# Patient Record
Sex: FEMALE | Race: WHITE | Employment: OTHER | URBAN - METROPOLITAN AREA
[De-identification: names, ages, dates, MRNs, and addresses within clinical notes are randomized per-mention and may not be internally consistent; named-entity substitution may affect disease eponyms.]

---

## 2017-07-24 ENCOUNTER — INITIAL CONSULT (OUTPATIENT)
Dept: PHYSICAL MEDICINE AND REHAB | Age: 67
End: 2017-07-24
Payer: COMMERCIAL

## 2017-07-24 VITALS
HEART RATE: 93 BPM | SYSTOLIC BLOOD PRESSURE: 109 MMHG | HEIGHT: 60 IN | BODY MASS INDEX: 21.79 KG/M2 | TEMPERATURE: 97.6 F | WEIGHT: 111 LBS | DIASTOLIC BLOOD PRESSURE: 67 MMHG

## 2017-07-24 DIAGNOSIS — I63.9 CEREBROVASCULAR ACCIDENT (CVA), UNSPECIFIED MECHANISM (HCC): ICD-10-CM

## 2017-07-24 DIAGNOSIS — R26.89 FUNCTIONAL GAIT ABNORMALITY: Primary | ICD-10-CM

## 2017-07-24 PROCEDURE — 99203 OFFICE O/P NEW LOW 30 MIN: CPT | Performed by: PHYSICAL MEDICINE & REHABILITATION

## 2017-07-24 RX ORDER — WHEELCHAIR
EACH MISCELLANEOUS
Qty: 1 EACH | Refills: 0 | Status: SHIPPED | OUTPATIENT
Start: 2017-07-24

## 2018-07-10 ENCOUNTER — HOSPITAL ENCOUNTER (OUTPATIENT)
Age: 68
Setting detail: SPECIMEN
Discharge: HOME | End: 2018-07-10
Payer: COMMERCIAL

## 2018-07-10 LAB
ABSOLUTE EOS #: 0.21 K/UL (ref 0–0.44)
ABSOLUTE IMMATURE GRANULOCYTE: <0.03 K/UL (ref 0–0.3)
ABSOLUTE LYMPH #: 1.82 K/UL (ref 1.1–3.7)
ABSOLUTE MONO #: 0.5 K/UL (ref 0.1–1.2)
ANION GAP SERPL CALCULATED.3IONS-SCNC: 13 MMOL/L (ref 9–17)
BASOPHILS # BLD: 1 % (ref 0–2)
BASOPHILS ABSOLUTE: 0.04 K/UL (ref 0–0.2)
BUN BLDV-MCNC: 9 MG/DL (ref 8–23)
BUN/CREAT BLD: NORMAL (ref 9–20)
CALCIUM SERPL-MCNC: 8.8 MG/DL (ref 8.6–10.4)
CHLORIDE BLD-SCNC: 101 MMOL/L (ref 98–107)
CO2: 25 MMOL/L (ref 20–31)
CREAT SERPL-MCNC: 0.54 MG/DL (ref 0.5–0.9)
DIFFERENTIAL TYPE: NORMAL
EOSINOPHILS RELATIVE PERCENT: 3 % (ref 1–4)
GFR AFRICAN AMERICAN: >60 ML/MIN
GFR NON-AFRICAN AMERICAN: >60 ML/MIN
GFR SERPL CREATININE-BSD FRML MDRD: NORMAL ML/MIN/{1.73_M2}
GFR SERPL CREATININE-BSD FRML MDRD: NORMAL ML/MIN/{1.73_M2}
GLUCOSE BLD-MCNC: 82 MG/DL (ref 70–99)
HCT VFR BLD CALC: 40.3 % (ref 36.3–47.1)
HEMOGLOBIN: 13 G/DL (ref 11.9–15.1)
IMMATURE GRANULOCYTES: 0 %
LYMPHOCYTES # BLD: 27 % (ref 24–43)
MCH RBC QN AUTO: 30.1 PG (ref 25.2–33.5)
MCHC RBC AUTO-ENTMCNC: 32.3 G/DL (ref 28.4–34.8)
MCV RBC AUTO: 93.3 FL (ref 82.6–102.9)
MONOCYTES # BLD: 7 % (ref 3–12)
NRBC AUTOMATED: 0 PER 100 WBC
PDW BLD-RTO: 13 % (ref 11.8–14.4)
PLATELET # BLD: 226 K/UL (ref 138–453)
PLATELET ESTIMATE: NORMAL
PMV BLD AUTO: 11.8 FL (ref 8.1–13.5)
POTASSIUM SERPL-SCNC: 3.8 MMOL/L (ref 3.7–5.3)
RBC # BLD: 4.32 M/UL (ref 3.95–5.11)
RBC # BLD: NORMAL 10*6/UL
SEG NEUTROPHILS: 62 % (ref 36–65)
SEGMENTED NEUTROPHILS ABSOLUTE COUNT: 4.24 K/UL (ref 1.5–8.1)
SODIUM BLD-SCNC: 139 MMOL/L (ref 135–144)
WBC # BLD: 6.8 K/UL (ref 3.5–11.3)
WBC # BLD: NORMAL 10*3/UL

## 2019-01-01 ENCOUNTER — HOSPITAL ENCOUNTER (OUTPATIENT)
Age: 69
Setting detail: SPECIMEN
Discharge: HOME | End: 2019-12-19
Payer: COMMERCIAL

## 2019-01-01 LAB — TSH SERPL DL<=0.05 MIU/L-ACNC: 5.51 MIU/L (ref 0.3–5)

## 2019-01-01 PROCEDURE — 84443 ASSAY THYROID STIM HORMONE: CPT

## 2019-01-01 PROCEDURE — P9603 ONE-WAY ALLOW PRORATED MILES: HCPCS

## 2019-01-01 PROCEDURE — 36415 COLL VENOUS BLD VENIPUNCTURE: CPT

## 2019-03-27 ENCOUNTER — HOSPITAL ENCOUNTER (INPATIENT)
Age: 69
LOS: 1 days | Discharge: HOME | DRG: 556 | End: 2019-03-30
Attending: EMERGENCY MEDICINE | Admitting: EMERGENCY MEDICINE
Payer: COMMERCIAL

## 2019-03-27 ENCOUNTER — APPOINTMENT (OUTPATIENT)
Dept: GENERAL RADIOLOGY | Age: 69
DRG: 556 | End: 2019-03-27
Payer: COMMERCIAL

## 2019-03-27 ENCOUNTER — APPOINTMENT (OUTPATIENT)
Dept: CT IMAGING | Age: 69
DRG: 556 | End: 2019-03-27
Payer: COMMERCIAL

## 2019-03-27 DIAGNOSIS — M25.562 ACUTE PAIN OF LEFT KNEE: Primary | ICD-10-CM

## 2019-03-27 PROCEDURE — 99285 EMERGENCY DEPT VISIT HI MDM: CPT

## 2019-03-27 PROCEDURE — 6360000002 HC RX W HCPCS: Performed by: EMERGENCY MEDICINE

## 2019-03-27 PROCEDURE — 73590 X-RAY EXAM OF LOWER LEG: CPT

## 2019-03-27 PROCEDURE — G0378 HOSPITAL OBSERVATION PER HR: HCPCS

## 2019-03-27 PROCEDURE — 73552 X-RAY EXAM OF FEMUR 2/>: CPT

## 2019-03-27 PROCEDURE — 73560 X-RAY EXAM OF KNEE 1 OR 2: CPT

## 2019-03-27 PROCEDURE — 96372 THER/PROPH/DIAG INJ SC/IM: CPT

## 2019-03-27 PROCEDURE — 6360000002 HC RX W HCPCS

## 2019-03-27 RX ORDER — MORPHINE SULFATE 4 MG/ML
4 INJECTION, SOLUTION INTRAMUSCULAR; INTRAVENOUS ONCE
Status: COMPLETED | OUTPATIENT
Start: 2019-03-27 | End: 2019-03-27

## 2019-03-27 RX ORDER — KETOROLAC TROMETHAMINE 15 MG/ML
15 INJECTION, SOLUTION INTRAMUSCULAR; INTRAVENOUS ONCE
Status: COMPLETED | OUTPATIENT
Start: 2019-03-27 | End: 2019-03-27

## 2019-03-27 RX ORDER — TIZANIDINE 2 MG/1
2 TABLET ORAL EVERY 8 HOURS PRN
Status: ON HOLD | COMMUNITY
End: 2019-05-31 | Stop reason: HOSPADM

## 2019-03-27 RX ORDER — MORPHINE SULFATE 4 MG/ML
INJECTION, SOLUTION INTRAMUSCULAR; INTRAVENOUS
Status: COMPLETED
Start: 2019-03-27 | End: 2019-03-27

## 2019-03-27 RX ADMIN — KETOROLAC TROMETHAMINE 15 MG: 15 INJECTION, SOLUTION INTRAMUSCULAR; INTRAVENOUS at 19:00

## 2019-03-27 RX ADMIN — MORPHINE SULFATE 4 MG: 4 INJECTION INTRAVENOUS at 20:17

## 2019-03-27 RX ADMIN — MORPHINE SULFATE 4 MG: 4 INJECTION INTRAVENOUS at 19:13

## 2019-03-27 RX ADMIN — MORPHINE SULFATE 4 MG: 4 INJECTION, SOLUTION INTRAMUSCULAR; INTRAVENOUS at 19:13

## 2019-03-27 ASSESSMENT — PAIN SCALES - GENERAL
PAINLEVEL_OUTOF10: 5
PAINLEVEL_OUTOF10: 9
PAINLEVEL_OUTOF10: 10

## 2019-03-27 ASSESSMENT — PAIN DESCRIPTION - LOCATION
LOCATION: KNEE
LOCATION: KNEE

## 2019-03-27 ASSESSMENT — PAIN DESCRIPTION - PAIN TYPE
TYPE: ACUTE PAIN
TYPE: ACUTE PAIN

## 2019-03-27 ASSESSMENT — PAIN DESCRIPTION - ORIENTATION
ORIENTATION: LEFT
ORIENTATION: LEFT

## 2019-03-27 ASSESSMENT — PAIN DESCRIPTION - DESCRIPTORS: DESCRIPTORS: SHARP

## 2019-03-27 ASSESSMENT — PAIN DESCRIPTION - FREQUENCY: FREQUENCY: CONTINUOUS

## 2019-03-27 NOTE — ED NOTES
Bed: 14  Expected date:   Expected time:   Means of arrival:   Comments:  [facility], RN  03/27/19 0341
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
## 2019-03-27 NOTE — ED NOTES
Pt to ed via ems. Pt has hx of CVA in 2012 with chronic left sided paralysis. Pt is in wheelchair at home, was pivoting from wheelchair to bathroom and felt sharp pain through left knee. Pt did not fall. Pt PMS intact. Pt concerned she \"twisted her knee\" left foot is notably swollen, pt reports this is chronic due to how she lays in bed and wheel chair. Pt is aox3.       Dorene Estrada RN  03/27/19 4860

## 2019-03-27 NOTE — CONSULTS
Orthopedic Surgery Consult  (Dr. Fabiana Willson)                   CC/Reason for consult:      HPI:    The patient is a 77 y.o. 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 's with knee pain after pivoting during a transfer. She experienced a sharp pain in her left knee at that time. Patient is non ambulatory at baseline. She has a flexion contracture of her left knee at baseline. Denies numbness/ tingling. She does not recall any injuries prior to this event to the left knee. Past Medical History:    Past Medical History:   Diagnosis Date    Abnormal computed tomography of cervical spine     sclerotic bone appearance     CVA (cerebral vascular accident) ([address]     left  side weakness    Paraproteinemia     Weight loss        Past Surgical History:    History reviewed. No pertinent surgical history. Medications Prior to Admission:   Prior to Admission medications    Medication Sig Start Date End Date Taking? Authorizing Provider   tiZANidine (ZANAFLEX) 2 MG tablet Take 2 mg by mouth every morning   Yes Historical Provider, MD   Misc. Devices [facility]) [address] [facility] wheelchair with left fupper extremity support  Dx: stroke with left hemiparesis. 7/24/17  Yes Johanna Walker MD   aspirin 81 MG tablet Take 81 mg by mouth daily   Yes Historical Provider, MD   clopidogrel (PLAVIX) 75 MG tablet TAKE 1 TABLET DAILY 3/31/15  Yes Willis Cockayne, DO   DOCQLACE 100 MG capsule TAKE 1 CAPSULE BY MOUTH IN THE MORNING & IN THE EVENING -15-[address] TAKING THIS MEDICINE 3/31/15  Yes Willis Cockayne, DO   amLODIPine (NORVASC) 10 MG tablet Take 10 mg by mouth daily. Yes Historical Provider, MD   LORazepam (ATIVAN) 0.5 MG tablet Take 0.5 mg by mouth every 6 hours as needed. Yes Historical Provider, MD   therapeutic multivitamin-minerals (THERAGRAN-M) tablet Take 1 tablet by mouth daily. Yes Historical Provider, MD   omeprazole (PRILOSEC) 20 MG capsule Take 20 mg by mouth daily. Yes Historical Provider, MD   metaxalone (SKELAXIN) 800 MG tablet Take 800 mg by mouth 3 times daily. Yes Historical Provider, MD   tiotropium (SPIRIVA) 18 MCG inhalation capsule Inhale 18 mcg into the lungs daily. Yes Historical Provider, MD   SACROSIDASE PO Take  by mouth. Yes Historical Provider, MD   venlafaxine (EFFEXOR) 37.5 MG tablet Take 37.5 mg by mouth 3 times daily. Yes Historical Provider, MD   zolpidem (AMBIEN) 5 MG tablet Take 5 mg by mouth nightly as needed. Yes Historical Provider, MD       Allergies:    Penicillins and Amoxicillin    Social History:   Social History     Socioeconomic History    Marital status:      Spouse name: None    Number of children: None    Years of education: None    Highest education level: None   Occupational History    None   Social Needs    Financial resource strain: None    Food insecurity:     Worry: None     Inability: None    Transportation needs:     Medical: None     Non-medical: None   Tobacco Use    Smoking status: Current Every Day Smoker     Packs/day: 1.00     Years: 30.00     Pack years: 30.00    Smokeless tobacco: Never Used   Substance and Sexual Activity    Alcohol use: Yes     Alcohol/week: 16.8 oz     Types: 28 Glasses of wine per week    Drug use: No    Sexual activity: None   Lifestyle    Physical activity:     Days per week: None     Minutes per session: None    Stress: None   Relationships    Social connections:     Talks on phone: None     Gets together: None     Attends Orthodox service: None     Active member of club or organization: None     Attends meetings of clubs or organizations: None     Relationship status: None    Intimate partner violence:     Fear of current or ex partner: None     Emotionally abused: None     Physically abused: None     Forced sexual activity: None   Other Topics Concern    None   Social History Narrative    None       Family History:  History reviewed.  No pertinent family history. REVIEW OF SYSTEMS:    Constitutional: Negative for fever and chills. HENT: Negative for congestion. Eyes: Negative for blurred vision and double vision. Respiratory: Negative for cough, shortness of breath and wheezing. Cardiovascular: Negative for chest pain and palpitations. Gastrointestinal: Negative for nausea. Negative for vomiting. Musculoskeletal: Positive for L knee pain. See HPI   Skin: Negative for itching and rash. Neurological: Negative for dizziness, sensory change and headaches. Psychiatric/Behavioral: Negative for depression and suicidal ideas. PHYSICAL EXAM:  /71   Pulse 108   Temp 98 °F (36.7 °C) (Oral)   Resp 18   Ht 5' (1.524 m)   Wt 111 lb (50.3 kg)   SpO2 96%   BMI 21.68 kg/m²     Gen: AAOx3, NAD, cooperative     Head: normocephalic, atraumatic     Cardiovascular: Regular rate, no dependent edema, distal pulses 2+     Respiratory: Chest symmetric, no accessory muscle use, normal respirations. LLE: Significant muscle wasting to left lower extremity. Flexion contracture present, unable to range knee due to pain and stiffness. Mild effusion noted to knee. Compartments soft and compressible. EHL/FHL/TA/GSC gross motor intact. Sural, saphenous, superificial/deep peroneal, and plantar nerve distribution SILT. Foot and toes warm and well-perfused w/ BCR; DP pulses 2+. LABS:  No results for input(s): WBC, HGB, HCT, PLT, INR, PTT, NA, K, BUN, CREATININE, GLUCOSE, SEDRATE, CRP in the last 72 hours. Invalid input(s): PT     Radiology:    L knee films: demonstrate significant osteopenia throughout the leg, flexion contracture and projection of the femur over the tibia makes bony analysis difficult, though no obvious fracture or abnormalities noted.  Lateral images of knee demonstrate flexion contracture without other osseous abnormalities     A/P: 77 y.o. female being seen for L knee pain after twisting injury during a pivot    -No obvious osseous abnormality seen on XR, though difficult to assess due to flexion contracture.  Will obtain CT to better assess bony anatomy.   -Weight bearing: NWB LLE  -Obs to admit for PT and placement  -Pain control per primary team  -Ice and elevation for pain/swelling  -Please page Ortho with any questions or concerns    Charlotte Butler DO,   PGY-2 Orthopedic Surgery  7:33 PM 3/27/2019

## 2019-03-27 NOTE — ED PROVIDER NOTES
[facility]     Emergency Department     Faculty Attestation    I performed a history and physical examination of the patient and discussed management with the resident. I reviewed the residents note and agree with the documented findings and plan of care. Any areas of disagreement are noted on the chart. I was personally present for the key portions of any procedures. I have documented in the chart those procedures where I was not present during the key portions. I have reviewed the emergency nurses triage note. I agree with the chief complaint, past medical history, past surgical history, allergies, medications, social and family history as documented unless otherwise noted below. For Physician Assistant/ Nurse Practitioner cases/documentation I have personally evaluated this patient and have completed at least one if not all key elements of the E/M (history, physical exam, and MDM). Additional findings are as noted. Pain left distal femur, patient holding the left knee completely flexed, unwilling to straighten it out.       Shalonda Diane MD  03/27/19 0603

## 2019-03-27 NOTE — ED NOTES
Xray at bedside with patient, pt screaming in pain, Dr. Adrianna Michel notified, see mar for medication administration.       Dorene Estrada, VICKY  03/27/19 1914

## 2019-03-28 ENCOUNTER — APPOINTMENT (OUTPATIENT)
Dept: CT IMAGING | Age: 69
DRG: 556 | End: 2019-03-28
Payer: COMMERCIAL

## 2019-03-28 LAB
C-REACTIVE PROTEIN: 18.7 MG/L (ref 0–5)
SEDIMENTATION RATE, ERYTHROCYTE: 16 MM (ref 0–20)

## 2019-03-28 PROCEDURE — 96360 HYDRATION IV INFUSION INIT: CPT

## 2019-03-28 PROCEDURE — 85651 RBC SED RATE NONAUTOMATED: CPT

## 2019-03-28 PROCEDURE — G0378 HOSPITAL OBSERVATION PER HR: HCPCS

## 2019-03-28 PROCEDURE — 97530 THERAPEUTIC ACTIVITIES: CPT

## 2019-03-28 PROCEDURE — 6370000000 HC RX 637 (ALT 250 FOR IP): Performed by: EMERGENCY MEDICINE

## 2019-03-28 PROCEDURE — 97535 SELF CARE MNGMENT TRAINING: CPT

## 2019-03-28 PROCEDURE — 2580000003 HC RX 258: Performed by: EMERGENCY MEDICINE

## 2019-03-28 PROCEDURE — 86140 C-REACTIVE PROTEIN: CPT

## 2019-03-28 PROCEDURE — 2580000003 HC RX 258: Performed by: STUDENT IN AN ORGANIZED HEALTH CARE EDUCATION/TRAINING PROGRAM

## 2019-03-28 PROCEDURE — 96361 HYDRATE IV INFUSION ADD-ON: CPT

## 2019-03-28 PROCEDURE — 97166 OT EVAL MOD COMPLEX 45 MIN: CPT

## 2019-03-28 PROCEDURE — 97162 PT EVAL MOD COMPLEX 30 MIN: CPT

## 2019-03-28 PROCEDURE — 36415 COLL VENOUS BLD VENIPUNCTURE: CPT

## 2019-03-28 PROCEDURE — 94640 AIRWAY INHALATION TREATMENT: CPT

## 2019-03-28 RX ORDER — SODIUM CHLORIDE 0.9 % (FLUSH) 0.9 %
10 SYRINGE (ML) INJECTION PRN
Status: DISCONTINUED | OUTPATIENT
Start: 2019-03-28 | End: 2019-03-30 | Stop reason: HOSPADM

## 2019-03-28 RX ORDER — AMLODIPINE BESYLATE 10 MG/1
10 TABLET ORAL DAILY
Status: DISCONTINUED | OUTPATIENT
Start: 2019-03-28 | End: 2019-03-30 | Stop reason: HOSPADM

## 2019-03-28 RX ORDER — DOCUSATE SODIUM 100 MG/1
100 CAPSULE, LIQUID FILLED ORAL DAILY
Status: DISCONTINUED | OUTPATIENT
Start: 2019-03-28 | End: 2019-03-30 | Stop reason: HOSPADM

## 2019-03-28 RX ORDER — TRAZODONE HYDROCHLORIDE 50 MG/1
50 TABLET ORAL NIGHTLY
Status: ON HOLD | COMMUNITY
End: 2019-05-31 | Stop reason: HOSPADM

## 2019-03-28 RX ORDER — SODIUM CHLORIDE 0.9 % (FLUSH) 0.9 %
10 SYRINGE (ML) INJECTION EVERY 12 HOURS SCHEDULED
Status: DISCONTINUED | OUTPATIENT
Start: 2019-03-28 | End: 2019-03-30 | Stop reason: HOSPADM

## 2019-03-28 RX ORDER — PANTOPRAZOLE SODIUM 40 MG/1
40 TABLET, DELAYED RELEASE ORAL
Status: DISCONTINUED | OUTPATIENT
Start: 2019-03-28 | End: 2019-03-30 | Stop reason: HOSPADM

## 2019-03-28 RX ORDER — VENLAFAXINE 37.5 MG/1
37.5 TABLET ORAL 3 TIMES DAILY
Status: DISCONTINUED | OUTPATIENT
Start: 2019-03-28 | End: 2019-03-30 | Stop reason: HOSPADM

## 2019-03-28 RX ORDER — SUCRALFATE ORAL 1 G/10ML
1 SUSPENSION ORAL 4 TIMES DAILY
Status: ON HOLD | COMMUNITY
End: 2019-05-31 | Stop reason: HOSPADM

## 2019-03-28 RX ORDER — SODIUM CHLORIDE 9 MG/ML
INJECTION, SOLUTION INTRAVENOUS CONTINUOUS
Status: DISCONTINUED | OUTPATIENT
Start: 2019-03-28 | End: 2019-03-30 | Stop reason: HOSPADM

## 2019-03-28 RX ORDER — ASPIRIN 81 MG/1
81 TABLET ORAL DAILY
Status: DISCONTINUED | OUTPATIENT
Start: 2019-03-28 | End: 2019-03-30 | Stop reason: HOSPADM

## 2019-03-28 RX ORDER — M-VIT,TX,IRON,MINS/CALC/FOLIC 27MG-0.4MG
1 TABLET ORAL DAILY
Status: DISCONTINUED | OUTPATIENT
Start: 2019-03-28 | End: 2019-03-30 | Stop reason: HOSPADM

## 2019-03-28 RX ORDER — METAXALONE 800 MG/1
800 TABLET ORAL 3 TIMES DAILY
Status: DISCONTINUED | OUTPATIENT
Start: 2019-03-28 | End: 2019-03-28

## 2019-03-28 RX ORDER — TIZANIDINE 2 MG/1
2 TABLET ORAL EVERY MORNING
Status: DISCONTINUED | OUTPATIENT
Start: 2019-03-28 | End: 2019-03-30 | Stop reason: HOSPADM

## 2019-03-28 RX ORDER — CLOPIDOGREL BISULFATE 75 MG/1
75 TABLET ORAL DAILY
Status: DISCONTINUED | OUTPATIENT
Start: 2019-03-28 | End: 2019-03-30 | Stop reason: HOSPADM

## 2019-03-28 RX ORDER — IBUPROFEN 800 MG/1
800 TABLET ORAL EVERY 6 HOURS PRN
Status: DISCONTINUED | OUTPATIENT
Start: 2019-03-28 | End: 2019-03-30 | Stop reason: HOSPADM

## 2019-03-28 RX ORDER — LORAZEPAM 0.5 MG/1
0.5 TABLET ORAL EVERY 6 HOURS PRN
Status: DISCONTINUED | OUTPATIENT
Start: 2019-03-28 | End: 2019-03-30 | Stop reason: HOSPADM

## 2019-03-28 RX ORDER — ZOLPIDEM TARTRATE 5 MG/1
5 TABLET ORAL NIGHTLY PRN
Status: DISCONTINUED | OUTPATIENT
Start: 2019-03-28 | End: 2019-03-30 | Stop reason: HOSPADM

## 2019-03-28 RX ADMIN — Medication 10 ML: at 22:11

## 2019-03-28 RX ADMIN — VENLAFAXINE 37.5 MG: 37.5 TABLET ORAL at 21:14

## 2019-03-28 RX ADMIN — SODIUM CHLORIDE: 9 INJECTION, SOLUTION INTRAVENOUS at 22:11

## 2019-03-28 RX ADMIN — IBUPROFEN 800 MG: 800 TABLET, FILM COATED ORAL at 19:58

## 2019-03-28 RX ADMIN — IBUPROFEN 800 MG: 800 TABLET, FILM COATED ORAL at 09:46

## 2019-03-28 RX ADMIN — PANTOPRAZOLE SODIUM 40 MG: 40 TABLET, DELAYED RELEASE ORAL at 09:46

## 2019-03-28 RX ADMIN — TIOTROPIUM BROMIDE 18 MCG: 18 CAPSULE ORAL; RESPIRATORY (INHALATION) at 10:13

## 2019-03-28 RX ADMIN — LORAZEPAM 0.5 MG: 0.5 TABLET ORAL at 12:46

## 2019-03-28 ASSESSMENT — PAIN DESCRIPTION - PAIN TYPE
TYPE: ACUTE PAIN;CHRONIC PAIN
TYPE: ACUTE PAIN
TYPE: ACUTE PAIN
TYPE: ACUTE PAIN;CHRONIC PAIN

## 2019-03-28 ASSESSMENT — PAIN DESCRIPTION - FREQUENCY
FREQUENCY: CONTINUOUS
FREQUENCY: CONTINUOUS

## 2019-03-28 ASSESSMENT — PAIN DESCRIPTION - PROGRESSION

## 2019-03-28 ASSESSMENT — PAIN SCALES - GENERAL
PAINLEVEL_OUTOF10: 9

## 2019-03-28 ASSESSMENT — ENCOUNTER SYMPTOMS
BLOOD IN STOOL: 0
SHORTNESS OF BREATH: 0
NAUSEA: 0
ABDOMINAL PAIN: 0
CHEST TIGHTNESS: 0
SORE THROAT: 0
CONSTIPATION: 0
BACK PAIN: 0
DIARRHEA: 0
VOMITING: 0
RHINORRHEA: 0

## 2019-03-28 ASSESSMENT — PAIN DESCRIPTION - DESCRIPTORS
DESCRIPTORS: ACHING;BURNING;CONSTANT;DISCOMFORT
DESCRIPTORS: ACHING;SORE

## 2019-03-28 ASSESSMENT — PAIN DESCRIPTION - ORIENTATION
ORIENTATION: LEFT

## 2019-03-28 ASSESSMENT — PAIN DESCRIPTION - LOCATION
LOCATION: KNEE

## 2019-03-28 ASSESSMENT — PAIN DESCRIPTION - ONSET
ONSET: ON-GOING
ONSET: ON-GOING

## 2019-03-28 NOTE — ED PROVIDER NOTES
[facility]  Emergency Department  Emergency Medicine Resident Sign-out     Care of Suad Valentino was assumed from Dr. Estrella Davis and is being seen for Knee Pain (left knee pain, attempting to transfer from wheelchair to bathroom, felt like left knee is dislocated, previous CVA, left sided paralsis )  . The patient's initial evaluation and plan have been discussed with the prior provider who initially evaluated the patient. EMERGENCY DEPARTMENT COURSE / MEDICAL DECISION MAKING:       MEDICATIONS GIVEN:  Orders Placed This Encounter   Medications    ketorolac (TORADOL) injection 15 mg    morphine 4 MG/ML injection     KATHERIN SANCHEZ: cabinet override    morphine injection 4 mg    morphine injection 4 mg       LABS / RADIOLOGY:     Labs Reviewed - No data to display    Xr Femur Left (min 2 Views)    Result Date: 3/27/2019  EXAMINATION: 4 XRAY VIEWS OF THE LEFT FEMUR; 2 XRAY VIEWS OF THE LEFT KNEE; 3 XRAY VIEWS OF THE LEFT TIBIA AND FIBULA 3/27/2019 7:00 pm COMPARISON: Left hip 11/14/2015. HISTORY: ORDERING SYSTEM PROVIDED HISTORY: pain TECHNOLOGIST PROVIDED HISTORY: pain Ordering Physician Provided Reason for Exam: pt twisted wrong, lt knee pain, unable to straighten knee. Acuity: Acute Type of Exam: Initial FINDINGS: Left femur, four views: The bones are diffusely osteopenic. No acute fracture deformity. The hip joint is maintained. The femoral head projects within the acetabulum. No focal soft tissue abnormality is seen. Left knee, two views: The knee is flexed. No acute osseous abnormality. No joint effusion. Joint spaces are not optimally profiled but no significant arthritic changes are apparent. Left tib-fib, three views: There is evidence of a remote healed distal tibia fracture. No acute fracture or dislocation is seen. No focal soft tissue abnormality. No acute osseous abnormality of the left femur. No acute osseous abnormality of the left knee.  No acute osseous

## 2019-03-28 NOTE — PROGRESS NOTES
CDU Daily Progress Note  Attending Physician       Pt Name: Fercho Esquivel  MRN: [id number]  Cathigfdeepak 1950  Date of evaluation: 3/28/19    I performed a history and physical examination of the patient and discussed management with the resident. I reviewed the residents note and agree with the documented findings and plan of care. Any areas of disagreement are noted on the chart. I was personally present for the key portions of any procedures. I have documented in the chart those procedures where I was not present during the key portions. I have reviewed the emergency nurses triage note. I agree with the chief complaint, past medical history, past surgical history, allergies, medications, social and family history as documented unless otherwise noted below. Documentation of the HPI, Physical Exam and Medical Decision Making performed by medical students or scribes is based on my personal performance of the HPI, PE and MDM. For Physician Assistant/ Nurse Practitioner cases/documentation I have personally evaluated this patient and have completed at least one if not all key elements of the E/M (history, physical exam, and MDM). Additional findings are as noted. The Family History, Social History and Review of Systems are unchanged from the previous day. No significant events overnight. Felt sudden severe pain in L knee when she pivoted transferring from toilet to wheelchair. Denies any previous trauma or injury. She is not ambulatory and has knee flexion due to contracture at baseline. XRays of LLE show old fracture to tibia, and osteoporosis, no new fracture. Ortho consulted will get CT L knee. Patient refused CT of knee and refused any movement of her L knee. Ortho following. OTPT to evaluate mobility, ability to stand w walker. Lord Karl Alan MD  Attending Physician  Critical Decision Unit

## 2019-03-28 NOTE — PROGRESS NOTES
Progress Note    Patient:  William Patterson  YOB: 1950     77 y.o. female    Subjective:  Patient seen and examined at bedside this morning. Continued left knee pain, unchanged from last night. Notes pain throughout entire left leg. No additional acute issues overnight per nursing. Pain well-controlled on current regimen  Denies: fever/chills, HA, CP, SOB, N/V, dysuria, or numbness/tingling in extremities. PT: to evaluate and treat. Objective:   Vitals:    03/28/19 0518   BP: (!) 147/67   Pulse: 112   Resp:    Temp: 98 °F (36.7 °C)   SpO2: 97%     Gen: NAD, cooperative  LLE: Significant TTP through left leg concentrated globally around knee. Hyperesthesias noted. Mild effusion noted. Unable to tolerate exam. Refuses ROM. Sensation in tact distally and moving toes slightly, though motor limited at baseline due to stroke. No results for input(s): WBC, HGB, HCT, PLT, INR, PTT, NA, K, BUN, CREATININE, GLUCOSE in the last 72 hours. Invalid input(s): PT,  ESR,  CRP    Meds: ASA, Plavix  See rec for complete list    Assessment/Plan : 77 y.o. female s/p twisting injury to L knee, now with pain and swelling.    -Unable to obtain CT/MRI due to patient tolerance of positioning.  -Patient offered aspiration of L knee to detect if a hemarthrosis is present in light of potential injury. Patient refusing needle aspiration.  -No concern for bony injury, though I am concerned patient may have a ligamentous injury, unfortunately we are unable to obtain imaging at this time to assess due to poor patient tolerance.   -Will order CRP/ESR to rule out infection as potential source of knee pain.  Though low clinical concern at this time for septic arthritis.  -Weight Bearing: activity as tolerated  -Pain control: per primary team  -Tolerating PO intake well  -Ice (20 min, 1 hour off) and elevation for edema/pain control  -Encourage IS and deep breathing  -DVT ppx: Ok for [employer] from orthopedic standpoint EPC  -Recommend follow up with Dr. Ferreira Every next week for evaluation of knee to assess for ligamentous injury.   -Please page ortho with any questions      --------------------------------------------------------------  Neha Alfaro DO, PGY-2  [facility]) Orthopedic Surgery   9:34 AM 03/28/19
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
## 2019-03-28 NOTE — PROGRESS NOTES
Physical Therapy    Facility/Department: [address] BURN UNIT  Initial Assessment    NAME: Libby Issa  : 1950  MRN: [id number]  Chief Complaint   Patient presents with    Knee Pain     left knee pain, attempting to transfer from wheelchair to bathroom, felt like left knee is dislocated, previous CVA, left sided paralsis      Date of Service: 3/28/2019    Discharge Recommendations:    Further therapy recommended at discharge. Assessment   Body structures, Functions, Activity limitations: Decreased functional mobility ; Decreased ROM; Decreased strength;Decreased safe awareness;Decreased endurance;Decreased ADL status; Decreased balance  Assessment: Pt demonstrates inconsistent mobility and complaints of pain throughout evaluation. Pt complains of L knee pain with active or passive movement of any extremity. Pt resists assistance for mobility and screams out in pain, therefore unable to perform full supine to sitting EOB and unable to transfer to wheelchair at this time. Pt is unsafe to return to prior living arrangements without 24hr assistance. Prognosis: Fair  Decision Making: Low Complexity  Patient Education: Extensive time spent educating pt on importance of mobility, positioning, pressure relief and side effects of immobility. REQUIRES PT FOLLOW UP: Yes  Activity Tolerance  Activity Tolerance: Patient limited by pain       Patient Diagnosis(es): The encounter diagnosis was Acute pain of left knee. has a past medical history of Abnormal computed tomography of cervical spine, CVA (cerebral vascular accident) ([address] Paraproteinemia, and Weight loss. has no past surgical history on file.     Restrictions  Restrictions/Precautions  Restrictions/Precautions: Fall Risk  Required Braces or Orthoses?: No  Position Activity Restriction  Other position/activity restrictions: up with assist, L hemiplegia from prior CVA, L knee x-ray negative  Vision/Hearing  Vision: Within Functional Limits  Hearing: Within functional limits     Subjective  General  Patient assessed for rehabilitation services?: Yes  Response To Previous Treatment: Not applicable  Family / Caregiver Present: No  Follows Commands: Within Functional Limits  Subjective  Subjective: RN and pt agreeable to PT. Pt supine in bed upon arrival, complains of severe L knee pain with attempted ROM of any extremity. Pain Screening  Patient Currently in Pain: Yes  Pain Assessment  Pain Assessment: 0-10  Pain Level: 9  Pain Type: Acute pain  Pain Location: Knee  Pain Orientation: Left  Pain Descriptors: Aching; Sore  Pain Frequency: Continuous  Pain Onset: On-going  Clinical Progression: Not changed  Non-Pharmaceutical Pain Intervention(s): Repositioned  Response to Pain Intervention: Patient Satisfied  Vital Signs  Patient Currently in Pain: Yes       Orientation  Orientation  Overall Orientation Status: Within Functional Limits  Social/Functional History  Social/Functional History  Lives With: Alone  Type of Home: Apartment  Home Layout: One level  Home Access: Level entry  Bathroom Shower/Tub: Walk-in shower  Bathroom Toilet: Standard  Bathroom Equipment: Grab bars in shower, Shower chair, Grab bars around toilet  Bathroom Accessibility: Wheelchair accessible  Home Equipment: Hospital bed, [employer], Wheelchair-electric(pt reported primarily using manual)  Receives Help From: Home health(HHA 8-10, 2-4 everyday)  ADL Assistance: Needs assistance(pt reported HHA assists with ADLs)  Homemaking Assistance: Needs assistance  Homemaking Responsibilities: No(pt reported HHA complete )  Ambulation Assistance: Needs assistance(pt w/c bound )  Transfer Assistance: Independent(pt reported doing stand-pivot to transfer and sometimes using slide board)  Active : No  Leisure & Hobbies: karli   Objective          Joint Mobility  Spine: WFL  ROM RLE: WFL  ROM LLE: Maintained hip and knee flexion- pt would not allow therapist to attempt ROM  ROM RUE: WFL  ROM LUE: JOSHUA- pt would not allow assessment of elbow/shoulder, flexion contracture of digits noted  Strength RLE  Comment: At least 3/5 based on observed mobility. Pt would not allow overpressure for MMT due to pain in L knee  Strength LLE  Comment: Pt reports no active movement, however would not allow assessment due to pain  Strength RUE  Comment: At least 3/5 based on observed mobility. Pt would not allow overpressure for MMT due to pain in L knee  Strength LUE  Comment: Grossly 0/5     Sensation  Overall Sensation Status: Impaired(Pt reports chronic LUE and LLE numbness from prior CVA)  Bed mobility  Rolling to Left: Maximum assistance  Rolling to Right: Maximum assistance  Supine to Sit: Maximum assistance;2 Person assistance  Comment: Pt would not allow writer to assist with bed mobility. Pt can initiate supine to sit independently, however returns to supine abruptly due to L knee pain. Pt previously moved RLE independently, with attempted assistance from writer, pt resisted into hip flexion and adduction shouting, \"I just can't do it, it hurts too bad. \"  Transfers  Sit to Stand: Unable to assess  Comment: Pt refused any further attempts at mobility despite max encouragement and education. Ambulation  Ambulation?: No     Balance  Posture: Fair  Comments: Upon exiting the room, pt able to perform unsupported sitting by leaning forward in bed to reach for various items on the meal tray.         Plan   Plan  Times per week: 5x  Current Treatment Recommendations: Strengthening, Balance Training, Functional Mobility Training, Transfer Training, Endurance Training, Patient/Caregiver Education & Training, Equipment Evaluation, Education, & procurement, Safety Education & Training, ROM  Safety Devices  Type of devices: Left in bed, Call light within reach, Nurse notified, Bed alarm in place  Restraints  Initially in place: No      AM-PAC Score  AM-PAC Inpatient Mobility Raw Score : 7  AM-PAC Inpatient T-Scale Score : 26.42  Mobility Inpatient CMS 0-100% Score: 92.36  Mobility Inpatient CMS G-Code Modifier : CM          Goals  Short term goals  Time Frame for Short term goals: 14 visits  Short term goal 1: Mod I for bed mobility  Short term goal 2: Demo Fair dynamic sitting balance to decrease risk of falls  Short term goal 3: Participate in 30 minutes of therapy to demo increased activity tolerance  Short term goal 4: Perform sit pivot or slideboard transfer to wheelchair with Mod A       Therapy Time   Individual Concurrent Group Co-treatment   Time In 1326         Time Out 1358         Minutes 32         Timed Code Treatment Minutes: 9 Minutes       Davonte Vázquez, PT

## 2019-03-28 NOTE — DISCHARGE INSTR - COC
Care  · Address:  · Phone:      [phone]  · Fax:         [phone]    Dialysis Facility (if applicable)   · Name:  · Address:  · Dialysis Schedule:  · Phone:  · Fax:    / signature: Electronically signed by Taz Bailey RN on 3/29/19 at 8:40 AM    PHYSICIAN SECTION    Prognosis: Good    Condition at Discharge: Stable    Rehab Potential (if transferring to Rehab): Good    Recommended Labs or Other Treatments After Discharge: n    Physician Certification: I certify the above information and transfer of Joss Wild  is necessary for the continuing treatment of the diagnosis listed and that she requires [place] for less 30 days.      Update Admission H&P: No change in H&P    PHYSICIAN SIGNATURE:  Electronically signed by Ella Fonseca MD on 3/29/19 at 12:57 PM

## 2019-03-28 NOTE — CARE COORDINATION
Case Management Initial Discharge Plan  [facility],             Met with:patient to discuss discharge plans. Information verified: address, contacts, phone number, , insurance Yes  PCP: Giovanny Pena DO  Date of last visit: ROSIO Hartmann does home visits monthly    Insurance Provider: Donna King    Discharge Planning    Living Arrangements:  Alone   Support Systems:  Friends/Neighbors, Home Care Staff    Home has 1 stories  0 stairs to climb to get into front door, stairs to climb to reach second floor  Location of bedroom/bathroom in home main    Patient able to perform ADL's:Assisted    Current Services (outpatient & in home) home care  DME equipment: wheelchair  DME provider:     Pharmacy: [address] [place] [address] Medications:  No  Does patient want to participate in local refill/ meds to beds program?  No    Potential Assistance Needed:  Transportation    Patient agreeable to home care: Yes  Freedom of choice provided:  yes    Prior SNF/Rehab Placement and Facility: no  Agreeable to SNF/Rehab: No  Arthur of choice provided: n/a   Evaluation: n/a    Expected Discharge date:  19  Patient expects to be discharged to:  home  Follow Up Appointment: Best Day/ Time:      Transportation provider: denisse  Transportation arrangements needed for discharge: Yes    Readmission Risk              Risk of Unplanned Readmission:        6             Does patient have a readmission risk score greater than 14?: No  If yes, follow-up appointment must be made within 7 days of discharge. Discharge Plan: Pt lives alone. She is current with [facility]. States she has an aid 5 days a week and a nurse that comes out monthly. Uses a wheelchair at home but states able to transfer independently. Plan home with [facility]. Will need ambulette.           Electronically signed by Lisandra Ramires RN on 3/28/19 at 9:37 AM

## 2019-03-28 NOTE — PLAN OF CARE
Problem: Respiratory  Intervention: Respiratory assessment  Note:   BRONCHOSPASM/BRONCHOCONSTRICTION     ? IMPROVE AERATION/BREATH SOUNDS  ? ADMINISTER BRONCHODILATOR THERAPY AS APPROPRIATE  ? ASSESS BREATH SOUNDS  ? IMPLEMENT AEROSOL/MDI PROTOCOL  ?    PATIENT EDUCATION AS NEEDED

## 2019-03-28 NOTE — FLOWSHEET NOTE
Patient continues to refuse to be turned. Earlier today questioned patient about continence, stated she knows when she goes. This afternoon writer and another nurse did change her brief and all linen, pt was soaked with urine. Lying on her left side all day refusing to be touched.

## 2019-03-28 NOTE — PROGRESS NOTES
Occupational Therapy   Occupational Therapy Initial Assessment  Date: 3/28/2019   Patient Name: Gatito Gutierrez  MRN: [id number]     : 1950    Date of Service: 3/28/2019    Discharge Recommendations:    Further therapy recommended at discharge. Assessment   Performance deficits / Impairments: Decreased functional mobility ; Decreased strength;Decreased endurance;Decreased ADL status; Decreased high-level IADLs  Treatment Diagnosis: L knee pain   Prognosis: Good  Decision Making: Medium Complexity  Patient Education: pt ed on POC, purpose of eval, importance of continued movement to avoid bed sores and stiffness, hand placement during bed mobility, benefits of therapy, safe eating tech. poor return   REQUIRES OT FOLLOW UP: Yes  Comments: Pt inconsistent with reported pain and mobility throughout session, often complaining of pain during movement of other extremities. Pt very resistive to any type of movement, even after extensive education of importance of continued movement. Pt would be unsafe to return to current home situation without 24hr assistance due to limitations in functional mobility and ability to complete ADLs. Activity Tolerance  Activity Tolerance: Patient limited by pain  Safety Devices  Safety Devices in place: Yes  Type of devices: Call light within reach; Left in bed;Bed alarm in place         Patient Diagnosis(es): The encounter diagnosis was Acute pain of left knee. has a past medical history of Abnormal computed tomography of cervical spine, CVA (cerebral vascular accident) ([address] Paraproteinemia, and Weight loss. has no past surgical history on file.     Treatment Diagnosis: L knee pain       Restrictions  Restrictions/Precautions  Restrictions/Precautions: Fall Risk  Required Braces or Orthoses?: No  Position Activity Restriction  Other position/activity restrictions: up with assist, L hemiplegia from prior CVA, L knee x-ray negative    Subjective   General  Chart Reviewed: No  Patient assessed for rehabilitation services?: Yes  Family / Caregiver Present: No  Diagnosis: L knee pain   Subjective  Subjective: co-eval with PT  General Comment  Comments: RN ok'd for therapy this afternoon. Pt agreeable to participate in session, but minimally engaged throughout   Pain Assessment  Pain Assessment: 0-10  Pain Level: 9  Pain Type: Acute pain  Pain Location: Knee  Pain Orientation: Left  Non-Pharmaceutical Pain Intervention(s): Therapeutic presence, Distraction, Emotional support  Response to Pain Intervention: Patient Satisfied    Oxygen Therapy  O2 Device: None (Room air)     Social/Functional History  Social/Functional History  Lives With: Alone  Type of Home: Apartment  Home Layout: One level  Home Access: Level entry  Bathroom Shower/Tub: Walk-in shower  Bathroom Toilet: Standard  Bathroom Equipment: Grab bars in shower, Shower chair, Grab bars around toilet  Bathroom Accessibility: Wheelchair accessible  Home Equipment: Hospital bed, [address], Wheelchair-electric(pt reported primarily using manual)  Receives Help From: Home health(HHA 8-10, 2-4 everyday)  ADL Assistance: Needs assistance(pt reported HHA assists with ADLs)  Homemaking Assistance: Needs assistance  Homemaking Responsibilities: No(pt reported HHA complete )  Ambulation Assistance: Needs assistance(pt w/c bound )  Transfer Assistance: Independent(pt reported doing stand-pivot to transfer and sometimes using slide board)  Active : No  Leisure & Hobbies: karli      Objective   Vision: Within Functional Limits  Hearing: Within functional limits    Orientation  Overall Orientation Status: Within Functional Limits     Standing Balance  Sit to stand: Unable to assess  Stand to sit: Unable to assess  ADL  Feeding: Minimal assistance(educated pt on proper body mechanics while eating and sitting up in order to avoid choking.  Pt able to bring bite of food to mouth with HOB elevated slightly with increase time to complete )  Grooming: Stand by assistance(pt washed face while supine in bed )  UE Bathing: Moderate assistance  LE Bathing: Maximum assistance  UE Dressing: Moderate assistance  LE Dressing: Maximum assistance  Toileting: Maximum assistance  Tone RUE  RUE Tone: Normotonic  Coordination  Movements Are Fluid And Coordinated: No  Coordination and Movement description: Fine motor impairments;Gross motor impairments; Left UE     Bed mobility  Rolling to Left: Maximum assistance  Rolling to Right: Maximum assistance  Supine to Sit: Maximum assistance;2 Person assistance  Comment: pt unable to complete supine>sit EOB due to reported pain. Pt will inititate movement and then suddenly returns to supine due to L knee pain. Pt very resistive to any movement of any extremity. Writer and PT assisted with placing blanket between pt LE's in order to prevent skin break down and pt resisted R LE movement, even though previously moved R LE independently   Transfers  Sit to stand: Unable to assess  Stand to sit: Unable to assess     Cognition  Overall Cognitive Status: [facility]     Sensation  Overall Sensation Status: Impaired(pt reported numbness to L UE and L LE due to prior CVA )      LUE AROM : Exceptions  LUE General AROM: pt reported prior CVA which impacted L side   L Shoulder Flexion 0-180: unable to assess due to pt laying on L side   L Elbow Flexion 0-145: 0-30 observed but unable to assess further due to pt laying on L side   L Wrist Flexion 0-80: 0-40  L Wrist Extension 0-70: 0-30    Left Hand PROM: Exceptions  Left Hand General PROM: pt very stiff.  Writer able to passively extend all digits to 90 degrees of MCP and fully extend all DIP and PIP joints     Left Hand AROM: Exceptions  Left Hand General AROM: no active movement observed    RUE AROM : WFL    Right Hand AROM: WFL    LUE Strength  Gross LUE Strength: Exceptions to [facility]  L Hand Grasp: 2-/5  LUE Strength Comment: unable to assess due to pt laying on L side   RUE

## 2019-03-28 NOTE — ED NOTES
Pt refusing CT due to pain. Pt reports she does not want her leg straightened and \"I will start cussing\" . Awaiting ct for further orders. Dr. Paige Sweeney notified.       Consuelo Holbrook RN  03/27/19 2006
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
## 2019-03-28 NOTE — FLOWSHEET NOTE
Assessment: Patient was awake and alert when  visited. Family was not present at the time. However, patient made mention of family. When asked how she was feeling, patient responded; \"horrible. \" Patient said she was raised [employer] and a member of [facility]. Intervention:  maintained listening presence, offered support and prayed with patient. Patient received sacrament of anointing of the sick. Outcome: Patient thanked  for visiting and praying with her. Follow up visits recommended for more spiritual and emotional support. 03/28/19 1318   Encounter Summary   Services provided to: Patient   Support System Family members   Place [address] Visiting   (03/28/2019)   Complexity of Encounter Moderate   Length of Encounter 30 minutes   Spiritual Assessment Completed Yes   Routine   Type Initial   Spiritual/Zoroastrian   Type Spiritual support   Assessment Approachable;Calm; Hopeful   Intervention Active listening;Nurtured hope;Prayer; Anointing   Outcome Expressed gratitude   Sacraments   Sacrament of Sick-Anointing Anointed

## 2019-03-29 PROBLEM — M25.562 KNEE PAIN, LEFT: Status: ACTIVE | Noted: 2019-03-29

## 2019-03-29 PROCEDURE — 6370000000 HC RX 637 (ALT 250 FOR IP): Performed by: EMERGENCY MEDICINE

## 2019-03-29 PROCEDURE — 1200000000 HC SEMI PRIVATE

## 2019-03-29 PROCEDURE — 94640 AIRWAY INHALATION TREATMENT: CPT

## 2019-03-29 PROCEDURE — 97535 SELF CARE MNGMENT TRAINING: CPT

## 2019-03-29 PROCEDURE — 96361 HYDRATE IV INFUSION ADD-ON: CPT

## 2019-03-29 PROCEDURE — 6370000000 HC RX 637 (ALT 250 FOR IP): Performed by: STUDENT IN AN ORGANIZED HEALTH CARE EDUCATION/TRAINING PROGRAM

## 2019-03-29 RX ORDER — ONDANSETRON 4 MG/1
4 TABLET, ORALLY DISINTEGRATING ORAL ONCE
Status: COMPLETED | OUTPATIENT
Start: 2019-03-29 | End: 2019-03-29

## 2019-03-29 RX ORDER — OXYCODONE HYDROCHLORIDE AND ACETAMINOPHEN 5; 325 MG/1; MG/1
1 TABLET ORAL EVERY 4 HOURS PRN
Status: DISCONTINUED | OUTPATIENT
Start: 2019-03-29 | End: 2019-03-30 | Stop reason: HOSPADM

## 2019-03-29 RX ADMIN — TIOTROPIUM BROMIDE 18 MCG: 18 CAPSULE ORAL; RESPIRATORY (INHALATION) at 07:45

## 2019-03-29 RX ADMIN — IBUPROFEN 800 MG: 800 TABLET, FILM COATED ORAL at 17:10

## 2019-03-29 RX ADMIN — OXYCODONE HYDROCHLORIDE AND ACETAMINOPHEN 1 TABLET: 5; 325 TABLET ORAL at 17:10

## 2019-03-29 RX ADMIN — ONDANSETRON 4 MG: 4 TABLET, ORALLY DISINTEGRATING ORAL at 10:41

## 2019-03-29 RX ADMIN — OXYCODONE HYDROCHLORIDE AND ACETAMINOPHEN 1 TABLET: 5; 325 TABLET ORAL at 10:46

## 2019-03-29 RX ADMIN — PANTOPRAZOLE SODIUM 40 MG: 40 TABLET, DELAYED RELEASE ORAL at 10:46

## 2019-03-29 RX ADMIN — LORAZEPAM 0.5 MG: 0.5 TABLET ORAL at 21:07

## 2019-03-29 RX ADMIN — TIZANIDINE 2 MG: 2 TABLET ORAL at 10:45

## 2019-03-29 RX ADMIN — VENLAFAXINE 37.5 MG: 37.5 TABLET ORAL at 21:05

## 2019-03-29 RX ADMIN — VENLAFAXINE 37.5 MG: 37.5 TABLET ORAL at 17:10

## 2019-03-29 RX ADMIN — IBUPROFEN 800 MG: 800 TABLET, FILM COATED ORAL at 10:46

## 2019-03-29 ASSESSMENT — PAIN DESCRIPTION - PROGRESSION

## 2019-03-29 ASSESSMENT — PAIN SCALES - GENERAL
PAINLEVEL_OUTOF10: 9
PAINLEVEL_OUTOF10: 9
PAINLEVEL_OUTOF10: 7

## 2019-03-29 ASSESSMENT — PAIN DESCRIPTION - PAIN TYPE: TYPE: ACUTE PAIN

## 2019-03-29 ASSESSMENT — PAIN DESCRIPTION - LOCATION: LOCATION: KNEE

## 2019-03-29 ASSESSMENT — PAIN DESCRIPTION - ORIENTATION: ORIENTATION: LEFT

## 2019-03-29 NOTE — PROGRESS NOTES
OBS/CDU   RESIDENT NOTE      Patients PCP is:  Brandy Winchester DO        SUBJECTIVE    Patient refused joint aspiration per orthopedics. Considering extension splint with or without, however due to urinary incontinence orthopedics feels this may be difficult. Patient refuses to move knee and participate in most of examination. PHYSICAL EXAM      General: NAD, AO X 3  Heent: EMOI, PERRL  Neck: SUPPLE, NO JVD  Cardiovascular: RRR, S1S2  Pulmonary: CTAB, NO SOB  Abdomen: SOFT, NTTP, ND, +BS  Extremities: +2/4 PULSES DISTAL, NO SWELLING, persistent left knee pain refusing to extend  Neuro / Psych: NO NUMBNESS OR TINGLING, MENTATION AT BASELINE    PERTINENT TEST /EXAMS      I have reviewed all available laboratory results. MEDICATIONS CURRENT     ondansetron (ZOFRAN-ODT) disintegrating tablet 4 mg Once   amLODIPine (NORVASC) tablet 10 mg Daily   LORazepam (ATIVAN) tablet 0.5 mg Q6H PRN   therapeutic multivitamin-minerals 1 tablet Daily   pantoprazole (PROTONIX) tablet 40 mg QAM AC   tiotropium (SPIRIVA) inhalation capsule 18 mcg Daily   venlafaxine (EFFEXOR) tablet 37.5 mg TID   zolpidem (AMBIEN) tablet 5 mg Nightly PRN   clopidogrel (PLAVIX) tablet 75 mg Daily   docusate sodium (COLACE) capsule 100 mg Daily   aspirin EC tablet 81 mg Daily   tiZANidine (ZANAFLEX) tablet 2 mg QAM   sodium chloride flush 0.9 % injection 10 mL 2 times per day   sodium chloride flush 0.9 % injection 10 mL PRN   ibuprofen (ADVIL;MOTRIN) tablet 800 mg Q6H PRN   0.9 % sodium chloride infusion Continuous       All medication charted and reviewed. CONSULTS      IP CONSULT TO ORTHOPEDIC SURGERY    ASSESSMENT/PLAN       Elo Worley is a 77 y.o. female who presents with    1.)1. Acute on chronic left knee pain secondary to acute injury, etiology unknown relieved by rest and no movement.      1. CT left knee - patient refusing test   2. Pain managed with PO tylenol  3. Order labs for septic joint consideration - CRP elevated 18.7  4. PT/OT eval - possible SNF placement   5. Ortho following, possible extension splint today, however concerns for persistent incontinence. 6. If no further interventions order PT eval.         · Continue home medications  · Monitor vitals, labs, and imaging  · DISPO: pending consults and clinical improvement    --  Sintia Jackson  Emergency Medicine Resident Physician     This dictation was generated by voice recognition computer software. Although all attempts are made to edit the dictation for accuracy, there may be errors in the transcription that are not intended.

## 2019-03-29 NOTE — PROGRESS NOTES
Physical Therapy  DATE: 3/29/2019    NAME: Di Bah  MRN: [id number]   : 1950    Patient not seen this date for Physical Therapy due to:  [] Blood transfusion in progress  [] Hemodialysis  [x]  Patient Declined   Pt just finished attempting EOB with OT. Pt stated she did not want to do anymore therapy today.  [] Spine Precautions   [] Strict Bedrest  [] Surgery/ Procedure  [] Testing      [] Other         [] PT being discontinued at this time. Patient independent. No further needs. [] PT being discontinued at this time as the patient has been transferred to palliative care. No further needs.     Oh Roland, PT

## 2019-03-29 NOTE — FLOWSHEET NOTE
Left leg extended as much as pt would allow, cast applied to left leg above and below knee for immobilization per Dr. Janeen Oconnor and Dr. Olive Fong.

## 2019-03-29 NOTE — PROCEDURES
Pt has Medtronic Revo pacemaker 2011 is mri conditional but her leads are not mri conditional.. Oh Shaw pt cannot have mri. ........... Oh Shaw

## 2019-03-29 NOTE — PROGRESS NOTES
Orthopedic Progress Note    Patient:  Alverto [place]  YOB: 1950     77 y.o. female    Subjective:  Patient seen and examined at bedside this morning. Continued left knee pain, mildly improved since admission. Urinary incontinence through the night. Patient NPO since midnight. Pain controlled on current regimen  Denies: fever/chills, HA, CP, SOB, N/V, dysuria, or numbness/tingling in extremities. Objective:   Vitals:    03/29/19 0344   BP: (!) 119/58   Pulse: 92   Resp: 16   Temp: 98 °F (36.7 °C)   SpO2: 98%     Gen: NAD, cooperative, resting comfortably in bed upon arrival to room    Cardiovascular: Regular rate, no dependent edema, distal pulses 2+    Respiratory: Chest symmetric, no accessory muscle use, normal respirations, no audible wheezes    LLE: Skin intact, no erythema or swelling. Global TTP to knee. Refuses knee ROM. Sensation in tact distally and moving toes slightly, though motor limited at baseline due to stroke. No results for input(s): WBC, HGB, HCT, PLT, INR, PTT, NA, K, BUN, CREATININE, GLUCOSE in the last 72 hours. Invalid input(s): PT   Meds:  ASA, Plavix  See rec for complete list    Impression/plan: 77 y.o. female s/p twisting injury to L knee. 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       -Patient NPO overnight for potential placement of extension splint today under anesthesia. Will be difficult to maintain due to urinary incontinence. Will discuss plan with  but likely no intervention.  -Weight Bearing: activity as tolerated  -Pain control and medical management per primary team   -Ice (20 min, 1 hour off) and elevation for edema/pain control  -Encourage IS and deep breathing  -DVT ppx: ASA, Plavix. [id number] Racquel Thao from orthopedic perspective    -Please page ortho with any questions    Yosvany Colvin,    Orthopedic Surgery Resident PGY-1  [facility]          PGY-2 Addendum  Patient seen and examined.  Agree with above. Patient comfortable in bed, though notes pain with any movement of the left leg. Required bedding change overnight due to urinary incontinence. Physical exam as noted above. Pain with attempted movement of left leg. Mild joint effusion. Sensation in tact to distal extremity though hyperesthesias noted to foot. Assessment/Plan:  -Patient refusing aspiration of L knee  -NPO since midnight for potential splint application under anesthesia, though may prove difficult with urinary incontinence.   -Will discuss with Dr. Heaven Eastman, but likely no intervention.  Maintain NPO status for now until discussed with Dr. Heaven Eastman.  -Plan as otherwise noted above      ------------------------------------------------------------------------------    Cecilie Bernheim DO, PGY-2  Orthopedic Surgery Resident  [facility]

## 2019-03-29 NOTE — CARE COORDINATION
Spoke with pt regarding transition planning. Plan for Dr Sammi Christensen to place cast on RLE today. She lives alone and has an aide for 2 hours a day. She states her goal is home with [facility]. If she works with PT and is not able to manage safely at home she has been at [facility] in the past and would be agreeable to go there again. Referral sent. [address] Yuki Rondon from [place] [address] - gave her referral information. She will review and start precert if able to accept. Spoke with Ramila Granado at [facility]. Confirmed pt is current. Able to accept at [place].     [id number] spoke with Yesenia Perez from [place] [address]. Insurance denied SNF. Need peer to peer done within an hour [phone]. Message to OBS resident with peer to peer info. [id number] spoke with Yesenia Perez from [place] [address]. They can accept pt under her medicaid if needed. Can accept this weekend. Call the center over weekend if pt is ready for discharge.

## 2019-03-29 NOTE — PLAN OF CARE
BRONCHOSPASM/BRONCHOCONSTRICTION     ? IMPROVE AERATION/BREATH SOUNDS  ? ADMINISTER BRONCHODILATOR THERAPY AS APPROPRIATE  ? ASSESS BREATH SOUNDS    ?    PATIENT EDUCATION AS NEEDED

## 2019-03-30 ENCOUNTER — HOSPITAL ENCOUNTER (OUTPATIENT)
Age: 69
Setting detail: OBSERVATION
Discharge: NURSING, REHAB OR LONG-TERM CARE | DRG: 556 | End: 2019-04-02
Attending: EMERGENCY MEDICINE | Admitting: EMERGENCY MEDICINE
Payer: COMMERCIAL

## 2019-03-30 ENCOUNTER — APPOINTMENT (OUTPATIENT)
Dept: GENERAL RADIOLOGY | Age: 69
DRG: 556 | End: 2019-03-30
Payer: COMMERCIAL

## 2019-03-30 VITALS
HEART RATE: 75 BPM | SYSTOLIC BLOOD PRESSURE: 120 MMHG | WEIGHT: 94.1 LBS | HEIGHT: 60 IN | DIASTOLIC BLOOD PRESSURE: 69 MMHG | OXYGEN SATURATION: 91 % | TEMPERATURE: 98.8 F | BODY MASS INDEX: 18.47 KG/M2 | RESPIRATION RATE: 15 BRPM

## 2019-03-30 DIAGNOSIS — M25.562 ACUTE PAIN OF LEFT KNEE: Primary | ICD-10-CM

## 2019-03-30 PROCEDURE — 6370000000 HC RX 637 (ALT 250 FOR IP): Performed by: EMERGENCY MEDICINE

## 2019-03-30 PROCEDURE — 94640 AIRWAY INHALATION TREATMENT: CPT

## 2019-03-30 PROCEDURE — G0378 HOSPITAL OBSERVATION PER HR: HCPCS

## 2019-03-30 PROCEDURE — 6370000000 HC RX 637 (ALT 250 FOR IP): Performed by: STUDENT IN AN ORGANIZED HEALTH CARE EDUCATION/TRAINING PROGRAM

## 2019-03-30 PROCEDURE — 73562 X-RAY EXAM OF KNEE 3: CPT

## 2019-03-30 PROCEDURE — 99284 EMERGENCY DEPT VISIT MOD MDM: CPT

## 2019-03-30 RX ORDER — DOCUSATE SODIUM 100 MG/1
100 CAPSULE, LIQUID FILLED ORAL 2 TIMES DAILY
Status: DISCONTINUED | OUTPATIENT
Start: 2019-03-31 | End: 2019-04-02 | Stop reason: HOSPADM

## 2019-03-30 RX ORDER — LORAZEPAM 0.5 MG/1
0.5 TABLET ORAL EVERY 6 HOURS PRN
Status: DISCONTINUED | OUTPATIENT
Start: 2019-03-30 | End: 2019-04-02 | Stop reason: HOSPADM

## 2019-03-30 RX ORDER — PANTOPRAZOLE SODIUM 40 MG/1
40 TABLET, DELAYED RELEASE ORAL
Status: DISCONTINUED | OUTPATIENT
Start: 2019-03-31 | End: 2019-04-02 | Stop reason: HOSPADM

## 2019-03-30 RX ORDER — METAXALONE 800 MG/1
800 TABLET ORAL 3 TIMES DAILY
Status: DISCONTINUED | OUTPATIENT
Start: 2019-03-31 | End: 2019-04-02 | Stop reason: HOSPADM

## 2019-03-30 RX ORDER — ASPIRIN 81 MG/1
81 TABLET ORAL DAILY
Status: DISCONTINUED | OUTPATIENT
Start: 2019-03-31 | End: 2019-04-02 | Stop reason: HOSPADM

## 2019-03-30 RX ORDER — CLOPIDOGREL BISULFATE 75 MG/1
75 TABLET ORAL DAILY
Status: DISCONTINUED | OUTPATIENT
Start: 2019-03-31 | End: 2019-04-02 | Stop reason: HOSPADM

## 2019-03-30 RX ORDER — OXYCODONE HYDROCHLORIDE AND ACETAMINOPHEN 5; 325 MG/1; MG/1
1 TABLET ORAL ONCE
Status: COMPLETED | OUTPATIENT
Start: 2019-03-30 | End: 2019-03-30

## 2019-03-30 RX ORDER — SUCRALFATE 1 G/1
1 TABLET ORAL 4 TIMES DAILY
Status: DISCONTINUED | OUTPATIENT
Start: 2019-03-31 | End: 2019-04-02 | Stop reason: HOSPADM

## 2019-03-30 RX ORDER — OXYCODONE HYDROCHLORIDE AND ACETAMINOPHEN 5; 325 MG/1; MG/1
1 TABLET ORAL EVERY 6 HOURS PRN
Status: DISCONTINUED | OUTPATIENT
Start: 2019-03-30 | End: 2019-04-02 | Stop reason: HOSPADM

## 2019-03-30 RX ORDER — SODIUM CHLORIDE 0.9 % (FLUSH) 0.9 %
10 SYRINGE (ML) INJECTION EVERY 12 HOURS SCHEDULED
Status: DISCONTINUED | OUTPATIENT
Start: 2019-03-31 | End: 2019-04-02 | Stop reason: HOSPADM

## 2019-03-30 RX ORDER — AMLODIPINE BESYLATE 10 MG/1
10 TABLET ORAL DAILY
Status: DISCONTINUED | OUTPATIENT
Start: 2019-03-31 | End: 2019-04-02 | Stop reason: HOSPADM

## 2019-03-30 RX ORDER — SODIUM CHLORIDE 0.9 % (FLUSH) 0.9 %
10 SYRINGE (ML) INJECTION PRN
Status: DISCONTINUED | OUTPATIENT
Start: 2019-03-30 | End: 2019-04-02 | Stop reason: HOSPADM

## 2019-03-30 RX ORDER — VENLAFAXINE 37.5 MG/1
37.5 TABLET ORAL 3 TIMES DAILY
Status: DISCONTINUED | OUTPATIENT
Start: 2019-03-31 | End: 2019-04-02 | Stop reason: HOSPADM

## 2019-03-30 RX ORDER — TRAZODONE HYDROCHLORIDE 50 MG/1
50 TABLET ORAL NIGHTLY
Status: DISCONTINUED | OUTPATIENT
Start: 2019-03-31 | End: 2019-04-02 | Stop reason: HOSPADM

## 2019-03-30 RX ORDER — ZOLPIDEM TARTRATE 5 MG/1
5 TABLET ORAL NIGHTLY PRN
Status: DISCONTINUED | OUTPATIENT
Start: 2019-03-30 | End: 2019-04-02 | Stop reason: HOSPADM

## 2019-03-30 RX ADMIN — AMLODIPINE BESYLATE 10 MG: 10 TABLET ORAL at 08:53

## 2019-03-30 RX ADMIN — OXYCODONE HYDROCHLORIDE AND ACETAMINOPHEN 1 TABLET: 5; 325 TABLET ORAL at 22:23

## 2019-03-30 RX ADMIN — OXYCODONE HYDROCHLORIDE AND ACETAMINOPHEN 1 TABLET: 5; 325 TABLET ORAL at 06:30

## 2019-03-30 RX ADMIN — CLOPIDOGREL 75 MG: 75 TABLET, FILM COATED ORAL at 08:52

## 2019-03-30 RX ADMIN — OXYCODONE HYDROCHLORIDE AND ACETAMINOPHEN 1 TABLET: 5; 325 TABLET ORAL at 14:19

## 2019-03-30 RX ADMIN — ASPIRIN 81 MG: 81 TABLET ORAL at 08:52

## 2019-03-30 RX ADMIN — PANTOPRAZOLE SODIUM 40 MG: 40 TABLET, DELAYED RELEASE ORAL at 08:52

## 2019-03-30 RX ADMIN — VENLAFAXINE 37.5 MG: 37.5 TABLET ORAL at 15:21

## 2019-03-30 RX ADMIN — DOCUSATE SODIUM 100 MG: 100 CAPSULE, LIQUID FILLED ORAL at 08:52

## 2019-03-30 RX ADMIN — TIOTROPIUM BROMIDE 18 MCG: 18 CAPSULE ORAL; RESPIRATORY (INHALATION) at 08:37

## 2019-03-30 RX ADMIN — VENLAFAXINE 37.5 MG: 37.5 TABLET ORAL at 08:53

## 2019-03-30 RX ADMIN — MULTIPLE VITAMINS W/ MINERALS TAB 1 TABLET: TAB at 08:53

## 2019-03-30 RX ADMIN — TIZANIDINE 2 MG: 2 TABLET ORAL at 08:53

## 2019-03-30 ASSESSMENT — ENCOUNTER SYMPTOMS
CONSTIPATION: 0
COUGH: 0
ABDOMINAL PAIN: 0
BACK PAIN: 0
CHEST TIGHTNESS: 0
SHORTNESS OF BREATH: 0
DIARRHEA: 0
VOMITING: 0
NAUSEA: 0
EYE PAIN: 0

## 2019-03-30 ASSESSMENT — PAIN DESCRIPTION - ORIENTATION: ORIENTATION: LEFT

## 2019-03-30 ASSESSMENT — PAIN DESCRIPTION - PROGRESSION: CLINICAL_PROGRESSION: GRADUALLY IMPROVING

## 2019-03-30 ASSESSMENT — PAIN SCALES - GENERAL
PAINLEVEL_OUTOF10: 10
PAINLEVEL_OUTOF10: 8
PAINLEVEL_OUTOF10: 10
PAINLEVEL_OUTOF10: 10

## 2019-03-30 ASSESSMENT — PAIN DESCRIPTION - PAIN TYPE: TYPE: ACUTE PAIN

## 2019-03-30 ASSESSMENT — PAIN DESCRIPTION - FREQUENCY: FREQUENCY: CONTINUOUS

## 2019-03-30 ASSESSMENT — PAIN DESCRIPTION - ONSET: ONSET: ON-GOING

## 2019-03-30 ASSESSMENT — PAIN DESCRIPTION - DESCRIPTORS: DESCRIPTORS: ACHING

## 2019-03-30 ASSESSMENT — PAIN DESCRIPTION - LOCATION: LOCATION: KNEE

## 2019-03-30 NOTE — PROGRESS NOTES
Patient is adamant about wanting to be discharged home. Patient met with Dr. Noelle Schwartz (orthopedic surgeon ) and he agrees patient may be discharged home with her existing home care. Case management services are in work finding patient transportation for patient and have explained the options and risks and benefits of SNF and patient will be discharged home.

## 2019-03-30 NOTE — CARE COORDINATION
Transitional Planning  Met with patient to discuss transitional plan. Patient plans to return home, does not wish to go to SNF. States she has been wheelchair bound for years and has been able to care for herself with home health assistance. Call to [facility] spoke to [facility], states patient's nurse has no concerns that she is aware of. Notified of discharge home today. AVS with completed MINESH faxed to [facility].     Discharge [address] [place] [address] Case Management Department  Written by: Greg Mackey RN    Patient Name: Angelika Jay  Attending Provider: Raul Gallardo MD  Admit Date: 3/27/2019  6:28 PM  MRN: [id number]  Account: [de-identified]                     : 1950  Discharge Date:  19       Disposition: home with [facility]    Greg Mackey RN

## 2019-03-30 NOTE — PLAN OF CARE
Problem: Falls - Risk of:  Goal: Will remain free from falls  Description  Will remain free from falls  3/30/2019 1710 by Rosina Shepherd RN  Outcome: Ongoing  3/30/2019 0506 by Zackary Mendoza RN  Outcome: Ongoing  Goal: Absence of physical injury  Description  Absence of physical injury  3/30/2019 1710 by Rosina Shepherd RN  Outcome: Ongoing  3/30/2019 0506 by Zackary Mendoza RN  Outcome: Ongoing     Problem: Risk for Impaired Skin Integrity  Goal: Tissue integrity - skin and mucous membranes  Description  Structural intactness and normal physiological function of skin and  mucous membranes.   3/30/2019 1710 by Rosina Shepherd RN  Outcome: Ongoing  3/30/2019 0506 by Zackary Mendoza RN  Outcome: Ongoing     Problem: Pain:  Goal: Pain level will decrease  Description  Pain level will decrease  Outcome: Ongoing  Goal: Control of acute pain  Description  Control of acute pain  Outcome: Ongoing  Goal: Control of chronic pain  Description  Control of chronic pain  Outcome: Ongoing

## 2019-03-30 NOTE — PROGRESS NOTES
OBS/CDU   RESIDENT NOTE      Patients PCP is:  Dulce Maria Bowden DO        SUBJECTIVE      No acute events overnight. Has been able to tolerate a general diet  without nausea or vomiting. Patient placed in cast yesterday. Planning for transfer to [facility] today . PHYSICAL EXAM      General: NAD, AO X 3  Heent: EMOI, PERRL  Neck: SUPPLE, NO JVD  Cardiovascular: RRR, S1S2  Pulmonary: CTAB, NO SOB  Abdomen: SOFT, NTTP, ND, +BS  Extremities: +2/4 PULSES DISTAL, LLE placed in cast  Neuro / Psych: NO NUMBNESS OR TINGLING, MENTATION AT BASELINE    PERTINENT TEST /EXAMS      I have reviewed all available laboratory results. MEDICATIONS CURRENT     oxyCODONE-acetaminophen (PERCOCET) 5-325 MG per tablet 1 tablet Q4H PRN   amLODIPine (NORVASC) tablet 10 mg Daily   LORazepam (ATIVAN) tablet 0.5 mg Q6H PRN   therapeutic multivitamin-minerals 1 tablet Daily   pantoprazole (PROTONIX) tablet 40 mg QAM AC   tiotropium (SPIRIVA) inhalation capsule 18 mcg Daily   venlafaxine (EFFEXOR) tablet 37.5 mg TID   zolpidem (AMBIEN) tablet 5 mg Nightly PRN   clopidogrel (PLAVIX) tablet 75 mg Daily   docusate sodium (COLACE) capsule 100 mg Daily   aspirin EC tablet 81 mg Daily   tiZANidine (ZANAFLEX) tablet 2 mg QAM   sodium chloride flush 0.9 % injection 10 mL 2 times per day   sodium chloride flush 0.9 % injection 10 mL PRN   ibuprofen (ADVIL;MOTRIN) tablet 800 mg Q6H PRN   0.9 % sodium chloride infusion Continuous       All medication charted and reviewed. CONSULTS      IP CONSULT TO ORTHOPEDIC SURGERY    ASSESSMENT/PLAN       Jonas Grady is a 77 y.o. female who presents with    1. )Jonas Grady is a 77 y.o. female who presents with     )1. Acute on chronic left knee pain secondary to acute injury, etiology unknown relieved by rest and no movement.      1. CT left knee - patient refusing test   2. Pain managed with PO tylenol  3. Order labs for septic joint consideration - CRP elevated 18.7  4.  PT/OT eval - possible SNF placement   5. Ortho following, possible extension splint today, however concerns for persistent incontinence. - placed LLE cast 3/29  6. Awaiting transfer for placement            · Continue home medications  · Monitor vitals, labs, and imaging  · DISPO: pending consults and clinical improvement    --  Jennifer Wilson  Emergency Medicine Resident Physician     This dictation was generated by voice recognition computer software. Although all attempts are made to edit the dictation for accuracy, there may be errors in the transcription that are not intended.

## 2019-03-30 NOTE — PROGRESS NOTES
Physical Therapy  DATE: 3/30/2019    NAME: Loraine Carson  MRN: [id number]   : 1950    Patient not seen this date for Physical Therapy due to:  [] Blood transfusion in progress  [] Hemodialysis  [x]  Patient Declined---pt adamantly refusing due to pain, not feeling well, and \"I dont want therapy\"  [] Spine Precautions   [] Strict Bedrest  [] Surgery/ Procedure  [] Testing      [] Other        [] PT being discontinued at this time. Patient independent. No further needs. [] PT being discontinued at this time as the patient has been transferred to palliative care. No further needs.     Chito Alonzo, CHARU

## 2019-03-30 NOTE — PROGRESS NOTES
Pt due to be discharged to outside facility (SNF). Dr. Miri Santizo at bedside with pt discussing home options. Pt explained to him that she had enough home health assistance to adequately care for her current needs. Discharge plan was then changed to home with home health assistance.

## 2019-03-30 NOTE — PROGRESS NOTES
CDU Daily Progress Note  Attending Physician       Pt Name: Keshia Joseph  MRN: [id number]  [place] 1950  Date of evaluation: 3/30/19    I performed a history and physical examination of the patient and discussed management with the resident. I reviewed the residents note and agree with the documented findings and plan of care. Any areas of disagreement are noted on the chart. I was personally present for the key portions of any procedures. I have documented in the chart those procedures where I was not present during the key portions. I have reviewed the emergency nurses triage note. I agree with the chief complaint, past medical history, past surgical history, allergies, medications, social and family history as documented unless otherwise noted below. Documentation of the HPI, Physical Exam and Medical Decision Making performed by medical students or scribes is based on my personal performance of the HPI, PE and MDM. For Physician Assistant/ Nurse Practitioner cases/documentation I have personally evaluated this patient and have completed at least one if not all key elements of the E/M (history, physical exam, and MDM). Additional findings are as noted. The Family History, Social History and Review of Systems are unchanged from the previous day. No significant events overnight. Ortho applied splint to L leg.  OK for transfer to SNF    Reyes Derry, MD  Attending Physician  Critical Decision Unit

## 2019-03-30 NOTE — PROGRESS NOTES
Orthopedic Progress Note    Patient:  Ned Noriega  YOB: 1950     77 y.o. female    Subjective:  Patient seen and examined  Complaining of left leg pain from the knee to the foot. Does not feel the cast is causing irritation  No issue overnight  Pain controlled   Denies fever, HA, CP, SOB, N/V, dysuria    Vitals reviewed, afebrile    Objective:   Vitals:    03/30/19 0744   BP: 109/64   Pulse: 87   Resp: 15   Temp: 98.2 °F (36.8 °C)   SpO2: 91%     Gen: NAD, cooperative    LLE: cast in place, c/d/i. No TTP to proximal thigh, patient has pain with palpation of the foot and ankle. Minimal toe flexion and extension, ankle dorsiflexion and plantar flexion secondary to pain. Endorses sensation to Sural/saphenous/SP/DP/Platnar sensory nerve distributions, DP 2+ w/ BCR. No results for input(s): WBC, HGB, HCT, PLT, INR, PTT, NA, K, BUN, CREATININE, GLUCOSE in the last 72 hours. Invalid input(s): PT   Meds: ASA, Plavix   See rec for complete list    Impression/plan: 77 y.o. female s/p twisting injury to L knee. 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       -Patient unable to obtain MRI of the left knee due to incompatibility with leads of Pacemaker  -Cast placed yesterday for attempted treatment of contracture  -Will repeat left knee films after cast placement  -Weight Bearing: activity as tolerated with cast on  -Pain control and medical management per primary team   -Ice (20 min, 1 hour off) and elevation for edema/pain control  -Encourage IS and deep breathing  -DVT ppx: ASA, Plavix. Ok from orthopedic perspective   -Please page ortho with any questions          Jessie Charles DO   Orthopedic Surgery Resident PGY-2  [address] [place] [address]      I was physically present during the entire  procedure.

## 2019-03-31 PROCEDURE — 6370000000 HC RX 637 (ALT 250 FOR IP): Performed by: STUDENT IN AN ORGANIZED HEALTH CARE EDUCATION/TRAINING PROGRAM

## 2019-03-31 PROCEDURE — 6370000000 HC RX 637 (ALT 250 FOR IP): Performed by: EMERGENCY MEDICINE

## 2019-03-31 PROCEDURE — G0378 HOSPITAL OBSERVATION PER HR: HCPCS

## 2019-03-31 PROCEDURE — 94640 AIRWAY INHALATION TREATMENT: CPT

## 2019-03-31 PROCEDURE — 94664 DEMO&/EVAL PT USE INHALER: CPT

## 2019-03-31 RX ORDER — OXYCODONE HYDROCHLORIDE 5 MG/1
5 TABLET ORAL ONCE
Status: COMPLETED | OUTPATIENT
Start: 2019-03-31 | End: 2019-03-31

## 2019-03-31 RX ORDER — IBUPROFEN 400 MG/1
600 TABLET ORAL ONCE
Status: DISCONTINUED | OUTPATIENT
Start: 2019-03-31 | End: 2019-04-02 | Stop reason: HOSPADM

## 2019-03-31 RX ADMIN — SUCRALFATE 1 G: 1 TABLET ORAL at 10:38

## 2019-03-31 RX ADMIN — SUCRALFATE 1 G: 1 TABLET ORAL at 16:35

## 2019-03-31 RX ADMIN — VENLAFAXINE 37.5 MG: 37.5 TABLET ORAL at 10:38

## 2019-03-31 RX ADMIN — DOCUSATE SODIUM 100 MG: 100 CAPSULE, LIQUID FILLED ORAL at 19:43

## 2019-03-31 RX ADMIN — DOCUSATE SODIUM 100 MG: 100 CAPSULE, LIQUID FILLED ORAL at 10:39

## 2019-03-31 RX ADMIN — SUCRALFATE 1 G: 1 TABLET ORAL at 19:42

## 2019-03-31 RX ADMIN — ASPIRIN 81 MG: 81 TABLET ORAL at 10:38

## 2019-03-31 RX ADMIN — OXYCODONE HYDROCHLORIDE AND ACETAMINOPHEN 1 TABLET: 5; 325 TABLET ORAL at 16:35

## 2019-03-31 RX ADMIN — AMLODIPINE BESYLATE 10 MG: 10 TABLET ORAL at 10:37

## 2019-03-31 RX ADMIN — OXYCODONE HYDROCHLORIDE AND ACETAMINOPHEN 1 TABLET: 5; 325 TABLET ORAL at 23:24

## 2019-03-31 RX ADMIN — VENLAFAXINE 37.5 MG: 37.5 TABLET ORAL at 19:42

## 2019-03-31 RX ADMIN — VENLAFAXINE 37.5 MG: 37.5 TABLET ORAL at 16:35

## 2019-03-31 RX ADMIN — PANTOPRAZOLE SODIUM 40 MG: 40 TABLET, DELAYED RELEASE ORAL at 10:37

## 2019-03-31 RX ADMIN — TIOTROPIUM BROMIDE 18 MCG: 18 CAPSULE ORAL; RESPIRATORY (INHALATION) at 08:23

## 2019-03-31 RX ADMIN — TRAZODONE HYDROCHLORIDE 50 MG: 50 TABLET ORAL at 19:42

## 2019-03-31 RX ADMIN — OXYCODONE HYDROCHLORIDE AND ACETAMINOPHEN 1 TABLET: 5; 325 TABLET ORAL at 10:39

## 2019-03-31 RX ADMIN — OXYCODONE HYDROCHLORIDE 5 MG: 5 TABLET ORAL at 21:45

## 2019-03-31 RX ADMIN — CLOPIDOGREL 75 MG: 75 TABLET, FILM COATED ORAL at 10:37

## 2019-03-31 ASSESSMENT — PAIN SCALES - GENERAL
PAINLEVEL_OUTOF10: 9
PAINLEVEL_OUTOF10: 9
PAINLEVEL_OUTOF10: 10
PAINLEVEL_OUTOF10: 9

## 2019-03-31 NOTE — FLOWSHEET NOTE
03/31/19 1723   Encounter Summary   Services provided to: Patient   Referral/Consult From: [address] [facility] Not right now   Continue Visiting   (3/31/2019)   Complexity of Encounter Low   Length of Encounter 15 minutes   Spiritual Assessment Completed Yes   Routine   Type Initial   Assessment Calm; Approachable;Coping   Intervention Active listening;Explored feelings, thoughts, concerns;Nurtured hope;Prayer;Sustaining presence/ Ministry of presence   Outcome Expressed gratitude;Engaged in conversation;Expressed feelings/needs/concerns;Coping;Receptive   Spiritual/Zoroastrianism   Type Spiritual support   · Facts:  visited with patient during to offer spiritual and emotional support. Patient was alone at the time of the visit and accepted the visit.  engaged patient in conversation, explored feelings and concerns. Patient stated that she was experiencing pain. She stated that she was discharge a few dats ago and had to return. Patient stated that she has to decide if she wants to do therapy.  affirm feelings, nurtured hope and provided support. · Feelings: Patient expressed that she was feeling a lot of pain in her leg. · Family: Patient stated that she has no family and she lives alone. · Adrianne: Patient did not mention a Jewish when asked but did accept prayer and thanked . Follow-up:  will remain available for spiritual and emotional support.

## 2019-03-31 NOTE — H&P
[facility]  CDU / OBSERVATION eNCOUnter  Resident Note     Pt Name: Mariela Worley  MRN: [id number]  Birthdate 1950  Date of evaluation: 3/31/19  Patient's PCP is :  Obie Ferreira DO    CHIEF COMPLAINT       Chief Complaint   Patient presents with    Other     Pt needs placement for PT/OT rehab         HISTORY OF PRESENT ILLNESS    Mariela Worley is a 77 y.o. female who returns to the emergency  department, after patient was recommended to go to skilled nursing facility however patient wanted to go home with home health. Patient's home health agency deemed that her immobilization of her leg was not suitable for home health to take care of her at home. Patient was sent back to the emergency department for reevaluation for ECF versus rehab placement. Patient states her knee pain is continued and denies any new symptoms. Patient failed home health care outpatient trial of discharge. Patient needs placement. Location/Symptom: left knee pain   Timing/Onset: sudden   Provocation: trauma   Quality:   Radiation:   Severity:  Timing/Duration: sudden   Modifying Factors:  PO percocet     REVIEW OF SYSTEMS       General ROS - No fevers, No malaise   Ophthalmic ROS - No discharge, No changes in vision  ENT ROS -  No sore throat, No rhinorrhea,   Respiratory ROS - no shortness of breath, no cough, no  wheezing  Cardiovascular ROS - No chest pain, no dyspnea on exertion  Gastrointestinal ROS - No abdominal pain, no nausea or vomiting, no change in bowel habits, no black or bloody stools  Genito-Urinary ROS - No dysuria, trouble voiding, or hematuria  Musculoskeletal ROS - No myalgias, No arthalgias  Neurological ROS - No headache, no dizziness/lightheadedness, No focal weakness, no loss of sensation  Dermatological ROS - No lesions, No rash     (PQRS) Advance directives on face sheet per hospital policy.  No change unless specifically mentioned in chart    PAST MEDICAL HISTORY    has a past medical history of Abnormal computed tomography of cervical spine, CVA (cerebral vascular accident) ([address] Paraproteinemia, and Weight loss. I have reviewed the past medical history with the patient and it is pertinent to this complaint. SURGICAL HISTORY      has a past surgical history that includes pacemaker placement (07/2011). I have reviewed and agree with Surgical History entered and it is pertinent to this complaint. CURRENT MEDICATIONS       amLODIPine (NORVASC) tablet 10 mg Daily   aspirin EC tablet 81 mg Daily   clopidogrel (PLAVIX) tablet 75 mg Daily   docusate sodium (COLACE) capsule 100 mg BID   LORazepam (ATIVAN) tablet 0.5 mg Q6H PRN   metaxalone (SKELAXIN) tablet 800 mg TID   pantoprazole (PROTONIX) tablet 40 mg QAM AC   sucralfate (CARAFATE) tablet 1 g 4x Daily   tiotropium (SPIRIVA) inhalation capsule 18 mcg Daily   traZODone (DESYREL) tablet 50 mg Nightly   venlafaxine (EFFEXOR) tablet 37.5 mg TID   zolpidem (AMBIEN) tablet 5 mg Nightly PRN   sodium chloride flush 0.9 % injection 10 mL 2 times per day   sodium chloride flush 0.9 % injection 10 mL PRN   enoxaparin (LOVENOX) injection 40 mg Daily   oxyCODONE-acetaminophen (PERCOCET) 5-325 MG per tablet 1 tablet Q6H PRN       All medication charted and reviewed. ALLERGIES     is allergic to penicillins and amoxicillin. FAMILY HISTORY     has no family status information on file. family history is not on file. The patient denies any pertinent family history. I have reviewed and agree with the family history entered. I have reviewed the Family History and it is not significant to the case    SOCIAL HISTORY      reports that she has been smoking. She has a 30.00 pack-year smoking history. She has never used smokeless tobacco. She reports that she drinks about 16.8 oz of alcohol per week. She reports that she does not use drugs.   I have reviewed and agree with all Social.  There are no concerns for substance by mouth Percocet. 1.  Patient failed outpatient home therapy. Patient was discharged home with a return in less than 5 hours to hospital.  2.  PT/OT eval  3. Extended care facility placement      · Continue home medications  · Monitor vitals, labs, and imaging  · DISPO: pending consults and clinical improvement    CONSULTS:    IP CONSULT TO ORTHOPEDIC SURGERY    PROCEDURES:  Not indicated       PATIENT REFERRED TO:    DO Parish Flores [address]. [address], DO   Emergency Medicine Resident     This dictation was generated by voice recognition computer software. Although all attempts are made to edit the dictation for accuracy, there may be errors in the transcription that are not intended.
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
## 2019-03-31 NOTE — ED TRIAGE NOTES
Pt arrives to the ED via M13 for complaints of inability to care for self. Pt had a recent knee injury and was discharged home after refusing to go to a rehab facility for PT/OT. Pt was sent back to the ED by her home health care nurse due to inability to complete necessary ADL's. Upon arrival pt is alert and oriented x 4, pt left leg is wrapped in an ACE wrap, CNS intact distal to the ACE wrap. Pt denies being able to ambulate at this time.

## 2019-03-31 NOTE — ED PROVIDER NOTES
[facility] ED  Emergency Department Encounter  Emergency Medicine Resident Note     Pt Name: Ayse Slaughter  MRN: [id number]  Birthdate 1950   Date of evaluation: 3/30/2019  PCP:  Dolly Calhoun DO    CHIEF COMPLAINT       Chief Complaint   Patient presents with    Other     Pt needs placement for PT/OT rehab       HISTORY OF PRESENT ILLNESS  (Location/Symptom, Timing/Onset, Context/Setting, Quality, Duration, Modifying Factors, Severity.)      Ayse Slaughter is a 77 y.o. female who presents with left knee pain 4-5 days. Patient was admitted to the hospital for knee pain and valuable orthopedic surgery. No acute injury or tenderness was found for knee pain, however she was casted in order to increase extension of the knee. Patient is wheelchair-bound. Initially was recommended for ECF secondary to the new pain in the past, however patient had requested to go home with home health. Initially they had a prove this and patient had gone home today. However she is either by home health and there was concern due to her new immobilization of her leg that she was not suited for her home health agency will take care of her at home. They wanted to be reevaluated for ECF versus rehab placement. Patient has no new symptoms. Complaining of her knee pain is continued. States that she last taken Percocet at approximately 2-3 PM.  Denies any numbness or tingling of her foot. Denies shortness breath or chest pain. PAST MEDICAL / SURGICAL / SOCIAL / FAMILY HISTORY     Past Medical History:  has a past medical history of Abnormal computed tomography of cervical spine, CVA (cerebral vascular accident) ([address] Paraproteinemia, and Weight loss. Past Surgical History:  has a past surgical history that includes pacemaker placement (07/2011). Allergies:  Penicillins and Amoxicillin     Home Meds:   Prior to Visit Medications    Medication Sig Taking?  Authorizing Provider   sucralfate tobacco. She reports that she drinks about 16.8 oz of alcohol per week. She reports that she does not use drugs. She has no sexual activity history on file. REVIEW OF SYSTEMS    (2-9 systems for level 4, 10 or more for level 5)      Review of Systems   Constitutional: Negative for chills and fever. Eyes: Negative for pain and visual disturbance. Respiratory: Negative for cough, chest tightness and shortness of breath. Cardiovascular: Negative for chest pain and palpitations. Gastrointestinal: Negative for abdominal pain, constipation, diarrhea, nausea and vomiting. Genitourinary: Negative for dysuria and frequency. Musculoskeletal: Positive for arthralgias. Negative for back pain, joint swelling and myalgias. Skin: Negative for rash and wound. Neurological: Negative for dizziness, weakness, light-headedness, numbness and headaches. PHYSICAL EXAM   (up to 7 for level 4, 8 or more for level 5)      Initial Vitals   ED Triage Vitals [03/30/19 2109]   BP Temp Temp Source Pulse Resp SpO2 Height Weight   126/71 98 °F (36.7 °C) Oral 103 18 100 % -- --       Physical Exam   Constitutional: She is oriented to person, place, and time. She appears well-developed and well-nourished. HENT:   Head: Normocephalic and atraumatic. Eyes: Conjunctivae and EOM are normal.   Neck: Normal range of motion. Neck supple. Cardiovascular: Normal rate and regular rhythm. Exam reveals no gallop and no friction rub. No murmur heard. Tachycardic by triage, but on my exam was a heart rate of 86. Palpable DP and PT pulses   Pulmonary/Chest: Effort normal. No stridor. No respiratory distress. She has no wheezes. Abdominal: Soft. She exhibits no distension. There is no tenderness. There is no guarding. Musculoskeletal:   Has left knee and a cast.  She has normal range motion ankle and toes. Soft compartments. No edema. Neurological: She is alert and oriented to person, place, and time.    Sensation intact to the left lower foot. Skin: Skin is warm and dry. Capillary refill takes less than 2 seconds. Nursing note and vitals reviewed. DIFFERENTIAL DIAGNOSIS/IMPRESSION     DDX: Acute knee pain secondary to contraction    Impression: 77 y.o. female who presents with recent admission for acute knee pain placed in a cast by orthopedics to help extension contracture to see this helps with pain. Home health agency unable to care for her in the current condition. Her for sent back for reassessment. On exam, she has cast in place that does not appear to be too tight. Does not have any new edema. Has good sensation. Given the patient does have an acute new condition that did have a recommended placement, we will bring patient back into the hospital for acute knee pain with a new immobilization to be reevaluated for ECF/rehab. Patient is comfort with this plan. DIAGNOSTIC RESULTS     EKG: All EKG's are interpreted by the Emergency Department Physician who either signs or Co-signs this chart in the absence of a cardiologist.    Not clinically indicated at this time. LABS: Labswere reviewed by me and abnormal results are displayed above     Labs Reviewed - No data to display    RADIOLOGY: All plain film, CT, MRI, and formal ultrasound images (except ED bedside ultrasound) are read by the radiologist, see reports below, unless otherwise noted in ED Course, MDM or here. Xr Knee Left (3 Views)    Result Date: 3/30/2019  EXAMINATION: 3 XRAY VIEWS OF THE LEFT KNEE 3/30/2019 10:58 am COMPARISON: March 27, 2018 HISTORY: ORDERING SYSTEM PROVIDED HISTORY: F/u L knee pain after cast TECHNOLOGIST PROVIDED HISTORY: AP, oblique, lateral F/u L knee pain after cast FINDINGS: Marked osteopenia. Interval casting, which degrades fine osseous detail question cortical offset in the lateral femoral metaphysis. No malalignment identified. No significant joint effusion. Interval casting.   Question nondisplaced fracture in the lateral femoral metaphysis. Osteopenia. BEDSIDE ULTRASOUND:  Not clinically indicated at this time. ED COURSE      ED Medication Orders (From admission, onward)    Start Ordered     Status Ordering Provider    03/30/19 2130 03/30/19 2124  oxyCODONE-acetaminophen (PERCOCET) 5-325 MG per tablet 1 tablet  ONCE      Ordered MARCELO ROD          EMERGENCYDEPARTMENT COURSE:    See above    PROCEDURES:  None     CONSULTS:  IP CONSULT TO ORTHOPEDIC SURGERY    CRITICAL CARE:  0 mins not including procedure time, please also see attending note    FINAL IMPRESSION      1. Acute pain of left knee          DISPOSITION / PLAN     DISPOSITION Admitted 03/30/2019 09:29:16 PM      PATIENT REFERRED TO:  DO Parish Lee 70.   [facility] [id number] [phone]            DISCHARGE MEDICATIONS:  New Prescriptions    No medications on file       Neel Marks MD  Emergency Medicine Resident      (Please note that portions of this note were completed with a voice recognition program.  Efforts were made to edit the dictations but occasionallywords are mis-transcribed.)         Neel Marks MD  Resident  03/30/19 2135

## 2019-03-31 NOTE — ED NOTES
Bed: 21  Expected date: 3/30/19  Expected time:   Means of arrival:   Comments:  Medic [address], [facility]  03/30/19 3657

## 2019-03-31 NOTE — DISCHARGE INSTR - COC
Continuity of Care Form    Patient Name: Angelika Jay   :  1950  MRN:  [id number]    Admit date:  3/30/2019  Discharge date:  2019    Code Status Order: Full Code   Advance Directives:   Advance Care Flowsheet Documentation     Date/Time Healthcare Directive Type of Healthcare Directive Copy in [address] Agent's Name Healthcare Agent's Phone Number    [phone]  No, patient does not have an advance directive for healthcare treatment -- -- -- -- --          Admitting Physician:  Raul Gallardo MD  PCP: Erinn Tilley DO    Discharging Nurse: [facility] Unit/Room#: 0135/5118-51  Discharging Unit Phone Number: ***    Emergency Contact:   Extended Emergency Contact Information  Primary Emergency Contact: Ed Landaverde  Home Phone: [phone]  Relation: Brother/Sister    Past Surgical History:  Past Surgical History:   Procedure Laterality Date    PACEMAKER PLACEMENT  2011    Pacemaker is Medtronic Revo (compatible). Leads placed in  are NOT MRI compatible. Placed at [place]. V's per Dr. Saba Mcelroy can not have an MRI. Immunization History: There is no immunization history on file for this patient.     Active Problems:  Patient Active Problem List   Diagnosis Code    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    CVA (cerebral vascular accident) ([facility] [address] I63.9    Abnormal computed tomography of cervical spine R93.7    Weight loss R63.4    Functional gait abnormality R26.89    Left knee pain M25.562    Knee pain, left M25.562    Acute pain of left knee M25.562       Isolation/Infection:   Isolation          No Isolation            Nurse Assessment:  Last Vital Signs: /68   Pulse 87   Temp 97.5 °F (36.4 °C)   Resp 18   Ht 5' (1.524 m)   Wt 89 lb (40.4 kg)   SpO2 94%   BMI 17.38 kg/m²     Last documented pain score (0-10 scale): Pain Level: 9  Last Weight:   Wt Readings from Last 1 Encounters:   19 89 lb (40.4 kg)     Mental Status: oriented    IV Access:  [address] [facility] IV ACCESS:377094541}    Nursing Mobility/ADLs:  Walking   Dependent  Transfer  Assisted  Bathing  Dependent  Dressing  Assisted  Toileting  Dependent  Feeding  Independent  Med Admin  Assisted  Med Delivery   whole    Wound Care Documentation and Therapy:        Elimination:  Continence:   · Bowel: No  · Bladder: Yes  Urinary Catheter: None   Colostomy/Ileostomy/Ileal Conduit: {YES / XJ:10842}       Date of Last BM: ***  No intake or output data in the 24 hours ending 19 1257  No intake/output data recorded.     Safety Concerns:     [address] Safety Concerns:176135660}    Impairments/Disabilities:      [address] [place] [address] Impairments/Disabilities:799090837}    Nutrition Therapy:  Current Nutrition Therapy:   [address] [place] [address] Diet List:279529205}    Routes of Feeding: {CHP DME Other Feedings:638420151}  Liquids: {Slp liquid thickness:83087}  Daily Fluid Restriction: {CHP DME Yes amt example:978997968}  Last Modified Barium Swallow with Video (Video Swallowing Test): {Done Not Done [facility]:770495494}    Treatments at the Time of Hospital Discharge:   Respiratory Treatments: ***  Oxygen Therapy:  {Therapy; copd oxygen:36838}  Ventilator:    {[facility] Vent COLA:333797217}    Rehab Therapies: {THERAPEUTIC INTERVENTION:8685933030}  Weight Bearing Status/Restrictions: [address] [place] [address] Weight Bearin}  Other Medical Equipment (for information only, NOT a DME order):  {EQUIPMENT:673622628}  Other Treatments: ***    Patient's personal belongings (please select all that are sent with patient):  {P DME Belongings:320216171}    RN SIGNATURE:  {Esignature:297813330}    CASE MANAGEMENT/SOCIAL WORK SECTION    Inpatient Status Date: ***    Readmission Risk Assessment Score:  Readmission Risk              Risk of Unplanned Readmission:        10           Discharging to Facility/ Agency   · Name: [facility]   · Address:  · Phone:[phone]  · Fax: [phone]    Dialysis Facility (if applicable) · Name:  · Address:  · Dialysis Schedule:  · Phone:  · Fax:    / signature: Electronically signed by Tani Merino RN on 4/1/19 at 11:07 AM    PHYSICIAN SECTION    Prognosis: Good    Condition at Discharge: Stable    Rehab Potential (if transferring to Rehab): Good    Recommended Labs or Other Treatments After Discharge:  P t should be sitting out of bed in a chair. Please call [facility] and make an appoint ment to see Dr. Brent Harrison on Wed. Morning on April 17th. Physician Certification: I certify the above information and transfer of Lisandra Barboza  is necessary for the continuing treatment of the diagnosis listed and that she requires [facility] for less 30 days.      Update Admission H&P: No change in H&P    PHYSICIAN SIGNATURE:  Electronically signed by Colletta Czar, MD on 3/31/19 at 12:57 PM

## 2019-03-31 NOTE — CARE COORDINATION
1: 50 p.m. Aubrie LEONARD, spoke to Sony at [facility] and she relayed that there is a bed available and they will begin the precert on 04 01 19. Call to   León Morel at [address], [address] [address]       Phone: [phone]       Fax: [phone]        And spoke with admission. They are checking into the requirements of accepting the patient.

## 2019-03-31 NOTE — DISCHARGE SUMMARY
CDU Discharge Summary        Patient:  Lanie Paget  YOB: 1950    MRN: [id number]   Acct: [de-identified]    Primary Care Physician: Griffin Hester DO    Admit date:  3/27/2019  6:28 PM  Discharge date: 3/30/2019  5:28 PM     Discharge Diagnoses:     1. Acute on chronic left knee pain secondary to recent acute injury not involving fall, relieved by rest and cast placed, pain treated with by mouth Percocet. Follow-up:  Call today/tomorrow for a follow up appointment with Griffin Hester DO , or return to the Emergency Room with worsening symptoms    Stressed to patient the importance of following up with primary care doctor for further workup/management of symptoms. Pt verbalizes understanding and agrees with plan. Discharge Medication Changes:       Medication List      CONTINUE taking these medications    amLODIPine 10 MG tablet  Commonly known as:  NORVASC     aspirin 81 MG tablet     clopidogrel 75 MG tablet  Commonly known as:  PLAVIX  TAKE 1 TABLET DAILY     DOCQLACE 100 MG capsule  Generic drug:  docusate sodium  TAKE 1 CAPSULE BY MOUTH IN THE MORNING & IN THE EVENING -DRINK PLENTYOF WATER WHILE TAKING THIS MEDICINE     LORazepam 0.5 MG tablet  Commonly known as:  ATIVAN     metaxalone 800 MG tablet  Commonly known as:  SKELAXIN     omeprazole 20 MG delayed release capsule  Commonly known as:  PRILOSEC     SACROSIDASE PO     sucralfate 1 GM tablet  Commonly known as:  CARAFATE     therapeutic multivitamin-minerals tablet     tiotropium 18 MCG inhalation capsule  Commonly known as:  SPIRIVA     tiZANidine 2 MG tablet  Commonly known as:  ZANAFLEX     traZODone 50 MG tablet  Commonly known as:  DESYREL     venlafaxine 37.5 MG tablet  Commonly known as:  [facility]     Wheelchair Misc  Power wheelchair with left fupper extremity support  Dx: stroke with left hemiparesis.         STOP taking these medications    metoclopramide 5 MG tablet  Commonly known as:  REGLAN     zolpidem 5 MG

## 2019-03-31 NOTE — ED PROVIDER NOTES
[facility] ED  eMERGENCY dEPARTMENT eNCOUnter   Attending Attestation     Pt Name: Loraine Carson  MRN: [id number]  Cathigfdeepak 1950  Date of evaluation: 3/30/19       Loraine Carson is a 77 y.o. female who presents with Other (Pt needs placement for PT/OT rehab)      History: Pt presents with left knee pain which is chronic. Pt had leg placed in cast in extension due to developing contracture and discharged home to home healthcare. Pt did not want to go to to rehab but home health admin stated they could not take care of her when she got home. Exam: pt has normal pulse, cap refill, color of the left lower foot. Cast in place. Plan for observation and ortho to see. I performed a history and physical examination of the patient and discussed management with the resident. I reviewed the residents note and agree with the documented findings and plan of care. Any areas of disagreement are noted on the chart. I was personally present for the key portions of any procedures. I have documented in the chart those procedures where I was not present during the key portions. I have personally reviewed all images and agree with the resident's interpretation. I have reviewed the emergency nurses triage note. I agree with the chief complaint, past medical history, past surgical history, allergies, medications, social and family history as documented unless otherwise noted below. Documentation of the HPI, Physical Exam and Medical Decision Making performed by medical students or scribes is based on my personal performance of the HPI, PE and MDM. For Phys Assistant/ Nurse Practitioner cases/documentation I have had a face to face evaluation of this patient and have completed at least one if not all key elements of the E/M (history, physical exam, and MDM). Additional findings are as noted.     For APC cases I have personally evaluated and examined the patient in conjunction with the APC and agree with the treatment plan and disposition of the patient as recorded by the APC.     Meme Wilkins MD  Attending Emergency  Physician        Luciano Martinez MD  03/30/19 9467

## 2019-03-31 NOTE — PROGRESS NOTES
[address] [facility]  CDU / OBSERVATION eNCOUnter  Attending NOte       I performed a history and physical examination of the patient and discussed management with the resident. I reviewed the residents note and agree with the documented findings and plan of care. Any areas of disagreement are noted on the chart. I was personally present for the key portions of any procedures. I have documented in the chart those procedures where I was not present during the key portions. I have reviewed the nurses notes. I agree with the chief complaint, past medical history, past surgical history, allergies, medications, social and family history as documented unless otherwise noted below. The Family history, social history, and ROS are effectively unchanged since admission unless noted elsewhere in the chart. Unable to ambulate secondary to dislocation of knee and immobilization. Patient had inability to care for self at home and inability to ambulate or perform activities of daily living at home. Patient with significant difficulties with both pain control and mobility. Unless discharge patient was only able to remain 2 hours at home prior to meeting readmission secondary to inability to handle self in home environment. Admitted for safety issues, need for further assessment and placement to the [facility].     Estella Milton MD  Attending Emergency  Physician

## 2019-04-01 PROCEDURE — 6370000000 HC RX 637 (ALT 250 FOR IP): Performed by: EMERGENCY MEDICINE

## 2019-04-01 PROCEDURE — G0378 HOSPITAL OBSERVATION PER HR: HCPCS

## 2019-04-01 PROCEDURE — 2580000003 HC RX 258: Performed by: EMERGENCY MEDICINE

## 2019-04-01 PROCEDURE — 97162 PT EVAL MOD COMPLEX 30 MIN: CPT

## 2019-04-01 PROCEDURE — 94640 AIRWAY INHALATION TREATMENT: CPT

## 2019-04-01 PROCEDURE — 6360000002 HC RX W HCPCS: Performed by: EMERGENCY MEDICINE

## 2019-04-01 PROCEDURE — 97530 THERAPEUTIC ACTIVITIES: CPT

## 2019-04-01 PROCEDURE — 97535 SELF CARE MNGMENT TRAINING: CPT

## 2019-04-01 PROCEDURE — 6370000000 HC RX 637 (ALT 250 FOR IP): Performed by: STUDENT IN AN ORGANIZED HEALTH CARE EDUCATION/TRAINING PROGRAM

## 2019-04-01 PROCEDURE — 96372 THER/PROPH/DIAG INJ SC/IM: CPT

## 2019-04-01 PROCEDURE — 97166 OT EVAL MOD COMPLEX 45 MIN: CPT

## 2019-04-01 RX ORDER — ONDANSETRON 4 MG/1
4 TABLET, FILM COATED ORAL EVERY 8 HOURS PRN
Status: DISCONTINUED | OUTPATIENT
Start: 2019-04-01 | End: 2019-04-02 | Stop reason: HOSPADM

## 2019-04-01 RX ORDER — OXYCODONE HYDROCHLORIDE AND ACETAMINOPHEN 5; 325 MG/1; MG/1
1 TABLET ORAL EVERY 6 HOURS PRN
Qty: 12 TABLET | Refills: 0 | Status: SHIPPED | OUTPATIENT
Start: 2019-04-01 | End: 2019-04-04

## 2019-04-01 RX ORDER — IBUPROFEN 600 MG/1
600 TABLET ORAL ONCE
Qty: 120 TABLET | Refills: 1 | Status: SHIPPED | OUTPATIENT
Start: 2019-04-01 | End: 2019-04-11

## 2019-04-01 RX ADMIN — VENLAFAXINE 37.5 MG: 37.5 TABLET ORAL at 20:19

## 2019-04-01 RX ADMIN — SUCRALFATE 1 G: 1 TABLET ORAL at 20:19

## 2019-04-01 RX ADMIN — OXYCODONE HYDROCHLORIDE AND ACETAMINOPHEN 1 TABLET: 5; 325 TABLET ORAL at 16:06

## 2019-04-01 RX ADMIN — PANTOPRAZOLE SODIUM 40 MG: 40 TABLET, DELAYED RELEASE ORAL at 10:19

## 2019-04-01 RX ADMIN — DOCUSATE SODIUM 100 MG: 100 CAPSULE, LIQUID FILLED ORAL at 10:18

## 2019-04-01 RX ADMIN — ENOXAPARIN SODIUM 40 MG: 40 INJECTION SUBCUTANEOUS at 10:20

## 2019-04-01 RX ADMIN — VENLAFAXINE 37.5 MG: 37.5 TABLET ORAL at 16:07

## 2019-04-01 RX ADMIN — METAXALONE 800 MG: 800 TABLET ORAL at 16:06

## 2019-04-01 RX ADMIN — Medication 10 ML: at 10:23

## 2019-04-01 RX ADMIN — ONDANSETRON HYDROCHLORIDE 4 MG: 4 TABLET, FILM COATED ORAL at 23:08

## 2019-04-01 RX ADMIN — DOCUSATE SODIUM 100 MG: 100 CAPSULE, LIQUID FILLED ORAL at 20:18

## 2019-04-01 RX ADMIN — TRAZODONE HYDROCHLORIDE 50 MG: 50 TABLET ORAL at 20:18

## 2019-04-01 RX ADMIN — METAXALONE 800 MG: 800 TABLET ORAL at 10:19

## 2019-04-01 RX ADMIN — SUCRALFATE 1 G: 1 TABLET ORAL at 10:19

## 2019-04-01 RX ADMIN — OXYCODONE HYDROCHLORIDE AND ACETAMINOPHEN 1 TABLET: 5; 325 TABLET ORAL at 07:53

## 2019-04-01 RX ADMIN — METAXALONE 800 MG: 800 TABLET ORAL at 20:19

## 2019-04-01 RX ADMIN — AMLODIPINE BESYLATE 10 MG: 10 TABLET ORAL at 10:19

## 2019-04-01 RX ADMIN — ASPIRIN 81 MG: 81 TABLET ORAL at 10:19

## 2019-04-01 RX ADMIN — OXYCODONE HYDROCHLORIDE AND ACETAMINOPHEN 1 TABLET: 5; 325 TABLET ORAL at 23:08

## 2019-04-01 RX ADMIN — SUCRALFATE 1 G: 1 TABLET ORAL at 16:06

## 2019-04-01 RX ADMIN — CLOPIDOGREL 75 MG: 75 TABLET, FILM COATED ORAL at 10:18

## 2019-04-01 RX ADMIN — VENLAFAXINE 37.5 MG: 37.5 TABLET ORAL at 10:19

## 2019-04-01 RX ADMIN — TIOTROPIUM BROMIDE 18 MCG: 18 CAPSULE ORAL; RESPIRATORY (INHALATION) at 08:26

## 2019-04-01 ASSESSMENT — PAIN SCALES - GENERAL
PAINLEVEL_OUTOF10: 9
PAINLEVEL_OUTOF10: 8
PAINLEVEL_OUTOF10: 6
PAINLEVEL_OUTOF10: 8
PAINLEVEL_OUTOF10: 8
PAINLEVEL_OUTOF10: 9

## 2019-04-01 ASSESSMENT — PAIN DESCRIPTION - ORIENTATION
ORIENTATION: LEFT
ORIENTATION: LEFT

## 2019-04-01 ASSESSMENT — PAIN DESCRIPTION - PAIN TYPE: TYPE: ACUTE PAIN

## 2019-04-01 ASSESSMENT — PAIN DESCRIPTION - LOCATION
LOCATION: KNEE
LOCATION: KNEE

## 2019-04-01 ASSESSMENT — PAIN DESCRIPTION - PROGRESSION: CLINICAL_PROGRESSION: GRADUALLY IMPROVING

## 2019-04-01 NOTE — PROGRESS NOTES
less than 5 hours to hospital.  2.  PT/OT eval  3. Extended care facility placement  4. Likely discharge today. · Continue home medications  · Monitor vitals, labs, and imaging  · DISPO: pending consults and clinical improvement    --  Jennifer Wilson  Emergency Medicine Resident Physician     This dictation was generated by voice recognition computer software. Although all attempts are made to edit the dictation for accuracy, there may be errors in the transcription that are not intended.

## 2019-04-01 NOTE — PROGRESS NOTES
Physical Therapy    Facility/Department: [address] MED SURG  Initial Assessment    NAME: Jonas Grady  : 1950  MRN: [id number]    Date of Service: 2019    Discharge Recommendations:    Further therapy recommended at discharge. Assessment   Body structures, Functions, Activity limitations: Decreased functional mobility ; Decreased ROM; Decreased strength;Decreased safe awareness;Decreased endurance;Decreased ADL status; Decreased balance  Assessment: Continues with increased pain wih minimal mobilization. The pt was unable to tolerate an attempt to stand at the bedside. Activity Tolerance  Activity Tolerance: Patient limited by fatigue;Patient limited by pain       Patient Diagnosis(es): The encounter diagnosis was Acute pain of left knee. has a past medical history of Abnormal computed tomography of cervical spine, CVA (cerebral vascular accident) ([facility] [address] Paraproteinemia, and Weight loss. has a past surgical history that includes pacemaker placement (2011). Restrictions  Restrictions/Precautions  Restrictions/Precautions: Fall Risk  Required Braces or Orthoses?: No  Position Activity Restriction  Other position/activity restrictions: up with assist, L hemiplegia from prior CVA   Vision/Hearing        Subjective  General  Patient assessed for rehabilitation services?: Yes  Response To Previous Treatment: Not applicable  Family / Caregiver Present: No  Follows Commands: Within Functional Limits  Subjective  Subjective: RN and pt agreeable to PT.  Pt supine in bed upon arrival, complains of severe L knee pain  Pain Screening  Patient Currently in Pain: Yes  Pain Assessment  Pain Assessment: 0-10  Pain Level: 8  Pain Location: Knee  Pain Orientation: Left  Clinical Progression: Gradually improving  Vital Signs  Patient Currently in Pain: Yes       Orientation  Orientation  Overall Orientation Status: Within Normal Limits  Social/Functional History  Social/Functional History  Lives With: Alone  Type of Home: Apartment  Home Layout: One level  Home Access: Level entry  Bathroom Shower/Tub: Walk-in shower  Bathroom Toilet: Standard  Bathroom Equipment: Grab bars in shower, Shower chair, Grab bars around toilet  Bathroom Accessibility: Wheelchair accessible  Home Equipment: Hospital bed, [employer], [place] Help From: Home health(7 days a week, 2 hours in morning and 2 hours in afternoon)  ADL Assistance: Needs assistance(pt reported HHA assist with all ADLs)  Homemaking Assistance: Needs assistance  Homemaking Responsibilities: No(pt reported HHA completes IADLs)  Ambulation Assistance: Needs assistance(w/c bound )  Transfer Assistance: Independent(pt reported completing stand-pivot transfers )  Active : No  Patient's  Info: Does not drive  Mode of Transportation: Cab, Family, Friends  Leisure & Hobbies: karli   Cognition        Objective          AROM RLE (degrees)  RLE AROM: WNL  AROM LLE (degrees)  LLE AROM : (Cast in place across the knee)  AROM RUE (degrees)  RUE AROM : WNL  AROM LUE (degrees)  LUE AROM : (N/T extensor senergy pattern in place)  Strength RLE  Comment: At least 3/5 based on observed mobility. Pt would not allow overpressure for MMT due to pain in L knee  Strength LLE  Strength LLE: (N/T, cast in place)  Strength RUE  Strength RUE: WFL  Strength LUE  Strength LUE: (0/5)     Sensation  Overall Sensation Status: Impaired  Bed mobility  Rolling to Left: Moderate assistance;2 Person assistance  Rolling to Right: Moderate assistance;2 Person assistance  Supine to Sit: Moderate assistance;2 Person assistance  Sit to Supine: Moderate assistance;2 Person assistance  Scooting: Maximal assistance     Ambulation  Ambulation?: No  Ambulation 1  Assistance:  Moderate assistance;2 Person assistance  Distance: supine to sit with mod assist x 2   The pt was unable to attempt to stand secondary to a c/o increased pain in her L LE  Balance  Posture: Flip Michael  Sitting - Static: Fair  Sitting - Dynamic: Poor        Plan   Plan  Times per week: 5x  Current Treatment Recommendations: Strengthening, Balance Training, Functional Mobility Training, Transfer Training, Endurance Training, Patient/Caregiver Education & Training, Equipment Evaluation, Education, & procurement, Safety Education & Training, ROM  Safety Devices  Type of devices: Left in bed, Call light within reach, Nurse notified, Bed alarm in place    G-Code       OutComes Score      AM-PAC Score  AM-PAC Inpatient Mobility Raw Score : 8  AM-PAC Inpatient T-Scale Score : 28.52  Mobility Inpatient CMS 0-100% Score: 86.62  Mobility Inpatient CMS G-Code Modifier : CM          Goals  Short term goals  Time Frame for Short term goals: 10 visits  Short term goal 1: supine to sit with min assist  Short term goal 2: bed to chair with min assist  Short term goal 3: 20 min exercise program x SBA  Patient Goals   Patient goals : Return home       Therapy Time   Individual Concurrent Group Co-treatment   Time In 0800         Time Out 0825         Minutes 25             1 of 800 [facility], PT

## 2019-04-01 NOTE — PROGRESS NOTES
OBS/CDU   RESIDENT NOTE FOR INPATIENT STATUS      INPATIENT       The Patient Now Meets Inpatient Criteria as of 03/27/19  @  18:28     For the Following reasons:    [x]   Severity of Signs/ Symptoms indicate admission need   [x]   Medical Condition Would be Threatened in lower level of care   []   Diagnostic studies procedures results justify inpatient care   [x]   Adverse event likely if not inpatient admission   []   Pre-existing conditions warrants inpatient care     The patient requires inpatient care for further evaluation of their Left knee pain [M25.562]  Knee pain, left [M25.562]

## 2019-04-01 NOTE — PROGRESS NOTES
Occupational Therapy   Occupational Therapy Initial Assessment  Date: 2019   Patient Name: Petrona Kline  MRN: [id number]     : 1950    Date of Service: 2019    Discharge Recommendations:    Further therapy recommended at discharge. Assessment   Performance deficits / Impairments: Decreased functional mobility ; Decreased strength;Decreased endurance;Decreased ADL status; Decreased high-level IADLs  Treatment Diagnosis: L knee pain   Prognosis: Good  Decision Making: Medium Complexity  Patient Education: pt ed on POC, purpose of eval, importance of continued movement, pursed lip breathing tech. fair return   REQUIRES OT FOLLOW UP: Yes  Activity Tolerance  Activity Tolerance: Patient limited by pain; Patient Tolerated treatment well  Safety Devices  Safety Devices in place: Yes  Type of devices: Call light within reach; Left in bed        Patient Diagnosis(es): The encounter diagnosis was Acute pain of left knee. has a past medical history of Abnormal computed tomography of cervical spine, CVA (cerebral vascular accident) ([facility] [address] Paraproteinemia, and Weight loss. has a past surgical history that includes pacemaker placement (2011). Treatment Diagnosis: L knee pain       Restrictions  Restrictions/Precautions  Restrictions/Precautions: Fall Risk  Required Braces or Orthoses?: No  Position Activity Restriction  Other position/activity restrictions: up with assist, L hemiplegia from prior CVA     Subjective   General  Chart Reviewed: No  Patient assessed for rehabilitation services?: Yes  Family / Caregiver Present: No  Diagnosis: L knee pain   Subjective  Subjective: co-eval with PT  General Comment  Comments: RN ok'd for therapy this morning.  Pt agreeable to participate in session and cooperative throughout   Pain Assessment  Pain Assessment: 0-10  Pain Level: 8  Pain Type: Acute pain  Pain Location: Knee  Pain Orientation: Left  Non-Pharmaceutical Pain Intervention(s): Therapeutic presence, Distraction    Oxygen Therapy  O2 Device: None (Room air)     Social/Functional History  Social/Functional History  Lives With: Alone  Type of Home: Apartment  Home Layout: One level  Home Access: Level entry  Bathroom Shower/Tub: Walk-in shower  Bathroom Toilet: Standard  Bathroom Equipment: Grab bars in shower, Shower chair, Grab bars around toilet  Bathroom Accessibility: Wheelchair accessible  Home Equipment: Hospital bed, Giselle Mallory Help From: Home health(7 days a week, 2 hours in morning and 2 hours in afternoon)  ADL Assistance: Needs assistance(pt reported HHA assist with all ADLs)  Homemaking Assistance: Needs assistance  Homemaking Responsibilities: No(pt reported HHA completes IADLs)  Ambulation Assistance: Needs assistance(w/c bound )  Transfer Assistance: Independent(pt reported completing stand-pivot transfers )  Active : No  Patient's  Info: Does not drive  Mode of Transportation: Cab, Family, Friends  Leisure & Hobbies: [employer]      Objective   Vision: Within Functional Limits  Hearing: Within functional limits    Orientation  Overall Orientation Status: Within Functional Limits     Balance  Sitting Balance: Supervision(~6 minutes on EOB )  Standing Balance  Sit to stand: Unable to assess  Stand to sit: Unable to assess     ADL  Feeding: Minimal assistance  Grooming: Stand by assistance(pt washed face while sitting on EOB )  UE Bathing: Moderate assistance  LE Bathing: Maximum assistance  UE Dressing: Moderate assistance(pt assisted with doffing/donning hospital gown while sitting on EOB. Pt required assistance with L UE movement )  LE Dressing: Maximum assistance  Toileting: Maximum assistance  Tone RUE  RUE Tone: Normotonic  Coordination  Movements Are Fluid And Coordinated: No  Coordination and Movement description: Fine motor impairments;Gross motor impairments; Left UE     Bed mobility  Supine to Sit: Moderate assistance;2 Person assistance  Sit to Supine: Moderate assistance;2 Person assistance  Scooting: Maximal assistance  Transfers  Sit to stand: Unable to assess  Stand to sit: Unable to assess     Cognition  Overall Cognitive Status: WFL     Sensation  Overall Sensation Status: Impaired(pt reported numbness to L UE and L LE due to prior CVA )     LUE PROM: Exceptions  L Shoulder Flex  0-180: 0-60 due to stiffness   L Elbow Flexion 0-145: 0-90   L Wrist Flex 0-80: 0-40  L Wrist Ext 0-70: 0-30    LUE AROM : Exceptions  LUE General AROM: pt reported prior CVA which impacted L side   L Elbow Flexion 0-145: 0-20  L Wrist Flexion 0-80: 0-40  L Wrist Extension 0-70: 0-30    Left Hand PROM: Exceptions  Left Hand General PROM: pt very stiff.  Writer able to passively extend all digits to 90 degrees of MCP and fully extend all DIP and PIP joints     Left Hand AROM: Exceptions  Left Hand General AROM: no active movement observed    RUE AROM : WFL    Right Hand AROM: WFL    LUE Strength  Gross LUE Strength: Exceptions to [facility]  L Hand Grasp: 1/5  RUE Strength  Gross RUE Strength: Exceptions to [facility] PEMBROLegalGuru  R Hand Grasp: 3+/5  RUE Strength Comment: overall muscle strength 3+/5      Plan   Plan  Times per week: 3-4x/wk    AM-PAC Score   AM-[facility] Inpatient Daily Activity Raw Score: 14  AM-PAC Inpatient ADL T-Scale Score : 33.39  ADL Inpatient CMS 0-100% Score: 59.67  ADL Inpatient CMS G-Code Modifier : CK    Goals  Short term goals  Time Frame for Short term goals: pt will, by discharge  Short term goal 1: dem min A during bed mobility in order to increase independence   Short term goal 2: dem10+ minutes dynamic sitting balance on EOB in order to participate in ADLs  Short term goal 3: complete UB ADLs with min A, setup and modified tech  Short term goal 4: complete LB ADLs with mod A, set up and AE, as needed  Short term goal 5: dem 1-2 non medication pain       Therapy Time   Individual Concurrent Group Co-treatment   Time In 0805         Time Out 0822

## 2019-04-01 NOTE — PROGRESS NOTES
[facility]  CDU / OBSERVATION eNCOUnter  Attending NOte       I performed a history and physical examination of the patient and discussed management with the resident. I reviewed the residents note and agree with the documented findings and plan of care. Any areas of disagreement are noted on the chart. I was personally present for the key portions of any procedures. I have documented in the chart those procedures where I was not present during the key portions. I have reviewed the nurses notes. I agree with the chief complaint, past medical history, past surgical history, allergies, medications, social and family history as documented unless otherwise noted below. The Family history, social history, and ROS are effectively unchanged since admission unless noted elsewhere in the chart. 49-year-old female with history of knee dislocation. Knee casted by orthopedic surgery to attempt to treat contracture. She can bear weight on that leg as long as the cast is on. Could not be cared for at home by home health. PT/OT on board. Awaiting ECF placement. Patient smokes 40 cigarettes per day for 10 years. Smoking cessation counseling for 3 minutes. Discussed the effects of smoking in regards to cardiac disease, lung disease. I explained how this negatively effects their condition, will contribute to increased recovery time, and possible lead to further health problems in the future.       Meche Samayoa MD  Attending Emergency  Physician

## 2019-04-01 NOTE — PROGRESS NOTES
Still complains of pain and does not like to move the ankle or even the toes because it hurts her. Excellent color and foot warm and pink with excellent cap refill and passive motion of the toes and the ankle tolerated well without any pain at all. From ortho point can go to SNF and follow up in the ortho clinic in 2 weeks from this WED.  (April 17th)

## 2019-04-02 VITALS
RESPIRATION RATE: 20 BRPM | SYSTOLIC BLOOD PRESSURE: 129 MMHG | DIASTOLIC BLOOD PRESSURE: 81 MMHG | WEIGHT: 89 LBS | HEART RATE: 117 BPM | OXYGEN SATURATION: 95 % | TEMPERATURE: 98 F | BODY MASS INDEX: 17.47 KG/M2 | HEIGHT: 60 IN

## 2019-04-02 LAB
-: ABNORMAL
AMORPHOUS: ABNORMAL
BACTERIA: ABNORMAL
BILIRUBIN URINE: NEGATIVE
CASTS UA: ABNORMAL /LPF (ref 0–2)
COLOR: ABNORMAL
COMMENT UA: ABNORMAL
CRYSTALS, UA: ABNORMAL /HPF
EPITHELIAL CELLS UA: ABNORMAL /HPF (ref 0–5)
GLUCOSE URINE: NEGATIVE
KETONES, URINE: NEGATIVE
LEUKOCYTE ESTERASE, URINE: ABNORMAL
MUCUS: ABNORMAL
NITRITE, URINE: NEGATIVE
OTHER OBSERVATIONS UA: ABNORMAL
PH UA: 8 (ref 5–8)
PROTEIN UA: ABNORMAL
RBC UA: ABNORMAL /HPF (ref 0–2)
RENAL EPITHELIAL, UA: ABNORMAL /HPF
SPECIFIC GRAVITY UA: 1.01 (ref 1–1.03)
TRICHOMONAS: ABNORMAL
TURBIDITY: ABNORMAL
URINE HGB: ABNORMAL
UROBILINOGEN, URINE: NORMAL
WBC UA: ABNORMAL /HPF (ref 0–5)
YEAST: ABNORMAL

## 2019-04-02 PROCEDURE — G0378 HOSPITAL OBSERVATION PER HR: HCPCS

## 2019-04-02 PROCEDURE — 6370000000 HC RX 637 (ALT 250 FOR IP): Performed by: EMERGENCY MEDICINE

## 2019-04-02 PROCEDURE — 94640 AIRWAY INHALATION TREATMENT: CPT

## 2019-04-02 PROCEDURE — 81001 URINALYSIS AUTO W/SCOPE: CPT

## 2019-04-02 PROCEDURE — 96372 THER/PROPH/DIAG INJ SC/IM: CPT

## 2019-04-02 PROCEDURE — 87088 URINE BACTERIA CULTURE: CPT

## 2019-04-02 PROCEDURE — 6360000002 HC RX W HCPCS: Performed by: EMERGENCY MEDICINE

## 2019-04-02 PROCEDURE — 87086 URINE CULTURE/COLONY COUNT: CPT

## 2019-04-02 PROCEDURE — 87186 SC STD MICRODIL/AGAR DIL: CPT

## 2019-04-02 RX ORDER — OXYCODONE HYDROCHLORIDE 5 MG/1
5 TABLET ORAL ONCE
Status: COMPLETED | OUTPATIENT
Start: 2019-04-02 | End: 2019-04-02

## 2019-04-02 RX ORDER — PROMETHAZINE HYDROCHLORIDE 25 MG/ML
12.5 INJECTION, SOLUTION INTRAMUSCULAR; INTRAVENOUS ONCE
Status: COMPLETED | OUTPATIENT
Start: 2019-04-02 | End: 2019-04-02

## 2019-04-02 RX ADMIN — TIOTROPIUM BROMIDE 18 MCG: 18 CAPSULE ORAL; RESPIRATORY (INHALATION) at 08:46

## 2019-04-02 RX ADMIN — OXYCODONE HYDROCHLORIDE 5 MG: 5 TABLET ORAL at 06:17

## 2019-04-02 RX ADMIN — SUCRALFATE 1 G: 1 TABLET ORAL at 10:03

## 2019-04-02 RX ADMIN — VENLAFAXINE 37.5 MG: 37.5 TABLET ORAL at 10:03

## 2019-04-02 RX ADMIN — OXYCODONE HYDROCHLORIDE AND ACETAMINOPHEN 1 TABLET: 5; 325 TABLET ORAL at 05:13

## 2019-04-02 RX ADMIN — LORAZEPAM 0.5 MG: 0.5 TABLET ORAL at 12:33

## 2019-04-02 RX ADMIN — CLOPIDOGREL 75 MG: 75 TABLET, FILM COATED ORAL at 10:03

## 2019-04-02 RX ADMIN — PROMETHAZINE HYDROCHLORIDE 12.5 MG: 25 INJECTION INTRAMUSCULAR; INTRAVENOUS at 02:37

## 2019-04-02 RX ADMIN — ASPIRIN 81 MG: 81 TABLET ORAL at 10:03

## 2019-04-02 RX ADMIN — PANTOPRAZOLE SODIUM 40 MG: 40 TABLET, DELAYED RELEASE ORAL at 12:24

## 2019-04-02 RX ADMIN — METAXALONE 800 MG: 800 TABLET ORAL at 10:04

## 2019-04-02 RX ADMIN — OXYCODONE HYDROCHLORIDE AND ACETAMINOPHEN 1 TABLET: 5; 325 TABLET ORAL at 12:33

## 2019-04-02 RX ADMIN — DOCUSATE SODIUM 100 MG: 100 CAPSULE, LIQUID FILLED ORAL at 10:03

## 2019-04-02 RX ADMIN — AMLODIPINE BESYLATE 10 MG: 10 TABLET ORAL at 10:03

## 2019-04-02 RX ADMIN — ENOXAPARIN SODIUM 40 MG: 40 INJECTION SUBCUTANEOUS at 10:04

## 2019-04-02 ASSESSMENT — PAIN DESCRIPTION - PAIN TYPE
TYPE: ACUTE PAIN
TYPE: ACUTE PAIN

## 2019-04-02 ASSESSMENT — PAIN SCALES - GENERAL
PAINLEVEL_OUTOF10: 10
PAINLEVEL_OUTOF10: 9
PAINLEVEL_OUTOF10: 5
PAINLEVEL_OUTOF10: 10
PAINLEVEL_OUTOF10: 9

## 2019-04-02 ASSESSMENT — PAIN DESCRIPTION - ONSET
ONSET: ON-GOING
ONSET: ON-GOING

## 2019-04-02 ASSESSMENT — PAIN DESCRIPTION - LOCATION
LOCATION: KNEE
LOCATION: KNEE

## 2019-04-02 ASSESSMENT — PAIN DESCRIPTION - DESCRIPTORS
DESCRIPTORS: SHARP
DESCRIPTORS: SHARP

## 2019-04-02 ASSESSMENT — PAIN DESCRIPTION - FREQUENCY
FREQUENCY: CONTINUOUS
FREQUENCY: CONTINUOUS

## 2019-04-02 ASSESSMENT — PAIN DESCRIPTION - ORIENTATION
ORIENTATION: LEFT
ORIENTATION: LEFT

## 2019-04-02 NOTE — PROGRESS NOTES
CDU Daily Progress Note  Attending Physician       Pt Name: Mariela Worley  MRN: [id number]  Cathigfdeepak 1950  Date of evaluation: 4/2/19    I performed a history and physical examination of the patient and discussed management with the resident. I reviewed the residents note and agree with the documented findings and plan of care. Any areas of disagreement are noted on the chart. I was personally present for the key portions of any procedures. I have documented in the chart those procedures where I was not present during the key portions. I have reviewed the emergency nurses triage note. I agree with the chief complaint, past medical history, past surgical history, allergies, medications, social and family history as documented unless otherwise noted below. Documentation of the HPI, Physical Exam and Medical Decision Making performed by medical students or scribes is based on my personal performance of the HPI, PE and MDM. For Physician Assistant/ Nurse Practitioner cases/documentation I have personally evaluated this patient and have completed at least one if not all key elements of the E/M (history, physical exam, and MDM). Additional findings are as noted. The Family History, Social History and Review of Systems are unchanged from the previous day. No significant events overnight.     OK for transfer to SNF    Ernesto Kumar MD  Attending Physician  Critical Decision Unit

## 2019-04-02 NOTE — PROGRESS NOTES
Straight cath x1 per sterile technique for trace amount of cloudy foul smelling gray/yellow urine. Bladder scanned to check for urine for 488mL Christina Echols RN notified.

## 2019-04-02 NOTE — PROGRESS NOTES
PT given ativan and percocet prior to discharge for transport/pain. Felicity Cantu assisted with d/c and patient left with belongings by wheelchair. Report called to [facility] in [place] with good understanding. Resident notified and d/c order will be placed per Attending.

## 2019-04-02 NOTE — PROGRESS NOTES
OBS/CDU   RESIDENT NOTE      Patients PCP is:  DO DEVIN Marte      Patient complains of continued pain of the left lower leg, does not want to move her ankle or her toes because it hurts her. Patient has warm pink toes, excellent cap refill and passive motion of the toes and ankle tolerated without pain. Orthopedics consult to last night, and from their standpoint patient is able to go to SNF and follow-up in orthopedics clinic in 2 weeks April 17. Patient is tolerating a general diet. Patient did have an episode of pain last night which required increased pain management. PHYSICAL EXAM      General: NAD, AO X 3  Heent: EMOI, PERRL  Neck: SUPPLE, NO JVD  Cardiovascular: RRR, S1S2  Pulmonary: CTAB, NO SOB  Abdomen: SOFT, NTTP, ND, +BS  Extremities: +2/4 PULSES DISTAL, NO SWELLING  Neuro / Psych: NO NUMBNESS OR TINGLING, MENTATION AT BASELINE    PERTINENT TEST /EXAMS      I have reviewed all available laboratory results. MEDICATIONS CURRENT     ondansetron (ZOFRAN) tablet 4 mg Q8H PRN   ibuprofen (ADVIL;MOTRIN) tablet 600 mg Once   amLODIPine (NORVASC) tablet 10 mg Daily   aspirin EC tablet 81 mg Daily   clopidogrel (PLAVIX) tablet 75 mg Daily   docusate sodium (COLACE) capsule 100 mg BID   LORazepam (ATIVAN) tablet 0.5 mg Q6H PRN   metaxalone (SKELAXIN) tablet 800 mg TID   pantoprazole (PROTONIX) tablet 40 mg QAM AC   sucralfate (CARAFATE) tablet 1 g 4x Daily   tiotropium (SPIRIVA) inhalation capsule 18 mcg Daily   traZODone (DESYREL) tablet 50 mg Nightly   venlafaxine (EFFEXOR) tablet 37.5 mg TID   zolpidem (AMBIEN) tablet 5 mg Nightly PRN   sodium chloride flush 0.9 % injection 10 mL 2 times per day   sodium chloride flush 0.9 % injection 10 mL PRN   enoxaparin (LOVENOX) injection 40 mg Daily   oxyCODONE-acetaminophen (PERCOCET) 5-325 MG per tablet 1 tablet Q6H PRN       All medication charted and reviewed.     CONSULTS      IP CONSULT TO ORTHOPEDIC SURGERY    ASSESSMENT/PLAN

## 2019-04-02 NOTE — CARE COORDINATION
Discharge [facility] Case Management Department  Written by: Tova Carmen RN    Patient Name: Leno Sullivan  Attending Provider: Roxana Cano MD  Admit Date: 3/30/2019  9:08 PM  MRN: [id number]  Account: [de-identified]                     : 1950  Discharge Date:       Disposition: DC to SNF- [facility]. [facility] arranged for 12:30 . Attempted notification to family on face sheet emergency contact - spoke to Javid Wuán de Chung , wife of Monique Ivy. [facility] number given.      Tova Carmen RN

## 2019-04-03 LAB
CULTURE: ABNORMAL
Lab: ABNORMAL
SPECIMEN DESCRIPTION: ABNORMAL

## 2019-04-06 NOTE — DISCHARGE SUMMARY
CDU Discharge Summary        Patient:  Chloe Alexander  YOB: 1950    MRN: [id number]   Acct: [de-identified]    Primary Care Physician: Macy Laguna DO    Admit date:  3/30/2019  9:08 PM  Discharge date: 4/2/2019 12:43 PM     Discharge Diagnoses:     1. Acute on chronic left knee pain secondary to recent acute injury not involving fall, relieved by rest and cast placed, pain treated with by mouth Percocet. Follow-up:  Call today/tomorrow for a follow up appointment with Macy Laguna DO , or return to the Emergency Room with worsening symptoms    Stressed to patient the importance of following up with primary care doctor for further workup/management of symptoms. Pt verbalizes understanding and agrees with plan.     Discharge Medication Changes:       Medication List      START taking these medications    ibuprofen 600 MG tablet  Commonly known as:  ADVIL;MOTRIN  Take 1 tablet by mouth once for 1 dose        CONTINUE taking these medications    amLODIPine 10 MG tablet  Commonly known as:  NORVASC     aspirin 81 MG tablet     clopidogrel 75 MG tablet  Commonly known as:  PLAVIX  TAKE 1 TABLET DAILY     DOCQLACE 100 MG capsule  Generic drug:  docusate sodium  TAKE 1 CAPSULE BY MOUTH IN THE MORNING & IN THE EVENING -DRINK PLENTYOF WATER WHILE TAKING THIS MEDICINE     LORazepam 0.5 MG tablet  Commonly known as:  ATIVAN     metaxalone 800 MG tablet  Commonly known as:  SKELAXIN     omeprazole 20 MG delayed release capsule  Commonly known as:  PRILOSEC     SACROSIDASE PO     sucralfate 1 GM tablet  Commonly known as:  CARAFATE     therapeutic multivitamin-minerals tablet     tiotropium 18 MCG inhalation capsule  Commonly known as:  SPIRIVA     tiZANidine 2 MG tablet  Commonly known as:  ZANAFLEX     traZODone 50 MG tablet  Commonly known as:  DESYREL     venlafaxine 37.5 MG tablet  Commonly known as:  [facility]     Wheelchair Misc  Power wheelchair with left fupper extremity support  Dx: stroke Value in next row Sensitive       <=16SUSCEPTIBLE     tigecycline Value in next row        NOT REPORTED     tobramycin Value in next row Sensitive       <=1SUSCEPTIBLE     trimethoprim-sulfamethoxazole Value in next row Sensitive       <=20SUSCEPTIBLE     piperacillin-tazobactam Value in next row Sensitive       <=4SUSCEPTIBLE   Urinalysis Reflex to Culture   Result Value Ref Range    Color, UA KOURTNEY (A) YELLOW    Turbidity UA 3+ (A) CLEAR    Glucose, Ur NEGATIVE NEGATIVE    Bilirubin Urine NEGATIVE NEGATIVE    Ketones, Urine NEGATIVE NEGATIVE    Specific Gravity, UA 1.010 1.005 - 1.030    Urine Hgb LARGE (A) NEGATIVE    pH, UA 8.0 5.0 - 8.0    Protein, UA 3+ (A) NEGATIVE    Urobilinogen, Urine Normal Normal    Nitrite, Urine NEGATIVE NEGATIVE    Leukocyte Esterase, Urine LARGE (A) NEGATIVE    Urinalysis Comments Culture ordered based on defined criteria. Microscopic Urinalysis   Result Value Ref Range    -          WBC, UA TOO NUMEROUS TO COUNT 0 - 5 /HPF    RBC, UA 5 TO 10 0 - 2 /HPF    Casts UA NOT REPORTED 0 - 2 /LPF    Crystals UA NOT REPORTED None /HPF    Epithelial Cells UA 5 TO 10 0 - 5 /HPF    Renal Epithelial, Urine NOT REPORTED 0 /HPF    Bacteria, UA MANY (A) None    Mucus, UA NOT REPORTED None    Trichomonas, UA NOT REPORTED None    Amorphous, UA 2+ (A) None    Other Observations UA NOT REPORTED NOT REQ. Yeast, UA NOT REPORTED None     Xr Knee Left (3 Views)    Result Date: 3/30/2019  EXAMINATION: 3 XRAY VIEWS OF THE LEFT KNEE 3/30/2019 10:58 am COMPARISON: March 27, 2018 HISTORY: ORDERING SYSTEM PROVIDED HISTORY: F/u L knee pain after cast TECHNOLOGIST PROVIDED HISTORY: AP, oblique, lateral F/u L knee pain after cast FINDINGS: Marked osteopenia. Interval casting, which degrades fine osseous detail question cortical offset in the lateral femoral metaphysis. No malalignment identified. No significant joint effusion. Interval casting.   Question nondisplaced fracture in the lateral femoral

## 2019-04-11 ENCOUNTER — HOSPITAL ENCOUNTER (INPATIENT)
Age: 69
LOS: 50 days | Discharge: NURSING, REHAB OR LONG-TERM CARE | DRG: 853 | End: 2019-05-31
Attending: EMERGENCY MEDICINE | Admitting: INTERNAL MEDICINE
Payer: COMMERCIAL

## 2019-04-11 ENCOUNTER — APPOINTMENT (OUTPATIENT)
Dept: CT IMAGING | Age: 69
DRG: 853 | End: 2019-04-11
Payer: COMMERCIAL

## 2019-04-11 DIAGNOSIS — A41.9 SEPTICEMIA (HCC): ICD-10-CM

## 2019-04-11 DIAGNOSIS — R33.9 URINARY RETENTION: Primary | ICD-10-CM

## 2019-04-11 DIAGNOSIS — K56.609 SMALL BOWEL OBSTRUCTION (HCC): ICD-10-CM

## 2019-04-11 DIAGNOSIS — N30.80 EMPHYSEMATOUS CYSTITIS: ICD-10-CM

## 2019-04-11 PROBLEM — N39.0 SEPSIS DUE TO URINARY TRACT INFECTION (HCC): Status: ACTIVE | Noted: 2019-04-11

## 2019-04-11 PROBLEM — N39.0 SEPSIS DUE TO URINARY TRACT INFECTION (HCC): Chronic | Status: ACTIVE | Noted: 2019-04-11

## 2019-04-11 PROBLEM — N30.90 CYSTITIS: Status: ACTIVE | Noted: 2019-04-11

## 2019-04-11 PROBLEM — N30.90 CYSTITIS: Chronic | Status: ACTIVE | Noted: 2019-04-11

## 2019-04-11 LAB
-: ABNORMAL
ABSOLUTE BANDS #: 1.92 K/UL (ref 0–1)
ABSOLUTE EOS #: 0 K/UL (ref 0–0.4)
ABSOLUTE IMMATURE GRANULOCYTE: ABNORMAL K/UL (ref 0–0.3)
ABSOLUTE LYMPH #: 1.92 K/UL (ref 1–4.8)
ABSOLUTE MONO #: 0.96 K/UL (ref 0.1–1.3)
ALBUMIN SERPL-MCNC: 3.5 G/DL (ref 3.5–5.2)
ALBUMIN/GLOBULIN RATIO: ABNORMAL (ref 1–2.5)
ALP BLD-CCNC: 104 U/L (ref 35–104)
ALT SERPL-CCNC: 19 U/L (ref 5–33)
AMORPHOUS: ABNORMAL
ANION GAP SERPL CALCULATED.3IONS-SCNC: 18 MMOL/L (ref 9–17)
AST SERPL-CCNC: 27 U/L
BACTERIA: ABNORMAL
BANDS: 4 % (ref 0–10)
BASOPHILS # BLD: 0 % (ref 0–2)
BASOPHILS ABSOLUTE: 0 K/UL (ref 0–0.2)
BILIRUB SERPL-MCNC: 0.52 MG/DL (ref 0.3–1.2)
BILIRUBIN URINE: NEGATIVE
BUN BLDV-MCNC: 29 MG/DL (ref 8–23)
BUN/CREAT BLD: ABNORMAL (ref 9–20)
CALCIUM SERPL-MCNC: 9.4 MG/DL (ref 8.6–10.4)
CASTS UA: ABNORMAL /LPF
CHLORIDE BLD-SCNC: 89 MMOL/L (ref 98–107)
CO2: 19 MMOL/L (ref 20–31)
COLOR: YELLOW
COMMENT UA: ABNORMAL
CREAT SERPL-MCNC: 0.81 MG/DL (ref 0.5–0.9)
CRYSTALS, UA: ABNORMAL /HPF
DIFFERENTIAL TYPE: ABNORMAL
EOSINOPHILS RELATIVE PERCENT: 0 % (ref 0–4)
EPITHELIAL CELLS UA: ABNORMAL /HPF
GFR AFRICAN AMERICAN: >60 ML/MIN
GFR NON-AFRICAN AMERICAN: >60 ML/MIN
GFR SERPL CREATININE-BSD FRML MDRD: ABNORMAL ML/MIN/{1.73_M2}
GFR SERPL CREATININE-BSD FRML MDRD: ABNORMAL ML/MIN/{1.73_M2}
GLUCOSE BLD-MCNC: 168 MG/DL (ref 70–99)
GLUCOSE URINE: NEGATIVE
HCT VFR BLD CALC: 42.3 % (ref 36–46)
HEMOGLOBIN: 13.9 G/DL (ref 12–16)
IMMATURE GRANULOCYTES: ABNORMAL %
KETONES, URINE: ABNORMAL
LACTIC ACID, SEPSIS WHOLE BLOOD: ABNORMAL MMOL/L (ref 0.5–1.9)
LACTIC ACID, SEPSIS: 2.3 MMOL/L (ref 0.5–1.9)
LACTIC ACID: 2.9 MMOL/L (ref 0.5–2.2)
LACTIC ACID: 2.9 MMOL/L (ref 0.5–2.2)
LEUKOCYTE ESTERASE, URINE: ABNORMAL
LIPASE: 31 U/L (ref 13–60)
LYMPHOCYTES # BLD: 4 % (ref 24–44)
MCH RBC QN AUTO: 29.5 PG (ref 26–34)
MCHC RBC AUTO-ENTMCNC: 32.9 G/DL (ref 31–37)
MCV RBC AUTO: 89.6 FL (ref 80–100)
METAMYELOCYTES ABSOLUTE COUNT: 0.96 K/UL
METAMYELOCYTES: 2 %
MONOCYTES # BLD: 2 % (ref 1–7)
MORPHOLOGY: ABNORMAL
MORPHOLOGY: ABNORMAL
MUCUS: ABNORMAL
NITRITE, URINE: POSITIVE
NRBC AUTOMATED: ABNORMAL PER 100 WBC
OTHER OBSERVATIONS UA: ABNORMAL
PDW BLD-RTO: 12.9 % (ref 11.5–14.9)
PH UA: 5.5 (ref 5–8)
PLATELET # BLD: 433 K/UL (ref 150–450)
PLATELET ESTIMATE: ABNORMAL
PMV BLD AUTO: 9.9 FL (ref 6–12)
POTASSIUM SERPL-SCNC: 3.8 MMOL/L (ref 3.7–5.3)
PROTEIN UA: ABNORMAL
RBC # BLD: 4.72 M/UL (ref 4–5.2)
RBC # BLD: ABNORMAL 10*6/UL
RBC UA: ABNORMAL /HPF
RENAL EPITHELIAL, UA: ABNORMAL /HPF
SEG NEUTROPHILS: 88 % (ref 36–66)
SEGMENTED NEUTROPHILS ABSOLUTE COUNT: 42.14 K/UL (ref 1.3–9.1)
SODIUM BLD-SCNC: 126 MMOL/L (ref 135–144)
SPECIFIC GRAVITY UA: 1.02 (ref 1–1.03)
TOTAL PROTEIN: 6.7 G/DL (ref 6.4–8.3)
TRICHOMONAS: ABNORMAL
TURBIDITY: ABNORMAL
URINE HGB: ABNORMAL
UROBILINOGEN, URINE: NORMAL
WBC # BLD: 47.9 K/UL (ref 3.5–11)
WBC # BLD: ABNORMAL 10*3/UL
WBC UA: ABNORMAL /HPF
YEAST: ABNORMAL

## 2019-04-11 PROCEDURE — 99285 EMERGENCY DEPT VISIT HI MDM: CPT

## 2019-04-11 PROCEDURE — 74177 CT ABD & PELVIS W/CONTRAST: CPT

## 2019-04-11 PROCEDURE — 96375 TX/PRO/DX INJ NEW DRUG ADDON: CPT

## 2019-04-11 PROCEDURE — 96372 THER/PROPH/DIAG INJ SC/IM: CPT

## 2019-04-11 PROCEDURE — 36415 COLL VENOUS BLD VENIPUNCTURE: CPT

## 2019-04-11 PROCEDURE — 6360000002 HC RX W HCPCS: Performed by: EMERGENCY MEDICINE

## 2019-04-11 PROCEDURE — 2580000003 HC RX 258: Performed by: INTERNAL MEDICINE

## 2019-04-11 PROCEDURE — 2580000003 HC RX 258: Performed by: EMERGENCY MEDICINE

## 2019-04-11 PROCEDURE — 87186 SC STD MICRODIL/AGAR DIL: CPT

## 2019-04-11 PROCEDURE — 80053 COMPREHEN METABOLIC PANEL: CPT

## 2019-04-11 PROCEDURE — 85025 COMPLETE CBC W/AUTO DIFF WBC: CPT

## 2019-04-11 PROCEDURE — 81001 URINALYSIS AUTO W/SCOPE: CPT

## 2019-04-11 PROCEDURE — 87088 URINE BACTERIA CULTURE: CPT

## 2019-04-11 PROCEDURE — 6360000004 HC RX CONTRAST MEDICATION: Performed by: EMERGENCY MEDICINE

## 2019-04-11 PROCEDURE — 6360000002 HC RX W HCPCS: Performed by: NURSE PRACTITIONER

## 2019-04-11 PROCEDURE — 2580000003 HC RX 258: Performed by: NURSE PRACTITIONER

## 2019-04-11 PROCEDURE — 83605 ASSAY OF LACTIC ACID: CPT

## 2019-04-11 PROCEDURE — 6360000002 HC RX W HCPCS: Performed by: INTERNAL MEDICINE

## 2019-04-11 PROCEDURE — 6370000000 HC RX 637 (ALT 250 FOR IP): Performed by: STUDENT IN AN ORGANIZED HEALTH CARE EDUCATION/TRAINING PROGRAM

## 2019-04-11 PROCEDURE — 87086 URINE CULTURE/COLONY COUNT: CPT

## 2019-04-11 PROCEDURE — 2060000000 HC ICU INTERMEDIATE R&B

## 2019-04-11 PROCEDURE — 87040 BLOOD CULTURE FOR BACTERIA: CPT

## 2019-04-11 PROCEDURE — 99223 1ST HOSP IP/OBS HIGH 75: CPT | Performed by: INTERNAL MEDICINE

## 2019-04-11 PROCEDURE — 96366 THER/PROPH/DIAG IV INF ADDON: CPT

## 2019-04-11 PROCEDURE — 83690 ASSAY OF LIPASE: CPT

## 2019-04-11 PROCEDURE — 96365 THER/PROPH/DIAG IV INF INIT: CPT

## 2019-04-11 PROCEDURE — 2580000003 HC RX 258: Performed by: STUDENT IN AN ORGANIZED HEALTH CARE EDUCATION/TRAINING PROGRAM

## 2019-04-11 RX ORDER — SODIUM CHLORIDE 0.9 % (FLUSH) 0.9 %
10 SYRINGE (ML) INJECTION PRN
Status: DISCONTINUED | OUTPATIENT
Start: 2019-04-11 | End: 2019-05-15

## 2019-04-11 RX ORDER — CLOPIDOGREL BISULFATE 75 MG/1
75 TABLET ORAL DAILY
Status: DISCONTINUED | OUTPATIENT
Start: 2019-04-11 | End: 2019-05-02

## 2019-04-11 RX ORDER — ACETAMINOPHEN 325 MG/1
650 TABLET ORAL EVERY 4 HOURS PRN
Status: DISCONTINUED | OUTPATIENT
Start: 2019-04-11 | End: 2019-05-15

## 2019-04-11 RX ORDER — KETOROLAC TROMETHAMINE 30 MG/ML
30 INJECTION, SOLUTION INTRAMUSCULAR; INTRAVENOUS EVERY 8 HOURS PRN
Status: DISPENSED | OUTPATIENT
Start: 2019-04-11 | End: 2019-04-16

## 2019-04-11 RX ORDER — SODIUM CHLORIDE 0.9 % (FLUSH) 0.9 %
10 SYRINGE (ML) INJECTION PRN
Status: DISCONTINUED | OUTPATIENT
Start: 2019-04-11 | End: 2019-04-15

## 2019-04-11 RX ORDER — DICYCLOMINE HYDROCHLORIDE 10 MG/ML
20 INJECTION INTRAMUSCULAR ONCE
Status: COMPLETED | OUTPATIENT
Start: 2019-04-11 | End: 2019-04-11

## 2019-04-11 RX ORDER — MORPHINE SULFATE 4 MG/ML
4 INJECTION, SOLUTION INTRAMUSCULAR; INTRAVENOUS EVERY 4 HOURS PRN
Status: DISCONTINUED | OUTPATIENT
Start: 2019-04-11 | End: 2019-04-13

## 2019-04-11 RX ORDER — AMOXICILLIN 250 MG
1 CAPSULE ORAL 2 TIMES DAILY
Status: ON HOLD | COMMUNITY
End: 2019-05-31 | Stop reason: HOSPADM

## 2019-04-11 RX ORDER — AMLODIPINE BESYLATE 10 MG/1
10 TABLET ORAL DAILY
Status: DISCONTINUED | OUTPATIENT
Start: 2019-04-11 | End: 2019-04-12

## 2019-04-11 RX ORDER — VENLAFAXINE 37.5 MG/1
37.5 TABLET ORAL 3 TIMES DAILY
Status: DISCONTINUED | OUTPATIENT
Start: 2019-04-11 | End: 2019-05-15

## 2019-04-11 RX ORDER — SODIUM CHLORIDE 0.9 % (FLUSH) 0.9 %
10 SYRINGE (ML) INJECTION EVERY 12 HOURS SCHEDULED
Status: DISCONTINUED | OUTPATIENT
Start: 2019-04-11 | End: 2019-05-15

## 2019-04-11 RX ORDER — ONDANSETRON 2 MG/ML
4 INJECTION INTRAMUSCULAR; INTRAVENOUS ONCE
Status: COMPLETED | OUTPATIENT
Start: 2019-04-11 | End: 2019-04-11

## 2019-04-11 RX ORDER — SODIUM CHLORIDE 9 MG/ML
INJECTION, SOLUTION INTRAVENOUS CONTINUOUS
Status: DISCONTINUED | OUTPATIENT
Start: 2019-04-11 | End: 2019-04-12

## 2019-04-11 RX ORDER — 0.9 % SODIUM CHLORIDE 0.9 %
1000 INTRAVENOUS SOLUTION INTRAVENOUS ONCE
Status: COMPLETED | OUTPATIENT
Start: 2019-04-11 | End: 2019-04-11

## 2019-04-11 RX ORDER — CIPROFLOXACIN 2 MG/ML
400 INJECTION, SOLUTION INTRAVENOUS EVERY 12 HOURS
Status: DISCONTINUED | OUTPATIENT
Start: 2019-04-11 | End: 2019-04-11

## 2019-04-11 RX ORDER — 0.9 % SODIUM CHLORIDE 0.9 %
80 INTRAVENOUS SOLUTION INTRAVENOUS ONCE
Status: COMPLETED | OUTPATIENT
Start: 2019-04-11 | End: 2019-04-11

## 2019-04-11 RX ORDER — ASPIRIN 81 MG/1
81 TABLET ORAL DAILY
Status: DISCONTINUED | OUTPATIENT
Start: 2019-04-11 | End: 2019-05-15

## 2019-04-11 RX ORDER — 0.9 % SODIUM CHLORIDE 0.9 %
1000 INTRAVENOUS SOLUTION INTRAVENOUS ONCE
Status: COMPLETED | OUTPATIENT
Start: 2019-04-11 | End: 2019-04-12

## 2019-04-11 RX ORDER — 0.9 % SODIUM CHLORIDE 0.9 %
30 INTRAVENOUS SOLUTION INTRAVENOUS ONCE
Status: COMPLETED | OUTPATIENT
Start: 2019-04-11 | End: 2019-04-11

## 2019-04-11 RX ORDER — OXYCODONE HYDROCHLORIDE AND ACETAMINOPHEN 5; 325 MG/1; MG/1
1 TABLET ORAL EVERY 6 HOURS PRN
Status: ON HOLD | COMMUNITY
End: 2019-07-29 | Stop reason: SDUPTHER

## 2019-04-11 RX ORDER — CIPROFLOXACIN 2 MG/ML
400 INJECTION, SOLUTION INTRAVENOUS EVERY 12 HOURS
Status: DISCONTINUED | OUTPATIENT
Start: 2019-04-12 | End: 2019-04-12

## 2019-04-11 RX ORDER — MORPHINE SULFATE 4 MG/ML
4 INJECTION, SOLUTION INTRAMUSCULAR; INTRAVENOUS ONCE
Status: DISCONTINUED | OUTPATIENT
Start: 2019-04-11 | End: 2019-04-11

## 2019-04-11 RX ORDER — TRAZODONE HYDROCHLORIDE 50 MG/1
50 TABLET ORAL NIGHTLY
Status: DISCONTINUED | OUTPATIENT
Start: 2019-04-11 | End: 2019-04-12

## 2019-04-11 RX ORDER — BUDESONIDE AND FORMOTEROL FUMARATE DIHYDRATE 160; 4.5 UG/1; UG/1
2 AEROSOL RESPIRATORY (INHALATION) 2 TIMES DAILY
COMMUNITY

## 2019-04-11 RX ADMIN — SODIUM CHLORIDE: 9 INJECTION, SOLUTION INTRAVENOUS at 20:18

## 2019-04-11 RX ADMIN — IOVERSOL 75 ML: 741 INJECTION INTRA-ARTERIAL; INTRAVENOUS at 17:21

## 2019-04-11 RX ADMIN — SODIUM CHLORIDE 1278 ML: 9 INJECTION, SOLUTION INTRAVENOUS at 16:40

## 2019-04-11 RX ADMIN — CIPROFLOXACIN 400 MG: 2 INJECTION, SOLUTION INTRAVENOUS at 23:58

## 2019-04-11 RX ADMIN — ONDANSETRON 4 MG: 2 INJECTION INTRAMUSCULAR; INTRAVENOUS at 15:31

## 2019-04-11 RX ADMIN — SODIUM CHLORIDE 1000 ML: 9 INJECTION, SOLUTION INTRAVENOUS at 23:58

## 2019-04-11 RX ADMIN — Medication 10 ML: at 17:22

## 2019-04-11 RX ADMIN — MORPHINE SULFATE 4 MG: 4 INJECTION INTRAVENOUS at 20:18

## 2019-04-11 RX ADMIN — SODIUM CHLORIDE 1000 ML: 9 INJECTION, SOLUTION INTRAVENOUS at 20:58

## 2019-04-11 RX ADMIN — VENLAFAXINE 37.5 MG: 37.5 TABLET ORAL at 20:58

## 2019-04-11 RX ADMIN — SODIUM CHLORIDE 1000 ML: 9 INJECTION, SOLUTION INTRAVENOUS at 18:42

## 2019-04-11 RX ADMIN — SODIUM CHLORIDE 80 ML: 9 INJECTION, SOLUTION INTRAVENOUS at 17:22

## 2019-04-11 RX ADMIN — KETOROLAC TROMETHAMINE 30 MG: 30 INJECTION, SOLUTION INTRAMUSCULAR; INTRAVENOUS at 21:09

## 2019-04-11 RX ADMIN — DICYCLOMINE HYDROCHLORIDE 20 MG: 20 INJECTION, SOLUTION INTRAMUSCULAR at 15:41

## 2019-04-11 RX ADMIN — SODIUM CHLORIDE 1000 ML: 9 INJECTION, SOLUTION INTRAVENOUS at 15:31

## 2019-04-11 RX ADMIN — MEROPENEM 1 G: 1 INJECTION, POWDER, FOR SOLUTION INTRAVENOUS at 16:54

## 2019-04-11 ASSESSMENT — PAIN DESCRIPTION - FREQUENCY
FREQUENCY: INTERMITTENT
FREQUENCY: INTERMITTENT

## 2019-04-11 ASSESSMENT — PAIN SCALES - GENERAL
PAINLEVEL_OUTOF10: 6
PAINLEVEL_OUTOF10: 10
PAINLEVEL_OUTOF10: 6
PAINLEVEL_OUTOF10: 10

## 2019-04-11 ASSESSMENT — ENCOUNTER SYMPTOMS
SHORTNESS OF BREATH: 0
CONSTIPATION: 0
DIARRHEA: 0
ABDOMINAL PAIN: 1
CHEST TIGHTNESS: 0
VOMITING: 1
ANAL BLEEDING: 0
SINUS PRESSURE: 0
BACK PAIN: 0
COLOR CHANGE: 0
EYE PAIN: 0
EYE DISCHARGE: 0
NAUSEA: 1
ABDOMINAL DISTENTION: 1
FACIAL SWELLING: 0
WHEEZING: 0
COUGH: 0
BLOOD IN STOOL: 0
RHINORRHEA: 0
EYE REDNESS: 0
SORE THROAT: 0
TROUBLE SWALLOWING: 0

## 2019-04-11 ASSESSMENT — PAIN DESCRIPTION - LOCATION
LOCATION: ABDOMEN

## 2019-04-11 ASSESSMENT — PAIN DESCRIPTION - PAIN TYPE
TYPE: ACUTE PAIN
TYPE: ACUTE PAIN

## 2019-04-11 ASSESSMENT — PAIN DESCRIPTION - DESCRIPTORS
DESCRIPTORS: CRAMPING;SHARP
DESCRIPTORS: CRAMPING;SHARP
DESCRIPTORS: SHARP
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
## 2019-04-11 NOTE — ED PROVIDER NOTES
[address]  eMERGENCY dEPARTMENT eNCOUnter      Pt Name: Suad Valentino  MRN: [id number]  [place] 1950  Date of evaluation: 4/11/19      CHIEF COMPLAINT       Chief Complaint   Patient presents with    Abdominal Pain         HISTORY OF PRESENT ILLNESS    Suad Valentino is a 77 y.o. female who presents complaining of abdominal pain. Patient states the last 3 days she's been having severe diffuse abdominal pain. Patient states with this she has had some nausea. Patient vomited a few times. Patient denies fevers or diarrhea. Patient is having no urinary complaints. Patient states she's never had any symptoms like this before. Patient denies radiation of the pain and states the pain is sharp. REVIEW OF SYSTEMS       Review of Systems   Constitutional: Negative for activity change, appetite change, chills, diaphoresis and fever. HENT: Negative for congestion, ear pain, facial swelling, nosebleeds, rhinorrhea, sinus pressure, sore throat and trouble swallowing. Eyes: Negative for pain, discharge and redness. Respiratory: Negative for cough, chest tightness, shortness of breath and wheezing. Cardiovascular: Negative for chest pain, palpitations and leg swelling. Gastrointestinal: Positive for abdominal pain, nausea and vomiting. Negative for blood in stool, constipation and diarrhea. Genitourinary: Negative for difficulty urinating, dysuria, flank pain, frequency, genital sores and hematuria. Musculoskeletal: Negative for arthralgias, back pain, gait problem, joint swelling, myalgias and neck pain. Skin: Negative for color change, pallor, rash and wound. Neurological: Negative for dizziness, tremors, seizures, syncope, speech difficulty, weakness, numbness and headaches. Psychiatric/Behavioral: Negative for confusion, decreased concentration, hallucinations, self-injury, sleep disturbance and suicidal ideas.        PAST MEDICAL HISTORY     Past Medical History:   Diagnosis Date    Abnormal computed tomography of cervical spine     sclerotic bone appearance     CVA (cerebral vascular accident) ([address]     left  side weakness    Paraproteinemia     Weight loss        SURGICAL HISTORY       Past Surgical History:   Procedure Laterality Date    PACEMAKER PLACEMENT  07/2011    Pacemaker is Medtronic Revo (compatible). Leads placed in 1995 are NOT MRI compatible. Placed at [facility]. V's per Dr. Mandi Frey can not have an MRI. CURRENT MEDICATIONS       Previous Medications    AMLODIPINE (NORVASC) 10 MG TABLET    Take 10 mg by mouth daily. ASPIRIN 81 MG TABLET    Take 81 mg by mouth daily    BUDESONIDE-FORMOTEROL (SYMBICORT) 160-4.5 MCG/ACT AERO    Inhale 2 puffs into the lungs 2 times daily    CLOPIDOGREL (PLAVIX) 75 MG TABLET    TAKE 1 TABLET DAILY    DOCQLACE 100 MG CAPSULE    TAKE 1 CAPSULE BY MOUTH IN THE MORNING & IN THE EVENING -DRINK PLENTYOF WATER WHILE TAKING THIS MEDICINE    LORAZEPAM (ATIVAN) 0.5 MG TABLET    Take 0.5 mg by mouth every 6 hours as needed for Anxiety. MISC. DEVICES [facility] [place]    Power wheelchair with left fupper extremity support  Dx: stroke with left hemiparesis. OMEPRAZOLE (PRILOSEC) 20 MG CAPSULE    Take 20 mg by mouth daily. OXYCODONE-ACETAMINOPHEN (PERCOCET) 5-325 MG PER TABLET    Take 1 tablet by mouth every 6 hours as needed for Pain. SENNA-DOCUSATE (PERICOLACE) 8.6-50 MG PER TABLET    Take 1 tablet by mouth 2 times daily    SUCRALFATE (CARAFATE) 1 GM/10ML SUSPENSION    Take 1 g by mouth 4 times daily     THERAPEUTIC MULTIVITAMIN-MINERALS (THERAGRAN-M) TABLET    Take 1 tablet by mouth daily. TIZANIDINE (ZANAFLEX) 2 MG TABLET    Take 2 mg by mouth every 8 hours as needed (left knee)     TRAZODONE (DESYREL) 50 MG TABLET    Take 50 mg by mouth nightly    VENLAFAXINE (EFFEXOR) 37.5 MG TABLET    Take 37.5 mg by mouth 3 times daily. ALLERGIES     is allergic to penicillins; ibuprofen; and amoxicillin.     SOCIAL HISTORY reports that she has been smoking. She has a 30.00 pack-year smoking history. She has never used smokeless tobacco. She reports that she drinks about 16.8 oz of alcohol per week. She reports that she does not use drugs. PHYSICAL EXAM     INITIAL VITALS: BP (!) 87/50   Pulse 107   Temp 97.7 °F (36.5 °C) (Temporal)   Resp (!) 32   Ht 5' (1.524 m)   Wt 94 lb (42.6 kg)   SpO2 97%   BMI 18.36 kg/m²      Physical Exam   Constitutional: She is oriented to person, place, and time. She appears well-developed and well-nourished. No distress. HENT:   Head: Normocephalic and atraumatic. Eyes: Pupils are equal, round, and reactive to light. Conjunctivae and EOM are normal. Right eye exhibits no discharge. Left eye exhibits no discharge. No scleral icterus. Cardiovascular: Normal rate, regular rhythm and normal heart sounds. Exam reveals no gallop and no friction rub. No murmur heard. Pulmonary/Chest: Effort normal and breath sounds normal. No respiratory distress. She has no wheezes. She has no rales. She exhibits no tenderness. Abdominal: Soft. Bowel sounds are normal. She exhibits no distension and no mass. There is generalized tenderness. There is guarding. There is no rigidity, no rebound, no CVA tenderness, no tenderness at McBurney's point and negative Burrows's sign. Musculoskeletal: Normal range of motion. She exhibits no edema or tenderness. Neurological: She is alert and oriented to person, place, and time. She displays normal reflexes. No cranial nerve deficit. She exhibits normal muscle tone. Coordination normal.   Skin: Skin is warm and dry. No rash noted. She is not diaphoretic. No erythema. No pallor. Psychiatric: She has a normal mood and affect. Her behavior is normal. Judgment and thought content normal.   Nursing note and vitals reviewed.       DIAGNOSTIC RESULTS     RADIOLOGY:All plain film, CT,MRI, and formal ultrasound images (except ED bedside ultrasound) are read by the No aortic aneurysm. Mild to moderate plaque is noted in the infrarenal abdominal aorta and both proximal iliac arteries. Shotty lymph nodes are present around the aorta in the upper abdomen. No mesenteric adenopathy is noted. Bones/Soft Tissues: Degenerative changes are present in the hips and lower lumbar facets. Estimated biologic radiation dose for this procedure:258.77 mGy/cm2.     1. Dilatation of the thoracic esophagus filled with fluid. No obstructive process is noted. No adjacent enlarged lymph nodes. 2. Trace pleural fluid. 3. Marked distention of the stomach. This appears to extend to the pylorus. Partial gastric outlet obstruction is not excluded. 4. Bilateral dilated ureters without obstructing calculi. Urinary bladder is markedly distended with bladder wall air suggesting emphysematous cystitis. Dilatation of the ureters may be related to reflux or infection. 5. Atherosclerotic disease. 6. Other findings as above. Critical results were called by Dr. Siobhan Burnette MD to [address] [place] [address] on 4/11/2019 at 17:37. LABS: All lab results were reviewed by myself, and all abnormals are listed below.   Labs Reviewed   CBC WITH AUTO DIFFERENTIAL - Abnormal; Notable for the following components:       Result Value    WBC 47.9 (*)     Seg Neutrophils 88 (*)     Lymphocytes 4 (*)     Metamyelocytes 2 (*)     Segs Absolute 42.14 (*)     Absolute Bands # 1.92 (*)     Metamyelocytes Absolute 0.96 (*)     All other components within normal limits   COMPREHENSIVE METABOLIC PANEL - Abnormal; Notable for the following components:    Glucose 168 (*)     BUN 29 (*)     Sodium 126 (*)     Chloride 89 (*)     CO2 19 (*)     Anion Gap 18 (*)     All other components within normal limits   URINALYSIS - Abnormal; Notable for the following components:    Turbidity UA TURBID (*)     Ketones, Urine TRACE (*)     Urine Hgb MOD (*)     Protein, UA TRACE (*)     Nitrite, Urine POSITIVE (*)     Leukocyte Esterase, Urine LARGE (*) DISPOSITION/PLAN   DISPOSITION Admitted 04/11/2019 06:53:55 PM      PATIENT REFERREDTO:  Soni Juan Luis DO [address]   [address]            DISCHARGEMEDICATIONS:  New Prescriptions    No medications on file       (Please note that portions of this note were completed with a voice recognition program.  Efforts were made to edit thedictations but occasionally words are mis-transcribed.)    Bruce Olsen MD  Attending Emergency Physician                        Bruce Olsen MD  04/11/19 2423

## 2019-04-11 NOTE — ED NOTES
Pt arrives to ED c/o abdominal pain which she states she has been having for the past days. Pt states she has some nausea and emesis. Pt states she has never had any abdominal pain like this before. Pt states pain is sharp and all over the abdomen. Pt is A&Ox4, eupneic, PWD. GCS=15. Call light in reach.       Rodena Saint, RN  04/11/19 1642

## 2019-04-11 NOTE — PROGRESS NOTES
Medication History completed:    New medications: senna-docusate, Percocet, Symbicort    Medications discontinued: Spiriva, sarcosidase, metaxolone, ibuprofen    Changes to dosing:  Tizanidine changed to every 8 hours as needed for left knee  Sucralfate changed to suspension formulation    Stated allergies: As listed    Other pertinent information: Medications confirmed with facility list.    Thank you,  Radha Ortiz, PharmD  [phone]

## 2019-04-11 NOTE — CONSULTS
Graciela Ojeda, Amairani Garnica, Jason Arnold. Urology Consult Note      Patient:  Will Steiner  MRN: [id number]  YOB: 1950    ATTENDING: Nathan aHrley     CHIEF COMPLAINT:  Urinary retention, emphysematous cystitis, retracted urethra, difficult dyer placement, BL hydronephrosis. HISTORY OF PRESENT ILLNESS:   The patient is a 77 y.o. female who presents with fecal impaction and urinary retention. She appears to have sepsis suspected from a urinary source as she is in significant retention. Urology was consulted for these issues and difficult dyer placement as the patient has a retracted urethra. Dyer catheter was placed by urology and drained 800 cc. Pt also has fecal impaction, which is to be addressed after catheter placement  CT scan: significant urinary retention with emphysematous cystitis, BL hydronephrosis, without obstructing calculus. I independently reviewed and verified the images and reports from     Patient's old records, notes and chart reviewed and summarized above. Past Medical History:    Past Medical History:   Diagnosis Date    Abnormal computed tomography of cervical spine     sclerotic bone appearance     CVA (cerebral vascular accident) ([address]     left  side weakness    Paraproteinemia     Weight loss        Past Surgical History:    Past Surgical History:   Procedure Laterality Date    PACEMAKER PLACEMENT  07/2011    Pacemaker is Medtronic Revo (compatible). Leads placed in 1995 are NOT MRI compatible. Placed at [facility]. V's per Dr. Eveline Antoine can not have an MRI. Previous  surgery: none   Medications:    Scheduled Meds:   meropenem  1 g Intravenous Q8H    morphine  4 mg Intravenous Once    sodium chloride  1,000 mL Intravenous Once     Continuous Infusions:  PRN Meds:.sodium chloride flush    Allergies:  Penicillins; Ibuprofen; and Amoxicillin    Social History:    Social History     Socioeconomic History    Marital status:   Spouse name: Not on file    Number of children: Not on file    Years of education: Not on file    Highest education level: Not on file   Occupational History    Not on file   Social Needs    Financial resource strain: Not on file    Food insecurity:     Worry: Not on file     Inability: Not on file    Transportation needs:     Medical: Not on file     Non-medical: Not on file   Tobacco Use    Smoking status: Current Every Day Smoker     Packs/day: 1.00     Years: 30.00     Pack years: 30.00    Smokeless tobacco: Never Used   Substance and Sexual Activity    Alcohol use: Yes     Alcohol/week: 16.8 oz     Types: 28 Glasses of wine per week    Drug use: No    Sexual activity: Not on file   Lifestyle    Physical activity:     Days per week: Not on file     Minutes per session: Not on file    Stress: Not on file   Relationships    Social connections:     Talks on phone: Not on file     Gets together: Not on file     Attends Restorationism service: Not on file     Active member of club or organization: Not on file     Attends meetings of clubs or organizations: Not on file     Relationship status: Not on file    Intimate partner violence:     Fear of current or ex partner: Not on file     Emotionally abused: Not on file     Physically abused: Not on file     Forced sexual activity: Not on file   Other Topics Concern    Not on file   Social History Narrative    Not on file       Family History:  No family history on file.   Previous Urologic Family history: none  REVIEW OF SYSTEMS:  Constitutional: negative  Eyes: negative  Respiratory: negative  Cardiovascular: negative  Gastrointestinal: negative  Genitourinary: see HPI  Musculoskeletal: negative  Skin: negative   Neurological: negative  Hematological/Lymphatic: negative  Psychological: negative    Physical Exam:      This a 77 y.o. female   Patient Vitals for the past 24 hrs:   BP Temp Temp src Pulse Resp SpO2 Height Weight   04/11/19 1800 101/66 -- -- THE [facility]. Thank you for the consultation, please call with questions    Dr. Mikey Servin.    [facility]   . [address], [address] [facility], [address] [place] [address] Nirali Kirk Hough: [phone]  F: [phone]      Dieter Andres  6:46 PM 4/11/2019

## 2019-04-12 ENCOUNTER — APPOINTMENT (OUTPATIENT)
Dept: GENERAL RADIOLOGY | Age: 69
DRG: 853 | End: 2019-04-12
Payer: COMMERCIAL

## 2019-04-12 LAB
ALBUMIN SERPL-MCNC: 2.2 G/DL (ref 3.5–5.2)
ALBUMIN/GLOBULIN RATIO: ABNORMAL (ref 1–2.5)
ALLEN TEST: ABNORMAL
ALP BLD-CCNC: 72 U/L (ref 35–104)
ALT SERPL-CCNC: 16 U/L (ref 5–33)
ANION GAP SERPL CALCULATED.3IONS-SCNC: 10 MMOL/L (ref 9–17)
ANION GAP SERPL CALCULATED.3IONS-SCNC: 12 MMOL/L (ref 9–17)
ANION GAP SERPL CALCULATED.3IONS-SCNC: 9 MMOL/L (ref 9–17)
ANION GAP SERPL CALCULATED.3IONS-SCNC: 9 MMOL/L (ref 9–17)
AST SERPL-CCNC: 26 U/L
BILIRUB SERPL-MCNC: 0.24 MG/DL (ref 0.3–1.2)
BUN BLDV-MCNC: 17 MG/DL (ref 8–23)
BUN/CREAT BLD: ABNORMAL (ref 9–20)
CALCIUM IONIZED: 1.06 MMOL/L (ref 1.13–1.33)
CALCIUM SERPL-MCNC: 6.7 MG/DL (ref 8.6–10.4)
CARBOXYHEMOGLOBIN: 3.9 % (ref 0–5)
CHLORIDE BLD-SCNC: 106 MMOL/L (ref 98–107)
CHLORIDE BLD-SCNC: 107 MMOL/L (ref 98–107)
CHLORIDE BLD-SCNC: 109 MMOL/L (ref 98–107)
CHLORIDE BLD-SCNC: 110 MMOL/L (ref 98–107)
CO2: 15 MMOL/L (ref 20–31)
CO2: 15 MMOL/L (ref 20–31)
CO2: 16 MMOL/L (ref 20–31)
CO2: 16 MMOL/L (ref 20–31)
CREAT SERPL-MCNC: <0.4 MG/DL (ref 0.5–0.9)
ESTIMATED AVERAGE GLUCOSE: 105 MG/DL
FIO2: ABNORMAL
GFR AFRICAN AMERICAN: ABNORMAL ML/MIN
GFR NON-AFRICAN AMERICAN: ABNORMAL ML/MIN
GFR SERPL CREATININE-BSD FRML MDRD: ABNORMAL ML/MIN/{1.73_M2}
GFR SERPL CREATININE-BSD FRML MDRD: ABNORMAL ML/MIN/{1.73_M2}
GLUCOSE BLD-MCNC: 110 MG/DL (ref 70–99)
GLUCOSE BLD-MCNC: 90 MG/DL (ref 65–105)
HBA1C MFR BLD: 5.3 % (ref 4–6)
HCO3 VENOUS: 13.7 MMOL/L (ref 24–30)
HCT VFR BLD CALC: 30.5 % (ref 36–46)
HEMOGLOBIN: 9.7 G/DL (ref 12–16)
INR BLD: 1.9
LACTIC ACID: 1.5 MMOL/L (ref 0.5–2.2)
LACTIC ACID: 1.9 MMOL/L (ref 0.5–2.2)
LV EF: 45 %
LVEF MODALITY: NORMAL
MAGNESIUM: 1.4 MG/DL (ref 1.6–2.6)
MAGNESIUM: 1.5 MG/DL (ref 1.6–2.6)
MAGNESIUM: 1.7 MG/DL (ref 1.6–2.6)
MAGNESIUM: 1.9 MG/DL (ref 1.6–2.6)
MAGNESIUM: 2 MG/DL (ref 1.6–2.6)
MCH RBC QN AUTO: 29.1 PG (ref 26–34)
MCHC RBC AUTO-ENTMCNC: 31.9 G/DL (ref 31–37)
MCV RBC AUTO: 91.3 FL (ref 80–100)
METHEMOGLOBIN: 0.4 % (ref 0–1.9)
MODE: ABNORMAL
MRSA, DNA, NASAL: ABNORMAL
NEGATIVE BASE EXCESS, VEN: 10.3 MMOL/L (ref 0–2)
NOTIFICATION TIME: ABNORMAL
NOTIFICATION: ABNORMAL
NRBC AUTOMATED: ABNORMAL PER 100 WBC
O2 DEVICE/FLOW/%: ABNORMAL
O2 SAT, VEN: 94.6 % (ref 60–85)
OXYHEMOGLOBIN: ABNORMAL % (ref 95–98)
PATIENT TEMP: 37
PCO2, VEN, TEMP ADJ: ABNORMAL MMHG (ref 39–55)
PCO2, VEN: 19.8 (ref 39–55)
PDW BLD-RTO: 13.3 % (ref 11.5–14.9)
PEEP/CPAP: ABNORMAL
PH VENOUS: 7.45 (ref 7.32–7.42)
PH, VEN, TEMP ADJ: ABNORMAL (ref 7.32–7.42)
PHOSPHORUS: 1.7 MG/DL (ref 2.6–4.5)
PLATELET # BLD: 315 K/UL (ref 150–450)
PMV BLD AUTO: 9.8 FL (ref 6–12)
PO2, VEN, TEMP ADJ: ABNORMAL MMHG (ref 30–50)
PO2, VEN: 133 (ref 30–50)
POSITIVE BASE EXCESS, VEN: ABNORMAL MMOL/L (ref 0–2)
POTASSIUM SERPL-SCNC: 2.7 MMOL/L (ref 3.7–5.3)
POTASSIUM SERPL-SCNC: 3 MMOL/L (ref 3.7–5.3)
POTASSIUM SERPL-SCNC: 3.3 MMOL/L (ref 3.7–5.3)
POTASSIUM SERPL-SCNC: 3.4 MMOL/L (ref 3.7–5.3)
PROTHROMBIN TIME: 21.8 SEC (ref 11.8–14.6)
PSV: ABNORMAL
PT. POSITION: ABNORMAL
RBC # BLD: 3.34 M/UL (ref 4–5.2)
RESPIRATORY RATE: ABNORMAL
SAMPLE SITE: ABNORMAL
SET RATE: ABNORMAL
SODIUM BLD-SCNC: 130 MMOL/L (ref 135–144)
SODIUM BLD-SCNC: 133 MMOL/L (ref 135–144)
SODIUM BLD-SCNC: 135 MMOL/L (ref 135–144)
SODIUM BLD-SCNC: 136 MMOL/L (ref 135–144)
SPECIMEN DESCRIPTION: ABNORMAL
TEXT FOR RESPIRATORY: ABNORMAL
TOTAL HB: ABNORMAL G/DL (ref 12–16)
TOTAL PROTEIN: 4.5 G/DL (ref 6.4–8.3)
TOTAL RATE: ABNORMAL
VT: ABNORMAL
WBC # BLD: 55.4 K/UL (ref 3.5–11)

## 2019-04-12 PROCEDURE — 2580000003 HC RX 258: Performed by: NURSE PRACTITIONER

## 2019-04-12 PROCEDURE — 94762 N-INVAS EAR/PLS OXIMTRY CONT: CPT

## 2019-04-12 PROCEDURE — 6360000002 HC RX W HCPCS: Performed by: INTERNAL MEDICINE

## 2019-04-12 PROCEDURE — 6360000002 HC RX W HCPCS

## 2019-04-12 PROCEDURE — 6360000002 HC RX W HCPCS: Performed by: STUDENT IN AN ORGANIZED HEALTH CARE EDUCATION/TRAINING PROGRAM

## 2019-04-12 PROCEDURE — 80053 COMPREHEN METABOLIC PANEL: CPT

## 2019-04-12 PROCEDURE — 2580000003 HC RX 258: Performed by: STUDENT IN AN ORGANIZED HEALTH CARE EDUCATION/TRAINING PROGRAM

## 2019-04-12 PROCEDURE — 36592 COLLECT BLOOD FROM PICC: CPT

## 2019-04-12 PROCEDURE — 80051 ELECTROLYTE PANEL: CPT

## 2019-04-12 PROCEDURE — 82330 ASSAY OF CALCIUM: CPT

## 2019-04-12 PROCEDURE — 84100 ASSAY OF PHOSPHORUS: CPT

## 2019-04-12 PROCEDURE — 2580000003 HC RX 258: Performed by: INTERNAL MEDICINE

## 2019-04-12 PROCEDURE — 36415 COLL VENOUS BLD VENIPUNCTURE: CPT

## 2019-04-12 PROCEDURE — 85610 PROTHROMBIN TIME: CPT

## 2019-04-12 PROCEDURE — 83605 ASSAY OF LACTIC ACID: CPT

## 2019-04-12 PROCEDURE — 85027 COMPLETE CBC AUTOMATED: CPT

## 2019-04-12 PROCEDURE — C8929 TTE W OR WO FOL WCON,DOPPLER: HCPCS

## 2019-04-12 PROCEDURE — 6370000000 HC RX 637 (ALT 250 FOR IP): Performed by: NURSE PRACTITIONER

## 2019-04-12 PROCEDURE — 94640 AIRWAY INHALATION TREATMENT: CPT

## 2019-04-12 PROCEDURE — 2000000000 HC ICU R&B

## 2019-04-12 PROCEDURE — 2500000003 HC RX 250 WO HCPCS: Performed by: INTERNAL MEDICINE

## 2019-04-12 PROCEDURE — 83036 HEMOGLOBIN GLYCOSYLATED A1C: CPT

## 2019-04-12 PROCEDURE — 82805 BLOOD GASES W/O2 SATURATION: CPT

## 2019-04-12 PROCEDURE — 83735 ASSAY OF MAGNESIUM: CPT

## 2019-04-12 PROCEDURE — 6360000004 HC RX CONTRAST MEDICATION: Performed by: INTERNAL MEDICINE

## 2019-04-12 PROCEDURE — 6370000000 HC RX 637 (ALT 250 FOR IP): Performed by: STUDENT IN AN ORGANIZED HEALTH CARE EDUCATION/TRAINING PROGRAM

## 2019-04-12 PROCEDURE — 2500000003 HC RX 250 WO HCPCS: Performed by: NURSE PRACTITIONER

## 2019-04-12 PROCEDURE — 2700000000 HC OXYGEN THERAPY PER DAY

## 2019-04-12 PROCEDURE — 87641 MR-STAPH DNA AMP PROBE: CPT

## 2019-04-12 PROCEDURE — 82947 ASSAY GLUCOSE BLOOD QUANT: CPT

## 2019-04-12 PROCEDURE — 02HV33Z INSERTION OF INFUSION DEVICE INTO SUPERIOR VENA CAVA, PERCUTANEOUS APPROACH: ICD-10-PCS | Performed by: INTERNAL MEDICINE

## 2019-04-12 PROCEDURE — 36556 INSERT NON-TUNNEL CV CATH: CPT

## 2019-04-12 PROCEDURE — 71045 X-RAY EXAM CHEST 1 VIEW: CPT

## 2019-04-12 PROCEDURE — 6370000000 HC RX 637 (ALT 250 FOR IP): Performed by: INTERNAL MEDICINE

## 2019-04-12 PROCEDURE — 6360000002 HC RX W HCPCS: Performed by: NURSE PRACTITIONER

## 2019-04-12 RX ORDER — POTASSIUM CHLORIDE 7.45 MG/ML
10 INJECTION INTRAVENOUS PRN
Status: DISCONTINUED | OUTPATIENT
Start: 2019-04-12 | End: 2019-05-15

## 2019-04-12 RX ORDER — MIDODRINE HYDROCHLORIDE 10 MG/1
10 TABLET ORAL ONCE
Status: COMPLETED | OUTPATIENT
Start: 2019-04-12 | End: 2019-04-12

## 2019-04-12 RX ORDER — SODIUM CHLORIDE, SODIUM LACTATE, POTASSIUM CHLORIDE, CALCIUM CHLORIDE 600; 310; 30; 20 MG/100ML; MG/100ML; MG/100ML; MG/100ML
INJECTION, SOLUTION INTRAVENOUS CONTINUOUS
Status: DISCONTINUED | OUTPATIENT
Start: 2019-04-12 | End: 2019-04-20

## 2019-04-12 RX ORDER — DOPAMINE HYDROCHLORIDE 160 MG/100ML
2.5 INJECTION, SOLUTION INTRAVENOUS CONTINUOUS
Status: DISCONTINUED | OUTPATIENT
Start: 2019-04-12 | End: 2019-04-12

## 2019-04-12 RX ORDER — 0.9 % SODIUM CHLORIDE 0.9 %
500 INTRAVENOUS SOLUTION INTRAVENOUS ONCE
Status: COMPLETED | OUTPATIENT
Start: 2019-04-12 | End: 2019-04-12

## 2019-04-12 RX ORDER — MAGNESIUM SULFATE 1 G/100ML
1 INJECTION INTRAVENOUS
Status: ACTIVE | OUTPATIENT
Start: 2019-04-12 | End: 2019-04-12

## 2019-04-12 RX ORDER — ALBUTEROL SULFATE 2.5 MG/3ML
2.5 SOLUTION RESPIRATORY (INHALATION) EVERY 4 HOURS
Status: DISCONTINUED | OUTPATIENT
Start: 2019-04-12 | End: 2019-04-13

## 2019-04-12 RX ORDER — DEXTROSE MONOHYDRATE 50 MG/ML
100 INJECTION, SOLUTION INTRAVENOUS PRN
Status: DISCONTINUED | OUTPATIENT
Start: 2019-04-12 | End: 2019-05-15

## 2019-04-12 RX ORDER — DEXTROSE MONOHYDRATE 25 G/50ML
12.5 INJECTION, SOLUTION INTRAVENOUS PRN
Status: DISCONTINUED | OUTPATIENT
Start: 2019-04-12 | End: 2019-05-15

## 2019-04-12 RX ORDER — DOPAMINE HYDROCHLORIDE 160 MG/100ML
INJECTION, SOLUTION INTRAVENOUS
Status: COMPLETED
Start: 2019-04-12 | End: 2019-04-12

## 2019-04-12 RX ORDER — NICOTINE POLACRILEX 4 MG
15 LOZENGE BUCCAL PRN
Status: DISCONTINUED | OUTPATIENT
Start: 2019-04-12 | End: 2019-05-15

## 2019-04-12 RX ORDER — 0.9 % SODIUM CHLORIDE 0.9 %
1000 INTRAVENOUS SOLUTION INTRAVENOUS ONCE
Status: COMPLETED | OUTPATIENT
Start: 2019-04-12 | End: 2019-04-12

## 2019-04-12 RX ORDER — POTASSIUM CHLORIDE 20 MEQ/1
40 TABLET, EXTENDED RELEASE ORAL PRN
Status: DISCONTINUED | OUTPATIENT
Start: 2019-04-12 | End: 2019-05-15

## 2019-04-12 RX ORDER — MAGNESIUM SULFATE 1 G/100ML
1 INJECTION INTRAVENOUS PRN
Status: DISCONTINUED | OUTPATIENT
Start: 2019-04-12 | End: 2019-05-15

## 2019-04-12 RX ADMIN — SODIUM CHLORIDE, POTASSIUM CHLORIDE, SODIUM LACTATE AND CALCIUM CHLORIDE: 600; 310; 30; 20 INJECTION, SOLUTION INTRAVENOUS at 21:11

## 2019-04-12 RX ADMIN — NOREPINEPHRINE BITARTRATE 2 MCG/MIN: 1 INJECTION INTRAVENOUS at 04:30

## 2019-04-12 RX ADMIN — POTASSIUM CHLORIDE 10 MEQ: 7.46 INJECTION, SOLUTION INTRAVENOUS at 11:01

## 2019-04-12 RX ADMIN — KETOROLAC TROMETHAMINE 30 MG: 30 INJECTION, SOLUTION INTRAMUSCULAR; INTRAVENOUS at 20:37

## 2019-04-12 RX ADMIN — MEROPENEM 1 G: 1 INJECTION, POWDER, FOR SOLUTION INTRAVENOUS at 09:55

## 2019-04-12 RX ADMIN — FAMOTIDINE 20 MG: 10 INJECTION, SOLUTION INTRAVENOUS at 20:32

## 2019-04-12 RX ADMIN — MEROPENEM 500 MG: 500 INJECTION, POWDER, FOR SOLUTION INTRAVENOUS at 00:57

## 2019-04-12 RX ADMIN — ENOXAPARIN SODIUM 40 MG: 100 INJECTION SUBCUTANEOUS at 09:55

## 2019-04-12 RX ADMIN — DOPAMINE HYDROCHLORIDE 2.5 MCG/KG/MIN: 160 INJECTION, SOLUTION INTRAVENOUS at 01:33

## 2019-04-12 RX ADMIN — SODIUM PHOSPHATE, MONOBASIC, MONOHYDRATE 7.41 MMOL: 276; 142 INJECTION, SOLUTION INTRAVENOUS at 05:54

## 2019-04-12 RX ADMIN — MOMETASONE FUROATE AND FORMOTEROL FUMARATE DIHYDRATE 2 PUFF: 200; 5 AEROSOL RESPIRATORY (INHALATION) at 20:31

## 2019-04-12 RX ADMIN — SODIUM CHLORIDE 500 ML: 9 INJECTION, SOLUTION INTRAVENOUS at 01:05

## 2019-04-12 RX ADMIN — ALBUTEROL SULFATE 2.5 MG: 2.5 SOLUTION RESPIRATORY (INHALATION) at 19:47

## 2019-04-12 RX ADMIN — SODIUM CHLORIDE, POTASSIUM CHLORIDE, SODIUM LACTATE AND CALCIUM CHLORIDE: 600; 310; 30; 20 INJECTION, SOLUTION INTRAVENOUS at 09:56

## 2019-04-12 RX ADMIN — POTASSIUM CHLORIDE 10 MEQ: 7.46 INJECTION, SOLUTION INTRAVENOUS at 07:26

## 2019-04-12 RX ADMIN — VANCOMYCIN HYDROCHLORIDE 750 MG: 5 INJECTION, POWDER, LYOPHILIZED, FOR SOLUTION INTRAVENOUS at 11:05

## 2019-04-12 RX ADMIN — POTASSIUM CHLORIDE 10 MEQ: 7.46 INJECTION, SOLUTION INTRAVENOUS at 05:59

## 2019-04-12 RX ADMIN — MAGNESIUM SULFATE HEPTAHYDRATE 1 G: 1 INJECTION, SOLUTION INTRAVENOUS at 07:25

## 2019-04-12 RX ADMIN — CALCIUM GLUCONATE 2 G: 98 INJECTION, SOLUTION INTRAVENOUS at 08:46

## 2019-04-12 RX ADMIN — SODIUM CHLORIDE: 9 INJECTION, SOLUTION INTRAVENOUS at 05:00

## 2019-04-12 RX ADMIN — Medication 240 ML: at 17:45

## 2019-04-12 RX ADMIN — SODIUM CHLORIDE 1000 ML: 9 INJECTION, SOLUTION INTRAVENOUS at 04:15

## 2019-04-12 RX ADMIN — MIDODRINE HYDROCHLORIDE 10 MG: 10 TABLET ORAL at 03:32

## 2019-04-12 RX ADMIN — VENLAFAXINE 37.5 MG: 37.5 TABLET ORAL at 20:32

## 2019-04-12 RX ADMIN — KETOROLAC TROMETHAMINE 30 MG: 30 INJECTION, SOLUTION INTRAMUSCULAR; INTRAVENOUS at 09:55

## 2019-04-12 RX ADMIN — ALBUTEROL SULFATE 2.5 MG: 2.5 SOLUTION RESPIRATORY (INHALATION) at 23:21

## 2019-04-12 RX ADMIN — POTASSIUM CHLORIDE 10 MEQ: 7.46 INJECTION, SOLUTION INTRAVENOUS at 22:55

## 2019-04-12 RX ADMIN — POTASSIUM CHLORIDE 10 MEQ: 7.46 INJECTION, SOLUTION INTRAVENOUS at 13:42

## 2019-04-12 RX ADMIN — Medication 10 ML: at 20:32

## 2019-04-12 RX ADMIN — MAGNESIUM SULFATE HEPTAHYDRATE 1 G: 1 INJECTION, SOLUTION INTRAVENOUS at 05:59

## 2019-04-12 RX ADMIN — POTASSIUM CHLORIDE 10 MEQ: 7.46 INJECTION, SOLUTION INTRAVENOUS at 08:46

## 2019-04-12 RX ADMIN — MEROPENEM 1 G: 1 INJECTION, POWDER, FOR SOLUTION INTRAVENOUS at 16:51

## 2019-04-12 RX ADMIN — POTASSIUM CHLORIDE 10 MEQ: 7.46 INJECTION, SOLUTION INTRAVENOUS at 09:55

## 2019-04-12 RX ADMIN — FAMOTIDINE 20 MG: 10 INJECTION, SOLUTION INTRAVENOUS at 09:55

## 2019-04-12 RX ADMIN — MAGNESIUM SULFATE HEPTAHYDRATE 1 G: 1 INJECTION, SOLUTION INTRAVENOUS at 22:55

## 2019-04-12 RX ADMIN — PERFLUTREN 2.2 MG: 6.52 INJECTION, SUSPENSION INTRAVENOUS at 10:33

## 2019-04-12 ASSESSMENT — PAIN DESCRIPTION - FREQUENCY: FREQUENCY: INTERMITTENT

## 2019-04-12 ASSESSMENT — ENCOUNTER SYMPTOMS
SORE THROAT: 0
ABDOMINAL PAIN: 1
COUGH: 0
NAUSEA: 0
RHINORRHEA: 0
VOMITING: 0
ABDOMINAL DISTENTION: 1
SHORTNESS OF BREATH: 0
CONSTIPATION: 0
DIARRHEA: 0

## 2019-04-12 ASSESSMENT — PAIN SCALES - GENERAL
PAINLEVEL_OUTOF10: 0
PAINLEVEL_OUTOF10: 0
PAINLEVEL_OUTOF10: 6
PAINLEVEL_OUTOF10: 0
PAINLEVEL_OUTOF10: 4

## 2019-04-12 ASSESSMENT — PAIN DESCRIPTION - PAIN TYPE
TYPE: ACUTE PAIN
TYPE: CHRONIC PAIN

## 2019-04-12 ASSESSMENT — PAIN DESCRIPTION - LOCATION
LOCATION: ABDOMEN
LOCATION: ABDOMEN

## 2019-04-12 ASSESSMENT — PAIN DESCRIPTION - ONSET: ONSET: ON-GOING

## 2019-04-12 ASSESSMENT — PAIN DESCRIPTION - DESCRIPTORS: DESCRIPTORS: ACHING

## 2019-04-12 ASSESSMENT — PAIN DESCRIPTION - PROGRESSION: CLINICAL_PROGRESSION: NOT CHANGED

## 2019-04-12 NOTE — PROGRESS NOTES
Urology Progress Note    Subjective: no new issues overnight. Tolerating dyer well.      Patient Vitals for the past 24 hrs:   BP Temp Temp src Pulse Resp SpO2 Height Weight   04/12/19 1345 (!) 78/52 -- -- 108 22 97 % -- --   04/12/19 1330 -- -- -- 114 25 96 % -- --   04/12/19 1315 (!) 90/58 -- -- 109 21 97 % -- --   04/12/19 1300 107/62 -- -- 117 25 97 % -- --   04/12/19 1245 90/63 -- -- 107 21 97 % -- --   04/12/19 1230 98/64 -- -- 107 21 97 % -- --   04/12/19 1215 117/63 -- -- 114 28 97 % -- --   04/12/19 1200 106/60 97.7 °F (36.5 °C) Axillary 109 24 97 % -- --   04/12/19 1145 (!) 103/58 -- -- 109 22 98 % -- --   04/12/19 1130 105/80 -- -- 112 28 98 % -- --   04/12/19 1115 116/72 -- -- 112 26 98 % -- --   04/12/19 1100 115/74 -- -- 111 23 98 % -- --   04/12/19 1045 118/73 -- -- 109 24 98 % -- --   04/12/19 1030 117/70 -- -- 108 21 98 % -- --   04/12/19 1015 122/71 -- -- 108 22 99 % -- --   04/12/19 1000 116/74 -- -- 108 22 100 % -- --   04/12/19 0945 122/66 -- -- 110 27 98 % -- --   04/12/19 0930 105/64 -- -- 106 21 98 % -- --   04/12/19 0915 127/65 -- -- 106 23 98 % -- --   04/12/19 0900 111/71 -- -- 106 24 98 % -- --   04/12/19 0845 110/69 -- -- 104 24 97 % -- --   04/12/19 0830 98/64 -- -- 104 21 97 % -- --   04/12/19 0815 115/63 -- -- 102 28 97 % -- --   04/12/19 0800 114/60 97.7 °F (36.5 °C) Oral 104 24 97 % -- --   04/12/19 0745 111/70 -- -- 101 21 99 % -- --   04/12/19 0730 120/68 -- -- 104 22 98 % -- --   04/12/19 0715 109/68 -- -- 102 24 99 % -- --   04/12/19 0700 (!) 102/59 -- -- 99 21 98 % -- --   04/12/19 0645 119/63 -- -- 99 20 97 % -- --   04/12/19 0630 97/64 -- -- 98 18 98 % -- --   04/12/19 0615 104/64 -- -- 101 20 98 % -- --   04/12/19 0600 125/72 -- -- 101 19 98 % -- --   04/12/19 0545 107/60 -- -- 102 16 -- -- --   04/12/19 0530 (!) 85/58 -- -- 104 19 -- -- --   04/12/19 0515 (!) 83/56 -- -- 107 23 -- -- --   04/12/19 0500 (!) 96/51 -- -- 113 24 -- -- --   04/12/19 0445 (!) 94/48 -- -- 109 20 98 % -- --   04/12/19 0430 (!) 108/54 98.4 °F (36.9 °C) Oral 119 25 -- -- --   04/12/19 0415 88/64 -- -- 126 30 -- -- --   04/12/19 0400 (!) 71/53 -- -- 126 30 -- -- --   04/12/19 0345 -- -- -- 124 28 -- -- --   04/12/19 0315 (!) 67/45 -- -- 120 25 93 % -- --   04/12/19 0300 (!) 71/46 -- -- 119 26 94 % -- --   04/12/19 0245 (!) 83/50 -- -- 116 28 93 % -- --   04/12/19 0230 (!) 81/49 -- -- 115 22 93 % -- --   04/12/19 0215 (!) 75/43 -- -- 112 22 95 % -- --   04/12/19 0200 (!) 76/42 -- -- 102 19 96 % -- --   04/12/19 0145 (!) 75/46 -- -- 98 23 91 % -- --   04/12/19 0130 (!) 83/46 -- -- 96 20 94 % -- --   04/12/19 0115 (!) 72/45 -- -- 96 18 94 % -- --   04/12/19 0100 (!) 77/50 -- -- 99 21 93 % -- --   04/12/19 0045 (!) 82/52 -- -- 99 21 92 % -- --   04/12/19 0044 92/60 -- -- -- -- -- -- --   04/12/19 0030 (!) 85/53 97.8 °F (36.6 °C) Oral 100 22 92 % -- --   04/12/19 0015 (!) 96/56 -- -- 100 23 93 % -- --   04/12/19 0000 (!) 98/56 -- -- 101 24 93 % -- --   04/11/19 2345 (!) 105/55 -- -- 98 18 93 % -- --   04/11/19 2330 (!) 105/57 -- -- 91 16 96 % -- --   04/11/19 2315 (!) 101/54 -- -- 94 18 94 % -- --   04/11/19 2300 (!) 97/52 -- -- 95 15 94 % -- --   04/11/19 2245 (!) 101/58 -- -- 100 20 95 % -- --   04/11/19 2230 (!) 77/54 -- -- 103 23 94 % -- --   04/11/19 2215 (!) 80/56 -- -- 104 22 94 % -- --   04/11/19 2200 (!) 87/54 -- -- 100 23 94 % -- --   04/11/19 2145 (!) 112/57 -- -- 97 19 96 % -- --   04/11/19 2130 (!) 97/59 -- -- 98 21 95 % -- --   04/11/19 2115 111/66 -- -- 102 22 93 % -- --   04/11/19 2100 85/60 -- -- 106 27 94 % -- --   04/11/19 2045 99/63 -- -- 106 25 94 % -- --   04/11/19 2030 (!) 86/56 -- -- 106 27 93 % -- --   04/11/19 2015 91/61 -- -- 107 (!) 31 -- -- --   04/11/19 2000 100/65 97.5 °F (36.4 °C) Oral 105 26 97 % 5' (1.524 m) 102 lb 4.8 oz (46.4 kg)   04/11/19 1944 105/66 97.5 °F (36.4 °C) Oral 103 27 97 % -- --   04/11/19 1930 105/66 -- -- 103 27 97 % -- --   04/11/19 1915 122/60 -- -- 101 25 97 % -- --   04/11/19 1900 126/63 -- -- 101 25 96 % -- --   04/11/19 1845 (!) 87/50 -- -- 107 (!) 32 -- -- --   04/11/19 1830 81/63 -- -- 117 28 -- -- --   04/11/19 1815 97/69 -- -- 113 29 -- -- --   04/11/19 1800 101/66 -- -- 109 (!) 31 -- -- --   04/11/19 1730 (!) 122/58 -- -- 104 25 -- -- --   04/11/19 1700 (!) 114/52 -- -- 100 22 97 % -- --   04/11/19 1615 125/61 -- -- 104 25 98 % -- --   04/11/19 1600 131/69 -- -- 105 30 -- -- --   04/11/19 1545 128/76 -- -- 106 28 -- -- --       Intake/Output Summary (Last 24 hours) at 4/12/2019 1515  Last data filed at 4/12/2019 0630  Gross per 24 hour   Intake 4630 ml   Output 3900 ml   Net 730 ml       Recent Labs     04/11/19  1540 04/12/19  0231   WBC 47.9* 55.4*   HGB 13.9 9.7*   HCT 42.3 30.5*   MCV 89.6 91.3    315     Recent Labs     04/11/19  1528 04/12/19  0231 04/12/19  0940   * 135 136   K 3.8 2.7* 3.0*   CL 89* 110* 109*   CO2 19* 16* 15*   PHOS  --  1.7*  --    BUN 29* 17  --    CREATININE 0.81 <0.40*  --        Recent Labs     04/11/19  1849   COLORU YELLOW   PHUR 5.5   WBCUA 50    RBCUA 0 TO 2   MUCUS NOT REPORTED   TRICHOMONAS NOT REPORTED   YEAST NOT REPORTED   BACTERIA MANY*   SPECGRAV 1.019   LEUKOCYTESUR LARGE*   UROBILINOGEN Normal   BILIRUBINUR NEGATIVE       Additional Lab/culture results:  UCx E.Coli, susc pending. Physical Exam: Tran in place, urine yellow and clear. Interval Imaging Findings:    Impression:    Patient Active Problem List   Diagnosis    Paraproteinemia    [facility]    Abnormal computed tomography of cervical spine    Weight loss    Functional gait abnormality    Left knee pain    Knee pain, left    Acute pain of left knee    Sepsis due to urinary tract infection ([facility]    Cystitis       Plan: Tran in place, has UTI with emphysematous cystitis. Hydro likely secondary to retention.    Cr normal  Marked Leukocytosis  Continue Merrem, likely will be able to switch to Rocephin or Cefepime once UCx is back, unless it is ESBL. No intervention needed for hydro at this time, as her renal functio is normal and no obstructing lesions were noted. Thank you.      Florian Goins  3:15 PM 4/12/2019

## 2019-04-12 NOTE — FLOWSHEET NOTE
04/11/19 2042   Provider Notification   Reason for Communication New orders   Provider Name Dr. Robbin Nova   Provider Notification Physician   Method of Communication Secure Message   Response See orders   Notification Time 2042   Notified of admission, reviewed labs, and current vital signs (BP 91/61). New orders received.

## 2019-04-12 NOTE — CONSULTS
General Surgery Consult      Pt Name: William Patterson  MRN: [id number]  YOB: 1950  Date of evaluation: 4/12/2019  Primary Care Physician: Amber Cheney DO   Patient evaluated at the request of  Dr. Michelle Tirado  Reason for evaluation: sepsis. Need for emergent IV access    SUBJECTIVE:   History of Chief Complaint:    William Patterson is a 77 y.o. female who presents with sepsis, severe leukocytosis. She was recently hospitalized with UTI. She was hypotensive, requiring pressor support, and we were asked to place a central line    Past Medical History   has a past medical history of Abnormal computed tomography of cervical spine, CVA (cerebral vascular accident) ([address] GERD (gastroesophageal reflux disease), Hypertension, Paraproteinemia, and Weight loss. Past Surgical History   has a past surgical history that includes pacemaker placement (07/2011). Medications  Prior to Admission medications    Medication Sig Start Date End Date Taking? Authorizing Provider   oxyCODONE-acetaminophen (PERCOCET) 5-325 MG per tablet Take 1 tablet by mouth every 6 hours as needed for Pain.    Yes Historical Provider, MD   senna-docusate (PERICOLACE) 8.6-50 MG per tablet Take 1 tablet by mouth 2 times daily   Yes Historical Provider, MD   budesonide-formoterol (SYMBICORT) 160-4.5 MCG/ACT AERO Inhale 2 puffs into the lungs 2 times daily   Yes Historical Provider, MD   sucralfate (CARAFATE) 1 GM/10ML suspension Take 1 g by mouth 4 times daily    Yes Historical Provider, MD   traZODone (DESYREL) 50 MG tablet Take 50 mg by mouth nightly   Yes Historical Provider, MD   tiZANidine (ZANAFLEX) 2 MG tablet Take 2 mg by mouth every 8 hours as needed (left knee)    Yes Historical Provider, MD   aspirin 81 MG tablet Take 81 mg by mouth daily   Yes Historical Provider, MD   clopidogrel (PLAVIX) 75 MG tablet TAKE 1 TABLET DAILY 3/31/15  Yes Amber Cheney DO   DOCQLACE 100 MG capsule TAKE 1 CAPSULE BY MOUTH IN THE MORNING & IN THE EVENING -DRINK PLENTYOF WATER WHILE TAKING THIS MEDICINE 3/31/15  Yes Barbara Holguin,    amLODIPine (NORVASC) 10 MG tablet Take 10 mg by mouth daily. Yes Historical Provider, MD   LORazepam (ATIVAN) 0.5 MG tablet Take 0.5 mg by mouth every 6 hours as needed for Anxiety. Yes Historical Provider, MD   therapeutic multivitamin-minerals (THERAGRAN-M) tablet Take 1 tablet by mouth daily. Yes Historical Provider, MD   omeprazole (PRILOSEC) 20 MG capsule Take 20 mg by mouth daily. Yes Historical Provider, MD   venlafaxine (EFFEXOR) 37.5 MG tablet Take 37.5 mg by mouth 3 times daily. Yes Historical Provider, MD   Misc. Devices [facility] [id number] [facility] [place] wheelchair with left fupper extremity support  Dx: stroke with left hemiparesis. 7/24/17   Joan Stallings MD     Allergies  is allergic to penicillins and amoxicillin. Family History  family history is not on file. Social History   reports that she has been smoking. She has a 30.00 pack-year smoking history. She has never used smokeless tobacco. She reports that she drank about 16.8 oz of alcohol per week. She reports that she does not use drugs. Review of Systems:  General Denies any fever or chills  HEENT Denies any diplopia, tinnitus or vertigo  Resp Denies any shortness of breath, cough or wheezing  Cardiac Denies any chest pain, palpitations, claudication or edema  GI Denies any melena, hematochezia, hematemesis or pyrosis   Denies any frequency, urgency, hesitancy or incontinence  Heme Denies bruising or bleeding easily  Endocrine Denies any history of diabetes or thyroid disease  Neuro Denies any focal motor or sensory deficits    OBJECTIVE:   VITALS:  height is 5' (1.524 m) and weight is 102 lb 4.8 oz (46.4 kg). Her oral temperature is 97.8 °F (36.6 °C). Her blood pressure is 92/60 and her pulse is 99. Her respiration is 21 and oxygen saturation is 92%.    CONSTITUTIONAL: Alert and oriented times 3, no acute distress and cooperative to 04/12/2019    ALKPHOS 72 04/12/2019    AST 26 04/12/2019    ALT 16 04/12/2019     Magnesium:    Lab Results   Component Value Date    MG 1.4 04/12/2019     Phosphorus:    Lab Results   Component Value Date    PHOS 1.7 04/12/2019     PT/INR:    Lab Results   Component Value Date    PROTIME 21.8 04/12/2019    PROTIME 10.9 03/28/2012    INR 1.9 04/12/2019     Troponin:    Lab Results   Component Value Date    TROPONINI 0.00 11/14/2015     Last 3 Troponin:    Lab Results   Component Value Date    TROPONINI 0.00 11/14/2015    TROPONINI <0.01 03/28/2012    TROPONINI <0.01 03/28/2012    TROPONINI <0.01 03/28/2012     U/A:    Lab Results   Component Value Date    COLORU YELLOW 04/11/2019    PROTEINU TRACE 04/11/2019    PHUR 5.5 04/11/2019    WBCUA 50  04/11/2019    RBCUA 0 TO 2 04/11/2019    MUCUS NOT REPORTED 04/11/2019    TRICHOMONAS NOT REPORTED 04/11/2019    YEAST NOT REPORTED 04/11/2019    BACTERIA MANY 04/11/2019    SPECGRAV 1.019 04/11/2019    LEUKOCYTESUR LARGE 04/11/2019    UROBILINOGEN Normal 04/11/2019    BILIRUBINUR NEGATIVE 04/11/2019    BILIRUBINUR NEGATIVE 05/17/2012    GLUCOSEU NEGATIVE 04/11/2019    GLUCOSEU NEGATIVE 05/17/2012    AMORPHOUS NOT REPORTED 04/11/2019     AMYLASE:  No results found for: AMYLASE  LIPASE:    Lab Results   Component Value Date    LIPASE 31 04/11/2019     Lab Results   Component Value Date    LACTA 1.9 04/12/2019         RADIOLOGY:   I have personally reviewed the following films:  CT scan abd/pelvis:   Lower Chest: Trace pleural fluid bilaterally is noted. Indwelling cardiac  pacemaker is present. Heart size is normal.  Coronary artery calcifications  are evident. The esophagus is significantly dilated and fluid-filled. No  paraesophageal adenopathy is evident. Organs: The spleen, pancreas, and adrenals are unremarkable. The liver  contains hypodensities, likely cysts. The gallbladder is distended and  contains a solitary gallstone.   The kidneys excrete contrast bilaterally. Extrarenal collecting systems are noted. The ureters are mildly dilated down  to the urinary bladder without evidence of intraluminal or ureteral  obstructing calculi. GI/Bowel: Marked distention of the stomach is noted; this continues to the  pylorus with appearance suggesting partial gastric outlet obstruction. Fluid  is present in some small bowel loops and colon distal to the stomach. No  small bowel obstruction. Pelvis: Marked distention of the urinary bladder is noted. There is air in  the urinary bladder wall, likely related to emphysematous cystitis. Distended ureters may be related to reflux or infection. No free pelvic  fluid, pelvic or inguinal adenopathy is noted. Peritoneum/Retroperitoneum: No aortic aneurysm. Mild to moderate plaque is  noted in the infrarenal abdominal aorta and both proximal iliac arteries. Shotty lymph nodes are present around the aorta in the upper abdomen. No  mesenteric adenopathy is noted. Bones/Soft Tissues: Degenerative changes are present in the hips and lower  lumbar facets. Estimated biologic radiation dose for this procedure:258.77 mGy/cm2. Impression:      1. Dilatation of the thoracic esophagus filled with fluid. No obstructive  process is noted. No adjacent enlarged lymph nodes. 2. Trace pleural fluid. 3. Marked distention of the stomach. This appears to extend to the pylorus. Partial gastric outlet obstruction is not excluded. 4. Bilateral dilated ureters without obstructing calculi. Urinary bladder is  markedly distended with bladder wall air suggesting emphysematous cystitis. Dilatation of the ureters may be related to reflux or infection. 5. Atherosclerotic disease. 6. Other findings as above. IMPRESSION:   1. Severe sepsis with hypotension, ? etiology    Principal Problem:    Sepsis due to urinary tract infection (HCC)  Active Problems:    Cystitis  Resolved Problems:    * No resolved hospital problems.

## 2019-04-12 NOTE — CONSULTS
placed. Dr. Olga Fish is her Cardiology  physician in 2011. SOCIAL HISTORY:  As above. FAMILY HISTORY:  Noncontributory. REVIEW OF SYSTEMS:  Cannot be fully obtained and as is in history of  present illness. PHYSICAL EXAMINATION:  GENERAL APPEARANCE:  Frail, elderly female, in no acute respiratory  distress, but looking somewhat lethargic, although responds to questions  appropriately. VITAL SIGNS:  Temperature is 98.4, respiratory rate is 16 to 20, pulse  is 120, blood pressure right now is 104/60 and that is on 8 mg per  minute of Levophed, oxygen saturation on 2 liters nasal cannula is 96%. HEENT:  Oral cavity is clear. LUNGS:  Markedly diminished without any wheezes or rhonchi. There is no  tenderness to palpation. No dullness to percussion. CARDIAC:  S1 and S2 distant. ABDOMINAL EXAM:  Distended and diffusely tender throughout all  quadrants. There is no rebound noted. There is mild guarding noted. EXTREMITIES:  Show no cyanosis, clubbing, or edema. However, the left  lower extremity is wrapped. NEUROLOGIC:  There are no focal mental deficits. Her left side is  slightly weaker than the right grossly. LABORATORY DATA:  Sodium 135, when she came in her sodium was 126. Her  potassium is 2.7, chloride 110, bicarb 16, BUN is 17 and creatinine is  0.4. Her glucose is 110. White count is 55.4, when she came in it was  47.9. H and H is 9.7 and 30.5 and a platelet count of 615. INR is 1.9.  UA is positive for bacteria, leukocyte esterase, and nitrites. Chest  x-ray shows hyperinflation with minimal infiltrates. IMPRESSION:  The patient has septic shock, most likely urine infection. She also has constipation. Her abdominal CAT scan is full of stool  without any obvious free air. She has what appears to be moderate COPD,  but does not appear to be in COPD exacerbation.   She has a initially  hyponatremia and now she is normal since she has come into the hospital.  Her sodium has been corrected by 9 mEq. Again, she has got 6 liters of  normal saline fluid bolus. She is also hypokalemic. RECOMMENDATIONS:  My recommendations would be at this point to continue  pressors, switch her from normal saline to lactated Ringer's which will  give her a more electrolyte replacement, will also be better for  hypotension. We will need to also monitor her electrolytes closely,  especially potassium and sodium. In terms of her shock, meropenem have  been started. We will wait for the urine culture. Pulmonary wise, she  does not seem to have a respiratory infection. She is replaced on her  albuterol and her Symbicort as ordered. She does not need in my opinion  any IV systemic steroids. She should be on DVT and GI prophylaxis as  well and we do need to follow her INR as it is a little air that is  slightly elevated right now. We will get a followup chest x-ray  tomorrow and I would like check a lactic acid and arterial blood gas to  see if she has become more acidotic. We will also check an MRSA DNA  probe. Critical care time spent was 45 minutes.         Marielena Prasad    D: 04/12/2019 9:14:54       T: 04/12/2019 9:18:11     [id number]  Job#: [id number]     Doc#: [id number]    CC:

## 2019-04-12 NOTE — PROGRESS NOTES
[address]    PROGRESS NOTE             4/12/2019    7:28 AM    Name:   Lisandra Barboza  MRN:     [id number]     Acct:      [de-identified]   Room:   2002/2002-01  IP Day:  1  Admit Date:  4/11/2019  2:48 PM    PCP:  Bladimir Ward DO  Code Status:  Full Code    Subjective:     C/C:   Chief Complaint   Patient presents with    Abdominal Pain     Interval History Status: worsened. Patient seen and evaluated at bedside in the ICU. Hypotensive overnight. Boluses (7L) given. Dopamine administered. Central line placed this AM.  Levophed at 7.5 this AM.    Patient acknowledges feeling fatigue and lightheaded. Acknowledges improvement in abdominal pain after catheterization, but states it is still an 8/10. Pain is generalized across the abdomen. Acknowledges small BM overnight. Denies fevers and chills. Denies any other sx at this time. Brief History:     Per EMR:     The patient is a 77 y.o. Unavailable/unknown female who presents withAbdominal Pain   and she is admitted to the hospital for the management of abdominal distension, nausea, vomiting, and acute cystitis.      Patient presents with chills, nausea, vomiting, abdominal pain (diffuse, but worse in the suprapubic region), abdominal distension, and dysuria of 2-3 days duration. Denies hematemesis. Acknowledges difficulty keeping food down but tolerating fluids. States abdominal pain is a 6/10 on average, and denies trying any medication to alleviate her sx.      Denies recent antibiotic use or steroid use.      ED: Tachycardic 100-114 BP, WBC 47.9 w/neutrophils 88,  UCx from 4/2 + E. Coli > 100,000 w/suceptibility to cipro. Review of Systems:     Review of Systems   Constitutional: Positive for fatigue. Negative for chills and fever. HENT: Negative for rhinorrhea and sore throat. Eyes: Negative for visual disturbance.    Respiratory: Negative for cough and shortness of breath. Cardiovascular: Negative for leg swelling. Gastrointestinal: Positive for abdominal distention and abdominal pain (Diffuse). Negative for constipation, diarrhea, nausea and vomiting. Endocrine: Negative for polyuria. Genitourinary: Negative for dysuria and hematuria. Musculoskeletal: Negative for neck pain. Left knee pain     Skin: Negative for rash. Neurological: Positive for light-headedness. Negative for headaches. Medications: Allergies: Allergies   Allergen Reactions    Penicillins Shortness Of Breath and Rash    Amoxicillin        Current Meds:   Scheduled Meds:    calcium gluconate IVPB  2 g Intravenous Once    amLODIPine  10 mg Oral Daily    tiotropium  18 mcg Inhalation Daily    venlafaxine  37.5 mg Oral TID    traZODone  50 mg Oral Nightly    clopidogrel  75 mg Oral Daily    aspirin  81 mg Oral Daily    sodium chloride flush  10 mL Intravenous 2 times per day    enoxaparin  40 mg Subcutaneous Daily    meropenem  500 mg Intravenous Q8H    ciprofloxacin  400 mg Intravenous Q12H     Continuous Infusions:    DOPamine Stopped (04/12/19 0530)    norepinephrine 8 mcg/min (04/12/19 0600)    sodium chloride 150 mL/hr at 04/12/19 0500     PRN Meds: magnesium sulfate, sodium phosphate IVPB **OR** sodium phosphate IVPB, potassium chloride **OR** potassium alternative oral replacement **OR** potassium chloride, sodium chloride flush, sodium chloride flush, acetaminophen, morphine, ketorolac    Data:     Past Medical History:   has a past medical history of Abnormal computed tomography of cervical spine, CVA (cerebral vascular accident) ([address] GERD (gastroesophageal reflux disease), Hypertension, Paraproteinemia, and Weight loss. Social History:   reports that she has been smoking. She has a 30.00 pack-year smoking history. She has never used smokeless tobacco. She reports that she drank about 16.8 oz of alcohol per week.  She reports that she does not use drugs. Family History: History reviewed. No pertinent family history. Vitals:  BP 97/64   Pulse 98   Temp 98.4 °F (36.9 °C) (Oral)   Resp 18   Ht 5' (1.524 m)   Wt 102 lb 4.8 oz (46.4 kg)   SpO2 98%   BMI 19.98 kg/m²   Temp (24hrs), Av.8 °F (36.6 °C), Min:97.5 °F (36.4 °C), Max:98.4 °F (36.9 °C)    No results for input(s): POCGLU in the last 72 hours. I/O(24Hr): Intake/Output Summary (Last 24 hours) at 2019 0728  Last data filed at 2019 0630  Gross per 24 hour   Intake 4630 ml   Output 3900 ml   Net 730 ml       Labs:    [unfilled]    Lab Results   Component Value Date/Time    SPECIAL NOT REPORTED 2019 06:49 PM     Lab Results   Component Value Date/Time    CULTURE CULTURE IN PROGRESS 2019 06:49 PM       [unfilled]    Radiology:    [place] Deras Femur Left (min 2 Views)    Result Date: 3/27/2019  EXAMINATION: 4 XRAY VIEWS OF THE LEFT FEMUR; 2 XRAY VIEWS OF THE LEFT KNEE; 3 XRAY VIEWS OF THE LEFT TIBIA AND FIBULA 3/27/2019 7:00 pm COMPARISON: Left hip 2015. HISTORY: ORDERING SYSTEM PROVIDED HISTORY: pain TECHNOLOGIST PROVIDED HISTORY: pain Ordering Physician Provided Reason for Exam: pt twisted wrong, lt knee pain, unable to straighten knee. Acuity: Acute Type of Exam: Initial FINDINGS: Left femur, four views: The bones are diffusely osteopenic. No acute fracture deformity. The hip joint is maintained. The femoral head projects within the acetabulum. No focal soft tissue abnormality is seen. Left knee, two views: The knee is flexed. No acute osseous abnormality. No joint effusion. Joint spaces are not optimally profiled but no significant arthritic changes are apparent. Left tib-fib, three views: There is evidence of a remote healed distal tibia fracture. No acute fracture or dislocation is seen. No focal soft tissue abnormality. No acute osseous abnormality of the left femur. No acute osseous abnormality of the left knee.  No acute osseous abnormality of the left tib-fib. Healed remote distal tibia fracture. Marked osteopenia, likely due to disuse. Xr Knee Left (1-2 Views)    Result Date: 3/27/2019  EXAMINATION: 4 XRAY VIEWS OF THE LEFT FEMUR; 2 XRAY VIEWS OF THE LEFT KNEE; 3 XRAY VIEWS OF THE LEFT TIBIA AND FIBULA 3/27/2019 7:00 pm COMPARISON: Left hip 11/14/2015. HISTORY: ORDERING SYSTEM PROVIDED HISTORY: pain TECHNOLOGIST PROVIDED HISTORY: pain Ordering Physician Provided Reason for Exam: pt twisted wrong, lt knee pain, unable to straighten knee. Acuity: Acute Type of Exam: Initial FINDINGS: Left femur, four views: The bones are diffusely osteopenic. No acute fracture deformity. The hip joint is maintained. The femoral head projects within the acetabulum. No focal soft tissue abnormality is seen. Left knee, two views: The knee is flexed. No acute osseous abnormality. No joint effusion. Joint spaces are not optimally profiled but no significant arthritic changes are apparent. Left tib-fib, three views: There is evidence of a remote healed distal tibia fracture. No acute fracture or dislocation is seen. No focal soft tissue abnormality. No acute osseous abnormality of the left femur. No acute osseous abnormality of the left knee. No acute osseous abnormality of the left tib-fib. Healed remote distal tibia fracture. Marked osteopenia, likely due to disuse. Xr Knee Left (3 Views)    Result Date: 3/30/2019  EXAMINATION: 3 XRAY VIEWS OF THE LEFT KNEE 3/30/2019 10:58 am COMPARISON: March 27, 2018 HISTORY: ORDERING SYSTEM PROVIDED HISTORY: F/u L knee pain after cast TECHNOLOGIST PROVIDED HISTORY: AP, oblique, lateral F/u L knee pain after cast FINDINGS: Marked osteopenia. Interval casting, which degrades fine osseous detail question cortical offset in the lateral femoral metaphysis. No malalignment identified. No significant joint effusion. Interval casting. Question nondisplaced fracture in the lateral femoral metaphysis. Osteopenia.      Flo George Tibia Fibula Left (2 Views)    Result Date: 3/27/2019  EXAMINATION: 4 XRAY VIEWS OF THE LEFT FEMUR; 2 XRAY VIEWS OF THE LEFT KNEE; 3 XRAY VIEWS OF THE LEFT TIBIA AND FIBULA 3/27/2019 7:00 pm COMPARISON: Left hip 11/14/2015. HISTORY: ORDERING SYSTEM PROVIDED HISTORY: pain TECHNOLOGIST PROVIDED HISTORY: pain Ordering Physician Provided Reason for Exam: pt twisted wrong, lt knee pain, unable to straighten knee. Acuity: Acute Type of Exam: Initial FINDINGS: Left femur, four views: The bones are diffusely osteopenic. No acute fracture deformity. The hip joint is maintained. The femoral head projects within the acetabulum. No focal soft tissue abnormality is seen. Left knee, two views: The knee is flexed. No acute osseous abnormality. No joint effusion. Joint spaces are not optimally profiled but no significant arthritic changes are apparent. Left tib-fib, three views: There is evidence of a remote healed distal tibia fracture. No acute fracture or dislocation is seen. No focal soft tissue abnormality. No acute osseous abnormality of the left femur. No acute osseous abnormality of the left knee. No acute osseous abnormality of the left tib-fib. Healed remote distal tibia fracture. Marked osteopenia, likely due to disuse. Ct Abdomen Pelvis W Iv Contrast    Result Date: 4/11/2019  EXAMINATION: CT OF THE ABDOMEN AND PELVIS WITH CONTRAST 4/11/2019 5:14 pm TECHNIQUE: CT of the abdomen and pelvis was performed with the administration of intravenous contrast. Multiplanar reformatted images are provided for review. Dose modulation, iterative reconstruction, and/or weight based adjustment of the mA/kV was utilized to reduce the radiation dose to as low as reasonably achievable. COMPARISON: None.  HISTORY: ORDERING SYSTEM PROVIDED HISTORY: Abdominal pain TECHNOLOGIST PROVIDED HISTORY: IV Only Contrast Ordering Physician Provided Reason for Exam: patient c/o abd pain for an hour FINDINGS: Lower Chest: Trace pleural fluid bilaterally is noted. Indwelling cardiac pacemaker is present. Heart size is normal.  Coronary artery calcifications are evident. The esophagus is significantly dilated and fluid-filled. No paraesophageal adenopathy is evident. Organs: The spleen, pancreas, and adrenals are unremarkable. The liver contains hypodensities, likely cysts. The gallbladder is distended and contains a solitary gallstone. The kidneys excrete contrast bilaterally. Extrarenal collecting systems are noted. The ureters are mildly dilated down to the urinary bladder without evidence of intraluminal or ureteral obstructing calculi. GI/Bowel: Marked distention of the stomach is noted; this continues to the pylorus with appearance suggesting partial gastric outlet obstruction. Fluid is present in some small bowel loops and colon distal to the stomach. No small bowel obstruction. Pelvis: Marked distention of the urinary bladder is noted. 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 Distended ureters may be related to reflux or infection. No free pelvic fluid, pelvic or inguinal adenopathy is noted. Peritoneum/Retroperitoneum: No aortic aneurysm. Mild to moderate plaque is noted in the infrarenal abdominal aorta and both proximal iliac arteries. Shotty lymph nodes are present around the aorta in the upper abdomen. No mesenteric adenopathy is noted. Bones/Soft Tissues: Degenerative changes are present in the hips and lower lumbar facets. Estimated biologic radiation dose for this procedure:258.77 mGy/cm2.     1. Dilatation of the thoracic esophagus filled with fluid. No obstructive process is noted. No adjacent enlarged lymph nodes. 2. Trace pleural fluid. 3. Marked distention of the stomach. This appears to extend to the pylorus. Partial gastric outlet obstruction is not excluded. 4. Bilateral dilated ureters without obstructing calculi.   Urinary bladder is markedly distended with bladder wall air suggesting emphysematous cystitis. Dilatation of the ureters may be related to reflux or infection. 5. Atherosclerotic disease. 6. Other findings as above. Critical results were called by Dr. Angelique Singh MD to [address] on 4/11/2019 at 17:37. Xr Chest Portable    Result Date: 4/12/2019  EXAMINATION: SINGLE XRAY VIEW OF THE CHEST 4/12/2019 5:26 am COMPARISON: November 14, 2015. HISTORY: ORDERING SYSTEM PROVIDED HISTORY: line placement TECHNOLOGIST PROVIDED HISTORY: line placement Ordering Physician Provided Reason for Exam: New right side line placement. Acuity: Acute Type of Exam: Initial Additional signs and symptoms: New right side line placement. FINDINGS: Stable left pectoral trans venous cardiac pacer device. New right IJ central venous catheter with tip near the superior atrial caval junction. Normal lung volume. No new consolidation. Curvilinear radiopacity projecting over the right upper lobe likely represents artifact from a skin fold. No pleural effusion or pneumothorax. Stable cardiomediastinal silhouette and great vessels with redemonstration of atherosclerotic thoracic aorta. New right IJ central venous catheter with tip near the superior atrial caval junction. No pneumothorax. No new consolidation. Physical Examination:        Physical Exam   Constitutional: She appears well-developed. She appears cachectic. She appears ill. No distress. HENT:   Head: Normocephalic and atraumatic. Eyes: Pupils are equal, round, and reactive to light. Conjunctivae and EOM are normal.   Neck: Normal range of motion. Right IJ central line in place. Site clean and dry. Cardiovascular: Normal rate, regular rhythm, normal heart sounds and intact distal pulses. Exam reveals no gallop and no friction rub. No murmur heard. Pulmonary/Chest: Effort normal and breath sounds normal. No stridor. No respiratory distress. She has no wheezes. She has no rales. Abdominal: Soft.  She exhibits distension. She exhibits no mass. Bowel sounds are decreased. There is generalized tenderness. There is no rebound and no guarding. Musculoskeletal: Normal range of motion. She exhibits no edema. Left knee wrapped in dressing. Neurological: She is alert. Skin: Skin is warm and dry. She is not diaphoretic. There is pallor. 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 Coli and susceptible to Cipro  UA + nitrites, large leuk est, many bacteria, mod Hb   BCx negative x 10 hours  F/U UCx  F/U MRSA  F/U CXR  Lactic Acid 2.9 --> 1.9 --> trend  Merrem  Vancomycin  Milk of Molasses qd PRN for BMs  CT Abd/Pelvis: Dilatation of thoracic esophagus filled w/ fluid, distension of stomach +/- partial gastric obstruction, b/l dilated ureters w/o obstructing caliculi w/markedly distended bladder suggesting emphysematous cystitis  Levophed --> wean appropriately, monitor pressures  LR @ 150 cc/hr  Strict I/Os    Urology consult, appreciate recs --> catheterized patient  Critical care consult, appreciate recs  Gen Surg, right IJ central line placed on 4/12     Maintenance  Lovenox  Continue home meds trazodone, ASA, Plavix, Norvasc, Effexor, Spiriva     Dispo  PT/OT Eval and treat  Social work for [address] [place] to home with continued abx once stabilized    Fernando Alaniz MD  4/12/2019  7:28 AM

## 2019-04-12 NOTE — PLAN OF CARE
Problem: Falls - Risk of:  Goal: Will remain free from falls  Outcome: Met This Shift  Goal: Absence of physical injury  Outcome: Met This Shift     Problem: Risk for Impaired Skin Integrity  Goal: Tissue integrity - skin and mucous membranes  Outcome: Ongoing  Note:   Coccyx/sacrum red. Moisture barrier cream applied. No open areas noted. Problem: Infection, Septic Shock:  Goal: Will show no infection signs and symptoms  Outcome: Ongoing  Note:   Received multiple fluid boluses, remained hypotensive. Central line placed and Levophed started. BP improved.       Problem: Tissue Perfusion, Altered:  Goal: Circulatory function within specified parameters  Outcome: Ongoing

## 2019-04-12 NOTE — FLOWSHEET NOTE
04/12/19 0353   Provider Notification   Reason for Communication New orders;Critical Value (comment); Review case   Provider Name Dr. Maryse Simms   Provider Notification Physician   Method of Communication Page   Response See orders   Notification Time 3771   Notified of new consult. Reviewed admission, labs, vital signs, CT abdomen results. Informed him pt has received 6L of fluid overnight and dopamine started. 4L urine output throughout the night. Current BP is 77/53, MAP 58. Orders received to start low dose Levophed peripherally and give another 1L NS bolus. Informed him a page has been placed to Dr. Juan Solano for line placement.

## 2019-04-12 NOTE — DISCHARGE INSTR - COC
Continuity of Care Form    Patient Name: Erin Damon   :  1950  MRN:  [id number]    Admit date:  2019  Discharge date:      Code Status Order: Full Code   Advance Directives:   [facility] Documentation     Date/Time Healthcare Directive Type of Healthcare Directive Copy in [facility] [address] Agent's Name Healthcare Agent's Phone Number    [phone]  No, patient does not have an advance directive for healthcare treatment -- -- -- -- --          Admitting Physician:  Portia Parekh MD  PCP: Lucrecia Gomez DO    Discharging Nurse: Álvaro Frausto Unit/Room#:   Discharging Unit Phone Number: [phone]    Emergency Contact:   Extended Emergency Contact Information  Primary Emergency Contact: Ed Landaverde  Home Phone: [phone]  Relation: Brother/Sister  Secondary Emergency Contact: Savannah Tovar  Mobile Phone: [phone]  Relation: Child    Past Surgical History:  Past Surgical History:   Procedure Laterality Date    PACEMAKER PLACEMENT  2011    Pacemaker is Medtronic Revo (compatible). Leads placed in  are NOT MRI compatible. Placed at [facility]. V's per Dr. Bryson Sam can not have an MRI.        Immunization History:   Immunization History   Administered Date(s) Administered    Influenza, Triv, 3 Years and older, IM (Afluria (5 yrs and older) 10/07/2013    Pneumococcal Polysaccharide ([id number]) 2012       Active Problems:  Patient Active Problem List   Diagnosis Code    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    CVA (cerebral vascular accident) ([facility] [address] I63.9    Abnormal computed tomography of cervical spine R93.7    Weight loss R63.4    Functional gait abnormality R26.89    Left knee pain M25.562    Knee pain, left M25.562    Acute pain of left knee M25.562    Sepsis due to urinary tract infection ([address] A41.9, N39.0    Cystitis N30.90       Isolation/Infection:   Isolation          No Isolation            Nurse Assessment:  Last Vital Signs: /74   Pulse 111   Temp 97.7 °F (36.5 °C) (Oral)   Resp 23   Ht 5' (1.524 m)   Wt 102 lb 4.8 oz (46.4 kg)   SpO2 98%   BMI 19.98 kg/m²     Last documented pain score (0-10 scale): Pain Level: 0  Last Weight:   Wt Readings from Last 1 Encounters:   04/11/19 102 lb 4.8 oz (46.4 kg)     Mental Status:  oriented and alert    IV Access:  - None    Nursing Mobility/ADLs:  Walking   Dependent  Transfer  Dependent  Bathing  Dependent  Dressing  Dependent  Toileting  Dependent  Feeding  Assisted  Med Admin  Dependent  Med Delivery   whole    Wound Care Documentation and Therapy:  Wound 04/11/19 Coccyx Mid (Active)   Wound Pressure Stage  1 4/12/2019  8:00 AM   Dressing/Treatment Moisture barrier;Open to air 4/12/2019  8:00 AM   Wound Assessment Red 4/12/2019  8:00 AM   Number of days: 0        Elimination:  Continence:   · Bowel: No  · Bladder: No  Urinary Catheter: None   Colostomy/Ileostomy/Ileal Conduit: No       Date of Last BM: 5/31/2019    Intake/Output Summary (Last 24 hours) at 4/12/2019 1209  Last data filed at 4/12/2019 0630  Gross per 24 hour   Intake 4630 ml   Output 3900 ml   Net 730 ml     I/O last 3 completed shifts: In: 4630 [I.V.:4630]  Out: [facility] [Urine:3900]    Safety Concerns: At Risk for Falls    Impairments/Disabilities:      Vision, 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   Ventilator:    - No ventilator supportRehab Therapies: Physical Therapy and Occupational Therapy  Weight Bearing Status/Restrictions: No weight bearing restirctions  Other Medical Equipment (for information only, NOT a DME order):  wheelchair, walker and hospital bed  Other Treatments: skilled Nursing Assessment and monitoring, Medication education. Portable Chest xray on Monday, 6/3. Patient's personal belongings (please select all that are sent with patient):  None    RN SIGNATURE:  Justine Meyer    CASE MANAGEMENT/SOCIAL WORK SECTION    Inpatient Status Date: 4/11/2019    Readmission Risk Assessment Score:  Readmission Risk              Risk of Unplanned Readmission:        13           Discharging to Facility/ Agency     [address] [address]  Phone [phone]  Fax [phone]  Evening fax [phone]       Please make patient an appointment with their PCP within 7 days of discharge from your facility. Please refer patient to interim Home care  when discharged from your facility for home health services. [facility]  [address] [place] [address] Dr  Amigo, [address] [place] [address]  P:  [phone]  F:  [phone]  Home health care agency's  to evaluate patient two weeks prior to discharge from home health to determine post-discharge services. / signature: Electronically signed by МАРИЯ Lindsay, LSW on 4/12/19 at 12:10 PM    PHYSICIAN SECTION    Prognosis: Good    Condition at Discharge: Stable    Rehab Potential (if transferring to Rehab): Good    Recommended Labs or Other Treatments After Discharge: Chest XR on Monday, June 3rd    Physician Certification: I certify the above information and transfer of Winston Martinez  is necessary for the continuing treatment of the diagnosis listed and that she requires [place] for less 30 days.      Update Admission H&P: No change in H&P    PHYSI MONROE SIGNATURE:  Electronically signed by Pamela Ortiz MD on 5/6/19 at 12:06 PM  Electronically signed by Leanna Cancino MD on 5/31/2019 at 11:28 AM

## 2019-04-12 NOTE — OP NOTE
Operative Note    PATIENT NAME: [facility]               MEDICAL RECORD NO. [id number]                DATE OF PROCEDURE: 4/12/2019  PRIMARY CARE PHYSICIAN: Maria C Cisse DO  PREOPERATIVE DIAGNOSIS:  sepsis  POSTOPERATIVE DIAGNOSIS:  Same  PROCEDURE PERFORMED: Right internal jugular vein Central Venous Catheter Insertion with US Guidance. SURGEON:  Howie Estrada DO, FACS  ANESTHESIA:  local  BLOOD LOSS:   <5 ml. SPECIMENS:  None. COMPLICATIONS:  None immediately appreciated. INDICATION FOR PROCEDURE: The patient is a 77y.o. year old female with a history of severe sepsis with hypotension. We were asked to place a central venous chatheter. Procedure, risks, and benefits as well as possible complications were explained to the patient and consent was obtained. PROCEDURE:  The patient was placed in the Trendelenburg position. Neck was prepped and draped in the usual sterile fashion. Time-out was called. Using ultrasound guidance, the right internal jugular vein was identified and lidocaine was infiltrated over it for local anesthesia. A vascular needle was then used to access the vein under the ultrasound guidance without any difficulty, with return of dark, non-pulsatile blood. A guidewire was passed through the needle into the inferior vena cava and the needle was removed. Wire placement into the vein visualized using US. The tract to the vein was dilated serially over the guidewire after a small nick incision was made at the skin. The dilator was then removed and the central venous catheter was fed over the guidewire in a Seldinger technique into the vein. The guidewire was removed and all ports were tested and were noted to aspirate and flush without difficulty or resistance. All ports were flushed with saline. Catheter was secured to the skin using a 2-0 nylon suture and sterile dressing was applied. The patient tolerated the procedure well. Postprocedure chest x-ray was ordered.

## 2019-04-12 NOTE — PROGRESS NOTES
Physical Therapy  DATE: 2019    NAME: Di Bah  MRN: [id number]   : 1950    Patient not seen this date for Physical Therapy due to:  [] Blood transfusion in progress  [] Hemodialysis  []  Patient Declined  [] Spine Precautions   [] Strict Bedrest  [] Surgery/ Procedure  [] Testing      [x] Other- Held due to medical issues        [] PT being discontinued at this time. Patient independent. No further needs. [] PT being discontinued at this time as the patient has been transferred to palliative care. No further needs.     Jessica Ponce, PT

## 2019-04-12 NOTE — PROGRESS NOTES
Called [facility] to see if pt was started on any antibiotics after discharge from [facility]. Perez's since her urine culture came back positive for E. Coli on 4/2/2019. The nurse this writer spoke to at [facility] states no antibiotics were started. Keke White notified of sensitivity report from that urine culture and Cipro added.

## 2019-04-12 NOTE — PROGRESS NOTES
Monitoring    Nutrition Evaluation:   · Evaluation: Goals set   · Goals: Meet % of nutrient needs with PO diet and supplements. · Monitoring: Meal Intake, Supplement Intake, Weight, Pertinent Labs, Nausea or Vomiting, Diet Tolerance, I&O      SHITAL Rincon R.D..   Clinical Dietitian  Office: [phone]

## 2019-04-12 NOTE — FLOWSHEET NOTE
04/11/19 2232   Provider Notification   Reason for Communication Critical Value (comment)  (SBP 80)   Provider Name Sara Morales   Provider Notification Nurse Practitioner   Method of Communication Secure Message   Response See orders   Notification Time [phone]   Notified of drop in BP after bolus completed. Orders received for additional 1L bolus.

## 2019-04-12 NOTE — FLOWSHEET NOTE
04/12/19 0105   Provider Notification   Reason for Communication Critical Value (comment)  (77/50, MAP 57)   Provider Name Steve Doctor   Provider Notification Nurse Practitioner   Method of Communication Face to face   Response See orders   Notification Time 0105   Notified of BP trending back down after bolus completed. Orders received for additional 500ml bolus.

## 2019-04-12 NOTE — CARE COORDINATION
CASE MANAGEMENT NOTE:    Admission Date:  4/11/2019 Sue Kehr is a 77 y.o.  female    Admitted for : Sepsis due to urinary tract infection ([facility] [address] [A41.9, N39.0]  Cystitis [N30.90]  Cystitis [N30.90]    Met with:  Patient    PCP:  Christ Dietrich                                Insurance:  Ivana Primus      Current Residence/ Living Arrangements:  in nursing home, Informed, by Balaji Guillermo, pt is from [facility]          SNF needed: Yes, [facility]:  [facility]       Is the Patient an Satmetrix with Readmission Risk Score greater than 14%? No  If yes, pt needs a follow up appointment made within 7 days. Family Members/Caregivers that pt would like involved in their care:    Yes    If yes, list name here:  Landon White         Is patient in Isolation/One on One/Altered Mental Status? No  If yes, skip next question. If no, would they like an I-Pad to  use? No  If yes, call [phone]. Discharge Plan:  4/12/19 [address] Pt. Per LSW, Pt. Is from [facility] & most likely will need to return. Ashley will need signed/completed.  IV Merrem/ Vanco.Levophed, PT/OT, WBC 55.4, K+ 2.7, Pulmonary on board, Will continue to follow w/ LSW for needs//KB               Electronically signed by: Darrick Mendez, RN on 4/12/2019 at 2:07 PM

## 2019-04-12 NOTE — FLOWSHEET NOTE
04/12/19 0146   Provider Notification   Reason for Communication New orders   Provider Name Steve Doctor   Provider Notification Nurse Practitioner   Method of Communication Face to face   Response See orders   Notification Time 0146   Bolus complete, SBP 70's. Orders received to start Dopamine, titrate to SBP >90.

## 2019-04-12 NOTE — PLAN OF CARE
Problem: Risk for Impaired Skin Integrity  Goal: Tissue integrity - skin and mucous membranes  Description  Structural intactness and normal physiological function of skin and  mucous membranes. 4/12/2019 1637 by Kale Nguyen RN  Outcome: Ongoing  Skin intact and maintained. No new skin breakdown. Turned and repositioned every two. Problem: Falls - Risk of:  Goal: Will remain free from falls  Description  Will remain free from falls  4/12/2019 1637 by Kale Nguyen RN  Outcome: Ongoing  No falls this shift. Patient oriented. No attempts to get out of bed,      Problem: Infection, Septic Shock:  Goal: Will show no infection signs and symptoms  Description  Will show no infection signs and symptoms  4/12/2019 1637 by Kale Nguyen RN  Outcome: Ongoing  I and O monitored. Afebrile. ABC elevated, recheck in am     Problem: Pain:  Goal: Pain level will decrease  Description  Pain level will decrease  Outcome: Ongoing  Pain assessed and medication given prn. Repositioned for comfort.

## 2019-04-12 NOTE — FLOWSHEET NOTE
04/12/19 1651   Encounter Summary   Services provided to: Patient   Referral/Consult From: Ольга   Continue Visiting   (4/12/19)   Complexity of Encounter Low   Length of Encounter 15 minutes   Routine   Type Follow up   Spiritual/Buddhism   Type Ritual   Intervention Anointing   Sacraments   Sacrament of Sick-Anointing Anointed  (Irlanda Serra 4/12/19)

## 2019-04-12 NOTE — FLOWSHEET NOTE
04/12/19 1632   Encounter Summary   Services provided to: Patient   Referral/Consult From: Ольга   Continue Visiting   (4/12/19)   Complexity of Encounter Moderate   Length of Encounter 15 minutes   Spiritual Assessment Completed Yes   Routine   Type Initial   Spiritual/Latter-day   Type Spiritual support   Assessment Approachable; Anxious; Helplessness; Hopeful   Intervention Active listening;Nurtured hope;Prayer;Sustaining presence/ Ministry of presence; Discussed illness/injury and it's impact   Outcome Comfort;Expressed gratitude;Engaged in conversation; Hopeful;Receptive

## 2019-04-13 ENCOUNTER — APPOINTMENT (OUTPATIENT)
Dept: GENERAL RADIOLOGY | Age: 69
DRG: 853 | End: 2019-04-13
Payer: COMMERCIAL

## 2019-04-13 LAB
ALBUMIN SERPL-MCNC: 1.8 G/DL (ref 3.5–5.2)
ALBUMIN/GLOBULIN RATIO: ABNORMAL (ref 1–2.5)
ALP BLD-CCNC: 92 U/L (ref 35–104)
ALT SERPL-CCNC: 16 U/L (ref 5–33)
ANION GAP SERPL CALCULATED.3IONS-SCNC: 9 MMOL/L (ref 9–17)
AST SERPL-CCNC: 28 U/L
BILIRUB SERPL-MCNC: 0.35 MG/DL (ref 0.3–1.2)
BUN BLDV-MCNC: 12 MG/DL (ref 8–23)
BUN/CREAT BLD: ABNORMAL (ref 9–20)
CALCIUM SERPL-MCNC: 6.9 MG/DL (ref 8.6–10.4)
CHLORIDE BLD-SCNC: 106 MMOL/L (ref 98–107)
CO2: 16 MMOL/L (ref 20–31)
CREAT SERPL-MCNC: <0.4 MG/DL (ref 0.5–0.9)
CULTURE: ABNORMAL
DIRECT EXAM: NORMAL
GFR AFRICAN AMERICAN: ABNORMAL ML/MIN
GFR NON-AFRICAN AMERICAN: ABNORMAL ML/MIN
GFR SERPL CREATININE-BSD FRML MDRD: ABNORMAL ML/MIN/{1.73_M2}
GFR SERPL CREATININE-BSD FRML MDRD: ABNORMAL ML/MIN/{1.73_M2}
GLUCOSE BLD-MCNC: 94 MG/DL (ref 65–105)
GLUCOSE BLD-MCNC: 97 MG/DL (ref 70–99)
HCT VFR BLD CALC: 22 % (ref 36–46)
HCT VFR BLD CALC: 22.8 % (ref 36–46)
HCT VFR BLD CALC: 23.6 % (ref 36–46)
HEMOGLOBIN: 7.3 G/DL (ref 12–16)
HEMOGLOBIN: 7.5 G/DL (ref 12–16)
HEMOGLOBIN: 7.8 G/DL (ref 12–16)
INR BLD: 2.1
Lab: ABNORMAL
Lab: NORMAL
MCH RBC QN AUTO: 29.5 PG (ref 26–34)
MCHC RBC AUTO-ENTMCNC: 33.1 G/DL (ref 31–37)
MCV RBC AUTO: 89.3 FL (ref 80–100)
NRBC AUTOMATED: ABNORMAL PER 100 WBC
PDW BLD-RTO: 13 % (ref 11.5–14.9)
PLATELET # BLD: 243 K/UL (ref 150–450)
PMV BLD AUTO: 8.6 FL (ref 6–12)
POTASSIUM SERPL-SCNC: 4.1 MMOL/L (ref 3.7–5.3)
PROTHROMBIN TIME: 23.7 SEC (ref 11.8–14.6)
RBC # BLD: 2.64 M/UL (ref 4–5.2)
SODIUM BLD-SCNC: 131 MMOL/L (ref 135–144)
SPECIMEN DESCRIPTION: ABNORMAL
SPECIMEN DESCRIPTION: NORMAL
TOTAL PROTEIN: 4 G/DL (ref 6.4–8.3)
WBC # BLD: 36.9 K/UL (ref 3.5–11)

## 2019-04-13 PROCEDURE — 85027 COMPLETE CBC AUTOMATED: CPT

## 2019-04-13 PROCEDURE — 36592 COLLECT BLOOD FROM PICC: CPT

## 2019-04-13 PROCEDURE — 85610 PROTHROMBIN TIME: CPT

## 2019-04-13 PROCEDURE — 2580000003 HC RX 258: Performed by: INTERNAL MEDICINE

## 2019-04-13 PROCEDURE — 2500000003 HC RX 250 WO HCPCS: Performed by: INTERNAL MEDICINE

## 2019-04-13 PROCEDURE — 86738 MYCOPLASMA ANTIBODY: CPT

## 2019-04-13 PROCEDURE — 36415 COLL VENOUS BLD VENIPUNCTURE: CPT

## 2019-04-13 PROCEDURE — 97110 THERAPEUTIC EXERCISES: CPT

## 2019-04-13 PROCEDURE — 2580000003 HC RX 258: Performed by: STUDENT IN AN ORGANIZED HEALTH CARE EDUCATION/TRAINING PROGRAM

## 2019-04-13 PROCEDURE — 6360000002 HC RX W HCPCS: Performed by: STUDENT IN AN ORGANIZED HEALTH CARE EDUCATION/TRAINING PROGRAM

## 2019-04-13 PROCEDURE — 71045 X-RAY EXAM CHEST 1 VIEW: CPT

## 2019-04-13 PROCEDURE — 6360000002 HC RX W HCPCS: Performed by: INTERNAL MEDICINE

## 2019-04-13 PROCEDURE — 80053 COMPREHEN METABOLIC PANEL: CPT

## 2019-04-13 PROCEDURE — 2000000000 HC ICU R&B

## 2019-04-13 PROCEDURE — 99223 1ST HOSP IP/OBS HIGH 75: CPT | Performed by: INTERNAL MEDICINE

## 2019-04-13 PROCEDURE — 2700000000 HC OXYGEN THERAPY PER DAY

## 2019-04-13 PROCEDURE — 6360000002 HC RX W HCPCS: Performed by: NURSE PRACTITIONER

## 2019-04-13 PROCEDURE — 86920 COMPATIBILITY TEST SPIN: CPT

## 2019-04-13 PROCEDURE — 94762 N-INVAS EAR/PLS OXIMTRY CONT: CPT

## 2019-04-13 PROCEDURE — 6370000000 HC RX 637 (ALT 250 FOR IP): Performed by: STUDENT IN AN ORGANIZED HEALTH CARE EDUCATION/TRAINING PROGRAM

## 2019-04-13 PROCEDURE — 87449 NOS EACH ORGANISM AG IA: CPT

## 2019-04-13 PROCEDURE — 85018 HEMOGLOBIN: CPT

## 2019-04-13 PROCEDURE — 6370000000 HC RX 637 (ALT 250 FOR IP): Performed by: INTERNAL MEDICINE

## 2019-04-13 PROCEDURE — 99233 SBSQ HOSP IP/OBS HIGH 50: CPT | Performed by: INTERNAL MEDICINE

## 2019-04-13 PROCEDURE — 86901 BLOOD TYPING SEROLOGIC RH(D): CPT

## 2019-04-13 PROCEDURE — 85014 HEMATOCRIT: CPT

## 2019-04-13 PROCEDURE — 86850 RBC ANTIBODY SCREEN: CPT

## 2019-04-13 PROCEDURE — 82947 ASSAY GLUCOSE BLOOD QUANT: CPT

## 2019-04-13 PROCEDURE — G0328 FECAL BLOOD SCRN IMMUNOASSAY: HCPCS

## 2019-04-13 PROCEDURE — 86900 BLOOD TYPING SEROLOGIC ABO: CPT

## 2019-04-13 PROCEDURE — 94640 AIRWAY INHALATION TREATMENT: CPT

## 2019-04-13 PROCEDURE — 97161 PT EVAL LOW COMPLEX 20 MIN: CPT

## 2019-04-13 PROCEDURE — 87899 AGENT NOS ASSAY W/OPTIC: CPT

## 2019-04-13 RX ORDER — FENTANYL CITRATE 50 UG/ML
50 INJECTION, SOLUTION INTRAMUSCULAR; INTRAVENOUS
Status: DISCONTINUED | OUTPATIENT
Start: 2019-04-13 | End: 2019-05-15

## 2019-04-13 RX ORDER — DOCUSATE SODIUM 100 MG/1
100 CAPSULE, LIQUID FILLED ORAL 3 TIMES DAILY
Status: DISCONTINUED | OUTPATIENT
Start: 2019-04-13 | End: 2019-04-23

## 2019-04-13 RX ORDER — 0.9 % SODIUM CHLORIDE 0.9 %
1000 INTRAVENOUS SOLUTION INTRAVENOUS ONCE
Status: COMPLETED | OUTPATIENT
Start: 2019-04-13 | End: 2019-04-13

## 2019-04-13 RX ORDER — LEVOFLOXACIN 5 MG/ML
500 INJECTION, SOLUTION INTRAVENOUS EVERY 24 HOURS
Status: DISCONTINUED | OUTPATIENT
Start: 2019-04-13 | End: 2019-04-24

## 2019-04-13 RX ORDER — SODIUM CHLORIDE FOR INHALATION 0.9 %
3 VIAL, NEBULIZER (ML) INHALATION EVERY 8 HOURS PRN
Status: DISCONTINUED | OUTPATIENT
Start: 2019-04-13 | End: 2019-05-15

## 2019-04-13 RX ORDER — OXYCODONE HYDROCHLORIDE AND ACETAMINOPHEN 5; 325 MG/1; MG/1
1 TABLET ORAL EVERY 4 HOURS PRN
Status: DISCONTINUED | OUTPATIENT
Start: 2019-04-13 | End: 2019-05-15

## 2019-04-13 RX ORDER — LEVALBUTEROL 1.25 MG/.5ML
1.25 SOLUTION, CONCENTRATE RESPIRATORY (INHALATION) EVERY 4 HOURS
Status: DISCONTINUED | OUTPATIENT
Start: 2019-04-13 | End: 2019-05-08

## 2019-04-13 RX ADMIN — FENTANYL CITRATE 50 MCG: 50 INJECTION INTRAMUSCULAR; INTRAVENOUS at 20:04

## 2019-04-13 RX ADMIN — SODIUM CHLORIDE, POTASSIUM CHLORIDE, SODIUM LACTATE AND CALCIUM CHLORIDE: 600; 310; 30; 20 INJECTION, SOLUTION INTRAVENOUS at 13:40

## 2019-04-13 RX ADMIN — DOCUSATE SODIUM 100 MG: 100 CAPSULE, LIQUID FILLED ORAL at 14:30

## 2019-04-13 RX ADMIN — FAMOTIDINE 20 MG: 10 INJECTION, SOLUTION INTRAVENOUS at 08:35

## 2019-04-13 RX ADMIN — FENTANYL CITRATE 50 MCG: 50 INJECTION INTRAMUSCULAR; INTRAVENOUS at 22:04

## 2019-04-13 RX ADMIN — Medication 10 ML: at 20:17

## 2019-04-13 RX ADMIN — LEVOFLOXACIN 500 MG: 5 INJECTION, SOLUTION INTRAVENOUS at 13:30

## 2019-04-13 RX ADMIN — LEVALBUTEROL 1.25 MG: 1.25 SOLUTION, CONCENTRATE RESPIRATORY (INHALATION) at 19:45

## 2019-04-13 RX ADMIN — SODIUM CHLORIDE, POTASSIUM CHLORIDE, SODIUM LACTATE AND CALCIUM CHLORIDE: 600; 310; 30; 20 INJECTION, SOLUTION INTRAVENOUS at 23:25

## 2019-04-13 RX ADMIN — OXYCODONE AND ACETAMINOPHEN 1 TABLET: 5; 325 TABLET ORAL at 08:37

## 2019-04-13 RX ADMIN — POTASSIUM CHLORIDE 10 MEQ: 7.46 INJECTION, SOLUTION INTRAVENOUS at 01:15

## 2019-04-13 RX ADMIN — FENTANYL CITRATE 50 MCG: 50 INJECTION INTRAMUSCULAR; INTRAVENOUS at 17:41

## 2019-04-13 RX ADMIN — POTASSIUM CHLORIDE 10 MEQ: 7.46 INJECTION, SOLUTION INTRAVENOUS at 02:40

## 2019-04-13 RX ADMIN — MEROPENEM 1 G: 1 INJECTION, POWDER, FOR SOLUTION INTRAVENOUS at 09:00

## 2019-04-13 RX ADMIN — VENLAFAXINE 37.5 MG: 37.5 TABLET ORAL at 14:30

## 2019-04-13 RX ADMIN — VENLAFAXINE 37.5 MG: 37.5 TABLET ORAL at 08:37

## 2019-04-13 RX ADMIN — VENLAFAXINE 37.5 MG: 37.5 TABLET ORAL at 20:16

## 2019-04-13 RX ADMIN — OXYCODONE AND ACETAMINOPHEN 1 TABLET: 5; 325 TABLET ORAL at 21:29

## 2019-04-13 RX ADMIN — MEROPENEM 1 G: 1 INJECTION, POWDER, FOR SOLUTION INTRAVENOUS at 17:30

## 2019-04-13 RX ADMIN — ACETAMINOPHEN 650 MG: 325 TABLET, FILM COATED ORAL at 02:44

## 2019-04-13 RX ADMIN — LEVALBUTEROL 1.25 MG: 1.25 SOLUTION, CONCENTRATE RESPIRATORY (INHALATION) at 11:27

## 2019-04-13 RX ADMIN — NOREPINEPHRINE BITARTRATE 14 MCG/MIN: 1 INJECTION INTRAVENOUS at 17:57

## 2019-04-13 RX ADMIN — AZITHROMYCIN MONOHYDRATE 500 MG: 500 INJECTION, POWDER, LYOPHILIZED, FOR SOLUTION INTRAVENOUS at 07:30

## 2019-04-13 RX ADMIN — SODIUM CHLORIDE, POTASSIUM CHLORIDE, SODIUM LACTATE AND CALCIUM CHLORIDE: 600; 310; 30; 20 INJECTION, SOLUTION INTRAVENOUS at 10:00

## 2019-04-13 RX ADMIN — ASPIRIN 81 MG: 81 TABLET, COATED ORAL at 08:35

## 2019-04-13 RX ADMIN — MOMETASONE FUROATE AND FORMOTEROL FUMARATE DIHYDRATE 2 PUFF: 200; 5 AEROSOL RESPIRATORY (INHALATION) at 08:38

## 2019-04-13 RX ADMIN — MEROPENEM 1 G: 1 INJECTION, POWDER, FOR SOLUTION INTRAVENOUS at 00:13

## 2019-04-13 RX ADMIN — KETOROLAC TROMETHAMINE 30 MG: 30 INJECTION, SOLUTION INTRAMUSCULAR; INTRAVENOUS at 04:11

## 2019-04-13 RX ADMIN — CLOPIDOGREL BISULFATE 75 MG: 75 TABLET ORAL at 08:35

## 2019-04-13 RX ADMIN — LEVALBUTEROL 1.25 MG: 1.25 SOLUTION, CONCENTRATE RESPIRATORY (INHALATION) at 15:13

## 2019-04-13 RX ADMIN — TIOTROPIUM BROMIDE 18 MCG: 18 CAPSULE ORAL; RESPIRATORY (INHALATION) at 08:39

## 2019-04-13 RX ADMIN — KETOROLAC TROMETHAMINE 30 MG: 30 INJECTION, SOLUTION INTRAMUSCULAR; INTRAVENOUS at 20:04

## 2019-04-13 RX ADMIN — MORPHINE SULFATE 4 MG: 4 INJECTION INTRAVENOUS at 05:09

## 2019-04-13 RX ADMIN — VANCOMYCIN HYDROCHLORIDE 750 MG: 5 INJECTION, POWDER, LYOPHILIZED, FOR SOLUTION INTRAVENOUS at 11:26

## 2019-04-13 RX ADMIN — DOCUSATE SODIUM 100 MG: 100 CAPSULE, LIQUID FILLED ORAL at 20:16

## 2019-04-13 RX ADMIN — FAMOTIDINE 20 MG: 10 INJECTION, SOLUTION INTRAVENOUS at 20:16

## 2019-04-13 RX ADMIN — MOMETASONE FUROATE AND FORMOTEROL FUMARATE DIHYDRATE 2 PUFF: 200; 5 AEROSOL RESPIRATORY (INHALATION) at 20:04

## 2019-04-13 RX ADMIN — LEVALBUTEROL 1.25 MG: 1.25 SOLUTION, CONCENTRATE RESPIRATORY (INHALATION) at 07:29

## 2019-04-13 RX ADMIN — SODIUM CHLORIDE, POTASSIUM CHLORIDE, SODIUM LACTATE AND CALCIUM CHLORIDE: 600; 310; 30; 20 INJECTION, SOLUTION INTRAVENOUS at 02:38

## 2019-04-13 RX ADMIN — MORPHINE SULFATE 4 MG: 4 INJECTION INTRAVENOUS at 00:23

## 2019-04-13 RX ADMIN — POTASSIUM CHLORIDE 10 MEQ: 7.46 INJECTION, SOLUTION INTRAVENOUS at 00:12

## 2019-04-13 RX ADMIN — SODIUM CHLORIDE 1000 ML: 9 INJECTION, SOLUTION INTRAVENOUS at 12:15

## 2019-04-13 RX ADMIN — VASOPRESSIN 0.02 UNITS/MIN: 20 INJECTION INTRAVENOUS at 17:10

## 2019-04-13 ASSESSMENT — ENCOUNTER SYMPTOMS
APNEA: 0
SORE THROAT: 0
ABDOMINAL PAIN: 1
CONSTIPATION: 0
COUGH: 0
SHORTNESS OF BREATH: 0
ABDOMINAL DISTENTION: 1
NAUSEA: 0
BACK PAIN: 0
RHINORRHEA: 0
EYE DISCHARGE: 0
COLOR CHANGE: 0
VOMITING: 0
DIARRHEA: 0

## 2019-04-13 ASSESSMENT — PAIN SCALES - GENERAL
PAINLEVEL_OUTOF10: 0
PAINLEVEL_OUTOF10: 0
PAINLEVEL_OUTOF10: 10
PAINLEVEL_OUTOF10: 0
PAINLEVEL_OUTOF10: 10
PAINLEVEL_OUTOF10: 10
PAINLEVEL_OUTOF10: 3
PAINLEVEL_OUTOF10: 0
PAINLEVEL_OUTOF10: 8
PAINLEVEL_OUTOF10: 0
PAINLEVEL_OUTOF10: 10
PAINLEVEL_OUTOF10: 0
PAINLEVEL_OUTOF10: 10
PAINLEVEL_OUTOF10: 10
PAINLEVEL_OUTOF10: 3
PAINLEVEL_OUTOF10: 8
PAINLEVEL_OUTOF10: 0
PAINLEVEL_OUTOF10: 6
PAINLEVEL_OUTOF10: 4
PAINLEVEL_OUTOF10: 10
PAINLEVEL_OUTOF10: 10
PAINLEVEL_OUTOF10: 0
PAINLEVEL_OUTOF10: 0
PAINLEVEL_OUTOF10: 10

## 2019-04-13 ASSESSMENT — PAIN DESCRIPTION - LOCATION
LOCATION: GENERALIZED
LOCATION: LEG
LOCATION: ABDOMEN
LOCATION: KNEE
LOCATION: ABDOMEN

## 2019-04-13 ASSESSMENT — PAIN DESCRIPTION - DESCRIPTORS: DESCRIPTORS: ACHING

## 2019-04-13 ASSESSMENT — PAIN DESCRIPTION - ONSET: ONSET: PROGRESSIVE

## 2019-04-13 ASSESSMENT — PAIN DESCRIPTION - PAIN TYPE
TYPE: ACUTE PAIN
TYPE: ACUTE PAIN
TYPE: CHRONIC PAIN
TYPE: ACUTE PAIN

## 2019-04-13 ASSESSMENT — PAIN DESCRIPTION - ORIENTATION
ORIENTATION: LEFT
ORIENTATION: LEFT

## 2019-04-13 ASSESSMENT — PAIN DESCRIPTION - PROGRESSION: CLINICAL_PROGRESSION: NOT CHANGED

## 2019-04-13 ASSESSMENT — PAIN DESCRIPTION - FREQUENCY: FREQUENCY: INTERMITTENT

## 2019-04-13 NOTE — PLAN OF CARE
Problem: Risk for Impaired Skin Integrity  Goal: Tissue integrity - skin and mucous membranes  Description  Structural intactness and normal physiological function of skin and  mucous membranes. 4/13/2019 1820 by Ángel Bull RN  Outcome: Ongoing  Pt encouraged to turn and reposition. Education provided. 4/13/2019 0546 by Charlotte Dee RN  Outcome: Ongoing  Note:   No area of skin breakdown noted. Reposition patient frequently to prevent skin breakdown   4/13/2019 0544 by Charlotte Dee RN  Note:   No area of skin breakdown noted. Reposition patient frequently to prevent skin breakdown      Problem: Falls - Risk of:  Goal: Will remain free from falls  Description  Will remain free from falls  4/13/2019 1820 by Ángel Bull RN  Outcome: Ongoing  Patient remained free from falls. Call light within reach and bed in lowest position. Patient oriented to room and surroundings. 4/13/2019 0546 by Charlotte Dee RN  Outcome: Ongoing  Note:   No attempt to get oob. Safe environment provided. Bed down and locked. Uses call light appropriately   4/13/2019 0544 by Charlotte Dee RN  Note:   No attempt to get oob. Safe environment provided. Bed down and locked.  Uses call light appropriately   Goal: Absence of physical injury  Description  Absence of physical injury  Outcome: Ongoing     Problem: Infection, Septic Shock:  Goal: Will show no infection signs and symptoms  Description  Will show no infection signs and symptoms  Outcome: Ongoing     Problem: Tissue Perfusion, Altered:  Goal: Circulatory function within specified parameters  Description  Circulatory function within specified parameters  Outcome: Ongoing     Problem: Pain:  Goal: Pain level will decrease  Description  Pain level will decrease  Outcome: Ongoing  Goal: Control of acute pain  Description  Control of acute pain  4/13/2019 0546 by Charlotte Dee RN  Note:   Pt c/o abd pain and was medicated multiple times . Repositioned for comfort as needed. Restful environment provided   4/13/2019 0544 by Joyce Ponce RN  Note:   Pt c/o abd pain and was medicated multiple times  Repositioned for comfort as needed.  Restful environment provided

## 2019-04-13 NOTE — CONSULTS
[facility] -   Infectious diseases evaluation  admission date 4/11/2019    reason for consultation:   Complicated cysttiis    Impression :   Current:  · Emphysematous e coli cystitis  · leukemoid reaction  · Septic shock on pressors  · RLL aspiration pneumonia   · Acute encephalopathy from sepsis  · MRSA carrier  · Allergy t PNC SOB    Other:  ·   Discussion / summary of stay / plan of care   ·   Recommendations   · Legionella and strep pneumo . ags  · Keep vanco meropenem and add levaquin  for synergy on the E coli  · Once septic shock is over and WBC down to normal, would stop levaquin  · Will ultimately benefit from ceftaroline by itself if she can prove she has taken cephalosporins safely - will need her less encephalopathic to ask. · Case disc w medicine    Infection Control Recommendations   · [place] Precautions  · Contact Isolation       Antimicrobial Stewardship Recommendations   · Simplification of therapy  · Targeted therapy  · Per Kg dosing      Coordination ofOutpatient Care:   · Estimated Length of IV antimicrobials:  · Patient will need Midline / picc Catheter Insertion:   · Patient will need SNF:  · Patient will need outpatient wound care:     History of Present Illness:   Initial history:  Leno Sullivan is a 77y.o.-year-old female presents w dysuria x 6 days and lower abd pain, vomiting and found in septic shock leukemoid reaction and CT suggested emphysematous cystitis. admitted to ICU and repeat CXR showed a right lower infiltrate, likely an aspiration from her vomiting, and due to [facility] allergy, started on meropenem and vanco - we are called after the CT suggested the emphysematous cystitis. She is on pressors and alert but lethargic encephalopathic.not a reliable historian at this time  Allergy to [facility] w SOB and does not recall taking keflex.       Interval changes  4/13/2019     Summary of relevant labs:  Labs:  W 47 - 55 - 36  Creat normal  Micro:  U cx E coli  S cipro and ancef  blood culture 4/11 neg  Imaging:  CT AP 4/11/19  . Dilatation of the thoracic esophagus filled with fluid. No obstructive   process is noted.  No adjacent enlarged lymph nodes. 2. Trace pleural fluid. 3. Marked distention of the stomach.  This appears to extend to the pylorus. Partial gastric outlet obstruction is not excluded. 4. Bilateral dilated ureters without obstructing calculi.  Urinary bladder is   markedly distended with bladder wall air suggesting emphysematous cystitis. Dilatation of the ureters may be related to reflux or infection. CXR 4/12 RLL infiltrate    I have personally reviewed the past medical history, past surgical history, medications, social history, and family history, and I haveupdated the database accordingly. Past Medical History:     Past Medical History:   Diagnosis Date    Abnormal computed tomography of cervical spine     sclerotic bone appearance     CVA (cerebral vascular accident) ([address]     left  side weakness    GERD (gastroesophageal reflux disease)     Hypertension     Paraproteinemia     Weight loss        Past Surgical  History:     Past Surgical History:   Procedure Laterality Date    PACEMAKER PLACEMENT  07/2011    Pacemaker is Medtronic Revo (compatible). Leads placed in 1995 are NOT MRI compatible. Placed [facility]. V's per Dr. Saenz Co can not have an MRI.        Medications:      levalbuterol  1.25 mg Nebulization Q4H    docusate sodium  100 mg Oral TID    vancomycin  750 mg Intravenous Q24H    vancomycin (VANCOCIN) intermittent dosing (placeholder)   Other RX Placeholder    sodium chloride  1,000 mL Intravenous Once    levofloxacin  500 mg Intravenous Q24H    meropenem  1 g Intravenous Q8H    famotidine (PEPCID) injection  20 mg Intravenous BID    insulin lispro  0-12 Units Subcutaneous TID WC    insulin lispro  0-6 Units Subcutaneous Nightly    mometasone-formoterol  2 puff Inhalation BID    tiotropium  18 mcg Inhalation Daily    venlafaxine  37.5 mg Oral TID    clopidogrel  75 mg Oral Daily    aspirin  81 mg Oral Daily    sodium chloride flush  10 mL Intravenous 2 times per day    enoxaparin  40 mg Subcutaneous Daily       Social History:     Social History     Socioeconomic History    Marital status:      Spouse name: Not on file    Number of children: Not on file    Years of education: Not on file    Highest education level: Not on file   Occupational History    Not on file   Social Needs    Financial resource strain: Not on file    Food insecurity:     Worry: Not on file     Inability: Not on file    Transportation needs:     Medical: Not on file     Non-medical: Not on file   Tobacco Use    Smoking status: Current Some Day Smoker     Packs/day: 1.00     Years: 30.00     Pack years: 30.00    Smokeless tobacco: Never Used   Substance and Sexual Activity    Alcohol use: Not Currently     Alcohol/week: 16.8 oz     Types: 28 Glasses of wine per week    Drug use: No    Sexual activity: Not on file   Lifestyle    Physical activity:     Days per week: Not on file     Minutes per session: Not on file    Stress: Not on file   Relationships    Social connections:     Talks on phone: Not on file     Gets together: Not on file     Attends Restorationist service: Not on file     Active member of club or organization: Not on file     Attends meetings of clubs or organizations: Not on file     Relationship status: Not on file    Intimate partner violence:     Fear of current or ex partner: Not on file     Emotionally abused: Not on file     Physically abused: Not on file     Forced sexual activity: Not on file   Other Topics Concern    Not on file   Social History Narrative    Not on file       Family History:   History reviewed. No pertinent family history. Allergies:   Penicillins and Amoxicillin     Review of Systems:     Review of Systems   Constitutional: Positive for activity change.    HENT: Negative for congestion. Eyes: Negative for discharge. Respiratory: Negative for apnea. Gastrointestinal: Positive for abdominal pain. Genitourinary: Positive for dysuria, frequency and urgency. Negative for flank pain. Musculoskeletal: Negative for arthralgias and back pain. Skin: Negative for color change and pallor. Allergic/Immunologic: Negative for food allergies. Neurological: Negative for facial asymmetry. Hematological: Negative for adenopathy. Psychiatric/Behavioral: Negative for agitation and behavioral problems. Physical Examination :     Patient Vitals for the past 8 hrs:   BP Temp Temp src Pulse Resp SpO2   04/13/19 1200 (!) 77/44 98.1 °F (36.7 °C) Oral 113 19 94 %   04/13/19 1145 (!) 80/38 -- -- 112 15 94 %   04/13/19 1130 (!) 84/44 -- -- 111 16 95 %   04/13/19 1127 -- -- -- -- 17 94 %   04/13/19 1115 (!) 89/47 -- -- 110 15 95 %   04/13/19 1100 (!) 80/44 -- -- 110 15 95 %   04/13/19 1045 (!) 95/42 -- -- 110 12 94 %   04/13/19 1030 (!) 93/43 -- -- 108 16 94 %   04/13/19 1015 (!) 88/48 -- -- 109 19 94 %   04/13/19 1000 (!) 84/48 -- -- 108 18 95 %   04/13/19 0945 (!) 88/46 -- -- 110 15 95 %   04/13/19 0930 (!) 87/46 -- -- 111 18 94 %   04/13/19 0915 (!) 82/42 -- -- 110 17 94 %   04/13/19 0900 (!) 77/44 -- -- 115 16 95 %   04/13/19 0845 (!) 90/46 -- -- 119 19 95 %   04/13/19 0830 109/64 -- -- 115 18 94 %   04/13/19 0815 (!) 119/54 -- -- 115 18 94 %   04/13/19 0800 (!) 111/50 -- -- 115 16 94 %   04/13/19 0745 (!) 111/43 97.5 °F (36.4 °C) Axillary 115 20 95 %   04/13/19 0730 (!) 100/53 -- -- 114 18 95 %   04/13/19 0715 (!) 87/38 -- -- 114 17 95 %   04/13/19 0700 (!) 102/55 -- -- 114 20 94 %   04/13/19 0600 (!) 102/52 -- -- 119 19 92 %   04/13/19 0539 -- -- -- 118 20 (!) 89 %   04/13/19 0500 93/60 -- -- 116 22 91 %       Physical Exam   Constitutional: She appears well-developed and well-nourished. HENT:   Head: Normocephalic and atraumatic.    Eyes: Conjunctivae and EOM are normal. No scleral icterus. Neck: Neck supple. No tracheal deviation present. Cardiovascular: Normal rate and regular rhythm. Exam reveals no friction rub. No murmur heard. Pulmonary/Chest: No stridor. No respiratory distress. Abdominal: Soft. She exhibits no distension. There is tenderness. There is no guarding. Genitourinary:   Genitourinary Comments: Urine clear in a dyer   Musculoskeletal: She exhibits no edema or deformity. Neurological: She is alert. No cranial nerve deficit.   encephalopathic arousable answers questions ut might give different answers   Skin: Skin is warm and dry. No erythema. No pallor. Psychiatric: She has a normal mood and affect. Her behavior is normal.         Medical Decision Making:   I have independently reviewed/ordered the following labs:    CBC with Differential:   Recent Labs     04/11/19  1540 04/12/19  0231 04/13/19  0314 04/13/19  0740   WBC 47.9* 55.4* 36.9*  --    HGB 13.9 9.7* 7.8* 7.5*   HCT 42.3 30.5* 23.6* 22.8*    315 243  --    LYMPHOPCT 4*  --   --   --    MONOPCT 2  --   --   --      BMP:  Recent Labs     04/12/19  0231  04/12/19  1456 04/12/19  2108 04/13/19  0314      < > 130* 133* 131*   K 2.7*   < > 3.4* 3.3* 4.1   *   < > 106 107 106   CO2 16*   < > 15* 16* 16*   BUN 17  --   --   --  12   CREATININE <0.40*  --   --   --  <0.40*   MG 1.4*   < > 1.7 1.5*  --     < > = values in this interval not displayed. Hepatic Function Panel:   Recent Labs     04/12/19  0231 04/13/19  0314   PROT 4.5* 4.0*   LABALBU 2.2* 1.8*   BILITOT 0.24* 0.35   ALKPHOS 72 92   ALT 16 16   AST 26 28     No results for input(s): RPR in the last 72 hours. No results for input(s): HIV in the last 72 hours. No results for input(s): BC in the last 72 hours. Lab Results   Component Value Date    CREATININE <0.40 04/13/2019    GLUCOSE 97 04/13/2019    GLUCOSE 87 05/21/2012       Detailed results:         Thank you for allowing us to participate in the care

## 2019-04-13 NOTE — PROGRESS NOTES
Physical Therapy    Facility/Department: [facility] ICU  Initial Assessment    NAME: Fercho Esquivel  : 1950  MRN: [id number]    Date of Service: 2019    Discharge Recommendations:  Home with nursing aide   PT Equipment Recommendations  Equipment Needed: No    Assessment   Body structures, Functions, Activity limitations: Decreased functional mobility ; Decreased ADL status; Decreased strength; Increased Pain  Assessment: weakness all 4's limiting mobility  Treatment Diagnosis: weakness all 4's difficulty walking  Specific instructions for Next Treatment: progress with ex as allowed  Prognosis: Good  Decision Making: Low Complexity  History: 19 admitted due to abdominal pain and septicemia  Exam: L side hemiplegia,weak RUE/RLE  Clinical Presentation: AM-PAC mobility without stair climbing in patient 100%  Patient Education: active ex RUE/RLE  Barriers to Learning: none  REQUIRES PT FOLLOW UP: Yes  Activity Tolerance  Activity Tolerance: Treatment limited secondary to medical complications (free text)       Patient Diagnosis(es): The primary encounter diagnosis was Urinary retention. Diagnoses of Emphysematous cystitis and Septicemia ([address] were also pertinent to this visit. has a past medical history of Abnormal computed tomography of cervical spine, CVA (cerebral vascular accident) ([address] GERD (gastroesophageal reflux disease), Hypertension, Paraproteinemia, and Weight loss. has a past surgical history that includes pacemaker placement (2011).     Restrictions  Restrictions/Precautions  Restrictions/Precautions: Fall Risk  Implants present? : Pacemaker  Position Activity Restriction  Other position/activity restrictions: LLE in a long leg cast  Vision/Hearing  Vision: Within Functional Limits  Hearing: Within functional limits     Subjective  General  Chart Reviewed: Yes  Patient assessed for rehabilitation services?: Yes  Additional Pertinent Hx: admitted 19 due to abdominal pain  Family / Caregiver Present: No  Referring Practitioner: DR Dean Chawla  Referral Date : 04/13/19  Diagnosis: urinary retention,septicemia  Follows Commands: Within Functional Limits  Subjective  Subjective: no complains of pain today  Pain Screening  Patient Currently in Pain: No  Vital Signs  Patient Currently in Pain: No       Orientation  Orientation  Overall Orientation Status: Within Functional Limits  Social/Functional History  Social/Functional History  Lives With: Alone  Type of Home: Apartment  Home Layout: One level  Home Access: Level entry  Home Equipment: Rolling walker, Seldon Mckeon, Wheelchair-electric  Receives Help From: Home health  ADL Assistance: Needs assistance  Homemaking Assistance: Needs assistance  Ambulation Assistance: Needs assistance  Transfer Assistance: Needs assistance  Active : No  Occupation: Retired  Cognition    WFL    Objective  Observation/Palpation  Observation: LLE in a long leg cast  AROM RLE (degrees)  RLE AROM: WFL  PROM LLE (degrees)  LLE General PROM: not tested due to cast and having pain on L knee when moving it  AROM RUE (degrees)  RUE AROM : WFL  PROM LUE (degrees)  LUE General PROM: limited L ham/wrist ROM due to CVA  Strength RLE  Comment: 3-/5  Strength LLE  Comment: not tested due to pain  Strength RUE  Comment: 3-/5  Strength LUE  Comment: 0/5  Bed mobility  Rolling to Left: Unable to assess  Rolling to Right: Unable to assess  Sit to Supine: Unable to assess  Scooting: Unable to assess  Comment: patient on bed rest  Transfers  Sit to Stand: Unable to assess  Stand to sit: Unable to assess  Ambulation  Ambulation?: No  Stairs/Curb  Stairs?: No     Exercises  Comments: PROM LUE/AAROM RUE/RLE 10x     Plan   Plan  Times per week: 10 visits  Times per day: Daily  Specific instructions for Next Treatment: progress with ex as allowed  Current Treatment Recommendations: Strengthening, ROM, Functional Mobility Training, Home Exercise Program  Plan Comment:

## 2019-04-13 NOTE — PROGRESS NOTES
ICU Progress Note (Non-Vent)  [facility] Specialists    Patient - Lanie Paget,  Age - 77 y.o.    - 1950      Room Number -    MRN -  [id number]   Acct # - [de-identified]  Date of Admission -  2019  2:48 PM    Events of Past 24 Hours   Overnight, continue to complain of pain, still hypotensive and FiO2 requirements have increased  She had a bowel movement and is less tender    Vitals    height is 5' (1.524 m) and weight is 102 lb 4.8 oz (46.4 kg). Her oral temperature is 98 °F (36.7 °C). Her blood pressure is 102/52 (abnormal) and her pulse is 119. Her respiration is 19 and oxygen saturation is 92%.        Temperature Range: Temp: 98 °F (36.7 °C) Temp  Av.8 °F (36.6 °C)  Min: 97.7 °F (36.5 °C)  Max: 98 °F (36.7 °C)  BP Range:  Systolic (33GQH), XBS:646 , Min:72 , RXP:685     Diastolic (66QUO), HSJ:01, Min:35, Max:80    Pulse Range: Pulse  Av.1  Min: 98  Max: 120  Respiration Range: Resp  Av  Min: 15  Max: 33  Current Pulse Ox[de-identified]  SpO2: 92 %  24HR Pulse Ox Range:  SpO2  Av.1 %  Min: 89 %  Max: 100 %  Oxygen Amount and Delivery: O2 Flow Rate (L/min): 2 L/min    Wt Readings from Last 3 Encounters:   19 102 lb 4.8 oz (46.4 kg)   19 89 lb (40.4 kg)   19 94 lb 1.6 oz (42.7 kg)     I/O       Intake/Output Summary (Last 24 hours) at 2019 0621  Last data filed at 2019 7793  Gross per 24 hour   Intake 65763.66 ml   Output 3550 ml   Net 6628.66 ml     DRAIN/TUBE OUTPUT       Invasive Lines   ICP PRESSURE RANGE  No data recorded  CVP PRESSURE RANGE  No data recorded      Medications      meropenem  1 g Intravenous Q8H    albuterol  2.5 mg Nebulization Q4H    famotidine (PEPCID) injection  20 mg Intravenous BID    insulin lispro  0-12 Units Subcutaneous TID WC    insulin lispro  0-6 Units Subcutaneous Nightly    mometasone-formoterol  2 puff Inhalation BID    0.00 04/11/2019    BASOSABS 0.00 04/11/2019    DIFFTYPE NOT REPORTED 04/11/2019       BMP   Lab Results   Component Value Date     04/13/2019    K 4.1 04/13/2019     04/13/2019    CO2 16 04/13/2019    BUN 12 04/13/2019    CREATININE <0.40 04/13/2019    GLUCOSE 97 04/13/2019    GLUCOSE 87 05/21/2012       LFTS  Lab Results   Component Value Date    ALKPHOS 92 04/13/2019    ALT 16 04/13/2019    AST 28 04/13/2019    PROT 4.0 04/13/2019    BILITOT 0.35 04/13/2019    BILIDIR 0.21 05/17/2012    IBILI 0.36 05/17/2012    LABALBU 1.8 04/13/2019    LABALBU 4.6 05/17/2012       ABG ABGs: No results found for: PHART, PO2ART, XTO4IAL    Lab Results   Component Value Date    MODE NOT REPORTED 04/12/2019         INR  Recent Labs     04/12/19  0231 04/13/19  0314   PROTIME 21.8* 23.7*   INR 1.9 2.1       APTT  No results for input(s): APTT in the last 72 hours. Lactic Acid  Lab Results   Component Value Date    LACTA 1.5 04/12/2019    LACTA 1.9 04/12/2019    LACTA 2.9 04/11/2019        BNP   No results for input(s): BNP in the last 72 hours. Cultures       Radiology     CXR      CT Scans    (See actual reports for details)      SYSTEMS ASSESSMENT    Septic shock due to urinary infection from calculus  Right lower lobe pneumonia  Constipation  Hyponatremia  COPD  Tobacco dependence    Neuro   No obvious focal deficit    Respiratory   Wean oxygen as tolerated.  Keep O2 sat > 88%  Continue aerosols, but switch to Xopenex due to tachycardia  Humidify oxygen  Add flutter valve  Follow-up x-ray in 48 hours    Cardiovascular      Echo shows mildly decreased LV function and mildly increased RVSP  Lactic acid yesterday had normalized  Continue norepinephrine, keep map greater than 65  May need to add vasopressin  Pain is poorly controlled, will switch to fentanyl because of less hemodynamic affects, also had some Percocet as needed    Gastrointestinal   Finally had bowel movement    Renal   Continue lactated Ringer's, we will slightly decrease IV fluids    Infectious Disease   Awaiting final cultures, continue Merrem  Can discontinue vancomycin given negative MRSA  Add Zithromax, I'm concerned about right lower lobe infiltrate and need atypical coverage  Check serologies including Legionella and Mycoplasma    Hematology/Oncology   Hold Lovenox today given elevated INR  Monitor H&H's every 8  And she was if hemoglobin less than or equal 7 given hypotension  Obvious active bleeding    Endocrine   Blood sugars are acceptable    Social/Spiritual/DNR/Disposition/Other     Continue ICU care  Critical Care Time   35 min    Electronically signed by Emilee Dougherty MD on 4/13/2019 at 6:21 AM

## 2019-04-13 NOTE — PROGRESS NOTES
[address] [facility].    PROGRESS NOTE             4/13/2019    7:40 AM    Name:   Fercho Esquivel  MRN:     [id number]     Acct:      [de-identified]   Room:   2002/2002-01  IP Day:  2  Admit Date:  4/11/2019  2:48 PM    PCP:  Drew Lemus DO  Code Status:  Full Code    Subjective:     C/C:   Chief Complaint   Patient presents with    Abdominal Pain     Interval History Status: worsened. Patient seen and evaluated at bedside in the ICU. Hypotensive overnight. Levophed at 7 this AM.    Patient acknowledges feeling feverish overnight. Acknowledges improvement in abdominal pain after catheterization after large BM. States that pain is present only with palpation. Pain is generalized across the abdomen with light palpation. Denies dysuria and/or hematuria. Denies any other sx at this time. Brief History:     Per EMR:     The patient is a 77 y.o.  Female who presents withAbdominal Pain   and she is admitted to the hospital for the management of abdominal distension, nausea, vomiting, and acute cystitis.      Patient presents with chills, nausea, vomiting, abdominal pain (diffuse, but worse in the suprapubic region), abdominal distension, and dysuria of 2-3 days duration. Denies hematemesis. Acknowledges difficulty keeping food down but tolerating fluids. States abdominal pain is a 6/10 on average, and denies trying any medication to alleviate her sx.      Denies recent antibiotic use or steroid use.      ED: Tachycardic 100-114 BP, WBC 47.9 w/neutrophils 88,  UCx from 4/2 + E. Coli > 100,000 w/suceptibility to cipro. Review of Systems:     Review of Systems   Constitutional: Positive for fatigue and fever. Negative for chills. HENT: Negative for rhinorrhea and sore throat. Eyes: Negative for visual disturbance. Respiratory: Negative for cough and shortness of breath. Cardiovascular: Negative for leg swelling. Gastrointestinal: Positive for abdominal distention and abdominal pain (Diffuse). Negative for constipation, diarrhea, nausea and vomiting. Endocrine: Negative for polyuria. Genitourinary: Negative for dysuria and hematuria. Musculoskeletal: Negative for neck pain. Left knee pain     Skin: Negative for rash. Neurological: Negative for headaches. Medications: Allergies: Allergies   Allergen Reactions    Penicillins Shortness Of Breath and Rash    Amoxicillin        Current Meds:   Scheduled Meds:    levalbuterol  1.25 mg Nebulization Q4H    azithromycin  500 mg Intravenous Q24H    meropenem  1 g Intravenous Q8H    famotidine (PEPCID) injection  20 mg Intravenous BID    insulin lispro  0-12 Units Subcutaneous TID WC    insulin lispro  0-6 Units Subcutaneous Nightly    mometasone-formoterol  2 puff Inhalation BID    tiotropium  18 mcg Inhalation Daily    venlafaxine  37.5 mg Oral TID    clopidogrel  75 mg Oral Daily    aspirin  81 mg Oral Daily    sodium chloride flush  10 mL Intravenous 2 times per day    enoxaparin  40 mg Subcutaneous Daily     Continuous Infusions:    norepinephrine 7 mcg/min (04/13/19 0429)    lactated ringers 150 mL/hr at 04/13/19 0238    dextrose       PRN Meds: sodium chloride nebulizer, fentanNYL, oxyCODONE-acetaminophen, magnesium sulfate, sodium phosphate IVPB **OR** sodium phosphate IVPB, potassium chloride **OR** potassium alternative oral replacement **OR** potassium chloride, glucose, dextrose, glucagon (rDNA), dextrose, milk and molasses, sodium chloride flush, sodium chloride flush, acetaminophen, ketorolac    Data:     Past Medical History:   has a past medical history of Abnormal computed tomography of cervical spine, CVA (cerebral vascular accident) ([address] GERD (gastroesophageal reflux disease), Hypertension, Paraproteinemia, and Weight loss. Social History:   reports that she has been smoking.   She has a 30.00 pack-year smoking history. She has never used smokeless tobacco. She reports that she drank about 16.8 oz of alcohol per week. She reports that she does not use drugs. Family History: History reviewed. No pertinent family history. Vitals:  BP (!) 102/55   Pulse 114   Temp 98 °F (36.7 °C) (Oral)   Resp 18   Ht 5' (1.524 m)   Wt 102 lb 4.8 oz (46.4 kg)   SpO2 95%   BMI 19.98 kg/m²   Temp (24hrs), Av.8 °F (36.6 °C), Min:97.7 °F (36.5 °C), Max:98 °F (36.7 °C)    Recent Labs     19   POCGLU 90       I/O(24Hr): Intake/Output Summary (Last 24 hours) at 2019 0740  Last data filed at 2019 6711  Gross per 24 hour   Intake 5548.66 ml   Output 950 ml   Net 4598.66 ml       Labs:    [unfilled]    Lab Results   Component Value Date/Time    SPECIAL NOT REPORTED 2019 06:49 PM     Lab Results   Component Value Date/Time    CULTURE ESCHERICHIA COLI  50 to 100,000 CFU/ML   (A) 2019 06:49 PM       [unfilled]    Radiology:    Eren Jefferson Femur Left (min 2 Views)    Result Date: 3/27/2019  EXAMINATION: 4 XRAY VIEWS OF THE LEFT FEMUR; 2 XRAY VIEWS OF THE LEFT KNEE; 3 XRAY VIEWS OF THE LEFT TIBIA AND FIBULA 3/27/2019 7:00 pm COMPARISON: Left hip 2015. HISTORY: ORDERING SYSTEM PROVIDED HISTORY: pain TECHNOLOGIST PROVIDED HISTORY: pain Ordering Physician Provided Reason for Exam: pt twisted wrong, lt knee pain, unable to straighten knee. Acuity: Acute Type of Exam: Initial FINDINGS: Left femur, four views: The bones are diffusely osteopenic. No acute fracture deformity. The hip joint is maintained. The femoral head projects within the acetabulum. No focal soft tissue abnormality is seen. Left knee, two views: The knee is flexed. No acute osseous abnormality. No joint effusion. Joint spaces are not optimally profiled but no significant arthritic changes are apparent. Left tib-fib, three views: There is evidence of a remote healed distal tibia fracture. No acute fracture or dislocation is seen.   No focal soft tissue abnormality. No acute osseous abnormality of the left femur. No acute osseous abnormality of the left knee. No acute osseous abnormality of the left tib-fib. Healed remote distal tibia fracture. Marked osteopenia, likely due to disuse. Xr Knee Left (1-2 Views)    Result Date: 3/27/2019  EXAMINATION: 4 XRAY VIEWS OF THE LEFT FEMUR; 2 XRAY VIEWS OF THE LEFT KNEE; 3 XRAY VIEWS OF THE LEFT TIBIA AND FIBULA 3/27/2019 7:00 pm COMPARISON: Left hip 11/14/2015. HISTORY: ORDERING SYSTEM PROVIDED HISTORY: pain TECHNOLOGIST PROVIDED HISTORY: pain Ordering Physician Provided Reason for Exam: pt twisted wrong, lt knee pain, unable to straighten knee. Acuity: Acute Type of Exam: Initial FINDINGS: Left femur, four views: The bones are diffusely osteopenic. No acute fracture deformity. The hip joint is maintained. The femoral head projects within the acetabulum. No focal soft tissue abnormality is seen. Left knee, two views: The knee is flexed. No acute osseous abnormality. No joint effusion. Joint spaces are not optimally profiled but no significant arthritic changes are apparent. Left tib-fib, three views: There is evidence of a remote healed distal tibia fracture. No acute fracture or dislocation is seen. No focal soft tissue abnormality. No acute osseous abnormality of the left femur. No acute osseous abnormality of the left knee. No acute osseous abnormality of the left tib-fib. Healed remote distal tibia fracture. Marked osteopenia, likely due to disuse. Xr Knee Left (3 Views)    Result Date: 3/30/2019  EXAMINATION: 3 XRAY VIEWS OF THE LEFT KNEE 3/30/2019 10:58 am COMPARISON: March 27, 2018 HISTORY: ORDERING SYSTEM PROVIDED HISTORY: F/u L knee pain after cast TECHNOLOGIST PROVIDED HISTORY: AP, oblique, lateral F/u L knee pain after cast FINDINGS: Marked osteopenia. Interval casting, which degrades fine osseous detail question cortical offset in the lateral femoral metaphysis.   No pylorus. Partial gastric outlet obstruction is not excluded. 4. Bilateral dilated ureters without obstructing calculi. Urinary bladder is markedly distended with bladder wall air suggesting emphysematous cystitis. Dilatation of the ureters may be related to reflux or infection. 5. Atherosclerotic disease. 6. Other findings as above. Critical results were called by Dr. Tracie Hwang MD to [address] on 4/11/2019 at 17:37. Xr Chest Portable    Result Date: 4/12/2019  EXAMINATION: SINGLE XRAY VIEW OF THE CHEST 4/12/2019 5:26 am COMPARISON: November 14, 2015. HISTORY: ORDERING SYSTEM PROVIDED HISTORY: line placement TECHNOLOGIST PROVIDED HISTORY: line placement Ordering Physician Provided Reason for Exam: New right side line placement. Acuity: Acute Type of Exam: Initial Additional signs and symptoms: New right side line placement. FINDINGS: Stable left pectoral trans venous cardiac pacer device. New right IJ central venous catheter with tip near the superior atrial caval junction. Normal lung volume. No new consolidation. Curvilinear radiopacity projecting over the right upper lobe likely represents artifact from a skin fold. No pleural effusion or pneumothorax. Stable cardiomediastinal silhouette and great vessels with redemonstration of atherosclerotic thoracic aorta. New right IJ central venous catheter with tip near the superior atrial caval junction. No pneumothorax. No new consolidation. Physical Examination:        Physical Exam   Constitutional: She appears well-developed. She appears cachectic. She appears ill. No distress. HENT:   Head: Normocephalic and atraumatic. Eyes: Pupils are equal, round, and reactive to light. Conjunctivae and EOM are normal.   Neck: Normal range of motion. Right IJ central line in place. Site clean and dry. Cardiovascular: Normal rate, regular rhythm, normal heart sounds and intact distal pulses. Exam reveals no gallop and no friction rub.    No

## 2019-04-13 NOTE — FLOWSHEET NOTE
04/13/19 1219   Encounter Summary   Services provided to: Patient   Referral/Consult From: Ольга   Continue Visiting   (4/13/19V)   Volunteer Visit Yes   Complexity of Encounter Low   Length of Encounter 15 minutes   Routine   Type Follow up   Spiritual/Alevism   Type Spiritual support   Intervention Communion;Prayer   Sacraments   Communion Patient received communion

## 2019-04-13 NOTE — CARE COORDINATION
DISCHARGE PLANNING NOTE:    Patient states she is unsure at this time whether she wants to return to [address] [place] [address] [address] or go home. PT recs home with assist at this time. Will continue to evaluate this daily. Remains on IV levaquin, IV merrem, IV Vanco and IV levophed. Will continue to follow along with LSW.      Electronically signed by Hugo Bustamante RN on 4/13/2019 at 4:23 PM

## 2019-04-13 NOTE — PROGRESS NOTES
Discussed with nurse  Central line site clean and dry  Abdominal pain improving.  Patient had enema yesterday    Will follow as needed

## 2019-04-13 NOTE — PROGRESS NOTES
Resident at bedside to evaluate Pt's low BP. 1 L NS bolus ordered. Pt having adequate urine output thus far at 400 mL. Pt very lethargic and at times hard to arouse.

## 2019-04-13 NOTE — PLAN OF CARE
Problem: Risk for Impaired Skin Integrity  Goal: Tissue integrity - skin and mucous membranes  Description  Structural intactness and normal physiological function of skin and  mucous membranes. 4/13/2019 0546 by Gabby Schwartz RN  Outcome: Ongoing  Note:   No area of skin breakdown noted. Reposition patient frequently to prevent skin breakdown   4/13/2019 0544 by Gabby Schwartz RN  Note:   No area of skin breakdown noted. Reposition patient frequently to prevent skin breakdown   4/12/2019 1637 by Vance iLzarraga RN  Outcome: Ongoing     Problem: Falls - Risk of:  Goal: Will remain free from falls  Description  Will remain free from falls  4/13/2019 0546 by Gabby Schwartz RN  Outcome: Ongoing  Note:   No attempt to get oob. Safe environment provided. Bed down and locked. Uses call light appropriately   4/13/2019 0544 by Gabby Schwartz RN  Note:   No attempt to get oob. Safe environment provided. Bed down and locked.  Uses call light appropriately   4/12/2019 1637 by Vance Lizarraga RN  Outcome: Ongoing     Problem: Infection, Septic Shock:  Goal: Will show no infection signs and symptoms  Description  Will show no infection signs and symptoms  4/12/2019 1637 by Vance Lizarraga RN  Outcome: Ongoing

## 2019-04-14 ENCOUNTER — APPOINTMENT (OUTPATIENT)
Dept: GENERAL RADIOLOGY | Age: 69
DRG: 853 | End: 2019-04-14
Payer: COMMERCIAL

## 2019-04-14 ENCOUNTER — APPOINTMENT (OUTPATIENT)
Dept: CT IMAGING | Age: 69
DRG: 853 | End: 2019-04-14
Payer: COMMERCIAL

## 2019-04-14 LAB
ALBUMIN SERPL-MCNC: 1.6 G/DL (ref 3.5–5.2)
ALBUMIN/GLOBULIN RATIO: ABNORMAL (ref 1–2.5)
ALLEN TEST: ABNORMAL
ALLEN TEST: ABNORMAL
ALP BLD-CCNC: 115 U/L (ref 35–104)
ALT SERPL-CCNC: 17 U/L (ref 5–33)
ANION GAP SERPL CALCULATED.3IONS-SCNC: 10 MMOL/L (ref 9–17)
AST SERPL-CCNC: 41 U/L
BILIRUB SERPL-MCNC: 0.48 MG/DL (ref 0.3–1.2)
BUN BLDV-MCNC: 8 MG/DL (ref 8–23)
BUN/CREAT BLD: ABNORMAL (ref 9–20)
CALCIUM SERPL-MCNC: 6.8 MG/DL (ref 8.6–10.4)
CARBOXYHEMOGLOBIN: 0.5 % (ref 0–5)
CARBOXYHEMOGLOBIN: 0.7 % (ref 0–5)
CHLORIDE BLD-SCNC: 102 MMOL/L (ref 98–107)
CO2: 19 MMOL/L (ref 20–31)
CREAT SERPL-MCNC: <0.4 MG/DL (ref 0.5–0.9)
DIRECT EXAM: NORMAL
DIRECT EXAM: NORMAL
FIO2: 70
FIO2: ABNORMAL
GFR AFRICAN AMERICAN: ABNORMAL ML/MIN
GFR NON-AFRICAN AMERICAN: ABNORMAL ML/MIN
GFR SERPL CREATININE-BSD FRML MDRD: ABNORMAL ML/MIN/{1.73_M2}
GFR SERPL CREATININE-BSD FRML MDRD: ABNORMAL ML/MIN/{1.73_M2}
GLUCOSE BLD-MCNC: 111 MG/DL (ref 70–99)
GLUCOSE BLD-MCNC: 121 MG/DL (ref 65–105)
GLUCOSE BLD-MCNC: 132 MG/DL (ref 65–105)
HCO3 ARTERIAL: 21.3 MMOL/L (ref 22–26)
HCO3 ARTERIAL: 21.4 MMOL/L (ref 22–26)
HCT VFR BLD CALC: 21.2 % (ref 36–46)
HCT VFR BLD CALC: 22 % (ref 36–46)
HCT VFR BLD CALC: 22.8 % (ref 36–46)
HCT VFR BLD CALC: 26.6 % (ref 36–46)
HCT VFR BLD CALC: 27.5 % (ref 36–46)
HEMOGLOBIN: 7.1 G/DL (ref 12–16)
HEMOGLOBIN: 7.3 G/DL (ref 12–16)
HEMOGLOBIN: 7.6 G/DL (ref 12–16)
HEMOGLOBIN: 9 G/DL (ref 12–16)
HEMOGLOBIN: 9.1 G/DL (ref 12–16)
INR BLD: 2
LACTIC ACID: 2.1 MMOL/L (ref 0.5–2.2)
Lab: NORMAL
Lab: NORMAL
MAGNESIUM: 1.4 MG/DL (ref 1.6–2.6)
MCH RBC QN AUTO: 29.1 PG (ref 26–34)
MCHC RBC AUTO-ENTMCNC: 33.4 G/DL (ref 31–37)
MCV RBC AUTO: 87.1 FL (ref 80–100)
METHEMOGLOBIN: 0.4 % (ref 0–1.9)
METHEMOGLOBIN: 0.5 % (ref 0–1.9)
MODE: ABNORMAL
MODE: ABNORMAL
NEGATIVE BASE EXCESS, ART: 2.6 MMOL/L (ref 0–2)
NEGATIVE BASE EXCESS, ART: 3.4 MMOL/L (ref 0–2)
NOTIFICATION TIME: ABNORMAL
NOTIFICATION TIME: ABNORMAL
NOTIFICATION: ABNORMAL
NOTIFICATION: ABNORMAL
NRBC AUTOMATED: ABNORMAL PER 100 WBC
O2 DEVICE/FLOW/%: ABNORMAL
O2 DEVICE/FLOW/%: ABNORMAL
O2 SAT, ARTERIAL: 91.4 % (ref 95–98)
O2 SAT, ARTERIAL: 95.3 % (ref 95–98)
OXYHEMOGLOBIN: ABNORMAL % (ref 95–98)
OXYHEMOGLOBIN: ABNORMAL % (ref 95–98)
PATHOLOGIST REVIEW: NORMAL
PATIENT TEMP: 37
PATIENT TEMP: 37
PCO2 ARTERIAL: 31.2 MMHG (ref 35–45)
PCO2 ARTERIAL: 33.7 MMHG (ref 35–45)
PCO2, ART, TEMP ADJ: ABNORMAL (ref 35–45)
PCO2, ART, TEMP ADJ: ABNORMAL (ref 35–45)
PDW BLD-RTO: 13.5 % (ref 11.5–14.9)
PEEP/CPAP: 8
PEEP/CPAP: ABNORMAL
PH ARTERIAL: 7.41 (ref 7.35–7.45)
PH ARTERIAL: 7.45 (ref 7.35–7.45)
PH, ART, TEMP ADJ: ABNORMAL (ref 7.35–7.45)
PH, ART, TEMP ADJ: ABNORMAL (ref 7.35–7.45)
PLATELET # BLD: 268 K/UL (ref 150–450)
PMV BLD AUTO: 8.9 FL (ref 6–12)
PO2 ARTERIAL: 56.4 MMHG (ref 80–100)
PO2 ARTERIAL: 73 MMHG (ref 80–100)
PO2, ART, TEMP ADJ: ABNORMAL MMHG (ref 80–100)
PO2, ART, TEMP ADJ: ABNORMAL MMHG (ref 80–100)
POSITIVE BASE EXCESS, ART: ABNORMAL MMOL/L (ref 0–2)
POSITIVE BASE EXCESS, ART: ABNORMAL MMOL/L (ref 0–2)
POTASSIUM SERPL-SCNC: 2.9 MMOL/L (ref 3.7–5.3)
POTASSIUM SERPL-SCNC: 3.2 MMOL/L (ref 3.7–5.3)
PROTHROMBIN TIME: 22.7 SEC (ref 11.8–14.6)
PSV: ABNORMAL
PSV: ABNORMAL
PT. POSITION: ABNORMAL
PT. POSITION: ABNORMAL
RBC # BLD: 2.44 M/UL (ref 4–5.2)
RESPIRATORY RATE: 16
RESPIRATORY RATE: 30
SAMPLE SITE: ABNORMAL
SAMPLE SITE: ABNORMAL
SET RATE: 16
SET RATE: ABNORMAL
SODIUM BLD-SCNC: 131 MMOL/L (ref 135–144)
SPECIMEN DESCRIPTION: NORMAL
SPECIMEN DESCRIPTION: NORMAL
TEXT FOR RESPIRATORY: ABNORMAL
TEXT FOR RESPIRATORY: ABNORMAL
TOTAL HB: ABNORMAL G/DL (ref 12–16)
TOTAL HB: ABNORMAL G/DL (ref 12–16)
TOTAL PROTEIN: 3.9 G/DL (ref 6.4–8.3)
TOTAL RATE: 24
TOTAL RATE: ABNORMAL
VT: 450
VT: ABNORMAL
WBC # BLD: 27.5 K/UL (ref 3.5–11)

## 2019-04-14 PROCEDURE — 2580000003 HC RX 258: Performed by: INTERNAL MEDICINE

## 2019-04-14 PROCEDURE — 85610 PROTHROMBIN TIME: CPT

## 2019-04-14 PROCEDURE — 80053 COMPREHEN METABOLIC PANEL: CPT

## 2019-04-14 PROCEDURE — 94640 AIRWAY INHALATION TREATMENT: CPT

## 2019-04-14 PROCEDURE — 2580000003 HC RX 258: Performed by: STUDENT IN AN ORGANIZED HEALTH CARE EDUCATION/TRAINING PROGRAM

## 2019-04-14 PROCEDURE — 86900 BLOOD TYPING SEROLOGIC ABO: CPT

## 2019-04-14 PROCEDURE — 6360000002 HC RX W HCPCS: Performed by: INTERNAL MEDICINE

## 2019-04-14 PROCEDURE — 94762 N-INVAS EAR/PLS OXIMTRY CONT: CPT

## 2019-04-14 PROCEDURE — 2000000000 HC ICU R&B

## 2019-04-14 PROCEDURE — 87205 SMEAR GRAM STAIN: CPT

## 2019-04-14 PROCEDURE — 87070 CULTURE OTHR SPECIMN AEROBIC: CPT

## 2019-04-14 PROCEDURE — 85014 HEMATOCRIT: CPT

## 2019-04-14 PROCEDURE — 6360000002 HC RX W HCPCS: Performed by: NURSE PRACTITIONER

## 2019-04-14 PROCEDURE — 36600 WITHDRAWAL OF ARTERIAL BLOOD: CPT

## 2019-04-14 PROCEDURE — 2500000003 HC RX 250 WO HCPCS: Performed by: INTERNAL MEDICINE

## 2019-04-14 PROCEDURE — 2700000000 HC OXYGEN THERAPY PER DAY

## 2019-04-14 PROCEDURE — P9016 RBC LEUKOCYTES REDUCED: HCPCS

## 2019-04-14 PROCEDURE — 0BH17EZ INSERTION OF ENDOTRACHEAL AIRWAY INTO TRACHEA, VIA NATURAL OR ARTIFICIAL OPENING: ICD-10-PCS | Performed by: INTERNAL MEDICINE

## 2019-04-14 PROCEDURE — 84132 ASSAY OF SERUM POTASSIUM: CPT

## 2019-04-14 PROCEDURE — 6360000002 HC RX W HCPCS: Performed by: STUDENT IN AN ORGANIZED HEALTH CARE EDUCATION/TRAINING PROGRAM

## 2019-04-14 PROCEDURE — 82805 BLOOD GASES W/O2 SATURATION: CPT

## 2019-04-14 PROCEDURE — 36415 COLL VENOUS BLD VENIPUNCTURE: CPT

## 2019-04-14 PROCEDURE — 83735 ASSAY OF MAGNESIUM: CPT

## 2019-04-14 PROCEDURE — 6370000000 HC RX 637 (ALT 250 FOR IP): Performed by: INTERNAL MEDICINE

## 2019-04-14 PROCEDURE — 71045 X-RAY EXAM CHEST 1 VIEW: CPT

## 2019-04-14 PROCEDURE — 6360000004 HC RX CONTRAST MEDICATION: Performed by: SURGERY

## 2019-04-14 PROCEDURE — 82947 ASSAY GLUCOSE BLOOD QUANT: CPT

## 2019-04-14 PROCEDURE — 97110 THERAPEUTIC EXERCISES: CPT

## 2019-04-14 PROCEDURE — 5A1955Z RESPIRATORY VENTILATION, GREATER THAN 96 CONSECUTIVE HOURS: ICD-10-PCS | Performed by: INTERNAL MEDICINE

## 2019-04-14 PROCEDURE — 36592 COLLECT BLOOD FROM PICC: CPT

## 2019-04-14 PROCEDURE — 94002 VENT MGMT INPAT INIT DAY: CPT

## 2019-04-14 PROCEDURE — 85018 HEMOGLOBIN: CPT

## 2019-04-14 PROCEDURE — 74018 RADEX ABDOMEN 1 VIEW: CPT

## 2019-04-14 PROCEDURE — 85027 COMPLETE CBC AUTOMATED: CPT

## 2019-04-14 PROCEDURE — 99233 SBSQ HOSP IP/OBS HIGH 50: CPT | Performed by: INTERNAL MEDICINE

## 2019-04-14 PROCEDURE — 74176 CT ABD & PELVIS W/O CONTRAST: CPT

## 2019-04-14 PROCEDURE — 83605 ASSAY OF LACTIC ACID: CPT

## 2019-04-14 RX ORDER — ETOMIDATE 2 MG/ML
INJECTION INTRAVENOUS
Status: COMPLETED | OUTPATIENT
Start: 2019-04-14 | End: 2019-04-14

## 2019-04-14 RX ORDER — MINERAL OIL AND WHITE PETROLATUM 150; 830 MG/G; MG/G
OINTMENT OPHTHALMIC EVERY 4 HOURS
Status: DISCONTINUED | OUTPATIENT
Start: 2019-04-14 | End: 2019-04-21

## 2019-04-14 RX ORDER — CHLORHEXIDINE GLUCONATE 0.12 MG/ML
15 RINSE ORAL 2 TIMES DAILY
Status: DISCONTINUED | OUTPATIENT
Start: 2019-04-14 | End: 2019-04-21

## 2019-04-14 RX ORDER — METHYLPREDNISOLONE SODIUM SUCCINATE 125 MG/2ML
60 INJECTION, POWDER, LYOPHILIZED, FOR SOLUTION INTRAMUSCULAR; INTRAVENOUS EVERY 6 HOURS
Status: DISCONTINUED | OUTPATIENT
Start: 2019-04-14 | End: 2019-04-15

## 2019-04-14 RX ORDER — SODIUM CHLORIDE, SODIUM LACTATE, POTASSIUM CHLORIDE, AND CALCIUM CHLORIDE .6; .31; .03; .02 G/100ML; G/100ML; G/100ML; G/100ML
1000 INJECTION, SOLUTION INTRAVENOUS ONCE
Status: COMPLETED | OUTPATIENT
Start: 2019-04-14 | End: 2019-04-14

## 2019-04-14 RX ORDER — FENTANYL CITRATE 50 UG/ML
50 INJECTION, SOLUTION INTRAMUSCULAR; INTRAVENOUS EVERY 30 MIN PRN
Status: DISCONTINUED | OUTPATIENT
Start: 2019-04-14 | End: 2019-04-21

## 2019-04-14 RX ORDER — PROPOFOL 10 MG/ML
10 INJECTION, EMULSION INTRAVENOUS CONTINUOUS
Status: DISCONTINUED | OUTPATIENT
Start: 2019-04-14 | End: 2019-04-21

## 2019-04-14 RX ORDER — POLYVINYL ALCOHOL 14 MG/ML
1 SOLUTION/ DROPS OPHTHALMIC EVERY 4 HOURS
Status: DISCONTINUED | OUTPATIENT
Start: 2019-04-14 | End: 2019-04-21

## 2019-04-14 RX ORDER — POTASSIUM CHLORIDE 7.45 MG/ML
10 INJECTION INTRAVENOUS
Status: COMPLETED | OUTPATIENT
Start: 2019-04-14 | End: 2019-04-14

## 2019-04-14 RX ADMIN — IOHEXOL 50 ML: 240 INJECTION, SOLUTION INTRATHECAL; INTRAVASCULAR; INTRAVENOUS; ORAL at 18:30

## 2019-04-14 RX ADMIN — MEROPENEM 1 G: 1 INJECTION, POWDER, FOR SOLUTION INTRAVENOUS at 16:43

## 2019-04-14 RX ADMIN — POTASSIUM CHLORIDE 10 MEQ: 7.46 INJECTION, SOLUTION INTRAVENOUS at 18:30

## 2019-04-14 RX ADMIN — MINERAL OIL AND WHITE PETROLATUM: 150; 830 OINTMENT OPHTHALMIC at 08:59

## 2019-04-14 RX ADMIN — POLYVINYL ALCOHOL 1 DROP: 14 SOLUTION/ DROPS OPHTHALMIC at 21:45

## 2019-04-14 RX ADMIN — POTASSIUM CHLORIDE 10 MEQ: 7.46 INJECTION, SOLUTION INTRAVENOUS at 05:50

## 2019-04-14 RX ADMIN — MINERAL OIL AND WHITE PETROLATUM: 150; 830 OINTMENT OPHTHALMIC at 11:59

## 2019-04-14 RX ADMIN — MAGNESIUM SULFATE HEPTAHYDRATE 1 G: 1 INJECTION, SOLUTION INTRAVENOUS at 06:34

## 2019-04-14 RX ADMIN — POTASSIUM CHLORIDE 10 MEQ: 7.46 INJECTION, SOLUTION INTRAVENOUS at 13:07

## 2019-04-14 RX ADMIN — MAGNESIUM SULFATE HEPTAHYDRATE 1 G: 1 INJECTION, SOLUTION INTRAVENOUS at 07:41

## 2019-04-14 RX ADMIN — SODIUM CHLORIDE, POTASSIUM CHLORIDE, SODIUM LACTATE AND CALCIUM CHLORIDE 1000 ML: 600; 310; 30; 20 INJECTION, SOLUTION INTRAVENOUS at 07:57

## 2019-04-14 RX ADMIN — FAMOTIDINE 20 MG: 10 INJECTION, SOLUTION INTRAVENOUS at 21:46

## 2019-04-14 RX ADMIN — POTASSIUM CHLORIDE 10 MEQ: 7.46 INJECTION, SOLUTION INTRAVENOUS at 11:59

## 2019-04-14 RX ADMIN — POTASSIUM CHLORIDE 10 MEQ: 7.46 INJECTION, SOLUTION INTRAVENOUS at 09:50

## 2019-04-14 RX ADMIN — MEROPENEM 1 G: 1 INJECTION, POWDER, FOR SOLUTION INTRAVENOUS at 00:55

## 2019-04-14 RX ADMIN — SODIUM CHLORIDE, POTASSIUM CHLORIDE, SODIUM LACTATE AND CALCIUM CHLORIDE: 600; 310; 30; 20 INJECTION, SOLUTION INTRAVENOUS at 17:59

## 2019-04-14 RX ADMIN — IPRATROPIUM BROMIDE 0.5 MG: 0.5 SOLUTION RESPIRATORY (INHALATION) at 10:57

## 2019-04-14 RX ADMIN — POTASSIUM CHLORIDE 10 MEQ: 7.46 INJECTION, SOLUTION INTRAVENOUS at 17:49

## 2019-04-14 RX ADMIN — LEVALBUTEROL 1.25 MG: 1.25 SOLUTION, CONCENTRATE RESPIRATORY (INHALATION) at 08:24

## 2019-04-14 RX ADMIN — IPRATROPIUM BROMIDE 0.5 MG: 0.5 SOLUTION RESPIRATORY (INHALATION) at 08:24

## 2019-04-14 RX ADMIN — NOREPINEPHRINE BITARTRATE 16 MCG/MIN: 1 INJECTION INTRAVENOUS at 11:59

## 2019-04-14 RX ADMIN — PROPOFOL 25 MCG/KG/MIN: 10 INJECTION, EMULSION INTRAVENOUS at 17:51

## 2019-04-14 RX ADMIN — CHLORHEXIDINE GLUCONATE 0.12% ORAL RINSE 15 ML: 1.2 LIQUID ORAL at 09:03

## 2019-04-14 RX ADMIN — MEROPENEM 1 G: 1 INJECTION, POWDER, FOR SOLUTION INTRAVENOUS at 08:58

## 2019-04-14 RX ADMIN — FAMOTIDINE 20 MG: 10 INJECTION, SOLUTION INTRAVENOUS at 08:58

## 2019-04-14 RX ADMIN — LEVALBUTEROL 1.25 MG: 1.25 SOLUTION, CONCENTRATE RESPIRATORY (INHALATION) at 03:49

## 2019-04-14 RX ADMIN — IPRATROPIUM BROMIDE 0.5 MG: 0.5 SOLUTION RESPIRATORY (INHALATION) at 22:22

## 2019-04-14 RX ADMIN — CHLORHEXIDINE GLUCONATE 0.12% ORAL RINSE 15 ML: 1.2 LIQUID ORAL at 21:48

## 2019-04-14 RX ADMIN — IPRATROPIUM BROMIDE 0.5 MG: 0.5 SOLUTION RESPIRATORY (INHALATION) at 15:04

## 2019-04-14 RX ADMIN — POLYVINYL ALCOHOL 1 DROP: 14 SOLUTION/ DROPS OPHTHALMIC at 08:58

## 2019-04-14 RX ADMIN — ETOMIDATE 20 MG: 2 INJECTION, SOLUTION INTRAVENOUS at 07:24

## 2019-04-14 RX ADMIN — POTASSIUM CHLORIDE 10 MEQ: 7.46 INJECTION, SOLUTION INTRAVENOUS at 08:59

## 2019-04-14 RX ADMIN — Medication 10 ML: at 21:47

## 2019-04-14 RX ADMIN — METHYLPREDNISOLONE SODIUM SUCCINATE 60 MG: 125 INJECTION, POWDER, FOR SOLUTION INTRAMUSCULAR; INTRAVENOUS at 21:49

## 2019-04-14 RX ADMIN — LEVOFLOXACIN 500 MG: 5 INJECTION, SOLUTION INTRAVENOUS at 13:07

## 2019-04-14 RX ADMIN — LEVALBUTEROL 1.25 MG: 1.25 SOLUTION, CONCENTRATE RESPIRATORY (INHALATION) at 22:22

## 2019-04-14 RX ADMIN — METHYLPREDNISOLONE SODIUM SUCCINATE 60 MG: 125 INJECTION, POWDER, FOR SOLUTION INTRAMUSCULAR; INTRAVENOUS at 08:58

## 2019-04-14 RX ADMIN — METHYLPREDNISOLONE SODIUM SUCCINATE 60 MG: 125 INJECTION, POWDER, FOR SOLUTION INTRAMUSCULAR; INTRAVENOUS at 14:22

## 2019-04-14 RX ADMIN — POTASSIUM CHLORIDE 10 MEQ: 7.46 INJECTION, SOLUTION INTRAVENOUS at 07:04

## 2019-04-14 RX ADMIN — PROPOFOL 15 MCG/KG/MIN: 10 INJECTION, EMULSION INTRAVENOUS at 07:52

## 2019-04-14 RX ADMIN — VANCOMYCIN HYDROCHLORIDE 750 MG: 5 INJECTION, POWDER, LYOPHILIZED, FOR SOLUTION INTRAVENOUS at 11:50

## 2019-04-14 RX ADMIN — MINERAL OIL AND WHITE PETROLATUM: 150; 830 OINTMENT OPHTHALMIC at 21:45

## 2019-04-14 RX ADMIN — POLYVINYL ALCOHOL 1 DROP: 14 SOLUTION/ DROPS OPHTHALMIC at 11:59

## 2019-04-14 RX ADMIN — LEVALBUTEROL 1.25 MG: 1.25 SOLUTION, CONCENTRATE RESPIRATORY (INHALATION) at 10:58

## 2019-04-14 RX ADMIN — LEVALBUTEROL 1.25 MG: 1.25 SOLUTION, CONCENTRATE RESPIRATORY (INHALATION) at 15:04

## 2019-04-14 ASSESSMENT — PULMONARY FUNCTION TESTS
PIF_VALUE: 12
PIF_VALUE: 13
PIF_VALUE: 12
PIF_VALUE: 13
PIF_VALUE: 26
PIF_VALUE: 14
PIF_VALUE: 12
PIF_VALUE: 17
PIF_VALUE: 15
PIF_VALUE: 17
PIF_VALUE: 19
PIF_VALUE: 23
PIF_VALUE: 22
PIF_VALUE: 15
PIF_VALUE: 18
PIF_VALUE: 16
PIF_VALUE: 14
PIF_VALUE: 13
PIF_VALUE: 16
PIF_VALUE: 23
PIF_VALUE: 15
PIF_VALUE: 11
PIF_VALUE: 16
PIF_VALUE: 15
PIF_VALUE: 17
PIF_VALUE: 18
PIF_VALUE: 17
PIF_VALUE: 17
PIF_VALUE: 14
PIF_VALUE: 17
PIF_VALUE: 19
PIF_VALUE: 14
PIF_VALUE: 12
PIF_VALUE: 13
PIF_VALUE: 14
PIF_VALUE: 15
PIF_VALUE: 16
PIF_VALUE: 24
PIF_VALUE: 12
PIF_VALUE: 15
PIF_VALUE: 17
PIF_VALUE: 15
PIF_VALUE: 16
PIF_VALUE: 19
PIF_VALUE: 13
PIF_VALUE: 15
PIF_VALUE: 14
PIF_VALUE: 13
PIF_VALUE: 13
PIF_VALUE: 16
PIF_VALUE: 14

## 2019-04-14 ASSESSMENT — PAIN SCALES - GENERAL
PAINLEVEL_OUTOF10: 0

## 2019-04-14 NOTE — PROGRESS NOTES
Patient successfully intubated and on ventilator. OG placed and received back copious amounts of black/brown contents from the stomach. No distress noted a this time. Will continue to monitor.

## 2019-04-14 NOTE — PROGRESS NOTES
ICU Progress Note (Vent)   Pulmonary and Critical Care Specialists    Patient - Sue Kehr,  Age - 77 y.o.    - 1950      Room Number -    N -  [id number]   [facility]  Date of Admission -  2019  2:48 PM    Events of Past 24 Hours   Overnight, patient became more hypoxic had to be placed on her percent nonrebreather. She started moaning and was responsive only to name one I saw her this a.m. She was tachycardic and tachypneic. Decision made to intubate her. At the time of intubation dark brown bilious fluid was seen in the posterior pharyngeal cavity. This was suctioned and patient was intubated without difficulty. However, after intubation and the cuff was inflated patient had vomitus. Vitals    height is 5' (1.524 m) and weight is 126 lb (57.2 kg). Her axillary temperature is 99 °F (37.2 °C). Her blood pressure is 105/59 (abnormal) and her pulse is 121. Her respiration is 30 and oxygen saturation is 91%. Temperature Range: Temp: 99 °F (37.2 °C) Temp  Av.2 °F (36.8 °C)  Min: 97.5 °F (36.4 °C)  Max: 99 °F (37.2 °C)  BP Range:  Systolic (89FTV), LLD:48 , Min:77 , SBJ:097     Diastolic (99NFV), NNT:29, Min:35, Max:70    Pulse Range: Pulse  Av.5  Min: 104  Max: 136  Respiration Range: Resp  Av.7  Min: 12  Max: 35  Current Pulse Ox[de-identified]  SpO2: 91 %  24HR Pulse Ox Range:  SpO2  Av.3 %  Min: 76 %  Max: 97 %  Oxygen Amount and Delivery: O2 Flow Rate (L/min): 15 L/min      Wt Readings from Last 3 Encounters:   19 126 lb (57.2 kg)   19 89 lb (40.4 kg)   19 94 lb 1.6 oz (42.7 kg)     I/O       Intake/Output Summary (Last 24 hours) at 2019 0768  Last data filed at 2019 0636  Gross per 24 hour   Intake 3302.22 ml   Output 2500 ml   Net 802.22 ml     I/O last 3 completed shifts:   In: 3302.2 [I.V.:2752.2; IV Piggyback:550]  Out: 2500 [Urine:2500]     DRAIN/TUBE OUTPUT:     Invasive Lines   ETT Day -   1  Lines - right IJ day  2  ICP PRESSURE RANGE:  No data recorded  CVP PRESSURE RANGE:  No data recorded  Mechanical Ventilation Data   SETTINGS (Comprehensive)  Vent Information  Vt Ordered: 450 mL  Rate Set: 16 bmp  FiO2 : 80 %  Sensitivity: 5  PEEP/CPAP: 8  I Time/ I Time %: 0.9 s  Additional Respiratory  Assessments  Pulse: 121  Resp: 30  SpO2: 91 %  Oral Care: Lip moisturizer applied       ABGs:   Lab Results   Component Value Date    PHART 7.446 04/14/2019    PO2ART 56.4 04/14/2019    NOX3MMV 31.2 04/14/2019       Lab Results   Component Value Date    MODE NOT REPORTED 04/14/2019         Medications   IV   propofol      vasopressin (Septic Shock) infusion Stopped (04/14/19 0703)    norepinephrine 14 mcg/min (04/13/19 1117)    lactated ringers 100 mL/hr at 04/13/19 6597    dextrose        polyvinyl alcohol  1 drop Both Eyes Q4H    And    lubrifresh P.M. Both Eyes Q4H    chlorhexidine  15 mL Mouth/Throat BID    ipratropium  0.5 mg Nebulization 4x daily    lactated ringers bolus  1,000 mL Intravenous Once    levalbuterol  1.25 mg Nebulization Q4H    docusate sodium  100 mg Oral TID    vancomycin  750 mg Intravenous Q24H    vancomycin (VANCOCIN) intermittent dosing (placeholder)   Other RX Placeholder    levofloxacin  500 mg Intravenous Q24H    meropenem  1 g Intravenous Q8H    famotidine (PEPCID) injection  20 mg Intravenous BID    insulin lispro  0-12 Units Subcutaneous TID WC    insulin lispro  0-6 Units Subcutaneous Nightly    venlafaxine  37.5 mg Oral TID    clopidogrel  75 mg Oral Daily    aspirin  81 mg Oral Daily    sodium chloride flush  10 mL Intravenous 2 times per day    enoxaparin  40 mg Subcutaneous Daily       Diet/Nutrition   Dietary Nutrition Supplements: Standard High Calorie Oral Supplement    Exam   VITALS    height is 5' (1.524 m) and weight is 126 lb (57.2 kg). Her axillary temperature is 99 °F (37.2 °C). Her blood pressure is 105/59 (abnormal) and her pulse is 121.  Her respiration is 30 and oxygen saturation is 91%. Ventilator Settings (Basic)    Rate Set: 16 bmp/Vt Ordered: 450 mL/ /FiO2 : 80 %    Constitutional - Sedated  General Appearance thin and frail   HEENT - Life support devices in place (ET, OG),normocephalic, atraumatic. PERRLA  Lungs - Chest expands equally, no wheezes, rales or rhonchi. Cardiovascular - Heart sounds are normal. Tachycardicl rate and rhythm regular, no murmur, gallop or rub. Abdomen -  nontender, distended, no masses or organomegaly  Neurologic - CN II-XII are grossly intact.  There are no focal motor deficits  Skin - no bruising or bleeding  Extremities - no cyanosis, clubbing, left lower extremity in cast, left upper extremity edema      Lab Results   CBC     Lab Results   Component Value Date    WBC 27.5 04/14/2019    RBC 2.44 04/14/2019    RBC 3.66 05/23/2012    HGB 7.1 04/14/2019    HCT 21.2 04/14/2019     04/14/2019     05/23/2012    MCV 87.1 04/14/2019    MCH 29.1 04/14/2019    MCHC 33.4 04/14/2019    RDW 13.5 04/14/2019    METASPCT 2 04/11/2019    LYMPHOPCT 4 04/11/2019    MONOPCT 2 04/11/2019    BASOPCT 0 04/11/2019    MONOSABS 0.96 04/11/2019    LYMPHSABS 1.92 04/11/2019    EOSABS 0.00 04/11/2019    BASOSABS 0.00 04/11/2019    DIFFTYPE NOT REPORTED 04/11/2019       BMP   Lab Results   Component Value Date     04/14/2019    K 2.9 04/14/2019     04/14/2019    CO2 19 04/14/2019    BUN 8 04/14/2019    CREATININE <0.40 04/14/2019    GLUCOSE 111 04/14/2019    GLUCOSE 87 05/21/2012    CALCIUM 6.8 04/14/2019       LFTS  Lab Results   Component Value Date    ALKPHOS 115 04/14/2019    ALT 17 04/14/2019    AST 41 04/14/2019    PROT 3.9 04/14/2019    BILITOT 0.48 04/14/2019    BILIDIR 0.21 05/17/2012    IBILI 0.36 05/17/2012    LABALBU 1.6 04/14/2019    LABALBU 4.6 05/17/2012       INR  Recent Labs     04/12/19  0231 04/13/19  0314 04/14/19  0439   PROTIME 21.8* 23.7* 22.7*   INR 1.9 2.1 2.0       APTT  No results for patient's SAL Bustos who is her brother Mya Schmidt has a speech disability due to an old stroke, but he understands everything, and his wife is always on the phone with him to help him express himself) ph [phone]. He lives in [place] so there is a time change differential   I expressed to Leyda Fenton and his wife that the patient had taken an acute turn for the worse and she is in critical condition area and they express understanding of the current situation.       Critical Care Time   65 min-not including intubation    Electronically signed by Dat Long MD on 4/14/2019 at 7:41 AM

## 2019-04-14 NOTE — PROGRESS NOTES
Physical Therapy  Facility/Department: [facility] ICU  Daily Treatment Note  NAME: Sukhjinder Reilly  : 1950  MRN: [id number]    Date of Service: 2019    Discharge Recommendations:  ECF with PT   PT Equipment Recommendations  Equipment Needed: No    Patient Diagnosis(es): The primary encounter diagnosis was Urinary retention. Diagnoses of Emphysematous cystitis and Septicemia ([address] were also pertinent to this visit. has a past medical history of Abnormal computed tomography of cervical spine, CVA (cerebral vascular accident) ([address] GERD (gastroesophageal reflux disease), Hypertension, Paraproteinemia, and Weight loss. has a past surgical history that includes pacemaker placement (2011) and intubation (2019).     Restrictions  Restrictions/Precautions  Restrictions/Precautions: Fall Risk  Implants present? : Pacemaker  Position Activity Restriction  Other position/activity restrictions: LLE in a long leg cast  Subjective   Subjective  Subjective: patient intubated to day,RN said patient was having difficulty breathing,okay to do ROM ex in ed  Pain Screening  Patient Currently in Pain: No  Vital Signs  Patient Currently in Pain: No       Orientation  Orientation  Overall Orientation Status: Impaired  Orientation Level: Unable to assess  Cognition    unable to assess  Objective   Bed mobility  Rolling to Left: Unable to assess  Rolling to Right: Unable to assess  Sit to Supine: Unable to assess  Scooting: Unable to assess  Transfers  Sit to Stand: Unable to assess  Stand to sit: Unable to assess  Ambulation  Ambulation?: No  Stairs/Curb  Stairs?: No        Exercises  Comments: PROM ex to all 4's 10x      Assessment   REQUIRES PT FOLLOW UP: Yes  Activity Tolerance  Activity Tolerance: Treatment limited secondary to medical complications (free text)     AM-PAC Score  AM-PAC Inpatient Mobility without Stair Climbing Raw Score : 5  AM-PAC Inpatient without Stair Climbing T-Scale Score : 23.59  Mobility Inpatient CMS 0-100% Score: 100  Mobility Inpatient without Stair CMS G-Code Modifier : CN       Goals  Short term goals  Time Frame for Short term goals: 10 visits  Short term goal 1: improve strength RUE/RLE to 4/5 to assist in bed mobility and transfers  Short term goal 2: improve bed mobility to minx1  Short term goal 3: improve transfers to minx1  Short term goal 4: progress to Gait and/or use of wheelchair for mobility  Patient Goals   Patient goals : return home    Plan    Plan  Times per week: 10 visits  Times per day: Daily  Specific instructions for Next Treatment: progress with ex as allowed  Current Treatment Recommendations: Strengthening, ROM, Functional Mobility Training, Home Exercise Program  Plan Comment: to continue PT per POC  Safety Devices  Type of devices: Left in bed, Call light within reach  Restraints  Initially in place: Yes  Restraints: B wrist restraint     Therapy Time   Individual Concurrent Group Co-treatment   Time In 0915         Time Out 0930         Minutes 15         Timed Code Treatment Minutes: 15 Minutes     Electronically signed by: Chandler Aguilar, PT

## 2019-04-14 NOTE — PROGRESS NOTES
[facility]    PROGRESS NOTE             4/14/2019    8:34 AM    Name:   Angelika Jay  MRN:     [id number]     Acct:      [de-identified]   Room:   2002/2002-01  IP Day:  3  Admit Date:  4/11/2019  2:48 PM    PCP:  Erinn Tilley DO  Code Status:  Full Code    Subjective:     C/C:   Chief Complaint   Patient presents with    Abdominal Pain     Interval History Status: worsened. Patient seen and examined. Overnight history obtained from nursing staff. Patient started developing some dizziness this morning, ABGs showed a pO2 of 56.4. Patient was subsequently intubated and placed on ventilator. Upon intubation pt was found to have blackish brown vomitus in the posterior pharyngeal cavity. CXR this am shows an increasing L pleural effusion. Lactic acid wnl  NG tube placed  On pressors. Brief History:     Per EMR:     The patient is a 77 y.o.  Female who presents withAbdominal Pain   and she is admitted to the hospital for the management of abdominal distension, nausea, vomiting, and acute cystitis.      Patient presents with chills, nausea, vomiting, abdominal pain (diffuse, but worse in the suprapubic region), abdominal distension, and dysuria of 2-3 days duration. Denies hematemesis. Acknowledges difficulty keeping food down but tolerating fluids. States abdominal pain is a 6/10 on average, and denies trying any medication to alleviate her sx.      Denies recent antibiotic use or steroid use.      ED: Tachycardic 100-114 BP, WBC 47.9 w/neutrophils 88,  UCx from 4/2 + E. Coli > 100,000 w/suceptibility to cipro. Review of Systems:     Review of Systems   Unable to perform ROS: Intubated         Medications: Allergies:     Allergies   Allergen Reactions    Penicillins Shortness Of Breath and Rash    Amoxicillin        Current Meds:   Scheduled Meds:    polyvinyl alcohol  1 drop Both Eyes Q4H    And    lubrifresh acute osseous abnormality of the left tib-fib. Healed remote distal tibia fracture. Marked osteopenia, likely due to disuse. Xr Knee Left (1-2 Views)    Result Date: 3/27/2019  EXAMINATION: 4 XRAY VIEWS OF THE LEFT FEMUR; 2 XRAY VIEWS OF THE LEFT KNEE; 3 XRAY VIEWS OF THE LEFT TIBIA AND FIBULA 3/27/2019 7:00 pm COMPARISON: Left hip 11/14/2015. HISTORY: ORDERING SYSTEM PROVIDED HISTORY: pain TECHNOLOGIST PROVIDED HISTORY: pain Ordering Physician Provided Reason for Exam: pt twisted wrong, lt knee pain, unable to straighten knee. Acuity: Acute Type of Exam: Initial FINDINGS: Left femur, four views: The bones are diffusely osteopenic. No acute fracture deformity. The hip joint is maintained. The femoral head projects within the acetabulum. No focal soft tissue abnormality is seen. Left knee, two views: The knee is flexed. No acute osseous abnormality. No joint effusion. Joint spaces are not optimally profiled but no significant arthritic changes are apparent. Left tib-fib, three views: There is evidence of a remote healed distal tibia fracture. No acute fracture or dislocation is seen. No focal soft tissue abnormality. No acute osseous abnormality of the left femur. No acute osseous abnormality of the left knee. No acute osseous abnormality of the left tib-fib. Healed remote distal tibia fracture. Marked osteopenia, likely due to disuse. Xr Knee Left (3 Views)    Result Date: 3/30/2019  EXAMINATION: 3 XRAY VIEWS OF THE LEFT KNEE 3/30/2019 10:58 am COMPARISON: March 27, 2018 HISTORY: ORDERING SYSTEM PROVIDED HISTORY: F/u L knee pain after cast TECHNOLOGIST PROVIDED HISTORY: AP, oblique, lateral F/u L knee pain after cast FINDINGS: Marked osteopenia. Interval casting, which degrades fine osseous detail question cortical offset in the lateral femoral metaphysis. No malalignment identified. No significant joint effusion. Interval casting.   Question nondisplaced fracture in the lateral femoral metaphysis. Osteopenia. Xr Tibia Fibula Left (2 Views)    Result Date: 3/27/2019  EXAMINATION: 4 XRAY VIEWS OF THE LEFT FEMUR; 2 XRAY VIEWS OF THE LEFT KNEE; 3 XRAY VIEWS OF THE LEFT TIBIA AND FIBULA 3/27/2019 7:00 pm COMPARISON: Left hip 11/14/2015. HISTORY: ORDERING SYSTEM PROVIDED HISTORY: pain TECHNOLOGIST PROVIDED HISTORY: pain Ordering Physician Provided Reason for Exam: pt twisted wrong, lt knee pain, unable to straighten knee. Acuity: Acute Type of Exam: Initial FINDINGS: Left femur, four views: The bones are diffusely osteopenic. No acute fracture deformity. The hip joint is maintained. The femoral head projects within the acetabulum. No focal soft tissue abnormality is seen. Left knee, two views: The knee is flexed. No acute osseous abnormality. No joint effusion. Joint spaces are not optimally profiled but no significant arthritic changes are apparent. Left tib-fib, three views: There is evidence of a remote healed distal tibia fracture. No acute fracture or dislocation is seen. No focal soft tissue abnormality. No acute osseous abnormality of the left femur. No acute osseous abnormality of the left knee. No acute osseous abnormality of the left tib-fib. Healed remote distal tibia fracture. Marked osteopenia, likely due to disuse. Ct Abdomen Pelvis W Iv Contrast    Result Date: 4/11/2019  EXAMINATION: CT OF THE ABDOMEN AND PELVIS WITH CONTRAST 4/11/2019 5:14 pm TECHNIQUE: CT of the abdomen and pelvis was performed with the administration of intravenous contrast. Multiplanar reformatted images are provided for review. Dose modulation, iterative reconstruction, and/or weight based adjustment of the mA/kV was utilized to reduce the radiation dose to as low as reasonably achievable. COMPARISON: None.  HISTORY: ORDERING SYSTEM PROVIDED HISTORY: Abdominal pain TECHNOLOGIST PROVIDED HISTORY: IV Only Contrast Ordering Physician Provided Reason for Exam: patient c/o abd pain for an hour FINDINGS: Lower Chest: Trace pleural fluid bilaterally is noted. Indwelling cardiac pacemaker is present. Heart size is normal.  Coronary artery calcifications are evident. The esophagus is significantly dilated and fluid-filled. No paraesophageal adenopathy is evident. Organs: The spleen, pancreas, and adrenals are unremarkable. The liver contains hypodensities, likely cysts. The gallbladder is distended and contains a solitary gallstone. The kidneys excrete contrast bilaterally. Extrarenal collecting systems are noted. The ureters are mildly dilated down to the urinary bladder without evidence of intraluminal or ureteral obstructing calculi. GI/Bowel: Marked distention of the stomach is noted; this continues to the pylorus with appearance suggesting partial gastric outlet obstruction. Fluid is present in some small bowel loops and colon distal to the stomach. No small bowel obstruction. Pelvis: Marked distention of the urinary bladder is noted. 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 Distended ureters may be related to reflux or infection. No free pelvic fluid, pelvic or inguinal adenopathy is noted. Peritoneum/Retroperitoneum: No aortic aneurysm. Mild to moderate plaque is noted in the infrarenal abdominal aorta and both proximal iliac arteries. Shotty lymph nodes are present around the aorta in the upper abdomen. No mesenteric adenopathy is noted. Bones/Soft Tissues: Degenerative changes are present in the hips and lower lumbar facets. Estimated biologic radiation dose for this procedure:258.77 mGy/cm2.     1. Dilatation of the thoracic esophagus filled with fluid. No obstructive process is noted. No adjacent enlarged lymph nodes. 2. Trace pleural fluid. 3. Marked distention of the stomach. This appears to extend to the pylorus. Partial gastric outlet obstruction is not excluded. 4. Bilateral dilated ureters without obstructing calculi.   Urinary bladder She has no rales. Abdominal: Soft. Bowel sounds are normal. She exhibits no distension and no mass. Tenderness: Diffuse, present only with palpation. There is no rebound and no guarding. Musculoskeletal: Normal range of motion. She exhibits no edema. Left knee wrapped in dressing. Neurological:   Intubated, sedated   Skin: Skin is warm and dry. She is not diaphoretic. There is pallor. Assessment:        Primary Problem  Sepsis due to urinary tract infection [facility])    Active Hospital Problems    Diagnosis Date Noted    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]     Septic shock ([address] [A41.9, R65.21]     Leukemoid reaction [D72.823]     Aspiration pneumonia of right lower lobe due to vomit ([facility] [J69.0]     MRSA carrier [Z22.322]     Sepsis due to urinary tract infection ([address] [A41.9, N39.0] 04/11/2019    Cystitis [N30.90] 04/11/2019       Plan:           Septic shock 2/2 Emphysematous Cystitis w/retention   WBC 27.5 (36.9)   UCx from 4/2 + E. Coli    UCx from 4/11 + E. Coli 50-100K   UA + nitrites, large leuk est, many bacteria, mod Hb    BCx negative x 1 day   MRSA POSITIVE --> restarted vancomycin   CXR RLL PNA vs atelectasis   Milk of Molasses   Colace   CT Abd/Pelvis: Dilatation of thoracic esophagus filled w/ fluid, distension of stomach +/- partial gastric obstruction, b/l dilated ureters w/o obstructing caliculi w/markedly distended bladder suggesting emphysematous cystitis   Levophed, vasopressin   LR @ 100 cc/hr   Strict I/Os   ECHO 4/12: LVEF 45%, RVSP 36 mmHg   F/U PNA workup (legionella, mycoplasma)   Urology consult, appreciate recs --> cont cath until out of ICU and stable, possible repeat pelvic CT if unable to wean levophed   Critical care consult, appreciate recs   Gen Surg, right IJ central line placed on 4/12    Acute hypoxic resp failure 2/2 poss aspiration PNA   Intubated, sedated   Possible aspiration pneumonia   Worsening chest x-ray compared to yesterday   ABGs: pO2 56.4   On ABx, ID following   Pulm/CC following    Anemia, bleeding vs dilutional   Hb 7.6 (from 13.9 on 4/11)   H&H q8   Monitor for active bleeding   Transfuse 1 U RBC if Hb < 7   Hold lovenox given elevated INR   F/U FOCT    Maintenance   Lovenox   Dulera, Xopenex   Continue home meds trazodone, ASA, Plavix, Norvasc, Effexor, Spiriva     Dispo   PT/OT Eval and treat   Social work for [address]       Dai Valdivia MD  4/14/2019  8:34 AM           IM Attending    Pt seen and examined today   I have discussed the care of pt , including pertinent history and exam findings,  with the resident. I have reviewed the key elements of all parts of the encounter with the resident. I agree with the assessment, plan and orders as documented by the resident.     Pt with severe sepsis - septic shock needing pressors  Source uti   ID pulm plan noted     Electronically signed by Halley Hernandez MD on 4/14/2019 at 2:11 PM

## 2019-04-14 NOTE — FLOWSHEET NOTE
04/14/19 5332   Provider Notification   Reason for Communication Evaluate; Review case   Provider Name Dr Woodard Standing   Provider Notification Physician   Method of Communication Face to face   Response At bedside       Provider ordered for the patient to be intubated, will notify family.

## 2019-04-14 NOTE — PROGRESS NOTES
General Surgery Progress Note            PATIENT NAME: Erin Damon     TODAY'S DATE: 4/14/2019, 3:15 PM    SUBJECTIVE:    Pt  Seen and examined. Overnight events noted. OBJECTIVE:   VITALS:  BP (!) 130/58   Pulse 105   Temp 98.4 °F (36.9 °C) (Axillary)   Resp 20   Ht 5' (1.524 m)   Wt 126 lb (57.2 kg)   SpO2 97%   BMI 24.61 kg/m²      INTAKE/OUTPUT:      Intake/Output Summary (Last 24 hours) at 4/14/2019 1515  Last data filed at 4/14/2019 0636  Gross per 24 hour   Intake 3302.22 ml   Output 2100 ml   Net 1202.22 ml                 CONSTITUTIONAL:  awake and alert. No acute distress  HEART:   Regular, tachy  LUNGS:   diminished  ABDOMEN:   Abdomen soft, diffusely tender, mildly distended.  Hypoactive BS  EXTREMITIES:   No edema    Data:  CBC with Differential:    Lab Results   Component Value Date    WBC 27.5 04/14/2019    RBC 2.44 04/14/2019    RBC 3.66 05/23/2012    HGB 7.6 04/14/2019    HCT 22.8 04/14/2019     04/14/2019     05/23/2012    MCV 87.1 04/14/2019    MCH 29.1 04/14/2019    MCHC 33.4 04/14/2019    RDW 13.5 04/14/2019    METASPCT 2 04/11/2019    LYMPHOPCT 4 04/11/2019    MONOPCT 2 04/11/2019    BASOPCT 0 04/11/2019    MONOSABS 0.96 04/11/2019    LYMPHSABS 1.92 04/11/2019    EOSABS 0.00 04/11/2019    BASOSABS 0.00 04/11/2019    DIFFTYPE NOT REPORTED 04/11/2019     CMP:    Lab Results   Component Value Date     04/14/2019    K 2.9 04/14/2019     04/14/2019    CO2 19 04/14/2019    BUN 8 04/14/2019    CREATININE <0.40 04/14/2019    GFRAA CANNOT BE CALCULATED 04/14/2019    LABGLOM CANNOT BE CALCULATED 04/14/2019    GLUCOSE 111 04/14/2019    GLUCOSE 87 05/21/2012    PROT 3.9 04/14/2019    LABALBU 1.6 04/14/2019    LABALBU 4.6 05/17/2012    CALCIUM 6.8 04/14/2019    BILITOT 0.48 04/14/2019    ALKPHOS 115 04/14/2019    AST 41 04/14/2019    ALT 17 04/14/2019       Radiology Review:    KUB reviewed      ASSESSMENT     Principal Problem:    Sepsis due to urinary tract infection ([address]  Active Problems:    Cystitis    Emphysematous cystitis    Septic shock ([address]    Leukemoid reaction    Aspiration pneumonia of right lower lobe due to vomit ([address]    MRSA carrier  Resolved Problems:    * No resolved hospital problems. *  sepsis  ? Gastric outlet obstruction vs SBO vs ileus    Plan  1. Will obtain CT  2.  Continue supportive care

## 2019-04-14 NOTE — PLAN OF CARE
Problem: Falls - Risk of:  Goal: Will remain free from falls  Description  Will remain free from falls  4/14/2019 0803 by Joyce Ponce RN  Outcome: Ongoing  Note:   No attempt to get oob. Safe environment provided. Bed down and locked. Uses call light appropriately   4/13/2019 1820 by Leydi Ivory RN  Outcome: Ongoing     Problem: Restraint Use - Nonviolent/Non-Self-Destructive Behavior:  Goal: Absence of restraint indications  Description  Absence of restraint indications  Outcome: Ongoing  Note:   All tubes and lines remain intact. Pt reaches for ETT when restriants released.  Reassurance given

## 2019-04-14 NOTE — FLOWSHEET NOTE
04/14/19 1316   Provider Notification   Reason for Communication Evaluate; Review case   Provider Name Dr Sil Bell   Provider Notification Physician   Method of Communication Secure Message   Response No new orders       Provider notified of KUB impression. OG has already been placed with this mornings intubation.  No new orders

## 2019-04-14 NOTE — CARE COORDINATION
DISCHARGE PLANNING NOTE:    Patient was intubated this morning. She is from [facility] and as of yesterday she stated that she was not sure whether she wanted to return there or not. Will have to re-evaluate this once extubated. On IV merrem, IV levaquin, IV vanco.   Levo and vasopressin. Steroids 60Q6. Will continue to follow along with LSW.      Electronically signed by Leigh Ann Lakhani RN on 4/14/2019 at 3:04 PM

## 2019-04-15 ENCOUNTER — APPOINTMENT (OUTPATIENT)
Dept: GENERAL RADIOLOGY | Age: 69
DRG: 853 | End: 2019-04-15
Payer: COMMERCIAL

## 2019-04-15 LAB
AFP: 1.8 UG/L
ALBUMIN SERPL-MCNC: 1.7 G/DL (ref 3.5–5.2)
ALBUMIN/GLOBULIN RATIO: ABNORMAL (ref 1–2.5)
ALLEN TEST: ABNORMAL
ALP BLD-CCNC: 126 U/L (ref 35–104)
ALT SERPL-CCNC: 20 U/L (ref 5–33)
ANION GAP SERPL CALCULATED.3IONS-SCNC: 8 MMOL/L (ref 9–17)
AST SERPL-CCNC: 48 U/L
BILIRUB SERPL-MCNC: 0.74 MG/DL (ref 0.3–1.2)
BUN BLDV-MCNC: 6 MG/DL (ref 8–23)
BUN/CREAT BLD: ABNORMAL (ref 9–20)
CA 125: 62 U/ML
CA 19-9: 49 U/ML (ref 0–35)
CALCIUM SERPL-MCNC: 7.1 MG/DL (ref 8.6–10.4)
CARBOXYHEMOGLOBIN: 0.4 % (ref 0–5)
CARCINOEMBRYONIC ANTIGEN: 5.6 NG/ML
CHLORIDE BLD-SCNC: 103 MMOL/L (ref 98–107)
CO2: 23 MMOL/L (ref 20–31)
CREAT SERPL-MCNC: <0.4 MG/DL (ref 0.5–0.9)
FIO2: 50
GFR AFRICAN AMERICAN: ABNORMAL ML/MIN
GFR NON-AFRICAN AMERICAN: ABNORMAL ML/MIN
GFR SERPL CREATININE-BSD FRML MDRD: ABNORMAL ML/MIN/{1.73_M2}
GFR SERPL CREATININE-BSD FRML MDRD: ABNORMAL ML/MIN/{1.73_M2}
GLUCOSE BLD-MCNC: 119 MG/DL (ref 65–105)
GLUCOSE BLD-MCNC: 123 MG/DL (ref 65–105)
GLUCOSE BLD-MCNC: 125 MG/DL (ref 65–105)
GLUCOSE BLD-MCNC: 134 MG/DL (ref 70–99)
HCO3 ARTERIAL: 24.4 MMOL/L (ref 22–26)
HCT VFR BLD CALC: 25.1 % (ref 36–46)
HCT VFR BLD CALC: 25.8 % (ref 36–46)
HCT VFR BLD CALC: 26.1 % (ref 36–46)
HEMOGLOBIN: 8.2 G/DL (ref 12–16)
HEMOGLOBIN: 8.7 G/DL (ref 12–16)
HEMOGLOBIN: 8.8 G/DL (ref 12–16)
INR BLD: 1.2
LACTATE DEHYDROGENASE: 325 U/L (ref 135–214)
MAGNESIUM: 1.7 MG/DL (ref 1.6–2.6)
MCH RBC QN AUTO: 29.3 PG (ref 26–34)
MCHC RBC AUTO-ENTMCNC: 34.2 G/DL (ref 31–37)
MCV RBC AUTO: 85.7 FL (ref 80–100)
METHEMOGLOBIN: 0.7 % (ref 0–1.9)
MODE: ABNORMAL
MYCOPLASMA PNEUMONIAE IGM: 0.97
NEGATIVE BASE EXCESS, ART: ABNORMAL MMOL/L (ref 0–2)
NOTIFICATION TIME: ABNORMAL
NOTIFICATION: ABNORMAL
NRBC AUTOMATED: ABNORMAL PER 100 WBC
O2 DEVICE/FLOW/%: ABNORMAL
O2 SAT, ARTERIAL: 96.9 % (ref 95–98)
OXYHEMOGLOBIN: ABNORMAL % (ref 95–98)
PATIENT TEMP: 37
PCO2 ARTERIAL: 32.6 MMHG (ref 35–45)
PCO2, ART, TEMP ADJ: ABNORMAL (ref 35–45)
PDW BLD-RTO: 13.7 % (ref 11.5–14.9)
PEEP/CPAP: 10
PH ARTERIAL: 7.48 (ref 7.35–7.45)
PH, ART, TEMP ADJ: ABNORMAL (ref 7.35–7.45)
PLATELET # BLD: 275 K/UL (ref 150–450)
PMV BLD AUTO: 8.8 FL (ref 6–12)
PO2 ARTERIAL: 88.6 MMHG (ref 80–100)
PO2, ART, TEMP ADJ: ABNORMAL MMHG (ref 80–100)
POSITIVE BASE EXCESS, ART: 1 MMOL/L (ref 0–2)
POTASSIUM SERPL-SCNC: 3.1 MMOL/L (ref 3.7–5.3)
PROTHROMBIN TIME: 14.8 SEC (ref 11.8–14.6)
PSV: ABNORMAL
PT. POSITION: ABNORMAL
RBC # BLD: 3.01 M/UL (ref 4–5.2)
RESPIRATORY RATE: 25
SAMPLE SITE: ABNORMAL
SET RATE: 16
SODIUM BLD-SCNC: 134 MMOL/L (ref 135–144)
TEXT FOR RESPIRATORY: ABNORMAL
TOTAL HB: ABNORMAL G/DL (ref 12–16)
TOTAL PROTEIN: 4.2 G/DL (ref 6.4–8.3)
TOTAL RATE: 25
TRIGL SERPL-MCNC: 290 MG/DL
VANCOMYCIN TROUGH DATE LAST DOSE: ABNORMAL
VANCOMYCIN TROUGH DOSE AMOUNT: ABNORMAL
VANCOMYCIN TROUGH TIME LAST DOSE: 1150
VANCOMYCIN TROUGH: 4.6 UG/ML (ref 10–20)
VT: 450
WBC # BLD: 27.5 K/UL (ref 3.5–11)

## 2019-04-15 PROCEDURE — 86304 IMMUNOASSAY TUMOR CA 125: CPT

## 2019-04-15 PROCEDURE — 94003 VENT MGMT INPAT SUBQ DAY: CPT

## 2019-04-15 PROCEDURE — 82805 BLOOD GASES W/O2 SATURATION: CPT

## 2019-04-15 PROCEDURE — 2580000003 HC RX 258: Performed by: INTERNAL MEDICINE

## 2019-04-15 PROCEDURE — 94770 HC ETCO2 MONITOR DAILY: CPT

## 2019-04-15 PROCEDURE — 2580000003 HC RX 258: Performed by: STUDENT IN AN ORGANIZED HEALTH CARE EDUCATION/TRAINING PROGRAM

## 2019-04-15 PROCEDURE — 82947 ASSAY GLUCOSE BLOOD QUANT: CPT

## 2019-04-15 PROCEDURE — 85027 COMPLETE CBC AUTOMATED: CPT

## 2019-04-15 PROCEDURE — 36600 WITHDRAWAL OF ARTERIAL BLOOD: CPT

## 2019-04-15 PROCEDURE — 80053 COMPREHEN METABOLIC PANEL: CPT

## 2019-04-15 PROCEDURE — 94762 N-INVAS EAR/PLS OXIMTRY CONT: CPT

## 2019-04-15 PROCEDURE — 2500000003 HC RX 250 WO HCPCS: Performed by: STUDENT IN AN ORGANIZED HEALTH CARE EDUCATION/TRAINING PROGRAM

## 2019-04-15 PROCEDURE — 6360000002 HC RX W HCPCS: Performed by: STUDENT IN AN ORGANIZED HEALTH CARE EDUCATION/TRAINING PROGRAM

## 2019-04-15 PROCEDURE — 36415 COLL VENOUS BLD VENIPUNCTURE: CPT

## 2019-04-15 PROCEDURE — 6360000002 HC RX W HCPCS: Performed by: NURSE PRACTITIONER

## 2019-04-15 PROCEDURE — 82105 ALPHA-FETOPROTEIN SERUM: CPT

## 2019-04-15 PROCEDURE — 6360000002 HC RX W HCPCS: Performed by: INTERNAL MEDICINE

## 2019-04-15 PROCEDURE — 83615 LACTATE (LD) (LDH) ENZYME: CPT

## 2019-04-15 PROCEDURE — 82378 CARCINOEMBRYONIC ANTIGEN: CPT

## 2019-04-15 PROCEDURE — 99233 SBSQ HOSP IP/OBS HIGH 50: CPT | Performed by: INTERNAL MEDICINE

## 2019-04-15 PROCEDURE — 94640 AIRWAY INHALATION TREATMENT: CPT

## 2019-04-15 PROCEDURE — 85610 PROTHROMBIN TIME: CPT

## 2019-04-15 PROCEDURE — 2500000003 HC RX 250 WO HCPCS: Performed by: INTERNAL MEDICINE

## 2019-04-15 PROCEDURE — 84478 ASSAY OF TRIGLYCERIDES: CPT

## 2019-04-15 PROCEDURE — 85014 HEMATOCRIT: CPT

## 2019-04-15 PROCEDURE — 85018 HEMOGLOBIN: CPT

## 2019-04-15 PROCEDURE — 2000000000 HC ICU R&B

## 2019-04-15 PROCEDURE — 2700000000 HC OXYGEN THERAPY PER DAY

## 2019-04-15 PROCEDURE — 99222 1ST HOSP IP/OBS MODERATE 55: CPT | Performed by: INTERNAL MEDICINE

## 2019-04-15 PROCEDURE — 36592 COLLECT BLOOD FROM PICC: CPT

## 2019-04-15 PROCEDURE — 71045 X-RAY EXAM CHEST 1 VIEW: CPT

## 2019-04-15 PROCEDURE — 6370000000 HC RX 637 (ALT 250 FOR IP): Performed by: INTERNAL MEDICINE

## 2019-04-15 PROCEDURE — 83735 ASSAY OF MAGNESIUM: CPT

## 2019-04-15 PROCEDURE — 80202 ASSAY OF VANCOMYCIN: CPT

## 2019-04-15 PROCEDURE — 86301 IMMUNOASSAY TUMOR CA 19-9: CPT

## 2019-04-15 RX ORDER — METHYLPREDNISOLONE SODIUM SUCCINATE 40 MG/ML
40 INJECTION, POWDER, LYOPHILIZED, FOR SOLUTION INTRAMUSCULAR; INTRAVENOUS EVERY 6 HOURS
Status: DISCONTINUED | OUTPATIENT
Start: 2019-04-15 | End: 2019-04-16

## 2019-04-15 RX ADMIN — MEROPENEM 1 G: 1 INJECTION, POWDER, FOR SOLUTION INTRAVENOUS at 10:04

## 2019-04-15 RX ADMIN — FAMOTIDINE 20 MG: 10 INJECTION, SOLUTION INTRAVENOUS at 10:05

## 2019-04-15 RX ADMIN — MINERAL OIL AND WHITE PETROLATUM: 150; 830 OINTMENT OPHTHALMIC at 10:09

## 2019-04-15 RX ADMIN — CHLORHEXIDINE GLUCONATE 0.12% ORAL RINSE 15 ML: 1.2 LIQUID ORAL at 10:07

## 2019-04-15 RX ADMIN — INSULIN LISPRO 2 UNITS: 100 INJECTION, SOLUTION INTRAVENOUS; SUBCUTANEOUS at 20:26

## 2019-04-15 RX ADMIN — IPRATROPIUM BROMIDE 0.5 MG: 0.5 SOLUTION RESPIRATORY (INHALATION) at 22:45

## 2019-04-15 RX ADMIN — POLYVINYL ALCOHOL 1 DROP: 14 SOLUTION/ DROPS OPHTHALMIC at 10:09

## 2019-04-15 RX ADMIN — LEVALBUTEROL 1.25 MG: 1.25 SOLUTION, CONCENTRATE RESPIRATORY (INHALATION) at 11:43

## 2019-04-15 RX ADMIN — IPRATROPIUM BROMIDE 0.5 MG: 0.5 SOLUTION RESPIRATORY (INHALATION) at 00:54

## 2019-04-15 RX ADMIN — LEVALBUTEROL 1.25 MG: 1.25 SOLUTION, CONCENTRATE RESPIRATORY (INHALATION) at 19:03

## 2019-04-15 RX ADMIN — POLYVINYL ALCOHOL 1 DROP: 14 SOLUTION/ DROPS OPHTHALMIC at 00:55

## 2019-04-15 RX ADMIN — POLYVINYL ALCOHOL 1 DROP: 14 SOLUTION/ DROPS OPHTHALMIC at 12:10

## 2019-04-15 RX ADMIN — PROPOFOL 35 MCG/KG/MIN: 10 INJECTION, EMULSION INTRAVENOUS at 02:52

## 2019-04-15 RX ADMIN — I.V. FAT EMULSION 100 ML: 20 EMULSION INTRAVENOUS at 17:50

## 2019-04-15 RX ADMIN — POTASSIUM CHLORIDE 10 MEQ: 7.46 INJECTION, SOLUTION INTRAVENOUS at 12:10

## 2019-04-15 RX ADMIN — METHYLPREDNISOLONE SODIUM SUCCINATE 60 MG: 125 INJECTION, POWDER, FOR SOLUTION INTRAMUSCULAR; INTRAVENOUS at 10:05

## 2019-04-15 RX ADMIN — LEVALBUTEROL 1.25 MG: 1.25 SOLUTION, CONCENTRATE RESPIRATORY (INHALATION) at 15:08

## 2019-04-15 RX ADMIN — LEVALBUTEROL 1.25 MG: 1.25 SOLUTION, CONCENTRATE RESPIRATORY (INHALATION) at 22:45

## 2019-04-15 RX ADMIN — FENTANYL CITRATE 50 MCG: 50 INJECTION INTRAMUSCULAR; INTRAVENOUS at 20:25

## 2019-04-15 RX ADMIN — VANCOMYCIN HYDROCHLORIDE 1000 MG: 1 INJECTION, POWDER, LYOPHILIZED, FOR SOLUTION INTRAVENOUS at 23:48

## 2019-04-15 RX ADMIN — LEVALBUTEROL 1.25 MG: 1.25 SOLUTION, CONCENTRATE RESPIRATORY (INHALATION) at 04:21

## 2019-04-15 RX ADMIN — PROPOFOL 30 MCG/KG/MIN: 10 INJECTION, EMULSION INTRAVENOUS at 18:00

## 2019-04-15 RX ADMIN — LEVALBUTEROL 1.25 MG: 1.25 SOLUTION, CONCENTRATE RESPIRATORY (INHALATION) at 08:25

## 2019-04-15 RX ADMIN — POLYVINYL ALCOHOL 1 DROP: 14 SOLUTION/ DROPS OPHTHALMIC at 23:47

## 2019-04-15 RX ADMIN — METHYLPREDNISOLONE SODIUM SUCCINATE 40 MG: 40 INJECTION, POWDER, FOR SOLUTION INTRAMUSCULAR; INTRAVENOUS at 20:25

## 2019-04-15 RX ADMIN — IPRATROPIUM BROMIDE 0.5 MG: 0.5 SOLUTION RESPIRATORY (INHALATION) at 08:25

## 2019-04-15 RX ADMIN — POTASSIUM CHLORIDE: 2 INJECTION, SOLUTION, CONCENTRATE INTRAVENOUS at 17:50

## 2019-04-15 RX ADMIN — FAMOTIDINE 20 MG: 10 INJECTION, SOLUTION INTRAVENOUS at 20:25

## 2019-04-15 RX ADMIN — SODIUM CHLORIDE, POTASSIUM CHLORIDE, SODIUM LACTATE AND CALCIUM CHLORIDE: 600; 310; 30; 20 INJECTION, SOLUTION INTRAVENOUS at 02:52

## 2019-04-15 RX ADMIN — METHYLPREDNISOLONE SODIUM SUCCINATE 60 MG: 125 INJECTION, POWDER, FOR SOLUTION INTRAMUSCULAR; INTRAVENOUS at 03:48

## 2019-04-15 RX ADMIN — PROPOFOL 30 MCG/KG/MIN: 10 INJECTION, EMULSION INTRAVENOUS at 23:50

## 2019-04-15 RX ADMIN — MEROPENEM 1 G: 1 INJECTION, POWDER, FOR SOLUTION INTRAVENOUS at 00:56

## 2019-04-15 RX ADMIN — POLYVINYL ALCOHOL 1 DROP: 14 SOLUTION/ DROPS OPHTHALMIC at 20:25

## 2019-04-15 RX ADMIN — POTASSIUM CHLORIDE 10 MEQ: 7.46 INJECTION, SOLUTION INTRAVENOUS at 06:58

## 2019-04-15 RX ADMIN — IPRATROPIUM BROMIDE 0.5 MG: 0.5 SOLUTION RESPIRATORY (INHALATION) at 19:03

## 2019-04-15 RX ADMIN — Medication 10 ML: at 20:25

## 2019-04-15 RX ADMIN — CHLORHEXIDINE GLUCONATE 0.12% ORAL RINSE 15 ML: 1.2 LIQUID ORAL at 20:25

## 2019-04-15 RX ADMIN — MINERAL OIL AND WHITE PETROLATUM: 150; 830 OINTMENT OPHTHALMIC at 00:55

## 2019-04-15 RX ADMIN — IPRATROPIUM BROMIDE 0.5 MG: 0.5 SOLUTION RESPIRATORY (INHALATION) at 11:43

## 2019-04-15 RX ADMIN — LEVALBUTEROL 1.25 MG: 1.25 SOLUTION, CONCENTRATE RESPIRATORY (INHALATION) at 00:53

## 2019-04-15 RX ADMIN — NOREPINEPHRINE BITARTRATE 6 MCG/MIN: 1 INJECTION INTRAVENOUS at 02:51

## 2019-04-15 RX ADMIN — LEVOFLOXACIN 500 MG: 5 INJECTION, SOLUTION INTRAVENOUS at 14:14

## 2019-04-15 RX ADMIN — MINERAL OIL AND WHITE PETROLATUM: 150; 830 OINTMENT OPHTHALMIC at 23:47

## 2019-04-15 RX ADMIN — IPRATROPIUM BROMIDE 0.5 MG: 0.5 SOLUTION RESPIRATORY (INHALATION) at 04:21

## 2019-04-15 RX ADMIN — METHYLPREDNISOLONE SODIUM SUCCINATE 40 MG: 40 INJECTION, POWDER, FOR SOLUTION INTRAMUSCULAR; INTRAVENOUS at 14:14

## 2019-04-15 RX ADMIN — IPRATROPIUM BROMIDE 0.5 MG: 0.5 SOLUTION RESPIRATORY (INHALATION) at 15:08

## 2019-04-15 RX ADMIN — POTASSIUM CHLORIDE 10 MEQ: 7.46 INJECTION, SOLUTION INTRAVENOUS at 10:06

## 2019-04-15 RX ADMIN — ENOXAPARIN SODIUM 40 MG: 100 INJECTION SUBCUTANEOUS at 10:06

## 2019-04-15 RX ADMIN — VANCOMYCIN HYDROCHLORIDE 1000 MG: 1 INJECTION, POWDER, LYOPHILIZED, FOR SOLUTION INTRAVENOUS at 14:17

## 2019-04-15 RX ADMIN — MINERAL OIL AND WHITE PETROLATUM: 150; 830 OINTMENT OPHTHALMIC at 20:25

## 2019-04-15 RX ADMIN — POTASSIUM CHLORIDE 10 MEQ: 7.46 INJECTION, SOLUTION INTRAVENOUS at 08:07

## 2019-04-15 ASSESSMENT — PULMONARY FUNCTION TESTS
PIF_VALUE: 20
PIF_VALUE: 17
PIF_VALUE: 18
PIF_VALUE: 15
PIF_VALUE: 15
PIF_VALUE: 16
PIF_VALUE: 23
PIF_VALUE: 17
PIF_VALUE: 17
PIF_VALUE: 16
PIF_VALUE: 19
PIF_VALUE: 16
PIF_VALUE: 20
PIF_VALUE: 17
PIF_VALUE: 20

## 2019-04-15 ASSESSMENT — PAIN SCALES - GENERAL
PAINLEVEL_OUTOF10: 0

## 2019-04-15 NOTE — PROGRESS NOTES
Please refer to the notes by the resident, it is my impression that she might have underlying intra-abdominal malignancy. The ordered tumor markers. She also has high LDH increases CA-19-9, increase CEA. There is a strong possibility of a pancreatic neoplasm with evidence of gastric outlet obstruction. Please refer to resident's note.

## 2019-04-15 NOTE — CARE COORDINATION
ONGOING DISCHARGE PLAN:    Unable to speak to pt. At this time. Pt Remains on the Vent. Plan remains for LSW to continue to follow for possible return to [facility], from where she came from. Per Vitor Mike, would need Pre-cert to return. PT, Rec SNF, will continue to follow rec. TPN to start today. Pt. Remains On IV Levaquin/Vanco & steroids 40 mg Q6. Will continue to follow for additional discharge needs.     Electronically signed by Verna Thorne RN on 4/15/2019 at 11:13 AM

## 2019-04-15 NOTE — PROGRESS NOTES
moderate layering bilateral pleural effusions with bilateral  lower lobe atelectasis. Organs: Limited evaluation due lack of intravenous contrast.  Cholelithiasis  redemonstrated. No gallbladder wall thickening or biliary ductal dilatation. Scattered tiny hypodense lesions in the liver are too small to characterize  but statistically represent benign cysts or hemangiomas and appear unchanged. The pancreas, spleen, adrenal glands, and kidneys are unremarkable. There is  no hydronephrosis or urinary tract calculus. GI/Bowel: The stomach is distended. Enteric tube is in place. No contrast  is seen distal to the pylorus and there is contrast reflux into the distal  esophagus. There is no evidence of bowel obstruction. The appendix is not  definitely visualized. No focal pericecal inflammatory changes are evident. Pelvis: The urinary bladder is decompressed by Tran catheter. No pelvic  mass is seen. Peritoneum/Retroperitoneum: Small amount of free fluid in the pelvic cavity. No free air or focal fluid collection. No abnormal lymph node. Normal  abdominal aortic caliber. Moderate calcific atherosclerosis. Bones/Soft Tissues: No acute osseous abnormality. Diffuse anasarca. Moderate degenerative changes in the lumbar spine. Impression:      1. Distended, contrast filled stomach with reflux of contrast into the  esophagus. No enteric contrast is seen distal to the pylorus suggesting  delayed gastric emptying in the setting of ileus. 2. New moderate layering bilateral pleural effusions with bilateral lower  lobe atelectasis. 3. Small amount of nonspecific free fluid in the pelvic cavity. 4. Anasarca. 5. Cholelithiasis.            ASSESSMENT     Principal Problem:    Sepsis due to urinary tract infection ([address]  Active Problems:    Cystitis    Emphysematous cystitis    Septic shock (HCC)    Leukemoid reaction    Aspiration pneumonia of right lower lobe due to vomit ([address]    MRSA carrier  Resolved Problems:    * No resolved hospital problems. *  sepsis  ? Gastric outlet obstruction    Plan  1. Recommend GI evaluation  2. Keep NGT to suction  3.  Patient should start TPN

## 2019-04-15 NOTE — FLOWSHEET NOTE
04/14/19 1045   Encounter Summary   Services provided to: Patient   Referral/Consult From: [facility] [place] [facility]   (4/14/19V)   Volunteer Visit Yes   Complexity of Encounter Moderate   Length of Encounter 15 minutes   Routine   Type Follow up   Intervention Prayer

## 2019-04-15 NOTE — PLAN OF CARE
Nutrition Problem: Inadequate oral intake  Intervention: Food and/or Nutrient Delivery: Continue NPO, Discontinue ONS  Nutritional Goals: PN to meet greater 90% of estimated nutrition needs

## 2019-04-15 NOTE — PROGRESS NOTES
Pharmacy Vancomycin Consult     Vancomycin Day: 4  Current Dosin mg every 24 hours    Temp max:  99.1    Recent Labs     19  0439 04/15/19  0422   BUN 8 6*       Recent Labs     19  0439 04/15/19  0422   CREATININE <0.40* <0.40*       Recent Labs     19  0439 04/15/19  0422   WBC 27.5* 27.5*         Intake/Output Summary (Last 24 hours) at 4/15/2019 1201  Last data filed at 4/15/2019 0600  Gross per 24 hour   Intake 6906 ml   Output 5225 ml   Net 1681 ml       Culture Date      Source                       Results  See micro    Ht Readings from Last 1 Encounters:   19 5' (1.524 m)        Wt Readings from Last 1 Encounters:   04/15/19 126 lb 5.2 oz (57.3 kg)         Body mass index is 24.67 kg/m². CrCl cannot be calculated (This lab value cannot be used to calculate CrCl because it is not a number: <0.40).     Trough: 4.6 @ 1110 (true trough)    Assessment/Plan:  Increase dose to 1000 mg every 12 hours  Goal trough 15-20  Renal function stable    Natalia Fan, PharmD, [place]  4/15/2019   12:01 PM

## 2019-04-15 NOTE — PROGRESS NOTES
[facility] Nurse  Consult Note       NAME:  Cass Fraga [facility]  MEDICAL RECORD NUMBER:  [id number]  AGE: 77 y.o. GENDER: female  : 1950  TODAY'S DATE:  4/15/2019    Subjective   Reason for WOCN Evaluation and Assessment: deep tissue injury to left posterior thigh from cast, redness to sacrum      Winston Martinez is a 77 y.o. female referred by:   [] Physician  [] Nursing  [] Other:     Wound Identification:  Wound Type: pressure  Contributing Factors: edema, chronic pressure, decreased mobility, shear force and decreased tissue oxygenation      Patient Goal of Care:  [] Wound Healing  [] Odor Control  [] Palliative Care  [] Pain Control   [] Other:         PAST MEDICAL HISTORY        Diagnosis Date    Abnormal computed tomography of cervical spine     sclerotic bone appearance     CVA (cerebral vascular accident) ([address]     left  side weakness    GERD (gastroesophageal reflux disease)     Hypertension     Paraproteinemia     Weight loss        PAST SURGICAL HISTORY    Past Surgical History:   Procedure Laterality Date    INTUBATION  2019         PACEMAKER PLACEMENT  2011    Pacemaker is Medtronic Revo (compatible). Leads placed in  are NOT MRI compatible. Placed at [address] [place] [address]. V's per Dr. Mandi Frey can not have an MRI. FAMILY HISTORY    History reviewed. No pertinent family history. SOCIAL HISTORY    Social History     Tobacco Use    Smoking status: Current Some Day Smoker     Packs/day: 1.00     Years: 30.00     Pack years: 30.00    Smokeless tobacco: Never Used   Substance Use Topics    Alcohol use: Not Currently     Alcohol/week: 16.8 oz     Types: 28 Glasses of wine per week    Drug use: No       ALLERGIES    Allergies   Allergen Reactions    Penicillins Shortness Of Breath and Rash    Amoxicillin        MEDICATIONS    No current facility-administered medications on file prior to encounter.       Current Outpatient Medications on File Prior to Encounter Medication Sig Dispense Refill    oxyCODONE-acetaminophen (PERCOCET) 5-325 MG per tablet Take 1 tablet by mouth every 6 hours as needed for Pain.  senna-docusate (PERICOLACE) 8.6-50 MG per tablet Take 1 tablet by mouth 2 times daily      budesonide-formoterol (SYMBICORT) 160-4.5 MCG/ACT AERO Inhale 2 puffs into the lungs 2 times daily      sucralfate (CARAFATE) 1 GM/10ML suspension Take 1 g by mouth 4 times daily       traZODone (DESYREL) 50 MG tablet Take 50 mg by mouth nightly      tiZANidine (ZANAFLEX) 2 MG tablet Take 2 mg by mouth every 8 hours as needed (left knee)       aspirin 81 MG tablet Take 81 mg by mouth daily      clopidogrel (PLAVIX) 75 MG tablet TAKE 1 TABLET DAILY 30 tablet 2    DOCQLACE 100 MG capsule TAKE 1 CAPSULE BY MOUTH IN THE MORNING & IN THE EVENING -DRINK PLENTYOF WATER WHILE TAKING THIS MEDICINE 30 capsule 1    amLODIPine (NORVASC) 10 MG tablet Take 10 mg by mouth daily.  LORazepam (ATIVAN) 0.5 MG tablet Take 0.5 mg by mouth every 6 hours as needed for Anxiety.  therapeutic multivitamin-minerals (THERAGRAN-M) tablet Take 1 tablet by mouth daily.  omeprazole (PRILOSEC) 20 MG capsule Take 20 mg by mouth daily.  venlafaxine (EFFEXOR) 37.5 MG tablet Take 37.5 mg by mouth 3 times daily.  Misc. Devices [facility]) [address] [facility] wheelchair with left fupper extremity support  Dx: stroke with left hemiparesis.  1 each 0       Objective    BP (!) 114/56   Pulse 96   Temp 98.9 °F (37.2 °C) (Oral)   Resp 23   Ht 5' (1.524 m)   Wt 126 lb 5.2 oz (57.3 kg)   SpO2 100%   BMI 24.67 kg/m²     LABS:  WBC:    Lab Results   Component Value Date    WBC 27.5 04/15/2019     H/H:    Lab Results   Component Value Date    HGB 8.7 04/15/2019    HCT 26.1 04/15/2019     PTT:    Lab Results   Component Value Date    APTT 27.7 03/28/2012   [APTT}  PT/INR:    Lab Results   Component Value Date    PROTIME 14.8 04/15/2019    PROTIME 10.9 03/28/2012    INR 1.2 04/15/2019 HgBA1c:    Lab Results   Component Value Date    LABA1C 5.3 04/12/2019       Assessment   Collin Risk Score: Collin Scale Score: 13    Patient Active Problem List   Diagnosis Code    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    CVA (cerebral vascular accident) ([facility] I63.9    Abnormal computed tomography of cervical spine R93.7    Weight loss R63.4    Functional gait abnormality R26.89    Left knee pain M25.562    Knee pain, left M25.562    Acute pain of left knee M25.562    Sepsis due to urinary tract infection (HCC) A41.9, N39.0    Cystitis N30.90    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    Septic shock (HCC) A41.9, R65.21    Leukemoid reaction D72.823    Aspiration pneumonia of right lower lobe due to vomit ([facility] J69.0    MRSA carrier Z22.322       Measurements:  Wound 04/11/19 Coccyx Mid (Active)   Wound Pressure Stage  1 4/13/2019  4:00 PM   Dressing/Treatment Moisture barrier;Open to air 4/15/2019 12:00 PM   Wound Assessment Dry; Intact 4/15/2019 12:00 PM   Drainage Amount None 4/15/2019 12:00 PM   Number of days: 3       Wound 04/15/19 Thigh Left;Posterior (Active)   Wound Image   4/15/2019 11:37 AM   Wound Deep tissue/Injury 4/15/2019 11:37 AM   Dressing Change Due 04/18/19 4/15/2019 11:37 AM   Wound Length (cm) 1 cm 4/15/2019 11:37 AM   Wound Width (cm) 14 cm 4/15/2019 11:37 AM   Wound Surface Area (cm^2) 14 cm^2 4/15/2019 11:37 AM   Wound Assessment Purple; Intact 4/15/2019 11:37 AM   Drainage Amount None 4/15/2019 11:37 AM   Number of days: 0            Response to treatment:  tolerated by patient. Redness to sacral area, skin is intact, recommend sacral mepilex to area  Discussed with primary care nurse need to cut edge of cast or remove entire cast if possible due to pressure injury to left upper posterior thigh.  Area padded with mepilex and ABD dressing at present    Plan   Plan of Care: Wound 04/11/19 Coccyx Mid-Dressing/Treatment: Moisture barrier, Open to air    Specialty Bed Required : Yes   [] Low Air

## 2019-04-15 NOTE — PLAN OF CARE
Problem: Risk for Impaired Skin Integrity  Goal: Tissue integrity - skin and mucous membranes  Description  Structural intactness and normal physiological function of skin and  mucous membranes. Note:   No area of skin breakdown noted. Reposition patient frequently to prevent skin breakdown      Problem: Restraint Use - Nonviolent/Non-Self-Destructive Behavior:  Goal: Absence of restraint indications  Description  Absence of restraint indications  Note:   All tubes and lines remain intact. Pt reaches for ETT when restriants released.  Reassurance given

## 2019-04-15 NOTE — PROGRESS NOTES
ICU Progress Note (Vent)   Pulmonary and Critical Care Specialists    Patient - Fercho Esquivel,  Age - 77 y.o.    - 1950      Room Number -    MRN -  [id number]   [facility]  Date of Admission -  2019  2:48 PM    Events of Past 24 Hours    Patient is intubated on vent support. Not follow commands at this time. On 50% with a PEEP of 10. This appears to have improved    Vitals    height is 5' (1.524 m) and weight is 126 lb 5.2 oz (57.3 kg). Her oral temperature is 98.7 °F (37.1 °C). Her blood pressure is 114/58 (abnormal) and her pulse is 93. Her respiration is 22 and oxygen saturation is 98%. Temperature Range: Temp: 98.7 °F (37.1 °C) Temp  Av.6 °F (37 °C)  Min: 98.2 °F (36.8 °C)  Max: 99.9 °F (37.7 °C)  BP Range:  Systolic (40LAL), IWH:383 , Min:78 , DRL:490     Diastolic (91GXM), VOM:18, Min:37, Max:77    Pulse Range: Pulse  Av  Min: 90  Max: 116  Respiration Range: Resp  Av.2  Min: 16  Max: 31  Current Pulse Ox[de-identified]  SpO2: 98 %  24HR Pulse Ox Range:  SpO2  Av.3 %  Min: 94 %  Max: 99 %  Oxygen Amount and Delivery: O2 Flow Rate (L/min): 15 L/min      Wt Readings from Last 3 Encounters:   04/15/19 126 lb 5.2 oz (57.3 kg)   19 89 lb (40.4 kg)   19 94 lb 1.6 oz (42.7 kg)     I/O       Intake/Output Summary (Last 24 hours) at 4/15/2019 1021  Last data filed at 4/15/2019 0600  Gross per 24 hour   Intake 6906 ml   Output 5225 ml   Net 1681 ml     I/O last 3 completed shifts:   In: 6906 [I.V.:6806; IV Piggyback:100]  Out: 4439 [Urine:3550; Emesis/NG PFQSXJ:9038]     DRAIN/TUBE OUTPUT:     Invasive Lines   ETT Day -   2  Right IJ day 3    Mechanical Ventilation Data   SETTINGS (Comprehensive)  Vent Information  $Ventilation: $Subsequent Day  Ventilator Started: Yes  Ventilation Day(s): 2  Equipment ID: [id number]  Vent Type: Servo i  Vent Mode: PRVC  Vt Ordered: 450 mL  Rate Set: 16 bmp  FiO2 : 50 %  Sensitivity: 5  PEEP/CPAP: 10  I Time/ I Time %: 0.9 s  Cuff Pressure (cm H2O): 20 cm H2O  Humidification Source: HME  Additional Respiratory  Assessments  Pulse: 93  Resp: 22  SpO2: 98 %  End Tidal CO2: 28 (%)  Position: Semi-Diaz's  Humidification Source: HME  Oral Care: Mouth swabbed, Mouth moisturizer, Mouth suctioned, Suction toothette  Cuff Pressure (cm H2O): 20 cm H2O       ABGs:   Lab Results   Component Value Date    PHART 7.483 04/15/2019    PO2ART 88.6 04/15/2019    HBA5YMQ 32.6 04/15/2019       Lab Results   Component Value Date    MODE PRVC 04/15/2019         Medications   IV   propofol 30 mcg/kg/min (04/15/19 0422)    vasopressin (Septic Shock) infusion Stopped (04/14/19 1930)    norepinephrine 11 mcg/min (04/15/19 0419)    lactated ringers 125 mL/hr at 04/15/19 0252    dextrose        polyvinyl alcohol  1 drop Both Eyes Q4H    And    lubrifresh P.M. Both Eyes Q4H    chlorhexidine  15 mL Mouth/Throat BID    ipratropium  0.5 mg Nebulization Q4H    methylPREDNISolone  60 mg Intravenous Q6H    insulin lispro  0-12 Units Subcutaneous Q6H    levalbuterol  1.25 mg Nebulization Q4H    docusate sodium  100 mg Oral TID    vancomycin  750 mg Intravenous Q24H    vancomycin (VANCOCIN) intermittent dosing (placeholder)   Other RX Placeholder    levofloxacin  500 mg Intravenous Q24H    meropenem  1 g Intravenous Q8H    famotidine (PEPCID) injection  20 mg Intravenous BID    venlafaxine  37.5 mg Oral TID    clopidogrel  75 mg Oral Daily    aspirin  81 mg Oral Daily    sodium chloride flush  10 mL Intravenous 2 times per day    enoxaparin  40 mg Subcutaneous Daily       Diet/Nutrition   Dietary Nutrition Supplements: Standard High Calorie Oral Supplement    Exam   VITALS    height is 5' (1.524 m) and weight is 126 lb 5.2 oz (57.3 kg). Her oral temperature is 98.7 °F (37.1 °C). Her blood pressure is 114/58 (abnormal) and her pulse is 93. Her respiration is 22 and oxygen saturation is 98%.    Ventilator Settings (Basic)  Vent Mode: PRVC Rate Set: 16 bmp/Vt Ordered: 450 mL/ /FiO2 : 50 %    Constitutional - Sedated  General Appearance  well developed, well nourished  HEENT - Life support devices in place (ET,),normocephalic, atraumatic. Lungs - Chest expands equally, no wheezes, rales or rhonchi. Cardiovascular - Heart sounds are normal.  normal rate and rhythm regular, no murmur, gallop or rub. Abdomen - soft, nontender, nondistended, no masses   Extremities - no cyanosis, clubbing     Lab Results   CBC     Lab Results   Component Value Date    WBC 27.5 04/15/2019    RBC 3.01 04/15/2019    RBC 3.66 05/23/2012    HGB 8.8 04/15/2019    HCT 25.8 04/15/2019     04/15/2019     05/23/2012    MCV 85.7 04/15/2019    MCH 29.3 04/15/2019    MCHC 34.2 04/15/2019    RDW 13.7 04/15/2019    METASPCT 2 04/11/2019    LYMPHOPCT 4 04/11/2019    MONOPCT 2 04/11/2019    BASOPCT 0 04/11/2019    MONOSABS 0.96 04/11/2019    LYMPHSABS 1.92 04/11/2019    EOSABS 0.00 04/11/2019    BASOSABS 0.00 04/11/2019    DIFFTYPE NOT REPORTED 04/11/2019       BMP   Lab Results   Component Value Date     04/15/2019    K 3.1 04/15/2019     04/15/2019    CO2 23 04/15/2019    BUN 6 04/15/2019    CREATININE <0.40 04/15/2019    GLUCOSE 134 04/15/2019    GLUCOSE 87 05/21/2012    CALCIUM 7.1 04/15/2019       LFTS  Lab Results   Component Value Date    ALKPHOS 126 04/15/2019    ALT 20 04/15/2019    AST 48 04/15/2019    PROT 4.2 04/15/2019    BILITOT 0.74 04/15/2019    BILIDIR 0.21 05/17/2012    IBILI 0.36 05/17/2012    LABALBU 1.7 04/15/2019    LABALBU 4.6 05/17/2012       INR  Recent Labs     04/13/19  0314 04/14/19  0439 04/15/19  0422   PROTIME 23.7* 22.7* 14.8*   INR 2.1 2.0 1.2       APTT  No results for input(s): APTT in the last 72 hours. Lactic Acid  Lab Results   Component Value Date    LACTA 2.1 04/14/2019    LACTA 1.5 04/12/2019    LACTA 1.9 04/12/2019        BNP   No results for input(s): BNP in the last 72 hours.      Cultures       Radiology     Plain Films Chest x-ray reviewed. The congestive changes improved    See actual reports for details    SYSTEM ASSESSMENT  Acute hypoxic respiratory failure  FiO2 now down to 50% with PEEP of 10  UTI E coli  not ESBL        Neuro       Respiratory   Wean oxygen as tolerated. Keep O2 sat 90-92%    Hemodynamics       Gastrointestinal/Nutrition       Renal       Infectious Disease       Hematology/Oncology       Endocrine       Social/Spiritual/DNR/Disposition/Other   Wean FiO2 hopefully can wean PEEP soon  On Lovenox for DVT prophylaxis  On vancomycin and Levaquin.   Decrease Solu-Medrol from 60-40 mg every 6 hours  Wean Levophed as tolerated  Critical Care Time   35 min    Electronically signed by Khadijah Bhandari MD on 4/15/2019 at 10:21 AM

## 2019-04-15 NOTE — PROGRESS NOTES
Department of Urology  Urology Consult Note    Patient:  Keshia Joseph  MRN: [id number]  YOB: 1950        CHIEF COMPLAINT:    Chief Complaint   Patient presents with    Abdominal Pain         HISTORY OF PRESENT ILLNESS:    Stable urologically. No new urinary issues. Past Medical History:        Diagnosis Date    Abnormal computed tomography of cervical spine     sclerotic bone appearance     CVA (cerebral vascular accident) ([address]     left  side weakness    GERD (gastroesophageal reflux disease)     Hypertension     Paraproteinemia     Weight loss      Past Surgical History:        Procedure Laterality Date    INTUBATION  4/14/2019         PACEMAKER PLACEMENT  07/2011    Pacemaker is Medtronic Revo (compatible). Leads placed in 1995 are NOT MRI compatible. Placed at [place]. V's per Dr. Lacy Dalal can not have an MRI.      Current Medications:   Current Facility-Administered Medications: fentaNYL (SUBLIMAZE) injection 50 mcg, 50 mcg, Intravenous, Q30 Min PRN  propofol 1000 MG/100ML injection, 10 mcg/kg/min, Intravenous, Continuous  polyvinyl alcohol (LIQUIFILM TEARS) 1.4 % ophthalmic solution 1 drop, 1 drop, Both Eyes, Q4H **AND** lubrifresh P.M. (artificial tears) ophthalmic ointment, , Both Eyes, Q4H  chlorhexidine (PERIDEX) 0.12 % solution 15 mL, 15 mL, Mouth/Throat, BID  ipratropium (ATROVENT) 0.02 % nebulizer solution 0.5 mg, 0.5 mg, Nebulization, Q4H  methylPREDNISolone sodium (SOLU-MEDROL) injection 60 mg, 60 mg, Intravenous, Q6H  insulin lispro (HUMALOG) injection vial 0-12 Units, 0-12 Units, Subcutaneous, Q6H  levalbuterol (XOPENEX) nebulizer solution 1.25 mg, 1.25 mg, Nebulization, Q4H  sodium chloride nebulizer 0.9 % solution 3 mL, 3 mL, Nebulization, Q8H PRN  fentaNYL (SUBLIMAZE) injection 50 mcg, 50 mcg, Intravenous, Q2H PRN  oxyCODONE-acetaminophen (PERCOCET) 5-325 MG per tablet 1 tablet, 1 tablet, Oral, Q4H PRN  docusate sodium (COLACE) capsule 100 mg, 100 mg, Oral, TID  vancomycin (VANCOCIN) 750 mg in dextrose 5 % 250 mL IVPB, 750 mg, Intravenous, Q24H  vancomycin (VANCOCIN) intermittent dosing (placeholder), , Other, RX Placeholder  levofloxacin (LEVAQUIN) 500 MG/100ML infusion 500 mg, 500 mg, Intravenous, Q24H  vasopressin 20 Units in dextrose 5 % 100 mL infusion, 0.02 Units/min, Intravenous, Continuous  magnesium sulfate 1 g in dextrose 5% 100 mL IVPB, 1 g, Intravenous, PRN  sodium phosphate 7.41 mmol in dextrose 5 % 250 mL IVPB, 0.16 mmol/kg, Intravenous, PRN **OR** sodium phosphate 14.85 mmol in dextrose 5 % 250 mL IVPB, 0.32 mmol/kg, Intravenous, PRN  potassium chloride (KLOR-CON M) extended release tablet 40 mEq, 40 mEq, Oral, PRN **OR** potassium bicarb-citric acid (EFFER-K) effervescent tablet 40 mEq, 40 mEq, Oral, PRN **OR** potassium chloride 10 mEq/100 mL IVPB (Peripheral Line), 10 mEq, Intravenous, PRN  norepinephrine (LEVOPHED) 16 mg in dextrose 5 % 250 mL infusion, 2 mcg/min, Intravenous, Continuous  meropenem (MERREM) 1 g in sodium chloride 0.9 % 100 mL IVPB (mini-bag), 1 g, Intravenous, Q8H  lactated ringers infusion, , Intravenous, Continuous  famotidine (PEPCID) injection 20 mg, 20 mg, Intravenous, BID  glucose (GLUTOSE) 40 % oral gel 15 g, 15 g, Oral, PRN  dextrose 50 % solution 12.5 g, 12.5 g, Intravenous, PRN  glucagon (rDNA) injection 1 mg, 1 mg, Intramuscular, PRN  dextrose 5 % solution, 100 mL/hr, Intravenous, PRN  milk and molasses enema 240 mL, 240 mL, Rectal, Daily PRN  sodium chloride flush 0.9 % injection 10 mL, 10 mL, Intravenous, PRN  venlafaxine (EFFEXOR) tablet 37.5 mg, 37.5 mg, Oral, TID  clopidogrel (PLAVIX) tablet 75 mg, 75 mg, Oral, Daily  aspirin EC tablet 81 mg, 81 mg, Oral, Daily  sodium chloride flush 0.9 % injection 10 mL, 10 mL, Intravenous, 2 times per day  sodium chloride flush 0.9 % injection 10 mL, 10 mL, Intravenous, PRN  acetaminophen (TYLENOL) tablet 650 mg, 650 mg, Oral, Q4H PRN  enoxaparin (LOVENOX) injection 40 mg, 40 mg, Subcutaneous, Daily  ketorolac (TORADOL) injection 30 mg, 30 mg, Intravenous, Q8H PRN    Allergies: ALG@    Social History:   Social History     Socioeconomic History    Marital status:      Spouse name: Not on file    Number of children: Not on file    Years of education: Not on file    Highest education level: Not on file   Occupational History    Not on file   Social Needs    Financial resource strain: Not on file    Food insecurity:     Worry: Not on file     Inability: Not on file    Transportation needs:     Medical: Not on file     Non-medical: Not on file   Tobacco Use    Smoking status: Current Some Day Smoker     Packs/day: 1.00     Years: 30.00     Pack years: 30.00    Smokeless tobacco: Never Used   Substance and Sexual Activity    Alcohol use: Not Currently     Alcohol/week: 16.8 oz     Types: 28 Glasses of wine per week    Drug use: No    Sexual activity: Not on file   Lifestyle    Physical activity:     Days per week: Not on file     Minutes per session: Not on file    Stress: Not on file   Relationships    Social connections:     Talks on phone: Not on file     Gets together: Not on file     Attends Anabaptist service: Not on file     Active member of club or organization: Not on file     Attends meetings of clubs or organizations: Not on file     Relationship status: Not on file    Intimate partner violence:     Fear of current or ex partner: Not on file     Emotionally abused: Not on file     Physically abused: Not on file     Forced sexual activity: Not on file   Other Topics Concern    Not on file   Social History Narrative    Not on file       Family History:   History reviewed. No pertinent family history. Review of Systems:  Constitutional: Negative for fever, chills and activity change. Eyes: Negative for pain, redness and visual disturbance. Respiratory: Negative for cough, shortness of breath and wheezing.    Cardiovascular: Negative for chest pain and leg (!) 124/52 -- -- 101 26 95 % --   04/14/19 1715 (!) 107/51 -- -- 102 27 95 % --   04/14/19 1700 105/63 -- -- 104 27 95 % --   04/14/19 1645 108/67 -- -- 106 25 95 % --   04/14/19 1630 122/60 -- -- 102 27 95 % --   04/14/19 1615 (!) 119/56 -- -- 102 28 94 % --   04/14/19 1600 126/64 98.4 °F (36.9 °C) Axillary 103 25 96 % --   04/14/19 1545 (!) 127/55 -- -- 102 24 96 % --   04/14/19 1530 (!) 126/56 -- -- 102 25 96 % --   04/14/19 1515 124/62 -- -- 100 16 99 % --   04/14/19 1507 -- -- -- 105 20 97 % --   04/14/19 1500 128/60 -- -- 102 25 96 % --   04/14/19 1445 (!) 130/58 98.4 °F (36.9 °C) Axillary 100 25 97 % --   04/14/19 1430 133/72 -- -- 103 26 97 % --   04/14/19 1415 (!) 123/49 -- -- 92 20 99 % --   04/14/19 1400 123/62 -- -- 94 20 98 % --   04/14/19 1345 121/62 -- -- 95 20 99 % --   04/14/19 1330 (!) 119/56 -- -- 95 21 99 % --   04/14/19 1315 (!) 118/55 -- -- 97 21 99 % --   04/14/19 1300 (!) 119/48 -- -- 99 20 99 % --   04/14/19 1250 (!) 114/54 98.4 °F (36.9 °C) Axillary 103 21 99 % --   04/14/19 1245 (!) 114/55 98.4 °F (36.9 °C) Axillary 105 23 99 % --   04/14/19 1238 (!) 113/58 98.2 °F (36.8 °C) Axillary 109 24 99 % --   04/14/19 1236 -- -- -- 109 23 99 % --   04/14/19 1235 (!) 113/58 -- -- 110 25 99 % --   04/14/19 1230 104/61 -- -- 111 25 99 % --   04/14/19 1223 115/61 98.4 °F (36.9 °C) Axillary 111 26 98 % --   04/14/19 1215 115/61 -- -- 111 26 98 % --   04/14/19 1200 120/66 -- -- 114 27 97 % --   04/14/19 1150 (!) 108/57 98.4 °F (36.9 °C) Axillary 110 22 97 % --   04/14/19 1145 (!) 108/57 -- -- 111 -- 97 % --   04/14/19 1130 (!) 111/55 -- -- 111 -- 98 % --   04/14/19 1115 (!) 109/56 -- -- 109 -- 98 % --   04/14/19 1102 -- -- -- 116 -- 98 % --   04/14/19 1100 (!) 103/58 -- -- 113 -- 98 % --   04/14/19 1045 120/62 -- -- 114 -- 99 % --   04/14/19 1030 120/64 -- -- 113 27 99 % --   04/14/19 1015 110/63 -- -- 116 29 -- --   04/14/19 1000 105/61 -- -- 119 (!) 32 96 % --   04/14/19 0945 (!) 84/53 -- -- 117 29 -- Multiplanar reformatted images are provided for review. Dose modulation, iterative reconstruction, and/or weight based adjustment of the mA/kV was utilized to reduce the radiation dose to as low as reasonably achievable. COMPARISON: None. HISTORY: ORDERING SYSTEM PROVIDED HISTORY: Abdominal pain TECHNOLOGIST PROVIDED HISTORY: IV Only Contrast Ordering Physician Provided Reason for Exam: patient c/o abd pain for an hour FINDINGS: Lower Chest: Trace pleural fluid bilaterally is noted. Indwelling cardiac pacemaker is present. Heart size is normal.  Coronary artery calcifications are evident. The esophagus is significantly dilated and fluid-filled. No paraesophageal adenopathy is evident. Organs: The spleen, pancreas, and adrenals are unremarkable. The liver contains hypodensities, likely cysts. The gallbladder is distended and contains a solitary gallstone. The kidneys excrete contrast bilaterally. Extrarenal collecting systems are noted. The ureters are mildly dilated down to the urinary bladder without evidence of intraluminal or ureteral obstructing calculi. GI/Bowel: Marked distention of the stomach is noted; this continues to the pylorus with appearance suggesting partial gastric outlet obstruction. Fluid is present in some small bowel loops and colon distal to the stomach. No small bowel obstruction. Pelvis: Marked distention of the urinary bladder is noted. 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 Distended ureters may be related to reflux or infection. No free pelvic fluid, pelvic or inguinal adenopathy is noted. Peritoneum/Retroperitoneum: No aortic aneurysm. Mild to moderate plaque is noted in the infrarenal abdominal aorta and both proximal iliac arteries. Shotty lymph nodes are present around the aorta in the upper abdomen. No mesenteric adenopathy is noted. Bones/Soft Tissues: Degenerative changes are present in the hips and lower lumbar facets. Left knee pain    Knee pain, left    Acute pain of left knee    Sepsis due to urinary tract infection (HCC)    Cystitis    Emphysematous cystitis    Septic shock (HCC)    Leukemoid reaction    Aspiration pneumonia of right lower lobe due to vomit ([facility]    MRSA carrier       Plan: abx per ID. Following. No changes to management urologically.      Electronically signed by Inna Dickson MD on 4/15/2019 at 7:27 AM

## 2019-04-15 NOTE — PLAN OF CARE
Problem: Risk for Impaired Skin Integrity  Goal: Tissue integrity - skin and mucous membranes  Description  Structural intactness and normal physiological function of skin and  mucous membranes. Note:   No area of skin breakdown noted. Reposition patient frequently to prevent skin breakdown      Problem: Falls - Risk of:  Goal: Will remain free from falls  Description  Will remain free from falls  Note:   No attempt to get oob. Safe environment provided. Bed down and locked. Uses call light appropriately      Problem: Restraint Use - Nonviolent/Non-Self-Destructive Behavior:  Goal: Absence of restraint indications  Description  Absence of restraint indications  4/15/2019 0710 by Susy Catherine RN  Note:   All tubes and lines remain intact. Pt reaches for ETT when restriants released. Reassurance given   4/15/2019 0700 by Suys Catherine RN  Note:   All tubes and lines remain intact. Pt reaches for ETT when restriants released.  Reassurance given

## 2019-04-15 NOTE — CONSULTS
Power wheelchair with left fupper extremity support  Dx: stroke with left hemiparesis. 15   Johanna Walker MD        Allergies:       Penicillins and Amoxicillin    Social History:     Tobacco:    reports that she has been smoking. She has a 30.00 pack-year smoking history. She has never used smokeless tobacco.  Alcohol:      reports that she drank about 16.8 oz of alcohol per week. Drug Use: reports that she does not use drugs. Family History:     History reviewed. No pertinent family history. Review of Systems:     Review of Systems   Unable to perform ROS: Intubated      at present patient is intubated and discussed with the nursing staff and reviewed the chart regarding review of systems. Code Status:  Full Code    Physical Exam:     Vitals:  /62   Pulse 102   Temp 99 °F (37.2 °C) (Oral)   Resp 24   Ht 5' (1.524 m)   Wt 126 lb 5.2 oz (57.3 kg)   SpO2 100%   BMI 24.67 kg/m²   Temp (24hrs), Av.1 °F (37.3 °C), Min:98.7 °F (37.1 °C), Max:99.9 °F (37.7 °C)      Physical Exam     Patient is intubated. Patient has signs of malnutrition. She is really built. Abdomen is softer. No acute distention. Bowel sounds decreased. Not able to feel liver. No other masses palpable. No significant ascites noted. No external hernias. Spray not palpable. Has some edema of the abdominal wall. Lungs expands poorly. Extremities edema. Skin has some bruising. No rash. Neurological exam, musculoskeletal exam could not be examined.             Data:   CBC:   Lab Results   Component Value Date    WBC 27.5 04/15/2019    RBC 3.01 04/15/2019    RBC 3.66 2012    HGB 8.2 04/15/2019    HCT 25.1 04/15/2019    MCV 85.7 04/15/2019    MCH 29.3 04/15/2019    MCHC 34.2 04/15/2019    RDW 13.7 04/15/2019     04/15/2019     2012    MPV 8.8 04/15/2019     CBC with Differential:  Lab Results   Component Value Date    WBC 27.5 04/15/2019    RBC 3.01 04/15/2019    RBC 3.66

## 2019-04-15 NOTE — PROGRESS NOTES
[address] [facility], [place].    PROGRESS NOTE             4/15/2019    7:40 AM    Name:   Olena Carrel  MRN:     [id number]     Acct:      [de-identified]   Room:   2002/2002-01  IP Day:  4  Admit Date:  4/11/2019  2:48 PM    PCP:  Giovanny Pena DO  Code Status:  Full Code    Subjective:     C/C: At present she does not respond to any questions    Interval History Status: worsened. Patient seen and examined at bedside in the ICU. Intubated, sedated. On Levophed. Off of vasopressin. Discussed removal of left knee dressing with nurse Lynn Palomares. We would prefer a soft ACE brace and to examine the knee. Discussed CT Abd/pelvis results with Dr. Tony Lieberman. Will consult GI to r/o gastric obstruction vs gastroparesis. Dietician to start TPN. Brief History:     Per EMR:     The patient is a 77 y.o.  Female who presents withAbdominal Pain   and she is admitted to the hospital for the management of abdominal distension, nausea, vomiting, and acute cystitis.      Patient presents with chills, nausea, vomiting, abdominal pain (diffuse, but worse in the suprapubic region), abdominal distension, and dysuria of 2-3 days duration. Denies hematemesis. Acknowledges difficulty keeping food down but tolerating fluids. States abdominal pain is a 6/10 on average, and denies trying any medication to alleviate her sx.      Denies recent antibiotic use or steroid use.      ED: Tachycardic 100-114 BP, WBC 47.9 w/neutrophils 88,  UCx from 4/2 + E. Coli > 100,000 w/suceptibility to cipro. Review of Systems:     Review of Systems   Unable to perform ROS: Intubated         Medications: Allergies: Allergies   Allergen Reactions    Penicillins Shortness Of Breath and Rash    Amoxicillin        Current Meds:   Scheduled Meds:    polyvinyl alcohol  1 drop Both Eyes Q4H    And    lubrifresh P.M.    Both Eyes Q4H    chlorhexidine  15 mL Mouth/Throat BID    ipratropium  0.5 mg Nebulization Q4H    methylPREDNISolone  60 mg Intravenous Q6H    insulin lispro  0-12 Units Subcutaneous Q6H    levalbuterol  1.25 mg Nebulization Q4H    docusate sodium  100 mg Oral TID    vancomycin  750 mg Intravenous Q24H    vancomycin (VANCOCIN) intermittent dosing (placeholder)   Other RX Placeholder    levofloxacin  500 mg Intravenous Q24H    meropenem  1 g Intravenous Q8H    famotidine (PEPCID) injection  20 mg Intravenous BID    venlafaxine  37.5 mg Oral TID    clopidogrel  75 mg Oral Daily    aspirin  81 mg Oral Daily    sodium chloride flush  10 mL Intravenous 2 times per day    enoxaparin  40 mg Subcutaneous Daily     Continuous Infusions:    propofol 30 mcg/kg/min (04/15/19 0422)    vasopressin (Septic Shock) infusion Stopped (04/14/19 1930)    norepinephrine 11 mcg/min (04/15/19 0419)    lactated ringers 125 mL/hr at 04/15/19 0252    dextrose       PRN Meds: fentanNYL, sodium chloride nebulizer, fentanNYL, oxyCODONE-acetaminophen, magnesium sulfate, sodium phosphate IVPB **OR** sodium phosphate IVPB, potassium chloride **OR** potassium alternative oral replacement **OR** potassium chloride, glucose, dextrose, glucagon (rDNA), dextrose, milk and molasses, sodium chloride flush, sodium chloride flush, acetaminophen, ketorolac    Data:     Past Medical History:   has a past medical history of Abnormal computed tomography of cervical spine, CVA (cerebral vascular accident) ([address] GERD (gastroesophageal reflux disease), Hypertension, Paraproteinemia, and Weight loss. Social History:   reports that she has been smoking. She has a 30.00 pack-year smoking history. She has never used smokeless tobacco. She reports that she drank about 16.8 oz of alcohol per week. She reports that she does not use drugs. Family History: History reviewed. No pertinent family history.     Vitals:  BP (!) 114/58   Pulse 90   Temp 98.7 °F (37.1 °C) (Oral)   Resp 21   Ht 5' (1.524 m) Wt 126 lb 5.2 oz (57.3 kg)   SpO2 99%   BMI 24.67 kg/m²   Temp (24hrs), Av.6 °F (37 °C), Min:98.2 °F (36.8 °C), Max:99.9 °F (37.7 °C)    Recent Labs     19  1211 19  2139 04/15/19  0054   POCGLU 94 132* 121* 123*       I/O(24Hr): Intake/Output Summary (Last 24 hours) at 4/15/2019 0740  Last data filed at 4/15/2019 0600  Gross per 24 hour   Intake 6906 ml   Output 5225 ml   Net 1681 ml       Labs:    [unfilled]    Lab Results   Component Value Date/Time    SPECIAL NOT REPORTED 2019 03:05 PM     Lab Results   Component Value Date/Time    CULTURE PENDING 2019 03:05 PM       [facility]    Radiology:    Xr Femur Left (min 2 Views)    Result Date: 3/27/2019  EXAMINATION: 4 XRAY VIEWS OF THE LEFT FEMUR; 2 XRAY VIEWS OF THE LEFT KNEE; 3 XRAY VIEWS OF THE LEFT TIBIA AND FIBULA 3/27/2019 7:00 pm COMPARISON: Left hip 2015. HISTORY: ORDERING SYSTEM PROVIDED HISTORY: pain TECHNOLOGIST PROVIDED HISTORY: pain Ordering Physician Provided Reason for Exam: pt twisted wrong, lt knee pain, unable to straighten knee. Acuity: Acute Type of Exam: Initial FINDINGS: Left femur, four views: The bones are diffusely osteopenic. No acute fracture deformity. The hip joint is maintained. The femoral head projects within the acetabulum. No focal soft tissue abnormality is seen. Left knee, two views: The knee is flexed. No acute osseous abnormality. No joint effusion. Joint spaces are not optimally profiled but no significant arthritic changes are apparent. Left tib-fib, three views: There is evidence of a remote healed distal tibia fracture. No acute fracture or dislocation is seen. No focal soft tissue abnormality. No acute osseous abnormality of the left femur. No acute osseous abnormality of the left knee. No acute osseous abnormality of the left tib-fib. Healed remote distal tibia fracture. Marked osteopenia, likely due to disuse.      Xr Knee Left (1-2 Views)    Result Date: 3/27/2019  EXAMINATION: 4 XRAY VIEWS OF THE LEFT FEMUR; 2 XRAY VIEWS OF THE LEFT KNEE; 3 XRAY VIEWS OF THE LEFT TIBIA AND FIBULA 3/27/2019 7:00 pm COMPARISON: Left hip 11/14/2015. HISTORY: ORDERING SYSTEM PROVIDED HISTORY: pain TECHNOLOGIST PROVIDED HISTORY: pain Ordering Physician Provided Reason for Exam: pt twisted wrong, lt knee pain, unable to straighten knee. Acuity: Acute Type of Exam: Initial FINDINGS: Left femur, four views: The bones are diffusely osteopenic. No acute fracture deformity. The hip joint is maintained. The femoral head projects within the acetabulum. No focal soft tissue abnormality is seen. Left knee, two views: The knee is flexed. No acute osseous abnormality. No joint effusion. Joint spaces are not optimally profiled but no significant arthritic changes are apparent. Left tib-fib, three views: There is evidence of a remote healed distal tibia fracture. No acute fracture or dislocation is seen. No focal soft tissue abnormality. No acute osseous abnormality of the left femur. No acute osseous abnormality of the left knee. No acute osseous abnormality of the left tib-fib. Healed remote distal tibia fracture. Marked osteopenia, likely due to disuse. Xr Knee Left (3 Views)    Result Date: 3/30/2019  EXAMINATION: 3 XRAY VIEWS OF THE LEFT KNEE 3/30/2019 10:58 am COMPARISON: March 27, 2018 HISTORY: ORDERING SYSTEM PROVIDED HISTORY: F/u L knee pain after cast TECHNOLOGIST PROVIDED HISTORY: AP, oblique, lateral F/u L knee pain after cast FINDINGS: Marked osteopenia. Interval casting, which degrades fine osseous detail question cortical offset in the lateral femoral metaphysis. No malalignment identified. No significant joint effusion. Interval casting. Question nondisplaced fracture in the lateral femoral metaphysis. Osteopenia.      Xr Tibia Fibula Left (2 Views)    Result Date: 3/27/2019  EXAMINATION: 4 XRAY VIEWS OF THE LEFT FEMUR; 2 XRAY VIEWS OF THE LEFT KNEE; 3 XRAY VIEWS OF THE LEFT TIBIA AND FIBULA 3/27/2019 7:00 pm COMPARISON: Left hip 11/14/2015. HISTORY: ORDERING SYSTEM PROVIDED HISTORY: pain TECHNOLOGIST PROVIDED HISTORY: pain Ordering Physician Provided Reason for Exam: pt twisted wrong, lt knee pain, unable to straighten knee. Acuity: Acute Type of Exam: Initial FINDINGS: Left femur, four views: The bones are diffusely osteopenic. No acute fracture deformity. The hip joint is maintained. The femoral head projects within the acetabulum. No focal soft tissue abnormality is seen. Left knee, two views: The knee is flexed. No acute osseous abnormality. No joint effusion. Joint spaces are not optimally profiled but no significant arthritic changes are apparent. Left tib-fib, three views: There is evidence of a remote healed distal tibia fracture. No acute fracture or dislocation is seen. No focal soft tissue abnormality. No acute osseous abnormality of the left femur. No acute osseous abnormality of the left knee. No acute osseous abnormality of the left tib-fib. Healed remote distal tibia fracture. Marked osteopenia, likely due to disuse. Ct Abdomen Pelvis W Iv Contrast    Result Date: 4/11/2019  EXAMINATION: CT OF THE ABDOMEN AND PELVIS WITH CONTRAST 4/11/2019 5:14 pm TECHNIQUE: CT of the abdomen and pelvis was performed with the administration of intravenous contrast. Multiplanar reformatted images are provided for review. Dose modulation, iterative reconstruction, and/or weight based adjustment of the mA/kV was utilized to reduce the radiation dose to as low as reasonably achievable. COMPARISON: None. HISTORY: ORDERING SYSTEM PROVIDED HISTORY: Abdominal pain TECHNOLOGIST PROVIDED HISTORY: IV Only Contrast Ordering Physician Provided Reason for Exam: patient c/o abd pain for an hour FINDINGS: Lower Chest: Trace pleural fluid bilaterally is noted. Indwelling cardiac pacemaker is present.   Heart size is normal.  Coronary artery calcifications are evident. The esophagus is significantly dilated and fluid-filled. No paraesophageal adenopathy is evident. Organs: The spleen, pancreas, and adrenals are unremarkable. The liver contains hypodensities, likely cysts. The gallbladder is distended and contains a solitary gallstone. The kidneys excrete contrast bilaterally. Extrarenal collecting systems are noted. The ureters are mildly dilated down to the urinary bladder without evidence of intraluminal or ureteral obstructing calculi. GI/Bowel: Marked distention of the stomach is noted; this continues to the pylorus with appearance suggesting partial gastric outlet obstruction. Fluid is present in some small bowel loops and colon distal to the stomach. No small bowel obstruction. Pelvis: Marked distention of the urinary bladder is noted. 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 Distended ureters may be related to reflux or infection. No free pelvic fluid, pelvic or inguinal adenopathy is noted. Peritoneum/Retroperitoneum: No aortic aneurysm. Mild to moderate plaque is noted in the infrarenal abdominal aorta and both proximal iliac arteries. Shotty lymph nodes are present around the aorta in the upper abdomen. No mesenteric adenopathy is noted. Bones/Soft Tissues: Degenerative changes are present in the hips and lower lumbar facets. Estimated biologic radiation dose for this procedure:258.77 mGy/cm2.     1. Dilatation of the thoracic esophagus filled with fluid. No obstructive process is noted. No adjacent enlarged lymph nodes. 2. Trace pleural fluid. 3. Marked distention of the stomach. This appears to extend to the pylorus. Partial gastric outlet obstruction is not excluded. 4. Bilateral dilated ureters without obstructing calculi. Urinary bladder is markedly distended with bladder wall air suggesting emphysematous cystitis. Dilatation of the ureters may be related to reflux or infection.  5. Atherosclerotic disease. 6. Other findings as above. Critical results were called by Dr. Helen Bingham MD to [address] on 4/11/2019 at 17:37. Xr Chest Portable    Result Date: 4/12/2019  EXAMINATION: SINGLE XRAY VIEW OF THE CHEST 4/12/2019 5:26 am COMPARISON: November 14, 2015. HISTORY: ORDERING SYSTEM PROVIDED HISTORY: line placement TECHNOLOGIST PROVIDED HISTORY: line placement Ordering Physician Provided Reason for Exam: New right side line placement. Acuity: Acute Type of Exam: Initial Additional signs and symptoms: New right side line placement. FINDINGS: Stable left pectoral trans venous cardiac pacer device. New right IJ central venous catheter with tip near the superior atrial caval junction. Normal lung volume. No new consolidation. Curvilinear radiopacity projecting over the right upper lobe likely represents artifact from a skin fold. No pleural effusion or pneumothorax. Stable cardiomediastinal silhouette and great vessels with redemonstration of atherosclerotic thoracic aorta. New right IJ central venous catheter with tip near the superior atrial caval junction. No pneumothorax. No new consolidation. Physical Examination:        Physical Exam   Constitutional: She appears well-developed. She appears cachectic. She appears ill. No distress. Intubated   HENT:   Head: Normocephalic and atraumatic. Eyes: Pupils are equal, round, and reactive to light. Conjunctivae and EOM are normal.   Neck: Normal range of motion. Right IJ central line in place. Site clean and dry. Cardiovascular: Normal rate, regular rhythm, normal heart sounds and intact distal pulses. Exam reveals no gallop and no friction rub. No murmur heard. Pulmonary/Chest: Effort normal. No stridor. No respiratory distress. She has no wheezes. She has rales. Abdominal: Soft. Bowel sounds are normal. She exhibits no mass. Tenderness: Diffuse, present only with palpation. There is no rebound and no guarding.    Musculoskeletal: Normal range of motion. She exhibits edema (Anasarca; 3+ b/l pitting edema in upper and lower ext. ). Left knee wrapped in dressing. Posterior bruising noted. Neurological:   Intubated, sedated   Skin: Skin is warm and dry. She is not diaphoretic. There is pallor. 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 Coli 50-100K on Meropenem   MRSA POSITIVE - on vanc   Legionella workup inconclusive - on Levaquin   CXR improvement in vasc congestion   CT Abd/Pelv: delayed gastric emptying, new mod b/l pleural effusions w/b/l lower lobe atelectasis; small amt free  fluid in pelvic cavity, anasarca, cholelithiasis   Levophed   ECHO 4/12: LVEF 45%, RVSP 36 mmHg   Urology consult, appreciate recs --> cont cath until out of ICU and stable   Critical care consult, appreciate recs   Gen Surg, right IJ central line placed on 4/12    Acute hypoxic resp failure 2/2 poss aspiration PNA   Intubated, sedated   Possible aspiration pneumonia, worsening on CT   ABGs: 7.483/32.6/88.6/24.4   On ABx, ID following   Pulm/CC following    Partial Gastric Outlet Obstruction vs Gastroparesis vs Ileus   Hx of BMs during this admission   CT Abd/pelvis indicative of gastric emptying   Persistent clinical abdominal distension   GI consult, appreciate recs   TPN    Anemia, resolved   Transfusion 1 U RBC on 4/14   Transfuse 1 U RBC if Hb < 7   Restarted Lovenox, INR wNL    Maintenance   Lovenox   Dulera, Xopenex   Continue home meds trazodone, ASA, Plavix, Norvasc, Effexor, Spiriva     Dispo   PT/OT Eval and treat   Social work for [id number] Jeanette Rondon MD  4/15/2019  7:40 AM     Attending Physician Statement    I have discussed the case of Fercho Esquivel, including pertinent history and exam findings with the resident. I have seen and examined the patient and the key elements of the encounter have been performed by me. I agree with the assessment, plan, and orders as documented by the resident.   Is difficult to my recent progress note, is a possibility of intra-abdominal malignancy or abdominal lymphoma  Electronically signed by Monique Vieira MD on 4/15/2019 at 3:11 PM

## 2019-04-15 NOTE — FLOWSHEET NOTE
04/15/19 1424   Encounter Summary   Services provided to: Patient   Referral/Consult From: Rounding   Complexity of Encounter Low   Length of Encounter 15 minutes   Spiritual/Yazidism   Type Spiritual support   Assessment Unable to respond   Intervention Prayer   Outcome Did not respond

## 2019-04-15 NOTE — PLAN OF CARE
Problem: Risk for Impaired Skin Integrity  Goal: Tissue integrity - skin and mucous membranes  Description  Structural intactness and normal physiological function of skin and  mucous membranes. 4/15/2019 1728 by Josr Woodward RN  Outcome: Ongoing  Note:   Patient turned and repositioned every two hours. Oral care provided every two hours. 4/15/2019 0700 by Ale Burgess RN  Note:   No area of skin breakdown noted. Reposition patient frequently to prevent skin breakdown      Problem: Falls - Risk of:  Goal: Will remain free from falls  Description  Will remain free from falls  4/15/2019 1728 by Josr Woodward RN  Outcome: Ongoing  Note:   Patient remained free from falls. Call light within reach. 4/15/2019 0710 by Ale Burgess RN  Note:   No attempt to get oob. Safe environment provided. Bed down and locked.  Uses call light appropriately   Goal: Absence of physical injury  Description  Absence of physical injury  Outcome: Ongoing     Problem: Infection, Septic Shock:  Goal: Will show no infection signs and symptoms  Description  Will show no infection signs and symptoms  Outcome: Ongoing     Problem: Tissue Perfusion, Altered:  Goal: Circulatory function within specified parameters  Description  Circulatory function within specified parameters  Outcome: Ongoing     Problem: Pain:  Goal: Pain level will decrease  Description  Pain level will decrease  Outcome: Ongoing  Goal: Control of acute pain  Description  Control of acute pain  Outcome: Ongoing  Goal: Control of chronic pain  Description  Control of chronic pain  Outcome: Ongoing     Problem: Nutrition  Goal: Optimal nutrition therapy  4/15/2019 1728 by Josr Woodward RN  Outcome: Ongoing  4/15/2019 1410 by Soila Gutierrez RD, LD  Outcome: Ongoing     Problem: Restraint Use - Nonviolent/Non-Self-Destructive Behavior:  Goal: Absence of restraint-related injury  Description  Absence of restraint-related injury  Outcome:

## 2019-04-15 NOTE — PROGRESS NOTES
Nutrition Assessment (Parenteral Nutrition)    Type and Reason for Visit: Consult(PN ordering and management)    Nutrition Recommendations: Continue NPO as ordered. Sodium acetate 50, Sodium chloride, Potassium acetate 20, potassium phosphate 15, potassium chloride 15,  Calcium gluconate 6, Magnesium sulfate 5, plus MVI and trace elements. Nutrition Assessment: Patient intubated and sedated, has large NGT output related to gastric outlet obstruction. Will start TPN and monitor tolerance and labs. Malnutrition Assessment:  · Malnutrition Status: At risk for malnutrition  · Context: Acute illness or injury  · Findings of the 6 clinical characteristics of malnutrition (Minimum of 2 out of 6 clinical characteristics is required to make the diagnosis of moderate or severe Protein Calorie Malnutrition based on AND/ASPEN Guidelines):  1. Weight Loss-No significant weight loss,    2. Fat Loss-Unable to assess(unable to do physical exam-RN didn't want her disturbed),    3. Muscle Loss-Unable to assess,    4. Fluid Accumulation-Moderate to severe fluid accumulation,    5.  Strength-Not measured    Nutrition Risk Level: Moderate    Nutrient Needs:  · Estimated Daily Total Kcal: 6212-2474 based on 30-32 kcal/kg admission wt  · Estimated Daily Protein (g): 56-60 gm pro based on 1.2-1.3 gm/kg admission wt    Nutrition Diagnosis:   · Problem: Inadequate oral intake  · Etiology: related to Alteration in GI function, Insufficient energy/nutrient consumption     Signs and symptoms:  as evidenced by NPO status due to medical condition, Nutrition support - PN    Objective Information:  · Nutrition-Focused Physical Findings: GI: non-distended, hypoactive bowel sounds.  Edema: +3 LUE, +1 LLE  · Wound Type: Pressure Ulcer, Stage I(Coccyx)  · Current Nutrition Therapies:  · Oral Diet Orders: NPO   · Oral Diet intake: NPO  · Oral Nutrition Supplement (ONS) Orders: Standard High Calorie Oral Supplement  · ONS intake: NPO  · Parenteral Nutrition Orders:  · Type and Formula: 2-in-1 Custom, Premix Central   · Lipids: 100ml  · Rate/Volume: 41.7 / 1,000  · Duration: Continuous  · Current PN Order Provides: 1,080 kcal, 50 gm protein  · Goal PN Orders Provides: 6550 - 1485 kcal, 56-60 gm protein  · Additional Calories: Propofol @ 10.3 ml/hr = 272 kcal  · Anthropometric Measures:  · Ht: 5' (152.4 cm)   · Current Body Wt: 126 lb 5.2 oz (57.3 kg)(weighn gain likely related to shifts in fluids)  · Admission Body Wt: 102 lb (46.3 kg)  · Ideal Body Wt: 100 lb (45.4 kg), % Ideal Body 102%  · BMI Classification: BMI 18.5 - 24.9 Normal Weight    Nutrition Interventions:   Continue NPO, Discontinue ONS  Continued Inpatient Monitoring    Nutrition Evaluation:   · Evaluation: No progress toward goals   · Goals: PN to meet greater 90% of estimated nutrition needs   · Monitoring: Nutrition Progression, PN Intake, PN Tolerance, Wound Healing, I&O, Weight, Pertinent Labs, Monitor Bowel Function      Lena Solano RD, LD  Office phone [phone]

## 2019-04-16 ENCOUNTER — APPOINTMENT (OUTPATIENT)
Dept: GENERAL RADIOLOGY | Age: 69
DRG: 853 | End: 2019-04-16
Payer: COMMERCIAL

## 2019-04-16 LAB
ABO/RH: NORMAL
ALBUMIN SERPL-MCNC: 1.7 G/DL (ref 3.5–5.2)
ALBUMIN/GLOBULIN RATIO: ABNORMAL (ref 1–2.5)
ALLEN TEST: ABNORMAL
ALP BLD-CCNC: 99 U/L (ref 35–104)
ALT SERPL-CCNC: 17 U/L (ref 5–33)
ANION GAP SERPL CALCULATED.3IONS-SCNC: 9 MMOL/L (ref 9–17)
ANTIBODY SCREEN: NEGATIVE
ARM BAND NUMBER: NORMAL
AST SERPL-CCNC: 36 U/L
BILIRUB SERPL-MCNC: 0.44 MG/DL (ref 0.3–1.2)
BLD PROD TYP BPU: NORMAL
BLD PROD TYP BPU: NORMAL
BLOOD BANK COMMENT: NORMAL
BUN BLDV-MCNC: 8 MG/DL (ref 8–23)
BUN/CREAT BLD: ABNORMAL (ref 9–20)
CALCIUM SERPL-MCNC: 7.1 MG/DL (ref 8.6–10.4)
CARBOXYHEMOGLOBIN: 0.3 % (ref 0–5)
CHLORIDE BLD-SCNC: 104 MMOL/L (ref 98–107)
CO2: 25 MMOL/L (ref 20–31)
CREAT SERPL-MCNC: <0.4 MG/DL (ref 0.5–0.9)
CROSSMATCH RESULT: NORMAL
CROSSMATCH RESULT: NORMAL
DISPENSE STATUS BLOOD BANK: NORMAL
DISPENSE STATUS BLOOD BANK: NORMAL
EXPIRATION DATE: NORMAL
FIO2: 50
GFR AFRICAN AMERICAN: ABNORMAL ML/MIN
GFR NON-AFRICAN AMERICAN: ABNORMAL ML/MIN
GFR SERPL CREATININE-BSD FRML MDRD: ABNORMAL ML/MIN/{1.73_M2}
GFR SERPL CREATININE-BSD FRML MDRD: ABNORMAL ML/MIN/{1.73_M2}
GLUCOSE BLD-MCNC: 156 MG/DL (ref 65–105)
GLUCOSE BLD-MCNC: 159 MG/DL (ref 65–105)
GLUCOSE BLD-MCNC: 160 MG/DL (ref 65–105)
GLUCOSE BLD-MCNC: 165 MG/DL (ref 65–105)
GLUCOSE BLD-MCNC: 165 MG/DL (ref 65–105)
GLUCOSE BLD-MCNC: 168 MG/DL (ref 70–99)
GLUCOSE BLD-MCNC: 194 MG/DL (ref 65–105)
HCO3 ARTERIAL: 26.3 MMOL/L (ref 22–26)
HCT VFR BLD CALC: 21.2 % (ref 36–46)
HCT VFR BLD CALC: 21.7 % (ref 36–46)
HCT VFR BLD CALC: 22.6 % (ref 36–46)
HCT VFR BLD CALC: 33.5 % (ref 36–46)
HEMOGLOBIN: 11.2 G/DL (ref 12–16)
HEMOGLOBIN: 7.1 G/DL (ref 12–16)
HEMOGLOBIN: 7.2 G/DL (ref 12–16)
HEMOGLOBIN: 7.3 G/DL (ref 12–16)
INR BLD: 1
MAGNESIUM: 1.6 MG/DL (ref 1.6–2.6)
MCH RBC QN AUTO: 29.1 PG (ref 26–34)
MCHC RBC AUTO-ENTMCNC: 33.9 G/DL (ref 31–37)
MCV RBC AUTO: 85.9 FL (ref 80–100)
METHEMOGLOBIN: 0.6 % (ref 0–1.9)
MODE: ABNORMAL
NEGATIVE BASE EXCESS, ART: ABNORMAL MMOL/L (ref 0–2)
NOTIFICATION TIME: ABNORMAL
NOTIFICATION: ABNORMAL
NRBC AUTOMATED: ABNORMAL PER 100 WBC
O2 DEVICE/FLOW/%: ABNORMAL
O2 SAT, ARTERIAL: 98.6 % (ref 95–98)
OXYHEMOGLOBIN: ABNORMAL % (ref 95–98)
PATIENT TEMP: 37
PCO2 ARTERIAL: 32.8 MMHG (ref 35–45)
PCO2, ART, TEMP ADJ: ABNORMAL (ref 35–45)
PDW BLD-RTO: 14 % (ref 11.5–14.9)
PEEP/CPAP: 7
PH ARTERIAL: 7.51 (ref 7.35–7.45)
PH, ART, TEMP ADJ: ABNORMAL (ref 7.35–7.45)
PHOSPHORUS: 0.7 MG/DL (ref 2.6–4.5)
PLATELET # BLD: 192 K/UL (ref 150–450)
PMV BLD AUTO: 8.5 FL (ref 6–12)
PO2 ARTERIAL: 147 MMHG (ref 80–100)
PO2, ART, TEMP ADJ: ABNORMAL MMHG (ref 80–100)
POSITIVE BASE EXCESS, ART: 3.3 MMOL/L (ref 0–2)
POTASSIUM SERPL-SCNC: 2.9 MMOL/L (ref 3.7–5.3)
PREALBUMIN: 4.8 MG/DL (ref 20–40)
PROTHROMBIN TIME: 13.5 SEC (ref 11.8–14.6)
PSV: ABNORMAL
PT. POSITION: ABNORMAL
RBC # BLD: 2.47 M/UL (ref 4–5.2)
RESPIRATORY RATE: ABNORMAL
SAMPLE SITE: ABNORMAL
SET RATE: 16
SODIUM BLD-SCNC: 138 MMOL/L (ref 135–144)
TEXT FOR RESPIRATORY: ABNORMAL
TOTAL HB: ABNORMAL G/DL (ref 12–16)
TOTAL PROTEIN: 3.8 G/DL (ref 6.4–8.3)
TOTAL RATE: 16
TRANSFUSION STATUS: NORMAL
TRANSFUSION STATUS: NORMAL
UNIT DIVISION: 0
UNIT DIVISION: 0
UNIT NUMBER: NORMAL
UNIT NUMBER: NORMAL
VT: 450
WBC # BLD: 18.6 K/UL (ref 3.5–11)

## 2019-04-16 PROCEDURE — 82805 BLOOD GASES W/O2 SATURATION: CPT

## 2019-04-16 PROCEDURE — 6360000002 HC RX W HCPCS: Performed by: INTERNAL MEDICINE

## 2019-04-16 PROCEDURE — 94640 AIRWAY INHALATION TREATMENT: CPT

## 2019-04-16 PROCEDURE — 83735 ASSAY OF MAGNESIUM: CPT

## 2019-04-16 PROCEDURE — 36415 COLL VENOUS BLD VENIPUNCTURE: CPT

## 2019-04-16 PROCEDURE — 6360000002 HC RX W HCPCS: Performed by: STUDENT IN AN ORGANIZED HEALTH CARE EDUCATION/TRAINING PROGRAM

## 2019-04-16 PROCEDURE — P9016 RBC LEUKOCYTES REDUCED: HCPCS

## 2019-04-16 PROCEDURE — 2580000003 HC RX 258: Performed by: INTERNAL MEDICINE

## 2019-04-16 PROCEDURE — 94770 HC ETCO2 MONITOR DAILY: CPT

## 2019-04-16 PROCEDURE — 2500000003 HC RX 250 WO HCPCS: Performed by: INTERNAL MEDICINE

## 2019-04-16 PROCEDURE — C9113 INJ PANTOPRAZOLE SODIUM, VIA: HCPCS | Performed by: STUDENT IN AN ORGANIZED HEALTH CARE EDUCATION/TRAINING PROGRAM

## 2019-04-16 PROCEDURE — 86920 COMPATIBILITY TEST SPIN: CPT

## 2019-04-16 PROCEDURE — 94762 N-INVAS EAR/PLS OXIMTRY CONT: CPT

## 2019-04-16 PROCEDURE — 80053 COMPREHEN METABOLIC PANEL: CPT

## 2019-04-16 PROCEDURE — 6370000000 HC RX 637 (ALT 250 FOR IP): Performed by: INTERNAL MEDICINE

## 2019-04-16 PROCEDURE — 71045 X-RAY EXAM CHEST 1 VIEW: CPT

## 2019-04-16 PROCEDURE — 85014 HEMATOCRIT: CPT

## 2019-04-16 PROCEDURE — 85018 HEMOGLOBIN: CPT

## 2019-04-16 PROCEDURE — 99231 SBSQ HOSP IP/OBS SF/LOW 25: CPT | Performed by: ORTHOPAEDIC SURGERY

## 2019-04-16 PROCEDURE — 86900 BLOOD TYPING SEROLOGIC ABO: CPT

## 2019-04-16 PROCEDURE — 99233 SBSQ HOSP IP/OBS HIGH 50: CPT | Performed by: INTERNAL MEDICINE

## 2019-04-16 PROCEDURE — 2700000000 HC OXYGEN THERAPY PER DAY

## 2019-04-16 PROCEDURE — 2580000003 HC RX 258: Performed by: NURSE PRACTITIONER

## 2019-04-16 PROCEDURE — 36600 WITHDRAWAL OF ARTERIAL BLOOD: CPT

## 2019-04-16 PROCEDURE — 84100 ASSAY OF PHOSPHORUS: CPT

## 2019-04-16 PROCEDURE — 43235 EGD DIAGNOSTIC BRUSH WASH: CPT | Performed by: INTERNAL MEDICINE

## 2019-04-16 PROCEDURE — 85610 PROTHROMBIN TIME: CPT

## 2019-04-16 PROCEDURE — 97110 THERAPEUTIC EXERCISES: CPT

## 2019-04-16 PROCEDURE — 86901 BLOOD TYPING SEROLOGIC RH(D): CPT

## 2019-04-16 PROCEDURE — 0DJ08ZZ INSPECTION OF UPPER INTESTINAL TRACT, VIA NATURAL OR ARTIFICIAL OPENING ENDOSCOPIC: ICD-10-PCS | Performed by: INTERNAL MEDICINE

## 2019-04-16 PROCEDURE — 2500000003 HC RX 250 WO HCPCS: Performed by: NURSE PRACTITIONER

## 2019-04-16 PROCEDURE — 2580000003 HC RX 258: Performed by: STUDENT IN AN ORGANIZED HEALTH CARE EDUCATION/TRAINING PROGRAM

## 2019-04-16 PROCEDURE — 2709999900 HC NON-CHARGEABLE SUPPLY: Performed by: INTERNAL MEDICINE

## 2019-04-16 PROCEDURE — 2000000000 HC ICU R&B

## 2019-04-16 PROCEDURE — 85027 COMPLETE CBC AUTOMATED: CPT

## 2019-04-16 PROCEDURE — 84134 ASSAY OF PREALBUMIN: CPT

## 2019-04-16 PROCEDURE — 3609017100 HC EGD: Performed by: INTERNAL MEDICINE

## 2019-04-16 PROCEDURE — 94003 VENT MGMT INPAT SUBQ DAY: CPT

## 2019-04-16 PROCEDURE — 6360000002 HC RX W HCPCS: Performed by: NURSE PRACTITIONER

## 2019-04-16 PROCEDURE — 86850 RBC ANTIBODY SCREEN: CPT

## 2019-04-16 RX ORDER — PANTOPRAZOLE SODIUM 40 MG/10ML
40 INJECTION, POWDER, LYOPHILIZED, FOR SOLUTION INTRAVENOUS DAILY
Status: DISCONTINUED | OUTPATIENT
Start: 2019-04-16 | End: 2019-04-29

## 2019-04-16 RX ORDER — 0.9 % SODIUM CHLORIDE 0.9 %
250 INTRAVENOUS SOLUTION INTRAVENOUS ONCE
Status: DISCONTINUED | OUTPATIENT
Start: 2019-04-16 | End: 2019-04-29

## 2019-04-16 RX ORDER — 0.9 % SODIUM CHLORIDE 0.9 %
250 INTRAVENOUS SOLUTION INTRAVENOUS ONCE
Status: COMPLETED | OUTPATIENT
Start: 2019-04-16 | End: 2019-04-16

## 2019-04-16 RX ORDER — 0.9 % SODIUM CHLORIDE 0.9 %
10 VIAL (ML) INJECTION DAILY
Status: DISCONTINUED | OUTPATIENT
Start: 2019-04-16 | End: 2019-04-29

## 2019-04-16 RX ORDER — METHYLPREDNISOLONE SODIUM SUCCINATE 40 MG/ML
40 INJECTION, POWDER, LYOPHILIZED, FOR SOLUTION INTRAMUSCULAR; INTRAVENOUS EVERY 8 HOURS
Status: DISCONTINUED | OUTPATIENT
Start: 2019-04-16 | End: 2019-04-20

## 2019-04-16 RX ADMIN — INSULIN LISPRO 2 UNITS: 100 INJECTION, SOLUTION INTRAVENOUS; SUBCUTANEOUS at 20:32

## 2019-04-16 RX ADMIN — IPRATROPIUM BROMIDE 0.5 MG: 0.5 SOLUTION RESPIRATORY (INHALATION) at 07:21

## 2019-04-16 RX ADMIN — SODIUM CHLORIDE, POTASSIUM CHLORIDE, SODIUM LACTATE AND CALCIUM CHLORIDE: 600; 310; 30; 20 INJECTION, SOLUTION INTRAVENOUS at 05:03

## 2019-04-16 RX ADMIN — Medication 10 ML: at 20:13

## 2019-04-16 RX ADMIN — SODIUM CHLORIDE 250 ML: 9 INJECTION, SOLUTION INTRAVENOUS at 12:26

## 2019-04-16 RX ADMIN — METHYLPREDNISOLONE SODIUM SUCCINATE 40 MG: 40 INJECTION, POWDER, FOR SOLUTION INTRAMUSCULAR; INTRAVENOUS at 02:54

## 2019-04-16 RX ADMIN — MAGNESIUM SULFATE HEPTAHYDRATE 1 G: 1 INJECTION, SOLUTION INTRAVENOUS at 06:26

## 2019-04-16 RX ADMIN — LEVALBUTEROL 1.25 MG: 1.25 SOLUTION, CONCENTRATE RESPIRATORY (INHALATION) at 15:12

## 2019-04-16 RX ADMIN — LEVALBUTEROL 1.25 MG: 1.25 SOLUTION, CONCENTRATE RESPIRATORY (INHALATION) at 02:46

## 2019-04-16 RX ADMIN — PROPOFOL 30 MCG/KG/MIN: 10 INJECTION, EMULSION INTRAVENOUS at 17:41

## 2019-04-16 RX ADMIN — SODIUM CHLORIDE, POTASSIUM CHLORIDE, SODIUM LACTATE AND CALCIUM CHLORIDE: 600; 310; 30; 20 INJECTION, SOLUTION INTRAVENOUS at 20:30

## 2019-04-16 RX ADMIN — VANCOMYCIN HYDROCHLORIDE 1000 MG: 1 INJECTION, POWDER, LYOPHILIZED, FOR SOLUTION INTRAVENOUS at 13:27

## 2019-04-16 RX ADMIN — INSULIN LISPRO 2 UNITS: 100 INJECTION, SOLUTION INTRAVENOUS; SUBCUTANEOUS at 09:20

## 2019-04-16 RX ADMIN — NOREPINEPHRINE BITARTRATE 2 MCG/MIN: 1 INJECTION INTRAVENOUS at 02:54

## 2019-04-16 RX ADMIN — POLYVINYL ALCOHOL 1 DROP: 14 SOLUTION/ DROPS OPHTHALMIC at 05:03

## 2019-04-16 RX ADMIN — MINERAL OIL AND WHITE PETROLATUM: 150; 830 OINTMENT OPHTHALMIC at 05:03

## 2019-04-16 RX ADMIN — IPRATROPIUM BROMIDE 0.5 MG: 0.5 SOLUTION RESPIRATORY (INHALATION) at 19:02

## 2019-04-16 RX ADMIN — MAGNESIUM SULFATE HEPTAHYDRATE 1 G: 1 INJECTION, SOLUTION INTRAVENOUS at 05:04

## 2019-04-16 RX ADMIN — POLYVINYL ALCOHOL 1 DROP: 14 SOLUTION/ DROPS OPHTHALMIC at 20:33

## 2019-04-16 RX ADMIN — IPRATROPIUM BROMIDE 0.5 MG: 0.5 SOLUTION RESPIRATORY (INHALATION) at 11:19

## 2019-04-16 RX ADMIN — IPRATROPIUM BROMIDE 0.5 MG: 0.5 SOLUTION RESPIRATORY (INHALATION) at 02:46

## 2019-04-16 RX ADMIN — CHLORHEXIDINE GLUCONATE 0.12% ORAL RINSE 15 ML: 1.2 LIQUID ORAL at 08:07

## 2019-04-16 RX ADMIN — CALCIUM GLUCONATE: 94 INJECTION, SOLUTION INTRAVENOUS at 17:54

## 2019-04-16 RX ADMIN — MINERAL OIL AND WHITE PETROLATUM: 150; 830 OINTMENT OPHTHALMIC at 20:33

## 2019-04-16 RX ADMIN — SODIUM PHOSPHATE, MONOBASIC, MONOHYDRATE 14.85 MMOL: 276; 142 INJECTION, SOLUTION INTRAVENOUS at 07:05

## 2019-04-16 RX ADMIN — LEVALBUTEROL 1.25 MG: 1.25 SOLUTION, CONCENTRATE RESPIRATORY (INHALATION) at 19:02

## 2019-04-16 RX ADMIN — POTASSIUM CHLORIDE 10 MEQ: 7.46 INJECTION, SOLUTION INTRAVENOUS at 12:27

## 2019-04-16 RX ADMIN — POLYVINYL ALCOHOL 1 DROP: 14 SOLUTION/ DROPS OPHTHALMIC at 09:20

## 2019-04-16 RX ADMIN — PROPOFOL 30 MCG/KG/MIN: 10 INJECTION, EMULSION INTRAVENOUS at 09:17

## 2019-04-16 RX ADMIN — MINERAL OIL AND WHITE PETROLATUM: 150; 830 OINTMENT OPHTHALMIC at 09:20

## 2019-04-16 RX ADMIN — LEVALBUTEROL 1.25 MG: 1.25 SOLUTION, CONCENTRATE RESPIRATORY (INHALATION) at 23:21

## 2019-04-16 RX ADMIN — LEVALBUTEROL 1.25 MG: 1.25 SOLUTION, CONCENTRATE RESPIRATORY (INHALATION) at 11:19

## 2019-04-16 RX ADMIN — POTASSIUM CHLORIDE 10 MEQ: 7.46 INJECTION, SOLUTION INTRAVENOUS at 08:00

## 2019-04-16 RX ADMIN — POTASSIUM CHLORIDE 10 MEQ: 7.46 INJECTION, SOLUTION INTRAVENOUS at 09:17

## 2019-04-16 RX ADMIN — POTASSIUM CHLORIDE 10 MEQ: 7.46 INJECTION, SOLUTION INTRAVENOUS at 05:03

## 2019-04-16 RX ADMIN — Medication 10 ML: at 09:18

## 2019-04-16 RX ADMIN — FENTANYL CITRATE 50 MCG: 50 INJECTION INTRAMUSCULAR; INTRAVENOUS at 14:58

## 2019-04-16 RX ADMIN — INSULIN LISPRO 2 UNITS: 100 INJECTION, SOLUTION INTRAVENOUS; SUBCUTANEOUS at 02:54

## 2019-04-16 RX ADMIN — METHYLPREDNISOLONE SODIUM SUCCINATE 40 MG: 40 INJECTION, POWDER, FOR SOLUTION INTRAMUSCULAR; INTRAVENOUS at 17:34

## 2019-04-16 RX ADMIN — LEVALBUTEROL 1.25 MG: 1.25 SOLUTION, CONCENTRATE RESPIRATORY (INHALATION) at 07:21

## 2019-04-16 RX ADMIN — POTASSIUM CHLORIDE 10 MEQ: 7.46 INJECTION, SOLUTION INTRAVENOUS at 06:26

## 2019-04-16 RX ADMIN — IPRATROPIUM BROMIDE 0.5 MG: 0.5 SOLUTION RESPIRATORY (INHALATION) at 15:12

## 2019-04-16 RX ADMIN — METHYLPREDNISOLONE SODIUM SUCCINATE 40 MG: 40 INJECTION, POWDER, FOR SOLUTION INTRAMUSCULAR; INTRAVENOUS at 09:17

## 2019-04-16 RX ADMIN — LEVOFLOXACIN 500 MG: 5 INJECTION, SOLUTION INTRAVENOUS at 13:26

## 2019-04-16 RX ADMIN — INSULIN LISPRO 2 UNITS: 100 INJECTION, SOLUTION INTRAVENOUS; SUBCUTANEOUS at 13:27

## 2019-04-16 RX ADMIN — IPRATROPIUM BROMIDE 0.5 MG: 0.5 SOLUTION RESPIRATORY (INHALATION) at 23:20

## 2019-04-16 RX ADMIN — PANTOPRAZOLE SODIUM 40 MG: 40 INJECTION, POWDER, FOR SOLUTION INTRAVENOUS at 09:18

## 2019-04-16 RX ADMIN — CHLORHEXIDINE GLUCONATE 0.12% ORAL RINSE 15 ML: 1.2 LIQUID ORAL at 20:01

## 2019-04-16 RX ADMIN — POTASSIUM CHLORIDE 10 MEQ: 7.46 INJECTION, SOLUTION INTRAVENOUS at 10:49

## 2019-04-16 ASSESSMENT — PULMONARY FUNCTION TESTS
PIF_VALUE: 7
PIF_VALUE: 10
PIF_VALUE: 7
PIF_VALUE: 9
PIF_VALUE: 11
PIF_VALUE: 15
PIF_VALUE: 16
PIF_VALUE: 10
PIF_VALUE: 7
PIF_VALUE: 13
PIF_VALUE: 10
PIF_VALUE: 13
PIF_VALUE: 12
PIF_VALUE: 11
PIF_VALUE: 8
PIF_VALUE: 17
PIF_VALUE: 10
PIF_VALUE: 8
PIF_VALUE: 14
PIF_VALUE: 15
PIF_VALUE: 16
PIF_VALUE: 7
PIF_VALUE: 6
PIF_VALUE: 8
PIF_VALUE: 14
PIF_VALUE: 9
PIF_VALUE: 7
PIF_VALUE: 16
PIF_VALUE: 10
PIF_VALUE: 8
PIF_VALUE: 10
PIF_VALUE: 10
PIF_VALUE: 9
PIF_VALUE: 9
PIF_VALUE: 10

## 2019-04-16 ASSESSMENT — PAIN SCALES - GENERAL
PAINLEVEL_OUTOF10: 4
PAINLEVEL_OUTOF10: 2
PAINLEVEL_OUTOF10: 0

## 2019-04-16 NOTE — CARE COORDINATION
ONGOING DISCHARGE PLAN:    Reviewed patients chart regarding discharge plan and chart still confirms the plan is to  Discharge to ecf vs home   Per ID   Continue IV Levaquin and Vancomycin dose was adjusted due to low trough level . Follow sputum and final blood culture  . Follow CBC ,vancomycin level and renal function closely . Continue supportive care . Will review plan with patient and or family  Patient remains on the vent    Will continue to follow for additional discharge needs.      Electronically signed by Verena Paget, RN on 4/16/2019 at 11:03 AM

## 2019-04-16 NOTE — PROGRESS NOTES
[address] [facility].    PROGRESS NOTE             4/16/2019    7:42 AM    Name:   Lane Lawler  MRN:     [id number]     Acct:      [de-identified]   Room:   2002/2002-01  IP Day:  5  Admit Date:  4/11/2019  2:48 PM    PCP:  Gurinder Livingston DO  Code Status:  Full Code    Subjective: Interval History Status: worsened. Patient seen and examined at bedside in the ICU. Intubated. FiO2 @ 40%. Off of pressors and propofol this AM.  BP 70s/50s. Ortho resident present. Discussed recasting of the left knee and increased packing. Plan for EGD this AM.  Discussed with nurse Nacho Healy to F/U with GI and assess for need for transfusion and bolus prior to procedure. Hb this AM 7.1. Brief History:     Per EMR:     The patient is a 77 y.o.  Female who presents withAbdominal Pain   and she is admitted to the hospital for the management of abdominal distension, nausea, vomiting, and acute cystitis.      Patient presents with chills, nausea, vomiting, abdominal pain (diffuse, but worse in the suprapubic region), abdominal distension, and dysuria of 2-3 days duration. Denies hematemesis. Acknowledges difficulty keeping food down but tolerating fluids. States abdominal pain is a 6/10 on average, and denies trying any medication to alleviate her sx.      Denies recent antibiotic use or steroid use.      ED: Tachycardic 100-114 BP, WBC 47.9 w/neutrophils 88,  UCx from 4/2 + E. Coli > 100,000 w/suceptibility to cipro. Review of Systems:     Review of Systems   Unable to perform ROS: Intubated         Medications: Allergies: Allergies   Allergen Reactions    Penicillins Shortness Of Breath and Rash    Amoxicillin        Current Meds:   Scheduled Meds:    methylPREDNISolone  40 mg Intravenous Q6H    vancomycin  1,000 mg Intravenous Q12H    polyvinyl alcohol  1 drop Both Eyes Q4H    And    lubrifresh P.M.    Both Eyes Q4H    chlorhexidine  15 mL Mouth/Throat BID    ipratropium  0.5 mg Nebulization Q4H    insulin lispro  0-12 Units Subcutaneous Q6H    levalbuterol  1.25 mg Nebulization Q4H    docusate sodium  100 mg Oral TID    vancomycin (VANCOCIN) intermittent dosing (placeholder)   Other RX Placeholder    levofloxacin  500 mg Intravenous Q24H    famotidine (PEPCID) injection  20 mg Intravenous BID    venlafaxine  37.5 mg Oral TID    clopidogrel  75 mg Oral Daily    aspirin  81 mg Oral Daily    sodium chloride flush  10 mL Intravenous 2 times per day    enoxaparin  40 mg Subcutaneous Daily     Continuous Infusions:    PN-Adult 2-in-1 Central Line (Standard) 41.7 mL/hr at 04/15/19 1750    fat emulsion Stopped (04/16/19 0550)    propofol 30 mcg/kg/min (04/15/19 2350)    vasopressin (Septic Shock) infusion Stopped (04/14/19 1930)    norepinephrine 1 mcg/min (04/16/19 0508)    lactated ringers 125 mL/hr at 04/16/19 0503    dextrose       PRN Meds: fentanNYL, sodium chloride nebulizer, fentanNYL, oxyCODONE-acetaminophen, magnesium sulfate, sodium phosphate IVPB **OR** sodium phosphate IVPB, potassium chloride **OR** potassium alternative oral replacement **OR** potassium chloride, glucose, dextrose, glucagon (rDNA), dextrose, milk and molasses, sodium chloride flush, acetaminophen, ketorolac    Data:     Past Medical History:   has a past medical history of Abnormal computed tomography of cervical spine, CVA (cerebral vascular accident) ([address] GERD (gastroesophageal reflux disease), Hypertension, Paraproteinemia, and Weight loss. Social History:   reports that she has been smoking. She has a 30.00 pack-year smoking history. She has never used smokeless tobacco. She reports that she drank about 16.8 oz of alcohol per week. She reports that she does not use drugs. Family History: History reviewed. No pertinent family history.     Vitals:  BP (!) 97/40   Pulse 88   Temp 96.7 °F (35.9 °C) (Axillary)   Resp 22   Ht 5' (1.524 m)   Wt 126 lb 5.2 oz (57.3 kg)   SpO2 100%   BMI 24.67 kg/m²   Temp (24hrs), Av.4 °F (36.9 °C), Min:96.7 °F (35.9 °C), Max:99 °F (37.2 °C)    Recent Labs     04/15/19  1738 04/15/19  2013 04/16/19  02519  0711   POCGLU 119* 156* 165* 194*       I/O(24Hr): Intake/Output Summary (Last 24 hours) at 2019 0742  Last data filed at 2019 0527  Gross per 24 hour   Intake 5166.06 ml   Output 3425 ml   Net 1741.06 ml       Labs:    [unfilled]    Lab Results   Component Value Date/Time    SPECIAL NOT REPORTED 2019 03:05 PM     Lab Results   Component Value Date/Time    CULTURE YEAST  MODERATE GROWTH   2019 03:05 PM       [unfilled]    Radiology:    Adeline Alvarez Femur Left (min 2 Views)    Result Date: 3/27/2019  EXAMINATION: 4 XRAY VIEWS OF THE LEFT FEMUR; 2 XRAY VIEWS OF THE LEFT KNEE; 3 XRAY VIEWS OF THE LEFT TIBIA AND FIBULA 3/27/2019 7:00 pm COMPARISON: Left hip 2015. HISTORY: ORDERING SYSTEM PROVIDED HISTORY: pain TECHNOLOGIST PROVIDED HISTORY: pain Ordering Physician Provided Reason for Exam: pt twisted wrong, lt knee pain, unable to straighten knee. Acuity: Acute Type of Exam: Initial FINDINGS: Left femur, four views: The bones are diffusely osteopenic. No acute fracture deformity. The hip joint is maintained. The femoral head projects within the acetabulum. No focal soft tissue abnormality is seen. Left knee, two views: The knee is flexed. No acute osseous abnormality. No joint effusion. Joint spaces are not optimally profiled but no significant arthritic changes are apparent. Left tib-fib, three views: There is evidence of a remote healed distal tibia fracture. No acute fracture or dislocation is seen. No focal soft tissue abnormality. No acute osseous abnormality of the left femur. No acute osseous abnormality of the left knee. No acute osseous abnormality of the left tib-fib. Healed remote distal tibia fracture. Marked osteopenia, likely due to disuse. Xr Knee Left (1-2 Views)    Result Date: 3/27/2019  EXAMINATION: 4 XRAY VIEWS OF THE LEFT FEMUR; 2 XRAY VIEWS OF THE LEFT KNEE; 3 XRAY VIEWS OF THE LEFT TIBIA AND FIBULA 3/27/2019 7:00 pm COMPARISON: Left hip 11/14/2015. HISTORY: ORDERING SYSTEM PROVIDED HISTORY: pain TECHNOLOGIST PROVIDED HISTORY: pain Ordering Physician Provided Reason for Exam: pt twisted wrong, lt knee pain, unable to straighten knee. Acuity: Acute Type of Exam: Initial FINDINGS: Left femur, four views: The bones are diffusely osteopenic. No acute fracture deformity. The hip joint is maintained. The femoral head projects within the acetabulum. No focal soft tissue abnormality is seen. Left knee, two views: The knee is flexed. No acute osseous abnormality. No joint effusion. Joint spaces are not optimally profiled but no significant arthritic changes are apparent. Left tib-fib, three views: There is evidence of a remote healed distal tibia fracture. No acute fracture or dislocation is seen. No focal soft tissue abnormality. No acute osseous abnormality of the left femur. No acute osseous abnormality of the left knee. No acute osseous abnormality of the left tib-fib. Healed remote distal tibia fracture. Marked osteopenia, likely due to disuse. Xr Knee Left (3 Views)    Result Date: 3/30/2019  EXAMINATION: 3 XRAY VIEWS OF THE LEFT KNEE 3/30/2019 10:58 am COMPARISON: March 27, 2018 HISTORY: ORDERING SYSTEM PROVIDED HISTORY: F/u L knee pain after cast TECHNOLOGIST PROVIDED HISTORY: AP, oblique, lateral F/u L knee pain after cast FINDINGS: Marked osteopenia. Interval casting, which degrades fine osseous detail question cortical offset in the lateral femoral metaphysis. No malalignment identified. No significant joint effusion. Interval casting. Question nondisplaced fracture in the lateral femoral metaphysis. Osteopenia.      Xr Tibia Fibula Left (2 Views)    Result Date: 3/27/2019  EXAMINATION: 4 XRAY VIEWS OF THE LEFT FEMUR; 2 XRAY VIEWS OF THE LEFT KNEE; 3 XRAY VIEWS OF THE LEFT TIBIA AND FIBULA 3/27/2019 7:00 pm COMPARISON: Left hip 11/14/2015. HISTORY: ORDERING SYSTEM PROVIDED HISTORY: pain TECHNOLOGIST PROVIDED HISTORY: pain Ordering Physician Provided Reason for Exam: pt twisted wrong, lt knee pain, unable to straighten knee. Acuity: Acute Type of Exam: Initial FINDINGS: Left femur, four views: The bones are diffusely osteopenic. No acute fracture deformity. The hip joint is maintained. The femoral head projects within the acetabulum. No focal soft tissue abnormality is seen. Left knee, two views: The knee is flexed. No acute osseous abnormality. No joint effusion. Joint spaces are not optimally profiled but no significant arthritic changes are apparent. Left tib-fib, three views: There is evidence of a remote healed distal tibia fracture. No acute fracture or dislocation is seen. No focal soft tissue abnormality. No acute osseous abnormality of the left femur. No acute osseous abnormality of the left knee. No acute osseous abnormality of the left tib-fib. Healed remote distal tibia fracture. Marked osteopenia, likely due to disuse. Ct Abdomen Pelvis W Iv Contrast    Result Date: 4/11/2019  EXAMINATION: CT OF THE ABDOMEN AND PELVIS WITH CONTRAST 4/11/2019 5:14 pm TECHNIQUE: CT of the abdomen and pelvis was performed with the administration of intravenous contrast. Multiplanar reformatted images are provided for review. Dose modulation, iterative reconstruction, and/or weight based adjustment of the mA/kV was utilized to reduce the radiation dose to as low as reasonably achievable. COMPARISON: None. HISTORY: ORDERING SYSTEM PROVIDED HISTORY: Abdominal pain TECHNOLOGIST PROVIDED HISTORY: IV Only Contrast Ordering Physician Provided Reason for Exam: patient c/o abd pain for an hour FINDINGS: Lower Chest: Trace pleural fluid bilaterally is noted. Indwelling cardiac pacemaker is present.   Heart size is normal. Coronary artery calcifications are evident. The esophagus is significantly dilated and fluid-filled. No paraesophageal adenopathy is evident. Organs: The spleen, pancreas, and adrenals are unremarkable. The liver contains hypodensities, likely cysts. The gallbladder is distended and contains a solitary gallstone. The kidneys excrete contrast bilaterally. Extrarenal collecting systems are noted. The ureters are mildly dilated down to the urinary bladder without evidence of intraluminal or ureteral obstructing calculi. GI/Bowel: Marked distention of the stomach is noted; this continues to the pylorus with appearance suggesting partial gastric outlet obstruction. Fluid is present in some small bowel loops and colon distal to the stomach. No small bowel obstruction. Pelvis: Marked distention of the urinary bladder is noted. 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 Distended ureters may be related to reflux or infection. No free pelvic fluid, pelvic or inguinal adenopathy is noted. Peritoneum/Retroperitoneum: No aortic aneurysm. Mild to moderate plaque is noted in the infrarenal abdominal aorta and both proximal iliac arteries. Shotty lymph nodes are present around the aorta in the upper abdomen. No mesenteric adenopathy is noted. Bones/Soft Tissues: Degenerative changes are present in the hips and lower lumbar facets. Estimated biologic radiation dose for this procedure:258.77 mGy/cm2.     1. Dilatation of the thoracic esophagus filled with fluid. No obstructive process is noted. No adjacent enlarged lymph nodes. 2. Trace pleural fluid. 3. Marked distention of the stomach. This appears to extend to the pylorus. Partial gastric outlet obstruction is not excluded. 4. Bilateral dilated ureters without obstructing calculi. Urinary bladder is markedly distended with bladder wall air suggesting emphysematous cystitis.  Dilatation of the ureters may be related to reflux or infection. 5. Atherosclerotic disease. 6. Other findings as above. Critical results were called by Dr. Cornel Francisco MD to [address] on 4/11/2019 at 17:37. Xr Chest Portable    Result Date: 4/12/2019  EXAMINATION: SINGLE XRAY VIEW OF THE CHEST 4/12/2019 5:26 am COMPARISON: November 14, 2015. HISTORY: ORDERING SYSTEM PROVIDED HISTORY: line placement TECHNOLOGIST PROVIDED HISTORY: line placement Ordering Physician Provided Reason for Exam: New right side line placement. Acuity: Acute Type of Exam: Initial Additional signs and symptoms: New right side line placement. FINDINGS: Stable left pectoral trans venous cardiac pacer device. New right IJ central venous catheter with tip near the superior atrial caval junction. Normal lung volume. No new consolidation. Curvilinear radiopacity projecting over the right upper lobe likely represents artifact from a skin fold. No pleural effusion or pneumothorax. Stable cardiomediastinal silhouette and great vessels with redemonstration of atherosclerotic thoracic aorta. New right IJ central venous catheter with tip near the superior atrial caval junction. No pneumothorax. No new consolidation. Physical Examination:        Physical Exam   Constitutional: She appears well-developed. She appears cachectic. She appears ill. No distress. Intubated   HENT:   Head: Normocephalic and atraumatic. Eyes: Pupils are equal, round, and reactive to light. Conjunctivae and EOM are normal.   Neck: Normal range of motion. Right IJ central line in place. Site clean and dry. Cardiovascular: Normal rate, regular rhythm, normal heart sounds and intact distal pulses. Exam reveals no gallop and no friction rub. No murmur heard. Pulmonary/Chest: Effort normal. No stridor. No respiratory distress. She has no wheezes. She has rales. Intubated. Abdominal: Soft. Bowel sounds are normal. She exhibits no mass.  There is no tenderness (Decreased tenderness to light palpation. Improvement from prior. ). There is no rebound and no guarding. Musculoskeletal: Normal range of motion. She exhibits edema (Anasarca; 3+ b/l pitting edema in upper and lower ext. ). Left knee wrapped in dressing. Posterior bruising noted. Neurological:   Intubated, sedated   Skin: Skin is warm and dry. She is not diaphoretic. There is pallor. 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 [address] [A41.9, N39.0] 04/11/2019    Cystitis [N30.90] 04/11/2019       Plan:           Septic shock 2/2 E. Coli Emphysematous Cystitis + MRSA PNA   WBC 18.6   Levaquin, Vanc   CXR multifocal airspace disease, left basilar consolidation/effusion   Off of levophed at this time   ECHO 4/12: LVEF 45%, RVSP 36 mmHg   Urology consult, appreciate recs --> cont cath until out of ICU and stable   Critical care consult, appreciate recs   Gen Surg, right IJ central line placed on 4/12   ID consult, appreciate recs   Resp Cx: mod budding yeast --> will defer to ID     Acute hypoxic resp failure 2/2 poss aspiration PNA   Intubated, sedated    ABGs: 7.512/32.8/147/26.3   On ABx, ID following   Pulm/CC following   Weaning steroids    Partial Gastric Outlet Obstruction, R/O Malignancy   Hx of BMs during this admission   CT Abd/pelvis indicative   Persistent clinical abdominal distension   GI consult --> EGD today   Pepcid switched to Protonix, per GI recs   TPN @ 41.7   Multiple tumor markers mildly elevated --> Will await EGD results then consider Heme/onc consult, appreciate  recs    Anemia, likely 2/2 bleeding   Transfusion 1 U RBC on 4/14   Hb 7.1 --> will defer

## 2019-04-16 NOTE — FLOWSHEET NOTE
Patient vented by communicated through nodding her head;     04/16/19 1048   Encounter Summary   Services provided to: Patient   Referral/Consult From: Rounding   Continue Visiting   (4/16/19)   Complexity of Encounter Moderate   Length of Encounter 15 minutes   Spiritual Assessment Completed Yes   Routine   Type Follow up   Spiritual/Temple   Type Spiritual support   Assessment Approachable;Calm   Intervention Prayer;Sustaining presence/ Ministry of presence   Outcome Expressed gratitude;Receptive

## 2019-04-16 NOTE — PROGRESS NOTES
Physical Therapy  DATE: 2019  NAME: Mo Gonsalez  MRN: [id number]   : 1950      Discharge Recommendations   ECF with PT          Subjective: Pt is sedated on on vent this date. Pt opens her eyes during tx with vc's. Pain: JOSHUA  Patient follows: Is patient on ventilator: Yes  Is patient on sedation: Yes  Precautions: Pacemaker, LLE in a long leg cast  Pt's BP upon arrival is 106/56 and during PROM is 147/47, Recheck is 163/52, End of Tx 129/62. RN Linda Otriz informed. Therapeutic exercises: PROM x3 extremeties; LLE in long leg cast. Facial grimaces with LLE ankle pumps. all planes x  20 reps   RLE gastrocnemius stretching 2x30 sec each. Elevated LUE to help decrease edema. Goals  Short Term Goals  Short term goal 1: improve strength RUE/RLE to 4/5 to assist in bed mobility and transfers  Short term goal 2: improve bed mobility to minx1  Short term goal 3: improve transfers to minx1  Short term goal 4: progress to Gait and/or use of wheelchair for mobility       Plan: Progress functional mobility as medically appropriate.    Time In: 1337  Time Out: 1400  Time Coded Minutes (treatment minutes): 23  Rehab Potential: Fair  Treatments/week: 10 visits    Heladio Jose PTA

## 2019-04-16 NOTE — PROGRESS NOTES
Date    WBC 18.6 04/16/2019     H/H:    Lab Results   Component Value Date    HGB 7.1 04/16/2019    HCT 21.7 04/16/2019     PTT:    Lab Results   Component Value Date    APTT 27.7 03/28/2012   [APTT}  PT/INR:    Lab Results   Component Value Date    PROTIME 13.5 04/16/2019    PROTIME 10.9 03/28/2012    INR 1.0 04/16/2019     HgBA1c:    Lab Results   Component Value Date    LABA1C 5.3 04/12/2019       Assessment   Collin Risk Score: Collin Scale Score: 13    Patient Active Problem List   Diagnosis Code    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    CVA (cerebral vascular accident) ([facility] I63.9    Abnormal computed tomography of cervical spine R93.7    Weight loss R63.4    Functional gait abnormality R26.89    Left knee pain M25.562    Knee pain, left M25.562    Acute pain of left knee M25.562    Sepsis due to urinary tract infection (HCC) A41.9, N39.0    Cystitis N30.90    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    Septic shock (HCC) A41.9, R65.21    Leukemoid reaction D72.823    Aspiration pneumonia of right lower lobe due to vomit ([facility] [address] J69.0    MRSA carrier Z22.322    Urinary retention R33.9       Measurements:  Wound 04/11/19 Coccyx Mid (Active)   Wound Pressure Stage  1 4/16/2019  4:15 AM   Dressing/Treatment Moisture barrier;Open to air 4/16/2019  8:00 AM   Wound Assessment Dry; Intact 4/16/2019  8:00 AM   Drainage Amount None 4/16/2019  8:00 AM   Number of days: 4       Wound 04/15/19 Thigh Left;Posterior (Active)   Wound Image   4/15/2019 11:37 AM   Wound Deep tissue/Injury 4/16/2019  4:15 AM   Dressing Change Due 04/18/19 4/15/2019 11:37 AM   Wound Length (cm) 1 cm 4/15/2019 11:37 AM   Wound Width (cm) 14 cm 4/15/2019 11:37 AM   Wound Surface Area (cm^2) 14 cm^2 4/15/2019 11:37 AM   Wound Assessment Purple; Intact 4/15/2019 11:37 AM   Drainage Amount None 4/15/2019 11:37 AM   Number of days: 0            Response to treatment:  Well tolerated by patient.          Plan   Plan of Care: Wound 04/11/19 Elhamyx Mid-Dressing/Treatment: Moisture barrier, Open to air    Specialty Bed Required : Yes   [] Low Air Loss   [x] Pressure Redistribution  [] Fluid Immersion  [] Bariatric  [] Total Pressure Relief  [] Other:     Current Diet: Dietary Nutrition Supplements: Standard High Calorie Oral Supplement  PN-Adult 2-in-1 Central Line (Standard)  Dietician consult:  Yes    Discharge Plan:  Placement for patient upon discharge: skilled nursing    Patient appropriate for Outpatient [address]: No    Referrals:  []   [] [facility]  [] Supplies  [] Other    Patient/Caregiver Teaching:  Level of patient/caregiver understanding able to:   [] Indicates understanding       [] Needs reinforcement  [] Unsuccessful      [] Verbal Understanding  [] Demonstrated understanding       [] No evidence of learning  [] Refused teaching         [] N/A       Electronically signed by AGAPITO Kaufman CNP, [email or web address] on 4/16/2019 at 10:09 AM

## 2019-04-16 NOTE — PLAN OF CARE
Nutrition Problem: Inadequate oral intake  Intervention: Food and/or Nutrient Delivery: Continue NPO, Modify current Parenteral Nutrition  Nutritional Goals: PN to meet greater 90% of estimated nutrition needs

## 2019-04-16 NOTE — PROGRESS NOTES
Patient evaluated for issues with left leg cast. Per staff, cast is rubbing and causing irritation of skin. Examination:  Patient is intubated and sedated on exam.     Cylinder cast in place on left knee. Cast appears to be rubbing at both the proximal and distal ends. Cast is removed today. Upon removal, erythema is seen on medial and lateral thigh from rubbing, but no significant wounds found under cast. 2+ pitting edema to BLE. New cylinder cast is applied to left knee today with increased padding for comfort and protection. Cap refill is <2 seconds after application of cast.     Plan:   Follow up at [facility]. V's as planned.        Jeremias Dutta PA-C  [id number] [facility]

## 2019-04-16 NOTE — PROGRESS NOTES
Infectious disease Consult Note      Patient: Erin Damon  : 1950  Acct#:  [id number]     Date:  2019    Assessment:         Sepsis due to [facility] Emphysematous cystitis     Aspiration pneumonia of right lower lobe. Septic shock     Yeast growth on sputum culture suspect contamination     Leukemoid reaction    Acute hypoxic resp failure     MRSA carrier    Urinary retention ,Tran cath in place     Anemia     Possible gastric outlet obstruction followed by GI . Recommendations:   Continue IV Levaquin and Vancomycin dose was adjusted due to low trough level . Follow sputum and final blood culture  . Follow CBC ,vancomycin level and renal function closely . Continue supportive care . Subjective:       History of Present Illness  Patient is a 77 y.o.  female admitted with Sepsis due to urinary tract infection   who is seen in consult for the same . She presented w dysuria and lower abd pain for around a week associated with vomiting ,was found hypotensive with leukocytosis . CT suggested emphysematous cystitis,possible gastric outlet obstruction. CXR showed a right lower infiltrate. High CA-19-9,CEA  Allergy to [facility] w SOB   Urine culture  grew ESCHERICHIA COLI resistant to Ampicillin ,sensitive to all others tested ABXS . Blood cultures pending . Mycoplasma IGM 0.97   Interval history :  She is still intubated ,awake . No fever . Minimum resp secretion . Right IJ line and Tran cath in place . Levophed was just weaned off this morning . On TPN   WBC 18.6  YEAST MODERATE GROWTH on sputum culture   Vanco trough 4.6 yesterday   No BM   CXR yesterday better .   Past Medical History:   Diagnosis Date    Abnormal computed tomography of cervical spine     sclerotic bone appearance     CVA (cerebral vascular accident) ([address]     left  side weakness    GERD (gastroesophageal reflux disease)     Hypertension     Paraproteinemia     Weight loss       Past Surgical History:   Procedure Laterality Date    INTUBATION  4/14/2019         PACEMAKER PLACEMENT  07/2011    Pacemaker is Medtronic Revo (compatible). Leads placed in 1995 are NOT MRI compatible. Placed at [facility] per Dr. Vazquez Bhagat can not have an MRI. Admission Meds  No current facility-administered medications on file prior to encounter. Current Outpatient Medications on File Prior to Encounter   Medication Sig Dispense Refill    oxyCODONE-acetaminophen (PERCOCET) 5-325 MG per tablet Take 1 tablet by mouth every 6 hours as needed for Pain.  senna-docusate (PERICOLACE) 8.6-50 MG per tablet Take 1 tablet by mouth 2 times daily      budesonide-formoterol (SYMBICORT) 160-4.5 MCG/ACT AERO Inhale 2 puffs into the lungs 2 times daily      sucralfate (CARAFATE) 1 GM/10ML suspension Take 1 g by mouth 4 times daily       traZODone (DESYREL) 50 MG tablet Take 50 mg by mouth nightly      tiZANidine (ZANAFLEX) 2 MG tablet Take 2 mg by mouth every 8 hours as needed (left knee)       aspirin 81 MG tablet Take 81 mg by mouth daily      clopidogrel (PLAVIX) 75 MG tablet TAKE 1 TABLET DAILY 30 tablet 2    DOCQLACE 100 MG capsule TAKE 1 CAPSULE BY MOUTH IN THE MORNING & IN THE EVENING -DRINK PLENTYOF WATER WHILE TAKING THIS MEDICINE 30 capsule 1    amLODIPine (NORVASC) 10 MG tablet Take 10 mg by mouth daily.  LORazepam (ATIVAN) 0.5 MG tablet Take 0.5 mg by mouth every 6 hours as needed for Anxiety.  therapeutic multivitamin-minerals (THERAGRAN-M) tablet Take 1 tablet by mouth daily.  omeprazole (PRILOSEC) 20 MG capsule Take 20 mg by mouth daily.  venlafaxine (EFFEXOR) 37.5 MG tablet Take 37.5 mg by mouth 3 times daily.  Misc. Devices [facility]) [address] wheelchair with left fupper extremity support  Dx: stroke with left hemiparesis.  1 each 0           Allergies  Allergies   Allergen Reactions    Penicillins Shortness Of Breath and Rash    Amoxicillin         Social   Social History     Tobacco Use    Smoking status: Current Some Day Smoker     Packs/day: 1.00     Years: 30.00     Pack years: 30.00    Smokeless tobacco: Never Used   Substance Use Topics    Alcohol use: Not Currently     Alcohol/week: 16.8 oz     Types: 28 Glasses of wine per week                History reviewed. No pertinent family history. Review of Systems  Intubated unable to obtain     Tolerating antibiotics. Physical Exam  BP (!) 92/43   Pulse 96   Temp 99 °F (37.2 °C) (Oral)   Resp 26   Ht 5' (1.524 m)   Wt 126 lb 5.2 oz (57.3 kg)   SpO2 100%   BMI 24.67 kg/m²           General Appearance: intubated ,awake . Skin: warm and dry, no rash or erythema  Head: normocephalic and atraumatic  Eyes: pupils equal, round  Neck: neck supple and non tender   Pulmonary/Chest: coarse to auscultation bilaterally- no wheezes, rales or rhonchi  Cardiovascular: normal rate, regular rhythm, normal S1 and S2, no murmurs. Abdomen: soft, non-tender, non-distended, normal bowel sounds, no masses or organomegaly  Extremities: no cyanosis, clubbing   Edema   Musculoskeletal: left knee cast   Right IJ line   Tran in place    Data Review:    Recent Labs     04/14/19  0439  04/15/19  0422  04/16/19  0000 04/16/19 0407 04/16/19  0744   WBC 27.5*  --  27.5*  --   --  18.6*  --    HGB 7.1*   < > 8.8*   < > 7.3* 7.2* 7.1*   HCT 21.2*   < > 25.8*   < > 22.6* 21.2* 21.7*   MCV 87.1  --  85.7  --   --  85.9  --      --  275  --   --  192  --     < > = values in this interval not displayed.      Recent Labs     04/14/19 0439 04/14/19  1610 04/15/19  0422 04/16/19  0407   *  --  134* 138   K 2.9* 3.2* 3.1* 2.9*     --  103 104   CO2 19*  --  23 25   PHOS  --   --   --  0.7*   BUN 8  --  6* 8   CREATININE <0.40*  --  <0.40* <0.40*     Recent Labs     04/14/19  0439 04/15/19  0422 04/16/19  0407   AST 41* 48* 36*   ALT 17 20 17   BILITOT 0.48 0.74 0.44   ALKPHOS 115* 126* 99     No results for input(s): LIPASE, AMYLASE in the last 72 hours. Recent Labs     04/14/19  0439 04/15/19  0422 04/16/19  0407   PROTIME 22.7* 14.8* 13.5   INR 2.0 1.2 1.0       Imaging Studies:                           All appropriate imaging studies and reports reviewed: Yes       Ct Abdomen Pelvis Wo Contrast Additional Contrast? Oral    Result Date: 4/14/2019  EXAMINATION: CT OF THE ABDOMEN AND PELVIS WITHOUT CONTRAST 4/14/2019 7:34 pm TECHNIQUE: CT of the abdomen and pelvis was performed without the administration of intravenous contrast. Multiplanar reformatted images are provided for review. Dose modulation, iterative reconstruction, and/or weight based adjustment of the mA/kV was utilized to reduce the radiation dose to as low as reasonably achievable. COMPARISON: 04/11/2019 HISTORY: ORDERING SYSTEM PROVIDED HISTORY: ABDOMINAL PAIN TECHNOLOGIST PROVIDED HISTORY: Water soluble contrast only please Ordering Physician Provided Reason for Exam: Abdominal pain - Vented patient. Contrast given via nurse through NG tube. Acuity: Unknown Type of Exam: Unknown Relevant Medical/Surgical History: Hx - Sepsis due to urinary tract infection. FINDINGS: Lower Chest: New moderate layering bilateral pleural effusions with bilateral lower lobe atelectasis. Organs: Limited evaluation due lack of intravenous contrast.  Cholelithiasis redemonstrated. No gallbladder wall thickening or biliary ductal dilatation. Scattered tiny hypodense lesions in the liver are too small to characterize but statistically represent benign cysts or hemangiomas and appear unchanged. The pancreas, spleen, adrenal glands, and kidneys are unremarkable. There is no hydronephrosis or urinary tract calculus. GI/Bowel: The stomach is distended. Enteric tube is in place. No contrast is seen distal to the pylorus and there is contrast reflux into the distal esophagus. There is no evidence of bowel obstruction. The appendix is not definitely visualized.   No focal pericecal inflammatory changes are evident. Pelvis: The urinary bladder is decompressed by Tran catheter. No pelvic mass is seen. Peritoneum/Retroperitoneum: Small amount of free fluid in the pelvic cavity. No free air or focal fluid collection. No abnormal lymph node. Normal abdominal aortic caliber. Moderate calcific atherosclerosis. Bones/Soft Tissues: No acute osseous abnormality. Diffuse anasarca. Moderate degenerative changes in the lumbar spine. 1. Distended, contrast filled stomach with reflux of contrast into the esophagus. No enteric contrast is seen distal to the pylorus suggesting delayed gastric emptying in the setting of ileus. 2. New moderate layering bilateral pleural effusions with bilateral lower lobe atelectasis. 3. Small amount of nonspecific free fluid in the pelvic cavity. 4. Anasarca. 5. Cholelithiasis. Xr Chest (single View Frontal)    Result Date: 4/13/2019  EXAMINATION: SINGLE XRAY VIEW OF THE CHEST 4/13/2019 7:18 am COMPARISON: April 12, 2019 HISTORY: ORDERING SYSTEM PROVIDED HISTORY: dyspnea TECHNOLOGIST PROVIDED HISTORY: dyspnea Ordering Physician Provided Reason for Exam: dyspnea Acuity: Acute Type of Exam: Subsequent/Follow-up Additional signs and symptoms: dyspnea FINDINGS: A right IJ catheter is seen with its tip terminating at the superior cavoatrial junction. The left chest wall pacemaker and leads are stable. The cardiomediastinal silhouette is stable. There is interval increased opacity at the right lung base, may be related to atelectasis versus pneumonia. There is small atelectasis and mild pleural effusion at the left lung base. There is no pneumothorax. There is no acute osseous abnormality. Interval increased opacity at the right lung base, may be related to atelectasis versus pneumonia. Small atelectasis and mild pleural effusion at the left lung, new since the prior study.      Xr Femur Left (min 2 Views)    Result Date: 3/27/2019  EXAMINATION: 4 XRAY VIEWS OF THE LEFT FEMUR; 2 XRAY VIEWS OF THE LEFT KNEE; 3 XRAY VIEWS OF THE LEFT TIBIA AND FIBULA 3/27/2019 7:00 pm COMPARISON: Left hip 11/14/2015. HISTORY: ORDERING SYSTEM PROVIDED HISTORY: pain TECHNOLOGIST PROVIDED HISTORY: pain Ordering Physician Provided Reason for Exam: pt twisted wrong, lt knee pain, unable to straighten knee. Acuity: Acute Type of Exam: Initial FINDINGS: Left femur, four views: The bones are diffusely osteopenic. No acute fracture deformity. The hip joint is maintained. The femoral head projects within the acetabulum. No focal soft tissue abnormality is seen. Left knee, two views: The knee is flexed. No acute osseous abnormality. No joint effusion. Joint spaces are not optimally profiled but no significant arthritic changes are apparent. Left tib-fib, three views: There is evidence of a remote healed distal tibia fracture. No acute fracture or dislocation is seen. No focal soft tissue abnormality. No acute osseous abnormality of the left femur. No acute osseous abnormality of the left knee. No acute osseous abnormality of the left tib-fib. Healed remote distal tibia fracture. Marked osteopenia, likely due to disuse. Xr Knee Left (1-2 Views)    Result Date: 3/27/2019  EXAMINATION: 4 XRAY VIEWS OF THE LEFT FEMUR; 2 XRAY VIEWS OF THE LEFT KNEE; 3 XRAY VIEWS OF THE LEFT TIBIA AND FIBULA 3/27/2019 7:00 pm COMPARISON: Left hip 11/14/2015. HISTORY: ORDERING SYSTEM PROVIDED HISTORY: pain TECHNOLOGIST PROVIDED HISTORY: pain Ordering Physician Provided Reason for Exam: pt twisted wrong, lt knee pain, unable to straighten knee. Acuity: Acute Type of Exam: Initial FINDINGS: Left femur, four views: The bones are diffusely osteopenic. No acute fracture deformity. The hip joint is maintained. The femoral head projects within the acetabulum. No focal soft tissue abnormality is seen. Left knee, two views: The knee is flexed. No acute osseous abnormality. No joint effusion.   Joint spaces are not optimally profiled but no significant arthritic changes are apparent. Left tib-fib, three views: There is evidence of a remote healed distal tibia fracture. No acute fracture or dislocation is seen. No focal soft tissue abnormality. No acute osseous abnormality of the left femur. No acute osseous abnormality of the left knee. No acute osseous abnormality of the left tib-fib. Healed remote distal tibia fracture. Marked osteopenia, likely due to disuse. Xr Knee Left (3 Views)    Result Date: 3/30/2019  EXAMINATION: 3 XRAY VIEWS OF THE LEFT KNEE 3/30/2019 10:58 am COMPARISON: March 27, 2018 HISTORY: ORDERING SYSTEM PROVIDED HISTORY: F/u L knee pain after cast TECHNOLOGIST PROVIDED HISTORY: AP, oblique, lateral F/u L knee pain after cast FINDINGS: Marked osteopenia. Interval casting, which degrades fine osseous detail question cortical offset in the lateral femoral metaphysis. No malalignment identified. No significant joint effusion. Interval casting. Question nondisplaced fracture in the lateral femoral metaphysis. Osteopenia. Xr Tibia Fibula Left (2 Views)    Result Date: 3/27/2019  EXAMINATION: 4 XRAY VIEWS OF THE LEFT FEMUR; 2 XRAY VIEWS OF THE LEFT KNEE; 3 XRAY VIEWS OF THE LEFT TIBIA AND FIBULA 3/27/2019 7:00 pm COMPARISON: Left hip 11/14/2015. HISTORY: ORDERING SYSTEM PROVIDED HISTORY: pain TECHNOLOGIST PROVIDED HISTORY: pain Ordering Physician Provided Reason for Exam: pt twisted wrong, lt knee pain, unable to straighten knee. Acuity: Acute Type of Exam: Initial FINDINGS: Left femur, four views: The bones are diffusely osteopenic. No acute fracture deformity. The hip joint is maintained. The femoral head projects within the acetabulum. No focal soft tissue abnormality is seen. Left knee, two views: The knee is flexed. No acute osseous abnormality. No joint effusion. Joint spaces are not optimally profiled but no significant arthritic changes are apparent.  Left tib-fib, three views: There is evidence of a remote healed distal tibia fracture. No acute fracture or dislocation is seen. No focal soft tissue abnormality. No acute osseous abnormality of the left femur. No acute osseous abnormality of the left knee. No acute osseous abnormality of the left tib-fib. Healed remote distal tibia fracture. Marked osteopenia, likely due to disuse. Xr Abdomen (kub) (single Ap View)    Result Date: 4/14/2019  EXAMINATION: SINGLE SUPINE XRAY VIEW(S) OF THE ABDOMEN 4/14/2019 7:39 am COMPARISON: CT abdomen and pelvis  film from 11 April 2019 HISTORY: [address] [facility] [address]: Abd Distention TECHNOLOGIST PROVIDED HISTORY: Abd Distention Ordering Physician Provided Reason for Exam: Abdominal distention. Pt was moving around in the bed. Best films at present time. Acuity: Acute Type of Exam: Initial Additional signs and symptoms: Abdominal distention. Pt was moving around in the bed. Best films at present time. FINDINGS: Portable view time stamped at 748 hours demonstrates an intestinal tube terminating in the midportion of a gaseous Spike dilated stomach. Densities are present over the stomach likely medication. Bipolar pacemaker is in situ with intact leads. Heart size is top-normal, stable. Gaseous distension of the stomach and loop of bowel in the upper mid abdomen is noted but there is gas and fecal material in the rectum. Gastric outlet obstruction or proximal small bowel partial obstruction is suspected. No free air is noted. Midline city is present over the pelvis likely a monitor or CT small bore catheter. Vascular calcification is present in the pelvis. Persistent preferential gaseous distension of the stomach although there is some gas in the upper abdomen and gas and fecal material present in the rectosigmoid. Findings suggest gastric outlet obstruction.      Ct Abdomen Pelvis W Iv Contrast    Result Date: 4/11/2019  EXAMINATION: CT OF THE ABDOMEN AND PELVIS WITH CONTRAST 4/11/2019 5:14 pm TECHNIQUE: CT of the abdomen and pelvis was performed with the administration of intravenous contrast. Multiplanar reformatted images are provided for review. Dose modulation, iterative reconstruction, and/or weight based adjustment of the mA/kV was utilized to reduce the radiation dose to as low as reasonably achievable. COMPARISON: None. HISTORY: ORDERING SYSTEM PROVIDED HISTORY: Abdominal pain TECHNOLOGIST PROVIDED HISTORY: IV Only Contrast Ordering Physician Provided Reason for Exam: patient c/o abd pain for an hour FINDINGS: Lower Chest: Trace pleural fluid bilaterally is noted. Indwelling cardiac pacemaker is present. Heart size is normal.  Coronary artery calcifications are evident. The esophagus is significantly dilated and fluid-filled. No paraesophageal adenopathy is evident. Organs: The spleen, pancreas, and adrenals are unremarkable. The liver contains hypodensities, likely cysts. The gallbladder is distended and contains a solitary gallstone. The kidneys excrete contrast bilaterally. Extrarenal collecting systems are noted. The ureters are mildly dilated down to the urinary bladder without evidence of intraluminal or ureteral obstructing calculi. GI/Bowel: Marked distention of the stomach is noted; this continues to the pylorus with appearance suggesting partial gastric outlet obstruction. Fluid is present in some small bowel loops and colon distal to the stomach. No small bowel obstruction. Pelvis: Marked distention of the urinary bladder is noted. 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 Distended ureters may be related to reflux or infection. No free pelvic fluid, pelvic or inguinal adenopathy is noted. Peritoneum/Retroperitoneum: No aortic aneurysm. Mild to moderate plaque is noted in the infrarenal abdominal aorta and both proximal iliac arteries. Shotty lymph nodes are present around the aorta in the upper abdomen. No mesenteric adenopathy is noted. Bones/Soft Tissues: Degenerative changes are present in the hips and lower lumbar facets. Estimated biologic radiation dose for this procedure:258.77 mGy/cm2.     1. Dilatation of the thoracic esophagus filled with fluid. No obstructive process is noted. No adjacent enlarged lymph nodes. 2. Trace pleural fluid. 3. Marked distention of the stomach. This appears to extend to the pylorus. Partial gastric outlet obstruction is not excluded. 4. Bilateral dilated ureters without obstructing calculi. Urinary bladder is markedly distended with bladder wall air suggesting emphysematous cystitis. Dilatation of the ureters may be related to reflux or infection. 5. Atherosclerotic disease. 6. Other findings as above. Critical results were called by Dr. Mariam Peña MD to [address] on 4/11/2019 at 17:37. Xr Chest Portable    Result Date: 4/16/2019  EXAMINATION: SINGLE XRAY VIEW OF THE CHEST 4/16/2019 6:54 am COMPARISON: 04/15/2019, 610 hours HISTORY: ORDERING SYSTEM PROVIDED HISTORY: ETT placement TECHNOLOGIST PROVIDED HISTORY: ETT placement Ordering Physician Provided Reason for Exam: on vent Acuity: Acute Type of Exam: Initial 70-year-old female on ventilator; check endotracheal tube placement FINDINGS: Portable AP upright view of the chest. Endotracheal tube distal tip overlying the mid trachea approximately 4.1 cm above the level of the ron. Enteric tube traverses the GE junction with distal tip excluded from the field of view. Left subclavian approach cardiac pacemaker device distal lead tips relatively stable in position. Right internal jugular approach central venous catheter distal tip overlying the high right atrium, stable. Cardiac monitor leads overlie the chest. Atherosclerotic calcification of the thoracic aorta. Slight stable volume loss of the left hemithorax. No pneumothorax. No free air.   Dense retrocardiac/left basilar airspace consolidation and small left-sided pleural effusion. Stable mild focal opacity at the right mid lung zone. Underlying COPD. Stable mild pulmonary vascular congestion and left-sided predominant parahilar opacity. Visualized osseous structures remain unchanged. 1. Stable multifocal airspace disease as detailed above with dense retrocardiac/left basilar airspace consolidation and small left-sided pleural effusion. Mild pulmonary vascular congestion. Findings may represent edema or multifocal pneumonia. 2. Underlying COPD. 3. Tubes and line as detailed above. Xr Chest Portable    Result Date: 4/15/2019  EXAMINATION: SINGLE XRAY VIEW OF THE CHEST 4/15/2019 6:47 am COMPARISON: 14 April 2019 HISTORY: ORDERING SYSTEM PROVIDED HISTORY: ETT placement TECHNOLOGIST PROVIDED HISTORY: ETT placement Ordering Physician Provided Reason for Exam: on vent Acuity: Acute Type of Exam: Initial FINDINGS: AP portable view of the chest time stamped at 612 hours demonstrates overlying cardiac monitoring electrodes. Endotracheal tube terminates 4 cm above the ron. Bipolar pacemaker enters from the left with intact leads in appropriate positions. Intestinal tube extends beyond the fundus of the stomach, tip not included. Right internal jugular catheter terminates at the cavoatrial junction. Heart size is normal.  Aortic arch is calcified. There is interval improvement in vascular congestion with resolution of perihilar opacities. Some bibasilar opacities remain. No extrapleural air is noted. Osseous structures are stable. Interval improvement in vascular congestion and bilateral opacities consistent with resolving pulmonary edema. Tubes and lines as above. Xr Chest Portable    Result Date: 4/14/2019  EXAMINATION: SINGLE XRAY VIEW OF THE CHEST 4/14/2019 7:57 am COMPARISON: Portable chest 04/13/2019.  HISTORY: ORDERING SYSTEM PROVIDED HISTORY: Intubation TECHNOLOGIST PROVIDED HISTORY: Intubation Ordering Physician Provided Reason for Exam: intubation Acuity: Acute Type of Exam: Initial Additional signs and symptoms: intubation FINDINGS: Endotracheal tube terminates over the midthoracic trachea. Dual-chamber pacemaker leads appear unchanged in position. Right IJ approach central venous catheter unchanged in position. Heart size not substantially changed. Perihilar and basilar opacities further increased. Left pleural effusion increased in size. Findings may reflect pulmonary edema, progressed from yesterday's exam.  Left pleural effusion increased in size. Xr Chest Portable    Result Date: 4/12/2019  EXAMINATION: SINGLE XRAY VIEW OF THE CHEST 4/12/2019 5:26 am COMPARISON: November 14, 2015. HISTORY: ORDERING SYSTEM PROVIDED HISTORY: line placement TECHNOLOGIST PROVIDED HISTORY: line placement Ordering Physician Provided Reason for Exam: New right side line placement. Acuity: Acute Type of Exam: Initial Additional signs and symptoms: New right side line placement. FINDINGS: Stable left pectoral trans venous cardiac pacer device. New right IJ central venous catheter with tip near the superior atrial caval junction. Normal lung volume. No new consolidation. Curvilinear radiopacity projecting over the right upper lobe likely represents artifact from a skin fold. No pleural effusion or pneumothorax. Stable cardiomediastinal silhouette and great vessels with redemonstration of atherosclerotic thoracic aorta. New right IJ central venous catheter with tip near the superior atrial caval junction. No pneumothorax. No new consolidation. Thank you for allowing me to participate in the care of your patient. Please feel free to contact me with any questions or concerns.      Freda Burnett MD

## 2019-04-16 NOTE — PROGRESS NOTES
HME  Additional Respiratory  Assessments  Pulse: 95  Resp: 25  SpO2: 100 %  End Tidal CO2: 29 (%)  Position: Semi-Diaz's  Humidification Source: HME  Oral Care Completed?: Yes  Oral Care: Mouth swabbed, Mouth moisturizer, Lip moisturizer applied  Cuff Pressure (cm H2O): 25 cm H2O       ABGs:   Lab Results   Component Value Date    PHART 7.512 04/16/2019    PO2ART 147.0 04/16/2019    JDG6EIC 32.8 04/16/2019       Lab Results   Component Value Date    MODE PRVC 04/16/2019         Medications   IV   PN-Adult 2-in-1 Central Line (Standard) 41.7 mL/hr at 04/15/19 1750    fat emulsion Stopped (04/16/19 0550)    propofol 30 mcg/kg/min (04/16/19 0917)    vasopressin (Septic Shock) infusion Stopped (04/14/19 1930)    norepinephrine 2 mcg/min (04/16/19 0922)    lactated ringers 125 mL/hr at 04/16/19 0503    dextrose        pantoprazole  40 mg Intravenous Daily    And    sodium chloride (PF)  10 mL Intravenous Daily    sodium chloride  250 mL Intravenous Once    methylPREDNISolone  40 mg Intravenous Q6H    vancomycin  1,000 mg Intravenous Q12H    polyvinyl alcohol  1 drop Both Eyes Q4H    And    lubrifresh P.M. Both Eyes Q4H    chlorhexidine  15 mL Mouth/Throat BID    ipratropium  0.5 mg Nebulization Q4H    insulin lispro  0-12 Units Subcutaneous Q6H    levalbuterol  1.25 mg Nebulization Q4H    docusate sodium  100 mg Oral TID    vancomycin (VANCOCIN) intermittent dosing (placeholder)   Other RX Placeholder    levofloxacin  500 mg Intravenous Q24H    venlafaxine  37.5 mg Oral TID    clopidogrel  75 mg Oral Daily    aspirin  81 mg Oral Daily    sodium chloride flush  10 mL Intravenous 2 times per day    enoxaparin  40 mg Subcutaneous Daily       Diet/Nutrition   Dietary Nutrition Supplements: Standard High Calorie Oral Supplement  PN-Adult 2-in-1 Central Line (Standard)    Exam   VITALS    height is 5' (1.524 m) and weight is 126 lb 5.2 oz (57.3 kg). Her oral temperature is 99 °F (37.2 °C).  Her blood pressure is 103/60 and her pulse is 95. Her respiration is 25 and oxygen saturation is 100%. Ventilator Settings (Basic)  Vent Mode: PRVC Rate Set: 16 bmp/Vt Ordered: 400 mL/ /FiO2 : 40 %    Constitutional - Sedated  General Appearance  well developed, well nourished  HEENT - Life support devices in place (ET, OG),normocephalic, atraumatic. Lungs - Chest expands equally, no wheezes, rales or rhonchi. Cardiovascular - Heart sounds are normal.  normal rate and rhythm regular, no murmur, gallop or rub. Abdomen - soft, nontender, nondistended  Extremities - no cyanosis, clubbing or edema    Lab Results   CBC     Lab Results   Component Value Date    WBC 18.6 04/16/2019    RBC 2.47 04/16/2019    RBC 3.66 05/23/2012    HGB 7.1 04/16/2019    HCT 21.7 04/16/2019     04/16/2019     05/23/2012    MCV 85.9 04/16/2019    MCH 29.1 04/16/2019    MCHC 33.9 04/16/2019    RDW 14.0 04/16/2019    METASPCT 2 04/11/2019    LYMPHOPCT 4 04/11/2019    MONOPCT 2 04/11/2019    BASOPCT 0 04/11/2019    MONOSABS 0.96 04/11/2019    LYMPHSABS 1.92 04/11/2019    EOSABS 0.00 04/11/2019    BASOSABS 0.00 04/11/2019    DIFFTYPE NOT REPORTED 04/11/2019       BMP   Lab Results   Component Value Date     04/16/2019    K 2.9 04/16/2019     04/16/2019    CO2 25 04/16/2019    BUN 8 04/16/2019    CREATININE <0.40 04/16/2019    GLUCOSE 168 04/16/2019    GLUCOSE 87 05/21/2012    CALCIUM 7.1 04/16/2019       LFTS  Lab Results   Component Value Date    ALKPHOS 99 04/16/2019    ALT 17 04/16/2019    AST 36 04/16/2019    PROT 3.8 04/16/2019    BILITOT 0.44 04/16/2019    BILIDIR 0.21 05/17/2012    IBILI 0.36 05/17/2012    LABALBU 1.7 04/16/2019    LABALBU 4.6 05/17/2012       INR  Recent Labs     04/14/19  0439 04/15/19  0422 04/16/19  0407   PROTIME 22.7* 14.8* 13.5   INR 2.0 1.2 1.0       APTT  No results for input(s): APTT in the last 72 hours.     Lactic Acid  Lab Results   Component Value Date    LACTA 2.1 04/14/2019 LACTA 1.5 04/12/2019    LACTA 1.9 04/12/2019        BNP   No results for input(s): BNP in the last 72 hours. Cultures       Radiology     Plain Films         CT Scans    See actual reports for details    SYSTEM ASSESSMENT  Acute hypoxic respiratory failure  UTI E. coli ESBL  Shock  Includes septic shock versus acute blood loss anemia. Neuro       Respiratory   Wean oxygen as tolerated.  Keep O2 sat 90-92%    Hemodynamics       Gastrointestinal/Nutrition       Renal       Infectious Disease       Hematology/Oncology       Endocrine       Social/Spiritual/DNR/Disposition/Other   CVP monitoring  On Lovenox for DVT prophylaxis  On Levaquin and vancomycin  On Xopenex Atrovent  Can wean Solu-Medrol to 40 every 8 hours  EGD ordered  Blood transfusion ordered per GI    Critical Care Time   35  min    Electronically signed by Ame Dwyer MD on 4/16/2019 at 11:03 AM

## 2019-04-16 NOTE — PROGRESS NOTES
Bedside EGD completed by Dr. Dell Torres. Order received to flush NGT with 200 ml water every 2 hours.

## 2019-04-16 NOTE — PROGRESS NOTES
Spoke with Dr. Arash Grijalva re: drop in HGB from 8.8 to 7.1. Order received to give 1 unit PRBC's.

## 2019-04-16 NOTE — FLOWSHEET NOTE
04/16/19 1623   Provider Notification   Reason for Communication Critical Value (comment)  (ABg's)   Provider Name Ron Uriostegui [facility]    Provider Notification Physician   Method of Communication Secure Message   Response No new orders   Notification Time [phone]   Dr. Ron Uriostegui [facility]  notified of ABG's and abnormal lab results from [facility]. New orders received.

## 2019-04-16 NOTE — PROGRESS NOTES
General Surgery Progress Note            PATIENT NAME: Erin Damon     TODAY'S DATE: 4/16/2019, 5:14 PM    SUBJECTIVE:    Pt  Seen and examined. OBJECTIVE:   VITALS:  BP (!) 122/57   Pulse 90   Temp 98.7 °F (37.1 °C) (Oral)   Resp 16   Ht 5' (1.524 m)   Wt 126 lb 5.2 oz (57.3 kg)   SpO2 97%   BMI 24.67 kg/m²      INTAKE/OUTPUT:      Intake/Output Summary (Last 24 hours) at 4/16/2019 1714  Last data filed at 4/16/2019 1600  Gross per 24 hour   Intake 5541.06 ml   Output 5425 ml   Net 116.06 ml                 CONSTITUTIONAL:  awake and intubated.  No acute distress  HEART:   Regular   LUNGS:   Diminished   ABDOMEN:   Abdomen soft, diffusely tender without rebound or guarding, non-distended  EXTREMITIES:   No edema    Data:  CBC with Differential:    Lab Results   Component Value Date    WBC 18.6 04/16/2019    RBC 2.47 04/16/2019    RBC 3.66 05/23/2012    HGB 7.1 04/16/2019    HCT 21.7 04/16/2019     04/16/2019     05/23/2012    MCV 85.9 04/16/2019    MCH 29.1 04/16/2019    MCHC 33.9 04/16/2019    RDW 14.0 04/16/2019    METASPCT 2 04/11/2019    LYMPHOPCT 4 04/11/2019    MONOPCT 2 04/11/2019    BASOPCT 0 04/11/2019    MONOSABS 0.96 04/11/2019    LYMPHSABS 1.92 04/11/2019    EOSABS 0.00 04/11/2019    BASOSABS 0.00 04/11/2019    DIFFTYPE NOT REPORTED 04/11/2019     CMP:    Lab Results   Component Value Date     04/16/2019    K 2.9 04/16/2019     04/16/2019    CO2 25 04/16/2019    BUN 8 04/16/2019    CREATININE <0.40 04/16/2019    GFRAA CANNOT BE CALCULATED 04/16/2019    LABGLOM CANNOT BE CALCULATED 04/16/2019    GLUCOSE 168 04/16/2019    GLUCOSE 87 05/21/2012    PROT 3.8 04/16/2019    LABALBU 1.7 04/16/2019    LABALBU 4.6 05/17/2012    CALCIUM 7.1 04/16/2019    BILITOT 0.44 04/16/2019    ALKPHOS 99 04/16/2019    AST 36 04/16/2019    ALT 17 04/16/2019         ASSESSMENT     Principal Problem:    Sepsis due to urinary tract infection (HCC)  Active Problems:    Cystitis Emphysematous cystitis    Septic shock (HCC)    Leukemoid reaction    Aspiration pneumonia of right lower lobe due to vomit [facility])    MRSA carrier    Urinary retention  Resolved Problems:    * No resolved hospital problems. *      Plan  1. EGD note reviewed. No obvious gastric outlet obstruction  2. May have gastroparesis  3. Will need SBFT once stomach is cleared  4.  Continue TPN, antibiotics

## 2019-04-16 NOTE — FLOWSHEET NOTE
04/16/19 1317   Encounter Summary   Services provided to: Patient   Referral/Consult From: Ольга   Continue Visiting   (4/16/19V)   Volunteer Visit Yes   Complexity of Encounter Low   Length of Encounter 15 minutes   Routine   Type Follow up   Spiritual/Pentecostalism   Type Spiritual support   Intervention Prayer

## 2019-04-16 NOTE — PROGRESS NOTES
Levophed off since 0755. Patient's blood pressure dropping into 70's with MAP in 40's. Levophed restarted.

## 2019-04-16 NOTE — OP NOTE
ESOPHAGOGASTRODUODENOSCOPY   ( EGD )  DATE OF PROCEDURE: 4/16/2019     SURGEON: Vijaya Fortune MD    ASSISTANT: None    PREOPERATIVE DIAGNOSIS: Patient has abnormal CT scan suggestive of gastric outlet obstruction. Patient has signs of aspiration pneumonia. This procedure is performed in ICU bedside evaluate gastric pathology. POSTOPERATIVE DIAGNOSIS: Patient has esophagitis. Has a large amount of retained solid food in the upper part of the stomach and fundus. Antrum is clear. Pylorus wide open and did not show signs of stenosis. OPERATION: Upper GI endoscopy       ANESTHESIA: Moderate Sedation    ESTIMATED BLOOD LOSS: None    COMPLICATIONS: None. SPECIMENS:  Was Not Obtained    HISTORY: The patient is a 77y.o. year old female with history of above preop diagnosis. I recommended esophagogastroduodenoscopy with possible biopsy and I explained the risk, benefits, expected outcome, and alternatives to the procedure to patient's family over the phone. .  Risks included but are not limited to bleeding, infection, respiratory distress, hypotension, and perforation of the esophagus, stomach, or duodenum. Patient family understood and consent was given      PROCEDURE: The patient was given IV conscious sedation. The patient's SPO2 remained above 90% throughout the procedure. Cetacaine spray given. Patient placed in left lateral position. Olympus  videogastroscope was inserted orally under vision into the esophagus without difficulty and advanced into the stomach then through the pylorus up to the second part of duodenum. Findings:    Retropharyngeal area was grossly normal appearing    Esophagus: abnormal: Has a grade 2 esophagitis. Appears peptic esophagitis. Has a small sliding hiatal hernia. Stomach:  Fundus of the stomach and body of the stomach. With solid food. 75% of the lumen is occupied with solid food. Antrum: No retained food.   Able to advance the scope through the pylorus without difficulty. No pyloric stenosis seen. No signs of outlet obstruction. Duodenum:     Descending: normal    Bulb: normal  Minimal liquid present in the 2nd part of the duodenum. While withdrawing the scope the above findings were verified and the scope was removed. The patient has tolerated the procedure and conscious sedation without unusual events. Recommendations/Plan:   1. Patient needs NG decompression and irrigation and aspiration frequently.   2.                    Electronically signed by Jason Meng MD  on 4/16/2019 at 2:51 PM

## 2019-04-16 NOTE — PROGRESS NOTES
[id number]   OCCUPATIONAL THERAPY MISSED TREATMENT NOTE   INPATIENT   Date: 19  Patient Name: Mariela Worley       Room:   MRN: [id number]   Account #: [de-identified]    : 1950  (77 y.o.)  Gender: female                 REASON FOR MISSED TREATMENT:  Patient unable to participate   -   Pt is sedated and on vent support. Will defer OT evaluation until Pt is appropriate to participate in self-care and functional tasks.      Ron Fenton, OT

## 2019-04-16 NOTE — PROGRESS NOTES
Nutrition Assessment (Parenteral Nutrition)    Type and Reason for Visit: Reassess    Nutrition Recommendations: TPN increased to 65 ml/hr with lipids discontinued. Following changes made in additives: Na acetate- 50 to 60 mEq, NaCl- 30 to 40 mEq, K acetate- 20 to 35 mEq, K Phos- 15 to 40 mmol, KCl- 15 to 30 mEq, Ca+- 6 to 8 mEq, Mg- 5 to 10 mEq, insulin-10 units, no MVI or trace elements. Nutrition Assessment: Pt improving from a nutritional standpoint as on TPN to nutritionally support, EGD noted 75% of lumen had solid food, GI recommend NG decompression & irrigation. Pt remains intubated with Propofol at 10.3 ml, lipids discontinued since on Propofol and triglyceride level was 290. Malnutrition Assessment:  · Malnutrition Status: At risk for malnutrition  · Context: Acute illness or injury  · Findings of the 6 clinical characteristics of malnutrition (Minimum of 2 out of 6 clinical characteristics is required to make the diagnosis of moderate or severe Protein Calorie Malnutrition based on AND/ASPEN Guidelines):  1. Weight Loss-(edema present),    2. Fat Loss-Unable to assess(unable to do physical exam-RN didn't want her disturbed),    3. Muscle Loss-Unable to assess,    4. Fluid Accumulation-Moderate to severe fluid accumulation,    5.  Strength-Not measured    Nutrition Risk Level: High    Nutrient Needs:  · Estimated Daily Total Kcal: 3024-1255 based on 25-27 kcal/kg current wt(revised)  · Estimated Daily Protein (g): 63-68 based on 1.4-1.5 gm/kg IBW(revised)    Nutrition Diagnosis:   · Problem: Inadequate oral intake  · Etiology: related to Alteration in GI function, Insufficient energy/nutrient consumption     Signs and symptoms:  as evidenced by NPO status due to medical condition, Nutrition support - PN    Objective Information:  · Nutrition-Focused Physical Findings: GI: non-distended, hypoactive bowel sounds.  Edema: +1 pitting all extremities  · Wound Type: Pressure Ulcer, Stage

## 2019-04-16 NOTE — PROGRESS NOTES
Patient had cast applied by [facility] last week  Cast digging in to skin in several areas  Will have my assistant come up to ICU and adjust to comfort  Follow up at [facility].t    Thank you

## 2019-04-17 ENCOUNTER — APPOINTMENT (OUTPATIENT)
Dept: GENERAL RADIOLOGY | Age: 69
DRG: 853 | End: 2019-04-17
Payer: COMMERCIAL

## 2019-04-17 LAB
ALBUMIN SERPL-MCNC: 1.7 G/DL (ref 3.5–5.2)
ALBUMIN/GLOBULIN RATIO: ABNORMAL (ref 1–2.5)
ALLEN TEST: ABNORMAL
ALP BLD-CCNC: 96 U/L (ref 35–104)
ALT SERPL-CCNC: 17 U/L (ref 5–33)
ANION GAP SERPL CALCULATED.3IONS-SCNC: 9 MMOL/L (ref 9–17)
AST SERPL-CCNC: 33 U/L
BILIRUB SERPL-MCNC: 0.53 MG/DL (ref 0.3–1.2)
BUN BLDV-MCNC: 8 MG/DL (ref 8–23)
BUN/CREAT BLD: ABNORMAL (ref 9–20)
CALCIUM SERPL-MCNC: 6.8 MG/DL (ref 8.6–10.4)
CARBOXYHEMOGLOBIN: 0.7 % (ref 0–5)
CHLORIDE BLD-SCNC: 100 MMOL/L (ref 98–107)
CO2: 29 MMOL/L (ref 20–31)
CREAT SERPL-MCNC: <0.4 MG/DL (ref 0.5–0.9)
CULTURE: ABNORMAL
CULTURE: NORMAL
CULTURE: NORMAL
DIRECT EXAM: ABNORMAL
FIO2: 30
GFR AFRICAN AMERICAN: ABNORMAL ML/MIN
GFR NON-AFRICAN AMERICAN: ABNORMAL ML/MIN
GFR SERPL CREATININE-BSD FRML MDRD: ABNORMAL ML/MIN/{1.73_M2}
GFR SERPL CREATININE-BSD FRML MDRD: ABNORMAL ML/MIN/{1.73_M2}
GLUCOSE BLD-MCNC: 147 MG/DL (ref 65–105)
GLUCOSE BLD-MCNC: 154 MG/DL (ref 65–105)
GLUCOSE BLD-MCNC: 164 MG/DL (ref 70–99)
GLUCOSE BLD-MCNC: 186 MG/DL (ref 65–105)
GLUCOSE BLD-MCNC: 222 MG/DL (ref 65–105)
HCO3 ARTERIAL: 32.5 MMOL/L (ref 22–26)
HCT VFR BLD CALC: 32 % (ref 36–46)
HCT VFR BLD CALC: 32.5 % (ref 36–46)
HCT VFR BLD CALC: 32.7 % (ref 36–46)
HCT VFR BLD CALC: 33.3 % (ref 36–46)
HEMOGLOBIN: 10.8 G/DL (ref 12–16)
HEMOGLOBIN: 10.8 G/DL (ref 12–16)
HEMOGLOBIN: 11.1 G/DL (ref 12–16)
HEMOGLOBIN: 11.2 G/DL (ref 12–16)
INR BLD: 1
Lab: ABNORMAL
Lab: NORMAL
Lab: NORMAL
MAGNESIUM: 1.6 MG/DL (ref 1.6–2.6)
MAGNESIUM: 2 MG/DL (ref 1.6–2.6)
MCH RBC QN AUTO: 30.7 PG (ref 26–34)
MCHC RBC AUTO-ENTMCNC: 34.5 G/DL (ref 31–37)
MCV RBC AUTO: 88.8 FL (ref 80–100)
METHEMOGLOBIN: 0.9 % (ref 0–1.9)
MODE: ABNORMAL
NEGATIVE BASE EXCESS, ART: ABNORMAL MMOL/L (ref 0–2)
NOTIFICATION TIME: ABNORMAL
NOTIFICATION: ABNORMAL
NRBC AUTOMATED: ABNORMAL PER 100 WBC
O2 DEVICE/FLOW/%: ABNORMAL
O2 SAT, ARTERIAL: 94.3 % (ref 95–98)
OXYHEMOGLOBIN: ABNORMAL % (ref 95–98)
PATIENT TEMP: 37
PCO2 ARTERIAL: 38.7 MMHG (ref 35–45)
PCO2, ART, TEMP ADJ: ABNORMAL (ref 35–45)
PDW BLD-RTO: 14.2 % (ref 11.5–14.9)
PEEP/CPAP: 5
PH ARTERIAL: 7.53 (ref 7.35–7.45)
PH, ART, TEMP ADJ: ABNORMAL (ref 7.35–7.45)
PHOSPHORUS: 1.4 MG/DL (ref 2.6–4.5)
PHOSPHORUS: 2 MG/DL (ref 2.6–4.5)
PLATELET # BLD: 157 K/UL (ref 150–450)
PMV BLD AUTO: 8.7 FL (ref 6–12)
PO2 ARTERIAL: 66.2 MMHG (ref 80–100)
PO2, ART, TEMP ADJ: ABNORMAL MMHG (ref 80–100)
POSITIVE BASE EXCESS, ART: 9.9 MMOL/L (ref 0–2)
POTASSIUM SERPL-SCNC: 3 MMOL/L (ref 3.7–5.3)
POTASSIUM SERPL-SCNC: 4.1 MMOL/L (ref 3.7–5.3)
PROTHROMBIN TIME: 13.6 SEC (ref 11.8–14.6)
PSV: ABNORMAL
PT. POSITION: ABNORMAL
RBC # BLD: 3.66 M/UL (ref 4–5.2)
RESPIRATORY RATE: 16
SAMPLE SITE: ABNORMAL
SET RATE: 16
SODIUM BLD-SCNC: 138 MMOL/L (ref 135–144)
SPECIMEN DESCRIPTION: ABNORMAL
SPECIMEN DESCRIPTION: NORMAL
SPECIMEN DESCRIPTION: NORMAL
TEXT FOR RESPIRATORY: ABNORMAL
TOTAL HB: ABNORMAL G/DL (ref 12–16)
TOTAL PROTEIN: 4 G/DL (ref 6.4–8.3)
TOTAL RATE: 16
TROPONIN INTERP: ABNORMAL
TROPONIN INTERP: ABNORMAL
TROPONIN T: ABNORMAL NG/ML
TROPONIN T: ABNORMAL NG/ML
TROPONIN, HIGH SENSITIVITY: 16 NG/L (ref 0–14)
TROPONIN, HIGH SENSITIVITY: 17 NG/L (ref 0–14)
VANCOMYCIN TROUGH DATE LAST DOSE: NORMAL
VANCOMYCIN TROUGH DOSE AMOUNT: NORMAL
VANCOMYCIN TROUGH TIME LAST DOSE: 1427
VANCOMYCIN TROUGH: 15.5 UG/ML (ref 10–20)
VT: 400
WBC # BLD: 17 K/UL (ref 3.5–11)

## 2019-04-17 PROCEDURE — 2580000003 HC RX 258: Performed by: INTERNAL MEDICINE

## 2019-04-17 PROCEDURE — 84100 ASSAY OF PHOSPHORUS: CPT

## 2019-04-17 PROCEDURE — 85027 COMPLETE CBC AUTOMATED: CPT

## 2019-04-17 PROCEDURE — 94761 N-INVAS EAR/PLS OXIMETRY MLT: CPT

## 2019-04-17 PROCEDURE — 6360000002 HC RX W HCPCS: Performed by: INTERNAL MEDICINE

## 2019-04-17 PROCEDURE — 2500000003 HC RX 250 WO HCPCS: Performed by: INTERNAL MEDICINE

## 2019-04-17 PROCEDURE — 84132 ASSAY OF SERUM POTASSIUM: CPT

## 2019-04-17 PROCEDURE — 85610 PROTHROMBIN TIME: CPT

## 2019-04-17 PROCEDURE — 84484 ASSAY OF TROPONIN QUANT: CPT

## 2019-04-17 PROCEDURE — 85018 HEMOGLOBIN: CPT

## 2019-04-17 PROCEDURE — 94003 VENT MGMT INPAT SUBQ DAY: CPT

## 2019-04-17 PROCEDURE — 94770 HC ETCO2 MONITOR DAILY: CPT

## 2019-04-17 PROCEDURE — 80202 ASSAY OF VANCOMYCIN: CPT

## 2019-04-17 PROCEDURE — 2500000003 HC RX 250 WO HCPCS: Performed by: STUDENT IN AN ORGANIZED HEALTH CARE EDUCATION/TRAINING PROGRAM

## 2019-04-17 PROCEDURE — 94640 AIRWAY INHALATION TREATMENT: CPT

## 2019-04-17 PROCEDURE — 82947 ASSAY GLUCOSE BLOOD QUANT: CPT

## 2019-04-17 PROCEDURE — 6360000002 HC RX W HCPCS: Performed by: STUDENT IN AN ORGANIZED HEALTH CARE EDUCATION/TRAINING PROGRAM

## 2019-04-17 PROCEDURE — 82805 BLOOD GASES W/O2 SATURATION: CPT

## 2019-04-17 PROCEDURE — 2700000000 HC OXYGEN THERAPY PER DAY

## 2019-04-17 PROCEDURE — 36600 WITHDRAWAL OF ARTERIAL BLOOD: CPT

## 2019-04-17 PROCEDURE — 6360000002 HC RX W HCPCS: Performed by: NURSE PRACTITIONER

## 2019-04-17 PROCEDURE — 2580000003 HC RX 258: Performed by: NURSE PRACTITIONER

## 2019-04-17 PROCEDURE — C9113 INJ PANTOPRAZOLE SODIUM, VIA: HCPCS | Performed by: INTERNAL MEDICINE

## 2019-04-17 PROCEDURE — 99233 SBSQ HOSP IP/OBS HIGH 50: CPT | Performed by: INTERNAL MEDICINE

## 2019-04-17 PROCEDURE — 85014 HEMATOCRIT: CPT

## 2019-04-17 PROCEDURE — 2000000000 HC ICU R&B

## 2019-04-17 PROCEDURE — 80053 COMPREHEN METABOLIC PANEL: CPT

## 2019-04-17 PROCEDURE — 94762 N-INVAS EAR/PLS OXIMTRY CONT: CPT

## 2019-04-17 PROCEDURE — 71045 X-RAY EXAM CHEST 1 VIEW: CPT

## 2019-04-17 PROCEDURE — 36415 COLL VENOUS BLD VENIPUNCTURE: CPT

## 2019-04-17 PROCEDURE — 6370000000 HC RX 637 (ALT 250 FOR IP): Performed by: INTERNAL MEDICINE

## 2019-04-17 PROCEDURE — 83735 ASSAY OF MAGNESIUM: CPT

## 2019-04-17 RX ORDER — METOCLOPRAMIDE HYDROCHLORIDE 5 MG/ML
10 INJECTION INTRAMUSCULAR; INTRAVENOUS EVERY 6 HOURS
Status: DISCONTINUED | OUTPATIENT
Start: 2019-04-17 | End: 2019-04-17 | Stop reason: SDUPTHER

## 2019-04-17 RX ORDER — METOCLOPRAMIDE HYDROCHLORIDE 5 MG/ML
10 INJECTION INTRAMUSCULAR; INTRAVENOUS 3 TIMES DAILY
Status: DISCONTINUED | OUTPATIENT
Start: 2019-04-18 | End: 2019-04-19

## 2019-04-17 RX ORDER — METOCLOPRAMIDE 10 MG/1
10 TABLET ORAL
Status: DISCONTINUED | OUTPATIENT
Start: 2019-04-17 | End: 2019-04-17

## 2019-04-17 RX ORDER — MAGNESIUM SULFATE 1 G/100ML
1 INJECTION INTRAVENOUS ONCE
Status: DISCONTINUED | OUTPATIENT
Start: 2019-04-17 | End: 2019-05-15

## 2019-04-17 RX ADMIN — Medication 10 ML: at 20:09

## 2019-04-17 RX ADMIN — POLYVINYL ALCOHOL 1 DROP: 14 SOLUTION/ DROPS OPHTHALMIC at 00:16

## 2019-04-17 RX ADMIN — FENTANYL CITRATE 50 MCG: 50 INJECTION INTRAMUSCULAR; INTRAVENOUS at 18:23

## 2019-04-17 RX ADMIN — MINERAL OIL AND WHITE PETROLATUM: 150; 830 OINTMENT OPHTHALMIC at 04:40

## 2019-04-17 RX ADMIN — FENTANYL CITRATE 50 MCG: 50 INJECTION INTRAMUSCULAR; INTRAVENOUS at 21:59

## 2019-04-17 RX ADMIN — LEVALBUTEROL 1.25 MG: 1.25 SOLUTION, CONCENTRATE RESPIRATORY (INHALATION) at 03:59

## 2019-04-17 RX ADMIN — MINERAL OIL AND WHITE PETROLATUM: 150; 830 OINTMENT OPHTHALMIC at 00:16

## 2019-04-17 RX ADMIN — LEVALBUTEROL 1.25 MG: 1.25 SOLUTION, CONCENTRATE RESPIRATORY (INHALATION) at 11:58

## 2019-04-17 RX ADMIN — LEVALBUTEROL 1.25 MG: 1.25 SOLUTION, CONCENTRATE RESPIRATORY (INHALATION) at 19:28

## 2019-04-17 RX ADMIN — PROPOFOL 30 MCG/KG/MIN: 10 INJECTION, EMULSION INTRAVENOUS at 12:33

## 2019-04-17 RX ADMIN — PROPOFOL 45 MCG/KG/MIN: 10 INJECTION, EMULSION INTRAVENOUS at 18:37

## 2019-04-17 RX ADMIN — IPRATROPIUM BROMIDE 0.5 MG: 0.5 SOLUTION RESPIRATORY (INHALATION) at 08:49

## 2019-04-17 RX ADMIN — MAGNESIUM SULFATE HEPTAHYDRATE 1 G: 1 INJECTION, SOLUTION INTRAVENOUS at 06:36

## 2019-04-17 RX ADMIN — POLYVINYL ALCOHOL 1 DROP: 14 SOLUTION/ DROPS OPHTHALMIC at 20:09

## 2019-04-17 RX ADMIN — IPRATROPIUM BROMIDE 0.5 MG: 0.5 SOLUTION RESPIRATORY (INHALATION) at 23:26

## 2019-04-17 RX ADMIN — INSULIN LISPRO 4 UNITS: 100 INJECTION, SOLUTION INTRAVENOUS; SUBCUTANEOUS at 09:06

## 2019-04-17 RX ADMIN — INSULIN LISPRO 2 UNITS: 100 INJECTION, SOLUTION INTRAVENOUS; SUBCUTANEOUS at 01:11

## 2019-04-17 RX ADMIN — ENOXAPARIN SODIUM 40 MG: 100 INJECTION SUBCUTANEOUS at 09:05

## 2019-04-17 RX ADMIN — CHLORHEXIDINE GLUCONATE 0.12% ORAL RINSE 15 ML: 1.2 LIQUID ORAL at 20:09

## 2019-04-17 RX ADMIN — FENTANYL CITRATE 50 MCG: 50 INJECTION INTRAMUSCULAR; INTRAVENOUS at 08:50

## 2019-04-17 RX ADMIN — IPRATROPIUM BROMIDE 0.5 MG: 0.5 SOLUTION RESPIRATORY (INHALATION) at 19:28

## 2019-04-17 RX ADMIN — VANCOMYCIN HYDROCHLORIDE 1000 MG: 1 INJECTION, POWDER, LYOPHILIZED, FOR SOLUTION INTRAVENOUS at 13:54

## 2019-04-17 RX ADMIN — POLYVINYL ALCOHOL 1 DROP: 14 SOLUTION/ DROPS OPHTHALMIC at 11:22

## 2019-04-17 RX ADMIN — SODIUM PHOSPHATE, MONOBASIC, MONOHYDRATE 14.85 MMOL: 276; 142 INJECTION, SOLUTION INTRAVENOUS at 05:53

## 2019-04-17 RX ADMIN — LEVALBUTEROL 1.25 MG: 1.25 SOLUTION, CONCENTRATE RESPIRATORY (INHALATION) at 08:49

## 2019-04-17 RX ADMIN — SODIUM CHLORIDE, POTASSIUM CHLORIDE, SODIUM LACTATE AND CALCIUM CHLORIDE: 600; 310; 30; 20 INJECTION, SOLUTION INTRAVENOUS at 15:44

## 2019-04-17 RX ADMIN — IPRATROPIUM BROMIDE 0.5 MG: 0.5 SOLUTION RESPIRATORY (INHALATION) at 16:50

## 2019-04-17 RX ADMIN — PROPOFOL 35 MCG/KG/MIN: 10 INJECTION, EMULSION INTRAVENOUS at 01:05

## 2019-04-17 RX ADMIN — Medication 10 ML: at 09:22

## 2019-04-17 RX ADMIN — PANTOPRAZOLE SODIUM 40 MG: 40 INJECTION, POWDER, FOR SOLUTION INTRAVENOUS at 09:05

## 2019-04-17 RX ADMIN — POLYVINYL ALCOHOL 1 DROP: 14 SOLUTION/ DROPS OPHTHALMIC at 15:56

## 2019-04-17 RX ADMIN — LEVOFLOXACIN 500 MG: 5 INJECTION, SOLUTION INTRAVENOUS at 12:50

## 2019-04-17 RX ADMIN — CHLORHEXIDINE GLUCONATE 0.12% ORAL RINSE 15 ML: 1.2 LIQUID ORAL at 09:09

## 2019-04-17 RX ADMIN — METHYLPREDNISOLONE SODIUM SUCCINATE 40 MG: 40 INJECTION, POWDER, FOR SOLUTION INTRAMUSCULAR; INTRAVENOUS at 09:06

## 2019-04-17 RX ADMIN — METHYLPREDNISOLONE SODIUM SUCCINATE 40 MG: 40 INJECTION, POWDER, FOR SOLUTION INTRAMUSCULAR; INTRAVENOUS at 01:11

## 2019-04-17 RX ADMIN — IPRATROPIUM BROMIDE 0.5 MG: 0.5 SOLUTION RESPIRATORY (INHALATION) at 03:59

## 2019-04-17 RX ADMIN — METHYLPREDNISOLONE SODIUM SUCCINATE 40 MG: 40 INJECTION, POWDER, FOR SOLUTION INTRAMUSCULAR; INTRAVENOUS at 18:18

## 2019-04-17 RX ADMIN — LEVALBUTEROL 1.25 MG: 1.25 SOLUTION, CONCENTRATE RESPIRATORY (INHALATION) at 23:26

## 2019-04-17 RX ADMIN — INSULIN LISPRO 2 UNITS: 100 INJECTION, SOLUTION INTRAVENOUS; SUBCUTANEOUS at 18:18

## 2019-04-17 RX ADMIN — SODIUM PHOSPHATE, MONOBASIC, MONOHYDRATE 7.41 MMOL: 276; 142 INJECTION, SOLUTION INTRAVENOUS at 15:59

## 2019-04-17 RX ADMIN — SODIUM CHLORIDE, POTASSIUM CHLORIDE, SODIUM LACTATE AND CALCIUM CHLORIDE: 600; 310; 30; 20 INJECTION, SOLUTION INTRAVENOUS at 04:41

## 2019-04-17 RX ADMIN — METOCLOPRAMIDE 10 MG: 5 INJECTION, SOLUTION INTRAMUSCULAR; INTRAVENOUS at 18:38

## 2019-04-17 RX ADMIN — CALCIUM GLUCONATE: 94 INJECTION, SOLUTION INTRAVENOUS at 18:19

## 2019-04-17 RX ADMIN — POLYVINYL ALCOHOL 1 DROP: 14 SOLUTION/ DROPS OPHTHALMIC at 04:40

## 2019-04-17 RX ADMIN — VANCOMYCIN HYDROCHLORIDE 1000 MG: 1 INJECTION, POWDER, LYOPHILIZED, FOR SOLUTION INTRAVENOUS at 01:32

## 2019-04-17 RX ADMIN — MAGNESIUM SULFATE HEPTAHYDRATE 1 G: 1 INJECTION, SOLUTION INTRAVENOUS at 05:28

## 2019-04-17 RX ADMIN — IPRATROPIUM BROMIDE 0.5 MG: 0.5 SOLUTION RESPIRATORY (INHALATION) at 11:58

## 2019-04-17 RX ADMIN — LEVALBUTEROL 1.25 MG: 1.25 SOLUTION, CONCENTRATE RESPIRATORY (INHALATION) at 16:50

## 2019-04-17 RX ADMIN — POTASSIUM CHLORIDE: 2 INJECTION, SOLUTION, CONCENTRATE INTRAVENOUS at 05:45

## 2019-04-17 RX ADMIN — MINERAL OIL AND WHITE PETROLATUM: 150; 830 OINTMENT OPHTHALMIC at 20:09

## 2019-04-17 RX ADMIN — INSULIN LISPRO 2 UNITS: 100 INJECTION, SOLUTION INTRAVENOUS; SUBCUTANEOUS at 12:49

## 2019-04-17 ASSESSMENT — PULMONARY FUNCTION TESTS
PIF_VALUE: 10
PIF_VALUE: 11
PIF_VALUE: 8
PIF_VALUE: 10
PIF_VALUE: 7
PIF_VALUE: 11
PIF_VALUE: 8
PIF_VALUE: 9
PIF_VALUE: 10
PIF_VALUE: 9
PIF_VALUE: 13
PIF_VALUE: 9
PIF_VALUE: 6
PIF_VALUE: 6
PIF_VALUE: 13
PIF_VALUE: 10
PIF_VALUE: 12
PIF_VALUE: 13
PIF_VALUE: 18
PIF_VALUE: 8
PIF_VALUE: 9
PIF_VALUE: 13
PIF_VALUE: 9
PIF_VALUE: 9
PIF_VALUE: 11
PIF_VALUE: 18
PIF_VALUE: 6
PIF_VALUE: 34
PIF_VALUE: 13
PIF_VALUE: 13
PIF_VALUE: 15
PIF_VALUE: 15
PIF_VALUE: 7
PIF_VALUE: 7
PIF_VALUE: 31
PIF_VALUE: 15
PIF_VALUE: 8
PIF_VALUE: 11
PIF_VALUE: 13
PIF_VALUE: 6
PIF_VALUE: 10
PIF_VALUE: 13
PIF_VALUE: 14
PIF_VALUE: 11
PIF_VALUE: 13
PIF_VALUE: 9
PIF_VALUE: 10
PIF_VALUE: 11
PIF_VALUE: 5
PIF_VALUE: 13
PIF_VALUE: 11

## 2019-04-17 ASSESSMENT — ENCOUNTER SYMPTOMS: ABDOMINAL PAIN: 1

## 2019-04-17 ASSESSMENT — PAIN SCALES - GENERAL
PAINLEVEL_OUTOF10: 2
PAINLEVEL_OUTOF10: 0
PAINLEVEL_OUTOF10: 5
PAINLEVEL_OUTOF10: 0
PAINLEVEL_OUTOF10: 1
PAINLEVEL_OUTOF10: 4
PAINLEVEL_OUTOF10: 0

## 2019-04-17 NOTE — PROGRESS NOTES
Pharmacy Vancomycin Consult     Vancomycin Day: 6  Current Dosing: vancomycin 1000 mg every 12 hours     Temp max:  99    Recent Labs     04/15/19  0422 04/16/19  0407   BUN 6* 8       Recent Labs     04/15/19  0422 04/16/19  0407   CREATININE <0.40* <0.40*       Recent Labs     04/15/19  0422 04/16/19  0407   WBC 27.5* 18.6*         Intake/Output Summary (Last 24 hours) at 4/17/2019 0118  Last data filed at 4/16/2019 2200  Gross per 24 hour   Intake 7369.46 ml   Output 5325 ml   Net 2044.46 ml       Culture Date      Source                       Results  See micro    Ht Readings from Last 1 Encounters:   04/11/19 5' (1.524 m)        Wt Readings from Last 1 Encounters:   04/15/19 126 lb 5.2 oz (57.3 kg)         Body mass index is 24.67 kg/m². CrCl cannot be calculated (This lab value cannot be used to calculate CrCl because it is not a number: <0.40).     Trough: 15.5 at 0012 on 4/17/2019    Assessment/Plan:  Continue vancomycin 1000 mg every 12 hours

## 2019-04-17 NOTE — PROGRESS NOTES
Patient seen  Afebrile. Vitals are stable  Remains intubated  Continues to have high NG tube output.   The NG is being flushed every 2 hours  Abdomen remains diffusely tender with no rebound or guarding    Continue TPN  Will consider small bowel study to assess for small bowel obstruction with systolics clear

## 2019-04-17 NOTE — PROGRESS NOTES
[address]    PROGRESS NOTE             4/17/2019    2:52 PM    Name:   Winston Martinez  MRN:     [id number]     Acct:      [de-identified]   Room:   2002/2002-01  IP Day:  6  Admit Date:  4/11/2019  2:48 PM    PCP:  Marline Mcguire DO  Code Status:  Full Code    Subjective: Interval History Status: worsened. Patient seen and examined at bedside in the ICU. Intubated. Off of pressors. Nurse Zachariah Joseph present at bedside. Electrolytes being replaced. Patient complaining of left-sided chest pain by \"nodding\" in response to questions. Ordered troponins and STAT ECG. Brief History:     Per EMR:     The patient is a 77 y.o.  Female who presents withAbdominal Pain   and she is admitted to the hospital for the management of abdominal distension, nausea, vomiting, and acute cystitis.      Patient presents with chills, nausea, vomiting, abdominal pain (diffuse, but worse in the suprapubic region), abdominal distension, and dysuria of 2-3 days duration. Denies hematemesis. Acknowledges difficulty keeping food down but tolerating fluids. States abdominal pain is a 6/10 on average, and denies trying any medication to alleviate her sx.      Denies recent antibiotic use or steroid use.      ED: Tachycardic 100-114 BP, WBC 47.9 w/neutrophils 88,  UCx from 4/2 + E. Coli > 100,000 w/suceptibility to cipro. Review of Systems:     Review of Systems   Unable to perform ROS: Intubated   Constitutional: Negative for chills and fever. Cardiovascular: Positive for chest pain (Left-sided). Gastrointestinal: Positive for abdominal pain. Neurological: Negative for headaches. Medications: Allergies:     Allergies   Allergen Reactions    Penicillins Shortness Of Breath and Rash    Amoxicillin        Current Meds:   Scheduled Meds:    magnesium sulfate  1 g Intravenous Once    pantoprazole  40 mg Intravenous Daily And    sodium chloride (PF)  10 mL Intravenous Daily    methylPREDNISolone  40 mg Intravenous Q8H    sodium chloride  250 mL Intravenous Once    vancomycin  1,000 mg Intravenous Q12H    polyvinyl alcohol  1 drop Both Eyes Q4H    And    lubrifresh P.M. Both Eyes Q4H    chlorhexidine  15 mL Mouth/Throat BID    ipratropium  0.5 mg Nebulization Q4H    insulin lispro  0-12 Units Subcutaneous Q6H    levalbuterol  1.25 mg Nebulization Q4H    docusate sodium  100 mg Oral TID    vancomycin (VANCOCIN) intermittent dosing (placeholder)   Other RX Placeholder    levofloxacin  500 mg Intravenous Q24H    venlafaxine  37.5 mg Oral TID    clopidogrel  75 mg Oral Daily    aspirin  81 mg Oral Daily    sodium chloride flush  10 mL Intravenous 2 times per day    enoxaparin  40 mg Subcutaneous Daily     Continuous Infusions:    PN-Adult 2-in-1 Central Line (Standard)      PN-Adult 2-in-1 [employer] (Standard) 65 mL/hr at 04/16/19 1754    propofol 30 mcg/kg/min (04/17/19 1233)    vasopressin (Septic Shock) infusion Stopped (04/14/19 1930)    norepinephrine Stopped (04/16/19 2102)    lactated ringers 125 mL/hr at 04/17/19 0441    dextrose       PRN Meds: fentanNYL, sodium chloride nebulizer, fentanNYL, oxyCODONE-acetaminophen, magnesium sulfate, sodium phosphate IVPB **OR** sodium phosphate IVPB, potassium chloride **OR** potassium alternative oral replacement **OR** potassium chloride, glucose, dextrose, glucagon (rDNA), dextrose, milk and molasses, sodium chloride flush, acetaminophen    Data:     Past Medical History:   has a past medical history of Abnormal computed tomography of cervical spine, CVA (cerebral vascular accident) ([address] GERD (gastroesophageal reflux disease), Hypertension, Paraproteinemia, and Weight loss. Social History:   reports that she has been smoking. She has a 30.00 pack-year smoking history.  She has never used smokeless tobacco. She reports that she drank about 16.8 oz of alcohol per week. She reports that she does not use drugs. Family History: History reviewed. No pertinent family history. Vitals:  BP (!) 105/56   Pulse 100   Temp 98 °F (36.7 °C) (Axillary)   Resp 23   Ht 5' (1.524 m)   Wt 130 lb 8.2 oz (59.2 kg)   SpO2 99%   BMI 25.49 kg/m²   Temp (24hrs), Av.2 °F (36.8 °C), Min:97.7 °F (36.5 °C), Max:98.7 °F (37.1 °C)    Recent Labs     19  1957 19  0108 19  0757 19  1107   POCGLU 165* 154* 222* 186*       I/O(24Hr): Intake/Output Summary (Last 24 hours) at 2019 1452  Last data filed at 2019 1233  Gross per 24 hour   Intake 7972.35 ml   Output 6275 ml   Net 1697.35 ml       Labs:    [unfilled]    Lab Results   Component Value Date/Time    SPECIAL NOT REPORTED 2019 03:05 PM     Lab Results   Component Value Date/Time    CULTURE (A) 2019 03:05 PM     YEAST, NOT CANDIDA ALBICANS OR CANDIDA DUBLINIENSIS  HEAVY GROWTH      CULTURE PRESUMPTIVE CANDIDA ALBICANS  HEAVY GROWTH   (A) 2019 03:05 PM    CULTURE NO NORMAL HILL (A) 2019 03:05 PM       [unfilled]    Radiology:    Alan Luz Femur Left (min 2 Views)    Result Date: 3/27/2019  EXAMINATION: 4 XRAY VIEWS OF THE LEFT FEMUR; 2 XRAY VIEWS OF THE LEFT KNEE; 3 XRAY VIEWS OF THE LEFT TIBIA AND FIBULA 3/27/2019 7:00 pm COMPARISON: Left hip 2015. HISTORY: ORDERING SYSTEM PROVIDED HISTORY: pain TECHNOLOGIST PROVIDED HISTORY: pain Ordering Physician Provided Reason for Exam: pt twisted wrong, lt knee pain, unable to straighten knee. Acuity: Acute Type of Exam: Initial FINDINGS: Left femur, four views: The bones are diffusely osteopenic. No acute fracture deformity. The hip joint is maintained. The femoral head projects within the acetabulum. No focal soft tissue abnormality is seen. Left knee, two views: The knee is flexed. No acute osseous abnormality. No joint effusion.   Joint spaces are not optimally profiled but no significant arthritic changes are apparent. Left tib-fib, three views: There is evidence of a remote healed distal tibia fracture. No acute fracture or dislocation is seen. No focal soft tissue abnormality. No acute osseous abnormality of the left femur. No acute osseous abnormality of the left knee. No acute osseous abnormality of the left tib-fib. Healed remote distal tibia fracture. Marked osteopenia, likely due to disuse. Xr Knee Left (1-2 Views)    Result Date: 3/27/2019  EXAMINATION: 4 XRAY VIEWS OF THE LEFT FEMUR; 2 XRAY VIEWS OF THE LEFT KNEE; 3 XRAY VIEWS OF THE LEFT TIBIA AND FIBULA 3/27/2019 7:00 pm COMPARISON: Left hip 11/14/2015. HISTORY: ORDERING SYSTEM PROVIDED HISTORY: pain TECHNOLOGIST PROVIDED HISTORY: pain Ordering Physician Provided Reason for Exam: pt twisted wrong, lt knee pain, unable to straighten knee. Acuity: Acute Type of Exam: Initial FINDINGS: Left femur, four views: The bones are diffusely osteopenic. No acute fracture deformity. The hip joint is maintained. The femoral head projects within the acetabulum. No focal soft tissue abnormality is seen. Left knee, two views: The knee is flexed. No acute osseous abnormality. No joint effusion. Joint spaces are not optimally profiled but no significant arthritic changes are apparent. Left tib-fib, three views: There is evidence of a remote healed distal tibia fracture. No acute fracture or dislocation is seen. No focal soft tissue abnormality. No acute osseous abnormality of the left femur. No acute osseous abnormality of the left knee. No acute osseous abnormality of the left tib-fib. Healed remote distal tibia fracture. Marked osteopenia, likely due to disuse.      Xr Knee Left (3 Views)    Result Date: 3/30/2019  EXAMINATION: 3 XRAY VIEWS OF THE LEFT KNEE 3/30/2019 10:58 am COMPARISON: March 27, 2018 HISTORY: ORDERING SYSTEM PROVIDED HISTORY: F/u L knee pain after cast TECHNOLOGIST PROVIDED HISTORY: AP, oblique, lateral F/u L knee pain after cast FINDINGS: Marked osteopenia. Interval casting, which degrades fine osseous detail question cortical offset in the lateral femoral metaphysis. No malalignment identified. No significant joint effusion. Interval casting. Question nondisplaced fracture in the lateral femoral metaphysis. Osteopenia. Xr Tibia Fibula Left (2 Views)    Result Date: 3/27/2019  EXAMINATION: 4 XRAY VIEWS OF THE LEFT FEMUR; 2 XRAY VIEWS OF THE LEFT KNEE; 3 XRAY VIEWS OF THE LEFT TIBIA AND FIBULA 3/27/2019 7:00 pm COMPARISON: Left hip 11/14/2015. HISTORY: ORDERING SYSTEM PROVIDED HISTORY: pain TECHNOLOGIST PROVIDED HISTORY: pain Ordering Physician Provided Reason for Exam: pt twisted wrong, lt knee pain, unable to straighten knee. Acuity: Acute Type of Exam: Initial FINDINGS: Left femur, four views: The bones are diffusely osteopenic. No acute fracture deformity. The hip joint is maintained. The femoral head projects within the acetabulum. No focal soft tissue abnormality is seen. Left knee, two views: The knee is flexed. No acute osseous abnormality. No joint effusion. Joint spaces are not optimally profiled but no significant arthritic changes are apparent. Left tib-fib, three views: There is evidence of a remote healed distal tibia fracture. No acute fracture or dislocation is seen. No focal soft tissue abnormality. No acute osseous abnormality of the left femur. No acute osseous abnormality of the left knee. No acute osseous abnormality of the left tib-fib. Healed remote distal tibia fracture. Marked osteopenia, likely due to disuse. Ct Abdomen Pelvis W Iv Contrast    Result Date: 4/11/2019  EXAMINATION: CT OF THE ABDOMEN AND PELVIS WITH CONTRAST 4/11/2019 5:14 pm TECHNIQUE: CT of the abdomen and pelvis was performed with the administration of intravenous contrast. Multiplanar reformatted images are provided for review.  Dose modulation, iterative reconstruction, and/or weight based adjustment of the mA/kV was process is noted. No adjacent enlarged lymph nodes. 2. Trace pleural fluid. 3. Marked distention of the stomach. This appears to extend to the pylorus. Partial gastric outlet obstruction is not excluded. 4. Bilateral dilated ureters without obstructing calculi. Urinary bladder is markedly distended with bladder wall air suggesting emphysematous cystitis. Dilatation of the ureters may be related to reflux or infection. 5. Atherosclerotic disease. 6. Other findings as above. Critical results were called by Dr. Toña Grover MD to [address] on 4/11/2019 at 17:37. Xr Chest Portable    Result Date: 4/12/2019  EXAMINATION: SINGLE XRAY VIEW OF THE CHEST 4/12/2019 5:26 am COMPARISON: November 14, 2015. HISTORY: ORDERING SYSTEM PROVIDED HISTORY: line placement TECHNOLOGIST PROVIDED HISTORY: line placement Ordering Physician Provided Reason for Exam: New right side line placement. Acuity: Acute Type of Exam: Initial Additional signs and symptoms: New right side line placement. FINDINGS: Stable left pectoral trans venous cardiac pacer device. New right IJ central venous catheter with tip near the superior atrial caval junction. Normal lung volume. No new consolidation. Curvilinear radiopacity projecting over the right upper lobe likely represents artifact from a skin fold. No pleural effusion or pneumothorax. Stable cardiomediastinal silhouette and great vessels with redemonstration of atherosclerotic thoracic aorta. New right IJ central venous catheter with tip near the superior atrial caval junction. No pneumothorax. No new consolidation. Physical Examination:        Physical Exam   Constitutional: She appears well-developed. She appears cachectic. She appears ill. No distress. Intubated   HENT:   Head: Normocephalic and atraumatic. Eyes: Pupils are equal, round, and reactive to light. Conjunctivae and EOM are normal.   Neck: Normal range of motion. Right IJ central line in place.  Site clean and dry. Cardiovascular: Normal rate, regular rhythm, normal heart sounds and intact distal pulses. Exam reveals no gallop and no friction rub. No murmur heard. Pulmonary/Chest: Effort normal. No stridor. No respiratory distress. She has no wheezes. She has rales. Intubated. Abdominal: Soft. Bowel sounds are normal. She exhibits no mass. There is tenderness (Decreased tenderness to light palpation. Improvement from prior. ). There is no rebound and no guarding. Musculoskeletal: Normal range of motion. She exhibits edema (Anasarca; 3+ b/l pitting edema in upper and lower ext. ). Left knee wrapped in dressing. Posterior bruising noted. Neurological:   Intubated, sedated   Skin: Skin is warm and dry. She is not diaphoretic. There is pallor. Assessment:        Primary Problem  Sepsis due to urinary tract infection 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 Coli Emphysematous Cystitis + MRSA PNA   WBC 17   Levaquin, Vanc   CXR multifocal airspace disease, left basilar consolidation/effusion   Off of levophed at this time   ECHO 4/12: LVEF 45%, RVSP 36 mmHg   Urology consult, appreciate recs --> cont cath until out of ICU and stable   Critical care consult, appreciate recs   Gen Surg, right IJ central line placed on 4/12   ID consult, appreciate recs   Resp Cx: mod budding yeast --> will defer to ID     Acute hypoxic resp failure 2/2 poss aspiration PNA   Intubated, sedated    Pulm/CC following   Weaning steroids    GI Malignancy ruled out by EGD, likely presenting w/Gastroparesis   Hx of BMs during this

## 2019-04-17 NOTE — PROGRESS NOTES
Nutrition Assessment (Parenteral Nutrition)    Type and Reason for Visit: Reassess    Nutrition Recommendations: Following changes made in additives: NaCl- 40 to 45 mEq, K acetate- 35 to 40 mEq, K Phos-40 to 45 mmol,KCl- 30 to 50 mEq, Ca+- 8 to 9 mEq, insulin- 10 to 22 units, add MVI & trace elements. Nutrition Assessment: Pt stable from a nutritional standpoint TPN continuing at 65 ml/hr, Propofol at 10.3 ml providing 272 kcal. Dr. Sil Bell noted plan to do SBFT once stomach has cleared. Malnutrition Assessment:  · Malnutrition Status: At risk for malnutrition  · Context: Acute illness or injury  · Findings of the 6 clinical characteristics of malnutrition (Minimum of 2 out of 6 clinical characteristics is required to make the diagnosis of moderate or severe Protein Calorie Malnutrition based on AND/ASPEN Guidelines):  1. Weight Loss-(edema present),    2. Fat Loss-Unable to assess(unable to do physical exam-RN didn't want her disturbed),    3. Muscle Loss-Unable to assess,    4. Fluid Accumulation-Moderate to severe fluid accumulation,    5.  Strength-Not measured    Nutrition Risk Level: High    Nutrient Needs:  · Estimated Daily Total Kcal: 7212-1376 based on 25-27 kcal/kg current wt(revised)  · Estimated Daily Protein (g): 63-68 based on 1.4-1.5 gm/kg IBW(revised)    Nutrition Diagnosis:   · Problem: Inadequate oral intake  · Etiology: related to Alteration in GI function, Insufficient energy/nutrient consumption     Signs and symptoms:  as evidenced by NPO status due to medical condition, Nutrition support - PN    Objective Information:  · Nutrition-Focused Physical Findings: Active bowel sounds.  Edema: +2 pitting BUE's, +1 pitting BLE's  · Wound Type: Pressure Ulcer, Stage I(Coccyx)  · Current Nutrition Therapies:  · Oral Diet Orders: NPO   · Oral Diet intake: NPO  · Oral Nutrition Supplement (ONS) Orders: None  · ONS intake: NPO  · Parenteral Nutrition Orders:  · Type and Formula: 2-in-1 Custom, [employer]   · Lipids: None(On Propofol & TG elevated)  · Rate/Volume: 65 ml/ 1560 ml daily  · Duration: Continuous  · Current PN Order Provides: 1373 kcal, 78 gm pro  · Goal PN Orders Provides: 8004-0854 kcal; 63-68 gm pro  · Additional Calories: Propofol @ 10.3 ml/hr = 272 kcal  · Anthropometric Measures:  · Ht: 5' (152.4 cm)   · Current Body Wt: 130 lb (59 kg)(increased edema)  · Admission Body Wt: 102 lb (46.3 kg)  · Ideal Body Wt: 100 lb (45.4 kg), % Ideal Body 130%  · BMI Classification: BMI 25.0 - 29.9 Overweight    Nutrition Interventions:   Continue NPO, Modify current Parenteral Nutrition  Continued Inpatient Monitoring    Nutrition Evaluation:   · Evaluation: Progressing toward goals   · Goals: PN to meet greater 90% of estimated nutrition needs   · Monitoring: Nutrition Progression, PN Intake, PN Tolerance, Wound Healing, I&O, Weight, Pertinent Labs, Monitor Bowel Function      ALL Kapoor R.D.   Clinical Dietitian  Office: [phone]

## 2019-04-17 NOTE — PLAN OF CARE
Problem: Risk for Impaired Skin Integrity  Goal: Tissue integrity - skin and mucous membranes  Description  Structural intactness and normal physiological function of skin and  mucous membranes. 4/17/2019 0347 by Debbie Stark RN  Outcome: Ongoing  Note:   Pt turned, and restraints released every 2 hours or sooner as needed. Pt free from breakdown. Problem: Falls - Risk of:  Goal: Will remain free from falls  Description  Will remain free from falls  4/17/2019 0347 by Debbie Stark RN  Outcome: Ongoing  Note:   Pt intubated and on sedation this shift. Not out of bed, free from falls. Problem: Infection, Septic Shock:  Goal: Will show no infection signs and symptoms  Description  Will show no infection signs and symptoms  4/17/2019 0347 by Debbie Stark RN  Outcome: Ongoing  Note:   Pt afebrile this shift, no fevers or new signs of infection. Vanco and levaquin on. Problem: Nutrition  Goal: Optimal nutrition therapy  4/17/2019 0347 by Debbie Stark RN  Outcome: Ongoing  Note:   TPN running at 65 this shift. NO TF due to GI bleed. Nutrition adequate this shift. Problem: Restraint Use - Nonviolent/Non-Self-Destructive Behavior:  Goal: Absence of restraint indications  Description  Absence of restraint indications  4/17/2019 0347 by Debbie Stark RN  Outcome: Ongoing  Note:   Pt still intubated this shift, reaching for lines and tubes with ROM and turns. Problem: Restraint Use - Nonviolent/Non-Self-Destructive Behavior:  Goal: Absence of restraint-related injury  Description  Absence of restraint-related injury  4/17/2019 0347 by Debbie Stark RN  Outcome: Ongoing  Note:   Restraints released every 2 hours or sooner as needed, no redness or breakdown noted this shift. No injury this shift.

## 2019-04-17 NOTE — CARE COORDINATION
ONGOING DISCHARGE PLAN:    Reviewed patients chart regarding discharge plan and chart still confirms the plan is to discharge to [facility] [address]  Per ID       Sepsis due to [facility] Emphysematous cystitis     Aspiration pneumonia of right lower lobe. Septic shock     Yeast growth on sputum culture suspect contamination     Leukemoid reaction    Acute hypoxic resp failure     MRSA carrier    Urinary retention ,Tran cath in place     Anemia                    Recommendations:   Continue IV Levaquin and Vancomycin for now . Follow CBC ,vancomycin level and renal function closely . Continue supportive care           Will review plan with patient and or family    Pt remains on the vent   Will continue to follow for additional discharge needs.      Electronically signed by Rafael Iglesias RN on 4/17/2019 at 11:32 AM

## 2019-04-17 NOTE — PLAN OF CARE
Problem: Risk for Impaired Skin Integrity  Goal: Tissue integrity - skin and mucous membranes  Description  Structural intactness and normal physiological function of skin and  mucous membranes.   Outcome: Ongoing     Problem: Falls - Risk of:  Goal: Will remain free from falls  Description  Will remain free from falls  Outcome: Ongoing  Goal: Absence of physical injury  Description  Absence of physical injury  Outcome: Ongoing     Problem: Infection, Septic Shock:  Goal: Will show no infection signs and symptoms  Description  Will show no infection signs and symptoms  Outcome: Ongoing     Problem: Tissue Perfusion, Altered:  Goal: Circulatory function within specified parameters  Description  Circulatory function within specified parameters  Outcome: Ongoing     Problem: Pain:  Goal: Pain level will decrease  Description  Pain level will decrease  Outcome: Ongoing  Goal: Control of acute pain  Description  Control of acute pain  Outcome: Ongoing  Goal: Control of chronic pain  Description  Control of chronic pain  Outcome: Ongoing     Problem: Nutrition  Goal: Optimal nutrition therapy  4/17/2019 1845 by Chema Cortés RN  Outcome: Ongoing  4/17/2019 1451 by Shasta Douglas RD, LD  Outcome: Ongoing     Problem: Restraint Use - Nonviolent/Non-Self-Destructive Behavior:  Goal: Absence of restraint indications  Description  Absence of restraint indications  Outcome: Ongoing  Goal: Absence of restraint-related injury  Description  Absence of restraint-related injury  Outcome: Ongoing

## 2019-04-17 NOTE — PLAN OF CARE
Problem: Restraint Use - Nonviolent/Non-Self-Destructive Behavior:  Goal: Absence of restraint indications  Description  Absence of restraint indications  4/17/2019 1846 by Timo Cisse RN  Outcome: Ongoing  4/17/2019 1845 by Timo Cisse RN  Outcome: Ongoing   Patient continues to reach for et tube with turns.  Restraints maintained to protect airway

## 2019-04-17 NOTE — PROGRESS NOTES
Penicillins Shortness Of Breath and Rash    Amoxicillin         Social   Social History     Tobacco Use    Smoking status: Current Some Day Smoker     Packs/day: 1.00     Years: 30.00     Pack years: 30.00    Smokeless tobacco: Never Used   Substance Use Topics    Alcohol use: Not Currently     Alcohol/week: 16.8 oz     Types: 28 Glasses of wine per week                History reviewed. No pertinent family history. Review of Systems  Intubated unable to obtain     Tolerating antibiotics. Physical Exam  BP (!) 101/58   Pulse 93   Temp 97.8 °F (36.6 °C) (Axillary)   Resp 23   Ht 5' (1.524 m)   Wt 130 lb 8.2 oz (59.2 kg)   SpO2 100%   BMI 25.49 kg/m²           General Appearance: intubated ,sedated . Skin: warm and dry, no rash or erythema  Head: normocephalic and atraumatic  Eyes: pupils equal, round  Neck: neck supple and non tender   Pulmonary/Chest: coarse to auscultation bilaterally- no wheezes, rales or rhonchi  Cardiovascular: normal rate, regular rhythm, normal S1 and S2, no murmurs. Abdomen: soft, -distended, normal bowel sounds, no masses or organomegaly  Extremities: no cyanosis, clubbing   Edema   Musculoskeletal: left knee cast   Right IJ line   Tran in place    Data Review:    Recent Labs     04/15/19  0422  04/16/19  0407  04/16/19  1958 04/17/19  0012 04/17/19  0435   WBC 27.5*  --  18.6*  --   --   --  17.0*   HGB 8.8*   < > 7.2*   < > 11.2* 10.8* 11.2*   HCT 25.8*   < > 21.2*   < > 33.5* 32.0* 32.5*   MCV 85.7  --  85.9  --   --   --  88.8     --  192  --   --   --  157    < > = values in this interval not displayed.      Recent Labs     04/15/19  0422 04/16/19  0407 04/17/19  0435   * 138 138   K 3.1* 2.9* 3.0*    104 100   CO2 23 25 29   PHOS  --  0.7* 1.4*   BUN 6* 8 8   CREATININE <0.40* <0.40* <0.40*     Recent Labs     04/15/19  0422 04/16/19  0407 04/17/19  0435   AST 48* 36* 33*   ALT 20 17 17   BILITOT 0.74 0.44 0.53   ALKPHOS 126* 99 96     No 4 XRAY VIEWS OF THE LEFT FEMUR; 2 XRAY VIEWS OF THE LEFT KNEE; 3 XRAY VIEWS OF THE LEFT TIBIA AND FIBULA 3/27/2019 7:00 pm COMPARISON: Left hip 11/14/2015. HISTORY: ORDERING SYSTEM PROVIDED HISTORY: pain TECHNOLOGIST PROVIDED HISTORY: pain Ordering Physician Provided Reason for Exam: pt twisted wrong, lt knee pain, unable to straighten knee. Acuity: Acute Type of Exam: Initial FINDINGS: Left femur, four views: The bones are diffusely osteopenic. No acute fracture deformity. The hip joint is maintained. The femoral head projects within the acetabulum. No focal soft tissue abnormality is seen. Left knee, two views: The knee is flexed. No acute osseous abnormality. No joint effusion. Joint spaces are not optimally profiled but no significant arthritic changes are apparent. Left tib-fib, three views: There is evidence of a remote healed distal tibia fracture. No acute fracture or dislocation is seen. No focal soft tissue abnormality. No acute osseous abnormality of the left femur. No acute osseous abnormality of the left knee. No acute osseous abnormality of the left tib-fib. Healed remote distal tibia fracture. Marked osteopenia, likely due to disuse. Xr Knee Left (1-2 Views)    Result Date: 3/27/2019  EXAMINATION: 4 XRAY VIEWS OF THE LEFT FEMUR; 2 XRAY VIEWS OF THE LEFT KNEE; 3 XRAY VIEWS OF THE LEFT TIBIA AND FIBULA 3/27/2019 7:00 pm COMPARISON: Left hip 11/14/2015. HISTORY: ORDERING SYSTEM PROVIDED HISTORY: pain TECHNOLOGIST PROVIDED HISTORY: pain Ordering Physician Provided Reason for Exam: pt twisted wrong, lt knee pain, unable to straighten knee. Acuity: Acute Type of Exam: Initial FINDINGS: Left femur, four views: The bones are diffusely osteopenic. No acute fracture deformity. The hip joint is maintained. The femoral head projects within the acetabulum. No focal soft tissue abnormality is seen. Left knee, two views: The knee is flexed. No acute osseous abnormality. No joint effusion. Joint spaces are not optimally profiled but no significant arthritic changes are apparent. Left tib-fib, three views: There is evidence of a remote healed distal tibia fracture. No acute fracture or dislocation is seen. No focal soft tissue abnormality. No acute osseous abnormality of the left femur. No acute osseous abnormality of the left knee. No acute osseous abnormality of the left tib-fib. Healed remote distal tibia fracture. Marked osteopenia, likely due to disuse. Xr Knee Left (3 Views)    Result Date: 3/30/2019  EXAMINATION: 3 XRAY VIEWS OF THE LEFT KNEE 3/30/2019 10:58 am COMPARISON: March 27, 2018 HISTORY: ORDERING SYSTEM PROVIDED HISTORY: F/u L knee pain after cast TECHNOLOGIST PROVIDED HISTORY: AP, oblique, lateral F/u L knee pain after cast FINDINGS: Marked osteopenia. Interval casting, which degrades fine osseous detail question cortical offset in the lateral femoral metaphysis. No malalignment identified. No significant joint effusion. Interval casting. Question nondisplaced fracture in the lateral femoral metaphysis. Osteopenia. Xr Tibia Fibula Left (2 Views)    Result Date: 3/27/2019  EXAMINATION: 4 XRAY VIEWS OF THE LEFT FEMUR; 2 XRAY VIEWS OF THE LEFT KNEE; 3 XRAY VIEWS OF THE LEFT TIBIA AND FIBULA 3/27/2019 7:00 pm COMPARISON: Left hip 11/14/2015. HISTORY: ORDERING SYSTEM PROVIDED HISTORY: pain TECHNOLOGIST PROVIDED HISTORY: pain Ordering Physician Provided Reason for Exam: pt twisted wrong, lt knee pain, unable to straighten knee. Acuity: Acute Type of Exam: Initial FINDINGS: Left femur, four views: The bones are diffusely osteopenic. No acute fracture deformity. The hip joint is maintained. The femoral head projects within the acetabulum. No focal soft tissue abnormality is seen. Left knee, two views: The knee is flexed. No acute osseous abnormality. No joint effusion. Joint spaces are not optimally profiled but no significant arthritic changes are apparent.  Left tib-fib, three views: There is evidence of a remote healed distal tibia fracture. No acute fracture or dislocation is seen. No focal soft tissue abnormality. No acute osseous abnormality of the left femur. No acute osseous abnormality of the left knee. No acute osseous abnormality of the left tib-fib. Healed remote distal tibia fracture. Marked osteopenia, likely due to disuse. Xr Abdomen (kub) (single Ap View)    Result Date: 4/14/2019  EXAMINATION: SINGLE SUPINE XRAY VIEW(S) OF THE ABDOMEN 4/14/2019 7:39 am COMPARISON: CT abdomen and pelvis  film from 11 April 2019 HISTORY: [address] [facility] [address]: Abd Distention TECHNOLOGIST PROVIDED HISTORY: Abd Distention Ordering Physician Provided Reason for Exam: Abdominal distention. Pt was moving around in the bed. Best films at present time. Acuity: Acute Type of Exam: Initial Additional signs and symptoms: Abdominal distention. Pt was moving around in the bed. Best films at present time. FINDINGS: Portable view time stamped at 748 hours demonstrates an intestinal tube terminating in the midportion of a gaseous Spike dilated stomach. Densities are present over the stomach likely medication. Bipolar pacemaker is in situ with intact leads. Heart size is top-normal, stable. Gaseous distension of the stomach and loop of bowel in the upper mid abdomen is noted but there is gas and fecal material in the rectum. Gastric outlet obstruction or proximal small bowel partial obstruction is suspected. No free air is noted. Midline city is present over the pelvis likely a monitor or CT small bore catheter. Vascular calcification is present in the pelvis. Persistent preferential gaseous distension of the stomach although there is some gas in the upper abdomen and gas and fecal material present in the rectosigmoid. Findings suggest gastric outlet obstruction.      Ct Abdomen Pelvis W Iv Contrast    Result Date: 4/11/2019  EXAMINATION: CT OF THE ABDOMEN AND PELVIS WITH CONTRAST 4/11/2019 5:14 pm TECHNIQUE: CT of the abdomen and pelvis was performed with the administration of intravenous contrast. Multiplanar reformatted images are provided for review. Dose modulation, iterative reconstruction, and/or weight based adjustment of the mA/kV was utilized to reduce the radiation dose to as low as reasonably achievable. COMPARISON: None. HISTORY: ORDERING SYSTEM PROVIDED HISTORY: Abdominal pain TECHNOLOGIST PROVIDED HISTORY: IV Only Contrast Ordering Physician Provided Reason for Exam: patient c/o abd pain for an hour FINDINGS: Lower Chest: Trace pleural fluid bilaterally is noted. Indwelling cardiac pacemaker is present. Heart size is normal.  Coronary artery calcifications are evident. The esophagus is significantly dilated and fluid-filled. No paraesophageal adenopathy is evident. Organs: The spleen, pancreas, and adrenals are unremarkable. The liver contains hypodensities, likely cysts. The gallbladder is distended and contains a solitary gallstone. The kidneys excrete contrast bilaterally. Extrarenal collecting systems are noted. The ureters are mildly dilated down to the urinary bladder without evidence of intraluminal or ureteral obstructing calculi. GI/Bowel: Marked distention of the stomach is noted; this continues to the pylorus with appearance suggesting partial gastric outlet obstruction. Fluid is present in some small bowel loops and colon distal to the stomach. No small bowel obstruction. Pelvis: Marked distention of the urinary bladder is noted. 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 Distended ureters may be related to reflux or infection. No free pelvic fluid, pelvic or inguinal adenopathy is noted. Peritoneum/Retroperitoneum: No aortic aneurysm. Mild to moderate plaque is noted in the infrarenal abdominal aorta and both proximal iliac arteries.  Shotty lymph nodes are present around the aorta in the upper abdomen. No mesenteric adenopathy is noted. Bones/Soft Tissues: Degenerative changes are present in the hips and lower lumbar facets. Estimated biologic radiation dose for this procedure:258.77 mGy/cm2.     1. Dilatation of the thoracic esophagus filled with fluid. No obstructive process is noted. No adjacent enlarged lymph nodes. 2. Trace pleural fluid. 3. Marked distention of the stomach. This appears to extend to the pylorus. Partial gastric outlet obstruction is not excluded. 4. Bilateral dilated ureters without obstructing calculi. Urinary bladder is markedly distended with bladder wall air suggesting emphysematous cystitis. Dilatation of the ureters may be related to reflux or infection. 5. Atherosclerotic disease. 6. Other findings as above. Critical results were called by Dr. Helen Bingham MD to [address] on 4/11/2019 at 17:37. Xr Chest Portable    Result Date: 4/16/2019  EXAMINATION: SINGLE XRAY VIEW OF THE CHEST 4/16/2019 6:54 am COMPARISON: 04/15/2019, 610 hours HISTORY: ORDERING SYSTEM PROVIDED HISTORY: ETT placement TECHNOLOGIST PROVIDED HISTORY: ETT placement Ordering Physician Provided Reason for Exam: on vent Acuity: Acute Type of Exam: Initial 70-year-old female on ventilator; check endotracheal tube placement FINDINGS: Portable AP upright view of the chest. Endotracheal tube distal tip overlying the mid trachea approximately 4.1 cm above the level of the ron. Enteric tube traverses the GE junction with distal tip excluded from the field of view. Left subclavian approach cardiac pacemaker device distal lead tips relatively stable in position. Right internal jugular approach central venous catheter distal tip overlying the high right atrium, stable. Cardiac monitor leads overlie the chest. Atherosclerotic calcification of the thoracic aorta. Slight stable volume loss of the left hemithorax. No pneumothorax. No free air.   Dense retrocardiac/left basilar airspace consolidation and small left-sided pleural effusion. Stable mild focal opacity at the right mid lung zone. Underlying COPD. Stable mild pulmonary vascular congestion and left-sided predominant parahilar opacity. Visualized osseous structures remain unchanged. 1. Stable multifocal airspace disease as detailed above with dense retrocardiac/left basilar airspace consolidation and small left-sided pleural effusion. Mild pulmonary vascular congestion. Findings may represent edema or multifocal pneumonia. 2. Underlying COPD. 3. Tubes and line as detailed above. Xr Chest Portable    Result Date: 4/15/2019  EXAMINATION: SINGLE XRAY VIEW OF THE CHEST 4/15/2019 6:47 am COMPARISON: 14 April 2019 HISTORY: ORDERING SYSTEM PROVIDED HISTORY: ETT placement TECHNOLOGIST PROVIDED HISTORY: ETT placement Ordering Physician Provided Reason for Exam: on vent Acuity: Acute Type of Exam: Initial FINDINGS: AP portable view of the chest time stamped at 612 hours demonstrates overlying cardiac monitoring electrodes. Endotracheal tube terminates 4 cm above the ron. Bipolar pacemaker enters from the left with intact leads in appropriate positions. Intestinal tube extends beyond the fundus of the stomach, tip not included. Right internal jugular catheter terminates at the cavoatrial junction. Heart size is normal.  Aortic arch is calcified. There is interval improvement in vascular congestion with resolution of perihilar opacities. Some bibasilar opacities remain. No extrapleural air is noted. Osseous structures are stable. Interval improvement in vascular congestion and bilateral opacities consistent with resolving pulmonary edema. Tubes and lines as above. Xr Chest Portable    Result Date: 4/14/2019  EXAMINATION: SINGLE XRAY VIEW OF THE CHEST 4/14/2019 7:57 am COMPARISON: Portable chest 04/13/2019.  HISTORY: ORDERING SYSTEM PROVIDED HISTORY: Intubation TECHNOLOGIST PROVIDED HISTORY: Intubation Ordering Physician Provided Reason for Exam: intubation Acuity: Acute Type of Exam: Initial Additional signs and symptoms: intubation FINDINGS: Endotracheal tube terminates over the midthoracic trachea. Dual-chamber pacemaker leads appear unchanged in position. Right IJ approach central venous catheter unchanged in position. Heart size not substantially changed. Perihilar and basilar opacities further increased. Left pleural effusion increased in size. Findings may reflect pulmonary edema, progressed from yesterday's exam.  Left pleural effusion increased in size. Xr Chest Portable    Result Date: 4/12/2019  EXAMINATION: SINGLE XRAY VIEW OF THE CHEST 4/12/2019 5:26 am COMPARISON: November 14, 2015. HISTORY: ORDERING SYSTEM PROVIDED HISTORY: line placement TECHNOLOGIST PROVIDED HISTORY: line placement Ordering Physician Provided Reason for Exam: New right side line placement. Acuity: Acute Type of Exam: Initial Additional signs and symptoms: New right side line placement. FINDINGS: Stable left pectoral trans venous cardiac pacer device. New right IJ central venous catheter with tip near the superior atrial caval junction. Normal lung volume. No new consolidation. Curvilinear radiopacity projecting over the right upper lobe likely represents artifact from a skin fold. No pleural effusion or pneumothorax. Stable cardiomediastinal silhouette and great vessels with redemonstration of atherosclerotic thoracic aorta. New right IJ central venous catheter with tip near the superior atrial caval junction. No pneumothorax. No new consolidation. Thank you for allowing me to participate in the care of your patient. Please feel free to contact me with any questions or concerns.      Mariana Rao MD

## 2019-04-17 NOTE — PROGRESS NOTES
[facility] [address]   OCCUPATIONAL THERAPY MISSED TREATMENT NOTE   INPATIENT   Date: 19  Patient Name: Elo Wanort       Room:   MRN: [id number]   Account #: [de-identified]    : 1950  (77 y.o.)  Gender: female                 REASON FOR MISSED TREATMENT:  Patient unable to participate   -   Pt is sedated and on vent support. Will defer OT evaluation until Pt is appropriate to participate in self-care and functional tasks.       Jnoah Pablo, OT

## 2019-04-18 ENCOUNTER — APPOINTMENT (OUTPATIENT)
Dept: GENERAL RADIOLOGY | Age: 69
DRG: 853 | End: 2019-04-18
Payer: COMMERCIAL

## 2019-04-18 LAB
ALBUMIN SERPL-MCNC: 1.7 G/DL (ref 3.5–5.2)
ALBUMIN SERPL-MCNC: 2.3 G/DL (ref 3.5–5.2)
ALBUMIN/GLOBULIN RATIO: ABNORMAL (ref 1–2.5)
ALBUMIN/GLOBULIN RATIO: ABNORMAL (ref 1–2.5)
ALLEN TEST: ABNORMAL
ALP BLD-CCNC: 80 U/L (ref 35–104)
ALP BLD-CCNC: 97 U/L (ref 35–104)
ALT SERPL-CCNC: 13 U/L (ref 5–33)
ALT SERPL-CCNC: 16 U/L (ref 5–33)
AMYLASE: 259 U/L (ref 28–100)
ANION GAP SERPL CALCULATED.3IONS-SCNC: 7 MMOL/L (ref 9–17)
AST SERPL-CCNC: 24 U/L
AST SERPL-CCNC: 29 U/L
BILIRUB SERPL-MCNC: 0.38 MG/DL (ref 0.3–1.2)
BILIRUB SERPL-MCNC: 0.46 MG/DL (ref 0.3–1.2)
BILIRUBIN DIRECT: 0.2 MG/DL
BILIRUBIN, INDIRECT: 0.26 MG/DL (ref 0–1)
BUN BLDV-MCNC: 12 MG/DL (ref 8–23)
BUN/CREAT BLD: ABNORMAL (ref 9–20)
C-REACTIVE PROTEIN: 21.9 MG/L (ref 0–5)
CALCIUM SERPL-MCNC: 6.9 MG/DL (ref 8.6–10.4)
CARBOXYHEMOGLOBIN: 0.5 % (ref 0–5)
CHLORIDE BLD-SCNC: 100 MMOL/L (ref 98–107)
CO2: 30 MMOL/L (ref 20–31)
CORTISOL COLLECTION INFO: NORMAL
CORTISOL: 6.1 UG/DL (ref 2.7–18.4)
CREAT SERPL-MCNC: <0.4 MG/DL (ref 0.5–0.9)
FIO2: 30
GFR AFRICAN AMERICAN: ABNORMAL ML/MIN
GFR NON-AFRICAN AMERICAN: ABNORMAL ML/MIN
GFR SERPL CREATININE-BSD FRML MDRD: ABNORMAL ML/MIN/{1.73_M2}
GFR SERPL CREATININE-BSD FRML MDRD: ABNORMAL ML/MIN/{1.73_M2}
GLOBULIN: ABNORMAL G/DL (ref 1.5–3.8)
GLUCOSE BLD-MCNC: 111 MG/DL (ref 65–105)
GLUCOSE BLD-MCNC: 112 MG/DL (ref 65–105)
GLUCOSE BLD-MCNC: 114 MG/DL (ref 65–105)
GLUCOSE BLD-MCNC: 115 MG/DL (ref 70–99)
GLUCOSE BLD-MCNC: 120 MG/DL (ref 65–105)
GLUCOSE BLD-MCNC: 97 MG/DL (ref 65–105)
HCO3 ARTERIAL: 34 MMOL/L (ref 22–26)
HCT VFR BLD CALC: 29.5 % (ref 36–46)
HCT VFR BLD CALC: 32.8 % (ref 36–46)
HCT VFR BLD CALC: 34.5 % (ref 36–46)
HEMOGLOBIN: 10.9 G/DL (ref 12–16)
HEMOGLOBIN: 11.2 G/DL (ref 12–16)
HEMOGLOBIN: 9.8 G/DL (ref 12–16)
INR BLD: 1
LIPASE: 52 U/L (ref 13–60)
MAGNESIUM: 1.9 MG/DL (ref 1.6–2.6)
MCH RBC QN AUTO: 29.4 PG (ref 26–34)
MCHC RBC AUTO-ENTMCNC: 32.6 G/DL (ref 31–37)
MCV RBC AUTO: 90.2 FL (ref 80–100)
METHEMOGLOBIN: 0 % (ref 0–1.9)
MODE: ABNORMAL
NEGATIVE BASE EXCESS, ART: ABNORMAL MMOL/L (ref 0–2)
NOTIFICATION TIME: ABNORMAL
NOTIFICATION: ABNORMAL
NRBC AUTOMATED: ABNORMAL PER 100 WBC
O2 DEVICE/FLOW/%: ABNORMAL
O2 SAT, ARTERIAL: 96.1 % (ref 95–98)
OXYHEMOGLOBIN: ABNORMAL % (ref 95–98)
PATIENT TEMP: 37
PCO2 ARTERIAL: 41.5 MMHG (ref 35–45)
PCO2, ART, TEMP ADJ: ABNORMAL (ref 35–45)
PDW BLD-RTO: 14.7 % (ref 11.5–14.9)
PEEP/CPAP: 5
PH ARTERIAL: 7.52 (ref 7.35–7.45)
PH, ART, TEMP ADJ: ABNORMAL (ref 7.35–7.45)
PHOSPHORUS: 2.7 MG/DL (ref 2.6–4.5)
PLATELET # BLD: 171 K/UL (ref 150–450)
PMV BLD AUTO: 8.4 FL (ref 6–12)
PO2 ARTERIAL: 71.8 MMHG (ref 80–100)
PO2, ART, TEMP ADJ: ABNORMAL MMHG (ref 80–100)
POSITIVE BASE EXCESS, ART: 11.2 MMOL/L (ref 0–2)
POTASSIUM SERPL-SCNC: 4.4 MMOL/L (ref 3.7–5.3)
PREALBUMIN: 11.9 MG/DL (ref 20–40)
PROCALCITONIN: 0.1 NG/ML
PROTHROMBIN TIME: 13.3 SEC (ref 11.8–14.6)
PSV: ABNORMAL
PT. POSITION: ABNORMAL
RBC # BLD: 3.83 M/UL (ref 4–5.2)
RESPIRATORY RATE: 24
SAMPLE SITE: ABNORMAL
SET RATE: 16
SODIUM BLD-SCNC: 137 MMOL/L (ref 135–144)
TEXT FOR RESPIRATORY: ABNORMAL
TOTAL HB: ABNORMAL G/DL (ref 12–16)
TOTAL PROTEIN: 4 G/DL (ref 6.4–8.3)
TOTAL PROTEIN: 4.2 G/DL (ref 6.4–8.3)
TOTAL RATE: 24
VT: 400
WBC # BLD: 22.4 K/UL (ref 3.5–11)

## 2019-04-18 PROCEDURE — 99232 SBSQ HOSP IP/OBS MODERATE 35: CPT | Performed by: INTERNAL MEDICINE

## 2019-04-18 PROCEDURE — 6360000002 HC RX W HCPCS: Performed by: INTERNAL MEDICINE

## 2019-04-18 PROCEDURE — 2580000003 HC RX 258: Performed by: INTERNAL MEDICINE

## 2019-04-18 PROCEDURE — 80053 COMPREHEN METABOLIC PANEL: CPT

## 2019-04-18 PROCEDURE — 94762 N-INVAS EAR/PLS OXIMTRY CONT: CPT

## 2019-04-18 PROCEDURE — 36600 WITHDRAWAL OF ARTERIAL BLOOD: CPT

## 2019-04-18 PROCEDURE — 82533 TOTAL CORTISOL: CPT

## 2019-04-18 PROCEDURE — 6370000000 HC RX 637 (ALT 250 FOR IP): Performed by: INTERNAL MEDICINE

## 2019-04-18 PROCEDURE — 82805 BLOOD GASES W/O2 SATURATION: CPT

## 2019-04-18 PROCEDURE — 85027 COMPLETE CBC AUTOMATED: CPT

## 2019-04-18 PROCEDURE — 80202 ASSAY OF VANCOMYCIN: CPT

## 2019-04-18 PROCEDURE — 83735 ASSAY OF MAGNESIUM: CPT

## 2019-04-18 PROCEDURE — 94003 VENT MGMT INPAT SUBQ DAY: CPT

## 2019-04-18 PROCEDURE — C9113 INJ PANTOPRAZOLE SODIUM, VIA: HCPCS | Performed by: INTERNAL MEDICINE

## 2019-04-18 PROCEDURE — 82947 ASSAY GLUCOSE BLOOD QUANT: CPT

## 2019-04-18 PROCEDURE — 6360000002 HC RX W HCPCS: Performed by: STUDENT IN AN ORGANIZED HEALTH CARE EDUCATION/TRAINING PROGRAM

## 2019-04-18 PROCEDURE — 83690 ASSAY OF LIPASE: CPT

## 2019-04-18 PROCEDURE — 71045 X-RAY EXAM CHEST 1 VIEW: CPT

## 2019-04-18 PROCEDURE — 99233 SBSQ HOSP IP/OBS HIGH 50: CPT | Performed by: INTERNAL MEDICINE

## 2019-04-18 PROCEDURE — 84134 ASSAY OF PREALBUMIN: CPT

## 2019-04-18 PROCEDURE — 87040 BLOOD CULTURE FOR BACTERIA: CPT

## 2019-04-18 PROCEDURE — 36415 COLL VENOUS BLD VENIPUNCTURE: CPT

## 2019-04-18 PROCEDURE — P9046 ALBUMIN (HUMAN), 25%, 20 ML: HCPCS | Performed by: STUDENT IN AN ORGANIZED HEALTH CARE EDUCATION/TRAINING PROGRAM

## 2019-04-18 PROCEDURE — 94640 AIRWAY INHALATION TREATMENT: CPT

## 2019-04-18 PROCEDURE — 86140 C-REACTIVE PROTEIN: CPT

## 2019-04-18 PROCEDURE — 85018 HEMOGLOBIN: CPT

## 2019-04-18 PROCEDURE — 84100 ASSAY OF PHOSPHORUS: CPT

## 2019-04-18 PROCEDURE — 85014 HEMATOCRIT: CPT

## 2019-04-18 PROCEDURE — 2500000003 HC RX 250 WO HCPCS: Performed by: STUDENT IN AN ORGANIZED HEALTH CARE EDUCATION/TRAINING PROGRAM

## 2019-04-18 PROCEDURE — 2000000000 HC ICU R&B

## 2019-04-18 PROCEDURE — 82150 ASSAY OF AMYLASE: CPT

## 2019-04-18 PROCEDURE — 84145 PROCALCITONIN (PCT): CPT

## 2019-04-18 PROCEDURE — 2700000000 HC OXYGEN THERAPY PER DAY

## 2019-04-18 PROCEDURE — 85610 PROTHROMBIN TIME: CPT

## 2019-04-18 PROCEDURE — 80076 HEPATIC FUNCTION PANEL: CPT

## 2019-04-18 PROCEDURE — 94770 HC ETCO2 MONITOR DAILY: CPT

## 2019-04-18 RX ORDER — 0.9 % SODIUM CHLORIDE 0.9 %
500 INTRAVENOUS SOLUTION INTRAVENOUS ONCE
Status: DISCONTINUED | OUTPATIENT
Start: 2019-04-18 | End: 2019-04-18

## 2019-04-18 RX ORDER — FLUCONAZOLE 2 MG/ML
800 INJECTION, SOLUTION INTRAVENOUS EVERY 24 HOURS
Status: COMPLETED | OUTPATIENT
Start: 2019-04-18 | End: 2019-04-18

## 2019-04-18 RX ORDER — FLUCONAZOLE 2 MG/ML
400 INJECTION, SOLUTION INTRAVENOUS EVERY 24 HOURS
Status: DISCONTINUED | OUTPATIENT
Start: 2019-04-19 | End: 2019-04-19

## 2019-04-18 RX ORDER — ALBUMIN (HUMAN) 12.5 G/50ML
25 SOLUTION INTRAVENOUS ONCE
Status: COMPLETED | OUTPATIENT
Start: 2019-04-18 | End: 2019-04-18

## 2019-04-18 RX ADMIN — PROPOFOL 35 MCG/KG/MIN: 10 INJECTION, EMULSION INTRAVENOUS at 08:27

## 2019-04-18 RX ADMIN — SODIUM CHLORIDE, POTASSIUM CHLORIDE, SODIUM LACTATE AND CALCIUM CHLORIDE: 600; 310; 30; 20 INJECTION, SOLUTION INTRAVENOUS at 03:29

## 2019-04-18 RX ADMIN — METOCLOPRAMIDE 10 MG: 5 INJECTION, SOLUTION INTRAMUSCULAR; INTRAVENOUS at 20:27

## 2019-04-18 RX ADMIN — Medication 10 ML: at 20:26

## 2019-04-18 RX ADMIN — IPRATROPIUM BROMIDE 0.5 MG: 0.5 SOLUTION RESPIRATORY (INHALATION) at 14:56

## 2019-04-18 RX ADMIN — POLYVINYL ALCOHOL 1 DROP: 14 SOLUTION/ DROPS OPHTHALMIC at 00:10

## 2019-04-18 RX ADMIN — IPRATROPIUM BROMIDE 0.5 MG: 0.5 SOLUTION RESPIRATORY (INHALATION) at 07:17

## 2019-04-18 RX ADMIN — VANCOMYCIN HYDROCHLORIDE 1000 MG: 1 INJECTION, POWDER, LYOPHILIZED, FOR SOLUTION INTRAVENOUS at 01:37

## 2019-04-18 RX ADMIN — IPRATROPIUM BROMIDE 0.5 MG: 0.5 SOLUTION RESPIRATORY (INHALATION) at 23:35

## 2019-04-18 RX ADMIN — VANCOMYCIN HYDROCHLORIDE 1000 MG: 1 INJECTION, POWDER, LYOPHILIZED, FOR SOLUTION INTRAVENOUS at 13:52

## 2019-04-18 RX ADMIN — CHLORHEXIDINE GLUCONATE 0.12% ORAL RINSE 15 ML: 1.2 LIQUID ORAL at 08:28

## 2019-04-18 RX ADMIN — METHYLPREDNISOLONE SODIUM SUCCINATE 40 MG: 40 INJECTION, POWDER, FOR SOLUTION INTRAMUSCULAR; INTRAVENOUS at 17:00

## 2019-04-18 RX ADMIN — LEVALBUTEROL 1.25 MG: 1.25 SOLUTION, CONCENTRATE RESPIRATORY (INHALATION) at 03:47

## 2019-04-18 RX ADMIN — POLYVINYL ALCOHOL 1 DROP: 14 SOLUTION/ DROPS OPHTHALMIC at 04:42

## 2019-04-18 RX ADMIN — SODIUM CHLORIDE, POTASSIUM CHLORIDE, SODIUM LACTATE AND CALCIUM CHLORIDE: 600; 310; 30; 20 INJECTION, SOLUTION INTRAVENOUS at 20:00

## 2019-04-18 RX ADMIN — MINERAL OIL AND WHITE PETROLATUM: 150; 830 OINTMENT OPHTHALMIC at 04:42

## 2019-04-18 RX ADMIN — PANTOPRAZOLE SODIUM 40 MG: 40 INJECTION, POWDER, FOR SOLUTION INTRAVENOUS at 08:28

## 2019-04-18 RX ADMIN — METOCLOPRAMIDE 10 MG: 5 INJECTION, SOLUTION INTRAMUSCULAR; INTRAVENOUS at 08:28

## 2019-04-18 RX ADMIN — FLUCONAZOLE 800 MG: 400 INJECTION, SOLUTION INTRAVENOUS at 10:27

## 2019-04-18 RX ADMIN — METHYLPREDNISOLONE SODIUM SUCCINATE 40 MG: 40 INJECTION, POWDER, FOR SOLUTION INTRAMUSCULAR; INTRAVENOUS at 08:27

## 2019-04-18 RX ADMIN — Medication 10 ML: at 10:33

## 2019-04-18 RX ADMIN — LEVALBUTEROL 1.25 MG: 1.25 SOLUTION, CONCENTRATE RESPIRATORY (INHALATION) at 19:30

## 2019-04-18 RX ADMIN — METHYLPREDNISOLONE SODIUM SUCCINATE 40 MG: 40 INJECTION, POWDER, FOR SOLUTION INTRAMUSCULAR; INTRAVENOUS at 01:40

## 2019-04-18 RX ADMIN — FENTANYL CITRATE 50 MCG: 50 INJECTION INTRAMUSCULAR; INTRAVENOUS at 20:26

## 2019-04-18 RX ADMIN — POLYVINYL ALCOHOL 1 DROP: 14 SOLUTION/ DROPS OPHTHALMIC at 12:37

## 2019-04-18 RX ADMIN — IPRATROPIUM BROMIDE 0.5 MG: 0.5 SOLUTION RESPIRATORY (INHALATION) at 03:47

## 2019-04-18 RX ADMIN — LEVALBUTEROL 1.25 MG: 1.25 SOLUTION, CONCENTRATE RESPIRATORY (INHALATION) at 14:56

## 2019-04-18 RX ADMIN — ENOXAPARIN SODIUM 40 MG: 100 INJECTION SUBCUTANEOUS at 08:28

## 2019-04-18 RX ADMIN — LEVOFLOXACIN 500 MG: 5 INJECTION, SOLUTION INTRAVENOUS at 16:59

## 2019-04-18 RX ADMIN — LEVALBUTEROL 1.25 MG: 1.25 SOLUTION, CONCENTRATE RESPIRATORY (INHALATION) at 10:57

## 2019-04-18 RX ADMIN — IPRATROPIUM BROMIDE 0.5 MG: 0.5 SOLUTION RESPIRATORY (INHALATION) at 10:57

## 2019-04-18 RX ADMIN — POLYVINYL ALCOHOL 1 DROP: 14 SOLUTION/ DROPS OPHTHALMIC at 20:27

## 2019-04-18 RX ADMIN — PROPOFOL 45 MCG/KG/MIN: 10 INJECTION, EMULSION INTRAVENOUS at 03:29

## 2019-04-18 RX ADMIN — MINERAL OIL AND WHITE PETROLATUM: 150; 830 OINTMENT OPHTHALMIC at 00:10

## 2019-04-18 RX ADMIN — PROPOFOL 30 MCG/KG/MIN: 10 INJECTION, EMULSION INTRAVENOUS at 17:54

## 2019-04-18 RX ADMIN — METOCLOPRAMIDE 10 MG: 5 INJECTION, SOLUTION INTRAMUSCULAR; INTRAVENOUS at 17:00

## 2019-04-18 RX ADMIN — LEVALBUTEROL 1.25 MG: 1.25 SOLUTION, CONCENTRATE RESPIRATORY (INHALATION) at 23:35

## 2019-04-18 RX ADMIN — CHLORHEXIDINE GLUCONATE 0.12% ORAL RINSE 15 ML: 1.2 LIQUID ORAL at 20:26

## 2019-04-18 RX ADMIN — FENTANYL CITRATE 50 MCG: 50 INJECTION INTRAMUSCULAR; INTRAVENOUS at 04:50

## 2019-04-18 RX ADMIN — LEVALBUTEROL 1.25 MG: 1.25 SOLUTION, CONCENTRATE RESPIRATORY (INHALATION) at 07:16

## 2019-04-18 RX ADMIN — IPRATROPIUM BROMIDE 0.5 MG: 0.5 SOLUTION RESPIRATORY (INHALATION) at 19:30

## 2019-04-18 RX ADMIN — CALCIUM GLUCONATE: 94 INJECTION, SOLUTION INTRAVENOUS at 18:19

## 2019-04-18 RX ADMIN — ALBUMIN (HUMAN) 25 G: 0.25 INJECTION, SOLUTION INTRAVENOUS at 10:36

## 2019-04-18 ASSESSMENT — PULMONARY FUNCTION TESTS
PIF_VALUE: 7
PIF_VALUE: 10
PIF_VALUE: 9
PIF_VALUE: 7
PIF_VALUE: 13
PIF_VALUE: 7
PIF_VALUE: 5
PIF_VALUE: 7
PIF_VALUE: 12
PIF_VALUE: 6
PIF_VALUE: 10
PIF_VALUE: 9
PIF_VALUE: 12
PIF_VALUE: 7
PIF_VALUE: 14
PIF_VALUE: 7
PIF_VALUE: 9
PIF_VALUE: 12
PIF_VALUE: 7
PIF_VALUE: 8
PIF_VALUE: 11
PIF_VALUE: 11
PIF_VALUE: 7
PIF_VALUE: 11
PIF_VALUE: 9
PIF_VALUE: 9
PIF_VALUE: 6
PIF_VALUE: 5
PIF_VALUE: 7
PIF_VALUE: 7
PIF_VALUE: 9
PIF_VALUE: 6
PIF_VALUE: 7

## 2019-04-18 ASSESSMENT — PAIN SCALES - GENERAL
PAINLEVEL_OUTOF10: 5
PAINLEVEL_OUTOF10: 1
PAINLEVEL_OUTOF10: 0
PAINLEVEL_OUTOF10: 1
PAINLEVEL_OUTOF10: 5
PAINLEVEL_OUTOF10: 0

## 2019-04-18 NOTE — PLAN OF CARE
Problem: Restraint Use - Nonviolent/Non-Self-Destructive Behavior:  Goal: Absence of restraint-related injury  Description  Absence of restraint-related injury  4/18/2019 1811 by Karie Valderrama RN  Note:   Patient reaches for eet tube with turns, restraints maintained to protect airway    4/18/2019 1809 by Karie Valderrama, RN  Outcome: Ongoing

## 2019-04-18 NOTE — PROGRESS NOTES
General Surgery Progress Note            PATIENT NAME: Libby Issa     TODAY'S DATE: 4/18/2019, 11:33 AM    SUBJECTIVE:    Pt  Seen and examined. Off pressors     OBJECTIVE:   VITALS:  BP (!) 93/55   Pulse 91   Temp 98.6 °F (37 °C) (Oral)   Resp 19   Ht 5' (1.524 m)   Wt 134 lb 11.2 oz (61.1 kg)   SpO2 100%   BMI 26.31 kg/m²      INTAKE/OUTPUT:      Intake/Output Summary (Last 24 hours) at 4/18/2019 1133  Last data filed at 4/18/2019 0620  Gross per 24 hour   Intake 6841.59 ml   Output 3425 ml   Net 3416.59 ml                 CONSTITUTIONAL:  Sedated and intubated. No acute distress  HEART:   Regular   LUNGS:   Diminished   ABDOMEN:   Abdomen soft, non-tender, non-distended.   EXTREMITIES:   No edema    Data:  CBC with Differential:    Lab Results   Component Value Date    WBC 22.4 04/18/2019    RBC 3.83 04/18/2019    RBC 3.66 05/23/2012    HGB 11.2 04/18/2019    HCT 34.5 04/18/2019     04/18/2019     05/23/2012    MCV 90.2 04/18/2019    MCH 29.4 04/18/2019    MCHC 32.6 04/18/2019    RDW 14.7 04/18/2019    METASPCT 2 04/11/2019    LYMPHOPCT 4 04/11/2019    MONOPCT 2 04/11/2019    BASOPCT 0 04/11/2019    MONOSABS 0.96 04/11/2019    LYMPHSABS 1.92 04/11/2019    EOSABS 0.00 04/11/2019    BASOSABS 0.00 04/11/2019    DIFFTYPE NOT REPORTED 04/11/2019     BMP:    Lab Results   Component Value Date     04/18/2019    K 4.4 04/18/2019     04/18/2019    CO2 30 04/18/2019    BUN 12 04/18/2019    LABALBU 1.7 04/18/2019    LABALBU 4.6 05/17/2012    CREATININE <0.40 04/18/2019    CALCIUM 6.9 04/18/2019    GFRAA CANNOT BE CALCULATED 04/18/2019    LABGLOM CANNOT BE CALCULATED 04/18/2019    GLUCOSE 115 04/18/2019    GLUCOSE 87 05/21/2012         ASSESSMENT     Principal Problem:    Sepsis due to urinary tract infection ([facility] [address]  Active Problems:    Cystitis    Emphysematous cystitis    Septic shock (HCC)    Leukemoid reaction    Aspiration pneumonia of right lower lobe due to vomit ([address]    MRSA carrier    Urinary retention  Resolved Problems:    * No resolved hospital problems. *  gastric distension, ? gastroparesis    Plan  1. Continue NGT lavage  2. Wean as tolerated  3.  May need SBFT when more stable

## 2019-04-18 NOTE — PROGRESS NOTES
Infectious disease Consult Note      Patient: Chloe Alexander  : 1950  Acct#:  [id number]     Date:  2019    Assessment:         Sepsis due to [facility] Emphysematous cystitis     Aspiration pneumonia of right lower lobe. Septic shock     Yeast growth on sputum culture suspect contamination     Leukemoid reaction     Chololithiases     Acute hypoxic resp failure     MRSA carrier    Urinary retention ,Tran cath in place     Anemia                 Recommendations:   Continue IV Vancomycin and IV Levaquin  Repeat Blood culture ,UA . CRP,Procalcitonin level . Repeat sputum culture. AMYLASE ,LIPASE,LFT   One dose of IV Diflucan was giving  per primary today   Follow CBC ,vancomycin level and renal function closely . Continue supportive care . Subjective:       History of Present Illness  Patient is a 77 y.o.  female admitted with Sepsis due to urinary tract infection   who is seen in consult for the same . She presented w dysuria and lower abd pain for around a week associated with vomiting ,was found hypotensive with leukocytosis . CT suggested emphysematous cystitis,possible gastric outlet obstruction. CXR showed a right lower infiltrate. High CA-19-9,CEA  Allergy to [facility] w SOB   Urine culture  grew ESCHERICHIA COLI resistant to Ampicillin ,sensitive to all others tested ABXS . Blood cultures negative . Vanco trough 4.6 4/15  Mycoplasma IGM 0.97   YEAST MODERATE GROWTH on sputum culture   S/P EGD   with reported esophagitis. Interval history :  She is still intubated ,sedated  . No fever . Right IJ line and Tran cath in place . Levophed  off  . On TPN   WBC 22.4  No BM   CXR today reviewed Increased opacity left upper chest suspicious for atelectasis.  .  Past Medical History:   Diagnosis Date    Abnormal computed tomography of cervical spine     sclerotic bone appearance     CVA (cerebral vascular accident) ([address]     left  side weakness    GERD (gastroesophageal reflux disease)     Hypertension     Paraproteinemia     Weight loss       Past Surgical History:   Procedure Laterality Date    INTUBATION  4/14/2019         PACEMAKER PLACEMENT  07/2011    Pacemaker is Medtronic Revo (compatible). Leads placed in 1995 are NOT MRI compatible. Placed at [facility]. V's per Dr. Mandi Frey can not have an MRI.  UPPER GASTROINTESTINAL ENDOSCOPY N/A 4/16/2019    EGD ESOPHAGOGASTRODUODENOSCOPY @ BEDSIDE  ICU 2002 performed by Kevin Crespo MD at [address]          Admission Meds  No current facility-administered medications on file prior to encounter. Current Outpatient Medications on File Prior to Encounter   Medication Sig Dispense Refill    oxyCODONE-acetaminophen (PERCOCET) 5-325 MG per tablet Take 1 tablet by mouth every 6 hours as needed for Pain.  senna-docusate (PERICOLACE) 8.6-50 MG per tablet Take 1 tablet by mouth 2 times daily      budesonide-formoterol (SYMBICORT) 160-4.5 MCG/ACT AERO Inhale 2 puffs into the lungs 2 times daily      sucralfate (CARAFATE) 1 GM/10ML suspension Take 1 g by mouth 4 times daily       traZODone (DESYREL) 50 MG tablet Take 50 mg by mouth nightly      tiZANidine (ZANAFLEX) 2 MG tablet Take 2 mg by mouth every 8 hours as needed (left knee)       aspirin 81 MG tablet Take 81 mg by mouth daily      clopidogrel (PLAVIX) 75 MG tablet TAKE 1 TABLET DAILY 30 tablet 2    DOCQLACE 100 MG capsule TAKE 1 CAPSULE BY MOUTH IN THE MORNING & IN THE EVENING -DRINK PLENTYOF WATER WHILE TAKING THIS MEDICINE 30 capsule 1    amLODIPine (NORVASC) 10 MG tablet Take 10 mg by mouth daily.  LORazepam (ATIVAN) 0.5 MG tablet Take 0.5 mg by mouth every 6 hours as needed for Anxiety.  therapeutic multivitamin-minerals (THERAGRAN-M) tablet Take 1 tablet by mouth daily.  omeprazole (PRILOSEC) 20 MG capsule Take 20 mg by mouth daily.  venlafaxine (EFFEXOR) 37.5 MG tablet Take 37.5 mg by mouth 3 times daily.  Misc.  [employer] [facility] [address] [place] wheelchair with left fupper extremity support  Dx: stroke with left hemiparesis. 1 each 0           Allergies  Allergies   Allergen Reactions    Penicillins Shortness Of Breath and Rash    Amoxicillin         Social   Social History     Tobacco Use    Smoking status: Current Some Day Smoker     Packs/day: 1.00     Years: 30.00     Pack years: 30.00    Smokeless tobacco: Never Used   Substance Use Topics    Alcohol use: Not Currently     Alcohol/week: 16.8 oz     Types: 28 Glasses of wine per week                History reviewed. No pertinent family history. Review of Systems  Intubated unable to obtain     Tolerating antibiotics. Physical Exam  BP (!) 113/46   Pulse 97   Temp 98.6 °F (37 °C) (Oral)   Resp 26   Ht 5' (1.524 m)   Wt 134 lb 11.2 oz (61.1 kg)   SpO2 99%   BMI 26.31 kg/m²           General Appearance: intubated ,sedated . Skin: warm and dry, no rash or erythema  Head: normocephalic and atraumatic  Eyes: pupils equal, round  Neck: neck supple and non tender   Pulmonary/Chest: coarse to auscultation bilaterally- no wheezes, rales or rhonchi  Cardiovascular: normal rate, regular rhythm, normal S1 and S2, no murmurs. Abdomen: soft, -distended, normal bowel sounds, no masses or organomegaly  Extremities: no cyanosis, clubbing   Edema   Musculoskeletal: left knee cast   Right IJ line   Tran in place    Data Review:    Recent Labs     04/16/19 0407 04/17/19  0435  04/17/19  2013 04/18/19  0006 04/18/19  0451   WBC 18.6*  --  17.0*  --   --   --  22.4*   HGB 7.2*   < > 11.2*   < > 10.8* 10.9* 11.2*   HCT 21.2*   < > 32.5*   < > 32.7* 32.8* 34.5*   MCV 85.9  --  88.8  --   --   --  90.2     --  157  --   --   --  171    < > = values in this interval not displayed.      Recent Labs     04/16/19  0407 04/17/19  0435 04/17/19  1238 04/18/19  0451    138  --  137   K 2.9* 3.0* 4.1 4.4    100  --  100   CO2 25 29  --  30   PHOS 0.7* 1.4* 2.0* 2.7 BUN 8 8  --  12   CREATININE <0.40* <0.40*  --  <0.40*     Recent Labs     04/16/19  0407 04/17/19  0435 04/18/19  0451   AST 36* 33* 29   ALT 17 17 16   BILITOT 0.44 0.53 0.38   ALKPHOS 99 96 97     No results for input(s): LIPASE, AMYLASE in the last 72 hours. Recent Labs     04/16/19 0407 04/17/19  0435 04/18/19  0451   PROTIME 13.5 13.6 13.3   INR 1.0 1.0 1.0       Imaging Studies:                           All appropriate imaging studies and reports reviewed: Yes       Ct Abdomen Pelvis Wo Contrast Additional Contrast? Oral    Result Date: 4/14/2019  EXAMINATION: CT OF THE ABDOMEN AND PELVIS WITHOUT CONTRAST 4/14/2019 7:34 pm TECHNIQUE: CT of the abdomen and pelvis was performed without the administration of intravenous contrast. Multiplanar reformatted images are provided for review. Dose modulation, iterative reconstruction, and/or weight based adjustment of the mA/kV was utilized to reduce the radiation dose to as low as reasonably achievable. COMPARISON: 04/11/2019 HISTORY: ORDERING SYSTEM PROVIDED HISTORY: ABDOMINAL PAIN TECHNOLOGIST PROVIDED HISTORY: Water soluble contrast only please Ordering Physician Provided Reason for Exam: Abdominal pain - Vented patient. Contrast given via nurse through NG tube. Acuity: Unknown Type of Exam: Unknown Relevant Medical/Surgical History: Hx - Sepsis due to urinary tract infection. FINDINGS: Lower Chest: New moderate layering bilateral pleural effusions with bilateral lower lobe atelectasis. Organs: Limited evaluation due lack of intravenous contrast.  Cholelithiasis redemonstrated. No gallbladder wall thickening or biliary ductal dilatation. Scattered tiny hypodense lesions in the liver are too small to characterize but statistically represent benign cysts or hemangiomas and appear unchanged. The pancreas, spleen, adrenal glands, and kidneys are unremarkable. There is no hydronephrosis or urinary tract calculus. GI/Bowel: The stomach is distended.   Enteric tube is in place. No contrast is seen distal to the pylorus and there is contrast reflux into the distal esophagus. There is no evidence of bowel obstruction. The appendix is not definitely visualized. No focal pericecal inflammatory changes are evident. Pelvis: The urinary bladder is decompressed by Tran catheter. No pelvic mass is seen. Peritoneum/Retroperitoneum: Small amount of free fluid in the pelvic cavity. No free air or focal fluid collection. No abnormal lymph node. Normal abdominal aortic caliber. Moderate calcific atherosclerosis. Bones/Soft Tissues: No acute osseous abnormality. Diffuse anasarca. Moderate degenerative changes in the lumbar spine. 1. Distended, contrast filled stomach with reflux of contrast into the esophagus. No enteric contrast is seen distal to the pylorus suggesting delayed gastric emptying in the setting of ileus. 2. New moderate layering bilateral pleural effusions with bilateral lower lobe atelectasis. 3. Small amount of nonspecific free fluid in the pelvic cavity. 4. Anasarca. 5. Cholelithiasis. Xr Chest (single View Frontal)    Result Date: 4/13/2019  EXAMINATION: SINGLE XRAY VIEW OF THE CHEST 4/13/2019 7:18 am COMPARISON: April 12, 2019 HISTORY: ORDERING SYSTEM PROVIDED HISTORY: dyspnea TECHNOLOGIST PROVIDED HISTORY: dyspnea Ordering Physician Provided Reason for Exam: dyspnea Acuity: Acute Type of Exam: Subsequent/Follow-up Additional signs and symptoms: dyspnea FINDINGS: A right IJ catheter is seen with its tip terminating at the superior cavoatrial junction. The left chest wall pacemaker and leads are stable. The cardiomediastinal silhouette is stable. There is interval increased opacity at the right lung base, may be related to atelectasis versus pneumonia. There is small atelectasis and mild pleural effusion at the left lung base. There is no pneumothorax. There is no acute osseous abnormality.      Interval increased opacity at the right lung base, may be related to atelectasis versus pneumonia. Small atelectasis and mild pleural effusion at the left lung, new since the prior study. Xr Femur Left (min 2 Views)    Result Date: 3/27/2019  EXAMINATION: 4 XRAY VIEWS OF THE LEFT FEMUR; 2 XRAY VIEWS OF THE LEFT KNEE; 3 XRAY VIEWS OF THE LEFT TIBIA AND FIBULA 3/27/2019 7:00 pm COMPARISON: Left hip 11/14/2015. HISTORY: ORDERING SYSTEM PROVIDED HISTORY: pain TECHNOLOGIST PROVIDED HISTORY: pain Ordering Physician Provided Reason for Exam: pt twisted wrong, lt knee pain, unable to straighten knee. Acuity: Acute Type of Exam: Initial FINDINGS: Left femur, four views: The bones are diffusely osteopenic. No acute fracture deformity. The hip joint is maintained. The femoral head projects within the acetabulum. No focal soft tissue abnormality is seen. Left knee, two views: The knee is flexed. No acute osseous abnormality. No joint effusion. Joint spaces are not optimally profiled but no significant arthritic changes are apparent. Left tib-fib, three views: There is evidence of a remote healed distal tibia fracture. No acute fracture or dislocation is seen. No focal soft tissue abnormality. No acute osseous abnormality of the left femur. No acute osseous abnormality of the left knee. No acute osseous abnormality of the left tib-fib. Healed remote distal tibia fracture. Marked osteopenia, likely due to disuse. Xr Knee Left (1-2 Views)    Result Date: 3/27/2019  EXAMINATION: 4 XRAY VIEWS OF THE LEFT FEMUR; 2 XRAY VIEWS OF THE LEFT KNEE; 3 XRAY VIEWS OF THE LEFT TIBIA AND FIBULA 3/27/2019 7:00 pm COMPARISON: Left hip 11/14/2015. HISTORY: ORDERING SYSTEM PROVIDED HISTORY: pain TECHNOLOGIST PROVIDED HISTORY: pain Ordering Physician Provided Reason for Exam: pt twisted wrong, lt knee pain, unable to straighten knee. Acuity: Acute Type of Exam: Initial FINDINGS: Left femur, four views: The bones are diffusely osteopenic. No acute fracture deformity.   The hip joint is maintained. The femoral head projects within the acetabulum. No focal soft tissue abnormality is seen. Left knee, two views: The knee is flexed. No acute osseous abnormality. No joint effusion. Joint spaces are not optimally profiled but no significant arthritic changes are apparent. Left tib-fib, three views: There is evidence of a remote healed distal tibia fracture. No acute fracture or dislocation is seen. No focal soft tissue abnormality. No acute osseous abnormality of the left femur. No acute osseous abnormality of the left knee. No acute osseous abnormality of the left tib-fib. Healed remote distal tibia fracture. Marked osteopenia, likely due to disuse. Xr Knee Left (3 Views)    Result Date: 3/30/2019  EXAMINATION: 3 XRAY VIEWS OF THE LEFT KNEE 3/30/2019 10:58 am COMPARISON: March 27, 2018 HISTORY: ORDERING SYSTEM PROVIDED HISTORY: F/u L knee pain after cast TECHNOLOGIST PROVIDED HISTORY: AP, oblique, lateral F/u L knee pain after cast FINDINGS: Marked osteopenia. Interval casting, which degrades fine osseous detail question cortical offset in the lateral femoral metaphysis. No malalignment identified. No significant joint effusion. Interval casting. Question nondisplaced fracture in the lateral femoral metaphysis. Osteopenia. Xr Tibia Fibula Left (2 Views)    Result Date: 3/27/2019  EXAMINATION: 4 XRAY VIEWS OF THE LEFT FEMUR; 2 XRAY VIEWS OF THE LEFT KNEE; 3 XRAY VIEWS OF THE LEFT TIBIA AND FIBULA 3/27/2019 7:00 pm COMPARISON: Left hip 11/14/2015. HISTORY: ORDERING SYSTEM PROVIDED HISTORY: pain TECHNOLOGIST PROVIDED HISTORY: pain Ordering Physician Provided Reason for Exam: pt twisted wrong, lt knee pain, unable to straighten knee. Acuity: Acute Type of Exam: Initial FINDINGS: Left femur, four views: The bones are diffusely osteopenic. No acute fracture deformity. The hip joint is maintained. The femoral head projects within the acetabulum.   No focal soft tissue abnormality is seen. Left knee, two views: The knee is flexed. No acute osseous abnormality. No joint effusion. Joint spaces are not optimally profiled but no significant arthritic changes are apparent. Left tib-fib, three views: There is evidence of a remote healed distal tibia fracture. No acute fracture or dislocation is seen. No focal soft tissue abnormality. No acute osseous abnormality of the left femur. No acute osseous abnormality of the left knee. No acute osseous abnormality of the left tib-fib. Healed remote distal tibia fracture. Marked osteopenia, likely due to disuse. Xr Abdomen (kub) (single Ap View)    Result Date: 4/14/2019  EXAMINATION: SINGLE SUPINE XRAY VIEW(S) OF THE ABDOMEN 4/14/2019 7:39 am COMPARISON: CT abdomen and pelvis  film from 11 April 2019 HISTORY: [address]: Abd Distention TECHNOLOGIST PROVIDED HISTORY: Abd Distention Ordering Physician Provided Reason for Exam: Abdominal distention. Pt was moving around in the bed. Best films at present time. Acuity: Acute Type of Exam: Initial Additional signs and symptoms: Abdominal distention. Pt was moving around in the bed. Best films at present time. FINDINGS: Portable view time stamped at 748 hours demonstrates an intestinal tube terminating in the midportion of a gaseous Spike dilated stomach. Densities are present over the stomach likely medication. Bipolar pacemaker is in situ with intact leads. Heart size is top-normal, stable. Gaseous distension of the stomach and loop of bowel in the upper mid abdomen is noted but there is gas and fecal material in the rectum. Gastric outlet obstruction or proximal small bowel partial obstruction is suspected. No free air is noted. Midline city is present over the pelvis likely a monitor or CT small bore catheter. Vascular calcification is present in the pelvis.      Persistent preferential gaseous distension of the stomach although there is some gas in the upper abdomen and gas and fecal material present in the rectosigmoid. Findings suggest gastric outlet obstruction. Ct Abdomen Pelvis W Iv Contrast    Result Date: 4/11/2019  EXAMINATION: CT OF THE ABDOMEN AND PELVIS WITH CONTRAST 4/11/2019 5:14 pm TECHNIQUE: CT of the abdomen and pelvis was performed with the administration of intravenous contrast. Multiplanar reformatted images are provided for review. Dose modulation, iterative reconstruction, and/or weight based adjustment of the mA/kV was utilized to reduce the radiation dose to as low as reasonably achievable. COMPARISON: None. HISTORY: ORDERING SYSTEM PROVIDED HISTORY: Abdominal pain TECHNOLOGIST PROVIDED HISTORY: IV Only Contrast Ordering Physician Provided Reason for Exam: patient c/o abd pain for an hour FINDINGS: Lower Chest: Trace pleural fluid bilaterally is noted. Indwelling cardiac pacemaker is present. Heart size is normal.  Coronary artery calcifications are evident. The esophagus is significantly dilated and fluid-filled. No paraesophageal adenopathy is evident. Organs: The spleen, pancreas, and adrenals are unremarkable. The liver contains hypodensities, likely cysts. The gallbladder is distended and contains a solitary gallstone. The kidneys excrete contrast bilaterally. Extrarenal collecting systems are noted. The ureters are mildly dilated down to the urinary bladder without evidence of intraluminal or ureteral obstructing calculi. GI/Bowel: Marked distention of the stomach is noted; this continues to the pylorus with appearance suggesting partial gastric outlet obstruction. Fluid is present in some small bowel loops and colon distal to the stomach. No small bowel obstruction. Pelvis: Marked distention of the urinary bladder is noted. 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 Distended ureters may be related to reflux or infection.   No free pelvic fluid, pelvic or inguinal adenopathy is noted. Peritoneum/Retroperitoneum: No aortic aneurysm. Mild to moderate plaque is noted in the infrarenal abdominal aorta and both proximal iliac arteries. Shotty lymph nodes are present around the aorta in the upper abdomen. No mesenteric adenopathy is noted. Bones/Soft Tissues: Degenerative changes are present in the hips and lower lumbar facets. Estimated biologic radiation dose for this procedure:258.77 mGy/cm2.     1. Dilatation of the thoracic esophagus filled with fluid. No obstructive process is noted. No adjacent enlarged lymph nodes. 2. Trace pleural fluid. 3. Marked distention of the stomach. This appears to extend to the pylorus. Partial gastric outlet obstruction is not excluded. 4. Bilateral dilated ureters without obstructing calculi. Urinary bladder is markedly distended with bladder wall air suggesting emphysematous cystitis. Dilatation of the ureters may be related to reflux or infection. 5. Atherosclerotic disease. 6. Other findings as above. Critical results were called by Dr. Akua Trujillo MD to [address] on 4/11/2019 at 17:37. Xr Chest Portable    Result Date: 4/16/2019  EXAMINATION: SINGLE XRAY VIEW OF THE CHEST 4/16/2019 6:54 am COMPARISON: 04/15/2019, 610 hours HISTORY: ORDERING SYSTEM PROVIDED HISTORY: ETT placement TECHNOLOGIST PROVIDED HISTORY: ETT placement Ordering Physician Provided Reason for Exam: on vent Acuity: Acute Type of Exam: Initial 64-year-old female on ventilator; check endotracheal tube placement FINDINGS: Portable AP upright view of the chest. Endotracheal tube distal tip overlying the mid trachea approximately 4.1 cm above the level of the ron. Enteric tube traverses the GE junction with distal tip excluded from the field of view. Left subclavian approach cardiac pacemaker device distal lead tips relatively stable in position. Right internal jugular approach central venous catheter distal tip overlying the high right atrium, stable.  Cardiac monitor leads overlie the chest. Atherosclerotic calcification of the thoracic aorta. Slight stable volume loss of the left hemithorax. No pneumothorax. No free air. Dense retrocardiac/left basilar airspace consolidation and small left-sided pleural effusion. Stable mild focal opacity at the right mid lung zone. Underlying COPD. Stable mild pulmonary vascular congestion and left-sided predominant parahilar opacity. Visualized osseous structures remain unchanged. 1. Stable multifocal airspace disease as detailed above with dense retrocardiac/left basilar airspace consolidation and small left-sided pleural effusion. Mild pulmonary vascular congestion. Findings may represent edema or multifocal pneumonia. 2. Underlying COPD. 3. Tubes and line as detailed above. Xr Chest Portable    Result Date: 4/15/2019  EXAMINATION: SINGLE XRAY VIEW OF THE CHEST 4/15/2019 6:47 am COMPARISON: 14 April 2019 HISTORY: ORDERING SYSTEM PROVIDED HISTORY: ETT placement TECHNOLOGIST PROVIDED HISTORY: ETT placement Ordering Physician Provided Reason for Exam: on vent Acuity: Acute Type of Exam: Initial FINDINGS: AP portable view of the chest time stamped at 612 hours demonstrates overlying cardiac monitoring electrodes. Endotracheal tube terminates 4 cm above the ron. Bipolar pacemaker enters from the left with intact leads in appropriate positions. Intestinal tube extends beyond the fundus of the stomach, tip not included. Right internal jugular catheter terminates at the cavoatrial junction. Heart size is normal.  Aortic arch is calcified. There is interval improvement in vascular congestion with resolution of perihilar opacities. Some bibasilar opacities remain. No extrapleural air is noted. Osseous structures are stable. Interval improvement in vascular congestion and bilateral opacities consistent with resolving pulmonary edema. Tubes and lines as above.      Xr Chest Portable    Result Date: 4/14/2019  EXAMINATION:

## 2019-04-18 NOTE — PLAN OF CARE
Problem: Risk for Impaired Skin Integrity  Goal: Tissue integrity - skin and mucous membranes  Description  Structural intactness and normal physiological function of skin and  mucous membranes. 4/18/2019 0346 by Renea Barraza RN  Outcome: Ongoing  Note:   Pt turned every 2 hours or sooner as needed. Free from redness or breakdown this shift. Problem: Falls - Risk of:  Goal: Will remain free from falls  Description  Will remain free from falls  4/18/2019 0346 by Renea Barraza RN  Outcome: Ongoing  Note:   Pt still intubated and remained on vent, not out of bed. Free from falls. Problem: Infection, Septic Shock:  Goal: Will show no infection signs and symptoms  Description  Will show no infection signs and symptoms  4/18/2019 0346 by Renea Barraza RN  Outcome: Ongoing  Note:   Pt still afebrile this shift, showing no worsening signs of infection. Problem: Pain:  Goal: Pain level will decrease  Description  Pain level will decrease  4/18/2019 0346 by Renea Barraza RN  Outcome: Ongoing  Note:   Pain controlled using CPOT scale, with PRN fentanyl as needed. Problem: Nutrition  Goal: Optimal nutrition therapy  4/18/2019 0346 by Renea Barraza RN  Outcome: Ongoing  Note:   Pt still has TPN running at goal of 65. TF not indicated this shift. Nutrition adequate for pt. Problem: Restraint Use - Nonviolent/Non-Self-Destructive Behavior:  Goal: Absence of restraint indications  Description  Absence of restraint indications  4/18/2019 0346 by Renea Barraza RN  Outcome: Ongoing  Note:   Pt still on ventilator with sedation. Attempts to pull at lines and tubes with ROM. Still indicated this shift.       Problem: Restraint Use - Nonviolent/Non-Self-Destructive Behavior:  Goal: Absence of restraint-related injury  Description  Absence of restraint-related injury  4/18/2019 0346 by Renea Barraza RN  Outcome: Ongoing  Note:   Restraints released every 2 hours or sooner as needed

## 2019-04-18 NOTE — PROGRESS NOTES
ICU Progress Note (Vent)   [facility]    Patient - Aki Nava,  Age - 77 y.o.    - 1950      Room Number -    MRN -  [id number]   Acct # - [de-identified]  Date of Admission -  2019  2:48 PM    Events of Past 24 Hours   Sputum found to have budding yeast; candida species. Pt now on diflucan. Off pressors. Alert and following simple commands. Vitals    height is 5' (1.524 m) and weight is 134 lb 11.2 oz (61.1 kg). Her oral temperature is 98.6 °F (37 °C). Her blood pressure is 113/46 (abnormal) and her pulse is 97. Her respiration is 22 and oxygen saturation is 100%. Temperature Range: Temp: 98.6 °F (37 °C) Temp  Av.4 °F (36.9 °C)  Min: 97.9 °F (36.6 °C)  Max: 98.8 °F (37.1 °C)  BP Range:  Systolic (91IPU), WOI:903 , Min:59 , ZCO:125     Diastolic (04VBE), YHL:89, Min:34, Max:85    Pulse Range: Pulse  Av.1  Min: 85  Max: 114  Respiration Range: Resp  Av.7  Min: 17  Max: 32  Current Pulse Ox[de-identified]  SpO2: 100 %  24HR Pulse Ox Range:  SpO2  Av %  Min: 95 %  Max: 100 %  Oxygen Amount and Delivery: O2 Flow Rate (L/min): 15 L/min      Wt Readings from Last 3 Encounters:   19 134 lb 11.2 oz (61.1 kg)   19 89 lb (40.4 kg)   19 94 lb 1.6 oz (42.7 kg)     I/O       Intake/Output Summary (Last 24 hours) at 2019 1352  Last data filed at 2019 0620  Gross per 24 hour   Intake 6841.59 ml   Output 3250 ml   Net 3591.59 ml     I/O last 3 completed shifts: In: 6841.6 [I.V.:4009.5; NG/GT:1200; IV Piggyback:850]  Out:  [Urine:2725; Emesis/NG output:600;  Other:100]     DRAIN/TUBE OUTPUT:     Invasive Lines   ETT Day -   4  NG tube  Lines -  Right IJ day 5    ICP PRESSURE RANGE:  No data recorded  CVP PRESSURE RANGE:  No data recorded  Mechanical Ventilation Data   SETTINGS (Comprehensive)  Vent Information  $Ventilation: $Subsequent Day  Ventilator Started: Yes  Ventilation Day(s): 5  Equipment ID: [id number]  Equipment Changed: HME  Vent Type: Servo i  Vent Mode: PRVC  Vt Ordered: 400 mL  Rate Set: 18 bmp  FiO2 : 30 %  Sensitivity: 5  PEEP/CPAP: 5  I Time/ I Time %: 0.9 s  Cuff Pressure (cm H2O): 22 cm H2O  Humidification Source: HME  Additional Respiratory  Assessments  Pulse: 97  Resp: 22  SpO2: 100 %  End Tidal CO2: 41 (%)  Position: Semi-Diaz's  Humidification Source: HME  Oral Care Completed?: Yes  Oral Care: Mouth suctioned  Subglottic Suction Done?: Yes  Cuff Pressure (cm H2O): 22 cm H2O       ABGs:   Lab Results   Component Value Date    PHART 7.522 04/18/2019    PO2ART 71.8 04/18/2019    VZB0MXW 41.5 04/18/2019       Lab Results   Component Value Date    MODE PRVC 04/18/2019         Medications   IV   PN-Adult 2-in-1 Central Line (Standard) 65 mL/hr at 04/17/19 1819    propofol 35 mcg/kg/min (04/18/19 0827)    vasopressin (Septic Shock) infusion Stopped (04/14/19 1930)    norepinephrine Stopped (04/16/19 2102)    lactated ringers 125 mL/hr at 04/18/19 0329    dextrose        [START ON 4/19/2019] fluconazole  400 mg Intravenous Q24H    magnesium sulfate  1 g Intravenous Once    metoclopramide  10 mg Intravenous TID    pantoprazole  40 mg Intravenous Daily    And    sodium chloride (PF)  10 mL Intravenous Daily    methylPREDNISolone  40 mg Intravenous Q8H    sodium chloride  250 mL Intravenous Once    vancomycin  1,000 mg Intravenous Q12H    polyvinyl alcohol  1 drop Both Eyes Q4H    And    lubrifresh P.M.    Both Eyes Q4H    chlorhexidine  15 mL Mouth/Throat BID    ipratropium  0.5 mg Nebulization Q4H    insulin lispro  0-12 Units Subcutaneous Q6H    levalbuterol  1.25 mg Nebulization Q4H    docusate sodium  100 mg Oral TID    vancomycin (VANCOCIN) intermittent dosing (placeholder)   Other RX Placeholder    levofloxacin  500 mg Intravenous Q24H    venlafaxine  37.5 mg Oral TID    clopidogrel  75 mg Oral Daily    aspirin  81 mg Oral Daily    sodium chloride flush  10 mL Intravenous 2 times per day    enoxaparin  40 mg Subcutaneous Daily       Diet/Nutrition   PN-Adult 2-in-1 Central Line (Standard)    Exam   VITALS    height is 5' (1.524 m) and weight is 134 lb 11.2 oz (61.1 kg). Her oral temperature is 98.6 °F (37 °C). Her blood pressure is 113/46 (abnormal) and her pulse is 97. Her respiration is 22 and oxygen saturation is 100%. Ventilator Settings (Basic)  Vent Mode: PRVC Rate Set: 18 bmp/Vt Ordered: 400 mL/ /FiO2 : 30 %    Constitutional - On sedation but awake and follows simple commands  General Appearance  well developed, well nourished  HEENT - NG tube in place. Life support devices in place (ET, OG),normocephalic, atraumatic. Lungs - ET tube secured in place. Breathing over the vent. Chest expands equally, no wheezes, rales or rhonchi. Cardiovascular - . Heart sounds are normal.  normal rate and rhythm regular, no murmur, gallop or rub.   Abdomen - soft, nontender, nondistended, no masses   Skin - no bruising or bleeding  Extremities - no cyanosis, clubbing or edema    Lab Results   CBC     Lab Results   Component Value Date    WBC 22.4 04/18/2019    RBC 3.83 04/18/2019    RBC 3.66 05/23/2012    HGB 11.2 04/18/2019    HCT 34.5 04/18/2019     04/18/2019     05/23/2012    MCV 90.2 04/18/2019    MCH 29.4 04/18/2019    MCHC 32.6 04/18/2019    RDW 14.7 04/18/2019    METASPCT 2 04/11/2019    LYMPHOPCT 4 04/11/2019    MONOPCT 2 04/11/2019    BASOPCT 0 04/11/2019    MONOSABS 0.96 04/11/2019    LYMPHSABS 1.92 04/11/2019    EOSABS 0.00 04/11/2019    BASOSABS 0.00 04/11/2019    DIFFTYPE NOT REPORTED 04/11/2019       BMP   Lab Results   Component Value Date     04/18/2019    K 4.4 04/18/2019     04/18/2019    CO2 30 04/18/2019    BUN 12 04/18/2019    CREATININE <0.40 04/18/2019    GLUCOSE 115 04/18/2019    GLUCOSE 87 05/21/2012    CALCIUM 6.9 04/18/2019       LFTS  Lab Results   Component Value Date    ALKPHOS 97 04/18/2019    ALT 16 04/18/2019    AST with hypoxia  Sepsis  ESBL UTI  Anemia   COPD    On levaquin, vanco, diflucan  Vent support  Adjust vent for alkalosis  Follow BP    Hopefully wean soon    CC time 35 min    Electronically signed by Ian Egan MD on 4/18/2019 at 3:50 PM

## 2019-04-18 NOTE — PROGRESS NOTES
Problems:    Cystitis    Emphysematous cystitis    Septic shock (HCC)    Leukemoid reaction    Aspiration pneumonia of right lower lobe due to vomit [facility])    MRSA carrier    Urinary retention  Resolved Problems:    * No resolved hospital problems. *      The conditions that I am treating are Stable and show no change    PLAN :  1. Will add small dose of Reglan for a few days. 2. Repeat abdominal x-rays in couple of days. Thank you for allowing me to participate in the care of your patient. Please feel free to contact me with any concerns. [phone]    Martín Gonzalez MD    Note is dictated utilizing voice recognition software. Unfortunately this leads to occasional typographical errors. Please contact our office if you have any questions.

## 2019-04-18 NOTE — PROGRESS NOTES
Nutrition Assessment (Parenteral Nutrition)    Type and Reason for Visit: Reassess    Nutrition Recommendations: TPN to continue with the following changes to additives: K Acetate 40 to 10 mEq, KCl 50 to 60 mEq, insulin 22 to 8 units, remove MVI and trace elements. Nutrition Assessment: Pt stable from a nutritional standpoint. Pt remains at nutrition risk due to dependence on nutrition support. TPN to continue at 65 mL per hr. Reglan started by physician. Malnutrition Assessment:  · Malnutrition Status: At risk for malnutrition  · Context: Acute illness or injury  · Findings of the 6 clinical characteristics of malnutrition (Minimum of 2 out of 6 clinical characteristics is required to make the diagnosis of moderate or severe Protein Calorie Malnutrition based on AND/ASPEN Guidelines):  1. Energy Intake-Less than or equal to 75% of estimated energy requirement(Previously; improving with parenteral nutrition), Greater than or equal to 5 days    2. Weight Loss-Unable to assess(edema present),    3. Fat Loss-Unable to assess,    4. Muscle Loss-Unable to assess,    5. Fluid Accumulation-Moderate to severe fluid accumulation, Extremities  6.  Strength-Not measured    Nutrition Risk Level: High    Nutrient Needs:  · Estimated Daily Total Kcal: 9272-1389 based on wt of 25-27 per kg using wt of 57.2 kg  · Estimated Daily Protein (g): 63-68 based on 1.4-1.5 gm/kg IBW(revised)    Nutrition Diagnosis:   · Problem: Inadequate oral intake  · Etiology: related to Alteration in GI function, Insufficient energy/nutrient consumption     Signs and symptoms:  as evidenced by NPO status due to medical condition, Nutrition support - PN    Objective Information:  · Nutrition-Focused Physical Findings: Edema: +2 pitting RUE, LUE, RLE, LLE.    · Wound Type: Stage I, Pressure Ulcer(Stage pressure ulcer on buttocks; thigh wound)  · Current Nutrition Therapies:  · Oral Diet Orders: NPO   · Parenteral Nutrition Orders:  · Type and Formula: 2-in-1 Custom, Premix Central   · Lipids: None(On Propofol & TG elevated)  · Rate/Volume: 65 ml/ 1560 ml daily  · Duration: Continuous  · Current PN Order Provides: 1373 kcal, 78 gm pro  · Goal PN Orders Provides: 7533-2892 kcal; 63-68 gm pro  · Additional Calories: Propofol @ 10.3 ml/hr = 272 kcal  · Anthropometric Measures:  · Ht: 5' (152.4 cm)   · Current Body Wt: 134 lb 11.2 oz (61.1 kg)(edema noted)  · Admission Body Wt: 102 lb (46.3 kg)  · Ideal Body Wt: 100 lb (45.4 kg), % Ideal Body 102% based on admission wt  · BMI Classification: BMI 18.5 - 24.9 Normal Weight(based on admission wt)    Nutrition Interventions:   Continue NPO, Modify current Parenteral Nutrition  Continued Inpatient Monitoring    Nutrition Evaluation:   · Evaluation: Progressing toward goals   · Goals: PN to meet greater 90% of estimated nutrition needs   · Monitoring: Nutrition Progression, PN Intake, PN Tolerance, Wound Healing, I&O, Weight, Pertinent Labs, Monitor Bowel Function    Almas Capellan R.D., L.JOSE.   Phone: [phone]

## 2019-04-18 NOTE — FLOWSHEET NOTE
Dr Aileen Ruffin updated with order for urine specimen from new urinary catheter. New order received to change urinary catheter at 5am and if unsuccessful to call urology at that time.

## 2019-04-18 NOTE — PLAN OF CARE
Problem: Falls - Risk of:  Goal: Will remain free from falls  Description  Will remain free from falls  Outcome: Met This Shift  Goal: Absence of physical injury  Description  Absence of physical injury  Outcome: Met This Shift     Problem: Risk for Impaired Skin Integrity  Goal: Tissue integrity - skin and mucous membranes  Description  Structural intactness and normal physiological function of skin and  mucous membranes.   Outcome: Ongoing     Problem: Infection, Septic Shock:  Goal: Will show no infection signs and symptoms  Description  Will show no infection signs and symptoms  Outcome: Ongoing     Problem: Tissue Perfusion, Altered:  Goal: Circulatory function within specified parameters  Description  Circulatory function within specified parameters  Outcome: Ongoing     Problem: Pain:  Goal: Pain level will decrease  Description  Pain level will decrease  Outcome: Ongoing  Goal: Control of acute pain  Description  Control of acute pain  Outcome: Ongoing  Goal: Control of chronic pain  Description  Control of chronic pain  Outcome: Ongoing     Problem: Nutrition  Goal: Optimal nutrition therapy  Description       4/18/2019 1809 by Luba Heller RN  Outcome: Ongoing  4/18/2019 1726 by Julius Melgoza RD, LD  Outcome: Ongoing     Problem: Restraint Use - Nonviolent/Non-Self-Destructive Behavior:  Goal: Absence of restraint indications  Description  Absence of restraint indications  Outcome: Ongoing  Goal: Absence of restraint-related injury  Description  Absence of restraint-related injury  Outcome: Ongoing

## 2019-04-18 NOTE — FLOWSHEET NOTE
Patient's eye were open during prayer; nodded during prayer;     04/18/19 1225   Encounter Summary   Services provided to: Patient   Referral/Consult From: Rounding   Continue Visiting   (4/18/19)   Complexity of Encounter Moderate   Length of Encounter 15 minutes   Spiritual Assessment Completed Yes   Routine   Type Follow up   Spiritual/Denominational   Type Spiritual support   Assessment Approachable   Intervention Nurtured hope;Prayer;Sustaining presence/ Ministry of presence   Outcome Receptive

## 2019-04-18 NOTE — PROGRESS NOTES
[address] [facility]    OCCUPATIONAL THERAPY MISSED TREATMENT NOTE   INPATIENT   Date: 19  Patient Name: Rosangela Malone       Room:   MRN: [id number]   Account #: [de-identified]    : 1950  (77 y.o.)  Gender: female                 REASON FOR MISSED TREATMENT:  Patient unable to participate   -   Pt is sedated and continues on vent support. Will defer OT evaluation until Pt is appropriate to engage in self-care and functional tasks.        Bree Hu, OT

## 2019-04-18 NOTE — PROGRESS NOTES
fluconazole  400 mg Intravenous Q24H    magnesium sulfate  1 g Intravenous Once    metoclopramide  10 mg Intravenous TID    pantoprazole  40 mg Intravenous Daily    And    sodium chloride (PF)  10 mL Intravenous Daily    methylPREDNISolone  40 mg Intravenous Q8H    sodium chloride  250 mL Intravenous Once    vancomycin  1,000 mg Intravenous Q12H    polyvinyl alcohol  1 drop Both Eyes Q4H    And    lubrifresh P.M. Both Eyes Q4H    chlorhexidine  15 mL Mouth/Throat BID    ipratropium  0.5 mg Nebulization Q4H    insulin lispro  0-12 Units Subcutaneous Q6H    levalbuterol  1.25 mg Nebulization Q4H    docusate sodium  100 mg Oral TID    vancomycin (VANCOCIN) intermittent dosing (placeholder)   Other RX Placeholder    levofloxacin  500 mg Intravenous Q24H    venlafaxine  37.5 mg Oral TID    clopidogrel  75 mg Oral Daily    aspirin  81 mg Oral Daily    sodium chloride flush  10 mL Intravenous 2 times per day    enoxaparin  40 mg Subcutaneous Daily     Continuous Infusions:    PN-Adult 2-in-1 Central Line (Standard) 65 mL/hr at 04/17/19 1819    propofol 35 mcg/kg/min (04/18/19 0827)    vasopressin (Septic Shock) infusion Stopped (04/14/19 1930)    norepinephrine Stopped (04/16/19 2102)    lactated ringers 125 mL/hr at 04/18/19 0329    dextrose       PRN Meds: fentanNYL, sodium chloride nebulizer, fentanNYL, oxyCODONE-acetaminophen, magnesium sulfate, sodium phosphate IVPB **OR** sodium phosphate IVPB, potassium chloride **OR** potassium alternative oral replacement **OR** potassium chloride, glucose, dextrose, glucagon (rDNA), dextrose, milk and molasses, sodium chloride flush, acetaminophen    Data:     Past Medical History:   has a past medical history of Abnormal computed tomography of cervical spine, CVA (cerebral vascular accident) ([address] GERD (gastroesophageal reflux disease), Hypertension, Paraproteinemia, and Weight loss. Social History:   reports that she has been smoking.   She has a 30.00 pack-year smoking history. She has never used smokeless tobacco. She reports that she drank about 16.8 oz of alcohol per week. She reports that she does not use drugs. Family History: History reviewed. No pertinent family history. Vitals:  BP (!) 93/55   Pulse 91   Temp 98.6 °F (37 °C) (Oral)   Resp 19   Ht 5' (1.524 m)   Wt 134 lb 11.2 oz (61.1 kg)   SpO2 100%   BMI 26.31 kg/m²   Temp (24hrs), Av.4 °F (36.9 °C), Min:97.9 °F (36.6 °C), Max:98.8 °F (37.1 °C)    Recent Labs     19  0135 19  0633 19  0739 19  1120   POCGLU 114* 111* 112* 97       I/O(24Hr): Intake/Output Summary (Last 24 hours) at 2019 1326  Last data filed at 2019 6725  Gross per 24 hour   Intake 6841.59 ml   Output 3250 ml   Net 3591.59 ml       Labs:    [unfilled]    Lab Results   Component Value Date/Time    SPECIAL NOT REPORTED 2019 03:05 PM     Lab Results   Component Value Date/Time    CULTURE (A) 2019 03:05 PM     YEAST, NOT CANDIDA ALBICANS OR CANDIDA DUBLINIENSIS  HEAVY GROWTH      CULTURE PRESUMPTIVE CANDIDA ALBICANS  HEAVY GROWTH   (A) 2019 03:05 PM    CULTURE NO NORMAL HILL (A) 2019 03:05 PM       [unfilled]    Radiology:    Freddy Latin Femur Left (min 2 Views)    Result Date: 3/27/2019  EXAMINATION: 4 XRAY VIEWS OF THE LEFT FEMUR; 2 XRAY VIEWS OF THE LEFT KNEE; 3 XRAY VIEWS OF THE LEFT TIBIA AND FIBULA 3/27/2019 7:00 pm COMPARISON: Left hip 2015. HISTORY: ORDERING SYSTEM PROVIDED HISTORY: pain TECHNOLOGIST PROVIDED HISTORY: pain Ordering Physician Provided Reason for Exam: pt twisted wrong, lt knee pain, unable to straighten knee. Acuity: Acute Type of Exam: Initial FINDINGS: Left femur, four views: The bones are diffusely osteopenic. No acute fracture deformity. The hip joint is maintained. The femoral head projects within the acetabulum. No focal soft tissue abnormality is seen. Left knee, two views: The knee is flexed.   No acute osseous abnormality. No joint effusion. Joint spaces are not optimally profiled but no significant arthritic changes are apparent. Left tib-fib, three views: There is evidence of a remote healed distal tibia fracture. No acute fracture or dislocation is seen. No focal soft tissue abnormality. No acute osseous abnormality of the left femur. No acute osseous abnormality of the left knee. No acute osseous abnormality of the left tib-fib. Healed remote distal tibia fracture. Marked osteopenia, likely due to disuse. Xr Knee Left (1-2 Views)    Result Date: 3/27/2019  EXAMINATION: 4 XRAY VIEWS OF THE LEFT FEMUR; 2 XRAY VIEWS OF THE LEFT KNEE; 3 XRAY VIEWS OF THE LEFT TIBIA AND FIBULA 3/27/2019 7:00 pm COMPARISON: Left hip 11/14/2015. HISTORY: ORDERING SYSTEM PROVIDED HISTORY: pain TECHNOLOGIST PROVIDED HISTORY: pain Ordering Physician Provided Reason for Exam: pt twisted wrong, lt knee pain, unable to straighten knee. Acuity: Acute Type of Exam: Initial FINDINGS: Left femur, four views: The bones are diffusely osteopenic. No acute fracture deformity. The hip joint is maintained. The femoral head projects within the acetabulum. No focal soft tissue abnormality is seen. Left knee, two views: The knee is flexed. No acute osseous abnormality. No joint effusion. Joint spaces are not optimally profiled but no significant arthritic changes are apparent. Left tib-fib, three views: There is evidence of a remote healed distal tibia fracture. No acute fracture or dislocation is seen. No focal soft tissue abnormality. No acute osseous abnormality of the left femur. No acute osseous abnormality of the left knee. No acute osseous abnormality of the left tib-fib. Healed remote distal tibia fracture. Marked osteopenia, likely due to disuse.      Xr Knee Left (3 Views)    Result Date: 3/30/2019  EXAMINATION: 3 XRAY VIEWS OF THE LEFT KNEE 3/30/2019 10:58 am COMPARISON: March 27, 2018 HISTORY: ORDERING SYSTEM PROVIDED HISTORY: F/u L knee pain after cast TECHNOLOGIST PROVIDED HISTORY: AP, oblique, lateral F/u L knee pain after cast FINDINGS: Marked osteopenia. Interval casting, which degrades fine osseous detail question cortical offset in the lateral femoral metaphysis. No malalignment identified. No significant joint effusion. Interval casting. Question nondisplaced fracture in the lateral femoral metaphysis. Osteopenia. Xr Tibia Fibula Left (2 Views)    Result Date: 3/27/2019  EXAMINATION: 4 XRAY VIEWS OF THE LEFT FEMUR; 2 XRAY VIEWS OF THE LEFT KNEE; 3 XRAY VIEWS OF THE LEFT TIBIA AND FIBULA 3/27/2019 7:00 pm COMPARISON: Left hip 11/14/2015. HISTORY: ORDERING SYSTEM PROVIDED HISTORY: pain TECHNOLOGIST PROVIDED HISTORY: pain Ordering Physician Provided Reason for Exam: pt twisted wrong, lt knee pain, unable to straighten knee. Acuity: Acute Type of Exam: Initial FINDINGS: Left femur, four views: The bones are diffusely osteopenic. No acute fracture deformity. The hip joint is maintained. The femoral head projects within the acetabulum. No focal soft tissue abnormality is seen. Left knee, two views: The knee is flexed. No acute osseous abnormality. No joint effusion. Joint spaces are not optimally profiled but no significant arthritic changes are apparent. Left tib-fib, three views: There is evidence of a remote healed distal tibia fracture. No acute fracture or dislocation is seen. No focal soft tissue abnormality. No acute osseous abnormality of the left femur. No acute osseous abnormality of the left knee. No acute osseous abnormality of the left tib-fib. Healed remote distal tibia fracture. Marked osteopenia, likely due to disuse.      Ct Abdomen Pelvis W Iv Contrast    Result Date: 4/11/2019  EXAMINATION: CT OF THE ABDOMEN AND PELVIS WITH CONTRAST 4/11/2019 5:14 pm TECHNIQUE: CT of the abdomen and pelvis was performed with the administration of intravenous contrast. Multiplanar reformatted images are provided for review. Dose modulation, iterative reconstruction, and/or weight based adjustment of the mA/kV was utilized to reduce the radiation dose to as low as reasonably achievable. COMPARISON: None. HISTORY: ORDERING SYSTEM PROVIDED HISTORY: Abdominal pain TECHNOLOGIST PROVIDED HISTORY: IV Only Contrast Ordering Physician Provided Reason for Exam: patient c/o abd pain for an hour FINDINGS: Lower Chest: Trace pleural fluid bilaterally is noted. Indwelling cardiac pacemaker is present. Heart size is normal.  Coronary artery calcifications are evident. The esophagus is significantly dilated and fluid-filled. No paraesophageal adenopathy is evident. Organs: The spleen, pancreas, and adrenals are unremarkable. The liver contains hypodensities, likely cysts. The gallbladder is distended and contains a solitary gallstone. The kidneys excrete contrast bilaterally. Extrarenal collecting systems are noted. The ureters are mildly dilated down to the urinary bladder without evidence of intraluminal or ureteral obstructing calculi. GI/Bowel: Marked distention of the stomach is noted; this continues to the pylorus with appearance suggesting partial gastric outlet obstruction. Fluid is present in some small bowel loops and colon distal to the stomach. No small bowel obstruction. Pelvis: Marked distention of the urinary bladder is noted. 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 Distended ureters may be related to reflux or infection. No free pelvic fluid, pelvic or inguinal adenopathy is noted. Peritoneum/Retroperitoneum: No aortic aneurysm. Mild to moderate plaque is noted in the infrarenal abdominal aorta and both proximal iliac arteries. Shotty lymph nodes are present around the aorta in the upper abdomen. No mesenteric adenopathy is noted. Bones/Soft Tissues: Degenerative changes are present in the hips and lower lumbar facets.  Estimated biologic radiation dose light. Conjunctivae and EOM are normal.   Neck: Normal range of motion. Right IJ central line in place. Site clean and dry. Cardiovascular: Normal rate, regular rhythm, normal heart sounds and intact distal pulses. Exam reveals no gallop and no friction rub. No murmur heard. Pulmonary/Chest: Effort normal. No stridor. No respiratory distress. She has no wheezes. She has no rales. Intubated and mechanically ventilated. Abdominal: Soft. Bowel sounds are normal. She exhibits no mass. There is no tenderness (Decreased tenderness to light palpation. Improvement from prior. ). There is no rebound and no guarding. Musculoskeletal: Normal range of motion. She exhibits no edema (Anasarca; 3+ b/l pitting edema in upper and lower ext. ). Left knee wrapped in dressing. Posterior bruising noted. Neurological:   Intubated, sedated   Skin: Skin is warm and dry. Capillary refill takes less than 2 seconds. She is not diaphoretic. No pallor. Nursing note and vitals reviewed. 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 Coli Emphysematous Cystitis + MRSA PNA   WBC 17-->22.4   Levaquin, Vanc   CXR multifocal airspace disease, left basilar consolidation/effusion   Off of levophed at this time   ECHO 4/12: LVEF 45%, RVSP 36 mmHg   Urology consult, appreciate recs --> cont cath until out of ICU and stable   Critical care consult, appreciate recs   Gen Surg, right IJ central line placed on 4/12   ID consult, appreciate recs   Resp Cx: mod budding sedation. She does have a very low albumin she was given albumin infusion and if she still has low BP will start vasopressors.   Her sputum was positive for Candida albicans , she is a high risk for Candida septicemia, we will repeat blood cultures and she was started on Diflucan  Electronically signed by Tressa Pink MD on 4/18/2019 at 1:55 PM

## 2019-04-19 ENCOUNTER — APPOINTMENT (OUTPATIENT)
Dept: GENERAL RADIOLOGY | Age: 69
DRG: 853 | End: 2019-04-19
Payer: COMMERCIAL

## 2019-04-19 ENCOUNTER — APPOINTMENT (OUTPATIENT)
Dept: ULTRASOUND IMAGING | Age: 69
DRG: 853 | End: 2019-04-19
Payer: COMMERCIAL

## 2019-04-19 LAB
ABO/RH: NORMAL
ALBUMIN SERPL-MCNC: 2.3 G/DL (ref 3.5–5.2)
ALBUMIN/GLOBULIN RATIO: ABNORMAL (ref 1–2.5)
ALLEN TEST: ABNORMAL
ALP BLD-CCNC: 106 U/L (ref 35–104)
ALT SERPL-CCNC: 17 U/L (ref 5–33)
ANION GAP SERPL CALCULATED.3IONS-SCNC: 9 MMOL/L (ref 9–17)
ANTIBODY SCREEN: NEGATIVE
ARM BAND NUMBER: NORMAL
AST SERPL-CCNC: 30 U/L
BILIRUB SERPL-MCNC: 0.51 MG/DL (ref 0.3–1.2)
BILIRUBIN URINE: NEGATIVE
BLD PROD TYP BPU: NORMAL
BLD PROD TYP BPU: NORMAL
BUN BLDV-MCNC: 15 MG/DL (ref 8–23)
BUN/CREAT BLD: ABNORMAL (ref 9–20)
CALCIUM SERPL-MCNC: 7.6 MG/DL (ref 8.6–10.4)
CARBOXYHEMOGLOBIN: 0.8 % (ref 0–5)
CHLORIDE BLD-SCNC: 99 MMOL/L (ref 98–107)
CO2: 31 MMOL/L (ref 20–31)
COLOR: YELLOW
COMMENT UA: NORMAL
CREAT SERPL-MCNC: <0.4 MG/DL (ref 0.5–0.9)
CROSSMATCH RESULT: NORMAL
CROSSMATCH RESULT: NORMAL
DISPENSE STATUS BLOOD BANK: NORMAL
DISPENSE STATUS BLOOD BANK: NORMAL
EXPIRATION DATE: NORMAL
FIO2: 30
GFR AFRICAN AMERICAN: ABNORMAL ML/MIN
GFR NON-AFRICAN AMERICAN: ABNORMAL ML/MIN
GFR SERPL CREATININE-BSD FRML MDRD: ABNORMAL ML/MIN/{1.73_M2}
GFR SERPL CREATININE-BSD FRML MDRD: ABNORMAL ML/MIN/{1.73_M2}
GLUCOSE BLD-MCNC: 127 MG/DL (ref 65–105)
GLUCOSE BLD-MCNC: 133 MG/DL (ref 65–105)
GLUCOSE BLD-MCNC: 134 MG/DL (ref 65–105)
GLUCOSE BLD-MCNC: 135 MG/DL (ref 65–105)
GLUCOSE BLD-MCNC: 141 MG/DL (ref 70–99)
GLUCOSE BLD-MCNC: 155 MG/DL (ref 65–105)
GLUCOSE BLD-MCNC: 162 MG/DL (ref 65–105)
GLUCOSE URINE: NEGATIVE
HCO3 ARTERIAL: 34.6 MMOL/L (ref 22–26)
HCT VFR BLD CALC: 28.4 % (ref 36–46)
HCT VFR BLD CALC: 31.3 % (ref 36–46)
HCT VFR BLD CALC: 33.2 % (ref 36–46)
HEMOGLOBIN: 10.1 G/DL (ref 12–16)
HEMOGLOBIN: 11 G/DL (ref 12–16)
HEMOGLOBIN: 9.3 G/DL (ref 12–16)
INR BLD: 1.1
KETONES, URINE: NEGATIVE
LEUKOCYTE ESTERASE, URINE: NEGATIVE
MAGNESIUM: 1.8 MG/DL (ref 1.6–2.6)
MCH RBC QN AUTO: 30.2 PG (ref 26–34)
MCHC RBC AUTO-ENTMCNC: 33.1 G/DL (ref 31–37)
MCV RBC AUTO: 91.1 FL (ref 80–100)
METHEMOGLOBIN: 0.8 % (ref 0–1.9)
MODE: ABNORMAL
NEGATIVE BASE EXCESS, ART: ABNORMAL MMOL/L (ref 0–2)
NITRITE, URINE: NEGATIVE
NOTIFICATION TIME: ABNORMAL
NOTIFICATION: ABNORMAL
NRBC AUTOMATED: ABNORMAL PER 100 WBC
O2 DEVICE/FLOW/%: ABNORMAL
O2 SAT, ARTERIAL: 94.8 % (ref 95–98)
OXYHEMOGLOBIN: ABNORMAL % (ref 95–98)
PATIENT TEMP: 37
PCO2 ARTERIAL: 42.5 MMHG (ref 35–45)
PCO2, ART, TEMP ADJ: ABNORMAL (ref 35–45)
PDW BLD-RTO: 14.6 % (ref 11.5–14.9)
PEEP/CPAP: 5
PH ARTERIAL: 7.52 (ref 7.35–7.45)
PH UA: 7 (ref 5–8)
PH, ART, TEMP ADJ: ABNORMAL (ref 7.35–7.45)
PHOSPHORUS: 3.6 MG/DL (ref 2.6–4.5)
PLATELET # BLD: 191 K/UL (ref 150–450)
PMV BLD AUTO: 8.7 FL (ref 6–12)
PO2 ARTERIAL: 73.3 MMHG (ref 80–100)
PO2, ART, TEMP ADJ: ABNORMAL MMHG (ref 80–100)
POSITIVE BASE EXCESS, ART: 11.7 MMOL/L (ref 0–2)
POTASSIUM SERPL-SCNC: 4.6 MMOL/L (ref 3.7–5.3)
PROTEIN UA: NEGATIVE
PROTHROMBIN TIME: 14.4 SEC (ref 11.8–14.6)
PSV: ABNORMAL
PT. POSITION: ABNORMAL
RBC # BLD: 3.64 M/UL (ref 4–5.2)
RESPIRATORY RATE: 21
SAMPLE SITE: ABNORMAL
SET RATE: 18
SODIUM BLD-SCNC: 139 MMOL/L (ref 135–144)
SPECIFIC GRAVITY UA: 1.01 (ref 1–1.03)
TEXT FOR RESPIRATORY: ABNORMAL
TOTAL HB: ABNORMAL G/DL (ref 12–16)
TOTAL PROTEIN: 4.6 G/DL (ref 6.4–8.3)
TOTAL RATE: 21
TRANSFUSION STATUS: NORMAL
TRANSFUSION STATUS: NORMAL
TRIGL SERPL-MCNC: 107 MG/DL
TURBIDITY: CLEAR
UNIT DIVISION: 0
UNIT DIVISION: 0
UNIT NUMBER: NORMAL
UNIT NUMBER: NORMAL
URINE HGB: NEGATIVE
UROBILINOGEN, URINE: NORMAL
VANCOMYCIN TROUGH DATE LAST DOSE: NORMAL
VANCOMYCIN TROUGH DOSE AMOUNT: NORMAL
VANCOMYCIN TROUGH TIME LAST DOSE: 1056
VANCOMYCIN TROUGH: 18.3 UG/ML (ref 10–20)
VT: 360
WBC # BLD: 27.5 K/UL (ref 3.5–11)

## 2019-04-19 PROCEDURE — 81003 URINALYSIS AUTO W/O SCOPE: CPT

## 2019-04-19 PROCEDURE — 2580000003 HC RX 258: Performed by: INTERNAL MEDICINE

## 2019-04-19 PROCEDURE — 85610 PROTHROMBIN TIME: CPT

## 2019-04-19 PROCEDURE — 85014 HEMATOCRIT: CPT

## 2019-04-19 PROCEDURE — 6360000002 HC RX W HCPCS: Performed by: INTERNAL MEDICINE

## 2019-04-19 PROCEDURE — 6370000000 HC RX 637 (ALT 250 FOR IP): Performed by: INTERNAL MEDICINE

## 2019-04-19 PROCEDURE — 6360000002 HC RX W HCPCS: Performed by: STUDENT IN AN ORGANIZED HEALTH CARE EDUCATION/TRAINING PROGRAM

## 2019-04-19 PROCEDURE — 51702 INSERT TEMP BLADDER CATH: CPT

## 2019-04-19 PROCEDURE — C9113 INJ PANTOPRAZOLE SODIUM, VIA: HCPCS | Performed by: INTERNAL MEDICINE

## 2019-04-19 PROCEDURE — 94003 VENT MGMT INPAT SUBQ DAY: CPT

## 2019-04-19 PROCEDURE — 2500000003 HC RX 250 WO HCPCS: Performed by: STUDENT IN AN ORGANIZED HEALTH CARE EDUCATION/TRAINING PROGRAM

## 2019-04-19 PROCEDURE — 74018 RADEX ABDOMEN 1 VIEW: CPT

## 2019-04-19 PROCEDURE — 94640 AIRWAY INHALATION TREATMENT: CPT

## 2019-04-19 PROCEDURE — 84478 ASSAY OF TRIGLYCERIDES: CPT

## 2019-04-19 PROCEDURE — 99233 SBSQ HOSP IP/OBS HIGH 50: CPT | Performed by: INTERNAL MEDICINE

## 2019-04-19 PROCEDURE — 71045 X-RAY EXAM CHEST 1 VIEW: CPT

## 2019-04-19 PROCEDURE — 85018 HEMOGLOBIN: CPT

## 2019-04-19 PROCEDURE — 82805 BLOOD GASES W/O2 SATURATION: CPT

## 2019-04-19 PROCEDURE — 36415 COLL VENOUS BLD VENIPUNCTURE: CPT

## 2019-04-19 PROCEDURE — 97110 THERAPEUTIC EXERCISES: CPT

## 2019-04-19 PROCEDURE — 94762 N-INVAS EAR/PLS OXIMTRY CONT: CPT

## 2019-04-19 PROCEDURE — 2700000000 HC OXYGEN THERAPY PER DAY

## 2019-04-19 PROCEDURE — 82947 ASSAY GLUCOSE BLOOD QUANT: CPT

## 2019-04-19 PROCEDURE — 83735 ASSAY OF MAGNESIUM: CPT

## 2019-04-19 PROCEDURE — 84100 ASSAY OF PHOSPHORUS: CPT

## 2019-04-19 PROCEDURE — 76705 ECHO EXAM OF ABDOMEN: CPT

## 2019-04-19 PROCEDURE — 2000000000 HC ICU R&B

## 2019-04-19 PROCEDURE — 85027 COMPLETE CBC AUTOMATED: CPT

## 2019-04-19 PROCEDURE — 80053 COMPREHEN METABOLIC PANEL: CPT

## 2019-04-19 RX ADMIN — METOCLOPRAMIDE 10 MG: 5 INJECTION, SOLUTION INTRAMUSCULAR; INTRAVENOUS at 19:43

## 2019-04-19 RX ADMIN — METHYLPREDNISOLONE SODIUM SUCCINATE 40 MG: 40 INJECTION, POWDER, FOR SOLUTION INTRAMUSCULAR; INTRAVENOUS at 08:01

## 2019-04-19 RX ADMIN — POLYVINYL ALCOHOL 1 DROP: 14 SOLUTION/ DROPS OPHTHALMIC at 11:42

## 2019-04-19 RX ADMIN — FENTANYL CITRATE 50 MCG: 50 INJECTION INTRAMUSCULAR; INTRAVENOUS at 19:44

## 2019-04-19 RX ADMIN — METOCLOPRAMIDE 10 MG: 5 INJECTION, SOLUTION INTRAMUSCULAR; INTRAVENOUS at 13:29

## 2019-04-19 RX ADMIN — IPRATROPIUM BROMIDE 0.5 MG: 0.5 SOLUTION RESPIRATORY (INHALATION) at 11:26

## 2019-04-19 RX ADMIN — INSULIN LISPRO 2 UNITS: 100 INJECTION, SOLUTION INTRAVENOUS; SUBCUTANEOUS at 13:09

## 2019-04-19 RX ADMIN — METOCLOPRAMIDE 10 MG: 5 INJECTION, SOLUTION INTRAMUSCULAR; INTRAVENOUS at 08:01

## 2019-04-19 RX ADMIN — SODIUM CHLORIDE, POTASSIUM CHLORIDE, SODIUM LACTATE AND CALCIUM CHLORIDE: 600; 310; 30; 20 INJECTION, SOLUTION INTRAVENOUS at 04:39

## 2019-04-19 RX ADMIN — IPRATROPIUM BROMIDE 0.5 MG: 0.5 SOLUTION RESPIRATORY (INHALATION) at 15:39

## 2019-04-19 RX ADMIN — LEVALBUTEROL 1.25 MG: 1.25 SOLUTION, CONCENTRATE RESPIRATORY (INHALATION) at 07:09

## 2019-04-19 RX ADMIN — VANCOMYCIN HYDROCHLORIDE 1000 MG: 1 INJECTION, POWDER, LYOPHILIZED, FOR SOLUTION INTRAVENOUS at 13:09

## 2019-04-19 RX ADMIN — POLYVINYL ALCOHOL 1 DROP: 14 SOLUTION/ DROPS OPHTHALMIC at 15:35

## 2019-04-19 RX ADMIN — POLYVINYL ALCOHOL 1 DROP: 14 SOLUTION/ DROPS OPHTHALMIC at 08:02

## 2019-04-19 RX ADMIN — PROPOFOL 30 MCG/KG/MIN: 10 INJECTION, EMULSION INTRAVENOUS at 01:38

## 2019-04-19 RX ADMIN — CHLORHEXIDINE GLUCONATE 0.12% ORAL RINSE 15 ML: 1.2 LIQUID ORAL at 19:43

## 2019-04-19 RX ADMIN — LEVALBUTEROL 1.25 MG: 1.25 SOLUTION, CONCENTRATE RESPIRATORY (INHALATION) at 11:26

## 2019-04-19 RX ADMIN — IPRATROPIUM BROMIDE 0.5 MG: 0.5 SOLUTION RESPIRATORY (INHALATION) at 04:11

## 2019-04-19 RX ADMIN — IPRATROPIUM BROMIDE 0.5 MG: 0.5 SOLUTION RESPIRATORY (INHALATION) at 07:09

## 2019-04-19 RX ADMIN — FLUCONAZOLE, SODIUM CHLORIDE 400 MG: 2 INJECTION INTRAVENOUS at 08:02

## 2019-04-19 RX ADMIN — SODIUM CHLORIDE, POTASSIUM CHLORIDE, SODIUM LACTATE AND CALCIUM CHLORIDE: 600; 310; 30; 20 INJECTION, SOLUTION INTRAVENOUS at 22:58

## 2019-04-19 RX ADMIN — Medication 10 ML: at 08:01

## 2019-04-19 RX ADMIN — POLYVINYL ALCOHOL 1 DROP: 14 SOLUTION/ DROPS OPHTHALMIC at 01:30

## 2019-04-19 RX ADMIN — LEVALBUTEROL 1.25 MG: 1.25 SOLUTION, CONCENTRATE RESPIRATORY (INHALATION) at 15:39

## 2019-04-19 RX ADMIN — FENTANYL CITRATE 50 MCG: 50 INJECTION INTRAMUSCULAR; INTRAVENOUS at 11:41

## 2019-04-19 RX ADMIN — IPRATROPIUM BROMIDE 0.5 MG: 0.5 SOLUTION RESPIRATORY (INHALATION) at 19:12

## 2019-04-19 RX ADMIN — METHYLPREDNISOLONE SODIUM SUCCINATE 40 MG: 40 INJECTION, POWDER, FOR SOLUTION INTRAMUSCULAR; INTRAVENOUS at 01:30

## 2019-04-19 RX ADMIN — LEVALBUTEROL 1.25 MG: 1.25 SOLUTION, CONCENTRATE RESPIRATORY (INHALATION) at 19:12

## 2019-04-19 RX ADMIN — LEVALBUTEROL 1.25 MG: 1.25 SOLUTION, CONCENTRATE RESPIRATORY (INHALATION) at 04:11

## 2019-04-19 RX ADMIN — Medication 10 ML: at 08:07

## 2019-04-19 RX ADMIN — ENOXAPARIN SODIUM 40 MG: 100 INJECTION SUBCUTANEOUS at 08:00

## 2019-04-19 RX ADMIN — PANTOPRAZOLE SODIUM 40 MG: 40 INJECTION, POWDER, FOR SOLUTION INTRAVENOUS at 08:01

## 2019-04-19 RX ADMIN — CALCIUM GLUCONATE: 94 INJECTION, SOLUTION INTRAVENOUS at 18:19

## 2019-04-19 RX ADMIN — VANCOMYCIN HYDROCHLORIDE 1000 MG: 1 INJECTION, POWDER, LYOPHILIZED, FOR SOLUTION INTRAVENOUS at 01:31

## 2019-04-19 RX ADMIN — FENTANYL CITRATE 50 MCG: 50 INJECTION INTRAMUSCULAR; INTRAVENOUS at 22:58

## 2019-04-19 RX ADMIN — LEVOFLOXACIN 500 MG: 5 INJECTION, SOLUTION INTRAVENOUS at 13:09

## 2019-04-19 RX ADMIN — FENTANYL CITRATE 50 MCG: 50 INJECTION INTRAMUSCULAR; INTRAVENOUS at 04:33

## 2019-04-19 RX ADMIN — METHYLPREDNISOLONE SODIUM SUCCINATE 40 MG: 40 INJECTION, POWDER, FOR SOLUTION INTRAMUSCULAR; INTRAVENOUS at 17:44

## 2019-04-19 RX ADMIN — CHLORHEXIDINE GLUCONATE 0.12% ORAL RINSE 15 ML: 1.2 LIQUID ORAL at 08:02

## 2019-04-19 RX ADMIN — FENTANYL CITRATE 50 MCG: 50 INJECTION INTRAMUSCULAR; INTRAVENOUS at 15:30

## 2019-04-19 RX ADMIN — POLYVINYL ALCOHOL 1 DROP: 14 SOLUTION/ DROPS OPHTHALMIC at 04:15

## 2019-04-19 ASSESSMENT — PAIN DESCRIPTION - LOCATION: LOCATION: GENERALIZED

## 2019-04-19 ASSESSMENT — PULMONARY FUNCTION TESTS
PIF_VALUE: 13
PIF_VALUE: 11
PIF_VALUE: 13
PIF_VALUE: 7
PIF_VALUE: 13
PIF_VALUE: 13

## 2019-04-19 ASSESSMENT — PAIN SCALES - GENERAL
PAINLEVEL_OUTOF10: 0
PAINLEVEL_OUTOF10: 10
PAINLEVEL_OUTOF10: 8
PAINLEVEL_OUTOF10: 6
PAINLEVEL_OUTOF10: 8
PAINLEVEL_OUTOF10: 10
PAINLEVEL_OUTOF10: 10

## 2019-04-19 NOTE — PROGRESS NOTES
General Surgery Progress Note      S/O  Patient seen and examined  Afebrile. Vitals are stable. Remains off pressors  Awake. Alert and oriented. No acute distress  Heart regular  Lungs diminished  Abdomen soft. Nondistended. Tender to palpation in the right upper quadrant.   Nasogastric tube output is clear  No peripheral edema    CBC with Differential:    Lab Results   Component Value Date    WBC 27.5 04/19/2019    RBC 3.64 04/19/2019    RBC 3.66 05/23/2012    HGB 11.0 04/19/2019    HCT 33.2 04/19/2019     04/19/2019     05/23/2012    MCV 91.1 04/19/2019    MCH 30.2 04/19/2019    MCHC 33.1 04/19/2019    RDW 14.6 04/19/2019    METASPCT 2 04/11/2019    LYMPHOPCT 4 04/11/2019    MONOPCT 2 04/11/2019    BASOPCT 0 04/11/2019    MONOSABS 0.96 04/11/2019    LYMPHSABS 1.92 04/11/2019    EOSABS 0.00 04/11/2019    BASOSABS 0.00 04/11/2019    DIFFTYPE NOT REPORTED 04/11/2019     CMP:    Lab Results   Component Value Date     04/19/2019    K 4.6 04/19/2019    CL 99 04/19/2019    CO2 31 04/19/2019    BUN 15 04/19/2019    CREATININE <0.40 04/19/2019    GFRAA CANNOT BE CALCULATED 04/19/2019    LABGLOM CANNOT BE CALCULATED 04/19/2019    GLUCOSE 141 04/19/2019    GLUCOSE 87 05/21/2012    PROT 4.6 04/19/2019    LABALBU 2.3 04/19/2019    LABALBU 4.6 05/17/2012    CALCIUM 7.6 04/19/2019    BILITOT 0.51 04/19/2019    ALKPHOS 106 04/19/2019    AST 30 04/19/2019    ALT 17 04/19/2019     A/P:  Sepsis    Will check KUB  Will check ultrasound abdomen  Continue current care

## 2019-04-19 NOTE — PROGRESS NOTES
Writer attempted to follow up on ekgs ordered for QT prolongation 4/17, 4/18, 4/19. VICKY Witt states she performed ekg today 4/19, no tracings for this patient in [facility] system. RN states she does not have access to ekg machine at this time, writer unable to check machine for transfer status.

## 2019-04-19 NOTE — PROGRESS NOTES
Nutrition Assessment (Parenteral Nutrition)    Type and Reason for Visit: Reassess    Nutrition Recommendations: Following changes made in additives: Na acetate- 60 to 0 mEq, NaCl- 45 to 105 mEq, K acetate- 10 to 0 mEq, KCl- 60 to 55 mEq, Mg- 10 to 11 mEq, add MVI & trace elements. Nutrition Assessment: Pt stable from a nutritional standpoint; TPN to continue at 65 ml/hr. Pt remains intubated with Propofol at 5.2 ml/hr providing 137 kcal, rechecking triglyceride level since last level was 290. Malnutrition Assessment:  · Malnutrition Status: At risk for malnutrition  · Context: Acute illness or injury  · Findings of the 6 clinical characteristics of malnutrition (Minimum of 2 out of 6 clinical characteristics is required to make the diagnosis of moderate or severe Protein Calorie Malnutrition based on AND/ASPEN Guidelines):  1. Energy Intake-Less than or equal to 75% of estimated energy requirement(Previously; improving with parenteral nutrition), Greater than or equal to 5 days    2. Weight Loss-Unable to assess(edema present),    3. Fat Loss-Unable to assess,    4. Muscle Loss-Unable to assess,    5. Fluid Accumulation-(moderate fluid accumulation), Extremities  6.  Strength-Not measured    Nutrition Risk Level: High    Nutrient Needs:  · Estimated Daily Total Kcal: 8103-9997 based on wt of 25-27 per kg using wt of 57.2 kg  · Estimated Daily Protein (g): 63-68 based on 1.4-1.5 gm/kg IBW(revised)    Nutrition Diagnosis:   · Problem: Inadequate oral intake  · Etiology: related to Alteration in GI function, Insufficient energy/nutrient consumption     Signs and symptoms:  as evidenced by NPO status due to medical condition, Nutrition support - PN    Objective Information:  · Nutrition-Focused Physical Findings: Edema: + 2 all extremities.  Abdominal pain & distention-surgery ordered USGB and KUB  · Wound Type: Stage I, Pressure Ulcer(Stage pressure ulcer on buttocks; thigh wound)  · Current Nutrition Therapies:  · Oral Diet Orders: NPO   · Oral Diet intake: NPO  · Oral Nutrition Supplement (ONS) Orders: None  · ONS intake: NPO  · Parenteral Nutrition Orders:  · Type and Formula: 2-in-1 Custom, Premix Central   · Lipids: None(On Propofol & TG elevated)  · Rate/Volume: 65 ml/ 1560 ml daily  · Duration: Continuous  · Current PN Order Provides: 1373 kcal, 78 gm pro  · Goal PN Orders Provides: 4427-5672 kcal; 63-68 gm pro  · Additional Calories: Propofol at 5.2 ml/hr providing 137 kcal  · Anthropometric Measures:  · Ht: 5' (152.4 cm)   · Current Body Wt: 144 lb (65.3 kg)  · Admission Body Wt: 102 lb (46.3 kg)  · Ideal Body Wt: 100 lb (45.4 kg), % Ideal Body 102% based on admission wt  · BMI Classification: BMI 25.0 - 29.9 Overweight    Nutrition Interventions:   Continue NPO, Modify current Parenteral Nutrition  Continued Inpatient Monitoring    Nutrition Evaluation:   · Evaluation: Progressing toward goals   · Goals: PN to meet greater 90% of estimated nutrition needs   · Monitoring: Nutrition Progression, PN Intake, PN Tolerance, Wound Healing, I&O, Weight, Pertinent Labs, Monitor Bowel Function      Curtis Sims R.D. LJACQUELINE.   Clinical Dietitian  Office: [phone]

## 2019-04-19 NOTE — PROGRESS NOTES
Patient seen regarding gastric distention and ileus.  seen around 2 PM    Patient seen in ICU along with the nursing staff. Patient is on ventilator. Today patient WBC count increased. NG tube not draining much. NG tube is being irrigated every 2-4 hours with tap water. Patient has a large amount of food in the stomach and also gastric distention. No signs of GI bleeding. No hematochezia. Patient has been on Reglan and received 2 doses so far. Blood pressure is 110/50. Pulse is about 90/m. Respirations 15 per minute. Abdomen is minimally bloated. Bowel sounds present. Not clear whether patient has tenderness. No significant ascites. Extremities negative edema. Cardiovascular, pulmonary exams no new changes. Assessment:    Gastric distention with a large amount of food. No outlet obstruction. May have ileus. Recommendations: To continue Reglan for couple of days. Hopefully after extubation ileus may resolve. The patient continued to have issues may need CT of the abdomen. Discussed with nursing staff.

## 2019-04-19 NOTE — PROGRESS NOTES
Pharmacy Vancomycin Consult     Vancomycin Day: 7  Current Dosin mg q12h    Temp max:  98.1    Recent Labs     19  0435 19  0451   BUN 8 12       Recent Labs     19  0435 19  0451   CREATININE <0.40* <0.40*       Recent Labs     19  0435 191   WBC 17.0* 22.4*         Intake/Output Summary (Last 24 hours) at 2019 0117  Last data filed at 2019 1945  Gross per 24 hour   Intake 6091.59 ml   Output 4420 ml   Net 1671.59 ml       Culture Date      Source                       Results  See micro reports    Ht Readings from Last 1 Encounters:   19 5' (1.524 m)        Wt Readings from Last 1 Encounters:   19 134 lb 11.2 oz (61.1 kg)         Body mass index is 26.31 kg/m². CrCl cannot be calculated (This lab value cannot be used to calculate CrCl because it is not a number: <0.40). Trough: 18.3 mcg/ml    Assessment/Plan:  Trough level is therapeutic. Continue current regimen.

## 2019-04-19 NOTE — FLOWSHEET NOTE
Patient grateful for prayer and engaged in conversation about her medical circumstances; listening presence and support;     04/19/19 1129   Encounter Summary   Services provided to: Patient   Referral/Consult From: Rounding   Continue Visiting   (4/19/19)   Complexity of Encounter Moderate   Length of Encounter 15 minutes   Spiritual Assessment Completed Yes   Routine   Type Follow up   Grief and Life Adjustment   Type Adjustment to illness   Assessment Approachable;Coping;Tearful   Intervention Active listening;Explored feelings, thoughts, concerns;Prayer;Sustaining presence/ Ministry of presence; Discussed illness/injury and it's impact   Outcome Comfort;Expressed gratitude;Engaged in conversation;Expressed feelings/needs/concerns;Coping; Hopeful;Receptive

## 2019-04-19 NOTE — PROGRESS NOTES
tomography of cervical spine     sclerotic bone appearance     CVA (cerebral vascular accident) ([address]     left  side weakness    GERD (gastroesophageal reflux disease)     Hypertension     Paraproteinemia     Weight loss       Past Surgical History:   Procedure Laterality Date    INTUBATION  4/14/2019         PACEMAKER PLACEMENT  07/2011    Pacemaker is Medtronic Revo (compatible). Leads placed in 1995 are NOT MRI compatible. Placed at [facility]. V's per Dr. George Bob can not have an MRI.  UPPER GASTROINTESTINAL ENDOSCOPY N/A 4/16/2019    EGD ESOPHAGOGASTRODUODENOSCOPY @ BEDSIDE  ICU 2002 performed by Yesica Gonzalez MD at [address]          Admission Meds  No current facility-administered medications on file prior to encounter. Current Outpatient Medications on File Prior to Encounter   Medication Sig Dispense Refill    oxyCODONE-acetaminophen (PERCOCET) 5-325 MG per tablet Take 1 tablet by mouth every 6 hours as needed for Pain.  senna-docusate (PERICOLACE) 8.6-50 MG per tablet Take 1 tablet by mouth 2 times daily      budesonide-formoterol (SYMBICORT) 160-4.5 MCG/ACT AERO Inhale 2 puffs into the lungs 2 times daily      sucralfate (CARAFATE) 1 GM/10ML suspension Take 1 g by mouth 4 times daily       traZODone (DESYREL) 50 MG tablet Take 50 mg by mouth nightly      tiZANidine (ZANAFLEX) 2 MG tablet Take 2 mg by mouth every 8 hours as needed (left knee)       aspirin 81 MG tablet Take 81 mg by mouth daily      clopidogrel (PLAVIX) 75 MG tablet TAKE 1 TABLET DAILY 30 tablet 2    DOCQLACE 100 MG capsule TAKE 1 CAPSULE BY MOUTH IN THE MORNING & IN THE EVENING -DRINK PLENTYOF WATER WHILE TAKING THIS MEDICINE 30 capsule 1    amLODIPine (NORVASC) 10 MG tablet Take 10 mg by mouth daily.  LORazepam (ATIVAN) 0.5 MG tablet Take 0.5 mg by mouth every 6 hours as needed for Anxiety.  therapeutic multivitamin-minerals (THERAGRAN-M) tablet Take 1 tablet by mouth daily.       omeprazole (PRILOSEC) 20 MG capsule Take 20 mg by mouth daily.  venlafaxine (EFFEXOR) 37.5 MG tablet Take 37.5 mg by mouth 3 times daily.  Misc. Devices [facility] [address] wheelchair with left fupper extremity support  Dx: stroke with left hemiparesis. 1 each 0           Allergies  Allergies   Allergen Reactions    Penicillins Shortness Of Breath and Rash    Amoxicillin         Social   Social History     Tobacco Use    Smoking status: Current Some Day Smoker     Packs/day: 1.00     Years: 30.00     Pack years: 30.00    Smokeless tobacco: Never Used   Substance Use Topics    Alcohol use: Not Currently     Alcohol/week: 16.8 oz     Types: 28 Glasses of wine per week                History reviewed. No pertinent family history. Review of Systems  Intubated unable to obtain     Tolerating antibiotics. Physical Exam  /81   Pulse 109   Temp 98.9 °F (37.2 °C) (Oral)   Resp 18   Ht 5' (1.524 m)   Wt 144 lb 13.5 oz (65.7 kg)   SpO2 95%   BMI 28.29 kg/m²           General Appearance: intubated ,sedated . Skin: warm and dry, no rash or erythema  Head: normocephalic and atraumatic  Eyes: pupils equal, round  Neck: neck supple and non tender   Pulmonary/Chest: coarse to auscultation bilaterally- no wheezes, rales or rhonchi  Cardiovascular: normal rate, regular rhythm, normal S1 and S2, no murmurs. Abdomen: soft, -distended, normal bowel sounds, no masses or organomegaly  Extremities: no cyanosis, clubbing   Edema   Musculoskeletal: left knee cast   Right IJ line   Tran in place    Data Review:    Recent Labs     04/17/19  0435  04/18/19  0451 04/18/19  1558 04/18/19  2353 04/19/19  0341   WBC 17.0*  --  22.4*  --   --  27.5*   HGB 11.2*   < > 11.2* 9.8* 9.3* 11.0*   HCT 32.5*   < > 34.5* 29.5* 28.4* 33.2*   MCV 88.8  --  90.2  --   --  91.1     --  171  --   --  191    < > = values in this interval not displayed.      Recent Labs     04/17/19  0435 04/17/19  1238 04/18/19  0454 04/19/19  0341     --  137 139   K 3.0* 4.1 4.4 4.6     --  100 99   CO2 29  --  30 31   PHOS 1.4* 2.0* 2.7 3.6   BUN 8  --  12 15   CREATININE <0.40*  --  <0.40* <0.40*     Recent Labs     04/18/19  0451 04/18/19  1558 04/19/19  0341   AST 29 24 30   ALT 16 13 17   BILIDIR  --  0.20  --    BILITOT 0.38 0.46 0.51   ALKPHOS 97 80 106*     Recent Labs     04/18/19  1558   LIPASE 52   AMYLASE 259*     Recent Labs     04/17/19  0435 04/18/19  0451 04/19/19  0341   PROTIME 13.6 13.3 14.4   INR 1.0 1.0 1.1       Imaging Studies:                           All appropriate imaging studies and reports reviewed: Yes       Ct Abdomen Pelvis Wo Contrast Additional Contrast? Oral    Result Date: 4/14/2019  EXAMINATION: CT OF THE ABDOMEN AND PELVIS WITHOUT CONTRAST 4/14/2019 7:34 pm TECHNIQUE: CT of the abdomen and pelvis was performed without the administration of intravenous contrast. Multiplanar reformatted images are provided for review. Dose modulation, iterative reconstruction, and/or weight based adjustment of the mA/kV was utilized to reduce the radiation dose to as low as reasonably achievable. COMPARISON: 04/11/2019 HISTORY: ORDERING SYSTEM PROVIDED HISTORY: ABDOMINAL PAIN TECHNOLOGIST PROVIDED HISTORY: Water soluble contrast only please Ordering Physician Provided Reason for Exam: Abdominal pain - Vented patient. Contrast given via nurse through NG tube. Acuity: Unknown Type of Exam: Unknown Relevant Medical/Surgical History: Hx - Sepsis due to urinary tract infection. FINDINGS: Lower Chest: New moderate layering bilateral pleural effusions with bilateral lower lobe atelectasis. Organs: Limited evaluation due lack of intravenous contrast.  Cholelithiasis redemonstrated. No gallbladder wall thickening or biliary ductal dilatation. Scattered tiny hypodense lesions in the liver are too small to characterize but statistically represent benign cysts or hemangiomas and appear unchanged.  The pancreas, spleen, adrenal glands, and kidneys are unremarkable. There is no hydronephrosis or urinary tract calculus. GI/Bowel: The stomach is distended. Enteric tube is in place. No contrast is seen distal to the pylorus and there is contrast reflux into the distal esophagus. There is no evidence of bowel obstruction. The appendix is not definitely visualized. No focal pericecal inflammatory changes are evident. Pelvis: The urinary bladder is decompressed by Tran catheter. No pelvic mass is seen. Peritoneum/Retroperitoneum: Small amount of free fluid in the pelvic cavity. No free air or focal fluid collection. No abnormal lymph node. Normal abdominal aortic caliber. Moderate calcific atherosclerosis. Bones/Soft Tissues: No acute osseous abnormality. Diffuse anasarca. Moderate degenerative changes in the lumbar spine. 1. Distended, contrast filled stomach with reflux of contrast into the esophagus. No enteric contrast is seen distal to the pylorus suggesting delayed gastric emptying in the setting of ileus. 2. New moderate layering bilateral pleural effusions with bilateral lower lobe atelectasis. 3. Small amount of nonspecific free fluid in the pelvic cavity. 4. Anasarca. 5. Cholelithiasis. Xr Chest (single View Frontal)    Result Date: 4/13/2019  EXAMINATION: SINGLE XRAY VIEW OF THE CHEST 4/13/2019 7:18 am COMPARISON: April 12, 2019 HISTORY: ORDERING SYSTEM PROVIDED HISTORY: dyspnea TECHNOLOGIST PROVIDED HISTORY: dyspnea Ordering Physician Provided Reason for Exam: dyspnea Acuity: Acute Type of Exam: Subsequent/Follow-up Additional signs and symptoms: dyspnea FINDINGS: A right IJ catheter is seen with its tip terminating at the superior cavoatrial junction. The left chest wall pacemaker and leads are stable. The cardiomediastinal silhouette is stable. There is interval increased opacity at the right lung base, may be related to atelectasis versus pneumonia.   There is small atelectasis and mild pleural effusion at the left lung base. There is no pneumothorax. There is no acute osseous abnormality. Interval increased opacity at the right lung base, may be related to atelectasis versus pneumonia. Small atelectasis and mild pleural effusion at the left lung, new since the prior study. Xr Femur Left (min 2 Views)    Result Date: 3/27/2019  EXAMINATION: 4 XRAY VIEWS OF THE LEFT FEMUR; 2 XRAY VIEWS OF THE LEFT KNEE; 3 XRAY VIEWS OF THE LEFT TIBIA AND FIBULA 3/27/2019 7:00 pm COMPARISON: Left hip 11/14/2015. HISTORY: ORDERING SYSTEM PROVIDED HISTORY: pain TECHNOLOGIST PROVIDED HISTORY: pain Ordering Physician Provided Reason for Exam: pt twisted wrong, lt knee pain, unable to straighten knee. Acuity: Acute Type of Exam: Initial FINDINGS: Left femur, four views: The bones are diffusely osteopenic. No acute fracture deformity. The hip joint is maintained. The femoral head projects within the acetabulum. No focal soft tissue abnormality is seen. Left knee, two views: The knee is flexed. No acute osseous abnormality. No joint effusion. Joint spaces are not optimally profiled but no significant arthritic changes are apparent. Left tib-fib, three views: There is evidence of a remote healed distal tibia fracture. No acute fracture or dislocation is seen. No focal soft tissue abnormality. No acute osseous abnormality of the left femur. No acute osseous abnormality of the left knee. No acute osseous abnormality of the left tib-fib. Healed remote distal tibia fracture. Marked osteopenia, likely due to disuse. Xr Knee Left (1-2 Views)    Result Date: 3/27/2019  EXAMINATION: 4 XRAY VIEWS OF THE LEFT FEMUR; 2 XRAY VIEWS OF THE LEFT KNEE; 3 XRAY VIEWS OF THE LEFT TIBIA AND FIBULA 3/27/2019 7:00 pm COMPARISON: Left hip 11/14/2015.  HISTORY: ORDERING SYSTEM PROVIDED HISTORY: pain TECHNOLOGIST PROVIDED HISTORY: pain Ordering Physician Provided Reason for Exam: pt twisted wrong, lt knee pain, unable to straighten knee. Acuity: Acute Type of Exam: Initial FINDINGS: Left femur, four views: The bones are diffusely osteopenic. No acute fracture deformity. The hip joint is maintained. The femoral head projects within the acetabulum. No focal soft tissue abnormality is seen. Left knee, two views: The knee is flexed. No acute osseous abnormality. No joint effusion. Joint spaces are not optimally profiled but no significant arthritic changes are apparent. Left tib-fib, three views: There is evidence of a remote healed distal tibia fracture. No acute fracture or dislocation is seen. No focal soft tissue abnormality. No acute osseous abnormality of the left femur. No acute osseous abnormality of the left knee. No acute osseous abnormality of the left tib-fib. Healed remote distal tibia fracture. Marked osteopenia, likely due to disuse. Xr Knee Left (3 Views)    Result Date: 3/30/2019  EXAMINATION: 3 XRAY VIEWS OF THE LEFT KNEE 3/30/2019 10:58 am COMPARISON: March 27, 2018 HISTORY: ORDERING SYSTEM PROVIDED HISTORY: F/u L knee pain after cast TECHNOLOGIST PROVIDED HISTORY: AP, oblique, lateral F/u L knee pain after cast FINDINGS: Marked osteopenia. Interval casting, which degrades fine osseous detail question cortical offset in the lateral femoral metaphysis. No malalignment identified. No significant joint effusion. Interval casting. Question nondisplaced fracture in the lateral femoral metaphysis. Osteopenia. Xr Tibia Fibula Left (2 Views)    Result Date: 3/27/2019  EXAMINATION: 4 XRAY VIEWS OF THE LEFT FEMUR; 2 XRAY VIEWS OF THE LEFT KNEE; 3 XRAY VIEWS OF THE LEFT TIBIA AND FIBULA 3/27/2019 7:00 pm COMPARISON: Left hip 11/14/2015. HISTORY: ORDERING SYSTEM PROVIDED HISTORY: pain TECHNOLOGIST PROVIDED HISTORY: pain Ordering Physician Provided Reason for Exam: pt twisted wrong, lt knee pain, unable to straighten knee. Acuity: Acute Type of Exam: Initial FINDINGS: Left femur, four views:  The bones are diffusely osteopenic. No acute fracture deformity. The hip joint is maintained. The femoral head projects within the acetabulum. No focal soft tissue abnormality is seen. Left knee, two views: The knee is flexed. No acute osseous abnormality. No joint effusion. Joint spaces are not optimally profiled but no significant arthritic changes are apparent. Left tib-fib, three views: There is evidence of a remote healed distal tibia fracture. No acute fracture or dislocation is seen. No focal soft tissue abnormality. No acute osseous abnormality of the left femur. No acute osseous abnormality of the left knee. No acute osseous abnormality of the left tib-fib. Healed remote distal tibia fracture. Marked osteopenia, likely due to disuse. Xr Abdomen (kub) (single Ap View)    Result Date: 4/14/2019  EXAMINATION: SINGLE SUPINE XRAY VIEW(S) OF THE ABDOMEN 4/14/2019 7:39 am COMPARISON: CT abdomen and pelvis  film from 11 April 2019 HISTORY: [address] [facility] [address]: Abd Distention TECHNOLOGIST PROVIDED HISTORY: Abd Distention Ordering Physician Provided Reason for Exam: Abdominal distention. Pt was moving around in the bed. Best films at present time. Acuity: Acute Type of Exam: Initial Additional signs and symptoms: Abdominal distention. Pt was moving around in the bed. Best films at present time. FINDINGS: Portable view time stamped at 748 hours demonstrates an intestinal tube terminating in the midportion of a gaseous Spike dilated stomach. Densities are present over the stomach likely medication. Bipolar pacemaker is in situ with intact leads. Heart size is top-normal, stable. Gaseous distension of the stomach and loop of bowel in the upper mid abdomen is noted but there is gas and fecal material in the rectum. Gastric outlet obstruction or proximal small bowel partial obstruction is suspected. No free air is noted.   [place] is present over the pelvis likely a monitor or CT small bore catheter. Vascular calcification is present in the pelvis. Persistent preferential gaseous distension of the stomach although there is some gas in the upper abdomen and gas and fecal material present in the rectosigmoid. Findings suggest gastric outlet obstruction. Ct Abdomen Pelvis W Iv Contrast    Result Date: 4/11/2019  EXAMINATION: CT OF THE ABDOMEN AND PELVIS WITH CONTRAST 4/11/2019 5:14 pm TECHNIQUE: CT of the abdomen and pelvis was performed with the administration of intravenous contrast. Multiplanar reformatted images are provided for review. Dose modulation, iterative reconstruction, and/or weight based adjustment of the mA/kV was utilized to reduce the radiation dose to as low as reasonably achievable. COMPARISON: None. HISTORY: ORDERING SYSTEM PROVIDED HISTORY: Abdominal pain TECHNOLOGIST PROVIDED HISTORY: IV Only Contrast Ordering Physician Provided Reason for Exam: patient c/o abd pain for an hour FINDINGS: Lower Chest: Trace pleural fluid bilaterally is noted. Indwelling cardiac pacemaker is present. Heart size is normal.  Coronary artery calcifications are evident. The esophagus is significantly dilated and fluid-filled. No paraesophageal adenopathy is evident. Organs: The spleen, pancreas, and adrenals are unremarkable. The liver contains hypodensities, likely cysts. The gallbladder is distended and contains a solitary gallstone. The kidneys excrete contrast bilaterally. Extrarenal collecting systems are noted. The ureters are mildly dilated down to the urinary bladder without evidence of intraluminal or ureteral obstructing calculi. GI/Bowel: Marked distention of the stomach is noted; this continues to the pylorus with appearance suggesting partial gastric outlet obstruction. Fluid is present in some small bowel loops and colon distal to the stomach. No small bowel obstruction. Pelvis: Marked distention of the urinary bladder is noted.   There is air in the urinary bladder and bilateral opacities consistent with resolving pulmonary edema. Tubes and lines as above. Xr Chest Portable    Result Date: 4/14/2019  EXAMINATION: SINGLE XRAY VIEW OF THE CHEST 4/14/2019 7:57 am COMPARISON: Portable chest 04/13/2019. HISTORY: ORDERING SYSTEM PROVIDED HISTORY: Intubation TECHNOLOGIST PROVIDED HISTORY: Intubation Ordering Physician Provided Reason for Exam: intubation Acuity: Acute Type of Exam: Initial Additional signs and symptoms: intubation FINDINGS: Endotracheal tube terminates over the midthoracic trachea. Dual-chamber pacemaker leads appear unchanged in position. Right IJ approach central venous catheter unchanged in position. Heart size not substantially changed. Perihilar and basilar opacities further increased. Left pleural effusion increased in size. Findings may reflect pulmonary edema, progressed from yesterday's exam.  Left pleural effusion increased in size. Xr Chest Portable    Result Date: 4/12/2019  EXAMINATION: SINGLE XRAY VIEW OF THE CHEST 4/12/2019 5:26 am COMPARISON: November 14, 2015. HISTORY: ORDERING SYSTEM PROVIDED HISTORY: line placement TECHNOLOGIST PROVIDED HISTORY: line placement Ordering Physician Provided Reason for Exam: New right side line placement. Acuity: Acute Type of Exam: Initial Additional signs and symptoms: New right side line placement. FINDINGS: Stable left pectoral trans venous cardiac pacer device. New right IJ central venous catheter with tip near the superior atrial caval junction. Normal lung volume. No new consolidation. Curvilinear radiopacity projecting over the right upper lobe likely represents artifact from a skin fold. No pleural effusion or pneumothorax. Stable cardiomediastinal silhouette and great vessels with redemonstration of atherosclerotic thoracic aorta. New right IJ central venous catheter with tip near the superior atrial caval junction. No pneumothorax. No new consolidation.                    Thank you for allowing me to participate in the care of your patient. Please feel free to contact me with any questions or concerns.      Mike Durant MD

## 2019-04-19 NOTE — PROGRESS NOTES
[address] [facility].    PROGRESS NOTE             4/19/2019    1:37 PM    Name:   Suad Valentino  MRN:     [id number]     Acct:      [de-identified]   Room:   2002/2002-01  IP Day:  8  Admit Date:  4/11/2019  2:48 PM    PCP:  Mario Laguerre DO  Code Status:  Full Code    Subjective: Interval History Status: improved. Patient seen and examined at bedside in the ICU. Afebrile, VSS. Found to have budding yeast candida species heavy growth in sputum cx. Diflucan started. ID following. Patient appears better today visually. Alert and awake on propofol. Responding to commands with head nodding, and does not appear to be in distress. Intubated. Off of pressors. Cont to be on propofol and /h. TPN day 5 - tolerating tube feeds. Repeat Blood cx -ve @ 13 hours    Trend CRP and procalcitonin. - procal @ 0.10, CRP @ 21.9 (prev 18.7)    CXR stable in interval.     Prealbumin 11.8      Brief History:     Per EMR:     The patient is a 77 y.o.  Female who presents withAbdominal Pain   and she is admitted to the hospital for the management of abdominal distension, nausea, vomiting, and acute cystitis.      Patient presents with chills, nausea, vomiting, abdominal pain (diffuse, but worse in the suprapubic region), abdominal distension, and dysuria of 2-3 days duration. Denies hematemesis. Acknowledges difficulty keeping food down but tolerating fluids. States abdominal pain is a 6/10 on average, and denies trying any medication to alleviate her sx.      Denies recent antibiotic use or steroid use.      ED: Tachycardic 100-114 BP, WBC 47.9 w/neutrophils 88,  UCx from 4/2 + E. Coli > 100,000 w/suceptibility to cipro. Review of Systems:     Review of Systems   Unable to perform ROS: Intubated   Cardiovascular: Chest pain: Left-sided. Medications: Allergies:     Allergies   Allergen Reactions    Penicillins Shortness Of Breath and Rash    Amoxicillin        Current Meds:   Scheduled Meds:    magnesium sulfate  1 g Intravenous Once    metoclopramide  10 mg Intravenous TID    pantoprazole  40 mg Intravenous Daily    And    sodium chloride (PF)  10 mL Intravenous Daily    methylPREDNISolone  40 mg Intravenous Q8H    sodium chloride  250 mL Intravenous Once    vancomycin  1,000 mg Intravenous Q12H    polyvinyl alcohol  1 drop Both Eyes Q4H    And    lubrifresh P.M. Both Eyes Q4H    chlorhexidine  15 mL Mouth/Throat BID    ipratropium  0.5 mg Nebulization Q4H    insulin lispro  0-12 Units Subcutaneous Q6H    levalbuterol  1.25 mg Nebulization Q4H    docusate sodium  100 mg Oral TID    vancomycin (VANCOCIN) intermittent dosing (placeholder)   Other RX Placeholder    levofloxacin  500 mg Intravenous Q24H    venlafaxine  37.5 mg Oral TID    clopidogrel  75 mg Oral Daily    aspirin  81 mg Oral Daily    sodium chloride flush  10 mL Intravenous 2 times per day    enoxaparin  40 mg Subcutaneous Daily     Continuous Infusions:    PN-Adult 2-in-1 Central Line (Standard) 65 mL/hr at 04/18/19 1819    propofol Stopped (04/19/19 1030)    vasopressin (Septic Shock) infusion Stopped (04/14/19 1930)    norepinephrine Stopped (04/16/19 2102)    lactated ringers 125 mL/hr at 04/19/19 0439    dextrose       PRN Meds: fentanNYL, sodium chloride nebulizer, fentanNYL, oxyCODONE-acetaminophen, magnesium sulfate, sodium phosphate IVPB **OR** sodium phosphate IVPB, potassium chloride **OR** potassium alternative oral replacement **OR** potassium chloride, glucose, dextrose, glucagon (rDNA), dextrose, milk and molasses, sodium chloride flush, acetaminophen    Data:     Past Medical History:   has a past medical history of Abnormal computed tomography of cervical spine, CVA (cerebral vascular accident) ([address] GERD (gastroesophageal reflux disease), Hypertension, Paraproteinemia, and Weight loss.     Social History:   reports that she has been smoking. She has a 30.00 pack-year smoking history. She has never used smokeless tobacco. She reports that she drank about 16.8 oz of alcohol per week. She reports that she does not use drugs. Family History: History reviewed. No pertinent family history. Vitals:  /63   Pulse 103   Temp 98.7 °F (37.1 °C) (Axillary)   Resp 13   Ht 5' (1.524 m)   Wt 144 lb 13.5 oz (65.7 kg)   SpO2 97%   BMI 28.29 kg/m²   Temp (24hrs), Av.5 °F (36.9 °C), Min:97.6 °F (36.4 °C), Max:99.5 °F (37.5 °C)    Recent Labs     19  0110 19  0744 19  0901 19  1152   POCGLU 135* 134* 127* 155*       I/O(24Hr): Intake/Output Summary (Last 24 hours) at 2019 1337  Last data filed at 2019 1200  Gross per 24 hour   Intake 6695.9 ml   Output 7053 ml   Net -357.1 ml       Labs:    [unfilled]    Lab Results   Component Value Date/Time    SPECIAL  line 5cc red 5cc purple 2019 10:20 AM     Lab Results   Component Value Date/Time    CULTURE NO GROWTH 16 HOURS 2019 10:20 AM       [unfilled]    Radiology:    Xr Femur Left (min 2 Views)    Result Date: 3/27/2019  EXAMINATION: 4 XRAY VIEWS OF THE LEFT FEMUR; 2 XRAY VIEWS OF THE LEFT KNEE; 3 XRAY VIEWS OF THE LEFT TIBIA AND FIBULA 3/27/2019 7:00 pm COMPARISON: Left hip 2015. HISTORY: ORDERING SYSTEM PROVIDED HISTORY: pain TECHNOLOGIST PROVIDED HISTORY: pain Ordering Physician Provided Reason for Exam: pt twisted wrong, lt knee pain, unable to straighten knee. Acuity: Acute Type of Exam: Initial FINDINGS: Left femur, four views: The bones are diffusely osteopenic. No acute fracture deformity. The hip joint is maintained. The femoral head projects within the acetabulum. No focal soft tissue abnormality is seen. Left knee, two views: The knee is flexed. No acute osseous abnormality. No joint effusion. Joint spaces are not optimally profiled but no significant arthritic changes are apparent.  Left tib-fib, three views: as low as reasonably achievable. COMPARISON: None. HISTORY: ORDERING SYSTEM PROVIDED HISTORY: Abdominal pain TECHNOLOGIST PROVIDED HISTORY: IV Only Contrast Ordering Physician Provided Reason for Exam: patient c/o abd pain for an hour FINDINGS: Lower Chest: Trace pleural fluid bilaterally is noted. Indwelling cardiac pacemaker is present. Heart size is normal.  Coronary artery calcifications are evident. The esophagus is significantly dilated and fluid-filled. No paraesophageal adenopathy is evident. Organs: The spleen, pancreas, and adrenals are unremarkable. The liver contains hypodensities, likely cysts. The gallbladder is distended and contains a solitary gallstone. The kidneys excrete contrast bilaterally. Extrarenal collecting systems are noted. The ureters are mildly dilated down to the urinary bladder without evidence of intraluminal or ureteral obstructing calculi. GI/Bowel: Marked distention of the stomach is noted; this continues to the pylorus with appearance suggesting partial gastric outlet obstruction. Fluid is present in some small bowel loops and colon distal to the stomach. No small bowel obstruction. Pelvis: Marked distention of the urinary bladder is noted. 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 Distended ureters may be related to reflux or infection. No free pelvic fluid, pelvic or inguinal adenopathy is noted. Peritoneum/Retroperitoneum: No aortic aneurysm. Mild to moderate plaque is noted in the infrarenal abdominal aorta and both proximal iliac arteries. Shotty lymph nodes are present around the aorta in the upper abdomen. No mesenteric adenopathy is noted. Bones/Soft Tissues: Degenerative changes are present in the hips and lower lumbar facets. Estimated biologic radiation dose for this procedure:258.77 mGy/cm2.     1. Dilatation of the thoracic esophagus filled with fluid. No obstructive process is noted.   No adjacent enlarged lymph nodes. 2. Trace pleural fluid. 3. Marked distention of the stomach. This appears to extend to the pylorus. Partial gastric outlet obstruction is not excluded. 4. Bilateral dilated ureters without obstructing calculi. Urinary bladder is markedly distended with bladder wall air suggesting emphysematous cystitis. Dilatation of the ureters may be related to reflux or infection. 5. Atherosclerotic disease. 6. Other findings as above. Critical results were called by Dr. Cornel Francisco MD to [address] on 4/11/2019 at 17:37. Xr Chest Portable    Result Date: 4/12/2019  EXAMINATION: SINGLE XRAY VIEW OF THE CHEST 4/12/2019 5:26 am COMPARISON: November 14, 2015. HISTORY: ORDERING SYSTEM PROVIDED HISTORY: line placement TECHNOLOGIST PROVIDED HISTORY: line placement Ordering Physician Provided Reason for Exam: New right side line placement. Acuity: Acute Type of Exam: Initial Additional signs and symptoms: New right side line placement. FINDINGS: Stable left pectoral trans venous cardiac pacer device. New right IJ central venous catheter with tip near the superior atrial caval junction. Normal lung volume. No new consolidation. Curvilinear radiopacity projecting over the right upper lobe likely represents artifact from a skin fold. No pleural effusion or pneumothorax. Stable cardiomediastinal silhouette and great vessels with redemonstration of atherosclerotic thoracic aorta. New right IJ central venous catheter with tip near the superior atrial caval junction. No pneumothorax. No new consolidation. Physical Examination:        Physical Exam   Constitutional: She appears well-developed. She appears cachectic. She appears ill. No distress. Intubated   HENT:   Head: Normocephalic and atraumatic. Eyes: Pupils are equal, round, and reactive to light. Conjunctivae and EOM are normal.   Neck: Normal range of motion. Right IJ central line in place. Site clean and dry.    Cardiovascular: Normal rate, regular rhythm, normal heart sounds and intact distal pulses. Exam reveals no gallop and no friction rub. No murmur heard. Pulmonary/Chest: Effort normal. No stridor. No respiratory distress. She has no wheezes. She has no rales. Intubated and mechanically ventilated. Abdominal: Soft. Bowel sounds are normal. She exhibits no mass. There is no tenderness (Decreased tenderness to light palpation. Improvement from prior. ). There is no rebound and no guarding. Musculoskeletal: Normal range of motion. She exhibits no edema (Anasarca; 3+ b/l pitting edema in upper and lower ext. ). Left knee wrapped in dressing. Posterior bruising noted. Neurological:   Intubated, sedated   Skin: Skin is warm and dry. Capillary refill takes less than 2 seconds. She is not diaphoretic. No pallor. Nursing note and vitals reviewed. 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 Coli Emphysematous Cystitis + MRSA PNA   WBC 17-->22.4-->27.5   Levaquin, Vanc   CXR multifocal airspace disease, left basilar consolidation/effusion   Off of levophed at this time   ECHO 4/12: LVEF 45%, RVSP 36 mmHg   Urology consult, appreciate recs --> cont cath until out of ICU and stable   Critical care consult, appreciate recs   Gen Surg, right IJ central line placed on 4/12   ID consult, appreciate recs   Resp Cx: mod budding yeast --> started diflucan  loading dose, then 400 qD thereafter   Off of pressors. Cont to be on propofol and /h.  MAP <65 last few hours, upper quadrant. At present she is on full antibiotic coverage.   I will leave up to surgical service to decide if there is need for HIDA scan are not  Electronically signed by Verner Lynch, MD on 4/19/2019 at 1:38 PM         Electronically signed by Verner Lynch, MD on 4/19/2019 at 1:37 PM

## 2019-04-19 NOTE — CARE COORDINATION
ONGOING DISCHARGE PLAN:    Plan remains for LSW to continue to follow for possible return to SNF, [facility]. Will follow PT/OT rec. Pt. Was extubated this am. Pulmonary on board. Remains on IV Levaquin/Vanco & steroids, 40 mg, Q8.     TPN continues at this time. Will continue to follow for additional discharge needs.     Electronically signed by Piper Parekh RN on 4/19/2019 at 4:03 PM

## 2019-04-19 NOTE — PROGRESS NOTES
Order obtain for extubation. SpO2 of 98 on 30% FiO2. Patient extubated and placed on 3 liters/min via nasal cannula. Post extubation SpO2 is 97% with HR  104 bpm and RR 20 breaths/min. Patient had moderate cough that was non-productive. Extubation Well tolerated by patient. .   Breath Sounds: diminished    REY Parikh   11:04 AM

## 2019-04-19 NOTE — FLOWSHEET NOTE
04/19/19 1951   Provider Notification   Reason for Communication Review case   Provider Name Dr. Alexis Johnson   Provider Notification Physician   Method of Communication Call   Response See orders   Dr. Clara Don called back about pts scan earlier. Made HIDA stat for tomorrow a.m.

## 2019-04-19 NOTE — PROGRESS NOTES
Physical Therapy  Facility/Department: [facility] ICU  Daily Treatment Note  NAME: Angelika Jay  : 1950  MRN: [id number]    Date of Service: 2019    Discharge Recommendations:  ECF with PT   PT Equipment Recommendations  Equipment Needed: No    Patient Diagnosis(es): The primary encounter diagnosis was Urinary retention. Diagnoses of Emphysematous cystitis and Septicemia ([facility] [address] were also pertinent to this visit. has a past medical history of Abnormal computed tomography of cervical spine, CVA (cerebral vascular accident) ([address] GERD (gastroesophageal reflux disease), Hypertension, Paraproteinemia, and Weight loss. has a past surgical history that includes pacemaker placement (2011); intubation (2019); and Upper gastrointestinal endoscopy (N/A, 2019).     Restrictions  Restrictions/Precautions  Restrictions/Precautions: Fall Risk  Implants present? : Pacemaker  Position Activity Restriction  Other position/activity restrictions: LLE in a long leg cast  Subjective   General  Additional Pertinent Hx: admitted 19 due to abdominal pain  Subjective  Subjective: pt c/o \"pain all over\"  General Comment  Comments: pt extubated earlier today- ok for activity in bed only per RN  Pain Screening  Patient Currently in Pain: Yes  Pain Assessment  Pain Level: 8  Pain Location: Generalized  Vital Signs  Patient Currently in Pain: Yes          Objective          Exercises  Comments: pt limited ROM to all exrtremities(especially UEs) due to c/o pain  Other exercises  Other exercises 1: PROM to B UE ; R LE and L hip- 4-5 reps each          Assessment   Assessment: pain limiting ROM exercises  Specific instructions for Next Treatment: progress to mobility as able  REQUIRES PT FOLLOW UP: Yes  Activity Tolerance  Activity Tolerance: Patient limited by pain     Goals  Short term goals  Time Frame for Short term goals: 10 visits  Short term goal 1: improve strength RUE/RLE to 4/5 to assist in bed mobility and transfers  Short term goal 2: improve bed mobility to minx1  Short term goal 3: improve transfers to minx1  Short term goal 4: progress to Gait and/or use of wheelchair for mobility  Patient Goals   Patient goals : return home    Plan    Plan  Times per week: 5-7x/wk  Times per day: Daily  Specific instructions for Next Treatment: progress to mobility as able  Current Treatment Recommendations: ROM, Strengthening, Functional Mobility Training, Transfer Training  Plan Comment: to continue PT per POC  Safety Devices  Type of devices: Call light within reach, Left in bed  Restraints  Initially in place: Yes  Restraints: B wrist restraint     Therapy Time   Individual Concurrent Group Co-treatment   Time In 1450         Time Out 1505         Minutes [facility], PT

## 2019-04-19 NOTE — FLOWSHEET NOTE
04/19/19 4376   Provider Notification   Reason for Communication Evaluate  (ABG's)   Provider Name [facility]   Provider Notification Physician   Method of Communication Call   Response No new orders   Notification Time [phone]   Notified  [facility] of ABG's. No new orders at this time.

## 2019-04-19 NOTE — PROGRESS NOTES
ICU Progress Note (Vent)   Pulmonary and Critical Care Specialists    Patient - Ayse Slaughter,  Age - 77 y.o.    - 1950      Room Number -    N -  [id number]   [facility]  Date of Admission -  2019  2:48 PM    Events of Past 24 Hours    Patient appears to be alert and following commands. No acute distress. Actually on a weaning trial for about an hour and a half. She appears to be comfortable. Vitals    height is 5' (1.524 m) and weight is 144 lb 13.5 oz (65.7 kg). Her oral temperature is 98.9 °F (37.2 °C). Her blood pressure is 135/53 (abnormal) and her pulse is 109. Her respiration is 16 and oxygen saturation is 96%. Temperature Range: Temp: 98.9 °F (37.2 °C) Temp  Av.5 °F (36.9 °C)  Min: 97.6 °F (36.4 °C)  Max: 99.5 °F (37.5 °C)  BP Range:  Systolic (57REU), WTX:666 , Min:83 , JLF:856     Diastolic (58PKC), CVT:94, Min:39, Max:82    Pulse Range: Pulse  Av  Min: 85  Max: 129  Respiration Range: Resp  Av.9  Min: 14  Max: 35  Current Pulse Ox[de-identified]  SpO2: 96 %  24HR Pulse Ox Range:  SpO2  Av.9 %  Min: 92 %  Max: 100 %  Oxygen Amount and Delivery: O2 Flow Rate (L/min): 15 L/min      Wt Readings from Last 3 Encounters:   19 144 lb 13.5 oz (65.7 kg)   19 89 lb (40.4 kg)   19 94 lb 1.6 oz (42.7 kg)     I/O       Intake/Output Summary (Last 24 hours) at 2019 1058  Last data filed at 2019 0800  Gross per 24 hour   Intake 6295.9 ml   Output 7053 ml   Net -757.1 ml     I/O last 3 completed shifts:   In: 6095.9 [I.V.:4292.2; NG/GT:1000]  Out: 7053 [Urine:5123; Emesis/NG output:193]     DRAIN/TUBE OUTPUT:     Invasive Lines   ETT Day -   5  Right IJ day 6    Mechanical Ventilation Data   SETTINGS (Comprehensive)  Vent Information  $Ventilation: $Subsequent Day  Ventilator Started: Yes  Ventilation Day(s): 6  Equipment ID: [id number]  Equipment Changed: HME  Vent Type: Servo i  Vent Mode: PRVC  Vt Ordered: 360 mL  Rate Set: 18 Central Line (Standard)    Exam   VITALS    height is 5' (1.524 m) and weight is 144 lb 13.5 oz (65.7 kg). Her oral temperature is 98.9 °F (37.2 °C). Her blood pressure is 135/53 (abnormal) and her pulse is 109. Her respiration is 16 and oxygen saturation is 96%. Ventilator Settings (Basic)  Vent Mode: PRVC Rate Set: 18 bmp/Vt Ordered: 360 mL/ /FiO2 : 30 %    Constitutional - Sedated  General Appearance  well developed, well nourished  HEENT - Life support devices in place (ET,),normocephalic, atraumatic. Lungs - Chest expands equally, no wheezes, rales or rhonchi. Cardiovascular - Heart sounds are normal.  normal rate and rhythm regular, no murmur, gallop or rub.   Abdomen - soft, nontender, nondistended  Extremities - no cyanosis, or extremity cast intact    Lab Results   CBC     Lab Results   Component Value Date    WBC 27.5 04/19/2019    RBC 3.64 04/19/2019    RBC 3.66 05/23/2012    HGB 11.0 04/19/2019    HCT 33.2 04/19/2019     04/19/2019     05/23/2012    MCV 91.1 04/19/2019    MCH 30.2 04/19/2019    MCHC 33.1 04/19/2019    RDW 14.6 04/19/2019    METASPCT 2 04/11/2019    LYMPHOPCT 4 04/11/2019    MONOPCT 2 04/11/2019    BASOPCT 0 04/11/2019    MONOSABS 0.96 04/11/2019    LYMPHSABS 1.92 04/11/2019    EOSABS 0.00 04/11/2019    BASOSABS 0.00 04/11/2019    DIFFTYPE NOT REPORTED 04/11/2019       BMP   Lab Results   Component Value Date     04/19/2019    K 4.6 04/19/2019    CL 99 04/19/2019    CO2 31 04/19/2019    BUN 15 04/19/2019    CREATININE <0.40 04/19/2019    GLUCOSE 141 04/19/2019    GLUCOSE 87 05/21/2012    CALCIUM 7.6 04/19/2019       LFTS  Lab Results   Component Value Date    ALKPHOS 106 04/19/2019    ALT 17 04/19/2019    AST 30 04/19/2019    PROT 4.6 04/19/2019    BILITOT 0.51 04/19/2019    BILIDIR 0.20 04/18/2019    IBILI 0.26 04/18/2019    LABALBU 2.3 04/19/2019    LABALBU 4.6 05/17/2012       INR  Recent Labs     04/17/19  0435 04/18/19  0451 04/19/19  0341   PROTIME 13.6 13.3 14.4   INR 1.0 1.0 1.1       APTT  No results for input(s): APTT in the last 72 hours. Lactic Acid  Lab Results   Component Value Date    LACTA 2.1 04/14/2019    LACTA 1.5 04/12/2019    LACTA 1.9 04/12/2019        BNP   No results for input(s): BNP in the last 72 hours. Cultures   April 18, 2019 blood cultures ×2 negative to date    Radiology     Plain Films       Chest x-ray reviewed. Bilateral pleural effusions  CT Scans    See actual reports for details    SYSTEM ASSESSMENT  Acute respiratory failure with hypoxemia  Sepsis  ESBL  Anemia  COPD        Neuro       Respiratory   Wean oxygen as tolerated. Keep O2 sat 90-92%    Hemodynamics       Gastrointestinal/Nutrition       Renal       Infectious Disease       Hematology/Oncology       Endocrine       Social/Spiritual/DNR/Disposition/Other     Continue IV vancomycin and IV Levaquin  Off antifungal agents for now  On Xopenex and Atrovent  On Levaquin, and vancomycin  Patient appears stable. Tolerating weaning trial.  Hope to extubate today.     Critical Care Time   0 min    Electronically signed by Steven Nettles MD on 4/19/2019 at 10:58 AM

## 2019-04-19 NOTE — PLAN OF CARE
Problem: Falls - Risk of:  Goal: Absence of physical injury  Description  Absence of physical injury  Pt remains free from falls. Bed alarm on. Call light within reach. 4/18/2019 1809 by Jose Antonio Heller RN  Outcome: Met This Shift     Problem: Risk for Impaired Skin Integrity  Goal: Tissue integrity - skin and mucous membranes  Description  Structural intactness and normal physiological function of skin and  mucous membranes. Patient turned q 2 hours when in bed. Pressure redistribution device(s) in use. Barrier cream applied when providing kimmy care. 4/19/2019 0554 by Anahy Pearce RN  Outcome: Ongoing  4/18/2019 1809 by Jose Antonio Heller RN  Outcome: Ongoing     Problem: Falls - Risk of:  Goal: Will remain free from falls  Description  Will remain free from falls  4/19/2019 0554 by Anahy Pearce RN  Outcome: Ongoing  4/18/2019 1809 by Jose Antonio Heller RN  Outcome: Met This Shift     Problem: Infection, Septic Shock:  Goal: Will show no infection signs and symptoms  Description  Will show no infection signs and symptoms  4/19/2019 0554 by Anahy Pearce RN  Outcome: Ongoing  4/18/2019 1809 by Jose Antonio Heller RN  Outcome: Ongoing     Problem: Tissue Perfusion, Altered:  Goal: Circulatory function within specified parameters  Description  Circulatory function within specified parameters  4/18/2019 1809 by Jose Antonio Heller RN  Outcome: Ongoing     Problem: Pain:  Goal: Pain level will decrease  Description  Pain level will decrease  Pt's pain assessed using the CPOT scale. Pt medicated with Fentanyl as needed.    4/19/2019 0554 by Anahy Pearce RN  Outcome: Ongoing  4/18/2019 1809 by Jose Antonio Heller RN  Outcome: Ongoing  Goal: Control of acute pain  Description  Control of acute pain  4/18/2019 1809 by Jose Antonio Heller RN  Outcome: Ongoing  Goal: Control of chronic pain  Description  Control of chronic pain  4/18/2019 1809 by Jose Antonio Heller RN  Outcome: Ongoing     Problem: Nutrition  Goal: Optimal nutrition therapy  Description  Pt receiving TPN nutrition. 4/19/2019 0554 by Venkata Story RN  Outcome: Ongoing  4/18/2019 1809 by Reny Dimas RN  Outcome: Ongoing  4/18/2019 1726 by Abdias Melgoza RD, LD  Outcome: Ongoing     Problem: Restraint Use - Nonviolent/Non-Self-Destructive Behavior:  Goal: Absence of restraint indications  Description  Absence of restraint indications  Pt in wrist restraints. Pt attempts to pull out ET tube when released for ROM.      4/19/2019 0554 by Venkata Story RN  Outcome: Ongoing  4/18/2019 1809 by Reny Dimas RN  Outcome: Ongoing  Goal: Absence of restraint-related injury  Description  Absence of restraint-related injury  4/18/2019 1811 by Reny Dimas RN  Note:   Patient reaches for eet tube with turns, restraints maintained to protect airway    4/18/2019 1809 by Reny Dimas RN  Outcome: Ongoing

## 2019-04-19 NOTE — PROGRESS NOTES
Tran changed out from a coude to a regular non latex 16 Mongolian catheter. Pt tolerated well. Urine retrieved and sent for culture.

## 2019-04-20 ENCOUNTER — APPOINTMENT (OUTPATIENT)
Dept: NUCLEAR MEDICINE | Age: 69
DRG: 853 | End: 2019-04-20
Payer: COMMERCIAL

## 2019-04-20 LAB
ALBUMIN SERPL-MCNC: 2.5 G/DL (ref 3.5–5.2)
ALBUMIN/GLOBULIN RATIO: ABNORMAL (ref 1–2.5)
ALP BLD-CCNC: 132 U/L (ref 35–104)
ALT SERPL-CCNC: 24 U/L (ref 5–33)
AMYLASE: 267 U/L (ref 28–100)
ANION GAP SERPL CALCULATED.3IONS-SCNC: 10 MMOL/L (ref 9–17)
AST SERPL-CCNC: 41 U/L
BILIRUB SERPL-MCNC: 0.62 MG/DL (ref 0.3–1.2)
BNP INTERPRETATION: ABNORMAL
BUN BLDV-MCNC: 16 MG/DL (ref 8–23)
BUN/CREAT BLD: ABNORMAL (ref 9–20)
CALCIUM SERPL-MCNC: 8.2 MG/DL (ref 8.6–10.4)
CHLORIDE BLD-SCNC: 99 MMOL/L (ref 98–107)
CO2: 30 MMOL/L (ref 20–31)
CREAT SERPL-MCNC: <0.4 MG/DL (ref 0.5–0.9)
GFR AFRICAN AMERICAN: ABNORMAL ML/MIN
GFR NON-AFRICAN AMERICAN: ABNORMAL ML/MIN
GFR SERPL CREATININE-BSD FRML MDRD: ABNORMAL ML/MIN/{1.73_M2}
GFR SERPL CREATININE-BSD FRML MDRD: ABNORMAL ML/MIN/{1.73_M2}
GLUCOSE BLD-MCNC: 126 MG/DL (ref 65–105)
GLUCOSE BLD-MCNC: 128 MG/DL (ref 65–105)
GLUCOSE BLD-MCNC: 139 MG/DL (ref 70–99)
GLUCOSE BLD-MCNC: 159 MG/DL (ref 65–105)
GLUCOSE BLD-MCNC: 181 MG/DL (ref 65–105)
HCT VFR BLD CALC: 35.1 % (ref 36–46)
HCT VFR BLD CALC: 38.5 % (ref 36–46)
HCT VFR BLD CALC: 39.5 % (ref 36–46)
HEMOGLOBIN: 11.5 G/DL (ref 12–16)
HEMOGLOBIN: 12.6 G/DL (ref 12–16)
HEMOGLOBIN: 12.8 G/DL (ref 12–16)
INR BLD: 1.1
LIPASE: 60 U/L (ref 13–60)
MCH RBC QN AUTO: 29.8 PG (ref 26–34)
MCHC RBC AUTO-ENTMCNC: 32.4 G/DL (ref 31–37)
MCV RBC AUTO: 91.9 FL (ref 80–100)
NRBC AUTOMATED: ABNORMAL PER 100 WBC
PDW BLD-RTO: 15.2 % (ref 11.5–14.9)
PLATELET # BLD: 273 K/UL (ref 150–450)
PMV BLD AUTO: 8.9 FL (ref 6–12)
POTASSIUM SERPL-SCNC: 4.4 MMOL/L (ref 3.7–5.3)
PRO-BNP: 1628 PG/ML
PROTHROMBIN TIME: 13.9 SEC (ref 11.8–14.6)
RBC # BLD: 4.3 M/UL (ref 4–5.2)
SODIUM BLD-SCNC: 139 MMOL/L (ref 135–144)
TOTAL PROTEIN: 5.2 G/DL (ref 6.4–8.3)
WBC # BLD: 31.8 K/UL (ref 3.5–11)

## 2019-04-20 PROCEDURE — 36592 COLLECT BLOOD FROM PICC: CPT

## 2019-04-20 PROCEDURE — 2500000003 HC RX 250 WO HCPCS: Performed by: STUDENT IN AN ORGANIZED HEALTH CARE EDUCATION/TRAINING PROGRAM

## 2019-04-20 PROCEDURE — 2580000003 HC RX 258: Performed by: INTERNAL MEDICINE

## 2019-04-20 PROCEDURE — 85027 COMPLETE CBC AUTOMATED: CPT

## 2019-04-20 PROCEDURE — 2000000000 HC ICU R&B

## 2019-04-20 PROCEDURE — 2580000003 HC RX 258: Performed by: SURGERY

## 2019-04-20 PROCEDURE — 94640 AIRWAY INHALATION TREATMENT: CPT

## 2019-04-20 PROCEDURE — A9537 TC99M MEBROFENIN: HCPCS | Performed by: SURGERY

## 2019-04-20 PROCEDURE — 6360000002 HC RX W HCPCS: Performed by: INTERNAL MEDICINE

## 2019-04-20 PROCEDURE — 99233 SBSQ HOSP IP/OBS HIGH 50: CPT | Performed by: INTERNAL MEDICINE

## 2019-04-20 PROCEDURE — 3430000000 HC RX DIAGNOSTIC RADIOPHARMACEUTICAL: Performed by: SURGERY

## 2019-04-20 PROCEDURE — 99291 CRITICAL CARE FIRST HOUR: CPT | Performed by: INTERNAL MEDICINE

## 2019-04-20 PROCEDURE — 2500000003 HC RX 250 WO HCPCS: Performed by: INTERNAL MEDICINE

## 2019-04-20 PROCEDURE — 82150 ASSAY OF AMYLASE: CPT

## 2019-04-20 PROCEDURE — APPNB30 APP NON BILLABLE TIME 0-30 MINS: Performed by: NURSE PRACTITIONER

## 2019-04-20 PROCEDURE — 93005 ELECTROCARDIOGRAM TRACING: CPT

## 2019-04-20 PROCEDURE — 83880 ASSAY OF NATRIURETIC PEPTIDE: CPT

## 2019-04-20 PROCEDURE — 82947 ASSAY GLUCOSE BLOOD QUANT: CPT

## 2019-04-20 PROCEDURE — 85018 HEMOGLOBIN: CPT

## 2019-04-20 PROCEDURE — 85610 PROTHROMBIN TIME: CPT

## 2019-04-20 PROCEDURE — 83690 ASSAY OF LIPASE: CPT

## 2019-04-20 PROCEDURE — 80053 COMPREHEN METABOLIC PANEL: CPT

## 2019-04-20 PROCEDURE — 2700000000 HC OXYGEN THERAPY PER DAY

## 2019-04-20 PROCEDURE — 85014 HEMATOCRIT: CPT

## 2019-04-20 PROCEDURE — C9113 INJ PANTOPRAZOLE SODIUM, VIA: HCPCS | Performed by: INTERNAL MEDICINE

## 2019-04-20 PROCEDURE — 6370000000 HC RX 637 (ALT 250 FOR IP): Performed by: INTERNAL MEDICINE

## 2019-04-20 PROCEDURE — 36415 COLL VENOUS BLD VENIPUNCTURE: CPT

## 2019-04-20 PROCEDURE — 94762 N-INVAS EAR/PLS OXIMTRY CONT: CPT

## 2019-04-20 PROCEDURE — 78227 HEPATOBIL SYST IMAGE W/DRUG: CPT

## 2019-04-20 RX ORDER — DEXTROSE MONOHYDRATE 100 MG/ML
INJECTION, SOLUTION INTRAVENOUS CONTINUOUS
Status: DISCONTINUED | OUTPATIENT
Start: 2019-04-20 | End: 2019-04-21

## 2019-04-20 RX ORDER — METOPROLOL TARTRATE 5 MG/5ML
5 INJECTION INTRAVENOUS EVERY 4 HOURS PRN
Status: DISCONTINUED | OUTPATIENT
Start: 2019-04-20 | End: 2019-04-20

## 2019-04-20 RX ORDER — METOPROLOL TARTRATE 5 MG/5ML
10 INJECTION INTRAVENOUS EVERY 4 HOURS PRN
Status: DISCONTINUED | OUTPATIENT
Start: 2019-04-20 | End: 2019-05-19

## 2019-04-20 RX ORDER — METOPROLOL TARTRATE 5 MG/5ML
5 INJECTION INTRAVENOUS EVERY 6 HOURS PRN
Status: DISCONTINUED | OUTPATIENT
Start: 2019-04-20 | End: 2019-04-20

## 2019-04-20 RX ORDER — METHYLPREDNISOLONE SODIUM SUCCINATE 40 MG/ML
30 INJECTION, POWDER, LYOPHILIZED, FOR SOLUTION INTRAMUSCULAR; INTRAVENOUS EVERY 8 HOURS
Status: DISCONTINUED | OUTPATIENT
Start: 2019-04-20 | End: 2019-04-22

## 2019-04-20 RX ORDER — SODIUM CHLORIDE 0.9 % (FLUSH) 0.9 %
10 SYRINGE (ML) INJECTION PRN
Status: DISCONTINUED | OUTPATIENT
Start: 2019-04-20 | End: 2019-04-26

## 2019-04-20 RX ADMIN — DEXTROSE MONOHYDRATE: 100 INJECTION, SOLUTION INTRAVENOUS at 08:15

## 2019-04-20 RX ADMIN — VANCOMYCIN HYDROCHLORIDE 1000 MG: 1 INJECTION, POWDER, LYOPHILIZED, FOR SOLUTION INTRAVENOUS at 13:45

## 2019-04-20 RX ADMIN — Medication 10 ML: at 16:22

## 2019-04-20 RX ADMIN — METHYLPREDNISOLONE SODIUM SUCCINATE 40 MG: 40 INJECTION, POWDER, FOR SOLUTION INTRAMUSCULAR; INTRAVENOUS at 12:01

## 2019-04-20 RX ADMIN — IPRATROPIUM BROMIDE 0.5 MG: 0.5 SOLUTION RESPIRATORY (INHALATION) at 23:11

## 2019-04-20 RX ADMIN — INSULIN LISPRO 2 UNITS: 100 INJECTION, SOLUTION INTRAVENOUS; SUBCUTANEOUS at 03:04

## 2019-04-20 RX ADMIN — Medication 10 ML: at 13:40

## 2019-04-20 RX ADMIN — METOPROLOL TARTRATE 5 MG: 1 INJECTION, SOLUTION INTRAVENOUS at 16:09

## 2019-04-20 RX ADMIN — METOPROLOL TARTRATE 5 MG: 1 INJECTION, SOLUTION INTRAVENOUS at 04:06

## 2019-04-20 RX ADMIN — IPRATROPIUM BROMIDE 0.5 MG: 0.5 SOLUTION RESPIRATORY (INHALATION) at 08:33

## 2019-04-20 RX ADMIN — SODIUM CHLORIDE, POTASSIUM CHLORIDE, SODIUM LACTATE AND CALCIUM CHLORIDE: 600; 310; 30; 20 INJECTION, SOLUTION INTRAVENOUS at 07:25

## 2019-04-20 RX ADMIN — POLYVINYL ALCOHOL 1 DROP: 14 SOLUTION/ DROPS OPHTHALMIC at 07:30

## 2019-04-20 RX ADMIN — VENLAFAXINE 37.5 MG: 37.5 TABLET ORAL at 22:18

## 2019-04-20 RX ADMIN — LEVALBUTEROL 1.25 MG: 1.25 SOLUTION, CONCENTRATE RESPIRATORY (INHALATION) at 00:00

## 2019-04-20 RX ADMIN — AMIODARONE HYDROCHLORIDE 0.5 MG/MIN: 1.8 INJECTION, SOLUTION INTRAVENOUS at 20:14

## 2019-04-20 RX ADMIN — ENOXAPARIN SODIUM 40 MG: 100 INJECTION SUBCUTANEOUS at 11:59

## 2019-04-20 RX ADMIN — LEVOFLOXACIN 500 MG: 5 INJECTION, SOLUTION INTRAVENOUS at 13:48

## 2019-04-20 RX ADMIN — METRONIDAZOLE 500 MG: 500 INJECTION, SOLUTION INTRAVENOUS at 21:52

## 2019-04-20 RX ADMIN — METOPROLOL TARTRATE 5 MG: 1 INJECTION, SOLUTION INTRAVENOUS at 11:54

## 2019-04-20 RX ADMIN — POLYVINYL ALCOHOL 1 DROP: 14 SOLUTION/ DROPS OPHTHALMIC at 22:12

## 2019-04-20 RX ADMIN — METHYLPREDNISOLONE SODIUM SUCCINATE 30 MG: 40 INJECTION, POWDER, FOR SOLUTION INTRAMUSCULAR; INTRAVENOUS at 21:52

## 2019-04-20 RX ADMIN — LEVALBUTEROL 1.25 MG: 1.25 SOLUTION, CONCENTRATE RESPIRATORY (INHALATION) at 08:33

## 2019-04-20 RX ADMIN — CALCIUM GLUCONATE: 94 INJECTION, SOLUTION INTRAVENOUS at 19:53

## 2019-04-20 RX ADMIN — Medication 10 ML: at 16:23

## 2019-04-20 RX ADMIN — Medication 10 ML: at 09:00

## 2019-04-20 RX ADMIN — VANCOMYCIN HYDROCHLORIDE 1000 MG: 1 INJECTION, POWDER, LYOPHILIZED, FOR SOLUTION INTRAVENOUS at 01:43

## 2019-04-20 RX ADMIN — IPRATROPIUM BROMIDE 0.5 MG: 0.5 SOLUTION RESPIRATORY (INHALATION) at 19:28

## 2019-04-20 RX ADMIN — Medication 5.6 MILLICURIE: at 13:40

## 2019-04-20 RX ADMIN — LEVALBUTEROL 1.25 MG: 1.25 SOLUTION, CONCENTRATE RESPIRATORY (INHALATION) at 19:28

## 2019-04-20 RX ADMIN — FENTANYL CITRATE 50 MCG: 50 INJECTION INTRAMUSCULAR; INTRAVENOUS at 01:50

## 2019-04-20 RX ADMIN — IPRATROPIUM BROMIDE 0.5 MG: 0.5 SOLUTION RESPIRATORY (INHALATION) at 16:30

## 2019-04-20 RX ADMIN — INSULIN LISPRO 2 UNITS: 100 INJECTION, SOLUTION INTRAVENOUS; SUBCUTANEOUS at 22:11

## 2019-04-20 RX ADMIN — LEVALBUTEROL 1.25 MG: 1.25 SOLUTION, CONCENTRATE RESPIRATORY (INHALATION) at 16:30

## 2019-04-20 RX ADMIN — Medication 10 ML: at 11:56

## 2019-04-20 RX ADMIN — I.V. FAT EMULSION 100 ML: 20 EMULSION INTRAVENOUS at 19:52

## 2019-04-20 RX ADMIN — METHYLPREDNISOLONE SODIUM SUCCINATE 40 MG: 40 INJECTION, POWDER, FOR SOLUTION INTRAMUSCULAR; INTRAVENOUS at 03:04

## 2019-04-20 RX ADMIN — IPRATROPIUM BROMIDE 0.5 MG: 0.5 SOLUTION RESPIRATORY (INHALATION) at 00:00

## 2019-04-20 RX ADMIN — PANTOPRAZOLE SODIUM 40 MG: 40 INJECTION, POWDER, FOR SOLUTION INTRAVENOUS at 11:56

## 2019-04-20 RX ADMIN — FENTANYL CITRATE 50 MCG: 50 INJECTION INTRAMUSCULAR; INTRAVENOUS at 06:05

## 2019-04-20 RX ADMIN — CHLORHEXIDINE GLUCONATE 0.12% ORAL RINSE 15 ML: 1.2 LIQUID ORAL at 22:16

## 2019-04-20 RX ADMIN — LEVALBUTEROL 1.25 MG: 1.25 SOLUTION, CONCENTRATE RESPIRATORY (INHALATION) at 23:11

## 2019-04-20 ASSESSMENT — ENCOUNTER SYMPTOMS
STRIDOR: 0
EYE REDNESS: 0
NAUSEA: 0
EYE DISCHARGE: 0
APNEA: 0
WHEEZING: 0
VOMITING: 0
CHEST TIGHTNESS: 0
SHORTNESS OF BREATH: 0
SORE THROAT: 0
DIARRHEA: 0
CONSTIPATION: 0
ABDOMINAL PAIN: 1
ABDOMINAL DISTENTION: 0
COUGH: 0

## 2019-04-20 ASSESSMENT — PAIN DESCRIPTION - PAIN TYPE: TYPE: ACUTE PAIN

## 2019-04-20 ASSESSMENT — PAIN DESCRIPTION - LOCATION: LOCATION: LEG

## 2019-04-20 ASSESSMENT — PAIN SCALES - GENERAL
PAINLEVEL_OUTOF10: 10

## 2019-04-20 ASSESSMENT — PAIN DESCRIPTION - ORIENTATION: ORIENTATION: LEFT

## 2019-04-20 NOTE — PROGRESS NOTES
is pending      ASSESSMENT     Principal Problem:    Sepsis due to urinary tract infection ([address]  Active Problems:    Cystitis    Emphysematous cystitis    Septic shock (HCC)    Leukemoid reaction    Aspiration pneumonia of right lower lobe due to vomit (HCC)    MRSA carrier    Urinary retention    Abdominal distention    Elevated CEA    Elevated CA 19-9 level  Resolved Problems:    * No resolved hospital problems. *  Cholecystitis, rule out acute cholecystitis    Plan  1. Will review HIDA scan  2. If stable for surgery we will take her for cholecystectomy.   Otherwise if the gallbladder is occluded and the patient is not stable for surgery she will need a cholecystostomy tube placement

## 2019-04-20 NOTE — PROGRESS NOTES
Dr. Gavin Dennis notified regarding HIDAscan results.     Electronically signed by Becka Arnett RN on 4/20/2019 at 6:51 PM

## 2019-04-20 NOTE — PLAN OF CARE
Problem: Risk for Impaired Skin Integrity  Goal: Tissue integrity - skin and mucous membranes  Description  Structural intactness and normal physiological function of skin and  mucous membranes. Outcome: Met This Shift  Note:   Pt skin is dry, has wounds with mepilex over, and has dyer inplace and NG     Problem: Pain:  Goal: Pain level will decrease  Description  Pain level will decrease  Outcome: Met This Shift  Note:   Patient's pain has been well controlled throughout the entire shift, please see MAR. Problem: Falls - Risk of:  Goal: Will remain free from falls  Description  Will remain free from falls  Note:   The patient remained free from falls this shift, call light within reach, bed in locked and lowest position. Side rails up x2. Continue to monitor closely.

## 2019-04-20 NOTE — PROGRESS NOTES
Writer spoke with pt's sister-in-law Sharyn Jeong \"Pat\"  who is  to this patient's brother Dariel Molina. Mr. Carroll Aranda is currently hospitalized in [place], [address] At the number listed for Mr. Carroll Aranda, Mrs. Landaverde can be reached if necessary. Mrs. Landaverde stated that she can get messages to Mr. Carroll Aranda if necessary but is in [place] with an elderly family member. Pt does not have another other kin except for a son named \"Ruiz\" who only recently reconnected with his mother for which we have no contact information.         Electronically signed by Main Robbins RN on 4/20/2019 at 5:53 PM

## 2019-04-20 NOTE — PROGRESS NOTES
Dr. Freeman Course notified regarding this patient's HIDA Scan order clarification. Pt must be off TPN for 4 hours in order to complete HIDA scan. See orders.     Electronically signed by Cynthia Dorado RN on 4/20/2019 at 7:36 AM

## 2019-04-20 NOTE — PROGRESS NOTES
Dr. Ashley Jameson updated regarding this patient's elevated HR/afib, edema, increased urine output, and results of HIDAscan. See orders.     Electronically signed by Vane Gonsalez RN on 4/20/2019 at 6:47 PM

## 2019-04-20 NOTE — PROGRESS NOTES
[address] [facility].    PROGRESS NOTE             4/20/2019    8:42 AM    Name:   Ashvin Ferreira  MRN:     [id number]     Acct:      [de-identified]   Room:   2002/2002-01  IP Day:  9  Admit Date:  4/11/2019  2:48 PM    PCP:  Yonathan Fernandes DO  Code Status:  Full Code    Subjective: Interval History Status: improved. Patient seen and examined at bedside in the ICU. Afebrile, VSS. Patient appears better today visually. Alert and awake extubated yesterday without complications. Still complains of RUQ pain - RUQ u/s shows acute cholecystitis - HIDA scan today per GS. Hold Tube feeds 4 h prior to HIDA. TPN day 6 - tolerating tube feeds     Repeat Blood cx -ve @ 2 days    Trend CRP and procalcitonin. - procal @ 0.10, CRP @ 21.9 (prev 18.7)    CXR stable in interval.     Prealbumin 11.8      Brief History:     Per EMR:     The patient is a 77 y.o.  Female who presents withAbdominal Pain   and she is admitted to the hospital for the management of abdominal distension, nausea, vomiting, and acute cystitis.      Patient presents with chills, nausea, vomiting, abdominal pain (diffuse, but worse in the suprapubic region), abdominal distension, and dysuria of 2-3 days duration. Denies hematemesis. Acknowledges difficulty keeping food down but tolerating fluids. States abdominal pain is a 6/10 on average, and denies trying any medication to alleviate her sx.      Denies recent antibiotic use or steroid use.      ED: Tachycardic 100-114 BP, WBC 47.9 w/neutrophils 88,  UCx from 4/2 + E. Coli > 100,000 w/suceptibility to cipro. Review of Systems:     Review of Systems   Constitutional: Negative for activity change, appetite change, chills, diaphoresis, fatigue and fever. HENT: Negative for sore throat. Eyes: Negative for discharge and redness.    Respiratory: Negative for apnea, cough, chest tightness, shortness of breath, wheezing and stridor. Cardiovascular: Negative for leg swelling. Chest pain: Left-sided. Gastrointestinal: Positive for abdominal pain. Negative for abdominal distention, constipation, diarrhea, nausea and vomiting. Genitourinary: Negative for difficulty urinating, dysuria, flank pain, frequency, hematuria and urgency. Musculoskeletal: Negative for arthralgias. Neurological: Negative for dizziness, tremors, seizures, syncope, facial asymmetry, speech difficulty, weakness, light-headedness, numbness and headaches. Hematological: Negative for adenopathy. Medications: Allergies: Allergies   Allergen Reactions    Penicillins Shortness Of Breath and Rash    Amoxicillin        Current Meds:   Scheduled Meds:    magnesium sulfate  1 g Intravenous Once    pantoprazole  40 mg Intravenous Daily    And    sodium chloride (PF)  10 mL Intravenous Daily    methylPREDNISolone  40 mg Intravenous Q8H    sodium chloride  250 mL Intravenous Once    vancomycin  1,000 mg Intravenous Q12H    polyvinyl alcohol  1 drop Both Eyes Q4H    And    lubrifresh P.M.    Both Eyes Q4H    chlorhexidine  15 mL Mouth/Throat BID    ipratropium  0.5 mg Nebulization Q4H    insulin lispro  0-12 Units Subcutaneous Q6H    levalbuterol  1.25 mg Nebulization Q4H    docusate sodium  100 mg Oral TID    vancomycin (VANCOCIN) intermittent dosing (placeholder)   Other RX Placeholder    levofloxacin  500 mg Intravenous Q24H    venlafaxine  37.5 mg Oral TID    clopidogrel  75 mg Oral Daily    aspirin  81 mg Oral Daily    sodium chloride flush  10 mL Intravenous 2 times per day    enoxaparin  40 mg Subcutaneous Daily     Continuous Infusions:    dextrose 65 mL/hr at 04/20/19 0815    propofol Stopped (04/19/19 1030)    vasopressin (Septic Shock) infusion Stopped (04/14/19 1930)    norepinephrine Stopped (04/16/19 2102)    lactated ringers 125 mL/hr at 04/20/19 0725    dextrose       PRN Meds: metoprolol, fentanNYL, sodium PROVIDED HISTORY: pain Ordering Physician Provided Reason for Exam: pt twisted wrong, lt knee pain, unable to straighten knee. Acuity: Acute Type of Exam: Initial FINDINGS: Left femur, four views: The bones are diffusely osteopenic. No acute fracture deformity. The hip joint is maintained. The femoral head projects within the acetabulum. No focal soft tissue abnormality is seen. Left knee, two views: The knee is flexed. No acute osseous abnormality. No joint effusion. Joint spaces are not optimally profiled but no significant arthritic changes are apparent. Left tib-fib, three views: There is evidence of a remote healed distal tibia fracture. No acute fracture or dislocation is seen. No focal soft tissue abnormality. No acute osseous abnormality of the left femur. No acute osseous abnormality of the left knee. No acute osseous abnormality of the left tib-fib. Healed remote distal tibia fracture. Marked osteopenia, likely due to disuse. Xr Knee Left (1-2 Views)    Result Date: 3/27/2019  EXAMINATION: 4 XRAY VIEWS OF THE LEFT FEMUR; 2 XRAY VIEWS OF THE LEFT KNEE; 3 XRAY VIEWS OF THE LEFT TIBIA AND FIBULA 3/27/2019 7:00 pm COMPARISON: Left hip 11/14/2015. HISTORY: ORDERING SYSTEM PROVIDED HISTORY: pain TECHNOLOGIST PROVIDED HISTORY: pain Ordering Physician Provided Reason for Exam: pt twisted wrong, lt knee pain, unable to straighten knee. Acuity: Acute Type of Exam: Initial FINDINGS: Left femur, four views: The bones are diffusely osteopenic. No acute fracture deformity. The hip joint is maintained. The femoral head projects within the acetabulum. No focal soft tissue abnormality is seen. Left knee, two views: The knee is flexed. No acute osseous abnormality. No joint effusion. Joint spaces are not optimally profiled but no significant arthritic changes are apparent. Left tib-fib, three views: There is evidence of a remote healed distal tibia fracture.   No acute fracture or dislocation is seen.  No focal soft tissue abnormality. No acute osseous abnormality of the left femur. No acute osseous abnormality of the left knee. No acute osseous abnormality of the left tib-fib. Healed remote distal tibia fracture. Marked osteopenia, likely due to disuse. Xr Knee Left (3 Views)    Result Date: 3/30/2019  EXAMINATION: 3 XRAY VIEWS OF THE LEFT KNEE 3/30/2019 10:58 am COMPARISON: March 27, 2018 HISTORY: ORDERING SYSTEM PROVIDED HISTORY: F/u L knee pain after cast TECHNOLOGIST PROVIDED HISTORY: AP, oblique, lateral F/u L knee pain after cast FINDINGS: Marked osteopenia. Interval casting, which degrades fine osseous detail question cortical offset in the lateral femoral metaphysis. No malalignment identified. No significant joint effusion. Interval casting. Question nondisplaced fracture in the lateral femoral metaphysis. Osteopenia. Xr Tibia Fibula Left (2 Views)    Result Date: 3/27/2019  EXAMINATION: 4 XRAY VIEWS OF THE LEFT FEMUR; 2 XRAY VIEWS OF THE LEFT KNEE; 3 XRAY VIEWS OF THE LEFT TIBIA AND FIBULA 3/27/2019 7:00 pm COMPARISON: Left hip 11/14/2015. HISTORY: ORDERING SYSTEM PROVIDED HISTORY: pain TECHNOLOGIST PROVIDED HISTORY: pain Ordering Physician Provided Reason for Exam: pt twisted wrong, lt knee pain, unable to straighten knee. Acuity: Acute Type of Exam: Initial FINDINGS: Left femur, four views: The bones are diffusely osteopenic. No acute fracture deformity. The hip joint is maintained. The femoral head projects within the acetabulum. No focal soft tissue abnormality is seen. Left knee, two views: The knee is flexed. No acute osseous abnormality. No joint effusion. Joint spaces are not optimally profiled but no significant arthritic changes are apparent. Left tib-fib, three views: There is evidence of a remote healed distal tibia fracture. No acute fracture or dislocation is seen. No focal soft tissue abnormality. No acute osseous abnormality of the left femur.  No acute osseous abnormality of the left knee. No acute osseous abnormality of the left tib-fib. Healed remote distal tibia fracture. Marked osteopenia, likely due to disuse. Ct Abdomen Pelvis W Iv Contrast    Result Date: 4/11/2019  EXAMINATION: CT OF THE ABDOMEN AND PELVIS WITH CONTRAST 4/11/2019 5:14 pm TECHNIQUE: CT of the abdomen and pelvis was performed with the administration of intravenous contrast. Multiplanar reformatted images are provided for review. Dose modulation, iterative reconstruction, and/or weight based adjustment of the mA/kV was utilized to reduce the radiation dose to as low as reasonably achievable. COMPARISON: None. HISTORY: ORDERING SYSTEM PROVIDED HISTORY: Abdominal pain TECHNOLOGIST PROVIDED HISTORY: IV Only Contrast Ordering Physician Provided Reason for Exam: patient c/o abd pain for an hour FINDINGS: Lower Chest: Trace pleural fluid bilaterally is noted. Indwelling cardiac pacemaker is present. Heart size is normal.  Coronary artery calcifications are evident. The esophagus is significantly dilated and fluid-filled. No paraesophageal adenopathy is evident. Organs: The spleen, pancreas, and adrenals are unremarkable. The liver contains hypodensities, likely cysts. The gallbladder is distended and contains a solitary gallstone. The kidneys excrete contrast bilaterally. Extrarenal collecting systems are noted. The ureters are mildly dilated down to the urinary bladder without evidence of intraluminal or ureteral obstructing calculi. GI/Bowel: Marked distention of the stomach is noted; this continues to the pylorus with appearance suggesting partial gastric outlet obstruction. Fluid is present in some small bowel loops and colon distal to the stomach. No small bowel obstruction. Pelvis: Marked distention of the urinary bladder is noted. 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 Distended ureters may be related to reflux or infection. No free pelvic fluid, pelvic or inguinal adenopathy is noted. Peritoneum/Retroperitoneum: No aortic aneurysm. Mild to moderate plaque is noted in the infrarenal abdominal aorta and both proximal iliac arteries. Shotty lymph nodes are present around the aorta in the upper abdomen. No mesenteric adenopathy is noted. Bones/Soft Tissues: Degenerative changes are present in the hips and lower lumbar facets. Estimated biologic radiation dose for this procedure:258.77 mGy/cm2.     1. Dilatation of the thoracic esophagus filled with fluid. No obstructive process is noted. No adjacent enlarged lymph nodes. 2. Trace pleural fluid. 3. Marked distention of the stomach. This appears to extend to the pylorus. Partial gastric outlet obstruction is not excluded. 4. Bilateral dilated ureters without obstructing calculi. Urinary bladder is markedly distended with bladder wall air suggesting emphysematous cystitis. Dilatation of the ureters may be related to reflux or infection. 5. Atherosclerotic disease. 6. Other findings as above. Critical results were called by Dr. Angelique Singh MD to [address] on 4/11/2019 at 17:37. Xr Chest Portable    Result Date: 4/12/2019  EXAMINATION: SINGLE XRAY VIEW OF THE CHEST 4/12/2019 5:26 am COMPARISON: November 14, 2015. HISTORY: ORDERING SYSTEM PROVIDED HISTORY: line placement TECHNOLOGIST PROVIDED HISTORY: line placement Ordering Physician Provided Reason for Exam: New right side line placement. Acuity: Acute Type of Exam: Initial Additional signs and symptoms: New right side line placement. FINDINGS: Stable left pectoral trans venous cardiac pacer device. New right IJ central venous catheter with tip near the superior atrial caval junction. Normal lung volume. No new consolidation. Curvilinear radiopacity projecting over the right upper lobe likely represents artifact from a skin fold. No pleural effusion or pneumothorax.   Stable cardiomediastinal silhouette and great vessels with redemonstration of atherosclerotic thoracic aorta. New right IJ central venous catheter with tip near the superior atrial caval junction. No pneumothorax. No new consolidation. Physical Examination:        Physical Exam   Constitutional: She appears well-developed. She appears cachectic. She appears ill. No distress. Intubated   HENT:   Head: Normocephalic and atraumatic. Eyes: Pupils are equal, round, and reactive to light. Conjunctivae and EOM are normal.   Neck: Normal range of motion. Right IJ central line in place. Site clean and dry. Cardiovascular: Normal rate, regular rhythm, normal heart sounds and intact distal pulses. Exam reveals no gallop and no friction rub. No murmur heard. Pulmonary/Chest: Effort normal. No stridor. No respiratory distress. She has no wheezes. She has no rales. Intubated and mechanically ventilated. Abdominal: Soft. Bowel sounds are normal. She exhibits no mass. There is tenderness (Decreased tenderness to light palpation. Improvement from prior. ). There is guarding. There is no rebound. RUQ tenderness    Musculoskeletal: Normal range of motion. She exhibits no edema (Anasarca; 3+ b/l pitting edema in upper and lower ext. ). Left knee wrapped in dressing. Posterior bruising noted. Neurological:   Intubated, sedated   Skin: Skin is warm and dry. Capillary refill takes less than 2 seconds. She is not diaphoretic. No pallor. Nursing note and vitals reviewed.     Assessment:        Primary Problem  Sepsis due to urinary tract infection [facility])    Active Hospital Problems    Diagnosis Date Noted    Urinary retention [R33.9]     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]     Septic shock ([address] [A41.9, R65.21]     Leukemoid reaction [D72.823]     Aspiration pneumonia of right lower lobe due to vomit ([address] [J69.0]     MRSA carrier [Z22.322]     Sepsis due to urinary tract infection ([address] [A41.9, N39.0] 04/11/2019    Cystitis [N30.90] 04/11/2019       Plan:    Septic shock 2/2 E. Coli Emphysematous Cystitis + MRSA PNA   WBC 17-->22.4-->27.5-->31.8   Levaquin, Vanc   CXR multifocal airspace disease, left basilar consolidation/effusion   ECHO 4/12: LVEF 45%, RVSP 36 mmHg   Urology consult, appreciate recs --> cont cath until out of ICU and stable   Critical care consult, appreciate recs   Gen Surg, right IJ central line placed on 4/12   ID consult, appreciate recs   Resp Cx: mod budding yeast --> started diflucan  loading dose, then 400 qD thereafter - received 3 doses total - D/C per ID  Procalcitonin @ 0.10, CRP @ 21.9 (prev 18.7) - cont to trend    Acute hypoxic resp failure 2/2 poss aspiration PNA   Intubated, sedated    Pulm/CC following   Weaning steroids    GI Malignancy ruled out by EGD, likely presenting w/Gastroparesis   Hx of BMs during this admission   Persistent clinical abdominal distension and pain   EGD: esophagitis, hiatal hernia, solid food in 75% stomach --> possible gastroparesis   Pepcid switched to Protonix, per GI recs   TPN   Multiple tumor markers mildly elevated --> will wait to consider heme/onc consult, markers likely elevated 2/2 sepsis   Reglan started on 4/17 --> Daily ECG   Considering systemic autonomic dysfunction as overlying diagnosis (gastroparesis, neurogenic bladder, hypotension/fluctuating BPs)     Anemia, likely 2/2 bleeding   Transfusion 1 U RBC on 4/14   Transfusion 2 U RBC on 4/16    Restarted Lovenox, INR wNL    Hypokalemia   Potassium sliding scale    Hypophosphatemia   Sodium phosphate to correct    Left knee remote distal tibia fx w/osteopenia   Ortho replaced brace    Maintenance   Lovenox   Dulera, Xopenex   Continue home meds trazodone, ASA, Plavix, Norvasc, Effexor, Spiriva     Dispo   PT/OT Eval and treat   Social work for [address] [place] [address]  4/20/2019  8:42 AM   Attending Physician Statement  Patient seen and examined  I have discussed the care of the patient, including pertinent history and exam findings,  with the resident. I have reviewed the key elements of all parts of the encounter with the resident. I agree with the assessment, plan and orders as documented by the resident. cc time > 30 min     hisa pos   afin rvr   add amioderone   increase lopressor    ef 45 %    Hansa Kapoor    Electronically signed by Vivian Meneses on 4/20/2019 at 8:42 AM

## 2019-04-20 NOTE — PROGRESS NOTES
[facility]    Gastroenterology Daily Progress Note      Patient:   Lisandra Barboza   :    1950   Facility:   [facility] [place]   Date:     2019  Consultant:   Taurus Sung CNP      SUBJECTIVE  77 y.o. female admitted 2019 with Sepsis due to urinary tract infection ([facility] [address] [A41.9, N39.0]  Cystitis [N30.90]  Cystitis [N30.90] and seen for gastric distention and ileus. The pt was seen and examined. She is alert and has mild diffuse abdominal pain with no nausea. She had 700ml out of the NG overnight, no obvious blood. She had two loose non bloody bowel movements. She is currently NPO for a HIDA scan. She has leucocytosis but has been on steroids. OBJECTIVE  Scheduled Meds:   magnesium sulfate  1 g Intravenous Once    pantoprazole  40 mg Intravenous Daily    And    sodium chloride (PF)  10 mL Intravenous Daily    methylPREDNISolone  40 mg Intravenous Q8H    sodium chloride  250 mL Intravenous Once    vancomycin  1,000 mg Intravenous Q12H    polyvinyl alcohol  1 drop Both Eyes Q4H    And    lubrifresh P.M.    Both Eyes Q4H    chlorhexidine  15 mL Mouth/Throat BID    ipratropium  0.5 mg Nebulization Q4H    insulin lispro  0-12 Units Subcutaneous Q6H    levalbuterol  1.25 mg Nebulization Q4H    docusate sodium  100 mg Oral TID    vancomycin (VANCOCIN) intermittent dosing (placeholder)   Other RX Placeholder    levofloxacin  500 mg Intravenous Q24H    venlafaxine  37.5 mg Oral TID    clopidogrel  75 mg Oral Daily    aspirin  81 mg Oral Daily    sodium chloride flush  10 mL Intravenous 2 times per day    enoxaparin  40 mg Subcutaneous Daily       Vital Signs:  BP (!) 155/96   Pulse 98   Temp 98.2 °F (36.8 °C) (Oral)   Resp 16   Ht 5' (1.524 m)   Wt 144 lb 13.5 oz (65.7 kg)   SpO2 92%   BMI 28.29 kg/m²      Physical Exam:   General appearance: Alert & oriented, NAD  Lungs: CTA bilaterally    Heart: S1S2 RRR  Abdomen: Soft, Nontender, Not distended, BS WNL NG no drainage in the container currently  Skin: No jaundice, No clubbing, No cyanosis    Lab and Imaging Review     CBC  Recent Labs     04/18/19  0451  04/19/19  0341 04/19/19  1600 04/20/19  0018 04/20/19  0355   WBC 22.4*  --  27.5*  --   --  31.8*   HGB 11.2*   < > 11.0* 10.1* 11.5* 12.8   HCT 34.5*   < > 33.2* 31.3* 35.1* 39.5   MCV 90.2  --  91.1  --   --  91.9     --  191  --   --  273    < > = values in this interval not displayed. Results for Mikie Louis (MRN [id number]) as of 4/20/2019 09:15   Ref. Range 4/15/2019 11:10    Latest Ref Range: <38 U/mL 62 (H)   CA 19-9 Latest Ref Range: 0 - 35 U/mL 49 (H)   CEA Latest Ref Range: <3.9 ng/mL 5.6 (H)     BMP  Recent Labs     04/18/19  0451 04/19/19  0341 04/20/19  0355    139 139   K 4.4 4.6 4.4    99 99   CO2 30 31 30   BUN 12 15 16   CREATININE <0.40* <0.40* <0.40*   GLUCOSE 115* 141* 139*   CALCIUM 6.9* 7.6* 8.2*       LFTS  Recent Labs     04/18/19  1558 04/19/19  0341 04/20/19  0355   ALKPHOS 80 106* 132*   ALT 13 17 24   AST 24 30 41*   PROT 4.2* 4.6* 5.2*   BILITOT 0.46 0.51 0.62   BILIDIR 0.20  --   --    LABALBU 2.3* 2.3* 2.5*       AMYLASE/LIPASE/AMMONIA  Recent Labs     04/18/19  1558 04/20/19  0355   AMYLASE 259* 267*   LIPASE 52 60       PT/INR  Recent Labs     04/18/19  0451 04/19/19  0341 04/20/19  0355   PROTIME 13.3 14.4 13.9   INR 1.0 1.1 1.1     FINDINGS:ct abd 4/14/19   Lower Chest: New moderate layering bilateral pleural effusions with bilateral   lower lobe atelectasis.       Organs: Limited evaluation due lack of intravenous contrast.  Cholelithiasis   redemonstrated.  No gallbladder wall thickening or biliary ductal dilatation. Scattered tiny hypodense lesions in the liver are too small to characterize   but statistically represent benign cysts or hemangiomas and appear unchanged.    The pancreas, spleen, adrenal glands, and kidneys are unremarkable. Kendal Bares is   no hydronephrosis or urinary tract appearing     Esophagus: abnormal: Has a grade 2 esophagitis. Appears peptic esophagitis. Has a small sliding hiatal hernia.     Stomach:  Fundus of the stomach and body of the stomach. With solid food. 75% of the lumen is occupied with solid food.     Antrum: No retained food. Able to advance the scope through the pylorus without difficulty. No pyloric stenosis seen. No signs of outlet obstruction.     Duodenum:     Descending: normal    Bulb: normal  Minimal liquid present in the 2nd part of the duodenum. While withdrawing the scope the above findings were verified and the scope was removed. The patient has tolerated the procedure and conscious sedation without unusual events.        ASSESSMENT/PLAN:  1. Gastric distention; improved with NG   -await results of HIDA scan  -await results of amylase    2.anemia; no overt bleeding EGD showed grade 2 esophagitis  -continue to trend the h/h  -continue the ppi    3.sepsis; management per ID    4. Elevated tumor markers, will discuss with Dr Bushra Hood    This plan was formulated in collaboration with Dr. Bushra Hood.     Electronically signed by: AGAPITO Easton CNP on 4/20/2019 at 9:18 AM

## 2019-04-20 NOTE — FLOWSHEET NOTE
04/20/19 1032   Encounter Summary   Services provided to: Patient   Referral/Consult From: Rounding   Complexity of Encounter Low   Length of Encounter 15 minutes   Spiritual/Christianity   Type Spiritual support   Assessment Sleeping   Intervention Prayer   Outcome Did not respond

## 2019-04-20 NOTE — PROGRESS NOTES
Nutrition Assessment (Parenteral Nutrition)    Type and Reason for Visit: Reassess    Nutrition Recommendations: Continue NPO status, TPN and lipids. Following changes made in additives: insulin: 11 to 8 units and remove MVI & trace elements. Nutrition Assessment: Patient is stable from a nutritional standpoint as evidence by TPN at 65 mL/hr and tolerated. Patient extubated but nasogastric tube continues. Triglyceride levels are normal. Will monitor for HIDA scan results. Continue TPN and lipids. Will monitor nutrition progression. Malnutrition Assessment:  · Malnutrition Status: At risk for malnutrition  · Context: Acute illness or injury  · Findings of the 6 clinical characteristics of malnutrition (Minimum of 2 out of 6 clinical characteristics is required to make the diagnosis of moderate or severe Protein Calorie Malnutrition based on AND/ASPEN Guidelines):  1. Energy Intake-Less than or equal to 75% of estimated energy requirement(Previously; improving with parenteral nutrition), Greater than or equal to 5 days      Nutrition Risk Level: High    Nutrient Needs:  · Estimated Daily Total Kcal: 0009-8813 based on wt of 25-27 per kg using wt of 57.2 kg    Nutrition Diagnosis:   · Problem: Inadequate oral intake  · Etiology: related to Alteration in GI function, Insufficient energy/nutrient consumption     Signs and symptoms:  as evidenced by NPO status due to medical condition, Nutrition support - PN    Objective Information:  · Nutrition-Focused Physical Findings: +3 pitting BUE, +2 BLE edema.  Nasogastric tube set to suction   · Wound Type: Stage I, Pressure Ulcer(Stage pressure ulcer on buttocks; thigh wound)  · Current Nutrition Therapies:  · Oral Diet Orders: NPO   · Oral Diet intake: NPO  · Oral Nutrition Supplement (ONS) Orders: None  · ONS intake: NPO  · Parenteral Nutrition Orders:  · Type and Formula: 2-in-1 Custom, Premix Central   · Lipids: None(On Propofol & TG elevated)  · Rate/Volume: 65 ml/ 1560 ml daily  · Duration: Continuous  · Current PN Order Provides: 1373 kcal, 78 gm pro  · Goal PN Orders Provides: 3057-4255 kcal; 63-68 gm pro  · Additional Calories: Propofol at 5.2 ml/hr providing 137 kcal  · Anthropometric Measures:  · Ht: 5' (152.4 cm)   · Current Body Wt: 144 lb (65.3 kg)  · Admission Body Wt: 102 lb (46.3 kg)  · Ideal Body Wt: 100 lb (45.4 kg), % Ideal Body 102% based on admission wt  · BMI Classification: BMI 25.0 - 29.9 Overweight    Nutrition Interventions:   Continue NPO, Modify current Parenteral Nutrition  Continued Inpatient Monitoring    Nutrition Evaluation:   · Evaluation: Progressing toward goals   · Goals: PN to meet greater 90% of estimated nutrition needs   · Monitoring: Nutrition Progression, Weight, Monitor Bowel Function, Pertinent Labs, Diet Tolerance, I&O, Skin Integrity, Wound Healing      [facility], RJACQUELINE, LJACQUELINE,  Clinical Dietitian  Cell # [phone]  Office # [phone]

## 2019-04-20 NOTE — PROGRESS NOTES
emulsion  100 mL Intravenous Daily    magnesium sulfate  1 g Intravenous Once    pantoprazole  40 mg Intravenous Daily    And    sodium chloride (PF)  10 mL Intravenous Daily    methylPREDNISolone  40 mg Intravenous Q8H    sodium chloride  250 mL Intravenous Once    vancomycin  1,000 mg Intravenous Q12H    polyvinyl alcohol  1 drop Both Eyes Q4H    And    lubrifresh P.M.    Both Eyes Q4H    chlorhexidine  15 mL Mouth/Throat BID    ipratropium  0.5 mg Nebulization Q4H    insulin lispro  0-12 Units Subcutaneous Q6H    levalbuterol  1.25 mg Nebulization Q4H    docusate sodium  100 mg Oral TID    vancomycin (VANCOCIN) intermittent dosing (placeholder)   Other RX Placeholder    levofloxacin  500 mg Intravenous Q24H    venlafaxine  37.5 mg Oral TID    clopidogrel  75 mg Oral Daily    aspirin  81 mg Oral Daily    sodium chloride flush  10 mL Intravenous 2 times per day    enoxaparin  40 mg Subcutaneous Daily      dextrose 65 mL/hr at 04/20/19 0815    PN-Adult 2-in-1 Central Line (Standard)      propofol Stopped (04/19/19 1030)    vasopressin (Septic Shock) infusion Stopped (04/14/19 1930)    norepinephrine Stopped (04/16/19 2102)    lactated ringers 125 mL/hr at 04/20/19 0725    dextrose       sodium chloride flush, metoprolol, fentanNYL, sodium chloride nebulizer, fentanNYL, oxyCODONE-acetaminophen, magnesium sulfate, sodium phosphate IVPB **OR** sodium phosphate IVPB, potassium chloride **OR** potassium alternative oral replacement **OR** potassium chloride, glucose, dextrose, glucagon (rDNA), dextrose, milk and molasses, sodium chloride flush, acetaminophen    LABS   CBC   Recent Labs     04/20/19  0355   WBC 31.8*   HGB 12.8   HCT 39.5   MCV 91.9        BMP:   Lab Results   Component Value Date     04/20/2019    K 4.4 04/20/2019    CL 99 04/20/2019    CO2 30 04/20/2019    BUN 16 04/20/2019    LABALBU 2.5 04/20/2019    LABALBU 4.6 05/17/2012    CREATININE <0.40 04/20/2019

## 2019-04-20 NOTE — CARE COORDINATION
ONGOING DISCHARGE PLAN:     Plan remains for LSW to continue to follow for possible return to [facility], [facility]. Will follow PT/OT rec.     Remains on IV Levaquin/Vanco & steroids, 40 mg, Q8.      1. Per surgery notes, \"If stable for surgery we will take her for cholecystectomy. Otherwise if the gallbladder is occluded and the patient is not stable for surgery she will need a cholecystostomy tube placement\"    Will continue to follow for additional d/c needs.     Electronically signed by Tony Cadet RN on 4/20/2019 at 1:22 PM

## 2019-04-20 NOTE — PROGRESS NOTES
04/18/19  0451 04/19/19  0341   PROTIME 13.6 13.3 14.4   INR 1.0 1.0 1.1     No results for input(s): PTT in the last 72 hours. Radiology Review:  Ultrasound of the gallbladder revealed possibility of cholecystitis. Reviewed. ASSESSMENT:  Principal Problem:    Sepsis due to urinary tract infection ([address]  Active Problems:    Cystitis    Emphysematous cystitis    Septic shock (HCC)    Leukemoid reaction    Aspiration pneumonia of right lower lobe due to vomit [facility])    MRSA carrier    Urinary retention  Resolved Problems:    * No resolved hospital problems. *      The conditions that I am treating are Stable and show no change    PLAN :  1. Will get HIDA scan to make sure that this patient has cystic duct obstruction. 2. Elevated WBC count may be related to gallbladder disease. 3. Patient has gastric distention. Etiology of this is not clear. 4. May need to clamp the NG tube and see how she does. 5. Discussed with the nursing staff. Thank you for allowing me to participate in the care of your patient. Please feel free to contact me with any concerns. [phone]    Luis Miguel Mcknight MD    Note is dictated utilizing voice recognition software. Unfortunately this leads to occasional typographical errors. Please contact our office if you have any questions.

## 2019-04-20 NOTE — PROGRESS NOTES
Infectious disease Consult Note      Patient: Chloe Alexander  : 1950  Acct#:  [id number]     Date:  2019    Assessment:     Chololithiases ,possible Cholecystitis followed by surgery . Sepsis due to [facility] Emphysematous cystitis     Aspiration pneumonia of right lower lobe. Septic shock     Yeast growth on sputum culture suspect contamination     Leukocytosis      AMYLASE elevated 259 possible pancreatitis . Acute hypoxic resp failure     MRSA carrier    Urinary retention ,Tran cath in place     Anemia                 Recommendations:   Continue IV Vancomycin and IV Levaquin add Flagyl. HIDA pending   Would likely need Cholecystectomy vs cholecystectomy tube . Repeat Blood culture no growth     Follow CBC ,vancomycin level and renal function closely . Continue supportive care . Subjective:       History of Present Illness  Patient is a 77 y.o.  female admitted with Sepsis due to urinary tract infection   who is seen in consult for the same . She presented w dysuria and lower abd pain for around a week associated with vomiting ,was found hypotensive with leukocytosis . CT suggested emphysematous cystitis,possible gastric outlet obstruction. CXR showed a right lower infiltrate. High CA-19-9,CEA  Allergy to [facility] w SOB   Urine culture  grew ESCHERICHIA COLI resistant to Ampicillin ,sensitive to all others tested ABXS . Blood cultures negative . Mycoplasma IGM 0.97   YEAST MODERATE GROWTH on sputum culture   S/P EGD   with reported esophagitis. Amylase elevated. LIPASE,LFT unremarkable . Interval history :  Extubated    She is hungry ,denied pain ,no Vomiting or diarrhea . No fever . Levophed  off  . WBC 31  GB US Findings suggesting acute cholecystitis. CXR today reviewed no change.   Past Medical History:   Diagnosis Date    Abnormal computed tomography of cervical spine     sclerotic bone appearance     CVA (cerebral vascular accident) ([address] left  side weakness    GERD (gastroesophageal reflux disease)     Hypertension     Paraproteinemia     Weight loss       Past Surgical History:   Procedure Laterality Date    INTUBATION  4/14/2019         PACEMAKER PLACEMENT  07/2011    Pacemaker is Medtronic Revo (compatible). Leads placed in 1995 are NOT MRI compatible. Placed at [facility]. V's per Dr. Kaushik Taylor can not have an MRI.  UPPER GASTROINTESTINAL ENDOSCOPY N/A 4/16/2019    EGD ESOPHAGOGASTRODUODENOSCOPY @ BEDSIDE  ICU 2002 performed by Gato Middleton MD at [id number]          Admission Meds  No current facility-administered medications on file prior to encounter. Current Outpatient Medications on File Prior to Encounter   Medication Sig Dispense Refill    oxyCODONE-acetaminophen (PERCOCET) 5-325 MG per tablet Take 1 tablet by mouth every 6 hours as needed for Pain.  senna-docusate (PERICOLACE) 8.6-50 MG per tablet Take 1 tablet by mouth 2 times daily      budesonide-formoterol (SYMBICORT) 160-4.5 MCG/ACT AERO Inhale 2 puffs into the lungs 2 times daily      sucralfate (CARAFATE) 1 GM/10ML suspension Take 1 g by mouth 4 times daily       traZODone (DESYREL) 50 MG tablet Take 50 mg by mouth nightly      tiZANidine (ZANAFLEX) 2 MG tablet Take 2 mg by mouth every 8 hours as needed (left knee)       aspirin 81 MG tablet Take 81 mg by mouth daily      clopidogrel (PLAVIX) 75 MG tablet TAKE 1 TABLET DAILY 30 tablet 2    DOCQLACE 100 MG capsule TAKE 1 CAPSULE BY MOUTH IN THE MORNING & IN THE EVENING -DRINK PLENTYOF WATER WHILE TAKING THIS MEDICINE 30 capsule 1    amLODIPine (NORVASC) 10 MG tablet Take 10 mg by mouth daily.  LORazepam (ATIVAN) 0.5 MG tablet Take 0.5 mg by mouth every 6 hours as needed for Anxiety.  therapeutic multivitamin-minerals (THERAGRAN-M) tablet Take 1 tablet by mouth daily.  omeprazole (PRILOSEC) 20 MG capsule Take 20 mg by mouth daily.       venlafaxine (EFFEXOR) 37.5 MG tablet Take 37.5 mg by mouth 3 times daily.  Misc. Devices [facility]) [address] wheelchair with left fupper extremity support  Dx: stroke with left hemiparesis. 1 each 0           Allergies  Allergies   Allergen Reactions    Penicillins Shortness Of Breath and Rash    Amoxicillin         Social   Social History     Tobacco Use    Smoking status: Current Some Day Smoker     Packs/day: 1.00     Years: 30.00     Pack years: 30.00    Smokeless tobacco: Never Used   Substance Use Topics    Alcohol use: Not Currently     Alcohol/week: 16.8 oz     Types: 28 Glasses of wine per week                History reviewed. No pertinent family history. Review of Systems  Other than above 12 systems reviewed negative . Tolerating antibiotics. Physical Exam  BP (!) 155/96   Pulse 98   Temp 98.2 °F (36.8 °C) (Oral)   Resp 16   Ht 5' (1.524 m)   Wt 144 lb 13.5 oz (65.7 kg)   SpO2 92%   BMI 28.29 kg/m²           General Appearance: alert ,NAD . Skin: warm and dry, no rash or erythema  Head: normocephalic and atraumatic  Eyes: pupils equal, round  Neck: neck supple and non tender   Pulmonary/Chest: coarse to auscultation bilaterally- no wheezes, rales or rhonchi  Cardiovascular: normal rate, regular rhythm, normal S1 and S2, no murmurs. Abdomen: soft, -distended, normal bowel sounds, no masses or organomegaly  Extremities: no cyanosis, clubbing   Edema   Musculoskeletal: left knee cast   Right IJ line   Tran in place    Data Review:    Recent Labs     04/18/19  0451  04/19/19  0341 04/19/19  1600 04/20/19  0018 04/20/19  0355   WBC 22.4*  --  27.5*  --   --  31.8*   HGB 11.2*   < > 11.0* 10.1* 11.5* 12.8   HCT 34.5*   < > 33.2* 31.3* 35.1* 39.5   MCV 90.2  --  91.1  --   --  91.9     --  191  --   --  273    < > = values in this interval not displayed.      Recent Labs     04/17/19  1238 04/18/19  0451 04/19/19  0341 04/20/19  0355   NA  --  137 139 139   K 4.1 4.4 4.6 4.4   CL  --  100 99 99   CO2  --  30 31 30 PHOS 2.0* 2.7 3.6  --    BUN  --  12 15 16   CREATININE  --  <0.40* <0.40* <0.40*     Recent Labs     04/18/19  1558 04/19/19  0341 04/20/19  0355   AST 24 30 41*   ALT 13 17 24   BILIDIR 0.20  --   --    BILITOT 0.46 0.51 0.62   ALKPHOS 80 106* 132*     Recent Labs     04/18/19  1558 04/20/19  0355   LIPASE 52 60   AMYLASE 259* 267*     Recent Labs     04/18/19  0451 04/19/19  0341 04/20/19  0355   PROTIME 13.3 14.4 13.9   INR 1.0 1.1 1.1       Imaging Studies:                           All appropriate imaging studies and reports reviewed: Yes       Ct Abdomen Pelvis Wo Contrast Additional Contrast? Oral    Result Date: 4/14/2019  EXAMINATION: CT OF THE ABDOMEN AND PELVIS WITHOUT CONTRAST 4/14/2019 7:34 pm TECHNIQUE: CT of the abdomen and pelvis was performed without the administration of intravenous contrast. Multiplanar reformatted images are provided for review. Dose modulation, iterative reconstruction, and/or weight based adjustment of the mA/kV was utilized to reduce the radiation dose to as low as reasonably achievable. COMPARISON: 04/11/2019 HISTORY: ORDERING SYSTEM PROVIDED HISTORY: ABDOMINAL PAIN TECHNOLOGIST PROVIDED HISTORY: Water soluble contrast only please Ordering Physician Provided Reason for Exam: Abdominal pain - Vented patient. Contrast given via nurse through NG tube. Acuity: Unknown Type of Exam: Unknown Relevant Medical/Surgical History: Hx - Sepsis due to urinary tract infection. FINDINGS: Lower Chest: New moderate layering bilateral pleural effusions with bilateral lower lobe atelectasis. Organs: Limited evaluation due lack of intravenous contrast.  Cholelithiasis redemonstrated. No gallbladder wall thickening or biliary ductal dilatation. Scattered tiny hypodense lesions in the liver are too small to characterize but statistically represent benign cysts or hemangiomas and appear unchanged. The pancreas, spleen, adrenal glands, and kidneys are unremarkable.   There is no hydronephrosis or urinary tract calculus. GI/Bowel: The stomach is distended. Enteric tube is in place. No contrast is seen distal to the pylorus and there is contrast reflux into the distal esophagus. There is no evidence of bowel obstruction. The appendix is not definitely visualized. No focal pericecal inflammatory changes are evident. Pelvis: The urinary bladder is decompressed by Tran catheter. No pelvic mass is seen. Peritoneum/Retroperitoneum: Small amount of free fluid in the pelvic cavity. No free air or focal fluid collection. No abnormal lymph node. Normal abdominal aortic caliber. Moderate calcific atherosclerosis. Bones/Soft Tissues: No acute osseous abnormality. Diffuse anasarca. Moderate degenerative changes in the lumbar spine. 1. Distended, contrast filled stomach with reflux of contrast into the esophagus. No enteric contrast is seen distal to the pylorus suggesting delayed gastric emptying in the setting of ileus. 2. New moderate layering bilateral pleural effusions with bilateral lower lobe atelectasis. 3. Small amount of nonspecific free fluid in the pelvic cavity. 4. Anasarca. 5. Cholelithiasis. Xr Chest (single View Frontal)    Result Date: 4/13/2019  EXAMINATION: SINGLE XRAY VIEW OF THE CHEST 4/13/2019 7:18 am COMPARISON: April 12, 2019 HISTORY: ORDERING SYSTEM PROVIDED HISTORY: dyspnea TECHNOLOGIST PROVIDED HISTORY: dyspnea Ordering Physician Provided Reason for Exam: dyspnea Acuity: Acute Type of Exam: Subsequent/Follow-up Additional signs and symptoms: dyspnea FINDINGS: A right IJ catheter is seen with its tip terminating at the superior cavoatrial junction. The left chest wall pacemaker and leads are stable. The cardiomediastinal silhouette is stable. There is interval increased opacity at the right lung base, may be related to atelectasis versus pneumonia. There is small atelectasis and mild pleural effusion at the left lung base. There is no pneumothorax.   There is no acute The bones are diffusely osteopenic. No acute fracture deformity. The hip joint is maintained. The femoral head projects within the acetabulum. No focal soft tissue abnormality is seen. Left knee, two views: The knee is flexed. No acute osseous abnormality. No joint effusion. Joint spaces are not optimally profiled but no significant arthritic changes are apparent. Left tib-fib, three views: There is evidence of a remote healed distal tibia fracture. No acute fracture or dislocation is seen. No focal soft tissue abnormality. No acute osseous abnormality of the left femur. No acute osseous abnormality of the left knee. No acute osseous abnormality of the left tib-fib. Healed remote distal tibia fracture. Marked osteopenia, likely due to disuse. Xr Knee Left (3 Views)    Result Date: 3/30/2019  EXAMINATION: 3 XRAY VIEWS OF THE LEFT KNEE 3/30/2019 10:58 am COMPARISON: March 27, 2018 HISTORY: ORDERING SYSTEM PROVIDED HISTORY: F/u L knee pain after cast TECHNOLOGIST PROVIDED HISTORY: AP, oblique, lateral F/u L knee pain after cast FINDINGS: Marked osteopenia. Interval casting, which degrades fine osseous detail question cortical offset in the lateral femoral metaphysis. No malalignment identified. No significant joint effusion. Interval casting. Question nondisplaced fracture in the lateral femoral metaphysis. Osteopenia. Xr Tibia Fibula Left (2 Views)    Result Date: 3/27/2019  EXAMINATION: 4 XRAY VIEWS OF THE LEFT FEMUR; 2 XRAY VIEWS OF THE LEFT KNEE; 3 XRAY VIEWS OF THE LEFT TIBIA AND FIBULA 3/27/2019 7:00 pm COMPARISON: Left hip 11/14/2015. HISTORY: ORDERING SYSTEM PROVIDED HISTORY: pain TECHNOLOGIST PROVIDED HISTORY: pain Ordering Physician Provided Reason for Exam: pt twisted wrong, lt knee pain, unable to straighten knee. Acuity: Acute Type of Exam: Initial FINDINGS: Left femur, four views: The bones are diffusely osteopenic. No acute fracture deformity. The hip joint is maintained. The femoral head projects within the acetabulum. No focal soft tissue abnormality is seen. Left knee, two views: The knee is flexed. No acute osseous abnormality. No joint effusion. Joint spaces are not optimally profiled but no significant arthritic changes are apparent. Left tib-fib, three views: There is evidence of a remote healed distal tibia fracture. No acute fracture or dislocation is seen. No focal soft tissue abnormality. No acute osseous abnormality of the left femur. No acute osseous abnormality of the left knee. No acute osseous abnormality of the left tib-fib. Healed remote distal tibia fracture. Marked osteopenia, likely due to disuse. Xr Abdomen (kub) (single Ap View)    Result Date: 4/14/2019  EXAMINATION: SINGLE SUPINE XRAY VIEW(S) OF THE ABDOMEN 4/14/2019 7:39 am COMPARISON: CT abdomen and pelvis  film from 11 April 2019 HISTORY: [address] [place] [address] Avenue: Abd Distention TECHNOLOGIST PROVIDED HISTORY: Abd Distention Ordering Physician Provided Reason for Exam: Abdominal distention. Pt was moving around in the bed. Best films at present time. Acuity: Acute Type of Exam: Initial Additional signs and symptoms: Abdominal distention. Pt was moving around in the bed. Best films at present time. FINDINGS: Portable view time stamped at 748 hours demonstrates an intestinal tube terminating in the midportion of a gaseous Spike dilated stomach. Densities are present over the stomach likely medication. Bipolar pacemaker is in situ with intact leads. Heart size is top-normal, stable. Gaseous distension of the stomach and loop of bowel in the upper mid abdomen is noted but there is gas and fecal material in the rectum. Gastric outlet obstruction or proximal small bowel partial obstruction is suspected. No free air is noted. Midline city is present over the pelvis likely a monitor or CT small bore catheter. Vascular calcification is present in the pelvis.      Persistent preferential gaseous distension of the stomach although there is some gas in the upper abdomen and gas and fecal material present in the rectosigmoid. Findings suggest gastric outlet obstruction. Ct Abdomen Pelvis W Iv Contrast    Result Date: 4/11/2019  EXAMINATION: CT OF THE ABDOMEN AND PELVIS WITH CONTRAST 4/11/2019 5:14 pm TECHNIQUE: CT of the abdomen and pelvis was performed with the administration of intravenous contrast. Multiplanar reformatted images are provided for review. Dose modulation, iterative reconstruction, and/or weight based adjustment of the mA/kV was utilized to reduce the radiation dose to as low as reasonably achievable. COMPARISON: None. HISTORY: ORDERING SYSTEM PROVIDED HISTORY: Abdominal pain TECHNOLOGIST PROVIDED HISTORY: IV Only Contrast Ordering Physician Provided Reason for Exam: patient c/o abd pain for an hour FINDINGS: Lower Chest: Trace pleural fluid bilaterally is noted. Indwelling cardiac pacemaker is present. Heart size is normal.  Coronary artery calcifications are evident. The esophagus is significantly dilated and fluid-filled. No paraesophageal adenopathy is evident. Organs: The spleen, pancreas, and adrenals are unremarkable. The liver contains hypodensities, likely cysts. The gallbladder is distended and contains a solitary gallstone. The kidneys excrete contrast bilaterally. Extrarenal collecting systems are noted. The ureters are mildly dilated down to the urinary bladder without evidence of intraluminal or ureteral obstructing calculi. GI/Bowel: Marked distention of the stomach is noted; this continues to the pylorus with appearance suggesting partial gastric outlet obstruction. Fluid is present in some small bowel loops and colon distal to the stomach. No small bowel obstruction. Pelvis: Marked distention of the urinary bladder is noted. 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  Distended ureters may be related lines as above. Xr Chest Portable    Result Date: 4/14/2019  EXAMINATION: SINGLE XRAY VIEW OF THE CHEST 4/14/2019 7:57 am COMPARISON: Portable chest 04/13/2019. HISTORY: ORDERING SYSTEM PROVIDED HISTORY: Intubation TECHNOLOGIST PROVIDED HISTORY: Intubation Ordering Physician Provided Reason for Exam: intubation Acuity: Acute Type of Exam: Initial Additional signs and symptoms: intubation FINDINGS: Endotracheal tube terminates over the midthoracic trachea. Dual-chamber pacemaker leads appear unchanged in position. Right IJ approach central venous catheter unchanged in position. Heart size not substantially changed. Perihilar and basilar opacities further increased. Left pleural effusion increased in size. Findings may reflect pulmonary edema, progressed from yesterday's exam.  Left pleural effusion increased in size. Xr Chest Portable    Result Date: 4/12/2019  EXAMINATION: SINGLE XRAY VIEW OF THE CHEST 4/12/2019 5:26 am COMPARISON: November 14, 2015. HISTORY: ORDERING SYSTEM PROVIDED HISTORY: line placement TECHNOLOGIST PROVIDED HISTORY: line placement Ordering Physician Provided Reason for Exam: New right side line placement. Acuity: Acute Type of Exam: Initial Additional signs and symptoms: New right side line placement. FINDINGS: Stable left pectoral trans venous cardiac pacer device. New right IJ central venous catheter with tip near the superior atrial caval junction. Normal lung volume. No new consolidation. Curvilinear radiopacity projecting over the right upper lobe likely represents artifact from a skin fold. No pleural effusion or pneumothorax. Stable cardiomediastinal silhouette and great vessels with redemonstration of atherosclerotic thoracic aorta. New right IJ central venous catheter with tip near the superior atrial caval junction. No pneumothorax. No new consolidation. Thank you for allowing me to participate in the care of your patient.   Please feel free to contact me with any questions or concerns.      Sebastian Coburn MD

## 2019-04-20 NOTE — PROGRESS NOTES
Dr. Leonor Jones notified regarding absent filling of the gallbladder consistent with acute cholecystitis.     Electronically signed by Manoj Oconnor RN on 4/20/2019 at 6:30 PM

## 2019-04-21 ENCOUNTER — APPOINTMENT (OUTPATIENT)
Dept: GENERAL RADIOLOGY | Age: 69
DRG: 853 | End: 2019-04-21
Payer: COMMERCIAL

## 2019-04-21 LAB
ALBUMIN SERPL-MCNC: 2.2 G/DL (ref 3.5–5.2)
ALBUMIN/GLOBULIN RATIO: ABNORMAL (ref 1–2.5)
ALP BLD-CCNC: 111 U/L (ref 35–104)
ALT SERPL-CCNC: 27 U/L (ref 5–33)
AMYLASE: 258 U/L (ref 28–100)
ANION GAP SERPL CALCULATED.3IONS-SCNC: 11 MMOL/L (ref 9–17)
AST SERPL-CCNC: 41 U/L
BILIRUB SERPL-MCNC: 0.47 MG/DL (ref 0.3–1.2)
BNP INTERPRETATION: ABNORMAL
BUN BLDV-MCNC: 15 MG/DL (ref 8–23)
BUN/CREAT BLD: ABNORMAL (ref 9–20)
CALCIUM SERPL-MCNC: 7.3 MG/DL (ref 8.6–10.4)
CHLORIDE BLD-SCNC: 95 MMOL/L (ref 98–107)
CO2: 28 MMOL/L (ref 20–31)
CREAT SERPL-MCNC: <0.4 MG/DL (ref 0.5–0.9)
GFR AFRICAN AMERICAN: ABNORMAL ML/MIN
GFR NON-AFRICAN AMERICAN: ABNORMAL ML/MIN
GFR SERPL CREATININE-BSD FRML MDRD: ABNORMAL ML/MIN/{1.73_M2}
GFR SERPL CREATININE-BSD FRML MDRD: ABNORMAL ML/MIN/{1.73_M2}
GLUCOSE BLD-MCNC: 137 MG/DL (ref 65–105)
GLUCOSE BLD-MCNC: 140 MG/DL (ref 65–105)
GLUCOSE BLD-MCNC: 146 MG/DL (ref 65–105)
GLUCOSE BLD-MCNC: 152 MG/DL (ref 70–99)
GLUCOSE BLD-MCNC: 153 MG/DL (ref 65–105)
HCT VFR BLD CALC: 37.3 % (ref 36–46)
HEMOGLOBIN: 12 G/DL (ref 12–16)
INR BLD: 1.1
LIPASE: 68 U/L (ref 13–60)
MAGNESIUM: 1.6 MG/DL (ref 1.6–2.6)
MCH RBC QN AUTO: 29.9 PG (ref 26–34)
MCHC RBC AUTO-ENTMCNC: 32.1 G/DL (ref 31–37)
MCV RBC AUTO: 93 FL (ref 80–100)
NRBC AUTOMATED: ABNORMAL PER 100 WBC
PDW BLD-RTO: 15.1 % (ref 11.5–14.9)
PLATELET # BLD: 215 K/UL (ref 150–450)
PMV BLD AUTO: 9 FL (ref 6–12)
POTASSIUM SERPL-SCNC: 3.5 MMOL/L (ref 3.7–5.3)
PRO-BNP: 1650 PG/ML
PROTHROMBIN TIME: 14.5 SEC (ref 11.8–14.6)
RBC # BLD: 4.02 M/UL (ref 4–5.2)
SODIUM BLD-SCNC: 134 MMOL/L (ref 135–144)
TOTAL PROTEIN: 4.6 G/DL (ref 6.4–8.3)
WBC # BLD: 27.2 K/UL (ref 3.5–11)

## 2019-04-21 PROCEDURE — 2500000003 HC RX 250 WO HCPCS: Performed by: STUDENT IN AN ORGANIZED HEALTH CARE EDUCATION/TRAINING PROGRAM

## 2019-04-21 PROCEDURE — C9113 INJ PANTOPRAZOLE SODIUM, VIA: HCPCS | Performed by: INTERNAL MEDICINE

## 2019-04-21 PROCEDURE — 82947 ASSAY GLUCOSE BLOOD QUANT: CPT

## 2019-04-21 PROCEDURE — 93005 ELECTROCARDIOGRAM TRACING: CPT

## 2019-04-21 PROCEDURE — 6370000000 HC RX 637 (ALT 250 FOR IP): Performed by: STUDENT IN AN ORGANIZED HEALTH CARE EDUCATION/TRAINING PROGRAM

## 2019-04-21 PROCEDURE — 94640 AIRWAY INHALATION TREATMENT: CPT

## 2019-04-21 PROCEDURE — APPNB30 APP NON BILLABLE TIME 0-30 MINS: Performed by: NURSE PRACTITIONER

## 2019-04-21 PROCEDURE — 82150 ASSAY OF AMYLASE: CPT

## 2019-04-21 PROCEDURE — 2500000003 HC RX 250 WO HCPCS: Performed by: INTERNAL MEDICINE

## 2019-04-21 PROCEDURE — 6370000000 HC RX 637 (ALT 250 FOR IP): Performed by: INTERNAL MEDICINE

## 2019-04-21 PROCEDURE — 6360000002 HC RX W HCPCS: Performed by: INTERNAL MEDICINE

## 2019-04-21 PROCEDURE — 2580000003 HC RX 258: Performed by: SURGERY

## 2019-04-21 PROCEDURE — 83735 ASSAY OF MAGNESIUM: CPT

## 2019-04-21 PROCEDURE — 2580000003 HC RX 258: Performed by: INTERNAL MEDICINE

## 2019-04-21 PROCEDURE — 83690 ASSAY OF LIPASE: CPT

## 2019-04-21 PROCEDURE — 99233 SBSQ HOSP IP/OBS HIGH 50: CPT | Performed by: INTERNAL MEDICINE

## 2019-04-21 PROCEDURE — 80053 COMPREHEN METABOLIC PANEL: CPT

## 2019-04-21 PROCEDURE — 2700000000 HC OXYGEN THERAPY PER DAY

## 2019-04-21 PROCEDURE — 85027 COMPLETE CBC AUTOMATED: CPT

## 2019-04-21 PROCEDURE — 94762 N-INVAS EAR/PLS OXIMTRY CONT: CPT

## 2019-04-21 PROCEDURE — 2000000000 HC ICU R&B

## 2019-04-21 PROCEDURE — 83880 ASSAY OF NATRIURETIC PEPTIDE: CPT

## 2019-04-21 PROCEDURE — 6360000002 HC RX W HCPCS: Performed by: STUDENT IN AN ORGANIZED HEALTH CARE EDUCATION/TRAINING PROGRAM

## 2019-04-21 PROCEDURE — 99232 SBSQ HOSP IP/OBS MODERATE 35: CPT | Performed by: INTERNAL MEDICINE

## 2019-04-21 PROCEDURE — 71045 X-RAY EXAM CHEST 1 VIEW: CPT

## 2019-04-21 PROCEDURE — 85610 PROTHROMBIN TIME: CPT

## 2019-04-21 RX ORDER — FUROSEMIDE 10 MG/ML
40 INJECTION INTRAMUSCULAR; INTRAVENOUS DAILY
Status: DISCONTINUED | OUTPATIENT
Start: 2019-04-22 | End: 2019-05-10

## 2019-04-21 RX ORDER — DIGOXIN 0.25 MG/ML
125 INJECTION INTRAMUSCULAR; INTRAVENOUS ONCE
Status: COMPLETED | OUTPATIENT
Start: 2019-04-21 | End: 2019-04-21

## 2019-04-21 RX ORDER — FUROSEMIDE 10 MG/ML
40 INJECTION INTRAMUSCULAR; INTRAVENOUS ONCE
Status: COMPLETED | OUTPATIENT
Start: 2019-04-21 | End: 2019-04-21

## 2019-04-21 RX ORDER — HYDROXYZINE HYDROCHLORIDE 25 MG/1
50 TABLET, FILM COATED ORAL 4 TIMES DAILY PRN
Status: DISCONTINUED | OUTPATIENT
Start: 2019-04-21 | End: 2019-05-15

## 2019-04-21 RX ADMIN — INSULIN LISPRO 2 UNITS: 100 INJECTION, SOLUTION INTRAVENOUS; SUBCUTANEOUS at 15:31

## 2019-04-21 RX ADMIN — METRONIDAZOLE 500 MG: 500 INJECTION, SOLUTION INTRAVENOUS at 20:43

## 2019-04-21 RX ADMIN — CHLORHEXIDINE GLUCONATE 0.12% ORAL RINSE 15 ML: 1.2 LIQUID ORAL at 20:44

## 2019-04-21 RX ADMIN — POTASSIUM BICARBONATE 40 MEQ: 782 TABLET, EFFERVESCENT ORAL at 07:03

## 2019-04-21 RX ADMIN — IPRATROPIUM BROMIDE 0.5 MG: 0.5 SOLUTION RESPIRATORY (INHALATION) at 11:18

## 2019-04-21 RX ADMIN — AMIODARONE HYDROCHLORIDE 0.5 MG/MIN: 1.8 INJECTION, SOLUTION INTRAVENOUS at 19:51

## 2019-04-21 RX ADMIN — METHYLPREDNISOLONE SODIUM SUCCINATE 30 MG: 40 INJECTION, POWDER, FOR SOLUTION INTRAMUSCULAR; INTRAVENOUS at 12:43

## 2019-04-21 RX ADMIN — METRONIDAZOLE 500 MG: 500 INJECTION, SOLUTION INTRAVENOUS at 12:00

## 2019-04-21 RX ADMIN — Medication 10 ML: at 15:33

## 2019-04-21 RX ADMIN — LEVOFLOXACIN 500 MG: 5 INJECTION, SOLUTION INTRAVENOUS at 14:49

## 2019-04-21 RX ADMIN — VENLAFAXINE 37.5 MG: 37.5 TABLET ORAL at 14:50

## 2019-04-21 RX ADMIN — METOPROLOL TARTRATE 10 MG: 5 INJECTION INTRAVENOUS at 04:10

## 2019-04-21 RX ADMIN — Medication 10 ML: at 20:43

## 2019-04-21 RX ADMIN — METRONIDAZOLE 500 MG: 500 INJECTION, SOLUTION INTRAVENOUS at 04:14

## 2019-04-21 RX ADMIN — MINERAL OIL AND WHITE PETROLATUM: 150; 830 OINTMENT OPHTHALMIC at 04:16

## 2019-04-21 RX ADMIN — CALCIUM GLUCONATE: 94 INJECTION, SOLUTION INTRAVENOUS at 18:12

## 2019-04-21 RX ADMIN — Medication 10 ML: at 15:30

## 2019-04-21 RX ADMIN — MINERAL OIL AND WHITE PETROLATUM: 150; 830 OINTMENT OPHTHALMIC at 20:43

## 2019-04-21 RX ADMIN — IPRATROPIUM BROMIDE 0.5 MG: 0.5 SOLUTION RESPIRATORY (INHALATION) at 19:24

## 2019-04-21 RX ADMIN — AMIODARONE HYDROCHLORIDE 0.5 MG/MIN: 1.8 INJECTION, SOLUTION INTRAVENOUS at 07:04

## 2019-04-21 RX ADMIN — I.V. FAT EMULSION 100 ML: 20 EMULSION INTRAVENOUS at 18:07

## 2019-04-21 RX ADMIN — LEVALBUTEROL 1.25 MG: 1.25 SOLUTION, CONCENTRATE RESPIRATORY (INHALATION) at 23:36

## 2019-04-21 RX ADMIN — POLYVINYL ALCOHOL 1 DROP: 14 SOLUTION/ DROPS OPHTHALMIC at 20:43

## 2019-04-21 RX ADMIN — Medication 10 ML: at 11:33

## 2019-04-21 RX ADMIN — MINERAL OIL AND WHITE PETROLATUM: 150; 830 OINTMENT OPHTHALMIC at 01:05

## 2019-04-21 RX ADMIN — METHYLPREDNISOLONE SODIUM SUCCINATE 30 MG: 40 INJECTION, POWDER, FOR SOLUTION INTRAMUSCULAR; INTRAVENOUS at 07:04

## 2019-04-21 RX ADMIN — IPRATROPIUM BROMIDE 0.5 MG: 0.5 SOLUTION RESPIRATORY (INHALATION) at 23:36

## 2019-04-21 RX ADMIN — IPRATROPIUM BROMIDE 0.5 MG: 0.5 SOLUTION RESPIRATORY (INHALATION) at 08:38

## 2019-04-21 RX ADMIN — DIGOXIN 125 MCG: 0.25 INJECTION INTRAMUSCULAR; INTRAVENOUS at 14:50

## 2019-04-21 RX ADMIN — PANTOPRAZOLE SODIUM 40 MG: 40 INJECTION, POWDER, FOR SOLUTION INTRAVENOUS at 11:33

## 2019-04-21 RX ADMIN — LEVALBUTEROL 1.25 MG: 1.25 SOLUTION, CONCENTRATE RESPIRATORY (INHALATION) at 08:39

## 2019-04-21 RX ADMIN — POLYVINYL ALCOHOL 1 DROP: 14 SOLUTION/ DROPS OPHTHALMIC at 04:16

## 2019-04-21 RX ADMIN — LEVALBUTEROL 1.25 MG: 1.25 SOLUTION, CONCENTRATE RESPIRATORY (INHALATION) at 11:18

## 2019-04-21 RX ADMIN — VANCOMYCIN HYDROCHLORIDE 1000 MG: 1 INJECTION, POWDER, LYOPHILIZED, FOR SOLUTION INTRAVENOUS at 03:04

## 2019-04-21 RX ADMIN — METHYLPREDNISOLONE SODIUM SUCCINATE 30 MG: 40 INJECTION, POWDER, FOR SOLUTION INTRAMUSCULAR; INTRAVENOUS at 20:43

## 2019-04-21 RX ADMIN — FUROSEMIDE 40 MG: 10 INJECTION, SOLUTION INTRAMUSCULAR; INTRAVENOUS at 18:07

## 2019-04-21 RX ADMIN — ENOXAPARIN SODIUM 40 MG: 100 INJECTION SUBCUTANEOUS at 18:09

## 2019-04-21 RX ADMIN — LEVALBUTEROL 1.25 MG: 1.25 SOLUTION, CONCENTRATE RESPIRATORY (INHALATION) at 04:38

## 2019-04-21 RX ADMIN — HYDROXYZINE HYDROCHLORIDE 50 MG: 25 TABLET, FILM COATED ORAL at 11:33

## 2019-04-21 RX ADMIN — INSULIN LISPRO 2 UNITS: 100 INJECTION, SOLUTION INTRAVENOUS; SUBCUTANEOUS at 03:28

## 2019-04-21 RX ADMIN — LEVALBUTEROL 1.25 MG: 1.25 SOLUTION, CONCENTRATE RESPIRATORY (INHALATION) at 19:25

## 2019-04-21 RX ADMIN — VENLAFAXINE 37.5 MG: 37.5 TABLET ORAL at 20:43

## 2019-04-21 RX ADMIN — LEVALBUTEROL 1.25 MG: 1.25 SOLUTION, CONCENTRATE RESPIRATORY (INHALATION) at 15:31

## 2019-04-21 RX ADMIN — POLYVINYL ALCOHOL 1 DROP: 14 SOLUTION/ DROPS OPHTHALMIC at 01:05

## 2019-04-21 RX ADMIN — IPRATROPIUM BROMIDE 0.5 MG: 0.5 SOLUTION RESPIRATORY (INHALATION) at 15:31

## 2019-04-21 RX ADMIN — IPRATROPIUM BROMIDE 0.5 MG: 0.5 SOLUTION RESPIRATORY (INHALATION) at 04:38

## 2019-04-21 ASSESSMENT — ENCOUNTER SYMPTOMS
DIARRHEA: 0
CONSTIPATION: 0
CHEST TIGHTNESS: 0
SORE THROAT: 0
EYE REDNESS: 0
APNEA: 0
ABDOMINAL DISTENTION: 0
SHORTNESS OF BREATH: 0
STRIDOR: 0
ABDOMINAL PAIN: 1
VOMITING: 0
COUGH: 0
EYE DISCHARGE: 0
WHEEZING: 0
NAUSEA: 0

## 2019-04-21 ASSESSMENT — PAIN SCALES - GENERAL
PAINLEVEL_OUTOF10: 0

## 2019-04-21 ASSESSMENT — PULMONARY FUNCTION TESTS: PEFR_L/MIN: 18

## 2019-04-21 NOTE — PLAN OF CARE
Problem: Restraint Use - Nonviolent/Non-Self-Destructive Behavior:  Goal: Absence of restraint indications  Description  Absence of restraint indications   4/21/2019 1923 by Nolberto Favre, RN  Outcome: Ongoing  4/21/2019 1923 by Nolberto Favre, RN  Reactivated     Problem: Restraint Use - Nonviolent/Non-Self-Destructive Behavior:  Goal: Absence of restraint-related injury  Description  Absence of restraint-related injury   4/21/2019 1923 by Nolberto Favre, RN  Outcome: Ongoing  4/21/2019 1923 by Nolberto Favre, RN  Reactivated

## 2019-04-21 NOTE — CARE COORDINATION
ONGOING DISCHARGE PLAN:    LSW following for possible return to [facility]. Remains on IV Levaquin, IV solu medrol, IV flagyl, IV Vancomycin, Amiodarone gtt, Iv fluids, TPN, levophed gtt. Surgery following for cholecystectomy verses cholecystostomy tube placement. Will continue to follow for additional discharge needs.     Electronically signed by Gerry Chun RN on 4/21/2019 at 1:08 PM

## 2019-04-21 NOTE — PROGRESS NOTES
Pulmonary Progress Note  [facility]      Patient - Mo Gonsalez,  Age - 77 y.o.    - 1950      Room Number -    MRN -  [id number]   Acct # - [de-identified]  Date of Admission -  2019  2:48 PM    Consulting Ele Monterroso MD  Primary Care Physician - Maggy Gates DO     SUBJECTIVE   Patient appears to be no distress. Tolerated extubation well . Appears weak. OBJECTIVE   VITALS    height is 5' (1.524 m) and weight is 130 lb 11.7 oz (59.3 kg). Her temperature is 98.5 °F (36.9 °C). Her blood pressure is 120/53 (abnormal) and her pulse is 123. Her respiration is 24 and oxygen saturation is 96%. Body mass index is 25.53 kg/m². Temperature Range: Temp: 98.5 °F (36.9 °C) Temp  Av.1 °F (36.7 °C)  Min: 97.9 °F (36.6 °C)  Max: 98.5 °F (36.9 °C)  BP Range:  Systolic (74PLI), [place] , Min:75 , GMO:953     Diastolic (27DYE), TJU:86, Min:47, Max:140    Pulse Range: Pulse  Av.7  Min: 78  Max: 150  Respiration Range: Resp  Av.9  Min: 14  Max: 34  Current Pulse Ox[de-identified]  SpO2: 96 %  24HR Pulse Ox Range:  SpO2  Av.5 %  Min: 82 %  Max: 100 %  Oxygen Amount and Delivery: O2 Flow Rate (L/min): 2 L/min    Wt Readings from Last 3 Encounters:   19 130 lb 11.7 oz (59.3 kg)   19 89 lb (40.4 kg)   19 94 lb 1.6 oz (42.7 kg)       I/O (24 Hours)    Intake/Output Summary (Last 24 hours) at 2019 1535  Last data filed at 2019 1200  Gross per 24 hour   Intake 4161.71 ml   Output 5800 ml   Net -1638.29 ml     EXAM     General Appearance  Awake, alert, oriented, in no acute distress  HEENT - normocephalic, atraumatic. Neck - Supple,  trachea midline   Lungs - decreased breath sounds at the bases bilaterally  Heart Exam:PMI normal. No lifts, heaves, or thrills. RRR. No murmurs, clicks, gallops, or rubs  Abdomen Exam: Abdomen soft, non-tender.  BS normal.  Extremity Exam: No edema    MEDS      on 4/21/2019 at 3:35 PM

## 2019-04-21 NOTE — PLAN OF CARE
Problem: Risk for Impaired Skin Integrity  Goal: Tissue integrity - skin and mucous membranes  Description  Structural intactness and normal physiological function of skin and  mucous membranes. 4/21/2019 1922 by Stacy Sotomayor RN  Outcome: Ongoing  4/21/2019 0540 by Rossy Erickson RN  Outcome: Ongoing  Note:   Skin assessment complete. Alternating air pressure mattress in place. Coccyx reddened. Sensicare applied PRN. Turned and repositioned every two hours. Area kept free from moisture. Proper nourishment and fluids encouraged, as appropriate. Will continue to monitor for additional needs and changes in skin breakdown. Problem: Falls - Risk of:  Goal: Will remain free from falls  Description  Will remain free from falls  4/21/2019 1922 by Stacy Sotomayor RN  Outcome: Ongoing  4/21/2019 0540 by Rossy Erickson RN  Outcome: Ongoing  Note:   Pt assessed as a fall risk this shift. Remains free from falls and accidental injury at this time. Fall precautions in place, including falling star sign and fall risk band on pt. Floor free from obstacles, and bed is locked and in lowest position. Adequate lighting provided. Pt encouraged to call before getting OOB for any need. Bed alarm activated. Will continue to monitor needs during hourly rounding, and reinforce education on use of call light. Goal: Absence of physical injury  Description  Absence of physical injury  Outcome: Ongoing     Problem: Infection, Septic Shock:  Goal: Will show no infection signs and symptoms  Description  Will show no infection signs and symptoms  4/21/2019 1922 by Stacy Sotomayor RN  Outcome: Ongoing  4/21/2019 0540 by Rossy Erickson RN  Outcome: Ongoing  Note:   Patient remains afebrile; WBC count is 31.8; no signs of erythema, edema, or warmth. Will continue to monitor for signs/symptoms of infection.        Problem: Tissue Perfusion, Altered:  Goal: Circulatory function within specified parameters  Description  Circulatory function within specified parameters  4/21/2019 1922 by Azalea Stafford RN  Outcome: Ongoing  4/21/2019 0540 by Mariann Bustos RN  Outcome: Ongoing  Note:   Vitals:    04/21/19 0445 04/21/19 0500 04/21/19 0515 04/21/19 0530   BP: (!) 146/87 104/63 (!) 81/51 (!) 152/92   Pulse: 89 80 78 90   Resp: 19 16 16 14   Temp: 98.5 °F (36.9 °C)      TempSrc:       SpO2: 100% 97% 99% 96%   Weight:       Height:         Pt ST with PAC/PVC occasional; adequate urine output; will continue to monitor tissue perfusion. Problem: Pain:  Goal: Pain level will decrease  Description  Pain level will decrease  4/21/2019 1922 by Azalea Stafford RN  Outcome: Ongoing  4/21/2019 0540 by Mariann Bustos RN  Outcome: Ongoing  Note:   No pain at this time. 0/10 pain scale.     Goal: Control of acute pain  Description  Control of acute pain  Outcome: Ongoing  Goal: Control of chronic pain  Description  Control of chronic pain  Outcome: Ongoing

## 2019-04-21 NOTE — PROGRESS NOTES
[address]    PROGRESS NOTE             4/21/2019    4:25 PM    Name:   Ned Noriega  MRN:     [id number]     Acct:      [de-identified]   Room:   2002/2002-01  IP Day:  10  Admit Date:  4/11/2019  2:48 PM    PCP:  Lucas Valente DO  Code Status:  Full Code    Subjective: Interval History Status: improved. Patient seen and examined at bedside in the ICU. Afebrile, VSS. Patient appears better today visually. Alert and awake. Overnight apparently the patient was found pulling at lines. Mitts were placed accordingly and was redirected. RUQ pain has significantly decreased - HIDA scan yesterday was not able to visualize the GB. WBC trending down. Afebrile. Tachycardic into the 150's overnight, required Amiodarone drip and an increase in PRN lopressor. This AM HR <102, SBP controlled. I do not believe she is medically stable enough still at this time for the OR. Repeat Blood cx -ve @ 3 days    CXR stable in interval.     Prealbumin 11.8      Brief History:     Per EMR:     The patient is a 77 y.o.  Female who presents withAbdominal Pain   and she is admitted to the hospital for the management of abdominal distension, nausea, vomiting, and acute cystitis.      Patient presents with chills, nausea, vomiting, abdominal pain (diffuse, but worse in the suprapubic region), abdominal distension, and dysuria of 2-3 days duration. Denies hematemesis. Acknowledges difficulty keeping food down but tolerating fluids. States abdominal pain is a 6/10 on average, and denies trying any medication to alleviate her sx.      Denies recent antibiotic use or steroid use.      ED: Tachycardic 100-114 BP, WBC 47.9 w/neutrophils 88,  UCx from 4/2 + E. Coli > 100,000 w/suceptibility to cipro. Review of Systems:     Review of Systems   Constitutional: Negative for activity change, appetite change, chills, diaphoresis, fatigue and fever. HENT: Negative for sore throat. Eyes: Negative for discharge and redness. Respiratory: Negative for apnea, cough, chest tightness, shortness of breath, wheezing and stridor. Cardiovascular: Negative for leg swelling. Chest pain: Left-sided. Gastrointestinal: Positive for abdominal pain. Negative for abdominal distention, constipation, diarrhea, nausea and vomiting. Genitourinary: Negative for difficulty urinating, dysuria, flank pain, frequency, hematuria and urgency. Musculoskeletal: Negative for arthralgias. Neurological: Negative for dizziness, tremors, seizures, syncope, facial asymmetry, speech difficulty, weakness, light-headedness, numbness and headaches. Hematological: Negative for adenopathy. Medications: Allergies: Allergies   Allergen Reactions    Penicillins Shortness Of Breath and Rash    Amoxicillin        Current Meds:   Scheduled Meds:    furosemide  40 mg Intravenous Once    [START ON 4/22/2019] furosemide  40 mg Intravenous Daily    methylPREDNISolone  30 mg Intravenous Q8H    metroNIDAZOLE  500 mg Intravenous Q8H    magnesium sulfate  1 g Intravenous Once    pantoprazole  40 mg Intravenous Daily    And    sodium chloride (PF)  10 mL Intravenous Daily    sodium chloride  250 mL Intravenous Once    polyvinyl alcohol  1 drop Both Eyes Q4H    And    lubrifresh P.M.    Both Eyes Q4H    chlorhexidine  15 mL Mouth/Throat BID    ipratropium  0.5 mg Nebulization Q4H    insulin lispro  0-12 Units Subcutaneous Q6H    levalbuterol  1.25 mg Nebulization Q4H    docusate sodium  100 mg Oral TID    levofloxacin  500 mg Intravenous Q24H    venlafaxine  37.5 mg Oral TID    clopidogrel  75 mg Oral Daily    aspirin  81 mg Oral Daily    sodium chloride flush  10 mL Intravenous 2 times per day    enoxaparin  40 mg Subcutaneous Daily     Continuous Infusions:    PN-Adult 2-in-1 Central Line (Standard)      dextrose Stopped (04/20/19 1953)    PN-Adult 2-in-1 Central Line (Standard) 65 mL/hr at 19 1953    Amiodarone 0.5 mg/min (19 0704)    propofol Stopped (19 1030)    norepinephrine Stopped (19)    dextrose       PRN Meds: hydrOXYzine, sodium chloride flush, metoprolol, fentanNYL, sodium chloride nebulizer, fentanNYL, oxyCODONE-acetaminophen, magnesium sulfate, sodium phosphate IVPB **OR** sodium phosphate IVPB, potassium chloride **OR** potassium alternative oral replacement **OR** potassium chloride, glucose, dextrose, glucagon (rDNA), dextrose, milk and molasses, sodium chloride flush, acetaminophen    Data:     Past Medical History:   has a past medical history of Abnormal computed tomography of cervical spine, CVA (cerebral vascular accident) ([address] GERD (gastroesophageal reflux disease), Hypertension, Paraproteinemia, and Weight loss. Social History:   reports that she has been smoking. She has a 30.00 pack-year smoking history. She has never used smokeless tobacco. She reports that she drank about 16.8 oz of alcohol per week. She reports that she does not use drugs. Family History: History reviewed. No pertinent family history. Vitals:  /81   Pulse 119   Temp 96.6 °F (35.9 °C) (Axillary)   Resp 24   Ht 5' (1.524 m)   Wt 130 lb 11.7 oz (59.3 kg)   SpO2 96%   BMI 25.53 kg/m²   Temp (24hrs), Av °F (36.7 °C), Min:96.6 °F (35.9 °C), Max:98.5 °F (36.9 °C)    Recent Labs     19  2151 19  0326 19  1105 19  1458   POCGLU 159* 153* 140* 146*       I/O(24Hr):     Intake/Output Summary (Last 24 hours) at 2019 1625  Last data filed at 2019 1200  Gross per 24 hour   Intake 4161.71 ml   Output 3725 ml   Net 436.71 ml       Labs:    [unfilled]    Lab Results   Component Value Date/Time    SPECIAL  line 5cc red 5cc purple 2019 10:20 AM     Lab Results   Component Value Date/Time    CULTURE NO GROWTH 3 DAYS 2019 10:20 AM       [unfilled]    Radiology:    Lan Alexey Femur Left (min 2 Views)    Result Date: 3/27/2019  EXAMINATION: 4 XRAY VIEWS OF THE LEFT FEMUR; 2 XRAY VIEWS OF THE LEFT KNEE; 3 XRAY VIEWS OF THE LEFT TIBIA AND FIBULA 3/27/2019 7:00 pm COMPARISON: Left hip 11/14/2015. HISTORY: ORDERING SYSTEM PROVIDED HISTORY: pain TECHNOLOGIST PROVIDED HISTORY: pain Ordering Physician Provided Reason for Exam: pt twisted wrong, lt knee pain, unable to straighten knee. Acuity: Acute Type of Exam: Initial FINDINGS: Left femur, four views: The bones are diffusely osteopenic. No acute fracture deformity. The hip joint is maintained. The femoral head projects within the acetabulum. No focal soft tissue abnormality is seen. Left knee, two views: The knee is flexed. No acute osseous abnormality. No joint effusion. Joint spaces are not optimally profiled but no significant arthritic changes are apparent. Left tib-fib, three views: There is evidence of a remote healed distal tibia fracture. No acute fracture or dislocation is seen. No focal soft tissue abnormality. No acute osseous abnormality of the left femur. No acute osseous abnormality of the left knee. No acute osseous abnormality of the left tib-fib. Healed remote distal tibia fracture. Marked osteopenia, likely due to disuse. Xr Knee Left (1-2 Views)    Result Date: 3/27/2019  EXAMINATION: 4 XRAY VIEWS OF THE LEFT FEMUR; 2 XRAY VIEWS OF THE LEFT KNEE; 3 XRAY VIEWS OF THE LEFT TIBIA AND FIBULA 3/27/2019 7:00 pm COMPARISON: Left hip 11/14/2015. HISTORY: ORDERING SYSTEM PROVIDED HISTORY: pain TECHNOLOGIST PROVIDED HISTORY: pain Ordering Physician Provided Reason for Exam: pt twisted wrong, lt knee pain, unable to straighten knee. Acuity: Acute Type of Exam: Initial FINDINGS: Left femur, four views: The bones are diffusely osteopenic. No acute fracture deformity. The hip joint is maintained. The femoral head projects within the acetabulum. No focal soft tissue abnormality is seen. Left knee, two views: The knee is flexed.   No acute osseous abnormality. No joint effusion. Joint spaces are not optimally profiled but no significant arthritic changes are apparent. Left tib-fib, three views: There is evidence of a remote healed distal tibia fracture. No acute fracture or dislocation is seen. No focal soft tissue abnormality. No acute osseous abnormality of the left femur. No acute osseous abnormality of the left knee. No acute osseous abnormality of the left tib-fib. Healed remote distal tibia fracture. Marked osteopenia, likely due to disuse. Xr Knee Left (3 Views)    Result Date: 3/30/2019  EXAMINATION: 3 XRAY VIEWS OF THE LEFT KNEE 3/30/2019 10:58 am COMPARISON: March 27, 2018 HISTORY: ORDERING SYSTEM PROVIDED HISTORY: F/u L knee pain after cast TECHNOLOGIST PROVIDED HISTORY: AP, oblique, lateral F/u L knee pain after cast FINDINGS: Marked osteopenia. Interval casting, which degrades fine osseous detail question cortical offset in the lateral femoral metaphysis. No malalignment identified. No significant joint effusion. Interval casting. Question nondisplaced fracture in the lateral femoral metaphysis. Osteopenia. Xr Tibia Fibula Left (2 Views)    Result Date: 3/27/2019  EXAMINATION: 4 XRAY VIEWS OF THE LEFT FEMUR; 2 XRAY VIEWS OF THE LEFT KNEE; 3 XRAY VIEWS OF THE LEFT TIBIA AND FIBULA 3/27/2019 7:00 pm COMPARISON: Left hip 11/14/2015. HISTORY: ORDERING SYSTEM PROVIDED HISTORY: pain TECHNOLOGIST PROVIDED HISTORY: pain Ordering Physician Provided Reason for Exam: pt twisted wrong, lt knee pain, unable to straighten knee. Acuity: Acute Type of Exam: Initial FINDINGS: Left femur, four views: The bones are diffusely osteopenic. No acute fracture deformity. The hip joint is maintained. The femoral head projects within the acetabulum. No focal soft tissue abnormality is seen. Left knee, two views: The knee is flexed. No acute osseous abnormality. No joint effusion.   Joint spaces are not optimally profiled but no and colon distal to the stomach. No small bowel obstruction. Pelvis: Marked distention of the urinary bladder is noted. 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 Distended ureters may be related to reflux or infection. No free pelvic fluid, pelvic or inguinal adenopathy is noted. Peritoneum/Retroperitoneum: No aortic aneurysm. Mild to moderate plaque is noted in the infrarenal abdominal aorta and both proximal iliac arteries. Shotty lymph nodes are present around the aorta in the upper abdomen. No mesenteric adenopathy is noted. Bones/Soft Tissues: Degenerative changes are present in the hips and lower lumbar facets. Estimated biologic radiation dose for this procedure:258.77 mGy/cm2.     1. Dilatation of the thoracic esophagus filled with fluid. No obstructive process is noted. No adjacent enlarged lymph nodes. 2. Trace pleural fluid. 3. Marked distention of the stomach. This appears to extend to the pylorus. Partial gastric outlet obstruction is not excluded. 4. Bilateral dilated ureters without obstructing calculi. Urinary bladder is markedly distended with bladder wall air suggesting emphysematous cystitis. Dilatation of the ureters may be related to reflux or infection. 5. Atherosclerotic disease. 6. Other findings as above. Critical results were called by Dr. Aubrie Mulligan MD to [address] on 4/11/2019 at 17:37. Xr Chest Portable    Result Date: 4/12/2019  EXAMINATION: SINGLE XRAY VIEW OF THE CHEST 4/12/2019 5:26 am COMPARISON: November 14, 2015. HISTORY: ORDERING SYSTEM PROVIDED HISTORY: line placement TECHNOLOGIST PROVIDED HISTORY: line placement Ordering Physician Provided Reason for Exam: New right side line placement. Acuity: Acute Type of Exam: Initial Additional signs and symptoms: New right side line placement. FINDINGS: Stable left pectoral trans venous cardiac pacer device.   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 Coli Emphysematous Cystitis + MRSA PNA   WBC 17-->22.4-->27.5-->31.8-->27.2   Levaquin, flagyl   CXR multifocal airspace disease, left basilar consolidation/effusion   ECHO 4/12: LVEF 45%, RVSP 36 mmHg   Urology consult, appreciate recs --> cont cath until out of ICU and  stable   Critical care consult, appreciate recs   Gen Surg, right IJ central line placed on 4/12   ID consult, appreciate recs   Digoxin 125 mcg   Amiodarone gtt to keep HR <100   Fluid overloaded - lasix 40 qD    Acute Cholecystitis  U/S suggest acute ashley  HIDA did not visualze GB  WBC 17-->22.4-->27.5-->31.8-->27.2  GS consulted - appreciate recommendations  Patient not medically cleared at this time for the OR, unless emergently needs   Surgical intervention - consider tube drainage     GI Malignancy ruled out by EGD, likely presenting w/Gastroparesis   Hx of BMs during this admission   Persistent clinical abdominal distension and pain   EGD: esophagitis, hiatal hernia, solid food in 75% stomach -->  possible gastroparesis   Pepcid switched to Protonix, per GI recs   TPN   Multiple tumor markers mildly elevated --> will wait to consider  heme/onc consult, markers likely elevated 2/2 sepsis   Reglan started on 4/17 --> Daily ECG   Considering systemic autonomic dysfunction as overlying diagnosis  (gastroparesis, neurogenic bladder, hypotension/fluctuating BPs)     Anemia, likely 2/2 bleeding   Transfusion 1 U RBC on 4/14   Transfusion 2 U RBC on 4/16    Restarted Lovenox, INR wNL    Hypokalemia   Potassium sliding scale    Hypophosphatemia   Sodium phosphate to correct    Left knee remote distal tibia fx w/osteopenia   Ortho replaced brace    Maintenance   Lovenox   Dulera, Xopenex   Continue home meds trazodone, ASA, Plavix, Norvasc, Effexor, Spiriva     Dispo   Not cleared for OR today due to volume overload- will diurese and  reassess tomorrow    PT/OT Eval and treat   Social work for [address] [place] [address]  4/21/2019  4:25 PM     Attending Physician Statement  Patient seen and examined  I have discussed the care of the patient, including pertinent history and exam findings,  with the resident. I have reviewed the key elements of all parts of the encounter with the resident. I agree with the assessment, plan and orders as documented by the resident.     Samir Kapoor  Electronically signed by Tino Madera on 4/21/2019 at 4:25 PM

## 2019-04-21 NOTE — PROGRESS NOTES
General Surgery Progress Note            PATIENT NAME: Clark Ledesma     TODAY'S DATE: 4/21/2019, 5:52 PM    SUBJECTIVE/OBJECTIVE:      VITALS:  /81   Pulse 119   Temp 96.6 °F (35.9 °C) (Axillary)   Resp 24   Ht 5' (1.524 m)   Wt 130 lb 11.7 oz (59.3 kg)   SpO2 96%   BMI 25.53 kg/m²      Patient seen and examined  Awake. Alert and oriented ×3  Heart is tachycardia rate and rhythm  Lungs diminished  Abdomen is soft. Nondistended.   Tender in the right upper quadrant  Patient remains on TPN    INTAKE/OUTPUT:      Intake/Output Summary (Last 24 hours) at 4/21/2019 1752  Last data filed at 4/21/2019 1200  Gross per 24 hour   Intake 4161.71 ml   Output 3725 ml   Net 436.71 ml       CBC with Differential:    Lab Results   Component Value Date    WBC 27.2 04/21/2019    RBC 4.02 04/21/2019    RBC 3.66 05/23/2012    HGB 12.0 04/21/2019    HCT 37.3 04/21/2019     04/21/2019     05/23/2012    MCV 93.0 04/21/2019    MCH 29.9 04/21/2019    MCHC 32.1 04/21/2019    RDW 15.1 04/21/2019    METASPCT 2 04/11/2019    LYMPHOPCT 4 04/11/2019    MONOPCT 2 04/11/2019    BASOPCT 0 04/11/2019    MONOSABS 0.96 04/11/2019    LYMPHSABS 1.92 04/11/2019    EOSABS 0.00 04/11/2019    BASOSABS 0.00 04/11/2019    DIFFTYPE NOT REPORTED 04/11/2019     CMP:    Lab Results   Component Value Date     04/21/2019    K 3.5 04/21/2019    CL 95 04/21/2019    CO2 28 04/21/2019    BUN 15 04/21/2019    CREATININE <0.40 04/21/2019    GFRAA CANNOT BE CALCULATED 04/21/2019    LABGLOM CANNOT BE CALCULATED 04/21/2019    GLUCOSE 152 04/21/2019    GLUCOSE 87 05/21/2012    PROT 4.6 04/21/2019    LABALBU 2.2 04/21/2019    LABALBU 4.6 05/17/2012    CALCIUM 7.3 04/21/2019    BILITOT 0.47 04/21/2019    ALKPHOS 111 04/21/2019    AST 41 04/21/2019    ALT 27 04/21/2019     PT/INR:    Lab Results   Component Value Date    PROTIME 14.5 04/21/2019    PROTIME 10.9 03/28/2012    INR 1.1 04/21/2019           ASSESSMENT/PLAN:    Principal

## 2019-04-21 NOTE — PROGRESS NOTES
Pt off unit with Cass Palacio RN, Ji Castellanos RN, and Purcell Siemens, RN at various times for HIDA Scan throughout this shift. Transported on monitor.     Electronically signed by Stacy Sotomayor RN on 4/20/2019 at 8:26 PM

## 2019-04-21 NOTE — PLAN OF CARE
Nutrition Problem: Inadequate oral intake  Intervention: Food and/or Nutrient Delivery: Continue current diet, Modify current Parenteral Nutrition  Nutritional Goals: PN to meet greater 90% of estimated nutrition needs

## 2019-04-21 NOTE — PROGRESS NOTES
[facility]    Gastroenterology Daily Progress Note      Patient:   Petrona Kline   :    1950   Facility:   [address] [place] [address]  Date:     2019  Consultant:   Manuel Rose, CNP      SUBJECTIVE  77 y.o. female admitted 2019 with Sepsis due to urinary tract infection ([facility] [A41.9, N39.0]  Cystitis [N30.90]  Cystitis [N30.90] and seen for gastric distention and ileus. The pt was seen and examined. She reports bilateral upper abdominal pain. No NG drainage overnight. HIIDA showed absent gallbladder filling. Nursing reports two bowel movements overnight. Her lipase, amylase and WBC remain elevated. OBJECTIVE  Scheduled Meds:   methylPREDNISolone  30 mg Intravenous Q8H    metroNIDAZOLE  500 mg Intravenous Q8H    magnesium sulfate  1 g Intravenous Once    pantoprazole  40 mg Intravenous Daily    And    sodium chloride (PF)  10 mL Intravenous Daily    sodium chloride  250 mL Intravenous Once    vancomycin  1,000 mg Intravenous Q12H    polyvinyl alcohol  1 drop Both Eyes Q4H    And    lubrifresh P.M.    Both Eyes Q4H    chlorhexidine  15 mL Mouth/Throat BID    ipratropium  0.5 mg Nebulization Q4H    insulin lispro  0-12 Units Subcutaneous Q6H    levalbuterol  1.25 mg Nebulization Q4H    docusate sodium  100 mg Oral TID    vancomycin (VANCOCIN) intermittent dosing (placeholder)   Other RX Placeholder    levofloxacin  500 mg Intravenous Q24H    venlafaxine  37.5 mg Oral TID    clopidogrel  75 mg Oral Daily    aspirin  81 mg Oral Daily    sodium chloride flush  10 mL Intravenous 2 times per day    enoxaparin  40 mg Subcutaneous Daily       Vital Signs:  BP (!) 144/129   Pulse 100   Temp 98.5 °F (36.9 °C)   Resp 21   Ht 5' (1.524 m)   Wt 130 lb 11.7 oz (59.3 kg)   SpO2 97%   BMI 25.53 kg/m²      Physical Exam:   General appearance: Alert & oriented, NAD  Lungs: CTA bilaterally    Heart: S1S2 RRR  Abdomen: Soft, bilateral upper quads are mildly tender, Not distended, BS WNL NG clamped  Skin: No jaundice, No clubbing, No cyanosis    Lab and Imaging Review     CBC  Recent Labs     04/19/19  0341  04/20/19  0355 04/20/19  1618 04/21/19  0418   WBC 27.5*  --  31.8*  --  27.2*   HGB 11.0*   < > 12.8 12.6 12.0   HCT 33.2*   < > 39.5 38.5 37.3   MCV 91.1  --  91.9  --  93.0     --  273  --  215    < > = values in this interval not displayed. Results for Janet Barrett (MRN [id number]) as of 4/20/2019 09:15    Ref. Range 4/15/2019 11:10    Latest Ref Range: <38 U/mL 62 (H)   CA 19-9 Latest Ref Range: 0 - 35 U/mL 49 (H)   CEA Latest Ref Range: <3.9 ng/mL 5.6 (H)      FINDINGS:ct abd 4/14/19   Lower Chest: New moderate layering bilateral pleural effusions with bilateral   lower lobe atelectasis.       Organs: Limited evaluation due lack of intravenous contrast.  Cholelithiasis   redemonstrated.  No gallbladder wall thickening or biliary ductal dilatation. Scattered tiny hypodense lesions in the liver are too small to characterize   but statistically represent benign cysts or hemangiomas and appear unchanged. The pancreas, spleen, adrenal glands, and kidneys are unremarkable. Guillermo Bearded is   no hydronephrosis or urinary tract calculus.       GI/Bowel: The stomach is distended.  Enteric tube is in place.  No contrast   is seen distal to the pylorus and there is contrast reflux into the distal   esophagus. Guillermo Bearded is no evidence of bowel obstruction.  The appendix is not   definitely visualized.  No focal pericecal inflammatory changes are evident.       Pelvis: The urinary bladder is decompressed by Tran catheter.  No pelvic   mass is seen.       Peritoneum/Retroperitoneum: Small amount of free fluid in the pelvic cavity. No free air or focal fluid collection.  No abnormal lymph node.  Normal   abdominal aortic caliber.  Moderate calcific atherosclerosis.       Bones/Soft Tissues: No acute osseous abnormality.  Diffuse anasarca.    Moderate degenerative changes in the lumbar spine.           Impression   1. Distended, contrast filled stomach with reflux of contrast into the   esophagus.  No enteric contrast is seen distal to the pylorus suggesting   delayed gastric emptying in the setting of ileus. 2. New moderate layering bilateral pleural effusions with bilateral lower   lobe atelectasis. 3. Small amount of nonspecific free fluid in the pelvic cavity. 4. Anasarca. 5. Cholelithiasis.        FINDINGS:GB US 4/19/19   Pancreas is not well visualized.       No liver lesion.       Gallbladder wall thickening.  Gallbladder sludge.  Cholelithiasis. Pericholecystic fluid.       Common bile duct measures at the upper limits of normal at 7 mm.           Impression   Findings suggesting acute cholecystitis.      ESOPHAGOGASTRODUODENOSCOPY   ( EGD )  DATE OF PROCEDURE: 4/16/2019      Pilar Rosenthal MD        POSTOPERATIVE Kelin Varghese has esophagitis.     Has a large amount of retained solid food in the upper part of the stomach and fundus.  Antrum is clear.  Pylorus wide open and did not show signs of stenosis.     OPERATION: Upper GI endoscopy          Findings:     Retropharyngeal area was grossly normal appearing     Esophagus: abnormal: Has a grade 2 esophagitis.  Appears peptic esophagitis.  Has a small sliding hiatal hernia.     Stomach:  Fundus of the stomach and body of the stomach.  With solid food.  75% of the lumen is occupied with solid food.     Antrum: No retained food.  Able to advance the scope through the pylorus without difficulty.  No pyloric stenosis seen.  No signs of outlet obstruction.     Duodenum:     Descending: normal    Bulb: normal  Minimal liquid present in the 2nd part of the duodenum.   While withdrawing the scope the above findings were verified and the scope was removed.  The patient has tolerated the procedure and conscious sedation without unusual events.      BMP  Recent Labs     04/19/19  9018 examination on the patient and discussed the management with the nurse practitioner. I reviewed and agree with the findings and plan as documented in her note. Acute cholecystitis. Mild pancreatitis. NPO. IVF. Surgery following for possible cholecystectomy.      Electronically signed by Holly Cook MD on 4/21/19 at 2:08 PM

## 2019-04-21 NOTE — PLAN OF CARE
Problem: Risk for Impaired Skin Integrity  Goal: Tissue integrity - skin and mucous membranes  Description  Structural intactness and normal physiological function of skin and  mucous membranes. 4/21/2019 0540 by Haleigh White RN  Outcome: Ongoing  Note:   Skin assessment complete. Alternating air pressure mattress in place. Coccyx reddened. Sensicare applied PRN. Turned and repositioned every two hours. Area kept free from moisture. Proper nourishment and fluids encouraged, as appropriate. Will continue to monitor for additional needs and changes in skin breakdown. Problem: Falls - Risk of:  Goal: Will remain free from falls  Description  Will remain free from falls  4/21/2019 0540 by Haleigh White RN  Outcome: Ongoing  Note:   Pt assessed as a fall risk this shift. Remains free from falls and accidental injury at this time. Fall precautions in place, including falling star sign and fall risk band on pt. Floor free from obstacles, and bed is locked and in lowest position. Adequate lighting provided. Pt encouraged to call before getting OOB for any need. Bed alarm activated. Will continue to monitor needs during hourly rounding, and reinforce education on use of call light. Problem: Infection, Septic Shock:  Goal: Will show no infection signs and symptoms  Description  Will show no infection signs and symptoms  4/21/2019 0540 by Haleigh White RN  Outcome: Ongoing  Note:   Patient remains afebrile; WBC count is 31.8; no signs of erythema, edema, or warmth. Will continue to monitor for signs/symptoms of infection.        Problem: Tissue Perfusion, Altered:  Goal: Circulatory function within specified parameters  Description  Circulatory function within specified parameters  4/21/2019 0540 by Haleigh White RN  Outcome: Ongoing  Note:   Vitals:    04/21/19 0445 04/21/19 0500 04/21/19 0515 04/21/19 0530   BP: (!) 146/87 104/63 (!) 81/51 (!) 152/92   Pulse: 89 80 78 90   Resp: 19 16 16 14   Temp: 98.5 °F

## 2019-04-21 NOTE — PROGRESS NOTES
sclerotic bone appearance     CVA (cerebral vascular accident) ([address]     left  side weakness    GERD (gastroesophageal reflux disease)     Hypertension     Paraproteinemia     Weight loss       Past Surgical History:   Procedure Laterality Date    INTUBATION  4/14/2019         PACEMAKER PLACEMENT  07/2011    Pacemaker is Medtronic Revo (compatible). Leads placed in 1995 are NOT MRI compatible. Placed at [address]. V's per Dr. Saba Mcelroy can not have an MRI.  UPPER GASTROINTESTINAL ENDOSCOPY N/A 4/16/2019    EGD ESOPHAGOGASTRODUODENOSCOPY @ BEDSIDE  ICU 2002 performed by Quentin Muller MD at [address]          Admission Meds  No current facility-administered medications on file prior to encounter. Current Outpatient Medications on File Prior to Encounter   Medication Sig Dispense Refill    oxyCODONE-acetaminophen (PERCOCET) 5-325 MG per tablet Take 1 tablet by mouth every 6 hours as needed for Pain.  senna-docusate (PERICOLACE) 8.6-50 MG per tablet Take 1 tablet by mouth 2 times daily      budesonide-formoterol (SYMBICORT) 160-4.5 MCG/ACT AERO Inhale 2 puffs into the lungs 2 times daily      sucralfate (CARAFATE) 1 GM/10ML suspension Take 1 g by mouth 4 times daily       traZODone (DESYREL) 50 MG tablet Take 50 mg by mouth nightly      tiZANidine (ZANAFLEX) 2 MG tablet Take 2 mg by mouth every 8 hours as needed (left knee)       aspirin 81 MG tablet Take 81 mg by mouth daily      clopidogrel (PLAVIX) 75 MG tablet TAKE 1 TABLET DAILY 30 tablet 2    DOCQLACE 100 MG capsule TAKE 1 CAPSULE BY MOUTH IN THE MORNING & IN THE EVENING -DRINK PLENTYOF WATER WHILE TAKING THIS MEDICINE 30 capsule 1    amLODIPine (NORVASC) 10 MG tablet Take 10 mg by mouth daily.  LORazepam (ATIVAN) 0.5 MG tablet Take 0.5 mg by mouth every 6 hours as needed for Anxiety.  therapeutic multivitamin-minerals (THERAGRAN-M) tablet Take 1 tablet by mouth daily.       omeprazole (PRILOSEC) 20 MG capsule Take 20 mg by --    BUN 15 16 15   CREATININE <0.40* <0.40* <0.40*     Recent Labs     04/18/19  1558 04/19/19  0341 04/20/19  0355 04/21/19  0418   AST 24 30 41* 41*   ALT 13 17 24 27   BILIDIR 0.20  --   --   --    BILITOT 0.46 0.51 0.62 0.47   ALKPHOS 80 106* 132* 111*     Recent Labs     04/18/19  1558 04/20/19  0355 04/21/19  0418   LIPASE 52 60 68*   AMYLASE 259* 267* 258*     Recent Labs     04/19/19  0341 04/20/19  0355 04/21/19  0418   PROTIME 14.4 13.9 14.5   INR 1.1 1.1 1.1       Imaging Studies:                           All appropriate imaging studies and reports reviewed: Yes       Ct Abdomen Pelvis Wo Contrast Additional Contrast? Oral    Result Date: 4/14/2019  EXAMINATION: CT OF THE ABDOMEN AND PELVIS WITHOUT CONTRAST 4/14/2019 7:34 pm TECHNIQUE: CT of the abdomen and pelvis was performed without the administration of intravenous contrast. Multiplanar reformatted images are provided for review. Dose modulation, iterative reconstruction, and/or weight based adjustment of the mA/kV was utilized to reduce the radiation dose to as low as reasonably achievable. COMPARISON: 04/11/2019 HISTORY: ORDERING SYSTEM PROVIDED HISTORY: ABDOMINAL PAIN TECHNOLOGIST PROVIDED HISTORY: Water soluble contrast only please Ordering Physician Provided Reason for Exam: Abdominal pain - Vented patient. Contrast given via nurse through NG tube. Acuity: Unknown Type of Exam: Unknown Relevant Medical/Surgical History: Hx - Sepsis due to urinary tract infection. FINDINGS: Lower Chest: New moderate layering bilateral pleural effusions with bilateral lower lobe atelectasis. Organs: Limited evaluation due lack of intravenous contrast.  Cholelithiasis redemonstrated. No gallbladder wall thickening or biliary ductal dilatation. Scattered tiny hypodense lesions in the liver are too small to characterize but statistically represent benign cysts or hemangiomas and appear unchanged. The pancreas, spleen, adrenal glands, and kidneys are unremarkable. There is no hydronephrosis or urinary tract calculus. GI/Bowel: The stomach is distended. Enteric tube is in place. No contrast is seen distal to the pylorus and there is contrast reflux into the distal esophagus. There is no evidence of bowel obstruction. The appendix is not definitely visualized. No focal pericecal inflammatory changes are evident. Pelvis: The urinary bladder is decompressed by Tran catheter. No pelvic mass is seen. Peritoneum/Retroperitoneum: Small amount of free fluid in the pelvic cavity. No free air or focal fluid collection. No abnormal lymph node. Normal abdominal aortic caliber. Moderate calcific atherosclerosis. Bones/Soft Tissues: No acute osseous abnormality. Diffuse anasarca. Moderate degenerative changes in the lumbar spine. 1. Distended, contrast filled stomach with reflux of contrast into the esophagus. No enteric contrast is seen distal to the pylorus suggesting delayed gastric emptying in the setting of ileus. 2. New moderate layering bilateral pleural effusions with bilateral lower lobe atelectasis. 3. Small amount of nonspecific free fluid in the pelvic cavity. 4. Anasarca. 5. Cholelithiasis. Xr Chest (single View Frontal)    Result Date: 4/13/2019  EXAMINATION: SINGLE XRAY VIEW OF THE CHEST 4/13/2019 7:18 am COMPARISON: April 12, 2019 HISTORY: ORDERING SYSTEM PROVIDED HISTORY: dyspnea TECHNOLOGIST PROVIDED HISTORY: dyspnea Ordering Physician Provided Reason for Exam: dyspnea Acuity: Acute Type of Exam: Subsequent/Follow-up Additional signs and symptoms: dyspnea FINDINGS: A right IJ catheter is seen with its tip terminating at the superior cavoatrial junction. The left chest wall pacemaker and leads are stable. The cardiomediastinal silhouette is stable. There is interval increased opacity at the right lung base, may be related to atelectasis versus pneumonia. There is small atelectasis and mild pleural effusion at the left lung base.   There is no FINDINGS: Left femur, four views: The bones are diffusely osteopenic. No acute fracture deformity. The hip joint is maintained. The femoral head projects within the acetabulum. No focal soft tissue abnormality is seen. Left knee, two views: The knee is flexed. No acute osseous abnormality. No joint effusion. Joint spaces are not optimally profiled but no significant arthritic changes are apparent. Left tib-fib, three views: There is evidence of a remote healed distal tibia fracture. No acute fracture or dislocation is seen. No focal soft tissue abnormality. No acute osseous abnormality of the left femur. No acute osseous abnormality of the left knee. No acute osseous abnormality of the left tib-fib. Healed remote distal tibia fracture. Marked osteopenia, likely due to disuse. Xr Knee Left (3 Views)    Result Date: 3/30/2019  EXAMINATION: 3 XRAY VIEWS OF THE LEFT KNEE 3/30/2019 10:58 am COMPARISON: March 27, 2018 HISTORY: ORDERING SYSTEM PROVIDED HISTORY: F/u L knee pain after cast TECHNOLOGIST PROVIDED HISTORY: AP, oblique, lateral F/u L knee pain after cast FINDINGS: Marked osteopenia. Interval casting, which degrades fine osseous detail question cortical offset in the lateral femoral metaphysis. No malalignment identified. No significant joint effusion. Interval casting. Question nondisplaced fracture in the lateral femoral metaphysis. Osteopenia. Xr Tibia Fibula Left (2 Views)    Result Date: 3/27/2019  EXAMINATION: 4 XRAY VIEWS OF THE LEFT FEMUR; 2 XRAY VIEWS OF THE LEFT KNEE; 3 XRAY VIEWS OF THE LEFT TIBIA AND FIBULA 3/27/2019 7:00 pm COMPARISON: Left hip 11/14/2015. HISTORY: ORDERING SYSTEM PROVIDED HISTORY: pain TECHNOLOGIST PROVIDED HISTORY: pain Ordering Physician Provided Reason for Exam: pt twisted wrong, lt knee pain, unable to straighten knee. Acuity: Acute Type of Exam: Initial FINDINGS: Left femur, four views: The bones are diffusely osteopenic.   No acute fracture deformity. The hip joint is maintained. The femoral head projects within the acetabulum. No focal soft tissue abnormality is seen. Left knee, two views: The knee is flexed. No acute osseous abnormality. No joint effusion. Joint spaces are not optimally profiled but no significant arthritic changes are apparent. Left tib-fib, three views: There is evidence of a remote healed distal tibia fracture. No acute fracture or dislocation is seen. No focal soft tissue abnormality. No acute osseous abnormality of the left femur. No acute osseous abnormality of the left knee. No acute osseous abnormality of the left tib-fib. Healed remote distal tibia fracture. Marked osteopenia, likely due to disuse. Xr Abdomen (kub) (single Ap View)    Result Date: 4/14/2019  EXAMINATION: SINGLE SUPINE XRAY VIEW(S) OF THE ABDOMEN 4/14/2019 7:39 am COMPARISON: CT abdomen and pelvis  film from 11 April 2019 HISTORY: [address] [facility] [address]: Abd Distention TECHNOLOGIST PROVIDED HISTORY: Abd Distention Ordering Physician Provided Reason for Exam: Abdominal distention. Pt was moving around in the bed. Best films at present time. Acuity: Acute Type of Exam: Initial Additional signs and symptoms: Abdominal distention. Pt was moving around in the bed. Best films at present time. FINDINGS: Portable view time stamped at 748 hours demonstrates an intestinal tube terminating in the midportion of a gaseous Spike dilated stomach. Densities are present over the stomach likely medication. Bipolar pacemaker is in situ with intact leads. Heart size is top-normal, stable. Gaseous distension of the stomach and loop of bowel in the upper mid abdomen is noted but there is gas and fecal material in the rectum. Gastric outlet obstruction or proximal small bowel partial obstruction is suspected. No free air is noted. Midline city is present over the pelvis likely a monitor or CT small bore catheter.  Vascular calcification is present in the pelvis. Persistent preferential gaseous distension of the stomach although there is some gas in the upper abdomen and gas and fecal material present in the rectosigmoid. Findings suggest gastric outlet obstruction. Ct Abdomen Pelvis W Iv Contrast    Result Date: 4/11/2019  EXAMINATION: CT OF THE ABDOMEN AND PELVIS WITH CONTRAST 4/11/2019 5:14 pm TECHNIQUE: CT of the abdomen and pelvis was performed with the administration of intravenous contrast. Multiplanar reformatted images are provided for review. Dose modulation, iterative reconstruction, and/or weight based adjustment of the mA/kV was utilized to reduce the radiation dose to as low as reasonably achievable. COMPARISON: None. HISTORY: ORDERING SYSTEM PROVIDED HISTORY: Abdominal pain TECHNOLOGIST PROVIDED HISTORY: IV Only Contrast Ordering Physician Provided Reason for Exam: patient c/o abd pain for an hour FINDINGS: Lower Chest: Trace pleural fluid bilaterally is noted. Indwelling cardiac pacemaker is present. Heart size is normal.  Coronary artery calcifications are evident. The esophagus is significantly dilated and fluid-filled. No paraesophageal adenopathy is evident. Organs: The spleen, pancreas, and adrenals are unremarkable. The liver contains hypodensities, likely cysts. The gallbladder is distended and contains a solitary gallstone. The kidneys excrete contrast bilaterally. Extrarenal collecting systems are noted. The ureters are mildly dilated down to the urinary bladder without evidence of intraluminal or ureteral obstructing calculi. GI/Bowel: Marked distention of the stomach is noted; this continues to the pylorus with appearance suggesting partial gastric outlet obstruction. Fluid is present in some small bowel loops and colon distal to the stomach. No small bowel obstruction. Pelvis: Marked distention of the urinary bladder is noted.   There is air in the urinary bladder wall, likely related to emphysematous with resolving pulmonary edema. Tubes and lines as above. Xr Chest Portable    Result Date: 4/14/2019  EXAMINATION: SINGLE XRAY VIEW OF THE CHEST 4/14/2019 7:57 am COMPARISON: Portable chest 04/13/2019. HISTORY: ORDERING SYSTEM PROVIDED HISTORY: Intubation TECHNOLOGIST PROVIDED HISTORY: Intubation Ordering Physician Provided Reason for Exam: intubation Acuity: Acute Type of Exam: Initial Additional signs and symptoms: intubation FINDINGS: Endotracheal tube terminates over the midthoracic trachea. Dual-chamber pacemaker leads appear unchanged in position. Right IJ approach central venous catheter unchanged in position. Heart size not substantially changed. Perihilar and basilar opacities further increased. Left pleural effusion increased in size. Findings may reflect pulmonary edema, progressed from yesterday's exam.  Left pleural effusion increased in size. Xr Chest Portable    Result Date: 4/12/2019  EXAMINATION: SINGLE XRAY VIEW OF THE CHEST 4/12/2019 5:26 am COMPARISON: November 14, 2015. HISTORY: ORDERING SYSTEM PROVIDED HISTORY: line placement TECHNOLOGIST PROVIDED HISTORY: line placement Ordering Physician Provided Reason for Exam: New right side line placement. Acuity: Acute Type of Exam: Initial Additional signs and symptoms: New right side line placement. FINDINGS: Stable left pectoral trans venous cardiac pacer device. New right IJ central venous catheter with tip near the superior atrial caval junction. Normal lung volume. No new consolidation. Curvilinear radiopacity projecting over the right upper lobe likely represents artifact from a skin fold. No pleural effusion or pneumothorax. Stable cardiomediastinal silhouette and great vessels with redemonstration of atherosclerotic thoracic aorta. New right IJ central venous catheter with tip near the superior atrial caval junction. No pneumothorax. No new consolidation.                    Thank you for allowing me to participate in the care of your patient. Please feel free to contact me with any questions or concerns.      Dayami Ardon MD

## 2019-04-21 NOTE — PROGRESS NOTES
Resident team notified regarding EKG order duplicate, pt's HR at 980 while sleeping on amiodarone gtt.     Electronically signed by Cynthia Dorado RN on 4/21/2019 at 10:28 AM

## 2019-04-22 ENCOUNTER — APPOINTMENT (OUTPATIENT)
Dept: GENERAL RADIOLOGY | Age: 69
DRG: 853 | End: 2019-04-22
Payer: COMMERCIAL

## 2019-04-22 ENCOUNTER — APPOINTMENT (OUTPATIENT)
Dept: INTERVENTIONAL RADIOLOGY/VASCULAR | Age: 69
DRG: 853 | End: 2019-04-22
Payer: COMMERCIAL

## 2019-04-22 LAB
ALBUMIN SERPL-MCNC: 1.8 G/DL (ref 3.5–5.2)
ALBUMIN/GLOBULIN RATIO: ABNORMAL (ref 1–2.5)
ALP BLD-CCNC: 104 U/L (ref 35–104)
ALT SERPL-CCNC: 24 U/L (ref 5–33)
AMYLASE: 246 U/L (ref 28–100)
ANION GAP SERPL CALCULATED.3IONS-SCNC: 10 MMOL/L (ref 9–17)
AST SERPL-CCNC: 35 U/L
BILIRUB SERPL-MCNC: 0.43 MG/DL (ref 0.3–1.2)
BUN BLDV-MCNC: 22 MG/DL (ref 8–23)
BUN/CREAT BLD: ABNORMAL (ref 9–20)
C-REACTIVE PROTEIN: 79.3 MG/L (ref 0–5)
CALCIUM SERPL-MCNC: 7.5 MG/DL (ref 8.6–10.4)
CHLORIDE BLD-SCNC: 97 MMOL/L (ref 98–107)
CO2: 29 MMOL/L (ref 20–31)
CREAT SERPL-MCNC: <0.4 MG/DL (ref 0.5–0.9)
CULTURE: NORMAL
DIRECT EXAM: NORMAL
GFR AFRICAN AMERICAN: ABNORMAL ML/MIN
GFR NON-AFRICAN AMERICAN: ABNORMAL ML/MIN
GFR SERPL CREATININE-BSD FRML MDRD: ABNORMAL ML/MIN/{1.73_M2}
GFR SERPL CREATININE-BSD FRML MDRD: ABNORMAL ML/MIN/{1.73_M2}
GLUCOSE BLD-MCNC: 120 MG/DL (ref 65–105)
GLUCOSE BLD-MCNC: 124 MG/DL (ref 65–105)
GLUCOSE BLD-MCNC: 125 MG/DL (ref 65–105)
GLUCOSE BLD-MCNC: 133 MG/DL (ref 65–105)
GLUCOSE BLD-MCNC: 143 MG/DL (ref 65–105)
GLUCOSE BLD-MCNC: 208 MG/DL (ref 70–99)
HCT VFR BLD CALC: 35.7 % (ref 36–46)
HEMOGLOBIN: 11.7 G/DL (ref 12–16)
INR BLD: 1.2
Lab: NORMAL
MAGNESIUM: 1.7 MG/DL (ref 1.6–2.6)
MCH RBC QN AUTO: 30.1 PG (ref 26–34)
MCHC RBC AUTO-ENTMCNC: 32.7 G/DL (ref 31–37)
MCV RBC AUTO: 91.9 FL (ref 80–100)
NRBC AUTOMATED: ABNORMAL PER 100 WBC
PDW BLD-RTO: 14.9 % (ref 11.5–14.9)
PHOSPHORUS: 4.3 MG/DL (ref 2.6–4.5)
PLATELET # BLD: 152 K/UL (ref 150–450)
PMV BLD AUTO: 8.8 FL (ref 6–12)
POTASSIUM SERPL-SCNC: 4.9 MMOL/L (ref 3.7–5.3)
PROCALCITONIN: 0.12 NG/ML
PROTHROMBIN TIME: 15.1 SEC (ref 11.8–14.6)
RBC # BLD: 3.88 M/UL (ref 4–5.2)
SEDIMENTATION RATE, ERYTHROCYTE: 18 MM (ref 0–20)
SODIUM BLD-SCNC: 136 MMOL/L (ref 135–144)
SPECIMEN DESCRIPTION: NORMAL
TOTAL PROTEIN: 4.4 G/DL (ref 6.4–8.3)
WBC # BLD: 21.2 K/UL (ref 3.5–11)

## 2019-04-22 PROCEDURE — 87077 CULTURE AEROBIC IDENTIFY: CPT

## 2019-04-22 PROCEDURE — 85651 RBC SED RATE NONAUTOMATED: CPT

## 2019-04-22 PROCEDURE — 2500000003 HC RX 250 WO HCPCS: Performed by: INTERNAL MEDICINE

## 2019-04-22 PROCEDURE — 94640 AIRWAY INHALATION TREATMENT: CPT

## 2019-04-22 PROCEDURE — 83735 ASSAY OF MAGNESIUM: CPT

## 2019-04-22 PROCEDURE — 2580000003 HC RX 258: Performed by: INTERNAL MEDICINE

## 2019-04-22 PROCEDURE — 87205 SMEAR GRAM STAIN: CPT

## 2019-04-22 PROCEDURE — 6370000000 HC RX 637 (ALT 250 FOR IP): Performed by: INTERNAL MEDICINE

## 2019-04-22 PROCEDURE — 6360000002 HC RX W HCPCS: Performed by: INTERNAL MEDICINE

## 2019-04-22 PROCEDURE — 6360000002 HC RX W HCPCS: Performed by: RADIOLOGY

## 2019-04-22 PROCEDURE — APPNB30 APP NON BILLABLE TIME 0-30 MINS: Performed by: NURSE PRACTITIONER

## 2019-04-22 PROCEDURE — 71045 X-RAY EXAM CHEST 1 VIEW: CPT

## 2019-04-22 PROCEDURE — 85610 PROTHROMBIN TIME: CPT

## 2019-04-22 PROCEDURE — 0F9430Z DRAINAGE OF GALLBLADDER WITH DRAINAGE DEVICE, PERCUTANEOUS APPROACH: ICD-10-PCS | Performed by: INTERNAL MEDICINE

## 2019-04-22 PROCEDURE — 2580000003 HC RX 258: Performed by: SURGERY

## 2019-04-22 PROCEDURE — 87070 CULTURE OTHR SPECIMN AEROBIC: CPT

## 2019-04-22 PROCEDURE — 87075 CULTR BACTERIA EXCEPT BLOOD: CPT

## 2019-04-22 PROCEDURE — 93970 EXTREMITY STUDY: CPT

## 2019-04-22 PROCEDURE — 2000000000 HC ICU R&B

## 2019-04-22 PROCEDURE — 2700000000 HC OXYGEN THERAPY PER DAY

## 2019-04-22 PROCEDURE — 82947 ASSAY GLUCOSE BLOOD QUANT: CPT

## 2019-04-22 PROCEDURE — 47490 INCISION OF GALLBLADDER: CPT | Performed by: RADIOLOGY

## 2019-04-22 PROCEDURE — C9113 INJ PANTOPRAZOLE SODIUM, VIA: HCPCS | Performed by: INTERNAL MEDICINE

## 2019-04-22 PROCEDURE — 2500000003 HC RX 250 WO HCPCS: Performed by: STUDENT IN AN ORGANIZED HEALTH CARE EDUCATION/TRAINING PROGRAM

## 2019-04-22 PROCEDURE — 84100 ASSAY OF PHOSPHORUS: CPT

## 2019-04-22 PROCEDURE — 36592 COLLECT BLOOD FROM PICC: CPT

## 2019-04-22 PROCEDURE — 6360000004 HC RX CONTRAST MEDICATION: Performed by: INTERNAL MEDICINE

## 2019-04-22 PROCEDURE — 84145 PROCALCITONIN (PCT): CPT

## 2019-04-22 PROCEDURE — 93005 ELECTROCARDIOGRAM TRACING: CPT

## 2019-04-22 PROCEDURE — 6370000000 HC RX 637 (ALT 250 FOR IP): Performed by: STUDENT IN AN ORGANIZED HEALTH CARE EDUCATION/TRAINING PROGRAM

## 2019-04-22 PROCEDURE — 82150 ASSAY OF AMYLASE: CPT

## 2019-04-22 PROCEDURE — 97110 THERAPEUTIC EXERCISES: CPT

## 2019-04-22 PROCEDURE — 94762 N-INVAS EAR/PLS OXIMTRY CONT: CPT

## 2019-04-22 PROCEDURE — 99233 SBSQ HOSP IP/OBS HIGH 50: CPT | Performed by: INTERNAL MEDICINE

## 2019-04-22 PROCEDURE — 99232 SBSQ HOSP IP/OBS MODERATE 35: CPT | Performed by: INTERNAL MEDICINE

## 2019-04-22 PROCEDURE — 87186 SC STD MICRODIL/AGAR DIL: CPT

## 2019-04-22 PROCEDURE — 2500000003 HC RX 250 WO HCPCS: Performed by: RADIOLOGY

## 2019-04-22 PROCEDURE — 85027 COMPLETE CBC AUTOMATED: CPT

## 2019-04-22 PROCEDURE — 86140 C-REACTIVE PROTEIN: CPT

## 2019-04-22 PROCEDURE — 80053 COMPREHEN METABOLIC PANEL: CPT

## 2019-04-22 PROCEDURE — 2709999900 IR CHOLECYSTOSTOMY PERCUTANEOUS COMPLETE

## 2019-04-22 RX ORDER — FENTANYL CITRATE 50 UG/ML
INJECTION, SOLUTION INTRAMUSCULAR; INTRAVENOUS
Status: COMPLETED | OUTPATIENT
Start: 2019-04-22 | End: 2019-04-22

## 2019-04-22 RX ORDER — METHYLPREDNISOLONE SODIUM SUCCINATE 40 MG/ML
20 INJECTION, POWDER, LYOPHILIZED, FOR SOLUTION INTRAMUSCULAR; INTRAVENOUS EVERY 12 HOURS
Status: DISCONTINUED | OUTPATIENT
Start: 2019-04-22 | End: 2019-04-24

## 2019-04-22 RX ORDER — LIDOCAINE HYDROCHLORIDE 10 MG/ML
INJECTION, SOLUTION INFILTRATION; PERINEURAL
Status: COMPLETED | OUTPATIENT
Start: 2019-04-22 | End: 2019-04-22

## 2019-04-22 RX ADMIN — METHYLPREDNISOLONE SODIUM SUCCINATE 30 MG: 40 INJECTION, POWDER, FOR SOLUTION INTRAMUSCULAR; INTRAVENOUS at 04:39

## 2019-04-22 RX ADMIN — IPRATROPIUM BROMIDE 0.5 MG: 0.5 SOLUTION RESPIRATORY (INHALATION) at 07:06

## 2019-04-22 RX ADMIN — NOREPINEPHRINE BITARTRATE 1 MCG/MIN: 1 INJECTION INTRAVENOUS at 00:16

## 2019-04-22 RX ADMIN — FUROSEMIDE 40 MG: 10 INJECTION, SOLUTION INTRAMUSCULAR; INTRAVENOUS at 10:09

## 2019-04-22 RX ADMIN — Medication 10 ML: at 21:55

## 2019-04-22 RX ADMIN — FENTANYL CITRATE 25 MCG: 50 INJECTION, SOLUTION INTRAMUSCULAR; INTRAVENOUS at 12:56

## 2019-04-22 RX ADMIN — LEVALBUTEROL 1.25 MG: 1.25 SOLUTION, CONCENTRATE RESPIRATORY (INHALATION) at 03:56

## 2019-04-22 RX ADMIN — DOCUSATE SODIUM 100 MG: 100 CAPSULE, LIQUID FILLED ORAL at 21:55

## 2019-04-22 RX ADMIN — METRONIDAZOLE 500 MG: 500 INJECTION, SOLUTION INTRAVENOUS at 11:12

## 2019-04-22 RX ADMIN — HYDROXYZINE HYDROCHLORIDE 50 MG: 25 TABLET, FILM COATED ORAL at 18:24

## 2019-04-22 RX ADMIN — LEVALBUTEROL 1.25 MG: 1.25 SOLUTION, CONCENTRATE RESPIRATORY (INHALATION) at 23:55

## 2019-04-22 RX ADMIN — METRONIDAZOLE 500 MG: 500 INJECTION, SOLUTION INTRAVENOUS at 21:53

## 2019-04-22 RX ADMIN — Medication 10 ML: at 10:09

## 2019-04-22 RX ADMIN — METHYLPREDNISOLONE SODIUM SUCCINATE 30 MG: 40 INJECTION, POWDER, FOR SOLUTION INTRAMUSCULAR; INTRAVENOUS at 11:57

## 2019-04-22 RX ADMIN — IPRATROPIUM BROMIDE 0.5 MG: 0.5 SOLUTION RESPIRATORY (INHALATION) at 03:56

## 2019-04-22 RX ADMIN — VENLAFAXINE 37.5 MG: 37.5 TABLET ORAL at 21:55

## 2019-04-22 RX ADMIN — METRONIDAZOLE 500 MG: 500 INJECTION, SOLUTION INTRAVENOUS at 03:19

## 2019-04-22 RX ADMIN — IPRATROPIUM BROMIDE 0.5 MG: 0.5 SOLUTION RESPIRATORY (INHALATION) at 23:55

## 2019-04-22 RX ADMIN — AMIODARONE HYDROCHLORIDE 0.5 MG/MIN: 1.8 INJECTION, SOLUTION INTRAVENOUS at 07:21

## 2019-04-22 RX ADMIN — LIDOCAINE HYDROCHLORIDE 5 ML: 10 INJECTION, SOLUTION INFILTRATION; PERINEURAL at 12:47

## 2019-04-22 RX ADMIN — I.V. FAT EMULSION 100 ML: 20 EMULSION INTRAVENOUS at 17:48

## 2019-04-22 RX ADMIN — Medication 10 ML: at 04:40

## 2019-04-22 RX ADMIN — LEVALBUTEROL 1.25 MG: 1.25 SOLUTION, CONCENTRATE RESPIRATORY (INHALATION) at 07:06

## 2019-04-22 RX ADMIN — IOPAMIDOL 30 ML: 612 INJECTION, SOLUTION INTRAVENOUS at 13:39

## 2019-04-22 RX ADMIN — FENTANYL CITRATE 50 MCG: 50 INJECTION, SOLUTION INTRAMUSCULAR; INTRAVENOUS at 12:47

## 2019-04-22 RX ADMIN — AMIODARONE HYDROCHLORIDE 0.5 MG/MIN: 1.8 INJECTION, SOLUTION INTRAVENOUS at 18:46

## 2019-04-22 RX ADMIN — INSULIN LISPRO 2 UNITS: 100 INJECTION, SOLUTION INTRAVENOUS; SUBCUTANEOUS at 15:32

## 2019-04-22 RX ADMIN — LEVOFLOXACIN 500 MG: 5 INJECTION, SOLUTION INTRAVENOUS at 13:57

## 2019-04-22 RX ADMIN — PANTOPRAZOLE SODIUM 40 MG: 40 INJECTION, POWDER, FOR SOLUTION INTRAVENOUS at 10:09

## 2019-04-22 RX ADMIN — LEVALBUTEROL 1.25 MG: 1.25 SOLUTION, CONCENTRATE RESPIRATORY (INHALATION) at 19:33

## 2019-04-22 RX ADMIN — CALCIUM GLUCONATE: 94 INJECTION, SOLUTION INTRAVENOUS at 17:47

## 2019-04-22 RX ADMIN — IPRATROPIUM BROMIDE 0.5 MG: 0.5 SOLUTION RESPIRATORY (INHALATION) at 19:32

## 2019-04-22 ASSESSMENT — ENCOUNTER SYMPTOMS
SORE THROAT: 0
STRIDOR: 0
ABDOMINAL PAIN: 1
NAUSEA: 0
COUGH: 0
EYE DISCHARGE: 0
SHORTNESS OF BREATH: 0
WHEEZING: 0
CHEST TIGHTNESS: 0
VOMITING: 0
ABDOMINAL DISTENTION: 0
APNEA: 0
CONSTIPATION: 0
EYE REDNESS: 0
DIARRHEA: 0

## 2019-04-22 ASSESSMENT — PAIN DESCRIPTION - ORIENTATION: ORIENTATION: LEFT;RIGHT

## 2019-04-22 ASSESSMENT — PAIN DESCRIPTION - PAIN TYPE: TYPE: ACUTE PAIN

## 2019-04-22 ASSESSMENT — PAIN SCALES - WONG BAKER: WONGBAKER_NUMERICALRESPONSE: 4

## 2019-04-22 ASSESSMENT — PAIN DESCRIPTION - LOCATION: LOCATION: LEG;SHOULDER

## 2019-04-22 NOTE — CARE COORDINATION
ONGOING DISCHARGE PLAN:    Plan remains for LSW to continue to follow for return to SNF, [facility]. PT Rec SNF. Plan for Today, is for Cholecystostomy tube placed today in IR. Surgery continues to follow. Remains on IV Flagyl & levaquin & TPN. Will continue to follow along w/ LSW  for additional discharge needs.     Electronically signed by Matthew Hanson RN on 4/22/2019 at 12:03 PM

## 2019-04-22 NOTE — FLOWSHEET NOTE
04/22/19 1124   Encounter Summary   Services provided to: Patient   Referral/Consult From: Ольга   Continue Visiting   (4-21-19)   Volunteer Visit Yes   Complexity of Encounter Low   Length of Encounter 15 minutes   Routine   Type Follow up   Spiritual/Presybeterian   Type Spiritual support   Intervention [address] Patient received communion

## 2019-04-22 NOTE — FLOWSHEET NOTE
04/21/19 2145   Provider Notification   Reason for Communication Review case   Provider Name Dr. Emelina Clemens   Provider Notification Physician   Method of Communication Call   Response See orders   Notification Time 2150     RN notified Dr. Emelina Clemens of pt's drop in SBP to 76s. RN to d/c propofol order and restart pt on low dose Levophed. See MAR/orders. Dr. Damon Summers stated that he did not order Amiodarone gtt so RN to contact ordering physician as to whether to stop or continue.

## 2019-04-22 NOTE — PROGRESS NOTES
Intravenous Daily    sodium chloride  250 mL Intravenous Once    ipratropium  0.5 mg Nebulization Q4H    insulin lispro  0-12 Units Subcutaneous Q6H    levalbuterol  1.25 mg Nebulization Q4H    docusate sodium  100 mg Oral TID    levofloxacin  500 mg Intravenous Q24H    venlafaxine  37.5 mg Oral TID    clopidogrel  75 mg Oral Daily    aspirin  81 mg Oral Daily    sodium chloride flush  10 mL Intravenous 2 times per day    enoxaparin  40 mg Subcutaneous Daily     hydrOXYzine, sodium chloride flush, metoprolol, sodium chloride nebulizer, fentanNYL, oxyCODONE-acetaminophen, magnesium sulfate, sodium phosphate IVPB **OR** sodium phosphate IVPB, potassium chloride **OR** potassium alternative oral replacement **OR** potassium chloride, glucose, dextrose, glucagon (rDNA), dextrose, milk and molasses, sodium chloride flush, acetaminophen  IV Drips/Infusions   PN-Adult 2-in-1 Central Line (Standard) 65 mL/hr at 04/21/19 1812    fat emulsion Stopped (04/22/19 0629)    Amiodarone 0.5 mg/min (04/22/19 0721)    norepinephrine 3 mcg/min (04/22/19 0418)    dextrose         Diet/Nutrition   PN-Adult 2-in-1 Central Line (Standard)    Exam      Constitutional - Alert, oriented to person and place  General Appearance  well developed, well nourished  HEENT -normocephalic, atraumatic. PERRLA  Lungs - Chest expands equally, no wheezes, rales or rhonchi. Coarse sounds  Cardiovascular - Heart sounds are normal.  Mildly tachycardic. No murmur, gallop or rub. Abdomen - soft, nontender, nondistended.   Neurologic - chronic left-sided hemiparesis  Skin - no bruising or bleeding  Extremities - LUE substantially edematous, mild pedal edema    Lab Results   CBC     Lab Results   Component Value Date    WBC 21.2 04/22/2019    RBC 3.88 04/22/2019    RBC 3.66 05/23/2012    HGB 11.7 04/22/2019    HCT 35.7 04/22/2019     04/22/2019     05/23/2012    MCV 91.9 04/22/2019    MCH 30.1 04/22/2019    MCHC 32.7 04/22/2019 RDW 14.9 04/22/2019    METASPCT 2 04/11/2019    LYMPHOPCT 4 04/11/2019    MONOPCT 2 04/11/2019    BASOPCT 0 04/11/2019    MONOSABS 0.96 04/11/2019    LYMPHSABS 1.92 04/11/2019    EOSABS 0.00 04/11/2019    BASOSABS 0.00 04/11/2019    DIFFTYPE NOT REPORTED 04/11/2019       BMP   Lab Results   Component Value Date     04/22/2019    K 4.9 04/22/2019    CL 97 04/22/2019    CO2 29 04/22/2019    BUN 22 04/22/2019    CREATININE <0.40 04/22/2019    GLUCOSE 208 04/22/2019    GLUCOSE 87 05/21/2012       LFTS  Lab Results   Component Value Date    ALKPHOS 104 04/22/2019    ALT 24 04/22/2019    AST 35 04/22/2019    PROT 4.4 04/22/2019    BILITOT 0.43 04/22/2019    BILIDIR 0.20 04/18/2019    IBILI 0.26 04/18/2019    LABALBU 1.8 04/22/2019    LABALBU 4.6 05/17/2012       ABG ABGs:   Lab Results   Component Value Date    PHART 7.519 04/19/2019    PO2ART 73.3 04/19/2019    JZJ3OYW 42.5 04/19/2019       Lab Results   Component Value Date    MODE PRVC 04/19/2019         INR  Recent Labs     04/20/19  0355 04/21/19  0418 04/22/19  0502   PROTIME 13.9 14.5 15.1*   INR 1.1 1.1 1.2       Lactic Acid  Lab Results   Component Value Date    LACTA 2.1 04/14/2019    LACTA 1.5 04/12/2019    LACTA 1.9 04/12/2019          Cultures   Blood cultures show no growth for 4 days. Radiology     CXR  1. No significant change in findings suggestive of congestive heart failure. 2. Unchanged bilateral pleural effusions and bibasilar pulmonary  consolidations. SYSTEMS ASSESSMENT    Neuro   Chronic L hemiparesis secondary to CVA. Taken off propofol overnight due to drop in SBP. Respiratory   Wean oxygen as tolerated. Keep O2 sat > 88%. Continue xopenex and atrovent, solumedrol 30 q8. Cardiovascular   Continue amiodarone. Levo drip w/ SBP goal >90. Gastrointestinal   Cholecystitis and pancreatitis. Scheduled for IR placement of cholecystostomy tube today. Renal   BUN 22/Creatinine 0.40    Infectious Disease   Urosepsis. WBC 21.2, procalcitonin 0.12, CRP 79.3. Blood cultures no growth 4 days. Continue levaquin and flagyl. Hematology/Oncology   Hemoglobin 11.7, Hematocrit 35.7    Acute respiratory failure with hypoxia, extubated 4/19, no home O2  Hypervolemia  Bilateral pleural effusions left more than right  Hypotension? ?  Septic shock  COPD/history of smoking  Cholecystitis  E. coli UTI  History of CVA and left hemiparesis    Decrease O2 as tolerated, home O2 eval upon discharge  Diuresis if blood pressure permits  Follow-up chest x-ray  Bronchodilators, decrease steroids  For cholecystostomy tube placement today  Antibiotic  Lovenox follow-up lab      Electronically signed by Kennedi Crow on 4/22/2019 at 9:58 AM    Patient seen and examined independently by me. Above discussed and I agree with medical student note except where indicated in the EMR revision history. Also see my additional comments and changes indicated by discrete font, text color, italics, and/or initials. Labs, cultures, and radiographs where available were reviewed.      Electronically signed by Marcela Jarrell MD on 4/22/2019 at 12:28 PM

## 2019-04-22 NOTE — PROGRESS NOTES
Infectious disease Consult Note      Patient: Ayse Slaughter  : 1950  Acct#:  [id number]     Date:  2019    Assessment:     Chololithiases ,possible Cholecystitis followed by surgery . Sepsis due to [facility] Emphysematous cystitis     Aspiration pneumonia of right lower lobe. Septic shock     Yeast growth on sputum culture suspect contamination     Leukocytosis      AMYLASE elevated 259 possible pancreatitis . Acute hypoxic resp failure     MRSA carrier    Urinary retention ,Tran cath in place     Anemia      PCN allergy                 Recommendations:     Continue  IV Levaquin and Flagyl. Cholecystectomy tube today ,case was D/W surgery  . Follow CBC , renal function closely . Continue supportive care . Subjective:       History of Present Illness  Patient is a 77 y.o.  female admitted with Sepsis due to urinary tract infection   who is seen in consult for the same . She presented w dysuria and lower abd pain for around a week associated with vomiting ,was found hypotensive with leukocytosis . CT suggested emphysematous cystitis,possible gastric outlet obstruction. CXR showed a right lower infiltrate. High CA-19-9,CEA  Allergy to [facility] w SOB   Urine culture  grew ESCHERICHIA COLI resistant to Ampicillin ,sensitive to all others tested ABXS . Blood cultures negative . Mycoplasma IGM 0.97   YEAST MODERATE GROWTH on sputum culture   S/P EGD   with reported esophagitis. Amylase elevated. GB US Findings suggesting acute cholecystitis. HIIDA showed absent gallbladder filling. Interval history :  She is complaining of upperabdominal  pain ,no Vomiting or diarrhea . No reported fever .       Past Medical History:   Diagnosis Date    Abnormal computed tomography of cervical spine     sclerotic bone appearance     CVA (cerebral vascular accident) ([address]     left  side weakness    GERD (gastroesophageal reflux disease)     Hypertension     Paraproteinemia     04/22/19  0502   LIPASE 60 68*  --    AMYLASE 267* 258* 246*     Recent Labs     04/20/19  0355 04/21/19  0418 04/22/19  0502   PROTIME 13.9 14.5 15.1*   INR 1.1 1.1 1.2       Imaging Studies:                           All appropriate imaging studies and reports reviewed: Yes       Ct Abdomen Pelvis Wo Contrast Additional Contrast? Oral    Result Date: 4/14/2019  EXAMINATION: CT OF THE ABDOMEN AND PELVIS WITHOUT CONTRAST 4/14/2019 7:34 pm TECHNIQUE: CT of the abdomen and pelvis was performed without the administration of intravenous contrast. Multiplanar reformatted images are provided for review. Dose modulation, iterative reconstruction, and/or weight based adjustment of the mA/kV was utilized to reduce the radiation dose to as low as reasonably achievable. COMPARISON: 04/11/2019 HISTORY: ORDERING SYSTEM PROVIDED HISTORY: ABDOMINAL PAIN TECHNOLOGIST PROVIDED HISTORY: Water soluble contrast only please Ordering Physician Provided Reason for Exam: Abdominal pain - Vented patient. Contrast given via nurse through NG tube. Acuity: Unknown Type of Exam: Unknown Relevant Medical/Surgical History: Hx - Sepsis due to urinary tract infection. FINDINGS: Lower Chest: New moderate layering bilateral pleural effusions with bilateral lower lobe atelectasis. Organs: Limited evaluation due lack of intravenous contrast.  Cholelithiasis redemonstrated. No gallbladder wall thickening or biliary ductal dilatation. Scattered tiny hypodense lesions in the liver are too small to characterize but statistically represent benign cysts or hemangiomas and appear unchanged. The pancreas, spleen, adrenal glands, and kidneys are unremarkable. There is no hydronephrosis or urinary tract calculus. GI/Bowel: The stomach is distended. Enteric tube is in place. No contrast is seen distal to the pylorus and there is contrast reflux into the distal esophagus. There is no evidence of bowel obstruction. The appendix is not definitely visualized. No focal pericecal inflammatory changes are evident. Pelvis: The urinary bladder is decompressed by Tran catheter. No pelvic mass is seen. Peritoneum/Retroperitoneum: Small amount of free fluid in the pelvic cavity. No free air or focal fluid collection. No abnormal lymph node. Normal abdominal aortic caliber. Moderate calcific atherosclerosis. Bones/Soft Tissues: No acute osseous abnormality. Diffuse anasarca. Moderate degenerative changes in the lumbar spine. 1. Distended, contrast filled stomach with reflux of contrast into the esophagus. No enteric contrast is seen distal to the pylorus suggesting delayed gastric emptying in the setting of ileus. 2. New moderate layering bilateral pleural effusions with bilateral lower lobe atelectasis. 3. Small amount of nonspecific free fluid in the pelvic cavity. 4. Anasarca. 5. Cholelithiasis. Xr Chest (single View Frontal)    Result Date: 4/13/2019  EXAMINATION: SINGLE XRAY VIEW OF THE CHEST 4/13/2019 7:18 am COMPARISON: April 12, 2019 HISTORY: ORDERING SYSTEM PROVIDED HISTORY: dyspnea TECHNOLOGIST PROVIDED HISTORY: dyspnea Ordering Physician Provided Reason for Exam: dyspnea Acuity: Acute Type of Exam: Subsequent/Follow-up Additional signs and symptoms: dyspnea FINDINGS: A right IJ catheter is seen with its tip terminating at the superior cavoatrial junction. The left chest wall pacemaker and leads are stable. The cardiomediastinal silhouette is stable. There is interval increased opacity at the right lung base, may be related to atelectasis versus pneumonia. There is small atelectasis and mild pleural effusion at the left lung base. There is no pneumothorax. There is no acute osseous abnormality. Interval increased opacity at the right lung base, may be related to atelectasis versus pneumonia. Small atelectasis and mild pleural effusion at the left lung, new since the prior study.      Xr Femur Left (min 2 Views)    Result Date: No joint effusion. Joint spaces are not optimally profiled but no significant arthritic changes are apparent. Left tib-fib, three views: There is evidence of a remote healed distal tibia fracture. No acute fracture or dislocation is seen. No focal soft tissue abnormality. No acute osseous abnormality of the left femur. No acute osseous abnormality of the left knee. No acute osseous abnormality of the left tib-fib. Healed remote distal tibia fracture. Marked osteopenia, likely due to disuse. Xr Knee Left (3 Views)    Result Date: 3/30/2019  EXAMINATION: 3 XRAY VIEWS OF THE LEFT KNEE 3/30/2019 10:58 am COMPARISON: March 27, 2018 HISTORY: ORDERING SYSTEM PROVIDED HISTORY: F/u L knee pain after cast TECHNOLOGIST PROVIDED HISTORY: AP, oblique, lateral F/u L knee pain after cast FINDINGS: Marked osteopenia. Interval casting, which degrades fine osseous detail question cortical offset in the lateral femoral metaphysis. No malalignment identified. No significant joint effusion. Interval casting. Question nondisplaced fracture in the lateral femoral metaphysis. Osteopenia. Xr Tibia Fibula Left (2 Views)    Result Date: 3/27/2019  EXAMINATION: 4 XRAY VIEWS OF THE LEFT FEMUR; 2 XRAY VIEWS OF THE LEFT KNEE; 3 XRAY VIEWS OF THE LEFT TIBIA AND FIBULA 3/27/2019 7:00 pm COMPARISON: Left hip 11/14/2015. HISTORY: ORDERING SYSTEM PROVIDED HISTORY: pain TECHNOLOGIST PROVIDED HISTORY: pain Ordering Physician Provided Reason for Exam: pt twisted wrong, lt knee pain, unable to straighten knee. Acuity: Acute Type of Exam: Initial FINDINGS: Left femur, four views: The bones are diffusely osteopenic. No acute fracture deformity. The hip joint is maintained. The femoral head projects within the acetabulum. No focal soft tissue abnormality is seen. Left knee, two views: The knee is flexed. No acute osseous abnormality. No joint effusion.   Joint spaces are not optimally profiled but no significant arthritic changes are apparent. Left tib-fib, three views: There is evidence of a remote healed distal tibia fracture. No acute fracture or dislocation is seen. No focal soft tissue abnormality. No acute osseous abnormality of the left femur. No acute osseous abnormality of the left knee. No acute osseous abnormality of the left tib-fib. Healed remote distal tibia fracture. Marked osteopenia, likely due to disuse. Xr Abdomen (kub) (single Ap View)    Result Date: 4/14/2019  EXAMINATION: SINGLE SUPINE XRAY VIEW(S) OF THE ABDOMEN 4/14/2019 7:39 am COMPARISON: CT abdomen and pelvis  film from 11 April 2019 HISTORY: [address]: Abd Distention TECHNOLOGIST PROVIDED HISTORY: Abd Distention Ordering Physician Provided Reason for Exam: Abdominal distention. Pt was moving around in the bed. Best films at present time. Acuity: Acute Type of Exam: Initial Additional signs and symptoms: Abdominal distention. Pt was moving around in the bed. Best films at present time. FINDINGS: Portable view time stamped at 748 hours demonstrates an intestinal tube terminating in the midportion of a gaseous Spike dilated stomach. Densities are present over the stomach likely medication. Bipolar pacemaker is in situ with intact leads. Heart size is top-normal, stable. Gaseous distension of the stomach and loop of bowel in the upper mid abdomen is noted but there is gas and fecal material in the rectum. Gastric outlet obstruction or proximal small bowel partial obstruction is suspected. No free air is noted. Midline city is present over the pelvis likely a monitor or CT small bore catheter. Vascular calcification is present in the pelvis. Persistent preferential gaseous distension of the stomach although there is some gas in the upper abdomen and gas and fecal material present in the rectosigmoid. Findings suggest gastric outlet obstruction.      Ct Abdomen Pelvis W Iv Contrast    Result Date: nodes are present around the aorta in the upper abdomen. No mesenteric adenopathy is noted. Bones/Soft Tissues: Degenerative changes are present in the hips and lower lumbar facets. Estimated biologic radiation dose for this procedure:258.77 mGy/cm2.     1. Dilatation of the thoracic esophagus filled with fluid. No obstructive process is noted. No adjacent enlarged lymph nodes. 2. Trace pleural fluid. 3. Marked distention of the stomach. This appears to extend to the pylorus. Partial gastric outlet obstruction is not excluded. 4. Bilateral dilated ureters without obstructing calculi. Urinary bladder is markedly distended with bladder wall air suggesting emphysematous cystitis. Dilatation of the ureters may be related to reflux or infection. 5. Atherosclerotic disease. 6. Other findings as above. Critical results were called by Dr. Sanchez Mcgrath MD to [address] on 4/11/2019 at 17:37. Xr Chest Portable    Result Date: 4/16/2019  EXAMINATION: SINGLE XRAY VIEW OF THE CHEST 4/16/2019 6:54 am COMPARISON: 04/15/2019, 610 hours HISTORY: ORDERING SYSTEM PROVIDED HISTORY: ETT placement TECHNOLOGIST PROVIDED HISTORY: ETT placement Ordering Physician Provided Reason for Exam: on vent Acuity: Acute Type of Exam: Initial 64-year-old female on ventilator; check endotracheal tube placement FINDINGS: Portable AP upright view of the chest. Endotracheal tube distal tip overlying the mid trachea approximately 4.1 cm above the level of the ron. Enteric tube traverses the GE junction with distal tip excluded from the field of view. Left subclavian approach cardiac pacemaker device distal lead tips relatively stable in position. Right internal jugular approach central venous catheter distal tip overlying the high right atrium, stable. Cardiac monitor leads overlie the chest. Atherosclerotic calcification of the thoracic aorta. Slight stable volume loss of the left hemithorax. No pneumothorax. No free air.   Dense retrocardiac/left basilar airspace consolidation and small left-sided pleural effusion. Stable mild focal opacity at the right mid lung zone. Underlying COPD. Stable mild pulmonary vascular congestion and left-sided predominant parahilar opacity. Visualized osseous structures remain unchanged. 1. Stable multifocal airspace disease as detailed above with dense retrocardiac/left basilar airspace consolidation and small left-sided pleural effusion. Mild pulmonary vascular congestion. Findings may represent edema or multifocal pneumonia. 2. Underlying COPD. 3. Tubes and line as detailed above. Xr Chest Portable    Result Date: 4/15/2019  EXAMINATION: SINGLE XRAY VIEW OF THE CHEST 4/15/2019 6:47 am COMPARISON: 14 April 2019 HISTORY: ORDERING SYSTEM PROVIDED HISTORY: ETT placement TECHNOLOGIST PROVIDED HISTORY: ETT placement Ordering Physician Provided Reason for Exam: on vent Acuity: Acute Type of Exam: Initial FINDINGS: AP portable view of the chest time stamped at 612 hours demonstrates overlying cardiac monitoring electrodes. Endotracheal tube terminates 4 cm above the ron. Bipolar pacemaker enters from the left with intact leads in appropriate positions. Intestinal tube extends beyond the fundus of the stomach, tip not included. Right internal jugular catheter terminates at the cavoatrial junction. Heart size is normal.  Aortic arch is calcified. There is interval improvement in vascular congestion with resolution of perihilar opacities. Some bibasilar opacities remain. No extrapleural air is noted. Osseous structures are stable. Interval improvement in vascular congestion and bilateral opacities consistent with resolving pulmonary edema. Tubes and lines as above. Xr Chest Portable    Result Date: 4/14/2019  EXAMINATION: SINGLE XRAY VIEW OF THE CHEST 4/14/2019 7:57 am COMPARISON: Portable chest 04/13/2019.  HISTORY: ORDERING SYSTEM PROVIDED HISTORY: Intubation TECHNOLOGIST PROVIDED HISTORY: Intubation Ordering Physician Provided Reason for Exam: intubation Acuity: Acute Type of Exam: Initial Additional signs and symptoms: intubation FINDINGS: Endotracheal tube terminates over the midthoracic trachea. Dual-chamber pacemaker leads appear unchanged in position. Right IJ approach central venous catheter unchanged in position. Heart size not substantially changed. Perihilar and basilar opacities further increased. Left pleural effusion increased in size. Findings may reflect pulmonary edema, progressed from yesterday's exam.  Left pleural effusion increased in size. Xr Chest Portable    Result Date: 4/12/2019  EXAMINATION: SINGLE XRAY VIEW OF THE CHEST 4/12/2019 5:26 am COMPARISON: November 14, 2015. HISTORY: ORDERING SYSTEM PROVIDED HISTORY: line placement TECHNOLOGIST PROVIDED HISTORY: line placement Ordering Physician Provided Reason for Exam: New right side line placement. Acuity: Acute Type of Exam: Initial Additional signs and symptoms: New right side line placement. FINDINGS: Stable left pectoral trans venous cardiac pacer device. New right IJ central venous catheter with tip near the superior atrial caval junction. Normal lung volume. No new consolidation. Curvilinear radiopacity projecting over the right upper lobe likely represents artifact from a skin fold. No pleural effusion or pneumothorax. Stable cardiomediastinal silhouette and great vessels with redemonstration of atherosclerotic thoracic aorta. New right IJ central venous catheter with tip near the superior atrial caval junction. No pneumothorax. No new consolidation. Thank you for allowing me to participate in the care of your patient. Please feel free to contact me with any questions or concerns.      Stu Barton MD

## 2019-04-22 NOTE — PLAN OF CARE
Case discussed with Dr Alissa Sewell, will discontinue the NG, ok for a clear diet from a GI standpoint when ok with other services. Oh Albert, APRN - CNP

## 2019-04-22 NOTE — PROGRESS NOTES
(pain))  Other exercises 2: B Achilles stretch, 2x30 sec. each(Limited ROM on L LE due to pain/cast. )     PT Equipment Recommendations  Equipment Needed: No     Assessment  Activity Tolerance: Patient limited by pain; Patient Tolerated treatment well   Body structures, Functions, Activity limitations: Decreased functional mobility ; Decreased ADL status; Decreased strength; Increased Pain  Assessment: pain limiting ROM exercises  Specific instructions for Next Treatment: progress to mobility as able  Discharge Recommendations: ECF with PT     Type of devices: Call light within reach; Left in bed     Plan  Times per week: 5-7x/wk  Times per day: Daily  Current Treatment Recommendations: ROM, Strengthening, Functional Mobility Training, Transfer Training    Patient Education  New Education Provided:  Active ROM for edema control  Learner:patient  Method: explanation       Outcome: needs reinforcement     Goals  Short term goals  Time Frame for Short term goals: 10 visits  Short term goal 1: improve strength RUE/RLE to 4/5 to assist in bed mobility and transfers  Short term goal 2: improve bed mobility to minx1  Short term goal 3: improve transfers to minx1  Short term goal 4: progress to Gait and/or use of wheelchair for mobility    PT Individual Minutes  Time In: 7150  Time Out: 0845  Minutes: 28    Electronically signed by Debra Sterling PTA on 4/22/19 at 9:03 AM

## 2019-04-22 NOTE — PROGRESS NOTES
Nutrition Assessment (Parenteral Nutrition)    Type and Reason for Visit: Reassess    Nutrition Recommendations: Following changes made in additives: KCl- 70 to 55 mEq, K Phos- 45 to 37 mmol, Mg- 13 to 15 mEq, add MVI & trace elements. Nutrition Assessment: Patient is stable from a nutritional viewpoint with TPN and lipids meeting nutritional needs. GI is ok with discontinuing NG and starting clear liquid diet if ok with other services. Pt going to IR for placement of cholecystostomy tube. Malnutrition Assessment:  · Malnutrition Status: At risk for malnutrition  · Context: Acute illness or injury  · Findings of the 6 clinical characteristics of malnutrition (Minimum of 2 out of 6 clinical characteristics is required to make the diagnosis of moderate or severe Protein Calorie Malnutrition based on AND/ASPEN Guidelines):  1. Energy Intake-Less than or equal to 75% of estimated energy requirement(Previously; improving with parenteral nutrition), Greater than or equal to 5 days    2. Weight Loss-Unable to assess(edema present),    3. Fat Loss-Unable to assess,    4. Muscle Loss-Unable to assess,    5. Fluid Accumulation-(moderate fluid accumulation), Extremities  6.  Strength-Not measured    Nutrition Risk Level: High    Nutrient Needs:  · Estimated Daily Total Kcal: 7251-3223 based on wt of 25-27 per kg using wt of 57.2 kg  · Estimated Daily Protein (g): 63-68 based on 1.4-1.5 gm/kg IBW(revised)    Nutrition Diagnosis:   · Problem: Inadequate oral intake  · Etiology: related to Alteration in GI function, Insufficient energy/nutrient consumption     Signs and symptoms:  as evidenced by NPO status due to medical condition, Nutrition support - PN    Objective Information:  · Nutrition-Focused Physical Findings: +3 pitting BUE, +2 RLE, +2 pitting LLE edema.  Irritable, agitated, mitts   · Wound Type: Stage I, Pressure Ulcer(Stage pressure ulcer on buttocks; thigh wound)  · Current Nutrition Therapies:  · Oral Diet Orders: NPO   · Oral Diet intake: NPO  · Oral Nutrition Supplement (ONS) Orders: None  · ONS intake: NPO  · Parenteral Nutrition Orders:  · Type and Formula: Premix Central, 2-in-1 Custom   · Lipids: 100ml, Daily  · Rate/Volume: 65 ml/ 1560 ml daily  · Duration: Continuous  · Current PN Order Provides: 1573 kcals, 78 gm of protein (lipids included)  · Goal PN Orders Provides: 4323-9400 kcal; 63-68 gm pro  · Anthropometric Measures:  · Ht: 5' (152.4 cm)   · Current Body Wt: 131 lb (59.4 kg)  · Admission Body Wt: 102 lb (46.3 kg)  · Ideal Body Wt: 100 lb (45.4 kg), % Ideal Body 102% based on admission wt  · BMI Classification: BMI 25.0 - 29.9 Overweight    Nutrition Interventions:   Continue NPO, Modify current Parenteral Nutrition  Continued Inpatient Monitoring, Education Not Indicated    Nutrition Evaluation:   · Evaluation: Progressing toward goals   · Goals: PN to meet greater 90% of estimated nutrition needs   · Monitoring: Weight, Pertinent Labs, Monitor Bowel Function, PN Intake, PN Tolerance, Wound Healing, I&O, Skin Integrity, Nutrition Progression      Neha Hamm R.D. L.JOSE.   Clinical Dietitian  Office: [phone]

## 2019-04-22 NOTE — FLOWSHEET NOTE
Patient expressed gratitude to be off of vent; grateful for prayer;     04/22/19 1801   Encounter Summary   Services provided to: Patient   Referral/Consult From: Rounding   Continue Visiting   (4/22/19)   Complexity of Encounter Moderate   Length of Encounter 15 minutes   Spiritual Assessment Completed Yes   Routine   Type Follow up   Spiritual/Taoist   Type Spiritual support   Assessment Approachable; Hopeful;Coping   Intervention Active listening;Prayer;Sustaining presence/ Ministry of presence; Discussed illness/injury and it's impact   Outcome Expressed gratitude;Expressed feelings/needs/concerns; Hopeful;Receptive

## 2019-04-22 NOTE — PLAN OF CARE
Problem: Risk for Impaired Skin Integrity  Goal: Tissue integrity - skin and mucous membranes  Description  Structural intactness and normal physiological function of skin and  mucous membranes. 4/22/2019 6534 by Nacho Franz RN  Outcome: Ongoing  Note:   Skin assessment complete. Coccyx reddened. Sensicare applied PRN. Turned and repositioned every two hours. Area kept free from moisture. Proper nourishment and fluids encouraged, as appropriate. Will continue to monitor for additional needs and changes in skin breakdown. Problem: Falls - Risk of:  Goal: Will remain free from falls  Description  Will remain free from falls  4/22/2019 1661 by Nacho Franz RN  Outcome: Ongoing  Note:   Pt assessed as a fall risk this shift. Remains free from falls and accidental injury at this time. Fall precautions in place, including falling star sign and fall risk band on pt. Floor free from obstacles, and bed is locked and in lowest position. Adequate lighting provided. Pt encouraged to call before getting OOB for any need. Bed alarm activated. Will continue to monitor needs during hourly rounding, and reinforce education on use of call light. Problem: Infection, Septic Shock:  Goal: Will show no infection signs and symptoms  Description  Will show no infection signs and symptoms  4/22/2019 0613 by Nacho Franz RN  Outcome: Ongoing  Note:   Patient remains afebrile; WBC count is 27.2; no signs of erythema, edema, or warmth. Will continue to monitor for signs/symptoms of infection.        Problem: Tissue Perfusion, Altered:  Goal: Circulatory function within specified parameters  Description  Circulatory function within specified parameters  4/22/2019 0613 by Nacho Franz RN  Outcome: Ongoing  Note:   Vitals:    04/22/19 0445 04/22/19 0500 04/22/19 0515 04/22/19 0530   BP: 124/75 89/60 (!) 85/48 122/73   Pulse: 119 109 113 116   Resp: 22 17 18 21   Temp: 97.7 °F (36.5 °C)      TempSrc: Axillary      SpO2: 96% 95% 95% 95%   Weight:   131 lb 13.4 oz (59.8 kg)    Height:         Pt SR/ST with Fequent PACs; adequte urine output; will continue to monitor tissue perfusion. Problem: Pain:  Goal: Pain level will decrease  Description  Pain level will decrease  4/22/2019 0613 by Cloteal Kocher, RN  Outcome: Ongoing  Note:   No pain at this time. 0/10 pain scale. Problem: Restraint Use - Nonviolent/Non-Self-Destructive Behavior:  Goal: Absence of restraint indications  Description  Absence of restraint indications   4/22/2019 0613 by Cloteal Kocher, RN  Outcome: Ongoing  Note:   Patient in bilateral mitt restraints for pulling at dyer and NG tubing. Pt educated on discontinuation criteria of restraints; Pt needs reinforcement on understanding discontinuing criteria, so does not meet criteria at this time. Will continue to monitor for non-violent, non-self destructive behaviors.

## 2019-04-22 NOTE — PROGRESS NOTES
[facility]    Date: 2019  Patient Name: Aki Nava        : 1950       [] Pt Refusal           [x] Pt Unavailable due to:       Pt sleepy post ashley tube placement today  Mehreen Ovalles  OTR/L Date: 2019 2:22

## 2019-04-22 NOTE — PROGRESS NOTES
[address]   OCCUPATIONAL THERAPY MISSED TREATMENT NOTE   INPATIENT   Date: 19  Patient Name: Mariela Worley       Room:   MRN: [id number]   Account #: [de-identified]    : 1950  (77 y.o.)  Gender: female                 REASON FOR MISSED TREATMENT:  Patient at testing and/or off the floor   -   Surgery  - Will re-attempt as able.     Ron Fenton, OT

## 2019-04-22 NOTE — PROGRESS NOTES
[address]    PROGRESS NOTE             4/22/2019    8:29 AM    Name:   Sue Kehr  MRN:     [id number]     Acct:      [de-identified]   Room:   2002/2002-01  IP Day:  11  Admit Date:  4/11/2019  2:48 PM    PCP:  Nikky Reason, DO  Code Status:  Full Code    Subjective: Interval History Status: improved. Patient seen and examined at bedside in the ICU. Afebrile, VSS. Patient appears better today visually. Alert and awake. WBC trending down. Afebrile. SBP controlled. I do not believe she is medically stable enough still at this time for the OR. GS has requested for a cholesytostomy tube to be placed by IR once she is more stable. At this time she continues to be tachycardic and is not quite ready. Pain improving. Repeat Blood cx -ve @ 3 days    CXR stable in interval.       Brief History:     Per EMR:     The patient is a 77 y.o.  Female who presents withAbdominal Pain   and she is admitted to the hospital for the management of abdominal distension, nausea, vomiting, and acute cystitis.      Patient presents with chills, nausea, vomiting, abdominal pain (diffuse, but worse in the suprapubic region), abdominal distension, and dysuria of 2-3 days duration. Denies hematemesis. Acknowledges difficulty keeping food down but tolerating fluids. States abdominal pain is a 6/10 on average, and denies trying any medication to alleviate her sx.      Denies recent antibiotic use or steroid use.      ED: Tachycardic 100-114 BP, WBC 47.9 w/neutrophils 88,  UCx from 4/2 + E. Coli > 100,000 w/suceptibility to cipro. Review of Systems:     Review of Systems   Constitutional: Negative for activity change, appetite change, chills, diaphoresis, fatigue and fever. HENT: Negative for sore throat. Eyes: Negative for discharge and redness.    Respiratory: Negative for apnea, cough, chest tightness, shortness of breath, wheezing and stridor. Cardiovascular: Negative for leg swelling. Chest pain: Left-sided. Gastrointestinal: Positive for abdominal pain. Negative for abdominal distention, constipation, diarrhea, nausea and vomiting. Genitourinary: Negative for difficulty urinating, dysuria, flank pain, frequency, hematuria and urgency. Musculoskeletal: Negative for arthralgias. Neurological: Negative for dizziness, tremors, seizures, syncope, facial asymmetry, speech difficulty, weakness, light-headedness, numbness and headaches. Hematological: Negative for adenopathy. Medications: Allergies:     Allergies   Allergen Reactions    Penicillins Shortness Of Breath and Rash    Amoxicillin        Current Meds:   Scheduled Meds:    furosemide  40 mg Intravenous Daily    methylPREDNISolone  30 mg Intravenous Q8H    metroNIDAZOLE  500 mg Intravenous Q8H    magnesium sulfate  1 g Intravenous Once    pantoprazole  40 mg Intravenous Daily    And    sodium chloride (PF)  10 mL Intravenous Daily    sodium chloride  250 mL Intravenous Once    ipratropium  0.5 mg Nebulization Q4H    insulin lispro  0-12 Units Subcutaneous Q6H    levalbuterol  1.25 mg Nebulization Q4H    docusate sodium  100 mg Oral TID    levofloxacin  500 mg Intravenous Q24H    venlafaxine  37.5 mg Oral TID    clopidogrel  75 mg Oral Daily    aspirin  81 mg Oral Daily    sodium chloride flush  10 mL Intravenous 2 times per day    enoxaparin  40 mg Subcutaneous Daily     Continuous Infusions:    PN-Adult 2-in-1 Central Line (Standard) 65 mL/hr at 04/21/19 1812    fat emulsion Stopped (04/22/19 0629)    Amiodarone 0.5 mg/min (04/22/19 0721)    norepinephrine 3 mcg/min (04/22/19 0418)    dextrose       PRN Meds: hydrOXYzine, sodium chloride flush, metoprolol, sodium chloride nebulizer, fentanNYL, oxyCODONE-acetaminophen, magnesium sulfate, sodium phosphate IVPB **OR** sodium phosphate IVPB, potassium chloride **OR** potassium alternative oral replacement **OR** potassium chloride, glucose, dextrose, glucagon (rDNA), dextrose, milk and molasses, sodium chloride flush, acetaminophen    Data:     Past Medical History:   has a past medical history of Abnormal computed tomography of cervical spine, CVA (cerebral vascular accident) ([address] GERD (gastroesophageal reflux disease), Hypertension, Paraproteinemia, and Weight loss. Social History:   reports that she has been smoking. She has a 30.00 pack-year smoking history. She has never used smokeless tobacco. She reports that she drank about 16.8 oz of alcohol per week. She reports that she does not use drugs. Family History: History reviewed. No pertinent family history. Vitals:  /82   Pulse 107   Temp 97.7 °F (36.5 °C) (Axillary)   Resp 23   Ht 5' (1.524 m)   Wt 131 lb 13.4 oz (59.8 kg)   SpO2 96%   BMI 25.75 kg/m²   Temp (24hrs), Av.6 °F (36.4 °C), Min:96.6 °F (35.9 °C), Max:98.4 °F (36.9 °C)    Recent Labs     19  1105 19  1458 19  2038 19  0319   POCGLU 140* 146* 137* 133*       I/O(24Hr): Intake/Output Summary (Last 24 hours) at 2019 0829  Last data filed at 2019 0535  Gross per 24 hour   Intake 2665 ml   Output 4225 ml   Net -1560 ml       Labs:    [unfilled]    Lab Results   Component Value Date/Time    SPECIAL  line 5cc red 5cc purple 2019 10:20 AM     Lab Results   Component Value Date/Time    CULTURE NO GROWTH 4 DAYS 2019 10:20 AM       [unfilled]    Radiology:    Skip Long Femur Left (min 2 Views)    Result Date: 3/27/2019  EXAMINATION: 4 XRAY VIEWS OF THE LEFT FEMUR; 2 XRAY VIEWS OF THE LEFT KNEE; 3 XRAY VIEWS OF THE LEFT TIBIA AND FIBULA 3/27/2019 7:00 pm COMPARISON: Left hip 2015. HISTORY: ORDERING SYSTEM PROVIDED HISTORY: pain TECHNOLOGIST PROVIDED HISTORY: pain Ordering Physician Provided Reason for Exam: pt twisted wrong, lt knee pain, unable to straighten knee.  Acuity: Acute Type of Exam: Initial FINDINGS: Left femur, Healed remote distal tibia fracture. Marked osteopenia, likely due to disuse. Xr Knee Left (3 Views)    Result Date: 3/30/2019  EXAMINATION: 3 XRAY VIEWS OF THE LEFT KNEE 3/30/2019 10:58 am COMPARISON: March 27, 2018 HISTORY: ORDERING SYSTEM PROVIDED HISTORY: F/u L knee pain after cast TECHNOLOGIST PROVIDED HISTORY: AP, oblique, lateral F/u L knee pain after cast FINDINGS: Marked osteopenia. Interval casting, which degrades fine osseous detail question cortical offset in the lateral femoral metaphysis. No malalignment identified. No significant joint effusion. Interval casting. Question nondisplaced fracture in the lateral femoral metaphysis. Osteopenia. Xr Tibia Fibula Left (2 Views)    Result Date: 3/27/2019  EXAMINATION: 4 XRAY VIEWS OF THE LEFT FEMUR; 2 XRAY VIEWS OF THE LEFT KNEE; 3 XRAY VIEWS OF THE LEFT TIBIA AND FIBULA 3/27/2019 7:00 pm COMPARISON: Left hip 11/14/2015. HISTORY: ORDERING SYSTEM PROVIDED HISTORY: pain TECHNOLOGIST PROVIDED HISTORY: pain Ordering Physician Provided Reason for Exam: pt twisted wrong, lt knee pain, unable to straighten knee. Acuity: Acute Type of Exam: Initial FINDINGS: Left femur, four views: The bones are diffusely osteopenic. No acute fracture deformity. The hip joint is maintained. The femoral head projects within the acetabulum. No focal soft tissue abnormality is seen. Left knee, two views: The knee is flexed. No acute osseous abnormality. No joint effusion. Joint spaces are not optimally profiled but no significant arthritic changes are apparent. Left tib-fib, three views: There is evidence of a remote healed distal tibia fracture. No acute fracture or dislocation is seen. No focal soft tissue abnormality. No acute osseous abnormality of the left femur. No acute osseous abnormality of the left knee. No acute osseous abnormality of the left tib-fib. Healed remote distal tibia fracture. Marked osteopenia, likely due to disuse.      Ct Abdomen proximal iliac arteries. Shotty lymph nodes are present around the aorta in the upper abdomen. No mesenteric adenopathy is noted. Bones/Soft Tissues: Degenerative changes are present in the hips and lower lumbar facets. Estimated biologic radiation dose for this procedure:258.77 mGy/cm2.     1. Dilatation of the thoracic esophagus filled with fluid. No obstructive process is noted. No adjacent enlarged lymph nodes. 2. Trace pleural fluid. 3. Marked distention of the stomach. This appears to extend to the pylorus. Partial gastric outlet obstruction is not excluded. 4. Bilateral dilated ureters without obstructing calculi. Urinary bladder is markedly distended with bladder wall air suggesting emphysematous cystitis. Dilatation of the ureters may be related to reflux or infection. 5. Atherosclerotic disease. 6. Other findings as above. Critical results were called by Dr. Natalie Andres MD to [address] [facility] [address] on 4/11/2019 at 17:37. Xr Chest Portable    Result Date: 4/12/2019  EXAMINATION: SINGLE XRAY VIEW OF THE CHEST 4/12/2019 5:26 am COMPARISON: November 14, 2015. HISTORY: ORDERING SYSTEM PROVIDED HISTORY: line placement TECHNOLOGIST PROVIDED HISTORY: line placement Ordering Physician Provided Reason for Exam: New right side line placement. Acuity: Acute Type of Exam: Initial Additional signs and symptoms: New right side line placement. FINDINGS: Stable left pectoral trans venous cardiac pacer device. New right IJ central venous catheter with tip near the superior atrial caval junction. Normal lung volume. No new consolidation. Curvilinear radiopacity projecting over the right upper lobe likely represents artifact from a skin fold. No pleural effusion or pneumothorax. Stable cardiomediastinal silhouette and great vessels with redemonstration of atherosclerotic thoracic aorta. New right IJ central venous catheter with tip near the superior atrial caval junction. No pneumothorax. No new consolidation. Physical Examination:        Physical Exam   Constitutional: She appears well-developed. She appears cachectic. She appears ill. No distress. Intubated   HENT:   Head: Normocephalic and atraumatic. Eyes: Pupils are equal, round, and reactive to light. Conjunctivae and EOM are normal.   Neck: Normal range of motion. Right IJ central line in place. Site clean and dry. Cardiovascular: Normal rate, regular rhythm, normal heart sounds and intact distal pulses. Exam reveals no gallop and no friction rub. No murmur heard. Pulmonary/Chest: Effort normal. No stridor. No respiratory distress. She has no wheezes. She has no rales. Intubated and mechanically ventilated. Abdominal: Soft. Bowel sounds are normal. She exhibits no mass. There is tenderness (Decreased tenderness to light palpation. Improvement from prior. ). There is guarding. There is no rebound. RUQ tenderness    Musculoskeletal: Normal range of motion. She exhibits no edema (Anasarca; 3+ b/l pitting edema in upper and lower ext. ). Left knee wrapped in dressing. Posterior bruising noted. Neurological:   Intubated, sedated   Skin: Skin is warm and dry. Capillary refill takes less than 2 seconds. She is not diaphoretic. No pallor. Nursing note and vitals reviewed. Assessment:        Primary Problem  Sepsis due to urinary tract infection [facility])    Active Hospital Problems    Diagnosis Date Noted    Abdominal distention [R14.0]     Elevated CEA [R97.0]     Elevated CA 19-9 level [R97.8]     Urinary retention [R33.9]     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]     Septic shock (HCC) [A41.9, R65.21]     Leukemoid reaction [D72.823]     Aspiration pneumonia of right lower lobe due to vomit ([address] [J69.0]     MRSA carrier [Z22.322]     Sepsis due to urinary tract infection ([address] [A41.9, N39.0] 04/11/2019    Cystitis [N30.90] 04/11/2019       Plan:           Septic shock 2/2 E. Coli Emphysematous Cystitis + MRSA PNA   WBC 17-->22.4-->27.5-->31.8-->27.2   Levaquin, flagyl   CXR multifocal airspace disease, left basilar consolidation/effusion   ECHO 4/12: LVEF 45%, RVSP 36 mmHg   Urology consult, appreciate recs --> cont cath until out of ICU and  stable   Critical care consult, appreciate recs   Gen Surg, right IJ central line placed on 4/12   ID consult, appreciate recs   Digoxin 125 mcg   Amiodarone gtt to keep HR <100   Fluid overloaded - lasix 40 qD    Acute Cholecystitis  U/S suggest acute ashley  HIDA did not visualze GB  WBC 17-->22.4-->27.5-->31.8-->27.2  GS consulted - appreciate recommendations  Patient not medically cleared at this time for the OR, unless emergently needs   Surgical intervention - consider tube drainage     GI Malignancy ruled out by EGD, likely presenting w/Gastroparesis   Hx of BMs during this admission   Persistent clinical abdominal distension and pain   EGD: esophagitis, hiatal hernia, solid food in 75% stomach -->  possible gastroparesis   Pepcid switched to Protonix, per GI recs   TPN   Multiple tumor markers mildly elevated --> will wait to consider  heme/onc consult, markers likely elevated 2/2 sepsis   Reglan started on 4/17 --> Daily ECG   Considering systemic autonomic dysfunction as overlying diagnosis  (gastroparesis, neurogenic bladder, hypotension/fluctuating BPs)     Anemia, likely 2/2 bleeding   Transfusion 1 U RBC on 4/14   Transfusion 2 U RBC on 4/16    Restarted Lovenox, INR wNL    Hypokalemia   Potassium sliding scale    Hypophosphatemia   Sodium phosphate to correct    Left knee remote distal tibia fx w/osteopenia   Ortho replaced brace    Maintenance   Lovenox   Dulera, Xopenex   Continue home meds trazodone, ASA, Plavix, Norvasc, Effexor, Spiriva     Dispo   Not cleared for OR today due to volume overload- will diurese and  reassess tomorrow    PT/OT Eval and treat   Social work for [address] [place] [address]  4/22/2019  8:29 AM     Electronically signed by Angel Hernandes on 4/22/2019 at 8:29 AM

## 2019-04-22 NOTE — BRIEF OP NOTE
Brief Postoperative Note    Olena Carrel  YOB: 1950  [id number]    Pre-operative Diagnosis: Acute cholecystitis nonsurgical candidate    Post-operative Diagnosis: Same    Procedure: Cholecystostomy tube placement    Anesthesia: Local    Surgeons/Assistants: Stu    Estimated Blood Loss: less than 50     Complications: None    Specimens: Was Not Obtained    Electronically signed by Dipika Pearl MD on 4/22/2019 at 1:46 PM

## 2019-04-22 NOTE — PROGRESS NOTES
upper and lower    Lab and Imaging Review     CBC  Recent Labs     04/20/19  0355 04/20/19  1618 04/21/19  0418 04/22/19  0502   WBC 31.8*  --  27.2* 21.2*   HGB 12.8 12.6 12.0 11.7*   HCT 39.5 38.5 37.3 35.7*   MCV 91.9  --  93.0 91.9     --  215 152       BMP  Recent Labs     04/20/19  0355 04/21/19  0418 04/22/19  0502    134* 136   K 4.4 3.5* 4.9   CL 99 95* 97*   CO2 30 28 29   BUN 16 15 22   CREATININE <0.40* <0.40* <0.40*   GLUCOSE 139* 152* 208*   CALCIUM 8.2* 7.3* 7.5*       LFTS  Recent Labs     04/20/19  0355 04/21/19  0418 04/22/19  0502   ALKPHOS 132* 111* 104   ALT 24 27 24   AST 41* 41* 35*   PROT 5.2* 4.6* 4.4*   BILITOT 0.62 0.47 0.43   LABALBU 2.5* 2.2* 1.8*       AMYLASE/LIPASE/AMMONIA  Recent Labs     04/20/19  0355 04/21/19  0418   AMYLASE 267* 258*   LIPASE 60 68*       PT/INR  Recent Labs     04/20/19  0355 04/21/19  0418 04/22/19  0502   PROTIME 13.9 14.5 15.1*   INR 1.1 1.1 1.2     Results for Sania Christensen (MRN [id number]) as of 4/20/2019 09:15    Ref. Range 4/15/2019 11:10    Latest Ref Range: <38 U/mL 62 (H)   CA 19-9 Latest Ref Range: 0 - 35 U/mL 49 (H)   CEA Latest Ref Range: <3.9 ng/mL 5.6 (H)      FINDINGS:hida 4/20/19   Prompt, homogenous uptake by the liver is noted with normal appearance of   radiotracer excretion into the biliary system.  Clearance of bloodpool   activity appears appropriate.       Normal small bowel activity is seen.  Prominent activity within the common   bile duct.  The gallbladder is not visualized by the end of the exam.           Impression   Absent filling of the gallbladder consistent with acute cholecystitis. FINDINGS:ct abd 4/14/19   Lower Chest: New moderate layering bilateral pleural effusions with bilateral   lower lobe atelectasis.       Organs: Limited evaluation due lack of intravenous contrast.  Cholelithiasis   redemonstrated.  No gallbladder wall thickening or biliary ductal dilatation.    Scattered tiny hypodense lesions in the liver are too small to characterize   but statistically represent benign cysts or hemangiomas and appear unchanged. The pancreas, spleen, adrenal glands, and kidneys are unremarkable. Fitzwilliam Plump is   no hydronephrosis or urinary tract calculus.       GI/Bowel: The stomach is distended.  Enteric tube is in place.  No contrast   is seen distal to the pylorus and there is contrast reflux into the distal   esophagus. Fitzwilliam Plump is no evidence of bowel obstruction.  The appendix is not   definitely visualized.  No focal pericecal inflammatory changes are evident.       Pelvis: The urinary bladder is decompressed by Tran catheter.  No pelvic   mass is seen.       Peritoneum/Retroperitoneum: Small amount of free fluid in the pelvic cavity. No free air or focal fluid collection.  No abnormal lymph node.  Normal   abdominal aortic caliber.  Moderate calcific atherosclerosis.       Bones/Soft Tissues: No acute osseous abnormality.  Diffuse anasarca. Moderate degenerative changes in the lumbar spine.           Impression   1. Distended, contrast filled stomach with reflux of contrast into the   esophagus.  No enteric contrast is seen distal to the pylorus suggesting   delayed gastric emptying in the setting of ileus. 2. New moderate layering bilateral pleural effusions with bilateral lower   lobe atelectasis. 3. Small amount of nonspecific free fluid in the pelvic cavity. 4. Anasarca. 5. Cholelithiasis.        FINDINGS:GB US 4/19/19   Pancreas is not well visualized.       No liver lesion.       Gallbladder wall thickening.  Gallbladder sludge.  Cholelithiasis.    Pericholecystic fluid.       Common bile duct measures at the upper limits of normal at 7 mm.           Impression   Findings suggesting acute cholecystitis.      ESOPHAGOGASTRODUODENOSCOPY   ( EGD )  DATE OF PROCEDURE: 4/16/2019      Clayton Blas MD        POSTOPERATIVE DIAGNOSIS: Patient has esophagitis.     Has a large amount of retained solid food in the upper part of the stomach and fundus.  Antrum is clear.  Pylorus wide open and did not show signs of stenosis.     OPERATION: Upper GI endoscopy          Findings:     Retropharyngeal area was grossly normal appearing     Esophagus: abnormal: Has a grade 2 esophagitis.  Appears peptic esophagitis.  Has a small sliding hiatal hernia.     Stomach:  Fundus of the stomach and body of the stomach.  With solid food.  75% of the lumen is occupied with solid food.     Antrum: No retained food.  Able to advance the scope through the pylorus without difficulty.  No pyloric stenosis seen.  No signs of outlet obstruction.     Duodenum:     Descending: normal    Bulb: normal  Minimal liquid present in the 2nd part of the duodenum.       ASSESSMENT/PLAN:  1. Abdominal pain, HIDA showing absent filling of the gallbladder suggesting cholecystitis, likely pancreatitis given pain and elevated amylase, todays amylase is pending  -likely cholecystostomy tube placement today; will discuss removing the NG with Dr Teresa Irene since distention is improved and she has had several bowel movements  -NPO for procedure  -abx per ID      2. Anemia; hgb 11.7 no overt bleeding EGD showed grade 2 esophagitis  -continue to trend the h/h  -continue the ppi    This plan was formulated in collaboration with Dr. Teresa Irene .     Electronically signed by: AGAPITO Garcia CNP on 4/22/2019 at 7:40 AM

## 2019-04-22 NOTE — PROGRESS NOTES
General Surgery Progress Note            PATIENT NAME: Petrona Kline     TODAY'S DATE: 4/22/2019, 8:37 AM    SUBJECTIVE/OBJECTIVE:      VITALS:  /82   Pulse 107   Temp 97.7 °F (36.5 °C) (Axillary)   Resp 23   Ht 5' (1.524 m)   Wt 131 lb 13.4 oz (59.8 kg)   SpO2 96%   BMI 25.75 kg/m²      Patient seen and examined  Awake. Alert and oriented X 3. Non-toxic  Tachy RR  Lungs diminished  Abdomen soft, ND. Tender RUQ.  +BS  2+ peripheral edema    INTAKE/OUTPUT:      Intake/Output Summary (Last 24 hours) at 4/22/2019 0837  Last data filed at 4/22/2019 0535  Gross per 24 hour   Intake 2665 ml   Output 4225 ml   Net -1560 ml       CBC with Differential:    Lab Results   Component Value Date    WBC 21.2 04/22/2019    RBC 3.88 04/22/2019    RBC 3.66 05/23/2012    HGB 11.7 04/22/2019    HCT 35.7 04/22/2019     04/22/2019     05/23/2012    MCV 91.9 04/22/2019    MCH 30.1 04/22/2019    MCHC 32.7 04/22/2019    RDW 14.9 04/22/2019    METASPCT 2 04/11/2019    LYMPHOPCT 4 04/11/2019    MONOPCT 2 04/11/2019    BASOPCT 0 04/11/2019    MONOSABS 0.96 04/11/2019    LYMPHSABS 1.92 04/11/2019    EOSABS 0.00 04/11/2019    BASOSABS 0.00 04/11/2019    DIFFTYPE NOT REPORTED 04/11/2019     CMP:    Lab Results   Component Value Date     04/22/2019    K 4.9 04/22/2019    CL 97 04/22/2019    CO2 29 04/22/2019    BUN 22 04/22/2019    CREATININE <0.40 04/22/2019    GFRAA CANNOT BE CALCULATED 04/22/2019    LABGLOM CANNOT BE CALCULATED 04/22/2019    GLUCOSE 208 04/22/2019    GLUCOSE 87 05/21/2012    PROT 4.4 04/22/2019    LABALBU 1.8 04/22/2019    LABALBU 4.6 05/17/2012    CALCIUM 7.5 04/22/2019    BILITOT 0.43 04/22/2019    ALKPHOS 104 04/22/2019    AST 35 04/22/2019    ALT 24 04/22/2019           ASSESSMENT/PLAN:    Principal Problem:    Sepsis due to urinary tract infection ([address]  Active Problems:    Cystitis    Emphysematous cystitis    Septic shock (HCC)    Leukemoid reaction    Aspiration pneumonia of right lower lobe due to vomit (HCC)    MRSA carrier    Urinary retention    Abdominal distention    Elevated CEA    Elevated CA 19-9 level  Resolved Problems:    * No resolved hospital problems.  *    Acute cholecystitis  IR for cholecystostomy tube placement today  Continue antibiotics per ID

## 2019-04-23 LAB
ALBUMIN SERPL-MCNC: 1.8 G/DL (ref 3.5–5.2)
ALBUMIN/GLOBULIN RATIO: ABNORMAL (ref 1–2.5)
ALP BLD-CCNC: 87 U/L (ref 35–104)
ALT SERPL-CCNC: 24 U/L (ref 5–33)
AMYLASE: 215 U/L (ref 28–100)
ANION GAP SERPL CALCULATED.3IONS-SCNC: 10 MMOL/L (ref 9–17)
AST SERPL-CCNC: 27 U/L
BILIRUB SERPL-MCNC: 0.3 MG/DL (ref 0.3–1.2)
BUN BLDV-MCNC: 27 MG/DL (ref 8–23)
BUN/CREAT BLD: ABNORMAL (ref 9–20)
C-REACTIVE PROTEIN: 74 MG/L (ref 0–5)
CALCIUM SERPL-MCNC: 7.2 MG/DL (ref 8.6–10.4)
CHLORIDE BLD-SCNC: 98 MMOL/L (ref 98–107)
CO2: 27 MMOL/L (ref 20–31)
CREAT SERPL-MCNC: <0.4 MG/DL (ref 0.5–0.9)
DATE, STOOL #1: ABNORMAL
DATE, STOOL #2: ABNORMAL
DATE, STOOL #3: ABNORMAL
DIRECT EXAM: NORMAL
DIRECT EXAM: NORMAL
GFR AFRICAN AMERICAN: ABNORMAL ML/MIN
GFR NON-AFRICAN AMERICAN: ABNORMAL ML/MIN
GFR SERPL CREATININE-BSD FRML MDRD: ABNORMAL ML/MIN/{1.73_M2}
GFR SERPL CREATININE-BSD FRML MDRD: ABNORMAL ML/MIN/{1.73_M2}
GLUCOSE BLD-MCNC: 125 MG/DL (ref 65–105)
GLUCOSE BLD-MCNC: 131 MG/DL (ref 70–99)
GLUCOSE BLD-MCNC: 135 MG/DL (ref 65–105)
GLUCOSE BLD-MCNC: 135 MG/DL (ref 65–105)
GLUCOSE BLD-MCNC: 176 MG/DL (ref 65–105)
HCT VFR BLD CALC: 33.6 % (ref 36–46)
HEMOCCULT SP1 STL QL: POSITIVE
HEMOCCULT SP2 STL QL: ABNORMAL
HEMOCCULT SP3 STL QL: ABNORMAL
HEMOGLOBIN: 10.9 G/DL (ref 12–16)
INR BLD: 1.3
Lab: NORMAL
Lab: NORMAL
MAGNESIUM: 1.8 MG/DL (ref 1.6–2.6)
MCH RBC QN AUTO: 29.6 PG (ref 26–34)
MCHC RBC AUTO-ENTMCNC: 32.4 G/DL (ref 31–37)
MCV RBC AUTO: 91.3 FL (ref 80–100)
MICRO OVA & PARASITES: NORMAL
NRBC AUTOMATED: ABNORMAL PER 100 WBC
PDW BLD-RTO: 15.2 % (ref 11.5–14.9)
PHOSPHORUS: 4 MG/DL (ref 2.6–4.5)
PLATELET # BLD: 138 K/UL (ref 150–450)
PMV BLD AUTO: 9.4 FL (ref 6–12)
POTASSIUM SERPL-SCNC: 4.1 MMOL/L (ref 3.7–5.3)
PREALBUMIN: 19.7 MG/DL (ref 20–40)
PROCALCITONIN: 0.14 NG/ML
PROTHROMBIN TIME: 16.5 SEC (ref 11.8–14.6)
RBC # BLD: 3.68 M/UL (ref 4–5.2)
SEDIMENTATION RATE, ERYTHROCYTE: 25 MM (ref 0–20)
SODIUM BLD-SCNC: 135 MMOL/L (ref 135–144)
SPECIMEN DESCRIPTION: NORMAL
SPECIMEN DESCRIPTION: NORMAL
TIME, STOOL #1: 1800
TIME, STOOL #2: ABNORMAL
TIME, STOOL #3: ABNORMAL
TOTAL PROTEIN: 4.2 G/DL (ref 6.4–8.3)
WBC # BLD: 17.9 K/UL (ref 3.5–11)

## 2019-04-23 PROCEDURE — 99233 SBSQ HOSP IP/OBS HIGH 50: CPT | Performed by: INTERNAL MEDICINE

## 2019-04-23 PROCEDURE — 6360000002 HC RX W HCPCS: Performed by: STUDENT IN AN ORGANIZED HEALTH CARE EDUCATION/TRAINING PROGRAM

## 2019-04-23 PROCEDURE — 36592 COLLECT BLOOD FROM PICC: CPT

## 2019-04-23 PROCEDURE — 87210 SMEAR WET MOUNT SALINE/INK: CPT

## 2019-04-23 PROCEDURE — 2000000000 HC ICU R&B

## 2019-04-23 PROCEDURE — 94762 N-INVAS EAR/PLS OXIMTRY CONT: CPT

## 2019-04-23 PROCEDURE — 80053 COMPREHEN METABOLIC PANEL: CPT

## 2019-04-23 PROCEDURE — 94645 CONT INHLJ TX EACH ADDL HOUR: CPT

## 2019-04-23 PROCEDURE — 6360000002 HC RX W HCPCS: Performed by: INTERNAL MEDICINE

## 2019-04-23 PROCEDURE — 87506 IADNA-DNA/RNA PROBE TQ 6-11: CPT

## 2019-04-23 PROCEDURE — 94640 AIRWAY INHALATION TREATMENT: CPT

## 2019-04-23 PROCEDURE — 2580000003 HC RX 258: Performed by: INTERNAL MEDICINE

## 2019-04-23 PROCEDURE — G0328 FECAL BLOOD SCRN IMMUNOASSAY: HCPCS

## 2019-04-23 PROCEDURE — 2500000003 HC RX 250 WO HCPCS: Performed by: STUDENT IN AN ORGANIZED HEALTH CARE EDUCATION/TRAINING PROGRAM

## 2019-04-23 PROCEDURE — 6370000000 HC RX 637 (ALT 250 FOR IP): Performed by: INTERNAL MEDICINE

## 2019-04-23 PROCEDURE — 2700000000 HC OXYGEN THERAPY PER DAY

## 2019-04-23 PROCEDURE — 97166 OT EVAL MOD COMPLEX 45 MIN: CPT

## 2019-04-23 PROCEDURE — 2500000003 HC RX 250 WO HCPCS: Performed by: INTERNAL MEDICINE

## 2019-04-23 PROCEDURE — 87209 SMEAR COMPLEX STAIN: CPT

## 2019-04-23 PROCEDURE — 85610 PROTHROMBIN TIME: CPT

## 2019-04-23 PROCEDURE — 84134 ASSAY OF PREALBUMIN: CPT

## 2019-04-23 PROCEDURE — 85651 RBC SED RATE NONAUTOMATED: CPT

## 2019-04-23 PROCEDURE — 84145 PROCALCITONIN (PCT): CPT

## 2019-04-23 PROCEDURE — 87015 SPECIMEN INFECT AGNT CONCNTJ: CPT

## 2019-04-23 PROCEDURE — 87328 CRYPTOSPORIDIUM AG IA: CPT

## 2019-04-23 PROCEDURE — APPNB30 APP NON BILLABLE TIME 0-30 MINS: Performed by: NURSE PRACTITIONER

## 2019-04-23 PROCEDURE — 82947 ASSAY GLUCOSE BLOOD QUANT: CPT

## 2019-04-23 PROCEDURE — 85027 COMPLETE CBC AUTOMATED: CPT

## 2019-04-23 PROCEDURE — 99232 SBSQ HOSP IP/OBS MODERATE 35: CPT | Performed by: INTERNAL MEDICINE

## 2019-04-23 PROCEDURE — 6370000000 HC RX 637 (ALT 250 FOR IP): Performed by: STUDENT IN AN ORGANIZED HEALTH CARE EDUCATION/TRAINING PROGRAM

## 2019-04-23 PROCEDURE — C9113 INJ PANTOPRAZOLE SODIUM, VIA: HCPCS | Performed by: INTERNAL MEDICINE

## 2019-04-23 PROCEDURE — 83735 ASSAY OF MAGNESIUM: CPT

## 2019-04-23 PROCEDURE — 86140 C-REACTIVE PROTEIN: CPT

## 2019-04-23 PROCEDURE — 84100 ASSAY OF PHOSPHORUS: CPT

## 2019-04-23 PROCEDURE — 82150 ASSAY OF AMYLASE: CPT

## 2019-04-23 PROCEDURE — 87329 GIARDIA AG IA: CPT

## 2019-04-23 RX ORDER — METOCLOPRAMIDE HYDROCHLORIDE 5 MG/ML
10 INJECTION INTRAMUSCULAR; INTRAVENOUS EVERY 6 HOURS
Status: DISCONTINUED | OUTPATIENT
Start: 2019-04-23 | End: 2019-05-03

## 2019-04-23 RX ADMIN — VENLAFAXINE 37.5 MG: 37.5 TABLET ORAL at 09:09

## 2019-04-23 RX ADMIN — IPRATROPIUM BROMIDE 0.5 MG: 0.5 SOLUTION RESPIRATORY (INHALATION) at 08:42

## 2019-04-23 RX ADMIN — IPRATROPIUM BROMIDE 0.5 MG: 0.5 SOLUTION RESPIRATORY (INHALATION) at 15:55

## 2019-04-23 RX ADMIN — METRONIDAZOLE 500 MG: 500 INJECTION, SOLUTION INTRAVENOUS at 13:12

## 2019-04-23 RX ADMIN — IPRATROPIUM BROMIDE 0.5 MG: 0.5 SOLUTION RESPIRATORY (INHALATION) at 03:36

## 2019-04-23 RX ADMIN — LEVOFLOXACIN 500 MG: 5 INJECTION, SOLUTION INTRAVENOUS at 16:12

## 2019-04-23 RX ADMIN — METHYLPREDNISOLONE SODIUM SUCCINATE 20 MG: 40 INJECTION, POWDER, FOR SOLUTION INTRAMUSCULAR; INTRAVENOUS at 00:53

## 2019-04-23 RX ADMIN — LEVALBUTEROL 1.25 MG: 1.25 SOLUTION, CONCENTRATE RESPIRATORY (INHALATION) at 19:46

## 2019-04-23 RX ADMIN — IPRATROPIUM BROMIDE 0.5 MG: 0.5 SOLUTION RESPIRATORY (INHALATION) at 23:52

## 2019-04-23 RX ADMIN — ASPIRIN 81 MG: 81 TABLET, COATED ORAL at 09:24

## 2019-04-23 RX ADMIN — AMIODARONE HYDROCHLORIDE 0.5 MG/MIN: 1.8 INJECTION, SOLUTION INTRAVENOUS at 06:28

## 2019-04-23 RX ADMIN — METRONIDAZOLE 500 MG: 500 INJECTION, SOLUTION INTRAVENOUS at 03:23

## 2019-04-23 RX ADMIN — IPRATROPIUM BROMIDE 0.5 MG: 0.5 SOLUTION RESPIRATORY (INHALATION) at 11:56

## 2019-04-23 RX ADMIN — IPRATROPIUM BROMIDE 0.5 MG: 0.5 SOLUTION RESPIRATORY (INHALATION) at 19:45

## 2019-04-23 RX ADMIN — LEVALBUTEROL 1.25 MG: 1.25 SOLUTION, CONCENTRATE RESPIRATORY (INHALATION) at 11:56

## 2019-04-23 RX ADMIN — DOCUSATE SODIUM 100 MG: 100 CAPSULE, LIQUID FILLED ORAL at 09:24

## 2019-04-23 RX ADMIN — LEVALBUTEROL 1.25 MG: 1.25 SOLUTION, CONCENTRATE RESPIRATORY (INHALATION) at 23:52

## 2019-04-23 RX ADMIN — METHYLPREDNISOLONE SODIUM SUCCINATE 20 MG: 40 INJECTION, POWDER, FOR SOLUTION INTRAMUSCULAR; INTRAVENOUS at 13:32

## 2019-04-23 RX ADMIN — I.V. FAT EMULSION 100 ML: 20 EMULSION INTRAVENOUS at 18:23

## 2019-04-23 RX ADMIN — Medication 10 ML: at 09:09

## 2019-04-23 RX ADMIN — LEVALBUTEROL 1.25 MG: 1.25 SOLUTION, CONCENTRATE RESPIRATORY (INHALATION) at 03:35

## 2019-04-23 RX ADMIN — INSULIN LISPRO 2 UNITS: 100 INJECTION, SOLUTION INTRAVENOUS; SUBCUTANEOUS at 16:12

## 2019-04-23 RX ADMIN — AMIODARONE HYDROCHLORIDE 0.5 MG/MIN: 1.8 INJECTION, SOLUTION INTRAVENOUS at 19:45

## 2019-04-23 RX ADMIN — METOCLOPRAMIDE 10 MG: 5 INJECTION, SOLUTION INTRAMUSCULAR; INTRAVENOUS at 13:49

## 2019-04-23 RX ADMIN — METRONIDAZOLE 500 MG: 500 INJECTION, SOLUTION INTRAVENOUS at 22:36

## 2019-04-23 RX ADMIN — CLOPIDOGREL BISULFATE 75 MG: 75 TABLET ORAL at 09:24

## 2019-04-23 RX ADMIN — VENLAFAXINE 37.5 MG: 37.5 TABLET ORAL at 13:15

## 2019-04-23 RX ADMIN — LEVALBUTEROL 1.25 MG: 1.25 SOLUTION, CONCENTRATE RESPIRATORY (INHALATION) at 08:42

## 2019-04-23 RX ADMIN — METOCLOPRAMIDE 10 MG: 5 INJECTION, SOLUTION INTRAMUSCULAR; INTRAVENOUS at 22:35

## 2019-04-23 RX ADMIN — HYDROXYZINE HYDROCHLORIDE 50 MG: 25 TABLET, FILM COATED ORAL at 13:15

## 2019-04-23 RX ADMIN — LEVALBUTEROL 1.25 MG: 1.25 SOLUTION, CONCENTRATE RESPIRATORY (INHALATION) at 15:54

## 2019-04-23 RX ADMIN — ENOXAPARIN SODIUM 40 MG: 100 INJECTION SUBCUTANEOUS at 09:24

## 2019-04-23 RX ADMIN — FUROSEMIDE 40 MG: 10 INJECTION, SOLUTION INTRAMUSCULAR; INTRAVENOUS at 09:24

## 2019-04-23 RX ADMIN — PANTOPRAZOLE SODIUM 40 MG: 40 INJECTION, POWDER, FOR SOLUTION INTRAVENOUS at 09:09

## 2019-04-23 RX ADMIN — CALCIUM GLUCONATE: 94 INJECTION, SOLUTION INTRAVENOUS at 18:23

## 2019-04-23 ASSESSMENT — ENCOUNTER SYMPTOMS
SORE THROAT: 0
CHEST TIGHTNESS: 0
WHEEZING: 0
STRIDOR: 0
ABDOMINAL PAIN: 0
ABDOMINAL DISTENTION: 0
APNEA: 0
VOMITING: 0
COUGH: 0
NAUSEA: 0
EYE REDNESS: 0
EYE DISCHARGE: 0
DIARRHEA: 0
CONSTIPATION: 0
SHORTNESS OF BREATH: 0

## 2019-04-23 ASSESSMENT — PAIN SCALES - GENERAL: PAINLEVEL_OUTOF10: 0

## 2019-04-23 NOTE — PLAN OF CARE
Problem: Falls - Risk of:  Goal: Will remain free from falls  Description  Will remain free from falls  4/23/2019 0432 by Tunde Talavera RN  Outcome: Met This Shift     Problem: Falls - Risk of:  Goal: Absence of physical injury  Description  Absence of physical injury  Outcome: Met This Shift     Problem: Pain:  Goal: Control of acute pain  Description  Control of acute pain  Outcome: Met This Shift     Problem: Risk for Impaired Skin Integrity  Goal: Tissue integrity - skin and mucous membranes  Description  Structural intactness and normal physiological function of skin and  mucous membranes.   4/23/2019 0432 by Tunde Talavera RN  Outcome: Ongoing     Problem: Infection, Septic Shock:  Goal: Will show no infection signs and symptoms  Description  Will show no infection signs and symptoms  Outcome: Ongoing     Problem: Tissue Perfusion, Altered:  Goal: Circulatory function within specified parameters  Description  Circulatory function within specified parameters  Outcome: Ongoing     Problem: Nutrition  Goal: Optimal nutrition therapy  Description       Outcome: Ongoing

## 2019-04-23 NOTE — PROGRESS NOTES
Nutrition Assessment (Parenteral Nutrition)    Type and Reason for Visit: Reassess    Nutrition Recommendations: Add Ensure Clear each meal and follow for acceptance. Following changes made in additives: NaCl- 105 to 115 mEq, K Phos- 37 to 34 mmol, no MVI or trace elements. Nutrition Assessment: Pt is stable from a nutritional viewpoint with TPN & lipids to continue with clear liquid diet started; adding Ensure Clear each meal to provide high quality protein with essential nutrients. Malnutrition Assessment:  · Malnutrition Status: At risk for malnutrition  · Context: Acute illness or injury  · Findings of the 6 clinical characteristics of malnutrition (Minimum of 2 out of 6 clinical characteristics is required to make the diagnosis of moderate or severe Protein Calorie Malnutrition based on AND/ASPEN Guidelines):  1. Energy Intake-Less than or equal to 75% of estimated energy requirement(Previously; improving with parenteral nutrition), Greater than or equal to 5 days    2. Weight Loss-Unable to assess(edema present),    3. Fat Loss-Unable to assess,    4. Muscle Loss-Unable to assess,    5. Fluid Accumulation-(moderate fluid accumulation), Extremities  6.  Strength-Not measured    Nutrition Risk Level: High    Nutrient Needs:  · Estimated Daily Total Kcal: 7863-9081 based on wt of 25-27 per kg using wt of 57.2 kg  · Estimated Daily Protein (g): 63-68 based on 1.4-1.5 gm/kg IBW(revised)    Nutrition Diagnosis:   · Problem: Inadequate oral intake  · Etiology: related to Alteration in GI function, Insufficient energy/nutrient consumption     Signs and symptoms:  as evidenced by Nutrition support - EN    Objective Information:  · Nutrition-Focused Physical Findings: +3 pitting BUE, +2 RLE, +2 pitting LLE edema.  Irritable, agitated, mitts   · Wound Type: Stage I, Pressure Ulcer(Stage pressure ulcer on buttocks; thigh wound)  · Current Nutrition Therapies:  · Oral Diet Orders: Clear Liquid   · Oral Diet intake: 26-50%, 1-25%  · Oral Nutrition Supplement (ONS) Orders: None  · ONS intake: NPO  · Parenteral Nutrition Orders:  · Type and Formula: Premix Central, 2-in-1 Custom   · Lipids: 100ml, Daily  · Rate/Volume: 65 ml/ 1560 ml daily  · Duration: Continuous  · Current PN Order Provides: 1573 kcals, 78 gm of protein (lipids included)  · Goal PN Orders Provides: 3818-1772 kcal; 63-68 gm pro  · Anthropometric Measures:  · Ht: 5' (152.4 cm)   · Current Body Wt: 131 lb (59.4 kg)  · Admission Body Wt: 102 lb (46.3 kg)  · Ideal Body Wt: 100 lb (45.4 kg), % Ideal Body 102% based on admission wt  · BMI Classification: BMI 25.0 - 29.9 Overweight    Nutrition Interventions:   Continue current diet, Start ONS, Modify current Parenteral Nutrition  Continued Inpatient Monitoring, Education Not Indicated    Nutrition Evaluation:   · Evaluation: Progressing toward goals   · Goals: PN to meet greater 90% of estimated nutrition needs   · Monitoring: Meal Intake, Nutrition Progression, Supplement Intake, PN Intake, Weight, Mental Status/Confusion, Pertinent Labs, Chewing/Swallowing, Diet Tolerance, Skin Integrity, I&O, Monitor Bowel Function      Sheppard Dance R.D., L.D.   Clinical Dietitian  Office: [phone]

## 2019-04-23 NOTE — FLOWSHEET NOTE
04/23/19 1159   Encounter Summary   Services provided to: Patient   Referral/Consult From: Ольга   Continue Visiting   (4/23/19V)   Volunteer Visit Yes   Complexity of Encounter Low   Length of Encounter 15 minutes   Routine   Type Follow up   Spiritual/Druze   Type Spiritual support   Intervention Communion   Sacraments   Communion Patient received communion

## 2019-04-23 NOTE — PROGRESS NOTES
Dr. Khadijah Walsh notified regarding patient's episode of coffee ground emesis.     Electronically signed by Raegan Flores RN on 4/23/2019 at 4:00 PM

## 2019-04-23 NOTE — PROGRESS NOTES
Rash    Amoxicillin        MEDICATIONS    No current facility-administered medications on file prior to encounter. Current Outpatient Medications on File Prior to Encounter   Medication Sig Dispense Refill    oxyCODONE-acetaminophen (PERCOCET) 5-325 MG per tablet Take 1 tablet by mouth every 6 hours as needed for Pain.  senna-docusate (PERICOLACE) 8.6-50 MG per tablet Take 1 tablet by mouth 2 times daily      budesonide-formoterol (SYMBICORT) 160-4.5 MCG/ACT AERO Inhale 2 puffs into the lungs 2 times daily      sucralfate (CARAFATE) 1 GM/10ML suspension Take 1 g by mouth 4 times daily       traZODone (DESYREL) 50 MG tablet Take 50 mg by mouth nightly      tiZANidine (ZANAFLEX) 2 MG tablet Take 2 mg by mouth every 8 hours as needed (left knee)       aspirin 81 MG tablet Take 81 mg by mouth daily      clopidogrel (PLAVIX) 75 MG tablet TAKE 1 TABLET DAILY 30 tablet 2    DOCQLACE 100 MG capsule TAKE 1 CAPSULE BY MOUTH IN THE MORNING & IN THE EVENING -DRINK PLENTYOF WATER WHILE TAKING THIS MEDICINE 30 capsule 1    amLODIPine (NORVASC) 10 MG tablet Take 10 mg by mouth daily.  LORazepam (ATIVAN) 0.5 MG tablet Take 0.5 mg by mouth every 6 hours as needed for Anxiety.  therapeutic multivitamin-minerals (THERAGRAN-M) tablet Take 1 tablet by mouth daily.  omeprazole (PRILOSEC) 20 MG capsule Take 20 mg by mouth daily.  venlafaxine (EFFEXOR) 37.5 MG tablet Take 37.5 mg by mouth 3 times daily.  Misc. Devices [facility] [address] [facility] wheelchair with left fupper extremity support  Dx: stroke with left hemiparesis.  1 each 0       Objective    BP (!) 87/48   Pulse 88   Temp 98.5 °F (36.9 °C) (Axillary)   Resp 24   Ht 5' (1.524 m)   Wt 131 lb 13.4 oz (59.8 kg)   SpO2 92%   BMI 25.75 kg/m²     LABS:  WBC:    Lab Results   Component Value Date    WBC 17.9 04/23/2019     H/H:    Lab Results   Component Value Date    HGB 10.9 04/23/2019    HCT 33.6 04/23/2019     PTT:    Lab Results Component Value Date    APTT 27.7 03/28/2012   [APTT}  PT/INR:    Lab Results   Component Value Date    PROTIME 16.5 04/23/2019    PROTIME 10.9 03/28/2012    INR 1.3 04/23/2019     HgBA1c:    Lab Results   Component Value Date    LABA1C 5.3 04/12/2019       Assessment   Collin Risk Score: Collin Scale Score: 14    Patient Active Problem List   Diagnosis Code    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    CVA (cerebral vascular accident) ([facility] [address] I63.9    Abnormal computed tomography of cervical spine R93.7    Weight loss R63.4    Functional gait abnormality R26.89    Left knee pain M25.562    Knee pain, left M25.562    Acute pain of left knee M25.562    Sepsis due to urinary tract infection (HCC) A41.9, N39.0    Cystitis N30.90    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    Septic shock (HCC) A41.9, R65.21    Leukemoid reaction D72.823    Aspiration pneumonia of right lower lobe due to vomit ([facility] [address] J69.0    MRSA carrier Z22.322    Urinary retention R33.9    Abdominal distention R14.0    Elevated CEA R97.0    Elevated CA 19-9 level R97.8    Cholecystitis K81.9       Measurements:  Wound 04/11/19 Coccyx Mid (Active)   Wound Pressure Stage  1 4/19/2019  4:15 AM   Dressing Status Other (Comment) 4/19/2019  4:15 AM   Dressing/Treatment Open to air;Protective barrier 4/22/2019  4:00 PM   Wound Cleansed Other (Comment) 4/17/2019  8:00 PM   Wound Assessment Red;Dry; Intact; Clean 4/22/2019  4:00 PM   Drainage Amount None 4/22/2019  4:00 PM   Beverly-wound Assessment Clean;Dry; Intact 4/22/2019  4:25 AM   Number of days: 11       Wound 04/15/19 Thigh Left;Posterior (Active)   Wound Image   4/15/2019 11:37 AM   Wound Deep tissue/Injury 4/23/2019 10:06 AM   Dressing Status Clean;Dry 4/23/2019 10:06 AM   Dressing Changed Dressing reinforced 4/17/2019  8:00 PM   Dressing/Treatment Foam 4/23/2019 10:06 AM   Dressing Change Due 04/18/19 4/15/2019 11:37 AM   Wound Length (cm) 1 cm 4/15/2019 11:37 AM   Wound Width (cm) 14 cm 4/15/2019 11:37 AM Wound Surface Area (cm^2) 14 cm^2 4/15/2019 11:37 AM   Wound Assessment Clean;Dry;Purple 4/23/2019 10:06 AM   Drainage Amount None 4/23/2019 10:06 AM   Odor None 4/23/2019 10:06 AM   Beverly-wound Assessment Intact 4/23/2019 10:06 AM   Number of days: 7            Response to treatment:  Well tolerated by patient.        Plan   Plan of Care: Wound 04/11/19 Coccyx Mid-Dressing/Treatment: Open to air, Protective barrier  Wound 04/15/19 Thigh Left;Posterior-Dressing/Treatment: Foam    Specialty Bed Required : Yes   [] Low Air Loss   [x] Pressure Redistribution  [] Fluid Immersion  [] Bariatric  [] Total Pressure Relief  [] Other:     Current Diet: PN-Adult 2-in-1 Central Line (Standard)  DIET CLEAR LIQUID;  Dietary Nutrition Supplements: Clear Liquid Oral Supplement  Dietician consult:  Yes      Referrals:  []   [] [facility]  [] Supplies  [] Other    Patient/Caregiver Teaching:  Level of patient/caregiver understanding able to:   [] Indicates understanding       [x] Needs reinforcement  [] Unsuccessful      [] Verbal Understanding  [] Demonstrated understanding       [] No evidence of learning  [] Refused teaching         [] N/A       Electronically signed by AGAPITO Magallanes CNP, LUANNE on 4/23/2019 at 10:08 AM

## 2019-04-23 NOTE — PLAN OF CARE
Problem: Falls - Risk of:  Goal: Will remain free from falls  Description  Will remain free from falls  4/23/2019 1501 by Denise Christian RN  Outcome: Met This Shift  4/23/2019 0432 by Amaury Parada RN  Outcome: Met This Shift  Goal: Absence of physical injury  Description  Absence of physical injury  4/23/2019 1501 by Denise Christian RN  Outcome: Met This Shift  4/23/2019 0432 by Amaury Parada RN  Outcome: Met This Shift     Problem: Pain:  Goal: Pain level will decrease  Description  Pain level will decrease  Outcome: Met This Shift  Goal: Control of acute pain  Description  Control of acute pain  4/23/2019 1501 by Denise Christian RN  Outcome: Met This Shift  4/23/2019 0432 by Amaury Parada RN  Outcome: Met This Shift     Problem: Risk for Impaired Skin Integrity  Goal: Tissue integrity - skin and mucous membranes  Description  Structural intactness and normal physiological function of skin and  mucous membranes.   4/23/2019 1501 by Denise Christian RN  Outcome: Ongoing  4/23/2019 0432 by Amaury Parada RN  Outcome: Ongoing     Problem: Infection, Septic Shock:  Goal: Will show no infection signs and symptoms  Description  Will show no infection signs and symptoms  4/23/2019 1501 by Denise Christian RN  Outcome: Ongoing  4/23/2019 0432 by Amaury Parada RN  Outcome: Ongoing     Problem: Tissue Perfusion, Altered:  Goal: Circulatory function within specified parameters  Description  Circulatory function within specified parameters  4/23/2019 1501 by Denise Christian RN  Outcome: Ongoing  4/23/2019 0432 by Amaury Parada RN  Outcome: Ongoing

## 2019-04-23 NOTE — PROGRESS NOTES
Occupational [facility]   Occupational Therapy Evaluation  Date: 19  Patient Name: Libby Issa       Room:   MRN: [id number]  Account: [de-identified]   : 1950  (77 y.o.) Gender: female     Discharge Recommendations:  [address]       Referring Practitioner: Hood Shoemaker MD  Diagnosis: Sepsis due to UTI       Treatment Diagnosis: impaired ADL status, weakness , impaired mobility  Past Medical History:  has a past medical history of Abnormal computed tomography of cervical spine, CVA (cerebral vascular accident) ([address] GERD (gastroesophageal reflux disease), Hypertension, Paraproteinemia, and Weight loss. Past Surgical History:   has a past surgical history that includes pacemaker placement (2011); intubation (2019); and Upper gastrointestinal endoscopy (N/A, 2019). Restrictions  Restrictions/Precautions: Fall Risk, Contact Precautions, Isolation(droplet precautions)  Implants present? : Pacemaker  Other position/activity restrictions: LLE in a long leg cast     Vitals  Temp: 96.9 °F (36.1 °C)  Pulse: 88  Resp: 23  BP: (!) 87/48  Height: 5' (152.4 cm)  Weight: 131 lb 13.4 oz (59.8 kg)  BMI (Calculated): 25.8  Oxygen Therapy  SpO2: 96 %  Pulse Oximeter Device Mode: Continuous  Pulse Oximeter Device Location: Finger  O2 Device: Nasal cannula  FiO2 : 100 %  O2 Flow Rate (L/min): 2 L/min  End Tidal CO2: 21 (%)  Blood Gas  Performed?: Yes  Chele's Test #1: Collateral flow confirmed  Site #1: Right Radial  Site Prepped #1: Yes  Number of Attempts #1: 1  Pressure Held #1: Yes  Complications #1: None  Post-procedure #1: Standard  Specimen Status #1: Point of care  How Tolerated?: Tolerated well  Level of Consciousness: Alert    Subjective  Subjective: Pt states that she is doing ok today. Pt states she normally doesn't use O2. Pt states she dislocated her lt knee about 2 weeks ago. Comments: Pt agreeable to OT evaluation. Overall Orientation Status: Within Functional Limits(Pt oriented to person, place, month, year.)  Vision  Vision: Within Functional Limits  Hearing  Hearing: Within functional limits  Social/Functional History  Lives With: Alone  Type of Home: Apartment  Home Layout: One level  Home Access: Level entry  Bathroom Shower/Tub: Walk-in shower  Bathroom Toilet: Standard  Bathroom Equipment: Shower chair, Grab bars around toilet, Grab bars in [address]: Rolling walker, Sobia Merrill, Wheelchair-electric  Receives Help From: Home health  ADL Assistance: Needs assistance  Homemaking Assistance: Needs assistance  Ambulation Assistance: Needs assistance  Transfer Assistance: Needs assistance  Active : No  Patient's  Info: Does not drive  Occupation: Retired  Type of occupation: Pt states she was a . Leisure & Hobbies: sitting outside. Additional Comments: Per chart pt was at [facility]. Objective              ADL  Feeding: Setup, Minimal assistance  Grooming: Maximum assistance  UE Bathing: Maximum assistance, Dependent/Total  LE Bathing: Dependent/Total  UE Dressing: Dependent/Total, Maximum assistance  LE Dressing: Dependent/Total  Toileting: Dependent/Total(pt has dyer catheter)    UE Function  Hand Dominance  Hand Dominance: Right        LUE Strength  LUE Strength Comment: BNL     LUE Tone: Normotonic  LUE PROM (degrees)  LUE General PROM: PROM lt wrist - wfls. Tightness felt in lt shoulder,elbow, hand w PROM. L Shoulder Flex  0-180: 90  L Shoulder ABduction 0-180: 90  L Elbow Flexion 0-145: 90  L Elbow Extension 145-0: wfl  LUE AROM (degrees)  LUE General AROM: BNLs. Pt only moved her hand and elbow a very little.   Left Hand PROM (degrees)  Left Hand PROM: WFL  Left Hand AROM (degrees)  Left Hand General AROM: BNLs - little hand movement  RUE Strength  R Shoulder Flex: 3/5  R Shoulder ABduction: 3/5  R Elbow Flex: 3+/5  R Elbow Ext: 3+/5  R Wrist Flex: 3+/5  R Wrist Ext: 3+/5  RUE Strength Comment: moderate rt hand grasp      RUE Tone: Normotonic     RUE AROM (degrees)  RUE General AROM: Elbow,wrist,forearm - wfls  R Shoulder Flexion 0-180: 145  R Shoulder ABduction 0-180: 145     Right Hand AROM (degrees)  Right Hand AROM: WFL    Fine Motor Skills  Coordination  Movements Are Fluid And Coordinated: No  Coordination and Movement description: Gross motor impairments, Fine motor impairments, Left UE, Decreased speed          LUE Edema - Circumference (cm)  LUE Edema Present?: Yes(lt elbow,forearm, hand swelling)                Mobility          Balance  Sitting Balance: (TBA)  Standing Balance: (TBA -deferred pt tired)        Bed mobility  Comment: TBA. Pt states she gets a lot of help to move in bed. Pt didn't want to try rolling d/t being tired. Transfers  Sit to stand: (TBA when pt appropriate)  Stand to sit: (TBA)      Assessment  Performance deficits / Impairments: Decreased functional mobility , Decreased ADL status, Decreased ROM, Decreased strength, Decreased endurance, Decreased coordination  Assessment: Pt will benefit from OT to improve pt's participation and independence w ADLs, improve bilateral UE strength & UB endurance to assist w selfcare. Treatment Diagnosis: impaired ADL status, weakness , impaired mobility  Prognosis: Good  Decision Making: Medium Complexity  History: Pt admitted w abdominal pain for 3 days. 4-16-19 EGD  Exam: 6 performance deficits  Assistance / Modification: Pt needs extensive assist w selfcare activities. Patient Education: OT POC  Barriers to Learning: none  REQUIRES OT FOLLOW UP: Yes  Treatment Initiated : PROM lt UE. Discharge Recommendations: Subacute/Skilled Nursing Facility  Activity Tolerance: Patient limited by fatigue  Activity Tolerance: Pain w PROM lt UE.          Functional Outcome Measures  AM-PAC Daily Activity Inpatient   How much help for putting on and taking off regular lower body clothing?: Total  How much help for Bathing?: Total  How much help for Toileting?: Total(dyer catheter)  How much help for putting on and taking off regular upper body clothing?: Total(max/dependent)  How much help for taking care of personal grooming?: A Lot  How much help for eating meals?: A Little  AM-PAC Inpatient Daily Activity Raw Score: 9  AM-PAC Inpatient ADL T-Scale Score : 25.33  ADL Inpatient CMS 0-100% Score: 79.59  ADL Inpatient CMS G-Code Modifier : CL       Goals  Patient Goals   Patient goals :  To do as much for herself as she can  Short term goals  Time Frame for Short term goals: by discharge  Short term goal 1: pt will participate in 15-20 minutes bilateral UE therapeutic exercises to improve rt UE strength,increase lt UE (rom,coordination), &decrease swelling)  Short term goal 2: pt will demonstrates increase indpendence w ADLS (needing min assist w grooming, UB bathing and dressing) using pacing/ECWS technique  Short term goal 3: Improve bed mobility for selfcare activities  Short term goal 4: Educate pt in ECWS techniques for selfcare  Short term goal 5: Assess safety w transfers  & progress to lower body ADLS when pt is appropriate    Plan  Safety Devices  Safety Devices in place: Yes  Type of devices: Left in bed, Call light within reach, Patient at risk for falls     Plan  Times per week: 5  Times per day: Daily  Current Treatment Recommendations: ROM, Strengthening, Patient/Caregiver Education & Training, Self-Care / ADL, Safety Education & Training, Functional Mobility Training, Equipment Evaluation, Education, & procurement(coordination)  Plan Comment: continue Ot       OT Individual Minutes  Time In: 0912  Time Out: 1835  Minutes: 22    Electronically signed by Jermain Jane OT on 4/23/19 at 1:06 PM

## 2019-04-23 NOTE — PROGRESS NOTES
[address] [facility].    PROGRESS NOTE             4/23/2019    8:50 AM    Name:   Nimo Melvin  MRN:     [id number]     Acct:      [de-identified]   Room:   2002/2002-01  IP Day:  12  Admit Date:  4/11/2019  2:48 PM    PCP:  Yvette Stallings DO  Code Status:  Full Code    Subjective: Interval History Status: improved. Patient seen and examined at bedside in the ICU. Afebrile, VSS. Patient appears better today visually. Alert and awake. WBC trending down. Afebrile. SBP controlled. Pain continues to be improving. Cholecystostomy tube placed by IR yesterday successfully without complications. Repeat Blood cx -ve @ 5 days      Brief History:     Per EMR:     The patient is a 77 y.o.  Female who presents withAbdominal Pain   and she is admitted to the hospital for the management of abdominal distension, nausea, vomiting, and acute cystitis.      Patient presents with chills, nausea, vomiting, abdominal pain (diffuse, but worse in the suprapubic region), abdominal distension, and dysuria of 2-3 days duration. Denies hematemesis. Acknowledges difficulty keeping food down but tolerating fluids. States abdominal pain is a 6/10 on average, and denies trying any medication to alleviate her sx.      Denies recent antibiotic use or steroid use.      ED: Tachycardic 100-114 BP, WBC 47.9 w/neutrophils 88,  UCx from 4/2 + E. Coli > 100,000 w/suceptibility to cipro. Review of Systems:     Review of Systems   Constitutional: Negative for activity change, appetite change, chills, diaphoresis, fatigue and fever. HENT: Negative for sore throat. Eyes: Negative for discharge and redness. Respiratory: Negative for apnea, cough, chest tightness, shortness of breath, wheezing and stridor. Cardiovascular: Negative for leg swelling. Chest pain: Left-sided.    Gastrointestinal: Negative for abdominal distention, abdominal pain, within the acetabulum. No focal soft tissue abnormality is seen. Left knee, two views: The knee is flexed. No acute osseous abnormality. No joint effusion. Joint spaces are not optimally profiled but no significant arthritic changes are apparent. Left tib-fib, three views: There is evidence of a remote healed distal tibia fracture. No acute fracture or dislocation is seen. No focal soft tissue abnormality. No acute osseous abnormality of the left femur. No acute osseous abnormality of the left knee. No acute osseous abnormality of the left tib-fib. Healed remote distal tibia fracture. Marked osteopenia, likely due to disuse. Xr Knee Left (1-2 Views)    Result Date: 3/27/2019  EXAMINATION: 4 XRAY VIEWS OF THE LEFT FEMUR; 2 XRAY VIEWS OF THE LEFT KNEE; 3 XRAY VIEWS OF THE LEFT TIBIA AND FIBULA 3/27/2019 7:00 pm COMPARISON: Left hip 11/14/2015. HISTORY: ORDERING SYSTEM PROVIDED HISTORY: pain TECHNOLOGIST PROVIDED HISTORY: pain Ordering Physician Provided Reason for Exam: pt twisted wrong, lt knee pain, unable to straighten knee. Acuity: Acute Type of Exam: Initial FINDINGS: Left femur, four views: The bones are diffusely osteopenic. No acute fracture deformity. The hip joint is maintained. The femoral head projects within the acetabulum. No focal soft tissue abnormality is seen. Left knee, two views: The knee is flexed. No acute osseous abnormality. No joint effusion. Joint spaces are not optimally profiled but no significant arthritic changes are apparent. Left tib-fib, three views: There is evidence of a remote healed distal tibia fracture. No acute fracture or dislocation is seen. No focal soft tissue abnormality. No acute osseous abnormality of the left femur. No acute osseous abnormality of the left knee. No acute osseous abnormality of the left tib-fib. Healed remote distal tibia fracture. Marked osteopenia, likely due to disuse.      Xr Knee Left (3 Views)    Result Date: 3/30/2019  EXAMINATION: 3 XRAY VIEWS OF THE LEFT KNEE 3/30/2019 10:58 am COMPARISON: March 27, 2018 HISTORY: ORDERING SYSTEM PROVIDED HISTORY: F/u L knee pain after cast TECHNOLOGIST PROVIDED HISTORY: AP, oblique, lateral F/u L knee pain after cast FINDINGS: Marked osteopenia. Interval casting, which degrades fine osseous detail question cortical offset in the lateral femoral metaphysis. No malalignment identified. No significant joint effusion. Interval casting. Question nondisplaced fracture in the lateral femoral metaphysis. Osteopenia. Xr Tibia Fibula Left (2 Views)    Result Date: 3/27/2019  EXAMINATION: 4 XRAY VIEWS OF THE LEFT FEMUR; 2 XRAY VIEWS OF THE LEFT KNEE; 3 XRAY VIEWS OF THE LEFT TIBIA AND FIBULA 3/27/2019 7:00 pm COMPARISON: Left hip 11/14/2015. HISTORY: ORDERING SYSTEM PROVIDED HISTORY: pain TECHNOLOGIST PROVIDED HISTORY: pain Ordering Physician Provided Reason for Exam: pt twisted wrong, lt knee pain, unable to straighten knee. Acuity: Acute Type of Exam: Initial FINDINGS: Left femur, four views: The bones are diffusely osteopenic. No acute fracture deformity. The hip joint is maintained. The femoral head projects within the acetabulum. No focal soft tissue abnormality is seen. Left knee, two views: The knee is flexed. No acute osseous abnormality. No joint effusion. Joint spaces are not optimally profiled but no significant arthritic changes are apparent. Left tib-fib, three views: There is evidence of a remote healed distal tibia fracture. No acute fracture or dislocation is seen. No focal soft tissue abnormality. No acute osseous abnormality of the left femur. No acute osseous abnormality of the left knee. No acute osseous abnormality of the left tib-fib. Healed remote distal tibia fracture. Marked osteopenia, likely due to disuse.      Ct Abdomen Pelvis W Iv Contrast    Result Date: 4/11/2019  EXAMINATION: CT OF THE ABDOMEN AND PELVIS WITH CONTRAST 4/11/2019 5:14 pm TECHNIQUE: CT of the abdomen and pelvis was performed with the administration of intravenous contrast. Multiplanar reformatted images are provided for review. Dose modulation, iterative reconstruction, and/or weight based adjustment of the mA/kV was utilized to reduce the radiation dose to as low as reasonably achievable. COMPARISON: None. HISTORY: ORDERING SYSTEM PROVIDED HISTORY: Abdominal pain TECHNOLOGIST PROVIDED HISTORY: IV Only Contrast Ordering Physician Provided Reason for Exam: patient c/o abd pain for an hour FINDINGS: Lower Chest: Trace pleural fluid bilaterally is noted. Indwelling cardiac pacemaker is present. Heart size is normal.  Coronary artery calcifications are evident. The esophagus is significantly dilated and fluid-filled. No paraesophageal adenopathy is evident. Organs: The spleen, pancreas, and adrenals are unremarkable. The liver contains hypodensities, likely cysts. The gallbladder is distended and contains a solitary gallstone. The kidneys excrete contrast bilaterally. Extrarenal collecting systems are noted. The ureters are mildly dilated down to the urinary bladder without evidence of intraluminal or ureteral obstructing calculi. GI/Bowel: Marked distention of the stomach is noted; this continues to the pylorus with appearance suggesting partial gastric outlet obstruction. Fluid is present in some small bowel loops and colon distal to the stomach. No small bowel obstruction. Pelvis: Marked distention of the urinary bladder is noted. 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 Distended ureters may be related to reflux or infection. No free pelvic fluid, pelvic or inguinal adenopathy is noted. Peritoneum/Retroperitoneum: No aortic aneurysm. Mild to moderate plaque is noted in the infrarenal abdominal aorta and both proximal iliac arteries. Shotty lymph nodes are present around the aorta in the upper abdomen.   No mesenteric adenopathy is noted. Bones/Soft Tissues: Degenerative changes are present in the hips and lower lumbar facets. Estimated biologic radiation dose for this procedure:258.77 mGy/cm2.     1. Dilatation of the thoracic esophagus filled with fluid. No obstructive process is noted. No adjacent enlarged lymph nodes. 2. Trace pleural fluid. 3. Marked distention of the stomach. This appears to extend to the pylorus. Partial gastric outlet obstruction is not excluded. 4. Bilateral dilated ureters without obstructing calculi. Urinary bladder is markedly distended with bladder wall air suggesting emphysematous cystitis. Dilatation of the ureters may be related to reflux or infection. 5. Atherosclerotic disease. 6. Other findings as above. Critical results were called by Dr. Madeline Case MD to [address] on 4/11/2019 at 17:37. Xr Chest Portable    Result Date: 4/12/2019  EXAMINATION: SINGLE XRAY VIEW OF THE CHEST 4/12/2019 5:26 am COMPARISON: November 14, 2015. HISTORY: ORDERING SYSTEM PROVIDED HISTORY: line placement TECHNOLOGIST PROVIDED HISTORY: line placement Ordering Physician Provided Reason for Exam: New right side line placement. Acuity: Acute Type of Exam: Initial Additional signs and symptoms: New right side line placement. FINDINGS: Stable left pectoral trans venous cardiac pacer device. New right IJ central venous catheter with tip near the superior atrial caval junction. Normal lung volume. No new consolidation. Curvilinear radiopacity projecting over the right upper lobe likely represents artifact from a skin fold. No pleural effusion or pneumothorax. Stable cardiomediastinal silhouette and great vessels with redemonstration of atherosclerotic thoracic aorta. New right IJ central venous catheter with tip near the superior atrial caval junction. No pneumothorax. No new consolidation. Physical Examination:        Physical Exam   Constitutional: She is oriented to person, place, and time.  She appears well-developed. She appears cachectic. She appears ill. No distress. Intubated   HENT:   Head: Normocephalic and atraumatic. Eyes: Pupils are equal, round, and reactive to light. Conjunctivae and EOM are normal.   Neck: Normal range of motion. Right IJ central line in place. Site clean and dry. Cardiovascular: Normal rate, regular rhythm, normal heart sounds and intact distal pulses. Exam reveals no gallop and no friction rub. No murmur heard. Pulmonary/Chest: Effort normal. No stridor. No respiratory distress. She has no wheezes. She has no rales. Intubated and mechanically ventilated. Abdominal: Soft. Bowel sounds are normal. She exhibits no mass. There is no tenderness (Decreased tenderness to light palpation. Improvement from prior. ). There is no rebound and no guarding. Cholecystostomy tube in place. Musculoskeletal: Normal range of motion. She exhibits no edema (Anasarca; 3+ b/l pitting edema in upper and lower ext. ). Left knee wrapped in dressing. Posterior bruising noted. Neurological: She is alert and oriented to person, place, and time. Skin: Skin is warm and dry. Capillary refill takes less than 2 seconds. She is not diaphoretic. No pallor. Nursing note and vitals reviewed. 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 Coli Emphysematous Cystitis + MRSA PNA   WBC 27.5-->31.8-->27.2-->17.9   Levaquin, flagyl   CXR multifocal airspace disease, left basilar consolidation/effusion   ECHO 4/12: LVEF 45%, RVSP 36 mmHg   Urology consult, appreciate recs --> cont cath until out of ICU and  stable   Critical care consult, appreciate recs   Gen Surg, right IJ central line placed on 4/12   ID consult, appreciate recs   Digoxin 125 mcg - one time dose   Amiodarone gtt to keep HR <100   Fluid overloaded - lasix 40 qD per pulm    Acute Cholecystitis   U/S suggest acute ashley   HIDA did not visualze GB   WBC 27.5-->31.8-->27.2-->17.9   GS consulted - appreciate recommendations   Patient not medically cleared at this time for the OR, unless  emergently needs    Surgical intervention - cholecystomy tube placed 4/22/19    GI Malignancy ruled out by EGD, likely presenting w/Gastroparesis   Hx of BMs during this admission   Persistent clinical abdominal distension and pain   EGD: esophagitis, hiatal hernia, solid food in 75% stomach -->  possible gastroparesis   Pepcid switched to Protonix, per GI recs   TPN   Multiple tumor markers mildly elevated --> will wait to consider  heme/onc consult, markers likely elevated 2/2 sepsis   Reglan started on 4/17 --> Daily ECG   Considering systemic autonomic dysfunction as overlying diagnosis  (gastroparesis, neurogenic bladder, hypotension/fluctuating BPs)     Anemia, likely 2/2 bleeding   Transfusion 1 U RBC on 4/14   Transfusion 2 U RBC on 4/16    Restarted Lovenox, INR wNL    Hypokalemia   Potassium sliding scale    Hypophosphatemia   Sodium phosphate to correct    Left knee remote distal tibia fx w/osteopenia   Ortho replaced brace    Maintenance   Lovenox   Dulera, Xopenex   Continue home meds trazodone, ASA, Plavix, Norvasc, Effexor, Spiriva     Dispo   PT/OT Eval and treat   Social work for DC planning   OK to transfer to intermediate ICU      Debra Fuchs  4/23/2019  8:50 AM     Electronically signed by Debra Fuchs on 4/23/2019 at 8:50 AM Attestation and add on       I have discussed the care of Suad Valentino , including pertinent history and exam findings,   @td with the resident. I have seen and examined the patient and the key elements of all parts of the encounter have been performed by me . I agree with the assessment, plan and orders as documented by the resident. Principal Problem:    Sepsis due to urinary tract infection (HCC)  Active Problems:    Cystitis    Emphysematous cystitis    Septic shock (HCC)    Leukemoid reaction    Aspiration pneumonia of right lower lobe due to vomit (HCC)    MRSA carrier    Urinary retention    Abdominal distention    Elevated CEA    Elevated CA 19-9 level    Cholecystitis  Resolved Problems:    * No resolved hospital problems. *        Overall  course ;                Symptoms or ailment  course ; Slow improvement over time. [facility] -----   Ventilatory  status  . [facility] NUTRITION:     [] NPO [] Tube Feeding  [] TPN  [x] PO                    Diet: PN-Adult 2-in-1 Central Line (Standard)  DIET CLEAR LIQUID;  Dietary Nutrition Supplements: Clear Liquid Oral Supplement  PN-Adult 2-in-1 Central Line (Standard)    . ........... Fluid , electrolyte therapy  ;  ....... [facility] Continuous Infusions:      PN-Adult 2-in-1 [employer] (Standard)      PN-Adult 2-in-1 Central Line (Standard) 65 mL/hr at 04/22/19 1747    Amiodarone 0.5 mg/min (04/23/19 0628)    norepinephrine 1 mcg/min (04/23/19 1314)    dextrose     . [facility]   Drug therapy ;  Scheduled Meds:      metoclopramide  10 mg Intravenous Q6H    methylPREDNISolone  20 mg Intravenous Q12H    diltiazem  30 mg Oral 4 times per day    fat emulsion  100 mL Intravenous Daily    furosemide  40 mg Intravenous Daily    metroNIDAZOLE  500 mg Intravenous Q8H    magnesium sulfate  1 g Intravenous Once    pantoprazole  40 mg Intravenous Daily    And    sodium chloride (PF)  10

## 2019-04-23 NOTE — PLAN OF CARE
Nutrition Problem: Inadequate oral intake  Intervention: Food and/or Nutrient Delivery: Continue current diet, Start ONS, Modify current Parenteral Nutrition  Nutritional Goals: PN to meet greater 90% of estimated nutrition needs

## 2019-04-23 NOTE — PROGRESS NOTES
General Surgery Progress Note            PATIENT NAME: Angelika Jay     TODAY'S DATE: 4/23/2019, 6:36 PM    SUBJECTIVE/OBJECTIVE:      VITALS:  BP (!) 95/44   Pulse 114   Temp 97.2 °F (36.2 °C) (Axillary)   Resp 21   Ht 5' (1.524 m)   Wt 131 lb 13.4 oz (59.8 kg)   SpO2 94%   BMI 25.75 kg/m²      Patient seen and examined  Patient vomited this morning  Awake. Alert and oriented ×3. Nontoxic-appearing  Slightly tachycardic  Lungs diminished  Abdomen soft. Nondistended. Tender to palpation although not as intense. Positive flatus.   Positive bowel movement  Cholecystostomy tube output is bilious     INTAKE/OUTPUT:      Intake/Output Summary (Last 24 hours) at 4/23/2019 1836  Last data filed at 4/23/2019 1611  Gross per 24 hour   Intake --   Output 3015 ml   Net -3015 ml       CBC with Differential:    Lab Results   Component Value Date    WBC 17.9 04/23/2019    RBC 3.68 04/23/2019    RBC 3.66 05/23/2012    HGB 10.9 04/23/2019    HCT 33.6 04/23/2019     04/23/2019     05/23/2012    MCV 91.3 04/23/2019    MCH 29.6 04/23/2019    MCHC 32.4 04/23/2019    RDW 15.2 04/23/2019    METASPCT 2 04/11/2019    LYMPHOPCT 4 04/11/2019    MONOPCT 2 04/11/2019    BASOPCT 0 04/11/2019    MONOSABS 0.96 04/11/2019    LYMPHSABS 1.92 04/11/2019    EOSABS 0.00 04/11/2019    BASOSABS 0.00 04/11/2019    DIFFTYPE NOT REPORTED 04/11/2019     CMP:    Lab Results   Component Value Date     04/23/2019    K 4.1 04/23/2019    CL 98 04/23/2019    CO2 27 04/23/2019    BUN 27 04/23/2019    CREATININE <0.40 04/23/2019    GFRAA CANNOT BE CALCULATED 04/23/2019    LABGLOM CANNOT BE CALCULATED 04/23/2019    GLUCOSE 131 04/23/2019    GLUCOSE 87 05/21/2012    PROT 4.2 04/23/2019    LABALBU 1.8 04/23/2019    LABALBU 4.6 05/17/2012    CALCIUM 7.2 04/23/2019    BILITOT 0.30 04/23/2019    ALKPHOS 87 04/23/2019    AST 27 04/23/2019    ALT 24 04/23/2019           ASSESSMENT/PLAN:    Principal Problem:    Sepsis due to urinary tract infection ([facility] [address]  Active Problems:    Cystitis    Emphysematous cystitis    Septic shock (HCC)    Leukemoid reaction    Aspiration pneumonia of right lower lobe due to vomit (HCC)    MRSA carrier    Urinary retention    Abdominal distention    Elevated CEA    Elevated CA 19-9 level    Cholecystitis  Resolved Problems:    * No resolved hospital problems.  *    Acute cholecystitis  Status post cholecystostomy tube placement  Continue antibiotics  Will hold liquids for the rest of today and overnight and reassess in the morning

## 2019-04-23 NOTE — PROGRESS NOTES
Dr. Leora De Leon notified regarding this patient's episode of vomiting a small amount of coffee ground emesis. This patient also had loose stools. Dr. Bebe Rodas notified as well.     Electronically signed by Cherylene Lobo, RN on 4/23/2019 at 2:42 PM

## 2019-04-23 NOTE — PROGRESS NOTES
Review     CBC  Recent Labs     19  0502 19  0400   WBC 27.2* 21.2* 17.9*   HGB 12.0 11.7* 10.9*   HCT 37.3 35.7* 33.6*   MCV 93.0 91.9 91.3    152 138*       BMP  Recent Labs     198 19  0502 19  0400   * 136 135   K 3.5* 4.9 4.1   CL 95* 97* 98   CO2 28 29 27   BUN 15 22 27*   CREATININE <0.40* <0.40* <0.40*   GLUCOSE 152* 208* 131*   CALCIUM 7.3* 7.5* 7.2*       LFTS  Recent Labs     19  0502 19  0400   ALKPHOS 111* 104 87   ALT 27 24 24   AST 41* 35* 27   PROT 4.6* 4.4* 4.2*   BILITOT 0.47 0.43 0.30   LABALBU 2.2* 1.8* 1.8*       AMYLASE/LIPASE/AMMONIA  Recent Labs     198 19  0502   AMYLASE 258* 246*   LIPASE 68*  --        PT/INR  Recent Labs     19  0502 19  0400   PROTIME 14.5 15.1* 16.5*   INR 1.1 1.2 1.3     TECHNIQUE:19   Informed consent was obtained after a detailed explanation of the procedure   including risks, benefits, and alternatives.  Universal protocol was   followed. A suitable skin site was prepped and draped in sterile fashion   following ultrasound localization. An 18 gauge needle was advanced under   ultrasound guidance into the gallbladder via transhepatic route and a 0.035   guidewire was used to place a 8 [place] cholecystostomy tube under   fluoroscopic guidance.  The catheter was sutured to the skin and the patient   tolerated the procedure well.  Thick bilious material was sent for laboratory   analysis.  The catheter was attached to gravity drainage.       FINDINGS:   Ultrasound image demonstrates distended gallbladder.  Bilious fluid was sent   for culture.           Impression   Successful placement of transhepatic 8 [place] percutaneous cholecystostomy   tube. Results for Bharati Jara (MRN [id number]) as of 2019 09:15    Ref.  Range 4/15/2019 11:10    Latest Ref Range: <38 U/mL 62 (H)   CA 19-9 Latest Ref Range: 0 - 35 U/mL 49 (H) esophagus.  No enteric contrast is seen distal to the pylorus suggesting   delayed gastric emptying in the setting of ileus. 2. New moderate layering bilateral pleural effusions with bilateral lower   lobe atelectasis. 3. Small amount of nonspecific free fluid in the pelvic cavity. 4. Anasarca. 5. Cholelithiasis.        FINDINGS:GB US 4/19/19   Pancreas is not well visualized.       No liver lesion.       Gallbladder wall thickening.  Gallbladder sludge.  Cholelithiasis. Pericholecystic fluid.       Common bile duct measures at the upper limits of normal at 7 mm.           Impression   Findings suggesting acute cholecystitis.      ESOPHAGOGASTRODUODENOSCOPY   ( EGD )  DATE OF PROCEDURE: 4/16/2019      Wilber Curtis MD        POSTOPERATIVE Ninfa Anand has esophagitis.     Has a large amount of retained solid food in the upper part of the stomach and fundus.  Antrum is clear.  Pylorus wide open and did not show signs of stenosis.     OPERATION: Upper GI endoscopy          Findings:     Retropharyngeal area was grossly normal appearing     Esophagus: abnormal: Has a grade 2 esophagitis.  Appears peptic esophagitis.  Has a small sliding hiatal hernia.     Stomach:  Fundus of the stomach and body of the stomach.  With solid food.  75% of the lumen is occupied with solid food.     Antrum: No retained food.  Able to advance the scope through the pylorus without difficulty.  No pyloric stenosis seen.  No signs of outlet obstruction.     Duodenum:     Descending: normal    Bulb: normal  Minimal liquid present in the 2nd part of the duodenum.     ASSESSMENT/PLAN:  1. Abdominal pain' distention; improved s/p cholecystectomy tube placement yesterday  -morning amylase pending  -clear diet ok  -abx per ID    2. Ileus resolving    3.  Anemia stable, no overt bleeding s/p EGD  -continue to trend h/h  -continue ppi      GI signing off      This plan was formulated in collaboration with  Connor.     Electronically signed by: AGAPITO Daniel CNP on 4/23/2019 at 7:40 AM

## 2019-04-23 NOTE — CARE COORDINATION
ONGOING DISCHARGE PLAN:    Plan remains for LSW to continue to follow for return to SNF, [facility]. Pt. Had Cholecystostomy tube place in IR yesterday, Surgery continues to follow. Pt. Remains on IV Flagyl,Levaquin & TPN as well as cl liquids. ID/Pulmonary on board. Will continue to follow for additional discharge needs.     Electronically signed by Sobeida Sim RN on 4/23/2019 at 3:20 PM

## 2019-04-23 NOTE — PROGRESS NOTES
Karly Ramírez notified regarding coffee ground emesis.     Electronically signed by Severo Minion, RN on 4/23/2019 at 2:38 PM

## 2019-04-23 NOTE — PROGRESS NOTES
Infectious disease Consult Note      Patient: Elo Worley  : 1950  Acct#:  [id number]     Date:  2019    Assessment:     Cholecystitis status post cholecystectomy tube yesterday. Sepsis due to [facility] Emphysematous cystitis     Aspiration pneumonia of right lower lobe. Septic shock     Yeast growth on sputum culture suspect contamination     Leukocytosis      AMYLASE elevated 259 possible pancreatitis . Acute hypoxic resp failure     MRSA carrier    Urinary retention ,Tran cath in place     Anemia      PCN allergy                 Recommendations:   D/C Colace   Continue  IV Levaquin and Flagyl. Follow cultures and adjust antibiotics if needed. Follow CBC , renal function closely . Continue supportive care . Subjective:       History of Present Illness  Patient is a 77 y.o.  female admitted with Sepsis due to urinary tract infection   who is seen in consult for the same . She presented w dysuria and lower abd pain for around a week associated with vomiting ,was found hypotensive with leukocytosis . CT suggested emphysematous cystitis,possible gastric outlet obstruction. CXR showed a right lower infiltrate. High CA-19-9,CEA  Allergy to [facility] w SOB   Urine culture  grew ESCHERICHIA COLI resistant to Ampicillin ,sensitive to all others tested ABXS . Blood cultures negative . Mycoplasma IGM 0.97   YEAST MODERATE GROWTH on sputum culture   S/P EGD   with reported esophagitis. Amylase elevated. GB US Findings suggesting acute cholecystitis. HIIDA showed absent gallbladder filling. Interval history :  She is complaining of upper abdominal  pain and nausea ,one episode of coffee ground emesis . NG tube out . Loose stool on colace . No reported fever . Cholecystectomy tube was placed by IR yesterday with reported bilious fluid was sent for cultures . She restarted on Levophed overnight. On amiodarone drip .   On TPN  100 mL of bilious output overnight multivitamin-minerals (THERAGRAN-M) tablet Take 1 tablet by mouth daily.  omeprazole (PRILOSEC) 20 MG capsule Take 20 mg by mouth daily.  venlafaxine (EFFEXOR) 37.5 MG tablet Take 37.5 mg by mouth 3 times daily.  Misc. Devices [facility]) [address] wheelchair with left fupper extremity support  Dx: stroke with left hemiparesis. 1 each 0           Allergies  Allergies   Allergen Reactions    Penicillins Shortness Of Breath and Rash    Amoxicillin         Social   Social History     Tobacco Use    Smoking status: Current Some Day Smoker     Packs/day: 1.00     Years: 30.00     Pack years: 30.00    Smokeless tobacco: Never Used   Substance Use Topics    Alcohol use: Not Currently     Alcohol/week: 16.8 oz     Types: 28 Glasses of wine per week                History reviewed. No pertinent family history. Review of Systems  Other than above 12 systems reviewed negative . Tolerating antibiotics. Physical Exam  BP (!) 87/48   Pulse 88   Temp 96.9 °F (36.1 °C) (Axillary)   Resp 23   Ht 5' (1.524 m)   Wt 131 lb 13.4 oz (59.8 kg)   SpO2 96%   BMI 25.75 kg/m²           General Appearance: alert ,NAD . Skin: warm and dry, no rash or erythema  Head: normocephalic and atraumatic  Eyes: pupils equal, round  Neck: neck supple and non tender   Pulmonary/Chest: coarse to auscultation bilaterally- no wheezes, rales or rhonchi  Cardiovascular: normal rate, regular rhythm, normal S1 and S2, no murmurs.   Abdomen: soft,mild upper abdomen tenderness  -distended, normal bowel sounds, no masses or organomegaly  Extremities: no cyanosis, clubbing   Edema   Right IJ line     Data Review:    Recent Labs     04/21/19  0418 04/22/19  0502 04/23/19  0400   WBC 27.2* 21.2* 17.9*   HGB 12.0 11.7* 10.9*   HCT 37.3 35.7* 33.6*   MCV 93.0 91.9 91.3    152 138*     Recent Labs     04/21/19  0418 04/22/19  0502 04/23/19  0400   * 136 135   K 3.5* 4.9 4.1   CL 95* 97* 98   CO2 28 29 27   PHOS --  4.3 4.0   BUN 15 22 27*   CREATININE <0.40* <0.40* <0.40*     Recent Labs     04/21/19 0418 04/22/19  0502 04/23/19  0400   AST 41* 35* 27   ALT 27 24 24   BILITOT 0.47 0.43 0.30   ALKPHOS 111* 104 87     Recent Labs     04/21/19 0418 04/22/19  0502 04/23/19  0400   LIPASE 68*  --   --    AMYLASE 258* 246* 215*     Recent Labs     04/21/19 0418 04/22/19  0502 04/23/19  0400   PROTIME 14.5 15.1* 16.5*   INR 1.1 1.2 1.3       Imaging Studies:                           All appropriate imaging studies and reports reviewed: Yes       Ct Abdomen Pelvis Wo Contrast Additional Contrast? Oral    Result Date: 4/14/2019  EXAMINATION: CT OF THE ABDOMEN AND PELVIS WITHOUT CONTRAST 4/14/2019 7:34 pm TECHNIQUE: CT of the abdomen and pelvis was performed without the administration of intravenous contrast. Multiplanar reformatted images are provided for review. Dose modulation, iterative reconstruction, and/or weight based adjustment of the mA/kV was utilized to reduce the radiation dose to as low as reasonably achievable. COMPARISON: 04/11/2019 HISTORY: ORDERING SYSTEM PROVIDED HISTORY: ABDOMINAL PAIN TECHNOLOGIST PROVIDED HISTORY: Water soluble contrast only please Ordering Physician Provided Reason for Exam: Abdominal pain - Vented patient. Contrast given via nurse through NG tube. Acuity: Unknown Type of Exam: Unknown Relevant Medical/Surgical History: Hx - Sepsis due to urinary tract infection. FINDINGS: Lower Chest: New moderate layering bilateral pleural effusions with bilateral lower lobe atelectasis. Organs: Limited evaluation due lack of intravenous contrast.  Cholelithiasis redemonstrated. No gallbladder wall thickening or biliary ductal dilatation. Scattered tiny hypodense lesions in the liver are too small to characterize but statistically represent benign cysts or hemangiomas and appear unchanged. The pancreas, spleen, adrenal glands, and kidneys are unremarkable.   There is no hydronephrosis or urinary tract calculus. GI/Bowel: The stomach is distended. Enteric tube is in place. No contrast is seen distal to the pylorus and there is contrast reflux into the distal esophagus. There is no evidence of bowel obstruction. The appendix is not definitely visualized. No focal pericecal inflammatory changes are evident. Pelvis: The urinary bladder is decompressed by Tran catheter. No pelvic mass is seen. Peritoneum/Retroperitoneum: Small amount of free fluid in the pelvic cavity. No free air or focal fluid collection. No abnormal lymph node. Normal abdominal aortic caliber. Moderate calcific atherosclerosis. Bones/Soft Tissues: No acute osseous abnormality. Diffuse anasarca. Moderate degenerative changes in the lumbar spine. 1. Distended, contrast filled stomach with reflux of contrast into the esophagus. No enteric contrast is seen distal to the pylorus suggesting delayed gastric emptying in the setting of ileus. 2. New moderate layering bilateral pleural effusions with bilateral lower lobe atelectasis. 3. Small amount of nonspecific free fluid in the pelvic cavity. 4. Anasarca. 5. Cholelithiasis. Xr Chest (single View Frontal)    Result Date: 4/13/2019  EXAMINATION: SINGLE XRAY VIEW OF THE CHEST 4/13/2019 7:18 am COMPARISON: April 12, 2019 HISTORY: ORDERING SYSTEM PROVIDED HISTORY: dyspnea TECHNOLOGIST PROVIDED HISTORY: dyspnea Ordering Physician Provided Reason for Exam: dyspnea Acuity: Acute Type of Exam: Subsequent/Follow-up Additional signs and symptoms: dyspnea FINDINGS: A right IJ catheter is seen with its tip terminating at the superior cavoatrial junction. The left chest wall pacemaker and leads are stable. The cardiomediastinal silhouette is stable. There is interval increased opacity at the right lung base, may be related to atelectasis versus pneumonia. There is small atelectasis and mild pleural effusion at the left lung base. There is no pneumothorax. There is no acute osseous abnormality. diffusely osteopenic. No acute fracture deformity. The hip joint is maintained. The femoral head projects within the acetabulum. No focal soft tissue abnormality is seen. Left knee, two views: The knee is flexed. No acute osseous abnormality. No joint effusion. Joint spaces are not optimally profiled but no significant arthritic changes are apparent. Left tib-fib, three views: There is evidence of a remote healed distal tibia fracture. No acute fracture or dislocation is seen. No focal soft tissue abnormality. No acute osseous abnormality of the left femur. No acute osseous abnormality of the left knee. No acute osseous abnormality of the left tib-fib. Healed remote distal tibia fracture. Marked osteopenia, likely due to disuse. Xr Knee Left (3 Views)    Result Date: 3/30/2019  EXAMINATION: 3 XRAY VIEWS OF THE LEFT KNEE 3/30/2019 10:58 am COMPARISON: March 27, 2018 HISTORY: ORDERING SYSTEM PROVIDED HISTORY: F/u L knee pain after cast TECHNOLOGIST PROVIDED HISTORY: AP, oblique, lateral F/u L knee pain after cast FINDINGS: Marked osteopenia. Interval casting, which degrades fine osseous detail question cortical offset in the lateral femoral metaphysis. No malalignment identified. No significant joint effusion. Interval casting. Question nondisplaced fracture in the lateral femoral metaphysis. Osteopenia. Xr Tibia Fibula Left (2 Views)    Result Date: 3/27/2019  EXAMINATION: 4 XRAY VIEWS OF THE LEFT FEMUR; 2 XRAY VIEWS OF THE LEFT KNEE; 3 XRAY VIEWS OF THE LEFT TIBIA AND FIBULA 3/27/2019 7:00 pm COMPARISON: Left hip 11/14/2015. HISTORY: ORDERING SYSTEM PROVIDED HISTORY: pain TECHNOLOGIST PROVIDED HISTORY: pain Ordering Physician Provided Reason for Exam: pt twisted wrong, lt knee pain, unable to straighten knee. Acuity: Acute Type of Exam: Initial FINDINGS: Left femur, four views: The bones are diffusely osteopenic. No acute fracture deformity. The hip joint is maintained.   The femoral distension of the stomach although there is some gas in the upper abdomen and gas and fecal material present in the rectosigmoid. Findings suggest gastric outlet obstruction. Ct Abdomen Pelvis W Iv Contrast    Result Date: 4/11/2019  EXAMINATION: CT OF THE ABDOMEN AND PELVIS WITH CONTRAST 4/11/2019 5:14 pm TECHNIQUE: CT of the abdomen and pelvis was performed with the administration of intravenous contrast. Multiplanar reformatted images are provided for review. Dose modulation, iterative reconstruction, and/or weight based adjustment of the mA/kV was utilized to reduce the radiation dose to as low as reasonably achievable. COMPARISON: None. HISTORY: ORDERING SYSTEM PROVIDED HISTORY: Abdominal pain TECHNOLOGIST PROVIDED HISTORY: IV Only Contrast Ordering Physician Provided Reason for Exam: patient c/o abd pain for an hour FINDINGS: Lower Chest: Trace pleural fluid bilaterally is noted. Indwelling cardiac pacemaker is present. Heart size is normal.  Coronary artery calcifications are evident. The esophagus is significantly dilated and fluid-filled. No paraesophageal adenopathy is evident. Organs: The spleen, pancreas, and adrenals are unremarkable. The liver contains hypodensities, likely cysts. The gallbladder is distended and contains a solitary gallstone. The kidneys excrete contrast bilaterally. Extrarenal collecting systems are noted. The ureters are mildly dilated down to the urinary bladder without evidence of intraluminal or ureteral obstructing calculi. GI/Bowel: Marked distention of the stomach is noted; this continues to the pylorus with appearance suggesting partial gastric outlet obstruction. Fluid is present in some small bowel loops and colon distal to the stomach. No small bowel obstruction. Pelvis: Marked distention of the urinary bladder is noted. 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  Distended ureters may be related to reflux or infection. No free pelvic fluid, pelvic or inguinal adenopathy is noted. Peritoneum/Retroperitoneum: No aortic aneurysm. Mild to moderate plaque is noted in the infrarenal abdominal aorta and both proximal iliac arteries. Shotty lymph nodes are present around the aorta in the upper abdomen. No mesenteric adenopathy is noted. Bones/Soft Tissues: Degenerative changes are present in the hips and lower lumbar facets. Estimated biologic radiation dose for this procedure:258.77 mGy/cm2.     1. Dilatation of the thoracic esophagus filled with fluid. No obstructive process is noted. No adjacent enlarged lymph nodes. 2. Trace pleural fluid. 3. Marked distention of the stomach. This appears to extend to the pylorus. Partial gastric outlet obstruction is not excluded. 4. Bilateral dilated ureters without obstructing calculi. Urinary bladder is markedly distended with bladder wall air suggesting emphysematous cystitis. Dilatation of the ureters may be related to reflux or infection. 5. Atherosclerotic disease. 6. Other findings as above. Critical results were called by Dr. Amberly De La Paz MD to [address] on 4/11/2019 at 17:37. Xr Chest Portable    Result Date: 4/16/2019  EXAMINATION: SINGLE XRAY VIEW OF THE CHEST 4/16/2019 6:54 am COMPARISON: 04/15/2019, 610 hours HISTORY: ORDERING SYSTEM PROVIDED HISTORY: ETT placement TECHNOLOGIST PROVIDED HISTORY: ETT placement Ordering Physician Provided Reason for Exam: on vent Acuity: Acute Type of Exam: Initial 64-year-old female on ventilator; check endotracheal tube placement FINDINGS: Portable AP upright view of the chest. Endotracheal tube distal tip overlying the mid trachea approximately 4.1 cm above the level of the ron. Enteric tube traverses the GE junction with distal tip excluded from the field of view. Left subclavian approach cardiac pacemaker device distal lead tips relatively stable in position.  Right internal jugular approach central venous catheter distal with any questions or concerns.      Miryam Nettles MD

## 2019-04-24 ENCOUNTER — APPOINTMENT (OUTPATIENT)
Dept: GENERAL RADIOLOGY | Age: 69
DRG: 853 | End: 2019-04-24
Payer: COMMERCIAL

## 2019-04-24 LAB
ALBUMIN SERPL-MCNC: 1.7 G/DL (ref 3.5–5.2)
ALBUMIN/GLOBULIN RATIO: ABNORMAL (ref 1–2.5)
ALP BLD-CCNC: 80 U/L (ref 35–104)
ALT SERPL-CCNC: 19 U/L (ref 5–33)
ANION GAP SERPL CALCULATED.3IONS-SCNC: 9 MMOL/L (ref 9–17)
AST SERPL-CCNC: 22 U/L
BILIRUB SERPL-MCNC: 0.33 MG/DL (ref 0.3–1.2)
BUN BLDV-MCNC: 28 MG/DL (ref 8–23)
BUN/CREAT BLD: ABNORMAL (ref 9–20)
C-REACTIVE PROTEIN: 78.1 MG/L (ref 0–5)
CALCIUM SERPL-MCNC: 7.7 MG/DL (ref 8.6–10.4)
CAMPYLOBACTER PCR: NORMAL
CHLORIDE BLD-SCNC: 100 MMOL/L (ref 98–107)
CO2: 27 MMOL/L (ref 20–31)
CREAT SERPL-MCNC: <0.4 MG/DL (ref 0.5–0.9)
CULTURE: NORMAL
CULTURE: NORMAL
DIRECT EXAM: NORMAL
DIRECT EXAM: NORMAL
E COLI ENTEROTOXIGENIC PCR: NORMAL
GFR AFRICAN AMERICAN: ABNORMAL ML/MIN
GFR NON-AFRICAN AMERICAN: ABNORMAL ML/MIN
GFR SERPL CREATININE-BSD FRML MDRD: ABNORMAL ML/MIN/{1.73_M2}
GFR SERPL CREATININE-BSD FRML MDRD: ABNORMAL ML/MIN/{1.73_M2}
GLUCOSE BLD-MCNC: 107 MG/DL (ref 65–105)
GLUCOSE BLD-MCNC: 117 MG/DL (ref 65–105)
GLUCOSE BLD-MCNC: 119 MG/DL (ref 70–99)
GLUCOSE BLD-MCNC: 135 MG/DL (ref 65–105)
HCT VFR BLD CALC: 32.2 % (ref 36–46)
HEMOGLOBIN: 10.5 G/DL (ref 12–16)
INR BLD: 1.3
LACTIC ACID: 1.5 MMOL/L (ref 0.5–2.2)
Lab: NORMAL
MAGNESIUM: 1.9 MG/DL (ref 1.6–2.6)
MCH RBC QN AUTO: 29.6 PG (ref 26–34)
MCHC RBC AUTO-ENTMCNC: 32.4 G/DL (ref 31–37)
MCV RBC AUTO: 91.2 FL (ref 80–100)
NRBC AUTOMATED: ABNORMAL PER 100 WBC
PDW BLD-RTO: 15.2 % (ref 11.5–14.9)
PHOSPHORUS: 3.5 MG/DL (ref 2.6–4.5)
PLATELET # BLD: 141 K/UL (ref 150–450)
PLESIOMONAS SHIGELLOIDES PCR: NORMAL
PMV BLD AUTO: 9.1 FL (ref 6–12)
POTASSIUM SERPL-SCNC: 4.2 MMOL/L (ref 3.7–5.3)
PROCALCITONIN: 0.24 NG/ML
PROTHROMBIN TIME: 16.3 SEC (ref 11.8–14.6)
RBC # BLD: 3.53 M/UL (ref 4–5.2)
SALMONELLA PCR: NORMAL
SEDIMENTATION RATE, ERYTHROCYTE: 40 MM (ref 0–20)
SHIGATOXIN GENE PCR: NORMAL
SHIGELLA SP PCR: NORMAL
SODIUM BLD-SCNC: 136 MMOL/L (ref 135–144)
SPECIMEN DESCRIPTION: NORMAL
TOTAL PROTEIN: 4.4 G/DL (ref 6.4–8.3)
VIBRIO PCR: NORMAL
WBC # BLD: 12.8 K/UL (ref 3.5–11)
YERSINIA ENTEROCOLITICA PCR: NORMAL

## 2019-04-24 PROCEDURE — 36592 COLLECT BLOOD FROM PICC: CPT

## 2019-04-24 PROCEDURE — 36415 COLL VENOUS BLD VENIPUNCTURE: CPT

## 2019-04-24 PROCEDURE — 71045 X-RAY EXAM CHEST 1 VIEW: CPT

## 2019-04-24 PROCEDURE — 83735 ASSAY OF MAGNESIUM: CPT

## 2019-04-24 PROCEDURE — 2500000003 HC RX 250 WO HCPCS: Performed by: INTERNAL MEDICINE

## 2019-04-24 PROCEDURE — 2500000003 HC RX 250 WO HCPCS: Performed by: SURGERY

## 2019-04-24 PROCEDURE — 85610 PROTHROMBIN TIME: CPT

## 2019-04-24 PROCEDURE — 85651 RBC SED RATE NONAUTOMATED: CPT

## 2019-04-24 PROCEDURE — 97110 THERAPEUTIC EXERCISES: CPT

## 2019-04-24 PROCEDURE — 6360000002 HC RX W HCPCS: Performed by: STUDENT IN AN ORGANIZED HEALTH CARE EDUCATION/TRAINING PROGRAM

## 2019-04-24 PROCEDURE — 6360000002 HC RX W HCPCS: Performed by: INTERNAL MEDICINE

## 2019-04-24 PROCEDURE — 2580000003 HC RX 258: Performed by: INTERNAL MEDICINE

## 2019-04-24 PROCEDURE — 80053 COMPREHEN METABOLIC PANEL: CPT

## 2019-04-24 PROCEDURE — 2000000000 HC ICU R&B

## 2019-04-24 PROCEDURE — 99233 SBSQ HOSP IP/OBS HIGH 50: CPT | Performed by: INTERNAL MEDICINE

## 2019-04-24 PROCEDURE — 94640 AIRWAY INHALATION TREATMENT: CPT

## 2019-04-24 PROCEDURE — 6370000000 HC RX 637 (ALT 250 FOR IP): Performed by: INTERNAL MEDICINE

## 2019-04-24 PROCEDURE — 2580000003 HC RX 258: Performed by: STUDENT IN AN ORGANIZED HEALTH CARE EDUCATION/TRAINING PROGRAM

## 2019-04-24 PROCEDURE — 94762 N-INVAS EAR/PLS OXIMTRY CONT: CPT

## 2019-04-24 PROCEDURE — 82947 ASSAY GLUCOSE BLOOD QUANT: CPT

## 2019-04-24 PROCEDURE — 2500000003 HC RX 250 WO HCPCS: Performed by: STUDENT IN AN ORGANIZED HEALTH CARE EDUCATION/TRAINING PROGRAM

## 2019-04-24 PROCEDURE — 2700000000 HC OXYGEN THERAPY PER DAY

## 2019-04-24 PROCEDURE — 83605 ASSAY OF LACTIC ACID: CPT

## 2019-04-24 PROCEDURE — 97530 THERAPEUTIC ACTIVITIES: CPT

## 2019-04-24 PROCEDURE — 85027 COMPLETE CBC AUTOMATED: CPT

## 2019-04-24 PROCEDURE — 6370000000 HC RX 637 (ALT 250 FOR IP): Performed by: STUDENT IN AN ORGANIZED HEALTH CARE EDUCATION/TRAINING PROGRAM

## 2019-04-24 PROCEDURE — 84145 PROCALCITONIN (PCT): CPT

## 2019-04-24 PROCEDURE — 86140 C-REACTIVE PROTEIN: CPT

## 2019-04-24 PROCEDURE — C9113 INJ PANTOPRAZOLE SODIUM, VIA: HCPCS | Performed by: INTERNAL MEDICINE

## 2019-04-24 PROCEDURE — 84100 ASSAY OF PHOSPHORUS: CPT

## 2019-04-24 RX ORDER — 0.9 % SODIUM CHLORIDE 0.9 %
1000 INTRAVENOUS SOLUTION INTRAVENOUS ONCE
Status: COMPLETED | OUTPATIENT
Start: 2019-04-24 | End: 2019-04-24

## 2019-04-24 RX ORDER — SODIUM CHLORIDE 9 MG/ML
INJECTION, SOLUTION INTRAVENOUS CONTINUOUS
Status: DISCONTINUED | OUTPATIENT
Start: 2019-04-24 | End: 2019-05-08

## 2019-04-24 RX ORDER — METHYLPREDNISOLONE SODIUM SUCCINATE 40 MG/ML
20 INJECTION, POWDER, LYOPHILIZED, FOR SOLUTION INTRAMUSCULAR; INTRAVENOUS DAILY
Status: DISCONTINUED | OUTPATIENT
Start: 2019-04-25 | End: 2019-04-25

## 2019-04-24 RX ADMIN — LEVALBUTEROL 1.25 MG: 1.25 SOLUTION, CONCENTRATE RESPIRATORY (INHALATION) at 08:15

## 2019-04-24 RX ADMIN — METOCLOPRAMIDE 10 MG: 5 INJECTION, SOLUTION INTRAMUSCULAR; INTRAVENOUS at 04:50

## 2019-04-24 RX ADMIN — LEVALBUTEROL 1.25 MG: 1.25 SOLUTION, CONCENTRATE RESPIRATORY (INHALATION) at 23:40

## 2019-04-24 RX ADMIN — METRONIDAZOLE 500 MG: 500 INJECTION, SOLUTION INTRAVENOUS at 04:49

## 2019-04-24 RX ADMIN — IPRATROPIUM BROMIDE 0.5 MG: 0.5 SOLUTION RESPIRATORY (INHALATION) at 23:40

## 2019-04-24 RX ADMIN — IPRATROPIUM BROMIDE 0.5 MG: 0.5 SOLUTION RESPIRATORY (INHALATION) at 04:12

## 2019-04-24 RX ADMIN — IPRATROPIUM BROMIDE 0.5 MG: 0.5 SOLUTION RESPIRATORY (INHALATION) at 15:25

## 2019-04-24 RX ADMIN — CALCIUM GLUCONATE: 94 INJECTION, SOLUTION INTRAVENOUS at 19:38

## 2019-04-24 RX ADMIN — METRONIDAZOLE 500 MG: 500 INJECTION, SOLUTION INTRAVENOUS at 10:42

## 2019-04-24 RX ADMIN — OXYCODONE AND ACETAMINOPHEN 1 TABLET: 5; 325 TABLET ORAL at 16:51

## 2019-04-24 RX ADMIN — Medication 10 ML: at 10:48

## 2019-04-24 RX ADMIN — AZTREONAM 1 G: 1 INJECTION, POWDER, LYOPHILIZED, FOR SOLUTION INTRAMUSCULAR; INTRAVENOUS at 23:34

## 2019-04-24 RX ADMIN — ASPIRIN 81 MG: 81 TABLET, COATED ORAL at 08:54

## 2019-04-24 RX ADMIN — AZTREONAM 1 G: 1 INJECTION, POWDER, LYOPHILIZED, FOR SOLUTION INTRAMUSCULAR; INTRAVENOUS at 17:44

## 2019-04-24 RX ADMIN — VENLAFAXINE 37.5 MG: 37.5 TABLET ORAL at 21:17

## 2019-04-24 RX ADMIN — LEVALBUTEROL 1.25 MG: 1.25 SOLUTION, CONCENTRATE RESPIRATORY (INHALATION) at 11:43

## 2019-04-24 RX ADMIN — METRONIDAZOLE 500 MG: 500 INJECTION, SOLUTION INTRAVENOUS at 21:12

## 2019-04-24 RX ADMIN — METOCLOPRAMIDE 10 MG: 5 INJECTION, SOLUTION INTRAMUSCULAR; INTRAVENOUS at 10:31

## 2019-04-24 RX ADMIN — CLOPIDOGREL BISULFATE 75 MG: 75 TABLET ORAL at 08:54

## 2019-04-24 RX ADMIN — LEVOFLOXACIN 500 MG: 5 INJECTION, SOLUTION INTRAVENOUS at 13:11

## 2019-04-24 RX ADMIN — IPRATROPIUM BROMIDE 0.5 MG: 0.5 SOLUTION RESPIRATORY (INHALATION) at 11:43

## 2019-04-24 RX ADMIN — LEVALBUTEROL 1.25 MG: 1.25 SOLUTION, CONCENTRATE RESPIRATORY (INHALATION) at 04:12

## 2019-04-24 RX ADMIN — LEVALBUTEROL 1.25 MG: 1.25 SOLUTION, CONCENTRATE RESPIRATORY (INHALATION) at 15:26

## 2019-04-24 RX ADMIN — SODIUM CHLORIDE 1000 ML: 9 INJECTION, SOLUTION INTRAVENOUS at 19:26

## 2019-04-24 RX ADMIN — VENLAFAXINE 37.5 MG: 37.5 TABLET ORAL at 13:14

## 2019-04-24 RX ADMIN — DILTIAZEM HYDROCHLORIDE 30 MG: 30 TABLET, FILM COATED ORAL at 17:45

## 2019-04-24 RX ADMIN — ENOXAPARIN SODIUM 40 MG: 100 INJECTION SUBCUTANEOUS at 08:54

## 2019-04-24 RX ADMIN — IPRATROPIUM BROMIDE 0.5 MG: 0.5 SOLUTION RESPIRATORY (INHALATION) at 19:42

## 2019-04-24 RX ADMIN — I.V. FAT EMULSION 100 ML: 20 EMULSION INTRAVENOUS at 19:43

## 2019-04-24 RX ADMIN — VENLAFAXINE 37.5 MG: 37.5 TABLET ORAL at 08:56

## 2019-04-24 RX ADMIN — AMIODARONE HYDROCHLORIDE 0.5 MG/MIN: 1.8 INJECTION, SOLUTION INTRAVENOUS at 07:33

## 2019-04-24 RX ADMIN — OXYCODONE AND ACETAMINOPHEN 1 TABLET: 5; 325 TABLET ORAL at 10:41

## 2019-04-24 RX ADMIN — METOCLOPRAMIDE 10 MG: 5 INJECTION, SOLUTION INTRAMUSCULAR; INTRAVENOUS at 19:30

## 2019-04-24 RX ADMIN — PANTOPRAZOLE SODIUM 40 MG: 40 INJECTION, POWDER, FOR SOLUTION INTRAVENOUS at 10:48

## 2019-04-24 RX ADMIN — METHYLPREDNISOLONE SODIUM SUCCINATE 20 MG: 40 INJECTION, POWDER, FOR SOLUTION INTRAMUSCULAR; INTRAVENOUS at 00:27

## 2019-04-24 RX ADMIN — FUROSEMIDE 40 MG: 10 INJECTION, SOLUTION INTRAMUSCULAR; INTRAVENOUS at 08:54

## 2019-04-24 RX ADMIN — SODIUM CHLORIDE: 9 INJECTION, SOLUTION INTRAVENOUS at 19:27

## 2019-04-24 RX ADMIN — LEVALBUTEROL 1.25 MG: 1.25 SOLUTION, CONCENTRATE RESPIRATORY (INHALATION) at 19:42

## 2019-04-24 RX ADMIN — IPRATROPIUM BROMIDE 0.5 MG: 0.5 SOLUTION RESPIRATORY (INHALATION) at 08:16

## 2019-04-24 ASSESSMENT — PAIN SCALES - GENERAL
PAINLEVEL_OUTOF10: 10
PAINLEVEL_OUTOF10: 0
PAINLEVEL_OUTOF10: 5
PAINLEVEL_OUTOF10: 10
PAINLEVEL_OUTOF10: 10
PAINLEVEL_OUTOF10: 6

## 2019-04-24 ASSESSMENT — ENCOUNTER SYMPTOMS
NAUSEA: 0
WHEEZING: 0
EYE DISCHARGE: 0
EYE REDNESS: 0
ABDOMINAL PAIN: 0
ABDOMINAL DISTENTION: 0
COUGH: 0
APNEA: 0
CONSTIPATION: 0
SHORTNESS OF BREATH: 0
DIARRHEA: 0
STRIDOR: 0
CHEST TIGHTNESS: 0
SORE THROAT: 0
VOMITING: 0

## 2019-04-24 ASSESSMENT — PAIN DESCRIPTION - PAIN TYPE: TYPE: CHRONIC PAIN

## 2019-04-24 ASSESSMENT — PAIN SCALES - WONG BAKER: WONGBAKER_NUMERICALRESPONSE: 4

## 2019-04-24 NOTE — FLOWSHEET NOTE
04/24/19 1447   Encounter Summary   Services provided to: Patient   Referral/Consult From: Ольга   Continue Visiting   (4/24/19V)   Volunteer Visit Yes   Complexity of Encounter Low   Length of Encounter 15 minutes   Routine   Type Follow up   Spiritual/Amish   Type Spiritual support   Intervention [address] Patient received communion

## 2019-04-24 NOTE — PLAN OF CARE
Problem: Risk for Impaired Skin Integrity  Goal: Tissue integrity - skin and mucous membranes  Description  Structural intactness and normal physiological function of skin and  mucous membranes. Outcome: Ongoing  Note:   No new signs of skin breakdown. Patient turned Q2 hours and PRN. Will continue to monitor. Problem: Falls - Risk of:  Goal: Will remain free from falls  Description  Will remain free from falls  Outcome: Ongoing  Note:   The patient remained free from falls this shift, call light within reach, bed in locked and lowest position. Side rails up x2. Continue to monitor closely. Problem: Nutrition  Goal: Optimal nutrition therapy  Description       Outcome: Ongoing  Note:   Patient NPO, on TPN. Problem: Pain:  Goal: Pain level will decrease  Description  Pain level will decrease  Outcome: Ongoing  Note:   Patient has been offered pharmacological and non-pharmacological methods of pain relief during this shift. Patient appears satisfied with current regimien. Patient has slept quietly with no complaints of pain for the majority of this shift.

## 2019-04-24 NOTE — PROGRESS NOTES
[address] [facility] [address]   INPATIENT OCCUPATIONAL THERAPY  PROGRESS NOTE  Date: 2019  Patient Name: Petrona Kline      Room:   MRN: [id number]    : 1950  (77 y.o.) Gender: female     Discharge Recommendations:  [address]       Referring Practitioner: Janie Mckeon MD  Diagnosis: Sepsis due to UTI  General  Chart Reviewed: Yes  Patient assessed for rehabilitation services?: Yes  Referring Practitioner: Janie Mckeon MD  Diagnosis: Sepsis due to UTI    Restrictions  Restrictions/Precautions: Fall Risk, Contact Precautions, Isolation(droplet precautions)  Implants present? : Pacemaker  Other position/activity restrictions: LLE in a long leg cast      Subjective \"therapist asked me if I was sleeping at night I said no\"   Pain Level: 10  Overall Orientation Status: Within Functional Limits     Pain Assessment  Pain Level: 10  Pain Type: Chronic pain(general pain )    Objective Pt lethargic, fatigued and having difficulty focusing on directions. Retrograde massage and edema glove as well as positioning for LUE. Positioned patient on L hip as well. Pt education on glove and self range with poor fair minus carryover  Continue POC      Bed mobility  Rolling to Left: Dependent/Total  Supine to Sit: (pt declined to sit )                     Type of ROM/Therapeutic Exercise  Type of ROM/Therapeutic Exercise: Self PROM;PROM  Exercises  Shoulder Flexion: x  Shoulder Extension: x  Elbow Flexion: x  Elbow Extension: x  Finger Flexion: x  Finger Extension: x  Grasp/Release: x   LUE                        Assessment  Performance deficits / Impairments: Decreased functional mobility ; Decreased ADL status; Decreased ROM; Decreased strength;Decreased endurance;Decreased coordination  Assessment: Pt will benefit from OT to improve pt's participation and independence w ADLs, improve bilateral UE strength & UB endurance to assist w selfcare.    Prognosis: Good  Activity Tolerance: Patient limited by fatigue;Patient limited by pain  Safety Devices in place: Yes  Type of devices: Left in bed;Call light within reach; Patient at risk for falls             Patient Education:     Learner:patient  Method: explanation       Outcome: demonstrated understanding     Plan  Safety Devices  Safety Devices in place: Yes  Type of devices: Left in bed, Call light within reach, Patient at risk for falls  Plan  Times per week: 5  Times per day: Daily  Current Treatment Recommendations: ROM, Strengthening, Patient/Caregiver Education & Training, Self-Care / ADL, Safety Education & Training, Functional Mobility Training, Equipment Evaluation, Education, & procurement  Plan Comment: continue Ot      Goals  Short term goals  Time Frame for Short term goals: by discharge  Short term goal 1: pt will participate in 15-20 minutes bilateral UE therapeutic exercises to improve rt UE strength,increase lt UE (rom,coordination,decrease swelling)  Short term goal 2: pt will demonstrates increase indpendence w ADLS (needing min assist w grooming, UB bathing and dressing) using pacing/ECWS technique  Short term goal 3: Improve bed mobility for selfcare activities  Short term goal 4: Educate pt in ECWS techniques for selfcare  Short term goal 5: Assess safety w transfers  & progress to lower body ADLS when pt is appropriate    OT Individual Minutes  Time In: 1002  Time Out: [facility] 7715  Minutes: 24      Electronically signed by KORIAN Collins on 4/24/19 at 10:34 AM

## 2019-04-24 NOTE — PROGRESS NOTES
Natalie rounded on patient. RN updated him that patient has had no complaints of nausea or vomiting throughout night. Patient's bowel sounds are active. Dr. Miguel Rowe said to start patient back on clear liquid diet today.

## 2019-04-24 NOTE — PROGRESS NOTES
Writer KarolServed Dr. Osorio Late updating him that patient's occult blood stool came back positive.

## 2019-04-24 NOTE — PROGRESS NOTES
1: (P) PROM/AAROM to B UE & R LE, 2x10 or 20x each(Limited R UE IR (pain); refused L LE ROM (pain))  Other exercises 2: (P) (Limited ROM on L LE due to pain/cast. )   PROM LUE (degrees)  LUE General PROM: (P) limited L ham/wrist ROM due to CVA                    Assessment   Body structures, Functions, Activity limitations: (P) Decreased functional mobility ; Decreased ADL status; Decreased strength; Increased Pain  Assessment: (P) pain limiting ROM exercises  Specific instructions for Next Treatment: (P) progress to mobility as able  Patient Education: (P) Active ROM for edema control  REQUIRES PT FOLLOW UP: (P) Yes  Activity Tolerance  Activity Tolerance: (P) Patient limited by pain; Patient Tolerated treatment well     G-Code     OutComes Score                                                  AM-PAC Score             Goals  Short term goals  Time Frame for Short term goals: (P) 10 visits  Short term goal 1: (P) improve strength RUE/RLE to 4/5 to assist in bed mobility and transfers  Short term goal 2: (P) improve bed mobility to minx1  Short term goal 3: (P) improve transfers to minx1  Short term goal 4: (P) progress to Gait and/or use of wheelchair for mobility  Patient Goals   Patient goals : (P) return home    Plan    Plan  Times per week: (P) 5-7x/wk  Times per day: (P) Daily  Specific instructions for Next Treatment: (P) progress to mobility as able  Current Treatment Recommendations: (P) ROM, Strengthening, Functional Mobility Training, Transfer Training  Plan Comment: to continue PT per POC  Safety Devices  Type of devices: (P) Call light within reach, Left in bed  Restraints  Initially in place: Yes  Restraints: B wrist restraint     Therapy Time   Individual Concurrent Group Co-treatment   Time In 1510         Time Out 1523         Minutes [address], PTA   Electronically signed by Harry Valerio PTA on 4/24/2019 at 4:28 PM

## 2019-04-24 NOTE — PROGRESS NOTES
ICU Progress Note (Non-Vent)  [facility]    Patient - Angelika Jay,  Age - 77 y.o.    - 1950      Room Number -    N -  [id number]   [facility] # - [de-identified]  Date of Admission -  2019  2:48 PM    Follow-up: urosepsis, septic shock/hypotension, acute respiratory failure     Events of Past 24 Hours   Extubated . O2 saturation 94 on 1L via NC. Levophed at 3mcg/min overnight. On amiodarone drip 0.5mg/min. TPN at this time in addition to clear liquid diet. Episode of coffee ground episode yesterday. Hemoccult stool +. Mild discomfort at site of cholecystostomy incision. Denies abdominal pain, nausea, wheezing, coughing, shortness of breath, weakness, chills, or sweats. Feels improved compared to yesterday. Vitals    height is 5' (1.524 m) and weight is 131 lb 13.4 oz (59.8 kg). Her oral temperature is 97.8 °F (36.6 °C). Her blood pressure is 122/59 (abnormal) and her pulse is 94. Her respiration is 22 and oxygen saturation is 96%. Temperature Range: Temp: 97.8 °F (36.6 °C) Temp  Av.7 °F (36.5 °C)  Min: 96.6 °F (35.9 °C)  Max: 98.2 °F (36.8 °C)  BP Range:  Systolic (64AQH), GKR:66 , Min:64 , SWP:344     Diastolic (26MTC), STJ:86, Min:37, Max:73    Pulse Range: Pulse  Av.7  Min: 91  Max: 118  Respiration Range: Resp  Av.8  Min: 17  Max: 27  Current Pulse Ox[de-identified]  SpO2: 96 %  24HR Pulse Ox Range:  SpO2  Av.6 %  Min: 90 %  Max: 100 %  Oxygen Amount and Delivery: O2 Flow Rate (L/min): 2 L/min    Wt Readings from Last 3 Encounters:   19 131 lb 13.4 oz (59.8 kg)   19 89 lb (40.4 kg)   19 94 lb 1.6 oz (42.7 kg)     I/O       Intake/Output Summary (Last 24 hours) at 2019 0958  Last data filed at 2019 0453  Gross per 24 hour   Intake 3883.52 ml   Output 2990 ml   Net 893.52 ml     DRAIN/TUBE OUTPUT       Invasive Lines   Right IJ day 11. Medications      metoclopramide  10 mg Intravenous Q6H    methylPREDNISolone  20 mg Intravenous Q12H    diltiazem  30 mg Oral 4 times per day    fat emulsion  100 mL Intravenous Daily    furosemide  40 mg Intravenous Daily    metroNIDAZOLE  500 mg Intravenous Q8H    magnesium sulfate  1 g Intravenous Once    pantoprazole  40 mg Intravenous Daily    And    sodium chloride (PF)  10 mL Intravenous Daily    sodium chloride  250 mL Intravenous Once    ipratropium  0.5 mg Nebulization Q4H    insulin lispro  0-12 Units Subcutaneous Q6H    levalbuterol  1.25 mg Nebulization Q4H    levofloxacin  500 mg Intravenous Q24H    venlafaxine  37.5 mg Oral TID    clopidogrel  75 mg Oral Daily    aspirin  81 mg Oral Daily    sodium chloride flush  10 mL Intravenous 2 times per day    enoxaparin  40 mg Subcutaneous Daily     hydrOXYzine, sodium chloride flush, metoprolol, sodium chloride nebulizer, fentanNYL, oxyCODONE-acetaminophen, magnesium sulfate, sodium phosphate IVPB **OR** sodium phosphate IVPB, potassium chloride **OR** potassium alternative oral replacement **OR** potassium chloride, glucose, dextrose, glucagon (rDNA), dextrose, milk and molasses, sodium chloride flush, acetaminophen  IV Drips/Infusions   PN-Adult 2-in-1 Central Line (Standard) 65 mL/hr at 04/23/19 1823    Amiodarone 0.5 mg/min (04/24/19 0733)    norepinephrine 3 mcg/min (04/24/19 0025)    dextrose         Diet/Nutrition   PN-Adult 2-in-1 Central Line (Standard)  DIET CLEAR LIQUID;    Exam      Constitutional - Alert, awake and oriented  General Appearance developed, well nourished  HEENT - normocephalic, atraumatic. Lungs - nasal cannula in place; on 1L. Coarse, decreased breath sounds. Chest expands equally, no wheezes, rales or rhonchi. Cardiovascular - Pacemaker in place. Heart sounds are normal.  normal rate and rhythm regular, no murmur, gallop or rub. Abdomen - cholecystectomy tube in place.   soft, tender at the incision, nondistended  Neurologic - hx of CVA with chronic left sided hemiparesis. Skin - no bruising or bleeding  Extremities - LE bilateral pitting edema (L>R); left UE edema. no cyanosis, clubbing         Lab Results   CBC     Lab Results   Component Value Date    WBC 12.8 04/24/2019    RBC 3.53 04/24/2019    RBC 3.66 05/23/2012    HGB 10.5 04/24/2019    HCT 32.2 04/24/2019     04/24/2019     05/23/2012    MCV 91.2 04/24/2019    MCH 29.6 04/24/2019    MCHC 32.4 04/24/2019    RDW 15.2 04/24/2019    METASPCT 2 04/11/2019    LYMPHOPCT 4 04/11/2019    MONOPCT 2 04/11/2019    BASOPCT 0 04/11/2019    MONOSABS 0.96 04/11/2019    LYMPHSABS 1.92 04/11/2019    EOSABS 0.00 04/11/2019    BASOSABS 0.00 04/11/2019    DIFFTYPE NOT REPORTED 04/11/2019       BMP   Lab Results   Component Value Date     04/24/2019    K 4.2 04/24/2019     04/24/2019    CO2 27 04/24/2019    BUN 28 04/24/2019    CREATININE <0.40 04/24/2019    GLUCOSE 119 04/24/2019    GLUCOSE 87 05/21/2012       LFTS  Lab Results   Component Value Date    ALKPHOS 80 04/24/2019    ALT 19 04/24/2019    AST 22 04/24/2019    PROT 4.4 04/24/2019    BILITOT 0.33 04/24/2019    BILIDIR 0.20 04/18/2019    IBILI 0.26 04/18/2019    LABALBU 1.7 04/24/2019    LABALBU 4.6 05/17/2012       ABG ABGs:   Lab Results   Component Value Date    PHART 7.519 04/19/2019    PO2ART 73.3 04/19/2019    OOH0GAF 42.5 04/19/2019       Lab Results   Component Value Date    MODE PRVC 04/19/2019         INR  Recent Labs     04/22/19  0502 04/23/19  0400 04/24/19  0422   PROTIME 15.1* 16.5* 16.3*   INR 1.2 1.3 1.3       APTT  No results for input(s): APTT in the last 72 hours. Lactic Acid  Lab Results   Component Value Date    LACTA 2.1 04/14/2019    LACTA 1.5 04/12/2019    LACTA 1.9 04/12/2019        BNP   No results for input(s): BNP in the last 72 hours. Cultures       Radiology     CXR  4/24/19  To me slightly better.  less bilateral pleural effusions    1.  No interval change of infiltrate and atelectasis at the left lung base. Stable mild infiltrate in the left upper lobe.       2.  Mild interval improvement of infiltrate at the right lung base.       3.  Stable small bilateral pleural effusions, left larger than right.       4.  Mild CHF, stable. CT Scans    (See actual reports for details)      SYSTEMS ASSESSMENT    Neuro   Alert and oriented. Off sedation    Hx of CVA  - chronic left hemiparesis    Respiratory   Acute hypoxic respiratory failure  - extubated 4/19  - saturation 94% on 1L NC. Wean oxygen as tolerated. Keep O2 sat > 88%     Bilateral pleural effusions (L > R)  - consider diuresis once blood pressure stabilizes  - positive fluid balance of 893.5ml over past 24hr     Hx COPD with wheezing  - continue xopenex and Atrovent   - decreased Solu-Medrol to 20 mg daily      Cardiovascular   Septic shock/hypotenision  - on levophed @ 3. Titrate to MAP > 65. Wean as tolerated. ,  Check lactic acid level and a CVP     On amiodarone drip 0.5mg/min.      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  - IR placed cholecystostomy on 4/22/19. Procedure uncomplicated. - bilious output 80ml overnight  - fluid analysis negative at this time. Cultures pending.   - on Levaquin and Flagyl   - WBC 12.8      GI bleed  - episode of coffee ground emesis. Stool hemoccult +.  - Hgb 10.5; yesterday 10.9  - gen surg following  - Pt made NPO yesterday. Ok to restart clear liquid diet today. Renal   BUN 28. Creatinine < 0.40    Infectious Disease   Urosepsis  - + for E. Coli  - WBC 12.8  - on Levaquin and flagyl  - ID following     Since admission  + MRSA nasal swab culture  + budding yeast from sputum      Hematology/Oncology   Anemia  - Hgb 10.9 down from 11.7. Will monitor.      Thrombocytopenia  - platelets 782 from 601 yesterday.   - on ASA and Plavix      DVT prophylaxis  - on lovenox     Endocrine Social/Spiritual/DNR/Disposition/Other         Electronically signed by Riley Mendez on 4/24/2019 at 9:58 AM    Patient seen and examined independently by me. Above discussed and I agree with medical student note except where indicated in the EMR revision history. Also see my additional comments and changes indicated by discrete font, text color, italics, and/or initials. Labs, cultures, and radiographs where available were reviewed.      Electronically signed by Ana M Amos MD on 4/24/2019 at 12:21 PM

## 2019-04-24 NOTE — PROGRESS NOTES
Nutrition Assessment (Parenteral Nutrition)    Type and Reason for Visit: Reassess    Nutrition Recommendations: Continue Clear Liquid Diet as ordered. Recommend to start Ensure clear twice daily. TPN additive changes: Sodium acetate 0 to 50 mEq, Sodium Chloride 115 to 65 mEq, Potassium acetate 0 to 15 mEq, Potassium chloride 55 to 40 mEq, Calcium gluconate 9 to 12 mEq, add MVI or trace elements. Nutrition Assessment: Patient stable from a nutritional standpoint as now TPN at goal rate and receiving 100% of nutrient needs. Cholecystomy tube placed 4-22-19, clear liquid diet started yesterday but held due to emesis, will start again today. TPN and lipids to continue. Will monitor nutrition progression. Malnutrition Assessment:  · Malnutrition Status: At risk for malnutrition  · Context: Acute illness or injury  · Findings of the 6 clinical characteristics of malnutrition (Minimum of 2 out of 6 clinical characteristics is required to make the diagnosis of moderate or severe Protein Calorie Malnutrition based on AND/ASPEN Guidelines):  1. Energy Intake-Less than or equal to 75% of estimated energy requirement(Previously; improving with parenteral nutrition), Greater than or equal to 5 days    2. Weight Loss-Unable to assess(edema present),    3. Fat Loss-Unable to assess,    4. Muscle Loss-Unable to assess,    5. Fluid Accumulation-(moderate fluid accumulation), Extremities  6.   Strength-Not measured    Nutrition Risk Level: High    Nutrient Needs:  · Estimated Daily Total Kcal: 1683-1030 based on wt of 25-27 per kg using wt of 57.2 kg  · Estimated Daily Protein (g): 63-68 based on 1.4-1.5 gm/kg IBW(revised)    Nutrition Diagnosis:   · Problem: Inadequate oral intake  · Etiology: related to Alteration in GI function, Insufficient energy/nutrient consumption     Signs and symptoms:  as evidenced by Nutrition support - PN    Objective Information:  · Nutrition-Focused Physical Findings: Edema: +1 RUE, +4 pitting LUE, +2 RLE, LLE.  GI: LBM 4/23  · Wound Type: Stage I, Pressure Ulcer(Stage pressure ulcer on buttocks; thigh wound)  · Current Nutrition Therapies:  · Oral Diet Orders: Clear Liquid   · Oral Diet intake: 0%  · Oral Nutrition Supplement (ONS) Orders: None  · ONS intake: (None)  · Parenteral Nutrition Orders:  · Type and Formula: Premix Central, 2-in-1 Custom   · Lipids: 100ml, Daily  · Rate/Volume: 65 ml/ 1560 ml daily  · Duration: Continuous  · Current PN Order Provides: 1573 kcals, 78 gm of protein (lipids included)  · Goal PN Orders Provides: 8596-6069 kcal; 63-68 gm pro  · Additional Calories: None  · Anthropometric Measures:  · Ht: 5' (152.4 cm)   · Current Body Wt: 131 lb 13.4 oz (59.8 kg)  · Admission Body Wt: 102 lb (46.3 kg)  · Ideal Body Wt: 100 lb (45.4 kg), % Ideal Body 102% based on admission wt  · BMI Classification: BMI 25.0 - 29.9 Overweight    Nutrition Interventions:   Continue current diet, Start ONS, Modify current Parenteral Nutrition  Continued Inpatient Monitoring, Education Not Indicated    Nutrition Evaluation:   · Evaluation: Progressing toward goals   · Goals: PN to meet greater 90% of estimated nutrition needs   · Monitoring: Nutrition Progression, Meal Intake, Supplement Intake, Diet Tolerance, PN Intake, PN Tolerance, Wound Healing, I&O, Weight, Pertinent Labs, Diarrhea, Nausea or Vomiting, Monitor Bowel Function      Tiajuana Goodell, RD, LD  Office phone [phone]

## 2019-04-24 NOTE — PROGRESS NOTES
General Surgery Progress Note            PATIENT NAME: Nimo Melvin     TODAY'S DATE: 4/24/2019, 5:51 AM    SUBJECTIVE/OBJECTIVE:      VITALS:  BP (!) 110/50   Pulse 98   Temp 98 °F (36.7 °C)   Resp 20   Ht 5' (1.524 m)   Wt 131 lb 13.4 oz (59.8 kg)   SpO2 96%   BMI 25.75 kg/m²      Patient seen and examined  Resting comfortably  Denies N/V or pain  Tachy RR  LCTAB, diminished at the bases  Abdomen soft, ND. Minimally tender. +BS.  Cholecystostomy output bilious  2+ peripheral edema    INTAKE/OUTPUT:      Intake/Output Summary (Last 24 hours) at 4/24/2019 0551  Last data filed at 4/24/2019 0453  Gross per 24 hour   Intake 3883.52 ml   Output 2990 ml   Net 893.52 ml       CBC with Differential:    Lab Results   Component Value Date    WBC 12.8 04/24/2019    RBC 3.53 04/24/2019    RBC 3.66 05/23/2012    HGB 10.5 04/24/2019    HCT 32.2 04/24/2019     04/24/2019     05/23/2012    MCV 91.2 04/24/2019    MCH 29.6 04/24/2019    MCHC 32.4 04/24/2019    RDW 15.2 04/24/2019    METASPCT 2 04/11/2019    LYMPHOPCT 4 04/11/2019    MONOPCT 2 04/11/2019    BASOPCT 0 04/11/2019    MONOSABS 0.96 04/11/2019    LYMPHSABS 1.92 04/11/2019    EOSABS 0.00 04/11/2019    BASOSABS 0.00 04/11/2019    DIFFTYPE NOT REPORTED 04/11/2019     CMP:    Lab Results   Component Value Date     04/24/2019    K 4.2 04/24/2019     04/24/2019    CO2 27 04/24/2019    BUN 28 04/24/2019    CREATININE <0.40 04/24/2019    GFRAA CANNOT BE CALCULATED 04/24/2019    LABGLOM CANNOT BE CALCULATED 04/24/2019    GLUCOSE 119 04/24/2019    GLUCOSE 87 05/21/2012    PROT 4.4 04/24/2019    LABALBU 1.7 04/24/2019    LABALBU 4.6 05/17/2012    CALCIUM 7.7 04/24/2019    BILITOT 0.33 04/24/2019    ALKPHOS 80 04/24/2019    AST 22 04/24/2019    ALT 19 04/24/2019           ASSESSMENT/PLAN:    Principal Problem:    Sepsis due to urinary tract infection ([facility] [address]  Active Problems:    Cystitis    Emphysematous cystitis    Septic shock (HCC)

## 2019-04-24 NOTE — PROGRESS NOTES
Infectious disease Consult Note      Patient: Lisandra Barboza  : 1950  Acct#:  [id number]     Date:  2019    Assessment:     Cholecystitis status post cholecystectomy tube  no growth on  fluid so far. Ecoli Emphysematous cystitis treated     Aspiration pneumonia of right lower lobe treated    Sepsis /Septic shock     Yeast growth on sputum culture suspect contamination     Leukocytosis improved    Acute hypoxic resp failure     MRSA carrier    Urinary retention ,Tran cath in place     Anemia      PCN allergy                 Recommendations:     Discontinue IV Levaquin to avoid the QT prolongation , patient on amiodarone    Continue IV Flagyl and aztreonam  Follow GB fluid cultures and adjust antibiotics if needed. Follow CBC , renal function closely . Continue supportive care . Subjective:       History of Present Illness  Patient is a 77 y.o.  female admitted with Sepsis due to urinary tract infection   who is seen in consult for the same . She presented w dysuria and lower abd pain for around a week associated with vomiting ,was found hypotensive with leukocytosis . CT suggested emphysematous cystitis,possible gastric outlet obstruction. CXR showed a right lower infiltrate. High CA-19-9,CEA  Allergy to [facility] w SOB   Urine culture  grew ESCHERICHIA COLI resistant to Ampicillin ,sensitive to all others tested ABXS . Blood cultures negative . Mycoplasma IGM 0.97   YEAST MODERATE GROWTH on sputum culture   S/P EGD   with reported esophagitis. GB US Findings suggesting acute cholecystitis. HIIDA showed absent gallbladder filling. She was extubated on   Status post cholecystectomy tube  by interventional radiology. Interval history :  She denied abdominal pain, no fever  on Levophed and amiodarone drip .   She is tolerating clear liquid diet  On TPN   WBC 12.8     Past Medical History:   Diagnosis Date    Abnormal computed tomography of cervical spine sclerotic bone appearance     CVA (cerebral vascular accident) ([address]     left  side weakness    GERD (gastroesophageal reflux disease)     Hypertension     Paraproteinemia     Weight loss       Past Surgical History:   Procedure Laterality Date    INTUBATION  4/14/2019         PACEMAKER PLACEMENT  07/2011    Pacemaker is Medtronic Revo (compatible). Leads placed in 1995 are NOT MRI compatible. Placed at [facility]. V's per Dr. Arnold Zuinga can not have an MRI.  UPPER GASTROINTESTINAL ENDOSCOPY N/A 4/16/2019    EGD ESOPHAGOGASTRODUODENOSCOPY @ BEDSIDE  ICU 2002 performed by Niall Mcdaniels MD at [id number]          Admission Meds  No current facility-administered medications on file prior to encounter. Current Outpatient Medications on File Prior to Encounter   Medication Sig Dispense Refill    oxyCODONE-acetaminophen (PERCOCET) 5-325 MG per tablet Take 1 tablet by mouth every 6 hours as needed for Pain.  senna-docusate (PERICOLACE) 8.6-50 MG per tablet Take 1 tablet by mouth 2 times daily      budesonide-formoterol (SYMBICORT) 160-4.5 MCG/ACT AERO Inhale 2 puffs into the lungs 2 times daily      sucralfate (CARAFATE) 1 GM/10ML suspension Take 1 g by mouth 4 times daily       traZODone (DESYREL) 50 MG tablet Take 50 mg by mouth nightly      tiZANidine (ZANAFLEX) 2 MG tablet Take 2 mg by mouth every 8 hours as needed (left knee)       aspirin 81 MG tablet Take 81 mg by mouth daily      clopidogrel (PLAVIX) 75 MG tablet TAKE 1 TABLET DAILY 30 tablet 2    DOCQLACE 100 MG capsule TAKE 1 CAPSULE BY MOUTH IN THE MORNING & IN THE EVENING -DRINK PLENTYOF WATER WHILE TAKING THIS MEDICINE 30 capsule 1    amLODIPine (NORVASC) 10 MG tablet Take 10 mg by mouth daily.  LORazepam (ATIVAN) 0.5 MG tablet Take 0.5 mg by mouth every 6 hours as needed for Anxiety.  therapeutic multivitamin-minerals (THERAGRAN-M) tablet Take 1 tablet by mouth daily.       omeprazole (PRILOSEC) 20 MG capsule Take 20 mg by mouth daily.  venlafaxine (EFFEXOR) 37.5 MG tablet Take 37.5 mg by mouth 3 times daily.  Misc. Devices [facility] [address] wheelchair with left fupper extremity support  Dx: stroke with left hemiparesis. 1 each 0           Allergies  Allergies   Allergen Reactions    Penicillins Shortness Of Breath and Rash    Amoxicillin         Social   Social History     Tobacco Use    Smoking status: Current Some Day Smoker     Packs/day: 1.00     Years: 30.00     Pack years: 30.00    Smokeless tobacco: Never Used   Substance Use Topics    Alcohol use: Not Currently     Alcohol/week: 16.8 oz     Types: 28 Glasses of wine per week                History reviewed. No pertinent family history. Review of Systems  Other than above 12 systems reviewed negative . Tolerating antibiotics. Physical Exam  BP (!) 96/52   Pulse 94   Temp 97.8 °F (36.6 °C) (Oral)   Resp 21   Ht 5' (1.524 m)   Wt 131 lb 13.4 oz (59.8 kg)   SpO2 93%   BMI 25.75 kg/m²           General Appearance: alert ,NAD . Skin: warm and dry, no rash or erythema  Head: normocephalic and atraumatic  Eyes: pupils equal, round  Neck: neck supple and non tender   Pulmonary/Chest: coarse to auscultation bilaterally- no wheezes, rales or rhonchi  Cardiovascular: normal rate, regular rhythm, normal S1 and S2, no murmurs.   Abdomen: soft,mild upper abdomen tenderness  -distended,  no masses or organomegaly, gallbladder drain with  bile drainage   Extremities: no cyanosis, clubbing   Edema   Right IJ line    Tran catheter in place    Data Review:    Recent Labs     04/22/19  0502 04/23/19  0400 04/24/19 0422   WBC 21.2* 17.9* 12.8*   HGB 11.7* 10.9* 10.5*   HCT 35.7* 33.6* 32.2*   MCV 91.9 91.3 91.2    138* 141*     Recent Labs     04/22/19  0502 04/23/19  0400 04/24/19  0422    135 136   K 4.9 4.1 4.2   CL 97* 98 100   CO2 29 27 27   PHOS 4.3 4.0 3.5   BUN 22 27* 28*   CREATININE <0.40* <0.40* <0.40*     Recent Labs contrast reflux into the distal esophagus. There is no evidence of bowel obstruction. The appendix is not definitely visualized. No focal pericecal inflammatory changes are evident. Pelvis: The urinary bladder is decompressed by Tran catheter. No pelvic mass is seen. Peritoneum/Retroperitoneum: Small amount of free fluid in the pelvic cavity. No free air or focal fluid collection. No abnormal lymph node. Normal abdominal aortic caliber. Moderate calcific atherosclerosis. Bones/Soft Tissues: No acute osseous abnormality. Diffuse anasarca. Moderate degenerative changes in the lumbar spine. 1. Distended, contrast filled stomach with reflux of contrast into the esophagus. No enteric contrast is seen distal to the pylorus suggesting delayed gastric emptying in the setting of ileus. 2. New moderate layering bilateral pleural effusions with bilateral lower lobe atelectasis. 3. Small amount of nonspecific free fluid in the pelvic cavity. 4. Anasarca. 5. Cholelithiasis. Xr Chest (single View Frontal)    Result Date: 4/13/2019  EXAMINATION: SINGLE XRAY VIEW OF THE CHEST 4/13/2019 7:18 am COMPARISON: April 12, 2019 HISTORY: ORDERING SYSTEM PROVIDED HISTORY: dyspnea TECHNOLOGIST PROVIDED HISTORY: dyspnea Ordering Physician Provided Reason for Exam: dyspnea Acuity: Acute Type of Exam: Subsequent/Follow-up Additional signs and symptoms: dyspnea FINDINGS: A right IJ catheter is seen with its tip terminating at the superior cavoatrial junction. The left chest wall pacemaker and leads are stable. The cardiomediastinal silhouette is stable. There is interval increased opacity at the right lung base, may be related to atelectasis versus pneumonia. There is small atelectasis and mild pleural effusion at the left lung base. There is no pneumothorax. There is no acute osseous abnormality. Interval increased opacity at the right lung base, may be related to atelectasis versus pneumonia.  Small atelectasis and mild pleural effusion at the left lung, new since the prior study. Xr Femur Left (min 2 Views)    Result Date: 3/27/2019  EXAMINATION: 4 XRAY VIEWS OF THE LEFT FEMUR; 2 XRAY VIEWS OF THE LEFT KNEE; 3 XRAY VIEWS OF THE LEFT TIBIA AND FIBULA 3/27/2019 7:00 pm COMPARISON: Left hip 11/14/2015. HISTORY: ORDERING SYSTEM PROVIDED HISTORY: pain TECHNOLOGIST PROVIDED HISTORY: pain Ordering Physician Provided Reason for Exam: pt twisted wrong, lt knee pain, unable to straighten knee. Acuity: Acute Type of Exam: Initial FINDINGS: Left femur, four views: The bones are diffusely osteopenic. No acute fracture deformity. The hip joint is maintained. The femoral head projects within the acetabulum. No focal soft tissue abnormality is seen. Left knee, two views: The knee is flexed. No acute osseous abnormality. No joint effusion. Joint spaces are not optimally profiled but no significant arthritic changes are apparent. Left tib-fib, three views: There is evidence of a remote healed distal tibia fracture. No acute fracture or dislocation is seen. No focal soft tissue abnormality. No acute osseous abnormality of the left femur. No acute osseous abnormality of the left knee. No acute osseous abnormality of the left tib-fib. Healed remote distal tibia fracture. Marked osteopenia, likely due to disuse. Xr Knee Left (1-2 Views)    Result Date: 3/27/2019  EXAMINATION: 4 XRAY VIEWS OF THE LEFT FEMUR; 2 XRAY VIEWS OF THE LEFT KNEE; 3 XRAY VIEWS OF THE LEFT TIBIA AND FIBULA 3/27/2019 7:00 pm COMPARISON: Left hip 11/14/2015. HISTORY: ORDERING SYSTEM PROVIDED HISTORY: pain TECHNOLOGIST PROVIDED HISTORY: pain Ordering Physician Provided Reason for Exam: pt twisted wrong, lt knee pain, unable to straighten knee. Acuity: Acute Type of Exam: Initial FINDINGS: Left femur, four views: The bones are diffusely osteopenic. No acute fracture deformity. The hip joint is maintained. The femoral head projects within the acetabulum. No focal soft tissue abnormality is seen. Left knee, two views: The knee is flexed. No acute osseous abnormality. No joint effusion. Joint spaces are not optimally profiled but no significant arthritic changes are apparent. Left tib-fib, three views: There is evidence of a remote healed distal tibia fracture. No acute fracture or dislocation is seen. No focal soft tissue abnormality. No acute osseous abnormality of the left femur. No acute osseous abnormality of the left knee. No acute osseous abnormality of the left tib-fib. Healed remote distal tibia fracture. Marked osteopenia, likely due to disuse. Xr Knee Left (3 Views)    Result Date: 3/30/2019  EXAMINATION: 3 XRAY VIEWS OF THE LEFT KNEE 3/30/2019 10:58 am COMPARISON: March 27, 2018 HISTORY: ORDERING SYSTEM PROVIDED HISTORY: F/u L knee pain after cast TECHNOLOGIST PROVIDED HISTORY: AP, oblique, lateral F/u L knee pain after cast FINDINGS: Marked osteopenia. Interval casting, which degrades fine osseous detail question cortical offset in the lateral femoral metaphysis. No malalignment identified. No significant joint effusion. Interval casting. Question nondisplaced fracture in the lateral femoral metaphysis. Osteopenia. Xr Tibia Fibula Left (2 Views)    Result Date: 3/27/2019  EXAMINATION: 4 XRAY VIEWS OF THE LEFT FEMUR; 2 XRAY VIEWS OF THE LEFT KNEE; 3 XRAY VIEWS OF THE LEFT TIBIA AND FIBULA 3/27/2019 7:00 pm COMPARISON: Left hip 11/14/2015. HISTORY: ORDERING SYSTEM PROVIDED HISTORY: pain TECHNOLOGIST PROVIDED HISTORY: pain Ordering Physician Provided Reason for Exam: pt twisted wrong, lt knee pain, unable to straighten knee. Acuity: Acute Type of Exam: Initial FINDINGS: Left femur, four views: The bones are diffusely osteopenic. No acute fracture deformity. The hip joint is maintained. The femoral head projects within the acetabulum. No focal soft tissue abnormality is seen. Left knee, two views: The knee is flexed.   No acute Findings suggest gastric outlet obstruction. Ct Abdomen Pelvis W Iv Contrast    Result Date: 4/11/2019  EXAMINATION: CT OF THE ABDOMEN AND PELVIS WITH CONTRAST 4/11/2019 5:14 pm TECHNIQUE: CT of the abdomen and pelvis was performed with the administration of intravenous contrast. Multiplanar reformatted images are provided for review. Dose modulation, iterative reconstruction, and/or weight based adjustment of the mA/kV was utilized to reduce the radiation dose to as low as reasonably achievable. COMPARISON: None. HISTORY: ORDERING SYSTEM PROVIDED HISTORY: Abdominal pain TECHNOLOGIST PROVIDED HISTORY: IV Only Contrast Ordering Physician Provided Reason for Exam: patient c/o abd pain for an hour FINDINGS: Lower Chest: Trace pleural fluid bilaterally is noted. Indwelling cardiac pacemaker is present. Heart size is normal.  Coronary artery calcifications are evident. The esophagus is significantly dilated and fluid-filled. No paraesophageal adenopathy is evident. Organs: The spleen, pancreas, and adrenals are unremarkable. The liver contains hypodensities, likely cysts. The gallbladder is distended and contains a solitary gallstone. The kidneys excrete contrast bilaterally. Extrarenal collecting systems are noted. The ureters are mildly dilated down to the urinary bladder without evidence of intraluminal or ureteral obstructing calculi. GI/Bowel: Marked distention of the stomach is noted; this continues to the pylorus with appearance suggesting partial gastric outlet obstruction. Fluid is present in some small bowel loops and colon distal to the stomach. No small bowel obstruction. Pelvis: Marked distention of the urinary bladder is noted. 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 Distended ureters may be related to reflux or infection. No free pelvic fluid, pelvic or inguinal adenopathy is noted. Peritoneum/Retroperitoneum: No aortic aneurysm.   Mild to moderate plaque is noted in the infrarenal abdominal aorta and both proximal iliac arteries. Shotty lymph nodes are present around the aorta in the upper abdomen. No mesenteric adenopathy is noted. Bones/Soft Tissues: Degenerative changes are present in the hips and lower lumbar facets. Estimated biologic radiation dose for this procedure:258.77 mGy/cm2.     1. Dilatation of the thoracic esophagus filled with fluid. No obstructive process is noted. No adjacent enlarged lymph nodes. 2. Trace pleural fluid. 3. Marked distention of the stomach. This appears to extend to the pylorus. Partial gastric outlet obstruction is not excluded. 4. Bilateral dilated ureters without obstructing calculi. Urinary bladder is markedly distended with bladder wall air suggesting emphysematous cystitis. Dilatation of the ureters may be related to reflux or infection. 5. Atherosclerotic disease. 6. Other findings as above. Critical results were called by Dr. Abigail Sanchez MD to [address] on 4/11/2019 at 17:37. Xr Chest Portable    Result Date: 4/16/2019  EXAMINATION: SINGLE XRAY VIEW OF THE CHEST 4/16/2019 6:54 am COMPARISON: 04/15/2019, 610 hours HISTORY: ORDERING SYSTEM PROVIDED HISTORY: ETT placement TECHNOLOGIST PROVIDED HISTORY: ETT placement Ordering Physician Provided Reason for Exam: on vent Acuity: Acute Type of Exam: Initial 55-year-old female on ventilator; check endotracheal tube placement FINDINGS: Portable AP upright view of the chest. Endotracheal tube distal tip overlying the mid trachea approximately 4.1 cm above the level of the ron. Enteric tube traverses the GE junction with distal tip excluded from the field of view. Left subclavian approach cardiac pacemaker device distal lead tips relatively stable in position. Right internal jugular approach central venous catheter distal tip overlying the high right atrium, stable. Cardiac monitor leads overlie the chest. Atherosclerotic calcification of the thoracic aorta. Slight stable volume loss of the left hemithorax. No pneumothorax. No free air. Dense retrocardiac/left basilar airspace consolidation and small left-sided pleural effusion. Stable mild focal opacity at the right mid lung zone. Underlying COPD. Stable mild pulmonary vascular congestion and left-sided predominant parahilar opacity. Visualized osseous structures remain unchanged. 1. Stable multifocal airspace disease as detailed above with dense retrocardiac/left basilar airspace consolidation and small left-sided pleural effusion. Mild pulmonary vascular congestion. Findings may represent edema or multifocal pneumonia. 2. Underlying COPD. 3. Tubes and line as detailed above. Xr Chest Portable    Result Date: 4/15/2019  EXAMINATION: SINGLE XRAY VIEW OF THE CHEST 4/15/2019 6:47 am COMPARISON: 14 April 2019 HISTORY: ORDERING SYSTEM PROVIDED HISTORY: ETT placement TECHNOLOGIST PROVIDED HISTORY: ETT placement Ordering Physician Provided Reason for Exam: on vent Acuity: Acute Type of Exam: Initial FINDINGS: AP portable view of the chest time stamped at 612 hours demonstrates overlying cardiac monitoring electrodes. Endotracheal tube terminates 4 cm above the ron. Bipolar pacemaker enters from the left with intact leads in appropriate positions. Intestinal tube extends beyond the fundus of the stomach, tip not included. Right internal jugular catheter terminates at the cavoatrial junction. Heart size is normal.  Aortic arch is calcified. There is interval improvement in vascular congestion with resolution of perihilar opacities. Some bibasilar opacities remain. No extrapleural air is noted. Osseous structures are stable. Interval improvement in vascular congestion and bilateral opacities consistent with resolving pulmonary edema. Tubes and lines as above.      Xr Chest Portable    Result Date: 4/14/2019  EXAMINATION: SINGLE XRAY VIEW OF THE CHEST 4/14/2019 7:57 am COMPARISON: Portable chest 04/13/2019. HISTORY: ORDERING SYSTEM PROVIDED HISTORY: Intubation TECHNOLOGIST PROVIDED HISTORY: Intubation Ordering Physician Provided Reason for Exam: intubation Acuity: Acute Type of Exam: Initial Additional signs and symptoms: intubation FINDINGS: Endotracheal tube terminates over the midthoracic trachea. Dual-chamber pacemaker leads appear unchanged in position. Right IJ approach central venous catheter unchanged in position. Heart size not substantially changed. Perihilar and basilar opacities further increased. Left pleural effusion increased in size. Findings may reflect pulmonary edema, progressed from yesterday's exam.  Left pleural effusion increased in size. Xr Chest Portable    Result Date: 4/12/2019  EXAMINATION: SINGLE XRAY VIEW OF THE CHEST 4/12/2019 5:26 am COMPARISON: November 14, 2015. HISTORY: ORDERING SYSTEM PROVIDED HISTORY: line placement TECHNOLOGIST PROVIDED HISTORY: line placement Ordering Physician Provided Reason for Exam: New right side line placement. Acuity: Acute Type of Exam: Initial Additional signs and symptoms: New right side line placement. FINDINGS: Stable left pectoral trans venous cardiac pacer device. New right IJ central venous catheter with tip near the superior atrial caval junction. Normal lung volume. No new consolidation. Curvilinear radiopacity projecting over the right upper lobe likely represents artifact from a skin fold. No pleural effusion or pneumothorax. Stable cardiomediastinal silhouette and great vessels with redemonstration of atherosclerotic thoracic aorta. New right IJ central venous catheter with tip near the superior atrial caval junction. No pneumothorax. No new consolidation. Thank you for allowing me to participate in the care of your patient. Please feel free to contact me with any questions or concerns.      Mo Gabriel MD

## 2019-04-24 NOTE — PROGRESS NOTES
pain, frequency, hematuria and urgency. Musculoskeletal: Negative for arthralgias. Neurological: Negative for dizziness, tremors, seizures, syncope, facial asymmetry, speech difficulty, weakness, light-headedness, numbness and headaches. Hematological: Negative for adenopathy. Medications: Allergies:     Allergies   Allergen Reactions    Penicillins Shortness Of Breath and Rash    Amoxicillin        Current Meds:   Scheduled Meds:    metoclopramide  10 mg Intravenous Q6H    methylPREDNISolone  20 mg Intravenous Q12H    diltiazem  30 mg Oral 4 times per day    fat emulsion  100 mL Intravenous Daily    furosemide  40 mg Intravenous Daily    metroNIDAZOLE  500 mg Intravenous Q8H    magnesium sulfate  1 g Intravenous Once    pantoprazole  40 mg Intravenous Daily    And    sodium chloride (PF)  10 mL Intravenous Daily    sodium chloride  250 mL Intravenous Once    ipratropium  0.5 mg Nebulization Q4H    insulin lispro  0-12 Units Subcutaneous Q6H    levalbuterol  1.25 mg Nebulization Q4H    levofloxacin  500 mg Intravenous Q24H    venlafaxine  37.5 mg Oral TID    clopidogrel  75 mg Oral Daily    aspirin  81 mg Oral Daily    sodium chloride flush  10 mL Intravenous 2 times per day    enoxaparin  40 mg Subcutaneous Daily     Continuous Infusions:    PN-Adult 2-in-1 Central Line (Standard) 65 mL/hr at 04/23/19 1823    Amiodarone 0.5 mg/min (04/24/19 0733)    norepinephrine 3 mcg/min (04/24/19 0025)    dextrose       PRN Meds: hydrOXYzine, sodium chloride flush, metoprolol, sodium chloride nebulizer, fentanNYL, oxyCODONE-acetaminophen, magnesium sulfate, sodium phosphate IVPB **OR** sodium phosphate IVPB, potassium chloride **OR** potassium alternative oral replacement **OR** potassium chloride, glucose, dextrose, glucagon (rDNA), dextrose, milk and molasses, sodium chloride flush, acetaminophen    Data:     Past Medical History:   has a past medical history of Abnormal computed tomography of cervical spine, CVA (cerebral vascular accident) ([address] GERD (gastroesophageal reflux disease), Hypertension, Paraproteinemia, and Weight loss. Social History:   reports that she has been smoking. She has a 30.00 pack-year smoking history. She has never used smokeless tobacco. She reports that she drank about 16.8 oz of alcohol per week. She reports that she does not use drugs. Family History: History reviewed. No pertinent family history. Vitals:  BP (!) 122/59   Pulse 94   Temp 97.8 °F (36.6 °C) (Oral)   Resp 24   Ht 5' (1.524 m)   Wt 131 lb 13.4 oz (59.8 kg)   SpO2 92%   BMI 25.75 kg/m²   Temp (24hrs), Av.6 °F (36.4 °C), Min:96.6 °F (35.9 °C), Max:98.2 °F (36.8 °C)    Recent Labs     19  0858 19  1507 19  2231 19  0448   POCGLU 135* 176* 125* 135*       I/O(24Hr): Intake/Output Summary (Last 24 hours) at 2019 0733  Last data filed at 2019 0453  Gross per 24 hour   Intake 3883.52 ml   Output 2990 ml   Net 893.52 ml       Labs:    [unfilled]    Lab Results   Component Value Date/Time    SPECIAL NOT REPORTED 2019 06:00 PM    SPECIAL NOT REPORTED 2019 06:00 PM     Lab Results   Component Value Date/Time    CULTURE NO GROWTH 1 DAY 2019 04:59 PM       [unfilled]    Radiology:    S[facility] Jacobs Femur Left (min 2 Views)    Result Date: 3/27/2019  EXAMINATION: 4 XRAY VIEWS OF THE LEFT FEMUR; 2 XRAY VIEWS OF THE LEFT KNEE; 3 XRAY VIEWS OF THE LEFT TIBIA AND FIBULA 3/27/2019 7:00 pm COMPARISON: Left hip 2015. HISTORY: ORDERING SYSTEM PROVIDED HISTORY: pain TECHNOLOGIST PROVIDED HISTORY: pain Ordering Physician Provided Reason for Exam: pt twisted wrong, lt knee pain, unable to straighten knee. Acuity: Acute Type of Exam: Initial FINDINGS: Left femur, four views: The bones are diffusely osteopenic. No acute fracture deformity. The hip joint is maintained. The femoral head projects within the acetabulum.   No focal soft tissue abnormality am COMPARISON: March 27, 2018 HISTORY: ORDERING SYSTEM PROVIDED HISTORY: F/u L knee pain after cast TECHNOLOGIST PROVIDED HISTORY: AP, oblique, lateral F/u L knee pain after cast FINDINGS: Marked osteopenia. Interval casting, which degrades fine osseous detail question cortical offset in the lateral femoral metaphysis. No malalignment identified. No significant joint effusion. Interval casting. Question nondisplaced fracture in the lateral femoral metaphysis. Osteopenia. Xr Tibia Fibula Left (2 Views)    Result Date: 3/27/2019  EXAMINATION: 4 XRAY VIEWS OF THE LEFT FEMUR; 2 XRAY VIEWS OF THE LEFT KNEE; 3 XRAY VIEWS OF THE LEFT TIBIA AND FIBULA 3/27/2019 7:00 pm COMPARISON: Left hip 11/14/2015. HISTORY: ORDERING SYSTEM PROVIDED HISTORY: pain TECHNOLOGIST PROVIDED HISTORY: pain Ordering Physician Provided Reason for Exam: pt twisted wrong, lt knee pain, unable to straighten knee. Acuity: Acute Type of Exam: Initial FINDINGS: Left femur, four views: The bones are diffusely osteopenic. No acute fracture deformity. The hip joint is maintained. The femoral head projects within the acetabulum. No focal soft tissue abnormality is seen. Left knee, two views: The knee is flexed. No acute osseous abnormality. No joint effusion. Joint spaces are not optimally profiled but no significant arthritic changes are apparent. Left tib-fib, three views: There is evidence of a remote healed distal tibia fracture. No acute fracture or dislocation is seen. No focal soft tissue abnormality. No acute osseous abnormality of the left femur. No acute osseous abnormality of the left knee. No acute osseous abnormality of the left tib-fib. Healed remote distal tibia fracture. Marked osteopenia, likely due to disuse.      Ct Abdomen Pelvis W Iv Contrast    Result Date: 4/11/2019  EXAMINATION: CT OF THE ABDOMEN AND PELVIS WITH CONTRAST 4/11/2019 5:14 pm TECHNIQUE: CT of the abdomen and pelvis was performed with the administration of intravenous contrast. Multiplanar reformatted images are provided for review. Dose modulation, iterative reconstruction, and/or weight based adjustment of the mA/kV was utilized to reduce the radiation dose to as low as reasonably achievable. COMPARISON: None. HISTORY: ORDERING SYSTEM PROVIDED HISTORY: Abdominal pain TECHNOLOGIST PROVIDED HISTORY: IV Only Contrast Ordering Physician Provided Reason for Exam: patient c/o abd pain for an hour FINDINGS: Lower Chest: Trace pleural fluid bilaterally is noted. Indwelling cardiac pacemaker is present. Heart size is normal.  Coronary artery calcifications are evident. The esophagus is significantly dilated and fluid-filled. No paraesophageal adenopathy is evident. Organs: The spleen, pancreas, and adrenals are unremarkable. The liver contains hypodensities, likely cysts. The gallbladder is distended and contains a solitary gallstone. The kidneys excrete contrast bilaterally. Extrarenal collecting systems are noted. The ureters are mildly dilated down to the urinary bladder without evidence of intraluminal or ureteral obstructing calculi. GI/Bowel: Marked distention of the stomach is noted; this continues to the pylorus with appearance suggesting partial gastric outlet obstruction. Fluid is present in some small bowel loops and colon distal to the stomach. No small bowel obstruction. Pelvis: Marked distention of the urinary bladder is noted. 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 Distended ureters may be related to reflux or infection. No free pelvic fluid, pelvic or inguinal adenopathy is noted. Peritoneum/Retroperitoneum: No aortic aneurysm. Mild to moderate plaque is noted in the infrarenal abdominal aorta and both proximal iliac arteries. Shotty lymph nodes are present around the aorta in the upper abdomen. No mesenteric adenopathy is noted.  Bones/Soft Tissues: Degenerative changes are present in the hips and lower lumbar facets. Estimated biologic radiation dose for this procedure:258.77 mGy/cm2.     1. Dilatation of the thoracic esophagus filled with fluid. No obstructive process is noted. No adjacent enlarged lymph nodes. 2. Trace pleural fluid. 3. Marked distention of the stomach. This appears to extend to the pylorus. Partial gastric outlet obstruction is not excluded. 4. Bilateral dilated ureters without obstructing calculi. Urinary bladder is markedly distended with bladder wall air suggesting emphysematous cystitis. Dilatation of the ureters may be related to reflux or infection. 5. Atherosclerotic disease. 6. Other findings as above. Critical results were called by Dr. Madeline Case MD to [address] on 4/11/2019 at 17:37. Xr Chest Portable    Result Date: 4/12/2019  EXAMINATION: SINGLE XRAY VIEW OF THE CHEST 4/12/2019 5:26 am COMPARISON: November 14, 2015. HISTORY: ORDERING SYSTEM PROVIDED HISTORY: line placement TECHNOLOGIST PROVIDED HISTORY: line placement Ordering Physician Provided Reason for Exam: New right side line placement. Acuity: Acute Type of Exam: Initial Additional signs and symptoms: New right side line placement. FINDINGS: Stable left pectoral trans venous cardiac pacer device. New right IJ central venous catheter with tip near the superior atrial caval junction. Normal lung volume. No new consolidation. Curvilinear radiopacity projecting over the right upper lobe likely represents artifact from a skin fold. No pleural effusion or pneumothorax. Stable cardiomediastinal silhouette and great vessels with redemonstration of atherosclerotic thoracic aorta. New right IJ central venous catheter with tip near the superior atrial caval junction. No pneumothorax. No new consolidation. Physical Examination:        Physical Exam   Constitutional: She is oriented to person, place, and time. She appears well-developed. She appears cachectic. She appears ill.  No distress. Intubated   HENT:   Head: Normocephalic and atraumatic. Eyes: Pupils are equal, round, and reactive to light. Conjunctivae and EOM are normal.   Neck: Normal range of motion. Right IJ central line in place. Site clean and dry. Cardiovascular: Normal rate, regular rhythm, normal heart sounds and intact distal pulses. Exam reveals no gallop and no friction rub. No murmur heard. Pulmonary/Chest: Effort normal. No stridor. No respiratory distress. She has no wheezes. She has no rales. Intubated and mechanically ventilated. Abdominal: Soft. Bowel sounds are normal. She exhibits no mass. There is no tenderness (Decreased tenderness to light palpation. Improvement from prior. ). There is no rebound and no guarding. Cholecystostomy tube in place. Musculoskeletal: Normal range of motion. She exhibits no edema (Anasarca; 3+ b/l pitting edema in upper and lower ext. ). Left knee wrapped in dressing. Posterior bruising noted. Neurological: She is alert and oriented to person, place, and time. Skin: Skin is warm and dry. Capillary refill takes less than 2 seconds. She is not diaphoretic. No pallor. Nursing note and vitals reviewed. Assessment:        Primary Problem  Sepsis due to urinary tract infection [facility])    Active Hospital Problems    Diagnosis Date Noted    Cholecystitis [K81.9]     Abdominal distention [R14.0]     Elevated CEA [R97.0]     Elevated CA 19-9 level [R97.8]     Urinary retention [R33.9]     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]     Septic shock (HCC) [A41.9, R65.21]     Leukemoid reaction [D72.823]     Aspiration pneumonia of right lower lobe due to vomit ([address] [J69.0]     MRSA carrier [Z22.322]     Sepsis due to urinary tract infection ([address] [A41.9, N39.0] 04/11/2019    Cystitis [N30.90] 04/11/2019       Plan:           Septic shock 2/2 E. Coli Emphysematous Cystitis + MRSA PNA   WBC 12.8   Levaquin, flagyl   Levophed @2   CXR no interval change of LLB, mild Principal Problem:    Sepsis due to urinary tract infection (HCC)  Active Problems:    Cystitis    Emphysematous cystitis    Septic shock (HCC)    Leukemoid reaction    Aspiration pneumonia of right lower lobe due to vomit (HCC)    MRSA carrier    Urinary retention    Abdominal distention    Elevated CEA    Elevated CA 19-9 level    Cholecystitis  Resolved Problems:    * No resolved hospital problems. *            ---patient seen 4/24/19- ;     [address], [address] [place] [address]. Phone [phone]   Fax: [phone]  Answering Service: [phone]              .

## 2019-04-25 LAB
ALBUMIN SERPL-MCNC: 1.6 G/DL (ref 3.5–5.2)
ALBUMIN/GLOBULIN RATIO: ABNORMAL (ref 1–2.5)
ALP BLD-CCNC: 74 U/L (ref 35–104)
ALT SERPL-CCNC: 15 U/L (ref 5–33)
ANION GAP SERPL CALCULATED.3IONS-SCNC: 8 MMOL/L (ref 9–17)
AST SERPL-CCNC: 19 U/L
BILIRUB SERPL-MCNC: 0.5 MG/DL (ref 0.3–1.2)
BUN BLDV-MCNC: 26 MG/DL (ref 8–23)
BUN/CREAT BLD: ABNORMAL (ref 9–20)
C-REACTIVE PROTEIN: 91.7 MG/L (ref 0–5)
CALCIUM SERPL-MCNC: 7.3 MG/DL (ref 8.6–10.4)
CHLORIDE BLD-SCNC: 101 MMOL/L (ref 98–107)
CO2: 27 MMOL/L (ref 20–31)
CORTISOL COLLECTION INFO: NORMAL
CORTISOL: 16.8 UG/DL (ref 2.7–18.4)
CREAT SERPL-MCNC: <0.4 MG/DL (ref 0.5–0.9)
GFR AFRICAN AMERICAN: ABNORMAL ML/MIN
GFR NON-AFRICAN AMERICAN: ABNORMAL ML/MIN
GFR SERPL CREATININE-BSD FRML MDRD: ABNORMAL ML/MIN/{1.73_M2}
GFR SERPL CREATININE-BSD FRML MDRD: ABNORMAL ML/MIN/{1.73_M2}
GLUCOSE BLD-MCNC: 117 MG/DL (ref 70–99)
GLUCOSE BLD-MCNC: 125 MG/DL (ref 65–105)
GLUCOSE BLD-MCNC: 135 MG/DL (ref 65–105)
HCT VFR BLD CALC: 29.1 % (ref 36–46)
HEMOGLOBIN: 9.6 G/DL (ref 12–16)
INR BLD: 1.5
MAGNESIUM: 1.7 MG/DL (ref 1.6–2.6)
MCH RBC QN AUTO: 29.8 PG (ref 26–34)
MCHC RBC AUTO-ENTMCNC: 33 G/DL (ref 31–37)
MCV RBC AUTO: 90.4 FL (ref 80–100)
NRBC AUTOMATED: ABNORMAL PER 100 WBC
PDW BLD-RTO: 15.3 % (ref 11.5–14.9)
PHOSPHORUS: 3.7 MG/DL (ref 2.6–4.5)
PLATELET # BLD: 117 K/UL (ref 150–450)
PMV BLD AUTO: 8.2 FL (ref 6–12)
POTASSIUM SERPL-SCNC: 4 MMOL/L (ref 3.7–5.3)
PROCALCITONIN: 0.26 NG/ML
PROTHROMBIN TIME: 18 SEC (ref 11.8–14.6)
RBC # BLD: 3.22 M/UL (ref 4–5.2)
SEDIMENTATION RATE, ERYTHROCYTE: 47 MM (ref 0–20)
SODIUM BLD-SCNC: 136 MMOL/L (ref 135–144)
TOTAL PROTEIN: 4.1 G/DL (ref 6.4–8.3)
WBC # BLD: 13.6 K/UL (ref 3.5–11)

## 2019-04-25 PROCEDURE — 6360000002 HC RX W HCPCS: Performed by: INTERNAL MEDICINE

## 2019-04-25 PROCEDURE — 2580000003 HC RX 258: Performed by: STUDENT IN AN ORGANIZED HEALTH CARE EDUCATION/TRAINING PROGRAM

## 2019-04-25 PROCEDURE — 2000000000 HC ICU R&B

## 2019-04-25 PROCEDURE — 36592 COLLECT BLOOD FROM PICC: CPT

## 2019-04-25 PROCEDURE — 86022 PLATELET ANTIBODIES: CPT

## 2019-04-25 PROCEDURE — 6370000000 HC RX 637 (ALT 250 FOR IP): Performed by: STUDENT IN AN ORGANIZED HEALTH CARE EDUCATION/TRAINING PROGRAM

## 2019-04-25 PROCEDURE — 2580000003 HC RX 258: Performed by: INTERNAL MEDICINE

## 2019-04-25 PROCEDURE — 84100 ASSAY OF PHOSPHORUS: CPT

## 2019-04-25 PROCEDURE — 6360000002 HC RX W HCPCS: Performed by: STUDENT IN AN ORGANIZED HEALTH CARE EDUCATION/TRAINING PROGRAM

## 2019-04-25 PROCEDURE — 94640 AIRWAY INHALATION TREATMENT: CPT

## 2019-04-25 PROCEDURE — 83735 ASSAY OF MAGNESIUM: CPT

## 2019-04-25 PROCEDURE — 85651 RBC SED RATE NONAUTOMATED: CPT

## 2019-04-25 PROCEDURE — 84145 PROCALCITONIN (PCT): CPT

## 2019-04-25 PROCEDURE — 97110 THERAPEUTIC EXERCISES: CPT

## 2019-04-25 PROCEDURE — 80053 COMPREHEN METABOLIC PANEL: CPT

## 2019-04-25 PROCEDURE — 6370000000 HC RX 637 (ALT 250 FOR IP): Performed by: INTERNAL MEDICINE

## 2019-04-25 PROCEDURE — 2700000000 HC OXYGEN THERAPY PER DAY

## 2019-04-25 PROCEDURE — 85027 COMPLETE CBC AUTOMATED: CPT

## 2019-04-25 PROCEDURE — 2500000003 HC RX 250 WO HCPCS: Performed by: INTERNAL MEDICINE

## 2019-04-25 PROCEDURE — 86140 C-REACTIVE PROTEIN: CPT

## 2019-04-25 PROCEDURE — 99233 SBSQ HOSP IP/OBS HIGH 50: CPT | Performed by: INTERNAL MEDICINE

## 2019-04-25 PROCEDURE — 82947 ASSAY GLUCOSE BLOOD QUANT: CPT

## 2019-04-25 PROCEDURE — 2500000003 HC RX 250 WO HCPCS: Performed by: STUDENT IN AN ORGANIZED HEALTH CARE EDUCATION/TRAINING PROGRAM

## 2019-04-25 PROCEDURE — C9113 INJ PANTOPRAZOLE SODIUM, VIA: HCPCS | Performed by: INTERNAL MEDICINE

## 2019-04-25 PROCEDURE — 85610 PROTHROMBIN TIME: CPT

## 2019-04-25 PROCEDURE — 82533 TOTAL CORTISOL: CPT

## 2019-04-25 PROCEDURE — 2500000003 HC RX 250 WO HCPCS: Performed by: SURGERY

## 2019-04-25 PROCEDURE — 36415 COLL VENOUS BLD VENIPUNCTURE: CPT

## 2019-04-25 RX ORDER — PREDNISONE 20 MG/1
20 TABLET ORAL DAILY
Status: DISCONTINUED | OUTPATIENT
Start: 2019-04-25 | End: 2019-05-02

## 2019-04-25 RX ADMIN — IPRATROPIUM BROMIDE 0.5 MG: 0.5 SOLUTION RESPIRATORY (INHALATION) at 03:56

## 2019-04-25 RX ADMIN — LEVALBUTEROL 1.25 MG: 1.25 SOLUTION, CONCENTRATE RESPIRATORY (INHALATION) at 11:37

## 2019-04-25 RX ADMIN — DILTIAZEM HYDROCHLORIDE 30 MG: 30 TABLET, FILM COATED ORAL at 06:03

## 2019-04-25 RX ADMIN — FUROSEMIDE 40 MG: 10 INJECTION, SOLUTION INTRAMUSCULAR; INTRAVENOUS at 08:11

## 2019-04-25 RX ADMIN — METRONIDAZOLE 500 MG: 500 INJECTION, SOLUTION INTRAVENOUS at 04:46

## 2019-04-25 RX ADMIN — PANTOPRAZOLE SODIUM 40 MG: 40 INJECTION, POWDER, FOR SOLUTION INTRAVENOUS at 08:12

## 2019-04-25 RX ADMIN — LEVALBUTEROL 1.25 MG: 1.25 SOLUTION, CONCENTRATE RESPIRATORY (INHALATION) at 03:56

## 2019-04-25 RX ADMIN — VENLAFAXINE 37.5 MG: 37.5 TABLET ORAL at 15:08

## 2019-04-25 RX ADMIN — PREDNISONE 20 MG: 20 TABLET ORAL at 15:08

## 2019-04-25 RX ADMIN — IPRATROPIUM BROMIDE 0.5 MG: 0.5 SOLUTION RESPIRATORY (INHALATION) at 11:36

## 2019-04-25 RX ADMIN — NOREPINEPHRINE BITARTRATE 5 MCG/MIN: 1 INJECTION INTRAVENOUS at 01:30

## 2019-04-25 RX ADMIN — AZTREONAM 1 G: 1 INJECTION, POWDER, LYOPHILIZED, FOR SOLUTION INTRAMUSCULAR; INTRAVENOUS at 22:22

## 2019-04-25 RX ADMIN — I.V. FAT EMULSION 100 ML: 20 EMULSION INTRAVENOUS at 18:27

## 2019-04-25 RX ADMIN — METOCLOPRAMIDE 10 MG: 5 INJECTION, SOLUTION INTRAMUSCULAR; INTRAVENOUS at 08:12

## 2019-04-25 RX ADMIN — METHYLPREDNISOLONE SODIUM SUCCINATE 20 MG: 40 INJECTION, POWDER, FOR SOLUTION INTRAMUSCULAR; INTRAVENOUS at 08:12

## 2019-04-25 RX ADMIN — METRONIDAZOLE 500 MG: 500 INJECTION, SOLUTION INTRAVENOUS at 13:52

## 2019-04-25 RX ADMIN — METOCLOPRAMIDE 10 MG: 5 INJECTION, SOLUTION INTRAMUSCULAR; INTRAVENOUS at 20:17

## 2019-04-25 RX ADMIN — AZTREONAM 1 G: 1 INJECTION, POWDER, LYOPHILIZED, FOR SOLUTION INTRAMUSCULAR; INTRAVENOUS at 15:08

## 2019-04-25 RX ADMIN — METOCLOPRAMIDE 10 MG: 5 INJECTION, SOLUTION INTRAMUSCULAR; INTRAVENOUS at 13:52

## 2019-04-25 RX ADMIN — IPRATROPIUM BROMIDE 0.5 MG: 0.5 SOLUTION RESPIRATORY (INHALATION) at 20:03

## 2019-04-25 RX ADMIN — CALCIUM GLUCONATE: 94 INJECTION, SOLUTION INTRAVENOUS at 18:34

## 2019-04-25 RX ADMIN — AZTREONAM 1 G: 1 INJECTION, POWDER, LYOPHILIZED, FOR SOLUTION INTRAMUSCULAR; INTRAVENOUS at 05:58

## 2019-04-25 RX ADMIN — METRONIDAZOLE 500 MG: 500 INJECTION, SOLUTION INTRAVENOUS at 20:17

## 2019-04-25 RX ADMIN — Medication 10 ML: at 08:12

## 2019-04-25 RX ADMIN — LEVALBUTEROL 1.25 MG: 1.25 SOLUTION, CONCENTRATE RESPIRATORY (INHALATION) at 20:03

## 2019-04-25 RX ADMIN — METOCLOPRAMIDE 10 MG: 5 INJECTION, SOLUTION INTRAMUSCULAR; INTRAVENOUS at 02:12

## 2019-04-25 RX ADMIN — DILTIAZEM HYDROCHLORIDE 30 MG: 30 TABLET, FILM COATED ORAL at 01:28

## 2019-04-25 RX ADMIN — SODIUM CHLORIDE: 9 INJECTION, SOLUTION INTRAVENOUS at 01:29

## 2019-04-25 RX ADMIN — CLOPIDOGREL BISULFATE 75 MG: 75 TABLET ORAL at 08:11

## 2019-04-25 RX ADMIN — ASPIRIN 81 MG: 81 TABLET, COATED ORAL at 08:11

## 2019-04-25 RX ADMIN — VENLAFAXINE 37.5 MG: 37.5 TABLET ORAL at 20:18

## 2019-04-25 RX ADMIN — VENLAFAXINE 37.5 MG: 37.5 TABLET ORAL at 08:14

## 2019-04-25 RX ADMIN — ENOXAPARIN SODIUM 40 MG: 100 INJECTION SUBCUTANEOUS at 08:11

## 2019-04-25 RX ADMIN — IPRATROPIUM BROMIDE 0.5 MG: 0.5 SOLUTION RESPIRATORY (INHALATION) at 15:15

## 2019-04-25 RX ADMIN — ACETAMINOPHEN 650 MG: 325 TABLET, FILM COATED ORAL at 15:08

## 2019-04-25 RX ADMIN — LEVALBUTEROL 1.25 MG: 1.25 SOLUTION, CONCENTRATE RESPIRATORY (INHALATION) at 15:15

## 2019-04-25 RX ADMIN — SODIUM CHLORIDE: 9 INJECTION, SOLUTION INTRAVENOUS at 17:14

## 2019-04-25 ASSESSMENT — PAIN SCALES - GENERAL
PAINLEVEL_OUTOF10: 0
PAINLEVEL_OUTOF10: 6
PAINLEVEL_OUTOF10: 0

## 2019-04-25 ASSESSMENT — ENCOUNTER SYMPTOMS
NAUSEA: 0
DIARRHEA: 0
CONSTIPATION: 0
COUGH: 0
VOMITING: 0
SHORTNESS OF BREATH: 0
ABDOMINAL PAIN: 0
EYE REDNESS: 0
SORE THROAT: 0

## 2019-04-25 ASSESSMENT — PAIN DESCRIPTION - ORIENTATION: ORIENTATION: LEFT

## 2019-04-25 ASSESSMENT — PAIN DESCRIPTION - LOCATION: LOCATION: LEG

## 2019-04-25 NOTE — PLAN OF CARE
Nutrition Problem: Inadequate oral intake  Intervention: Food and/or Nutrient Delivery: Continue current diet, Continue current ONS, Modify current Parenteral Nutrition  Nutritional Goals: PN to meet greater 90% of estimated nutrition needs

## 2019-04-25 NOTE — FLOWSHEET NOTE
Patient discouraged by Spike Shock long it is taking to get better\"; listening presence     04/25/19 1535   Encounter Summary   Services provided to: Patient   Referral/Consult From: Rounding   Continue Visiting   (4/25/19)   Complexity of Encounter Moderate   Length of Encounter 15 minutes   Spiritual Assessment Completed Yes   Routine   Type Follow up   Spiritual/Catholic   Type Spiritual support   Assessment Approachable; Anxious; Hopeful;Coping   Intervention Active listening;Prayer;Nurtured hope;Sustaining presence/ Ministry of presence; Discussed illness/injury and it's impact   Outcome Expressed gratitude;Engaged in conversation;Expressed feelings/needs/concerns;Coping; Hopeful;Receptive

## 2019-04-25 NOTE — PROGRESS NOTES
[facility] [address]   INPATIENT OCCUPATIONAL THERAPY  PROGRESS NOTE  Date: 2019  Patient Name: Erin Damon      Room:   MRN: [id number]    : 1950  (77 y.o.) Gender: female     Discharge Recommendations:  [address]       Referring Practitioner: Ambreen Alvarez MD  Diagnosis: Sepsis due to UTI  General  Chart Reviewed: Yes  Patient assessed for rehabilitation services?: Yes  Referring Practitioner: Ambreen Alvarez MD  Diagnosis: Sepsis due to UTI    Restrictions  Restrictions/Precautions: Fall Risk  Implants present? : Pacemaker  Other position/activity restrictions: LLE in a long leg cast      Subjective \"I can't move my arm at all (LUE)\"  Patient Currently in Pain: Yes  Pain Location: Leg  Pain Orientation: Left        Pain Assessment  Pain Level: 10  Pain Type: Chronic pain(general pain )  Pain Location: Leg  Pain Orientation: Left    Objective Patient could not hold RUE against gravity. Initially stated would try to sit EOB but pain level too high and no noted movement in extremities. Removed compression glove from left hand noting  decreased edema. Positioned for comfort. Continue POC                             Type of ROM/Therapeutic Exercise  Type of ROM/Therapeutic Exercise: Self PROM;PROM  Exercises  Shoulder Elevation: x  Shoulder Flexion: x  Elbow Flexion: x  Elbow Extension: x  Wrist Flexion: x  Wrist Extension: x  Finger Flexion: x  Finger Extension: x  Grasp/Release: x  Other: LUE                           Assessment  Performance deficits / Impairments: Decreased functional mobility ; Decreased ADL status; Decreased ROM; Decreased strength;Decreased endurance;Decreased coordination  Prognosis: Fair  Activity Tolerance: Patient limited by fatigue;Patient limited by pain  Safety Devices in place: Yes  Type of devices: Left in bed;Call light within reach; Patient at risk for falls             Patient Education: Learner:patient  Method: explanation       Outcome: needs reinforcement     Plan  Safety Devices  Safety Devices in place: Yes  Type of devices: Left in bed, Call light within reach, Patient at risk for falls  Plan  Times per week: 5  Times per day: Daily  Current Treatment Recommendations: ROM, Strengthening, Patient/Caregiver Education & Training, Self-Care / ADL, Safety Education & Training, Functional Mobility Training, Equipment Evaluation, Education, & procurement  Plan Comment: continue Ot      Goals  Short term goals  Time Frame for Short term goals: by discharge  Short term goal 1: pt will participate in 15-20 minutes bilateral UE therapeutic exercises to improve rt UE strength,increase lt UE (rom,coordination,decrease swelling)  Short term goal 2: pt will demonstrates increase indpendence w ADLS (needing min assist w grooming, UB bathing and dressing) using pacing/ECWS technique  Short term goal 3: Improve bed mobility for selfcare activities  Short term goal 4: Educate pt in ECWS techniques for selfcare  Short term goal 5: Assess safety w transfers  & progress to lower body ADLS when pt is appropriate    OT Individual Minutes  Time In: 1410  Time Out: [address]  Minutes: 15      Electronically signed by KORINA Perry on 4/25/19 at 2:55 PM

## 2019-04-25 NOTE — PROGRESS NOTES
[address] [facility].    PROGRESS NOTE             4/25/2019    7:47 AM    Name:   Leno Sullivan  MRN:     [id number]     Acct:      [de-identified]   Room:   2002/2002-01  IP Day:  15  Admit Date:  4/11/2019  2:48 PM    PCP:  Taylor Eubanks DO  Code Status:  Full Code    Subjective: Interval History Status: improved. Patient seen and examined at bedside in the ICU. Patient clinically improved since yesterday. Denies any complaints overnight. On Levophed @ 4. Amio switched to cardizem. HR 90s-107. Will continue to monitor. No acute events overnight per Nurse Sahara. Brief History:     Per EMR:     The patient is a 77 y.o.  Female who presents withAbdominal Pain   and she is admitted to the hospital for the management of abdominal distension, nausea, vomiting, and acute cystitis.      Patient presents with chills, nausea, vomiting, abdominal pain (diffuse, but worse in the suprapubic region), abdominal distension, and dysuria of 2-3 days duration. Denies hematemesis. Acknowledges difficulty keeping food down but tolerating fluids. States abdominal pain is a 6/10 on average, and denies trying any medication to alleviate her sx.      Denies recent antibiotic use or steroid use.      ED: Tachycardic 100-114 BP, WBC 47.9 w/neutrophils 88,  UCx from 4/2 + E. Coli > 100,000 w/suceptibility to cipro. Review of Systems:     Review of Systems   Constitutional: Negative for chills and fever. HENT: Negative for sore throat. Eyes: Negative for redness. Respiratory: Negative for cough and shortness of breath. Cardiovascular: Negative for chest pain and leg swelling. Gastrointestinal: Negative for abdominal pain, constipation, diarrhea, nausea and vomiting. Genitourinary: Negative for dysuria and hematuria. Musculoskeletal: Negative for arthralgias. Skin: Negative for rash. Neurological: Negative for headaches. Hematological: Negative for adenopathy. Medications: Allergies: Allergies   Allergen Reactions    Penicillins Shortness Of Breath and Rash    Amoxicillin        Current Meds:   Scheduled Meds:    methylPREDNISolone  20 mg Intravenous Daily    aztreonam  1 g Intravenous Q8H    metoclopramide  10 mg Intravenous Q6H    diltiazem  30 mg Oral 4 times per day    fat emulsion  100 mL Intravenous Daily    furosemide  40 mg Intravenous Daily    metroNIDAZOLE  500 mg Intravenous Q8H    magnesium sulfate  1 g Intravenous Once    pantoprazole  40 mg Intravenous Daily    And    sodium chloride (PF)  10 mL Intravenous Daily    sodium chloride  250 mL Intravenous Once    ipratropium  0.5 mg Nebulization Q4H    insulin lispro  0-12 Units Subcutaneous Q6H    levalbuterol  1.25 mg Nebulization Q4H    venlafaxine  37.5 mg Oral TID    clopidogrel  75 mg Oral Daily    aspirin  81 mg Oral Daily    sodium chloride flush  10 mL Intravenous 2 times per day    enoxaparin  40 mg Subcutaneous Daily     Continuous Infusions:    PN-Adult 2-in-1 Central Line (Standard) 65 mL/hr at 04/24/19 1938    sodium chloride 75 mL/hr at 04/25/19 0129    norepinephrine 5 mcg/min (04/25/19 0130)    dextrose       PRN Meds: hydrOXYzine, sodium chloride flush, metoprolol, sodium chloride nebulizer, fentanNYL, oxyCODONE-acetaminophen, magnesium sulfate, sodium phosphate IVPB **OR** sodium phosphate IVPB, potassium chloride **OR** potassium alternative oral replacement **OR** potassium chloride, glucose, dextrose, glucagon (rDNA), dextrose, milk and molasses, sodium chloride flush, acetaminophen    Data:     Past Medical History:   has a past medical history of Abnormal computed tomography of cervical spine, CVA (cerebral vascular accident) ([address] GERD (gastroesophageal reflux disease), Hypertension, Paraproteinemia, and Weight loss. Social History:   reports that she has been smoking.   She has a 30.00 pack-year smoking history. She has never used smokeless tobacco. She reports that she drank about 16.8 oz of alcohol per week. She reports that she does not use drugs. Family History: History reviewed. No pertinent family history. Vitals:  BP (!) 110/47   Pulse 92   Temp 97.7 °F (36.5 °C) (Axillary)   Resp 19   Ht 5' (1.524 m)   Wt 132 lb 7.9 oz (60.1 kg)   SpO2 96%   BMI 25.88 kg/m²   Temp (24hrs), Av.1 °F (36.7 °C), Min:97.7 °F (36.5 °C), Max:98.4 °F (36.9 °C)    Recent Labs     19  0448 19  1707 19  2104 19  0452   POCGLU 135* 107* 117* 125*       I/O(24Hr): Intake/Output Summary (Last 24 hours) at 2019 0747  Last data filed at 2019 0600  Gross per 24 hour   Intake 1587.82 ml   Output 3570 ml   Net -1982.18 ml       Labs:    [unfilled]    Lab Results   Component Value Date/Time    SPECIAL NOT REPORTED 2019 10:10 PM     Lab Results   Component Value Date/Time    CULTURE CULTURE IN PROGRESS 2019 04:59 PM       [unfilled]    Radiology:    Shabnam Cabrera Femur Left (min 2 Views)    Result Date: 3/27/2019  EXAMINATION: 4 XRAY VIEWS OF THE LEFT FEMUR; 2 XRAY VIEWS OF THE LEFT KNEE; 3 XRAY VIEWS OF THE LEFT TIBIA AND FIBULA 3/27/2019 7:00 pm COMPARISON: Left hip 2015. HISTORY: ORDERING SYSTEM PROVIDED HISTORY: pain TECHNOLOGIST PROVIDED HISTORY: pain Ordering Physician Provided Reason for Exam: pt twisted wrong, lt knee pain, unable to straighten knee. Acuity: Acute Type of Exam: Initial FINDINGS: Left femur, four views: The bones are diffusely osteopenic. No acute fracture deformity. The hip joint is maintained. The femoral head projects within the acetabulum. No focal soft tissue abnormality is seen. Left knee, two views: The knee is flexed. No acute osseous abnormality. No joint effusion. Joint spaces are not optimally profiled but no significant arthritic changes are apparent. Left tib-fib, three views: There is evidence of a remote healed distal tibia fracture. distal pulses. Exam reveals no gallop and no friction rub. No murmur heard. Pulmonary/Chest: Effort normal and breath sounds normal. No stridor. No respiratory distress. She has no wheezes. She has no rales. Intubated and mechanically ventilated. Abdominal: Soft. Bowel sounds are normal. She exhibits no distension and no mass. There is no tenderness (Decreased tenderness to light palpation. Improvement from prior. ). There is no rebound and no guarding. Cholecystostomy tube in place. Musculoskeletal: Normal range of motion. She exhibits no edema (Anasarca; 3+ b/l pitting edema in upper and lower ext. ). Left knee wrapped in dressing. Posterior bruising noted. Neurological: She is alert. Skin: Skin is warm and dry. Capillary refill takes less than 2 seconds. She is not diaphoretic. No erythema. No pallor. Nursing note and vitals reviewed. Assessment:        Primary Problem  Sepsis due to urinary tract infection [facility])    Active Hospital Problems    Diagnosis Date Noted    Cholecystitis [K81.9]     Abdominal distention [R14.0]     Elevated CEA [R97.0]     Elevated CA 19-9 level [R97.8]     Urinary retention [R33.9]     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]     Septic shock (HCC) [A41.9, R65.21]     Leukemoid reaction [D72.823]     Aspiration pneumonia of right lower lobe due to vomit ([address] [J69.0]     MRSA carrier [Z22.322]     Sepsis due to urinary tract infection ([facility] [address] [A41.9, N39.0] 04/11/2019    Cystitis [N30.90] 04/11/2019       Plan:           Septic shock 2/2 E. Coli Emphysematous Cystitis + MRSA PNA   WBC 12.8 --> 13.6   Levaquin switched to Aztreonam 2/2 prolonged QT   Flagyl   Levophed @4   F/U CXR   ECHO 4/12: LVEF 45%, RVSP 36 mmHg   Urology consult, appreciate recs --> cont cath until out of ICU and stable   Critical care consult, appreciate recs   Gen Surg, right IJ central line placed on 4/12   ID consult, appreciate recs   Cardizem DC'd   Lasix HELD   F/U Cortisol

## 2019-04-25 NOTE — PROGRESS NOTES
ICU Progress Note (Non-Vent)  [facility]    Patient - Sukhjinder Reilly,  Age - 77 y.o.    - 1950      Room Number -    N -  [id number]   [facility]  Date of Admission -  2019  2:48 PM    Follow-up: urosepsis, septic shock/hypotension, acute respiratory failure     Events of Past 24 Hours   Extubated . on 2 L via NC. Still on Levophed drip   On TPN   Denies short of breath cough or wheezing  No chest pain  Still with some abdominal pain    Vitals    height is 5' (1.524 m) and weight is 132 lb 7.9 oz (60.1 kg). Her oral temperature is 99.5 °F (37.5 °C). Her blood pressure is 118/67 and her pulse is 107. Her respiration is 23 and oxygen saturation is 93%.        Temperature Range: Temp: 99.5 °F (37.5 °C) Temp  Av.3 °F (36.8 °C)  Min: 97.7 °F (36.5 °C)  Max: 99.5 °F (37.5 °C)  BP Range:  Systolic (94TJI), EOL:296 , Min:73 , SQM:324     Diastolic (57MBO), QET:06, Min:28, Max:67    Pulse Range: Pulse  Av.6  Min: 92  Max: 113  Respiration Range: Resp  Av.2  Min: 16  Max: 29  Current Pulse Ox[de-identified]  SpO2: 93 %  24HR Pulse Ox Range:  SpO2  Av.7 %  Min: 89 %  Max: 100 %  Oxygen Amount and Delivery: O2 Flow Rate (L/min): 2 L/min    Wt Readings from Last 3 Encounters:   19 132 lb 7.9 oz (60.1 kg)   19 89 lb (40.4 kg)   19 94 lb 1.6 oz (42.7 kg)     I/O       Intake/Output Summary (Last 24 hours) at 2019 1052  Last data filed at 2019 0600  Gross per 24 hour   Intake 1587.82 ml   Output 2190 ml   Net -602.18 ml     DRAIN/TUBE OUTPUT       Invasive Lines   Right IJ day 12     Medications      methylPREDNISolone  20 mg Intravenous Daily    aztreonam  1 g Intravenous Q8H    metoclopramide  10 mg Intravenous Q6H    fat emulsion  100 mL Intravenous Daily    [Held by provider] furosemide  40 mg Intravenous Daily    metroNIDAZOLE  500 mg Intravenous Q8H    magnesium sulfate  1 g Intravenous Once    pantoprazole  40 mg Intravenous Daily    And    sodium chloride (PF)  10 mL Intravenous Daily    sodium chloride  250 mL Intravenous Once    ipratropium  0.5 mg Nebulization Q4H    insulin lispro  0-12 Units Subcutaneous Q6H    levalbuterol  1.25 mg Nebulization Q4H    venlafaxine  37.5 mg Oral TID    clopidogrel  75 mg Oral Daily    aspirin  81 mg Oral Daily    sodium chloride flush  10 mL Intravenous 2 times per day    enoxaparin  40 mg Subcutaneous Daily     hydrOXYzine, sodium chloride flush, metoprolol, sodium chloride nebulizer, fentanNYL, oxyCODONE-acetaminophen, magnesium sulfate, sodium phosphate IVPB **OR** sodium phosphate IVPB, potassium chloride **OR** potassium alternative oral replacement **OR** potassium chloride, glucose, dextrose, glucagon (rDNA), dextrose, milk and molasses, sodium chloride flush, acetaminophen  IV Drips/Infusions   PN-Adult 2-in-1 Central Line (Standard) 65 mL/hr at 04/24/19 1938    sodium chloride 75 mL/hr at 04/25/19 0129    norepinephrine 2 mcg/min (04/25/19 1030)    dextrose         Diet/Nutrition   DIET CLEAR LIQUID;  PN-Adult 2-in-1 Central Line (Standard)  Dietary Nutrition Supplements: Clear Liquid Oral Supplement    Exam      Constitutional - Alert, awake ,   General Appearance developed, no distress  HEENT - normocephalic, atraumatic. Lungs -  Coarse, decreased breath sounds. Chest expands equally, no wheezes, rales or rhonchi. Cardiovascular -  Heart sounds are normal.  normal rate and rhythm regular, no murmur, gallop or rub. Abdomen - cholecystectomy tube in place. soft, tender at the incision, nondistended  Neurologic - hx of CVA with old left sided hemiparesis. following commands    Skin - no bruising or bleeding  Extremities - LE bilateral pitting edema ; left UE edema.  no cyanosis, clubbing         Lab Results   CBC     Lab Results   Component Value Date    WBC 13.6 04/25/2019    RBC 3.22 04/25/2019    RBC 3.66 05/23/2012    HGB 9.6 04/25/2019    HCT 29.1 04/25/2019     04/25/2019     05/23/2012    MCV 90.4 04/25/2019    MCH 29.8 04/25/2019    MCHC 33.0 04/25/2019    RDW 15.3 04/25/2019    METASPCT 2 04/11/2019    LYMPHOPCT 4 04/11/2019    MONOPCT 2 04/11/2019    BASOPCT 0 04/11/2019    MONOSABS 0.96 04/11/2019    LYMPHSABS 1.92 04/11/2019    EOSABS 0.00 04/11/2019    BASOSABS 0.00 04/11/2019    DIFFTYPE NOT REPORTED 04/11/2019       BMP   Lab Results   Component Value Date     04/25/2019    K 4.0 04/25/2019     04/25/2019    CO2 27 04/25/2019    BUN 26 04/25/2019    CREATININE <0.40 04/25/2019    GLUCOSE 117 04/25/2019    GLUCOSE 87 05/21/2012       LFTS  Lab Results   Component Value Date    ALKPHOS 74 04/25/2019    ALT 15 04/25/2019    AST 19 04/25/2019    PROT 4.1 04/25/2019    BILITOT 0.50 04/25/2019    BILIDIR 0.20 04/18/2019    IBILI 0.26 04/18/2019    LABALBU 1.6 04/25/2019    LABALBU 4.6 05/17/2012       ABG ABGs:   Lab Results   Component Value Date    PHART 7.519 04/19/2019    PO2ART 73.3 04/19/2019    RSM1IUB 42.5 04/19/2019       Lab Results   Component Value Date    MODE PRVC 04/19/2019         INR  Recent Labs     04/23/19  0400 04/24/19  0422 04/25/19  0427   PROTIME 16.5* 16.3* 18.0*   INR 1.3 1.3 1.5       APTT  No results for input(s): APTT in the last 72 hours. Lactic Acid  Lab Results   Component Value Date    LACTA 1.5 04/24/2019    LACTA 2.1 04/14/2019    LACTA 1.5 04/12/2019        BNP   No results for input(s): BNP in the last 72 hours. Cultures       Radiology         CT Scans    (See actual reports for details)      SYSTEMS ASSESSMENT    Neuro     Hx of CVA  - chronic left hemiparesis    Respiratory   Acute hypoxic respiratory failure  - extubated 4/19  -  on 2L NC. Wean oxygen as tolerated.  Keep O2 sat > 88%, no home O2     Bilateral pleural effusions (L > R)  - Negative fluid balance of 1982 mL over past 24hr CVP was low yesterday at 3, on 100 mL per hour of normal saline     Hx COPD   - continue xopenex and Atrovent   - change Solu-Medrol to prednisone      Cardiovascular   Septic shock/hypotenision  - on levophed @ 3. Titrate to MAP > 65. Wean as tolerated. 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  - IR placed cholecystostomy on 4/22/19.   - fluid analysis negative at this time. Cultures negative so far  - on Azactam and Flagyl      GI bleed  - episode of coffee ground emesis. Stool hemoccult +.  - Hgb 9.6  - gen surg following  . Renal       Infectious Disease   Urosepsis  - + for E.  Coli  - ID following     Since admission  + MRSA nasal swab culture  + budding yeast from sputum      Hematology/Oncology   Anemia  - Hgb 9.6     Thrombocytopenia  - platelets down to 304, was as high as 273  - on Lovenox, will check Hit antibody     DVT prophylaxis  - on lovenox     Endocrine       Social/Spiritual/DNR/Disposition/Other     Discussed with staff    Electronically signed by Ron Llamas MD on 4/25/2019 at 10:52 AM

## 2019-04-25 NOTE — CARE COORDINATION
ONGOING DISCHARGE PLAN:    Reviewed patients chart regarding discharge plan and chart still confirms the plan is to discharge to [facility]  Per Pulm      Hx of CVA  - chronic left hemiparesis     Respiratory   Acute hypoxic respiratory failure  - extubated 4/19  -  on 2L NC. Wean oxygen as tolerated. Keep O2 sat > 88%, no home O2     Bilateral pleural effusions (L > R)  - Negative fluid balance of 1982 mL over past 24hr CVP was low yesterday at 3, on 100 mL per hour of normal saline     Hx COPD   - continue xopenex and Atrovent   - change Solu-Medrol to prednisone        Cardiovascular   Septic shock/hypotenision  - on levophed @ 3. Titrate to MAP > 65. Wean as tolerated. 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  - IR placed cholecystostomy on 4/22/19.   - fluid analysis negative at this time. Cultures negative so far  - on Azactam and Flagyl      GI bleed  - episode of coffee ground emesis. Stool hemoccult +.  - Hgb 9.6  - gen surg following  .        Renal         Infectious Disease   Urosepsis  - + for E. Coli  - ID following     Since admission  + MRSA nasal swab culture  + budding yeast from sputum        Hematology/Oncology   Anemia  - Hgb 9.6     Thrombocytopenia  - platelets down to 353, was as high as 273  - on Lovenox, will check Hit antibody     DVT prophylaxis  - on lovenox           Will review plan with patient and or family       Will continue to follow for additional discharge needs.      Electronically signed by Sandi De Souza RN on 4/25/2019 at 12:56 PM

## 2019-04-25 NOTE — PROGRESS NOTES
Physical Therapy  Facility/Department: [facility] ICU  Daily Treatment Note  NAME: Angelika Jay  : 1950  MRN: [id number]    Date of Service: 2019    Discharge Recommendations:  ECF with PT        Patient Diagnosis(es): The primary encounter diagnosis was Urinary retention. Diagnoses of Emphysematous cystitis and Septicemia ([facility] [address] were also pertinent to this visit. has a past medical history of Abnormal computed tomography of cervical spine, CVA (cerebral vascular accident) ([facility] [address] GERD (gastroesophageal reflux disease), Hypertension, Paraproteinemia, and Weight loss. has a past surgical history that includes pacemaker placement (2011); intubation (2019); and Upper gastrointestinal endoscopy (N/A, 2019). Restrictions  Restrictions/Precautions  Restrictions/Precautions: Fall Risk  Implants present? : Pacemaker  Position Activity Restriction  Other position/activity restrictions: LLE in a long leg cast  Subjective   General  Family / Caregiver Present: Yes(Caregiver in home for last 5 years Daril Medicus present at bedside)  Subjective  Subjective: patient initially refused therapy; encouragement regarding mobility and progress for home discharge; caregiver also providing ed on need to participate. General Comment  Comments: Caregiver in home for last 5 years Daril Medicus present at bedside; discussed level of care to return to home is wheelchair level independent; Discussed with Daril Medicus regarding patient use of own wheelchair at bedside when ready for transfers and mobility, caregiver agreeable just let her know. Objective    PROM/AAROM to B UE & R LE, 2x10 or 20x each                                       Assessment      Activity Tolerance  Activity Tolerance: Patient limited by pain;self limiting patient reports \"I'm done\" after completing a few exercises on one extremity.  Needs max encouragement to participate  G-Code     OutComes Score                                                  AM-PAC Score             Goals  Short term goals  Time Frame for Short term goals: 10 visits  Short term goal 1: improve strength RUE/RLE to 4/5 to assist in bed mobility and transfers  Short term goal 2: improve bed mobility to minx1  Short term goal 3: improve transfers to minx1  Short term goal 4: progress to Gait and/or use of wheelchair for mobility  Patient Goals   Patient goals : return home    Plan    Plan  Times per week: 5-7x/wk  Times per day: Daily  Specific instructions for Next Treatment: progress to mobility as able  Current Treatment Recommendations: ROM, Strengthening, Functional Mobility Training, Transfer Training  Plan Comment: to continue PT per POC  Safety Devices  Type of devices: Call light within reach, Left in bed  Restraints  Initially in place: Yes  Restraints: B wrist restraint     Therapy Time   Individual Concurrent Group Co-treatment   Time In 0845         Time Out 0915         Minutes 30                 Jefron Bai, PTA

## 2019-04-25 NOTE — PLAN OF CARE
Problem: Risk for Impaired Skin Integrity  Goal: Tissue integrity - skin and mucous membranes  Description  Structural intactness and normal physiological function of skin and  mucous membranes. 4/25/2019 1646 by Edilma Santacruz RN  Outcome: Ongoing  Skin integrity maintained. Patient turned and repositioned. Problem: Falls - Risk of:  Goal: Will remain free from falls  Description  Will remain free from falls  Outcome: Ongoing  No falls this shift. Uses call light for assistance. Problem: Pain:  Goal: Pain level will decrease  Description  Pain level will decrease  Outcome: Ongoing  Pain assessed and medication given PRN.

## 2019-04-25 NOTE — PROGRESS NOTES
Infectious disease Consult Note      Patient: Nimo Melvin  : 1950  Acct#:  [id number]     Date:  2019    Assessment:     Cholecystitis status post cholecystectomy tube  no growth on  fluid so far. Ecoli Emphysematous cystitis treated     Aspiration pneumonia of right lower lobe treated    Sepsis /Septic shock     Yeast growth on sputum culture suspect contamination     Leukocytosis improved    Acute hypoxic resp failure     MRSA carrier    Urinary retention ,Tran cath in place     Anemia      PCN allergy                 Recommendations:       Continue IV Flagyl and aztreonam  Follow GB fluid cultures and adjust antibiotics if needed no growth to date . Ruth Rebeca Follow CBC , renal function closely . Continue supportive care . Subjective:       History of Present Illness  Patient is a 77 y.o.  female admitted with Sepsis due to urinary tract infection   who is seen in consult for the same . She presented w dysuria and lower abd pain for around a week associated with vomiting ,was found hypotensive with leukocytosis . CT suggested emphysematous cystitis,possible gastric outlet obstruction. CXR showed a right lower infiltrate. High CA-19-9,CEA  Allergy to [facility] w SOB   Urine culture  grew ESCHERICHIA COLI resistant to Ampicillin ,sensitive to all others tested ABXS . Blood cultures negative . Mycoplasma IGM 0.97   YEAST MODERATE GROWTH on sputum culture   S/P EGD   with reported esophagitis. GB US Findings suggesting acute cholecystitis. HIIDA showed absent gallbladder filling. She was extubated on   Status post cholecystectomy tube  by interventional radiology. Interval history :  She denied abdominal pain, no fever,no new complaints   off Levophed  .   She is tolerating clear liquid diet  On TPN   WBC 13.9    Past Medical History:   Diagnosis Date    Abnormal computed tomography of cervical spine     sclerotic bone appearance     CVA (cerebral vascular accident) [facility])     left  side weakness    GERD (gastroesophageal reflux disease)     Hypertension     Paraproteinemia     Weight loss       Past Surgical History:   Procedure Laterality Date    INTUBATION  4/14/2019         PACEMAKER PLACEMENT  07/2011    Pacemaker is Medtronic Revo (compatible). Leads placed in 1995 are NOT MRI compatible. Placed at [facility]. V's per Dr. Orlin Hall can not have an MRI.  UPPER GASTROINTESTINAL ENDOSCOPY N/A 4/16/2019    EGD ESOPHAGOGASTRODUODENOSCOPY @ BEDSIDE  ICU 2002 performed by Gianna Gallagher MD at [address]          Admission Meds  No current facility-administered medications on file prior to encounter. Current Outpatient Medications on File Prior to Encounter   Medication Sig Dispense Refill    oxyCODONE-acetaminophen (PERCOCET) 5-325 MG per tablet Take 1 tablet by mouth every 6 hours as needed for Pain.  senna-docusate (PERICOLACE) 8.6-50 MG per tablet Take 1 tablet by mouth 2 times daily      budesonide-formoterol (SYMBICORT) 160-4.5 MCG/ACT AERO Inhale 2 puffs into the lungs 2 times daily      sucralfate (CARAFATE) 1 GM/10ML suspension Take 1 g by mouth 4 times daily       traZODone (DESYREL) 50 MG tablet Take 50 mg by mouth nightly      tiZANidine (ZANAFLEX) 2 MG tablet Take 2 mg by mouth every 8 hours as needed (left knee)       aspirin 81 MG tablet Take 81 mg by mouth daily      clopidogrel (PLAVIX) 75 MG tablet TAKE 1 TABLET DAILY 30 tablet 2    DOCQLACE 100 MG capsule TAKE 1 CAPSULE BY MOUTH IN THE MORNING & IN THE EVENING -DRINK PLENTYOF WATER WHILE TAKING THIS MEDICINE 30 capsule 1    amLODIPine (NORVASC) 10 MG tablet Take 10 mg by mouth daily.  LORazepam (ATIVAN) 0.5 MG tablet Take 0.5 mg by mouth every 6 hours as needed for Anxiety.  therapeutic multivitamin-minerals (THERAGRAN-M) tablet Take 1 tablet by mouth daily.  omeprazole (PRILOSEC) 20 MG capsule Take 20 mg by mouth daily.       venlafaxine (EFFEXOR) 37.5 MG tablet Take 37.5 mg by mouth 3 times daily.  Misc. Devices [facility]) [address] wheelchair with left fupper extremity support  Dx: stroke with left hemiparesis. 1 each 0           Allergies  Allergies   Allergen Reactions    Penicillins Shortness Of Breath and Rash    Amoxicillin         Social   Social History     Tobacco Use    Smoking status: Current Some Day Smoker     Packs/day: 1.00     Years: 30.00     Pack years: 30.00    Smokeless tobacco: Never Used   Substance Use Topics    Alcohol use: Not Currently     Alcohol/week: 16.8 oz     Types: 28 Glasses of wine per week                History reviewed. No pertinent family history. Review of Systems  Other than above 12 systems reviewed negative . Tolerating antibiotics. Physical Exam  /67   Pulse 107   Temp 99.5 °F (37.5 °C) (Oral)   Resp 23   Ht 5' (1.524 m)   Wt 132 lb 7.9 oz (60.1 kg)   SpO2 93%   BMI 25.88 kg/m²           General Appearance: alert ,NAD . Skin: warm and dry, no rash or erythema  Head: normocephalic and atraumatic  Eyes: pupils equal, round  Neck: neck supple and non tender   Pulmonary/Chest: coarse to auscultation bilaterally- no wheezes, rales or rhonchi  Cardiovascular: normal rate, regular rhythm, normal S1 and S2, no murmurs.   Abdomen: soft,mild upper abdomen tenderness  -distended,  no masses or organomegaly, gallbladder drain with  bile drainage   Extremities: no cyanosis, clubbing   Edema   Right IJ line    Tran catheter in place    Data Review:    Recent Labs     04/23/19 0400 04/24/19 0422 04/25/19 0427   WBC 17.9* 12.8* 13.6*   HGB 10.9* 10.5* 9.6*   HCT 33.6* 32.2* 29.1*   MCV 91.3 91.2 90.4   * 141* 117*     Recent Labs     04/23/19 0400 04/24/19 0422 04/25/19 0427    136 136   K 4.1 4.2 4.0   CL 98 100 101   CO2 27 27 27   PHOS 4.0 3.5 3.7   BUN 27* 28* 26*   CREATININE <0.40* <0.40* <0.40*     Recent Labs     04/23/19 0400 04/24/19 0422 04/25/19  0427   AST 27 22 19   ALT 24 19 15   BILITOT 0.30 0.33 0.50   ALKPHOS 87 80 74     Recent Labs     04/23/19  0400   AMYLASE 215*     Recent Labs     04/23/19  0400 04/24/19  0422 04/25/19  0427   PROTIME 16.5* 16.3* 18.0*   INR 1.3 1.3 1.5       Imaging Studies:                           All appropriate imaging studies and reports reviewed: Yes       Ct Abdomen Pelvis Wo Contrast Additional Contrast? Oral    Result Date: 4/14/2019  EXAMINATION: CT OF THE ABDOMEN AND PELVIS WITHOUT CONTRAST 4/14/2019 7:34 pm TECHNIQUE: CT of the abdomen and pelvis was performed without the administration of intravenous contrast. Multiplanar reformatted images are provided for review. Dose modulation, iterative reconstruction, and/or weight based adjustment of the mA/kV was utilized to reduce the radiation dose to as low as reasonably achievable. COMPARISON: 04/11/2019 HISTORY: ORDERING SYSTEM PROVIDED HISTORY: ABDOMINAL PAIN TECHNOLOGIST PROVIDED HISTORY: Water soluble contrast only please Ordering Physician Provided Reason for Exam: Abdominal pain - Vented patient. Contrast given via nurse through NG tube. Acuity: Unknown Type of Exam: Unknown Relevant Medical/Surgical History: Hx - Sepsis due to urinary tract infection. FINDINGS: Lower Chest: New moderate layering bilateral pleural effusions with bilateral lower lobe atelectasis. Organs: Limited evaluation due lack of intravenous contrast.  Cholelithiasis redemonstrated. No gallbladder wall thickening or biliary ductal dilatation. Scattered tiny hypodense lesions in the liver are too small to characterize but statistically represent benign cysts or hemangiomas and appear unchanged. The pancreas, spleen, adrenal glands, and kidneys are unremarkable. There is no hydronephrosis or urinary tract calculus. GI/Bowel: The stomach is distended. Enteric tube is in place. No contrast is seen distal to the pylorus and there is contrast reflux into the distal esophagus.   There is no evidence of bowel obstruction. The appendix is not definitely visualized. No focal pericecal inflammatory changes are evident. Pelvis: The urinary bladder is decompressed by Tran catheter. No pelvic mass is seen. Peritoneum/Retroperitoneum: Small amount of free fluid in the pelvic cavity. No free air or focal fluid collection. No abnormal lymph node. Normal abdominal aortic caliber. Moderate calcific atherosclerosis. Bones/Soft Tissues: No acute osseous abnormality. Diffuse anasarca. Moderate degenerative changes in the lumbar spine. 1. Distended, contrast filled stomach with reflux of contrast into the esophagus. No enteric contrast is seen distal to the pylorus suggesting delayed gastric emptying in the setting of ileus. 2. New moderate layering bilateral pleural effusions with bilateral lower lobe atelectasis. 3. Small amount of nonspecific free fluid in the pelvic cavity. 4. Anasarca. 5. Cholelithiasis. Xr Chest (single View Frontal)    Result Date: 4/13/2019  EXAMINATION: SINGLE XRAY VIEW OF THE CHEST 4/13/2019 7:18 am COMPARISON: April 12, 2019 HISTORY: ORDERING SYSTEM PROVIDED HISTORY: dyspnea TECHNOLOGIST PROVIDED HISTORY: dyspnea Ordering Physician Provided Reason for Exam: dyspnea Acuity: Acute Type of Exam: Subsequent/Follow-up Additional signs and symptoms: dyspnea FINDINGS: A right IJ catheter is seen with its tip terminating at the superior cavoatrial junction. The left chest wall pacemaker and leads are stable. The cardiomediastinal silhouette is stable. There is interval increased opacity at the right lung base, may be related to atelectasis versus pneumonia. There is small atelectasis and mild pleural effusion at the left lung base. There is no pneumothorax. There is no acute osseous abnormality. Interval increased opacity at the right lung base, may be related to atelectasis versus pneumonia. Small atelectasis and mild pleural effusion at the left lung, new since the prior study.      Ganesh Bobo Femur Left (min 2 Views)    Result Date: 3/27/2019  EXAMINATION: 4 XRAY VIEWS OF THE LEFT FEMUR; 2 XRAY VIEWS OF THE LEFT KNEE; 3 XRAY VIEWS OF THE LEFT TIBIA AND FIBULA 3/27/2019 7:00 pm COMPARISON: Left hip 11/14/2015. HISTORY: ORDERING SYSTEM PROVIDED HISTORY: pain TECHNOLOGIST PROVIDED HISTORY: pain Ordering Physician Provided Reason for Exam: pt twisted wrong, lt knee pain, unable to straighten knee. Acuity: Acute Type of Exam: Initial FINDINGS: Left femur, four views: The bones are diffusely osteopenic. No acute fracture deformity. The hip joint is maintained. The femoral head projects within the acetabulum. No focal soft tissue abnormality is seen. Left knee, two views: The knee is flexed. No acute osseous abnormality. No joint effusion. Joint spaces are not optimally profiled but no significant arthritic changes are apparent. Left tib-fib, three views: There is evidence of a remote healed distal tibia fracture. No acute fracture or dislocation is seen. No focal soft tissue abnormality. No acute osseous abnormality of the left femur. No acute osseous abnormality of the left knee. No acute osseous abnormality of the left tib-fib. Healed remote distal tibia fracture. Marked osteopenia, likely due to disuse. Xr Knee Left (1-2 Views)    Result Date: 3/27/2019  EXAMINATION: 4 XRAY VIEWS OF THE LEFT FEMUR; 2 XRAY VIEWS OF THE LEFT KNEE; 3 XRAY VIEWS OF THE LEFT TIBIA AND FIBULA 3/27/2019 7:00 pm COMPARISON: Left hip 11/14/2015. HISTORY: ORDERING SYSTEM PROVIDED HISTORY: pain TECHNOLOGIST PROVIDED HISTORY: pain Ordering Physician Provided Reason for Exam: pt twisted wrong, lt knee pain, unable to straighten knee. Acuity: Acute Type of Exam: Initial FINDINGS: Left femur, four views: The bones are diffusely osteopenic. No acute fracture deformity. The hip joint is maintained. The femoral head projects within the acetabulum. No focal soft tissue abnormality is seen. Left knee, two views:  The knee is flexed. No acute osseous abnormality. No joint effusion. Joint spaces are not optimally profiled but no significant arthritic changes are apparent. Left tib-fib, three views: There is evidence of a remote healed distal tibia fracture. No acute fracture or dislocation is seen. No focal soft tissue abnormality. No acute osseous abnormality of the left femur. No acute osseous abnormality of the left knee. No acute osseous abnormality of the left tib-fib. Healed remote distal tibia fracture. Marked osteopenia, likely due to disuse. Xr Knee Left (3 Views)    Result Date: 3/30/2019  EXAMINATION: 3 XRAY VIEWS OF THE LEFT KNEE 3/30/2019 10:58 am COMPARISON: March 27, 2018 HISTORY: ORDERING SYSTEM PROVIDED HISTORY: F/u L knee pain after cast TECHNOLOGIST PROVIDED HISTORY: AP, oblique, lateral F/u L knee pain after cast FINDINGS: Marked osteopenia. Interval casting, which degrades fine osseous detail question cortical offset in the lateral femoral metaphysis. No malalignment identified. No significant joint effusion. Interval casting. Question nondisplaced fracture in the lateral femoral metaphysis. Osteopenia. Xr Tibia Fibula Left (2 Views)    Result Date: 3/27/2019  EXAMINATION: 4 XRAY VIEWS OF THE LEFT FEMUR; 2 XRAY VIEWS OF THE LEFT KNEE; 3 XRAY VIEWS OF THE LEFT TIBIA AND FIBULA 3/27/2019 7:00 pm COMPARISON: Left hip 11/14/2015. HISTORY: ORDERING SYSTEM PROVIDED HISTORY: pain TECHNOLOGIST PROVIDED HISTORY: pain Ordering Physician Provided Reason for Exam: pt twisted wrong, lt knee pain, unable to straighten knee. Acuity: Acute Type of Exam: Initial FINDINGS: Left femur, four views: The bones are diffusely osteopenic. No acute fracture deformity. The hip joint is maintained. The femoral head projects within the acetabulum. No focal soft tissue abnormality is seen. Left knee, two views: The knee is flexed. No acute osseous abnormality. No joint effusion.   Joint spaces are not optimally profiled but no significant arthritic changes are apparent. Left tib-fib, three views: There is evidence of a remote healed distal tibia fracture. No acute fracture or dislocation is seen. No focal soft tissue abnormality. No acute osseous abnormality of the left femur. No acute osseous abnormality of the left knee. No acute osseous abnormality of the left tib-fib. Healed remote distal tibia fracture. Marked osteopenia, likely due to disuse. Xr Abdomen (kub) (single Ap View)    Result Date: 4/14/2019  EXAMINATION: SINGLE SUPINE XRAY VIEW(S) OF THE ABDOMEN 4/14/2019 7:39 am COMPARISON: CT abdomen and pelvis  film from 11 April 2019 HISTORY: [address] [facility] [address]: Abd Distention TECHNOLOGIST PROVIDED HISTORY: Abd Distention Ordering Physician Provided Reason for Exam: Abdominal distention. Pt was moving around in the bed. Best films at present time. Acuity: Acute Type of Exam: Initial Additional signs and symptoms: Abdominal distention. Pt was moving around in the bed. Best films at present time. FINDINGS: Portable view time stamped at 748 hours demonstrates an intestinal tube terminating in the midportion of a gaseous Spike dilated stomach. Densities are present over the stomach likely medication. Bipolar pacemaker is in situ with intact leads. Heart size is top-normal, stable. Gaseous distension of the stomach and loop of bowel in the upper mid abdomen is noted but there is gas and fecal material in the rectum. Gastric outlet obstruction or proximal small bowel partial obstruction is suspected. No free air is noted. Midline city is present over the pelvis likely a monitor or CT small bore catheter. Vascular calcification is present in the pelvis. Persistent preferential gaseous distension of the stomach although there is some gas in the upper abdomen and gas and fecal material present in the rectosigmoid. Findings suggest gastric outlet obstruction.      Ct Abdomen Pelvis W Iv Contrast    Result Date: 4/11/2019  EXAMINATION: CT OF THE ABDOMEN AND PELVIS WITH CONTRAST 4/11/2019 5:14 pm TECHNIQUE: CT of the abdomen and pelvis was performed with the administration of intravenous contrast. Multiplanar reformatted images are provided for review. Dose modulation, iterative reconstruction, and/or weight based adjustment of the mA/kV was utilized to reduce the radiation dose to as low as reasonably achievable. COMPARISON: None. HISTORY: ORDERING SYSTEM PROVIDED HISTORY: Abdominal pain TECHNOLOGIST PROVIDED HISTORY: IV Only Contrast Ordering Physician Provided Reason for Exam: patient c/o abd pain for an hour FINDINGS: Lower Chest: Trace pleural fluid bilaterally is noted. Indwelling cardiac pacemaker is present. Heart size is normal.  Coronary artery calcifications are evident. The esophagus is significantly dilated and fluid-filled. No paraesophageal adenopathy is evident. Organs: The spleen, pancreas, and adrenals are unremarkable. The liver contains hypodensities, likely cysts. The gallbladder is distended and contains a solitary gallstone. The kidneys excrete contrast bilaterally. Extrarenal collecting systems are noted. The ureters are mildly dilated down to the urinary bladder without evidence of intraluminal or ureteral obstructing calculi. GI/Bowel: Marked distention of the stomach is noted; this continues to the pylorus with appearance suggesting partial gastric outlet obstruction. Fluid is present in some small bowel loops and colon distal to the stomach. No small bowel obstruction. Pelvis: Marked distention of the urinary bladder is noted. 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 Distended ureters may be related to reflux or infection. No free pelvic fluid, pelvic or inguinal adenopathy is noted. Peritoneum/Retroperitoneum: No aortic aneurysm.   Mild to moderate plaque is noted in the infrarenal abdominal aorta and both proximal iliac arteries. Shotty lymph nodes are present around the aorta in the upper abdomen. No mesenteric adenopathy is noted. Bones/Soft Tissues: Degenerative changes are present in the hips and lower lumbar facets. Estimated biologic radiation dose for this procedure:258.77 mGy/cm2.     1. Dilatation of the thoracic esophagus filled with fluid. No obstructive process is noted. No adjacent enlarged lymph nodes. 2. Trace pleural fluid. 3. Marked distention of the stomach. This appears to extend to the pylorus. Partial gastric outlet obstruction is not excluded. 4. Bilateral dilated ureters without obstructing calculi. Urinary bladder is markedly distended with bladder wall air suggesting emphysematous cystitis. Dilatation of the ureters may be related to reflux or infection. 5. Atherosclerotic disease. 6. Other findings as above. Critical results were called by Dr. Toña Grover MD to [address] on 4/11/2019 at 17:37. Xr Chest Portable    Result Date: 4/16/2019  EXAMINATION: SINGLE XRAY VIEW OF THE CHEST 4/16/2019 6:54 am COMPARISON: 04/15/2019, 610 hours HISTORY: ORDERING SYSTEM PROVIDED HISTORY: ETT placement TECHNOLOGIST PROVIDED HISTORY: ETT placement Ordering Physician Provided Reason for Exam: on vent Acuity: Acute Type of Exam: Initial 70-year-old female on ventilator; check endotracheal tube placement FINDINGS: Portable AP upright view of the chest. Endotracheal tube distal tip overlying the mid trachea approximately 4.1 cm above the level of the ron. Enteric tube traverses the GE junction with distal tip excluded from the field of view. Left subclavian approach cardiac pacemaker device distal lead tips relatively stable in position. Right internal jugular approach central venous catheter distal tip overlying the high right atrium, stable. Cardiac monitor leads overlie the chest. Atherosclerotic calcification of the thoracic aorta. Slight stable volume loss of the left hemithorax. No pneumothorax.   No free air. Dense retrocardiac/left basilar airspace consolidation and small left-sided pleural effusion. Stable mild focal opacity at the right mid lung zone. Underlying COPD. Stable mild pulmonary vascular congestion and left-sided predominant parahilar opacity. Visualized osseous structures remain unchanged. 1. Stable multifocal airspace disease as detailed above with dense retrocardiac/left basilar airspace consolidation and small left-sided pleural effusion. Mild pulmonary vascular congestion. Findings may represent edema or multifocal pneumonia. 2. Underlying COPD. 3. Tubes and line as detailed above. Xr Chest Portable    Result Date: 4/15/2019  EXAMINATION: SINGLE XRAY VIEW OF THE CHEST 4/15/2019 6:47 am COMPARISON: 14 April 2019 HISTORY: ORDERING SYSTEM PROVIDED HISTORY: ETT placement TECHNOLOGIST PROVIDED HISTORY: ETT placement Ordering Physician Provided Reason for Exam: on vent Acuity: Acute Type of Exam: Initial FINDINGS: AP portable view of the chest time stamped at 612 hours demonstrates overlying cardiac monitoring electrodes. Endotracheal tube terminates 4 cm above the ron. Bipolar pacemaker enters from the left with intact leads in appropriate positions. Intestinal tube extends beyond the fundus of the stomach, tip not included. Right internal jugular catheter terminates at the cavoatrial junction. Heart size is normal.  Aortic arch is calcified. There is interval improvement in vascular congestion with resolution of perihilar opacities. Some bibasilar opacities remain. No extrapleural air is noted. Osseous structures are stable. Interval improvement in vascular congestion and bilateral opacities consistent with resolving pulmonary edema. Tubes and lines as above. Xr Chest Portable    Result Date: 4/14/2019  EXAMINATION: SINGLE XRAY VIEW OF THE CHEST 4/14/2019 7:57 am COMPARISON: Portable chest 04/13/2019.  HISTORY: ORDERING SYSTEM PROVIDED HISTORY: Intubation TECHNOLOGIST PROVIDED HISTORY: Intubation Ordering Physician Provided Reason for Exam: intubation Acuity: Acute Type of Exam: Initial Additional signs and symptoms: intubation FINDINGS: Endotracheal tube terminates over the midthoracic trachea. Dual-chamber pacemaker leads appear unchanged in position. Right IJ approach central venous catheter unchanged in position. Heart size not substantially changed. Perihilar and basilar opacities further increased. Left pleural effusion increased in size. Findings may reflect pulmonary edema, progressed from yesterday's exam.  Left pleural effusion increased in size. Xr Chest Portable    Result Date: 4/12/2019  EXAMINATION: SINGLE XRAY VIEW OF THE CHEST 4/12/2019 5:26 am COMPARISON: November 14, 2015. HISTORY: ORDERING SYSTEM PROVIDED HISTORY: line placement TECHNOLOGIST PROVIDED HISTORY: line placement Ordering Physician Provided Reason for Exam: New right side line placement. Acuity: Acute Type of Exam: Initial Additional signs and symptoms: New right side line placement. FINDINGS: Stable left pectoral trans venous cardiac pacer device. New right IJ central venous catheter with tip near the superior atrial caval junction. Normal lung volume. No new consolidation. Curvilinear radiopacity projecting over the right upper lobe likely represents artifact from a skin fold. No pleural effusion or pneumothorax. Stable cardiomediastinal silhouette and great vessels with redemonstration of atherosclerotic thoracic aorta. New right IJ central venous catheter with tip near the superior atrial caval junction. No pneumothorax. No new consolidation. Thank you for allowing me to participate in the care of your patient. Please feel free to contact me with any questions or concerns.      Stu Barton MD

## 2019-04-26 ENCOUNTER — APPOINTMENT (OUTPATIENT)
Dept: GENERAL RADIOLOGY | Age: 69
DRG: 853 | End: 2019-04-26
Payer: COMMERCIAL

## 2019-04-26 LAB
ALBUMIN SERPL-MCNC: 1.5 G/DL (ref 3.5–5.2)
ALBUMIN/GLOBULIN RATIO: ABNORMAL (ref 1–2.5)
ALP BLD-CCNC: 79 U/L (ref 35–104)
ALT SERPL-CCNC: 14 U/L (ref 5–33)
ANION GAP SERPL CALCULATED.3IONS-SCNC: 8 MMOL/L (ref 9–17)
AST SERPL-CCNC: 19 U/L
BILIRUB SERPL-MCNC: 0.52 MG/DL (ref 0.3–1.2)
BUN BLDV-MCNC: 26 MG/DL (ref 8–23)
BUN/CREAT BLD: ABNORMAL (ref 9–20)
C-REACTIVE PROTEIN: 134 MG/L (ref 0–5)
CALCIUM SERPL-MCNC: 7 MG/DL (ref 8.6–10.4)
CHLORIDE BLD-SCNC: 101 MMOL/L (ref 98–107)
CO2: 25 MMOL/L (ref 20–31)
CREAT SERPL-MCNC: <0.4 MG/DL (ref 0.5–0.9)
GFR AFRICAN AMERICAN: ABNORMAL ML/MIN
GFR NON-AFRICAN AMERICAN: ABNORMAL ML/MIN
GFR SERPL CREATININE-BSD FRML MDRD: ABNORMAL ML/MIN/{1.73_M2}
GFR SERPL CREATININE-BSD FRML MDRD: ABNORMAL ML/MIN/{1.73_M2}
GLUCOSE BLD-MCNC: 102 MG/DL (ref 65–105)
GLUCOSE BLD-MCNC: 127 MG/DL (ref 70–99)
GLUCOSE BLD-MCNC: 132 MG/DL (ref 65–105)
HCT VFR BLD CALC: 28.5 % (ref 36–46)
HEMOGLOBIN: 9.4 G/DL (ref 12–16)
HEPARIN INDUCED PLATELET ANTIBODY: 0.21 O.D. (ref 0–0.4)
INR BLD: 1.6
MAGNESIUM: 1.8 MG/DL (ref 1.6–2.6)
MCH RBC QN AUTO: 29.5 PG (ref 26–34)
MCHC RBC AUTO-ENTMCNC: 32.9 G/DL (ref 31–37)
MCV RBC AUTO: 89.7 FL (ref 80–100)
NRBC AUTOMATED: ABNORMAL PER 100 WBC
PDW BLD-RTO: 15.6 % (ref 11.5–14.9)
PHOSPHORUS: 3.1 MG/DL (ref 2.6–4.5)
PLATELET # BLD: 102 K/UL (ref 150–450)
PMV BLD AUTO: 8.9 FL (ref 6–12)
POTASSIUM SERPL-SCNC: 4.1 MMOL/L (ref 3.7–5.3)
PROCALCITONIN: 0.26 NG/ML
PROTHROMBIN TIME: 18.9 SEC (ref 11.8–14.6)
RBC # BLD: 3.17 M/UL (ref 4–5.2)
SEDIMENTATION RATE, ERYTHROCYTE: 65 MM (ref 0–20)
SODIUM BLD-SCNC: 134 MMOL/L (ref 135–144)
TOTAL PROTEIN: 4.2 G/DL (ref 6.4–8.3)
WBC # BLD: 12.7 K/UL (ref 3.5–11)

## 2019-04-26 PROCEDURE — 86140 C-REACTIVE PROTEIN: CPT

## 2019-04-26 PROCEDURE — 2500000003 HC RX 250 WO HCPCS: Performed by: INTERNAL MEDICINE

## 2019-04-26 PROCEDURE — 94640 AIRWAY INHALATION TREATMENT: CPT

## 2019-04-26 PROCEDURE — 84100 ASSAY OF PHOSPHORUS: CPT

## 2019-04-26 PROCEDURE — 6360000002 HC RX W HCPCS: Performed by: INTERNAL MEDICINE

## 2019-04-26 PROCEDURE — 85610 PROTHROMBIN TIME: CPT

## 2019-04-26 PROCEDURE — 71045 X-RAY EXAM CHEST 1 VIEW: CPT

## 2019-04-26 PROCEDURE — 36415 COLL VENOUS BLD VENIPUNCTURE: CPT

## 2019-04-26 PROCEDURE — 82947 ASSAY GLUCOSE BLOOD QUANT: CPT

## 2019-04-26 PROCEDURE — 6370000000 HC RX 637 (ALT 250 FOR IP): Performed by: INTERNAL MEDICINE

## 2019-04-26 PROCEDURE — 2580000003 HC RX 258: Performed by: INTERNAL MEDICINE

## 2019-04-26 PROCEDURE — 84145 PROCALCITONIN (PCT): CPT

## 2019-04-26 PROCEDURE — 2500000003 HC RX 250 WO HCPCS: Performed by: STUDENT IN AN ORGANIZED HEALTH CARE EDUCATION/TRAINING PROGRAM

## 2019-04-26 PROCEDURE — 99233 SBSQ HOSP IP/OBS HIGH 50: CPT | Performed by: INTERNAL MEDICINE

## 2019-04-26 PROCEDURE — 2700000000 HC OXYGEN THERAPY PER DAY

## 2019-04-26 PROCEDURE — 80053 COMPREHEN METABOLIC PANEL: CPT

## 2019-04-26 PROCEDURE — 6370000000 HC RX 637 (ALT 250 FOR IP): Performed by: STUDENT IN AN ORGANIZED HEALTH CARE EDUCATION/TRAINING PROGRAM

## 2019-04-26 PROCEDURE — C9113 INJ PANTOPRAZOLE SODIUM, VIA: HCPCS | Performed by: INTERNAL MEDICINE

## 2019-04-26 PROCEDURE — 6360000002 HC RX W HCPCS: Performed by: STUDENT IN AN ORGANIZED HEALTH CARE EDUCATION/TRAINING PROGRAM

## 2019-04-26 PROCEDURE — 83735 ASSAY OF MAGNESIUM: CPT

## 2019-04-26 PROCEDURE — 2060000000 HC ICU INTERMEDIATE R&B

## 2019-04-26 PROCEDURE — 94762 N-INVAS EAR/PLS OXIMTRY CONT: CPT

## 2019-04-26 PROCEDURE — 2500000003 HC RX 250 WO HCPCS: Performed by: SURGERY

## 2019-04-26 PROCEDURE — 85651 RBC SED RATE NONAUTOMATED: CPT

## 2019-04-26 PROCEDURE — 85027 COMPLETE CBC AUTOMATED: CPT

## 2019-04-26 PROCEDURE — 2580000003 HC RX 258: Performed by: STUDENT IN AN ORGANIZED HEALTH CARE EDUCATION/TRAINING PROGRAM

## 2019-04-26 RX ORDER — FLUCONAZOLE 100 MG/1
200 TABLET ORAL DAILY
Status: DISCONTINUED | OUTPATIENT
Start: 2019-04-26 | End: 2019-05-04

## 2019-04-26 RX ORDER — METRONIDAZOLE 500 MG/1
500 TABLET ORAL EVERY 8 HOURS SCHEDULED
Status: DISCONTINUED | OUTPATIENT
Start: 2019-04-26 | End: 2019-04-27

## 2019-04-26 RX ADMIN — LEVALBUTEROL 1.25 MG: 1.25 SOLUTION, CONCENTRATE RESPIRATORY (INHALATION) at 19:14

## 2019-04-26 RX ADMIN — IPRATROPIUM BROMIDE 0.5 MG: 0.5 SOLUTION RESPIRATORY (INHALATION) at 00:53

## 2019-04-26 RX ADMIN — LEVALBUTEROL 1.25 MG: 1.25 SOLUTION, CONCENTRATE RESPIRATORY (INHALATION) at 00:53

## 2019-04-26 RX ADMIN — PANTOPRAZOLE SODIUM 40 MG: 40 INJECTION, POWDER, FOR SOLUTION INTRAVENOUS at 08:31

## 2019-04-26 RX ADMIN — LEVALBUTEROL 1.25 MG: 1.25 SOLUTION, CONCENTRATE RESPIRATORY (INHALATION) at 11:10

## 2019-04-26 RX ADMIN — METOCLOPRAMIDE 10 MG: 5 INJECTION, SOLUTION INTRAMUSCULAR; INTRAVENOUS at 08:31

## 2019-04-26 RX ADMIN — Medication 10 ML: at 08:31

## 2019-04-26 RX ADMIN — ENOXAPARIN SODIUM 40 MG: 100 INJECTION SUBCUTANEOUS at 08:30

## 2019-04-26 RX ADMIN — IPRATROPIUM BROMIDE 0.5 MG: 0.5 SOLUTION RESPIRATORY (INHALATION) at 19:15

## 2019-04-26 RX ADMIN — LEVALBUTEROL 1.25 MG: 1.25 SOLUTION, CONCENTRATE RESPIRATORY (INHALATION) at 23:18

## 2019-04-26 RX ADMIN — VENLAFAXINE 37.5 MG: 37.5 TABLET ORAL at 08:30

## 2019-04-26 RX ADMIN — METOCLOPRAMIDE 10 MG: 5 INJECTION, SOLUTION INTRAMUSCULAR; INTRAVENOUS at 19:29

## 2019-04-26 RX ADMIN — ACETAMINOPHEN 650 MG: 325 TABLET, FILM COATED ORAL at 08:30

## 2019-04-26 RX ADMIN — HYDROXYZINE HYDROCHLORIDE 50 MG: 25 TABLET, FILM COATED ORAL at 23:07

## 2019-04-26 RX ADMIN — AZTREONAM 1 G: 1 INJECTION, POWDER, LYOPHILIZED, FOR SOLUTION INTRAMUSCULAR; INTRAVENOUS at 06:02

## 2019-04-26 RX ADMIN — LEVALBUTEROL 1.25 MG: 1.25 SOLUTION, CONCENTRATE RESPIRATORY (INHALATION) at 05:07

## 2019-04-26 RX ADMIN — HYDROXYZINE HYDROCHLORIDE 50 MG: 25 TABLET, FILM COATED ORAL at 08:30

## 2019-04-26 RX ADMIN — ASPIRIN 81 MG: 81 TABLET, COATED ORAL at 08:30

## 2019-04-26 RX ADMIN — IPRATROPIUM BROMIDE 0.5 MG: 0.5 SOLUTION RESPIRATORY (INHALATION) at 23:17

## 2019-04-26 RX ADMIN — IPRATROPIUM BROMIDE 0.5 MG: 0.5 SOLUTION RESPIRATORY (INHALATION) at 05:07

## 2019-04-26 RX ADMIN — LEVALBUTEROL 1.25 MG: 1.25 SOLUTION, CONCENTRATE RESPIRATORY (INHALATION) at 07:08

## 2019-04-26 RX ADMIN — IPRATROPIUM BROMIDE 0.5 MG: 0.5 SOLUTION RESPIRATORY (INHALATION) at 15:40

## 2019-04-26 RX ADMIN — METOCLOPRAMIDE 10 MG: 5 INJECTION, SOLUTION INTRAMUSCULAR; INTRAVENOUS at 02:15

## 2019-04-26 RX ADMIN — METOCLOPRAMIDE 10 MG: 5 INJECTION, SOLUTION INTRAMUSCULAR; INTRAVENOUS at 15:20

## 2019-04-26 RX ADMIN — METOPROLOL TARTRATE 10 MG: 5 INJECTION INTRAVENOUS at 23:07

## 2019-04-26 RX ADMIN — PREDNISONE 20 MG: 20 TABLET ORAL at 08:30

## 2019-04-26 RX ADMIN — SODIUM CHLORIDE: 9 INJECTION, SOLUTION INTRAVENOUS at 05:15

## 2019-04-26 RX ADMIN — LEVALBUTEROL 1.25 MG: 1.25 SOLUTION, CONCENTRATE RESPIRATORY (INHALATION) at 15:40

## 2019-04-26 RX ADMIN — FLUCONAZOLE 200 MG: 100 TABLET ORAL at 15:20

## 2019-04-26 RX ADMIN — CLOPIDOGREL BISULFATE 75 MG: 75 TABLET ORAL at 08:30

## 2019-04-26 RX ADMIN — METRONIDAZOLE 500 MG: 500 INJECTION, SOLUTION INTRAVENOUS at 03:46

## 2019-04-26 RX ADMIN — I.V. FAT EMULSION 100 ML: 20 EMULSION INTRAVENOUS at 18:23

## 2019-04-26 RX ADMIN — VENLAFAXINE 37.5 MG: 37.5 TABLET ORAL at 15:21

## 2019-04-26 RX ADMIN — VENLAFAXINE 37.5 MG: 37.5 TABLET ORAL at 19:28

## 2019-04-26 RX ADMIN — IPRATROPIUM BROMIDE 0.5 MG: 0.5 SOLUTION RESPIRATORY (INHALATION) at 07:07

## 2019-04-26 RX ADMIN — IPRATROPIUM BROMIDE 0.5 MG: 0.5 SOLUTION RESPIRATORY (INHALATION) at 11:10

## 2019-04-26 RX ADMIN — ACETAMINOPHEN 650 MG: 325 TABLET, FILM COATED ORAL at 23:07

## 2019-04-26 RX ADMIN — CALCIUM GLUCONATE: 94 INJECTION, SOLUTION INTRAVENOUS at 18:23

## 2019-04-26 RX ADMIN — METRONIDAZOLE 500 MG: 500 INJECTION, SOLUTION INTRAVENOUS at 11:28

## 2019-04-26 RX ADMIN — METRONIDAZOLE 500 MG: 500 TABLET ORAL at 21:45

## 2019-04-26 ASSESSMENT — PAIN SCALES - GENERAL
PAINLEVEL_OUTOF10: 1
PAINLEVEL_OUTOF10: 0
PAINLEVEL_OUTOF10: 6
PAINLEVEL_OUTOF10: 0
PAINLEVEL_OUTOF10: 3
PAINLEVEL_OUTOF10: 0

## 2019-04-26 ASSESSMENT — ENCOUNTER SYMPTOMS
SORE THROAT: 0
COUGH: 0
SHORTNESS OF BREATH: 0
EYE REDNESS: 0
CONSTIPATION: 0
ABDOMINAL PAIN: 0
VOMITING: 0
NAUSEA: 0
DIARRHEA: 0

## 2019-04-26 ASSESSMENT — PAIN DESCRIPTION - PAIN TYPE: TYPE: CHRONIC PAIN

## 2019-04-26 ASSESSMENT — PAIN DESCRIPTION - ONSET: ONSET: GRADUAL

## 2019-04-26 ASSESSMENT — PAIN DESCRIPTION - PROGRESSION: CLINICAL_PROGRESSION: NOT CHANGED

## 2019-04-26 ASSESSMENT — PAIN DESCRIPTION - FREQUENCY: FREQUENCY: INTERMITTENT

## 2019-04-26 ASSESSMENT — PAIN DESCRIPTION - DESCRIPTORS: DESCRIPTORS: ACHING

## 2019-04-26 ASSESSMENT — PULMONARY FUNCTION TESTS: PEFR_L/MIN: 18

## 2019-04-26 ASSESSMENT — PAIN DESCRIPTION - LOCATION: LOCATION: GENERALIZED

## 2019-04-26 NOTE — PLAN OF CARE
Problem: Risk for Impaired Skin Integrity  Goal: Tissue integrity - skin and mucous membranes  Description  Structural intactness and normal physiological function of skin and  mucous membranes. 4/26/2019 1638 by Paulette Stringer RN  Outcome: Ongoing  Skin integrity maintained. No new skin breakdown. Turned and repositioned every two     Problem: Falls - Risk of:  Goal: Will remain free from falls  Description  Will remain free from falls  Outcome: Ongoing  No falls this shift. Alert and oriented.       Problem: Pain:  Goal: Pain level will decrease  Description  Pain level will decrease  Outcome: Ongoing  Pain assessed and meds given PRN

## 2019-04-26 NOTE — PROGRESS NOTES
Nutrition Assessment (Parenteral Nutrition)    Type and Reason for Visit: Reassess    Nutrition Recommendations: Continue clear liquid diet and Ensure clear as ordered. Following changes made in additives: Na acetate- 0 to 50 mEq,Calcium gluconate 10-12 mEq, add MVI or trace elements. Nutrition Assessment: Patient stable from a nutritional standpoint as taking sips of clear liquid diet and drinking half of the Ensure Clear. TPN and lipids to continue. Malnutrition Assessment:  · Malnutrition Status: At risk for malnutrition  · Context: Acute illness or injury  · Findings of the 6 clinical characteristics of malnutrition (Minimum of 2 out of 6 clinical characteristics is required to make the diagnosis of moderate or severe Protein Calorie Malnutrition based on AND/ASPEN Guidelines):  1. Energy Intake-Less than or equal to 75% of estimated energy requirement(Previously; improving with parenteral nutrition), Greater than or equal to 5 days    2. Weight Loss-Unable to assess(edema present),    3. Fat Loss-Unable to assess,    4. Muscle Loss-Unable to assess,    5. Fluid Accumulation-(moderate fluid accumulation), Extremities  6.  Strength-Not measured    Nutrition Risk Level: High    Nutrient Needs:  · Estimated Daily Total Kcal: 3059-6197 based on wt of 25-27 per kg using wt of 57.2 kg  · Estimated Daily Protein (g): 63-68 based on 1.4-1.5 gm/kg IBW(revised)    Nutrition Diagnosis:   · Problem: Inadequate oral intake  · Etiology: related to Alteration in GI function, Insufficient energy/nutrient consumption     Signs and symptoms:  as evidenced by Nutrition support - PN    Objective Information:  · Nutrition-Focused Physical Findings: Edema: +2 pitting LUE, +3 pitting LLE, +1 RUE, RLE.  GI: bowel sounds hypoactive, passing flatus  · Wound Type: Stage I, Pressure Ulcer(Stage pressure ulcer on buttocks; thigh wound)  · Current Nutrition Therapies:  · Oral Diet Orders: Clear Liquid   · Oral Diet intake: 1-25%  · Oral Nutrition Supplement (ONS) Orders: Clear Liquid Oral Supplement  · ONS intake: 51-75%  · Parenteral Nutrition Orders:  · Type and Formula: Premix Central, 2-in-1 Custom   · Lipids: 100ml, Daily  · Rate/Volume: 65 ml/ 1560 ml daily  · Duration: Continuous  · Current PN Order Provides: 1573 kcals, 78 gm of protein (lipids included)  · Goal PN Orders Provides: 9982-7544 kcal; 63-68 gm pro  · Additional Calories: None  · Anthropometric Measures:  · Ht: 5' (152.4 cm)   · Current Body Wt: 132 lb 7.9 oz (60.1 kg)  · Admission Body Wt: 102 lb (46.3 kg)  · Ideal Body Wt: 100 lb (45.4 kg), % Ideal Body 102% based on admission wt  · BMI Classification: BMI 25.0 - 29.9 Overweight    Nutrition Interventions:   Continue current diet, Continue current ONS, Modify current Parenteral Nutrition  Continued Inpatient Monitoring, Education not appropriate at this time    Nutrition Evaluation:   · Evaluation: Progressing toward goals   · Goals: PN to meet greater 90% of estimated nutrition needs   · Monitoring: Nutrition Progression, Meal Intake, Supplement Intake, Diet Tolerance, PN Intake, PN Tolerance, Wound Healing, I&O, Weight, Pertinent Labs, Diarrhea, Nausea or Vomiting, Monitor Bowel Function      Srikanth Camacho RD, LD  Office phone [phone]

## 2019-04-26 NOTE — PLAN OF CARE
Problem: Risk for Impaired Skin Integrity  Goal: Tissue integrity - skin and mucous membranes  Description  Structural intactness and normal physiological function of skin and  mucous membranes. Intervention: SKIN ASSESSMENT  Note:   No new area of skin breakdown noted. Reposition patient frequently to prevent skin breakdown      Problem: Falls - Risk of: Intervention: Assess potential safety hazards  Note:   No attempt to get oob. Safe environment provided. Bed down and locked.  Uses call light appropriately

## 2019-04-26 NOTE — PROGRESS NOTES
[address] [facility].    PROGRESS NOTE             4/26/2019    7:24 AM    Name:   Rosangela Malone  MRN:     [id number]     Acct:      [de-identified]   Room:   2002/2002-01  IP Day:  13  Admit Date:  4/11/2019  2:48 PM    PCP:  Dot Fontaine DO  Code Status:  Full Code    Subjective: Interval History Status: improved. Patient seen and examined at bedside in the ICU. Patient clinically doing much better. Denies any complaints overnight. Off levophed this morning - was requiring small amounts overnight. CVP @ 12 this AM.    HR 90s-107. Will continue to monitor. No acute events overnight per nurse Sahara. Will consider transferring to intermediate ICU this afternoon if she remains off pressors. Brief History:     Per EMR:     The patient is a 77 y.o.  Female who presents withAbdominal Pain   and she is admitted to the hospital for the management of abdominal distension, nausea, vomiting, and acute cystitis.      Patient presents with chills, nausea, vomiting, abdominal pain (diffuse, but worse in the suprapubic region), abdominal distension, and dysuria of 2-3 days duration. Denies hematemesis. Acknowledges difficulty keeping food down but tolerating fluids. States abdominal pain is a 6/10 on average, and denies trying any medication to alleviate her sx.      Denies recent antibiotic use or steroid use.      ED: Tachycardic 100-114 BP, WBC 47.9 w/neutrophils 88,  UCx from 4/2 + E. Coli > 100,000 w/suceptibility to cipro. Review of Systems:     Review of Systems   Constitutional: Negative for chills and fever. HENT: Negative for sore throat. Eyes: Negative for redness. Respiratory: Negative for cough and shortness of breath. Cardiovascular: Negative for chest pain and leg swelling. Gastrointestinal: Negative for abdominal pain, constipation, diarrhea, nausea and vomiting.    Genitourinary: Negative for dysuria loss.    Social History:   reports that she has been smoking. She has a 30.00 pack-year smoking history. She has never used smokeless tobacco. She reports that she drank about 16.8 oz of alcohol per week. She reports that she does not use drugs. Family History: History reviewed. No pertinent family history. Vitals:  BP (!) 130/57   Pulse 98   Temp 98 °F (36.7 °C) (Oral)   Resp 22   Ht 5' (1.524 m)   Wt 132 lb 7.9 oz (60.1 kg)   SpO2 90%   BMI 25.88 kg/m²   Temp (24hrs), Av.2 °F (36.8 °C), Min:97.6 °F (36.4 °C), Max:99.5 °F (37.5 °C)    Recent Labs     19  0220   POCGLU 117* 125* 135* 132*       I/O(24Hr): Intake/Output Summary (Last 24 hours) at 2019 0724  Last data filed at 2019 0600  Gross per 24 hour   Intake 6205.31 ml   Output 3770 ml   Net 2435.31 ml       Labs:    [unfilled]    Lab Results   Component Value Date/Time    SPECIAL NOT REPORTED 2019 10:10 PM     Lab Results   Component Value Date/Time    CULTURE (A) 2019 04:59 PM     YEAST, NOT CANDIDA ALBICANS OR CANDIDA DUBLINIENSIS SCANT GROWTH    CULTURE NO ANAEROBIC ORGANISMS ISOLATED AT 3 DAYS (A) 2019 04:59 PM       [unfilled]    Radiology:    Sekou Jacobs Femur Left (min 2 Views)    Result Date: 3/27/2019  EXAMINATION: 4 XRAY VIEWS OF THE LEFT FEMUR; 2 XRAY VIEWS OF THE LEFT KNEE; 3 XRAY VIEWS OF THE LEFT TIBIA AND FIBULA 3/27/2019 7:00 pm COMPARISON: Left hip 2015. HISTORY: ORDERING SYSTEM PROVIDED HISTORY: pain TECHNOLOGIST PROVIDED HISTORY: pain Ordering Physician Provided Reason for Exam: pt twisted wrong, lt knee pain, unable to straighten knee. Acuity: Acute Type of Exam: Initial FINDINGS: Left femur, four views: The bones are diffusely osteopenic. No acute fracture deformity. The hip joint is maintained. The femoral head projects within the acetabulum. No focal soft tissue abnormality is seen. Left knee, two views: The knee is flexed.   No acute PROVIDED HISTORY: F/u L knee pain after cast TECHNOLOGIST PROVIDED HISTORY: AP, oblique, lateral F/u L knee pain after cast FINDINGS: Marked osteopenia. Interval casting, which degrades fine osseous detail question cortical offset in the lateral femoral metaphysis. No malalignment identified. No significant joint effusion. Interval casting. Question nondisplaced fracture in the lateral femoral metaphysis. Osteopenia. Xr Tibia Fibula Left (2 Views)    Result Date: 3/27/2019  EXAMINATION: 4 XRAY VIEWS OF THE LEFT FEMUR; 2 XRAY VIEWS OF THE LEFT KNEE; 3 XRAY VIEWS OF THE LEFT TIBIA AND FIBULA 3/27/2019 7:00 pm COMPARISON: Left hip 11/14/2015. HISTORY: ORDERING SYSTEM PROVIDED HISTORY: pain TECHNOLOGIST PROVIDED HISTORY: pain Ordering Physician Provided Reason for Exam: pt twisted wrong, lt knee pain, unable to straighten knee. Acuity: Acute Type of Exam: Initial FINDINGS: Left femur, four views: The bones are diffusely osteopenic. No acute fracture deformity. The hip joint is maintained. The femoral head projects within the acetabulum. No focal soft tissue abnormality is seen. Left knee, two views: The knee is flexed. No acute osseous abnormality. No joint effusion. Joint spaces are not optimally profiled but no significant arthritic changes are apparent. Left tib-fib, three views: There is evidence of a remote healed distal tibia fracture. No acute fracture or dislocation is seen. No focal soft tissue abnormality. No acute osseous abnormality of the left femur. No acute osseous abnormality of the left knee. No acute osseous abnormality of the left tib-fib. Healed remote distal tibia fracture. Marked osteopenia, likely due to disuse.      Ct Abdomen Pelvis W Iv Contrast    Result Date: 4/11/2019  EXAMINATION: CT OF THE ABDOMEN AND PELVIS WITH CONTRAST 4/11/2019 5:14 pm TECHNIQUE: CT of the abdomen and pelvis was performed with the administration of intravenous contrast. Multiplanar reformatted images are provided for review. Dose modulation, iterative reconstruction, and/or weight based adjustment of the mA/kV was utilized to reduce the radiation dose to as low as reasonably achievable. COMPARISON: None. HISTORY: ORDERING SYSTEM PROVIDED HISTORY: Abdominal pain TECHNOLOGIST PROVIDED HISTORY: IV Only Contrast Ordering Physician Provided Reason for Exam: patient c/o abd pain for an hour FINDINGS: Lower Chest: Trace pleural fluid bilaterally is noted. Indwelling cardiac pacemaker is present. Heart size is normal.  Coronary artery calcifications are evident. The esophagus is significantly dilated and fluid-filled. No paraesophageal adenopathy is evident. Organs: The spleen, pancreas, and adrenals are unremarkable. The liver contains hypodensities, likely cysts. The gallbladder is distended and contains a solitary gallstone. The kidneys excrete contrast bilaterally. Extrarenal collecting systems are noted. The ureters are mildly dilated down to the urinary bladder without evidence of intraluminal or ureteral obstructing calculi. GI/Bowel: Marked distention of the stomach is noted; this continues to the pylorus with appearance suggesting partial gastric outlet obstruction. Fluid is present in some small bowel loops and colon distal to the stomach. No small bowel obstruction. Pelvis: Marked distention of the urinary bladder is noted. 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 Distended ureters may be related to reflux or infection. No free pelvic fluid, pelvic or inguinal adenopathy is noted. Peritoneum/Retroperitoneum: No aortic aneurysm. Mild to moderate plaque is noted in the infrarenal abdominal aorta and both proximal iliac arteries. Shotty lymph nodes are present around the aorta in the upper abdomen. No mesenteric adenopathy is noted. Bones/Soft Tissues: Degenerative changes are present in the hips and lower lumbar facets.  Estimated biologic radiation dose for this procedure:258.77 mGy/cm2.     1. Dilatation of the thoracic esophagus filled with fluid. No obstructive process is noted. No adjacent enlarged lymph nodes. 2. Trace pleural fluid. 3. Marked distention of the stomach. This appears to extend to the pylorus. Partial gastric outlet obstruction is not excluded. 4. Bilateral dilated ureters without obstructing calculi. Urinary bladder is markedly distended with bladder wall air suggesting emphysematous cystitis. Dilatation of the ureters may be related to reflux or infection. 5. Atherosclerotic disease. 6. Other findings as above. Critical results were called by Dr. Angelique Singh MD to [address] on 4/11/2019 at 17:37. Xr Chest Portable    Result Date: 4/12/2019  EXAMINATION: SINGLE XRAY VIEW OF THE CHEST 4/12/2019 5:26 am COMPARISON: November 14, 2015. HISTORY: ORDERING SYSTEM PROVIDED HISTORY: line placement TECHNOLOGIST PROVIDED HISTORY: line placement Ordering Physician Provided Reason for Exam: New right side line placement. Acuity: Acute Type of Exam: Initial Additional signs and symptoms: New right side line placement. FINDINGS: Stable left pectoral trans venous cardiac pacer device. New right IJ central venous catheter with tip near the superior atrial caval junction. Normal lung volume. No new consolidation. Curvilinear radiopacity projecting over the right upper lobe likely represents artifact from a skin fold. No pleural effusion or pneumothorax. Stable cardiomediastinal silhouette and great vessels with redemonstration of atherosclerotic thoracic aorta. New right IJ central venous catheter with tip near the superior atrial caval junction. No pneumothorax. No new consolidation. Physical Examination:        Physical Exam   Constitutional: She appears well-developed. She appears cachectic. She appears ill. No distress. Intubated   HENT:   Head: Normocephalic and atraumatic.    Eyes: Pupils are equal, round, and reactive CXR   ECHO 4/12: LVEF 45%, RVSP 36 mmHg   Urology consult, appreciate recs --> cont cath until out of ICU and  stable   Critical care consult, appreciate recs   Gen Surg, right IJ central line placed on 4/12   ID consult, appreciate recs   Cardizem DC'd   Lasix HELD   F/U Cortisol level    Acute Cholecystitis   GS consulted - appreciate recommendations   Surgical intervention - cholecystomy tube placed 4/22/19    GI Malignancy ruled out by EGD, likely presenting w/Gastroparesis   EGD: esophagitis, hiatal hernia, solid food in 75% stomach    Pepcid switched to Protonix, per GI recs   Reglan started on 4/17 --> Daily ECG   Considering systemic autonomic dysfunction as overlying diagnosis (gastroparesis, neurogenic bladder,  hypotension/fluctuating BPs)     Anemia, likely 2/2 bleeding   Transfusion 1 U RBC on 4/14   Transfusion 2 U RBC on 4/16    Restarted Lovenox, INR wNL    Hypokalemia   Potassium sliding scale    Hypophosphatemia   Sodium phosphate to correct    Left knee remote distal tibia fx w/osteopenia   Ortho replaced brace    Maintenance   Lovenox   Dulera, Xopenex   Continue home meds trazodone, ASA, Plavix, Norvasc, Effexor, Spiriva     Dispo   PT/OT Eval and treat   Social work for DC planning   If stable off of [facility], may transfer to [facility]  4/26/2019  7:24 AM         Attestation and add on       I have discussed the care of Chloe Alexander , including pertinent history and exam findings,   @tdnr with the resident. I have seen and examined the patient and the key elements of all parts of the encounter have been performed by me . I agree with the assessment, plan and orders as documented by the resident.      Principal Problem:    Sepsis due to urinary tract infection ([address]  Active Problems:    Cystitis    Emphysematous cystitis    Septic shock (HCC)    Leukemoid reaction    Aspiration pneumonia of right lower lobe due to vomit [facility])    MRSA carrier    Urinary retention Abdominal distention    Elevated CEA    Elevated CA 19-9 level    Cholecystitis  Resolved Problems:    * No resolved hospital problems. *        Overall  course ;                Symptoms or ailment  course ;                                   Still critical over time. .         Hemodynamic status  . ..... [] stable    [] borderline -improving   [x] unstable                             -----   Ventilatory  status  . Jaylon Zhao On   [x] oxygen Rx  ,    [] on bipap ,   [] on ventilator        Pain control ;and sedation     [x] satisfactory       []  Sub-optima     NUTRITION:     [] NPO [] Tube Feeding  [] TPN  [x] PO                    Diet: DIET CLEAR LIQUID;  Dietary Nutrition Supplements: Clear Liquid Oral Supplement  PN-Adult 2-in-1 Central Line (Standard)  PN-Adult 2-in-1 [facility] (Standard)    . ........... Fluid , electrolyte therapy  ;  ....... Jaylon Zhao Continuous Infusions:      PN-Adult 2-in-1 Central Line (Standard)      PN-Adult 2-in-1 Central Line (Standard) 65 mL/hr at 04/25/19 1834    sodium chloride 10 mL/hr at 04/26/19 1407    norepinephrine Stopped (04/26/19 0730)    dextrose     . Jaylon Zhao   Drug therapy ;  Scheduled Meds:      fluconazole  200 mg Oral Daily    metroNIDAZOLE  500 mg Oral 3 times per day    predniSONE  20 mg Oral Daily    metoclopramide  10 mg Intravenous Q6H    fat emulsion  100 mL Intravenous Daily    [Held by provider] furosemide  40 mg Intravenous Daily    magnesium sulfate  1 g Intravenous Once    pantoprazole  40 mg Intravenous Daily    And    sodium chloride (PF)  10 mL Intravenous Daily    sodium chloride  250 mL Intravenous Once    ipratropium  0.5 mg Nebulization Q4H    insulin lispro  0-12 Units Subcutaneous Q6H    levalbuterol  1.25 mg Nebulization Q4H    venlafaxine  37.5 mg Oral TID    clopidogrel  75 mg Oral Daily    aspirin  81 mg Oral Daily    sodium chloride flush  10 mL Intravenous 2 times per day    enoxaparin  40 mg Subcutaneous Daily         _____WILL CONTINUE PRESENT THERAPY   _____    Discussed care plan with nurse after getting her input. Condition    [] ill ,  [x] high risk  [x] critical  []comatose         Prognosis  [] ok        [x] Guarded               []  Poor                 Nathaniel Torres    4/26/19    YEE STRONG [address], [address] [place] [address]. Phone [phone]   Fax: [phone]  Answering Service: [phone]                     .

## 2019-04-26 NOTE — PROGRESS NOTES
mouth 3 times daily.  Misc. Devices [facility]) [address] [employer] wheelchair with left fupper extremity support  Dx: stroke with left hemiparesis. 1 each 0           Allergies  Allergies   Allergen Reactions    Penicillins Shortness Of Breath and Rash    Amoxicillin         Social   Social History     Tobacco Use    Smoking status: Current Some Day Smoker     Packs/day: 1.00     Years: 30.00     Pack years: 30.00    Smokeless tobacco: Never Used   Substance Use Topics    Alcohol use: Not Currently     Alcohol/week: 16.8 oz     Types: 28 Glasses of wine per week                History reviewed. No pertinent family history. Review of Systems  Other than above 12 systems reviewed negative . Tolerating antibiotics. Physical Exam  BP (!) 123/51   Pulse 98   Temp 98.3 °F (36.8 °C) (Axillary)   Resp 23   Ht 5' (1.524 m)   Wt 132 lb 7.9 oz (60.1 kg)   SpO2 96%   BMI 25.88 kg/m²           General Appearance: alert ,NAD . Skin: warm and dry, no rash or erythema  Head: normocephalic and atraumatic  Eyes: pupils equal, round  Neck: neck supple and non tender   Pulmonary/Chest: coarse to auscultation bilaterally- no wheezes, rales or rhonchi  Cardiovascular: normal rate, regular rhythm, normal S1 and S2, no murmurs.   Abdomen: soft,mild upper abdomen tenderness  -distended,  no masses or organomegaly, gallbladder drain with  bile drainage   Extremities: no cyanosis, clubbing   Edema   Right IJ line       Data Review:    Recent Labs     04/24/19 0422 04/25/19 0427 04/26/19 0412   WBC 12.8* 13.6* 12.7*   HGB 10.5* 9.6* 9.4*   HCT 32.2* 29.1* 28.5*   MCV 91.2 90.4 89.7   * 117* 102*     Recent Labs     04/24/19 0422 04/25/19 0427 04/26/19 0412    136 134*   K 4.2 4.0 4.1    101 101   CO2 27 27 25   PHOS 3.5 3.7 3.1   BUN 28* 26* 26*   CREATININE <0.40* <0.40* <0.40*     Recent Labs     04/24/19 0422 04/25/19 0427 04/26/19 0412   AST 22 19 19   ALT 19 15 14   BILITOT 0.33 0.50 not definitely visualized. No focal pericecal inflammatory changes are evident. Pelvis: The urinary bladder is decompressed by Tran catheter. No pelvic mass is seen. Peritoneum/Retroperitoneum: Small amount of free fluid in the pelvic cavity. No free air or focal fluid collection. No abnormal lymph node. Normal abdominal aortic caliber. Moderate calcific atherosclerosis. Bones/Soft Tissues: No acute osseous abnormality. Diffuse anasarca. Moderate degenerative changes in the lumbar spine. 1. Distended, contrast filled stomach with reflux of contrast into the esophagus. No enteric contrast is seen distal to the pylorus suggesting delayed gastric emptying in the setting of ileus. 2. New moderate layering bilateral pleural effusions with bilateral lower lobe atelectasis. 3. Small amount of nonspecific free fluid in the pelvic cavity. 4. Anasarca. 5. Cholelithiasis. Xr Chest (single View Frontal)    Result Date: 4/13/2019  EXAMINATION: SINGLE XRAY VIEW OF THE CHEST 4/13/2019 7:18 am COMPARISON: April 12, 2019 HISTORY: ORDERING SYSTEM PROVIDED HISTORY: dyspnea TECHNOLOGIST PROVIDED HISTORY: dyspnea Ordering Physician Provided Reason for Exam: dyspnea Acuity: Acute Type of Exam: Subsequent/Follow-up Additional signs and symptoms: dyspnea FINDINGS: A right IJ catheter is seen with its tip terminating at the superior cavoatrial junction. The left chest wall pacemaker and leads are stable. The cardiomediastinal silhouette is stable. There is interval increased opacity at the right lung base, may be related to atelectasis versus pneumonia. There is small atelectasis and mild pleural effusion at the left lung base. There is no pneumothorax. There is no acute osseous abnormality. Interval increased opacity at the right lung base, may be related to atelectasis versus pneumonia. Small atelectasis and mild pleural effusion at the left lung, new since the prior study.      Xr Femur Left (min 2 Views)    Result Date: 3/27/2019  EXAMINATION: 4 XRAY VIEWS OF THE LEFT FEMUR; 2 XRAY VIEWS OF THE LEFT KNEE; 3 XRAY VIEWS OF THE LEFT TIBIA AND FIBULA 3/27/2019 7:00 pm COMPARISON: Left hip 11/14/2015. HISTORY: ORDERING SYSTEM PROVIDED HISTORY: pain TECHNOLOGIST PROVIDED HISTORY: pain Ordering Physician Provided Reason for Exam: pt twisted wrong, lt knee pain, unable to straighten knee. Acuity: Acute Type of Exam: Initial FINDINGS: Left femur, four views: The bones are diffusely osteopenic. No acute fracture deformity. The hip joint is maintained. The femoral head projects within the acetabulum. No focal soft tissue abnormality is seen. Left knee, two views: The knee is flexed. No acute osseous abnormality. No joint effusion. Joint spaces are not optimally profiled but no significant arthritic changes are apparent. Left tib-fib, three views: There is evidence of a remote healed distal tibia fracture. No acute fracture or dislocation is seen. No focal soft tissue abnormality. No acute osseous abnormality of the left femur. No acute osseous abnormality of the left knee. No acute osseous abnormality of the left tib-fib. Healed remote distal tibia fracture. Marked osteopenia, likely due to disuse. Xr Knee Left (1-2 Views)    Result Date: 3/27/2019  EXAMINATION: 4 XRAY VIEWS OF THE LEFT FEMUR; 2 XRAY VIEWS OF THE LEFT KNEE; 3 XRAY VIEWS OF THE LEFT TIBIA AND FIBULA 3/27/2019 7:00 pm COMPARISON: Left hip 11/14/2015. HISTORY: ORDERING SYSTEM PROVIDED HISTORY: pain TECHNOLOGIST PROVIDED HISTORY: pain Ordering Physician Provided Reason for Exam: pt twisted wrong, lt knee pain, unable to straighten knee. Acuity: Acute Type of Exam: Initial FINDINGS: Left femur, four views: The bones are diffusely osteopenic. No acute fracture deformity. The hip joint is maintained. The femoral head projects within the acetabulum. No focal soft tissue abnormality is seen. Left knee, two views: The knee is flexed.   No acute osseous abnormality. No joint effusion. Joint spaces are not optimally profiled but no significant arthritic changes are apparent. Left tib-fib, three views: There is evidence of a remote healed distal tibia fracture. No acute fracture or dislocation is seen. No focal soft tissue abnormality. No acute osseous abnormality of the left femur. No acute osseous abnormality of the left knee. No acute osseous abnormality of the left tib-fib. Healed remote distal tibia fracture. Marked osteopenia, likely due to disuse. Xr Knee Left (3 Views)    Result Date: 3/30/2019  EXAMINATION: 3 XRAY VIEWS OF THE LEFT KNEE 3/30/2019 10:58 am COMPARISON: March 27, 2018 HISTORY: ORDERING SYSTEM PROVIDED HISTORY: F/u L knee pain after cast TECHNOLOGIST PROVIDED HISTORY: AP, oblique, lateral F/u L knee pain after cast FINDINGS: Marked osteopenia. Interval casting, which degrades fine osseous detail question cortical offset in the lateral femoral metaphysis. No malalignment identified. No significant joint effusion. Interval casting. Question nondisplaced fracture in the lateral femoral metaphysis. Osteopenia. Xr Tibia Fibula Left (2 Views)    Result Date: 3/27/2019  EXAMINATION: 4 XRAY VIEWS OF THE LEFT FEMUR; 2 XRAY VIEWS OF THE LEFT KNEE; 3 XRAY VIEWS OF THE LEFT TIBIA AND FIBULA 3/27/2019 7:00 pm COMPARISON: Left hip 11/14/2015. HISTORY: ORDERING SYSTEM PROVIDED HISTORY: pain TECHNOLOGIST PROVIDED HISTORY: pain Ordering Physician Provided Reason for Exam: pt twisted wrong, lt knee pain, unable to straighten knee. Acuity: Acute Type of Exam: Initial FINDINGS: Left femur, four views: The bones are diffusely osteopenic. No acute fracture deformity. The hip joint is maintained. The femoral head projects within the acetabulum. No focal soft tissue abnormality is seen. Left knee, two views: The knee is flexed. No acute osseous abnormality. No joint effusion.   Joint spaces are not optimally profiled but no significant arthritic changes are apparent. Left tib-fib, three views: There is evidence of a remote healed distal tibia fracture. No acute fracture or dislocation is seen. No focal soft tissue abnormality. No acute osseous abnormality of the left femur. No acute osseous abnormality of the left knee. No acute osseous abnormality of the left tib-fib. Healed remote distal tibia fracture. Marked osteopenia, likely due to disuse. Xr Abdomen (kub) (single Ap View)    Result Date: 4/14/2019  EXAMINATION: SINGLE SUPINE XRAY VIEW(S) OF THE ABDOMEN 4/14/2019 7:39 am COMPARISON: CT abdomen and pelvis  film from 11 April 2019 HISTORY: [address] [facility] [place], [address]: Abd Distention TECHNOLOGIST PROVIDED HISTORY: Abd Distention Ordering Physician Provided Reason for Exam: Abdominal distention. Pt was moving around in the bed. Best films at present time. Acuity: Acute Type of Exam: Initial Additional signs and symptoms: Abdominal distention. Pt was moving around in the bed. Best films at present time. FINDINGS: Portable view time stamped at 748 hours demonstrates an intestinal tube terminating in the midportion of a gaseous Spike dilated stomach. Densities are present over the stomach likely medication. Bipolar pacemaker is in situ with intact leads. Heart size is top-normal, stable. Gaseous distension of the stomach and loop of bowel in the upper mid abdomen is noted but there is gas and fecal material in the rectum. Gastric outlet obstruction or proximal small bowel partial obstruction is suspected. No free air is noted. Midline city is present over the pelvis likely a monitor or CT small bore catheter. Vascular calcification is present in the pelvis. Persistent preferential gaseous distension of the stomach although there is some gas in the upper abdomen and gas and fecal material present in the rectosigmoid. Findings suggest gastric outlet obstruction.      Ct Abdomen Pelvis W Iv Contrast    Result Date: 4/11/2019  EXAMINATION: CT OF THE ABDOMEN AND PELVIS WITH CONTRAST 4/11/2019 5:14 pm TECHNIQUE: CT of the abdomen and pelvis was performed with the administration of intravenous contrast. Multiplanar reformatted images are provided for review. Dose modulation, iterative reconstruction, and/or weight based adjustment of the mA/kV was utilized to reduce the radiation dose to as low as reasonably achievable. COMPARISON: None. HISTORY: ORDERING SYSTEM PROVIDED HISTORY: Abdominal pain TECHNOLOGIST PROVIDED HISTORY: IV Only Contrast Ordering Physician Provided Reason for Exam: patient c/o abd pain for an hour FINDINGS: Lower Chest: Trace pleural fluid bilaterally is noted. Indwelling cardiac pacemaker is present. Heart size is normal.  Coronary artery calcifications are evident. The esophagus is significantly dilated and fluid-filled. No paraesophageal adenopathy is evident. Organs: The spleen, pancreas, and adrenals are unremarkable. The liver contains hypodensities, likely cysts. The gallbladder is distended and contains a solitary gallstone. The kidneys excrete contrast bilaterally. Extrarenal collecting systems are noted. The ureters are mildly dilated down to the urinary bladder without evidence of intraluminal or ureteral obstructing calculi. GI/Bowel: Marked distention of the stomach is noted; this continues to the pylorus with appearance suggesting partial gastric outlet obstruction. Fluid is present in some small bowel loops and colon distal to the stomach. No small bowel obstruction. Pelvis: Marked distention of the urinary bladder is noted. 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 Distended ureters may be related to reflux or infection. No free pelvic fluid, pelvic or inguinal adenopathy is noted. Peritoneum/Retroperitoneum: No aortic aneurysm. Mild to moderate plaque is noted in the infrarenal abdominal aorta and both proximal iliac arteries.  Shotty lymph nodes are present around the aorta in the upper abdomen. No mesenteric adenopathy is noted. Bones/Soft Tissues: Degenerative changes are present in the hips and lower lumbar facets. Estimated biologic radiation dose for this procedure:258.77 mGy/cm2.     1. Dilatation of the thoracic esophagus filled with fluid. No obstructive process is noted. No adjacent enlarged lymph nodes. 2. Trace pleural fluid. 3. Marked distention of the stomach. This appears to extend to the pylorus. Partial gastric outlet obstruction is not excluded. 4. Bilateral dilated ureters without obstructing calculi. Urinary bladder is markedly distended with bladder wall air suggesting emphysematous cystitis. Dilatation of the ureters may be related to reflux or infection. 5. Atherosclerotic disease. 6. Other findings as above. Critical results were called by Dr. Akua Trujillo MD to [address] on 4/11/2019 at 17:37. Xr Chest Portable    Result Date: 4/16/2019  EXAMINATION: SINGLE XRAY VIEW OF THE CHEST 4/16/2019 6:54 am COMPARISON: 04/15/2019, 610 hours HISTORY: ORDERING SYSTEM PROVIDED HISTORY: ETT placement TECHNOLOGIST PROVIDED HISTORY: ETT placement Ordering Physician Provided Reason for Exam: on vent Acuity: Acute Type of Exam: Initial 75-year-old female on ventilator; check endotracheal tube placement FINDINGS: Portable AP upright view of the chest. Endotracheal tube distal tip overlying the mid trachea approximately 4.1 cm above the level of the ron. Enteric tube traverses the GE junction with distal tip excluded from the field of view. Left subclavian approach cardiac pacemaker device distal lead tips relatively stable in position. Right internal jugular approach central venous catheter distal tip overlying the high right atrium, stable. Cardiac monitor leads overlie the chest. Atherosclerotic calcification of the thoracic aorta. Slight stable volume loss of the left hemithorax. No pneumothorax. No free air.   Dense retrocardiac/left basilar airspace consolidation and small left-sided pleural effusion. Stable mild focal opacity at the right mid lung zone. Underlying COPD. Stable mild pulmonary vascular congestion and left-sided predominant parahilar opacity. Visualized osseous structures remain unchanged. 1. Stable multifocal airspace disease as detailed above with dense retrocardiac/left basilar airspace consolidation and small left-sided pleural effusion. Mild pulmonary vascular congestion. Findings may represent edema or multifocal pneumonia. 2. Underlying COPD. 3. Tubes and line as detailed above. Xr Chest Portable    Result Date: 4/15/2019  EXAMINATION: SINGLE XRAY VIEW OF THE CHEST 4/15/2019 6:47 am COMPARISON: 14 April 2019 HISTORY: ORDERING SYSTEM PROVIDED HISTORY: ETT placement TECHNOLOGIST PROVIDED HISTORY: ETT placement Ordering Physician Provided Reason for Exam: on vent Acuity: Acute Type of Exam: Initial FINDINGS: AP portable view of the chest time stamped at 612 hours demonstrates overlying cardiac monitoring electrodes. Endotracheal tube terminates 4 cm above the ron. Bipolar pacemaker enters from the left with intact leads in appropriate positions. Intestinal tube extends beyond the fundus of the stomach, tip not included. Right internal jugular catheter terminates at the cavoatrial junction. Heart size is normal.  Aortic arch is calcified. There is interval improvement in vascular congestion with resolution of perihilar opacities. Some bibasilar opacities remain. No extrapleural air is noted. Osseous structures are stable. Interval improvement in vascular congestion and bilateral opacities consistent with resolving pulmonary edema. Tubes and lines as above. Xr Chest Portable    Result Date: 4/14/2019  EXAMINATION: SINGLE XRAY VIEW OF THE CHEST 4/14/2019 7:57 am COMPARISON: Portable chest 04/13/2019.  HISTORY: ORDERING SYSTEM PROVIDED HISTORY: Intubation TECHNOLOGIST PROVIDED

## 2019-04-26 NOTE — FLOWSHEET NOTE
04/26/19 1531   Encounter Summary   Services provided to: Patient   Referral/Consult From: Ольга   Continue Visiting   (4/26/19)   Complexity of Encounter Low   Length of Encounter 15 minutes   Spiritual Assessment Completed Yes   Routine   Type Follow up   Spiritual/Catholic   Type Spiritual support   Assessment Approachable; Hopeful;Coping   Intervention Prayer;Sustaining presence/ Ministry of presence; Discussed illness/injury and it's impact   Outcome Expressed gratitude;Engaged in conversation;Expressed feelings/needs/concerns;Coping; Hopeful;Receptive

## 2019-04-26 NOTE — PROGRESS NOTES
ICU Progress Note (Non-Vent)  [facility]    Patient - Suad Valentino,  Age - 77 y.o.    - 1950      Room Number -    N -  [id number]   [facility] # - [de-identified]  Date of Admission -  2019  2:48 PM    Follow-up: urosepsis, septic shock/hypotension, acute respiratory failure       Events of Past 24 Hours   Extubated . On 2 L via NC with saturations 88-90 this morning. Off levophed for most of night. Currently off with . On TPN and tolerating clear liquid diet. Feels more short of breath today. No wheezing, cough, chest pain, fever, or chills. Positive fluid balance of 2435 in past 24hrs. Mild abdominal pain and nausea. Cholecystostomy tube in place with additional 150ml bilious output overnight. Vitals    height is 5' (1.524 m) and weight is 132 lb 7.9 oz (60.1 kg). Her oral temperature is 98 °F (36.7 °C). Her blood pressure is 130/57 (abnormal) and her pulse is 98. Her respiration is 22 and oxygen saturation is 90%.        Temperature Range: Temp: 98 °F (36.7 °C) Temp  Av.9 °F (36.6 °C)  Min: 97.6 °F (36.4 °C)  Max: 98.3 °F (36.8 °C)  BP Range:  Systolic (73PQX), VKP:20 , Min:71 , GJA:453     Diastolic (27RHD), PQT:00, Min:33, Max:91    Pulse Range: Pulse  Av.9  Min: 84  Max: 120  Respiration Range: Resp  Av.3  Min: 16  Max: 30  Current Pulse Ox[de-identified]  SpO2: 90 %  24HR Pulse Ox Range:  SpO2  Av.9 %  Min: 86 %  Max: 100 %  Oxygen Amount and Delivery: O2 Flow Rate (L/min): 2 L/min    Wt Readings from Last 3 Encounters:   19 132 lb 7.9 oz (60.1 kg)   19 89 lb (40.4 kg)   19 94 lb 1.6 oz (42.7 kg)     I/O       Intake/Output Summary (Last 24 hours) at 2019 0849  Last data filed at 2019 0600  Gross per 24 hour   Intake 6205.31 ml   Output 3770 ml   Net 2435.31 ml     DRAIN/TUBE OUTPUT       Invasive Lines   Right IJ day 13    Medications      predniSONE  20 mg Oral Daily    aztreonam  1 g Intravenous Q8H    metoclopramide  10 mg Intravenous Q6H    fat emulsion  100 mL Intravenous Daily    [Held by provider] furosemide  40 mg Intravenous Daily    metroNIDAZOLE  500 mg Intravenous Q8H    magnesium sulfate  1 g Intravenous Once    pantoprazole  40 mg Intravenous Daily    And    sodium chloride (PF)  10 mL Intravenous Daily    sodium chloride  250 mL Intravenous Once    ipratropium  0.5 mg Nebulization Q4H    insulin lispro  0-12 Units Subcutaneous Q6H    levalbuterol  1.25 mg Nebulization Q4H    venlafaxine  37.5 mg Oral TID    clopidogrel  75 mg Oral Daily    aspirin  81 mg Oral Daily    sodium chloride flush  10 mL Intravenous 2 times per day    enoxaparin  40 mg Subcutaneous Daily     hydrOXYzine, sodium chloride flush, metoprolol, sodium chloride nebulizer, fentanNYL, oxyCODONE-acetaminophen, magnesium sulfate, sodium phosphate IVPB **OR** sodium phosphate IVPB, potassium chloride **OR** potassium alternative oral replacement **OR** potassium chloride, glucose, dextrose, glucagon (rDNA), dextrose, milk and molasses, sodium chloride flush, acetaminophen  IV Drips/Infusions   PN-Adult 2-in-1 Central Line (Standard) 65 mL/hr at 04/25/19 1834    sodium chloride 100 mL/hr at 04/26/19 0515    norepinephrine Stopped (04/26/19 0730)    dextrose         Diet/Nutrition   DIET CLEAR LIQUID;  Dietary Nutrition Supplements: Clear Liquid Oral Supplement  PN-Adult 2-in-1 Central Line (Standard)    Exam      Constitutional - Alert and awake  General Appearance  well developed  HEENT -normocephalic, atraumatic. Lungs - NC in place; on 2L. Coarse, decreased breath sounds. Chest expands equally, no wheezes, rales or rhonchi. Cardiovascular - Pacemaker in place. Heart sounds are normal.  normal rate and rhythm regular, no murmur, gallop or rub.   Abdomen - cholecystectomy tube in place.  Tender at the incision and epigastric pleural effusions    4/16/19      Increasing reticular markings upper chest bilaterally right greater than left   possibly due to edema or interstitial pneumonitis.       Improving left effusion with associated retrocardiac airspace disease.       Pleural-parenchymal scarring or effusion in the right lung base, stable. SYSTEMS ASSESSMENT    Neuro     Hx of CVA  - chronic left hemiparesis  - on ASA and plavix    Respiratory   Acute hypoxic respiratory failure  - extubated 4/19  -  on 2L NC. Wean oxygen as tolerated. Keep O2 sat > 88%, no home O2     Bilateral pleural effusions   - positive fluid balance of 2435ml in past 24hr. +5298 since admission. Pulmonary edema vs pneumonia? ?   - positive fluid balance in last 24 hours  - off lasix  - procalcitonin trending upward. 0.26 today. - WBC continuing to trend downward. 12 today     Hx COPD   - continue xopenex and Atrovent   -  prednisone taper    Cardiovascular   Septic shock/hypotenision  - Intermittent use of Levophed overnight. Off pressors this morning. SBP in 120s.        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  - IR placed cholecystostomy on 4/22/19.   - fluid analysis negative at this time. Cultures positive for yeast growth. - on Azactam and Flagyl      GI bleed  - episode of coffee ground emesis (resolved). Stool hemoccult +.  - Hgb 9.4  - gen surg following    TPN and clear liquid diet. Tolerating well. Renal   Urosepsis  - + for E.  Coli  - ID following     Since admission  + MRSA nasal swab culture  + budding yeast from sputum      Infectious Disease       Hematology/Oncology     Anemia  - Hgb 9.6     Thrombocytopenia  - platelets down to 097, was as high as 273  - on Lovenox, will check HIT antibody test pending     DVT prophylaxis  - on lovenox    Endocrine       Social/Spiritual/DNR/Disposition/Other       Electronically signed by Mary Dumont on 4/26/2019 at 8:49 AM      Patient seen and examined independently by me. Above discussed and I agree with medical student note except where indicated in the EMR revision history. Also see my additional comments and changes indicated by discrete font, text color, italics, and/or initials. Labs, cultures, and radiographs where available were reviewed.      Chest x-ray was somewhat worsening bilateral pleural effusions, we'll DC IV fluids since her CVP is now at 8 and blood pressure improved off Levophed  Prednisone taper  Follow-up HIT antibody results  Nutrition to improve her Hypoalbuminemia    Electronically signed by Vanessa Andino MD on 4/26/2019 at 12:22 PM

## 2019-04-26 NOTE — PROGRESS NOTES
General Surgery Progress Note  POD# 3 covering for Dr. Vel Lees:     Patient is stable she's afebrile today denies any nausea or vomiting denies any abdominal pain    Scheduled Meds:   fluconazole  200 mg Oral Daily    metroNIDAZOLE  500 mg Oral 3 times per day    predniSONE  20 mg Oral Daily    metoclopramide  10 mg Intravenous Q6H    fat emulsion  100 mL Intravenous Daily    [Held by provider] furosemide  40 mg Intravenous Daily    magnesium sulfate  1 g Intravenous Once    pantoprazole  40 mg Intravenous Daily    And    sodium chloride (PF)  10 mL Intravenous Daily    sodium chloride  250 mL Intravenous Once    ipratropium  0.5 mg Nebulization Q4H    insulin lispro  0-12 Units Subcutaneous Q6H    levalbuterol  1.25 mg Nebulization Q4H    venlafaxine  37.5 mg Oral TID    clopidogrel  75 mg Oral Daily    aspirin  81 mg Oral Daily    sodium chloride flush  10 mL Intravenous 2 times per day    enoxaparin  40 mg Subcutaneous Daily     Continuous Infusions:   PN-Adult 2-in-1 Central Line (Standard)      [facility] (Standard) 65 mL/hr at 04/25/19 1834    sodium chloride 10 mL/hr at 04/26/19 1407    norepinephrine Stopped (04/26/19 0730)    dextrose       PRN Meds:hydrOXYzine, metoprolol, sodium chloride nebulizer, fentanNYL, oxyCODONE-acetaminophen, magnesium sulfate, sodium phosphate IVPB **OR** sodium phosphate IVPB, potassium chloride **OR** potassium alternative oral replacement **OR** potassium chloride, glucose, dextrose, glucagon (rDNA), dextrose, milk and molasses, sodium chloride flush, acetaminophen    Objective:   BP (!) 143/71   Pulse 109   Temp 98 °F (36.7 °C) (Axillary)   Resp 25   Ht 5' (1.524 m)   Wt 132 lb 7.9 oz (60.1 kg)   SpO2 91%   BMI 25.88 kg/m²     I/O last 3 completed shifts: In: 6205.3 [P.O.:240;  I.V.:2299.2; IV Piggyback:450]  Out: 8201 [Urine:3500; Emesis/NG output:270]    Recent Results (from the past 24 hour(s))   POC Glucose 0.26 (H) <0.09 ng/mL   Magnesium    Collection Time: 04/26/19  4:12 AM   Result Value Ref Range    Magnesium 1.8 1.6 - 2.6 mg/dL   Phosphorus    Collection Time: 04/26/19  4:12 AM   Result Value Ref Range    Phosphorus 3.1 2.6 - 4.5 mg/dL     Abdominal examination is negative for any tenderness rebound bowel sounds are present cholecystostomy tube is functional she is draining approximately 100 mL a shift      Assessment/Plan:   1.  diet as tolerated    Electronically signed by Cat Copeland MD  on 4/26/2019 at 4:47 PM

## 2019-04-27 ENCOUNTER — APPOINTMENT (OUTPATIENT)
Dept: GENERAL RADIOLOGY | Age: 69
DRG: 853 | End: 2019-04-27
Payer: COMMERCIAL

## 2019-04-27 LAB
ALBUMIN SERPL-MCNC: 1.6 G/DL (ref 3.5–5.2)
ALBUMIN/GLOBULIN RATIO: ABNORMAL (ref 1–2.5)
ALP BLD-CCNC: 89 U/L (ref 35–104)
ALT SERPL-CCNC: 16 U/L (ref 5–33)
ANION GAP SERPL CALCULATED.3IONS-SCNC: 10 MMOL/L (ref 9–17)
AST SERPL-CCNC: 21 U/L
BILIRUB SERPL-MCNC: 0.59 MG/DL (ref 0.3–1.2)
BUN BLDV-MCNC: 21 MG/DL (ref 8–23)
BUN/CREAT BLD: ABNORMAL (ref 9–20)
C-REACTIVE PROTEIN: 87 MG/L (ref 0–5)
CALCIUM SERPL-MCNC: 7.4 MG/DL (ref 8.6–10.4)
CHLORIDE BLD-SCNC: 102 MMOL/L (ref 98–107)
CO2: 22 MMOL/L (ref 20–31)
CREAT SERPL-MCNC: <0.4 MG/DL (ref 0.5–0.9)
CULTURE: ABNORMAL
DIRECT EXAM: ABNORMAL
DIRECT EXAM: ABNORMAL
GFR AFRICAN AMERICAN: ABNORMAL ML/MIN
GFR NON-AFRICAN AMERICAN: ABNORMAL ML/MIN
GFR SERPL CREATININE-BSD FRML MDRD: ABNORMAL ML/MIN/{1.73_M2}
GFR SERPL CREATININE-BSD FRML MDRD: ABNORMAL ML/MIN/{1.73_M2}
GLUCOSE BLD-MCNC: 103 MG/DL (ref 65–105)
GLUCOSE BLD-MCNC: 108 MG/DL (ref 65–105)
GLUCOSE BLD-MCNC: 117 MG/DL (ref 70–99)
HCT VFR BLD CALC: 30.4 % (ref 36–46)
HEMOGLOBIN: 9.9 G/DL (ref 12–16)
INR BLD: 1.4
Lab: ABNORMAL
MCH RBC QN AUTO: 29.3 PG (ref 26–34)
MCHC RBC AUTO-ENTMCNC: 32.6 G/DL (ref 31–37)
MCV RBC AUTO: 89.9 FL (ref 80–100)
NRBC AUTOMATED: ABNORMAL PER 100 WBC
PDW BLD-RTO: 15.3 % (ref 11.5–14.9)
PLATELET # BLD: 97 K/UL (ref 150–450)
PMV BLD AUTO: 8.7 FL (ref 6–12)
POTASSIUM SERPL-SCNC: 4.4 MMOL/L (ref 3.7–5.3)
PROCALCITONIN: 0.2 NG/ML
PROTHROMBIN TIME: 17 SEC (ref 11.8–14.6)
RBC # BLD: 3.39 M/UL (ref 4–5.2)
SEDIMENTATION RATE, ERYTHROCYTE: 80 MM (ref 0–20)
SODIUM BLD-SCNC: 134 MMOL/L (ref 135–144)
SPECIMEN DESCRIPTION: ABNORMAL
TOTAL PROTEIN: 4.6 G/DL (ref 6.4–8.3)
WBC # BLD: 12.1 K/UL (ref 3.5–11)

## 2019-04-27 PROCEDURE — 6370000000 HC RX 637 (ALT 250 FOR IP): Performed by: INTERNAL MEDICINE

## 2019-04-27 PROCEDURE — 6360000002 HC RX W HCPCS: Performed by: INTERNAL MEDICINE

## 2019-04-27 PROCEDURE — 6370000000 HC RX 637 (ALT 250 FOR IP): Performed by: STUDENT IN AN ORGANIZED HEALTH CARE EDUCATION/TRAINING PROGRAM

## 2019-04-27 PROCEDURE — 82947 ASSAY GLUCOSE BLOOD QUANT: CPT

## 2019-04-27 PROCEDURE — 2580000003 HC RX 258: Performed by: INTERNAL MEDICINE

## 2019-04-27 PROCEDURE — 2500000003 HC RX 250 WO HCPCS: Performed by: SURGERY

## 2019-04-27 PROCEDURE — 99233 SBSQ HOSP IP/OBS HIGH 50: CPT | Performed by: INTERNAL MEDICINE

## 2019-04-27 PROCEDURE — 85651 RBC SED RATE NONAUTOMATED: CPT

## 2019-04-27 PROCEDURE — 2060000000 HC ICU INTERMEDIATE R&B

## 2019-04-27 PROCEDURE — 2700000000 HC OXYGEN THERAPY PER DAY

## 2019-04-27 PROCEDURE — 6360000002 HC RX W HCPCS: Performed by: STUDENT IN AN ORGANIZED HEALTH CARE EDUCATION/TRAINING PROGRAM

## 2019-04-27 PROCEDURE — 2500000003 HC RX 250 WO HCPCS: Performed by: STUDENT IN AN ORGANIZED HEALTH CARE EDUCATION/TRAINING PROGRAM

## 2019-04-27 PROCEDURE — 71045 X-RAY EXAM CHEST 1 VIEW: CPT

## 2019-04-27 PROCEDURE — C9113 INJ PANTOPRAZOLE SODIUM, VIA: HCPCS | Performed by: INTERNAL MEDICINE

## 2019-04-27 PROCEDURE — 94762 N-INVAS EAR/PLS OXIMTRY CONT: CPT

## 2019-04-27 PROCEDURE — 86140 C-REACTIVE PROTEIN: CPT

## 2019-04-27 PROCEDURE — 85610 PROTHROMBIN TIME: CPT

## 2019-04-27 PROCEDURE — 2500000003 HC RX 250 WO HCPCS: Performed by: INTERNAL MEDICINE

## 2019-04-27 PROCEDURE — 85027 COMPLETE CBC AUTOMATED: CPT

## 2019-04-27 PROCEDURE — APPSS60 APP SPLIT SHARED TIME 46-60 MINUTES: Performed by: NURSE PRACTITIONER

## 2019-04-27 PROCEDURE — 94640 AIRWAY INHALATION TREATMENT: CPT

## 2019-04-27 PROCEDURE — 80053 COMPREHEN METABOLIC PANEL: CPT

## 2019-04-27 PROCEDURE — 84145 PROCALCITONIN (PCT): CPT

## 2019-04-27 RX ADMIN — HYDROXYZINE HYDROCHLORIDE 50 MG: 25 TABLET, FILM COATED ORAL at 05:38

## 2019-04-27 RX ADMIN — Medication 10 ML: at 08:25

## 2019-04-27 RX ADMIN — CLOPIDOGREL BISULFATE 75 MG: 75 TABLET ORAL at 08:23

## 2019-04-27 RX ADMIN — CALCIUM GLUCONATE: 94 INJECTION, SOLUTION INTRAVENOUS at 17:14

## 2019-04-27 RX ADMIN — LEVALBUTEROL 1.25 MG: 1.25 SOLUTION, CONCENTRATE RESPIRATORY (INHALATION) at 19:17

## 2019-04-27 RX ADMIN — FLUCONAZOLE 200 MG: 100 TABLET ORAL at 08:23

## 2019-04-27 RX ADMIN — VENLAFAXINE 37.5 MG: 37.5 TABLET ORAL at 15:19

## 2019-04-27 RX ADMIN — METOCLOPRAMIDE 10 MG: 5 INJECTION, SOLUTION INTRAMUSCULAR; INTRAVENOUS at 14:55

## 2019-04-27 RX ADMIN — PANTOPRAZOLE SODIUM 40 MG: 40 INJECTION, POWDER, FOR SOLUTION INTRAVENOUS at 08:24

## 2019-04-27 RX ADMIN — IPRATROPIUM BROMIDE 0.5 MG: 0.5 SOLUTION RESPIRATORY (INHALATION) at 19:18

## 2019-04-27 RX ADMIN — METOCLOPRAMIDE 10 MG: 5 INJECTION, SOLUTION INTRAMUSCULAR; INTRAVENOUS at 02:30

## 2019-04-27 RX ADMIN — Medication 10 ML: at 08:24

## 2019-04-27 RX ADMIN — ENOXAPARIN SODIUM 40 MG: 100 INJECTION SUBCUTANEOUS at 08:24

## 2019-04-27 RX ADMIN — IPRATROPIUM BROMIDE 0.5 MG: 0.5 SOLUTION RESPIRATORY (INHALATION) at 03:23

## 2019-04-27 RX ADMIN — VENLAFAXINE 37.5 MG: 37.5 TABLET ORAL at 08:37

## 2019-04-27 RX ADMIN — METRONIDAZOLE 500 MG: 500 TABLET ORAL at 05:23

## 2019-04-27 RX ADMIN — ACETAMINOPHEN 650 MG: 325 TABLET, FILM COATED ORAL at 05:30

## 2019-04-27 RX ADMIN — LEVALBUTEROL 1.25 MG: 1.25 SOLUTION, CONCENTRATE RESPIRATORY (INHALATION) at 15:37

## 2019-04-27 RX ADMIN — METRONIDAZOLE 500 MG: 500 TABLET ORAL at 14:55

## 2019-04-27 RX ADMIN — I.V. FAT EMULSION 100 ML: 20 EMULSION INTRAVENOUS at 17:14

## 2019-04-27 RX ADMIN — METOCLOPRAMIDE 10 MG: 5 INJECTION, SOLUTION INTRAMUSCULAR; INTRAVENOUS at 08:24

## 2019-04-27 RX ADMIN — ASPIRIN 81 MG: 81 TABLET, COATED ORAL at 08:24

## 2019-04-27 RX ADMIN — METOPROLOL TARTRATE 10 MG: 5 INJECTION INTRAVENOUS at 05:38

## 2019-04-27 RX ADMIN — VENLAFAXINE 37.5 MG: 37.5 TABLET ORAL at 23:07

## 2019-04-27 RX ADMIN — METOPROLOL TARTRATE 12.5 MG: 25 TABLET ORAL at 21:00

## 2019-04-27 RX ADMIN — PREDNISONE 20 MG: 20 TABLET ORAL at 08:23

## 2019-04-27 RX ADMIN — IPRATROPIUM BROMIDE 0.5 MG: 0.5 SOLUTION RESPIRATORY (INHALATION) at 15:37

## 2019-04-27 RX ADMIN — METOPROLOL TARTRATE 12.5 MG: 25 TABLET ORAL at 08:24

## 2019-04-27 RX ADMIN — LEVALBUTEROL 1.25 MG: 1.25 SOLUTION, CONCENTRATE RESPIRATORY (INHALATION) at 03:23

## 2019-04-27 ASSESSMENT — ENCOUNTER SYMPTOMS
ALLERGIC/IMMUNOLOGIC NEGATIVE: 1
VOMITING: 0
SORE THROAT: 0
DIARRHEA: 1
ABDOMINAL PAIN: 1
BACK PAIN: 0
NAUSEA: 0
CONSTIPATION: 0
DIARRHEA: 0
EYE DISCHARGE: 0
EYE REDNESS: 0
COUGH: 0
SHORTNESS OF BREATH: 0
EYE PAIN: 0

## 2019-04-27 ASSESSMENT — PAIN SCALES - GENERAL
PAINLEVEL_OUTOF10: 2
PAINLEVEL_OUTOF10: 3

## 2019-04-27 NOTE — FLOWSHEET NOTE
04/27/19 0935   Encounter Summary   Services provided to: Patient   Referral/Consult From: [address]  (Son Radha Steele present)   Continue Visiting   (4/27/19)   Complexity of Encounter Low   Length of Encounter 15 minutes   Spiritual Assessment Completed Yes   Routine   Type Follow up   Assessment Approachable   Intervention Active listening;Prayer;Sustaining presence/ Ministry of presence   Outcome Expressed gratitude;Receptive

## 2019-04-27 NOTE — FLOWSHEET NOTE
04/27/19 1100   Encounter Summary   Services provided to: Patient   Referral/Consult From: Ольга   Continue Visiting   (4/27/19)   Volunteer Visit Yes   Complexity of Encounter Low   Length of Encounter 15 minutes   Spiritual/Caodaism   Type Spiritual support   Intervention Prayer

## 2019-04-27 NOTE — PROGRESS NOTES
Nutrition Assessment (Parenteral Nutrition)    Type and Reason for Visit: Reassess    Nutrition Recommendations: Continue clear liquid diet as ordered. Following changes made in additives: Na acetate- 50 to 70 mEq, NaCl- 130 to 140 mEq, KCl- 40 to 30 mEq, no MVI or trace elements. Nutrition Assessment: Pt remains stable from a nutritional standpoint, tolerating clear liquid diet and trying to drink the Ensure Clear. TPN and lipids to continue. Malnutrition Assessment:  · Malnutrition Status: At risk for malnutrition  · Context: Acute illness or injury  · Findings of the 6 clinical characteristics of malnutrition (Minimum of 2 out of 6 clinical characteristics is required to make the diagnosis of moderate or severe Protein Calorie Malnutrition based on AND/ASPEN Guidelines):  1. Energy Intake-Less than or equal to 75% of estimated energy requirement(Previously; improving with parenteral nutrition), Greater than or equal to 5 days    2. Weight Loss-Unable to assess(edema present),    3. Fat Loss-Unable to assess,    4. Muscle Loss-Unable to assess,    5. Fluid Accumulation-(moderate fluid accumulation), Extremities  6.  Strength-Not measured    Nutrition Risk Level: High    Nutrient Needs:  · Estimated Daily Total Kcal: 5873-6922 based on wt of 25-27 per kg using wt of 57.2 kg  · Estimated Daily Protein (g): 63-68 based on 1.4-1.5 gm/kg IBW(revised)    Nutrition Diagnosis:   · Problem: Inadequate oral intake  · Etiology: related to Alteration in GI function, Insufficient energy/nutrient consumption     Signs and symptoms:  as evidenced by Nutrition support - PN    Objective Information:  · Nutrition-Focused Physical Findings: Edema: +2 pitting LUE, +3 pitting LLE, +1 RUE, RLE.  GI: bowel sounds hypoactive, passing flatus  · Wound Type: Stage I, Pressure Ulcer(Stage pressure ulcer on buttocks; thigh wound)  · Current Nutrition Therapies:  · Oral Diet Orders: Clear Liquid   · Oral Diet intake: 1-25%  · Oral Nutrition Supplement (ONS) Orders: Clear Liquid Oral Supplement  · ONS intake: 51-75%  · Parenteral Nutrition Orders:  · Type and Formula: Premix Central, 2-in-1 Custom   · Lipids: 100ml, Daily  · Rate/Volume: 65 ml/ 1560 ml daily  · Duration: Continuous  · Current PN Order Provides: 1573 kcals, 78 gm of protein (lipids included)  · Goal PN Orders Provides: 3076-8719 kcal; 63-68 gm pro  · Additional Calories: None  · Anthropometric Measures:  · Ht: 5' (152.4 cm)   · Current Body Wt: 132 lb 7.9 oz (60.1 kg)  · Admission Body Wt: 102 lb (46.3 kg)  · Ideal Body Wt: 100 lb (45.4 kg), % Ideal Body 102% based on admission wt  · BMI Classification: BMI 25.0 - 29.9 Overweight    Nutrition Interventions:   Continue current diet, Continue current ONS, Modify current Parenteral Nutrition  Continued Inpatient Monitoring, Education not appropriate at this time    Nutrition Evaluation:   · Evaluation: Progressing toward goals   · Goals: PN to meet greater 90% of estimated nutrition needs   · Monitoring: Nutrition Progression, Meal Intake, Supplement Intake, Diet Tolerance, PN Intake, PN Tolerance, Wound Healing, I&O, Weight, Pertinent Labs, Diarrhea, Nausea or Vomiting, Monitor Bowel Function      SHITAL Wilburn R.D..   Clinical Dietitian  Office: [phone]

## 2019-04-27 NOTE — PROGRESS NOTES
General Surgery Progress Note  POD#    Subjective:     Patient is stable she is alert and awake afebrile toda      Scheduled Meds:   metoprolol tartrate  12.5 mg Oral BID    fluconazole  200 mg Oral Daily    metroNIDAZOLE  500 mg Oral 3 times per day    predniSONE  20 mg Oral Daily    metoclopramide  10 mg Intravenous Q6H    fat emulsion  100 mL Intravenous Daily    [Held by provider] furosemide  40 mg Intravenous Daily    magnesium sulfate  1 g Intravenous Once    pantoprazole  40 mg Intravenous Daily    And    sodium chloride (PF)  10 mL Intravenous Daily    sodium chloride  250 mL Intravenous Once    ipratropium  0.5 mg Nebulization Q4H    insulin lispro  0-12 Units Subcutaneous Q6H    levalbuterol  1.25 mg Nebulization Q4H    venlafaxine  37.5 mg Oral TID    clopidogrel  75 mg Oral Daily    aspirin  81 mg Oral Daily    sodium chloride flush  10 mL Intravenous 2 times per day    enoxaparin  40 mg Subcutaneous Daily     Continuous Infusions:   PN-Adult 2-in-1 Central Line (Standard)      PN-Adult 2-in-1 [facility] [employer] (Standard) 65 mL/hr at 04/26/19 1823    sodium chloride 10 mL/hr at 04/26/19 1407    norepinephrine Stopped (04/26/19 0730)    dextrose       PRN Meds:hydrOXYzine, metoprolol, sodium chloride nebulizer, fentanNYL, oxyCODONE-acetaminophen, magnesium sulfate, sodium phosphate IVPB **OR** sodium phosphate IVPB, potassium chloride **OR** potassium alternative oral replacement **OR** potassium chloride, glucose, dextrose, glucagon (rDNA), dextrose, milk and molasses, sodium chloride flush, acetaminophen    Objective:   /63   Pulse 114   Temp 99.1 °F (37.3 °C) (Oral)   Resp 29   Ht 5' (1.524 m)   Wt 132 lb 7.9 oz (60.1 kg)   SpO2 95%   BMI 25.88 kg/m²     I/O last 3 completed shifts:   In: 1994.5 [P.O.:360; I.V.:852.7]  Out: 2780 [Urine:2500; Emesis/NG output:280]    Recent Results (from the past 24 hour(s))   POC Glucose Fingerstick    Collection Time: 04/26/19  6:00 PM

## 2019-04-27 NOTE — PROGRESS NOTES
Negative for rash. Neurological: Negative for headaches. Hematological: Negative for adenopathy. Medications: Allergies: Allergies   Allergen Reactions    Penicillins Shortness Of Breath and Rash    Amoxicillin        Current Meds:   Scheduled Meds:    fluconazole  200 mg Oral Daily    metroNIDAZOLE  500 mg Oral 3 times per day    predniSONE  20 mg Oral Daily    metoclopramide  10 mg Intravenous Q6H    fat emulsion  100 mL Intravenous Daily    [Held by provider] furosemide  40 mg Intravenous Daily    magnesium sulfate  1 g Intravenous Once    pantoprazole  40 mg Intravenous Daily    And    sodium chloride (PF)  10 mL Intravenous Daily    sodium chloride  250 mL Intravenous Once    ipratropium  0.5 mg Nebulization Q4H    insulin lispro  0-12 Units Subcutaneous Q6H    levalbuterol  1.25 mg Nebulization Q4H    venlafaxine  37.5 mg Oral TID    clopidogrel  75 mg Oral Daily    aspirin  81 mg Oral Daily    sodium chloride flush  10 mL Intravenous 2 times per day    enoxaparin  40 mg Subcutaneous Daily     Continuous Infusions:    PN-Adult 2-in-1 Central Line (Standard) 65 mL/hr at 04/26/19 1823    sodium chloride 10 mL/hr at 04/26/19 1407    norepinephrine Stopped (04/26/19 0730)    dextrose       PRN Meds: hydrOXYzine, metoprolol, sodium chloride nebulizer, fentanNYL, oxyCODONE-acetaminophen, magnesium sulfate, sodium phosphate IVPB **OR** sodium phosphate IVPB, potassium chloride **OR** potassium alternative oral replacement **OR** potassium chloride, glucose, dextrose, glucagon (rDNA), dextrose, milk and molasses, sodium chloride flush, acetaminophen    Data:     Past Medical History:   has a past medical history of Abnormal computed tomography of cervical spine, CVA (cerebral vascular accident) ([address] GERD (gastroesophageal reflux disease), Hypertension, Paraproteinemia, and Weight loss. Social History:   reports that she has been smoking.   She has a 30.00 pack-year smoking history. She has never used smokeless tobacco. She reports that she drank about 16.8 oz of alcohol per week. She reports that she does not use drugs. Family History: History reviewed. No pertinent family history. Vitals:  BP (!) 142/73   Pulse 116   Temp 98.7 °F (37.1 °C) (Axillary)   Resp 28   Ht 5' (1.524 m)   Wt 132 lb 7.9 oz (60.1 kg)   SpO2 91%   BMI 25.88 kg/m²   Temp (24hrs), Av.3 °F (36.8 °C), Min:98 °F (36.7 °C), Max:99.2 °F (37.3 °C)    Recent Labs     19  0452 192 19  0220 19  1800   POCGLU 125* 135* 132* 102       I/O(24Hr): Intake/Output Summary (Last 24 hours) at 2019 0742  Last data filed at 2019 0430  Gross per 24 hour   Intake 1994.48 ml   Output 2780 ml   Net -785.52 ml       Labs:    [unfilled]    Lab Results   Component Value Date/Time    SPECIAL NOT REPORTED 2019 10:10 PM     Lab Results   Component Value Date/Time    CULTURE (A) 2019 04:59 PM     YEAST, NOT CANDIDA ALBICANS OR CANDIDA DUBLINIENSIS SCANT GROWTH    CULTURE GRAM NEGATIVE RODS ONE COLONY FORMING UNIT 2019 04:59 PM    CULTURE NO ANAEROBIC ORGANISMS ISOLATED AT 4 DAYS (A) 2019 04:59 PM       [unfilled]    Radiology:    Alan Luz Femur Left (min 2 Views)    Result Date: 3/27/2019  EXAMINATION: 4 XRAY VIEWS OF THE LEFT FEMUR; 2 XRAY VIEWS OF THE LEFT KNEE; 3 XRAY VIEWS OF THE LEFT TIBIA AND FIBULA 3/27/2019 7:00 pm COMPARISON: Left hip 2015. HISTORY: ORDERING SYSTEM PROVIDED HISTORY: pain TECHNOLOGIST PROVIDED HISTORY: pain Ordering Physician Provided Reason for Exam: pt twisted wrong, lt knee pain, unable to straighten knee. Acuity: Acute Type of Exam: Initial FINDINGS: Left femur, four views: The bones are diffusely osteopenic. No acute fracture deformity. The hip joint is maintained. The femoral head projects within the acetabulum. No focal soft tissue abnormality is seen. Left knee, two views: The knee is flexed.   No acute osseous abnormality. No joint effusion. Joint spaces are not optimally profiled but no significant arthritic changes are apparent. Left tib-fib, three views: There is evidence of a remote healed distal tibia fracture. No acute fracture or dislocation is seen. No focal soft tissue abnormality. No acute osseous abnormality of the left femur. No acute osseous abnormality of the left knee. No acute osseous abnormality of the left tib-fib. Healed remote distal tibia fracture. Marked osteopenia, likely due to disuse. Xr Knee Left (1-2 Views)    Result Date: 3/27/2019  EXAMINATION: 4 XRAY VIEWS OF THE LEFT FEMUR; 2 XRAY VIEWS OF THE LEFT KNEE; 3 XRAY VIEWS OF THE LEFT TIBIA AND FIBULA 3/27/2019 7:00 pm COMPARISON: Left hip 11/14/2015. HISTORY: ORDERING SYSTEM PROVIDED HISTORY: pain TECHNOLOGIST PROVIDED HISTORY: pain Ordering Physician Provided Reason for Exam: pt twisted wrong, lt knee pain, unable to straighten knee. Acuity: Acute Type of Exam: Initial FINDINGS: Left femur, four views: The bones are diffusely osteopenic. No acute fracture deformity. The hip joint is maintained. The femoral head projects within the acetabulum. No focal soft tissue abnormality is seen. Left knee, two views: The knee is flexed. No acute osseous abnormality. No joint effusion. Joint spaces are not optimally profiled but no significant arthritic changes are apparent. Left tib-fib, three views: There is evidence of a remote healed distal tibia fracture. No acute fracture or dislocation is seen. No focal soft tissue abnormality. No acute osseous abnormality of the left femur. No acute osseous abnormality of the left knee. No acute osseous abnormality of the left tib-fib. Healed remote distal tibia fracture. Marked osteopenia, likely due to disuse.      Xr Knee Left (3 Views)    Result Date: 3/30/2019  EXAMINATION: 3 XRAY VIEWS OF THE LEFT KNEE 3/30/2019 10:58 am COMPARISON: March 27, 2018 HISTORY: ORDERING SYSTEM PROVIDED HISTORY: F/u L knee pain after cast TECHNOLOGIST PROVIDED HISTORY: AP, oblique, lateral F/u L knee pain after cast FINDINGS: Marked osteopenia. Interval casting, which degrades fine osseous detail question cortical offset in the lateral femoral metaphysis. No malalignment identified. No significant joint effusion. Interval casting. Question nondisplaced fracture in the lateral femoral metaphysis. Osteopenia. Xr Tibia Fibula Left (2 Views)    Result Date: 3/27/2019  EXAMINATION: 4 XRAY VIEWS OF THE LEFT FEMUR; 2 XRAY VIEWS OF THE LEFT KNEE; 3 XRAY VIEWS OF THE LEFT TIBIA AND FIBULA 3/27/2019 7:00 pm COMPARISON: Left hip 11/14/2015. HISTORY: ORDERING SYSTEM PROVIDED HISTORY: pain TECHNOLOGIST PROVIDED HISTORY: pain Ordering Physician Provided Reason for Exam: pt twisted wrong, lt knee pain, unable to straighten knee. Acuity: Acute Type of Exam: Initial FINDINGS: Left femur, four views: The bones are diffusely osteopenic. No acute fracture deformity. The hip joint is maintained. The femoral head projects within the acetabulum. No focal soft tissue abnormality is seen. Left knee, two views: The knee is flexed. No acute osseous abnormality. No joint effusion. Joint spaces are not optimally profiled but no significant arthritic changes are apparent. Left tib-fib, three views: There is evidence of a remote healed distal tibia fracture. No acute fracture or dislocation is seen. No focal soft tissue abnormality. No acute osseous abnormality of the left femur. No acute osseous abnormality of the left knee. No acute osseous abnormality of the left tib-fib. Healed remote distal tibia fracture. Marked osteopenia, likely due to disuse.      Ct Abdomen Pelvis W Iv Contrast    Result Date: 4/11/2019  EXAMINATION: CT OF THE ABDOMEN AND PELVIS WITH CONTRAST 4/11/2019 5:14 pm TECHNIQUE: CT of the abdomen and pelvis was performed with the administration of intravenous contrast. Multiplanar reformatted images for this procedure:258.77 mGy/cm2.     1. Dilatation of the thoracic esophagus filled with fluid. No obstructive process is noted. No adjacent enlarged lymph nodes. 2. Trace pleural fluid. 3. Marked distention of the stomach. This appears to extend to the pylorus. Partial gastric outlet obstruction is not excluded. 4. Bilateral dilated ureters without obstructing calculi. Urinary bladder is markedly distended with bladder wall air suggesting emphysematous cystitis. Dilatation of the ureters may be related to reflux or infection. 5. Atherosclerotic disease. 6. Other findings as above. Critical results were called by Dr. Tariq Rothman MD to [address] on 4/11/2019 at 17:37. Xr Chest Portable    Result Date: 4/12/2019  EXAMINATION: SINGLE XRAY VIEW OF THE CHEST 4/12/2019 5:26 am COMPARISON: November 14, 2015. HISTORY: ORDERING SYSTEM PROVIDED HISTORY: line placement TECHNOLOGIST PROVIDED HISTORY: line placement Ordering Physician Provided Reason for Exam: New right side line placement. Acuity: Acute Type of Exam: Initial Additional signs and symptoms: New right side line placement. FINDINGS: Stable left pectoral trans venous cardiac pacer device. New right IJ central venous catheter with tip near the superior atrial caval junction. Normal lung volume. No new consolidation. Curvilinear radiopacity projecting over the right upper lobe likely represents artifact from a skin fold. No pleural effusion or pneumothorax. Stable cardiomediastinal silhouette and great vessels with redemonstration of atherosclerotic thoracic aorta. New right IJ central venous catheter with tip near the superior atrial caval junction. No pneumothorax. No new consolidation. Physical Examination:        Physical Exam   Constitutional: She appears well-developed. She appears cachectic. No distress. HENT:   Head: Normocephalic and atraumatic. Eyes: Pupils are equal, round, and reactive to light.  Conjunctivae and EOM are normal.   Neck: Normal range of motion. Neck supple. Right IJ central line in place. Site clean and dry. Cardiovascular: Regular rhythm, normal heart sounds and intact distal pulses. Tachycardia present. Exam reveals no gallop and no friction rub. No murmur heard. Pulmonary/Chest: Effort normal and breath sounds normal. No stridor. No respiratory distress. She has no wheezes. She has no rales. Intubated and mechanically ventilated. Abdominal: Soft. Bowel sounds are normal. She exhibits no distension and no mass. There is tenderness (Decreased tenderness to light palpation. ). There is no rebound and no guarding. Cholecystostomy tube in place. Musculoskeletal: Normal range of motion. She exhibits no edema (2+ b/l pitting edema in upper and lower ext. ). Left arm gloved 2/2 edema. Left knee wrapped in dressing. Posterior bruising noted. Neurological: She is alert. Skin: Skin is warm and dry. Capillary refill takes less than 2 seconds. She is not diaphoretic. No erythema. No pallor. Nursing note and vitals reviewed. Assessment:        Primary Problem  Sepsis due to urinary tract infection [facility])    Active Hospital Problems    Diagnosis Date Noted    Cholecystitis [K81.9]     Abdominal distention [R14.0]     Elevated CEA [R97.0]     Elevated CA 19-9 level [R97.8]     Urinary retention [R33.9]     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]     Septic shock (HCC) [A41.9, R65.21]     Leukemoid reaction [D72.823]     Aspiration pneumonia of right lower lobe due to vomit ([address] [J69.0]     MRSA carrier [Z22.322]     Sepsis due to urinary tract infection ([address] [A41.9, N39.0] 04/11/2019    Cystitis [N30.90] 04/11/2019       Plan:           Septic shock 2/2 E. Coli Emphysematous Cystitis + MRSA PNA   WBC 12.7 --> 12.1   Procalcitonin 0.26 --> 0.2   Anaerobic and aerobic cx: + yeast (not candida)   Diflucan   Flagyl   Levophed @ 0 this AM   F/U CXR   ECHO 4/12: LVEF 45%, RVSP 36 mmHg   Urology consult, appreciate recs --> cont cath until out of ICU and stable   Critical care consult, appreciate recs   Gen Surg, right IJ central line placed on 4/12, cholecystotomy tube 4/22   ID consult, appreciate recs   Lasix HELD   Cortisol 16.8    Sinus Tachycardia   HR 90s - 110s   Hx of CVA, and bradycardia w/pacemaker   Denies hx of Afibb   Previously on cardizem while here   Will start Lopressor 12.5 BID and monitor    Acute Cholecystitis   GS consulted - appreciate recommendations   Surgical intervention - cholecystomy tube placed 4/22/19    Gastroparesis   EGD: esophagitis, hiatal hernia, solid food in 75% stomach    Pepcid switched to Protonix, per GI recs   Reglan started on 4/17 --> Daily ECG   Considering systemic autonomic dysfunction as overlying diagnosis (gastroparesis, neurogenic bladder,  hypotension/fluctuating BPs)    TPN   CLD --> GS to advance    Anemia, likely 2/2 bleeding   Transfusion 1 U RBC on 4/14   Transfusion 2 U RBC on 4/16    Restarted Lovenox, INR wNL    Hypokalemia   Potassium sliding scale    Hypophosphatemia   Sodium phosphate to correct    Left knee remote distal tibia fx w/osteopenia   Ortho replaced brace    Maintenance   Lovenox   Dulera, Xopenex   Continue home meds trazodone, ASA, Plavix, Norvasc, Effexor, Spiriva     Dispo   PT/OT Eval and treat   Social work for [address] to progressive    Last Martin MD  4/27/2019  7:42 AM       Attestation and add on       I have discussed the care of Jonas Grady , including pertinent history and exam findings,      4/27/19  with the resident. I have seen and examined the patient and the key elements of all parts of the encounter have been performed by me . I agree with the assessment, plan and orders as documented by the resident.      Principal Problem:    Sepsis due to urinary tract infection ([address]  Active Problems:    Cystitis    Emphysematous cystitis    Septic shock (HCC)    Leukemoid reaction    Aspiration pneumonia of right lower lobe due to vomit (HCC)    MRSA carrier    Urinary retention    Abdominal distention    Elevated CEA    Elevated CA 19-9 level    Cholecystitis    CRP elevated    Elevated procalcitonin    Bandemia  Resolved Problems:    * No resolved hospital problems. *            --  Zoila Alves    Principal Problem:    Sepsis due to urinary tract infection ([address]  Active Problems:    Cystitis    Emphysematous cystitis    Septic shock (HCC)    Leukemoid reaction    Aspiration pneumonia of right lower lobe due to vomit (HCC)    MRSA carrier    Urinary retention    Abdominal distention    Elevated CEA    Elevated CA 19-9 level    Cholecystitis    CRP elevated    Elevated procalcitonin    Bandemia  Resolved Problems:    * No resolved hospital problems. *     Vitals:    04/27/19 1500 04/27/19 1537 04/27/19 1912 04/27/19 1918   BP: 130/63  132/70    Pulse: 114  116    Resp: 24 29 18 18   Temp:   99.1 °F (37.3 °C)    TempSrc:   Axillary    SpO2: 96% 95% 96% 98%   Weight:       Height:            Lab Results   Component Value Date    HGB 9.9 04/27/2019    HGB 9.4 04/26/2019    HGB 9.6 04/25/2019     Lab Results   Component Value Date    CREATININE <0.40 04/27/2019    CREATININE <0.40 04/26/2019    CREATININE <0.40 04/25/2019     No components found for: POTASSIUM  Lab Results   Component Value Date    POCGLU 103 04/27/2019    POCGLU 108 04/27/2019    POCGLU 102 04/26/2019            Medications: Allergies:     Allergies   Allergen Reactions    Penicillins Shortness Of Breath and Rash    Amoxicillin        Current Meds:   Scheduled Meds:    metoprolol tartrate  12.5 mg Oral BID    fluconazole  200 mg Oral Daily    predniSONE  20 mg Oral Daily    metoclopramide  10 mg Intravenous Q6H    fat emulsion  100 mL Intravenous Daily    [Held by provider] furosemide  40 mg Intravenous Daily    magnesium sulfate  1 g Intravenous Once    pantoprazole  40 mg Intravenous Daily    And    sodium chloride (PF)  10 mL Septic shock (HCC)    Leukemoid reaction    Aspiration pneumonia of right lower lobe due to vomit [facility]    MRSA carrier    Urinary retention    Abdominal distention    Elevated CEA    Elevated CA 19-9 level    Cholecystitis                               Donaldbryan Foster MD               -- ;            [address] [place] [address] 165  226 [facility], [address] [place] [address].    Phone [phone]   Fax: [phone]  Answering Service: [phone]

## 2019-04-27 NOTE — PROGRESS NOTES
Pulmonary Progress Note  [facility]      Patient - Gatito Gutierrez,  Age - 77 y.o.    - 1950      Room Number -    MRN -  [id number]   [facility]  Date of Admission -  2019  2:48 PM        Consulting Neptali Carroll MD  Primary Care Physician - Soni Mcknight, DO     SUBJECTIVE   Comfortable in bed, alert, talking,   taking diet  Plan transfer to stepdown    OBJECTIVE   VITALS    height is 5' (1.524 m) and weight is 132 lb 7.9 oz (60.1 kg). Her oral temperature is 99.1 °F (37.3 °C). Her blood pressure is 131/97 (abnormal) and her pulse is 109. Her respiration is 29 and oxygen saturation is 96%. Body mass index is 25.88 kg/m². Temperature Range: Temp: 99.1 °F (37.3 °C) Temp  Av.5 °F (36.9 °C)  Min: 98 °F (36.7 °C)  Max: 99.2 °F (37.3 °C)  BP Range:  Systolic (51LQW), CZD:481 , Min:105 , AE     Diastolic (70RHG), MNI:91, Min:46, Max:104    Pulse Range: Pulse  Av.6  Min: 99  Max: 123  Respiration Range: Resp  Av.4  Min: 21  Max: 35  Current Pulse Ox[de-identified]  SpO2: 96 %  24HR Pulse Ox Range:  SpO2  Av.8 %  Min: 87 %  Max: 100 %  Oxygen Amount and Delivery: O2 Flow Rate (L/min): 2 L/min    Wt Readings from Last 3 Encounters:   19 132 lb 7.9 oz (60.1 kg)   19 89 lb (40.4 kg)   19 94 lb 1.6 oz (42.7 kg)       I/O (24 Hours)    Intake/Output Summary (Last 24 hours) at 2019 1255  Last data filed at 2019 0430  Gross per 24 hour   Intake 1994.48 ml   Output 2780 ml   Net -785.52 ml       EXAM     General Appearance  Awake, alert, oriented, in no acute distress  HEENT - normocephalic, atraumatic.  []  Mallampati  [] Crowded airway   [] Macroglossia  []  Retrognathia  [] Micrognathia  []  Normal tongue size []  Normal Bite  [] [facility] sign positive    Neck - Supple,  trachea midline   Lungs - clear bilaterally, no rales no rhonchi  Cardiovascular - Heart sounds are normal.  Regular rate and rhythm   Abdomen - Soft, nontender, nondistended, no masses or organomegaly  Neurologic - There are no focal motor or sensory deficits  Skin - No bruising or bleeding  Extremities - No clubbing, cyanosis, edema    MEDS      metoprolol tartrate  12.5 mg Oral BID    fluconazole  200 mg Oral Daily    metroNIDAZOLE  500 mg Oral 3 times per day    predniSONE  20 mg Oral Daily    metoclopramide  10 mg Intravenous Q6H    fat emulsion  100 mL Intravenous Daily    [Held by provider] furosemide  40 mg Intravenous Daily    magnesium sulfate  1 g Intravenous Once    pantoprazole  40 mg Intravenous Daily    And    sodium chloride (PF)  10 mL Intravenous Daily    sodium chloride  250 mL Intravenous Once    ipratropium  0.5 mg Nebulization Q4H    insulin lispro  0-12 Units Subcutaneous Q6H    levalbuterol  1.25 mg Nebulization Q4H    venlafaxine  37.5 mg Oral TID    clopidogrel  75 mg Oral Daily    aspirin  81 mg Oral Daily    sodium chloride flush  10 mL Intravenous 2 times per day    enoxaparin  40 mg Subcutaneous Daily      PN-Adult 2-in-1 Central Line (Standard) 65 mL/hr at 04/26/19 1823    sodium chloride 10 mL/hr at 04/26/19 1407    norepinephrine Stopped (04/26/19 0730)    dextrose       hydrOXYzine, metoprolol, sodium chloride nebulizer, fentanNYL, oxyCODONE-acetaminophen, magnesium sulfate, sodium phosphate IVPB **OR** sodium phosphate IVPB, potassium chloride **OR** potassium alternative oral replacement **OR** potassium chloride, glucose, dextrose, glucagon (rDNA), dextrose, milk and molasses, sodium chloride flush, acetaminophen    LABS   CBC   Recent Labs     04/27/19  0350   WBC 12.1*   HGB 9.9*   HCT 30.4*   MCV 89.9   PLT 97*     BMP:   Lab Results   Component Value Date     04/27/2019    K 4.4 04/27/2019     04/27/2019    CO2 22 04/27/2019    BUN 21 04/27/2019    LABALBU 1.6 04/27/2019    LABALBU 4.6 05/17/2012    CREATININE <0.40 04/27/2019    CALCIUM 7.4 04/27/2019    GFRAA CANNOT BE CALCULATED 04/27/2019    LABGLOM CANNOT BE CALCULATED 04/27/2019     ABGs:  Lab Results   Component Value Date    PHART 7.519 04/19/2019    PO2ART 73.3 04/19/2019    DPZ1BRY 42.5 04/19/2019      Lab Results   Component Value Date    MODE PRVC 04/19/2019     Ionized Calcium:  No results found for: IONCA  Magnesium:    Lab Results   Component Value Date    MG 1.8 04/26/2019     Phosphorus:    Lab Results   Component Value Date    PHOS 3.1 04/26/2019        LIVER PROFILE   Recent Labs     04/27/19  0350   AST 21   ALT 16   BILITOT 0.59   ALKPHOS 89     INR   Recent Labs     04/27/19  0350   INR 1.4     PTT   Lab Results   Component Value Date    APTT 27.7 03/28/2012         RADIOLOGY     (See actual reports for details)    ASSESSMENT/PLAN   Principal Problem:    Sepsis due to urinary tract infection ([address]  Active Problems:    Cystitis    Emphysematous cystitis    Septic shock ([address]    Leukemoid reaction    Aspiration pneumonia of right lower lobe due to vomit (HCC)    MRSA carrier    Urinary retention    Abdominal distention    Elevated CEA    Elevated CA 19-9 level    Cholecystitis  Resolved Problems:    * No resolved hospital problems.  *  Good urinary output  On oral steroids    Plan  Continue oral steroids  Increase activity  Okay transfer  Follow chest x-ray      Electronically signed by Sarah Goff MD on 4/27/2019 at 12:55 PM

## 2019-04-27 NOTE — CARE COORDINATION
ONGOING DISCHARGE PLAN:    LSW following for Placement to [facility]. Will need wound care    Patient was transferred out of ICU to PCU today. Remains on IV Lasix, IV Protonix, lipids, TPN and IV fluids. Patient has a biliary tube placed on 4/22/19    Will continue to follow for additional discharge needs.     Electronically signed by Darek Florez, RN on 4/27/2019 at 5:09 PM

## 2019-04-27 NOTE — PROGRESS NOTES
a right lower infiltrate. High CA-19-9,CEA  Allergy to [facility] w SOB   Urine culture  grew ESCHERICHIA COLI resistant to Ampicillin ,sensitive to all other tested ABXS . Treated w azactam, stopped 19  Blood cultures negative . Mycoplasma IGM 0.97   YEAST MODERATE GROWTH on sputum culture   S/P EGD   with reported esophagitis. GB US Findings suggesting acute cholecystitis. HIIDA showed absent gallbladder filling. She was extubated on   Status post cholecystectomy tube  by interventional radiology.         Interval changes  2019   No fevers. Hemodynamically stable off pressors. On TPN. Taking some clear liquids by mouth. Very tired -GB fluid green no pus  WBCs stalling. CRP remains high, trending down. LFTs wnl on Diflucan. Summary of relevant labs:  Labs:  CRP 91.7-134-87  ESR 65-80  WBC: 13.6-12.7-12.1  ALP 89  ALT 16  AST 21  Bilirubin 0.59  Micro:  19 BC x 2-Negative to date. 19 GB fluid-+ yeast.  Not candida Albicans or Leah Dubliniensis. No anaerobic organisms to date. Imagin/19/19 US Gallbladder  EXAMINATION:   RIGHT UPPER QUADRANT ULTRASOUND 2019 10:48 am       COMPARISON:   CT abdomen and pelvis 2018       HISTORY:   ORDERING SYSTEM PROVIDED HISTORY: ABDOMINAL PAIN       FINDINGS:   Pancreas is not well visualized.       No liver lesion.       Gallbladder wall thickening.  Gallbladder sludge.  Cholelithiasis. Pericholecystic fluid.       Common bile duct measures at the upper limits of normal at 7 mm.           Impression   Findings suggesting acute cholecystitis.               19 CXR  FINDINGS:   Right IJ central venous catheter is in place tip in the SVC right atrial   junction.  Pacer wires are in place.  The heart and mediastinal structures   are stable.  Pleural effusions are present with bibasilar atelectasis.    Bilateral lung infiltrates are present in the upper lung fields.           Impression   Persistent pleural History     Socioeconomic History    Marital status:      Spouse name: Not on file    Number of children: Not on file    Years of education: Not on file    Highest education level: Not on file   Occupational History    Not on file   Social Needs    Financial resource strain: Not on file    Food insecurity:     Worry: Not on file     Inability: Not on file    Transportation needs:     Medical: Not on file     Non-medical: Not on file   Tobacco Use    Smoking status: Current Some Day Smoker     Packs/day: 1.00     Years: 30.00     Pack years: 30.00    Smokeless tobacco: Never Used   Substance and Sexual Activity    Alcohol use: Not Currently     Alcohol/week: 16.8 oz     Types: 28 Glasses of wine per week    Drug use: No    Sexual activity: Not on file   Lifestyle    Physical activity:     Days per week: Not on file     Minutes per session: Not on file    Stress: Not on file   Relationships    Social connections:     Talks on phone: Not on file     Gets together: Not on file     Attends Hindu service: Not on file     Active member of club or organization: Not on file     Attends meetings of clubs or organizations: Not on file     Relationship status: Not on file    Intimate partner violence:     Fear of current or ex partner: Not on file     Emotionally abused: Not on file     Physically abused: Not on file     Forced sexual activity: Not on file   Other Topics Concern    Not on file   Social History Narrative    Not on file       Family History:   History reviewed. No pertinent family history. Allergies:   Penicillins and Amoxicillin     Review of Systems:     Review of Systems   Constitutional: Negative for chills and fever. HENT: Negative for hearing loss and sore throat. Eyes: Negative for pain and discharge. Respiratory: Negative for cough and shortness of breath. Cardiovascular: Positive for leg swelling. Negative for chest pain and palpitations.    Gastrointestinal: Positive for abdominal pain and diarrhea. Negative for nausea and vomiting. Abd tenderness   Endocrine: Negative. Genitourinary: Negative for flank pain. Tran. Musculoskeletal: Negative for arthralgias and back pain. Cast to L lower leg. Skin: Negative for rash and wound. Allergic/Immunologic: Negative. Neurological: Positive for weakness. Negative for dizziness and headaches. L-sided weakness from stroke. Hematological: Negative. Psychiatric/Behavioral: Negative for agitation and confusion. Physical Examination :     Patient Vitals for the past 8 hrs:   BP Temp Temp src Pulse Resp SpO2   04/27/19 0915 (!) 158/100 -- -- 118 29 92 %   04/27/19 0800 137/77 98.2 °F (36.8 °C) Oral 107 30 91 %   04/27/19 0700 128/61 -- -- 104 29 92 %   04/27/19 0500 (!) 142/73 -- -- 116 28 91 %   04/27/19 0430 131/70 -- -- 117 28 91 %   04/27/19 0400 128/74 98.7 °F (37.1 °C) Axillary 118 28 90 %   04/27/19 0330 (!) 147/85 -- -- 113 27 97 %   04/27/19 0326 -- -- -- -- (!) 35 96 %   04/27/19 0300 (!) 127/56 -- -- 116 25 90 %       Physical Exam   Constitutional: She is oriented to person, place, and time. She appears well-developed and well-nourished. No distress. HENT:   Head: Normocephalic and atraumatic. Mouth and tongue very dry. Eyes: Pupils are equal, round, and reactive to light. Conjunctivae and EOM are normal.   Neck: Normal range of motion. Neck supple. No JVD present. No tracheal deviation present. R IJ TLC. Site clean. Cardiovascular: Normal rate, regular rhythm, normal heart sounds and intact distal pulses. Pulmonary/Chest: No stridor. No respiratory distress. She has no wheezes. Lungs clear, diminished to RLL. Abdominal: Soft. She exhibits no distension. There is tenderness. No hernia. Diffuse abd tenderness to light palpation. RUQ ashley drain with brown bile drainage. Genitourinary:   Genitourinary Comments: Tran. Urine clear.    Musculoskeletal: She exhibits edema. She exhibits no tenderness or deformity. 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 LUE, bilat feet ankles with 3+ edema. Neurological: She is alert and oriented to person, place, and time. No sensory deficit. Skin: Skin is warm and dry. Capillary refill takes less than 2 seconds. No rash noted. Psychiatric: She has a normal mood and affect. Her behavior is normal. Judgment and thought content normal.   Nursing note and vitals reviewed. Medical Decision Making:   I have independently reviewed/ordered the following labs:    CBC with Differential:   Recent Labs     04/26/19 0412 04/27/19 0350   WBC 12.7* 12.1*   HGB 9.4* 9.9*   HCT 28.5* 30.4*   * 97*     BMP:  Recent Labs     04/25/19 0427 04/26/19 0412 04/27/19 0350    134* 134*   K 4.0 4.1 4.4    101 102   CO2 27 25 22   BUN 26* 26* 21   CREATININE <0.40* <0.40* <0.40*   MG 1.7 1.8  --      Hepatic Function Panel:   Recent Labs     04/26/19 0412 04/27/19 0350   PROT 4.2* 4.6*   LABALBU 1.5* 1.6*   BILITOT 0.52 0.59   ALKPHOS 79 89   ALT 14 16   AST 19 21     No results for input(s): RPR in the last 72 hours. No results for input(s): HIV in the last 72 hours. No results for input(s): BC in the last 72 hours. Lab Results   Component Value Date    CREATININE <0.40 04/27/2019    GLUCOSE 117 04/27/2019    GLUCOSE 87 05/21/2012       Detailed results: Thank you for allowing us to participate in the care of this patient. Please call with questions. This note is created with the assistance of a speech recognition program.  While intending to generate adocument that actually reflects the content of the visit, the document can still have some errors including those of syntax and sound a like substitutions which may escape proof reading. It such instances, actual meaningcan be extrapolated by contextual diversion.     AGAPITO Clay - [facility]  Office: [phone]      I have discussed the care of the patient, including pertinent history and exam findings,  with the CNP, I have seen and examined the patient and the key elements of all parts of the encounter have been performed by me. I agree with the assessment, plan and orders as documented by the CNP.     Christiana Asif, Infectious Diseases

## 2019-04-28 LAB
ALBUMIN SERPL-MCNC: 1.4 G/DL (ref 3.5–5.2)
ALBUMIN/GLOBULIN RATIO: ABNORMAL (ref 1–2.5)
ALP BLD-CCNC: 86 U/L (ref 35–104)
ALT SERPL-CCNC: 15 U/L (ref 5–33)
ANION GAP SERPL CALCULATED.3IONS-SCNC: 8 MMOL/L (ref 9–17)
AST SERPL-CCNC: 20 U/L
BILIRUB SERPL-MCNC: 0.59 MG/DL (ref 0.3–1.2)
BUN BLDV-MCNC: 19 MG/DL (ref 8–23)
BUN/CREAT BLD: ABNORMAL (ref 9–20)
C-REACTIVE PROTEIN: 121.1 MG/L (ref 0–5)
CALCIUM SERPL-MCNC: 7.1 MG/DL (ref 8.6–10.4)
CHLORIDE BLD-SCNC: 104 MMOL/L (ref 98–107)
CO2: 23 MMOL/L (ref 20–31)
CREAT SERPL-MCNC: <0.4 MG/DL (ref 0.5–0.9)
GFR AFRICAN AMERICAN: ABNORMAL ML/MIN
GFR NON-AFRICAN AMERICAN: ABNORMAL ML/MIN
GFR SERPL CREATININE-BSD FRML MDRD: ABNORMAL ML/MIN/{1.73_M2}
GFR SERPL CREATININE-BSD FRML MDRD: ABNORMAL ML/MIN/{1.73_M2}
GLUCOSE BLD-MCNC: 105 MG/DL (ref 65–105)
GLUCOSE BLD-MCNC: 106 MG/DL (ref 70–99)
GLUCOSE BLD-MCNC: 111 MG/DL (ref 65–105)
GLUCOSE BLD-MCNC: 112 MG/DL (ref 65–105)
GLUCOSE BLD-MCNC: 118 MG/DL (ref 65–105)
GLUCOSE BLD-MCNC: 156 MG/DL (ref 65–105)
HCT VFR BLD CALC: 27 % (ref 36–46)
HEMOGLOBIN: 9 G/DL (ref 12–16)
INR BLD: 1.4
MAGNESIUM: 1.7 MG/DL (ref 1.6–2.6)
MCH RBC QN AUTO: 29.5 PG (ref 26–34)
MCHC RBC AUTO-ENTMCNC: 33.2 G/DL (ref 31–37)
MCV RBC AUTO: 88.9 FL (ref 80–100)
NRBC AUTOMATED: ABNORMAL PER 100 WBC
PDW BLD-RTO: 15.2 % (ref 11.5–14.9)
PHOSPHORUS: 3.3 MG/DL (ref 2.6–4.5)
PLATELET # BLD: 87 K/UL (ref 150–450)
PMV BLD AUTO: 8.9 FL (ref 6–12)
POTASSIUM SERPL-SCNC: 4.5 MMOL/L (ref 3.7–5.3)
PROCALCITONIN: 0.19 NG/ML
PROTHROMBIN TIME: 17.3 SEC (ref 11.8–14.6)
RBC # BLD: 3.04 M/UL (ref 4–5.2)
SEDIMENTATION RATE, ERYTHROCYTE: 100 MM (ref 0–20)
SODIUM BLD-SCNC: 135 MMOL/L (ref 135–144)
TOTAL PROTEIN: 4.4 G/DL (ref 6.4–8.3)
WBC # BLD: 9.2 K/UL (ref 3.5–11)

## 2019-04-28 PROCEDURE — 84100 ASSAY OF PHOSPHORUS: CPT

## 2019-04-28 PROCEDURE — 2500000003 HC RX 250 WO HCPCS: Performed by: SURGERY

## 2019-04-28 PROCEDURE — 80053 COMPREHEN METABOLIC PANEL: CPT

## 2019-04-28 PROCEDURE — 86140 C-REACTIVE PROTEIN: CPT

## 2019-04-28 PROCEDURE — 97530 THERAPEUTIC ACTIVITIES: CPT

## 2019-04-28 PROCEDURE — 6370000000 HC RX 637 (ALT 250 FOR IP): Performed by: INTERNAL MEDICINE

## 2019-04-28 PROCEDURE — 94640 AIRWAY INHALATION TREATMENT: CPT

## 2019-04-28 PROCEDURE — 2060000000 HC ICU INTERMEDIATE R&B

## 2019-04-28 PROCEDURE — 6360000002 HC RX W HCPCS: Performed by: INTERNAL MEDICINE

## 2019-04-28 PROCEDURE — 2700000000 HC OXYGEN THERAPY PER DAY

## 2019-04-28 PROCEDURE — 84145 PROCALCITONIN (PCT): CPT

## 2019-04-28 PROCEDURE — 6360000002 HC RX W HCPCS: Performed by: STUDENT IN AN ORGANIZED HEALTH CARE EDUCATION/TRAINING PROGRAM

## 2019-04-28 PROCEDURE — 85610 PROTHROMBIN TIME: CPT

## 2019-04-28 PROCEDURE — 2580000003 HC RX 258: Performed by: INTERNAL MEDICINE

## 2019-04-28 PROCEDURE — 85027 COMPLETE CBC AUTOMATED: CPT

## 2019-04-28 PROCEDURE — 97110 THERAPEUTIC EXERCISES: CPT

## 2019-04-28 PROCEDURE — 83735 ASSAY OF MAGNESIUM: CPT

## 2019-04-28 PROCEDURE — 36415 COLL VENOUS BLD VENIPUNCTURE: CPT

## 2019-04-28 PROCEDURE — 99233 SBSQ HOSP IP/OBS HIGH 50: CPT | Performed by: INTERNAL MEDICINE

## 2019-04-28 PROCEDURE — C9113 INJ PANTOPRAZOLE SODIUM, VIA: HCPCS | Performed by: INTERNAL MEDICINE

## 2019-04-28 PROCEDURE — 94761 N-INVAS EAR/PLS OXIMETRY MLT: CPT

## 2019-04-28 PROCEDURE — 2500000003 HC RX 250 WO HCPCS: Performed by: STUDENT IN AN ORGANIZED HEALTH CARE EDUCATION/TRAINING PROGRAM

## 2019-04-28 PROCEDURE — 6370000000 HC RX 637 (ALT 250 FOR IP): Performed by: STUDENT IN AN ORGANIZED HEALTH CARE EDUCATION/TRAINING PROGRAM

## 2019-04-28 PROCEDURE — 85651 RBC SED RATE NONAUTOMATED: CPT

## 2019-04-28 RX ADMIN — CLOPIDOGREL BISULFATE 75 MG: 75 TABLET ORAL at 09:35

## 2019-04-28 RX ADMIN — METOCLOPRAMIDE 10 MG: 5 INJECTION, SOLUTION INTRAMUSCULAR; INTRAVENOUS at 12:09

## 2019-04-28 RX ADMIN — Medication 10 ML: at 09:36

## 2019-04-28 RX ADMIN — POTASSIUM CHLORIDE: 2 INJECTION, SOLUTION, CONCENTRATE INTRAVENOUS at 18:01

## 2019-04-28 RX ADMIN — ENOXAPARIN SODIUM 40 MG: 100 INJECTION SUBCUTANEOUS at 09:35

## 2019-04-28 RX ADMIN — LEVALBUTEROL 1.25 MG: 1.25 SOLUTION, CONCENTRATE RESPIRATORY (INHALATION) at 08:10

## 2019-04-28 RX ADMIN — METOCLOPRAMIDE 10 MG: 5 INJECTION, SOLUTION INTRAMUSCULAR; INTRAVENOUS at 01:47

## 2019-04-28 RX ADMIN — VENLAFAXINE 37.5 MG: 37.5 TABLET ORAL at 21:09

## 2019-04-28 RX ADMIN — I.V. FAT EMULSION 100 ML: 20 EMULSION INTRAVENOUS at 18:01

## 2019-04-28 RX ADMIN — METOPROLOL TARTRATE 12.5 MG: 25 TABLET ORAL at 09:35

## 2019-04-28 RX ADMIN — LEVALBUTEROL 1.25 MG: 1.25 SOLUTION, CONCENTRATE RESPIRATORY (INHALATION) at 11:43

## 2019-04-28 RX ADMIN — IPRATROPIUM BROMIDE 0.5 MG: 0.5 SOLUTION RESPIRATORY (INHALATION) at 15:11

## 2019-04-28 RX ADMIN — LEVALBUTEROL 1.25 MG: 1.25 SOLUTION, CONCENTRATE RESPIRATORY (INHALATION) at 15:11

## 2019-04-28 RX ADMIN — ASPIRIN 81 MG: 81 TABLET, COATED ORAL at 09:35

## 2019-04-28 RX ADMIN — INSULIN LISPRO 2 UNITS: 100 INJECTION, SOLUTION INTRAVENOUS; SUBCUTANEOUS at 12:09

## 2019-04-28 RX ADMIN — VENLAFAXINE 37.5 MG: 37.5 TABLET ORAL at 18:04

## 2019-04-28 RX ADMIN — IPRATROPIUM BROMIDE 0.5 MG: 0.5 SOLUTION RESPIRATORY (INHALATION) at 11:43

## 2019-04-28 RX ADMIN — PREDNISONE 20 MG: 20 TABLET ORAL at 09:34

## 2019-04-28 RX ADMIN — VENLAFAXINE 37.5 MG: 37.5 TABLET ORAL at 10:27

## 2019-04-28 RX ADMIN — METOCLOPRAMIDE 10 MG: 5 INJECTION, SOLUTION INTRAMUSCULAR; INTRAVENOUS at 06:48

## 2019-04-28 RX ADMIN — FLUCONAZOLE 200 MG: 100 TABLET ORAL at 09:34

## 2019-04-28 RX ADMIN — PANTOPRAZOLE SODIUM 40 MG: 40 INJECTION, POWDER, FOR SOLUTION INTRAVENOUS at 09:34

## 2019-04-28 RX ADMIN — IPRATROPIUM BROMIDE 0.5 MG: 0.5 SOLUTION RESPIRATORY (INHALATION) at 08:10

## 2019-04-28 RX ADMIN — METOCLOPRAMIDE 10 MG: 5 INJECTION, SOLUTION INTRAMUSCULAR; INTRAVENOUS at 21:09

## 2019-04-28 RX ADMIN — METOPROLOL TARTRATE 12.5 MG: 25 TABLET ORAL at 21:09

## 2019-04-28 RX ADMIN — Medication 10 ML: at 09:34

## 2019-04-28 RX ADMIN — OXYCODONE AND ACETAMINOPHEN 1 TABLET: 5; 325 TABLET ORAL at 14:47

## 2019-04-28 ASSESSMENT — PAIN SCALES - GENERAL
PAINLEVEL_OUTOF10: 7
PAINLEVEL_OUTOF10: 6

## 2019-04-28 ASSESSMENT — ENCOUNTER SYMPTOMS
CONSTIPATION: 0
EYE REDNESS: 0
SORE THROAT: 0
DIARRHEA: 0
ABDOMINAL PAIN: 1
NAUSEA: 0
SHORTNESS OF BREATH: 0
COUGH: 0
VOMITING: 0

## 2019-04-28 NOTE — PROGRESS NOTES
educated on the importance of repositioning to prevent skin breakdown/bed sores. Other exercises  Other exercises?: Yes  Other exercises 1: PROM/AAROM to R UE , 1x10   Other exercises 2: Pt refuses to complete AAROM with RLE/LUE/LLE (AP only d/t Long leg cast). Refused to reposition despite education. Other exercises 3: Educated pt on the importance of stregnth training/AAROM, repositioning, and functional mobility. Assessment   Body structures, Functions, Activity limitations: Decreased functional mobility ; Decreased ADL status; Decreased strength; Increased Pain  Assessment: pain limiting ROM exercises  Specific instructions for Next Treatment: progress to mobility as able  Patient Education: POC  REQUIRES PT FOLLOW UP: Yes  Activity Tolerance  Activity Tolerance: Patient limited by pain; Patient Tolerated treatment well  Activity Tolerance: patient reporting \"I'm done\" after completing a few exercises on one extremity        Goals  Short term goals  Time Frame for Short term goals: 10 visits  Short term goal 1: improve strength RUE/RLE to 4/5 to assist in bed mobility and transfers  Short term goal 2: improve bed mobility to minx1  Short term goal 3: improve transfers to minx1  Short term goal 4: progress to Gait and/or use of wheelchair for mobility  Patient Goals   Patient goals : return home    Plan    Plan  Times per week: 5-7x/wk  Times per day: Daily  Specific instructions for Next Treatment: progress to mobility as able  Current Treatment Recommendations: ROM, Strengthening, Functional Mobility Training, Transfer Training  Plan Comment: to continue PT per POC  Safety Devices  Type of devices: Call light within reach, Left in bed  Restraints  Initially in place: Yes  Restraints: B wrist restraint     Therapy Time   Individual Concurrent Group Co-treatment   Time In 0827         Time Out 0850         Minutes Brooke Kapadia. [address] [place] [address]

## 2019-04-28 NOTE — PROGRESS NOTES
Per Dr. Julianne Low request, Shruthi Woods spoke with resident regarding patient's PT and platelet level in regards to current ordered medication.

## 2019-04-28 NOTE — PROGRESS NOTES
General Surgery Progress Note  POD# 0    Subjective:     Patient is stable she denies any nausea or vomiting tolerating her diet fairly well     Scheduled Meds:   metoprolol tartrate  12.5 mg Oral BID    fluconazole  200 mg Oral Daily    predniSONE  20 mg Oral Daily    metoclopramide  10 mg Intravenous Q6H    fat emulsion  100 mL Intravenous Daily    [Held by provider] furosemide  40 mg Intravenous Daily    magnesium sulfate  1 g Intravenous Once    pantoprazole  40 mg Intravenous Daily    And    sodium chloride (PF)  10 mL Intravenous Daily    sodium chloride  250 mL Intravenous Once    ipratropium  0.5 mg Nebulization Q4H    insulin lispro  0-12 Units Subcutaneous Q6H    levalbuterol  1.25 mg Nebulization Q4H    venlafaxine  37.5 mg Oral TID    clopidogrel  75 mg Oral Daily    aspirin  81 mg Oral Daily    sodium chloride flush  10 mL Intravenous 2 times per day    enoxaparin  40 mg Subcutaneous Daily     Continuous Infusions:   PN-Adult 2-in-1 Central Line (Standard)      PN-Adult 2-in-1 Central Line (Standard) 65 mL/hr at 04/27/19 1714    sodium chloride 10 mL/hr at 04/26/19 1407    norepinephrine Stopped (04/26/19 0730)    dextrose       PRN Meds:hydrOXYzine, metoprolol, sodium chloride nebulizer, fentanNYL, oxyCODONE-acetaminophen, magnesium sulfate, sodium phosphate IVPB **OR** sodium phosphate IVPB, potassium chloride **OR** potassium alternative oral replacement **OR** potassium chloride, glucose, dextrose, glucagon (rDNA), dextrose, milk and molasses, sodium chloride flush, acetaminophen    Objective:   /60   Pulse 90   Temp 98.2 °F (36.8 °C) (Oral)   Resp 19   Ht 5' (1.524 m)   Wt 132 lb 7.9 oz (60.1 kg)   SpO2 96%   BMI 25.88 kg/m²     I/O last 3 completed shifts:   In: 1787.4 [I.V.:244.2]  Out: 2665 [Urine:2300; Emesis/NG output:365]    Recent Results (from the past 24 hour(s))   POC Glucose Fingerstick    Collection Time: 04/27/19  4:39 PM   Result Value Ref Range POC Glucose 103 65 - 105 mg/dL   POC Glucose Fingerstick    Collection Time: 04/27/19 11:21 PM   Result Value Ref Range    POC Glucose 105 65 - 105 mg/dL   Comprehensive Metabolic Panel w/ Reflex to MG    Collection Time: 04/28/19  5:07 AM   Result Value Ref Range    Glucose 106 (H) 70 - 99 mg/dL    BUN 19 8 - 23 mg/dL    CREATININE <0.40 (L) 0.50 - 0.90 mg/dL    Bun/Cre Ratio NOT REPORTED 9 - 20    Calcium 7.1 (L) 8.6 - 10.4 mg/dL    Sodium 135 135 - 144 mmol/L    Potassium 4.5 3.7 - 5.3 mmol/L    Chloride 104 98 - 107 mmol/L    CO2 23 20 - 31 mmol/L    Anion Gap 8 (L) 9 - 17 mmol/L    Alkaline Phosphatase 86 35 - 104 U/L    ALT 15 5 - 33 U/L    AST 20 <32 U/L    Total Bilirubin 0.59 0.3 - 1.2 mg/dL    Total Protein 4.4 (L) 6.4 - 8.3 g/dL    Alb 1.4 (L) 3.5 - 5.2 g/dL    Albumin/Globulin Ratio NOT REPORTED 1.0 - 2.5    GFR Non- CANNOT BE CALCULATED >60 mL/min    GFR  CANNOT BE CALCULATED >60 mL/min    GFR Comment          GFR Staging NOT REPORTED    CBC    Collection Time: 04/28/19  5:07 AM   Result Value Ref Range    WBC 9.2 3.5 - 11.0 k/uL    RBC 3.04 (L) 4.0 - 5.2 m/uL    Hemoglobin 9.0 (L) 12.0 - 16.0 g/dL    Hematocrit 27.0 (L) 36 - 46 %    MCV 88.9 80 - 100 fL    MCH 29.5 26 - 34 pg    MCHC 33.2 31 - 37 g/dL    RDW 15.2 (H) 11.5 - 14.9 %    Platelets 87 (L) 578 - 450 k/uL    MPV 8.9 6.0 - 12.0 fL    NRBC Automated NOT REPORTED per 100 WBC   Protime-INR    Collection Time: 04/28/19  5:07 AM   Result Value Ref Range    Protime 17.3 (H) 11.8 - 14.6 sec    INR 1.4    C-Reactive Protein    Collection Time: 04/28/19  5:07 AM   Result Value Ref Range    .1 (H) 0.0 - 5.0 mg/L   Sedimentation Rate    Collection Time: 04/28/19  5:07 AM   Result Value Ref Range    Sed Rate 100 (H) 0 - 20 mm   Procalcitonin    Collection Time: 04/28/19  5:07 AM   Result Value Ref Range    Procalcitonin 0.19 (H) <0.09 ng/mL   Magnesium    Collection Time: 04/28/19  5:07 AM   Result Value Ref Range Magnesium 1.7 1.6 - 2.6 mg/dL   Phosphorus, Inorg. Collection Time: 04/28/19  5:07 AM   Result Value Ref Range    Phosphorus 3.3 2.6 - 4.5 mg/dL   POC Glucose Fingerstick    Collection Time: 04/28/19  7:01 AM   Result Value Ref Range    POC Glucose 112 (H) 65 - 105 mg/dL   POC Glucose Fingerstick    Collection Time: 04/28/19 11:06 AM   Result Value Ref Range    POC Glucose 156 (H) 65 - 105 mg/dL     Abdominal examination is negative for any tenderness but bowel sounds are present she still has minimal drainage from her tube on the right side. ProTime is still high and her platelet count is low      Assessment/Plan:   1. Will suggest to repeat the PT and the INR if he still though she might be a candidate for 1 unit of fresh frozen plasma.     Electronically signed by Michelle Nevarez MD  on 4/28/2019 at 3:00 PM

## 2019-04-28 NOTE — PROGRESS NOTES
[address] [facility].    PROGRESS NOTE             4/28/2019    7:34 AM    Name:   Mono Membreno  MRN:     [id number]     Acct:      [de-identified]   Room:   [address] [place] [address] Day:  16  Admit Date:  4/11/2019  2:48 PM    PCP:  Willis Cockayne, DO  Code Status:  Full Code    Subjective: Interval History Status: improved. Patient seen and examined at bedside. No complaints overnight. Acknowledges some diffuse abdominal tenderness on light palpation. Brief History:     Per EMR:     The patient is a 77 y.o.  Female who presents withAbdominal Pain   and she is admitted to the hospital for the management of abdominal distension, nausea, vomiting, and acute cystitis.      Patient presents with chills, nausea, vomiting, abdominal pain (diffuse, but worse in the suprapubic region), abdominal distension, and dysuria of 2-3 days duration. Denies hematemesis. Acknowledges difficulty keeping food down but tolerating fluids. States abdominal pain is a 6/10 on average, and denies trying any medication to alleviate her sx.      Denies recent antibiotic use or steroid use.      ED: Tachycardic 100-114 BP, WBC 47.9 w/neutrophils 88,  UCx from 4/2 + E. Coli > 100,000 w/suceptibility to cipro. Review of Systems:     Review of Systems   Constitutional: Negative for chills and fever. HENT: Negative for sore throat. Eyes: Negative for redness. Respiratory: Negative for cough and shortness of breath. Cardiovascular: Negative for chest pain and leg swelling. Gastrointestinal: Positive for abdominal pain. Negative for constipation, diarrhea, nausea and vomiting. Genitourinary: Negative for dysuria and hematuria. Musculoskeletal: Negative for arthralgias. Skin: Negative for rash. Neurological: Negative for headaches. Hematological: Negative for adenopathy. Medications: Allergies:     Allergies   Allergen Reactions    reviewed. No pertinent family history. Vitals:  /70   Pulse 110   Temp 99.3 °F (37.4 °C) (Axillary)   Resp 18   Ht 5' (1.524 m)   Wt 132 lb 7.9 oz (60.1 kg)   SpO2 95%   BMI 25.88 kg/m²   Temp (24hrs), Av.9 °F (37.2 °C), Min:98.2 °F (36.8 °C), Max:99.3 °F (37.4 °C)    Recent Labs     19  1800 19  1138 19  1639 19  2321   POCGLU 102 108* 103 105       I/O(24Hr): Intake/Output Summary (Last 24 hours) at 2019 0734  Last data filed at 2019 7433  Gross per 24 hour   Intake 1787.35 ml   Output 2665 ml   Net -877.65 ml       Labs:    [unfilled]    Lab Results   Component Value Date/Time    SPECIAL NOT REPORTED 2019 10:10 PM     Lab Results   Component Value Date/Time    CULTURE (A) 2019 04:59 PM     YEAST, NOT CANDIDA ALBICANS OR CANDIDA DUBLINIENSIS SCANT GROWTH    CULTURE (A) 2019 04:59 PM     KLEBSIELLA PNEUMONIAE ONE COLONY FORMING UNIT THIS ORGANISM IS AN EXTENDED-SPECTRUM BETA-LACTAMASE  AND RESISTANCE TO THERAPY WITH PENICILLINS, CEPHALOSPORINS AND AZTREONAM IS EXPECTED. THESE ORGANISMS GENERALLY REMAIN SUSCEPTIBLE TO CARBAPENEMS. CONSIDER ID CONSULTATION. CULTURE NO ANAEROBIC ORGANISMS ISOLATED AT 5 DAYS (A) 2019 04:59 PM       [unfilled]    Radiology:    William Mcrae Femur Left (min 2 Views)    Result Date: 3/27/2019  EXAMINATION: 4 XRAY VIEWS OF THE LEFT FEMUR; 2 XRAY VIEWS OF THE LEFT KNEE; 3 XRAY VIEWS OF THE LEFT TIBIA AND FIBULA 3/27/2019 7:00 pm COMPARISON: Left hip 2015. HISTORY: ORDERING SYSTEM PROVIDED HISTORY: pain TECHNOLOGIST PROVIDED HISTORY: pain Ordering Physician Provided Reason for Exam: pt twisted wrong, lt knee pain, unable to straighten knee. Acuity: Acute Type of Exam: Initial FINDINGS: Left femur, four views: The bones are diffusely osteopenic. No acute fracture deformity. The hip joint is maintained. The femoral head projects within the acetabulum. No focal soft tissue abnormality is seen.  Left COMPARISON: March 27, 2018 HISTORY: ORDERING SYSTEM PROVIDED HISTORY: F/u L knee pain after cast TECHNOLOGIST PROVIDED HISTORY: AP, oblique, lateral F/u L knee pain after cast FINDINGS: Marked osteopenia. Interval casting, which degrades fine osseous detail question cortical offset in the lateral femoral metaphysis. No malalignment identified. No significant joint effusion. Interval casting. Question nondisplaced fracture in the lateral femoral metaphysis. Osteopenia. Xr Tibia Fibula Left (2 Views)    Result Date: 3/27/2019  EXAMINATION: 4 XRAY VIEWS OF THE LEFT FEMUR; 2 XRAY VIEWS OF THE LEFT KNEE; 3 XRAY VIEWS OF THE LEFT TIBIA AND FIBULA 3/27/2019 7:00 pm COMPARISON: Left hip 11/14/2015. HISTORY: ORDERING SYSTEM PROVIDED HISTORY: pain TECHNOLOGIST PROVIDED HISTORY: pain Ordering Physician Provided Reason for Exam: pt twisted wrong, lt knee pain, unable to straighten knee. Acuity: Acute Type of Exam: Initial FINDINGS: Left femur, four views: The bones are diffusely osteopenic. No acute fracture deformity. The hip joint is maintained. The femoral head projects within the acetabulum. No focal soft tissue abnormality is seen. Left knee, two views: The knee is flexed. No acute osseous abnormality. No joint effusion. Joint spaces are not optimally profiled but no significant arthritic changes are apparent. Left tib-fib, three views: There is evidence of a remote healed distal tibia fracture. No acute fracture or dislocation is seen. No focal soft tissue abnormality. No acute osseous abnormality of the left femur. No acute osseous abnormality of the left knee. No acute osseous abnormality of the left tib-fib. Healed remote distal tibia fracture. Marked osteopenia, likely due to disuse.      Ct Abdomen Pelvis W Iv Contrast    Result Date: 4/11/2019  EXAMINATION: CT OF THE ABDOMEN AND PELVIS WITH CONTRAST 4/11/2019 5:14 pm TECHNIQUE: CT of the abdomen and pelvis was performed with the in the hips and lower lumbar facets. Estimated biologic radiation dose for this procedure:258.77 mGy/cm2.     1. Dilatation of the thoracic esophagus filled with fluid. No obstructive process is noted. No adjacent enlarged lymph nodes. 2. Trace pleural fluid. 3. Marked distention of the stomach. This appears to extend to the pylorus. Partial gastric outlet obstruction is not excluded. 4. Bilateral dilated ureters without obstructing calculi. Urinary bladder is markedly distended with bladder wall air suggesting emphysematous cystitis. Dilatation of the ureters may be related to reflux or infection. 5. Atherosclerotic disease. 6. Other findings as above. Critical results were called by Dr. Benito Emerson MD to [address] on 4/11/2019 at 17:37. Xr Chest Portable    Result Date: 4/12/2019  EXAMINATION: SINGLE XRAY VIEW OF THE CHEST 4/12/2019 5:26 am COMPARISON: November 14, 2015. HISTORY: ORDERING SYSTEM PROVIDED HISTORY: line placement TECHNOLOGIST PROVIDED HISTORY: line placement Ordering Physician Provided Reason for Exam: New right side line placement. Acuity: Acute Type of Exam: Initial Additional signs and symptoms: New right side line placement. FINDINGS: Stable left pectoral trans venous cardiac pacer device. New right IJ central venous catheter with tip near the superior atrial caval junction. Normal lung volume. No new consolidation. Curvilinear radiopacity projecting over the right upper lobe likely represents artifact from a skin fold. No pleural effusion or pneumothorax. Stable cardiomediastinal silhouette and great vessels with redemonstration of atherosclerotic thoracic aorta. New right IJ central venous catheter with tip near the superior atrial caval junction. No pneumothorax. No new consolidation. Physical Examination:        Physical Exam   Constitutional: She appears well-developed. She appears cachectic. No distress. HENT:   Head: Normocephalic and atraumatic.    Eyes: 80 --> 100   Diflucan   Flagyl   ECHO 4/12: LVEF 45%, RVSP 36 mmHg   Urology consult, appreciate recs --> cont cath until out of ICU and stable   Critical care consult, appreciate recs   Gen Surg, right IJ central line placed on 4/12, cholecystotomy tube 4/22   ID consult, appreciate recs   Lasix HELD --> likely to restart tomorrow    Sinus Tachycardia   HR 90s - 110s   Hx of CVA, and bradycardia w/pacemaker   Lopressor 12.5 BID and monitor    Acute Cholecystitis   GS consulted - appreciate recommendations   Surgical intervention - cholecystomy tube placed 4/22/19    Gastroparesis   EGD: esophagitis, hiatal hernia, solid food in 75% stomach    Pepcid switched to Protonix, per GI recs   Reglan started on 4/17 --> Daily ECG   Considering systemic autonomic dysfunction as overlying diagnosis (gastroparesis, neurogenic bladder,  hypotension/fluctuating BPs)    TPN   CLD --> GS to advance    Anemia, likely 2/2 bleeding   Transfusion 1 U RBC on 4/14   Transfusion 2 U RBC on 4/16    Restarted Lovenox, INR wNL    Thrombocytopenia   Plts 87, max 275 during this admission   Continue lovenox   Will monitor plts    Hypokalemia   Potassium sliding scale    Hypophosphatemia   Sodium phosphate to correct    Left knee remote distal tibia fx w/osteopenia   Ortho replaced brace    Maintenance   Lovenox   Dulera, Xopenex   Continue home meds trazodone, ASA, Plavix, Norvasc, Effexor, Spiriva     Dispo   PT/OT Eval and treat   Social work for [address] to progressive    Last Martin MD  4/28/2019  7:34 AM       Attestation and add on       I have discussed the care of Jonas Grady , including pertinent history and exam findings,    today with the resident. I have seen and examined the patient and the key elements of all parts of the encounter have been performed by me . I agree with the assessment, plan and orders as documented by the resident.      Principal Problem:    Sepsis due to urinary tract infection Reviewed ,  .    [] Cardiology           [] Nephrology  []. Hemo onco  [] GI         [] ID      [x] Pulmonary      [] Neuro       [] ---         Following input needed ;   Principal Problem:    Sepsis due to urinary tract infection ([facility] [address]  Active Problems:    Cystitis    Emphysematous cystitis    Septic shock (HCC)    Leukemoid reaction    Aspiration pneumonia of right lower lobe due to vomit (HCC)    MRSA carrier    Urinary retention    Abdominal distention    Elevated CEA    Elevated CA 19-9 level    Cholecystitis    CRP elevated    Elevated procalcitonin    Bandemia  Resolved Problems:    * No resolved hospital problems. *  Continued good urinary output  Oral steroid     Plan:  Continue oral steroids  Increase activity  Repeat chest x-ray in a.m. [address], [address] [place] [address].    Phone [phone]   Fax: [phone]  Answering Service: [phone]

## 2019-04-28 NOTE — PROGRESS NOTES
Nutrition Assessment (Parenteral Nutrition)    Type and Reason for Visit: Reassess    Nutrition Recommendations: Following changes made in additives: KCl- 30 to 20 mEq, Mg- 15 to 17 mEq, question if pt will require home TPN until surgery can be done. Nutrition Assessment: Pt remains stable from a nutritional standpoint, tolerating clear liquid diet but not eating much; TPN & lipids to continue. Malnutrition Assessment:  · Malnutrition Status: At risk for malnutrition  · Context: Acute illness or injury  · Findings of the 6 clinical characteristics of malnutrition (Minimum of 2 out of 6 clinical characteristics is required to make the diagnosis of moderate or severe Protein Calorie Malnutrition based on AND/ASPEN Guidelines):  1. Energy Intake-Less than or equal to 75% of estimated energy requirement(Previously; improving with parenteral nutrition), Greater than or equal to 5 days    2. Weight Loss-Unable to assess(edema present),    3. Fat Loss-Unable to assess,    4. Muscle Loss-Unable to assess,    5. Fluid Accumulation-(moderate fluid accumulation), Extremities  6.  Strength-Not measured    Nutrition Risk Level: High    Nutrient Needs:  · Estimated Daily Total Kcal: 3530-7825 based on wt of 25-27 per kg using wt of 57.2 kg  · Estimated Daily Protein (g): 63-68 based on 1.4-1.5 gm/kg IBW(revised)    Nutrition Diagnosis:   · Problem: Inadequate oral intake  · Etiology: related to Alteration in GI function, Insufficient energy/nutrient consumption     Signs and symptoms:  as evidenced by Nutrition support - PN    Objective Information:  · Nutrition-Focused Physical Findings: Edema: +2 pitting LUE, +3 pitting LLE, +1 RUE, RLE.  GI: bowel sounds hypoactive, passing flatus  · Wound Type: Stage I, Pressure Ulcer(Stage pressure ulcer on buttocks; thigh wound)  · Current Nutrition Therapies:  · Oral Diet Orders: Clear Liquid   · Oral Diet intake: 1-25%  · Oral Nutrition Supplement (ONS) Orders: Clear Liquid Oral Supplement  · ONS intake: 51-75%, 26-50%(Variable)  · Parenteral Nutrition Orders:  · Type and Formula: Premix Central, 2-in-1 Custom   · Lipids: 100ml, Daily  · Rate/Volume: 65 ml/ 1560 ml daily  · Duration: Continuous  · Current PN Order Provides: 1573 kcals, 78 gm of protein (lipids included)  · Goal PN Orders Provides: 6279-6049 kcal; 63-68 gm pro  · Additional Calories: None  · Anthropometric Measures:  · Ht: 5' (152.4 cm)   · Current Body Wt: 132 lb 7.9 oz (60.1 kg)  · Admission Body Wt: 102 lb (46.3 kg)  · Ideal Body Wt: 100 lb (45.4 kg), % Ideal Body 102% based on admission wt  · BMI Classification: BMI 25.0 - 29.9 Overweight    Nutrition Interventions:   Continue current diet, Continue current ONS, Modify current Parenteral Nutrition  Continued Inpatient Monitoring, Education not appropriate at this time    Nutrition Evaluation:   · Evaluation: Progressing toward goals   · Goals: PN to meet greater 90% of estimated nutrition needs   · Monitoring: Nutrition Progression, Meal Intake, Supplement Intake, Diet Tolerance, PN Intake, PN Tolerance, Wound Healing, I&O, Weight, Pertinent Labs, Diarrhea, Nausea or Vomiting, Monitor Bowel Function      SHITAL Rosas R.D..   Clinical Dietitian  Office: [phone]

## 2019-04-28 NOTE — CARE COORDINATION
ONGOING DISCHARGE PLAN:    Spoke with patient regarding discharge plan and patient is from [address] [place] [address] and DOES NOT want to go back there      Wants to talk with a LSW  On Monday about different places  She is not happy with the idea of going somewhere as she prefers to go home  She is  agreeable to go to an ECF as she knows she needs assistance 24/7    PT is recommending ECF and she does not have help 24/7 at home. Patient remains on TPN, Lipids and clear liquid diet. LSW to follow for possible LTACH or ECF placement, Will need a pre cert    · IR placed cholecystostomy on 4/22/19, GB fluid  + for C non albicans and one colonie kleb ESBL      Will continue to follow for additional discharge needs.     Electronically signed by Maximiliano Tapia RN on 4/28/2019 at 1:48 PM

## 2019-04-28 NOTE — PLAN OF CARE
Problem: Risk for Impaired Skin Integrity  Goal: Tissue integrity - skin and mucous membranes  Description  Structural intactness and normal physiological function of skin and  mucous membranes.   Outcome: Ongoing     Problem: Falls - Risk of:  Goal: Will remain free from falls  Description  Will remain free from falls  Outcome: Ongoing     Problem: Falls - Risk of:  Goal: Absence of physical injury  Description  Absence of physical injury  Outcome: Ongoing

## 2019-04-28 NOTE — PROGRESS NOTES
Pulmonary Progress Note  Pulmonary and Critical Care Specialists      Patient - Chloe Alexander,  Age - 77 y.o.    - 1950      Room Number - 2123/2123-01   N -  [id number]   [facility]  Date of Admission -  2019  2:48 PM        Consulting Alla Bush MD  Primary Care Physician - Macy Laguna, DO     SUBJECTIVE   Comfortable in bed, taking clear liquids, on oxygen,    OBJECTIVE   VITALS    height is 5' (1.524 m) and weight is 132 lb 7.9 oz (60.1 kg). Her axillary temperature is 99 °F (37.2 °C). Her blood pressure is 119/57 (abnormal) and her pulse is 109. Her respiration is 19 and oxygen saturation is 95%. Body mass index is 25.88 kg/m². Temperature Range: Temp: 99 °F (37.2 °C) Temp  Av.1 °F (37.3 °C)  Min: 99 °F (37.2 °C)  Max: 99.3 °F (37.4 °C)  BP Range:  Systolic (05YDP), KZM:415 , Min:112 , XGJ:558     Diastolic (24SWI), MOS:84, Min:57, Max:97    Pulse Range: Pulse  Av  Min: 107  Max: 116  Respiration Range: Resp  Av.9  Min: 18  Max: 29  Current Pulse Ox[de-identified]  SpO2: 95 %  24HR Pulse Ox Range:  SpO2  Av.3 %  Min: 94 %  Max: 100 %  Oxygen Amount and Delivery: O2 Flow Rate (L/min): 2 L/min    Wt Readings from Last 3 Encounters:   19 132 lb 7.9 oz (60.1 kg)   19 89 lb (40.4 kg)   19 94 lb 1.6 oz (42.7 kg)       I/O (24 Hours)    Intake/Output Summary (Last 24 hours) at 2019 1137  Last data filed at 2019 1024  Gross per 24 hour   Intake 1907.35 ml   Output 2665 ml   Net -757.65 ml       EXAM     General Appearance  Awake, alert, oriented, in no acute distress, thin  HEENT - normocephalic, atraumatic.  []  Mallampati  [] Crowded airway   [] Macroglossia  []  Retrognathia  [] Micrognathia  []  Normal tongue size []  Normal Bite  [] Tillman sign positive    Neck - Supple,  trachea midline   Lungs - clear lung fields bilaterally, no rales no rhonchi  Cardiovascular - Heart sounds are normal.  Regular rate and rhythm   Abdomen - Soft, nontender, nondistended, no masses or organomegaly  Neurologic - There are no focal motor or sensory deficits  Skin - No bruising or bleeding  Extremities - No clubbing, cyanosis, edema    MEDS      metoprolol tartrate  12.5 mg Oral BID    fluconazole  200 mg Oral Daily    predniSONE  20 mg Oral Daily    metoclopramide  10 mg Intravenous Q6H    fat emulsion  100 mL Intravenous Daily    [Held by provider] furosemide  40 mg Intravenous Daily    magnesium sulfate  1 g Intravenous Once    pantoprazole  40 mg Intravenous Daily    And    sodium chloride (PF)  10 mL Intravenous Daily    sodium chloride  250 mL Intravenous Once    ipratropium  0.5 mg Nebulization Q4H    insulin lispro  0-12 Units Subcutaneous Q6H    levalbuterol  1.25 mg Nebulization Q4H    venlafaxine  37.5 mg Oral TID    clopidogrel  75 mg Oral Daily    aspirin  81 mg Oral Daily    sodium chloride flush  10 mL Intravenous 2 times per day    enoxaparin  40 mg Subcutaneous Daily      PN-Adult 2-in-1 Central Line (Standard) 65 mL/hr at 04/27/19 1714    sodium chloride 10 mL/hr at 04/26/19 1407    norepinephrine Stopped (04/26/19 0730)    dextrose       hydrOXYzine, metoprolol, sodium chloride nebulizer, fentanNYL, oxyCODONE-acetaminophen, magnesium sulfate, sodium phosphate IVPB **OR** sodium phosphate IVPB, potassium chloride **OR** potassium alternative oral replacement **OR** potassium chloride, glucose, dextrose, glucagon (rDNA), dextrose, milk and molasses, sodium chloride flush, acetaminophen    LABS   CBC   Recent Labs     04/28/19  0507   WBC 9.2   HGB 9.0*   HCT 27.0*   MCV 88.9   PLT 87*     BMP:   Lab Results   Component Value Date     04/28/2019    K 4.5 04/28/2019     04/28/2019    CO2 23 04/28/2019    BUN 19 04/28/2019    LABALBU 1.4 04/28/2019    LABALBU 4.6 05/17/2012    CREATININE <0.40 04/28/2019    CALCIUM 7.1 04/28/2019    GFRAA CANNOT BE CALCULATED 04/28/2019    LABGLOM CANNOT BE CALCULATED 04/28/2019     ABGs:  Lab Results   Component Value Date    PHART 7.519 04/19/2019    PO2ART 73.3 04/19/2019    PWC7RAD 42.5 04/19/2019      Lab Results   Component Value Date    MODE PRVC 04/19/2019     Ionized Calcium:  No results found for: IONCA  Magnesium:    Lab Results   Component Value Date    MG 1.7 04/28/2019     Phosphorus:    Lab Results   Component Value Date    PHOS 3.3 04/28/2019        LIVER PROFILE   Recent Labs     04/28/19  0507   AST 20   ALT 15   BILITOT 0.59   ALKPHOS 86     INR   Recent Labs     04/28/19  0507   INR 1.4     PTT   Lab Results   Component Value Date    APTT 27.7 03/28/2012         RADIOLOGY     (See actual reports for details)    ASSESSMENT/PLAN   Principal Problem:    Sepsis due to urinary tract infection ([address]  Active Problems:    Cystitis    Emphysematous cystitis    Septic shock ([address]    Leukemoid reaction    Aspiration pneumonia of right lower lobe due to vomit (HCC)    MRSA carrier    Urinary retention    Abdominal distention    Elevated CEA    Elevated CA 19-9 level    Cholecystitis    CRP elevated    Elevated procalcitonin    Bandemia  Resolved Problems:    * No resolved hospital problems. *  Continued good urinary output  Oral steroid    Plan:  Continue oral steroids  Increase activity  Repeat chest x-ray in a.m.       Electronically signed by Nirav Duran MD on 4/28/2019 at 11:37 AM

## 2019-04-29 ENCOUNTER — APPOINTMENT (OUTPATIENT)
Dept: GENERAL RADIOLOGY | Age: 69
DRG: 853 | End: 2019-04-29
Payer: COMMERCIAL

## 2019-04-29 LAB
ALBUMIN SERPL-MCNC: 1.5 G/DL (ref 3.5–5.2)
ALBUMIN/GLOBULIN RATIO: ABNORMAL (ref 1–2.5)
ALP BLD-CCNC: 94 U/L (ref 35–104)
ALT SERPL-CCNC: 15 U/L (ref 5–33)
ANION GAP SERPL CALCULATED.3IONS-SCNC: 9 MMOL/L (ref 9–17)
AST SERPL-CCNC: 17 U/L
BILIRUB SERPL-MCNC: 0.43 MG/DL (ref 0.3–1.2)
BUN BLDV-MCNC: 22 MG/DL (ref 8–23)
BUN/CREAT BLD: ABNORMAL (ref 9–20)
C-REACTIVE PROTEIN: 86 MG/L (ref 0–5)
CALCIUM SERPL-MCNC: 7.4 MG/DL (ref 8.6–10.4)
CHLORIDE BLD-SCNC: 101 MMOL/L (ref 98–107)
CO2: 22 MMOL/L (ref 20–31)
CREAT SERPL-MCNC: <0.4 MG/DL (ref 0.5–0.9)
GFR AFRICAN AMERICAN: ABNORMAL ML/MIN
GFR NON-AFRICAN AMERICAN: ABNORMAL ML/MIN
GFR SERPL CREATININE-BSD FRML MDRD: ABNORMAL ML/MIN/{1.73_M2}
GFR SERPL CREATININE-BSD FRML MDRD: ABNORMAL ML/MIN/{1.73_M2}
GLUCOSE BLD-MCNC: 119 MG/DL (ref 65–105)
GLUCOSE BLD-MCNC: 126 MG/DL (ref 65–105)
GLUCOSE BLD-MCNC: 126 MG/DL (ref 70–99)
GLUCOSE BLD-MCNC: 137 MG/DL (ref 65–105)
HCT VFR BLD CALC: 26.9 % (ref 36–46)
HEMOGLOBIN: 8.9 G/DL (ref 12–16)
INR BLD: 1.2
MAGNESIUM: 1.8 MG/DL (ref 1.6–2.6)
MCH RBC QN AUTO: 29.7 PG (ref 26–34)
MCHC RBC AUTO-ENTMCNC: 33.2 G/DL (ref 31–37)
MCV RBC AUTO: 89.5 FL (ref 80–100)
NRBC AUTOMATED: ABNORMAL PER 100 WBC
PDW BLD-RTO: 15.7 % (ref 11.5–14.9)
PHOSPHORUS: 3.7 MG/DL (ref 2.6–4.5)
PLATELET # BLD: 84 K/UL (ref 150–450)
PMV BLD AUTO: 8.9 FL (ref 6–12)
POTASSIUM SERPL-SCNC: 4.9 MMOL/L (ref 3.7–5.3)
PROCALCITONIN: 0.15 NG/ML
PROTHROMBIN TIME: 15.3 SEC (ref 11.8–14.6)
RBC # BLD: 3 M/UL (ref 4–5.2)
SEDIMENTATION RATE, ERYTHROCYTE: 105 MM (ref 0–20)
SODIUM BLD-SCNC: 132 MMOL/L (ref 135–144)
TOTAL PROTEIN: 4.6 G/DL (ref 6.4–8.3)
WBC # BLD: 9.4 K/UL (ref 3.5–11)

## 2019-04-29 PROCEDURE — 86140 C-REACTIVE PROTEIN: CPT

## 2019-04-29 PROCEDURE — 2500000003 HC RX 250 WO HCPCS: Performed by: SURGERY

## 2019-04-29 PROCEDURE — 2700000000 HC OXYGEN THERAPY PER DAY

## 2019-04-29 PROCEDURE — 83735 ASSAY OF MAGNESIUM: CPT

## 2019-04-29 PROCEDURE — 93005 ELECTROCARDIOGRAM TRACING: CPT

## 2019-04-29 PROCEDURE — 94761 N-INVAS EAR/PLS OXIMETRY MLT: CPT

## 2019-04-29 PROCEDURE — 6360000002 HC RX W HCPCS: Performed by: INTERNAL MEDICINE

## 2019-04-29 PROCEDURE — 36415 COLL VENOUS BLD VENIPUNCTURE: CPT

## 2019-04-29 PROCEDURE — 84145 PROCALCITONIN (PCT): CPT

## 2019-04-29 PROCEDURE — 2500000003 HC RX 250 WO HCPCS: Performed by: STUDENT IN AN ORGANIZED HEALTH CARE EDUCATION/TRAINING PROGRAM

## 2019-04-29 PROCEDURE — 2060000000 HC ICU INTERMEDIATE R&B

## 2019-04-29 PROCEDURE — 85651 RBC SED RATE NONAUTOMATED: CPT

## 2019-04-29 PROCEDURE — 6370000000 HC RX 637 (ALT 250 FOR IP): Performed by: INTERNAL MEDICINE

## 2019-04-29 PROCEDURE — 80053 COMPREHEN METABOLIC PANEL: CPT

## 2019-04-29 PROCEDURE — 6370000000 HC RX 637 (ALT 250 FOR IP): Performed by: STUDENT IN AN ORGANIZED HEALTH CARE EDUCATION/TRAINING PROGRAM

## 2019-04-29 PROCEDURE — 85610 PROTHROMBIN TIME: CPT

## 2019-04-29 PROCEDURE — 71046 X-RAY EXAM CHEST 2 VIEWS: CPT

## 2019-04-29 PROCEDURE — 85027 COMPLETE CBC AUTOMATED: CPT

## 2019-04-29 PROCEDURE — C9113 INJ PANTOPRAZOLE SODIUM, VIA: HCPCS | Performed by: INTERNAL MEDICINE

## 2019-04-29 PROCEDURE — 6360000002 HC RX W HCPCS: Performed by: STUDENT IN AN ORGANIZED HEALTH CARE EDUCATION/TRAINING PROGRAM

## 2019-04-29 PROCEDURE — 82947 ASSAY GLUCOSE BLOOD QUANT: CPT

## 2019-04-29 PROCEDURE — 84100 ASSAY OF PHOSPHORUS: CPT

## 2019-04-29 PROCEDURE — 99232 SBSQ HOSP IP/OBS MODERATE 35: CPT | Performed by: INTERNAL MEDICINE

## 2019-04-29 PROCEDURE — 94640 AIRWAY INHALATION TREATMENT: CPT

## 2019-04-29 PROCEDURE — 2580000003 HC RX 258: Performed by: INTERNAL MEDICINE

## 2019-04-29 RX ORDER — BUDESONIDE AND FORMOTEROL FUMARATE DIHYDRATE 160; 4.5 UG/1; UG/1
2 AEROSOL RESPIRATORY (INHALATION) 2 TIMES DAILY
Status: DISCONTINUED | OUTPATIENT
Start: 2019-04-29 | End: 2019-04-29

## 2019-04-29 RX ORDER — PANTOPRAZOLE SODIUM 40 MG/1
40 TABLET, DELAYED RELEASE ORAL
Status: DISCONTINUED | OUTPATIENT
Start: 2019-04-30 | End: 2019-05-11 | Stop reason: ALTCHOICE

## 2019-04-29 RX ADMIN — PANTOPRAZOLE SODIUM 40 MG: 40 INJECTION, POWDER, FOR SOLUTION INTRAVENOUS at 09:03

## 2019-04-29 RX ADMIN — ASPIRIN 81 MG: 81 TABLET, COATED ORAL at 09:03

## 2019-04-29 RX ADMIN — IPRATROPIUM BROMIDE 0.5 MG: 0.5 SOLUTION RESPIRATORY (INHALATION) at 07:03

## 2019-04-29 RX ADMIN — LEVALBUTEROL 1.25 MG: 1.25 SOLUTION, CONCENTRATE RESPIRATORY (INHALATION) at 14:38

## 2019-04-29 RX ADMIN — IPRATROPIUM BROMIDE 0.5 MG: 0.5 SOLUTION RESPIRATORY (INHALATION) at 10:42

## 2019-04-29 RX ADMIN — LEVALBUTEROL 1.25 MG: 1.25 SOLUTION, CONCENTRATE RESPIRATORY (INHALATION) at 20:52

## 2019-04-29 RX ADMIN — CLOPIDOGREL BISULFATE 75 MG: 75 TABLET ORAL at 09:03

## 2019-04-29 RX ADMIN — IPRATROPIUM BROMIDE 0.5 MG: 0.5 SOLUTION RESPIRATORY (INHALATION) at 20:52

## 2019-04-29 RX ADMIN — METOCLOPRAMIDE 10 MG: 5 INJECTION, SOLUTION INTRAMUSCULAR; INTRAVENOUS at 20:28

## 2019-04-29 RX ADMIN — METOCLOPRAMIDE 10 MG: 5 INJECTION, SOLUTION INTRAMUSCULAR; INTRAVENOUS at 13:59

## 2019-04-29 RX ADMIN — METOPROLOL TARTRATE 12.5 MG: 25 TABLET ORAL at 09:03

## 2019-04-29 RX ADMIN — CALCIUM GLUCONATE: 94 INJECTION, SOLUTION INTRAVENOUS at 18:17

## 2019-04-29 RX ADMIN — VENLAFAXINE 37.5 MG: 37.5 TABLET ORAL at 09:06

## 2019-04-29 RX ADMIN — IPRATROPIUM BROMIDE 0.5 MG: 0.5 SOLUTION RESPIRATORY (INHALATION) at 14:39

## 2019-04-29 RX ADMIN — I.V. FAT EMULSION 100 ML: 20 EMULSION INTRAVENOUS at 18:18

## 2019-04-29 RX ADMIN — METOCLOPRAMIDE 10 MG: 5 INJECTION, SOLUTION INTRAMUSCULAR; INTRAVENOUS at 00:56

## 2019-04-29 RX ADMIN — LEVALBUTEROL 1.25 MG: 1.25 SOLUTION, CONCENTRATE RESPIRATORY (INHALATION) at 10:43

## 2019-04-29 RX ADMIN — LEVALBUTEROL 1.25 MG: 1.25 SOLUTION, CONCENTRATE RESPIRATORY (INHALATION) at 07:03

## 2019-04-29 RX ADMIN — VENLAFAXINE 37.5 MG: 37.5 TABLET ORAL at 20:28

## 2019-04-29 RX ADMIN — FLUCONAZOLE 200 MG: 100 TABLET ORAL at 09:03

## 2019-04-29 RX ADMIN — Medication 10 ML: at 09:04

## 2019-04-29 RX ADMIN — OXYCODONE AND ACETAMINOPHEN 1 TABLET: 5; 325 TABLET ORAL at 20:28

## 2019-04-29 RX ADMIN — PREDNISONE 20 MG: 20 TABLET ORAL at 09:03

## 2019-04-29 RX ADMIN — Medication 2 PUFF: at 20:29

## 2019-04-29 RX ADMIN — VENLAFAXINE 37.5 MG: 37.5 TABLET ORAL at 13:59

## 2019-04-29 RX ADMIN — METOCLOPRAMIDE 10 MG: 5 INJECTION, SOLUTION INTRAMUSCULAR; INTRAVENOUS at 06:45

## 2019-04-29 RX ADMIN — OXYCODONE AND ACETAMINOPHEN 1 TABLET: 5; 325 TABLET ORAL at 11:09

## 2019-04-29 RX ADMIN — METOPROLOL TARTRATE 12.5 MG: 25 TABLET ORAL at 22:33

## 2019-04-29 ASSESSMENT — PAIN SCALES - GENERAL
PAINLEVEL_OUTOF10: 10
PAINLEVEL_OUTOF10: 0
PAINLEVEL_OUTOF10: 5
PAINLEVEL_OUTOF10: 6

## 2019-04-29 ASSESSMENT — ENCOUNTER SYMPTOMS
SHORTNESS OF BREATH: 0
EYE REDNESS: 0
CONSTIPATION: 0
SORE THROAT: 0
VOMITING: 0
COUGH: 0
DIARRHEA: 0
NAUSEA: 0

## 2019-04-29 NOTE — PROGRESS NOTES
[address] [facility].    PROGRESS NOTE             4/29/2019    8:57 AM    Name:   Chloe Alexander  MRN:     [id number]     Acct:      [de-identified]   Room:   [facility] Day:  18  Admit Date:  4/11/2019  2:48 PM    PCP:  Macy Laguna DO  Code Status:  Full Code    Subjective: Interval History Status: improved. Patient seen and examined at bedside. No complaints overnight. Tolerating diet appropriately. Brief History:     Per EMR:     The patient is a 77 y.o.  Female who presents withAbdominal Pain   and she is admitted to the hospital for the management of abdominal distension, nausea, vomiting, and acute cystitis.      Patient presents with chills, nausea, vomiting, abdominal pain (diffuse, but worse in the suprapubic region), abdominal distension, and dysuria of 2-3 days duration. Denies hematemesis. Acknowledges difficulty keeping food down but tolerating fluids. States abdominal pain is a 6/10 on average, and denies trying any medication to alleviate her sx.      Denies recent antibiotic use or steroid use.      ED: Tachycardic 100-114 BP, WBC 47.9 w/neutrophils 88,  UCx from 4/2 + E. Coli > 100,000 w/suceptibility to cipro. Review of Systems:     Review of Systems   Constitutional: Negative for chills and fever. HENT: Negative for sore throat. Eyes: Negative for redness. Respiratory: Negative for cough and shortness of breath. Cardiovascular: Negative for chest pain and leg swelling. Gastrointestinal: Negative for constipation, diarrhea, nausea and vomiting. Genitourinary: Negative for dysuria and hematuria. Musculoskeletal: Negative for arthralgias. Skin: Negative for rash. Neurological: Negative for headaches. Hematological: Negative for adenopathy. Medications: Allergies:     Allergies   Allergen Reactions    Penicillins Shortness Of Breath and Rash    Amoxicillin 115/61   Pulse 85   Temp 98.6 °F (37 °C) (Oral)   Resp 20   Ht 5' (1.524 m)   Wt 132 lb 7.9 oz (60.1 kg)   SpO2 95%   BMI 25.88 kg/m²   Temp (24hrs), Av °F (36.7 °C), Min:97.5 °F (36.4 °C), Max:98.6 °F (37 °C)    Recent Labs     19  0701 19  1106 19  1537 19  2154   POCGLU 112* 156* 111* 118*       I/O(24Hr): Intake/Output Summary (Last 24 hours) at 2019 0857  Last data filed at 2019 0289  Gross per 24 hour   Intake 1362.68 ml   Output 2400 ml   Net -1037.32 ml       Labs:    [unfilled]    Lab Results   Component Value Date/Time    SPECIAL NOT REPORTED 2019 10:10 PM     Lab Results   Component Value Date/Time    CULTURE (A) 2019 04:59 PM     YEAST, NOT CANDIDA ALBICANS OR CANDIDA DUBLINIENSIS SCANT GROWTH    CULTURE (A) 2019 04:59 PM     KLEBSIELLA PNEUMONIAE ONE COLONY FORMING UNIT THIS ORGANISM IS AN EXTENDED-SPECTRUM BETA-LACTAMASE  AND RESISTANCE TO THERAPY WITH PENICILLINS, CEPHALOSPORINS AND AZTREONAM IS EXPECTED. THESE ORGANISMS GENERALLY REMAIN SUSCEPTIBLE TO CARBAPENEMS. CONSIDER ID CONSULTATION. CULTURE NO ANAEROBIC ORGANISMS ISOLATED AT 5 DAYS (A) 2019 04:59 PM       [unfilled]    Radiology:    Jessica Saxena Femur Left (min 2 Views)    Result Date: 3/27/2019  EXAMINATION: 4 XRAY VIEWS OF THE LEFT FEMUR; 2 XRAY VIEWS OF THE LEFT KNEE; 3 XRAY VIEWS OF THE LEFT TIBIA AND FIBULA 3/27/2019 7:00 pm COMPARISON: Left hip 2015. HISTORY: ORDERING SYSTEM PROVIDED HISTORY: pain TECHNOLOGIST PROVIDED HISTORY: pain Ordering Physician Provided Reason for Exam: pt twisted wrong, lt knee pain, unable to straighten knee. Acuity: Acute Type of Exam: Initial FINDINGS: Left femur, four views: The bones are diffusely osteopenic. No acute fracture deformity. The hip joint is maintained. The femoral head projects within the acetabulum. No focal soft tissue abnormality is seen. Left knee, two views: The knee is flexed.   No acute osseous abnormality. No joint effusion. Joint spaces are not optimally profiled but no significant arthritic changes are apparent. Left tib-fib, three views: There is evidence of a remote healed distal tibia fracture. No acute fracture or dislocation is seen. No focal soft tissue abnormality. No acute osseous abnormality of the left femur. No acute osseous abnormality of the left knee. No acute osseous abnormality of the left tib-fib. Healed remote distal tibia fracture. Marked osteopenia, likely due to disuse. Xr Knee Left (1-2 Views)    Result Date: 3/27/2019  EXAMINATION: 4 XRAY VIEWS OF THE LEFT FEMUR; 2 XRAY VIEWS OF THE LEFT KNEE; 3 XRAY VIEWS OF THE LEFT TIBIA AND FIBULA 3/27/2019 7:00 pm COMPARISON: Left hip 11/14/2015. HISTORY: ORDERING SYSTEM PROVIDED HISTORY: pain TECHNOLOGIST PROVIDED HISTORY: pain Ordering Physician Provided Reason for Exam: pt twisted wrong, lt knee pain, unable to straighten knee. Acuity: Acute Type of Exam: Initial FINDINGS: Left femur, four views: The bones are diffusely osteopenic. No acute fracture deformity. The hip joint is maintained. The femoral head projects within the acetabulum. No focal soft tissue abnormality is seen. Left knee, two views: The knee is flexed. No acute osseous abnormality. No joint effusion. Joint spaces are not optimally profiled but no significant arthritic changes are apparent. Left tib-fib, three views: There is evidence of a remote healed distal tibia fracture. No acute fracture or dislocation is seen. No focal soft tissue abnormality. No acute osseous abnormality of the left femur. No acute osseous abnormality of the left knee. No acute osseous abnormality of the left tib-fib. Healed remote distal tibia fracture. Marked osteopenia, likely due to disuse.      Xr Knee Left (3 Views)    Result Date: 3/30/2019  EXAMINATION: 3 XRAY VIEWS OF THE LEFT KNEE 3/30/2019 10:58 am COMPARISON: March 27, 2018 HISTORY: ORDERING SYSTEM PROVIDED HISTORY: F/u L knee pain after cast TECHNOLOGIST PROVIDED HISTORY: AP, oblique, lateral F/u L knee pain after cast FINDINGS: Marked osteopenia. Interval casting, which degrades fine osseous detail question cortical offset in the lateral femoral metaphysis. No malalignment identified. No significant joint effusion. Interval casting. Question nondisplaced fracture in the lateral femoral metaphysis. Osteopenia. Xr Tibia Fibula Left (2 Views)    Result Date: 3/27/2019  EXAMINATION: 4 XRAY VIEWS OF THE LEFT FEMUR; 2 XRAY VIEWS OF THE LEFT KNEE; 3 XRAY VIEWS OF THE LEFT TIBIA AND FIBULA 3/27/2019 7:00 pm COMPARISON: Left hip 11/14/2015. HISTORY: ORDERING SYSTEM PROVIDED HISTORY: pain TECHNOLOGIST PROVIDED HISTORY: pain Ordering Physician Provided Reason for Exam: pt twisted wrong, lt knee pain, unable to straighten knee. Acuity: Acute Type of Exam: Initial FINDINGS: Left femur, four views: The bones are diffusely osteopenic. No acute fracture deformity. The hip joint is maintained. The femoral head projects within the acetabulum. No focal soft tissue abnormality is seen. Left knee, two views: The knee is flexed. No acute osseous abnormality. No joint effusion. Joint spaces are not optimally profiled but no significant arthritic changes are apparent. Left tib-fib, three views: There is evidence of a remote healed distal tibia fracture. No acute fracture or dislocation is seen. No focal soft tissue abnormality. No acute osseous abnormality of the left femur. No acute osseous abnormality of the left knee. No acute osseous abnormality of the left tib-fib. Healed remote distal tibia fracture. Marked osteopenia, likely due to disuse.      Ct Abdomen Pelvis W Iv Contrast    Result Date: 4/11/2019  EXAMINATION: CT OF THE ABDOMEN AND PELVIS WITH CONTRAST 4/11/2019 5:14 pm TECHNIQUE: CT of the abdomen and pelvis was performed with the administration of intravenous contrast. Multiplanar reformatted images are provided for review. Dose modulation, iterative reconstruction, and/or weight based adjustment of the mA/kV was utilized to reduce the radiation dose to as low as reasonably achievable. COMPARISON: None. HISTORY: ORDERING SYSTEM PROVIDED HISTORY: Abdominal pain TECHNOLOGIST PROVIDED HISTORY: IV Only Contrast Ordering Physician Provided Reason for Exam: patient c/o abd pain for an hour FINDINGS: Lower Chest: Trace pleural fluid bilaterally is noted. Indwelling cardiac pacemaker is present. Heart size is normal.  Coronary artery calcifications are evident. The esophagus is significantly dilated and fluid-filled. No paraesophageal adenopathy is evident. Organs: The spleen, pancreas, and adrenals are unremarkable. The liver contains hypodensities, likely cysts. The gallbladder is distended and contains a solitary gallstone. The kidneys excrete contrast bilaterally. Extrarenal collecting systems are noted. The ureters are mildly dilated down to the urinary bladder without evidence of intraluminal or ureteral obstructing calculi. GI/Bowel: Marked distention of the stomach is noted; this continues to the pylorus with appearance suggesting partial gastric outlet obstruction. Fluid is present in some small bowel loops and colon distal to the stomach. No small bowel obstruction. Pelvis: Marked distention of the urinary bladder is noted. 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 Distended ureters may be related to reflux or infection. No free pelvic fluid, pelvic or inguinal adenopathy is noted. Peritoneum/Retroperitoneum: No aortic aneurysm. Mild to moderate plaque is noted in the infrarenal abdominal aorta and both proximal iliac arteries. Shotty lymph nodes are present around the aorta in the upper abdomen. No mesenteric adenopathy is noted. Bones/Soft Tissues: Degenerative changes are present in the hips and lower lumbar facets.  Estimated biologic radiation dose consult, appreciate recs   Gen Surg, right IJ central line placed on 4/12 --> recommend removal   Cholecystotomy tube 4/22   ID consult, appreciate recs   Lasix HELD --> likely to restart tomorrow   F/U CXR tomorrow    Sinus Tachycardia, improved   HR 80s - 90s   Hx of CVA, and bradycardia w/pacemaker   Lopressor 12.5 BID and monitor    Acute Cholecystitis   GS consulted - appreciate recommendations   Surgical intervention - cholecystomy tube placed 4/22/19   Cholecystectomy inpatient? Gastroparesis   EGD: esophagitis, hiatal hernia, solid food in 75% stomach    Pepcid switched to Protonix, per GI recs   Reglan started on 4/17 --> Daily ECG   Considering systemic autonomic dysfunction as overlying diagnosis (gastroparesis, neurogenic bladder,  hypotension/fluctuating BPs)    TPN   CLD --> GS to advance    Anemia, likely 2/2 bleeding   Transfusion 1 U RBC on 4/14   Transfusion 2 U RBC on 4/16    Restarted Lovenox, INR wNL    Thrombocytopenia   Plts 84, max 275 during this admission   Continue lovenox   Will monitor plts    Hypokalemia   Potassium sliding scale    Hypophosphatemia   Sodium phosphate to correct    Left knee remote distal tibia fx w/osteopenia   Ortho replaced brace    Maintenance   Lovenox   Dulera, Xopenex, Symbicort, Atrovent   Continue home meds trazodone, ASA, Plavix, Norvasc, Effexor, Spiriva     Dispo   PT/OT Eval and treat --> please ambulate   Social work for [address] to progressive    Cristobal Patel MD  4/29/2019  8:57 AM   Attending Physician Statement    I have discussed the case of Elo Worley, including pertinent history and exam findings with the resident. I have seen and examined the patient and the key elements of the encounter have been performed by me. I agree with the assessment, plan, and orders as documented by the resident.   The patient's clinical condition is slowly improving,  Electronically signed by Ferdinand Mart MD on 4/29/2019 at 3:39 PM

## 2019-04-29 NOTE — PROGRESS NOTES
Tran removed per order. Pt tolerated procedure well. Pt instructed to call out when needing to void; will continue to monitor for adequate urine output.

## 2019-04-29 NOTE — PROGRESS NOTES
Nutrition Assessment (Parenteral Nutrition)    Type and Reason for Visit: Reassess    Nutrition Recommendations: Following changes made in additives: NaCl- 140 to 160 mEq, K acetate- 15 to 0 mEq, K Phos- 34 to 28 mmol, KCl- 20 to 0 mEq, add MVI & trace elements. Nutrition Assessment: Pt stable from a nutritional standpoint as being supported with TPN & lipids. Diet was advanced to low fat at lunch with pt only taking 2 bites, she indicates not hungry. Discontinued Ensure Clear at pt's request, she is willing to try Ensure High Protein. Malnutrition Assessment:  · Malnutrition Status: At risk for malnutrition  · Context: Acute illness or injury  · Findings of the 6 clinical characteristics of malnutrition (Minimum of 2 out of 6 clinical characteristics is required to make the diagnosis of moderate or severe Protein Calorie Malnutrition based on AND/ASPEN Guidelines):  1. Energy Intake-Less than or equal to 75% of estimated energy requirement(Previously; improving with parenteral nutrition), Greater than or equal to 5 days    2. Weight Loss-Unable to assess(edema present),    3. Fat Loss-Unable to assess,    4. Muscle Loss-Unable to assess,    5. Fluid Accumulation-(moderate fluid accumulation), Extremities  6.  Strength-Not measured    Nutrition Risk Level: High    Nutrient Needs:  · Estimated Daily Total Kcal: 9963-7298 based on wt of 25-27 per kg using wt of 57.2 kg  · Estimated Daily Protein (g): 63-68 based on 1.4-1.5 gm/kg IBW(revised)    Nutrition Diagnosis:   · Problem: Inadequate oral intake  · Etiology: related to Alteration in GI function, Insufficient energy/nutrient consumption     Signs and symptoms:  as evidenced by Nutrition support - PN    Objective Information:  · Nutrition-Focused Physical Findings: Edema: +2 pitting LUE, +3 pitting LLE, +1 RUE, RLE.  GI: bowel sounds hypoactive, passing flatus  · Wound Type: Stage I, Pressure Ulcer(Stage pressure ulcer on buttocks; thigh wound)  · Current Nutrition Therapies:  · Oral Diet Orders: Low Fat   · Oral Diet intake: 1-25%  · Oral Nutrition Supplement (ONS) Orders: Clear Liquid Oral Supplement  · ONS intake: Refused  · Parenteral Nutrition Orders:  · Type and Formula: Premix Central, 2-in-1 Custom   · Lipids: 100ml, Daily  · Rate/Volume: 65 ml/ 1560 ml daily  · Duration: Continuous  · Current PN Order Provides: 1573 kcals, 78 gm of protein (lipids included)  · Goal PN Orders Provides: 5472-4906 kcal; 63-68 gm pro  · Additional Calories: None  · Anthropometric Measures:  · Ht: 5' (152.4 cm)   · Current Body Wt: 132 lb 7.9 oz (60.1 kg)  · Admission Body Wt: 102 lb (46.3 kg)  · Ideal Body Wt: 100 lb (45.4 kg), % Ideal Body 102% based on admission wt  · BMI Classification: BMI 25.0 - 29.9 Overweight    Nutrition Interventions:   Continue current diet, Modify current ONS  Continued Inpatient Monitoring, Education not appropriate at this time    Nutrition Evaluation:   · Evaluation: Progressing toward goals   · Goals: PN to meet greater 90% of estimated nutrition needs   · Monitoring: Nutrition Progression, Meal Intake, Supplement Intake, Diet Tolerance, PN Intake, PN Tolerance, Wound Healing, I&O, Weight, Pertinent Labs, Diarrhea, Nausea or Vomiting, Monitor Bowel Function      Anju Lozano R.D., LJACQUELINE.   Clinical Dietitian  Office: [phone] No

## 2019-04-29 NOTE — FLOWSHEET NOTE
04/29/19 1217   Encounter Summary   Services provided to: Patient   Referral/Consult From: Ольга   Continue Visiting   (4-29-19)   Volunteer Visit Yes   Complexity of Encounter Low   Length of Encounter 15 minutes   Routine   Type Follow up   Spiritual/Spiritism   Type Spiritual support   Intervention [address] Patient received communion

## 2019-04-29 NOTE — PROGRESS NOTES
Writer notified Dr. Galvez Crews via perfect serve regarding central line and Dr. Kelley Boss recommendations.

## 2019-04-29 NOTE — PROGRESS NOTES
Infectious disease Consult Note      Patient: Sukhjinder Reilly  : 1950  Acct#:  [id number]     Date:  2019    Assessment:     Cholecystitis status post cholecystectomy tube  grew Candida non-albicans and one colony of ESBL Klebsiella on culture . Clinically improving     Ecoli Emphysematous cystitis treated     Aspiration pneumonia of right lower lobe treated    Sepsis /Septic shock     Yeast growth on sputum culture suspect contamination     Leukocytosis resolved    Acute hypoxic resp failure resolved    MRSA carrier    Urinary retention     Anemia      PCN allergy                 Recommendations:     PO Diflucan until 5 /3  Remove central line if  TPN weaned off  Follow CBC , renal function closely . Continue supportive care . Subjective:       History of Present Illness  Patient is a 77 y.o.  female admitted with Sepsis due to urinary tract infection   who is seen in consult for the same . She presented w dysuria and lower abd pain for around a week associated with vomiting ,was found hypotensive with leukocytosis . CT suggested emphysematous cystitis,possible gastric outlet obstruction. CXR showed a right lower infiltrate. High CA-19-9,CEA  Allergy to [facility] w SOB   Urine culture  grew ESCHERICHIA COLI resistant to Ampicillin ,sensitive to all other tested ABXS . Blood cultures negative . Mycoplasma IGM 0.97   YEAST MODERATE GROWTH on sputum culture   S/P EGD   with reported esophagitis. GB US Findings suggesting acute cholecystitis. HIIDA showed absent gallbladder filling. She was extubated on   Status post cholecystectomy tube  by interventional radiology.     Interval history :  She denied abdominal pain, no fever,no new complaints   She is tolerating diet , still on TPN  No fever    WBC normal.    Past Medical History:   Diagnosis Date    Abnormal computed tomography of cervical spine     sclerotic bone appearance     CVA (cerebral vascular accident) ([address]     left  side weakness    GERD (gastroesophageal reflux disease)     Hypertension     Paraproteinemia     Weight loss       Past Surgical History:   Procedure Laterality Date    INTUBATION  4/14/2019         PACEMAKER PLACEMENT  07/2011    Pacemaker is Medtronic Revo (compatible). Leads placed in 1995 are NOT MRI compatible. Placed at [address]. V's per Dr. Amanda Cartwright can not have an MRI.  UPPER GASTROINTESTINAL ENDOSCOPY N/A 4/16/2019    EGD ESOPHAGOGASTRODUODENOSCOPY @ BEDSIDE  ICU 2002 performed by Armando Mann MD at [address]          Admission Meds  No current facility-administered medications on file prior to encounter. Current Outpatient Medications on File Prior to Encounter   Medication Sig Dispense Refill    oxyCODONE-acetaminophen (PERCOCET) 5-325 MG per tablet Take 1 tablet by mouth every 6 hours as needed for Pain.  senna-docusate (PERICOLACE) 8.6-50 MG per tablet Take 1 tablet by mouth 2 times daily      budesonide-formoterol (SYMBICORT) 160-4.5 MCG/ACT AERO Inhale 2 puffs into the lungs 2 times daily      sucralfate (CARAFATE) 1 GM/10ML suspension Take 1 g by mouth 4 times daily       traZODone (DESYREL) 50 MG tablet Take 50 mg by mouth nightly      tiZANidine (ZANAFLEX) 2 MG tablet Take 2 mg by mouth every 8 hours as needed (left knee)       aspirin 81 MG tablet Take 81 mg by mouth daily      clopidogrel (PLAVIX) 75 MG tablet TAKE 1 TABLET DAILY 30 tablet 2    DOCQLACE 100 MG capsule TAKE 1 CAPSULE BY MOUTH IN THE MORNING & IN THE EVENING -DRINK PLENTYOF WATER WHILE TAKING THIS MEDICINE 30 capsule 1    amLODIPine (NORVASC) 10 MG tablet Take 10 mg by mouth daily.  LORazepam (ATIVAN) 0.5 MG tablet Take 0.5 mg by mouth every 6 hours as needed for Anxiety.  therapeutic multivitamin-minerals (THERAGRAN-M) tablet Take 1 tablet by mouth daily.  omeprazole (PRILOSEC) 20 MG capsule Take 20 mg by mouth daily.       venlafaxine (EFFEXOR) 37.5 MG tablet Take 37.5 mg by mouth 3 times daily.  Misc. Devices [facility]) [address] wheelchair with left fupper extremity support  Dx: stroke with left hemiparesis. 1 each 0           Allergies  Allergies   Allergen Reactions    Penicillins Shortness Of Breath and Rash    Amoxicillin         Social   Social History     Tobacco Use    Smoking status: Current Some Day Smoker     Packs/day: 1.00     Years: 30.00     Pack years: 30.00    Smokeless tobacco: Never Used   Substance Use Topics    Alcohol use: Not Currently     Alcohol/week: 16.8 oz     Types: 28 Glasses of wine per week                History reviewed. No pertinent family history. Review of Systems  Other than above 12 systems reviewed negative . Tolerating antibiotics. Physical Exam  /61   Pulse 85   Temp 98.6 °F (37 °C) (Oral)   Resp 18   Ht 5' (1.524 m)   Wt 132 lb 7.9 oz (60.1 kg)   SpO2 96%   BMI 25.88 kg/m²           General Appearance: alert ,NAD . Skin: warm and dry, no rash or erythema  Head: normocephalic and atraumatic  Eyes: pupils equal, round  Neck: neck supple and non tender   Pulmonary/Chest: coarse to auscultation bilaterally- no wheezes, rales or rhonchi  Cardiovascular: normal rate, regular rhythm, normal S1 and S2, no murmurs.   Abdomen: soft,mild upper abdomen tenderness  -distended,  no masses or organomegaly, gallbladder drain with  bile drainage   Extremities: no cyanosis, clubbing   Edema   Right IJ line       Data Review:    Recent Labs     04/27/19  0350 04/28/19  0507 04/29/19  0510   WBC 12.1* 9.2 9.4   HGB 9.9* 9.0* 8.9*   HCT 30.4* 27.0* 26.9*   MCV 89.9 88.9 89.5   PLT 97* 87* 84*     Recent Labs     04/27/19  0350 04/28/19  0507 04/29/19  0510   * 135 132*   K 4.4 4.5 4.9    104 101   CO2 22 23 22   PHOS  --  3.3 3.7   BUN 21 19 22   CREATININE <0.40* <0.40* <0.40*     Recent Labs     04/27/19  0350 04/28/19  0507 04/29/19  0510   AST 21 20 17   ALT 16 15 15   BILITOT 0.59 0.59 0.43 ALKPHOS 89 86 94     No results for input(s): LIPASE, AMYLASE in the last 72 hours. Recent Labs     04/27/19  0350 04/28/19  0507 04/29/19  0510   PROTIME 17.0* 17.3* 15.3*   INR 1.4 1.4 1.2       Imaging Studies:                           All appropriate imaging studies and reports reviewed: Yes       Ct Abdomen Pelvis Wo Contrast Additional Contrast? Oral    Result Date: 4/14/2019  EXAMINATION: CT OF THE ABDOMEN AND PELVIS WITHOUT CONTRAST 4/14/2019 7:34 pm TECHNIQUE: CT of the abdomen and pelvis was performed without the administration of intravenous contrast. Multiplanar reformatted images are provided for review. Dose modulation, iterative reconstruction, and/or weight based adjustment of the mA/kV was utilized to reduce the radiation dose to as low as reasonably achievable. COMPARISON: 04/11/2019 HISTORY: ORDERING SYSTEM PROVIDED HISTORY: ABDOMINAL PAIN TECHNOLOGIST PROVIDED HISTORY: Water soluble contrast only please Ordering Physician Provided Reason for Exam: Abdominal pain - Vented patient. Contrast given via nurse through NG tube. Acuity: Unknown Type of Exam: Unknown Relevant Medical/Surgical History: Hx - Sepsis due to urinary tract infection. FINDINGS: Lower Chest: New moderate layering bilateral pleural effusions with bilateral lower lobe atelectasis. Organs: Limited evaluation due lack of intravenous contrast.  Cholelithiasis redemonstrated. No gallbladder wall thickening or biliary ductal dilatation. Scattered tiny hypodense lesions in the liver are too small to characterize but statistically represent benign cysts or hemangiomas and appear unchanged. The pancreas, spleen, adrenal glands, and kidneys are unremarkable. There is no hydronephrosis or urinary tract calculus. GI/Bowel: The stomach is distended. Enteric tube is in place. No contrast is seen distal to the pylorus and there is contrast reflux into the distal esophagus. There is no evidence of bowel obstruction.   The appendix is not definitely visualized. No focal pericecal inflammatory changes are evident. Pelvis: The urinary bladder is decompressed by Tran catheter. No pelvic mass is seen. Peritoneum/Retroperitoneum: Small amount of free fluid in the pelvic cavity. No free air or focal fluid collection. No abnormal lymph node. Normal abdominal aortic caliber. Moderate calcific atherosclerosis. Bones/Soft Tissues: No acute osseous abnormality. Diffuse anasarca. Moderate degenerative changes in the lumbar spine. 1. Distended, contrast filled stomach with reflux of contrast into the esophagus. No enteric contrast is seen distal to the pylorus suggesting delayed gastric emptying in the setting of ileus. 2. New moderate layering bilateral pleural effusions with bilateral lower lobe atelectasis. 3. Small amount of nonspecific free fluid in the pelvic cavity. 4. Anasarca. 5. Cholelithiasis. Xr Chest (single View Frontal)    Result Date: 4/13/2019  EXAMINATION: SINGLE XRAY VIEW OF THE CHEST 4/13/2019 7:18 am COMPARISON: April 12, 2019 HISTORY: ORDERING SYSTEM PROVIDED HISTORY: dyspnea TECHNOLOGIST PROVIDED HISTORY: dyspnea Ordering Physician Provided Reason for Exam: dyspnea Acuity: Acute Type of Exam: Subsequent/Follow-up Additional signs and symptoms: dyspnea FINDINGS: A right IJ catheter is seen with its tip terminating at the superior cavoatrial junction. The left chest wall pacemaker and leads are stable. The cardiomediastinal silhouette is stable. There is interval increased opacity at the right lung base, may be related to atelectasis versus pneumonia. There is small atelectasis and mild pleural effusion at the left lung base. There is no pneumothorax. There is no acute osseous abnormality. Interval increased opacity at the right lung base, may be related to atelectasis versus pneumonia. Small atelectasis and mild pleural effusion at the left lung, new since the prior study.      Xr Femur Left (min 2 Views)    Result Date: 3/27/2019  EXAMINATION: 4 XRAY VIEWS OF THE LEFT FEMUR; 2 XRAY VIEWS OF THE LEFT KNEE; 3 XRAY VIEWS OF THE LEFT TIBIA AND FIBULA 3/27/2019 7:00 pm COMPARISON: Left hip 11/14/2015. HISTORY: ORDERING SYSTEM PROVIDED HISTORY: pain TECHNOLOGIST PROVIDED HISTORY: pain Ordering Physician Provided Reason for Exam: pt twisted wrong, lt knee pain, unable to straighten knee. Acuity: Acute Type of Exam: Initial FINDINGS: Left femur, four views: The bones are diffusely osteopenic. No acute fracture deformity. The hip joint is maintained. The femoral head projects within the acetabulum. No focal soft tissue abnormality is seen. Left knee, two views: The knee is flexed. No acute osseous abnormality. No joint effusion. Joint spaces are not optimally profiled but no significant arthritic changes are apparent. Left tib-fib, three views: There is evidence of a remote healed distal tibia fracture. No acute fracture or dislocation is seen. No focal soft tissue abnormality. No acute osseous abnormality of the left femur. No acute osseous abnormality of the left knee. No acute osseous abnormality of the left tib-fib. Healed remote distal tibia fracture. Marked osteopenia, likely due to disuse. Xr Knee Left (1-2 Views)    Result Date: 3/27/2019  EXAMINATION: 4 XRAY VIEWS OF THE LEFT FEMUR; 2 XRAY VIEWS OF THE LEFT KNEE; 3 XRAY VIEWS OF THE LEFT TIBIA AND FIBULA 3/27/2019 7:00 pm COMPARISON: Left hip 11/14/2015. HISTORY: ORDERING SYSTEM PROVIDED HISTORY: pain TECHNOLOGIST PROVIDED HISTORY: pain Ordering Physician Provided Reason for Exam: pt twisted wrong, lt knee pain, unable to straighten knee. Acuity: Acute Type of Exam: Initial FINDINGS: Left femur, four views: The bones are diffusely osteopenic. No acute fracture deformity. The hip joint is maintained. The femoral head projects within the acetabulum. No focal soft tissue abnormality is seen. Left knee, two views: The knee is flexed.   No acute osseous abnormality. No joint effusion. Joint spaces are not optimally profiled but no significant arthritic changes are apparent. Left tib-fib, three views: There is evidence of a remote healed distal tibia fracture. No acute fracture or dislocation is seen. No focal soft tissue abnormality. No acute osseous abnormality of the left femur. No acute osseous abnormality of the left knee. No acute osseous abnormality of the left tib-fib. Healed remote distal tibia fracture. Marked osteopenia, likely due to disuse. Xr Knee Left (3 Views)    Result Date: 3/30/2019  EXAMINATION: 3 XRAY VIEWS OF THE LEFT KNEE 3/30/2019 10:58 am COMPARISON: March 27, 2018 HISTORY: ORDERING SYSTEM PROVIDED HISTORY: F/u L knee pain after cast TECHNOLOGIST PROVIDED HISTORY: AP, oblique, lateral F/u L knee pain after cast FINDINGS: Marked osteopenia. Interval casting, which degrades fine osseous detail question cortical offset in the lateral femoral metaphysis. No malalignment identified. No significant joint effusion. Interval casting. Question nondisplaced fracture in the lateral femoral metaphysis. Osteopenia. Xr Tibia Fibula Left (2 Views)    Result Date: 3/27/2019  EXAMINATION: 4 XRAY VIEWS OF THE LEFT FEMUR; 2 XRAY VIEWS OF THE LEFT KNEE; 3 XRAY VIEWS OF THE LEFT TIBIA AND FIBULA 3/27/2019 7:00 pm COMPARISON: Left hip 11/14/2015. HISTORY: ORDERING SYSTEM PROVIDED HISTORY: pain TECHNOLOGIST PROVIDED HISTORY: pain Ordering Physician Provided Reason for Exam: pt twisted wrong, lt knee pain, unable to straighten knee. Acuity: Acute Type of Exam: Initial FINDINGS: Left femur, four views: The bones are diffusely osteopenic. No acute fracture deformity. The hip joint is maintained. The femoral head projects within the acetabulum. No focal soft tissue abnormality is seen. Left knee, two views: The knee is flexed. No acute osseous abnormality. No joint effusion.   Joint spaces are not optimally profiled but no significant arthritic changes are apparent. Left tib-fib, three views: There is evidence of a remote healed distal tibia fracture. No acute fracture or dislocation is seen. No focal soft tissue abnormality. No acute osseous abnormality of the left femur. No acute osseous abnormality of the left knee. No acute osseous abnormality of the left tib-fib. Healed remote distal tibia fracture. Marked osteopenia, likely due to disuse. Xr Abdomen (kub) (single Ap View)    Result Date: 4/14/2019  EXAMINATION: SINGLE SUPINE XRAY VIEW(S) OF THE ABDOMEN 4/14/2019 7:39 am COMPARISON: CT abdomen and pelvis  film from 11 April 2019 HISTORY: [address] [facility] [address]: Abd Distention TECHNOLOGIST PROVIDED HISTORY: Abd Distention Ordering Physician Provided Reason for Exam: Abdominal distention. Pt was moving around in the bed. Best films at present time. Acuity: Acute Type of Exam: Initial Additional signs and symptoms: Abdominal distention. Pt was moving around in the bed. Best films at present time. FINDINGS: Portable view time stamped at 748 hours demonstrates an intestinal tube terminating in the midportion of a gaseous Spike dilated stomach. Densities are present over the stomach likely medication. Bipolar pacemaker is in situ with intact leads. Heart size is top-normal, stable. Gaseous distension of the stomach and loop of bowel in the upper mid abdomen is noted but there is gas and fecal material in the rectum. Gastric outlet obstruction or proximal small bowel partial obstruction is suspected. No free air is noted. Midline city is present over the pelvis likely a monitor or CT small bore catheter. Vascular calcification is present in the pelvis. Persistent preferential gaseous distension of the stomach although there is some gas in the upper abdomen and gas and fecal material present in the rectosigmoid. Findings suggest gastric outlet obstruction.      Ct Abdomen Pelvis W Iv Contrast    Result Date: 4/11/2019  EXAMINATION: CT OF THE ABDOMEN AND PELVIS WITH CONTRAST 4/11/2019 5:14 pm TECHNIQUE: CT of the abdomen and pelvis was performed with the administration of intravenous contrast. Multiplanar reformatted images are provided for review. Dose modulation, iterative reconstruction, and/or weight based adjustment of the mA/kV was utilized to reduce the radiation dose to as low as reasonably achievable. COMPARISON: None. HISTORY: ORDERING SYSTEM PROVIDED HISTORY: Abdominal pain TECHNOLOGIST PROVIDED HISTORY: IV Only Contrast Ordering Physician Provided Reason for Exam: patient c/o abd pain for an hour FINDINGS: Lower Chest: Trace pleural fluid bilaterally is noted. Indwelling cardiac pacemaker is present. Heart size is normal.  Coronary artery calcifications are evident. The esophagus is significantly dilated and fluid-filled. No paraesophageal adenopathy is evident. Organs: The spleen, pancreas, and adrenals are unremarkable. The liver contains hypodensities, likely cysts. The gallbladder is distended and contains a solitary gallstone. The kidneys excrete contrast bilaterally. Extrarenal collecting systems are noted. The ureters are mildly dilated down to the urinary bladder without evidence of intraluminal or ureteral obstructing calculi. GI/Bowel: Marked distention of the stomach is noted; this continues to the pylorus with appearance suggesting partial gastric outlet obstruction. Fluid is present in some small bowel loops and colon distal to the stomach. No small bowel obstruction. Pelvis: Marked distention of the urinary bladder is noted. 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 Distended ureters may be related to reflux or infection. No free pelvic fluid, pelvic or inguinal adenopathy is noted. Peritoneum/Retroperitoneum: No aortic aneurysm. Mild to moderate plaque is noted in the infrarenal abdominal aorta and both proximal iliac arteries.  Shotty lymph nodes are present around the aorta in the upper abdomen. No mesenteric adenopathy is noted. Bones/Soft Tissues: Degenerative changes are present in the hips and lower lumbar facets. Estimated biologic radiation dose for this procedure:258.77 mGy/cm2.     1. Dilatation of the thoracic esophagus filled with fluid. No obstructive process is noted. No adjacent enlarged lymph nodes. 2. Trace pleural fluid. 3. Marked distention of the stomach. This appears to extend to the pylorus. Partial gastric outlet obstruction is not excluded. 4. Bilateral dilated ureters without obstructing calculi. Urinary bladder is markedly distended with bladder wall air suggesting emphysematous cystitis. Dilatation of the ureters may be related to reflux or infection. 5. Atherosclerotic disease. 6. Other findings as above. Critical results were called by Dr. Akua Trujillo MD to [address] on 4/11/2019 at 17:37. Xr Chest Portable    Result Date: 4/16/2019  EXAMINATION: SINGLE XRAY VIEW OF THE CHEST 4/16/2019 6:54 am COMPARISON: 04/15/2019, 610 hours HISTORY: ORDERING SYSTEM PROVIDED HISTORY: ETT placement TECHNOLOGIST PROVIDED HISTORY: ETT placement Ordering Physician Provided Reason for Exam: on vent Acuity: Acute Type of Exam: Initial 75-year-old female on ventilator; check endotracheal tube placement FINDINGS: Portable AP upright view of the chest. Endotracheal tube distal tip overlying the mid trachea approximately 4.1 cm above the level of the ron. Enteric tube traverses the GE junction with distal tip excluded from the field of view. Left subclavian approach cardiac pacemaker device distal lead tips relatively stable in position. Right internal jugular approach central venous catheter distal tip overlying the high right atrium, stable. Cardiac monitor leads overlie the chest. Atherosclerotic calcification of the thoracic aorta. Slight stable volume loss of the left hemithorax. No pneumothorax. No free air.   Dense retrocardiac/left basilar airspace consolidation and small left-sided pleural effusion. Stable mild focal opacity at the right mid lung zone. Underlying COPD. Stable mild pulmonary vascular congestion and left-sided predominant parahilar opacity. Visualized osseous structures remain unchanged. 1. Stable multifocal airspace disease as detailed above with dense retrocardiac/left basilar airspace consolidation and small left-sided pleural effusion. Mild pulmonary vascular congestion. Findings may represent edema or multifocal pneumonia. 2. Underlying COPD. 3. Tubes and line as detailed above. Xr Chest Portable    Result Date: 4/15/2019  EXAMINATION: SINGLE XRAY VIEW OF THE CHEST 4/15/2019 6:47 am COMPARISON: 14 April 2019 HISTORY: ORDERING SYSTEM PROVIDED HISTORY: ETT placement TECHNOLOGIST PROVIDED HISTORY: ETT placement Ordering Physician Provided Reason for Exam: on vent Acuity: Acute Type of Exam: Initial FINDINGS: AP portable view of the chest time stamped at 612 hours demonstrates overlying cardiac monitoring electrodes. Endotracheal tube terminates 4 cm above the ron. Bipolar pacemaker enters from the left with intact leads in appropriate positions. Intestinal tube extends beyond the fundus of the stomach, tip not included. Right internal jugular catheter terminates at the cavoatrial junction. Heart size is normal.  Aortic arch is calcified. There is interval improvement in vascular congestion with resolution of perihilar opacities. Some bibasilar opacities remain. No extrapleural air is noted. Osseous structures are stable. Interval improvement in vascular congestion and bilateral opacities consistent with resolving pulmonary edema. Tubes and lines as above. Xr Chest Portable    Result Date: 4/14/2019  EXAMINATION: SINGLE XRAY VIEW OF THE CHEST 4/14/2019 7:57 am COMPARISON: Portable chest 04/13/2019.  HISTORY: ORDERING SYSTEM PROVIDED HISTORY: Intubation TECHNOLOGIST PROVIDED HISTORY: Intubation Ordering Physician Provided Reason for Exam: intubation Acuity: Acute Type of Exam: Initial Additional signs and symptoms: intubation FINDINGS: Endotracheal tube terminates over the midthoracic trachea. Dual-chamber pacemaker leads appear unchanged in position. Right IJ approach central venous catheter unchanged in position. Heart size not substantially changed. Perihilar and basilar opacities further increased. Left pleural effusion increased in size. Findings may reflect pulmonary edema, progressed from yesterday's exam.  Left pleural effusion increased in size. Xr Chest Portable    Result Date: 4/12/2019  EXAMINATION: SINGLE XRAY VIEW OF THE CHEST 4/12/2019 5:26 am COMPARISON: November 14, 2015. HISTORY: ORDERING SYSTEM PROVIDED HISTORY: line placement TECHNOLOGIST PROVIDED HISTORY: line placement Ordering Physician Provided Reason for Exam: New right side line placement. Acuity: Acute Type of Exam: Initial Additional signs and symptoms: New right side line placement. FINDINGS: Stable left pectoral trans venous cardiac pacer device. New right IJ central venous catheter with tip near the superior atrial caval junction. Normal lung volume. No new consolidation. Curvilinear radiopacity projecting over the right upper lobe likely represents artifact from a skin fold. No pleural effusion or pneumothorax. Stable cardiomediastinal silhouette and great vessels with redemonstration of atherosclerotic thoracic aorta. New right IJ central venous catheter with tip near the superior atrial caval junction. No pneumothorax. No new consolidation. Thank you for allowing me to participate in the care of your patient. Please feel free to contact me with any questions or concerns.      Mariana Rao MD

## 2019-04-29 NOTE — PROGRESS NOTES
Pulmonary Progress Note  [facility]      Patient - Leno Sullivan,  Age - 77 y.o.    - 1950      Room Number - 2123/2123-01   MRN -  [id number]   [facility]  Date of Admission -  2019  2:48 PM        Consulting Wanda Zapata MD  Primary Care Physician - Taylor Eubanks, DO     SUBJECTIVE   She denies any significant dyspnea but has not been mobilized    OBJECTIVE   VITALS    height is 5' (1.524 m) and weight is 132 lb 7.9 oz (60.1 kg). Her oral temperature is 98.6 °F (37 °C). Her blood pressure is 115/61 and her pulse is 85. Her respiration is 18 and oxygen saturation is 96%. Body mass index is 25.88 kg/m². Temperature Range: Temp: 98.6 °F (37 °C) Temp  Av.9 °F (36.6 °C)  Min: 97.5 °F (36.4 °C)  Max: 98.6 °F (37 °C)  BP Range:  Systolic (15CHN), FOA:556 , Min:115 , EIP:395     Diastolic (81YTJ), LNU:54, Min:61, Max:79    Pulse Range: Pulse  Av  Min: 85  Max: 93  Respiration Range: Resp  Av.3  Min: 16  Max: 20  Current Pulse Ox[de-identified]  SpO2: 96 %  24HR Pulse Ox Range:  SpO2  Av %  Min: 93 %  Max: 96 %  Oxygen Amount and Delivery: O2 Flow Rate (L/min): 2 L/min    Wt Readings from Last 3 Encounters:   19 132 lb 7.9 oz (60.1 kg)   19 89 lb (40.4 kg)   19 94 lb 1.6 oz (42.7 kg)       I/O (24 Hours)    Intake/Output Summary (Last 24 hours) at 2019 1447  Last data filed at 2019 0850  Gross per 24 hour   Intake 1517.68 ml   Output 1650 ml   Net -132.32 ml       EXAM     General Appearance  Awake, alert, oriented, in no acute distress thin and frail  HEENT - normocephalic, atraumatic.  []  Mallampati  [] Crowded airway   [] Macroglossia  []  Retrognathia  [] Micrognathia  []  Normal tongue size []  Normal Bite  [] Yves sign positive    Neck - Supple,  trachea midline   Lungs - managed with poor air movement  Cardiovascular - Heart sounds are normal.  Regular CANNOT BE CALCULATED 04/29/2019     ABGs:  Lab Results   Component Value Date    PHART 7.519 04/19/2019    PO2ART 73.3 04/19/2019    LYX5HHN 42.5 04/19/2019      Lab Results   Component Value Date    MODE PRVC 04/19/2019     Ionized Calcium:  No results found for: IONCA  Magnesium:    Lab Results   Component Value Date    MG 1.8 04/29/2019     Phosphorus:    Lab Results   Component Value Date    PHOS 3.7 04/29/2019        LIVER PROFILE   Recent Labs     04/29/19  0510   AST 17   ALT 15   BILITOT 0.43   ALKPHOS 94     INR   Recent Labs     04/29/19  0510   INR 1.2     PTT   Lab Results   Component Value Date    APTT 27.7 03/28/2012         RADIOLOGY     Chest x-ray from April 27 showed worsening effusions and upper lobe infiltrate    ASSESSMENT/PLAN   Principal Problem:    Sepsis due to urinary tract infection (HCC)  Active Problems:    Cystitis    Emphysematous cystitis    Septic shock (HCC)    Leukemoid reaction    Aspiration pneumonia of right lower lobe due to vomit (HCC)    MRSA carrier    Urinary retention    Abdominal distention    Elevated CEA    Elevated CA 19-9 level    Cholecystitis    CRP elevated    Elevated procalcitonin    Bandemia  Resolved Problems:    * No resolved hospital problems.  *    We'll check follow-up x-ray tomorrow  Add back her Symbicort which she is on as an outpatient  And use Xopenex and Atrovent  Central line has been in since April 12, it either needs to be changed to a PICC line or TPN needs to be discontinued  Electronically signed by Janie Horn MD on 4/29/2019 at 2:47 PM

## 2019-04-29 NOTE — PLAN OF CARE
Nutrition Problem: Inadequate oral intake  Intervention: Food and/or Nutrient Delivery: Continue current diet, Modify current ONS  Nutritional Goals: PN to meet greater 90% of estimated nutrition needs

## 2019-04-29 NOTE — CARE COORDINATION
Social Work-Met with pt regarding SNF. She does not want to return to [facility] [address]. Discussed preference of SNF. [facility] would like [facility]. Will need to know how long pt will need TPN.  Destiny Briones

## 2019-04-29 NOTE — CARE COORDINATION
DISCHARGE PLANNING NOTE:    The discharge plan is for patient to go to [facility]. Referral was sent and  from [facility] did an on site evaluation. Follow up xray planned tomorrow. PICC line planned for tomorrow. Will follow with LSW for discharge needs. MINESH NEEDS SIGNED AND COMPLETED.

## 2019-04-30 ENCOUNTER — APPOINTMENT (OUTPATIENT)
Dept: INTERVENTIONAL RADIOLOGY/VASCULAR | Age: 69
DRG: 853 | End: 2019-04-30
Payer: COMMERCIAL

## 2019-04-30 PROBLEM — J43.2 CENTRILOBULAR EMPHYSEMA (HCC): Chronic | Status: ACTIVE | Noted: 2019-04-30

## 2019-04-30 LAB
ALBUMIN SERPL-MCNC: 1.8 G/DL (ref 3.5–5.2)
ALBUMIN/GLOBULIN RATIO: ABNORMAL (ref 1–2.5)
ALP BLD-CCNC: 106 U/L (ref 35–104)
ALT SERPL-CCNC: 15 U/L (ref 5–33)
ANION GAP SERPL CALCULATED.3IONS-SCNC: 10 MMOL/L (ref 9–17)
AST SERPL-CCNC: 16 U/L
BILIRUB SERPL-MCNC: 0.34 MG/DL (ref 0.3–1.2)
BUN BLDV-MCNC: 21 MG/DL (ref 8–23)
BUN/CREAT BLD: ABNORMAL (ref 9–20)
C-REACTIVE PROTEIN: 42.3 MG/L (ref 0–5)
CALCIUM SERPL-MCNC: 7.5 MG/DL (ref 8.6–10.4)
CHLORIDE BLD-SCNC: 101 MMOL/L (ref 98–107)
CO2: 25 MMOL/L (ref 20–31)
CREAT SERPL-MCNC: <0.4 MG/DL (ref 0.5–0.9)
EKG ATRIAL RATE: 94 BPM
EKG P AXIS: 63 DEGREES
EKG P-R INTERVAL: 138 MS
EKG Q-T INTERVAL: 346 MS
EKG QRS DURATION: 66 MS
EKG QTC CALCULATION (BAZETT): 432 MS
EKG R AXIS: 13 DEGREES
EKG T AXIS: 64 DEGREES
EKG VENTRICULAR RATE: 94 BPM
GFR AFRICAN AMERICAN: ABNORMAL ML/MIN
GFR NON-AFRICAN AMERICAN: ABNORMAL ML/MIN
GFR SERPL CREATININE-BSD FRML MDRD: ABNORMAL ML/MIN/{1.73_M2}
GFR SERPL CREATININE-BSD FRML MDRD: ABNORMAL ML/MIN/{1.73_M2}
GLUCOSE BLD-MCNC: 103 MG/DL (ref 65–105)
GLUCOSE BLD-MCNC: 111 MG/DL (ref 65–105)
GLUCOSE BLD-MCNC: 113 MG/DL (ref 65–105)
GLUCOSE BLD-MCNC: 117 MG/DL (ref 70–99)
GLUCOSE BLD-MCNC: 125 MG/DL (ref 65–105)
GLUCOSE BLD-MCNC: 97 MG/DL (ref 65–105)
HCT VFR BLD CALC: 26.4 % (ref 36–46)
HEMOGLOBIN: 8.6 G/DL (ref 12–16)
INR BLD: 1.2
MCH RBC QN AUTO: 29.2 PG (ref 26–34)
MCHC RBC AUTO-ENTMCNC: 32.7 G/DL (ref 31–37)
MCV RBC AUTO: 89.3 FL (ref 80–100)
NRBC AUTOMATED: ABNORMAL PER 100 WBC
PDW BLD-RTO: 16 % (ref 11.5–14.9)
PLATELET # BLD: 91 K/UL (ref 150–450)
PMV BLD AUTO: 8.9 FL (ref 6–12)
POTASSIUM SERPL-SCNC: 4.6 MMOL/L (ref 3.7–5.3)
PREALBUMIN: 22.9 MG/DL (ref 20–40)
PROCALCITONIN: 0.12 NG/ML
PROTHROMBIN TIME: 15.3 SEC (ref 11.8–14.6)
RBC # BLD: 2.96 M/UL (ref 4–5.2)
SEDIMENTATION RATE, ERYTHROCYTE: 75 MM (ref 0–20)
SODIUM BLD-SCNC: 136 MMOL/L (ref 135–144)
TOTAL PROTEIN: 4.6 G/DL (ref 6.4–8.3)
WBC # BLD: 9.6 K/UL (ref 3.5–11)

## 2019-04-30 PROCEDURE — 85651 RBC SED RATE NONAUTOMATED: CPT

## 2019-04-30 PROCEDURE — 85027 COMPLETE CBC AUTOMATED: CPT

## 2019-04-30 PROCEDURE — 6370000000 HC RX 637 (ALT 250 FOR IP): Performed by: INTERNAL MEDICINE

## 2019-04-30 PROCEDURE — 6360000002 HC RX W HCPCS: Performed by: INTERNAL MEDICINE

## 2019-04-30 PROCEDURE — 02HV33Z INSERTION OF INFUSION DEVICE INTO SUPERIOR VENA CAVA, PERCUTANEOUS APPROACH: ICD-10-PCS | Performed by: INTERNAL MEDICINE

## 2019-04-30 PROCEDURE — 82947 ASSAY GLUCOSE BLOOD QUANT: CPT

## 2019-04-30 PROCEDURE — 80053 COMPREHEN METABOLIC PANEL: CPT

## 2019-04-30 PROCEDURE — 2580000003 HC RX 258: Performed by: INTERNAL MEDICINE

## 2019-04-30 PROCEDURE — 94761 N-INVAS EAR/PLS OXIMETRY MLT: CPT

## 2019-04-30 PROCEDURE — B5181ZA FLUOROSCOPY OF SUPERIOR VENA CAVA USING LOW OSMOLAR CONTRAST, GUIDANCE: ICD-10-PCS | Performed by: INTERNAL MEDICINE

## 2019-04-30 PROCEDURE — 97530 THERAPEUTIC ACTIVITIES: CPT

## 2019-04-30 PROCEDURE — 2500000003 HC RX 250 WO HCPCS: Performed by: SURGERY

## 2019-04-30 PROCEDURE — 6370000000 HC RX 637 (ALT 250 FOR IP): Performed by: STUDENT IN AN ORGANIZED HEALTH CARE EDUCATION/TRAINING PROGRAM

## 2019-04-30 PROCEDURE — 2700000000 HC OXYGEN THERAPY PER DAY

## 2019-04-30 PROCEDURE — 97110 THERAPEUTIC EXERCISES: CPT

## 2019-04-30 PROCEDURE — 6360000002 HC RX W HCPCS: Performed by: STUDENT IN AN ORGANIZED HEALTH CARE EDUCATION/TRAINING PROGRAM

## 2019-04-30 PROCEDURE — 2500000003 HC RX 250 WO HCPCS: Performed by: STUDENT IN AN ORGANIZED HEALTH CARE EDUCATION/TRAINING PROGRAM

## 2019-04-30 PROCEDURE — 84134 ASSAY OF PREALBUMIN: CPT

## 2019-04-30 PROCEDURE — 36415 COLL VENOUS BLD VENIPUNCTURE: CPT

## 2019-04-30 PROCEDURE — 84145 PROCALCITONIN (PCT): CPT

## 2019-04-30 PROCEDURE — 94760 N-INVAS EAR/PLS OXIMETRY 1: CPT

## 2019-04-30 PROCEDURE — 99232 SBSQ HOSP IP/OBS MODERATE 35: CPT | Performed by: INTERNAL MEDICINE

## 2019-04-30 PROCEDURE — 97535 SELF CARE MNGMENT TRAINING: CPT

## 2019-04-30 PROCEDURE — 93005 ELECTROCARDIOGRAM TRACING: CPT

## 2019-04-30 PROCEDURE — 2060000000 HC ICU INTERMEDIATE R&B

## 2019-04-30 PROCEDURE — 36573 INSJ PICC RS&I 5 YR+: CPT | Performed by: RADIOLOGY

## 2019-04-30 PROCEDURE — 99233 SBSQ HOSP IP/OBS HIGH 50: CPT | Performed by: INTERNAL MEDICINE

## 2019-04-30 PROCEDURE — 94640 AIRWAY INHALATION TREATMENT: CPT

## 2019-04-30 PROCEDURE — 2709999900 IR FLUORO GUIDED CVA DEVICE PLMT/REPLACE/REMOVAL

## 2019-04-30 PROCEDURE — 85610 PROTHROMBIN TIME: CPT

## 2019-04-30 PROCEDURE — 86140 C-REACTIVE PROTEIN: CPT

## 2019-04-30 RX ADMIN — IPRATROPIUM BROMIDE 0.5 MG: 0.5 SOLUTION RESPIRATORY (INHALATION) at 11:03

## 2019-04-30 RX ADMIN — ASPIRIN 81 MG: 81 TABLET, COATED ORAL at 08:19

## 2019-04-30 RX ADMIN — METOCLOPRAMIDE 10 MG: 5 INJECTION, SOLUTION INTRAMUSCULAR; INTRAVENOUS at 06:48

## 2019-04-30 RX ADMIN — METOCLOPRAMIDE 10 MG: 5 INJECTION, SOLUTION INTRAMUSCULAR; INTRAVENOUS at 01:45

## 2019-04-30 RX ADMIN — OXYCODONE AND ACETAMINOPHEN 1 TABLET: 5; 325 TABLET ORAL at 05:48

## 2019-04-30 RX ADMIN — LEVALBUTEROL 1.25 MG: 1.25 SOLUTION, CONCENTRATE RESPIRATORY (INHALATION) at 14:22

## 2019-04-30 RX ADMIN — IPRATROPIUM BROMIDE 0.5 MG: 0.5 SOLUTION RESPIRATORY (INHALATION) at 14:22

## 2019-04-30 RX ADMIN — VENLAFAXINE 37.5 MG: 37.5 TABLET ORAL at 14:02

## 2019-04-30 RX ADMIN — PANTOPRAZOLE SODIUM 40 MG: 40 TABLET, DELAYED RELEASE ORAL at 05:48

## 2019-04-30 RX ADMIN — VENLAFAXINE 37.5 MG: 37.5 TABLET ORAL at 08:19

## 2019-04-30 RX ADMIN — VENLAFAXINE 37.5 MG: 37.5 TABLET ORAL at 20:55

## 2019-04-30 RX ADMIN — OXYCODONE AND ACETAMINOPHEN 1 TABLET: 5; 325 TABLET ORAL at 20:55

## 2019-04-30 RX ADMIN — METOCLOPRAMIDE 10 MG: 5 INJECTION, SOLUTION INTRAMUSCULAR; INTRAVENOUS at 14:02

## 2019-04-30 RX ADMIN — CALCIUM GLUCONATE: 94 INJECTION, SOLUTION INTRAVENOUS at 17:42

## 2019-04-30 RX ADMIN — FLUCONAZOLE 200 MG: 100 TABLET ORAL at 08:19

## 2019-04-30 RX ADMIN — SODIUM CHLORIDE: 9 INJECTION, SOLUTION INTRAVENOUS at 00:29

## 2019-04-30 RX ADMIN — METOPROLOL TARTRATE 12.5 MG: 25 TABLET ORAL at 08:19

## 2019-04-30 RX ADMIN — PREDNISONE 20 MG: 20 TABLET ORAL at 08:19

## 2019-04-30 RX ADMIN — LEVALBUTEROL 1.25 MG: 1.25 SOLUTION, CONCENTRATE RESPIRATORY (INHALATION) at 18:52

## 2019-04-30 RX ADMIN — CLOPIDOGREL BISULFATE 75 MG: 75 TABLET ORAL at 08:19

## 2019-04-30 RX ADMIN — LEVALBUTEROL 1.25 MG: 1.25 SOLUTION, CONCENTRATE RESPIRATORY (INHALATION) at 11:03

## 2019-04-30 RX ADMIN — METOCLOPRAMIDE 10 MG: 5 INJECTION, SOLUTION INTRAMUSCULAR; INTRAVENOUS at 20:55

## 2019-04-30 RX ADMIN — I.V. FAT EMULSION 100 ML: 20 EMULSION INTRAVENOUS at 17:42

## 2019-04-30 RX ADMIN — IPRATROPIUM BROMIDE 0.5 MG: 0.5 SOLUTION RESPIRATORY (INHALATION) at 18:52

## 2019-04-30 RX ADMIN — METOPROLOL TARTRATE 12.5 MG: 25 TABLET ORAL at 20:55

## 2019-04-30 RX ADMIN — Medication 2 PUFF: at 20:56

## 2019-04-30 ASSESSMENT — PAIN SCALES - GENERAL
PAINLEVEL_OUTOF10: 8
PAINLEVEL_OUTOF10: 9
PAINLEVEL_OUTOF10: 8
PAINLEVEL_OUTOF10: 4
PAINLEVEL_OUTOF10: 5

## 2019-04-30 ASSESSMENT — PAIN DESCRIPTION - PAIN TYPE
TYPE: CHRONIC PAIN
TYPE: CHRONIC PAIN

## 2019-04-30 ASSESSMENT — ENCOUNTER SYMPTOMS
VOMITING: 0
SHORTNESS OF BREATH: 0
EYE REDNESS: 0
SORE THROAT: 0
NAUSEA: 0
CONSTIPATION: 0
DIARRHEA: 0
COUGH: 0

## 2019-04-30 ASSESSMENT — PAIN DESCRIPTION - LOCATION
LOCATION: GENERALIZED
LOCATION: BACK

## 2019-04-30 ASSESSMENT — PAIN DESCRIPTION - FREQUENCY: FREQUENCY: CONTINUOUS

## 2019-04-30 NOTE — FLOWSHEET NOTE
[address] [facility]    Physical Therapy Progress Note    Date: 19  Patient Name: Suad Valentino       Room: 3-01  MRN: [id number]   Account: [de-identified]   : 1950  (77 y.o.)   Gender: female     Discharge Recommendations   ECF with PT  Equipment Needed: No    Referring Practitioner: Fili Mcdonald MD  Diagnosis: Sepsis due to UTI  Restrictions/Precautions: Fall Risk  Implants present? : Pacemaker  Other position/activity restrictions: LLE in a long leg cast   Past Medical History:  has a past medical history of Abnormal computed tomography of cervical spine, CVA (cerebral vascular accident) ([address] GERD (gastroesophageal reflux disease), Hypertension, Paraproteinemia, and Weight loss. Past Surgical History:   has a past surgical history that includes pacemaker placement (2011); intubation (2019); and Upper gastrointestinal endoscopy (N/A, 2019). Additional Pertinent Hx: admitted 19 due to abdominal pain    Overall Orientation Status: Within Functional Limits  Restrictions/Precautions  Restrictions/Precautions: Fall Risk  Implants present? : Pacemaker  Position Activity Restriction  Other position/activity restrictions: LLE in a long leg cast    Subjective: Pt reports pain \"everywhere\" & describes getting nervous when staff arrives; pt pleads not to make her move EOB, pt verbalizes decision to stop AAROM on RLE after 3 reps  Comments: Co-tx w/ CALIX/L Valeria. Pt supine in bed w/ R lateral lean upon arrival. Pt agreeable to repositioning with pillows in order to take meds; SpO2 94% supine/HR 109bpm (reports anxiety); Vital Signs  Pulse: 109  Heart Rate Source: Monitor  Patient Currently in Pain: Yes  Pain Level: (did not rate)  Pain Type: Chronic pain  Pain Location: Generalized  Pain Frequency: Continuous  Non-Pharmaceutical Pain Intervention(s): Emotional support;Repositioned; Rest  Response to Pain Intervention: Patient Satisfied     Oxygen Therapy  SpO2: 94 %  Pulse Oximeter Device Mode: Intermittent  Pulse Oximeter Device Location: Finger  O2 Device: Nasal cannula  O2 Flow Rate (L/min): 2 L/min  Patient Observation  Observations: pt very guarded and reluctant to participate for fear of pain       Bed Mobility:   Bed Mobility  Comment: N/T: Pt chooses not to participate in bed mobs. Educated in importance of  repositioning to avoid pressure sores/ulcers & to maintain basic ROM. Transfers:                                Stairs/Curb  Stairs?: No                                                           Other exercises?: Yes  Other exercises 1: PROM RLE x3(after which pt refused to continue)  Other exercises 2: Education on importance of paticipating in ROM activities to maintain functional mobility & prevent shortening of muscles. Other exercises 3: Education on supine therex to maintain strength & help prevent DVTs in BLE            Activity Tolerance: Patient limited by pain;Treatment limited secondary to agitation(anxiety/fear of mobility causing pain)  Activity Tolerance: Pt refuses further PROM after x3 reps on RLE  PT Equipment Recommendations  Equipment Needed: No       Assessment  Activity Tolerance: Patient limited by pain;Treatment limited secondary to agitation(anxiety/fear of mobility causing pain)   Body structures, Functions, Activity limitations: Decreased functional mobility ; Decreased ADL status; Decreased strength; Increased Pain  Prognosis: Good  Discharge Recommendations: ECF with PT     Type of devices: Call light within reach; Left in bed  Restraints  Initially in place: No     Plan  Times per week: 5-7x/wk  Times per day: Daily  Current Treatment Recommendations: ROM, Strengthening, Functional Mobility Training, Transfer Training    Patient Education  New Education Provided: POC; repositioning to protect skin integrity, PROM/AAROM to prevent loss of functional mobility  Learner:patient  Method: demonstration and explanation       Outcome: needs reinforcement     Goals  Short term goals  Time Frame for Short term goals: 10 visits  Short term goal 1: improve strength RUE/RLE to 4/5 to assist in bed mobility and transfers  Short term goal 2: improve bed mobility to minx1  Short term goal 3: improve transfers to minx1  Short term goal 4: progress to Gait and/or use of wheelchair for mobility    PT Individual Minutes  Time In: 8834  Time Out: 1026  Minutes: 31    Electronically signed by LEANDRO Neville// on 4/30/19 at 1:51 PM    The above documentation and treatment completed by Ludmila Lamar Student Physical Therapist Assistant (SPTA)  was provided under the supervision in the direct line of sight and documention by student was reviewed and accepted by supervising Clinical Instructor Loreto Farr PTA.

## 2019-04-30 NOTE — PROGRESS NOTES
Infectious disease Consult Note      Patient: Mariela Worley  : 1950  Acct#:  [id number]     Date:  2019    Assessment:     Cholecystitis status post cholecystectomy tube  grew Candida non-albicans and one colony of ESBL Klebsiella on culture . Clinically improving     Ecoli Emphysematous cystitis treated     Aspiration pneumonia of right lower lobe treated    Sepsis /Septic shock     Yeast growth on sputum culture suspect contamination     Leukocytosis resolved    Acute hypoxic resp failure resolved    MRSA carrier    Urinary retention     Anemia      PCN allergy                 Recommendations:     PO Diflucan until 5 /3  Still on TPN with plan for PICC line   Follow CBC , renal function closely . Ok to discharge from [facility] . Subjective:       History of Present Illness  Patient is a 77 y.o.  female admitted with Sepsis due to urinary tract infection   who is seen in consult for the same . She presented w dysuria and lower abd pain for around a week associated with vomiting ,was found hypotensive with leukocytosis . CT suggested emphysematous cystitis,possible gastric outlet obstruction. CXR showed a right lower infiltrate. High CA-19-9,CEA  Allergy to [facility] w SOB   Urine culture  grew ESCHERICHIA COLI resistant to Ampicillin ,sensitive to all other tested ABXS . Blood cultures negative . Mycoplasma IGM 0.97   YEAST MODERATE GROWTH on sputum culture   S/P EGD   with reported esophagitis. GB US Findings suggesting acute cholecystitis. HIIDA showed absent gallbladder filling. She was extubated on   Status post cholecystectomy tube  by interventional radiology.     Interval history :  She denied abdominal pain, no fever,no new complaints   She is  still on TPN  No fever    WBC normal.    Past Medical History:   Diagnosis Date    Abnormal computed tomography of cervical spine     sclerotic bone appearance     CVA (cerebral vascular accident) ([address] left  side weakness    GERD (gastroesophageal reflux disease)     Hypertension     Paraproteinemia     Weight loss       Past Surgical History:   Procedure Laterality Date    INTUBATION  4/14/2019         PACEMAKER PLACEMENT  07/2011    Pacemaker is Medtronic Revo (compatible). Leads placed in 1995 are NOT MRI compatible. Placed at [facility]. V's per Dr. Eveline Antoine can not have an MRI.  UPPER GASTROINTESTINAL ENDOSCOPY N/A 4/16/2019    EGD ESOPHAGOGASTRODUODENOSCOPY @ BEDSIDE  ICU 2002 performed by Christie Harmon MD at [id number]          Admission Meds  No current facility-administered medications on file prior to encounter. Current Outpatient Medications on File Prior to Encounter   Medication Sig Dispense Refill    oxyCODONE-acetaminophen (PERCOCET) 5-325 MG per tablet Take 1 tablet by mouth every 6 hours as needed for Pain.  senna-docusate (PERICOLACE) 8.6-50 MG per tablet Take 1 tablet by mouth 2 times daily      budesonide-formoterol (SYMBICORT) 160-4.5 MCG/ACT AERO Inhale 2 puffs into the lungs 2 times daily      sucralfate (CARAFATE) 1 GM/10ML suspension Take 1 g by mouth 4 times daily       traZODone (DESYREL) 50 MG tablet Take 50 mg by mouth nightly      tiZANidine (ZANAFLEX) 2 MG tablet Take 2 mg by mouth every 8 hours as needed (left knee)       aspirin 81 MG tablet Take 81 mg by mouth daily      clopidogrel (PLAVIX) 75 MG tablet TAKE 1 TABLET DAILY 30 tablet 2    DOCQLACE 100 MG capsule TAKE 1 CAPSULE BY MOUTH IN THE MORNING & IN THE EVENING -DRINK PLENTYOF WATER WHILE TAKING THIS MEDICINE 30 capsule 1    amLODIPine (NORVASC) 10 MG tablet Take 10 mg by mouth daily.  LORazepam (ATIVAN) 0.5 MG tablet Take 0.5 mg by mouth every 6 hours as needed for Anxiety.  therapeutic multivitamin-minerals (THERAGRAN-M) tablet Take 1 tablet by mouth daily.  omeprazole (PRILOSEC) 20 MG capsule Take 20 mg by mouth daily.       venlafaxine (EFFEXOR) 37.5 MG tablet Take 37.5 mg by mouth 3 times daily.  Misc. Devices [facility]) [address] [place] wheelchair with left fupper extremity support  Dx: stroke with left hemiparesis. 1 each 0           Allergies  Allergies   Allergen Reactions    Penicillins Shortness Of Breath and Rash    Amoxicillin         Social   Social History     Tobacco Use    Smoking status: Current Some Day Smoker     Packs/day: 1.00     Years: 30.00     Pack years: 30.00    Smokeless tobacco: Never Used   Substance Use Topics    Alcohol use: Not Currently     Alcohol/week: 16.8 oz     Types: 28 Glasses of wine per week                History reviewed. No pertinent family history. Review of Systems  Other than above 12 systems reviewed negative . Tolerating antibiotics. Physical Exam  BP (!) 140/63   Pulse 96   Temp 98.4 °F (36.9 °C) (Oral)   Resp 17   Ht 5' (1.524 m)   Wt 132 lb 7.9 oz (60.1 kg)   SpO2 96%   BMI 25.88 kg/m²           General Appearance: alert ,NAD . Skin: warm and dry, no rash or erythema  Head: normocephalic and atraumatic  Eyes: pupils equal, round  Neck: neck supple and non tender   Pulmonary/Chest: coarse to auscultation bilaterally- no wheezes, rales or rhonchi  Cardiovascular: normal rate, regular rhythm, normal S1 and S2, no murmurs.   Abdomen: soft,mild upper abdomen tenderness  -distended,  no masses or organomegaly, gallbladder drain with  bile drainage   Extremities: no cyanosis, clubbing   Edema   Right IJ line       Data Review:    Recent Labs     04/28/19  0507 04/29/19  0510 04/30/19  0505   WBC 9.2 9.4 9.6   HGB 9.0* 8.9* 8.6*   HCT 27.0* 26.9* 26.4*   MCV 88.9 89.5 89.3   PLT 87* 84* 91*     Recent Labs     04/28/19  0507 04/29/19  0510 04/30/19  0505    132* 136   K 4.5 4.9 4.6    101 101   CO2 23 22 25   PHOS 3.3 3.7  --    BUN 19 22 21   CREATININE <0.40* <0.40* <0.40*     Recent Labs     04/28/19  0507 04/29/19  0510 04/30/19  0505   AST 20 17 16   ALT 15 15 15   BILITOT 0.59 0.43 0.34   ALKPHOS 86 94 106*     No results for input(s): LIPASE, AMYLASE in the last 72 hours. Recent Labs     04/28/19  0507 04/29/19  0510 04/30/19  0505   PROTIME 17.3* 15.3* 15.3*   INR 1.4 1.2 1.2       Imaging Studies:                           All appropriate imaging studies and reports reviewed: Yes       Ct Abdomen Pelvis Wo Contrast Additional Contrast? Oral    Result Date: 4/14/2019  EXAMINATION: CT OF THE ABDOMEN AND PELVIS WITHOUT CONTRAST 4/14/2019 7:34 pm TECHNIQUE: CT of the abdomen and pelvis was performed without the administration of intravenous contrast. Multiplanar reformatted images are provided for review. Dose modulation, iterative reconstruction, and/or weight based adjustment of the mA/kV was utilized to reduce the radiation dose to as low as reasonably achievable. COMPARISON: 04/11/2019 HISTORY: ORDERING SYSTEM PROVIDED HISTORY: ABDOMINAL PAIN TECHNOLOGIST PROVIDED HISTORY: Water soluble contrast only please Ordering Physician Provided Reason for Exam: Abdominal pain - Vented patient. Contrast given via nurse through NG tube. Acuity: Unknown Type of Exam: Unknown Relevant Medical/Surgical History: Hx - Sepsis due to urinary tract infection. FINDINGS: Lower Chest: New moderate layering bilateral pleural effusions with bilateral lower lobe atelectasis. Organs: Limited evaluation due lack of intravenous contrast.  Cholelithiasis redemonstrated. No gallbladder wall thickening or biliary ductal dilatation. Scattered tiny hypodense lesions in the liver are too small to characterize but statistically represent benign cysts or hemangiomas and appear unchanged. The pancreas, spleen, adrenal glands, and kidneys are unremarkable. There is no hydronephrosis or urinary tract calculus. GI/Bowel: The stomach is distended. Enteric tube is in place. No contrast is seen distal to the pylorus and there is contrast reflux into the distal esophagus. There is no evidence of bowel obstruction.   The appendix is not definitely visualized. No focal pericecal inflammatory changes are evident. Pelvis: The urinary bladder is decompressed by Tran catheter. No pelvic mass is seen. Peritoneum/Retroperitoneum: Small amount of free fluid in the pelvic cavity. No free air or focal fluid collection. No abnormal lymph node. Normal abdominal aortic caliber. Moderate calcific atherosclerosis. Bones/Soft Tissues: No acute osseous abnormality. Diffuse anasarca. Moderate degenerative changes in the lumbar spine. 1. Distended, contrast filled stomach with reflux of contrast into the esophagus. No enteric contrast is seen distal to the pylorus suggesting delayed gastric emptying in the setting of ileus. 2. New moderate layering bilateral pleural effusions with bilateral lower lobe atelectasis. 3. Small amount of nonspecific free fluid in the pelvic cavity. 4. Anasarca. 5. Cholelithiasis. Xr Chest (single View Frontal)    Result Date: 4/13/2019  EXAMINATION: SINGLE XRAY VIEW OF THE CHEST 4/13/2019 7:18 am COMPARISON: April 12, 2019 HISTORY: ORDERING SYSTEM PROVIDED HISTORY: dyspnea TECHNOLOGIST PROVIDED HISTORY: dyspnea Ordering Physician Provided Reason for Exam: dyspnea Acuity: Acute Type of Exam: Subsequent/Follow-up Additional signs and symptoms: dyspnea FINDINGS: A right IJ catheter is seen with its tip terminating at the superior cavoatrial junction. The left chest wall pacemaker and leads are stable. The cardiomediastinal silhouette is stable. There is interval increased opacity at the right lung base, may be related to atelectasis versus pneumonia. There is small atelectasis and mild pleural effusion at the left lung base. There is no pneumothorax. There is no acute osseous abnormality. Interval increased opacity at the right lung base, may be related to atelectasis versus pneumonia. Small atelectasis and mild pleural effusion at the left lung, new since the prior study.      Xr Femur Left (min 2 Views)    Result Date: 3/27/2019  EXAMINATION: 4 XRAY VIEWS OF THE LEFT FEMUR; 2 XRAY VIEWS OF THE LEFT KNEE; 3 XRAY VIEWS OF THE LEFT TIBIA AND FIBULA 3/27/2019 7:00 pm COMPARISON: Left hip 11/14/2015. HISTORY: ORDERING SYSTEM PROVIDED HISTORY: pain TECHNOLOGIST PROVIDED HISTORY: pain Ordering Physician Provided Reason for Exam: pt twisted wrong, lt knee pain, unable to straighten knee. Acuity: Acute Type of Exam: Initial FINDINGS: Left femur, four views: The bones are diffusely osteopenic. No acute fracture deformity. The hip joint is maintained. The femoral head projects within the acetabulum. No focal soft tissue abnormality is seen. Left knee, two views: The knee is flexed. No acute osseous abnormality. No joint effusion. Joint spaces are not optimally profiled but no significant arthritic changes are apparent. Left tib-fib, three views: There is evidence of a remote healed distal tibia fracture. No acute fracture or dislocation is seen. No focal soft tissue abnormality. No acute osseous abnormality of the left femur. No acute osseous abnormality of the left knee. No acute osseous abnormality of the left tib-fib. Healed remote distal tibia fracture. Marked osteopenia, likely due to disuse. Xr Knee Left (1-2 Views)    Result Date: 3/27/2019  EXAMINATION: 4 XRAY VIEWS OF THE LEFT FEMUR; 2 XRAY VIEWS OF THE LEFT KNEE; 3 XRAY VIEWS OF THE LEFT TIBIA AND FIBULA 3/27/2019 7:00 pm COMPARISON: Left hip 11/14/2015. HISTORY: ORDERING SYSTEM PROVIDED HISTORY: pain TECHNOLOGIST PROVIDED HISTORY: pain Ordering Physician Provided Reason for Exam: pt twisted wrong, lt knee pain, unable to straighten knee. Acuity: Acute Type of Exam: Initial FINDINGS: Left femur, four views: The bones are diffusely osteopenic. No acute fracture deformity. The hip joint is maintained. The femoral head projects within the acetabulum. No focal soft tissue abnormality is seen. Left knee, two views: The knee is flexed.   No acute osseous abnormality. No joint effusion. Joint spaces are not optimally profiled but no significant arthritic changes are apparent. Left tib-fib, three views: There is evidence of a remote healed distal tibia fracture. No acute fracture or dislocation is seen. No focal soft tissue abnormality. No acute osseous abnormality of the left femur. No acute osseous abnormality of the left knee. No acute osseous abnormality of the left tib-fib. Healed remote distal tibia fracture. Marked osteopenia, likely due to disuse. Xr Knee Left (3 Views)    Result Date: 3/30/2019  EXAMINATION: 3 XRAY VIEWS OF THE LEFT KNEE 3/30/2019 10:58 am COMPARISON: March 27, 2018 HISTORY: ORDERING SYSTEM PROVIDED HISTORY: F/u L knee pain after cast TECHNOLOGIST PROVIDED HISTORY: AP, oblique, lateral F/u L knee pain after cast FINDINGS: Marked osteopenia. Interval casting, which degrades fine osseous detail question cortical offset in the lateral femoral metaphysis. No malalignment identified. No significant joint effusion. Interval casting. Question nondisplaced fracture in the lateral femoral metaphysis. Osteopenia. Xr Tibia Fibula Left (2 Views)    Result Date: 3/27/2019  EXAMINATION: 4 XRAY VIEWS OF THE LEFT FEMUR; 2 XRAY VIEWS OF THE LEFT KNEE; 3 XRAY VIEWS OF THE LEFT TIBIA AND FIBULA 3/27/2019 7:00 pm COMPARISON: Left hip 11/14/2015. HISTORY: ORDERING SYSTEM PROVIDED HISTORY: pain TECHNOLOGIST PROVIDED HISTORY: pain Ordering Physician Provided Reason for Exam: pt twisted wrong, lt knee pain, unable to straighten knee. Acuity: Acute Type of Exam: Initial FINDINGS: Left femur, four views: The bones are diffusely osteopenic. No acute fracture deformity. The hip joint is maintained. The femoral head projects within the acetabulum. No focal soft tissue abnormality is seen. Left knee, two views: The knee is flexed. No acute osseous abnormality. No joint effusion.   Joint spaces are not optimally profiled but no significant 4/11/2019  EXAMINATION: CT OF THE ABDOMEN AND PELVIS WITH CONTRAST 4/11/2019 5:14 pm TECHNIQUE: CT of the abdomen and pelvis was performed with the administration of intravenous contrast. Multiplanar reformatted images are provided for review. Dose modulation, iterative reconstruction, and/or weight based adjustment of the mA/kV was utilized to reduce the radiation dose to as low as reasonably achievable. COMPARISON: None. HISTORY: ORDERING SYSTEM PROVIDED HISTORY: Abdominal pain TECHNOLOGIST PROVIDED HISTORY: IV Only Contrast Ordering Physician Provided Reason for Exam: patient c/o abd pain for an hour FINDINGS: Lower Chest: Trace pleural fluid bilaterally is noted. Indwelling cardiac pacemaker is present. Heart size is normal.  Coronary artery calcifications are evident. The esophagus is significantly dilated and fluid-filled. No paraesophageal adenopathy is evident. Organs: The spleen, pancreas, and adrenals are unremarkable. The liver contains hypodensities, likely cysts. The gallbladder is distended and contains a solitary gallstone. The kidneys excrete contrast bilaterally. Extrarenal collecting systems are noted. The ureters are mildly dilated down to the urinary bladder without evidence of intraluminal or ureteral obstructing calculi. GI/Bowel: Marked distention of the stomach is noted; this continues to the pylorus with appearance suggesting partial gastric outlet obstruction. Fluid is present in some small bowel loops and colon distal to the stomach. No small bowel obstruction. Pelvis: Marked distention of the urinary bladder is noted. 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 Distended ureters may be related to reflux or infection. No free pelvic fluid, pelvic or inguinal adenopathy is noted. Peritoneum/Retroperitoneum: No aortic aneurysm. Mild to moderate plaque is noted in the infrarenal abdominal aorta and both proximal iliac arteries.  Shotty lymph nodes are present around the aorta in the upper abdomen. No mesenteric adenopathy is noted. Bones/Soft Tissues: Degenerative changes are present in the hips and lower lumbar facets. Estimated biologic radiation dose for this procedure:258.77 mGy/cm2.     1. Dilatation of the thoracic esophagus filled with fluid. No obstructive process is noted. No adjacent enlarged lymph nodes. 2. Trace pleural fluid. 3. Marked distention of the stomach. This appears to extend to the pylorus. Partial gastric outlet obstruction is not excluded. 4. Bilateral dilated ureters without obstructing calculi. Urinary bladder is markedly distended with bladder wall air suggesting emphysematous cystitis. Dilatation of the ureters may be related to reflux or infection. 5. Atherosclerotic disease. 6. Other findings as above. Critical results were called by Dr. Cliff Stephens MD to [address] on 4/11/2019 at 17:37. Xr Chest Portable    Result Date: 4/16/2019  EXAMINATION: SINGLE XRAY VIEW OF THE CHEST 4/16/2019 6:54 am COMPARISON: 04/15/2019, 610 hours HISTORY: ORDERING SYSTEM PROVIDED HISTORY: ETT placement TECHNOLOGIST PROVIDED HISTORY: ETT placement Ordering Physician Provided Reason for Exam: on vent Acuity: Acute Type of Exam: Initial 76-year-old female on ventilator; check endotracheal tube placement FINDINGS: Portable AP upright view of the chest. Endotracheal tube distal tip overlying the mid trachea approximately 4.1 cm above the level of the ron. Enteric tube traverses the GE junction with distal tip excluded from the field of view. Left subclavian approach cardiac pacemaker device distal lead tips relatively stable in position. Right internal jugular approach central venous catheter distal tip overlying the high right atrium, stable. Cardiac monitor leads overlie the chest. Atherosclerotic calcification of the thoracic aorta. Slight stable volume loss of the left hemithorax. No pneumothorax. No free air.   Dense retrocardiac/left basilar airspace consolidation and small left-sided pleural effusion. Stable mild focal opacity at the right mid lung zone. Underlying COPD. Stable mild pulmonary vascular congestion and left-sided predominant parahilar opacity. Visualized osseous structures remain unchanged. 1. Stable multifocal airspace disease as detailed above with dense retrocardiac/left basilar airspace consolidation and small left-sided pleural effusion. Mild pulmonary vascular congestion. Findings may represent edema or multifocal pneumonia. 2. Underlying COPD. 3. Tubes and line as detailed above. Xr Chest Portable    Result Date: 4/15/2019  EXAMINATION: SINGLE XRAY VIEW OF THE CHEST 4/15/2019 6:47 am COMPARISON: 14 April 2019 HISTORY: ORDERING SYSTEM PROVIDED HISTORY: ETT placement TECHNOLOGIST PROVIDED HISTORY: ETT placement Ordering Physician Provided Reason for Exam: on vent Acuity: Acute Type of Exam: Initial FINDINGS: AP portable view of the chest time stamped at 612 hours demonstrates overlying cardiac monitoring electrodes. Endotracheal tube terminates 4 cm above the ron. Bipolar pacemaker enters from the left with intact leads in appropriate positions. Intestinal tube extends beyond the fundus of the stomach, tip not included. Right internal jugular catheter terminates at the cavoatrial junction. Heart size is normal.  Aortic arch is calcified. There is interval improvement in vascular congestion with resolution of perihilar opacities. Some bibasilar opacities remain. No extrapleural air is noted. Osseous structures are stable. Interval improvement in vascular congestion and bilateral opacities consistent with resolving pulmonary edema. Tubes and lines as above. Xr Chest Portable    Result Date: 4/14/2019  EXAMINATION: SINGLE XRAY VIEW OF THE CHEST 4/14/2019 7:57 am COMPARISON: Portable chest 04/13/2019.  HISTORY: ORDERING SYSTEM PROVIDED HISTORY: Intubation TECHNOLOGIST PROVIDED HISTORY: Intubation Ordering Physician Provided Reason for Exam: intubation Acuity: Acute Type of Exam: Initial Additional signs and symptoms: intubation FINDINGS: Endotracheal tube terminates over the midthoracic trachea. Dual-chamber pacemaker leads appear unchanged in position. Right IJ approach central venous catheter unchanged in position. Heart size not substantially changed. Perihilar and basilar opacities further increased. Left pleural effusion increased in size. Findings may reflect pulmonary edema, progressed from yesterday's exam.  Left pleural effusion increased in size. Xr Chest Portable    Result Date: 4/12/2019  EXAMINATION: SINGLE XRAY VIEW OF THE CHEST 4/12/2019 5:26 am COMPARISON: November 14, 2015. HISTORY: ORDERING SYSTEM PROVIDED HISTORY: line placement TECHNOLOGIST PROVIDED HISTORY: line placement Ordering Physician Provided Reason for Exam: New right side line placement. Acuity: Acute Type of Exam: Initial Additional signs and symptoms: New right side line placement. FINDINGS: Stable left pectoral trans venous cardiac pacer device. New right IJ central venous catheter with tip near the superior atrial caval junction. Normal lung volume. No new consolidation. Curvilinear radiopacity projecting over the right upper lobe likely represents artifact from a skin fold. No pleural effusion or pneumothorax. Stable cardiomediastinal silhouette and great vessels with redemonstration of atherosclerotic thoracic aorta. New right IJ central venous catheter with tip near the superior atrial caval junction. No pneumothorax. No new consolidation. Thank you for allowing me to participate in the care of your patient. Please feel free to contact me with any questions or concerns.      Binu Bullard MD

## 2019-04-30 NOTE — FLOWSHEET NOTE
04/30/19 0200   Oxygen Therapy   SpO2 93 %   O2 Device None (Room air)   O2 rechecked. No s/s of distress, pt resting with eyes closed. Will continue to monitor.

## 2019-04-30 NOTE — PROGRESS NOTES
[address] [facility].    PROGRESS NOTE             4/30/2019    7:51 AM    Name:   Angelika Jay  MRN:     [id number]     Acct:      [de-identified]   Room:   [address] [place] [address] Day:  23  Admit Date:  4/11/2019  2:48 PM    PCP:  Erinn Tilley DO  Code Status:  Full Code    Subjective: Interval History Status: improved. Pt seen and examined. No acute events overnight. Denies any CP, SOB, abdominal pain. Brief History:     Per EMR:     The patient is a 77 y.o.  Female who presents withAbdominal Pain   and she is admitted to the hospital for the management of abdominal distension, nausea, vomiting, and acute cystitis.      Patient presents with chills, nausea, vomiting, abdominal pain (diffuse, but worse in the suprapubic region), abdominal distension, and dysuria of 2-3 days duration. Denies hematemesis. Acknowledges difficulty keeping food down but tolerating fluids. States abdominal pain is a 6/10 on average, and denies trying any medication to alleviate her sx.      Denies recent antibiotic use or steroid use.      ED: Tachycardic 100-114 BP, WBC 47.9 w/neutrophils 88,  UCx from 4/2 + E. Coli > 100,000 w/suceptibility to cipro. Review of Systems:     Review of Systems   Constitutional: Negative for chills and fever. HENT: Negative for sore throat. Eyes: Negative for redness. Respiratory: Negative for cough and shortness of breath. Cardiovascular: Negative for chest pain and leg swelling. Gastrointestinal: Negative for constipation, diarrhea, nausea and vomiting. Genitourinary: Negative for dysuria and hematuria. Musculoskeletal: Negative for arthralgias. Skin: Negative for rash. Neurological: Negative for headaches. Hematological: Negative for adenopathy. Medications: Allergies:     Allergies   Allergen Reactions    Penicillins Shortness Of Breath and Rash    Amoxicillin        Current Meds:   Scheduled Meds:    mometasone-formoterol  2 puff Inhalation BID    pantoprazole  40 mg Oral QAM AC    metoprolol tartrate  12.5 mg Oral BID    fluconazole  200 mg Oral Daily    predniSONE  20 mg Oral Daily    metoclopramide  10 mg Intravenous Q6H    fat emulsion  100 mL Intravenous Daily    [Held by provider] furosemide  40 mg Intravenous Daily    magnesium sulfate  1 g Intravenous Once    ipratropium  0.5 mg Nebulization Q4H    insulin lispro  0-12 Units Subcutaneous Q6H    levalbuterol  1.25 mg Nebulization Q4H    venlafaxine  37.5 mg Oral TID    clopidogrel  75 mg Oral Daily    aspirin  81 mg Oral Daily    sodium chloride flush  10 mL Intravenous 2 times per day    enoxaparin  40 mg Subcutaneous Daily     Continuous Infusions:    PN-Adult 2-in-1 Central Line (Standard) 65 mL/hr at 04/29/19 1817    sodium chloride 10 mL/hr at 04/30/19 0029    dextrose       PRN Meds: hydrOXYzine, metoprolol, sodium chloride nebulizer, fentanNYL, oxyCODONE-acetaminophen, magnesium sulfate, sodium phosphate IVPB **OR** sodium phosphate IVPB, potassium chloride **OR** potassium alternative oral replacement **OR** potassium chloride, glucose, dextrose, glucagon (rDNA), dextrose, milk and molasses, sodium chloride flush, acetaminophen    Data:     Past Medical History:   has a past medical history of Abnormal computed tomography of cervical spine, CVA (cerebral vascular accident) ([address] GERD (gastroesophageal reflux disease), Hypertension, Paraproteinemia, and Weight loss. Social History:   reports that she has been smoking. She has a 30.00 pack-year smoking history. She has never used smokeless tobacco. She reports that she drank about 16.8 oz of alcohol per week. She reports that she does not use drugs. Family History: History reviewed. No pertinent family history.     Vitals:  BP (!) 140/63   Pulse 96   Temp 98.4 °F (36.9 °C) (Oral)   Resp 16   Ht 5' (1.524 m)   Wt 132 lb 7.9 oz (60.1 kg) apparent. Left tib-fib, three views: There is evidence of a remote healed distal tibia fracture. No acute fracture or dislocation is seen. No focal soft tissue abnormality. No acute osseous abnormality of the left femur. No acute osseous abnormality of the left knee. No acute osseous abnormality of the left tib-fib. Healed remote distal tibia fracture. Marked osteopenia, likely due to disuse. Xr Knee Left (1-2 Views)    Result Date: 3/27/2019  EXAMINATION: 4 XRAY VIEWS OF THE LEFT FEMUR; 2 XRAY VIEWS OF THE LEFT KNEE; 3 XRAY VIEWS OF THE LEFT TIBIA AND FIBULA 3/27/2019 7:00 pm COMPARISON: Left hip 11/14/2015. HISTORY: ORDERING SYSTEM PROVIDED HISTORY: pain TECHNOLOGIST PROVIDED HISTORY: pain Ordering Physician Provided Reason for Exam: pt twisted wrong, lt knee pain, unable to straighten knee. Acuity: Acute Type of Exam: Initial FINDINGS: Left femur, four views: The bones are diffusely osteopenic. No acute fracture deformity. The hip joint is maintained. The femoral head projects within the acetabulum. No focal soft tissue abnormality is seen. Left knee, two views: The knee is flexed. No acute osseous abnormality. No joint effusion. Joint spaces are not optimally profiled but no significant arthritic changes are apparent. Left tib-fib, three views: There is evidence of a remote healed distal tibia fracture. No acute fracture or dislocation is seen. No focal soft tissue abnormality. No acute osseous abnormality of the left femur. No acute osseous abnormality of the left knee. No acute osseous abnormality of the left tib-fib. Healed remote distal tibia fracture. Marked osteopenia, likely due to disuse.      Xr Knee Left (3 Views)    Result Date: 3/30/2019  EXAMINATION: 3 XRAY VIEWS OF THE LEFT KNEE 3/30/2019 10:58 am COMPARISON: March 27, 2018 HISTORY: ORDERING SYSTEM PROVIDED HISTORY: F/u L knee pain after cast TECHNOLOGIST PROVIDED HISTORY: AP, oblique, lateral F/u L knee pain after cast FINDINGS: Marked osteopenia. Interval casting, which degrades fine osseous detail question cortical offset in the lateral femoral metaphysis. No malalignment identified. No significant joint effusion. Interval casting. Question nondisplaced fracture in the lateral femoral metaphysis. Osteopenia. Xr Tibia Fibula Left (2 Views)    Result Date: 3/27/2019  EXAMINATION: 4 XRAY VIEWS OF THE LEFT FEMUR; 2 XRAY VIEWS OF THE LEFT KNEE; 3 XRAY VIEWS OF THE LEFT TIBIA AND FIBULA 3/27/2019 7:00 pm COMPARISON: Left hip 11/14/2015. HISTORY: ORDERING SYSTEM PROVIDED HISTORY: pain TECHNOLOGIST PROVIDED HISTORY: pain Ordering Physician Provided Reason for Exam: pt twisted wrong, lt knee pain, unable to straighten knee. Acuity: Acute Type of Exam: Initial FINDINGS: Left femur, four views: The bones are diffusely osteopenic. No acute fracture deformity. The hip joint is maintained. The femoral head projects within the acetabulum. No focal soft tissue abnormality is seen. Left knee, two views: The knee is flexed. No acute osseous abnormality. No joint effusion. Joint spaces are not optimally profiled but no significant arthritic changes are apparent. Left tib-fib, three views: There is evidence of a remote healed distal tibia fracture. No acute fracture or dislocation is seen. No focal soft tissue abnormality. No acute osseous abnormality of the left femur. No acute osseous abnormality of the left knee. No acute osseous abnormality of the left tib-fib. Healed remote distal tibia fracture. Marked osteopenia, likely due to disuse. Ct Abdomen Pelvis W Iv Contrast    Result Date: 4/11/2019  EXAMINATION: CT OF THE ABDOMEN AND PELVIS WITH CONTRAST 4/11/2019 5:14 pm TECHNIQUE: CT of the abdomen and pelvis was performed with the administration of intravenous contrast. Multiplanar reformatted images are provided for review.  Dose modulation, iterative reconstruction, and/or weight based adjustment of the mA/kV was utilized to reduce the radiation dose to as low as reasonably achievable. COMPARISON: None. HISTORY: ORDERING SYSTEM PROVIDED HISTORY: Abdominal pain TECHNOLOGIST PROVIDED HISTORY: IV Only Contrast Ordering Physician Provided Reason for Exam: patient c/o abd pain for an hour FINDINGS: Lower Chest: Trace pleural fluid bilaterally is noted. Indwelling cardiac pacemaker is present. Heart size is normal.  Coronary artery calcifications are evident. The esophagus is significantly dilated and fluid-filled. No paraesophageal adenopathy is evident. Organs: The spleen, pancreas, and adrenals are unremarkable. The liver contains hypodensities, likely cysts. The gallbladder is distended and contains a solitary gallstone. The kidneys excrete contrast bilaterally. Extrarenal collecting systems are noted. The ureters are mildly dilated down to the urinary bladder without evidence of intraluminal or ureteral obstructing calculi. GI/Bowel: Marked distention of the stomach is noted; this continues to the pylorus with appearance suggesting partial gastric outlet obstruction. Fluid is present in some small bowel loops and colon distal to the stomach. No small bowel obstruction. Pelvis: Marked distention of the urinary bladder is noted. 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 Distended ureters may be related to reflux or infection. No free pelvic fluid, pelvic or inguinal adenopathy is noted. Peritoneum/Retroperitoneum: No aortic aneurysm. Mild to moderate plaque is noted in the infrarenal abdominal aorta and both proximal iliac arteries. Shotty lymph nodes are present around the aorta in the upper abdomen. No mesenteric adenopathy is noted. Bones/Soft Tissues: Degenerative changes are present in the hips and lower lumbar facets. Estimated biologic radiation dose for this procedure:258.77 mGy/cm2.     1. Dilatation of the thoracic esophagus filled with fluid.  No obstructive Cardiovascular: Regular rhythm, normal heart sounds and intact distal pulses. Tachycardia present. Exam reveals no gallop and no friction rub. No murmur heard. Pulmonary/Chest: Effort normal and breath sounds normal. No stridor. No respiratory distress. She has no wheezes. She has no rales. Intubated and mechanically ventilated. Abdominal: Soft. Bowel sounds are normal. She exhibits no distension and no mass. There is no tenderness. There is no rebound and no guarding. Cholecystostomy tube in place. Musculoskeletal: Normal range of motion. She exhibits no edema (2+ b/l pitting edema in upper and lower ext. ). Left arm gloved 2/2 edema. Left knee wrapped in dressing. Posterior bruising noted. Neurological: She is alert. Skin: Skin is warm and dry. Capillary refill takes less than 2 seconds. She is not diaphoretic. No erythema. No pallor. Nursing note and vitals reviewed. 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 Coli Emphysematous Cystitis + MRSA PNA   WBC 9.4   .1 --> 86   Procal 0.15   Diflucan   ECHO 4/12: LVEF 45%, RVSP 36 mmHg   Urology consult, appreciate recs --> DC'd dyer   Critical care consult, appreciate recs   Gen Surg, right IJ central line placed on 4/12 --> recommend

## 2019-04-30 NOTE — PROGRESS NOTES
Pt refusing to sign consent for PICC placement. Dr. Lexa Mccray notified; instructed RN to speak with primary. RN notified residents; stated they will come speak to her.

## 2019-04-30 NOTE — FLOWSHEET NOTE
04/30/19 0012   Oxygen Therapy   SpO2 95 %   O2 Device None (Room air)   Pt refused NC at this time. RN made pt aware that her oxygen level will be rechecked in an hour.

## 2019-04-30 NOTE — FLOWSHEET NOTE
04/30/19 0300   Oxygen Therapy   SpO2 90 %   O2 Device None (Room air)   RN notes O2 has decreased, makes pt aware. Pt showing no s/s of distress. RN advises pt to wear oxygen at this time, pt agrees. O2 95% 1 min after NC reinserted. Pt resting comfortably at this time.

## 2019-04-30 NOTE — CARE COORDINATION
RODGER received a call from [address] [place] [address] with [address] and she reported that as long as this patient is on TPN, they would not be able to accept. RODGER informed them that they are supposed to be weaning this off. RODGER agreed to keep them informed. SW following.

## 2019-04-30 NOTE — PLAN OF CARE
Problem: Risk for Impaired Skin Integrity  Goal: Tissue integrity - skin and mucous membranes  Description  Structural intactness and normal physiological function of skin and  mucous membranes.   4/30/2019 0309 by Mindy Manriquez RN  Outcome: Ongoing     Problem: Falls - Risk of:  Goal: Will remain free from falls  Description  Will remain free from falls  4/30/2019 0309 by Mindy Manriquez RN  Outcome: Ongoing     Problem: Falls - Risk of:  Goal: Absence of physical injury  Description  Absence of physical injury  4/30/2019 0309 by Mindy Manriquez RN  Outcome: Ongoing     Problem: Infection, Septic Shock:  Goal: Will show no infection signs and symptoms  Description  Will show no infection signs and symptoms  4/30/2019 0309 by Mindy Manriquez RN  Outcome: Ongoing     Problem: Tissue Perfusion, Altered:  Goal: Circulatory function within specified parameters  Description  Circulatory function within specified parameters  4/30/2019 0309 by Mindy Manriquez RN  Outcome: Ongoing     Problem: Pain:  Goal: Pain level will decrease  Description  Pain level will decrease  4/30/2019 0309 by Mindy Manriquez RN  Outcome: Ongoing     Problem: Pain:  Goal: Control of acute pain  Description  Control of acute pain  4/30/2019 0309 by Mindy Manriquez RN  Outcome: Ongoing     Problem: Pain:  Goal: Control of chronic pain  Description  Control of chronic pain  4/30/2019 0309 by Mindy Manriquez RN  Outcome: Ongoing     Problem: Nutrition  Goal: Optimal nutrition therapy  Description       4/30/2019 0309 by Mindy Manriquez RN  Outcome: Ongoing

## 2019-04-30 NOTE — PROGRESS NOTES
Nutrition Assessment (Parenteral Nutrition)    Type and Reason for Visit: Reassess    Nutrition Recommendations: Continue TPN and lipids with the following adjustments to additives: Insulin 8 to 0 units, remove MVI and Trace Elements. Continue current diet and oral nutrition supplements. Nutrition Assessment: Pt remains stable from a nutritional viewpoint with TPN and lipids providing the majority of nutrition support. PO intake remains poor. Plan for PICC line (with discontinuation of other Central line). A few adjustments made to TPN additives; triglyceride level to be checked. Malnutrition Assessment:  · Malnutrition Status: At risk for malnutrition  · Context: Acute illness or injury  · Findings of the 6 clinical characteristics of malnutrition (Minimum of 2 out of 6 clinical characteristics is required to make the diagnosis of moderate or severe Protein Calorie Malnutrition based on AND/ASPEN Guidelines):  1. Energy Intake-Less than or equal to 75% of estimated energy requirement(Previously; improving with parenteral nutrition), Greater than or equal to 5 days    2. Weight Loss-Unable to assess(edema present),    3. Fat Loss-Unable to assess,    4. Muscle Loss-Unable to assess,    5. Fluid Accumulation-Mild fluid accumulation, Extremities  6.  Strength-Not measured    Nutrition Risk Level: High    Nutrient Needs:  · Estimated Daily Total Kcal: 1893-5620 based on wt of 25-27 per kg using wt of 57.2 kg  · Estimated Daily Protein (g): 63-68 based on 1.4-1.5 gm/kg IBW(revised)    Nutrition Diagnosis:   · Problem: Inadequate oral intake  · Etiology: related to Alteration in GI function, Insufficient energy/nutrient consumption     Signs and symptoms:  as evidenced by Nutrition support - PN, Intake 0-25%, GI abnormality    Objective Information:  · Nutrition-Focused Physical Findings: Loss of appetite. Edema: +1 RUE, LUE, RLE, LLE.    · Wound Type: Stage I, Pressure Ulcer, Deep Tissue Injury, Multiple  · Current Nutrition Therapies:  · Oral Diet Orders: Low Fat   · Oral Diet intake: 1-25%  · Oral Nutrition Supplement (ONS) Orders: Low Calorie High Protein Supplement  · ONS intake: 1-25%  · Parenteral Nutrition Orders:  · Type and Formula: Premix Central, 2-in-1 Custom   · Lipids: 100ml, Daily  · Rate/Volume: 65 ml/ 1560 ml daily  · Duration: Continuous  · Current and goal PN Order Provides: 1573 kcals, 78 gm of protein (lipids included)  · Anthropometric Measures:  · Ht: 5' (152.4 cm)   · Current Body Wt: 132 lb 7.9 oz (60.1 kg)  · Admission Body Wt: 102 lb (46.3 kg)  · Ideal Body Wt: 100 lb (45.4 kg), % Ideal Body 102% based on admission wt  · BMI Classification: BMI 18.5 - 24.9 Normal Weight(Based on admission wt)    Nutrition Interventions:   Continue current diet, Continue current ONS  Continued Inpatient Monitoring    Nutrition Evaluation:   · Evaluation: Progressing toward goals   · Goals: PN to meet greater 90% of estimated nutrition needs   · Monitoring: Nutrition Progression, Meal Intake, Supplement Intake, Diet Tolerance, PN Intake, PN Tolerance, Wound Healing, I&O, Weight, Pertinent Labs, Diarrhea, Nausea or Vomiting, Monitor Bowel Function    Maren Stark R.D., L.D.   Phone: [phone]

## 2019-04-30 NOTE — PROGRESS NOTES
Pulmonary Progress Note  [facility]ists      Patient - Mo Gonsalez,  Age - 77 y.o.    - 1950      Room Number - 2123/2123-01   N -  [id number]   [facility]  Date of Admission -  2019  2:48 PM        Consulting Ele Monterroso MD  Primary Care Physician - Maggy Gates, DO     SUBJECTIVE   Denies any dyspnea    OBJECTIVE   VITALS    height is 5' (1.524 m) and weight is 132 lb 7.9 oz (60.1 kg). Her oral temperature is 98.6 °F (37 °C). Her blood pressure is 140/59 (abnormal) and her pulse is 94. Her respiration is 17 and oxygen saturation is 96%. Body mass index is 25.88 kg/m². Temperature Range: Temp: 98.6 °F (37 °C) Temp  Av.2 °F (36.8 °C)  Min: 97.8 °F (36.6 °C)  Max: 98.6 °F (37 °C)  BP Range:  Systolic (91SFT), WSD:976 , Min:115 , WWE:749     Diastolic (30AXT), TJF:52, Min:59, Max:70    Pulse Range: Pulse  Av  Min: 85  Max: 109  Respiration Range: Resp  Av.1  Min: 14  Max: 21  Current Pulse Ox[de-identified]  SpO2: 96 %  24HR Pulse Ox Range:  SpO2  Av.8 %  Min: 90 %  Max: 97 %  Oxygen Amount and Delivery: O2 Flow Rate (L/min): 2 L/min    Wt Readings from Last 3 Encounters:   19 132 lb 7.9 oz (60.1 kg)   19 89 lb (40.4 kg)   19 94 lb 1.6 oz (42.7 kg)       I/O (24 Hours)    Intake/Output Summary (Last 24 hours) at 2019 1715  Last data filed at 2019 1316  Gross per 24 hour   Intake 3179.01 ml   Output 1325 ml   Net 1854.01 ml       EXAM     General Appearance  Awake, alert, oriented, in no acute distress, thin and frail  HEENT - normocephalic, atraumatic.  []  Mallampati  [] Crowded airway   [] Macroglossia  []  Retrognathia  [] Micrognathia  []  Normal tongue size []  Normal Bite  [] Menard sign positive    Neck - Supple,  trachea midline   Lungs - diminished  Cardiovascular - Heart sounds are normal.  Regular rate and rhythm   Abdomen - Soft, nontender, nondistended, no masses or organomegaly  Neurologic - There are no focal motor or sensory deficits  Skin - No bruising or bleeding  Extremities - No clubbing, cyanosis, edema    MEDS      mometasone-formoterol  2 puff Inhalation BID    pantoprazole  40 mg Oral QAM AC    metoprolol tartrate  12.5 mg Oral BID    fluconazole  200 mg Oral Daily    predniSONE  20 mg Oral Daily    metoclopramide  10 mg Intravenous Q6H    fat emulsion  100 mL Intravenous Daily    [Held by provider] furosemide  40 mg Intravenous Daily    magnesium sulfate  1 g Intravenous Once    ipratropium  0.5 mg Nebulization Q4H    insulin lispro  0-12 Units Subcutaneous Q6H    levalbuterol  1.25 mg Nebulization Q4H    venlafaxine  37.5 mg Oral TID    clopidogrel  75 mg Oral Daily    aspirin  81 mg Oral Daily    sodium chloride flush  10 mL Intravenous 2 times per day    enoxaparin  40 mg Subcutaneous Daily      PN-Adult 2-in-1 Central Line (Standard)      PN-Adult 2-in-1 [facility] (Standard) 65 mL/hr at 04/29/19 1817    sodium chloride 10 mL/hr at 04/30/19 0029    dextrose       hydrOXYzine, metoprolol, sodium chloride nebulizer, fentanNYL, oxyCODONE-acetaminophen, magnesium sulfate, sodium phosphate IVPB **OR** sodium phosphate IVPB, potassium chloride **OR** potassium alternative oral replacement **OR** potassium chloride, glucose, dextrose, glucagon (rDNA), dextrose, milk and molasses, sodium chloride flush, acetaminophen    LABS   CBC   Recent Labs     04/30/19  0505   WBC 9.6   HGB 8.6*   HCT 26.4*   MCV 89.3   PLT 91*     BMP:   Lab Results   Component Value Date     04/30/2019    K 4.6 04/30/2019     04/30/2019    CO2 25 04/30/2019    BUN 21 04/30/2019    LABALBU 1.8 04/30/2019    LABALBU 4.6 05/17/2012    CREATININE <0.40 04/30/2019    CALCIUM 7.5 04/30/2019    GFRAA CANNOT BE CALCULATED 04/30/2019    LABGLOM CANNOT BE CALCULATED 04/30/2019     ABGs:  Lab Results   Component Value Date    PHART 7.519

## 2019-04-30 NOTE — PROGRESS NOTES
Medicine resident spoke with pt regarding PICC; pt now agrees. RN notified IR; they will let floor know when pt can get done.

## 2019-04-30 NOTE — PLAN OF CARE
Problem: Risk for Impaired Skin Integrity  Goal: Tissue integrity - skin and mucous membranes  Description  Structural intactness and normal physiological function of skin and  mucous membranes.   4/30/2019 1444 by Cassidy Connors RN  Outcome: Ongoing  4/30/2019 0309 by Yulisa Tracy RN  Outcome: Ongoing     Problem: Falls - Risk of:  Goal: Will remain free from falls  Description  Will remain free from falls  4/30/2019 1444 by Cassidy Connors RN  Outcome: Ongoing     Problem: Falls - Risk of:  Goal: Absence of physical injury  Description  Absence of physical injury  4/30/2019 1444 by Cassidy Connors RN  Outcome: Ongoing     Problem: Infection, Septic Shock:  Goal: Will show no infection signs and symptoms  Description  Will show no infection signs and symptoms  4/30/2019 1444 by Cassidy Connors RN  Outcome: Ongoing     Problem: Tissue Perfusion, Altered:  Goal: Circulatory function within specified parameters  Description  Circulatory function within specified parameters  4/30/2019 1444 by Cassidy Connors RN  Outcome: Ongoing     Problem: Pain:  Goal: Pain level will decrease  Description  Pain level will decrease  4/30/2019 1444 by Cassidy Connors RN  Outcome: Ongoing     Problem: Pain:  Goal: Control of acute pain  Description  Control of acute pain  4/30/2019 1444 by Cassidy Connors RN  Outcome: Ongoing     Problem: Pain:  Goal: Control of chronic pain  Description  Control of chronic pain  4/30/2019 1444 by Cassidy Connors RN  Outcome: Ongoing     Problem: Nutrition  Goal: Optimal nutrition therapy  Description       4/30/2019 1444 by Cassidy Connors RN  Outcome: Ongoing

## 2019-04-30 NOTE — PROGRESS NOTES
Pt refusing PICC line placement. Reason for placement explained to pt. Pt continues to refuse. Report called to PCU.

## 2019-04-30 NOTE — CARE COORDINATION
DISCHARGE PLANNING NOTE:    LSW is following for discharge [address] They cannot take patient while she is on TPN. PICC line is planned. Dietician did nutrition assessment and TPN is continued. MINESH NEEDS SIGNED AND COMPLETED. Will continue to follow with LSW for discharge needs.

## 2019-05-01 ENCOUNTER — TELEPHONE (OUTPATIENT)
Dept: ORTHOPEDIC SURGERY | Age: 69
End: 2019-05-01

## 2019-05-01 LAB
ABSOLUTE EOS #: 0.1 K/UL (ref 0–0.4)
ABSOLUTE IMMATURE GRANULOCYTE: ABNORMAL K/UL (ref 0–0.3)
ABSOLUTE LYMPH #: 0.6 K/UL (ref 1–4.8)
ABSOLUTE MONO #: 0.2 K/UL (ref 0.1–1.3)
ALBUMIN SERPL-MCNC: 1.7 G/DL (ref 3.5–5.2)
ALBUMIN/GLOBULIN RATIO: ABNORMAL (ref 1–2.5)
ALP BLD-CCNC: 97 U/L (ref 35–104)
ALT SERPL-CCNC: 13 U/L (ref 5–33)
ANION GAP SERPL CALCULATED.3IONS-SCNC: 7 MMOL/L (ref 9–17)
ANION GAP SERPL CALCULATED.3IONS-SCNC: 9 MMOL/L (ref 9–17)
AST SERPL-CCNC: 17 U/L
BASOPHILS # BLD: 0 % (ref 0–2)
BASOPHILS ABSOLUTE: 0 K/UL (ref 0–0.2)
BILIRUB SERPL-MCNC: 0.32 MG/DL (ref 0.3–1.2)
BUN BLDV-MCNC: 18 MG/DL (ref 8–23)
BUN BLDV-MCNC: 18 MG/DL (ref 8–23)
BUN/CREAT BLD: ABNORMAL (ref 9–20)
BUN/CREAT BLD: ABNORMAL (ref 9–20)
C-REACTIVE PROTEIN: 89.4 MG/L (ref 0–5)
CALCIUM SERPL-MCNC: 7.2 MG/DL (ref 8.6–10.4)
CALCIUM SERPL-MCNC: 7.2 MG/DL (ref 8.6–10.4)
CHLORIDE BLD-SCNC: 99 MMOL/L (ref 98–107)
CHLORIDE BLD-SCNC: 99 MMOL/L (ref 98–107)
CO2: 24 MMOL/L (ref 20–31)
CO2: 25 MMOL/L (ref 20–31)
CREAT SERPL-MCNC: <0.4 MG/DL (ref 0.5–0.9)
CREAT SERPL-MCNC: <0.4 MG/DL (ref 0.5–0.9)
DIFFERENTIAL TYPE: ABNORMAL
EOSINOPHILS RELATIVE PERCENT: 1 % (ref 0–4)
GFR AFRICAN AMERICAN: ABNORMAL ML/MIN
GFR AFRICAN AMERICAN: ABNORMAL ML/MIN
GFR NON-AFRICAN AMERICAN: ABNORMAL ML/MIN
GFR NON-AFRICAN AMERICAN: ABNORMAL ML/MIN
GFR SERPL CREATININE-BSD FRML MDRD: ABNORMAL ML/MIN/{1.73_M2}
GLUCOSE BLD-MCNC: 124 MG/DL (ref 65–105)
GLUCOSE BLD-MCNC: 125 MG/DL (ref 70–99)
GLUCOSE BLD-MCNC: 126 MG/DL (ref 70–99)
GLUCOSE BLD-MCNC: 127 MG/DL (ref 65–105)
GLUCOSE BLD-MCNC: 128 MG/DL (ref 65–105)
GLUCOSE BLD-MCNC: 131 MG/DL (ref 65–105)
GLUCOSE BLD-MCNC: 137 MG/DL (ref 65–105)
HCT VFR BLD CALC: 24 % (ref 36–46)
HEMOGLOBIN: 8.2 G/DL (ref 12–16)
IMMATURE GRANULOCYTES: ABNORMAL %
INR BLD: 1.2
LYMPHOCYTES # BLD: 7 % (ref 24–44)
MAGNESIUM: 1.6 MG/DL (ref 1.6–2.6)
MCH RBC QN AUTO: 30.1 PG (ref 26–34)
MCHC RBC AUTO-ENTMCNC: 34.3 G/DL (ref 31–37)
MCV RBC AUTO: 87.8 FL (ref 80–100)
MONOCYTES # BLD: 3 % (ref 1–7)
NRBC AUTOMATED: ABNORMAL PER 100 WBC
PDW BLD-RTO: 15.7 % (ref 11.5–14.9)
PHOSPHORUS: 3.2 MG/DL (ref 2.6–4.5)
PLATELET # BLD: 92 K/UL (ref 150–450)
PLATELET ESTIMATE: ABNORMAL
PMV BLD AUTO: 9.1 FL (ref 6–12)
POTASSIUM SERPL-SCNC: 4.1 MMOL/L (ref 3.7–5.3)
POTASSIUM SERPL-SCNC: 4.1 MMOL/L (ref 3.7–5.3)
PROCALCITONIN: 0.11 NG/ML
PROTHROMBIN TIME: 15.5 SEC (ref 11.8–14.6)
RBC # BLD: 2.74 M/UL (ref 4–5.2)
RBC # BLD: ABNORMAL 10*6/UL
SEDIMENTATION RATE, ERYTHROCYTE: 88 MM (ref 0–20)
SEG NEUTROPHILS: 89 % (ref 36–66)
SEGMENTED NEUTROPHILS ABSOLUTE COUNT: 7.5 K/UL (ref 1.3–9.1)
SODIUM BLD-SCNC: 131 MMOL/L (ref 135–144)
SODIUM BLD-SCNC: 132 MMOL/L (ref 135–144)
TOTAL PROTEIN: 4.4 G/DL (ref 6.4–8.3)
TRIGL SERPL-MCNC: 154 MG/DL
WBC # BLD: 8.4 K/UL (ref 3.5–11)
WBC # BLD: ABNORMAL 10*3/UL

## 2019-05-01 PROCEDURE — 85610 PROTHROMBIN TIME: CPT

## 2019-05-01 PROCEDURE — 84145 PROCALCITONIN (PCT): CPT

## 2019-05-01 PROCEDURE — 84100 ASSAY OF PHOSPHORUS: CPT

## 2019-05-01 PROCEDURE — 6370000000 HC RX 637 (ALT 250 FOR IP): Performed by: INTERNAL MEDICINE

## 2019-05-01 PROCEDURE — 85025 COMPLETE CBC W/AUTO DIFF WBC: CPT

## 2019-05-01 PROCEDURE — 85651 RBC SED RATE NONAUTOMATED: CPT

## 2019-05-01 PROCEDURE — 2500000003 HC RX 250 WO HCPCS: Performed by: STUDENT IN AN ORGANIZED HEALTH CARE EDUCATION/TRAINING PROGRAM

## 2019-05-01 PROCEDURE — 80048 BASIC METABOLIC PNL TOTAL CA: CPT

## 2019-05-01 PROCEDURE — 36415 COLL VENOUS BLD VENIPUNCTURE: CPT

## 2019-05-01 PROCEDURE — 6360000002 HC RX W HCPCS: Performed by: INTERNAL MEDICINE

## 2019-05-01 PROCEDURE — 99233 SBSQ HOSP IP/OBS HIGH 50: CPT | Performed by: INTERNAL MEDICINE

## 2019-05-01 PROCEDURE — 93005 ELECTROCARDIOGRAM TRACING: CPT

## 2019-05-01 PROCEDURE — 86140 C-REACTIVE PROTEIN: CPT

## 2019-05-01 PROCEDURE — 2500000003 HC RX 250 WO HCPCS: Performed by: SURGERY

## 2019-05-01 PROCEDURE — 97530 THERAPEUTIC ACTIVITIES: CPT

## 2019-05-01 PROCEDURE — 2580000003 HC RX 258: Performed by: INTERNAL MEDICINE

## 2019-05-01 PROCEDURE — 99232 SBSQ HOSP IP/OBS MODERATE 35: CPT | Performed by: INTERNAL MEDICINE

## 2019-05-01 PROCEDURE — 83735 ASSAY OF MAGNESIUM: CPT

## 2019-05-01 PROCEDURE — 97535 SELF CARE MNGMENT TRAINING: CPT

## 2019-05-01 PROCEDURE — 80053 COMPREHEN METABOLIC PANEL: CPT

## 2019-05-01 PROCEDURE — 6370000000 HC RX 637 (ALT 250 FOR IP): Performed by: STUDENT IN AN ORGANIZED HEALTH CARE EDUCATION/TRAINING PROGRAM

## 2019-05-01 PROCEDURE — 84478 ASSAY OF TRIGLYCERIDES: CPT

## 2019-05-01 PROCEDURE — 94640 AIRWAY INHALATION TREATMENT: CPT

## 2019-05-01 PROCEDURE — 2060000000 HC ICU INTERMEDIATE R&B

## 2019-05-01 PROCEDURE — 97110 THERAPEUTIC EXERCISES: CPT

## 2019-05-01 PROCEDURE — 6360000002 HC RX W HCPCS: Performed by: STUDENT IN AN ORGANIZED HEALTH CARE EDUCATION/TRAINING PROGRAM

## 2019-05-01 RX ADMIN — Medication 10 ML: at 20:53

## 2019-05-01 RX ADMIN — FLUCONAZOLE 200 MG: 100 TABLET ORAL at 09:51

## 2019-05-01 RX ADMIN — LEVALBUTEROL 1.25 MG: 1.25 SOLUTION, CONCENTRATE RESPIRATORY (INHALATION) at 14:59

## 2019-05-01 RX ADMIN — I.V. FAT EMULSION 100 ML: 20 EMULSION INTRAVENOUS at 17:02

## 2019-05-01 RX ADMIN — VENLAFAXINE 37.5 MG: 37.5 TABLET ORAL at 16:04

## 2019-05-01 RX ADMIN — Medication 10 ML: at 09:54

## 2019-05-01 RX ADMIN — POTASSIUM CHLORIDE: 2 INJECTION, SOLUTION, CONCENTRATE INTRAVENOUS at 17:02

## 2019-05-01 RX ADMIN — METOPROLOL TARTRATE 12.5 MG: 25 TABLET ORAL at 09:53

## 2019-05-01 RX ADMIN — METOCLOPRAMIDE 10 MG: 5 INJECTION, SOLUTION INTRAMUSCULAR; INTRAVENOUS at 16:07

## 2019-05-01 RX ADMIN — VENLAFAXINE 37.5 MG: 37.5 TABLET ORAL at 20:53

## 2019-05-01 RX ADMIN — OXYCODONE AND ACETAMINOPHEN 1 TABLET: 5; 325 TABLET ORAL at 16:04

## 2019-05-01 RX ADMIN — CLOPIDOGREL BISULFATE 75 MG: 75 TABLET ORAL at 09:52

## 2019-05-01 RX ADMIN — Medication 2 PUFF: at 20:52

## 2019-05-01 RX ADMIN — ASPIRIN 81 MG: 81 TABLET, COATED ORAL at 09:53

## 2019-05-01 RX ADMIN — ENOXAPARIN SODIUM 40 MG: 100 INJECTION SUBCUTANEOUS at 09:51

## 2019-05-01 RX ADMIN — PREDNISONE 20 MG: 20 TABLET ORAL at 09:51

## 2019-05-01 RX ADMIN — METOCLOPRAMIDE 10 MG: 5 INJECTION, SOLUTION INTRAMUSCULAR; INTRAVENOUS at 03:10

## 2019-05-01 RX ADMIN — METOCLOPRAMIDE 10 MG: 5 INJECTION, SOLUTION INTRAMUSCULAR; INTRAVENOUS at 09:52

## 2019-05-01 RX ADMIN — METOPROLOL TARTRATE 12.5 MG: 25 TABLET ORAL at 20:52

## 2019-05-01 RX ADMIN — METOCLOPRAMIDE 10 MG: 5 INJECTION, SOLUTION INTRAMUSCULAR; INTRAVENOUS at 20:52

## 2019-05-01 RX ADMIN — IPRATROPIUM BROMIDE 0.5 MG: 0.5 SOLUTION RESPIRATORY (INHALATION) at 11:48

## 2019-05-01 RX ADMIN — VENLAFAXINE 37.5 MG: 37.5 TABLET ORAL at 09:54

## 2019-05-01 RX ADMIN — IPRATROPIUM BROMIDE 0.5 MG: 0.5 SOLUTION RESPIRATORY (INHALATION) at 14:59

## 2019-05-01 RX ADMIN — PANTOPRAZOLE SODIUM 40 MG: 40 TABLET, DELAYED RELEASE ORAL at 05:41

## 2019-05-01 RX ADMIN — FENTANYL CITRATE 50 MCG: 50 INJECTION INTRAMUSCULAR; INTRAVENOUS at 17:25

## 2019-05-01 RX ADMIN — LEVALBUTEROL 1.25 MG: 1.25 SOLUTION, CONCENTRATE RESPIRATORY (INHALATION) at 11:48

## 2019-05-01 ASSESSMENT — PAIN SCALES - WONG BAKER: WONGBAKER_NUMERICALRESPONSE: 4

## 2019-05-01 ASSESSMENT — PAIN DESCRIPTION - PAIN TYPE
TYPE: CHRONIC PAIN
TYPE: CHRONIC PAIN

## 2019-05-01 ASSESSMENT — PAIN SCALES - GENERAL
PAINLEVEL_OUTOF10: 0
PAINLEVEL_OUTOF10: 5
PAINLEVEL_OUTOF10: 8
PAINLEVEL_OUTOF10: 7

## 2019-05-01 ASSESSMENT — ENCOUNTER SYMPTOMS
NAUSEA: 0
VOMITING: 0
CONSTIPATION: 0
DIARRHEA: 1
SHORTNESS OF BREATH: 0
ABDOMINAL PAIN: 1

## 2019-05-01 ASSESSMENT — PAIN DESCRIPTION - DESCRIPTORS: DESCRIPTORS: ACHING

## 2019-05-01 ASSESSMENT — PAIN DESCRIPTION - LOCATION
LOCATION: BACK;GENERALIZED
LOCATION: BACK

## 2019-05-01 ASSESSMENT — PAIN DESCRIPTION - ORIENTATION: ORIENTATION: LEFT

## 2019-05-01 NOTE — PROGRESS NOTES
General Surgery Progress Note            PATIENT NAME: Loraine Carson     TODAY'S DATE: 5/1/2019, 8:53 AM      SUBJECTIVE / OBJECTIVE:      VITALS:  /66   Pulse 100   Temp 97.9 °F (36.6 °C) (Axillary)   Resp 20   Ht 5' (1.524 m)   Wt 132 lb 7.9 oz (60.1 kg)   SpO2 99%   BMI 25.88 kg/m²     Patient seen and examined  Awake. A&O X 3. Non-toxic appearing  RRR  LCTAB  Abdomen soft, ND. Tender diffusely to palpation  Cholecystostomy tube draining  Continue to have very poor PO intake. States she has no appetite.  On TPN  No peripheral edema     INTAKE/OUTPUT:      Intake/Output Summary (Last 24 hours) at 5/1/2019 0853  Last data filed at 5/1/2019 0456  Gross per 24 hour   Intake 550 ml   Output 500 ml   Net 50 ml       CBC with Differential:    Lab Results   Component Value Date    WBC 8.4 05/01/2019    RBC 2.74 05/01/2019    RBC 3.66 05/23/2012    HGB 8.2 05/01/2019    HCT 24.0 05/01/2019    PLT 92 05/01/2019     05/23/2012    MCV 87.8 05/01/2019    MCH 30.1 05/01/2019    MCHC 34.3 05/01/2019    RDW 15.7 05/01/2019    METASPCT 2 04/11/2019    LYMPHOPCT 7 05/01/2019    MONOPCT 3 05/01/2019    BASOPCT 0 05/01/2019    MONOSABS 0.20 05/01/2019    LYMPHSABS 0.60 05/01/2019    EOSABS 0.10 05/01/2019    BASOSABS 0.00 05/01/2019    DIFFTYPE NOT REPORTED 05/01/2019     CMP:    Lab Results   Component Value Date     05/01/2019     05/01/2019    K 4.1 05/01/2019    K 4.1 05/01/2019    CL 99 05/01/2019    CL 99 05/01/2019    CO2 24 05/01/2019    CO2 25 05/01/2019    BUN 18 05/01/2019    BUN 18 05/01/2019    CREATININE <0.40 05/01/2019    CREATININE <0.40 05/01/2019    GFRAA CANNOT BE CALCULATED 05/01/2019    GFRAA CANNOT BE CALCULATED 05/01/2019    LABGLOM CANNOT BE CALCULATED 05/01/2019    LABGLOM CANNOT BE CALCULATED 05/01/2019    GLUCOSE 125 05/01/2019    GLUCOSE 126 05/01/2019    GLUCOSE 87 05/21/2012    PROT 4.4 05/01/2019    LABALBU 1.7 05/01/2019    LABALBU 4.6 05/17/2012    CALCIUM 7.2 05/01/2019    CALCIUM 7.2 05/01/2019    BILITOT 0.32 05/01/2019    ALKPHOS 97 05/01/2019    AST 17 05/01/2019    ALT 13 05/01/2019         ASSESSMENT / PLAN:     Principal Problem:    Sepsis due to urinary tract infection ([address]  Active Problems:    Cystitis    Emphysematous cystitis    Septic shock (HCC)    Leukemoid reaction    Aspiration pneumonia of right lower lobe due to vomit (HCC)    MRSA carrier    Urinary retention    Abdominal distention    Elevated CEA    Elevated CA 19-9 level    Cholecystitis    CRP elevated    Elevated procalcitonin    Bandemia    Centrilobular emphysema (HCC)  Resolved Problems:    * No resolved hospital problems.  *    Chronic cholecystitis  Sepsis due to cystitis  S/P cholecystostomy tube placement 4/22  I feel she may be stable for lap ashley, but will need nutrition addresses

## 2019-05-01 NOTE — PROGRESS NOTES
Plan  Safety Devices  Safety Devices in place: Yes  Type of devices: Left in bed, Call light within reach, Patient at risk for falls  Plan  Times per week: 5  Times per day: Daily  Current Treatment Recommendations: ROM, Strengthening, Patient/Caregiver Education & Training, Self-Care / ADL, Safety Education & Training, Functional Mobility Training, Equipment Evaluation, Education, & procurement  Plan Comment: continue Ot      Goals  Short term goals  Time Frame for Short term goals: by discharge  Short term goal 1: pt will participate in 15-20 minutes bilateral UE therapeutic exercises to improve rt UE strength,increase lt UE (rom,coordination,decrease swelling)  Short term goal 2: pt will demonstrates increase indpendence w ADLS (needing min assist w grooming, UB bathing and dressing) using pacing/ECWS technique  Short term goal 3: Improve bed mobility for selfcare activities  Short term goal 4: Educate pt in ECWS techniques for selfcare  Short term goal 5: Assess safety w transfers  & progress to lower body ADLS when pt is appropriate    OT Individual Minutes  Time In: 1319  Time Out: [address] 73  Minutes: 31      Electronically signed by KORINA Rivera on 5/1/19 at 2:53 PM

## 2019-05-01 NOTE — FLOWSHEET NOTE
05/01/19 1233   Encounter Summary   Services provided to: Patient   Referral/Consult From: Patient   Support System Children   Continue Visiting   (5/1/19)   Complexity of Encounter Moderate   Length of Encounter 15 minutes   Spiritual Assessment Completed Yes   Spiritual/Gnosticism   Type Spiritual support   Assessment Coping;Concerns with suffering; Approachable; Hopeful   Intervention Active listening;Explored feelings, thoughts, concerns; Discussed meaning/purpose;Discussed relationship with God;Nurtured hope;Prayer   Outcome Expressed feelings/needs/concerns;Engaged in conversation;Coping;Expressed gratitude;Comfort

## 2019-05-01 NOTE — CARE COORDINATION
ONGOING DISCHARGE PLAN:    Spoke with patient regarding discharge plan and patient states, \"I know I need to go somewhere. Someone is  working on a place\". Writer mentioned [facility],  Patient states, \"yes\". Unfortunately, [facility] does not take TPN. LSW to do appeals process for LTACH     Remains on IV Lasix, IV Reglan, TPN and IV fluids. Will continue to follow for additional discharge needs.     Electronically signed by Denise Valle RN on 5/1/2019 at 3:08 PM

## 2019-05-01 NOTE — PROGRESS NOTES
Recent Labs     05/01/19  0458   *  131*   K 4.1  4.1   CL 99  99   CO2 24  25   BUN 18  18   CREATININE <0.40*  <0.40*   GLUCOSE 125*  126*   CALCIUM 7.2*  7.2*   MG 1.6   PHOS 3.2   TRIG 154*       CrCl cannot be calculated (This lab value cannot be used to calculate CrCl because it is not a number: <0.40). Wt Readings from Last 1 Encounters:   05/01/19 108 lb (49 kg)       Central line access Yes     Insulin sliding scale Yes     Lipid volume 100 ml     Lexus Beltran. Ph.  5/1/2019  2:59 PM

## 2019-05-01 NOTE — PROGRESS NOTES
General Surgery Progress Note            PATIENT NAME: Ashvin Ferreira     TODAY'S DATE: 4/30/2019, 10:23 PM      SUBJECTIVE / OBJECTIVE:      VITALS:  BP (!) 154/77   Pulse 104   Temp 97.5 °F (36.4 °C) (Oral)   Resp 18   Ht 5' (1.524 m)   Wt 132 lb 7.9 oz (60.1 kg)   SpO2 97%   BMI 25.88 kg/m²     Patient seen and examined  More awake  Continues to refuse diet, stating she has no appetite  PICC line placed for TPN  RRR  Lungs diminished  Abdomen soft, ND. Minimally tender  Cholecystostomy tube draining well  No peripheral edema    INTAKE/OUTPUT:      Intake/Output Summary (Last 24 hours) at 4/30/2019 2223  Last data filed at 4/30/2019 1814  Gross per 24 hour   Intake 3179.01 ml   Output 225 ml   Net 2954.01 ml       Labs reviewed      ASSESSMENT / PLAN:     Principal Problem:    Sepsis due to urinary tract infection ([address]  Active Problems:    Cystitis    Emphysematous cystitis    Septic shock (HCC)    Leukemoid reaction    Aspiration pneumonia of right lower lobe due to vomit (HCC)    MRSA carrier    Urinary retention    Abdominal distention    Elevated CEA    Elevated CA 19-9 level    Cholecystitis    CRP elevated    Elevated procalcitonin    Bandemia    Centrilobular emphysema (HCC)  Resolved Problems:    * No resolved hospital problems.  *    Sepsis  Acute on chronic cholecystitis  S/P cholecystostomy tube placement  May need PEG

## 2019-05-01 NOTE — PROGRESS NOTES
Nutrition Assessment (Parenteral Nutrition)    Type and Reason for Visit: Reassess    Nutrition Recommendations: Following changes made in additives: K Phos- 28 to 30 mmol, KCl- add 10 mEq, Mg- 17 to 19 mEq, add MVI & trace elements. Discontinue Ensure High Protein at pt's request, she will not take any type of supplement. Nutrition Assessment: Pt remains stable from a nutritional viewpoint with TPN and lipids providing all of her nutrition. PO intake remains very poor with pt complaining of no appetite and even the sight of food makes her nauseous. Have tried every supplement but again she will not drink it, question if pt will need TPN more longterm. Malnutrition Assessment:  · Malnutrition Status: At risk for malnutrition  · Context: Acute illness or injury  · Findings of the 6 clinical characteristics of malnutrition (Minimum of 2 out of 6 clinical characteristics is required to make the diagnosis of moderate or severe Protein Calorie Malnutrition based on AND/ASPEN Guidelines):  1. Energy Intake-Less than or equal to 75% of estimated energy requirement(Previously; improving with parenteral nutrition), Greater than or equal to 5 days    2. Weight Loss-Unable to assess(edema present),    3. Fat Loss-Unable to assess,    4. Muscle Loss-Unable to assess,    5. Fluid Accumulation-Mild fluid accumulation, Extremities  6.  Strength-Not measured    Nutrition Risk Level: High    Nutrient Needs:  · Estimated Daily Total Kcal: 4328-5995 based on wt of 25-27 per kg using wt of 57.2 kg  · Estimated Daily Protein (g): 63-68 based on 1.4-1.5 gm/kg IBW(revised)    Nutrition Diagnosis:   · Problem: Inadequate oral intake  · Etiology: related to Alteration in GI function, Insufficient energy/nutrient consumption     Signs and symptoms:  as evidenced by Nutrition support - PN, Intake 0-25%, GI abnormality    Objective Information:  · Nutrition-Focused Physical Findings: Loss of appetite.  Edema: +1 RUE, LUE, RLE, NATALIA.   · Wound Type: Stage I, Pressure Ulcer, Deep Tissue Injury, Multiple  · Current Nutrition Therapies:  · Oral Diet Orders: Low Fat   · Oral Diet intake: 0%  · Oral Nutrition Supplement (ONS) Orders: Low Calorie High Protein Supplement  · ONS intake: 0%  · Parenteral Nutrition Orders:  · Type and Formula: Premix Central, 2-in-1 Custom   · Lipids: 100ml, Daily  · Rate/Volume: 65 ml/ 1560 ml daily  · Duration: Continuous  · Current PN Order Provides: 1573 kcals, 78 gm of protein (lipids included)  · Goal PN Orders Provides: 6861-0944 kcal; 63-68 gm pro  · Additional Calories: None  · Anthropometric Measures:  · Ht: 5' (152.4 cm)   · Current Body Wt: 108 lb (49 kg)(PCT and writer re-weighed removing blankets etc)  · Admission Body Wt: 102 lb (46.3 kg)  · Ideal Body Wt: 100 lb (45.4 kg), % Ideal Body 108%  · BMI Classification: BMI 18.5 - 24.9 Normal Weight(Based on admission wt)    Nutrition Interventions:   Continue current diet, Discontinue ONS  Continued Inpatient Monitoring, Education Not Indicated    Nutrition Evaluation:   · Evaluation: Progressing toward goals   · Goals: PN to meet greater 90% of estimated nutrition needs   · Monitoring: Meal Intake, PN Intake, Diet Tolerance, Nausea or Vomiting, Weight, Pertinent Labs, Monitor Bowel Function, I&O, Skin Integrity      Love Ball R.D., L.JOSE.   Clinical Dietitian  Office: [phone]

## 2019-05-01 NOTE — PROGRESS NOTES
Pulmonary Progress Note  [facility]      Patient - Loraine Carson,  Age - 77 y.o.    - 1950      Room Number - 2123/2123-01   N -  [id number]   [facility]  Date of Admission -  2019  2:48 PM        Consulting Issa Giles MD  Primary Care Physician - Roxana Roper,      SUBJECTIVE   Complains of dyspnea at rest    OBJECTIVE   VITALS    height is 5' (1.524 m) and weight is 132 lb 7.9 oz (60.1 kg). Her axillary temperature is 97.9 °F (36.6 °C). Her blood pressure is 124/66 and her pulse is 100. Her respiration is 16 and oxygen saturation is 95%. Body mass index is 25.88 kg/m². Temperature Range: Temp: 97.9 °F (36.6 °C) Temp  Av.2 °F (36.8 °C)  Min: 97.5 °F (36.4 °C)  Max: 98.8 °F (37.1 °C)  BP Range:  Systolic (85WPE), SPS:232 , Min:124 , ZJH:692     Diastolic (47BES), HIS:75, Min:59, Max:77    Pulse Range: Pulse  Av.3  Min: 91  Max: 104  Respiration Range: Resp  Av.9  Min: 16  Max: 20  Current Pulse Ox[[id number]]  SpO2: 95 %  24HR Pulse Ox Range:  SpO2  Av.7 %  Min: 95 %  Max: 99 %  Oxygen Amount and Delivery: O2 Flow Rate (L/min): 1.5 L/min    Wt Readings from Last 3 Encounters:   19 132 lb 7.9 oz (60.1 kg)   19 89 lb (40.4 kg)   19 94 lb 1.6 oz (42.7 kg)       I/O (24 Hours)    Intake/Output Summary (Last 24 hours) at 2019 1254  Last data filed at 2019 0709  Gross per 24 hour   Intake 700 ml   Output 500 ml   Net 200 ml       EXAM     General Appearance  Awake, alert, oriented, in no acute distress, thin and frail  HEENT - normocephalic, atraumatic.  []  Mallampati  [] Crowded airway   [] Macroglossia  []  Retrognathia  [] Micrognathia  []  Normal tongue size []  Normal Bite  [] Salinas sign positive    Neck - Supple,  trachea midline   Lungs - diminished with poor air movement  Cardiovascular - Heart sounds are normal.  Regular rate and rhythm Abdomen - Soft, nontender, nondistended, no masses or organomegaly  Neurologic - There are no focal motor or sensory deficits  Skin - No bruising or bleeding  Extremities - No clubbing, cyanosis, edema    MEDS      [START ON 5/2/2019] enoxaparin  40 mg Subcutaneous Daily    mometasone-formoterol  2 puff Inhalation BID    pantoprazole  40 mg Oral QAM AC    metoprolol tartrate  12.5 mg Oral BID    fluconazole  200 mg Oral Daily    predniSONE  20 mg Oral Daily    metoclopramide  10 mg Intravenous Q6H    fat emulsion  100 mL Intravenous Daily    [Held by provider] furosemide  40 mg Intravenous Daily    magnesium sulfate  1 g Intravenous Once    ipratropium  0.5 mg Nebulization Q4H    insulin lispro  0-12 Units Subcutaneous Q6H    levalbuterol  1.25 mg Nebulization Q4H    venlafaxine  37.5 mg Oral TID    clopidogrel  75 mg Oral Daily    aspirin  81 mg Oral Daily    sodium chloride flush  10 mL Intravenous 2 times per day      PN-Adult 2-in-1 Central Line (Standard) 65 mL/hr at 04/30/19 1742    sodium chloride 10 mL/hr at 04/30/19 0029    dextrose       hydrOXYzine, metoprolol, sodium chloride nebulizer, fentanNYL, oxyCODONE-acetaminophen, magnesium sulfate, sodium phosphate IVPB **OR** sodium phosphate IVPB, potassium chloride **OR** potassium alternative oral replacement **OR** potassium chloride, glucose, dextrose, glucagon (rDNA), dextrose, milk and molasses, sodium chloride flush, acetaminophen    LABS   CBC   Recent Labs     05/01/19  0458   WBC 8.4   HGB 8.2*   HCT 24.0*   MCV 87.8   PLT 92*     BMP:   Lab Results   Component Value Date     05/01/2019     05/01/2019    K 4.1 05/01/2019    K 4.1 05/01/2019    CL 99 05/01/2019    CL 99 05/01/2019    CO2 24 05/01/2019    CO2 25 05/01/2019    BUN 18 05/01/2019    BUN 18 05/01/2019    LABALBU 1.7 05/01/2019    LABALBU 4.6 05/17/2012    CREATININE <0.40 05/01/2019    CREATININE <0.40 05/01/2019    CALCIUM 7.2 05/01/2019    CALCIUM 7.2

## 2019-05-01 NOTE — TELEPHONE ENCOUNTER
Per Dr. Jayden Forman instruction Hayde Haddad, patient's nurse at [facility] instructed to have cast removed.  Verbalizes understanding

## 2019-05-01 NOTE — PLAN OF CARE
Vss, assessments as charted. Fluids and meds given as ordered. Safety maintained.   Patient uses call light appropriately with no attempts to get out of bed without assistance

## 2019-05-01 NOTE — PROGRESS NOTES
[address] [facility].    PROGRESS NOTE             5/1/2019    10:08 PM    Name:   Leno Sullivan  MRN:     [id number]     Acct:      [de-identified]   Room:   [address] [place] [address]  IP Day:  21  Admit Date:  4/11/2019  2:48 PM    PCP:  Taylor Eubanks DO  Code Status:  Full Code    Subjective:     C/C:   Chief Complaint   Patient presents with    Abdominal Pain   *  Interval History Status: improved. Patient seen bedside. No acute events overnight. VSS. ESR elevated from 75 to 88 (max 105). Pro gerald trending down. Triglycerides 154. F/u CXR   Ok to dc from ID standpoint   Gen surg will consider lap ashley once nutritional status assessed     Brief History:     Per EMR:      The patient is a 68 y.o.   Female who presents withAbdominal Pain   and she is admitted to the hospital for the management of abdominal distension, nausea, vomiting, and acute cystitis.      Patient presents with chills, nausea, vomiting, abdominal pain (diffuse, but worse in the suprapubic region), abdominal distension, and dysuria of 2-3 days duration. Denies hematemesis. Acknowledges difficulty keeping food down but tolerating fluids. States abdominal pain is a 6/10 on average, and denies trying any medication to alleviate her sx. Review of Systems:     Review of Systems   Constitutional: Negative for chills, fatigue and fever. Eyes: Negative for visual disturbance. Respiratory: Negative for shortness of breath. Cardiovascular: Negative for chest pain, palpitations and leg swelling. Gastrointestinal: Positive for abdominal pain (suprapubic ) and diarrhea. Negative for constipation, nausea and vomiting. Genitourinary: Negative for dysuria, flank pain and pelvic pain. Musculoskeletal: Negative for myalgias. Skin: Negative for pallor. Neurological: Negative for weakness, light-headedness and headaches. Medications:      Allergies: Allergies   Allergen Reactions    Penicillins Shortness Of Breath and Rash    Amoxicillin        Current Meds:   Scheduled Meds:    [START ON 5/2/2019] enoxaparin  40 mg Subcutaneous Daily    mometasone-formoterol  2 puff Inhalation BID    pantoprazole  40 mg Oral QAM AC    metoprolol tartrate  12.5 mg Oral BID    fluconazole  200 mg Oral Daily    predniSONE  20 mg Oral Daily    metoclopramide  10 mg Intravenous Q6H    fat emulsion  100 mL Intravenous Daily    [Held by provider] furosemide  40 mg Intravenous Daily    magnesium sulfate  1 g Intravenous Once    ipratropium  0.5 mg Nebulization Q4H    insulin lispro  0-12 Units Subcutaneous Q6H    levalbuterol  1.25 mg Nebulization Q4H    venlafaxine  37.5 mg Oral TID    clopidogrel  75 mg Oral Daily    aspirin  81 mg Oral Daily    sodium chloride flush  10 mL Intravenous 2 times per day     Continuous Infusions:    PN-Adult 2-in-1 Central Line (Standard) 65 mL/hr at 05/01/19 1702    sodium chloride 10 mL/hr at 04/30/19 0029    dextrose       PRN Meds: hydrOXYzine, metoprolol, sodium chloride nebulizer, fentanNYL, oxyCODONE-acetaminophen, magnesium sulfate, sodium phosphate IVPB **OR** sodium phosphate IVPB, potassium chloride **OR** potassium alternative oral replacement **OR** potassium chloride, glucose, dextrose, glucagon (rDNA), dextrose, milk and molasses, sodium chloride flush, acetaminophen    Data:     Past Medical History:   has a past medical history of Abnormal computed tomography of cervical spine, CVA (cerebral vascular accident) ([address] GERD (gastroesophageal reflux disease), Hypertension, Paraproteinemia, and Weight loss. Social History:   reports that she has been smoking. She has a 30.00 pack-year smoking history. She has never used smokeless tobacco. She reports that she drank about 16.8 oz of alcohol per week. She reports that she does not use drugs. Family History: History reviewed.  No pertinent family history. Vitals:  /68   Pulse 83   Temp 97.4 °F (36.3 °C) (Oral)   Resp 17   Ht 5' (1.524 m)   Wt 108 lb (49 kg)   SpO2 99%   BMI 21.09 kg/m²   Temp (24hrs), Av.9 °F (36.6 °C), Min:97.3 °F (36.3 °C), Max:98.8 °F (37.1 °C)    Recent Labs     19  0540 19  1151 19  1612 19   POCGLU 124* 128* 137* 131*       I/O(24Hr): Intake/Output Summary (Last 24 hours) at 2019  Last data filed at 2019 1806  Gross per 24 hour   Intake 1072 ml   Output 950 ml   Net 122 ml       Labs: This SmartLink has not been configured with any valid records.     cbc  Lab Results   Component Value Date    WBC 8.4 2019    HGB 8.2 (L) 2019    HCT 24.0 (L) 2019    MCV 87.8 2019    PLT 92 (L) 2019     Lab Results   Component Value Date     (L) 2019     (L) 2019    K 4.1 2019    K 4.1 2019    CL 99 2019    CL 99 2019    CO2 24 2019    CO2 25 2019    BUN 18 2019    BUN 18 2019    CREATININE <0.40 (L) 2019    CREATININE <0.40 (L) 2019    GLUCOSE 125 (H) 2019    GLUCOSE 126 (H) 2019    CALCIUM 7.2 (L) 2019    CALCIUM 7.2 (L) 2019    PROT 4.4 (L) 2019    LABALBU 1.7 (L) 2019    BILITOT 0.32 2019    ALKPHOS 97 2019    AST 17 2019    ALT 13 2019    LABGLOM CANNOT BE CALCULATED 2019    LABGLOM CANNOT BE CALCULATED 2019    GFRAA CANNOT BE CALCULATED 2019    GFRAA CANNOT BE CALCULATED 2019    GLOB NOT REPORTED 2019           Lab Results   Component Value Date/Time    SPECIAL NOT REPORTED 2019 10:10 PM     Lab Results   Component Value Date/Time    CULTURE (A) 2019 04:59 PM     YEAST, NOT CANDIDA ALBICANS OR CANDIDA DUBLINIENSIS SCANT GROWTH    CULTURE (A) 2019 04:59 PM     KLEBSIELLA PNEUMONIAE ONE COLONY FORMING UNIT THIS ORGANISM IS AN EXTENDED-SPECTRUM BETA-LACTAMASE  AND RESISTANCE TO THERAPY WITH PENICILLINS, CEPHALOSPORINS AND AZTREONAM IS EXPECTED. THESE ORGANISMS GENERALLY REMAIN SUSCEPTIBLE TO CARBAPENEMS. CONSIDER ID CONSULTATION. CULTURE NO ANAEROBIC ORGANISMS ISOLATED AT 5 DAYS (A) 04/22/2019 04:59 PM           Radiology:    Ct Abdomen Pelvis Wo Contrast Additional Contrast? Oral    Result Date: 4/14/2019  EXAMINATION: CT OF THE ABDOMEN AND PELVIS WITHOUT CONTRAST 4/14/2019 7:34 pm TECHNIQUE: CT of the abdomen and pelvis was performed without the administration of intravenous contrast. Multiplanar reformatted images are provided for review. Dose modulation, iterative reconstruction, and/or weight based adjustment of the mA/kV was utilized to reduce the radiation dose to as low as reasonably achievable. COMPARISON: 04/11/2019 HISTORY: ORDERING SYSTEM PROVIDED HISTORY: ABDOMINAL PAIN TECHNOLOGIST PROVIDED HISTORY: Water soluble contrast only please Ordering Physician Provided Reason for Exam: Abdominal pain - Vented patient. Contrast given via nurse through NG tube. Acuity: Unknown Type of Exam: Unknown Relevant Medical/Surgical History: Hx - Sepsis due to urinary tract infection. FINDINGS: Lower Chest: New moderate layering bilateral pleural effusions with bilateral lower lobe atelectasis. Organs: Limited evaluation due lack of intravenous contrast.  Cholelithiasis redemonstrated. No gallbladder wall thickening or biliary ductal dilatation. Scattered tiny hypodense lesions in the liver are too small to characterize but statistically represent benign cysts or hemangiomas and appear unchanged. The pancreas, spleen, adrenal glands, and kidneys are unremarkable. There is no hydronephrosis or urinary tract calculus. GI/Bowel: The stomach is distended. Enteric tube is in place. No contrast is seen distal to the pylorus and there is contrast reflux into the distal esophagus. There is no evidence of bowel obstruction. The appendix is not definitely visualized. No focal pericecal inflammatory changes are evident. Pelvis: The urinary bladder is decompressed by Tran catheter. No pelvic mass is seen. Peritoneum/Retroperitoneum: Small amount of free fluid in the pelvic cavity. No free air or focal fluid collection. No abnormal lymph node. Normal abdominal aortic caliber. Moderate calcific atherosclerosis. Bones/Soft Tissues: No acute osseous abnormality. Diffuse anasarca. Moderate degenerative changes in the lumbar spine. 1. Distended, contrast filled stomach with reflux of contrast into the esophagus. No enteric contrast is seen distal to the pylorus suggesting delayed gastric emptying in the setting of ileus. 2. New moderate layering bilateral pleural effusions with bilateral lower lobe atelectasis. 3. Small amount of nonspecific free fluid in the pelvic cavity. 4. Anasarca. 5. Cholelithiasis. Xr Chest (single View Frontal)    Result Date: 4/13/2019  EXAMINATION: SINGLE XRAY VIEW OF THE CHEST 4/13/2019 7:18 am COMPARISON: April 12, 2019 HISTORY: ORDERING SYSTEM PROVIDED HISTORY: dyspnea TECHNOLOGIST PROVIDED HISTORY: dyspnea Ordering Physician Provided Reason for Exam: dyspnea Acuity: Acute Type of Exam: Subsequent/Follow-up Additional signs and symptoms: dyspnea FINDINGS: A right IJ catheter is seen with its tip terminating at the superior cavoatrial junction. The left chest wall pacemaker and leads are stable. The cardiomediastinal silhouette is stable. There is interval increased opacity at the right lung base, may be related to atelectasis versus pneumonia. There is small atelectasis and mild pleural effusion at the left lung base. There is no pneumothorax. There is no acute osseous abnormality. Interval increased opacity at the right lung base, may be related to atelectasis versus pneumonia. Small atelectasis and mild pleural effusion at the left lung, new since the prior study.      Xr Chest Standard (2 Vw)    Result Date: 4/29/2019  EXAMINATION: TWO VIEWS OF THE CHEST 4/29/2019 8:04 pm COMPARISON: 04/27/2019 HISTORY: ORDERING SYSTEM PROVIDED HISTORY: Follow-up lung infiltrate TECHNOLOGIST PROVIDED HISTORY: Follow-up lung infiltrate Ordering Physician Provided Reason for Exam: Follow-up infiltrate. Pt unable to lean forward - unable to get proper sponge behind pt for lateral. Pt unable to raise left arm at all for lateral. Best possible lateral obtained. Acuity: Unknown Type of Exam: Unknown Additional signs and symptoms: Follow-up infiltrate. Pt unable to lean forward - unable to get proper sponge behind pt for lateral. Pt unable to raise left arm at all for lateral. Best possible lateral obtained. FINDINGS: Left chest cardiac pacemaker device is in place. Right IJ central venous catheter in place with distal tip at the cavoatrial junction. Heart size is within normal limits. No vascular congestion. There are small to moderate pleural effusions. There are interstitial opacities in the upper lungs, which could be related to scarring and/or developing infiltrate, slightly improved in appearance when compared to 04/27/2019. No evidence of pneumothorax. 1.  Small to moderate bilateral pleural effusions, better visualized on lateral view. 2.  Upper lobe interstitial opacities, slightly improved when compared to the previous study, possibly related to underlying fibrotic change or residual infiltrate. Xr Abdomen (kub) (single Ap View)    Result Date: 4/19/2019  EXAMINATION: SINGLE SUPINE XRAY VIEW(S) OF THE ABDOMEN 4/19/2019 9:48 am COMPARISON: April 14, 2019 HISTORY: ORDERING SYSTEM PROVIDED HISTORY: pain TECHNOLOGIST PROVIDED HISTORY: pain Ordering Physician Provided Reason for Exam: Abdominal pain and distentsion Acuity: Acute Type of Exam: Initial Additional signs and symptoms: Abdominal pain and distentsion FINDINGS: Enteric tube with the tip within the stomach. Small left pleural effusion.   Minimal right pleural effusion. Mildly dilated loops of bowel. Nonspecific bowel gas pattern with mildly dilated loops of bowel. Xr Abdomen (kub) (single Ap View)    Result Date: 4/14/2019  EXAMINATION: SINGLE SUPINE XRAY VIEW(S) OF THE ABDOMEN 4/14/2019 7:39 am COMPARISON: CT abdomen and pelvis  film from 11 April 2019 HISTORY: [address]: Abd Distention TECHNOLOGIST PROVIDED HISTORY: Abd Distention Ordering Physician Provided Reason for Exam: Abdominal distention. Pt was moving around in the bed. Best films at present time. Acuity: Acute Type of Exam: Initial Additional signs and symptoms: Abdominal distention. Pt was moving around in the bed. Best films at present time. FINDINGS: Portable view time stamped at 748 hours demonstrates an intestinal tube terminating in the midportion of a gaseous Spike dilated stomach. Densities are present over the stomach likely medication. Bipolar pacemaker is in situ with intact leads. Heart size is top-normal, stable. Gaseous distension of the stomach and loop of bowel in the upper mid abdomen is noted but there is gas and fecal material in the rectum. Gastric outlet obstruction or proximal small bowel partial obstruction is suspected. No free air is noted. Midline city is present over the pelvis likely a monitor or CT small bore catheter. Vascular calcification is present in the pelvis. Persistent preferential gaseous distension of the stomach although there is some gas in the upper abdomen and gas and fecal material present in the rectosigmoid. Findings suggest gastric outlet obstruction. Ct Abdomen Pelvis W Iv Contrast    Result Date: 4/11/2019  EXAMINATION: CT OF THE ABDOMEN AND PELVIS WITH CONTRAST 4/11/2019 5:14 pm TECHNIQUE: CT of the abdomen and pelvis was performed with the administration of intravenous contrast. Multiplanar reformatted images are provided for review.  Dose modulation, iterative reconstruction, and/or weight based adjustment of the mA/kV was utilized to reduce the radiation dose to as low as reasonably achievable. COMPARISON: None. HISTORY: ORDERING SYSTEM PROVIDED HISTORY: Abdominal pain TECHNOLOGIST PROVIDED HISTORY: IV Only Contrast Ordering Physician Provided Reason for Exam: patient c/o abd pain for an hour FINDINGS: Lower Chest: Trace pleural fluid bilaterally is noted. Indwelling cardiac pacemaker is present. Heart size is normal.  Coronary artery calcifications are evident. The esophagus is significantly dilated and fluid-filled. No paraesophageal adenopathy is evident. Organs: The spleen, pancreas, and adrenals are unremarkable. The liver contains hypodensities, likely cysts. The gallbladder is distended and contains a solitary gallstone. The kidneys excrete contrast bilaterally. Extrarenal collecting systems are noted. The ureters are mildly dilated down to the urinary bladder without evidence of intraluminal or ureteral obstructing calculi. GI/Bowel: Marked distention of the stomach is noted; this continues to the pylorus with appearance suggesting partial gastric outlet obstruction. Fluid is present in some small bowel loops and colon distal to the stomach. No small bowel obstruction. Pelvis: Marked distention of the urinary bladder is noted. 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 Distended ureters may be related to reflux or infection. No free pelvic fluid, pelvic or inguinal adenopathy is noted. Peritoneum/Retroperitoneum: No aortic aneurysm. Mild to moderate plaque is noted in the infrarenal abdominal aorta and both proximal iliac arteries. Shotty lymph nodes are present around the aorta in the upper abdomen. No mesenteric adenopathy is noted. Bones/Soft Tissues: Degenerative changes are present in the hips and lower lumbar facets. Estimated biologic radiation dose for this procedure:258.77 mGy/cm2.     1. Dilatation of the thoracic esophagus filled with fluid.  No obstructive process is noted. No adjacent enlarged lymph nodes. 2. Trace pleural fluid. 3. Marked distention of the stomach. This appears to extend to the pylorus. Partial gastric outlet obstruction is not excluded. 4. Bilateral dilated ureters without obstructing calculi. Urinary bladder is markedly distended with bladder wall air suggesting emphysematous cystitis. Dilatation of the ureters may be related to reflux or infection. 5. Atherosclerotic disease. 6. Other findings as above. Critical results were called by Dr. Ivory Jay MD to [address] on 4/11/2019 at 17:37. Ir Sylwia Flores Device Plmt/replace/removal    Result Date: 4/30/2019  PROCEDURE: ULTRASOUND GUIDED VASCULAR ACCESS. FLUOROSCOPY GUIDED PICC PLACEMENT 4/30/2019. HISTORY: ORDERING SYSTEM PROVIDED HISTORY: TPN TECHNOLOGIST PROVIDED HISTORY: TPN Lumen?->Double Lumen SEDATION: None FLUOROSCOPY DOSE AND TYPE OR TIME AND EXPOSURES: 7 seconds; D  TECHNIQUE: This procedure was performed by Dr. Natalya Springer. Informed consent was obtained after a detailed explanation of the procedure including risks, benefits, and alternatives. Universal protocol was observed. The right arm was prepped and draped in sterile fashion using maximum sterile barrier technique. Local anesthesia was achieved with lidocaine. A micropuncture needle was used to access the right basilic vein using ultrasound guidance. An ultrasound image demonstrating patency of the vein with needle tip located within it. An image was obtained and stored in PACs. A 0.018 guidewire was used to place a peel-a-way sheath and a 5 Kisw[place]ili dual-lumen PICC was advanced with fluoroscopic guidance with the tip at the cavo-atrial junction. The catheter flushed easily and there was a good blood return. The catheter was secured to the skin. The patient tolerated the procedure well and there were no immediate complications.  FINDINGS: Fluoroscopic image demonstrates the tip of the catheter at the cavo-atrial junction. Successful ultrasound and fluoroscopy guided PICC placement     Us Gallbladder Ruq    Result Date: 4/19/2019  EXAMINATION: RIGHT UPPER QUADRANT ULTRASOUND 4/19/2019 10:48 am COMPARISON: CT abdomen and pelvis 4/14/2018 HISTORY: ORDERING SYSTEM PROVIDED HISTORY: ABDOMINAL PAIN FINDINGS: Pancreas is not well visualized. No liver lesion. Gallbladder wall thickening. Gallbladder sludge. Cholelithiasis. Pericholecystic fluid. Common bile duct measures at the upper limits of normal at 7 mm. Findings suggesting acute cholecystitis. Xr Chest Portable    Result Date: 4/27/2019  EXAMINATION: SINGLE XRAY VIEW OF THE CHEST 4/27/2019 8:47 am COMPARISON: 04/26/2019 HISTORY: ORDERING SYSTEM PROVIDED HISTORY: Assess for pulm edema vs PNA TECHNOLOGIST PROVIDED HISTORY: Assess for pulm edema vs PNA Ordering Physician Provided Reason for Exam: cough, congestion Acuity: Acute Type of Exam: Initial FINDINGS: Right IJ central venous catheter is in place tip in the SVC right atrial junction. Pacer wires are in place. The heart and mediastinal structures are stable. Pleural effusions are present with bibasilar atelectasis. Bilateral lung infiltrates are present in the upper lung fields. Persistent pleural effusions with bibasilar atelectasis and bilateral lung infiltrates with areas of consolidation developing in the upper lobes. Xr Chest Portable    Result Date: 4/26/2019  EXAMINATION: SINGLE XRAY VIEW OF THE CHEST 4/26/2019 6:51 am COMPARISON: April 24, 2019 HISTORY: ORDERING SYSTEM PROVIDED HISTORY: Pleural effusions TECHNOLOGIST PROVIDED HISTORY: Pleural effusions Ordering Physician Provided Reason for Exam: pleural effusion Acuity: Acute Type of Exam: Initial FINDINGS: Right IJ line in good position. Pacer device is stable. Cardiac leads overlie the chest.  Stable cardiomediastinal contours with calcified aortic arch. Left effusion and associated airspace disease, slightly decreased. Chronic blunting of right costophrenic sulcus may represent scarring or chronic effusion. Increased reticular markings right upper chest and left upper chest possibly interstitial edema or interstitial pneumonia. No new airspace consolidation. Increasing reticular markings upper chest bilaterally right greater than left possibly due to edema or interstitial pneumonitis. Improving left effusion with associated retrocardiac airspace disease. Pleural-parenchymal scarring or effusion in the right lung base, stable. Xr Chest Portable    Result Date: 4/24/2019  EXAMINATION: SINGLE XRAY VIEW OF THE CHEST 4/24/2019 6:05 am COMPARISON: Chest radiograph dated 04/22/2019 HISTORY: ORDERING SYSTEM PROVIDED HISTORY: Acute respiratory failure, pleural effusion TECHNOLOGIST PROVIDED HISTORY: Acute respiratory failure, pleural effusion Ordering Physician Provided Reason for Exam: pleural effusion, respiratory failure. Acuity: Acute Type of Exam: Initial FINDINGS: Heart size is normal.  Dual-chamber pacemaker placed via left subclavian approach. Right internal jugular central line has tip in distal superior vena cava. Interval removal of nasogastric tube. No interval change of infiltrate and atelectasis at the left lung base. Stable mild infiltrate in the left upper lobe. Mild interval improvement of infiltrate at the right lung base. Stable small bilateral pleural effusions, left larger than right. Mild CHF, stable. 1.  No interval change of infiltrate and atelectasis at the left lung base. Stable mild infiltrate in the left upper lobe. 2.  Mild interval improvement of infiltrate at the right lung base. 3.  Stable small bilateral pleural effusions, left larger than right. 4.  Mild CHF, stable. Xr Chest Portable    Result Date: 4/22/2019  EXAMINATION: SINGLE XRAY VIEW OF THE CHEST 4/22/2019 6:44 am COMPARISON: April 21, 2019.  HISTORY: ORDERING SYSTEM PROVIDED HISTORY: Hypoxia and CHF TECHNOLOGIST PROVIDED HISTORY: Hypoxia and CHF Ordering Physician Provided Reason for Exam: hx of hypoxia/CHF Acuity: Acute Type of Exam: Initial FINDINGS: Right IJ central venous catheter appears in unchanged position. Nasogastric tube courses below the diaphragm, with tip not imaged. Left chest wall pacemaker device projects in unchanged position. Cardiac and mediastinal contours are enlarged but unchanged. Unchanged bilateral pleural effusions and bibasilar pulmonary opacities. Unchanged findings suggestive of congestive heart failure. No evidence of pneumothorax. No new osseous abnormalities. 1. No significant change in findings suggestive of congestive heart failure. 2. Unchanged bilateral pleural effusions and bibasilar pulmonary consolidations. RECOMMENDATION: Radiographic follow-up to complete resolution. Xr Chest Portable    Result Date: 4/21/2019  EXAMINATION: SINGLE XRAY VIEW OF THE CHEST 4/21/2019 3:08 pm COMPARISON: April 19, 2019 HISTORY: ORDERING SYSTEM PROVIDED HISTORY: hypoxia TECHNOLOGIST PROVIDED HISTORY: hypoxia Ordering Physician Provided Reason for Exam: hypoxia Acuity: Unknown Type of Exam: Unknown FINDINGS: Enteric tube in the stomach. ET tube is been removed. Right IJ catheter terminates in the atrial caval junction. Bipolar pacer on the left unchanged. Heart and mediastinum unremarkable. Moderate edema unchanged. Small effusions, left greater than right. Bony thorax intact. Status post extubation. Life support apparatus otherwise stable. Moderate edema and effusions unchanged.      Xr Chest Portable    Result Date: 4/19/2019  EXAMINATION: SINGLE XRAY VIEW OF THE CHEST 4/19/2019 5:51 am COMPARISON: April 18, 2019 HISTORY: ORDERING SYSTEM PROVIDED HISTORY: ETT placement TECHNOLOGIST PROVIDED HISTORY: ETT placement Ordering Physician Provided Reason for Exam: Vent Pt check ETT placement Acuity: Acute Type of Exam: Subsequent/Follow-up Additional signs and symptoms: Vent Pt check ETT placement FINDINGS: Tubes and lines are unchanged. Persistent bibasilar lung opacities and pleural effusions. Mild pulmonary edema. No significant interval change. Xr Chest Portable    Result Date: 4/18/2019  EXAMINATION: SINGLE XRAY VIEW OF THE CHEST 4/18/2019 6:21 am COMPARISON: April 17, 2019 HISTORY: ORDERING SYSTEM PROVIDED HISTORY: ETT placement TECHNOLOGIST PROVIDED HISTORY: ETT placement Ordering Physician Provided Reason for Exam: on vent Acuity: Acute Type of Exam: Initial FINDINGS: Right IJ line and NG tube are stable. Interval advancement of ET tube terminating 3.1 cm from ron. Implanted cardiac device is present. Left-sided effusion and associated airspace disease is stable. Hazy right basilar opacity possibly edema or atelectasis. No pneumothorax. Increased opacity left upper chest possibly focal area of atelectasis, new from prior. Advanced ETT from prior exam, now 3.1 cm from ron. Increased opacity left upper chest suspicious for atelectasis. Additional supporting devices are stable. Xr Chest Portable    Result Date: 4/17/2019  EXAMINATION: SINGLE XRAY VIEW OF THE CHEST 4/17/2019 7:15 am COMPARISON: 16 April 2019 HISTORY: ORDERING SYSTEM PROVIDED HISTORY: ETT placement TECHNOLOGIST PROVIDED HISTORY: ETT placement Ordering Physician Provided Reason for Exam: on vent Acuity: Acute Type of Exam: Initial FINDINGS: AP portable view of the chest time stamped at 613 hours demonstrates overlying cardiac monitoring electrodes. A right internal jugular catheter terminates in the superior vena cava. Endotracheal tube is somewhat high riding terminating 6.4 cm above the ron. Intestinal tube extends beyond the body of the stomach, tip not included on the exam.  Bipolar pacemaker enters from the left with intact leads in appropriate positions.  Heart size is normal.  Left pleural effusion is noted there is continued volume loss in the left hemithorax with stable mild vascular IJ approach central venous catheter unchanged in position. Heart size not substantially changed. Perihilar and basilar opacities further increased. Left pleural effusion increased in size. Findings may reflect pulmonary edema, progressed from yesterday's exam.  Left pleural effusion increased in size. Xr Chest Portable    Result Date: 4/12/2019  EXAMINATION: SINGLE XRAY VIEW OF THE CHEST 4/12/2019 5:26 am COMPARISON: November 14, 2015. HISTORY: ORDERING SYSTEM PROVIDED HISTORY: line placement TECHNOLOGIST PROVIDED HISTORY: line placement Ordering Physician Provided Reason for Exam: New right side line placement. Acuity: Acute Type of Exam: Initial Additional signs and symptoms: New right side line placement. FINDINGS: Stable left pectoral trans venous cardiac pacer device. New right IJ central venous catheter with tip near the superior atrial caval junction. Normal lung volume. No new consolidation. Curvilinear radiopacity projecting over the right upper lobe likely represents artifact from a skin fold. No pleural effusion or pneumothorax. Stable cardiomediastinal silhouette and great vessels with redemonstration of atherosclerotic thoracic aorta. New right IJ central venous catheter with tip near the superior atrial caval junction. No pneumothorax. No new consolidation.      Vl Upper Extremity Bilateral Venous Duplex    Result Date: 4/22/2019    [facility]  Vascular Upper Extremities Veins Procedure   Patient Name   Kash Ayers     Date of Study           04/22/2019                 Theodis Bloch   Date of Birth  1950  Gender                  Female   Age            77 year(s)  Race                       Room Number    2002        Height:                 59.84 inch, 152 cm   Corporate ID # [id number]    Weight:                 102 pounds, 46.3 kg   Patient Acct # [de-identified]   BSA:        1.4 m^2     BMI:       20.03 kg/m^2   MR #           [id number]      Sonographer Roderick Mario   Accession #    [id number]   Interpreting Physician  Elenita Mckeon   Referring                  Referring Physician     Mikey Chavez *  Nurse  Practitioner  Procedure Type of Study:   Veins: Upper Extremities Veins, Venous Scan Upper Bilateral.  Indications for Study:Swelling. Patient Status: In Patient. Technical Quality:Limited visualization. Limitation reason:Line placements. Conclusions   Summary   No evidence of superficial or deep venous thrombosis in both upper  extremities. Signature   ----------------------------------------------------------------  Electronically signed by Roderick Mario(Sonographer) on  04/22/2019 11:46 AM  ----------------------------------------------------------------   ----------------------------------------------------------------  Electronically signed by Octavia Oliveira(Interpreting  physician) on 04/22/2019 09:31 PM  ----------------------------------------------------------------  Findings:   Right Impression:                  Left Impression:  Internal jugular vein not          The internal jugular, subclavian,  visualized due to line placement. axillary, brachial, radial, and ulnar                                     veins demonstrate normal  Subclavian, axillary, brachial,    compressibility with phasic Doppler  radial, and ulnar veins            signals. demonstrate normal compressibility  with phasic Doppler signals. Normal compressibility of the cephalic                                     vein. Normal compressibility of the  cephalic vein. Normal compressibility of the basilic                                     vein. Normal compressibility of the  basilic vein. Velocities are measured in cm/s ; Diameters are measured in cm Right UE Vein Measurements 2D Measurements +------------------------------------+----------+---------------+----------+ ! Location                            ! Visualized! Compressibility! Thrombosis! !Yes       !Yes            ! None      ! +------------------------------------+----------+---------------+----------+ ! Cephalic at UA                      ! Yes       ! Yes            ! None      ! +------------------------------------+----------+---------------+----------+ ! Cephalic at AF                      ! Yes       ! Yes            ! None      ! +------------------------------------+----------+---------------+----------+ ! Cephalic at [address]                      ! Yes       ! Yes            ! None      ! +------------------------------------+----------+---------------+----------+ Doppler Measurements +-------------------------+-----------------------+------------------------+ ! Location                 ! Signal                 !Reflux                  ! +-------------------------+-----------------------+------------------------+ ! SCV                      ! Phasic                 ! No                      ! +-------------------------+-----------------------+------------------------+ ! Axillary                 ! Phasic                 ! No                      ! +-------------------------+-----------------------+------------------------+ ! Brachial                 !Phasic                 ! No                      ! +-------------------------+-----------------------+------------------------+ Left UE Vein Measurements 2D Measurements +------------------------------------+----------+---------------+----------+ ! Location                            ! Visualized! Compressibility! Thrombosis! +------------------------------------+----------+---------------+----------+ ! Prox IJV                            ! Yes       ! Yes            ! None      ! +------------------------------------+----------+---------------+----------+ ! Dist IJV                            ! Yes       ! Yes            ! None      ! +------------------------------------+----------+---------------+----------+ ! Prox SCV                            ! Yes       ! Yes !None      ! +------------------------------------+----------+---------------+----------+ ! Dist SCV                            ! Yes       ! Yes            ! None      ! +------------------------------------+----------+---------------+----------+ ! Prox Axillary                       ! Yes       ! Yes            ! None      ! +------------------------------------+----------+---------------+----------+ ! Dist Axillary                       ! Yes       ! Yes            ! None      ! +------------------------------------+----------+---------------+----------+ ! Prox Brachial                       !Yes       ! Yes            ! None      ! +------------------------------------+----------+---------------+----------+ ! Dist Brachial                       !Yes       ! Yes            ! None      ! +------------------------------------+----------+---------------+----------+ ! Prox Radial                         !Yes       ! Yes            ! None      ! +------------------------------------+----------+---------------+----------+ ! Dist Radial                         !Yes       ! Yes            ! None      ! +------------------------------------+----------+---------------+----------+ ! Prox Ulnar                          ! Yes       ! Yes            ! None      ! +------------------------------------+----------+---------------+----------+ ! Dist Ulnar                          ! Yes       ! Yes            ! None      ! +------------------------------------+----------+---------------+----------+ ! Basilic at UA                       ! Yes       ! Yes            ! None      ! +------------------------------------+----------+---------------+----------+ ! Cephalic at UA                      ! Yes       ! Yes            ! None      ! +------------------------------------+----------+---------------+----------+ ! Gideon at AF                      ! Yes       ! Yes            ! None      ! +------------------------------------+----------+---------------+----------+ Doppler Measurements +-------------------------+-----------------------+------------------------+ ! Location                 ! Signal                 !Reflux                  ! +-------------------------+-----------------------+------------------------+ ! IJV                      ! Phasic                 !                        ! +-------------------------+-----------------------+------------------------+ ! SCV                      ! Phasic                 !                        ! +-------------------------+-----------------------+------------------------+ ! Axillary                 ! Phasic                 !                        ! +-------------------------+-----------------------+------------------------+ ! Brachial                 !Phasic                 !                        ! +-------------------------+-----------------------+------------------------+    Ir Cholecystostomy Percutaneous Complete    Result Date: 4/22/2019  PROCEDURE: ULTRASOUND and fluoroscopic GUIDED CHOLECYSTOSTOMY TUBE PLACEMENT April 22, 2019 HISTORY: ORDERING SYSTEM PROVIDED HISTORY: acute cholecystitis TECHNOLOGIST PROVIDED HISTORY: acute cholecystitis SEDATION: 75 mcg IV fentanyl for pain TECHNIQUE: Informed consent was obtained after a detailed explanation of the procedure including risks, benefits, and alternatives. Universal protocol was followed. A suitable skin site was prepped and draped in sterile fashion following ultrasound localization. An 18 gauge needle was advanced under ultrasound guidance into the gallbladder via transhepatic route and a 0.035 guidewire was used to place a 8 [place] cholecystostomy tube under fluoroscopic guidance. The catheter was sutured to the skin and the patient tolerated the procedure well. Thick bilious material was sent for laboratory analysis. The catheter was attached to gravity drainage. FINDINGS: Ultrasound image demonstrates distended gallbladder. Bilious fluid was sent for culture.      Successful placement of transhepatic 8 German percutaneous cholecystostomy tube. Nm Hepatobiliary Scan W Ejection Fraction    Result Date: 4/20/2019  EXAMINATION: NUCLEAR MEDICINE HEPATOBILIARY SCINTIGRAPHY (HIDA SCAN). 4/20/2019 2:15 pm TECHNIQUE: Approximately 5.6 [id number] Mebrofenin (Choletec) was administered IV. Then, dynamic images of the abdomen were obtained in the anterior projection for 60 mins. A right lateral view was also obtained at 60 mins. Delayed images up to 4 hours were obtained. COMPARISON: Ultrasound 04/19/2019 HISTORY: ORDERING SYSTEM PROVIDED HISTORY: CHOLECYSTITIS TECHNOLOGIST PROVIDED HISTORY: Ordering Physician Provided Reason for Exam: Cholecystitis Acuity: Unknown Type of Exam: Unknown FINDINGS: Prompt, homogenous uptake by the liver is noted with normal appearance of radiotracer excretion into the biliary system. Clearance of bloodpool activity appears appropriate. Normal small bowel activity is seen. Prominent activity within the common bile duct. The gallbladder is not visualized by the end of the exam.     Absent filling of the gallbladder consistent with acute cholecystitis. Physical Examination:        Physical Exam   Constitutional: She is oriented to person, place, and time. She appears well-developed. Pt in mild distress   HENT:   Mouth/Throat: Oropharynx is clear and moist.   Eyes: Conjunctivae are normal.   Neck: Neck supple. Cardiovascular: Normal rate, regular rhythm and normal heart sounds. Pulmonary/Chest: Effort normal and breath sounds normal.   Abdominal: Soft. Bowel sounds are normal. There is tenderness (palpation of suprapubic area ). Musculoskeletal: She exhibits tenderness (palpation of left leg ). She exhibits no edema. Neurological: She is alert and oriented to person, place, and time. Decreased motor function left sided body - chronic    Skin: Skin is warm and dry. Nursing note and vitals reviewed.         Assessment:        Primary sliding scale   4.1     Hypophosphatemia   Sodium phosphate prn    Left knee remote distal tibia fx w/ osteopenia   Ortho replaced brace    Maintenance              Lovenox              Dulera, Xopenex, Symbicort, Atrovent              Continue home meds trazodone, ASA, Plavix, Norvasc, Effexor, Spiriva    Dispo  PT/OT eval and treat   Social work dc planning       Stephen Briscoe MD  5/1/2019  10:08 PM   Attending Physician Statement    I have discussed the case of Mo Gonsalez, including pertinent history and exam findings with the resident. I have seen and examined the patient and the key elements of the encounter have been performed by me. I agree with the assessment, plan, and orders as documented by the resident.   See my note from this morning   Electronically signed by Stephen Briscoe MD on 5/1/2019 at 10:09 PM

## 2019-05-01 NOTE — PLAN OF CARE
Problem: Falls - Risk of:  Goal: Will remain free from falls  Description  Will remain free from falls  5/1/2019 0349 by Davin Hopper RN  Outcome: Met This Shift     Problem: Falls - Risk of:  Goal: Absence of physical injury  Description  Absence of physical injury  5/1/2019 0349 by Davin Hopper RN  Outcome: Met This Shift     Problem: Risk for Impaired Skin Integrity  Goal: Tissue integrity - skin and mucous membranes  Description  Structural intactness and normal physiological function of skin and  mucous membranes.   5/1/2019 0349 by Davin Hopper RN  Outcome: Ongoing     Problem: Infection, Septic Shock:  Goal: Will show no infection signs and symptoms  Description  Will show no infection signs and symptoms  5/1/2019 0349 by Davin Hopper RN  Outcome: Ongoing     Problem: Pain:  Goal: Pain level will decrease  Description  Pain level will decrease  5/1/2019 0349 by Davin Hopper RN  Outcome: Ongoing     Problem: Pain:  Goal: Control of acute pain  Description  Control of acute pain  5/1/2019 0349 by Davin Hopper RN  Outcome: Ongoing     Problem: Pain:  Goal: Control of acute pain  Description  Control of acute pain  5/1/2019 0349 by Davin Hopper RN  Outcome: Ongoing     Problem: Nutrition  Goal: Optimal nutrition therapy  Description       5/1/2019 0349 by Davin Hopper RN  Outcome: Ongoing

## 2019-05-01 NOTE — PROGRESS NOTES
Infectious disease Consult Note      Patient: Erin Damon  : 1950  Acct#:  [id number]     Date:  2019    Assessment:     Cholecystitis status post cholecystectomy tube  grew Candida non-albicans and one colony of ESBL Klebsiella on culture . Clinically improving     Ecoli Emphysematous cystitis treated     Aspiration pneumonia of right lower lobe treated    Sepsis /Septic shock     Yeast growth on sputum culture suspect contamination     Leukocytosis resolved    Acute hypoxic resp failure resolved    MRSA carrier    Urinary retention     Anemia      PCN allergy                 Recommendations:     PO Diflucan until 5 /3  Follow CBC , renal function closely . Ok to discharge from ID [facility] . Subjective:       History of Present Illness  Patient is a 77 y.o.  female admitted with Sepsis due to urinary tract infection   who is seen in consult for the same . She presented w dysuria and lower abd pain for around a week associated with vomiting ,was found hypotensive with leukocytosis . CT suggested emphysematous cystitis,possible gastric outlet obstruction. CXR showed a right lower infiltrate. High CA-19-9,CEA  Allergy to [facility] w SOB   Urine culture  grew ESCHERICHIA COLI resistant to Ampicillin ,sensitive to all other tested ABXS . Blood cultures negative . Mycoplasma IGM 0.97   YEAST MODERATE GROWTH on sputum culture   S/P EGD   with reported esophagitis. GB US Findings suggesting acute cholecystitis. HIIDA showed absent gallbladder filling. She was extubated on   Status post cholecystectomy tube  by interventional radiology.     Interval history :   PICC line was placed   She denied abdominal pain, no fever,no new complaints   She is  still on TPN  No fever    WBC normal.    Past Medical History:   Diagnosis Date    Abnormal computed tomography of cervical spine     sclerotic bone appearance     CVA (cerebral vascular accident) ([address]     left  side weakness    GERD (gastroesophageal reflux disease)     Hypertension     Paraproteinemia     Weight loss       Past Surgical History:   Procedure Laterality Date    INTUBATION  4/14/2019         PACEMAKER PLACEMENT  07/2011    Pacemaker is Medtronic Revo (compatible). Leads placed in 1995 are NOT MRI compatible. Placed at [address]. V's per Dr. Justine Bowman can not have an MRI.  UPPER GASTROINTESTINAL ENDOSCOPY N/A 4/16/2019    EGD ESOPHAGOGASTRODUODENOSCOPY @ BEDSIDE  ICU 2002 performed by González Shearer MD at [address]          Admission Meds  No current facility-administered medications on file prior to encounter. Current Outpatient Medications on File Prior to Encounter   Medication Sig Dispense Refill    oxyCODONE-acetaminophen (PERCOCET) 5-325 MG per tablet Take 1 tablet by mouth every 6 hours as needed for Pain.  senna-docusate (PERICOLACE) 8.6-50 MG per tablet Take 1 tablet by mouth 2 times daily      budesonide-formoterol (SYMBICORT) 160-4.5 MCG/ACT AERO Inhale 2 puffs into the lungs 2 times daily      sucralfate (CARAFATE) 1 GM/10ML suspension Take 1 g by mouth 4 times daily       traZODone (DESYREL) 50 MG tablet Take 50 mg by mouth nightly      tiZANidine (ZANAFLEX) 2 MG tablet Take 2 mg by mouth every 8 hours as needed (left knee)       aspirin 81 MG tablet Take 81 mg by mouth daily      clopidogrel (PLAVIX) 75 MG tablet TAKE 1 TABLET DAILY 30 tablet 2    DOCQLACE 100 MG capsule TAKE 1 CAPSULE BY MOUTH IN THE MORNING & IN THE EVENING -DRINK PLENTYOF WATER WHILE TAKING THIS MEDICINE 30 capsule 1    amLODIPine (NORVASC) 10 MG tablet Take 10 mg by mouth daily.  LORazepam (ATIVAN) 0.5 MG tablet Take 0.5 mg by mouth every 6 hours as needed for Anxiety.  therapeutic multivitamin-minerals (THERAGRAN-M) tablet Take 1 tablet by mouth daily.  omeprazole (PRILOSEC) 20 MG capsule Take 20 mg by mouth daily.       venlafaxine (EFFEXOR) 37.5 MG tablet Take 37.5 mg by mouth 3 times 0. 43 0.34 0.32   ALKPHOS 94 106* 97     No results for input(s): LIPASE, AMYLASE in the last 72 hours. Recent Labs     04/29/19  0510 04/30/19  0505 05/01/19  0458   PROTIME 15.3* 15.3* 15.5*   INR 1.2 1.2 1.2       Imaging Studies:                           All appropriate imaging studies and reports reviewed: Yes       Ct Abdomen Pelvis Wo Contrast Additional Contrast? Oral    Result Date: 4/14/2019  EXAMINATION: CT OF THE ABDOMEN AND PELVIS WITHOUT CONTRAST 4/14/2019 7:34 pm TECHNIQUE: CT of the abdomen and pelvis was performed without the administration of intravenous contrast. Multiplanar reformatted images are provided for review. Dose modulation, iterative reconstruction, and/or weight based adjustment of the mA/kV was utilized to reduce the radiation dose to as low as reasonably achievable. COMPARISON: 04/11/2019 HISTORY: ORDERING SYSTEM PROVIDED HISTORY: ABDOMINAL PAIN TECHNOLOGIST PROVIDED HISTORY: Water soluble contrast only please Ordering Physician Provided Reason for Exam: Abdominal pain - Vented patient. Contrast given via nurse through NG tube. Acuity: Unknown Type of Exam: Unknown Relevant Medical/Surgical History: Hx - Sepsis due to urinary tract infection. FINDINGS: Lower Chest: New moderate layering bilateral pleural effusions with bilateral lower lobe atelectasis. Organs: Limited evaluation due lack of intravenous contrast.  Cholelithiasis redemonstrated. No gallbladder wall thickening or biliary ductal dilatation. Scattered tiny hypodense lesions in the liver are too small to characterize but statistically represent benign cysts or hemangiomas and appear unchanged. The pancreas, spleen, adrenal glands, and kidneys are unremarkable. There is no hydronephrosis or urinary tract calculus. GI/Bowel: The stomach is distended. Enteric tube is in place. No contrast is seen distal to the pylorus and there is contrast reflux into the distal esophagus. There is no evidence of bowel obstruction.   The appendix is not definitely visualized. No focal pericecal inflammatory changes are evident. Pelvis: The urinary bladder is decompressed by Tran catheter. No pelvic mass is seen. Peritoneum/Retroperitoneum: Small amount of free fluid in the pelvic cavity. No free air or focal fluid collection. No abnormal lymph node. Normal abdominal aortic caliber. Moderate calcific atherosclerosis. Bones/Soft Tissues: No acute osseous abnormality. Diffuse anasarca. Moderate degenerative changes in the lumbar spine. 1. Distended, contrast filled stomach with reflux of contrast into the esophagus. No enteric contrast is seen distal to the pylorus suggesting delayed gastric emptying in the setting of ileus. 2. New moderate layering bilateral pleural effusions with bilateral lower lobe atelectasis. 3. Small amount of nonspecific free fluid in the pelvic cavity. 4. Anasarca. 5. Cholelithiasis. Xr Chest (single View Frontal)    Result Date: 4/13/2019  EXAMINATION: SINGLE XRAY VIEW OF THE CHEST 4/13/2019 7:18 am COMPARISON: April 12, 2019 HISTORY: ORDERING SYSTEM PROVIDED HISTORY: dyspnea TECHNOLOGIST PROVIDED HISTORY: dyspnea Ordering Physician Provided Reason for Exam: dyspnea Acuity: Acute Type of Exam: Subsequent/Follow-up Additional signs and symptoms: dyspnea FINDINGS: A right IJ catheter is seen with its tip terminating at the superior cavoatrial junction. The left chest wall pacemaker and leads are stable. The cardiomediastinal silhouette is stable. There is interval increased opacity at the right lung base, may be related to atelectasis versus pneumonia. There is small atelectasis and mild pleural effusion at the left lung base. There is no pneumothorax. There is no acute osseous abnormality. Interval increased opacity at the right lung base, may be related to atelectasis versus pneumonia. Small atelectasis and mild pleural effusion at the left lung, new since the prior study.      Xr Femur Left (min 2 Views)    Result Date: 3/27/2019  EXAMINATION: 4 XRAY VIEWS OF THE LEFT FEMUR; 2 XRAY VIEWS OF THE LEFT KNEE; 3 XRAY VIEWS OF THE LEFT TIBIA AND FIBULA 3/27/2019 7:00 pm COMPARISON: Left hip 11/14/2015. HISTORY: ORDERING SYSTEM PROVIDED HISTORY: pain TECHNOLOGIST PROVIDED HISTORY: pain Ordering Physician Provided Reason for Exam: pt twisted wrong, lt knee pain, unable to straighten knee. Acuity: Acute Type of Exam: Initial FINDINGS: Left femur, four views: The bones are diffusely osteopenic. No acute fracture deformity. The hip joint is maintained. The femoral head projects within the acetabulum. No focal soft tissue abnormality is seen. Left knee, two views: The knee is flexed. No acute osseous abnormality. No joint effusion. Joint spaces are not optimally profiled but no significant arthritic changes are apparent. Left tib-fib, three views: There is evidence of a remote healed distal tibia fracture. No acute fracture or dislocation is seen. No focal soft tissue abnormality. No acute osseous abnormality of the left femur. No acute osseous abnormality of the left knee. No acute osseous abnormality of the left tib-fib. Healed remote distal tibia fracture. Marked osteopenia, likely due to disuse. Xr Knee Left (1-2 Views)    Result Date: 3/27/2019  EXAMINATION: 4 XRAY VIEWS OF THE LEFT FEMUR; 2 XRAY VIEWS OF THE LEFT KNEE; 3 XRAY VIEWS OF THE LEFT TIBIA AND FIBULA 3/27/2019 7:00 pm COMPARISON: Left hip 11/14/2015. HISTORY: ORDERING SYSTEM PROVIDED HISTORY: pain TECHNOLOGIST PROVIDED HISTORY: pain Ordering Physician Provided Reason for Exam: pt twisted wrong, lt knee pain, unable to straighten knee. Acuity: Acute Type of Exam: Initial FINDINGS: Left femur, four views: The bones are diffusely osteopenic. No acute fracture deformity. The hip joint is maintained. The femoral head projects within the acetabulum. No focal soft tissue abnormality is seen. Left knee, two views: The knee is flexed.   No acute osseous abnormality. No joint effusion. Joint spaces are not optimally profiled but no significant arthritic changes are apparent. Left tib-fib, three views: There is evidence of a remote healed distal tibia fracture. No acute fracture or dislocation is seen. No focal soft tissue abnormality. No acute osseous abnormality of the left femur. No acute osseous abnormality of the left knee. No acute osseous abnormality of the left tib-fib. Healed remote distal tibia fracture. Marked osteopenia, likely due to disuse. Xr Knee Left (3 Views)    Result Date: 3/30/2019  EXAMINATION: 3 XRAY VIEWS OF THE LEFT KNEE 3/30/2019 10:58 am COMPARISON: March 27, 2018 HISTORY: ORDERING SYSTEM PROVIDED HISTORY: F/u L knee pain after cast TECHNOLOGIST PROVIDED HISTORY: AP, oblique, lateral F/u L knee pain after cast FINDINGS: Marked osteopenia. Interval casting, which degrades fine osseous detail question cortical offset in the lateral femoral metaphysis. No malalignment identified. No significant joint effusion. Interval casting. Question nondisplaced fracture in the lateral femoral metaphysis. Osteopenia. Xr Tibia Fibula Left (2 Views)    Result Date: 3/27/2019  EXAMINATION: 4 XRAY VIEWS OF THE LEFT FEMUR; 2 XRAY VIEWS OF THE LEFT KNEE; 3 XRAY VIEWS OF THE LEFT TIBIA AND FIBULA 3/27/2019 7:00 pm COMPARISON: Left hip 11/14/2015. HISTORY: ORDERING SYSTEM PROVIDED HISTORY: pain TECHNOLOGIST PROVIDED HISTORY: pain Ordering Physician Provided Reason for Exam: pt twisted wrong, lt knee pain, unable to straighten knee. Acuity: Acute Type of Exam: Initial FINDINGS: Left femur, four views: The bones are diffusely osteopenic. No acute fracture deformity. The hip joint is maintained. The femoral head projects within the acetabulum. No focal soft tissue abnormality is seen. Left knee, two views: The knee is flexed. No acute osseous abnormality. No joint effusion.   Joint spaces are not optimally profiled but no Date: 4/11/2019  EXAMINATION: CT OF THE ABDOMEN AND PELVIS WITH CONTRAST 4/11/2019 5:14 pm TECHNIQUE: CT of the abdomen and pelvis was performed with the administration of intravenous contrast. Multiplanar reformatted images are provided for review. Dose modulation, iterative reconstruction, and/or weight based adjustment of the mA/kV was utilized to reduce the radiation dose to as low as reasonably achievable. COMPARISON: None. HISTORY: ORDERING SYSTEM PROVIDED HISTORY: Abdominal pain TECHNOLOGIST PROVIDED HISTORY: IV Only Contrast Ordering Physician Provided Reason for Exam: patient c/o abd pain for an hour FINDINGS: Lower Chest: Trace pleural fluid bilaterally is noted. Indwelling cardiac pacemaker is present. Heart size is normal.  Coronary artery calcifications are evident. The esophagus is significantly dilated and fluid-filled. No paraesophageal adenopathy is evident. Organs: The spleen, pancreas, and adrenals are unremarkable. The liver contains hypodensities, likely cysts. The gallbladder is distended and contains a solitary gallstone. The kidneys excrete contrast bilaterally. Extrarenal collecting systems are noted. The ureters are mildly dilated down to the urinary bladder without evidence of intraluminal or ureteral obstructing calculi. GI/Bowel: Marked distention of the stomach is noted; this continues to the pylorus with appearance suggesting partial gastric outlet obstruction. Fluid is present in some small bowel loops and colon distal to the stomach. No small bowel obstruction. Pelvis: Marked distention of the urinary bladder is noted. 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 Distended ureters may be related to reflux or infection. No free pelvic fluid, pelvic or inguinal adenopathy is noted. Peritoneum/Retroperitoneum: No aortic aneurysm. Mild to moderate plaque is noted in the infrarenal abdominal aorta and both proximal iliac arteries.  Shotty lymph nodes are present around the aorta in the upper abdomen. No mesenteric adenopathy is noted. Bones/Soft Tissues: Degenerative changes are present in the hips and lower lumbar facets. Estimated biologic radiation dose for this procedure:258.77 mGy/cm2.     1. Dilatation of the thoracic esophagus filled with fluid. No obstructive process is noted. No adjacent enlarged lymph nodes. 2. Trace pleural fluid. 3. Marked distention of the stomach. This appears to extend to the pylorus. Partial gastric outlet obstruction is not excluded. 4. Bilateral dilated ureters without obstructing calculi. Urinary bladder is markedly distended with bladder wall air suggesting emphysematous cystitis. Dilatation of the ureters may be related to reflux or infection. 5. Atherosclerotic disease. 6. Other findings as above. Critical results were called by Dr. Yifan Yi MD to [address] on 4/11/2019 at 17:37. Xr Chest Portable    Result Date: 4/16/2019  EXAMINATION: SINGLE XRAY VIEW OF THE CHEST 4/16/2019 6:54 am COMPARISON: 04/15/2019, 610 hours HISTORY: ORDERING SYSTEM PROVIDED HISTORY: ETT placement TECHNOLOGIST PROVIDED HISTORY: ETT placement Ordering Physician Provided Reason for Exam: on vent Acuity: Acute Type of Exam: Initial 77-year-old female on ventilator; check endotracheal tube placement FINDINGS: Portable AP upright view of the chest. Endotracheal tube distal tip overlying the mid trachea approximately 4.1 cm above the level of the ron. Enteric tube traverses the GE junction with distal tip excluded from the field of view. Left subclavian approach cardiac pacemaker device distal lead tips relatively stable in position. Right internal jugular approach central venous catheter distal tip overlying the high right atrium, stable. Cardiac monitor leads overlie the chest. Atherosclerotic calcification of the thoracic aorta. Slight stable volume loss of the left hemithorax. No pneumothorax. No free air.   Dense retrocardiac/left basilar airspace consolidation and small left-sided pleural effusion. Stable mild focal opacity at the right mid lung zone. Underlying COPD. Stable mild pulmonary vascular congestion and left-sided predominant parahilar opacity. Visualized osseous structures remain unchanged. 1. Stable multifocal airspace disease as detailed above with dense retrocardiac/left basilar airspace consolidation and small left-sided pleural effusion. Mild pulmonary vascular congestion. Findings may represent edema or multifocal pneumonia. 2. Underlying COPD. 3. Tubes and line as detailed above. Xr Chest Portable    Result Date: 4/15/2019  EXAMINATION: SINGLE XRAY VIEW OF THE CHEST 4/15/2019 6:47 am COMPARISON: 14 April 2019 HISTORY: ORDERING SYSTEM PROVIDED HISTORY: ETT placement TECHNOLOGIST PROVIDED HISTORY: ETT placement Ordering Physician Provided Reason for Exam: on vent Acuity: Acute Type of Exam: Initial FINDINGS: AP portable view of the chest time stamped at 612 hours demonstrates overlying cardiac monitoring electrodes. Endotracheal tube terminates 4 cm above the ron. Bipolar pacemaker enters from the left with intact leads in appropriate positions. Intestinal tube extends beyond the fundus of the stomach, tip not included. Right internal jugular catheter terminates at the cavoatrial junction. Heart size is normal.  Aortic arch is calcified. There is interval improvement in vascular congestion with resolution of perihilar opacities. Some bibasilar opacities remain. No extrapleural air is noted. Osseous structures are stable. Interval improvement in vascular congestion and bilateral opacities consistent with resolving pulmonary edema. Tubes and lines as above. Xr Chest Portable    Result Date: 4/14/2019  EXAMINATION: SINGLE XRAY VIEW OF THE CHEST 4/14/2019 7:57 am COMPARISON: Portable chest 04/13/2019.  HISTORY: ORDERING SYSTEM PROVIDED HISTORY: Intubation TECHNOLOGIST PROVIDED HISTORY: Intubation Ordering Physician Provided Reason for Exam: intubation Acuity: Acute Type of Exam: Initial Additional signs and symptoms: intubation FINDINGS: Endotracheal tube terminates over the midthoracic trachea. Dual-chamber pacemaker leads appear unchanged in position. Right IJ approach central venous catheter unchanged in position. Heart size not substantially changed. Perihilar and basilar opacities further increased. Left pleural effusion increased in size. Findings may reflect pulmonary edema, progressed from yesterday's exam.  Left pleural effusion increased in size. Xr Chest Portable    Result Date: 4/12/2019  EXAMINATION: SINGLE XRAY VIEW OF THE CHEST 4/12/2019 5:26 am COMPARISON: November 14, 2015. HISTORY: ORDERING SYSTEM PROVIDED HISTORY: line placement TECHNOLOGIST PROVIDED HISTORY: line placement Ordering Physician Provided Reason for Exam: New right side line placement. Acuity: Acute Type of Exam: Initial Additional signs and symptoms: New right side line placement. FINDINGS: Stable left pectoral trans venous cardiac pacer device. New right IJ central venous catheter with tip near the superior atrial caval junction. Normal lung volume. No new consolidation. Curvilinear radiopacity projecting over the right upper lobe likely represents artifact from a skin fold. No pleural effusion or pneumothorax. Stable cardiomediastinal silhouette and great vessels with redemonstration of atherosclerotic thoracic aorta. New right IJ central venous catheter with tip near the superior atrial caval junction. No pneumothorax. No new consolidation. Thank you for allowing me to participate in the care of your patient. Please feel free to contact me with any questions or concerns.      Steffanie Pacheco MD

## 2019-05-01 NOTE — PROGRESS NOTES
Physical Therapy  Facility/Department: [facility]  Daily Treatment Note  NAME: Lanie Paget  : 1950  MRN: [id number]    Date of Service: 2019    Discharge Recommendations:  ECF with PT        Patient Diagnosis(es): The primary encounter diagnosis was Urinary retention. Diagnoses of Emphysematous cystitis and Septicemia ([facility] [address] were also pertinent to this visit. has a past medical history of Abnormal computed tomography of cervical spine, CVA (cerebral vascular accident) ([facility] [address] GERD (gastroesophageal reflux disease), Hypertension, Paraproteinemia, and Weight loss. has a past surgical history that includes pacemaker placement (2011); intubation (2019); and Upper gastrointestinal endoscopy (N/A, 2019). Restrictions  Restrictions/Precautions  Restrictions/Precautions: Fall Risk  Implants present? : Pacemaker  Position Activity Restriction  Other position/activity restrictions: LLE in a long leg cast  Subjective   General  Chart Reviewed: Yes  Additional Pertinent Hx: admitted 19 due to abdominal pain  Family / Caregiver Present: No  Referring Practitioner: DR Jhonathan Hayden  Subjective  Subjective: Pt is in bed upon arrival. Pt is agreeable to PT/OT this date. PT requires Max encouragement to participate. General Comment  Comments: VICKY koehler's pt for PT  Pain Screening  Patient Currently in Pain: Yes  Pain Assessment  Pain Assessment: 0-10  Pain Level: 5  Pain Type: Chronic pain  Pain Location: Back;Generalized  Non-Pharmaceutical Pain Intervention(s): Repositioned  Response to Pain Intervention: Patient Satisfied  Vital Signs  Patient Currently in Pain: Yes  Oxygen Therapy  O2 Device: Nasal cannula       Orientation   WFL  Objective   Bed mobility  Supine to Sit: Dependent/Total  Sit to Supine: Dependent/Total  Scooting: Dependent/Total  Comment: Pt requires max encouragement to sit EOB this date. PT is agreeable however requires total assist x2.  Pt requires step by step direction to complete. Transfers  Comment: NT           Other exercises  Other exercises 4: Sat EOB x~15 min  Other exercises 5: sitting balance CGA to Mod A x1. Other exercises 6: Seated functional dynamic balance activities with RUE. fair carryover. Pt able to facilitate  RUE D1 flexion to get a drink of water. Pt requires Min A to complete. Pt able to don chap stick with RUE. Other exercises 7: Seated LAQ RLE x2. PT reports \"Thats all I got\". Pt refuses writer to assist with AAROM/PROM with RLE. Pt reports pain to the touch. Other exercises 8: Pt educated on the importance of functional mobility with fair understanding. \"I dont like to be pushed\" referring to encouragement to increase mobility. Assessment   Body structures, Functions, Activity limitations: Decreased functional mobility ; Decreased ADL status; Decreased strength; Increased Pain  Assessment: PT would benefit from additional PT to increase strength, endurance, and indpendence with functional mobility. Treatment Diagnosis: weakness all 4's difficulty walking  Specific instructions for Next Treatment: progress to mobility as able  Prognosis: Good  Patient Education: POC, importance of functional mobility.    REQUIRES PT FOLLOW UP: Yes  Activity Tolerance  Activity Tolerance: Patient limited by pain(anxiety)     Goals  Short term goals  Time Frame for Short term goals: 10 visits  Short term goal 1: improve strength RUE/RLE to 4/5 to assist in bed mobility and transfers  Short term goal 2: improve bed mobility to minx1  Short term goal 3: improve transfers to minx1  Short term goal 4: progress to Gait and/or use of wheelchair for mobility  Patient Goals   Patient goals : return home    Plan    Plan  Times per week: 5-7x/wk  Times per day: Daily  Specific instructions for Next Treatment: progress to mobility as able  Current Treatment Recommendations: ROM, Strengthening, Functional Mobility Training, Transfer Training  Plan Comment: to continue PT per POC  Safety Devices  Type of devices: Call light within reach, Left in bed  Restraints  Initially in place: No  Restraints: B wrist restraint     Therapy Time   Individual Concurrent Group Co-treatment   Time In 1319         Time Out 1350         Minutes Brooke Herbert. [address]., [place]

## 2019-05-02 ENCOUNTER — APPOINTMENT (OUTPATIENT)
Dept: GENERAL RADIOLOGY | Age: 69
DRG: 853 | End: 2019-05-02
Payer: COMMERCIAL

## 2019-05-02 LAB
-: NORMAL
ABSOLUTE EOS #: 0.1 K/UL (ref 0–0.4)
ABSOLUTE IMMATURE GRANULOCYTE: ABNORMAL K/UL (ref 0–0.3)
ABSOLUTE LYMPH #: 0.7 K/UL (ref 1–4.8)
ABSOLUTE MONO #: 0.2 K/UL (ref 0.1–1.3)
ALBUMIN SERPL-MCNC: 1.5 G/DL (ref 3.5–5.2)
ALBUMIN/GLOBULIN RATIO: ABNORMAL (ref 1–2.5)
ALP BLD-CCNC: 97 U/L (ref 35–104)
ALT SERPL-CCNC: 13 U/L (ref 5–33)
ANION GAP SERPL CALCULATED.3IONS-SCNC: 8 MMOL/L (ref 9–17)
AST SERPL-CCNC: 20 U/L
BASOPHILS # BLD: 0 % (ref 0–2)
BASOPHILS ABSOLUTE: 0 K/UL (ref 0–0.2)
BILIRUB SERPL-MCNC: 0.34 MG/DL (ref 0.3–1.2)
BNP INTERPRETATION: ABNORMAL
BUN BLDV-MCNC: 18 MG/DL (ref 8–23)
BUN/CREAT BLD: ABNORMAL (ref 9–20)
C-REACTIVE PROTEIN: 111.7 MG/L (ref 0–5)
CALCIUM SERPL-MCNC: 7.5 MG/DL (ref 8.6–10.4)
CHLORIDE BLD-SCNC: 100 MMOL/L (ref 98–107)
CO2: 26 MMOL/L (ref 20–31)
CREAT SERPL-MCNC: <0.4 MG/DL (ref 0.5–0.9)
DATE, STOOL #1: ABNORMAL
DATE, STOOL #2: ABNORMAL
DATE, STOOL #3: ABNORMAL
DIFFERENTIAL TYPE: ABNORMAL
EOSINOPHILS RELATIVE PERCENT: 1 % (ref 0–4)
GFR AFRICAN AMERICAN: ABNORMAL ML/MIN
GFR NON-AFRICAN AMERICAN: ABNORMAL ML/MIN
GFR SERPL CREATININE-BSD FRML MDRD: ABNORMAL ML/MIN/{1.73_M2}
GFR SERPL CREATININE-BSD FRML MDRD: ABNORMAL ML/MIN/{1.73_M2}
GLUCOSE BLD-MCNC: 114 MG/DL (ref 65–105)
GLUCOSE BLD-MCNC: 120 MG/DL (ref 65–105)
GLUCOSE BLD-MCNC: 120 MG/DL (ref 65–105)
GLUCOSE BLD-MCNC: 120 MG/DL (ref 70–99)
GLUCOSE BLD-MCNC: 128 MG/DL (ref 65–105)
GLUCOSE BLD-MCNC: 133 MG/DL (ref 65–105)
GLUCOSE BLD-MCNC: 137 MG/DL (ref 65–105)
GLUCOSE BLD-MCNC: 145 MG/DL (ref 65–105)
HCT VFR BLD CALC: 22 % (ref 36–46)
HEMOCCULT SP1 STL QL: POSITIVE
HEMOCCULT SP2 STL QL: ABNORMAL
HEMOCCULT SP3 STL QL: ABNORMAL
HEMOGLOBIN: 7.2 G/DL (ref 12–16)
IMMATURE GRANULOCYTES: ABNORMAL %
INR BLD: 1.3
LYMPHOCYTES # BLD: 10 % (ref 24–44)
MCH RBC QN AUTO: 29.2 PG (ref 26–34)
MCHC RBC AUTO-ENTMCNC: 32.7 G/DL (ref 31–37)
MCV RBC AUTO: 89.5 FL (ref 80–100)
MONOCYTES # BLD: 3 % (ref 1–7)
NRBC AUTOMATED: ABNORMAL PER 100 WBC
PDW BLD-RTO: 15.9 % (ref 11.5–14.9)
PLATELET # BLD: 111 K/UL (ref 150–450)
PLATELET ESTIMATE: ABNORMAL
PMV BLD AUTO: 9.3 FL (ref 6–12)
POTASSIUM SERPL-SCNC: 4.2 MMOL/L (ref 3.7–5.3)
PRO-BNP: 1063 PG/ML
PROCALCITONIN: 0.09 NG/ML
PROTHROMBIN TIME: 15.9 SEC (ref 11.8–14.6)
RBC # BLD: 2.46 M/UL (ref 4–5.2)
RBC # BLD: ABNORMAL 10*6/UL
REASON FOR REJECTION: NORMAL
SEDIMENTATION RATE, ERYTHROCYTE: 82 MM (ref 0–20)
SEG NEUTROPHILS: 86 % (ref 36–66)
SEGMENTED NEUTROPHILS ABSOLUTE COUNT: 6.1 K/UL (ref 1.3–9.1)
SODIUM BLD-SCNC: 134 MMOL/L (ref 135–144)
TIME, STOOL #1: ABNORMAL
TIME, STOOL #2: ABNORMAL
TIME, STOOL #3: ABNORMAL
TOTAL PROTEIN: 4.5 G/DL (ref 6.4–8.3)
WBC # BLD: 7.2 K/UL (ref 3.5–11)
WBC # BLD: ABNORMAL 10*3/UL
ZZ NTE CLEAN UP: ORDERED TEST: NORMAL
ZZ NTE WITH NAME CLEAN UP: SPECIMEN SOURCE: NORMAL

## 2019-05-02 PROCEDURE — 6370000000 HC RX 637 (ALT 250 FOR IP): Performed by: INTERNAL MEDICINE

## 2019-05-02 PROCEDURE — 71045 X-RAY EXAM CHEST 1 VIEW: CPT

## 2019-05-02 PROCEDURE — 84145 PROCALCITONIN (PCT): CPT

## 2019-05-02 PROCEDURE — 99222 1ST HOSP IP/OBS MODERATE 55: CPT | Performed by: INTERNAL MEDICINE

## 2019-05-02 PROCEDURE — 2580000003 HC RX 258: Performed by: INTERNAL MEDICINE

## 2019-05-02 PROCEDURE — 6360000002 HC RX W HCPCS: Performed by: INTERNAL MEDICINE

## 2019-05-02 PROCEDURE — 85025 COMPLETE CBC W/AUTO DIFF WBC: CPT

## 2019-05-02 PROCEDURE — 94761 N-INVAS EAR/PLS OXIMETRY MLT: CPT

## 2019-05-02 PROCEDURE — 99232 SBSQ HOSP IP/OBS MODERATE 35: CPT | Performed by: INTERNAL MEDICINE

## 2019-05-02 PROCEDURE — 83880 ASSAY OF NATRIURETIC PEPTIDE: CPT

## 2019-05-02 PROCEDURE — G0328 FECAL BLOOD SCRN IMMUNOASSAY: HCPCS

## 2019-05-02 PROCEDURE — 2500000003 HC RX 250 WO HCPCS: Performed by: STUDENT IN AN ORGANIZED HEALTH CARE EDUCATION/TRAINING PROGRAM

## 2019-05-02 PROCEDURE — 6370000000 HC RX 637 (ALT 250 FOR IP): Performed by: STUDENT IN AN ORGANIZED HEALTH CARE EDUCATION/TRAINING PROGRAM

## 2019-05-02 PROCEDURE — 85651 RBC SED RATE NONAUTOMATED: CPT

## 2019-05-02 PROCEDURE — 94640 AIRWAY INHALATION TREATMENT: CPT

## 2019-05-02 PROCEDURE — 82947 ASSAY GLUCOSE BLOOD QUANT: CPT

## 2019-05-02 PROCEDURE — 80053 COMPREHEN METABOLIC PANEL: CPT

## 2019-05-02 PROCEDURE — 2060000000 HC ICU INTERMEDIATE R&B

## 2019-05-02 PROCEDURE — 93005 ELECTROCARDIOGRAM TRACING: CPT

## 2019-05-02 PROCEDURE — 2700000000 HC OXYGEN THERAPY PER DAY

## 2019-05-02 PROCEDURE — 36415 COLL VENOUS BLD VENIPUNCTURE: CPT

## 2019-05-02 PROCEDURE — 99233 SBSQ HOSP IP/OBS HIGH 50: CPT | Performed by: INTERNAL MEDICINE

## 2019-05-02 PROCEDURE — 85610 PROTHROMBIN TIME: CPT

## 2019-05-02 PROCEDURE — 86140 C-REACTIVE PROTEIN: CPT

## 2019-05-02 PROCEDURE — 6360000002 HC RX W HCPCS: Performed by: STUDENT IN AN ORGANIZED HEALTH CARE EDUCATION/TRAINING PROGRAM

## 2019-05-02 PROCEDURE — 2500000003 HC RX 250 WO HCPCS: Performed by: SURGERY

## 2019-05-02 RX ORDER — PREDNISONE 10 MG/1
10 TABLET ORAL DAILY
Status: DISCONTINUED | OUTPATIENT
Start: 2019-05-03 | End: 2019-05-10

## 2019-05-02 RX ADMIN — Medication 2 PUFF: at 08:47

## 2019-05-02 RX ADMIN — METOCLOPRAMIDE 10 MG: 5 INJECTION, SOLUTION INTRAMUSCULAR; INTRAVENOUS at 03:00

## 2019-05-02 RX ADMIN — CLOPIDOGREL BISULFATE 75 MG: 75 TABLET ORAL at 08:47

## 2019-05-02 RX ADMIN — METOPROLOL TARTRATE 12.5 MG: 25 TABLET ORAL at 22:28

## 2019-05-02 RX ADMIN — LEVALBUTEROL 1.25 MG: 1.25 SOLUTION, CONCENTRATE RESPIRATORY (INHALATION) at 14:46

## 2019-05-02 RX ADMIN — METOCLOPRAMIDE 10 MG: 5 INJECTION, SOLUTION INTRAMUSCULAR; INTRAVENOUS at 14:53

## 2019-05-02 RX ADMIN — OXYCODONE AND ACETAMINOPHEN 1 TABLET: 5; 325 TABLET ORAL at 22:29

## 2019-05-02 RX ADMIN — IPRATROPIUM BROMIDE 0.5 MG: 0.5 SOLUTION RESPIRATORY (INHALATION) at 06:58

## 2019-05-02 RX ADMIN — Medication 10 ML: at 08:48

## 2019-05-02 RX ADMIN — INSULIN LISPRO 2 UNITS: 100 INJECTION, SOLUTION INTRAVENOUS; SUBCUTANEOUS at 17:12

## 2019-05-02 RX ADMIN — Medication 2 PUFF: at 22:28

## 2019-05-02 RX ADMIN — OXYCODONE AND ACETAMINOPHEN 1 TABLET: 5; 325 TABLET ORAL at 17:11

## 2019-05-02 RX ADMIN — PANTOPRAZOLE SODIUM 40 MG: 40 TABLET, DELAYED RELEASE ORAL at 07:10

## 2019-05-02 RX ADMIN — PREDNISONE 20 MG: 20 TABLET ORAL at 08:47

## 2019-05-02 RX ADMIN — LEVALBUTEROL 1.25 MG: 1.25 SOLUTION, CONCENTRATE RESPIRATORY (INHALATION) at 06:58

## 2019-05-02 RX ADMIN — FUROSEMIDE 40 MG: 10 INJECTION, SOLUTION INTRAMUSCULAR; INTRAVENOUS at 11:46

## 2019-05-02 RX ADMIN — I.V. FAT EMULSION 100 ML: 20 EMULSION INTRAVENOUS at 17:12

## 2019-05-02 RX ADMIN — Medication 10 ML: at 20:53

## 2019-05-02 RX ADMIN — METOCLOPRAMIDE 10 MG: 5 INJECTION, SOLUTION INTRAMUSCULAR; INTRAVENOUS at 08:47

## 2019-05-02 RX ADMIN — METOCLOPRAMIDE 10 MG: 5 INJECTION, SOLUTION INTRAMUSCULAR; INTRAVENOUS at 20:53

## 2019-05-02 RX ADMIN — IPRATROPIUM BROMIDE 0.5 MG: 0.5 SOLUTION RESPIRATORY (INHALATION) at 14:46

## 2019-05-02 RX ADMIN — METOPROLOL TARTRATE 12.5 MG: 25 TABLET ORAL at 08:48

## 2019-05-02 RX ADMIN — VENLAFAXINE 37.5 MG: 37.5 TABLET ORAL at 14:53

## 2019-05-02 RX ADMIN — FLUCONAZOLE 200 MG: 100 TABLET ORAL at 08:47

## 2019-05-02 RX ADMIN — POTASSIUM CHLORIDE: 2 INJECTION, SOLUTION, CONCENTRATE INTRAVENOUS at 17:12

## 2019-05-02 RX ADMIN — OXYCODONE AND ACETAMINOPHEN 1 TABLET: 5; 325 TABLET ORAL at 03:38

## 2019-05-02 RX ADMIN — ASPIRIN 81 MG: 81 TABLET, COATED ORAL at 08:47

## 2019-05-02 RX ADMIN — VENLAFAXINE 37.5 MG: 37.5 TABLET ORAL at 22:28

## 2019-05-02 RX ADMIN — VENLAFAXINE 37.5 MG: 37.5 TABLET ORAL at 08:47

## 2019-05-02 ASSESSMENT — PAIN SCALES - GENERAL
PAINLEVEL_OUTOF10: 0
PAINLEVEL_OUTOF10: 4
PAINLEVEL_OUTOF10: 8
PAINLEVEL_OUTOF10: 10
PAINLEVEL_OUTOF10: 10
PAINLEVEL_OUTOF10: 5

## 2019-05-02 ASSESSMENT — PAIN DESCRIPTION - PAIN TYPE
TYPE: CHRONIC PAIN
TYPE: CHRONIC PAIN

## 2019-05-02 ASSESSMENT — ENCOUNTER SYMPTOMS
ABDOMINAL PAIN: 0
DIARRHEA: 1
CONSTIPATION: 0
SHORTNESS OF BREATH: 0
VOMITING: 0
NAUSEA: 1

## 2019-05-02 ASSESSMENT — PAIN DESCRIPTION - LOCATION
LOCATION: GENERALIZED
LOCATION: GENERALIZED

## 2019-05-02 NOTE — PROGRESS NOTES
RN updated medicine resident about patient's large urine occurrence as she was concerned about output with morning rounds. Also updated that patient had a moderate sized loose bloody stool. RN inquired about patient's Lovenox orders with current labs. MD states will place orders for stool occult blood.

## 2019-05-02 NOTE — PROGRESS NOTES
Nutrition Assessment (Parenteral Nutrition)    Type and Reason for Visit: Reassess    Nutrition Recommendations: Continue TPN with the following adjustments to additives: remove MVI and Trace Elements. Continue to offer current diet. Nutrition Assessment: Pt relatively unchanged from a nutritional standpoint as TPN and lipids continue to provide nutritional support with minimal PO intake. Pt has passed stool that is postive for occult blood and cholecystectomy is eventually planned. Feeding tube has been discussed with pt by physician, but pt has declined. Await GI consult. Continuation of TPN with lipids appears appropriate at this time. Question if J-tube placement would be a future consideration dependent on source/cause of bloody stool and need for gallbladder removal or if parenteral nutrition is the preferred option for nutritional support. Malnutrition Assessment:  · Malnutrition Status: At risk for malnutrition  · Context: Acute illness or injury  · Findings of the 6 clinical characteristics of malnutrition (Minimum of 2 out of 6 clinical characteristics is required to make the diagnosis of moderate or severe Protein Calorie Malnutrition based on AND/ASPEN Guidelines):  1. Energy Intake-Less than or equal to 75% of estimated energy requirement(Previously; improving with parenteral nutrition), Greater than or equal to 5 days    2. Weight Loss-Unable to assess(edema present),    3. Fat Loss-Unable to assess,    4. Muscle Loss-Unable to assess,    5. Fluid Accumulation-Mild fluid accumulation, Extremities  6.   Strength-Not measured    Nutrition Risk Level: High    Nutrient Needs:  · Estimated Daily Total Kcal: 9856-9458 based on wt of 25-27 per kg using wt of 57.2 kg  · Estimated Daily Protein (g): 63-68 based on 1.4-1.5 gm/kg IBW(revised)    Nutrition Diagnosis:   · Problem: Inadequate oral intake  · Etiology: related to Alteration in GI function, Insufficient energy/nutrient consumption  Signs and symptoms:  as evidenced by Nutrition support - PN, Intake 0-25%, GI abnormality    Objective Information:  · Nutrition-Focused Physical Findings: Loss of appetite. Edema: +1 RUE, RLE, LLE; +2 LUE. · Wound Type: Multiple, Pressure Ulcer, Deep Tissue Injury(Wound under cast)  · Current Nutrition Therapies:  · Oral Diet Orders: Low Fat   · Oral Diet intake: 0%, 1-25%  · Parenteral Nutrition Orders:  · Type and Formula: Premix Central, 2-in-1 Custom   · Lipids: 100ml, Daily  · Rate/Volume: 65 ml/ 1560 ml daily  · Duration: Continuous  · Current PN Order Provides: 1573 kcals, 78 gm of protein (lipids included)  · Goal PN Orders Provides: 1008-4937 kcal; 63-68 gm pro  · Anthropometric Measures:  · Ht: 5' (152.4 cm)   · Current Body Wt: 108 lb (49 kg)(5-1-19)  · Admission Body Wt: 102 lb (46.3 kg)  · Ideal Body Wt: 100 lb (45.4 kg), % Ideal Body 108%  · BMI Classification: BMI 18.5 - 24.9 Normal Weight(Based on admission wt)    Nutrition Interventions:   Continue current diet, Modify current Parenteral Nutrition  Continued Inpatient Monitoring    Nutrition Evaluation:   · Evaluation: Progressing toward goals   · Goals: PN to meet greater 90% of estimated nutrition needs   · Monitoring: Meal Intake, PN Intake, Diet Tolerance, Nausea or Vomiting, Weight, Pertinent Labs, Monitor Bowel Function, I&O, Skin Integrity    Missy Mejias R.D., L.D.   Phone: [phone]

## 2019-05-02 NOTE — PROGRESS NOTES
Infectious disease Consult Note      Patient: Ned Noriega  : 1950  Acct#:  [id number]     Date:  2019    Assessment:     Cholecystitis status post cholecystectomy tube  grew Candida non-albicans and one colony of ESBL Klebsiella on culture . Clinically improving     Ecoli Emphysematous cystitis treated     Aspiration pneumonia of right lower lobe treated    Sepsis /Septic shock     Yeast growth on sputum culture suspect contamination     Leukocytosis resolved    Acute hypoxic resp failure resolved    MRSA carrier    Urinary retention     Anemia      PCN allergy                 Recommendations:     PO Diflucan until 5 /3  Follow CBC , renal function closely . Subjective:       History of Present Illness  Patient is a 77 y.o.  female admitted with Sepsis due to urinary tract infection   who is seen in consult for the same . She presented w dysuria and lower abd pain for around a week associated with vomiting ,was found hypotensive with leukocytosis . CT suggested emphysematous cystitis,possible gastric outlet obstruction. CXR showed a right lower infiltrate. High CA-19-9,CEA  Allergy to [employer] w SOB   Urine culture  grew ESCHERICHIA COLI resistant to Ampicillin ,sensitive to all other tested ABXS . Blood cultures negative . Mycoplasma IGM 0.97   YEAST MODERATE GROWTH on sputum culture   S/P EGD   with reported esophagitis. GB US Findings suggesting acute cholecystitis. HIIDA showed absent gallbladder filling. She was extubated on   Status post cholecystectomy tube  by interventional radiology.    PICC line was placed     Interval history :  Reddish stool was noted per her nurse ,OB positive ,GI consulted   She is complaining of abdominal pain,loose stool , no fever  She is  still on TPN  No fever    WBC normal.    Past Medical History:   Diagnosis Date    Abnormal computed tomography of cervical spine     sclerotic bone appearance     CVA [facility]cerebral vascular accident) ([facility] [address]     left  side weakness    GERD (gastroesophageal reflux disease)     Hypertension     Paraproteinemia     Weight loss       Past Surgical History:   Procedure Laterality Date    INTUBATION  4/14/2019         PACEMAKER PLACEMENT  07/2011    Pacemaker is Medtronic Revo (compatible). Leads placed in 1995 are NOT MRI compatible. Placed at [facility]. V's per Dr. Lue Koyanagi can not have an MRI.  UPPER GASTROINTESTINAL ENDOSCOPY N/A 4/16/2019    EGD ESOPHAGOGASTRODUODENOSCOPY @ BEDSIDE  ICU 2002 performed by Saul Ashby MD at [address]          Admission Meds  No current facility-administered medications on file prior to encounter. Current Outpatient Medications on File Prior to Encounter   Medication Sig Dispense Refill    oxyCODONE-acetaminophen (PERCOCET) 5-325 MG per tablet Take 1 tablet by mouth every 6 hours as needed for Pain.  senna-docusate (PERICOLACE) 8.6-50 MG per tablet Take 1 tablet by mouth 2 times daily      budesonide-formoterol (SYMBICORT) 160-4.5 MCG/ACT AERO Inhale 2 puffs into the lungs 2 times daily      sucralfate (CARAFATE) 1 GM/10ML suspension Take 1 g by mouth 4 times daily       traZODone (DESYREL) 50 MG tablet Take 50 mg by mouth nightly      tiZANidine (ZANAFLEX) 2 MG tablet Take 2 mg by mouth every 8 hours as needed (left knee)       aspirin 81 MG tablet Take 81 mg by mouth daily      clopidogrel (PLAVIX) 75 MG tablet TAKE 1 TABLET DAILY 30 tablet 2    DOCQLACE 100 MG capsule TAKE 1 CAPSULE BY MOUTH IN THE MORNING & IN THE EVENING -DRINK PLENTYOF WATER WHILE TAKING THIS MEDICINE 30 capsule 1    amLODIPine (NORVASC) 10 MG tablet Take 10 mg by mouth daily.  LORazepam (ATIVAN) 0.5 MG tablet Take 0.5 mg by mouth every 6 hours as needed for Anxiety.  therapeutic multivitamin-minerals (THERAGRAN-M) tablet Take 1 tablet by mouth daily.  omeprazole (PRILOSEC) 20 MG capsule Take 20 mg by mouth daily.       venlafaxine (EFFEXOR) 37.5 20   ALT 15 13 13   BILITOT 0.34 0.32 0.34   ALKPHOS 106* 97 97     No results for input(s): LIPASE, AMYLASE in the last 72 hours. Recent Labs     04/30/19  0505 05/01/19  0458 05/02/19  0506   PROTIME 15.3* 15.5* 15.9*   INR 1.2 1.2 1.3       Imaging Studies:                           All appropriate imaging studies and reports reviewed: Yes       Ct Abdomen Pelvis Wo Contrast Additional Contrast? Oral    Result Date: 4/14/2019  EXAMINATION: CT OF THE ABDOMEN AND PELVIS WITHOUT CONTRAST 4/14/2019 7:34 pm TECHNIQUE: CT of the abdomen and pelvis was performed without the administration of intravenous contrast. Multiplanar reformatted images are provided for review. Dose modulation, iterative reconstruction, and/or weight based adjustment of the mA/kV was utilized to reduce the radiation dose to as low as reasonably achievable. COMPARISON: 04/11/2019 HISTORY: ORDERING SYSTEM PROVIDED HISTORY: ABDOMINAL PAIN TECHNOLOGIST PROVIDED HISTORY: Water soluble contrast only please Ordering Physician Provided Reason for Exam: Abdominal pain - Vented patient. Contrast given via nurse through NG tube. Acuity: Unknown Type of Exam: Unknown Relevant Medical/Surgical History: Hx - Sepsis due to urinary tract infection. FINDINGS: Lower Chest: New moderate layering bilateral pleural effusions with bilateral lower lobe atelectasis. Organs: Limited evaluation due lack of intravenous contrast.  Cholelithiasis redemonstrated. No gallbladder wall thickening or biliary ductal dilatation. Scattered tiny hypodense lesions in the liver are too small to characterize but statistically represent benign cysts or hemangiomas and appear unchanged. The pancreas, spleen, adrenal glands, and kidneys are unremarkable. There is no hydronephrosis or urinary tract calculus. GI/Bowel: The stomach is distended. Enteric tube is in place. No contrast is seen distal to the pylorus and there is contrast reflux into the distal esophagus.   There is no evidence of bowel obstruction. The appendix is not definitely visualized. No focal pericecal inflammatory changes are evident. Pelvis: The urinary bladder is decompressed by Tran catheter. No pelvic mass is seen. Peritoneum/Retroperitoneum: Small amount of free fluid in the pelvic cavity. No free air or focal fluid collection. No abnormal lymph node. Normal abdominal aortic caliber. Moderate calcific atherosclerosis. Bones/Soft Tissues: No acute osseous abnormality. Diffuse anasarca. Moderate degenerative changes in the lumbar spine. 1. Distended, contrast filled stomach with reflux of contrast into the esophagus. No enteric contrast is seen distal to the pylorus suggesting delayed gastric emptying in the setting of ileus. 2. New moderate layering bilateral pleural effusions with bilateral lower lobe atelectasis. 3. Small amount of nonspecific free fluid in the pelvic cavity. 4. Anasarca. 5. Cholelithiasis. Xr Chest (single View Frontal)    Result Date: 4/13/2019  EXAMINATION: SINGLE XRAY VIEW OF THE CHEST 4/13/2019 7:18 am COMPARISON: April 12, 2019 HISTORY: ORDERING SYSTEM PROVIDED HISTORY: dyspnea TECHNOLOGIST PROVIDED HISTORY: dyspnea Ordering Physician Provided Reason for Exam: dyspnea Acuity: Acute Type of Exam: Subsequent/Follow-up Additional signs and symptoms: dyspnea FINDINGS: A right IJ catheter is seen with its tip terminating at the superior cavoatrial junction. The left chest wall pacemaker and leads are stable. The cardiomediastinal silhouette is stable. There is interval increased opacity at the right lung base, may be related to atelectasis versus pneumonia. There is small atelectasis and mild pleural effusion at the left lung base. There is no pneumothorax. There is no acute osseous abnormality. Interval increased opacity at the right lung base, may be related to atelectasis versus pneumonia.  Small atelectasis and mild pleural effusion at the left lung, new since the prior not optimally profiled but no significant arthritic changes are apparent. Left tib-fib, three views: There is evidence of a remote healed distal tibia fracture. No acute fracture or dislocation is seen. No focal soft tissue abnormality. No acute osseous abnormality of the left femur. No acute osseous abnormality of the left knee. No acute osseous abnormality of the left tib-fib. Healed remote distal tibia fracture. Marked osteopenia, likely due to disuse. Xr Abdomen (kub) (single Ap View)    Result Date: 4/14/2019  EXAMINATION: SINGLE SUPINE XRAY VIEW(S) OF THE ABDOMEN 4/14/2019 7:39 am COMPARISON: CT abdomen and pelvis  film from 11 April 2019 HISTORY: [address]: Abd Distention TECHNOLOGIST PROVIDED HISTORY: Abd Distention Ordering Physician Provided Reason for Exam: Abdominal distention. Pt was moving around in the bed. Best films at present time. Acuity: Acute Type of Exam: Initial Additional signs and symptoms: Abdominal distention. Pt was moving around in the bed. Best films at present time. FINDINGS: Portable view time stamped at 748 hours demonstrates an intestinal tube terminating in the midportion of a gaseous Spike dilated stomach. Densities are present over the stomach likely medication. Bipolar pacemaker is in situ with intact leads. Heart size is top-normal, stable. Gaseous distension of the stomach and loop of bowel in the upper mid abdomen is noted but there is gas and fecal material in the rectum. Gastric outlet obstruction or proximal small bowel partial obstruction is suspected. No free air is noted. Midline city is present over the pelvis likely a monitor or CT small bore catheter. Vascular calcification is present in the pelvis. Persistent preferential gaseous distension of the stomach although there is some gas in the upper abdomen and gas and fecal material present in the rectosigmoid. Findings suggest gastric outlet obstruction.      Ct Abdomen proximal iliac arteries. Shotty lymph nodes are present around the aorta in the upper abdomen. No mesenteric adenopathy is noted. Bones/Soft Tissues: Degenerative changes are present in the hips and lower lumbar facets. Estimated biologic radiation dose for this procedure:258.77 mGy/cm2.     1. Dilatation of the thoracic esophagus filled with fluid. No obstructive process is noted. No adjacent enlarged lymph nodes. 2. Trace pleural fluid. 3. Marked distention of the stomach. This appears to extend to the pylorus. Partial gastric outlet obstruction is not excluded. 4. Bilateral dilated ureters without obstructing calculi. Urinary bladder is markedly distended with bladder wall air suggesting emphysematous cystitis. Dilatation of the ureters may be related to reflux or infection. 5. Atherosclerotic disease. 6. Other findings as above. Critical results were called by Dr. Tracie Hwang MD to [address] on 4/11/2019 at 17:37. Xr Chest Portable    Result Date: 4/16/2019  EXAMINATION: SINGLE XRAY VIEW OF THE CHEST 4/16/2019 6:54 am COMPARISON: 04/15/2019, 610 hours HISTORY: ORDERING SYSTEM PROVIDED HISTORY: ETT placement TECHNOLOGIST PROVIDED HISTORY: ETT placement Ordering Physician Provided Reason for Exam: on vent Acuity: Acute Type of Exam: Initial 57-year-old female on ventilator; check endotracheal tube placement FINDINGS: Portable AP upright view of the chest. Endotracheal tube distal tip overlying the mid trachea approximately 4.1 cm above the level of the ron. Enteric tube traverses the GE junction with distal tip excluded from the field of view. Left subclavian approach cardiac pacemaker device distal lead tips relatively stable in position. Right internal jugular approach central venous catheter distal tip overlying the high right atrium, stable. Cardiac monitor leads overlie the chest. Atherosclerotic calcification of the thoracic aorta. Slight stable volume loss of the left hemithorax.   No

## 2019-05-02 NOTE — PROGRESS NOTES
Physical Therapy  DATE: 2019    NAME: Winston Martinez  MRN: [id number]   : 1950    Patient not seen this date for Physical Therapy due to:  [] Blood transfusion in progress  [] Hemodialysis  [x]  Patient Declined, but \"promises\" to work with PT on 5/3. VICKY Palencia notified of refusal.   [] Spine Precautions   [] Strict Bedrest  [] Surgery/ Procedure  [] Testing      [] Other        [] PT being discontinued at this time. Patient independent. No further needs. [] PT being discontinued at this time as the patient has been transferred to palliative care. No further needs.     Greg Thapa, PTA

## 2019-05-02 NOTE — PLAN OF CARE
Problem: Falls - Risk of:  Goal: Will remain free from falls  Description  Will remain free from falls  5/2/2019 1551 by Isaak Rivera RN  Outcome: Met This Shift  5/2/2019 0433 by Ramirez Modi RN  Outcome: Ongoing  Goal: Absence of physical injury  Description  Absence of physical injury  5/2/2019 1551 by Isaak Rivera RN  Outcome: Met This Shift  5/2/2019 0433 by Ramirez Modi RN  Outcome: Ongoing     Problem: Infection, Septic Shock:  Goal: Will show no infection signs and symptoms  Description  Will show no infection signs and symptoms  5/2/2019 1551 by Isaak Rivera RN  Outcome: Met This Shift  5/2/2019 0433 by Ramirez Modi RN  Outcome: Ongoing     Problem: Tissue Perfusion, Altered:  Goal: Circulatory function within specified parameters  Description  Circulatory function within specified parameters  5/2/2019 1551 by Isaak Rivera RN  Outcome: Met This Shift  5/2/2019 0433 by Ramirez Modi RN  Outcome: Ongoing     Problem: Risk for Impaired Skin Integrity  Goal: Tissue integrity - skin and mucous membranes  Description  Structural intactness and normal physiological function of skin and  mucous membranes.   5/2/2019 1551 by Isaak Rivera RN  Outcome: Ongoing  5/2/2019 0433 by Ramirez Modi RN  Outcome: Ongoing     Problem: Pain:  Goal: Pain level will decrease  Description  Pain level will decrease  5/2/2019 1551 by Isaak Rivera RN  Outcome: Ongoing  5/2/2019 0433 by Ramirez Modi RN  Outcome: Ongoing  Goal: Control of acute pain  Description  Control of acute pain  5/2/2019 1551 by Isaak Rivera RN  Outcome: Ongoing  5/2/2019 0433 by Ramirez Modi RN  Outcome: Ongoing  Goal: Control of chronic pain  Description  Control of chronic pain  5/2/2019 0433 by Ramirez Modi RN  Outcome: Ongoing     Problem: Nutrition  Goal: Optimal nutrition therapy  Description       5/2/2019 1551 by Isaak Rivera RN  Outcome: Ongoing  5/2/2019 0433 by Ramirez Modi RN  Outcome: Ongoing       RN continued to encourage patient to eat regular meals this shift. Patient calls out appropriately. Patient remains free of falls. Patient turned every 2 hours and kimmy care is provided. Wound care able to redress patient's wounds.

## 2019-05-02 NOTE — PROGRESS NOTES
Page has been sent to Dr. Merlin Decant regarding patient consult at 2:06PM waiting to receive a call back.   Noted

## 2019-05-02 NOTE — PROGRESS NOTES
Pulmonary Progress Note  [facility]      Patient - Angelika Jay,  Age - 77 y.o.    - 1950      Room Number - 2123/2123-01   N -  [id number]   [facility]  Date of Admission -  2019  2:48 PM  Consulting Britney Lyons MD  Primary Care Physician - Erinn Tilley, DO     SUBJECTIVE   No distress  Not orthopneic    OBJECTIVE   VITALS    height is 5' (1.524 m) and weight is 108 lb (49 kg). Her oral temperature is 97.6 °F (36.4 °C). Her blood pressure is 117/58 (abnormal) and her pulse is 89. Her respiration is 16 and oxygen saturation is 96%. Body mass index is 21.09 kg/m². Temperature Range: Temp: 97.6 °F (36.4 °C) Temp  Av.9 °F (36.6 °C)  Min: 97.3 °F (36.3 °C)  Max: 99 °F (37.2 °C)  BP Range:  Systolic (08IXR), AYW:330 , Min:102 , MRB:654     Diastolic (25EAZ), DVX:34, Min:58, Max:97    Pulse Range: Pulse  Av  Min: 83  Max: 99  Respiration Range: Resp  Av.5  Min: 16  Max: 17  Current Pulse Ox[de-identified]  SpO2: 96 %  24HR Pulse Ox Range:  SpO2  Av.3 %  Min: 93 %  Max: 99 %  Oxygen Amount and Delivery: O2 Flow Rate (L/min): 1.5 L/min    Wt Readings from Last 3 Encounters:   19 108 lb (49 kg)   19 89 lb (40.4 kg)   19 94 lb 1.6 oz (42.7 kg)       I/O (24 Hours)    Intake/Output Summary (Last 24 hours) at 2019 1207  Last data filed at 2019 0100  Gross per 24 hour   Intake 922 ml   Output 650 ml   Net 272 ml       EXAM     General Appearance  Awake, alert, oriented, in no acute distress  HEENT - normocephalic, atraumatic. Neck - Supple,  trachea midline   Lungs - coarse no rhonchi  Heart Exam:PMI normal. No lifts, heaves, or thrills. RRR. No murmurs, clicks, gallops, or rubs  Abdomen Exam: Abdomen soft, non-tender.  BS normal  Extremity Exam: no edema    MEDS      mometasone-formoterol  2 puff Inhalation BID    pantoprazole  40 mg Oral QAM AC    metoprolol tartrate  12.5 mg Oral BID    INR   Recent Labs     05/02/19  0506   INR 1.3     PTT   Lab Results   Component Value Date    APTT 27.7 03/28/2012         RADIOLOGY     (See actual reports for details)    ASSESSMENT/PLAN     Patient Active Problem List   Diagnosis    Paraproteinemia    [facility] accident) ([facility] [address]    Abnormal computed tomography of cervical spine    Weight loss    Functional gait abnormality    Left knee pain    Knee pain, left    Acute pain of left knee    Sepsis due to urinary tract infection (HCC)    Cystitis    Emphysematous cystitis    Septic shock (HCC)    Leukemoid reaction    Aspiration pneumonia of right lower lobe due to vomit ([facility] [address]    MRSA carrier    Urinary retention    Abdominal distention    Elevated CEA    Elevated CA 19-9 level    Cholecystitis    CRP elevated    Elevated procalcitonin    Bandemia    Centrilobular emphysema (HCC)     CXR--- congestive changes are about the same  Wean prednisone to 10 mg daily    Electronically signed by Tan Momin MD on 5/2/2019 at 12:07 PM

## 2019-05-02 NOTE — PLAN OF CARE
Problem: Risk for Impaired Skin Integrity  Goal: Tissue integrity - skin and mucous membranes  Description  Structural intactness and normal physiological function of skin and  mucous membranes.   5/2/2019 0433 by Maggie Gaston RN  Outcome: Ongoing  5/1/2019 1811 by Christen Martinez RN  Outcome: Ongoing     Problem: Falls - Risk of:  Goal: Will remain free from falls  Description  Will remain free from falls  5/2/2019 0433 by Maggie Gaston RN  Outcome: Ongoing  5/1/2019 1811 by Christen Martinez RN  Outcome: Ongoing  Goal: Absence of physical injury  Description  Absence of physical injury  5/2/2019 0433 by Maggie Gaston RN  Outcome: Ongoing  5/1/2019 1811 by Christen Martinez RN  Outcome: Ongoing     Problem: Infection, Septic Shock:  Goal: Will show no infection signs and symptoms  Description  Will show no infection signs and symptoms  5/2/2019 0433 by Maggie Gaston RN  Outcome: Ongoing  5/1/2019 1811 by Christen Martinez RN  Outcome: Ongoing     Problem: Tissue Perfusion, Altered:  Goal: Circulatory function within specified parameters  Description  Circulatory function within specified parameters  5/2/2019 0433 by Maggie Gaston RN  Outcome: Ongoing  5/1/2019 1811 by Christen Martinez RN  Outcome: Ongoing

## 2019-05-02 NOTE — PROGRESS NOTES
[facility] Nurse  Consult Note       NAME:  Keny Davis [facility]  MEDICAL RECORD NUMBER:  [id number]  AGE: 77 y.o. GENDER: female  : 1950  TODAY'S DATE:  2019    Subjective:     Reason for Wound Carine Hemanth Care and Assessment: Pressure injury prevention      Alverto Stark is a 77 y.o. female referred by:   [] Physician  [] Nursing  [] Other:     Risk factors[de-identified] chronic pressure, decreased mobility, shear force, smoking, decreased tissue oxygenation, anticoagulation therapy, malnutrition, incontinence of stool and incontinence of urine        PAST MEDICAL HISTORY        Diagnosis Date    Abnormal computed tomography of cervical spine     sclerotic bone appearance     CVA (cerebral vascular accident) ([address]     left  side weakness    GERD (gastroesophageal reflux disease)     Hypertension     Paraproteinemia     Weight loss        PAST SURGICAL HISTORY    Past Surgical History:   Procedure Laterality Date    INTUBATION  2019         PACEMAKER PLACEMENT  2011    Pacemaker is Medtronic Revo (compatible). Leads placed in  are NOT MRI compatible. Placed at [facility]. V's per Dr. Daniela Oneal can not have an MRI.  UPPER GASTROINTESTINAL ENDOSCOPY N/A 2019    EGD ESOPHAGOGASTRODUODENOSCOPY @ BEDSIDE  ICU  performed by Ban Butcher MD at [facility]    History reviewed. No pertinent family history.     SOCIAL HISTORY    Social History     Tobacco Use    Smoking status: Current Some Day Smoker     Packs/day: 1.00     Years: 30.00     Pack years: 30.00    Smokeless tobacco: Never Used   Substance Use Topics    Alcohol use: Not Currently     Alcohol/week: 16.8 oz     Types: 28 Glasses of wine per week    Drug use: No       ALLERGIES    Allergies   Allergen Reactions    Penicillins Shortness Of Breath and Rash    Amoxicillin            Objective:      /70   Pulse 99   Temp 97.6 °F (36.4 °C) (Oral)   Resp 16   Ht 5' (1.524 m)   Wt 108 lb (49 kg) SpO2 96%   BMI 21.09 kg/m²   Collin Risk Score: Collin Scale Score: 11    LABS    CBC:   Lab Results   Component Value Date    WBC 7.2 05/02/2019    RBC 2.46 05/02/2019    RBC 3.66 05/23/2012    HGB 7.2 05/02/2019     CMP:  Albumin:    Lab Results   Component Value Date    LABALBU 1.5 05/02/2019    LABALBU 4.6 05/17/2012     PT/INR:    Lab Results   Component Value Date    PROTIME 15.9 05/02/2019    PROTIME 10.9 03/28/2012    INR 1.3 05/02/2019     HgBA1c:    Lab Results   Component Value Date    LABA1C 5.3 04/12/2019     PTT: No components found for: LABPTT      Assessment:     Patient Active Problem List   Diagnosis    Paraproteinemia    [facility]) [facility]    Abnormal computed tomography of cervical spine    Weight loss    Functional gait abnormality    Left knee pain    Knee pain, left    Acute pain of left knee    Sepsis due to urinary tract infection (HCC)    Cystitis    Emphysematous cystitis    Septic shock (HCC)    Leukemoid reaction    Aspiration pneumonia of right lower lobe due to vomit ([facility]    MRSA carrier    Urinary retention    Abdominal distention    Elevated CEA    Elevated CA 19-9 level    Cholecystitis    CRP elevated    Elevated procalcitonin    Bandemia    Centrilobular emphysema (HCC)       Measurements:  Wound 04/11/19 Coccyx Mid (Active)   Wound Deep tissue/Injury 5/2/2019 10:03 AM   Dressing Status Other (Comment) 5/2/2019 10:03 AM   Dressing Changed Changed/New 5/2/2019 10:03 AM   Dressing/Treatment Foam 5/2/2019  1:00 AM   Wound Cleansed Other (Comment) 4/17/2019  8:00 PM   Wound Assessment Red 5/2/2019  1:00 AM   Drainage Amount None 5/2/2019  1:00 AM   Beverly-wound Assessment Clean;Dry; Intact 4/22/2019  4:25 AM   Number of days: 20       Wound 04/15/19 Thigh Left;Posterior (Active)   Wound Image   5/2/2019 10:03 AM   Wound Deep tissue/Injury 5/2/2019 10:03 AM   Dressing Status Intact 5/2/2019 10:03 AM   Dressing Changed Changed/New 5/2/2019 10:03 AM   Dressing/Treatment Foam 5/2/2019 10:03 AM   Wound Cleansed Rinsed/Irrigated with saline 5/2/2019  9:39 AM   Dressing Change Due 04/18/19 4/15/2019 11:37 AM   Wound Length (cm) 1 cm 4/15/2019 11:37 AM   Wound Width (cm) 14 cm 4/15/2019 11:37 AM   Wound Surface Area (cm^2) 14 cm^2 4/15/2019 11:37 AM   Wound Assessment Red 5/2/2019  1:00 AM   Drainage Amount None 5/2/2019 10:03 AM   Odor None 4/23/2019 10:06 AM   Beverly-wound Assessment Edema 5/2/2019 10:03 AM   Number of days: 16       Wound 05/02/19 Leg Left; Lower under distal lateral cast (Active)   Wound Image   5/2/2019 10:03 AM   Wound Other 5/2/2019 10:03 AM   Dressing Changed Other (Comment) 5/2/2019 10:03 AM   Dressing/Treatment Foam 5/2/2019 10:03 AM   Beverly-wound Assessment Edema 5/2/2019 10:03 AM   Number of days: 0       Response to treatment:  Well tolerated by patient. Pain Assessment:  Severity:  6 / 10  Quality of pain: sharp, aching  Wound Pain Timing/Severity: intermittent  Premedicated: No      Plan:     Plan of Care: Wound 04/11/19 Coccyx Mid-Dressing/Treatment: Foam  Wound 04/15/19 Thigh Left;Posterior-Dressing/Treatment: Foam  Wound 05/02/19 Leg Left; Lower under distal lateral cast-Dressing/Treatment: Foam  -Coccyx/sacral DTI left open to air as the patient is currently having lose stools and the dressing will become saturated. Zinc paste used for protection.   -Turn every 2 hours  -Float heels of of bed with pillows under calves with consideration for proper positioning with cast to avoid pressure injury.  -Routine incontinence care with Frankie barrier cloths and zinc oxide cream. Apply zinc oxide cream BID and prn incontinence.   -Covidien moisture wicking under pads vs cloth backed briefs  -Static air overlay. Check inflation each shift by sliding hand under the air overlay. Feel for the patient's heaviest/ most dependant body part. Ideally 1/2 to 1\" of air will be between your hand and the patient's body. Add air prn.     Specialty Bed Required : Yes   [] Low Air Loss   [x] Pressure Redistribution  [] Fluid Immersion  [] Bariatric  [] Total Pressure Relief  [] Other:     Discharge Plan:  Placement for patient upon discharge: skilled nursing   Hospice Care: No   Patient appropriate for Outpatient [address] [place] [address] Road: No    Patient/Caregiver Teaching:  Level of patientunderstanding able to:      [] Indicates understanding       [x] Needs reinforcement  [] Unsuccessful      [] Verbal Understanding  [] Demonstrated understanding       [] No evidence of learning  [] Refused teaching         [] N/A       Electronically signed by Pamella Ovalles RN on  5/2/2019 at 10:09 AM

## 2019-05-02 NOTE — CARE COORDINATION
ONGOING DISCHARGE PLAN:    LSW following for placement. Pramod Slot is going to complete an appeal to see if patient an go to [facility] Discharge. Patient  Remains on IV Lasix, TPN, Iv fluids. Will continue to follow for additional discharge needs.     Electronically signed by Preeti Baxter RN on 5/2/2019 at 3:07 PM

## 2019-05-02 NOTE — CONSULTS
Inpatient consult to GI  Consult performed by: Romulo Gil MD  Consult ordered by: Ashlyn Simmons MD           GI Consult Note:    Name: Libby Issa  MRN: [id number]     Acct: [de-identified]  Room: 2123/2123-01    Admit Date: 4/11/2019  PCP: Barbara Holguin DO    Physician Requesting Consult: Iman Amos MD     Reason for Consult:  Abdominal pains  Complex gallbladder issues  Malnutrition  Recent rectal bleeding  Anemia  Abnormal labs    Chief Complaint:     Chief Complaint   Patient presents with    Abdominal Pain       History Obtained From:     Patient and EMR    History of Present Illness:      Libby Issa is a  77 y.o.  female who presents with Abdominal Pain    This patient has been admitted in the hospital for almost a month has been evaluated in the GI in the past abdominal pains  She has been reconsulted again for some rectal bleeding started last night  She has complex gallbladder issues  Has history for chronic intermittent abdominal pains  Multiple physicians specialties are following her  Records were reviewed with her  Has some anemia  Currently no active bleeding  Has some nausea but no vomiting  Symptoms:  Onset:  Location:  abdomen  Duration:  month(s)  Severity:  moderate  Quality:  intermittent      Past Medical History:     Past Medical History:   Diagnosis Date    Abnormal computed tomography of cervical spine     sclerotic bone appearance     CVA (cerebral vascular accident) ([address]     left  side weakness    GERD (gastroesophageal reflux disease)     Hypertension     Paraproteinemia     Weight loss         Past Surgical History:     Past Surgical History:   Procedure Laterality Date    INTUBATION  4/14/2019         PACEMAKER PLACEMENT  07/2011    Pacemaker is Medtronic Revo (compatible). Leads placed in 1995 are NOT MRI compatible. Placed at [facility]. V's per Dr. Felice Aviles can not have an MRI.     UPPER GASTROINTESTINAL ENDOSCOPY N/A 4/16/2019    EGD ESOPHAGOGASTRODUODENOSCOPY @ BEDSIDE  ICU 2002 performed by Saul Ashby MD at [address]        Medications Prior to Admission:       Prior to Admission medications    Medication Sig Start Date End Date Taking? Authorizing Provider   oxyCODONE-acetaminophen (PERCOCET) 5-325 MG per tablet Take 1 tablet by mouth every 6 hours as needed for Pain. Yes Historical Provider, MD   senna-docusate (PERICOLACE) 8.6-50 MG per tablet Take 1 tablet by mouth 2 times daily   Yes Historical Provider, MD   budesonide-formoterol (SYMBICORT) 160-4.5 MCG/ACT AERO Inhale 2 puffs into the lungs 2 times daily   Yes Historical Provider, MD   sucralfate (CARAFATE) 1 GM/10ML suspension Take 1 g by mouth 4 times daily    Yes Historical Provider, MD   traZODone (DESYREL) 50 MG tablet Take 50 mg by mouth nightly   Yes Historical Provider, MD   tiZANidine (ZANAFLEX) 2 MG tablet Take 2 mg by mouth every 8 hours as needed (left knee)    Yes Historical Provider, MD   aspirin 81 MG tablet Take 81 mg by mouth daily   Yes Historical Provider, MD   clopidogrel (PLAVIX) 75 MG tablet TAKE 1 TABLET DAILY 3/31/15  Yes Brandy Winchester DO   DOCQLACE 100 MG capsule TAKE 1 CAPSULE BY MOUTH IN THE MORNING & IN THE EVENING -[address] [place] [address] TAKING THIS MEDICINE 3/31/15  Yes Brandy Winchester DO   amLODIPine (NORVASC) 10 MG tablet Take 10 mg by mouth daily. Yes Historical Provider, MD   LORazepam (ATIVAN) 0.5 MG tablet Take 0.5 mg by mouth every 6 hours as needed for Anxiety. Yes Historical Provider, MD   therapeutic multivitamin-minerals (THERAGRAN-M) tablet Take 1 tablet by mouth daily. Yes Historical Provider, MD   omeprazole (PRILOSEC) 20 MG capsule Take 20 mg by mouth daily. Yes Historical Provider, MD   venlafaxine (EFFEXOR) 37.5 MG tablet Take 37.5 mg by mouth 3 times daily. Yes Historical Provider, MD   Misc. Devices [facility] [address] wheelchair with left fupper extremity support  Dx: stroke with left hemiparesis.  7/24/17 regular rhythm, no murmurs  Abdomen - soft, positive tenderness, nondistended, bowel sounds present   Neurological - normal speech, no focal findings or movement disorder noted, cranial nerves II through XII grossly intact  Extremities - no pedal edema or calf pain with palpation  Skin - positive rashes, or induration noted      Data:   CBC:   Lab Results   Component Value Date    WBC 7.2 05/02/2019    RBC 2.46 05/02/2019    RBC 3.66 05/23/2012    HGB 7.2 05/02/2019    HCT 22.0 05/02/2019    MCV 89.5 05/02/2019    MCH 29.2 05/02/2019    MCHC 32.7 05/02/2019    RDW 15.9 05/02/2019     05/02/2019     05/23/2012    MPV 9.3 05/02/2019     CBC with Differential:    Lab Results   Component Value Date    WBC 7.2 05/02/2019    RBC 2.46 05/02/2019    RBC 3.66 05/23/2012    HGB 7.2 05/02/2019    HCT 22.0 05/02/2019     05/02/2019     05/23/2012    MCV 89.5 05/02/2019    MCH 29.2 05/02/2019    MCHC 32.7 05/02/2019    RDW 15.9 05/02/2019    METASPCT 2 04/11/2019    LYMPHOPCT 10 05/02/2019    MONOPCT 3 05/02/2019    BASOPCT 0 05/02/2019    MONOSABS 0.20 05/02/2019    LYMPHSABS 0.70 05/02/2019    EOSABS 0.10 05/02/2019    BASOSABS 0.00 05/02/2019    DIFFTYPE NOT REPORTED 05/02/2019     Hemoglobin/Hematocrit:    Lab Results   Component Value Date    HGB 7.2 05/02/2019    HCT 22.0 05/02/2019     CMP:    Lab Results   Component Value Date     05/02/2019    K 4.2 05/02/2019     05/02/2019    CO2 26 05/02/2019    BUN 18 05/02/2019    CREATININE <0.40 05/02/2019    GFRAA CANNOT BE CALCULATED 05/02/2019    LABGLOM CANNOT BE CALCULATED 05/02/2019    GLUCOSE 120 05/02/2019    GLUCOSE 87 05/21/2012    PROT 4.5 05/02/2019    LABALBU 1.5 05/02/2019    LABALBU 4.6 05/17/2012    CALCIUM 7.5 05/02/2019    BILITOT 0.34 05/02/2019    ALKPHOS 97 05/02/2019    AST 20 05/02/2019    ALT 13 05/02/2019     BMP:    Lab Results   Component Value Date     05/02/2019    K 4.2 05/02/2019     05/02/2019 CO2 26 05/02/2019    BUN 18 05/02/2019    LABALBU 1.5 05/02/2019    LABALBU 4.6 05/17/2012    CREATININE <0.40 05/02/2019    CALCIUM 7.5 05/02/2019    GFRAA CANNOT BE CALCULATED 05/02/2019    LABGLOM CANNOT BE CALCULATED 05/02/2019    GLUCOSE 120 05/02/2019    GLUCOSE 87 05/21/2012     PT/INR:    Lab Results   Component Value Date    PROTIME 15.9 05/02/2019    PROTIME 10.9 03/28/2012    INR 1.3 05/02/2019     PTT:    Lab Results   Component Value Date    APTT 27.7 03/28/2012   [APTT}    Assesment:     Primary Problem  Sepsis due to urinary tract infection [facility])    Active Hospital Problems    Diagnosis Date Noted    Centrilobular emphysema ([facility] [J43.2] 04/30/2019    CRP elevated [R79.82]     Elevated procalcitonin [R79.89]     Bandemia [D72.825]     Cholecystitis [K81.9]     Abdominal distention [R14.0]     Elevated CEA [R97.0]     Elevated CA 19-9 level [R97.8]     Urinary retention [R33.9]     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]     Septic shock (HCC) [A41.9, R65.21]     Leukemoid reaction [D72.823]     Aspiration pneumonia of right lower lobe due to vomit ([facility] [J69.0]     MRSA carrier [Z22.322]     Sepsis due to urinary tract infection ([facility] [A41.9, N39.0] 04/11/2019    Cystitis [N30.90] 04/11/2019     Abdominal pains  Complex gallbladder issues  Malnutrition  Recent rectal bleeding  Anemia  Abnormal labs    Plan:     1. Follow-up hemoglobin  2. If bleeding continues may need: Examination  3. PPI  4. Supportive symptomatic care  5. Reevaluate in the morning  6. Explained to the patient discussed the nursing staff on the floor        Thank you for allowing me to participate in the care of your patient. Please feel free to contact me with any questions or concerns.      Electronically signed by Paola Valera MD on 5/2/2019 at 7:14 PM     Copy sent to Dr. Yvette Stallings DO

## 2019-05-02 NOTE — FLOWSHEET NOTE
05/02/19 1238   Encounter Summary   Services provided to: Patient   Referral/Consult From: Rounding   Complexity of Encounter Low   Length of Encounter 15 minutes   Spiritual/Protestant   Type Spiritual support   Assessment Sleeping   Intervention Prayer   Outcome Did not respond

## 2019-05-02 NOTE — PROGRESS NOTES
[address]   OCCUPATIONAL THERAPY MISSED TREATMENT NOTE   INPATIENT   Date: 19  Patient Name: Ned Noriega       Room: 7711/2110-78  MRN: [id number]   Account #: [de-identified]    : 1950  (77 y.o.)  Gender: female   Referring Practitioner: Moriah Gonzales MD  Diagnosis: Sepsis due to UTI             REASON FOR MISSED TREATMENT:  Patient refusal   -    Discussed importance of OT patient stated \"come back tomorrow\"       Felicity Lipoma, KORINA

## 2019-05-03 ENCOUNTER — APPOINTMENT (OUTPATIENT)
Dept: NUCLEAR MEDICINE | Age: 69
DRG: 853 | End: 2019-05-03
Payer: COMMERCIAL

## 2019-05-03 PROBLEM — K92.2 GI BLEED: Status: ACTIVE | Noted: 2019-05-03

## 2019-05-03 LAB
ABSOLUTE BANDS #: 0.67 K/UL (ref 0–1)
ABSOLUTE EOS #: 0.07 K/UL (ref 0–0.4)
ABSOLUTE IMMATURE GRANULOCYTE: ABNORMAL K/UL (ref 0–0.3)
ABSOLUTE LYMPH #: 0.67 K/UL (ref 1–4.8)
ABSOLUTE MONO #: 0.27 K/UL (ref 0.1–1.3)
ALBUMIN SERPL-MCNC: 1.5 G/DL (ref 3.5–5.2)
ALBUMIN/GLOBULIN RATIO: ABNORMAL (ref 1–2.5)
ALP BLD-CCNC: 103 U/L (ref 35–104)
ALT SERPL-CCNC: 13 U/L (ref 5–33)
ANION GAP SERPL CALCULATED.3IONS-SCNC: 8 MMOL/L (ref 9–17)
AST SERPL-CCNC: 16 U/L
BANDS: 10 % (ref 0–10)
BASOPHILS # BLD: 0 % (ref 0–2)
BASOPHILS ABSOLUTE: 0 K/UL (ref 0–0.2)
BILIRUB SERPL-MCNC: 0.34 MG/DL (ref 0.3–1.2)
BUN BLDV-MCNC: 20 MG/DL (ref 8–23)
BUN/CREAT BLD: ABNORMAL (ref 9–20)
C-REACTIVE PROTEIN: 98.6 MG/L (ref 0–5)
CALCIUM SERPL-MCNC: 7.2 MG/DL (ref 8.6–10.4)
CHLORIDE BLD-SCNC: 99 MMOL/L (ref 98–107)
CO2: 28 MMOL/L (ref 20–31)
CREAT SERPL-MCNC: <0.4 MG/DL (ref 0.5–0.9)
DIFFERENTIAL TYPE: ABNORMAL
EOSINOPHILS RELATIVE PERCENT: 1 % (ref 0–4)
GFR AFRICAN AMERICAN: ABNORMAL ML/MIN
GFR NON-AFRICAN AMERICAN: ABNORMAL ML/MIN
GFR SERPL CREATININE-BSD FRML MDRD: ABNORMAL ML/MIN/{1.73_M2}
GFR SERPL CREATININE-BSD FRML MDRD: ABNORMAL ML/MIN/{1.73_M2}
GLUCOSE BLD-MCNC: 101 MG/DL (ref 65–105)
GLUCOSE BLD-MCNC: 108 MG/DL (ref 65–105)
GLUCOSE BLD-MCNC: 113 MG/DL (ref 65–105)
GLUCOSE BLD-MCNC: 115 MG/DL (ref 70–99)
GLUCOSE BLD-MCNC: 119 MG/DL (ref 65–105)
GLUCOSE BLD-MCNC: 140 MG/DL (ref 65–105)
HCT VFR BLD CALC: 19.5 % (ref 36–46)
HCT VFR BLD CALC: 26.1 % (ref 36–46)
HCT VFR BLD CALC: 27.8 % (ref 36–46)
HEMOGLOBIN: 6.4 G/DL (ref 12–16)
HEMOGLOBIN: 8.4 G/DL (ref 12–16)
HEMOGLOBIN: 9.3 G/DL (ref 12–16)
IMMATURE GRANULOCYTES: ABNORMAL %
INR BLD: 1.2
LYMPHOCYTES # BLD: 10 % (ref 24–44)
MAGNESIUM: 1.8 MG/DL (ref 1.6–2.6)
MCH RBC QN AUTO: 28.7 PG (ref 26–34)
MCHC RBC AUTO-ENTMCNC: 32.9 G/DL (ref 31–37)
MCV RBC AUTO: 87.3 FL (ref 80–100)
METAMYELOCYTES ABSOLUTE COUNT: 0.2 K/UL
METAMYELOCYTES: 3 %
MONOCYTES # BLD: 4 % (ref 1–7)
MORPHOLOGY: ABNORMAL
NRBC AUTOMATED: ABNORMAL PER 100 WBC
PDW BLD-RTO: 15.6 % (ref 11.5–14.9)
PHOSPHORUS: 3.3 MG/DL (ref 2.6–4.5)
PLATELET # BLD: 139 K/UL (ref 150–450)
PLATELET ESTIMATE: ABNORMAL
PMV BLD AUTO: 9.7 FL (ref 6–12)
POTASSIUM SERPL-SCNC: 3.4 MMOL/L (ref 3.7–5.3)
PREALBUMIN: 20 MG/DL (ref 20–40)
PROCALCITONIN: 0.08 NG/ML
PROTHROMBIN TIME: 15.3 SEC (ref 11.8–14.6)
RBC # BLD: 2.24 M/UL (ref 4–5.2)
RBC # BLD: ABNORMAL 10*6/UL
SEDIMENTATION RATE, ERYTHROCYTE: 80 MM (ref 0–20)
SEG NEUTROPHILS: 72 % (ref 36–66)
SEGMENTED NEUTROPHILS ABSOLUTE COUNT: 4.82 K/UL (ref 1.3–9.1)
SODIUM BLD-SCNC: 135 MMOL/L (ref 135–144)
TOTAL PROTEIN: 4.3 G/DL (ref 6.4–8.3)
WBC # BLD: 6.7 K/UL (ref 3.5–11)
WBC # BLD: ABNORMAL 10*3/UL

## 2019-05-03 PROCEDURE — 86901 BLOOD TYPING SEROLOGIC RH(D): CPT

## 2019-05-03 PROCEDURE — 6370000000 HC RX 637 (ALT 250 FOR IP): Performed by: STUDENT IN AN ORGANIZED HEALTH CARE EDUCATION/TRAINING PROGRAM

## 2019-05-03 PROCEDURE — 86140 C-REACTIVE PROTEIN: CPT

## 2019-05-03 PROCEDURE — 84134 ASSAY OF PREALBUMIN: CPT

## 2019-05-03 PROCEDURE — 94762 N-INVAS EAR/PLS OXIMTRY CONT: CPT

## 2019-05-03 PROCEDURE — 85018 HEMOGLOBIN: CPT

## 2019-05-03 PROCEDURE — 6370000000 HC RX 637 (ALT 250 FOR IP): Performed by: INTERNAL MEDICINE

## 2019-05-03 PROCEDURE — 2580000003 HC RX 258: Performed by: INTERNAL MEDICINE

## 2019-05-03 PROCEDURE — 86900 BLOOD TYPING SEROLOGIC ABO: CPT

## 2019-05-03 PROCEDURE — 99233 SBSQ HOSP IP/OBS HIGH 50: CPT | Performed by: INTERNAL MEDICINE

## 2019-05-03 PROCEDURE — 86920 COMPATIBILITY TEST SPIN: CPT

## 2019-05-03 PROCEDURE — 85025 COMPLETE CBC W/AUTO DIFF WBC: CPT

## 2019-05-03 PROCEDURE — P9016 RBC LEUKOCYTES REDUCED: HCPCS

## 2019-05-03 PROCEDURE — 2060000000 HC ICU INTERMEDIATE R&B

## 2019-05-03 PROCEDURE — 6360000002 HC RX W HCPCS: Performed by: INTERNAL MEDICINE

## 2019-05-03 PROCEDURE — 85610 PROTHROMBIN TIME: CPT

## 2019-05-03 PROCEDURE — 2500000003 HC RX 250 WO HCPCS: Performed by: SURGERY

## 2019-05-03 PROCEDURE — 85651 RBC SED RATE NONAUTOMATED: CPT

## 2019-05-03 PROCEDURE — 36415 COLL VENOUS BLD VENIPUNCTURE: CPT

## 2019-05-03 PROCEDURE — 2500000003 HC RX 250 WO HCPCS: Performed by: STUDENT IN AN ORGANIZED HEALTH CARE EDUCATION/TRAINING PROGRAM

## 2019-05-03 PROCEDURE — 86850 RBC ANTIBODY SCREEN: CPT

## 2019-05-03 PROCEDURE — 84145 PROCALCITONIN (PCT): CPT

## 2019-05-03 PROCEDURE — 83735 ASSAY OF MAGNESIUM: CPT

## 2019-05-03 PROCEDURE — 99232 SBSQ HOSP IP/OBS MODERATE 35: CPT | Performed by: INTERNAL MEDICINE

## 2019-05-03 PROCEDURE — 80053 COMPREHEN METABOLIC PANEL: CPT

## 2019-05-03 PROCEDURE — 82947 ASSAY GLUCOSE BLOOD QUANT: CPT

## 2019-05-03 PROCEDURE — A9560 TC99M LABELED RBC: HCPCS | Performed by: INTERNAL MEDICINE

## 2019-05-03 PROCEDURE — 6360000002 HC RX W HCPCS: Performed by: STUDENT IN AN ORGANIZED HEALTH CARE EDUCATION/TRAINING PROGRAM

## 2019-05-03 PROCEDURE — 3430000000 HC RX DIAGNOSTIC RADIOPHARMACEUTICAL: Performed by: INTERNAL MEDICINE

## 2019-05-03 PROCEDURE — 78278 ACUTE GI BLOOD LOSS IMAGING: CPT

## 2019-05-03 PROCEDURE — 85014 HEMATOCRIT: CPT

## 2019-05-03 PROCEDURE — 84100 ASSAY OF PHOSPHORUS: CPT

## 2019-05-03 RX ORDER — SODIUM CHLORIDE 0.9 % (FLUSH) 0.9 %
10 SYRINGE (ML) INJECTION PRN
Status: DISCONTINUED | OUTPATIENT
Start: 2019-05-03 | End: 2019-05-15

## 2019-05-03 RX ORDER — LORAZEPAM 2 MG/ML
1 INJECTION INTRAMUSCULAR ONCE
Status: COMPLETED | OUTPATIENT
Start: 2019-05-03 | End: 2019-05-03

## 2019-05-03 RX ORDER — 0.9 % SODIUM CHLORIDE 0.9 %
250 INTRAVENOUS SOLUTION INTRAVENOUS ONCE
Status: DISCONTINUED | OUTPATIENT
Start: 2019-05-03 | End: 2019-05-10

## 2019-05-03 RX ADMIN — VENLAFAXINE 37.5 MG: 37.5 TABLET ORAL at 23:37

## 2019-05-03 RX ADMIN — VENLAFAXINE 37.5 MG: 37.5 TABLET ORAL at 17:40

## 2019-05-03 RX ADMIN — Medication 2 PUFF: at 10:26

## 2019-05-03 RX ADMIN — Medication 23.8 MILLICURIE: at 14:08

## 2019-05-03 RX ADMIN — PANTOPRAZOLE SODIUM 40 MG: 40 TABLET, DELAYED RELEASE ORAL at 05:47

## 2019-05-03 RX ADMIN — Medication 10 ML: at 20:26

## 2019-05-03 RX ADMIN — POTASSIUM CHLORIDE 40 MEQ: 1500 TABLET, EXTENDED RELEASE ORAL at 10:27

## 2019-05-03 RX ADMIN — CALCIUM GLUCONATE: 94 INJECTION, SOLUTION INTRAVENOUS at 17:51

## 2019-05-03 RX ADMIN — METOPROLOL TARTRATE 12.5 MG: 25 TABLET ORAL at 10:26

## 2019-05-03 RX ADMIN — FLUCONAZOLE 200 MG: 100 TABLET ORAL at 17:40

## 2019-05-03 RX ADMIN — FUROSEMIDE 40 MG: 10 INJECTION, SOLUTION INTRAMUSCULAR; INTRAVENOUS at 11:47

## 2019-05-03 RX ADMIN — METOCLOPRAMIDE 10 MG: 5 INJECTION, SOLUTION INTRAMUSCULAR; INTRAVENOUS at 02:58

## 2019-05-03 RX ADMIN — Medication 2 PUFF: at 20:25

## 2019-05-03 RX ADMIN — I.V. FAT EMULSION 100 ML: 20 EMULSION INTRAVENOUS at 17:49

## 2019-05-03 RX ADMIN — PREDNISONE 10 MG: 10 TABLET ORAL at 17:40

## 2019-05-03 RX ADMIN — OXYCODONE AND ACETAMINOPHEN 1 TABLET: 5; 325 TABLET ORAL at 17:41

## 2019-05-03 RX ADMIN — ASPIRIN 81 MG: 81 TABLET, COATED ORAL at 17:40

## 2019-05-03 RX ADMIN — METOPROLOL TARTRATE 12.5 MG: 25 TABLET ORAL at 23:37

## 2019-05-03 RX ADMIN — LORAZEPAM 1 MG: 2 INJECTION INTRAMUSCULAR; INTRAVENOUS at 13:29

## 2019-05-03 RX ADMIN — Medication 10 ML: at 14:08

## 2019-05-03 ASSESSMENT — ENCOUNTER SYMPTOMS
VOMITING: 0
DIARRHEA: 1
SHORTNESS OF BREATH: 0
NAUSEA: 0
ABDOMINAL PAIN: 0
CONSTIPATION: 0

## 2019-05-03 ASSESSMENT — PAIN DESCRIPTION - PAIN TYPE: TYPE: CHRONIC PAIN

## 2019-05-03 ASSESSMENT — PAIN SCALES - WONG BAKER: WONGBAKER_NUMERICALRESPONSE: 0

## 2019-05-03 ASSESSMENT — PAIN DESCRIPTION - LOCATION: LOCATION: GENERALIZED

## 2019-05-03 ASSESSMENT — PAIN DESCRIPTION - ONSET: ONSET: ON-GOING

## 2019-05-03 ASSESSMENT — PAIN DESCRIPTION - DESCRIPTORS: DESCRIPTORS: PATIENT UNABLE TO DESCRIBE

## 2019-05-03 ASSESSMENT — PAIN DESCRIPTION - FREQUENCY: FREQUENCY: CONTINUOUS

## 2019-05-03 ASSESSMENT — PAIN SCALES - GENERAL: PAINLEVEL_OUTOF10: 10

## 2019-05-03 NOTE — PROGRESS NOTES
[facility]    Date: 5/3/2019  Patient Name: Lisandra Barboza        : 1950       [] Pt Refusal           [x] Pt Unavailable due to:       Pt/ bed out of room at 2:17  Nabeel Chadwick  OTR/L Date: 5/3/2019

## 2019-05-03 NOTE — PROGRESS NOTES
Infectious disease Consult Note      Patient: Chloe Alexander  : 1950  Acct#:  [id number]     Date:  5/3/2019    Assessment:   Anemia/GI bleed followed by GI  Cholecystitis status post cholecystectomy tube  grew Candida non-albicans and one colony of ESBL Klebsiella on culture . Clinically improving     Ecoli Emphysematous cystitis treated     Aspiration pneumonia of right lower lobe treated    Sepsis /Septic shock     Yeast growth on sputum culture suspect contamination     Leukocytosis resolved    Acute hypoxic resp failure resolved    MRSA carrier    Urinary retention     Anemia      PCN allergy                 Recommendations:   Tagged RBC scan was ordered by GI, receiving blood transfusion  D/c Diflucan   Follow CBC , renal function closely . Subjective:       History of Present Illness  Patient is a 77 y.o.  female admitted with Sepsis due to urinary tract infection   who is seen in consult for the same . She presented w dysuria and lower abd pain for around a week associated with vomiting ,was found hypotensive with leukocytosis . CT suggested emphysematous cystitis,possible gastric outlet obstruction. CXR showed a right lower infiltrate. High CA-19-9,CEA  Allergy to [facility] w SOB   Urine culture  grew ESCHERICHIA COLI resistant to Ampicillin ,sensitive to all other tested ABXS . Blood cultures negative . Mycoplasma IGM 0.97   YEAST MODERATE GROWTH on sputum culture   S/P EGD   with reported esophagitis. GB US Findings suggesting acute cholecystitis. HIIDA showed absent gallbladder filling. She was extubated on   Status post cholecystectomy tube  by interventional radiology,  cultures on  grew Candida non-albicans and one colony of ESBL Klebsiella.    PICC line was placed     Interval history :  Right blood per rectum, hemoglobin 6.4  She is complaining of abdominal pain,loose stool , no fever  She is  still on TPN  No fever    WBC normal.    Past tablet Take 1 tablet by mouth daily.  omeprazole (PRILOSEC) 20 MG capsule Take 20 mg by mouth daily.  venlafaxine (EFFEXOR) 37.5 MG tablet Take 37.5 mg by mouth 3 times daily.  Misc. Devices [facility] [address] [place] wheelchair with left fupper extremity support  Dx: stroke with left hemiparesis. 1 each 0           Allergies  Allergies   Allergen Reactions    Penicillins Shortness Of Breath and Rash    Amoxicillin         Social   Social History     Tobacco Use    Smoking status: Current Some Day Smoker     Packs/day: 1.00     Years: 30.00     Pack years: 30.00    Smokeless tobacco: Never Used   Substance Use Topics    Alcohol use: Not Currently     Alcohol/week: 16.8 oz     Types: 28 Glasses of wine per week                History reviewed. No pertinent family history. Review of Systems  Other than above 12 systems reviewed negative . Tolerating antibiotics. Physical Exam  BP (!) 111/48   Pulse 108   Temp 97.9 °F (36.6 °C) (Oral)   Resp 19   Ht 5' (1.524 m)   Wt 108 lb (49 kg)   SpO2 95%   BMI 21.09 kg/m²           General Appearance: alert ,NAD . Skin: warm and dry, no rash or erythema  Head: normocephalic and atraumatic  Eyes: pupils equal, round  Neck: neck supple and non tender   Pulmonary/Chest: coarse to auscultation bilaterally- no wheezes, rales or rhonchi  Cardiovascular: normal rate, regular rhythm, normal S1 and S2, no murmurs.   Abdomen: soft,mild upper abdomen tenderness  -distended,  no masses or organomegaly, gallbladder drain with  bile drainage   Extremities: no cyanosis, clubbing   Edema   PICC line in place       Data Review:    Recent Labs     05/01/19 0458 05/02/19  0506 05/03/19  0425   WBC 8.4 7.2 6.7   HGB 8.2* 7.2* 6.4*   HCT 24.0* 22.0* 19.5*   MCV 87.8 89.5 87.3   PLT 92* 111* 139*     Recent Labs     05/01/19 0458 05/02/19  0506 05/03/19  0425   *  131* 134* 135   K 4.1  4.1 4.2 3.4*   CL 99  99 100 99   CO2 24  25 26 28   PHOS 3.2 --  3.3   BUN 18  18 18 20   CREATININE <0.40*  <0.40* <0.40* <0.40*     Recent Labs     05/01/19 0458 05/02/19  0506 05/03/19  0425   AST 17 20 16   ALT 13 13 13   BILITOT 0.32 0.34 0.34   ALKPHOS 97 97 103     No results for input(s): LIPASE, AMYLASE in the last 72 hours. Recent Labs     05/01/19 0458 05/02/19  0506 05/03/19  0425   PROTIME 15.5* 15.9* 15.3*   INR 1.2 1.3 1.2       Imaging Studies:                           All appropriate imaging studies and reports reviewed: Yes       Ct Abdomen Pelvis Wo Contrast Additional Contrast? Oral    Result Date: 4/14/2019  EXAMINATION: CT OF THE ABDOMEN AND PELVIS WITHOUT CONTRAST 4/14/2019 7:34 pm TECHNIQUE: CT of the abdomen and pelvis was performed without the administration of intravenous contrast. Multiplanar reformatted images are provided for review. Dose modulation, iterative reconstruction, and/or weight based adjustment of the mA/kV was utilized to reduce the radiation dose to as low as reasonably achievable. COMPARISON: 04/11/2019 HISTORY: ORDERING SYSTEM PROVIDED HISTORY: ABDOMINAL PAIN TECHNOLOGIST PROVIDED HISTORY: Water soluble contrast only please Ordering Physician Provided Reason for Exam: Abdominal pain - Vented patient. Contrast given via nurse through NG tube. Acuity: Unknown Type of Exam: Unknown Relevant Medical/Surgical History: Hx - Sepsis due to urinary tract infection. FINDINGS: Lower Chest: New moderate layering bilateral pleural effusions with bilateral lower lobe atelectasis. Organs: Limited evaluation due lack of intravenous contrast.  Cholelithiasis redemonstrated. No gallbladder wall thickening or biliary ductal dilatation. Scattered tiny hypodense lesions in the liver are too small to characterize but statistically represent benign cysts or hemangiomas and appear unchanged. The pancreas, spleen, adrenal glands, and kidneys are unremarkable. There is no hydronephrosis or urinary tract calculus. GI/Bowel: The stomach is distended. Enteric tube is in place. No contrast is seen distal to the pylorus and there is contrast reflux into the distal esophagus. There is no evidence of bowel obstruction. The appendix is not definitely visualized. No focal pericecal inflammatory changes are evident. Pelvis: The urinary bladder is decompressed by Tran catheter. No pelvic mass is seen. Peritoneum/Retroperitoneum: Small amount of free fluid in the pelvic cavity. No free air or focal fluid collection. No abnormal lymph node. Normal abdominal aortic caliber. Moderate calcific atherosclerosis. Bones/Soft Tissues: No acute osseous abnormality. Diffuse anasarca. Moderate degenerative changes in the lumbar spine. 1. Distended, contrast filled stomach with reflux of contrast into the esophagus. No enteric contrast is seen distal to the pylorus suggesting delayed gastric emptying in the setting of ileus. 2. New moderate layering bilateral pleural effusions with bilateral lower lobe atelectasis. 3. Small amount of nonspecific free fluid in the pelvic cavity. 4. Anasarca. 5. Cholelithiasis. Xr Chest (single View Frontal)    Result Date: 4/13/2019  EXAMINATION: SINGLE XRAY VIEW OF THE CHEST 4/13/2019 7:18 am COMPARISON: April 12, 2019 HISTORY: ORDERING SYSTEM PROVIDED HISTORY: dyspnea TECHNOLOGIST PROVIDED HISTORY: dyspnea Ordering Physician Provided Reason for Exam: dyspnea Acuity: Acute Type of Exam: Subsequent/Follow-up Additional signs and symptoms: dyspnea FINDINGS: A right IJ catheter is seen with its tip terminating at the superior cavoatrial junction. The left chest wall pacemaker and leads are stable. The cardiomediastinal silhouette is stable. There is interval increased opacity at the right lung base, may be related to atelectasis versus pneumonia. There is small atelectasis and mild pleural effusion at the left lung base. There is no pneumothorax. There is no acute osseous abnormality.      Interval increased opacity at the right The hip joint is maintained. The femoral head projects within the acetabulum. No focal soft tissue abnormality is seen. Left knee, two views: The knee is flexed. No acute osseous abnormality. No joint effusion. Joint spaces are not optimally profiled but no significant arthritic changes are apparent. Left tib-fib, three views: There is evidence of a remote healed distal tibia fracture. No acute fracture or dislocation is seen. No focal soft tissue abnormality. No acute osseous abnormality of the left femur. No acute osseous abnormality of the left knee. No acute osseous abnormality of the left tib-fib. Healed remote distal tibia fracture. Marked osteopenia, likely due to disuse. Xr Knee Left (3 Views)    Result Date: 3/30/2019  EXAMINATION: 3 XRAY VIEWS OF THE LEFT KNEE 3/30/2019 10:58 am COMPARISON: March 27, 2018 HISTORY: ORDERING SYSTEM PROVIDED HISTORY: F/u L knee pain after cast TECHNOLOGIST PROVIDED HISTORY: AP, oblique, lateral F/u L knee pain after cast FINDINGS: Marked osteopenia. Interval casting, which degrades fine osseous detail question cortical offset in the lateral femoral metaphysis. No malalignment identified. No significant joint effusion. Interval casting. Question nondisplaced fracture in the lateral femoral metaphysis. Osteopenia. Xr Tibia Fibula Left (2 Views)    Result Date: 3/27/2019  EXAMINATION: 4 XRAY VIEWS OF THE LEFT FEMUR; 2 XRAY VIEWS OF THE LEFT KNEE; 3 XRAY VIEWS OF THE LEFT TIBIA AND FIBULA 3/27/2019 7:00 pm COMPARISON: Left hip 11/14/2015. HISTORY: ORDERING SYSTEM PROVIDED HISTORY: pain TECHNOLOGIST PROVIDED HISTORY: pain Ordering Physician Provided Reason for Exam: pt twisted wrong, lt knee pain, unable to straighten knee. Acuity: Acute Type of Exam: Initial FINDINGS: Left femur, four views: The bones are diffusely osteopenic. No acute fracture deformity. The hip joint is maintained. The femoral head projects within the acetabulum.   No focal soft tissue abnormality is seen. Left knee, two views: The knee is flexed. No acute osseous abnormality. No joint effusion. Joint spaces are not optimally profiled but no significant arthritic changes are apparent. Left tib-fib, three views: There is evidence of a remote healed distal tibia fracture. No acute fracture or dislocation is seen. No focal soft tissue abnormality. No acute osseous abnormality of the left femur. No acute osseous abnormality of the left knee. No acute osseous abnormality of the left tib-fib. Healed remote distal tibia fracture. Marked osteopenia, likely due to disuse. Xr Abdomen (kub) (single Ap View)    Result Date: 4/14/2019  EXAMINATION: SINGLE SUPINE XRAY VIEW(S) OF THE ABDOMEN 4/14/2019 7:39 am COMPARISON: CT abdomen and pelvis  film from 11 April 2019 HISTORY: [address] [facility] [address]: Abd Distention TECHNOLOGIST PROVIDED HISTORY: Abd Distention Ordering Physician Provided Reason for Exam: Abdominal distention. Pt was moving around in the bed. Best films at present time. Acuity: Acute Type of Exam: Initial Additional signs and symptoms: Abdominal distention. Pt was moving around in the bed. Best films at present time. FINDINGS: Portable view time stamped at 748 hours demonstrates an intestinal tube terminating in the midportion of a gaseous Spike dilated stomach. Densities are present over the stomach likely medication. Bipolar pacemaker is in situ with intact leads. Heart size is top-normal, stable. Gaseous distension of the stomach and loop of bowel in the upper mid abdomen is noted but there is gas and fecal material in the rectum. Gastric outlet obstruction or proximal small bowel partial obstruction is suspected. No free air is noted. Midline city is present over the pelvis likely a monitor or CT small bore catheter. Vascular calcification is present in the pelvis.      Persistent preferential gaseous distension of the stomach although there is some gas in the upper abdomen and gas and fecal material present in the rectosigmoid. Findings suggest gastric outlet obstruction. Ct Abdomen Pelvis W Iv Contrast    Result Date: 4/11/2019  EXAMINATION: CT OF THE ABDOMEN AND PELVIS WITH CONTRAST 4/11/2019 5:14 pm TECHNIQUE: CT of the abdomen and pelvis was performed with the administration of intravenous contrast. Multiplanar reformatted images are provided for review. Dose modulation, iterative reconstruction, and/or weight based adjustment of the mA/kV was utilized to reduce the radiation dose to as low as reasonably achievable. COMPARISON: None. HISTORY: ORDERING SYSTEM PROVIDED HISTORY: Abdominal pain TECHNOLOGIST PROVIDED HISTORY: IV Only Contrast Ordering Physician Provided Reason for Exam: patient c/o abd pain for an hour FINDINGS: Lower Chest: Trace pleural fluid bilaterally is noted. Indwelling cardiac pacemaker is present. Heart size is normal.  Coronary artery calcifications are evident. The esophagus is significantly dilated and fluid-filled. No paraesophageal adenopathy is evident. Organs: The spleen, pancreas, and adrenals are unremarkable. The liver contains hypodensities, likely cysts. The gallbladder is distended and contains a solitary gallstone. The kidneys excrete contrast bilaterally. Extrarenal collecting systems are noted. The ureters are mildly dilated down to the urinary bladder without evidence of intraluminal or ureteral obstructing calculi. GI/Bowel: Marked distention of the stomach is noted; this continues to the pylorus with appearance suggesting partial gastric outlet obstruction. Fluid is present in some small bowel loops and colon distal to the stomach. No small bowel obstruction. Pelvis: Marked distention of the urinary bladder is noted. 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 Distended ureters may be related to reflux or infection.   No free pelvic fluid, pelvic or inguinal 4/14/2019  EXAMINATION: SINGLE XRAY VIEW OF THE CHEST 4/14/2019 7:57 am COMPARISON: Portable chest 04/13/2019. HISTORY: ORDERING SYSTEM PROVIDED HISTORY: Intubation TECHNOLOGIST PROVIDED HISTORY: Intubation Ordering Physician Provided Reason for Exam: intubation Acuity: Acute Type of Exam: Initial Additional signs and symptoms: intubation FINDINGS: Endotracheal tube terminates over the midthoracic trachea. Dual-chamber pacemaker leads appear unchanged in position. Right IJ approach central venous catheter unchanged in position. Heart size not substantially changed. Perihilar and basilar opacities further increased. Left pleural effusion increased in size. Findings may reflect pulmonary edema, progressed from yesterday's exam.  Left pleural effusion increased in size. Xr Chest Portable    Result Date: 4/12/2019  EXAMINATION: SINGLE XRAY VIEW OF THE CHEST 4/12/2019 5:26 am COMPARISON: November 14, 2015. HISTORY: ORDERING SYSTEM PROVIDED HISTORY: line placement TECHNOLOGIST PROVIDED HISTORY: line placement Ordering Physician Provided Reason for Exam: New right side line placement. Acuity: Acute Type of Exam: Initial Additional signs and symptoms: New right side line placement. FINDINGS: Stable left pectoral trans venous cardiac pacer device. New right IJ central venous catheter with tip near the superior atrial caval junction. Normal lung volume. No new consolidation. Curvilinear radiopacity projecting over the right upper lobe likely represents artifact from a skin fold. No pleural effusion or pneumothorax. Stable cardiomediastinal silhouette and great vessels with redemonstration of atherosclerotic thoracic aorta. New right IJ central venous catheter with tip near the superior atrial caval junction. No pneumothorax. No new consolidation. Thank you for allowing me to participate in the care of your patient. Please feel free to contact me with any questions or concerns.

## 2019-05-03 NOTE — PROGRESS NOTES
Nutrition Assessment (Parenteral Nutrition)    Type and Reason for Visit: Reassess    Nutrition Recommendations: Continue diet as ordered, follow for plan from GI. Following changes made in additives: Na acetate- 70 to 30 mEq, NaCl- 160 to 200 mEq, KCl- 10 to 35 mEq, add MVI & trace elements. Nutrition Assessment: Pt unchanged from a nutritional standpoint as TPN and lipids are her main source of nutrition, oral intake remains very poor. Pt now with complaints of abdominal pain and was positive for occult blood, GI following. Question if J-tube placement wound be a future consideration since issues with gastroparesis. Follow for plan from GI. Malnutrition Assessment:  · Malnutrition Status: At risk for malnutrition  · Context: Acute illness or injury  · Findings of the 6 clinical characteristics of malnutrition (Minimum of 2 out of 6 clinical characteristics is required to make the diagnosis of moderate or severe Protein Calorie Malnutrition based on AND/ASPEN Guidelines):  1. Energy Intake-Less than or equal to 75% of estimated energy requirement(Previously; improving with parenteral nutrition), Greater than or equal to 5 days    2. Weight Loss-Unable to assess(edema present),    3. Fat Loss-Unable to assess,    4. Muscle Loss-Unable to assess,    5. Fluid Accumulation-Mild fluid accumulation, Extremities  6.  Strength-Not measured    Nutrition Risk Level: High    Nutrient Needs:  · Estimated Daily Total Kcal: 4934-1609 based on wt of 25-27 per kg using wt of 57.2 kg  · Estimated Daily Protein (g): 63-68 based on 1.4-1.5 gm/kg IBW(revised)    Nutrition Diagnosis:   · Problem: Inadequate oral intake  · Etiology: related to Alteration in GI function, Insufficient energy/nutrient consumption     Signs and symptoms:  as evidenced by Nutrition support - PN, Intake 0-25%, GI abnormality    Objective Information:  · Nutrition-Focused Physical Findings: No appetite, abdominal pain, + occult blood.  Edema: +1 RUE, RLE, +2 LUE. · Wound Type: Multiple, Pressure Ulcer, Deep Tissue Injury(Wound under cast)  · Current Nutrition Therapies:  · Oral Diet Orders: Low Fat   · Oral Diet intake: 0%, 1-25%  · Oral Nutrition Supplement (ONS) Orders: None  · ONS intake: 0%  · Parenteral Nutrition Orders:  · Type and Formula: Premix Central, 2-in-1 Custom   · Lipids: 100ml, Daily  · Rate/Volume: 65 ml/ 1560 ml daily  · Duration: Continuous  · Current PN Order Provides: 1573 kcals, 78 gm of protein (lipids included)  · Goal PN Orders Provides: 0990-2367 kcal; 63-68 gm pro  · Anthropometric Measures:  · Ht: 5' (152.4 cm)   · Current Body Wt: 108 lb (49 kg)(5-1-19)  · Admission Body Wt: 102 lb (46.3 kg)  · Ideal Body Wt: 100 lb (45.4 kg), % Ideal Body 108%  · BMI Classification: BMI 18.5 - 24.9 Normal Weight(Based on admission wt)    Nutrition Interventions:   Continue current diet, Modify current Parenteral Nutrition  Continued Inpatient Monitoring, Education Not Indicated    Nutrition Evaluation:   · Evaluation: Progressing toward goals   · Goals: PN to meet greater 90% of estimated nutrition needs   · Monitoring: Meal Intake, PN Intake, Diet Tolerance, Nausea or Vomiting, Weight, Pertinent Labs, Monitor Bowel Function, I&O, Skin Integrity      Sheppard Dance R.D., L.D.   Clinical Dietitian  Office: [phone]

## 2019-05-03 NOTE — CONSULTS
.. PALLIATIVE CARE NURSING ASSESSMENT    Patient: Sue Kehr  Room: 2123/2123-01    Reason For Consult   Goals of care evaluation  Distress management  Guidance and support  Facilitate communications  Assistance in coordinating care      Impression: Sue Kehr is a 77y.o. year old female  has a past medical history of Abnormal computed tomography of cervical spine, CVA (cerebral vascular accident) ([address] GERD (gastroesophageal reflux disease), Hypertension, Paraproteinemia, and Weight loss. .  Currently hospitalized for the management of Sepsis due to UTI. The Palliative Care Team is following to assist with goals of care and support. Vital Signs  Blood pressure (!) 110/50, pulse 105, temperature 97.7 °F (36.5 °C), temperature source Axillary, resp. rate 18, height 5' (1.524 m), weight 108 lb (49 kg), SpO2 95 %. Patient Active Problem List   Diagnosis    Paraproteinemia    CVA (cerebral vascular accident) ([address]    Abnormal computed tomography of cervical spine    Weight loss    Functional gait abnormality    Left knee pain    Knee pain, left    Acute pain of left knee    Sepsis due to urinary tract infection ([address]    Cystitis    Emphysematous cystitis    Septic shock ([address]    Leukemoid reaction    Aspiration pneumonia of right lower lobe due to vomit ([address]    MRSA carrier    Urinary retention    Abdominal distention    Elevated CEA    Elevated CA 19-9 level    Cholecystitis    CRP elevated    Elevated procalcitonin    Bandemia    Centrilobular emphysema (HCC)    Rectal bleeding    GI bleed       Palliative Interaction:The patient is in bed and is alert and oriented. The GI physician is rounding, and he was explaining his plan of care. She is actively bleeding and she is going for some tests. I did ask her , if she had anyone who would be her voice if she could not at any time. She said \" no but I should . \"   I asked \" who would it be ?\" She said I would like my aid to be.\" I told her about the [place] law, and that if she did not appoint someone, it would be her son by law. \" She was then having stomach pain, and I told her that we would discuss it some more, and that I would come back. I updated staff. I called the  to check if later today she may want to get the medlcal DPOA filled out . Will follow for goals of care and support. Goals/Plan of care  Education/support to staff  Education/support to patient  Discharge planning/helping to coordinate care  Continue with current plan of care  Code status clarified: Full Code  Palliative care orders introduced  Validating patient/family distress  We discussed a medical DPOA and the importance . She stated that she would appoint her Aid. I will follow for goals of care and support. I called the , and they stated they would check later to see if she would like to complete the paperwork for a medical DPOA. Marly Ramires .[address] [place] [address]  Kelli Monday, VICKY  [facility]: [phone]  [address]: [phone]  Work Cell: [phone]

## 2019-05-03 NOTE — PROGRESS NOTES
member of club or organization: Not on file     Attends meetings of clubs or organizations: Not on file     Relationship status: Not on file    Intimate partner violence:     Fear of current or ex partner: Not on file     Emotionally abused: Not on file     Physically abused: Not on file     Forced sexual activity: Not on file   Other Topics Concern    Not on file   Social History Narrative    Not on file       Vitals:  BP (!) 114/59   Pulse 105   Temp 97.9 °F (36.6 °C) (Axillary)   Resp 19   Ht 5' (1.524 m)   Wt 108 lb (49 kg)   SpO2 95%   BMI 21.09 kg/m²   Temp (24hrs), Av.9 °F (36.6 °C), Min:97.3 °F (36.3 °C), Max:98.4 °F (36.9 °C)    Recent Labs     19  2311 19  0527 19  0721 19  1126   POCGLU 114* 108* 119* 140*       I/O (24Hr):     Intake/Output Summary (Last 24 hours) at 5/3/2019 1412  Last data filed at 5/3/2019 1117  Gross per 24 hour   Intake 2060 ml   Output 1225 ml   Net 835 ml       Labs:      CBC: Lab Results   Component Value Date    WBC 6.7 2019    RBC 2.24 2019    RBC 3.66 2012    HGB 9.3 2019    HCT 27.8 2019    MCV 87.3 2019    MCH 28.7 2019    MCHC 32.9 2019    RDW 15.6 2019     2019     2012    MPV 9.7 2019     CBC with Differential:  Lab Results   Component Value Date    WBC 6.7 2019    RBC 2.24 2019    RBC 3.66 2012    HGB 9.3 2019    HCT 27.8 2019     2019     2012    MCV 87.3 2019    MCH 28.7 2019    MCHC 32.9 2019    RDW 15.6 2019    METASPCT 3 2019    LYMPHOPCT 10 2019    MONOPCT 4 2019    BASOPCT 0 2019    MONOSABS 0.27 2019    LYMPHSABS 0.67 2019    EOSABS 0.07 2019    BASOSABS 0.00 2019    DIFFTYPE NOT REPORTED 2019     Hemoglobin/Hematocrit:  Lab Results   Component Value Date    HGB 9.3 2019    HCT 27.8 2019     CMP: Lab Results   Component Value Date     05/03/2019    K 3.4 05/03/2019    CL 99 05/03/2019    CO2 28 05/03/2019    BUN 20 05/03/2019    CREATININE <0.40 05/03/2019    GFRAA CANNOT BE CALCULATED 05/03/2019    LABGLOM CANNOT BE CALCULATED 05/03/2019    GLUCOSE 115 05/03/2019    GLUCOSE 87 05/21/2012    PROT 4.3 05/03/2019    LABALBU 1.5 05/03/2019    LABALBU 4.6 05/17/2012    CALCIUM 7.2 05/03/2019    BILITOT 0.34 05/03/2019    ALKPHOS 103 05/03/2019    AST 16 05/03/2019    ALT 13 05/03/2019     BMP:  Lab Results   Component Value Date     05/03/2019    K 3.4 05/03/2019    CL 99 05/03/2019    CO2 28 05/03/2019    BUN 20 05/03/2019    LABALBU 1.5 05/03/2019    LABALBU 4.6 05/17/2012    CREATININE <0.40 05/03/2019    CALCIUM 7.2 05/03/2019    GFRAA CANNOT BE CALCULATED 05/03/2019    LABGLOM CANNOT BE CALCULATED 05/03/2019    GLUCOSE 115 05/03/2019    GLUCOSE 87 05/21/2012     PT/INR:    Lab Results   Component Value Date    PROTIME 15.3 05/03/2019    PROTIME 10.9 03/28/2012    INR 1.2 05/03/2019     PTT:    Lab Results   Component Value Date    APTT 27.7 03/28/2012   [APTT}    Physical Examination:        General appearance: alert, lethargic cooperative and no distress  Mental Status: oriented to person, place and time and anxious affect    Abdomen: soft, mild, nondistended, bowel sounds present     Assessment:        Primary Problem  Sepsis due to urinary tract infection [facility])     Active Hospital Problems    Diagnosis Date Noted    GI bleed [K92.2] 05/03/2019    Rectal bleeding [K62.5]     Centrilobular emphysema ([address] [J43.2] 04/30/2019    CRP elevated [R79.82]     Elevated procalcitonin [R79.89]     Bandemia [D72.825]     Cholecystitis [K81.9]     Abdominal distention [R14.0]     Elevated CEA [R97.0]     Elevated CA 19-9 level [R97.8]     Urinary retention [R33.9]     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]     Septic shock (HCC) [A41.9, R65.21]     Leukemoid reaction [D72.823]     Aspiration pneumonia of right lower lobe due to vomit ([facility] [address] [J69.0]     MRSA carrier [Z22.322]     Sepsis due to urinary tract infection ([facility] [address] [A41.9, N39.0] 04/11/2019    Cystitis [N30.90] 04/11/2019     Past Medical History:   Diagnosis Date    Abnormal computed tomography of cervical spine     sclerotic bone appearance     CVA (cerebral vascular accident) ([facility] [address]     left  side weakness    GERD (gastroesophageal reflux disease)     Hypertension     Paraproteinemia     Weight loss         Plan:        1. Will order RBC tagged bleeding scan  2. Follow-up hemoglobin  3. May need colonoscopy  4. May need more blood transfusions  5. Continue to hold blood thinners  6.  Her questions were answered    Explained to the patient and d/W Nursing Staff  Will F/U with you  Please call or Page for any issues or change in status  Thanks    Electronically signed by Dontrell Aguilera MD on 5/3/2019 at 2:12 PM

## 2019-05-03 NOTE — PROGRESS NOTES
Patient with moderate amount of dark red bloody stool in depends. Hg 6.4. Dr. Micky Stevenson called to notify.

## 2019-05-03 NOTE — PLAN OF CARE
Problem: Risk for Impaired Skin Integrity  Goal: Tissue integrity - skin and mucous membranes  Description  Structural intactness and normal physiological function of skin and  mucous membranes. 5/3/2019 0356 by Ozzy Borrego RN  Outcome: Ongoing  Note:   Patient on waffle. Turned frequently. Buttocks redened. Mepelix intact around left lower leg cast.  5/2/2019 1551 by Kofi Jarquin RN  Outcome: Ongoing     Problem: Falls - Risk of:  Goal: Will remain free from falls  Description  Will remain free from falls  5/3/2019 0356 by Ozzy Borrego RN  Outcome: Ongoing  Note:   Patient wek and making no attempt to get out of bed. Alert and oriented. 5/2/2019 1551 by Kofi Jarquin RN  Outcome: Met This Shift  Goal: Absence of physical injury  Description  Absence of physical injury  5/3/2019 0356 by Ozzy Borrego RN  Outcome: Ongoing  5/2/2019 1551 by Kofi Jarquin RN  Outcome: Met This Shift     Problem: Infection, Septic Shock:  Goal: Will show no infection signs and symptoms  Description  Will show no infection signs and symptoms  5/3/2019 0356 by Ozzy Borrego RN  Outcome: Ongoing  Note:   VS stable. Telemetry NSR to ST. Fbrile. 5/2/2019 1551 by Kofi Jarquin RN  Outcome: Met This Shift     Problem: Tissue Perfusion, Altered:  Goal: Circulatory function within specified parameters  Description  Circulatory function within specified parameters  5/3/2019 0356 by Ozzy Borrego RN  Outcome: Ongoing  5/2/2019 1551 by Kofi Jarquin RN  Outcome: Met This Shift     Problem: Pain:  Goal: Pain level will decrease  Description  Pain level will decrease  5/3/2019 0356 by Ozzy Borrego RN  Outcome: Ongoing  Note:   Patient medicated with percocet for complaints of generalized pain.   Rested quietly after percocet given,  5/2/2019 1551 by Kofi Jarquin RN  Outcome: Ongoing  Goal: Control of acute pain  Description  Control of acute pain  5/3/2019

## 2019-05-03 NOTE — PLAN OF CARE
Problem: Risk for Impaired Skin Integrity  Goal: Tissue integrity - skin and mucous membranes  Description  Structural intactness and normal physiological function of skin and  mucous membranes. 5/3/2019 1707 by Wellington Hunt RN  Outcome: Ongoing  5/3/2019 0356 by Joana Perea RN  Outcome: Ongoing  Note:   Patient on waffle. Turned frequently. Buttocks redened. Mepelix intact around left lower leg cast.     Problem: Falls - Risk of:  Goal: Will remain free from falls  Description  Will remain free from falls  5/3/2019 1707 by Wellington Hunt RN  Outcome: Ongoing  5/3/2019 0356 by Joana Perea RN  Outcome: Ongoing  Note:   Patient wek and making no attempt to get out of bed. Alert and oriented. Goal: Absence of physical injury  Description  Absence of physical injury  5/3/2019 1707 by Wellington Hunt RN  Outcome: Ongoing  5/3/2019 0356 by Joana Perea RN  Outcome: Ongoing     Problem: Infection, Septic Shock:  Goal: Will show no infection signs and symptoms  Description  Will show no infection signs and symptoms  5/3/2019 1707 by Wellington Hunt RN  Outcome: Ongoing  5/3/2019 0356 by Joana Perea RN  Outcome: Ongoing  Note:   VS stable. [facility]. Problem: Tissue Perfusion, Altered:  Goal: Circulatory function within specified parameters  Description  Circulatory function within specified parameters  5/3/2019 1707 by Wellington Hunt RN  Outcome: Ongoing  5/3/2019 0356 by Joana Perea RN  Outcome: Ongoing     Problem: Pain:  Goal: Pain level will decrease  Description  Pain level will decrease  5/3/2019 1707 by Wellington Hunt RN  Outcome: Ongoing  5/3/2019 0356 by Joana Perea RN  Outcome: Ongoing  Note:   Patient medicated with percocet for complaints of generalized pain.   Rested quietly after percocet given,  Goal: Control of acute pain  Description  Control of acute pain  5/3/2019 1707 by Wellington Hunt RN  Outcome: Ongoing  5/3/2019 0356 by Princess Cruz RN  Outcome: Ongoing  Goal: Control of chronic pain  Description  Control of chronic pain  5/3/2019 1707 by Roxana Melissa RN  Outcome: Ongoing  5/3/2019 0356 by Princess Cruz RN  Outcome: Ongoing     Problem: Nutrition  Goal: Optimal nutrition therapy  Description       5/3/2019 1707 by Roxana Melissa RN  Outcome: Ongoing  5/3/2019 1454 by Miriam Christensen RD, LD  Outcome: Ongoing  5/3/2019 0356 by Princess Cruz RN  Outcome: Ongoing  Note:   TPN and lipids given s ordered. Taking few sips of water. Patient continues parenteral nutrition. Patient VSS. No injury this shift. Bed alarm on. Patient turned every 2 hour.

## 2019-05-03 NOTE — PROGRESS NOTES
Physical Therapy  DATE: 5/3/2019    NAME: Sukhjinder Reilly  MRN: [id number]   : 1950    Patient not seen this date for Physical Therapy due to:  [] Blood transfusion in progress  [] Hemodialysis  []  Patient Declined  [] Spine Precautions   [] Strict Bedrest  [] Surgery/ Procedure  [] Testing      [x] Other: Active bleeding, per VICKY Palencia, with transfusion pending; hgb 6.4. [] PT being discontinued at this time. Patient independent. No further needs. [] PT being discontinued at this time as the patient has been transferred to palliative care. No further needs.     Riley Chamberlain, PTA

## 2019-05-03 NOTE — PROGRESS NOTES
Jamal [facility] notified of hg 6.4 and bright red stool. Orders received.

## 2019-05-03 NOTE — PROGRESS NOTES
Dr. Drake Guajardo returned page. Updated MD on patient's active bleeding and current RBC transfusion. MD states to order tagged RBC scan.

## 2019-05-03 NOTE — PROGRESS NOTES
[facility]    PROGRESS NOTE             5/3/2019    5:14 PM    Name:   Keshia Joseph  MRN:     [id number]     Acct:      [de-identified]   Room:   [address] [place] [address]  IP Day:  25  Admit Date:  4/11/2019  2:48 PM    PCP:  Malinda Baca DO  Code Status:  Full Code    Subjective:     C/C:   Chief Complaint   Patient presents with    Abdominal Pain     Interval History Status: worsened. Patient seen bedside on progressive. VSS. Tachycardic in low 100's. Patient complains of weakness. Pt continues to have gross bright red blood in stools. Hb dropped from 7.2 to 6.7. Will transfuse pRBC. GI consulted - appreciate recs. Brief History:     The patient is a 68 y.o.  presents sepsis secondary to acute cystitis. Review of Systems:     Review of Systems   Constitutional: Positive for fatigue. Negative for chills and fever. Respiratory: Negative for shortness of breath. Cardiovascular: Negative for chest pain, palpitations and leg swelling. Gastrointestinal: Positive for diarrhea. Negative for abdominal pain, constipation, nausea and vomiting. Genitourinary: Negative for dysuria. Musculoskeletal: Negative for myalgias. Neurological: Negative for weakness, light-headedness and headaches. Medications: Allergies:     Allergies   Allergen Reactions    Penicillins Shortness Of Breath and Rash    Amoxicillin        Current Meds:   Scheduled Meds:    sodium chloride  250 mL Intravenous Once    sodium chloride  250 mL Intravenous Once    predniSONE  10 mg Oral Daily    mometasone-formoterol  2 puff Inhalation BID    pantoprazole  40 mg Oral QAM AC    metoprolol tartrate  12.5 mg Oral BID    fluconazole  200 mg Oral Daily    fat emulsion  100 mL Intravenous Daily    furosemide  40 mg Intravenous Daily    magnesium sulfate  1 g Intravenous Once    ipratropium  0.5 mg Nebulization Q4H    insulin lispro 0-12 Units Subcutaneous Q6H    levalbuterol  1.25 mg Nebulization Q4H    venlafaxine  37.5 mg Oral TID    aspirin  81 mg Oral Daily    sodium chloride flush  10 mL Intravenous 2 times per day     Continuous Infusions:    PN-Adult 2-in-1 Central Line (Standard)      PN-Adult 2-in-1 [facility] (Standard) 65 mL/hr at 19 1712    sodium chloride 10 mL/hr at 19 0029    dextrose       PRN Meds: sodium chloride flush, hydrOXYzine, metoprolol, sodium chloride nebulizer, fentanNYL, oxyCODONE-acetaminophen, magnesium sulfate, sodium phosphate IVPB **OR** sodium phosphate IVPB, potassium chloride **OR** potassium alternative oral replacement **OR** potassium chloride, glucose, dextrose, glucagon (rDNA), dextrose, milk and molasses, sodium chloride flush, acetaminophen    Data:     Past Medical History:   has a past medical history of Abnormal computed tomography of cervical spine, CVA (cerebral vascular accident) ([address] GERD (gastroesophageal reflux disease), Hypertension, Paraproteinemia, and Weight loss. Social History:   reports that she has been smoking. She has a 30.00 pack-year smoking history. She has never used smokeless tobacco. She reports that she drank about 16.8 oz of alcohol per week. She reports that she does not use drugs. Family History: History reviewed. No pertinent family history. Vitals:  BP (!) 114/59   Pulse 105   Temp 97.9 °F (36.6 °C) (Axillary)   Resp 19   Ht 5' (1.524 m)   Wt 108 lb (49 kg)   SpO2 95%   BMI 21.09 kg/m²   Temp (24hrs), Av.9 °F (36.6 °C), Min:97.3 °F (36.3 °C), Max:98.4 °F (36.9 °C)    Recent Labs     19  0527 19  0721 19  1126 19  1605   POCGLU 108* 119* 140* 101       I/O(24Hr):     Intake/Output Summary (Last 24 hours) at 5/3/2019 1714  Last data filed at 5/3/2019 1117  Gross per 24 hour   Intake 2060 ml   Output 1225 ml   Net 835 ml       Labs:    Lab Results   Component Value Date    WBC 6.7 2019    HGB 9.3 (L) 05/03/2019    HCT 27.8 (L) 05/03/2019    MCV 87.3 05/03/2019     (L) 05/03/2019     Lab Results   Component Value Date     05/03/2019    K 3.4 (L) 05/03/2019    CL 99 05/03/2019    CO2 28 05/03/2019    BUN 20 05/03/2019    CREATININE <0.40 (L) 05/03/2019    GLUCOSE 115 (H) 05/03/2019    CALCIUM 7.2 (L) 05/03/2019    PROT 4.3 (L) 05/03/2019    LABALBU 1.5 (L) 05/03/2019    BILITOT 0.34 05/03/2019    ALKPHOS 103 05/03/2019    AST 16 05/03/2019    ALT 13 05/03/2019    LABGLOM CANNOT BE CALCULATED 05/03/2019    GFRAA CANNOT BE CALCULATED 05/03/2019    GLOB NOT REPORTED 04/18/2019           Lab Results   Component Value Date/Time    SPECIAL NOT REPORTED 04/23/2019 10:10 PM     Lab Results   Component Value Date/Time    CULTURE (A) 04/22/2019 04:59 PM     YEAST, NOT CANDIDA ALBICANS OR CANDIDA DUBLINIENSIS SCANT GROWTH    CULTURE (A) 04/22/2019 04:59 PM     KLEBSIELLA PNEUMONIAE ONE COLONY FORMING UNIT THIS ORGANISM IS AN EXTENDED-SPECTRUM BETA-LACTAMASE  AND RESISTANCE TO THERAPY WITH PENICILLINS, CEPHALOSPORINS AND AZTREONAM IS EXPECTED. THESE ORGANISMS GENERALLY REMAIN SUSCEPTIBLE TO CARBAPENEMS. CONSIDER ID CONSULTATION. CULTURE NO ANAEROBIC ORGANISMS ISOLATED AT 5 DAYS (A) 04/22/2019 04:59 PM         Radiology:    Ct Abdomen Pelvis Wo Contrast Additional Contrast? Oral    Result Date: 4/14/2019  EXAMINATION: CT OF THE ABDOMEN AND PELVIS WITHOUT CONTRAST 4/14/2019 7:34 pm TECHNIQUE: CT of the abdomen and pelvis was performed without the administration of intravenous contrast. Multiplanar reformatted images are provided for review. Dose modulation, iterative reconstruction, and/or weight based adjustment of the mA/kV was utilized to reduce the radiation dose to as low as reasonably achievable.  COMPARISON: 04/11/2019 HISTORY: ORDERING SYSTEM PROVIDED HISTORY: ABDOMINAL PAIN TECHNOLOGIST PROVIDED HISTORY: Water soluble contrast only please Ordering Physician Provided Reason for Exam: Abdominal pain - Vented patient. Contrast given via nurse through NG tube. Acuity: Unknown Type of Exam: Unknown Relevant Medical/Surgical History: Hx - Sepsis due to urinary tract infection. FINDINGS: Lower Chest: New moderate layering bilateral pleural effusions with bilateral lower lobe atelectasis. Organs: Limited evaluation due lack of intravenous contrast.  Cholelithiasis redemonstrated. No gallbladder wall thickening or biliary ductal dilatation. Scattered tiny hypodense lesions in the liver are too small to characterize but statistically represent benign cysts or hemangiomas and appear unchanged. The pancreas, spleen, adrenal glands, and kidneys are unremarkable. There is no hydronephrosis or urinary tract calculus. GI/Bowel: The stomach is distended. Enteric tube is in place. No contrast is seen distal to the pylorus and there is contrast reflux into the distal esophagus. There is no evidence of bowel obstruction. The appendix is not definitely visualized. No focal pericecal inflammatory changes are evident. Pelvis: The urinary bladder is decompressed by Tran catheter. No pelvic mass is seen. Peritoneum/Retroperitoneum: Small amount of free fluid in the pelvic cavity. No free air or focal fluid collection. No abnormal lymph node. Normal abdominal aortic caliber. Moderate calcific atherosclerosis. Bones/Soft Tissues: No acute osseous abnormality. Diffuse anasarca. Moderate degenerative changes in the lumbar spine. 1. Distended, contrast filled stomach with reflux of contrast into the esophagus. No enteric contrast is seen distal to the pylorus suggesting delayed gastric emptying in the setting of ileus. 2. New moderate layering bilateral pleural effusions with bilateral lower lobe atelectasis. 3. Small amount of nonspecific free fluid in the pelvic cavity. 4. Anasarca. 5. Cholelithiasis.      Xr Chest (single View Frontal)    Result Date: 5/2/2019  EXAMINATION: SINGLE XRAY VIEW OF THE CHEST 5/2/2019 6:34 am COMPARISON: 29 April 2019 HISTORY: ORDERING SYSTEM PROVIDED HISTORY: COPD TECHNOLOGIST PROVIDED HISTORY: COPD Ordering Physician Provided Reason for Exam: COPD Acuity: Acute Type of Exam: Initial FINDINGS: AP portable view of the chest time stamped at 630 hours demonstrates stable cardiac size. Overlying cardiac monitoring electrodes are present. Right-sided PICC line terminates in the right atrium. Bipolar pacemaker enters from the left with intact leads in appropriate positions. There is been no significant interval change in bilateral interstitial opacities, representing interval change since 2015. This may be related to worsening interstitial disease or superimposed venous congestion. Bilateral effusions are noted with bibasilar opacities, either atelectasis or edema. Continued bilateral effusions, bibasilar opacities and bilateral interstitial opacities in the upper lobes. Findings may be related to worsening interstitial disease and edema. Airspace disease is not excluded. Xr Chest (single View Frontal)    Result Date: 4/13/2019  EXAMINATION: SINGLE XRAY VIEW OF THE CHEST 4/13/2019 7:18 am COMPARISON: April 12, 2019 HISTORY: ORDERING SYSTEM PROVIDED HISTORY: dyspnea TECHNOLOGIST PROVIDED HISTORY: dyspnea Ordering Physician Provided Reason for Exam: dyspnea Acuity: Acute Type of Exam: Subsequent/Follow-up Additional signs and symptoms: dyspnea FINDINGS: A right IJ catheter is seen with its tip terminating at the superior cavoatrial junction. The left chest wall pacemaker and leads are stable. The cardiomediastinal silhouette is stable. There is interval increased opacity at the right lung base, may be related to atelectasis versus pneumonia. There is small atelectasis and mild pleural effusion at the left lung base. There is no pneumothorax. There is no acute osseous abnormality.      Interval increased opacity at the right lung base, may be related to atelectasis versus pneumonia. Small atelectasis and mild pleural effusion at the left lung, new since the prior study. Xr Chest Standard (2 Vw)    Result Date: 4/29/2019  EXAMINATION: TWO VIEWS OF THE CHEST 4/29/2019 8:04 pm COMPARISON: 04/27/2019 HISTORY: ORDERING SYSTEM PROVIDED HISTORY: Follow-up lung infiltrate TECHNOLOGIST PROVIDED HISTORY: Follow-up lung infiltrate Ordering Physician Provided Reason for Exam: Follow-up infiltrate. Pt unable to lean forward - unable to get proper sponge behind pt for lateral. Pt unable to raise left arm at all for lateral. Best possible lateral obtained. Acuity: Unknown Type of Exam: Unknown Additional signs and symptoms: Follow-up infiltrate. Pt unable to lean forward - unable to get proper sponge behind pt for lateral. Pt unable to raise left arm at all for lateral. Best possible lateral obtained. FINDINGS: Left chest cardiac pacemaker device is in place. Right IJ central venous catheter in place with distal tip at the cavoatrial junction. Heart size is within normal limits. No vascular congestion. There are small to moderate pleural effusions. There are interstitial opacities in the upper lungs, which could be related to scarring and/or developing infiltrate, slightly improved in appearance when compared to 04/27/2019. No evidence of pneumothorax. 1.  Small to moderate bilateral pleural effusions, better visualized on lateral view. 2.  Upper lobe interstitial opacities, slightly improved when compared to the previous study, possibly related to underlying fibrotic change or residual infiltrate.      Xr Abdomen (kub) (single Ap View)    Result Date: 4/19/2019  EXAMINATION: SINGLE SUPINE XRAY VIEW(S) OF THE ABDOMEN 4/19/2019 9:48 am COMPARISON: April 14, 2019 HISTORY: ORDERING SYSTEM PROVIDED HISTORY: pain TECHNOLOGIST PROVIDED HISTORY: pain Ordering Physician Provided Reason for Exam: Abdominal pain and distentsion Acuity: Acute Type of Exam: Initial Additional signs and symptoms: Abdominal pain and distentsion FINDINGS: Enteric tube with the tip within the stomach. Small left pleural effusion. Minimal right pleural effusion. Mildly dilated loops of bowel. Nonspecific bowel gas pattern with mildly dilated loops of bowel. Xr Abdomen (kub) (single Ap View)    Result Date: 4/14/2019  EXAMINATION: SINGLE SUPINE XRAY VIEW(S) OF THE ABDOMEN 4/14/2019 7:39 am COMPARISON: CT abdomen and pelvis  film from 11 April 2019 HISTORY: [address]: Abd Distention TECHNOLOGIST PROVIDED HISTORY: Abd Distention Ordering Physician Provided Reason for Exam: Abdominal distention. Pt was moving around in the bed. Best films at present time. Acuity: Acute Type of Exam: Initial Additional signs and symptoms: Abdominal distention. Pt was moving around in the bed. Best films at present time. FINDINGS: Portable view time stamped at 748 hours demonstrates an intestinal tube terminating in the midportion of a gaseous Spike dilated stomach. Densities are present over the stomach likely medication. Bipolar pacemaker is in situ with intact leads. Heart size is top-normal, stable. Gaseous distension of the stomach and loop of bowel in the upper mid abdomen is noted but there is gas and fecal material in the rectum. Gastric outlet obstruction or proximal small bowel partial obstruction is suspected. No free air is noted. Midline city is present over the pelvis likely a monitor or CT small bore catheter. Vascular calcification is present in the pelvis. Persistent preferential gaseous distension of the stomach although there is some gas in the upper abdomen and gas and fecal material present in the rectosigmoid. Findings suggest gastric outlet obstruction.      Ct Abdomen Pelvis W Iv Contrast    Result Date: 4/11/2019  EXAMINATION: CT OF THE ABDOMEN AND PELVIS WITH CONTRAST 4/11/2019 5:14 pm TECHNIQUE: CT of the abdomen and pelvis was performed with the administration of intravenous contrast. Multiplanar reformatted images are provided for review. Dose modulation, iterative reconstruction, and/or weight based adjustment of the mA/kV was utilized to reduce the radiation dose to as low as reasonably achievable. COMPARISON: None. HISTORY: ORDERING SYSTEM PROVIDED HISTORY: Abdominal pain TECHNOLOGIST PROVIDED HISTORY: IV Only Contrast Ordering Physician Provided Reason for Exam: patient c/o abd pain for an hour FINDINGS: Lower Chest: Trace pleural fluid bilaterally is noted. Indwelling cardiac pacemaker is present. Heart size is normal.  Coronary artery calcifications are evident. The esophagus is significantly dilated and fluid-filled. No paraesophageal adenopathy is evident. Organs: The spleen, pancreas, and adrenals are unremarkable. The liver contains hypodensities, likely cysts. The gallbladder is distended and contains a solitary gallstone. The kidneys excrete contrast bilaterally. Extrarenal collecting systems are noted. The ureters are mildly dilated down to the urinary bladder without evidence of intraluminal or ureteral obstructing calculi. GI/Bowel: Marked distention of the stomach is noted; this continues to the pylorus with appearance suggesting partial gastric outlet obstruction. Fluid is present in some small bowel loops and colon distal to the stomach. No small bowel obstruction. Pelvis: Marked distention of the urinary bladder is noted. 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 Distended ureters may be related to reflux or infection. No free pelvic fluid, pelvic or inguinal adenopathy is noted. Peritoneum/Retroperitoneum: No aortic aneurysm. Mild to moderate plaque is noted in the infrarenal abdominal aorta and both proximal iliac arteries. Shotty lymph nodes are present around the aorta in the upper abdomen. No mesenteric adenopathy is noted.  Bones/Soft Tissues: Degenerative changes are present in the hips and lower lumbar facets. Estimated biologic radiation dose for this procedure:258.77 mGy/cm2.     1. Dilatation of the thoracic esophagus filled with fluid. No obstructive process is noted. No adjacent enlarged lymph nodes. 2. Trace pleural fluid. 3. Marked distention of the stomach. This appears to extend to the pylorus. Partial gastric outlet obstruction is not excluded. 4. Bilateral dilated ureters without obstructing calculi. Urinary bladder is markedly distended with bladder wall air suggesting emphysematous cystitis. Dilatation of the ureters may be related to reflux or infection. 5. Atherosclerotic disease. 6. Other findings as above. Critical results were called by Dr. Chip Bansal MD to [address] on 4/11/2019 at 17:37. Ir Darío Kaur Device Plmt/replace/removal    Result Date: 4/30/2019  PROCEDURE: ULTRASOUND GUIDED VASCULAR ACCESS. FLUOROSCOPY GUIDED PICC PLACEMENT 4/30/2019. HISTORY: ORDERING SYSTEM PROVIDED HISTORY: TPN TECHNOLOGIST PROVIDED HISTORY: TPN Lumen?->Double Lumen SEDATION: None FLUOROSCOPY DOSE AND TYPE OR TIME AND EXPOSURES: 7 seconds; D  TECHNIQUE: This procedure was performed by Dr. Lynne Amezquita. Informed consent was obtained after a detailed explanation of the procedure including risks, benefits, and alternatives. Universal protocol was observed. The right arm was prepped and draped in sterile fashion using maximum sterile barrier technique. Local anesthesia was achieved with lidocaine. A micropuncture needle was used to access the right basilic vein using ultrasound guidance. An ultrasound image demonstrating patency of the vein with needle tip located within it. An image was obtained and stored in PACs. A 0.018 guidewire was used to place a peel-a-way sheath and a 5 [place] dual-lumen PICC was advanced with fluoroscopic guidance with the tip at the cavo-atrial junction. The catheter flushed easily and there was a good blood return. The catheter was secured to the skin.   The patient tolerated the procedure well and there were no immediate complications. FINDINGS: Fluoroscopic image demonstrates the tip of the catheter at the cavo-atrial junction. Successful ultrasound and fluoroscopy guided PICC placement     Us Gallbladder Ruq    Result Date: 4/19/2019  EXAMINATION: RIGHT UPPER QUADRANT ULTRASOUND 4/19/2019 10:48 am COMPARISON: CT abdomen and pelvis 4/14/2018 HISTORY: ORDERING SYSTEM PROVIDED HISTORY: ABDOMINAL PAIN FINDINGS: Pancreas is not well visualized. No liver lesion. Gallbladder wall thickening. Gallbladder sludge. Cholelithiasis. Pericholecystic fluid. Common bile duct measures at the upper limits of normal at 7 mm. Findings suggesting acute cholecystitis. Xr Chest Portable    Result Date: 4/27/2019  EXAMINATION: SINGLE XRAY VIEW OF THE CHEST 4/27/2019 8:47 am COMPARISON: 04/26/2019 HISTORY: ORDERING SYSTEM PROVIDED HISTORY: Assess for pulm edema vs PNA TECHNOLOGIST PROVIDED HISTORY: Assess for pulm edema vs PNA Ordering Physician Provided Reason for Exam: cough, congestion Acuity: Acute Type of Exam: Initial FINDINGS: Right IJ central venous catheter is in place tip in the SVC right atrial junction. Pacer wires are in place. The heart and mediastinal structures are stable. Pleural effusions are present with bibasilar atelectasis. Bilateral lung infiltrates are present in the upper lung fields. Persistent pleural effusions with bibasilar atelectasis and bilateral lung infiltrates with areas of consolidation developing in the upper lobes. Xr Chest Portable    Result Date: 4/26/2019  EXAMINATION: SINGLE XRAY VIEW OF THE CHEST 4/26/2019 6:51 am COMPARISON: April 24, 2019 HISTORY: ORDERING SYSTEM PROVIDED HISTORY: Pleural effusions TECHNOLOGIST PROVIDED HISTORY: Pleural effusions Ordering Physician Provided Reason for Exam: pleural effusion Acuity: Acute Type of Exam: Initial FINDINGS: Right IJ line in good position. Pacer device is stable.   Cardiac leads overlie the chest.  Stable cardiomediastinal contours with calcified aortic arch. Left effusion and associated airspace disease, slightly decreased. Chronic blunting of right costophrenic sulcus may represent scarring or chronic effusion. Increased reticular markings right upper chest and left upper chest possibly interstitial edema or interstitial pneumonia. No new airspace consolidation. Increasing reticular markings upper chest bilaterally right greater than left possibly due to edema or interstitial pneumonitis. Improving left effusion with associated retrocardiac airspace disease. Pleural-parenchymal scarring or effusion in the right lung base, stable. Xr Chest Portable    Result Date: 4/24/2019  EXAMINATION: SINGLE XRAY VIEW OF THE CHEST 4/24/2019 6:05 am COMPARISON: Chest radiograph dated 04/22/2019 HISTORY: ORDERING SYSTEM PROVIDED HISTORY: Acute respiratory failure, pleural effusion TECHNOLOGIST PROVIDED HISTORY: Acute respiratory failure, pleural effusion Ordering Physician Provided Reason for Exam: pleural effusion, respiratory failure. Acuity: Acute Type of Exam: Initial FINDINGS: Heart size is normal.  Dual-chamber pacemaker placed via left subclavian approach. Right internal jugular central line has tip in distal superior vena cava. Interval removal of nasogastric tube. No interval change of infiltrate and atelectasis at the left lung base. Stable mild infiltrate in the left upper lobe. Mild interval improvement of infiltrate at the right lung base. Stable small bilateral pleural effusions, left larger than right. Mild CHF, stable. 1.  No interval change of infiltrate and atelectasis at the left lung base. Stable mild infiltrate in the left upper lobe. 2.  Mild interval improvement of infiltrate at the right lung base. 3.  Stable small bilateral pleural effusions, left larger than right. 4.  Mild CHF, stable.      Xr Chest Portable    Result Date: 4/22/2019  EXAMINATION: SINGLE XRAY VIEW OF Exam: Vent Pt check ETT placement Acuity: Acute Type of Exam: Subsequent/Follow-up Additional signs and symptoms: Vent Pt check ETT placement FINDINGS: Tubes and lines are unchanged. Persistent bibasilar lung opacities and pleural effusions. Mild pulmonary edema. No significant interval change. Xr Chest Portable    Result Date: 4/18/2019  EXAMINATION: SINGLE XRAY VIEW OF THE CHEST 4/18/2019 6:21 am COMPARISON: April 17, 2019 HISTORY: ORDERING SYSTEM PROVIDED HISTORY: ETT placement TECHNOLOGIST PROVIDED HISTORY: ETT placement Ordering Physician Provided Reason for Exam: on vent Acuity: Acute Type of Exam: Initial FINDINGS: Right IJ line and NG tube are stable. Interval advancement of ET tube terminating 3.1 cm from ron. Implanted cardiac device is present. Left-sided effusion and associated airspace disease is stable. Hazy right basilar opacity possibly edema or atelectasis. No pneumothorax. Increased opacity left upper chest possibly focal area of atelectasis, new from prior. Advanced ETT from prior exam, now 3.1 cm from ron. Increased opacity left upper chest suspicious for atelectasis. Additional supporting devices are stable. Xr Chest Portable    Result Date: 4/17/2019  EXAMINATION: SINGLE XRAY VIEW OF THE CHEST 4/17/2019 7:15 am COMPARISON: 16 April 2019 HISTORY: ORDERING SYSTEM PROVIDED HISTORY: ETT placement TECHNOLOGIST PROVIDED HISTORY: ETT placement Ordering Physician Provided Reason for Exam: on vent Acuity: Acute Type of Exam: Initial FINDINGS: AP portable view of the chest time stamped at 613 hours demonstrates overlying cardiac monitoring electrodes. A right internal jugular catheter terminates in the superior vena cava. Endotracheal tube is somewhat high riding terminating 6.4 cm above the ron. Intestinal tube extends beyond the body of the stomach, tip not included on the exam.  Bipolar pacemaker enters from the left with intact leads in appropriate positions.  Heart size is normal.  Left pleural effusion is noted there is continued volume loss in the left hemithorax with stable mild vascular congestion, perihilar opacities, and faint opacity in the right mid and lower lung field. Underlying COPD is noted. Osseous structures are stable. There is little change from prior study. Findings of COPD, mild central vascular congestion, left effusion, and scattered opacities favoring interstitial edema with multifocal pneumonitis not excluded. Tubes and lines as above. However, endotracheal tube is high riding. The findings were sent to the Radiology Results [address] at 7:58 am on 4/17/2019to be communicated to a licensed caregiver. RECOMMENDATION: Suggest advancement of endotracheal tube. Xr Chest Portable    Result Date: 4/16/2019  EXAMINATION: SINGLE XRAY VIEW OF THE CHEST 4/16/2019 6:54 am COMPARISON: 04/15/2019, 610 hours HISTORY: ORDERING SYSTEM PROVIDED HISTORY: ETT placement TECHNOLOGIST PROVIDED HISTORY: ETT placement Ordering Physician Provided Reason for Exam: on vent Acuity: Acute Type of Exam: Initial 68-year-old female on ventilator; check endotracheal tube placement FINDINGS: Portable AP upright view of the chest. Endotracheal tube distal tip overlying the mid trachea approximately 4.1 cm above the level of the ron. Enteric tube traverses the GE junction with distal tip excluded from the field of view. Left subclavian approach cardiac pacemaker device distal lead tips relatively stable in position. Right internal jugular approach central venous catheter distal tip overlying the high right atrium, stable. Cardiac monitor leads overlie the chest. Atherosclerotic calcification of the thoracic aorta. Slight stable volume loss of the left hemithorax. No pneumothorax. No free air. Dense retrocardiac/left basilar airspace consolidation and small left-sided pleural effusion. Stable mild focal opacity at the right mid lung zone. Underlying COPD. Stable mild pulmonary vascular congestion and left-sided predominant parahilar opacity. Visualized osseous structures remain unchanged. 1. Stable multifocal airspace disease as detailed above with dense retrocardiac/left basilar airspace consolidation and small left-sided pleural effusion. Mild pulmonary vascular congestion. Findings may represent edema or multifocal pneumonia. 2. Underlying COPD. 3. Tubes and line as detailed above. Xr Chest Portable    Result Date: 4/15/2019  EXAMINATION: SINGLE XRAY VIEW OF THE CHEST 4/15/2019 6:47 am COMPARISON: 14 April 2019 HISTORY: ORDERING SYSTEM PROVIDED HISTORY: ETT placement TECHNOLOGIST PROVIDED HISTORY: ETT placement Ordering Physician Provided Reason for Exam: on vent Acuity: Acute Type of Exam: Initial FINDINGS: AP portable view of the chest time stamped at 612 hours demonstrates overlying cardiac monitoring electrodes. Endotracheal tube terminates 4 cm above the ron. Bipolar pacemaker enters from the left with intact leads in appropriate positions. Intestinal tube extends beyond the fundus of the stomach, tip not included. Right internal jugular catheter terminates at the cavoatrial junction. Heart size is normal.  Aortic arch is calcified. There is interval improvement in vascular congestion with resolution of perihilar opacities. Some bibasilar opacities remain. No extrapleural air is noted. Osseous structures are stable. Interval improvement in vascular congestion and bilateral opacities consistent with resolving pulmonary edema. Tubes and lines as above. Xr Chest Portable    Result Date: 4/14/2019  EXAMINATION: SINGLE XRAY VIEW OF THE CHEST 4/14/2019 7:57 am COMPARISON: Portable chest 04/13/2019.  HISTORY: ORDERING SYSTEM PROVIDED HISTORY: Intubation TECHNOLOGIST PROVIDED HISTORY: Intubation Ordering Physician Provided Reason for Exam: intubation Acuity: Acute Type of Exam: Initial Additional signs and symptoms: intubation FINDINGS: Endotracheal tube terminates over the midthoracic trachea. Dual-chamber pacemaker leads appear unchanged in position. Right IJ approach central venous catheter unchanged in position. Heart size not substantially changed. Perihilar and basilar opacities further increased. Left pleural effusion increased in size. Findings may reflect pulmonary edema, progressed from yesterday's exam.  Left pleural effusion increased in size. Xr Chest Portable    Result Date: 4/12/2019  EXAMINATION: SINGLE XRAY VIEW OF THE CHEST 4/12/2019 5:26 am COMPARISON: November 14, 2015. HISTORY: ORDERING SYSTEM PROVIDED HISTORY: line placement TECHNOLOGIST PROVIDED HISTORY: line placement Ordering Physician Provided Reason for Exam: New right side line placement. Acuity: Acute Type of Exam: Initial Additional signs and symptoms: New right side line placement. FINDINGS: Stable left pectoral trans venous cardiac pacer device. New right IJ central venous catheter with tip near the superior atrial caval junction. Normal lung volume. No new consolidation. Curvilinear radiopacity projecting over the right upper lobe likely represents artifact from a skin fold. No pleural effusion or pneumothorax. Stable cardiomediastinal silhouette and great vessels with redemonstration of atherosclerotic thoracic aorta. New right IJ central venous catheter with tip near the superior atrial caval junction. No pneumothorax. No new consolidation.      Vl Upper Extremity Bilateral Venous Duplex    Result Date: 4/22/2019    [facility]  Vascular Upper Extremities Veins Procedure   Patient Name   Baron Maxwell     Date of Study           04/22/2019                 Jeffery Navarrete   Date of Birth  1950  Gender                  Female   Age            77 year(s)  Race                       Room Number    2002        Height:                 59.84 inch, 152 cm   Corporate ID # [id number]    Weight:                 102 pounds, 46.3 kg Patient Acct # [de-identified]   BSA:        1.4 m^2     BMI:       20.03 kg/m^2   MR #           [id number]      Sonographer             Roderick Mario   Accession #    [id number]   Interpreting Physician  Kiara Raza   Referring                  Referring Physician     Audi Moya *  Nurse  Practitioner  Procedure Type of Study:   Veins: Upper Extremities Veins, Venous Scan Upper Bilateral.  Indications for Study:Swelling. Patient Status: In Patient. Technical Quality:Limited visualization. Limitation reason:Line placements. Conclusions   Summary   No evidence of superficial or deep venous thrombosis in both upper  extremities. Signature   ----------------------------------------------------------------  Electronically signed by Roderick Mario(Sonographer) on  04/22/2019 11:46 AM  ----------------------------------------------------------------   ----------------------------------------------------------------  Electronically signed by Amairani Oliveira(Interpreting  physician) on 04/22/2019 09:31 PM  ----------------------------------------------------------------  Findings:   Right Impression:                  Left Impression:  Internal jugular vein not          The internal jugular, subclavian,  visualized due to line placement. axillary, brachial, radial, and ulnar                                     veins demonstrate normal  Subclavian, axillary, brachial,    compressibility with phasic Doppler  radial, and ulnar veins            signals. demonstrate normal compressibility  with phasic Doppler signals. Normal compressibility of the cephalic                                     vein. Normal compressibility of the  cephalic vein. Normal compressibility of the basilic                                     vein. Normal compressibility of the  basilic vein.   Velocities are measured in cm/s ; Diameters are measured in cm Right UE Vein Measurements 2D Measurements +------------------------------------+----------+---------------+----------+ ! Location                            ! Visualized! Compressibility! Thrombosis! +------------------------------------+----------+---------------+----------+ ! Prox SCV                            ! Yes       ! Yes            ! None      ! +------------------------------------+----------+---------------+----------+ ! Dist SCV                            ! Yes       ! Yes            ! None      ! +------------------------------------+----------+---------------+----------+ ! Prox Axillary                       ! Yes       ! Yes            ! None      ! +------------------------------------+----------+---------------+----------+ ! Dist Axillary                       ! Yes       ! Yes            ! None      ! +------------------------------------+----------+---------------+----------+ ! Prox Brachial                       !Yes       ! Yes            ! None      ! +------------------------------------+----------+---------------+----------+ ! Dist Brachial                       !Yes       ! Yes            ! None      ! +------------------------------------+----------+---------------+----------+ ! Prox Radial                         !Yes       ! Yes            ! None      ! +------------------------------------+----------+---------------+----------+ ! Dist Radial                         !Yes       ! Yes            ! None      ! +------------------------------------+----------+---------------+----------+ ! Prox Ulnar                          ! Yes       ! Yes            ! None      ! +------------------------------------+----------+---------------+----------+ ! Dist Ulnar                          ! Yes       ! Yes            ! None      ! +------------------------------------+----------+---------------+----------+ ! Basilic at                        ! Yes       ! Yes            ! None      ! +------------------------------------+----------+---------------+----------+ ! Basleann at AF !None      ! +------------------------------------+----------+---------------+----------+ ! Prox SCV                            ! Yes       ! Yes            ! None      ! +------------------------------------+----------+---------------+----------+ ! Dist SCV                            ! Yes       ! Yes            ! None      ! +------------------------------------+----------+---------------+----------+ ! Prox Axillary                       ! Yes       ! Yes            ! None      ! +------------------------------------+----------+---------------+----------+ ! Dist Axillary                       ! Yes       ! Yes            ! None      ! +------------------------------------+----------+---------------+----------+ ! Prox Brachial                       !Yes       ! Yes            ! None      ! +------------------------------------+----------+---------------+----------+ ! Dist Brachial                       !Yes       ! Yes            ! None      ! +------------------------------------+----------+---------------+----------+ ! Prox Radial                         !Yes       ! Yes            ! None      ! +------------------------------------+----------+---------------+----------+ ! Dist Radial                         !Yes       ! Yes            ! None      ! +------------------------------------+----------+---------------+----------+ ! Prox Ulnar                          ! Yes       ! Yes            ! None      ! +------------------------------------+----------+---------------+----------+ ! Dist Ulnar                          ! Yes       ! Yes            ! None      ! +------------------------------------+----------+---------------+----------+ ! Basilic at UA                       ! Yes       ! Yes            ! None      ! +------------------------------------+----------+---------------+----------+ ! Gideon at UA                      ! Yes       ! Yes            ! None      ! +------------------------------------+----------+---------------+----------+ ! Gideon was attached to gravity drainage. FINDINGS: Ultrasound image demonstrates distended gallbladder. Bilious fluid was sent for culture. Successful placement of transhepatic 8 Frisian percutaneous cholecystostomy tube. Nm Hepatobiliary Scan W Ejection Fraction    Result Date: 4/20/2019  EXAMINATION: NUCLEAR MEDICINE HEPATOBILIARY SCINTIGRAPHY (HIDA SCAN). 4/20/2019 2:15 pm TECHNIQUE: Approximately 5.6 [id number] Mebrofenin (Choletec) was administered IV. Then, dynamic images of the abdomen were obtained in the anterior projection for 60 mins. A right lateral view was also obtained at 60 mins. Delayed images up to 4 hours were obtained. COMPARISON: Ultrasound 04/19/2019 HISTORY: ORDERING SYSTEM PROVIDED HISTORY: CHOLECYSTITIS TECHNOLOGIST PROVIDED HISTORY: Ordering Physician Provided Reason for Exam: Cholecystitis Acuity: Unknown Type of Exam: Unknown FINDINGS: Prompt, homogenous uptake by the liver is noted with normal appearance of radiotracer excretion into the biliary system. Clearance of bloodpool activity appears appropriate. Normal small bowel activity is seen. Prominent activity within the common bile duct. The gallbladder is not visualized by the end of the exam.     Absent filling of the gallbladder consistent with acute cholecystitis. Physical Examination:        Physical Exam   Constitutional: She is oriented to person, place, and time. No distress. Eyes:   Pale conjunctiva    Neck: Neck supple. Cardiovascular: Regular rhythm and normal heart sounds. tachycardic   Pulmonary/Chest: Effort normal and breath sounds normal.   Abdominal: Soft. Bowel sounds are normal. There is tenderness (palpation of suprapubic ). cholescystomy draining well    Musculoskeletal: She exhibits no edema or tenderness. Neurological: She is alert and oriented to person, place, and time. Skin: Skin is warm and dry. Nursing note and vitals reviewed.         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]     Septic shock ([address] [A41.9, R65.21]     Leukemoid reaction [D72.823]     Aspiration pneumonia of right lower lobe due to vomit ([address] [J69.0]     MRSA carrier [Z22.322]     Sepsis due to urinary tract infection ([address] [A41.9, N39.0] 04/11/2019    Cystitis [N30.90] 04/11/2019       Plan:        Sepsis secondary to e.coli, emphysematous cystitis - resolved   WBC wnl, crp trending down   Diflucan   Urology consulted - appreciate recs               -dyer dced (4/30)  Gen surg consulted - appreciate recs  ID consulted - appreciate recs                Diflucan until 5/3  PICC line placed  5/1  Cholecystotomy tube 4/22   Lasix continued - monitor urine output       GI Bleed  Bright red stool  Lovenox held  Hb 6.7   Transfuse RBC   GI consulted - appreciate recs      Pneumonia +MRSA   CXR: continued bilateral effusions, bibasilar opactiies/bilateral interstitial opacites in upper lobes   Cont. abx per ID   Pulm consulted - appreciate recs   -wean to prednisone 10mg daily      Sinus Tachycardia, improved   Stable  Hx of CBA, bradycardia with pacemaker   Lopressor 12.5 BID   Cont.  To monitor      Acute cholecystitis   General surgery consulted - appreciate recs   Surgical intervention: cholecystomy tube 4/22   Cholecystectomy in patient?      Gastroparesis   Per GI: protonix  Reglan started on 4/17, daily EKG  Considering systemic autonomic dysfunction as overlying diagnosis (gastroparesis, neurogenic bladder, hypotension/fluctuating Bps)   TPN GS to advance diet     Oral Candidiasis      Anemia, most likely 2/2 bleeding   Transfused               1 unit 4/14              2 unit 4/16     Thrombocytopenia - improved  Platelets 728  Cont. lovenox  Cont. To monitor      Hypokalemia   Potassium sliding scale   3.4     Hypophosphatemia   Sodium phosphate prn     Left knee remote distal tibia fx w/ osteopenia   Ortho removed brace     Maintenance              Lovenox              Dulera, Xopenex, Symbicort, Atrovent              Continue home meds trazodone, ASA, Plavix, Norvasc, Effexor, Spiriva     Dispo  PT/OT eval and treat   Social work Tracy Fajardo MD  5/3/2019  5:14 PM     Attending Physician Statement    I have discussed the case of Yoselin Ayala, including pertinent history and exam findings with the resident. I have seen and examined the patient and the key elements of the encounter have been performed by me. I agree with the assessment, plan, and orders as documented by the resident. He did have a drop in her hemoglobin and nuclear study did not show any source of bleeding. Is my impression that she has mucosal bleeding secondary to Plavix and Lovenox.   At present her clinical condition is stable  Electronically signed by Dilma Miller MD on 5/3/2019 at 5:15 PM

## 2019-05-03 NOTE — PROGRESS NOTES
Patient seen earlier today  Afebrile. Vitals are stable  Continues to have very poor appetite  Oral intake is minimal  Regular rate and rhythm  Lungs clear, diminished at the bases  Abdomen soft. Nondistended. Minimally tender.   Cholecystostomy tube is draining well  No peripheral edema  Labs reviewed    GI to evaluate persistent left upper quadrant discomfort as well as melanotic stool  Cholecystectomy when more stable

## 2019-05-04 LAB
ABSOLUTE BANDS #: 0.72 K/UL (ref 0–1)
ABSOLUTE EOS #: 0.1 K/UL (ref 0–0.4)
ABSOLUTE IMMATURE GRANULOCYTE: ABNORMAL K/UL (ref 0–0.3)
ABSOLUTE LYMPH #: 0.52 K/UL (ref 1–4.8)
ABSOLUTE MONO #: 0.62 K/UL (ref 0.1–1.3)
ALBUMIN SERPL-MCNC: 1.6 G/DL (ref 3.5–5.2)
ALBUMIN/GLOBULIN RATIO: ABNORMAL (ref 1–2.5)
ALP BLD-CCNC: 117 U/L (ref 35–104)
ALT SERPL-CCNC: 14 U/L (ref 5–33)
ANION GAP SERPL CALCULATED.3IONS-SCNC: 9 MMOL/L (ref 9–17)
AST SERPL-CCNC: 18 U/L
BANDS: 7 % (ref 0–10)
BASOPHILS # BLD: 0 % (ref 0–2)
BASOPHILS ABSOLUTE: 0 K/UL (ref 0–0.2)
BILIRUB SERPL-MCNC: 0.46 MG/DL (ref 0.3–1.2)
BUN BLDV-MCNC: 23 MG/DL (ref 8–23)
BUN/CREAT BLD: ABNORMAL (ref 9–20)
C-REACTIVE PROTEIN: 141.4 MG/L (ref 0–5)
CALCIUM SERPL-MCNC: 7.6 MG/DL (ref 8.6–10.4)
CHLORIDE BLD-SCNC: 98 MMOL/L (ref 98–107)
CO2: 26 MMOL/L (ref 20–31)
CREAT SERPL-MCNC: <0.4 MG/DL (ref 0.5–0.9)
DIFFERENTIAL TYPE: ABNORMAL
EOSINOPHILS RELATIVE PERCENT: 1 % (ref 0–4)
GFR AFRICAN AMERICAN: ABNORMAL ML/MIN
GFR NON-AFRICAN AMERICAN: ABNORMAL ML/MIN
GFR SERPL CREATININE-BSD FRML MDRD: ABNORMAL ML/MIN/{1.73_M2}
GFR SERPL CREATININE-BSD FRML MDRD: ABNORMAL ML/MIN/{1.73_M2}
GLUCOSE BLD-MCNC: 102 MG/DL (ref 70–99)
GLUCOSE BLD-MCNC: 106 MG/DL (ref 65–105)
GLUCOSE BLD-MCNC: 112 MG/DL (ref 65–105)
GLUCOSE BLD-MCNC: 119 MG/DL (ref 65–105)
GLUCOSE BLD-MCNC: 121 MG/DL (ref 65–105)
GLUCOSE BLD-MCNC: 124 MG/DL (ref 65–105)
GLUCOSE BLD-MCNC: 133 MG/DL (ref 65–105)
HCT VFR BLD CALC: 22.4 % (ref 36–46)
HCT VFR BLD CALC: 23.2 % (ref 36–46)
HCT VFR BLD CALC: 23.9 % (ref 36–46)
HCT VFR BLD CALC: 24.8 % (ref 36–46)
HEMOGLOBIN: 7.5 G/DL (ref 12–16)
HEMOGLOBIN: 7.6 G/DL (ref 12–16)
HEMOGLOBIN: 7.8 G/DL (ref 12–16)
HEMOGLOBIN: 8.1 G/DL (ref 12–16)
IMMATURE GRANULOCYTES: ABNORMAL %
INR BLD: 1.2
LYMPHOCYTES # BLD: 5 % (ref 24–44)
MAGNESIUM: 1.7 MG/DL (ref 1.6–2.6)
MCH RBC QN AUTO: 28.8 PG (ref 26–34)
MCHC RBC AUTO-ENTMCNC: 32.8 G/DL (ref 31–37)
MCV RBC AUTO: 87.9 FL (ref 80–100)
METAMYELOCYTES ABSOLUTE COUNT: 0.21 K/UL
METAMYELOCYTES: 2 %
MONOCYTES # BLD: 6 % (ref 1–7)
MORPHOLOGY: ABNORMAL
NRBC AUTOMATED: ABNORMAL PER 100 WBC
PDW BLD-RTO: 16.3 % (ref 11.5–14.9)
PLATELET # BLD: 172 K/UL (ref 150–450)
PLATELET ESTIMATE: ABNORMAL
PMV BLD AUTO: 9.8 FL (ref 6–12)
POTASSIUM SERPL-SCNC: 4.4 MMOL/L (ref 3.7–5.3)
PROCALCITONIN: 0.1 NG/ML
PROTHROMBIN TIME: 15.1 SEC (ref 11.8–14.6)
RBC # BLD: 2.64 M/UL (ref 4–5.2)
RBC # BLD: ABNORMAL 10*6/UL
SEDIMENTATION RATE, ERYTHROCYTE: 71 MM (ref 0–20)
SEG NEUTROPHILS: 79 % (ref 36–66)
SEGMENTED NEUTROPHILS ABSOLUTE COUNT: 8.13 K/UL (ref 1.3–9.1)
SODIUM BLD-SCNC: 133 MMOL/L (ref 135–144)
TOTAL PROTEIN: 4.6 G/DL (ref 6.4–8.3)
WBC # BLD: 10.3 K/UL (ref 3.5–11)
WBC # BLD: ABNORMAL 10*3/UL

## 2019-05-04 PROCEDURE — 85610 PROTHROMBIN TIME: CPT

## 2019-05-04 PROCEDURE — 85651 RBC SED RATE NONAUTOMATED: CPT

## 2019-05-04 PROCEDURE — 99232 SBSQ HOSP IP/OBS MODERATE 35: CPT | Performed by: INTERNAL MEDICINE

## 2019-05-04 PROCEDURE — 83735 ASSAY OF MAGNESIUM: CPT

## 2019-05-04 PROCEDURE — 6370000000 HC RX 637 (ALT 250 FOR IP): Performed by: INTERNAL MEDICINE

## 2019-05-04 PROCEDURE — 6360000002 HC RX W HCPCS: Performed by: INTERNAL MEDICINE

## 2019-05-04 PROCEDURE — 85018 HEMOGLOBIN: CPT

## 2019-05-04 PROCEDURE — 2700000000 HC OXYGEN THERAPY PER DAY

## 2019-05-04 PROCEDURE — 2060000000 HC ICU INTERMEDIATE R&B

## 2019-05-04 PROCEDURE — 36415 COLL VENOUS BLD VENIPUNCTURE: CPT

## 2019-05-04 PROCEDURE — APPNB30 APP NON BILLABLE TIME 0-30 MINS: Performed by: NURSE PRACTITIONER

## 2019-05-04 PROCEDURE — 85014 HEMATOCRIT: CPT

## 2019-05-04 PROCEDURE — 94761 N-INVAS EAR/PLS OXIMETRY MLT: CPT

## 2019-05-04 PROCEDURE — 85025 COMPLETE CBC W/AUTO DIFF WBC: CPT

## 2019-05-04 PROCEDURE — 2500000003 HC RX 250 WO HCPCS: Performed by: SURGERY

## 2019-05-04 PROCEDURE — 80053 COMPREHEN METABOLIC PANEL: CPT

## 2019-05-04 PROCEDURE — 86140 C-REACTIVE PROTEIN: CPT

## 2019-05-04 PROCEDURE — 94640 AIRWAY INHALATION TREATMENT: CPT

## 2019-05-04 PROCEDURE — 84145 PROCALCITONIN (PCT): CPT

## 2019-05-04 PROCEDURE — 82947 ASSAY GLUCOSE BLOOD QUANT: CPT

## 2019-05-04 PROCEDURE — 6370000000 HC RX 637 (ALT 250 FOR IP): Performed by: STUDENT IN AN ORGANIZED HEALTH CARE EDUCATION/TRAINING PROGRAM

## 2019-05-04 PROCEDURE — 6370000000 HC RX 637 (ALT 250 FOR IP): Performed by: NURSE PRACTITIONER

## 2019-05-04 PROCEDURE — 2580000003 HC RX 258: Performed by: INTERNAL MEDICINE

## 2019-05-04 PROCEDURE — 2500000003 HC RX 250 WO HCPCS: Performed by: STUDENT IN AN ORGANIZED HEALTH CARE EDUCATION/TRAINING PROGRAM

## 2019-05-04 RX ORDER — ZOLPIDEM TARTRATE 5 MG/1
5 TABLET ORAL NIGHTLY PRN
Status: DISCONTINUED | OUTPATIENT
Start: 2019-05-04 | End: 2019-05-15

## 2019-05-04 RX ORDER — ONDANSETRON 2 MG/ML
4 INJECTION INTRAMUSCULAR; INTRAVENOUS EVERY 6 HOURS PRN
Status: DISCONTINUED | OUTPATIENT
Start: 2019-05-04 | End: 2019-05-15

## 2019-05-04 RX ADMIN — OXYCODONE AND ACETAMINOPHEN 1 TABLET: 5; 325 TABLET ORAL at 04:20

## 2019-05-04 RX ADMIN — VENLAFAXINE 37.5 MG: 37.5 TABLET ORAL at 20:23

## 2019-05-04 RX ADMIN — CALCIUM GLUCONATE: 94 INJECTION, SOLUTION INTRAVENOUS at 18:14

## 2019-05-04 RX ADMIN — VENLAFAXINE 37.5 MG: 37.5 TABLET ORAL at 15:57

## 2019-05-04 RX ADMIN — PANTOPRAZOLE SODIUM 40 MG: 40 TABLET, DELAYED RELEASE ORAL at 04:21

## 2019-05-04 RX ADMIN — METOPROLOL TARTRATE 12.5 MG: 25 TABLET ORAL at 09:12

## 2019-05-04 RX ADMIN — OXYCODONE AND ACETAMINOPHEN 1 TABLET: 5; 325 TABLET ORAL at 20:23

## 2019-05-04 RX ADMIN — Medication 10 ML: at 09:13

## 2019-05-04 RX ADMIN — POLYETHYLENE GLYCOL-3350 AND ELECTROLYTES 2000 ML: 236; 6.74; 5.86; 2.97; 22.74 POWDER, FOR SOLUTION ORAL at 15:58

## 2019-05-04 RX ADMIN — Medication 10 ML: at 20:23

## 2019-05-04 RX ADMIN — I.V. FAT EMULSION 100 ML: 20 EMULSION INTRAVENOUS at 18:13

## 2019-05-04 RX ADMIN — VENLAFAXINE 37.5 MG: 37.5 TABLET ORAL at 09:13

## 2019-05-04 RX ADMIN — Medication 2 PUFF: at 14:25

## 2019-05-04 RX ADMIN — ZOLPIDEM TARTRATE 5 MG: 5 TABLET ORAL at 23:26

## 2019-05-04 RX ADMIN — LEVALBUTEROL 1.25 MG: 1.25 SOLUTION, CONCENTRATE RESPIRATORY (INHALATION) at 07:36

## 2019-05-04 RX ADMIN — ASPIRIN 81 MG: 81 TABLET, COATED ORAL at 09:11

## 2019-05-04 RX ADMIN — PREDNISONE 10 MG: 10 TABLET ORAL at 09:12

## 2019-05-04 RX ADMIN — METOPROLOL TARTRATE 12.5 MG: 25 TABLET ORAL at 20:24

## 2019-05-04 RX ADMIN — FUROSEMIDE 40 MG: 10 INJECTION, SOLUTION INTRAMUSCULAR; INTRAVENOUS at 09:12

## 2019-05-04 RX ADMIN — OXYCODONE AND ACETAMINOPHEN 1 TABLET: 5; 325 TABLET ORAL at 14:25

## 2019-05-04 RX ADMIN — IPRATROPIUM BROMIDE 0.5 MG: 0.5 SOLUTION RESPIRATORY (INHALATION) at 07:35

## 2019-05-04 RX ADMIN — Medication 2 PUFF: at 20:23

## 2019-05-04 ASSESSMENT — PAIN DESCRIPTION - PAIN TYPE
TYPE: CHRONIC PAIN

## 2019-05-04 ASSESSMENT — PAIN SCALES - GENERAL
PAINLEVEL_OUTOF10: 8
PAINLEVEL_OUTOF10: 0
PAINLEVEL_OUTOF10: 9
PAINLEVEL_OUTOF10: 5
PAINLEVEL_OUTOF10: 7
PAINLEVEL_OUTOF10: 10

## 2019-05-04 ASSESSMENT — ENCOUNTER SYMPTOMS
CONSTIPATION: 0
ABDOMINAL PAIN: 0
COUGH: 0
VOMITING: 0
NAUSEA: 0
DIARRHEA: 0
SHORTNESS OF BREATH: 0

## 2019-05-04 ASSESSMENT — PAIN DESCRIPTION - DESCRIPTORS
DESCRIPTORS: PATIENT UNABLE TO DESCRIBE
DESCRIPTORS: PATIENT UNABLE TO DESCRIBE

## 2019-05-04 ASSESSMENT — PAIN DESCRIPTION - FREQUENCY
FREQUENCY: CONTINUOUS
FREQUENCY: CONTINUOUS

## 2019-05-04 ASSESSMENT — PAIN DESCRIPTION - LOCATION
LOCATION: GENERALIZED

## 2019-05-04 ASSESSMENT — PAIN DESCRIPTION - PROGRESSION
CLINICAL_PROGRESSION: NOT CHANGED
CLINICAL_PROGRESSION: NOT CHANGED

## 2019-05-04 ASSESSMENT — PAIN DESCRIPTION - ONSET
ONSET: ON-GOING
ONSET: ON-GOING

## 2019-05-04 ASSESSMENT — PAIN DESCRIPTION - ORIENTATION
ORIENTATION: LEFT
ORIENTATION: LEFT

## 2019-05-04 NOTE — PLAN OF CARE
Case discussed with Dr Ghassan Hall clear diet today and tomorrow, will plan for colonoscopy Monday, half of the prep today and the other half tomorrow . Queta Mcnair, AGAPITO - CNP

## 2019-05-04 NOTE — PROGRESS NOTES
[address] [facility].    PROGRESS NOTE             5/4/2019    9:52 AM    Name:   Fercho Esquivel  MRN:     [id number]     Acct:      [de-identified]   Room:   [facility] [place] [address] Day:  23  Admit Date:  4/11/2019  2:48 PM    PCP:  Drew Lemus DO  Code Status:  Full Code    Subjective:     C/C:   Chief Complaint   Patient presents with    Abdominal Pain     Interval History Status: not changed. Patient seen bedside on progressive. No acute events overnight. VSS. Patient continues to have gross bloody stools. NM GI scan did not show active bleeds (05/03). Hgb: 8.4 -> 7.6  GI following - will appreciate recs. Brief History:       The patient is a 68 y.o.  presents sepsis secondary to acute cystitis. Review of Systems:     Review of Systems   Constitutional: Positive for fatigue. Negative for chills and fever. Respiratory: Negative for cough and shortness of breath. Cardiovascular: Negative for chest pain, palpitations and leg swelling. Gastrointestinal: Negative for abdominal pain, constipation, diarrhea, nausea and vomiting. Genitourinary: Negative for dysuria. Musculoskeletal: Negative for myalgias. Neurological: Negative for weakness, light-headedness and headaches. All other systems reviewed and are negative. Medications: Allergies:     Allergies   Allergen Reactions    Penicillins Shortness Of Breath and Rash    Amoxicillin        Current Meds:   Scheduled Meds:    sodium chloride  250 mL Intravenous Once    sodium chloride  250 mL Intravenous Once    predniSONE  10 mg Oral Daily    mometasone-formoterol  2 puff Inhalation BID    pantoprazole  40 mg Oral QAM AC    metoprolol tartrate  12.5 mg Oral BID    fat emulsion  100 mL Intravenous Daily    furosemide  40 mg Intravenous Daily    magnesium sulfate  1 g Intravenous Once    ipratropium  0.5 mg Nebulization Q4H    insulin lispro  0-12 Units Subcutaneous Q6H    levalbuterol  1.25 mg Nebulization Q4H    venlafaxine  37.5 mg Oral TID    aspirin  81 mg Oral Daily    sodium chloride flush  10 mL Intravenous 2 times per day     Continuous Infusions:    PN-Adult 2-in-1 Central Line (Standard) 65 mL/hr at 19 1751    sodium chloride 10 mL/hr at 19 0029    dextrose       PRN Meds: sodium chloride flush, hydrOXYzine, metoprolol, sodium chloride nebulizer, fentanNYL, oxyCODONE-acetaminophen, magnesium sulfate, sodium phosphate IVPB **OR** sodium phosphate IVPB, potassium chloride **OR** potassium alternative oral replacement **OR** potassium chloride, glucose, dextrose, glucagon (rDNA), dextrose, milk and molasses, sodium chloride flush, acetaminophen    Data:     Past Medical History:   has a past medical history of Abnormal computed tomography of cervical spine, CVA (cerebral vascular accident) ([address] GERD (gastroesophageal reflux disease), Hypertension, Paraproteinemia, and Weight loss. Social History:   reports that she has been smoking. She has a 30.00 pack-year smoking history. She has never used smokeless tobacco. She reports that she drank about 16.8 oz of alcohol per week. She reports that she does not use drugs. Family History: History reviewed. No pertinent family history. Vitals:  /63   Pulse 92   Temp 97.3 °F (36.3 °C) (Oral)   Resp 16   Ht 5' (1.524 m)   Wt 111 lb 8.8 oz (50.6 kg)   SpO2 92%   BMI 21.79 kg/m²   Temp (24hrs), Av.1 °F (36.7 °C), Min:97.3 °F (36.3 °C), Max:99.2 °F (37.3 °C)    Recent Labs     19  1605 19  2348 19  0442 19  0714   POCGLU 101 113* 106* 119*       I/O(24Hr):     Intake/Output Summary (Last 24 hours) at 2019 0952  Last data filed at 2019 0545  Gross per 24 hour   Intake 1422.5 ml   Output 560 ml   Net 862.5 ml       Labs:    Lab Results   Component Value Date    WBC 10.3 2019    HGB 7.6 (L) 2019    HCT 23.2 (L) 2019    MCV 87.9 via nurse through NG tube. Acuity: Unknown Type of Exam: Unknown Relevant Medical/Surgical History: Hx - Sepsis due to urinary tract infection. FINDINGS: Lower Chest: New moderate layering bilateral pleural effusions with bilateral lower lobe atelectasis. Organs: Limited evaluation due lack of intravenous contrast.  Cholelithiasis redemonstrated. No gallbladder wall thickening or biliary ductal dilatation. Scattered tiny hypodense lesions in the liver are too small to characterize but statistically represent benign cysts or hemangiomas and appear unchanged. The pancreas, spleen, adrenal glands, and kidneys are unremarkable. There is no hydronephrosis or urinary tract calculus. GI/Bowel: The stomach is distended. Enteric tube is in place. No contrast is seen distal to the pylorus and there is contrast reflux into the distal esophagus. There is no evidence of bowel obstruction. The appendix is not definitely visualized. No focal pericecal inflammatory changes are evident. Pelvis: The urinary bladder is decompressed by Tran catheter. No pelvic mass is seen. Peritoneum/Retroperitoneum: Small amount of free fluid in the pelvic cavity. No free air or focal fluid collection. No abnormal lymph node. Normal abdominal aortic caliber. Moderate calcific atherosclerosis. Bones/Soft Tissues: No acute osseous abnormality. Diffuse anasarca. Moderate degenerative changes in the lumbar spine. 1. Distended, contrast filled stomach with reflux of contrast into the esophagus. No enteric contrast is seen distal to the pylorus suggesting delayed gastric emptying in the setting of ileus. 2. New moderate layering bilateral pleural effusions with bilateral lower lobe atelectasis. 3. Small amount of nonspecific free fluid in the pelvic cavity. 4. Anasarca. 5. Cholelithiasis.      Xr Chest (single View Frontal)    Result Date: 5/2/2019  EXAMINATION: SINGLE XRAY VIEW OF THE CHEST 5/2/2019 6:34 am COMPARISON: 29 April 2019 HISTORY: ORDERING SYSTEM PROVIDED HISTORY: COPD TECHNOLOGIST PROVIDED HISTORY: COPD Ordering Physician Provided Reason for Exam: COPD Acuity: Acute Type of Exam: Initial FINDINGS: AP portable view of the chest time stamped at 630 hours demonstrates stable cardiac size. Overlying cardiac monitoring electrodes are present. Right-sided PICC line terminates in the right atrium. Bipolar pacemaker enters from the left with intact leads in appropriate positions. There is been no significant interval change in bilateral interstitial opacities, representing interval change since 2015. This may be related to worsening interstitial disease or superimposed venous congestion. Bilateral effusions are noted with bibasilar opacities, either atelectasis or edema. Continued bilateral effusions, bibasilar opacities and bilateral interstitial opacities in the upper lobes. Findings may be related to worsening interstitial disease and edema. Airspace disease is not excluded. Xr Chest (single View Frontal)    Result Date: 4/13/2019  EXAMINATION: SINGLE XRAY VIEW OF THE CHEST 4/13/2019 7:18 am COMPARISON: April 12, 2019 HISTORY: ORDERING SYSTEM PROVIDED HISTORY: dyspnea TECHNOLOGIST PROVIDED HISTORY: dyspnea Ordering Physician Provided Reason for Exam: dyspnea Acuity: Acute Type of Exam: Subsequent/Follow-up Additional signs and symptoms: dyspnea FINDINGS: A right IJ catheter is seen with its tip terminating at the superior cavoatrial junction. The left chest wall pacemaker and leads are stable. The cardiomediastinal silhouette is stable. There is interval increased opacity at the right lung base, may be related to atelectasis versus pneumonia. There is small atelectasis and mild pleural effusion at the left lung base. There is no pneumothorax. There is no acute osseous abnormality. Interval increased opacity at the right lung base, may be related to atelectasis versus pneumonia.  Small atelectasis and mild pleural effusion at the left lung, new since the prior study. Xr Chest Standard (2 Vw)    Result Date: 4/29/2019  EXAMINATION: TWO VIEWS OF THE CHEST 4/29/2019 8:04 pm COMPARISON: 04/27/2019 HISTORY: ORDERING SYSTEM PROVIDED HISTORY: Follow-up lung infiltrate TECHNOLOGIST PROVIDED HISTORY: Follow-up lung infiltrate Ordering Physician Provided Reason for Exam: Follow-up infiltrate. Pt unable to lean forward - unable to get proper sponge behind pt for lateral. Pt unable to raise left arm at all for lateral. Best possible lateral obtained. Acuity: Unknown Type of Exam: Unknown Additional signs and symptoms: Follow-up infiltrate. Pt unable to lean forward - unable to get proper sponge behind pt for lateral. Pt unable to raise left arm at all for lateral. Best possible lateral obtained. FINDINGS: Left chest cardiac pacemaker device is in place. Right IJ central venous catheter in place with distal tip at the cavoatrial junction. Heart size is within normal limits. No vascular congestion. There are small to moderate pleural effusions. There are interstitial opacities in the upper lungs, which could be related to scarring and/or developing infiltrate, slightly improved in appearance when compared to 04/27/2019. No evidence of pneumothorax. 1.  Small to moderate bilateral pleural effusions, better visualized on lateral view. 2.  Upper lobe interstitial opacities, slightly improved when compared to the previous study, possibly related to underlying fibrotic change or residual infiltrate.      Xr Abdomen (kub) (single Ap View)    Result Date: 4/19/2019  EXAMINATION: SINGLE SUPINE XRAY VIEW(S) OF THE ABDOMEN 4/19/2019 9:48 am COMPARISON: April 14, 2019 HISTORY: ORDERING SYSTEM PROVIDED HISTORY: pain TECHNOLOGIST PROVIDED HISTORY: pain Ordering Physician Provided Reason for Exam: Abdominal pain and distentsion Acuity: Acute Type of Exam: Initial Additional signs and symptoms: Abdominal pain and distentsion FINDINGS: Enteric tube with the tip within the stomach. Small left pleural effusion. Minimal right pleural effusion. Mildly dilated loops of bowel. Nonspecific bowel gas pattern with mildly dilated loops of bowel. Xr Abdomen (kub) (single Ap View)    Result Date: 4/14/2019  EXAMINATION: SINGLE SUPINE XRAY VIEW(S) OF THE ABDOMEN 4/14/2019 7:39 am COMPARISON: CT abdomen and pelvis  film from 11 April 2019 HISTORY: [address]: Abd Distention TECHNOLOGIST PROVIDED HISTORY: Abd Distention Ordering Physician Provided Reason for Exam: Abdominal distention. Pt was moving around in the bed. Best films at present time. Acuity: Acute Type of Exam: Initial Additional signs and symptoms: Abdominal distention. Pt was moving around in the bed. Best films at present time. FINDINGS: Portable view time stamped at 748 hours demonstrates an intestinal tube terminating in the midportion of a gaseous Spike dilated stomach. Densities are present over the stomach likely medication. Bipolar pacemaker is in situ with intact leads. Heart size is top-normal, stable. Gaseous distension of the stomach and loop of bowel in the upper mid abdomen is noted but there is gas and fecal material in the rectum. Gastric outlet obstruction or proximal small bowel partial obstruction is suspected. No free air is noted. Midline city is present over the pelvis likely a monitor or CT small bore catheter. Vascular calcification is present in the pelvis. Persistent preferential gaseous distension of the stomach although there is some gas in the upper abdomen and gas and fecal material present in the rectosigmoid. Findings suggest gastric outlet obstruction.      Nm Gi Blood Loss    Result Date: 5/3/2019  EXAMINATION: NUCLEAR MEDICINE GASTRIC BLEEDING STUDY 5/3/2019 TECHNIQUE: Following the intravenous injection of 23.8 mCi of 99 mTc-labeled RBC's, a flow study and standard images of the abdomen was obtained over a total period of 60 minutes COMPARISON: None. HISTORY: ORDERING SYSTEM PROVIDED HISTORY: GI BLEEDING TECHNOLOGIST PROVIDED HISTORY: Ordering Physician Provided Reason for Exam: GI bleeding Acuity: Unknown Type of Exam: Unknown FINDINGS: Activity is seen within the blood pool, including the great vessels, heart, liver, and spleen. There was no abnormal accumulation of radioactivity in the abdomen to suggest active bleeding during study acquisition. No evidence of active GI bleeding during acquisition. RECOMMENDATIONS: If the patient shows hemodynamic signs of an active bleed in the next 20 hours, additional images can be acquired. Ct Abdomen Pelvis W Iv Contrast    Result Date: 4/11/2019  EXAMINATION: CT OF THE ABDOMEN AND PELVIS WITH CONTRAST 4/11/2019 5:14 pm TECHNIQUE: CT of the abdomen and pelvis was performed with the administration of intravenous contrast. Multiplanar reformatted images are provided for review. Dose modulation, iterative reconstruction, and/or weight based adjustment of the mA/kV was utilized to reduce the radiation dose to as low as reasonably achievable. COMPARISON: None. HISTORY: ORDERING SYSTEM PROVIDED HISTORY: Abdominal pain TECHNOLOGIST PROVIDED HISTORY: IV Only Contrast Ordering Physician Provided Reason for Exam: patient c/o abd pain for an hour FINDINGS: Lower Chest: Trace pleural fluid bilaterally is noted. Indwelling cardiac pacemaker is present. Heart size is normal.  Coronary artery calcifications are evident. The esophagus is significantly dilated and fluid-filled. No paraesophageal adenopathy is evident. Organs: The spleen, pancreas, and adrenals are unremarkable. The liver contains hypodensities, likely cysts. The gallbladder is distended and contains a solitary gallstone. The kidneys excrete contrast bilaterally. Extrarenal collecting systems are noted.   The ureters are mildly dilated down to the urinary bladder without evidence of intraluminal or ureteral obstructing calculi. GI/Bowel: Marked distention of the stomach is noted; this continues to the pylorus with appearance suggesting partial gastric outlet obstruction. Fluid is present in some small bowel loops and colon distal to the stomach. No small bowel obstruction. Pelvis: Marked distention of the urinary bladder is noted. 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 Distended ureters may be related to reflux or infection. No free pelvic fluid, pelvic or inguinal adenopathy is noted. Peritoneum/Retroperitoneum: No aortic aneurysm. Mild to moderate plaque is noted in the infrarenal abdominal aorta and both proximal iliac arteries. Shotty lymph nodes are present around the aorta in the upper abdomen. No mesenteric adenopathy is noted. Bones/Soft Tissues: Degenerative changes are present in the hips and lower lumbar facets. Estimated biologic radiation dose for this procedure:258.77 mGy/cm2.     1. Dilatation of the thoracic esophagus filled with fluid. No obstructive process is noted. No adjacent enlarged lymph nodes. 2. Trace pleural fluid. 3. Marked distention of the stomach. This appears to extend to the pylorus. Partial gastric outlet obstruction is not excluded. 4. Bilateral dilated ureters without obstructing calculi. Urinary bladder is markedly distended with bladder wall air suggesting emphysematous cystitis. Dilatation of the ureters may be related to reflux or infection. 5. Atherosclerotic disease. 6. Other findings as above. Critical results were called by Dr. Helen Bingham MD to [address] on 4/11/2019 at 17:37. Ir Gib Rahulnes Device Plmt/replace/removal    Result Date: 4/30/2019  PROCEDURE: ULTRASOUND GUIDED VASCULAR ACCESS. FLUOROSCOPY GUIDED PICC PLACEMENT 4/30/2019.  HISTORY: ORDERING SYSTEM PROVIDED HISTORY: TPN TECHNOLOGIST PROVIDED HISTORY: TPN Lumen?->Double Lumen SEDATION: None FLUOROSCOPY DOSE AND TYPE OR TIME AND EXPOSURES: 7 seconds; D  TECHNIQUE: This procedure was performed by Dr. Milagros Tony. Informed consent was obtained after a detailed explanation of the procedure including risks, benefits, and alternatives. Universal protocol was observed. The right arm was prepped and draped in sterile fashion using maximum sterile barrier technique. Local anesthesia was achieved with lidocaine. A micropuncture needle was used to access the right basilic vein using ultrasound guidance. An ultrasound image demonstrating patency of the vein with needle tip located within it. An image was obtained and stored in PACs. A 0.018 guidewire was used to place a peel-a-way sheath and a 5 Khmer dual-lumen PICC was advanced with fluoroscopic guidance with the tip at the cavo-atrial junction. The catheter flushed easily and there was a good blood return. The catheter was secured to the skin. The patient tolerated the procedure well and there were no immediate complications. FINDINGS: Fluoroscopic image demonstrates the tip of the catheter at the cavo-atrial junction. Successful ultrasound and fluoroscopy guided PICC placement     Us Gallbladder Ruq    Result Date: 4/19/2019  EXAMINATION: RIGHT UPPER QUADRANT ULTRASOUND 4/19/2019 10:48 am COMPARISON: CT abdomen and pelvis 4/14/2018 HISTORY: ORDERING SYSTEM PROVIDED HISTORY: ABDOMINAL PAIN FINDINGS: Pancreas is not well visualized. No liver lesion. Gallbladder wall thickening. Gallbladder sludge. Cholelithiasis. Pericholecystic fluid. Common bile duct measures at the upper limits of normal at 7 mm. Findings suggesting acute cholecystitis.      Xr Chest Portable    Result Date: 4/27/2019  EXAMINATION: SINGLE XRAY VIEW OF THE CHEST 4/27/2019 8:47 am COMPARISON: 04/26/2019 HISTORY: ORDERING SYSTEM PROVIDED HISTORY: Assess for pulm edema vs PNA TECHNOLOGIST PROVIDED HISTORY: Assess for pulm edema vs PNA Ordering Physician Provided Reason for Exam: cough, congestion Acuity: Acute Type of Exam: Initial FINDINGS: Right IJ central venous catheter is in place tip in the SVC right atrial junction. Pacer wires are in place. The heart and mediastinal structures are stable. Pleural effusions are present with bibasilar atelectasis. Bilateral lung infiltrates are present in the upper lung fields. Persistent pleural effusions with bibasilar atelectasis and bilateral lung infiltrates with areas of consolidation developing in the upper lobes. Xr Chest Portable    Result Date: 4/26/2019  EXAMINATION: SINGLE XRAY VIEW OF THE CHEST 4/26/2019 6:51 am COMPARISON: April 24, 2019 HISTORY: ORDERING SYSTEM PROVIDED HISTORY: Pleural effusions TECHNOLOGIST PROVIDED HISTORY: Pleural effusions Ordering Physician Provided Reason for Exam: pleural effusion Acuity: Acute Type of Exam: Initial FINDINGS: Right IJ line in good position. Pacer device is stable. Cardiac leads overlie the chest.  Stable cardiomediastinal contours with calcified aortic arch. Left effusion and associated airspace disease, slightly decreased. Chronic blunting of right costophrenic sulcus may represent scarring or chronic effusion. Increased reticular markings right upper chest and left upper chest possibly interstitial edema or interstitial pneumonia. No new airspace consolidation. Increasing reticular markings upper chest bilaterally right greater than left possibly due to edema or interstitial pneumonitis. Improving left effusion with associated retrocardiac airspace disease. Pleural-parenchymal scarring or effusion in the right lung base, stable. Xr Chest Portable    Result Date: 4/24/2019  EXAMINATION: SINGLE XRAY VIEW OF THE CHEST 4/24/2019 6:05 am COMPARISON: Chest radiograph dated 04/22/2019 HISTORY: ORDERING SYSTEM PROVIDED HISTORY: Acute respiratory failure, pleural effusion TECHNOLOGIST PROVIDED HISTORY: Acute respiratory failure, pleural effusion Ordering Physician Provided Reason for Exam: pleural effusion, respiratory failure.  Acuity: Acute Type HISTORY: ORDERING SYSTEM PROVIDED HISTORY: hypoxia TECHNOLOGIST PROVIDED HISTORY: hypoxia Ordering Physician Provided Reason for Exam: hypoxia Acuity: Unknown Type of Exam: Unknown FINDINGS: Enteric tube in the stomach. ET tube is been removed. Right IJ catheter terminates in the atrial caval junction. Bipolar pacer on the left unchanged. Heart and mediastinum unremarkable. Moderate edema unchanged. Small effusions, left greater than right. Bony thorax intact. Status post extubation. Life support apparatus otherwise stable. Moderate edema and effusions unchanged. Xr Chest Portable    Result Date: 4/19/2019  EXAMINATION: SINGLE XRAY VIEW OF THE CHEST 4/19/2019 5:51 am COMPARISON: April 18, 2019 HISTORY: ORDERING SYSTEM PROVIDED HISTORY: ETT placement TECHNOLOGIST PROVIDED HISTORY: ETT placement Ordering Physician Provided Reason for Exam: Vent Pt check ETT placement Acuity: Acute Type of Exam: Subsequent/Follow-up Additional signs and symptoms: Vent Pt check ETT placement FINDINGS: Tubes and lines are unchanged. Persistent bibasilar lung opacities and pleural effusions. Mild pulmonary edema. No significant interval change. Xr Chest Portable    Result Date: 4/18/2019  EXAMINATION: SINGLE XRAY VIEW OF THE CHEST 4/18/2019 6:21 am COMPARISON: April 17, 2019 HISTORY: ORDERING SYSTEM PROVIDED HISTORY: ETT placement TECHNOLOGIST PROVIDED HISTORY: ETT placement Ordering Physician Provided Reason for Exam: on vent Acuity: Acute Type of Exam: Initial FINDINGS: Right IJ line and NG tube are stable. Interval advancement of ET tube terminating 3.1 cm from ron. Implanted cardiac device is present. Left-sided effusion and associated airspace disease is stable. Hazy right basilar opacity possibly edema or atelectasis. No pneumothorax. Increased opacity left upper chest possibly focal area of atelectasis, new from prior. Advanced ETT from prior exam, now 3.1 cm from ron.  Increased opacity left upper chest suspicious for atelectasis. Additional supporting devices are stable. Xr Chest Portable    Result Date: 4/17/2019  EXAMINATION: SINGLE XRAY VIEW OF THE CHEST 4/17/2019 7:15 am COMPARISON: 16 April 2019 HISTORY: ORDERING SYSTEM PROVIDED HISTORY: ETT placement TECHNOLOGIST PROVIDED HISTORY: ETT placement Ordering Physician Provided Reason for Exam: on vent Acuity: Acute Type of Exam: Initial FINDINGS: AP portable view of the chest time stamped at 613 hours demonstrates overlying cardiac monitoring electrodes. A right internal jugular catheter terminates in the superior vena cava. Endotracheal tube is somewhat high riding terminating 6.4 cm above the ron. Intestinal tube extends beyond the body of the stomach, tip not included on the exam.  Bipolar pacemaker enters from the left with intact leads in appropriate positions. Heart size is normal.  Left pleural effusion is noted there is continued volume loss in the left hemithorax with stable mild vascular congestion, perihilar opacities, and faint opacity in the right mid and lower lung field. Underlying COPD is noted. Osseous structures are stable. There is little change from prior study. Findings of COPD, mild central vascular congestion, left effusion, and scattered opacities favoring interstitial edema with multifocal pneumonitis not excluded. Tubes and lines as above. However, endotracheal tube is high riding. The findings were sent to the Radiology Results [address] at 7:58 am on 4/17/2019to be communicated to a licensed caregiver. RECOMMENDATION: Suggest advancement of endotracheal tube.      Xr Chest Portable    Result Date: 4/16/2019  EXAMINATION: SINGLE XRAY VIEW OF THE CHEST 4/16/2019 6:54 am COMPARISON: 04/15/2019, 610 hours HISTORY: ORDERING SYSTEM PROVIDED HISTORY: ETT placement TECHNOLOGIST PROVIDED HISTORY: ETT placement Ordering Physician Provided Reason for Exam: on vent Acuity: Acute Type of Exam: Initial 25-year-old female on ventilator; check endotracheal tube placement FINDINGS: Portable AP upright view of the chest. Endotracheal tube distal tip overlying the mid trachea approximately 4.1 cm above the level of the ron. Enteric tube traverses the GE junction with distal tip excluded from the field of view. Left subclavian approach cardiac pacemaker device distal lead tips relatively stable in position. Right internal jugular approach central venous catheter distal tip overlying the high right atrium, stable. Cardiac monitor leads overlie the chest. Atherosclerotic calcification of the thoracic aorta. Slight stable volume loss of the left hemithorax. No pneumothorax. No free air. Dense retrocardiac/left basilar airspace consolidation and small left-sided pleural effusion. Stable mild focal opacity at the right mid lung zone. Underlying COPD. Stable mild pulmonary vascular congestion and left-sided predominant parahilar opacity. Visualized osseous structures remain unchanged. 1. Stable multifocal airspace disease as detailed above with dense retrocardiac/left basilar airspace consolidation and small left-sided pleural effusion. Mild pulmonary vascular congestion. Findings may represent edema or multifocal pneumonia. 2. Underlying COPD. 3. Tubes and line as detailed above. Xr Chest Portable    Result Date: 4/15/2019  EXAMINATION: SINGLE XRAY VIEW OF THE CHEST 4/15/2019 6:47 am COMPARISON: 14 April 2019 HISTORY: ORDERING SYSTEM PROVIDED HISTORY: ETT placement TECHNOLOGIST PROVIDED HISTORY: ETT placement Ordering Physician Provided Reason for Exam: on vent Acuity: Acute Type of Exam: Initial FINDINGS: AP portable view of the chest time stamped at 612 hours demonstrates overlying cardiac monitoring electrodes. Endotracheal tube terminates 4 cm above the ron. Bipolar pacemaker enters from the left with intact leads in appropriate positions.   Intestinal tube extends beyond the fundus of the stomach, tip not included. Right internal jugular catheter terminates at the cavoatrial junction. Heart size is normal.  Aortic arch is calcified. There is interval improvement in vascular congestion with resolution of perihilar opacities. Some bibasilar opacities remain. No extrapleural air is noted. Osseous structures are stable. Interval improvement in vascular congestion and bilateral opacities consistent with resolving pulmonary edema. Tubes and lines as above. Xr Chest Portable    Result Date: 4/14/2019  EXAMINATION: SINGLE XRAY VIEW OF THE CHEST 4/14/2019 7:57 am COMPARISON: Portable chest 04/13/2019. HISTORY: ORDERING SYSTEM PROVIDED HISTORY: Intubation TECHNOLOGIST PROVIDED HISTORY: Intubation Ordering Physician Provided Reason for Exam: intubation Acuity: Acute Type of Exam: Initial Additional signs and symptoms: intubation FINDINGS: Endotracheal tube terminates over the midthoracic trachea. Dual-chamber pacemaker leads appear unchanged in position. Right IJ approach central venous catheter unchanged in position. Heart size not substantially changed. Perihilar and basilar opacities further increased. Left pleural effusion increased in size. Findings may reflect pulmonary edema, progressed from yesterday's exam.  Left pleural effusion increased in size. Xr Chest Portable    Result Date: 4/12/2019  EXAMINATION: SINGLE XRAY VIEW OF THE CHEST 4/12/2019 5:26 am COMPARISON: November 14, 2015. HISTORY: ORDERING SYSTEM PROVIDED HISTORY: line placement TECHNOLOGIST PROVIDED HISTORY: line placement Ordering Physician Provided Reason for Exam: New right side line placement. Acuity: Acute Type of Exam: Initial Additional signs and symptoms: New right side line placement. FINDINGS: Stable left pectoral trans venous cardiac pacer device. New right IJ central venous catheter with tip near the superior atrial caval junction. Normal lung volume. No new consolidation.   Curvilinear radiopacity projecting over the right upper lobe likely represents artifact from a skin fold. No pleural effusion or pneumothorax. Stable cardiomediastinal silhouette and great vessels with redemonstration of atherosclerotic thoracic aorta. New right IJ central venous catheter with tip near the superior atrial caval junction. No pneumothorax. No new consolidation. Vl Upper Extremity Bilateral Venous Duplex    Result Date: 4/22/2019    [facility]  Vascular Upper Extremities Veins Procedure   Patient Name   Piter Melvin     Date of Study           04/22/2019                 Aretha Cochran   Date of Birth  1950  Gender                  Female   Age            77 year(s)  Race                       Room Number    2002        Height:                 59.84 inch, 152 cm   Corporate ID # [id number]    Weight:                 102 pounds, 46.3 kg   Patient Acct # [de-identified]   BSA:        1.4 m^2     BMI:       20.03 kg/m^2   MR #           [id number]      Sonographer             Roderick Mario   Accession #    [id number]   Interpreting Physician  Ronnie Mckeon   Referring                  Referring Physician     Sharmin Benton *  Nurse  Practitioner  Procedure Type of Study:   Veins: Upper Extremities Veins, Venous Scan Upper Bilateral.  Indications for Study:Swelling. Patient Status: In Patient. Technical Quality:Limited visualization. Limitation reason:Line placements. Conclusions   Summary   No evidence of superficial or deep venous thrombosis in both upper  extremities.    Signature   ----------------------------------------------------------------  Electronically signed by Roderick Mario(Sonographer) on  04/22/2019 11:46 AM  ----------------------------------------------------------------   ----------------------------------------------------------------  Electronically signed by Dorena García Tee(Interpreting  physician) on 04/22/2019 09:31 PM  ---------------------------------------------------------------- Findings:   Right Impression:                  Left Impression:  Internal jugular vein not          The internal jugular, subclavian,  visualized due to line placement. axillary, brachial, radial, and ulnar                                     veins demonstrate normal  Subclavian, axillary, brachial,    compressibility with phasic Doppler  radial, and ulnar veins            signals. demonstrate normal compressibility  with phasic Doppler signals. Normal compressibility of the cephalic                                     vein. Normal compressibility of the  cephalic vein. Normal compressibility of the basilic                                     vein. Normal compressibility of the  basilic vein. Velocities are measured in cm/s ; Diameters are measured in cm Right UE Vein Measurements 2D Measurements +------------------------------------+----------+---------------+----------+ ! Location                            ! Visualized! Compressibility! Thrombosis! +------------------------------------+----------+---------------+----------+ ! Prox SCV                            ! Yes       ! Yes            ! None      ! +------------------------------------+----------+---------------+----------+ ! Dist SCV                            ! Yes       ! Yes            ! None      ! +------------------------------------+----------+---------------+----------+ ! Prox Axillary                       ! Yes       ! Yes            ! None      ! +------------------------------------+----------+---------------+----------+ ! Dist Axillary                       ! Yes       ! Yes            ! None      ! +------------------------------------+----------+---------------+----------+ ! Prox Brachial                       !Yes       ! Yes            ! None      ! +------------------------------------+----------+---------------+----------+ ! Dist Brachial                       !Yes       ! Yes            ! None      ! +------------------------------------+----------+---------------+----------+ ! Prox Radial                         !Yes       ! Yes            ! None      ! +------------------------------------+----------+---------------+----------+ ! Dist Radial                         !Yes       ! Yes            ! None      ! +------------------------------------+----------+---------------+----------+ ! Prox Ulnar                          ! Yes       ! Yes            ! None      ! +------------------------------------+----------+---------------+----------+ ! Dist Ulnar                          ! Yes       ! Yes            ! None      ! +------------------------------------+----------+---------------+----------+ ! Basilic at UA                       ! Yes       ! Yes            ! None      ! +------------------------------------+----------+---------------+----------+ ! Basilic at AF                       ! Yes       ! Yes            ! None      ! +------------------------------------+----------+---------------+----------+ ! Basilic at [address]                       ! Yes       ! Yes            ! None      ! +------------------------------------+----------+---------------+----------+ ! Cephalic at UA                      ! Yes       ! Yes            ! None      ! +------------------------------------+----------+---------------+----------+ ! Cephalic at AF                      ! Yes       ! Yes            ! None      ! +------------------------------------+----------+---------------+----------+ ! Cephalic at [address]                      ! Yes       ! Yes            ! None      ! +------------------------------------+----------+---------------+----------+ Doppler Measurements +-------------------------+-----------------------+------------------------+ ! Location                 ! Signal                 !Reflux                  ! +-------------------------+-----------------------+------------------------+ ! SCV                      ! Phasic                 ! No                      ! bowel activity is seen. Prominent activity within the common bile duct. The gallbladder is not visualized by the end of the exam.     Absent filling of the gallbladder consistent with acute cholecystitis. Physical Examination:        Physical Exam   Constitutional: She appears well-developed. No distress. HENT:   Mouth/Throat: Oropharynx is clear and moist.   Eyes:   Pale  Conjunctiva    Neck: Neck supple. Cardiovascular: Normal rate, regular rhythm and normal heart sounds. Pulmonary/Chest: Effort normal and breath sounds normal.   Diminished breath sounds lower lung fields    Abdominal: Soft. Bowel sounds are normal. There is tenderness (supra pubic ). Gross Bloody stools noted    Musculoskeletal: She exhibits no edema or tenderness. Neurological: She is alert. Skin: Skin is warm and dry. Nursing note and vitals reviewed.         Assessment:        Primary Problem  Sepsis due to urinary tract infection [facility])    Active Hospital Problems    Diagnosis Date Noted    GI bleed [K92.2] 05/03/2019    Rectal bleeding [K62.5]     Centrilobular emphysema ([address] [J43.2] 04/30/2019    CRP elevated [R79.82]     Elevated procalcitonin [R79.89]     Bandemia [D72.825]     Cholecystitis [K81.9]     Abdominal distention [R14.0]     Elevated CEA [R97.0]     Elevated CA 19-9 level [R97.8]     Urinary retention [R33.9]     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]     Septic shock ([address] [A41.9, R65.21]     Leukemoid reaction [D72.823]     Aspiration pneumonia of right lower lobe due to vomit ([address] [J69.0]     MRSA carrier [Z22.322]     Sepsis due to urinary tract infection ([address] [A41.9, N39.0] 04/11/2019    Cystitis [N30.90] 04/11/2019       Plan:        Sepsis secondary to e.coli, emphysematous cystitis - resolved   WBC wnl  CRP elevated   Urology consulted - appreciate recs               -LDGDG dced (4/30)  Gen surg consulted - appreciate recs  ID consulted - appreciate recs                Diflucan discontinued   PICC line placed  5/1  Cholecystotomy tube 4/22   Lasix continued - monitor urine output       GI Bleed  Bright red stool still present   NM GI scan negative for active bleeding   Lovenox and plavix held  Hb 6.7->8.4->7.6  Transfuse for hb <7.0  GI consulted - appreciate recs      Pneumonia +MRSA   CXR: continued bilateral effusions, bibasilar opactiies/bilateral interstitial opacites in upper lobes   Cont. abx per ID   Pulm consulted - appreciate recs              -wean to prednisone 10mg daily      Sinus Tachycardia, improved   Stable  Hx of CBA, bradycardia with pacemaker   Lopressor 12.5 BID   Cont. To monitor      Acute cholecystitis   General surgery consulted - appreciate recs   Surgical intervention: cholecystomy tube 4/22    Cholecystectomy in patient when medically stable d     Gastroparesis   Per GI: protonix  Reglan started on 4/17, daily EKG  Considering systemic autonomic dysfunction as overlying diagnosis (gastroparesis, neurogenic bladder, hypotension/fluctuating Bps)        Oral Candidiasis - improved     Anemia, most likely 2/2 bleeding   Transfused               1 unit 4/14              2 unit 4/16   1 unit 5/3     Thrombocytopenia - improved  Platelets 809  Cont. lovenox  Cont. To monitor      Hypokalemia   Potassium sliding scale   4.4     Hypophosphatemia   Sodium phosphate prn     Left knee remote distal tibia fx w/ osteopenia   Ortho to remove brace      Maintenance  Lovenox  Dulera, Xopenex, Symbicort, Atrovent  Continue home meds trazodone, ASA, Plavix, Norvasc, Effexor, Spiriva     Dispo  PT/OT eval and treat   Social work dc planning              Roseanna Richard MD  5/4/2019  9:52 AM     Attending Physician Statement  I have discussed the care of Winston Martinez and I have examined the patient myselft and taken ros and hpi , including pertinent history and exam findings,  with the resident. I have reviewed the key elements of all parts of the encounter with the resident. I agree with the assessment, plan and orders as documented by the resident.   Gi bleed  Lower lkey  Bowel prep  For colon Monday  Iron def anemia      Electronically signed by Cal Delong MD

## 2019-05-04 NOTE — PROGRESS NOTES
General Surgery Progress Note    Patient seen at the request of her nurse  There is concern regarding some oozing around the cholecystostomy tube  Patient is awake. Alert and oriented X 3. Non-toxic  Continues to refuse to eat  On TPN  Abdomen soft, mildly tender RUQ.  Some fullness noted  Minimal blood around cholecystostomy tube    CBC with Differential:    Lab Results   Component Value Date    WBC 10.3 05/04/2019    RBC 2.64 05/04/2019    RBC 3.66 05/23/2012    HGB 7.6 05/04/2019    HCT 23.2 05/04/2019     05/04/2019     05/23/2012    MCV 87.9 05/04/2019    MCH 28.8 05/04/2019    MCHC 32.8 05/04/2019    RDW 16.3 05/04/2019    METASPCT 2 05/04/2019    LYMPHOPCT 5 05/04/2019    MONOPCT 6 05/04/2019    BASOPCT 0 05/04/2019    MONOSABS 0.62 05/04/2019    LYMPHSABS 0.52 05/04/2019    EOSABS 0.10 05/04/2019    BASOSABS 0.00 05/04/2019    DIFFTYPE NOT REPORTED 05/04/2019     CMP:    Lab Results   Component Value Date     05/04/2019    K 4.4 05/04/2019    CL 98 05/04/2019    CO2 26 05/04/2019    BUN 23 05/04/2019    CREATININE <0.40 05/04/2019    GFRAA CANNOT BE CALCULATED 05/04/2019    LABGLOM CANNOT BE CALCULATED 05/04/2019    GLUCOSE 102 05/04/2019    GLUCOSE 87 05/21/2012    PROT 4.6 05/04/2019    LABALBU 1.6 05/04/2019    LABALBU 4.6 05/17/2012    CALCIUM 7.6 05/04/2019    BILITOT 0.46 05/04/2019    ALKPHOS 117 05/04/2019    AST 18 05/04/2019    ALT 14 05/04/2019       Acute cholecystitis  S/P cholecystostomy tube placement  Not stable for surgery per medicine  Bleeding scan noted  Large bloody BM per nurse  H/H continues to drop  May need CT abdomen/pelvis  Nurse asked to remove tube dressing, clean and observe

## 2019-05-04 NOTE — PROGRESS NOTES
Pulmonary Progress Note  [facility]      Patient - Sue Kehr,  Age - 77 y.o.    - 1950      Room Number - 2123/2123-01   N -  [id number]   [facility] # - [de-identified]  Date of Admission -  2019  2:48 PM        Consulting Duwaine Peabody, MD  Primary Care Physician - Nikky Reason, DO     SUBJECTIVE   Comfortable in bed, some complaints of hunger and thirst  On oxygen  A cough during exam    OBJECTIVE   VITALS    height is 5' (1.524 m) and weight is 111 lb 8.8 oz (50.6 kg). Her oral temperature is 97.3 °F (36.3 °C). Her blood pressure is 137/63 and her pulse is 92. Her respiration is 16 and oxygen saturation is 92%. Body mass index is 21.79 kg/m². Temperature Range: Temp: 97.3 °F (36.3 °C) Temp  Av.4 °F (36.9 °C)  Min: 97.3 °F (36.3 °C)  Max: 99.2 °F (37.3 °C)  BP Range:  Systolic (31CZO), MNL:578 , Min:90 , KYZ:935     Diastolic (91CNC), WUF:12, Min:40, Max:63    Pulse Range: Pulse  Av.7  Min: 89  Max: 100  Respiration Range: Resp  Av.7  Min: 16  Max: 20  Current Pulse Ox[de-identified]  SpO2: 92 %  24HR Pulse Ox Range:  SpO2  Av.5 %  Min: 92 %  Max: 96 %  Oxygen Amount and Delivery: O2 Flow Rate (L/min): 2 L/min    Wt Readings from Last 3 Encounters:   19 111 lb 8.8 oz (50.6 kg)   19 89 lb (40.4 kg)   19 94 lb 1.6 oz (42.7 kg)       I/O (24 Hours)    Intake/Output Summary (Last 24 hours) at 2019 1323  Last data filed at 2019 0545  Gross per 24 hour   Intake 1110 ml   Output 560 ml   Net 550 ml       EXAM     General Appearance  Awake, alert, oriented, in no acute distress  HEENT - normocephalic, atraumatic.  []  Mallampati  [] Crowded airway   [] Macroglossia  []  Retrognathia  [] Micrognathia  []  Normal tongue size []  Normal Bite  [] [place] sign positive    Neck - Supple,  trachea midline   Lungs - clear bilaterally, no rales no rhonchi  Cardiovascular - Heart sounds are normal.  Regular rate and rhythm   Abdomen - Soft, nontender, nondistended, no masses or organomegaly  Neurologic - There are no focal motor or sensory deficits  Skin - No bruising or bleeding  Extremities - No clubbing, cyanosis, edema    MEDS      sodium chloride  250 mL Intravenous Once    sodium chloride  250 mL Intravenous Once    predniSONE  10 mg Oral Daily    mometasone-formoterol  2 puff Inhalation BID    pantoprazole  40 mg Oral QAM AC    metoprolol tartrate  12.5 mg Oral BID    fat emulsion  100 mL Intravenous Daily    furosemide  40 mg Intravenous Daily    magnesium sulfate  1 g Intravenous Once    ipratropium  0.5 mg Nebulization Q4H    insulin lispro  0-12 Units Subcutaneous Q6H    levalbuterol  1.25 mg Nebulization Q4H    venlafaxine  37.5 mg Oral TID    aspirin  81 mg Oral Daily    sodium chloride flush  10 mL Intravenous 2 times per day      PN-Adult 2-in-1 Central Line (Standard) 65 mL/hr at 05/03/19 1751    sodium chloride 10 mL/hr at 04/30/19 0029    dextrose       sodium chloride flush, hydrOXYzine, metoprolol, sodium chloride nebulizer, fentanNYL, oxyCODONE-acetaminophen, magnesium sulfate, sodium phosphate IVPB **OR** sodium phosphate IVPB, potassium chloride **OR** potassium alternative oral replacement **OR** potassium chloride, glucose, dextrose, glucagon (rDNA), dextrose, milk and molasses, sodium chloride flush, acetaminophen    LABS   CBC   Recent Labs     05/04/19  0420 05/04/19  1215   WBC 10.3  --    HGB 7.6* 8.1*   HCT 23.2* 24.8*   MCV 87.9  --      --      BMP:   Lab Results   Component Value Date     05/04/2019    K 4.4 05/04/2019    CL 98 05/04/2019    CO2 26 05/04/2019    BUN 23 05/04/2019    LABALBU 1.6 05/04/2019    LABALBU 4.6 05/17/2012    CREATININE <0.40 05/04/2019    CALCIUM 7.6 05/04/2019    GFRAA CANNOT BE CALCULATED 05/04/2019    LABGLOM CANNOT BE CALCULATED 05/04/2019     ABGs:  Lab Results   Component Value Date    PHART 7.519 04/19/2019    PO2ART 73.3 04/19/2019    SDD5ZUK 42.5 04/19/2019      Lab Results   Component Value Date    MODE PRVC 04/19/2019     Ionized Calcium:  No results found for: IONCA  Magnesium:    Lab Results   Component Value Date    MG 1.8 05/03/2019     Phosphorus:    Lab Results   Component Value Date    PHOS 3.3 05/03/2019        LIVER PROFILE   Recent Labs     05/04/19  0420   AST 18   ALT 14   BILITOT 0.46   ALKPHOS 117*     INR   Recent Labs     05/04/19  1215   INR 1.2     PTT   Lab Results   Component Value Date    APTT 27.7 03/28/2012         RADIOLOGY     (See actual reports for details)    ASSESSMENT/PLAN   Principal Problem:    Sepsis due to urinary tract infection ([address]  Active Problems:    Cystitis    Emphysematous cystitis    Septic shock ([address]    Leukemoid reaction    Aspiration pneumonia of right lower lobe due to vomit (HCC)    MRSA carrier    Urinary retention    Abdominal distention    Elevated CEA    Elevated CA 19-9 level    Cholecystitis    CRP elevated    Elevated procalcitonin    Bandemia    Centrilobular emphysema (HCC)    Rectal bleeding    GI bleed    Anemia  Resolved Problems:    * No resolved hospital problems.  *    Plan:  Continue slow steroid taper  Increase activity  Hopefully discharge soon    Electronically signed by Judy Bauer MD on 5/4/2019 at 1:23 PM

## 2019-05-04 NOTE — FLOWSHEET NOTE
05/04/19 1100   Encounter Summary   Services provided to: Patient   Referral/Consult From: Ольга   Continue Visiting   (5/4/19)   Volunteer Visit Yes   Complexity of Encounter Low   Length of Encounter 15 minutes   Spiritual/Sabianism   Type Spiritual support   Intervention Prayer

## 2019-05-04 NOTE — FLOWSHEET NOTE
05/04/19 1410   Encounter Summary   Services provided to: Patient not available  (Nurse attending to patient)   Referral/Consult From: Palliative Care   Routine   Type Initial

## 2019-05-04 NOTE — PROGRESS NOTES
Infectious disease Consult Note      Patient: Jonas Grady  : 1950  Acct#:  [id number]     Date:  2019    Assessment:   Anemia/GI bleed followed by GI  Cholecystitis status post cholecystectomy tube  grew Candida non-albicans and one colony of ESBL Klebsiella on culture . finished ABXS course      Ecoli Emphysematous cystitis treated     Aspiration pneumonia of right lower lobe treated    Sepsis /Septic shock     Yeast growth on sputum culture suspect contamination     Leukocytosis resolved    Acute hypoxic resp failure resolved    MRSA carrier    Urinary retention     Anemia      PCN allergy                 Recommendations:     Monitor off ABXS   Follow CBC , renal function closely . Subjective:       History of Present Illness  Patient is a 77 y.o.  female admitted with Sepsis due to urinary tract infection   who is seen in consult for the same . She presented w dysuria and lower abd pain for around a week associated with vomiting ,was found hypotensive with leukocytosis . CT suggested emphysematous cystitis,possible gastric outlet obstruction. CXR showed a right lower infiltrate. High CA-19-9,CEA  Allergy to [facility] w SOB   Urine culture  grew ESCHERICHIA COLI resistant to Ampicillin ,sensitive to all other tested ABXS . Blood cultures negative . Mycoplasma IGM 0.97   YEAST MODERATE GROWTH on sputum culture   S/P EGD   with reported esophagitis. GB US Findings suggesting acute cholecystitis. HIIDA showed absent gallbladder filling. She was extubated on   Status post cholecystectomy tube  by interventional radiology,  cultures on  grew Candida non-albicans and one colony of ESBL Klebsiella. PICC line was placed     Interval history :  Tagged RBC scan yesterday was negative . She still have bight blood per rectum, hemoglobin 8.4   She is complaining of abdominal pain. Blood oozing around cholecystotomy tube .   She is  still on TPN  No fever    WBC normal.    Past Medical History:   Diagnosis Date    Abnormal computed tomography of cervical spine     sclerotic bone appearance     CVA (cerebral vascular accident) ([address]     left  side weakness    GERD (gastroesophageal reflux disease)     Hypertension     Paraproteinemia     Weight loss       Past Surgical History:   Procedure Laterality Date    INTUBATION  4/14/2019         PACEMAKER PLACEMENT  07/2011    Pacemaker is Medtronic Revo (compatible). Leads placed in 1995 are NOT MRI compatible. Placed at [facility]. V's per Dr. Yossi Mast can not have an MRI.  UPPER GASTROINTESTINAL ENDOSCOPY N/A 4/16/2019    EGD ESOPHAGOGASTRODUODENOSCOPY @ BEDSIDE  ICU 2002 performed by Vijaya Fortune MD at [id number]          Admission Meds  No current facility-administered medications on file prior to encounter. Current Outpatient Medications on File Prior to Encounter   Medication Sig Dispense Refill    oxyCODONE-acetaminophen (PERCOCET) 5-325 MG per tablet Take 1 tablet by mouth every 6 hours as needed for Pain.  senna-docusate (PERICOLACE) 8.6-50 MG per tablet Take 1 tablet by mouth 2 times daily      budesonide-formoterol (SYMBICORT) 160-4.5 MCG/ACT AERO Inhale 2 puffs into the lungs 2 times daily      sucralfate (CARAFATE) 1 GM/10ML suspension Take 1 g by mouth 4 times daily       traZODone (DESYREL) 50 MG tablet Take 50 mg by mouth nightly      tiZANidine (ZANAFLEX) 2 MG tablet Take 2 mg by mouth every 8 hours as needed (left knee)       aspirin 81 MG tablet Take 81 mg by mouth daily      clopidogrel (PLAVIX) 75 MG tablet TAKE 1 TABLET DAILY 30 tablet 2    DOCQLACE 100 MG capsule TAKE 1 CAPSULE BY MOUTH IN THE MORNING & IN THE EVENING -DRINK PLENTYOF WATER WHILE TAKING THIS MEDICINE 30 capsule 1    amLODIPine (NORVASC) 10 MG tablet Take 10 mg by mouth daily.  LORazepam (ATIVAN) 0.5 MG tablet Take 0.5 mg by mouth every 6 hours as needed for Anxiety.        therapeutic multivitamin-minerals [facility]) tablet Take 1 tablet by mouth daily.  omeprazole (PRILOSEC) 20 MG capsule Take 20 mg by mouth daily.  venlafaxine (EFFEXOR) 37.5 MG tablet Take 37.5 mg by mouth 3 times daily.  Misc. Devices [facility]) [address] wheelchair with left fupper extremity support  Dx: stroke with left hemiparesis. 1 each 0           Allergies  Allergies   Allergen Reactions    Penicillins Shortness Of Breath and Rash    Amoxicillin         Social   Social History     Tobacco Use    Smoking status: Current Some Day Smoker     Packs/day: 1.00     Years: 30.00     Pack years: 30.00    Smokeless tobacco: Never Used   Substance Use Topics    Alcohol use: Not Currently     Alcohol/week: 16.8 oz     Types: 28 Glasses of wine per week                History reviewed. No pertinent family history. Review of Systems  Other than above 12 systems reviewed negative . Tolerating antibiotics. Physical Exam  /63   Pulse 92   Temp 97.3 °F (36.3 °C) (Oral)   Resp 16   Ht 5' (1.524 m)   Wt 111 lb 8.8 oz (50.6 kg)   SpO2 92%   BMI 21.79 kg/m²           General Appearance: alert ,NAD . Skin: warm and dry, no rash or erythema  Head: normocephalic and atraumatic  Eyes: pupils equal, round  Neck: neck supple and non tender   Pulmonary/Chest: coarse to auscultation bilaterally- no wheezes, rales or rhonchi  Cardiovascular: normal rate, regular rhythm, normal S1 and S2, no murmurs.   Abdomen: soft,mild upper abdomen tenderness  -distended,  no masses or organomegaly, gallbladder drain with  bile drainage   Extremities: no cyanosis, clubbing   Edema   PICC line in place       Data Review:    Recent Labs     05/02/19  0506 05/03/19  0425  05/03/19  2336 05/04/19  0420 05/04/19  1215   WBC 7.2 6.7  --   --  10.3  --    HGB 7.2* 6.4*   < > 7.8* 7.6* 8.1*   HCT 22.0* 19.5*   < > 23.9* 23.2* 24.8*   MCV 89.5 87.3  --   --  87.9  --    * 139*  --   --  172  --     < > = values in this interval not displayed. Recent Labs     05/02/19  0506 05/03/19  0425 05/04/19  0420   * 135 133*   K 4.2 3.4* 4.4    99 98   CO2 26 28 26   PHOS  --  3.3  --    BUN 18 20 23   CREATININE <0.40* <0.40* <0.40*     Recent Labs     05/02/19  0506 05/03/19  0425 05/04/19  0420   AST 20 16 18   ALT 13 13 14   BILITOT 0.34 0.34 0.46   ALKPHOS 97 103 117*     No results for input(s): LIPASE, AMYLASE in the last 72 hours. Recent Labs     05/02/19  0506 05/03/19  0425 05/04/19  1215   PROTIME 15.9* 15.3* 15.1*   INR 1.3 1.2 1.2       Imaging Studies:                           All appropriate imaging studies and reports reviewed: Yes       Ct Abdomen Pelvis Wo Contrast Additional Contrast? Oral    Result Date: 4/14/2019  EXAMINATION: CT OF THE ABDOMEN AND PELVIS WITHOUT CONTRAST 4/14/2019 7:34 pm TECHNIQUE: CT of the abdomen and pelvis was performed without the administration of intravenous contrast. Multiplanar reformatted images are provided for review. Dose modulation, iterative reconstruction, and/or weight based adjustment of the mA/kV was utilized to reduce the radiation dose to as low as reasonably achievable. COMPARISON: 04/11/2019 HISTORY: ORDERING SYSTEM PROVIDED HISTORY: ABDOMINAL PAIN TECHNOLOGIST PROVIDED HISTORY: Water soluble contrast only please Ordering Physician Provided Reason for Exam: Abdominal pain - Vented patient. Contrast given via nurse through NG tube. Acuity: Unknown Type of Exam: Unknown Relevant Medical/Surgical History: Hx - Sepsis due to urinary tract infection. FINDINGS: Lower Chest: New moderate layering bilateral pleural effusions with bilateral lower lobe atelectasis. Organs: Limited evaluation due lack of intravenous contrast.  Cholelithiasis redemonstrated. No gallbladder wall thickening or biliary ductal dilatation. Scattered tiny hypodense lesions in the liver are too small to characterize but statistically represent benign cysts or hemangiomas and appear unchanged.  The pancreas, effusion at the left lung base. There is no pneumothorax. There is no acute osseous abnormality. Interval increased opacity at the right lung base, may be related to atelectasis versus pneumonia. Small atelectasis and mild pleural effusion at the left lung, new since the prior study. Xr Femur Left (min 2 Views)    Result Date: 3/27/2019  EXAMINATION: 4 XRAY VIEWS OF THE LEFT FEMUR; 2 XRAY VIEWS OF THE LEFT KNEE; 3 XRAY VIEWS OF THE LEFT TIBIA AND FIBULA 3/27/2019 7:00 pm COMPARISON: Left hip 11/14/2015. HISTORY: ORDERING SYSTEM PROVIDED HISTORY: pain TECHNOLOGIST PROVIDED HISTORY: pain Ordering Physician Provided Reason for Exam: pt twisted wrong, lt knee pain, unable to straighten knee. Acuity: Acute Type of Exam: Initial FINDINGS: Left femur, four views: The bones are diffusely osteopenic. No acute fracture deformity. The hip joint is maintained. The femoral head projects within the acetabulum. No focal soft tissue abnormality is seen. Left knee, two views: The knee is flexed. No acute osseous abnormality. No joint effusion. Joint spaces are not optimally profiled but no significant arthritic changes are apparent. Left tib-fib, three views: There is evidence of a remote healed distal tibia fracture. No acute fracture or dislocation is seen. No focal soft tissue abnormality. No acute osseous abnormality of the left femur. No acute osseous abnormality of the left knee. No acute osseous abnormality of the left tib-fib. Healed remote distal tibia fracture. Marked osteopenia, likely due to disuse. Xr Knee Left (1-2 Views)    Result Date: 3/27/2019  EXAMINATION: 4 XRAY VIEWS OF THE LEFT FEMUR; 2 XRAY VIEWS OF THE LEFT KNEE; 3 XRAY VIEWS OF THE LEFT TIBIA AND FIBULA 3/27/2019 7:00 pm COMPARISON: Left hip 11/14/2015.  HISTORY: ORDERING SYSTEM PROVIDED HISTORY: pain TECHNOLOGIST PROVIDED HISTORY: pain Ordering Physician Provided Reason for Exam: pt twisted wrong, lt knee pain, unable to straighten knee. Acuity: Acute Type of Exam: Initial FINDINGS: Left femur, four views: The bones are diffusely osteopenic. No acute fracture deformity. The hip joint is maintained. The femoral head projects within the acetabulum. No focal soft tissue abnormality is seen. Left knee, two views: The knee is flexed. No acute osseous abnormality. No joint effusion. Joint spaces are not optimally profiled but no significant arthritic changes are apparent. Left tib-fib, three views: There is evidence of a remote healed distal tibia fracture. No acute fracture or dislocation is seen. No focal soft tissue abnormality. No acute osseous abnormality of the left femur. No acute osseous abnormality of the left knee. No acute osseous abnormality of the left tib-fib. Healed remote distal tibia fracture. Marked osteopenia, likely due to disuse. Xr Knee Left (3 Views)    Result Date: 3/30/2019  EXAMINATION: 3 XRAY VIEWS OF THE LEFT KNEE 3/30/2019 10:58 am COMPARISON: March 27, 2018 HISTORY: ORDERING SYSTEM PROVIDED HISTORY: F/u L knee pain after cast TECHNOLOGIST PROVIDED HISTORY: AP, oblique, lateral F/u L knee pain after cast FINDINGS: Marked osteopenia. Interval casting, which degrades fine osseous detail question cortical offset in the lateral femoral metaphysis. No malalignment identified. No significant joint effusion. Interval casting. Question nondisplaced fracture in the lateral femoral metaphysis. Osteopenia. Xr Tibia Fibula Left (2 Views)    Result Date: 3/27/2019  EXAMINATION: 4 XRAY VIEWS OF THE LEFT FEMUR; 2 XRAY VIEWS OF THE LEFT KNEE; 3 XRAY VIEWS OF THE LEFT TIBIA AND FIBULA 3/27/2019 7:00 pm COMPARISON: Left hip 11/14/2015. HISTORY: ORDERING SYSTEM PROVIDED HISTORY: pain TECHNOLOGIST PROVIDED HISTORY: pain Ordering Physician Provided Reason for Exam: pt twisted wrong, lt knee pain, unable to straighten knee. Acuity: Acute Type of Exam: Initial FINDINGS: Left femur, four views:  The bones are diffusely osteopenic. No acute fracture deformity. The hip joint is maintained. The femoral head projects within the acetabulum. No focal soft tissue abnormality is seen. Left knee, two views: The knee is flexed. No acute osseous abnormality. No joint effusion. Joint spaces are not optimally profiled but no significant arthritic changes are apparent. Left tib-fib, three views: There is evidence of a remote healed distal tibia fracture. No acute fracture or dislocation is seen. No focal soft tissue abnormality. No acute osseous abnormality of the left femur. No acute osseous abnormality of the left knee. No acute osseous abnormality of the left tib-fib. Healed remote distal tibia fracture. Marked osteopenia, likely due to disuse. Xr Abdomen (kub) (single Ap View)    Result Date: 4/14/2019  EXAMINATION: SINGLE SUPINE XRAY VIEW(S) OF THE ABDOMEN 4/14/2019 7:39 am COMPARISON: CT abdomen and pelvis  film from 11 April 2019 HISTORY: [address]: Abd Distention TECHNOLOGIST PROVIDED HISTORY: Abd Distention Ordering Physician Provided Reason for Exam: Abdominal distention. Pt was moving around in the bed. Best films at present time. Acuity: Acute Type of Exam: Initial Additional signs and symptoms: Abdominal distention. Pt was moving around in the bed. Best films at present time. FINDINGS: Portable view time stamped at 748 hours demonstrates an intestinal tube terminating in the midportion of a gaseous Spike dilated stomach. Densities are present over the stomach likely medication. Bipolar pacemaker is in situ with intact leads. Heart size is top-normal, stable. Gaseous distension of the stomach and loop of bowel in the upper mid abdomen is noted but there is gas and fecal material in the rectum. Gastric outlet obstruction or proximal small bowel partial obstruction is suspected. No free air is noted.   [place]line [place] is present over the pelvis likely a monitor or CT small bore catheter. Vascular calcification is present in the pelvis. Persistent preferential gaseous distension of the stomach although there is some gas in the upper abdomen and gas and fecal material present in the rectosigmoid. Findings suggest gastric outlet obstruction. Ct Abdomen Pelvis W Iv Contrast    Result Date: 4/11/2019  EXAMINATION: CT OF THE ABDOMEN AND PELVIS WITH CONTRAST 4/11/2019 5:14 pm TECHNIQUE: CT of the abdomen and pelvis was performed with the administration of intravenous contrast. Multiplanar reformatted images are provided for review. Dose modulation, iterative reconstruction, and/or weight based adjustment of the mA/kV was utilized to reduce the radiation dose to as low as reasonably achievable. COMPARISON: None. HISTORY: ORDERING SYSTEM PROVIDED HISTORY: Abdominal pain TECHNOLOGIST PROVIDED HISTORY: IV Only Contrast Ordering Physician Provided Reason for Exam: patient c/o abd pain for an hour FINDINGS: Lower Chest: Trace pleural fluid bilaterally is noted. Indwelling cardiac pacemaker is present. Heart size is normal.  Coronary artery calcifications are evident. The esophagus is significantly dilated and fluid-filled. No paraesophageal adenopathy is evident. Organs: The spleen, pancreas, and adrenals are unremarkable. The liver contains hypodensities, likely cysts. The gallbladder is distended and contains a solitary gallstone. The kidneys excrete contrast bilaterally. Extrarenal collecting systems are noted. The ureters are mildly dilated down to the urinary bladder without evidence of intraluminal or ureteral obstructing calculi. GI/Bowel: Marked distention of the stomach is noted; this continues to the pylorus with appearance suggesting partial gastric outlet obstruction. Fluid is present in some small bowel loops and colon distal to the stomach. No small bowel obstruction. Pelvis: Marked distention of the urinary bladder is noted.   There is air in the urinary bladder relatively stable in position. Right internal jugular approach central venous catheter distal tip overlying the high right atrium, stable. Cardiac monitor leads overlie the chest. Atherosclerotic calcification of the thoracic aorta. Slight stable volume loss of the left hemithorax. No pneumothorax. No free air. Dense retrocardiac/left basilar airspace consolidation and small left-sided pleural effusion. Stable mild focal opacity at the right mid lung zone. Underlying COPD. Stable mild pulmonary vascular congestion and left-sided predominant parahilar opacity. Visualized osseous structures remain unchanged. 1. Stable multifocal airspace disease as detailed above with dense retrocardiac/left basilar airspace consolidation and small left-sided pleural effusion. Mild pulmonary vascular congestion. Findings may represent edema or multifocal pneumonia. 2. Underlying COPD. 3. Tubes and line as detailed above. Xr Chest Portable    Result Date: 4/15/2019  EXAMINATION: SINGLE XRAY VIEW OF THE CHEST 4/15/2019 6:47 am COMPARISON: 14 April 2019 HISTORY: ORDERING SYSTEM PROVIDED HISTORY: ETT placement TECHNOLOGIST PROVIDED HISTORY: ETT placement Ordering Physician Provided Reason for Exam: on vent Acuity: Acute Type of Exam: Initial FINDINGS: AP portable view of the chest time stamped at 612 hours demonstrates overlying cardiac monitoring electrodes. Endotracheal tube terminates 4 cm above the ron. Bipolar pacemaker enters from the left with intact leads in appropriate positions. Intestinal tube extends beyond the fundus of the stomach, tip not included. Right internal jugular catheter terminates at the cavoatrial junction. Heart size is normal.  Aortic arch is calcified. There is interval improvement in vascular congestion with resolution of perihilar opacities. Some bibasilar opacities remain. No extrapleural air is noted. Osseous structures are stable.      Interval improvement in vascular congestion and bilateral opacities consistent with resolving pulmonary edema. Tubes and lines as above. Xr Chest Portable    Result Date: 4/14/2019  EXAMINATION: SINGLE XRAY VIEW OF THE CHEST 4/14/2019 7:57 am COMPARISON: Portable chest 04/13/2019. HISTORY: ORDERING SYSTEM PROVIDED HISTORY: Intubation TECHNOLOGIST PROVIDED HISTORY: Intubation Ordering Physician Provided Reason for Exam: intubation Acuity: Acute Type of Exam: Initial Additional signs and symptoms: intubation FINDINGS: Endotracheal tube terminates over the midthoracic trachea. Dual-chamber pacemaker leads appear unchanged in position. Right IJ approach central venous catheter unchanged in position. Heart size not substantially changed. Perihilar and basilar opacities further increased. Left pleural effusion increased in size. Findings may reflect pulmonary edema, progressed from yesterday's exam.  Left pleural effusion increased in size. Xr Chest Portable    Result Date: 4/12/2019  EXAMINATION: SINGLE XRAY VIEW OF THE CHEST 4/12/2019 5:26 am COMPARISON: November 14, 2015. HISTORY: ORDERING SYSTEM PROVIDED HISTORY: line placement TECHNOLOGIST PROVIDED HISTORY: line placement Ordering Physician Provided Reason for Exam: New right side line placement. Acuity: Acute Type of Exam: Initial Additional signs and symptoms: New right side line placement. FINDINGS: Stable left pectoral trans venous cardiac pacer device. New right IJ central venous catheter with tip near the superior atrial caval junction. Normal lung volume. No new consolidation. Curvilinear radiopacity projecting over the right upper lobe likely represents artifact from a skin fold. No pleural effusion or pneumothorax. Stable cardiomediastinal silhouette and great vessels with redemonstration of atherosclerotic thoracic aorta. New right IJ central venous catheter with tip near the superior atrial caval junction. No pneumothorax. No new consolidation.                    Thank you

## 2019-05-04 NOTE — PROGRESS NOTES
Nutrition Assessment (Parenteral Nutrition)    Type and Reason for Visit: Reassess    Nutrition Recommendations: Continue NPO status, TPN at 65 mL/hr and 100 mL of lipids. Following changes made to additives: remove MVI and trace elements. Nutrition Assessment: Patient is declining from a nutritional standpoint as evidence brick red stools and diet changing to NPO. Patient remains at risk for compromise due to reliance of TPN for nutrition and continues GI issues. Will continue TPN and lipids and monitor labs. Will monitor nutrition progression. Malnutrition Assessment:  · Malnutrition Status: At risk for malnutrition  · Context: Acute illness or injury  · Findings of the 6 clinical characteristics of malnutrition (Minimum of 2 out of 6 clinical characteristics is required to make the diagnosis of moderate or severe Protein Calorie Malnutrition based on AND/ASPEN Guidelines):  1. Energy Intake-Less than or equal to 75% of estimated energy requirement(Previously; improving with parenteral nutrition), Greater than or equal to 5 days    2. Weight Loss-Unable to assess(edema present),    3. Fat Loss-Unable to assess,    4. Muscle Loss-Unable to assess,    5. Fluid Accumulation-Mild fluid accumulation, Extremities  6.  Strength-Not measured    Nutrition Risk Level: High    Nutrient Needs:  · Estimated Daily Total Kcal: 4928-0510 based on wt of 25-27 per kg using wt of 57.2 kg  · Estimated Daily Protein (g): 63-68 based on 1.4-1.5 gm/kg IBW(revised)    Nutrition Diagnosis:   · Problem: Inadequate oral intake  · Etiology: related to Alteration in GI function, Insufficient energy/nutrient consumption     Signs and symptoms:  as evidenced by Nutrition support - PN, Intake 0-25%, GI abnormality    Objective Information:  · Nutrition-Focused Physical Findings: No appetite. Abdominal pain and occult blood.  Edema: +1 pitting RLE, +2 pitting LLE  · Wound Type: Multiple, Pressure Ulcer, Deep Tissue Injury(Wound

## 2019-05-04 NOTE — PROGRESS NOTES
[facility]    Gastroenterology Daily Progress Note      Patient:   Nimo Melvin   :    1950   Facility:   [facility]  Date:     2019  Consultant:   Wendy Limon, CNP      SUBJECTIVE  77 y.o. female admitted 2019 with Sepsis due to urinary tract infection ([facility] [address] [A41.9, N39.0]  Cystitis [N30.90]  Cystitis [N30.90] and seen for anemia, rectal bleeding abdominal pain. The pt was seen and examined. She continues to have bright red blood per rectum, two episodes so far this morning. She has intermittent epigastric pain but no nausea. Hgb is 8.4 from 6.4 yesterday. Her previous EGD on 19 showed esophagitis. Yesterdays bleeding scan was negative. She refuses to eat and is on TPN. Her cholecystectomy tube is patent.         OBJECTIVE  Scheduled Meds:   sodium chloride  250 mL Intravenous Once    sodium chloride  250 mL Intravenous Once    predniSONE  10 mg Oral Daily    mometasone-formoterol  2 puff Inhalation BID    pantoprazole  40 mg Oral QAM AC    metoprolol tartrate  12.5 mg Oral BID    fat emulsion  100 mL Intravenous Daily    furosemide  40 mg Intravenous Daily    magnesium sulfate  1 g Intravenous Once    ipratropium  0.5 mg Nebulization Q4H    insulin lispro  0-12 Units Subcutaneous Q6H    levalbuterol  1.25 mg Nebulization Q4H    venlafaxine  37.5 mg Oral TID    aspirin  81 mg Oral Daily    sodium chloride flush  10 mL Intravenous 2 times per day       Vital Signs:  /63   Pulse 92   Temp 97.3 °F (36.3 °C) (Oral)   Resp 16   Ht 5' (1.524 m)   Wt 111 lb 8.8 oz (50.6 kg)   SpO2 92%   BMI 21.79 kg/m²      Physical Exam:   General appearance: Alert & oriented, NAD ill appearing  Lungs: CTA bilaterally    Heart: S1S2 RRR  Abdomen: Soft, Nontender, Not distended, BS WNL cholecystectomy tube present with bile colored drainage, small amount of bleeding at insertion site  Skin: No jaundice, No clubbing, No cyanosis    Lab and Imaging Review CBC  Recent Labs     05/02/19  0506 05/03/19  0425  05/03/19  1808 05/03/19  2336 05/04/19  0420   WBC 7.2 6.7  --   --   --  10.3   HGB 7.2* 6.4*   < > 8.4* 7.8* 7.6*   HCT 22.0* 19.5*   < > 26.1* 23.9* 23.2*   MCV 89.5 87.3  --   --   --  87.9   * 139*  --   --   --  172    < > = values in this interval not displayed. BMP  Recent Labs     05/02/19  0506 05/03/19  0425 05/04/19  0420   * 135 133*   K 4.2 3.4* 4.4    99 98   CO2 26 28 26   BUN 18 20 23   CREATININE <0.40* <0.40* <0.40*   GLUCOSE 120* 115* 102*   CALCIUM 7.5* 7.2* 7.6*       LFTS  Recent Labs     05/02/19  0506 05/03/19  0425 05/04/19  0420   ALKPHOS 97 103 117*   ALT 13 13 14   AST 20 16 18   PROT 4.5* 4.3* 4.6*   BILITOT 0.34 0.34 0.46   LABALBU 1.5* 1.5* 1.6*       PT/INR  Recent Labs     05/02/19  0506 05/03/19 0425   PROTIME 15.9* 15.3*   INR 1.3 1.2     TECHNIQUE:4/22/19   Informed consent was obtained after a detailed explanation of the procedure   including risks, benefits, and alternatives.  Universal protocol was   followed. A suitable skin site was prepped and draped in sterile fashion   following ultrasound localization. An 18 gauge needle was advanced under   ultrasound guidance into the gallbladder via transhepatic route and a 0.035   guidewire was used to place a 8 [place] cholecystostomy tube under   fluoroscopic guidance.  The catheter was sutured to the skin and the patient   tolerated the procedure well.  Thick bilious material was sent for laboratory   analysis.  The catheter was attached to gravity drainage.       FINDINGS:   Ultrasound image demonstrates distended gallbladder.  Bilious fluid was sent   for culture.           Impression   Successful placement of transhepatic 8 Czech percutaneous cholecystostomy   tube.      Results for Michele Momin (MRN [id number]) as of 4/20/2019 09:15    Ref.  Range 4/15/2019 11:10    Latest Ref Range: <38 U/mL 62 (H)   CA 19-9 Latest Ref Range: 0 - 35 U/mL 49 (H)   CEA Latest Ref Range: <3.9 ng/mL 5.6 (H)    Results for Lisy Sheikh (MRN [id number]) as of 5/4/2019 12:26   Ref. Range 4/15/2019 11:10   AFP (Alpha Fetoprotein) Latest Ref Range: <8.4 ug/L 1.8     FINDINGS:hida 4/20/19   Prompt, homogenous uptake by the liver is noted with normal appearance of   radiotracer excretion into the biliary system.  Clearance of bloodpool   activity appears appropriate.       Normal small bowel activity is seen.  Prominent activity within the common   bile duct.  The gallbladder is not visualized by the end of the exam.           Impression   Absent filling of the gallbladder consistent with acute cholecystitis.         FINDINGS:ct abd 4/14/19   Lower Chest: New moderate layering bilateral pleural effusions with bilateral   lower lobe atelectasis.       Organs: Limited evaluation due lack of intravenous contrast.  Cholelithiasis   redemonstrated.  No gallbladder wall thickening or biliary ductal dilatation. Scattered tiny hypodense lesions in the liver are too small to characterize   but statistically represent benign cysts or hemangiomas and appear unchanged. The pancreas, spleen, adrenal glands, and kidneys are unremarkable. Cherene Barry is   no hydronephrosis or urinary tract calculus.       GI/Bowel: The stomach is distended.  Enteric tube is in place.  No contrast   is seen distal to the pylorus and there is contrast reflux into the distal   esophagus. Cherene Barry is no evidence of bowel obstruction.  The appendix is not   definitely visualized.  No focal pericecal inflammatory changes are evident.       Pelvis: The urinary bladder is decompressed by Tran catheter.  No pelvic   mass is seen.       Peritoneum/Retroperitoneum: Small amount of free fluid in the pelvic cavity. No free air or focal fluid collection.  No abnormal lymph node.  Normal   abdominal aortic caliber.  Moderate calcific atherosclerosis.       Bones/Soft Tissues: No acute osseous abnormality.  Diffuse anasarca. Moderate degenerative changes in the lumbar spine.           Impression   1. Distended, contrast filled stomach with reflux of contrast into the   esophagus.  No enteric contrast is seen distal to the pylorus suggesting   delayed gastric emptying in the setting of ileus. 2. New moderate layering bilateral pleural effusions with bilateral lower   lobe atelectasis. 3. Small amount of nonspecific free fluid in the pelvic cavity. 4. Anasarca. 5. Cholelithiasis.        FINDINGS:GB US 4/19/19   Pancreas is not well visualized.       No liver lesion.       Gallbladder wall thickening.  Gallbladder sludge.  Cholelithiasis.    Pericholecystic fluid.       Common bile duct measures at the upper limits of normal at 7 mm.           Impression   Findings suggesting acute cholecystitis.      ESOPHAGOGASTRODUODENOSCOPY   ( EGD )  DATE OF PROCEDURE: 4/16/2019      Lorna Milton MD        POSTOPERATIVE Sheela Kuhn has esophagitis.     Has a large amount of retained solid food in the upper part of the stomach and fundus.  Antrum is clear.  Pylorus wide open and did not show signs of stenosis.     OPERATION: Upper GI endoscopy          Findings:     Retropharyngeal area was grossly normal appearing     Esophagus: abnormal: Has a grade 2 esophagitis.  Appears peptic esophagitis.  Has a small sliding hiatal hernia.     Stomach:  Fundus of the stomach and body of the stomach.  With solid food.  75% of the lumen is occupied with solid food.     Antrum: No retained food.  Able to advance the scope through the pylorus without difficulty.  No pyloric stenosis seen.  No signs of outlet obstruction.     Duodenum:     Descending: normal    Bulb: normal  Minimal liquid present in the 2nd part of the duodenum.     FINDINGS:5/3/19 NM bleeding scan   Activity is seen within the blood pool, including the great vessels, heart,   liver, and spleen.  There was no abnormal accumulation of radioactivity in   the abdomen to suggest active bleeding during study acquisition.           Impression   No evidence of active GI bleeding during acquisition.           ASSESSMENT/PLAN:  1. Anemia with bright red blood per rectum; negative bleeding scan yesterday. will discuss endoscopy with Dr Elis Ramos; the pt did eat food this morning, will make npo for now  -trend the h/h and transfuse for hgb <7  -continue the ppi    2. cholecystitis s/p cholecystectomy tube placement 4/22/19  -managed per surgery    3.elevated tumor markers, mildly elevated alk phos, unable to have mri r/t pacemaker      This plan was formulated in collaboration with  . Electronically signed by: AGAPITO Argueta CNP on 5/4/2019 at 12:20 PM       Attending Physician Statement  I have discussed the care of Libby Issa and   I have examined the patient myselft and taken ros and hpi , including pertinent history and exam findings,  with the author of this note . I have reviewed the key elements of all parts of the encounter with the nurse practitioner/resident.     I agree with the assessment, plan and orders as documented by the above health care provider       Electronically signed by Anton Henao MD

## 2019-05-04 NOTE — PROGRESS NOTES
RN changed dressing on ashley tube. A moderate amount of liqiud brown drainage was present on the old dressing. A new clear occlusive dressing was placed in its place.

## 2019-05-04 NOTE — PLAN OF CARE
Problem: Risk for Impaired Skin Integrity  Goal: Tissue integrity - skin and mucous membranes  Description  Structural intactness and normal physiological function of skin and  mucous membranes. 5/4/2019 0322 by Allan Pizano RN  Outcome: Ongoing  Note:   Patient on waffle mattress. Turned and repositioned. Pure wick placed. 5/3/2019 1707 by Julieta John RN  Outcome: Ongoing     Problem: Falls - Risk of:  Goal: Will remain free from falls  Description  Will remain free from falls  5/4/2019 0322 by Allan Pizano RN  Outcome: Ongoing  Note:   Bed alarm on. Call light in reach. Patient weak nd making no attempt to get out of bed. 5/3/2019 1707 by Julieta John RN  Outcome: Ongoing  Goal: Absence of physical injury  Description  Absence of physical injury  5/4/2019 0322 by Allan Pizano RN  Outcome: Ongoing  5/3/2019 1707 by Julieta Jonh RN  Outcome: Ongoing     Problem: Infection, Septic Shock:  Goal: Will show no infection signs and symptoms  Description  Will show no infection signs and symptoms  5/4/2019 0322 by Allan Pizano RN  Outcome: Ongoing  Note:   VS stable. Meds given as ordered. Telemetry NSR to ST.  5/3/2019 1707 by Julieta John RN  Outcome: Ongoing     Problem: Tissue Perfusion, Altered:  Goal: Circulatory function within specified parameters  Description  Circulatory function within specified parameters  5/4/2019 0322 by Allan Pizano RN  Outcome: Ongoing  5/3/2019 1707 by Julieta John RN  Outcome: Ongoing     Problem: Pain:  Goal: Pain level will decrease  Description  Pain level will decrease  5/4/2019 0322 by Allan Pizano RN  Outcome: Ongoing  Note:   Patient positioned comfortably. No complaints of pin.   5/3/2019 1707 by Julieta John RN  Outcome: Ongoing  Goal: Control of acute pain  Description  Control of acute pain  5/4/2019 0322 by Allan Pizano RN  Outcome: Ongoing  5/3/2019 1707 by Josephine Craig Hemanth Rueda RN  Outcome: Ongoing  Goal: Control of chronic pain  Description  Control of chronic pain  5/4/2019 0322 by Princess Cruz RN  Outcome: Ongoing  5/3/2019 1707 by Roxana Melissa RN  Outcome: Ongoing     Problem: Nutrition  Goal: Optimal nutrition therapy  Description       5/4/2019 0322 by Princess Cruz RN  Outcome: Ongoing  5/3/2019 1707 by Roxana Melissa RN  Outcome: Ongoing  5/3/2019 1454 by Miriam Christensen RD, LD  Outcome: Ongoing

## 2019-05-04 NOTE — CARE COORDINATION
DISCHARGE PLANNING NOTE:    Plan is for this patient to go to [facility]. Awaiting appeal through insurance company - can take some time to obtain approval.     To have colonoscopy on Monday. Will continue to follow along.      Electronically signed by Dea Alexandre RN on 5/4/2019 at 2:36 PM

## 2019-05-04 NOTE — PROGRESS NOTES
DATE: 2019    NAME: Olena Carrel  MRN: [id number]   : 1950    Patient not seen this date for Physical Therapy due to:  [] Blood transfusion in progress  [] Hemodialysis  [x]  Patient Declined  [] Spine Precautions   [] Strict Bedrest  [] Surgery/ Procedure  [] Testing      [x] Other-RN notified        [] PT being discontinued at this time. Patient independent. No further needs. [] PT being discontinued at this time as the patient has been transferred to palliative care. No further needs.     Maria E Rosenberg, PT

## 2019-05-05 ENCOUNTER — APPOINTMENT (OUTPATIENT)
Dept: CT IMAGING | Age: 69
DRG: 853 | End: 2019-05-05
Payer: COMMERCIAL

## 2019-05-05 ENCOUNTER — APPOINTMENT (OUTPATIENT)
Dept: GENERAL RADIOLOGY | Age: 69
DRG: 853 | End: 2019-05-05
Payer: COMMERCIAL

## 2019-05-05 LAB
ABSOLUTE BANDS #: 0.91 K/UL (ref 0–1)
ABSOLUTE EOS #: 0.09 K/UL (ref 0–0.4)
ABSOLUTE IMMATURE GRANULOCYTE: ABNORMAL K/UL (ref 0–0.3)
ABSOLUTE LYMPH #: 0.73 K/UL (ref 1–4.8)
ABSOLUTE MONO #: 0.27 K/UL (ref 0.1–1.3)
ALBUMIN SERPL-MCNC: 1.6 G/DL (ref 3.5–5.2)
ALBUMIN/GLOBULIN RATIO: ABNORMAL (ref 1–2.5)
ALP BLD-CCNC: 148 U/L (ref 35–104)
ALT SERPL-CCNC: 15 U/L (ref 5–33)
ANION GAP SERPL CALCULATED.3IONS-SCNC: 8 MMOL/L (ref 9–17)
AST SERPL-CCNC: 18 U/L
BANDS: 10 % (ref 0–10)
BASOPHILS # BLD: 0 % (ref 0–2)
BASOPHILS ABSOLUTE: 0 K/UL (ref 0–0.2)
BILIRUB SERPL-MCNC: 0.35 MG/DL (ref 0.3–1.2)
BUN BLDV-MCNC: 21 MG/DL (ref 8–23)
BUN/CREAT BLD: ABNORMAL (ref 9–20)
C-REACTIVE PROTEIN: 104.5 MG/L (ref 0–5)
CALCIUM SERPL-MCNC: 7.4 MG/DL (ref 8.6–10.4)
CHLORIDE BLD-SCNC: 97 MMOL/L (ref 98–107)
CO2: 26 MMOL/L (ref 20–31)
CREAT SERPL-MCNC: <0.4 MG/DL (ref 0.5–0.9)
DIFFERENTIAL TYPE: ABNORMAL
EOSINOPHILS RELATIVE PERCENT: 1 % (ref 0–4)
GFR AFRICAN AMERICAN: ABNORMAL ML/MIN
GFR NON-AFRICAN AMERICAN: ABNORMAL ML/MIN
GFR SERPL CREATININE-BSD FRML MDRD: ABNORMAL ML/MIN/{1.73_M2}
GFR SERPL CREATININE-BSD FRML MDRD: ABNORMAL ML/MIN/{1.73_M2}
GLUCOSE BLD-MCNC: 102 MG/DL (ref 65–105)
GLUCOSE BLD-MCNC: 106 MG/DL (ref 70–99)
GLUCOSE BLD-MCNC: 110 MG/DL (ref 65–105)
GLUCOSE BLD-MCNC: 119 MG/DL (ref 65–105)
GLUCOSE BLD-MCNC: 121 MG/DL (ref 65–105)
HCT VFR BLD CALC: 21.7 % (ref 36–46)
HCT VFR BLD CALC: 21.9 % (ref 36–46)
HCT VFR BLD CALC: 22.8 % (ref 36–46)
HCT VFR BLD CALC: 26.4 % (ref 36–46)
HEMOGLOBIN: 7.1 G/DL (ref 12–16)
HEMOGLOBIN: 7.2 G/DL (ref 12–16)
HEMOGLOBIN: 7.5 G/DL (ref 12–16)
HEMOGLOBIN: 8.7 G/DL (ref 12–16)
IMMATURE GRANULOCYTES: ABNORMAL %
INR BLD: 1.2
LYMPHOCYTES # BLD: 8 % (ref 24–44)
MCH RBC QN AUTO: 29 PG (ref 26–34)
MCHC RBC AUTO-ENTMCNC: 33.1 G/DL (ref 31–37)
MCV RBC AUTO: 87.6 FL (ref 80–100)
METAMYELOCYTES ABSOLUTE COUNT: 0.27 K/UL
METAMYELOCYTES: 3 %
MONOCYTES # BLD: 3 % (ref 1–7)
MORPHOLOGY: ABNORMAL
MYELOCYTES ABSOLUTE COUNT: 0.27 K/UL
MYELOCYTES: 3 %
NRBC AUTOMATED: ABNORMAL PER 100 WBC
PDW BLD-RTO: 16.6 % (ref 11.5–14.9)
PLATELET # BLD: 196 K/UL (ref 150–450)
PLATELET ESTIMATE: ABNORMAL
PMV BLD AUTO: 9 FL (ref 6–12)
POTASSIUM SERPL-SCNC: 4.5 MMOL/L (ref 3.7–5.3)
PROCALCITONIN: 0.09 NG/ML
PROTHROMBIN TIME: 15.3 SEC (ref 11.8–14.6)
RBC # BLD: 2.48 M/UL (ref 4–5.2)
RBC # BLD: ABNORMAL 10*6/UL
SEDIMENTATION RATE, ERYTHROCYTE: 83 MM (ref 0–20)
SEG NEUTROPHILS: 72 % (ref 36–66)
SEGMENTED NEUTROPHILS ABSOLUTE COUNT: 6.56 K/UL (ref 1.3–9.1)
SODIUM BLD-SCNC: 131 MMOL/L (ref 135–144)
TOTAL PROTEIN: 4.6 G/DL (ref 6.4–8.3)
WBC # BLD: 9.1 K/UL (ref 3.5–11)
WBC # BLD: ABNORMAL 10*3/UL

## 2019-05-05 PROCEDURE — 6360000002 HC RX W HCPCS: Performed by: STUDENT IN AN ORGANIZED HEALTH CARE EDUCATION/TRAINING PROGRAM

## 2019-05-05 PROCEDURE — 74160 CT ABDOMEN W/CONTRAST: CPT

## 2019-05-05 PROCEDURE — 82947 ASSAY GLUCOSE BLOOD QUANT: CPT

## 2019-05-05 PROCEDURE — 6370000000 HC RX 637 (ALT 250 FOR IP): Performed by: INTERNAL MEDICINE

## 2019-05-05 PROCEDURE — 99232 SBSQ HOSP IP/OBS MODERATE 35: CPT | Performed by: INTERNAL MEDICINE

## 2019-05-05 PROCEDURE — 36415 COLL VENOUS BLD VENIPUNCTURE: CPT

## 2019-05-05 PROCEDURE — 2500000003 HC RX 250 WO HCPCS: Performed by: STUDENT IN AN ORGANIZED HEALTH CARE EDUCATION/TRAINING PROGRAM

## 2019-05-05 PROCEDURE — 6360000002 HC RX W HCPCS: Performed by: INTERNAL MEDICINE

## 2019-05-05 PROCEDURE — 2580000003 HC RX 258: Performed by: INTERNAL MEDICINE

## 2019-05-05 PROCEDURE — 86140 C-REACTIVE PROTEIN: CPT

## 2019-05-05 PROCEDURE — 85018 HEMOGLOBIN: CPT

## 2019-05-05 PROCEDURE — 85610 PROTHROMBIN TIME: CPT

## 2019-05-05 PROCEDURE — 94761 N-INVAS EAR/PLS OXIMETRY MLT: CPT

## 2019-05-05 PROCEDURE — APPNB30 APP NON BILLABLE TIME 0-30 MINS: Performed by: NURSE PRACTITIONER

## 2019-05-05 PROCEDURE — 85014 HEMATOCRIT: CPT

## 2019-05-05 PROCEDURE — 80053 COMPREHEN METABOLIC PANEL: CPT

## 2019-05-05 PROCEDURE — 2060000000 HC ICU INTERMEDIATE R&B

## 2019-05-05 PROCEDURE — 85651 RBC SED RATE NONAUTOMATED: CPT

## 2019-05-05 PROCEDURE — 2500000003 HC RX 250 WO HCPCS: Performed by: SURGERY

## 2019-05-05 PROCEDURE — 85025 COMPLETE CBC W/AUTO DIFF WBC: CPT

## 2019-05-05 PROCEDURE — 84145 PROCALCITONIN (PCT): CPT

## 2019-05-05 PROCEDURE — 6360000004 HC RX CONTRAST MEDICATION: Performed by: INTERNAL MEDICINE

## 2019-05-05 PROCEDURE — 94640 AIRWAY INHALATION TREATMENT: CPT

## 2019-05-05 PROCEDURE — 6370000000 HC RX 637 (ALT 250 FOR IP): Performed by: STUDENT IN AN ORGANIZED HEALTH CARE EDUCATION/TRAINING PROGRAM

## 2019-05-05 RX ORDER — SODIUM CHLORIDE 0.9 % (FLUSH) 0.9 %
10 SYRINGE (ML) INJECTION PRN
Status: DISCONTINUED | OUTPATIENT
Start: 2019-05-05 | End: 2019-05-15

## 2019-05-05 RX ORDER — 0.9 % SODIUM CHLORIDE 0.9 %
80 INTRAVENOUS SOLUTION INTRAVENOUS ONCE
Status: COMPLETED | OUTPATIENT
Start: 2019-05-05 | End: 2019-05-05

## 2019-05-05 RX ORDER — MAGNESIUM SULFATE 1 G/100ML
1 INJECTION INTRAVENOUS ONCE
Status: COMPLETED | OUTPATIENT
Start: 2019-05-05 | End: 2019-05-05

## 2019-05-05 RX ORDER — LORAZEPAM 2 MG/ML
0.5 INJECTION INTRAMUSCULAR EVERY 6 HOURS
Status: DISCONTINUED | OUTPATIENT
Start: 2019-05-05 | End: 2019-05-05

## 2019-05-05 RX ORDER — MORPHINE SULFATE 2 MG/ML
2 INJECTION, SOLUTION INTRAMUSCULAR; INTRAVENOUS EVERY 4 HOURS PRN
Status: DISCONTINUED | OUTPATIENT
Start: 2019-05-05 | End: 2019-05-15

## 2019-05-05 RX ORDER — LORAZEPAM 2 MG/ML
0.5 INJECTION INTRAMUSCULAR EVERY 6 HOURS PRN
Status: DISCONTINUED | OUTPATIENT
Start: 2019-05-05 | End: 2019-05-15

## 2019-05-05 RX ADMIN — Medication 2 PUFF: at 20:40

## 2019-05-05 RX ADMIN — PANTOPRAZOLE SODIUM 40 MG: 40 TABLET, DELAYED RELEASE ORAL at 06:31

## 2019-05-05 RX ADMIN — VENLAFAXINE 37.5 MG: 37.5 TABLET ORAL at 13:13

## 2019-05-05 RX ADMIN — ASPIRIN 81 MG: 81 TABLET, COATED ORAL at 08:57

## 2019-05-05 RX ADMIN — SODIUM CHLORIDE 80 ML: 9 INJECTION, SOLUTION INTRAVENOUS at 15:45

## 2019-05-05 RX ADMIN — OXYCODONE AND ACETAMINOPHEN 1 TABLET: 5; 325 TABLET ORAL at 13:12

## 2019-05-05 RX ADMIN — FENTANYL CITRATE 50 MCG: 50 INJECTION INTRAMUSCULAR; INTRAVENOUS at 19:02

## 2019-05-05 RX ADMIN — FENTANYL CITRATE 50 MCG: 50 INJECTION INTRAMUSCULAR; INTRAVENOUS at 14:38

## 2019-05-05 RX ADMIN — CALCIUM GLUCONATE: 94 INJECTION, SOLUTION INTRAVENOUS at 18:57

## 2019-05-05 RX ADMIN — Medication 10 ML: at 15:45

## 2019-05-05 RX ADMIN — Medication 2 PUFF: at 08:46

## 2019-05-05 RX ADMIN — ONDANSETRON 4 MG: 2 INJECTION INTRAMUSCULAR; INTRAVENOUS at 21:18

## 2019-05-05 RX ADMIN — PREDNISONE 10 MG: 10 TABLET ORAL at 08:46

## 2019-05-05 RX ADMIN — LEVALBUTEROL 1.25 MG: 1.25 SOLUTION, CONCENTRATE RESPIRATORY (INHALATION) at 11:24

## 2019-05-05 RX ADMIN — LEVALBUTEROL 1.25 MG: 1.25 SOLUTION, CONCENTRATE RESPIRATORY (INHALATION) at 07:10

## 2019-05-05 RX ADMIN — IPRATROPIUM BROMIDE 0.5 MG: 0.5 SOLUTION RESPIRATORY (INHALATION) at 07:10

## 2019-05-05 RX ADMIN — LORAZEPAM 0.5 MG: 2 INJECTION INTRAMUSCULAR; INTRAVENOUS at 23:01

## 2019-05-05 RX ADMIN — IPRATROPIUM BROMIDE 0.5 MG: 0.5 SOLUTION RESPIRATORY (INHALATION) at 11:24

## 2019-05-05 RX ADMIN — MAGNESIUM SULFATE HEPTAHYDRATE 1 G: 1 INJECTION, SOLUTION INTRAVENOUS at 08:46

## 2019-05-05 RX ADMIN — MORPHINE SULFATE 2 MG: 2 INJECTION, SOLUTION INTRAMUSCULAR; INTRAVENOUS at 21:12

## 2019-05-05 RX ADMIN — I.V. FAT EMULSION 100 ML: 20 EMULSION INTRAVENOUS at 18:57

## 2019-05-05 RX ADMIN — IOHEXOL 50 ML: 240 INJECTION, SOLUTION INTRATHECAL; INTRAVASCULAR; INTRAVENOUS; ORAL at 14:44

## 2019-05-05 RX ADMIN — Medication 10 ML: at 20:40

## 2019-05-05 RX ADMIN — METOPROLOL TARTRATE 12.5 MG: 25 TABLET ORAL at 08:46

## 2019-05-05 RX ADMIN — FUROSEMIDE 40 MG: 10 INJECTION, SOLUTION INTRAMUSCULAR; INTRAVENOUS at 08:46

## 2019-05-05 RX ADMIN — VENLAFAXINE 37.5 MG: 37.5 TABLET ORAL at 08:49

## 2019-05-05 RX ADMIN — OXYCODONE AND ACETAMINOPHEN 1 TABLET: 5; 325 TABLET ORAL at 00:54

## 2019-05-05 RX ADMIN — IOVERSOL 75 ML: 741 INJECTION INTRA-ARTERIAL; INTRAVENOUS at 15:45

## 2019-05-05 ASSESSMENT — PAIN DESCRIPTION - PAIN TYPE
TYPE: CHRONIC PAIN

## 2019-05-05 ASSESSMENT — PAIN DESCRIPTION - DESCRIPTORS
DESCRIPTORS: PATIENT UNABLE TO DESCRIBE

## 2019-05-05 ASSESSMENT — PAIN SCALES - GENERAL
PAINLEVEL_OUTOF10: 10
PAINLEVEL_OUTOF10: 10
PAINLEVEL_OUTOF10: 8
PAINLEVEL_OUTOF10: 10
PAINLEVEL_OUTOF10: 10
PAINLEVEL_OUTOF10: 9
PAINLEVEL_OUTOF10: 10

## 2019-05-05 ASSESSMENT — PAIN DESCRIPTION - PROGRESSION
CLINICAL_PROGRESSION: NOT CHANGED

## 2019-05-05 ASSESSMENT — PAIN SCALES - WONG BAKER
WONGBAKER_NUMERICALRESPONSE: 0
WONGBAKER_NUMERICALRESPONSE: 0

## 2019-05-05 ASSESSMENT — ENCOUNTER SYMPTOMS
SHORTNESS OF BREATH: 0
DIARRHEA: 0
NAUSEA: 0
VOMITING: 0
CONSTIPATION: 0

## 2019-05-05 ASSESSMENT — PAIN DESCRIPTION - FREQUENCY
FREQUENCY: CONTINUOUS
FREQUENCY: CONTINUOUS

## 2019-05-05 ASSESSMENT — PAIN DESCRIPTION - LOCATION
LOCATION: GENERALIZED

## 2019-05-05 ASSESSMENT — PAIN DESCRIPTION - ONSET
ONSET: ON-GOING

## 2019-05-05 ASSESSMENT — PAIN DESCRIPTION - ORIENTATION: ORIENTATION: LEFT

## 2019-05-05 NOTE — PROGRESS NOTES
Orders: Clear Liquid   · Oral Diet intake: 0%  · Oral Nutrition Supplement (ONS) Orders: None  · ONS intake: 0%  · Parenteral Nutrition Orders:  · Type and Formula: Premix Central, 2-in-1 Custom   · Lipids: 100ml, Daily  · Rate/Volume: 65 ml/ 1560 ml daily  · Duration: Continuous  · Current PN Order Provides: 1573 kcals, 78 gm of protein (lipids included)  · Goal PN Orders Provides: 0843-0126 kcal; 63-68 gm pro  · Additional Calories: None  · Anthropometric Measures:  · Ht: 5' (152.4 cm)   · Current Body Wt: 111 lb (50.3 kg)  · Admission Body Wt: 102 lb (46.3 kg)  · Ideal Body Wt: 100 lb (45.4 kg), % Ideal Body 108%  · BMI Classification: BMI 18.5 - 24.9 Normal Weight(Based on admission wt)    Nutrition Interventions:   Continue current diet, Continue Parenteral Nutrition  Continued Inpatient Monitoring, Education Not Indicated    Nutrition Evaluation:   · Evaluation: Progressing toward goals   · Goals: PN to meet greater 90% of estimated nutrition needs   · Monitoring: Nutrition Progression, Meal Intake, Weight, Pertinent Labs, Monitor Bowel Function, PN Intake, PN Tolerance, Diet Tolerance, Skin Integrity, I&O      Pascual DAVIDSON, R.D, L.D,  Clinical Dietitian  Cell # [phone]  Office # [phone]

## 2019-05-05 NOTE — PLAN OF CARE
Patient skin integrity maintained shift. No new skin issues to report. Medicated as ordered PRN for comfort. No falls or injuries this shift.  Patient safety maintained,

## 2019-05-05 NOTE — PROGRESS NOTES
Patient c/o increased pain at ashley tube site. RN noticed new light tan drainage and redness around site. Dr. Miracle Keenan was notified and assessed site. Dr. Shamir Menard was also notified. New orders for a CT abd to r/o abscess or fluid collection.

## 2019-05-05 NOTE — PROGRESS NOTES
Pulmonary Progress Note  [facility]      Patient - Lane Lawler,  Age - 77 y.o.    - 1950      Room Number - 2123/2123-01   N -  [id number]   [facility]  Date of Admission -  2019  2:48 PM        Consulting Kathy Philip MD  Primary Care Physician - Gurinder Livingston DO     SUBJECTIVE    no distress  Abdominal discomfort is the only complaint  OBJECTIVE   VITALS    height is 5' (1.524 m) and weight is 111 lb 8.8 oz (50.6 kg). Her oral temperature is 97.6 °F (36.4 °C). Her blood pressure is 120/59 (abnormal) and her pulse is 106. Her respiration is 18 and oxygen saturation is 94%. Body mass index is 21.79 kg/m². Temperature Range: Temp: 97.6 °F (36.4 °C) Temp  Av.9 °F (36.6 °C)  Min: 97.5 °F (36.4 °C)  Max: 98.7 °F (37.1 °C)  BP Range:  Systolic (20KGZ), NDQ:232 , Min:104 , XTX:962     Diastolic (14TGL), RCF:63, Min:49, Max:66    Pulse Range: Pulse  Av  Min: 94  Max: 106  Respiration Range: Resp  Av.5  Min: 16  Max: 18  Current Pulse Ox[de-identified]  SpO2: 94 %  24HR Pulse Ox Range:  SpO2  Av.5 %  Min: 94 %  Max: 99 %  Oxygen Amount and Delivery: O2 Flow Rate (L/min): 0 L/min    Wt Readings from Last 3 Encounters:   19 111 lb 8.8 oz (50.6 kg)   19 89 lb (40.4 kg)   19 94 lb 1.6 oz (42.7 kg)       I/O (24 Hours)    Intake/Output Summary (Last 24 hours) at 2019 1529  Last data filed at 2019 1115  Gross per 24 hour   Intake 1704 ml   Output 875 ml   Net 829 ml       EXAM     General Appearance  Awake, alert, oriented, in no acute distress  HEENT - normocephalic, atraumatic. Neck - Supple,  trachea midline   Lungs - coarse no crackles rales   Heart Exam:PMI normal. No lifts, heaves, or thrills. RRR.  No murmurs, clicks, gallops, or rubs  Abdomen Exam: Abdomen soft,  Extremity Exam:   No edema    MEDS      polyethylene glycol  2,000 mL Oral Once    sodium chloride  250 mL Intravenous Once  sodium chloride  250 mL Intravenous Once    predniSONE  10 mg Oral Daily    mometasone-formoterol  2 puff Inhalation BID    pantoprazole  40 mg Oral QAM AC    metoprolol tartrate  12.5 mg Oral BID    fat emulsion  100 mL Intravenous Daily    furosemide  40 mg Intravenous Daily    magnesium sulfate  1 g Intravenous Once    ipratropium  0.5 mg Nebulization Q4H    insulin lispro  0-12 Units Subcutaneous Q6H    levalbuterol  1.25 mg Nebulization Q4H    venlafaxine  37.5 mg Oral TID    aspirin  81 mg Oral Daily    sodium chloride flush  10 mL Intravenous 2 times per day      PN-Adult 2-in-1 Central Line (Standard)      PN-Adult 2-in-1 [employer] (Standard) 65 mL/hr at 05/04/19 1814    sodium chloride 10 mL/hr at 04/30/19 0029    dextrose       ondansetron, zolpidem, sodium chloride flush, hydrOXYzine, metoprolol, sodium chloride nebulizer, fentanNYL, oxyCODONE-acetaminophen, magnesium sulfate, sodium phosphate IVPB **OR** sodium phosphate IVPB, potassium chloride **OR** potassium alternative oral replacement **OR** potassium chloride, glucose, dextrose, glucagon (rDNA), dextrose, milk and molasses, sodium chloride flush, acetaminophen    LABS   CBC   Recent Labs     05/05/19  0619 05/05/19  1220   WBC 9.1  --    HGB 7.2* 8.7*   HCT 21.7* 26.4*   MCV 87.6  --      --      BMP:   Lab Results   Component Value Date     05/05/2019    K 4.5 05/05/2019    CL 97 05/05/2019    CO2 26 05/05/2019    BUN 21 05/05/2019    LABALBU 1.6 05/05/2019    LABALBU 4.6 05/17/2012    CREATININE <0.40 05/05/2019    CALCIUM 7.4 05/05/2019    GFRAA CANNOT BE CALCULATED 05/05/2019    LABGLOM CANNOT BE CALCULATED 05/05/2019     ABGs:  Lab Results   Component Value Date    PHART 7.519 04/19/2019    PO2ART 73.3 04/19/2019    HVB5XCW 42.5 04/19/2019      Lab Results   Component Value Date    MODE PRVC 04/19/2019     Ionized Calcium:  No results found for: IONCA  Magnesium:    Lab Results   Component Value Date    MG 1.7 05/04/2019     Phosphorus:    Lab Results   Component Value Date    PHOS 3.3 05/03/2019        LIVER PROFILE   Recent Labs     05/05/19  0619   AST 18   ALT 15   BILITOT 0.35   ALKPHOS 148*     INR   Recent Labs     05/05/19  0619   INR 1.2     PTT   Lab Results   Component Value Date    APTT 27.7 03/28/2012         RADIOLOGY     (See actual reports for details)    ASSESSMENT/PLAN     Patient Active Problem List   Diagnosis    Paraproteinemia    CVA [facility]cerebral vascular accident) ([facility] [address]    Abnormal computed tomography of cervical spine    Weight loss    Functional gait abnormality    Left knee pain    Knee pain, left    Acute pain of left knee    Sepsis due to urinary tract infection (HCC)    Cystitis    Emphysematous cystitis    Septic shock (HCC)    Leukemoid reaction    Aspiration pneumonia of right lower lobe due to vomit ([facility] [address]    MRSA carrier    Urinary retention    Abdominal distention    Elevated CEA    Elevated CA 19-9 level    Cholecystitis    CRP elevated    Elevated procalcitonin    Bandemia    Centrilobular emphysema (HCC)    Rectal bleeding    GI bleed    Anemia     History of CVA  history of COPD  Recent septic shock --- UTI  CXR  5/2 --- congestive changes    On iv lasix daily  abd ct ordered-- will recheck CXR   On Xopenex and Atrovent  Off antibiotics    Electronically signed by Yana Palacio MD on 5/5/2019 at 3:29 PM

## 2019-05-05 NOTE — PROGRESS NOTES
RN notified CT that the patient would likely be unable to drink contrast. CT tech said to have her drink as much as she can. Patient drank a few swallows and said she couldn't drink anymore.

## 2019-05-05 NOTE — PROGRESS NOTES
[address]    PROGRESS NOTE             5/5/2019    9:38 AM    Name:   Lisandra Barboza  MRN:     [id number]     Acct:      [de-identified]   Room:   [address] [place] [address] Day:  24  Admit Date:  4/11/2019  2:48 PM    PCP:  Bladimir Ward DO  Code Status:  Full Code    Subjective:     C/C:   Chief Complaint   Patient presents with    Abdominal Pain     Interval History Status: not changed. Patient seen bedside on progressive. Overnight patient had PVC's - Mg was 1.7, replaced. VSS. Patient complains of fatigue. Ortho to remove left leg cast.   F/u venous doppler for left leg pain. Plan for colonoscopy tomorrow per GI. Brief History:     The patient is a 68 y.o.  presents sepsis secondary to acute cystitis.     Review of Systems:     Review of Systems   Constitutional: Positive for fatigue. Negative for chills and fever. Respiratory: Negative for shortness of breath. Cardiovascular: Negative for chest pain, palpitations and leg swelling. Gastrointestinal: Negative for constipation, diarrhea, nausea and vomiting. Genitourinary: Negative for dysuria and frequency. Musculoskeletal: Negative for myalgias. Neurological: Positive for weakness. Negative for light-headedness and headaches. Medications: Allergies:     Allergies   Allergen Reactions    Penicillins Shortness Of Breath and Rash    Amoxicillin        Current Meds:   Scheduled Meds:    magnesium sulfate  1 g Intravenous Once    sodium chloride  250 mL Intravenous Once    sodium chloride  250 mL Intravenous Once    predniSONE  10 mg Oral Daily    mometasone-formoterol  2 puff Inhalation BID    pantoprazole  40 mg Oral QAM AC    metoprolol tartrate  12.5 mg Oral BID    fat emulsion  100 mL Intravenous Daily    furosemide  40 mg Intravenous Daily    magnesium sulfate  1 g Intravenous Once    ipratropium  0.5 mg Nebulization Q4H    insulin lispro  0-12 Units Subcutaneous Q6H    levalbuterol  1.25 mg Nebulization Q4H    venlafaxine  37.5 mg Oral TID    aspirin  81 mg Oral Daily    sodium chloride flush  10 mL Intravenous 2 times per day     Continuous Infusions:    PN-Adult 2-in-1 Central Line (Standard) 65 mL/hr at 19 1814    sodium chloride 10 mL/hr at 19 0029    dextrose       PRN Meds: ondansetron, zolpidem, sodium chloride flush, hydrOXYzine, metoprolol, sodium chloride nebulizer, fentanNYL, oxyCODONE-acetaminophen, magnesium sulfate, sodium phosphate IVPB **OR** sodium phosphate IVPB, potassium chloride **OR** potassium alternative oral replacement **OR** potassium chloride, glucose, dextrose, glucagon (rDNA), dextrose, milk and molasses, sodium chloride flush, acetaminophen    Data:     Past Medical History:   has a past medical history of Abnormal computed tomography of cervical spine, CVA (cerebral vascular accident) ([address] GERD (gastroesophageal reflux disease), Hypertension, Paraproteinemia, and Weight loss. Social History:   reports that she has been smoking. She has a 30.00 pack-year smoking history. She has never used smokeless tobacco. She reports that she drank about 16.8 oz of alcohol per week. She reports that she does not use drugs. Family History: History reviewed. No pertinent family history. Vitals:  /66   Pulse 95   Temp 98.7 °F (37.1 °C) (Oral)   Resp 18   Ht 5' (1.524 m)   Wt 111 lb 8.8 oz (50.6 kg)   SpO2 95%   BMI 21.79 kg/m²   Temp (24hrs), Av.9 °F (36.6 °C), Min:97.5 °F (36.4 °C), Max:98.7 °F (37.1 °C)    Recent Labs     19  1743 19  2338 19  0629 19  0812   POCGLU 133* 121* 102 110*       I/O(24Hr):     Intake/Output Summary (Last 24 hours) at 2019 0938  Last data filed at 2019 0555  Gross per 24 hour   Intake 1704 ml   Output 75 ml   Net 1629 ml       Labs:    Lab Results   Component Value Date    WBC 9.1 2019    HGB 7.2 (L) 2019 Vented patient. Contrast given via nurse through NG tube. Acuity: Unknown Type of Exam: Unknown Relevant Medical/Surgical History: Hx - Sepsis due to urinary tract infection. FINDINGS: Lower Chest: New moderate layering bilateral pleural effusions with bilateral lower lobe atelectasis. Organs: Limited evaluation due lack of intravenous contrast.  Cholelithiasis redemonstrated. No gallbladder wall thickening or biliary ductal dilatation. Scattered tiny hypodense lesions in the liver are too small to characterize but statistically represent benign cysts or hemangiomas and appear unchanged. The pancreas, spleen, adrenal glands, and kidneys are unremarkable. There is no hydronephrosis or urinary tract calculus. GI/Bowel: The stomach is distended. Enteric tube is in place. No contrast is seen distal to the pylorus and there is contrast reflux into the distal esophagus. There is no evidence of bowel obstruction. The appendix is not definitely visualized. No focal pericecal inflammatory changes are evident. Pelvis: The urinary bladder is decompressed by Tran catheter. No pelvic mass is seen. Peritoneum/Retroperitoneum: Small amount of free fluid in the pelvic cavity. No free air or focal fluid collection. No abnormal lymph node. Normal abdominal aortic caliber. Moderate calcific atherosclerosis. Bones/Soft Tissues: No acute osseous abnormality. Diffuse anasarca. Moderate degenerative changes in the lumbar spine. 1. Distended, contrast filled stomach with reflux of contrast into the esophagus. No enteric contrast is seen distal to the pylorus suggesting delayed gastric emptying in the setting of ileus. 2. New moderate layering bilateral pleural effusions with bilateral lower lobe atelectasis. 3. Small amount of nonspecific free fluid in the pelvic cavity. 4. Anasarca. 5. Cholelithiasis.      Xr Chest (single View Frontal)    Result Date: 5/2/2019  EXAMINATION: SINGLE XRAY VIEW OF THE CHEST 5/2/2019 6:34 am COMPARISON: 29 April 2019 HISTORY: ORDERING SYSTEM PROVIDED HISTORY: COPD TECHNOLOGIST PROVIDED HISTORY: COPD Ordering Physician Provided Reason for Exam: COPD Acuity: Acute Type of Exam: Initial FINDINGS: AP portable view of the chest time stamped at 630 hours demonstrates stable cardiac size. Overlying cardiac monitoring electrodes are present. Right-sided PICC line terminates in the right atrium. Bipolar pacemaker enters from the left with intact leads in appropriate positions. There is been no significant interval change in bilateral interstitial opacities, representing interval change since 2015. This may be related to worsening interstitial disease or superimposed venous congestion. Bilateral effusions are noted with bibasilar opacities, either atelectasis or edema. Continued bilateral effusions, bibasilar opacities and bilateral interstitial opacities in the upper lobes. Findings may be related to worsening interstitial disease and edema. Airspace disease is not excluded. Xr Chest (single View Frontal)    Result Date: 4/13/2019  EXAMINATION: SINGLE XRAY VIEW OF THE CHEST 4/13/2019 7:18 am COMPARISON: April 12, 2019 HISTORY: ORDERING SYSTEM PROVIDED HISTORY: dyspnea TECHNOLOGIST PROVIDED HISTORY: dyspnea Ordering Physician Provided Reason for Exam: dyspnea Acuity: Acute Type of Exam: Subsequent/Follow-up Additional signs and symptoms: dyspnea FINDINGS: A right IJ catheter is seen with its tip terminating at the superior cavoatrial junction. The left chest wall pacemaker and leads are stable. The cardiomediastinal silhouette is stable. There is interval increased opacity at the right lung base, may be related to atelectasis versus pneumonia. There is small atelectasis and mild pleural effusion at the left lung base. There is no pneumothorax. There is no acute osseous abnormality. Interval increased opacity at the right lung base, may be related to atelectasis versus pneumonia.  Small distentsion FINDINGS: Enteric tube with the tip within the stomach. Small left pleural effusion. Minimal right pleural effusion. Mildly dilated loops of bowel. Nonspecific bowel gas pattern with mildly dilated loops of bowel. Xr Abdomen (kub) (single Ap View)    Result Date: 4/14/2019  EXAMINATION: SINGLE SUPINE XRAY VIEW(S) OF THE ABDOMEN 4/14/2019 7:39 am COMPARISON: CT abdomen and pelvis  film from 11 April 2019 HISTORY: [address] [facility] [address]: Abd Distention TECHNOLOGIST PROVIDED HISTORY: Abd Distention Ordering Physician Provided Reason for Exam: Abdominal distention. Pt was moving around in the bed. Best films at present time. Acuity: Acute Type of Exam: Initial Additional signs and symptoms: Abdominal distention. Pt was moving around in the bed. Best films at present time. FINDINGS: Portable view time stamped at 748 hours demonstrates an intestinal tube terminating in the midportion of a gaseous Spike dilated stomach. Densities are present over the stomach likely medication. Bipolar pacemaker is in situ with intact leads. Heart size is top-normal, stable. Gaseous distension of the stomach and loop of bowel in the upper mid abdomen is noted but there is gas and fecal material in the rectum. Gastric outlet obstruction or proximal small bowel partial obstruction is suspected. No free air is noted. Midline city is present over the pelvis likely a monitor or CT small bore catheter. Vascular calcification is present in the pelvis. Persistent preferential gaseous distension of the stomach although there is some gas in the upper abdomen and gas and fecal material present in the rectosigmoid. Findings suggest gastric outlet obstruction.      Nm Gi Blood Loss    Result Date: 5/3/2019  EXAMINATION: NUCLEAR MEDICINE GASTRIC BLEEDING STUDY 5/3/2019 TECHNIQUE: Following the intravenous injection of 23.8 mCi of 99 mTc-labeled RBC's, a flow study and standard images of the abdomen was obtained over a total period of 60 minutes COMPARISON: None. HISTORY: ORDERING SYSTEM PROVIDED HISTORY: GI BLEEDING TECHNOLOGIST PROVIDED HISTORY: Ordering Physician Provided Reason for Exam: GI bleeding Acuity: Unknown Type of Exam: Unknown FINDINGS: Activity is seen within the blood pool, including the great vessels, heart, liver, and spleen. There was no abnormal accumulation of radioactivity in the abdomen to suggest active bleeding during study acquisition. No evidence of active GI bleeding during acquisition. RECOMMENDATIONS: If the patient shows hemodynamic signs of an active bleed in the next 20 hours, additional images can be acquired. Ct Abdomen Pelvis W Iv Contrast    Result Date: 4/11/2019  EXAMINATION: CT OF THE ABDOMEN AND PELVIS WITH CONTRAST 4/11/2019 5:14 pm TECHNIQUE: CT of the abdomen and pelvis was performed with the administration of intravenous contrast. Multiplanar reformatted images are provided for review. Dose modulation, iterative reconstruction, and/or weight based adjustment of the mA/kV was utilized to reduce the radiation dose to as low as reasonably achievable. COMPARISON: None. HISTORY: ORDERING SYSTEM PROVIDED HISTORY: Abdominal pain TECHNOLOGIST PROVIDED HISTORY: IV Only Contrast Ordering Physician Provided Reason for Exam: patient c/o abd pain for an hour FINDINGS: Lower Chest: Trace pleural fluid bilaterally is noted. Indwelling cardiac pacemaker is present. Heart size is normal.  Coronary artery calcifications are evident. The esophagus is significantly dilated and fluid-filled. No paraesophageal adenopathy is evident. Organs: The spleen, pancreas, and adrenals are unremarkable. The liver contains hypodensities, likely cysts. The gallbladder is distended and contains a solitary gallstone. The kidneys excrete contrast bilaterally. Extrarenal collecting systems are noted.   The ureters are mildly dilated down to the urinary bladder without evidence of intraluminal or ureteral obstructing calculi. GI/Bowel: Marked distention of the stomach is noted; this continues to the pylorus with appearance suggesting partial gastric outlet obstruction. Fluid is present in some small bowel loops and colon distal to the stomach. No small bowel obstruction. Pelvis: Marked distention of the urinary bladder is noted. 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 Distended ureters may be related to reflux or infection. No free pelvic fluid, pelvic or inguinal adenopathy is noted. Peritoneum/Retroperitoneum: No aortic aneurysm. Mild to moderate plaque is noted in the infrarenal abdominal aorta and both proximal iliac arteries. Shotty lymph nodes are present around the aorta in the upper abdomen. No mesenteric adenopathy is noted. Bones/Soft Tissues: Degenerative changes are present in the hips and lower lumbar facets. Estimated biologic radiation dose for this procedure:258.77 mGy/cm2.     1. Dilatation of the thoracic esophagus filled with fluid. No obstructive process is noted. No adjacent enlarged lymph nodes. 2. Trace pleural fluid. 3. Marked distention of the stomach. This appears to extend to the pylorus. Partial gastric outlet obstruction is not excluded. 4. Bilateral dilated ureters without obstructing calculi. Urinary bladder is markedly distended with bladder wall air suggesting emphysematous cystitis. Dilatation of the ureters may be related to reflux or infection. 5. Atherosclerotic disease. 6. Other findings as above. Critical results were called by Dr. Tracie Hwang MD to [address] on 4/11/2019 at 17:37. Ir Claire Inch Device Plmt/replace/removal    Result Date: 4/30/2019  PROCEDURE: ULTRASOUND GUIDED VASCULAR ACCESS. FLUOROSCOPY GUIDED PICC PLACEMENT 4/30/2019.  HISTORY: ORDERING SYSTEM PROVIDED HISTORY: TPN TECHNOLOGIST PROVIDED HISTORY: TPN Lumen?->Double Lumen SEDATION: None FLUOROSCOPY DOSE AND TYPE OR TIME AND EXPOSURES: 7 seconds; D  TECHNIQUE: This procedure was performed by Dr. Velia Goode. Informed consent was obtained after a detailed explanation of the procedure including risks, benefits, and alternatives. Universal protocol was observed. The right arm was prepped and draped in sterile fashion using maximum sterile barrier technique. Local anesthesia was achieved with lidocaine. A micropuncture needle was used to access the right basilic vein using ultrasound guidance. An ultrasound image demonstrating patency of the vein with needle tip located within it. An image was obtained and stored in PACs. A 0.018 guidewire was used to place a peel-a-way sheath and a 5 [place] dual-lumen PICC was advanced with fluoroscopic guidance with the tip at the cavo-atrial junction. The catheter flushed easily and there was a good blood return. The catheter was secured to the skin. The patient tolerated the procedure well and there were no immediate complications. FINDINGS: Fluoroscopic image demonstrates the tip of the catheter at the cavo-atrial junction. Successful ultrasound and fluoroscopy guided PICC placement     Us Gallbladder Ruq    Result Date: 4/19/2019  EXAMINATION: RIGHT UPPER QUADRANT ULTRASOUND 4/19/2019 10:48 am COMPARISON: CT abdomen and pelvis 4/14/2018 HISTORY: ORDERING SYSTEM PROVIDED HISTORY: ABDOMINAL PAIN FINDINGS: Pancreas is not well visualized. No liver lesion. Gallbladder wall thickening. Gallbladder sludge. Cholelithiasis. Pericholecystic fluid. Common bile duct measures at the upper limits of normal at 7 mm. Findings suggesting acute cholecystitis.      Xr Chest Portable    Result Date: 4/27/2019  EXAMINATION: SINGLE XRAY VIEW OF THE CHEST 4/27/2019 8:47 am COMPARISON: 04/26/2019 HISTORY: ORDERING SYSTEM PROVIDED HISTORY: Assess for pulm edema vs PNA TECHNOLOGIST PROVIDED HISTORY: Assess for pulm edema vs PNA Ordering Physician Provided Reason for Exam: cough, congestion Acuity: Acute Type of Exam: Initial FINDINGS: Right IJ central venous catheter is in place tip in the SVC right atrial junction. Pacer wires are in place. The heart and mediastinal structures are stable. Pleural effusions are present with bibasilar atelectasis. Bilateral lung infiltrates are present in the upper lung fields. Persistent pleural effusions with bibasilar atelectasis and bilateral lung infiltrates with areas of consolidation developing in the upper lobes. Xr Chest Portable    Result Date: 4/26/2019  EXAMINATION: SINGLE XRAY VIEW OF THE CHEST 4/26/2019 6:51 am COMPARISON: April 24, 2019 HISTORY: ORDERING SYSTEM PROVIDED HISTORY: Pleural effusions TECHNOLOGIST PROVIDED HISTORY: Pleural effusions Ordering Physician Provided Reason for Exam: pleural effusion Acuity: Acute Type of Exam: Initial FINDINGS: Right IJ line in good position. Pacer device is stable. Cardiac leads overlie the chest.  Stable cardiomediastinal contours with calcified aortic arch. Left effusion and associated airspace disease, slightly decreased. Chronic blunting of right costophrenic sulcus may represent scarring or chronic effusion. Increased reticular markings right upper chest and left upper chest possibly interstitial edema or interstitial pneumonia. No new airspace consolidation. Increasing reticular markings upper chest bilaterally right greater than left possibly due to edema or interstitial pneumonitis. Improving left effusion with associated retrocardiac airspace disease. Pleural-parenchymal scarring or effusion in the right lung base, stable.      Xr Chest Portable    Result Date: 4/24/2019  EXAMINATION: SINGLE XRAY VIEW OF THE CHEST 4/24/2019 6:05 am COMPARISON: Chest radiograph dated 04/22/2019 HISTORY: ORDERING SYSTEM PROVIDED HISTORY: Acute respiratory failure, pleural effusion TECHNOLOGIST PROVIDED HISTORY: Acute respiratory failure, pleural effusion Ordering Physician Provided Reason for Exam: pleural effusion, now 3.1 cm from ron. Increased opacity left upper chest suspicious for atelectasis. Additional supporting devices are stable. Xr Chest Portable    Result Date: 4/17/2019  EXAMINATION: SINGLE XRAY VIEW OF THE CHEST 4/17/2019 7:15 am COMPARISON: 16 April 2019 HISTORY: ORDERING SYSTEM PROVIDED HISTORY: ETT placement TECHNOLOGIST PROVIDED HISTORY: ETT placement Ordering Physician Provided Reason for Exam: on vent Acuity: Acute Type of Exam: Initial FINDINGS: AP portable view of the chest time stamped at 613 hours demonstrates overlying cardiac monitoring electrodes. A right internal jugular catheter terminates in the superior vena cava. Endotracheal tube is somewhat high riding terminating 6.4 cm above the ron. Intestinal tube extends beyond the body of the stomach, tip not included on the exam.  Bipolar pacemaker enters from the left with intact leads in appropriate positions. Heart size is normal.  Left pleural effusion is noted there is continued volume loss in the left hemithorax with stable mild vascular congestion, perihilar opacities, and faint opacity in the right mid and lower lung field. Underlying COPD is noted. Osseous structures are stable. There is little change from prior study. Findings of COPD, mild central vascular congestion, left effusion, and scattered opacities favoring interstitial edema with multifocal pneumonitis not excluded. Tubes and lines as above. However, endotracheal tube is high riding. The findings were sent to the Radiology Results [address] at 7:58 am on 4/17/2019to be communicated to a licensed caregiver. RECOMMENDATION: Suggest advancement of endotracheal tube.      Xr Chest Portable    Result Date: 4/16/2019  EXAMINATION: SINGLE XRAY VIEW OF THE CHEST 4/16/2019 6:54 am COMPARISON: 04/15/2019, 610 hours HISTORY: ORDERING SYSTEM PROVIDED HISTORY: ETT placement TECHNOLOGIST PROVIDED HISTORY: ETT placement Ordering Physician Provided Reason for Exam: on vent Acuity: Acute Type of Exam: Initial 57-year-old female on ventilator; check endotracheal tube placement FINDINGS: Portable AP upright view of the chest. Endotracheal tube distal tip overlying the mid trachea approximately 4.1 cm above the level of the ron. Enteric tube traverses the GE junction with distal tip excluded from the field of view. Left subclavian approach cardiac pacemaker device distal lead tips relatively stable in position. Right internal jugular approach central venous catheter distal tip overlying the high right atrium, stable. Cardiac monitor leads overlie the chest. Atherosclerotic calcification of the thoracic aorta. Slight stable volume loss of the left hemithorax. No pneumothorax. No free air. Dense retrocardiac/left basilar airspace consolidation and small left-sided pleural effusion. Stable mild focal opacity at the right mid lung zone. Underlying COPD. Stable mild pulmonary vascular congestion and left-sided predominant parahilar opacity. Visualized osseous structures remain unchanged. 1. Stable multifocal airspace disease as detailed above with dense retrocardiac/left basilar airspace consolidation and small left-sided pleural effusion. Mild pulmonary vascular congestion. Findings may represent edema or multifocal pneumonia. 2. Underlying COPD. 3. Tubes and line as detailed above. Xr Chest Portable    Result Date: 4/15/2019  EXAMINATION: SINGLE XRAY VIEW OF THE CHEST 4/15/2019 6:47 am COMPARISON: 14 April 2019 HISTORY: ORDERING SYSTEM PROVIDED HISTORY: ETT placement TECHNOLOGIST PROVIDED HISTORY: ETT placement Ordering Physician Provided Reason for Exam: on vent Acuity: Acute Type of Exam: Initial FINDINGS: AP portable view of the chest time stamped at 612 hours demonstrates overlying cardiac monitoring electrodes. Endotracheal tube terminates 4 cm above the ron. Bipolar pacemaker enters from the left with intact leads in appropriate positions.   Intestinal tube extends beyond the fundus of the stomach, tip not included. Right internal jugular catheter terminates at the cavoatrial junction. Heart size is normal.  Aortic arch is calcified. There is interval improvement in vascular congestion with resolution of perihilar opacities. Some bibasilar opacities remain. No extrapleural air is noted. Osseous structures are stable. Interval improvement in vascular congestion and bilateral opacities consistent with resolving pulmonary edema. Tubes and lines as above. Xr Chest Portable    Result Date: 4/14/2019  EXAMINATION: SINGLE XRAY VIEW OF THE CHEST 4/14/2019 7:57 am COMPARISON: Portable chest 04/13/2019. HISTORY: ORDERING SYSTEM PROVIDED HISTORY: Intubation TECHNOLOGIST PROVIDED HISTORY: Intubation Ordering Physician Provided Reason for Exam: intubation Acuity: Acute Type of Exam: Initial Additional signs and symptoms: intubation FINDINGS: Endotracheal tube terminates over the midthoracic trachea. Dual-chamber pacemaker leads appear unchanged in position. Right IJ approach central venous catheter unchanged in position. Heart size not substantially changed. Perihilar and basilar opacities further increased. Left pleural effusion increased in size. Findings may reflect pulmonary edema, progressed from yesterday's exam.  Left pleural effusion increased in size. Xr Chest Portable    Result Date: 4/12/2019  EXAMINATION: SINGLE XRAY VIEW OF THE CHEST 4/12/2019 5:26 am COMPARISON: November 14, 2015. HISTORY: ORDERING SYSTEM PROVIDED HISTORY: line placement TECHNOLOGIST PROVIDED HISTORY: line placement Ordering Physician Provided Reason for Exam: New right side line placement. Acuity: Acute Type of Exam: Initial Additional signs and symptoms: New right side line placement. FINDINGS: Stable left pectoral trans venous cardiac pacer device. New right IJ central venous catheter with tip near the superior atrial caval junction. Normal lung volume.   No new consolidation. Curvilinear radiopacity projecting over the right upper lobe likely represents artifact from a skin fold. No pleural effusion or pneumothorax. Stable cardiomediastinal silhouette and great vessels with redemonstration of atherosclerotic thoracic aorta. New right IJ central venous catheter with tip near the superior atrial caval junction. No pneumothorax. No new consolidation. Vl Upper Extremity Bilateral Venous Duplex    Result Date: 4/22/2019    [facility]  Vascular Upper Extremities Veins Procedure   Patient Name   Oumou Austin     Date of Study           04/22/2019                 Fernandez Leone   Date of Birth  1950  Gender                  Female   Age            77 year(s)  Race                       Room Number    2002        Height:                 59.84 inch, 152 cm   Corporate ID # [id number]    Weight:                 102 pounds, 46.3 kg   Patient Acct # [de-identified]   BSA:        1.4 m^2     BMI:       20.03 kg/m^2   MR #           [id number]      Sonographer             Roderick Mario   Accession #    [id number]   Interpreting Physician  Matt Thakkar   Referring                  Referring Physician     Cecily Garnica *  Nurse  Practitioner  Procedure Type of Study:   Veins: Upper Extremities Veins, Venous Scan Upper Bilateral.  Indications for Study:Swelling. Patient Status: In Patient. Technical Quality:Limited visualization. Limitation reason:Line placements. Conclusions   Summary   No evidence of superficial or deep venous thrombosis in both upper  extremities.    Signature   ----------------------------------------------------------------  Electronically signed by Roderick Mario(Sonographer) on  04/22/2019 11:46 AM  ----------------------------------------------------------------   ----------------------------------------------------------------  Electronically signed by Albert Oliveira(Interpreting  physician) on 04/22/2019 09:31 PM !Yes            !None      ! +------------------------------------+----------+---------------+----------+ ! Prox Radial                         !Yes       ! Yes            ! None      ! +------------------------------------+----------+---------------+----------+ ! Dist Radial                         !Yes       ! Yes            ! None      ! +------------------------------------+----------+---------------+----------+ ! Prox Ulnar                          ! Yes       ! Yes            ! None      ! +------------------------------------+----------+---------------+----------+ ! Dist Ulnar                          ! Yes       ! Yes            ! None      ! +------------------------------------+----------+---------------+----------+ ! Basilic at UA                       ! Yes       ! Yes            ! None      ! +------------------------------------+----------+---------------+----------+ ! Basilic at AF                       ! Yes       ! Yes            ! None      ! +------------------------------------+----------+---------------+----------+ ! Basilic at [address]                       ! Yes       ! Yes            ! None      ! +------------------------------------+----------+---------------+----------+ ! Cephalic at UA                      ! Yes       ! Yes            ! None      ! +------------------------------------+----------+---------------+----------+ ! Cephalic at AF                      ! Yes       ! Yes            ! None      ! +------------------------------------+----------+---------------+----------+ ! Cephalic at [address]                      ! Yes       ! Yes            ! None      ! +------------------------------------+----------+---------------+----------+ Doppler Measurements +-------------------------+-----------------------+------------------------+ ! Location                 ! Signal                 !Reflux                  ! +-------------------------+-----------------------+------------------------+ ! SCV                      ! Phasic !No                      ! +-------------------------+-----------------------+------------------------+ ! Axillary                 ! Phasic                 ! No                      ! +-------------------------+-----------------------+------------------------+ ! Brachial                 !Phasic                 ! No                      ! +-------------------------+-----------------------+------------------------+ Left UE Vein Measurements 2D Measurements +------------------------------------+----------+---------------+----------+ ! Location                            ! Visualized! Compressibility! Thrombosis! +------------------------------------+----------+---------------+----------+ ! Prox IJV                            ! Yes       ! Yes            ! None      ! +------------------------------------+----------+---------------+----------+ ! Dist IJV                            ! Yes       ! Yes            ! None      ! +------------------------------------+----------+---------------+----------+ ! Prox SCV                            ! Yes       ! Yes            ! None      ! +------------------------------------+----------+---------------+----------+ ! Dist SCV                            ! Yes       ! Yes            ! None      ! +------------------------------------+----------+---------------+----------+ ! Prox Axillary                       ! Yes       ! Yes            ! None      ! +------------------------------------+----------+---------------+----------+ ! Dist Axillary                       ! Yes       ! Yes            ! None      ! +------------------------------------+----------+---------------+----------+ ! Prox Brachial                       !Yes       ! Yes            ! None      ! +------------------------------------+----------+---------------+----------+ ! Dist Brachial                       !Yes       ! Yes            ! None      ! +------------------------------------+----------+---------------+----------+ ! Prox Radial !Yes       !Yes            ! None      ! +------------------------------------+----------+---------------+----------+ ! Dist Radial                         !Yes       ! Yes            ! None      ! +------------------------------------+----------+---------------+----------+ ! Prox Ulnar                          ! Yes       ! Yes            ! None      ! +------------------------------------+----------+---------------+----------+ ! Dist Ulnar                          ! Yes       ! Yes            ! None      ! +------------------------------------+----------+---------------+----------+ ! Basilic at UA                       ! Yes       ! Yes            ! None      ! +------------------------------------+----------+---------------+----------+ ! Cephalic at UA                      ! Yes       ! Yes            ! None      ! +------------------------------------+----------+---------------+----------+ ! Cephalic at AF                      ! Yes       ! Yes            ! None      ! +------------------------------------+----------+---------------+----------+ Doppler Measurements +-------------------------+-----------------------+------------------------+ ! Location                 ! Signal                 !Reflux                  ! +-------------------------+-----------------------+------------------------+ ! IJV                      ! Phasic                 !                        ! +-------------------------+-----------------------+------------------------+ ! SCV                      ! Phasic                 !                        ! +-------------------------+-----------------------+------------------------+ ! Axillary                 ! Phasic                 !                        ! +-------------------------+-----------------------+------------------------+ ! Brachial                 !Phasic                 !                        ! +-------------------------+-----------------------+------------------------+    Ir Cholecystostomy Percutaneous Complete    Result Date: 4/22/2019  PROCEDURE: ULTRASOUND and fluoroscopic GUIDED CHOLECYSTOSTOMY TUBE PLACEMENT April 22, 2019 HISTORY: ORDERING SYSTEM PROVIDED HISTORY: acute cholecystitis TECHNOLOGIST PROVIDED HISTORY: acute cholecystitis SEDATION: 75 mcg IV fentanyl for pain TECHNIQUE: Informed consent was obtained after a detailed explanation of the procedure including risks, benefits, and alternatives. Universal protocol was followed. A suitable skin site was prepped and draped in sterile fashion following ultrasound localization. An 18 gauge needle was advanced under ultrasound guidance into the gallbladder via transhepatic route and a 0.035 guidewire was used to place a 8 [place] cholecystostomy tube under fluoroscopic guidance. The catheter was sutured to the skin and the patient tolerated the procedure well. Thick bilious material was sent for laboratory analysis. The catheter was attached to gravity drainage. FINDINGS: Ultrasound image demonstrates distended gallbladder. Bilious fluid was sent for culture. Successful placement of transhepatic 8 [place] percutaneous cholecystostomy tube. Nm Hepatobiliary Scan W Ejection Fraction    Result Date: 4/20/2019  EXAMINATION: NUCLEAR MEDICINE HEPATOBILIARY SCINTIGRAPHY (HIDA SCAN). 4/20/2019 2:15 pm TECHNIQUE: Approximately 5.6 mbmdemdotbqTh23i Mebrofenin (Choletec) was administered IV. Then, dynamic images of the abdomen were obtained in the anterior projection for 60 mins. A right lateral view was also obtained at 60 mins. Delayed images up to 4 hours were obtained. COMPARISON: Ultrasound 04/19/2019 HISTORY: ORDERING SYSTEM PROVIDED HISTORY: CHOLECYSTITIS TECHNOLOGIST PROVIDED HISTORY: Ordering Physician Provided Reason for Exam: Cholecystitis Acuity: Unknown Type of Exam: Unknown FINDINGS: Prompt, homogenous uptake by the liver is noted with normal appearance of radiotracer excretion into the biliary system.   Clearance of bloodpool activity appears appropriate. Normal small bowel activity is seen. Prominent activity within the common bile duct. The gallbladder is not visualized by the end of the exam.     Absent filling of the gallbladder consistent with acute cholecystitis. Physical Examination:        Physical Exam   Constitutional:   Cachectic . HENT:   Mouth/Throat: Oropharynx is clear and moist.   Eyes:   Pale conjunctiva    Neck: Neck supple. Cardiovascular: Normal rate, regular rhythm and normal heart sounds. Pulmonary/Chest: Effort normal and breath sounds normal.   Abdominal: Soft. Bowel sounds are normal. There is tenderness (lower abdomen ). Musculoskeletal: She exhibits edema (mild bilateral ). Neurological: She is alert. Skin: Skin is warm and dry. Nursing note and vitals reviewed.         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]     Septic shock ([address] [A41.9, R65.21]     Leukemoid reaction [D72.823]     Aspiration pneumonia of right lower lobe due to vomit ([address] [J69.0]     MRSA carrier [Z22.322]     Sepsis due to urinary tract infection ([address] [A41.9, N39.0] 04/11/2019    Cystitis [N30.90] 04/11/2019       Plan:        Sepsis secondary to e.coli, emphysematous cystitis - resolved   WBC wnl  Urology consulted - appreciate recs               -SASHA dced (4/30)  Gen surg consulted - appreciate recs  ID consulted - appreciate recs    Cholecystotomy tube 4/22   Lasix continued - monitor urine output       GI Bleed  Bright red stool still present   NM GI scan negative for active bleeding   Lovenox and plavix held  Hb 6.7->8.4->7.6->7.2  Transfuse for hb <7.0  GI consulted - appreciate recs    Plan for colonoscopy tomorrow     Pneumonia +MRSA   CXR: continued bilateral effusions, bibasilar opactiies/bilateral interstitial opacites in upper lobes   Cont. abx per ID   Pulm consulted - appreciate recs              -wean to prednisone 10mg daily      Sinus Tachycardia, improved   Stable  Hx of CBA, bradycardia with pacemaker   Lopressor 12.5 BID   Cont. To monitor      Acute cholecystitis   General surgery consulted - appreciate recs   Surgical intervention: cholecystomy tube 4/22    Cholecystectomy in patient when medically stable      Gastroparesis   Per GI: protonix  Reglan started on 4/17, daily EKG  Considering systemic autonomic dysfunction as overlying diagnosis (gastroparesis, neurogenic bladder, hypotension/fluctuating Bps)         Oral Candidiasis - improved     Anemia, most likely 2/2 bleeding   Transfused               1 unit 4/14              2 unit 4/16              1 unit 5/3     Thrombocytopenia - improved  Platelets 196  hold lovenox  Cont. To monitor      Hypokalemia   Potassium sliding scale   4.5    Hypomagnesemia   Mg replacement   1.7      Hypophosphatemia   Sodium phosphate prn     Left knee remote distal tibia fx w/ osteopenia   Ortho to remove brace      Maintenance  Lovenox held   Dulera, Xopenex, Symbicort, Atrovent  Continue home meds trazodone, ASA, Plavix, Norvasc, Effexor, Spiriva     Dispo  PT/OT eval and treat   Social work dc planning          River Singh MD  5/5/2019  9:38 AM     Attending Physician Statement  I have discussed the care of Nehemiaskota Chang and I have examined the patient myselft and taken ros and hpi , including pertinent history and exam findings,  with the resident. I have reviewed the key elements of all parts of the encounter with the resident.   I agree with the assessment, plan and orders as documented by the resident.   Ct abdo today  Will rev with results      Electronically signed by Cely Gimenez MD

## 2019-05-05 NOTE — FLOWSHEET NOTE
05/05/19 1431   Encounter Summary   Services provided to: Patient   Referral/Consult From: Ольга   Continue Visiting   (5/5/19V)   Volunteer Visit Yes   Complexity of Encounter Low   Length of Encounter 15 minutes   Routine   Type Follow up   Spiritual/Temple   Type Spiritual support   Intervention [address] Patient received communion

## 2019-05-05 NOTE — PROGRESS NOTES
RN called and spoke with the answering service for Dr. Hernan Espana. They paged and spoke with him. He stated that the cast could be removed from the patient's leg either by the ER physician or by an Orthopedic surgeon that rounds at [facility].

## 2019-05-05 NOTE — PROGRESS NOTES
Patient was found to have a small bowel obstruction via testing. At this time this writer, as well as Vanesa Jenkins, VICKY, attended the patient's bedside to place a nasogastric tube. The patient adamantly refused placement. This writer, along with Vanesa Jenkins, VICKY, explained in detail the risks of refusal, as well as the benefits of placement; however the patient again refused. Patient is alert and oriented x4 and states that she, \"does not want to do any of this, just leave me alone. \" It was explained again that the patient had the right to refuse, however it would be in her best interest to allow an attempt at placement at least. Again, \"Leave me alone. \"     This writer and Vanesa Jenkins RN, made one last attempt to persuade the patient to no avail. At this time we left the patient's bedside and stated we would document refusal, however if she changes her mind she is to hit her call light and we will be more than happy to place the NG should she wish. No further questions or concerns at this time.

## 2019-05-05 NOTE — PROGRESS NOTES
RN attempted to get patient to drink more go-lytely. Patient drank a few sips and refused to continue drinking.

## 2019-05-05 NOTE — PROGRESS NOTES
tablet Take 0.5 mg by mouth every 6 hours as needed for Anxiety.  therapeutic multivitamin-minerals (THERAGRAN-M) tablet Take 1 tablet by mouth daily.  omeprazole (PRILOSEC) 20 MG capsule Take 20 mg by mouth daily.  venlafaxine (EFFEXOR) 37.5 MG tablet Take 37.5 mg by mouth 3 times daily.  Misc. Devices [facility]) [id number] [place] wheelchair with left fupper extremity support  Dx: stroke with left hemiparesis. 1 each 0           Allergies  Allergies   Allergen Reactions    Penicillins Shortness Of Breath and Rash    Amoxicillin         Social   Social History     Tobacco Use    Smoking status: Current Some Day Smoker     Packs/day: 1.00     Years: 30.00     Pack years: 30.00    Smokeless tobacco: Never Used   Substance Use Topics    Alcohol use: Not Currently     Alcohol/week: 16.8 oz     Types: 28 Glasses of wine per week                History reviewed. No pertinent family history. Review of Systems  Other than above 12 systems reviewed negative . Tolerating antibiotics. Physical Exam  BP (!) 120/59   Pulse 106   Temp 97.6 °F (36.4 °C) (Oral)   Resp 18   Ht 5' (1.524 m)   Wt 111 lb 8.8 oz (50.6 kg)   SpO2 94%   BMI 21.79 kg/m²           General Appearance: alert ,NAD . Skin: warm and dry, no rash or erythema  Head: normocephalic and atraumatic  Eyes: pupils equal, round  Neck: neck supple and non tender   Pulmonary/Chest: coarse to auscultation bilaterally- no wheezes, rales or rhonchi  Cardiovascular: normal rate, regular rhythm, normal S1 and S2, no murmurs.   Abdomen: soft,mild upper abdomen tenderness  -distended,  no masses or organomegaly, gallbladder drain with  bile drainage   Extremities: no cyanosis, clubbing   Edema   PICC line in place       Data Review:    Recent Labs     05/03/19  0425  05/04/19  0420  05/05/19  0003 05/05/19  0619 05/05/19  1220   WBC 6.7  --  10.3  --   --  9.1  --    HGB 6.4*   < > 7.6*   < > 7.1* 7.2* 8.7*   HCT 19.5*   < > 23.2*   < > 21.9* 21.7* 26.4*   MCV 87.3  --  87.9  --   --  87.6  --    *  --  172  --   --  196  --     < > = values in this interval not displayed. Recent Labs     05/03/19 0425 05/04/19 0420 05/05/19 0619    133* 131*   K 3.4* 4.4 4.5   CL 99 98 97*   CO2 28 26 26   PHOS 3.3  --   --    BUN 20 23 21   CREATININE <0.40* <0.40* <0.40*     Recent Labs     05/03/19 0425 05/04/19 0420 05/05/19 0619   AST 16 18 18   ALT 13 14 15   BILITOT 0.34 0.46 0.35   ALKPHOS 103 117* 148*     No results for input(s): LIPASE, AMYLASE in the last 72 hours. Recent Labs     05/03/19 0425 05/04/19 1215 05/05/19 0619   PROTIME 15.3* 15.1* 15.3*   INR 1.2 1.2 1.2       Imaging Studies:                           All appropriate imaging studies and reports reviewed: Yes       Ct Abdomen Pelvis Wo Contrast Additional Contrast? Oral    Result Date: 4/14/2019  EXAMINATION: CT OF THE ABDOMEN AND PELVIS WITHOUT CONTRAST 4/14/2019 7:34 pm TECHNIQUE: CT of the abdomen and pelvis was performed without the administration of intravenous contrast. Multiplanar reformatted images are provided for review. Dose modulation, iterative reconstruction, and/or weight based adjustment of the mA/kV was utilized to reduce the radiation dose to as low as reasonably achievable. COMPARISON: 04/11/2019 HISTORY: ORDERING SYSTEM PROVIDED HISTORY: ABDOMINAL PAIN TECHNOLOGIST PROVIDED HISTORY: Water soluble contrast only please Ordering Physician Provided Reason for Exam: Abdominal pain - Vented patient. Contrast given via nurse through NG tube. Acuity: Unknown Type of Exam: Unknown Relevant Medical/Surgical History: Hx - Sepsis due to urinary tract infection. FINDINGS: Lower Chest: New moderate layering bilateral pleural effusions with bilateral lower lobe atelectasis. Organs: Limited evaluation due lack of intravenous contrast.  Cholelithiasis redemonstrated. No gallbladder wall thickening or biliary ductal dilatation.  Scattered tiny hypodense lesions in the liver are too small to characterize but statistically represent benign cysts or hemangiomas and appear unchanged. The pancreas, spleen, adrenal glands, and kidneys are unremarkable. There is no hydronephrosis or urinary tract calculus. GI/Bowel: The stomach is distended. Enteric tube is in place. No contrast is seen distal to the pylorus and there is contrast reflux into the distal esophagus. There is no evidence of bowel obstruction. The appendix is not definitely visualized. No focal pericecal inflammatory changes are evident. Pelvis: The urinary bladder is decompressed by Tran catheter. No pelvic mass is seen. Peritoneum/Retroperitoneum: Small amount of free fluid in the pelvic cavity. No free air or focal fluid collection. No abnormal lymph node. Normal abdominal aortic caliber. Moderate calcific atherosclerosis. Bones/Soft Tissues: No acute osseous abnormality. Diffuse anasarca. Moderate degenerative changes in the lumbar spine. 1. Distended, contrast filled stomach with reflux of contrast into the esophagus. No enteric contrast is seen distal to the pylorus suggesting delayed gastric emptying in the setting of ileus. 2. New moderate layering bilateral pleural effusions with bilateral lower lobe atelectasis. 3. Small amount of nonspecific free fluid in the pelvic cavity. 4. Anasarca. 5. Cholelithiasis. Xr Chest (single View Frontal)    Result Date: 4/13/2019  EXAMINATION: SINGLE XRAY VIEW OF THE CHEST 4/13/2019 7:18 am COMPARISON: April 12, 2019 HISTORY: ORDERING SYSTEM PROVIDED HISTORY: dyspnea TECHNOLOGIST PROVIDED HISTORY: dyspnea Ordering Physician Provided Reason for Exam: dyspnea Acuity: Acute Type of Exam: Subsequent/Follow-up Additional signs and symptoms: dyspnea FINDINGS: A right IJ catheter is seen with its tip terminating at the superior cavoatrial junction. The left chest wall pacemaker and leads are stable. The cardiomediastinal silhouette is stable.   There is interval increased opacity at the right lung base, may be related to atelectasis versus pneumonia. There is small atelectasis and mild pleural effusion at the left lung base. There is no pneumothorax. There is no acute osseous abnormality. Interval increased opacity at the right lung base, may be related to atelectasis versus pneumonia. Small atelectasis and mild pleural effusion at the left lung, new since the prior study. Xr Femur Left (min 2 Views)    Result Date: 3/27/2019  EXAMINATION: 4 XRAY VIEWS OF THE LEFT FEMUR; 2 XRAY VIEWS OF THE LEFT KNEE; 3 XRAY VIEWS OF THE LEFT TIBIA AND FIBULA 3/27/2019 7:00 pm COMPARISON: Left hip 11/14/2015. HISTORY: ORDERING SYSTEM PROVIDED HISTORY: pain TECHNOLOGIST PROVIDED HISTORY: pain Ordering Physician Provided Reason for Exam: pt twisted wrong, lt knee pain, unable to straighten knee. Acuity: Acute Type of Exam: Initial FINDINGS: Left femur, four views: The bones are diffusely osteopenic. No acute fracture deformity. The hip joint is maintained. The femoral head projects within the acetabulum. No focal soft tissue abnormality is seen. Left knee, two views: The knee is flexed. No acute osseous abnormality. No joint effusion. Joint spaces are not optimally profiled but no significant arthritic changes are apparent. Left tib-fib, three views: There is evidence of a remote healed distal tibia fracture. No acute fracture or dislocation is seen. No focal soft tissue abnormality. No acute osseous abnormality of the left femur. No acute osseous abnormality of the left knee. No acute osseous abnormality of the left tib-fib. Healed remote distal tibia fracture. Marked osteopenia, likely due to disuse. Xr Knee Left (1-2 Views)    Result Date: 3/27/2019  EXAMINATION: 4 XRAY VIEWS OF THE LEFT FEMUR; 2 XRAY VIEWS OF THE LEFT KNEE; 3 XRAY VIEWS OF THE LEFT TIBIA AND FIBULA 3/27/2019 7:00 pm COMPARISON: Left hip 11/14/2015.  HISTORY: ORDERING SYSTEM PROVIDED HISTORY: pain TECHNOLOGIST PROVIDED HISTORY: pain Ordering Physician Provided Reason for Exam: pt twisted wrong, lt knee pain, unable to straighten knee. Acuity: Acute Type of Exam: Initial FINDINGS: Left femur, four views: The bones are diffusely osteopenic. No acute fracture deformity. The hip joint is maintained. The femoral head projects within the acetabulum. No focal soft tissue abnormality is seen. Left knee, two views: The knee is flexed. No acute osseous abnormality. No joint effusion. Joint spaces are not optimally profiled but no significant arthritic changes are apparent. Left tib-fib, three views: There is evidence of a remote healed distal tibia fracture. No acute fracture or dislocation is seen. No focal soft tissue abnormality. No acute osseous abnormality of the left femur. No acute osseous abnormality of the left knee. No acute osseous abnormality of the left tib-fib. Healed remote distal tibia fracture. Marked osteopenia, likely due to disuse. Xr Knee Left (3 Views)    Result Date: 3/30/2019  EXAMINATION: 3 XRAY VIEWS OF THE LEFT KNEE 3/30/2019 10:58 am COMPARISON: March 27, 2018 HISTORY: ORDERING SYSTEM PROVIDED HISTORY: F/u L knee pain after cast TECHNOLOGIST PROVIDED HISTORY: AP, oblique, lateral F/u L knee pain after cast FINDINGS: Marked osteopenia. Interval casting, which degrades fine osseous detail question cortical offset in the lateral femoral metaphysis. No malalignment identified. No significant joint effusion. Interval casting. Question nondisplaced fracture in the lateral femoral metaphysis. Osteopenia. Xr Tibia Fibula Left (2 Views)    Result Date: 3/27/2019  EXAMINATION: 4 XRAY VIEWS OF THE LEFT FEMUR; 2 XRAY VIEWS OF THE LEFT KNEE; 3 XRAY VIEWS OF THE LEFT TIBIA AND FIBULA 3/27/2019 7:00 pm COMPARISON: Left hip 11/14/2015.  HISTORY: ORDERING SYSTEM PROVIDED HISTORY: pain TECHNOLOGIST PROVIDED HISTORY: pain Ordering Physician Provided Reason for Exam: pt twisted wrong, lt knee pain, unable to straighten knee. Acuity: Acute Type of Exam: Initial FINDINGS: Left femur, four views: The bones are diffusely osteopenic. No acute fracture deformity. The hip joint is maintained. The femoral head projects within the acetabulum. No focal soft tissue abnormality is seen. Left knee, two views: The knee is flexed. No acute osseous abnormality. No joint effusion. Joint spaces are not optimally profiled but no significant arthritic changes are apparent. Left tib-fib, three views: There is evidence of a remote healed distal tibia fracture. No acute fracture or dislocation is seen. No focal soft tissue abnormality. No acute osseous abnormality of the left femur. No acute osseous abnormality of the left knee. No acute osseous abnormality of the left tib-fib. Healed remote distal tibia fracture. Marked osteopenia, likely due to disuse. Xr Abdomen (kub) (single Ap View)    Result Date: 4/14/2019  EXAMINATION: SINGLE SUPINE XRAY VIEW(S) OF THE ABDOMEN 4/14/2019 7:39 am COMPARISON: CT abdomen and pelvis  film from 11 April 2019 HISTORY: [facility]: Abd Distention TECHNOLOGIST PROVIDED HISTORY: Abd Distention Ordering Physician Provided Reason for Exam: Abdominal distention. Pt was moving around in the bed. Best films at present time. Acuity: Acute Type of Exam: Initial Additional signs and symptoms: Abdominal distention. Pt was moving around in the bed. Best films at present time. FINDINGS: Portable view time stamped at 748 hours demonstrates an intestinal tube terminating in the midportion of a gaseous Spike dilated stomach. Densities are present over the stomach likely medication. Bipolar pacemaker is in situ with intact leads. Heart size is top-normal, stable. Gaseous distension of the stomach and loop of bowel in the upper mid abdomen is noted but there is gas and fecal material in the rectum.   Gastric outlet obstruction or proximal small bowel partial obstruction is suspected. No free air is noted. [place]line [place] is present over the pelvis likely a monitor or CT small bore catheter. Vascular calcification is present in the pelvis. Persistent preferential gaseous distension of the stomach although there is some gas in the upper abdomen and gas and fecal material present in the rectosigmoid. Findings suggest gastric outlet obstruction. Ct Abdomen Pelvis W Iv Contrast    Result Date: 4/11/2019  EXAMINATION: CT OF THE ABDOMEN AND PELVIS WITH CONTRAST 4/11/2019 5:14 pm TECHNIQUE: CT of the abdomen and pelvis was performed with the administration of intravenous contrast. Multiplanar reformatted images are provided for review. Dose modulation, iterative reconstruction, and/or weight based adjustment of the mA/kV was utilized to reduce the radiation dose to as low as reasonably achievable. COMPARISON: None. HISTORY: ORDERING SYSTEM PROVIDED HISTORY: Abdominal pain TECHNOLOGIST PROVIDED HISTORY: IV Only Contrast Ordering Physician Provided Reason for Exam: patient c/o abd pain for an hour FINDINGS: Lower Chest: Trace pleural fluid bilaterally is noted. Indwelling cardiac pacemaker is present. Heart size is normal.  Coronary artery calcifications are evident. The esophagus is significantly dilated and fluid-filled. No paraesophageal adenopathy is evident. Organs: The spleen, pancreas, and adrenals are unremarkable. The liver contains hypodensities, likely cysts. The gallbladder is distended and contains a solitary gallstone. The kidneys excrete contrast bilaterally. Extrarenal collecting systems are noted. The ureters are mildly dilated down to the urinary bladder without evidence of intraluminal or ureteral obstructing calculi. GI/Bowel: Marked distention of the stomach is noted; this continues to the pylorus with appearance suggesting partial gastric outlet obstruction.   Fluid is present in some small bowel loops and colon distal to the stomach. No small bowel obstruction. Pelvis: Marked distention of the urinary bladder is noted. 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 Distended ureters may be related to reflux or infection. No free pelvic fluid, pelvic or inguinal adenopathy is noted. Peritoneum/Retroperitoneum: No aortic aneurysm. Mild to moderate plaque is noted in the infrarenal abdominal aorta and both proximal iliac arteries. Shotty lymph nodes are present around the aorta in the upper abdomen. No mesenteric adenopathy is noted. Bones/Soft Tissues: Degenerative changes are present in the hips and lower lumbar facets. Estimated biologic radiation dose for this procedure:258.77 mGy/cm2.     1. Dilatation of the thoracic esophagus filled with fluid. No obstructive process is noted. No adjacent enlarged lymph nodes. 2. Trace pleural fluid. 3. Marked distention of the stomach. This appears to extend to the pylorus. Partial gastric outlet obstruction is not excluded. 4. Bilateral dilated ureters without obstructing calculi. Urinary bladder is markedly distended with bladder wall air suggesting emphysematous cystitis. Dilatation of the ureters may be related to reflux or infection. 5. Atherosclerotic disease. 6. Other findings as above. Critical results were called by Dr. Seymour Hannah MD to [address] on 4/11/2019 at 17:37. Xr Chest Portable    Result Date: 4/16/2019  EXAMINATION: SINGLE XRAY VIEW OF THE CHEST 4/16/2019 6:54 am COMPARISON: 04/15/2019, 610 hours HISTORY: ORDERING SYSTEM PROVIDED HISTORY: ETT placement TECHNOLOGIST PROVIDED HISTORY: ETT placement Ordering Physician Provided Reason for Exam: on vent Acuity: Acute Type of Exam: Initial 78-year-old female on ventilator; check endotracheal tube placement FINDINGS: Portable AP upright view of the chest. Endotracheal tube distal tip overlying the mid trachea approximately 4.1 cm above the level of the ron.  Enteric tube traverses the GE junction with distal tip excluded from the field of view. Left subclavian approach cardiac pacemaker device distal lead tips relatively stable in position. Right internal jugular approach central venous catheter distal tip overlying the high right atrium, stable. Cardiac monitor leads overlie the chest. Atherosclerotic calcification of the thoracic aorta. Slight stable volume loss of the left hemithorax. No pneumothorax. No free air. Dense retrocardiac/left basilar airspace consolidation and small left-sided pleural effusion. Stable mild focal opacity at the right mid lung zone. Underlying COPD. Stable mild pulmonary vascular congestion and left-sided predominant parahilar opacity. Visualized osseous structures remain unchanged. 1. Stable multifocal airspace disease as detailed above with dense retrocardiac/left basilar airspace consolidation and small left-sided pleural effusion. Mild pulmonary vascular congestion. Findings may represent edema or multifocal pneumonia. 2. Underlying COPD. 3. Tubes and line as detailed above. Xr Chest Portable    Result Date: 4/15/2019  EXAMINATION: SINGLE XRAY VIEW OF THE CHEST 4/15/2019 6:47 am COMPARISON: 14 April 2019 HISTORY: ORDERING SYSTEM PROVIDED HISTORY: ETT placement TECHNOLOGIST PROVIDED HISTORY: ETT placement Ordering Physician Provided Reason for Exam: on vent Acuity: Acute Type of Exam: Initial FINDINGS: AP portable view of the chest time stamped at 612 hours demonstrates overlying cardiac monitoring electrodes. Endotracheal tube terminates 4 cm above the ron. Bipolar pacemaker enters from the left with intact leads in appropriate positions. Intestinal tube extends beyond the fundus of the stomach, tip not included. Right internal jugular catheter terminates at the cavoatrial junction. Heart size is normal.  Aortic arch is calcified. There is interval improvement in vascular congestion with resolution of perihilar opacities. IJ central venous catheter with tip near the superior atrial caval junction. No pneumothorax. No new consolidation. Thank you for allowing me to participate in the care of your patient. Please feel free to contact me with any questions or concerns.      Hermila Anaya MD

## 2019-05-05 NOTE — PROGRESS NOTES
RN called and notified Dr. Jojo Green of consult. He was given a report on the patient status and reason for consult. He stated that he can likely remove the cast tomorrow.

## 2019-05-05 NOTE — PROGRESS NOTES
DATE: 2019    NAME: Ned Noriega  MRN: [id number]   : 1950    Patient not seen this date for Physical Therapy due to:  [] Blood transfusion in progress  [] Hemodialysis  [x]  Patient Declined-too tired today  [] Spine Precautions   [] Strict Bedrest  [] Surgery/ Procedure  [] Testing      [] Other        [] PT being discontinued at this time. Patient independent. No further needs. [] PT being discontinued at this time as the patient has been transferred to palliative care. No further needs.     Maria E Ferreira, PT

## 2019-05-05 NOTE — PROGRESS NOTES
[facility]    Gastroenterology Daily Progress Note      Patient:   Ashvin Ferreira   :    1950   Facility:   [facility]   Date:     2019  Consultant:   Anna Amor CNP      SUBJECTIVE  77 y.o. female admitted 2019 with Sepsis due to urinary tract infection ([facility] [address] [A41.9, N39.0]  Cystitis [N30.90]  Cystitis [N30.90] and seen for anemia with rectal bleeding and abdominal pain. The pt was seen and examined. She had one bright red bloody stool last night but none today. She is reporting pain around her cholecystostomy tube. She has drank only a very small portion of her prep for the colonoscopy tomorrow. She denies nausea; the TPN continues.          OBJECTIVE  Scheduled Meds:   sodium chloride  250 mL Intravenous Once    sodium chloride  250 mL Intravenous Once    predniSONE  10 mg Oral Daily    mometasone-formoterol  2 puff Inhalation BID    pantoprazole  40 mg Oral QAM AC    metoprolol tartrate  12.5 mg Oral BID    fat emulsion  100 mL Intravenous Daily    furosemide  40 mg Intravenous Daily    magnesium sulfate  1 g Intravenous Once    ipratropium  0.5 mg Nebulization Q4H    insulin lispro  0-12 Units Subcutaneous Q6H    levalbuterol  1.25 mg Nebulization Q4H    venlafaxine  37.5 mg Oral TID    aspirin  81 mg Oral Daily    sodium chloride flush  10 mL Intravenous 2 times per day       Vital Signs:  BP (!) 120/59   Pulse 106   Temp 97.6 °F (36.4 °C) (Oral)   Resp 18   Ht 5' (1.524 m)   Wt 111 lb 8.8 oz (50.6 kg)   SpO2 94%   BMI 21.79 kg/m²      Physical Exam:   General appearance: Alert & oriented, ill appearing NAD  Lungs: CTA bilaterally    Heart: S1S2 RRR  Abdomen: Soft, Nontender, Not distended, BS WNL cholecystostomy tube with bile drainage site with erythema  Skin: No jaundice, No clubbing, No cyanosis    Lab and Imaging Review     CBC  Recent Labs     19  0425  19  0420  19  0003 19  0619 19  1220   WBC 6.7  -- Melodie Patel (MRN [id number]) as of 5/4/2019 12:26    Ref. Range 4/15/2019 11:10   AFP (Alpha Fetoprotein) Latest Ref Range: <8.4 ug/L 1.8      FINDINGS:hida 4/20/19   Prompt, homogenous uptake by the liver is noted with normal appearance of   radiotracer excretion into the biliary system.  Clearance of bloodpool   activity appears appropriate.       Normal small bowel activity is seen.  Prominent activity within the common   bile duct.  The gallbladder is not visualized by the end of the exam.           Impression   Absent filling of the gallbladder consistent with acute cholecystitis.         FINDINGS:ct abd 4/14/19   Lower Chest: New moderate layering bilateral pleural effusions with bilateral   lower lobe atelectasis.       Organs: Limited evaluation due lack of intravenous contrast.  Cholelithiasis   redemonstrated.  No gallbladder wall thickening or biliary ductal dilatation. Scattered tiny hypodense lesions in the liver are too small to characterize   but statistically represent benign cysts or hemangiomas and appear unchanged. The pancreas, spleen, adrenal glands, and kidneys are unremarkable. Hollie Kocher is   no hydronephrosis or urinary tract calculus.       GI/Bowel: The stomach is distended.  Enteric tube is in place.  No contrast   is seen distal to the pylorus and there is contrast reflux into the distal   esophagus. Hollie Kocher is no evidence of bowel obstruction.  The appendix is not   definitely visualized.  No focal pericecal inflammatory changes are evident.       Pelvis: The urinary bladder is decompressed by Tran catheter.  No pelvic   mass is seen.       Peritoneum/Retroperitoneum: Small amount of free fluid in the pelvic cavity. No free air or focal fluid collection.  No abnormal lymph node.  Normal   abdominal aortic caliber.  Moderate calcific atherosclerosis.       Bones/Soft Tissues: No acute osseous abnormality.  Diffuse anasarca.    Moderate degenerative changes in the lumbar spine.         Impression   1. Distended, contrast filled stomach with reflux of contrast into the   esophagus.  No enteric contrast is seen distal to the pylorus suggesting   delayed gastric emptying in the setting of ileus. 2. New moderate layering bilateral pleural effusions with bilateral lower   lobe atelectasis. 3. Small amount of nonspecific free fluid in the pelvic cavity. 4. Anasarca. 5. Cholelithiasis.        FINDINGS:GB US 4/19/19   Pancreas is not well visualized.       No liver lesion.       Gallbladder wall thickening.  Gallbladder sludge.  Cholelithiasis.    Pericholecystic fluid.       Common bile duct measures at the upper limits of normal at 7 mm.           Impression   Findings suggesting acute cholecystitis.      ESOPHAGOGASTRODUODENOSCOPY   ( EGD )  DATE OF PROCEDURE: 4/16/2019      Chiara Katz MD        POSTOPERATIVE Vanesa Cordon has esophagitis.     Has a large amount of retained solid food in the upper part of the stomach and fundus.  Antrum is clear.  Pylorus wide open and did not show signs of stenosis.     OPERATION: Upper GI endoscopy          Findings:     Retropharyngeal area was grossly normal appearing     Esophagus: abnormal: Has a grade 2 esophagitis.  Appears peptic esophagitis.  Has a small sliding hiatal hernia.     Stomach:  Fundus of the stomach and body of the stomach.  With solid food.  75% of the lumen is occupied with solid food.     Antrum: No retained food.  Able to advance the scope through the pylorus without difficulty.  No pyloric stenosis seen.  No signs of outlet obstruction.     Duodenum:     Descending: normal    Bulb: normal  Minimal liquid present in the 2nd part of the duodenum.     FINDINGS:5/3/19 NM bleeding scan   Activity is seen within the blood pool, including the great vessels, heart,   liver, and spleen.  There was no abnormal accumulation of radioactivity in   the abdomen to suggest active bleeding during study acquisition.         Impression   No evidence of active GI bleeding during acquisition.          ASSESSMENT/PLAN:  1. Anemia with continued bright red blood per rectum  -will plan for colonoscopy tomorrow if she is able to drink the prep, detailed discussion with the pt about the importance of this was done, 2nd half of prep has been ordered,, npo after midnight  -trend the h/h and transfuse for hgb <7  -continue the ppi    2. Cholecystitis s/p cholecystectomy tube placement 4/22/19  -reports increased pain at site, CT has been ordered to rule out abcess      . 3.elevated tumor markers, mildly elevated alk phos, unable to have mri r/t pacemaker      This plan was formulated in collaboration with Dr. Isha Sebastian . Electronically signed by: AGAPITO Jc - CNP on 5/5/2019 at 3:06 PM     Attending Physician Statement  I have discussed the care of Lanie Paget and   I have examined the patient myselft and taken ros and hpi , including pertinent history and exam findings,  with the author of this note . I have reviewed the key elements of all parts of the encounter with the nurse practitioner/resident.     I agree with the assessment, plan and orders as documented by the above health care provider     Electronically signed by Dave Vela MD

## 2019-05-05 NOTE — PLAN OF CARE
Case discussed with Dr Raymond Aguirre, will cancel colonoscopy and do flex sig tomorrow since the pt is not drinking the prep. Will order enemas x2 tomorrow prior to procedure . Marly Ragsdale, APRN - CNP

## 2019-05-05 NOTE — PROGRESS NOTES
RN notified Dr. Merrill Wilson that the Ct abd showed a SBO. New orders received to place an NG tube to [facility] and make patient NPO. Dr. Emily Charles also notified.

## 2019-05-06 ENCOUNTER — ANESTHESIA EVENT (OUTPATIENT)
Dept: OPERATING ROOM | Age: 69
DRG: 853 | End: 2019-05-06
Payer: COMMERCIAL

## 2019-05-06 ENCOUNTER — ANESTHESIA (OUTPATIENT)
Dept: OPERATING ROOM | Age: 69
DRG: 853 | End: 2019-05-06
Payer: COMMERCIAL

## 2019-05-06 LAB
ABSOLUTE BANDS #: 1.41 K/UL (ref 0–1)
ABSOLUTE EOS #: 0.09 K/UL (ref 0–0.4)
ABSOLUTE IMMATURE GRANULOCYTE: ABNORMAL K/UL (ref 0–0.3)
ABSOLUTE LYMPH #: 0.66 K/UL (ref 1–4.8)
ABSOLUTE MONO #: 0.38 K/UL (ref 0.1–1.3)
ALBUMIN SERPL-MCNC: 1.8 G/DL (ref 3.5–5.2)
ALBUMIN/GLOBULIN RATIO: ABNORMAL (ref 1–2.5)
ALP BLD-CCNC: 161 U/L (ref 35–104)
ALT SERPL-CCNC: 15 U/L (ref 5–33)
ANION GAP SERPL CALCULATED.3IONS-SCNC: 8 MMOL/L (ref 9–17)
AST SERPL-CCNC: 15 U/L
BANDS: 15 % (ref 0–10)
BASOPHILS # BLD: 0 % (ref 0–2)
BASOPHILS ABSOLUTE: 0 K/UL (ref 0–0.2)
BILIRUB SERPL-MCNC: 0.31 MG/DL (ref 0.3–1.2)
BUN BLDV-MCNC: 21 MG/DL (ref 8–23)
BUN/CREAT BLD: ABNORMAL (ref 9–20)
C-REACTIVE PROTEIN: 89.6 MG/L (ref 0–5)
CALCIUM SERPL-MCNC: 7.5 MG/DL (ref 8.6–10.4)
CHLORIDE BLD-SCNC: 97 MMOL/L (ref 98–107)
CO2: 27 MMOL/L (ref 20–31)
CREAT SERPL-MCNC: <0.4 MG/DL (ref 0.5–0.9)
DIFFERENTIAL TYPE: ABNORMAL
EOSINOPHILS RELATIVE PERCENT: 1 % (ref 0–4)
GFR AFRICAN AMERICAN: ABNORMAL ML/MIN
GFR NON-AFRICAN AMERICAN: ABNORMAL ML/MIN
GFR SERPL CREATININE-BSD FRML MDRD: ABNORMAL ML/MIN/{1.73_M2}
GFR SERPL CREATININE-BSD FRML MDRD: ABNORMAL ML/MIN/{1.73_M2}
GLUCOSE BLD-MCNC: 108 MG/DL (ref 65–105)
GLUCOSE BLD-MCNC: 109 MG/DL (ref 65–105)
GLUCOSE BLD-MCNC: 115 MG/DL (ref 65–105)
GLUCOSE BLD-MCNC: 115 MG/DL (ref 70–99)
GLUCOSE BLD-MCNC: 92 MG/DL (ref 65–105)
GLUCOSE BLD-MCNC: 95 MG/DL (ref 65–105)
HCT VFR BLD CALC: 21 % (ref 36–46)
HCT VFR BLD CALC: 21.4 % (ref 36–46)
HCT VFR BLD CALC: 21.7 % (ref 36–46)
HCT VFR BLD CALC: 23.3 % (ref 36–46)
HEMOGLOBIN: 6.9 G/DL (ref 12–16)
HEMOGLOBIN: 7.1 G/DL (ref 12–16)
HEMOGLOBIN: 7.2 G/DL (ref 12–16)
HEMOGLOBIN: 7.6 G/DL (ref 12–16)
IMMATURE GRANULOCYTES: ABNORMAL %
INR BLD: 1.2
LYMPHOCYTES # BLD: 7 % (ref 24–44)
MAGNESIUM: 1.8 MG/DL (ref 1.6–2.6)
MCH RBC QN AUTO: 29 PG (ref 26–34)
MCHC RBC AUTO-ENTMCNC: 33.3 G/DL (ref 31–37)
MCV RBC AUTO: 87.3 FL (ref 80–100)
METAMYELOCYTES ABSOLUTE COUNT: 0.47 K/UL
METAMYELOCYTES: 5 %
MONOCYTES # BLD: 4 % (ref 1–7)
MORPHOLOGY: ABNORMAL
MYELOCYTES ABSOLUTE COUNT: 0.47 K/UL
MYELOCYTES: 5 %
NRBC AUTOMATED: ABNORMAL PER 100 WBC
PDW BLD-RTO: 16.8 % (ref 11.5–14.9)
PHOSPHORUS: 3.3 MG/DL (ref 2.6–4.5)
PLATELET # BLD: 243 K/UL (ref 150–450)
PLATELET ESTIMATE: ABNORMAL
PMV BLD AUTO: 8.6 FL (ref 6–12)
POTASSIUM SERPL-SCNC: 4.2 MMOL/L (ref 3.7–5.3)
PREALBUMIN: 18.9 MG/DL (ref 20–40)
PROCALCITONIN: 0.09 NG/ML
PROTHROMBIN TIME: 15.5 SEC (ref 11.8–14.6)
RBC # BLD: 2.49 M/UL (ref 4–5.2)
RBC # BLD: ABNORMAL 10*6/UL
SEDIMENTATION RATE, ERYTHROCYTE: 78 MM (ref 0–20)
SEG NEUTROPHILS: 63 % (ref 36–66)
SEGMENTED NEUTROPHILS ABSOLUTE COUNT: 5.92 K/UL (ref 1.3–9.1)
SODIUM BLD-SCNC: 132 MMOL/L (ref 135–144)
TOTAL PROTEIN: 4.7 G/DL (ref 6.4–8.3)
WBC # BLD: 9.4 K/UL (ref 3.5–11)
WBC # BLD: ABNORMAL 10*3/UL

## 2019-05-06 PROCEDURE — 84145 PROCALCITONIN (PCT): CPT

## 2019-05-06 PROCEDURE — 2060000000 HC ICU INTERMEDIATE R&B

## 2019-05-06 PROCEDURE — 6360000002 HC RX W HCPCS: Performed by: INTERNAL MEDICINE

## 2019-05-06 PROCEDURE — 85014 HEMATOCRIT: CPT

## 2019-05-06 PROCEDURE — 36415 COLL VENOUS BLD VENIPUNCTURE: CPT

## 2019-05-06 PROCEDURE — 84100 ASSAY OF PHOSPHORUS: CPT

## 2019-05-06 PROCEDURE — 94640 AIRWAY INHALATION TREATMENT: CPT

## 2019-05-06 PROCEDURE — 2500000003 HC RX 250 WO HCPCS: Performed by: INTERNAL MEDICINE

## 2019-05-06 PROCEDURE — 2580000003 HC RX 258: Performed by: INTERNAL MEDICINE

## 2019-05-06 PROCEDURE — 80053 COMPREHEN METABOLIC PANEL: CPT

## 2019-05-06 PROCEDURE — 36592 COLLECT BLOOD FROM PICC: CPT

## 2019-05-06 PROCEDURE — 3609008400 HC SIGMOIDOSCOPY DIAGNOSTIC: Performed by: INTERNAL MEDICINE

## 2019-05-06 PROCEDURE — 85651 RBC SED RATE NONAUTOMATED: CPT

## 2019-05-06 PROCEDURE — 94761 N-INVAS EAR/PLS OXIMETRY MLT: CPT

## 2019-05-06 PROCEDURE — 6370000000 HC RX 637 (ALT 250 FOR IP): Performed by: INTERNAL MEDICINE

## 2019-05-06 PROCEDURE — 99233 SBSQ HOSP IP/OBS HIGH 50: CPT | Performed by: INTERNAL MEDICINE

## 2019-05-06 PROCEDURE — 85025 COMPLETE CBC W/AUTO DIFF WBC: CPT

## 2019-05-06 PROCEDURE — 84134 ASSAY OF PREALBUMIN: CPT

## 2019-05-06 PROCEDURE — 82947 ASSAY GLUCOSE BLOOD QUANT: CPT

## 2019-05-06 PROCEDURE — 99231 SBSQ HOSP IP/OBS SF/LOW 25: CPT | Performed by: INTERNAL MEDICINE

## 2019-05-06 PROCEDURE — 2500000003 HC RX 250 WO HCPCS: Performed by: SURGERY

## 2019-05-06 PROCEDURE — 86140 C-REACTIVE PROTEIN: CPT

## 2019-05-06 PROCEDURE — 83735 ASSAY OF MAGNESIUM: CPT

## 2019-05-06 PROCEDURE — 99232 SBSQ HOSP IP/OBS MODERATE 35: CPT | Performed by: INTERNAL MEDICINE

## 2019-05-06 PROCEDURE — 85610 PROTHROMBIN TIME: CPT

## 2019-05-06 PROCEDURE — 85018 HEMOGLOBIN: CPT

## 2019-05-06 RX ADMIN — LEVALBUTEROL 1.25 MG: 1.25 SOLUTION, CONCENTRATE RESPIRATORY (INHALATION) at 07:11

## 2019-05-06 RX ADMIN — LORAZEPAM 0.5 MG: 2 INJECTION INTRAMUSCULAR; INTRAVENOUS at 21:33

## 2019-05-06 RX ADMIN — Medication 10 ML: at 21:33

## 2019-05-06 RX ADMIN — I.V. FAT EMULSION 100 ML: 20 EMULSION INTRAVENOUS at 20:34

## 2019-05-06 RX ADMIN — Medication 10 ML: at 09:51

## 2019-05-06 RX ADMIN — METOPROLOL TARTRATE 12.5 MG: 25 TABLET ORAL at 21:34

## 2019-05-06 RX ADMIN — CALCIUM GLUCONATE: 94 INJECTION, SOLUTION INTRAVENOUS at 20:29

## 2019-05-06 RX ADMIN — LEVALBUTEROL 1.25 MG: 1.25 SOLUTION, CONCENTRATE RESPIRATORY (INHALATION) at 14:40

## 2019-05-06 RX ADMIN — SODIUM CHLORIDE: 9 INJECTION, SOLUTION INTRAVENOUS at 12:19

## 2019-05-06 RX ADMIN — IPRATROPIUM BROMIDE 0.5 MG: 0.5 SOLUTION RESPIRATORY (INHALATION) at 10:55

## 2019-05-06 RX ADMIN — LEVALBUTEROL 1.25 MG: 1.25 SOLUTION, CONCENTRATE RESPIRATORY (INHALATION) at 10:57

## 2019-05-06 RX ADMIN — MORPHINE SULFATE 2 MG: 2 INJECTION, SOLUTION INTRAMUSCULAR; INTRAVENOUS at 09:47

## 2019-05-06 RX ADMIN — IPRATROPIUM BROMIDE 0.5 MG: 0.5 SOLUTION RESPIRATORY (INHALATION) at 07:11

## 2019-05-06 RX ADMIN — IPRATROPIUM BROMIDE 0.5 MG: 0.5 SOLUTION RESPIRATORY (INHALATION) at 14:41

## 2019-05-06 ASSESSMENT — ENCOUNTER SYMPTOMS
VOMITING: 0
NAUSEA: 0
ABDOMINAL PAIN: 0
SHORTNESS OF BREATH: 0
DIARRHEA: 0
CONSTIPATION: 0

## 2019-05-06 ASSESSMENT — PAIN SCALES - GENERAL: PAINLEVEL_OUTOF10: 10

## 2019-05-06 NOTE — ANESTHESIA PRE PROCEDURE
Department of Anesthesiology  Preprocedure Note       Name:  Libby Issa   Age:  77 y.o.  :  1950                                          MRN:  [id number]         Date:  2019      Surgeon: Mulu Flores):  Anton Henao MD    Procedure: ANAL PROCTO SIGMOIDOSCOPY FLEXIBLE (N/A )    Medications prior to admission:   Prior to Admission medications    Medication Sig Start Date End Date Taking? Authorizing Provider   oxyCODONE-acetaminophen (PERCOCET) 5-325 MG per tablet Take 1 tablet by mouth every 6 hours as needed for Pain. Yes Historical Provider, MD   senna-docusate (PERICOLACE) 8.6-50 MG per tablet Take 1 tablet by mouth 2 times daily   Yes Historical Provider, MD   budesonide-formoterol (SYMBICORT) 160-4.5 MCG/ACT AERO Inhale 2 puffs into the lungs 2 times daily   Yes Historical Provider, MD   sucralfate (CARAFATE) 1 GM/10ML suspension Take 1 g by mouth 4 times daily    Yes Historical Provider, MD   traZODone (DESYREL) 50 MG tablet Take 50 mg by mouth nightly   Yes Historical Provider, MD   tiZANidine (ZANAFLEX) 2 MG tablet Take 2 mg by mouth every 8 hours as needed (left knee)    Yes Historical Provider, MD   aspirin 81 MG tablet Take 81 mg by mouth daily   Yes Historical Provider, MD   clopidogrel (PLAVIX) 75 MG tablet TAKE 1 TABLET DAILY 3/31/15  Yes Barbara Holguin DO   DOCQLACE 100 MG capsule TAKE 1 CAPSULE BY MOUTH IN THE MORNING & IN THE EVENING -[address] [facility] [address] TAKING THIS MEDICINE 3/31/15  Yes Barbara Holguin DO   amLODIPine (NORVASC) 10 MG tablet Take 10 mg by mouth daily. Yes Historical Provider, MD   LORazepam (ATIVAN) 0.5 MG tablet Take 0.5 mg by mouth every 6 hours as needed for Anxiety. Yes Historical Provider, MD   therapeutic multivitamin-minerals (THERAGRAN-M) tablet Take 1 tablet by mouth daily. Yes Historical Provider, MD   omeprazole (PRILOSEC) 20 MG capsule Take 20 mg by mouth daily.    Yes Historical Provider, MD   venlafaxine (EFFEXOR) 37.5 MG tablet Take 37.5 Intravenous Continuous Pelon Ortiz MD 10 mL/hr at 05/06/19 1219      [MAR Hold] fat emulsion 20 % infusion 100 mL  100 mL Intravenous Daily Jason Perrin MD   Stopped at 05/06/19 0700    [MAR Hold] hydrOXYzine (ATARAX) tablet 50 mg  50 mg Oral 4x Daily PRN Thanh Francisco   50 mg at 04/27/19 0538    [MAR Hold] furosemide (LASIX) injection 40 mg  40 mg Intravenous Daily Ankit Peguero MD   40 mg at 05/05/19 0846    [MAR Hold] metoprolol (LOPRESSOR) injection 10 mg  10 mg Intravenous Q4H PRN Letitia Muñoz MD   10 mg at 04/27/19 0538    [MAR Hold] magnesium sulfate 1 g in dextrose 5% 100 mL IVPB  1 g Intravenous Once Jason Perrin MD        [facility]] ipratropium (ATROVENT) 0.02 % nebulizer solution 0.5 mg  0.5 mg Nebulization Q4H Hipolito Foley MD   0.5 mg at 05/06/19 1055    [MAR Hold] insulin lispro (HUMALOG) injection vial 0-12 Units  0-12 Units Subcutaneous Q6H Vinay Kenney MD   2 Units at 05/02/19 1712    [MAR Hold] levalbuterol (Mennie Boots) nebulizer solution 1.25 mg  1.25 mg Nebulization Q4H Hipolito Foley MD   1.25 mg at 05/06/19 1057    [MAR Hold] sodium chloride nebulizer 0.9 % solution 3 mL  3 mL Nebulization Q8H PRN Vinay Kenney MD        [facility] Hold] fentaNYL (SUBLIMAZE) injection 50 mcg  50 mcg Intravenous Q2H PRN Vinay Kenney MD   50 mcg at 05/05/19 1902    [MAR Hold] oxyCODONE-acetaminophen (PERCOCET) 5-325 MG per tablet 1 tablet  1 tablet Oral Q4H PRN Vinay Kenney MD   1 tablet at 05/05/19 1312    [MAR Hold] magnesium sulfate 1 g in dextrose 5% 100 mL IVPB  1 g Intravenous PRN Vinay Kenney MD   Stopped at 04/17/19 0857    [MAR Hold] sodium phosphate 7.41 mmol in dextrose 5 % 250 mL IVPB  0.16 mmol/kg Intravenous PRN Vinay Kenney MD   Stopped at 04/17/19 2008    Or    [MAR Hold] sodium phosphate 14.85 mmol in dextrose 5 % 250 mL IVPB  0.32 mmol/kg Intravenous PRLAURA Kenney MD   Stopped at 04/17/19 1231    [MAR Hold] potassium chloride (KLOR-CON M) extended release tablet 40 mEq  40 mEq Oral PRN Anna Dietrich MD   40 mEq at 05/03/19 1027    Or    [MAR Hold] potassium bicarb-citric acid (EFFER-K) effervescent tablet 40 mEq  40 mEq Oral PRN Anna Dietrich MD   40 mEq at 04/21/19 0703    Or    [MAR Hold] potassium chloride 10 mEq/100 mL IVPB (Peripheral Line)  10 mEq Intravenous PRN Anna Dietrich  mL/hr at 04/16/19 1227 10 mEq at 04/16/19 1227    [MAR Hold] glucose (GLUTOSE) 40 % oral gel 15 g  15 g Oral PRN Anna Dietrich MD        [facility] Hold] dextrose 50 % solution 12.5 g  12.5 g Intravenous PRN Anna Dietrich MD        [MAR Hold] glucagon (rDNA) injection 1 mg  1 mg Intramuscular PRN Anna Dietrich MD        [facility] Hold] dextrose 5 % solution  100 mL/hr Intravenous PRN Anna Dietrich MD        [facility] Hold] milk and molasses enema 240 mL  240 mL Rectal Daily PRN Anna Dietrich MD   240 mL at 04/12/19 1745    [MAR Hold] venlafaxine (EFFEXOR) tablet 37.5 mg  37.5 mg Oral TID Anna Dietrich MD   37.5 mg at 05/05/19 1313    [MAR Hold] aspirin EC tablet 81 mg  81 mg Oral Daily Hipolito Foley MD   81 mg at 05/05/19 0857    [MAR Hold] sodium chloride flush 0.9 % injection 10 mL  10 mL Intravenous 2 times per day Anna Dietrich MD   10 mL at 05/06/19 0951    [MAR Hold] sodium chloride flush 0.9 % injection 10 mL  10 mL Intravenous PRN Anna Dietrich MD   10 mL at 04/20/19 1623    [MAR Hold] acetaminophen (TYLENOL) tablet 650 mg  650 mg Oral Q4H PRN Anna Dietrich MD   650 mg at 04/27/19 0530       Allergies:     Allergies   Allergen Reactions    Penicillins Shortness Of Breath and Rash    Amoxicillin        Problem List:    Patient Active Problem List   Diagnosis Code    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    CVA (cerebral vascular accident) ([facility] [address] I63.9    Abnormal computed tomography of cervical spine R93.7    Weight loss R63.4    Functional 132/61  (!) 108/59    Pulse: 109  106    Resp: 20  20    Temp: 98.4 °F (36.9 °C)  98.1 °F (36.7 °C)    TempSrc: Oral  Oral    SpO2:  96% 94% 94%   Weight:       Height:                                                  BP Readings from Last 3 Encounters:   05/06/19 (!) 108/59   04/02/19 129/81   03/30/19 120/69       NPO Status: Time of last liquid consumption: 2100                        Time of last solid consumption: 2100                        Date of last liquid consumption: 05/05/19                        Date of last solid food consumption: 05/05/19    BMI:   Wt Readings from Last 3 Encounters:   05/04/19 111 lb 8.8 oz (50.6 kg)   03/30/19 89 lb (40.4 kg)   03/28/19 94 lb 1.6 oz (42.7 kg)     Body mass index is 21.79 kg/m².     CBC:   Lab Results   Component Value Date    WBC 9.4 05/06/2019    RBC 2.49 05/06/2019    RBC 3.66 05/23/2012    HGB 7.1 05/06/2019    HCT 21.4 05/06/2019    MCV 87.3 05/06/2019    RDW 16.8 05/06/2019     05/06/2019     05/23/2012       CMP:   Lab Results   Component Value Date     05/06/2019    K 4.2 05/06/2019    CL 97 05/06/2019    CO2 27 05/06/2019    BUN 21 05/06/2019    CREATININE <0.40 05/06/2019    GFRAA CANNOT BE CALCULATED 05/06/2019    LABGLOM CANNOT BE CALCULATED 05/06/2019    GLUCOSE 115 05/06/2019    GLUCOSE 87 05/21/2012    PROT 4.7 05/06/2019    CALCIUM 7.5 05/06/2019    BILITOT 0.31 05/06/2019    ALKPHOS 161 05/06/2019    AST 15 05/06/2019    ALT 15 05/06/2019       POC Tests:   Recent Labs     05/06/19  1150   POCGLU 108*       Coags:   Lab Results   Component Value Date    PROTIME 15.5 05/06/2019    PROTIME 10.9 03/28/2012    INR 1.2 05/06/2019    APTT 27.7 03/28/2012       HCG (If Applicable): No results found for: PREGTESTUR, PREGSERUM, HCG, HCGQUANT     ABGs:   Lab Results   Component Value Date    PHART 7.519 04/19/2019    PO2ART 73.3 04/19/2019    MTN8KRT 42.5 04/19/2019    BFR9ZGR 34.6 04/19/2019    O3GFMEMT 94.8 04/19/2019        Type & Screen (If Applicable):  No results found for: [facility]    Anesthesia Evaluation  Patient summary reviewed and Nursing notes reviewed no history of anesthetic complications:   Airway: Mallampati: II  TM distance: >3 FB   Neck ROM: full  Mouth opening: > = 3 FB Dental:          Pulmonary:normal exam  breath sounds clear to auscultation  (+) pneumonia:  COPD:                             Cardiovascular:    (+) hypertension:,         Rhythm: regular  Rate: normal                    Neuro/Psych:   (+) CVA:,             GI/Hepatic/Renal:   (+) GERD:,           Endo/Other:                     Abdominal:           Vascular:                                        Anesthesia Plan      MAC     ASA 3       Induction: intravenous. MIPS: Prophylactic antiemetics administered. Anesthetic plan and risks discussed with patient. Plan discussed with CRNA.                   Mundo Manley MD   5/6/2019

## 2019-05-06 NOTE — PROGRESS NOTES
General Surgery Progress Note            PATIENT NAME: Mo Gonsalez     TODAY'S DATE: 5/6/2019, 11:59 AM      SUBJECTIVE / OBJECTIVE:      VITALS:  BP (!) 108/59   Pulse 106   Temp 98.1 °F (36.7 °C) (Oral)   Resp 20   Ht 5' (1.524 m)   Wt 111 lb 8.8 oz (50.6 kg)   SpO2 94%   BMI 21.79 kg/m²     Patient seen and examined  Continue to have abdominal pain  Abdomen tender in epigastrium, ND  On TPN    INTAKE/OUTPUT:      Intake/Output Summary (Last 24 hours) at 5/6/2019 1159  Last data filed at 5/5/2019 2133  Gross per 24 hour   Intake --   Output 125 ml   Net -125 ml       CBC with Differential:    Lab Results   Component Value Date    WBC 9.4 05/06/2019    RBC 2.49 05/06/2019    RBC 3.66 05/23/2012    HGB 7.2 05/06/2019    HCT 21.7 05/06/2019     05/06/2019     05/23/2012    MCV 87.3 05/06/2019    MCH 29.0 05/06/2019    MCHC 33.3 05/06/2019    RDW 16.8 05/06/2019    METASPCT 5 05/06/2019    LYMPHOPCT 7 05/06/2019    MONOPCT 4 05/06/2019    MYELOPCT 5 05/06/2019    BASOPCT 0 05/06/2019    MONOSABS 0.38 05/06/2019    LYMPHSABS 0.66 05/06/2019    EOSABS 0.09 05/06/2019    BASOSABS 0.00 05/06/2019    DIFFTYPE NOT REPORTED 05/06/2019     CMP:    Lab Results   Component Value Date     05/06/2019    K 4.2 05/06/2019    CL 97 05/06/2019    CO2 27 05/06/2019    BUN 21 05/06/2019    CREATININE <0.40 05/06/2019    GFRAA CANNOT BE CALCULATED 05/06/2019    LABGLOM CANNOT BE CALCULATED 05/06/2019    GLUCOSE 115 05/06/2019    GLUCOSE 87 05/21/2012    PROT 4.7 05/06/2019    LABALBU 1.8 05/06/2019    LABALBU 4.6 05/17/2012    CALCIUM 7.5 05/06/2019    BILITOT 0.31 05/06/2019    ALKPHOS 161 05/06/2019    AST 15 05/06/2019    ALT 15 05/06/2019     CT scan reviewed.  Shows internal hernia, which explains patient's intermittent pain and distension    ASSESSMENT / PLAN:     Principal Problem:    GI bleed  Active Problems:    Sepsis due to urinary tract infection (HCC)    Cystitis    Emphysematous cystitis

## 2019-05-06 NOTE — PROGRESS NOTES
Physical Therapy  DATE: 2019    NAME: Yoselin Ayala  MRN: [id number]   : 1950    Patient not seen this date for Physical Therapy due to:  [] Blood transfusion in progress  [] Hemodialysis  []  Patient Declined  [] Spine Precautions   [] Strict Bedrest  [] Surgery/ Procedure  [] Testing      [x] Other  Pt is out of room for sigmoidoscopy and NGT placement           [] PT being discontinued at this time. Patient independent. No further needs. [] PT being discontinued at this time as the patient has been transferred to palliative care. No further needs.     Sobia Craig, PTA

## 2019-05-06 NOTE — PROGRESS NOTES
[address]   OCCUPATIONAL THERAPY MISSED TREATMENT NOTE   INPATIENT   Date: 19  Patient Name: Alverto Stark       Room:   MRN: [id number]   Account #: [de-identified]    : 1950  (77 y.o.)  Gender: female   Referring Practitioner: Joby Salgado MD  Diagnosis: Sepsis due to UTI             REASON FOR MISSED TREATMENT:  Hold treatment per nursing request   -   Hold treatment per Louisa Anderson RN \"just let pt rest at this time\"         Jamaal POWELL/KORINA  This patient  Alverto Stark  was seen by the Occupational Therapy Student identified above, including any treatment and documentation activity.   The above documentation completed by KORINA Student  was reviewed and accepted by the Supervising Clinical Instructor   Electronically signed by Kena CONSTANTINO on 19 at 2:50 PM  .

## 2019-05-06 NOTE — PROGRESS NOTES
Pt seen and treated per report by Dr Komal Chilel    Pt has failed to fu    Per dr Komal Chilel cast can be removed      PE stable cast left leg    X-ray - severe osteopenia healed tibia fracture ?  Supracondylar femur fracture 6 weeks ago    Discontinue cast    X-ray

## 2019-05-06 NOTE — PROGRESS NOTES
Attempted study at 12:15PM on 05/06/2019, patient was down for colonoscopy. Attempted a second time at 6:32PM on 05/06/2019. Patient asked that test be done on following day, said she was not up for any more tests today.

## 2019-05-06 NOTE — PROGRESS NOTES
RRR  Abdomen: Soft, Nontender, Not distended, BS WNL cholecystostomy tube with bile drainage site with erythema  Skin: No jaundice, No clubbing, No cyanosis    Lab and Imaging Review     CBC  Recent Labs     05/04/19 0420 05/05/19 0619 05/05/19  2351 05/06/19 0527 05/06/19  1152   WBC 10.3  --  9.1  --   --  9.4  --    HGB 7.6*   < > 7.2*   < > 7.6* 7.2* 7.1*   HCT 23.2*   < > 21.7*   < > 23.3* 21.7* 21.4*   MCV 87.9  --  87.6  --   --  87.3  --      --  196  --   --  243  --     < > = values in this interval not displayed. BMP  Recent Labs     05/04/19 0420 05/05/19 0619 05/06/19  0527   * 131* 132*   K 4.4 4.5 4.2   CL 98 97* 97*   CO2 26 26 27   BUN 23 21 21   CREATININE <0.40* <0.40* <0.40*   GLUCOSE 102* 106* 115*   CALCIUM 7.6* 7.4* 7.5*       LFTS  Recent Labs     05/04/19 0420 05/05/19 0619 05/06/19 0527   ALKPHOS 117* 148* 161*   ALT 14 15 15   AST 18 18 15   PROT 4.6* 4.6* 4.7*   BILITOT 0.46 0.35 0.31   LABALBU 1.6* 1.6* 1.8*       PT/INR  Recent Labs     05/04/19  1215 05/05/19 0619 05/06/19 0527   PROTIME 15.1* 15.3* 15.5*   INR 1.2 1.2 1.2     TECHNIQUE:4/22/19   Informed consent was obtained after a detailed explanation of the procedure   including risks, benefits, and alternatives.  Universal protocol was   followed. A suitable skin site was prepped and draped in sterile fashion   following ultrasound localization.  An 18 gauge needle was advanced under   ultrasound guidance into the gallbladder via transhepatic route and a 0.035   guidewire was used to place a 8 [place] cholecystostomy tube under   fluoroscopic guidance.  The catheter was sutured to the skin and the patient   tolerated the procedure well.  Thick bilious material was sent for laboratory   analysis.  The catheter was attached to gravity drainage.       FINDINGS:   Ultrasound image demonstrates distended gallbladder.  Bilious fluid was sent   for culture.           Impression   Successful placement of free fluid in the pelvic cavity. No free air or focal fluid collection.  No abnormal lymph node.  Normal   abdominal aortic caliber.  Moderate calcific atherosclerosis.       Bones/Soft Tissues: No acute osseous abnormality.  Diffuse anasarca. Moderate degenerative changes in the lumbar spine.           Impression   1. Distended, contrast filled stomach with reflux of contrast into the   esophagus.  No enteric contrast is seen distal to the pylorus suggesting   delayed gastric emptying in the setting of ileus. 2. New moderate layering bilateral pleural effusions with bilateral lower   lobe atelectasis. 3. Small amount of nonspecific free fluid in the pelvic cavity. 4. Anasarca. 5. Cholelithiasis.        FINDINGS:GB US 4/19/19   Pancreas is not well visualized.       No liver lesion.       Gallbladder wall thickening.  Gallbladder sludge.  Cholelithiasis.    Pericholecystic fluid.       Common bile duct measures at the upper limits of normal at 7 mm.           Impression   Findings suggesting acute cholecystitis.      ESOPHAGOGASTRODUODENOSCOPY   ( EGD )  DATE OF PROCEDURE: 4/16/2019      Zoran Paulino MD        POSTOPERATIVE Sloan Prophet has esophagitis.     Has a large amount of retained solid food in the upper part of the stomach and fundus.  Antrum is clear.  Pylorus wide open and did not show signs of stenosis.     OPERATION: Upper GI endoscopy          Findings:     Retropharyngeal area was grossly normal appearing     Esophagus: abnormal: Has a grade 2 esophagitis.  Appears peptic esophagitis.  Has a small sliding hiatal hernia.     Stomach:  Fundus of the stomach and body of the stomach.  With solid food.  75% of the lumen is occupied with solid food.     Antrum: No retained food.  Able to advance the scope through the pylorus without difficulty.  No pyloric stenosis seen.  No signs of outlet obstruction.     Duodenum:     Descending: normal    Bulb: normal  Minimal liquid

## 2019-05-06 NOTE — PROGRESS NOTES
Dr Rafael Ford in preop to speak with patient. Patient states does not want procedure and wants to return to room. Dr Rafael Ford cancelled procedure. Patient awaiting transport back to room.

## 2019-05-06 NOTE — PROGRESS NOTES
[address] [facility]    PROGRESS NOTE             5/6/2019    8:28 AM    Name:   Clark Ledesma  MRN:     [id number]     Acct:      [de-identified]   Room:   [address] [place] [address] Day:  25  Admit Date:  4/11/2019  2:48 PM    PCP:  Maycol Haynes DO  Code Status:  Full Code    Subjective:     C/C:   Chief Complaint   Patient presents with    Abdominal Pain     Interval History Status: not changed. Patient seen bedside on progressive. No acute events overnight. VSS. Tachycardic low 100's. Slight increase in alk phos 148->161. CT abdo/pelvis yesterday: bowel obstruction likely due to internal hernia. Per GI: plan for sigmoidoscopy today. Pt refused colon prep for colonoscopy. Brief History:     The patient is a 68 y.o.  presents sepsis secondary to acute cystitis.     Review of Systems:     Review of Systems   Constitutional: Positive for fatigue. Negative for chills and fever. Eyes: Negative for visual disturbance. Respiratory: Negative for shortness of breath. Cardiovascular: Negative for chest pain, palpitations and leg swelling. Gastrointestinal: Negative for abdominal pain, constipation, diarrhea, nausea and vomiting. Genitourinary: Negative for dysuria and frequency. Musculoskeletal: Negative for myalgias. Neurological: Positive for weakness. Negative for light-headedness and headaches. Medications: Allergies:     Allergies   Allergen Reactions    Penicillins Shortness Of Breath and Rash    Amoxicillin        Current Meds:   Scheduled Meds:    sodium chloride  250 mL Intravenous Once    sodium chloride  250 mL Intravenous Once    predniSONE  10 mg Oral Daily    mometasone-formoterol  2 puff Inhalation BID    pantoprazole  40 mg Oral QAM AC    metoprolol tartrate  12.5 mg Oral BID    fat emulsion  100 mL Intravenous Daily    furosemide  40 mg Intravenous Daily    magnesium sulfate  1 g Intravenous Once    ipratropium  0.5 mg Nebulization Q4H    insulin lispro  0-12 Units Subcutaneous Q6H    levalbuterol  1.25 mg Nebulization Q4H    venlafaxine  37.5 mg Oral TID    aspirin  81 mg Oral Daily    sodium chloride flush  10 mL Intravenous 2 times per day     Continuous Infusions:    PN-Adult 2-in-1 Central Line (Standard) 65 mL/hr at 19 1857    sodium chloride 10 mL/hr at 19 0029    dextrose       PRN Meds: sodium chloride flush, morphine, LORazepam, ondansetron, zolpidem, sodium chloride flush, hydrOXYzine, metoprolol, sodium chloride nebulizer, fentanNYL, oxyCODONE-acetaminophen, magnesium sulfate, sodium phosphate IVPB **OR** sodium phosphate IVPB, potassium chloride **OR** potassium alternative oral replacement **OR** potassium chloride, glucose, dextrose, glucagon (rDNA), dextrose, milk and molasses, sodium chloride flush, acetaminophen    Data:     Past Medical History:   has a past medical history of Abnormal computed tomography of cervical spine, CVA (cerebral vascular accident) ([facility] [address] GERD (gastroesophageal reflux disease), Hypertension, Paraproteinemia, and Weight loss. Social History:   reports that she has been smoking. She has a 30.00 pack-year smoking history. She has never used smokeless tobacco. She reports that she drank about 16.8 oz of alcohol per week. She reports that she does not use drugs. Family History: History reviewed. No pertinent family history. Vitals:  BP (!) 108/59   Pulse 106   Temp 98.1 °F (36.7 °C) (Oral)   Resp 20   Ht 5' (1.524 m)   Wt 111 lb 8.8 oz (50.6 kg)   SpO2 94%   BMI 21.79 kg/m²   Temp (24hrs), Av.1 °F (36.7 °C), Min:97.6 °F (36.4 °C), Max:98.4 °F (36.9 °C)    Recent Labs     19  1618 19  0107 19  0613 19  0736   POCGLU 121* 95 109* 115*       I/O(24Hr):     Intake/Output Summary (Last 24 hours) at 2019 0828  Last data filed at 2019 2133  Gross per 24 hour   Intake --   Output 925 ml   Net -925 ml Labs:    Lab Results   Component Value Date    WBC 9.4 05/06/2019    HGB 7.2 (L) 05/06/2019    HCT 21.7 (L) 05/06/2019    MCV 87.3 05/06/2019     05/06/2019     Lab Results   Component Value Date     (L) 05/06/2019    K 4.2 05/06/2019    CL 97 (L) 05/06/2019    CO2 27 05/06/2019    BUN 21 05/06/2019    CREATININE <0.40 (L) 05/06/2019    GLUCOSE 115 (H) 05/06/2019    CALCIUM 7.5 (L) 05/06/2019    PROT 4.7 (L) 05/06/2019    LABALBU 1.8 (L) 05/06/2019    BILITOT 0.31 05/06/2019    ALKPHOS 161 (H) 05/06/2019    AST 15 05/06/2019    ALT 15 05/06/2019    LABGLOM CANNOT BE CALCULATED 05/06/2019    GFRAA CANNOT BE CALCULATED 05/06/2019    GLOB NOT REPORTED 04/18/2019           Lab Results   Component Value Date/Time    SPECIAL NOT REPORTED 04/23/2019 10:10 PM     Lab Results   Component Value Date/Time    CULTURE (A) 04/22/2019 04:59 PM     YEAST, NOT CANDIDA ALBICANS OR CANDIDA DUBLINIENSIS SCANT GROWTH    CULTURE (A) 04/22/2019 04:59 PM     KLEBSIELLA PNEUMONIAE ONE COLONY FORMING UNIT THIS ORGANISM IS AN EXTENDED-SPECTRUM BETA-LACTAMASE  AND RESISTANCE TO THERAPY WITH PENICILLINS, CEPHALOSPORINS AND AZTREONAM IS EXPECTED. THESE ORGANISMS GENERALLY REMAIN SUSCEPTIBLE TO CARBAPENEMS. CONSIDER ID CONSULTATION. CULTURE NO ANAEROBIC ORGANISMS ISOLATED AT 5 DAYS (A) 04/22/2019 04:59 PM       [facility]    Radiology:    Ct Abdomen Pelvis Wo Contrast Additional Contrast? Oral    Result Date: 4/14/2019  EXAMINATION: CT OF THE ABDOMEN AND PELVIS WITHOUT CONTRAST 4/14/2019 7:34 pm TECHNIQUE: CT of the abdomen and pelvis was performed without the administration of intravenous contrast. Multiplanar reformatted images are provided for review. Dose modulation, iterative reconstruction, and/or weight based adjustment of the mA/kV was utilized to reduce the radiation dose to as low as reasonably achievable.  COMPARISON: 04/11/2019 HISTORY: ORDERING SYSTEM PROVIDED HISTORY: ABDOMINAL PAIN TECHNOLOGIST PROVIDED HISTORY: Water soluble contrast only please Ordering Physician Provided Reason for Exam: Abdominal pain - Vented patient. Contrast given via nurse through NG tube. Acuity: Unknown Type of Exam: Unknown Relevant Medical/Surgical History: Hx - Sepsis due to urinary tract infection. FINDINGS: Lower Chest: New moderate layering bilateral pleural effusions with bilateral lower lobe atelectasis. Organs: Limited evaluation due lack of intravenous contrast.  Cholelithiasis redemonstrated. No gallbladder wall thickening or biliary ductal dilatation. Scattered tiny hypodense lesions in the liver are too small to characterize but statistically represent benign cysts or hemangiomas and appear unchanged. The pancreas, spleen, adrenal glands, and kidneys are unremarkable. There is no hydronephrosis or urinary tract calculus. GI/Bowel: The stomach is distended. Enteric tube is in place. No contrast is seen distal to the pylorus and there is contrast reflux into the distal esophagus. There is no evidence of bowel obstruction. The appendix is not definitely visualized. No focal pericecal inflammatory changes are evident. Pelvis: The urinary bladder is decompressed by Tran catheter. No pelvic mass is seen. Peritoneum/Retroperitoneum: Small amount of free fluid in the pelvic cavity. No free air or focal fluid collection. No abnormal lymph node. Normal abdominal aortic caliber. Moderate calcific atherosclerosis. Bones/Soft Tissues: No acute osseous abnormality. Diffuse anasarca. Moderate degenerative changes in the lumbar spine. 1. Distended, contrast filled stomach with reflux of contrast into the esophagus. No enteric contrast is seen distal to the pylorus suggesting delayed gastric emptying in the setting of ileus. 2. New moderate layering bilateral pleural effusions with bilateral lower lobe atelectasis. 3. Small amount of nonspecific free fluid in the pelvic cavity. 4. Anasarca. 5. Cholelithiasis.      Neumann Stage Chest (single View Frontal)    Result Date: 5/2/2019  EXAMINATION: SINGLE XRAY VIEW OF THE CHEST 5/2/2019 6:34 am COMPARISON: 29 April 2019 HISTORY: ORDERING SYSTEM PROVIDED HISTORY: COPD TECHNOLOGIST PROVIDED HISTORY: COPD Ordering Physician Provided Reason for Exam: COPD Acuity: Acute Type of Exam: Initial FINDINGS: AP portable view of the chest time stamped at 630 hours demonstrates stable cardiac size. Overlying cardiac monitoring electrodes are present. Right-sided PICC line terminates in the right atrium. Bipolar pacemaker enters from the left with intact leads in appropriate positions. There is been no significant interval change in bilateral interstitial opacities, representing interval change since 2015. This may be related to worsening interstitial disease or superimposed venous congestion. Bilateral effusions are noted with bibasilar opacities, either atelectasis or edema. Continued bilateral effusions, bibasilar opacities and bilateral interstitial opacities in the upper lobes. Findings may be related to worsening interstitial disease and edema. Airspace disease is not excluded. Xr Chest (single View Frontal)    Result Date: 4/13/2019  EXAMINATION: SINGLE XRAY VIEW OF THE CHEST 4/13/2019 7:18 am COMPARISON: April 12, 2019 HISTORY: ORDERING SYSTEM PROVIDED HISTORY: dyspnea TECHNOLOGIST PROVIDED HISTORY: dyspnea Ordering Physician Provided Reason for Exam: dyspnea Acuity: Acute Type of Exam: Subsequent/Follow-up Additional signs and symptoms: dyspnea FINDINGS: A right IJ catheter is seen with its tip terminating at the superior cavoatrial junction. The left chest wall pacemaker and leads are stable. The cardiomediastinal silhouette is stable. There is interval increased opacity at the right lung base, may be related to atelectasis versus pneumonia. There is small atelectasis and mild pleural effusion at the left lung base. There is no pneumothorax. There is no acute osseous abnormality. Interval increased opacity at the right lung base, may be related to atelectasis versus pneumonia. Small atelectasis and mild pleural effusion at the left lung, new since the prior study. Xr Chest Standard (2 Vw)    Result Date: 4/29/2019  EXAMINATION: TWO VIEWS OF THE CHEST 4/29/2019 8:04 pm COMPARISON: 04/27/2019 HISTORY: ORDERING SYSTEM PROVIDED HISTORY: Follow-up lung infiltrate TECHNOLOGIST PROVIDED HISTORY: Follow-up lung infiltrate Ordering Physician Provided Reason for Exam: Follow-up infiltrate. Pt unable to lean forward - unable to get proper sponge behind pt for lateral. Pt unable to raise left arm at all for lateral. Best possible lateral obtained. Acuity: Unknown Type of Exam: Unknown Additional signs and symptoms: Follow-up infiltrate. Pt unable to lean forward - unable to get proper sponge behind pt for lateral. Pt unable to raise left arm at all for lateral. Best possible lateral obtained. FINDINGS: Left chest cardiac pacemaker device is in place. Right IJ central venous catheter in place with distal tip at the cavoatrial junction. Heart size is within normal limits. No vascular congestion. There are small to moderate pleural effusions. There are interstitial opacities in the upper lungs, which could be related to scarring and/or developing infiltrate, slightly improved in appearance when compared to 04/27/2019. No evidence of pneumothorax. 1.  Small to moderate bilateral pleural effusions, better visualized on lateral view. 2.  Upper lobe interstitial opacities, slightly improved when compared to the previous study, possibly related to underlying fibrotic change or residual infiltrate.      Xr Abdomen (kub) (single Ap View)    Result Date: 4/19/2019  EXAMINATION: SINGLE SUPINE XRAY VIEW(S) OF THE ABDOMEN 4/19/2019 9:48 am COMPARISON: April 14, 2019 HISTORY: ORDERING SYSTEM PROVIDED HISTORY: pain TECHNOLOGIST PROVIDED HISTORY: pain Ordering Physician Provided Reason for Exam: Abdominal pain and distentsion Acuity: Acute Type of Exam: Initial Additional signs and symptoms: Abdominal pain and distentsion FINDINGS: Enteric tube with the tip within the stomach. Small left pleural effusion. Minimal right pleural effusion. Mildly dilated loops of bowel. Nonspecific bowel gas pattern with mildly dilated loops of bowel. Xr Abdomen (kub) (single Ap View)    Result Date: 4/14/2019  EXAMINATION: SINGLE SUPINE XRAY VIEW(S) OF THE ABDOMEN 4/14/2019 7:39 am COMPARISON: CT abdomen and pelvis  film from 11 April 2019 HISTORY: [address]: Abd Distention TECHNOLOGIST PROVIDED HISTORY: Abd Distention Ordering Physician Provided Reason for Exam: Abdominal distention. Pt was moving around in the bed. Best films at present time. Acuity: Acute Type of Exam: Initial Additional signs and symptoms: Abdominal distention. Pt was moving around in the bed. Best films at present time. FINDINGS: Portable view time stamped at 748 hours demonstrates an intestinal tube terminating in the midportion of a gaseous Spike dilated stomach. Densities are present over the stomach likely medication. Bipolar pacemaker is in situ with intact leads. Heart size is top-normal, stable. Gaseous distension of the stomach and loop of bowel in the upper mid abdomen is noted but there is gas and fecal material in the rectum. Gastric outlet obstruction or proximal small bowel partial obstruction is suspected. No free air is noted. Midline city is present over the pelvis likely a monitor or CT small bore catheter. Vascular calcification is present in the pelvis. Persistent preferential gaseous distension of the stomach although there is some gas in the upper abdomen and gas and fecal material present in the rectosigmoid. Findings suggest gastric outlet obstruction.      Ct Abdomen W Contrast Additional Contrast? Radiologist Recommendation    Result Date: 5/5/2019  EXAMINATION: CT ABDOMEN WITH HISTORY: GI BLEEDING TECHNOLOGIST PROVIDED HISTORY: Ordering Physician Provided Reason for Exam: GI bleeding Acuity: Unknown Type of Exam: Unknown FINDINGS: Activity is seen within the blood pool, including the great vessels, heart, liver, and spleen. There was no abnormal accumulation of radioactivity in the abdomen to suggest active bleeding during study acquisition. No evidence of active GI bleeding during acquisition. RECOMMENDATIONS: If the patient shows hemodynamic signs of an active bleed in the next 20 hours, additional images can be acquired. Ct Abdomen Pelvis W Iv Contrast    Result Date: 4/11/2019  EXAMINATION: CT OF THE ABDOMEN AND PELVIS WITH CONTRAST 4/11/2019 5:14 pm TECHNIQUE: CT of the abdomen and pelvis was performed with the administration of intravenous contrast. Multiplanar reformatted images are provided for review. Dose modulation, iterative reconstruction, and/or weight based adjustment of the mA/kV was utilized to reduce the radiation dose to as low as reasonably achievable. COMPARISON: None. HISTORY: ORDERING SYSTEM PROVIDED HISTORY: Abdominal pain TECHNOLOGIST PROVIDED HISTORY: IV Only Contrast Ordering Physician Provided Reason for Exam: patient c/o abd pain for an hour FINDINGS: Lower Chest: Trace pleural fluid bilaterally is noted. Indwelling cardiac pacemaker is present. Heart size is normal.  Coronary artery calcifications are evident. The esophagus is significantly dilated and fluid-filled. No paraesophageal adenopathy is evident. Organs: The spleen, pancreas, and adrenals are unremarkable. The liver contains hypodensities, likely cysts. The gallbladder is distended and contains a solitary gallstone. The kidneys excrete contrast bilaterally. Extrarenal collecting systems are noted. The ureters are mildly dilated down to the urinary bladder without evidence of intraluminal or ureteral obstructing calculi.  GI/Bowel: Marked distention of the stomach is noted; this atrial junction. Pacer wires are in place. The heart and mediastinal structures are stable. Pleural effusions are present with bibasilar atelectasis. Bilateral lung infiltrates are present in the upper lung fields. Persistent pleural effusions with bibasilar atelectasis and bilateral lung infiltrates with areas of consolidation developing in the upper lobes. Xr Chest Portable    Result Date: 4/26/2019  EXAMINATION: SINGLE XRAY VIEW OF THE CHEST 4/26/2019 6:51 am COMPARISON: April 24, 2019 HISTORY: ORDERING SYSTEM PROVIDED HISTORY: Pleural effusions TECHNOLOGIST PROVIDED HISTORY: Pleural effusions Ordering Physician Provided Reason for Exam: pleural effusion Acuity: Acute Type of Exam: Initial FINDINGS: Right IJ line in good position. Pacer device is stable. Cardiac leads overlie the chest.  Stable cardiomediastinal contours with calcified aortic arch. Left effusion and associated airspace disease, slightly decreased. Chronic blunting of right costophrenic sulcus may represent scarring or chronic effusion. Increased reticular markings right upper chest and left upper chest possibly interstitial edema or interstitial pneumonia. No new airspace consolidation. Increasing reticular markings upper chest bilaterally right greater than left possibly due to edema or interstitial pneumonitis. Improving left effusion with associated retrocardiac airspace disease. Pleural-parenchymal scarring or effusion in the right lung base, stable. Xr Chest Portable    Result Date: 4/24/2019  EXAMINATION: SINGLE XRAY VIEW OF THE CHEST 4/24/2019 6:05 am COMPARISON: Chest radiograph dated 04/22/2019 HISTORY: ORDERING SYSTEM PROVIDED HISTORY: Acute respiratory failure, pleural effusion TECHNOLOGIST PROVIDED HISTORY: Acute respiratory failure, pleural effusion Ordering Physician Provided Reason for Exam: pleural effusion, respiratory failure.  Acuity: Acute Type of Exam: Initial FINDINGS: Heart size is normal. Dual-chamber pacemaker placed via left subclavian approach. Right internal jugular central line has tip in distal superior vena cava. Interval removal of nasogastric tube. No interval change of infiltrate and atelectasis at the left lung base. Stable mild infiltrate in the left upper lobe. Mild interval improvement of infiltrate at the right lung base. Stable small bilateral pleural effusions, left larger than right. Mild CHF, stable. 1.  No interval change of infiltrate and atelectasis at the left lung base. Stable mild infiltrate in the left upper lobe. 2.  Mild interval improvement of infiltrate at the right lung base. 3.  Stable small bilateral pleural effusions, left larger than right. 4.  Mild CHF, stable. Xr Chest Portable    Result Date: 4/22/2019  EXAMINATION: SINGLE XRAY VIEW OF THE CHEST 4/22/2019 6:44 am COMPARISON: April 21, 2019. HISTORY: ORDERING SYSTEM PROVIDED HISTORY: Hypoxia and CHF TECHNOLOGIST PROVIDED HISTORY: Hypoxia and CHF Ordering Physician Provided Reason for Exam: hx of hypoxia/CHF Acuity: Acute Type of Exam: Initial FINDINGS: Right IJ central venous catheter appears in unchanged position. Nasogastric tube courses below the diaphragm, with tip not imaged. Left chest wall pacemaker device projects in unchanged position. Cardiac and mediastinal contours are enlarged but unchanged. Unchanged bilateral pleural effusions and bibasilar pulmonary opacities. Unchanged findings suggestive of congestive heart failure. No evidence of pneumothorax. No new osseous abnormalities. 1. No significant change in findings suggestive of congestive heart failure. 2. Unchanged bilateral pleural effusions and bibasilar pulmonary consolidations. RECOMMENDATION: Radiographic follow-up to complete resolution.      Xr Chest Portable    Result Date: 4/21/2019  EXAMINATION: SINGLE XRAY VIEW OF THE CHEST 4/21/2019 3:08 pm COMPARISON: April 19, 2019 HISTORY: ORDERING SYSTEM PROVIDED HISTORY: hypoxia TECHNOLOGIST PROVIDED HISTORY: hypoxia Ordering Physician Provided Reason for Exam: hypoxia Acuity: Unknown Type of Exam: Unknown FINDINGS: Enteric tube in the stomach. ET tube is been removed. Right IJ catheter terminates in the atrial caval junction. Bipolar pacer on the left unchanged. Heart and mediastinum unremarkable. Moderate edema unchanged. Small effusions, left greater than right. Bony thorax intact. Status post extubation. Life support apparatus otherwise stable. Moderate edema and effusions unchanged. Xr Chest Portable    Result Date: 4/19/2019  EXAMINATION: SINGLE XRAY VIEW OF THE CHEST 4/19/2019 5:51 am COMPARISON: April 18, 2019 HISTORY: ORDERING SYSTEM PROVIDED HISTORY: ETT placement TECHNOLOGIST PROVIDED HISTORY: ETT placement Ordering Physician Provided Reason for Exam: Vent Pt check ETT placement Acuity: Acute Type of Exam: Subsequent/Follow-up Additional signs and symptoms: Vent Pt check ETT placement FINDINGS: Tubes and lines are unchanged. Persistent bibasilar lung opacities and pleural effusions. Mild pulmonary edema. No significant interval change. Xr Chest Portable    Result Date: 4/18/2019  EXAMINATION: SINGLE XRAY VIEW OF THE CHEST 4/18/2019 6:21 am COMPARISON: April 17, 2019 HISTORY: ORDERING SYSTEM PROVIDED HISTORY: ETT placement TECHNOLOGIST PROVIDED HISTORY: ETT placement Ordering Physician Provided Reason for Exam: on vent Acuity: Acute Type of Exam: Initial FINDINGS: Right IJ line and NG tube are stable. Interval advancement of ET tube terminating 3.1 cm from ron. Implanted cardiac device is present. Left-sided effusion and associated airspace disease is stable. Hazy right basilar opacity possibly edema or atelectasis. No pneumothorax. Increased opacity left upper chest possibly focal area of atelectasis, new from prior. Advanced ETT from prior exam, now 3.1 cm from ron. Increased opacity left upper chest suspicious for atelectasis.  Additional supporting devices are stable. Xr Chest Portable    Result Date: 4/17/2019  EXAMINATION: SINGLE XRAY VIEW OF THE CHEST 4/17/2019 7:15 am COMPARISON: 16 April 2019 HISTORY: ORDERING SYSTEM PROVIDED HISTORY: ETT placement TECHNOLOGIST PROVIDED HISTORY: ETT placement Ordering Physician Provided Reason for Exam: on vent Acuity: Acute Type of Exam: Initial FINDINGS: AP portable view of the chest time stamped at 613 hours demonstrates overlying cardiac monitoring electrodes. A right internal jugular catheter terminates in the superior vena cava. Endotracheal tube is somewhat high riding terminating 6.4 cm above the ron. Intestinal tube extends beyond the body of the stomach, tip not included on the exam.  Bipolar pacemaker enters from the left with intact leads in appropriate positions. Heart size is normal.  Left pleural effusion is noted there is continued volume loss in the left hemithorax with stable mild vascular congestion, perihilar opacities, and faint opacity in the right mid and lower lung field. Underlying COPD is noted. Osseous structures are stable. There is little change from prior study. Findings of COPD, mild central vascular congestion, left effusion, and scattered opacities favoring interstitial edema with multifocal pneumonitis not excluded. Tubes and lines as above. However, endotracheal tube is high riding. The findings were sent to the Radiology Results [address] at 7:58 am on 4/17/2019to be communicated to a licensed caregiver. RECOMMENDATION: Suggest advancement of endotracheal tube.      Xr Chest Portable    Result Date: 4/16/2019  EXAMINATION: SINGLE XRAY VIEW OF THE CHEST 4/16/2019 6:54 am COMPARISON: 04/15/2019, 610 hours HISTORY: ORDERING SYSTEM PROVIDED HISTORY: ETT placement TECHNOLOGIST PROVIDED HISTORY: ETT placement Ordering Physician Provided Reason for Exam: on vent Acuity: Acute Type of Exam: Initial 57-year-old female on ventilator; check endotracheal tube placement FINDINGS: Portable AP upright view of the chest. Endotracheal tube distal tip overlying the mid trachea approximately 4.1 cm above the level of the ron. Enteric tube traverses the GE junction with distal tip excluded from the field of view. Left subclavian approach cardiac pacemaker device distal lead tips relatively stable in position. Right internal jugular approach central venous catheter distal tip overlying the high right atrium, stable. Cardiac monitor leads overlie the chest. Atherosclerotic calcification of the thoracic aorta. Slight stable volume loss of the left hemithorax. No pneumothorax. No free air. Dense retrocardiac/left basilar airspace consolidation and small left-sided pleural effusion. Stable mild focal opacity at the right mid lung zone. Underlying COPD. Stable mild pulmonary vascular congestion and left-sided predominant parahilar opacity. Visualized osseous structures remain unchanged. 1. Stable multifocal airspace disease as detailed above with dense retrocardiac/left basilar airspace consolidation and small left-sided pleural effusion. Mild pulmonary vascular congestion. Findings may represent edema or multifocal pneumonia. 2. Underlying COPD. 3. Tubes and line as detailed above. Xr Chest Portable    Result Date: 4/15/2019  EXAMINATION: SINGLE XRAY VIEW OF THE CHEST 4/15/2019 6:47 am COMPARISON: 14 April 2019 HISTORY: ORDERING SYSTEM PROVIDED HISTORY: ETT placement TECHNOLOGIST PROVIDED HISTORY: ETT placement Ordering Physician Provided Reason for Exam: on vent Acuity: Acute Type of Exam: Initial FINDINGS: AP portable view of the chest time stamped at 612 hours demonstrates overlying cardiac monitoring electrodes. Endotracheal tube terminates 4 cm above the ron. Bipolar pacemaker enters from the left with intact leads in appropriate positions. Intestinal tube extends beyond the fundus of the stomach, tip not included.   Right internal jugular catheter artifact from a skin fold. No pleural effusion or pneumothorax. Stable cardiomediastinal silhouette and great vessels with redemonstration of atherosclerotic thoracic aorta. New right IJ central venous catheter with tip near the superior atrial caval junction. No pneumothorax. No new consolidation. Vl Upper Extremity Bilateral Venous Duplex    Result Date: 4/22/2019    [facility]  Vascular Upper Extremities Veins Procedure   Patient Name   Angela Costa     Date of Study           04/22/2019                 Samantha Her   Date of Birth  1950  Gender                  Female   Age            77 year(s)  Race                       Room Number    2002        Height:                 59.84 inch, 152 cm   Corporate ID # [id number]    Weight:                 102 pounds, 46.3 kg   Patient Acct # [de-identified]   BSA:        1.4 m^2     BMI:       20.03 kg/m^2   MR #           [id number]      Sonographer             Roderick Mario   Accession #    [id number]   Interpreting Physician  Yvette Benz   Referring                  Referring Physician     Myla Kline *  Nurse  Practitioner  Procedure Type of Study:   Veins: Upper Extremities Veins, Venous Scan Upper Bilateral.  Indications for Study:Swelling. Patient Status: In Patient. Technical Quality:Limited visualization. Limitation reason:Line placements. Conclusions   Summary   No evidence of superficial or deep venous thrombosis in both upper  extremities.    Signature   ----------------------------------------------------------------  Electronically signed by Roderick Mario(Sonographer) on  04/22/2019 11:46 AM  ----------------------------------------------------------------   ----------------------------------------------------------------  Electronically signed by Zarina SilvaInterpreting  physician) on 04/22/2019 09:31 PM  ----------------------------------------------------------------  Findings:   Right Impression:                  Left Impression:  Internal jugular vein not          The internal jugular, subclavian,  visualized due to line placement. axillary, brachial, radial, and ulnar                                     veins demonstrate normal  Subclavian, axillary, brachial,    compressibility with phasic Doppler  radial, and ulnar veins            signals. demonstrate normal compressibility  with phasic Doppler signals. Normal compressibility of the cephalic                                     vein. Normal compressibility of the  cephalic vein. Normal compressibility of the basilic                                     vein. Normal compressibility of the  basilic vein. Velocities are measured in cm/s ; Diameters are measured in cm Right UE Vein Measurements 2D Measurements +------------------------------------+----------+---------------+----------+ ! Location                            ! Visualized! Compressibility! Thrombosis! +------------------------------------+----------+---------------+----------+ ! Prox SCV                            ! Yes       ! Yes            ! None      ! +------------------------------------+----------+---------------+----------+ ! Dist SCV                            ! Yes       ! Yes            ! None      ! +------------------------------------+----------+---------------+----------+ ! Prox Axillary                       ! Yes       ! Yes            ! None      ! +------------------------------------+----------+---------------+----------+ ! Dist Axillary                       ! Yes       ! Yes            ! None      ! +------------------------------------+----------+---------------+----------+ ! Prox Brachial                       !Yes       ! Yes            ! None      ! +------------------------------------+----------+---------------+----------+ ! Dist Brachial                       !Yes       ! Yes            ! None      ! +------------------------------------+----------+---------------+----------+ ! Prox +------------------------------------+----------+---------------+----------+ ! Dist Radial                         !Yes       ! Yes            ! None      ! +------------------------------------+----------+---------------+----------+ ! Prox Ulnar                          ! Yes       ! Yes            ! None      ! +------------------------------------+----------+---------------+----------+ ! Dist Ulnar                          ! Yes       ! Yes            ! None      ! +------------------------------------+----------+---------------+----------+ ! Basilic at UA                       ! Yes       ! Yes            ! None      ! +------------------------------------+----------+---------------+----------+ ! Cephalic at UA                      ! Yes       ! Yes            ! None      ! +------------------------------------+----------+---------------+----------+ ! Cephalic at AF                      ! Yes       ! Yes            ! None      ! +------------------------------------+----------+---------------+----------+ Doppler Measurements +-------------------------+-----------------------+------------------------+ ! Location                 ! Signal                 !Reflux                  ! +-------------------------+-----------------------+------------------------+ ! IJV                      ! Phasic                 !                        ! +-------------------------+-----------------------+------------------------+ ! SCV                      ! Phasic                 !                        ! +-------------------------+-----------------------+------------------------+ ! Axillary                 ! Phasic                 !                        ! +-------------------------+-----------------------+------------------------+ ! Brachial                 !Phasic                 !                        ! +-------------------------+-----------------------+------------------------+    Ir Cholecystostomy Percutaneous Complete    Result Date: 4/22/2019  PROCEDURE: ULTRASOUND and fluoroscopic GUIDED CHOLECYSTOSTOMY TUBE PLACEMENT April 22, 2019 HISTORY: ORDERING SYSTEM PROVIDED HISTORY: acute cholecystitis TECHNOLOGIST PROVIDED HISTORY: acute cholecystitis SEDATION: 75 mcg IV fentanyl for pain TECHNIQUE: Informed consent was obtained after a detailed explanation of the procedure including risks, benefits, and alternatives. Universal protocol was followed. A suitable skin site was prepped and draped in sterile fashion following ultrasound localization. An 18 gauge needle was advanced under ultrasound guidance into the gallbladder via transhepatic route and a 0.035 guidewire was used to place a 8 [place] cholecystostomy tube under fluoroscopic guidance. The catheter was sutured to the skin and the patient tolerated the procedure well. Thick bilious material was sent for laboratory analysis. The catheter was attached to gravity drainage. FINDINGS: Ultrasound image demonstrates distended gallbladder. Bilious fluid was sent for culture. Successful placement of transhepatic 8 Yi percutaneous cholecystostomy tube. Nm Hepatobiliary Scan W Ejection Fraction    Result Date: 4/20/2019  EXAMINATION: NUCLEAR MEDICINE HEPATOBILIARY SCINTIGRAPHY (HIDA SCAN). 4/20/2019 2:15 pm TECHNIQUE: Approximately 5.6 [id number] Mebrofenin (Choletec) was administered IV. Then, dynamic images of the abdomen were obtained in the anterior projection for 60 mins. A right lateral view was also obtained at 60 mins. Delayed images up to 4 hours were obtained. COMPARISON: Ultrasound 04/19/2019 HISTORY: ORDERING SYSTEM PROVIDED HISTORY: CHOLECYSTITIS TECHNOLOGIST PROVIDED HISTORY: Ordering Physician Provided Reason for Exam: Cholecystitis Acuity: Unknown Type of Exam: Unknown FINDINGS: Prompt, homogenous uptake by the liver is noted with normal appearance of radiotracer excretion into the biliary system. Clearance of bloodpool activity appears appropriate.  Normal small 7.2  Transfuse for hb <7.0  GI consulted - appreciate recs               Pt refusing colon prep - colonoscopy cancelled    Will opt for sigmoidoscopy, pre-op enema    Abdominal pain  CT abdo/pelvis: bowel obstruction likely due to internal hernie   Cont. To monitor   GI following     Sepsis secondary to e.coli, emphysematous cystitis - resolved   WBC wnl  Urology consulted - appreciate recs               -XMHYN dced (4/30)  Gen surg consulted - appreciate recs  ID consulted - appreciate recs    Cholecystotomy tube 4/22   Lasix continued - monitor urine output        Pneumonia +MRSA - Resolved   Pulm consulted - appreciate recs              -wean to prednisone 10mg daily      Sinus Tachycardia, improved   Stable  Hx of CBA, bradycardia with pacemaker   Lopressor 12.5 BID   Cont. To monitor      Acute cholecystitis   General surgery consulted - appreciate recs   Surgical intervention: cholecystomy tube 4/22    Cholecystectomy in patient when medically stable      Gastroparesis   Per GI: protonix  Reglan started on 4/17, daily EKG  Considering systemic autonomic dysfunction as overlying diagnosis (gastroparesis, neurogenic bladder, hypotension/fluctuating Bps)         Oral Candidiasis - improved     Anemia, most likely 2/2 bleeding   Transfused               1 unit 4/14              2 unit 4/16              1 unit 5/3     Thrombocytopenia - improved  Platelets 243  hold lovenox  Cont.  To monitor      Hypokalemia   Potassium sliding scale   4.2     Hypomagnesemia   Mg replacement   1.8     Hypophosphatemia   Sodium phosphate prn     Left knee remote distal tibia fx w/ osteopenia   Ortho to remove brace      Maintenance  Lovenox held   [place], Xopenex, Symbicort, Atrovent  Continue home meds trazodone, ASA, Plavix, Norvasc, Effexor, Spiriva     Dispo  PT/OT eval and treat   Social work dc planning            Alan Wen MD  5/6/2019  8:28 AM       IM Attending    Pt seen and examined today   I have discussed the

## 2019-05-06 NOTE — CARE COORDINATION
ONGOING DISCHARGE PLAN:    Spoke with patient regarding discharge plan and patient wants to go home with home care, [facility]. Patient is not eating, still on TPN, is unable to care for  herself per [facility]. Asked Patient is writer can call her son Freddy Hunter or Home care nurse lisa. Patient gave verbal permission to call them about caring for her at home. Writer tried calling son Freddy Hunter, his telephone number is  and left a voice mail message for him to either call Writer or Lit Moody RN about her living conditions for home and ask if he can care for herself or have 24 hour home care. Waiting for his call back. In the meantime, called Home Care aid lisa, [facility] advised that Patient just met her son Freddy Hunter 2 years ago as she gave him up for adoption at birth, Freddy Hunter found Gissell Kirkpatrick on his 50th birthday and really doesn't know her. [facility] added that Gissell Kirkpatrick can take care of her and he is getting for vacation and her flight leaves at 7:00p.m. She strongly advised patient can not go home. Writer spoke with Merrill Bonilla to see what can be done. Cecily checking with with residents to see if a psych consult is needed. Patient refused Flex sigmoid today, Still not eating, on TPN    LSW following    Will continue to follow for additional discharge needs.     Electronically signed by Beulah Jarrett RN on 5/6/2019 at 4:10 PM

## 2019-05-06 NOTE — CODE DOCUMENTATION
Resuscitation/Code Status Note on Sue Kehr (YOB: 1950)    At 15:30 on May 6, 2019, resuscitation/code status decision was based on a signed valid DNR Identification Form. The code status was made Limited Limited Code details: Intubation/Re-intubation No; Defibrillation/Cardioversion Yes; Chest Compressions Yes; Resuscitative Medications Yes; Other No Comment  .     Electronically signed by Charu Pretty MD on 5/6/19 at 3:27 PM

## 2019-05-06 NOTE — PROGRESS NOTES
Infectious disease Consult Note      Patient: Will Steiner  : 1950  Acct#:  [id number]     Date:  2019    Assessment:   Reported  drainage at cholecystectomy tube site   Anemia/GI bleed followed by GI  Cholecystitis status post cholecystectomy tube  grew Candida non-albicans and one colony of ESBL Klebsiella on culture . finished ABXS course      Ecoli Emphysematous cystitis treated     Aspiration pneumonia of right lower lobe treated    Sepsis /Septic shock     Yeast growth on sputum culture suspect contamination     Leukocytosis resolved    Acute hypoxic resp failure resolved    MRSA carrier    Urinary retention     Anemia      PCN allergy                 Recommendations:     Monitor off ABXS for now  Follow CBC , renal function closely . Plan for flex sig later today   Subjective:       History of Present Illness  Patient is a 77 y.o.  female admitted with Sepsis due to urinary tract infection   who is seen in consult for the same . She presented w dysuria and lower abd pain for around a week associated with vomiting ,was found hypotensive with leukocytosis . CT suggested emphysematous cystitis,possible gastric outlet obstruction. CXR showed a right lower infiltrate. High CA-19-9,CEA  Allergy to [facility] w SOB   Urine culture  grew ESCHERICHIA COLI resistant to Ampicillin ,sensitive to all other tested ABXS . Blood cultures negative . Mycoplasma IGM 0.97   YEAST MODERATE GROWTH on sputum culture   S/P EGD   with reported esophagitis. GB US Findings suggesting acute cholecystitis. HIIDA showed absent gallbladder filling. She was extubated on   Status post cholecystectomy tube  by interventional radiology,  cultures on  grew Candida non-albicans and one colony of ESBL Klebsiella. PICC line was placed   Tagged RBC scan  was negative . Interval history :  She is complaining of abdominal pain, nausea, no vomiting.   Less  drainage at cholecystectomy tube She is  still on TPN  HGB 7.2  No fever    WBC normal.  CT abdomen showed no fluid collection ,Bowel obstruction which is suspected to be caused by an internal hernia. Past Medical History:   Diagnosis Date    Abnormal computed tomography of cervical spine     sclerotic bone appearance     CVA (cerebral vascular accident) ([address]     left  side weakness    GERD (gastroesophageal reflux disease)     Hypertension     Paraproteinemia     Weight loss       Past Surgical History:   Procedure Laterality Date    INTUBATION  4/14/2019         PACEMAKER PLACEMENT  07/2011    Pacemaker is Medtronic Revo (compatible). Leads placed in 1995 are NOT MRI compatible. Placed at [facility] [address]. V's per Dr. Zuleima Reeves can not have an MRI.  UPPER GASTROINTESTINAL ENDOSCOPY N/A 4/16/2019    EGD ESOPHAGOGASTRODUODENOSCOPY @ BEDSIDE  ICU 2002 performed by Vinay Kenney MD at [id number] [facility]  No current facility-administered medications on file prior to encounter. Current Outpatient Medications on File Prior to Encounter   Medication Sig Dispense Refill    oxyCODONE-acetaminophen (PERCOCET) 5-325 MG per tablet Take 1 tablet by mouth every 6 hours as needed for Pain.       senna-docusate (PERICOLACE) 8.6-50 MG per tablet Take 1 tablet by mouth 2 times daily      budesonide-formoterol (SYMBICORT) 160-4.5 MCG/ACT AERO Inhale 2 puffs into the lungs 2 times daily      sucralfate (CARAFATE) 1 GM/10ML suspension Take 1 g by mouth 4 times daily       traZODone (DESYREL) 50 MG tablet Take 50 mg by mouth nightly      tiZANidine (ZANAFLEX) 2 MG tablet Take 2 mg by mouth every 8 hours as needed (left knee)       aspirin 81 MG tablet Take 81 mg by mouth daily      clopidogrel (PLAVIX) 75 MG tablet TAKE 1 TABLET DAILY 30 tablet 2    DOCQLACE 100 MG capsule TAKE 1 CAPSULE BY MOUTH IN THE MORNING & IN THE EVENING -DRINK PLENTYOF WATER WHILE TAKING THIS MEDICINE 30 capsule 1    amLODIPine (NORVASC) 10 MG tablet Take 10 mg by mouth daily.  LORazepam (ATIVAN) 0.5 MG tablet Take 0.5 mg by mouth every 6 hours as needed for Anxiety.  therapeutic multivitamin-minerals (THERAGRAN-M) tablet Take 1 tablet by mouth daily.  omeprazole (PRILOSEC) 20 MG capsule Take 20 mg by mouth daily.  venlafaxine (EFFEXOR) 37.5 MG tablet Take 37.5 mg by mouth 3 times daily.  Misc. Devices [facility]) [id number] [place] wheelchair with left fupper extremity support  Dx: stroke with left hemiparesis. 1 each 0           Allergies  Allergies   Allergen Reactions    Penicillins Shortness Of Breath and Rash    Amoxicillin         Social   Social History     Tobacco Use    Smoking status: Current Some Day Smoker     Packs/day: 1.00     Years: 30.00     Pack years: 30.00    Smokeless tobacco: Never Used   Substance Use Topics    Alcohol use: Not Currently     Alcohol/week: 16.8 oz     Types: 28 Glasses of wine per week                History reviewed. No pertinent family history. Review of Systems  Other than above 12 systems reviewed negative . Tolerating antibiotics. Physical Exam  BP (!) 108/59   Pulse 106   Temp 98.1 °F (36.7 °C) (Oral)   Resp 20   Ht 5' (1.524 m)   Wt 111 lb 8.8 oz (50.6 kg)   SpO2 94%   BMI 21.79 kg/m²           General Appearance: alert ,NAD . Skin: warm and dry, no rash or erythema  Head: normocephalic and atraumatic  Eyes: pupils equal, round  Neck: neck supple and non tender   Pulmonary/Chest: coarse to auscultation bilaterally- no wheezes, rales or rhonchi  Cardiovascular: normal rate, regular rhythm, normal S1 and S2, no murmurs.   Abdomen: soft,mild upper abdomen tenderness  -distended,  no masses or organomegaly, gallbladder drain with  bile drainage   Extremities: no cyanosis, clubbing   Edema   PICC line in place       Data Review:    Recent Labs     05/04/19  0420  05/05/19  0619  05/05/19  2351 05/06/19  0527 05/06/19  1152   WBC 10.3  --  9.1  --   --  9.4  --    HGB 7.6*   < > 7.2*   < > 7.6* 7.2* 7.1*   HCT 23.2*   < > 21.7*   < > 23.3* 21.7* 21.4*   MCV 87.9  --  87.6  --   --  87.3  --      --  196  --   --  243  --     < > = values in this interval not displayed. Recent Labs     05/04/19 0420 05/05/19 0619 05/06/19 0527   * 131* 132*   K 4.4 4.5 4.2   CL 98 97* 97*   CO2 26 26 27   PHOS  --   --  3.3   BUN 23 21 21   CREATININE <0.40* <0.40* <0.40*     Recent Labs     05/04/19 0420 05/05/19 0619 05/06/19 0527   AST 18 18 15   ALT 14 15 15   BILITOT 0.46 0.35 0.31   ALKPHOS 117* 148* 161*     No results for input(s): LIPASE, AMYLASE in the last 72 hours. Recent Labs     05/04/19  1215 05/05/19 0619 05/06/19 0527   PROTIME 15.1* 15.3* 15.5*   INR 1.2 1.2 1.2       Imaging Studies:                           All appropriate imaging studies and reports reviewed: Yes       Ct Abdomen Pelvis Wo Contrast Additional Contrast? Oral    Result Date: 4/14/2019  EXAMINATION: CT OF THE ABDOMEN AND PELVIS WITHOUT CONTRAST 4/14/2019 7:34 pm TECHNIQUE: CT of the abdomen and pelvis was performed without the administration of intravenous contrast. Multiplanar reformatted images are provided for review. Dose modulation, iterative reconstruction, and/or weight based adjustment of the mA/kV was utilized to reduce the radiation dose to as low as reasonably achievable. COMPARISON: 04/11/2019 HISTORY: ORDERING SYSTEM PROVIDED HISTORY: ABDOMINAL PAIN TECHNOLOGIST PROVIDED HISTORY: Water soluble contrast only please Ordering Physician Provided Reason for Exam: Abdominal pain - Vented patient. Contrast given via nurse through NG tube. Acuity: Unknown Type of Exam: Unknown Relevant Medical/Surgical History: Hx - Sepsis due to urinary tract infection. FINDINGS: Lower Chest: New moderate layering bilateral pleural effusions with bilateral lower lobe atelectasis. Organs: Limited evaluation due lack of intravenous contrast.  Cholelithiasis redemonstrated.   No gallbladder wall thickening or biliary ductal dilatation. Scattered tiny hypodense lesions in the liver are too small to characterize but statistically represent benign cysts or hemangiomas and appear unchanged. The pancreas, spleen, adrenal glands, and kidneys are unremarkable. There is no hydronephrosis or urinary tract calculus. GI/Bowel: The stomach is distended. Enteric tube is in place. No contrast is seen distal to the pylorus and there is contrast reflux into the distal esophagus. There is no evidence of bowel obstruction. The appendix is not definitely visualized. No focal pericecal inflammatory changes are evident. Pelvis: The urinary bladder is decompressed by Tran catheter. No pelvic mass is seen. Peritoneum/Retroperitoneum: Small amount of free fluid in the pelvic cavity. No free air or focal fluid collection. No abnormal lymph node. Normal abdominal aortic caliber. Moderate calcific atherosclerosis. Bones/Soft Tissues: No acute osseous abnormality. Diffuse anasarca. Moderate degenerative changes in the lumbar spine. 1. Distended, contrast filled stomach with reflux of contrast into the esophagus. No enteric contrast is seen distal to the pylorus suggesting delayed gastric emptying in the setting of ileus. 2. New moderate layering bilateral pleural effusions with bilateral lower lobe atelectasis. 3. Small amount of nonspecific free fluid in the pelvic cavity. 4. Anasarca. 5. Cholelithiasis. Xr Chest (single View Frontal)    Result Date: 4/13/2019  EXAMINATION: SINGLE XRAY VIEW OF THE CHEST 4/13/2019 7:18 am COMPARISON: April 12, 2019 HISTORY: ORDERING SYSTEM PROVIDED HISTORY: dyspnea TECHNOLOGIST PROVIDED HISTORY: dyspnea Ordering Physician Provided Reason for Exam: dyspnea Acuity: Acute Type of Exam: Subsequent/Follow-up Additional signs and symptoms: dyspnea FINDINGS: A right IJ catheter is seen with its tip terminating at the superior cavoatrial junction. The left chest wall pacemaker and leads are stable. The cardiomediastinal silhouette is stable. There is interval increased opacity at the right lung base, may be related to atelectasis versus pneumonia. There is small atelectasis and mild pleural effusion at the left lung base. There is no pneumothorax. There is no acute osseous abnormality. Interval increased opacity at the right lung base, may be related to atelectasis versus pneumonia. Small atelectasis and mild pleural effusion at the left lung, new since the prior study. Xr Femur Left (min 2 Views)    Result Date: 3/27/2019  EXAMINATION: 4 XRAY VIEWS OF THE LEFT FEMUR; 2 XRAY VIEWS OF THE LEFT KNEE; 3 XRAY VIEWS OF THE LEFT TIBIA AND FIBULA 3/27/2019 7:00 pm COMPARISON: Left hip 11/14/2015. HISTORY: ORDERING SYSTEM PROVIDED HISTORY: pain TECHNOLOGIST PROVIDED HISTORY: pain Ordering Physician Provided Reason for Exam: pt twisted wrong, lt knee pain, unable to straighten knee. Acuity: Acute Type of Exam: Initial FINDINGS: Left femur, four views: The bones are diffusely osteopenic. No acute fracture deformity. The hip joint is maintained. The femoral head projects within the acetabulum. No focal soft tissue abnormality is seen. Left knee, two views: The knee is flexed. No acute osseous abnormality. No joint effusion. Joint spaces are not optimally profiled but no significant arthritic changes are apparent. Left tib-fib, three views: There is evidence of a remote healed distal tibia fracture. No acute fracture or dislocation is seen. No focal soft tissue abnormality. No acute osseous abnormality of the left femur. No acute osseous abnormality of the left knee. No acute osseous abnormality of the left tib-fib. Healed remote distal tibia fracture. Marked osteopenia, likely due to disuse.      Xr Knee Left (1-2 Views)    Result Date: 3/27/2019  EXAMINATION: 4 XRAY VIEWS OF THE LEFT FEMUR; 2 XRAY VIEWS OF THE LEFT KNEE; 3 XRAY VIEWS OF THE LEFT TIBIA AND FIBULA 3/27/2019 7:00 pm COMPARISON: Left hip 11/14/2015. HISTORY: ORDERING SYSTEM PROVIDED HISTORY: pain TECHNOLOGIST PROVIDED HISTORY: pain Ordering Physician Provided Reason for Exam: pt twisted wrong, lt knee pain, unable to straighten knee. Acuity: Acute Type of Exam: Initial FINDINGS: Left femur, four views: The bones are diffusely osteopenic. No acute fracture deformity. The hip joint is maintained. The femoral head projects within the acetabulum. No focal soft tissue abnormality is seen. Left knee, two views: The knee is flexed. No acute osseous abnormality. No joint effusion. Joint spaces are not optimally profiled but no significant arthritic changes are apparent. Left tib-fib, three views: There is evidence of a remote healed distal tibia fracture. No acute fracture or dislocation is seen. No focal soft tissue abnormality. No acute osseous abnormality of the left femur. No acute osseous abnormality of the left knee. No acute osseous abnormality of the left tib-fib. Healed remote distal tibia fracture. Marked osteopenia, likely due to disuse. Xr Knee Left (3 Views)    Result Date: 3/30/2019  EXAMINATION: 3 XRAY VIEWS OF THE LEFT KNEE 3/30/2019 10:58 am COMPARISON: March 27, 2018 HISTORY: ORDERING SYSTEM PROVIDED HISTORY: F/u L knee pain after cast TECHNOLOGIST PROVIDED HISTORY: AP, oblique, lateral F/u L knee pain after cast FINDINGS: Marked osteopenia. Interval casting, which degrades fine osseous detail question cortical offset in the lateral femoral metaphysis. No malalignment identified. No significant joint effusion. Interval casting. Question nondisplaced fracture in the lateral femoral metaphysis. Osteopenia. Xr Tibia Fibula Left (2 Views)    Result Date: 3/27/2019  EXAMINATION: 4 XRAY VIEWS OF THE LEFT FEMUR; 2 XRAY VIEWS OF THE LEFT KNEE; 3 XRAY VIEWS OF THE LEFT TIBIA AND FIBULA 3/27/2019 7:00 pm COMPARISON: Left hip 11/14/2015.  HISTORY: ORDERING SYSTEM PROVIDED HISTORY: pain TECHNOLOGIST PROVIDED HISTORY: pain Ordering Physician Provided Reason for Exam: pt twisted wrong, lt knee pain, unable to straighten knee. Acuity: Acute Type of Exam: Initial FINDINGS: Left femur, four views: The bones are diffusely osteopenic. No acute fracture deformity. The hip joint is maintained. The femoral head projects within the acetabulum. No focal soft tissue abnormality is seen. Left knee, two views: The knee is flexed. No acute osseous abnormality. No joint effusion. Joint spaces are not optimally profiled but no significant arthritic changes are apparent. Left tib-fib, three views: There is evidence of a remote healed distal tibia fracture. No acute fracture or dislocation is seen. No focal soft tissue abnormality. No acute osseous abnormality of the left femur. No acute osseous abnormality of the left knee. No acute osseous abnormality of the left tib-fib. Healed remote distal tibia fracture. Marked osteopenia, likely due to disuse. Xr Abdomen (kub) (single Ap View)    Result Date: 4/14/2019  EXAMINATION: SINGLE SUPINE XRAY VIEW(S) OF THE ABDOMEN 4/14/2019 7:39 am COMPARISON: CT abdomen and pelvis  film from 11 April 2019 HISTORY: [facility]: Abd Distention TECHNOLOGIST PROVIDED HISTORY: Abd Distention Ordering Physician Provided Reason for Exam: Abdominal distention. Pt was moving around in the bed. Best films at present time. Acuity: Acute Type of Exam: Initial Additional signs and symptoms: Abdominal distention. Pt was moving around in the bed. Best films at present time. FINDINGS: Portable view time stamped at 748 hours demonstrates an intestinal tube terminating in the midportion of a gaseous Spike dilated stomach. Densities are present over the stomach likely medication. Bipolar pacemaker is in situ with intact leads. Heart size is top-normal, stable.   Gaseous distension of the stomach and loop of bowel in the upper mid abdomen is noted but there is gas and fecal material in the rectum. Gastric outlet obstruction or proximal small bowel partial obstruction is suspected. No free air is noted. Midline city is present over the pelvis likely a monitor or CT small bore catheter. Vascular calcification is present in the pelvis. Persistent preferential gaseous distension of the stomach although there is some gas in the upper abdomen and gas and fecal material present in the rectosigmoid. Findings suggest gastric outlet obstruction. Ct Abdomen Pelvis W Iv Contrast    Result Date: 4/11/2019  EXAMINATION: CT OF THE ABDOMEN AND PELVIS WITH CONTRAST 4/11/2019 5:14 pm TECHNIQUE: CT of the abdomen and pelvis was performed with the administration of intravenous contrast. Multiplanar reformatted images are provided for review. Dose modulation, iterative reconstruction, and/or weight based adjustment of the mA/kV was utilized to reduce the radiation dose to as low as reasonably achievable. COMPARISON: None. HISTORY: ORDERING SYSTEM PROVIDED HISTORY: Abdominal pain TECHNOLOGIST PROVIDED HISTORY: IV Only Contrast Ordering Physician Provided Reason for Exam: patient c/o abd pain for an hour FINDINGS: Lower Chest: Trace pleural fluid bilaterally is noted. Indwelling cardiac pacemaker is present. Heart size is normal.  Coronary artery calcifications are evident. The esophagus is significantly dilated and fluid-filled. No paraesophageal adenopathy is evident. Organs: The spleen, pancreas, and adrenals are unremarkable. The liver contains hypodensities, likely cysts. The gallbladder is distended and contains a solitary gallstone. The kidneys excrete contrast bilaterally. Extrarenal collecting systems are noted. The ureters are mildly dilated down to the urinary bladder without evidence of intraluminal or ureteral obstructing calculi. GI/Bowel: Marked distention of the stomach is noted; this continues to the pylorus with appearance suggesting partial gastric outlet obstruction. Fluid is present in some small bowel loops and colon distal to the stomach. No small bowel obstruction. Pelvis: Marked distention of the urinary bladder is noted. 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 Distended ureters may be related to reflux or infection. No free pelvic fluid, pelvic or inguinal adenopathy is noted. Peritoneum/Retroperitoneum: No aortic aneurysm. Mild to moderate plaque is noted in the infrarenal abdominal aorta and both proximal iliac arteries. Shotty lymph nodes are present around the aorta in the upper abdomen. No mesenteric adenopathy is noted. Bones/Soft Tissues: Degenerative changes are present in the hips and lower lumbar facets. Estimated biologic radiation dose for this procedure:258.77 mGy/cm2.     1. Dilatation of the thoracic esophagus filled with fluid. No obstructive process is noted. No adjacent enlarged lymph nodes. 2. Trace pleural fluid. 3. Marked distention of the stomach. This appears to extend to the pylorus. Partial gastric outlet obstruction is not excluded. 4. Bilateral dilated ureters without obstructing calculi. Urinary bladder is markedly distended with bladder wall air suggesting emphysematous cystitis. Dilatation of the ureters may be related to reflux or infection. 5. Atherosclerotic disease. 6. Other findings as above. Critical results were called by Dr. Elias Jenkins MD to [address] on 4/11/2019 at 17:37.      Xr Chest Portable    Result Date: 4/16/2019  EXAMINATION: SINGLE XRAY VIEW OF THE CHEST 4/16/2019 6:54 am COMPARISON: 04/15/2019, 610 hours HISTORY: ORDERING SYSTEM PROVIDED HISTORY: ETT placement TECHNOLOGIST PROVIDED HISTORY: ETT placement Ordering Physician Provided Reason for Exam: on vent Acuity: Acute Type of Exam: Initial 64-year-old female on ventilator; check endotracheal tube placement FINDINGS: Portable AP upright view of the chest. Endotracheal tube distal tip overlying the mid trachea approximately 4.1 cm above the level of the ron. Enteric tube traverses the GE junction with distal tip excluded from the field of view. Left subclavian approach cardiac pacemaker device distal lead tips relatively stable in position. Right internal jugular approach central venous catheter distal tip overlying the high right atrium, stable. Cardiac monitor leads overlie the chest. Atherosclerotic calcification of the thoracic aorta. Slight stable volume loss of the left hemithorax. No pneumothorax. No free air. Dense retrocardiac/left basilar airspace consolidation and small left-sided pleural effusion. Stable mild focal opacity at the right mid lung zone. Underlying COPD. Stable mild pulmonary vascular congestion and left-sided predominant parahilar opacity. Visualized osseous structures remain unchanged. 1. Stable multifocal airspace disease as detailed above with dense retrocardiac/left basilar airspace consolidation and small left-sided pleural effusion. Mild pulmonary vascular congestion. Findings may represent edema or multifocal pneumonia. 2. Underlying COPD. 3. Tubes and line as detailed above. Xr Chest Portable    Result Date: 4/15/2019  EXAMINATION: SINGLE XRAY VIEW OF THE CHEST 4/15/2019 6:47 am COMPARISON: 14 April 2019 HISTORY: ORDERING SYSTEM PROVIDED HISTORY: ETT placement TECHNOLOGIST PROVIDED HISTORY: ETT placement Ordering Physician Provided Reason for Exam: on vent Acuity: Acute Type of Exam: Initial FINDINGS: AP portable view of the chest time stamped at 612 hours demonstrates overlying cardiac monitoring electrodes. Endotracheal tube terminates 4 cm above the ron. Bipolar pacemaker enters from the left with intact leads in appropriate positions. Intestinal tube extends beyond the fundus of the stomach, tip not included. Right internal jugular catheter terminates at the cavoatrial junction. Heart size is normal.  Aortic arch is calcified.   There is interval improvement in vascular congestion with resolution of perihilar opacities. Some bibasilar opacities remain. No extrapleural air is noted. Osseous structures are stable. Interval improvement in vascular congestion and bilateral opacities consistent with resolving pulmonary edema. Tubes and lines as above. Xr Chest Portable    Result Date: 4/14/2019  EXAMINATION: SINGLE XRAY VIEW OF THE CHEST 4/14/2019 7:57 am COMPARISON: Portable chest 04/13/2019. HISTORY: ORDERING SYSTEM PROVIDED HISTORY: Intubation TECHNOLOGIST PROVIDED HISTORY: Intubation Ordering Physician Provided Reason for Exam: intubation Acuity: Acute Type of Exam: Initial Additional signs and symptoms: intubation FINDINGS: Endotracheal tube terminates over the midthoracic trachea. Dual-chamber pacemaker leads appear unchanged in position. Right IJ approach central venous catheter unchanged in position. Heart size not substantially changed. Perihilar and basilar opacities further increased. Left pleural effusion increased in size. Findings may reflect pulmonary edema, progressed from yesterday's exam.  Left pleural effusion increased in size. Xr Chest Portable    Result Date: 4/12/2019  EXAMINATION: SINGLE XRAY VIEW OF THE CHEST 4/12/2019 5:26 am COMPARISON: November 14, 2015. HISTORY: ORDERING SYSTEM PROVIDED HISTORY: line placement TECHNOLOGIST PROVIDED HISTORY: line placement Ordering Physician Provided Reason for Exam: New right side line placement. Acuity: Acute Type of Exam: Initial Additional signs and symptoms: New right side line placement. FINDINGS: Stable left pectoral trans venous cardiac pacer device. New right IJ central venous catheter with tip near the superior atrial caval junction. Normal lung volume. No new consolidation. Curvilinear radiopacity projecting over the right upper lobe likely represents artifact from a skin fold. No pleural effusion or pneumothorax.   Stable cardiomediastinal silhouette and great vessels with redemonstration of atherosclerotic thoracic aorta. New right IJ central venous catheter with tip near the superior atrial caval junction. No pneumothorax. No new consolidation. Thank you for allowing me to participate in the care of your patient. Please feel free to contact me with any questions or concerns.      Romel Cleveland MD

## 2019-05-06 NOTE — PROGRESS NOTES
Nutrition Assessment (Parenteral Nutrition)    Type and Reason for Visit: Reassess    Nutrition Recommendations: Following changes made in additives: Na acetate- 30 to 0 mEq, NaCl- 200 to 230 mEq, K Phos- 30 to 34 mmol, add MVI & trace elements. Follow for long term nutrition plan. Nutrition Assessment: Patient has not improved with PO intakes, GI took pt to have NG placed under sedation and do flex sig, once in surgery she changed her mind and was taken back to her room. Palliative care working with pt to determine her goals of care. TPN and lipids continue. Malnutrition Assessment:  · Malnutrition Status: At risk for malnutrition  · Context: Acute illness or injury  · Findings of the 6 clinical characteristics of malnutrition (Minimum of 2 out of 6 clinical characteristics is required to make the diagnosis of moderate or severe Protein Calorie Malnutrition based on AND/ASPEN Guidelines):  1. Energy Intake-Less than or equal to 75% of estimated energy requirement(Previously; improving with parenteral nutrition), Greater than or equal to 5 days    2. Weight Loss-Unable to assess(edema present),    3. Fat Loss-Unable to assess,    4. Muscle Loss-Unable to assess,    5. Fluid Accumulation-Mild fluid accumulation, Extremities  6.  Strength-Not measured    Nutrition Risk Level: High    Nutrient Needs:  · Estimated Daily Total Kcal: 1257-5434 based on wt of 25-27 per kg using wt of 57.2 kg  · Estimated Daily Protein (g): 63-68 based on 1.4-1.5 gm/kg IBW(revised)    Nutrition Diagnosis:   · Problem: Inadequate oral intake  · Etiology: related to Alteration in GI function, Insufficient energy/nutrient consumption     Signs and symptoms:  as evidenced by Intake 0-25%, Nutrition support - PN, GI abnormality(GI bleed)    Objective Information:  · Nutrition-Focused Physical Findings: No appetite. Abdominal pain and occult blood.  Edema: +1 RUE, +2 LUE, +1 RLE, +2 LLE   · Wound Type: Multiple, Pressure Ulcer, Deep Tissue Injury(Wound under cast)  · Current Nutrition Therapies:  · Oral Diet Orders: Clear Liquid   · Oral Diet intake: 0%  · Oral Nutrition Supplement (ONS) Orders: None  · ONS intake: 0%  · Parenteral Nutrition Orders:  · Type and Formula: Premix Central, 2-in-1 Custom   · Lipids: 100ml, Daily  · Rate/Volume: 65 ml/ 1560 ml daily  · Duration: Continuous  · Current PN Order Provides: 1573 kcals, 78 gm of protein (lipids included)  · Goal PN Orders Provides: 3429-3753 kcal; 63-68 gm pro  · Additional Calories: None  · Anthropometric Measures:  · Ht: 5' (152.4 cm)   · Current Body Wt: 111 lb (50.3 kg)  · Admission Body Wt: 102 lb (46.3 kg)  · Ideal Body Wt: 100 lb (45.4 kg), % Ideal Body 108%  · BMI Classification: BMI 18.5 - 24.9 Normal Weight(Based on admission wt)    Nutrition Interventions:   Continue NPO, Modify current Parenteral Nutrition  Continued Inpatient Monitoring, Education Not Indicated    Nutrition Evaluation:   · Evaluation: Progressing toward goals   · Goals: PN to meet greater 90% of estimated nutrition needs   · Monitoring: Nutrition Progression, Meal Intake, Weight, Pertinent Labs, Monitor Bowel Function, PN Intake, PN Tolerance, Diet Tolerance, Skin Integrity, I&O      SHITAL Wilburn R.D..   Clinical Dietitian  Office: [phone]

## 2019-05-06 NOTE — PLAN OF CARE
Our plan was to place an NG tube with the patient is sedated, and to do a flex sig and see if there is any source of bleeding we can treat, and see there is any reason for her to have any: Obstruction.   Especially with the elevated tumor markers etc.  Patient has refused colonoscopy before  And today she is refusing flex sig  Spend a lot of time trying to convince her explained the pros and cons the rest of the alternatives  Patient very mad she doesn't want even talk about it she was to go back to her room and she insists on refusing

## 2019-05-06 NOTE — PROGRESS NOTES
Regular rate and rhythm   Abdomen - Soft, nontender, nondistended, no masses or organomegaly  Neurologic - There are no focal motor or sensory deficits  Skin - No bruising or bleeding  Extremities - No clubbing, cyanosis, edema    MEDS      sodium chloride  250 mL Intravenous Once    sodium chloride  250 mL Intravenous Once    predniSONE  10 mg Oral Daily    mometasone-formoterol  2 puff Inhalation BID    pantoprazole  40 mg Oral QAM AC    metoprolol tartrate  12.5 mg Oral BID    fat emulsion  100 mL Intravenous Daily    furosemide  40 mg Intravenous Daily    magnesium sulfate  1 g Intravenous Once    ipratropium  0.5 mg Nebulization Q4H    insulin lispro  0-12 Units Subcutaneous Q6H    levalbuterol  1.25 mg Nebulization Q4H    venlafaxine  37.5 mg Oral TID    aspirin  81 mg Oral Daily    sodium chloride flush  10 mL Intravenous 2 times per day      PN-Adult 2-in-1 Central Line (Standard) 65 mL/hr at 05/05/19 1857    sodium chloride 10 mL/hr at 04/30/19 0029    dextrose       sodium chloride flush, morphine, LORazepam, ondansetron, zolpidem, sodium chloride flush, hydrOXYzine, metoprolol, sodium chloride nebulizer, fentanNYL, oxyCODONE-acetaminophen, magnesium sulfate, sodium phosphate IVPB **OR** sodium phosphate IVPB, potassium chloride **OR** potassium alternative oral replacement **OR** potassium chloride, glucose, dextrose, glucagon (rDNA), dextrose, milk and molasses, sodium chloride flush, acetaminophen    LABS   CBC   Recent Labs     05/06/19  0527   WBC 9.4   HGB 7.2*   HCT 21.7*   MCV 87.3        BMP:   Lab Results   Component Value Date     05/06/2019    K 4.2 05/06/2019    CL 97 05/06/2019    CO2 27 05/06/2019    BUN 21 05/06/2019    LABALBU 1.8 05/06/2019    LABALBU 4.6 05/17/2012    CREATININE <0.40 05/06/2019    CALCIUM 7.5 05/06/2019    GFRAA CANNOT BE CALCULATED 05/06/2019    LABGLOM CANNOT BE CALCULATED 05/06/2019     ABGs:  Lab Results   Component Value Date    PHART 7.519 04/19/2019    PO2ART 73.3 04/19/2019    GDW5BBZ 42.5 04/19/2019      Lab Results   Component Value Date    MODE PRVC 04/19/2019     Ionized Calcium:  No results found for: IONCA  Magnesium:    Lab Results   Component Value Date    MG 1.8 05/06/2019     Phosphorus:    Lab Results   Component Value Date    PHOS 3.3 05/06/2019        LIVER PROFILE   Recent Labs     05/06/19 0527   AST 15   ALT 15   BILITOT 0.31   ALKPHOS 161*     INR   Recent Labs     05/06/19 0527   INR 1.2     PTT   Lab Results   Component Value Date    APTT 27.7 03/28/2012         RADIOLOGY     She refused her x-ray    ASSESSMENT/PLAN   Principal Problem:    GI bleed  Active Problems:    Sepsis due to urinary tract infection (HCC)    Cystitis    Emphysematous cystitis    Septic shock (HCC)    Leukemoid reaction    Aspiration pneumonia of right lower lobe due to vomit (HCC)    MRSA carrier    Urinary retention    Abdominal distention    Elevated CEA    Elevated CA 19-9 level    Cholecystitis    CRP elevated    Elevated procalcitonin    Bandemia    Centrilobular emphysema (HCC)    Rectal bleeding    Anemia  Resolved Problems:    * No resolved hospital problems. *    Clinically has moderate to severe COPD, but not in any distress. Refusing therapies and certain labs.   Continued to complain of abdominal pain and receiving morphine, which will make her ileus worse  Still wants to be full code  Electronically signed by Ammy Huggins MD on 5/6/2019 at 11:16 AM

## 2019-05-06 NOTE — PROGRESS NOTES
hospice care. We updated the patient that if she continues to refuse treatment that she would have a negative outcome and that she could die. We updated her that if she wanted to do nothing and to go home with hospice , that we could arrange that as well, and that we would honor her wishes. She wanted some time to think about it . Will follow for goals of care and support. I then did see that she agreed to the sigmoidoscopy and was being transported downstairs to surgery. Goals/Plan of care  Education/support to staff  Education/support to patient  Discharge planning/helping to coordinate care  Providing support for coping/adaptation/distress of patient  Continue with current plan of care  Code status clarified: Full Code  Continued communication updates  The bedside nurse and I talked to the patient about her wishes for medical treatment , and if she wanted to continue refusing , that she had the option iof comfort care or hospice. Patient decided to proceed with the sigmoidoscopy. Pulmonary noted that the patient wanted to remain a full code. Emotional support offered to the patient . Will follow along for goals of care and support. Ron Dunn [address] [facility] VICKY Galan  [facility]: [phone]  [address]: [phone]  Work Cell: [phone]

## 2019-05-06 NOTE — PLAN OF CARE
No falls or injuries this shift. Patient safety maintained, Patient skin integrity maintained shift. No new skin issues to report. Medicated as ordered PRN for comfort.

## 2019-05-07 ENCOUNTER — CLINICAL DOCUMENTATION (OUTPATIENT)
Dept: ORTHOPEDIC SURGERY | Age: 69
End: 2019-05-07

## 2019-05-07 ENCOUNTER — APPOINTMENT (OUTPATIENT)
Dept: GENERAL RADIOLOGY | Age: 69
DRG: 853 | End: 2019-05-07
Payer: COMMERCIAL

## 2019-05-07 LAB
ABO/RH: NORMAL
ABSOLUTE BANDS #: 1.67 K/UL (ref 0–1)
ABSOLUTE EOS #: 0.2 K/UL (ref 0–0.4)
ABSOLUTE IMMATURE GRANULOCYTE: ABNORMAL K/UL (ref 0–0.3)
ABSOLUTE LYMPH #: 1.37 K/UL (ref 1–4.8)
ABSOLUTE MONO #: 0.2 K/UL (ref 0.1–1.3)
ALBUMIN SERPL-MCNC: 1.8 G/DL (ref 3.5–5.2)
ALBUMIN/GLOBULIN RATIO: ABNORMAL (ref 1–2.5)
ALP BLD-CCNC: 198 U/L (ref 35–104)
ALT SERPL-CCNC: 17 U/L (ref 5–33)
ANION GAP SERPL CALCULATED.3IONS-SCNC: 7 MMOL/L (ref 9–17)
ANTIBODY SCREEN: NEGATIVE
ARM BAND NUMBER: NORMAL
AST SERPL-CCNC: 19 U/L
BANDS: 17 % (ref 0–10)
BASOPHILS # BLD: 0 % (ref 0–2)
BASOPHILS ABSOLUTE: 0 K/UL (ref 0–0.2)
BILIRUB SERPL-MCNC: 0.28 MG/DL (ref 0.3–1.2)
BLD PROD TYP BPU: NORMAL
BLD PROD TYP BPU: NORMAL
BUN BLDV-MCNC: 17 MG/DL (ref 8–23)
BUN/CREAT BLD: ABNORMAL (ref 9–20)
C-REACTIVE PROTEIN: 84.1 MG/L (ref 0–5)
CALCIUM SERPL-MCNC: 7.3 MG/DL (ref 8.6–10.4)
CHLORIDE BLD-SCNC: 101 MMOL/L (ref 98–107)
CO2: 24 MMOL/L (ref 20–31)
CREAT SERPL-MCNC: <0.4 MG/DL (ref 0.5–0.9)
CROSSMATCH RESULT: NORMAL
CROSSMATCH RESULT: NORMAL
DIFFERENTIAL TYPE: ABNORMAL
DISPENSE STATUS BLOOD BANK: NORMAL
DISPENSE STATUS BLOOD BANK: NORMAL
EOSINOPHILS RELATIVE PERCENT: 2 % (ref 0–4)
EXPIRATION DATE: NORMAL
GFR AFRICAN AMERICAN: ABNORMAL ML/MIN
GFR NON-AFRICAN AMERICAN: ABNORMAL ML/MIN
GFR SERPL CREATININE-BSD FRML MDRD: ABNORMAL ML/MIN/{1.73_M2}
GFR SERPL CREATININE-BSD FRML MDRD: ABNORMAL ML/MIN/{1.73_M2}
GLUCOSE BLD-MCNC: 103 MG/DL (ref 70–99)
GLUCOSE BLD-MCNC: 112 MG/DL (ref 65–105)
GLUCOSE BLD-MCNC: 115 MG/DL (ref 65–105)
GLUCOSE BLD-MCNC: 118 MG/DL (ref 65–105)
GLUCOSE BLD-MCNC: 119 MG/DL (ref 65–105)
HCT VFR BLD CALC: 21.6 % (ref 36–46)
HCT VFR BLD CALC: 23 % (ref 36–46)
HCT VFR BLD CALC: 23.6 % (ref 36–46)
HCT VFR BLD CALC: 24.8 % (ref 36–46)
HEMOGLOBIN: 7.1 G/DL (ref 12–16)
HEMOGLOBIN: 7.2 G/DL (ref 12–16)
HEMOGLOBIN: 7.6 G/DL (ref 12–16)
HEMOGLOBIN: 7.9 G/DL (ref 12–16)
IMMATURE GRANULOCYTES: ABNORMAL %
INR BLD: 1.2
LYMPHOCYTES # BLD: 14 % (ref 24–44)
MAGNESIUM: 1.7 MG/DL (ref 1.6–2.6)
MCH RBC QN AUTO: 29.1 PG (ref 26–34)
MCHC RBC AUTO-ENTMCNC: 33 G/DL (ref 31–37)
MCV RBC AUTO: 88.1 FL (ref 80–100)
METAMYELOCYTES ABSOLUTE COUNT: 0.2 K/UL
METAMYELOCYTES: 2 %
MONOCYTES # BLD: 2 % (ref 1–7)
MORPHOLOGY: ABNORMAL
MYELOCYTES ABSOLUTE COUNT: 0.1 K/UL
MYELOCYTES: 1 %
NRBC AUTOMATED: ABNORMAL PER 100 WBC
PDW BLD-RTO: 17.1 % (ref 11.5–14.9)
PHOSPHORUS: 3.1 MG/DL (ref 2.6–4.5)
PLATELET # BLD: 286 K/UL (ref 150–450)
PLATELET ESTIMATE: ABNORMAL
PMV BLD AUTO: 8.6 FL (ref 6–12)
POTASSIUM SERPL-SCNC: 4.6 MMOL/L (ref 3.7–5.3)
PROCALCITONIN: 0.08 NG/ML
PROTHROMBIN TIME: 15.5 SEC (ref 11.8–14.6)
RBC # BLD: 2.45 M/UL (ref 4–5.2)
RBC # BLD: ABNORMAL 10*6/UL
SEDIMENTATION RATE, ERYTHROCYTE: 75 MM (ref 0–20)
SEG NEUTROPHILS: 62 % (ref 36–66)
SEGMENTED NEUTROPHILS ABSOLUTE COUNT: 6.06 K/UL (ref 1.3–9.1)
SODIUM BLD-SCNC: 132 MMOL/L (ref 135–144)
TOTAL PROTEIN: 4.6 G/DL (ref 6.4–8.3)
TRANSFUSION STATUS: NORMAL
TRANSFUSION STATUS: NORMAL
UNIT DIVISION: 0
UNIT DIVISION: 0
UNIT NUMBER: NORMAL
UNIT NUMBER: NORMAL
WBC # BLD: 9.8 K/UL (ref 3.5–11)
WBC # BLD: ABNORMAL 10*3/UL

## 2019-05-07 PROCEDURE — 2060000000 HC ICU INTERMEDIATE R&B

## 2019-05-07 PROCEDURE — 99232 SBSQ HOSP IP/OBS MODERATE 35: CPT | Performed by: INTERNAL MEDICINE

## 2019-05-07 PROCEDURE — 2580000003 HC RX 258: Performed by: INTERNAL MEDICINE

## 2019-05-07 PROCEDURE — 90792 PSYCH DIAG EVAL W/MED SRVCS: CPT | Performed by: PSYCHIATRY & NEUROLOGY

## 2019-05-07 PROCEDURE — 97530 THERAPEUTIC ACTIVITIES: CPT

## 2019-05-07 PROCEDURE — 85651 RBC SED RATE NONAUTOMATED: CPT

## 2019-05-07 PROCEDURE — 2500000003 HC RX 250 WO HCPCS: Performed by: INTERNAL MEDICINE

## 2019-05-07 PROCEDURE — 36415 COLL VENOUS BLD VENIPUNCTURE: CPT

## 2019-05-07 PROCEDURE — 84100 ASSAY OF PHOSPHORUS: CPT

## 2019-05-07 PROCEDURE — 80053 COMPREHEN METABOLIC PANEL: CPT

## 2019-05-07 PROCEDURE — 85610 PROTHROMBIN TIME: CPT

## 2019-05-07 PROCEDURE — 6360000002 HC RX W HCPCS: Performed by: INTERNAL MEDICINE

## 2019-05-07 PROCEDURE — 6370000000 HC RX 637 (ALT 250 FOR IP): Performed by: INTERNAL MEDICINE

## 2019-05-07 PROCEDURE — 86140 C-REACTIVE PROTEIN: CPT

## 2019-05-07 PROCEDURE — 83735 ASSAY OF MAGNESIUM: CPT

## 2019-05-07 PROCEDURE — 82947 ASSAY GLUCOSE BLOOD QUANT: CPT

## 2019-05-07 PROCEDURE — 99233 SBSQ HOSP IP/OBS HIGH 50: CPT | Performed by: INTERNAL MEDICINE

## 2019-05-07 PROCEDURE — APPNB30 APP NON BILLABLE TIME 0-30 MINS: Performed by: NURSE PRACTITIONER

## 2019-05-07 PROCEDURE — 2500000003 HC RX 250 WO HCPCS: Performed by: SURGERY

## 2019-05-07 PROCEDURE — 85025 COMPLETE CBC W/AUTO DIFF WBC: CPT

## 2019-05-07 PROCEDURE — 84145 PROCALCITONIN (PCT): CPT

## 2019-05-07 PROCEDURE — 85018 HEMOGLOBIN: CPT

## 2019-05-07 PROCEDURE — 73560 X-RAY EXAM OF KNEE 1 OR 2: CPT

## 2019-05-07 PROCEDURE — 93970 EXTREMITY STUDY: CPT

## 2019-05-07 PROCEDURE — 85014 HEMATOCRIT: CPT

## 2019-05-07 RX ADMIN — METOPROLOL TARTRATE 10 MG: 5 INJECTION INTRAVENOUS at 12:37

## 2019-05-07 RX ADMIN — Medication 10 ML: at 10:51

## 2019-05-07 RX ADMIN — CALCIUM GLUCONATE: 94 INJECTION, SOLUTION INTRAVENOUS at 18:06

## 2019-05-07 RX ADMIN — FENTANYL CITRATE 50 MCG: 50 INJECTION INTRAMUSCULAR; INTRAVENOUS at 06:09

## 2019-05-07 RX ADMIN — I.V. FAT EMULSION 100 ML: 20 EMULSION INTRAVENOUS at 18:06

## 2019-05-07 RX ADMIN — Medication 2 PUFF: at 21:02

## 2019-05-07 RX ADMIN — FENTANYL CITRATE 50 MCG: 50 INJECTION INTRAMUSCULAR; INTRAVENOUS at 02:06

## 2019-05-07 RX ADMIN — Medication 10 ML: at 21:02

## 2019-05-07 RX ADMIN — FUROSEMIDE 40 MG: 10 INJECTION, SOLUTION INTRAMUSCULAR; INTRAVENOUS at 10:47

## 2019-05-07 RX ADMIN — LORAZEPAM 0.5 MG: 2 INJECTION INTRAMUSCULAR; INTRAVENOUS at 22:04

## 2019-05-07 RX ADMIN — SODIUM CHLORIDE: 9 INJECTION, SOLUTION INTRAVENOUS at 12:40

## 2019-05-07 RX ADMIN — LORAZEPAM 0.5 MG: 2 INJECTION INTRAMUSCULAR; INTRAVENOUS at 16:29

## 2019-05-07 RX ADMIN — VENLAFAXINE 37.5 MG: 37.5 TABLET ORAL at 21:06

## 2019-05-07 RX ADMIN — MORPHINE SULFATE 2 MG: 2 INJECTION, SOLUTION INTRAMUSCULAR; INTRAVENOUS at 10:48

## 2019-05-07 RX ADMIN — LORAZEPAM 0.5 MG: 2 INJECTION INTRAMUSCULAR; INTRAVENOUS at 04:02

## 2019-05-07 RX ADMIN — FENTANYL CITRATE 50 MCG: 50 INJECTION INTRAMUSCULAR; INTRAVENOUS at 22:04

## 2019-05-07 RX ADMIN — METOPROLOL TARTRATE 12.5 MG: 25 TABLET ORAL at 21:03

## 2019-05-07 RX ADMIN — Medication 2 PUFF: at 10:47

## 2019-05-07 ASSESSMENT — PAIN SCALES - GENERAL
PAINLEVEL_OUTOF10: 10
PAINLEVEL_OUTOF10: 10
PAINLEVEL_OUTOF10: 0
PAINLEVEL_OUTOF10: 10
PAINLEVEL_OUTOF10: 0
PAINLEVEL_OUTOF10: 0
PAINLEVEL_OUTOF10: 10

## 2019-05-07 ASSESSMENT — ENCOUNTER SYMPTOMS
NAUSEA: 0
SHORTNESS OF BREATH: 0
VOMITING: 0
COUGH: 0
CONSTIPATION: 0
ABDOMINAL PAIN: 0
DIARRHEA: 0

## 2019-05-07 ASSESSMENT — PAIN DESCRIPTION - LOCATION: LOCATION: BACK

## 2019-05-07 ASSESSMENT — PAIN DESCRIPTION - PAIN TYPE: TYPE: CHRONIC PAIN

## 2019-05-07 NOTE — PROGRESS NOTES
Cassius Kuhn was notified of patient consult for psych and also told that consult has to be done by a psychiatrist on perfect serve. Per Cassius Kuhn:  Scheduled 5.7.19 at 2:15 pm with Dr Vincent Morgan. Please have the nurse in the room for the consult.  Thank you

## 2019-05-07 NOTE — PROGRESS NOTES
organomegaly  Neurologic - There are no focal motor or sensory deficits  Skin - No bruising or bleeding  Extremities - No clubbing, cyanosis, edema    MEDS      sodium chloride  250 mL Intravenous Once    sodium chloride  250 mL Intravenous Once    predniSONE  10 mg Oral Daily    mometasone-formoterol  2 puff Inhalation BID    pantoprazole  40 mg Oral QAM AC    metoprolol tartrate  12.5 mg Oral BID    fat emulsion  100 mL Intravenous Daily    furosemide  40 mg Intravenous Daily    magnesium sulfate  1 g Intravenous Once    ipratropium  0.5 mg Nebulization Q4H    insulin lispro  0-12 Units Subcutaneous Q6H    levalbuterol  1.25 mg Nebulization Q4H    venlafaxine  37.5 mg Oral TID    aspirin  81 mg Oral Daily    sodium chloride flush  10 mL Intravenous 2 times per day      PN-Adult 2-in-1 Central Line (Standard) 65 mL/hr at 05/06/19 2029    sodium chloride 10 mL/hr at 05/06/19 1219    dextrose       sodium chloride flush, [Held by provider] morphine, LORazepam, ondansetron, zolpidem, sodium chloride flush, hydrOXYzine, metoprolol, sodium chloride nebulizer, fentanNYL, oxyCODONE-acetaminophen, magnesium sulfate, sodium phosphate IVPB **OR** sodium phosphate IVPB, potassium chloride **OR** potassium alternative oral replacement **OR** potassium chloride, glucose, dextrose, glucagon (rDNA), dextrose, milk and molasses, sodium chloride flush, acetaminophen    LABS   CBC   Recent Labs     05/07/19  0509   WBC 9.8   HGB 7.1*   HCT 21.6*   MCV 88.1        BMP:   Lab Results   Component Value Date     05/07/2019    K 4.6 05/07/2019     05/07/2019    CO2 24 05/07/2019    BUN 17 05/07/2019    LABALBU 1.8 05/07/2019    LABALBU 4.6 05/17/2012    CREATININE <0.40 05/07/2019    CALCIUM 7.3 05/07/2019    GFRAA CANNOT BE CALCULATED 05/07/2019    LABGLOM CANNOT BE CALCULATED 05/07/2019     ABGs:  Lab Results   Component Value Date    PHART 7.519 04/19/2019    PO2ART 73.3 04/19/2019    PPX7NIS 42.5 04/19/2019      Lab Results   Component Value Date    MODE PRVC 04/19/2019     Ionized Calcium:  No results found for: IONCA  Magnesium:    Lab Results   Component Value Date    MG 1.7 05/07/2019     Phosphorus:    Lab Results   Component Value Date    PHOS 3.1 05/07/2019        LIVER PROFILE   Recent Labs     05/07/19  0509   AST 19   ALT 17   BILITOT 0.28*   ALKPHOS 198*     INR   Recent Labs     05/07/19  0509   INR 1.2     PTT   Lab Results   Component Value Date    APTT 27.7 03/28/2012         RADIOLOGY     (See actual reports for details)    ASSESSMENT/PLAN   Principal Problem:    GI bleed  Active Problems:    Sepsis due to urinary tract infection (HCC)    Cystitis    Emphysematous cystitis    Septic shock (HCC)    Leukemoid reaction    Aspiration pneumonia of right lower lobe due to vomit (HCC)    MRSA carrier    Urinary retention    Abdominal distention    Elevated CEA    Elevated CA 19-9 level    Cholecystitis    CRP elevated    Elevated procalcitonin    Bandemia    Centrilobular emphysema (HCC)    Rectal bleeding    Anemia  Resolved Problems:    * No resolved hospital problems.  *    Strongly suggest psychiatric evaluation for possible competency  Would also suggest getting risk management involved in her case she is refusing care, and at the same time she is limited code status  Stable COPD  Electronically signed by Lizzette Escamilla MD on 5/7/2019 at 11:02 AM

## 2019-05-07 NOTE — PLAN OF CARE
Ongoing  Note:   Medicated as ordered and positioned for comfort.  Patient slept comfortably for periods of time  Goal: Control of acute pain  Description  Control of acute pain  5/7/2019 0547 by Charis Villafuerte RN  Outcome: Ongoing  5/7/2019 0540 by Charis Villafuerte RN  Outcome: Ongoing  Goal: Control of chronic pain  Description  Control of chronic pain  5/7/2019 0547 by Charis Villafuerte RN  Outcome: Ongoing  5/7/2019 0540 by Charis Villafuerte RN  Outcome: Ongoing     Problem: Nutrition  Goal: Optimal nutrition therapy  Description       5/7/2019 0547 by Charis Villafuerte RN  Outcome: Ongoing  5/7/2019 0540 by Charis Villafuerte RN  Outcome: Ongoing

## 2019-05-07 NOTE — VIRTUAL HEALTH
medical history of GI bleeding who has been admitted since 4/11. Psych was consulted because pt has been refusing treatments such as surgical intervention and NGT. She has not been consistent in her decisions as well. She has no hx of psych hx. Today she did speak with me for a few minutes. She was very quiet and would only with very brief answers. She said that she didn't really know why she was in the hospital. She said that she has been hurting in her stomach. She did say that she was anxious about surgery and was afraid that \"something\" may happen. She is taking PRN ativan for that. She said that she has been feeling depressed \"off and on\" but not to bad. She said that her sleep has been fair. She is NPO and gets TPN. She said that she does live by herself and has family in [facility] [place] [facility]. After those answers she said that she was tired and didn't wish to talk to me anymore. She then closed her eyes and wouldn't participate any further. PSYCHIATRIC HISTORY:      No past psych hx    Past Medical History:        Diagnosis Date    Abnormal computed tomography of cervical spine     sclerotic bone appearance     CVA (cerebral vascular accident) ([address]     left  side weakness    GERD (gastroesophageal reflux disease)     Hypertension     Paraproteinemia     Weight loss      Past Surgical History:        Procedure Laterality Date    INTUBATION  4/14/2019         PACEMAKER PLACEMENT  07/2011    Pacemaker is Medtronic Revo (compatible). Leads placed in 1995 are NOT MRI compatible. Placed at [employer]. V's per Dr. Kory Owusu can not have an MRI.  UPPER GASTROINTESTINAL ENDOSCOPY N/A 4/16/2019    EGD ESOPHAGOGASTRODUODENOSCOPY @ [place] [address]  ICU 2002 performed by Kkii Gordon MD at [address]     Medications Prior to Admission:   Medications Prior to Admission: oxyCODONE-acetaminophen (PERCOCET) 5-325 MG per tablet, Take 1 tablet by mouth every 6 hours as needed for Pain.   senna-docusate (PERICOLACE) 8.6-50 MG per options and consequences   B. Must understand and remember conversations about the treatment and what may happen if they refuse   C. Must be able to express their treatment choice and give a rational explanation why.    D. Must be able to rationalize the risks/benefits of treatment options. 3. I would recommend that she be given a Mini Mental Status Exam to determine confusion. I am unable to do this via tele service but it can be performed by neuro  4. I would also recommend an Ethic consult and to contact any family the pt may have    Thank you for the consult.  Please reconsult if any new issues arise

## 2019-05-07 NOTE — FLOWSHEET NOTE
05/07/19 1211   Encounter Summary   Services provided to: Patient   Referral/Consult From: Ольга   Continue Visiting   (5/7/19V)   Volunteer Visit Yes   Complexity of Encounter Low   Length of Encounter 15 minutes   Routine   Type Follow up   Spiritual/Yarsanism   Type Spiritual support   Intervention Prayer   Sacraments   Communion Patient is currently unable

## 2019-05-07 NOTE — PROGRESS NOTES
Nutrition Orders:  · Type and Formula: Premix Central, 2-in-1 Custom   · Lipids: 100ml, Daily  · Rate/Volume: 65 ml/ 1560 ml daily  · Duration: Continuous  · Current PN Order Provides: 1573 kcals, 78 gm of protein (lipids included)  · Goal PN Orders Provides: 9601-6649 kcal; 63-68 gm pro  · Additional Calories: None  · Anthropometric Measures:  · Ht: 5' (152.4 cm)   · Current Body Wt: 111 lb (50.3 kg)  · Admission Body Wt: 102 lb (46.3 kg)  · Ideal Body Wt: 100 lb (45.4 kg), % Ideal Body 108%  · BMI Classification: BMI 18.5 - 24.9 Normal Weight(Based on admission wt)    Nutrition Interventions:   Continue NPO, Modify current Parenteral Nutrition  Continued Inpatient Monitoring, Education Not Indicated    Nutrition Evaluation:   · Evaluation: Progressing toward goals   · Goals: PN to meet greater 90% of estimated nutrition needs   · Monitoring: Nutrition Progression, Meal Intake, Weight, Pertinent Labs, Monitor Bowel Function, PN Intake, PN Tolerance, Diet Tolerance, Skin Integrity, I&O      Zack Vallejo R.D. L.JOSE.   Clinical Dietitian  Office: [phone]

## 2019-05-07 NOTE — PROGRESS NOTES
[facility]    Gastroenterology Daily Progress Note      Patient:   Chet Chang   :    1950   Facility:   [facility]   Date:     2019  Consultant:   Enrique Menard, CNP      SUBJECTIVE  77 y.o. female admitted 2019 with Sepsis due to urinary tract infection ([facility] [address] [A41.9, N39.0]  Cystitis [N30.90]  Cystitis [N30.90] and seen for anemia with rectal bleeding and abdominal pain. The pt was seen and examined. She reports right sided abdominal pain near her cholecystectomy tube. She continues to refuse a NG tube for her SBO, she is refusing any surgical intervention and refused the flex sigmoidoscopy yesterday. She has been changed to a limited code and a psych consult is ordered to determine competency. Hgb is 7.1. She has not had overt bleeding, emesis overnight.          OBJECTIVE  Scheduled Meds:   sodium chloride  250 mL Intravenous Once    sodium chloride  250 mL Intravenous Once    predniSONE  10 mg Oral Daily    mometasone-formoterol  2 puff Inhalation BID    pantoprazole  40 mg Oral QAM AC    metoprolol tartrate  12.5 mg Oral BID    fat emulsion  100 mL Intravenous Daily    furosemide  40 mg Intravenous Daily    magnesium sulfate  1 g Intravenous Once    ipratropium  0.5 mg Nebulization Q4H    insulin lispro  0-12 Units Subcutaneous Q6H    levalbuterol  1.25 mg Nebulization Q4H    venlafaxine  37.5 mg Oral TID    aspirin  81 mg Oral Daily    sodium chloride flush  10 mL Intravenous 2 times per day       Vital Signs:  BP (!) 142/85   Pulse 107   Temp 99.7 °F (37.6 °C) (Oral)   Resp 16   Ht 5' (1.524 m)   Wt 111 lb 8.8 oz (50.6 kg)   SpO2 100%   BMI 21.79 kg/m²      Physical Exam:   General appearance: Alert & oriented, NAD ill appearing  Lungs: CTA bilaterally    Heart: S1S2 RRR  Abdomen: Soft, Nontender, Not distended, BS WNL cholecystostomy tube with bile drainage  Skin: No jaundice, No clubbing, No cyanosis    Lab and Imaging Review Bones/Soft Tissues: No acute osseous abnormality.  Diffuse anasarca. Moderate degenerative changes in the lumbar spine.           Impression   1. Distended, contrast filled stomach with reflux of contrast into the   esophagus.  No enteric contrast is seen distal to the pylorus suggesting   delayed gastric emptying in the setting of ileus. 2. New moderate layering bilateral pleural effusions with bilateral lower   lobe atelectasis. 3. Small amount of nonspecific free fluid in the pelvic cavity. 4. Anasarca. 5. Cholelithiasis.        FINDINGS:GB US 4/19/19   Pancreas is not well visualized.       No liver lesion.       Gallbladder wall thickening.  Gallbladder sludge.  Cholelithiasis. Pericholecystic fluid.       Common bile duct measures at the upper limits of normal at 7 mm.           Impression   Findings suggesting acute cholecystitis.      ESOPHAGOGASTRODUODENOSCOPY   ( EGD )  DATE OF PROCEDURE: 4/16/2019      Berton Kayser, MD        POSTOPERATIVE Aniyah Loss has esophagitis.     Has a large amount of retained solid food in the upper part of the stomach and fundus.  Antrum is clear.  Pylorus wide open and did not show signs of stenosis.     OPERATION: Upper GI endoscopy          Findings:     Retropharyngeal area was grossly normal appearing     Esophagus: abnormal: Has a grade 2 esophagitis.  Appears peptic esophagitis.  Has a small sliding hiatal hernia.     Stomach:  Fundus of the stomach and body of the stomach.  With solid food.  75% of the lumen is occupied with solid food.     Antrum: No retained food.  Able to advance the scope through the pylorus without difficulty.  No pyloric stenosis seen.  No signs of outlet obstruction.     Duodenum:     Descending: normal    Bulb: normal  Minimal liquid present in the 2nd part of the duodenum.     FINDINGS:5/3/19 NM bleeding scan   Activity is seen within the blood pool, including the great vessels, heart,   liver, and spleen.  There was no abnormal accumulation of radioactivity in   the abdomen to suggest active bleeding during study acquisition.           Impression   No evidence of active GI bleeding during acquisition.         FINDINGS:ct abd 5/5/19   Evaluation is limited by motion.       Lower Chest: Moderate bilateral pleural effusions.       Organs: Multiple liver hypodensities measuring up to 4 mm are incompletely   characterized but in the absence of risk factors likely represent cysts   requiring no specific follow-up.  Cholecystostomy tube is in place.  No   adjacent focal drainable fluid collection.  No pancreatic lesion.  No   splenomegaly.  No adrenal lesion.  No hydronephrosis.  No renal lesion.       GI/Bowel: Stomach is markedly distended.  Swirled appearance of the mesentery   is seen within the mid to lower abdomen with adjacent thickened loops of   small bowel.       Peritoneum/Retroperitoneum: Atherosclerotic calcification of the abdominal   aorta without aneurysmal dilatation.  No adenopathy.       Bones/Soft Tissues: No acute soft tissue abnormality. Diffuse osteopenia. Lumbar spine degenerative changes.           Impression   Bowel obstruction which is suspected to be caused by an internal hernia.       Cholecystostomy tube is in place.  No surrounding fluid collection.         ASSESSMENT/PLAN:  1. Anemia with continued bright red blood per rectum no overt bleeding overnight  Refusing colonoscopy and flex sig  Will continue to monitor very closely   await psych eval     2. Intra-abdominal hernia with small bowel obstruction: Refusing surgery refusing NG tube     3. Cholecystitis s/p cholecystectomy tube placement 4/22/19   CT shows  no collection of fluid, white count is normal     .4.elevated tumor markers, mildly elevated alk phos, unable to have mri r/t pacemaker      This plan was formulated in collaboration with  .     Electronically signed by: AGAPITO Gomez CNP on 5/7/2019 at 11:07 AM Attending Physician Statement  I have discussed the care of [facility]. and   I have examined the patient myselft and taken ros and hpi , including pertinent history and exam findings,  with the author of this note . I have reviewed the key elements of all parts of the encounter with the nurse practitioner/resident.     I agree with the assessment, plan and orders as documented by the above health care provider       Electronically signed by Everardo José MD

## 2019-05-07 NOTE — PROGRESS NOTES
[address] [facility]    OCCUPATIONAL THERAPY MISSED TREATMENT NOTE   INPATIENT   Date: 19  Patient Name: Gatito Gutierrez       Room: 6913/1109-43  MRN: [id number]   Account #: [de-identified]    : 1950  (77 y.o.)  Gender: female   Referring Practitioner: Obie Whitten MD  Diagnosis: Sepsis due to UTI             REASON FOR MISSED TREATMENT:  Patient refusal   -   Other - Attempted cotreat. Pt became agitated with intiaition of repositioning and refused further participation. Will continue to follow pt for needs. [address] [facility]

## 2019-05-07 NOTE — PROGRESS NOTES
[address] [facility].    PROGRESS NOTE             5/7/2019    9:15 AM    Name:   Angelika Jay  MRN:     [id number]     Acct:      [de-identified]   Room:   [address] [place] [address]  IP Day:  32  Admit Date:  4/11/2019  2:48 PM    PCP:  Erinn Tilley DO  Code Status:  Limited    Subjective:     C/C:   Chief Complaint   Patient presents with    Abdominal Pain     Interval History Status: not changed. Patient seen bedside on progressive. No acute events overnight. VSS. Tachycardic >100 <110   Patient is refusing all surgical intervention. She is refusing most medical intervention. Risks discussed with patient, she voiced understanding. Per nurses, patient seems to go back and forth on decisions. Will get psych consult to assess mentation. Possible [facility] placement. Brief History:   The patient is a 68 y.o.  presents sepsis secondary to acute cystitis.         Review of Systems:     Review of Systems   Constitutional: Positive for fatigue. Negative for chills and fever. Respiratory: Negative for cough and shortness of breath. Cardiovascular: Negative for chest pain, palpitations and leg swelling. Gastrointestinal: Negative for abdominal pain, constipation, diarrhea, nausea and vomiting. Genitourinary: Negative for dysuria and frequency. Musculoskeletal: Negative for myalgias. Neurological: Positive for weakness. Negative for light-headedness and headaches. Psychiatric/Behavioral: Negative for confusion. The patient is not nervous/anxious. Medications: Allergies:     Allergies   Allergen Reactions    Penicillins Shortness Of Breath and Rash    Amoxicillin        Current Meds:   Scheduled Meds:    sodium chloride  250 mL Intravenous Once    sodium chloride  250 mL Intravenous Once    predniSONE  10 mg Oral Daily    mometasone-formoterol  2 puff Inhalation BID    pantoprazole  40 mg Oral QAM AC    metoprolol tartrate  12.5 mg Oral BID    fat emulsion  100 mL Intravenous Daily    furosemide  40 mg Intravenous Daily    magnesium sulfate  1 g Intravenous Once    ipratropium  0.5 mg Nebulization Q4H    insulin lispro  0-12 Units Subcutaneous Q6H    levalbuterol  1.25 mg Nebulization Q4H    venlafaxine  37.5 mg Oral TID    aspirin  81 mg Oral Daily    sodium chloride flush  10 mL Intravenous 2 times per day     Continuous Infusions:    PN-Adult 2-in-1 Central Line (Standard) 65 mL/hr at 19    sodium chloride 10 mL/hr at 19 1219    dextrose       PRN Meds: sodium chloride flush, [Held by provider] morphine, LORazepam, ondansetron, zolpidem, sodium chloride flush, hydrOXYzine, metoprolol, sodium chloride nebulizer, fentanNYL, oxyCODONE-acetaminophen, magnesium sulfate, sodium phosphate IVPB **OR** sodium phosphate IVPB, potassium chloride **OR** potassium alternative oral replacement **OR** potassium chloride, glucose, dextrose, glucagon (rDNA), dextrose, milk and molasses, sodium chloride flush, acetaminophen    Data:     Past Medical History:   has a past medical history of Abnormal computed tomography of cervical spine, CVA (cerebral vascular accident) ([address] GERD (gastroesophageal reflux disease), Hypertension, Paraproteinemia, and Weight loss. Social History:   reports that she has been smoking. She has a 30.00 pack-year smoking history. She has never used smokeless tobacco. She reports that she drank about 16.8 oz of alcohol per week. She reports that she does not use drugs. Family History: History reviewed. No pertinent family history.     Vitals:  BP (!) 142/85   Pulse 107   Temp 99.7 °F (37.6 °C) (Oral)   Resp 16   Ht 5' (1.524 m)   Wt 111 lb 8.8 oz (50.6 kg)   SpO2 100%   BMI 21.79 kg/m²   Temp (24hrs), Av.5 °F (36.9 °C), Min:97.9 °F (36.6 °C), Max:99.7 °F (37.6 °C)    Recent Labs     19  0736 19  1150 19  1624 19  0713   [facility] 92 112*       I/O(24Hr): Intake/Output Summary (Last 24 hours) at 5/7/2019 0915  Last data filed at 5/7/2019 2433  Gross per 24 hour   Intake 1589 ml   Output 1300 ml   Net 289 ml       Labs:    Lab Results   Component Value Date    WBC 9.8 05/07/2019    HGB 7.1 (L) 05/07/2019    HCT 21.6 (L) 05/07/2019    MCV 88.1 05/07/2019     05/07/2019     Lab Results   Component Value Date     (L) 05/07/2019    K 4.6 05/07/2019     05/07/2019    CO2 24 05/07/2019    BUN 17 05/07/2019    CREATININE <0.40 (L) 05/07/2019    GLUCOSE 103 (H) 05/07/2019    CALCIUM 7.3 (L) 05/07/2019    PROT 4.6 (L) 05/07/2019    LABALBU 1.8 (L) 05/07/2019    BILITOT 0.28 (L) 05/07/2019    ALKPHOS 198 (H) 05/07/2019    AST 19 05/07/2019    ALT 17 05/07/2019    LABGLOM CANNOT BE CALCULATED 05/07/2019    GFRAA CANNOT BE CALCULATED 05/07/2019    GLOB NOT REPORTED 04/18/2019           Lab Results   Component Value Date/Time    SPECIAL NOT REPORTED 04/23/2019 10:10 PM     Lab Results   Component Value Date/Time    CULTURE (A) 04/22/2019 04:59 PM     YEAST, NOT CANDIDA ALBICANS OR CANDIDA DUBLINIENSIS SCANT GROWTH    CULTURE (A) 04/22/2019 04:59 PM     KLEBSIELLA PNEUMONIAE ONE COLONY FORMING UNIT THIS ORGANISM IS AN EXTENDED-SPECTRUM BETA-LACTAMASE  AND RESISTANCE TO THERAPY WITH PENICILLINS, CEPHALOSPORINS AND AZTREONAM IS EXPECTED. THESE ORGANISMS GENERALLY REMAIN SUSCEPTIBLE TO CARBAPENEMS. CONSIDER ID CONSULTATION. CULTURE NO ANAEROBIC ORGANISMS ISOLATED AT 5 DAYS (A) 04/22/2019 04:59 PM       [facility]    Radiology:    Ct Abdomen Pelvis Wo Contrast Additional Contrast? Oral    Result Date: 4/14/2019  EXAMINATION: CT OF THE ABDOMEN AND PELVIS WITHOUT CONTRAST 4/14/2019 7:34 pm TECHNIQUE: CT of the abdomen and pelvis was performed without the administration of intravenous contrast. Multiplanar reformatted images are provided for review.  Dose modulation, iterative reconstruction, and/or weight based adjustment of the mA/kV was utilized to reduce the radiation dose to as low as reasonably achievable. COMPARISON: 04/11/2019 HISTORY: ORDERING SYSTEM PROVIDED HISTORY: ABDOMINAL PAIN TECHNOLOGIST PROVIDED HISTORY: Water soluble contrast only please Ordering Physician Provided Reason for Exam: Abdominal pain - Vented patient. Contrast given via nurse through NG tube. Acuity: Unknown Type of Exam: Unknown Relevant Medical/Surgical History: Hx - Sepsis due to urinary tract infection. FINDINGS: Lower Chest: New moderate layering bilateral pleural effusions with bilateral lower lobe atelectasis. Organs: Limited evaluation due lack of intravenous contrast.  Cholelithiasis redemonstrated. No gallbladder wall thickening or biliary ductal dilatation. Scattered tiny hypodense lesions in the liver are too small to characterize but statistically represent benign cysts or hemangiomas and appear unchanged. The pancreas, spleen, adrenal glands, and kidneys are unremarkable. There is no hydronephrosis or urinary tract calculus. GI/Bowel: The stomach is distended. Enteric tube is in place. No contrast is seen distal to the pylorus and there is contrast reflux into the distal esophagus. There is no evidence of bowel obstruction. The appendix is not definitely visualized. No focal pericecal inflammatory changes are evident. Pelvis: The urinary bladder is decompressed by Tran catheter. No pelvic mass is seen. Peritoneum/Retroperitoneum: Small amount of free fluid in the pelvic cavity. No free air or focal fluid collection. No abnormal lymph node. Normal abdominal aortic caliber. Moderate calcific atherosclerosis. Bones/Soft Tissues: No acute osseous abnormality. Diffuse anasarca. Moderate degenerative changes in the lumbar spine. 1. Distended, contrast filled stomach with reflux of contrast into the esophagus. No enteric contrast is seen distal to the pylorus suggesting delayed gastric emptying in the setting of ileus.  2. New moderate layering bilateral pleural effusions with bilateral lower lobe atelectasis. 3. Small amount of nonspecific free fluid in the pelvic cavity. 4. Anasarca. 5. Cholelithiasis. Xr Chest (single View Frontal)    Result Date: 5/2/2019  EXAMINATION: SINGLE XRAY VIEW OF THE CHEST 5/2/2019 6:34 am COMPARISON: 29 April 2019 HISTORY: ORDERING SYSTEM PROVIDED HISTORY: COPD TECHNOLOGIST PROVIDED HISTORY: COPD Ordering Physician Provided Reason for Exam: COPD Acuity: Acute Type of Exam: Initial FINDINGS: AP portable view of the chest time stamped at 630 hours demonstrates stable cardiac size. Overlying cardiac monitoring electrodes are present. Right-sided PICC line terminates in the right atrium. Bipolar pacemaker enters from the left with intact leads in appropriate positions. There is been no significant interval change in bilateral interstitial opacities, representing interval change since 2015. This may be related to worsening interstitial disease or superimposed venous congestion. Bilateral effusions are noted with bibasilar opacities, either atelectasis or edema. Continued bilateral effusions, bibasilar opacities and bilateral interstitial opacities in the upper lobes. Findings may be related to worsening interstitial disease and edema. Airspace disease is not excluded. Xr Chest (single View Frontal)    Result Date: 4/13/2019  EXAMINATION: SINGLE XRAY VIEW OF THE CHEST 4/13/2019 7:18 am COMPARISON: April 12, 2019 HISTORY: ORDERING SYSTEM PROVIDED HISTORY: dyspnea TECHNOLOGIST PROVIDED HISTORY: dyspnea Ordering Physician Provided Reason for Exam: dyspnea Acuity: Acute Type of Exam: Subsequent/Follow-up Additional signs and symptoms: dyspnea FINDINGS: A right IJ catheter is seen with its tip terminating at the superior cavoatrial junction. The left chest wall pacemaker and leads are stable. The cardiomediastinal silhouette is stable.   There is interval increased opacity at the right lung base, may be related to atelectasis versus pneumonia. There is small atelectasis and mild pleural effusion at the left lung base. There is no pneumothorax. There is no acute osseous abnormality. Interval increased opacity at the right lung base, may be related to atelectasis versus pneumonia. Small atelectasis and mild pleural effusion at the left lung, new since the prior study. Xr Chest Standard (2 Vw)    Result Date: 4/29/2019  EXAMINATION: TWO VIEWS OF THE CHEST 4/29/2019 8:04 pm COMPARISON: 04/27/2019 HISTORY: ORDERING SYSTEM PROVIDED HISTORY: Follow-up lung infiltrate TECHNOLOGIST PROVIDED HISTORY: Follow-up lung infiltrate Ordering Physician Provided Reason for Exam: Follow-up infiltrate. Pt unable to lean forward - unable to get proper sponge behind pt for lateral. Pt unable to raise left arm at all for lateral. Best possible lateral obtained. Acuity: Unknown Type of Exam: Unknown Additional signs and symptoms: Follow-up infiltrate. Pt unable to lean forward - unable to get proper sponge behind pt for lateral. Pt unable to raise left arm at all for lateral. Best possible lateral obtained. FINDINGS: Left chest cardiac pacemaker device is in place. Right IJ central venous catheter in place with distal tip at the cavoatrial junction. Heart size is within normal limits. No vascular congestion. There are small to moderate pleural effusions. There are interstitial opacities in the upper lungs, which could be related to scarring and/or developing infiltrate, slightly improved in appearance when compared to 04/27/2019. No evidence of pneumothorax. 1.  Small to moderate bilateral pleural effusions, better visualized on lateral view. 2.  Upper lobe interstitial opacities, slightly improved when compared to the previous study, possibly related to underlying fibrotic change or residual infiltrate.      Xr Abdomen (kub) (single Ap View)    Result Date: 4/19/2019  EXAMINATION: SINGLE SUPINE XRAY VIEW(S) OF THE ABDOMEN 4/19/2019 9:48 am COMPARISON: April 14, 2019 HISTORY: ORDERING SYSTEM PROVIDED HISTORY: pain TECHNOLOGIST PROVIDED HISTORY: pain Ordering Physician Provided Reason for Exam: Abdominal pain and distentsion Acuity: Acute Type of Exam: Initial Additional signs and symptoms: Abdominal pain and distentsion FINDINGS: Enteric tube with the tip within the stomach. Small left pleural effusion. Minimal right pleural effusion. Mildly dilated loops of bowel. Nonspecific bowel gas pattern with mildly dilated loops of bowel. Xr Abdomen (kub) (single Ap View)    Result Date: 4/14/2019  EXAMINATION: SINGLE SUPINE XRAY VIEW(S) OF THE ABDOMEN 4/14/2019 7:39 am COMPARISON: CT abdomen and pelvis  film from 11 April 2019 HISTORY: [address]: Abd Distention TECHNOLOGIST PROVIDED HISTORY: Abd Distention Ordering Physician Provided Reason for Exam: Abdominal distention. Pt was moving around in the bed. Best films at present time. Acuity: Acute Type of Exam: Initial Additional signs and symptoms: Abdominal distention. Pt was moving around in the bed. Best films at present time. FINDINGS: Portable view time stamped at 748 hours demonstrates an intestinal tube terminating in the midportion of a gaseous Spike dilated stomach. Densities are present over the stomach likely medication. Bipolar pacemaker is in situ with intact leads. Heart size is top-normal, stable. Gaseous distension of the stomach and loop of bowel in the upper mid abdomen is noted but there is gas and fecal material in the rectum. Gastric outlet obstruction or proximal small bowel partial obstruction is suspected. No free air is noted. Midline city is present over the pelvis likely a monitor or CT small bore catheter. Vascular calcification is present in the pelvis. Persistent preferential gaseous distension of the stomach although there is some gas in the upper abdomen and gas and fecal material present in the rectosigmoid. Findings suggest gastric outlet obstruction. Ct Abdomen W Contrast Additional Contrast? Radiologist Recommendation    Result Date: 5/5/2019  EXAMINATION: CT ABDOMEN WITH CONTRAST, 5/5/2019 3:38 pm TECHNIQUE: CT of the abdomen was performed with the administration of intravenous contrast. Multiplanar reformatted images are provided for review. Dose modulation, iterative reconstruction, and/or weight based adjustment of the mA/kV was utilized to reduce the radiation dose to as low as reasonably achievable. COMPARISON: April 14, 2019 HISTORY: ORDERING SYSTEM PROVIDED HISTORY: Check for abscess/fluid collection around ashley tube site. TECHNOLOGIST PROVIDED HISTORY: Ordering Physician Provided Reason for Exam: Patient c/o abd pain around her ashley site and drainage tube. FINDINGS: Evaluation is limited by motion. Lower Chest: Moderate bilateral pleural effusions. Organs: Multiple liver hypodensities measuring up to 4 mm are incompletely characterized but in the absence of risk factors likely represent cysts requiring no specific follow-up. Cholecystostomy tube is in place. No adjacent focal drainable fluid collection. No pancreatic lesion. No splenomegaly. No adrenal lesion. No hydronephrosis. No renal lesion. GI/Bowel: Stomach is markedly distended. Swirled appearance of the mesentery is seen within the mid to lower abdomen with adjacent thickened loops of small bowel. Peritoneum/Retroperitoneum: Atherosclerotic calcification of the abdominal aorta without aneurysmal dilatation. No adenopathy. Bones/Soft Tissues: No acute soft tissue abnormality. Diffuse osteopenia. Lumbar spine degenerative changes. Bowel obstruction which is suspected to be caused by an internal hernia. Cholecystostomy tube is in place. No surrounding fluid collection.      Nm Gi Blood Loss    Result Date: 5/3/2019  EXAMINATION: NUCLEAR MEDICINE GASTRIC BLEEDING STUDY 5/3/2019 TECHNIQUE: Following the intravenous injection of 23.8 mCi of 99 mTc-labeled RBC's, a flow study and standard images of the abdomen was obtained over a total period of 60 minutes COMPARISON: None. HISTORY: ORDERING SYSTEM PROVIDED HISTORY: GI BLEEDING TECHNOLOGIST PROVIDED HISTORY: Ordering Physician Provided Reason for Exam: GI bleeding Acuity: Unknown Type of Exam: Unknown FINDINGS: Activity is seen within the blood pool, including the great vessels, heart, liver, and spleen. There was no abnormal accumulation of radioactivity in the abdomen to suggest active bleeding during study acquisition. No evidence of active GI bleeding during acquisition. RECOMMENDATIONS: If the patient shows hemodynamic signs of an active bleed in the next 20 hours, additional images can be acquired. Ct Abdomen Pelvis W Iv Contrast    Result Date: 4/11/2019  EXAMINATION: CT OF THE ABDOMEN AND PELVIS WITH CONTRAST 4/11/2019 5:14 pm TECHNIQUE: CT of the abdomen and pelvis was performed with the administration of intravenous contrast. Multiplanar reformatted images are provided for review. Dose modulation, iterative reconstruction, and/or weight based adjustment of the mA/kV was utilized to reduce the radiation dose to as low as reasonably achievable. COMPARISON: None. HISTORY: ORDERING SYSTEM PROVIDED HISTORY: Abdominal pain TECHNOLOGIST PROVIDED HISTORY: IV Only Contrast Ordering Physician Provided Reason for Exam: patient c/o abd pain for an hour FINDINGS: Lower Chest: Trace pleural fluid bilaterally is noted. Indwelling cardiac pacemaker is present. Heart size is normal.  Coronary artery calcifications are evident. The esophagus is significantly dilated and fluid-filled. No paraesophageal adenopathy is evident. Organs: The spleen, pancreas, and adrenals are unremarkable. The liver contains hypodensities, likely cysts. The gallbladder is distended and contains a solitary gallstone. The kidneys excrete contrast bilaterally. Extrarenal collecting systems are noted.   The ureters Physician Provided Reason for Exam: cough, congestion Acuity: Acute Type of Exam: Initial FINDINGS: Right IJ central venous catheter is in place tip in the SVC right atrial junction. Pacer wires are in place. The heart and mediastinal structures are stable. Pleural effusions are present with bibasilar atelectasis. Bilateral lung infiltrates are present in the upper lung fields. Persistent pleural effusions with bibasilar atelectasis and bilateral lung infiltrates with areas of consolidation developing in the upper lobes. Xr Chest Portable    Result Date: 4/26/2019  EXAMINATION: SINGLE XRAY VIEW OF THE CHEST 4/26/2019 6:51 am COMPARISON: April 24, 2019 HISTORY: ORDERING SYSTEM PROVIDED HISTORY: Pleural effusions TECHNOLOGIST PROVIDED HISTORY: Pleural effusions Ordering Physician Provided Reason for Exam: pleural effusion Acuity: Acute Type of Exam: Initial FINDINGS: Right IJ line in good position. Pacer device is stable. Cardiac leads overlie the chest.  Stable cardiomediastinal contours with calcified aortic arch. Left effusion and associated airspace disease, slightly decreased. Chronic blunting of right costophrenic sulcus may represent scarring or chronic effusion. Increased reticular markings right upper chest and left upper chest possibly interstitial edema or interstitial pneumonia. No new airspace consolidation. Increasing reticular markings upper chest bilaterally right greater than left possibly due to edema or interstitial pneumonitis. Improving left effusion with associated retrocardiac airspace disease. Pleural-parenchymal scarring or effusion in the right lung base, stable.      Xr Chest Portable    Result Date: 4/24/2019  EXAMINATION: SINGLE XRAY VIEW OF THE CHEST 4/24/2019 6:05 am COMPARISON: Chest radiograph dated 04/22/2019 HISTORY: ORDERING SYSTEM PROVIDED HISTORY: Acute respiratory failure, pleural effusion TECHNOLOGIST PROVIDED HISTORY: Acute respiratory failure, pleural effusion Ordering Physician Provided Reason for Exam: pleural effusion, respiratory failure. Acuity: Acute Type of Exam: Initial FINDINGS: Heart size is normal.  Dual-chamber pacemaker placed via left subclavian approach. Right internal jugular central line has tip in distal superior vena cava. Interval removal of nasogastric tube. No interval change of infiltrate and atelectasis at the left lung base. Stable mild infiltrate in the left upper lobe. Mild interval improvement of infiltrate at the right lung base. Stable small bilateral pleural effusions, left larger than right. Mild CHF, stable. 1.  No interval change of infiltrate and atelectasis at the left lung base. Stable mild infiltrate in the left upper lobe. 2.  Mild interval improvement of infiltrate at the right lung base. 3.  Stable small bilateral pleural effusions, left larger than right. 4.  Mild CHF, stable. Xr Chest Portable    Result Date: 4/22/2019  EXAMINATION: SINGLE XRAY VIEW OF THE CHEST 4/22/2019 6:44 am COMPARISON: April 21, 2019. HISTORY: ORDERING SYSTEM PROVIDED HISTORY: Hypoxia and CHF TECHNOLOGIST PROVIDED HISTORY: Hypoxia and CHF Ordering Physician Provided Reason for Exam: hx of hypoxia/CHF Acuity: Acute Type of Exam: Initial FINDINGS: Right IJ central venous catheter appears in unchanged position. Nasogastric tube courses below the diaphragm, with tip not imaged. Left chest wall pacemaker device projects in unchanged position. Cardiac and mediastinal contours are enlarged but unchanged. Unchanged bilateral pleural effusions and bibasilar pulmonary opacities. Unchanged findings suggestive of congestive heart failure. No evidence of pneumothorax. No new osseous abnormalities. 1. No significant change in findings suggestive of congestive heart failure. 2. Unchanged bilateral pleural effusions and bibasilar pulmonary consolidations. RECOMMENDATION: Radiographic follow-up to complete resolution.      Xr Chest Portable    Result Date: 4/21/2019  EXAMINATION: SINGLE XRAY VIEW OF THE CHEST 4/21/2019 3:08 pm COMPARISON: April 19, 2019 HISTORY: ORDERING SYSTEM PROVIDED HISTORY: hypoxia TECHNOLOGIST PROVIDED HISTORY: hypoxia Ordering Physician Provided Reason for Exam: hypoxia Acuity: Unknown Type of Exam: Unknown FINDINGS: Enteric tube in the stomach. ET tube is been removed. Right IJ catheter terminates in the atrial caval junction. Bipolar pacer on the left unchanged. Heart and mediastinum unremarkable. Moderate edema unchanged. Small effusions, left greater than right. Bony thorax intact. Status post extubation. Life support apparatus otherwise stable. Moderate edema and effusions unchanged. Xr Chest Portable    Result Date: 4/19/2019  EXAMINATION: SINGLE XRAY VIEW OF THE CHEST 4/19/2019 5:51 am COMPARISON: April 18, 2019 HISTORY: ORDERING SYSTEM PROVIDED HISTORY: ETT placement TECHNOLOGIST PROVIDED HISTORY: ETT placement Ordering Physician Provided Reason for Exam: Vent Pt check ETT placement Acuity: Acute Type of Exam: Subsequent/Follow-up Additional signs and symptoms: Vent Pt check ETT placement FINDINGS: Tubes and lines are unchanged. Persistent bibasilar lung opacities and pleural effusions. Mild pulmonary edema. No significant interval change. Xr Chest Portable    Result Date: 4/18/2019  EXAMINATION: SINGLE XRAY VIEW OF THE CHEST 4/18/2019 6:21 am COMPARISON: April 17, 2019 HISTORY: ORDERING SYSTEM PROVIDED HISTORY: ETT placement TECHNOLOGIST PROVIDED HISTORY: ETT placement Ordering Physician Provided Reason for Exam: on vent Acuity: Acute Type of Exam: Initial FINDINGS: Right IJ line and NG tube are stable. Interval advancement of ET tube terminating 3.1 cm from ron. Implanted cardiac device is present. Left-sided effusion and associated airspace disease is stable. Hazy right basilar opacity possibly edema or atelectasis. No pneumothorax.   Increased opacity left upper chest possibly focal area of atelectasis, new from prior. Advanced ETT from prior exam, now 3.1 cm from ron. Increased opacity left upper chest suspicious for atelectasis. Additional supporting devices are stable. Xr Chest Portable    Result Date: 4/17/2019  EXAMINATION: SINGLE XRAY VIEW OF THE CHEST 4/17/2019 7:15 am COMPARISON: 16 April 2019 HISTORY: ORDERING SYSTEM PROVIDED HISTORY: ETT placement TECHNOLOGIST PROVIDED HISTORY: ETT placement Ordering Physician Provided Reason for Exam: on vent Acuity: Acute Type of Exam: Initial FINDINGS: AP portable view of the chest time stamped at 613 hours demonstrates overlying cardiac monitoring electrodes. A right internal jugular catheter terminates in the superior vena cava. Endotracheal tube is somewhat high riding terminating 6.4 cm above the ron. Intestinal tube extends beyond the body of the stomach, tip not included on the exam.  Bipolar pacemaker enters from the left with intact leads in appropriate positions. Heart size is normal.  Left pleural effusion is noted there is continued volume loss in the left hemithorax with stable mild vascular congestion, perihilar opacities, and faint opacity in the right mid and lower lung field. Underlying COPD is noted. Osseous structures are stable. There is little change from prior study. Findings of COPD, mild central vascular congestion, left effusion, and scattered opacities favoring interstitial edema with multifocal pneumonitis not excluded. Tubes and lines as above. However, endotracheal tube is high riding. The findings were sent to the Radiology Results [id number] at 7:58 am on 4/17/2019to be communicated to a licensed caregiver. RECOMMENDATION: Suggest advancement of endotracheal tube.      Xr Chest Portable    Result Date: 4/16/2019  EXAMINATION: SINGLE XRAY VIEW OF THE CHEST 4/16/2019 6:54 am COMPARISON: 04/15/2019, 610 hours HISTORY: ORDERING SYSTEM PROVIDED HISTORY: ETT placement TECHNOLOGIST PROVIDED HISTORY: ETT placement Ordering Physician Provided Reason for Exam: on vent Acuity: Acute Type of Exam: Initial 78-year-old female on ventilator; check endotracheal tube placement FINDINGS: Portable AP upright view of the chest. Endotracheal tube distal tip overlying the mid trachea approximately 4.1 cm above the level of the ron. Enteric tube traverses the GE junction with distal tip excluded from the field of view. Left subclavian approach cardiac pacemaker device distal lead tips relatively stable in position. Right internal jugular approach central venous catheter distal tip overlying the high right atrium, stable. Cardiac monitor leads overlie the chest. Atherosclerotic calcification of the thoracic aorta. Slight stable volume loss of the left hemithorax. No pneumothorax. No free air. Dense retrocardiac/left basilar airspace consolidation and small left-sided pleural effusion. Stable mild focal opacity at the right mid lung zone. Underlying COPD. Stable mild pulmonary vascular congestion and left-sided predominant parahilar opacity. Visualized osseous structures remain unchanged. 1. Stable multifocal airspace disease as detailed above with dense retrocardiac/left basilar airspace consolidation and small left-sided pleural effusion. Mild pulmonary vascular congestion. Findings may represent edema or multifocal pneumonia. 2. Underlying COPD. 3. Tubes and line as detailed above. Xr Chest Portable    Result Date: 4/15/2019  EXAMINATION: SINGLE XRAY VIEW OF THE CHEST 4/15/2019 6:47 am COMPARISON: 14 April 2019 HISTORY: ORDERING SYSTEM PROVIDED HISTORY: ETT placement TECHNOLOGIST PROVIDED HISTORY: ETT placement Ordering Physician Provided Reason for Exam: on vent Acuity: Acute Type of Exam: Initial FINDINGS: AP portable view of the chest time stamped at 612 hours demonstrates overlying cardiac monitoring electrodes. Endotracheal tube terminates 4 cm above the ron.   Bipolar pacemaker enters from the left with intact leads in appropriate positions. Intestinal tube extends beyond the fundus of the stomach, tip not included. Right internal jugular catheter terminates at the cavoatrial junction. Heart size is normal.  Aortic arch is calcified. There is interval improvement in vascular congestion with resolution of perihilar opacities. Some bibasilar opacities remain. No extrapleural air is noted. Osseous structures are stable. Interval improvement in vascular congestion and bilateral opacities consistent with resolving pulmonary edema. Tubes and lines as above. Xr Chest Portable    Result Date: 4/14/2019  EXAMINATION: SINGLE XRAY VIEW OF THE CHEST 4/14/2019 7:57 am COMPARISON: Portable chest 04/13/2019. HISTORY: ORDERING SYSTEM PROVIDED HISTORY: Intubation TECHNOLOGIST PROVIDED HISTORY: Intubation Ordering Physician Provided Reason for Exam: intubation Acuity: Acute Type of Exam: Initial Additional signs and symptoms: intubation FINDINGS: Endotracheal tube terminates over the midthoracic trachea. Dual-chamber pacemaker leads appear unchanged in position. Right IJ approach central venous catheter unchanged in position. Heart size not substantially changed. Perihilar and basilar opacities further increased. Left pleural effusion increased in size. Findings may reflect pulmonary edema, progressed from yesterday's exam.  Left pleural effusion increased in size. Xr Chest Portable    Result Date: 4/12/2019  EXAMINATION: SINGLE XRAY VIEW OF THE CHEST 4/12/2019 5:26 am COMPARISON: November 14, 2015. HISTORY: ORDERING SYSTEM PROVIDED HISTORY: line placement TECHNOLOGIST PROVIDED HISTORY: line placement Ordering Physician Provided Reason for Exam: New right side line placement. Acuity: Acute Type of Exam: Initial Additional signs and symptoms: New right side line placement. FINDINGS: Stable left pectoral trans venous cardiac pacer device.   New right IJ central venous catheter with tip near the superior atrial caval junction. Normal lung volume. No new consolidation. Curvilinear radiopacity projecting over the right upper lobe likely represents artifact from a skin fold. No pleural effusion or pneumothorax. Stable cardiomediastinal silhouette and great vessels with redemonstration of atherosclerotic thoracic aorta. New right IJ central venous catheter with tip near the superior atrial caval junction. No pneumothorax. No new consolidation. Vl Upper Extremity Bilateral Venous Duplex    Result Date: 4/22/2019    [facility]  Vascular Upper Extremities Veins Procedure   Patient Name   Angela Costa     Date of Study           04/22/2019                 Samantha Her   Date of Birth  1950  Gender                  Female   Age            77 year(s)  Race                       Room Number    2002        Height:                 59.84 inch, 152 cm   Corporate ID # [id number]    Weight:                 102 pounds, 46.3 kg   Patient Acct # [de-identified]   BSA:        1.4 m^2     BMI:       20.03 kg/m^2   MR #           [id number]      Sonographer             Roderick Mario   Accession #    [id number]   Interpreting Physician  Yvette Benz   Referring                  Referring Physician     Myla Kline *  Nurse  Practitioner  Procedure Type of Study:   Veins: Upper Extremities Veins, Venous Scan Upper Bilateral.  Indications for Study:Swelling. Patient Status: In Patient. Technical Quality:Limited visualization. Limitation reason:Line placements. Conclusions   Summary   No evidence of superficial or deep venous thrombosis in both upper  extremities.    Signature   ----------------------------------------------------------------  Electronically signed by Roderick Mario(Sonographer) on  04/22/2019 11:46 AM  ----------------------------------------------------------------   ----------------------------------------------------------------  Electronically signed by MARLEEN Villar Tee(Interpreting  physician) on 04/22/2019 09:31 PM  ----------------------------------------------------------------  Findings:   Right Impression:                  Left Impression:  Internal jugular vein not          The internal jugular, subclavian,  visualized due to line placement. axillary, brachial, radial, and ulnar                                     veins demonstrate normal  Subclavian, axillary, brachial,    compressibility with phasic Doppler  radial, and ulnar veins            signals. demonstrate normal compressibility  with phasic Doppler signals. Normal compressibility of the cephalic                                     vein. Normal compressibility of the  cephalic vein. Normal compressibility of the basilic                                     vein. Normal compressibility of the  basilic vein. Velocities are measured in cm/s ; Diameters are measured in cm Right UE Vein Measurements 2D Measurements +------------------------------------+----------+---------------+----------+ ! Location                            ! Visualized! Compressibility! Thrombosis! +------------------------------------+----------+---------------+----------+ ! Prox SCV                            ! Yes       ! Yes            ! None      ! +------------------------------------+----------+---------------+----------+ ! Dist SCV                            ! Yes       ! Yes            ! None      ! +------------------------------------+----------+---------------+----------+ ! Prox Axillary                       ! Yes       ! Yes            ! None      ! +------------------------------------+----------+---------------+----------+ ! Dist Axillary                       ! Yes       ! Yes            ! None      ! +------------------------------------+----------+---------------+----------+ ! Prox Brachial                       !Yes       ! Yes            ! None      ! +------------------------------------+----------+---------------+----------+ ! Dist Brachial                       !Yes       ! Yes            ! None      ! +------------------------------------+----------+---------------+----------+ ! Prox Radial                         !Yes       ! Yes            ! None      ! +------------------------------------+----------+---------------+----------+ ! Dist Radial                         !Yes       ! Yes            ! None      ! +------------------------------------+----------+---------------+----------+ ! Prox Ulnar                          ! Yes       ! Yes            ! None      ! +------------------------------------+----------+---------------+----------+ ! Dist Ulnar                          ! Yes       ! Yes            ! None      ! +------------------------------------+----------+---------------+----------+ ! Basilic at UA                       ! Yes       ! Yes            ! None      ! +------------------------------------+----------+---------------+----------+ ! Basilic at AF                       ! Yes       ! Yes            ! None      ! +------------------------------------+----------+---------------+----------+ ! Basilic at [address]                       ! Yes       ! Yes            ! None      ! +------------------------------------+----------+---------------+----------+ ! Cephalic at UA                      ! Yes       ! Yes            ! None      ! +------------------------------------+----------+---------------+----------+ ! Cephalic at AF                      ! Yes       ! Yes            ! None      ! +------------------------------------+----------+---------------+----------+ ! Cephalic at [address]                      ! Yes       ! Yes            ! None      ! +------------------------------------+----------+---------------+----------+ Doppler Measurements +-------------------------+-----------------------+------------------------+ ! Location                 ! Signal                 !Reflux                  ! +-------------------------+-----------------------+------------------------+ ! SCV                      ! Phasic                 ! No                      ! +-------------------------+-----------------------+------------------------+ ! Axillary                 ! Phasic                 ! No                      ! +-------------------------+-----------------------+------------------------+ ! Brachial                 !Phasic                 ! No                      ! +-------------------------+-----------------------+------------------------+ Left UE Vein Measurements 2D Measurements +------------------------------------+----------+---------------+----------+ ! Location                            ! Visualized! Compressibility! Thrombosis! +------------------------------------+----------+---------------+----------+ ! Prox IJV                            ! Yes       ! Yes            ! None      ! +------------------------------------+----------+---------------+----------+ ! Dist IJV                            ! Yes       ! Yes            ! None      ! +------------------------------------+----------+---------------+----------+ ! Prox SCV                            ! Yes       ! Yes            ! None      ! +------------------------------------+----------+---------------+----------+ ! Dist SCV                            ! Yes       ! Yes            ! None      ! +------------------------------------+----------+---------------+----------+ ! Prox Axillary                       ! Yes       ! Yes            ! None      ! +------------------------------------+----------+---------------+----------+ ! Dist Axillary                       ! Yes       ! Yes            ! None      ! +------------------------------------+----------+---------------+----------+ ! Prox Brachial                       !Yes       ! Yes            ! None      ! +------------------------------------+----------+---------------+----------+ ! Dist Brachial                       !Yes       ! Yes !None      ! +------------------------------------+----------+---------------+----------+ ! Prox Radial                         !Yes       ! Yes            ! None      ! +------------------------------------+----------+---------------+----------+ ! Dist Radial                         !Yes       ! Yes            ! None      ! +------------------------------------+----------+---------------+----------+ ! Prox Ulnar                          ! Yes       ! Yes            ! None      ! +------------------------------------+----------+---------------+----------+ ! Dist Ulnar                          ! Yes       ! Yes            ! None      ! +------------------------------------+----------+---------------+----------+ ! Basilic at UA                       ! Yes       ! Yes            ! None      ! +------------------------------------+----------+---------------+----------+ ! Cephalic at UA                      ! Yes       ! Yes            ! None      ! +------------------------------------+----------+---------------+----------+ ! Cephalic at AF                      ! Yes       ! Yes            ! None      ! +------------------------------------+----------+---------------+----------+ Doppler Measurements +-------------------------+-----------------------+------------------------+ ! Location                 ! Signal                 !Reflux                  ! +-------------------------+-----------------------+------------------------+ ! IJV                      ! Phasic                 !                        ! +-------------------------+-----------------------+------------------------+ ! SCV                      ! Phasic                 !                        ! +-------------------------+-----------------------+------------------------+ ! Axillary                 ! Phasic                 !                        ! +-------------------------+-----------------------+------------------------+ ! Brachial                 !Phasic                 ! ! +-------------------------+-----------------------+------------------------+    Ir Cholecystostomy Percutaneous Complete    Result Date: 4/22/2019  PROCEDURE: ULTRASOUND and fluoroscopic GUIDED CHOLECYSTOSTOMY TUBE PLACEMENT April 22, 2019 HISTORY: ORDERING SYSTEM PROVIDED HISTORY: acute cholecystitis TECHNOLOGIST PROVIDED HISTORY: acute cholecystitis SEDATION: 75 mcg IV fentanyl for pain TECHNIQUE: Informed consent was obtained after a detailed explanation of the procedure including risks, benefits, and alternatives. Universal protocol was followed. A suitable skin site was prepped and draped in sterile fashion following ultrasound localization. An 18 gauge needle was advanced under ultrasound guidance into the gallbladder via transhepatic route and a 0.035 guidewire was used to place a 8 [place] cholecystostomy tube under fluoroscopic guidance. The catheter was sutured to the skin and the patient tolerated the procedure well. Thick bilious material was sent for laboratory analysis. The catheter was attached to gravity drainage. FINDINGS: Ultrasound image demonstrates distended gallbladder. Bilious fluid was sent for culture. Successful placement of transhepatic 8 [place] percutaneous cholecystostomy tube. Nm Hepatobiliary Scan W Ejection Fraction    Result Date: 4/20/2019  EXAMINATION: NUCLEAR MEDICINE HEPATOBILIARY SCINTIGRAPHY (HIDA SCAN). 4/20/2019 2:15 pm TECHNIQUE: Approximately 5.6 [id number] Mebrofenin (Choletec) was administered IV. Then, dynamic images of the abdomen were obtained in the anterior projection for 60 mins. A right lateral view was also obtained at 60 mins. Delayed images up to 4 hours were obtained.  COMPARISON: Ultrasound 04/19/2019 HISTORY: ORDERING SYSTEM PROVIDED HISTORY: CHOLECYSTITIS TECHNOLOGIST PROVIDED HISTORY: Ordering Physician Provided Reason for Exam: Cholecystitis Acuity: Unknown Type of Exam: Unknown FINDINGS: Prompt, homogenous uptake by the liver is noted with normal appearance of radiotracer excretion into the biliary system. Clearance of bloodpool activity appears appropriate. Normal small bowel activity is seen. Prominent activity within the common bile duct. The gallbladder is not visualized by the end of the exam.     Absent filling of the gallbladder consistent with acute cholecystitis. Physical Examination:        Physical Exam   Constitutional: She is oriented to person, place, and time. In no distress, states she is fatigued   HENT:   Mouth/Throat: Oropharynx is clear and moist.   Neck: Neck supple. Cardiovascular: Normal rate, regular rhythm and normal heart sounds. Pulmonary/Chest: Effort normal and breath sounds normal.   Abdominal: Soft. Bowel sounds are normal. There is tenderness (mild, in all quadrants, worst in RUQ). Musculoskeletal: She exhibits no edema or tenderness. Neurological: She is alert and oriented to person, place, and time. Skin: Skin is warm and dry. Nursing note and vitals reviewed.         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]     Septic shock ([address] [A41.9, R65.21]     Leukemoid reaction [D72.823]     Aspiration pneumonia of right lower lobe due to vomit ([address] [J69.0]     MRSA carrier [Z22.322]     Sepsis due to urinary tract infection ([address] [A41.9, N39.0] 04/11/2019    Cystitis [N30.90] 04/11/2019       Plan:        GI Bleed  Bright red stool still present   Lovenox and plavix held  Hb 7.2 -> 7.1  Transfuse for hb <7.0  GI consulted - appreciate recs   Patient refusing all intervention     Psych consult  Patient refusing all medical treatment   Will need to assess mentation due to mixed responses      Abdominal pain  Ileus, patient refusing all intervention    GI following      Sepsis secondary to e.coli, emphysematous cystitis - resolved   Urology consulted - appreciate recs               -dyer dced (4/30)  Gen surg consulted - appreciate recs  ID consulted - appreciate recs    Cholecystotomy tube 4/22   Lasix continued - monitor urine output          Pneumonia +MRSA - Resolved   Pulm consulted - appreciate recs              -wean to prednisone 10mg daily      Sinus Tachycardia, improved   Stable  Hx of CBA, bradycardia with pacemaker   Lopressor 12.5 BID   Cont. To monitor      Acute cholecystitis   General surgery consulted - appreciate recs   Surgical intervention: cholecystomy tube 4/22    Cholecystectomy in patient when medically stable      Gastroparesis   Per GI: protonix  Reglan started on 4/17, daily EKG  Considering systemic autonomic dysfunction as overlying diagnosis (gastroparesis, neurogenic bladder, hypotension/fluctuating Bps)      Oral Candidiasis - improved     Anemia, most likely 2/2 bleeding   Transfused               1 unit 4/14              2 unit 4/16              1 unit 5/3     Thrombocytopenia - improved  Platelets 243  hold lovenox  Cont. To monitor      Hypokalemia   Potassium sliding scale   4.6     Hypomagnesemia   Mg replacement   1.8     Hypophosphatemia   Sodium phosphate prn     Left knee remote distal tibia fx w/ osteopenia   Ortho to remove brace      Maintenance  Lovenox held   Dulera, Xopenex, Symbicort, Atrovent  Continue home meds trazodone, ASA, Plavix, Norvasc, Effexor, Spiriva     Dispo  PT/OT eval and treat   Social work [employer] planning     Patient refusing all surgical intervention. Discussed in length risks of no medical management. Patient voiced understanding.  However, will get psych consult to assess mentation.          Waldo Gutierrez MD  5/7/2019  9:15 AM       IM Attending    Pt seen and examined today   I have discussed the care of pt , including pertinent history and exam findings,  with the resident. I have reviewed the key elements of all parts of the encounter with the resident. I agree with the assessment, plan and orders as documented by the resident.     Will consult risk management   Pt refusing care   Electronically signed by Beatriz Ro MD on 5/7/2019 at 12:15 PM

## 2019-05-07 NOTE — CARE COORDINATION
ONGOING DISCHARGE PLAN:    LSW following for placement to [facility]-  Was originally denied and is now been applied and can take up to 14 days, to get a response. Patient had a Pysch consult today where patient who refused to continue with eval.     Please see Psych note from 5/7/19 - Psych is recommending  Neuro to do a mini Mental Status Exam and to get a Ethic consult     Yvette unit clerk called and left a message with Dipika spencer in Ethics to see patient. Patient is refusing treatment. But not ready to talk about hospice, she wants to  think about it. Palliative care following. Writer not confident that patient understands her illness or if she can make basic decisions. Patient very forgetful, She is agreeable one minute then the next refuses. Psych consult as example. Patient was advised about getting a psych consult and was agreeable for this , then when it takes place she refuses. This is just one example of trying to care for this patient. Will continue to follow for additional discharge needs.     Electronically signed by Lowella Schilder, RN on 5/7/2019 at 4:05 PM

## 2019-05-07 NOTE — PROGRESS NOTES
Infectious disease Consult Note      Patient: Di Bah  : 1950  Acct#:  [id number]     Date:  2019    Assessment:   Reported  drainage at cholecystectomy tube site   Anemia/GI bleed followed by GI  Intra-abdominal hernia with  small bowel obstruction refusing surgery and NG tube  Cholecystitis status post cholecystectomy tube  grew Candida non-albicans and one colony of ESBL Klebsiella on culture . finished ABXS course      Ecoli Emphysematous cystitis treated     Aspiration pneumonia of right lower lobe treated    Sepsis /Septic shock     Yeast growth on sputum culture suspect contamination     Leukocytosis resolved    Acute hypoxic resp failure resolved    MRSA carrier    Urinary retention     Anemia      PCN allergy                 Recommendations:     Monitor off ABXS for now  Follow CBC , renal function closely . Subjective:       History of Present Illness  Patient is a 77 y.o.  female admitted with Sepsis due to urinary tract infection   who is seen in consult for the same . She presented w dysuria and lower abd pain for around a week associated with vomiting ,was found hypotensive with leukocytosis . CT suggested emphysematous cystitis,possible gastric outlet obstruction. CXR showed a right lower infiltrate. High CA-19-9,CEA  Allergy to [facility] w SOB   Urine culture  grew ESCHERICHIA COLI resistant to Ampicillin ,sensitive to all other tested ABXS . Blood cultures negative . Mycoplasma IGM 0.97   YEAST MODERATE GROWTH on sputum culture   S/P EGD   with reported esophagitis. GB US Findings suggesting acute cholecystitis. HIIDA showed absent gallbladder filling. She was extubated on   Status post cholecystectomy tube  by interventional radiology,  cultures on  grew Candida non-albicans and one colony of ESBL Klebsiella. PICC line was placed   Tagged RBC scan  was negative .   Interval history :  She is complaining of abdominal pain, nausea, no vomiting. Less  drainage at cholecystectomy tube site   She is  still on TPN  HGB 7.1  No reported active bleed, refused flex sig yesterday  No fever    WBC normal.  CT abdomen showed no fluid collection ,Bowel obstruction which is suspected to be caused by an internal hernia. Past Medical History:   Diagnosis Date    Abnormal computed tomography of cervical spine     sclerotic bone appearance     CVA (cerebral vascular accident) ([address]     left  side weakness    GERD (gastroesophageal reflux disease)     Hypertension     Paraproteinemia     Weight loss       Past Surgical History:   Procedure Laterality Date    INTUBATION  4/14/2019         PACEMAKER PLACEMENT  07/2011    Pacemaker is Medtronic Revo (compatible). Leads placed in 1995 are NOT MRI compatible. Placed at [facility]. V's per Dr. Manolo Presley can not have an MRI.  UPPER GASTROINTESTINAL ENDOSCOPY N/A 4/16/2019    EGD ESOPHAGOGASTRODUODENOSCOPY @ BEDSIDE  ICU 2002 performed by Torsten Russo MD at [address]          Admission Meds  No current facility-administered medications on file prior to encounter. Current Outpatient Medications on File Prior to Encounter   Medication Sig Dispense Refill    oxyCODONE-acetaminophen (PERCOCET) 5-325 MG per tablet Take 1 tablet by mouth every 6 hours as needed for Pain.       senna-docusate (PERICOLACE) 8.6-50 MG per tablet Take 1 tablet by mouth 2 times daily      budesonide-formoterol (SYMBICORT) 160-4.5 MCG/ACT AERO Inhale 2 puffs into the lungs 2 times daily      sucralfate (CARAFATE) 1 GM/10ML suspension Take 1 g by mouth 4 times daily       traZODone (DESYREL) 50 MG tablet Take 50 mg by mouth nightly      tiZANidine (ZANAFLEX) 2 MG tablet Take 2 mg by mouth every 8 hours as needed (left knee)       aspirin 81 MG tablet Take 81 mg by mouth daily      clopidogrel (PLAVIX) 75 MG tablet TAKE 1 TABLET DAILY 30 tablet 2    DOCQLACE 100 MG capsule TAKE 1 CAPSULE BY MOUTH IN THE MORNING & IN THE EVENING -DRINK PLENTYOF WATER WHILE TAKING THIS MEDICINE 30 capsule 1    amLODIPine (NORVASC) 10 MG tablet Take 10 mg by mouth daily.  LORazepam (ATIVAN) 0.5 MG tablet Take 0.5 mg by mouth every 6 hours as needed for Anxiety.  therapeutic multivitamin-minerals (THERAGRAN-M) tablet Take 1 tablet by mouth daily.  omeprazole (PRILOSEC) 20 MG capsule Take 20 mg by mouth daily.  venlafaxine (EFFEXOR) 37.5 MG tablet Take 37.5 mg by mouth 3 times daily.  Misc. Devices [facility]) [address] wheelchair with left fupper extremity support  Dx: stroke with left hemiparesis. 1 each 0           Allergies  Allergies   Allergen Reactions    Penicillins Shortness Of Breath and Rash    Amoxicillin         Social   Social History     Tobacco Use    Smoking status: Current Some Day Smoker     Packs/day: 1.00     Years: 30.00     Pack years: 30.00    Smokeless tobacco: Never Used   Substance Use Topics    Alcohol use: Not Currently     Alcohol/week: 16.8 oz     Types: 28 Glasses of wine per week                History reviewed. No pertinent family history. Review of Systems  Other than above 12 systems reviewed negative . Tolerating antibiotics. Physical Exam  BP (!) 118/58   Pulse 116   Temp 97 °F (36.1 °C) (Oral)   Resp 18   Ht 5' (1.524 m)   Wt 111 lb 8.8 oz (50.6 kg)   SpO2 97%   BMI 21.79 kg/m²           General Appearance: alert ,NAD . Skin: warm and dry, no rash or erythema  Head: normocephalic and atraumatic  Eyes: pupils equal, round  Neck: neck supple and non tender   Pulmonary/Chest: coarse to auscultation bilaterally- no wheezes, rales or rhonchi  Cardiovascular: normal rate, regular rhythm, normal S1 and S2, no murmurs.   Abdomen: soft,mild upper abdomen tenderness  -distended,  no masses or organomegaly, gallbladder drain with  bile drainage   Extremities: no cyanosis, clubbing   Edema   PICC line in place       Data Review:    Recent Labs     05/05/19  7553 05/06/19 0527 05/07/19  0009 05/07/19  0509 05/07/19  1129   WBC 9.1  --  9.4  --   --  9.8  --    HGB 7.2*   < > 7.2*   < > 7.2* 7.1* 7.9*   HCT 21.7*   < > 21.7*   < > 23.6* 21.6* 24.8*   MCV 87.6  --  87.3  --   --  88.1  --      --  243  --   --  286  --     < > = values in this interval not displayed. Recent Labs     05/05/19 0619 05/06/19 0527 05/07/19  0509   * 132* 132*   K 4.5 4.2 4.6   CL 97* 97* 101   CO2 26 27 24   PHOS  --  3.3 3.1   BUN 21 21 17   CREATININE <0.40* <0.40* <0.40*     Recent Labs     05/05/19 0619 05/06/19 0527 05/07/19  0509   AST 18 15 19   ALT 15 15 17   BILITOT 0.35 0.31 0.28*   ALKPHOS 148* 161* 198*     No results for input(s): LIPASE, AMYLASE in the last 72 hours. Recent Labs     05/05/19 0619 05/06/19 0527 05/07/19  0509   PROTIME 15.3* 15.5* 15.5*   INR 1.2 1.2 1.2       Imaging Studies:                           All appropriate imaging studies and reports reviewed: Yes       Ct Abdomen Pelvis Wo Contrast Additional Contrast? Oral    Result Date: 4/14/2019  EXAMINATION: CT OF THE ABDOMEN AND PELVIS WITHOUT CONTRAST 4/14/2019 7:34 pm TECHNIQUE: CT of the abdomen and pelvis was performed without the administration of intravenous contrast. Multiplanar reformatted images are provided for review. Dose modulation, iterative reconstruction, and/or weight based adjustment of the mA/kV was utilized to reduce the radiation dose to as low as reasonably achievable. COMPARISON: 04/11/2019 HISTORY: ORDERING SYSTEM PROVIDED HISTORY: ABDOMINAL PAIN TECHNOLOGIST PROVIDED HISTORY: Water soluble contrast only please Ordering Physician Provided Reason for Exam: Abdominal pain - Vented patient. Contrast given via nurse through NG tube. Acuity: Unknown Type of Exam: Unknown Relevant Medical/Surgical History: Hx - Sepsis due to urinary tract infection. FINDINGS: Lower Chest: New moderate layering bilateral pleural effusions with bilateral lower lobe atelectasis.  Organs: Limited evaluation due lack of intravenous contrast.  Cholelithiasis redemonstrated. No gallbladder wall thickening or biliary ductal dilatation. Scattered tiny hypodense lesions in the liver are too small to characterize but statistically represent benign cysts or hemangiomas and appear unchanged. The pancreas, spleen, adrenal glands, and kidneys are unremarkable. There is no hydronephrosis or urinary tract calculus. GI/Bowel: The stomach is distended. Enteric tube is in place. No contrast is seen distal to the pylorus and there is contrast reflux into the distal esophagus. There is no evidence of bowel obstruction. The appendix is not definitely visualized. No focal pericecal inflammatory changes are evident. Pelvis: The urinary bladder is decompressed by Tran catheter. No pelvic mass is seen. Peritoneum/Retroperitoneum: Small amount of free fluid in the pelvic cavity. No free air or focal fluid collection. No abnormal lymph node. Normal abdominal aortic caliber. Moderate calcific atherosclerosis. Bones/Soft Tissues: No acute osseous abnormality. Diffuse anasarca. Moderate degenerative changes in the lumbar spine. 1. Distended, contrast filled stomach with reflux of contrast into the esophagus. No enteric contrast is seen distal to the pylorus suggesting delayed gastric emptying in the setting of ileus. 2. New moderate layering bilateral pleural effusions with bilateral lower lobe atelectasis. 3. Small amount of nonspecific free fluid in the pelvic cavity. 4. Anasarca. 5. Cholelithiasis.      Xr Chest (single View Frontal)    Result Date: 4/13/2019  EXAMINATION: SINGLE XRAY VIEW OF THE CHEST 4/13/2019 7:18 am COMPARISON: April 12, 2019 HISTORY: ORDERING SYSTEM PROVIDED HISTORY: dyspnea TECHNOLOGIST PROVIDED HISTORY: dyspnea Ordering Physician Provided Reason for Exam: dyspnea Acuity: Acute Type of Exam: Subsequent/Follow-up Additional signs and symptoms: dyspnea FINDINGS: A right IJ catheter is seen with its tip terminating at the superior cavoatrial junction. The left chest wall pacemaker and leads are stable. The cardiomediastinal silhouette is stable. There is interval increased opacity at the right lung base, may be related to atelectasis versus pneumonia. There is small atelectasis and mild pleural effusion at the left lung base. There is no pneumothorax. There is no acute osseous abnormality. Interval increased opacity at the right lung base, may be related to atelectasis versus pneumonia. Small atelectasis and mild pleural effusion at the left lung, new since the prior study. Xr Femur Left (min 2 Views)    Result Date: 3/27/2019  EXAMINATION: 4 XRAY VIEWS OF THE LEFT FEMUR; 2 XRAY VIEWS OF THE LEFT KNEE; 3 XRAY VIEWS OF THE LEFT TIBIA AND FIBULA 3/27/2019 7:00 pm COMPARISON: Left hip 11/14/2015. HISTORY: ORDERING SYSTEM PROVIDED HISTORY: pain TECHNOLOGIST PROVIDED HISTORY: pain Ordering Physician Provided Reason for Exam: pt twisted wrong, lt knee pain, unable to straighten knee. Acuity: Acute Type of Exam: Initial FINDINGS: Left femur, four views: The bones are diffusely osteopenic. No acute fracture deformity. The hip joint is maintained. The femoral head projects within the acetabulum. No focal soft tissue abnormality is seen. Left knee, two views: The knee is flexed. No acute osseous abnormality. No joint effusion. Joint spaces are not optimally profiled but no significant arthritic changes are apparent. Left tib-fib, three views: There is evidence of a remote healed distal tibia fracture. No acute fracture or dislocation is seen. No focal soft tissue abnormality. No acute osseous abnormality of the left femur. No acute osseous abnormality of the left knee. No acute osseous abnormality of the left tib-fib. Healed remote distal tibia fracture. Marked osteopenia, likely due to disuse.      Xr Knee Left (1-2 Views)    Result Date: 3/27/2019  EXAMINATION: 4 XRAY VIEWS OF 3/27/2019 7:00 pm COMPARISON: Left hip 11/14/2015. HISTORY: ORDERING SYSTEM PROVIDED HISTORY: pain TECHNOLOGIST PROVIDED HISTORY: pain Ordering Physician Provided Reason for Exam: pt twisted wrong, lt knee pain, unable to straighten knee. Acuity: Acute Type of Exam: Initial FINDINGS: Left femur, four views: The bones are diffusely osteopenic. No acute fracture deformity. The hip joint is maintained. The femoral head projects within the acetabulum. No focal soft tissue abnormality is seen. Left knee, two views: The knee is flexed. No acute osseous abnormality. No joint effusion. Joint spaces are not optimally profiled but no significant arthritic changes are apparent. Left tib-fib, three views: There is evidence of a remote healed distal tibia fracture. No acute fracture or dislocation is seen. No focal soft tissue abnormality. No acute osseous abnormality of the left femur. No acute osseous abnormality of the left knee. No acute osseous abnormality of the left tib-fib. Healed remote distal tibia fracture. Marked osteopenia, likely due to disuse. Xr Abdomen (kub) (single Ap View)    Result Date: 4/14/2019  EXAMINATION: SINGLE SUPINE XRAY VIEW(S) OF THE ABDOMEN 4/14/2019 7:39 am COMPARISON: CT abdomen and pelvis  film from 11 April 2019 HISTORY: [address]: Abd Distention TECHNOLOGIST PROVIDED HISTORY: Abd Distention Ordering Physician Provided Reason for Exam: Abdominal distention. Pt was moving around in the bed. Best films at present time. Acuity: Acute Type of Exam: Initial Additional signs and symptoms: Abdominal distention. Pt was moving around in the bed. Best films at present time. FINDINGS: Portable view time stamped at 748 hours demonstrates an intestinal tube terminating in the midportion of a gaseous Spike dilated stomach. Densities are present over the stomach likely medication. Bipolar pacemaker is in situ with intact leads. Heart size is top-normal, stable. Gaseous distension of the stomach and loop of bowel in the upper mid abdomen is noted but there is gas and fecal material in the rectum. Gastric outlet obstruction or proximal small bowel partial obstruction is suspected. No free air is noted. Midline city is present over the pelvis likely a monitor or CT small bore catheter. Vascular calcification is present in the pelvis. Persistent preferential gaseous distension of the stomach although there is some gas in the upper abdomen and gas and fecal material present in the rectosigmoid. Findings suggest gastric outlet obstruction. Ct Abdomen Pelvis W Iv Contrast    Result Date: 4/11/2019  EXAMINATION: CT OF THE ABDOMEN AND PELVIS WITH CONTRAST 4/11/2019 5:14 pm TECHNIQUE: CT of the abdomen and pelvis was performed with the administration of intravenous contrast. Multiplanar reformatted images are provided for review. Dose modulation, iterative reconstruction, and/or weight based adjustment of the mA/kV was utilized to reduce the radiation dose to as low as reasonably achievable. COMPARISON: None. HISTORY: ORDERING SYSTEM PROVIDED HISTORY: Abdominal pain TECHNOLOGIST PROVIDED HISTORY: IV Only Contrast Ordering Physician Provided Reason for Exam: patient c/o abd pain for an hour FINDINGS: Lower Chest: Trace pleural fluid bilaterally is noted. Indwelling cardiac pacemaker is present. Heart size is normal.  Coronary artery calcifications are evident. The esophagus is significantly dilated and fluid-filled. No paraesophageal adenopathy is evident. Organs: The spleen, pancreas, and adrenals are unremarkable. The liver contains hypodensities, likely cysts. The gallbladder is distended and contains a solitary gallstone. The kidneys excrete contrast bilaterally. Extrarenal collecting systems are noted. The ureters are mildly dilated down to the urinary bladder without evidence of intraluminal or ureteral obstructing calculi.  GI/Bowel: Marked distention of the stomach is noted; this continues to the pylorus with appearance suggesting partial gastric outlet obstruction. Fluid is present in some small bowel loops and colon distal to the stomach. No small bowel obstruction. Pelvis: Marked distention of the urinary bladder is noted. 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 Distended ureters may be related to reflux or infection. No free pelvic fluid, pelvic or inguinal adenopathy is noted. Peritoneum/Retroperitoneum: No aortic aneurysm. Mild to moderate plaque is noted in the infrarenal abdominal aorta and both proximal iliac arteries. Shotty lymph nodes are present around the aorta in the upper abdomen. No mesenteric adenopathy is noted. Bones/Soft Tissues: Degenerative changes are present in the hips and lower lumbar facets. Estimated biologic radiation dose for this procedure:258.77 mGy/cm2.     1. Dilatation of the thoracic esophagus filled with fluid. No obstructive process is noted. No adjacent enlarged lymph nodes. 2. Trace pleural fluid. 3. Marked distention of the stomach. This appears to extend to the pylorus. Partial gastric outlet obstruction is not excluded. 4. Bilateral dilated ureters without obstructing calculi. Urinary bladder is markedly distended with bladder wall air suggesting emphysematous cystitis. Dilatation of the ureters may be related to reflux or infection. 5. Atherosclerotic disease. 6. Other findings as above. Critical results were called by Dr. Millie Collet, MD to [address] on 4/11/2019 at 17:37.      Xr Chest Portable    Result Date: 4/16/2019  EXAMINATION: SINGLE XRAY VIEW OF THE CHEST 4/16/2019 6:54 am COMPARISON: 04/15/2019, 610 hours HISTORY: ORDERING SYSTEM PROVIDED HISTORY: ETT placement TECHNOLOGIST PROVIDED HISTORY: ETT placement Ordering Physician Provided Reason for Exam: on vent Acuity: Acute Type of Exam: Initial 68-year-old female on ventilator; check endotracheal tube placement FINDINGS: Portable AP upright view of the chest. Endotracheal tube distal tip overlying the mid trachea approximately 4.1 cm above the level of the ron. Enteric tube traverses the GE junction with distal tip excluded from the field of view. Left subclavian approach cardiac pacemaker device distal lead tips relatively stable in position. Right internal jugular approach central venous catheter distal tip overlying the high right atrium, stable. Cardiac monitor leads overlie the chest. Atherosclerotic calcification of the thoracic aorta. Slight stable volume loss of the left hemithorax. No pneumothorax. No free air. Dense retrocardiac/left basilar airspace consolidation and small left-sided pleural effusion. Stable mild focal opacity at the right mid lung zone. Underlying COPD. Stable mild pulmonary vascular congestion and left-sided predominant parahilar opacity. Visualized osseous structures remain unchanged. 1. Stable multifocal airspace disease as detailed above with dense retrocardiac/left basilar airspace consolidation and small left-sided pleural effusion. Mild pulmonary vascular congestion. Findings may represent edema or multifocal pneumonia. 2. Underlying COPD. 3. Tubes and line as detailed above. Xr Chest Portable    Result Date: 4/15/2019  EXAMINATION: SINGLE XRAY VIEW OF THE CHEST 4/15/2019 6:47 am COMPARISON: 14 April 2019 HISTORY: ORDERING SYSTEM PROVIDED HISTORY: ETT placement TECHNOLOGIST PROVIDED HISTORY: ETT placement Ordering Physician Provided Reason for Exam: on vent Acuity: Acute Type of Exam: Initial FINDINGS: AP portable view of the chest time stamped at 612 hours demonstrates overlying cardiac monitoring electrodes. Endotracheal tube terminates 4 cm above the ron. Bipolar pacemaker enters from the left with intact leads in appropriate positions. Intestinal tube extends beyond the fundus of the stomach, tip not included.   Right internal jugular catheter terminates at

## 2019-05-07 NOTE — PROGRESS NOTES
Physical Therapy  Facility/Department: [facility] PROGRESSIVE CARE  Daily Treatment Note  NAME: Sue Kehr  : 1950  MRN: [id number]    Date of Service: 2019    Discharge Recommendations:  ECF with PT        Patient Diagnosis(es): The primary encounter diagnosis was Urinary retention. Diagnoses of Emphysematous cystitis and Septicemia ([facility] [address] were also pertinent to this visit. has a past medical history of Abnormal computed tomography of cervical spine, CVA (cerebral vascular accident) ([facility] [address] GERD (gastroesophageal reflux disease), Hypertension, Paraproteinemia, and Weight loss. has a past surgical history that includes pacemaker placement (2011); intubation (2019); and Upper gastrointestinal endoscopy (N/A, 2019). Restrictions  Restrictions/Precautions  Restrictions/Precautions: Fall Risk  Implants present? : Pacemaker  Position Activity Restriction  Other position/activity restrictions: LLE cast is removed   Subjective   General  Chart Reviewed: Yes  Additional Pertinent Hx: admitted 19 due to abdominal pain  Response To Previous Treatment: Patient with no complaints from previous session. Family / Caregiver Present: No  Referring Practitioner: DR Eloy Lobato  Subjective  Subjective: Pt is moaning out in pain and discomfort upon arrival. Pt is agreeable to get repositioned only despite education on the importance of ROM ex's. General Comment  Comments: VICKY koehler's pt for PT. Pain Screening  Patient Currently in Pain: Yes  Vital Signs  Patient Currently in Pain: Yes  Oxygen Therapy  O2 Device: None (Room air)       Orientation  Orientation  Overall Orientation Status: Within Functional Limits  Objective   Bed mobility  Rolling to Left: Maximum assistance  Rolling to Right: Maximum assistance  Comment: Pt able to roll to L with Max A. Pt agreeable to place pillow under R hip area to help decrease risk for bed sores.  Pt educated in the importance of bed mobility/ROM ex's however refuses any other treatment. Pt demos heel slide x1 to assist with rolling. Pt educated in breathing technique with little to fair carryover. Assessment   Body structures, Functions, Activity limitations: Decreased functional mobility ; Decreased ADL status; Decreased strength; Increased Pain  Assessment: Pt would benefit from additional PT to increase strength, endurance, and indpendence with functional mobility. Treatment Diagnosis: weakness all 4's difficulty walking  Specific instructions for Next Treatment: progress to mobility as able  Prognosis: Good  Patient Education: POC, bed mobility, breathing technique.    REQUIRES PT FOLLOW UP: Yes  Activity Tolerance  Activity Tolerance: Patient limited by pain  Activity Tolerance: Pt refuses further tx/ROM        Goals  Short term goals  Time Frame for Short term goals: 10 visits  Short term goal 1: improve strength RUE/RLE to 4/5 to assist in bed mobility and transfers  Short term goal 2: improve bed mobility to minx1  Short term goal 3: improve transfers to minx1  Short term goal 4: progress to Gait and/or use of wheelchair for mobility  Patient Goals   Patient goals : return home    Plan    Plan  Times per week: 5-7x/wk  Times per day: Daily  Specific instructions for Next Treatment: progress to mobility as able  Current Treatment Recommendations: ROM, Strengthening, Functional Mobility Training, Transfer Training  Plan Comment: to continue PT per POC  Safety Devices  Type of devices: Call light within reach, Left in bed  Restraints  Initially in place: No  Restraints: B wrist restraint     Therapy Time   Individual Concurrent Group Co-treatment   Time In 0837         Time Out 0845         Minutes [address], [place]

## 2019-05-07 NOTE — PLAN OF CARE
Problem: Risk for Impaired Skin Integrity  Goal: Tissue integrity - skin and mucous membranes  Description  Structural intactness and normal physiological function of skin and  mucous membranes. Outcome: Ongoing  Note:   Patient tolerates repositioning. Must encourage, as she does not want to move. Barrier cream applied mepilex applied to buttocks. Problem: Falls - Risk of:  Goal: Will remain free from falls  Description  Will remain free from falls  Outcome: Ongoing  Note:   No attempts to get up per self, fall precautions continued  Goal: Absence of physical injury  Description  Absence of physical injury  Outcome: Ongoing     Problem: Infection, Septic Shock:  Goal: Will show no infection signs and symptoms  Description  Will show no infection signs and symptoms  Outcome: Ongoing  Note:   Continue to monitor labs and vital signs for signs of infection     Problem: Tissue Perfusion, Altered:  Goal: Circulatory function within specified parameters  Description  Circulatory function within specified parameters  Outcome: Ongoing     Problem: Pain:  Goal: Pain level will decrease  Description  Pain level will decrease  Outcome: Ongoing  Note:   Medicated as ordered and positioned for comfort.  Patient slept comfortably for periods of time  Goal: Control of acute pain  Description  Control of acute pain  Outcome: Ongoing  Goal: Control of chronic pain  Description  Control of chronic pain  Outcome: Ongoing     Problem: Nutrition  Goal: Optimal nutrition therapy  Description       Outcome: Ongoing

## 2019-05-08 ENCOUNTER — APPOINTMENT (OUTPATIENT)
Dept: GENERAL RADIOLOGY | Age: 69
DRG: 853 | End: 2019-05-08
Payer: COMMERCIAL

## 2019-05-08 LAB
ABSOLUTE BANDS #: 1.33 K/UL (ref 0–1)
ABSOLUTE EOS #: 0 K/UL (ref 0–0.4)
ABSOLUTE IMMATURE GRANULOCYTE: ABNORMAL K/UL (ref 0–0.3)
ABSOLUTE LYMPH #: 1.94 K/UL (ref 1–4.8)
ABSOLUTE MONO #: 0.73 K/UL (ref 0.1–1.3)
ALBUMIN SERPL-MCNC: 1.9 G/DL (ref 3.5–5.2)
ALBUMIN/GLOBULIN RATIO: ABNORMAL (ref 1–2.5)
ALP BLD-CCNC: 178 U/L (ref 35–104)
ALT SERPL-CCNC: 15 U/L (ref 5–33)
ANION GAP SERPL CALCULATED.3IONS-SCNC: 9 MMOL/L (ref 9–17)
AST SERPL-CCNC: 16 U/L
BANDS: 11 % (ref 0–10)
BASOPHILS # BLD: 0 % (ref 0–2)
BASOPHILS ABSOLUTE: 0 K/UL (ref 0–0.2)
BILIRUB SERPL-MCNC: 0.26 MG/DL (ref 0.3–1.2)
BUN BLDV-MCNC: 19 MG/DL (ref 8–23)
BUN/CREAT BLD: ABNORMAL (ref 9–20)
C-REACTIVE PROTEIN: 88.9 MG/L (ref 0–5)
CALCIUM SERPL-MCNC: 7.3 MG/DL (ref 8.6–10.4)
CHLORIDE BLD-SCNC: 103 MMOL/L (ref 98–107)
CO2: 23 MMOL/L (ref 20–31)
CREAT SERPL-MCNC: <0.4 MG/DL (ref 0.5–0.9)
DIFFERENTIAL TYPE: ABNORMAL
EOSINOPHILS RELATIVE PERCENT: 0 % (ref 0–4)
GFR AFRICAN AMERICAN: ABNORMAL ML/MIN
GFR NON-AFRICAN AMERICAN: ABNORMAL ML/MIN
GFR SERPL CREATININE-BSD FRML MDRD: ABNORMAL ML/MIN/{1.73_M2}
GFR SERPL CREATININE-BSD FRML MDRD: ABNORMAL ML/MIN/{1.73_M2}
GLUCOSE BLD-MCNC: 119 MG/DL (ref 65–105)
GLUCOSE BLD-MCNC: 120 MG/DL (ref 70–99)
GLUCOSE BLD-MCNC: 125 MG/DL (ref 65–105)
GLUCOSE BLD-MCNC: 130 MG/DL (ref 65–105)
HCT VFR BLD CALC: 22.3 % (ref 36–46)
HCT VFR BLD CALC: 22.4 % (ref 36–46)
HCT VFR BLD CALC: 22.5 % (ref 36–46)
HCT VFR BLD CALC: 23.8 % (ref 36–46)
HEMOGLOBIN: 6.2 G/DL (ref 12–16)
HEMOGLOBIN: 7.2 G/DL (ref 12–16)
HEMOGLOBIN: 7.2 G/DL (ref 12–16)
HEMOGLOBIN: 7.8 G/DL (ref 12–16)
IMMATURE GRANULOCYTES: ABNORMAL %
INR BLD: 1.3
LYMPHOCYTES # BLD: 16 % (ref 24–44)
MAGNESIUM: 1.6 MG/DL (ref 1.6–2.6)
MCH RBC QN AUTO: 28.4 PG (ref 26–34)
MCHC RBC AUTO-ENTMCNC: 32 G/DL (ref 31–37)
MCV RBC AUTO: 88.5 FL (ref 80–100)
MONOCYTES # BLD: 6 % (ref 1–7)
MORPHOLOGY: ABNORMAL
NRBC AUTOMATED: ABNORMAL PER 100 WBC
PDW BLD-RTO: 17.2 % (ref 11.5–14.9)
PHOSPHORUS: 3.1 MG/DL (ref 2.6–4.5)
PLATELET # BLD: 343 K/UL (ref 150–450)
PLATELET ESTIMATE: ABNORMAL
PMV BLD AUTO: 8.4 FL (ref 6–12)
POTASSIUM SERPL-SCNC: 3.8 MMOL/L (ref 3.7–5.3)
PROCALCITONIN: 0.07 NG/ML
PROTHROMBIN TIME: 16.1 SEC (ref 11.8–14.6)
RBC # BLD: 2.53 M/UL (ref 4–5.2)
RBC # BLD: ABNORMAL 10*6/UL
SEDIMENTATION RATE, ERYTHROCYTE: 84 MM (ref 0–20)
SEG NEUTROPHILS: 67 % (ref 36–66)
SEGMENTED NEUTROPHILS ABSOLUTE COUNT: 8.1 K/UL (ref 1.3–9.1)
SODIUM BLD-SCNC: 135 MMOL/L (ref 135–144)
TOTAL PROTEIN: 4.9 G/DL (ref 6.4–8.3)
WBC # BLD: 12.1 K/UL (ref 3.5–11)
WBC # BLD: ABNORMAL 10*3/UL

## 2019-05-08 PROCEDURE — 6370000000 HC RX 637 (ALT 250 FOR IP): Performed by: INTERNAL MEDICINE

## 2019-05-08 PROCEDURE — 2500000003 HC RX 250 WO HCPCS: Performed by: SURGERY

## 2019-05-08 PROCEDURE — 94640 AIRWAY INHALATION TREATMENT: CPT

## 2019-05-08 PROCEDURE — 6360000002 HC RX W HCPCS: Performed by: INTERNAL MEDICINE

## 2019-05-08 PROCEDURE — 2500000003 HC RX 250 WO HCPCS: Performed by: INTERNAL MEDICINE

## 2019-05-08 PROCEDURE — 2060000000 HC ICU INTERMEDIATE R&B

## 2019-05-08 PROCEDURE — 74018 RADEX ABDOMEN 1 VIEW: CPT

## 2019-05-08 PROCEDURE — 2580000003 HC RX 258: Performed by: INTERNAL MEDICINE

## 2019-05-08 PROCEDURE — 86920 COMPATIBILITY TEST SPIN: CPT

## 2019-05-08 PROCEDURE — 84145 PROCALCITONIN (PCT): CPT

## 2019-05-08 PROCEDURE — 85651 RBC SED RATE NONAUTOMATED: CPT

## 2019-05-08 PROCEDURE — 84100 ASSAY OF PHOSPHORUS: CPT

## 2019-05-08 PROCEDURE — 86850 RBC ANTIBODY SCREEN: CPT

## 2019-05-08 PROCEDURE — 86140 C-REACTIVE PROTEIN: CPT

## 2019-05-08 PROCEDURE — 36415 COLL VENOUS BLD VENIPUNCTURE: CPT

## 2019-05-08 PROCEDURE — 2580000003 HC RX 258: Performed by: STUDENT IN AN ORGANIZED HEALTH CARE EDUCATION/TRAINING PROGRAM

## 2019-05-08 PROCEDURE — 86900 BLOOD TYPING SEROLOGIC ABO: CPT

## 2019-05-08 PROCEDURE — 85014 HEMATOCRIT: CPT

## 2019-05-08 PROCEDURE — 94761 N-INVAS EAR/PLS OXIMETRY MLT: CPT

## 2019-05-08 PROCEDURE — 99232 SBSQ HOSP IP/OBS MODERATE 35: CPT | Performed by: INTERNAL MEDICINE

## 2019-05-08 PROCEDURE — 80053 COMPREHEN METABOLIC PANEL: CPT

## 2019-05-08 PROCEDURE — 86901 BLOOD TYPING SEROLOGIC RH(D): CPT

## 2019-05-08 PROCEDURE — P9016 RBC LEUKOCYTES REDUCED: HCPCS

## 2019-05-08 PROCEDURE — 82947 ASSAY GLUCOSE BLOOD QUANT: CPT

## 2019-05-08 PROCEDURE — 85025 COMPLETE CBC W/AUTO DIFF WBC: CPT

## 2019-05-08 PROCEDURE — 83735 ASSAY OF MAGNESIUM: CPT

## 2019-05-08 PROCEDURE — APPNB30 APP NON BILLABLE TIME 0-30 MINS: Performed by: NURSE PRACTITIONER

## 2019-05-08 PROCEDURE — 85018 HEMOGLOBIN: CPT

## 2019-05-08 PROCEDURE — 99231 SBSQ HOSP IP/OBS SF/LOW 25: CPT | Performed by: INTERNAL MEDICINE

## 2019-05-08 PROCEDURE — 85610 PROTHROMBIN TIME: CPT

## 2019-05-08 PROCEDURE — 97535 SELF CARE MNGMENT TRAINING: CPT

## 2019-05-08 RX ORDER — 0.9 % SODIUM CHLORIDE 0.9 %
250 INTRAVENOUS SOLUTION INTRAVENOUS ONCE
Status: COMPLETED | OUTPATIENT
Start: 2019-05-08 | End: 2019-05-09

## 2019-05-08 RX ORDER — OXYMETAZOLINE HYDROCHLORIDE 0.05 G/100ML
2 SPRAY NASAL 2 TIMES DAILY
Status: DISPENSED | OUTPATIENT
Start: 2019-05-08 | End: 2019-05-11

## 2019-05-08 RX ORDER — LEVALBUTEROL 1.25 MG/.5ML
1.25 SOLUTION, CONCENTRATE RESPIRATORY (INHALATION) 4 TIMES DAILY
Status: DISCONTINUED | OUTPATIENT
Start: 2019-05-08 | End: 2019-05-10

## 2019-05-08 RX ADMIN — Medication 2 PUFF: at 08:19

## 2019-05-08 RX ADMIN — FENTANYL CITRATE 50 MCG: 50 INJECTION INTRAMUSCULAR; INTRAVENOUS at 00:44

## 2019-05-08 RX ADMIN — MORPHINE SULFATE 2 MG: 2 INJECTION, SOLUTION INTRAMUSCULAR; INTRAVENOUS at 11:02

## 2019-05-08 RX ADMIN — PANTOPRAZOLE SODIUM 40 MG: 40 TABLET, DELAYED RELEASE ORAL at 05:27

## 2019-05-08 RX ADMIN — OXYMETAZOLINE HCL 2 SPRAY: 0.05 SPRAY NASAL at 15:27

## 2019-05-08 RX ADMIN — CALCIUM GLUCONATE: 94 INJECTION, SOLUTION INTRAVENOUS at 18:31

## 2019-05-08 RX ADMIN — OXYCODONE AND ACETAMINOPHEN 1 TABLET: 5; 325 TABLET ORAL at 05:27

## 2019-05-08 RX ADMIN — LORAZEPAM 0.5 MG: 2 INJECTION INTRAMUSCULAR; INTRAVENOUS at 17:08

## 2019-05-08 RX ADMIN — Medication 10 ML: at 08:20

## 2019-05-08 RX ADMIN — LEVALBUTEROL 1.25 MG: 1.25 SOLUTION, CONCENTRATE RESPIRATORY (INHALATION) at 07:12

## 2019-05-08 RX ADMIN — SODIUM CHLORIDE 250 ML: 0.9 INJECTION, SOLUTION INTRAVENOUS at 22:15

## 2019-05-08 RX ADMIN — FENTANYL CITRATE 50 MCG: 50 INJECTION INTRAMUSCULAR; INTRAVENOUS at 03:19

## 2019-05-08 RX ADMIN — LORAZEPAM 0.5 MG: 2 INJECTION INTRAMUSCULAR; INTRAVENOUS at 08:14

## 2019-05-08 RX ADMIN — LEVALBUTEROL 1.25 MG: 1.25 SOLUTION, CONCENTRATE RESPIRATORY (INHALATION) at 15:01

## 2019-05-08 RX ADMIN — FENTANYL CITRATE 50 MCG: 50 INJECTION INTRAMUSCULAR; INTRAVENOUS at 13:50

## 2019-05-08 RX ADMIN — FUROSEMIDE 40 MG: 10 INJECTION, SOLUTION INTRAMUSCULAR; INTRAVENOUS at 08:14

## 2019-05-08 RX ADMIN — I.V. FAT EMULSION 100 ML: 20 EMULSION INTRAVENOUS at 18:31

## 2019-05-08 RX ADMIN — LEVALBUTEROL 1.25 MG: 1.25 SOLUTION, CONCENTRATE RESPIRATORY (INHALATION) at 10:55

## 2019-05-08 RX ADMIN — IPRATROPIUM BROMIDE 0.5 MG: 0.5 SOLUTION RESPIRATORY (INHALATION) at 07:11

## 2019-05-08 RX ADMIN — Medication 10 ML: at 13:50

## 2019-05-08 RX ADMIN — ONDANSETRON 4 MG: 2 INJECTION INTRAMUSCULAR; INTRAVENOUS at 21:43

## 2019-05-08 RX ADMIN — IPRATROPIUM BROMIDE 0.5 MG: 0.5 SOLUTION RESPIRATORY (INHALATION) at 10:55

## 2019-05-08 RX ADMIN — MORPHINE SULFATE 2 MG: 2 INJECTION, SOLUTION INTRAMUSCULAR; INTRAVENOUS at 21:37

## 2019-05-08 RX ADMIN — IPRATROPIUM BROMIDE 0.5 MG: 0.5 SOLUTION RESPIRATORY (INHALATION) at 15:01

## 2019-05-08 ASSESSMENT — ENCOUNTER SYMPTOMS
BACK PAIN: 0
BLOOD IN STOOL: 0
CHOKING: 0
ABDOMINAL PAIN: 0
NAUSEA: 0
WHEEZING: 0
DIARRHEA: 0
SHORTNESS OF BREATH: 0
VOMITING: 0
COUGH: 0
CONSTIPATION: 0

## 2019-05-08 ASSESSMENT — PAIN SCALES - GENERAL
PAINLEVEL_OUTOF10: 10
PAINLEVEL_OUTOF10: 0
PAINLEVEL_OUTOF10: 10
PAINLEVEL_OUTOF10: 7
PAINLEVEL_OUTOF10: 0
PAINLEVEL_OUTOF10: 10
PAINLEVEL_OUTOF10: 10
PAINLEVEL_OUTOF10: 0
PAINLEVEL_OUTOF10: 10
PAINLEVEL_OUTOF10: 0
PAINLEVEL_OUTOF10: 10

## 2019-05-08 ASSESSMENT — PAIN DESCRIPTION - LOCATION
LOCATION: GENERALIZED
LOCATION: GENERALIZED
LOCATION: BACK

## 2019-05-08 ASSESSMENT — PAIN DESCRIPTION - PAIN TYPE
TYPE: CHRONIC PAIN

## 2019-05-08 NOTE — DISCHARGE SUMMARY
[address]    Discharge Summary     Patient ID: Will Steiner  :  1950   MRN: [id number]     ACCOUNT:  [de-identified]   Patient's PCP: Arianne Ponce DO  Admit Date: 2019   Discharge Date: 2019     Length of Stay: 27  Code Status:  Limited  Admitting Physician: Daquan Colmenares MD  Discharge Physician: Alan Wen MD     Active Discharge Diagnoses:       Primary Problem  GI bleed      [facility] Problems    Diagnosis Date Noted    Small bowel obstruction ([address] [K56.609]     Anxiety disorder [F41.9]     Noncompliance [Z91.19]     Anemia [D64.9]     GI bleed [K92.2] 2019    Rectal bleeding [K62.5]     Centrilobular emphysema ([address] [J43.2] 2019    CRP elevated [R79.82]     Elevated procalcitonin [R79.89]     Bandemia [D72.825]     Cholecystitis [K81.9]     Abdominal distention [R14.0]     Elevated CEA [R97.0]     Elevated CA 19-9 level [R97.8]     Urinary retention [R33.9]     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 female who was admitted for the management of   GI bleed , presented to ER with Abdominal Pain    In the ED: pt was septic, Ucx showed e.coli. CT showed emphysematous cystitis , bilateral hydronephrosis 2/2 obstruction and MRSA pneumonia. Pt had cast on left LE placed at [facility] the week prior. On admission: in ICU. Urology consulted - dyer placed. Pulm, surgery, GI and ID consulted. Pressors and abx administered.  She required intubation due to respiratory distress, NGT placed, pressors administered. MRSA positive - vanc started, on levaquin. TPN per GI. Pt found to have ileus. EGD performed - no obvious gastric outlet obstruction. Candida found on sputum cx. Diflucan started. HIDA scan performed - suggestive of cholecystits. Pt not stable for cholecystectomy. Vanc dced. Levaquin and Flagyl cont. Pt extubated. Pt in heart failure - amiodarone adminsitered. Cholecystostomy tube placed. Ileus was resolving. Levaquin dced. On Flagyl and aztreonam. Amio switched to cardizem. Pressors weaned off. Abx switched to oral.  Transferred to step down. Fresh frozen plasma transfused. Pt symptoms improved. She then had GI bleed per rectum - NM gi scan was neg for active bleed. Received blood transfusion. Hb improved. CT abdo done - ileus. Pt refusing further surgical/medical intervention. Cast removed. US doppler LE - negative. Patient status changed to limited - no intubation. Pt dced to facility. Significant therapeutic interventions: intubation, NGT, blood transfusion, cholecystostomy tube, FFP    Ct Abdomen Pelvis Wo Contrast Additional Contrast? Oral    Result Date: 4/14/2019  EXAMINATION: CT OF THE ABDOMEN AND PELVIS WITHOUT CONTRAST 4/14/2019 7:34 pm TECHNIQUE: CT of the abdomen and pelvis was performed without the administration of intravenous contrast. Multiplanar reformatted images are provided for review. Dose modulation, iterative reconstruction, and/or weight based adjustment of the mA/kV was utilized to reduce the radiation dose to as low as reasonably achievable. COMPARISON: 04/11/2019 HISTORY: ORDERING SYSTEM PROVIDED HISTORY: ABDOMINAL PAIN TECHNOLOGIST PROVIDED HISTORY: Water soluble contrast only please Ordering Physician Provided Reason for Exam: Abdominal pain - Vented patient. Contrast given via nurse through NG tube. Acuity: Unknown Type of Exam: Unknown Relevant Medical/Surgical History: Hx - Sepsis due to urinary tract infection.  FINDINGS: Lower Chest: New moderate layering bilateral pleural effusions with bilateral lower lobe atelectasis. Organs: Limited evaluation due lack of intravenous contrast.  Cholelithiasis redemonstrated. No gallbladder wall thickening or biliary ductal dilatation. Scattered tiny hypodense lesions in the liver are too small to characterize but statistically represent benign cysts or hemangiomas and appear unchanged. The pancreas, spleen, adrenal glands, and kidneys are unremarkable. There is no hydronephrosis or urinary tract calculus. GI/Bowel: The stomach is distended. Enteric tube is in place. No contrast is seen distal to the pylorus and there is contrast reflux into the distal esophagus. There is no evidence of bowel obstruction. The appendix is not definitely visualized. No focal pericecal inflammatory changes are evident. Pelvis: The urinary bladder is decompressed by Tran catheter. No pelvic mass is seen. Peritoneum/Retroperitoneum: Small amount of free fluid in the pelvic cavity. No free air or focal fluid collection. No abnormal lymph node. Normal abdominal aortic caliber. Moderate calcific atherosclerosis. Bones/Soft Tissues: No acute osseous abnormality. Diffuse anasarca. Moderate degenerative changes in the lumbar spine. 1. Distended, contrast filled stomach with reflux of contrast into the esophagus. No enteric contrast is seen distal to the pylorus suggesting delayed gastric emptying in the setting of ileus. 2. New moderate layering bilateral pleural effusions with bilateral lower lobe atelectasis. 3. Small amount of nonspecific free fluid in the pelvic cavity. 4. Anasarca. 5. Cholelithiasis.      Xr Chest (single View Frontal)    Result Date: 5/2/2019  EXAMINATION: SINGLE XRAY VIEW OF THE CHEST 5/2/2019 6:34 am COMPARISON: 29 April 2019 HISTORY: ORDERING SYSTEM PROVIDED HISTORY: COPD TECHNOLOGIST PROVIDED HISTORY: COPD Ordering Physician Provided Reason for Exam: COPD Acuity: Acute Type of Exam: Initial FINDINGS: AP portable view of the chest time stamped at 630 hours demonstrates stable cardiac size. Overlying cardiac monitoring electrodes are present. Right-sided PICC line terminates in the right atrium. Bipolar pacemaker enters from the left with intact leads in appropriate positions. There is been no significant interval change in bilateral interstitial opacities, representing interval change since 2015. This may be related to worsening interstitial disease or superimposed venous congestion. Bilateral effusions are noted with bibasilar opacities, either atelectasis or edema. Continued bilateral effusions, bibasilar opacities and bilateral interstitial opacities in the upper lobes. Findings may be related to worsening interstitial disease and edema. Airspace disease is not excluded. Xr Chest (single View Frontal)    Result Date: 4/13/2019  EXAMINATION: SINGLE XRAY VIEW OF THE CHEST 4/13/2019 7:18 am COMPARISON: April 12, 2019 HISTORY: ORDERING SYSTEM PROVIDED HISTORY: dyspnea TECHNOLOGIST PROVIDED HISTORY: dyspnea Ordering Physician Provided Reason for Exam: dyspnea Acuity: Acute Type of Exam: Subsequent/Follow-up Additional signs and symptoms: dyspnea FINDINGS: A right IJ catheter is seen with its tip terminating at the superior cavoatrial junction. The left chest wall pacemaker and leads are stable. The cardiomediastinal silhouette is stable. There is interval increased opacity at the right lung base, may be related to atelectasis versus pneumonia. There is small atelectasis and mild pleural effusion at the left lung base. There is no pneumothorax. There is no acute osseous abnormality. Interval increased opacity at the right lung base, may be related to atelectasis versus pneumonia. Small atelectasis and mild pleural effusion at the left lung, new since the prior study.      Xr Chest Standard (2 Vw)    Result Date: 4/29/2019  EXAMINATION: TWO VIEWS OF THE CHEST 4/29/2019 8:04 pm COMPARISON: 04/27/2019 HISTORY: ORDERING SYSTEM PROVIDED HISTORY: Follow-up lung infiltrate TECHNOLOGIST PROVIDED HISTORY: Follow-up lung infiltrate Ordering Physician Provided Reason for Exam: Follow-up infiltrate. Pt unable to lean forward - unable to get proper sponge behind pt for lateral. Pt unable to raise left arm at all for lateral. Best possible lateral obtained. Acuity: Unknown Type of Exam: Unknown Additional signs and symptoms: Follow-up infiltrate. Pt unable to lean forward - unable to get proper sponge behind pt for lateral. Pt unable to raise left arm at all for lateral. Best possible lateral obtained. FINDINGS: Left chest cardiac pacemaker device is in place. Right IJ central venous catheter in place with distal tip at the cavoatrial junction. Heart size is within normal limits. No vascular congestion. There are small to moderate pleural effusions. There are interstitial opacities in the upper lungs, which could be related to scarring and/or developing infiltrate, slightly improved in appearance when compared to 04/27/2019. No evidence of pneumothorax. 1.  Small to moderate bilateral pleural effusions, better visualized on lateral view. 2.  Upper lobe interstitial opacities, slightly improved when compared to the previous study, possibly related to underlying fibrotic change or residual infiltrate. Xr Knee Left (1-2 Views)    Result Date: 5/7/2019  EXAMINATION: 2 XRAY VIEWS OF THE LEFT KNEE 5/7/2019 11:19 am COMPARISON: Left knee radiographs 03/30/2019. HISTORY: ORDERING SYSTEM PROVIDED HISTORY: fracture TECHNOLOGIST PROVIDED HISTORY: fracture Ordering Physician Provided Reason for Exam: left knee/distal femur pain Acuity: Acute Type of Exam: Initial FINDINGS: Bones are osteopenic. No suprapatellar joint effusion. There is a impacted, transverse fracture of the distal femoral metadiaphysis. Increased sclerosis along the fracture line suggests interval healing.   Fracture fragments are in unchanged, near anatomic alignment. No acute dislocation. No new fracture is seen. Impacted, transverse fracture of the distal femoral metadiaphysis is in unchanged alignment and demonstrates interval healing. Xr Abdomen (kub) (single Ap View)    Result Date: 4/19/2019  EXAMINATION: SINGLE SUPINE XRAY VIEW(S) OF THE ABDOMEN 4/19/2019 9:48 am COMPARISON: April 14, 2019 HISTORY: ORDERING SYSTEM PROVIDED HISTORY: pain TECHNOLOGIST PROVIDED HISTORY: pain Ordering Physician Provided Reason for Exam: Abdominal pain and distentsion Acuity: Acute Type of Exam: Initial Additional signs and symptoms: Abdominal pain and distentsion FINDINGS: Enteric tube with the tip within the stomach. Small left pleural effusion. Minimal right pleural effusion. Mildly dilated loops of bowel. Nonspecific bowel gas pattern with mildly dilated loops of bowel. Xr Abdomen (kub) (single Ap View)    Result Date: 4/14/2019  EXAMINATION: SINGLE SUPINE XRAY VIEW(S) OF THE ABDOMEN 4/14/2019 7:39 am COMPARISON: CT abdomen and pelvis  film from 11 April 2019 HISTORY: [address] [place] [address] Avenue: Abd Distention TECHNOLOGIST PROVIDED HISTORY: Abd Distention Ordering Physician Provided Reason for Exam: Abdominal distention. Pt was moving around in the bed. Best films at present time. Acuity: Acute Type of Exam: Initial Additional signs and symptoms: Abdominal distention. Pt was moving around in the bed. Best films at present time. FINDINGS: Portable view time stamped at 748 hours demonstrates an intestinal tube terminating in the midportion of a gaseous Spike dilated stomach. Densities are present over the stomach likely medication. Bipolar pacemaker is in situ with intact leads. Heart size is top-normal, stable. Gaseous distension of the stomach and loop of bowel in the upper mid abdomen is noted but there is gas and fecal material in the rectum.   Gastric outlet obstruction or proximal small bowel partial obstruction is suspected. No free air is noted. Midline city is present over the pelvis likely a monitor or CT small bore catheter. Vascular calcification is present in the pelvis. Persistent preferential gaseous distension of the stomach although there is some gas in the upper abdomen and gas and fecal material present in the rectosigmoid. Findings suggest gastric outlet obstruction. Ct Abdomen W Contrast Additional Contrast? Radiologist Recommendation    Result Date: 5/5/2019  EXAMINATION: CT ABDOMEN WITH CONTRAST, 5/5/2019 3:38 pm TECHNIQUE: CT of the abdomen was performed with the administration of intravenous contrast. Multiplanar reformatted images are provided for review. Dose modulation, iterative reconstruction, and/or weight based adjustment of the mA/kV was utilized to reduce the radiation dose to as low as reasonably achievable. COMPARISON: April 14, 2019 HISTORY: ORDERING SYSTEM PROVIDED HISTORY: Check for abscess/fluid collection around ashley tube site. TECHNOLOGIST PROVIDED HISTORY: Ordering Physician Provided Reason for Exam: Patient c/o abd pain around her ashley site and drainage tube. FINDINGS: Evaluation is limited by motion. Lower Chest: Moderate bilateral pleural effusions. Organs: Multiple liver hypodensities measuring up to 4 mm are incompletely characterized but in the absence of risk factors likely represent cysts requiring no specific follow-up. Cholecystostomy tube is in place. No adjacent focal drainable fluid collection. No pancreatic lesion. No splenomegaly. No adrenal lesion. No hydronephrosis. No renal lesion. GI/Bowel: Stomach is markedly distended. Swirled appearance of the mesentery is seen within the mid to lower abdomen with adjacent thickened loops of small bowel. Peritoneum/Retroperitoneum: Atherosclerotic calcification of the abdominal aorta without aneurysmal dilatation. No adenopathy. Bones/Soft Tissues: No acute soft tissue abnormality. Diffuse osteopenia.  Lumbar spine degenerative changes. Bowel obstruction which is suspected to be caused by an internal hernia. Cholecystostomy tube is in place. No surrounding fluid collection. Nm Gi Blood Loss    Result Date: 5/3/2019  EXAMINATION: NUCLEAR MEDICINE GASTRIC BLEEDING STUDY 5/3/2019 TECHNIQUE: Following the intravenous injection of 23.8 mCi of 99 mTc-labeled RBC's, a flow study and standard images of the abdomen was obtained over a total period of 60 minutes COMPARISON: None. HISTORY: ORDERING SYSTEM PROVIDED HISTORY: GI BLEEDING TECHNOLOGIST PROVIDED HISTORY: Ordering Physician Provided Reason for Exam: GI bleeding Acuity: Unknown Type of Exam: Unknown FINDINGS: Activity is seen within the blood pool, including the great vessels, heart, liver, and spleen. There was no abnormal accumulation of radioactivity in the abdomen to suggest active bleeding during study acquisition. No evidence of active GI bleeding during acquisition. RECOMMENDATIONS: If the patient shows hemodynamic signs of an active bleed in the next 20 hours, additional images can be acquired. Ct Abdomen Pelvis W Iv Contrast    Result Date: 4/11/2019  EXAMINATION: CT OF THE ABDOMEN AND PELVIS WITH CONTRAST 4/11/2019 5:14 pm TECHNIQUE: CT of the abdomen and pelvis was performed with the administration of intravenous contrast. Multiplanar reformatted images are provided for review. Dose modulation, iterative reconstruction, and/or weight based adjustment of the mA/kV was utilized to reduce the radiation dose to as low as reasonably achievable. COMPARISON: None. HISTORY: ORDERING SYSTEM PROVIDED HISTORY: Abdominal pain TECHNOLOGIST PROVIDED HISTORY: IV Only Contrast Ordering Physician Provided Reason for Exam: patient c/o abd pain for an hour FINDINGS: Lower Chest: Trace pleural fluid bilaterally is noted. Indwelling cardiac pacemaker is present. Heart size is normal.  Coronary artery calcifications are evident.   The esophagus is significantly dilated and fluid-filled. No paraesophageal adenopathy is evident. Organs: The spleen, pancreas, and adrenals are unremarkable. The liver contains hypodensities, likely cysts. The gallbladder is distended and contains a solitary gallstone. The kidneys excrete contrast bilaterally. Extrarenal collecting systems are noted. The ureters are mildly dilated down to the urinary bladder without evidence of intraluminal or ureteral obstructing calculi. GI/Bowel: Marked distention of the stomach is noted; this continues to the pylorus with appearance suggesting partial gastric outlet obstruction. Fluid is present in some small bowel loops and colon distal to the stomach. No small bowel obstruction. Pelvis: Marked distention of the urinary bladder is noted. 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 Distended ureters may be related to reflux or infection. No free pelvic fluid, pelvic or inguinal adenopathy is noted. Peritoneum/Retroperitoneum: No aortic aneurysm. Mild to moderate plaque is noted in the infrarenal abdominal aorta and both proximal iliac arteries. Shotty lymph nodes are present around the aorta in the upper abdomen. No mesenteric adenopathy is noted. Bones/Soft Tissues: Degenerative changes are present in the hips and lower lumbar facets. Estimated biologic radiation dose for this procedure:258.77 mGy/cm2.     1. Dilatation of the thoracic esophagus filled with fluid. No obstructive process is noted. No adjacent enlarged lymph nodes. 2. Trace pleural fluid. 3. Marked distention of the stomach. This appears to extend to the pylorus. Partial gastric outlet obstruction is not excluded. 4. Bilateral dilated ureters without obstructing calculi. Urinary bladder is markedly distended with bladder wall air suggesting emphysematous cystitis. Dilatation of the ureters may be related to reflux or infection. 5. Atherosclerotic disease. 6. Other findings as above.  Critical results were called by Dr. Madeline Case MD to Tamia Valera on 4/11/2019 at 17:37. Kate Marcano No Device Plmt/replace/removal    Result Date: 4/30/2019  PROCEDURE: ULTRASOUND GUIDED VASCULAR ACCESS. FLUOROSCOPY GUIDED PICC PLACEMENT 4/30/2019. HISTORY: ORDERING SYSTEM PROVIDED HISTORY: TPN TECHNOLOGIST PROVIDED HISTORY: TPN Lumen?->Double Lumen SEDATION: None FLUOROSCOPY DOSE AND TYPE OR TIME AND EXPOSURES: 7 seconds; D  TECHNIQUE: This procedure was performed by Dr. Basilio Paris. Informed consent was obtained after a detailed explanation of the procedure including risks, benefits, and alternatives. Universal protocol was observed. The right arm was prepped and draped in sterile fashion using maximum sterile barrier technique. Local anesthesia was achieved with lidocaine. A micropuncture needle was used to access the right basilic vein using ultrasound guidance. An ultrasound image demonstrating patency of the vein with needle tip located within it. An image was obtained and stored in PACs. A 0.018 guidewire was used to place a peel-a-way sheath and a 5 Thai dual-lumen PICC was advanced with fluoroscopic guidance with the tip at the cavo-atrial junction. The catheter flushed easily and there was a good blood return. The catheter was secured to the skin. The patient tolerated the procedure well and there were no immediate complications. FINDINGS: Fluoroscopic image demonstrates the tip of the catheter at the cavo-atrial junction. Successful ultrasound and fluoroscopy guided PICC placement     Us Gallbladder Ruq    Result Date: 4/19/2019  EXAMINATION: RIGHT UPPER QUADRANT ULTRASOUND 4/19/2019 10:48 am COMPARISON: CT abdomen and pelvis 4/14/2018 HISTORY: ORDERING SYSTEM PROVIDED HISTORY: ABDOMINAL PAIN FINDINGS: Pancreas is not well visualized. No liver lesion. Gallbladder wall thickening. Gallbladder sludge. Cholelithiasis. Pericholecystic fluid.  Common bile duct measures at the upper limits of normal at 7 mm. Findings suggesting acute cholecystitis. Xr Chest Portable    Result Date: 4/27/2019  EXAMINATION: SINGLE XRAY VIEW OF THE CHEST 4/27/2019 8:47 am COMPARISON: 04/26/2019 HISTORY: ORDERING SYSTEM PROVIDED HISTORY: Assess for pulm edema vs PNA TECHNOLOGIST PROVIDED HISTORY: Assess for pulm edema vs PNA Ordering Physician Provided Reason for Exam: cough, congestion Acuity: Acute Type of Exam: Initial FINDINGS: Right IJ central venous catheter is in place tip in the SVC right atrial junction. Pacer wires are in place. The heart and mediastinal structures are stable. Pleural effusions are present with bibasilar atelectasis. Bilateral lung infiltrates are present in the upper lung fields. Persistent pleural effusions with bibasilar atelectasis and bilateral lung infiltrates with areas of consolidation developing in the upper lobes. Xr Chest Portable    Result Date: 4/26/2019  EXAMINATION: SINGLE XRAY VIEW OF THE CHEST 4/26/2019 6:51 am COMPARISON: April 24, 2019 HISTORY: ORDERING SYSTEM PROVIDED HISTORY: Pleural effusions TECHNOLOGIST PROVIDED HISTORY: Pleural effusions Ordering Physician Provided Reason for Exam: pleural effusion Acuity: Acute Type of Exam: Initial FINDINGS: Right IJ line in good position. Pacer device is stable. Cardiac leads overlie the chest.  Stable cardiomediastinal contours with calcified aortic arch. Left effusion and associated airspace disease, slightly decreased. Chronic blunting of right costophrenic sulcus may represent scarring or chronic effusion. Increased reticular markings right upper chest and left upper chest possibly interstitial edema or interstitial pneumonia. No new airspace consolidation. Increasing reticular markings upper chest bilaterally right greater than left possibly due to edema or interstitial pneumonitis. Improving left effusion with associated retrocardiac airspace disease.  Pleural-parenchymal scarring or effusion in the right lung base, stable. Xr Chest Portable    Result Date: 4/24/2019  EXAMINATION: SINGLE XRAY VIEW OF THE CHEST 4/24/2019 6:05 am COMPARISON: Chest radiograph dated 04/22/2019 HISTORY: ORDERING SYSTEM PROVIDED HISTORY: Acute respiratory failure, pleural effusion TECHNOLOGIST PROVIDED HISTORY: Acute respiratory failure, pleural effusion Ordering Physician Provided Reason for Exam: pleural effusion, respiratory failure. Acuity: Acute Type of Exam: Initial FINDINGS: Heart size is normal.  Dual-chamber pacemaker placed via left subclavian approach. Right internal jugular central line has tip in distal superior vena cava. Interval removal of nasogastric tube. No interval change of infiltrate and atelectasis at the left lung base. Stable mild infiltrate in the left upper lobe. Mild interval improvement of infiltrate at the right lung base. Stable small bilateral pleural effusions, left larger than right. Mild CHF, stable. 1.  No interval change of infiltrate and atelectasis at the left lung base. Stable mild infiltrate in the left upper lobe. 2.  Mild interval improvement of infiltrate at the right lung base. 3.  Stable small bilateral pleural effusions, left larger than right. 4.  Mild CHF, stable. Xr Chest Portable    Result Date: 4/22/2019  EXAMINATION: SINGLE XRAY VIEW OF THE CHEST 4/22/2019 6:44 am COMPARISON: April 21, 2019. HISTORY: ORDERING SYSTEM PROVIDED HISTORY: Hypoxia and CHF TECHNOLOGIST PROVIDED HISTORY: Hypoxia and CHF Ordering Physician Provided Reason for Exam: hx of hypoxia/CHF Acuity: Acute Type of Exam: Initial FINDINGS: Right IJ central venous catheter appears in unchanged position. Nasogastric tube courses below the diaphragm, with tip not imaged. Left chest wall pacemaker device projects in unchanged position. Cardiac and mediastinal contours are enlarged but unchanged. Unchanged bilateral pleural effusions and bibasilar pulmonary opacities.   Unchanged findings suggestive of congestive heart failure. No evidence of pneumothorax. No new osseous abnormalities. 1. No significant change in findings suggestive of congestive heart failure. 2. Unchanged bilateral pleural effusions and bibasilar pulmonary consolidations. RECOMMENDATION: Radiographic follow-up to complete resolution. Xr Chest Portable    Result Date: 4/21/2019  EXAMINATION: SINGLE XRAY VIEW OF THE CHEST 4/21/2019 3:08 pm COMPARISON: April 19, 2019 HISTORY: ORDERING SYSTEM PROVIDED HISTORY: hypoxia TECHNOLOGIST PROVIDED HISTORY: hypoxia Ordering Physician Provided Reason for Exam: hypoxia Acuity: Unknown Type of Exam: Unknown FINDINGS: Enteric tube in the stomach. ET tube is been removed. Right IJ catheter terminates in the atrial caval junction. Bipolar pacer on the left unchanged. Heart and mediastinum unremarkable. Moderate edema unchanged. Small effusions, left greater than right. Bony thorax intact. Status post extubation. Life support apparatus otherwise stable. Moderate edema and effusions unchanged. Xr Chest Portable    Result Date: 4/19/2019  EXAMINATION: SINGLE XRAY VIEW OF THE CHEST 4/19/2019 5:51 am COMPARISON: April 18, 2019 HISTORY: ORDERING SYSTEM PROVIDED HISTORY: ETT placement TECHNOLOGIST PROVIDED HISTORY: ETT placement Ordering Physician Provided Reason for Exam: Vent Pt check ETT placement Acuity: Acute Type of Exam: Subsequent/Follow-up Additional signs and symptoms: Vent Pt check ETT placement FINDINGS: Tubes and lines are unchanged. Persistent bibasilar lung opacities and pleural effusions. Mild pulmonary edema. No significant interval change.      Xr Chest Portable    Result Date: 4/18/2019  EXAMINATION: SINGLE XRAY VIEW OF THE CHEST 4/18/2019 6:21 am COMPARISON: April 17, 2019 HISTORY: ORDERING SYSTEM PROVIDED HISTORY: ETT placement TECHNOLOGIST PROVIDED HISTORY: ETT placement Ordering Physician Provided Reason for Exam: on vent Acuity: Acute Type of Exam: Initial FINDINGS: Right IJ line and NG tube are stable. Interval advancement of ET tube terminating 3.1 cm from ron. Implanted cardiac device is present. Left-sided effusion and associated airspace disease is stable. Hazy right basilar opacity possibly edema or atelectasis. No pneumothorax. Increased opacity left upper chest possibly focal area of atelectasis, new from prior. Advanced ETT from prior exam, now 3.1 cm from ron. Increased opacity left upper chest suspicious for atelectasis. Additional supporting devices are stable. Xr Chest Portable    Result Date: 4/17/2019  EXAMINATION: SINGLE XRAY VIEW OF THE CHEST 4/17/2019 7:15 am COMPARISON: 16 April 2019 HISTORY: ORDERING SYSTEM PROVIDED HISTORY: ETT placement TECHNOLOGIST PROVIDED HISTORY: ETT placement Ordering Physician Provided Reason for Exam: on vent Acuity: Acute Type of Exam: Initial FINDINGS: AP portable view of the chest time stamped at 613 hours demonstrates overlying cardiac monitoring electrodes. A right internal jugular catheter terminates in the superior vena cava. Endotracheal tube is somewhat high riding terminating 6.4 cm above the ron. Intestinal tube extends beyond the body of the stomach, tip not included on the exam.  Bipolar pacemaker enters from the left with intact leads in appropriate positions. Heart size is normal.  Left pleural effusion is noted there is continued volume loss in the left hemithorax with stable mild vascular congestion, perihilar opacities, and faint opacity in the right mid and lower lung field. Underlying COPD is noted. Osseous structures are stable. There is little change from prior study. Findings of COPD, mild central vascular congestion, left effusion, and scattered opacities favoring interstitial edema with multifocal pneumonitis not excluded. Tubes and lines as above. However, endotracheal tube is high riding.  The findings were sent to the Radiology Results [address] at 7:58 am on 4/17/2019to be communicated to a licensed caregiver. RECOMMENDATION: Suggest advancement of endotracheal tube. Xr Chest Portable    Result Date: 4/16/2019  EXAMINATION: SINGLE XRAY VIEW OF THE CHEST 4/16/2019 6:54 am COMPARISON: 04/15/2019, 610 hours HISTORY: ORDERING SYSTEM PROVIDED HISTORY: ETT placement TECHNOLOGIST PROVIDED HISTORY: ETT placement Ordering Physician Provided Reason for Exam: on vent Acuity: Acute Type of Exam: Initial 17-year-old female on ventilator; check endotracheal tube placement FINDINGS: Portable AP upright view of the chest. Endotracheal tube distal tip overlying the mid trachea approximately 4.1 cm above the level of the ron. Enteric tube traverses the GE junction with distal tip excluded from the field of view. Left subclavian approach cardiac pacemaker device distal lead tips relatively stable in position. Right internal jugular approach central venous catheter distal tip overlying the high right atrium, stable. Cardiac monitor leads overlie the chest. Atherosclerotic calcification of the thoracic aorta. Slight stable volume loss of the left hemithorax. No pneumothorax. No free air. Dense retrocardiac/left basilar airspace consolidation and small left-sided pleural effusion. Stable mild focal opacity at the right mid lung zone. Underlying COPD. Stable mild pulmonary vascular congestion and left-sided predominant parahilar opacity. Visualized osseous structures remain unchanged. 1. Stable multifocal airspace disease as detailed above with dense retrocardiac/left basilar airspace consolidation and small left-sided pleural effusion. Mild pulmonary vascular congestion. Findings may represent edema or multifocal pneumonia. 2. Underlying COPD. 3. Tubes and line as detailed above.      Xr Chest Portable    Result Date: 4/15/2019  EXAMINATION: SINGLE XRAY VIEW OF THE CHEST 4/15/2019 6:47 am COMPARISON: 14 April 2019 HISTORY: ORDERING SYSTEM PROVIDED HISTORY: ETT placement +------------------------------------+----------+---------------+----------+ ! Dist Axillary                       ! Yes       ! Yes            ! None      ! +------------------------------------+----------+---------------+----------+ ! Prox Brachial                       !Yes       ! Yes            ! None      ! +------------------------------------+----------+---------------+----------+ ! Dist Brachial                       !Yes       ! Yes            ! None      ! +------------------------------------+----------+---------------+----------+ ! Prox Radial                         !Yes       ! Yes            ! None      ! +------------------------------------+----------+---------------+----------+ ! Dist Radial                         !Yes       ! Yes            ! None      ! +------------------------------------+----------+---------------+----------+ ! Prox Ulnar                          ! Yes       ! Yes            ! None      ! +------------------------------------+----------+---------------+----------+ ! Dist Ulnar                          ! Yes       ! Yes            ! None      ! +------------------------------------+----------+---------------+----------+ ! Basilic at [facility]                       ! Yes       ! Yes            ! None      ! +------------------------------------+----------+---------------+----------+ ! Basilic at AF                       ! Yes       ! Yes            ! None      ! +------------------------------------+----------+---------------+----------+ ! Basilic at [address]                       ! Yes       ! Yes            ! None      ! +------------------------------------+----------+---------------+----------+ ! Cephalic at [facility]                      ! Yes       ! Yes            ! None      ! +------------------------------------+----------+---------------+----------+ ! Gideon at AF                      ! Yes       ! Yes            ! None      ! +------------------------------------+----------+---------------+----------+ ! Gideon at [address] !Yes       !Yes            ! None      ! +------------------------------------+----------+---------------+----------+ Doppler Measurements +-------------------------+-----------------------+------------------------+ ! Location                 ! Signal                 !Reflux                  ! +-------------------------+-----------------------+------------------------+ ! SCV                      ! Phasic                 ! No                      ! +-------------------------+-----------------------+------------------------+ ! Axillary                 ! Phasic                 ! No                      ! +-------------------------+-----------------------+------------------------+ ! Brachial                 !Phasic                 ! No                      ! +-------------------------+-----------------------+------------------------+ Left UE Vein Measurements 2D Measurements +------------------------------------+----------+---------------+----------+ ! Location                            ! Visualized! Compressibility! Thrombosis! +------------------------------------+----------+---------------+----------+ ! Prox IJV                            ! Yes       ! Yes            ! None      ! +------------------------------------+----------+---------------+----------+ ! Dist IJV                            ! Yes       ! Yes            ! None      ! +------------------------------------+----------+---------------+----------+ ! Prox SCV                            ! Yes       ! Yes            ! None      ! +------------------------------------+----------+---------------+----------+ ! Dist SCV                            ! Yes       ! Yes            ! None      ! +------------------------------------+----------+---------------+----------+ ! Prox Axillary                       ! Yes       ! Yes            ! None      ! +------------------------------------+----------+---------------+----------+ ! Dist Axillary                       ! Yes       ! Yes physician) on 05/07/2019 06:45 PM  ----------------------------------------------------------------  Findings:   Right Impression:                    Left Impression:  The common femoral, femoral,         The common femoral, femoral,  popliteal and tibial veins           popliteal and tibial veins  demonstrate normal compressibility   demonstrate normal compressibility  and augmentation. and augmentation. Limited visualization of the calf    Limited visualization of the calf  veins. veins. Normal compressibility of the great  Normal compressibility of the great  saphenous vein. saphenous vein. Normal compressibility of the small  Normal compressibility of the small  saphenous vein. saphenous vein. Velocities are measured in cm/s ; Diameters are measured in cm Right Lower Extremities DVT Study Measurements Right 2D Measurements +------------------------------------+----------+---------------+----------+ ! Location                            ! Visualized! Compressibility! Thrombosis! +------------------------------------+----------+---------------+----------+ ! Common Femoral                      !Yes       ! Yes            ! None      ! +------------------------------------+----------+---------------+----------+ ! Prox Femoral                        !Yes       ! Yes            ! None      ! +------------------------------------+----------+---------------+----------+ ! Mid Femoral                         !Yes       ! Yes            ! None      ! +------------------------------------+----------+---------------+----------+ ! Dist Femoral                        !Yes       ! Yes            ! None      ! +------------------------------------+----------+---------------+----------+ ! Deep Femoral                        !Yes       ! Yes            ! None      ! +------------------------------------+----------+---------------+----------+ ! Popliteal !Yes       !Yes            ! None      ! +------------------------------------+----------+---------------+----------+ ! Sapheno Femoral Junction            ! Yes       ! Yes            ! None      ! +------------------------------------+----------+---------------+----------+ ! PTV                                 ! Partial   !Yes            ! None      ! +------------------------------------+----------+---------------+----------+ ! Peroneal                            !Partial   !Yes            ! None      ! +------------------------------------+----------+---------------+----------+ ! Gastroc                             ! Yes       ! Yes            ! None      ! +------------------------------------+----------+---------------+----------+ ! GSV Thigh                           ! Yes       ! Yes            ! None      ! +------------------------------------+----------+---------------+----------+ ! GSV Knee                            ! Yes       ! Yes            ! None      ! +------------------------------------+----------+---------------+----------+ ! GSV Ankle                           ! Yes       ! Yes            ! None      ! +------------------------------------+----------+---------------+----------+ ! SSV                                 ! Partial   !Yes            ! None      ! +------------------------------------+----------+---------------+----------+ Left Lower Extremities DVT Study Measurements Left 2D Measurements +------------------------------------+----------+---------------+----------+ ! Location                            ! Visualized! Compressibility! Thrombosis! +------------------------------------+----------+---------------+----------+ ! Common Femoral                      !Yes       ! Yes            ! None      ! +------------------------------------+----------+---------------+----------+ ! Prox Femoral                        !Yes       ! Yes            ! None      ! +------------------------------------+----------+---------------+----------+ ! Mid Femoral                         !Yes       ! Yes            ! None      ! +------------------------------------+----------+---------------+----------+ ! Dist Femoral                        !Yes       ! Yes            ! None      ! +------------------------------------+----------+---------------+----------+ ! Deep Femoral                        !Yes       ! Yes            ! None      ! +------------------------------------+----------+---------------+----------+ ! Popliteal                           !Yes       ! Yes            ! None      ! +------------------------------------+----------+---------------+----------+ ! Sapheno Femoral Junction            ! Yes       ! Yes            ! None      ! +------------------------------------+----------+---------------+----------+ ! PTV                                 ! Partial   !Yes            ! None      ! +------------------------------------+----------+---------------+----------+ ! Peroneal                            !Partial   !Yes            ! None      ! +------------------------------------+----------+---------------+----------+ ! Gastroc                             ! Yes       ! Yes            ! None      ! +------------------------------------+----------+---------------+----------+ ! GSV Thigh                           ! Yes       ! Yes            ! None      ! +------------------------------------+----------+---------------+----------+ ! GSV Knee                            ! Yes       ! Yes            ! None      ! +------------------------------------+----------+---------------+----------+ ! GSV Ankle                           ! Yes       ! Yes            ! None      ! +------------------------------------+----------+---------------+----------+ ! SSV                                 ! Partial   !Yes            ! None      ! +------------------------------------+----------+---------------+----------+    Ir Cholecystostomy Percutaneous Complete    Result Date: 4/22/2019  PROCEDURE: ULTRASOUND and fluoroscopic GUIDED CHOLECYSTOSTOMY TUBE PLACEMENT April 22, 2019 HISTORY: ORDERING SYSTEM PROVIDED HISTORY: acute cholecystitis TECHNOLOGIST PROVIDED HISTORY: acute cholecystitis SEDATION: 75 mcg IV fentanyl for pain TECHNIQUE: Informed consent was obtained after a detailed explanation of the procedure including risks, benefits, and alternatives. Universal protocol was followed. A suitable skin site was prepped and draped in sterile fashion following ultrasound localization. An 18 gauge needle was advanced under ultrasound guidance into the gallbladder via transhepatic route and a 0.035 guidewire was used to place a 8 [place] cholecystostomy tube under fluoroscopic guidance. The catheter was sutured to the skin and the patient tolerated the procedure well. Thick bilious material was sent for laboratory analysis. The catheter was attached to gravity drainage. FINDINGS: Ultrasound image demonstrates distended gallbladder. Bilious fluid was sent for culture. Successful placement of transhepatic 8 [place] percutaneous cholecystostomy tube. Nm Hepatobiliary Scan W Ejection Fraction    Result Date: 4/20/2019  EXAMINATION: NUCLEAR MEDICINE HEPATOBILIARY SCINTIGRAPHY (HIDA SCAN). 4/20/2019 2:15 pm TECHNIQUE: Approximately 5.6 [id number] Mebrofenin (Choletec) was administered IV. Then, dynamic images of the abdomen were obtained in the anterior projection for 60 mins. A right lateral view was also obtained at 60 mins. Delayed images up to 4 hours were obtained. COMPARISON: Ultrasound 04/19/2019 HISTORY: ORDERING SYSTEM PROVIDED HISTORY: CHOLECYSTITIS TECHNOLOGIST PROVIDED HISTORY: Ordering Physician Provided Reason for Exam: Cholecystitis Acuity: Unknown Type of Exam: Unknown FINDINGS: Prompt, homogenous uptake by the liver is noted with normal appearance of radiotracer excretion into the biliary system.   Clearance of bloodpool activity appears appropriate. Normal small bowel activity is seen. Prominent activity within the common bile duct. The gallbladder is not visualized by the end of the exam.     Absent filling of the gallbladder consistent with acute cholecystitis. Consultations:    Consults:     Final Specialist Recommendations/Findings:   IP CONSULT TO UROLOGY  IP CONSULT TO PRIMARY CARE PROVIDER  IP CONSULT TO SOCIAL WORK  IP CONSULT TO GENERAL SURGERY  IP CONSULT TO CRITICAL CARE  IP CONSULT TO INFECTIOUS DISEASES  IP CONSULT TO GI  IP CONSULT TO DIETITIAN  IP CONSULT TO DIETITIAN  IP CONSULT TO ORTHOPEDIC SURGERY  IP CONSULT TO PHARMACY  IP CONSULT TO GI  IP CONSULT TO PALLIATIVE CARE  IP CONSULT TO ORTHOPEDIC SURGERY  IP CONSULT TO PSYCHIATRY  IP CONSULT TO ETHICS      The patient was seen and examined on day of discharge and this discharge summary is in conjunction with any daily progress note from day of discharge. Discharge plan:       Disposition: Home    Physician Follow Up:     DO [address]   [address]  [phone]             Requiring Further Evaluation/Follow Up POST HOSPITALIZATION/Incidental Findings:     Diet:     Activity: As tolerated    Instructions to Patient:     Discharge Medications:      Medication List      ASK your doctor about these medications    amLODIPine 10 MG tablet  Commonly known as:  NORVASC     aspirin 81 MG tablet     clopidogrel 75 MG tablet  Commonly known as:  PLAVIX  TAKE 1 TABLET DAILY     DOCQLACE 100 MG capsule  Generic drug:  docusate sodium  TAKE 1 CAPSULE BY MOUTH IN THE MORNING & IN THE EVENING -DRINK PLENTYOF WATER WHILE TAKING THIS MEDICINE     LORazepam 0.5 MG tablet  Commonly known as:  ATIVAN     omeprazole 20 MG delayed release capsule  Commonly known as:  PRILOSEC     oxyCODONE-acetaminophen 5-325 MG per tablet  Commonly known as:  PERCOCET     senna-docusate 8.6-50 MG per tablet  Commonly known as:  PERICOLACE     sucralfate 1 GM/10ML suspension  Commonly known as:  CARAFATE     SYMBICORT 160-4.5 MCG/ACT Aero  Generic drug:  budesonide-formoterol     therapeutic multivitamin-minerals tablet     tiZANidine 2 MG tablet  Commonly known as:  ZANAFLEX     traZODone 50 MG tablet  Commonly known as:  DESYREL     venlafaxine 37.5 MG tablet  Commonly known as:  [facility]     Wheelchair Misc  Power wheelchair with left fupper extremity support  Dx: stroke with left hemiparesis. Time Spent on discharge is  31 mins in patient examination, evaluation, counseling as well as medication reconciliation, prescriptions for required medications, discharge plan and follow up. Electronically signed by   Marek Pendleton MD  5/8/2019  8:53 AM      Thank you Dr. Giovanny Pena DO for the opportunity to be involved in this patient's care. Attending Physician Statement  I have reviewed and edited the discharge summary of  [facility] NEED[facility]  ,   including pertinent history and exam findings. I have reviewed the key elements of all parts of the discharge summary . I agree with the information and plans as documented by the resident. Time spent on discharge planning ;          [] less than 30 minutes . [x]   more  than 30 minutes . Electronically signed by Eric Pascal MD on 5/25/2019 .

## 2019-05-08 NOTE — PROGRESS NOTES
Infectious disease Consult Note      Patient: Lisandra Barboza  : 1950  Acct#:  [id number]     Date:  2019    Assessment:     Anemia/GI bleed followed by GI  Ileus    Cholecystitis status post cholecystectomy tube  grew Candida non-albicans and one colony of ESBL Klebsiella on culture . finished ABXS course      Ecoli Emphysematous cystitis treated     Aspiration pneumonia of right lower lobe treated    Sepsis /Septic shock     Yeast growth on sputum culture suspect contamination     Acute hypoxic resp failure resolved    MRSA carrier    Urinary retention     Anemia      PCN allergy               Recommendations:     Monitor off ABXS for now  Follow CBC , renal function closely . Subjective:       History of Present Illness  Patient is a 77 y.o.  female admitted with Sepsis due to urinary tract infection   who is seen in consult for the same . She presented w dysuria and lower abd pain for around a week associated with vomiting ,was found hypotensive with leukocytosis . CT suggested emphysematous cystitis,possible gastric outlet obstruction. CXR showed a right lower infiltrate. High CA-19-9,CEA  Allergy to [facility] w SOB   Urine culture  grew ESCHERICHIA COLI resistant to Ampicillin ,sensitive to all other tested ABXS . Blood cultures negative . Mycoplasma IGM 0.97   YEAST MODERATE GROWTH on sputum culture   S/P EGD   with reported esophagitis. GB US Findings suggesting acute cholecystitis. HIIDA showed absent gallbladder filling. She was extubated on   Status post cholecystectomy tube  by interventional radiology,  cultures on  grew Candida non-albicans and one colony of ESBL Klebsiella. PICC line was placed   Tagged RBC scan  was negative . CT abdomen  showed no fluid collection ,Bowel obstruction which is suspected to be caused by an internal hernia. Interval history :  She is complaining of dry mouth and generalized weakness .   She is  still on TPN  HGB 7.1,GI , refusing interventions  No fever ,temp max 99.7  WBC 12. 1. pro calcitonin nl      Past Medical History:   Diagnosis Date    Abnormal computed tomography of cervical spine     sclerotic bone appearance     CVA (cerebral vascular accident) ([address]     left  side weakness    GERD (gastroesophageal reflux disease)     Hypertension     Paraproteinemia     Weight loss       Past Surgical History:   Procedure Laterality Date    INTUBATION  4/14/2019         PACEMAKER PLACEMENT  07/2011    Pacemaker is Medtronic Revo (compatible). Leads placed in 1995 are NOT MRI compatible. Placed at [address] [place] [address]. V's per Dr. Mandi Frey can not have an MRI.  UPPER GASTROINTESTINAL ENDOSCOPY N/A 4/16/2019    EGD ESOPHAGOGASTRODUODENOSCOPY @ BEDSIDE  ICU 2002 performed by Kevin Crespo MD at [address] [facility]  No current facility-administered medications on file prior to encounter. Current Outpatient Medications on File Prior to Encounter   Medication Sig Dispense Refill    oxyCODONE-acetaminophen (PERCOCET) 5-325 MG per tablet Take 1 tablet by mouth every 6 hours as needed for Pain.  senna-docusate (PERICOLACE) 8.6-50 MG per tablet Take 1 tablet by mouth 2 times daily      budesonide-formoterol (SYMBICORT) 160-4.5 MCG/ACT AERO Inhale 2 puffs into the lungs 2 times daily      sucralfate (CARAFATE) 1 GM/10ML suspension Take 1 g by mouth 4 times daily       traZODone (DESYREL) 50 MG tablet Take 50 mg by mouth nightly      tiZANidine (ZANAFLEX) 2 MG tablet Take 2 mg by mouth every 8 hours as needed (left knee)       aspirin 81 MG tablet Take 81 mg by mouth daily      clopidogrel (PLAVIX) 75 MG tablet TAKE 1 TABLET DAILY 30 tablet 2    DOCQLACE 100 MG capsule TAKE 1 CAPSULE BY MOUTH IN THE MORNING & IN THE EVENING -DRINK PLENTYOF WATER WHILE TAKING THIS MEDICINE 30 capsule 1    amLODIPine (NORVASC) 10 MG tablet Take 10 mg by mouth daily.       LORazepam (ATIVAN) 0.5 MG tablet Take 0.5 mg by mouth every 6 hours as needed for Anxiety.  therapeutic multivitamin-minerals (THERAGRAN-M) tablet Take 1 tablet by mouth daily.  omeprazole (PRILOSEC) 20 MG capsule Take 20 mg by mouth daily.  venlafaxine (EFFEXOR) 37.5 MG tablet Take 37.5 mg by mouth 3 times daily.  Misc. Devices [facility]) [address] [place] wheelchair with left fupper extremity support  Dx: stroke with left hemiparesis. 1 each 0           Allergies  Allergies   Allergen Reactions    Penicillins Shortness Of Breath and Rash    Amoxicillin         Social   Social History     Tobacco Use    Smoking status: Current Some Day Smoker     Packs/day: 1.00     Years: 30.00     Pack years: 30.00    Smokeless tobacco: Never Used   Substance Use Topics    Alcohol use: Not Currently     Alcohol/week: 16.8 oz     Types: 28 Glasses of wine per week                History reviewed. No pertinent family history. Review of Systems  Other than above 12 systems reviewed negative . Tolerating antibiotics. Physical Exam  /67   Pulse 122   Temp 99 °F (37.2 °C) (Axillary)   Resp 16   Ht 5' (1.524 m)   Wt 108 lb 0.4 oz (49 kg)   SpO2 98%   BMI 21.10 kg/m²           General Appearance: alert ,NAD . Skin: warm and dry, no rash or erythema  Head: normocephalic and atraumatic  Eyes: pupils equal, round  Neck: neck supple and non tender   Pulmonary/Chest: coarse to auscultation bilaterally- no wheezes, rales or rhonchi  Cardiovascular: normal rate, regular rhythm, normal S1 and S2, no murmurs.   Abdomen: soft,mild upper abdomen tenderness  -distended,  no masses or organomegaly, gallbladder drain with  bile drainage   Extremities: no cyanosis, clubbing   Edema   PICC line in place       Data Review:    Recent Labs     05/06/19  0527  05/07/19  0509  05/07/19  2339 05/08/19  0515 05/08/19  1111   WBC 9.4  --  9.8  --   --  12.1*  --    HGB 7.2*   < > 7.1*   < > 7.2* 7.2* 7.8*   HCT 21.7*   < > 21.6*   < > 22.3* 22.4* 22.5* MCV 87.3  --  88.1  --   --  88.5  --      --  286  --   --  343  --     < > = values in this interval not displayed. Recent Labs     05/06/19 0527 05/07/19 0509 05/08/19  0515   * 132* 135   K 4.2 4.6 3.8   CL 97* 101 103   CO2 27 24 23   PHOS 3.3 3.1 3.1   BUN 21 17 19   CREATININE <0.40* <0.40* <0.40*     Recent Labs     05/06/19 0527 05/07/19 0509 05/08/19 0515   AST 15 19 16   ALT 15 17 15   BILITOT 0.31 0.28* 0.26*   ALKPHOS 161* 198* 178*     No results for input(s): LIPASE, AMYLASE in the last 72 hours. Recent Labs     05/06/19 0527 05/07/19 0509 05/08/19 0515   PROTIME 15.5* 15.5* 16.1*   INR 1.2 1.2 1.3       Imaging Studies:                           All appropriate imaging studies and reports reviewed: Yes       Ct Abdomen Pelvis Wo Contrast Additional Contrast? Oral    Result Date: 4/14/2019  EXAMINATION: CT OF THE ABDOMEN AND PELVIS WITHOUT CONTRAST 4/14/2019 7:34 pm TECHNIQUE: CT of the abdomen and pelvis was performed without the administration of intravenous contrast. Multiplanar reformatted images are provided for review. Dose modulation, iterative reconstruction, and/or weight based adjustment of the mA/kV was utilized to reduce the radiation dose to as low as reasonably achievable. COMPARISON: 04/11/2019 HISTORY: ORDERING SYSTEM PROVIDED HISTORY: ABDOMINAL PAIN TECHNOLOGIST PROVIDED HISTORY: Water soluble contrast only please Ordering Physician Provided Reason for Exam: Abdominal pain - Vented patient. Contrast given via nurse through NG tube. Acuity: Unknown Type of Exam: Unknown Relevant Medical/Surgical History: Hx - Sepsis due to urinary tract infection. FINDINGS: Lower Chest: New moderate layering bilateral pleural effusions with bilateral lower lobe atelectasis. Organs: Limited evaluation due lack of intravenous contrast.  Cholelithiasis redemonstrated. No gallbladder wall thickening or biliary ductal dilatation.  Scattered tiny hypodense lesions in the liver are too small to characterize but statistically represent benign cysts or hemangiomas and appear unchanged. The pancreas, spleen, adrenal glands, and kidneys are unremarkable. There is no hydronephrosis or urinary tract calculus. GI/Bowel: The stomach is distended. Enteric tube is in place. No contrast is seen distal to the pylorus and there is contrast reflux into the distal esophagus. There is no evidence of bowel obstruction. The appendix is not definitely visualized. No focal pericecal inflammatory changes are evident. Pelvis: The urinary bladder is decompressed by Tran catheter. No pelvic mass is seen. Peritoneum/Retroperitoneum: Small amount of free fluid in the pelvic cavity. No free air or focal fluid collection. No abnormal lymph node. Normal abdominal aortic caliber. Moderate calcific atherosclerosis. Bones/Soft Tissues: No acute osseous abnormality. Diffuse anasarca. Moderate degenerative changes in the lumbar spine. 1. Distended, contrast filled stomach with reflux of contrast into the esophagus. No enteric contrast is seen distal to the pylorus suggesting delayed gastric emptying in the setting of ileus. 2. New moderate layering bilateral pleural effusions with bilateral lower lobe atelectasis. 3. Small amount of nonspecific free fluid in the pelvic cavity. 4. Anasarca. 5. Cholelithiasis. Xr Chest (single View Frontal)    Result Date: 4/13/2019  EXAMINATION: SINGLE XRAY VIEW OF THE CHEST 4/13/2019 7:18 am COMPARISON: April 12, 2019 HISTORY: ORDERING SYSTEM PROVIDED HISTORY: dyspnea TECHNOLOGIST PROVIDED HISTORY: dyspnea Ordering Physician Provided Reason for Exam: dyspnea Acuity: Acute Type of Exam: Subsequent/Follow-up Additional signs and symptoms: dyspnea FINDINGS: A right IJ catheter is seen with its tip terminating at the superior cavoatrial junction. The left chest wall pacemaker and leads are stable. The cardiomediastinal silhouette is stable.   There is interval increased opacity at the right lung base, may be related to atelectasis versus pneumonia. There is small atelectasis and mild pleural effusion at the left lung base. There is no pneumothorax. There is no acute osseous abnormality. Interval increased opacity at the right lung base, may be related to atelectasis versus pneumonia. Small atelectasis and mild pleural effusion at the left lung, new since the prior study. Xr Femur Left (min 2 Views)    Result Date: 3/27/2019  EXAMINATION: 4 XRAY VIEWS OF THE LEFT FEMUR; 2 XRAY VIEWS OF THE LEFT KNEE; 3 XRAY VIEWS OF THE LEFT TIBIA AND FIBULA 3/27/2019 7:00 pm COMPARISON: Left hip 11/14/2015. HISTORY: ORDERING SYSTEM PROVIDED HISTORY: pain TECHNOLOGIST PROVIDED HISTORY: pain Ordering Physician Provided Reason for Exam: pt twisted wrong, lt knee pain, unable to straighten knee. Acuity: Acute Type of Exam: Initial FINDINGS: Left femur, four views: The bones are diffusely osteopenic. No acute fracture deformity. The hip joint is maintained. The femoral head projects within the acetabulum. No focal soft tissue abnormality is seen. Left knee, two views: The knee is flexed. No acute osseous abnormality. No joint effusion. Joint spaces are not optimally profiled but no significant arthritic changes are apparent. Left tib-fib, three views: There is evidence of a remote healed distal tibia fracture. No acute fracture or dislocation is seen. No focal soft tissue abnormality. No acute osseous abnormality of the left femur. No acute osseous abnormality of the left knee. No acute osseous abnormality of the left tib-fib. Healed remote distal tibia fracture. Marked osteopenia, likely due to disuse. Xr Knee Left (1-2 Views)    Result Date: 3/27/2019  EXAMINATION: 4 XRAY VIEWS OF THE LEFT FEMUR; 2 XRAY VIEWS OF THE LEFT KNEE; 3 XRAY VIEWS OF THE LEFT TIBIA AND FIBULA 3/27/2019 7:00 pm COMPARISON: Left hip 11/14/2015.  HISTORY: ORDERING SYSTEM PROVIDED HISTORY: pain TECHNOLOGIST PROVIDED HISTORY: pain Ordering Physician Provided Reason for Exam: pt twisted wrong, lt knee pain, unable to straighten knee. Acuity: Acute Type of Exam: Initial FINDINGS: Left femur, four views: The bones are diffusely osteopenic. No acute fracture deformity. The hip joint is maintained. The femoral head projects within the acetabulum. No focal soft tissue abnormality is seen. Left knee, two views: The knee is flexed. No acute osseous abnormality. No joint effusion. Joint spaces are not optimally profiled but no significant arthritic changes are apparent. Left tib-fib, three views: There is evidence of a remote healed distal tibia fracture. No acute fracture or dislocation is seen. No focal soft tissue abnormality. No acute osseous abnormality of the left femur. No acute osseous abnormality of the left knee. No acute osseous abnormality of the left tib-fib. Healed remote distal tibia fracture. Marked osteopenia, likely due to disuse. Xr Knee Left (3 Views)    Result Date: 3/30/2019  EXAMINATION: 3 XRAY VIEWS OF THE LEFT KNEE 3/30/2019 10:58 am COMPARISON: March 27, 2018 HISTORY: ORDERING SYSTEM PROVIDED HISTORY: F/u L knee pain after cast TECHNOLOGIST PROVIDED HISTORY: AP, oblique, lateral F/u L knee pain after cast FINDINGS: Marked osteopenia. Interval casting, which degrades fine osseous detail question cortical offset in the lateral femoral metaphysis. No malalignment identified. No significant joint effusion. Interval casting. Question nondisplaced fracture in the lateral femoral metaphysis. Osteopenia. Xr Tibia Fibula Left (2 Views)    Result Date: 3/27/2019  EXAMINATION: 4 XRAY VIEWS OF THE LEFT FEMUR; 2 XRAY VIEWS OF THE LEFT KNEE; 3 XRAY VIEWS OF THE LEFT TIBIA AND FIBULA 3/27/2019 7:00 pm COMPARISON: Left hip 11/14/2015.  HISTORY: ORDERING SYSTEM PROVIDED HISTORY: pain TECHNOLOGIST PROVIDED HISTORY: pain Ordering Physician Provided Reason for Exam: pt twisted wrong, lt knee pain, unable to straighten knee. Acuity: Acute Type of Exam: Initial FINDINGS: Left femur, four views: The bones are diffusely osteopenic. No acute fracture deformity. The hip joint is maintained. The femoral head projects within the acetabulum. No focal soft tissue abnormality is seen. Left knee, two views: The knee is flexed. No acute osseous abnormality. No joint effusion. Joint spaces are not optimally profiled but no significant arthritic changes are apparent. Left tib-fib, three views: There is evidence of a remote healed distal tibia fracture. No acute fracture or dislocation is seen. No focal soft tissue abnormality. No acute osseous abnormality of the left femur. No acute osseous abnormality of the left knee. No acute osseous abnormality of the left tib-fib. Healed remote distal tibia fracture. Marked osteopenia, likely due to disuse. Xr Abdomen (kub) (single Ap View)    Result Date: 4/14/2019  EXAMINATION: SINGLE SUPINE XRAY VIEW(S) OF THE ABDOMEN 4/14/2019 7:39 am COMPARISON: CT abdomen and pelvis  film from 11 April 2019 HISTORY: [facility]: Abd Distention TECHNOLOGIST PROVIDED HISTORY: Abd Distention Ordering Physician Provided Reason for Exam: Abdominal distention. Pt was moving around in the bed. Best films at present time. Acuity: Acute Type of Exam: Initial Additional signs and symptoms: Abdominal distention. Pt was moving around in the bed. Best films at present time. FINDINGS: Portable view time stamped at 748 hours demonstrates an intestinal tube terminating in the midportion of a gaseous Spike dilated stomach. Densities are present over the stomach likely medication. Bipolar pacemaker is in situ with intact leads. Heart size is top-normal, stable. Gaseous distension of the stomach and loop of bowel in the upper mid abdomen is noted but there is gas and fecal material in the rectum.   Gastric outlet obstruction or proximal small bowel partial obstruction is suspected. No free air is noted. Midline city is present over the pelvis likely a monitor or CT small bore catheter. Vascular calcification is present in the pelvis. Persistent preferential gaseous distension of the stomach although there is some gas in the upper abdomen and gas and fecal material present in the rectosigmoid. Findings suggest gastric outlet obstruction. Ct Abdomen Pelvis W Iv Contrast    Result Date: 4/11/2019  EXAMINATION: CT OF THE ABDOMEN AND PELVIS WITH CONTRAST 4/11/2019 5:14 pm TECHNIQUE: CT of the abdomen and pelvis was performed with the administration of intravenous contrast. Multiplanar reformatted images are provided for review. Dose modulation, iterative reconstruction, and/or weight based adjustment of the mA/kV was utilized to reduce the radiation dose to as low as reasonably achievable. COMPARISON: None. HISTORY: ORDERING SYSTEM PROVIDED HISTORY: Abdominal pain TECHNOLOGIST PROVIDED HISTORY: IV Only Contrast Ordering Physician Provided Reason for Exam: patient c/o abd pain for an hour FINDINGS: Lower Chest: Trace pleural fluid bilaterally is noted. Indwelling cardiac pacemaker is present. Heart size is normal.  Coronary artery calcifications are evident. The esophagus is significantly dilated and fluid-filled. No paraesophageal adenopathy is evident. Organs: The spleen, pancreas, and adrenals are unremarkable. The liver contains hypodensities, likely cysts. The gallbladder is distended and contains a solitary gallstone. The kidneys excrete contrast bilaterally. Extrarenal collecting systems are noted. The ureters are mildly dilated down to the urinary bladder without evidence of intraluminal or ureteral obstructing calculi. GI/Bowel: Marked distention of the stomach is noted; this continues to the pylorus with appearance suggesting partial gastric outlet obstruction. Fluid is present in some small bowel loops and colon distal to the stomach. No small bowel obstruction. Pelvis: Marked distention of the urinary bladder is noted. 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 Distended ureters may be related to reflux or infection. No free pelvic fluid, pelvic or inguinal adenopathy is noted. Peritoneum/Retroperitoneum: No aortic aneurysm. Mild to moderate plaque is noted in the infrarenal abdominal aorta and both proximal iliac arteries. Shotty lymph nodes are present around the aorta in the upper abdomen. No mesenteric adenopathy is noted. Bones/Soft Tissues: Degenerative changes are present in the hips and lower lumbar facets. Estimated biologic radiation dose for this procedure:258.77 mGy/cm2.     1. Dilatation of the thoracic esophagus filled with fluid. No obstructive process is noted. No adjacent enlarged lymph nodes. 2. Trace pleural fluid. 3. Marked distention of the stomach. This appears to extend to the pylorus. Partial gastric outlet obstruction is not excluded. 4. Bilateral dilated ureters without obstructing calculi. Urinary bladder is markedly distended with bladder wall air suggesting emphysematous cystitis. Dilatation of the ureters may be related to reflux or infection. 5. Atherosclerotic disease. 6. Other findings as above. Critical results were called by Dr. Madeline Case MD to [address] on 4/11/2019 at 17:37. Xr Chest Portable    Result Date: 4/16/2019  EXAMINATION: SINGLE XRAY VIEW OF THE CHEST 4/16/2019 6:54 am COMPARISON: 04/15/2019, 610 hours HISTORY: ORDERING SYSTEM PROVIDED HISTORY: ETT placement TECHNOLOGIST PROVIDED HISTORY: ETT placement Ordering Physician Provided Reason for Exam: on vent Acuity: Acute Type of Exam: Initial 70-year-old female on ventilator; check endotracheal tube placement FINDINGS: Portable AP upright view of the chest. Endotracheal tube distal tip overlying the mid trachea approximately 4.1 cm above the level of the ron.  Enteric tube traverses the GE junction with distal tip excluded from the field of view. Left subclavian approach cardiac pacemaker device distal lead tips relatively stable in position. Right internal jugular approach central venous catheter distal tip overlying the high right atrium, stable. Cardiac monitor leads overlie the chest. Atherosclerotic calcification of the thoracic aorta. Slight stable volume loss of the left hemithorax. No pneumothorax. No free air. Dense retrocardiac/left basilar airspace consolidation and small left-sided pleural effusion. Stable mild focal opacity at the right mid lung zone. Underlying COPD. Stable mild pulmonary vascular congestion and left-sided predominant parahilar opacity. Visualized osseous structures remain unchanged. 1. Stable multifocal airspace disease as detailed above with dense retrocardiac/left basilar airspace consolidation and small left-sided pleural effusion. Mild pulmonary vascular congestion. Findings may represent edema or multifocal pneumonia. 2. Underlying COPD. 3. Tubes and line as detailed above. Xr Chest Portable    Result Date: 4/15/2019  EXAMINATION: SINGLE XRAY VIEW OF THE CHEST 4/15/2019 6:47 am COMPARISON: 14 April 2019 HISTORY: ORDERING SYSTEM PROVIDED HISTORY: ETT placement TECHNOLOGIST PROVIDED HISTORY: ETT placement Ordering Physician Provided Reason for Exam: on vent Acuity: Acute Type of Exam: Initial FINDINGS: AP portable view of the chest time stamped at 612 hours demonstrates overlying cardiac monitoring electrodes. Endotracheal tube terminates 4 cm above the ron. Bipolar pacemaker enters from the left with intact leads in appropriate positions. Intestinal tube extends beyond the fundus of the stomach, tip not included. Right internal jugular catheter terminates at the cavoatrial junction. Heart size is normal.  Aortic arch is calcified. There is interval improvement in vascular congestion with resolution of perihilar opacities.   Some bibasilar opacities remain. No extrapleural air is noted. Osseous structures are stable. Interval improvement in vascular congestion and bilateral opacities consistent with resolving pulmonary edema. Tubes and lines as above. Xr Chest Portable    Result Date: 4/14/2019  EXAMINATION: SINGLE XRAY VIEW OF THE CHEST 4/14/2019 7:57 am COMPARISON: Portable chest 04/13/2019. HISTORY: ORDERING SYSTEM PROVIDED HISTORY: Intubation TECHNOLOGIST PROVIDED HISTORY: Intubation Ordering Physician Provided Reason for Exam: intubation Acuity: Acute Type of Exam: Initial Additional signs and symptoms: intubation FINDINGS: Endotracheal tube terminates over the midthoracic trachea. Dual-chamber pacemaker leads appear unchanged in position. Right IJ approach central venous catheter unchanged in position. Heart size not substantially changed. Perihilar and basilar opacities further increased. Left pleural effusion increased in size. Findings may reflect pulmonary edema, progressed from yesterday's exam.  Left pleural effusion increased in size. Xr Chest Portable    Result Date: 4/12/2019  EXAMINATION: SINGLE XRAY VIEW OF THE CHEST 4/12/2019 5:26 am COMPARISON: November 14, 2015. HISTORY: ORDERING SYSTEM PROVIDED HISTORY: line placement TECHNOLOGIST PROVIDED HISTORY: line placement Ordering Physician Provided Reason for Exam: New right side line placement. Acuity: Acute Type of Exam: Initial Additional signs and symptoms: New right side line placement. FINDINGS: Stable left pectoral trans venous cardiac pacer device. New right IJ central venous catheter with tip near the superior atrial caval junction. Normal lung volume. No new consolidation. Curvilinear radiopacity projecting over the right upper lobe likely represents artifact from a skin fold. No pleural effusion or pneumothorax. Stable cardiomediastinal silhouette and great vessels with redemonstration of atherosclerotic thoracic aorta.      New right IJ central

## 2019-05-08 NOTE — PROGRESS NOTES
Dr. Anabella Romero notified of low HG 6.2.  RN had a long discussion with Valentino Dove and she is now willing to accept a blood transfusion. Valentino Dove also states that she will be willing to have IR insert a NG tube, as long as she does not feel it.

## 2019-05-08 NOTE — PROGRESS NOTES
[address] [place] [address] [address]   INPATIENT OCCUPATIONAL THERAPY  PROGRESS NOTE  Date: 2019  Patient Name: Erin Damon      Room: 2123-01  MRN: [id number]    : 1950  (77 y.o.) Gender: female     Discharge Recommendations:  [address]       Referring Practitioner: Ambreen Alvarez MD  Diagnosis: Sepsis due to UTI  General  Chart Reviewed: Yes  Patient assessed for rehabilitation services?: Yes  Referring Practitioner: Ambreen Alvarez MD  Diagnosis: Sepsis due to UTI    Restrictions  Restrictions/Precautions: Fall Risk  Implants present? : Pacemaker  Other position/activity restrictions: LLE cast is removed       Subjective \"MaMa, I'm thirsty, Help\"  Patient Currently in Pain: (does not answer question )  Overall Orientation Status: Within Functional Limits     Pain Assessment  Pain Level: 10  Pain Type: Chronic pain(general pain )  Pain Location: Leg  Pain Orientation: Left    Objective Patient resting hr/ 112-120. Agreed to groom in bed. Sporadically answers questions and crying out. Nsg aware. Continue POC                   ADL  Grooming: Setup(while laying supine in bed )                                   Assessment  Performance deficits / Impairments: Decreased functional mobility ; Decreased ADL status; Decreased ROM; Decreased strength;Decreased endurance;Decreased coordination  Assessment: Pt will benefit from OT to improve pt's participation and independence w ADLs, improve bilateral UE strength & UB endurance to assist w selfcare. Prognosis: Fair  Activity Tolerance: Patient limited by fatigue;Patient limited by pain  Safety Devices in place: Yes  Type of devices: Left in bed;Call light within reach; Patient at risk for falls             Patient Education:     Learner:patient  Method: explanation       Outcome: demonstrated understanding     Plan  Safety Devices  Safety Devices in place: Yes  Type of devices: Left in bed, Call light within

## 2019-05-08 NOTE — PROGRESS NOTES
[facility]    Gastroenterology Daily Progress Note      Patient:   Erin Damon   :    1950   Facility:   [facility]  Date:     2019  Consultant:   Tova Alvarez, CNP      SUBJECTIVE  77 y.o. female admitted 2019 with Sepsis due to urinary tract infection ([facility] [address] [A41.9, N39.0]  Cystitis [N30.90]  Cystitis [N30.90] and seen for bright red blood per rectum and SBO. The pt was seen and examined. She is currently very sleepy and is not answering questions; RN states that she just received Ativan. No reports of any bowel movements last night. Her hgb is 7.2. She continues to refuse treatments and any surgery; she only did part of the psych eval yesterday. No emesis but she has had low grade fevers overnight.         OBJECTIVE  Scheduled Meds:   sodium chloride  250 mL Intravenous Once    sodium chloride  250 mL Intravenous Once    predniSONE  10 mg Oral Daily    mometasone-formoterol  2 puff Inhalation BID    pantoprazole  40 mg Oral QAM AC    metoprolol tartrate  12.5 mg Oral BID    fat emulsion  100 mL Intravenous Daily    furosemide  40 mg Intravenous Daily    magnesium sulfate  1 g Intravenous Once    ipratropium  0.5 mg Nebulization Q4H    insulin lispro  0-12 Units Subcutaneous Q6H    levalbuterol  1.25 mg Nebulization Q4H    venlafaxine  37.5 mg Oral TID    aspirin  81 mg Oral Daily    sodium chloride flush  10 mL Intravenous 2 times per day       Vital Signs:  /61   Pulse 105   Temp 99 °F (37.2 °C) (Axillary)   Resp 18   Ht 5' (1.524 m)   Wt 108 lb 0.4 oz (49 kg)   SpO2 98%   BMI 21.10 kg/m²      Physical Exam:   General appearance: sleepy, ill appearing just received IV ativan per RN, NAD  Lungs: CTA bilaterally    Heart: S1S2 RRR  Abdomen: Soft, Nontender, Not distended, BS hypoactive x4 cholecystostomy tube with bile drainage  Skin: No jaundice, No clubbing, No cyanosis    Lab and Imaging Review     CBC  Recent Labs     19  2863 Range: 0 - 35 U/mL 49 (H)   CEA Latest Ref Range: <3.9 ng/mL 5.6 (H)    Results for Charlotte Evangelista (MRN [id number]) as of 5/4/2019 12:26    Ref. Range 4/15/2019 11:10   AFP (Alpha Fetoprotein) Latest Ref Range: <8.4 ug/L 1.8      FINDINGS:hida 4/20/19   Prompt, homogenous uptake by the liver is noted with normal appearance of   radiotracer excretion into the biliary system.  Clearance of bloodpool   activity appears appropriate.       Normal small bowel activity is seen.  Prominent activity within the common   bile duct.  The gallbladder is not visualized by the end of the exam.           Impression   Absent filling of the gallbladder consistent with acute cholecystitis.         FINDINGS:ct abd 4/14/19   Lower Chest: New moderate layering bilateral pleural effusions with bilateral   lower lobe atelectasis.       Organs: Limited evaluation due lack of intravenous contrast.  Cholelithiasis   redemonstrated.  No gallbladder wall thickening or biliary ductal dilatation. Scattered tiny hypodense lesions in the liver are too small to characterize   but statistically represent benign cysts or hemangiomas and appear unchanged. The pancreas, spleen, adrenal glands, and kidneys are unremarkable. Tova Peaches is   no hydronephrosis or urinary tract calculus.       GI/Bowel: The stomach is distended.  Enteric tube is in place.  No contrast   is seen distal to the pylorus and there is contrast reflux into the distal   esophagus. Tova Peaches is no evidence of bowel obstruction.  The appendix is not   definitely visualized.  No focal pericecal inflammatory changes are evident.       Pelvis: The urinary bladder is decompressed by Tran catheter.  No pelvic   mass is seen.       Peritoneum/Retroperitoneum: Small amount of free fluid in the pelvic cavity.    No free air or focal fluid collection.  No abnormal lymph node.  Normal   abdominal aortic caliber.  Moderate calcific atherosclerosis.       Bones/Soft Tissues: No acute osseous abnormality.  Diffuse anasarca. Moderate degenerative changes in the lumbar spine.           Impression   1. Distended, contrast filled stomach with reflux of contrast into the   esophagus.  No enteric contrast is seen distal to the pylorus suggesting   delayed gastric emptying in the setting of ileus. 2. New moderate layering bilateral pleural effusions with bilateral lower   lobe atelectasis. 3. Small amount of nonspecific free fluid in the pelvic cavity. 4. Anasarca. 5. Cholelithiasis.        FINDINGS:GB US 4/19/19   Pancreas is not well visualized.       No liver lesion.       Gallbladder wall thickening.  Gallbladder sludge.  Cholelithiasis.    Pericholecystic fluid.       Common bile duct measures at the upper limits of normal at 7 mm.           Impression   Findings suggesting acute cholecystitis.      ESOPHAGOGASTRODUODENOSCOPY   ( EGD )  DATE OF PROCEDURE: 4/16/2019      Wilber Curtis MD        POSTOPERATIVE Ninfa Anand has esophagitis.     Has a large amount of retained solid food in the upper part of the stomach and fundus.  Antrum is clear.  Pylorus wide open and did not show signs of stenosis.     OPERATION: Upper GI endoscopy          Findings:     Retropharyngeal area was grossly normal appearing     Esophagus: abnormal: Has a grade 2 esophagitis.  Appears peptic esophagitis.  Has a small sliding hiatal hernia.     Stomach:  Fundus of the stomach and body of the stomach.  With solid food.  75% of the lumen is occupied with solid food.     Antrum: No retained food.  Able to advance the scope through the pylorus without difficulty.  No pyloric stenosis seen.  No signs of outlet obstruction.     Duodenum:     Descending: normal    Bulb: normal  Minimal liquid present in the 2nd part of the duodenum.     FINDINGS:5/3/19 NM bleeding scan   Activity is seen within the blood pool, including the great vessels, heart,   liver, and spleen.  There was no abnormal accumulation of signed by: AGAPITO Magallanes CNP on 5/8/2019 at 10:04 AM     Attending Physician Statement  I have discussed the care of Jonas Grady and   I have examined the patient myselft and taken ros and hpi , including pertinent history and exam findings,  with the author of this note . I have reviewed the key elements of all parts of the encounter with the nurse practitioner/resident.     I agree with the assessment, plan and orders as documented by the above health care provider     Electronically signed by Cassie Tenorio MD

## 2019-05-08 NOTE — PROGRESS NOTES
Nutrition Assessment (Parenteral Nutrition)    Type and Reason for Visit: Reassess    Nutrition Recommendations: Following changes made in additives: K acetate- 20 mEq, KCl- 30 to 35 mEq, Mg- 19 to 21 mEq, add MVI & trace elements. Nutrition Assessment: Pt apparently was allowing NG placement but RN note indicated after several tries unable to insert; TPN and lipids continue. Malnutrition Assessment:  · Malnutrition Status: At risk for malnutrition  · Context: Acute illness or injury  · Findings of the 6 clinical characteristics of malnutrition (Minimum of 2 out of 6 clinical characteristics is required to make the diagnosis of moderate or severe Protein Calorie Malnutrition based on AND/ASPEN Guidelines):  1. Energy Intake-Less than or equal to 75% of estimated energy requirement(Previously; improving with parenteral nutrition), Greater than or equal to 5 days    2. Weight Loss-Unable to assess(edema present),    3. Fat Loss-Unable to assess,    4. Muscle Loss-Unable to assess,    5. Fluid Accumulation-Mild fluid accumulation, Extremities  6.  Strength-Not measured    Nutrition Risk Level: High    Nutrient Needs:  · Estimated Daily Total Kcal: 9814-2231 based on wt of 25-27 per kg using wt of 57.2 kg  · Estimated Daily Protein (g): 63-68 based on 1.4-1.5 gm/kg IBW(revised)    Nutrition Diagnosis:   · Problem: Inadequate oral intake  · Etiology: related to Alteration in GI function, Insufficient energy/nutrient consumption     Signs and symptoms:  as evidenced by NPO status due to medical condition, Nutrition support - PN, GI abnormality    Objective Information:  · Nutrition-Focused Physical Findings: No appetite. Abdominal pain and occult blood.  Edema: +1 RUE, +2 LUE, +1 RLE, +2 LLE   · Wound Type: Multiple, Pressure Ulcer, Deep Tissue Injury(Wound under cast)  · Current Nutrition Therapies:  · Oral Diet Orders: NPO   · Oral Diet intake: NPO  · Oral Nutrition Supplement (ONS) Orders: None  · ONS intake: NPO  · Parenteral Nutrition Orders:  · Type and Formula: Premix Central, 2-in-1 Custom   · Lipids: 100ml, Daily  · Rate/Volume: 65 ml/ 1560 ml daily  · Duration: Continuous  · Current PN Order Provides: 1573 kcals, 78 gm of protein (lipids included)  · Goal PN Orders Provides: 8133-3945 kcal; 63-68 gm pro  · Additional Calories: None  · Anthropometric Measures:  · Ht: 5' (152.4 cm)   · Current Body Wt: 108 lb (49 kg)  · Admission Body Wt: 102 lb (46.3 kg)  · Ideal Body Wt: 100 lb (45.4 kg), % Ideal Body 108%  · BMI Classification: BMI 18.5 - 24.9 Normal Weight(Based on admission wt)    Nutrition Interventions:   Continue NPO, Modify current Parenteral Nutrition  Continued Inpatient Monitoring, Education Not Indicated    Nutrition Evaluation:   · Evaluation: Progressing toward goals   · Goals: PN to meet greater 90% of estimated nutrition needs   · Monitoring: Nutrition Progression, Meal Intake, Weight, Pertinent Labs, Monitor Bowel Function, PN Intake, PN Tolerance, Diet Tolerance, Skin Integrity, I&O      Mary CarmenALL Canales R.D.   Clinical Dietitian  Office: [phone]

## 2019-05-08 NOTE — PLAN OF CARE
Problem: Risk for Impaired Skin Integrity  Goal: Tissue integrity - skin and mucous membranes  Outcome: Ongoing  Note:   Turning and repositioning every two hours, heels elevated, encouraging activity status as ordered, continuing to monitor for skin impairment risk. Problem: Falls - Risk of:  Goal: Will remain free from falls  Outcome: Ongoing  Note:   No falls noted     Problem: Risk for Impaired Skin Integrity  Goal: Tissue integrity - skin and mucous membranes  Outcome: Ongoing  Note:   Turning and repositioning every two hours, heels elevated, encouraging activity status as ordered, continuing to monitor for skin impairment risk. Problem: Falls - Risk of:  Goal: Will remain free from falls  Outcome: Ongoing  Note:   No falls noted  Goal: Absence of physical injury  Outcome: Ongoing     Problem: Infection, Septic Shock:  Goal: Will show no infection signs and symptoms  Outcome: Ongoing     Problem: Tissue Perfusion, Altered:  Goal: Circulatory function within specified parameters  Outcome: Ongoing     Problem: Pain:  Goal: Pain level will decrease  Outcome: Ongoing  Note:   Pt medicated with pain medication prn. Assessed all pain characteristics including level, type, location, frequency, and onset. Non-pharmacologic interventions offered to pt as well. Pt states pain is tolerable at this time.  Will continue to monitor   Goal: Control of acute pain  Outcome: Ongoing  Goal: Control of chronic pain  Outcome: Ongoing     Problem: Nutrition  Goal: Optimal nutrition therapy  5/8/2019 1928 by Jaci Craig RN  Outcome: Ongoing

## 2019-05-08 NOTE — PROGRESS NOTES
Pulmonary Progress Note  [facility]      Patient - William Patterson,  Age - 77 y.o.    - 1950      Room Number - 2123/2123-01   [facility] -  [id number]   [facility]  Date of Admission -  2019  2:48 PM        Consulting Nahid Ayoub MD  Primary Care Physician - Amber Cheney, DO     SUBJECTIVE   Events noted, patient denies any dyspnea, apparently has agreed to a NG tube now    OBJECTIVE   VITALS    height is 5' (1.524 m) and weight is 108 lb 0.4 oz (49 kg). Her oral temperature is 98.6 °F (37 °C). Her blood pressure is 117/68 and her pulse is 123. Her respiration is 18 and oxygen saturation is 97%. Body mass index is 21.1 kg/m². Temperature Range: Temp: 98.6 °F (37 °C) Temp  Av.5 °F (36.9 °C)  Min: 97.3 °F (36.3 °C)  Max: 99.5 °F (37.5 °C)  BP Range:  Systolic (08TNV), VOK:214 , Min:104 , ISO:435     Diastolic (85DLT), KJD:91, Min:61, Max:80    Pulse Range: Pulse  Av.7  Min: 100  Max: 123  Respiration Range: Resp  Av.1  Min: 16  Max: 18  Current Pulse Ox[de-identified]  SpO2: 97 %  24HR Pulse Ox Range:  SpO2  Av.7 %  Min: 97 %  Max: 99 %  Oxygen Amount and Delivery: O2 Flow Rate (L/min): 0 L/min    Wt Readings from Last 3 Encounters:   19 108 lb 0.4 oz (49 kg)   19 89 lb (40.4 kg)   19 94 lb 1.6 oz (42.7 kg)       I/O (24 Hours)    Intake/Output Summary (Last 24 hours) at 2019 1355  Last data filed at 2019 1341  Gross per 24 hour   Intake 2031.92 ml   Output 3525 ml   Net -1493.08 ml       EXAM     General Appearance sleeping but easily arousable, thin and frail in no acute distress  HEENT - normocephalic, atraumatic.  []  Mallampati  [] Crowded airway   [] Macroglossia  []  Retrognathia  [] Micrognathia  []  Normal tongue size []  Normal Bite  [] Gallatin sign positive    Neck - Supple,  trachea midline   Lungs - diminished but clear  Cardiovascular - Heart sounds CALCULATED 05/08/2019     ABGs:  Lab Results   Component Value Date    PHART 7.519 04/19/2019    PO2ART 73.3 04/19/2019    GAS5HXH 42.5 04/19/2019      Lab Results   Component Value Date    MODE PRVC 04/19/2019     Ionized Calcium:  No results found for: IONCA  Magnesium:    Lab Results   Component Value Date    MG 1.6 05/08/2019     Phosphorus:    Lab Results   Component Value Date    PHOS 3.1 05/08/2019        LIVER PROFILE   Recent Labs     05/08/19 0515   AST 16   ALT 15   BILITOT 0.26*   ALKPHOS 178*     INR   Recent Labs     05/08/19 0515   INR 1.3     PTT   Lab Results   Component Value Date    APTT 27.7 03/28/2012         RADIOLOGY     (See actual reports for details)    ASSESSMENT/PLAN   Principal Problem:    GI bleed  Active Problems:    Sepsis due to urinary tract infection (HCC)    Cystitis    Emphysematous cystitis    Septic shock (HCC)    Leukemoid reaction    Aspiration pneumonia of right lower lobe due to vomit (HCC)    MRSA carrier    Urinary retention    Abdominal distention    Elevated CEA    Elevated CA 19-9 level    Cholecystitis    CRP elevated    Elevated procalcitonin    Bandemia    Centrilobular emphysema (HCC)    Rectal bleeding    Anemia    Small bowel obstruction (HCC)    Anxiety disorder    Noncompliance  Resolved Problems:    * No resolved hospital problems.  *    Decrease frequency of aerosols to 4 times a day  Advise using Afrin to help place NG tube  Watch for oversedation  No family present  Electronically signed by Jaime Perrin MD on 5/8/2019 at 1:55 PM

## 2019-05-08 NOTE — PROGRESS NOTES
Patient c/o abdominal and \" all over pain\". RN again explained why her stomach hurts so bad. KUB shows an ileus. Patient is asking over and over for water. RN explained again why she is not allowed to even have water. After a detailed explanation, the patient stated to RN that she is willing to have a NG tube placed if that is want the doctors want done. RN paged GI and surgery and is  awaiting their response.

## 2019-05-08 NOTE — CARE COORDINATION
ONGOING DISCHARGE PLAN:    Reviewed patients chart regarding discharge plan and chart still confirms the plan is to discharge [facility]  Per Pulm     Decrease frequency of aerosols to 4 times a day  Advise using Afrin to help place NG tube  Watch for oversedation  No family present        Will review plan with patient and or family    Still waiting on a peer to peer decision   Will continue to follow for additional discharge needs.      Electronically signed by Charla Pichardo RN on 5/8/2019 at 2:21 PM

## 2019-05-08 NOTE — PROGRESS NOTES
Patient again screaming for water. RN had another discussion with patient and she is now willing to try a smaller NG tube. RN saw GI NP in the hallway and updated her and she stated to use either a 12 or 14 NG tube and to use ocean nasal spray prior NG insertion. She also stated to use low intermittent suction when NG is placed successfully.

## 2019-05-08 NOTE — PROGRESS NOTES
Mini-mental status exam completed per psych recommendations. Patient scored 22/30. Results on chart.

## 2019-05-08 NOTE — PROGRESS NOTES
Physical Therapy  DATE: 2019    NAME: Chet Chang  MRN: [id number]   : 1950    Patient not seen this date for Physical Therapy due to:  [] Blood transfusion in progress  [] Hemodialysis  []  Patient Declined  [] Spine Precautions   [] Strict Bedrest  [] Surgery/ Procedure  [] Testing      [x] Other Pt refuses PT despite encouragement. VICKY Granger Begun informed. [] PT being discontinued at this time. Patient independent. No further needs. [] PT being discontinued at this time as the patient has been transferred to palliative care. No further needs.     Edilma aCrtwright, PTA

## 2019-05-08 NOTE — PROGRESS NOTES
[address] [facility].    PROGRESS NOTE             5/8/2019    7:17 AM    Name:   Alverto Stark  MRN:     [id number]     Acct:      [de-identified]   Room:   [id number] Day:  32  Admit Date:  4/11/2019  2:48 PM    PCP:  Shelly Woody DO  Code Status:  Limited    Subjective:     C/C:   Chief Complaint   Patient presents with    Abdominal Pain     Interval History Status: not changed. Pt seen and examined bedside, no acute events overnight. Laying in bed in no acute distress. Only complaints are feeling very tired and mouth is dry. Discussed why pt does not want to have treatments, states she doesn't know why, just does not want to and does not know what will happen. No corrina blood in BM last night. Denies fever, chills, CP or SOB. Brief History:     The patient is a 68 y.o.  presents sepsis secondary to acute cystitis.     Review of Systems:     Review of Systems   Constitutional: Positive for fatigue. Negative for chills, diaphoresis and fever. Respiratory: Negative for cough, choking, shortness of breath and wheezing. Cardiovascular: Negative for chest pain, palpitations and leg swelling. Gastrointestinal: Negative for abdominal pain, blood in stool, constipation, diarrhea, nausea and vomiting. Genitourinary: Negative for dysuria, frequency and pelvic pain. Musculoskeletal: Negative for back pain, myalgias and neck pain. Neurological: Negative for dizziness, speech difficulty, light-headedness and headaches. Psychiatric/Behavioral: Positive for agitation. Negative for confusion and suicidal ideas. The patient is not nervous/anxious. Medications: Allergies:     Allergies   Allergen Reactions    Penicillins Shortness Of Breath and Rash    Amoxicillin        Current Meds:   Scheduled Meds:    sodium chloride  250 mL Intravenous Once    sodium chloride  250 mL Intravenous Once    predniSONE  10 mg Oral Daily    mometasone-formoterol  2 puff Inhalation BID    pantoprazole  40 mg Oral QAM AC    metoprolol tartrate  12.5 mg Oral BID    fat emulsion  100 mL Intravenous Daily    furosemide  40 mg Intravenous Daily    magnesium sulfate  1 g Intravenous Once    ipratropium  0.5 mg Nebulization Q4H    insulin lispro  0-12 Units Subcutaneous Q6H    levalbuterol  1.25 mg Nebulization Q4H    venlafaxine  37.5 mg Oral TID    aspirin  81 mg Oral Daily    sodium chloride flush  10 mL Intravenous 2 times per day     Continuous Infusions:    PN-Adult 2-in-1 Central Line (Standard) 65 mL/hr at 19 1806    sodium chloride 10 mL/hr at 19 1240    dextrose       PRN Meds: sodium chloride flush, [Held by provider] morphine, LORazepam, ondansetron, zolpidem, sodium chloride flush, hydrOXYzine, metoprolol, sodium chloride nebulizer, fentanNYL, oxyCODONE-acetaminophen, magnesium sulfate, sodium phosphate IVPB **OR** sodium phosphate IVPB, potassium chloride **OR** potassium alternative oral replacement **OR** potassium chloride, glucose, dextrose, glucagon (rDNA), dextrose, milk and molasses, sodium chloride flush, acetaminophen    Data:     Past Medical History:   has a past medical history of Abnormal computed tomography of cervical spine, CVA (cerebral vascular accident) ([address] GERD (gastroesophageal reflux disease), Hypertension, Paraproteinemia, and Weight loss. Social History:   reports that she has been smoking. She has a 30.00 pack-year smoking history. She has never used smokeless tobacco. She reports that she drank about 16.8 oz of alcohol per week. She reports that she does not use drugs. Family History: History reviewed. No pertinent family history.     Vitals:  /80   Pulse 104   Temp 99.5 °F (37.5 °C) (Axillary)   Resp 18   Ht 5' (1.524 m)   Wt 108 lb 0.4 oz (49 kg)   SpO2 97%   BMI 21.10 kg/m²   Temp (24hrs), Av.3 °F (36.8 °C), Min:97 °F (36.1 °C), Max:99.7 °F (37.6 °C)    Recent Labs     05/07/19  0713 05/07/19  1148 05/07/19  1841 05/07/19  2049   POCGLU 112* 119* 115* 118*       I/O(24Hr): Intake/Output Summary (Last 24 hours) at 5/8/2019 0717  Last data filed at 5/8/2019 8947  Gross per 24 hour   Intake 2031.92 ml   Output 2075 ml   Net -43.08 ml       Labs:    Lab Results   Component Value Date    WBC 12.1 (H) 05/08/2019    HGB 7.2 (L) 05/08/2019    HCT 22.4 (L) 05/08/2019    MCV 88.5 05/08/2019     05/08/2019     Lab Results   Component Value Date     05/08/2019    K 3.8 05/08/2019     05/08/2019    CO2 23 05/08/2019    BUN 19 05/08/2019    CREATININE <0.40 (L) 05/08/2019    GLUCOSE 120 (H) 05/08/2019    CALCIUM 7.3 (L) 05/08/2019    PROT 4.9 (L) 05/08/2019    LABALBU 1.9 (L) 05/08/2019    BILITOT 0.26 (L) 05/08/2019    ALKPHOS 178 (H) 05/08/2019    AST 16 05/08/2019    ALT 15 05/08/2019    LABGLOM CANNOT BE CALCULATED 05/08/2019    GFRAA CANNOT BE CALCULATED 05/08/2019    GLOB NOT REPORTED 04/18/2019           Lab Results   Component Value Date/Time    SPECIAL NOT REPORTED 04/23/2019 10:10 PM     Lab Results   Component Value Date/Time    CULTURE (A) 04/22/2019 04:59 PM     YEAST, NOT CANDIDA ALBICANS OR CANDIDA DUBLINIENSIS SCANT GROWTH    CULTURE (A) 04/22/2019 04:59 PM     KLEBSIELLA PNEUMONIAE ONE COLONY FORMING UNIT THIS ORGANISM IS AN EXTENDED-SPECTRUM BETA-LACTAMASE  AND RESISTANCE TO THERAPY WITH PENICILLINS, CEPHALOSPORINS AND AZTREONAM IS EXPECTED. THESE ORGANISMS GENERALLY REMAIN SUSCEPTIBLE TO CARBAPENEMS. CONSIDER ID CONSULTATION.     CULTURE NO ANAEROBIC ORGANISMS ISOLATED AT 5 DAYS (A) 04/22/2019 04:59 PM       [facility]    Radiology:    Ct Abdomen Pelvis Wo Contrast Additional Contrast? Oral    Result Date: 4/14/2019  EXAMINATION: CT OF THE ABDOMEN AND PELVIS WITHOUT CONTRAST 4/14/2019 7:34 pm TECHNIQUE: CT of the abdomen and pelvis was performed without the administration of intravenous contrast. Multiplanar reformatted images are provided for review. Dose modulation, iterative reconstruction, and/or weight based adjustment of the mA/kV was utilized to reduce the radiation dose to as low as reasonably achievable. COMPARISON: 04/11/2019 HISTORY: ORDERING SYSTEM PROVIDED HISTORY: ABDOMINAL PAIN TECHNOLOGIST PROVIDED HISTORY: Water soluble contrast only please Ordering Physician Provided Reason for Exam: Abdominal pain - Vented patient. Contrast given via nurse through NG tube. Acuity: Unknown Type of Exam: Unknown Relevant Medical/Surgical History: Hx - Sepsis due to urinary tract infection. FINDINGS: Lower Chest: New moderate layering bilateral pleural effusions with bilateral lower lobe atelectasis. Organs: Limited evaluation due lack of intravenous contrast.  Cholelithiasis redemonstrated. No gallbladder wall thickening or biliary ductal dilatation. Scattered tiny hypodense lesions in the liver are too small to characterize but statistically represent benign cysts or hemangiomas and appear unchanged. The pancreas, spleen, adrenal glands, and kidneys are unremarkable. There is no hydronephrosis or urinary tract calculus. GI/Bowel: The stomach is distended. Enteric tube is in place. No contrast is seen distal to the pylorus and there is contrast reflux into the distal esophagus. There is no evidence of bowel obstruction. The appendix is not definitely visualized. No focal pericecal inflammatory changes are evident. Pelvis: The urinary bladder is decompressed by Tran catheter. No pelvic mass is seen. Peritoneum/Retroperitoneum: Small amount of free fluid in the pelvic cavity. No free air or focal fluid collection. No abnormal lymph node. Normal abdominal aortic caliber. Moderate calcific atherosclerosis. Bones/Soft Tissues: No acute osseous abnormality. Diffuse anasarca. Moderate degenerative changes in the lumbar spine. 1. Distended, contrast filled stomach with reflux of contrast into the esophagus.   No enteric contrast is seen distal to the pylorus suggesting delayed gastric emptying in the setting of ileus. 2. New moderate layering bilateral pleural effusions with bilateral lower lobe atelectasis. 3. Small amount of nonspecific free fluid in the pelvic cavity. 4. Anasarca. 5. Cholelithiasis. Xr Chest (single View Frontal)    Result Date: 5/2/2019  EXAMINATION: SINGLE XRAY VIEW OF THE CHEST 5/2/2019 6:34 am COMPARISON: 29 April 2019 HISTORY: ORDERING SYSTEM PROVIDED HISTORY: COPD TECHNOLOGIST PROVIDED HISTORY: COPD Ordering Physician Provided Reason for Exam: COPD Acuity: Acute Type of Exam: Initial FINDINGS: AP portable view of the chest time stamped at 630 hours demonstrates stable cardiac size. Overlying cardiac monitoring electrodes are present. Right-sided PICC line terminates in the right atrium. Bipolar pacemaker enters from the left with intact leads in appropriate positions. There is been no significant interval change in bilateral interstitial opacities, representing interval change since 2015. This may be related to worsening interstitial disease or superimposed venous congestion. Bilateral effusions are noted with bibasilar opacities, either atelectasis or edema. Continued bilateral effusions, bibasilar opacities and bilateral interstitial opacities in the upper lobes. Findings may be related to worsening interstitial disease and edema. Airspace disease is not excluded. Xr Chest (single View Frontal)    Result Date: 4/13/2019  EXAMINATION: SINGLE XRAY VIEW OF THE CHEST 4/13/2019 7:18 am COMPARISON: April 12, 2019 HISTORY: ORDERING SYSTEM PROVIDED HISTORY: dyspnea TECHNOLOGIST PROVIDED HISTORY: dyspnea Ordering Physician Provided Reason for Exam: dyspnea Acuity: Acute Type of Exam: Subsequent/Follow-up Additional signs and symptoms: dyspnea FINDINGS: A right IJ catheter is seen with its tip terminating at the superior cavoatrial junction. The left chest wall pacemaker and leads are stable. The cardiomediastinal silhouette is stable. There is interval increased opacity at the right lung base, may be related to atelectasis versus pneumonia. There is small atelectasis and mild pleural effusion at the left lung base. There is no pneumothorax. There is no acute osseous abnormality. Interval increased opacity at the right lung base, may be related to atelectasis versus pneumonia. Small atelectasis and mild pleural effusion at the left lung, new since the prior study. Xr Chest Standard (2 Vw)    Result Date: 4/29/2019  EXAMINATION: TWO VIEWS OF THE CHEST 4/29/2019 8:04 pm COMPARISON: 04/27/2019 HISTORY: ORDERING SYSTEM PROVIDED HISTORY: Follow-up lung infiltrate TECHNOLOGIST PROVIDED HISTORY: Follow-up lung infiltrate Ordering Physician Provided Reason for Exam: Follow-up infiltrate. Pt unable to lean forward - unable to get proper sponge behind pt for lateral. Pt unable to raise left arm at all for lateral. Best possible lateral obtained. Acuity: Unknown Type of Exam: Unknown Additional signs and symptoms: Follow-up infiltrate. Pt unable to lean forward - unable to get proper sponge behind pt for lateral. Pt unable to raise left arm at all for lateral. Best possible lateral obtained. FINDINGS: Left chest cardiac pacemaker device is in place. Right IJ central venous catheter in place with distal tip at the cavoatrial junction. Heart size is within normal limits. No vascular congestion. There are small to moderate pleural effusions. There are interstitial opacities in the upper lungs, which could be related to scarring and/or developing infiltrate, slightly improved in appearance when compared to 04/27/2019. No evidence of pneumothorax. 1.  Small to moderate bilateral pleural effusions, better visualized on lateral view. 2.  Upper lobe interstitial opacities, slightly improved when compared to the previous study, possibly related to underlying fibrotic change or residual infiltrate. Xr Abdomen (kub) (single Ap View)    Result Date: 4/19/2019  EXAMINATION: SINGLE SUPINE XRAY VIEW(S) OF THE ABDOMEN 4/19/2019 9:48 am COMPARISON: April 14, 2019 HISTORY: ORDERING SYSTEM PROVIDED HISTORY: pain TECHNOLOGIST PROVIDED HISTORY: pain Ordering Physician Provided Reason for Exam: Abdominal pain and distentsion Acuity: Acute Type of Exam: Initial Additional signs and symptoms: Abdominal pain and distentsion FINDINGS: Enteric tube with the tip within the stomach. Small left pleural effusion. Minimal right pleural effusion. Mildly dilated loops of bowel. Nonspecific bowel gas pattern with mildly dilated loops of bowel. Xr Abdomen (kub) (single Ap View)    Result Date: 4/14/2019  EXAMINATION: SINGLE SUPINE XRAY VIEW(S) OF THE ABDOMEN 4/14/2019 7:39 am COMPARISON: CT abdomen and pelvis  film from 11 April 2019 HISTORY: [address]: Abd Distention TECHNOLOGIST PROVIDED HISTORY: Abd Distention Ordering Physician Provided Reason for Exam: Abdominal distention. Pt was moving around in the bed. Best films at present time. Acuity: Acute Type of Exam: Initial Additional signs and symptoms: Abdominal distention. Pt was moving around in the bed. Best films at present time. FINDINGS: Portable view time stamped at 748 hours demonstrates an intestinal tube terminating in the midportion of a gaseous Spike dilated stomach. Densities are present over the stomach likely medication. Bipolar pacemaker is in situ with intact leads. Heart size is top-normal, stable. Gaseous distension of the stomach and loop of bowel in the upper mid abdomen is noted but there is gas and fecal material in the rectum. Gastric outlet obstruction or proximal small bowel partial obstruction is suspected. No free air is noted. Midline city is present over the pelvis likely a monitor or CT small bore catheter. Vascular calcification is present in the pelvis.      Persistent preferential gaseous distension of the stomach although there is some gas in the upper abdomen and gas and fecal material present in the rectosigmoid. Findings suggest gastric outlet obstruction. Ct Abdomen W Contrast Additional Contrast? Radiologist Recommendation    Result Date: 5/5/2019  EXAMINATION: CT ABDOMEN WITH CONTRAST, 5/5/2019 3:38 pm TECHNIQUE: CT of the abdomen was performed with the administration of intravenous contrast. Multiplanar reformatted images are provided for review. Dose modulation, iterative reconstruction, and/or weight based adjustment of the mA/kV was utilized to reduce the radiation dose to as low as reasonably achievable. COMPARISON: April 14, 2019 HISTORY: ORDERING SYSTEM PROVIDED HISTORY: Check for abscess/fluid collection around ashley tube site. TECHNOLOGIST PROVIDED HISTORY: Ordering Physician Provided Reason for Exam: Patient c/o abd pain around her ashley site and drainage tube. FINDINGS: Evaluation is limited by motion. Lower Chest: Moderate bilateral pleural effusions. Organs: Multiple liver hypodensities measuring up to 4 mm are incompletely characterized but in the absence of risk factors likely represent cysts requiring no specific follow-up. Cholecystostomy tube is in place. No adjacent focal drainable fluid collection. No pancreatic lesion. No splenomegaly. No adrenal lesion. No hydronephrosis. No renal lesion. GI/Bowel: Stomach is markedly distended. Swirled appearance of the mesentery is seen within the mid to lower abdomen with adjacent thickened loops of small bowel. Peritoneum/Retroperitoneum: Atherosclerotic calcification of the abdominal aorta without aneurysmal dilatation. No adenopathy. Bones/Soft Tissues: No acute soft tissue abnormality. Diffuse osteopenia. Lumbar spine degenerative changes. Bowel obstruction which is suspected to be caused by an internal hernia. Cholecystostomy tube is in place. No surrounding fluid collection.      Nm Gi Blood Loss    Result Date: 5/3/2019  EXAMINATION: NUCLEAR MEDICINE GASTRIC BLEEDING STUDY 5/3/2019 TECHNIQUE: Following the intravenous injection of 23.8 mCi of 99 mTc-labeled RBC's, a flow study and standard images of the abdomen was obtained over a total period of 60 minutes COMPARISON: None. HISTORY: ORDERING SYSTEM PROVIDED HISTORY: GI BLEEDING TECHNOLOGIST PROVIDED HISTORY: Ordering Physician Provided Reason for Exam: GI bleeding Acuity: Unknown Type of Exam: Unknown FINDINGS: Activity is seen within the blood pool, including the great vessels, heart, liver, and spleen. There was no abnormal accumulation of radioactivity in the abdomen to suggest active bleeding during study acquisition. No evidence of active GI bleeding during acquisition. RECOMMENDATIONS: If the patient shows hemodynamic signs of an active bleed in the next 20 hours, additional images can be acquired. Ct Abdomen Pelvis W Iv Contrast    Result Date: 4/11/2019  EXAMINATION: CT OF THE ABDOMEN AND PELVIS WITH CONTRAST 4/11/2019 5:14 pm TECHNIQUE: CT of the abdomen and pelvis was performed with the administration of intravenous contrast. Multiplanar reformatted images are provided for review. Dose modulation, iterative reconstruction, and/or weight based adjustment of the mA/kV was utilized to reduce the radiation dose to as low as reasonably achievable. COMPARISON: None. HISTORY: ORDERING SYSTEM PROVIDED HISTORY: Abdominal pain TECHNOLOGIST PROVIDED HISTORY: IV Only Contrast Ordering Physician Provided Reason for Exam: patient c/o abd pain for an hour FINDINGS: Lower Chest: Trace pleural fluid bilaterally is noted. Indwelling cardiac pacemaker is present. Heart size is normal.  Coronary artery calcifications are evident. The esophagus is significantly dilated and fluid-filled. No paraesophageal adenopathy is evident. Organs: The spleen, pancreas, and adrenals are unremarkable. The liver contains hypodensities, likely cysts.   The gallbladder is distended and contains a solitary gallstone. The kidneys excrete contrast bilaterally. Extrarenal collecting systems are noted. The ureters are mildly dilated down to the urinary bladder without evidence of intraluminal or ureteral obstructing calculi. GI/Bowel: Marked distention of the stomach is noted; this continues to the pylorus with appearance suggesting partial gastric outlet obstruction. Fluid is present in some small bowel loops and colon distal to the stomach. No small bowel obstruction. Pelvis: Marked distention of the urinary bladder is noted. 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 Distended ureters may be related to reflux or infection. No free pelvic fluid, pelvic or inguinal adenopathy is noted. Peritoneum/Retroperitoneum: No aortic aneurysm. Mild to moderate plaque is noted in the infrarenal abdominal aorta and both proximal iliac arteries. Shotty lymph nodes are present around the aorta in the upper abdomen. No mesenteric adenopathy is noted. Bones/Soft Tissues: Degenerative changes are present in the hips and lower lumbar facets. Estimated biologic radiation dose for this procedure:258.77 mGy/cm2.     1. Dilatation of the thoracic esophagus filled with fluid. No obstructive process is noted. No adjacent enlarged lymph nodes. 2. Trace pleural fluid. 3. Marked distention of the stomach. This appears to extend to the pylorus. Partial gastric outlet obstruction is not excluded. 4. Bilateral dilated ureters without obstructing calculi. Urinary bladder is markedly distended with bladder wall air suggesting emphysematous cystitis. Dilatation of the ureters may be related to reflux or infection. 5. Atherosclerotic disease. 6. Other findings as above. Critical results were called by Dr. Anson Cisse MD to Ilda Rueda on 4/11/2019 at 17:37. Kate Long Device Plmt/replace/removal    Result Date: 4/30/2019  PROCEDURE: ULTRASOUND GUIDED VASCULAR ACCESS. PROVIDED HISTORY: Acute respiratory failure, pleural effusion TECHNOLOGIST PROVIDED HISTORY: Acute respiratory failure, pleural effusion Ordering Physician Provided Reason for Exam: pleural effusion, respiratory failure. Acuity: Acute Type of Exam: Initial FINDINGS: Heart size is normal.  Dual-chamber pacemaker placed via left subclavian approach. Right internal jugular central line has tip in distal superior vena cava. Interval removal of nasogastric tube. No interval change of infiltrate and atelectasis at the left lung base. Stable mild infiltrate in the left upper lobe. Mild interval improvement of infiltrate at the right lung base. Stable small bilateral pleural effusions, left larger than right. Mild CHF, stable. 1.  No interval change of infiltrate and atelectasis at the left lung base. Stable mild infiltrate in the left upper lobe. 2.  Mild interval improvement of infiltrate at the right lung base. 3.  Stable small bilateral pleural effusions, left larger than right. 4.  Mild CHF, stable. Xr Chest Portable    Result Date: 4/22/2019  EXAMINATION: SINGLE XRAY VIEW OF THE CHEST 4/22/2019 6:44 am COMPARISON: April 21, 2019. HISTORY: ORDERING SYSTEM PROVIDED HISTORY: Hypoxia and CHF TECHNOLOGIST PROVIDED HISTORY: Hypoxia and CHF Ordering Physician Provided Reason for Exam: hx of hypoxia/CHF Acuity: Acute Type of Exam: Initial FINDINGS: Right IJ central venous catheter appears in unchanged position. Nasogastric tube courses below the diaphragm, with tip not imaged. Left chest wall pacemaker device projects in unchanged position. Cardiac and mediastinal contours are enlarged but unchanged. Unchanged bilateral pleural effusions and bibasilar pulmonary opacities. Unchanged findings suggestive of congestive heart failure. No evidence of pneumothorax. No new osseous abnormalities. 1. No significant change in findings suggestive of congestive heart failure.  2. Unchanged bilateral pleural effusions right basilar opacity possibly edema or atelectasis. No pneumothorax. Increased opacity left upper chest possibly focal area of atelectasis, new from prior. Advanced ETT from prior exam, now 3.1 cm from ron. Increased opacity left upper chest suspicious for atelectasis. Additional supporting devices are stable. Xr Chest Portable    Result Date: 4/17/2019  EXAMINATION: SINGLE XRAY VIEW OF THE CHEST 4/17/2019 7:15 am COMPARISON: 16 April 2019 HISTORY: ORDERING SYSTEM PROVIDED HISTORY: ETT placement TECHNOLOGIST PROVIDED HISTORY: ETT placement Ordering Physician Provided Reason for Exam: on vent Acuity: Acute Type of Exam: Initial FINDINGS: AP portable view of the chest time stamped at 613 hours demonstrates overlying cardiac monitoring electrodes. A right internal jugular catheter terminates in the superior vena cava. Endotracheal tube is somewhat high riding terminating 6.4 cm above the ron. Intestinal tube extends beyond the body of the stomach, tip not included on the exam.  Bipolar pacemaker enters from the left with intact leads in appropriate positions. Heart size is normal.  Left pleural effusion is noted there is continued volume loss in the left hemithorax with stable mild vascular congestion, perihilar opacities, and faint opacity in the right mid and lower lung field. Underlying COPD is noted. Osseous structures are stable. There is little change from prior study. Findings of COPD, mild central vascular congestion, left effusion, and scattered opacities favoring interstitial edema with multifocal pneumonitis not excluded. Tubes and lines as above. However, endotracheal tube is high riding. The findings were sent to the Radiology Results [address] at 7:58 am on 4/17/2019to be communicated to a licensed caregiver. RECOMMENDATION: Suggest advancement of endotracheal tube.      Xr Chest Portable    Result Date: 4/16/2019  EXAMINATION: SINGLE XRAY VIEW OF THE CHEST 4/16/2019 6:54 am COMPARISON: 04/15/2019, 610 hours HISTORY: ORDERING SYSTEM PROVIDED HISTORY: ETT placement TECHNOLOGIST PROVIDED HISTORY: ETT placement Ordering Physician Provided Reason for Exam: on vent Acuity: Acute Type of Exam: Initial 57-year-old female on ventilator; check endotracheal tube placement FINDINGS: Portable AP upright view of the chest. Endotracheal tube distal tip overlying the mid trachea approximately 4.1 cm above the level of the ron. Enteric tube traverses the GE junction with distal tip excluded from the field of view. Left subclavian approach cardiac pacemaker device distal lead tips relatively stable in position. Right internal jugular approach central venous catheter distal tip overlying the high right atrium, stable. Cardiac monitor leads overlie the chest. Atherosclerotic calcification of the thoracic aorta. Slight stable volume loss of the left hemithorax. No pneumothorax. No free air. Dense retrocardiac/left basilar airspace consolidation and small left-sided pleural effusion. Stable mild focal opacity at the right mid lung zone. Underlying COPD. Stable mild pulmonary vascular congestion and left-sided predominant parahilar opacity. Visualized osseous structures remain unchanged. 1. Stable multifocal airspace disease as detailed above with dense retrocardiac/left basilar airspace consolidation and small left-sided pleural effusion. Mild pulmonary vascular congestion. Findings may represent edema or multifocal pneumonia. 2. Underlying COPD. 3. Tubes and line as detailed above.      Xr Chest Portable    Result Date: 4/15/2019  EXAMINATION: SINGLE XRAY VIEW OF THE CHEST 4/15/2019 6:47 am COMPARISON: 14 April 2019 HISTORY: ORDERING SYSTEM PROVIDED HISTORY: ETT placement TECHNOLOGIST PROVIDED HISTORY: ETT placement Ordering Physician Provided Reason for Exam: on vent Acuity: Acute Type of Exam: Initial FINDINGS: AP portable view of the chest time stamped at 612 hours demonstrates overlying cardiac monitoring electrodes. Endotracheal tube terminates 4 cm above the ron. Bipolar pacemaker enters from the left with intact leads in appropriate positions. Intestinal tube extends beyond the fundus of the stomach, tip not included. Right internal jugular catheter terminates at the cavoatrial junction. Heart size is normal.  Aortic arch is calcified. There is interval improvement in vascular congestion with resolution of perihilar opacities. Some bibasilar opacities remain. No extrapleural air is noted. Osseous structures are stable. Interval improvement in vascular congestion and bilateral opacities consistent with resolving pulmonary edema. Tubes and lines as above. Xr Chest Portable    Result Date: 4/14/2019  EXAMINATION: SINGLE XRAY VIEW OF THE CHEST 4/14/2019 7:57 am COMPARISON: Portable chest 04/13/2019. HISTORY: ORDERING SYSTEM PROVIDED HISTORY: Intubation TECHNOLOGIST PROVIDED HISTORY: Intubation Ordering Physician Provided Reason for Exam: intubation Acuity: Acute Type of Exam: Initial Additional signs and symptoms: intubation FINDINGS: Endotracheal tube terminates over the midthoracic trachea. Dual-chamber pacemaker leads appear unchanged in position. Right IJ approach central venous catheter unchanged in position. Heart size not substantially changed. Perihilar and basilar opacities further increased. Left pleural effusion increased in size. Findings may reflect pulmonary edema, progressed from yesterday's exam.  Left pleural effusion increased in size. Xr Chest Portable    Result Date: 4/12/2019  EXAMINATION: SINGLE XRAY VIEW OF THE CHEST 4/12/2019 5:26 am COMPARISON: November 14, 2015. HISTORY: ORDERING SYSTEM PROVIDED HISTORY: line placement TECHNOLOGIST PROVIDED HISTORY: line placement Ordering Physician Provided Reason for Exam: New right side line placement.  Acuity: Acute Type of Exam: Initial Additional signs and symptoms: New right side line placement. FINDINGS: Stable left pectoral trans venous cardiac pacer device. New right IJ central venous catheter with tip near the superior atrial caval junction. Normal lung volume. No new consolidation. Curvilinear radiopacity projecting over the right upper lobe likely represents artifact from a skin fold. No pleural effusion or pneumothorax. Stable cardiomediastinal silhouette and great vessels with redemonstration of atherosclerotic thoracic aorta. New right IJ central venous catheter with tip near the superior atrial caval junction. No pneumothorax. No new consolidation. Vl Upper Extremity Bilateral Venous Duplex    Result Date: 4/22/2019    [facility]  Vascular Upper Extremities Veins Procedure   Patient Name   Bipin Pichardo     Date of Study           04/22/2019                 David Decker   Date of Birth  1950  Gender                  Female   Age            77 year(s)  Race                       Room Number    2002        Height:                 59.84 inch, 152 cm   Corporate ID # [id number]    Weight:                 102 pounds, 46.3 kg   Patient Acct # [de-identified]   BSA:        1.4 m^2     BMI:       20.03 kg/m^2   MR #           [id number]      Sonographer             Roderick Mario   Accession #    [id number]   Interpreting Physician  Da Andrews   Referring                  Referring Physician     Carine Juarez *  Nurse  Practitioner  Procedure Type of Study:   Veins: Upper Extremities Veins, Venous Scan Upper Bilateral.  Indications for Study:Swelling. Patient Status: In Patient. Technical Quality:Limited visualization. Limitation reason:Line placements. Conclusions   Summary   No evidence of superficial or deep venous thrombosis in both upper  extremities.    Signature   ----------------------------------------------------------------  Electronically signed by Roderick Mario(Sonographer) on  04/22/2019 11:46 AM +------------------------------------+----------+---------------+----------+ ! Prox Brachial                       !Yes       ! Yes            ! None      ! +------------------------------------+----------+---------------+----------+ ! Dist Brachial                       !Yes       ! Yes            ! None      ! +------------------------------------+----------+---------------+----------+ ! Prox Radial                         !Yes       ! Yes            ! None      ! +------------------------------------+----------+---------------+----------+ ! Dist Radial                         !Yes       ! Yes            ! None      ! +------------------------------------+----------+---------------+----------+ ! Prox Ulnar                          ! Yes       ! Yes            ! None      ! +------------------------------------+----------+---------------+----------+ ! Dist Ulnar                          ! Yes       ! Yes            ! None      ! +------------------------------------+----------+---------------+----------+ ! Basilic at UA                       ! Yes       ! Yes            ! None      ! +------------------------------------+----------+---------------+----------+ ! Basilic at AF                       ! Yes       ! Yes            ! None      ! +------------------------------------+----------+---------------+----------+ ! Basilic at [address]                       ! Yes       ! Yes            ! None      ! +------------------------------------+----------+---------------+----------+ ! Cephalic at UA                      ! Yes       ! Yes            ! None      ! +------------------------------------+----------+---------------+----------+ ! Cephalic at AF                      ! Yes       ! Yes            ! None      ! +------------------------------------+----------+---------------+----------+ ! Gideon at [address]                      ! Yes       ! Yes            ! None      ! +------------------------------------+----------+---------------+----------+ Doppler Measurements !None      ! +------------------------------------+----------+---------------+----------+ ! Dist Brachial                       !Yes       ! Yes            ! None      ! +------------------------------------+----------+---------------+----------+ ! Prox Radial                         !Yes       ! Yes            ! None      ! +------------------------------------+----------+---------------+----------+ ! Dist Radial                         !Yes       ! Yes            ! None      ! +------------------------------------+----------+---------------+----------+ ! Prox Ulnar                          ! Yes       ! Yes            ! None      ! +------------------------------------+----------+---------------+----------+ ! Dist Ulnar                          ! Yes       ! Yes            ! None      ! +------------------------------------+----------+---------------+----------+ ! Basilic at UA                       ! Yes       ! Yes            ! None      ! +------------------------------------+----------+---------------+----------+ ! Cephalic at UA                      ! Yes       ! Yes            ! None      ! +------------------------------------+----------+---------------+----------+ ! Cephalic at AF                      ! Yes       ! Yes            ! None      ! +------------------------------------+----------+---------------+----------+ Doppler Measurements +-------------------------+-----------------------+------------------------+ ! Location                 ! Signal                 !Reflux                  ! +-------------------------+-----------------------+------------------------+ ! IJV                      ! Phasic                 !                        ! +-------------------------+-----------------------+------------------------+ ! SCV                      ! Phasic                 !                        ! +-------------------------+-----------------------+------------------------+ ! Axillary                 ! Phasic                 ! ! +-------------------------+-----------------------+------------------------+ ! Brachial                 !Phasic                 !                        ! +-------------------------+-----------------------+------------------------+    Ir Cholecystostomy Percutaneous Complete    Result Date: 4/22/2019  PROCEDURE: ULTRASOUND and fluoroscopic GUIDED CHOLECYSTOSTOMY TUBE PLACEMENT April 22, 2019 HISTORY: ORDERING SYSTEM PROVIDED HISTORY: acute cholecystitis TECHNOLOGIST PROVIDED HISTORY: acute cholecystitis SEDATION: 75 mcg IV fentanyl for pain TECHNIQUE: Informed consent was obtained after a detailed explanation of the procedure including risks, benefits, and alternatives. Universal protocol was followed. A suitable skin site was prepped and draped in sterile fashion following ultrasound localization. An 18 gauge needle was advanced under ultrasound guidance into the gallbladder via transhepatic route and a 0.035 guidewire was used to place a 8 [place] cholecystostomy tube under fluoroscopic guidance. The catheter was sutured to the skin and the patient tolerated the procedure well. Thick bilious material was sent for laboratory analysis. The catheter was attached to gravity drainage. FINDINGS: Ultrasound image demonstrates distended gallbladder. Bilious fluid was sent for culture. Successful placement of transhepatic 8 [place] percutaneous cholecystostomy tube. Nm Hepatobiliary Scan W Ejection Fraction    Result Date: 4/20/2019  EXAMINATION: NUCLEAR MEDICINE HEPATOBILIARY SCINTIGRAPHY (HIDA SCAN). 4/20/2019 2:15 pm TECHNIQUE: Approximately 5.6 [id number] Mebrofenin (Choletec) was administered IV. Then, dynamic images of the abdomen were obtained in the anterior projection for 60 mins. A right lateral view was also obtained at 60 mins. Delayed images up to 4 hours were obtained.  COMPARISON: Ultrasound 04/19/2019 HISTORY: ORDERING SYSTEM PROVIDED HISTORY: CHOLECYSTITIS TECHNOLOGIST PROVIDED HISTORY: Ordering Physician Provided Reason for Exam: Cholecystitis Acuity: Unknown Type of Exam: Unknown FINDINGS: Prompt, homogenous uptake by the liver is noted with normal appearance of radiotracer excretion into the biliary system. Clearance of bloodpool activity appears appropriate. Normal small bowel activity is seen. Prominent activity within the common bile duct. The gallbladder is not visualized by the end of the exam.     Absent filling of the gallbladder consistent with acute cholecystitis. Physical Examination:        Physical Exam   Constitutional: She is oriented to person, place, and time. No distress. In no distress, states she is fatigued   HENT:   Mouth/Throat: Oropharynx is clear and moist.   Cardiovascular: Normal rate, regular rhythm and normal heart sounds. No murmur heard. Pulmonary/Chest: Effort normal and breath sounds normal.   Abdominal: Soft. Bowel sounds are normal. She exhibits no distension and no mass. There is tenderness (mild, in all quadrants, worst in RUQ). Musculoskeletal: She exhibits no edema or tenderness. Neurological: She is alert and oriented to person, place, and time. Skin: Skin is warm and dry. She is not diaphoretic. Nursing note and vitals reviewed.         Assessment:        Primary Problem  GI bleed    Active Hospital Problems    Diagnosis Date Noted    Small bowel obstruction ([facility] [address] [K56.609]     Anxiety disorder [F41.9]     Noncompliance [Z91.19]     Anemia [D64.9]     GI bleed [K92.2] 05/03/2019    Rectal bleeding [K62.5]     Centrilobular emphysema ([facility] [address] [J43.2] 04/30/2019    CRP elevated [R79.82]     Elevated procalcitonin [R79.89]     Bandemia [D72.825]     Cholecystitis [K81.9]     Abdominal distention [R14.0]     Elevated CEA [R97.0]     Elevated CA 19-9 level [R97.8]     Urinary retention [R33.9]     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]     Septic shock (HCC) [A41.9, R65.21]     Leukemoid reaction [D72.823]     Aspiration pneumonia of right lower lobe due to vomit ([facility] [address] [J69.0]     MRSA carrier [Z22.322]     Sepsis due to urinary tract infection ([facility] [address] [A41.9, N39.0] 04/11/2019    Cystitis [N30.90] 04/11/2019       Plan:        GI Bleed  - Patient refusing all intervention   - Ethics consulted   - Bright red stool still present   - Lovenox and plavix held  - Hgb 7.2  - Transfuse Hgb <7.0  - GI consulted - appreciate recs     Psych consult  - Per psych consult, unable to completley evaluate, get mmse, rest of note regarding capacity vs competency   - Patient refusing all medical treatment   - MMSE score 22     Abdominal pain  - Ileus, patient refusing all intervention    - GI following      Sepsis secondary to e.coli, emphysematous cystitis - resolved   - Urology consulted - appreciate recs - dyer dced (4/30)  - Gen surg consulted - appreciate recs - Signed off, call as needed   - ID consulted - appreciate recs - monitor closely   - Cholecystotomy tube 4/22   - Lasix continued - monitor urine output       Sinus Tachycardia, improved   - Stable  - Hx of CBA, bradycardia with pacemaker   - Lopressor 12.5 BID   - Cont. to monitor      Acute cholecystitis   - General surgery consulted - appreciate recs   - Surgical intervention: cholecystomy tube 4/22    - Cholecystectomy in patient when medically stable      Gastroparesis   - Protonix  - Reglan  - Daily EKG  - Considering systemic autonomic dysfunction as overlying diagnosis (gastroparesis, - neurogenic bladder, hypotension/fluctuating BPs)      Anemia, most likely 2/2 bleeding   - Hgb 7.2 s/p 4u PRBC      Thrombocytopenia - improved  - Platelets 343  - hold lovenox  - Cont.  To monitor      Hypokalemia - improved   - Potassium sliding scale   - 3.8     Hypomagnesemia - improved   - Mg replacement   - 1.6     Hypophosphatemia - improved   - Sodium phosphate prn  - 3.1      Left knee remote distal tibia fx w/ osteopenia - stable   - Cast was removed last weekend      Maintenance  - Lovenox held   - Dulera, Xopenex, Symbicort, Atrovent  - Continue home meds trazodone, ASA, Plavix, Norvasc, Effexor, Spiriva     Dispo  - PT/OT eval and treat   - Social work [employer] planning     Patient refusing all surgical intervention. Discussed in length risks of no medical management. Patient voiced understanding. Plan to be discussed with attending Dr. Gabo Gonzalez MD  5/8/2019  7:17 AM       IM Attending    Pt seen and examined today   I have discussed the care of pt , including pertinent history and exam findings,  with the resident. I have reviewed the key elements of all parts of the encounter with the resident. I agree with the assessment, plan and orders as documented by the resident.     Electronically signed by Prasanth Phillips MD on 5/8/2019 at 5:38 PM

## 2019-05-08 NOTE — PLAN OF CARE
Problem: Falls - Risk of:  Goal: Will remain free from falls  Description  Will remain free from falls  5/8/2019 0402 by Magalie Cormier RN  Outcome: Met This Shift     Problem: Infection, Septic Shock:  Goal: Will show no infection signs and symptoms  Description  Will show no infection signs and symptoms  5/8/2019 0402 by Magalie Cormier RN  Outcome: Met This Shift     Problem: Tissue Perfusion, Altered:  Goal: Circulatory function within specified parameters  Description  Circulatory function within specified parameters  5/8/2019 0402 by Magalie Cormier RN  Outcome: Met This Shift     Problem: Risk for Impaired Skin Integrity  Goal: Tissue integrity - skin and mucous membranes  Description  Structural intactness and normal physiological function of skin and  mucous membranes. 5/8/2019 0402 by Magalie Cormier RN  Outcome: Ongoing     Patient has multiple documented abrasions on coccyx. Problem: Pain:  Goal: Pain level will decrease  Description  Pain level will decrease  5/8/2019 0402 by Magalie Cormier RN  Outcome: Not Met This Shift     Patient yells in pain multiple times per shift even with regular fentanyl administrations.

## 2019-05-08 NOTE — PROGRESS NOTES
NO obvious signs of active bleeding noted today. She had a 2 BMs that were both loose and brown. No blood noted.

## 2019-05-09 ENCOUNTER — APPOINTMENT (OUTPATIENT)
Dept: INTERVENTIONAL RADIOLOGY/VASCULAR | Age: 69
DRG: 853 | End: 2019-05-09
Payer: COMMERCIAL

## 2019-05-09 ENCOUNTER — APPOINTMENT (OUTPATIENT)
Dept: GENERAL RADIOLOGY | Age: 69
DRG: 853 | End: 2019-05-09
Payer: COMMERCIAL

## 2019-05-09 LAB
ALBUMIN SERPL-MCNC: 2 G/DL (ref 3.5–5.2)
ALBUMIN/GLOBULIN RATIO: ABNORMAL (ref 1–2.5)
ALP BLD-CCNC: 174 U/L (ref 35–104)
ALT SERPL-CCNC: 13 U/L (ref 5–33)
ANION GAP SERPL CALCULATED.3IONS-SCNC: 10 MMOL/L (ref 9–17)
AST SERPL-CCNC: 16 U/L
BILIRUB SERPL-MCNC: 0.64 MG/DL (ref 0.3–1.2)
BUN BLDV-MCNC: 23 MG/DL (ref 8–23)
BUN/CREAT BLD: ABNORMAL (ref 9–20)
C-REACTIVE PROTEIN: 110.1 MG/L (ref 0–5)
CALCIUM SERPL-MCNC: 8 MG/DL (ref 8.6–10.4)
CHLORIDE BLD-SCNC: 111 MMOL/L (ref 98–107)
CO2: 23 MMOL/L (ref 20–31)
CREAT SERPL-MCNC: <0.4 MG/DL (ref 0.5–0.9)
GFR AFRICAN AMERICAN: ABNORMAL ML/MIN
GFR NON-AFRICAN AMERICAN: ABNORMAL ML/MIN
GFR SERPL CREATININE-BSD FRML MDRD: ABNORMAL ML/MIN/{1.73_M2}
GFR SERPL CREATININE-BSD FRML MDRD: ABNORMAL ML/MIN/{1.73_M2}
GLUCOSE BLD-MCNC: 103 MG/DL (ref 65–105)
GLUCOSE BLD-MCNC: 118 MG/DL (ref 65–105)
GLUCOSE BLD-MCNC: 119 MG/DL (ref 65–105)
GLUCOSE BLD-MCNC: 120 MG/DL (ref 70–99)
GLUCOSE BLD-MCNC: 128 MG/DL (ref 65–105)
GLUCOSE BLD-MCNC: 129 MG/DL (ref 65–105)
HCT VFR BLD CALC: 31.8 % (ref 36–46)
HCT VFR BLD CALC: 36.3 % (ref 36–46)
HCT VFR BLD CALC: 37.1 % (ref 36–46)
HEMOGLOBIN: 10.3 G/DL (ref 12–16)
HEMOGLOBIN: 11.7 G/DL (ref 12–16)
HEMOGLOBIN: 11.7 G/DL (ref 12–16)
INR BLD: 1.3
POTASSIUM SERPL-SCNC: 4.2 MMOL/L (ref 3.7–5.3)
PREALBUMIN: 17.6 MG/DL (ref 20–40)
PROCALCITONIN: 0.07 NG/ML
PROTHROMBIN TIME: 16.7 SEC (ref 11.8–14.6)
SEDIMENTATION RATE, ERYTHROCYTE: 72 MM (ref 0–20)
SODIUM BLD-SCNC: 144 MMOL/L (ref 135–144)
TOTAL PROTEIN: 5.3 G/DL (ref 6.4–8.3)

## 2019-05-09 PROCEDURE — APPNB30 APP NON BILLABLE TIME 0-30 MINS: Performed by: NURSE PRACTITIONER

## 2019-05-09 PROCEDURE — 2709999900 IR PLACE NG TUBE BY DR W FLUORO

## 2019-05-09 PROCEDURE — 2500000003 HC RX 250 WO HCPCS: Performed by: INTERNAL MEDICINE

## 2019-05-09 PROCEDURE — 94761 N-INVAS EAR/PLS OXIMETRY MLT: CPT

## 2019-05-09 PROCEDURE — 6360000002 HC RX W HCPCS: Performed by: INTERNAL MEDICINE

## 2019-05-09 PROCEDURE — 86140 C-REACTIVE PROTEIN: CPT

## 2019-05-09 PROCEDURE — 99231 SBSQ HOSP IP/OBS SF/LOW 25: CPT | Performed by: INTERNAL MEDICINE

## 2019-05-09 PROCEDURE — 85014 HEMATOCRIT: CPT

## 2019-05-09 PROCEDURE — 85651 RBC SED RATE NONAUTOMATED: CPT

## 2019-05-09 PROCEDURE — 99232 SBSQ HOSP IP/OBS MODERATE 35: CPT | Performed by: INTERNAL MEDICINE

## 2019-05-09 PROCEDURE — 2580000003 HC RX 258: Performed by: INTERNAL MEDICINE

## 2019-05-09 PROCEDURE — 84145 PROCALCITONIN (PCT): CPT

## 2019-05-09 PROCEDURE — 85018 HEMOGLOBIN: CPT

## 2019-05-09 PROCEDURE — P9016 RBC LEUKOCYTES REDUCED: HCPCS

## 2019-05-09 PROCEDURE — 84134 ASSAY OF PREALBUMIN: CPT

## 2019-05-09 PROCEDURE — 80053 COMPREHEN METABOLIC PANEL: CPT

## 2019-05-09 PROCEDURE — 2060000000 HC ICU INTERMEDIATE R&B

## 2019-05-09 PROCEDURE — 94640 AIRWAY INHALATION TREATMENT: CPT

## 2019-05-09 PROCEDURE — 82947 ASSAY GLUCOSE BLOOD QUANT: CPT

## 2019-05-09 PROCEDURE — 6360000004 HC RX CONTRAST MEDICATION: Performed by: SURGERY

## 2019-05-09 PROCEDURE — 86900 BLOOD TYPING SEROLOGIC ABO: CPT

## 2019-05-09 PROCEDURE — 43752 NASAL/OROGASTRIC W/TUBE PLMT: CPT | Performed by: RADIOLOGY

## 2019-05-09 PROCEDURE — 2500000003 HC RX 250 WO HCPCS: Performed by: SURGERY

## 2019-05-09 PROCEDURE — 85610 PROTHROMBIN TIME: CPT

## 2019-05-09 PROCEDURE — 36430 TRANSFUSION BLD/BLD COMPNT: CPT

## 2019-05-09 PROCEDURE — 36415 COLL VENOUS BLD VENIPUNCTURE: CPT

## 2019-05-09 RX ORDER — LORAZEPAM 0.5 MG/1
0.5 TABLET ORAL
Status: ACTIVE | OUTPATIENT
Start: 2019-05-09 | End: 2019-05-09

## 2019-05-09 RX ADMIN — Medication 10 ML: at 08:40

## 2019-05-09 RX ADMIN — METOPROLOL TARTRATE 10 MG: 5 INJECTION INTRAVENOUS at 12:21

## 2019-05-09 RX ADMIN — LORAZEPAM 0.5 MG: 2 INJECTION INTRAMUSCULAR; INTRAVENOUS at 15:07

## 2019-05-09 RX ADMIN — MORPHINE SULFATE 2 MG: 2 INJECTION, SOLUTION INTRAMUSCULAR; INTRAVENOUS at 04:27

## 2019-05-09 RX ADMIN — LORAZEPAM 0.5 MG: 2 INJECTION INTRAMUSCULAR; INTRAVENOUS at 08:24

## 2019-05-09 RX ADMIN — CALCIUM GLUCONATE: 94 INJECTION, SOLUTION INTRAVENOUS at 18:14

## 2019-05-09 RX ADMIN — SODIUM CHLORIDE 250 ML: 0.9 INJECTION, SOLUTION INTRAVENOUS at 00:52

## 2019-05-09 RX ADMIN — DIATRIZOATE MEGLUMINE AND DIATRIZOATE SODIUM 360 ML: 600; 100 SOLUTION ORAL; RECTAL at 10:31

## 2019-05-09 RX ADMIN — LEVALBUTEROL 1.25 MG: 1.25 SOLUTION, CONCENTRATE RESPIRATORY (INHALATION) at 14:55

## 2019-05-09 RX ADMIN — METOPROLOL TARTRATE 10 MG: 5 INJECTION INTRAVENOUS at 21:42

## 2019-05-09 RX ADMIN — IPRATROPIUM BROMIDE 0.5 MG: 0.5 SOLUTION RESPIRATORY (INHALATION) at 07:11

## 2019-05-09 RX ADMIN — LEVALBUTEROL 1.25 MG: 1.25 SOLUTION, CONCENTRATE RESPIRATORY (INHALATION) at 07:11

## 2019-05-09 RX ADMIN — LORAZEPAM 0.5 MG: 2 INJECTION INTRAMUSCULAR; INTRAVENOUS at 00:27

## 2019-05-09 RX ADMIN — IPRATROPIUM BROMIDE 0.5 MG: 0.5 SOLUTION RESPIRATORY (INHALATION) at 14:55

## 2019-05-09 RX ADMIN — FUROSEMIDE 40 MG: 10 INJECTION, SOLUTION INTRAMUSCULAR; INTRAVENOUS at 08:24

## 2019-05-09 RX ADMIN — LORAZEPAM 0.5 MG: 2 INJECTION INTRAMUSCULAR; INTRAVENOUS at 21:42

## 2019-05-09 RX ADMIN — IPRATROPIUM BROMIDE 0.5 MG: 0.5 SOLUTION RESPIRATORY (INHALATION) at 19:03

## 2019-05-09 RX ADMIN — I.V. FAT EMULSION 100 ML: 20 EMULSION INTRAVENOUS at 18:15

## 2019-05-09 RX ADMIN — LEVALBUTEROL 1.25 MG: 1.25 SOLUTION, CONCENTRATE RESPIRATORY (INHALATION) at 19:03

## 2019-05-09 RX ADMIN — LORAZEPAM 0.5 MG: 2 INJECTION INTRAMUSCULAR; INTRAVENOUS at 12:32

## 2019-05-09 ASSESSMENT — ENCOUNTER SYMPTOMS
VOMITING: 0
NAUSEA: 0
CONSTIPATION: 0
WHEEZING: 0
DIARRHEA: 0
ABDOMINAL PAIN: 0
SHORTNESS OF BREATH: 0
CHOKING: 0
COUGH: 0
BLOOD IN STOOL: 0
BACK PAIN: 0

## 2019-05-09 ASSESSMENT — PAIN SCALES - GENERAL
PAINLEVEL_OUTOF10: 0
PAINLEVEL_OUTOF10: 7
PAINLEVEL_OUTOF10: 0

## 2019-05-09 NOTE — PROGRESS NOTES
[address] [facility].    PROGRESS NOTE             5/9/2019    8:00 AM    Name:   Petrona Kline  MRN:     [id number]     Acct:      [de-identified]   Room:   [facility] Day:  29  Admit Date:  4/11/2019  2:48 PM    PCP:  Barak Pathak DO  Code Status:  Limited    Subjective:     C/C:   Chief Complaint   Patient presents with    Abdominal Pain     Interval History Status: not changed. Pt seen and examined bedside, resting comfortably. Reports still tired. Received 2u PRBC last night. Thinks she may feel a little better. Still does not want to got to a care facility. Wants to go home. Is okay with getting NG tube placed today. Denies other complaints. No corrina blood in stool noted. Brief History:     The patient is a 68 y.o.  presents sepsis secondary to acute cystitis.     Review of Systems:     Review of Systems   Constitutional: Positive for fatigue. Negative for chills, diaphoresis and fever. Respiratory: Negative for cough, choking, shortness of breath and wheezing. Cardiovascular: Negative for chest pain, palpitations and leg swelling. Gastrointestinal: Negative for abdominal pain, blood in stool, constipation, diarrhea, nausea and vomiting. Genitourinary: Negative for dysuria, frequency and pelvic pain. Musculoskeletal: Negative for back pain, myalgias and neck pain. Neurological: Negative for dizziness, speech difficulty, light-headedness and headaches. Psychiatric/Behavioral: Negative for agitation, confusion and suicidal ideas. The patient is not nervous/anxious. Medications: Allergies:     Allergies   Allergen Reactions    Penicillins Shortness Of Breath and Rash    Amoxicillin        Current Meds:   Scheduled Meds:    oxymetazoline  2 spray Each Nare BID    ipratropium  0.5 mg Nebulization 4x daily    levalbuterol  1.25 mg Nebulization 4x Daily    sodium chloride  250 mL Intravenous Once    Multiplanar reformatted images are provided for review. Dose modulation, iterative reconstruction, and/or weight based adjustment of the mA/kV was utilized to reduce the radiation dose to as low as reasonably achievable. COMPARISON: 04/11/2019 HISTORY: ORDERING SYSTEM PROVIDED HISTORY: ABDOMINAL PAIN TECHNOLOGIST PROVIDED HISTORY: Water soluble contrast only please Ordering Physician Provided Reason for Exam: Abdominal pain - Vented patient. Contrast given via nurse through NG tube. Acuity: Unknown Type of Exam: Unknown Relevant Medical/Surgical History: Hx - Sepsis due to urinary tract infection. FINDINGS: Lower Chest: New moderate layering bilateral pleural effusions with bilateral lower lobe atelectasis. Organs: Limited evaluation due lack of intravenous contrast.  Cholelithiasis redemonstrated. No gallbladder wall thickening or biliary ductal dilatation. Scattered tiny hypodense lesions in the liver are too small to characterize but statistically represent benign cysts or hemangiomas and appear unchanged. The pancreas, spleen, adrenal glands, and kidneys are unremarkable. There is no hydronephrosis or urinary tract calculus. GI/Bowel: The stomach is distended. Enteric tube is in place. No contrast is seen distal to the pylorus and there is contrast reflux into the distal esophagus. There is no evidence of bowel obstruction. The appendix is not definitely visualized. No focal pericecal inflammatory changes are evident. Pelvis: The urinary bladder is decompressed by Tran catheter. No pelvic mass is seen. Peritoneum/Retroperitoneum: Small amount of free fluid in the pelvic cavity. No free air or focal fluid collection. No abnormal lymph node. Normal abdominal aortic caliber. Moderate calcific atherosclerosis. Bones/Soft Tissues: No acute osseous abnormality. Diffuse anasarca. Moderate degenerative changes in the lumbar spine.      1. Distended, contrast filled stomach with reflux of contrast into the esophagus. No enteric contrast is seen distal to the pylorus suggesting delayed gastric emptying in the setting of ileus. 2. New moderate layering bilateral pleural effusions with bilateral lower lobe atelectasis. 3. Small amount of nonspecific free fluid in the pelvic cavity. 4. Anasarca. 5. Cholelithiasis. Xr Chest (single View Frontal)    Result Date: 5/2/2019  EXAMINATION: SINGLE XRAY VIEW OF THE CHEST 5/2/2019 6:34 am COMPARISON: 29 April 2019 HISTORY: ORDERING SYSTEM PROVIDED HISTORY: COPD TECHNOLOGIST PROVIDED HISTORY: COPD Ordering Physician Provided Reason for Exam: COPD Acuity: Acute Type of Exam: Initial FINDINGS: AP portable view of the chest time stamped at 630 hours demonstrates stable cardiac size. Overlying cardiac monitoring electrodes are present. Right-sided PICC line terminates in the right atrium. Bipolar pacemaker enters from the left with intact leads in appropriate positions. There is been no significant interval change in bilateral interstitial opacities, representing interval change since 2015. This may be related to worsening interstitial disease or superimposed venous congestion. Bilateral effusions are noted with bibasilar opacities, either atelectasis or edema. Continued bilateral effusions, bibasilar opacities and bilateral interstitial opacities in the upper lobes. Findings may be related to worsening interstitial disease and edema. Airspace disease is not excluded. Xr Chest (single View Frontal)    Result Date: 4/13/2019  EXAMINATION: SINGLE XRAY VIEW OF THE CHEST 4/13/2019 7:18 am COMPARISON: April 12, 2019 HISTORY: ORDERING SYSTEM PROVIDED HISTORY: dyspnea TECHNOLOGIST PROVIDED HISTORY: dyspnea Ordering Physician Provided Reason for Exam: dyspnea Acuity: Acute Type of Exam: Subsequent/Follow-up Additional signs and symptoms: dyspnea FINDINGS: A right IJ catheter is seen with its tip terminating at the superior cavoatrial junction.   The left chest wall pacemaker and leads are stable. The cardiomediastinal silhouette is stable. There is interval increased opacity at the right lung base, may be related to atelectasis versus pneumonia. There is small atelectasis and mild pleural effusion at the left lung base. There is no pneumothorax. There is no acute osseous abnormality. Interval increased opacity at the right lung base, may be related to atelectasis versus pneumonia. Small atelectasis and mild pleural effusion at the left lung, new since the prior study. Xr Chest Standard (2 Vw)    Result Date: 4/29/2019  EXAMINATION: TWO VIEWS OF THE CHEST 4/29/2019 8:04 pm COMPARISON: 04/27/2019 HISTORY: ORDERING SYSTEM PROVIDED HISTORY: Follow-up lung infiltrate TECHNOLOGIST PROVIDED HISTORY: Follow-up lung infiltrate Ordering Physician Provided Reason for Exam: Follow-up infiltrate. Pt unable to lean forward - unable to get proper sponge behind pt for lateral. Pt unable to raise left arm at all for lateral. Best possible lateral obtained. Acuity: Unknown Type of Exam: Unknown Additional signs and symptoms: Follow-up infiltrate. Pt unable to lean forward - unable to get proper sponge behind pt for lateral. Pt unable to raise left arm at all for lateral. Best possible lateral obtained. FINDINGS: Left chest cardiac pacemaker device is in place. Right IJ central venous catheter in place with distal tip at the cavoatrial junction. Heart size is within normal limits. No vascular congestion. There are small to moderate pleural effusions. There are interstitial opacities in the upper lungs, which could be related to scarring and/or developing infiltrate, slightly improved in appearance when compared to 04/27/2019. No evidence of pneumothorax. 1.  Small to moderate bilateral pleural effusions, better visualized on lateral view.  2.  Upper lobe interstitial opacities, slightly improved when compared to the previous study, possibly related to underlying fibrotic change or residual infiltrate. Xr Abdomen (kub) (single Ap View)    Result Date: 4/19/2019  EXAMINATION: SINGLE SUPINE XRAY VIEW(S) OF THE ABDOMEN 4/19/2019 9:48 am COMPARISON: April 14, 2019 HISTORY: ORDERING SYSTEM PROVIDED HISTORY: pain TECHNOLOGIST PROVIDED HISTORY: pain Ordering Physician Provided Reason for Exam: Abdominal pain and distentsion Acuity: Acute Type of Exam: Initial Additional signs and symptoms: Abdominal pain and distentsion FINDINGS: Enteric tube with the tip within the stomach. Small left pleural effusion. Minimal right pleural effusion. Mildly dilated loops of bowel. Nonspecific bowel gas pattern with mildly dilated loops of bowel. Xr Abdomen (kub) (single Ap View)    Result Date: 4/14/2019  EXAMINATION: SINGLE SUPINE XRAY VIEW(S) OF THE ABDOMEN 4/14/2019 7:39 am COMPARISON: CT abdomen and pelvis  film from 11 April 2019 HISTORY: [address]: Abd Distention TECHNOLOGIST PROVIDED HISTORY: Abd Distention Ordering Physician Provided Reason for Exam: Abdominal distention. Pt was moving around in the bed. Best films at present time. Acuity: Acute Type of Exam: Initial Additional signs and symptoms: Abdominal distention. Pt was moving around in the bed. Best films at present time. FINDINGS: Portable view time stamped at 748 hours demonstrates an intestinal tube terminating in the midportion of a gaseous Spike dilated stomach. Densities are present over the stomach likely medication. Bipolar pacemaker is in situ with intact leads. Heart size is top-normal, stable. Gaseous distension of the stomach and loop of bowel in the upper mid abdomen is noted but there is gas and fecal material in the rectum. Gastric outlet obstruction or proximal small bowel partial obstruction is suspected. No free air is noted. Midline city is present over the pelvis likely a monitor or CT small bore catheter. Vascular calcification is present in the pelvis.      Persistent preferential gaseous distension of the stomach although there is some gas in the upper abdomen and gas and fecal material present in the rectosigmoid. Findings suggest gastric outlet obstruction. Ct Abdomen W Contrast Additional Contrast? Radiologist Recommendation    Result Date: 5/5/2019  EXAMINATION: CT ABDOMEN WITH CONTRAST, 5/5/2019 3:38 pm TECHNIQUE: CT of the abdomen was performed with the administration of intravenous contrast. Multiplanar reformatted images are provided for review. Dose modulation, iterative reconstruction, and/or weight based adjustment of the mA/kV was utilized to reduce the radiation dose to as low as reasonably achievable. COMPARISON: April 14, 2019 HISTORY: ORDERING SYSTEM PROVIDED HISTORY: Check for abscess/fluid collection around ashley tube site. TECHNOLOGIST PROVIDED HISTORY: Ordering Physician Provided Reason for Exam: Patient c/o abd pain around her ashley site and drainage tube. FINDINGS: Evaluation is limited by motion. Lower Chest: Moderate bilateral pleural effusions. Organs: Multiple liver hypodensities measuring up to 4 mm are incompletely characterized but in the absence of risk factors likely represent cysts requiring no specific follow-up. Cholecystostomy tube is in place. No adjacent focal drainable fluid collection. No pancreatic lesion. No splenomegaly. No adrenal lesion. No hydronephrosis. No renal lesion. GI/Bowel: Stomach is markedly distended. Swirled appearance of the mesentery is seen within the mid to lower abdomen with adjacent thickened loops of small bowel. Peritoneum/Retroperitoneum: Atherosclerotic calcification of the abdominal aorta without aneurysmal dilatation. No adenopathy. Bones/Soft Tissues: No acute soft tissue abnormality. Diffuse osteopenia. Lumbar spine degenerative changes. Bowel obstruction which is suspected to be caused by an internal hernia. Cholecystostomy tube is in place. No surrounding fluid collection.      Nm Gi Blood Loss    Result Date: 5/3/2019  EXAMINATION: NUCLEAR MEDICINE GASTRIC BLEEDING STUDY 5/3/2019 TECHNIQUE: Following the intravenous injection of 23.8 mCi of 99 mTc-labeled RBC's, a flow study and standard images of the abdomen was obtained over a total period of 60 minutes COMPARISON: None. HISTORY: ORDERING SYSTEM PROVIDED HISTORY: GI BLEEDING TECHNOLOGIST PROVIDED HISTORY: Ordering Physician Provided Reason for Exam: GI bleeding Acuity: Unknown Type of Exam: Unknown FINDINGS: Activity is seen within the blood pool, including the great vessels, heart, liver, and spleen. There was no abnormal accumulation of radioactivity in the abdomen to suggest active bleeding during study acquisition. No evidence of active GI bleeding during acquisition. RECOMMENDATIONS: If the patient shows hemodynamic signs of an active bleed in the next 20 hours, additional images can be acquired. Ct Abdomen Pelvis W Iv Contrast    Result Date: 4/11/2019  EXAMINATION: CT OF THE ABDOMEN AND PELVIS WITH CONTRAST 4/11/2019 5:14 pm TECHNIQUE: CT of the abdomen and pelvis was performed with the administration of intravenous contrast. Multiplanar reformatted images are provided for review. Dose modulation, iterative reconstruction, and/or weight based adjustment of the mA/kV was utilized to reduce the radiation dose to as low as reasonably achievable. COMPARISON: None. HISTORY: ORDERING SYSTEM PROVIDED HISTORY: Abdominal pain TECHNOLOGIST PROVIDED HISTORY: IV Only Contrast Ordering Physician Provided Reason for Exam: patient c/o abd pain for an hour FINDINGS: Lower Chest: Trace pleural fluid bilaterally is noted. Indwelling cardiac pacemaker is present. Heart size is normal.  Coronary artery calcifications are evident. The esophagus is significantly dilated and fluid-filled. No paraesophageal adenopathy is evident. Organs: The spleen, pancreas, and adrenals are unremarkable. The liver contains hypodensities, likely cysts.   The ULTRASOUND GUIDED VASCULAR ACCESS. FLUOROSCOPY GUIDED PICC PLACEMENT 4/30/2019. HISTORY: ORDERING SYSTEM PROVIDED HISTORY: TPN TECHNOLOGIST PROVIDED HISTORY: TPN Lumen?->Double Lumen SEDATION: None FLUOROSCOPY DOSE AND TYPE OR TIME AND EXPOSURES: 7 seconds; D  TECHNIQUE: This procedure was performed by Dr. Romulo Guallpa. Informed consent was obtained after a detailed explanation of the procedure including risks, benefits, and alternatives. Universal protocol was observed. The right arm was prepped and draped in sterile fashion using maximum sterile barrier technique. Local anesthesia was achieved with lidocaine. A micropuncture needle was used to access the right basilic vein using ultrasound guidance. An ultrasound image demonstrating patency of the vein with needle tip located within it. An image was obtained and stored in PACs. A 0.018 guidewire was used to place a peel-a-way sheath and a 5 Arabic dual-lumen PICC was advanced with fluoroscopic guidance with the tip at the cavo-atrial junction. The catheter flushed easily and there was a good blood return. The catheter was secured to the skin. The patient tolerated the procedure well and there were no immediate complications. FINDINGS: Fluoroscopic image demonstrates the tip of the catheter at the cavo-atrial junction. Successful ultrasound and fluoroscopy guided PICC placement     Us Gallbladder Ruq    Result Date: 4/19/2019  EXAMINATION: RIGHT UPPER QUADRANT ULTRASOUND 4/19/2019 10:48 am COMPARISON: CT abdomen and pelvis 4/14/2018 HISTORY: ORDERING SYSTEM PROVIDED HISTORY: ABDOMINAL PAIN FINDINGS: Pancreas is not well visualized. No liver lesion. Gallbladder wall thickening. Gallbladder sludge. Cholelithiasis. Pericholecystic fluid. Common bile duct measures at the upper limits of normal at 7 mm. Findings suggesting acute cholecystitis.      Xr Chest Portable    Result Date: 4/27/2019  EXAMINATION: SINGLE XRAY VIEW OF THE CHEST 4/27/2019 8:47 am 04/22/2019 HISTORY: ORDERING SYSTEM PROVIDED HISTORY: Acute respiratory failure, pleural effusion TECHNOLOGIST PROVIDED HISTORY: Acute respiratory failure, pleural effusion Ordering Physician Provided Reason for Exam: pleural effusion, respiratory failure. Acuity: Acute Type of Exam: Initial FINDINGS: Heart size is normal.  Dual-chamber pacemaker placed via left subclavian approach. Right internal jugular central line has tip in distal superior vena cava. Interval removal of nasogastric tube. No interval change of infiltrate and atelectasis at the left lung base. Stable mild infiltrate in the left upper lobe. Mild interval improvement of infiltrate at the right lung base. Stable small bilateral pleural effusions, left larger than right. Mild CHF, stable. 1.  No interval change of infiltrate and atelectasis at the left lung base. Stable mild infiltrate in the left upper lobe. 2.  Mild interval improvement of infiltrate at the right lung base. 3.  Stable small bilateral pleural effusions, left larger than right. 4.  Mild CHF, stable. Xr Chest Portable    Result Date: 4/22/2019  EXAMINATION: SINGLE XRAY VIEW OF THE CHEST 4/22/2019 6:44 am COMPARISON: April 21, 2019. HISTORY: ORDERING SYSTEM PROVIDED HISTORY: Hypoxia and CHF TECHNOLOGIST PROVIDED HISTORY: Hypoxia and CHF Ordering Physician Provided Reason for Exam: hx of hypoxia/CHF Acuity: Acute Type of Exam: Initial FINDINGS: Right IJ central venous catheter appears in unchanged position. Nasogastric tube courses below the diaphragm, with tip not imaged. Left chest wall pacemaker device projects in unchanged position. Cardiac and mediastinal contours are enlarged but unchanged. Unchanged bilateral pleural effusions and bibasilar pulmonary opacities. Unchanged findings suggestive of congestive heart failure. No evidence of pneumothorax. No new osseous abnormalities. 1. No significant change in findings suggestive of congestive heart failure.  2. Unchanged bilateral pleural effusions and bibasilar pulmonary consolidations. RECOMMENDATION: Radiographic follow-up to complete resolution. Xr Chest Portable    Result Date: 4/21/2019  EXAMINATION: SINGLE XRAY VIEW OF THE CHEST 4/21/2019 3:08 pm COMPARISON: April 19, 2019 HISTORY: ORDERING SYSTEM PROVIDED HISTORY: hypoxia TECHNOLOGIST PROVIDED HISTORY: hypoxia Ordering Physician Provided Reason for Exam: hypoxia Acuity: Unknown Type of Exam: Unknown FINDINGS: Enteric tube in the stomach. ET tube is been removed. Right IJ catheter terminates in the atrial caval junction. Bipolar pacer on the left unchanged. Heart and mediastinum unremarkable. Moderate edema unchanged. Small effusions, left greater than right. Bony thorax intact. Status post extubation. Life support apparatus otherwise stable. Moderate edema and effusions unchanged. Xr Chest Portable    Result Date: 4/19/2019  EXAMINATION: SINGLE XRAY VIEW OF THE CHEST 4/19/2019 5:51 am COMPARISON: April 18, 2019 HISTORY: ORDERING SYSTEM PROVIDED HISTORY: ETT placement TECHNOLOGIST PROVIDED HISTORY: ETT placement Ordering Physician Provided Reason for Exam: Vent Pt check ETT placement Acuity: Acute Type of Exam: Subsequent/Follow-up Additional signs and symptoms: Vent Pt check ETT placement FINDINGS: Tubes and lines are unchanged. Persistent bibasilar lung opacities and pleural effusions. Mild pulmonary edema. No significant interval change. Xr Chest Portable    Result Date: 4/18/2019  EXAMINATION: SINGLE XRAY VIEW OF THE CHEST 4/18/2019 6:21 am COMPARISON: April 17, 2019 HISTORY: ORDERING SYSTEM PROVIDED HISTORY: ETT placement TECHNOLOGIST PROVIDED HISTORY: ETT placement Ordering Physician Provided Reason for Exam: on vent Acuity: Acute Type of Exam: Initial FINDINGS: Right IJ line and NG tube are stable. Interval advancement of ET tube terminating 3.1 cm from ron. Implanted cardiac device is present.  Left-sided effusion and associated airspace disease is stable. Hazy right basilar opacity possibly edema or atelectasis. No pneumothorax. Increased opacity left upper chest possibly focal area of atelectasis, new from prior. Advanced ETT from prior exam, now 3.1 cm from ron. Increased opacity left upper chest suspicious for atelectasis. Additional supporting devices are stable. Xr Chest Portable    Result Date: 4/17/2019  EXAMINATION: SINGLE XRAY VIEW OF THE CHEST 4/17/2019 7:15 am COMPARISON: 16 April 2019 HISTORY: ORDERING SYSTEM PROVIDED HISTORY: ETT placement TECHNOLOGIST PROVIDED HISTORY: ETT placement Ordering Physician Provided Reason for Exam: on vent Acuity: Acute Type of Exam: Initial FINDINGS: AP portable view of the chest time stamped at 613 hours demonstrates overlying cardiac monitoring electrodes. A right internal jugular catheter terminates in the superior vena cava. Endotracheal tube is somewhat high riding terminating 6.4 cm above the ron. Intestinal tube extends beyond the body of the stomach, tip not included on the exam.  Bipolar pacemaker enters from the left with intact leads in appropriate positions. Heart size is normal.  Left pleural effusion is noted there is continued volume loss in the left hemithorax with stable mild vascular congestion, perihilar opacities, and faint opacity in the right mid and lower lung field. Underlying COPD is noted. Osseous structures are stable. There is little change from prior study. Findings of COPD, mild central vascular congestion, left effusion, and scattered opacities favoring interstitial edema with multifocal pneumonitis not excluded. Tubes and lines as above. However, endotracheal tube is high riding. The findings were sent to the Radiology Results [address] at 7:58 am on 4/17/2019to be communicated to a licensed caregiver. RECOMMENDATION: Suggest advancement of endotracheal tube.      Xr Chest Portable    Result Date: 4/16/2019  EXAMINATION: SINGLE XRAY VIEW OF THE CHEST 4/16/2019 6:54 am COMPARISON: 04/15/2019, 610 hours HISTORY: ORDERING SYSTEM PROVIDED HISTORY: ETT placement TECHNOLOGIST PROVIDED HISTORY: ETT placement Ordering Physician Provided Reason for Exam: on vent Acuity: Acute Type of Exam: Initial 66-year-old female on ventilator; check endotracheal tube placement FINDINGS: Portable AP upright view of the chest. Endotracheal tube distal tip overlying the mid trachea approximately 4.1 cm above the level of the ron. Enteric tube traverses the GE junction with distal tip excluded from the field of view. Left subclavian approach cardiac pacemaker device distal lead tips relatively stable in position. Right internal jugular approach central venous catheter distal tip overlying the high right atrium, stable. Cardiac monitor leads overlie the chest. Atherosclerotic calcification of the thoracic aorta. Slight stable volume loss of the left hemithorax. No pneumothorax. No free air. Dense retrocardiac/left basilar airspace consolidation and small left-sided pleural effusion. Stable mild focal opacity at the right mid lung zone. Underlying COPD. Stable mild pulmonary vascular congestion and left-sided predominant parahilar opacity. Visualized osseous structures remain unchanged. 1. Stable multifocal airspace disease as detailed above with dense retrocardiac/left basilar airspace consolidation and small left-sided pleural effusion. Mild pulmonary vascular congestion. Findings may represent edema or multifocal pneumonia. 2. Underlying COPD. 3. Tubes and line as detailed above.      Xr Chest Portable    Result Date: 4/15/2019  EXAMINATION: SINGLE XRAY VIEW OF THE CHEST 4/15/2019 6:47 am COMPARISON: 14 April 2019 HISTORY: ORDERING SYSTEM PROVIDED HISTORY: ETT placement TECHNOLOGIST PROVIDED HISTORY: ETT placement Ordering Physician Provided Reason for Exam: on vent Acuity: Acute Type of Exam: Initial FINDINGS: AP portable view of the chest time stamped at 612 hours demonstrates overlying cardiac monitoring electrodes. Endotracheal tube terminates 4 cm above the ron. Bipolar pacemaker enters from the left with intact leads in appropriate positions. Intestinal tube extends beyond the fundus of the stomach, tip not included. Right internal jugular catheter terminates at the cavoatrial junction. Heart size is normal.  Aortic arch is calcified. There is interval improvement in vascular congestion with resolution of perihilar opacities. Some bibasilar opacities remain. No extrapleural air is noted. Osseous structures are stable. Interval improvement in vascular congestion and bilateral opacities consistent with resolving pulmonary edema. Tubes and lines as above. Xr Chest Portable    Result Date: 4/14/2019  EXAMINATION: SINGLE XRAY VIEW OF THE CHEST 4/14/2019 7:57 am COMPARISON: Portable chest 04/13/2019. HISTORY: ORDERING SYSTEM PROVIDED HISTORY: Intubation TECHNOLOGIST PROVIDED HISTORY: Intubation Ordering Physician Provided Reason for Exam: intubation Acuity: Acute Type of Exam: Initial Additional signs and symptoms: intubation FINDINGS: Endotracheal tube terminates over the midthoracic trachea. Dual-chamber pacemaker leads appear unchanged in position. Right IJ approach central venous catheter unchanged in position. Heart size not substantially changed. Perihilar and basilar opacities further increased. Left pleural effusion increased in size. Findings may reflect pulmonary edema, progressed from yesterday's exam.  Left pleural effusion increased in size. Xr Chest Portable    Result Date: 4/12/2019  EXAMINATION: SINGLE XRAY VIEW OF THE CHEST 4/12/2019 5:26 am COMPARISON: November 14, 2015. HISTORY: ORDERING SYSTEM PROVIDED HISTORY: line placement TECHNOLOGIST PROVIDED HISTORY: line placement Ordering Physician Provided Reason for Exam: New right side line placement.  Acuity: Acute Type of Exam: Initial Additional signs and symptoms: New right side line placement. FINDINGS: Stable left pectoral trans venous cardiac pacer device. New right IJ central venous catheter with tip near the superior atrial caval junction. Normal lung volume. No new consolidation. Curvilinear radiopacity projecting over the right upper lobe likely represents artifact from a skin fold. No pleural effusion or pneumothorax. Stable cardiomediastinal silhouette and great vessels with redemonstration of atherosclerotic thoracic aorta. New right IJ central venous catheter with tip near the superior atrial caval junction. No pneumothorax. No new consolidation. Vl Upper Extremity Bilateral Venous Duplex    Result Date: 4/22/2019    [facility] [employer] [facility]  Vascular Upper Extremities Veins Procedure   Patient Name   Sharee Rojo     Date of Study           04/22/2019                 Gabriella Hobson   Date of Birth  1950  Gender                  Female   Age            77 year(s)  Race                       Room Number    2002        Height:                 59.84 inch, 152 cm   Corporate ID # [id number]    Weight:                 102 pounds, 46.3 kg   Patient Acct # [de-identified]   BSA:        1.4 m^2     BMI:       20.03 kg/m^2   MR #           [id number]      Sonographer             Roderick Mario   Accession #    [id number]   Interpreting Physician  Katherine Hester   Referring                  Referring Physician     Tasha Kuhn *  Nurse  Practitioner  Procedure Type of Study:   Veins: Upper Extremities Veins, Venous Scan Upper Bilateral.  Indications for Study:Swelling. Patient Status: In Patient. Technical Quality:Limited visualization. Limitation reason:Line placements. Conclusions   Summary   No evidence of superficial or deep venous thrombosis in both upper  extremities.    Signature   ----------------------------------------------------------------  Electronically signed by Roderick Mario(Sonographer) on  04/22/2019 11:46 AM ----------------------------------------------------------------   ----------------------------------------------------------------  Electronically signed by Nikos SilvaInterpreting  physician) on 04/22/2019 09:31 PM  ----------------------------------------------------------------  Findings:   Right Impression:                  Left Impression:  Internal jugular vein not          The internal jugular, subclavian,  visualized due to line placement. axillary, brachial, radial, and ulnar                                     veins demonstrate normal  Subclavian, axillary, brachial,    compressibility with phasic Doppler  radial, and ulnar veins            signals. demonstrate normal compressibility  with phasic Doppler signals. Normal compressibility of the cephalic                                     vein. Normal compressibility of the  cephalic vein. Normal compressibility of the basilic                                     vein. Normal compressibility of the  basilic vein. Velocities are measured in cm/s ; Diameters are measured in cm Right UE Vein Measurements 2D Measurements +------------------------------------+----------+---------------+----------+ ! Location                            ! Visualized! Compressibility! Thrombosis! +------------------------------------+----------+---------------+----------+ ! Prox SCV                            ! Yes       ! Yes            ! None      ! +------------------------------------+----------+---------------+----------+ ! Dist SCV                            ! Yes       ! Yes            ! None      ! +------------------------------------+----------+---------------+----------+ ! Prox Axillary                       ! Yes       ! Yes            ! None      ! +------------------------------------+----------+---------------+----------+ ! Dist Axillary                       ! Yes       ! Yes            ! None      ! +------------------------------------+----------+---------------+----------+ ! Prox Brachial                       !Yes       ! Yes            ! None      ! +------------------------------------+----------+---------------+----------+ ! Dist Brachial                       !Yes       ! Yes            ! None      ! +------------------------------------+----------+---------------+----------+ ! Prox Radial                         !Yes       ! Yes            ! None      ! +------------------------------------+----------+---------------+----------+ ! Dist Radial                         !Yes       ! Yes            ! None      ! +------------------------------------+----------+---------------+----------+ ! Prox Ulnar                          ! Yes       ! Yes            ! None      ! +------------------------------------+----------+---------------+----------+ ! Dist Ulnar                          ! Yes       ! Yes            ! None      ! +------------------------------------+----------+---------------+----------+ ! Basilic at UA                       ! Yes       ! Yes            ! None      ! +------------------------------------+----------+---------------+----------+ ! Basilic at AF                       ! Yes       ! Yes            ! None      ! +------------------------------------+----------+---------------+----------+ ! Basilic at [address]                       ! Yes       ! Yes            ! None      ! +------------------------------------+----------+---------------+----------+ ! Cephalic at UA                      ! Yes       ! Yes            ! None      ! +------------------------------------+----------+---------------+----------+ ! Cephalic at AF                      ! Yes       ! Yes            ! None      ! +------------------------------------+----------+---------------+----------+ ! Gideon at [address]                      ! Yes       ! Yes            ! None      ! +------------------------------------+----------+---------------+----------+ Doppler Measurements +-------------------------+-----------------------+------------------------+ ! Location                 ! Signal                 !Reflux                  ! +-------------------------+-----------------------+------------------------+ ! SCV                      ! Phasic                 ! No                      ! +-------------------------+-----------------------+------------------------+ ! Axillary                 ! Phasic                 ! No                      ! +-------------------------+-----------------------+------------------------+ ! Brachial                 !Phasic                 ! No                      ! +-------------------------+-----------------------+------------------------+ Left UE Vein Measurements 2D Measurements +------------------------------------+----------+---------------+----------+ ! Location                            ! Visualized! Compressibility! Thrombosis! +------------------------------------+----------+---------------+----------+ ! Prox IJV                            ! Yes       ! Yes            ! None      ! +------------------------------------+----------+---------------+----------+ ! Dist IJV                            ! Yes       ! Yes            ! None      ! +------------------------------------+----------+---------------+----------+ ! Prox SCV                            ! Yes       ! Yes            ! None      ! +------------------------------------+----------+---------------+----------+ ! Dist SCV                            ! Yes       ! Yes            ! None      ! +------------------------------------+----------+---------------+----------+ ! Prox Axillary                       ! Yes       ! Yes            ! None      ! +------------------------------------+----------+---------------+----------+ ! Dist Axillary                       ! Yes       ! Yes            ! None      ! +------------------------------------+----------+---------------+----------+ ! Prox Brachial                       !Yes       ! Yes ! +-------------------------+-----------------------+------------------------+ ! Brachial                 !Phasic                 !                        ! +-------------------------+-----------------------+------------------------+    Ir Cholecystostomy Percutaneous Complete    Result Date: 4/22/2019  PROCEDURE: ULTRASOUND and fluoroscopic GUIDED CHOLECYSTOSTOMY TUBE PLACEMENT April 22, 2019 HISTORY: ORDERING SYSTEM PROVIDED HISTORY: acute cholecystitis TECHNOLOGIST PROVIDED HISTORY: acute cholecystitis SEDATION: 75 mcg IV fentanyl for pain TECHNIQUE: Informed consent was obtained after a detailed explanation of the procedure including risks, benefits, and alternatives. Universal protocol was followed. A suitable skin site was prepped and draped in sterile fashion following ultrasound localization. An 18 gauge needle was advanced under ultrasound guidance into the gallbladder via transhepatic route and a 0.035 guidewire was used to place a 8 [place] cholecystostomy tube under fluoroscopic guidance. The catheter was sutured to the skin and the patient tolerated the procedure well. Thick bilious material was sent for laboratory analysis. The catheter was attached to gravity drainage. FINDINGS: Ultrasound image demonstrates distended gallbladder. Bilious fluid was sent for culture. Successful placement of transhepatic 8 [place] percutaneous cholecystostomy tube. Nm Hepatobiliary Scan W Ejection Fraction    Result Date: 4/20/2019  EXAMINATION: NUCLEAR MEDICINE HEPATOBILIARY SCINTIGRAPHY (HIDA SCAN). 4/20/2019 2:15 pm TECHNIQUE: Approximately 5.6 [id number] Mebrofenin (Choletec) was administered IV. Then, dynamic images of the abdomen were obtained in the anterior projection for 60 mins. A right lateral view was also obtained at 60 mins. Delayed images up to 4 hours were obtained.  COMPARISON: Ultrasound 04/19/2019 HISTORY: ORDERING SYSTEM PROVIDED HISTORY: CHOLECYSTITIS TECHNOLOGIST PROVIDED HISTORY: Ordering Physician Provided Reason for Exam: Cholecystitis Acuity: Unknown Type of Exam: Unknown FINDINGS: Prompt, homogenous uptake by the liver is noted with normal appearance of radiotracer excretion into the biliary system. Clearance of bloodpool activity appears appropriate. Normal small bowel activity is seen. Prominent activity within the common bile duct. The gallbladder is not visualized by the end of the exam.     Absent filling of the gallbladder consistent with acute cholecystitis. Physical Examination:        Physical Exam   Constitutional: She is oriented to person, place, and time. No distress. In no distress, states she is fatigued   HENT:   Mouth/Throat: Oropharynx is clear and moist.   Cardiovascular: Normal rate, regular rhythm and normal heart sounds. No murmur heard. Pulmonary/Chest: Effort normal and breath sounds normal.   Abdominal: Soft. Bowel sounds are normal. She exhibits no distension and no mass. There is tenderness (mild, in all quadrants, worst in RUQ). Musculoskeletal: She exhibits no edema or tenderness. Neurological: She is alert and oriented to person, place, and time. Skin: Skin is warm and dry. She is not diaphoretic. Nursing note and vitals reviewed.     Assessment:        Primary Problem  GI bleed    Active Hospital Problems    Diagnosis Date Noted    Small bowel obstruction ([facility] [address] [K56.609]     Anxiety disorder [F41.9]     Noncompliance [Z91.19]     Anemia [D64.9]     GI bleed [K92.2] 05/03/2019    Rectal bleeding [K62.5]     Centrilobular emphysema ([facility] [address] [J43.2] 04/30/2019    CRP elevated [R79.82]     Elevated procalcitonin [R79.89]     Bandemia [D72.825]     Cholecystitis [K81.9]     Abdominal distention [R14.0]     Elevated CEA [R97.0]     Elevated CA 19-9 level [R97.8]     Urinary retention [R33.9]     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]     Septic shock (HCC) [A41.9, R65.21]     Leukemoid reaction [D72.823]     Aspiration pneumonia of right lower lobe due to vomit ([facility] [address] [J69.0]     MRSA carrier [Z22.322]     Sepsis due to urinary tract infection ([facility] [address] [A41.9, N39.0] 04/11/2019    Cystitis [N30.90] 04/11/2019       Plan:        Abdominal pain  - Ileus, patient refusing all intervention    - IR to try and place NG today  - Surg will try SB series post NG    GI Bleed  - Patient refusing all intervention, wants to go home    - Ethics was consulted, not seen pt yet   - No bright red stool 2d  - Lovenox and plavix held  - Transfuse Hgb <7.0  - GI consulted - appreciate recs     Psych consult  - Per psych consult, unable to completley evaluate, get mmse, rest of note regarding capacity vs competency   - Patient refusing all medical treatment   - MMSE score 22     Sepsis secondary to e.coli, emphysematous cystitis - resolved   - Urology consulted - appreciate recs - dyer dced (4/30)  - Gen surg consulted - appreciate recs - Signed off, call as needed   - ID consulted - appreciate recs - monitor closely   - Cholecystotomy tube 4/22   - Lasix continued - monitor urine output       Sinus Tachycardia, improved   - Stable  - Hx of CBA, bradycardia with pacemaker   - Lopressor 12.5 BID   - Cont.  to monitor      Acute cholecystitis   - General surgery consulted - appreciate recs   - Surgical intervention: cholecystomy tube 4/22    - Cholecystectomy in patient when medically stable      Gastroparesis   - Protonix  - Reglan  - Daily EKG  - Considering systemic autonomic dysfunction as overlying diagnosis (gastroparesis, - neurogenic bladder, hypotension/fluctuating BPs)      Anemia, believed likely 2/2 bleeding   - Hgb 10.3 s/p 6u PRBC total  - received 2u last night   - GI consulted     Maintenance  - Lovenox held due to thrombocytopenia/ possible bleeding   - Dulera, Xopenex, Symbicort, Atrovent  - Continue home meds trazodone, ASA, Plavix, Norvasc, Effexor, Spiriva     Dispo  - PT/OT eval and treat   - Social work [employer] planning     Patient refusing all surgical intervention. Discussed in length risks of no medical management. Patient voiced understanding. Trying for appeal of LTAC, social work on    Plan to be discussed with attending Dr. Gabo Gonzalez MD  5/9/2019  8:00 AM       IM Attending    Pt seen and examined today   I have discussed the care of pt , including pertinent history and exam findings,  with the resident. I have reviewed the key elements of all parts of the encounter with the resident. I agree with the assessment, plan and orders as documented by the resident.     Electronically signed by Prasanth Phillips MD

## 2019-05-09 NOTE — PROGRESS NOTES
[facility]    Gastroenterology Daily Progress Note      Patient:   Elo Worley   :    1950   [facility]:   [facility]  Date:     2019  Consultant:   Yahaira Velásquez, JOHN      SUBJECTIVE  77 y.o. female admitted 2019 with Sepsis due to urinary tract infection ([facility] [address] [A41.9, N39.0]  Cystitis [N30.90]  Cystitis [N30.90] and seen for bright red blood per rectum and SBO. The pt was seen and examined. She required 2 units of blood yesterday for a hgb of 6.2; her hgb is now improved to 10.3. She did not have overt bleeding and has had several non bloody bowel movements. She initially agreed to have a NG tube placed yesterday for the SBO but this could not be accomplished because she said it was too painful. She is going for IR NG tube placement today. She continues to have right sided abdominal pain without emesis. Her cholecystostomy tube is draining bile. An ethics consult is pending.          OBJECTIVE  Scheduled Meds:   oxymetazoline  2 spray Each Nare BID    ipratropium  0.5 mg Nebulization 4x daily    levalbuterol  1.25 mg Nebulization 4x Daily    sodium chloride  250 mL Intravenous Once    sodium chloride  250 mL Intravenous Once    sodium chloride  250 mL Intravenous Once    predniSONE  10 mg Oral Daily    mometasone-formoterol  2 puff Inhalation BID    pantoprazole  40 mg Oral QAM AC    metoprolol tartrate  12.5 mg Oral BID    fat emulsion  100 mL Intravenous Daily    furosemide  40 mg Intravenous Daily    magnesium sulfate  1 g Intravenous Once    insulin lispro  0-12 Units Subcutaneous Q6H    venlafaxine  37.5 mg Oral TID    aspirin  81 mg Oral Daily    sodium chloride flush  10 mL Intravenous 2 times per day       Vital Signs:  /77   Pulse 113   Temp 99.1 °F (37.3 °C) (Axillary)   Resp 20   Ht 5' (1.524 m)   Wt 102 lb 1.2 oz (46.3 kg)   SpO2 97%   BMI 19.93 kg/m²      Physical Exam:   General appearance: Alert & oriented, ill appearing NAD  Lungs: CTA bilaterally    Heart: S1S2 RRR  Abdomen: Soft, right sided abd is tender, Not distended, BS WNL cholecystostomy drainage bile colored  Skin: No jaundice, No clubbing, No cyanosis    Lab and Imaging Review     CBC  Recent Labs     05/07/19  0509  05/08/19  0515 05/08/19  1111 05/08/19  1753 05/09/19  0527   WBC 9.8  --  12.1*  --   --   --    HGB 7.1*   < > 7.2* 7.8* 6.2* 10.3*   HCT 21.6*   < > 22.4* 22.5* 23.8* 31.8*   MCV 88.1  --  88.5  --   --   --      --  343  --   --   --     < > = values in this interval not displayed. BMP  Recent Labs     05/07/19  0509 05/08/19  0515 05/09/19  0528   * 135 144   K 4.6 3.8 4.2    103 111*   CO2 24 23 23   BUN 17 19 23   CREATININE <0.40* <0.40* <0.40*   GLUCOSE 103* 120* 120*   CALCIUM 7.3* 7.3* 8.0*       LFTS  Recent Labs     05/07/19  0509 05/08/19  0515 05/09/19  0528   ALKPHOS 198* 178* 174*   ALT 17 15 13   AST 19 16 16   PROT 4.6* 4.9* 5.3*   BILITOT 0.28* 0.26* 0.64   LABALBU 1.8* 1.9* 2.0*       PT/INR  Recent Labs     05/07/19  0509 05/08/19  0515 05/09/19  0528   PROTIME 15.5* 16.1* 16.7*   INR 1.2 1.3 1.3     TECHNIQUE:4/22/19   Informed consent was obtained after a detailed explanation of the procedure   including risks, benefits, and alternatives.  Universal protocol was   followed. A suitable skin site was prepped and draped in sterile fashion   following ultrasound localization.  An 18 gauge needle was advanced under   ultrasound guidance into the gallbladder via transhepatic route and a 0.035   guidewire was used to place a 8 [place] cholecystostomy tube under   fluoroscopic guidance.  The catheter was sutured to the skin and the patient   tolerated the procedure well.  Thick bilious material was sent for laboratory   analysis.  The catheter was attached to gravity drainage.       FINDINGS:   Ultrasound image demonstrates distended gallbladder.  Bilious fluid was sent   for culture.           Impression   Successful placement of transhepatic 8 Persian percutaneous cholecystostomy   tube.      Results for Navin Gonzalez (MRN [id number]) as of 4/20/2019 09:15    Ref. Range 4/15/2019 11:10    Latest Ref Range: <38 U/mL 62 (H)   CA 19-9 Latest Ref Range: 0 - 35 U/mL 49 (H)   CEA Latest Ref Range: <3.9 ng/mL 5.6 (H)    Results for Navin Gonzalez (MRN [id number]) as of 5/4/2019 12:26    Ref. Range 4/15/2019 11:10   AFP (Alpha Fetoprotein) Latest Ref Range: <8.4 ug/L 1.8      FINDINGS:hida 4/20/19   Prompt, homogenous uptake by the liver is noted with normal appearance of   radiotracer excretion into the biliary system.  Clearance of bloodpool   activity appears appropriate.       Normal small bowel activity is seen.  Prominent activity within the common   bile duct.  The gallbladder is not visualized by the end of the exam.           Impression   Absent filling of the gallbladder consistent with acute cholecystitis.         FINDINGS:ct abd 4/14/19   Lower Chest: New moderate layering bilateral pleural effusions with bilateral   lower lobe atelectasis.       Organs: Limited evaluation due lack of intravenous contrast.  Cholelithiasis   redemonstrated.  No gallbladder wall thickening or biliary ductal dilatation. Scattered tiny hypodense lesions in the liver are too small to characterize   but statistically represent benign cysts or hemangiomas and appear unchanged. The pancreas, spleen, adrenal glands, and kidneys are unremarkable. Orelia Greg is   no hydronephrosis or urinary tract calculus.       GI/Bowel: The stomach is distended.  Enteric tube is in place.  No contrast   is seen distal to the pylorus and there is contrast reflux into the distal   esophagus. Orelia Greg is no evidence of bowel obstruction.  The appendix is not   definitely visualized.  No focal pericecal inflammatory changes are evident.       Pelvis:  The urinary bladder is decompressed by Tran catheter.  No pelvic   mass is seen.       Peritoneum/Retroperitoneum: Small amount of free fluid in the pelvic cavity. No free air or focal fluid collection.  No abnormal lymph node.  Normal   abdominal aortic caliber.  Moderate calcific atherosclerosis.       Bones/Soft Tissues: No acute osseous abnormality.  Diffuse anasarca. Moderate degenerative changes in the lumbar spine.           Impression   1. Distended, contrast filled stomach with reflux of contrast into the   esophagus.  No enteric contrast is seen distal to the pylorus suggesting   delayed gastric emptying in the setting of ileus. 2. New moderate layering bilateral pleural effusions with bilateral lower   lobe atelectasis. 3. Small amount of nonspecific free fluid in the pelvic cavity. 4. Anasarca. 5. Cholelithiasis.        FINDINGS:GB US 4/19/19   Pancreas is not well visualized.       No liver lesion.       Gallbladder wall thickening.  Gallbladder sludge.  Cholelithiasis.    Pericholecystic fluid.       Common bile duct measures at the upper limits of normal at 7 mm.           Impression   Findings suggesting acute cholecystitis.      ESOPHAGOGASTRODUODENOSCOPY   ( EGD )  DATE OF PROCEDURE: 4/16/2019      Alee Ruelas MD        POSTOPERATIVE Tonja Renteria has esophagitis.     Has a large amount of retained solid food in the upper part of the stomach and fundus.  Antrum is clear.  Pylorus wide open and did not show signs of stenosis.     OPERATION: Upper GI endoscopy          Findings:     Retropharyngeal area was grossly normal appearing     Esophagus: abnormal: Has a grade 2 esophagitis.  Appears peptic esophagitis.  Has a small sliding hiatal hernia.     Stomach:  Fundus of the stomach and body of the stomach.  With solid food.  75% of the lumen is occupied with solid food.     Antrum: No retained food.  Able to advance the scope through the pylorus without difficulty.  No pyloric stenosis seen.  No signs of outlet obstruction.     Duodenum:     Descending: normal    Bulb: normal  Minimal liquid present in the 2nd part of the duodenum.     FINDINGS:5/3/19 NM bleeding scan   Activity is seen within the blood pool, including the great vessels, heart,   liver, and spleen.  There was no abnormal accumulation of radioactivity in   the abdomen to suggest active bleeding during study acquisition.           Impression   No evidence of active GI bleeding during acquisition.          FINDINGS:ct abd 5/5/19   Evaluation is limited by motion.       Lower Chest: Moderate bilateral pleural effusions.       Organs: Multiple liver hypodensities measuring up to 4 mm are incompletely   characterized but in the absence of risk factors likely represent cysts   requiring no specific follow-up.  Cholecystostomy tube is in place.  No   adjacent focal drainable fluid collection.  No pancreatic lesion.  No   splenomegaly.  No adrenal lesion.  No hydronephrosis.  No renal lesion.       GI/Bowel: Stomach is markedly distended.  Swirled appearance of the mesentery   is seen within the mid to lower abdomen with adjacent thickened loops of   small bowel.       Peritoneum/Retroperitoneum: Atherosclerotic calcification of the abdominal   aorta without aneurysmal dilatation.  No adenopathy.       Bones/Soft Tissues: No acute soft tissue abnormality. Diffuse osteopenia. Lumbar spine degenerative changes.           Impression   Bowel obstruction which is suspected to be caused by an internal hernia.       Cholecystostomy tube is in place.  No surrounding fluid collection.           ASSESSMENT/PLAN:  1. Anemia with no overt bleeding currently  -still refusing any endoscopic procedure  -need ethics evaluation  -trend the h/h and transfuse for hgb <7    2.  SBO  -going for IR placement of NG tube, will keep at [address] [employer]  -continue the TPN  Ct ordered to re eval SBO and abdominal pain    3.cholecystitis s/pcholecystectomy tube placement 4/22/19  -ID following      This plan was formulated in collaboration with

## 2019-05-09 NOTE — PROGRESS NOTES
Infectious disease Consult Note      Patient: Di Bah  : 1950  Acct#:  [id number]     Date:  2019    Assessment:     Anemia/GI bleed followed by GI  Ileus ,possible SBO   Cholecystitis status post cholecystectomy tube  grew Candida non-albicans and one colony of ESBL Klebsiella on culture . finished ABXS course      Ecoli Emphysematous cystitis treated     Aspiration pneumonia of right lower lobe treated    Sepsis /Septic shock     Yeast growth on sputum culture suspect contamination     Acute hypoxic resp failure resolved    MRSA carrier    Urinary retention     Anemia      PCN allergy               Recommendations:     Monitor off ABXS for now  Follow CBC . Subjective:       History of Present Illness  Patient is a 77 y.o.  female admitted with Sepsis due to urinary tract infection   who is seen in consult for the same . She presented w dysuria and lower abd pain for around a week associated with vomiting ,was found hypotensive with leukocytosis . CT suggested emphysematous cystitis,possible gastric outlet obstruction. CXR showed a right lower infiltrate. High CA-19-9,CEA  Allergy to [facility] w SOB   Urine culture  grew ESCHERICHIA COLI resistant to Ampicillin ,sensitive to all other tested ABXS . Blood cultures negative . Mycoplasma IGM 0.97   YEAST MODERATE GROWTH on sputum culture   S/P EGD   with reported esophagitis. GB US Findings suggesting acute cholecystitis. HIIDA showed absent gallbladder filling. She was extubated on   Status post cholecystectomy tube  by interventional radiology,  cultures on  grew Candida non-albicans and one colony of ESBL Klebsiella. PICC line was placed   Tagged RBC scan  was negative . CT abdomen  showed no fluid collection ,Bowel obstruction which is suspected to be caused by an internal hernia. Interval history :  NG tube was placed under fluoroscopy.    She is complaining of dry mouth and generalized weakness . She is  still on TPN  HGB 11.7 s/p transfusion ,no active bleed   No fever   No WBC today   pro calcitonin nl      Past Medical History:   Diagnosis Date    Abnormal computed tomography of cervical spine     sclerotic bone appearance     CVA (cerebral vascular accident) ([address]     left  side weakness    GERD (gastroesophageal reflux disease)     Hypertension     Paraproteinemia     Weight loss       Past Surgical History:   Procedure Laterality Date    INTUBATION  4/14/2019         PACEMAKER PLACEMENT  07/2011    Pacemaker is Medtronic Revo (compatible). Leads placed in 1995 are NOT MRI compatible. Placed at [facility]. V's per Dr. Mandi Frey can not have an MRI.  UPPER GASTROINTESTINAL ENDOSCOPY N/A 4/16/2019    EGD ESOPHAGOGASTRODUODENOSCOPY @ BEDSIDE  ICU 2002 performed by Kevin Crespo MD at [id number] [facility]  No current facility-administered medications on file prior to encounter. Current Outpatient Medications on File Prior to Encounter   Medication Sig Dispense Refill    oxyCODONE-acetaminophen (PERCOCET) 5-325 MG per tablet Take 1 tablet by mouth every 6 hours as needed for Pain.  senna-docusate (PERICOLACE) 8.6-50 MG per tablet Take 1 tablet by mouth 2 times daily      budesonide-formoterol (SYMBICORT) 160-4.5 MCG/ACT AERO Inhale 2 puffs into the lungs 2 times daily      sucralfate (CARAFATE) 1 GM/10ML suspension Take 1 g by mouth 4 times daily       traZODone (DESYREL) 50 MG tablet Take 50 mg by mouth nightly      tiZANidine (ZANAFLEX) 2 MG tablet Take 2 mg by mouth every 8 hours as needed (left knee)       aspirin 81 MG tablet Take 81 mg by mouth daily      clopidogrel (PLAVIX) 75 MG tablet TAKE 1 TABLET DAILY 30 tablet 2    DOCQLACE 100 MG capsule TAKE 1 CAPSULE BY MOUTH IN THE MORNING & IN THE EVENING -DRINK PLENTYOF WATER WHILE TAKING THIS MEDICINE 30 capsule 1    amLODIPine (NORVASC) 10 MG tablet Take 10 mg by mouth daily.       LORazepam (ATIVAN) 0.5 MG tablet Take 0.5 mg by mouth every 6 hours as needed for Anxiety.  therapeutic multivitamin-minerals (THERAGRAN-M) tablet Take 1 tablet by mouth daily.  omeprazole (PRILOSEC) 20 MG capsule Take 20 mg by mouth daily.  venlafaxine (EFFEXOR) 37.5 MG tablet Take 37.5 mg by mouth 3 times daily.  Misc. Devices [facility]) [address] wheelchair with left fupper extremity support  Dx: stroke with left hemiparesis. 1 each 0           Allergies  Allergies   Allergen Reactions    Penicillins Shortness Of Breath and Rash    Amoxicillin         Social   Social History     Tobacco Use    Smoking status: Current Some Day Smoker     Packs/day: 1.00     Years: 30.00     Pack years: 30.00    Smokeless tobacco: Never Used   Substance Use Topics    Alcohol use: Not Currently     Alcohol/week: 16.8 oz     Types: 28 Glasses of wine per week                History reviewed. No pertinent family history. Review of Systems  Other than above 12 systems reviewed negative . Tolerating antibiotics. Physical Exam  BP (!) 140/83   Pulse 96   Temp 98.2 °F (36.8 °C) (Oral)   Resp 20   Ht 5' (1.524 m)   Wt 102 lb 1.2 oz (46.3 kg)   SpO2 94%   BMI 19.93 kg/m²           General Appearance: alert ,NAD . Skin: warm and dry, no rash or erythema  Head: normocephalic and atraumatic  Eyes: pupils equal, round  Neck: neck supple and non tender   Pulmonary/Chest: coarse to auscultation bilaterally- no wheezes, rales or rhonchi  Cardiovascular: normal rate, regular rhythm, normal S1 and S2, no murmurs.   Abdomen: soft,mild upper abdomen tenderness  -distended,  no masses or organomegaly, gallbladder drain with  bile drainage   Extremities: no cyanosis, clubbing   Edema   PICC line in place       Data Review:    Recent Labs     05/07/19  0509  05/08/19  0515  05/08/19  1753 05/09/19  0527 05/09/19  1244   WBC 9.8  --  12.1*  --   --   --   --    HGB 7.1*   < > 7.2*   < > 6.2* 10.3* 11.7*   HCT 21.6*   < > 22.4*   < > 23.8* 31.8* 36.3   MCV 88.1  --  88.5  --   --   --   --      --  343  --   --   --   --     < > = values in this interval not displayed. Recent Labs     05/07/19  0509 05/08/19 0515 05/09/19 0528   * 135 144   K 4.6 3.8 4.2    103 111*   CO2 24 23 23   PHOS 3.1 3.1  --    BUN 17 19 23   CREATININE <0.40* <0.40* <0.40*     Recent Labs     05/07/19  0509 05/08/19 0515 05/09/19  0528   AST 19 16 16   ALT 17 15 13   BILITOT 0.28* 0.26* 0.64   ALKPHOS 198* 178* 174*     No results for input(s): LIPASE, AMYLASE in the last 72 hours. Recent Labs     05/07/19 0509 05/08/19 0515 05/09/19 0528   PROTIME 15.5* 16.1* 16.7*   INR 1.2 1.3 1.3       Imaging Studies:                           All appropriate imaging studies and reports reviewed: Yes       Ct Abdomen Pelvis Wo Contrast Additional Contrast? Oral    Result Date: 4/14/2019  EXAMINATION: CT OF THE ABDOMEN AND PELVIS WITHOUT CONTRAST 4/14/2019 7:34 pm TECHNIQUE: CT of the abdomen and pelvis was performed without the administration of intravenous contrast. Multiplanar reformatted images are provided for review. Dose modulation, iterative reconstruction, and/or weight based adjustment of the mA/kV was utilized to reduce the radiation dose to as low as reasonably achievable. COMPARISON: 04/11/2019 HISTORY: ORDERING SYSTEM PROVIDED HISTORY: ABDOMINAL PAIN TECHNOLOGIST PROVIDED HISTORY: Water soluble contrast only please Ordering Physician Provided Reason for Exam: Abdominal pain - Vented patient. Contrast given via nurse through NG tube. Acuity: Unknown Type of Exam: Unknown Relevant Medical/Surgical History: Hx - Sepsis due to urinary tract infection. FINDINGS: Lower Chest: New moderate layering bilateral pleural effusions with bilateral lower lobe atelectasis. Organs: Limited evaluation due lack of intravenous contrast.  Cholelithiasis redemonstrated. No gallbladder wall thickening or biliary ductal dilatation.  Scattered tiny hypodense lesions in the liver are too small to characterize but statistically represent benign cysts or hemangiomas and appear unchanged. The pancreas, spleen, adrenal glands, and kidneys are unremarkable. There is no hydronephrosis or urinary tract calculus. GI/Bowel: The stomach is distended. Enteric tube is in place. No contrast is seen distal to the pylorus and there is contrast reflux into the distal esophagus. There is no evidence of bowel obstruction. The appendix is not definitely visualized. No focal pericecal inflammatory changes are evident. Pelvis: The urinary bladder is decompressed by Tran catheter. No pelvic mass is seen. Peritoneum/Retroperitoneum: Small amount of free fluid in the pelvic cavity. No free air or focal fluid collection. No abnormal lymph node. Normal abdominal aortic caliber. Moderate calcific atherosclerosis. Bones/Soft Tissues: No acute osseous abnormality. Diffuse anasarca. Moderate degenerative changes in the lumbar spine. 1. Distended, contrast filled stomach with reflux of contrast into the esophagus. No enteric contrast is seen distal to the pylorus suggesting delayed gastric emptying in the setting of ileus. 2. New moderate layering bilateral pleural effusions with bilateral lower lobe atelectasis. 3. Small amount of nonspecific free fluid in the pelvic cavity. 4. Anasarca. 5. Cholelithiasis. Xr Chest (single View Frontal)    Result Date: 4/13/2019  EXAMINATION: SINGLE XRAY VIEW OF THE CHEST 4/13/2019 7:18 am COMPARISON: April 12, 2019 HISTORY: ORDERING SYSTEM PROVIDED HISTORY: dyspnea TECHNOLOGIST PROVIDED HISTORY: dyspnea Ordering Physician Provided Reason for Exam: dyspnea Acuity: Acute Type of Exam: Subsequent/Follow-up Additional signs and symptoms: dyspnea FINDINGS: A right IJ catheter is seen with its tip terminating at the superior cavoatrial junction. The left chest wall pacemaker and leads are stable. The cardiomediastinal silhouette is stable. There is interval increased opacity at the right lung base, may be related to atelectasis versus pneumonia. There is small atelectasis and mild pleural effusion at the left lung base. There is no pneumothorax. There is no acute osseous abnormality. Interval increased opacity at the right lung base, may be related to atelectasis versus pneumonia. Small atelectasis and mild pleural effusion at the left lung, new since the prior study. Xr Femur Left (min 2 Views)    Result Date: 3/27/2019  EXAMINATION: 4 XRAY VIEWS OF THE LEFT FEMUR; 2 XRAY VIEWS OF THE LEFT KNEE; 3 XRAY VIEWS OF THE LEFT TIBIA AND FIBULA 3/27/2019 7:00 pm COMPARISON: Left hip 11/14/2015. HISTORY: ORDERING SYSTEM PROVIDED HISTORY: pain TECHNOLOGIST PROVIDED HISTORY: pain Ordering Physician Provided Reason for Exam: pt twisted wrong, lt knee pain, unable to straighten knee. Acuity: Acute Type of Exam: Initial FINDINGS: Left femur, four views: The bones are diffusely osteopenic. No acute fracture deformity. The hip joint is maintained. The femoral head projects within the acetabulum. No focal soft tissue abnormality is seen. Left knee, two views: The knee is flexed. No acute osseous abnormality. No joint effusion. Joint spaces are not optimally profiled but no significant arthritic changes are apparent. Left tib-fib, three views: There is evidence of a remote healed distal tibia fracture. No acute fracture or dislocation is seen. No focal soft tissue abnormality. No acute osseous abnormality of the left femur. No acute osseous abnormality of the left knee. No acute osseous abnormality of the left tib-fib. Healed remote distal tibia fracture. Marked osteopenia, likely due to disuse. Xr Knee Left (1-2 Views)    Result Date: 3/27/2019  EXAMINATION: 4 XRAY VIEWS OF THE LEFT FEMUR; 2 XRAY VIEWS OF THE LEFT KNEE; 3 XRAY VIEWS OF THE LEFT TIBIA AND FIBULA 3/27/2019 7:00 pm COMPARISON: Left hip 11/14/2015.  HISTORY: ORDERING SYSTEM PROVIDED HISTORY: pain TECHNOLOGIST PROVIDED HISTORY: pain Ordering Physician Provided Reason for Exam: pt twisted wrong, lt knee pain, unable to straighten knee. Acuity: Acute Type of Exam: Initial FINDINGS: Left femur, four views: The bones are diffusely osteopenic. No acute fracture deformity. The hip joint is maintained. The femoral head projects within the acetabulum. No focal soft tissue abnormality is seen. Left knee, two views: The knee is flexed. No acute osseous abnormality. No joint effusion. Joint spaces are not optimally profiled but no significant arthritic changes are apparent. Left tib-fib, three views: There is evidence of a remote healed distal tibia fracture. No acute fracture or dislocation is seen. No focal soft tissue abnormality. No acute osseous abnormality of the left femur. No acute osseous abnormality of the left knee. No acute osseous abnormality of the left tib-fib. Healed remote distal tibia fracture. Marked osteopenia, likely due to disuse. Xr Knee Left (3 Views)    Result Date: 3/30/2019  EXAMINATION: 3 XRAY VIEWS OF THE LEFT KNEE 3/30/2019 10:58 am COMPARISON: March 27, 2018 HISTORY: ORDERING SYSTEM PROVIDED HISTORY: F/u L knee pain after cast TECHNOLOGIST PROVIDED HISTORY: AP, oblique, lateral F/u L knee pain after cast FINDINGS: Marked osteopenia. Interval casting, which degrades fine osseous detail question cortical offset in the lateral femoral metaphysis. No malalignment identified. No significant joint effusion. Interval casting. Question nondisplaced fracture in the lateral femoral metaphysis. Osteopenia. Xr Tibia Fibula Left (2 Views)    Result Date: 3/27/2019  EXAMINATION: 4 XRAY VIEWS OF THE LEFT FEMUR; 2 XRAY VIEWS OF THE LEFT KNEE; 3 XRAY VIEWS OF THE LEFT TIBIA AND FIBULA 3/27/2019 7:00 pm COMPARISON: Left hip 11/14/2015.  HISTORY: ORDERING SYSTEM PROVIDED HISTORY: pain TECHNOLOGIST PROVIDED HISTORY: pain Ordering Physician Provided Reason for Exam: pt twisted wrong, lt knee pain, unable to straighten knee. Acuity: Acute Type of Exam: Initial FINDINGS: Left femur, four views: The bones are diffusely osteopenic. No acute fracture deformity. The hip joint is maintained. The femoral head projects within the acetabulum. No focal soft tissue abnormality is seen. Left knee, two views: The knee is flexed. No acute osseous abnormality. No joint effusion. Joint spaces are not optimally profiled but no significant arthritic changes are apparent. Left tib-fib, three views: There is evidence of a remote healed distal tibia fracture. No acute fracture or dislocation is seen. No focal soft tissue abnormality. No acute osseous abnormality of the left femur. No acute osseous abnormality of the left knee. No acute osseous abnormality of the left tib-fib. Healed remote distal tibia fracture. Marked osteopenia, likely due to disuse. Xr Abdomen (kub) (single Ap View)    Result Date: 4/14/2019  EXAMINATION: SINGLE SUPINE XRAY VIEW(S) OF THE ABDOMEN 4/14/2019 7:39 am COMPARISON: CT abdomen and pelvis  film from 11 April 2019 HISTORY: [address] [place] [address]: Abd Distention TECHNOLOGIST PROVIDED HISTORY: Abd Distention Ordering Physician Provided Reason for Exam: Abdominal distention. Pt was moving around in the bed. Best films at present time. Acuity: Acute Type of Exam: Initial Additional signs and symptoms: Abdominal distention. Pt was moving around in the bed. Best films at present time. FINDINGS: Portable view time stamped at 748 hours demonstrates an intestinal tube terminating in the midportion of a gaseous Spike dilated stomach. Densities are present over the stomach likely medication. Bipolar pacemaker is in situ with intact leads. Heart size is top-normal, stable. Gaseous distension of the stomach and loop of bowel in the upper mid abdomen is noted but there is gas and fecal material in the rectum.   Gastric outlet obstruction or proximal small bowel partial obstruction is suspected. No free air is noted. Midline city is present over the pelvis likely a monitor or CT small bore catheter. Vascular calcification is present in the pelvis. Persistent preferential gaseous distension of the stomach although there is some gas in the upper abdomen and gas and fecal material present in the rectosigmoid. Findings suggest gastric outlet obstruction. Ct Abdomen Pelvis W Iv Contrast    Result Date: 4/11/2019  EXAMINATION: CT OF THE ABDOMEN AND PELVIS WITH CONTRAST 4/11/2019 5:14 pm TECHNIQUE: CT of the abdomen and pelvis was performed with the administration of intravenous contrast. Multiplanar reformatted images are provided for review. Dose modulation, iterative reconstruction, and/or weight based adjustment of the mA/kV was utilized to reduce the radiation dose to as low as reasonably achievable. COMPARISON: None. HISTORY: ORDERING SYSTEM PROVIDED HISTORY: Abdominal pain TECHNOLOGIST PROVIDED HISTORY: IV Only Contrast Ordering Physician Provided Reason for Exam: patient c/o abd pain for an hour FINDINGS: Lower Chest: Trace pleural fluid bilaterally is noted. Indwelling cardiac pacemaker is present. Heart size is normal.  Coronary artery calcifications are evident. The esophagus is significantly dilated and fluid-filled. No paraesophageal adenopathy is evident. Organs: The spleen, pancreas, and adrenals are unremarkable. The liver contains hypodensities, likely cysts. The gallbladder is distended and contains a solitary gallstone. The kidneys excrete contrast bilaterally. Extrarenal collecting systems are noted. The ureters are mildly dilated down to the urinary bladder without evidence of intraluminal or ureteral obstructing calculi. GI/Bowel: Marked distention of the stomach is noted; this continues to the pylorus with appearance suggesting partial gastric outlet obstruction.   Fluid is present in some small bowel loops and colon distal to the stomach. No small bowel obstruction. Pelvis: Marked distention of the urinary bladder is noted. 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 Distended ureters may be related to reflux or infection. No free pelvic fluid, pelvic or inguinal adenopathy is noted. Peritoneum/Retroperitoneum: No aortic aneurysm. Mild to moderate plaque is noted in the infrarenal abdominal aorta and both proximal iliac arteries. Shotty lymph nodes are present around the aorta in the upper abdomen. No mesenteric adenopathy is noted. Bones/Soft Tissues: Degenerative changes are present in the hips and lower lumbar facets. Estimated biologic radiation dose for this procedure:258.77 mGy/cm2.     1. Dilatation of the thoracic esophagus filled with fluid. No obstructive process is noted. No adjacent enlarged lymph nodes. 2. Trace pleural fluid. 3. Marked distention of the stomach. This appears to extend to the pylorus. Partial gastric outlet obstruction is not excluded. 4. Bilateral dilated ureters without obstructing calculi. Urinary bladder is markedly distended with bladder wall air suggesting emphysematous cystitis. Dilatation of the ureters may be related to reflux or infection. 5. Atherosclerotic disease. 6. Other findings as above. Critical results were called by Dr. Amberly De La Paz MD to [address] on 4/11/2019 at 17:37. Xr Chest Portable    Result Date: 4/16/2019  EXAMINATION: SINGLE XRAY VIEW OF THE CHEST 4/16/2019 6:54 am COMPARISON: 04/15/2019, 610 hours HISTORY: ORDERING SYSTEM PROVIDED HISTORY: ETT placement TECHNOLOGIST PROVIDED HISTORY: ETT placement Ordering Physician Provided Reason for Exam: on vent Acuity: Acute Type of Exam: Initial 68-year-old female on ventilator; check endotracheal tube placement FINDINGS: Portable AP upright view of the chest. Endotracheal tube distal tip overlying the mid trachea approximately 4.1 cm above the level of the ron.  Enteric tube traverses the GE junction with distal tip excluded from the field of view. Left subclavian approach cardiac pacemaker device distal lead tips relatively stable in position. Right internal jugular approach central venous catheter distal tip overlying the high right atrium, stable. Cardiac monitor leads overlie the chest. Atherosclerotic calcification of the thoracic aorta. Slight stable volume loss of the left hemithorax. No pneumothorax. No free air. Dense retrocardiac/left basilar airspace consolidation and small left-sided pleural effusion. Stable mild focal opacity at the right mid lung zone. Underlying COPD. Stable mild pulmonary vascular congestion and left-sided predominant parahilar opacity. Visualized osseous structures remain unchanged. 1. Stable multifocal airspace disease as detailed above with dense retrocardiac/left basilar airspace consolidation and small left-sided pleural effusion. Mild pulmonary vascular congestion. Findings may represent edema or multifocal pneumonia. 2. Underlying COPD. 3. Tubes and line as detailed above. Xr Chest Portable    Result Date: 4/15/2019  EXAMINATION: SINGLE XRAY VIEW OF THE CHEST 4/15/2019 6:47 am COMPARISON: 14 April 2019 HISTORY: ORDERING SYSTEM PROVIDED HISTORY: ETT placement TECHNOLOGIST PROVIDED HISTORY: ETT placement Ordering Physician Provided Reason for Exam: on vent Acuity: Acute Type of Exam: Initial FINDINGS: AP portable view of the chest time stamped at 612 hours demonstrates overlying cardiac monitoring electrodes. Endotracheal tube terminates 4 cm above the ron. Bipolar pacemaker enters from the left with intact leads in appropriate positions. Intestinal tube extends beyond the fundus of the stomach, tip not included. Right internal jugular catheter terminates at the cavoatrial junction. Heart size is normal.  Aortic arch is calcified.   There is interval improvement in vascular congestion with resolution of perihilar opacities. Some bibasilar opacities remain. No extrapleural air is noted. Osseous structures are stable. Interval improvement in vascular congestion and bilateral opacities consistent with resolving pulmonary edema. Tubes and lines as above. Xr Chest Portable    Result Date: 4/14/2019  EXAMINATION: SINGLE XRAY VIEW OF THE CHEST 4/14/2019 7:57 am COMPARISON: Portable chest 04/13/2019. HISTORY: ORDERING SYSTEM PROVIDED HISTORY: Intubation TECHNOLOGIST PROVIDED HISTORY: Intubation Ordering Physician Provided Reason for Exam: intubation Acuity: Acute Type of Exam: Initial Additional signs and symptoms: intubation FINDINGS: Endotracheal tube terminates over the midthoracic trachea. Dual-chamber pacemaker leads appear unchanged in position. Right IJ approach central venous catheter unchanged in position. Heart size not substantially changed. Perihilar and basilar opacities further increased. Left pleural effusion increased in size. Findings may reflect pulmonary edema, progressed from yesterday's exam.  Left pleural effusion increased in size. Xr Chest Portable    Result Date: 4/12/2019  EXAMINATION: SINGLE XRAY VIEW OF THE CHEST 4/12/2019 5:26 am COMPARISON: November 14, 2015. HISTORY: ORDERING SYSTEM PROVIDED HISTORY: line placement TECHNOLOGIST PROVIDED HISTORY: line placement Ordering Physician Provided Reason for Exam: New right side line placement. Acuity: Acute Type of Exam: Initial Additional signs and symptoms: New right side line placement. FINDINGS: Stable left pectoral trans venous cardiac pacer device. New right IJ central venous catheter with tip near the superior atrial caval junction. Normal lung volume. No new consolidation. Curvilinear radiopacity projecting over the right upper lobe likely represents artifact from a skin fold. No pleural effusion or pneumothorax. Stable cardiomediastinal silhouette and great vessels with redemonstration of atherosclerotic thoracic aorta. New right IJ central venous catheter with tip near the superior atrial caval junction. No pneumothorax. No new consolidation. Thank you for allowing me to participate in the care of your patient. Please feel free to contact me with any questions or concerns.      Socorro Sandra MD

## 2019-05-09 NOTE — PROGRESS NOTES
NATALIA.   · Wound Type: Pressure Ulcer, Stage II, Deep Tissue Injury(Wound under cast)  · Current Nutrition Therapies:  · Oral Diet Orders: NPO   · Parenteral Nutrition Orders:  · Type and Formula: Premix Central, 2-in-1 Custom   · Lipids: 100ml, Daily  · Rate/Volume: 65 ml/ 1560 ml daily  · Duration: Continuous  · Current PN Order Provides: 1573 kcals, 78 gm of protein (lipids included)  · Goal PN Orders Provides: 6695-2122 kcal; 63-68 gm pro  · Anthropometric Measures:  · Ht: 5' (152.4 cm)   · Current Body Wt: 102 lb 1.2 oz (46.3 kg)  · Admission Body Wt: 102 lb (46.3 kg)  · Ideal Body Wt: 100 lb (45.4 kg), % Ideal Body 102%  · BMI Classification: BMI 18.5 - 24.9 Normal Weight(Based on admission wt)    Nutrition Interventions:   Continue NPO, Modify current Parenteral Nutrition  Continued Inpatient Monitoring    Nutrition Evaluation:   · Evaluation: Progressing toward goals   · Goals: PN to meet greater 90% of estimated nutrition needs   · Monitoring: Nutrition Progression, PN Intake, PN Tolerance, Wound Healing, I&O, Weight, Pertinent Labs, Monitor Bowel Function, Mental Status/Confusion    Cherry Melgoza R.D., L.D.   Phone: [phone]

## 2019-05-09 NOTE — PROGRESS NOTES
Physical Therapy  DATE: 2019    NAME: Jonas Grady  MRN: [id number]   : 1950    Patient not seen this date for Physical Therapy due to:  [] Blood transfusion in progress  [] Cancel by RN  [] Hemodialysis  []  Refusal by Patient   [] Spine Precautions   [] Strict Bedrest  [] Surgery  [] Testing      [x] Other  09:40 AM   Off floor for procedure. [] PT being discontinued at this time. Patient independent. No further needs. [] PT being discontinued at this time as the patient has been transferred to hospice care. No further needs.     Abdirahman Schwarz, PTA

## 2019-05-09 NOTE — FLOWSHEET NOTE
05/09/19 1256   Encounter Summary   Services provided to: Patient   Referral/Consult From: Ольга   Continue Visiting   (5-9-19)   Volunteer Visit Yes   Complexity of Encounter Low   Length of Encounter 15 minutes   Routine   Type Follow up   Spiritual/Temple   Type Spiritual support   Intervention Prayer

## 2019-05-09 NOTE — PROGRESS NOTES
Pulmonary Progress Note  [facility]      Patient - Gatito Gutierrez,  Age - 77 y.o.    - 1950      Room Number - 2123/2123-01   [facility] -  [id number]   [facility]  Date of Admission -  2019  2:48 PM        Supriya Olson MD  Primary Care Physician - Soni Mcknight, DO     SUBJECTIVE   Screaming \"help me\" when asked what the problem was she says that she wants water. NG tube placed successfully under fluoroscopy    OBJECTIVE   VITALS    height is 5' (1.524 m) and weight is 102 lb 1.2 oz (46.3 kg). Her axillary temperature is 99.1 °F (37.3 °C). Her blood pressure is 135/77 and her pulse is 113. Her respiration is 20 and oxygen saturation is 97%. Body mass index is 19.93 kg/m². Temperature Range: Temp: 99.1 °F (37.3 °C) Temp  Av.1 °F (36.7 °C)  Min: 97.2 °F (36.2 °C)  Max: 99.1 °F (37.3 °C)  BP Range:  Systolic (57ROQ), KSU:556 , Min:110 , KTC:424     Diastolic (31QSS), YVX:45, Min:59, Max:81    Pulse Range: Pulse  Av.9  Min: 72  Max: 123  Respiration Range: Resp  Av.6  Min: 16  Max: 20  Current Pulse Ox[de-identified]  SpO2: 97 %  24HR Pulse Ox Range:  SpO2  Av.5 %  Min: 96 %  Max: 100 %  Oxygen Amount and Delivery: O2 Flow Rate (L/min): 0 L/min    Wt Readings from Last 3 Encounters:   19 102 lb 1.2 oz (46.3 kg)   19 89 lb (40.4 kg)   19 94 lb 1.6 oz (42.7 kg)       I/O (24 Hours)    Intake/Output Summary (Last 24 hours) at 2019 1232  Last data filed at 2019 0741  Gross per 24 hour   Intake 5157.67 ml   Output 525 ml   Net 4632.67 ml       EXAM     General Appearance  Awake, alert, frail   HEENT - normocephalic, atraumatic.  []  Mallampati  [] Crowded airway   [] Macroglossia  []  Retrognathia  [] Micrognathia  []  Normal tongue size []  Normal Bite  [] Yves sign positive    Neck - Supple,  trachea midline   Lungs - diminished without wheezes or rhonchi  Cardiovascular - Heart sounds are normal.  Regular rate and rhythm   Abdomen - Soft, nontender, nondistended, no masses or organomegaly  Neurologic - There are no focal motor or sensory deficits  Skin - No bruising or bleeding  Extremities - No clubbing, cyanosis, edema    MEDS      oxymetazoline  2 spray Each Nare BID    ipratropium  0.5 mg Nebulization 4x daily    levalbuterol  1.25 mg Nebulization 4x Daily    sodium chloride  250 mL Intravenous Once    sodium chloride  250 mL Intravenous Once    predniSONE  10 mg Oral Daily    mometasone-formoterol  2 puff Inhalation BID    pantoprazole  40 mg Oral QAM AC    metoprolol tartrate  12.5 mg Oral BID    fat emulsion  100 mL Intravenous Daily    furosemide  40 mg Intravenous Daily    magnesium sulfate  1 g Intravenous Once    insulin lispro  0-12 Units Subcutaneous Q6H    venlafaxine  37.5 mg Oral TID    aspirin  81 mg Oral Daily    sodium chloride flush  10 mL Intravenous 2 times per day      PN-Adult 2-in-1 Central Line (Standard) 65 mL/hr at 05/08/19 1831    dextrose       diatrizoate meglumine-sodium, LORazepam, sodium chloride, sodium chloride flush, morphine, LORazepam, ondansetron, zolpidem, sodium chloride flush, hydrOXYzine, metoprolol, sodium chloride nebulizer, fentanNYL, oxyCODONE-acetaminophen, magnesium sulfate, sodium phosphate IVPB **OR** sodium phosphate IVPB, potassium chloride **OR** potassium alternative oral replacement **OR** potassium chloride, glucose, dextrose, glucagon (rDNA), dextrose, milk and molasses, sodium chloride flush, acetaminophen    LABS   CBC   Recent Labs     05/08/19  0515  05/09/19  0527   WBC 12.1*  --   --    HGB 7.2*   < > 10.3*   HCT 22.4*   < > 31.8*   MCV 88.5  --   --      --   --     < > = values in this interval not displayed.      BMP:   Lab Results   Component Value Date     05/09/2019    K 4.2 05/09/2019     05/09/2019    CO2 23 05/09/2019    BUN 23 05/09/2019    LABALBU 2.0 05/09/2019    LABALBU 4.6 05/17/2012    CREATININE <0.40 05/09/2019    CALCIUM 8.0 05/09/2019    GFRAA CANNOT BE CALCULATED 05/09/2019    LABGLOM CANNOT BE CALCULATED 05/09/2019     ABGs:  Lab Results   Component Value Date    PHART 7.519 04/19/2019    PO2ART 73.3 04/19/2019    LIE5XLQ 42.5 04/19/2019      Lab Results   Component Value Date    MODE PRVC 04/19/2019     Ionized Calcium:  No results found for: IONCA  Magnesium:    Lab Results   Component Value Date    MG 1.6 05/08/2019     Phosphorus:    Lab Results   Component Value Date    PHOS 3.1 05/08/2019        LIVER PROFILE   Recent Labs     05/09/19 0528   AST 16   ALT 13   BILITOT 0.64   ALKPHOS 174*     INR   Recent Labs     05/09/19 0528   INR 1.3     PTT   Lab Results   Component Value Date    APTT 27.7 03/28/2012         RADIOLOGY     (See actual reports for details)    ASSESSMENT/PLAN   Principal Problem:    GI bleed  Active Problems:    Sepsis due to urinary tract infection (HCC)    Cystitis    Emphysematous cystitis    Septic shock (HCC)    Leukemoid reaction    Aspiration pneumonia of right lower lobe due to vomit (HCC)    MRSA carrier    Urinary retention    Abdominal distention    Elevated CEA    Elevated CA 19-9 level    Cholecystitis    CRP elevated    Elevated procalcitonin    Bandemia    Centrilobular emphysema (HCC)    Rectal bleeding    Anemia    Small bowel obstruction (HCC)    Anxiety disorder    Noncompliance  Resolved Problems:    * No resolved hospital problems.  *    Will decrease frequency of aerosols 3 times a day  There is no family present and they need to be involved in her care, or guardianship needs to be appointed  Received blood yesterday, no obvious active bleeding  Continue to monitor  Electronically signed by Marilin Aguirre MD on 5/9/2019 at 12:32 PM

## 2019-05-09 NOTE — PROGRESS NOTES
General Surgery Progress Note            Discussed with nurse  No change  Plan for NGT placement by IR  Will request SBFT if NGT is placed  If patient refuses, plan is for ECF placement

## 2019-05-09 NOTE — FLOWSHEET NOTE
Patient was restless and crying out. Writer offered prayer w/o response from patient.      05/09/19 1410   Encounter Summary   Services provided to: Patient   Referral/Consult From: Palliative Care   Continue Visiting   (5-9-19)   Complexity of Encounter Moderate   Length of Encounter 15 minutes   Routine   Type Follow up   Spiritual/Synagogue   Type Spiritual support   Intervention Prayer   Outcome Did not respond

## 2019-05-09 NOTE — PROGRESS NOTES
[address]   OCCUPATIONAL THERAPY MISSED TREATMENT NOTE   INPATIENT   Date: 19  Patient Name: Elo Worley       Room: 0361/5477-77  MRN: [id number]   Account #: [de-identified]    : 1950  (77 y.o.)  Gender: female   Referring Practitioner: Cristobal Patel MD  Diagnosis: Sepsis due to UTI             REASON FOR MISSED TREATMENT:  Patient at testing and/or off the floor   -   Per Redd POWELL/KORINA  This patient  Elo Worley  was not seen by the Occupational Therapy Student identified above.  The above documentation completed by KORINA Student  was reviewed and accepted by the Supervising Clinical Instructor   Electronically signed by Shelley CONSTANTINO on 19 at 11:03 AM  .

## 2019-05-10 ENCOUNTER — APPOINTMENT (OUTPATIENT)
Dept: GENERAL RADIOLOGY | Age: 69
DRG: 853 | End: 2019-05-10
Payer: COMMERCIAL

## 2019-05-10 LAB
-: ABNORMAL
-: NORMAL
ALLEN TEST: ABNORMAL
AMORPHOUS: ABNORMAL
ANION GAP SERPL CALCULATED.3IONS-SCNC: 10 MMOL/L (ref 9–17)
ANION GAP SERPL CALCULATED.3IONS-SCNC: 12 MMOL/L (ref 9–17)
ANION GAP SERPL CALCULATED.3IONS-SCNC: 14 MMOL/L (ref 9–17)
ANION GAP SERPL CALCULATED.3IONS-SCNC: 15 MMOL/L (ref 9–17)
BACTERIA: ABNORMAL
BILIRUBIN URINE: NEGATIVE
BUN BLDV-MCNC: 36 MG/DL (ref 8–23)
BUN BLDV-MCNC: 37 MG/DL (ref 8–23)
BUN BLDV-MCNC: 38 MG/DL (ref 8–23)
BUN BLDV-MCNC: 39 MG/DL (ref 8–23)
BUN/CREAT BLD: ABNORMAL (ref 9–20)
CALCIUM SERPL-MCNC: 7.8 MG/DL (ref 8.6–10.4)
CALCIUM SERPL-MCNC: 7.9 MG/DL (ref 8.6–10.4)
CALCIUM SERPL-MCNC: 8.3 MG/DL (ref 8.6–10.4)
CALCIUM SERPL-MCNC: 8.8 MG/DL (ref 8.6–10.4)
CARBOXYHEMOGLOBIN: 0.7 % (ref 0–5)
CASTS UA: ABNORMAL /LPF
CHLORIDE BLD-SCNC: 115 MMOL/L (ref 98–107)
CHLORIDE BLD-SCNC: 118 MMOL/L (ref 98–107)
CHLORIDE BLD-SCNC: 121 MMOL/L (ref 98–107)
CHLORIDE BLD-SCNC: 121 MMOL/L (ref 98–107)
CO2: 19 MMOL/L (ref 20–31)
CO2: 21 MMOL/L (ref 20–31)
CO2: 21 MMOL/L (ref 20–31)
CO2: 22 MMOL/L (ref 20–31)
COLOR: YELLOW
COMMENT UA: ABNORMAL
CORTISOL COLLECTION INFO: ABNORMAL
CORTISOL: 52 UG/DL (ref 2.7–18.4)
CREAT SERPL-MCNC: 0.43 MG/DL (ref 0.5–0.9)
CREAT SERPL-MCNC: 0.44 MG/DL (ref 0.5–0.9)
CREAT SERPL-MCNC: 0.55 MG/DL (ref 0.5–0.9)
CREAT SERPL-MCNC: <0.4 MG/DL (ref 0.5–0.9)
CRYSTALS, UA: ABNORMAL /HPF
EPITHELIAL CELLS UA: ABNORMAL /HPF
FIO2: 40
GFR AFRICAN AMERICAN: >60 ML/MIN
GFR AFRICAN AMERICAN: ABNORMAL ML/MIN
GFR NON-AFRICAN AMERICAN: >60 ML/MIN
GFR NON-AFRICAN AMERICAN: ABNORMAL ML/MIN
GFR SERPL CREATININE-BSD FRML MDRD: ABNORMAL ML/MIN/{1.73_M2}
GLUCOSE BLD-MCNC: 116 MG/DL (ref 70–99)
GLUCOSE BLD-MCNC: 127 MG/DL (ref 65–105)
GLUCOSE BLD-MCNC: 134 MG/DL (ref 65–105)
GLUCOSE BLD-MCNC: 140 MG/DL (ref 65–105)
GLUCOSE BLD-MCNC: 146 MG/DL (ref 70–99)
GLUCOSE BLD-MCNC: 169 MG/DL (ref 70–99)
GLUCOSE BLD-MCNC: 179 MG/DL (ref 70–99)
GLUCOSE BLD-MCNC: 180 MG/DL (ref 65–105)
GLUCOSE BLD-MCNC: 192 MG/DL (ref 65–105)
GLUCOSE BLD-MCNC: 93 MG/DL (ref 65–105)
GLUCOSE URINE: NEGATIVE
HCO3 ARTERIAL: 21.4 MMOL/L (ref 22–26)
HCT VFR BLD CALC: 34.7 % (ref 36–46)
HCT VFR BLD CALC: 35 % (ref 36–46)
HCT VFR BLD CALC: 39.9 % (ref 36–46)
HEMOGLOBIN: 10.7 G/DL (ref 12–16)
HEMOGLOBIN: 10.7 G/DL (ref 12–16)
HEMOGLOBIN: 12.7 G/DL (ref 12–16)
INR BLD: 1.4
KETONES, URINE: NEGATIVE
LACTIC ACID: 1.5 MMOL/L (ref 0.5–2.2)
LACTIC ACID: 2.2 MMOL/L (ref 0.5–2.2)
LEUKOCYTE ESTERASE, URINE: NEGATIVE
MAGNESIUM: 2.5 MG/DL (ref 1.6–2.6)
MCH RBC QN AUTO: 27.8 PG (ref 26–34)
MCHC RBC AUTO-ENTMCNC: 31.9 G/DL (ref 31–37)
MCV RBC AUTO: 87.2 FL (ref 80–100)
METHEMOGLOBIN: 0.3 % (ref 0–1.9)
MODE: ABNORMAL
MUCUS: ABNORMAL
NEGATIVE BASE EXCESS, ART: 3.8 MMOL/L (ref 0–2)
NITRITE, URINE: NEGATIVE
NOTIFICATION TIME: ABNORMAL
NOTIFICATION: ABNORMAL
NRBC AUTOMATED: ABNORMAL PER 100 WBC
O2 DEVICE/FLOW/%: ABNORMAL
O2 SAT, ARTERIAL: 92.6 % (ref 95–98)
OTHER OBSERVATIONS UA: ABNORMAL
OXYHEMOGLOBIN: ABNORMAL % (ref 95–98)
PATIENT TEMP: 37
PCO2 ARTERIAL: 37.1 MMHG (ref 35–45)
PCO2, ART, TEMP ADJ: ABNORMAL (ref 35–45)
PDW BLD-RTO: 16.6 % (ref 11.5–14.9)
PEEP/CPAP: ABNORMAL
PH ARTERIAL: 7.37 (ref 7.35–7.45)
PH UA: 5 (ref 5–8)
PH, ART, TEMP ADJ: ABNORMAL (ref 7.35–7.45)
PHOSPHORUS: 5.2 MG/DL (ref 2.6–4.5)
PLATELET # BLD: 449 K/UL (ref 150–450)
PMV BLD AUTO: 8.8 FL (ref 6–12)
PO2 ARTERIAL: 68.2 MMHG (ref 80–100)
PO2, ART, TEMP ADJ: ABNORMAL MMHG (ref 80–100)
POSITIVE BASE EXCESS, ART: ABNORMAL MMOL/L (ref 0–2)
POTASSIUM SERPL-SCNC: 3.8 MMOL/L (ref 3.7–5.3)
POTASSIUM SERPL-SCNC: 4.3 MMOL/L (ref 3.7–5.3)
POTASSIUM SERPL-SCNC: 4.8 MMOL/L (ref 3.7–5.3)
POTASSIUM SERPL-SCNC: 5.8 MMOL/L (ref 3.7–5.3)
PROCALCITONIN: 0.22 NG/ML
PROTEIN UA: NEGATIVE
PROTHROMBIN TIME: 16.8 SEC (ref 11.8–14.6)
PSV: ABNORMAL
PT. POSITION: ABNORMAL
RBC # BLD: 4.58 M/UL (ref 4–5.2)
RBC UA: ABNORMAL /HPF
REASON FOR REJECTION: NORMAL
RENAL EPITHELIAL, UA: ABNORMAL /HPF
RESPIRATORY RATE: 24
SAMPLE SITE: ABNORMAL
SET RATE: 20
SODIUM BLD-SCNC: 147 MMOL/L (ref 135–144)
SODIUM BLD-SCNC: 152 MMOL/L (ref 135–144)
SODIUM BLD-SCNC: 154 MMOL/L (ref 135–144)
SODIUM BLD-SCNC: 156 MMOL/L (ref 135–144)
SPECIFIC GRAVITY UA: 1.02 (ref 1–1.03)
TEXT FOR RESPIRATORY: ABNORMAL
TOTAL HB: ABNORMAL G/DL (ref 12–16)
TOTAL RATE: 24
TRICHOMONAS: ABNORMAL
TURBIDITY: CLEAR
URINE HGB: ABNORMAL
UROBILINOGEN, URINE: NORMAL
VT: ABNORMAL
WBC # BLD: 28.5 K/UL (ref 3.5–11)
WBC UA: ABNORMAL /HPF
YEAST: ABNORMAL
ZZ NTE CLEAN UP: ORDERED TEST: NORMAL
ZZ NTE WITH NAME CLEAN UP: SPECIMEN SOURCE: NORMAL

## 2019-05-10 PROCEDURE — 99233 SBSQ HOSP IP/OBS HIGH 50: CPT | Performed by: INTERNAL MEDICINE

## 2019-05-10 PROCEDURE — 82805 BLOOD GASES W/O2 SATURATION: CPT

## 2019-05-10 PROCEDURE — 81001 URINALYSIS AUTO W/SCOPE: CPT

## 2019-05-10 PROCEDURE — 2500000003 HC RX 250 WO HCPCS: Performed by: FAMILY MEDICINE

## 2019-05-10 PROCEDURE — 2000000000 HC ICU R&B

## 2019-05-10 PROCEDURE — 71045 X-RAY EXAM CHEST 1 VIEW: CPT

## 2019-05-10 PROCEDURE — 74018 RADEX ABDOMEN 1 VIEW: CPT

## 2019-05-10 PROCEDURE — 2700000000 HC OXYGEN THERAPY PER DAY

## 2019-05-10 PROCEDURE — 84145 PROCALCITONIN (PCT): CPT

## 2019-05-10 PROCEDURE — 83605 ASSAY OF LACTIC ACID: CPT

## 2019-05-10 PROCEDURE — 83735 ASSAY OF MAGNESIUM: CPT

## 2019-05-10 PROCEDURE — 2500000003 HC RX 250 WO HCPCS: Performed by: INTERNAL MEDICINE

## 2019-05-10 PROCEDURE — 6360000002 HC RX W HCPCS: Performed by: INTERNAL MEDICINE

## 2019-05-10 PROCEDURE — 82533 TOTAL CORTISOL: CPT

## 2019-05-10 PROCEDURE — 2580000003 HC RX 258: Performed by: STUDENT IN AN ORGANIZED HEALTH CARE EDUCATION/TRAINING PROGRAM

## 2019-05-10 PROCEDURE — 2580000003 HC RX 258: Performed by: NURSE PRACTITIONER

## 2019-05-10 PROCEDURE — 2580000003 HC RX 258: Performed by: INTERNAL MEDICINE

## 2019-05-10 PROCEDURE — 6370000000 HC RX 637 (ALT 250 FOR IP): Performed by: INTERNAL MEDICINE

## 2019-05-10 PROCEDURE — 87205 SMEAR GRAM STAIN: CPT

## 2019-05-10 PROCEDURE — 80048 BASIC METABOLIC PNL TOTAL CA: CPT

## 2019-05-10 PROCEDURE — 94761 N-INVAS EAR/PLS OXIMETRY MLT: CPT

## 2019-05-10 PROCEDURE — 85014 HEMATOCRIT: CPT

## 2019-05-10 PROCEDURE — 82947 ASSAY GLUCOSE BLOOD QUANT: CPT

## 2019-05-10 PROCEDURE — 94640 AIRWAY INHALATION TREATMENT: CPT

## 2019-05-10 PROCEDURE — 85027 COMPLETE CBC AUTOMATED: CPT

## 2019-05-10 PROCEDURE — 6360000002 HC RX W HCPCS: Performed by: NURSE PRACTITIONER

## 2019-05-10 PROCEDURE — 99232 SBSQ HOSP IP/OBS MODERATE 35: CPT | Performed by: INTERNAL MEDICINE

## 2019-05-10 PROCEDURE — 85018 HEMOGLOBIN: CPT

## 2019-05-10 PROCEDURE — 36600 WITHDRAWAL OF ARTERIAL BLOOD: CPT

## 2019-05-10 PROCEDURE — 97110 THERAPEUTIC EXERCISES: CPT

## 2019-05-10 PROCEDURE — 85610 PROTHROMBIN TIME: CPT

## 2019-05-10 PROCEDURE — 93005 ELECTROCARDIOGRAM TRACING: CPT

## 2019-05-10 PROCEDURE — 2580000003 HC RX 258: Performed by: FAMILY MEDICINE

## 2019-05-10 PROCEDURE — 84100 ASSAY OF PHOSPHORUS: CPT

## 2019-05-10 PROCEDURE — 6360000002 HC RX W HCPCS: Performed by: STUDENT IN AN ORGANIZED HEALTH CARE EDUCATION/TRAINING PROGRAM

## 2019-05-10 PROCEDURE — 6370000000 HC RX 637 (ALT 250 FOR IP): Performed by: STUDENT IN AN ORGANIZED HEALTH CARE EDUCATION/TRAINING PROGRAM

## 2019-05-10 PROCEDURE — 2500000003 HC RX 250 WO HCPCS: Performed by: NURSE PRACTITIONER

## 2019-05-10 PROCEDURE — APPNB30 APP NON BILLABLE TIME 0-30 MINS: Performed by: NURSE PRACTITIONER

## 2019-05-10 PROCEDURE — 87040 BLOOD CULTURE FOR BACTERIA: CPT

## 2019-05-10 PROCEDURE — 87149 DNA/RNA DIRECT PROBE: CPT

## 2019-05-10 PROCEDURE — 36415 COLL VENOUS BLD VENIPUNCTURE: CPT

## 2019-05-10 PROCEDURE — 94660 CPAP INITIATION&MGMT: CPT

## 2019-05-10 PROCEDURE — 2500000003 HC RX 250 WO HCPCS: Performed by: SURGERY

## 2019-05-10 RX ORDER — 0.9 % SODIUM CHLORIDE 0.9 %
1000 INTRAVENOUS SOLUTION INTRAVENOUS ONCE
Status: COMPLETED | OUTPATIENT
Start: 2019-05-10 | End: 2019-05-10

## 2019-05-10 RX ORDER — FUROSEMIDE 10 MG/ML
40 INJECTION INTRAMUSCULAR; INTRAVENOUS ONCE
Status: COMPLETED | OUTPATIENT
Start: 2019-05-10 | End: 2019-05-10

## 2019-05-10 RX ORDER — DEXTROSE MONOHYDRATE 25 G/50ML
25 INJECTION, SOLUTION INTRAVENOUS ONCE
Status: COMPLETED | OUTPATIENT
Start: 2019-05-10 | End: 2019-05-10

## 2019-05-10 RX ORDER — MORPHINE SULFATE 2 MG/ML
2 INJECTION, SOLUTION INTRAMUSCULAR; INTRAVENOUS ONCE
Status: COMPLETED | OUTPATIENT
Start: 2019-05-10 | End: 2019-05-10

## 2019-05-10 RX ORDER — FUROSEMIDE 10 MG/ML
20 INJECTION INTRAMUSCULAR; INTRAVENOUS ONCE
Status: COMPLETED | OUTPATIENT
Start: 2019-05-10 | End: 2019-05-10

## 2019-05-10 RX ORDER — HEPARIN SODIUM 5000 [USP'U]/ML
5000 INJECTION, SOLUTION INTRAVENOUS; SUBCUTANEOUS 2 TIMES DAILY
Status: DISCONTINUED | OUTPATIENT
Start: 2019-05-10 | End: 2019-05-15

## 2019-05-10 RX ORDER — LEVALBUTEROL 1.25 MG/.5ML
1.25 SOLUTION, CONCENTRATE RESPIRATORY (INHALATION) EVERY 4 HOURS
Status: DISCONTINUED | OUTPATIENT
Start: 2019-05-10 | End: 2019-05-15

## 2019-05-10 RX ADMIN — INSULIN HUMAN 10 UNITS: 100 INJECTION, SOLUTION PARENTERAL at 17:17

## 2019-05-10 RX ADMIN — FENTANYL CITRATE 50 MCG: 50 INJECTION INTRAMUSCULAR; INTRAVENOUS at 20:00

## 2019-05-10 RX ADMIN — LEVALBUTEROL 1.25 MG: 1.25 SOLUTION, CONCENTRATE RESPIRATORY (INHALATION) at 14:54

## 2019-05-10 RX ADMIN — SODIUM CHLORIDE 1000 ML: 9 INJECTION, SOLUTION INTRAVENOUS at 07:58

## 2019-05-10 RX ADMIN — SODIUM CHLORIDE 1000 ML: 9 INJECTION, SOLUTION INTRAVENOUS at 17:33

## 2019-05-10 RX ADMIN — LEVALBUTEROL 1.25 MG: 1.25 SOLUTION, CONCENTRATE RESPIRATORY (INHALATION) at 07:11

## 2019-05-10 RX ADMIN — LEVALBUTEROL 1.25 MG: 1.25 SOLUTION, CONCENTRATE RESPIRATORY (INHALATION) at 23:47

## 2019-05-10 RX ADMIN — MEROPENEM 1 G: 1 INJECTION, POWDER, FOR SOLUTION INTRAVENOUS at 18:59

## 2019-05-10 RX ADMIN — VANCOMYCIN HYDROCHLORIDE 750 MG: 5 INJECTION, POWDER, LYOPHILIZED, FOR SOLUTION INTRAVENOUS at 23:03

## 2019-05-10 RX ADMIN — NOREPINEPHRINE BITARTRATE 2 MCG/MIN: 1 INJECTION INTRAVENOUS at 08:38

## 2019-05-10 RX ADMIN — FUROSEMIDE 20 MG: 10 INJECTION, SOLUTION INTRAMUSCULAR; INTRAVENOUS at 17:23

## 2019-05-10 RX ADMIN — FENTANYL CITRATE 50 MCG: 50 INJECTION INTRAMUSCULAR; INTRAVENOUS at 17:23

## 2019-05-10 RX ADMIN — MEROPENEM 1 G: 1 INJECTION, POWDER, FOR SOLUTION INTRAVENOUS at 10:29

## 2019-05-10 RX ADMIN — DEXMEDETOMIDINE HYDROCHLORIDE 0.2 MCG/KG/HR: 100 INJECTION, SOLUTION INTRAVENOUS at 03:03

## 2019-05-10 RX ADMIN — DEXTROSE MONOHYDRATE 25 G: 500 INJECTION PARENTERAL at 17:23

## 2019-05-10 RX ADMIN — FENTANYL CITRATE 50 MCG: 50 INJECTION INTRAMUSCULAR; INTRAVENOUS at 00:44

## 2019-05-10 RX ADMIN — HEPARIN SODIUM 5000 UNITS: 5000 INJECTION, SOLUTION INTRAVENOUS; SUBCUTANEOUS at 10:28

## 2019-05-10 RX ADMIN — IPRATROPIUM BROMIDE 0.5 MG: 0.5 SOLUTION RESPIRATORY (INHALATION) at 14:54

## 2019-05-10 RX ADMIN — IPRATROPIUM BROMIDE 0.5 MG: 0.5 SOLUTION RESPIRATORY (INHALATION) at 07:10

## 2019-05-10 RX ADMIN — FUROSEMIDE 40 MG: 10 INJECTION, SOLUTION INTRAMUSCULAR; INTRAVENOUS at 01:32

## 2019-05-10 RX ADMIN — METRONIDAZOLE 500 MG: 500 INJECTION, SOLUTION INTRAVENOUS at 06:22

## 2019-05-10 RX ADMIN — IPRATROPIUM BROMIDE 0.5 MG: 0.5 SOLUTION RESPIRATORY (INHALATION) at 10:38

## 2019-05-10 RX ADMIN — IPRATROPIUM BROMIDE 0.5 MG: 0.5 SOLUTION RESPIRATORY (INHALATION) at 23:47

## 2019-05-10 RX ADMIN — LEVALBUTEROL 1.25 MG: 1.25 SOLUTION, CONCENTRATE RESPIRATORY (INHALATION) at 19:54

## 2019-05-10 RX ADMIN — HEPARIN SODIUM 5000 UNITS: 5000 INJECTION, SOLUTION INTRAVENOUS; SUBCUTANEOUS at 20:01

## 2019-05-10 RX ADMIN — DEXMEDETOMIDINE HYDROCHLORIDE 0.8 MCG/KG/HR: 100 INJECTION, SOLUTION INTRAVENOUS at 19:42

## 2019-05-10 RX ADMIN — CEFTRIAXONE SODIUM 1 G: 1 INJECTION, POWDER, FOR SOLUTION INTRAMUSCULAR; INTRAVENOUS at 04:45

## 2019-05-10 RX ADMIN — IPRATROPIUM BROMIDE 0.5 MG: 0.5 SOLUTION RESPIRATORY (INHALATION) at 19:54

## 2019-05-10 RX ADMIN — LEVALBUTEROL 1.25 MG: 1.25 SOLUTION, CONCENTRATE RESPIRATORY (INHALATION) at 10:38

## 2019-05-10 RX ADMIN — INSULIN LISPRO 1 UNITS: 100 INJECTION, SOLUTION INTRAVENOUS; SUBCUTANEOUS at 01:42

## 2019-05-10 RX ADMIN — CALCIUM GLUCONATE: 94 INJECTION, SOLUTION INTRAVENOUS at 19:21

## 2019-05-10 RX ADMIN — VANCOMYCIN HYDROCHLORIDE 750 MG: 5 INJECTION, POWDER, LYOPHILIZED, FOR SOLUTION INTRAVENOUS at 11:47

## 2019-05-10 RX ADMIN — MORPHINE SULFATE 2 MG: 2 INJECTION, SOLUTION INTRAMUSCULAR; INTRAVENOUS at 01:32

## 2019-05-10 RX ADMIN — FENTANYL CITRATE 50 MCG: 50 INJECTION INTRAMUSCULAR; INTRAVENOUS at 07:29

## 2019-05-10 RX ADMIN — FENTANYL CITRATE 50 MCG: 50 INJECTION INTRAMUSCULAR; INTRAVENOUS at 14:01

## 2019-05-10 RX ADMIN — METOPROLOL TARTRATE 10 MG: 5 INJECTION INTRAVENOUS at 02:30

## 2019-05-10 RX ADMIN — I.V. FAT EMULSION 100 ML: 20 EMULSION INTRAVENOUS at 19:21

## 2019-05-10 ASSESSMENT — PAIN SCALES - GENERAL
PAINLEVEL_OUTOF10: 10
PAINLEVEL_OUTOF10: 4
PAINLEVEL_OUTOF10: 0
PAINLEVEL_OUTOF10: 9
PAINLEVEL_OUTOF10: 0
PAINLEVEL_OUTOF10: 10
PAINLEVEL_OUTOF10: 5
PAINLEVEL_OUTOF10: 10

## 2019-05-10 ASSESSMENT — ENCOUNTER SYMPTOMS
CONSTIPATION: 0
BACK PAIN: 0
NAUSEA: 0
COUGH: 0
BLOOD IN STOOL: 0
DIARRHEA: 0
WHEEZING: 0
CHOKING: 0
SHORTNESS OF BREATH: 1
ABDOMINAL PAIN: 1
VOMITING: 0

## 2019-05-10 NOTE — FLOWSHEET NOTE
05/10/19 1508   Provider Notification   Reason for Communication Review case   Provider Name Dr. Hiren Block   Provider Notification Physician   Method of Communication Face to face   Response In department   Notification Time 1500   Nurse updated Dr. Hiren Block that patient levophed drip is at 10mcg/min. Dr. Hiren Block instructed nurse to call if patient reaches 14mcg/min with out reaching goal for MAP, as he will then start anther medication. Nurse updated him that we are still awaiting decisions from brother for further treatment/ surgery. Dr. Hiren Block wants patient to have another hgb draw now.

## 2019-05-10 NOTE — PROGRESS NOTES
Jud Zambrano and Paula Ambrose- patient's brother called back and updated nurse that they have decided to proceed with during surgery that Dr. Rosa Barrios wants to do. Nurse asked if Jeet Lipoma has any other siblings. Jud Cralupe states that Paula Ambrose is her only sibling. Nurse sent secure message to Dr. Chuyita Gonzalez him that patient's brother wants patient to have surgery. Nurse informed him that nurse spoke with risk management as requested and if physician feels patient is not of sound mind to make decisions for herself, and Paula Ambrose is only sibling, then he is next of kin.

## 2019-05-10 NOTE — PROGRESS NOTES
RN updated Dr. Louise Fontaine about patients respiratory status and being sent to ICU. No new orders received. ICU RN can call Dr. Louise Fontaine for further orders.

## 2019-05-10 NOTE — PROGRESS NOTES
General Surgery Progress Note            PATIENT NAME: Winston Martinez     TODAY'S DATE: 5/10/2019, 11:19 AM      SUBJECTIVE / OBJECTIVE:      VITALS:  BP (!) 96/46   Pulse 130   Temp 97.6 °F (36.4 °C) (Axillary)   Resp 27   Ht 5' (1.524 m)   Wt 102 lb 1.2 oz (46.3 kg)   SpO2 97%   BMI 19.93 kg/m²     Patient seen and examined  Transferred to ICU overnight. She was hypoxic. ? Aspiration  She is on pressor at this time  On BiPAP  Somewhat lethargic  Tachycardic  Lungs with diffuse rhonchi  Abdomen soft, mildly distended. Tender. Hypoactive BS.  NGT to LIS  No peripheral edema    INTAKE/OUTPUT:      Intake/Output Summary (Last 24 hours) at 5/10/2019 1119  Last data filed at 5/9/2019 1822  Gross per 24 hour   Intake 678 ml   Output --   Net 678 ml       CBC with Differential:    Lab Results   Component Value Date    WBC 28.5 05/10/2019    RBC 4.58 05/10/2019    RBC 3.66 05/23/2012    HGB 12.7 05/10/2019    HCT 39.9 05/10/2019     05/10/2019     05/23/2012    MCV 87.2 05/10/2019    MCH 27.8 05/10/2019    MCHC 31.9 05/10/2019    RDW 16.6 05/10/2019    METASPCT 2 05/07/2019    LYMPHOPCT 16 05/08/2019    MONOPCT 6 05/08/2019    MYELOPCT 1 05/07/2019    BASOPCT 0 05/08/2019    MONOSABS 0.73 05/08/2019    LYMPHSABS 1.94 05/08/2019    EOSABS 0.00 05/08/2019    BASOSABS 0.00 05/08/2019    DIFFTYPE NOT REPORTED 05/08/2019     BMP:    Lab Results   Component Value Date     05/10/2019    K 4.8 05/10/2019     05/10/2019    CO2 22 05/10/2019    BUN 36 05/10/2019    LABALBU 2.0 05/09/2019    LABALBU 4.6 05/17/2012    CREATININE 0.55 05/10/2019    CALCIUM 8.8 05/10/2019    GFRAA >60 05/10/2019    LABGLOM >60 05/10/2019    GLUCOSE 169 05/10/2019    GLUCOSE 87 05/21/2012         ASSESSMENT / PLAN:     Principal Problem:    GI bleed  Active Problems:    Sepsis due to urinary tract infection (HCC)    Cystitis    Emphysematous cystitis    Septic shock (HCC)    Leukemoid reaction    Aspiration pneumonia of right lower lobe due to vomit (HCC)    MRSA carrier    Urinary retention    Abdominal distention    Elevated CEA    Elevated CA 19-9 level    Cholecystitis    CRP elevated    Elevated procalcitonin    Bandemia    Centrilobular emphysema (HCC)    Rectal bleeding    Anemia    Small bowel obstruction (HCC)    Anxiety disorder    Noncompliance  Resolved Problems:    * No resolved hospital problems. *    SBO. Internal hernia   Respiratory failure  Patient has been resistant to surgical intervention and refusing treatments  I believe she needs surgical exploration to correct SBO  Will need risk management to intervene as patient is really unable to give consent at this time. She is lethargic and moaning and I'm not sure how much she understands   Above discussed with patient's brother and sister in law.  They stated that they will think about it and call us back  In the meantime I think it's appropriate to involve risk management in this case  Discussed with nurse        Patra Cooks, [address]

## 2019-05-10 NOTE — PROGRESS NOTES
Pharmacy Note  Vancomycin Consult    Lisandra Barboza is a 77 y.o. female started on Vancomycin for Klebsiella, ESBL gallbladder fluid; consult received from Dr. Anshul Melvin to manage therapy. Also receiving the following antibiotics: meropenem. Patient Active Problem List   Diagnosis    Paraproteinemia    CVA (cerebral vascular accident) ([facility] [address]    Abnormal computed tomography of cervical spine    Weight loss    Functional gait abnormality    Left knee pain    Knee pain, left    Acute pain of left knee    Sepsis due to urinary tract infection (HCC)    Cystitis    Emphysematous cystitis    Septic shock (HCC)    Leukemoid reaction    Aspiration pneumonia of right lower lobe due to vomit (HCC)    MRSA carrier    Urinary retention    Abdominal distention    Elevated CEA    Elevated CA 19-9 level    Cholecystitis    CRP elevated    Elevated procalcitonin    Bandemia    Centrilobular emphysema (HCC)    Rectal bleeding    GI bleed    Anemia    Small bowel obstruction (HCC)    Anxiety disorder    Noncompliance       Allergies:  Penicillins and Amoxicillin     Temp max: 37.5    Recent Labs     05/09/19  0528 05/10/19  0657   BUN 23 36*       Recent Labs     05/09/19  0528 05/10/19  0657   CREATININE <0.40* 0.55       Recent Labs     05/08/19  0515 05/10/19  0657   WBC 12.1* 28.5*         Intake/Output Summary (Last 24 hours) at 5/10/2019 1115  Last data filed at 5/9/2019 1822  Gross per 24 hour   Intake 678 ml   Output --   Net 678 ml       Culture Date      Source                       Results  See micro    Ht Readings from Last 1 Encounters:   05/01/19 5' (1.524 m)        Wt Readings from Last 1 Encounters:   05/09/19 102 lb 1.2 oz (46.3 kg)         Body mass index is 19.93 kg/m². Estimated Creatinine Clearance: 68 mL/min (based on SCr of 0.55 mg/dL). Goal Trough Level: 15-20 mcg/mL    Assessment/Plan:  Will initiate vancomycin 750 mg IV every 12 hours.   Timing of trough level will be determined based on culture results, renal function, and clinical response. Thank you for the consult. Will continue to follow. Judie Lee [facility].  [id number] 5/10/2019 11:18 AM

## 2019-05-10 NOTE — CARE COORDINATION
DISCHARGE PLANNING NOTE:    Discharge plan remains undetermined at this time. Annie Billjavier [address] working on appeal, however pt needs surgery to correct bowel obstruction. She has been resistant to surgery in the past, however is now no longer able to make decisions. Nurse waiting to find out from risk management if brother, Blaire Michaud, (emergency contact/next of kin) can make these decisions for patient. Has NG to [facility] and active order for IV Merrem and Vanco.    Will continue to follow for additional d/c needs.     Electronically signed by Valentino Rode, RN on 5/10/2019 at 1:18 PM

## 2019-05-10 NOTE — PROGRESS NOTES
Physical Therapy  DATE: 5/10/2019    NAME: Olena Carrel  MRN: [id number]   : 1950    Patient not seen this date for Physical Therapy due to:  [] Blood transfusion in progress  [] Hemodialysis  []  Patient Declined  [] Spine Precautions   [] Strict Bedrest  [] Surgery/ Procedure  [] Testing      [x] Other: Per RN Romina Gage, pt with low BP, elevated WBC, trouble breathing. (Rapid response called around 0100; pt transferred to ICU.)         [] PT being discontinued at this time. Patient independent. No further needs. [] PT being discontinued at this time as the patient has been transferred to palliative care. No further needs.     Shyann Grant, PTA

## 2019-05-10 NOTE — PROGRESS NOTES
Infectious disease Consult Note      Patient: Clark Ledesma  : 1950  Acct#:  [id number]     Date:  5/10/2019    Assessment:   New respiratory distress, sepsis ,likely related to aspiration. Shock. Anemia/GI bleed followed by GI  Ileus ,possible SBO   Cholecystitis status post cholecystectomy tube  grew Candida non-albicans and one colony of ESBL Klebsiella on culture . finished ABXS course      Ecoli Emphysematous cystitis initially treated     Aspiration pneumonia of right lower lobe initially treated    Yeast growth on sputum culture suspect contamination     MRSA carrier    Urinary retention     Anemia      PCN allergy               Recommendations:   Discontinue ceftriaxone and Flagyl. IV meropenem and vancomycin. Blood cultures  Urinalysis  Pro calcitonin level   Continue supportive care  Follow CBC . Subjective:       History of Present Illness  Patient is a 77 y.o.  female admitted with Sepsis due to urinary tract infection   who is seen in consult for the same . She presented w dysuria and lower abd pain for around a week associated with vomiting ,was found hypotensive with leukocytosis . CT suggested emphysematous cystitis,possible gastric outlet obstruction. CXR showed a right lower infiltrate. High CA-19-9,CEA  Allergy to [facility] w SOB   Urine culture  grew ESCHERICHIA COLI resistant to Ampicillin ,sensitive to all other tested ABXS . Blood cultures negative . Mycoplasma IGM 0.97   YEAST MODERATE GROWTH on sputum culture   S/P EGD   with reported esophagitis. GB US Findings suggesting acute cholecystitis. HIIDA showed absent gallbladder filling. She was extubated on   Status post cholecystectomy tube  by interventional radiology,  cultures on  grew Candida non-albicans and one colony of ESBL Klebsiella. PICC line was placed   Tagged RBC scan  was negative .   CT abdomen  showed no fluid collection ,Bowel obstruction which is suspected to be caused by an internal hernia. Interval history :  NG tube was placed under fluoroscopy yesterday. She had a small bowel follow-through yesterday, developed respiratory failure with hypoxia, tachycardia and tachypnea was transferred to ICU placed on BiPAP, blood pressure on the low side, Levophed will be started soon. WBC up to 28.5 today  Lactic acid normal  Progress is done and pending. She is  still on TPN      Past Medical History:   Diagnosis Date    Abnormal computed tomography of cervical spine     sclerotic bone appearance     CVA (cerebral vascular accident) ([address]     left  side weakness    GERD (gastroesophageal reflux disease)     Hypertension     Paraproteinemia     Weight loss       Past Surgical History:   Procedure Laterality Date    INTUBATION  4/14/2019         PACEMAKER PLACEMENT  07/2011    Pacemaker is Medtronic Revo (compatible). Leads placed in 1995 are NOT MRI compatible. Placed at [facility]. V's per Dr. Chadwick Peña can not have an MRI.  UPPER GASTROINTESTINAL ENDOSCOPY N/A 4/16/2019    EGD ESOPHAGOGASTRODUODENOSCOPY @ BEDSIDE  ICU 2002 performed by Savannah Crow MD at [facility]          Admission Meds  No current facility-administered medications on file prior to encounter. Current Outpatient Medications on File Prior to Encounter   Medication Sig Dispense Refill    oxyCODONE-acetaminophen (PERCOCET) 5-325 MG per tablet Take 1 tablet by mouth every 6 hours as needed for Pain.       senna-docusate (PERICOLACE) 8.6-50 MG per tablet Take 1 tablet by mouth 2 times daily      budesonide-formoterol (SYMBICORT) 160-4.5 MCG/ACT AERO Inhale 2 puffs into the lungs 2 times daily      sucralfate (CARAFATE) 1 GM/10ML suspension Take 1 g by mouth 4 times daily       traZODone (DESYREL) 50 MG tablet Take 50 mg by mouth nightly      tiZANidine (ZANAFLEX) 2 MG tablet Take 2 mg by mouth every 8 hours as needed (left knee)       aspirin 81 MG tablet Take 81 mg by mouth daily      PICC line in place       Data Review:    Recent Labs     05/08/19 0515 05/09/19  1244 05/09/19  1959 05/10/19  0657   WBC 12.1*  --   --   --  28.5*   HGB 7.2*   < > 11.7* 11.7* 12.7   HCT 22.4*   < > 36.3 37.1 39.9   MCV 88.5  --   --   --  87.2     --   --   --  449    < > = values in this interval not displayed. Recent Labs     05/08/19  0515 05/09/19  0528 05/10/19  0657    144 152*   K 3.8 4.2 4.8    111* 115*   CO2 23 23 22   PHOS 3.1  --  5.2*   BUN 19 23 36*   CREATININE <0.40* <0.40* 0.55     Recent Labs     05/08/19 0515 05/09/19 0528   AST 16 16   ALT 15 13   BILITOT 0.26* 0.64   ALKPHOS 178* 174*     No results for input(s): LIPASE, AMYLASE in the last 72 hours. Recent Labs     05/08/19  0515 05/09/19  0528 05/10/19  0657   PROTIME 16.1* 16.7* 16.8*   INR 1.3 1.3 1.4       Imaging Studies:                           All appropriate imaging studies and reports reviewed: Yes       Ct Abdomen Pelvis Wo Contrast Additional Contrast? Oral    Result Date: 4/14/2019  EXAMINATION: CT OF THE ABDOMEN AND PELVIS WITHOUT CONTRAST 4/14/2019 7:34 pm TECHNIQUE: CT of the abdomen and pelvis was performed without the administration of intravenous contrast. Multiplanar reformatted images are provided for review. Dose modulation, iterative reconstruction, and/or weight based adjustment of the mA/kV was utilized to reduce the radiation dose to as low as reasonably achievable. COMPARISON: 04/11/2019 HISTORY: ORDERING SYSTEM PROVIDED HISTORY: ABDOMINAL PAIN TECHNOLOGIST PROVIDED HISTORY: Water soluble contrast only please Ordering Physician Provided Reason for Exam: Abdominal pain - Vented patient. Contrast given via nurse through NG tube. Acuity: Unknown Type of Exam: Unknown Relevant Medical/Surgical History: Hx - Sepsis due to urinary tract infection. FINDINGS: Lower Chest: New moderate layering bilateral pleural effusions with bilateral lower lobe atelectasis.  Organs: Limited evaluation due lack of intravenous contrast.  Cholelithiasis redemonstrated. No gallbladder wall thickening or biliary ductal dilatation. Scattered tiny hypodense lesions in the liver are too small to characterize but statistically represent benign cysts or hemangiomas and appear unchanged. The pancreas, spleen, adrenal glands, and kidneys are unremarkable. There is no hydronephrosis or urinary tract calculus. GI/Bowel: The stomach is distended. Enteric tube is in place. No contrast is seen distal to the pylorus and there is contrast reflux into the distal esophagus. There is no evidence of bowel obstruction. The appendix is not definitely visualized. No focal pericecal inflammatory changes are evident. Pelvis: The urinary bladder is decompressed by Tran catheter. No pelvic mass is seen. Peritoneum/Retroperitoneum: Small amount of free fluid in the pelvic cavity. No free air or focal fluid collection. No abnormal lymph node. Normal abdominal aortic caliber. Moderate calcific atherosclerosis. Bones/Soft Tissues: No acute osseous abnormality. Diffuse anasarca. Moderate degenerative changes in the lumbar spine. 1. Distended, contrast filled stomach with reflux of contrast into the esophagus. No enteric contrast is seen distal to the pylorus suggesting delayed gastric emptying in the setting of ileus. 2. New moderate layering bilateral pleural effusions with bilateral lower lobe atelectasis. 3. Small amount of nonspecific free fluid in the pelvic cavity. 4. Anasarca. 5. Cholelithiasis.      Xr Chest (single View Frontal)    Result Date: 4/13/2019  EXAMINATION: SINGLE XRAY VIEW OF THE CHEST 4/13/2019 7:18 am COMPARISON: April 12, 2019 HISTORY: ORDERING SYSTEM PROVIDED HISTORY: dyspnea TECHNOLOGIST PROVIDED HISTORY: dyspnea Ordering Physician Provided Reason for Exam: dyspnea Acuity: Acute Type of Exam: Subsequent/Follow-up Additional signs and symptoms: dyspnea FINDINGS: A right IJ catheter is seen with its tip terminating at the superior cavoatrial junction. The left chest wall pacemaker and leads are stable. The cardiomediastinal silhouette is stable. There is interval increased opacity at the right lung base, may be related to atelectasis versus pneumonia. There is small atelectasis and mild pleural effusion at the left lung base. There is no pneumothorax. There is no acute osseous abnormality. Interval increased opacity at the right lung base, may be related to atelectasis versus pneumonia. Small atelectasis and mild pleural effusion at the left lung, new since the prior study. Xr Femur Left (min 2 Views)    Result Date: 3/27/2019  EXAMINATION: 4 XRAY VIEWS OF THE LEFT FEMUR; 2 XRAY VIEWS OF THE LEFT KNEE; 3 XRAY VIEWS OF THE LEFT TIBIA AND FIBULA 3/27/2019 7:00 pm COMPARISON: Left hip 11/14/2015. HISTORY: ORDERING SYSTEM PROVIDED HISTORY: pain TECHNOLOGIST PROVIDED HISTORY: pain Ordering Physician Provided Reason for Exam: pt twisted wrong, lt knee pain, unable to straighten knee. Acuity: Acute Type of Exam: Initial FINDINGS: Left femur, four views: The bones are diffusely osteopenic. No acute fracture deformity. The hip joint is maintained. The femoral head projects within the acetabulum. No focal soft tissue abnormality is seen. Left knee, two views: The knee is flexed. No acute osseous abnormality. No joint effusion. Joint spaces are not optimally profiled but no significant arthritic changes are apparent. Left tib-fib, three views: There is evidence of a remote healed distal tibia fracture. No acute fracture or dislocation is seen. No focal soft tissue abnormality. No acute osseous abnormality of the left femur. No acute osseous abnormality of the left knee. No acute osseous abnormality of the left tib-fib. Healed remote distal tibia fracture. Marked osteopenia, likely due to disuse.      Xr Knee Left (1-2 Views)    Result Date: 3/27/2019  EXAMINATION: 4 XRAY VIEWS OF THE LEFT FEMUR; 2 XRAY VIEWS OF THE LEFT KNEE; 3 XRAY VIEWS OF THE LEFT TIBIA AND FIBULA 3/27/2019 7:00 pm COMPARISON: Left hip 11/14/2015. HISTORY: ORDERING SYSTEM PROVIDED HISTORY: pain TECHNOLOGIST PROVIDED HISTORY: pain Ordering Physician Provided Reason for Exam: pt twisted wrong, lt knee pain, unable to straighten knee. Acuity: Acute Type of Exam: Initial FINDINGS: Left femur, four views: The bones are diffusely osteopenic. No acute fracture deformity. The hip joint is maintained. The femoral head projects within the acetabulum. No focal soft tissue abnormality is seen. Left knee, two views: The knee is flexed. No acute osseous abnormality. No joint effusion. Joint spaces are not optimally profiled but no significant arthritic changes are apparent. Left tib-fib, three views: There is evidence of a remote healed distal tibia fracture. No acute fracture or dislocation is seen. No focal soft tissue abnormality. No acute osseous abnormality of the left femur. No acute osseous abnormality of the left knee. No acute osseous abnormality of the left tib-fib. Healed remote distal tibia fracture. Marked osteopenia, likely due to disuse. Xr Knee Left (3 Views)    Result Date: 3/30/2019  EXAMINATION: 3 XRAY VIEWS OF THE LEFT KNEE 3/30/2019 10:58 am COMPARISON: March 27, 2018 HISTORY: ORDERING SYSTEM PROVIDED HISTORY: F/u L knee pain after cast TECHNOLOGIST PROVIDED HISTORY: AP, oblique, lateral F/u L knee pain after cast FINDINGS: Marked osteopenia. Interval casting, which degrades fine osseous detail question cortical offset in the lateral femoral metaphysis. No malalignment identified. No significant joint effusion. Interval casting. Question nondisplaced fracture in the lateral femoral metaphysis. Osteopenia.      Xr Tibia Fibula Left (2 Views)    Result Date: 3/27/2019  EXAMINATION: 4 XRAY VIEWS OF THE LEFT FEMUR; 2 XRAY VIEWS OF THE LEFT KNEE; 3 XRAY VIEWS OF THE LEFT TIBIA AND FIBULA 3/27/2019 7:00 pm COMPARISON: Left hip 11/14/2015. HISTORY: ORDERING SYSTEM PROVIDED HISTORY: pain TECHNOLOGIST PROVIDED HISTORY: pain Ordering Physician Provided Reason for Exam: pt twisted wrong, lt knee pain, unable to straighten knee. Acuity: Acute Type of Exam: Initial FINDINGS: Left femur, four views: The bones are diffusely osteopenic. No acute fracture deformity. The hip joint is maintained. The femoral head projects within the acetabulum. No focal soft tissue abnormality is seen. Left knee, two views: The knee is flexed. No acute osseous abnormality. No joint effusion. Joint spaces are not optimally profiled but no significant arthritic changes are apparent. Left tib-fib, three views: There is evidence of a remote healed distal tibia fracture. No acute fracture or dislocation is seen. No focal soft tissue abnormality. No acute osseous abnormality of the left femur. No acute osseous abnormality of the left knee. No acute osseous abnormality of the left tib-fib. Healed remote distal tibia fracture. Marked osteopenia, likely due to disuse. Xr Abdomen (kub) (single Ap View)    Result Date: 4/14/2019  EXAMINATION: SINGLE SUPINE XRAY VIEW(S) OF THE ABDOMEN 4/14/2019 7:39 am COMPARISON: CT abdomen and pelvis  film from 11 April 2019 HISTORY: [address]: Abd Distention TECHNOLOGIST PROVIDED HISTORY: Abd Distention Ordering Physician Provided Reason for Exam: Abdominal distention. Pt was moving around in the bed. Best films at present time. Acuity: Acute Type of Exam: Initial Additional signs and symptoms: Abdominal distention. Pt was moving around in the bed. Best films at present time. FINDINGS: Portable view time stamped at 748 hours demonstrates an intestinal tube terminating in the midportion of a gaseous Spike dilated stomach. Densities are present over the stomach likely medication. Bipolar pacemaker is in situ with intact leads. Heart size is top-normal, stable.   Gaseous distension of the stomach and loop of bowel in the upper mid abdomen is noted but there is gas and fecal material in the rectum. Gastric outlet obstruction or proximal small bowel partial obstruction is suspected. No free air is noted. Midline city is present over the pelvis likely a monitor or CT small bore catheter. Vascular calcification is present in the pelvis. Persistent preferential gaseous distension of the stomach although there is some gas in the upper abdomen and gas and fecal material present in the rectosigmoid. Findings suggest gastric outlet obstruction. Ct Abdomen Pelvis W Iv Contrast    Result Date: 4/11/2019  EXAMINATION: CT OF THE ABDOMEN AND PELVIS WITH CONTRAST 4/11/2019 5:14 pm TECHNIQUE: CT of the abdomen and pelvis was performed with the administration of intravenous contrast. Multiplanar reformatted images are provided for review. Dose modulation, iterative reconstruction, and/or weight based adjustment of the mA/kV was utilized to reduce the radiation dose to as low as reasonably achievable. COMPARISON: None. HISTORY: ORDERING SYSTEM PROVIDED HISTORY: Abdominal pain TECHNOLOGIST PROVIDED HISTORY: IV Only Contrast Ordering Physician Provided Reason for Exam: patient c/o abd pain for an hour FINDINGS: Lower Chest: Trace pleural fluid bilaterally is noted. Indwelling cardiac pacemaker is present. Heart size is normal.  Coronary artery calcifications are evident. The esophagus is significantly dilated and fluid-filled. No paraesophageal adenopathy is evident. Organs: The spleen, pancreas, and adrenals are unremarkable. The liver contains hypodensities, likely cysts. The gallbladder is distended and contains a solitary gallstone. The kidneys excrete contrast bilaterally. Extrarenal collecting systems are noted. The ureters are mildly dilated down to the urinary bladder without evidence of intraluminal or ureteral obstructing calculi.  GI/Bowel: Marked distention of the stomach FINDINGS: Portable AP upright view of the chest. Endotracheal tube distal tip overlying the mid trachea approximately 4.1 cm above the level of the ron. Enteric tube traverses the GE junction with distal tip excluded from the field of view. Left subclavian approach cardiac pacemaker device distal lead tips relatively stable in position. Right internal jugular approach central venous catheter distal tip overlying the high right atrium, stable. Cardiac monitor leads overlie the chest. Atherosclerotic calcification of the thoracic aorta. Slight stable volume loss of the left hemithorax. No pneumothorax. No free air. Dense retrocardiac/left basilar airspace consolidation and small left-sided pleural effusion. Stable mild focal opacity at the right mid lung zone. Underlying COPD. Stable mild pulmonary vascular congestion and left-sided predominant parahilar opacity. Visualized osseous structures remain unchanged. 1. Stable multifocal airspace disease as detailed above with dense retrocardiac/left basilar airspace consolidation and small left-sided pleural effusion. Mild pulmonary vascular congestion. Findings may represent edema or multifocal pneumonia. 2. Underlying COPD. 3. Tubes and line as detailed above. Xr Chest Portable    Result Date: 4/15/2019  EXAMINATION: SINGLE XRAY VIEW OF THE CHEST 4/15/2019 6:47 am COMPARISON: 14 April 2019 HISTORY: ORDERING SYSTEM PROVIDED HISTORY: ETT placement TECHNOLOGIST PROVIDED HISTORY: ETT placement Ordering Physician Provided Reason for Exam: on vent Acuity: Acute Type of Exam: Initial FINDINGS: AP portable view of the chest time stamped at 612 hours demonstrates overlying cardiac monitoring electrodes. Endotracheal tube terminates 4 cm above the ron. Bipolar pacemaker enters from the left with intact leads in appropriate positions. Intestinal tube extends beyond the fundus of the stomach, tip not included.   Right internal jugular catheter terminates at fold.  No pleural effusion or pneumothorax. Stable cardiomediastinal silhouette and great vessels with redemonstration of atherosclerotic thoracic aorta. New right IJ central venous catheter with tip near the superior atrial caval junction. No pneumothorax. No new consolidation. Thank you for allowing me to participate in the care of your patient. Please feel free to contact me with any questions or concerns.      Pipo Nicholson MD

## 2019-05-10 NOTE — PROGRESS NOTES
[facility]    Gastroenterology Daily Progress Note      Patient:   Winston Martinez   :    1950   Facility:   [address] [place] [address]  Date:     5/10/2019  Consultant:   Stephy Dowling CNP      SUBJECTIVE  77 y.o. female admitted 2019 with Sepsis due to urinary tract infection ([facility] [address] [A41.9, N39.0]  Cystitis [N30.90]  Cystitis [N30.90] and seen for bright red blood per rectum and SBO. The pt was seen and examined. She was transferred to the ICU last night for respiratory distress and hypotension. She is now on bipap and will moan but not answer questions. WBC of 28.5. Her NG tube has been draining bile colored fluid. Her abdominal CT was not completed yet. SBFT showed a significant delay in the emptying of the stomach and contrast extending into the bowel. She had a normal appearing bowel movement yesterday, no overt bleeding overnight.          OBJECTIVE  Scheduled Meds:   alteplase  1 mg Intracatheter Once    levalbuterol  1.25 mg Nebulization Q4H    ipratropium  0.5 mg Nebulization Q4H    heparin (porcine)  5,000 Units Subcutaneous BID    meropenem  1 g Intravenous Q8H    vancomycin  750 mg Intravenous Q12H    vancomycin (VANCOCIN) intermittent dosing (placeholder)   Other RX Placeholder    oxymetazoline  2 spray Each Nare BID    mometasone-formoterol  2 puff Inhalation BID    pantoprazole  40 mg Oral QAM AC    metoprolol tartrate  12.5 mg Oral BID    fat emulsion  100 mL Intravenous Daily    magnesium sulfate  1 g Intravenous Once    insulin lispro  0-12 Units Subcutaneous Q6H    venlafaxine  37.5 mg Oral TID    aspirin  81 mg Oral Daily    sodium chloride flush  10 mL Intravenous 2 times per day       Vital Signs:  BP (!) 96/46   Pulse 130   Temp 97.6 °F (36.4 °C) (Axillary)   Resp 26   Ht 5' (1.524 m)   Wt 102 lb 1.2 oz (46.3 kg)   SpO2 99%   BMI 19.93 kg/m²      Physical Exam:   General appearance: ill appearing sedated on bipap NAD  Lungs: CTA/diminished bilaterally    Heart: S1S2 RRR tachycardic and hypotensive  Abdomen: Soft, Nontender, Not distended, BS x4 hypoactive NG with bile colored drainage, cholecystostomy tube with bile drainage  Skin: No jaundice, No clubbing, No cyanosis    Lab and Imaging Review     CBC  Recent Labs     05/08/19  0515  05/09/19  1244 05/09/19  1959 05/10/19  0657   WBC 12.1*  --   --   --  28.5*   HGB 7.2*   < > 11.7* 11.7* 12.7   HCT 22.4*   < > 36.3 37.1 39.9   MCV 88.5  --   --   --  87.2     --   --   --  449    < > = values in this interval not displayed.        BMP  Recent Labs     05/08/19 0515 05/09/19  0528 05/10/19  0657    144 152*   K 3.8 4.2 4.8    111* 115*   CO2 23 23 22   BUN 19 23 36*   CREATININE <0.40* <0.40* 0.55   GLUCOSE 120* 120* 169*   CALCIUM 7.3* 8.0* 8.8       LFTS  Recent Labs     05/08/19 0515 05/09/19  0528   ALKPHOS 178* 174*   ALT 15 13   AST 16 16   PROT 4.9* 5.3*   BILITOT 0.26* 0.64   LABALBU 1.9* 2.0*       PT/INR  Recent Labs     05/08/19 0515 05/09/19  0528 05/10/19  0657   PROTIME 16.1* 16.7* 16.8*   INR 1.3 1.3 1.4     FINDINGS:sbft 5/9/19    image demonstrates NG tube overlying the left side of the abdomen   within the stomach.  Surgical drain overlies the right side of the abdomen.       There is a nonspecific bowel gas pattern with mild dilated loops of bowel in   lower abdomen.       Initial images demonstrate filling of the stomach.  At 1 hour and 45 minutes   no significant contrast is noted in the small bowel.       The 4-1/2 hour image demonstrates contrast within loops of bowel within the   mid and lower abdomen and pelvis.       There appears to be contrast extending to the colon in the right side of the   abdomen.  Contrast is noted within the pelvis which may be within the bladder   or rectum.           Impression   Significant delay in emptying of the stomach with persistent contrast noted   at the 6 hour concha.       There is contrast extending into the bowel which appears to be in the colon. Subtle findings are limited.       Consider follow-up radiograph of the abdomen in 6-8 hours.         TECHNIQUE:4/22/19   Informed consent was obtained after a detailed explanation of the procedure   including risks, benefits, and alternatives.  Universal protocol was   followed. A suitable skin site was prepped and draped in sterile fashion   following ultrasound localization. An 18 gauge needle was advanced under   ultrasound guidance into the gallbladder via transhepatic route and a 0.035   guidewire was used to place a 8 [place] cholecystostomy tube under   fluoroscopic guidance.  The catheter was sutured to the skin and the patient   tolerated the procedure well.  Thick bilious material was sent for laboratory   analysis.  The catheter was attached to gravity drainage.       FINDINGS:   Ultrasound image demonstrates distended gallbladder.  Bilious fluid was sent   for culture.           Impression   Successful placement of transhepatic 8 [place] percutaneous cholecystostomy   tube.      Results for Leticia King (MRN [id number]) as of 4/20/2019 09:15    Ref. Range 4/15/2019 11:10    Latest Ref Range: <38 U/mL 62 (H)   CA 19-9 Latest Ref Range: 0 - 35 U/mL 49 (H)   CEA Latest Ref Range: <3.9 ng/mL 5.6 (H)    Results for Leticia King (MRN [id number]) as of 5/4/2019 12:26    Ref.  Range 4/15/2019 11:10   AFP (Alpha Fetoprotein) Latest Ref Range: <8.4 ug/L 1.8      FINDINGS:hida 4/20/19   Prompt, homogenous uptake by the liver is noted with normal appearance of   radiotracer excretion into the biliary system.  Clearance of bloodpool   activity appears appropriate.       Normal small bowel activity is seen.  Prominent activity within the common   bile duct.  The gallbladder is not visualized by the end of the exam.           Impression   Absent filling of the gallbladder consistent with acute cholecystitis.         FINDINGS:ct abd 4/14/19   Lower Chest: New moderate normal at 7 mm.           Impression   Findings suggesting acute cholecystitis.      ESOPHAGOGASTRODUODENOSCOPY   ( EGD )  DATE OF PROCEDURE: 4/16/2019      Berton Kayser, MD        POSTOPERATIVE Aniyah Loss has esophagitis.     Has a large amount of retained solid food in the upper part of the stomach and fundus.  Antrum is clear.  Pylorus wide open and did not show signs of stenosis.     OPERATION: Upper GI endoscopy          Findings:     Retropharyngeal area was grossly normal appearing     Esophagus: abnormal: Has a grade 2 esophagitis.  Appears peptic esophagitis.  Has a small sliding hiatal hernia.     Stomach:  Fundus of the stomach and body of the stomach.  With solid food.  75% of the lumen is occupied with solid food.     Antrum: No retained food.  Able to advance the scope through the pylorus without difficulty.  No pyloric stenosis seen.  No signs of outlet obstruction.     Duodenum:     Descending: normal    Bulb: normal  Minimal liquid present in the 2nd part of the duodenum.     FINDINGS:5/3/19 NM bleeding scan   Activity is seen within the blood pool, including the great vessels, heart,   liver, and spleen.  There was no abnormal accumulation of radioactivity in   the abdomen to suggest active bleeding during study acquisition.           Impression   No evidence of active GI bleeding during acquisition.          FINDINGS:ct abd 5/5/19   Evaluation is limited by motion.       Lower Chest: Moderate bilateral pleural effusions.       Organs: Multiple liver hypodensities measuring up to 4 mm are incompletely   characterized but in the absence of risk factors likely represent cysts   requiring no specific follow-up.  Cholecystostomy tube is in place.  No   adjacent focal drainable fluid collection.  No pancreatic lesion.  No   splenomegaly.  No adrenal lesion.  No hydronephrosis.  No renal lesion.       GI/Bowel: Stomach is markedly distended.  Swirled appearance of the mesentery is seen within the mid to lower abdomen with adjacent thickened loops of   small bowel.       Peritoneum/Retroperitoneum: Atherosclerotic calcification of the abdominal   aorta without aneurysmal dilatation.  No adenopathy.       Bones/Soft Tissues: No acute soft tissue abnormality. Diffuse osteopenia. Lumbar spine degenerative changes.           Impression   Bowel obstruction which is suspected to be caused by an internal hernia.       Cholecystostomy tube is in place.  No surrounding fluid collection.             ASSESSMENT/PLAN:  1. Anemia with no current overt bleeding  -hgb 12.7 continue to trend  -was refusing the flex sig; now on bipap    2. SBO  -ct ordered for re evaluation  -SBFT showed delayed stomach emptying  -continue the TPN and NG to LIWS      3. Cholecystitis s/p cholecystostomy tube placement 4/22/19 now with leucocytosis, alk phos remains elevated   -continue to trend  -antibiotics per ID          This plan was formulated in collaboration with  . Electronically signed by: AGAPITO Nj CNP on 5/10/2019 at 10:25 AM     Attending Physician Statement  I have discussed the care of Aki Woodsankita and   I have examined the patient myselft and taken ros and hpi , including pertinent history and exam findings,  with the author of this note . I have reviewed the key elements of all parts of the encounter with the nurse practitioner/resident.     I agree with the assessment, plan and orders as documented by the above health care provider       Electronically signed by Jessie Giron MD

## 2019-05-10 NOTE — FLOWSHEET NOTE
05/10/19 8210   Family/Significant Other Communication   Family/Significant Other Update Called   Nurse called patient's emergency contact Zack Chaidez. Ed's wife Bobby Jay answered phone and states that he is still sleeping as they live in   [place] and there is a time difference. Bobby Jay also states that her  has verbal impairments d/t previous stroke and states that she helps communicate. Nurse attempted to call risk management to see if it okay to speak with patient's emergency contact's wife, Bobby Jay, since only Zack Chaidez is listed,and patient in unable to give consent for nurse to speak with her at this time. Nurse spoke with ICU nurse supervisor who states that is okay to speak with patient's emergency contact's wife, Bobby Jay, and that is how we are communicating information. Bobby Jay states she is aware patient is in ICU and is on the other line with social work. Nurse left her name/number and informed her that nurse wanted to give update on condition. Bobby Jay states that her and her  will call nurse back when Zack Chaidez wakes up.

## 2019-05-10 NOTE — PROGRESS NOTES
[address] [facility].    PROGRESS NOTE             5/10/2019    7:24 AM    Name:   Erin Damon  MRN:     [id number]     Acct:      [de-identified]   Room:   2006/2006-01  IP Day:  34  Admit Date:  4/11/2019  2:48 PM    PCP:  Lucrecia Gomez DO  Code Status:  Limited    Subjective:     C/C:   Chief Complaint   Patient presents with    Abdominal Pain     Interval History Status: not changed. Pt transferred to ICU    Pt seen and examined bedside, on BIPAP. Not responding to questions or requests. Pt was found to be saturating in the low 60s on 4L NCand lethargic, transferred to ICU. Yesterday SBFT was preformed that showed significant delay in emptying. Pt has been tachycardic, HR 130s, with hypotension. Pt is on TPN, sedated on precedex. Labs ordered, ID started Abx, per Pulm to start Levophed. Brief History:     Erin Damon is a 77 y.o. female who was admitted for the management of   GI bleed , presented to ER with Abdominal Pain     In the ED: pt was septic, Ucx showed e.coli. CT showed emphysematous cystitis , bilateral hydronephrosis 2/2 obstruction and MRSA pneumonia. Pt had cast on left LE placed at [facility] the week prior.      On admission: in ICU. Urology consulted - dyer placed. Pulm, surgery, GI and ID consulted. Pressors and abx administered. She required intubation due to respiratory distress, NGT placed, pressors administered. MRSA positive - vanc started, on levaquin. TPN per GI. Pt found to have ileus. EGD performed - no obvious gastric outlet obstruction. Candida found on sputum cx. Diflucan started. HIDA scan performed - suggestive of cholecystits. Pt not stable for cholecystectomy. Vanc dced. Levaquin and Flagyl cont. Pt extubated. Pt in heart failure - amiodarone adminsitered. Cholecystostomy tube placed. Ileus was resolving. Levaquin dced. On Flagyl and aztreonam. Amio switched to cardizem. Pressors weaned off.  Abx switched to oral.  Transferred to step down. Fresh frozen plasma transfused. Pt symptoms improved. She then had GI bleed per rectum - NM gi scan was neg for active bleed. Received blood transfusion. Hb improved. CT abdo done - ileus. Pt refusing further surgical/medical intervention. Cast removed. US doppler LE - negative. Patient status changed to limited - no intubation. Ir placed NG, SBFT attempted. Pt decompensated and transferred back to ICU. Review of Systems:     Review of Systems   Constitutional: Positive for fatigue. Negative for chills, diaphoresis and fever. Respiratory: Positive for shortness of breath. Negative for cough, choking and wheezing. Cardiovascular: Negative for chest pain, palpitations and leg swelling. Gastrointestinal: Positive for abdominal pain. Negative for blood in stool, constipation, diarrhea, nausea and vomiting. Genitourinary: Negative for dysuria, frequency and pelvic pain. Musculoskeletal: Negative for back pain, myalgias and neck pain. Neurological: Negative for dizziness, speech difficulty, light-headedness and headaches. Psychiatric/Behavioral: Negative for agitation, confusion and suicidal ideas. The patient is not nervous/anxious. All other systems reviewed and are negative. Medications: Allergies:     Allergies   Allergen Reactions    Penicillins Shortness Of Breath and Rash    Amoxicillin        Current Meds:   Scheduled Meds:    cefTRIAXone (ROCEPHIN) IV  1 g Intravenous Q24H    metroNIDAZOLE  500 mg Intravenous Q8H    alteplase  1 mg Intracatheter Once    oxymetazoline  2 spray Each Nare BID    ipratropium  0.5 mg Nebulization 4x daily    levalbuterol  1.25 mg Nebulization 4x Daily    sodium chloride  250 mL Intravenous Once    sodium chloride  250 mL Intravenous Once    predniSONE  10 mg Oral Daily    mometasone-formoterol  2 puff Inhalation BID    pantoprazole  40 mg Oral QAM AC    metoprolol tartrate  12.5 mg Oral BID    fat emulsion  100 mL Intravenous Daily    furosemide  40 mg Intravenous Daily    magnesium sulfate  1 g Intravenous Once    insulin lispro  0-12 Units Subcutaneous Q6H    venlafaxine  37.5 mg Oral TID    aspirin  81 mg Oral Daily    sodium chloride flush  10 mL Intravenous 2 times per day     Continuous Infusions:    dexmedetomidine (PRECEDEX) IV infusion 0.3 mcg/kg/hr (05/10/19 0659)    PN-Adult 2-in-1 Central Line (Standard) 65 mL/hr at 19 1814    dextrose       PRN Meds: diatrizoate meglumine-sodium, sodium chloride, sodium chloride flush, morphine, LORazepam, ondansetron, zolpidem, sodium chloride flush, hydrOXYzine, metoprolol, sodium chloride nebulizer, fentanNYL, oxyCODONE-acetaminophen, magnesium sulfate, sodium phosphate IVPB **OR** sodium phosphate IVPB, potassium chloride **OR** potassium alternative oral replacement **OR** potassium chloride, glucose, dextrose, glucagon (rDNA), dextrose, milk and molasses, sodium chloride flush, acetaminophen    Data:     Past Medical History:   has a past medical history of Abnormal computed tomography of cervical spine, CVA (cerebral vascular accident) ([address] GERD (gastroesophageal reflux disease), Hypertension, Paraproteinemia, and Weight loss. Social History:   reports that she has been smoking. She has a 30.00 pack-year smoking history. She has never used smokeless tobacco. She reports that she drank about 16.8 oz of alcohol per week. She reports that she does not use drugs. Family History: History reviewed. No pertinent family history. Vitals:  /67   Pulse 133   Temp 98.7 °F (37.1 °C) (Axillary)   Resp 25   Ht 5' (1.524 m)   Wt 102 lb 1.2 oz (46.3 kg)   SpO2 97%   BMI 19.93 kg/m²   Temp (24hrs), Av.4 °F (36.9 °C), Min:97.5 °F (36.4 °C), Max:99.6 °F (37.6 °C)    Recent Labs     19  0556 19  1207 19  1811 05/10/19  0118   POCGLU 119* 129* 103 180*       I/O(24Hr):     Intake/Output Summary (Last 24 hours) at 5/10/2019 0724  Last data filed at 5/9/2019 1822  Gross per 24 hour   Intake 1628 ml   Output --   Net 1628 ml       Labs:    Lab Results   Component Value Date    WBC 12.1 (H) 05/08/2019    HGB 11.7 (L) 05/09/2019    HCT 37.1 05/09/2019    MCV 88.5 05/08/2019     05/08/2019     Lab Results   Component Value Date     05/09/2019    K 4.2 05/09/2019     (H) 05/09/2019    CO2 23 05/09/2019    BUN 23 05/09/2019    CREATININE <0.40 (L) 05/09/2019    GLUCOSE 120 (H) 05/09/2019    CALCIUM 8.0 (L) 05/09/2019    PROT 5.3 (L) 05/09/2019    LABALBU 2.0 (L) 05/09/2019    BILITOT 0.64 05/09/2019    ALKPHOS 174 (H) 05/09/2019    AST 16 05/09/2019    ALT 13 05/09/2019    LABGLOM CANNOT BE CALCULATED 05/09/2019    GFRAA CANNOT BE CALCULATED 05/09/2019    GLOB NOT REPORTED 04/18/2019           Lab Results   Component Value Date/Time    SPECIAL NOT REPORTED 04/23/2019 10:10 PM     Lab Results   Component Value Date/Time    CULTURE (A) 04/22/2019 04:59 PM     YEAST, NOT CANDIDA ALBICANS OR CANDIDA DUBLINIENSIS SCANT GROWTH    CULTURE (A) 04/22/2019 04:59 PM     KLEBSIELLA PNEUMONIAE ONE COLONY FORMING UNIT THIS ORGANISM IS AN EXTENDED-SPECTRUM BETA-LACTAMASE  AND RESISTANCE TO THERAPY WITH PENICILLINS, CEPHALOSPORINS AND AZTREONAM IS EXPECTED. THESE ORGANISMS GENERALLY REMAIN SUSCEPTIBLE TO CARBAPENEMS. CONSIDER ID CONSULTATION. CULTURE NO ANAEROBIC ORGANISMS ISOLATED AT 5 DAYS (A) 04/22/2019 04:59 PM       [facility]    Radiology:    Ct Abdomen Pelvis Wo Contrast Additional Contrast? Oral    Result Date: 4/14/2019  EXAMINATION: CT OF THE ABDOMEN AND PELVIS WITHOUT CONTRAST 4/14/2019 7:34 pm TECHNIQUE: CT of the abdomen and pelvis was performed without the administration of intravenous contrast. Multiplanar reformatted images are provided for review.  Dose modulation, iterative reconstruction, and/or weight based adjustment of the mA/kV was utilized to reduce the radiation dose to as low as reasonably atelectasis. 3. Small amount of nonspecific free fluid in the pelvic cavity. 4. Anasarca. 5. Cholelithiasis. Xr Chest (single View Frontal)    Result Date: 5/2/2019  EXAMINATION: SINGLE XRAY VIEW OF THE CHEST 5/2/2019 6:34 am COMPARISON: 29 April 2019 HISTORY: ORDERING SYSTEM PROVIDED HISTORY: COPD TECHNOLOGIST PROVIDED HISTORY: COPD Ordering Physician Provided Reason for Exam: COPD Acuity: Acute Type of Exam: Initial FINDINGS: AP portable view of the chest time stamped at 630 hours demonstrates stable cardiac size. Overlying cardiac monitoring electrodes are present. Right-sided PICC line terminates in the right atrium. Bipolar pacemaker enters from the left with intact leads in appropriate positions. There is been no significant interval change in bilateral interstitial opacities, representing interval change since 2015. This may be related to worsening interstitial disease or superimposed venous congestion. Bilateral effusions are noted with bibasilar opacities, either atelectasis or edema. Continued bilateral effusions, bibasilar opacities and bilateral interstitial opacities in the upper lobes. Findings may be related to worsening interstitial disease and edema. Airspace disease is not excluded. Xr Chest (single View Frontal)    Result Date: 4/13/2019  EXAMINATION: SINGLE XRAY VIEW OF THE CHEST 4/13/2019 7:18 am COMPARISON: April 12, 2019 HISTORY: ORDERING SYSTEM PROVIDED HISTORY: dyspnea TECHNOLOGIST PROVIDED HISTORY: dyspnea Ordering Physician Provided Reason for Exam: dyspnea Acuity: Acute Type of Exam: Subsequent/Follow-up Additional signs and symptoms: dyspnea FINDINGS: A right IJ catheter is seen with its tip terminating at the superior cavoatrial junction. The left chest wall pacemaker and leads are stable. The cardiomediastinal silhouette is stable. There is interval increased opacity at the right lung base, may be related to atelectasis versus pneumonia.   There is small atelectasis and mild pleural effusion at the left lung base. There is no pneumothorax. There is no acute osseous abnormality. Interval increased opacity at the right lung base, may be related to atelectasis versus pneumonia. Small atelectasis and mild pleural effusion at the left lung, new since the prior study. Xr Chest Standard (2 Vw)    Result Date: 4/29/2019  EXAMINATION: TWO VIEWS OF THE CHEST 4/29/2019 8:04 pm COMPARISON: 04/27/2019 HISTORY: ORDERING SYSTEM PROVIDED HISTORY: Follow-up lung infiltrate TECHNOLOGIST PROVIDED HISTORY: Follow-up lung infiltrate Ordering Physician Provided Reason for Exam: Follow-up infiltrate. Pt unable to lean forward - unable to get proper sponge behind pt for lateral. Pt unable to raise left arm at all for lateral. Best possible lateral obtained. Acuity: Unknown Type of Exam: Unknown Additional signs and symptoms: Follow-up infiltrate. Pt unable to lean forward - unable to get proper sponge behind pt for lateral. Pt unable to raise left arm at all for lateral. Best possible lateral obtained. FINDINGS: Left chest cardiac pacemaker device is in place. Right IJ central venous catheter in place with distal tip at the cavoatrial junction. Heart size is within normal limits. No vascular congestion. There are small to moderate pleural effusions. There are interstitial opacities in the upper lungs, which could be related to scarring and/or developing infiltrate, slightly improved in appearance when compared to 04/27/2019. No evidence of pneumothorax. 1.  Small to moderate bilateral pleural effusions, better visualized on lateral view. 2.  Upper lobe interstitial opacities, slightly improved when compared to the previous study, possibly related to underlying fibrotic change or residual infiltrate.      Xr Abdomen (kub) (single Ap View)    Result Date: 4/19/2019  EXAMINATION: SINGLE SUPINE XRAY VIEW(S) OF THE ABDOMEN 4/19/2019 9:48 am COMPARISON: April 14, 2019 HISTORY: ORDERING SYSTEM PROVIDED HISTORY: pain TECHNOLOGIST PROVIDED HISTORY: pain Ordering Physician Provided Reason for Exam: Abdominal pain and distentsion Acuity: Acute Type of Exam: Initial Additional signs and symptoms: Abdominal pain and distentsion FINDINGS: Enteric tube with the tip within the stomach. Small left pleural effusion. Minimal right pleural effusion. Mildly dilated loops of bowel. Nonspecific bowel gas pattern with mildly dilated loops of bowel. Xr Abdomen (kub) (single Ap View)    Result Date: 4/14/2019  EXAMINATION: SINGLE SUPINE XRAY VIEW(S) OF THE ABDOMEN 4/14/2019 7:39 am COMPARISON: CT abdomen and pelvis  film from 11 April 2019 HISTORY: [address] [facility] [address]: Abd Distention TECHNOLOGIST PROVIDED HISTORY: Abd Distention Ordering Physician Provided Reason for Exam: Abdominal distention. Pt was moving around in the bed. Best films at present time. Acuity: Acute Type of Exam: Initial Additional signs and symptoms: Abdominal distention. Pt was moving around in the bed. Best films at present time. FINDINGS: Portable view time stamped at 748 hours demonstrates an intestinal tube terminating in the midportion of a gaseous Spike dilated stomach. Densities are present over the stomach likely medication. Bipolar pacemaker is in situ with intact leads. Heart size is top-normal, stable. Gaseous distension of the stomach and loop of bowel in the upper mid abdomen is noted but there is gas and fecal material in the rectum. Gastric outlet obstruction or proximal small bowel partial obstruction is suspected. No free air is noted. Midline city is present over the pelvis likely a monitor or CT small bore catheter. Vascular calcification is present in the pelvis. Persistent preferential gaseous distension of the stomach although there is some gas in the upper abdomen and gas and fecal material present in the rectosigmoid. Findings suggest gastric outlet obstruction.      Ct Abdomen W Contrast Additional Contrast? Radiologist Recommendation    Result Date: 5/5/2019  EXAMINATION: CT ABDOMEN WITH CONTRAST, 5/5/2019 3:38 pm TECHNIQUE: CT of the abdomen was performed with the administration of intravenous contrast. Multiplanar reformatted images are provided for review. Dose modulation, iterative reconstruction, and/or weight based adjustment of the mA/kV was utilized to reduce the radiation dose to as low as reasonably achievable. COMPARISON: April 14, 2019 HISTORY: ORDERING SYSTEM PROVIDED HISTORY: Check for abscess/fluid collection around ashley tube site. TECHNOLOGIST PROVIDED HISTORY: Ordering Physician Provided Reason for Exam: Patient c/o abd pain around her ashley site and drainage tube. FINDINGS: Evaluation is limited by motion. Lower Chest: Moderate bilateral pleural effusions. Organs: Multiple liver hypodensities measuring up to 4 mm are incompletely characterized but in the absence of risk factors likely represent cysts requiring no specific follow-up. Cholecystostomy tube is in place. No adjacent focal drainable fluid collection. No pancreatic lesion. No splenomegaly. No adrenal lesion. No hydronephrosis. No renal lesion. GI/Bowel: Stomach is markedly distended. Swirled appearance of the mesentery is seen within the mid to lower abdomen with adjacent thickened loops of small bowel. Peritoneum/Retroperitoneum: Atherosclerotic calcification of the abdominal aorta without aneurysmal dilatation. No adenopathy. Bones/Soft Tissues: No acute soft tissue abnormality. Diffuse osteopenia. Lumbar spine degenerative changes. Bowel obstruction which is suspected to be caused by an internal hernia. Cholecystostomy tube is in place. No surrounding fluid collection.      Nm Gi Blood Loss    Result Date: 5/3/2019  EXAMINATION: NUCLEAR MEDICINE GASTRIC BLEEDING STUDY 5/3/2019 TECHNIQUE: Following the intravenous injection of 23.8 mCi of 99 mTc-labeled RBC's, a flow study and standard images of the abdomen was obtained over a total period of 60 minutes COMPARISON: None. HISTORY: ORDERING SYSTEM PROVIDED HISTORY: GI BLEEDING TECHNOLOGIST PROVIDED HISTORY: Ordering Physician Provided Reason for Exam: GI bleeding Acuity: Unknown Type of Exam: Unknown FINDINGS: Activity is seen within the blood pool, including the great vessels, heart, liver, and spleen. There was no abnormal accumulation of radioactivity in the abdomen to suggest active bleeding during study acquisition. No evidence of active GI bleeding during acquisition. RECOMMENDATIONS: If the patient shows hemodynamic signs of an active bleed in the next 20 hours, additional images can be acquired. Ct Abdomen Pelvis W Iv Contrast    Result Date: 4/11/2019  EXAMINATION: CT OF THE ABDOMEN AND PELVIS WITH CONTRAST 4/11/2019 5:14 pm TECHNIQUE: CT of the abdomen and pelvis was performed with the administration of intravenous contrast. Multiplanar reformatted images are provided for review. Dose modulation, iterative reconstruction, and/or weight based adjustment of the mA/kV was utilized to reduce the radiation dose to as low as reasonably achievable. COMPARISON: None. HISTORY: ORDERING SYSTEM PROVIDED HISTORY: Abdominal pain TECHNOLOGIST PROVIDED HISTORY: IV Only Contrast Ordering Physician Provided Reason for Exam: patient c/o abd pain for an hour FINDINGS: Lower Chest: Trace pleural fluid bilaterally is noted. Indwelling cardiac pacemaker is present. Heart size is normal.  Coronary artery calcifications are evident. The esophagus is significantly dilated and fluid-filled. No paraesophageal adenopathy is evident. Organs: The spleen, pancreas, and adrenals are unremarkable. The liver contains hypodensities, likely cysts. The gallbladder is distended and contains a solitary gallstone. The kidneys excrete contrast bilaterally. Extrarenal collecting systems are noted.   The ureters are mildly dilated down to the urinary bladder without evidence of intraluminal or ureteral obstructing calculi. GI/Bowel: Marked distention of the stomach is noted; this continues to the pylorus with appearance suggesting partial gastric outlet obstruction. Fluid is present in some small bowel loops and colon distal to the stomach. No small bowel obstruction. Pelvis: Marked distention of the urinary bladder is noted. 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 Distended ureters may be related to reflux or infection. No free pelvic fluid, pelvic or inguinal adenopathy is noted. Peritoneum/Retroperitoneum: No aortic aneurysm. Mild to moderate plaque is noted in the infrarenal abdominal aorta and both proximal iliac arteries. Shotty lymph nodes are present around the aorta in the upper abdomen. No mesenteric adenopathy is noted. Bones/Soft Tissues: Degenerative changes are present in the hips and lower lumbar facets. Estimated biologic radiation dose for this procedure:258.77 mGy/cm2.     1. Dilatation of the thoracic esophagus filled with fluid. No obstructive process is noted. No adjacent enlarged lymph nodes. 2. Trace pleural fluid. 3. Marked distention of the stomach. This appears to extend to the pylorus. Partial gastric outlet obstruction is not excluded. 4. Bilateral dilated ureters without obstructing calculi. Urinary bladder is markedly distended with bladder wall air suggesting emphysematous cystitis. Dilatation of the ureters may be related to reflux or infection. 5. Atherosclerotic disease. 6. Other findings as above. Critical results were called by Dr. Terrie Wilkerson MD to [address] on 4/11/2019 at 17:37. Kate Augustine Ericksondy Device Plmt/replace/removal    Result Date: 4/30/2019  PROCEDURE: ULTRASOUND GUIDED VASCULAR ACCESS. FLUOROSCOPY GUIDED PICC PLACEMENT 4/30/2019.  HISTORY: ORDERING SYSTEM PROVIDED HISTORY: TPN TECHNOLOGIST PROVIDED HISTORY: TPN Lumen?->Double Lumen SEDATION: None FLUOROSCOPY DOSE AND TYPE OR effusion, respiratory failure. Acuity: Acute Type of Exam: Initial FINDINGS: Heart size is normal.  Dual-chamber pacemaker placed via left subclavian approach. Right internal jugular central line has tip in distal superior vena cava. Interval removal of nasogastric tube. No interval change of infiltrate and atelectasis at the left lung base. Stable mild infiltrate in the left upper lobe. Mild interval improvement of infiltrate at the right lung base. Stable small bilateral pleural effusions, left larger than right. Mild CHF, stable. 1.  No interval change of infiltrate and atelectasis at the left lung base. Stable mild infiltrate in the left upper lobe. 2.  Mild interval improvement of infiltrate at the right lung base. 3.  Stable small bilateral pleural effusions, left larger than right. 4.  Mild CHF, stable. Xr Chest Portable    Result Date: 4/22/2019  EXAMINATION: SINGLE XRAY VIEW OF THE CHEST 4/22/2019 6:44 am COMPARISON: April 21, 2019. HISTORY: ORDERING SYSTEM PROVIDED HISTORY: Hypoxia and CHF TECHNOLOGIST PROVIDED HISTORY: Hypoxia and CHF Ordering Physician Provided Reason for Exam: hx of hypoxia/CHF Acuity: Acute Type of Exam: Initial FINDINGS: Right IJ central venous catheter appears in unchanged position. Nasogastric tube courses below the diaphragm, with tip not imaged. Left chest wall pacemaker device projects in unchanged position. Cardiac and mediastinal contours are enlarged but unchanged. Unchanged bilateral pleural effusions and bibasilar pulmonary opacities. Unchanged findings suggestive of congestive heart failure. No evidence of pneumothorax. No new osseous abnormalities. 1. No significant change in findings suggestive of congestive heart failure. 2. Unchanged bilateral pleural effusions and bibasilar pulmonary consolidations. RECOMMENDATION: Radiographic follow-up to complete resolution.      Xr Chest Portable    Result Date: 4/21/2019  EXAMINATION: SINGLE XRAY VIEW OF THE CHEST 4/21/2019 3:08 pm COMPARISON: April 19, 2019 HISTORY: ORDERING SYSTEM PROVIDED HISTORY: hypoxia TECHNOLOGIST PROVIDED HISTORY: hypoxia Ordering Physician Provided Reason for Exam: hypoxia Acuity: Unknown Type of Exam: Unknown FINDINGS: Enteric tube in the stomach. ET tube is been removed. Right IJ catheter terminates in the atrial caval junction. Bipolar pacer on the left unchanged. Heart and mediastinum unremarkable. Moderate edema unchanged. Small effusions, left greater than right. Bony thorax intact. Status post extubation. Life support apparatus otherwise stable. Moderate edema and effusions unchanged. Xr Chest Portable    Result Date: 4/19/2019  EXAMINATION: SINGLE XRAY VIEW OF THE CHEST 4/19/2019 5:51 am COMPARISON: April 18, 2019 HISTORY: ORDERING SYSTEM PROVIDED HISTORY: ETT placement TECHNOLOGIST PROVIDED HISTORY: ETT placement Ordering Physician Provided Reason for Exam: Vent Pt check ETT placement Acuity: Acute Type of Exam: Subsequent/Follow-up Additional signs and symptoms: Vent Pt check ETT placement FINDINGS: Tubes and lines are unchanged. Persistent bibasilar lung opacities and pleural effusions. Mild pulmonary edema. No significant interval change. Xr Chest Portable    Result Date: 4/18/2019  EXAMINATION: SINGLE XRAY VIEW OF THE CHEST 4/18/2019 6:21 am COMPARISON: April 17, 2019 HISTORY: ORDERING SYSTEM PROVIDED HISTORY: ETT placement TECHNOLOGIST PROVIDED HISTORY: ETT placement Ordering Physician Provided Reason for Exam: on vent Acuity: Acute Type of Exam: Initial FINDINGS: Right IJ line and NG tube are stable. Interval advancement of ET tube terminating 3.1 cm from ron. Implanted cardiac device is present. Left-sided effusion and associated airspace disease is stable. Hazy right basilar opacity possibly edema or atelectasis. No pneumothorax. Increased opacity left upper chest possibly focal area of atelectasis, new from prior.      Advanced ETT from prior Exam: on vent Acuity: Acute Type of Exam: Initial 64-year-old female on ventilator; check endotracheal tube placement FINDINGS: Portable AP upright view of the chest. Endotracheal tube distal tip overlying the mid trachea approximately 4.1 cm above the level of the ron. Enteric tube traverses the GE junction with distal tip excluded from the field of view. Left subclavian approach cardiac pacemaker device distal lead tips relatively stable in position. Right internal jugular approach central venous catheter distal tip overlying the high right atrium, stable. Cardiac monitor leads overlie the chest. Atherosclerotic calcification of the thoracic aorta. Slight stable volume loss of the left hemithorax. No pneumothorax. No free air. Dense retrocardiac/left basilar airspace consolidation and small left-sided pleural effusion. Stable mild focal opacity at the right mid lung zone. Underlying COPD. Stable mild pulmonary vascular congestion and left-sided predominant parahilar opacity. Visualized osseous structures remain unchanged. 1. Stable multifocal airspace disease as detailed above with dense retrocardiac/left basilar airspace consolidation and small left-sided pleural effusion. Mild pulmonary vascular congestion. Findings may represent edema or multifocal pneumonia. 2. Underlying COPD. 3. Tubes and line as detailed above. Xr Chest Portable    Result Date: 4/15/2019  EXAMINATION: SINGLE XRAY VIEW OF THE CHEST 4/15/2019 6:47 am COMPARISON: 14 April 2019 HISTORY: ORDERING SYSTEM PROVIDED HISTORY: ETT placement TECHNOLOGIST PROVIDED HISTORY: ETT placement Ordering Physician Provided Reason for Exam: on vent Acuity: Acute Type of Exam: Initial FINDINGS: AP portable view of the chest time stamped at 612 hours demonstrates overlying cardiac monitoring electrodes. Endotracheal tube terminates 4 cm above the ron. Bipolar pacemaker enters from the left with intact leads in appropriate positions. Intestinal tube extends beyond the fundus of the stomach, tip not included. Right internal jugular catheter terminates at the cavoatrial junction. Heart size is normal.  Aortic arch is calcified. There is interval improvement in vascular congestion with resolution of perihilar opacities. Some bibasilar opacities remain. No extrapleural air is noted. Osseous structures are stable. Interval improvement in vascular congestion and bilateral opacities consistent with resolving pulmonary edema. Tubes and lines as above. Xr Chest Portable    Result Date: 4/14/2019  EXAMINATION: SINGLE XRAY VIEW OF THE CHEST 4/14/2019 7:57 am COMPARISON: Portable chest 04/13/2019. HISTORY: ORDERING SYSTEM PROVIDED HISTORY: Intubation TECHNOLOGIST PROVIDED HISTORY: Intubation Ordering Physician Provided Reason for Exam: intubation Acuity: Acute Type of Exam: Initial Additional signs and symptoms: intubation FINDINGS: Endotracheal tube terminates over the midthoracic trachea. Dual-chamber pacemaker leads appear unchanged in position. Right IJ approach central venous catheter unchanged in position. Heart size not substantially changed. Perihilar and basilar opacities further increased. Left pleural effusion increased in size. Findings may reflect pulmonary edema, progressed from yesterday's exam.  Left pleural effusion increased in size. Xr Chest Portable    Result Date: 4/12/2019  EXAMINATION: SINGLE XRAY VIEW OF THE CHEST 4/12/2019 5:26 am COMPARISON: November 14, 2015. HISTORY: ORDERING SYSTEM PROVIDED HISTORY: line placement TECHNOLOGIST PROVIDED HISTORY: line placement Ordering Physician Provided Reason for Exam: New right side line placement. Acuity: Acute Type of Exam: Initial Additional signs and symptoms: New right side line placement. FINDINGS: Stable left pectoral trans venous cardiac pacer device. New right IJ central venous catheter with tip near the superior atrial caval junction. Normal lung volume. No new consolidation. Curvilinear radiopacity projecting over the right upper lobe likely represents artifact from a skin fold. No pleural effusion or pneumothorax. Stable cardiomediastinal silhouette and great vessels with redemonstration of atherosclerotic thoracic aorta. New right IJ central venous catheter with tip near the superior atrial caval junction. No pneumothorax. No new consolidation. Vl Upper Extremity Bilateral Venous Duplex    Result Date: 4/22/2019    [facility]  Vascular Upper Extremities Veins Procedure   Patient Name   Heena Park     Date of Study           04/22/2019                 Alexey Medina   Date of Birth  1950  Gender                  Female   Age            77 year(s)  Race                       Room Number    2002        Height:                 59.84 inch, 152 cm   Corporate ID # [id number]    Weight:                 102 pounds, 46.3 kg   Patient Acct # [de-identified]   BSA:        1.4 m^2     BMI:       20.03 kg/m^2   MR #           [id number]      Sonographer             Roderick Mario   Accession #    [id number]   Interpreting Physician  Branden Edouard   Referring                  Referring Physician     Hubert Suarez *  Nurse  Practitioner  Procedure Type of Study:   Veins: Upper Extremities Veins, Venous Scan Upper Bilateral.  Indications for Study:Swelling. Patient Status: In Patient. Technical Quality:Limited visualization. Limitation reason:Line placements. Conclusions   Summary   No evidence of superficial or deep venous thrombosis in both upper  extremities.    Signature   ----------------------------------------------------------------  Electronically signed by Roderick Mario(Sonographer) on  04/22/2019 11:46 AM  ----------------------------------------------------------------   ----------------------------------------------------------------  Electronically signed by Stonyford Decrearmando Oliveira(Interpreting  physician) on 04/22/2019 09:31 PM ----------------------------------------------------------------  Findings:   Right Impression:                  Left Impression:  Internal jugular vein not          The internal jugular, subclavian,  visualized due to line placement. axillary, brachial, radial, and ulnar                                     veins demonstrate normal  Subclavian, axillary, brachial,    compressibility with phasic Doppler  radial, and ulnar veins            signals. demonstrate normal compressibility  with phasic Doppler signals. Normal compressibility of the cephalic                                     vein. Normal compressibility of the  cephalic vein. Normal compressibility of the basilic                                     vein. Normal compressibility of the  basilic vein. Velocities are measured in cm/s ; Diameters are measured in cm Right UE Vein Measurements 2D Measurements +------------------------------------+----------+---------------+----------+ ! Location                            ! Visualized! Compressibility! Thrombosis! +------------------------------------+----------+---------------+----------+ ! Prox SCV                            ! Yes       ! Yes            ! None      ! +------------------------------------+----------+---------------+----------+ ! Dist SCV                            ! Yes       ! Yes            ! None      ! +------------------------------------+----------+---------------+----------+ ! Prox Axillary                       ! Yes       ! Yes            ! None      ! +------------------------------------+----------+---------------+----------+ ! Dist Axillary                       ! Yes       ! Yes            ! None      ! +------------------------------------+----------+---------------+----------+ ! Prox Brachial                       !Yes       ! Yes            ! None      ! +------------------------------------+----------+---------------+----------+ ! Dist Brachial                       !Yes !Yes            !None      ! +------------------------------------+----------+---------------+----------+ ! Prox Radial                         !Yes       ! Yes            ! None      ! +------------------------------------+----------+---------------+----------+ ! Dist Radial                         !Yes       ! Yes            ! None      ! +------------------------------------+----------+---------------+----------+ ! Prox Ulnar                          ! Yes       ! Yes            ! None      ! +------------------------------------+----------+---------------+----------+ ! Dist Ulnar                          ! Yes       ! Yes            ! None      ! +------------------------------------+----------+---------------+----------+ ! Basilic at UA                       ! Yes       ! Yes            ! None      ! +------------------------------------+----------+---------------+----------+ ! Basilic at AF                       ! Yes       ! Yes            ! None      ! +------------------------------------+----------+---------------+----------+ ! Basilic at [address]                       ! Yes       ! Yes            ! None      ! +------------------------------------+----------+---------------+----------+ ! Cephalic at UA                      ! Yes       ! Yes            ! None      ! +------------------------------------+----------+---------------+----------+ ! Cephalic at AF                      ! Yes       ! Yes            ! None      ! +------------------------------------+----------+---------------+----------+ ! Cephalic at [address]                      ! Yes       ! Yes            ! None      ! +------------------------------------+----------+---------------+----------+ Doppler Measurements +-------------------------+-----------------------+------------------------+ ! Location                 ! Signal                 !Reflux                  ! +-------------------------+-----------------------+------------------------+ ! SCV                      ! Phasic !Yes       !Yes            ! None      ! +------------------------------------+----------+---------------+----------+ ! Dist Radial                         !Yes       ! Yes            ! None      ! +------------------------------------+----------+---------------+----------+ ! Prox Ulnar                          ! Yes       ! Yes            ! None      ! +------------------------------------+----------+---------------+----------+ ! Dist Ulnar                          ! Yes       ! Yes            ! None      ! +------------------------------------+----------+---------------+----------+ ! Basilic at UA                       ! Yes       ! Yes            ! None      ! +------------------------------------+----------+---------------+----------+ ! Cephalic at UA                      ! Yes       ! Yes            ! None      ! +------------------------------------+----------+---------------+----------+ ! Cephalic at AF                      ! Yes       ! Yes            ! None      ! +------------------------------------+----------+---------------+----------+ Doppler Measurements +-------------------------+-----------------------+------------------------+ ! Location                 ! Signal                 !Reflux                  ! +-------------------------+-----------------------+------------------------+ ! IJV                      ! Phasic                 !                        ! +-------------------------+-----------------------+------------------------+ ! SCV                      ! Phasic                 !                        ! +-------------------------+-----------------------+------------------------+ ! Axillary                 ! Phasic                 !                        ! +-------------------------+-----------------------+------------------------+ ! Brachial                 !Phasic                 !                        ! +-------------------------+-----------------------+------------------------+    Ir Cholecystostomy Percutaneous appropriate. Normal small bowel activity is seen. Prominent activity within the common bile duct. The gallbladder is not visualized by the end of the exam.     Absent filling of the gallbladder consistent with acute cholecystitis. Physical Examination:        Physical Exam   Constitutional: She appears lethargic. She is sedated. Face mask in place. HENT:   Mouth/Throat: Oropharynx is clear and moist.   Cardiovascular: Regular rhythm and normal heart sounds. Tachycardia present. No murmur heard. Pulmonary/Chest: Effort normal and breath sounds normal. She has no wheezes. She has no rales. Abdominal: Soft. Bowel sounds are normal. She exhibits no distension and no mass. There is tenderness (mild, in all quadrants, worst in RUQ). Musculoskeletal: She exhibits no edema or tenderness. Neurological: She appears lethargic. Skin: Skin is dry. No erythema. No pallor. Nursing note and vitals reviewed.     Assessment:        Primary Problem  GI bleed    Active Hospital Problems    Diagnosis Date Noted    Small bowel obstruction ([address] [K56.609]     Anxiety disorder [F41.9]     Noncompliance [Z91.19]     Anemia [D64.9]     GI bleed [K92.2] 05/03/2019    Rectal bleeding [K62.5]     Centrilobular emphysema ([address] [J43.2] 04/30/2019    CRP elevated [R79.82]     Elevated procalcitonin [R79.89]     Bandemia [D72.825]     Cholecystitis [K81.9]     Abdominal distention [R14.0]     Elevated CEA [R97.0]     Elevated CA 19-9 level [R97.8]     Urinary retention [R33.9]     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]     Septic shock ([address] [A41.9, R65.21]     Leukemoid reaction [D72.823]     Aspiration pneumonia of right lower lobe due to vomit ([address] [J69.0]     MRSA carrier [Z22.322]     Sepsis due to urinary tract infection ([address] [A41.9, N39.0] 04/11/2019    Cystitis [N30.90] 04/11/2019       Plan:          Acute Respiratory Failure, possible due to aspiration pneumonia - possible sepsis  - No intubation per code status  - Transferred to ICU  - Pulm consulted; thank you for recs  - ID consulted; thank you for recs  - On BIPAP, Precedex, and Levophed ggt  - WBC 28.5, Procalcitonin 0.22, L.A. 2.2  - ABG ordered, labs adjusted   - Meropenem  - Vanc   - Lasix stopped - Na 153  - Bolus NS 1L   - Con. [place]on [place], Symbicort, Atrovent  - Blood culture and urinalysis ordered by ID     Abdominal pain, Ileus vs SBO  - IR placed NG  - Gen Surg consulted; thank you for the recs   - SBFT significantly delayed emptying after 6h    Anemia, likely due to GI Bleed  - Patient refusing intervention EGC/ colonoscopy   - Ethics was consulted, still not seen pt yet, phone call attempted   - Hgb 12.7 s/p 6u PRBC total  - Transfuse Hgb <7.0  - Continue to monitor   - GI consulted - appreciate recs     Psych consult  - Per psych consult, unable to completley evaluate, get mmse, rest of note regarding capacity vs competency   - Patient refusing all medical treatment   - MMSE score 22     Initial sepsis likely secondary to e.coli, emphysematous cystitis - resolved   - Urology consulted - appreciate recs - dyer dced (4/30)  - Cholecystotomy tube 4/22      Sinus Tachycardia  - Fluids given   - Hx of CBA, bradycardia with pacemaker   - Lopressor 12.5 BID - not taking NPO  - Lopressor PRN ordered   - Monitoring      Acute cholecystitis   - General surgery consulted - appreciate recs   - Surgical intervention: cholecystomy tube 4/22    - Cholecystectomy in patient when medically stable      Gastroparesis   - Protonix  - Reglan  - Daily EKG  - Considering systemic autonomic dysfunction as overlying diagnosis (gastroparesis, - neurogenic bladder, hypotension/fluctuating BPs)      Maintenance  - Heparin 5000u q12h restarted   - TPN per dietary, Phos 5.2   - Continue home meds trazodone, ASA, Plavix, Norvasc, Effexor, Spiriva     Dispo  - PT/OT eval and treat   - Social work [employer] planning     Patient refusing all surgical intervention.  Discussed in length risks of no medical management. Patient voiced understanding. Trying for appeal of LTAC, social work on    Plan to be discussed with attending Dr. Foster Hernandes MD  5/10/2019  7:24 AM         IM Attending    Pt seen and examined today   I have discussed the care of pt , including pertinent history and exam findings,  with the resident. I have reviewed the key elements of all parts of the encounter with the resident. I agree with the assessment, plan and orders as documented by the resident.     Electronically signed by Ruy Garnett MD on 5/10/2019 at 11:02 AM

## 2019-05-10 NOTE — PROGRESS NOTES
RN went in to assess patient with vital signs. Patient O2 sat low in the 60's. 4L NC placed, only came up to 85% at times. Venti mask placed, on 50% FIO2, still low. RR called.

## 2019-05-10 NOTE — PROGRESS NOTES
Called the only number we have in the chart to update family. Phone went straight to voicemail. Voicemail left to call back for an update.

## 2019-05-10 NOTE — PROGRESS NOTES
Results for Aris Richardson (MRN [id number]) as of 5/10/2019 16:18   Ref. Range 5/10/2019 15:33   Potassium Latest Ref Range: 3.7 - 5.3 mmol/L 5.8 (H)     Nurse called pharmacy to update on latest k level and new orders for TPN. Pharmacist will talk to dietician and make any adjustments accordingly. Nurse also called resident and updated him potassium level. Resident will call with any further orders.

## 2019-05-10 NOTE — PROGRESS NOTES
+2 NATALIA CHRISTIAN. · Wound Type: Pressure Ulcer, Stage II, Deep Tissue Injury(Wound under cast)  · Current Nutrition Therapies:  · Oral Diet Orders: NPO   · Oral Diet intake: NPO  · Oral Nutrition Supplement (ONS) Orders: None  · ONS intake: NPO  · Parenteral Nutrition Orders:  · Type and Formula: Premix Central, 2-in-1 Custom   · Lipids: 100ml, Daily  · Rate/Volume: 65 ml/ 1560 ml daily  · Duration: Continuous  · Current PN Order Provides: 1573 kcals, 78 gm of protein (lipids included)  · Goal PN Orders Provides: 3852-3313 kcal; 63-68 gm pro  · Additional Calories: None  · Anthropometric Measures:  · Ht: 5' (152.4 cm)   · Current Body Wt: 102 lb 1.2 oz (46.3 kg)  · Admission Body Wt: 102 lb (46.3 kg)  · Ideal Body Wt: 100 lb (45.4 kg), % Ideal Body 102%  · BMI Classification: BMI 18.5 - 24.9 Normal Weight(Based on admission wt)    Nutrition Interventions:   Continue NPO, Modify current Parenteral Nutrition  Continued Inpatient Monitoring, Education not appropriate at this time    Nutrition Evaluation:   · Evaluation: Progressing toward goals   · Goals: PN to meet greater 90% of estimated nutrition needs   · Monitoring: Nutrition Progression, PN Intake, PN Tolerance, Wound Healing, I&O, Weight, Pertinent Labs, Monitor Bowel Function, Mental Status/Confusion      SHITAL Rosales R.D..   Clinical Dietitian  Office: [phone]

## 2019-05-10 NOTE — FLOWSHEET NOTE
05/10/19 0236   Provider Notification   Reason for Communication New orders   Provider Name Dr. Gretchen Hsu   Provider Notification Physician   Method of Communication Secure Message   Response See orders   Notification Time 0234     Dr. Gretchen Hsu updated on patient transfer to ICU as well as the barium swallow study that was not able to be finished due to the barium not moving through the patient's bowels. Dr. Gretchen Hsu ordered to hook the patient's NG up to suction. See new orders.

## 2019-05-10 NOTE — PROGRESS NOTES
Dr. Rosa Barrios rounded on patient. Dr. Rosa Barrios feels patient needs to have surgery. Nurse updated Dr. Rosa Barrios that patient's brother, Paula Ambrose, is first emergency contact and next of kin. Dr. Rosa Barrios called patient's brother Paula Ambrose. Dr. Rosa Barrios explained that in his opinion patient needs surgery. He explained that patient is no longer of sound mind to make decisions and has been progressively become sicker and sicker since she has been refusing treatments. Dr. Rosa Barrios requests that brother, Paula Ambrose, make decision for further direction in treatment. He asked that Paula Ambrose make decision for patient- if they want patient to have surgery, or if he wants to not procede with surgery and just keep comfortable. Dr. Rosa Barrios asked that family take the time today to decide and call nurses station back with decisions for further treatment. Dr. Rosa Barrios requested that nurse call risk management and verify that brother, Paula Ambrose, is okay to make decisions on patient's behalf now. Nurse called Torey Dutta, risk management. No answer. Nurse left voicemail requesting call back. Nurse updated Dr. Rosa Barrios that nurse left voicemail for risk management and nurse will update him when she calls back.

## 2019-05-10 NOTE — FLOWSHEET NOTE
F/U visit regarding RRT called last evening; patient sleeping; no family present;     05/10/19 1255   Encounter Summary   Services provided to: Patient   Referral/Consult From: Palliative Care   Complexity of Encounter Low   Length of Encounter 15 minutes   Spiritual/Mosque   Type Spiritual support   Assessment Sleeping   Intervention Prayer   Outcome Did not respond

## 2019-05-10 NOTE — PROGRESS NOTES
Patient's first emergency contact, Lawyer Gresham and his wife Paul Choi, called nurse for update. Nurse updated them that patient is back in ICU on continuous bipap. Patient had increase respirations, decrease oxygen level, decrease blood pressure, and increase heart rate over night. Nurse updated them that patient is on pressor to keep blood pressure up and presidex to help her relax while on the bipap which does sedate her. Nurse informed them that her WBC has increased today as well. Pat expressed that patient has a son that patient does not talk to or have a valid number for. Nurse informed them that Lawyer Gresham is listed as first emergency contact, and this nurse has been told he is next of kin and needs to help make further decisions as patient is no longer in sound mind to make decisions. Patient moans in pain but will not answer questions. Nurse informed them that nurse will ask palliative care to reach out to family.

## 2019-05-10 NOTE — CARE COORDINATION
SW attempted to find this patient's brother's phone number in order to call him about what is going on with the patient, however, SW could not locate the number in the chart. RODGER noted that his name is Madeline Samson and that he was involved when the patient was in [place] at [place]. RODGER called Bridger Servin in admissions to see if they could give me this number. Bridger Servin will find his number this AM and call this SW with the number. At that time, SW will call the brother Byron Marsh. SW following. ADD:  SW did find the number this morning as the contact list was updated this AM to add this patient's brother Madeline Samson. RODGER called and asked to speak with Andi Manrique spoke with Madeline Samson and his wife concerning the options for her to DC. While RODGER was on the phone with her family, the ICU nurse did call and they informed the nurse that they would call her back. RODGER spoke with them a little longer concerning the different options that are available and also asking to see if Madeline Mali could assist with decision making should that be necessary. At this point, Madeline Mali did not say whether or not he could take on this role. RODGER did encourage them to call the ICU nurse back and get more of the medical picture from her as this is beyond this SW's scope of practice. SW provided the contact number to them and informed them to all at any time that they have a question. RODGER informed them if RODGER does not know the answer, SW can find out and call them back. RODGER will continue to follow.

## 2019-05-10 NOTE — PROGRESS NOTES
ICU Progress Note (Non-Vent)  [facility]    Patient - Yoselin Ayala,  Age - 77 y.o.    - 1950      Room Number -    MRN -  [id number]   [facility]  Date of Admission -  2019  2:48 PM    Events of Past 24 Hours   Patient had barium study yesterday with small bowel follow-through which showed significant delay in movement of barium. Early this morning, developed respiratory distress with hypoxic respiratory failure (O2 sat dropped to 60% on 4 L) and patient transferred to ICU. No ABG obtained. Patient unresponsive. She moans and does not follow commands. Vitals    height is 5' (1.524 m) and weight is 102 lb 1.2 oz (46.3 kg). Her axillary temperature is 97.6 °F (36.4 °C). Her blood pressure is 96/46 (abnormal) and her pulse is 130. Her respiration is 41 (abnormal) and oxygen saturation is 97%.        Temperature Range: Temp: 97.6 °F (36.4 °C) Temp  Av.2 °F (36.8 °C)  Min: 97.5 °F (36.4 °C)  Max: 99.6 °F (37.6 °C)  BP Range:  Systolic (70GEN), XIP:399 , Min:90 , YRI:879     Diastolic (89JRW), RYA:66, Min:37, Max:111    Pulse Range: Pulse  Av.3  Min: 66  Max: 140  Respiration Range: Resp  Av  Min: 20  Max: 50  Current Pulse Ox[de-identified]  SpO2: 97 %  24HR Pulse Ox Range:  SpO2  Av.4 %  Min: 81 %  Max: 97 %  Oxygen Amount and Delivery: O2 Flow Rate (L/min): 0 L/min    Wt Readings from Last 3 Encounters:   19 102 lb 1.2 oz (46.3 kg)   19 89 lb (40.4 kg)   19 94 lb 1.6 oz (42.7 kg)     I/O       Intake/Output Summary (Last 24 hours) at 5/10/2019 0800  Last data filed at 2019 1822  Gross per 24 hour   Intake 678 ml   Output --   Net 678 ml     DRAIN/TUBE OUTPUT       Invasive Lines   ICP PRESSURE RANGE  No data recorded  CVP PRESSURE RANGE  No data recorded      Medications      cefTRIAXone (ROCEPHIN) IV  1 g Intravenous Q24H    metroNIDAZOLE  500 mg Intravenous Q8H    alteplase  1 mg Intracatheter Once    sodium chloride  1,000 mL Intravenous Once    oxymetazoline  2 spray Each Nare BID    ipratropium  0.5 mg Nebulization 4x daily    levalbuterol  1.25 mg Nebulization 4x Daily    sodium chloride  250 mL Intravenous Once    sodium chloride  250 mL Intravenous Once    predniSONE  10 mg Oral Daily    mometasone-formoterol  2 puff Inhalation BID    pantoprazole  40 mg Oral QAM AC    metoprolol tartrate  12.5 mg Oral BID    fat emulsion  100 mL Intravenous Daily    magnesium sulfate  1 g Intravenous Once    insulin lispro  0-12 Units Subcutaneous Q6H    venlafaxine  37.5 mg Oral TID    aspirin  81 mg Oral Daily    sodium chloride flush  10 mL Intravenous 2 times per day     diatrizoate meglumine-sodium, sodium chloride, sodium chloride flush, morphine, LORazepam, ondansetron, zolpidem, sodium chloride flush, hydrOXYzine, metoprolol, sodium chloride nebulizer, fentanNYL, oxyCODONE-acetaminophen, magnesium sulfate, sodium phosphate IVPB **OR** sodium phosphate IVPB, potassium chloride **OR** potassium alternative oral replacement **OR** potassium chloride, glucose, dextrose, glucagon (rDNA), dextrose, milk and molasses, sodium chloride flush, acetaminophen  IV Drips/Infusions   dexmedetomidine (PRECEDEX) IV infusion 0.4 mcg/kg/hr (05/10/19 0731)    PN-Adult 2-in-1 Central Line (Standard) 65 mL/hr at 05/09/19 1814    dextrose         Diet/Nutrition   Diet NPO Effective Now  PN-Adult 2-in-1 Central Line (Standard)    Exam      Constitutional - lethargic does not follow any commands  General Appearance  frail and malnourished   HEENT -normocephalic, atraumatic. PERRLA  Lungs - Chest expands equally, no wheezes, tachypneic with poor air movement Cardiovascular - Heart sounds are normal.  normal rate and rhythm regular, no murmur, gallop or rub. Abdomen - tender,distended, no masses or organomegaly  Neurologic - CN II-XII are grossly intact.  There are no focal motor deficits  Skin - no bruising or bleeding  Extremities - no cyanosis, clubbing or edema    Lab Results   CBC     Lab Results   Component Value Date    WBC 12.1 05/08/2019    RBC 2.53 05/08/2019    RBC 3.66 05/23/2012    HGB 11.7 05/09/2019    HCT 37.1 05/09/2019     05/08/2019     05/23/2012    MCV 88.5 05/08/2019    MCH 28.4 05/08/2019    MCHC 32.0 05/08/2019    RDW 17.2 05/08/2019    METASPCT 2 05/07/2019    LYMPHOPCT 16 05/08/2019    MONOPCT 6 05/08/2019    MYELOPCT 1 05/07/2019    BASOPCT 0 05/08/2019    MONOSABS 0.73 05/08/2019    LYMPHSABS 1.94 05/08/2019    EOSABS 0.00 05/08/2019    BASOSABS 0.00 05/08/2019    DIFFTYPE NOT REPORTED 05/08/2019       BMP   Lab Results   Component Value Date     05/10/2019    K 4.8 05/10/2019     05/10/2019    CO2 22 05/10/2019    BUN 36 05/10/2019    CREATININE 0.55 05/10/2019    GLUCOSE 169 05/10/2019    GLUCOSE 87 05/21/2012       LFTS  Lab Results   Component Value Date    ALKPHOS 174 05/09/2019    ALT 13 05/09/2019    AST 16 05/09/2019    PROT 5.3 05/09/2019    BILITOT 0.64 05/09/2019    BILIDIR 0.20 04/18/2019    IBILI 0.26 04/18/2019    LABALBU 2.0 05/09/2019    LABALBU 4.6 05/17/2012       ABG ABGs:   Lab Results   Component Value Date    PHART 7.519 04/19/2019    PO2ART 73.3 04/19/2019    HDW7CSM 42.5 04/19/2019       Lab Results   Component Value Date    MODE PRVC 04/19/2019         INR  Recent Labs     05/08/19  0515 05/09/19  0528 05/10/19  0657   PROTIME 16.1* 16.7* 16.8*   INR 1.3 1.3 1.4       APTT  No results for input(s): APTT in the last 72 hours. Lactic Acid  Lab Results   Component Value Date    LACTA 1.5 04/24/2019    LACTA 2.1 04/14/2019    LACTA 1.5 04/12/2019        BNP   No results for input(s): BNP in the last 72 hours.      Cultures       Radiology     CXR      Small left pleural effusion with perhaps minimal right midlung infiltrate      SYSTEMS ASSESSMENT    Acute hypoxic respiratory failure  Severe protein calorie malnutrition  Possible recurrent aspiration pneumonia  COPD severe clinically  Hypernatremia  Hypotension on multiple probably hypovolemic but need to rule out septic shock    Neuro   Unresponsive due to an encephalopathy most likely metabolic    Respiratory   Wean oxygen as tolerated. Keep O2 sat > 88%  Continue BiPAP as tolerated  Check ABG stat  Increased aerosols to every 4 hours  Follow-up x-ray tomorrow  Keep the head of the bed elevated as much as possible    Cardiovascular   Appears to be in shock, will give IV fluid bolus  Begin Norepinephrine, titrate to keep map greater than 65  Check stat lactic acid  Stat cortisol level     Gastrointestinal   Continue TPN    Renal   Discontinue Lasix  Check electrolytes every 4 hours do not want to over correct sodium    Infectious Disease   Antibiotics per infectious disease  Check pro calcitonin level    Hematology/Oncology   Place on subcu heparin for DVT prophylaxis    Endocrine   Monitor blood sugar    Social/Spiritual/DNR/Disposition/Other   Main problem is that there is no family involved.   Is limited code status  Should be ICU level of care  Discussed in detail with housestaff    Critical Care Time   35 min    Electronically signed by Marilin Aguirre MD on 5/10/2019 at 8:00 AM

## 2019-05-10 NOTE — FLOWSHEET NOTE
05/10/19 0245   Provider Notification   Reason for Communication New orders   Provider Name Dr. Constantino Gutierrez   Provider Notification Physician   Method of Communication Call   Response See orders   Notification Time [phone]

## 2019-05-11 ENCOUNTER — APPOINTMENT (OUTPATIENT)
Dept: GENERAL RADIOLOGY | Age: 69
DRG: 853 | End: 2019-05-11
Payer: COMMERCIAL

## 2019-05-11 LAB
-: NORMAL
ABSOLUTE BANDS #: 6.42 K/UL (ref 0–1)
ABSOLUTE EOS #: 0 K/UL (ref 0–0.4)
ABSOLUTE IMMATURE GRANULOCYTE: ABNORMAL K/UL (ref 0–0.3)
ABSOLUTE LYMPH #: 0.43 K/UL (ref 1–4.8)
ABSOLUTE MONO #: 1.71 K/UL (ref 0.1–1.3)
ALBUMIN SERPL-MCNC: 1.9 G/DL (ref 3.5–5.2)
ALBUMIN/GLOBULIN RATIO: ABNORMAL (ref 1–2.5)
ALP BLD-CCNC: 196 U/L (ref 35–104)
ALT SERPL-CCNC: 18 U/L (ref 5–33)
ANION GAP SERPL CALCULATED.3IONS-SCNC: 10 MMOL/L (ref 9–17)
ANION GAP SERPL CALCULATED.3IONS-SCNC: 13 MMOL/L (ref 9–17)
ANION GAP SERPL CALCULATED.3IONS-SCNC: 7 MMOL/L (ref 9–17)
ANION GAP SERPL CALCULATED.3IONS-SCNC: 8 MMOL/L (ref 9–17)
ANION GAP SERPL CALCULATED.3IONS-SCNC: 8 MMOL/L (ref 9–17)
AST SERPL-CCNC: 19 U/L
ATYPICAL LYMPHOCYTE ABSOLUTE COUNT: 0.64 K/UL
ATYPICAL LYMPHOCYTES: 3 %
BANDS: 30 % (ref 0–10)
BASOPHILS # BLD: 0 % (ref 0–2)
BASOPHILS ABSOLUTE: 0 K/UL (ref 0–0.2)
BILIRUB SERPL-MCNC: 0.38 MG/DL (ref 0.3–1.2)
BILIRUBIN DIRECT: 0.21 MG/DL
BILIRUBIN, INDIRECT: 0.17 MG/DL (ref 0–1)
BUN BLDV-MCNC: 38 MG/DL (ref 8–23)
BUN BLDV-MCNC: 38 MG/DL (ref 8–23)
BUN BLDV-MCNC: 39 MG/DL (ref 8–23)
BUN BLDV-MCNC: 40 MG/DL (ref 8–23)
BUN BLDV-MCNC: 41 MG/DL (ref 8–23)
BUN/CREAT BLD: ABNORMAL (ref 9–20)
CALCIUM SERPL-MCNC: 7.2 MG/DL (ref 8.6–10.4)
CALCIUM SERPL-MCNC: 7.5 MG/DL (ref 8.6–10.4)
CALCIUM SERPL-MCNC: 7.7 MG/DL (ref 8.6–10.4)
CALCIUM SERPL-MCNC: 8.2 MG/DL (ref 8.6–10.4)
CALCIUM SERPL-MCNC: 8.2 MG/DL (ref 8.6–10.4)
CHLORIDE BLD-SCNC: 112 MMOL/L (ref 98–107)
CHLORIDE BLD-SCNC: 114 MMOL/L (ref 98–107)
CHLORIDE BLD-SCNC: 114 MMOL/L (ref 98–107)
CHLORIDE BLD-SCNC: 115 MMOL/L (ref 98–107)
CHLORIDE BLD-SCNC: 118 MMOL/L (ref 98–107)
CO2: 19 MMOL/L (ref 20–31)
CO2: 21 MMOL/L (ref 20–31)
CO2: 23 MMOL/L (ref 20–31)
CO2: 24 MMOL/L (ref 20–31)
CO2: 24 MMOL/L (ref 20–31)
CREAT SERPL-MCNC: 0.42 MG/DL (ref 0.5–0.9)
CREAT SERPL-MCNC: <0.4 MG/DL (ref 0.5–0.9)
DIFFERENTIAL TYPE: ABNORMAL
EOSINOPHILS RELATIVE PERCENT: 0 % (ref 0–4)
GFR AFRICAN AMERICAN: >60 ML/MIN
GFR AFRICAN AMERICAN: ABNORMAL ML/MIN
GFR NON-AFRICAN AMERICAN: >60 ML/MIN
GFR NON-AFRICAN AMERICAN: ABNORMAL ML/MIN
GFR SERPL CREATININE-BSD FRML MDRD: ABNORMAL ML/MIN/{1.73_M2}
GLOBULIN: ABNORMAL G/DL (ref 1.5–3.8)
GLUCOSE BLD-MCNC: 115 MG/DL (ref 70–99)
GLUCOSE BLD-MCNC: 117 MG/DL (ref 65–105)
GLUCOSE BLD-MCNC: 119 MG/DL (ref 65–105)
GLUCOSE BLD-MCNC: 129 MG/DL (ref 70–99)
GLUCOSE BLD-MCNC: 134 MG/DL (ref 70–99)
GLUCOSE BLD-MCNC: 140 MG/DL (ref 70–99)
GLUCOSE BLD-MCNC: 148 MG/DL (ref 65–105)
GLUCOSE BLD-MCNC: 155 MG/DL (ref 65–105)
GLUCOSE BLD-MCNC: 176 MG/DL (ref 70–99)
HCT VFR BLD CALC: 28.4 % (ref 36–46)
HCT VFR BLD CALC: 31 % (ref 36–46)
HCT VFR BLD CALC: 33.8 % (ref 36–46)
HEMOGLOBIN: 10.7 G/DL (ref 12–16)
HEMOGLOBIN: 9.2 G/DL (ref 12–16)
HEMOGLOBIN: 9.8 G/DL (ref 12–16)
IMMATURE GRANULOCYTES: ABNORMAL %
INR BLD: 1.5
LACTIC ACID: 0.9 MMOL/L (ref 0.5–2.2)
LYMPHOCYTES # BLD: 2 % (ref 24–44)
MAGNESIUM: 2.2 MG/DL (ref 1.6–2.6)
MCH RBC QN AUTO: 27.7 PG (ref 26–34)
MCHC RBC AUTO-ENTMCNC: 31.6 G/DL (ref 31–37)
MCV RBC AUTO: 87.6 FL (ref 80–100)
METAMYELOCYTES ABSOLUTE COUNT: 0.21 K/UL
METAMYELOCYTES: 1 %
MONOCYTES # BLD: 8 % (ref 1–7)
MORPHOLOGY: ABNORMAL
NRBC AUTOMATED: ABNORMAL PER 100 WBC
PDW BLD-RTO: 17.2 % (ref 11.5–14.9)
PHOSPHORUS: 2.4 MG/DL (ref 2.6–4.5)
PLATELET # BLD: 318 K/UL (ref 150–450)
PLATELET ESTIMATE: ABNORMAL
PMV BLD AUTO: 8.3 FL (ref 6–12)
POTASSIUM SERPL-SCNC: 3.2 MMOL/L (ref 3.7–5.3)
POTASSIUM SERPL-SCNC: 3.5 MMOL/L (ref 3.7–5.3)
POTASSIUM SERPL-SCNC: 3.6 MMOL/L (ref 3.7–5.3)
POTASSIUM SERPL-SCNC: 3.6 MMOL/L (ref 3.7–5.3)
POTASSIUM SERPL-SCNC: 4.5 MMOL/L (ref 3.7–5.3)
PROTHROMBIN TIME: 17.7 SEC (ref 11.8–14.6)
RBC # BLD: 3.86 M/UL (ref 4–5.2)
RBC # BLD: ABNORMAL 10*6/UL
REASON FOR REJECTION: NORMAL
SEG NEUTROPHILS: 56 % (ref 36–66)
SEGMENTED NEUTROPHILS ABSOLUTE COUNT: 11.99 K/UL (ref 1.3–9.1)
SODIUM BLD-SCNC: 141 MMOL/L (ref 135–144)
SODIUM BLD-SCNC: 146 MMOL/L (ref 135–144)
SODIUM BLD-SCNC: 151 MMOL/L (ref 135–144)
TOTAL PROTEIN: 5.9 G/DL (ref 6.4–8.3)
VANCOMYCIN TROUGH DATE LAST DOSE: NORMAL
VANCOMYCIN TROUGH DOSE AMOUNT: 750
VANCOMYCIN TROUGH TIME LAST DOSE: 1057
VANCOMYCIN TROUGH: 15.5 UG/ML (ref 10–20)
WBC # BLD: 21.4 K/UL (ref 3.5–11)
WBC # BLD: ABNORMAL 10*3/UL
ZZ NTE CLEAN UP: ORDERED TEST: NORMAL
ZZ NTE WITH NAME CLEAN UP: SPECIMEN SOURCE: NORMAL

## 2019-05-11 PROCEDURE — 80202 ASSAY OF VANCOMYCIN: CPT

## 2019-05-11 PROCEDURE — 85018 HEMOGLOBIN: CPT

## 2019-05-11 PROCEDURE — 94660 CPAP INITIATION&MGMT: CPT

## 2019-05-11 PROCEDURE — 94762 N-INVAS EAR/PLS OXIMTRY CONT: CPT

## 2019-05-11 PROCEDURE — 2580000003 HC RX 258: Performed by: INTERNAL MEDICINE

## 2019-05-11 PROCEDURE — 6360000002 HC RX W HCPCS: Performed by: STUDENT IN AN ORGANIZED HEALTH CARE EDUCATION/TRAINING PROGRAM

## 2019-05-11 PROCEDURE — 83605 ASSAY OF LACTIC ACID: CPT

## 2019-05-11 PROCEDURE — 2500000003 HC RX 250 WO HCPCS: Performed by: STUDENT IN AN ORGANIZED HEALTH CARE EDUCATION/TRAINING PROGRAM

## 2019-05-11 PROCEDURE — 6360000002 HC RX W HCPCS: Performed by: INTERNAL MEDICINE

## 2019-05-11 PROCEDURE — 2580000003 HC RX 258: Performed by: STUDENT IN AN ORGANIZED HEALTH CARE EDUCATION/TRAINING PROGRAM

## 2019-05-11 PROCEDURE — 83735 ASSAY OF MAGNESIUM: CPT

## 2019-05-11 PROCEDURE — 6370000000 HC RX 637 (ALT 250 FOR IP): Performed by: INTERNAL MEDICINE

## 2019-05-11 PROCEDURE — 2500000003 HC RX 250 WO HCPCS: Performed by: INTERNAL MEDICINE

## 2019-05-11 PROCEDURE — 82947 ASSAY GLUCOSE BLOOD QUANT: CPT

## 2019-05-11 PROCEDURE — C9113 INJ PANTOPRAZOLE SODIUM, VIA: HCPCS | Performed by: STUDENT IN AN ORGANIZED HEALTH CARE EDUCATION/TRAINING PROGRAM

## 2019-05-11 PROCEDURE — 97110 THERAPEUTIC EXERCISES: CPT

## 2019-05-11 PROCEDURE — 80048 BASIC METABOLIC PNL TOTAL CA: CPT

## 2019-05-11 PROCEDURE — 85025 COMPLETE CBC W/AUTO DIFF WBC: CPT

## 2019-05-11 PROCEDURE — 36415 COLL VENOUS BLD VENIPUNCTURE: CPT

## 2019-05-11 PROCEDURE — 2700000000 HC OXYGEN THERAPY PER DAY

## 2019-05-11 PROCEDURE — 2000000000 HC ICU R&B

## 2019-05-11 PROCEDURE — 99232 SBSQ HOSP IP/OBS MODERATE 35: CPT | Performed by: NURSE PRACTITIONER

## 2019-05-11 PROCEDURE — 85014 HEMATOCRIT: CPT

## 2019-05-11 PROCEDURE — 2580000003 HC RX 258: Performed by: FAMILY MEDICINE

## 2019-05-11 PROCEDURE — 94640 AIRWAY INHALATION TREATMENT: CPT

## 2019-05-11 PROCEDURE — 2500000003 HC RX 250 WO HCPCS: Performed by: SURGERY

## 2019-05-11 PROCEDURE — 84100 ASSAY OF PHOSPHORUS: CPT

## 2019-05-11 PROCEDURE — 2500000003 HC RX 250 WO HCPCS: Performed by: FAMILY MEDICINE

## 2019-05-11 PROCEDURE — 80076 HEPATIC FUNCTION PANEL: CPT

## 2019-05-11 PROCEDURE — 85610 PROTHROMBIN TIME: CPT

## 2019-05-11 PROCEDURE — 71045 X-RAY EXAM CHEST 1 VIEW: CPT

## 2019-05-11 PROCEDURE — 99999 PR OFFICE/OUTPT VISIT,PROCEDURE ONLY: CPT | Performed by: INTERNAL MEDICINE

## 2019-05-11 RX ORDER — DEXTROSE, SODIUM CHLORIDE, AND POTASSIUM CHLORIDE 5; .45; .15 G/100ML; G/100ML; G/100ML
INJECTION INTRAVENOUS CONTINUOUS
Status: DISCONTINUED | OUTPATIENT
Start: 2019-05-11 | End: 2019-05-15

## 2019-05-11 RX ORDER — HALOPERIDOL 5 MG/ML
2 INJECTION INTRAMUSCULAR EVERY 4 HOURS PRN
Status: DISCONTINUED | OUTPATIENT
Start: 2019-05-11 | End: 2019-05-12

## 2019-05-11 RX ORDER — METOPROLOL TARTRATE 5 MG/5ML
5 INJECTION INTRAVENOUS EVERY 12 HOURS
Status: DISCONTINUED | OUTPATIENT
Start: 2019-05-11 | End: 2019-05-18

## 2019-05-11 RX ORDER — PANTOPRAZOLE SODIUM 40 MG/10ML
40 INJECTION, POWDER, LYOPHILIZED, FOR SOLUTION INTRAVENOUS DAILY
Status: DISCONTINUED | OUTPATIENT
Start: 2019-05-11 | End: 2019-05-15

## 2019-05-11 RX ORDER — 0.9 % SODIUM CHLORIDE 0.9 %
10 VIAL (ML) INJECTION DAILY
Status: DISCONTINUED | OUTPATIENT
Start: 2019-05-11 | End: 2019-05-15

## 2019-05-11 RX ADMIN — HALOPERIDOL LACTATE 2 MG: 5 INJECTION, SOLUTION INTRAMUSCULAR at 20:09

## 2019-05-11 RX ADMIN — DEXMEDETOMIDINE HYDROCHLORIDE 1.4 MCG/KG/HR: 100 INJECTION, SOLUTION INTRAVENOUS at 16:16

## 2019-05-11 RX ADMIN — CALCIUM GLUCONATE: 94 INJECTION, SOLUTION INTRAVENOUS at 17:34

## 2019-05-11 RX ADMIN — MORPHINE SULFATE 2 MG: 2 INJECTION, SOLUTION INTRAMUSCULAR; INTRAVENOUS at 05:19

## 2019-05-11 RX ADMIN — MEROPENEM 1 G: 1 INJECTION, POWDER, FOR SOLUTION INTRAVENOUS at 17:34

## 2019-05-11 RX ADMIN — MORPHINE SULFATE 2 MG: 2 INJECTION, SOLUTION INTRAMUSCULAR; INTRAVENOUS at 20:02

## 2019-05-11 RX ADMIN — MORPHINE SULFATE 2 MG: 2 INJECTION, SOLUTION INTRAMUSCULAR; INTRAVENOUS at 00:24

## 2019-05-11 RX ADMIN — POTASSIUM CHLORIDE, DEXTROSE MONOHYDRATE AND SODIUM CHLORIDE: 150; 5; 450 INJECTION, SOLUTION INTRAVENOUS at 16:12

## 2019-05-11 RX ADMIN — MEROPENEM 1 G: 1 INJECTION, POWDER, FOR SOLUTION INTRAVENOUS at 03:50

## 2019-05-11 RX ADMIN — LEVALBUTEROL 1.25 MG: 1.25 SOLUTION, CONCENTRATE RESPIRATORY (INHALATION) at 06:57

## 2019-05-11 RX ADMIN — I.V. FAT EMULSION 100 ML: 20 EMULSION INTRAVENOUS at 17:34

## 2019-05-11 RX ADMIN — HEPARIN SODIUM 5000 UNITS: 5000 INJECTION, SOLUTION INTRAVENOUS; SUBCUTANEOUS at 20:02

## 2019-05-11 RX ADMIN — FENTANYL CITRATE 50 MCG: 50 INJECTION INTRAMUSCULAR; INTRAVENOUS at 10:22

## 2019-05-11 RX ADMIN — FENTANYL CITRATE 50 MCG: 50 INJECTION INTRAMUSCULAR; INTRAVENOUS at 07:40

## 2019-05-11 RX ADMIN — FENTANYL CITRATE: 50 INJECTION INTRAMUSCULAR; INTRAVENOUS at 12:26

## 2019-05-11 RX ADMIN — VANCOMYCIN HYDROCHLORIDE 750 MG: 5 INJECTION, POWDER, LYOPHILIZED, FOR SOLUTION INTRAVENOUS at 10:57

## 2019-05-11 RX ADMIN — MEROPENEM 1 G: 1 INJECTION, POWDER, FOR SOLUTION INTRAVENOUS at 10:19

## 2019-05-11 RX ADMIN — LEVALBUTEROL 1.25 MG: 1.25 SOLUTION, CONCENTRATE RESPIRATORY (INHALATION) at 04:24

## 2019-05-11 RX ADMIN — IPRATROPIUM BROMIDE 0.5 MG: 0.5 SOLUTION RESPIRATORY (INHALATION) at 06:57

## 2019-05-11 RX ADMIN — DEXMEDETOMIDINE HYDROCHLORIDE 0.9 MCG/KG/HR: 100 INJECTION, SOLUTION INTRAVENOUS at 08:51

## 2019-05-11 RX ADMIN — DEXMEDETOMIDINE HYDROCHLORIDE 1 MCG/KG/HR: 100 INJECTION, SOLUTION INTRAVENOUS at 21:48

## 2019-05-11 RX ADMIN — Medication 10 ML: at 10:25

## 2019-05-11 RX ADMIN — PANTOPRAZOLE SODIUM 40 MG: 40 INJECTION, POWDER, FOR SOLUTION INTRAVENOUS at 10:25

## 2019-05-11 RX ADMIN — IPRATROPIUM BROMIDE 0.5 MG: 0.5 SOLUTION RESPIRATORY (INHALATION) at 04:25

## 2019-05-11 RX ADMIN — INSULIN LISPRO 2 UNITS: 100 INJECTION, SOLUTION INTRAVENOUS; SUBCUTANEOUS at 00:24

## 2019-05-11 ASSESSMENT — PAIN DESCRIPTION - PAIN TYPE: TYPE: CHRONIC PAIN

## 2019-05-11 ASSESSMENT — PAIN SCALES - GENERAL
PAINLEVEL_OUTOF10: 10
PAINLEVEL_OUTOF10: 5
PAINLEVEL_OUTOF10: 9
PAINLEVEL_OUTOF10: 10
PAINLEVEL_OUTOF10: 10
PAINLEVEL_OUTOF10: 5
PAINLEVEL_OUTOF10: 9
PAINLEVEL_OUTOF10: 10
PAINLEVEL_OUTOF10: 5
PAINLEVEL_OUTOF10: 9
PAINLEVEL_OUTOF10: 8

## 2019-05-11 ASSESSMENT — ENCOUNTER SYMPTOMS
ALLERGIC/IMMUNOLOGIC NEGATIVE: 1
SORE THROAT: 0
BACK PAIN: 0
EYE DISCHARGE: 0
CONSTIPATION: 0
DIARRHEA: 0
SHORTNESS OF BREATH: 0
VOMITING: 0
EYE PAIN: 0
COUGH: 0
ABDOMINAL PAIN: 1
NAUSEA: 0

## 2019-05-11 ASSESSMENT — PAIN DESCRIPTION - FREQUENCY: FREQUENCY: CONTINUOUS

## 2019-05-11 ASSESSMENT — PAIN DESCRIPTION - DESCRIPTORS: DESCRIPTORS: PATIENT UNABLE TO DESCRIBE

## 2019-05-11 ASSESSMENT — PAIN DESCRIPTION - LOCATION: LOCATION: GENERALIZED

## 2019-05-11 NOTE — PROGRESS NOTES
[facility] -   Infectious diseases evaluation  admission date 4/11/2019    reason for consultation:   Sepsis    Impression :   Current:  · Acute cholecystitis status post cholecystostomy tube 4/22, follow with candida non-albicans and 1:8 capsule ESBL  · Treated E. coli. Emphysematous cystitis  · Treated right lower lobe pneumonia  · Sputum colonized with yeast  · 5/10/19. Bilateral lower lobe aspiration pneumonia  · 5/10/19. Acute leukocytosis 28  · 5/10/19, respiratory distress on BiPAP  · Elevated CRP  · Leukocytosis with bandemia    Other:  ·   Discussion / summary of stay / plan of care   · Cholecystitis status post cholecystostomy tube placement on 4/22 were growing candida non-albicans with one colony of ESBL Klebsiella. Finished a course of antibiotics  · E. coli cystitis considered to be emphysematous. According to the imaging. Treated  · Aspiration pneumonia right lower lobe. Treated  · Urinary retention  · 5/10/19. Acute episode of sepsis, septic shock on pressors, leukocytosis up to 28th, picture of small bowel obstruction with abdominal pain and distention. Transferred to ICU on BiPAP, questionable aspiration  · Was on ceftriaxone and Flagyl, switched on 5/10 to meropenem and vancomycin  ·   Recommendations   · Started on meropenem and Vanco on 5/10. Slight improvement in leukocytosis since the switch. She was on ceftriaxone and Flagyl, which were stopped and 5/9  · Await cultures to better decide for antibiotics  · General surgery suggesting open laparotomy for exploration for SBO  · Discussed with patient, RN.     Infection Control Recommendations   · Olds Precautions  · Contact Isolation     Antimicrobial Stewardship Recommendations   · Simplification of therapy  · Targeted therapy    Coordination ofOutpatient Care:   · Estimated Length of IV antimicrobials:  · Patient will need Midline / picc Catheter Insertion:   · Patient will need bone appearance     CVA (cerebral vascular accident) ([address]     left  side weakness    GERD (gastroesophageal reflux disease)     Hypertension     Paraproteinemia     Weight loss        Past Surgical  History:     Past Surgical History:   Procedure Laterality Date    INTUBATION  4/14/2019         PACEMAKER PLACEMENT  07/2011    Pacemaker is Medtronic Revo (compatible). Leads placed in 1995 are NOT MRI compatible. Placed at [address] V's per Dr. Steven King can not have an MRI.  UPPER GASTROINTESTINAL ENDOSCOPY N/A 4/16/2019    EGD ESOPHAGOGASTRODUODENOSCOPY @ BEDSIDE  ICU 2002 performed by Rd Wilcox MD at [address] [place] [address] OR       Medications:      metoprolol  5 mg Intravenous Q12H    pantoprazole  40 mg Intravenous Daily    And    sodium chloride (PF)  10 mL Intravenous Daily    alteplase  1 mg Intracatheter Once    levalbuterol  1.25 mg Nebulization Q4H    ipratropium  0.5 mg Nebulization Q4H    heparin (porcine)  5,000 Units Subcutaneous BID    meropenem  1 g Intravenous Q8H    vancomycin  750 mg Intravenous Q12H    vancomycin (VANCOCIN) intermittent dosing (placeholder)   Other RX Placeholder    mometasone-formoterol  2 puff Inhalation BID    [Held by provider] pantoprazole  40 mg Oral QAM AC    [Held by provider] metoprolol tartrate  12.5 mg Oral BID    fat emulsion  100 mL Intravenous Daily    magnesium sulfate  1 g Intravenous Once    insulin lispro  0-12 Units Subcutaneous Q6H    venlafaxine  37.5 mg Oral TID    aspirin  81 mg Oral Daily    sodium chloride flush  10 mL Intravenous 2 times per day       Social History:     Social History     Socioeconomic History    Marital status:       Spouse name: Not on file    Number of children: Not on file    Years of education: Not on file    Highest education level: Not on file   Occupational History    Not on file   Social Needs    Financial resource strain: Not on file    Food insecurity:     Worry: Not on file     Inability: Not on file   Jamn needs:     Medical: Not on file     Non-medical: Not on file   Tobacco Use    Smoking status: Current Some Day Smoker     Packs/day: 1.00     Years: 30.00     Pack years: 30.00    Smokeless tobacco: Never Used   Substance and Sexual Activity    Alcohol use: Not Currently     Alcohol/week: 16.8 oz     Types: 28 Glasses of wine per week    Drug use: No    Sexual activity: Not on file   Lifestyle    Physical activity:     Days per week: Not on file     Minutes per session: Not on file    Stress: Not on file   Relationships    Social connections:     Talks on phone: Not on file     Gets together: Not on file     Attends Yarsanism service: Not on file     Active member of club or organization: Not on file     Attends meetings of clubs or organizations: Not on file     Relationship status: Not on file    Intimate partner violence:     Fear of current or ex partner: Not on file     Emotionally abused: Not on file     Physically abused: Not on file     Forced sexual activity: Not on file   Other Topics Concern    Not on file   Social History Narrative    Not on file       Family History:   History reviewed. No pertinent family history. Allergies:   Penicillins and Amoxicillin     Review of Systems:     Review of Systems   Constitutional: Negative for chills and fever. HENT: Negative for congestion and sore throat. Eyes: Negative for pain and discharge. Respiratory: Negative for cough and shortness of breath. Cardiovascular: Negative for chest pain and palpitations. Gastrointestinal: Positive for abdominal pain. Negative for nausea and vomiting. Endocrine: Negative. Genitourinary: Negative for dysuria and flank pain. Musculoskeletal: Negative for back pain and myalgias. Skin: Negative for rash and wound. Allergic/Immunologic: Negative. Neurological: Negative for headaches. Hematological: Negative. Psychiatric/Behavioral: Positive for agitation.  Negative for confusion. Physical Examination :     Patient Vitals for the past 8 hrs:   BP Temp Temp src Pulse Resp SpO2 Weight   05/11/19 0915 (!) 84/47 -- -- 70 20 -- --   05/11/19 0900 (!) 82/42 -- -- 74 17 -- --   05/11/19 0845 (!) 114/49 -- -- 83 23 -- --   05/11/19 0830 (!) 79/40 -- -- 77 20 -- --   05/11/19 0815 104/68 -- -- 84 20 -- --   05/11/19 0800 106/63 97.5 °F (36.4 °C) Axillary 87 21 -- --   05/11/19 0745 (!) 75/37 -- -- 81 20 -- --   05/11/19 0730 124/62 -- -- 87 21 -- --   05/11/19 0715 (!) 133/55 -- -- 89 20 -- --   05/11/19 0700 (!) 140/73 -- -- 86 27 -- --   05/11/19 0657 -- -- -- -- 28 100 % --   05/11/19 0645 (!) 79/46 -- -- 78 28 -- --   05/11/19 0630 (!) 94/51 -- -- 80 23 -- --   05/11/19 0615 (!) 109/54 -- -- 85 23 100 % --   05/11/19 0600 (!) 122/57 -- -- 87 24 100 % --   05/11/19 0545 -- -- -- 79 22 100 % --   05/11/19 0530 (!) 116/27 -- -- 77 20 100 % --   05/11/19 0515 129/67 -- -- 89 24 100 % --   05/11/19 0500 102/69 -- -- 77 22 100 % --   05/11/19 0445 (!) 96/26 -- -- 76 21 100 % --   05/11/19 0430 126/84 -- -- 85 16 100 % --   05/11/19 0425 -- -- -- -- 22 100 % --   05/11/19 0423 -- -- -- -- 18 -- --   05/11/19 0415 (!) 102/51 -- -- 75 22 100 % --   05/11/19 0400 132/77 96.9 °F (36.1 °C) Axillary 78 20 100 % --   05/11/19 0345 123/67 -- -- 81 23 100 % 83 lb 9.6 oz (37.9 kg)       Physical Exam   Constitutional: She is oriented to person, place, and time. She appears well-developed and well-nourished. No distress. HENT:   Head: Normocephalic and atraumatic. Mouth/Throat: No oropharyngeal exudate. Tongue dry. Eyes: Pupils are equal, round, and reactive to light. Conjunctivae and EOM are normal.   Neck: Normal range of motion. No JVD present. No tracheal deviation present. Cardiovascular: Normal rate, regular rhythm and normal heart sounds. Pulmonary/Chest: Effort normal and breath sounds normal. No respiratory distress. Abdominal: Soft. She exhibits distension.  There is tenderness. BSS absent. NG to suction. C/o RUQ pain to palpation. Genitourinary:   Genitourinary Comments: No dyer. Musculoskeletal: Normal range of motion. She exhibits no edema or deformity. Neurological: She is alert and oriented to person, place, and time. L-sided weakness from stroke. Skin: Skin is warm and dry. Capillary refill takes less than 2 seconds. No rash noted. Psychiatric: She has a normal mood and affect. Her behavior is normal. Judgment and thought content normal.   Nursing note and vitals reviewed. Medical Decision Making:   I have independently reviewed/ordered the following labs:    CBC with Differential:   Recent Labs     05/10/19  0657  05/11/19  0506 05/11/19  0818   WBC 28.5*  --  21.4*  --    HGB 12.7   < > 10.7* 9.8*   HCT 39.9   < > 33.8* 31.0*     --  318  --    LYMPHOPCT  --   --  2*  --    MONOPCT  --   --  8*  --     < > = values in this interval not displayed. BMP:  Recent Labs     05/10/19  0657  05/11/19  0400 05/11/19  0818   *   < > 146* 151*   K 4.8   < > 3.6* 3.2*   *   < > 114* 118*   CO2 22   < > 19* 23   BUN 36*   < > 40* 41*   CREATININE 0.55   < > 0.42* <0.40*   MG 2.5  --  2.2  --     < > = values in this interval not displayed. Hepatic Function Panel:   Recent Labs     05/09/19  0528 05/11/19  0400   PROT 5.3* 5.9*   LABALBU 2.0* 1.9*   BILIDIR  --  0.21   IBILI  --  0.17   BILITOT 0.64 0.38   ALKPHOS 174* 196*   ALT 13 18   AST 16 19     No results for input(s): RPR in the last 72 hours. No results for input(s): HIV in the last 72 hours. No results for input(s): BC in the last 72 hours. Lab Results   Component Value Date    CREATININE <0.40 05/11/2019    GLUCOSE 134 05/11/2019    GLUCOSE 87 05/21/2012       Detailed results: Thank you for allowing us to participate in the care of this patient. Please call with questions.     This note is created with the assistance of a speech recognition program.  While

## 2019-05-11 NOTE — FLOWSHEET NOTE
05/1950   Provider Notification   Reason for Communication Review case   Provider Name [facility]   Provider Notification Physician   Method of Communication Call   Response See orders   Notification Time 1950   Notified  [facility] that pt is at maximum dose of Precedex and still out of control. New orders received.

## 2019-05-11 NOTE — FLOWSHEET NOTE
05/11/19 1240   Encounter Summary   Services provided to: Patient   Referral/Consult From: Palliative Care   Complexity of Encounter Low   Length of Encounter 15 minutes   Routine   Type Follow up   Spiritual/Presybeterian   Type Spiritual support   Assessment Unable to respond   Intervention Prayer

## 2019-05-11 NOTE — PROGRESS NOTES
Patient limited by pain  Activity Tolerance: Pt refuses further PROM      Assessment  Activity Tolerance: Patient limited by pain   Body structures, Functions, Activity limitations: Decreased functional mobility ; Decreased ADL status; Decreased strength; Increased Pain  Assessment: Pt would benefit from additional PT to increase strength, endurance, and indpendence with functional mobility. Specific instructions for Next Treatment: progress to mobility as able  Prognosis: Good  Discharge Recommendations: ECF with PT     Type of devices: Call light within reach; Left in bed     Plan  Times per week: 5-7x/wk  Times per day: Daily  Current Treatment Recommendations: ROM, Strengthening, Functional Mobility Training, Transfer Training    Patient Education  New Education Provided: POC, bed mobility, breathing technique.    Learner:patient  Method: explanation       Outcome: needs reinforcement     Goals  Short term goals  Time Frame for Short term goals: 10 visits  Short term goal 1: improve strength RUE/RLE to 4/5 to assist in bed mobility and transfers  Short term goal 2: improve bed mobility to minx1  Short term goal 3: improve transfers to minx1  Short term goal 4: progress to Gait and/or use of wheelchair for mobility    PT Individual Minutes  Time In: (P) 1441  Time Out: (P) 1508  Minutes: (P) 27    Electronically signed by Saul Davis PTA on 5/11/19 at 3:14 PM         05/11/19 1513   PT Individual Minutes   Time In 1441   Time Out 1508   Minutes 27

## 2019-05-11 NOTE — PROGRESS NOTES
Dr Delila Fabry talked with patient who is at this time alert and oriented and refuses surgery and wants to remain a full code. Patient's brother called so patient could talk to him, message was left on voice mail to return call to [facility].

## 2019-05-11 NOTE — PROGRESS NOTES
[address] [facility].    PROGRESS NOTE             5/11/2019    7:25 AM    Name:   Di Bah  MRN:     [id number]     Acct:      [de-identified]   Room:   2006/2006-01  IP Day:  27  Admit Date:  4/11/2019  2:48 PM    PCP:  Maria C Cisse DO  Code Status:  Limited    Subjective:     C/C:   Chief Complaint   Patient presents with    Abdominal Pain     Interval History Status: improved. Patient seen bedside in ICU. Pt was transferred to ICU yesterday for acute hypoxic resp failure. No acute events overnight. RR 20's, Multiple episodes of hypotension overnight. BP stable this am.  On levophed drip. Off BiPAP this am. Alert and oriented x3. On meropenem and vancomycin. Gen surg to plan for laparotomy - consent per brother. Brief History:     Amy Amaral is a 77 y.o. female who was admitted for the management of  sepsis 2/2 UTI and pneumonia. Pt developed cholecystitis, GI bleed, SBO, ileus, refusing surgical intervention. Review of Systems:     Review of Systems   Constitutional: Positive for fatigue. Negative for chills and fever. Eyes: Negative for visual disturbance. Respiratory: Negative for shortness of breath. Cardiovascular: Negative for chest pain, palpitations and leg swelling. Gastrointestinal: Positive for abdominal pain. Negative for constipation, diarrhea, nausea and vomiting. Genitourinary: Negative for dysuria. Musculoskeletal: Negative for myalgias. Neurological: Positive for weakness. Negative for light-headedness and headaches. Medications: Allergies:     Allergies   Allergen Reactions    Penicillins Shortness Of Breath and Rash    Amoxicillin        Current Meds:   Scheduled Meds:    alteplase  1 mg Intracatheter Once    levalbuterol  1.25 mg Nebulization Q4H    ipratropium  0.5 mg Nebulization Q4H    heparin (porcine)  5,000 Units Subcutaneous BID    meropenem  1 g Intravenous Q8H    vancomycin  750 mg Intravenous Q12H    vancomycin (VANCOCIN) intermittent dosing (placeholder)   Other RX Placeholder    oxymetazoline  2 spray Each Nare BID    mometasone-formoterol  2 puff Inhalation BID    pantoprazole  40 mg Oral QAM AC    metoprolol tartrate  12.5 mg Oral BID    fat emulsion  100 mL Intravenous Daily    magnesium sulfate  1 g Intravenous Once    insulin lispro  0-12 Units Subcutaneous Q6H    venlafaxine  37.5 mg Oral TID    aspirin  81 mg Oral Daily    sodium chloride flush  10 mL Intravenous 2 times per day     Continuous Infusions:    dexmedetomidine (PRECEDEX) IV infusion 0.6 mcg/kg/hr (05/11/19 0520)    norepinephrine 1 mcg/min (05/11/19 0636)    PN-Adult 2-in-1 Central Line (Standard) 65 mL/hr at 05/10/19 1921    dextrose       PRN Meds: diatrizoate meglumine-sodium, sodium chloride, sodium chloride flush, morphine, LORazepam, ondansetron, zolpidem, sodium chloride flush, hydrOXYzine, metoprolol, sodium chloride nebulizer, fentanNYL, oxyCODONE-acetaminophen, magnesium sulfate, sodium phosphate IVPB **OR** sodium phosphate IVPB, potassium chloride **OR** potassium alternative oral replacement **OR** potassium chloride, glucose, dextrose, glucagon (rDNA), dextrose, milk and molasses, sodium chloride flush, acetaminophen    Data:     Past Medical History:   has a past medical history of Abnormal computed tomography of cervical spine, CVA (cerebral vascular accident) ([address] GERD (gastroesophageal reflux disease), Hypertension, Paraproteinemia, and Weight loss. Social History:   reports that she has been smoking. She has a 30.00 pack-year smoking history. She has never used smokeless tobacco. She reports that she drank about 16.8 oz of alcohol per week. She reports that she does not use drugs. Family History: History reviewed. No pertinent family history.     Vitals:  BP (!) 94/51   Pulse 80   Temp 96.9 °F (36.1 °C) (Axillary)   Resp 28   Ht 5' (1.524 m)   Wt 83 lb 9.6 oz (37.9 kg)   SpO2 100%   BMI 16.33 kg/m²   Temp (24hrs), Av.8 °F (36.6 °C), Min:96.9 °F (36.1 °C), Max:98.8 °F (37.1 °C)    Recent Labs     05/10/19  1741 05/10/19  1957 19  0019 19  0523   POCGLU 192* 93 148* 119*       I/O(24Hr): Intake/Output Summary (Last 24 hours) at 2019 0725  Last data filed at 2019 0544  Gross per 24 hour   Intake 1416.72 ml   Output 1275 ml   Net 141.72 ml       Labs:    [unfilled]    Lab Results   Component Value Date/Time    SPECIAL right hand 1cc 05/10/2019 10:18 AM     Lab Results   Component Value Date/Time    CULTURE NO GROWTH 10 HOURS 05/10/2019 10:18 AM       [unfilled]    Radiology:    Ct Abdomen Pelvis Wo Contrast Additional Contrast? Oral    Result Date: 2019  EXAMINATION: CT OF THE ABDOMEN AND PELVIS WITHOUT CONTRAST 2019 7:34 pm TECHNIQUE: CT of the abdomen and pelvis was performed without the administration of intravenous contrast. Multiplanar reformatted images are provided for review. Dose modulation, iterative reconstruction, and/or weight based adjustment of the mA/kV was utilized to reduce the radiation dose to as low as reasonably achievable. COMPARISON: 2019 HISTORY: ORDERING SYSTEM PROVIDED HISTORY: ABDOMINAL PAIN TECHNOLOGIST PROVIDED HISTORY: Water soluble contrast only please Ordering Physician Provided Reason for Exam: Abdominal pain - Vented patient. Contrast given via nurse through NG tube. Acuity: Unknown Type of Exam: Unknown Relevant Medical/Surgical History: Hx - Sepsis due to urinary tract infection. FINDINGS: Lower Chest: New moderate layering bilateral pleural effusions with bilateral lower lobe atelectasis. Organs: Limited evaluation due lack of intravenous contrast.  Cholelithiasis redemonstrated. No gallbladder wall thickening or biliary ductal dilatation.  Scattered tiny hypodense lesions in the liver are too small to characterize but statistically represent benign cysts or pacemaker enters from the left with intact leads in appropriate positions. Heart size is stable, top-normal.  Lung fields are hyperinflated suggestive of COPD. Interstitial markings are coarsened, similar to that noted in 2015 likely chronic interstitial change. There is improved aeration at the left lung base with slight blunting of the left costophrenic angle suggesting effusion. There is been interval clearing of right basilar opacities likely resolved atelectasis. Osseous and mediastinal structures are age-appropriate. No vascular congestion or extrapleural air is noted. Improved aeration of both lung bases with resolved right basilar opacity. Mild left basilar opacity persists with blunting of the left costophrenic angle with small effusion on the left suspected. Findings of COPD and suspected chronic fibrotic change are noted. No extrapleural air. Tubes and lines as above. Xr Chest (single View Frontal)    Result Date: 5/2/2019  EXAMINATION: SINGLE XRAY VIEW OF THE CHEST 5/2/2019 6:34 am COMPARISON: 29 April 2019 HISTORY: ORDERING SYSTEM PROVIDED HISTORY: COPD TECHNOLOGIST PROVIDED HISTORY: COPD Ordering Physician Provided Reason for Exam: COPD Acuity: Acute Type of Exam: Initial FINDINGS: AP portable view of the chest time stamped at 630 hours demonstrates stable cardiac size. Overlying cardiac monitoring electrodes are present. Right-sided PICC line terminates in the right atrium. Bipolar pacemaker enters from the left with intact leads in appropriate positions. There is been no significant interval change in bilateral interstitial opacities, representing interval change since 2015. This may be related to worsening interstitial disease or superimposed venous congestion. Bilateral effusions are noted with bibasilar opacities, either atelectasis or edema. Continued bilateral effusions, bibasilar opacities and bilateral interstitial opacities in the upper lobes.   Findings may be related to worsening interstitial disease and edema. Airspace disease is not excluded. Xr Chest (single View Frontal)    Result Date: 4/13/2019  EXAMINATION: SINGLE XRAY VIEW OF THE CHEST 4/13/2019 7:18 am COMPARISON: April 12, 2019 HISTORY: ORDERING SYSTEM PROVIDED HISTORY: dyspnea TECHNOLOGIST PROVIDED HISTORY: dyspnea Ordering Physician Provided Reason for Exam: dyspnea Acuity: Acute Type of Exam: Subsequent/Follow-up Additional signs and symptoms: dyspnea FINDINGS: A right IJ catheter is seen with its tip terminating at the superior cavoatrial junction. The left chest wall pacemaker and leads are stable. The cardiomediastinal silhouette is stable. There is interval increased opacity at the right lung base, may be related to atelectasis versus pneumonia. There is small atelectasis and mild pleural effusion at the left lung base. There is no pneumothorax. There is no acute osseous abnormality. Interval increased opacity at the right lung base, may be related to atelectasis versus pneumonia. Small atelectasis and mild pleural effusion at the left lung, new since the prior study. Xr Chest Standard (2 Vw)    Result Date: 4/29/2019  EXAMINATION: TWO VIEWS OF THE CHEST 4/29/2019 8:04 pm COMPARISON: 04/27/2019 HISTORY: ORDERING SYSTEM PROVIDED HISTORY: Follow-up lung infiltrate TECHNOLOGIST PROVIDED HISTORY: Follow-up lung infiltrate Ordering Physician Provided Reason for Exam: Follow-up infiltrate. Pt unable to lean forward - unable to get proper sponge behind pt for lateral. Pt unable to raise left arm at all for lateral. Best possible lateral obtained. Acuity: Unknown Type of Exam: Unknown Additional signs and symptoms: Follow-up infiltrate. Pt unable to lean forward - unable to get proper sponge behind pt for lateral. Pt unable to raise left arm at all for lateral. Best possible lateral obtained. FINDINGS: Left chest cardiac pacemaker device is in place.   Right IJ central venous catheter in place with compared to last image from earlier small bowel series which likely relates to suctioning of the contrast.  Majority of previously seen contrast within the pelvis likely in the rectum is no longer visualized and has likely passed through. Residual contrast remains within the colon and small bowel. NG tube is in place with side hole in the region of the GE junction. Partially visualized mild left pleural effusion associated atelectasis. 1. Interval presumed suctioning of contrast from the stomach which now appears relatively empty. Continued progression of contrast through the small and large bowel as described. 2. NG tube side hole at the level of the GE junction, consider advancement of 2-3 cm. Xr Abdomen (kub) (single Ap View)    Result Date: 5/8/2019  EXAMINATION: SINGLE SUPINE XRAY VIEW(S) OF THE ABDOMEN 5/8/2019 8:59 am COMPARISON: CT abdomen from 05/05/2019, and KUB from 04/19/2019 HISTORY: ORDERING SYSTEM PROVIDED HISTORY: recheck obstruction TECHNOLOGIST PROVIDED HISTORY: recheck obstruction Ordering Physician Provided Reason for Exam: recheck obstruction Acuity: Unknown Type of Exam: Unknown FINDINGS: Again noted cholecystostomy tube, electrode leads from a pacemaker overlying the heart, and overlying electrode leads/tubing. NG tube no longer demonstrated. Gas-filled bowel loops without marked dilatation; cecum measures 8 cm, slightly increased as compared to the previous study. No obvious free air. No mass or organomegaly. Bones unchanged. Retrocardiac opacity, hyperinflated lungs and probable pleural effusions. NG tube removed. Gas-filled bowel more suggestive ileus; no clear cut obstruction. Cecum measures 8 cm. Cholecystostomy tube in unchanged position. Additional unchanged findings, as above.  RECOMMENDATION: Continued clinical correlation and follow-up     Xr Abdomen (kub) (single Ap View)    Result Date: 4/19/2019  EXAMINATION: SINGLE SUPINE XRAY VIEW(S) OF THE ABDOMEN 4/19/2019 9:48 am COMPARISON: April 14, 2019 HISTORY: ORDERING SYSTEM PROVIDED HISTORY: pain TECHNOLOGIST PROVIDED HISTORY: pain Ordering Physician Provided Reason for Exam: Abdominal pain and distentsion Acuity: Acute Type of Exam: Initial Additional signs and symptoms: Abdominal pain and distentsion FINDINGS: Enteric tube with the tip within the stomach. Small left pleural effusion. Minimal right pleural effusion. Mildly dilated loops of bowel. Nonspecific bowel gas pattern with mildly dilated loops of bowel. Xr Abdomen (kub) (single Ap View)    Result Date: 4/14/2019  EXAMINATION: SINGLE SUPINE XRAY VIEW(S) OF THE ABDOMEN 4/14/2019 7:39 am COMPARISON: CT abdomen and pelvis  film from 11 April 2019 HISTORY: [address] [facility] [place] [address]: Abd Distention TECHNOLOGIST PROVIDED HISTORY: Abd Distention Ordering Physician Provided Reason for Exam: Abdominal distention. Pt was moving around in the bed. Best films at present time. Acuity: Acute Type of Exam: Initial Additional signs and symptoms: Abdominal distention. Pt was moving around in the bed. Best films at present time. FINDINGS: Portable view time stamped at 748 hours demonstrates an intestinal tube terminating in the midportion of a gaseous Spike dilated stomach. Densities are present over the stomach likely medication. Bipolar pacemaker is in situ with intact leads. Heart size is top-normal, stable. Gaseous distension of the stomach and loop of bowel in the upper mid abdomen is noted but there is gas and fecal material in the rectum. Gastric outlet obstruction or proximal small bowel partial obstruction is suspected. No free air is noted. Midline city is present over the pelvis likely a monitor or CT small bore catheter. Vascular calcification is present in the pelvis. Persistent preferential gaseous distension of the stomach although there is some gas in the upper abdomen and gas and fecal material present in the rectosigmoid. Findings suggest gastric outlet obstruction. Ct Abdomen W Contrast Additional Contrast? Radiologist Recommendation    Result Date: 5/5/2019  EXAMINATION: CT ABDOMEN WITH CONTRAST, 5/5/2019 3:38 pm TECHNIQUE: CT of the abdomen was performed with the administration of intravenous contrast. Multiplanar reformatted images are provided for review. Dose modulation, iterative reconstruction, and/or weight based adjustment of the mA/kV was utilized to reduce the radiation dose to as low as reasonably achievable. COMPARISON: April 14, 2019 HISTORY: ORDERING SYSTEM PROVIDED HISTORY: Check for abscess/fluid collection around ashley tube site. TECHNOLOGIST PROVIDED HISTORY: Ordering Physician Provided Reason for Exam: Patient c/o abd pain around her ashley site and drainage tube. FINDINGS: Evaluation is limited by motion. Lower Chest: Moderate bilateral pleural effusions. Organs: Multiple liver hypodensities measuring up to 4 mm are incompletely characterized but in the absence of risk factors likely represent cysts requiring no specific follow-up. Cholecystostomy tube is in place. No adjacent focal drainable fluid collection. No pancreatic lesion. No splenomegaly. No adrenal lesion. No hydronephrosis. No renal lesion. GI/Bowel: Stomach is markedly distended. Swirled appearance of the mesentery is seen within the mid to lower abdomen with adjacent thickened loops of small bowel. Peritoneum/Retroperitoneum: Atherosclerotic calcification of the abdominal aorta without aneurysmal dilatation. No adenopathy. Bones/Soft Tissues: No acute soft tissue abnormality. Diffuse osteopenia. Lumbar spine degenerative changes. Bowel obstruction which is suspected to be caused by an internal hernia. Cholecystostomy tube is in place. No surrounding fluid collection.      Nm Gi Blood Loss    Result Date: 5/3/2019  EXAMINATION: NUCLEAR MEDICINE GASTRIC BLEEDING STUDY 5/3/2019 TECHNIQUE: Following the intravenous injection of 23.8 mCi of 99 mTc-labeled RBC's, a flow study and standard images of the abdomen was obtained over a total period of 60 minutes COMPARISON: None. HISTORY: ORDERING SYSTEM PROVIDED HISTORY: GI BLEEDING TECHNOLOGIST PROVIDED HISTORY: Ordering Physician Provided Reason for Exam: GI bleeding Acuity: Unknown Type of Exam: Unknown FINDINGS: Activity is seen within the blood pool, including the great vessels, heart, liver, and spleen. There was no abnormal accumulation of radioactivity in the abdomen to suggest active bleeding during study acquisition. No evidence of active GI bleeding during acquisition. RECOMMENDATIONS: If the patient shows hemodynamic signs of an active bleed in the next 20 hours, additional images can be acquired. Ct Abdomen Pelvis W Iv Contrast    Result Date: 4/11/2019  EXAMINATION: CT OF THE ABDOMEN AND PELVIS WITH CONTRAST 4/11/2019 5:14 pm TECHNIQUE: CT of the abdomen and pelvis was performed with the administration of intravenous contrast. Multiplanar reformatted images are provided for review. Dose modulation, iterative reconstruction, and/or weight based adjustment of the mA/kV was utilized to reduce the radiation dose to as low as reasonably achievable. COMPARISON: None. HISTORY: ORDERING SYSTEM PROVIDED HISTORY: Abdominal pain TECHNOLOGIST PROVIDED HISTORY: IV Only Contrast Ordering Physician Provided Reason for Exam: patient c/o abd pain for an hour FINDINGS: Lower Chest: Trace pleural fluid bilaterally is noted. Indwelling cardiac pacemaker is present. Heart size is normal.  Coronary artery calcifications are evident. The esophagus is significantly dilated and fluid-filled. No paraesophageal adenopathy is evident. Organs: The spleen, pancreas, and adrenals are unremarkable. The liver contains hypodensities, likely cysts. The gallbladder is distended and contains a solitary gallstone. The kidneys excrete contrast bilaterally. Extrarenal collecting systems are noted.   The ureters are mildly dilated down to the urinary bladder without evidence of intraluminal or ureteral obstructing calculi. GI/Bowel: Marked distention of the stomach is noted; this continues to the pylorus with appearance suggesting partial gastric outlet obstruction. Fluid is present in some small bowel loops and colon distal to the stomach. No small bowel obstruction. Pelvis: Marked distention of the urinary bladder is noted. 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 Distended ureters may be related to reflux or infection. No free pelvic fluid, pelvic or inguinal adenopathy is noted. Peritoneum/Retroperitoneum: No aortic aneurysm. Mild to moderate plaque is noted in the infrarenal abdominal aorta and both proximal iliac arteries. Shotty lymph nodes are present around the aorta in the upper abdomen. No mesenteric adenopathy is noted. Bones/Soft Tissues: Degenerative changes are present in the hips and lower lumbar facets. Estimated biologic radiation dose for this procedure:258.77 mGy/cm2.     1. Dilatation of the thoracic esophagus filled with fluid. No obstructive process is noted. No adjacent enlarged lymph nodes. 2. Trace pleural fluid. 3. Marked distention of the stomach. This appears to extend to the pylorus. Partial gastric outlet obstruction is not excluded. 4. Bilateral dilated ureters without obstructing calculi. Urinary bladder is markedly distended with bladder wall air suggesting emphysematous cystitis. Dilatation of the ureters may be related to reflux or infection. 5. Atherosclerotic disease. 6. Other findings as above. Critical results were called by Dr. Citlaly Reid MD to [address] on 4/11/2019 at 17:37. Ir Nahun Tyra Device Plmt/replace/removal    Result Date: 4/30/2019  PROCEDURE: ULTRASOUND GUIDED VASCULAR ACCESS. FLUOROSCOPY GUIDED PICC PLACEMENT 4/30/2019.  HISTORY: ORDERING SYSTEM PROVIDED HISTORY: TPN TECHNOLOGIST PROVIDED HISTORY: TPN Low o2 sat Acuity: Acute Type of Exam: Initial FINDINGS: Hyperexpanded right lung volume. Left greater than right basilar heterogeneous opacities with left basilar consolidation. Small bilateral pleural effusions. Stable cardiomediastinal silhouette and great vessels. Enteric contrast in the stomach and distal esophagus. Enteric tube courses into the stomach but tip is not definitely seen due to contrast in the stomach. Stable left pectoral cardiac pacer device. Stable right PICC. Left greater than right basilar heterogeneous opacities with left basilar consolidation. Differential considerations include atelectasis and an infectious/inflammatory process. Small bilateral pleural effusions. Enteric contrast in the stomach and distal esophagus. Enteric tube courses into the stomach but tip is not definitely seen due to contrast in the stomach. Xr Chest Portable    Result Date: 4/27/2019  EXAMINATION: SINGLE XRAY VIEW OF THE CHEST 4/27/2019 8:47 am COMPARISON: 04/26/2019 HISTORY: ORDERING SYSTEM PROVIDED HISTORY: Assess for pulm edema vs PNA TECHNOLOGIST PROVIDED HISTORY: Assess for pulm edema vs PNA Ordering Physician Provided Reason for Exam: cough, congestion Acuity: Acute Type of Exam: Initial FINDINGS: Right IJ central venous catheter is in place tip in the SVC right atrial junction. Pacer wires are in place. The heart and mediastinal structures are stable. Pleural effusions are present with bibasilar atelectasis. Bilateral lung infiltrates are present in the upper lung fields. Persistent pleural effusions with bibasilar atelectasis and bilateral lung infiltrates with areas of consolidation developing in the upper lobes.      Xr Chest Portable    Result Date: 4/26/2019  EXAMINATION: SINGLE XRAY VIEW OF THE CHEST 4/26/2019 6:51 am COMPARISON: April 24, 2019 HISTORY: ORDERING SYSTEM PROVIDED HISTORY: Pleural effusions TECHNOLOGIST PROVIDED HISTORY: Pleural effusions Ordering Physician Provided Reason for Exam: pleural effusion Acuity: Acute Type of Exam: Initial FINDINGS: Right IJ line in good position. Pacer device is stable. Cardiac leads overlie the chest.  Stable cardiomediastinal contours with calcified aortic arch. Left effusion and associated airspace disease, slightly decreased. Chronic blunting of right costophrenic sulcus may represent scarring or chronic effusion. Increased reticular markings right upper chest and left upper chest possibly interstitial edema or interstitial pneumonia. No new airspace consolidation. Increasing reticular markings upper chest bilaterally right greater than left possibly due to edema or interstitial pneumonitis. Improving left effusion with associated retrocardiac airspace disease. Pleural-parenchymal scarring or effusion in the right lung base, stable. Xr Chest Portable    Result Date: 4/24/2019  EXAMINATION: SINGLE XRAY VIEW OF THE CHEST 4/24/2019 6:05 am COMPARISON: Chest radiograph dated 04/22/2019 HISTORY: ORDERING SYSTEM PROVIDED HISTORY: Acute respiratory failure, pleural effusion TECHNOLOGIST PROVIDED HISTORY: Acute respiratory failure, pleural effusion Ordering Physician Provided Reason for Exam: pleural effusion, respiratory failure. Acuity: Acute Type of Exam: Initial FINDINGS: Heart size is normal.  Dual-chamber pacemaker placed via left subclavian approach. Right internal jugular central line has tip in distal superior vena cava. Interval removal of nasogastric tube. No interval change of infiltrate and atelectasis at the left lung base. Stable mild infiltrate in the left upper lobe. Mild interval improvement of infiltrate at the right lung base. Stable small bilateral pleural effusions, left larger than right. Mild CHF, stable. 1.  No interval change of infiltrate and atelectasis at the left lung base. Stable mild infiltrate in the left upper lobe. 2.  Mild interval improvement of infiltrate at the right lung base.  3.  Stable small bilateral pleural effusions, left larger than right. 4.  Mild CHF, stable. Xr Chest Portable    Result Date: 4/22/2019  EXAMINATION: SINGLE XRAY VIEW OF THE CHEST 4/22/2019 6:44 am COMPARISON: April 21, 2019. HISTORY: ORDERING SYSTEM PROVIDED HISTORY: Hypoxia and CHF TECHNOLOGIST PROVIDED HISTORY: Hypoxia and CHF Ordering Physician Provided Reason for Exam: hx of hypoxia/CHF Acuity: Acute Type of Exam: Initial FINDINGS: Right IJ central venous catheter appears in unchanged position. Nasogastric tube courses below the diaphragm, with tip not imaged. Left chest wall pacemaker device projects in unchanged position. Cardiac and mediastinal contours are enlarged but unchanged. Unchanged bilateral pleural effusions and bibasilar pulmonary opacities. Unchanged findings suggestive of congestive heart failure. No evidence of pneumothorax. No new osseous abnormalities. 1. No significant change in findings suggestive of congestive heart failure. 2. Unchanged bilateral pleural effusions and bibasilar pulmonary consolidations. RECOMMENDATION: Radiographic follow-up to complete resolution. Xr Chest Portable    Result Date: 4/21/2019  EXAMINATION: SINGLE XRAY VIEW OF THE CHEST 4/21/2019 3:08 pm COMPARISON: April 19, 2019 HISTORY: ORDERING SYSTEM PROVIDED HISTORY: hypoxia TECHNOLOGIST PROVIDED HISTORY: hypoxia Ordering Physician Provided Reason for Exam: hypoxia Acuity: Unknown Type of Exam: Unknown FINDINGS: Enteric tube in the stomach. ET tube is been removed. Right IJ catheter terminates in the atrial caval junction. Bipolar pacer on the left unchanged. Heart and mediastinum unremarkable. Moderate edema unchanged. Small effusions, left greater than right. Bony thorax intact. Status post extubation. Life support apparatus otherwise stable. Moderate edema and effusions unchanged.      Xr Chest Portable    Result Date: 4/19/2019  EXAMINATION: SINGLE XRAY VIEW OF THE CHEST 4/19/2019 5:51 am Intestinal tube extends beyond the body of the stomach, tip not included on the exam.  Bipolar pacemaker enters from the left with intact leads in appropriate positions. Heart size is normal.  Left pleural effusion is noted there is continued volume loss in the left hemithorax with stable mild vascular congestion, perihilar opacities, and faint opacity in the right mid and lower lung field. Underlying COPD is noted. Osseous structures are stable. There is little change from prior study. Findings of COPD, mild central vascular congestion, left effusion, and scattered opacities favoring interstitial edema with multifocal pneumonitis not excluded. Tubes and lines as above. However, endotracheal tube is high riding. The findings were sent to the Radiology Results [address] at 7:58 am on 4/17/2019to be communicated to a licensed caregiver. RECOMMENDATION: Suggest advancement of endotracheal tube. Xr Chest Portable    Result Date: 4/16/2019  EXAMINATION: SINGLE XRAY VIEW OF THE CHEST 4/16/2019 6:54 am COMPARISON: 04/15/2019, 610 hours HISTORY: ORDERING SYSTEM PROVIDED HISTORY: ETT placement TECHNOLOGIST PROVIDED HISTORY: ETT placement Ordering Physician Provided Reason for Exam: on vent Acuity: Acute Type of Exam: Initial 68-year-old female on ventilator; check endotracheal tube placement FINDINGS: Portable AP upright view of the chest. Endotracheal tube distal tip overlying the mid trachea approximately 4.1 cm above the level of the ron. Enteric tube traverses the GE junction with distal tip excluded from the field of view. Left subclavian approach cardiac pacemaker device distal lead tips relatively stable in position. Right internal jugular approach central venous catheter distal tip overlying the high right atrium, stable. Cardiac monitor leads overlie the chest. Atherosclerotic calcification of the thoracic aorta. Slight stable volume loss of the left hemithorax. No pneumothorax.   No free air.  Dense retrocardiac/left basilar airspace consolidation and small left-sided pleural effusion. Stable mild focal opacity at the right mid lung zone. Underlying COPD. Stable mild pulmonary vascular congestion and left-sided predominant parahilar opacity. Visualized osseous structures remain unchanged. 1. Stable multifocal airspace disease as detailed above with dense retrocardiac/left basilar airspace consolidation and small left-sided pleural effusion. Mild pulmonary vascular congestion. Findings may represent edema or multifocal pneumonia. 2. Underlying COPD. 3. Tubes and line as detailed above. Xr Chest Portable    Result Date: 4/15/2019  EXAMINATION: SINGLE XRAY VIEW OF THE CHEST 4/15/2019 6:47 am COMPARISON: 14 April 2019 HISTORY: ORDERING SYSTEM PROVIDED HISTORY: ETT placement TECHNOLOGIST PROVIDED HISTORY: ETT placement Ordering Physician Provided Reason for Exam: on vent Acuity: Acute Type of Exam: Initial FINDINGS: AP portable view of the chest time stamped at 612 hours demonstrates overlying cardiac monitoring electrodes. Endotracheal tube terminates 4 cm above the ron. Bipolar pacemaker enters from the left with intact leads in appropriate positions. Intestinal tube extends beyond the fundus of the stomach, tip not included. Right internal jugular catheter terminates at the cavoatrial junction. Heart size is normal.  Aortic arch is calcified. There is interval improvement in vascular congestion with resolution of perihilar opacities. Some bibasilar opacities remain. No extrapleural air is noted. Osseous structures are stable. Interval improvement in vascular congestion and bilateral opacities consistent with resolving pulmonary edema. Tubes and lines as above. Xr Chest Portable    Result Date: 4/14/2019  EXAMINATION: SINGLE XRAY VIEW OF THE CHEST 4/14/2019 7:57 am COMPARISON: Portable chest 04/13/2019.  HISTORY: ORDERING SYSTEM PROVIDED HISTORY: Intubation TECHNOLOGIST PROVIDED HISTORY: Intubation Ordering Physician Provided Reason for Exam: intubation Acuity: Acute Type of Exam: Initial Additional signs and symptoms: intubation FINDINGS: Endotracheal tube terminates over the midthoracic trachea. Dual-chamber pacemaker leads appear unchanged in position. Right IJ approach central venous catheter unchanged in position. Heart size not substantially changed. Perihilar and basilar opacities further increased. Left pleural effusion increased in size. Findings may reflect pulmonary edema, progressed from yesterday's exam.  Left pleural effusion increased in size. Xr Chest Portable    Result Date: 4/12/2019  EXAMINATION: SINGLE XRAY VIEW OF THE CHEST 4/12/2019 5:26 am COMPARISON: November 14, 2015. HISTORY: ORDERING SYSTEM PROVIDED HISTORY: line placement TECHNOLOGIST PROVIDED HISTORY: line placement Ordering Physician Provided Reason for Exam: New right side line placement. Acuity: Acute Type of Exam: Initial Additional signs and symptoms: New right side line placement. FINDINGS: Stable left pectoral trans venous cardiac pacer device. New right IJ central venous catheter with tip near the superior atrial caval junction. Normal lung volume. No new consolidation. Curvilinear radiopacity projecting over the right upper lobe likely represents artifact from a skin fold. No pleural effusion or pneumothorax. Stable cardiomediastinal silhouette and great vessels with redemonstration of atherosclerotic thoracic aorta. New right IJ central venous catheter with tip near the superior atrial caval junction. No pneumothorax. No new consolidation.      Vl Upper Extremity Bilateral Venous Duplex    Result Date: 4/22/2019    [facility]  Vascular Upper Extremities Veins Procedure   Patient Name   Dafne Valerio     Date of Study           04/22/2019                 Mariam Gao   Date of Birth  1950  Gender                  Female   Age            77 year(s)  Race compressibility of the  basilic vein. Velocities are measured in cm/s ; Diameters are measured in cm Right UE Vein Measurements 2D Measurements +------------------------------------+----------+---------------+----------+ ! Location                            ! Visualized! Compressibility! Thrombosis! +------------------------------------+----------+---------------+----------+ ! Prox SCV                            ! Yes       ! Yes            ! None      ! +------------------------------------+----------+---------------+----------+ ! Dist SCV                            ! Yes       ! Yes            ! None      ! +------------------------------------+----------+---------------+----------+ ! Prox Axillary                       ! Yes       ! Yes            ! None      ! +------------------------------------+----------+---------------+----------+ ! Dist Axillary                       ! Yes       ! Yes            ! None      ! +------------------------------------+----------+---------------+----------+ ! Prox Brachial                       !Yes       ! Yes            ! None      ! +------------------------------------+----------+---------------+----------+ ! Dist Brachial                       !Yes       ! Yes            ! None      ! +------------------------------------+----------+---------------+----------+ ! Prox Radial                         !Yes       ! Yes            ! None      ! +------------------------------------+----------+---------------+----------+ ! Dist Radial                         !Yes       ! Yes            ! None      ! +------------------------------------+----------+---------------+----------+ ! Prox Ulnar                          ! Yes       ! Yes            ! None      ! +------------------------------------+----------+---------------+----------+ ! Dist Ulnar                          ! Yes       ! Yes            ! None      ! +------------------------------------+----------+---------------+----------+ ! Yessenia at UA !Yes       !Yes            ! None      ! +------------------------------------+----------+---------------+----------+ ! Basilic at AF                       ! Yes       ! Yes            ! None      ! +------------------------------------+----------+---------------+----------+ ! Basilic at [address]                       ! Yes       ! Yes            ! None      ! +------------------------------------+----------+---------------+----------+ ! Cephalic at UA                      ! Yes       ! Yes            ! None      ! +------------------------------------+----------+---------------+----------+ ! Cephalic at AF                      ! Yes       ! Yes            ! None      ! +------------------------------------+----------+---------------+----------+ ! Cephalic at [address]                      ! Yes       ! Yes            ! None      ! +------------------------------------+----------+---------------+----------+ Doppler Measurements +-------------------------+-----------------------+------------------------+ ! Location                 ! Signal                 !Reflux                  ! +-------------------------+-----------------------+------------------------+ ! SCV                      ! Phasic                 ! No                      ! +-------------------------+-----------------------+------------------------+ ! Axillary                 ! Phasic                 ! No                      ! +-------------------------+-----------------------+------------------------+ ! Brachial                 !Phasic                 ! No                      ! +-------------------------+-----------------------+------------------------+ Left UE Vein Measurements 2D Measurements +------------------------------------+----------+---------------+----------+ ! Location                            ! Visualized! Compressibility! Thrombosis! +------------------------------------+----------+---------------+----------+ ! Prox IJV                            ! Yes       ! Yes            ! None ! +------------------------------------+----------+---------------+----------+ ! Dist IJV                            ! Yes       ! Yes            ! None      ! +------------------------------------+----------+---------------+----------+ ! Prox SCV                            ! Yes       ! Yes            ! None      ! +------------------------------------+----------+---------------+----------+ ! Dist SCV                            ! Yes       ! Yes            ! None      ! +------------------------------------+----------+---------------+----------+ ! Prox Axillary                       ! Yes       ! Yes            ! None      ! +------------------------------------+----------+---------------+----------+ ! Dist Axillary                       ! Yes       ! Yes            ! None      ! +------------------------------------+----------+---------------+----------+ ! Prox Brachial                       !Yes       ! Yes            ! None      ! +------------------------------------+----------+---------------+----------+ ! Dist Brachial                       !Yes       ! Yes            ! None      ! +------------------------------------+----------+---------------+----------+ ! Prox Radial                         !Yes       ! Yes            ! None      ! +------------------------------------+----------+---------------+----------+ ! Dist Radial                         !Yes       ! Yes            ! None      ! +------------------------------------+----------+---------------+----------+ ! Prox Ulnar                          ! Yes       ! Yes            ! None      ! +------------------------------------+----------+---------------+----------+ ! Dist Ulnar                          ! Yes       ! Yes            ! None      ! +------------------------------------+----------+---------------+----------+ ! Basilic at                        ! Yes       ! Yes            ! None      ! +------------------------------------+----------+---------------+----------+ ! Cephalic at UA !Yes       !Yes            ! None      ! +------------------------------------+----------+---------------+----------+ ! Cephalic at AF                      ! Yes       ! Yes            ! None      ! +------------------------------------+----------+---------------+----------+ Doppler Measurements +-------------------------+-----------------------+------------------------+ ! Location                 ! Signal                 !Reflux                  ! +-------------------------+-----------------------+------------------------+ ! IJV                      ! Phasic                 !                        ! +-------------------------+-----------------------+------------------------+ ! SCV                      ! Phasic                 !                        ! +-------------------------+-----------------------+------------------------+ ! Axillary                 ! Phasic                 !                        ! +-------------------------+-----------------------+------------------------+ ! Brachial                 !Phasic                 !                        ! +-------------------------+-----------------------+------------------------+    Vl Dup Lower Extremity Venous Bilateral    Result Date: 5/7/2019    [facility]  Vascular Lower Extremities DVT Study Procedure   Patient Name   \[facility]\""     Date of Study           05/07/2019                 Kristi Coffman   Date of Birth  1950  Gender                  Female   Age            77 year(s)  Race                       Room Number    2123        Height:                 59.84 inch, 152 cm   Corporate ID # [id number]    Weight:                 102 pounds, 46.3 kg   Patient Acct # [de-identified]   BSA:        1.4 m^2     BMI:      20.03 kg/m^2   MR #           [id number]      Sonographer             Valeira Parsons RVT   Accession #    [id number]   Interpreting Physician  Jazmin Whaley   Referring                  Referring Physician     Wilfrido Barlow MD Nurse  Practitioner  Procedure Type of Study:   Veins: Lower Extremities DVT Study, Venous Scan Lower Bilateral.  Indications for Study:Pain and swelling. Patient Status: In Patient. Technical Quality:Limited visualization. Limitation reason:patient movement. Conclusions   Summary   No evidence of superficial or deep venous thrombosis in both lower  extremities. Technically limited visualization. Signature   ----------------------------------------------------------------  Electronically signed by Lauro Wahl RVT(Sonographer) on  05/07/2019 01:22 PM  ----------------------------------------------------------------   ----------------------------------------------------------------  Electronically signed by Devang Oliveira(Interpreting  physician) on 05/07/2019 06:45 PM  ----------------------------------------------------------------  Findings:   Right Impression:                    Left Impression:  The common femoral, femoral,         The common femoral, femoral,  popliteal and tibial veins           popliteal and tibial veins  demonstrate normal compressibility   demonstrate normal compressibility  and augmentation. and augmentation. Limited visualization of the calf    Limited visualization of the calf  veins. veins. Normal compressibility of the great  Normal compressibility of the great  saphenous vein. saphenous vein. Normal compressibility of the small  Normal compressibility of the small  saphenous vein. saphenous vein. Velocities are measured in cm/s ; Diameters are measured in cm Right Lower Extremities DVT Study Measurements Right 2D Measurements +------------------------------------+----------+---------------+----------+ ! Location                            ! Visualized! Compressibility! Thrombosis! +------------------------------------+----------+---------------+----------+ ! Common Femoral !Yes       !Yes            ! None      ! +------------------------------------+----------+---------------+----------+ ! Prox Femoral                        !Yes       ! Yes            ! None      ! +------------------------------------+----------+---------------+----------+ ! Mid Femoral                         !Yes       ! Yes            ! None      ! +------------------------------------+----------+---------------+----------+ ! Dist Femoral                        !Yes       ! Yes            ! None      ! +------------------------------------+----------+---------------+----------+ ! Deep Femoral                        !Yes       ! Yes            ! None      ! +------------------------------------+----------+---------------+----------+ ! Popliteal                           !Yes       ! Yes            ! None      ! +------------------------------------+----------+---------------+----------+ ! Sapheno Femoral Junction            ! Yes       ! Yes            ! None      ! +------------------------------------+----------+---------------+----------+ ! PTV                                 ! Partial   !Yes            ! None      ! +------------------------------------+----------+---------------+----------+ ! Peroneal                            !Partial   !Yes            ! None      ! +------------------------------------+----------+---------------+----------+ ! Gastroc                             ! Yes       ! Yes            ! None      ! +------------------------------------+----------+---------------+----------+ ! GSV Thigh                           ! Yes       ! Yes            ! None      ! +------------------------------------+----------+---------------+----------+ ! GSV Knee                            ! Yes       ! Yes            ! None      ! +------------------------------------+----------+---------------+----------+ ! GSV Ankle                           ! Yes       ! Yes            ! None      ! !Yes       !Yes            ! None      ! +------------------------------------+----------+---------------+----------+ ! GSV Thigh                           ! Yes       ! Yes            ! None      ! +------------------------------------+----------+---------------+----------+ ! GSV Knee                            ! Yes       ! Yes            ! None      ! +------------------------------------+----------+---------------+----------+ ! GSV Ankle                           ! Yes       ! Yes            ! None      ! +------------------------------------+----------+---------------+----------+ ! SSV                                 ! Partial   !Yes            ! None      ! +------------------------------------+----------+---------------+----------+    Ir Cholecystostomy Percutaneous Complete    Result Date: 4/22/2019  PROCEDURE: ULTRASOUND and fluoroscopic GUIDED CHOLECYSTOSTOMY TUBE PLACEMENT April 22, 2019 HISTORY: ORDERING SYSTEM PROVIDED HISTORY: acute cholecystitis TECHNOLOGIST PROVIDED HISTORY: acute cholecystitis SEDATION: 75 mcg IV fentanyl for pain TECHNIQUE: Informed consent was obtained after a detailed explanation of the procedure including risks, benefits, and alternatives. Universal protocol was followed. A suitable skin site was prepped and draped in sterile fashion following ultrasound localization. An 18 gauge needle was advanced under ultrasound guidance into the gallbladder via transhepatic route and a 0.035 guidewire was used to place a 8 [place] cholecystostomy tube under fluoroscopic guidance. The catheter was sutured to the skin and the patient tolerated the procedure well. Thick bilious material was sent for laboratory analysis. The catheter was attached to gravity drainage. FINDINGS: Ultrasound image demonstrates distended gallbladder. Bilious fluid was sent for culture. Successful placement of transhepatic 8 Croatian percutaneous cholecystostomy tube.      Nm Hepatobiliary Scan W Ejection Fraction    Result Date: 4/20/2019  EXAMINATION: NUCLEAR MEDICINE HEPATOBILIARY SCINTIGRAPHY (HIDA SCAN). 4/20/2019 2:15 pm TECHNIQUE: Approximately 5.6 [id number] Mebrofenin (Choletec) was administered IV. Then, dynamic images of the abdomen were obtained in the anterior projection for 60 mins. A right lateral view was also obtained at 60 mins. Delayed images up to 4 hours were obtained. COMPARISON: Ultrasound 04/19/2019 HISTORY: ORDERING SYSTEM PROVIDED HISTORY: CHOLECYSTITIS TECHNOLOGIST PROVIDED HISTORY: Ordering Physician Provided Reason for Exam: Cholecystitis Acuity: Unknown Type of Exam: Unknown FINDINGS: Prompt, homogenous uptake by the liver is noted with normal appearance of radiotracer excretion into the biliary system. Clearance of bloodpool activity appears appropriate. Normal small bowel activity is seen. Prominent activity within the common bile duct. The gallbladder is not visualized by the end of the exam.     Absent filling of the gallbladder consistent with acute cholecystitis. Fl Small Bowel Follow Through Only    Result Date: 5/10/2019  EXAMINATION: SMALL BOWEL FOLLOW THROUGH SERIES 5/9/2019 TECHNIQUE: Small bowel follow through series was performed with overhead images. FLUOROSCOPY DOSE AND TYPE OR TIME AND EXPOSURES: No fluoroscopic images obtained. COMPARISON: CT abdomen pelvis 05/05/2019 HISTORY: ORDERING SYSTEM PROVIDED HISTORY: internal hernia TECHNOLOGIST PROVIDED HISTORY: Water soluble contrast only. Study to be done ONLY if NGT is placed by IR internal hernia FINDINGS:  image demonstrates NG tube overlying the left side of the abdomen within the stomach. Surgical drain overlies the right side of the abdomen. There is a nonspecific bowel gas pattern with mild dilated loops of bowel in lower abdomen. Initial images demonstrate filling of the stomach. At 1 hour and 45 minutes no significant contrast is noted in the small bowel.  The 4-1/2 hour image demonstrates contrast within loops of bowel within the mid and lower abdomen and pelvis. There appears to be contrast extending to the colon in the right side of the abdomen. Contrast is noted within the pelvis which may be within the bladder or rectum. Significant delay in emptying of the stomach with persistent contrast noted at the 6 hour concha. There is contrast extending into the bowel which appears to be in the colon. Subtle findings are limited. Consider follow-up radiograph of the abdomen in 6-8 hours. Ir Place Ng Tube By Dr Timbo Griggs    Result Date: 5/9/2019  PROCEDURE: XR PLACE NASOGASTRIC TUBE PHYS 5/9/2019 HISTORY: ORDERING SYSTEM PROVIDED HISTORY: ileus TECHNOLOGIST PROVIDED HISTORY: ileus Ordering Physician Provided Reason for Exam: ILEUS Acuity: Unknown Type of Exam: Unknown CONTRAST: None SEDATION: None FLUOROSCOPY DOSE AND TYPE OR TIME AND EXPOSURES: 21 seconds; D AP 46 DESCRIPTION OF PROCEDURE AND FINDINGS: This procedure was performed by Dr. Kwasi Ravi. Under intermittent fluoroscopic guidance a 12 Chinese nasogastric tube was placed through the right nostril and directed under fluoroscopy into the stomach. A small amount of air was injected confirming the tip location in the stomach. Partially visualized cholecystostomy tube and pacer wires. Tube was secured in place to the patient's nose with an adhesive dressing. 12 Chinese nasogastric tube placed successfully with its tip in the stomach. Physical Examination:        Physical Exam   Constitutional: She is oriented to person, place, and time. She appears distressed. HENT:   Oropharynx dry    Eyes:   Pale conjunctiva   Neck: Neck supple. Cardiovascular: Normal rate, regular rhythm and normal heart sounds. Pulmonary/Chest: Effort normal.   Diminished breath sounds left >right , Lower lung field   Abdominal: Soft. She exhibits no distension. There is tenderness (in all quadrants). There is guarding. Musculoskeletal: She exhibits no edema or tenderness. Neurological: She is alert and oriented to person, place, and time. Left sided hemiparesis - chronic    Skin: Skin is warm and dry. Nursing note and vitals reviewed. Assessment:        Primary Problem  GI bleed    Active Hospital Problems    Diagnosis Date Noted    Small bowel obstruction ([facility] [address] [K56.609]     Anxiety disorder [F41.9]     Noncompliance [Z91.19]     Anemia [D64.9]     GI bleed [K92.2] 05/03/2019    Hemorrhage of rectum and anus [K62.5]     Centrilobular emphysema ([address] [J43.2] 04/30/2019    CRP elevated [R79.82]     Elevated procalcitonin [R79.89]     Bandemia [D72.825]     Cholecystitis [K81.9]     Abdominal distention [R14.0]     Elevated CEA [R97.0]     Elevated CA 19-9 level [R97.8]     Urinary retention [R33.9]     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]     Septic shock (HCC) [A41.9, R65.21]     Leukemoid reaction [D72.823]     Aspiration pneumonia of right lower lobe due to vomit ([address] [J69.0]     MRSA carrier [Z22.322]     Sepsis due to urinary tract infection ([facility] [address] [A41.9, N39.0] 04/11/2019    Cystitis [N30.90] 04/11/2019       Plan:        Acute Respiratory Failure r/o sepsis 2/2 aspiration pneumonia  - No intubation per code status  - Pulm consulted - appreciate recs  - ID consulted - appreciate recs    Diflucan + rocephin dced 5/9   Meropenam + Vanc stsarted 5/10   - On BIPAP, Precedex, and Levophed ggt  - WBC 21, Procalcitonin 0.22, L.A. 1.5  - one dose lasix 5/10    - Con.  Dulera, Xopenex, Symbicort, Atrovent  - Blood culture NGTD   -f/u UA     Abdominal pain, Ileus vs SBO  - IR placed NG 5/9  - Gen surg consulted - appreciate recs    Suggesting laparotomy    Attained consent from patient's brother   - SBFT significantly delayed emptying after 6h    Hypernatremia   Na: 146   Lasix dced      Anemia, likely due to GI Bleed  - Patient refusing intervention EGC/ colonoscopy   - Ethics was consulted, still not seen pt yet, phone call attempted   - Hgb 10.7 s/p 6u PRBC total  - Transfuse Hgb <7.0  - Continue to monitor   - GI consulted - appreciate recs      Psych consult  - Per psych consult, unable to evaluate appropriately   - Patient refusing all medical treatment   - MMSE score 22     Initial sepsis likely secondary to e.coli, emphysematous cystitis - resolved   - Urology consulted - appreciate recs - dyer dced (4/30)  - Cholecystotomy tube 4/22      Sinus Tachycardia  - Fluids given   - Hx of CBA, bradycardia with pacemaker   - Lopressor   - Lopressor PRN   - Monitoring      Acute cholecystitis   - General surgery consulted - appreciate recs   - Surgical intervention: cholecystomy tube 4/22    - Cholecystectomy in patient when medically stable      Gastroparesis   - Protonix  - Reglan  - Daily EKG  - Considering systemic autonomic dysfunction as overlying diagnosis (gastroparesis, - neurogenic bladder, hypotension/fluctuating BPs)      Maintenance  - Heparin 5000u q12h   - TPN per dietary, Phos 2.4  - Continue home meds trazodone, ASA, Plavix, Norvasc, Effexor, Spiriva     Dispo  - PT/OT eval and treat   - Social work [employer] planning      Patient refusing all surgical intervention. Obtained consent from patient's next of kin: brother. Discussed in length risks of no medical management. Patient voiced understanding. Trying for appeal of LTAC, [facility]  [address] [facility]  5/11/2019  7:25 AM     Attending Physician Statement  Patient seen and examined  I have discussed the care of the patient, including pertinent history and exam findings,  with the resident. I have reviewed the key elements of all parts of the encounter with the resident. I agree with the assessment, plan and orders as documented by the resident.     Stacy Kapoor

## 2019-05-11 NOTE — FLOWSHEET NOTE
05/11/19 1551   Encounter Summary   Services provided to: Patient   Referral/Consult From: [facility]   (5-11-19)   Volunteer Visit Yes   Complexity of Encounter Low   Length of Encounter 15 minutes   Routine   Type Follow up   Spiritual/Christianity   Type Spiritual support   Intervention Prayer

## 2019-05-11 NOTE — PROGRESS NOTES
Patient yells out in pain, the next breath yells for drinks, then yells for reposition then yells not to reposition her, yells that she needs something for anxiety. Increasing precedex as needed.

## 2019-05-11 NOTE — PROGRESS NOTES
ICU Progress Note (Non-Vent)  [facility]    Patient - Angelika Jay,  Age - 77 y.o.    - 1950      Room Number -    MRN -  [id number]   Acct # - [de-identified]  Date of Admission -  2019  2:48 PM    Events of Past 24 Hours   More alert, at times agitated even on Precedex drip. Unclear how much she understands or is capable of making any decisions. She is demanding water. I told her that she may have surgery day and she said \"oh no!\", but did not refuse surgery    Vitals    height is 5' (1.524 m) and weight is 83 lb 9.6 oz (37.9 kg). Her axillary temperature is 96.9 °F (36.1 °C). Her blood pressure is 94/51 (abnormal) and her pulse is 80. Her respiration is 23 and oxygen saturation is 100%.        Temperature Range: Temp: 96.9 °F (36.1 °C) Temp  Av.8 °F (36.6 °C)  Min: 96.9 °F (36.1 °C)  Max: 98.8 °F (37.1 °C)  BP Range:  Systolic (85SCA), RCM:689 , Min:71 , AXO:574     Diastolic (86NSG), ZIR:33, Min:24, Max:84    Pulse Range: Pulse  Av.4  Min: 72  Max: 130  Respiration Range: Resp  Av.2  Min: 15  Max: 41  Current Pulse Ox[de-identified]  SpO2: 100 %  24HR Pulse Ox Range:  SpO2  Av.6 %  Min: 97 %  Max: 100 %  Oxygen Amount and Delivery: O2 Flow Rate (L/min): 0 L/min    Wt Readings from Last 3 Encounters:   19 83 lb 9.6 oz (37.9 kg)   19 89 lb (40.4 kg)   19 94 lb 1.6 oz (42.7 kg)     I/O       Intake/Output Summary (Last 24 hours) at 2019 0702  Last data filed at 2019 0544  Gross per 24 hour   Intake 1416.72 ml   Output 1275 ml   Net 141.72 ml     DRAIN/TUBE OUTPUT       Invasive Lines   ICP PRESSURE RANGE  No data recorded  CVP PRESSURE RANGE  No data recorded      Medications      alteplase  1 mg Intracatheter Once    levalbuterol  1.25 mg Nebulization Q4H    ipratropium  0.5 mg Nebulization Q4H    heparin (porcine)  5,000 Units Subcutaneous BID    meropenem  1 g Intravenous Q8H    vancomycin  750 mg Intravenous Q12H    vancomycin (VANCOCIN) intermittent dosing (placeholder)   Other RX Placeholder    oxymetazoline  2 spray Each Nare BID    mometasone-formoterol  2 puff Inhalation BID    pantoprazole  40 mg Oral QAM AC    metoprolol tartrate  12.5 mg Oral BID    fat emulsion  100 mL Intravenous Daily    magnesium sulfate  1 g Intravenous Once    insulin lispro  0-12 Units Subcutaneous Q6H    venlafaxine  37.5 mg Oral TID    aspirin  81 mg Oral Daily    sodium chloride flush  10 mL Intravenous 2 times per day     diatrizoate meglumine-sodium, sodium chloride, sodium chloride flush, morphine, LORazepam, ondansetron, zolpidem, sodium chloride flush, hydrOXYzine, metoprolol, sodium chloride nebulizer, fentanNYL, oxyCODONE-acetaminophen, magnesium sulfate, sodium phosphate IVPB **OR** sodium phosphate IVPB, potassium chloride **OR** potassium alternative oral replacement **OR** potassium chloride, glucose, dextrose, glucagon (rDNA), dextrose, milk and molasses, sodium chloride flush, acetaminophen  IV Drips/Infusions   dexmedetomidine (PRECEDEX) IV infusion 0.6 mcg/kg/hr (05/11/19 0520)    norepinephrine 1 mcg/min (05/11/19 0636)    PN-Adult 2-in-1 Central Line (Standard) 65 mL/hr at 05/10/19 1921    dextrose         Diet/Nutrition   Diet NPO Effective Now  PN-Adult 2-in-1 Central Line (Standard)    Exam      Constitutional - Alert, arousable, agitated  General Appearance  thin and frail   HEENT -normocephalic, atraumatic. PERRLA, wearing BiPAP mask  Lungs - Chest expands equally, no wheezes, rales or rhonchi. Managed with poor movement  Cardiovascular - Heart sounds are normal.  normal rate and rhythm regular, no murmur, gallop or rub. Abdomen - soft, nontender, nondistended, no masses or organomegaly  Neurologic - CN II-XII are grossly intact.  There are no focal motor deficits  Skin - no bruising or bleeding  Extremities - no cyanosis, clubbing or edema    Lab Results   CBC     Lab Results   Component Value Date    WBC 21.4 05/11/2019    RBC 3.86 05/11/2019    RBC 3.66 05/23/2012    HGB 10.7 05/11/2019    HCT 33.8 05/11/2019     05/11/2019     05/23/2012    MCV 87.6 05/11/2019    MCH 27.7 05/11/2019    MCHC 31.6 05/11/2019    RDW 17.2 05/11/2019    METASPCT 2 05/07/2019    LYMPHOPCT PENDING 05/11/2019    MONOPCT PENDING 05/11/2019    MYELOPCT 1 05/07/2019    BASOPCT PENDING 05/11/2019    MONOSABS PENDING 05/11/2019    LYMPHSABS PENDING 05/11/2019    EOSABS PENDING 05/11/2019    BASOSABS PENDING 05/11/2019    DIFFTYPE NOT REPORTED 05/11/2019       BMP   Lab Results   Component Value Date     05/11/2019    K 3.6 05/11/2019     05/11/2019    CO2 19 05/11/2019    BUN 40 05/11/2019    CREATININE 0.42 05/11/2019    GLUCOSE 129 05/11/2019    GLUCOSE 87 05/21/2012       LFTS  Lab Results   Component Value Date    ALKPHOS 196 05/11/2019    ALT 18 05/11/2019    AST 19 05/11/2019    PROT 5.9 05/11/2019    BILITOT 0.38 05/11/2019    BILIDIR 0.21 05/11/2019    IBILI 0.17 05/11/2019    LABALBU 1.9 05/11/2019    LABALBU 4.6 05/17/2012       ABG ABGs:   Lab Results   Component Value Date    PHART 7.370 05/10/2019    PO2ART 68.2 05/10/2019    PPD6TEG 37.1 05/10/2019       Lab Results   Component Value Date    MODE NOT REPORTED 05/10/2019         INR  Recent Labs     05/09/19  0528 05/10/19  0657 05/11/19  0400   PROTIME 16.7* 16.8* 17.7*   INR 1.3 1.4 1.5       APTT  No results for input(s): APTT in the last 72 hours. Lactic Acid  Lab Results   Component Value Date    LACTA 1.5 05/10/2019    LACTA 2.2 05/10/2019    LACTA 1.5 04/24/2019        BNP   No results for input(s): BNP in the last 72 hours.      Cultures       Radiology     CXR      Right-sided PICC line in place, decreased effusion and some basilar left-sided atelectasis      SYSTEMS ASSESSMENT    Acute hypoxic respiratory failure  Severe protein calorie malnutrition  Possible recurrent aspiration pneumonia  COPD severe clinically  Hypernatremia  Hypotension on norepinephrine, but improved compared to yesterday        Neuro   Waxing and waning mental status, I do not feel that she is competent to make an informed decision or that she is aware of the consequences of her decisions. Can use Precedex for severe agitation    Respiratory   Wean oxygen as tolerated.  Keep O2 sat > 88%  Can use BiPAP as needed  X-ray shows no significant new effusions or infiltrate  Continue DuoNeb aerosols every 4 hours  If surgery is planned, anticipate that she will require ventilatory support      Cardiovascular   Norepinephrine has been wean down but still borderline hypotensive  Lactic acid normal       Gastrointestinal   TPN has been adjusted  Remains on GI prophylaxis    Renal   Hypernatremia improved  Slow correction of sodium level      Infectious Disease   Antibiotics per ID  Proclcitonin level minimally elevated    Hematology/Oncology   Continue DVT prophylaxis with subcu heparin  Hemoglobin holding steady    Endocrine     Blood sugars are acceptable  Cortisol level acceptable  Social/Spiritual/DNR/Disposition/Other   Airway brother Rayma Leventhal has been found is agreeable to her having surgery if needed    Critical Care Time   35 min    Electronically signed by Javan Rider MD on 5/11/2019 at 7:02 AM

## 2019-05-11 NOTE — PROGRESS NOTES
Nutrition Assessment (Parenteral Nutrition)    Type and Reason for Visit: Reassess    Nutrition Recommendations: Continue TPN with the following adjustments to additives: K Phosphate 0 to 20 mMol, remove MVI and Trace Elements. Continue NPO. Nutrition Assessment: Pt relatively stable from a nutritional viewpoint although lab values are altered, in particular drastic shifts in potassium and phosphorus. Labs are drawn off the line at times which may alter results; corrective actions to reduce potassium also took place yesterday, including removal of all potassium and phosphate from TPN. 20 mMol potassium phosphate to be added back today- will continue to monitor labs and overall nutrition progression. Await decision regarding surgical intervention. Malnutrition Assessment:  · Malnutrition Status: At risk for malnutrition  · Context: Acute illness or injury  · Findings of the 6 clinical characteristics of malnutrition (Minimum of 2 out of 6 clinical characteristics is required to make the diagnosis of moderate or severe Protein Calorie Malnutrition based on AND/ASPEN Guidelines):  1. Energy Intake-Less than or equal to 75% of estimated energy requirement(Previously; improving with parenteral nutrition), Greater than or equal to 5 days    2. Weight Loss-Unable to assess(edema present),    3. Fat Loss-Unable to assess,    4. Muscle Loss-Unable to assess,    5. Fluid Accumulation-Mild fluid accumulation(Mild to moderate), Extremities  6.   Strength-Not measured    Nutrition Risk Level: High    Nutrient Needs:  · Estimated Daily Total Kcal: 2038-3121 based on wt of 25-27 per kg using wt of 57.2 kg  · Estimated Daily Protein (g): 63-68 based on 1.4-1.5 gm/kg IBW(revised)    Nutrition Diagnosis:   · Problem: Inadequate oral intake  · Etiology: related to Alteration in GI function, Insufficient energy/nutrient consumption     Signs and symptoms:  as evidenced by NPO status due to medical condition, Nutrition support - PN, GI abnormality    Objective Information:  · Nutrition-Focused Physical Findings: Abdominal distention. Hypoactive bowel sounds. NG to low intermittent suction. Edema: +1 RUE, RLE, LLE; +2 LUE. · Wound Type: Deep Tissue Injury, Multiple, Stage II, Pressure Ulcer(Wound under cast)  · Current Nutrition Therapies:  · Oral Diet Orders: NPO   · Parenteral Nutrition Orders:  · Type and Formula: Premix Central, 2-in-1 Custom   · Lipids: 100ml, Daily  · Rate/Volume: 65 ml/ 1560 ml daily  · Duration: Continuous  · Current and goal PN Order Provides: 1573 kcals, 78 gm of protein (lipids included)  · Anthropometric Measures:  · Ht: 5' (152.4 cm)   · Current Body Wt: 83 lb 8.9 oz (37.9 kg)(question accuracy of wt measurement)  · Admission Body Wt: 102 lb (46.3 kg)  · Ideal Body Wt: 100 lb (45.4 kg), % Ideal Body 102% based on admission wt  · BMI Classification: BMI 18.5 - 24.9 Normal Weight(Based on admission wt)    Nutrition Interventions:   Continue NPO, Modify current Parenteral Nutrition  Continued Inpatient Monitoring    Nutrition Evaluation:   · Evaluation: Progressing toward goals   · Goals: PN to meet greater 90% of estimated nutrition needs   · Monitoring: Nutrition Progression, PN Intake, PN Tolerance, Wound Healing, I&O, Weight, Pertinent Labs, Monitor Bowel Function, Mental Status/Confusion    Cherry Melgoza R.D., LJACQUELINE.   Phone: [phone]

## 2019-05-12 ENCOUNTER — ANESTHESIA EVENT (OUTPATIENT)
Dept: OPERATING ROOM | Age: 69
DRG: 853 | End: 2019-05-12
Payer: COMMERCIAL

## 2019-05-12 ENCOUNTER — ANESTHESIA (OUTPATIENT)
Dept: OPERATING ROOM | Age: 69
DRG: 853 | End: 2019-05-12
Payer: COMMERCIAL

## 2019-05-12 LAB
ABO/RH: NORMAL
ABSOLUTE BANDS #: 3.13 K/UL (ref 0–1)
ABSOLUTE EOS #: 0 K/UL (ref 0–0.4)
ABSOLUTE IMMATURE GRANULOCYTE: ABNORMAL K/UL (ref 0–0.3)
ABSOLUTE LYMPH #: 1.49 K/UL (ref 1–4.8)
ABSOLUTE MONO #: 0.6 K/UL (ref 0.1–1.3)
ANION GAP SERPL CALCULATED.3IONS-SCNC: 9 MMOL/L (ref 9–17)
ANTIBODY SCREEN: NEGATIVE
ARM BAND NUMBER: NORMAL
BANDS: 21 % (ref 0–10)
BASOPHILS # BLD: 0 % (ref 0–2)
BASOPHILS ABSOLUTE: 0 K/UL (ref 0–0.2)
BLD PROD TYP BPU: NORMAL
BLD PROD TYP BPU: NORMAL
BUN BLDV-MCNC: 33 MG/DL (ref 8–23)
BUN/CREAT BLD: ABNORMAL (ref 9–20)
CALCIUM SERPL-MCNC: 7.5 MG/DL (ref 8.6–10.4)
CHLORIDE BLD-SCNC: 108 MMOL/L (ref 98–107)
CO2: 23 MMOL/L (ref 20–31)
CREAT SERPL-MCNC: <0.4 MG/DL (ref 0.5–0.9)
CROSSMATCH RESULT: NORMAL
CROSSMATCH RESULT: NORMAL
DIFFERENTIAL TYPE: ABNORMAL
DISPENSE STATUS BLOOD BANK: NORMAL
DISPENSE STATUS BLOOD BANK: NORMAL
EOSINOPHILS RELATIVE PERCENT: 0 % (ref 0–4)
EXPIRATION DATE: NORMAL
GFR AFRICAN AMERICAN: ABNORMAL ML/MIN
GFR NON-AFRICAN AMERICAN: ABNORMAL ML/MIN
GFR SERPL CREATININE-BSD FRML MDRD: ABNORMAL ML/MIN/{1.73_M2}
GFR SERPL CREATININE-BSD FRML MDRD: ABNORMAL ML/MIN/{1.73_M2}
GLUCOSE BLD-MCNC: 113 MG/DL (ref 70–99)
GLUCOSE BLD-MCNC: 123 MG/DL (ref 65–105)
GLUCOSE BLD-MCNC: 125 MG/DL (ref 65–105)
GLUCOSE BLD-MCNC: 98 MG/DL (ref 65–105)
HCT VFR BLD CALC: 28.5 % (ref 36–46)
HCT VFR BLD CALC: 31 % (ref 36–46)
HCT VFR BLD CALC: 32.8 % (ref 36–46)
HEMOGLOBIN: 10.2 G/DL (ref 12–16)
HEMOGLOBIN: 9 G/DL (ref 12–16)
HEMOGLOBIN: 9.9 G/DL (ref 12–16)
IMMATURE GRANULOCYTES: ABNORMAL %
INR BLD: 1.3
LACTIC ACID: 1.8 MMOL/L (ref 0.5–2.2)
LYMPHOCYTES # BLD: 10 % (ref 24–44)
MAGNESIUM: 1.8 MG/DL (ref 1.6–2.6)
MCH RBC QN AUTO: 27.5 PG (ref 26–34)
MCHC RBC AUTO-ENTMCNC: 31.6 G/DL (ref 31–37)
MCV RBC AUTO: 86.9 FL (ref 80–100)
MONOCYTES # BLD: 4 % (ref 1–7)
MORPHOLOGY: ABNORMAL
NRBC AUTOMATED: ABNORMAL PER 100 WBC
PDW BLD-RTO: 17.1 % (ref 11.5–14.9)
PHOSPHORUS: 1.4 MG/DL (ref 2.6–4.5)
PLATELET # BLD: 254 K/UL (ref 150–450)
PLATELET ESTIMATE: ABNORMAL
PMV BLD AUTO: 8.7 FL (ref 6–12)
POTASSIUM SERPL-SCNC: 3.3 MMOL/L (ref 3.7–5.3)
PREALBUMIN: 13.2 MG/DL (ref 20–40)
PROTHROMBIN TIME: 16.6 SEC (ref 11.8–14.6)
RBC # BLD: 3.28 M/UL (ref 4–5.2)
RBC # BLD: ABNORMAL 10*6/UL
SEG NEUTROPHILS: 65 % (ref 36–66)
SEGMENTED NEUTROPHILS ABSOLUTE COUNT: 9.68 K/UL (ref 1.3–9.1)
SODIUM BLD-SCNC: 140 MMOL/L (ref 135–144)
TRANSFUSION STATUS: NORMAL
TRANSFUSION STATUS: NORMAL
UNIT DIVISION: 0
UNIT DIVISION: 0
UNIT NUMBER: NORMAL
UNIT NUMBER: NORMAL
WBC # BLD: 14.9 K/UL (ref 3.5–11)
WBC # BLD: ABNORMAL 10*3/UL

## 2019-05-12 PROCEDURE — 84134 ASSAY OF PREALBUMIN: CPT

## 2019-05-12 PROCEDURE — 6370000000 HC RX 637 (ALT 250 FOR IP): Performed by: INTERNAL MEDICINE

## 2019-05-12 PROCEDURE — 6360000002 HC RX W HCPCS: Performed by: INTERNAL MEDICINE

## 2019-05-12 PROCEDURE — 82947 ASSAY GLUCOSE BLOOD QUANT: CPT

## 2019-05-12 PROCEDURE — 2500000003 HC RX 250 WO HCPCS: Performed by: INTERNAL MEDICINE

## 2019-05-12 PROCEDURE — 1200000000 HC SEMI PRIVATE

## 2019-05-12 PROCEDURE — 2580000003 HC RX 258: Performed by: INTERNAL MEDICINE

## 2019-05-12 PROCEDURE — 36415 COLL VENOUS BLD VENIPUNCTURE: CPT

## 2019-05-12 PROCEDURE — 80048 BASIC METABOLIC PNL TOTAL CA: CPT

## 2019-05-12 PROCEDURE — 85018 HEMOGLOBIN: CPT

## 2019-05-12 PROCEDURE — 2580000003 HC RX 258: Performed by: STUDENT IN AN ORGANIZED HEALTH CARE EDUCATION/TRAINING PROGRAM

## 2019-05-12 PROCEDURE — 85610 PROTHROMBIN TIME: CPT

## 2019-05-12 PROCEDURE — 84100 ASSAY OF PHOSPHORUS: CPT

## 2019-05-12 PROCEDURE — 2580000003 HC RX 258: Performed by: FAMILY MEDICINE

## 2019-05-12 PROCEDURE — 2500000003 HC RX 250 WO HCPCS: Performed by: STUDENT IN AN ORGANIZED HEALTH CARE EDUCATION/TRAINING PROGRAM

## 2019-05-12 PROCEDURE — 94640 AIRWAY INHALATION TREATMENT: CPT

## 2019-05-12 PROCEDURE — 2500000003 HC RX 250 WO HCPCS: Performed by: SURGERY

## 2019-05-12 PROCEDURE — 85014 HEMATOCRIT: CPT

## 2019-05-12 PROCEDURE — 85025 COMPLETE CBC W/AUTO DIFF WBC: CPT

## 2019-05-12 PROCEDURE — 6360000002 HC RX W HCPCS: Performed by: STUDENT IN AN ORGANIZED HEALTH CARE EDUCATION/TRAINING PROGRAM

## 2019-05-12 PROCEDURE — C9113 INJ PANTOPRAZOLE SODIUM, VIA: HCPCS | Performed by: STUDENT IN AN ORGANIZED HEALTH CARE EDUCATION/TRAINING PROGRAM

## 2019-05-12 PROCEDURE — 2500000003 HC RX 250 WO HCPCS: Performed by: FAMILY MEDICINE

## 2019-05-12 PROCEDURE — 83735 ASSAY OF MAGNESIUM: CPT

## 2019-05-12 PROCEDURE — 99999 PR OFFICE/OUTPT VISIT,PROCEDURE ONLY: CPT | Performed by: INTERNAL MEDICINE

## 2019-05-12 PROCEDURE — 83605 ASSAY OF LACTIC ACID: CPT

## 2019-05-12 RX ORDER — POTASSIUM CHLORIDE 7.45 MG/ML
10 INJECTION INTRAVENOUS
Status: DISPENSED | OUTPATIENT
Start: 2019-05-12 | End: 2019-05-12

## 2019-05-12 RX ORDER — HALOPERIDOL 5 MG/ML
2 INJECTION INTRAMUSCULAR EVERY 4 HOURS PRN
Status: DISCONTINUED | OUTPATIENT
Start: 2019-05-12 | End: 2019-05-15

## 2019-05-12 RX ADMIN — METOPROLOL TARTRATE 5 MG: 5 INJECTION, SOLUTION INTRAVENOUS at 20:50

## 2019-05-12 RX ADMIN — NOREPINEPHRINE BITARTRATE 2 MCG/MIN: 1 INJECTION INTRAVENOUS at 04:23

## 2019-05-12 RX ADMIN — FENTANYL CITRATE 50 MCG: 50 INJECTION INTRAMUSCULAR; INTRAVENOUS at 20:47

## 2019-05-12 RX ADMIN — FENTANYL CITRATE 50 MCG: 50 INJECTION INTRAMUSCULAR; INTRAVENOUS at 09:43

## 2019-05-12 RX ADMIN — FENTANYL CITRATE 50 MCG: 50 INJECTION INTRAMUSCULAR; INTRAVENOUS at 12:18

## 2019-05-12 RX ADMIN — CALCIUM GLUCONATE: 94 INJECTION, SOLUTION INTRAVENOUS at 17:59

## 2019-05-12 RX ADMIN — LEVALBUTEROL 1.25 MG: 1.25 SOLUTION, CONCENTRATE RESPIRATORY (INHALATION) at 20:27

## 2019-05-12 RX ADMIN — HALOPERIDOL LACTATE 2 MG: 5 INJECTION, SOLUTION INTRAMUSCULAR at 09:43

## 2019-05-12 RX ADMIN — MORPHINE SULFATE 2 MG: 2 INJECTION, SOLUTION INTRAMUSCULAR; INTRAVENOUS at 17:49

## 2019-05-12 RX ADMIN — MORPHINE SULFATE 2 MG: 2 INJECTION, SOLUTION INTRAMUSCULAR; INTRAVENOUS at 02:23

## 2019-05-12 RX ADMIN — MEROPENEM 1 G: 1 INJECTION, POWDER, FOR SOLUTION INTRAVENOUS at 02:23

## 2019-05-12 RX ADMIN — DEXMEDETOMIDINE HYDROCHLORIDE 0.7 MCG/KG/HR: 100 INJECTION, SOLUTION INTRAVENOUS at 05:45

## 2019-05-12 RX ADMIN — VANCOMYCIN HYDROCHLORIDE 750 MG: 5 INJECTION, POWDER, LYOPHILIZED, FOR SOLUTION INTRAVENOUS at 22:22

## 2019-05-12 RX ADMIN — VANCOMYCIN HYDROCHLORIDE 750 MG: 5 INJECTION, POWDER, LYOPHILIZED, FOR SOLUTION INTRAVENOUS at 00:14

## 2019-05-12 RX ADMIN — POTASSIUM CHLORIDE 10 MEQ: 7.46 INJECTION, SOLUTION INTRAVENOUS at 08:48

## 2019-05-12 RX ADMIN — VENLAFAXINE 37.5 MG: 37.5 TABLET ORAL at 20:53

## 2019-05-12 RX ADMIN — Medication 10 ML: at 08:50

## 2019-05-12 RX ADMIN — MORPHINE SULFATE 2 MG: 2 INJECTION, SOLUTION INTRAMUSCULAR; INTRAVENOUS at 22:29

## 2019-05-12 RX ADMIN — VANCOMYCIN HYDROCHLORIDE 750 MG: 5 INJECTION, POWDER, LYOPHILIZED, FOR SOLUTION INTRAVENOUS at 13:55

## 2019-05-12 RX ADMIN — POTASSIUM CHLORIDE 10 MEQ: 7.46 INJECTION, SOLUTION INTRAVENOUS at 10:20

## 2019-05-12 RX ADMIN — MEROPENEM 1 G: 1 INJECTION, POWDER, FOR SOLUTION INTRAVENOUS at 10:35

## 2019-05-12 RX ADMIN — Medication 2 PUFF: at 20:46

## 2019-05-12 RX ADMIN — LORAZEPAM 0.5 MG: 2 INJECTION INTRAMUSCULAR; INTRAVENOUS at 18:23

## 2019-05-12 RX ADMIN — MEROPENEM 1 G: 1 INJECTION, POWDER, FOR SOLUTION INTRAVENOUS at 17:49

## 2019-05-12 RX ADMIN — HALOPERIDOL LACTATE 2 MG: 5 INJECTION, SOLUTION INTRAMUSCULAR at 22:21

## 2019-05-12 RX ADMIN — FENTANYL CITRATE 50 MCG: 50 INJECTION INTRAMUSCULAR; INTRAVENOUS at 05:35

## 2019-05-12 RX ADMIN — FENTANYL CITRATE 50 MCG: 50 INJECTION INTRAMUSCULAR; INTRAVENOUS at 15:10

## 2019-05-12 RX ADMIN — METOPROLOL TARTRATE 10 MG: 5 INJECTION INTRAVENOUS at 10:39

## 2019-05-12 RX ADMIN — IPRATROPIUM BROMIDE 0.5 MG: 0.5 SOLUTION RESPIRATORY (INHALATION) at 20:27

## 2019-05-12 RX ADMIN — HALOPERIDOL LACTATE 2 MG: 5 INJECTION, SOLUTION INTRAMUSCULAR at 15:54

## 2019-05-12 RX ADMIN — I.V. FAT EMULSION 100 ML: 20 EMULSION INTRAVENOUS at 18:00

## 2019-05-12 RX ADMIN — PANTOPRAZOLE SODIUM 40 MG: 40 INJECTION, POWDER, FOR SOLUTION INTRAVENOUS at 08:49

## 2019-05-12 ASSESSMENT — ENCOUNTER SYMPTOMS
SHORTNESS OF BREATH: 0
NAUSEA: 0
CONSTIPATION: 0
DIARRHEA: 1
ABDOMINAL PAIN: 0
VOMITING: 0

## 2019-05-12 ASSESSMENT — PAIN SCALES - GENERAL
PAINLEVEL_OUTOF10: 9
PAINLEVEL_OUTOF10: 10
PAINLEVEL_OUTOF10: 6
PAINLEVEL_OUTOF10: 9
PAINLEVEL_OUTOF10: 10
PAINLEVEL_OUTOF10: 10
PAINLEVEL_OUTOF10: 9
PAINLEVEL_OUTOF10: 10
PAINLEVEL_OUTOF10: 6
PAINLEVEL_OUTOF10: 10
PAINLEVEL_OUTOF10: 9
PAINLEVEL_OUTOF10: 10
PAINLEVEL_OUTOF10: 4
PAINLEVEL_OUTOF10: 7
PAINLEVEL_OUTOF10: 3

## 2019-05-12 NOTE — ANESTHESIA PRE PROCEDURE
Department of Anesthesiology  Preprocedure Note       Name:  Rosangela Malone   Age:  77 y.o.  :  1950                                          MRN:  [id number]         Date:  2019      Surgeon: Eliu Jackson):  Zafar Lyons DO    Procedure: LAPAROSCOPY EXPLORATORY (N/A )    Medications prior to admission:   Prior to Admission medications    Medication Sig Start Date End Date Taking? Authorizing Provider   oxyCODONE-acetaminophen (PERCOCET) 5-325 MG per tablet Take 1 tablet by mouth every 6 hours as needed for Pain. Yes Historical Provider, MD   senna-docusate (PERICOLACE) 8.6-50 MG per tablet Take 1 tablet by mouth 2 times daily   Yes Historical Provider, MD   budesonide-formoterol (SYMBICORT) 160-4.5 MCG/ACT AERO Inhale 2 puffs into the lungs 2 times daily   Yes Historical Provider, MD   sucralfate (CARAFATE) 1 GM/10ML suspension Take 1 g by mouth 4 times daily    Yes Historical Provider, MD   traZODone (DESYREL) 50 MG tablet Take 50 mg by mouth nightly   Yes Historical Provider, MD   tiZANidine (ZANAFLEX) 2 MG tablet Take 2 mg by mouth every 8 hours as needed (left knee)    Yes Historical Provider, MD   aspirin 81 MG tablet Take 81 mg by mouth daily   Yes Historical Provider, MD   clopidogrel (PLAVIX) 75 MG tablet TAKE 1 TABLET DAILY 3/31/15  Yes Dot Fontaine DO   DOCQLACE 100 MG capsule TAKE 1 CAPSULE BY MOUTH IN THE MORNING & IN THE EVENING -[address] [place] [address] TAKING THIS MEDICINE 3/31/15  Yes Dot Fontaine DO   amLODIPine (NORVASC) 10 MG tablet Take 10 mg by mouth daily. Yes Historical Provider, MD   LORazepam (ATIVAN) 0.5 MG tablet Take 0.5 mg by mouth every 6 hours as needed for Anxiety. Yes Historical Provider, MD   therapeutic multivitamin-minerals (THERAGRAN-M) tablet Take 1 tablet by mouth daily. Yes Historical Provider, MD   omeprazole (PRILOSEC) 20 MG capsule Take 20 mg by mouth daily.    Yes Historical Provider, MD   venlafaxine (EFFEXOR) 37.5 MG tablet Take 37.5 mg by mouth 3 times daily. Yes Historical Provider, MD   Misc. Devices [facility]) [address] [place] wheelchair with left fupper extremity support  Dx: stroke with left hemiparesis.  7/24/17   Dino Morales MD       Current medications:    Current Facility-Administered Medications   Medication Dose Route Frequency Provider Last Rate Last Dose    haloperidol lactate (HALDOL) injection 2 mg  2 mg Intravenous Q4H PRN Milo Overton MD   2 mg at 05/12/19 0943    metoprolol (LOPRESSOR) injection 5 mg  5 mg Intravenous Q12H Alexsander Reyes MD        pantoprazole (PROTONIX) injection 40 mg  40 mg Intravenous Daily Alexsander Reyes MD   40 mg at 05/12/19 0849    And    sodium chloride (PF) 0.9 % injection 10 mL  10 mL Intravenous Daily Alexsander Reyes MD   10 mL at 05/12/19 0850    dextrose 5 % and 0.45 % NaCl with KCl 20 mEq infusion   Intravenous Continuous Milo Overton MD 10 mL/hr at 05/12/19 0941 10 mL/hr at 05/12/19 0941    PN-Adult 2-in-1 Central Line (Standard)   Intravenous Continuous TPN Yue Fraga DO 65 mL/hr at 05/11/19 1734      dexmedetomidine (PRECEDEX) 400 mcg in sodium chloride 0.9 % 100 mL infusion  0.2 mcg/kg/hr Intravenous Continuous Seymour Rausch MD   Stopped at 05/12/19 0711    alteplase (CATHFLO) injection 1 mg  1 mg Intracatheter Once Marlowe Lundborg, APRN - CNP        norepinephrine (LEVOPHED) 16 mg in dextrose 5 % 250 mL infusion  2 mcg/min Intravenous Continuous Milo Overton MD   Stopped at 05/12/19 0900    levalbuterol (XOPENEX) nebulizer solution 1.25 mg  1.25 mg Nebulization Q4H Milo Overton MD   1.25 mg at 05/11/19 0657    ipratropium (ATROVENT) 0.02 % nebulizer solution 0.5 mg  0.5 mg Nebulization Q4H Milo Overton MD   0.5 mg at 05/11/19 0657    heparin (porcine) injection 5,000 Units  5,000 Units Subcutaneous BID Milo Overton MD   5,000 Units at 05/11/19 2002    meropenem (MERREM) 1 g in sodium chloride 0.9 % 100 mL IVPB (mini-bag)  1 g Intravenous Q8H Robert Mario  mL/hr at 05/12/19 1035 1 g at 05/12/19 1035    vancomycin (VANCOCIN) 750 mg in dextrose 5 % 250 mL IVPB  750 mg Intravenous Q12H Robert Mario MD   Stopped at 05/12/19 0114    vancomycin (VANCOCIN) intermittent dosing (placeholder)   Other RX Placeholder Robert Mario MD        diatrizoate meglumine-sodium (GASTROGRAFIN) 66-10 % solution 360 mL  360 mL Oral ONCE PRN Howie Estrada DO   360 mL at 05/09/19 1031    sodium chloride (OCEAN, BABY AYR) 0.65 % nasal spray 1 spray  1 spray Each Nare Q2H PRN Zijonathan Latif APRN - CNP        sodium chloride flush 0.9 % injection 10 mL  10 mL Intravenous PRN Nicolas Saenz MD   10 mL at 05/08/19 1350    morphine (PF) injection 2 mg  2 mg Intravenous Q4H PRN Job Scheuermann, MD   2 mg at 05/12/19 0223    LORazepam (ATIVAN) injection 0.5 mg  0.5 mg Intravenous Q6H PRN Nicolas Saenz MD   0.5 mg at 05/09/19 2142    ondansetron (ZOFRAN) injection 4 mg  4 mg Intravenous Q6H PRN Nicolas Saenz MD   4 mg at 05/08/19 2143    zolpidem (AMBIEN) tablet 5 mg  5 mg Oral Nightly PRN Nicolas Saenz MD   5 mg at 05/04/19 2326    sodium chloride flush 0.9 % injection 10 mL  10 mL Intravenous PRN Nicolas Saenz MD   10 mL at 05/03/19 1408    mometasone-formoterol (DULERA) 200-5 MCG/ACT inhaler 2 puff  2 puff Inhalation BID Nicolas Saenz MD   2 puff at 05/08/19 0819    [Held by provider] metoprolol tartrate (LOPRESSOR) tablet 12.5 mg  12.5 mg Oral BID Nicolas Saenz MD   12.5 mg at 05/07/19 2103    fat emulsion 20 % infusion 100 mL  100 mL Intravenous Daily Everardo José MD   Stopped at 05/12/19 0534    hydrOXYzine (ATARAX) tablet 50 mg  50 mg Oral 4x Daily PRN Nicolas Saenz MD   50 mg at 04/27/19 0538    metoprolol (LOPRESSOR) injection 10 mg  10 mg Intravenous Q4H PRN Nicolas Saenz MD   10 mg at 05/12/19 1039    magnesium sulfate 1 g in dextrose 5% 100 mL IVPB  1 g Intravenous Once Nicolas Saenz MD        insulin lispro (HUMALOG) injection vial 0-12 Units  0-12 Units Subcutaneous Q6H Nicolas Saenz MD   2 Units at 05/11/19 0024    sodium chloride nebulizer 0.9 % solution 3 mL  3 mL Nebulization Q8H PRN Nicolas Saenz MD        fentaNYL (SUBLIMAZE) injection 50 mcg  50 mcg Intravenous Q2H PRN Nicolas Saenz MD   50 mcg at 05/12/19 0943    oxyCODONE-acetaminophen (PERCOCET) 5-325 MG per tablet 1 tablet  1 tablet Oral Q4H PRN Nicolas Saenz MD   1 tablet at 05/08/19 0527    magnesium sulfate 1 g in dextrose 5% 100 mL IVPB  1 g Intravenous PRN Tamara Padilla MD   Stopped at 04/17/19 0857    sodium phosphate 7.41 mmol in dextrose 5 % 250 mL IVPB  0.16 mmol/kg Intravenous PRN Tamara Padilla MD   Stopped at 04/17/19 2008    Or    sodium phosphate 14.85 mmol in dextrose 5 % 250 mL IVPB  0.32 mmol/kg Intravenous PRN Tamara Padilla MD   Stopped at 04/17/19 1231    potassium chloride (KLOR-CON M) extended release tablet 40 mEq  40 mEq Oral PRN Nicolas Saenz MD   40 mEq at 05/03/19 1027    Or    potassium bicarb-citric acid (EFFER-K) effervescent tablet 40 mEq  40 mEq Oral PRN Nicolas Saenz MD   40 mEq at 04/21/19 0703    Or    potassium chloride 10 mEq/100 mL IVPB (Peripheral Line)  10 mEq Intravenous PRN Nicolas Saenz  mL/hr at 05/12/19 1020 10 mEq at 05/12/19 1020    glucose (GLUTOSE) 40 % oral gel 15 g  15 g Oral PRN Nicolas Saenz MD        dextrose 50 % solution 12.5 g  12.5 g Intravenous PRN Nicolas Saenz MD        glucagon (rDNA) injection 1 mg  1 mg Intramuscular PRN Nicolas Saenz MD        dextrose 5 % solution  100 mL/hr Intravenous JENNIFERN Nicolas Saenz MD        milk and molasses enema 240 mL  240 mL Rectal Daily PRN Nicolas Saenz MD   240 mL at 04/12/19 1745    venlafaxine (EFFEXOR) tablet 37.5 mg  37.5 mg Oral TID Nicolas Saenz MD   37.5 mg at 05/07/19 2946    aspirin EC tablet 81 mg  81 mg Oral Daily Nicolas Saenz MD   81 mg at 05/05/19 0857    sodium chloride flush 0.9 % injection 10 mL  10 mL Intravenous 2 times per day Nicolas Saenz MD   10 mL at 05/09/19 0840    sodium chloride flush 0.9 % GASTROINTESTINAL ENDOSCOPY N/A 4/16/2019    EGD ESOPHAGOGASTRODUODENOSCOPY @ BEDSIDE  ICU 2002 performed by Arianna Moran MD at [address] History:    Social History     Tobacco Use    Smoking status: Current Some Day Smoker     Packs/day: 1.00     Years: 30.00     Pack years: 30.00    Smokeless tobacco: Never Used   Substance Use Topics    Alcohol use: Not Currently     Alcohol/week: 16.8 oz     Types: 28 Glasses of wine per week                                Ready to quit: Not Answered  Counseling given: Not Answered      Vital Signs (Current):   Vitals:    05/12/19 0915 05/12/19 0930 05/12/19 0945 05/12/19 1000   BP: 134/64 113/82 106/63 126/80   Pulse: 117 131 130 126   Resp: 29 (!) 38 25 (!) 32   Temp:       TempSrc:       SpO2: 99% (!) 83% 93% 96%   Weight:       Height:                                                  BP Readings from Last 3 Encounters:   05/12/19 126/80   04/02/19 129/81   03/30/19 120/69       NPO Status: Time of last liquid consumption: 2100                        Time of last solid consumption: 2100                        Date of last liquid consumption: 05/05/19                        Date of last solid food consumption: 05/05/19    BMI:   Wt Readings from Last 3 Encounters:   05/12/19 89 lb 8 oz (40.6 kg)   03/30/19 89 lb (40.4 kg)   03/28/19 94 lb 1.6 oz (42.7 kg)     Body mass index is 17.48 kg/m².     CBC:   Lab Results   Component Value Date    WBC 14.9 05/12/2019    RBC 3.28 05/12/2019    RBC 3.66 05/23/2012    HGB 10.2 05/12/2019    HCT 32.8 05/12/2019    MCV 86.9 05/12/2019    RDW 17.1 05/12/2019     05/12/2019     05/23/2012       CMP:   Lab Results   Component Value Date     05/12/2019    K 3.3 05/12/2019     05/12/2019    CO2 23 05/12/2019    BUN 33 05/12/2019    CREATININE <0.40 05/12/2019    GFRAA CANNOT BE CALCULATED 05/12/2019    LABGLOM CANNOT BE CALCULATED 05/12/2019    GLUCOSE 113 05/12/2019    GLUCOSE 87 05/21/2012    PROT 5.9 05/11/2019    CALCIUM 7.5 05/12/2019    BILITOT 0.38 05/11/2019    ALKPHOS 196 05/11/2019    AST 19 05/11/2019    ALT 18 05/11/2019       POC Tests:   Recent Labs     05/12/19  0015   POCGLU 125*       Coags:   Lab Results   Component Value Date    PROTIME 16.6 05/12/2019    PROTIME 10.9 03/28/2012    INR 1.3 05/12/2019    APTT 27.7 03/28/2012       HCG (If Applicable): No results found for: PREGTESTUR, PREGSERUM, HCG, HCGQUANT     ABGs:   Lab Results   Component Value Date    PHART 7.370 05/10/2019    PO2ART 68.2 05/10/2019    RSL6WZR 37.1 05/10/2019    SCW2XHO 21.4 05/10/2019    O8XEYLWL 92.6 05/10/2019        Type & Screen (If Applicable):  No results found for: [facility]    Anesthesia Evaluation  Patient summary reviewed and Nursing notes reviewed  Airway:   TM distance: >3 FB   Neck ROM: full  Mouth opening: > = 3 FB Dental:          Pulmonary:normal exam    (+) COPD:                             Cardiovascular:    (+) hypertension:,       ECG reviewed  Rhythm: regular  Rate: normal  Echocardiogram reviewed               ROS comment: EF 45%     Neuro/Psych:   (+) CVA: residual symptoms, psychiatric history:             ROS comment: Left sided weakness GI/Hepatic/Renal:   (+) GERD:,           Endo/Other:                     Abdominal:           Vascular:                                        Anesthesia Plan      general     ASA 4 - emergent       Induction: intravenous. MIPS: Postoperative opioids intended and Prophylactic antiemetics administered. Anesthetic plan and risks discussed with patient.                       Leatha Dobson MD   5/12/2019

## 2019-05-12 NOTE — PLAN OF CARE
Problem: Risk for Impaired Skin Integrity  Goal: Tissue integrity - skin and mucous membranes  Description  Structural intactness and normal physiological function of skin and  mucous membranes. 5/12/2019 0447 by Michael Goff RN  Outcome: Ongoing  5/11/2019 1604 by Oliver Nobles RN  Outcome: Ongoing   Patient turned q 2 hours when in bed. Pressure redistribution device(s) in use. Barrier cream applied when providing kimmy care. Problem: Falls - Risk of:  Goal: Will remain free from falls  Description  Will remain free from falls  5/12/2019 0447 by Michael Goff RN  Outcome: Ongoing  5/11/2019 1604 by Oliver Nobles RN  Outcome: Met This Shift   Pt remains free from falls. Bed alarm on. Call light within reach. Problem: Infection, Septic Shock:  Goal: Will show no infection signs and symptoms  Description  Will show no infection signs and symptoms  5/11/2019 1604 by Oliver Nobles RN  Outcome: Ongoing     Problem: Tissue Perfusion, Altered:  Goal: Circulatory function within specified parameters  Description  Circulatory function within specified parameters  5/11/2019 1604 by Oliver Nobles RN  Outcome: Ongoing     Problem: Pain:  Goal: Pain level will decrease  Description  Pain level will decrease  5/12/2019 0447 by Michael Goff RN  Outcome: Ongoing  5/11/2019 1604 by Oliver Nobles RN  Outcome: Ongoing  Goal: Control of acute pain  Description  Control of acute pain  5/11/2019 1604 by Oliver Nobles RN  Outcome: Ongoing  Goal: Control of chronic pain  Description  Control of chronic pain  5/11/2019 1604 by Oliver Nobles RN  Outcome: Ongoing   Pt's pain assessed using the 0-10 pain scale. Pt medicated as needed with morphine. Problem: Nutrition  Goal: Optimal nutrition therapy  Description       5/12/2019 0447 by Michael Goff RN  Outcome: Ongoing  5/11/2019 1604 by Oliver Nobles RN  Outcome: Ongoing   Pt receiving TPN nutrition being carefully managed by Dietician.

## 2019-05-12 NOTE — PROGRESS NOTES
Dr Kristi Hernandez notified of patient's return to floor because she refused surgery today. Provider reminded of two bloody stools and no new orders were received at this time. No planned procedures at this time.

## 2019-05-12 NOTE — FLOWSHEET NOTE
05/12/19 1537   Provider Notification   Reason for Communication Evaluate; Review case   Provider Name Resident   Provider Notification Resident   Method of Communication Face to face   Response At bedside       Provider at bedside per patient request. Patient completely alert and oriented x 4. Patient requesting to go to hospice after hearing what the hospice rep and the physicians have to say about her options as far as surgery and code status change go. Patient has expressed wishes to go to hospice and for the [facility] code status change. NG is removed at this time and clear liquid diet is initiated. Hospice called and reps will meet with patient tomorrow (5/13) as well as with social work to discuss admission at 1300. Care Transitions Progress note    Reason for Follow up: This writer spoke to the patients mother Max regarding contacting the patients [facility]  Tasia ([phone]) to inquire about possible PCA services. Tasia is out of the office through 11/26/2018, left . Will try to contact Tasia tomorrow 11/27/2018.  This writer left a VM with Max to update her of writers plan to follow up with [facility]  on 11/27/2018    Anticipated DC Needs: [facility] Palliate care and working towards [facility] PCA services.    Next Steps: Awaiting return call from [facility].    Yudelka Huggins RN Care Coordinator  [facility] [phone]  [facility] [phone]

## 2019-05-12 NOTE — PROGRESS NOTES
Patient was agreeable to have surgery and signed consent  She again changed her mind and now states she does not want surgery  I explained to her that she most likely will not survive without surgery, she said she understood and still does not want to proceed    Will follow as needed  GI needs to be notified regarding GI bleed as the patient will most likely need endoscopy

## 2019-05-12 NOTE — CODE DOCUMENTATION
Resuscitation/Code Status Note on Juliet Avalos (YOB: 1950)    At 15:19 on May 12, 2019, resuscitation/code status decision was based on a signed valid DNR Identification Form. The code status was made DNR-CC No additional code details.     Electronically signed by Balta Schwartz MD on 5/12/19 at 3:19 PM

## 2019-05-12 NOTE — FLOWSHEET NOTE
05/12/19 0957   Provider Notification   Reason for Communication Evaluate; Review case   Provider Name Dr Mandy Flores   Provider Notification Physician   Method of Communication Secure Message   Response At bedside     Provider made aware of patient's approval for surgery. Consent signed with witnesses by resident and senior for primary team. Patient recently had 2 large bloody stools and a stat hemoglobin was ordered at that time. Hemoglobin was 9.0 @ 0436 and repeat was 10.2 @ 0936. Lactic ordered at this time.

## 2019-05-12 NOTE — PROGRESS NOTES
Hospice at bedside discussing care options with patient primary care. Patient continuously stating that she wanted everyone to leave her alone. Refusing to listen or address health issues. Stating she does not want surgery but wants to remain a full code. Provider at bedside expresses that due to her current health status and her refusal for possible life saving surgery it does not make sense for the current code status to be maintained for future possible pain and suffering. Patient states she wants to talk to her brother about decisions but then denies wanting to talk to her brother when physicians leave the room.

## 2019-05-12 NOTE — FLOWSHEET NOTE
Patient was anxious about surgery. Prayers said. No other needs expressed. Chaplains will remain available to offer spiritual and emotional support as needed.      05/12/19 1210   Encounter Summary   Services provided to: Patient   Referral/Consult From: Nurse   Continue Visiting   (5/12/19)   Complexity of Encounter Low   Length of Encounter 15 minutes   Routine   Type Pre-procedure   Assessment Approachable;Passive   Intervention Active listening;Explored feelings, thoughts, concerns;Prayer;Sustaining presence/ Ministry of presence   Outcome Expressed gratitude

## 2019-05-12 NOTE — CARE COORDINATION
ONGOING DISCHARGE PLAN:    Patient from [facility] and doesn't want to return and LSW following for [facility]. Patient declining surgery, Hospice NWO Consulted and patient made DNR-CC today. JADEN crane and vanco.     Will continue to follow for additional discharge needs.     Electronically signed by Aneta Coleman RN on 5/12/2019 at 3:26 PM

## 2019-05-12 NOTE — PROGRESS NOTES
Pt to room 2066 at this time. Pt alert and oriented. Vital signs as charted. MEWS score of 4 noted, DNR-CC noted, not further action needed. Pt repositioned for comfort. Will continue to monitor.

## 2019-05-12 NOTE — PROGRESS NOTES
[address] [facility]    PROGRESS NOTE             5/12/2019    7:13 AM    Name:   Chloe Alexander  MRN:     [id number]     Acct:      [de-identified]   Room:   2006/2006-01  IP Day:  32  Admit Date:  4/11/2019  2:48 PM    PCP:  Macy Laguna DO  Code Status:  Limited    Subjective:     C/C:   Chief Complaint   Patient presents with    Abdominal Pain     Interval History Status: not changed. Patient seen bedside on ICU. No acute events overnight. VSS. Today patient is agreeable to surgery. Consent signed. Precedex discontinued. Haldol q6h. Per pulm, IV fluids 100cc/hr. Brief History:     Dilcia Amaral is a 77 y.o. female who was admitted for the management of  sepsis 2/2 UTI and pneumonia. Pt developed cholecystitis, GI bleed, SBO, ileus, refusing surgical intervention.        Review of Systems:     Review of Systems   Constitutional: Positive for fatigue. Negative for fever. HENT:        Dry mouth    Eyes: Negative for visual disturbance. Respiratory: Negative for shortness of breath. Cardiovascular: Negative for chest pain and palpitations. Gastrointestinal: Positive for diarrhea. Negative for abdominal pain, constipation, nausea and vomiting. Genitourinary: Negative for flank pain. Musculoskeletal: Negative for myalgias. Neurological: Negative for weakness and headaches. Medications: Allergies:     Allergies   Allergen Reactions    Penicillins Shortness Of Breath and Rash    Amoxicillin        Current Meds:   Scheduled Meds:    metoprolol  5 mg Intravenous Q12H    pantoprazole  40 mg Intravenous Daily    And    sodium chloride (PF)  10 mL Intravenous Daily    alteplase  1 mg Intracatheter Once    levalbuterol  1.25 mg Nebulization Q4H    ipratropium  0.5 mg Nebulization Q4H    heparin (porcine)  5,000 Units Subcutaneous BID    meropenem  1 g Intravenous Q8H    vancomycin  750 mg interstitial disease and edema. Airspace disease is not excluded. Xr Chest (single View Frontal)    Result Date: 4/13/2019  EXAMINATION: SINGLE XRAY VIEW OF THE CHEST 4/13/2019 7:18 am COMPARISON: April 12, 2019 HISTORY: ORDERING SYSTEM PROVIDED HISTORY: dyspnea TECHNOLOGIST PROVIDED HISTORY: dyspnea Ordering Physician Provided Reason for Exam: dyspnea Acuity: Acute Type of Exam: Subsequent/Follow-up Additional signs and symptoms: dyspnea FINDINGS: A right IJ catheter is seen with its tip terminating at the superior cavoatrial junction. The left chest wall pacemaker and leads are stable. The cardiomediastinal silhouette is stable. There is interval increased opacity at the right lung base, may be related to atelectasis versus pneumonia. There is small atelectasis and mild pleural effusion at the left lung base. There is no pneumothorax. There is no acute osseous abnormality. Interval increased opacity at the right lung base, may be related to atelectasis versus pneumonia. Small atelectasis and mild pleural effusion at the left lung, new since the prior study. Xr Chest Standard (2 Vw)    Result Date: 4/29/2019  EXAMINATION: TWO VIEWS OF THE CHEST 4/29/2019 8:04 pm COMPARISON: 04/27/2019 HISTORY: ORDERING SYSTEM PROVIDED HISTORY: Follow-up lung infiltrate TECHNOLOGIST PROVIDED HISTORY: Follow-up lung infiltrate Ordering Physician Provided Reason for Exam: Follow-up infiltrate. Pt unable to lean forward - unable to get proper sponge behind pt for lateral. Pt unable to raise left arm at all for lateral. Best possible lateral obtained. Acuity: Unknown Type of Exam: Unknown Additional signs and symptoms: Follow-up infiltrate. Pt unable to lean forward - unable to get proper sponge behind pt for lateral. Pt unable to raise left arm at all for lateral. Best possible lateral obtained. FINDINGS: Left chest cardiac pacemaker device is in place.   Right IJ central venous catheter in place with distal tip at the cavoatrial junction. Heart size is within normal limits. No vascular congestion. There are small to moderate pleural effusions. There are interstitial opacities in the upper lungs, which could be related to scarring and/or developing infiltrate, slightly improved in appearance when compared to 04/27/2019. No evidence of pneumothorax. 1.  Small to moderate bilateral pleural effusions, better visualized on lateral view. 2.  Upper lobe interstitial opacities, slightly improved when compared to the previous study, possibly related to underlying fibrotic change or residual infiltrate. Xr Knee Left (1-2 Views)    Result Date: 5/8/2019  EXAMINATION: 2 XRAY VIEWS OF THE LEFT KNEE 5/7/2019 11:19 am COMPARISON: Left knee radiographs 03/30/2019. HISTORY: ORDERING SYSTEM PROVIDED HISTORY: fracture TECHNOLOGIST PROVIDED HISTORY: fracture Ordering Physician Provided Reason for Exam: left knee/distal femur pain Acuity: Acute Type of Exam: Initial FINDINGS: Bones are osteopenic. No suprapatellar joint effusion. There is a impacted, transverse fracture of the distal femoral metadiaphysis. Increased sclerosis along the fracture line suggests interval healing. Fracture fragments are in unchanged, near anatomic alignment. No acute dislocation. No new fracture is seen. Impacted, transverse fracture of the distal femoral metadiaphysis is in unchanged alignment and demonstrates interval healing.      Xr Abdomen (kub) (single Ap View)    Result Date: 5/10/2019  EXAMINATION: ONE SUPINE XRAY VIEW(S) OF THE ABDOMEN 5/10/2019 9:14 am COMPARISON: Small-bowel series 05/09/2019 HISTORY: ORDERING SYSTEM PROVIDED HISTORY: Small bowel obstruction ([address] TECHNOLOGIST PROVIDED HISTORY: TO ACCOMPANY SMALL BOWEL EXAM SMALL BOWEL OBSTRUCTION Ordering Physician Provided Reason for Exam: SMALL BOWEL FOLLOW THRU - 20 HOUR DELAYED FILM FINDINGS: Significant interval decreased amount of retained contrast in the stomach compared to last image from earlier small bowel series which likely relates to suctioning of the contrast.  Majority of previously seen contrast within the pelvis likely in the rectum is no longer visualized and has likely passed through. Residual contrast remains within the colon and small bowel. NG tube is in place with side hole in the region of the GE junction. Partially visualized mild left pleural effusion associated atelectasis. 1. Interval presumed suctioning of contrast from the stomach which now appears relatively empty. Continued progression of contrast through the small and large bowel as described. 2. NG tube side hole at the level of the GE junction, consider advancement of 2-3 cm. Xr Abdomen (kub) (single Ap View)    Result Date: 5/8/2019  EXAMINATION: SINGLE SUPINE XRAY VIEW(S) OF THE ABDOMEN 5/8/2019 8:59 am COMPARISON: CT abdomen from 05/05/2019, and KUB from 04/19/2019 HISTORY: ORDERING SYSTEM PROVIDED HISTORY: recheck obstruction TECHNOLOGIST PROVIDED HISTORY: recheck obstruction Ordering Physician Provided Reason for Exam: recheck obstruction Acuity: Unknown Type of Exam: Unknown FINDINGS: Again noted cholecystostomy tube, electrode leads from a pacemaker overlying the heart, and overlying electrode leads/tubing. NG tube no longer demonstrated. Gas-filled bowel loops without marked dilatation; cecum measures 8 cm, slightly increased as compared to the previous study. No obvious free air. No mass or organomegaly. Bones unchanged. Retrocardiac opacity, hyperinflated lungs and probable pleural effusions. NG tube removed. Gas-filled bowel more suggestive ileus; no clear cut obstruction. Cecum measures 8 cm. Cholecystostomy tube in unchanged position. Additional unchanged findings, as above.  RECOMMENDATION: Continued clinical correlation and follow-up     Xr Abdomen (kub) (single Ap View)    Result Date: 4/19/2019  EXAMINATION: SINGLE SUPINE XRAY VIEW(S) OF THE ABDOMEN 4/19/2019 9:48 am COMPARISON: April 14, 2019 HISTORY: ORDERING SYSTEM PROVIDED HISTORY: pain TECHNOLOGIST PROVIDED HISTORY: pain Ordering Physician Provided Reason for Exam: Abdominal pain and distentsion Acuity: Acute Type of Exam: Initial Additional signs and symptoms: Abdominal pain and distentsion FINDINGS: Enteric tube with the tip within the stomach. Small left pleural effusion. Minimal right pleural effusion. Mildly dilated loops of bowel. Nonspecific bowel gas pattern with mildly dilated loops of bowel. Xr Abdomen (kub) (single Ap View)    Result Date: 4/14/2019  EXAMINATION: SINGLE SUPINE XRAY VIEW(S) OF THE ABDOMEN 4/14/2019 7:39 am COMPARISON: CT abdomen and pelvis  film from 11 April 2019 HISTORY: [address] [facility] [address]: Abd Distention TECHNOLOGIST PROVIDED HISTORY: Abd Distention Ordering Physician Provided Reason for Exam: Abdominal distention. Pt was moving around in the bed. Best films at present time. Acuity: Acute Type of Exam: Initial Additional signs and symptoms: Abdominal distention. Pt was moving around in the bed. Best films at present time. FINDINGS: Portable view time stamped at 748 hours demonstrates an intestinal tube terminating in the midportion of a gaseous Spike dilated stomach. Densities are present over the stomach likely medication. Bipolar pacemaker is in situ with intact leads. Heart size is top-normal, stable. Gaseous distension of the stomach and loop of bowel in the upper mid abdomen is noted but there is gas and fecal material in the rectum. Gastric outlet obstruction or proximal small bowel partial obstruction is suspected. No free air is noted. Midline city is present over the pelvis likely a monitor or CT small bore catheter. Vascular calcification is present in the pelvis. Persistent preferential gaseous distension of the stomach although there is some gas in the upper abdomen and gas and fecal material present in the rectosigmoid.   Findings suggest gastric outlet obstruction. Ct Abdomen W Contrast Additional Contrast? Radiologist Recommendation    Result Date: 5/5/2019  EXAMINATION: CT ABDOMEN WITH CONTRAST, 5/5/2019 3:38 pm TECHNIQUE: CT of the abdomen was performed with the administration of intravenous contrast. Multiplanar reformatted images are provided for review. Dose modulation, iterative reconstruction, and/or weight based adjustment of the mA/kV was utilized to reduce the radiation dose to as low as reasonably achievable. COMPARISON: April 14, 2019 HISTORY: ORDERING SYSTEM PROVIDED HISTORY: Check for abscess/fluid collection around ashley tube site. TECHNOLOGIST PROVIDED HISTORY: Ordering Physician Provided Reason for Exam: Patient c/o abd pain around her ashley site and drainage tube. FINDINGS: Evaluation is limited by motion. Lower Chest: Moderate bilateral pleural effusions. Organs: Multiple liver hypodensities measuring up to 4 mm are incompletely characterized but in the absence of risk factors likely represent cysts requiring no specific follow-up. Cholecystostomy tube is in place. No adjacent focal drainable fluid collection. No pancreatic lesion. No splenomegaly. No adrenal lesion. No hydronephrosis. No renal lesion. GI/Bowel: Stomach is markedly distended. Swirled appearance of the mesentery is seen within the mid to lower abdomen with adjacent thickened loops of small bowel. Peritoneum/Retroperitoneum: Atherosclerotic calcification of the abdominal aorta without aneurysmal dilatation. No adenopathy. Bones/Soft Tissues: No acute soft tissue abnormality. Diffuse osteopenia. Lumbar spine degenerative changes. Bowel obstruction which is suspected to be caused by an internal hernia. Cholecystostomy tube is in place. No surrounding fluid collection.      Nm Gi Blood Loss    Result Date: 5/3/2019  EXAMINATION: NUCLEAR MEDICINE GASTRIC BLEEDING STUDY 5/3/2019 TECHNIQUE: Following the intravenous injection of 23.8 mCi of 99 mTc-labeled RBC's, a flow study and standard images of the abdomen was obtained over a total period of 60 minutes COMPARISON: None. HISTORY: ORDERING SYSTEM PROVIDED HISTORY: GI BLEEDING TECHNOLOGIST PROVIDED HISTORY: Ordering Physician Provided Reason for Exam: GI bleeding Acuity: Unknown Type of Exam: Unknown FINDINGS: Activity is seen within the blood pool, including the great vessels, heart, liver, and spleen. There was no abnormal accumulation of radioactivity in the abdomen to suggest active bleeding during study acquisition. No evidence of active GI bleeding during acquisition. RECOMMENDATIONS: If the patient shows hemodynamic signs of an active bleed in the next 20 hours, additional images can be acquired. Kate Urbina Device Plmt/replace/removal    Result Date: 4/30/2019  PROCEDURE: ULTRASOUND GUIDED VASCULAR ACCESS. FLUOROSCOPY GUIDED PICC PLACEMENT 4/30/2019. HISTORY: ORDERING SYSTEM PROVIDED HISTORY: TPN TECHNOLOGIST PROVIDED HISTORY: TPN Lumen?->Double Lumen SEDATION: None FLUOROSCOPY DOSE AND TYPE OR TIME AND EXPOSURES: 7 seconds; D  TECHNIQUE: This procedure was performed by Dr. Leandro Delgado. Informed consent was obtained after a detailed explanation of the procedure including risks, benefits, and alternatives. Universal protocol was observed. The right arm was prepped and draped in sterile fashion using maximum sterile barrier technique. Local anesthesia was achieved with lidocaine. A micropuncture needle was used to access the right basilic vein using ultrasound guidance. An ultrasound image demonstrating patency of the vein with needle tip located within it. An image was obtained and stored in PACs. A 0.018 guidewire was used to place a peel-a-way sheath and a 5 Norwegian dual-lumen PICC was advanced with fluoroscopic guidance with the tip at the cavo-atrial junction. The catheter flushed easily and there was a good blood return. The catheter was secured to the skin.   The patient tolerated the procedure well and there were no immediate complications. FINDINGS: Fluoroscopic image demonstrates the tip of the catheter at the cavo-atrial junction. Successful ultrasound and fluoroscopy guided PICC placement     Us Gallbladder Ruq    Result Date: 4/19/2019  EXAMINATION: RIGHT UPPER QUADRANT ULTRASOUND 4/19/2019 10:48 am COMPARISON: CT abdomen and pelvis 4/14/2018 HISTORY: ORDERING SYSTEM PROVIDED HISTORY: ABDOMINAL PAIN FINDINGS: Pancreas is not well visualized. No liver lesion. Gallbladder wall thickening. Gallbladder sludge. Cholelithiasis. Pericholecystic fluid. Common bile duct measures at the upper limits of normal at 7 mm. Findings suggesting acute cholecystitis. Xr Chest Portable    Result Date: 5/10/2019  EXAMINATION: ONE XRAY VIEW OF THE CHEST 5/10/2019 1:28 am COMPARISON: May 2, 2019. HISTORY: ORDERING SYSTEM PROVIDED HISTORY: low o2 sat TECHNOLOGIST PROVIDED HISTORY: low o2 sat Ordering Physician Provided Reason for Exam: Low o2 sat Acuity: Acute Type of Exam: Initial FINDINGS: Hyperexpanded right lung volume. Left greater than right basilar heterogeneous opacities with left basilar consolidation. Small bilateral pleural effusions. Stable cardiomediastinal silhouette and great vessels. Enteric contrast in the stomach and distal esophagus. Enteric tube courses into the stomach but tip is not definitely seen due to contrast in the stomach. Stable left pectoral cardiac pacer device. Stable right PICC. Left greater than right basilar heterogeneous opacities with left basilar consolidation. Differential considerations include atelectasis and an infectious/inflammatory process. Small bilateral pleural effusions. Enteric contrast in the stomach and distal esophagus. Enteric tube courses into the stomach but tip is not definitely seen due to contrast in the stomach.      Xr Chest Portable    Result Date: 4/27/2019  EXAMINATION: SINGLE XRAY VIEW OF THE CHEST 4/27/2019 8:47 am COMPARISON: 04/26/2019 HISTORY: ORDERING SYSTEM PROVIDED HISTORY: Assess for pulm edema vs PNA TECHNOLOGIST PROVIDED HISTORY: Assess for pulm edema vs PNA Ordering Physician Provided Reason for Exam: cough, congestion Acuity: Acute Type of Exam: Initial FINDINGS: Right IJ central venous catheter is in place tip in the SVC right atrial junction. Pacer wires are in place. The heart and mediastinal structures are stable. Pleural effusions are present with bibasilar atelectasis. Bilateral lung infiltrates are present in the upper lung fields. Persistent pleural effusions with bibasilar atelectasis and bilateral lung infiltrates with areas of consolidation developing in the upper lobes. Xr Chest Portable    Result Date: 4/26/2019  EXAMINATION: SINGLE XRAY VIEW OF THE CHEST 4/26/2019 6:51 am COMPARISON: April 24, 2019 HISTORY: ORDERING SYSTEM PROVIDED HISTORY: Pleural effusions TECHNOLOGIST PROVIDED HISTORY: Pleural effusions Ordering Physician Provided Reason for Exam: pleural effusion Acuity: Acute Type of Exam: Initial FINDINGS: Right IJ line in good position. Pacer device is stable. Cardiac leads overlie the chest.  Stable cardiomediastinal contours with calcified aortic arch. Left effusion and associated airspace disease, slightly decreased. Chronic blunting of right costophrenic sulcus may represent scarring or chronic effusion. Increased reticular markings right upper chest and left upper chest possibly interstitial edema or interstitial pneumonia. No new airspace consolidation. Increasing reticular markings upper chest bilaterally right greater than left possibly due to edema or interstitial pneumonitis. Improving left effusion with associated retrocardiac airspace disease. Pleural-parenchymal scarring or effusion in the right lung base, stable.      Xr Chest Portable    Result Date: 4/24/2019  EXAMINATION: SINGLE XRAY VIEW OF THE CHEST 4/24/2019 6:05 am COMPARISON: Chest radiograph dated 04/22/2019 HISTORY: ORDERING SYSTEM PROVIDED HISTORY: Acute respiratory failure, pleural effusion TECHNOLOGIST PROVIDED HISTORY: Acute respiratory failure, pleural effusion Ordering Physician Provided Reason for Exam: pleural effusion, respiratory failure. Acuity: Acute Type of Exam: Initial FINDINGS: Heart size is normal.  Dual-chamber pacemaker placed via left subclavian approach. Right internal jugular central line has tip in distal superior vena cava. Interval removal of nasogastric tube. No interval change of infiltrate and atelectasis at the left lung base. Stable mild infiltrate in the left upper lobe. Mild interval improvement of infiltrate at the right lung base. Stable small bilateral pleural effusions, left larger than right. Mild CHF, stable. 1.  No interval change of infiltrate and atelectasis at the left lung base. Stable mild infiltrate in the left upper lobe. 2.  Mild interval improvement of infiltrate at the right lung base. 3.  Stable small bilateral pleural effusions, left larger than right. 4.  Mild CHF, stable. Xr Chest Portable    Result Date: 4/22/2019  EXAMINATION: SINGLE XRAY VIEW OF THE CHEST 4/22/2019 6:44 am COMPARISON: April 21, 2019. HISTORY: ORDERING SYSTEM PROVIDED HISTORY: Hypoxia and CHF TECHNOLOGIST PROVIDED HISTORY: Hypoxia and CHF Ordering Physician Provided Reason for Exam: hx of hypoxia/CHF Acuity: Acute Type of Exam: Initial FINDINGS: Right IJ central venous catheter appears in unchanged position. Nasogastric tube courses below the diaphragm, with tip not imaged. Left chest wall pacemaker device projects in unchanged position. Cardiac and mediastinal contours are enlarged but unchanged. Unchanged bilateral pleural effusions and bibasilar pulmonary opacities. Unchanged findings suggestive of congestive heart failure. No evidence of pneumothorax. No new osseous abnormalities. 1. No significant change in findings suggestive of congestive heart failure.  2. Unchanged bilateral pleural effusions and bibasilar pulmonary consolidations. RECOMMENDATION: Radiographic follow-up to complete resolution. Xr Chest Portable    Result Date: 4/21/2019  EXAMINATION: SINGLE XRAY VIEW OF THE CHEST 4/21/2019 3:08 pm COMPARISON: April 19, 2019 HISTORY: ORDERING SYSTEM PROVIDED HISTORY: hypoxia TECHNOLOGIST PROVIDED HISTORY: hypoxia Ordering Physician Provided Reason for Exam: hypoxia Acuity: Unknown Type of Exam: Unknown FINDINGS: Enteric tube in the stomach. ET tube is been removed. Right IJ catheter terminates in the atrial caval junction. Bipolar pacer on the left unchanged. Heart and mediastinum unremarkable. Moderate edema unchanged. Small effusions, left greater than right. Bony thorax intact. Status post extubation. Life support apparatus otherwise stable. Moderate edema and effusions unchanged. Xr Chest Portable    Result Date: 4/19/2019  EXAMINATION: SINGLE XRAY VIEW OF THE CHEST 4/19/2019 5:51 am COMPARISON: April 18, 2019 HISTORY: ORDERING SYSTEM PROVIDED HISTORY: ETT placement TECHNOLOGIST PROVIDED HISTORY: ETT placement Ordering Physician Provided Reason for Exam: Vent Pt check ETT placement Acuity: Acute Type of Exam: Subsequent/Follow-up Additional signs and symptoms: Vent Pt check ETT placement FINDINGS: Tubes and lines are unchanged. Persistent bibasilar lung opacities and pleural effusions. Mild pulmonary edema. No significant interval change. Xr Chest Portable    Result Date: 4/18/2019  EXAMINATION: SINGLE XRAY VIEW OF THE CHEST 4/18/2019 6:21 am COMPARISON: April 17, 2019 HISTORY: ORDERING SYSTEM PROVIDED HISTORY: ETT placement TECHNOLOGIST PROVIDED HISTORY: ETT placement Ordering Physician Provided Reason for Exam: on vent Acuity: Acute Type of Exam: Initial FINDINGS: Right IJ line and NG tube are stable. Interval advancement of ET tube terminating 3.1 cm from ron. Implanted cardiac device is present.  Left-sided effusion and associated airspace disease is stable. Hazy right basilar opacity possibly edema or atelectasis. No pneumothorax. Increased opacity left upper chest possibly focal area of atelectasis, new from prior. Advanced ETT from prior exam, now 3.1 cm from ron. Increased opacity left upper chest suspicious for atelectasis. Additional supporting devices are stable. Xr Chest Portable    Result Date: 4/17/2019  EXAMINATION: SINGLE XRAY VIEW OF THE CHEST 4/17/2019 7:15 am COMPARISON: 16 April 2019 HISTORY: ORDERING SYSTEM PROVIDED HISTORY: ETT placement TECHNOLOGIST PROVIDED HISTORY: ETT placement Ordering Physician Provided Reason for Exam: on vent Acuity: Acute Type of Exam: Initial FINDINGS: AP portable view of the chest time stamped at 613 hours demonstrates overlying cardiac monitoring electrodes. A right internal jugular catheter terminates in the superior vena cava. Endotracheal tube is somewhat high riding terminating 6.4 cm above the ron. Intestinal tube extends beyond the body of the stomach, tip not included on the exam.  Bipolar pacemaker enters from the left with intact leads in appropriate positions. Heart size is normal.  Left pleural effusion is noted there is continued volume loss in the left hemithorax with stable mild vascular congestion, perihilar opacities, and faint opacity in the right mid and lower lung field. Underlying COPD is noted. Osseous structures are stable. There is little change from prior study. Findings of COPD, mild central vascular congestion, left effusion, and scattered opacities favoring interstitial edema with multifocal pneumonitis not excluded. Tubes and lines as above. However, endotracheal tube is high riding. The findings were sent to the Radiology Results [address] at 7:58 am on 4/17/2019to be communicated to a licensed caregiver. RECOMMENDATION: Suggest advancement of endotracheal tube.      Xr Chest Portable    Result Date: 4/16/2019  EXAMINATION: SINGLE XRAY VIEW OF THE CHEST 4/16/2019 6:54 am COMPARISON: 04/15/2019, 610 hours HISTORY: ORDERING SYSTEM PROVIDED HISTORY: ETT placement TECHNOLOGIST PROVIDED HISTORY: ETT placement Ordering Physician Provided Reason for Exam: on vent Acuity: Acute Type of Exam: Initial 67-year-old female on ventilator; check endotracheal tube placement FINDINGS: Portable AP upright view of the chest. Endotracheal tube distal tip overlying the mid trachea approximately 4.1 cm above the level of the ron. Enteric tube traverses the GE junction with distal tip excluded from the field of view. Left subclavian approach cardiac pacemaker device distal lead tips relatively stable in position. Right internal jugular approach central venous catheter distal tip overlying the high right atrium, stable. Cardiac monitor leads overlie the chest. Atherosclerotic calcification of the thoracic aorta. Slight stable volume loss of the left hemithorax. No pneumothorax. No free air. Dense retrocardiac/left basilar airspace consolidation and small left-sided pleural effusion. Stable mild focal opacity at the right mid lung zone. Underlying COPD. Stable mild pulmonary vascular congestion and left-sided predominant parahilar opacity. Visualized osseous structures remain unchanged. 1. Stable multifocal airspace disease as detailed above with dense retrocardiac/left basilar airspace consolidation and small left-sided pleural effusion. Mild pulmonary vascular congestion. Findings may represent edema or multifocal pneumonia. 2. Underlying COPD. 3. Tubes and line as detailed above.      Xr Chest Portable    Result Date: 4/15/2019  EXAMINATION: SINGLE XRAY VIEW OF THE CHEST 4/15/2019 6:47 am COMPARISON: 14 April 2019 HISTORY: ORDERING SYSTEM PROVIDED HISTORY: ETT placement TECHNOLOGIST PROVIDED HISTORY: ETT placement Ordering Physician Provided Reason for Exam: on vent Acuity: Acute Type of Exam: Initial FINDINGS: AP portable view of the chest time stamped at 612 hours demonstrates overlying cardiac monitoring electrodes. Endotracheal tube terminates 4 cm above the ron. Bipolar pacemaker enters from the left with intact leads in appropriate positions. Intestinal tube extends beyond the fundus of the stomach, tip not included. Right internal jugular catheter terminates at the cavoatrial junction. Heart size is normal.  Aortic arch is calcified. There is interval improvement in vascular congestion with resolution of perihilar opacities. Some bibasilar opacities remain. No extrapleural air is noted. Osseous structures are stable. Interval improvement in vascular congestion and bilateral opacities consistent with resolving pulmonary edema. Tubes and lines as above. Xr Chest Portable    Result Date: 4/14/2019  EXAMINATION: SINGLE XRAY VIEW OF THE CHEST 4/14/2019 7:57 am COMPARISON: Portable chest 04/13/2019. HISTORY: ORDERING SYSTEM PROVIDED HISTORY: Intubation TECHNOLOGIST PROVIDED HISTORY: Intubation Ordering Physician Provided Reason for Exam: intubation Acuity: Acute Type of Exam: Initial Additional signs and symptoms: intubation FINDINGS: Endotracheal tube terminates over the midthoracic trachea. Dual-chamber pacemaker leads appear unchanged in position. Right IJ approach central venous catheter unchanged in position. Heart size not substantially changed. Perihilar and basilar opacities further increased. Left pleural effusion increased in size. Findings may reflect pulmonary edema, progressed from yesterday's exam.  Left pleural effusion increased in size.      Vl Upper Extremity Bilateral Venous Duplex    Result Date: 4/22/2019    [facility]  Vascular Upper Extremities Veins Procedure   Patient Name   Sunitha Dwyer     Date of Study           04/22/2019                 Kristi [place]   Date of Birth  1950  Gender                  Female   Age            77 year(s)  Race    Room Number    2002        Height:                 59.84 inch, 152 cm   Corporate ID # [id number]    Weight:                 102 pounds, 46.3 kg   Patient Acct # [de-identified]   BSA:        1.4 m^2     BMI:       20.03 kg/m^2   MR #           [id number]      Sonographer             Roderick Mario   Accession #    [id number]   Interpreting Physician  Kala Holley   Referring                  Referring Physician     Alexa Brittle *  Nurse  Practitioner  Procedure Type of Study:   Veins: Upper Extremities Veins, Venous Scan Upper Bilateral.  Indications for Study:Swelling. Patient Status: In Patient. Technical Quality:Limited visualization. Limitation reason:Line placements. Conclusions   Summary   No evidence of superficial or deep venous thrombosis in both upper  extremities. Signature   ----------------------------------------------------------------  Electronically signed by Roderick Mario(Sonographer) on  04/22/2019 11:46 AM  ----------------------------------------------------------------   ----------------------------------------------------------------  Electronically signed by Ban Oliveira(Interpreting  physician) on 04/22/2019 09:31 PM  ----------------------------------------------------------------  Findings:   Right Impression:                  Left Impression:  Internal jugular vein not          The internal jugular, subclavian,  visualized due to line placement. axillary, brachial, radial, and ulnar                                     veins demonstrate normal  Subclavian, axillary, brachial,    compressibility with phasic Doppler  radial, and ulnar veins            signals. demonstrate normal compressibility  with phasic Doppler signals. Normal compressibility of the cephalic                                     vein. Normal compressibility of the  cephalic vein. Normal compressibility of the basilic                                     vein.   Normal compressibility of the  basilic vein. Velocities are measured in cm/s ; Diameters are measured in cm Right UE Vein Measurements 2D Measurements +------------------------------------+----------+---------------+----------+ ! Location                            ! Visualized! Compressibility! Thrombosis! +------------------------------------+----------+---------------+----------+ ! Prox SCV                            ! Yes       ! Yes            ! None      ! +------------------------------------+----------+---------------+----------+ ! Dist SCV                            ! Yes       ! Yes            ! None      ! +------------------------------------+----------+---------------+----------+ ! Prox Axillary                       ! Yes       ! Yes            ! None      ! +------------------------------------+----------+---------------+----------+ ! Dist Axillary                       ! Yes       ! Yes            ! None      ! +------------------------------------+----------+---------------+----------+ ! Prox Brachial                       !Yes       ! Yes            ! None      ! +------------------------------------+----------+---------------+----------+ ! Dist Brachial                       !Yes       ! Yes            ! None      ! +------------------------------------+----------+---------------+----------+ ! Prox Radial                         !Yes       ! Yes            ! None      ! +------------------------------------+----------+---------------+----------+ ! Dist Radial                         !Yes       ! Yes            ! None      ! +------------------------------------+----------+---------------+----------+ ! Prox Ulnar                          ! Yes       ! Yes            ! None      ! +------------------------------------+----------+---------------+----------+ ! Dist Ulnar                          ! Yes       ! Yes            ! None      ! +------------------------------------+----------+---------------+----------+ ! Basilic at                        ! Yes       ! Yes +------------------------------------+----------+---------------+----------+ ! Dist IJV                            ! Yes       ! Yes            ! None      ! +------------------------------------+----------+---------------+----------+ ! Prox SCV                            ! Yes       ! Yes            ! None      ! +------------------------------------+----------+---------------+----------+ ! Dist SCV                            ! Yes       ! Yes            ! None      ! +------------------------------------+----------+---------------+----------+ ! Prox Axillary                       ! Yes       ! Yes            ! None      ! +------------------------------------+----------+---------------+----------+ ! Dist Axillary                       ! Yes       ! Yes            ! None      ! +------------------------------------+----------+---------------+----------+ ! Prox Brachial                       !Yes       ! Yes            ! None      ! +------------------------------------+----------+---------------+----------+ ! Dist Brachial                       !Yes       ! Yes            ! None      ! +------------------------------------+----------+---------------+----------+ ! Prox Radial                         !Yes       ! Yes            ! None      ! +------------------------------------+----------+---------------+----------+ ! Dist Radial                         !Yes       ! Yes            ! None      ! +------------------------------------+----------+---------------+----------+ ! Prox Ulnar                          ! Yes       ! Yes            ! None      ! +------------------------------------+----------+---------------+----------+ ! Dist Ulnar                          ! Yes       ! Yes            ! None      ! +------------------------------------+----------+---------------+----------+ ! Basilic at                        ! Yes       ! Yes            ! None      ! +------------------------------------+----------+---------------+----------+ ! Cephalic at UA !Yes       !Yes            ! None      ! +------------------------------------+----------+---------------+----------+ ! Cephalic at AF                      ! Yes       ! Yes            ! None      ! +------------------------------------+----------+---------------+----------+ Doppler Measurements +-------------------------+-----------------------+------------------------+ ! Location                 ! Signal                 !Reflux                  ! +-------------------------+-----------------------+------------------------+ ! IJV                      ! Phasic                 !                        ! +-------------------------+-----------------------+------------------------+ ! SCV                      ! Phasic                 !                        ! +-------------------------+-----------------------+------------------------+ ! Axillary                 ! Phasic                 !                        ! +-------------------------+-----------------------+------------------------+ ! Brachial                 !Phasic                 !                        ! +-------------------------+-----------------------+------------------------+    Vl Dup Lower Extremity Venous Bilateral    Result Date: 5/7/2019    [facility]  Vascular Lower Extremities DVT Study Procedure   Patient Name   [facility]     Date of Study           05/07/2019                 Theodis Bloch   Date of Birth  1950  Gender                  Female   Age            77 year(s)  Race                       Room Number    2123        Height:                 59.84 inch, 152 cm   Corporate ID # [id number]    Weight:                 102 pounds, 46.3 kg   Patient Acct # [de-identified]   BSA:        1.4 m^2     BMI:      20.03 kg/m^2   MR #           [id number]      Sonographer             Jamel Islas, T   Accession #    [id number]   Interpreting Physician  Haider Smallwood   Referring                  Referring Physician     Jerrica Mendez MD Nurse  Practitioner  Procedure Type of Study:   Veins: Lower Extremities DVT Study, Venous Scan Lower Bilateral.  Indications for Study:Pain and swelling. Patient Status: In Patient. Technical Quality:Limited visualization. Limitation reason:patient movement. Conclusions   Summary   No evidence of superficial or deep venous thrombosis in both lower  extremities. Technically limited visualization. Signature   ----------------------------------------------------------------  Electronically signed by Hector Hanna RVT(Sonographer) on  05/07/2019 01:22 PM  ----------------------------------------------------------------   ----------------------------------------------------------------  Electronically signed by Enma Oliveira(Interpreting  physician) on 05/07/2019 06:45 PM  ----------------------------------------------------------------  Findings:   Right Impression:                    Left Impression:  The common femoral, femoral,         The common femoral, femoral,  popliteal and tibial veins           popliteal and tibial veins  demonstrate normal compressibility   demonstrate normal compressibility  and augmentation. and augmentation. Limited visualization of the calf    Limited visualization of the calf  veins. veins. Normal compressibility of the great  Normal compressibility of the great  saphenous vein. saphenous vein. Normal compressibility of the small  Normal compressibility of the small  saphenous vein. saphenous vein. Velocities are measured in cm/s ; Diameters are measured in cm Right Lower Extremities DVT Study Measurements Right 2D Measurements +------------------------------------+----------+---------------+----------+ ! Location                            ! Visualized! Compressibility! Thrombosis! +------------------------------------+----------+---------------+----------+ ! Common Femoral +------------------------------------+----------+---------------+----------+ ! SSV                                 ! Partial   !Yes            ! None      ! +------------------------------------+----------+---------------+----------+ Left Lower Extremities DVT Study Measurements Left 2D Measurements +------------------------------------+----------+---------------+----------+ ! Location                            ! Visualized! Compressibility! Thrombosis! +------------------------------------+----------+---------------+----------+ ! Common Femoral                      !Yes       ! Yes            ! None      ! +------------------------------------+----------+---------------+----------+ ! Prox Femoral                        !Yes       ! Yes            ! None      ! +------------------------------------+----------+---------------+----------+ ! Mid Femoral                         !Yes       ! Yes            ! None      ! +------------------------------------+----------+---------------+----------+ ! Dist Femoral                        !Yes       ! Yes            ! None      ! +------------------------------------+----------+---------------+----------+ ! Deep Femoral                        !Yes       ! Yes            ! None      ! +------------------------------------+----------+---------------+----------+ ! Popliteal                           !Yes       ! Yes            ! None      ! +------------------------------------+----------+---------------+----------+ ! Sapheno Femoral Junction            ! Yes       ! Yes            ! None      ! +------------------------------------+----------+---------------+----------+ ! PTV                                 ! Partial   !Yes            ! None      ! +------------------------------------+----------+---------------+----------+ ! Peroneal                            !Partial   !Yes            ! None      ! +------------------------------------+----------+---------------+----------+ ! Gastroc !Yes       !Yes            ! None      ! +------------------------------------+----------+---------------+----------+ ! GSV Thigh                           ! Yes       ! Yes            ! None      ! +------------------------------------+----------+---------------+----------+ ! GSV Knee                            ! Yes       ! Yes            ! None      ! +------------------------------------+----------+---------------+----------+ ! GSV Ankle                           ! Yes       ! Yes            ! None      ! +------------------------------------+----------+---------------+----------+ ! SSV                                 ! Partial   !Yes            ! None      ! +------------------------------------+----------+---------------+----------+    Ir Cholecystostomy Percutaneous Complete    Result Date: 4/22/2019  PROCEDURE: ULTRASOUND and fluoroscopic GUIDED CHOLECYSTOSTOMY TUBE PLACEMENT April 22, 2019 HISTORY: ORDERING SYSTEM PROVIDED HISTORY: acute cholecystitis TECHNOLOGIST PROVIDED HISTORY: acute cholecystitis SEDATION: 75 mcg IV fentanyl for pain TECHNIQUE: Informed consent was obtained after a detailed explanation of the procedure including risks, benefits, and alternatives. Universal protocol was followed. A suitable skin site was prepped and draped in sterile fashion following ultrasound localization. An 18 gauge needle was advanced under ultrasound guidance into the gallbladder via transhepatic route and a 0.035 guidewire was used to place a 8 [place] cholecystostomy tube under fluoroscopic guidance. The catheter was sutured to the skin and the patient tolerated the procedure well. Thick bilious material was sent for laboratory analysis. The catheter was attached to gravity drainage. FINDINGS: Ultrasound image demonstrates distended gallbladder. Bilious fluid was sent for culture. Successful placement of transhepatic 8 [place] percutaneous cholecystostomy tube.      Nm Hepatobiliary Scan W Ejection Fraction    Result bowel within the mid and lower abdomen and pelvis. There appears to be contrast extending to the colon in the right side of the abdomen. Contrast is noted within the pelvis which may be within the bladder or rectum. Significant delay in emptying of the stomach with persistent contrast noted at the 6 hour concha. There is contrast extending into the bowel which appears to be in the colon. Subtle findings are limited. Consider follow-up radiograph of the abdomen in 6-8 hours. Ir Place Ng Tube By Dr Finesse Ellis    Result Date: 5/9/2019  PROCEDURE: XR PLACE NASOGASTRIC TUBE PHYS 5/9/2019 HISTORY: ORDERING SYSTEM PROVIDED HISTORY: ileus TECHNOLOGIST PROVIDED HISTORY: ileus Ordering Physician Provided Reason for Exam: ILEUS Acuity: Unknown Type of Exam: Unknown CONTRAST: None SEDATION: None FLUOROSCOPY DOSE AND TYPE OR TIME AND EXPOSURES: 21 seconds; D AP 46 DESCRIPTION OF PROCEDURE AND FINDINGS: This procedure was performed by Dr. Italia Vyas. Under intermittent fluoroscopic guidance a 12 [place] nasogastric tube was placed through the right nostril and directed under fluoroscopy into the stomach. A small amount of air was injected confirming the tip location in the stomach. Partially visualized cholecystostomy tube and pacer wires. Tube was secured in place to the patient's nose with an adhesive dressing. 12 [place] nasogastric tube placed successfully with its tip in the stomach. Physical Examination:        Physical Exam   Constitutional: She is oriented to person, place, and time. She appears well-developed. She appears distressed (mild). Cachectic    HENT:   Oropharynx dry    Eyes: Conjunctivae are normal.   Neck: Neck supple. Cardiovascular: Regular rhythm and normal heart sounds. Tachycardic    Pulmonary/Chest: Effort normal and breath sounds normal.   Increased resp rate    Abdominal: Soft. Bowel sounds are normal. There is no tenderness. Musculoskeletal: She exhibits no edema or tenderness. Neurological: She is alert and oriented to person, place, and time. Left sided hemiparesis  - chronic      Skin: Skin is warm and dry. Nursing note and vitals reviewed.         Assessment:        Primary Problem  GI bleed    Active Hospital Problems    Diagnosis Date Noted    Small bowel obstruction ([facility] [address] [K56.609]     Anxiety disorder [F41.9]     Noncompliance [Z91.19]     Anemia [D64.9]     GI bleed [K92.2] 05/03/2019    Hemorrhage of rectum and anus [K62.5]     Centrilobular emphysema ([address] [J43.2] 04/30/2019    CRP elevated [R79.82]     Elevated procalcitonin [R79.89]     Bandemia [D72.825]     Cholecystitis [K81.9]     Abdominal distention [R14.0]     Elevated CEA [R97.0]     Elevated CA 19-9 level [R97.8]     Urinary retention [R33.9]     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]     Septic shock ([address] [A41.9, R65.21]     Leukemoid reaction [D72.823]     Aspiration pneumonia of right lower lobe due to vomit ([address] [J69.0]     MRSA carrier [Z22.322]     Sepsis due to urinary tract infection ([address] [A41.9, N39.0] 04/11/2019    Cystitis [N30.90] 04/11/2019       Plan:        Acute Respiratory Failure r/o sepsis 2/2 aspiration pneumonia  - No intubation per code status  - Pulm consulted - appreciate rec   -fluid at 100cc/hr   -dc precedex    -haldol q6  - ID consulted - appreciate recs               Meropenam + Vanc started 5/10   - On BIPAP and Levophed ggt  - WBC 14.9  - Con. Dulera, Xopenex, Symbicort, Atrovent  - Blood culture NGTD   -CXR improving   -f/u UA     Abdominal pain, Ileus vs SBO  -NGT 5/9  - Gen surg consulted - appreciate recs               Suggesting laparotomy               Pt agreeable to surgery , consent signed   - SBFT significantly delayed emptying after 6h     Hypernatremia  - resolved   Na: 140   Lasix dced      Anemia, likely due to GI Bleed  - Patient refusing intervention EGC/ colonoscopy   - Hgb 9 s/p 6u PRBC total  - Transfuse Hgb <7.0  - Continue to monitor   - GI consulted - appreciate recs      Psych consult  - Patient refusing all medical treatment   - MMSE score 22     Initial sepsis likely secondary to e.coli, emphysematous cystitis - resolved   - Urology consulted - appreciate recs - dyer dced (4/30)  - Cholecystotomy tube 4/22      Sinus Tachycardia  - Hx of CBA, bradycardia with pacemaker   - Lopressor   - Lopressor PRN   - Monitoring      Acute cholecystitis   - General surgery consulted - appreciate recs   - Surgical intervention: cholecystomy tube 4/22    - Cholecystectomy in patient when medically stable      Gastroparesis   - Protonix  - Reglan  - Daily EKG  - Considering systemic autonomic dysfunction as overlying diagnosis (gastroparesis, - neurogenic bladder, hypotension/fluctuating BPs)      Maintenance  - Heparin 5000u q12h   - TPN per dietary, Phos 1.4  - Continue home meds     Dispo  - PT/OT eval and treat   - Social work [employer] planning      Patient signed consent for surgery. Discussed in length risks of no medical management. Patient voiced understanding. Trying for appeal of LTAC, social work on  MD Girish  5/12/2019  7:13 AM     Attending Physician Statement  Patient seen and examined  I have discussed the care of the patient, including pertinent history and exam findings,  with the resident. I have reviewed the key elements of all parts of the encounter with the resident. I agree with the assessment, plan and orders as documented by the resident.     Lawrence Kapoor

## 2019-05-12 NOTE — PROGRESS NOTES
Nutrition Assessment (Parenteral Nutrition)    Type and Reason for Visit: Reassess    Nutrition Recommendations: Continue lipids and TPN with the following adjustments to additives: NaCl: 60 to 70 mEq, K Acetate: 0 to 20 mEq, K phosphate: 20 to 30 mMol. Nutrition Assessment: Pt relatively stable from a nutritional viewpoint with some fluctuation in lab values and dependence on TPN and lipids for nutritional support. Bloody stools noted and pt has refused surgical intervention. Poor prognosis noted. TPN to be adjusted. Will continue to monitor labs, nutrition progression, and further plan of care. Malnutrition Assessment:  · Malnutrition Status: Insufficient data  · Context: Acute illness or injury  · Findings of the 6 clinical characteristics of malnutrition (Minimum of 2 out of 6 clinical characteristics is required to make the diagnosis of moderate or severe Protein Calorie Malnutrition based on AND/ASPEN Guidelines):  1. Energy Intake-Less than or equal to 75% of estimated energy requirement(Previously; improving with parenteral nutrition), Greater than or equal to 5 days    2. Weight Loss-Unable to assess(edema present),    3. Fat Loss-Unable to assess,    4. Muscle Loss-Unable to assess,    5. Fluid Accumulation-Mild fluid accumulation(Mild to moderate), Extremities  6.  Strength-Not measured    Nutrition Risk Level: High    Nutrient Needs:  · Estimated Daily Total Kcal: 6127-5263 based on 35-39 kcal per kg of usual body wt  · Estimated Daily Protein (g): 63-68 based on 1.4-1.5 gm/kg IBW(revised)    Nutrition Diagnosis:   · Problem: Inadequate oral intake  · Etiology: related to Alteration in GI function, Insufficient energy/nutrient consumption     Signs and symptoms:  as evidenced by NPO status due to medical condition, Nutrition support - PN, GI abnormality    Objective Information:  · Nutrition-Focused Physical Findings: Abdominal distention. Hypoactive bowel sounds. Bloody stools.  Edema:

## 2019-05-12 NOTE — PROGRESS NOTES
Hospice consult called to [facility]. Admissions staff will be here at 1300 on 5/12 for meeting with patient.

## 2019-05-12 NOTE — PROGRESS NOTES
ICU Progress Note (Non-Vent)  Pulmonary and Critical Care Specialists    Patient - Keshia Joseph,  Age - 77 y.o.    - 1950      Room Number -    MRN -  [id number]   [facility]  Date of Admission -  2019  2:48 PM    Events of Past 24 Hours   She became more alert and subsequently refused any surgical intervention  Overnight, became very agitated  Still on norepinephrine drip    Vitals    height is 5' (1.524 m) and weight is 89 lb 8 oz (40.6 kg). Her axillary temperature is 97.8 °F (36.6 °C). Her blood pressure is 101/50 (abnormal) and her pulse is 91. Her respiration is 19 and oxygen saturation is 99%.        Temperature Range: Temp: 97.8 °F (36.6 °C) Temp  Av.5 °F (36.4 °C)  Min: 96.9 °F (36.1 °C)  Max: 97.8 °F (36.6 °C)  BP Range:  Systolic (29JFY), CHH:185 , Min:54 , NZM:673     Diastolic (55RUR), GBY:10, Min:19, Max:91    Pulse Range: Pulse  Av.3  Min: 58  Max: 133  Respiration Range: Resp  Av  Min: 13  Max: 30  Current Pulse Ox[[id number]]  SpO2: 99 %  24HR Pulse Ox Range:  SpO2  Av.8 %  Min: 96 %  Max: 100 %  Oxygen Amount and Delivery: O2 Flow Rate (L/min): 0 L/min    Wt Readings from Last 3 Encounters:   19 89 lb 8 oz (40.6 kg)   19 89 lb (40.4 kg)   19 94 lb 1.6 oz (42.7 kg)     I/O       Intake/Output Summary (Last 24 hours) at 2019 0735  Last data filed at 2019 0545  Gross per 24 hour   Intake 3417.38 ml   Output 550 ml   Net 2867.38 ml     DRAIN/TUBE OUTPUT       Invasive Lines   ICP PRESSURE RANGE  No data recorded  CVP PRESSURE RANGE  No data recorded      Medications      metoprolol  5 mg Intravenous Q12H    pantoprazole  40 mg Intravenous Daily    And    sodium chloride (PF)  10 mL Intravenous Daily    alteplase  1 mg Intracatheter Once    levalbuterol  1.25 mg Nebulization Q4H    ipratropium  0.5 mg Nebulization Q4H    heparin (porcine)  5,000 Units cyanosis, clubbing or edema    Lab Results   CBC     Lab Results   Component Value Date    WBC 14.9 05/12/2019    RBC 3.28 05/12/2019    RBC 3.66 05/23/2012    HGB 9.0 05/12/2019    HCT 28.5 05/12/2019     05/12/2019     05/23/2012    MCV 86.9 05/12/2019    MCH 27.5 05/12/2019    MCHC 31.6 05/12/2019    RDW 17.1 05/12/2019    METASPCT 1 05/11/2019    LYMPHOPCT 10 05/12/2019    MONOPCT 4 05/12/2019    MYELOPCT 1 05/07/2019    BASOPCT 0 05/12/2019    MONOSABS 0.60 05/12/2019    LYMPHSABS 1.49 05/12/2019    EOSABS 0.00 05/12/2019    BASOSABS 0.00 05/12/2019    DIFFTYPE NOT REPORTED 05/12/2019       BMP   Lab Results   Component Value Date     05/12/2019    K 3.3 05/12/2019     05/12/2019    CO2 23 05/12/2019    BUN 33 05/12/2019    CREATININE <0.40 05/12/2019    GLUCOSE 113 05/12/2019    GLUCOSE 87 05/21/2012       LFTS  Lab Results   Component Value Date    ALKPHOS 196 05/11/2019    ALT 18 05/11/2019    AST 19 05/11/2019    PROT 5.9 05/11/2019    BILITOT 0.38 05/11/2019    BILIDIR 0.21 05/11/2019    IBILI 0.17 05/11/2019    LABALBU 1.9 05/11/2019    LABALBU 4.6 05/17/2012       ABG ABGs:   Lab Results   Component Value Date    PHART 7.370 05/10/2019    PO2ART 68.2 05/10/2019    HFC0HWY 37.1 05/10/2019       Lab Results   Component Value Date    MODE NOT REPORTED 05/10/2019         INR  Recent Labs     05/10/19  0657 05/11/19  0400 05/12/19  0436   PROTIME 16.8* 17.7* 16.6*   INR 1.4 1.5 1.3       APTT  No results for input(s): APTT in the last 72 hours. Lactic Acid  Lab Results   Component Value Date    LACTA 0.9 05/11/2019    LACTA 1.5 05/10/2019    LACTA 2.2 05/10/2019        BNP   No results for input(s): BNP in the last 72 hours.      Cultures       Radiology     Right-sided PICC line placed April 30      SYSTEMS ASSESSMENT    Acute hypoxic respiratory failure; intubated 4/17 and extubated 4/19  Severe protein calorie malnutrition  Possible recurrent aspiration pneumonia  COPD severe clinically  Hypernatremia  Hypotension on norepinephrine, but improved compared to yesterday        Neuro   Currently alert and answers questions  Extremely poor insight into disease and process  Use Precedex for agitation; I've ordered Haldol in case agitation becomes so severe    Respiratory   Wean oxygen as tolerated.  Keep O2 sat > 88%  Continue DuoNeb aerosols every 4 hours  BIPAP support as needed  Follow-up chest x-ray tomorrow  Cardiovascular   Remains on norepinephrine for blood pressure support  Currently getting over 175 mL fluid in our     Gastrointestinal   Need more potassium in TPN  Consider trickled down tube feeds again  Limited options and she is refusing surgery    Renal   Replace potassium  Hypernatremia has been corrected, can go back to putting regular amounts of sodium in TPN  Keep total fluids at 100 mL per hour      Infectious Disease   On Merrem and vancomycin or infectious disease   Felt that her episode where she required transfer to ICU was due in part to aspiration (I do agree)    Hematology/Oncology   On DVT prophylaxis with subcu heparin  Monitor hemoglobin    Endocrine   Her blood sugars okay    Social/Spiritual/DNR/Disposition/Other   Continue ICU monitoring  Would move now toward [address] Time   35 min    Electronically signed by Janie Horn MD on 5/12/2019 at 7:27 AM

## 2019-05-12 NOTE — FLOWSHEET NOTE
05/12/19 0426   Provider Notification   Reason for Communication Evaluate; Review case   Provider Name Dr Leah Zuniga   Provider Notification Physician   Method of Communication Face to face   Response At bedside   Notification Time 0220       Provider rounding at bedside. Provider would like total fluids to be at 100 ml/hr. Potassium of 3.3 addressed, 40 mEq IV potassium ordered. Continue to titrate levophed as needed. All fluid orders to be addressed thru critical care.

## 2019-05-12 NOTE — PROGRESS NOTES
Writer spoke with Dr Miguel Rowe. Patient returning to ICU with PACU team. Patient refusing surgery after being taking the patient to the OR.

## 2019-05-13 ENCOUNTER — APPOINTMENT (OUTPATIENT)
Dept: GENERAL RADIOLOGY | Age: 69
DRG: 853 | End: 2019-05-13
Payer: COMMERCIAL

## 2019-05-13 LAB
ABSOLUTE BANDS #: 2.57 K/UL (ref 0–1)
ABSOLUTE EOS #: 0 K/UL (ref 0–0.4)
ABSOLUTE IMMATURE GRANULOCYTE: ABNORMAL K/UL (ref 0–0.3)
ABSOLUTE LYMPH #: 1.36 K/UL (ref 1–4.8)
ABSOLUTE MONO #: 0.91 K/UL (ref 0.1–1.3)
ANION GAP SERPL CALCULATED.3IONS-SCNC: 12 MMOL/L (ref 9–17)
BANDS: 17 % (ref 0–10)
BASOPHILS # BLD: 0 % (ref 0–2)
BASOPHILS ABSOLUTE: 0 K/UL (ref 0–0.2)
BUN BLDV-MCNC: 25 MG/DL (ref 8–23)
BUN/CREAT BLD: ABNORMAL (ref 9–20)
CALCIUM SERPL-MCNC: 7.4 MG/DL (ref 8.6–10.4)
CHLORIDE BLD-SCNC: 106 MMOL/L (ref 98–107)
CO2: 20 MMOL/L (ref 20–31)
CREAT SERPL-MCNC: <0.4 MG/DL (ref 0.5–0.9)
DIFFERENTIAL TYPE: ABNORMAL
EOSINOPHILS RELATIVE PERCENT: 0 % (ref 0–4)
GFR AFRICAN AMERICAN: ABNORMAL ML/MIN
GFR NON-AFRICAN AMERICAN: ABNORMAL ML/MIN
GFR SERPL CREATININE-BSD FRML MDRD: ABNORMAL ML/MIN/{1.73_M2}
GFR SERPL CREATININE-BSD FRML MDRD: ABNORMAL ML/MIN/{1.73_M2}
GLUCOSE BLD-MCNC: 105 MG/DL (ref 65–105)
GLUCOSE BLD-MCNC: 87 MG/DL (ref 65–105)
GLUCOSE BLD-MCNC: 95 MG/DL (ref 70–99)
HCT VFR BLD CALC: 29.1 % (ref 36–46)
HEMOGLOBIN: 9.3 G/DL (ref 12–16)
IMMATURE GRANULOCYTES: ABNORMAL %
INR BLD: 1.3
LYMPHOCYTES # BLD: 9 % (ref 24–44)
MAGNESIUM: 1.9 MG/DL (ref 1.6–2.6)
MCH RBC QN AUTO: 27.8 PG (ref 26–34)
MCHC RBC AUTO-ENTMCNC: 31.9 G/DL (ref 31–37)
MCV RBC AUTO: 87.4 FL (ref 80–100)
MONOCYTES # BLD: 6 % (ref 1–7)
MORPHOLOGY: ABNORMAL
NRBC AUTOMATED: ABNORMAL PER 100 WBC
PDW BLD-RTO: 16.1 % (ref 11.5–14.9)
PHOSPHORUS: 2.6 MG/DL (ref 2.6–4.5)
PLATELET # BLD: 224 K/UL (ref 150–450)
PLATELET ESTIMATE: ABNORMAL
PMV BLD AUTO: 8.4 FL (ref 6–12)
POTASSIUM SERPL-SCNC: 3.6 MMOL/L (ref 3.7–5.3)
PROTHROMBIN TIME: 15.8 SEC (ref 11.8–14.6)
RBC # BLD: 3.33 M/UL (ref 4–5.2)
RBC # BLD: ABNORMAL 10*6/UL
SEG NEUTROPHILS: 68 % (ref 36–66)
SEGMENTED NEUTROPHILS ABSOLUTE COUNT: 10.26 K/UL (ref 1.3–9.1)
SODIUM BLD-SCNC: 138 MMOL/L (ref 135–144)
WBC # BLD: 15.1 K/UL (ref 3.5–11)
WBC # BLD: ABNORMAL 10*3/UL

## 2019-05-13 PROCEDURE — 2500000003 HC RX 250 WO HCPCS: Performed by: STUDENT IN AN ORGANIZED HEALTH CARE EDUCATION/TRAINING PROGRAM

## 2019-05-13 PROCEDURE — 94761 N-INVAS EAR/PLS OXIMETRY MLT: CPT

## 2019-05-13 PROCEDURE — 2580000003 HC RX 258: Performed by: STUDENT IN AN ORGANIZED HEALTH CARE EDUCATION/TRAINING PROGRAM

## 2019-05-13 PROCEDURE — 1200000000 HC SEMI PRIVATE

## 2019-05-13 PROCEDURE — 83735 ASSAY OF MAGNESIUM: CPT

## 2019-05-13 PROCEDURE — 2500000003 HC RX 250 WO HCPCS: Performed by: INTERNAL MEDICINE

## 2019-05-13 PROCEDURE — 82947 ASSAY GLUCOSE BLOOD QUANT: CPT

## 2019-05-13 PROCEDURE — C9113 INJ PANTOPRAZOLE SODIUM, VIA: HCPCS | Performed by: STUDENT IN AN ORGANIZED HEALTH CARE EDUCATION/TRAINING PROGRAM

## 2019-05-13 PROCEDURE — 6370000000 HC RX 637 (ALT 250 FOR IP): Performed by: INTERNAL MEDICINE

## 2019-05-13 PROCEDURE — 36415 COLL VENOUS BLD VENIPUNCTURE: CPT

## 2019-05-13 PROCEDURE — 6360000002 HC RX W HCPCS: Performed by: INTERNAL MEDICINE

## 2019-05-13 PROCEDURE — 84100 ASSAY OF PHOSPHORUS: CPT

## 2019-05-13 PROCEDURE — 85025 COMPLETE CBC W/AUTO DIFF WBC: CPT

## 2019-05-13 PROCEDURE — 2580000003 HC RX 258: Performed by: INTERNAL MEDICINE

## 2019-05-13 PROCEDURE — 85610 PROTHROMBIN TIME: CPT

## 2019-05-13 PROCEDURE — 94640 AIRWAY INHALATION TREATMENT: CPT

## 2019-05-13 PROCEDURE — 2700000000 HC OXYGEN THERAPY PER DAY

## 2019-05-13 PROCEDURE — 99232 SBSQ HOSP IP/OBS MODERATE 35: CPT | Performed by: INTERNAL MEDICINE

## 2019-05-13 PROCEDURE — 80048 BASIC METABOLIC PNL TOTAL CA: CPT

## 2019-05-13 PROCEDURE — 6360000002 HC RX W HCPCS: Performed by: STUDENT IN AN ORGANIZED HEALTH CARE EDUCATION/TRAINING PROGRAM

## 2019-05-13 PROCEDURE — 71045 X-RAY EXAM CHEST 1 VIEW: CPT

## 2019-05-13 RX ORDER — DEXTROSE MONOHYDRATE 50 MG/ML
100 INJECTION, SOLUTION INTRAVENOUS PRN
Status: DISCONTINUED | OUTPATIENT
Start: 2019-05-13 | End: 2019-05-13 | Stop reason: SDUPTHER

## 2019-05-13 RX ORDER — NICOTINE POLACRILEX 4 MG
15 LOZENGE BUCCAL PRN
Status: DISCONTINUED | OUTPATIENT
Start: 2019-05-13 | End: 2019-05-13 | Stop reason: SDUPTHER

## 2019-05-13 RX ORDER — DEXTROSE MONOHYDRATE 25 G/50ML
12.5 INJECTION, SOLUTION INTRAVENOUS PRN
Status: DISCONTINUED | OUTPATIENT
Start: 2019-05-13 | End: 2019-05-13 | Stop reason: SDUPTHER

## 2019-05-13 RX ADMIN — ASPIRIN 81 MG: 81 TABLET, COATED ORAL at 09:09

## 2019-05-13 RX ADMIN — LEVALBUTEROL 1.25 MG: 1.25 SOLUTION, CONCENTRATE RESPIRATORY (INHALATION) at 16:10

## 2019-05-13 RX ADMIN — METOPROLOL TARTRATE 5 MG: 5 INJECTION, SOLUTION INTRAVENOUS at 09:06

## 2019-05-13 RX ADMIN — VENLAFAXINE 37.5 MG: 37.5 TABLET ORAL at 09:10

## 2019-05-13 RX ADMIN — FENTANYL CITRATE 50 MCG: 50 INJECTION INTRAMUSCULAR; INTRAVENOUS at 09:07

## 2019-05-13 RX ADMIN — Medication 2 PUFF: at 21:19

## 2019-05-13 RX ADMIN — IPRATROPIUM BROMIDE 0.5 MG: 0.5 SOLUTION RESPIRATORY (INHALATION) at 11:04

## 2019-05-13 RX ADMIN — Medication 10 ML: at 09:09

## 2019-05-13 RX ADMIN — MEROPENEM 1 G: 1 INJECTION, POWDER, FOR SOLUTION INTRAVENOUS at 02:10

## 2019-05-13 RX ADMIN — MORPHINE SULFATE 2 MG: 2 INJECTION, SOLUTION INTRAMUSCULAR; INTRAVENOUS at 05:17

## 2019-05-13 RX ADMIN — LEVALBUTEROL 1.25 MG: 1.25 SOLUTION, CONCENTRATE RESPIRATORY (INHALATION) at 19:27

## 2019-05-13 RX ADMIN — POTASSIUM CHLORIDE, DEXTROSE MONOHYDRATE AND SODIUM CHLORIDE: 150; 5; 450 INJECTION, SOLUTION INTRAVENOUS at 04:52

## 2019-05-13 RX ADMIN — LEVALBUTEROL 1.25 MG: 1.25 SOLUTION, CONCENTRATE RESPIRATORY (INHALATION) at 11:04

## 2019-05-13 RX ADMIN — MEROPENEM 1 G: 1 INJECTION, POWDER, FOR SOLUTION INTRAVENOUS at 16:53

## 2019-05-13 RX ADMIN — VENLAFAXINE 37.5 MG: 37.5 TABLET ORAL at 21:19

## 2019-05-13 RX ADMIN — MEROPENEM 1 G: 1 INJECTION, POWDER, FOR SOLUTION INTRAVENOUS at 09:14

## 2019-05-13 RX ADMIN — Medication 10 ML: at 21:17

## 2019-05-13 RX ADMIN — IPRATROPIUM BROMIDE 0.5 MG: 0.5 SOLUTION RESPIRATORY (INHALATION) at 19:27

## 2019-05-13 RX ADMIN — PANTOPRAZOLE SODIUM 40 MG: 40 INJECTION, POWDER, FOR SOLUTION INTRAVENOUS at 09:08

## 2019-05-13 RX ADMIN — Medication 10 ML: at 09:22

## 2019-05-13 RX ADMIN — Medication 2 PUFF: at 09:14

## 2019-05-13 RX ADMIN — IPRATROPIUM BROMIDE 0.5 MG: 0.5 SOLUTION RESPIRATORY (INHALATION) at 16:10

## 2019-05-13 RX ADMIN — FENTANYL CITRATE 50 MCG: 50 INJECTION INTRAMUSCULAR; INTRAVENOUS at 02:10

## 2019-05-13 ASSESSMENT — PAIN SCALES - GENERAL
PAINLEVEL_OUTOF10: 1
PAINLEVEL_OUTOF10: 6
PAINLEVEL_OUTOF10: 7
PAINLEVEL_OUTOF10: 9
PAINLEVEL_OUTOF10: 7
PAINLEVEL_OUTOF10: 6

## 2019-05-13 ASSESSMENT — ENCOUNTER SYMPTOMS
VOMITING: 0
DIARRHEA: 1
CONSTIPATION: 0
SHORTNESS OF BREATH: 0
NAUSEA: 0
ABDOMINAL PAIN: 0

## 2019-05-13 ASSESSMENT — PAIN DESCRIPTION - PAIN TYPE: TYPE: ACUTE PAIN

## 2019-05-13 ASSESSMENT — PAIN DESCRIPTION - LOCATION: LOCATION: ABDOMEN

## 2019-05-13 ASSESSMENT — PAIN DESCRIPTION - DESCRIPTORS: DESCRIPTORS: ACHING

## 2019-05-13 ASSESSMENT — PAIN DESCRIPTION - ORIENTATION: ORIENTATION: RIGHT;LEFT

## 2019-05-13 NOTE — PROGRESS NOTES
Pt. Reports to Dr. Avery Her that she wants her gall bladder out tomorrow  Dr. Samuel Drivers notified that the pt.  Wants her gall bladder out tomorrow

## 2019-05-13 NOTE — PROGRESS NOTES
Infectious disease Consult Note      Patient: Ned Noriega  : 1950  Acct#:  [id number]     Date:  2019    Assessment:   Leukocytosis likely related to aspiration pneumonia . Shock. Anemia/GI bleed followed by GI  Ileus ,possible SBO   Cholecystitis status post cholecystectomy tube  grew Candida non-albicans and one colony of ESBL Klebsiella on culture . finished ABXS course      Ecoli Emphysematous cystitis initially treated     Aspiration pneumonia of right lower lobe initially treated    Yeast growth on sputum culture suspect contamination     MRSA carrier    Urinary retention     Anemia      PCN allergy               Recommendations:     Continue IV meropenem d/c vancomycin. Follow Blood cultures  Continue comfort care   Follow CBC . Subjective:       History of Present Illness  Patient is a 77 y.o.  female admitted with Sepsis due to urinary tract infection   who is seen in consult for the same . She presented w dysuria and lower abd pain for around a week associated with vomiting ,was found hypotensive with leukocytosis . CT suggested emphysematous cystitis,possible gastric outlet obstruction. CXR showed a right lower infiltrate. High CA-19-9,CEA  Allergy to [facility] w SOB   Urine culture  grew ESCHERICHIA COLI resistant to Ampicillin ,sensitive to all other tested ABXS . Blood cultures negative . Mycoplasma IGM 0.97   YEAST MODERATE GROWTH on sputum culture   S/P EGD   with reported esophagitis. GB US Findings suggesting acute cholecystitis. HIIDA showed absent gallbladder filling. She was extubated on   Status post cholecystectomy tube  by interventional radiology,  cultures on  grew Candida non-albicans and one colony of ESBL Klebsiella. PICC line was placed   Tagged RBC scan  was negative . CT abdomen  showed no fluid collection ,Bowel obstruction which is suspected to be caused by an internal hernia.   NG tube was placed under fluoroscopy 5/9. She had a small bowel follow-through 5/9, developed respiratory failure with hypoxia, tachycardia and tachypnea was transferred to ICU placed on BiPAP, blood pressure on the low side required Levophed,WBC up to 28.5  . Interval history :  She still complaining of abdominal pain ,no active bleed ,no fever or chills ,code status was changed to [facility]       Past Medical History:   Diagnosis Date    Abnormal computed tomography of cervical spine     sclerotic bone appearance     CVA (cerebral vascular accident) ([address]     left  side weakness    GERD (gastroesophageal reflux disease)     Hypertension     Paraproteinemia     Weight loss       Past Surgical History:   Procedure Laterality Date    INTUBATION  4/14/2019         PACEMAKER PLACEMENT  07/2011    Pacemaker is Medtronic Revo (compatible). Leads placed in 1995 are NOT MRI compatible. Placed at [facility] [address]. V's per Dr. Zuleima Reeves can not have an MRI.  UPPER GASTROINTESTINAL ENDOSCOPY N/A 4/16/2019    EGD ESOPHAGOGASTRODUODENOSCOPY @ BEDSIDE  ICU 2002 performed by Vinay Kenney MD at [address]          Admission Meds  No current facility-administered medications on file prior to encounter. Current Outpatient Medications on File Prior to Encounter   Medication Sig Dispense Refill    oxyCODONE-acetaminophen (PERCOCET) 5-325 MG per tablet Take 1 tablet by mouth every 6 hours as needed for Pain.       senna-docusate (PERICOLACE) 8.6-50 MG per tablet Take 1 tablet by mouth 2 times daily      budesonide-formoterol (SYMBICORT) 160-4.5 MCG/ACT AERO Inhale 2 puffs into the lungs 2 times daily      sucralfate (CARAFATE) 1 GM/10ML suspension Take 1 g by mouth 4 times daily       traZODone (DESYREL) 50 MG tablet Take 50 mg by mouth nightly      tiZANidine (ZANAFLEX) 2 MG tablet Take 2 mg by mouth every 8 hours as needed (left knee)       aspirin 81 MG tablet Take 81 mg by mouth daily      clopidogrel (PLAVIX) 75 MG tablet TAKE 1 TABLET DAILY 30 tablet 2    DOCQLACE 100 MG capsule TAKE 1 CAPSULE BY MOUTH IN THE MORNING & IN THE EVENING -DRINK PLENTYOF WATER WHILE TAKING THIS MEDICINE 30 capsule 1    amLODIPine (NORVASC) 10 MG tablet Take 10 mg by mouth daily.  LORazepam (ATIVAN) 0.5 MG tablet Take 0.5 mg by mouth every 6 hours as needed for Anxiety.  therapeutic multivitamin-minerals (THERAGRAN-M) tablet Take 1 tablet by mouth daily.  omeprazole (PRILOSEC) 20 MG capsule Take 20 mg by mouth daily.  venlafaxine (EFFEXOR) 37.5 MG tablet Take 37.5 mg by mouth 3 times daily.  Misc. Devices [facility]) [address] wheelchair with left fupper extremity support  Dx: stroke with left hemiparesis. 1 each 0           Allergies  Allergies   Allergen Reactions    Penicillins Shortness Of Breath and Rash    Amoxicillin         Social   Social History     Tobacco Use    Smoking status: Current Some Day Smoker     Packs/day: 1.00     Years: 30.00     Pack years: 30.00    Smokeless tobacco: Never Used   Substance Use Topics    Alcohol use: Not Currently     Alcohol/week: 16.8 oz     Types: 28 Glasses of wine per week                History reviewed. No pertinent family history. Review of Systems  As per [place] ,other than above 10 systems reviewed negative . Tolerating antibiotics. Physical Exam  /72   Pulse 110   Temp 99.2 °F (37.3 °C) (Axillary)   Resp 18   Ht 5' (1.524 m)   Wt 97 lb 14.2 oz (44.4 kg)   SpO2 98%   BMI 19.12 kg/m²           General Appearance: NAD . Skin: warm and dry, no rash or erythema  Head: normocephalic and atraumatic  Eyes: pupils equal, round  Neck: neck supple and non tender   Pulmonary/Chest: coarse to auscultation bilaterally- with  rhonchi  Cardiovascular: normal rate, regular rhythm, normal S1 and S2, no murmurs.   Abdomen: soft,abdomen tenderness  -distention,  gallbladder drain in place  Extremities: no cyanosis, clubbing   Edema   PICC line in place       Data Review: Recent Labs     05/11/19  0506  05/12/19  0436 05/12/19  0936 05/12/19  1928 05/13/19  0655   WBC 21.4*  --  14.9*  --   --  15.1*   HGB 10.7*   < > 9.0* 10.2* 9.9* 9.3*   HCT 33.8*   < > 28.5* 32.8* 31.0* 29.1*   MCV 87.6  --  86.9  --   --  87.4     --  254  --   --  224    < > = values in this interval not displayed. Recent Labs     05/11/19  0400  05/11/19  2040 05/12/19  0436 05/13/19  0655   *   < > 141 140 138   K 3.6*   < > 4.5 3.3* 3.6*   *   < > 112* 108* 106   CO2 19*   < > 21 23 20   PHOS 2.4*  --   --  1.4* 2.6   BUN 40*   < > 38* 33* 25*   CREATININE 0.42*   < > <0.40* <0.40* <0.40*    < > = values in this interval not displayed. Recent Labs     05/11/19  0400   AST 19   ALT 18   BILIDIR 0.21   BILITOT 0.38   ALKPHOS 196*     No results for input(s): LIPASE, AMYLASE in the last 72 hours. Recent Labs     05/11/19  0400 05/12/19  0436 05/13/19  0655   PROTIME 17.7* 16.6* 15.8*   INR 1.5 1.3 1.3       Imaging Studies:                           All appropriate imaging studies and reports reviewed: Yes       Ct Abdomen Pelvis Wo Contrast Additional Contrast? Oral    Result Date: 4/14/2019  EXAMINATION: CT OF THE ABDOMEN AND PELVIS WITHOUT CONTRAST 4/14/2019 7:34 pm TECHNIQUE: CT of the abdomen and pelvis was performed without the administration of intravenous contrast. Multiplanar reformatted images are provided for review. Dose modulation, iterative reconstruction, and/or weight based adjustment of the mA/kV was utilized to reduce the radiation dose to as low as reasonably achievable. COMPARISON: 04/11/2019 HISTORY: ORDERING SYSTEM PROVIDED HISTORY: ABDOMINAL PAIN TECHNOLOGIST PROVIDED HISTORY: Water soluble contrast only please Ordering Physician Provided Reason for Exam: Abdominal pain - Vented patient. Contrast given via nurse through NG tube. Acuity: Unknown Type of Exam: Unknown Relevant Medical/Surgical History: Hx - Sepsis due to urinary tract infection.  FINDINGS: Lower Chest: New moderate layering bilateral pleural effusions with bilateral lower lobe atelectasis. Organs: Limited evaluation due lack of intravenous contrast.  Cholelithiasis redemonstrated. No gallbladder wall thickening or biliary ductal dilatation. Scattered tiny hypodense lesions in the liver are too small to characterize but statistically represent benign cysts or hemangiomas and appear unchanged. The pancreas, spleen, adrenal glands, and kidneys are unremarkable. There is no hydronephrosis or urinary tract calculus. GI/Bowel: The stomach is distended. Enteric tube is in place. No contrast is seen distal to the pylorus and there is contrast reflux into the distal esophagus. There is no evidence of bowel obstruction. The appendix is not definitely visualized. No focal pericecal inflammatory changes are evident. Pelvis: The urinary bladder is decompressed by Tran catheter. No pelvic mass is seen. Peritoneum/Retroperitoneum: Small amount of free fluid in the pelvic cavity. No free air or focal fluid collection. No abnormal lymph node. Normal abdominal aortic caliber. Moderate calcific atherosclerosis. Bones/Soft Tissues: No acute osseous abnormality. Diffuse anasarca. Moderate degenerative changes in the lumbar spine. 1. Distended, contrast filled stomach with reflux of contrast into the esophagus. No enteric contrast is seen distal to the pylorus suggesting delayed gastric emptying in the setting of ileus. 2. New moderate layering bilateral pleural effusions with bilateral lower lobe atelectasis. 3. Small amount of nonspecific free fluid in the pelvic cavity. 4. Anasarca. 5. Cholelithiasis.      Xr Chest (single View Frontal)    Result Date: 4/13/2019  EXAMINATION: SINGLE XRAY VIEW OF THE CHEST 4/13/2019 7:18 am COMPARISON: April 12, 2019 HISTORY: ORDERING SYSTEM PROVIDED HISTORY: dyspnea TECHNOLOGIST PROVIDED HISTORY: dyspnea Ordering Physician Provided Reason for Exam: dyspnea Acuity: Acute Type of Exam: Subsequent/Follow-up Additional signs and symptoms: dyspnea FINDINGS: A right IJ catheter is seen with its tip terminating at the superior cavoatrial junction. The left chest wall pacemaker and leads are stable. The cardiomediastinal silhouette is stable. There is interval increased opacity at the right lung base, may be related to atelectasis versus pneumonia. There is small atelectasis and mild pleural effusion at the left lung base. There is no pneumothorax. There is no acute osseous abnormality. Interval increased opacity at the right lung base, may be related to atelectasis versus pneumonia. Small atelectasis and mild pleural effusion at the left lung, new since the prior study. Xr Femur Left (min 2 Views)    Result Date: 3/27/2019  EXAMINATION: 4 XRAY VIEWS OF THE LEFT FEMUR; 2 XRAY VIEWS OF THE LEFT KNEE; 3 XRAY VIEWS OF THE LEFT TIBIA AND FIBULA 3/27/2019 7:00 pm COMPARISON: Left hip 11/14/2015. HISTORY: ORDERING SYSTEM PROVIDED HISTORY: pain TECHNOLOGIST PROVIDED HISTORY: pain Ordering Physician Provided Reason for Exam: pt twisted wrong, lt knee pain, unable to straighten knee. Acuity: Acute Type of Exam: Initial FINDINGS: Left femur, four views: The bones are diffusely osteopenic. No acute fracture deformity. The hip joint is maintained. The femoral head projects within the acetabulum. No focal soft tissue abnormality is seen. Left knee, two views: The knee is flexed. No acute osseous abnormality. No joint effusion. Joint spaces are not optimally profiled but no significant arthritic changes are apparent. Left tib-fib, three views: There is evidence of a remote healed distal tibia fracture. No acute fracture or dislocation is seen. No focal soft tissue abnormality. No acute osseous abnormality of the left femur. No acute osseous abnormality of the left knee. No acute osseous abnormality of the left tib-fib. Healed remote distal tibia fracture.  Marked osteopenia, likely due to disuse. Xr Knee Left (1-2 Views)    Result Date: 3/27/2019  EXAMINATION: 4 XRAY VIEWS OF THE LEFT FEMUR; 2 XRAY VIEWS OF THE LEFT KNEE; 3 XRAY VIEWS OF THE LEFT TIBIA AND FIBULA 3/27/2019 7:00 pm COMPARISON: Left hip 11/14/2015. HISTORY: ORDERING SYSTEM PROVIDED HISTORY: pain TECHNOLOGIST PROVIDED HISTORY: pain Ordering Physician Provided Reason for Exam: pt twisted wrong, lt knee pain, unable to straighten knee. Acuity: Acute Type of Exam: Initial FINDINGS: Left femur, four views: The bones are diffusely osteopenic. No acute fracture deformity. The hip joint is maintained. The femoral head projects within the acetabulum. No focal soft tissue abnormality is seen. Left knee, two views: The knee is flexed. No acute osseous abnormality. No joint effusion. Joint spaces are not optimally profiled but no significant arthritic changes are apparent. Left tib-fib, three views: There is evidence of a remote healed distal tibia fracture. No acute fracture or dislocation is seen. No focal soft tissue abnormality. No acute osseous abnormality of the left femur. No acute osseous abnormality of the left knee. No acute osseous abnormality of the left tib-fib. Healed remote distal tibia fracture. Marked osteopenia, likely due to disuse. Xr Knee Left (3 Views)    Result Date: 3/30/2019  EXAMINATION: 3 XRAY VIEWS OF THE LEFT KNEE 3/30/2019 10:58 am COMPARISON: March 27, 2018 HISTORY: ORDERING SYSTEM PROVIDED HISTORY: F/u L knee pain after cast TECHNOLOGIST PROVIDED HISTORY: AP, oblique, lateral F/u L knee pain after cast FINDINGS: Marked osteopenia. Interval casting, which degrades fine osseous detail question cortical offset in the lateral femoral metaphysis. No malalignment identified. No significant joint effusion. Interval casting. Question nondisplaced fracture in the lateral femoral metaphysis. Osteopenia.      Xr Tibia Fibula Left (2 Views)    Result Date: 3/27/2019  EXAMINATION: 4 XRAY VIEWS OF THE LEFT FEMUR; 2 XRAY VIEWS OF THE LEFT KNEE; 3 XRAY VIEWS OF THE LEFT TIBIA AND FIBULA 3/27/2019 7:00 pm COMPARISON: Left hip 11/14/2015. HISTORY: ORDERING SYSTEM PROVIDED HISTORY: pain TECHNOLOGIST PROVIDED HISTORY: pain Ordering Physician Provided Reason for Exam: pt twisted wrong, lt knee pain, unable to straighten knee. Acuity: Acute Type of Exam: Initial FINDINGS: Left femur, four views: The bones are diffusely osteopenic. No acute fracture deformity. The hip joint is maintained. The femoral head projects within the acetabulum. No focal soft tissue abnormality is seen. Left knee, two views: The knee is flexed. No acute osseous abnormality. No joint effusion. Joint spaces are not optimally profiled but no significant arthritic changes are apparent. Left tib-fib, three views: There is evidence of a remote healed distal tibia fracture. No acute fracture or dislocation is seen. No focal soft tissue abnormality. No acute osseous abnormality of the left femur. No acute osseous abnormality of the left knee. No acute osseous abnormality of the left tib-fib. Healed remote distal tibia fracture. Marked osteopenia, likely due to disuse. Xr Abdomen (kub) (single Ap View)    Result Date: 4/14/2019  EXAMINATION: SINGLE SUPINE XRAY VIEW(S) OF THE ABDOMEN 4/14/2019 7:39 am COMPARISON: CT abdomen and pelvis  film from 11 April 2019 HISTORY: [address] [place] [address] Avenue: Abd Distention TECHNOLOGIST PROVIDED HISTORY: Abd Distention Ordering Physician Provided Reason for Exam: Abdominal distention. Pt was moving around in the bed. Best films at present time. Acuity: Acute Type of Exam: Initial Additional signs and symptoms: Abdominal distention. Pt was moving around in the bed. Best films at present time. FINDINGS: Portable view time stamped at 748 hours demonstrates an intestinal tube terminating in the midportion of a gaseous Spike dilated stomach.   Densities are present over the stomach likely medication. Bipolar pacemaker is in situ with intact leads. Heart size is top-normal, stable. Gaseous distension of the stomach and loop of bowel in the upper mid abdomen is noted but there is gas and fecal material in the rectum. Gastric outlet obstruction or proximal small bowel partial obstruction is suspected. No free air is noted. Midline city is present over the pelvis likely a monitor or CT small bore catheter. Vascular calcification is present in the pelvis. Persistent preferential gaseous distension of the stomach although there is some gas in the upper abdomen and gas and fecal material present in the rectosigmoid. Findings suggest gastric outlet obstruction. Ct Abdomen Pelvis W Iv Contrast    Result Date: 4/11/2019  EXAMINATION: CT OF THE ABDOMEN AND PELVIS WITH CONTRAST 4/11/2019 5:14 pm TECHNIQUE: CT of the abdomen and pelvis was performed with the administration of intravenous contrast. Multiplanar reformatted images are provided for review. Dose modulation, iterative reconstruction, and/or weight based adjustment of the mA/kV was utilized to reduce the radiation dose to as low as reasonably achievable. COMPARISON: None. HISTORY: ORDERING SYSTEM PROVIDED HISTORY: Abdominal pain TECHNOLOGIST PROVIDED HISTORY: IV Only Contrast Ordering Physician Provided Reason for Exam: patient c/o abd pain for an hour FINDINGS: Lower Chest: Trace pleural fluid bilaterally is noted. Indwelling cardiac pacemaker is present. Heart size is normal.  Coronary artery calcifications are evident. The esophagus is significantly dilated and fluid-filled. No paraesophageal adenopathy is evident. Organs: The spleen, pancreas, and adrenals are unremarkable. The liver contains hypodensities, likely cysts. The gallbladder is distended and contains a solitary gallstone. The kidneys excrete contrast bilaterally. Extrarenal collecting systems are noted.   The ureters are mildly dilated down to the urinary bladder Acuity: Acute Type of Exam: Initial 73-year-old female on ventilator; check endotracheal tube placement FINDINGS: Portable AP upright view of the chest. Endotracheal tube distal tip overlying the mid trachea approximately 4.1 cm above the level of the ron. Enteric tube traverses the GE junction with distal tip excluded from the field of view. Left subclavian approach cardiac pacemaker device distal lead tips relatively stable in position. Right internal jugular approach central venous catheter distal tip overlying the high right atrium, stable. Cardiac monitor leads overlie the chest. Atherosclerotic calcification of the thoracic aorta. Slight stable volume loss of the left hemithorax. No pneumothorax. No free air. Dense retrocardiac/left basilar airspace consolidation and small left-sided pleural effusion. Stable mild focal opacity at the right mid lung zone. Underlying COPD. Stable mild pulmonary vascular congestion and left-sided predominant parahilar opacity. Visualized osseous structures remain unchanged. 1. Stable multifocal airspace disease as detailed above with dense retrocardiac/left basilar airspace consolidation and small left-sided pleural effusion. Mild pulmonary vascular congestion. Findings may represent edema or multifocal pneumonia. 2. Underlying COPD. 3. Tubes and line as detailed above. Xr Chest Portable    Result Date: 4/15/2019  EXAMINATION: SINGLE XRAY VIEW OF THE CHEST 4/15/2019 6:47 am COMPARISON: 14 April 2019 HISTORY: ORDERING SYSTEM PROVIDED HISTORY: ETT placement TECHNOLOGIST PROVIDED HISTORY: ETT placement Ordering Physician Provided Reason for Exam: on vent Acuity: Acute Type of Exam: Initial FINDINGS: AP portable view of the chest time stamped at 612 hours demonstrates overlying cardiac monitoring electrodes. Endotracheal tube terminates 4 cm above the ron. Bipolar pacemaker enters from the left with intact leads in appropriate positions.   Intestinal tube extends beyond the fundus of the stomach, tip not included. Right internal jugular catheter terminates at the cavoatrial junction. Heart size is normal.  Aortic arch is calcified. There is interval improvement in vascular congestion with resolution of perihilar opacities. Some bibasilar opacities remain. No extrapleural air is noted. Osseous structures are stable. Interval improvement in vascular congestion and bilateral opacities consistent with resolving pulmonary edema. Tubes and lines as above. Xr Chest Portable    Result Date: 4/14/2019  EXAMINATION: SINGLE XRAY VIEW OF THE CHEST 4/14/2019 7:57 am COMPARISON: Portable chest 04/13/2019. HISTORY: ORDERING SYSTEM PROVIDED HISTORY: Intubation TECHNOLOGIST PROVIDED HISTORY: Intubation Ordering Physician Provided Reason for Exam: intubation Acuity: Acute Type of Exam: Initial Additional signs and symptoms: intubation FINDINGS: Endotracheal tube terminates over the midthoracic trachea. Dual-chamber pacemaker leads appear unchanged in position. Right IJ approach central venous catheter unchanged in position. Heart size not substantially changed. Perihilar and basilar opacities further increased. Left pleural effusion increased in size. Findings may reflect pulmonary edema, progressed from yesterday's exam.  Left pleural effusion increased in size. Xr Chest Portable    Result Date: 4/12/2019  EXAMINATION: SINGLE XRAY VIEW OF THE CHEST 4/12/2019 5:26 am COMPARISON: November 14, 2015. HISTORY: ORDERING SYSTEM PROVIDED HISTORY: line placement TECHNOLOGIST PROVIDED HISTORY: line placement Ordering Physician Provided Reason for Exam: New right side line placement. Acuity: Acute Type of Exam: Initial Additional signs and symptoms: New right side line placement. FINDINGS: Stable left pectoral trans venous cardiac pacer device. New right IJ central venous catheter with tip near the superior atrial caval junction. Normal lung volume.   No new

## 2019-05-13 NOTE — PROGRESS NOTES
Pulmonary Progress Note  [facility]      Patient - Ofelia Marcus,  Age - 77 y.o.    - 1950      Room Number - 2066/2066-01   N -  [id number]   [facility] # - [de-identified]  Date of Admission -  2019  2:48 PM        Consulting Rubén Cortes MD  Primary Care Physician - Steven Newman, DO     SUBJECTIVE   Feels fine, on 3 L O2   denies any fever or chills, no chest pain  No short of breath cough or wheezing    OBJECTIVE   VITALS    height is 5' (1.524 m) and weight is 97 lb 14.2 oz (44.4 kg). Her oral temperature is 99 °F (37.2 °C). Her blood pressure is 123/72 and her pulse is 94. Her respiration is 15 and oxygen saturation is 100%. Body mass index is 19.12 kg/m². Temperature Range: Temp: 99 °F (37.2 °C) Temp  Av.4 °F (36.9 °C)  Min: 97.3 °F (36.3 °C)  Max: 99.2 °F (37.3 °C)  BP Range:  Systolic (56JSF), GHB:582 , Min:108 , DUM:888     Diastolic (30AOQ), GR, Min:48, Max:79    Pulse Range: Pulse  Av.5  Min: 94  Max: 127  Respiration Range: Resp  Av.2  Min: 15  Max: 30  Current Pulse Ox[de-identified]  SpO2: 100 %  24HR Pulse Ox Range:  SpO2  Av.7 %  Min: 90 %  Max: 100 %  Oxygen Amount and Delivery: O2 Flow Rate (L/min): 3 L/min    Wt Readings from Last 3 Encounters:   19 97 lb 14.2 oz (44.4 kg)   19 89 lb (40.4 kg)   19 94 lb 1.6 oz (42.7 kg)       I/O (24 Hours)    Intake/Output Summary (Last 24 hours) at 2019 1519  Last data filed at 2019 1032  Gross per 24 hour   Intake 230 ml   Output 300 ml   Net -70 ml       EXAM     General Appearance  Awake, alert, in no acute distress  HEENT - normocephalic, atraumatic.   Neck - Supple,  trachea midline no JVD  Lungs - coarse, no wheezing or distress  Cardiovascular - Heart sounds are normal.  Regular rate and rhythm   Abdomen - Soft, nontender, nondistended, no guarding  Neurologic - most comfortable, following commands  Skin - No bruising or bleeding  Extremities - No clubbing, cyanosis, edema    MEDS      metoprolol  5 mg Intravenous Q12H    pantoprazole  40 mg Intravenous Daily    And    sodium chloride (PF)  10 mL Intravenous Daily    alteplase  1 mg Intracatheter Once    levalbuterol  1.25 mg Nebulization Q4H    ipratropium  0.5 mg Nebulization Q4H    heparin (porcine)  5,000 Units Subcutaneous BID    meropenem  1 g Intravenous Q8H    mometasone-formoterol  2 puff Inhalation BID    [Held by provider] metoprolol tartrate  12.5 mg Oral BID    fat emulsion  100 mL Intravenous Daily    magnesium sulfate  1 g Intravenous Once    insulin lispro  0-12 Units Subcutaneous Q6H    venlafaxine  37.5 mg Oral TID    aspirin  81 mg Oral Daily    sodium chloride flush  10 mL Intravenous 2 times per day      PN-Adult 2-in-1 Central Line (Standard) Stopped (05/13/19 0452)    dextrose 5% and 0.45% NaCl with KCl 20 mEq 100 mL/hr at 05/13/19 0452    dexmedetomidine (PRECEDEX) IV infusion Stopped (05/12/19 0711)    dextrose       haloperidol lactate, diatrizoate meglumine-sodium, sodium chloride, sodium chloride flush, morphine, LORazepam, ondansetron, zolpidem, sodium chloride flush, hydrOXYzine, metoprolol, sodium chloride nebulizer, fentanNYL, oxyCODONE-acetaminophen, magnesium sulfate, sodium phosphate IVPB **OR** sodium phosphate IVPB, potassium chloride **OR** potassium alternative oral replacement **OR** potassium chloride, glucose, dextrose, glucagon (rDNA), dextrose, milk and molasses, sodium chloride flush, acetaminophen    LABS   CBC   Recent Labs     05/13/19  0655   WBC 15.1*   HGB 9.3*   HCT 29.1*   MCV 87.4        BMP:   Lab Results   Component Value Date     05/13/2019    K 3.6 05/13/2019     05/13/2019    CO2 20 05/13/2019    BUN 25 05/13/2019    LABALBU 1.9 05/11/2019    LABALBU 4.6 05/17/2012    CREATININE <0.40 05/13/2019    CALCIUM 7.4 05/13/2019    GFRAA CANNOT BE CALCULATED 05/13/2019

## 2019-05-13 NOTE — PROGRESS NOTES
Nutrition Assessment    Type and Reason for Visit: Reassess    Nutrition Recommendations: Continue Clear liquid diet as tolerated. Will provide further nutrition intervention if requested. Nutrition Assessment: Patient is declining from a nutritional stanpoint as evidence by continued poor oral intake and discontinued TPN and lipids. Patient has refused surgical intervention for bloody stool. Poor prognosis, patient is considering Hospice care. Will monitor for further plan of care. Malnutrition Assessment:  · Malnutrition Status: Insufficient data  · Context: Acute illness or injury  · Findings of the 6 clinical characteristics of malnutrition (Minimum of 2 out of 6 clinical characteristics is required to make the diagnosis of moderate or severe Protein Calorie Malnutrition based on AND/ASPEN Guidelines):  1. Energy Intake-Less than or equal to 75% of estimated energy requirement(Previously; improving with parenteral nutrition), Greater than or equal to 5 days    2. Weight Loss-Unable to assess(edema present),    3. Fat Loss-Unable to assess,    4. Muscle Loss-Unable to assess,    5. Fluid Accumulation-Mild fluid accumulation(Mild to moderate), Extremities  6.  Strength-Not measured    Nutrition Risk Level: High    Nutrient Needs:  · Estimated Daily Total Kcal: 9399-5375 based on 35-39 kcal per kg of usual body wt  · Estimated Daily Protein (g): 63-68 based on 1.4-1.5 gm/kg IBW(revised)    Nutrition Diagnosis:   · Problem: Inadequate oral intake  · Etiology: related to Alteration in GI function, Insufficient energy/nutrient consumption     Signs and symptoms:  as evidenced by NPO status due to medical condition, GI abnormality(TPN discontinued)    Objective Information:  · Nutrition-Focused Physical Findings: NG discontinued.  Ongoing bloody stools  · Wound Type: Deep Tissue Injury, Multiple, Stage II, Pressure Ulcer(Wound under cast)  · Current Nutrition Therapies:  · Oral Diet Orders: Clear Liquid · Oral Diet intake: 1-25%  · Oral Nutrition Supplement (ONS) Orders: None  · ONS intake: NPO  · Anthropometric Measures:  · Ht: 5' (152.4 cm)   · Current Body Wt: 89 lb 8.1 oz (40.6 kg)(Review of chart indicates current wt may be usual body wt (compared to 3-30-19))  · Admission Body Wt: 102 lb (46.3 kg)  · Usual Body Wt: 89 lb 1.1 oz (40.4 kg)(3-30-19)  · Ideal Body Wt: 100 lb (45.4 kg), % Ideal Body 89% (based on wt 5-12)  · Adjusted Body Wt:  , body weight adjusted for    · BMI Classification: BMI <18.5 Underweight    Nutrition Interventions:   Continue NPO, Discontinue Parenteral Nutrition  Continued Inpatient Monitoring    Nutrition Evaluation:   · Evaluation: Progress towards goals declining   · Goals: PN to meet greater 90% of estimated nutrition needs    · Monitoring: Nutrition Progression, Weight, Pertinent Labs, Monitor Bowel Function, Diet Tolerance, Skin Integrity, I&O      Eugene DAVIDSON, R.D, L.D,  Clinical Dietitian  Cell # [phone]  Office # [phone]

## 2019-05-13 NOTE — PROGRESS NOTES
Bedside reporting done, per Brain Spaulding and Amanda Luna, Rn's  PICC line intact, no redness noted  Depends dry at this time  BEd alarm on  O 2  Cont. On per NC  Pt.  Has noc/o of pain or SOB at this time

## 2019-05-13 NOTE — PLAN OF CARE
Problem: Risk for Impaired Skin Integrity  Goal: Tissue integrity - skin and mucous membranes  Description  Structural intactness and normal physiological function of skin and  mucous membranes. 5/13/2019 0357 by Alan Tay RN  Outcome: Ongoing  Note:   Skin integrity improved/maintained this shift. See head to toe assessment. Problem: Falls - Risk of:  Goal: Will remain free from falls  Description  Will remain free from falls  5/13/2019 0357 by Alan Tay RN  Outcome: Ongoing  Note:   Pt. Free of falls and injuries this shift. Problem: Infection, Septic Shock:  Goal: Will show no infection signs and symptoms  Description  Will show no infection signs and symptoms  5/13/2019 0357 by Alan Tay RN  Outcome: Ongoing     Problem: Pain:  Goal: Pain level will decrease  Description  Pain level will decrease  5/13/2019 0357 by Alan Tay RN  Outcome: Ongoing  Note:   Adequate pain control achieved this shift. See MAR.

## 2019-05-13 NOTE — PLAN OF CARE
Nutrition Problem: Inadequate oral intake  Intervention: Food and/or Nutrient Delivery: Continue NPO, Discontinue Parenteral Nutrition  Nutritional Goals: PN to meet greater 90% of estimated nutrition needs

## 2019-05-13 NOTE — PROGRESS NOTES
[address] [facility].    PROGRESS NOTE             5/13/2019    9:27 AM    Name:   Will Steiner  MRN:     [id number]     Acct:      [de-identified]   Room:   2066/2066-01   Day:  28  Admit Date:  4/11/2019  2:48 PM    PCP:  Arianne Ponce DO  Code Status:  DNR-CC    Subjective:     C/C:   Chief Complaint   Patient presents with    Abdominal Pain     Interval History Status: not changed. Patient seen and examined at bedside. Patient's code status changed yesterday, scheduled for meeting with hospice today PM. Patient reports feeling improved since yesterday, tolerated liquids for breakfast. Denies any further needs at this time. Brief History:     Margarita Amaral is a 77 y.o. female who was admitted for the management of  sepsis 2/2 UTI and pneumonia. Pt developed cholecystitis, GI bleed, SBO, ileus, refusing surgical intervention. Review of Systems:     Review of Systems   Constitutional: Positive for fatigue. Negative for fever. HENT:        Dry mouth    Eyes: Negative for visual disturbance. Respiratory: Negative for shortness of breath. Cardiovascular: Negative for chest pain and palpitations. Gastrointestinal: Positive for diarrhea. Negative for abdominal pain, constipation, nausea and vomiting. Genitourinary: Negative for flank pain. Musculoskeletal: Negative for myalgias. Neurological: Negative for weakness and headaches. Medications: Allergies:     Allergies   Allergen Reactions    Penicillins Shortness Of Breath and Rash    Amoxicillin        Current Meds:   Scheduled Meds:    metoprolol  5 mg Intravenous Q12H    pantoprazole  40 mg Intravenous Daily    And    sodium chloride (PF)  10 mL Intravenous Daily    alteplase  1 mg Intracatheter Once    levalbuterol  1.25 mg Nebulization Q4H    ipratropium  0.5 mg Nebulization Q4H    heparin (porcine)  5,000 Units Subcutaneous BID    meropenem  1 g 14.2 oz (44.4 kg)   SpO2 98%   BMI 19.12 kg/m²   Temp (24hrs), Av.2 °F (36.8 °C), Min:97.3 °F (36.3 °C), Max:99.2 °F (37.3 °C)    Recent Labs     19  1323 19  2119 19  0001 19  0646   POCGLU 123* 98 105 87       I/O(24Hr): Intake/Output Summary (Last 24 hours) at 2019 09  Last data filed at 2019 9075  Gross per 24 hour   Intake 230 ml   Output 175 ml   Net 55 ml       Labs:    [unfilled]    Lab Results   Component Value Date/Time    SPECIAL right hand 1cc 05/10/2019 10:18 AM     Lab Results   Component Value Date/Time    CULTURE NO GROWTH 3 DAYS 05/10/2019 10:18 AM       [facility]    Radiology:    Ct Abdomen Pelvis Wo Contrast Additional Contrast? Oral    Result Date: 2019  EXAMINATION: CT OF THE ABDOMEN AND PELVIS WITHOUT CONTRAST 2019 7:34 pm TECHNIQUE: CT of the abdomen and pelvis was performed without the administration of intravenous contrast. Multiplanar reformatted images are provided for review. Dose modulation, iterative reconstruction, and/or weight based adjustment of the mA/kV was utilized to reduce the radiation dose to as low as reasonably achievable. COMPARISON: 2019 HISTORY: ORDERING SYSTEM PROVIDED HISTORY: ABDOMINAL PAIN TECHNOLOGIST PROVIDED HISTORY: Water soluble contrast only please Ordering Physician Provided Reason for Exam: Abdominal pain - Vented patient. Contrast given via nurse through NG tube. Acuity: Unknown Type of Exam: Unknown Relevant Medical/Surgical History: Hx - Sepsis due to urinary tract infection. FINDINGS: Lower Chest: New moderate layering bilateral pleural effusions with bilateral lower lobe atelectasis. Organs: Limited evaluation due lack of intravenous contrast.  Cholelithiasis redemonstrated. No gallbladder wall thickening or biliary ductal dilatation. Scattered tiny hypodense lesions in the liver are too small to characterize but statistically represent benign cysts or hemangiomas and appear unchanged.  The pancreas, spleen, adrenal glands, and kidneys are unremarkable. There is no hydronephrosis or urinary tract calculus. GI/Bowel: The stomach is distended. Enteric tube is in place. No contrast is seen distal to the pylorus and there is contrast reflux into the distal esophagus. There is no evidence of bowel obstruction. The appendix is not definitely visualized. No focal pericecal inflammatory changes are evident. Pelvis: The urinary bladder is decompressed by Tran catheter. No pelvic mass is seen. Peritoneum/Retroperitoneum: Small amount of free fluid in the pelvic cavity. No free air or focal fluid collection. No abnormal lymph node. Normal abdominal aortic caliber. Moderate calcific atherosclerosis. Bones/Soft Tissues: No acute osseous abnormality. Diffuse anasarca. Moderate degenerative changes in the lumbar spine. 1. Distended, contrast filled stomach with reflux of contrast into the esophagus. No enteric contrast is seen distal to the pylorus suggesting delayed gastric emptying in the setting of ileus. 2. New moderate layering bilateral pleural effusions with bilateral lower lobe atelectasis. 3. Small amount of nonspecific free fluid in the pelvic cavity. 4. Anasarca. 5. Cholelithiasis. Xr Chest (single View Frontal)    Result Date: 5/11/2019  EXAMINATION: ONE XRAY VIEW OF THE CHEST 5/11/2019 6:28 am COMPARISON: 10 May 2019 HISTORY: ORDERING SYSTEM PROVIDED HISTORY: Respiratory failure TECHNOLOGIST PROVIDED HISTORY: Respiratory failure Ordering Physician Provided Reason for Exam: Respiratory failure Acuity: Unknown Type of Exam: Unknown FINDINGS: AP portable view of the chest time stamped at 603 hours demonstrates findings of generalized COPD. Overlying cardiac monitoring electrodes are present. An intestinal tube extends below the fundus of the stomach, tip not included. Right central line terminates in the distal superior vena cava.   Bipolar pacemaker enters from the left with intact leads in appropriate positions. Heart size is stable, top-normal.  Lung fields are hyperinflated suggestive of COPD. Interstitial markings are coarsened, similar to that noted in 2015 likely chronic interstitial change. There is improved aeration at the left lung base with slight blunting of the left costophrenic angle suggesting effusion. There is been interval clearing of right basilar opacities likely resolved atelectasis. Osseous and mediastinal structures are age-appropriate. No vascular congestion or extrapleural air is noted. Improved aeration of both lung bases with resolved right basilar opacity. Mild left basilar opacity persists with blunting of the left costophrenic angle with small effusion on the left suspected. Findings of COPD and suspected chronic fibrotic change are noted. No extrapleural air. Tubes and lines as above. Xr Chest (single View Frontal)    Result Date: 5/2/2019  EXAMINATION: SINGLE XRAY VIEW OF THE CHEST 5/2/2019 6:34 am COMPARISON: 29 April 2019 HISTORY: ORDERING SYSTEM PROVIDED HISTORY: COPD TECHNOLOGIST PROVIDED HISTORY: COPD Ordering Physician Provided Reason for Exam: COPD Acuity: Acute Type of Exam: Initial FINDINGS: AP portable view of the chest time stamped at 630 hours demonstrates stable cardiac size. Overlying cardiac monitoring electrodes are present. Right-sided PICC line terminates in the right atrium. Bipolar pacemaker enters from the left with intact leads in appropriate positions. There is been no significant interval change in bilateral interstitial opacities, representing interval change since 2015. This may be related to worsening interstitial disease or superimposed venous congestion. Bilateral effusions are noted with bibasilar opacities, either atelectasis or edema. Continued bilateral effusions, bibasilar opacities and bilateral interstitial opacities in the upper lobes.   Findings may be related to worsening interstitial disease and edema. Airspace disease is not excluded. Xr Chest (single View Frontal)    Result Date: 4/13/2019  EXAMINATION: SINGLE XRAY VIEW OF THE CHEST 4/13/2019 7:18 am COMPARISON: April 12, 2019 HISTORY: ORDERING SYSTEM PROVIDED HISTORY: dyspnea TECHNOLOGIST PROVIDED HISTORY: dyspnea Ordering Physician Provided Reason for Exam: dyspnea Acuity: Acute Type of Exam: Subsequent/Follow-up Additional signs and symptoms: dyspnea FINDINGS: A right IJ catheter is seen with its tip terminating at the superior cavoatrial junction. The left chest wall pacemaker and leads are stable. The cardiomediastinal silhouette is stable. There is interval increased opacity at the right lung base, may be related to atelectasis versus pneumonia. There is small atelectasis and mild pleural effusion at the left lung base. There is no pneumothorax. There is no acute osseous abnormality. Interval increased opacity at the right lung base, may be related to atelectasis versus pneumonia. Small atelectasis and mild pleural effusion at the left lung, new since the prior study. Xr Chest Standard (2 Vw)    Result Date: 4/29/2019  EXAMINATION: TWO VIEWS OF THE CHEST 4/29/2019 8:04 pm COMPARISON: 04/27/2019 HISTORY: ORDERING SYSTEM PROVIDED HISTORY: Follow-up lung infiltrate TECHNOLOGIST PROVIDED HISTORY: Follow-up lung infiltrate Ordering Physician Provided Reason for Exam: Follow-up infiltrate. Pt unable to lean forward - unable to get proper sponge behind pt for lateral. Pt unable to raise left arm at all for lateral. Best possible lateral obtained. Acuity: Unknown Type of Exam: Unknown Additional signs and symptoms: Follow-up infiltrate. Pt unable to lean forward - unable to get proper sponge behind pt for lateral. Pt unable to raise left arm at all for lateral. Best possible lateral obtained. FINDINGS: Left chest cardiac pacemaker device is in place. Right IJ central venous catheter in place with distal tip at the cavoatrial junction. Heart size is within normal limits. No vascular congestion. There are small to moderate pleural effusions. There are interstitial opacities in the upper lungs, which could be related to scarring and/or developing infiltrate, slightly improved in appearance when compared to 04/27/2019. No evidence of pneumothorax. 1.  Small to moderate bilateral pleural effusions, better visualized on lateral view. 2.  Upper lobe interstitial opacities, slightly improved when compared to the previous study, possibly related to underlying fibrotic change or residual infiltrate. Xr Knee Left (1-2 Views)    Result Date: 5/8/2019  EXAMINATION: 2 XRAY VIEWS OF THE LEFT KNEE 5/7/2019 11:19 am COMPARISON: Left knee radiographs 03/30/2019. HISTORY: ORDERING SYSTEM PROVIDED HISTORY: fracture TECHNOLOGIST PROVIDED HISTORY: fracture Ordering Physician Provided Reason for Exam: left knee/distal femur pain Acuity: Acute Type of Exam: Initial FINDINGS: Bones are osteopenic. No suprapatellar joint effusion. There is a impacted, transverse fracture of the distal femoral metadiaphysis. Increased sclerosis along the fracture line suggests interval healing. Fracture fragments are in unchanged, near anatomic alignment. No acute dislocation. No new fracture is seen. Impacted, transverse fracture of the distal femoral metadiaphysis is in unchanged alignment and demonstrates interval healing.      Xr Abdomen (kub) (single Ap View)    Result Date: 5/10/2019  EXAMINATION: ONE SUPINE XRAY VIEW(S) OF THE ABDOMEN 5/10/2019 9:14 am COMPARISON: Small-bowel series 05/09/2019 HISTORY: ORDERING SYSTEM PROVIDED HISTORY: Small bowel obstruction ([address] TECHNOLOGIST PROVIDED HISTORY: TO ACCOMPANY SMALL BOWEL EXAM SMALL BOWEL OBSTRUCTION Ordering Physician Provided Reason for Exam: SMALL BOWEL FOLLOW THRU - 20 HOUR DELAYED FILM FINDINGS: Significant interval decreased amount of retained contrast in the stomach compared to last image from earlier small bowel series which likely relates to suctioning of the contrast.  Majority of previously seen contrast within the pelvis likely in the rectum is no longer visualized and has likely passed through. Residual contrast remains within the colon and small bowel. NG tube is in place with side hole in the region of the GE junction. Partially visualized mild left pleural effusion associated atelectasis. 1. Interval presumed suctioning of contrast from the stomach which now appears relatively empty. Continued progression of contrast through the small and large bowel as described. 2. NG tube side hole at the level of the GE junction, consider advancement of 2-3 cm. Xr Abdomen (kub) (single Ap View)    Result Date: 5/8/2019  EXAMINATION: SINGLE SUPINE XRAY VIEW(S) OF THE ABDOMEN 5/8/2019 8:59 am COMPARISON: CT abdomen from 05/05/2019, and KUB from 04/19/2019 HISTORY: ORDERING SYSTEM PROVIDED HISTORY: recheck obstruction TECHNOLOGIST PROVIDED HISTORY: recheck obstruction Ordering Physician Provided Reason for Exam: recheck obstruction Acuity: Unknown Type of Exam: Unknown FINDINGS: Again noted cholecystostomy tube, electrode leads from a pacemaker overlying the heart, and overlying electrode leads/tubing. NG tube no longer demonstrated. Gas-filled bowel loops without marked dilatation; cecum measures 8 cm, slightly increased as compared to the previous study. No obvious free air. No mass or organomegaly. Bones unchanged. Retrocardiac opacity, hyperinflated lungs and probable pleural effusions. NG tube removed. Gas-filled bowel more suggestive ileus; no clear cut obstruction. Cecum measures 8 cm. Cholecystostomy tube in unchanged position. Additional unchanged findings, as above.  RECOMMENDATION: Continued clinical correlation and follow-up     Xr Abdomen (kub) (single Ap View)    Result Date: 4/19/2019  EXAMINATION: SINGLE SUPINE XRAY VIEW(S) OF THE ABDOMEN 4/19/2019 9:48 am COMPARISON: April 14, 2019 HISTORY: ORDERING SYSTEM PROVIDED HISTORY: pain TECHNOLOGIST PROVIDED HISTORY: pain Ordering Physician Provided Reason for Exam: Abdominal pain and distentsion Acuity: Acute Type of Exam: Initial Additional signs and symptoms: Abdominal pain and distentsion FINDINGS: Enteric tube with the tip within the stomach. Small left pleural effusion. Minimal right pleural effusion. Mildly dilated loops of bowel. Nonspecific bowel gas pattern with mildly dilated loops of bowel. Xr Abdomen (kub) (single Ap View)    Result Date: 4/14/2019  EXAMINATION: SINGLE SUPINE XRAY VIEW(S) OF THE ABDOMEN 4/14/2019 7:39 am COMPARISON: CT abdomen and pelvis  film from 11 April 2019 HISTORY: [address]: Abd Distention TECHNOLOGIST PROVIDED HISTORY: Abd Distention Ordering Physician Provided Reason for Exam: Abdominal distention. Pt was moving around in the bed. Best films at present time. Acuity: Acute Type of Exam: Initial Additional signs and symptoms: Abdominal distention. Pt was moving around in the bed. Best films at present time. FINDINGS: Portable view time stamped at 748 hours demonstrates an intestinal tube terminating in the midportion of a gaseous Spike dilated stomach. Densities are present over the stomach likely medication. Bipolar pacemaker is in situ with intact leads. Heart size is top-normal, stable. Gaseous distension of the stomach and loop of bowel in the upper mid abdomen is noted but there is gas and fecal material in the rectum. Gastric outlet obstruction or proximal small bowel partial obstruction is suspected. No free air is noted. [place]line [place] is present over the pelvis likely a monitor or CT small bore catheter. Vascular calcification is present in the pelvis. Persistent preferential gaseous distension of the stomach although there is some gas in the upper abdomen and gas and fecal material present in the rectosigmoid. Findings suggest gastric outlet obstruction.      Ct images of the abdomen was obtained over a total period of 60 minutes COMPARISON: None. HISTORY: ORDERING SYSTEM PROVIDED HISTORY: GI BLEEDING TECHNOLOGIST PROVIDED HISTORY: Ordering Physician Provided Reason for Exam: GI bleeding Acuity: Unknown Type of Exam: Unknown FINDINGS: Activity is seen within the blood pool, including the great vessels, heart, liver, and spleen. There was no abnormal accumulation of radioactivity in the abdomen to suggest active bleeding during study acquisition. No evidence of active GI bleeding during acquisition. RECOMMENDATIONS: If the patient shows hemodynamic signs of an active bleed in the next 20 hours, additional images can be acquired. Kate Anthony Device Plmt/replace/removal    Result Date: 4/30/2019  PROCEDURE: ULTRASOUND GUIDED VASCULAR ACCESS. FLUOROSCOPY GUIDED PICC PLACEMENT 4/30/2019. HISTORY: ORDERING SYSTEM PROVIDED HISTORY: TPN TECHNOLOGIST PROVIDED HISTORY: TPN Lumen?->Double Lumen SEDATION: None FLUOROSCOPY DOSE AND TYPE OR TIME AND EXPOSURES: 7 seconds; D  TECHNIQUE: This procedure was performed by Dr. Nita Swanson. Informed consent was obtained after a detailed explanation of the procedure including risks, benefits, and alternatives. Universal protocol was observed. The right arm was prepped and draped in sterile fashion using maximum sterile barrier technique. Local anesthesia was achieved with lidocaine. A micropuncture needle was used to access the right basilic vein using ultrasound guidance. An ultrasound image demonstrating patency of the vein with needle tip located within it. An image was obtained and stored in PACs. A 0.018 guidewire was used to place a peel-a-way sheath and a 5 [place] dual-lumen PICC was advanced with fluoroscopic guidance with the tip at the cavo-atrial junction. The catheter flushed easily and there was a good blood return. The catheter was secured to the skin.   The patient tolerated the procedure well and there were no immediate complications. FINDINGS: Fluoroscopic image demonstrates the tip of the catheter at the cavo-atrial junction. Successful ultrasound and fluoroscopy guided PICC placement     Us Gallbladder Ruq    Result Date: 4/19/2019  EXAMINATION: RIGHT UPPER QUADRANT ULTRASOUND 4/19/2019 10:48 am COMPARISON: CT abdomen and pelvis 4/14/2018 HISTORY: ORDERING SYSTEM PROVIDED HISTORY: ABDOMINAL PAIN FINDINGS: Pancreas is not well visualized. No liver lesion. Gallbladder wall thickening. Gallbladder sludge. Cholelithiasis. Pericholecystic fluid. Common bile duct measures at the upper limits of normal at 7 mm. Findings suggesting acute cholecystitis. Xr Chest Portable    Result Date: 5/10/2019  EXAMINATION: ONE XRAY VIEW OF THE CHEST 5/10/2019 1:28 am COMPARISON: May 2, 2019. HISTORY: ORDERING SYSTEM PROVIDED HISTORY: low o2 sat TECHNOLOGIST PROVIDED HISTORY: low o2 sat Ordering Physician Provided Reason for Exam: Low o2 sat Acuity: Acute Type of Exam: Initial FINDINGS: Hyperexpanded right lung volume. Left greater than right basilar heterogeneous opacities with left basilar consolidation. Small bilateral pleural effusions. Stable cardiomediastinal silhouette and great vessels. Enteric contrast in the stomach and distal esophagus. Enteric tube courses into the stomach but tip is not definitely seen due to contrast in the stomach. Stable left pectoral cardiac pacer device. Stable right PICC. Left greater than right basilar heterogeneous opacities with left basilar consolidation. Differential considerations include atelectasis and an infectious/inflammatory process. Small bilateral pleural effusions. Enteric contrast in the stomach and distal esophagus. Enteric tube courses into the stomach but tip is not definitely seen due to contrast in the stomach.      Xr Chest Portable    Result Date: 4/27/2019  EXAMINATION: SINGLE XRAY VIEW OF THE CHEST 4/27/2019 8:47 am COMPARISON: 04/26/2019 HISTORY: ORDERING SYSTEM PROVIDED HISTORY: Assess for pulm edema vs PNA TECHNOLOGIST PROVIDED HISTORY: Assess for pulm edema vs PNA Ordering Physician Provided Reason for Exam: cough, congestion Acuity: Acute Type of Exam: Initial FINDINGS: Right IJ central venous catheter is in place tip in the SVC right atrial junction. Pacer wires are in place. The heart and mediastinal structures are stable. Pleural effusions are present with bibasilar atelectasis. Bilateral lung infiltrates are present in the upper lung fields. Persistent pleural effusions with bibasilar atelectasis and bilateral lung infiltrates with areas of consolidation developing in the upper lobes. Xr Chest Portable    Result Date: 4/26/2019  EXAMINATION: SINGLE XRAY VIEW OF THE CHEST 4/26/2019 6:51 am COMPARISON: April 24, 2019 HISTORY: ORDERING SYSTEM PROVIDED HISTORY: Pleural effusions TECHNOLOGIST PROVIDED HISTORY: Pleural effusions Ordering Physician Provided Reason for Exam: pleural effusion Acuity: Acute Type of Exam: Initial FINDINGS: Right IJ line in good position. Pacer device is stable. Cardiac leads overlie the chest.  Stable cardiomediastinal contours with calcified aortic arch. Left effusion and associated airspace disease, slightly decreased. Chronic blunting of right costophrenic sulcus may represent scarring or chronic effusion. Increased reticular markings right upper chest and left upper chest possibly interstitial edema or interstitial pneumonia. No new airspace consolidation. Increasing reticular markings upper chest bilaterally right greater than left possibly due to edema or interstitial pneumonitis. Improving left effusion with associated retrocardiac airspace disease. Pleural-parenchymal scarring or effusion in the right lung base, stable.      Xr Chest Portable    Result Date: 4/24/2019  EXAMINATION: SINGLE XRAY VIEW OF THE CHEST 4/24/2019 6:05 am COMPARISON: Chest radiograph dated 04/22/2019 HISTORY: [id number] PROVIDED HISTORY: Acute respiratory failure, pleural effusion TECHNOLOGIST PROVIDED HISTORY: Acute respiratory failure, pleural effusion Ordering Physician Provided Reason for Exam: pleural effusion, respiratory failure. Acuity: Acute Type of Exam: Initial FINDINGS: Heart size is normal.  Dual-chamber pacemaker placed via left subclavian approach. Right internal jugular central line has tip in distal superior vena cava. Interval removal of nasogastric tube. No interval change of infiltrate and atelectasis at the left lung base. Stable mild infiltrate in the left upper lobe. Mild interval improvement of infiltrate at the right lung base. Stable small bilateral pleural effusions, left larger than right. Mild CHF, stable. 1.  No interval change of infiltrate and atelectasis at the left lung base. Stable mild infiltrate in the left upper lobe. 2.  Mild interval improvement of infiltrate at the right lung base. 3.  Stable small bilateral pleural effusions, left larger than right. 4.  Mild CHF, stable. Xr Chest Portable    Result Date: 4/22/2019  EXAMINATION: SINGLE XRAY VIEW OF THE CHEST 4/22/2019 6:44 am COMPARISON: April 21, 2019. HISTORY: ORDERING SYSTEM PROVIDED HISTORY: Hypoxia and CHF TECHNOLOGIST PROVIDED HISTORY: Hypoxia and CHF Ordering Physician Provided Reason for Exam: hx of hypoxia/CHF Acuity: Acute Type of Exam: Initial FINDINGS: Right IJ central venous catheter appears in unchanged position. Nasogastric tube courses below the diaphragm, with tip not imaged. Left chest wall pacemaker device projects in unchanged position. Cardiac and mediastinal contours are enlarged but unchanged. Unchanged bilateral pleural effusions and bibasilar pulmonary opacities. Unchanged findings suggestive of congestive heart failure. No evidence of pneumothorax. No new osseous abnormalities. 1. No significant change in findings suggestive of congestive heart failure.  2. Unchanged bilateral pleural effusions right basilar opacity possibly edema or atelectasis. No pneumothorax. Increased opacity left upper chest possibly focal area of atelectasis, new from prior. Advanced ETT from prior exam, now 3.1 cm from ron. Increased opacity left upper chest suspicious for atelectasis. Additional supporting devices are stable. Xr Chest Portable    Result Date: 4/17/2019  EXAMINATION: SINGLE XRAY VIEW OF THE CHEST 4/17/2019 7:15 am COMPARISON: 16 April 2019 HISTORY: ORDERING SYSTEM PROVIDED HISTORY: ETT placement TECHNOLOGIST PROVIDED HISTORY: ETT placement Ordering Physician Provided Reason for Exam: on vent Acuity: Acute Type of Exam: Initial FINDINGS: AP portable view of the chest time stamped at 613 hours demonstrates overlying cardiac monitoring electrodes. A right internal jugular catheter terminates in the superior vena cava. Endotracheal tube is somewhat high [place] terminating 6.4 cm above the ron. Intestinal tube extends beyond the body of the stomach, tip not included on the exam.  Bipolar pacemaker enters from the left with intact leads in appropriate positions. Heart size is normal.  Left pleural effusion is noted there is continued volume loss in the left hemithorax with stable mild vascular congestion, perihilar opacities, and faint opacity in the right mid and lower lung field. Underlying COPD is noted. Osseous structures are stable. There is little change from prior study. Findings of COPD, mild central vascular congestion, left effusion, and scattered opacities favoring interstitial edema with multifocal pneumonitis not excluded. Tubes and lines as above. However, endotracheal tube is high riding. The findings were sent to the Radiology Results [address] at 7:58 am on 4/17/2019to be communicated to a licensed caregiver. RECOMMENDATION: Suggest advancement of endotracheal tube.      Xr Chest Portable    Result Date: 4/16/2019  EXAMINATION: SINGLE XRAY VIEW OF THE CHEST 4/16/2019 6:54 am COMPARISON: 04/15/2019, 610 hours HISTORY: ORDERING SYSTEM PROVIDED HISTORY: ETT placement TECHNOLOGIST PROVIDED HISTORY: ETT placement Ordering Physician Provided Reason for Exam: on vent Acuity: Acute Type of Exam: Initial 47-year-old female on ventilator; check endotracheal tube placement FINDINGS: Portable AP upright view of the chest. Endotracheal tube distal tip overlying the mid trachea approximately 4.1 cm above the level of the ron. Enteric tube traverses the GE junction with distal tip excluded from the field of view. Left subclavian approach cardiac pacemaker device distal lead tips relatively stable in position. Right internal jugular approach central venous catheter distal tip overlying the high right atrium, stable. Cardiac monitor leads overlie the chest. Atherosclerotic calcification of the thoracic aorta. Slight stable volume loss of the left hemithorax. No pneumothorax. No free air. Dense retrocardiac/left basilar airspace consolidation and small left-sided pleural effusion. Stable mild focal opacity at the right mid lung zone. Underlying COPD. Stable mild pulmonary vascular congestion and left-sided predominant parahilar opacity. Visualized osseous structures remain unchanged. 1. Stable multifocal airspace disease as detailed above with dense retrocardiac/left basilar airspace consolidation and small left-sided pleural effusion. Mild pulmonary vascular congestion. Findings may represent edema or multifocal pneumonia. 2. Underlying COPD. 3. Tubes and line as detailed above.      Xr Chest Portable    Result Date: 4/15/2019  EXAMINATION: SINGLE XRAY VIEW OF THE CHEST 4/15/2019 6:47 am COMPARISON: 14 April 2019 HISTORY: ORDERING SYSTEM PROVIDED HISTORY: ETT placement TECHNOLOGIST PROVIDED HISTORY: ETT placement Ordering Physician Provided Reason for Exam: on vent Acuity: Acute Type of Exam: Initial FINDINGS: AP portable view of the chest time stamped at 612 hours demonstrates overlying cardiac monitoring electrodes. Endotracheal tube terminates 4 cm above the ron. Bipolar pacemaker enters from the left with intact leads in appropriate positions. Intestinal tube extends beyond the fundus of the stomach, tip not included. Right internal jugular catheter terminates at the cavoatrial junction. Heart size is normal.  Aortic arch is calcified. There is interval improvement in vascular congestion with resolution of perihilar opacities. Some bibasilar opacities remain. No extrapleural air is noted. Osseous structures are stable. Interval improvement in vascular congestion and bilateral opacities consistent with resolving pulmonary edema. Tubes and lines as above. Xr Chest Portable    Result Date: 4/14/2019  EXAMINATION: SINGLE XRAY VIEW OF THE CHEST 4/14/2019 7:57 am COMPARISON: Portable chest 04/13/2019. HISTORY: ORDERING SYSTEM PROVIDED HISTORY: Intubation TECHNOLOGIST PROVIDED HISTORY: Intubation Ordering Physician Provided Reason for Exam: intubation Acuity: Acute Type of Exam: Initial Additional signs and symptoms: intubation FINDINGS: Endotracheal tube terminates over the midthoracic trachea. Dual-chamber pacemaker leads appear unchanged in position. Right IJ approach central venous catheter unchanged in position. Heart size not substantially changed. Perihilar and basilar opacities further increased. Left pleural effusion increased in size. Findings may reflect pulmonary edema, progressed from yesterday's exam.  Left pleural effusion increased in size.      Vl Upper Extremity Bilateral Venous Duplex    Result Date: 4/22/2019    [facility]  Vascular Upper Extremities Veins Procedure   Patient Name   Rajeev Crowley     Date of Study           04/22/2019                 Akua Jaquez   Date of Birth  1950  Gender                  Female   Age            77 year(s)  Race                       Room Number    2002        Height:                 59.84 inch, 152 cm   Corporate ID # [id number]    Weight:                 102 pounds, 46.3 kg   Patient Acct # [de-identified]   BSA:        1.4 m^2     BMI:       20.03 kg/m^2   MR #           [id number]      Sonographer             Roderick Mario   Accession #    [id number]   Interpreting Physician  Geoffrey Steele   Referring                  Referring Physician     Wendy Allan *  Nurse  Practitioner  Procedure Type of Study:   Veins: Upper Extremities Veins, Venous Scan Upper Bilateral.  Indications for Study:Swelling. Patient Status: In Patient. Technical Quality:Limited visualization. Limitation reason:Line placements. Conclusions   Summary   No evidence of superficial or deep venous thrombosis in both upper  extremities. Signature   ----------------------------------------------------------------  Electronically signed by Roderick Mario(Sonographer) on  04/22/2019 11:46 AM  ----------------------------------------------------------------   ----------------------------------------------------------------  Electronically signed by Seda Oliveira(Interpreting  physician) on 04/22/2019 09:31 PM  ----------------------------------------------------------------  Findings:   Right Impression:                  Left Impression:  Internal jugular vein not          The internal jugular, subclavian,  visualized due to line placement. axillary, brachial, radial, and ulnar                                     veins demonstrate normal  Subclavian, axillary, brachial,    compressibility with phasic Doppler  radial, and ulnar veins            signals. demonstrate normal compressibility  with phasic Doppler signals. Normal compressibility of the cephalic                                     vein. Normal compressibility of the  cephalic vein. Normal compressibility of the basilic                                     vein. Normal compressibility of the  basilic vein.   Velocities are measured in cm/s ; Diameters are +------------------------------------+----------+---------------+----------+ ! Basilic at AF                       ! Yes       ! Yes            ! None      ! +------------------------------------+----------+---------------+----------+ ! Basilic at [address]                       ! Yes       ! Yes            ! None      ! +------------------------------------+----------+---------------+----------+ ! Cephalic at UA                      ! Yes       ! Yes            ! None      ! +------------------------------------+----------+---------------+----------+ ! Cephalic at AF                      ! Yes       ! Yes            ! None      ! +------------------------------------+----------+---------------+----------+ ! Cephalic at [address]                      ! Yes       ! Yes            ! None      ! +------------------------------------+----------+---------------+----------+ Doppler Measurements +-------------------------+-----------------------+------------------------+ ! Location                 ! Signal                 !Reflux                  ! +-------------------------+-----------------------+------------------------+ ! SCV                      ! Phasic                 ! No                      ! +-------------------------+-----------------------+------------------------+ ! Axillary                 ! Phasic                 ! No                      ! +-------------------------+-----------------------+------------------------+ ! Brachial                 !Phasic                 ! No                      ! +-------------------------+-----------------------+------------------------+ Left UE Vein Measurements 2D Measurements +------------------------------------+----------+---------------+----------+ ! Location                            ! Visualized! Compressibility! Thrombosis! +------------------------------------+----------+---------------+----------+ ! Prox IJV                            ! Yes       ! Yes            ! None      ! +------------------------------------+----------+---------------+----------+ ! Dist IJV                            ! Yes       ! Yes            ! None      ! +------------------------------------+----------+---------------+----------+ ! Prox SCV                            ! Yes       ! Yes            ! None      ! +------------------------------------+----------+---------------+----------+ ! Dist SCV                            ! Yes       ! Yes            ! None      ! +------------------------------------+----------+---------------+----------+ ! Prox Axillary                       ! Yes       ! Yes            ! None      ! +------------------------------------+----------+---------------+----------+ ! Dist Axillary                       ! Yes       ! Yes            ! None      ! +------------------------------------+----------+---------------+----------+ ! Prox Brachial                       !Yes       ! Yes            ! None      ! +------------------------------------+----------+---------------+----------+ ! Dist Brachial                       !Yes       ! Yes            ! None      ! +------------------------------------+----------+---------------+----------+ ! Prox Radial                         !Yes       ! Yes            ! None      ! +------------------------------------+----------+---------------+----------+ ! Dist Radial                         !Yes       ! Yes            ! None      ! +------------------------------------+----------+---------------+----------+ ! Prox Ulnar                          ! Yes       ! Yes            ! None      ! +------------------------------------+----------+---------------+----------+ ! Dist Ulnar                          ! Yes       ! Yes            ! None      ! +------------------------------------+----------+---------------+----------+ ! Basilic at                        ! Yes       ! Yes            ! None      ! +------------------------------------+----------+---------------+----------+ ! Cephalic at UA !Yes       !Yes            ! None      ! +------------------------------------+----------+---------------+----------+ ! Cephalic at AF                      ! Yes       ! Yes            ! None      ! +------------------------------------+----------+---------------+----------+ Doppler Measurements +-------------------------+-----------------------+------------------------+ ! Location                 ! Signal                 !Reflux                  ! +-------------------------+-----------------------+------------------------+ ! IJV                      ! Phasic                 !                        ! +-------------------------+-----------------------+------------------------+ ! SCV                      ! Phasic                 !                        ! +-------------------------+-----------------------+------------------------+ ! Axillary                 ! Phasic                 !                        ! +-------------------------+-----------------------+------------------------+ ! Brachial                 !Phasic                 !                        ! +-------------------------+-----------------------+------------------------+    Vl Dup Lower Extremity Venous Bilateral    Result Date: 5/7/2019    [facility]  Vascular Lower Extremities DVT Study Procedure   Patient Name   [facility]     Date of Study           05/07/2019                 Vidya Montalvo   Date of Birth  1950  Gender                  Female   Age            77 year(s)  Race                       Room Number    2123        Height:                 59.84 inch, 152 cm   Corporate ID # [id number]    Weight:                 102 pounds, 46.3 kg   Patient Acct # [de-identified]   BSA:        1.4 m^2     BMI:      20.03 kg/m^2   MR #           [id number]      Sonographer             Susana Okeefe RVT   Accession #    [id number]   Interpreting Physician  Sugey Allan   Referring                  Referring Physician     Debbie Mclain MD Nurse  Practitioner  Procedure Type of Study:   Veins: Lower Extremities DVT Study, Venous Scan Lower Bilateral.  Indications for Study:Pain and swelling. Patient Status: In Patient. Technical Quality:Limited visualization. Limitation reason:patient movement. Conclusions   Summary   No evidence of superficial or deep venous thrombosis in both lower  extremities. Technically limited visualization. Signature   ----------------------------------------------------------------  Electronically signed by Vale Castanon RVT(Sonographer) on  05/07/2019 01:22 PM  ----------------------------------------------------------------   ----------------------------------------------------------------  Electronically signed by Danish Oliveira(Interpreting  physician) on 05/07/2019 06:45 PM  ----------------------------------------------------------------  Findings:   Right Impression:                    Left Impression:  The common femoral, femoral,         The common femoral, femoral,  popliteal and tibial veins           popliteal and tibial veins  demonstrate normal compressibility   demonstrate normal compressibility  and augmentation. and augmentation. Limited visualization of the calf    Limited visualization of the calf  veins. veins. Normal compressibility of the great  Normal compressibility of the great  saphenous vein. saphenous vein. Normal compressibility of the small  Normal compressibility of the small  saphenous vein. saphenous vein. Velocities are measured in cm/s ; Diameters are measured in cm Right Lower Extremities DVT Study Measurements Right 2D Measurements +------------------------------------+----------+---------------+----------+ ! Location                            ! Visualized! Compressibility! Thrombosis! +------------------------------------+----------+---------------+----------+ ! Common Femoral !Yes       !Yes            ! None      ! +------------------------------------+----------+---------------+----------+ ! Prox Femoral                        !Yes       ! Yes            ! None      ! +------------------------------------+----------+---------------+----------+ ! Mid Femoral                         !Yes       ! Yes            ! None      ! +------------------------------------+----------+---------------+----------+ ! Dist Femoral                        !Yes       ! Yes            ! None      ! +------------------------------------+----------+---------------+----------+ ! Deep Femoral                        !Yes       ! Yes            ! None      ! +------------------------------------+----------+---------------+----------+ ! Popliteal                           !Yes       ! Yes            ! None      ! +------------------------------------+----------+---------------+----------+ ! Sapheno Femoral Junction            ! Yes       ! Yes            ! None      ! +------------------------------------+----------+---------------+----------+ ! PTV                                 ! Partial   !Yes            ! None      ! +------------------------------------+----------+---------------+----------+ ! Peroneal                            !Partial   !Yes            ! None      ! +------------------------------------+----------+---------------+----------+ ! Gastroc                             ! Yes       ! Yes            ! None      ! +------------------------------------+----------+---------------+----------+ ! GSV Thigh                           ! Yes       ! Yes            ! None      ! +------------------------------------+----------+---------------+----------+ ! GSV Knee                            ! Yes       ! Yes            ! None      ! +------------------------------------+----------+---------------+----------+ ! GSV Ankle                           ! Yes       ! Yes            ! None      ! +------------------------------------+----------+---------------+----------+ ! SSV                                 ! Partial   !Yes            ! None      ! +------------------------------------+----------+---------------+----------+ Left Lower Extremities DVT Study Measurements Left 2D Measurements +------------------------------------+----------+---------------+----------+ ! Location                            ! Visualized! Compressibility! Thrombosis! +------------------------------------+----------+---------------+----------+ ! Common Femoral                      !Yes       ! Yes            ! None      ! +------------------------------------+----------+---------------+----------+ ! Prox Femoral                        !Yes       ! Yes            ! None      ! +------------------------------------+----------+---------------+----------+ ! Mid Femoral                         !Yes       ! Yes            ! None      ! +------------------------------------+----------+---------------+----------+ ! Dist Femoral                        !Yes       ! Yes            ! None      ! +------------------------------------+----------+---------------+----------+ ! Deep Femoral                        !Yes       ! Yes            ! None      ! +------------------------------------+----------+---------------+----------+ ! Popliteal                           !Yes       ! Yes            ! None      ! +------------------------------------+----------+---------------+----------+ ! Sapheno Femoral Junction            ! Yes       ! Yes            ! None      ! +------------------------------------+----------+---------------+----------+ ! PTV                                 ! Partial   !Yes            ! None      ! +------------------------------------+----------+---------------+----------+ ! Peroneal                            !Partial   !Yes            ! None      ! +------------------------------------+----------+---------------+----------+ ! Gastroc !Yes       !Yes            ! None      ! +------------------------------------+----------+---------------+----------+ ! GSV Thigh                           ! Yes       ! Yes            ! None      ! +------------------------------------+----------+---------------+----------+ ! GSV Knee                            ! Yes       ! Yes            ! None      ! +------------------------------------+----------+---------------+----------+ ! GSV Ankle                           ! Yes       ! Yes            ! None      ! +------------------------------------+----------+---------------+----------+ ! SSV                                 ! Partial   !Yes            ! None      ! +------------------------------------+----------+---------------+----------+    Ir Cholecystostomy Percutaneous Complete    Result Date: 4/22/2019  PROCEDURE: ULTRASOUND and fluoroscopic GUIDED CHOLECYSTOSTOMY TUBE PLACEMENT April 22, 2019 HISTORY: ORDERING SYSTEM PROVIDED HISTORY: acute cholecystitis TECHNOLOGIST PROVIDED HISTORY: acute cholecystitis SEDATION: 75 mcg IV fentanyl for pain TECHNIQUE: Informed consent was obtained after a detailed explanation of the procedure including risks, benefits, and alternatives. Universal protocol was followed. A suitable skin site was prepped and draped in sterile fashion following ultrasound localization. An 18 gauge needle was advanced under ultrasound guidance into the gallbladder via transhepatic route and a 0.035 guidewire was used to place a 8 [place] cholecystostomy tube under fluoroscopic guidance. The catheter was sutured to the skin and the patient tolerated the procedure well. Thick bilious material was sent for laboratory analysis. The catheter was attached to gravity drainage. FINDINGS: Ultrasound image demonstrates distended gallbladder. Bilious fluid was sent for culture. Successful placement of transhepatic 8 Upper sorbian percutaneous cholecystostomy tube.      Nm Hepatobiliary Scan W Ejection Fraction    Result Date: 4/20/2019  EXAMINATION: NUCLEAR MEDICINE HEPATOBILIARY SCINTIGRAPHY (HIDA SCAN). 4/20/2019 2:15 pm TECHNIQUE: Approximately 5.6 [id number] Mebrofenin (Choletec) was administered IV. Then, dynamic images of the abdomen were obtained in the anterior projection for 60 mins. A right lateral view was also obtained at 60 mins. Delayed images up to 4 hours were obtained. COMPARISON: Ultrasound 04/19/2019 HISTORY: ORDERING SYSTEM PROVIDED HISTORY: CHOLECYSTITIS TECHNOLOGIST PROVIDED HISTORY: Ordering Physician Provided Reason for Exam: Cholecystitis Acuity: Unknown Type of Exam: Unknown FINDINGS: Prompt, homogenous uptake by the liver is noted with normal appearance of radiotracer excretion into the biliary system. Clearance of bloodpool activity appears appropriate. Normal small bowel activity is seen. Prominent activity within the common bile duct. The gallbladder is not visualized by the end of the exam.     Absent filling of the gallbladder consistent with acute cholecystitis. Fl Small Bowel Follow Through Only    Result Date: 5/10/2019  EXAMINATION: SMALL BOWEL FOLLOW THROUGH SERIES 5/9/2019 TECHNIQUE: Small bowel follow through series was performed with overhead images. FLUOROSCOPY DOSE AND TYPE OR TIME AND EXPOSURES: No fluoroscopic images obtained. COMPARISON: CT abdomen pelvis 05/05/2019 HISTORY: ORDERING SYSTEM PROVIDED HISTORY: internal hernia TECHNOLOGIST PROVIDED HISTORY: Water soluble contrast only. Study to be done ONLY if NGT is placed by IR internal hernia FINDINGS:  image demonstrates NG tube overlying the left side of the abdomen within the stomach. Surgical drain overlies the right side of the abdomen. There is a nonspecific bowel gas pattern with mild dilated loops of bowel in lower abdomen. Initial images demonstrate filling of the stomach. At 1 hour and 45 minutes no significant contrast is noted in the small bowel.  The 4-1/2 hour image demonstrates contrast within loops of bowel within the mid and lower abdomen and pelvis. There appears to be contrast extending to the colon in the right side of the abdomen. Contrast is noted within the pelvis which may be within the bladder or rectum. Significant delay in emptying of the stomach with persistent contrast noted at the 6 hour concha. There is contrast extending into the bowel which appears to be in the colon. Subtle findings are limited. Consider follow-up radiograph of the abdomen in 6-8 hours. Ir Place Ng Tube By Dr Faustino Clark    Result Date: 5/9/2019  PROCEDURE: XR PLACE NASOGASTRIC TUBE PHYS 5/9/2019 HISTORY: ORDERING SYSTEM PROVIDED HISTORY: ileus TECHNOLOGIST PROVIDED HISTORY: ileus Ordering Physician Provided Reason for Exam: ILEUS Acuity: Unknown Type of Exam: Unknown CONTRAST: None SEDATION: None FLUOROSCOPY DOSE AND TYPE OR TIME AND EXPOSURES: 21 seconds; D AP 46 DESCRIPTION OF PROCEDURE AND FINDINGS: This procedure was performed by Dr. Nita Swanson. Under intermittent fluoroscopic guidance a 12 Tamazight nasogastric tube was placed through the right nostril and directed under fluoroscopy into the stomach. A small amount of air was injected confirming the tip location in the stomach. Partially visualized cholecystostomy tube and pacer wires. Tube was secured in place to the patient's nose with an adhesive dressing. 12 Tamazight nasogastric tube placed successfully with its tip in the stomach. Physical Examination:        Physical Exam   Constitutional: She is oriented to person, place, and time. She appears well-developed. No distress. Cachectic, lying in bed    HENT:   Oropharynx dry    Eyes: Conjunctivae are normal.   Neck: Neck supple. Cardiovascular: Regular rhythm, normal heart sounds and intact distal pulses. Pulmonary/Chest: Effort normal and breath sounds normal. No respiratory distress. She exhibits no tenderness. Increased resp rate    Abdominal: Soft. Bowel sounds are normal. There is no tenderness. Musculoskeletal: She exhibits no edema or tenderness. Neurological: She is alert and oriented to person, place, and time. Left sided hemiparesis  - chronic      Skin: Skin is warm and dry. Vitals reviewed. Assessment:        Primary Problem  GI bleed    Active Hospital Problems    Diagnosis Date Noted    Small bowel obstruction ([facility] [address] [K56.609]     Anxiety disorder [F41.9]     Noncompliance [Z91.19]     Anemia [D64.9]     GI bleed [K92.2] 05/03/2019    Hemorrhage of rectum and anus [K62.5]     Centrilobular emphysema ([facility] [address] [J43.2] 04/30/2019    CRP elevated [R79.82]     Elevated procalcitonin [R79.89]     Bandemia [D72.825]     Cholecystitis [K81.9]     Abdominal distention [R14.0]     Elevated CEA [R97.0]     Elevated CA 19-9 level [R97.8]     Urinary retention [R33.9]     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]     Septic shock ([facility] [address] [A41.9, R65.21]     Leukemoid reaction [D72.823]     Aspiration pneumonia of right lower lobe due to vomit ([facility] [address] [J69.0]     MRSA carrier [Z22.322]     Sepsis due to urinary tract infection ([facility] [address] [A41.9, N39.0] 04/11/2019    Cystitis [N30.90] 04/11/2019       Plan:        Acute Respiratory Failure r/o sepsis 2/2 aspiration pneumonia  - No intubation per code status  - Code status changed to [facility], meeting with hospice 5/13  - Clinically improved  - ID following - Vanco d/c'd, cont.  Meropenem  - WBC 15.1 (from 14.9)  - Con. Dulera, Xopenex, Symbicort, Atrovent  - Blood culture NGTD   - Labs d/c'd at this time     Abdominal pain, Ileus vs SBO  - NG tube removed after code status change  - Tolerating PO - CLD     Anemia, likely due to GI Bleed  - Patient refused EGD/colonoscopy   - Hgb 9.3   - Transfuse Hgb <7.0     Sinus Tachycardia  - Hx of CBA, bradycardia with pacemaker   - Lopressor   - Lopressor PRN   - Monitoring      Acute cholecystitis   - Refused treatment, code status changed     Gastroparesis   - Protonix  - Reglan  - Daily EKG  - Considering systemic autonomic dysfunction as overlying diagnosis (gastroparesis, - neurogenic bladder, hypotension/fluctuating BPs)      Maintenance  - Heparin 5000u q12h   - TPN per dietary, Phos 1.4  - Continue home meds     Dispo  - PT/OT  - Social work dc planning         Katharina Mccray MD  5/13/2019  9:27 AM   Attending Physician Statement  Patient seen and examined  I have discussed the care of the patient, including pertinent history and exam findings,  with the resident. I have reviewed the key elements of all parts of the encounter with the resident. I agree with the assessment, plan and orders as documented by the resident.     Erick Kapoor

## 2019-05-13 NOTE — PROGRESS NOTES
SW spoke to pt and her son Justin Florentino. Pt is now agreeable to have surgery. MD also spoke to pt who verbally agreed to surgery in front of Dr. Katelynn March. SW is now having Kira Miranda follow pt for rehab after surgery. Pt and son are agreeable. (RODGER did speak to Ana Cristina Jane from [employer] who said pt's [facility] case is still under appeal).

## 2019-05-13 NOTE — CARE COORDINATION
PT. IS A+O. PT. SON DONG STATES \"THIS IS THE MOST LUCID SHE HAS EVER BEEN\" WRITER, NORA RAMIREZ LSW, PT. AND PT BIOLOGICAL SON-NOT POA (DONG) DISCUSSED PT. DECISIONS. SHE IS NOW AGREEABLE TO SURGERY AND DOES NOT WANT HOSPICE AT THIS TIME. DISCUSSED WITH PRIMARY NURSE SHE WILL NOTIFY DR. Venu Zaldivar.  Electronically signed by Donna Thorne RN on 5/13/2019 at 3:13 PM

## 2019-05-13 NOTE — FLOWSHEET NOTE
05/12/19 0950   Encounter Summary   Services provided to: Patient   Referral/Consult From: Ольга   Continue Visiting   (5/12/19)   Volunteer Visit Yes   Complexity of Encounter Low   Length of Encounter 15 minutes   Routine   Type Follow up   Intervention Prayer

## 2019-05-13 NOTE — PROGRESS NOTES
[facility]    OCCUPATIONAL THERAPY MISSED TREATMENT NOTE   INPATIENT   Date: 19  Patient Name: Suad Valentino       Room: 6-  MRN: [id number]   Account #: [de-identified]    : 1950  (77 y.o.)  Gender: female   Referring Practitioner: Fili Mcdonald MD  Diagnosis: Sepsis due to UTI             REASON FOR MISSED TREATMENT:  Hold treatment per nursing request   -   Donaldo Conway RN stated that pt will be leaving for Hospice this date        Laverne POWELL/KORINA  This patient  Suad Valentino  was seen by the Occupational Therapy Student identified above, including any treatment and documentation activity.   The above documentation completed by KORINA Student  was reviewed and accepted by the Supervising Clinical Instructor   Electronically signed by Jl CONSTANTINO on 19 at 11:40 AM  .

## 2019-05-14 LAB
ABSOLUTE BANDS #: 0.62 K/UL (ref 0–1)
ABSOLUTE EOS #: 0 K/UL (ref 0–0.4)
ABSOLUTE IMMATURE GRANULOCYTE: ABNORMAL K/UL (ref 0–0.3)
ABSOLUTE LYMPH #: 0.87 K/UL (ref 1–4.8)
ABSOLUTE MONO #: 0.99 K/UL (ref 0.1–1.3)
BANDS: 5 % (ref 0–10)
BASOPHILS # BLD: 0 % (ref 0–2)
BASOPHILS ABSOLUTE: 0 K/UL (ref 0–0.2)
CULTURE: ABNORMAL
DIFFERENTIAL TYPE: ABNORMAL
EOSINOPHILS RELATIVE PERCENT: 0 % (ref 0–4)
GLUCOSE BLD-MCNC: 81 MG/DL (ref 65–105)
GLUCOSE BLD-MCNC: 82 MG/DL (ref 65–105)
GLUCOSE BLD-MCNC: 93 MG/DL (ref 65–105)
GLUCOSE BLD-MCNC: 99 MG/DL (ref 65–105)
HCT VFR BLD CALC: 29.5 % (ref 36–46)
HEMOGLOBIN: 9.4 G/DL (ref 12–16)
IMMATURE GRANULOCYTES: ABNORMAL %
LYMPHOCYTES # BLD: 7 % (ref 24–44)
Lab: ABNORMAL
MCH RBC QN AUTO: 27.3 PG (ref 26–34)
MCHC RBC AUTO-ENTMCNC: 32 G/DL (ref 31–37)
MCV RBC AUTO: 85.4 FL (ref 80–100)
METAMYELOCYTES ABSOLUTE COUNT: 0.12 K/UL
METAMYELOCYTES: 1 %
MONOCYTES # BLD: 8 % (ref 1–7)
MORPHOLOGY: ABNORMAL
MORPHOLOGY: ABNORMAL
NRBC AUTOMATED: ABNORMAL PER 100 WBC
PDW BLD-RTO: 16.5 % (ref 11.5–14.9)
PLATELET # BLD: 213 K/UL (ref 150–450)
PLATELET ESTIMATE: ABNORMAL
PMV BLD AUTO: 8.8 FL (ref 6–12)
RBC # BLD: 3.45 M/UL (ref 4–5.2)
RBC # BLD: ABNORMAL 10*6/UL
SEG NEUTROPHILS: 79 % (ref 36–66)
SEGMENTED NEUTROPHILS ABSOLUTE COUNT: 9.8 K/UL (ref 1.3–9.1)
SPECIMEN DESCRIPTION: ABNORMAL
WBC # BLD: 12.4 K/UL (ref 3.5–11)
WBC # BLD: ABNORMAL 10*3/UL

## 2019-05-14 PROCEDURE — 94761 N-INVAS EAR/PLS OXIMETRY MLT: CPT

## 2019-05-14 PROCEDURE — 6360000002 HC RX W HCPCS: Performed by: INTERNAL MEDICINE

## 2019-05-14 PROCEDURE — 94640 AIRWAY INHALATION TREATMENT: CPT

## 2019-05-14 PROCEDURE — 1200000000 HC SEMI PRIVATE

## 2019-05-14 PROCEDURE — 6370000000 HC RX 637 (ALT 250 FOR IP): Performed by: INTERNAL MEDICINE

## 2019-05-14 PROCEDURE — 2500000003 HC RX 250 WO HCPCS: Performed by: STUDENT IN AN ORGANIZED HEALTH CARE EDUCATION/TRAINING PROGRAM

## 2019-05-14 PROCEDURE — 6360000002 HC RX W HCPCS: Performed by: STUDENT IN AN ORGANIZED HEALTH CARE EDUCATION/TRAINING PROGRAM

## 2019-05-14 PROCEDURE — 2700000000 HC OXYGEN THERAPY PER DAY

## 2019-05-14 PROCEDURE — 82947 ASSAY GLUCOSE BLOOD QUANT: CPT

## 2019-05-14 PROCEDURE — 99999 PR OFFICE/OUTPT VISIT,PROCEDURE ONLY: CPT | Performed by: INTERNAL MEDICINE

## 2019-05-14 PROCEDURE — 2580000003 HC RX 258: Performed by: INTERNAL MEDICINE

## 2019-05-14 PROCEDURE — 2500000003 HC RX 250 WO HCPCS: Performed by: INTERNAL MEDICINE

## 2019-05-14 PROCEDURE — 2580000003 HC RX 258: Performed by: STUDENT IN AN ORGANIZED HEALTH CARE EDUCATION/TRAINING PROGRAM

## 2019-05-14 PROCEDURE — 85025 COMPLETE CBC W/AUTO DIFF WBC: CPT

## 2019-05-14 PROCEDURE — 97110 THERAPEUTIC EXERCISES: CPT

## 2019-05-14 PROCEDURE — 36592 COLLECT BLOOD FROM PICC: CPT

## 2019-05-14 PROCEDURE — 36415 COLL VENOUS BLD VENIPUNCTURE: CPT

## 2019-05-14 PROCEDURE — 99232 SBSQ HOSP IP/OBS MODERATE 35: CPT | Performed by: INTERNAL MEDICINE

## 2019-05-14 PROCEDURE — C9113 INJ PANTOPRAZOLE SODIUM, VIA: HCPCS | Performed by: STUDENT IN AN ORGANIZED HEALTH CARE EDUCATION/TRAINING PROGRAM

## 2019-05-14 RX ADMIN — ONDANSETRON 4 MG: 2 INJECTION INTRAMUSCULAR; INTRAVENOUS at 10:51

## 2019-05-14 RX ADMIN — LORAZEPAM 0.5 MG: 2 INJECTION INTRAMUSCULAR; INTRAVENOUS at 21:58

## 2019-05-14 RX ADMIN — Medication 2 PUFF: at 20:34

## 2019-05-14 RX ADMIN — LEVALBUTEROL 1.25 MG: 1.25 SOLUTION, CONCENTRATE RESPIRATORY (INHALATION) at 14:30

## 2019-05-14 RX ADMIN — POTASSIUM CHLORIDE, DEXTROSE MONOHYDRATE AND SODIUM CHLORIDE: 150; 5; 450 INJECTION, SOLUTION INTRAVENOUS at 00:03

## 2019-05-14 RX ADMIN — LORAZEPAM 0.5 MG: 2 INJECTION INTRAMUSCULAR; INTRAVENOUS at 13:51

## 2019-05-14 RX ADMIN — MEROPENEM 1 G: 1 INJECTION, POWDER, FOR SOLUTION INTRAVENOUS at 03:00

## 2019-05-14 RX ADMIN — POTASSIUM CHLORIDE, DEXTROSE MONOHYDRATE AND SODIUM CHLORIDE: 150; 5; 450 INJECTION, SOLUTION INTRAVENOUS at 13:41

## 2019-05-14 RX ADMIN — LEVALBUTEROL 1.25 MG: 1.25 SOLUTION, CONCENTRATE RESPIRATORY (INHALATION) at 11:02

## 2019-05-14 RX ADMIN — IPRATROPIUM BROMIDE 0.5 MG: 0.5 SOLUTION RESPIRATORY (INHALATION) at 07:01

## 2019-05-14 RX ADMIN — MEROPENEM 1 G: 1 INJECTION, POWDER, FOR SOLUTION INTRAVENOUS at 10:55

## 2019-05-14 RX ADMIN — LEVALBUTEROL 1.25 MG: 1.25 SOLUTION, CONCENTRATE RESPIRATORY (INHALATION) at 20:05

## 2019-05-14 RX ADMIN — ONDANSETRON 4 MG: 2 INJECTION INTRAMUSCULAR; INTRAVENOUS at 03:08

## 2019-05-14 RX ADMIN — PANTOPRAZOLE SODIUM 40 MG: 40 INJECTION, POWDER, FOR SOLUTION INTRAVENOUS at 11:36

## 2019-05-14 RX ADMIN — METOPROLOL TARTRATE 5 MG: 5 INJECTION, SOLUTION INTRAVENOUS at 20:34

## 2019-05-14 RX ADMIN — LORAZEPAM 0.5 MG: 2 INJECTION INTRAMUSCULAR; INTRAVENOUS at 03:08

## 2019-05-14 RX ADMIN — Medication 10 ML: at 11:36

## 2019-05-14 RX ADMIN — IPRATROPIUM BROMIDE 0.5 MG: 0.5 SOLUTION RESPIRATORY (INHALATION) at 20:05

## 2019-05-14 RX ADMIN — LEVALBUTEROL 1.25 MG: 1.25 SOLUTION, CONCENTRATE RESPIRATORY (INHALATION) at 07:01

## 2019-05-14 RX ADMIN — LEVALBUTEROL 1.25 MG: 1.25 SOLUTION, CONCENTRATE RESPIRATORY (INHALATION) at 23:28

## 2019-05-14 RX ADMIN — IPRATROPIUM BROMIDE 0.5 MG: 0.5 SOLUTION RESPIRATORY (INHALATION) at 11:02

## 2019-05-14 RX ADMIN — VENLAFAXINE 37.5 MG: 37.5 TABLET ORAL at 20:34

## 2019-05-14 RX ADMIN — Medication 2 PUFF: at 08:15

## 2019-05-14 RX ADMIN — Medication 10 ML: at 20:41

## 2019-05-14 RX ADMIN — IPRATROPIUM BROMIDE 0.5 MG: 0.5 SOLUTION RESPIRATORY (INHALATION) at 14:30

## 2019-05-14 RX ADMIN — MEROPENEM 1 G: 1 INJECTION, POWDER, FOR SOLUTION INTRAVENOUS at 17:32

## 2019-05-14 RX ADMIN — IPRATROPIUM BROMIDE 0.5 MG: 0.5 SOLUTION RESPIRATORY (INHALATION) at 23:28

## 2019-05-14 RX ADMIN — METOPROLOL TARTRATE 5 MG: 5 INJECTION, SOLUTION INTRAVENOUS at 10:55

## 2019-05-14 RX ADMIN — MORPHINE SULFATE 2 MG: 2 INJECTION, SOLUTION INTRAMUSCULAR; INTRAVENOUS at 21:57

## 2019-05-14 ASSESSMENT — PAIN DESCRIPTION - LOCATION
LOCATION: ARM
LOCATION: ABDOMEN

## 2019-05-14 ASSESSMENT — PAIN DESCRIPTION - ORIENTATION
ORIENTATION: RIGHT;LEFT
ORIENTATION: LEFT

## 2019-05-14 ASSESSMENT — ENCOUNTER SYMPTOMS
CONSTIPATION: 0
SHORTNESS OF BREATH: 0
ABDOMINAL PAIN: 0
NAUSEA: 0
VOMITING: 0

## 2019-05-14 ASSESSMENT — PAIN DESCRIPTION - PAIN TYPE
TYPE: ACUTE PAIN
TYPE: ACUTE PAIN;CHRONIC PAIN

## 2019-05-14 ASSESSMENT — PAIN SCALES - GENERAL
PAINLEVEL_OUTOF10: 0
PAINLEVEL_OUTOF10: 10
PAINLEVEL_OUTOF10: 1

## 2019-05-14 NOTE — PROGRESS NOTES
Infectious disease Consult Note      Patient: Clark Ledesma  : 1950  Acct#:  [id number]     Date:  2019    Assessment:   Leukocytosis likely related to aspiration pneumonia . Shock resolved . Anemia/GI bleed followed by GI  Ileus ,possible SBO   Cholecystitis status post cholecystectomy tube  grew Candida non-albicans and one colony of ESBL Klebsiella on culture . finished ABXS course      Ecoli Emphysematous cystitis initially treated     Aspiration pneumonia of right lower lobe initially treated    Yeast growth on sputum culture suspect contamination     MRSA carrier    Urinary retention     Anemia      PCN allergy               Recommendations:     Continue IV meropenem   Plan for diagnostic laparoscopy, possible laparotomy, cholecystectomy tomorrow per surgery . Follow Blood cultures  Follow CBC . Subjective:       History of Present Illness  Patient is a 77 y.o.  female admitted with Sepsis due to urinary tract infection   who is seen in consult for the same . She presented w dysuria and lower abd pain for around a week associated with vomiting ,was found hypotensive with leukocytosis . CT suggested emphysematous cystitis,possible gastric outlet obstruction. CXR showed a right lower infiltrate. High CA-19-9,CEA  Allergy to [facility] w SOB   Urine culture  grew ESCHERICHIA COLI resistant to Ampicillin ,sensitive to all other tested ABXS . Blood cultures negative . Mycoplasma IGM 0.97   YEAST MODERATE GROWTH on sputum culture   S/P EGD   with reported esophagitis. GB US Findings suggesting acute cholecystitis. HIIDA showed absent gallbladder filling. She was extubated on   Status post cholecystectomy tube  by interventional radiology,  cultures on  grew Candida non-albicans and one colony of ESBL Klebsiella. PICC line was placed   Tagged RBC scan  was negative .   CT abdomen  showed no fluid collection ,Bowel obstruction which is suspected to be caused by an internal hernia. NG tube was placed under fluoroscopy 5/9. She had a small bowel follow-through 5/9, developed respiratory failure with hypoxia, tachycardia and tachypnea was transferred to ICU placed on BiPAP, blood pressure on the low side required Levophed,WBC up to 28.5    code status was changed to [facility] on 5/13. Interval history :  No new events ,no fever   Pt is agreeable for surgical interventions      Past Medical History:   Diagnosis Date    Abnormal computed tomography of cervical spine     sclerotic bone appearance     CVA (cerebral vascular accident) ([address]     left  side weakness    GERD (gastroesophageal reflux disease)     Hypertension     Paraproteinemia     Weight loss       Past Surgical History:   Procedure Laterality Date    INTUBATION  4/14/2019         PACEMAKER PLACEMENT  07/2011    Pacemaker is Medtronic Revo (compatible). Leads placed in 1995 are NOT MRI compatible. Placed at [facility] [address]. V's per Dr. George Sharmaon can not have an MRI.  UPPER GASTROINTESTINAL ENDOSCOPY N/A 4/16/2019    EGD ESOPHAGOGASTRODUODENOSCOPY @ BEDSIDE  ICU 2002 performed by Yesica Gonzalez MD at [id number]          Admission Meds  No current facility-administered medications on file prior to encounter. Current Outpatient Medications on File Prior to Encounter   Medication Sig Dispense Refill    oxyCODONE-acetaminophen (PERCOCET) 5-325 MG per tablet Take 1 tablet by mouth every 6 hours as needed for Pain.       senna-docusate (PERICOLACE) 8.6-50 MG per tablet Take 1 tablet by mouth 2 times daily      budesonide-formoterol (SYMBICORT) 160-4.5 MCG/ACT AERO Inhale 2 puffs into the lungs 2 times daily      sucralfate (CARAFATE) 1 GM/10ML suspension Take 1 g by mouth 4 times daily       traZODone (DESYREL) 50 MG tablet Take 50 mg by mouth nightly      tiZANidine (ZANAFLEX) 2 MG tablet Take 2 mg by mouth every 8 hours as needed (left knee)       aspirin 81 MG tablet Take 81 mg by mouth daily      clopidogrel (PLAVIX) 75 MG tablet TAKE 1 TABLET DAILY 30 tablet 2    DOCQLACE 100 MG capsule TAKE 1 CAPSULE BY MOUTH IN THE MORNING & IN THE EVENING -DRINK PLENTYOF WATER WHILE TAKING THIS MEDICINE 30 capsule 1    amLODIPine (NORVASC) 10 MG tablet Take 10 mg by mouth daily.  LORazepam (ATIVAN) 0.5 MG tablet Take 0.5 mg by mouth every 6 hours as needed for Anxiety.  therapeutic multivitamin-minerals (THERAGRAN-M) tablet Take 1 tablet by mouth daily.  omeprazole (PRILOSEC) 20 MG capsule Take 20 mg by mouth daily.  venlafaxine (EFFEXOR) 37.5 MG tablet Take 37.5 mg by mouth 3 times daily.  Mis. Devices [facility]) [address] wheelchair with left fupper extremity support  Dx: stroke with left hemiparesis. 1 each 0           Allergies  Allergies   Allergen Reactions    Penicillins Shortness Of Breath and Rash    Amoxicillin         Social   Social History     Tobacco Use    Smoking status: Current Some Day Smoker     Packs/day: 1.00     Years: 30.00     Pack years: 30.00    Smokeless tobacco: Never Used   Substance Use Topics    Alcohol use: Not Currently     Alcohol/week: 16.8 oz     Types: 28 Glasses of wine per week                History reviewed. No pertinent family history. Review of Systems  As per [place] ,other than above 10 systems reviewed negative . Tolerating antibiotics. Physical Exam  /79   Pulse 86   Temp 98.4 °F (36.9 °C) (Oral)   Resp 18   Ht 5' (1.524 m)   Wt 110 lb 7.2 oz (50.1 kg)   SpO2 99%   BMI 21.57 kg/m²           General Appearance: NAD . Skin: warm and dry, no rash or erythema  Head: normocephalic and atraumatic  Eyes: pupils equal, round  Neck: neck supple and non tender   Pulmonary/Chest: coarse to auscultation bilaterally- with  rhonchi  Cardiovascular: normal rate, regular rhythm, normal S1 and S2, no murmurs.   Abdomen: soft,abdomen tenderness  -distention,  gallbladder drain in place  Extremities: no cyanosis, clubbing   Edema   PICC line in place       Data Review:    Recent Labs     05/12/19  0436  05/12/19  1928 05/13/19  0655 05/14/19  1006   WBC 14.9*  --   --  15.1* 12.4*   HGB 9.0*   < > 9.9* 9.3* 9.4*   HCT 28.5*   < > 31.0* 29.1* 29.5*   MCV 86.9  --   --  87.4 85.4     --   --  224 213    < > = values in this interval not displayed. Recent Labs     05/11/19  2040 05/12/19  0436 05/13/19  0655    140 138   K 4.5 3.3* 3.6*   * 108* 106   CO2 21 23 20   PHOS  --  1.4* 2.6   BUN 38* 33* 25*   CREATININE <0.40* <0.40* <0.40*     No results for input(s): AST, ALT, ALB, BILIDIR, BILITOT, ALKPHOS in the last 72 hours. No results for input(s): LIPASE, AMYLASE in the last 72 hours. Recent Labs     05/12/19  0436 05/13/19  0655   PROTIME 16.6* 15.8*   INR 1.3 1.3       Imaging Studies:                           All appropriate imaging studies and reports reviewed: Yes       Ct Abdomen Pelvis Wo Contrast Additional Contrast? Oral    Result Date: 4/14/2019  EXAMINATION: CT OF THE ABDOMEN AND PELVIS WITHOUT CONTRAST 4/14/2019 7:34 pm TECHNIQUE: CT of the abdomen and pelvis was performed without the administration of intravenous contrast. Multiplanar reformatted images are provided for review. Dose modulation, iterative reconstruction, and/or weight based adjustment of the mA/kV was utilized to reduce the radiation dose to as low as reasonably achievable. COMPARISON: 04/11/2019 HISTORY: ORDERING SYSTEM PROVIDED HISTORY: ABDOMINAL PAIN TECHNOLOGIST PROVIDED HISTORY: Water soluble contrast only please Ordering Physician Provided Reason for Exam: Abdominal pain - Vented patient. Contrast given via nurse through NG tube. Acuity: Unknown Type of Exam: Unknown Relevant Medical/Surgical History: Hx - Sepsis due to urinary tract infection. FINDINGS: Lower Chest: New moderate layering bilateral pleural effusions with bilateral lower lobe atelectasis.  Organs: Limited evaluation due lack of intravenous contrast. junction. The left chest wall pacemaker and leads are stable. The cardiomediastinal silhouette is stable. There is interval increased opacity at the right lung base, may be related to atelectasis versus pneumonia. There is small atelectasis and mild pleural effusion at the left lung base. There is no pneumothorax. There is no acute osseous abnormality. Interval increased opacity at the right lung base, may be related to atelectasis versus pneumonia. Small atelectasis and mild pleural effusion at the left lung, new since the prior study. Xr Femur Left (min 2 Views)    Result Date: 3/27/2019  EXAMINATION: 4 XRAY VIEWS OF THE LEFT FEMUR; 2 XRAY VIEWS OF THE LEFT KNEE; 3 XRAY VIEWS OF THE LEFT TIBIA AND FIBULA 3/27/2019 7:00 pm COMPARISON: Left hip 11/14/2015. HISTORY: ORDERING SYSTEM PROVIDED HISTORY: pain TECHNOLOGIST PROVIDED HISTORY: pain Ordering Physician Provided Reason for Exam: pt twisted wrong, lt knee pain, unable to straighten knee. Acuity: Acute Type of Exam: Initial FINDINGS: Left femur, four views: The bones are diffusely osteopenic. No acute fracture deformity. The hip joint is maintained. The femoral head projects within the acetabulum. No focal soft tissue abnormality is seen. Left knee, two views: The knee is flexed. No acute osseous abnormality. No joint effusion. Joint spaces are not optimally profiled but no significant arthritic changes are apparent. Left tib-fib, three views: There is evidence of a remote healed distal tibia fracture. No acute fracture or dislocation is seen. No focal soft tissue abnormality. No acute osseous abnormality of the left femur. No acute osseous abnormality of the left knee. No acute osseous abnormality of the left tib-fib. Healed remote distal tibia fracture. Marked osteopenia, likely due to disuse.      Xr Knee Left (1-2 Views)    Result Date: 3/27/2019  EXAMINATION: 4 XRAY VIEWS OF THE LEFT FEMUR; 2 XRAY VIEWS OF THE LEFT KNEE; 3 XRAY VIEWS OF THE LEFT TIBIA AND FIBULA 3/27/2019 7:00 pm COMPARISON: Left hip 11/14/2015. HISTORY: ORDERING SYSTEM PROVIDED HISTORY: pain TECHNOLOGIST PROVIDED HISTORY: pain Ordering Physician Provided Reason for Exam: pt twisted wrong, lt knee pain, unable to straighten knee. Acuity: Acute Type of Exam: Initial FINDINGS: Left femur, four views: The bones are diffusely osteopenic. No acute fracture deformity. The hip joint is maintained. The femoral head projects within the acetabulum. No focal soft tissue abnormality is seen. Left knee, two views: The knee is flexed. No acute osseous abnormality. No joint effusion. Joint spaces are not optimally profiled but no significant arthritic changes are apparent. Left tib-fib, three views: There is evidence of a remote healed distal tibia fracture. No acute fracture or dislocation is seen. No focal soft tissue abnormality. No acute osseous abnormality of the left femur. No acute osseous abnormality of the left knee. No acute osseous abnormality of the left tib-fib. Healed remote distal tibia fracture. Marked osteopenia, likely due to disuse. Xr Knee Left (3 Views)    Result Date: 3/30/2019  EXAMINATION: 3 XRAY VIEWS OF THE LEFT KNEE 3/30/2019 10:58 am COMPARISON: March 27, 2018 HISTORY: ORDERING SYSTEM PROVIDED HISTORY: F/u L knee pain after cast TECHNOLOGIST PROVIDED HISTORY: AP, oblique, lateral F/u L knee pain after cast FINDINGS: Marked osteopenia. Interval casting, which degrades fine osseous detail question cortical offset in the lateral femoral metaphysis. No malalignment identified. No significant joint effusion. Interval casting. Question nondisplaced fracture in the lateral femoral metaphysis. Osteopenia. Xr Tibia Fibula Left (2 Views)    Result Date: 3/27/2019  EXAMINATION: 4 XRAY VIEWS OF THE LEFT FEMUR; 2 XRAY VIEWS OF THE LEFT KNEE; 3 XRAY VIEWS OF THE LEFT TIBIA AND FIBULA 3/27/2019 7:00 pm COMPARISON: Left hip 11/14/2015.  HISTORY: ORDERING SYSTEM PROVIDED HISTORY: pain TECHNOLOGIST PROVIDED HISTORY: pain Ordering Physician Provided Reason for Exam: pt twisted wrong, lt knee pain, unable to straighten knee. Acuity: Acute Type of Exam: Initial FINDINGS: Left femur, four views: The bones are diffusely osteopenic. No acute fracture deformity. The hip joint is maintained. The femoral head projects within the acetabulum. No focal soft tissue abnormality is seen. Left knee, two views: The knee is flexed. No acute osseous abnormality. No joint effusion. Joint spaces are not optimally profiled but no significant arthritic changes are apparent. Left tib-fib, three views: There is evidence of a remote healed distal tibia fracture. No acute fracture or dislocation is seen. No focal soft tissue abnormality. No acute osseous abnormality of the left femur. No acute osseous abnormality of the left knee. No acute osseous abnormality of the left tib-fib. Healed remote distal tibia fracture. Marked osteopenia, likely due to disuse. Xr Abdomen (kub) (single Ap View)    Result Date: 4/14/2019  EXAMINATION: SINGLE SUPINE XRAY VIEW(S) OF THE ABDOMEN 4/14/2019 7:39 am COMPARISON: CT abdomen and pelvis  film from 11 April 2019 HISTORY: [address] [facility] [address]: Abd Distention TECHNOLOGIST PROVIDED HISTORY: Abd Distention Ordering Physician Provided Reason for Exam: Abdominal distention. Pt was moving around in the bed. Best films at present time. Acuity: Acute Type of Exam: Initial Additional signs and symptoms: Abdominal distention. Pt was moving around in the bed. Best films at present time. FINDINGS: Portable view time stamped at 748 hours demonstrates an intestinal tube terminating in the midportion of a gaseous Spike dilated stomach. Densities are present over the stomach likely medication. Bipolar pacemaker is in situ with intact leads. Heart size is top-normal, stable.   Gaseous distension of the stomach and loop of bowel in the appearance suggesting partial gastric outlet obstruction. Fluid is present in some small bowel loops and colon distal to the stomach. No small bowel obstruction. Pelvis: Marked distention of the urinary bladder is noted. 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 Distended ureters may be related to reflux or infection. No free pelvic fluid, pelvic or inguinal adenopathy is noted. Peritoneum/Retroperitoneum: No aortic aneurysm. Mild to moderate plaque is noted in the infrarenal abdominal aorta and both proximal iliac arteries. Shotty lymph nodes are present around the aorta in the upper abdomen. No mesenteric adenopathy is noted. Bones/Soft Tissues: Degenerative changes are present in the hips and lower lumbar facets. Estimated biologic radiation dose for this procedure:258.77 mGy/cm2.     1. Dilatation of the thoracic esophagus filled with fluid. No obstructive process is noted. No adjacent enlarged lymph nodes. 2. Trace pleural fluid. 3. Marked distention of the stomach. This appears to extend to the pylorus. Partial gastric outlet obstruction is not excluded. 4. Bilateral dilated ureters without obstructing calculi. Urinary bladder is markedly distended with bladder wall air suggesting emphysematous cystitis. Dilatation of the ureters may be related to reflux or infection. 5. Atherosclerotic disease. 6. Other findings as above. Critical results were called by Dr. Omkar Toscano MD to [address] on 4/11/2019 at 17:37.      Xr Chest Portable    Result Date: 4/16/2019  EXAMINATION: SINGLE XRAY VIEW OF THE CHEST 4/16/2019 6:54 am COMPARISON: 04/15/2019, 610 hours HISTORY: ORDERING SYSTEM PROVIDED HISTORY: ETT placement TECHNOLOGIST PROVIDED HISTORY: ETT placement Ordering Physician Provided Reason for Exam: on vent Acuity: Acute Type of Exam: Initial 77-year-old female on ventilator; check endotracheal tube placement FINDINGS: Portable AP upright view of the chest. Endotracheal tube distal tip overlying the mid trachea approximately 4.1 cm above the level of the ron. Enteric tube traverses the GE junction with distal tip excluded from the field of view. Left subclavian approach cardiac pacemaker device distal lead tips relatively stable in position. Right internal jugular approach central venous catheter distal tip overlying the high right atrium, stable. Cardiac monitor leads overlie the chest. Atherosclerotic calcification of the thoracic aorta. Slight stable volume loss of the left hemithorax. No pneumothorax. No free air. Dense retrocardiac/left basilar airspace consolidation and small left-sided pleural effusion. Stable mild focal opacity at the right mid lung zone. Underlying COPD. Stable mild pulmonary vascular congestion and left-sided predominant parahilar opacity. Visualized osseous structures remain unchanged. 1. Stable multifocal airspace disease as detailed above with dense retrocardiac/left basilar airspace consolidation and small left-sided pleural effusion. Mild pulmonary vascular congestion. Findings may represent edema or multifocal pneumonia. 2. Underlying COPD. 3. Tubes and line as detailed above. Xr Chest Portable    Result Date: 4/15/2019  EXAMINATION: SINGLE XRAY VIEW OF THE CHEST 4/15/2019 6:47 am COMPARISON: 14 April 2019 HISTORY: ORDERING SYSTEM PROVIDED HISTORY: ETT placement TECHNOLOGIST PROVIDED HISTORY: ETT placement Ordering Physician Provided Reason for Exam: on vent Acuity: Acute Type of Exam: Initial FINDINGS: AP portable view of the chest time stamped at 612 hours demonstrates overlying cardiac monitoring electrodes. Endotracheal tube terminates 4 cm above the ron. Bipolar pacemaker enters from the left with intact leads in appropriate positions. Intestinal tube extends beyond the fundus of the stomach, tip not included. Right internal jugular catheter terminates at the cavoatrial junction.   Heart size is normal. Aortic arch is calcified. There is interval improvement in vascular congestion with resolution of perihilar opacities. Some bibasilar opacities remain. No extrapleural air is noted. Osseous structures are stable. Interval improvement in vascular congestion and bilateral opacities consistent with resolving pulmonary edema. Tubes and lines as above. Xr Chest Portable    Result Date: 4/14/2019  EXAMINATION: SINGLE XRAY VIEW OF THE CHEST 4/14/2019 7:57 am COMPARISON: Portable chest 04/13/2019. HISTORY: ORDERING SYSTEM PROVIDED HISTORY: Intubation TECHNOLOGIST PROVIDED HISTORY: Intubation Ordering Physician Provided Reason for Exam: intubation Acuity: Acute Type of Exam: Initial Additional signs and symptoms: intubation FINDINGS: Endotracheal tube terminates over the midthoracic trachea. Dual-chamber pacemaker leads appear unchanged in position. Right IJ approach central venous catheter unchanged in position. Heart size not substantially changed. Perihilar and basilar opacities further increased. Left pleural effusion increased in size. Findings may reflect pulmonary edema, progressed from yesterday's exam.  Left pleural effusion increased in size. Xr Chest Portable    Result Date: 4/12/2019  EXAMINATION: SINGLE XRAY VIEW OF THE CHEST 4/12/2019 5:26 am COMPARISON: November 14, 2015. HISTORY: ORDERING SYSTEM PROVIDED HISTORY: line placement TECHNOLOGIST PROVIDED HISTORY: line placement Ordering Physician Provided Reason for Exam: New right side line placement. Acuity: Acute Type of Exam: Initial Additional signs and symptoms: New right side line placement. FINDINGS: Stable left pectoral trans venous cardiac pacer device. New right IJ central venous catheter with tip near the superior atrial caval junction. Normal lung volume. No new consolidation. Curvilinear radiopacity projecting over the right upper lobe likely represents artifact from a skin fold. No pleural effusion or pneumothorax. Stable cardiomediastinal silhouette and great vessels with redemonstration of atherosclerotic thoracic aorta. New right IJ central venous catheter with tip near the superior atrial caval junction. No pneumothorax. No new consolidation. Thank you for allowing me to participate in the care of your patient. Please feel free to contact me with any questions or concerns.      Estella Romberg, MD

## 2019-05-14 NOTE — PROGRESS NOTES
[address] [facility].    PROGRESS NOTE             5/14/2019    8:51 AM    Name:   Yoselin Ayala  MRN:     [id number]     Acct:      [de-identified]   Room:   2066/2066-01  IP Day:  35  Admit Date:  4/11/2019  2:48 PM    PCP:  Les Flores DO  Code Status:  DNR-CC    Subjective:     C/C:   Chief Complaint   Patient presents with    Abdominal Pain     Interval History Status: not changed. Patient seen and examined at bedside. Patient less talkative than yesterday. Patient reports she is willing to go through with surgery - when asked specifically if only for GB, she says willing to go through with all necessary procedures. Discussed with RN, Dr. Freeman Course to be made aware. Patient denies exacerbation of pain at this time. Brief History:     Chloé Amaral is a 77 y.o. female who was admitted for the management of  sepsis 2/2 UTI and pneumonia. Pt developed cholecystitis, GI bleed, SBO, ileus, refusing surgical intervention. Review of Systems:     Review of Systems   Constitutional: Positive for fatigue. Negative for fever. HENT:        Dry mouth    Eyes: Negative for visual disturbance. Respiratory: Negative for shortness of breath. Cardiovascular: Negative for chest pain and palpitations. Gastrointestinal: Negative for abdominal pain, constipation, nausea and vomiting. Genitourinary: Negative for flank pain. Musculoskeletal: Negative for myalgias. Neurological: Negative for weakness and headaches. Medications: Allergies:     Allergies   Allergen Reactions    Penicillins Shortness Of Breath and Rash    Amoxicillin        Current Meds:   Scheduled Meds:    metoprolol  5 mg Intravenous Q12H    pantoprazole  40 mg Intravenous Daily    And    sodium chloride (PF)  10 mL Intravenous Daily    alteplase  1 mg Intracatheter Once    levalbuterol  1.25 mg Nebulization Q4H    ipratropium  0.5 mg Nebulization Q4H    05/14/19  0637   POCGLU 105 87 81 93       I/O(24Hr): Intake/Output Summary (Last 24 hours) at 5/14/2019 0851  Last data filed at 5/14/2019 0836  Gross per 24 hour   Intake 3126 ml   Output 525 ml   Net 2601 ml       Labs:    [unfilled]    Lab Results   Component Value Date/Time    SPECIAL right hand 1cc 05/10/2019 10:18 AM     Lab Results   Component Value Date/Time    CULTURE NO GROWTH 4 DAYS 05/10/2019 10:18 AM       [facility]    Radiology:    Ct Abdomen Pelvis Wo Contrast Additional Contrast? Oral    Result Date: 4/14/2019  EXAMINATION: CT OF THE ABDOMEN AND PELVIS WITHOUT CONTRAST 4/14/2019 7:34 pm TECHNIQUE: CT of the abdomen and pelvis was performed without the administration of intravenous contrast. Multiplanar reformatted images are provided for review. Dose modulation, iterative reconstruction, and/or weight based adjustment of the mA/kV was utilized to reduce the radiation dose to as low as reasonably achievable. COMPARISON: 04/11/2019 HISTORY: ORDERING SYSTEM PROVIDED HISTORY: ABDOMINAL PAIN TECHNOLOGIST PROVIDED HISTORY: Water soluble contrast only please Ordering Physician Provided Reason for Exam: Abdominal pain - Vented patient. Contrast given via nurse through NG tube. Acuity: Unknown Type of Exam: Unknown Relevant Medical/Surgical History: Hx - Sepsis due to urinary tract infection. FINDINGS: Lower Chest: New moderate layering bilateral pleural effusions with bilateral lower lobe atelectasis. Organs: Limited evaluation due lack of intravenous contrast.  Cholelithiasis redemonstrated. No gallbladder wall thickening or biliary ductal dilatation. Scattered tiny hypodense lesions in the liver are too small to characterize but statistically represent benign cysts or hemangiomas and appear unchanged. The pancreas, spleen, adrenal glands, and kidneys are unremarkable. There is no hydronephrosis or urinary tract calculus. GI/Bowel: The stomach is distended. Enteric tube is in place.   No contrast is seen distal to the pylorus and there is contrast reflux into the distal esophagus. There is no evidence of bowel obstruction. The appendix is not definitely visualized. No focal pericecal inflammatory changes are evident. Pelvis: The urinary bladder is decompressed by Tran catheter. No pelvic mass is seen. Peritoneum/Retroperitoneum: Small amount of free fluid in the pelvic cavity. No free air or focal fluid collection. No abnormal lymph node. Normal abdominal aortic caliber. Moderate calcific atherosclerosis. Bones/Soft Tissues: No acute osseous abnormality. Diffuse anasarca. Moderate degenerative changes in the lumbar spine. 1. Distended, contrast filled stomach with reflux of contrast into the esophagus. No enteric contrast is seen distal to the pylorus suggesting delayed gastric emptying in the setting of ileus. 2. New moderate layering bilateral pleural effusions with bilateral lower lobe atelectasis. 3. Small amount of nonspecific free fluid in the pelvic cavity. 4. Anasarca. 5. Cholelithiasis. Xr Chest (single View Frontal)    Result Date: 5/11/2019  EXAMINATION: ONE XRAY VIEW OF THE CHEST 5/11/2019 6:28 am COMPARISON: 10 May 2019 HISTORY: ORDERING SYSTEM PROVIDED HISTORY: Respiratory failure TECHNOLOGIST PROVIDED HISTORY: Respiratory failure Ordering Physician Provided Reason for Exam: Respiratory failure Acuity: Unknown Type of Exam: Unknown FINDINGS: AP portable view of the chest time stamped at 603 hours demonstrates findings of generalized COPD. Overlying cardiac monitoring electrodes are present. An intestinal tube extends below the fundus of the stomach, tip not included. Right central line terminates in the distal superior vena cava. Bipolar pacemaker enters from the left with intact leads in appropriate positions. Heart size is stable, top-normal.  Lung fields are hyperinflated suggestive of COPD.   Interstitial markings are coarsened, similar to that noted in 2015 likely chronic interstitial change. There is improved aeration at the left lung base with slight blunting of the left costophrenic angle suggesting effusion. There is been interval clearing of right basilar opacities likely resolved atelectasis. Osseous and mediastinal structures are age-appropriate. No vascular congestion or extrapleural air is noted. Improved aeration of both lung bases with resolved right basilar opacity. Mild left basilar opacity persists with blunting of the left costophrenic angle with small effusion on the left suspected. Findings of COPD and suspected chronic fibrotic change are noted. No extrapleural air. Tubes and lines as above. Xr Chest (single View Frontal)    Result Date: 5/2/2019  EXAMINATION: SINGLE XRAY VIEW OF THE CHEST 5/2/2019 6:34 am COMPARISON: 29 April 2019 HISTORY: ORDERING SYSTEM PROVIDED HISTORY: COPD TECHNOLOGIST PROVIDED HISTORY: COPD Ordering Physician Provided Reason for Exam: COPD Acuity: Acute Type of Exam: Initial FINDINGS: AP portable view of the chest time stamped at 630 hours demonstrates stable cardiac size. Overlying cardiac monitoring electrodes are present. Right-sided PICC line terminates in the right atrium. Bipolar pacemaker enters from the left with intact leads in appropriate positions. There is been no significant interval change in bilateral interstitial opacities, representing interval change since 2015. This may be related to worsening interstitial disease or superimposed venous congestion. Bilateral effusions are noted with bibasilar opacities, either atelectasis or edema. Continued bilateral effusions, bibasilar opacities and bilateral interstitial opacities in the upper lobes. Findings may be related to worsening interstitial disease and edema. Airspace disease is not excluded.      Xr Chest (single View Frontal)    Result Date: 4/13/2019  EXAMINATION: SINGLE XRAY VIEW OF THE CHEST 4/13/2019 7:18 am COMPARISON: April 12, 2019 HISTORY: ORDERING SYSTEM PROVIDED HISTORY: dyspnea TECHNOLOGIST PROVIDED HISTORY: dyspnea Ordering Physician Provided Reason for Exam: dyspnea Acuity: Acute Type of Exam: Subsequent/Follow-up Additional signs and symptoms: dyspnea FINDINGS: A right IJ catheter is seen with its tip terminating at the superior cavoatrial junction. The left chest wall pacemaker and leads are stable. The cardiomediastinal silhouette is stable. There is interval increased opacity at the right lung base, may be related to atelectasis versus pneumonia. There is small atelectasis and mild pleural effusion at the left lung base. There is no pneumothorax. There is no acute osseous abnormality. Interval increased opacity at the right lung base, may be related to atelectasis versus pneumonia. Small atelectasis and mild pleural effusion at the left lung, new since the prior study. Xr Chest Standard (2 Vw)    Result Date: 4/29/2019  EXAMINATION: TWO VIEWS OF THE CHEST 4/29/2019 8:04 pm COMPARISON: 04/27/2019 HISTORY: ORDERING SYSTEM PROVIDED HISTORY: Follow-up lung infiltrate TECHNOLOGIST PROVIDED HISTORY: Follow-up lung infiltrate Ordering Physician Provided Reason for Exam: Follow-up infiltrate. Pt unable to lean forward - unable to get proper sponge behind pt for lateral. Pt unable to raise left arm at all for lateral. Best possible lateral obtained. Acuity: Unknown Type of Exam: Unknown Additional signs and symptoms: Follow-up infiltrate. Pt unable to lean forward - unable to get proper sponge behind pt for lateral. Pt unable to raise left arm at all for lateral. Best possible lateral obtained. FINDINGS: Left chest cardiac pacemaker device is in place. Right IJ central venous catheter in place with distal tip at the cavoatrial junction. Heart size is within normal limits. No vascular congestion. There are small to moderate pleural effusions.   There are interstitial opacities in the upper lungs, which could be related to scarring and/or developing infiltrate, slightly improved in appearance when compared to 04/27/2019. No evidence of pneumothorax. 1.  Small to moderate bilateral pleural effusions, better visualized on lateral view. 2.  Upper lobe interstitial opacities, slightly improved when compared to the previous study, possibly related to underlying fibrotic change or residual infiltrate. Xr Knee Left (1-2 Views)    Result Date: 5/8/2019  EXAMINATION: 2 XRAY VIEWS OF THE LEFT KNEE 5/7/2019 11:19 am COMPARISON: Left knee radiographs 03/30/2019. HISTORY: ORDERING SYSTEM PROVIDED HISTORY: fracture TECHNOLOGIST PROVIDED HISTORY: fracture Ordering Physician Provided Reason for Exam: left knee/distal femur pain Acuity: Acute Type of Exam: Initial FINDINGS: Bones are osteopenic. No suprapatellar joint effusion. There is a impacted, transverse fracture of the distal femoral metadiaphysis. Increased sclerosis along the fracture line suggests interval healing. Fracture fragments are in unchanged, near anatomic alignment. No acute dislocation. No new fracture is seen. Impacted, transverse fracture of the distal femoral metadiaphysis is in unchanged alignment and demonstrates interval healing.      Xr Abdomen (kub) (single Ap View)    Result Date: 5/10/2019  EXAMINATION: ONE SUPINE XRAY VIEW(S) OF THE ABDOMEN 5/10/2019 9:14 am COMPARISON: Small-bowel series 05/09/2019 HISTORY: ORDERING SYSTEM PROVIDED HISTORY: Small bowel obstruction ([address] TECHNOLOGIST PROVIDED HISTORY: TO ACCOMPANY SMALL BOWEL EXAM SMALL BOWEL OBSTRUCTION Ordering Physician Provided Reason for Exam: SMALL BOWEL FOLLOW THRU - 20 HOUR DELAYED FILM FINDINGS: Significant interval decreased amount of retained contrast in the stomach compared to last image from earlier small bowel series which likely relates to suctioning of the contrast.  Majority of previously seen contrast within the pelvis likely in the rectum is no longer visualized and has likely passed through. Residual contrast remains within the colon and small bowel. NG tube is in place with side hole in the region of the GE junction. Partially visualized mild left pleural effusion associated atelectasis. 1. Interval presumed suctioning of contrast from the stomach which now appears relatively empty. Continued progression of contrast through the small and large bowel as described. 2. NG tube side hole at the level of the GE junction, consider advancement of 2-3 cm. Xr Abdomen (kub) (single Ap View)    Result Date: 5/8/2019  EXAMINATION: SINGLE SUPINE XRAY VIEW(S) OF THE ABDOMEN 5/8/2019 8:59 am COMPARISON: CT abdomen from 05/05/2019, and KUB from 04/19/2019 HISTORY: ORDERING SYSTEM PROVIDED HISTORY: recheck obstruction TECHNOLOGIST PROVIDED HISTORY: recheck obstruction Ordering Physician Provided Reason for Exam: recheck obstruction Acuity: Unknown Type of Exam: Unknown FINDINGS: Again noted cholecystostomy tube, electrode leads from a pacemaker overlying the heart, and overlying electrode leads/tubing. NG tube no longer demonstrated. Gas-filled bowel loops without marked dilatation; cecum measures 8 cm, slightly increased as compared to the previous study. No obvious free air. No mass or organomegaly. Bones unchanged. Retrocardiac opacity, hyperinflated lungs and probable pleural effusions. NG tube removed. Gas-filled bowel more suggestive ileus; no clear cut obstruction. Cecum measures 8 cm. Cholecystostomy tube in unchanged position. Additional unchanged findings, as above.  RECOMMENDATION: Continued clinical correlation and follow-up     Xr Abdomen (kub) (single Ap View)    Result Date: 4/19/2019  EXAMINATION: SINGLE SUPINE XRAY VIEW(S) OF THE ABDOMEN 4/19/2019 9:48 am COMPARISON: April 14, 2019 HISTORY: ORDERING SYSTEM PROVIDED HISTORY: pain TECHNOLOGIST PROVIDED HISTORY: pain Ordering Physician Provided Reason for Exam: Abdominal pain and distentsion Acuity: Acute Type of Exam: performed with the administration of intravenous contrast. Multiplanar reformatted images are provided for review. Dose modulation, iterative reconstruction, and/or weight based adjustment of the mA/kV was utilized to reduce the radiation dose to as low as reasonably achievable. COMPARISON: April 14, 2019 HISTORY: ORDERING SYSTEM PROVIDED HISTORY: Check for abscess/fluid collection around ashley tube site. TECHNOLOGIST PROVIDED HISTORY: Ordering Physician Provided Reason for Exam: Patient c/o abd pain around her ashley site and drainage tube. FINDINGS: Evaluation is limited by motion. Lower Chest: Moderate bilateral pleural effusions. Organs: Multiple liver hypodensities measuring up to 4 mm are incompletely characterized but in the absence of risk factors likely represent cysts requiring no specific follow-up. Cholecystostomy tube is in place. No adjacent focal drainable fluid collection. No pancreatic lesion. No splenomegaly. No adrenal lesion. No hydronephrosis. No renal lesion. GI/Bowel: Stomach is markedly distended. Swirled appearance of the mesentery is seen within the mid to lower abdomen with adjacent thickened loops of small bowel. Peritoneum/Retroperitoneum: Atherosclerotic calcification of the abdominal aorta without aneurysmal dilatation. No adenopathy. Bones/Soft Tissues: No acute soft tissue abnormality. Diffuse osteopenia. Lumbar spine degenerative changes. Bowel obstruction which is suspected to be caused by an internal hernia. Cholecystostomy tube is in place. No surrounding fluid collection. Nm Gi Blood Loss    Result Date: 5/3/2019  EXAMINATION: NUCLEAR MEDICINE GASTRIC BLEEDING STUDY 5/3/2019 TECHNIQUE: Following the intravenous injection of 23.8 mCi of 99 mTc-labeled RBC's, a flow study and standard images of the abdomen was obtained over a total period of 60 minutes COMPARISON: None.  HISTORY: ORDERING SYSTEM PROVIDED HISTORY: GI BLEEDING TECHNOLOGIST PROVIDED HISTORY: Ordering Physician Provided Reason for Exam: GI bleeding Acuity: Unknown Type of Exam: Unknown FINDINGS: Activity is seen within the blood pool, including the great vessels, heart, liver, and spleen. There was no abnormal accumulation of radioactivity in the abdomen to suggest active bleeding during study acquisition. No evidence of active GI bleeding during acquisition. RECOMMENDATIONS: If the patient shows hemodynamic signs of an active bleed in the next 20 hours, additional images can be acquired. Kate Kaur Device Plmt/replace/removal    Result Date: 4/30/2019  PROCEDURE: ULTRASOUND GUIDED VASCULAR ACCESS. FLUOROSCOPY GUIDED PICC PLACEMENT 4/30/2019. HISTORY: ORDERING SYSTEM PROVIDED HISTORY: TPN TECHNOLOGIST PROVIDED HISTORY: TPN Lumen?->Double Lumen SEDATION: None FLUOROSCOPY DOSE AND TYPE OR TIME AND EXPOSURES: 7 seconds; D  TECHNIQUE: This procedure was performed by Dr. Lynne Amezquita. Informed consent was obtained after a detailed explanation of the procedure including risks, benefits, and alternatives. Universal protocol was observed. The right arm was prepped and draped in sterile fashion using maximum sterile barrier technique. Local anesthesia was achieved with lidocaine. A micropuncture needle was used to access the right basilic vein using ultrasound guidance. An ultrasound image demonstrating patency of the vein with needle tip located within it. An image was obtained and stored in PACs. A 0.018 guidewire was used to place a peel-a-way sheath and a 5 [place] dual-lumen PICC was advanced with fluoroscopic guidance with the tip at the cavo-atrial junction. The catheter flushed easily and there was a good blood return. The catheter was secured to the skin. The patient tolerated the procedure well and there were no immediate complications. FINDINGS: Fluoroscopic image demonstrates the tip of the catheter at the cavo-atrial junction.      Successful ultrasound and fluoroscopy guided PICC placement     Us Gallbladder Ruq    Result Date: 4/19/2019  EXAMINATION: RIGHT UPPER QUADRANT ULTRASOUND 4/19/2019 10:48 am COMPARISON: CT abdomen and pelvis 4/14/2018 HISTORY: ORDERING SYSTEM PROVIDED HISTORY: ABDOMINAL PAIN FINDINGS: Pancreas is not well visualized. No liver lesion. Gallbladder wall thickening. Gallbladder sludge. Cholelithiasis. Pericholecystic fluid. Common bile duct measures at the upper limits of normal at 7 mm. Findings suggesting acute cholecystitis. Xr Chest Portable    Result Date: 5/10/2019  EXAMINATION: ONE XRAY VIEW OF THE CHEST 5/10/2019 1:28 am COMPARISON: May 2, 2019. HISTORY: ORDERING SYSTEM PROVIDED HISTORY: low o2 sat TECHNOLOGIST PROVIDED HISTORY: low o2 sat Ordering Physician Provided Reason for Exam: Low o2 sat Acuity: Acute Type of Exam: Initial FINDINGS: Hyperexpanded right lung volume. Left greater than right basilar heterogeneous opacities with left basilar consolidation. Small bilateral pleural effusions. Stable cardiomediastinal silhouette and great vessels. Enteric contrast in the stomach and distal esophagus. Enteric tube courses into the stomach but tip is not definitely seen due to contrast in the stomach. Stable left pectoral cardiac pacer device. Stable right PICC. Left greater than right basilar heterogeneous opacities with left basilar consolidation. Differential considerations include atelectasis and an infectious/inflammatory process. Small bilateral pleural effusions. Enteric contrast in the stomach and distal esophagus. Enteric tube courses into the stomach but tip is not definitely seen due to contrast in the stomach.      Xr Chest Portable    Result Date: 4/27/2019  EXAMINATION: SINGLE XRAY VIEW OF THE CHEST 4/27/2019 8:47 am COMPARISON: 04/26/2019 HISTORY: ORDERING SYSTEM PROVIDED HISTORY: Assess for pulm edema vs PNA TECHNOLOGIST PROVIDED HISTORY: Assess for pulm edema vs PNA Ordering Physician Provided Reason for Exam: cough, congestion Acuity: Acute Type of Exam: Initial FINDINGS: Right IJ central venous catheter is in place tip in the SVC right atrial junction. Pacer wires are in place. The heart and mediastinal structures are stable. Pleural effusions are present with bibasilar atelectasis. Bilateral lung infiltrates are present in the upper lung fields. Persistent pleural effusions with bibasilar atelectasis and bilateral lung infiltrates with areas of consolidation developing in the upper lobes. Xr Chest Portable    Result Date: 4/26/2019  EXAMINATION: SINGLE XRAY VIEW OF THE CHEST 4/26/2019 6:51 am COMPARISON: April 24, 2019 HISTORY: ORDERING SYSTEM PROVIDED HISTORY: Pleural effusions TECHNOLOGIST PROVIDED HISTORY: Pleural effusions Ordering Physician Provided Reason for Exam: pleural effusion Acuity: Acute Type of Exam: Initial FINDINGS: Right IJ line in good position. Pacer device is stable. Cardiac leads overlie the chest.  Stable cardiomediastinal contours with calcified aortic arch. Left effusion and associated airspace disease, slightly decreased. Chronic blunting of right costophrenic sulcus may represent scarring or chronic effusion. Increased reticular markings right upper chest and left upper chest possibly interstitial edema or interstitial pneumonia. No new airspace consolidation. Increasing reticular markings upper chest bilaterally right greater than left possibly due to edema or interstitial pneumonitis. Improving left effusion with associated retrocardiac airspace disease. Pleural-parenchymal scarring or effusion in the right lung base, stable.      Xr Chest Portable    Result Date: 4/24/2019  EXAMINATION: SINGLE XRAY VIEW OF THE CHEST 4/24/2019 6:05 am COMPARISON: Chest radiograph dated 04/22/2019 HISTORY: ORDERING SYSTEM PROVIDED HISTORY: Acute respiratory failure, pleural effusion TECHNOLOGIST PROVIDED HISTORY: Acute respiratory failure, pleural effusion Ordering Physician Provided Reason for VIEW OF THE CHEST 4/21/2019 3:08 pm COMPARISON: April 19, 2019 HISTORY: ORDERING SYSTEM PROVIDED HISTORY: hypoxia TECHNOLOGIST PROVIDED HISTORY: hypoxia Ordering Physician Provided Reason for Exam: hypoxia Acuity: Unknown Type of Exam: Unknown FINDINGS: Enteric tube in the stomach. ET tube is been removed. Right IJ catheter terminates in the atrial caval junction. Bipolar pacer on the left unchanged. Heart and mediastinum unremarkable. Moderate edema unchanged. Small effusions, left greater than right. Bony thorax intact. Status post extubation. Life support apparatus otherwise stable. Moderate edema and effusions unchanged. Xr Chest Portable    Result Date: 4/19/2019  EXAMINATION: SINGLE XRAY VIEW OF THE CHEST 4/19/2019 5:51 am COMPARISON: April 18, 2019 HISTORY: ORDERING SYSTEM PROVIDED HISTORY: ETT placement TECHNOLOGIST PROVIDED HISTORY: ETT placement Ordering Physician Provided Reason for Exam: Vent Pt check ETT placement Acuity: Acute Type of Exam: Subsequent/Follow-up Additional signs and symptoms: Vent Pt check ETT placement FINDINGS: Tubes and lines are unchanged. Persistent bibasilar lung opacities and pleural effusions. Mild pulmonary edema. No significant interval change. Xr Chest Portable    Result Date: 4/18/2019  EXAMINATION: SINGLE XRAY VIEW OF THE CHEST 4/18/2019 6:21 am COMPARISON: April 17, 2019 HISTORY: ORDERING SYSTEM PROVIDED HISTORY: ETT placement TECHNOLOGIST PROVIDED HISTORY: ETT placement Ordering Physician Provided Reason for Exam: on vent Acuity: Acute Type of Exam: Initial FINDINGS: Right IJ line and NG tube are stable. Interval advancement of ET tube terminating 3.1 cm from ron. Implanted cardiac device is present. Left-sided effusion and associated airspace disease is stable. Hazy right basilar opacity possibly edema or atelectasis. No pneumothorax. Increased opacity left upper chest possibly focal area of atelectasis, new from prior.      Advanced ETT from prior exam, now 3.1 cm from ron. Increased opacity left upper chest suspicious for atelectasis. Additional supporting devices are stable. Xr Chest Portable    Result Date: 4/17/2019  EXAMINATION: SINGLE XRAY VIEW OF THE CHEST 4/17/2019 7:15 am COMPARISON: 16 April 2019 HISTORY: ORDERING SYSTEM PROVIDED HISTORY: ETT placement TECHNOLOGIST PROVIDED HISTORY: ETT placement Ordering Physician Provided Reason for Exam: on vent Acuity: Acute Type of Exam: Initial FINDINGS: AP portable view of the chest time stamped at 613 hours demonstrates overlying cardiac monitoring electrodes. A right internal jugular catheter terminates in the superior vena cava. Endotracheal tube is somewhat high riding terminating 6.4 cm above the ron. Intestinal tube extends beyond the body of the stomach, tip not included on the exam.  Bipolar pacemaker enters from the left with intact leads in appropriate positions. Heart size is normal.  Left pleural effusion is noted there is continued volume loss in the left hemithorax with stable mild vascular congestion, perihilar opacities, and faint opacity in the right mid and lower lung field. Underlying COPD is noted. Osseous structures are stable. There is little change from prior study. Findings of COPD, mild central vascular congestion, left effusion, and scattered opacities favoring interstitial edema with multifocal pneumonitis not excluded. Tubes and lines as above. However, endotracheal tube is high riding. The findings were sent to the Radiology Results [address] at 7:58 am on 4/17/2019to be communicated to a licensed caregiver. RECOMMENDATION: Suggest advancement of endotracheal tube.      Xr Chest Portable    Result Date: 4/16/2019  EXAMINATION: SINGLE XRAY VIEW OF THE CHEST 4/16/2019 6:54 am COMPARISON: 04/15/2019, 610 hours HISTORY: ORDERING SYSTEM PROVIDED HISTORY: ETT placement TECHNOLOGIST PROVIDED HISTORY: ETT placement Ordering Physician Provided Reason for Exam: on vent Acuity: Acute Type of Exam: Initial 42-year-old female on ventilator; check endotracheal tube placement FINDINGS: Portable AP upright view of the chest. Endotracheal tube distal tip overlying the mid trachea approximately 4.1 cm above the level of the ron. Enteric tube traverses the GE junction with distal tip excluded from the field of view. Left subclavian approach cardiac pacemaker device distal lead tips relatively stable in position. Right internal jugular approach central venous catheter distal tip overlying the high right atrium, stable. Cardiac monitor leads overlie the chest. Atherosclerotic calcification of the thoracic aorta. Slight stable volume loss of the left hemithorax. No pneumothorax. No free air. Dense retrocardiac/left basilar airspace consolidation and small left-sided pleural effusion. Stable mild focal opacity at the right mid lung zone. Underlying COPD. Stable mild pulmonary vascular congestion and left-sided predominant parahilar opacity. Visualized osseous structures remain unchanged. 1. Stable multifocal airspace disease as detailed above with dense retrocardiac/left basilar airspace consolidation and small left-sided pleural effusion. Mild pulmonary vascular congestion. Findings may represent edema or multifocal pneumonia. 2. Underlying COPD. 3. Tubes and line as detailed above. Xr Chest Portable    Result Date: 4/15/2019  EXAMINATION: SINGLE XRAY VIEW OF THE CHEST 4/15/2019 6:47 am COMPARISON: 14 April 2019 HISTORY: ORDERING SYSTEM PROVIDED HISTORY: ETT placement TECHNOLOGIST PROVIDED HISTORY: ETT placement Ordering Physician Provided Reason for Exam: on vent Acuity: Acute Type of Exam: Initial FINDINGS: AP portable view of the chest time stamped at 612 hours demonstrates overlying cardiac monitoring electrodes. Endotracheal tube terminates 4 cm above the ron.   Bipolar pacemaker enters from the left with intact leads in appropriate positions. Intestinal tube extends beyond the fundus of the stomach, tip not included. Right internal jugular catheter terminates at the cavoatrial junction. Heart size is normal.  Aortic arch is calcified. There is interval improvement in vascular congestion with resolution of perihilar opacities. Some bibasilar opacities remain. No extrapleural air is noted. Osseous structures are stable. Interval improvement in vascular congestion and bilateral opacities consistent with resolving pulmonary edema. Tubes and lines as above. Xr Chest Portable    Result Date: 4/14/2019  EXAMINATION: SINGLE XRAY VIEW OF THE CHEST 4/14/2019 7:57 am COMPARISON: Portable chest 04/13/2019. HISTORY: ORDERING SYSTEM PROVIDED HISTORY: Intubation TECHNOLOGIST PROVIDED HISTORY: Intubation Ordering Physician Provided Reason for Exam: intubation Acuity: Acute Type of Exam: Initial Additional signs and symptoms: intubation FINDINGS: Endotracheal tube terminates over the midthoracic trachea. Dual-chamber pacemaker leads appear unchanged in position. Right IJ approach central venous catheter unchanged in position. Heart size not substantially changed. Perihilar and basilar opacities further increased. Left pleural effusion increased in size. Findings may reflect pulmonary edema, progressed from yesterday's exam.  Left pleural effusion increased in size.      Vl Upper Extremity Bilateral Venous Duplex    Result Date: 4/22/2019    [facility]  Vascular Upper Extremities Veins Procedure   Patient Name   Marco A Coleman     Date of Study           04/22/2019                 Dez Primangel   Date of Birth  1950  Gender                  Female   Age            77 year(s)  Race                       Room Number    2002        Height:                 59.84 inch, 152 cm   Corporate ID # [id number]    Weight:                 102 pounds, 46.3 kg   Patient Acct # [de-identified]   BSA:        1.4 m^2     BMI:       20.03 kg/m^2 MR #           [id number]      Sonographer             Roderick Mario   Accession #    [id number]   Interpreting Physician  Hema Cisneros   Referring                  Referring Physician     Marco A Marin *  Nurse  Practitioner  Procedure Type of Study:   Veins: Upper Extremities Veins, Venous Scan Upper Bilateral.  Indications for Study:Swelling. Patient Status: In Patient. Technical Quality:Limited visualization. Limitation reason:Line placements. Conclusions   Summary   No evidence of superficial or deep venous thrombosis in both upper  extremities. Signature   ----------------------------------------------------------------  Electronically signed by Roderick Mario(Sonographer) on  04/22/2019 11:46 AM  ----------------------------------------------------------------   ----------------------------------------------------------------  Electronically signed by Airam Oliveira(Interpreting  physician) on 04/22/2019 09:31 PM  ----------------------------------------------------------------  Findings:   Right Impression:                  Left Impression:  Internal jugular vein not          The internal jugular, subclavian,  visualized due to line placement. axillary, brachial, radial, and ulnar                                     veins demonstrate normal  Subclavian, axillary, brachial,    compressibility with phasic Doppler  radial, and ulnar veins            signals. demonstrate normal compressibility  with phasic Doppler signals. Normal compressibility of the cephalic                                     vein. Normal compressibility of the  cephalic vein. Normal compressibility of the basilic                                     vein. Normal compressibility of the  basilic vein. Velocities are measured in cm/s ; Diameters are measured in cm Right UE Vein Measurements 2D Measurements +------------------------------------+----------+---------------+----------+ ! Location +------------------------------------+----------+---------------+----------+ ! Basilic at [address]                       ! Yes       ! Yes            ! None      ! +------------------------------------+----------+---------------+----------+ ! Cephalic at UA                      ! Yes       ! Yes            ! None      ! +------------------------------------+----------+---------------+----------+ ! Cephalic at AF                      ! Yes       ! Yes            ! None      ! +------------------------------------+----------+---------------+----------+ ! Cephalic at [address]                      ! Yes       ! Yes            ! None      ! +------------------------------------+----------+---------------+----------+ Doppler Measurements +-------------------------+-----------------------+------------------------+ ! Location                 ! Signal                 !Reflux                  ! +-------------------------+-----------------------+------------------------+ ! SCV                      ! Phasic                 ! No                      ! +-------------------------+-----------------------+------------------------+ ! Axillary                 ! Phasic                 ! No                      ! +-------------------------+-----------------------+------------------------+ ! Brachial                 !Phasic                 ! No                      ! +-------------------------+-----------------------+------------------------+ Left UE Vein Measurements 2D Measurements +------------------------------------+----------+---------------+----------+ ! Location                            ! Visualized! Compressibility! Thrombosis! +------------------------------------+----------+---------------+----------+ ! Prox IJV                            ! Yes       ! Yes            ! None      ! +------------------------------------+----------+---------------+----------+ ! Dist IJV                            ! Yes       ! Yes            ! None      ! +------------------------------------+----------+---------------+----------+ ! Prox SCV                            ! Yes       ! Yes            ! None      ! +------------------------------------+----------+---------------+----------+ ! Dist SCV                            ! Yes       ! Yes            ! None      ! +------------------------------------+----------+---------------+----------+ ! Prox Axillary                       ! Yes       ! Yes            ! None      ! +------------------------------------+----------+---------------+----------+ ! Dist Axillary                       ! Yes       ! Yes            ! None      ! +------------------------------------+----------+---------------+----------+ ! Prox Brachial                       !Yes       ! Yes            ! None      ! +------------------------------------+----------+---------------+----------+ ! Dist Brachial                       !Yes       ! Yes            ! None      ! +------------------------------------+----------+---------------+----------+ ! Prox Radial                         !Yes       ! Yes            ! None      ! +------------------------------------+----------+---------------+----------+ ! Dist Radial                         !Yes       ! Yes            ! None      ! +------------------------------------+----------+---------------+----------+ ! Prox Ulnar                          ! Yes       ! Yes            ! None      ! +------------------------------------+----------+---------------+----------+ ! Dist Ulnar                          ! Yes       ! Yes            ! None      ! +------------------------------------+----------+---------------+----------+ ! Basilic at UA                       ! Yes       ! Yes            ! None      ! +------------------------------------+----------+---------------+----------+ ! Cephalic at UA                      ! Yes       ! Yes            ! None      ! +------------------------------------+----------+---------------+----------+ ! Cephalic at AF !Yes       !Yes            ! None      ! +------------------------------------+----------+---------------+----------+ Doppler Measurements +-------------------------+-----------------------+------------------------+ ! Location                 ! Signal                 !Reflux                  ! +-------------------------+-----------------------+------------------------+ ! IJV                      ! Phasic                 !                        ! +-------------------------+-----------------------+------------------------+ ! SCV                      ! Phasic                 !                        ! +-------------------------+-----------------------+------------------------+ ! Axillary                 ! Phasic                 !                        ! +-------------------------+-----------------------+------------------------+ ! Brachial                 !Phasic                 !                        ! +-------------------------+-----------------------+------------------------+    Vl Dup Lower Extremity Venous Bilateral    Result Date: 5/7/2019    [facility]  Vascular Lower Extremities DVT Study Procedure   Patient Name   America Maravilla     Date of Study           05/07/2019                 Jayshree Mejia   Date of Birth  1950  Gender                  Female   Age            77 year(s)  Race                       Room Number    2123        Height:                 59.84 inch, 152 cm   Corporate ID # [id number]    Weight:                 102 pounds, 46.3 kg   Patient Acct # [de-identified]   BSA:        1.4 m^2     BMI:      20.03 kg/m^2   MR #           [id number]      Sonographer             Hector Hanna, [facility]   Accession #    [id number]   Interpreting Physician  Tang Marshall   Referring                  Referring Physician     Rufus Richardson MD  Nurse  Practitioner  Procedure Type of Study:   Veins: Lower Extremities DVT Study, Venous Scan Lower Bilateral.  Indications for Study:Pain and swelling. !Yes       !Yes            ! None      ! +------------------------------------+----------+---------------+----------+ ! Mid Femoral                         !Yes       ! Yes            ! None      ! +------------------------------------+----------+---------------+----------+ ! Dist Femoral                        !Yes       ! Yes            ! None      ! +------------------------------------+----------+---------------+----------+ ! Deep Femoral                        !Yes       ! Yes            ! None      ! +------------------------------------+----------+---------------+----------+ ! Popliteal                           !Yes       ! Yes            ! None      ! +------------------------------------+----------+---------------+----------+ ! Sapheno Femoral Junction            ! Yes       ! Yes            ! None      ! +------------------------------------+----------+---------------+----------+ ! PTV                                 ! Partial   !Yes            ! None      ! +------------------------------------+----------+---------------+----------+ ! Peroneal                            !Partial   !Yes            ! None      ! +------------------------------------+----------+---------------+----------+ ! Gastroc                             ! Yes       ! Yes            ! None      ! +------------------------------------+----------+---------------+----------+ ! GSV Thigh                           ! Yes       ! Yes            ! None      ! +------------------------------------+----------+---------------+----------+ ! GSV Knee                            ! Yes       ! Yes            ! None      ! +------------------------------------+----------+---------------+----------+ ! GSV Ankle                           ! Yes       ! Yes            ! None      ! +------------------------------------+----------+---------------+----------+ ! SSV                                 ! Partial   !Yes            ! None      ! +------------------------------------+----------+---------------+----------+ Left Lower Extremities DVT Study Measurements Left 2D Measurements +------------------------------------+----------+---------------+----------+ ! Location                            ! Visualized! Compressibility! Thrombosis! +------------------------------------+----------+---------------+----------+ ! Common Femoral                      !Yes       ! Yes            ! None      ! +------------------------------------+----------+---------------+----------+ ! Prox Femoral                        !Yes       ! Yes            ! None      ! +------------------------------------+----------+---------------+----------+ ! Mid Femoral                         !Yes       ! Yes            ! None      ! +------------------------------------+----------+---------------+----------+ ! Dist Femoral                        !Yes       ! Yes            ! None      ! +------------------------------------+----------+---------------+----------+ ! Deep Femoral                        !Yes       ! Yes            ! None      ! +------------------------------------+----------+---------------+----------+ ! Popliteal                           !Yes       ! Yes            ! None      ! +------------------------------------+----------+---------------+----------+ ! Sapheno Femoral Junction            ! Yes       ! Yes            ! None      ! +------------------------------------+----------+---------------+----------+ ! PTV                                 ! Partial   !Yes            ! None      ! +------------------------------------+----------+---------------+----------+ ! Peroneal                            !Partial   !Yes            ! None      ! +------------------------------------+----------+---------------+----------+ ! Gastroc                             ! Yes       ! Yes            ! None      ! +------------------------------------+----------+---------------+----------+ ! GSV Thigh !Yes       !Yes            ! None      ! +------------------------------------+----------+---------------+----------+ ! GSV Knee                            ! Yes       ! Yes            ! None      ! +------------------------------------+----------+---------------+----------+ ! GSV Ankle                           ! Yes       ! Yes            ! None      ! +------------------------------------+----------+---------------+----------+ ! SSV                                 ! Partial   !Yes            ! None      ! +------------------------------------+----------+---------------+----------+    Ir Cholecystostomy Percutaneous Complete    Result Date: 4/22/2019  PROCEDURE: ULTRASOUND and fluoroscopic GUIDED CHOLECYSTOSTOMY TUBE PLACEMENT April 22, 2019 HISTORY: ORDERING SYSTEM PROVIDED HISTORY: acute cholecystitis TECHNOLOGIST PROVIDED HISTORY: acute cholecystitis SEDATION: 75 mcg IV fentanyl for pain TECHNIQUE: Informed consent was obtained after a detailed explanation of the procedure including risks, benefits, and alternatives. Universal protocol was followed. A suitable skin site was prepped and draped in sterile fashion following ultrasound localization. An 18 gauge needle was advanced under ultrasound guidance into the gallbladder via transhepatic route and a 0.035 guidewire was used to place a 8 [place] cholecystostomy tube under fluoroscopic guidance. The catheter was sutured to the skin and the patient tolerated the procedure well. Thick bilious material was sent for laboratory analysis. The catheter was attached to gravity drainage. FINDINGS: Ultrasound image demonstrates distended gallbladder. Bilious fluid was sent for culture. Successful placement of transhepatic 8 Nigerien percutaneous cholecystostomy tube. Nm Hepatobiliary Scan W Ejection Fraction    Result Date: 4/20/2019  EXAMINATION: NUCLEAR MEDICINE HEPATOBILIARY SCINTIGRAPHY (HIDA SCAN).  4/20/2019 2:15 pm TECHNIQUE: Approximately 5.6 [id number] Mebrofenin (Choletec) was administered IV. Then, dynamic images of the abdomen were obtained in the anterior projection for 60 mins. A right lateral view was also obtained at 60 mins. Delayed images up to 4 hours were obtained. COMPARISON: Ultrasound 04/19/2019 HISTORY: ORDERING SYSTEM PROVIDED HISTORY: CHOLECYSTITIS TECHNOLOGIST PROVIDED HISTORY: Ordering Physician Provided Reason for Exam: Cholecystitis Acuity: Unknown Type of Exam: Unknown FINDINGS: Prompt, homogenous uptake by the liver is noted with normal appearance of radiotracer excretion into the biliary system. Clearance of bloodpool activity appears appropriate. Normal small bowel activity is seen. Prominent activity within the common bile duct. The gallbladder is not visualized by the end of the exam.     Absent filling of the gallbladder consistent with acute cholecystitis. Fl Small Bowel Follow Through Only    Result Date: 5/10/2019  EXAMINATION: SMALL BOWEL FOLLOW THROUGH SERIES 5/9/2019 TECHNIQUE: Small bowel follow through series was performed with overhead images. FLUOROSCOPY DOSE AND TYPE OR TIME AND EXPOSURES: No fluoroscopic images obtained. COMPARISON: CT abdomen pelvis 05/05/2019 HISTORY: ORDERING SYSTEM PROVIDED HISTORY: internal hernia TECHNOLOGIST PROVIDED HISTORY: Water soluble contrast only. Study to be done ONLY if NGT is placed by IR internal hernia FINDINGS:  image demonstrates NG tube overlying the left side of the abdomen within the stomach. Surgical drain overlies the right side of the abdomen. There is a nonspecific bowel gas pattern with mild dilated loops of bowel in lower abdomen. Initial images demonstrate filling of the stomach. At 1 hour and 45 minutes no significant contrast is noted in the small bowel. The 4-1/2 hour image demonstrates contrast within loops of bowel within the mid and lower abdomen and pelvis. There appears to be contrast extending to the colon in the right side of the abdomen.   Contrast is noted within the pelvis which may be within the bladder or rectum. Significant delay in emptying of the stomach with persistent contrast noted at the 6 hour concha. There is contrast extending into the bowel which appears to be in the colon. Subtle findings are limited. Consider follow-up radiograph of the abdomen in 6-8 hours. Ir Place Ng Tube By Dr Hortensia Arias    Result Date: 5/9/2019  PROCEDURE: XR PLACE NASOGASTRIC TUBE PHYS 5/9/2019 HISTORY: ORDERING SYSTEM PROVIDED HISTORY: ileus TECHNOLOGIST PROVIDED HISTORY: ileus Ordering Physician Provided Reason for Exam: ILEUS Acuity: Unknown Type of Exam: Unknown CONTRAST: None SEDATION: None FLUOROSCOPY DOSE AND TYPE OR TIME AND EXPOSURES: 21 seconds; D AP 46 DESCRIPTION OF PROCEDURE AND FINDINGS: This procedure was performed by Dr. Philip Douglas. Under intermittent fluoroscopic guidance a 12 [place] nasogastric tube was placed through the right nostril and directed under fluoroscopy into the stomach. A small amount of air was injected confirming the tip location in the stomach. Partially visualized cholecystostomy tube and pacer wires. Tube was secured in place to the patient's nose with an adhesive dressing. 12 [place] nasogastric tube placed successfully with its tip in the stomach. Physical Examination:        Physical Exam   Constitutional: She is oriented to person, place, and time. No distress. Cachectic, lying in bed, less talkative than yesterday   Eyes: Conjunctivae are normal.   Neck: Neck supple. Cardiovascular: Regular rhythm, normal heart sounds and intact distal pulses. Pulmonary/Chest: Effort normal and breath sounds normal. No respiratory distress. She exhibits no tenderness. Abdominal: Soft. Bowel sounds are normal. There is no tenderness. + stool output in rectal tube   Musculoskeletal: She exhibits no edema or tenderness. Neurological: She is alert and oriented to person, place, and time.    Left sided hemiparesis  - chronic Skin: Skin is warm and dry. Vitals reviewed.     Vitals:    05/14/19 0600 05/14/19 0703 05/14/19 0704 05/14/19 0716   BP:  136/67     Pulse:  98     Resp:  18     Temp:  98.4 °F (36.9 °C)     TempSrc:  Oral     SpO2:   95% 100%   Weight: 110 lb 7.2 oz (50.1 kg)      Height:           Assessment:        Primary Problem  GI bleed    Active Hospital Problems    Diagnosis Date Noted    Small bowel obstruction ([facility] [address] [K56.609]     Anxiety disorder [F41.9]     Noncompliance [Z91.19]     Anemia [D64.9]     GI bleed [K92.2] 05/03/2019    Hemorrhage of rectum and anus [K62.5]     Centrilobular emphysema ([facility] [address] [J43.2] 04/30/2019    CRP elevated [R79.82]     Elevated procalcitonin [R79.89]     Bandemia [D72.825]     Cholecystitis [K81.9]     Abdominal distention [R14.0]     Elevated CEA [R97.0]     Elevated CA 19-9 level [R97.8]     Urinary retention [R33.9]     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]     Septic shock (HCC) [A41.9, R65.21]     Leukemoid reaction [D72.823]     Aspiration pneumonia of right lower lobe due to vomit ([facility] [address] [J69.0]     MRSA carrier [Z22.322]     Sepsis due to urinary tract infection ([facility] [address] [A41.9, N39.0] 04/11/2019    Cystitis [N30.90] 04/11/2019       Plan:        Acute Respiratory Failure r/o sepsis 2/2 aspiration pneumonia  - No intubation per code status  - Code status changed to [facility] [facility]    - ID following - Meropenem  - Pulm following  - Dulera, Xopenex, Symbicort, Atrovent  - Blood culture NGTD   - CBC today  - BMP tomorrow      Abdominal pain, Ileus vs SBO  - Surgery following - patient agreed to all surgeries to writer this AM, but could change mind   - Kept NPO by surgery this AM     Anemia, likely due to GI Bleed  - Patient refused EGD/colonoscopy   - Transfuse Hgb <7.0     Acute cholecystitis   - Surgery following - Dr. Soraya Peña  - Patient agreed to surgery on 5/13 PM (only for ashley, this morning agreed to all surgeries to writer, but could change mind)     Dispo  - PT/OT  - SW - discussing Lorel Brace for rehab (if proceeds with surgery), Jeremy Coleman MD  5/14/2019  8:51 AM   Attending Physician Statement  Patient seen and examined  I have discussed the care of the patient, including pertinent history and exam findings,  with the resident. I have reviewed the key elements of all parts of the encounter with the resident. I agree with the assessment, plan and orders as documented by the resident.     Ashley Kapoor

## 2019-05-14 NOTE — PROGRESS NOTES
RODGER spoke to Karan from [employer] [facility]. She said the appeal for [facility] is still under review and a decision will be made by 5-17-19. However, Karan stated that a pre-cert for lower level facility can be started is will likely be approved. RODGER continues to follow for [facility] after surgery is completed.

## 2019-05-14 NOTE — PROGRESS NOTES
Dr. Roma Layne rounding at bedside, plan of care discussed with him. Patient is still agreeable to have surgery on her gallbladder and the hernia. Dr. Roma Layne would like RN to talk to Dr. Darren Rosenthal and plan for surgery tomorrow. RN will perfect serve Dr. Darren Rosenthal and update him.

## 2019-05-14 NOTE — PROGRESS NOTES
Letty Jonas RN here from [facility] and talked to patient. She is agreeable to go with hospice AFTER surgery. She is still agreeable to surgery and talked to hospice nurse about being okay with having surgery. Kvng Corcoran will follow for discharge.

## 2019-05-14 NOTE — PROGRESS NOTES
RN received orders from Dr. Alexis Johnson for clear liquid diet and NPO after midnight except sips with meds. Discussed with patient that surgery would be tomorrow and she verbalized understanding.

## 2019-05-14 NOTE — PLAN OF CARE
Problem: Falls - Risk of:  Goal: Will remain free from falls  Description  Will remain free from falls  5/14/2019 1427 by Karol Smiley RN  Outcome: Met This Shift  Note:   Pt assessed as a fall risk this shift. Remains free from falls and accidental injury at this time. Fall precautions in place, including falling star sign and fall risk band on pt. Floor free from obstacles, and bed is locked and in lowest position. Adequate lighting provided. Pt encouraged to call before getting OOB for any need. Bed alarm activated. Will continue to monitor needs during hourly rounding, and reinforce education on use of call light. 5/14/2019 0348 by Matheus Garner RN  Outcome: Ongoing  Note:   Pt. Free of falls and injuries this shift. Problem: Risk for Impaired Skin Integrity  Goal: Tissue integrity - skin and mucous membranes  Description  Structural intactness and normal physiological function of skin and  mucous membranes. 5/14/2019 1427 by Karol Smiley RN  Outcome: Ongoing  Note:   Skin assessment performed. See head to toe assessment. Will continue to monitor. 5/14/2019 0348 by Matheus Garner RN  Outcome: Ongoing  Note:   Skin integrity improved/maintained this shift. See head to toe assessment. Problem: Infection, Septic Shock:  Goal: Will show no infection signs and symptoms  Description  Will show no infection signs and symptoms  5/14/2019 1427 by Karol Smiley RN  Outcome: Ongoing  5/14/2019 0348 by Matheus Garner RN  Outcome: Ongoing  Note:   Patient IV atb given during the shift. Patient vital signs are stable. We will continue to monitor.       Problem: Tissue Perfusion, Altered:  Goal: Circulatory function within specified parameters  Description  Circulatory function within specified parameters  5/14/2019 1427 by Karol Smiley RN  Outcome: Ongoing  5/14/2019 0348 by Matheus Garner RN  Outcome: Ongoing     Problem: Pain:  Goal: Pain level will decrease  Description  Pain level will decrease  5/14/2019 1427 by Libertad Mai RN  Outcome: Ongoing  Note:   Pt medicated with pain medication prn. Assessed all pain characteristics including level, type, location, frequency, and onset. Non-pharmacologic interventions offered to pt as well. Pt states pain is tolerable at this time. Will continue to monitor. 5/14/2019 0348 by Star Ro RN  Outcome: Ongoing  Note:   Adequate pain control achieved this shift. See MAR. Problem: Nutrition  Goal: Optimal nutrition therapy  Description       Outcome: Ongoing     Problem: Pain:  Goal: Pain level will decrease  Description  Pain level will decrease  5/14/2019 1427 by Libertad Mai RN  Outcome: Ongoing  Note:   Pt medicated with pain medication prn. Assessed all pain characteristics including level, type, location, frequency, and onset. Non-pharmacologic interventions offered to pt as well. Pt states pain is tolerable at this time. Will continue to monitor. 5/14/2019 0348 by Star Ro RN  Outcome: Ongoing  Note:   Adequate pain control achieved this shift. See MAR.       Problem: Nutrition  Goal: Optimal nutrition therapy  Description       Outcome: Ongoing

## 2019-05-14 NOTE — PROGRESS NOTES
Pulmonary Progress Note  [facility]      Patient - Mariela Worley,  Age - 77 y.o.    - 1950      Room Number - 2066/2066-01   N -  [id number]   [facility]  Date of Admission -  2019  2:48 PM        Consulting Mehnaz Garcia MD  Primary Care Physician - Obie Ferreira, DO     SUBJECTIVE   Feels fine, on 2 L O2   denies any fever or chills, no chest pain  No short of breath cough or wheezing    OBJECTIVE   VITALS    height is 5' (1.524 m) and weight is 110 lb 7.2 oz (50.1 kg). Her oral temperature is 98.4 °F (36.9 °C). Her blood pressure is 127/79 and her pulse is 86. Her respiration is 18 and oxygen saturation is 99%. Body mass index is 21.57 kg/m². Temperature Range: Temp: 98.4 °F (36.9 °C) Temp  Av.2 °F (36.8 °C)  Min: 97.8 °F (36.6 °C)  Max: 98.4 °F (36.9 °C)  BP Range:  Systolic (50DNW), FQP:004 , Min:100 , PMR:608     Diastolic (99HPY), CWU:11, Min:67, Max:81    Pulse Range: Pulse  Av  Min: 86  Max: 101  Respiration Range: Resp  Av  Min: 18  Max: 18  Current Pulse Ox[de-identified]  SpO2: 99 %  24HR Pulse Ox Range:  SpO2  Av.7 %  Min: 95 %  Max: 100 %  Oxygen Amount and Delivery: O2 Flow Rate (L/min): 2 L/min    Wt Readings from Last 3 Encounters:   19 110 lb 7.2 oz (50.1 kg)   19 89 lb (40.4 kg)   19 94 lb 1.6 oz (42.7 kg)       I/O (24 Hours)    Intake/Output Summary (Last 24 hours) at 2019 1425  Last data filed at 2019 1136  Gross per 24 hour   Intake 2861 ml   Output 400 ml   Net 2461 ml       EXAM     General Appearance  somnolent, arousable, in no acute distress  HEENT - normocephalic, atraumatic.   Neck - Supple,  trachea midline no JVD  Lungs - coarse, no wheezing or distress  Cardiovascular - Heart sounds are normal.  Regular rate and rhythm   Abdomen - Soft, nontender, nondistended, no guarding  Neurologic -  comfortable, following commands  Skin - No bruising or bleeding  Extremities - No clubbing, cyanosis, edema    MEDS      metoprolol  5 mg Intravenous Q12H    pantoprazole  40 mg Intravenous Daily    And    sodium chloride (PF)  10 mL Intravenous Daily    alteplase  1 mg Intracatheter Once    levalbuterol  1.25 mg Nebulization Q4H    ipratropium  0.5 mg Nebulization Q4H    heparin (porcine)  5,000 Units Subcutaneous BID    meropenem  1 g Intravenous Q8H    mometasone-formoterol  2 puff Inhalation BID    [Held by provider] metoprolol tartrate  12.5 mg Oral BID    magnesium sulfate  1 g Intravenous Once    insulin lispro  0-12 Units Subcutaneous Q6H    venlafaxine  37.5 mg Oral TID    aspirin  81 mg Oral Daily    sodium chloride flush  10 mL Intravenous 2 times per day      dextrose 5% and 0.45% NaCl with KCl 20 mEq 100 mL/hr at 05/14/19 0003    dextrose       haloperidol lactate, diatrizoate meglumine-sodium, sodium chloride, sodium chloride flush, morphine, LORazepam, ondansetron, zolpidem, sodium chloride flush, hydrOXYzine, metoprolol, sodium chloride nebulizer, fentanNYL, oxyCODONE-acetaminophen, magnesium sulfate, sodium phosphate IVPB **OR** sodium phosphate IVPB, potassium chloride **OR** potassium alternative oral replacement **OR** potassium chloride, glucose, dextrose, glucagon (rDNA), dextrose, milk and molasses, sodium chloride flush, acetaminophen    LABS   CBC   Recent Labs     05/14/19  1006   WBC 12.4*   HGB 9.4*   HCT 29.5*   MCV 85.4        BMP:   Lab Results   Component Value Date     05/13/2019    K 3.6 05/13/2019     05/13/2019    CO2 20 05/13/2019    BUN 25 05/13/2019    LABALBU 1.9 05/11/2019    LABALBU 4.6 05/17/2012    CREATININE <0.40 05/13/2019    CALCIUM 7.4 05/13/2019    GFRAA CANNOT BE CALCULATED 05/13/2019    LABGLOM CANNOT BE CALCULATED 05/13/2019     ABGs:  Lab Results   Component Value Date    PHART 7.370 05/10/2019    PO2ART 68.2 05/10/2019    CEK3GEQ 37.1 05/10/2019 Lab Results   Component Value Date    MODE NOT REPORTED 05/10/2019     Ionized Calcium:  No results found for: IONCA  Magnesium:    Lab Results   Component Value Date    MG 1.9 05/13/2019      Phosphorus:    Lab Results   Component Value Date    PHOS 2.6 05/13/2019        LIVER PROFILE   No results for input(s): AST, ALT, LIPASE, BILIDIR, BILITOT, ALKPHOS in the last 72 hours. Invalid input(s): AMYLASE,  ALB  INR   Recent Labs     05/13/19  0655   INR 1.3     PTT No results for input(s): APTT in the last 72 hours. BNP No results for input(s): BNP in the last 72 hours.     RADIOLOGY     (See actual reports for details)    ASSESSMENT/PLAN   Principal Problem:    GI bleed  Active Problems:    Sepsis due to urinary tract infection (HCC) E. coli    Septic shock (HCC) improved    Leukemoid reaction    Aspiration pneumonia of right lower lobe due to vomit ([address]    Cholecystitis status post cholecystostomy tube placement on 4/22    Centrilobular emphysema (HCC)    Hemorrhage of rectum and anus    Anemia/chronic    Anxiety disorder/depression  Acute respiratory failure with hypoxia extubated 4/19  Severe COPD  History of CVA with left hemiparesis    Plan:  O2, bronchodilators  On heparin subcu  Antibiotic per ID  for surgery tomorrow  Discussed with staff      Electronically signed by Mary Ayala MD on 5/14/2019 at 2:25 PM

## 2019-05-14 NOTE — PLAN OF CARE
Problem: Risk for Impaired Skin Integrity  Goal: Tissue integrity - skin and mucous membranes  Description  Structural intactness and normal physiological function of skin and  mucous membranes. Outcome: Ongoing  Note:   Skin integrity improved/maintained this shift. See head to toe assessment. Problem: Falls - Risk of:  Goal: Will remain free from falls  Description  Will remain free from falls  5/14/2019 0348 by Matheus Garner RN  Outcome: Ongoing  Note:   Pt. Free of falls and injuries this shift. Problem: Infection, Septic Shock:  Goal: Will show no infection signs and symptoms  Description  Will show no infection signs and symptoms  Outcome: Ongoing  Note:   Patient IV atb given during the shift. Patient vital signs are stable. We will continue to monitor. Problem: Pain:  Goal: Pain level will decrease  Description  Pain level will decrease  Outcome: Ongoing  Note:   Adequate pain control achieved this shift. See MAR. Problem: Tissue Perfusion, Altered:  Goal: Circulatory function within specified parameters  Description  Circulatory function within specified parameters  Outcome: Ongoing     Problem: Pain:  Goal: Pain level will decrease  Description  Pain level will decrease  Outcome: Ongoing  Note:   Adequate pain control achieved this shift. See MAR.

## 2019-05-14 NOTE — FLOWSHEET NOTE
05/14/19 1403   Encounter Summary   Services provided to: Patient   Referral/Consult From: Palliative Care   Continue Visiting   (5/14/19)   Complexity of Encounter Moderate   Length of Encounter 15 minutes   Spiritual Assessment Completed Yes   Routine   Type Follow up   Spiritual/Catholic   Type Spiritual support   Assessment Approachable;Helplessness   Intervention Nurtured hope;Prayer;Sustaining presence/ Ministry of presence; Discussed illness/injury and it's impact   Outcome Expressed gratitude;Engaged in conversation;Coping; Hopeful;Receptive

## 2019-05-14 NOTE — PROGRESS NOTES
Dr. Tony Lieberman called to discuss patient's case. He asked why patient was NPO and said that he told the nurse yesterday that he was not doing the surgery unless patient was willing to have the bowel obstruction fixed as well and that if she did agree he wasn't able to get patient in 5/14 for surgery. While on the phone he stated to leave her NPO and he would look into it.

## 2019-05-14 NOTE — PROGRESS NOTES
General Surgery Progress Note            PATIENT NAME: Suad Valentino     TODAY'S DATE: 5/14/2019, 1:24 PM      SUBJECTIVE / OBJECTIVE:      VITALS:  /79   Pulse 86   Temp 98.4 °F (36.9 °C) (Oral)   Resp 18   Ht 5' (1.524 m)   Wt 110 lb 7.2 oz (50.1 kg)   SpO2 99%   BMI 21.57 kg/m²     Patient seen and examined  Awake. Alert. Non-toxic  Abdomen soft, ND.  Mildly tender  1+ peripheral edema    INTAKE/OUTPUT:      Intake/Output Summary (Last 24 hours) at 5/14/2019 1324  Last data filed at 5/14/2019 0836  Gross per 24 hour   Intake 2851 ml   Output 400 ml   Net 2451 ml       CBC with Differential:    Lab Results   Component Value Date    WBC 12.4 05/14/2019    RBC 3.45 05/14/2019    RBC 3.66 05/23/2012    HGB 9.4 05/14/2019    HCT 29.5 05/14/2019     05/14/2019     05/23/2012    MCV 85.4 05/14/2019    MCH 27.3 05/14/2019    MCHC 32.0 05/14/2019    RDW 16.5 05/14/2019    METASPCT 1 05/14/2019    LYMPHOPCT 7 05/14/2019    MONOPCT 8 05/14/2019    MYELOPCT 1 05/07/2019    BASOPCT 0 05/14/2019    MONOSABS 0.99 05/14/2019    LYMPHSABS 0.87 05/14/2019    EOSABS 0.00 05/14/2019    BASOSABS 0.00 05/14/2019    DIFFTYPE NOT REPORTED 05/14/2019     CMP:    Lab Results   Component Value Date     05/13/2019    K 3.6 05/13/2019     05/13/2019    CO2 20 05/13/2019    BUN 25 05/13/2019    CREATININE <0.40 05/13/2019    GFRAA CANNOT BE CALCULATED 05/13/2019    LABGLOM CANNOT BE CALCULATED 05/13/2019    GLUCOSE 95 05/13/2019    GLUCOSE 87 05/21/2012    PROT 5.9 05/11/2019    LABALBU 1.9 05/11/2019    LABALBU 4.6 05/17/2012    CALCIUM 7.4 05/13/2019    BILITOT 0.38 05/11/2019    ALKPHOS 196 05/11/2019    AST 19 05/11/2019    ALT 18 05/11/2019         ASSESSMENT / PLAN:     Principal Problem:    GI bleed  Active Problems:    Sepsis due to urinary tract infection (HCC)    Cystitis    Emphysematous cystitis    Septic shock (HCC)    Leukemoid reaction    Aspiration pneumonia of right lower lobe due to vomit (HCC)    MRSA carrier    Urinary retention    Abdominal distention    Elevated CEA    Elevated CA 19-9 level    Cholecystitis    CRP elevated    Elevated procalcitonin    Bandemia    Centrilobular emphysema (HCC)    Hemorrhage of rectum and anus    Anemia    Small bowel obstruction (HCC)    Anxiety disorder    Noncompliance  Resolved Problems:    * No resolved hospital problems. *    Chronic cholecystitis  ?  SBO  Plan diagnostic laparoscopy, possible laparotomy, cholecystectomy tomorrow        Nancy Jacob, [address]

## 2019-05-14 NOTE — PROGRESS NOTES
Physical Therapy  Facility/Department: [facility] MED SURG  Daily Treatment Note  NAME: Loraine Carson  : 1950  MRN: [id number]    Date of Service: 2019    Discharge Recommendations:  ECF with PT        Patient Diagnosis(es): The primary encounter diagnosis was Urinary retention. Diagnoses of Emphysematous cystitis, Septicemia ([address] and Small bowel obstruction ([address] were also pertinent to this visit. has a past medical history of Abnormal computed tomography of cervical spine, CVA (cerebral vascular accident) ([address] GERD (gastroesophageal reflux disease), Hypertension, Paraproteinemia, and Weight loss. has a past surgical history that includes pacemaker placement (2011); intubation (2019); and Upper gastrointestinal endoscopy (N/A, 2019). Restrictions  Restrictions/Precautions  Restrictions/Precautions: Fall Risk  Implants present? : Pacemaker  Position Activity Restriction  Other position/activity restrictions: LLE cast is removed   Subjective   Subjective  Subjective: Pt refused ther ex when therapist introduce herself. Much encouragement to allow therapist to ex both L/E bedside. General Comment  Comments: Pt report feeling nauseated, pt schedule for surgery today.   Pain Assessment  Pain Assessment: 0-10  Pain Level: 1  Pain Type: Acute pain  Pain Location: Abdomen  Pain Orientation: Right;Left       Orientation  Orientation  Overall Orientation Status: Within Functional Limits  Cognition      Objective      Transfers  Sit to Stand: Unable to assess  Stand to sit: Unable to assess  Ambulation  Ambulation?: No  Stairs/Curb  Stairs?: No        Other exercises  Other exercises?: Yes  Other exercises 1: A/AROM bilateral LEs x15  Other exercises 2: positioned for comfort and decreased risk soft tissue breakdown , nursing notified  Other exercises 3: Education on supine therex to maintain strength & help prevent DVTs in BLE                         Assessment   Body structures, Functions, Activity limitations: Decreased functional mobility ; Decreased ADL status; Decreased strength; Increased Pain  Assessment: Pt would benefit from additional PT to increase strength, endurance, and indpendence with functional mobility. Treatment Diagnosis: weakness all 4's difficulty walking  Specific instructions for Next Treatment: progress to mobility as able  Prognosis: Good  Patient Education: POC, bed mobility, breathing technique.    REQUIRES PT FOLLOW UP: Yes  Activity Tolerance  Activity Tolerance: Patient limited by pain  Activity Tolerance: Pt refuses further PROM      G-Code     OutComes Score                                                  AM-PAC Score             Goals  Short term goals  Time Frame for Short term goals: 10 visits  Short term goal 1: improve strength RUE/RLE to 4/5 to assist in bed mobility and transfers  Short term goal 2: improve bed mobility to minx1  Short term goal 3: improve transfers to minx1  Short term goal 4: progress to Gait and/or use of wheelchair for mobility  Patient Goals   Patient goals : return home    Plan    Plan  Times per week: 5-7x/wk  Times per day: Daily  Specific instructions for Next Treatment: progress to mobility as able  Current Treatment Recommendations: ROM, Strengthening, Functional Mobility Training, Transfer Training  Plan Comment: to continue PT per POC  Safety Devices  Type of devices: Call light within reach, Left in bed  Restraints  Initially in place: No  Restraints: B wrist restraint     Therapy Time   Individual Concurrent Group Co-treatment   Time In 1044         Time Out 1056         Minutes [address], PTA   Electronically signed by Sharon Redd PTA on 5/14/2019 at 5:39 PM

## 2019-05-14 NOTE — CARE COORDINATION
ONGOING DISCHARGE PLAN:    Plan remains for LSW to continue to follow for DC to Possible [facility], [facility], (Per Notes, appeal is still under review & decision to be made by 5/17/19). VS, SNF, [facility]. As of yesterday, Pt. Does not want Hospice & has now agreed to surgery. Per Surgery Notes:  Chronic cholecystitis  ? SBO  Plan diagnostic laparoscopy, possible laparotomy, cholecystectomy tomorrow    Surgery is planned for [place] at [facility]. Pt remains on IV Merrem, ID continues to follow. Will continue to follow along w/ LSW for additional discharge needs.     Electronically signed by Millicent Jernigan RN on 5/14/2019 at 3:38 PM

## 2019-05-15 ENCOUNTER — APPOINTMENT (OUTPATIENT)
Dept: GENERAL RADIOLOGY | Age: 69
DRG: 853 | End: 2019-05-15
Payer: COMMERCIAL

## 2019-05-15 ENCOUNTER — ANESTHESIA (OUTPATIENT)
Dept: OPERATING ROOM | Age: 69
DRG: 853 | End: 2019-05-15
Payer: COMMERCIAL

## 2019-05-15 ENCOUNTER — ANESTHESIA EVENT (OUTPATIENT)
Dept: OPERATING ROOM | Age: 69
DRG: 853 | End: 2019-05-15
Payer: COMMERCIAL

## 2019-05-15 VITALS — SYSTOLIC BLOOD PRESSURE: 144 MMHG | DIASTOLIC BLOOD PRESSURE: 70 MMHG | TEMPERATURE: 96.6 F | OXYGEN SATURATION: 100 %

## 2019-05-15 LAB
ABSOLUTE BANDS #: 0.49 K/UL (ref 0–1)
ABSOLUTE EOS #: 0 K/UL (ref 0–0.4)
ABSOLUTE IMMATURE GRANULOCYTE: ABNORMAL K/UL (ref 0–0.3)
ABSOLUTE LYMPH #: 0.69 K/UL (ref 1–4.8)
ABSOLUTE MONO #: 0.78 K/UL (ref 0.1–1.3)
ALLEN TEST: ABNORMAL
ANION GAP SERPL CALCULATED.3IONS-SCNC: 11 MMOL/L (ref 9–17)
BANDS: 5 % (ref 0–10)
BASOPHILS # BLD: 0 % (ref 0–2)
BASOPHILS ABSOLUTE: 0 K/UL (ref 0–0.2)
BILIRUBIN URINE: NEGATIVE
BUN BLDV-MCNC: 5 MG/DL (ref 8–23)
BUN/CREAT BLD: ABNORMAL (ref 9–20)
CALCIUM SERPL-MCNC: 7 MG/DL (ref 8.6–10.4)
CARBOXYHEMOGLOBIN: 1.1 % (ref 0–5)
CHLORIDE BLD-SCNC: 103 MMOL/L (ref 98–107)
CO2: 22 MMOL/L (ref 20–31)
COLOR: YELLOW
COMMENT UA: NORMAL
CREAT SERPL-MCNC: <0.4 MG/DL (ref 0.5–0.9)
DIFFERENTIAL TYPE: ABNORMAL
EKG ATRIAL RATE: 104 BPM
EKG ATRIAL RATE: 112 BPM
EKG ATRIAL RATE: 88 BPM
EKG ATRIAL RATE: 94 BPM
EKG ATRIAL RATE: 95 BPM
EKG ATRIAL RATE: 99 BPM
EKG P AXIS: 60 DEGREES
EKG P AXIS: 63 DEGREES
EKG P AXIS: 65 DEGREES
EKG P AXIS: 65 DEGREES
EKG P AXIS: 69 DEGREES
EKG P AXIS: 74 DEGREES
EKG P-R INTERVAL: 130 MS
EKG P-R INTERVAL: 132 MS
EKG P-R INTERVAL: 136 MS
EKG P-R INTERVAL: 138 MS
EKG P-R INTERVAL: 142 MS
EKG P-R INTERVAL: 144 MS
EKG Q-T INTERVAL: 324 MS
EKG Q-T INTERVAL: 346 MS
EKG Q-T INTERVAL: 346 MS
EKG Q-T INTERVAL: 350 MS
EKG Q-T INTERVAL: 362 MS
EKG Q-T INTERVAL: 368 MS
EKG QRS DURATION: 66 MS
EKG QRS DURATION: 70 MS
EKG QRS DURATION: 72 MS
EKG QRS DURATION: 78 MS
EKG QTC CALCULATION (BAZETT): 426 MS
EKG QTC CALCULATION (BAZETT): 432 MS
EKG QTC CALCULATION (BAZETT): 438 MS
EKG QTC CALCULATION (BAZETT): 439 MS
EKG QTC CALCULATION (BAZETT): 444 MS
EKG QTC CALCULATION (BAZETT): 502 MS
EKG R AXIS: -16 DEGREES
EKG R AXIS: -5 DEGREES
EKG R AXIS: 13 DEGREES
EKG R AXIS: 16 DEGREES
EKG R AXIS: 18 DEGREES
EKG R AXIS: 9 DEGREES
EKG T AXIS: 43 DEGREES
EKG T AXIS: 51 DEGREES
EKG T AXIS: 62 DEGREES
EKG T AXIS: 64 DEGREES
EKG T AXIS: 67 DEGREES
EKG T AXIS: 87 DEGREES
EKG VENTRICULAR RATE: 104 BPM
EKG VENTRICULAR RATE: 112 BPM
EKG VENTRICULAR RATE: 88 BPM
EKG VENTRICULAR RATE: 94 BPM
EKG VENTRICULAR RATE: 95 BPM
EKG VENTRICULAR RATE: 99 BPM
EOSINOPHILS RELATIVE PERCENT: 0 % (ref 0–4)
FIO2: 50
GFR AFRICAN AMERICAN: ABNORMAL ML/MIN
GFR NON-AFRICAN AMERICAN: ABNORMAL ML/MIN
GFR SERPL CREATININE-BSD FRML MDRD: ABNORMAL ML/MIN/{1.73_M2}
GFR SERPL CREATININE-BSD FRML MDRD: ABNORMAL ML/MIN/{1.73_M2}
GLUCOSE BLD-MCNC: 91 MG/DL (ref 65–105)
GLUCOSE BLD-MCNC: 92 MG/DL (ref 70–99)
GLUCOSE URINE: NEGATIVE
HCO3 ARTERIAL: 20.9 MMOL/L (ref 22–26)
HCT VFR BLD CALC: 27.7 % (ref 36–46)
HEMOGLOBIN: 8.8 G/DL (ref 12–16)
IMMATURE GRANULOCYTES: ABNORMAL %
INR BLD: 1.2
KETONES, URINE: NEGATIVE
LEUKOCYTE ESTERASE, URINE: NEGATIVE
LYMPHOCYTES # BLD: 7 % (ref 24–44)
MAGNESIUM: 1.5 MG/DL (ref 1.6–2.6)
MCH RBC QN AUTO: 27.6 PG (ref 26–34)
MCHC RBC AUTO-ENTMCNC: 31.8 G/DL (ref 31–37)
MCV RBC AUTO: 86.8 FL (ref 80–100)
METAMYELOCYTES ABSOLUTE COUNT: 0.2 K/UL
METAMYELOCYTES: 2 %
METHEMOGLOBIN: 0.5 % (ref 0–1.9)
MODE: ABNORMAL
MONOCYTES # BLD: 8 % (ref 1–7)
MORPHOLOGY: ABNORMAL
MORPHOLOGY: ABNORMAL
NEGATIVE BASE EXCESS, ART: 5 MMOL/L (ref 0–2)
NITRITE, URINE: NEGATIVE
NOTIFICATION TIME: ABNORMAL
NOTIFICATION: ABNORMAL
NRBC AUTOMATED: ABNORMAL PER 100 WBC
O2 DEVICE/FLOW/%: ABNORMAL
O2 SAT, ARTERIAL: 97.5 % (ref 95–98)
OXYHEMOGLOBIN: ABNORMAL % (ref 95–98)
PARTIAL THROMBOPLASTIN TIME: 32.3 SEC (ref 24–36)
PATIENT TEMP: 37
PCO2 ARTERIAL: 39.7 MMHG (ref 35–45)
PCO2, ART, TEMP ADJ: ABNORMAL (ref 35–45)
PDW BLD-RTO: 16.9 % (ref 11.5–14.9)
PEEP/CPAP: 5
PH ARTERIAL: 7.33 (ref 7.35–7.45)
PH UA: 6.5 (ref 5–8)
PH, ART, TEMP ADJ: ABNORMAL (ref 7.35–7.45)
PLATELET # BLD: 205 K/UL (ref 150–450)
PLATELET ESTIMATE: ABNORMAL
PMV BLD AUTO: 8.9 FL (ref 6–12)
PO2 ARTERIAL: 115 MMHG (ref 80–100)
PO2, ART, TEMP ADJ: ABNORMAL MMHG (ref 80–100)
POSITIVE BASE EXCESS, ART: ABNORMAL MMOL/L (ref 0–2)
POTASSIUM SERPL-SCNC: 3.5 MMOL/L (ref 3.7–5.3)
PROTEIN UA: NEGATIVE
PROTHROMBIN TIME: 15.6 SEC (ref 11.8–14.6)
PSV: ABNORMAL
PT. POSITION: ABNORMAL
RBC # BLD: 3.19 M/UL (ref 4–5.2)
RBC # BLD: ABNORMAL 10*6/UL
RESPIRATORY RATE: 20
SAMPLE SITE: ABNORMAL
SEG NEUTROPHILS: 78 % (ref 36–66)
SEGMENTED NEUTROPHILS ABSOLUTE COUNT: 7.64 K/UL (ref 1.3–9.1)
SET RATE: 16
SODIUM BLD-SCNC: 136 MMOL/L (ref 135–144)
SPECIFIC GRAVITY UA: 1.01 (ref 1–1.03)
TEXT FOR RESPIRATORY: ABNORMAL
TOTAL HB: ABNORMAL G/DL (ref 12–16)
TOTAL RATE: 20
TURBIDITY: CLEAR
URINE HGB: NEGATIVE
UROBILINOGEN, URINE: NORMAL
VT: 500
WBC # BLD: 9.8 K/UL (ref 3.5–11)
WBC # BLD: ABNORMAL 10*3/UL

## 2019-05-15 PROCEDURE — 2500000003 HC RX 250 WO HCPCS: Performed by: STUDENT IN AN ORGANIZED HEALTH CARE EDUCATION/TRAINING PROGRAM

## 2019-05-15 PROCEDURE — 2580000003 HC RX 258: Performed by: SURGERY

## 2019-05-15 PROCEDURE — 86901 BLOOD TYPING SEROLOGIC RH(D): CPT

## 2019-05-15 PROCEDURE — C9113 INJ PANTOPRAZOLE SODIUM, VIA: HCPCS | Performed by: SURGERY

## 2019-05-15 PROCEDURE — 85610 PROTHROMBIN TIME: CPT

## 2019-05-15 PROCEDURE — 3700000001 HC ADD 15 MINUTES (ANESTHESIA): Performed by: SURGERY

## 2019-05-15 PROCEDURE — 6360000002 HC RX W HCPCS: Performed by: INTERNAL MEDICINE

## 2019-05-15 PROCEDURE — 2000000000 HC ICU R&B

## 2019-05-15 PROCEDURE — 0FT40ZZ RESECTION OF GALLBLADDER, OPEN APPROACH: ICD-10-PCS | Performed by: SURGERY

## 2019-05-15 PROCEDURE — 0BH17EZ INSERTION OF ENDOTRACHEAL AIRWAY INTO TRACHEA, VIA NATURAL OR ARTIFICIAL OPENING: ICD-10-PCS | Performed by: INTERNAL MEDICINE

## 2019-05-15 PROCEDURE — 6360000002 HC RX W HCPCS: Performed by: SPECIALIST

## 2019-05-15 PROCEDURE — 2580000003 HC RX 258: Performed by: ANESTHESIOLOGY

## 2019-05-15 PROCEDURE — 2580000003 HC RX 258: Performed by: STUDENT IN AN ORGANIZED HEALTH CARE EDUCATION/TRAINING PROGRAM

## 2019-05-15 PROCEDURE — 94002 VENT MGMT INPAT INIT DAY: CPT

## 2019-05-15 PROCEDURE — 2500000003 HC RX 250 WO HCPCS: Performed by: SPECIALIST

## 2019-05-15 PROCEDURE — 83735 ASSAY OF MAGNESIUM: CPT

## 2019-05-15 PROCEDURE — 2720000010 HC SURG SUPPLY STERILE: Performed by: SURGERY

## 2019-05-15 PROCEDURE — 0WJP4ZZ INSPECTION OF GASTROINTESTINAL TRACT, PERCUTANEOUS ENDOSCOPIC APPROACH: ICD-10-PCS | Performed by: SURGERY

## 2019-05-15 PROCEDURE — 6360000002 HC RX W HCPCS: Performed by: SURGERY

## 2019-05-15 PROCEDURE — 2580000003 HC RX 258: Performed by: INTERNAL MEDICINE

## 2019-05-15 PROCEDURE — 3700000000 HC ANESTHESIA ATTENDED CARE: Performed by: SURGERY

## 2019-05-15 PROCEDURE — 82805 BLOOD GASES W/O2 SATURATION: CPT

## 2019-05-15 PROCEDURE — C1765 ADHESION BARRIER: HCPCS | Performed by: SURGERY

## 2019-05-15 PROCEDURE — 2500000003 HC RX 250 WO HCPCS: Performed by: ANESTHESIOLOGY

## 2019-05-15 PROCEDURE — 80048 BASIC METABOLIC PNL TOTAL CA: CPT

## 2019-05-15 PROCEDURE — 94640 AIRWAY INHALATION TREATMENT: CPT

## 2019-05-15 PROCEDURE — 94770 HC ETCO2 MONITOR DAILY: CPT

## 2019-05-15 PROCEDURE — 0DB80ZZ EXCISION OF SMALL INTESTINE, OPEN APPROACH: ICD-10-PCS | Performed by: SURGERY

## 2019-05-15 PROCEDURE — 2709999900 HC NON-CHARGEABLE SUPPLY: Performed by: SURGERY

## 2019-05-15 PROCEDURE — 2580000003 HC RX 258: Performed by: NURSE ANESTHETIST, CERTIFIED REGISTERED

## 2019-05-15 PROCEDURE — C9113 INJ PANTOPRAZOLE SODIUM, VIA: HCPCS | Performed by: STUDENT IN AN ORGANIZED HEALTH CARE EDUCATION/TRAINING PROGRAM

## 2019-05-15 PROCEDURE — 85025 COMPLETE CBC W/AUTO DIFF WBC: CPT

## 2019-05-15 PROCEDURE — 36415 COLL VENOUS BLD VENIPUNCTURE: CPT

## 2019-05-15 PROCEDURE — 6360000002 HC RX W HCPCS: Performed by: STUDENT IN AN ORGANIZED HEALTH CARE EDUCATION/TRAINING PROGRAM

## 2019-05-15 PROCEDURE — 86900 BLOOD TYPING SEROLOGIC ABO: CPT

## 2019-05-15 PROCEDURE — P9041 ALBUMIN (HUMAN),5%, 50ML: HCPCS | Performed by: SPECIALIST

## 2019-05-15 PROCEDURE — 5A1955Z RESPIRATORY VENTILATION, GREATER THAN 96 CONSECUTIVE HOURS: ICD-10-PCS | Performed by: INTERNAL MEDICINE

## 2019-05-15 PROCEDURE — 6360000002 HC RX W HCPCS: Performed by: ANESTHESIOLOGY

## 2019-05-15 PROCEDURE — 94761 N-INVAS EAR/PLS OXIMETRY MLT: CPT

## 2019-05-15 PROCEDURE — 88341 IMHCHEM/IMCYTCHM EA ADD ANTB: CPT

## 2019-05-15 PROCEDURE — 88307 TISSUE EXAM BY PATHOLOGIST: CPT

## 2019-05-15 PROCEDURE — 7100000000 HC PACU RECOVERY - FIRST 15 MIN: Performed by: SURGERY

## 2019-05-15 PROCEDURE — 81003 URINALYSIS AUTO W/O SCOPE: CPT

## 2019-05-15 PROCEDURE — 85730 THROMBOPLASTIN TIME PARTIAL: CPT

## 2019-05-15 PROCEDURE — 2500000003 HC RX 250 WO HCPCS: Performed by: SURGERY

## 2019-05-15 PROCEDURE — 0DTF0ZZ RESECTION OF RIGHT LARGE INTESTINE, OPEN APPROACH: ICD-10-PCS | Performed by: SURGERY

## 2019-05-15 PROCEDURE — 0DNL0ZZ RELEASE TRANSVERSE COLON, OPEN APPROACH: ICD-10-PCS | Performed by: SURGERY

## 2019-05-15 PROCEDURE — 3600000004 HC SURGERY LEVEL 4 BASE: Performed by: SURGERY

## 2019-05-15 PROCEDURE — 2700000000 HC OXYGEN THERAPY PER DAY

## 2019-05-15 PROCEDURE — 3600000014 HC SURGERY LEVEL 4 ADDTL 15MIN: Performed by: SURGERY

## 2019-05-15 PROCEDURE — 82947 ASSAY GLUCOSE BLOOD QUANT: CPT

## 2019-05-15 PROCEDURE — 86920 COMPATIBILITY TEST SPIN: CPT

## 2019-05-15 PROCEDURE — 36600 WITHDRAWAL OF ARTERIAL BLOOD: CPT

## 2019-05-15 PROCEDURE — 88342 IMHCHEM/IMCYTCHM 1ST ANTB: CPT

## 2019-05-15 PROCEDURE — 71045 X-RAY EXAM CHEST 1 VIEW: CPT

## 2019-05-15 PROCEDURE — 86850 RBC ANTIBODY SCREEN: CPT

## 2019-05-15 PROCEDURE — 88304 TISSUE EXAM BY PATHOLOGIST: CPT

## 2019-05-15 PROCEDURE — 7100000001 HC PACU RECOVERY - ADDTL 15 MIN: Performed by: SURGERY

## 2019-05-15 PROCEDURE — 6370000000 HC RX 637 (ALT 250 FOR IP): Performed by: INTERNAL MEDICINE

## 2019-05-15 RX ORDER — EPHEDRINE SULFATE/0.9% NACL/PF 50 MG/5 ML
SYRINGE (ML) INTRAVENOUS PRN
Status: DISCONTINUED | OUTPATIENT
Start: 2019-05-15 | End: 2019-05-15 | Stop reason: SDUPTHER

## 2019-05-15 RX ORDER — DIPHENHYDRAMINE HYDROCHLORIDE 50 MG/ML
12.5 INJECTION INTRAMUSCULAR; INTRAVENOUS
Status: DISCONTINUED | OUTPATIENT
Start: 2019-05-15 | End: 2019-05-15 | Stop reason: HOSPADM

## 2019-05-15 RX ORDER — SODIUM CHLORIDE 0.9 % (FLUSH) 0.9 %
10 SYRINGE (ML) INJECTION EVERY 12 HOURS SCHEDULED
Status: DISCONTINUED | OUTPATIENT
Start: 2019-05-15 | End: 2019-05-31 | Stop reason: HOSPADM

## 2019-05-15 RX ORDER — HYDROCODONE BITARTRATE AND ACETAMINOPHEN 5; 325 MG/1; MG/1
1 TABLET ORAL PRN
Status: DISCONTINUED | OUTPATIENT
Start: 2019-05-15 | End: 2019-05-15 | Stop reason: HOSPADM

## 2019-05-15 RX ORDER — ONDANSETRON 2 MG/ML
4 INJECTION INTRAMUSCULAR; INTRAVENOUS EVERY 4 HOURS PRN
Status: DISCONTINUED | OUTPATIENT
Start: 2019-05-15 | End: 2019-05-31 | Stop reason: HOSPADM

## 2019-05-15 RX ORDER — PHENYLEPHRINE HYDROCHLORIDE 10 MG/ML
INJECTION INTRAVENOUS PRN
Status: DISCONTINUED | OUTPATIENT
Start: 2019-05-15 | End: 2019-05-15 | Stop reason: SDUPTHER

## 2019-05-15 RX ORDER — LABETALOL 20 MG/4 ML (5 MG/ML) INTRAVENOUS SYRINGE
5 EVERY 10 MIN PRN
Status: DISCONTINUED | OUTPATIENT
Start: 2019-05-15 | End: 2019-05-15 | Stop reason: HOSPADM

## 2019-05-15 RX ORDER — CLINDAMYCIN PHOSPHATE 150 MG/ML
INJECTION, SOLUTION INTRAVENOUS
Status: DISCONTINUED
Start: 2019-05-15 | End: 2019-05-15

## 2019-05-15 RX ORDER — CLINDAMYCIN PHOSPHATE 900 MG/50ML
900 INJECTION INTRAVENOUS ONCE
Status: COMPLETED | OUTPATIENT
Start: 2019-05-15 | End: 2019-05-15

## 2019-05-15 RX ORDER — SODIUM CHLORIDE 9 MG/ML
INJECTION, SOLUTION INTRAVENOUS CONTINUOUS PRN
Status: DISCONTINUED | OUTPATIENT
Start: 2019-05-15 | End: 2019-05-15 | Stop reason: SDUPTHER

## 2019-05-15 RX ORDER — IPRATROPIUM BROMIDE AND ALBUTEROL SULFATE 2.5; .5 MG/3ML; MG/3ML
1 SOLUTION RESPIRATORY (INHALATION)
Status: DISCONTINUED | OUTPATIENT
Start: 2019-05-15 | End: 2019-05-20

## 2019-05-15 RX ORDER — ROCURONIUM BROMIDE 10 MG/ML
INJECTION, SOLUTION INTRAVENOUS PRN
Status: DISCONTINUED | OUTPATIENT
Start: 2019-05-15 | End: 2019-05-15 | Stop reason: SDUPTHER

## 2019-05-15 RX ORDER — SODIUM CHLORIDE 0.9 % (FLUSH) 0.9 %
10 SYRINGE (ML) INJECTION PRN
Status: DISCONTINUED | OUTPATIENT
Start: 2019-05-15 | End: 2019-05-31 | Stop reason: HOSPADM

## 2019-05-15 RX ORDER — POTASSIUM CHLORIDE 7.45 MG/ML
10 INJECTION INTRAVENOUS PRN
Status: DISCONTINUED | OUTPATIENT
Start: 2019-05-15 | End: 2019-05-31 | Stop reason: HOSPADM

## 2019-05-15 RX ORDER — ALBUMIN, HUMAN INJ 5% 5 %
SOLUTION INTRAVENOUS PRN
Status: DISCONTINUED | OUTPATIENT
Start: 2019-05-15 | End: 2019-05-15 | Stop reason: SDUPTHER

## 2019-05-15 RX ORDER — FENTANYL CITRATE 50 UG/ML
25 INJECTION, SOLUTION INTRAMUSCULAR; INTRAVENOUS EVERY 5 MIN PRN
Status: DISCONTINUED | OUTPATIENT
Start: 2019-05-15 | End: 2019-05-15 | Stop reason: HOSPADM

## 2019-05-15 RX ORDER — MEPERIDINE HYDROCHLORIDE 50 MG/ML
25 INJECTION INTRAMUSCULAR; INTRAVENOUS; SUBCUTANEOUS ONCE
Status: DISCONTINUED | OUTPATIENT
Start: 2019-05-15 | End: 2019-05-20

## 2019-05-15 RX ORDER — 0.9 % SODIUM CHLORIDE 0.9 %
1000 INTRAVENOUS SOLUTION INTRAVENOUS ONCE
Status: DISCONTINUED | OUTPATIENT
Start: 2019-05-15 | End: 2019-05-15 | Stop reason: HOSPADM

## 2019-05-15 RX ORDER — PANTOPRAZOLE SODIUM 40 MG/10ML
40 INJECTION, POWDER, LYOPHILIZED, FOR SOLUTION INTRAVENOUS DAILY
Status: DISCONTINUED | OUTPATIENT
Start: 2019-05-15 | End: 2019-05-30

## 2019-05-15 RX ORDER — LORAZEPAM 2 MG/ML
1 INJECTION INTRAMUSCULAR
Status: DISCONTINUED | OUTPATIENT
Start: 2019-05-15 | End: 2019-05-15

## 2019-05-15 RX ORDER — PROPOFOL 10 MG/ML
INJECTION, EMULSION INTRAVENOUS PRN
Status: DISCONTINUED | OUTPATIENT
Start: 2019-05-15 | End: 2019-05-15 | Stop reason: SDUPTHER

## 2019-05-15 RX ORDER — FENTANYL CITRATE 50 UG/ML
INJECTION, SOLUTION INTRAMUSCULAR; INTRAVENOUS PRN
Status: DISCONTINUED | OUTPATIENT
Start: 2019-05-15 | End: 2019-05-15 | Stop reason: SDUPTHER

## 2019-05-15 RX ORDER — 0.9 % SODIUM CHLORIDE 0.9 %
500 INTRAVENOUS SOLUTION INTRAVENOUS ONCE
Status: COMPLETED | OUTPATIENT
Start: 2019-05-15 | End: 2019-05-15

## 2019-05-15 RX ORDER — METOCLOPRAMIDE HYDROCHLORIDE 5 MG/ML
10 INJECTION INTRAMUSCULAR; INTRAVENOUS
Status: DISCONTINUED | OUTPATIENT
Start: 2019-05-15 | End: 2019-05-15 | Stop reason: HOSPADM

## 2019-05-15 RX ORDER — PROPOFOL 10 MG/ML
10 INJECTION, EMULSION INTRAVENOUS
Status: DISCONTINUED | OUTPATIENT
Start: 2019-05-15 | End: 2019-05-17

## 2019-05-15 RX ORDER — SODIUM CHLORIDE, SODIUM LACTATE, POTASSIUM CHLORIDE, CALCIUM CHLORIDE 600; 310; 30; 20 MG/100ML; MG/100ML; MG/100ML; MG/100ML
INJECTION, SOLUTION INTRAVENOUS CONTINUOUS
Status: DISCONTINUED | OUTPATIENT
Start: 2019-05-15 | End: 2019-05-28

## 2019-05-15 RX ORDER — HYDROCODONE BITARTRATE AND ACETAMINOPHEN 5; 325 MG/1; MG/1
2 TABLET ORAL PRN
Status: DISCONTINUED | OUTPATIENT
Start: 2019-05-15 | End: 2019-05-15 | Stop reason: HOSPADM

## 2019-05-15 RX ORDER — MAGNESIUM SULFATE 1 G/100ML
1 INJECTION INTRAVENOUS PRN
Status: DISCONTINUED | OUTPATIENT
Start: 2019-05-15 | End: 2019-05-31 | Stop reason: HOSPADM

## 2019-05-15 RX ORDER — MEPERIDINE HYDROCHLORIDE 50 MG/ML
12.5 INJECTION INTRAMUSCULAR; INTRAVENOUS; SUBCUTANEOUS EVERY 5 MIN PRN
Status: DISCONTINUED | OUTPATIENT
Start: 2019-05-15 | End: 2019-05-15 | Stop reason: HOSPADM

## 2019-05-15 RX ORDER — MORPHINE SULFATE 2 MG/ML
2 INJECTION, SOLUTION INTRAMUSCULAR; INTRAVENOUS EVERY 5 MIN PRN
Status: DISCONTINUED | OUTPATIENT
Start: 2019-05-15 | End: 2019-05-15 | Stop reason: HOSPADM

## 2019-05-15 RX ORDER — FENTANYL CITRATE 50 UG/ML
50 INJECTION, SOLUTION INTRAMUSCULAR; INTRAVENOUS EVERY 5 MIN PRN
Status: DISCONTINUED | OUTPATIENT
Start: 2019-05-15 | End: 2019-05-15 | Stop reason: HOSPADM

## 2019-05-15 RX ORDER — ONDANSETRON 2 MG/ML
4 INJECTION INTRAMUSCULAR; INTRAVENOUS
Status: DISCONTINUED | OUTPATIENT
Start: 2019-05-15 | End: 2019-05-15 | Stop reason: HOSPADM

## 2019-05-15 RX ORDER — SODIUM CHLORIDE, SODIUM LACTATE, POTASSIUM CHLORIDE, CALCIUM CHLORIDE 600; 310; 30; 20 MG/100ML; MG/100ML; MG/100ML; MG/100ML
INJECTION, SOLUTION INTRAVENOUS CONTINUOUS
Status: DISCONTINUED | OUTPATIENT
Start: 2019-05-15 | End: 2019-05-15

## 2019-05-15 RX ORDER — 0.9 % SODIUM CHLORIDE 0.9 %
10 VIAL (ML) INJECTION DAILY
Status: DISCONTINUED | OUTPATIENT
Start: 2019-05-15 | End: 2019-05-30

## 2019-05-15 RX ORDER — HYDRALAZINE HYDROCHLORIDE 20 MG/ML
5 INJECTION INTRAMUSCULAR; INTRAVENOUS EVERY 10 MIN PRN
Status: DISCONTINUED | OUTPATIENT
Start: 2019-05-15 | End: 2019-05-15 | Stop reason: HOSPADM

## 2019-05-15 RX ORDER — LIDOCAINE HYDROCHLORIDE 10 MG/ML
INJECTION, SOLUTION EPIDURAL; INFILTRATION; INTRACAUDAL; PERINEURAL PRN
Status: DISCONTINUED | OUTPATIENT
Start: 2019-05-15 | End: 2019-05-15 | Stop reason: SDUPTHER

## 2019-05-15 RX ADMIN — CLINDAMYCIN PHOSPHATE 900 MG: 900 INJECTION, SOLUTION INTRAVENOUS at 13:53

## 2019-05-15 RX ADMIN — Medication 10 MG: at 14:34

## 2019-05-15 RX ADMIN — Medication 10 ML: at 08:22

## 2019-05-15 RX ADMIN — LEVALBUTEROL 1.25 MG: 1.25 SOLUTION, CONCENTRATE RESPIRATORY (INHALATION) at 03:30

## 2019-05-15 RX ADMIN — SODIUM CHLORIDE, POTASSIUM CHLORIDE, SODIUM LACTATE AND CALCIUM CHLORIDE: 600; 310; 30; 20 INJECTION, SOLUTION INTRAVENOUS at 19:20

## 2019-05-15 RX ADMIN — MEROPENEM 1 G: 1 INJECTION, POWDER, FOR SOLUTION INTRAVENOUS at 02:29

## 2019-05-15 RX ADMIN — PHENYLEPHRINE HYDROCHLORIDE 200 MCG: 10 INJECTION INTRAVENOUS at 13:54

## 2019-05-15 RX ADMIN — MEROPENEM 1 G: 1 INJECTION, POWDER, FOR SOLUTION INTRAVENOUS at 08:55

## 2019-05-15 RX ADMIN — FENTANYL CITRATE 25 MCG: 50 INJECTION, SOLUTION INTRAMUSCULAR; INTRAVENOUS at 15:02

## 2019-05-15 RX ADMIN — HYDROMORPHONE HYDROCHLORIDE 0.25 MG: 1 INJECTION, SOLUTION INTRAMUSCULAR; INTRAVENOUS; SUBCUTANEOUS at 19:40

## 2019-05-15 RX ADMIN — PHENYLEPHRINE HYDROCHLORIDE 200 MCG: 10 INJECTION INTRAVENOUS at 14:15

## 2019-05-15 RX ADMIN — Medication 10 ML: at 21:24

## 2019-05-15 RX ADMIN — ALBUMIN (HUMAN) 500 ML: 12.5 INJECTION, SOLUTION INTRAVENOUS at 14:43

## 2019-05-15 RX ADMIN — PHENYLEPHRINE HYDROCHLORIDE 200 MCG: 10 INJECTION INTRAVENOUS at 14:03

## 2019-05-15 RX ADMIN — ROCURONIUM BROMIDE 20 MG: 10 INJECTION INTRAVENOUS at 14:51

## 2019-05-15 RX ADMIN — METOPROLOL TARTRATE 5 MG: 5 INJECTION, SOLUTION INTRAVENOUS at 08:12

## 2019-05-15 RX ADMIN — PHENYLEPHRINE HYDROCHLORIDE 200 MCG: 10 INJECTION INTRAVENOUS at 15:00

## 2019-05-15 RX ADMIN — Medication 20 MG: at 14:29

## 2019-05-15 RX ADMIN — HYDROMORPHONE HYDROCHLORIDE 0.5 MG: 1 INJECTION, SOLUTION INTRAMUSCULAR; INTRAVENOUS; SUBCUTANEOUS at 22:15

## 2019-05-15 RX ADMIN — MORPHINE SULFATE 2 MG: 2 INJECTION, SOLUTION INTRAMUSCULAR; INTRAVENOUS at 04:31

## 2019-05-15 RX ADMIN — NOREPINEPHRINE BITARTRATE 2 MCG/MIN: 1 INJECTION INTRAVENOUS at 18:08

## 2019-05-15 RX ADMIN — ROCURONIUM BROMIDE 10 MG: 10 INJECTION INTRAVENOUS at 14:11

## 2019-05-15 RX ADMIN — PHENYLEPHRINE HYDROCHLORIDE 200 MCG: 10 INJECTION INTRAVENOUS at 14:32

## 2019-05-15 RX ADMIN — FENTANYL CITRATE 50 MCG: 50 INJECTION, SOLUTION INTRAMUSCULAR; INTRAVENOUS at 16:13

## 2019-05-15 RX ADMIN — ROCURONIUM BROMIDE 40 MG: 10 INJECTION INTRAVENOUS at 13:48

## 2019-05-15 RX ADMIN — PHENYLEPHRINE HYDROCHLORIDE 200 MCG: 10 INJECTION INTRAVENOUS at 14:20

## 2019-05-15 RX ADMIN — IPRATROPIUM BROMIDE 0.5 MG: 0.5 SOLUTION RESPIRATORY (INHALATION) at 06:57

## 2019-05-15 RX ADMIN — FENTANYL CITRATE 50 MCG: 50 INJECTION, SOLUTION INTRAMUSCULAR; INTRAVENOUS at 14:11

## 2019-05-15 RX ADMIN — Medication 10 ML: at 08:12

## 2019-05-15 RX ADMIN — LEVALBUTEROL 1.25 MG: 1.25 SOLUTION, CONCENTRATE RESPIRATORY (INHALATION) at 06:57

## 2019-05-15 RX ADMIN — FENTANYL CITRATE 25 MCG: 50 INJECTION, SOLUTION INTRAMUSCULAR; INTRAVENOUS at 13:48

## 2019-05-15 RX ADMIN — IPRATROPIUM BROMIDE 0.5 MG: 0.5 SOLUTION RESPIRATORY (INHALATION) at 03:30

## 2019-05-15 RX ADMIN — SODIUM CHLORIDE, POTASSIUM CHLORIDE, SODIUM LACTATE AND CALCIUM CHLORIDE: 600; 310; 30; 20 INJECTION, SOLUTION INTRAVENOUS at 11:40

## 2019-05-15 RX ADMIN — PANTOPRAZOLE SODIUM 40 MG: 40 INJECTION, POWDER, FOR SOLUTION INTRAVENOUS at 21:24

## 2019-05-15 RX ADMIN — SODIUM CHLORIDE 500 ML: 9 INJECTION, SOLUTION INTRAVENOUS at 19:43

## 2019-05-15 RX ADMIN — FENTANYL CITRATE 25 MCG: 50 INJECTION, SOLUTION INTRAMUSCULAR; INTRAVENOUS at 13:31

## 2019-05-15 RX ADMIN — SODIUM CHLORIDE: 9 INJECTION, SOLUTION INTRAVENOUS at 14:30

## 2019-05-15 RX ADMIN — PROPOFOL 10 MCG/KG/MIN: 10 INJECTION, EMULSION INTRAVENOUS at 19:20

## 2019-05-15 RX ADMIN — IPRATROPIUM BROMIDE AND ALBUTEROL SULFATE 1 AMPULE: .5; 3 SOLUTION RESPIRATORY (INHALATION) at 19:34

## 2019-05-15 RX ADMIN — LIDOCAINE HYDROCHLORIDE 50 MG: 10 INJECTION, SOLUTION EPIDURAL; INFILTRATION; INTRACAUDAL; PERINEURAL at 13:48

## 2019-05-15 RX ADMIN — PHENYLEPHRINE HYDROCHLORIDE 200 MCG: 10 INJECTION INTRAVENOUS at 14:26

## 2019-05-15 RX ADMIN — LORAZEPAM 0.5 MG: 2 INJECTION INTRAMUSCULAR; INTRAVENOUS at 05:06

## 2019-05-15 RX ADMIN — PANTOPRAZOLE SODIUM 40 MG: 40 INJECTION, POWDER, FOR SOLUTION INTRAVENOUS at 08:12

## 2019-05-15 RX ADMIN — PROPOFOL 50 MG: 10 INJECTION, EMULSION INTRAVENOUS at 13:48

## 2019-05-15 RX ADMIN — FENTANYL CITRATE 25 MCG: 50 INJECTION, SOLUTION INTRAMUSCULAR; INTRAVENOUS at 14:54

## 2019-05-15 ASSESSMENT — PULMONARY FUNCTION TESTS
PIF_VALUE: 1
PIF_VALUE: 16
PIF_VALUE: 14
PIF_VALUE: 16
PIF_VALUE: 20
PIF_VALUE: 14
PIF_VALUE: 15
PIF_VALUE: 15
PIF_VALUE: 14
PIF_VALUE: 1
PIF_VALUE: 14
PIF_VALUE: 20
PIF_VALUE: 15
PIF_VALUE: 15
PIF_VALUE: 14
PIF_VALUE: 14
PIF_VALUE: 16
PIF_VALUE: 15
PIF_VALUE: 13
PIF_VALUE: 4
PIF_VALUE: 14
PIF_VALUE: 20
PIF_VALUE: 4
PIF_VALUE: 15
PIF_VALUE: 14
PIF_VALUE: 15
PIF_VALUE: 14
PIF_VALUE: 14
PIF_VALUE: 0
PIF_VALUE: 14
PIF_VALUE: 16
PIF_VALUE: 1
PIF_VALUE: 15
PIF_VALUE: 14
PIF_VALUE: 15
PIF_VALUE: 16
PIF_VALUE: 15
PIF_VALUE: 14
PIF_VALUE: 16
PIF_VALUE: 33
PIF_VALUE: 14
PIF_VALUE: 16
PIF_VALUE: 15
PIF_VALUE: 14
PIF_VALUE: 14
PIF_VALUE: 20
PIF_VALUE: 19
PIF_VALUE: 15
PIF_VALUE: 18
PIF_VALUE: 15
PIF_VALUE: 14
PIF_VALUE: 13
PIF_VALUE: 15
PIF_VALUE: 14
PIF_VALUE: 15
PIF_VALUE: 13
PIF_VALUE: 14
PIF_VALUE: 16
PIF_VALUE: 15
PIF_VALUE: 3
PIF_VALUE: 15
PIF_VALUE: 14
PIF_VALUE: 14
PIF_VALUE: 21
PIF_VALUE: 14
PIF_VALUE: 14
PIF_VALUE: 1
PIF_VALUE: 15
PIF_VALUE: 15
PIF_VALUE: 19
PIF_VALUE: 16
PIF_VALUE: 14
PIF_VALUE: 1
PIF_VALUE: 16
PIF_VALUE: 13
PIF_VALUE: 16
PIF_VALUE: 14
PIF_VALUE: 14
PIF_VALUE: 16
PIF_VALUE: 14
PIF_VALUE: 16
PIF_VALUE: 20
PIF_VALUE: 0
PIF_VALUE: 16
PIF_VALUE: 15
PIF_VALUE: 16
PIF_VALUE: 16
PIF_VALUE: 14
PIF_VALUE: 14
PIF_VALUE: 26
PIF_VALUE: 15
PIF_VALUE: 14
PIF_VALUE: 15
PIF_VALUE: 4
PIF_VALUE: 3
PIF_VALUE: 15
PIF_VALUE: 4
PIF_VALUE: 14
PIF_VALUE: 15
PIF_VALUE: 15
PIF_VALUE: 14
PIF_VALUE: 16
PIF_VALUE: 14
PIF_VALUE: 1
PIF_VALUE: 15
PIF_VALUE: 18
PIF_VALUE: 14
PIF_VALUE: 15
PIF_VALUE: 3
PIF_VALUE: 19
PIF_VALUE: 14
PIF_VALUE: 15
PIF_VALUE: 15
PIF_VALUE: 14
PIF_VALUE: 13
PIF_VALUE: 16
PIF_VALUE: 14
PIF_VALUE: 16
PIF_VALUE: 15
PIF_VALUE: 17
PIF_VALUE: 15
PIF_VALUE: 17
PIF_VALUE: 14
PIF_VALUE: 15
PIF_VALUE: 15
PIF_VALUE: 13
PIF_VALUE: 15
PIF_VALUE: 1
PIF_VALUE: 14
PIF_VALUE: 15
PIF_VALUE: 14
PIF_VALUE: 16
PIF_VALUE: 14
PIF_VALUE: 16
PIF_VALUE: 14
PIF_VALUE: 16
PIF_VALUE: 14
PIF_VALUE: 16
PIF_VALUE: 18
PIF_VALUE: 14
PIF_VALUE: 14
PIF_VALUE: 15
PIF_VALUE: 14
PIF_VALUE: 14
PIF_VALUE: 15
PIF_VALUE: 15
PIF_VALUE: 14
PIF_VALUE: 15
PIF_VALUE: 14
PIF_VALUE: 14
PIF_VALUE: 3
PIF_VALUE: 14
PIF_VALUE: 14
PIF_VALUE: 15
PIF_VALUE: 14
PIF_VALUE: 13
PIF_VALUE: 15
PIF_VALUE: 1
PIF_VALUE: 15
PIF_VALUE: 13
PIF_VALUE: 20
PIF_VALUE: 15
PIF_VALUE: 16
PIF_VALUE: 15
PIF_VALUE: 15

## 2019-05-15 ASSESSMENT — PAIN SCALES - GENERAL
PAINLEVEL_OUTOF10: 9
PAINLEVEL_OUTOF10: 0
PAINLEVEL_OUTOF10: 0
PAINLEVEL_OUTOF10: 5
PAINLEVEL_OUTOF10: 0

## 2019-05-15 ASSESSMENT — ENCOUNTER SYMPTOMS
NAUSEA: 0
CONSTIPATION: 0
SHORTNESS OF BREATH: 0
VOMITING: 0
ABDOMINAL PAIN: 0

## 2019-05-15 ASSESSMENT — LIFESTYLE VARIABLES: SMOKING_STATUS: 1

## 2019-05-15 NOTE — PROGRESS NOTES
[address]   OCCUPATIONAL THERAPY MISSED TREATMENT NOTE   INPATIENT   Date: 5/15/19  Patient Name: Rosangela Malone       Room:   MRN: [id number]   Account #: [de-identified]    : 1950  (77 y.o.)  Gender: female   Referring Practitioner: Vera Bryant MD  Diagnosis: Sepsis due to UTI             REASON FOR MISSED TREATMENT:  Hold treatment per nursing request   -   Surgery  Per RN Patrick Negrete pt will shortly be leaving room for gallbladder/bowel obstruction sx. Writer interacted with patient, provided support and corin for encouragement. Pt voices G comfort with surgery and is hopeful for a quick recovery.         KORINA Briggs

## 2019-05-15 NOTE — PLAN OF CARE
Problem: Risk for Impaired Skin Integrity  Goal: Tissue integrity - skin and mucous membranes  Description  Structural intactness and normal physiological function of skin and  mucous membranes. Outcome: Ongoing     Problem: Falls - Risk of:  Goal: Will remain free from falls  Description  Will remain free from falls  Outcome: Ongoing  Note:   Patient remained free from falls this shift. Call light and bed side table are within reach, bed is in lowest position, and side rails are up x2. Will continue to monitor.    Goal: Absence of physical injury  Description  Absence of physical injury  Outcome: Ongoing     Problem: Infection, Septic Shock:  Goal: Will show no infection signs and symptoms  Description  Will show no infection signs and symptoms  Outcome: Ongoing     Problem: Tissue Perfusion, Altered:  Goal: Circulatory function within specified parameters  Description  Circulatory function within specified parameters  Outcome: Ongoing     Problem: Pain:  Goal: Pain level will decrease  Description  Pain level will decrease  Outcome: Ongoing  Goal: Control of acute pain  Description  Control of acute pain  Outcome: Ongoing  Goal: Control of chronic pain  Description  Control of chronic pain  Outcome: Ongoing     Problem: Nutrition  Goal: Optimal nutrition therapy  Description       Outcome: Ongoing

## 2019-05-15 NOTE — PROGRESS NOTES
Pt put on ventilator post op, prvc 16, vt 500, 50%, peep 5cm. Et  Strap put on pt, et @ 19 cm @ the lip, abg drawn and given to . . O2 decreased to 40%.

## 2019-05-15 NOTE — BRIEF OP NOTE
Brief Postoperative Note  ______________________________________________________________    Patient: Rosangela Malone  YOB: 1950  MRN: [id number]  Date of Procedure: 5/15/2019    Pre-Op Diagnosis: Chronic cholecystitis. Small bowel obstruction due to adhesions and internal hernia    Post-Op Diagnosis: Same       Procedure(s):  DIAGNOSTIC LAPAROSCOPY. EXPLORATORY LAPAROTOMY. EXTENSIVE LYSIS OF ADHESIONS. RIGHT COLON RESECTION WITH ANASTOMOSIS. OPEN CHOLECYSTECTOMY. EXCISION OF SMALL BOWEL DIVERTICULUM. Anesthesia: General    Surgeon(s):  Zafar Lyons DO    Estimated Blood Loss (mL): 342     Complications: None    Specimens:   ID Type Source Tests Collected by Time Destination   1 : Urine on dyer insertion Urine Urine URINE RT REFLEX TO CULTURE Zafar Lyons DO 5/15/2019 1604    A : Right colon Tissue Colon SURGICAL PATHOLOGY Zafar Lyons,  5/15/2019 1443    B : Gallbladder Tissue Gallbladder SURGICAL PATHOLOGY Zafar Lyons,  5/15/2019 1500    C : Small bowel diverticulum Tissue Ileum SURGICAL PATHOLOGY Zafar Lyons, DO 5/15/2019 1501        Drains:   Closed/Suction Drain Right;Medial Abdomen Other (Comment) [facility] (Active)   Site Description Healing 4/22/2019  4:00 PM   Dressing Status Clean;Dry; Intact 4/22/2019  4:00 PM   Drainage Appearance Brown 4/22/2019  4:00 PM   Status Open to gravity drainage 4/22/2019  4:00 PM       Closed/Suction Drain Lateral RLQ Bulb 19 [place] (Active)       NG/OG/NJ/NE Tube Nasogastric 18 fr Left nostril (Active)       Rectal Tube (Active)   Stool Appearance Loose 5/13/2019  5:23 PM   Stool Color Other (Comment) 5/13/2019  5:23 PM   Stool Amount Small 5/13/2019  5:23 PM   Rectal Tube Output 25 ml 5/13/2019  5:23 PM       Urethral Catheter Non-latex 16 fr (Active)       [REMOVED] Biliary Tube Other (Comment) 8 fr RLQ (Removed)   Site Description Other (Comment) 5/13/2019 10:32 AM   Dressing Status Clean;Dry; Intact 5/14/2019  8:36 AM   Dressing Type Other (Comment) 5/13/2019  5:23 PM   Drainage Color Green 5/14/2019  8:36 AM   Drain Status Open to gravity drainage 5/12/2019  8:00 AM   Output (mL) 250 ml 5/14/2019  8:36 AM       [REMOVED] NG/OG/NJ/NE Tube Orogastric Center mouth (Removed)   Surrounding Skin Intact;Dry 4/16/2019 12:00 PM   Securement device Yes 4/16/2019 12:00 PM   Status Suction-low intermittent 4/16/2019 12:00 PM   Placement Verified by Gastric Contents 4/16/2019 12:00 PM   NG/OG/NJ/NE External Measurement (cm) 55 cm 4/16/2019  8:00 AM   Drainage Appearance Brown 4/16/2019 12:00 PM   Free Water Flush (mL) 30 mL 4/16/2019 12:00 AM   Output (mL) 50 ml 4/16/2019  5:27 AM       [REMOVED] NG/OG/NJ/NE Tube Nasogastric Left nostril (Removed)   Surrounding Skin Dry; Intact 4/22/2019  8:00 AM   Securement device Yes 4/22/2019  8:00 AM   Status Suction-low intermittent 4/22/2019  8:00 AM   Placement Verified by Gastric Contents;by External Catheter Length 4/21/2019  4:00 PM   NG/OG/NJ/NE External Measurement (cm) 56 cm 4/21/2019  4:00 PM   Drainage Appearance Brown;Cloudy 4/22/2019  8:00 AM   Free Water Flush (mL) 60 mL 4/21/2019  4:00 PM   Output (mL) 100 ml 4/22/2019  5:35 AM       [REMOVED] NG/OG/NJ/NE Tube Nasogastric 12 fr Right nostril (Removed)   Surrounding Skin Dry; Intact 5/12/2019 12:00 PM   Securement device Yes 5/12/2019 12:00 PM   Status Suction-low intermittent 5/12/2019  8:00 AM   Placement Verified by X-Ray (repeat) 5/12/2019  8:00 AM   Drainage Appearance Bile 5/12/2019 12:00 PM   Output (mL) 300 ml 5/12/2019  5:00 AM       [REMOVED] Urethral Catheter Coude 16 fr (Removed)   Catheter Indications Urology/Urologist seeing this patient or inserted indwelling catheter;Acute urinary retention/obstruction; Need for fluid management in critically ill patients in a critical care setting not able to be managed by other means such as BSC with hat, bedpan, urinal, condom catheter, or short term intermittent urethral catherization 4/19/2019  4:15 AM Securement Device Date Changed 04/17/19 4/17/2019 12:00 AM   Site Assessment No urethral drainage;Pink 4/19/2019  4:15 AM   Urine Color Izabella;Yellow 4/19/2019  4:15 AM   Urine Appearance Clear 4/19/2019  4:15 AM   Urine Odor Malodorous 4/17/2019  6:20 AM   Output (mL) 2803 mL 4/19/2019  6:33 AM       [REMOVED] Urethral Catheter Non-latex 16 fr (Removed)   $ Urethral catheter insertion $ Not inserted for procedure 4/19/2019  4:15 AM   Catheter Indications Urology/Urologist seeing this patient or inserted indwelling catheter 4/29/2019  2:50 PM   Securement Device Date Changed 04/21/19 4/21/2019  7:10 AM   Site Assessment No urethral drainage 4/29/2019  2:50 PM   Urine Color Yellow 4/29/2019  2:50 PM   Urine Appearance Clear 4/29/2019  2:50 PM   Output (mL) 1100 mL 4/29/2019  5:34 PM       [REMOVED] External Urinary Catheter (Removed)   Urine Color Izabella 5/3/2019  4:00 AM   Urine Appearance Clear 5/3/2019  4:00 AM   Output (mL) 650 mL 5/3/2019  5:54 AM   Suction- Female Only 40 mmgHg continuous 5/3/2019  4:00 AM   Placement Replaced 5/2/2019  8:00 PM   Skin Assessment No Injury 5/3/2019  4:00 AM       [REMOVED] External Urinary Catheter (Removed)   Urine Color Izabella 5/8/2019  1:41 PM   Urine Appearance Cloudy 5/8/2019  1:41 PM   Output (mL) 450 mL 5/8/2019  1:41 PM   Suction- Female Only 40 mmgHg continuous 5/8/2019 11:08 AM   Placement Replaced 5/8/2019  1:41 PM   Skin Assessment No Injury 5/8/2019  8:24 AM       [REMOVED] External Urinary Catheter (Removed)   Urine Color Yellow 5/11/2019  4:00 AM   Urine Appearance Cloudy 5/11/2019  4:00 AM   Output (mL) 350 mL 5/11/2019  4:14 AM   Suction- Female Only 40 mmgHg continuous 5/11/2019  4:00 AM   Placement Initiated 5/10/2019  9:00 AM   Skin Assessment No Injury 5/11/2019  4:00 AM       Findings: cholecystostomy tube traversing through transverse colon.  Extensive small bowel adhesions      Sony Span, DO FACS  General Surgery  [facility]  Date: 5/15/2019  Time:

## 2019-05-15 NOTE — PROGRESS NOTES
Labs:    [unfilled]    Lab Results   Component Value Date/Time    SPECIAL right hand 1cc 05/10/2019 10:18 AM     Lab Results   Component Value Date/Time    CULTURE NO GROWTH 5 DAYS 05/10/2019 10:18 AM       [facility]    Radiology:    Ct Abdomen Pelvis Wo Contrast Additional Contrast? Oral    Result Date: 4/14/2019  EXAMINATION: CT OF THE ABDOMEN AND PELVIS WITHOUT CONTRAST 4/14/2019 7:34 pm TECHNIQUE: CT of the abdomen and pelvis was performed without the administration of intravenous contrast. Multiplanar reformatted images are provided for review. Dose modulation, iterative reconstruction, and/or weight based adjustment of the mA/kV was utilized to reduce the radiation dose to as low as reasonably achievable. COMPARISON: 04/11/2019 HISTORY: ORDERING SYSTEM PROVIDED HISTORY: ABDOMINAL PAIN TECHNOLOGIST PROVIDED HISTORY: Water soluble contrast only please Ordering Physician Provided Reason for Exam: Abdominal pain - Vented patient. Contrast given via nurse through NG tube. Acuity: Unknown Type of Exam: Unknown Relevant Medical/Surgical History: Hx - Sepsis due to urinary tract infection. FINDINGS: Lower Chest: New moderate layering bilateral pleural effusions with bilateral lower lobe atelectasis. Organs: Limited evaluation due lack of intravenous contrast.  Cholelithiasis redemonstrated. No gallbladder wall thickening or biliary ductal dilatation. Scattered tiny hypodense lesions in the liver are too small to characterize but statistically represent benign cysts or hemangiomas and appear unchanged. The pancreas, spleen, adrenal glands, and kidneys are unremarkable. There is no hydronephrosis or urinary tract calculus. GI/Bowel: The stomach is distended. Enteric tube is in place. No contrast is seen distal to the pylorus and there is contrast reflux into the distal esophagus. There is no evidence of bowel obstruction. The appendix is not definitely visualized.   No focal pericecal inflammatory changes are evident. Pelvis: The urinary bladder is decompressed by Tran catheter. No pelvic mass is seen. Peritoneum/Retroperitoneum: Small amount of free fluid in the pelvic cavity. No free air or focal fluid collection. No abnormal lymph node. Normal abdominal aortic caliber. Moderate calcific atherosclerosis. Bones/Soft Tissues: No acute osseous abnormality. Diffuse anasarca. Moderate degenerative changes in the lumbar spine. 1. Distended, contrast filled stomach with reflux of contrast into the esophagus. No enteric contrast is seen distal to the pylorus suggesting delayed gastric emptying in the setting of ileus. 2. New moderate layering bilateral pleural effusions with bilateral lower lobe atelectasis. 3. Small amount of nonspecific free fluid in the pelvic cavity. 4. Anasarca. 5. Cholelithiasis. Xr Chest (single View Frontal)    Result Date: 5/11/2019  EXAMINATION: ONE XRAY VIEW OF THE CHEST 5/11/2019 6:28 am COMPARISON: 10 May 2019 HISTORY: ORDERING SYSTEM PROVIDED HISTORY: Respiratory failure TECHNOLOGIST PROVIDED HISTORY: Respiratory failure Ordering Physician Provided Reason for Exam: Respiratory failure Acuity: Unknown Type of Exam: Unknown FINDINGS: AP portable view of the chest time stamped at 603 hours demonstrates findings of generalized COPD. Overlying cardiac monitoring electrodes are present. An intestinal tube extends below the fundus of the stomach, tip not included. Right central line terminates in the distal superior vena cava. Bipolar pacemaker enters from the left with intact leads in appropriate positions. Heart size is stable, top-normal.  Lung fields are hyperinflated suggestive of COPD. Interstitial markings are coarsened, similar to that noted in 2015 likely chronic interstitial change. There is improved aeration at the left lung base with slight blunting of the left costophrenic angle suggesting effusion.   There is been interval clearing of right basilar opacities likely resolved atelectasis. Osseous and mediastinal structures are age-appropriate. No vascular congestion or extrapleural air is noted. Improved aeration of both lung bases with resolved right basilar opacity. Mild left basilar opacity persists with blunting of the left costophrenic angle with small effusion on the left suspected. Findings of COPD and suspected chronic fibrotic change are noted. No extrapleural air. Tubes and lines as above. Xr Chest (single View Frontal)    Result Date: 5/2/2019  EXAMINATION: SINGLE XRAY VIEW OF THE CHEST 5/2/2019 6:34 am COMPARISON: 29 April 2019 HISTORY: ORDERING SYSTEM PROVIDED HISTORY: COPD TECHNOLOGIST PROVIDED HISTORY: COPD Ordering Physician Provided Reason for Exam: COPD Acuity: Acute Type of Exam: Initial FINDINGS: AP portable view of the chest time stamped at 630 hours demonstrates stable cardiac size. Overlying cardiac monitoring electrodes are present. Right-sided PICC line terminates in the right atrium. Bipolar pacemaker enters from the left with intact leads in appropriate positions. There is been no significant interval change in bilateral interstitial opacities, representing interval change since 2015. This may be related to worsening interstitial disease or superimposed venous congestion. Bilateral effusions are noted with bibasilar opacities, either atelectasis or edema. Continued bilateral effusions, bibasilar opacities and bilateral interstitial opacities in the upper lobes. Findings may be related to worsening interstitial disease and edema. Airspace disease is not excluded.      Xr Chest (single View Frontal)    Result Date: 4/13/2019  EXAMINATION: SINGLE XRAY VIEW OF THE CHEST 4/13/2019 7:18 am COMPARISON: April 12, 2019 HISTORY: ORDERING SYSTEM PROVIDED HISTORY: dyspnea TECHNOLOGIST PROVIDED HISTORY: dyspnea Ordering Physician Provided Reason for Exam: dyspnea Acuity: Acute Type of Exam: Subsequent/Follow-up Additional signs and symptoms: dyspnea FINDINGS: A right IJ catheter is seen with its tip terminating at the superior cavoatrial junction. The left chest wall pacemaker and leads are stable. The cardiomediastinal silhouette is stable. There is interval increased opacity at the right lung base, may be related to atelectasis versus pneumonia. There is small atelectasis and mild pleural effusion at the left lung base. There is no pneumothorax. There is no acute osseous abnormality. Interval increased opacity at the right lung base, may be related to atelectasis versus pneumonia. Small atelectasis and mild pleural effusion at the left lung, new since the prior study. Xr Chest Standard (2 Vw)    Result Date: 4/29/2019  EXAMINATION: TWO VIEWS OF THE CHEST 4/29/2019 8:04 pm COMPARISON: 04/27/2019 HISTORY: ORDERING SYSTEM PROVIDED HISTORY: Follow-up lung infiltrate TECHNOLOGIST PROVIDED HISTORY: Follow-up lung infiltrate Ordering Physician Provided Reason for Exam: Follow-up infiltrate. Pt unable to lean forward - unable to get proper sponge behind pt for lateral. Pt unable to raise left arm at all for lateral. Best possible lateral obtained. Acuity: Unknown Type of Exam: Unknown Additional signs and symptoms: Follow-up infiltrate. Pt unable to lean forward - unable to get proper sponge behind pt for lateral. Pt unable to raise left arm at all for lateral. Best possible lateral obtained. FINDINGS: Left chest cardiac pacemaker device is in place. Right IJ central venous catheter in place with distal tip at the cavoatrial junction. Heart size is within normal limits. No vascular congestion. There are small to moderate pleural effusions. There are interstitial opacities in the upper lungs, which could be related to scarring and/or developing infiltrate, slightly improved in appearance when compared to 04/27/2019. No evidence of pneumothorax. 1.  Small to moderate bilateral pleural effusions, better visualized on lateral view.  2. Upper lobe interstitial opacities, slightly improved when compared to the previous study, possibly related to underlying fibrotic change or residual infiltrate. Xr Knee Left (1-2 Views)    Result Date: 5/8/2019  EXAMINATION: 2 XRAY VIEWS OF THE LEFT KNEE 5/7/2019 11:19 am COMPARISON: Left knee radiographs 03/30/2019. HISTORY: ORDERING SYSTEM PROVIDED HISTORY: fracture TECHNOLOGIST PROVIDED HISTORY: fracture Ordering Physician Provided Reason for Exam: left knee/distal femur pain Acuity: Acute Type of Exam: Initial FINDINGS: Bones are osteopenic. No suprapatellar joint effusion. There is a impacted, transverse fracture of the distal femoral metadiaphysis. Increased sclerosis along the fracture line suggests interval healing. Fracture fragments are in unchanged, near anatomic alignment. No acute dislocation. No new fracture is seen. Impacted, transverse fracture of the distal femoral metadiaphysis is in unchanged alignment and demonstrates interval healing. Xr Abdomen (kub) (single Ap View)    Result Date: 5/10/2019  EXAMINATION: ONE SUPINE XRAY VIEW(S) OF THE ABDOMEN 5/10/2019 9:14 am COMPARISON: Small-bowel series 05/09/2019 HISTORY: ORDERING SYSTEM PROVIDED HISTORY: Small bowel obstruction ([address] TECHNOLOGIST PROVIDED HISTORY: TO ACCOMPANY SMALL BOWEL EXAM SMALL BOWEL OBSTRUCTION Ordering Physician Provided Reason for Exam: SMALL BOWEL FOLLOW THRU - 20 HOUR DELAYED FILM FINDINGS: Significant interval decreased amount of retained contrast in the stomach compared to last image from earlier small bowel series which likely relates to suctioning of the contrast.  Majority of previously seen contrast within the pelvis likely in the rectum is no longer visualized and has likely passed through. Residual contrast remains within the colon and small bowel. NG tube is in place with side hole in the region of the GE junction. Partially visualized mild left pleural effusion associated atelectasis.      1. the radiation dose to as low as reasonably achievable. COMPARISON: April 14, 2019 HISTORY: ORDERING SYSTEM PROVIDED HISTORY: Check for abscess/fluid collection around ashley tube site. TECHNOLOGIST PROVIDED HISTORY: Ordering Physician Provided Reason for Exam: Patient c/o abd pain around her ashley site and drainage tube. FINDINGS: Evaluation is limited by motion. Lower Chest: Moderate bilateral pleural effusions. Organs: Multiple liver hypodensities measuring up to 4 mm are incompletely characterized but in the absence of risk factors likely represent cysts requiring no specific follow-up. Cholecystostomy tube is in place. No adjacent focal drainable fluid collection. No pancreatic lesion. No splenomegaly. No adrenal lesion. No hydronephrosis. No renal lesion. GI/Bowel: Stomach is markedly distended. Swirled appearance of the mesentery is seen within the mid to lower abdomen with adjacent thickened loops of small bowel. Peritoneum/Retroperitoneum: Atherosclerotic calcification of the abdominal aorta without aneurysmal dilatation. No adenopathy. Bones/Soft Tissues: No acute soft tissue abnormality. Diffuse osteopenia. Lumbar spine degenerative changes. Bowel obstruction which is suspected to be caused by an internal hernia. Cholecystostomy tube is in place. No surrounding fluid collection. Nm Gi Blood Loss    Result Date: 5/3/2019  EXAMINATION: NUCLEAR MEDICINE GASTRIC BLEEDING STUDY 5/3/2019 TECHNIQUE: Following the intravenous injection of 23.8 mCi of 99 mTc-labeled RBC's, a flow study and standard images of the abdomen was obtained over a total period of 60 minutes COMPARISON: None. HISTORY: ORDERING SYSTEM PROVIDED HISTORY: GI BLEEDING TECHNOLOGIST PROVIDED HISTORY: Ordering Physician Provided Reason for Exam: GI bleeding Acuity: Unknown Type of Exam: Unknown FINDINGS: Activity is seen within the blood pool, including the great vessels, heart, liver, and spleen.   There was no abnormal accumulation of radioactivity in the abdomen to suggest active bleeding during study acquisition. No evidence of active GI bleeding during acquisition. RECOMMENDATIONS: If the patient shows hemodynamic signs of an active bleed in the next 20 hours, additional images can be acquired. Kate Jacobs Reddish Device Plmt/replace/removal    Result Date: 4/30/2019  PROCEDURE: ULTRASOUND GUIDED VASCULAR ACCESS. FLUOROSCOPY GUIDED PICC PLACEMENT 4/30/2019. HISTORY: ORDERING SYSTEM PROVIDED HISTORY: TPN TECHNOLOGIST PROVIDED HISTORY: TPN Lumen?->Double Lumen SEDATION: None FLUOROSCOPY DOSE AND TYPE OR TIME AND EXPOSURES: 7 seconds; D  TECHNIQUE: This procedure was performed by Dr. Milagros Tony. Informed consent was obtained after a detailed explanation of the procedure including risks, benefits, and alternatives. Universal protocol was observed. The right arm was prepped and draped in sterile fashion using maximum sterile barrier technique. Local anesthesia was achieved with lidocaine. A micropuncture needle was used to access the right basilic vein using ultrasound guidance. An ultrasound image demonstrating patency of the vein with needle tip located within it. An image was obtained and stored in PACs. A 0.018 guidewire was used to place a peel-a-way sheath and a 5 Amharic dual-lumen PICC was advanced with fluoroscopic guidance with the tip at the cavo-atrial junction. The catheter flushed easily and there was a good blood return. The catheter was secured to the skin. The patient tolerated the procedure well and there were no immediate complications. FINDINGS: Fluoroscopic image demonstrates the tip of the catheter at the cavo-atrial junction.      Successful ultrasound and fluoroscopy guided PICC placement     Us Gallbladder Ruq    Result Date: 4/19/2019  EXAMINATION: RIGHT UPPER QUADRANT ULTRASOUND 4/19/2019 10:48 am COMPARISON: CT abdomen and pelvis 4/14/2018 HISTORY: ORDERING SYSTEM PROVIDED HISTORY: ABDOMINAL PAIN FINDINGS: Pancreas is not well visualized. No liver lesion. Gallbladder wall thickening. Gallbladder sludge. Cholelithiasis. Pericholecystic fluid. Common bile duct measures at the upper limits of normal at 7 mm. Findings suggesting acute cholecystitis. Xr Chest Portable    Result Date: 5/10/2019  EXAMINATION: ONE XRAY VIEW OF THE CHEST 5/10/2019 1:28 am COMPARISON: May 2, 2019. HISTORY: ORDERING SYSTEM PROVIDED HISTORY: low o2 sat TECHNOLOGIST PROVIDED HISTORY: low o2 sat Ordering Physician Provided Reason for Exam: Low o2 sat Acuity: Acute Type of Exam: Initial FINDINGS: Hyperexpanded right lung volume. Left greater than right basilar heterogeneous opacities with left basilar consolidation. Small bilateral pleural effusions. Stable cardiomediastinal silhouette and great vessels. Enteric contrast in the stomach and distal esophagus. Enteric tube courses into the stomach but tip is not definitely seen due to contrast in the stomach. Stable left pectoral cardiac pacer device. Stable right PICC. Left greater than right basilar heterogeneous opacities with left basilar consolidation. Differential considerations include atelectasis and an infectious/inflammatory process. Small bilateral pleural effusions. Enteric contrast in the stomach and distal esophagus. Enteric tube courses into the stomach but tip is not definitely seen due to contrast in the stomach. Xr Chest Portable    Result Date: 4/27/2019  EXAMINATION: SINGLE XRAY VIEW OF THE CHEST 4/27/2019 8:47 am COMPARISON: 04/26/2019 HISTORY: ORDERING SYSTEM PROVIDED HISTORY: Assess for pulm edema vs PNA TECHNOLOGIST PROVIDED HISTORY: Assess for pulm edema vs PNA Ordering Physician Provided Reason for Exam: cough, congestion Acuity: Acute Type of Exam: Initial FINDINGS: Right IJ central venous catheter is in place tip in the SVC right atrial junction. Pacer wires are in place. The heart and mediastinal structures are stable.   Pleural distal superior vena cava. Interval removal of nasogastric tube. No interval change of infiltrate and atelectasis at the left lung base. Stable mild infiltrate in the left upper lobe. Mild interval improvement of infiltrate at the right lung base. Stable small bilateral pleural effusions, left larger than right. Mild CHF, stable. 1.  No interval change of infiltrate and atelectasis at the left lung base. Stable mild infiltrate in the left upper lobe. 2.  Mild interval improvement of infiltrate at the right lung base. 3.  Stable small bilateral pleural effusions, left larger than right. 4.  Mild CHF, stable. Xr Chest Portable    Result Date: 4/22/2019  EXAMINATION: SINGLE XRAY VIEW OF THE CHEST 4/22/2019 6:44 am COMPARISON: April 21, 2019. HISTORY: ORDERING SYSTEM PROVIDED HISTORY: Hypoxia and CHF TECHNOLOGIST PROVIDED HISTORY: Hypoxia and CHF Ordering Physician Provided Reason for Exam: hx of hypoxia/CHF Acuity: Acute Type of Exam: Initial FINDINGS: Right IJ central venous catheter appears in unchanged position. Nasogastric tube courses below the diaphragm, with tip not imaged. Left chest wall pacemaker device projects in unchanged position. Cardiac and mediastinal contours are enlarged but unchanged. Unchanged bilateral pleural effusions and bibasilar pulmonary opacities. Unchanged findings suggestive of congestive heart failure. No evidence of pneumothorax. No new osseous abnormalities. 1. No significant change in findings suggestive of congestive heart failure. 2. Unchanged bilateral pleural effusions and bibasilar pulmonary consolidations. RECOMMENDATION: Radiographic follow-up to complete resolution.      Xr Chest Portable    Result Date: 4/21/2019  EXAMINATION: SINGLE XRAY VIEW OF THE CHEST 4/21/2019 3:08 pm COMPARISON: April 19, 2019 HISTORY: ORDERING SYSTEM PROVIDED HISTORY: hypoxia TECHNOLOGIST PROVIDED HISTORY: hypoxia Ordering Physician Provided Reason for Exam: hypoxia Acuity: Unknown Type of Exam: Unknown FINDINGS: Enteric tube in the stomach. ET tube is been removed. Right IJ catheter terminates in the atrial caval junction. Bipolar pacer on the left unchanged. Heart and mediastinum unremarkable. Moderate edema unchanged. Small effusions, left greater than right. Bony thorax intact. Status post extubation. Life support apparatus otherwise stable. Moderate edema and effusions unchanged. Xr Chest Portable    Result Date: 4/19/2019  EXAMINATION: SINGLE XRAY VIEW OF THE CHEST 4/19/2019 5:51 am COMPARISON: April 18, 2019 HISTORY: ORDERING SYSTEM PROVIDED HISTORY: ETT placement TECHNOLOGIST PROVIDED HISTORY: ETT placement Ordering Physician Provided Reason for Exam: Vent Pt check ETT placement Acuity: Acute Type of Exam: Subsequent/Follow-up Additional signs and symptoms: Vent Pt check ETT placement FINDINGS: Tubes and lines are unchanged. Persistent bibasilar lung opacities and pleural effusions. Mild pulmonary edema. No significant interval change. Xr Chest Portable    Result Date: 4/18/2019  EXAMINATION: SINGLE XRAY VIEW OF THE CHEST 4/18/2019 6:21 am COMPARISON: April 17, 2019 HISTORY: ORDERING SYSTEM PROVIDED HISTORY: ETT placement TECHNOLOGIST PROVIDED HISTORY: ETT placement Ordering Physician Provided Reason for Exam: on vent Acuity: Acute Type of Exam: Initial FINDINGS: Right IJ line and NG tube are stable. Interval advancement of ET tube terminating 3.1 cm from ron. Implanted cardiac device is present. Left-sided effusion and associated airspace disease is stable. Hazy right basilar opacity possibly edema or atelectasis. No pneumothorax. Increased opacity left upper chest possibly focal area of atelectasis, new from prior. Advanced ETT from prior exam, now 3.1 cm from ron. Increased opacity left upper chest suspicious for atelectasis. Additional supporting devices are stable.      Xr Chest Portable    Result Date: 4/17/2019  EXAMINATION: SINGLE XRAY VIEW OF THE CHEST 4/17/2019 7:15 am COMPARISON: 16 April 2019 HISTORY: ORDERING SYSTEM PROVIDED HISTORY: ETT placement TECHNOLOGIST PROVIDED HISTORY: ETT placement Ordering Physician Provided Reason for Exam: on vent Acuity: Acute Type of Exam: Initial FINDINGS: AP portable view of the chest time stamped at 613 hours demonstrates overlying cardiac monitoring electrodes. A right internal jugular catheter terminates in the superior vena cava. Endotracheal tube is somewhat high riding terminating 6.4 cm above the ron. Intestinal tube extends beyond the body of the stomach, tip not included on the exam.  Bipolar pacemaker enters from the left with intact leads in appropriate positions. Heart size is normal.  Left pleural effusion is noted there is continued volume loss in the left hemithorax with stable mild vascular congestion, perihilar opacities, and faint opacity in the right mid and lower lung field. Underlying COPD is noted. Osseous structures are stable. There is little change from prior study. Findings of COPD, mild central vascular congestion, left effusion, and scattered opacities favoring interstitial edema with multifocal pneumonitis not excluded. Tubes and lines as above. However, endotracheal tube is high riding. The findings were sent to the Radiology Results [address] at 7:58 am on 4/17/2019to be communicated to a licensed caregiver. RECOMMENDATION: Suggest advancement of endotracheal tube.      Xr Chest Portable    Result Date: 4/16/2019  EXAMINATION: SINGLE XRAY VIEW OF THE CHEST 4/16/2019 6:54 am COMPARISON: 04/15/2019, 610 hours HISTORY: ORDERING SYSTEM PROVIDED HISTORY: ETT placement TECHNOLOGIST PROVIDED HISTORY: ETT placement Ordering Physician Provided Reason for Exam: on vent Acuity: Acute Type of Exam: Initial 70-year-old female on ventilator; check endotracheal tube placement FINDINGS: Portable AP upright view of the chest. Endotracheal tube distal tip overlying the mid trachea approximately 4.1 cm above the level of the ron. Enteric tube traverses the GE junction with distal tip excluded from the field of view. Left subclavian approach cardiac pacemaker device distal lead tips relatively stable in position. Right internal jugular approach central venous catheter distal tip overlying the high right atrium, stable. Cardiac monitor leads overlie the chest. Atherosclerotic calcification of the thoracic aorta. Slight stable volume loss of the left hemithorax. No pneumothorax. No free air. Dense retrocardiac/left basilar airspace consolidation and small left-sided pleural effusion. Stable mild focal opacity at the right mid lung zone. Underlying COPD. Stable mild pulmonary vascular congestion and left-sided predominant parahilar opacity. Visualized osseous structures remain unchanged. 1. Stable multifocal airspace disease as detailed above with dense retrocardiac/left basilar airspace consolidation and small left-sided pleural effusion. Mild pulmonary vascular congestion. Findings may represent edema or multifocal pneumonia. 2. Underlying COPD. 3. Tubes and line as detailed above. Xr Chest Portable    Result Date: 4/15/2019  EXAMINATION: SINGLE XRAY VIEW OF THE CHEST 4/15/2019 6:47 am COMPARISON: 14 April 2019 HISTORY: ORDERING SYSTEM PROVIDED HISTORY: ETT placement TECHNOLOGIST PROVIDED HISTORY: ETT placement Ordering Physician Provided Reason for Exam: on vent Acuity: Acute Type of Exam: Initial FINDINGS: AP portable view of the chest time stamped at 612 hours demonstrates overlying cardiac monitoring electrodes. Endotracheal tube terminates 4 cm above the ron. Bipolar pacemaker enters from the left with intact leads in appropriate positions. Intestinal tube extends beyond the fundus of the stomach, tip not included. Right internal jugular catheter terminates at the cavoatrial junction. Heart size is normal.  Aortic arch is calcified.   There is interval improvement in vascular congestion with resolution of perihilar opacities. Some bibasilar opacities remain. No extrapleural air is noted. Osseous structures are stable. Interval improvement in vascular congestion and bilateral opacities consistent with resolving pulmonary edema. Tubes and lines as above. Xr Chest Portable    Result Date: 4/14/2019  EXAMINATION: SINGLE XRAY VIEW OF THE CHEST 4/14/2019 7:57 am COMPARISON: Portable chest 04/13/2019. HISTORY: ORDERING SYSTEM PROVIDED HISTORY: Intubation TECHNOLOGIST PROVIDED HISTORY: Intubation Ordering Physician Provided Reason for Exam: intubation Acuity: Acute Type of Exam: Initial Additional signs and symptoms: intubation FINDINGS: Endotracheal tube terminates over the midthoracic trachea. Dual-chamber pacemaker leads appear unchanged in position. Right IJ approach central venous catheter unchanged in position. Heart size not substantially changed. Perihilar and basilar opacities further increased. Left pleural effusion increased in size. Findings may reflect pulmonary edema, progressed from yesterday's exam.  Left pleural effusion increased in size.      Vl Upper Extremity Bilateral Venous Duplex    Result Date: 4/22/2019    [facility]  Vascular Upper Extremities Veins Procedure   Patient Name   Sunitha Dwyer     Date of Study           04/22/2019                 Kristi Robins   Date of Birth  1950  Gender                  Female   Age            77 year(s)  Race                       Room Number    2002        Height:                 59.84 inch, 152 cm   Corporate ID # [id number]    Weight:                 102 pounds, 46.3 kg   Patient Acct # [de-identified]   BSA:        1.4 m^2     BMI:       20.03 kg/m^2   MR #           [id number]      Sonographer             Roderick Mario   Accession #    [id number]   Interpreting Physician  Jazmin Whaley   Referring                  Referring Physician     Kevon Farias  Nurse  Practitioner +------------------------------------+----------+---------------+----------+ ! Dist SCV                            ! Yes       ! Yes            ! None      ! +------------------------------------+----------+---------------+----------+ ! Prox Axillary                       ! Yes       ! Yes            ! None      ! +------------------------------------+----------+---------------+----------+ ! Dist Axillary                       ! Yes       ! Yes            ! None      ! +------------------------------------+----------+---------------+----------+ ! Prox Brachial                       !Yes       ! Yes            ! None      ! +------------------------------------+----------+---------------+----------+ ! Dist Brachial                       !Yes       ! Yes            ! None      ! +------------------------------------+----------+---------------+----------+ ! Prox Radial                         !Yes       ! Yes            ! None      ! +------------------------------------+----------+---------------+----------+ ! Dist Radial                         !Yes       ! Yes            ! None      ! +------------------------------------+----------+---------------+----------+ ! Prox Ulnar                          ! Yes       ! Yes            ! None      ! +------------------------------------+----------+---------------+----------+ ! Dist Ulnar                          ! Yes       ! Yes            ! None      ! +------------------------------------+----------+---------------+----------+ ! Basilic at UA                       ! Yes       ! Yes            ! None      ! +------------------------------------+----------+---------------+----------+ ! Basilic at AF                       ! Yes       ! Yes            ! None      ! +------------------------------------+----------+---------------+----------+ ! Yessenia at [address]                       ! Yes       ! Yes            ! None      ! +------------------------------------+----------+---------------+----------+ ! Gideon at UA !None      ! +------------------------------------+----------+---------------+----------+ ! Prox Axillary                       ! Yes       ! Yes            ! None      ! +------------------------------------+----------+---------------+----------+ ! Dist Axillary                       ! Yes       ! Yes            ! None      ! +------------------------------------+----------+---------------+----------+ ! Prox Brachial                       !Yes       ! Yes            ! None      ! +------------------------------------+----------+---------------+----------+ ! Dist Brachial                       !Yes       ! Yes            ! None      ! +------------------------------------+----------+---------------+----------+ ! Prox Radial                         !Yes       ! Yes            ! None      ! +------------------------------------+----------+---------------+----------+ ! Dist Radial                         !Yes       ! Yes            ! None      ! +------------------------------------+----------+---------------+----------+ ! Prox Ulnar                          ! Yes       ! Yes            ! None      ! +------------------------------------+----------+---------------+----------+ ! Dist Ulnar                          ! Yes       ! Yes            ! None      ! +------------------------------------+----------+---------------+----------+ ! Basilic at UA                       ! Yes       ! Yes            ! None      ! +------------------------------------+----------+---------------+----------+ ! Cephalic at UA                      ! Yes       ! Yes            ! None      ! +------------------------------------+----------+---------------+----------+ ! Cephalic at AF                      ! Yes       ! Yes            ! None      ! +------------------------------------+----------+---------------+----------+ Doppler Measurements +-------------------------+-----------------------+------------------------+ ! Location                 ! Signal                 !Reflux ! +-------------------------+-----------------------+------------------------+ ! IJV                      ! Phasic                 !                        ! +-------------------------+-----------------------+------------------------+ ! SCV                      ! Phasic                 !                        ! +-------------------------+-----------------------+------------------------+ ! Axillary                 ! Phasic                 !                        ! +-------------------------+-----------------------+------------------------+ ! Brachial                 !Phasic                 !                        ! +-------------------------+-----------------------+------------------------+    Vl Dup Lower Extremity Venous Bilateral    Result Date: 5/7/2019    [facility]  Vascular Lower Extremities DVT Study Procedure   Patient Name   [facility]     Date of Study           05/07/2019                 Vdiya Montalvo   Date of Birth  1950  Gender                  Female   Age            77 year(s)  Race                       Room Number    2123        Height:                 59.84 inch, 152 cm   Corporate ID # [id number]    Weight:                 102 pounds, 46.3 kg   Patient Acct # [de-identified]   BSA:        1.4 m^2     BMI:      20.03 kg/m^2   MR #           [id number]      Sonographer             Susana Okeefe, T   Accession #    [id number]   Interpreting Physician  Sugey Allan   Referring                  Referring Physician     Debbie Mclain MD  Nurse  Practitioner  Procedure Type of Study:   Veins: Lower Extremities DVT Study, Venous Scan Lower Bilateral.  Indications for Study:Pain and swelling. Patient Status: In Patient. Technical Quality:Limited visualization. Limitation reason:patient movement. Conclusions   Summary   No evidence of superficial or deep venous thrombosis in both lower  extremities. Technically limited visualization.    Signature ----------------------------------------------------------------  Electronically signed by Christopher Rodas RVT(Sonographer) on  05/07/2019 01:22 PM  ----------------------------------------------------------------   ----------------------------------------------------------------  Electronically signed by Tiajuana Baumgarten Farooq(Interpreting  physician) on 05/07/2019 06:45 PM  ----------------------------------------------------------------  Findings:   Right Impression:                    Left Impression:  The common femoral, femoral,         The common femoral, femoral,  popliteal and tibial veins           popliteal and tibial veins  demonstrate normal compressibility   demonstrate normal compressibility  and augmentation. and augmentation. Limited visualization of the calf    Limited visualization of the calf  veins. veins. Normal compressibility of the great  Normal compressibility of the great  saphenous vein. saphenous vein. Normal compressibility of the small  Normal compressibility of the small  saphenous vein. saphenous vein. Velocities are measured in cm/s ; Diameters are measured in cm Right Lower Extremities DVT Study Measurements Right 2D Measurements +------------------------------------+----------+---------------+----------+ ! Location                            ! Visualized! Compressibility! Thrombosis! +------------------------------------+----------+---------------+----------+ ! Common Femoral                      !Yes       ! Yes            ! None      ! +------------------------------------+----------+---------------+----------+ ! Prox Femoral                        !Yes       ! Yes            ! None      ! +------------------------------------+----------+---------------+----------+ ! Mid Femoral                         !Yes       ! Yes            ! None      ! +------------------------------------+----------+---------------+----------+ ! Dist Femoral                        !Yes       ! Yes            ! None      ! +------------------------------------+----------+---------------+----------+ ! Deep Femoral                        !Yes       ! Yes            ! None      ! +------------------------------------+----------+---------------+----------+ ! Popliteal                           !Yes       ! Yes            ! None      ! +------------------------------------+----------+---------------+----------+ ! Sapheno Femoral Junction            ! Yes       ! Yes            ! None      ! +------------------------------------+----------+---------------+----------+ ! PTV                                 ! Partial   !Yes            ! None      ! +------------------------------------+----------+---------------+----------+ ! Peroneal                            !Partial   !Yes            ! None      ! +------------------------------------+----------+---------------+----------+ ! Gastroc                             ! Yes       ! Yes            ! None      ! +------------------------------------+----------+---------------+----------+ ! GSV Thigh                           ! Yes       ! Yes            ! None      ! +------------------------------------+----------+---------------+----------+ ! GSV Knee                            ! Yes       ! Yes            ! None      ! +------------------------------------+----------+---------------+----------+ ! GSV Ankle                           ! Yes       ! Yes            ! None      ! +------------------------------------+----------+---------------+----------+ ! SSV                                 ! Partial   !Yes            ! None      ! +------------------------------------+----------+---------------+----------+ Left Lower Extremities DVT Study Measurements Left 2D Measurements +------------------------------------+----------+---------------+----------+ ! Location !Visualized! Compressibility! Thrombosis! +------------------------------------+----------+---------------+----------+ ! Common Femoral                      !Yes       ! Yes            ! None      ! +------------------------------------+----------+---------------+----------+ ! Prox Femoral                        !Yes       ! Yes            ! None      ! +------------------------------------+----------+---------------+----------+ ! Mid Femoral                         !Yes       ! Yes            ! None      ! +------------------------------------+----------+---------------+----------+ ! Dist Femoral                        !Yes       ! Yes            ! None      ! +------------------------------------+----------+---------------+----------+ ! Deep Femoral                        !Yes       ! Yes            ! None      ! +------------------------------------+----------+---------------+----------+ ! Popliteal                           !Yes       ! Yes            ! None      ! +------------------------------------+----------+---------------+----------+ ! Sapheno Femoral Junction            ! Yes       ! Yes            ! None      ! +------------------------------------+----------+---------------+----------+ ! PTV                                 ! Partial   !Yes            ! None      ! +------------------------------------+----------+---------------+----------+ ! Peroneal                            !Partial   !Yes            ! None      ! +------------------------------------+----------+---------------+----------+ ! Gastroc                             ! Yes       ! Yes            ! None      ! +------------------------------------+----------+---------------+----------+ ! GSV Thigh                           ! Yes       ! Yes            ! None      ! +------------------------------------+----------+---------------+----------+ ! GSV Knee                            ! Yes       ! Yes            ! None      ! +------------------------------------+----------+---------------+----------+ ! GSV Ankle                           ! Yes       ! Yes            ! None      ! +------------------------------------+----------+---------------+----------+ ! SSV                                 ! Partial   !Yes            ! None      ! +------------------------------------+----------+---------------+----------+    Ir Cholecystostomy Percutaneous Complete    Result Date: 4/22/2019  PROCEDURE: ULTRASOUND and fluoroscopic GUIDED CHOLECYSTOSTOMY TUBE PLACEMENT April 22, 2019 HISTORY: ORDERING SYSTEM PROVIDED HISTORY: acute cholecystitis TECHNOLOGIST PROVIDED HISTORY: acute cholecystitis SEDATION: 75 mcg IV fentanyl for pain TECHNIQUE: Informed consent was obtained after a detailed explanation of the procedure including risks, benefits, and alternatives. Universal protocol was followed. A suitable skin site was prepped and draped in sterile fashion following ultrasound localization. An 18 gauge needle was advanced under ultrasound guidance into the gallbladder via transhepatic route and a 0.035 guidewire was used to place a 8 [place] cholecystostomy tube under fluoroscopic guidance. The catheter was sutured to the skin and the patient tolerated the procedure well. Thick bilious material was sent for laboratory analysis. The catheter was attached to gravity drainage. FINDINGS: Ultrasound image demonstrates distended gallbladder. Bilious fluid was sent for culture. Successful placement of transhepatic 8 [place] percutaneous cholecystostomy tube. Nm Hepatobiliary Scan W Ejection Fraction    Result Date: 4/20/2019  EXAMINATION: NUCLEAR MEDICINE HEPATOBILIARY SCINTIGRAPHY (HIDA SCAN). 4/20/2019 2:15 pm TECHNIQUE: Approximately 5.6 [id number] Mebrofenin (Choletec) was administered IV. Then, dynamic images of the abdomen were obtained in the anterior projection for 60 mins. A right lateral view was also obtained at 60 mins.  Delayed images the bowel which appears to be in the colon. Subtle findings are limited. Consider follow-up radiograph of the abdomen in 6-8 hours. Ir Place Ng Tube By Dr Daya Alves    Result Date: 5/9/2019  PROCEDURE: XR PLACE NASOGASTRIC TUBE PHYS 5/9/2019 HISTORY: ORDERING SYSTEM PROVIDED HISTORY: ileus TECHNOLOGIST PROVIDED HISTORY: ileus Ordering Physician Provided Reason for Exam: ILEUS Acuity: Unknown Type of Exam: Unknown CONTRAST: None SEDATION: None FLUOROSCOPY DOSE AND TYPE OR TIME AND EXPOSURES: 21 seconds; D AP 46 DESCRIPTION OF PROCEDURE AND FINDINGS: This procedure was performed by Dr. Jahaira Zavala. Under intermittent fluoroscopic guidance a 12 Nepalese nasogastric tube was placed through the right nostril and directed under fluoroscopy into the stomach. A small amount of air was injected confirming the tip location in the stomach. Partially visualized cholecystostomy tube and pacer wires. Tube was secured in place to the patient's nose with an adhesive dressing. 12 Nepalese nasogastric tube placed successfully with its tip in the stomach. Physical Examination:        Physical Exam   Constitutional: She is oriented to person, place, and time. No distress. Cachectic, lying in bed, does not appear in distress   Eyes: Conjunctivae are normal.   Neck: Neck supple. Cardiovascular: Regular rhythm, normal heart sounds and intact distal pulses. Pulmonary/Chest: Effort normal and breath sounds normal. No respiratory distress. She exhibits no tenderness. Abdominal: Soft. Bowel sounds are normal. There is no tenderness. Musculoskeletal: She exhibits no edema or tenderness. Neurological: She is alert and oriented to person, place, and time. Left sided hemiparesis  - chronic      Skin: Skin is warm and dry. Capillary refill takes less than 2 seconds. Psychiatric:   Becomes anxious when talking about OR   Vitals reviewed.     Vitals:    05/14/19 1810 05/14/19 2005 05/15/19 0657 05/15/19 0725   BP: 135/64

## 2019-05-15 NOTE — ANESTHESIA POSTPROCEDURE EVALUATION
POST-ANESTHESIA EVALUATION       Pt Name: Chet Chang  MRN: [id number]  YOB: 1950  Date of evaluation: 5/15/2019  Time:  6:11 PM      BP (!) 94/49   Pulse 109   Temp 97.9 °F (36.6 °C)   Resp 21   Ht 5' (1.524 m)   Wt 108 lb 7.5 oz (49.2 kg)   SpO2 99%   BMI 21.18 kg/m²      Consciousness Level  []  Awake  [x]  Sedated but arouseable  []  Deeply sedated  Cardiopulmonary Status  [x]  Stable with patent airway    []  OTHER  Pain Adequately Treated [x]   YES   []  NO    Nausea / Vomiting  []   YES   [x]  NO  Adequate Hydration  [x]   YES   []  NO  Anesthesia Related Complications []   YES   [x]  NO      Electronically signed by Misha Quinones MD on 5/15/2019 at 8 [facility]      Department of Anesthesiology  Postprocedure Note    Patient: Chet Chang  MRN: [id number]  YOB: 1950  Date of evaluation: 5/15/2019  Time:  6:11 PM     Procedure Summary     Date:  05/15/19 Room / Location:  [address] [place] [address] Stefan Thao 02 / [address]    Anesthesia Start:  1330 Anesthesia Stop:  1635    Procedures:       LAPAROSCOPY EXPLORATORY CONVERTED TO EXPLORATORY LAPAROTOMY/ RIGHT COLON RESECTION AND ANASTAMOSIS/ OPEN CHOLECYSTECTOMY/ EXTENSIVE LYSIS OF ADHESIONS (N/A Abdomen)      CHOLECYSTECTOMY (N/A Abdomen) Diagnosis:  (CHRONIC CHOLECYSTITIS)    Surgeon:  Abigail Olivarez DO Responsible Provider:  Ana Cristina Canales MD    Anesthesia Type:  general ASA Status:  3          Anesthesia Type: general    Kalyan Phase I: Kalyan Score: 8    Kalyan Phase II:      Last vitals: Reviewed and per EMR flowsheets.        Anesthesia Post Evaluation

## 2019-05-15 NOTE — ANESTHESIA PRE PROCEDURE
(EFFEXOR) 37.5 MG tablet Take 37.5 mg by mouth 3 times daily. Yes Historical Provider, MD Nicholas. Devices [facility]) [address] wheelchair with left fupper extremity support  Dx: stroke with left hemiparesis.  7/24/17   Clay Gutierrez MD       Current medications:    Current Facility-Administered Medications   Medication Dose Route Frequency Provider Last Rate Last Dose    [MAR Hold] LORazepam (ATIVAN) injection 1 mg  1 mg Intravenous Once PRN Bhargav Mcrae MD        lactated ringers infusion   Intravenous Continuous Griffin Padilla  mL/hr at 05/15/19 1140      clindamycin (CLEOCIN) 900 mg in dextrose 5 % 50 mL IVPB  900 mg Intravenous Once Howie Estrada DO        clindamycin (CLEOCIN) 900 MG/6ML injection             [MAR Hold] haloperidol lactate (HALDOL) injection 2 mg  2 mg Intravenous Q4H PRN Zoran Mari MD   2 mg at 05/12/19 2221    [MAR Hold] metoprolol (LOPRESSOR) injection 5 mg  5 mg Intravenous Q12H Mario Sidhu MD   5 mg at 05/15/19 0812    [MAR Hold] pantoprazole (PROTONIX) injection 40 mg  40 mg Intravenous Daily Mario Sidhu MD   40 mg at 05/15/19 0812    And    [MAR Hold] sodium chloride (PF) 0.9 % injection 10 mL  10 mL Intravenous Daily Mario Sidhu MD   10 mL at 05/15/19 0812    [MAR Hold] dextrose 5 % and 0.45 % NaCl with KCl 20 mEq infusion   Intravenous Continuous Zoran Mari  mL/hr at 05/14/19 1341      [MAR Hold] alteplase (CATHFLO) injection 1 mg  1 mg Intracatheter Once AGAPITO Castorena - CNP        [MAR Hold] levalbuterol [facility]) nebulizer solution 1.25 mg  1.25 mg Nebulization Q4H Zoran Mari MD   1.25 mg at 05/15/19 0657    [MAR Hold] ipratropium (ATROVENT) 0.02 % nebulizer solution 0.5 mg  0.5 mg Nebulization Q4H Zoran Mari MD   0.5 mg at 05/15/19 0657    [MAR Hold] heparin (porcine) injection 5,000 Units  5,000 Units Subcutaneous BID Zoran Mari MD   5,000 Units at 05/11/19 2002    [MAR Hold] meropenem (MERREM) 1 g in sodium chloride 0.9 % 100 mL IVPB (mini-bag)  1 g Intravenous Q8H Robert Mario MD   Stopped at 05/15/19 0925    [MAR Hold] diatrizoate meglumine-sodium (GASTROGRAFIN) 66-10 % solution 360 mL  360 mL Oral ONCE PRN Howie Layneerick, DO   360 mL at 05/09/19 1031    [MAR Hold] sodium chloride (OCEAN, BABY AYR) 0.65 % nasal spray 1 spray  1 spray Each Nare Q2H PRN Bon Latif, APRN - CNP        [facility] Rosas Hold] sodium chloride flush 0.9 % injection 10 mL  10 mL Intravenous PRN Nicolas Saenz MD   10 mL at 05/08/19 1350    [MAR Hold] morphine (PF) injection 2 mg  2 mg Intravenous Q4H PRN Job Scheuermann, MD   2 mg at 05/15/19 0431    [MAR Hold] LORazepam (ATIVAN) injection 0.5 mg  0.5 mg Intravenous Q6H PRN Nicolas Saenz MD   0.5 mg at 05/15/19 0506    [MAR Hold] ondansetron (ZOFRAN) injection 4 mg  4 mg Intravenous Q6H PRN Nicolas Saenz MD   4 mg at 05/14/19 1051    [MAR Hold] zolpidem (AMBIEN) tablet 5 mg  5 mg Oral Nightly PRN Nicolas Saenz MD   5 mg at 05/04/19 2326    [MAR Hold] sodium chloride flush 0.9 % injection 10 mL  10 mL Intravenous PRN Nicolas Saenz MD   10 mL at 05/03/19 1408    [MAR Hold] mometasone-formoterol (DULERA) 200-5 MCG/ACT inhaler 2 puff  2 puff Inhalation BID Nicolas Saenz MD   2 puff at 05/14/19 2034    [Held by provider] metoprolol tartrate (LOPRESSOR) tablet 12.5 mg  12.5 mg Oral BID Nicolas Saenz MD   12.5 mg at 05/07/19 2103    [MAR Hold] hydrOXYzine (ATARAX) tablet 50 mg  50 mg Oral 4x Daily PRN Nicolas Saenz MD   50 mg at 04/27/19 0538    [MAR Hold] metoprolol (LOPRESSOR) injection 10 mg  10 mg Intravenous Q4H PRN Nicolas Saenz MD   10 mg at 05/12/19 1039    [MAR Hold] magnesium sulfate 1 g in dextrose 5% 100 mL IVPB  1 g Intravenous Once Everardo José MD        [MAR Hold] insulin lispro (HUMALOG) injection vial 0-12 Units  0-12 Units Subcutaneous Q6H Nicolas Saenz MD   2 Units at 05/11/19 0024    [MAR Hold] sodium chloride nebulizer 0.9 % solution 3 mL  3 mL Nebulization Q8H KYLE Padilla MD        [facility] Hold] fentaNYL (SUBLIMAZE) injection 50 mcg  50 mcg Intravenous Q2H PRN Nicolas Saenz MD   50 mcg at 05/13/19 0907    [MAR Hold] oxyCODONE-acetaminophen (PERCOCET) 5-325 MG per tablet 1 tablet  1 tablet Oral Q4H PRN Nicolas Saenz MD   1 tablet at 05/08/19 0527    [MAR Hold] magnesium sulfate 1 g in dextrose 5% 100 mL IVPB  1 g Intravenous PRN Tamara Padilla MD   Stopped at 04/17/19 0857    [MAR Hold] sodium phosphate 7.41 mmol in dextrose 5 % 250 mL IVPB  0.16 mmol/kg Intravenous PRN Tamara Padilla MD   Stopped at 04/17/19 2008    Or    [MAR Hold] sodium phosphate 14.85 mmol in dextrose 5 % 250 mL IVPB  0.32 mmol/kg Intravenous PRN Tamara Padilla MD   Stopped at 04/17/19 1231    [MAR Hold] potassium chloride (KLOR-CON M) extended release tablet 40 mEq  40 mEq Oral PRN Nicolas Saenz MD   40 mEq at 05/03/19 1027    Or    [MAR Hold] potassium bicarb-citric acid (EFFER-K) effervescent tablet 40 mEq  40 mEq Oral PRN Nicolas Saenz MD   40 mEq at 04/21/19 0703    Or    [MAR Hold] potassium chloride 10 mEq/100 mL IVPB (Peripheral Line)  10 mEq Intravenous PRN Nicolas Saenz  mL/hr at 05/12/19 1020 10 mEq at 05/12/19 1020    [MAR Hold] glucose (GLUTOSE) 40 % oral gel 15 g  15 g Oral KYLE Padilla MD        [MAR Hold] dextrose 50 % solution 12.5 g  12.5 g Intravenous KYLE Padilla MD        [MAR Hold] glucagon (rDNA) injection 1 mg  1 mg Intramuscular PRLAURA Padilla MD        [MAR Hold] dextrose 5 % solution  100 mL/hr Intravenous PRLAURA Padilla MD        [MAR Hold] milk and molasses enema 240 mL  240 mL Rectal Daily PRN Nicolas Saenz MD   240 mL at 04/12/19 1745    [MAR Hold] venlafaxine (EFFEXOR) tablet 37.5 mg  37.5 mg Oral TID Nicolas Saenz MD   37.5 mg at 05/14/19 2034    [MAR Hold] aspirin EC tablet 81 mg  81 mg Oral Daily Nicolas Saenz MD   81 mg at 05/13/19 0909    [MAR Hold] sodium chloride flush 0.9 % injection 10 mL  10 mL Intravenous 2 times per day Tamara Padilla MD 10 mL at 05/15/19 0822    [MAR Hold] sodium chloride flush 0.9 % injection 10 mL  10 mL Intravenous PRN Nicolas Saenz MD   10 mL at 04/20/19 1623    [MAR Hold] acetaminophen (TYLENOL) tablet 650 mg  650 mg Oral Q4H PRN Nicolas Saenz MD   650 mg at 04/27/19 0530       Allergies: Allergies   Allergen Reactions    Penicillins Shortness Of Breath and Rash    Amoxicillin        Problem List:    Patient Active Problem List   Diagnosis Code    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    CVA (cerebral vascular accident) ([facility] [address] I63.9    Abnormal computed tomography of cervical spine R93.7    Weight loss R63.4    Functional gait abnormality R26.89    Left knee pain M25.562    Knee pain, left M25.562    Acute pain of left knee M25.562    Sepsis due to urinary tract infection (HCC) A41.9, N39.0    Cystitis N30.90    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    Septic shock (HCC) A41.9, R65.21    Leukemoid reaction D72.823    Aspiration pneumonia of right lower lobe due to vomit (HCC) J69.0    MRSA carrier Z22.322    Urinary retention R33.9    Abdominal distention R14.0    Elevated CEA R97.0    Elevated CA 19-9 level R97.8    Cholecystitis K81.9    CRP elevated R79.82    Elevated procalcitonin R79.89    Bandemia D72.825    Centrilobular emphysema (HCC) J43.2    Hemorrhage of rectum and anus K62.5    GI bleed K92.2    Anemia D64.9    Small bowel obstruction (HCC) K56.609    Anxiety disorder F41.9    Noncompliance Z91.19       Past Medical History:        Diagnosis Date    Abnormal computed tomography of cervical spine     sclerotic bone appearance     CVA (cerebral vascular accident) ([facility] [address]     left  side weakness    GERD (gastroesophageal reflux disease)     Hypertension     Paraproteinemia     Weight loss        Past Surgical History:        Procedure Laterality Date    INTUBATION  4/14/2019         PACEMAKER PLACEMENT  07/2011    Pacemaker is Medtronic Revo (compatible). Leads placed in 1995 are NOT MRI compatible. Placed at [facility]. V's per Dr. Bryson Sam can not have an MRI.  UPPER GASTROINTESTINAL ENDOSCOPY N/A 4/16/2019    EGD ESOPHAGOGASTRODUODENOSCOPY @ BEDSIDE  ICU 2002 performed by Anival Yates MD at [address] [place] [address] History:    Social History     Tobacco Use    Smoking status: Current Some Day Smoker     Packs/day: 1.00     Years: 30.00     Pack years: 30.00    Smokeless tobacco: Never Used   Substance Use Topics    Alcohol use: Not Currently     Alcohol/week: 16.8 oz     Types: 28 Glasses of wine per week                                Ready to quit: Not Answered  Counseling given: Not Answered      Vital Signs (Current):   Vitals:    05/15/19 0657 05/15/19 0725 05/15/19 1055 05/15/19 1122   BP:  106/74 112/68 114/64   Pulse:  92 80 91   Resp: 18 18 18 18   Temp:  98.6 °F (37 °C) 98.2 °F (36.8 °C) 98.1 °F (36.7 °C)   TempSrc:  Axillary Oral Oral   SpO2: 100% 96%  98%   Weight:       Height:                                                  BP Readings from Last 3 Encounters:   05/15/19 114/64   04/02/19 129/81   03/30/19 120/69       NPO Status: Time of last liquid consumption: 2359                        Time of last solid consumption: 2359                        Date of last liquid consumption: 05/14/19                        Date of last solid food consumption: 05/14/19    BMI:   Wt Readings from Last 3 Encounters:   05/14/19 110 lb 7.2 oz (50.1 kg)   03/30/19 89 lb (40.4 kg)   03/28/19 94 lb 1.6 oz (42.7 kg)     Body mass index is 21.57 kg/m².     CBC:   Lab Results   Component Value Date    WBC 9.8 05/15/2019    RBC 3.19 05/15/2019    RBC 3.66 05/23/2012    HGB 8.8 05/15/2019    HCT 27.7 05/15/2019    MCV 86.8 05/15/2019    RDW 16.9 05/15/2019     05/15/2019     05/23/2012       CMP:   Lab Results   Component Value Date     05/15/2019    K 3.5 05/15/2019     05/15/2019    CO2 22 05/15/2019    BUN 5 05/15/2019    CREATININE <0.40 05/15/2019    GFRAA CANNOT BE CALCULATED 05/15/2019    LABGLOM CANNOT BE CALCULATED 05/15/2019    GLUCOSE 92 05/15/2019    GLUCOSE 87 05/21/2012    PROT 5.9 05/11/2019    CALCIUM 7.0 05/15/2019    BILITOT 0.38 05/11/2019    ALKPHOS 196 05/11/2019    AST 19 05/11/2019    ALT 18 05/11/2019       POC Tests:   Recent Labs     05/15/19  0618   POCGLU 91       Coags:   Lab Results   Component Value Date    PROTIME 15.6 05/15/2019    PROTIME 10.9 03/28/2012    INR 1.2 05/15/2019    APTT 32.3 05/15/2019       HCG (If Applicable): No results found for: PREGTESTUR, PREGSERUM, HCG, HCGQUANT     ABGs:   Lab Results   Component Value Date    PHART 7.370 05/10/2019    PO2ART 68.2 05/10/2019    RWR0HWD 37.1 05/10/2019    SXE7MDQ 21.4 05/10/2019    J8DYMOYB 92.6 05/10/2019        Type & Screen (If Applicable):  No results found for: LABABO, [address]    Anesthesia Evaluation  Patient summary reviewed and Nursing notes reviewed  Airway: Mallampati: II  TM distance: >3 FB   Neck ROM: full  Mouth opening: > = 3 FB Dental:    (+) upper dentures, lower dentures and partials      Pulmonary: breath sounds clear to auscultation  (+) pneumonia:  COPD:  current smoker                           Cardiovascular:    (+) hypertension:, pacemaker: pacemaker,       ECG reviewed  Rhythm: regular  Rate: normal  Echocardiogram reviewed                  Neuro/Psych:   (+) CVA:, psychiatric history:             ROS comment: Left sided weakness GI/Hepatic/Renal:   (+) GERD:,          ROS comment: Small bowel obstruction. Endo/Other:                     Abdominal:           Vascular:                                      Anesthesia Plan      general     ASA 3     (LV EF 40%)  Induction: intravenous. MIPS: Postoperative opioids intended and Prophylactic antiemetics administered. Anesthetic plan and risks discussed with patient. Plan discussed with CRNA.                   Viky Rodgers MD   5/15/2019

## 2019-05-15 NOTE — OP NOTE
Operative Note      PATIENT NAME:  [facility]               MEDICAL RECORD NO. [id number]                DATE OF PROCEDURE:  5/15/2019  PRIMARY CARE PHYSICIAN:  Amber Cheney DO  PREOPERATIVE DIAGNOSIS:  Chronic cholecystitis. Small bowel obstruction due to adhesions and internal hernia  POSTOPERATIVE DIAGNOSIS:  Same  PROCEDURE PERFORMED:  Diagnostic laparoscopy. Exploratory laparotomy. Extensive enterolysis. Right colectomy with ileocolic anastomosis. Open cholecystectomy. Excision of small bowel diverticulum    SURGEON:  Howie Estrada DO, FACS  ANESTHESIA:  Gen. BLOOD LOSS:  100 ml. SPECIMENS:  Right:.  Gallbladder. Small bowel diverticulum. COMPLICATIONS:  None immediately appreciated. INDICATION FOR PROCEDURE:  Alicia Suarez is a 77y.o. year old female who was admitted with chronic cholecystitis. She has multiple comorbidities and was not a good surgical candidate. Therefore she had a cholecystostomy tube placed. She developed multiple medical problems including small bowel obstruction and this failed to respond to conservative management. Ultimately we decided that surgical intervention would be in the patient's best interest.  We discussed this with the patient who eventually agreed to have surgery. The procedure, risks, benefits, and possible complications were explained to the patient and informed consent was obtained. The patient was brought to the operating room on 5/15/2019. PROCEDURE:   The patient received preoperative antibiotic, DVT and GI prophylaxis. She was taken to the operating room, placed on the operative table in the supine position. General anesthetic was administered and the patient was intubated. The operative site was prepped and draped in the usual sterile fashion. Time out was called.   An infraumbilical incision was made and deepened through the subcu tissue to the level of the fascia which was grabbed with coworkers and opened gaining entrance into the abdominal cavity. A balloon trocar was inserted and the balloon was inflated. Pneumoperitoneum was established with CO2 gas to a pressure 15 mmHg. The patient's was placed in the reverse Trendelenburg position and the abdomen was explored. Immediately upon placing the scope and visualizing the abdomen it was apparent that the patient had extensive adhesions including adhesions of the transverse colon into the abdominal wall which was obscuring are visualization of the right liver. I did not think that the laparoscopic approach would be feasible and decision was made to convert to open procedure. The scope was removed. The balloon trocar was removed and the patient was returned into the supine position. A midline incision was then made and deepened through the subcu interstitial to the level of the fascia which was opened up gaining entry into the abdominal cavity. Immediately upon entering the abdomen we noticed extensive adhesions of the transverse colon to the liver. This was taken down in a stepwise fashion using electrocautery as well as sharp dissection. We noticed that the cholecystostomy tube was traversing the transverse colon and terminating in the gallbladder. At this point decision was made to proceed with right colectomy. Before we got to that extensive lysis of adhesions was performed. The patient was noted to have an internal hernia as well as a proximal small bowel diverticulum about a foot away from the ligament of Treitz that was acting as a lead point causing the bowel to loop around it. We proceeded with lysis of adhesions which was done in systemic fashion starting at the ligament of Treitz and going all the way to the ileocecal valve. The lysis of adhesions was done sharply with scissors as well as electrocautery. The entire small bowel was freed with no serosal tears or enterotomies. At this point the diverticulum was excised using a TA stapler.   The staple line was reinforced with 3-0 silk Lembert sutures. The lumen was patent. The gastrocolic ligament was divided and the transverse colon was divided just proximal to the middle colic artery. This was done with a TIMOTHY stapler after small defect was created in the mesentery. We proceeded with mobilizing the proximal transverse colon as well as the hepatic flexure making sure to identify and protect the duodenum. The descending colon was divided along side the white line of Toldt. Once we have the entire right colon as well as the hepatic flexure and the proximal transverse colon hanging only by the mesentery, we divided it with LigaSure. The distal ileum was divided just proximal to the ileocecal valve using a TIMOTHY stapler. This was done after the mesentery division extended through the ileocolic artery and through the mesentery of the terminal ileum at this point we directed our attention to the gallbladder which was taken down in a dome down fashion. We identified the cystic artery which was clipped and divided. The cystic duct was also identified and clipped placing 2 clips proximal 1 clip distally in the duct was divided. A rather large stone of about a centimeter was noted in the gallbladder neck. Once hemostasis at the liver bed was assured we irrigated the abdomen with 6 L of warm saline taken time to irrigate the gutters as well as the subdiaphragmatic spaces. We continued irrigation and suction until the irrigant was more or less clear. At this point a side-to-side, functional end-to-end ileocolic anastomosis was made using a TIMOTHY stapler after small enterotomies were made in the staple line at the entire mesenteric edge of the 2 sides of the bowel. The resultant enterotomy was closed with a TA stapler and the staple line was reinforced with 3-0 silk Lembert sutures. The crotch of the anastomosis was reinforced with 3-0 silk suture. The mesenteric defect was closed with 3-0 silk suture.   Again we confirmed that the operative field was grossly hemostatic. A 19 [address] Melita Merrill drain was placed in the pelvis, introducing it through a separate stab incision in the right lower quadrant. The drain was secured with 2-0 silk suture. Seprafilm was placed in the pelvis and the abdomen was closed using 0 looped PDS suture in continuous fashion. Subcutaneous tissue was copiously irrigated and the skin was closed with surgical staples placing them about half inch apart and half inch iodoform packing was placed into the subcutaneous tissue between the staples. The incision was washed and dried sterilely and sterile dressings were applied. The patient was kept intubated and was taken to recovery for continued resuscitation and postoperative care. The patient will be admitted to intensive care unit. Instruments, needles, and sponges were counted twice at the end of the procedure and the counts were correct.           Yariel Rowley, [address] [facility]

## 2019-05-16 ENCOUNTER — APPOINTMENT (OUTPATIENT)
Dept: GENERAL RADIOLOGY | Age: 69
DRG: 853 | End: 2019-05-16
Payer: COMMERCIAL

## 2019-05-16 LAB
ALLEN TEST: ABNORMAL
AMYLASE: 65 U/L (ref 28–100)
ANION GAP SERPL CALCULATED.3IONS-SCNC: 12 MMOL/L (ref 9–17)
BUN BLDV-MCNC: 8 MG/DL (ref 8–23)
BUN/CREAT BLD: ABNORMAL (ref 9–20)
CALCIUM SERPL-MCNC: 6.7 MG/DL (ref 8.6–10.4)
CARBOXYHEMOGLOBIN: 1.1 % (ref 0–5)
CHLORIDE BLD-SCNC: 106 MMOL/L (ref 98–107)
CO2: 20 MMOL/L (ref 20–31)
CREAT SERPL-MCNC: <0.4 MG/DL (ref 0.5–0.9)
CULTURE: NORMAL
FIO2: 30
GFR AFRICAN AMERICAN: ABNORMAL ML/MIN
GFR NON-AFRICAN AMERICAN: ABNORMAL ML/MIN
GFR SERPL CREATININE-BSD FRML MDRD: ABNORMAL ML/MIN/{1.73_M2}
GFR SERPL CREATININE-BSD FRML MDRD: ABNORMAL ML/MIN/{1.73_M2}
GLUCOSE BLD-MCNC: 94 MG/DL (ref 70–99)
HCO3 ARTERIAL: 21.2 MMOL/L (ref 22–26)
HCT VFR BLD CALC: 28 % (ref 36–46)
HEMOGLOBIN: 9 G/DL (ref 12–16)
INR BLD: 1.3
LACTIC ACID: 1.9 MMOL/L (ref 0.5–2.2)
Lab: NORMAL
MAGNESIUM: 1.2 MG/DL (ref 1.6–2.6)
MCH RBC QN AUTO: 27.8 PG (ref 26–34)
MCHC RBC AUTO-ENTMCNC: 32 G/DL (ref 31–37)
MCV RBC AUTO: 86.9 FL (ref 80–100)
METHEMOGLOBIN: 0.1 % (ref 0–1.9)
MODE: ABNORMAL
NEGATIVE BASE EXCESS, ART: 3.8 MMOL/L (ref 0–2)
NOTIFICATION TIME: ABNORMAL
NOTIFICATION: ABNORMAL
NRBC AUTOMATED: ABNORMAL PER 100 WBC
O2 DEVICE/FLOW/%: ABNORMAL
O2 SAT, ARTERIAL: 94.7 % (ref 95–98)
OXYHEMOGLOBIN: ABNORMAL % (ref 95–98)
PARTIAL THROMBOPLASTIN TIME: 34.7 SEC (ref 24–36)
PATIENT TEMP: 37
PCO2 ARTERIAL: 34.8 MMHG (ref 35–45)
PCO2, ART, TEMP ADJ: ABNORMAL (ref 35–45)
PDW BLD-RTO: 16.8 % (ref 11.5–14.9)
PEEP/CPAP: 5
PH ARTERIAL: 7.39 (ref 7.35–7.45)
PH, ART, TEMP ADJ: ABNORMAL (ref 7.35–7.45)
PHOSPHORUS: 2.6 MG/DL (ref 2.6–4.5)
PLATELET # BLD: 275 K/UL (ref 150–450)
PMV BLD AUTO: 8.7 FL (ref 6–12)
PO2 ARTERIAL: 70.7 MMHG (ref 80–100)
PO2, ART, TEMP ADJ: ABNORMAL MMHG (ref 80–100)
POSITIVE BASE EXCESS, ART: ABNORMAL MMOL/L (ref 0–2)
POTASSIUM SERPL-SCNC: 3.6 MMOL/L (ref 3.7–5.3)
PROCALCITONIN: 1.12 NG/ML
PROTHROMBIN TIME: 16.3 SEC (ref 11.8–14.6)
PSV: ABNORMAL
PT. POSITION: ABNORMAL
RBC # BLD: 3.22 M/UL (ref 4–5.2)
RESPIRATORY RATE: 21
SAMPLE SITE: ABNORMAL
SET RATE: 16
SODIUM BLD-SCNC: 138 MMOL/L (ref 135–144)
SPECIMEN DESCRIPTION: NORMAL
TEXT FOR RESPIRATORY: ABNORMAL
TOTAL HB: ABNORMAL G/DL (ref 12–16)
TOTAL RATE: 21
VT: 500
WBC # BLD: 33.1 K/UL (ref 3.5–11)

## 2019-05-16 PROCEDURE — 85027 COMPLETE CBC AUTOMATED: CPT

## 2019-05-16 PROCEDURE — 94003 VENT MGMT INPAT SUBQ DAY: CPT

## 2019-05-16 PROCEDURE — 6360000002 HC RX W HCPCS: Performed by: ANESTHESIOLOGY

## 2019-05-16 PROCEDURE — 94761 N-INVAS EAR/PLS OXIMETRY MLT: CPT

## 2019-05-16 PROCEDURE — 85610 PROTHROMBIN TIME: CPT

## 2019-05-16 PROCEDURE — 99233 SBSQ HOSP IP/OBS HIGH 50: CPT | Performed by: INTERNAL MEDICINE

## 2019-05-16 PROCEDURE — 83605 ASSAY OF LACTIC ACID: CPT

## 2019-05-16 PROCEDURE — 84100 ASSAY OF PHOSPHORUS: CPT

## 2019-05-16 PROCEDURE — 99291 CRITICAL CARE FIRST HOUR: CPT | Performed by: INTERNAL MEDICINE

## 2019-05-16 PROCEDURE — 36600 WITHDRAWAL OF ARTERIAL BLOOD: CPT

## 2019-05-16 PROCEDURE — 2580000003 HC RX 258: Performed by: INTERNAL MEDICINE

## 2019-05-16 PROCEDURE — 2580000003 HC RX 258: Performed by: SURGERY

## 2019-05-16 PROCEDURE — 6360000002 HC RX W HCPCS: Performed by: SURGERY

## 2019-05-16 PROCEDURE — 82150 ASSAY OF AMYLASE: CPT

## 2019-05-16 PROCEDURE — 6370000000 HC RX 637 (ALT 250 FOR IP): Performed by: INTERNAL MEDICINE

## 2019-05-16 PROCEDURE — 71045 X-RAY EXAM CHEST 1 VIEW: CPT

## 2019-05-16 PROCEDURE — 6360000002 HC RX W HCPCS: Performed by: STUDENT IN AN ORGANIZED HEALTH CARE EDUCATION/TRAINING PROGRAM

## 2019-05-16 PROCEDURE — 84145 PROCALCITONIN (PCT): CPT

## 2019-05-16 PROCEDURE — P9047 ALBUMIN (HUMAN), 25%, 50ML: HCPCS | Performed by: STUDENT IN AN ORGANIZED HEALTH CARE EDUCATION/TRAINING PROGRAM

## 2019-05-16 PROCEDURE — 82805 BLOOD GASES W/O2 SATURATION: CPT

## 2019-05-16 PROCEDURE — 85730 THROMBOPLASTIN TIME PARTIAL: CPT

## 2019-05-16 PROCEDURE — 2000000000 HC ICU R&B

## 2019-05-16 PROCEDURE — 6360000002 HC RX W HCPCS: Performed by: INTERNAL MEDICINE

## 2019-05-16 PROCEDURE — 87040 BLOOD CULTURE FOR BACTERIA: CPT

## 2019-05-16 PROCEDURE — 80048 BASIC METABOLIC PNL TOTAL CA: CPT

## 2019-05-16 PROCEDURE — 36415 COLL VENOUS BLD VENIPUNCTURE: CPT

## 2019-05-16 PROCEDURE — 94640 AIRWAY INHALATION TREATMENT: CPT

## 2019-05-16 PROCEDURE — C9113 INJ PANTOPRAZOLE SODIUM, VIA: HCPCS | Performed by: SURGERY

## 2019-05-16 PROCEDURE — 83735 ASSAY OF MAGNESIUM: CPT

## 2019-05-16 PROCEDURE — 94770 HC ETCO2 MONITOR DAILY: CPT

## 2019-05-16 PROCEDURE — 2700000000 HC OXYGEN THERAPY PER DAY

## 2019-05-16 RX ORDER — 0.9 % SODIUM CHLORIDE 0.9 %
1000 INTRAVENOUS SOLUTION INTRAVENOUS ONCE
Status: COMPLETED | OUTPATIENT
Start: 2019-05-16 | End: 2019-05-16

## 2019-05-16 RX ORDER — ALBUMIN (HUMAN) 12.5 G/50ML
50 SOLUTION INTRAVENOUS ONCE
Status: COMPLETED | OUTPATIENT
Start: 2019-05-16 | End: 2019-05-16

## 2019-05-16 RX ADMIN — IPRATROPIUM BROMIDE AND ALBUTEROL SULFATE 1 AMPULE: .5; 3 SOLUTION RESPIRATORY (INHALATION) at 14:37

## 2019-05-16 RX ADMIN — HYDROMORPHONE HYDROCHLORIDE 0.5 MG: 1 INJECTION, SOLUTION INTRAMUSCULAR; INTRAVENOUS; SUBCUTANEOUS at 05:34

## 2019-05-16 RX ADMIN — MAGNESIUM SULFATE HEPTAHYDRATE 1 G: 1 INJECTION, SOLUTION INTRAVENOUS at 08:20

## 2019-05-16 RX ADMIN — Medication 10 ML: at 07:58

## 2019-05-16 RX ADMIN — VANCOMYCIN HYDROCHLORIDE 750 MG: 5 INJECTION, POWDER, LYOPHILIZED, FOR SOLUTION INTRAVENOUS at 10:08

## 2019-05-16 RX ADMIN — MAGNESIUM SULFATE HEPTAHYDRATE 1 G: 1 INJECTION, SOLUTION INTRAVENOUS at 05:29

## 2019-05-16 RX ADMIN — ENOXAPARIN SODIUM 30 MG: 30 INJECTION SUBCUTANEOUS at 07:58

## 2019-05-16 RX ADMIN — MAGNESIUM SULFATE HEPTAHYDRATE 1 G: 1 INJECTION, SOLUTION INTRAVENOUS at 06:37

## 2019-05-16 RX ADMIN — MEROPENEM 1 G: 1 INJECTION, POWDER, FOR SOLUTION INTRAVENOUS at 18:05

## 2019-05-16 RX ADMIN — IPRATROPIUM BROMIDE AND ALBUTEROL SULFATE 1 AMPULE: .5; 3 SOLUTION RESPIRATORY (INHALATION) at 10:34

## 2019-05-16 RX ADMIN — PANTOPRAZOLE SODIUM 40 MG: 40 INJECTION, POWDER, FOR SOLUTION INTRAVENOUS at 07:57

## 2019-05-16 RX ADMIN — IPRATROPIUM BROMIDE AND ALBUTEROL SULFATE 1 AMPULE: .5; 3 SOLUTION RESPIRATORY (INHALATION) at 19:34

## 2019-05-16 RX ADMIN — IPRATROPIUM BROMIDE AND ALBUTEROL SULFATE 1 AMPULE: .5; 3 SOLUTION RESPIRATORY (INHALATION) at 07:04

## 2019-05-16 RX ADMIN — HYDROCORTISONE SODIUM SUCCINATE 100 MG: 100 INJECTION, POWDER, FOR SOLUTION INTRAMUSCULAR; INTRAVENOUS at 20:29

## 2019-05-16 RX ADMIN — ALBUMIN (HUMAN) 50 G: 0.25 INJECTION, SOLUTION INTRAVENOUS at 12:27

## 2019-05-16 RX ADMIN — HYDROMORPHONE HYDROCHLORIDE 0.5 MG: 1 INJECTION, SOLUTION INTRAMUSCULAR; INTRAVENOUS; SUBCUTANEOUS at 15:33

## 2019-05-16 RX ADMIN — HYDROCORTISONE SODIUM SUCCINATE 100 MG: 100 INJECTION, POWDER, FOR SOLUTION INTRAMUSCULAR; INTRAVENOUS at 14:16

## 2019-05-16 RX ADMIN — PROPOFOL 15 MCG/KG/MIN: 10 INJECTION, EMULSION INTRAVENOUS at 15:15

## 2019-05-16 RX ADMIN — VANCOMYCIN HYDROCHLORIDE 750 MG: 5 INJECTION, POWDER, LYOPHILIZED, FOR SOLUTION INTRAVENOUS at 20:22

## 2019-05-16 RX ADMIN — MEROPENEM 1 G: 1 INJECTION, POWDER, FOR SOLUTION INTRAVENOUS at 01:43

## 2019-05-16 RX ADMIN — MEROPENEM 1 G: 1 INJECTION, POWDER, FOR SOLUTION INTRAVENOUS at 11:27

## 2019-05-16 RX ADMIN — MAGNESIUM SULFATE HEPTAHYDRATE 1 G: 1 INJECTION, SOLUTION INTRAVENOUS at 07:23

## 2019-05-16 RX ADMIN — SODIUM CHLORIDE 1000 ML: 9 INJECTION, SOLUTION INTRAVENOUS at 09:45

## 2019-05-16 ASSESSMENT — PULMONARY FUNCTION TESTS
PIF_VALUE: 29
PIF_VALUE: 29
PIF_VALUE: 22
PIF_VALUE: 20
PIF_VALUE: 21
PIF_VALUE: 20
PIF_VALUE: 22
PIF_VALUE: 20
PIF_VALUE: 20
PIF_VALUE: 22
PIF_VALUE: 22
PIF_VALUE: 21
PIF_VALUE: 19
PIF_VALUE: 24
PIF_VALUE: 44
PIF_VALUE: 19
PIF_VALUE: 20
PIF_VALUE: 18
PIF_VALUE: 19
PIF_VALUE: 20
PIF_VALUE: 23
PIF_VALUE: 24
PIF_VALUE: 26
PIF_VALUE: 19
PIF_VALUE: 37
PIF_VALUE: 21
PIF_VALUE: 27
PIF_VALUE: 32
PIF_VALUE: 19
PIF_VALUE: 19
PIF_VALUE: 22
PIF_VALUE: 14

## 2019-05-16 ASSESSMENT — PAIN SCALES - GENERAL
PAINLEVEL_OUTOF10: 0
PAINLEVEL_OUTOF10: 2
PAINLEVEL_OUTOF10: 0
PAINLEVEL_OUTOF10: 5

## 2019-05-16 NOTE — PROGRESS NOTES
General Surgery Progress Note            PATIENT NAME: William Patterson     TODAY'S DATE: 5/16/2019, 8:27 AM    SUBJECTIVE:    Pt  Seen and examined. Overnight events noted. On Levophed at 9 mcg/min. OBJECTIVE:   VITALS:  BP (!) 98/51   Pulse 97   Temp 97.8 °F (36.6 °C) (Axillary)   Resp 16   Ht 5' (1.524 m)   Wt 105 lb 2.6 oz (47.7 kg)   SpO2 100%   BMI 20.54 kg/m²      INTAKE/OUTPUT:      Intake/Output Summary (Last 24 hours) at 5/16/2019 0827  Last data filed at 5/16/2019 0539  Gross per 24 hour   Intake 5665.99 ml   Output 2375 ml   Net 3290.99 ml                 CONSTITUTIONAL:  awake and alert. No acute distress  HEART:   Regular   LUNGS:   Diminished   ABDOMEN:   Abdomen soft, moderately tender, mildly distended. Hypoactive BS. Drain SS. Incision intact.  Good urine output  EXTREMITIES:   1+ peripheral edema    Data:  CBC with Differential:    Lab Results   Component Value Date    WBC 33.1 05/16/2019    RBC 3.22 05/16/2019    RBC 3.66 05/23/2012    HGB 9.0 05/16/2019    HCT 28.0 05/16/2019     05/16/2019     05/23/2012    MCV 86.9 05/16/2019    MCH 27.8 05/16/2019    MCHC 32.0 05/16/2019    RDW 16.8 05/16/2019    METASPCT 2 05/15/2019    LYMPHOPCT 7 05/15/2019    MONOPCT 8 05/15/2019    MYELOPCT 1 05/07/2019    BASOPCT 0 05/15/2019    MONOSABS 0.78 05/15/2019    LYMPHSABS 0.69 05/15/2019    EOSABS 0.00 05/15/2019    BASOSABS 0.00 05/15/2019    DIFFTYPE NOT REPORTED 05/15/2019     CMP:    Lab Results   Component Value Date     05/16/2019    K 3.6 05/16/2019     05/16/2019    CO2 20 05/16/2019    BUN 8 05/16/2019    CREATININE <0.40 05/16/2019    GFRAA CANNOT BE CALCULATED 05/16/2019    LABGLOM CANNOT BE CALCULATED 05/16/2019    GLUCOSE 94 05/16/2019    GLUCOSE 87 05/21/2012    PROT 5.9 05/11/2019    LABALBU 1.9 05/11/2019    LABALBU 4.6 05/17/2012    CALCIUM 6.7 05/16/2019    BILITOT 0.38 05/11/2019    ALKPHOS 196 05/11/2019    AST 19 05/11/2019    ALT 18 05/11/2019 AMYLASE:    Lab Results   Component Value Date    AMYLASE 65 05/16/2019         ASSESSMENT     Principal Problem:    GI bleed  Active Problems:    Sepsis due to urinary tract infection (HCC)    Cystitis    Emphysematous cystitis    Septic shock (HCC)    Leukemoid reaction    Aspiration pneumonia of right lower lobe due to vomit (HCC)    MRSA carrier    Urinary retention    Abdominal distention    Elevated CEA    Elevated CA 19-9 level    Cholecystitis    CRP elevated    Elevated procalcitonin    Bandemia    Centrilobular emphysema (HCC)    Hemorrhage of rectum and anus    Anemia    Small bowel obstruction (HCC)    Anxiety disorder    Noncompliance  Resolved Problems:    * No resolved hospital problems. *    S/P right colectomy, cholecystectomy      Plan  1. Will give 1000 cc bolus  2. Wean pressor as tolerated  3. Wean off vent as tolerated  4.  Continue antibiotics per ID        Avinash Tyra, DO [id number]

## 2019-05-16 NOTE — PLAN OF CARE
Problem: Risk for Impaired Skin Integrity  Goal: Tissue integrity - skin and mucous membranes  Description  Structural intactness and normal physiological function of skin and  mucous membranes.   5/16/2019 1728 by Patric Houston RN  Outcome: Ongoing  Note:   No new skin breakdown noted this shift, patient turned every 2 hours   5/16/2019 0612 by Herman Krishnamurthy RN  Outcome: Ongoing     Problem: Falls - Risk of:  Goal: Will remain free from falls  Description  Will remain free from falls  5/16/2019 1728 by Patric Houston RN  Outcome: Ongoing  Note:   Patient remains free from falls this shift, appropriate safety measures in place   5/16/2019 0612 by Herman Krishnamurthy RN  Outcome: Met This Shift  Goal: Absence of physical injury  Description  Absence of physical injury  5/16/2019 1728 by Patric Houston RN  Outcome: Ongoing  5/16/2019 0612 by Herman Krishnamurthy RN  Outcome: Met This Shift     Problem: Infection, Septic Shock:  Goal: Will show no infection signs and symptoms  Description  Will show no infection signs and symptoms  5/16/2019 1728 by Patric Houston RN  Outcome: Ongoing  5/16/2019 0612 by Herman Krishnamurthy RN  Outcome: Ongoing     Problem: Tissue Perfusion, Altered:  Goal: Circulatory function within specified parameters  Description  Circulatory function within specified parameters  5/16/2019 1728 by Patric Houston RN  Outcome: Ongoing  5/16/2019 0612 by Herman Krishnamurthy RN  Outcome: Ongoing     Problem: Pain:  Goal: Pain level will decrease  Description  Pain level will decrease  5/16/2019 1728 by Patric Houston RN  Outcome: Ongoing  Note:   Patient medicated with dilaudid x1 this shift   5/16/2019 0612 by Herman Krishnamurthy RN  Outcome: Ongoing  Goal: Control of acute pain  Description  Control of acute pain  5/16/2019 1728 by Patric Houston RN  Outcome: Ongoing  5/16/2019 0612 by Herman Krishnamurthy RN  Outcome: Ongoing  Goal: Control of chronic pain  Description  Control

## 2019-05-16 NOTE — PROGRESS NOTES
ET moved 4 cm to 25 cm per Dr. Lashonda Hassan order and secured with new Blue.  Et now at right side of mouth

## 2019-05-16 NOTE — PROGRESS NOTES
Nutrition Assessment    Type and Reason for Visit: Reassess    Nutrition Recommendations: Continue NPO. Nutrition Assessment: Pt declining from a nutritional viewpoint as no current nutrition support provided. Pt at risk for further nutrition compromise due to NPO status and increased needs for healing. Will monitor nutrition progression    Malnutrition Assessment:  · Malnutrition Status: At risk for malnutrition  · Context: Acute illness or injury  · Findings of the 6 clinical characteristics of malnutrition (Minimum of 2 out of 6 clinical characteristics is required to make the diagnosis of moderate or severe Protein Calorie Malnutrition based on AND/ASPEN Guidelines):  1. Energy Intake-Less than or equal to 50% of estimated energy requirement, (since 5-13)    2. Weight Loss-Unable to assess(edema present),    3. Fat Loss-Unable to assess(Pt is thin; edema present; no known recent loss of muscle or fat),    4. Muscle Loss-Unable to assess,    5. Fluid Accumulation-Mild fluid accumulation(Mild to moderate), Extremities  6.  Strength-Not measured    Nutrition Risk Level: High    Nutrient Needs:  · Estimated Daily Total Kcal: 8300-3961 based on 35-39 kcal per kg of usual body wt  · Estimated Daily Protein (g): 63-68 based on 1.4-1.5 gm/kg IBW(revised)     Nutrition Diagnosis:   · Problem: Inadequate oral intake  · Etiology: related to Alteration in GI function, Insufficient energy/nutrient consumption     Signs and symptoms:  as evidenced by NPO status due to medical condition, GI abnormality(TPN discontinued)    Objective Information:  · Nutrition-Focused Physical Findings: Hypoactive bowel sounds. Edema: +1 RUE, RLE, LLE; +2 LUE.   · Wound Type: Deep Tissue Injury, Multiple, Stage II, Pressure Ulcer(Wound under cast)  · Current Nutrition Therapies:  · Oral Diet Orders: NPO   · Anthropometric Measures:  · Ht: 5' (152.4 cm)   · Current Body Wt: 105 lb 2.6 oz (47.7 kg)(Edema, Positive Fluid Balance)  · Admission Body Wt: 102 lb (46.3 kg)  · Usual Body Wt: 89 lb 1.1 oz (40.4 kg)(3-30-19)  · Ideal Body Wt: 100 lb (45.4 kg), % Ideal Body 89% (based on wt 5-12)  · BMI Classification: BMI <18.5 Underweight    Nutrition Interventions:   Continue NPO  Continued Inpatient Monitoring    Nutrition Evaluation:   · Evaluation: Goals set(New Goal)   · Goals: Adequate nutritional intake or provision     · Monitoring: Nutrition Progression, Wound Healing, I&O, Weight, Pertinent Labs, Hemodynamic Status, Bowel Function     Cherry Melgoza R.D., L.D.   Phone: [phone]

## 2019-05-16 NOTE — PROGRESS NOTES
ICU Progress Note (Vent)   [facility]    Patient - Lanie Paget,  Age - 77 y.o.    - 1950      Room Number -    N -  [id number]   [facility]  Date of Admission -  2019  2:48 PM     Follow-up: Acute respiratory failure    Events of Past 24 Hours   Had ex lap yesterday with  extensive lysis of adhesions, right colectomy with anastomosis and open cholecystectomy, found to have cholecystostomy tube traversing through the transverse colon, on vent AC 16, 500, +5 of PEEP and 30% FiO2, on Diprivan and Levophed drips  No Fever or chills , not much tracheal secretions  Not Much NG drainage  Adequate urine output    Vitals    height is 5' (1.524 m) and weight is 105 lb 2.6 oz (47.7 kg). Her axillary temperature is 97.8 °F (36.6 °C). Her blood pressure is 98/51 (abnormal) and her pulse is 97. Her respiration is 16 and oxygen saturation is 100%. Temperature Range: Temp: 97.8 °F (36.6 °C) Temp  Av.5 °F (35.8 °C)  Min: 82.6 °F (28.1 °C)  Max: 98.2 °F (36.8 °C)  BP Range:  Systolic (20RXD), XSX:516 , Min:55 , DKK:969     Diastolic (09FVK), TDF:17, Min:18, Max:81    Pulse Range: Pulse  Av  Min: 0  Max: 122  Respiration Range: Resp  Avg: 10.4  Min: 0  Max: 26  Current Pulse Ox[de-identified]  SpO2: 100 %  24HR Pulse Ox Range:  SpO2  Av.4 %  Min: 85 %  Max: 100 %  Oxygen Amount and Delivery: O2 Flow Rate (L/min): 2 L/min      Wt Readings from Last 3 Encounters:   19 105 lb 2.6 oz (47.7 kg)   19 89 lb (40.4 kg)   19 94 lb 1.6 oz (42.7 kg)     I/O       Intake/Output Summary (Last 24 hours) at 2019 0900  Last data filed at 2019 0539  Gross per 24 hour   Intake 5665.99 ml   Output 2375 ml   Net 3290.99 ml     I/O last 3 completed shifts:   In: 5666 [I.V.:5566; IV Piggyback:100]  Out: 7171 [Urine:1310; Drains:665; Blood:400]     DRAIN/TUBE OUTPUT:     Invasive Lines   ETT Day -     Lines -      ICP PRESSURE RANGE:  No data recorded  CVP PRESSURE RANGE:  No data recorded  Mechanical Ventilation Data   SETTINGS (Comprehensive)  Vent Information  $Ventilation: $Subsequent Day  Ventilator Started: Yes  Ventilation Day(s): 2  Equipment ID: [id number]  Equipment Changed: HME  Vent Type: Servo i  Vent Mode: PRVC  Vt Ordered: 500 mL  Rate Set: 16 bmp  Pressure Support: 7 cmH20  FiO2 : 30 %  Sensitivity: 5  PEEP/CPAP: 5  I Time/ I Time %: 0.9 s  Cuff Pressure (cm H2O): 22 cm H2O  Humidification Source: HME  Additional Respiratory  Assessments  Pulse: 97  Resp: 16  SpO2: 100 %  End Tidal CO2: 27 (%)  Position: Semi-Diaz's  Humidification Source: E  Oral Care Completed?: Yes  Oral Care: Mouth suctioned  Subglottic Suction Done?: Yes  Cuff Pressure (cm H2O): 22 cm H2O       ABGs:   Lab Results   Component Value Date    PHART 7.392 05/16/2019    PO2ART 70.7 05/16/2019    KOO7VRJ 34.8 05/16/2019       Lab Results   Component Value Date    MODE PRVC 05/16/2019         Medications   IV   lactated ringers 150 mL/hr at 05/15/19 1920    propofol 10 mcg/kg/min (05/15/19 1920)    norepinephrine 10 mcg/min (05/16/19 0818)      vancomycin (VANCOCIN) intermittent dosing (placeholder)   Other RX Placeholder    vancomycin  750 mg Intravenous Q12H    sodium chloride flush  10 mL Intravenous 2 times per day    enoxaparin  30 mg Subcutaneous Daily    pantoprazole  40 mg Intravenous Daily    And    sodium chloride (PF)  10 mL Intravenous Daily    ipratropium-albuterol  1 ampule Inhalation Q4H WA    meperidine  25 mg Intravenous Once    metoprolol  5 mg Intravenous Q12H    alteplase  1 mg Intracatheter Once    meropenem  1 g Intravenous Q8H    mometasone-formoterol  2 puff Inhalation BID       Diet/Nutrition   Diet NPO Effective Now Exceptions are: Sips with Meds    Exam   VITALS    height is 5' (1.524 m) and weight is 105 lb 2.6 oz (47.7 kg). Her axillary temperature is 97.8 °F (36.6 °C).  Her blood pressure is 98/51 (abnormal) and her pulse is 97. Her respiration is 16 and oxygen saturation is 100%. Ventilator Settings (Basic)  Vent Mode: PRVC Rate Set: 16 bmp/Vt Ordered: 500 mL/ /FiO2 : 30 %    Constitutional - awake alert, on vent  General Appearance  well developed, no acute distress  HEENT - Life support devices in place (ET, NG),normocephalic, atraumatic. PERRLA  Lungs - Chest expands equally, no wheezes, rales , coarse rhonchi. Cardiovascular - Heart sounds are normal.  normal rate and rhythm regular, no murmur, gallop or rub.   Abdomen - soft, little ignacio, nondistended,   Neurologic - awake and alert despite Diprivan drip, following commands  Skin - no bruising or bleeding  Extremities - no cyanosis, clubbing or edema    Lab Results   CBC     Lab Results   Component Value Date    WBC 33.1 05/16/2019    RBC 3.22 05/16/2019    RBC 3.66 05/23/2012    HGB 9.0 05/16/2019    HCT 28.0 05/16/2019     05/16/2019     05/23/2012    MCV 86.9 05/16/2019    MCH 27.8 05/16/2019    MCHC 32.0 05/16/2019    RDW 16.8 05/16/2019    METASPCT 2 05/15/2019    LYMPHOPCT 7 05/15/2019    MONOPCT 8 05/15/2019    MYELOPCT 1 05/07/2019    BASOPCT 0 05/15/2019    MONOSABS 0.78 05/15/2019    LYMPHSABS 0.69 05/15/2019    EOSABS 0.00 05/15/2019    BASOSABS 0.00 05/15/2019    DIFFTYPE NOT REPORTED 05/15/2019       BMP   Lab Results   Component Value Date     05/16/2019    K 3.6 05/16/2019     05/16/2019    CO2 20 05/16/2019    BUN 8 05/16/2019    CREATININE <0.40 05/16/2019    GLUCOSE 94 05/16/2019    GLUCOSE 87 05/21/2012    CALCIUM 6.7 05/16/2019       LFTS  Lab Results   Component Value Date    ALKPHOS 196 05/11/2019    ALT 18 05/11/2019    AST 19 05/11/2019    PROT 5.9 05/11/2019    BILITOT 0.38 05/11/2019    BILIDIR 0.21 05/11/2019    IBILI 0.17 05/11/2019    LABALBU 1.9 05/11/2019    LABALBU 4.6 05/17/2012       INR  Recent Labs     05/15/19  0801 05/16/19  0412   PROTIME 15.6* 16.3*   INR 1.2 1.3       APTT  Recent Labs     05/15/19  0801

## 2019-05-16 NOTE — PROGRESS NOTES
Dr. Jem Wright notified of critical result of ET tube 8 cm above the ron. Order to advance ETT 4cm.  RT notified

## 2019-05-16 NOTE — PROGRESS NOTES
trough level will be determined based on culture results, renal function, and clinical response. Thank you for the consult. Will continue to follow.    Nicholas Perkins D, [address]  5/16/2019 8:02 AM

## 2019-05-16 NOTE — PLAN OF CARE
Nutrition Problem: Inadequate oral intake  Intervention: Food and/or Nutrient Delivery: Continue NPO  Nutritional Goals: Adequate nutritional intake or provision

## 2019-05-16 NOTE — FLOWSHEET NOTE
05/16/19 1030   Encounter Summary   Services provided to: Patient not available  (patient receiving medical care)

## 2019-05-16 NOTE — PLAN OF CARE
Problem: Restraint Use - Nonviolent/Non-Self-Destructive Behavior:  Goal: Absence of restraint-related injury  5/16/2019 0614 by Dewitt Aase, RN  Outcome: Ongoing  Note:   No restraint related injury noted.    5/16/2019 7020 by Dewitt Aase, RN  Outcome: Ongoing  5/16/2019 2353 by Dewitt Aase, RN  Reactivated     Problem: Restraint Use - Nonviolent/Non-Self-Destructive Behavior:  Goal: Absence of restraint indications  5/16/2019 0614 by Dewitt Aase, RN  Outcome: Ongoing  Note:   Bilat wrist restraints remain tied d/t pt attempt to grab ETT when unrestrained  5/16/2019 0613 by Dewitt Aase, RN  Outcome: Ongoing  5/16/2019 0613 by Dewitt Aase, RN  Reactivated

## 2019-05-16 NOTE — PROGRESS NOTES
[facility]    PROGRESS NOTE             5/16/2019    7:37 AM    Name:   Suad Valentino  MRN:     [id number]     Acct:      [de-identified]   Room:   2002/2002-01  IP Day:  28  Admit Date:  4/11/2019  2:48 PM    PCP:  Mario Laguerre DO  Code Status:  Full Code    Subjective:     C/C:   Chief Complaint   Patient presents with    Abdominal Pain     Interval History Status: not changed. Patient seen and examined at bedside. Discussed with RN. Patient is POD#1, was transferred to ICU from [address] [place] [address]. Intubated and sedated - sedation kept lower due to lower BP. Not requiring high vent support. Brief History:     Breanne Amaral is a 77 y.o. female who was admitted for the management of  sepsis 2/2 UTI and pneumonia. Pt developed cholecystitis, GI bleed, SBO, ileus, was previously refusing surgical intervention, did go to OR on 5/15. Review of Systems:     Review of Systems   Unable to perform ROS: Intubated     Medications: Allergies:     Allergies   Allergen Reactions    Penicillins Shortness Of Breath and Rash    Amoxicillin        Current Meds:   Scheduled Meds:    sodium chloride flush  10 mL Intravenous 2 times per day    enoxaparin  30 mg Subcutaneous Daily    pantoprazole  40 mg Intravenous Daily    And    sodium chloride (PF)  10 mL Intravenous Daily    ipratropium-albuterol  1 ampule Inhalation Q4H WA    meperidine  25 mg Intravenous Once    metoprolol  5 mg Intravenous Q12H    alteplase  1 mg Intracatheter Once    meropenem  1 g Intravenous Q8H    mometasone-formoterol  2 puff Inhalation BID     Continuous Infusions:    lactated ringers 150 mL/hr at 05/15/19 1920    propofol 10 mcg/kg/min (05/15/19 1920)    norepinephrine 8 mcg/min (05/16/19 9214)     PRN Meds: sodium chloride flush, ondansetron, HYDROmorphone **OR** HYDROmorphone, potassium chloride, magnesium sulfate, metoprolol    Data:     Past Medical History: has a past medical history of Abnormal computed tomography of cervical spine, CVA (cerebral vascular accident) ([address] GERD (gastroesophageal reflux disease), Hypertension, Paraproteinemia, and Weight loss. Social History:   reports that she has been smoking. She has a 30.00 pack-year smoking history. She has never used smokeless tobacco. She reports that she drank about 16.8 oz of alcohol per week. She reports that she does not use drugs. Family History: History reviewed. No pertinent family history. Vitals:  BP (!) 98/51   Pulse 97   Temp 97.8 °F (36.6 °C) (Axillary)   Resp 16   Ht 5' (1.524 m)   Wt 105 lb 2.6 oz (47.7 kg)   SpO2 100%   BMI 20.54 kg/m²   Temp (24hrs), Av.5 °F (35.8 °C), Min:82.6 °F (28.1 °C), Max:98.2 °F (36.8 °C)    Recent Labs     19  0637 19  1421 19  1806 05/15/19  0618   POCGLU 93 99 82 91       I/O(24Hr): Intake/Output Summary (Last 24 hours) at 2019 0737  Last data filed at 2019 0539  Gross per 24 hour   Intake 5665.99 ml   Output 2375 ml   Net 3290.99 ml       Labs:    [unfilled]    Lab Results   Component Value Date/Time    SPECIAL right hand 1cc 05/10/2019 10:18 AM     Lab Results   Component Value Date/Time    CULTURE NO GROWTH 5 DAYS 05/10/2019 10:18 AM       [facility]    Radiology:    Ct Abdomen Pelvis Wo Contrast Additional Contrast? Oral    Result Date: 2019  EXAMINATION: CT OF THE ABDOMEN AND PELVIS WITHOUT CONTRAST 2019 7:34 pm TECHNIQUE: CT of the abdomen and pelvis was performed without the administration of intravenous contrast. Multiplanar reformatted images are provided for review. Dose modulation, iterative reconstruction, and/or weight based adjustment of the mA/kV was utilized to reduce the radiation dose to as low as reasonably achievable.  COMPARISON: 2019 HISTORY: ORDERING SYSTEM PROVIDED HISTORY: ABDOMINAL PAIN TECHNOLOGIST PROVIDED HISTORY: Water soluble contrast only please Ordering Physician Provided Reason for Exam: Abdominal pain - Vented patient. Contrast given via nurse through NG tube. Acuity: Unknown Type of Exam: Unknown Relevant Medical/Surgical History: Hx - Sepsis due to urinary tract infection. FINDINGS: Lower Chest: New moderate layering bilateral pleural effusions with bilateral lower lobe atelectasis. Organs: Limited evaluation due lack of intravenous contrast.  Cholelithiasis redemonstrated. No gallbladder wall thickening or biliary ductal dilatation. Scattered tiny hypodense lesions in the liver are too small to characterize but statistically represent benign cysts or hemangiomas and appear unchanged. The pancreas, spleen, adrenal glands, and kidneys are unremarkable. There is no hydronephrosis or urinary tract calculus. GI/Bowel: The stomach is distended. Enteric tube is in place. No contrast is seen distal to the pylorus and there is contrast reflux into the distal esophagus. There is no evidence of bowel obstruction. The appendix is not definitely visualized. No focal pericecal inflammatory changes are evident. Pelvis: The urinary bladder is decompressed by Tran catheter. No pelvic mass is seen. Peritoneum/Retroperitoneum: Small amount of free fluid in the pelvic cavity. No free air or focal fluid collection. No abnormal lymph node. Normal abdominal aortic caliber. Moderate calcific atherosclerosis. Bones/Soft Tissues: No acute osseous abnormality. Diffuse anasarca. Moderate degenerative changes in the lumbar spine. 1. Distended, contrast filled stomach with reflux of contrast into the esophagus. No enteric contrast is seen distal to the pylorus suggesting delayed gastric emptying in the setting of ileus. 2. New moderate layering bilateral pleural effusions with bilateral lower lobe atelectasis. 3. Small amount of nonspecific free fluid in the pelvic cavity. 4. Anasarca. 5. Cholelithiasis.      Xr Chest (single View Frontal)    Result Date: 5/11/2019  EXAMINATION: ONE XRAY VIEW OF THE CHEST 5/11/2019 6:28 am COMPARISON: 10 May 2019 HISTORY: ORDERING SYSTEM PROVIDED HISTORY: Respiratory failure TECHNOLOGIST PROVIDED HISTORY: Respiratory failure Ordering Physician Provided Reason for Exam: Respiratory failure Acuity: Unknown Type of Exam: Unknown FINDINGS: AP portable view of the chest time stamped at 603 hours demonstrates findings of generalized COPD. Overlying cardiac monitoring electrodes are present. An intestinal tube extends below the fundus of the stomach, tip not included. Right central line terminates in the distal superior vena cava. Bipolar pacemaker enters from the left with intact leads in appropriate positions. Heart size is stable, top-normal.  Lung fields are hyperinflated suggestive of COPD. Interstitial markings are coarsened, similar to that noted in 2015 likely chronic interstitial change. There is improved aeration at the left lung base with slight blunting of the left costophrenic angle suggesting effusion. There is been interval clearing of right basilar opacities likely resolved atelectasis. Osseous and mediastinal structures are age-appropriate. No vascular congestion or extrapleural air is noted. Improved aeration of both lung bases with resolved right basilar opacity. Mild left basilar opacity persists with blunting of the left costophrenic angle with small effusion on the left suspected. Findings of COPD and suspected chronic fibrotic change are noted. No extrapleural air. Tubes and lines as above. Xr Chest (single View Frontal)    Result Date: 5/2/2019  EXAMINATION: SINGLE XRAY VIEW OF THE CHEST 5/2/2019 6:34 am COMPARISON: 29 April 2019 HISTORY: ORDERING SYSTEM PROVIDED HISTORY: COPD TECHNOLOGIST PROVIDED HISTORY: COPD Ordering Physician Provided Reason for Exam: COPD Acuity: Acute Type of Exam: Initial FINDINGS: AP portable view of the chest time stamped at 630 hours demonstrates stable cardiac size.   Overlying cardiac monitoring electrodes are present. Right-sided PICC line terminates in the right atrium. Bipolar pacemaker enters from the left with intact leads in appropriate positions. There is been no significant interval change in bilateral interstitial opacities, representing interval change since 2015. This may be related to worsening interstitial disease or superimposed venous congestion. Bilateral effusions are noted with bibasilar opacities, either atelectasis or edema. Continued bilateral effusions, bibasilar opacities and bilateral interstitial opacities in the upper lobes. Findings may be related to worsening interstitial disease and edema. Airspace disease is not excluded. Xr Chest (single View Frontal)    Result Date: 4/13/2019  EXAMINATION: SINGLE XRAY VIEW OF THE CHEST 4/13/2019 7:18 am COMPARISON: April 12, 2019 HISTORY: ORDERING SYSTEM PROVIDED HISTORY: dyspnea TECHNOLOGIST PROVIDED HISTORY: dyspnea Ordering Physician Provided Reason for Exam: dyspnea Acuity: Acute Type of Exam: Subsequent/Follow-up Additional signs and symptoms: dyspnea FINDINGS: A right IJ catheter is seen with its tip terminating at the superior cavoatrial junction. The left chest wall pacemaker and leads are stable. The cardiomediastinal silhouette is stable. There is interval increased opacity at the right lung base, may be related to atelectasis versus pneumonia. There is small atelectasis and mild pleural effusion at the left lung base. There is no pneumothorax. There is no acute osseous abnormality. Interval increased opacity at the right lung base, may be related to atelectasis versus pneumonia. Small atelectasis and mild pleural effusion at the left lung, new since the prior study.      Xr Chest Standard (2 Vw)    Result Date: 4/29/2019  EXAMINATION: TWO VIEWS OF THE CHEST 4/29/2019 8:04 pm COMPARISON: 04/27/2019 HISTORY: ORDERING SYSTEM PROVIDED HISTORY: Follow-up lung infiltrate TECHNOLOGIST PROVIDED HISTORY: Follow-up lung femoral metadiaphysis is in unchanged alignment and demonstrates interval healing. Xr Abdomen (kub) (single Ap View)    Result Date: 5/10/2019  EXAMINATION: ONE SUPINE XRAY VIEW(S) OF THE ABDOMEN 5/10/2019 9:14 am COMPARISON: Small-bowel series 05/09/2019 HISTORY: ORDERING SYSTEM PROVIDED HISTORY: Small bowel obstruction ([address] TECHNOLOGIST PROVIDED HISTORY: TO ACCOMPANY SMALL BOWEL EXAM SMALL BOWEL OBSTRUCTION Ordering Physician Provided Reason for Exam: SMALL BOWEL FOLLOW THRU - 20 HOUR DELAYED FILM FINDINGS: Significant interval decreased amount of retained contrast in the stomach compared to last image from earlier small bowel series which likely relates to suctioning of the contrast.  Majority of previously seen contrast within the pelvis likely in the rectum is no longer visualized and has likely passed through. Residual contrast remains within the colon and small bowel. NG tube is in place with side hole in the region of the GE junction. Partially visualized mild left pleural effusion associated atelectasis. 1. Interval presumed suctioning of contrast from the stomach which now appears relatively empty. Continued progression of contrast through the small and large bowel as described. 2. NG tube side hole at the level of the GE junction, consider advancement of 2-3 cm. Xr Abdomen (kub) (single Ap View)    Result Date: 5/8/2019  EXAMINATION: SINGLE SUPINE XRAY VIEW(S) OF THE ABDOMEN 5/8/2019 8:59 am COMPARISON: CT abdomen from 05/05/2019, and KUB from 04/19/2019 HISTORY: ORDERING SYSTEM PROVIDED HISTORY: recheck obstruction TECHNOLOGIST PROVIDED HISTORY: recheck obstruction Ordering Physician Provided Reason for Exam: recheck obstruction Acuity: Unknown Type of Exam: Unknown FINDINGS: Again noted cholecystostomy tube, electrode leads from a pacemaker overlying the heart, and overlying electrode leads/tubing. NG tube no longer demonstrated.  Gas-filled bowel loops without marked dilatation; cecum measures 8 cm, slightly increased as compared to the previous study. No obvious free air. No mass or organomegaly. Bones unchanged. Retrocardiac opacity, hyperinflated lungs and probable pleural effusions. NG tube removed. Gas-filled bowel more suggestive ileus; no clear cut obstruction. Cecum measures 8 cm. Cholecystostomy tube in unchanged position. Additional unchanged findings, as above. RECOMMENDATION: Continued clinical correlation and follow-up     Xr Abdomen (kub) (single Ap View)    Result Date: 4/19/2019  EXAMINATION: SINGLE SUPINE XRAY VIEW(S) OF THE ABDOMEN 4/19/2019 9:48 am COMPARISON: April 14, 2019 HISTORY: ORDERING SYSTEM PROVIDED HISTORY: pain TECHNOLOGIST PROVIDED HISTORY: pain Ordering Physician Provided Reason for Exam: Abdominal pain and distentsion Acuity: Acute Type of Exam: Initial Additional signs and symptoms: Abdominal pain and distentsion FINDINGS: Enteric tube with the tip within the stomach. Small left pleural effusion. Minimal right pleural effusion. Mildly dilated loops of bowel. Nonspecific bowel gas pattern with mildly dilated loops of bowel. Xr Abdomen (kub) (single Ap View)    Result Date: 4/14/2019  EXAMINATION: SINGLE SUPINE XRAY VIEW(S) OF THE ABDOMEN 4/14/2019 7:39 am COMPARISON: CT abdomen and pelvis  film from 11 April 2019 HISTORY: [address]: Abd Distention TECHNOLOGIST PROVIDED HISTORY: Abd Distention Ordering Physician Provided Reason for Exam: Abdominal distention. Pt was moving around in the bed. Best films at present time. Acuity: Acute Type of Exam: Initial Additional signs and symptoms: Abdominal distention. Pt was moving around in the bed. Best films at present time. FINDINGS: Portable view time stamped at 748 hours demonstrates an intestinal tube terminating in the midportion of a gaseous Spike dilated stomach. Densities are present over the stomach likely medication. Bipolar pacemaker is in situ with intact leads. Heart size is top-normal, stable. Gaseous distension of the stomach and loop of bowel in the upper mid abdomen is noted but there is gas and fecal material in the rectum. Gastric outlet obstruction or proximal small bowel partial obstruction is suspected. No free air is noted. Midline city is present over the pelvis likely a monitor or CT small bore catheter. Vascular calcification is present in the pelvis. Persistent preferential gaseous distension of the stomach although there is some gas in the upper abdomen and gas and fecal material present in the rectosigmoid. Findings suggest gastric outlet obstruction. Ct Abdomen W Contrast Additional Contrast? Radiologist Recommendation    Result Date: 5/5/2019  EXAMINATION: CT ABDOMEN WITH CONTRAST, 5/5/2019 3:38 pm TECHNIQUE: CT of the abdomen was performed with the administration of intravenous contrast. Multiplanar reformatted images are provided for review. Dose modulation, iterative reconstruction, and/or weight based adjustment of the mA/kV was utilized to reduce the radiation dose to as low as reasonably achievable. COMPARISON: April 14, 2019 HISTORY: ORDERING SYSTEM PROVIDED HISTORY: Check for abscess/fluid collection around ashley tube site. TECHNOLOGIST PROVIDED HISTORY: Ordering Physician Provided Reason for Exam: Patient c/o abd pain around her ashley site and drainage tube. FINDINGS: Evaluation is limited by motion. Lower Chest: Moderate bilateral pleural effusions. Organs: Multiple liver hypodensities measuring up to 4 mm are incompletely characterized but in the absence of risk factors likely represent cysts requiring no specific follow-up. Cholecystostomy tube is in place. No adjacent focal drainable fluid collection. No pancreatic lesion. No splenomegaly. No adrenal lesion. No hydronephrosis. No renal lesion. GI/Bowel: Stomach is markedly distended.   Swirled appearance of the mesentery is seen within the mid to lower abdomen with adjacent thickened loops of small bowel. Peritoneum/Retroperitoneum: Atherosclerotic calcification of the abdominal aorta without aneurysmal dilatation. No adenopathy. Bones/Soft Tissues: No acute soft tissue abnormality. Diffuse osteopenia. Lumbar spine degenerative changes. Bowel obstruction which is suspected to be caused by an internal hernia. Cholecystostomy tube is in place. No surrounding fluid collection. Nm Gi Blood Loss    Result Date: 5/3/2019  EXAMINATION: NUCLEAR MEDICINE GASTRIC BLEEDING STUDY 5/3/2019 TECHNIQUE: Following the intravenous injection of 23.8 mCi of 99 mTc-labeled RBC's, a flow study and standard images of the abdomen was obtained over a total period of 60 minutes COMPARISON: None. HISTORY: ORDERING SYSTEM PROVIDED HISTORY: GI BLEEDING TECHNOLOGIST PROVIDED HISTORY: Ordering Physician Provided Reason for Exam: GI bleeding Acuity: Unknown Type of Exam: Unknown FINDINGS: Activity is seen within the blood pool, including the great vessels, heart, liver, and spleen. There was no abnormal accumulation of radioactivity in the abdomen to suggest active bleeding during study acquisition. No evidence of active GI bleeding during acquisition. RECOMMENDATIONS: If the patient shows hemodynamic signs of an active bleed in the next 20 hours, additional images can be acquired. aKte Caldera Laming Device Plmt/replace/removal    Result Date: 4/30/2019  PROCEDURE: ULTRASOUND GUIDED VASCULAR ACCESS. FLUOROSCOPY GUIDED PICC PLACEMENT 4/30/2019. HISTORY: ORDERING SYSTEM PROVIDED HISTORY: TPN TECHNOLOGIST PROVIDED HISTORY: TPN Lumen?->Double Lumen SEDATION: None FLUOROSCOPY DOSE AND TYPE OR TIME AND EXPOSURES: 7 seconds; D  TECHNIQUE: This procedure was performed by Dr. Nichol Villafuerte. Informed consent was obtained after a detailed explanation of the procedure including risks, benefits, and alternatives. Universal protocol was observed.  The right arm was prepped and draped in sterile fashion using maximum sterile barrier technique. Local anesthesia was achieved with lidocaine. A micropuncture needle was used to access the right basilic vein using ultrasound guidance. An ultrasound image demonstrating patency of the vein with needle tip located within it. An image was obtained and stored in PACs. A 0.018 guidewire was used to place a peel-a-way sheath and a 5 Czech dual-lumen PICC was advanced with fluoroscopic guidance with the tip at the cavo-atrial junction. The catheter flushed easily and there was a good blood return. The catheter was secured to the skin. The patient tolerated the procedure well and there were no immediate complications. FINDINGS: Fluoroscopic image demonstrates the tip of the catheter at the cavo-atrial junction. Successful ultrasound and fluoroscopy guided PICC placement     Us Gallbladder Ruq    Result Date: 4/19/2019  EXAMINATION: RIGHT UPPER QUADRANT ULTRASOUND 4/19/2019 10:48 am COMPARISON: CT abdomen and pelvis 4/14/2018 HISTORY: ORDERING SYSTEM PROVIDED HISTORY: ABDOMINAL PAIN FINDINGS: Pancreas is not well visualized. No liver lesion. Gallbladder wall thickening. Gallbladder sludge. Cholelithiasis. Pericholecystic fluid. Common bile duct measures at the upper limits of normal at 7 mm. Findings suggesting acute cholecystitis. Xr Chest Portable    Result Date: 5/10/2019  EXAMINATION: ONE XRAY VIEW OF THE CHEST 5/10/2019 1:28 am COMPARISON: May 2, 2019. HISTORY: ORDERING SYSTEM PROVIDED HISTORY: low o2 sat TECHNOLOGIST PROVIDED HISTORY: low o2 sat Ordering Physician Provided Reason for Exam: Low o2 sat Acuity: Acute Type of Exam: Initial FINDINGS: Hyperexpanded right lung volume. Left greater than right basilar heterogeneous opacities with left basilar consolidation. Small bilateral pleural effusions. Stable cardiomediastinal silhouette and great vessels. Enteric contrast in the stomach and distal esophagus.   Enteric tube courses into the stomach but tip is not effusion. Increased reticular markings right upper chest and left upper chest possibly interstitial edema or interstitial pneumonia. No new airspace consolidation. Increasing reticular markings upper chest bilaterally right greater than left possibly due to edema or interstitial pneumonitis. Improving left effusion with associated retrocardiac airspace disease. Pleural-parenchymal scarring or effusion in the right lung base, stable. Xr Chest Portable    Result Date: 4/24/2019  EXAMINATION: SINGLE XRAY VIEW OF THE CHEST 4/24/2019 6:05 am COMPARISON: Chest radiograph dated 04/22/2019 HISTORY: ORDERING SYSTEM PROVIDED HISTORY: Acute respiratory failure, pleural effusion TECHNOLOGIST PROVIDED HISTORY: Acute respiratory failure, pleural effusion Ordering Physician Provided Reason for Exam: pleural effusion, respiratory failure. Acuity: Acute Type of Exam: Initial FINDINGS: Heart size is normal.  Dual-chamber pacemaker placed via left subclavian approach. Right internal jugular central line has tip in distal superior vena cava. Interval removal of nasogastric tube. No interval change of infiltrate and atelectasis at the left lung base. Stable mild infiltrate in the left upper lobe. Mild interval improvement of infiltrate at the right lung base. Stable small bilateral pleural effusions, left larger than right. Mild CHF, stable. 1.  No interval change of infiltrate and atelectasis at the left lung base. Stable mild infiltrate in the left upper lobe. 2.  Mild interval improvement of infiltrate at the right lung base. 3.  Stable small bilateral pleural effusions, left larger than right. 4.  Mild CHF, stable. Xr Chest Portable    Result Date: 4/22/2019  EXAMINATION: SINGLE XRAY VIEW OF THE CHEST 4/22/2019 6:44 am COMPARISON: April 21, 2019.  HISTORY: ORDERING SYSTEM PROVIDED HISTORY: Hypoxia and CHF TECHNOLOGIST PROVIDED HISTORY: Hypoxia and CHF Ordering Physician Provided Reason for Exam: hx of hypoxia/CHF Acuity: Acute Type of Exam: Initial FINDINGS: Right IJ central venous catheter appears in unchanged position. Nasogastric tube courses below the diaphragm, with tip not imaged. Left chest wall pacemaker device projects in unchanged position. Cardiac and mediastinal contours are enlarged but unchanged. Unchanged bilateral pleural effusions and bibasilar pulmonary opacities. Unchanged findings suggestive of congestive heart failure. No evidence of pneumothorax. No new osseous abnormalities. 1. No significant change in findings suggestive of congestive heart failure. 2. Unchanged bilateral pleural effusions and bibasilar pulmonary consolidations. RECOMMENDATION: Radiographic follow-up to complete resolution. Xr Chest Portable    Result Date: 4/21/2019  EXAMINATION: SINGLE XRAY VIEW OF THE CHEST 4/21/2019 3:08 pm COMPARISON: April 19, 2019 HISTORY: ORDERING SYSTEM PROVIDED HISTORY: hypoxia TECHNOLOGIST PROVIDED HISTORY: hypoxia Ordering Physician Provided Reason for Exam: hypoxia Acuity: Unknown Type of Exam: Unknown FINDINGS: Enteric tube in the stomach. ET tube is been removed. Right IJ catheter terminates in the atrial caval junction. Bipolar pacer on the left unchanged. Heart and mediastinum unremarkable. Moderate edema unchanged. Small effusions, left greater than right. Bony thorax intact. Status post extubation. Life support apparatus otherwise stable. Moderate edema and effusions unchanged. Xr Chest Portable    Result Date: 4/19/2019  EXAMINATION: SINGLE XRAY VIEW OF THE CHEST 4/19/2019 5:51 am COMPARISON: April 18, 2019 HISTORY: ORDERING SYSTEM PROVIDED HISTORY: ETT placement TECHNOLOGIST PROVIDED HISTORY: ETT placement Ordering Physician Provided Reason for Exam: Vent Pt check ETT placement Acuity: Acute Type of Exam: Subsequent/Follow-up Additional signs and symptoms: Vent Pt check ETT placement FINDINGS: Tubes and lines are unchanged.  Persistent bibasilar lung opacities and pleural effusions. Mild pulmonary edema. No significant interval change. Xr Chest Portable    Result Date: 4/18/2019  EXAMINATION: SINGLE XRAY VIEW OF THE CHEST 4/18/2019 6:21 am COMPARISON: April 17, 2019 HISTORY: ORDERING SYSTEM PROVIDED HISTORY: ETT placement TECHNOLOGIST PROVIDED HISTORY: ETT placement Ordering Physician Provided Reason for Exam: on vent Acuity: Acute Type of Exam: Initial FINDINGS: Right IJ line and NG tube are stable. Interval advancement of ET tube terminating 3.1 cm from ron. Implanted cardiac device is present. Left-sided effusion and associated airspace disease is stable. Hazy right basilar opacity possibly edema or atelectasis. No pneumothorax. Increased opacity left upper chest possibly focal area of atelectasis, new from prior. Advanced ETT from prior exam, now 3.1 cm from ron. Increased opacity left upper chest suspicious for atelectasis. Additional supporting devices are stable. Xr Chest Portable    Result Date: 4/17/2019  EXAMINATION: SINGLE XRAY VIEW OF THE CHEST 4/17/2019 7:15 am COMPARISON: 16 April 2019 HISTORY: ORDERING SYSTEM PROVIDED HISTORY: ETT placement TECHNOLOGIST PROVIDED HISTORY: ETT placement Ordering Physician Provided Reason for Exam: on vent Acuity: Acute Type of Exam: Initial FINDINGS: AP portable view of the chest time stamped at 613 hours demonstrates overlying cardiac monitoring electrodes. A right internal jugular catheter terminates in the superior vena cava. Endotracheal tube is somewhat high riding terminating 6.4 cm above the ron. Intestinal tube extends beyond the body of the stomach, tip not included on the exam.  Bipolar pacemaker enters from the left with intact leads in appropriate positions.  Heart size is normal.  Left pleural effusion is noted there is continued volume loss in the left hemithorax with stable mild vascular congestion, perihilar opacities, and faint opacity in the right mid and lower lung field. Underlying COPD is noted. Osseous structures are stable. There is little change from prior study. Findings of COPD, mild central vascular congestion, left effusion, and scattered opacities favoring interstitial edema with multifocal pneumonitis not excluded. Tubes and lines as above. However, endotracheal tube is high riding. The findings were sent to the Radiology Results [address] at 7:58 am on 4/17/2019to be communicated to a licensed caregiver. RECOMMENDATION: Suggest advancement of endotracheal tube. Xr Chest Portable    Result Date: 4/16/2019  EXAMINATION: SINGLE XRAY VIEW OF THE CHEST 4/16/2019 6:54 am COMPARISON: 04/15/2019, 610 hours HISTORY: ORDERING SYSTEM PROVIDED HISTORY: ETT placement TECHNOLOGIST PROVIDED HISTORY: ETT placement Ordering Physician Provided Reason for Exam: on vent Acuity: Acute Type of Exam: Initial 64-year-old female on ventilator; check endotracheal tube placement FINDINGS: Portable AP upright view of the chest. Endotracheal tube distal tip overlying the mid trachea approximately 4.1 cm above the level of the ron. Enteric tube traverses the GE junction with distal tip excluded from the field of view. Left subclavian approach cardiac pacemaker device distal lead tips relatively stable in position. Right internal jugular approach central venous catheter distal tip overlying the high right atrium, stable. Cardiac monitor leads overlie the chest. Atherosclerotic calcification of the thoracic aorta. Slight stable volume loss of the left hemithorax. No pneumothorax. No free air. Dense retrocardiac/left basilar airspace consolidation and small left-sided pleural effusion. Stable mild focal opacity at the right mid lung zone. Underlying COPD. Stable mild pulmonary vascular congestion and left-sided predominant parahilar opacity. Visualized osseous structures remain unchanged.      1. Stable multifocal airspace disease as detailed above with dense retrocardiac/left basilar airspace consolidation and small left-sided pleural effusion. Mild pulmonary vascular congestion. Findings may represent edema or multifocal pneumonia. 2. Underlying COPD. 3. Tubes and line as detailed above. Xr Chest Portable    Result Date: 4/15/2019  EXAMINATION: SINGLE XRAY VIEW OF THE CHEST 4/15/2019 6:47 am COMPARISON: 14 April 2019 HISTORY: ORDERING SYSTEM PROVIDED HISTORY: ETT placement TECHNOLOGIST PROVIDED HISTORY: ETT placement Ordering Physician Provided Reason for Exam: on vent Acuity: Acute Type of Exam: Initial FINDINGS: AP portable view of the chest time stamped at 612 hours demonstrates overlying cardiac monitoring electrodes. Endotracheal tube terminates 4 cm above the ron. Bipolar pacemaker enters from the left with intact leads in appropriate positions. Intestinal tube extends beyond the fundus of the stomach, tip not included. Right internal jugular catheter terminates at the cavoatrial junction. Heart size is normal.  Aortic arch is calcified. There is interval improvement in vascular congestion with resolution of perihilar opacities. Some bibasilar opacities remain. No extrapleural air is noted. Osseous structures are stable. Interval improvement in vascular congestion and bilateral opacities consistent with resolving pulmonary edema. Tubes and lines as above. Xr Chest Portable    Result Date: 4/14/2019  EXAMINATION: SINGLE XRAY VIEW OF THE CHEST 4/14/2019 7:57 am COMPARISON: Portable chest 04/13/2019. HISTORY: ORDERING SYSTEM PROVIDED HISTORY: Intubation TECHNOLOGIST PROVIDED HISTORY: Intubation Ordering Physician Provided Reason for Exam: intubation Acuity: Acute Type of Exam: Initial Additional signs and symptoms: intubation FINDINGS: Endotracheal tube terminates over the midthoracic trachea. Dual-chamber pacemaker leads appear unchanged in position. Right IJ approach central venous catheter unchanged in position.  Heart size not substantially changed. Perihilar and basilar opacities further increased. Left pleural effusion increased in size. Findings may reflect pulmonary edema, progressed from yesterday's exam.  Left pleural effusion increased in size. Vl Upper Extremity Bilateral Venous Duplex    Result Date: 4/22/2019    [facility]  Vascular Upper Extremities Veins Procedure   Patient Name   Peng Haynes     Date of Study           04/22/2019                 Jules Valentino   Date of Birth  1950  Gender                  Female   Age            77 year(s)  Race                       Room Number    2002        Height:                 59.84 inch, 152 cm   Corporate ID # [id number]    Weight:                 102 pounds, 46.3 kg   Patient Acct # [de-identified]   BSA:        1.4 m^2     BMI:       20.03 kg/m^2   MR #           [id number]      Sonographer             Roderick Mario   Accession #    [id number]   Interpreting Physician  Lindy Strickland   Referring                  Referring Physician     Phyllis Joseph *  Nurse  Practitioner  Procedure Type of Study:   Veins: Upper Extremities Veins, Venous Scan Upper Bilateral.  Indications for Study:Swelling. Patient Status: In Patient. Technical Quality:Limited visualization. Limitation reason:Line placements. Conclusions   Summary   No evidence of superficial or deep venous thrombosis in both upper  extremities.    Signature   ----------------------------------------------------------------  Electronically signed by Roderick Mario(Sonographer) on  04/22/2019 11:46 AM  ----------------------------------------------------------------   ----------------------------------------------------------------  Electronically signed by Tiajuana Baumgarten Farooq(Interpreting  physician) on 04/22/2019 09:31 PM  ----------------------------------------------------------------  Findings:   Right Impression:                  Left Impression:  Internal jugular vein not          The internal jugular, subclavian,  visualized due to line placement. axillary, brachial, radial, and ulnar                                     veins demonstrate normal  Subclavian, axillary, brachial,    compressibility with phasic Doppler  radial, and ulnar veins            signals. demonstrate normal compressibility  with phasic Doppler signals. Normal compressibility of the cephalic                                     vein. Normal compressibility of the  cephalic vein. Normal compressibility of the basilic                                     vein. Normal compressibility of the  basilic vein. Velocities are measured in cm/s ; Diameters are measured in cm Right UE Vein Measurements 2D Measurements +------------------------------------+----------+---------------+----------+ ! Location                            ! Visualized! Compressibility! Thrombosis! +------------------------------------+----------+---------------+----------+ ! Prox SCV                            ! Yes       ! Yes            ! None      ! +------------------------------------+----------+---------------+----------+ ! Dist SCV                            ! Yes       ! Yes            ! None      ! +------------------------------------+----------+---------------+----------+ ! Prox Axillary                       ! Yes       ! Yes            ! None      ! +------------------------------------+----------+---------------+----------+ ! Dist Axillary                       ! Yes       ! Yes            ! None      ! +------------------------------------+----------+---------------+----------+ ! Prox Brachial                       !Yes       ! Yes            ! None      ! +------------------------------------+----------+---------------+----------+ ! Dist Brachial                       !Yes       ! Yes            ! None      ! +------------------------------------+----------+---------------+----------+ ! Prox Radial                         !Yes       ! Yes            ! None      ! +------------------------------------+----------+---------------+----------+ ! Dist Radial                         !Yes       ! Yes            ! None      ! +------------------------------------+----------+---------------+----------+ ! Prox Ulnar                          ! Yes       ! Yes            ! None      ! +------------------------------------+----------+---------------+----------+ ! Dist Ulnar                          ! Yes       ! Yes            ! None      ! +------------------------------------+----------+---------------+----------+ ! Basilic at UA                       ! Yes       ! Yes            ! None      ! +------------------------------------+----------+---------------+----------+ ! Basilic at AF                       ! Yes       ! Yes            ! None      ! +------------------------------------+----------+---------------+----------+ ! Basilic at [address]                       ! Yes       ! Yes            ! None      ! +------------------------------------+----------+---------------+----------+ ! Cephalic at UA                      ! Yes       ! Yes            ! None      ! +------------------------------------+----------+---------------+----------+ ! Cephalic at AF                      ! Yes       ! Yes            ! None      ! +------------------------------------+----------+---------------+----------+ ! Cephalic at [address]                      ! Yes       ! Yes            ! None      ! +------------------------------------+----------+---------------+----------+ Doppler Measurements +-------------------------+-----------------------+------------------------+ ! Location                 ! Signal                 !Reflux                  ! +-------------------------+-----------------------+------------------------+ ! SCV                      ! Phasic                 ! No                      ! +-------------------------+-----------------------+------------------------+ ! Axillary                 ! Phasic                 ! No                      ! +-------------------------+-----------------------+------------------------+ ! Brachial                 !Phasic                 ! No                      ! +-------------------------+-----------------------+------------------------+ Left UE Vein Measurements 2D Measurements +------------------------------------+----------+---------------+----------+ ! Location                            ! Visualized! Compressibility! Thrombosis! +------------------------------------+----------+---------------+----------+ ! Prox IJV                            ! Yes       ! Yes            ! None      ! +------------------------------------+----------+---------------+----------+ ! Dist IJV                            ! Yes       ! Yes            ! None      ! +------------------------------------+----------+---------------+----------+ ! Prox SCV                            ! Yes       ! Yes            ! None      ! +------------------------------------+----------+---------------+----------+ ! Dist SCV                            ! Yes       ! Yes            ! None      ! +------------------------------------+----------+---------------+----------+ ! Prox Axillary                       ! Yes       ! Yes            ! None      ! +------------------------------------+----------+---------------+----------+ ! Dist Axillary                       ! Yes       ! Yes            ! None      ! +------------------------------------+----------+---------------+----------+ ! Prox Brachial                       !Yes       ! Yes            ! None      ! +------------------------------------+----------+---------------+----------+ ! Dist Brachial                       !Yes       ! Yes            ! None      ! +------------------------------------+----------+---------------+----------+ ! Prox Radial                         !Yes       ! Yes            ! None      ! +------------------------------------+----------+---------------+----------+ ! Dist Radial                         !Yes       ! Yes !None      ! +------------------------------------+----------+---------------+----------+ ! Prox Ulnar                          ! Yes       ! Yes            ! None      ! +------------------------------------+----------+---------------+----------+ ! Dist Ulnar                          ! Yes       ! Yes            ! None      ! +------------------------------------+----------+---------------+----------+ ! Basilic at UA                       ! Yes       ! Yes            ! None      ! +------------------------------------+----------+---------------+----------+ ! Cephalic at UA                      ! Yes       ! Yes            ! None      ! +------------------------------------+----------+---------------+----------+ ! Cephalic at AF                      ! Yes       ! Yes            ! None      ! +------------------------------------+----------+---------------+----------+ Doppler Measurements +-------------------------+-----------------------+------------------------+ ! Location                 ! Signal                 !Reflux                  ! +-------------------------+-----------------------+------------------------+ ! IJV                      ! Phasic                 !                        ! +-------------------------+-----------------------+------------------------+ ! SCV                      ! Phasic                 !                        ! +-------------------------+-----------------------+------------------------+ ! Axillary                 ! Phasic                 !                        ! +-------------------------+-----------------------+------------------------+ ! Brachial                 !Phasic                 !                        ! +-------------------------+-----------------------+------------------------+    Vl Dup Lower Extremity Venous Bilateral    Result Date: 5/7/2019    [facility]  Vascular Lower Extremities DVT Study Procedure   Patient Name   [facility]     Date of Study           05/07/2019 saphenous vein. saphenous vein. Normal compressibility of the small  Normal compressibility of the small  saphenous vein. saphenous vein. Velocities are measured in cm/s ; Diameters are measured in cm Right Lower Extremities DVT Study Measurements Right 2D Measurements +------------------------------------+----------+---------------+----------+ ! Location                            ! Visualized! Compressibility! Thrombosis! +------------------------------------+----------+---------------+----------+ ! Common Femoral                      !Yes       ! Yes            ! None      ! +------------------------------------+----------+---------------+----------+ ! Prox Femoral                        !Yes       ! Yes            ! None      ! +------------------------------------+----------+---------------+----------+ ! Mid Femoral                         !Yes       ! Yes            ! None      ! +------------------------------------+----------+---------------+----------+ ! Dist Femoral                        !Yes       ! Yes            ! None      ! +------------------------------------+----------+---------------+----------+ ! Deep Femoral                        !Yes       ! Yes            ! None      ! +------------------------------------+----------+---------------+----------+ ! Popliteal                           !Yes       ! Yes            ! None      ! +------------------------------------+----------+---------------+----------+ ! Sapheno Femoral Junction            ! Yes       ! Yes            ! None      ! +------------------------------------+----------+---------------+----------+ ! PTV                                 ! Partial   !Yes            ! None      ! +------------------------------------+----------+---------------+----------+ ! Peroneal                            !Partial   !Yes            ! None      ! +------------------------------------+----------+---------------+----------+ ! Gastroc !Yes       !Yes            ! None      ! +------------------------------------+----------+---------------+----------+ ! GSV Thigh                           ! Yes       ! Yes            ! None      ! +------------------------------------+----------+---------------+----------+ ! GSV Knee                            ! Yes       ! Yes            ! None      ! +------------------------------------+----------+---------------+----------+ ! GSV Ankle                           ! Yes       ! Yes            ! None      ! +------------------------------------+----------+---------------+----------+ ! SSV                                 ! Partial   !Yes            ! None      ! +------------------------------------+----------+---------------+----------+ Left Lower Extremities DVT Study Measurements Left 2D Measurements +------------------------------------+----------+---------------+----------+ ! Location                            ! Visualized! Compressibility! Thrombosis! +------------------------------------+----------+---------------+----------+ ! Common Femoral                      !Yes       ! Yes            ! None      ! +------------------------------------+----------+---------------+----------+ ! Prox Femoral                        !Yes       ! Yes            ! None      ! +------------------------------------+----------+---------------+----------+ ! Mid Femoral                         !Yes       ! Yes            ! None      ! +------------------------------------+----------+---------------+----------+ ! Dist Femoral                        !Yes       ! Yes            ! None      ! +------------------------------------+----------+---------------+----------+ ! Deep Femoral                        !Yes       ! Yes            ! None      ! +------------------------------------+----------+---------------+----------+ ! Popliteal                           !Yes       ! Yes            ! None      ! +------------------------------------+----------+---------------+----------+ ! Sapheno Femoral Junction            ! Yes       ! Yes            ! None      ! +------------------------------------+----------+---------------+----------+ ! PTV                                 ! Partial   !Yes            ! None      ! +------------------------------------+----------+---------------+----------+ ! Peroneal                            !Partial   !Yes            ! None      ! +------------------------------------+----------+---------------+----------+ ! Gastroc                             ! Yes       ! Yes            ! None      ! +------------------------------------+----------+---------------+----------+ ! GSV Thigh                           ! Yes       ! Yes            ! None      ! +------------------------------------+----------+---------------+----------+ ! GSV Knee                            ! Yes       ! Yes            ! None      ! +------------------------------------+----------+---------------+----------+ ! GSV Ankle                           ! Yes       ! Yes            ! None      ! +------------------------------------+----------+---------------+----------+ ! SSV                                 ! Partial   !Yes            ! None      ! +------------------------------------+----------+---------------+----------+    Ir Cholecystostomy Percutaneous Complete    Result Date: 4/22/2019  PROCEDURE: ULTRASOUND and fluoroscopic GUIDED CHOLECYSTOSTOMY TUBE PLACEMENT April 22, 2019 HISTORY: ORDERING SYSTEM PROVIDED HISTORY: acute cholecystitis TECHNOLOGIST PROVIDED HISTORY: acute cholecystitis SEDATION: 75 mcg IV fentanyl for pain TECHNIQUE: Informed consent was obtained after a detailed explanation of the procedure including risks, benefits, and alternatives. Universal protocol was followed. A suitable skin site was prepped and draped in sterile fashion following ultrasound localization.  An 18 gauge needle was advanced under ultrasound guidance into the gallbladder via transhepatic route and a 0.035 guidewire was used to place a 8 [place] cholecystostomy tube under fluoroscopic guidance. The catheter was sutured to the skin and the patient tolerated the procedure well. Thick bilious material was sent for laboratory analysis. The catheter was attached to gravity drainage. FINDINGS: Ultrasound image demonstrates distended gallbladder. Bilious fluid was sent for culture. Successful placement of transhepatic 8 [place] percutaneous cholecystostomy tube. Nm Hepatobiliary Scan W Ejection Fraction    Result Date: 4/20/2019  EXAMINATION: NUCLEAR MEDICINE HEPATOBILIARY SCINTIGRAPHY (HIDA SCAN). 4/20/2019 2:15 pm TECHNIQUE: Approximately 5.6 [id number] Mebrofenin (Choletec) was administered IV. Then, dynamic images of the abdomen were obtained in the anterior projection for 60 mins. A right lateral view was also obtained at 60 mins. Delayed images up to 4 hours were obtained. COMPARISON: Ultrasound 04/19/2019 HISTORY: ORDERING SYSTEM PROVIDED HISTORY: CHOLECYSTITIS TECHNOLOGIST PROVIDED HISTORY: Ordering Physician Provided Reason for Exam: Cholecystitis Acuity: Unknown Type of Exam: Unknown FINDINGS: Prompt, homogenous uptake by the liver is noted with normal appearance of radiotracer excretion into the biliary system. Clearance of bloodpool activity appears appropriate. Normal small bowel activity is seen. Prominent activity within the common bile duct. The gallbladder is not visualized by the end of the exam.     Absent filling of the gallbladder consistent with acute cholecystitis. Fl Small Bowel Follow Through Only    Result Date: 5/10/2019  EXAMINATION: SMALL BOWEL FOLLOW THROUGH SERIES 5/9/2019 TECHNIQUE: Small bowel follow through series was performed with overhead images. FLUOROSCOPY DOSE AND TYPE OR TIME AND EXPOSURES: No fluoroscopic images obtained.  COMPARISON: CT abdomen pelvis 05/05/2019 HISTORY: ORDERING SYSTEM PROVIDED HISTORY: internal hernia TECHNOLOGIST PROVIDED HISTORY: Water soluble contrast only. Study to be done ONLY if NGT is placed by IR internal hernia FINDINGS:  image demonstrates NG tube overlying the left side of the abdomen within the stomach. Surgical drain overlies the right side of the abdomen. There is a nonspecific bowel gas pattern with mild dilated loops of bowel in lower abdomen. Initial images demonstrate filling of the stomach. At 1 hour and 45 minutes no significant contrast is noted in the small bowel. The 4-1/2 hour image demonstrates contrast within loops of bowel within the mid and lower abdomen and pelvis. There appears to be contrast extending to the colon in the right side of the abdomen. Contrast is noted within the pelvis which may be within the bladder or rectum. Significant delay in emptying of the stomach with persistent contrast noted at the 6 hour concha. There is contrast extending into the bowel which appears to be in the colon. Subtle findings are limited. Consider follow-up radiograph of the abdomen in 6-8 hours. Ir Place Ng Tube By Dr Lili Gimenez    Result Date: 5/9/2019  PROCEDURE: XR PLACE NASOGASTRIC TUBE PHYS 5/9/2019 HISTORY: ORDERING SYSTEM PROVIDED HISTORY: ileus TECHNOLOGIST PROVIDED HISTORY: ileus Ordering Physician Provided Reason for Exam: ILEUS Acuity: Unknown Type of Exam: Unknown CONTRAST: None SEDATION: None FLUOROSCOPY DOSE AND TYPE OR TIME AND EXPOSURES: 21 seconds; D AP 46 DESCRIPTION OF PROCEDURE AND FINDINGS: This procedure was performed by Dr. Dixie Sosa. Under intermittent fluoroscopic guidance a 12 [place] nasogastric tube was placed through the right nostril and directed under fluoroscopy into the stomach. A small amount of air was injected confirming the tip location in the stomach. Partially visualized cholecystostomy tube and pacer wires. Tube was secured in place to the patient's nose with an adhesive dressing.      12 [place] nasogastric tube placed successfully with its tip in the stomach. Physical Examination:        Physical Exam   Constitutional: She is oriented to person, place, and time. No distress. Sedated, on pressor x 1, does not appear in acute distress   Eyes: Pupils are equal, round, and reactive to light. Conjunctivae are normal.   Neck: Neck supple. Cardiovascular: Regular rhythm, normal heart sounds and intact distal pulses. Pulmonary/Chest: Effort normal and breath sounds normal. No respiratory distress. She has no wheezes. She has no rales. She exhibits no tenderness. Abdominal: Soft. Bowel sounds are normal. There is no tenderness. Musculoskeletal: She exhibits edema (B/L hands, pitting, +2). She exhibits no tenderness. Neurological: She is alert and oriented to person, place, and time. Skin: Skin is warm and dry. Capillary refill takes less than 2 seconds. Vitals reviewed.     Vitals:    05/16/19 0615 05/16/19 0645 05/16/19 0704 05/16/19 0713   BP: (!) 113/46 (!) 98/51     Pulse: 101 97  97   Resp: 16 16 17 16   Temp:       TempSrc:       SpO2: 99%  99% 100%   Weight:       Height:           Assessment:        Primary Problem  GI bleed    Active Hospital Problems    Diagnosis Date Noted    Small bowel obstruction ([address] [K56.609]     Anxiety disorder [F41.9]     Noncompliance [Z91.19]     Anemia [D64.9]     GI bleed [K92.2] 05/03/2019    Hemorrhage of rectum and anus [K62.5]     Centrilobular emphysema ([address] [J43.2] 04/30/2019    CRP elevated [R79.82]     Elevated procalcitonin [R79.89]     Bandemia [D72.825]     Cholecystitis [K81.9]     Abdominal distention [R14.0]     Elevated CEA [R97.0]     Elevated CA 19-9 level [R97.8]     Urinary retention [R33.9]     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]     Septic shock (HCC) [A41.9, R65.21]     Leukemoid reaction [D72.823]     Aspiration pneumonia of right lower lobe due to vomit ([address] [J69.0]     MRSA carrier [Z22.322]     Sepsis due to urinary tract infection ([facility] [A41.9, N39.0] 04/11/2019    Cystitis [N30.90] 04/11/2019       Plan:        SBO, chronic cholecystitis s/p ex-lap, cholecystectomy, R colectomy w/ ileocolic anastomosis, enterolysis, POD#1  - Full code  - Intubated, sedated on vent  - CXR qd  - Levophed 8, propofol 10  -  ml/hr  - Surgery following - NGT LIWS  - Lovenox 30mg qd   - Dilaudid PRN  - ID following - Meropenem, added Vanco (pharm dose) for elevated leukocytosis (33.1 from 9.8)   - Clinda x 1   - Pulm following  - Contact and droplet   - Hgb 9.0, stable for several days, Transfuse Hgb <7.0  - Monitor VS  - ABG qd, BMP, Coag    Hypomagnesemia  - Mg 1.2 (from 1.5)  - Mag sulf replace     Dispo  - PT/OT  - SW - discussing [facility] for rehab, Burke Newell MD  5/16/2019  7:37 AM   Attending Physician Statement  Patient seen and examined  I have discussed the care of the patient, including pertinent history and exam findings,  with the resident. I have reviewed the key elements of all parts of the encounter with the resident. I agree with the assessment, plan and orders as documented by the resident. cc time > 30 min    Lizbeth Kapoor

## 2019-05-16 NOTE — PROGRESS NOTES
Physical Therapy  DATE: 2019    NAME: Angelika Jay  MRN: [id number]   : 1950    Patient not seen this date for Physical Therapy due to:  [] Blood transfusion in progress  [] Hemodialysis  []  Patient Declined  [] Spine Precautions   [] Strict Bedrest  [] Surgery/ Procedure  [] Testing      [x] Other: Respiratory with pt at [phone], doctors making rounds at [phone]. [] PT being discontinued at this time. Patient independent. No further needs. [] PT being discontinued at this time as the patient has been transferred to palliative care. No further needs.     Tavo Scott, PTA

## 2019-05-16 NOTE — CARE COORDINATION
DISCHARGE PLANNING NOTE:    Plan is for this patient to either go to [facility] [place] vs [facility]. Jeanne Mancia is working on appeal process with [employer]. Patient is POD #1 colon resection with open ashley. On VENT post-op. Will continue to follow along with LSW for any discharge needs.      Electronically signed by Jovita Alcantar RN on 5/16/2019 at 11:12 AM

## 2019-05-16 NOTE — PROGRESS NOTES
Infectious disease Consult Note      Patient: Mariela Worley  : 1950  Acct#:  [id number]     Date:  2019    Assessment:   Leukocytosis . Aspiration pneumonia . Shock  . Anemia/GI bleed followed by GI  SBO   Cholecystitis status post cholecystectomy tube  grew Candida non-albicans and one colony of ESBL Klebsiella on culture . finished ABXS course      Ecoli Emphysematous cystitis initially treated     Aspiration pneumonia of right lower lobe initially treated    Yeast growth on sputum culture suspect contamination     MRSA carrier    Urinary retention     Anemia      PCN allergy               Recommendations:     Continue IV meropenem add IV Vancomycin . Repeat blood cultures. Sputum culture   Lactic acid ,procalcitonin level   Follow CBC with diff  . Continue supportive care . Subjective:       History of Present Illness  Patient is a 77 y.o.  female admitted with Sepsis due to urinary tract infection   who is seen in consult for the same . She presented w dysuria and lower abd pain for around a week associated with vomiting ,was found hypotensive with leukocytosis . CT suggested emphysematous cystitis,possible gastric outlet obstruction. CXR showed a right lower infiltrate. High CA-19-9,CEA  Allergy to [facility] w SOB   Urine culture  grew ESCHERICHIA COLI resistant to Ampicillin ,sensitive to all other tested ABXS . Blood cultures negative . Mycoplasma IGM 0.97   YEAST MODERATE GROWTH on sputum culture   S/P EGD   with reported esophagitis. GB US Findings suggesting acute cholecystitis. HIIDA showed absent gallbladder filling. She was extubated on   Status post cholecystectomy tube  by interventional radiology,  cultures on  grew Candida non-albicans and one colony of ESBL Klebsiella. PICC line was placed   Tagged RBC scan  was negative .   CT abdomen  showed no fluid collection ,Bowel obstruction which is suspected to be caused by an internal hernia. NG tube was placed under fluoroscopy 5/9. She had a small bowel follow-through 5/9, developed respiratory failure with hypoxia, tachycardia and tachypnea was transferred to ICU placed on BiPAP, blood pressure on the low side required Levophed,WBC up to 28.5    code status was changed to [facility] on 5/13. Interval history :  S/p right colectomy with ileocolic anastomosis,open cholecystectomy and excision of small bowel diverticulum yesterday . She is still intubated ,awake ,WBC increased to 33   On 9 mcg of Levophed . No fever ,no BM   CXR showed continued left effusion and left basilar opacity. Past Medical History:   Diagnosis Date    Abnormal computed tomography of cervical spine     sclerotic bone appearance     CVA (cerebral vascular accident) ([address]     left  side weakness    GERD (gastroesophageal reflux disease)     Hypertension     Paraproteinemia     Weight loss       Past Surgical History:   Procedure Laterality Date    CHOLECYSTECTOMY N/A 5/15/2019    CHOLECYSTECTOMY performed by Mikie Almanzar DO at [facility]  4/14/2019         LAPAROSCOPY N/A 5/15/2019    LAPAROSCOPY EXPLORATORY CONVERTED TO EXPLORATORY LAPAROTOMY/ RIGHT COLON RESECTION AND ANASTAMOSIS/ OPEN CHOLECYSTECTOMY/ EXTENSIVE LYSIS OF ADHESIONS performed by Mikie Almanzar DO at [facility] 10  07/2011    Pacemaker is Medtronic Revo (compatible). Leads placed in 1995 are NOT MRI compatible. Placed at [facility]. V's per Dr. Corrie Samaniego can not have an MRI.  UPPER GASTROINTESTINAL ENDOSCOPY N/A 4/16/2019    EGD ESOPHAGOGASTRODUODENOSCOPY @ BEDSIDE  ICU 2002 performed by Sierra Padgett MD at [address]          Admission Meds  No current facility-administered medications on file prior to encounter.       Current Outpatient Medications on File Prior to Encounter   Medication Sig Dispense Refill    oxyCODONE-acetaminophen (PERCOCET) 5-325 MG per tablet Take 1 tablet by mouth every 6 hours as needed for Pain.  senna-docusate (PERICOLACE) 8.6-50 MG per tablet Take 1 tablet by mouth 2 times daily      budesonide-formoterol (SYMBICORT) 160-4.5 MCG/ACT AERO Inhale 2 puffs into the lungs 2 times daily      sucralfate (CARAFATE) 1 GM/10ML suspension Take 1 g by mouth 4 times daily       traZODone (DESYREL) 50 MG tablet Take 50 mg by mouth nightly      tiZANidine (ZANAFLEX) 2 MG tablet Take 2 mg by mouth every 8 hours as needed (left knee)       aspirin 81 MG tablet Take 81 mg by mouth daily      clopidogrel (PLAVIX) 75 MG tablet TAKE 1 TABLET DAILY 30 tablet 2    DOCQLACE 100 MG capsule TAKE 1 CAPSULE BY MOUTH IN THE MORNING & IN THE EVENING -DRINK PLENTYOF WATER WHILE TAKING THIS MEDICINE 30 capsule 1    amLODIPine (NORVASC) 10 MG tablet Take 10 mg by mouth daily.  LORazepam (ATIVAN) 0.5 MG tablet Take 0.5 mg by mouth every 6 hours as needed for Anxiety.  therapeutic multivitamin-minerals (THERAGRAN-M) tablet Take 1 tablet by mouth daily.  omeprazole (PRILOSEC) 20 MG capsule Take 20 mg by mouth daily.  venlafaxine (EFFEXOR) 37.5 MG tablet Take 37.5 mg by mouth 3 times daily.  Misc. Devices [facility]) [address] wheelchair with left fupper extremity support  Dx: stroke with left hemiparesis. 1 each 0           Allergies  Allergies   Allergen Reactions    Penicillins Shortness Of Breath and Rash    Amoxicillin         Social   Social History     Tobacco Use    Smoking status: Current Some Day Smoker     Packs/day: 1.00     Years: 30.00     Pack years: 30.00    Smokeless tobacco: Never Used   Substance Use Topics    Alcohol use: Not Currently     Alcohol/week: 16.8 oz     Types: 28 Glasses of wine per week                History reviewed. No pertinent family history. Review of Systems  Unable to provide     Tolerating antibiotics.          Physical Exam  BP (!) 109/46   Pulse 99   Temp 98 °F (36.7 °C) (Axillary)   Resp 17   Ht 5' (1.524 m)   Wt 105 lb 2.6 oz (47.7 kg)   SpO2 100%   BMI 20.54 kg/m²           General Appearance:intubated  . Skin: warm and dry, no rash or erythema  Head: normocephalic and atraumatic  Eyes: pupils equal, round  Neck: neck supple and non tender   Pulmonary/Chest: coarse to auscultation bilaterally- with  rhonchi  Cardiovascular: normal rate, regular rhythm, normal S1 and S2, no murmurs. Abdomen: soft,surgical dressing in place ,MORGAN drain bloody   Extremities: no cyanosis, clubbing   Edema   PICC line in place       Data Review:    Recent Labs     05/14/19  1006 05/15/19  0533 05/16/19  0601   WBC 12.4* 9.8 33.1*   HGB 9.4* 8.8* 9.0*   HCT 29.5* 27.7* 28.0*   MCV 85.4 86.8 86.9    205 275     Recent Labs     05/15/19  0533 05/16/19  0412    138   K 3.5* 3.6*    106   CO2 22 20   PHOS  --  2.6   BUN 5* 8   CREATININE <0.40* <0.40*     No results for input(s): AST, ALT, ALB, BILIDIR, BILITOT, ALKPHOS in the last 72 hours. Recent Labs     05/16/19  0412   AMYLASE 65     Recent Labs     05/15/19  0801 05/16/19  0412   PROTIME 15.6* 16.3*   INR 1.2 1.3       Imaging Studies:                           All appropriate imaging studies and reports reviewed: Yes       Ct Abdomen Pelvis Wo Contrast Additional Contrast? Oral    Result Date: 4/14/2019  EXAMINATION: CT OF THE ABDOMEN AND PELVIS WITHOUT CONTRAST 4/14/2019 7:34 pm TECHNIQUE: CT of the abdomen and pelvis was performed without the administration of intravenous contrast. Multiplanar reformatted images are provided for review. Dose modulation, iterative reconstruction, and/or weight based adjustment of the mA/kV was utilized to reduce the radiation dose to as low as reasonably achievable. COMPARISON: 04/11/2019 HISTORY: ORDERING SYSTEM PROVIDED HISTORY: ABDOMINAL PAIN TECHNOLOGIST PROVIDED HISTORY: Water soluble contrast only please Ordering Physician Provided Reason for Exam: Abdominal pain - Vented patient. Contrast given via nurse through NG tube.  Acuity: Unknown Type of Exam: Unknown Relevant Medical/Surgical History: Hx - Sepsis due to urinary tract infection. FINDINGS: Lower Chest: New moderate layering bilateral pleural effusions with bilateral lower lobe atelectasis. Organs: Limited evaluation due lack of intravenous contrast.  Cholelithiasis redemonstrated. No gallbladder wall thickening or biliary ductal dilatation. Scattered tiny hypodense lesions in the liver are too small to characterize but statistically represent benign cysts or hemangiomas and appear unchanged. The pancreas, spleen, adrenal glands, and kidneys are unremarkable. There is no hydronephrosis or urinary tract calculus. GI/Bowel: The stomach is distended. Enteric tube is in place. No contrast is seen distal to the pylorus and there is contrast reflux into the distal esophagus. There is no evidence of bowel obstruction. The appendix is not definitely visualized. No focal pericecal inflammatory changes are evident. Pelvis: The urinary bladder is decompressed by Tran catheter. No pelvic mass is seen. Peritoneum/Retroperitoneum: Small amount of free fluid in the pelvic cavity. No free air or focal fluid collection. No abnormal lymph node. Normal abdominal aortic caliber. Moderate calcific atherosclerosis. Bones/Soft Tissues: No acute osseous abnormality. Diffuse anasarca. Moderate degenerative changes in the lumbar spine. 1. Distended, contrast filled stomach with reflux of contrast into the esophagus. No enteric contrast is seen distal to the pylorus suggesting delayed gastric emptying in the setting of ileus. 2. New moderate layering bilateral pleural effusions with bilateral lower lobe atelectasis. 3. Small amount of nonspecific free fluid in the pelvic cavity. 4. Anasarca. 5. Cholelithiasis.      Xr Chest (single View Frontal)    Result Date: 4/13/2019  EXAMINATION: SINGLE XRAY VIEW OF THE CHEST 4/13/2019 7:18 am COMPARISON: April 12, 2019 HISTORY: ORDERING SYSTEM PROVIDED HISTORY: dyspnea TECHNOLOGIST PROVIDED HISTORY: dyspnea Ordering Physician Provided Reason for Exam: dyspnea Acuity: Acute Type of Exam: Subsequent/Follow-up Additional signs and symptoms: dyspnea FINDINGS: A right IJ catheter is seen with its tip terminating at the superior cavoatrial junction. The left chest wall pacemaker and leads are stable. The cardiomediastinal silhouette is stable. There is interval increased opacity at the right lung base, may be related to atelectasis versus pneumonia. There is small atelectasis and mild pleural effusion at the left lung base. There is no pneumothorax. There is no acute osseous abnormality. Interval increased opacity at the right lung base, may be related to atelectasis versus pneumonia. Small atelectasis and mild pleural effusion at the left lung, new since the prior study. Xr Femur Left (min 2 Views)    Result Date: 3/27/2019  EXAMINATION: 4 XRAY VIEWS OF THE LEFT FEMUR; 2 XRAY VIEWS OF THE LEFT KNEE; 3 XRAY VIEWS OF THE LEFT TIBIA AND FIBULA 3/27/2019 7:00 pm COMPARISON: Left hip 11/14/2015. HISTORY: ORDERING SYSTEM PROVIDED HISTORY: pain TECHNOLOGIST PROVIDED HISTORY: pain Ordering Physician Provided Reason for Exam: pt twisted wrong, lt knee pain, unable to straighten knee. Acuity: Acute Type of Exam: Initial FINDINGS: Left femur, four views: The bones are diffusely osteopenic. No acute fracture deformity. The hip joint is maintained. The femoral head projects within the acetabulum. No focal soft tissue abnormality is seen. Left knee, two views: The knee is flexed. No acute osseous abnormality. No joint effusion. Joint spaces are not optimally profiled but no significant arthritic changes are apparent. Left tib-fib, three views: There is evidence of a remote healed distal tibia fracture. No acute fracture or dislocation is seen. No focal soft tissue abnormality. No acute osseous abnormality of the left femur.  No acute osseous abnormality of the left femoral metaphysis. Osteopenia. Xr Tibia Fibula Left (2 Views)    Result Date: 3/27/2019  EXAMINATION: 4 XRAY VIEWS OF THE LEFT FEMUR; 2 XRAY VIEWS OF THE LEFT KNEE; 3 XRAY VIEWS OF THE LEFT TIBIA AND FIBULA 3/27/2019 7:00 pm COMPARISON: Left hip 11/14/2015. HISTORY: ORDERING SYSTEM PROVIDED HISTORY: pain TECHNOLOGIST PROVIDED HISTORY: pain Ordering Physician Provided Reason for Exam: pt twisted wrong, lt knee pain, unable to straighten knee. Acuity: Acute Type of Exam: Initial FINDINGS: Left femur, four views: The bones are diffusely osteopenic. No acute fracture deformity. The hip joint is maintained. The femoral head projects within the acetabulum. No focal soft tissue abnormality is seen. Left knee, two views: The knee is flexed. No acute osseous abnormality. No joint effusion. Joint spaces are not optimally profiled but no significant arthritic changes are apparent. Left tib-fib, three views: There is evidence of a remote healed distal tibia fracture. No acute fracture or dislocation is seen. No focal soft tissue abnormality. No acute osseous abnormality of the left femur. No acute osseous abnormality of the left knee. No acute osseous abnormality of the left tib-fib. Healed remote distal tibia fracture. Marked osteopenia, likely due to disuse. Xr Abdomen (kub) (single Ap View)    Result Date: 4/14/2019  EXAMINATION: SINGLE SUPINE XRAY VIEW(S) OF THE ABDOMEN 4/14/2019 7:39 am COMPARISON: CT abdomen and pelvis  film from 11 April 2019 HISTORY: [facility]: Abd Distention TECHNOLOGIST PROVIDED HISTORY: Abd Distention Ordering Physician Provided Reason for Exam: Abdominal distention. Pt was moving around in the bed. Best films at present time. Acuity: Acute Type of Exam: Initial Additional signs and symptoms: Abdominal distention. Pt was moving around in the bed. Best films at present time.  FINDINGS: Portable view time stamped at 748 hours demonstrates an intestinal bilaterally. Extrarenal collecting systems are noted. The ureters are mildly dilated down to the urinary bladder without evidence of intraluminal or ureteral obstructing calculi. GI/Bowel: Marked distention of the stomach is noted; this continues to the pylorus with appearance suggesting partial gastric outlet obstruction. Fluid is present in some small bowel loops and colon distal to the stomach. No small bowel obstruction. Pelvis: Marked distention of the urinary bladder is noted. 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 Distended ureters may be related to reflux or infection. No free pelvic fluid, pelvic or inguinal adenopathy is noted. Peritoneum/Retroperitoneum: No aortic aneurysm. Mild to moderate plaque is noted in the infrarenal abdominal aorta and both proximal iliac arteries. Shotty lymph nodes are present around the aorta in the upper abdomen. No mesenteric adenopathy is noted. Bones/Soft Tissues: Degenerative changes are present in the hips and lower lumbar facets. Estimated biologic radiation dose for this procedure:258.77 mGy/cm2.     1. Dilatation of the thoracic esophagus filled with fluid. No obstructive process is noted. No adjacent enlarged lymph nodes. 2. Trace pleural fluid. 3. Marked distention of the stomach. This appears to extend to the pylorus. Partial gastric outlet obstruction is not excluded. 4. Bilateral dilated ureters without obstructing calculi. Urinary bladder is markedly distended with bladder wall air suggesting emphysematous cystitis. Dilatation of the ureters may be related to reflux or infection. 5. Atherosclerotic disease. 6. Other findings as above. Critical results were called by Dr. Tariq Rothman MD to [address] on 4/11/2019 at 17:37.      Xr Chest Portable    Result Date: 4/16/2019  EXAMINATION: SINGLE XRAY VIEW OF THE CHEST 4/16/2019 6:54 am COMPARISON: 04/15/2019, 610 hours HISTORY: ORDERING SYSTEM PROVIDED HISTORY: ETT placement TECHNOLOGIST PROVIDED HISTORY: ETT placement Ordering Physician Provided Reason for Exam: on vent Acuity: Acute Type of Exam: Initial 68-year-old female on ventilator; check endotracheal tube placement FINDINGS: Portable AP upright view of the chest. Endotracheal tube distal tip overlying the mid trachea approximately 4.1 cm above the level of the ron. Enteric tube traverses the GE junction with distal tip excluded from the field of view. Left subclavian approach cardiac pacemaker device distal lead tips relatively stable in position. Right internal jugular approach central venous catheter distal tip overlying the high right atrium, stable. Cardiac monitor leads overlie the chest. Atherosclerotic calcification of the thoracic aorta. Slight stable volume loss of the left hemithorax. No pneumothorax. No free air. Dense retrocardiac/left basilar airspace consolidation and small left-sided pleural effusion. Stable mild focal opacity at the right mid lung zone. Underlying COPD. Stable mild pulmonary vascular congestion and left-sided predominant parahilar opacity. Visualized osseous structures remain unchanged. 1. Stable multifocal airspace disease as detailed above with dense retrocardiac/left basilar airspace consolidation and small left-sided pleural effusion. Mild pulmonary vascular congestion. Findings may represent edema or multifocal pneumonia. 2. Underlying COPD. 3. Tubes and line as detailed above. Xr Chest Portable    Result Date: 4/15/2019  EXAMINATION: SINGLE XRAY VIEW OF THE CHEST 4/15/2019 6:47 am COMPARISON: 14 April 2019 HISTORY: ORDERING SYSTEM PROVIDED HISTORY: ETT placement TECHNOLOGIST PROVIDED HISTORY: ETT placement Ordering Physician Provided Reason for Exam: on vent Acuity: Acute Type of Exam: Initial FINDINGS: AP portable view of the chest time stamped at 612 hours demonstrates overlying cardiac monitoring electrodes.   Endotracheal tube terminates 4 cm above the ron. Bipolar pacemaker enters from the left with intact leads in appropriate positions. Intestinal tube extends beyond the fundus of the stomach, tip not included. Right internal jugular catheter terminates at the cavoatrial junction. Heart size is normal.  Aortic arch is calcified. There is interval improvement in vascular congestion with resolution of perihilar opacities. Some bibasilar opacities remain. No extrapleural air is noted. Osseous structures are stable. Interval improvement in vascular congestion and bilateral opacities consistent with resolving pulmonary edema. Tubes and lines as above. Xr Chest Portable    Result Date: 4/14/2019  EXAMINATION: SINGLE XRAY VIEW OF THE CHEST 4/14/2019 7:57 am COMPARISON: Portable chest 04/13/2019. HISTORY: ORDERING SYSTEM PROVIDED HISTORY: Intubation TECHNOLOGIST PROVIDED HISTORY: Intubation Ordering Physician Provided Reason for Exam: intubation Acuity: Acute Type of Exam: Initial Additional signs and symptoms: intubation FINDINGS: Endotracheal tube terminates over the midthoracic trachea. Dual-chamber pacemaker leads appear unchanged in position. Right IJ approach central venous catheter unchanged in position. Heart size not substantially changed. Perihilar and basilar opacities further increased. Left pleural effusion increased in size. Findings may reflect pulmonary edema, progressed from yesterday's exam.  Left pleural effusion increased in size. Xr Chest Portable    Result Date: 4/12/2019  EXAMINATION: SINGLE XRAY VIEW OF THE CHEST 4/12/2019 5:26 am COMPARISON: November 14, 2015. HISTORY: ORDERING SYSTEM PROVIDED HISTORY: line placement TECHNOLOGIST PROVIDED HISTORY: line placement Ordering Physician Provided Reason for Exam: New right side line placement. Acuity: Acute Type of Exam: Initial Additional signs and symptoms: New right side line placement. FINDINGS: Stable left pectoral trans venous cardiac pacer device.   New right IJ central venous catheter with tip near the superior atrial caval junction. Normal lung volume. No new consolidation. Curvilinear radiopacity projecting over the right upper lobe likely represents artifact from a skin fold. No pleural effusion or pneumothorax. Stable cardiomediastinal silhouette and great vessels with redemonstration of atherosclerotic thoracic aorta. New right IJ central venous catheter with tip near the superior atrial caval junction. No pneumothorax. No new consolidation. Thank you for allowing me to participate in the care of your patient. Please feel free to contact me with any questions or concerns.      Rashard Bailey MD

## 2019-05-17 ENCOUNTER — APPOINTMENT (OUTPATIENT)
Dept: GENERAL RADIOLOGY | Age: 69
DRG: 853 | End: 2019-05-17
Payer: COMMERCIAL

## 2019-05-17 LAB
ABSOLUTE EOS #: 0 K/UL (ref 0–0.4)
ABSOLUTE IMMATURE GRANULOCYTE: ABNORMAL K/UL (ref 0–0.3)
ABSOLUTE LYMPH #: 0.93 K/UL (ref 1–4.8)
ABSOLUTE MONO #: 0.19 K/UL (ref 0.1–1.3)
ALBUMIN SERPL-MCNC: 2.6 G/DL (ref 3.5–5.2)
ALLEN TEST: ABNORMAL
ANION GAP SERPL CALCULATED.3IONS-SCNC: 11 MMOL/L (ref 9–17)
BASOPHILS # BLD: 0 % (ref 0–2)
BASOPHILS ABSOLUTE: 0 K/UL (ref 0–0.2)
BUN BLDV-MCNC: 6 MG/DL (ref 8–23)
BUN/CREAT BLD: ABNORMAL (ref 9–20)
C-REACTIVE PROTEIN: 144.3 MG/L (ref 0–5)
CALCIUM SERPL-MCNC: 7.8 MG/DL (ref 8.6–10.4)
CARBOXYHEMOGLOBIN: 0.9 % (ref 0–5)
CHLORIDE BLD-SCNC: 105 MMOL/L (ref 98–107)
CO2: 22 MMOL/L (ref 20–31)
CREAT SERPL-MCNC: <0.4 MG/DL (ref 0.5–0.9)
DIFFERENTIAL TYPE: ABNORMAL
EOSINOPHILS RELATIVE PERCENT: 0 % (ref 0–4)
FIO2: 30
GFR AFRICAN AMERICAN: ABNORMAL ML/MIN
GFR NON-AFRICAN AMERICAN: ABNORMAL ML/MIN
GFR SERPL CREATININE-BSD FRML MDRD: ABNORMAL ML/MIN/{1.73_M2}
GFR SERPL CREATININE-BSD FRML MDRD: ABNORMAL ML/MIN/{1.73_M2}
GLUCOSE BLD-MCNC: 126 MG/DL (ref 70–99)
HCO3 ARTERIAL: 23.6 MMOL/L (ref 22–26)
HCT VFR BLD CALC: 21.4 % (ref 36–46)
HCT VFR BLD CALC: 23.5 % (ref 36–46)
HEMOGLOBIN: 6.9 G/DL (ref 12–16)
HEMOGLOBIN: 7.4 G/DL (ref 12–16)
IMMATURE GRANULOCYTES: ABNORMAL %
INR BLD: 1.3
LYMPHOCYTES # BLD: 5 % (ref 24–44)
MAGNESIUM: 2 MG/DL (ref 1.6–2.6)
MCH RBC QN AUTO: 26.9 PG (ref 26–34)
MCHC RBC AUTO-ENTMCNC: 31.6 G/DL (ref 31–37)
MCV RBC AUTO: 85.2 FL (ref 80–100)
METHEMOGLOBIN: 0.4 % (ref 0–1.9)
MODE: ABNORMAL
MONOCYTES # BLD: 1 % (ref 1–7)
MORPHOLOGY: ABNORMAL
NEGATIVE BASE EXCESS, ART: 0.1 MMOL/L (ref 0–2)
NOTIFICATION TIME: ABNORMAL
NOTIFICATION: ABNORMAL
NRBC AUTOMATED: ABNORMAL PER 100 WBC
O2 DEVICE/FLOW/%: ABNORMAL
O2 SAT, ARTERIAL: 94.4 % (ref 95–98)
OXYHEMOGLOBIN: ABNORMAL % (ref 95–98)
PARTIAL THROMBOPLASTIN TIME: 36.5 SEC (ref 24–36)
PATIENT TEMP: 37
PCO2 ARTERIAL: 32.7 MMHG (ref 35–45)
PCO2, ART, TEMP ADJ: ABNORMAL (ref 35–45)
PDW BLD-RTO: 17.1 % (ref 11.5–14.9)
PEEP/CPAP: 5
PH ARTERIAL: 7.47 (ref 7.35–7.45)
PH, ART, TEMP ADJ: ABNORMAL (ref 7.35–7.45)
PHOSPHORUS: 1.5 MG/DL (ref 2.6–4.5)
PLATELET # BLD: 257 K/UL (ref 150–450)
PLATELET ESTIMATE: ABNORMAL
PMV BLD AUTO: 8.8 FL (ref 6–12)
PO2 ARTERIAL: 65.5 MMHG (ref 80–100)
PO2, ART, TEMP ADJ: ABNORMAL MMHG (ref 80–100)
POSITIVE BASE EXCESS, ART: ABNORMAL MMOL/L (ref 0–2)
POTASSIUM SERPL-SCNC: 3.2 MMOL/L (ref 3.7–5.3)
PROCALCITONIN: 0.83 NG/ML
PROTHROMBIN TIME: 16.1 SEC (ref 11.8–14.6)
PSV: ABNORMAL
PT. POSITION: ABNORMAL
RBC # BLD: 2.76 M/UL (ref 4–5.2)
RBC # BLD: ABNORMAL 10*6/UL
RESPIRATORY RATE: 16
SAMPLE SITE: ABNORMAL
SEG NEUTROPHILS: 94 % (ref 36–66)
SEGMENTED NEUTROPHILS ABSOLUTE COUNT: 17.48 K/UL (ref 1.3–9.1)
SET RATE: 16
SODIUM BLD-SCNC: 138 MMOL/L (ref 135–144)
TEXT FOR RESPIRATORY: ABNORMAL
TOTAL HB: ABNORMAL G/DL (ref 12–16)
TOTAL RATE: 16
TRIGL SERPL-MCNC: 145 MG/DL
VANCOMYCIN TROUGH DATE LAST DOSE: NORMAL
VANCOMYCIN TROUGH DOSE AMOUNT: 750
VANCOMYCIN TROUGH TIME LAST DOSE: 852
VANCOMYCIN TROUGH: 16.6 UG/ML (ref 10–20)
VT: 500
WBC # BLD: 18.6 K/UL (ref 3.5–11)
WBC # BLD: ABNORMAL 10*3/UL

## 2019-05-17 PROCEDURE — 80048 BASIC METABOLIC PNL TOTAL CA: CPT

## 2019-05-17 PROCEDURE — 6360000002 HC RX W HCPCS: Performed by: SURGERY

## 2019-05-17 PROCEDURE — 2500000003 HC RX 250 WO HCPCS: Performed by: ANESTHESIOLOGY

## 2019-05-17 PROCEDURE — 82040 ASSAY OF SERUM ALBUMIN: CPT

## 2019-05-17 PROCEDURE — 80202 ASSAY OF VANCOMYCIN: CPT

## 2019-05-17 PROCEDURE — 2500000003 HC RX 250 WO HCPCS: Performed by: STUDENT IN AN ORGANIZED HEALTH CARE EDUCATION/TRAINING PROGRAM

## 2019-05-17 PROCEDURE — 86900 BLOOD TYPING SEROLOGIC ABO: CPT

## 2019-05-17 PROCEDURE — 83735 ASSAY OF MAGNESIUM: CPT

## 2019-05-17 PROCEDURE — 94003 VENT MGMT INPAT SUBQ DAY: CPT

## 2019-05-17 PROCEDURE — 71045 X-RAY EXAM CHEST 1 VIEW: CPT

## 2019-05-17 PROCEDURE — 82805 BLOOD GASES W/O2 SATURATION: CPT

## 2019-05-17 PROCEDURE — C9113 INJ PANTOPRAZOLE SODIUM, VIA: HCPCS | Performed by: SURGERY

## 2019-05-17 PROCEDURE — 85025 COMPLETE CBC W/AUTO DIFF WBC: CPT

## 2019-05-17 PROCEDURE — 99233 SBSQ HOSP IP/OBS HIGH 50: CPT | Performed by: INTERNAL MEDICINE

## 2019-05-17 PROCEDURE — P9047 ALBUMIN (HUMAN), 25%, 50ML: HCPCS | Performed by: STUDENT IN AN ORGANIZED HEALTH CARE EDUCATION/TRAINING PROGRAM

## 2019-05-17 PROCEDURE — 97110 THERAPEUTIC EXERCISES: CPT

## 2019-05-17 PROCEDURE — 2580000003 HC RX 258: Performed by: SURGERY

## 2019-05-17 PROCEDURE — 6370000000 HC RX 637 (ALT 250 FOR IP): Performed by: INTERNAL MEDICINE

## 2019-05-17 PROCEDURE — 85730 THROMBOPLASTIN TIME PARTIAL: CPT

## 2019-05-17 PROCEDURE — 97530 THERAPEUTIC ACTIVITIES: CPT

## 2019-05-17 PROCEDURE — 2580000003 HC RX 258: Performed by: ANESTHESIOLOGY

## 2019-05-17 PROCEDURE — 85610 PROTHROMBIN TIME: CPT

## 2019-05-17 PROCEDURE — 6360000002 HC RX W HCPCS: Performed by: STUDENT IN AN ORGANIZED HEALTH CARE EDUCATION/TRAINING PROGRAM

## 2019-05-17 PROCEDURE — 2000000000 HC ICU R&B

## 2019-05-17 PROCEDURE — 6360000002 HC RX W HCPCS: Performed by: INTERNAL MEDICINE

## 2019-05-17 PROCEDURE — 36415 COLL VENOUS BLD VENIPUNCTURE: CPT

## 2019-05-17 PROCEDURE — 2580000003 HC RX 258: Performed by: INTERNAL MEDICINE

## 2019-05-17 PROCEDURE — 94761 N-INVAS EAR/PLS OXIMETRY MLT: CPT

## 2019-05-17 PROCEDURE — 85014 HEMATOCRIT: CPT

## 2019-05-17 PROCEDURE — 2500000003 HC RX 250 WO HCPCS: Performed by: SURGERY

## 2019-05-17 PROCEDURE — 99291 CRITICAL CARE FIRST HOUR: CPT | Performed by: INTERNAL MEDICINE

## 2019-05-17 PROCEDURE — 84100 ASSAY OF PHOSPHORUS: CPT

## 2019-05-17 PROCEDURE — 86140 C-REACTIVE PROTEIN: CPT

## 2019-05-17 PROCEDURE — 84478 ASSAY OF TRIGLYCERIDES: CPT

## 2019-05-17 PROCEDURE — 2700000000 HC OXYGEN THERAPY PER DAY

## 2019-05-17 PROCEDURE — 84145 PROCALCITONIN (PCT): CPT

## 2019-05-17 PROCEDURE — 2580000003 HC RX 258: Performed by: STUDENT IN AN ORGANIZED HEALTH CARE EDUCATION/TRAINING PROGRAM

## 2019-05-17 PROCEDURE — 94640 AIRWAY INHALATION TREATMENT: CPT

## 2019-05-17 PROCEDURE — 85018 HEMOGLOBIN: CPT

## 2019-05-17 PROCEDURE — 6360000002 HC RX W HCPCS: Performed by: ANESTHESIOLOGY

## 2019-05-17 PROCEDURE — 36600 WITHDRAWAL OF ARTERIAL BLOOD: CPT

## 2019-05-17 PROCEDURE — P9016 RBC LEUKOCYTES REDUCED: HCPCS

## 2019-05-17 RX ORDER — DEXTROSE MONOHYDRATE 25 G/50ML
12.5 INJECTION, SOLUTION INTRAVENOUS PRN
Status: DISCONTINUED | OUTPATIENT
Start: 2019-05-17 | End: 2019-05-31 | Stop reason: HOSPADM

## 2019-05-17 RX ORDER — MIDAZOLAM HYDROCHLORIDE 1 MG/ML
1 INJECTION INTRAMUSCULAR; INTRAVENOUS
Status: DISCONTINUED | OUTPATIENT
Start: 2019-05-17 | End: 2019-05-17

## 2019-05-17 RX ORDER — ALBUMIN (HUMAN) 12.5 G/50ML
50 SOLUTION INTRAVENOUS EVERY 8 HOURS
Status: DISCONTINUED | OUTPATIENT
Start: 2019-05-17 | End: 2019-05-19

## 2019-05-17 RX ORDER — 0.9 % SODIUM CHLORIDE 0.9 %
250 INTRAVENOUS SOLUTION INTRAVENOUS ONCE
Status: DISCONTINUED | OUTPATIENT
Start: 2019-05-17 | End: 2019-05-18

## 2019-05-17 RX ORDER — NICOTINE POLACRILEX 4 MG
15 LOZENGE BUCCAL PRN
Status: DISCONTINUED | OUTPATIENT
Start: 2019-05-17 | End: 2019-05-31 | Stop reason: HOSPADM

## 2019-05-17 RX ORDER — DEXTROSE MONOHYDRATE 50 MG/ML
100 INJECTION, SOLUTION INTRAVENOUS PRN
Status: DISCONTINUED | OUTPATIENT
Start: 2019-05-17 | End: 2019-05-31 | Stop reason: HOSPADM

## 2019-05-17 RX ORDER — FUROSEMIDE 10 MG/ML
20 INJECTION INTRAMUSCULAR; INTRAVENOUS 2 TIMES DAILY PRN
Status: DISCONTINUED | OUTPATIENT
Start: 2019-05-17 | End: 2019-05-18

## 2019-05-17 RX ORDER — 0.9 % SODIUM CHLORIDE 0.9 %
1000 INTRAVENOUS SOLUTION INTRAVENOUS ONCE
Status: DISCONTINUED | OUTPATIENT
Start: 2019-05-17 | End: 2019-05-17

## 2019-05-17 RX ADMIN — POTASSIUM CHLORIDE 10 MEQ: 7.46 INJECTION, SOLUTION INTRAVENOUS at 07:22

## 2019-05-17 RX ADMIN — HYDROCORTISONE SODIUM SUCCINATE 100 MG: 100 INJECTION, POWDER, FOR SOLUTION INTRAMUSCULAR; INTRAVENOUS at 00:55

## 2019-05-17 RX ADMIN — HYDROMORPHONE HYDROCHLORIDE 0.5 MG: 1 INJECTION, SOLUTION INTRAMUSCULAR; INTRAVENOUS; SUBCUTANEOUS at 00:29

## 2019-05-17 RX ADMIN — SODIUM PHOSPHATE, MONOBASIC, MONOHYDRATE 15.27 MMOL: 276; 142 INJECTION, SOLUTION INTRAVENOUS at 20:59

## 2019-05-17 RX ADMIN — HYDROCORTISONE SODIUM SUCCINATE 100 MG: 100 INJECTION, POWDER, FOR SOLUTION INTRAMUSCULAR; INTRAVENOUS at 08:50

## 2019-05-17 RX ADMIN — ENOXAPARIN SODIUM 30 MG: 30 INJECTION SUBCUTANEOUS at 08:40

## 2019-05-17 RX ADMIN — NOREPINEPHRINE BITARTRATE 3 MCG/MIN: 1 INJECTION INTRAVENOUS at 04:35

## 2019-05-17 RX ADMIN — Medication 10 ML: at 08:40

## 2019-05-17 RX ADMIN — MIDAZOLAM HYDROCHLORIDE 2 MG: 1 INJECTION, SOLUTION INTRAMUSCULAR; INTRAVENOUS at 12:11

## 2019-05-17 RX ADMIN — HYDROCORTISONE SODIUM SUCCINATE 100 MG: 100 INJECTION, POWDER, FOR SOLUTION INTRAMUSCULAR; INTRAVENOUS at 20:59

## 2019-05-17 RX ADMIN — MEROPENEM 1 G: 1 INJECTION, POWDER, FOR SOLUTION INTRAVENOUS at 10:30

## 2019-05-17 RX ADMIN — POTASSIUM CHLORIDE 10 MEQ: 7.46 INJECTION, SOLUTION INTRAVENOUS at 08:42

## 2019-05-17 RX ADMIN — ALBUMIN (HUMAN) 50 G: 0.25 INJECTION, SOLUTION INTRAVENOUS at 20:59

## 2019-05-17 RX ADMIN — Medication 10 ML: at 09:00

## 2019-05-17 RX ADMIN — HYDROMORPHONE HYDROCHLORIDE 0.5 MG: 1 INJECTION, SOLUTION INTRAMUSCULAR; INTRAVENOUS; SUBCUTANEOUS at 08:38

## 2019-05-17 RX ADMIN — PROPOFOL 25 MCG/KG/MIN: 10 INJECTION, EMULSION INTRAVENOUS at 00:54

## 2019-05-17 RX ADMIN — FUROSEMIDE 20 MG: 10 INJECTION, SOLUTION INTRAMUSCULAR; INTRAVENOUS at 13:40

## 2019-05-17 RX ADMIN — VANCOMYCIN HYDROCHLORIDE 750 MG: 5 INJECTION, POWDER, LYOPHILIZED, FOR SOLUTION INTRAVENOUS at 08:52

## 2019-05-17 RX ADMIN — SODIUM CHLORIDE, POTASSIUM CHLORIDE, SODIUM LACTATE AND CALCIUM CHLORIDE: 600; 310; 30; 20 INJECTION, SOLUTION INTRAVENOUS at 00:55

## 2019-05-17 RX ADMIN — IPRATROPIUM BROMIDE AND ALBUTEROL SULFATE 1 AMPULE: .5; 3 SOLUTION RESPIRATORY (INHALATION) at 15:20

## 2019-05-17 RX ADMIN — POTASSIUM CHLORIDE 10 MEQ: 7.46 INJECTION, SOLUTION INTRAVENOUS at 10:00

## 2019-05-17 RX ADMIN — CALCIUM GLUCONATE: 94 INJECTION, SOLUTION INTRAVENOUS at 19:04

## 2019-05-17 RX ADMIN — VANCOMYCIN HYDROCHLORIDE 750 MG: 5 INJECTION, POWDER, LYOPHILIZED, FOR SOLUTION INTRAVENOUS at 21:06

## 2019-05-17 RX ADMIN — HYDROMORPHONE HYDROCHLORIDE 0.5 MG: 1 INJECTION, SOLUTION INTRAMUSCULAR; INTRAVENOUS; SUBCUTANEOUS at 13:40

## 2019-05-17 RX ADMIN — IPRATROPIUM BROMIDE AND ALBUTEROL SULFATE 1 AMPULE: .5; 3 SOLUTION RESPIRATORY (INHALATION) at 07:49

## 2019-05-17 RX ADMIN — ALBUMIN (HUMAN) 50 G: 0.25 INJECTION, SOLUTION INTRAVENOUS at 11:00

## 2019-05-17 RX ADMIN — MEROPENEM 1 G: 1 INJECTION, POWDER, FOR SOLUTION INTRAVENOUS at 04:35

## 2019-05-17 RX ADMIN — PANTOPRAZOLE SODIUM 40 MG: 40 INJECTION, POWDER, FOR SOLUTION INTRAVENOUS at 08:40

## 2019-05-17 RX ADMIN — IPRATROPIUM BROMIDE AND ALBUTEROL SULFATE 1 AMPULE: .5; 3 SOLUTION RESPIRATORY (INHALATION) at 18:14

## 2019-05-17 RX ADMIN — SODIUM CHLORIDE 250 ML: 9 INJECTION, SOLUTION INTRAVENOUS at 18:05

## 2019-05-17 RX ADMIN — HYDROMORPHONE HYDROCHLORIDE 0.5 MG: 1 INJECTION, SOLUTION INTRAMUSCULAR; INTRAVENOUS; SUBCUTANEOUS at 16:06

## 2019-05-17 RX ADMIN — MIDAZOLAM HYDROCHLORIDE 2 MG: 1 INJECTION, SOLUTION INTRAMUSCULAR; INTRAVENOUS at 13:40

## 2019-05-17 RX ADMIN — MIDAZOLAM 2 MG/HR: 5 INJECTION INTRAMUSCULAR; INTRAVENOUS at 16:14

## 2019-05-17 RX ADMIN — MEROPENEM 1 G: 1 INJECTION, POWDER, FOR SOLUTION INTRAVENOUS at 18:04

## 2019-05-17 RX ADMIN — IPRATROPIUM BROMIDE AND ALBUTEROL SULFATE 1 AMPULE: .5; 3 SOLUTION RESPIRATORY (INHALATION) at 11:14

## 2019-05-17 RX ADMIN — HYDROCORTISONE SODIUM SUCCINATE 100 MG: 100 INJECTION, POWDER, FOR SOLUTION INTRAMUSCULAR; INTRAVENOUS at 14:00

## 2019-05-17 RX ADMIN — I.V. FAT EMULSION 100 ML: 20 EMULSION INTRAVENOUS at 19:05

## 2019-05-17 RX ADMIN — HYDROMORPHONE HYDROCHLORIDE 0.5 MG: 1 INJECTION, SOLUTION INTRAMUSCULAR; INTRAVENOUS; SUBCUTANEOUS at 20:59

## 2019-05-17 RX ADMIN — POTASSIUM CHLORIDE 10 MEQ: 7.46 INJECTION, SOLUTION INTRAVENOUS at 06:16

## 2019-05-17 ASSESSMENT — PULMONARY FUNCTION TESTS
PIF_VALUE: 19
PIF_VALUE: 38
PIF_VALUE: 23
PIF_VALUE: 23
PIF_VALUE: 22
PIF_VALUE: 17
PIF_VALUE: 24
PIF_VALUE: 18
PIF_VALUE: 20
PIF_VALUE: 22
PIF_VALUE: 36
PIF_VALUE: 32
PIF_VALUE: 23
PIF_VALUE: 22
PIF_VALUE: 18
PIF_VALUE: 38
PIF_VALUE: 23
PIF_VALUE: 17
PIF_VALUE: 22
PIF_VALUE: 21
PIF_VALUE: 22
PIF_VALUE: 23
PIF_VALUE: 26
PIF_VALUE: 17
PIF_VALUE: 19
PIF_VALUE: 22
PIF_VALUE: 23
PIF_VALUE: 22
PIF_VALUE: 22
PIF_VALUE: 20
PIF_VALUE: 18
PIF_VALUE: 23
PIF_VALUE: 32
PIF_VALUE: 24
PIF_VALUE: 23
PIF_VALUE: 22
PIF_VALUE: 21
PIF_VALUE: 19
PIF_VALUE: 23
PIF_VALUE: 33
PIF_VALUE: 23
PIF_VALUE: 18
PIF_VALUE: 23
PIF_VALUE: 18
PIF_VALUE: 44
PIF_VALUE: 20
PIF_VALUE: 38
PIF_VALUE: 24
PIF_VALUE: 21
PIF_VALUE: 26
PIF_VALUE: 23
PIF_VALUE: 17
PIF_VALUE: 27
PIF_VALUE: 18
PIF_VALUE: 24

## 2019-05-17 ASSESSMENT — PAIN SCALES - GENERAL
PAINLEVEL_OUTOF10: 7
PAINLEVEL_OUTOF10: 10
PAINLEVEL_OUTOF10: 10
PAINLEVEL_OUTOF10: 5
PAINLEVEL_OUTOF10: 0
PAINLEVEL_OUTOF10: 8
PAINLEVEL_OUTOF10: 0

## 2019-05-17 NOTE — FLOWSHEET NOTE
Patient vented but engaging; patient welcomed prayer and asked to see her nurse, Beatriz Donaldson; facilitated communication between patient and RNBeatriz in this regard; listening presence and support;     05/17/19 9536   Encounter Summary   Services provided to: Patient   Referral/Consult From: Palliative Care   Continue Visiting   (5/17/19)   Complexity of Encounter Moderate   Length of Encounter 15 minutes   Spiritual Assessment Completed Yes   Routine   Type Follow up   Spiritual/Rastafari   Type Spiritual support   Assessment Approachable; Anxious; Coping; Hopeful   Intervention Active listening;Explored feelings, thoughts, concerns;Prayer;Sustaining presence/ Ministry of presence; Discussed illness/injury and it's impact   Outcome Expressed gratitude;Engaged in conversation;Expressed feelings/needs/concerns;Coping; Hopeful;Receptive

## 2019-05-17 NOTE — PROGRESS NOTES
Physical Therapy  [facility]   Physical Therapy Progress Note    Date: 19  Patient Name: Leno Sullivan       Room:   MRN: [id number]   Account: [de-identified]   : 1950  (77 y.o.)   Gender: female     Discharge Recommendations   ECF with PT  Equipment Needed: No    Referring Practitioner: Digna Khan MD  Diagnosis: Sepsis due to UTI  Restrictions/Precautions: Fall Risk;contact (droplet/pneumonia)  Implants present? : Pacemaker  Other position/activity restrictions: LLE cast is removed    Past Medical History:  has a past medical history of Abnormal computed tomography of cervical spine, CVA (cerebral vascular accident) ([facility] [address] GERD (gastroesophageal reflux disease), Hypertension, Paraproteinemia, and Weight loss. Past Surgical History:   has a past surgical history that includes pacemaker placement (2011); intubation (2019); Upper gastrointestinal endoscopy (N/A, 2019); laparoscopy (N/A, 5/15/2019); and Cholecystectomy (N/A, 5/15/2019). Additional Pertinent Hx: admitted 19 due to abdominal pain     Restrictions/Precautions  Restrictions/Precautions: Fall Risk; droplet precautions (pneumonia)  Implants present? : Pacemaker  Position Activity Restriction  Other position/activity restrictions: LLE cast is removed     Subjective: Pt agreeable to UE AAROM/PROM, refused LE PROM; agreeable to reposition with nursing. Comments: VICKY Barber reports pt to have surgery, is NPO, low BP (71/37 via monitor, 85/44 taken with US to detect HR) s/p D/C of Levophed. B UE pitting edema, LEs much less edematous in comparison.      Vital Signs  Patient Currently in Pain: Denies(Grimacing with shoulder flexion, refuses further mvmt. )     Bed Mobility  Rolling: Rolling Left;Dependent/Total(x2; grimaces with repositioning 2* coccyx wound)    Transfers:  Sit to Stand: Unable to assess     Other exercises?: Yes  Other exercises 1: Bilateral UE ex, AAROM R UE, PROM L UE, to pt tolerance(Shoulder flexion >20* = grimacing; pt tolerated reduced ROM)  Other exercises 2: With nursing, pt positioned for comfort, edema reduction and decreased risk soft tissue breakdown(Pt indicated satisfaction upon completion)  Other exercises 3: Education on supine therex to maintain strength & help prevent DVTs in BLE      Assessment  Activity Tolerance: Patient limited by pain;Treatment limited secondary to medical complications (free text)(Self-limiting & anxious)   Body structures, Functions, Activity limitations: Decreased functional mobility ; Decreased ADL status; Decreased strength; Increased Pain  Assessment: Pt self-limiting/fearful & guarded with movement. Specific instructions for Next Treatment: progress to mobility as able  Prognosis: Good  Discharge Recommendations: ECF with PT     Type of devices: Left in bed;Nurse notified     Plan  Times per week: 5-7x/wk  Times per day: Daily  Current Treatment Recommendations: ROM, Strengthening, Functional Mobility Training, Transfer Training    Patient Education  New Education Provided: Repositioning & mobility for skin integrity & edema management.   Learner:patient  Method: explanation       Outcome: acknowledged understanding of, verbalized concerns and needs reinforcement     Goals  Short term goals  Time Frame for Short term goals: 10 visits  Short term goal 1: improve strength RUE/RLE to 4/5 to assist in bed mobility and transfers  Short term goal 2: improve bed mobility to minx1  Short term goal 3: improve transfers to minx1  Short term goal 4: progress to Gait and/or use of wheelchair for mobility    PT Individual Minutes  Time In: 0922  Time Out: 1005  Minutes: 43    Electronically signed by Devendra Flower PTA on 5/17/19 at 10:35 AM

## 2019-05-17 NOTE — FLOWSHEET NOTE
05/17/19 1000   Encounter Summary   Services provided to: Patient   Referral/Consult From: Palliative Care   Complexity of Encounter Low   Length of Encounter 15 minutes   Spiritual/Restoration   Type Spiritual support   Assessment Unable to respond   Intervention Prayer   Outcome Did not respond

## 2019-05-17 NOTE — PLAN OF CARE
RN  Outcome: Ongoing  Goal: Control of chronic pain  Description  Control of chronic pain  5/16/2019 1728 by Mario Haddad RN  Outcome: Ongoing     Problem: Nutrition  Goal: Optimal nutrition therapy  Description       5/16/2019 1728 by Mario Haddad RN  Outcome: Ongoing     Problem: Restraint Use - Nonviolent/Non-Self-Destructive Behavior:  Goal: Absence of restraint-related injury  Description  Absence of restraint-related injury    5/17/2019 0648 by Lindsey Beaver RN  Outcome: Ongoing  Note:   Restraints released and ROM performed every 2 hours and PRN. No injury noted. 5/16/2019 1728 by Mario Haddad RN  Outcome: Ongoing     Problem: Restraint Use - Nonviolent/Non-Self-Destructive Behavior:  Goal: Absence of restraint indications  Description  Absence of restraint indications    5/17/2019 0648 by Lindsye Beaver RN  Outcome: Not Met This Shift  Note:   Patient attempts to pull at lines/tubes when restraints are released. Restraints remain in place for patient safety. 5/16/2019 1728 by Mario Haddad RN  Outcome: Ongoing  Note:   Patient attempts to pull at line and tubes when unrestrained.  Restraints continue

## 2019-05-17 NOTE — PROGRESS NOTES
Nutrition Assessment (Parenteral Nutrition)    Type and Reason for Visit: Reassess, Consult(PN Ordering and Management)    Nutrition Recommendations: Initiate TPN with Clinimix 5/20 at 41.7 mL per hr with the following additives: Na Acetate: 20 mEq, Na Cl: 20 mEq, K Acetate: 20 mEq,  K Phosphate: 30 mMol, Calcium Gluconate: 4 mEq,  Mg Sulfate: 8 mEq, MVI and Trace Elements. 100 mL lipids. NPO. Nutrition Assessment: Pt will be improving from a nutritional standpoint as TPN to be re-started. Pt remains at risk for nutrition compromise due to NPO status and healing needs. Will monitor nutrition progression. Malnutrition Assessment:  · Malnutrition Status: At risk for malnutrition  · Context: Acute illness or injury  · Findings of the 6 clinical characteristics of malnutrition (Minimum of 2 out of 6 clinical characteristics is required to make the diagnosis of moderate or severe Protein Calorie Malnutrition based on AND/ASPEN Guidelines):  1. Energy Intake-Less than or equal to 50% of estimated energy requirement, (since 5-13)    2. Weight Loss-Unable to assess(edema present),    3. Fat Loss-Unable to assess(Pt is thin; edema present; no known recent loss of muscle or fat),    4. Muscle Loss-Unable to assess,    5. Fluid Accumulation-Moderate to severe fluid accumulation, Extremities  6.  Strength-Not measured    Nutrition Risk Level: High    Nutrient Needs:  · Estimated Daily Total Kcal: 4730-0890 based on 35-39 kcal per kg of usual body wt  · Estimated Daily Protein (g): 63-68 based on 1.4-1.5 gm/kg IBW(revised)    Nutrition Diagnosis:   · Problem: Inadequate oral intake  · Etiology: related to Alteration in GI function, Insufficient energy/nutrient consumption     Signs and symptoms:  as evidenced by NPO status due to medical condition, GI abnormality    Objective Information:  · Nutrition-Focused Physical Findings: Hypoactive bowel sounds. Edema: +2 pitting RUE, RLE; +4 pitting LUE; +1 pitting LLE.

## 2019-05-17 NOTE — PLAN OF CARE
Nutrition Problem: Inadequate oral intake  Intervention: Food and/or Nutrient Delivery: Continue NPO, Start Parenteral Nutrition  Nutritional Goals: Adequate nutritional intake or provision

## 2019-05-17 NOTE — PROGRESS NOTES
Bedside rounding done with Dr. Jem Wright. New orders received to d/c diprivan and have versed PRN q1h. LR was decreased to keep total fluids at 60. Albumin ordered by residents and when SBP gets above 100, give 20 mg of Lasix to help with the increasing edema. Pt is not to be weaned nor extubated per physician d/t fluid overload. Pt is alert and following commands.

## 2019-05-17 NOTE — PROGRESS NOTES
[address] [facility]    PROGRESS NOTE             5/17/2019    9:07 AM    Name:   Ashvin Ferreira  MRN:     [id number]     Acct:      [de-identified]   Room:   2002/2002-01  IP Day:  39  Admit Date:  4/11/2019  2:48 PM    PCP:  Yonathan Fernandes DO  Code Status:  Full Code    Subjective:     C/C:   Chief Complaint   Patient presents with    Abdominal Pain     Interval History Status: not changed, stable    Patient seen and examined at bedside. Patient is postoperative day 2. She is currently intubated and sedated. She continues to need Levophed 1.9 per hour currently. She is on meropenem day 8 and vancomycin day 5. Brief History:     Per Epic EMR H&P:    \"The patient is a 77 y.o. Unavailable/unknown female who presents withAbdominal Pain   and she is admitted to the hospital for the management of abdominal distension, nausea, vomiting, and acute cystitis.      Patient presents with chills, nausea, vomiting, abdominal pain (diffuse, but worse in the suprapubic region), abdominal distension, and dysuria of 2-3 days duration. Denies hematemesis. Acknowledges difficulty keeping food down but tolerating fluids. States abdominal pain is a 6/10 on average, and denies trying any medication to alleviate her sx.      Denies recent antibiotic use or steroid use.      ED: Tachycardic 100-114 BP, WBC 47.9 w/neutrophils 88,  UCx from 4/2 + E. Coli > 100,000 w/suceptibility to cipro. \"    Review of Systems:     Review of Systems   Unable to perform ROS: Intubated     Medications: Allergies:     Allergies   Allergen Reactions    Penicillins Shortness Of Breath and Rash    Amoxicillin        Current Meds:   Scheduled Meds:    vancomycin (VANCOCIN) intermittent dosing (placeholder)   Other RX Placeholder    vancomycin  750 mg Intravenous Q12H    hydrocortisone sodium succinate PF  100 mg Intravenous Q6H    sodium chloride flush  10 mL Intravenous 2 times per day    enoxaparin  30 mg Subcutaneous Daily    pantoprazole  40 mg Intravenous Daily    And    sodium chloride (PF)  10 mL Intravenous Daily    ipratropium-albuterol  1 ampule Inhalation Q4H WA    meperidine  25 mg Intravenous Once    metoprolol  5 mg Intravenous Q12H    alteplase  1 mg Intracatheter Once    meropenem  1 g Intravenous Q8H    mometasone-formoterol  2 puff Inhalation BID     Continuous Infusions:    lactated ringers 100 mL/hr at 19 0055    propofol 25 mcg/kg/min (19)    norepinephrine 4 mcg/min (19 0851)     PRN Meds: sodium chloride flush, ondansetron, HYDROmorphone **OR** HYDROmorphone, potassium chloride, magnesium sulfate, metoprolol    Data:     Past Medical History:   has a past medical history of Abnormal computed tomography of cervical spine, CVA (cerebral vascular accident) ([address] GERD (gastroesophageal reflux disease), Hypertension, Paraproteinemia, and Weight loss. Social History:   reports that she has been smoking. She has a 30.00 pack-year smoking history. She has never used smokeless tobacco. She reports that she drank about 16.8 oz of alcohol per week. She reports that she does not use drugs. Family History: History reviewed. No pertinent family history. Vitals:  BP (!) 81/54   Pulse 127   Temp 97.7 °F (36.5 °C) (Oral)   Resp 16   Ht 5' (1.524 m)   Wt 105 lb 2.6 oz (47.7 kg)   SpO2 99%   BMI 20.54 kg/m²   Temp (24hrs), Av.3 °F (36.8 °C), Min:97.7 °F (36.5 °C), Max:98.6 °F (37 °C)    Recent Labs     19  1421 19  1806 05/15/19  0618   POCGLU 99 82 91       I/O(24Hr):     Intake/Output Summary (Last 24 hours) at 2019 0907  Last data filed at 2019 0725  Gross per 24 hour   Intake 4575.51 ml   Output 2475 ml   Net 2100.51 ml       Labs:    CBC:   Lab Results   Component Value Date    WBC 18.6 2019    RBC 2.76 2019    RBC 3.66 2012    HGB 7.4 2019    HCT 23.5 2019    MCV 85.2 05/17/2019    MCH 26.9 05/17/2019    MCHC 31.6 05/17/2019    RDW 17.1 05/17/2019     05/17/2019     05/23/2012    MPV 8.8 05/17/2019     CBC with Differential:    Lab Results   Component Value Date    WBC 18.6 05/17/2019    RBC 2.76 05/17/2019    RBC 3.66 05/23/2012    HGB 7.4 05/17/2019    HCT 23.5 05/17/2019     05/17/2019     05/23/2012    MCV 85.2 05/17/2019    MCH 26.9 05/17/2019    MCHC 31.6 05/17/2019    RDW 17.1 05/17/2019    METASPCT 2 05/15/2019    LYMPHOPCT 5 05/17/2019    MONOPCT 1 05/17/2019    MYELOPCT 1 05/07/2019    BASOPCT 0 05/17/2019    MONOSABS 0.19 05/17/2019    LYMPHSABS 0.93 05/17/2019    EOSABS 0.00 05/17/2019    BASOSABS 0.00 05/17/2019    DIFFTYPE NOT REPORTED 05/17/2019     CMP:    Lab Results   Component Value Date     05/17/2019    K 3.2 05/17/2019     05/17/2019    CO2 22 05/17/2019    BUN 6 05/17/2019    CREATININE <0.40 05/17/2019    GFRAA CANNOT BE CALCULATED 05/17/2019    LABGLOM CANNOT BE CALCULATED 05/17/2019    GLUCOSE 126 05/17/2019    GLUCOSE 87 05/21/2012    PROT 5.9 05/11/2019    LABALBU 1.9 05/11/2019    LABALBU 4.6 05/17/2012    CALCIUM 7.8 05/17/2019    BILITOT 0.38 05/11/2019    ALKPHOS 196 05/11/2019    AST 19 05/11/2019    ALT 18 05/11/2019     BMP:    Lab Results   Component Value Date     05/17/2019    K 3.2 05/17/2019     05/17/2019    CO2 22 05/17/2019    BUN 6 05/17/2019    LABALBU 1.9 05/11/2019    LABALBU 4.6 05/17/2012    CREATININE <0.40 05/17/2019    CALCIUM 7.8 05/17/2019    GFRAA CANNOT BE CALCULATED 05/17/2019    LABGLOM CANNOT BE CALCULATED 05/17/2019    GLUCOSE 126 05/17/2019    GLUCOSE 87 05/21/2012     BUN/Creatinine:    Lab Results   Component Value Date    BUN 6 05/17/2019    CREATININE <0.40 05/17/2019     Hepatic Function Panel:    Lab Results   Component Value Date    ALKPHOS 196 05/11/2019    ALT 18 05/11/2019    AST 19 05/11/2019    PROT 5.9 05/11/2019    BILITOT 0.38 05/11/2019    BILIDIR 0.21 reflux into the distal esophagus. There is no evidence of bowel obstruction. The appendix is not definitely visualized. No focal pericecal inflammatory changes are evident. Pelvis: The urinary bladder is decompressed by Tran catheter. No pelvic mass is seen. Peritoneum/Retroperitoneum: Small amount of free fluid in the pelvic cavity. No free air or focal fluid collection. No abnormal lymph node. Normal abdominal aortic caliber. Moderate calcific atherosclerosis. Bones/Soft Tissues: No acute osseous abnormality. Diffuse anasarca. Moderate degenerative changes in the lumbar spine. 1. Distended, contrast filled stomach with reflux of contrast into the esophagus. No enteric contrast is seen distal to the pylorus suggesting delayed gastric emptying in the setting of ileus. 2. New moderate layering bilateral pleural effusions with bilateral lower lobe atelectasis. 3. Small amount of nonspecific free fluid in the pelvic cavity. 4. Anasarca. 5. Cholelithiasis. Xr Chest (single View Frontal)    Result Date: 5/11/2019  EXAMINATION: ONE XRAY VIEW OF THE CHEST 5/11/2019 6:28 am COMPARISON: 10 May 2019 HISTORY: ORDERING SYSTEM PROVIDED HISTORY: Respiratory failure TECHNOLOGIST PROVIDED HISTORY: Respiratory failure Ordering Physician Provided Reason for Exam: Respiratory failure Acuity: Unknown Type of Exam: Unknown FINDINGS: AP portable view of the chest time stamped at 603 hours demonstrates findings of generalized COPD. Overlying cardiac monitoring electrodes are present. An intestinal tube extends below the fundus of the stomach, tip not included. Right central line terminates in the distal superior vena cava. Bipolar pacemaker enters from the left with intact leads in appropriate positions. Heart size is stable, top-normal.  Lung fields are hyperinflated suggestive of COPD. Interstitial markings are coarsened, similar to that noted in 2015 likely chronic interstitial change.   There is improved aeration TECHNOLOGIST PROVIDED HISTORY: dyspnea Ordering Physician Provided Reason for Exam: dyspnea Acuity: Acute Type of Exam: Subsequent/Follow-up Additional signs and symptoms: dyspnea FINDINGS: A right IJ catheter is seen with its tip terminating at the superior cavoatrial junction. The left chest wall pacemaker and leads are stable. The cardiomediastinal silhouette is stable. There is interval increased opacity at the right lung base, may be related to atelectasis versus pneumonia. There is small atelectasis and mild pleural effusion at the left lung base. There is no pneumothorax. There is no acute osseous abnormality. Interval increased opacity at the right lung base, may be related to atelectasis versus pneumonia. Small atelectasis and mild pleural effusion at the left lung, new since the prior study. Xr Chest Standard (2 Vw)    Result Date: 4/29/2019  EXAMINATION: TWO VIEWS OF THE CHEST 4/29/2019 8:04 pm COMPARISON: 04/27/2019 HISTORY: ORDERING SYSTEM PROVIDED HISTORY: Follow-up lung infiltrate TECHNOLOGIST PROVIDED HISTORY: Follow-up lung infiltrate Ordering Physician Provided Reason for Exam: Follow-up infiltrate. Pt unable to lean forward - unable to get proper sponge behind pt for lateral. Pt unable to raise left arm at all for lateral. Best possible lateral obtained. Acuity: Unknown Type of Exam: Unknown Additional signs and symptoms: Follow-up infiltrate. Pt unable to lean forward - unable to get proper sponge behind pt for lateral. Pt unable to raise left arm at all for lateral. Best possible lateral obtained. FINDINGS: Left chest cardiac pacemaker device is in place. Right IJ central venous catheter in place with distal tip at the cavoatrial junction. Heart size is within normal limits. No vascular congestion. There are small to moderate pleural effusions.   There are interstitial opacities in the upper lungs, which could be related to scarring and/or developing infiltrate, slightly improved in appearance when compared to 04/27/2019. No evidence of pneumothorax. 1.  Small to moderate bilateral pleural effusions, better visualized on lateral view. 2.  Upper lobe interstitial opacities, slightly improved when compared to the previous study, possibly related to underlying fibrotic change or residual infiltrate. Xr Knee Left (1-2 Views)    Result Date: 5/8/2019  EXAMINATION: 2 XRAY VIEWS OF THE LEFT KNEE 5/7/2019 11:19 am COMPARISON: Left knee radiographs 03/30/2019. HISTORY: ORDERING SYSTEM PROVIDED HISTORY: fracture TECHNOLOGIST PROVIDED HISTORY: fracture Ordering Physician Provided Reason for Exam: left knee/distal femur pain Acuity: Acute Type of Exam: Initial FINDINGS: Bones are osteopenic. No suprapatellar joint effusion. There is a impacted, transverse fracture of the distal femoral metadiaphysis. Increased sclerosis along the fracture line suggests interval healing. Fracture fragments are in unchanged, near anatomic alignment. No acute dislocation. No new fracture is seen. Impacted, transverse fracture of the distal femoral metadiaphysis is in unchanged alignment and demonstrates interval healing. Xr Abdomen (kub) (single Ap View)    Result Date: 5/10/2019  EXAMINATION: ONE SUPINE XRAY VIEW(S) OF THE ABDOMEN 5/10/2019 9:14 am COMPARISON: Small-bowel series 05/09/2019 HISTORY: ORDERING SYSTEM PROVIDED HISTORY: Small bowel obstruction ([address] TECHNOLOGIST PROVIDED HISTORY: TO ACCOMPANY SMALL BOWEL EXAM SMALL BOWEL OBSTRUCTION Ordering Physician Provided Reason for Exam: SMALL BOWEL FOLLOW THRU - 20 HOUR DELAYED FILM FINDINGS: Significant interval decreased amount of retained contrast in the stomach compared to last image from earlier small bowel series which likely relates to suctioning of the contrast.  Majority of previously seen contrast within the pelvis likely in the rectum is no longer visualized and has likely passed through.   Residual contrast remains within the colon and small bowel. NG tube is in place with side hole in the region of the GE junction. Partially visualized mild left pleural effusion associated atelectasis. 1. Interval presumed suctioning of contrast from the stomach which now appears relatively empty. Continued progression of contrast through the small and large bowel as described. 2. NG tube side hole at the level of the GE junction, consider advancement of 2-3 cm. Xr Abdomen (kub) (single Ap View)    Result Date: 5/8/2019  EXAMINATION: SINGLE SUPINE XRAY VIEW(S) OF THE ABDOMEN 5/8/2019 8:59 am COMPARISON: CT abdomen from 05/05/2019, and KUB from 04/19/2019 HISTORY: ORDERING SYSTEM PROVIDED HISTORY: recheck obstruction TECHNOLOGIST PROVIDED HISTORY: recheck obstruction Ordering Physician Provided Reason for Exam: recheck obstruction Acuity: Unknown Type of Exam: Unknown FINDINGS: Again noted cholecystostomy tube, electrode leads from a pacemaker overlying the heart, and overlying electrode leads/tubing. NG tube no longer demonstrated. Gas-filled bowel loops without marked dilatation; cecum measures 8 cm, slightly increased as compared to the previous study. No obvious free air. No mass or organomegaly. Bones unchanged. Retrocardiac opacity, hyperinflated lungs and probable pleural effusions. NG tube removed. Gas-filled bowel more suggestive ileus; no clear cut obstruction. Cecum measures 8 cm. Cholecystostomy tube in unchanged position. Additional unchanged findings, as above.  RECOMMENDATION: Continued clinical correlation and follow-up     Xr Abdomen (kub) (single Ap View)    Result Date: 4/19/2019  EXAMINATION: SINGLE SUPINE XRAY VIEW(S) OF THE ABDOMEN 4/19/2019 9:48 am COMPARISON: April 14, 2019 HISTORY: ORDERING SYSTEM PROVIDED HISTORY: pain TECHNOLOGIST PROVIDED HISTORY: pain Ordering Physician Provided Reason for Exam: Abdominal pain and distentsion Acuity: Acute Type of Exam: Initial Additional signs and symptoms: Abdominal pain and Multiplanar reformatted images are provided for review. Dose modulation, iterative reconstruction, and/or weight based adjustment of the mA/kV was utilized to reduce the radiation dose to as low as reasonably achievable. COMPARISON: April 14, 2019 HISTORY: ORDERING SYSTEM PROVIDED HISTORY: Check for abscess/fluid collection around ashley tube site. TECHNOLOGIST PROVIDED HISTORY: Ordering Physician Provided Reason for Exam: Patient c/o abd pain around her ashley site and drainage tube. FINDINGS: Evaluation is limited by motion. Lower Chest: Moderate bilateral pleural effusions. Organs: Multiple liver hypodensities measuring up to 4 mm are incompletely characterized but in the absence of risk factors likely represent cysts requiring no specific follow-up. Cholecystostomy tube is in place. No adjacent focal drainable fluid collection. No pancreatic lesion. No splenomegaly. No adrenal lesion. No hydronephrosis. No renal lesion. GI/Bowel: Stomach is markedly distended. Swirled appearance of the mesentery is seen within the mid to lower abdomen with adjacent thickened loops of small bowel. Peritoneum/Retroperitoneum: Atherosclerotic calcification of the abdominal aorta without aneurysmal dilatation. No adenopathy. Bones/Soft Tissues: No acute soft tissue abnormality. Diffuse osteopenia. Lumbar spine degenerative changes. Bowel obstruction which is suspected to be caused by an internal hernia. Cholecystostomy tube is in place. No surrounding fluid collection. Nm Gi Blood Loss    Result Date: 5/3/2019  EXAMINATION: NUCLEAR MEDICINE GASTRIC BLEEDING STUDY 5/3/2019 TECHNIQUE: Following the intravenous injection of 23.8 mCi of 99 mTc-labeled RBC's, a flow study and standard images of the abdomen was obtained over a total period of 60 minutes COMPARISON: None.  HISTORY: ORDERING SYSTEM PROVIDED HISTORY: GI BLEEDING TECHNOLOGIST PROVIDED HISTORY: Ordering Physician Provided Reason for Exam: GI bleeding Acuity: Unknown Type of Exam: Unknown FINDINGS: Activity is seen within the blood pool, including the great vessels, heart, liver, and spleen. There was no abnormal accumulation of radioactivity in the abdomen to suggest active bleeding during study acquisition. No evidence of active GI bleeding during acquisition. RECOMMENDATIONS: If the patient shows hemodynamic signs of an active bleed in the next 20 hours, additional images can be acquired. Kate Hogan Device Plmt/replace/removal    Result Date: 4/30/2019  PROCEDURE: ULTRASOUND GUIDED VASCULAR ACCESS. FLUOROSCOPY GUIDED PICC PLACEMENT 4/30/2019. HISTORY: ORDERING SYSTEM PROVIDED HISTORY: TPN TECHNOLOGIST PROVIDED HISTORY: TPN Lumen?->Double Lumen SEDATION: None FLUOROSCOPY DOSE AND TYPE OR TIME AND EXPOSURES: 7 seconds; D  TECHNIQUE: This procedure was performed by Dr. Kwasi Ravi. Informed consent was obtained after a detailed explanation of the procedure including risks, benefits, and alternatives. Universal protocol was observed. The right arm was prepped and draped in sterile fashion using maximum sterile barrier technique. Local anesthesia was achieved with lidocaine. A micropuncture needle was used to access the right basilic vein using ultrasound guidance. An ultrasound image demonstrating patency of the vein with needle tip located within it. An image was obtained and stored in PACs. A 0.018 guidewire was used to place a peel-a-way sheath and a 5 Chinese dual-lumen PICC was advanced with fluoroscopic guidance with the tip at the cavo-atrial junction. The catheter flushed easily and there was a good blood return. The catheter was secured to the skin. The patient tolerated the procedure well and there were no immediate complications. FINDINGS: Fluoroscopic image demonstrates the tip of the catheter at the cavo-atrial junction.      Successful ultrasound and fluoroscopy guided PICC placement     Us Gallbladder Ruq    Result Date: 4/19/2019  EXAMINATION: RIGHT UPPER QUADRANT ULTRASOUND 4/19/2019 10:48 am COMPARISON: CT abdomen and pelvis 4/14/2018 HISTORY: ORDERING SYSTEM PROVIDED HISTORY: ABDOMINAL PAIN FINDINGS: Pancreas is not well visualized. No liver lesion. Gallbladder wall thickening. Gallbladder sludge. Cholelithiasis. Pericholecystic fluid. Common bile duct measures at the upper limits of normal at 7 mm. Findings suggesting acute cholecystitis. Xr Chest Portable    Result Date: 5/10/2019  EXAMINATION: ONE XRAY VIEW OF THE CHEST 5/10/2019 1:28 am COMPARISON: May 2, 2019. HISTORY: ORDERING SYSTEM PROVIDED HISTORY: low o2 sat TECHNOLOGIST PROVIDED HISTORY: low o2 sat Ordering Physician Provided Reason for Exam: Low o2 sat Acuity: Acute Type of Exam: Initial FINDINGS: Hyperexpanded right lung volume. Left greater than right basilar heterogeneous opacities with left basilar consolidation. Small bilateral pleural effusions. Stable cardiomediastinal silhouette and great vessels. Enteric contrast in the stomach and distal esophagus. Enteric tube courses into the stomach but tip is not definitely seen due to contrast in the stomach. Stable left pectoral cardiac pacer device. Stable right PICC. Left greater than right basilar heterogeneous opacities with left basilar consolidation. Differential considerations include atelectasis and an infectious/inflammatory process. Small bilateral pleural effusions. Enteric contrast in the stomach and distal esophagus. Enteric tube courses into the stomach but tip is not definitely seen due to contrast in the stomach.      Xr Chest Portable    Result Date: 4/27/2019  EXAMINATION: SINGLE XRAY VIEW OF THE CHEST 4/27/2019 8:47 am COMPARISON: 04/26/2019 HISTORY: ORDERING SYSTEM PROVIDED HISTORY: Assess for pulm edema vs PNA TECHNOLOGIST PROVIDED HISTORY: Assess for pulm edema vs PNA Ordering Physician Provided Reason for Exam: cough, congestion Acuity: Acute Type of Exam: Initial FINDINGS: Right IJ central venous catheter is in place tip in the SVC right atrial junction. Pacer wires are in place. The heart and mediastinal structures are stable. Pleural effusions are present with bibasilar atelectasis. Bilateral lung infiltrates are present in the upper lung fields. Persistent pleural effusions with bibasilar atelectasis and bilateral lung infiltrates with areas of consolidation developing in the upper lobes. Xr Chest Portable    Result Date: 4/26/2019  EXAMINATION: SINGLE XRAY VIEW OF THE CHEST 4/26/2019 6:51 am COMPARISON: April 24, 2019 HISTORY: ORDERING SYSTEM PROVIDED HISTORY: Pleural effusions TECHNOLOGIST PROVIDED HISTORY: Pleural effusions Ordering Physician Provided Reason for Exam: pleural effusion Acuity: Acute Type of Exam: Initial FINDINGS: Right IJ line in good position. Pacer device is stable. Cardiac leads overlie the chest.  Stable cardiomediastinal contours with calcified aortic arch. Left effusion and associated airspace disease, slightly decreased. Chronic blunting of right costophrenic sulcus may represent scarring or chronic effusion. Increased reticular markings right upper chest and left upper chest possibly interstitial edema or interstitial pneumonia. No new airspace consolidation. Increasing reticular markings upper chest bilaterally right greater than left possibly due to edema or interstitial pneumonitis. Improving left effusion with associated retrocardiac airspace disease. Pleural-parenchymal scarring or effusion in the right lung base, stable. Xr Chest Portable    Result Date: 4/24/2019  EXAMINATION: SINGLE XRAY VIEW OF THE CHEST 4/24/2019 6:05 am COMPARISON: Chest radiograph dated 04/22/2019 HISTORY: ORDERING SYSTEM PROVIDED HISTORY: Acute respiratory failure, pleural effusion TECHNOLOGIST PROVIDED HISTORY: Acute respiratory failure, pleural effusion Ordering Physician Provided Reason for Exam: pleural effusion, respiratory failure.  Acuity: Acute Type of Exam: Initial FINDINGS: Heart size is normal.  Dual-chamber pacemaker placed via left subclavian approach. Right internal jugular central line has tip in distal superior vena cava. Interval removal of nasogastric tube. No interval change of infiltrate and atelectasis at the left lung base. Stable mild infiltrate in the left upper lobe. Mild interval improvement of infiltrate at the right lung base. Stable small bilateral pleural effusions, left larger than right. Mild CHF, stable. 1.  No interval change of infiltrate and atelectasis at the left lung base. Stable mild infiltrate in the left upper lobe. 2.  Mild interval improvement of infiltrate at the right lung base. 3.  Stable small bilateral pleural effusions, left larger than right. 4.  Mild CHF, stable. Xr Chest Portable    Result Date: 4/22/2019  EXAMINATION: SINGLE XRAY VIEW OF THE CHEST 4/22/2019 6:44 am COMPARISON: April 21, 2019. HISTORY: ORDERING SYSTEM PROVIDED HISTORY: Hypoxia and CHF TECHNOLOGIST PROVIDED HISTORY: Hypoxia and CHF Ordering Physician Provided Reason for Exam: hx of hypoxia/CHF Acuity: Acute Type of Exam: Initial FINDINGS: Right IJ central venous catheter appears in unchanged position. Nasogastric tube courses below the diaphragm, with tip not imaged. Left chest wall pacemaker device projects in unchanged position. Cardiac and mediastinal contours are enlarged but unchanged. Unchanged bilateral pleural effusions and bibasilar pulmonary opacities. Unchanged findings suggestive of congestive heart failure. No evidence of pneumothorax. No new osseous abnormalities. 1. No significant change in findings suggestive of congestive heart failure. 2. Unchanged bilateral pleural effusions and bibasilar pulmonary consolidations. RECOMMENDATION: Radiographic follow-up to complete resolution.      Xr Chest Portable    Result Date: 4/21/2019  EXAMINATION: SINGLE XRAY VIEW OF THE CHEST 4/21/2019 3:08 pm COMPARISON: April 19, 2019 HISTORY: ORDERING SYSTEM PROVIDED HISTORY: hypoxia TECHNOLOGIST PROVIDED HISTORY: hypoxia Ordering Physician Provided Reason for Exam: hypoxia Acuity: Unknown Type of Exam: Unknown FINDINGS: Enteric tube in the stomach. ET tube is been removed. Right IJ catheter terminates in the atrial caval junction. Bipolar pacer on the left unchanged. Heart and mediastinum unremarkable. Moderate edema unchanged. Small effusions, left greater than right. Bony thorax intact. Status post extubation. Life support apparatus otherwise stable. Moderate edema and effusions unchanged. Xr Chest Portable    Result Date: 4/19/2019  EXAMINATION: SINGLE XRAY VIEW OF THE CHEST 4/19/2019 5:51 am COMPARISON: April 18, 2019 HISTORY: ORDERING SYSTEM PROVIDED HISTORY: ETT placement TECHNOLOGIST PROVIDED HISTORY: ETT placement Ordering Physician Provided Reason for Exam: Vent Pt check ETT placement Acuity: Acute Type of Exam: Subsequent/Follow-up Additional signs and symptoms: Vent Pt check ETT placement FINDINGS: Tubes and lines are unchanged. Persistent bibasilar lung opacities and pleural effusions. Mild pulmonary edema. No significant interval change. Xr Chest Portable    Result Date: 4/18/2019  EXAMINATION: SINGLE XRAY VIEW OF THE CHEST 4/18/2019 6:21 am COMPARISON: April 17, 2019 HISTORY: ORDERING SYSTEM PROVIDED HISTORY: ETT placement TECHNOLOGIST PROVIDED HISTORY: ETT placement Ordering Physician Provided Reason for Exam: on vent Acuity: Acute Type of Exam: Initial FINDINGS: Right IJ line and NG tube are stable. Interval advancement of ET tube terminating 3.1 cm from ron. Implanted cardiac device is present. Left-sided effusion and associated airspace disease is stable. Hazy right basilar opacity possibly edema or atelectasis. No pneumothorax. Increased opacity left upper chest possibly focal area of atelectasis, new from prior. Advanced ETT from prior exam, now 3.1 cm from ron.  Increased opacity left upper chest suspicious for atelectasis. Additional supporting devices are stable. Xr Chest Portable    Result Date: 4/17/2019  EXAMINATION: SINGLE XRAY VIEW OF THE CHEST 4/17/2019 7:15 am COMPARISON: 16 April 2019 HISTORY: ORDERING SYSTEM PROVIDED HISTORY: ETT placement TECHNOLOGIST PROVIDED HISTORY: ETT placement Ordering Physician Provided Reason for Exam: on vent Acuity: Acute Type of Exam: Initial FINDINGS: AP portable view of the chest time stamped at 613 hours demonstrates overlying cardiac monitoring electrodes. A right internal jugular catheter terminates in the superior vena cava. Endotracheal tube is somewhat high riding terminating 6.4 cm above the ron. Intestinal tube extends beyond the body of the stomach, tip not included on the exam.  Bipolar pacemaker enters from the left with intact leads in appropriate positions. Heart size is normal.  Left pleural effusion is noted there is continued volume loss in the left hemithorax with stable mild vascular congestion, perihilar opacities, and faint opacity in the right mid and lower lung field. Underlying COPD is noted. Osseous structures are stable. There is little change from prior study. Findings of COPD, mild central vascular congestion, left effusion, and scattered opacities favoring interstitial edema with multifocal pneumonitis not excluded. Tubes and lines as above. However, endotracheal tube is high riding. The findings were sent to the Radiology Results [address] at 7:58 am on 4/17/2019to be communicated to a licensed caregiver. RECOMMENDATION: Suggest advancement of endotracheal tube.      Xr Chest Portable    Result Date: 4/16/2019  EXAMINATION: SINGLE XRAY VIEW OF THE CHEST 4/16/2019 6:54 am COMPARISON: 04/15/2019, 610 hours HISTORY: ORDERING SYSTEM PROVIDED HISTORY: ETT placement TECHNOLOGIST PROVIDED HISTORY: ETT placement Ordering Physician Provided Reason for Exam: on vent Acuity: Acute Type of Exam: Initial 47-year-old female on ventilator; check endotracheal tube placement FINDINGS: Portable AP upright view of the chest. Endotracheal tube distal tip overlying the mid trachea approximately 4.1 cm above the level of the ron. Enteric tube traverses the GE junction with distal tip excluded from the field of view. Left subclavian approach cardiac pacemaker device distal lead tips relatively stable in position. Right internal jugular approach central venous catheter distal tip overlying the high right atrium, stable. Cardiac monitor leads overlie the chest. Atherosclerotic calcification of the thoracic aorta. Slight stable volume loss of the left hemithorax. No pneumothorax. No free air. Dense retrocardiac/left basilar airspace consolidation and small left-sided pleural effusion. Stable mild focal opacity at the right mid lung zone. Underlying COPD. Stable mild pulmonary vascular congestion and left-sided predominant parahilar opacity. Visualized osseous structures remain unchanged. 1. Stable multifocal airspace disease as detailed above with dense retrocardiac/left basilar airspace consolidation and small left-sided pleural effusion. Mild pulmonary vascular congestion. Findings may represent edema or multifocal pneumonia. 2. Underlying COPD. 3. Tubes and line as detailed above. Xr Chest Portable    Result Date: 4/15/2019  EXAMINATION: SINGLE XRAY VIEW OF THE CHEST 4/15/2019 6:47 am COMPARISON: 14 April 2019 HISTORY: ORDERING SYSTEM PROVIDED HISTORY: ETT placement TECHNOLOGIST PROVIDED HISTORY: ETT placement Ordering Physician Provided Reason for Exam: on vent Acuity: Acute Type of Exam: Initial FINDINGS: AP portable view of the chest time stamped at 612 hours demonstrates overlying cardiac monitoring electrodes. Endotracheal tube terminates 4 cm above the ron. Bipolar pacemaker enters from the left with intact leads in appropriate positions.   Intestinal tube extends beyond the fundus of the stomach, tip not included. Right internal jugular catheter terminates at the cavoatrial junction. Heart size is normal.  Aortic arch is calcified. There is interval improvement in vascular congestion with resolution of perihilar opacities. Some bibasilar opacities remain. No extrapleural air is noted. Osseous structures are stable. Interval improvement in vascular congestion and bilateral opacities consistent with resolving pulmonary edema. Tubes and lines as above. Xr Chest Portable    Result Date: 4/14/2019  EXAMINATION: SINGLE XRAY VIEW OF THE CHEST 4/14/2019 7:57 am COMPARISON: Portable chest 04/13/2019. HISTORY: ORDERING SYSTEM PROVIDED HISTORY: Intubation TECHNOLOGIST PROVIDED HISTORY: Intubation Ordering Physician Provided Reason for Exam: intubation Acuity: Acute Type of Exam: Initial Additional signs and symptoms: intubation FINDINGS: Endotracheal tube terminates over the midthoracic trachea. Dual-chamber pacemaker leads appear unchanged in position. Right IJ approach central venous catheter unchanged in position. Heart size not substantially changed. Perihilar and basilar opacities further increased. Left pleural effusion increased in size. Findings may reflect pulmonary edema, progressed from yesterday's exam.  Left pleural effusion increased in size.      Vl Upper Extremity Bilateral Venous Duplex    Result Date: 4/22/2019    [facility]  Vascular Upper Extremities Veins Procedure   Patient Name   Massimo Morrison     Date of Study           04/22/2019                 Sariah Sethi   Date of Birth  1950  Gender                  Female   Age            77 year(s)  Race                       Room Number    2002        Height:                 59.84 inch, 152 cm   Corporate ID # [id number]    Weight:                 102 pounds, 46.3 kg   Patient Acct # [de-identified]   BSA:        1.4 m^2     BMI:       20.03 kg/m^2   MR #           [id number]      Sonographer Roderick Mario   Accession #    [id number]   Interpreting Physician  Da Andrews   Referring                  Referring Physician     Carine Juarez *  Nurse  Practitioner  Procedure Type of Study:   Veins: Upper Extremities Veins, Venous Scan Upper Bilateral.  Indications for Study:Swelling. Patient Status: In Patient. Technical Quality:Limited visualization. Limitation reason:Line placements. Conclusions   Summary   No evidence of superficial or deep venous thrombosis in both upper  extremities. Signature   ----------------------------------------------------------------  Electronically signed by Roderick Mario(Sonographer) on  04/22/2019 11:46 AM  ----------------------------------------------------------------   ----------------------------------------------------------------  Electronically signed by Kiara Oliveira(Interpreting  physician) on 04/22/2019 09:31 PM  ----------------------------------------------------------------  Findings:   Right Impression:                  Left Impression:  Internal jugular vein not          The internal jugular, subclavian,  visualized due to line placement. axillary, brachial, radial, and ulnar                                     veins demonstrate normal  Subclavian, axillary, brachial,    compressibility with phasic Doppler  radial, and ulnar veins            signals. demonstrate normal compressibility  with phasic Doppler signals. Normal compressibility of the cephalic                                     vein. Normal compressibility of the  cephalic vein. Normal compressibility of the basilic                                     vein. Normal compressibility of the  basilic vein. Velocities are measured in cm/s ; Diameters are measured in cm Right UE Vein Measurements 2D Measurements +------------------------------------+----------+---------------+----------+ ! Location                            ! Visualized! Compressibility! Thrombosis! !None      ! +------------------------------------+----------+---------------+----------+ ! Dist SCV                            ! Yes       ! Yes            ! None      ! +------------------------------------+----------+---------------+----------+ ! Prox Axillary                       ! Yes       ! Yes            ! None      ! +------------------------------------+----------+---------------+----------+ ! Dist Axillary                       ! Yes       ! Yes            ! None      ! +------------------------------------+----------+---------------+----------+ ! Prox Brachial                       !Yes       ! Yes            ! None      ! +------------------------------------+----------+---------------+----------+ ! Dist Brachial                       !Yes       ! Yes            ! None      ! +------------------------------------+----------+---------------+----------+ ! Prox Radial                         !Yes       ! Yes            ! None      ! +------------------------------------+----------+---------------+----------+ ! Dist Radial                         !Yes       ! Yes            ! None      ! +------------------------------------+----------+---------------+----------+ ! Prox Ulnar                          ! Yes       ! Yes            ! None      ! +------------------------------------+----------+---------------+----------+ ! Dist Ulnar                          ! Yes       ! Yes            ! None      ! +------------------------------------+----------+---------------+----------+ ! Basilic at UA                       ! Yes       ! Yes            ! None      ! +------------------------------------+----------+---------------+----------+ ! Cephalic at UA                      ! Yes       ! Yes            ! None      ! +------------------------------------+----------+---------------+----------+ ! Gideon at AF                      ! Yes       ! Yes            ! None      ! +------------------------------------+----------+---------------+----------+ Doppler Measurements +-------------------------+-----------------------+------------------------+ ! Location                 ! Signal                 !Reflux                  ! +-------------------------+-----------------------+------------------------+ ! IJV                      ! Phasic                 !                        ! +-------------------------+-----------------------+------------------------+ ! SCV                      ! Phasic                 !                        ! +-------------------------+-----------------------+------------------------+ ! Axillary                 ! Phasic                 !                        ! +-------------------------+-----------------------+------------------------+ ! Brachial                 !Phasic                 !                        ! +-------------------------+-----------------------+------------------------+    Vl Dup Lower Extremity Venous Bilateral    Result Date: 5/7/2019    [facility]  Vascular Lower Extremities DVT Study Procedure   [facility]     Date of Study           05/07/2019                 Sariah Sethi   Date of Birth  1950  Gender                  Female   Age            77 year(s)  Race                       Room Number    2123        Height:                 59.84 inch, 152 cm   Corporate ID # [id number]    Weight:                 102 pounds, 46.3 kg   Patient Acct # [de-identified]   BSA:        1.4 m^2     BMI:      20.03 kg/m^2   MR #           [id number]      Sonographer             Sloane Lai, [facility]   Accession #    [id number]   Interpreting Physician  Antonella Castanon   Referring                  Referring Physician     Kolton Louis MD  Nurse  Practitioner  Procedure Type of Study:   Veins: Lower Extremities DVT Study, Venous Scan Lower Bilateral.  Indications for Study:Pain and swelling. Patient Status: In Patient. Technical Quality:Limited visualization. Limitation reason:patient movement.   Conclusions   Summary   No evidence of superficial or deep venous thrombosis in both lower  extremities. Technically limited visualization. Signature   ----------------------------------------------------------------  Electronically signed by Gardenia Ray RVT(Sonographer) on  2019 01:22 PM  ----------------------------------------------------------------   ----------------------------------------------------------------  Electronically signed by Berenda Apgar Farooq(Interpreting  physician) on 2019 06:45 PM  ----------------------------------------------------------------  Findings:   Right Impression:                    Left Impression:  The common femoral, femoral,         The common femoral, femoral,  popliteal and tibial veins           popliteal and tibial veins  demonstrate normal compressibility   demonstrate normal compressibility  and augmentation. and augmentation. Limited visualization of the calf    Limited visualization of the calf  veins. veins. Normal compressibility of the great  Normal compressibility of the great  saphenous vein. saphenous vein. Normal compressibility of the small  Normal compressibility of the small  saphenous vein. saphenous vein. Velocities are measured in cm/s ; Diameters are measured in cm Right Lower Extremities DVT Study Measurements Right 2D Measurements +------------------------------------+----------+---------------+----------+ ! Location                            ! Visualized! Compressibility! Thrombosis! +------------------------------------+----------+---------------+----------+ ! Common Femoral                      !Yes       ! Yes            ! None      ! +------------------------------------+----------+---------------+----------+ ! Prox Femoral                        !Yes       ! Yes            ! None      ! +------------------------------------+----------+---------------+----------+ ! Mid Femoral !Yes       !Yes            ! None      ! +------------------------------------+----------+---------------+----------+ ! Dist Femoral                        !Yes       ! Yes            ! None      ! +------------------------------------+----------+---------------+----------+ ! Deep Femoral                        !Yes       ! Yes            ! None      ! +------------------------------------+----------+---------------+----------+ ! Popliteal                           !Yes       ! Yes            ! None      ! +------------------------------------+----------+---------------+----------+ ! Sapheno Femoral Junction            ! Yes       ! Yes            ! None      ! +------------------------------------+----------+---------------+----------+ ! PTV                                 ! Partial   !Yes            ! None      ! +------------------------------------+----------+---------------+----------+ ! Peroneal                            !Partial   !Yes            ! None      ! +------------------------------------+----------+---------------+----------+ ! Gastroc                             ! Yes       ! Yes            ! None      ! +------------------------------------+----------+---------------+----------+ ! GSV Thigh                           ! Yes       ! Yes            ! None      ! +------------------------------------+----------+---------------+----------+ ! GSV Knee                            ! Yes       ! Yes            ! None      ! +------------------------------------+----------+---------------+----------+ ! GSV Ankle                           ! Yes       ! Yes            ! None      ! +------------------------------------+----------+---------------+----------+ ! SSV                                 ! Partial   !Yes            ! None      ! +------------------------------------+----------+---------------+----------+ Left Lower Extremities DVT Study Measurements Left 2D Measurements +------------------------------------+----------+---------------+----------+ ! Location                            ! Visualized! Compressibility! Thrombosis! +------------------------------------+----------+---------------+----------+ ! Common Femoral                      !Yes       ! Yes            ! None      ! +------------------------------------+----------+---------------+----------+ ! Prox Femoral                        !Yes       ! Yes            ! None      ! +------------------------------------+----------+---------------+----------+ ! Mid Femoral                         !Yes       ! Yes            ! None      ! +------------------------------------+----------+---------------+----------+ ! Dist Femoral                        !Yes       ! Yes            ! None      ! +------------------------------------+----------+---------------+----------+ ! Deep Femoral                        !Yes       ! Yes            ! None      ! +------------------------------------+----------+---------------+----------+ ! Popliteal                           !Yes       ! Yes            ! None      ! +------------------------------------+----------+---------------+----------+ ! Sapheno Femoral Junction            ! Yes       ! Yes            ! None      ! +------------------------------------+----------+---------------+----------+ ! PTV                                 ! Partial   !Yes            ! None      ! +------------------------------------+----------+---------------+----------+ ! Peroneal                            !Partial   !Yes            ! None      ! +------------------------------------+----------+---------------+----------+ ! Gastroc                             ! Yes       ! Yes            ! None      ! +------------------------------------+----------+---------------+----------+ ! GSV Thigh                           ! Yes       ! Yes            ! None      ! +------------------------------------+----------+---------------+----------+ ! GSV Knee !Yes       !Yes            ! None      ! +------------------------------------+----------+---------------+----------+ ! GSV Ankle                           ! Yes       ! Yes            ! None      ! +------------------------------------+----------+---------------+----------+ ! SSV                                 ! Partial   !Yes            ! None      ! +------------------------------------+----------+---------------+----------+    Ir Cholecystostomy Percutaneous Complete    Result Date: 4/22/2019  PROCEDURE: ULTRASOUND and fluoroscopic GUIDED CHOLECYSTOSTOMY TUBE PLACEMENT April 22, 2019 HISTORY: ORDERING SYSTEM PROVIDED HISTORY: acute cholecystitis TECHNOLOGIST PROVIDED HISTORY: acute cholecystitis SEDATION: 75 mcg IV fentanyl for pain TECHNIQUE: Informed consent was obtained after a detailed explanation of the procedure including risks, benefits, and alternatives. Universal protocol was followed. A suitable skin site was prepped and draped in sterile fashion following ultrasound localization. An 18 gauge needle was advanced under ultrasound guidance into the gallbladder via transhepatic route and a 0.035 guidewire was used to place a 8 [place] cholecystostomy tube under fluoroscopic guidance. The catheter was sutured to the skin and the patient tolerated the procedure well. Thick bilious material was sent for laboratory analysis. The catheter was attached to gravity drainage. FINDINGS: Ultrasound image demonstrates distended gallbladder. Bilious fluid was sent for culture. Successful placement of transhepatic 8 [place] percutaneous cholecystostomy tube. Nm Hepatobiliary Scan W Ejection Fraction    Result Date: 4/20/2019  EXAMINATION: NUCLEAR MEDICINE HEPATOBILIARY SCINTIGRAPHY (HIDA SCAN). 4/20/2019 2:15 pm TECHNIQUE: Approximately 5.6 [id number] Mebrofenin (Choletec) was administered IV. Then, dynamic images of the abdomen were obtained in the anterior projection for 60 mins.   A right lateral view was also obtained at 60 mins. Delayed images up to 4 hours were obtained. COMPARISON: Ultrasound 04/19/2019 HISTORY: ORDERING SYSTEM PROVIDED HISTORY: CHOLECYSTITIS TECHNOLOGIST PROVIDED HISTORY: Ordering Physician Provided Reason for Exam: Cholecystitis Acuity: Unknown Type of Exam: Unknown FINDINGS: Prompt, homogenous uptake by the liver is noted with normal appearance of radiotracer excretion into the biliary system. Clearance of bloodpool activity appears appropriate. Normal small bowel activity is seen. Prominent activity within the common bile duct. The gallbladder is not visualized by the end of the exam.     Absent filling of the gallbladder consistent with acute cholecystitis. Fl Small Bowel Follow Through Only    Result Date: 5/10/2019  EXAMINATION: SMALL BOWEL FOLLOW THROUGH SERIES 5/9/2019 TECHNIQUE: Small bowel follow through series was performed with overhead images. FLUOROSCOPY DOSE AND TYPE OR TIME AND EXPOSURES: No fluoroscopic images obtained. COMPARISON: CT abdomen pelvis 05/05/2019 HISTORY: ORDERING SYSTEM PROVIDED HISTORY: internal hernia TECHNOLOGIST PROVIDED HISTORY: Water soluble contrast only. Study to be done ONLY if NGT is placed by IR internal hernia FINDINGS:  image demonstrates NG tube overlying the left side of the abdomen within the stomach. Surgical drain overlies the right side of the abdomen. There is a nonspecific bowel gas pattern with mild dilated loops of bowel in lower abdomen. Initial images demonstrate filling of the stomach. At 1 hour and 45 minutes no significant contrast is noted in the small bowel. The 4-1/2 hour image demonstrates contrast within loops of bowel within the mid and lower abdomen and pelvis. There appears to be contrast extending to the colon in the right side of the abdomen. Contrast is noted within the pelvis which may be within the bladder or rectum.      Significant delay in emptying of the stomach with persistent contrast noted at the 6 hour concha. There is contrast extending into the bowel which appears to be in the colon. Subtle findings are limited. Consider follow-up radiograph of the abdomen in 6-8 hours. Ir Place Ng Tube By Dr Sundeep Fall    Result Date: 5/9/2019  PROCEDURE: XR PLACE NASOGASTRIC TUBE PHYS 5/9/2019 HISTORY: ORDERING SYSTEM PROVIDED HISTORY: ileus TECHNOLOGIST PROVIDED HISTORY: ileus Ordering Physician Provided Reason for Exam: ILEUS Acuity: Unknown Type of Exam: Unknown CONTRAST: None SEDATION: None FLUOROSCOPY DOSE AND TYPE OR TIME AND EXPOSURES: 21 seconds; D AP 46 DESCRIPTION OF PROCEDURE AND FINDINGS: This procedure was performed by Dr. Gregorio Mcneal. Under intermittent fluoroscopic guidance a 12 [place] nasogastric tube was placed through the right nostril and directed under fluoroscopy into the stomach. A small amount of air was injected confirming the tip location in the stomach. Partially visualized cholecystostomy tube and pacer wires. Tube was secured in place to the patient's nose with an adhesive dressing. 12 [place] nasogastric tube placed successfully with its tip in the stomach. Physical Examination:        Physical Exam   Constitutional: She is oriented to person, place, and time. No distress. Sedated, on pressor x 1, does not appear in acute distress   HENT:   Head: Normocephalic and atraumatic. Eyes: Pupils are equal, round, and reactive to light. Conjunctivae are normal. Right eye exhibits no discharge. Neck: Neck supple. Cardiovascular: Regular rhythm, normal heart sounds and intact distal pulses. Pulmonary/Chest: Effort normal and breath sounds normal. No respiratory distress. She has no wheezes. She has no rales. She exhibits no tenderness. Abdominal: Soft. Bowel sounds are normal. There is no tenderness. Musculoskeletal: She exhibits edema (B/L hands, pitting, +2). She exhibits no tenderness. Neurological: She is alert and oriented to person, place, and time. Skin: Skin is warm and dry. Capillary refill takes less than 2 seconds. Vitals reviewed.     Vitals:    05/17/19 0800 05/17/19 0815 05/17/19 0830 05/17/19 0845   BP: (!) 118/34 (!) 83/42 (!) 92/53 (!) 81/54   Pulse: 107 110 109 127   Resp: 19 20 24 16   Temp:       TempSrc:       SpO2: 100% 99% 99% 99%   Weight:       Height:           Assessment:        Primary Problem  GI bleed    Active Hospital Problems    Diagnosis Date Noted    Small bowel obstruction ([facility] [K56.609]     Anxiety disorder [F41.9]     Noncompliance [Z91.19]     Anemia [D64.9]     GI bleed [K92.2] 05/03/2019    Hemorrhage of rectum and anus [K62.5]     Centrilobular emphysema ([facility] [address] [J43.2] 04/30/2019    CRP elevated [R79.82]     Elevated procalcitonin [R79.89]     Bandemia [D72.825]     Cholecystitis [K81.9]     Abdominal distention [R14.0]     Elevated CEA [R97.0]     Elevated CA 19-9 level [R97.8]     Urinary retention [R33.9]     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]     Septic shock (HCC) [A41.9, R65.21]     Leukemoid reaction [D72.823]     Aspiration pneumonia of right lower lobe due to vomit ([facility] [address] [J69.0]     MRSA carrier [Z22.322]     Sepsis due to urinary tract infection ([facility] [address] [A41.9, N39.0] 04/11/2019    Cystitis [N30.90] 04/11/2019       Plan:        SBO, chronic cholecystitis s/p ex-lap, cholecystectomy, R colectomy w/ ileocolic anastomosis, enterolysis, POD#2  - Pulm/CC following   - on a ventilator   - Levophed currently, titrated from 2 to 8-10 this AM    -will add Albumin 50g q8h   -Lasix 20mg IV s/p each Albumin and if SBP <100   - LR @40cc/hr   -Changed sedation, thought to contribute to the hypotensive episodes   - Solu-Cortef 100mg q6h  - General Surgery following   -provided with an LR bolus  - ID Following   - Merrem day 8    - Vanco day 5   - procal 0.83 (1.12)  - H/H Stable, Transfuse if Hgb<7.0   -will transfuse 2U pRBC's    Hypomagnesemia, resolving  - Mg 2.0, MgSS    DVT PPx  - Lovenox 30mg qd Dispo  - PT/OT  - SW - discussing [facility] for rehab, Neha Goff MD  5/17/2019  9:07 AM   Attending Physician Statement  Patient seen and examined  I have discussed the care of the patient, including pertinent history and exam findings,  with the resident. I have reviewed the key elements of all parts of the encounter with the resident. I agree with the assessment, plan and orders as documented by the resident. cc time > 30 min    Bobby Kapoor

## 2019-05-17 NOTE — PROGRESS NOTES
It appears the appeal for [facility] was denied. [facility] is following pt.

## 2019-05-17 NOTE — PLAN OF CARE
Problem: Risk for Impaired Skin Integrity  Goal: Tissue integrity - skin and mucous membranes  Description  Structural intactness and normal physiological function of skin and  mucous membranes. 5/17/2019 1914 by Tiffany Lombardo RN  Outcome: Ongoing  Pt turned and repositioned every 2 hours and as needed. Pt educated on need. Protective barrier applied and no new skin breakdown noted. 5/17/2019 0648 by Oni Oneil RN  Outcome: Ongoing  Note:   Patient turned and repositioned every 2 hours and as needed for comfort. Skin remains dry and intact. No new skin breakdown noted. Problem: Falls - Risk of:  Goal: Will remain free from falls  Description  Will remain free from falls  5/17/2019 1914 by Tiffany Lombardo RN  Outcome: Ongoing  Pt remains free of falls and educated on fall prevention. 5/17/2019 0648 by Oni Oneil RN  Outcome: Ongoing  Goal: Absence of physical injury  Description  Absence of physical injury  Outcome: Met This Shift     Problem: Infection, Septic Shock:  Goal: Will show no infection signs and symptoms  Description  Will show no infection signs and symptoms  5/17/2019 1914 by Tiffany Lombardo RN  Outcome: Ongoing  5/17/2019 0648 by Oni Oneil RN  Outcome: Ongoing     Problem: Tissue Perfusion, Altered:  Goal: Circulatory function within specified parameters  Description  Circulatory function within specified parameters  Outcome: Met This Shift     Problem: Pain:  Goal: Pain level will decrease  Description  Pain level will decrease  5/17/2019 1914 by Tiffany Lombardo RN  Outcome: Ongoing  Pt c/o of pain several times throughout shift. Pain medicine given. 5/17/2019 0648 by Oni Oneil RN  Outcome: Ongoing  Note:   Pain assessed at regular intervals. Medications administered as requested for comfort. Pain remains at a tolerable level.       Problem: Nutrition  Goal: Optimal nutrition therapy  Description  5/17/2019 1914 by Sterling Menjivar Osiel Jones RN  Outcome: Ongoing  Surgeon gave the ok for patient to have TPN started tonight. 5/17/2019 1430 by Cushing A Clunk, RD, LD  Outcome: Ongoing     Problem: Restraint Use - Nonviolent/Non-Self-Destructive Behavior:  Goal: Absence of restraint indications  Description  Absence of restraint indications    5/17/2019 1914 by Hellen Cash RN  Outcome: Not met this shift  Pt. Continues to reach for ET tube with every 2 hour restraint release. 5/17/2019 0648 by Raulito Patterson RN  Outcome: Not Met This Shift  Note:   Patient attempts to pull at lines/tubes when restraints are released. Restraints remain in place for patient safety. Goal: Absence of restraint-related injury  Description  Absence of restraint-related injury    5/17/2019 1914 by Hellen Cash RN  Outcome: Met This Shift  Absence of physical injury met this shift. 5/17/2019 0648 by Raulito Patterson RN  Outcome: Ongoing  Note:   Restraints released and ROM performed every 2 hours and PRN. No injury noted.

## 2019-05-17 NOTE — PROGRESS NOTES
Infectious disease Consult Note      Patient: Juliet Avalos  : 1950  Acct#:  [id number]     Date:  2019    Assessment:   Leukocytosis improving . Aspiration pneumonia . Shock  . Anemia/GI bleed followed by GI  SBO   Cholecystitis status post cholecystectomy tube  grew Candida non-albicans and one colony of ESBL Klebsiella on culture . finished ABXS course      Ecoli Emphysematous cystitis initially treated     Aspiration pneumonia of right lower lobe initially treated    Yeast growth on sputum culture suspect contamination     MRSA carrier    Urinary retention     Anemia      PCN allergy               Recommendations:     Continue IV meropenem and IV Vancomycin . Follow blood cultures   Sputum culture   Follow CBC and renal function  . Continue supportive care . Subjective:       History of Present Illness  Patient is a 77 y.o.  female admitted with Sepsis due to urinary tract infection   who is seen in consult for the same . She presented w dysuria and lower abd pain for around a week associated with vomiting ,was found hypotensive with leukocytosis . CT suggested emphysematous cystitis,possible gastric outlet obstruction. CXR showed a right lower infiltrate. High CA-19-9,CEA  Allergy to [facility] w SOB   Urine culture  grew ESCHERICHIA COLI resistant to Ampicillin ,sensitive to all other tested ABXS . Blood cultures negative . Mycoplasma IGM 0.97   YEAST MODERATE GROWTH on sputum culture   S/P EGD   with reported esophagitis. GB US Findings suggesting acute cholecystitis. HIIDA showed absent gallbladder filling. She was extubated on   Status post cholecystectomy tube  by interventional radiology,  cultures on  grew Candida non-albicans and one colony of ESBL Klebsiella. PICC line was placed   Tagged RBC scan  was negative . CT abdomen  showed no fluid collection ,Bowel obstruction which is suspected to be caused by an internal hernia.   NG tube was placed under fluoroscopy 5/9. She had a small bowel follow-through 5/9, developed respiratory failure with hypoxia, tachycardia and tachypnea was transferred to ICU placed on BiPAP, blood pressure on the low side required Levophed,WBC up to 28.5    code status was changed to [facility] on 5/13. Interval history :  S/p right colectomy with ileocolic anastomosis,open cholecystectomy and excision of small bowel diverticulum 5/15 . She is still intubated ,WBC down to 18  Still on  Levophed . No fever   Lactic acid normal ,procalcitonin level 0.83   CXR reviewed still show  left effusion and left basilar opacity. Past Medical History:   Diagnosis Date    Abnormal computed tomography of cervical spine     sclerotic bone appearance     CVA (cerebral vascular accident) ([address]     left  side weakness    GERD (gastroesophageal reflux disease)     Hypertension     Paraproteinemia     Weight loss       Past Surgical History:   Procedure Laterality Date    CHOLECYSTECTOMY N/A 5/15/2019    CHOLECYSTECTOMY performed by Maia Cardona DO at [facility]  4/14/2019         LAPAROSCOPY N/A 5/15/2019    LAPAROSCOPY EXPLORATORY CONVERTED TO EXPLORATORY LAPAROTOMY/ RIGHT COLON RESECTION AND ANASTAMOSIS/ OPEN CHOLECYSTECTOMY/ EXTENSIVE LYSIS OF ADHESIONS performed by Maia Cardona DO at [facility] 10  07/2011    Pacemaker is Medtronic Revo (compatible). Leads placed in 1995 are NOT MRI compatible. Placed at [address] [place] [address]. V's per Dr. Rivas Augustin can not have an MRI.  UPPER GASTROINTESTINAL ENDOSCOPY N/A 4/16/2019    EGD ESOPHAGOGASTRODUODENOSCOPY @ BEDSIDE  ICU 2002 performed by Arnold Mora MD at [address]          Admission Meds  No current facility-administered medications on file prior to encounter.       Current Outpatient Medications on File Prior to Encounter   Medication Sig Dispense Refill    oxyCODONE-acetaminophen (PERCOCET) 5-325 MG per tablet Take 1 tablet by mouth every 6 Vented patient. Contrast given via nurse through NG tube. Acuity: Unknown Type of Exam: Unknown Relevant Medical/Surgical History: Hx - Sepsis due to urinary tract infection. FINDINGS: Lower Chest: New moderate layering bilateral pleural effusions with bilateral lower lobe atelectasis. Organs: Limited evaluation due lack of intravenous contrast.  Cholelithiasis redemonstrated. No gallbladder wall thickening or biliary ductal dilatation. Scattered tiny hypodense lesions in the liver are too small to characterize but statistically represent benign cysts or hemangiomas and appear unchanged. The pancreas, spleen, adrenal glands, and kidneys are unremarkable. There is no hydronephrosis or urinary tract calculus. GI/Bowel: The stomach is distended. Enteric tube is in place. No contrast is seen distal to the pylorus and there is contrast reflux into the distal esophagus. There is no evidence of bowel obstruction. The appendix is not definitely visualized. No focal pericecal inflammatory changes are evident. Pelvis: The urinary bladder is decompressed by Tran catheter. No pelvic mass is seen. Peritoneum/Retroperitoneum: Small amount of free fluid in the pelvic cavity. No free air or focal fluid collection. No abnormal lymph node. Normal abdominal aortic caliber. Moderate calcific atherosclerosis. Bones/Soft Tissues: No acute osseous abnormality. Diffuse anasarca. Moderate degenerative changes in the lumbar spine. 1. Distended, contrast filled stomach with reflux of contrast into the esophagus. No enteric contrast is seen distal to the pylorus suggesting delayed gastric emptying in the setting of ileus. 2. New moderate layering bilateral pleural effusions with bilateral lower lobe atelectasis. 3. Small amount of nonspecific free fluid in the pelvic cavity. 4. Anasarca. 5. Cholelithiasis.      Xr Chest (single View Frontal)    Result Date: 4/13/2019  EXAMINATION: SINGLE XRAY VIEW OF THE CHEST 4/13/2019 7:18 am COMPARISON: April 12, 2019 HISTORY: ORDERING SYSTEM PROVIDED HISTORY: dyspnea TECHNOLOGIST PROVIDED HISTORY: dyspnea Ordering Physician Provided Reason for Exam: dyspnea Acuity: Acute Type of Exam: Subsequent/Follow-up Additional signs and symptoms: dyspnea FINDINGS: A right IJ catheter is seen with its tip terminating at the superior cavoatrial junction. The left chest wall pacemaker and leads are stable. The cardiomediastinal silhouette is stable. There is interval increased opacity at the right lung base, may be related to atelectasis versus pneumonia. There is small atelectasis and mild pleural effusion at the left lung base. There is no pneumothorax. There is no acute osseous abnormality. Interval increased opacity at the right lung base, may be related to atelectasis versus pneumonia. Small atelectasis and mild pleural effusion at the left lung, new since the prior study. Xr Femur Left (min 2 Views)    Result Date: 3/27/2019  EXAMINATION: 4 XRAY VIEWS OF THE LEFT FEMUR; 2 XRAY VIEWS OF THE LEFT KNEE; 3 XRAY VIEWS OF THE LEFT TIBIA AND FIBULA 3/27/2019 7:00 pm COMPARISON: Left hip 11/14/2015. HISTORY: ORDERING SYSTEM PROVIDED HISTORY: pain TECHNOLOGIST PROVIDED HISTORY: pain Ordering Physician Provided Reason for Exam: pt twisted wrong, lt knee pain, unable to straighten knee. Acuity: Acute Type of Exam: Initial FINDINGS: Left femur, four views: The bones are diffusely osteopenic. No acute fracture deformity. The hip joint is maintained. The femoral head projects within the acetabulum. No focal soft tissue abnormality is seen. Left knee, two views: The knee is flexed. No acute osseous abnormality. No joint effusion. Joint spaces are not optimally profiled but no significant arthritic changes are apparent. Left tib-fib, three views: There is evidence of a remote healed distal tibia fracture. No acute fracture or dislocation is seen. No focal soft tissue abnormality.      No acute osseous effusion. Interval casting. Question nondisplaced fracture in the lateral femoral metaphysis. Osteopenia. Xr Tibia Fibula Left (2 Views)    Result Date: 3/27/2019  EXAMINATION: 4 XRAY VIEWS OF THE LEFT FEMUR; 2 XRAY VIEWS OF THE LEFT KNEE; 3 XRAY VIEWS OF THE LEFT TIBIA AND FIBULA 3/27/2019 7:00 pm COMPARISON: Left hip 11/14/2015. HISTORY: ORDERING SYSTEM PROVIDED HISTORY: pain TECHNOLOGIST PROVIDED HISTORY: pain Ordering Physician Provided Reason for Exam: pt twisted wrong, lt knee pain, unable to straighten knee. Acuity: Acute Type of Exam: Initial FINDINGS: Left femur, four views: The bones are diffusely osteopenic. No acute fracture deformity. The hip joint is maintained. The femoral head projects within the acetabulum. No focal soft tissue abnormality is seen. Left knee, two views: The knee is flexed. No acute osseous abnormality. No joint effusion. Joint spaces are not optimally profiled but no significant arthritic changes are apparent. Left tib-fib, three views: There is evidence of a remote healed distal tibia fracture. No acute fracture or dislocation is seen. No focal soft tissue abnormality. No acute osseous abnormality of the left femur. No acute osseous abnormality of the left knee. No acute osseous abnormality of the left tib-fib. Healed remote distal tibia fracture. Marked osteopenia, likely due to disuse. Xr Abdomen (kub) (single Ap View)    Result Date: 4/14/2019  EXAMINATION: SINGLE SUPINE XRAY VIEW(S) OF THE ABDOMEN 4/14/2019 7:39 am COMPARISON: CT abdomen and pelvis  film from 11 April 2019 HISTORY: [address] [place] [address] Avenue: Abd Distention TECHNOLOGIST PROVIDED HISTORY: Abd Distention Ordering Physician Provided Reason for Exam: Abdominal distention. Pt was moving around in the bed. Best films at present time. Acuity: Acute Type of Exam: Initial Additional signs and symptoms: Abdominal distention. Pt was moving around in the bed.  Best films at present time. FINDINGS: Portable view time stamped at 748 hours demonstrates an intestinal tube terminating in the midportion of a gaseous Spike dilated stomach. Densities are present over the stomach likely medication. Bipolar pacemaker is in situ with intact leads. Heart size is top-normal, stable. Gaseous distension of the stomach and loop of bowel in the upper mid abdomen is noted but there is gas and fecal material in the rectum. Gastric outlet obstruction or proximal small bowel partial obstruction is suspected. No free air is noted. Midline city is present over the pelvis likely a monitor or CT small bore catheter. Vascular calcification is present in the pelvis. Persistent preferential gaseous distension of the stomach although there is some gas in the upper abdomen and gas and fecal material present in the rectosigmoid. Findings suggest gastric outlet obstruction. Ct Abdomen Pelvis W Iv Contrast    Result Date: 4/11/2019  EXAMINATION: CT OF THE ABDOMEN AND PELVIS WITH CONTRAST 4/11/2019 5:14 pm TECHNIQUE: CT of the abdomen and pelvis was performed with the administration of intravenous contrast. Multiplanar reformatted images are provided for review. Dose modulation, iterative reconstruction, and/or weight based adjustment of the mA/kV was utilized to reduce the radiation dose to as low as reasonably achievable. COMPARISON: None. HISTORY: ORDERING SYSTEM PROVIDED HISTORY: Abdominal pain TECHNOLOGIST PROVIDED HISTORY: IV Only Contrast Ordering Physician Provided Reason for Exam: patient c/o abd pain for an hour FINDINGS: Lower Chest: Trace pleural fluid bilaterally is noted. Indwelling cardiac pacemaker is present. Heart size is normal.  Coronary artery calcifications are evident. The esophagus is significantly dilated and fluid-filled. No paraesophageal adenopathy is evident. Organs: The spleen, pancreas, and adrenals are unremarkable. The liver contains hypodensities, likely cysts.   The gallbladder is distended and contains a solitary gallstone. The kidneys excrete contrast bilaterally. Extrarenal collecting systems are noted. The ureters are mildly dilated down to the urinary bladder without evidence of intraluminal or ureteral obstructing calculi. GI/Bowel: Marked distention of the stomach is noted; this continues to the pylorus with appearance suggesting partial gastric outlet obstruction. Fluid is present in some small bowel loops and colon distal to the stomach. No small bowel obstruction. Pelvis: Marked distention of the urinary bladder is noted. 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 Distended ureters may be related to reflux or infection. No free pelvic fluid, pelvic or inguinal adenopathy is noted. Peritoneum/Retroperitoneum: No aortic aneurysm. Mild to moderate plaque is noted in the infrarenal abdominal aorta and both proximal iliac arteries. Shotty lymph nodes are present around the aorta in the upper abdomen. No mesenteric adenopathy is noted. Bones/Soft Tissues: Degenerative changes are present in the hips and lower lumbar facets. Estimated biologic radiation dose for this procedure:258.77 mGy/cm2.     1. Dilatation of the thoracic esophagus filled with fluid. No obstructive process is noted. No adjacent enlarged lymph nodes. 2. Trace pleural fluid. 3. Marked distention of the stomach. This appears to extend to the pylorus. Partial gastric outlet obstruction is not excluded. 4. Bilateral dilated ureters without obstructing calculi. Urinary bladder is markedly distended with bladder wall air suggesting emphysematous cystitis. Dilatation of the ureters may be related to reflux or infection. 5. Atherosclerotic disease. 6. Other findings as above. Critical results were called by Dr. Morenita Gonzalez MD to [address] [place] [address] on 4/11/2019 at 17:37.      Xr Chest Portable    Result Date: 4/16/2019  EXAMINATION: SINGLE XRAY VIEW OF THE CHEST 4/16/2019 6:54 am COMPARISON: 04/15/2019, 610 hours HISTORY: ORDERING SYSTEM PROVIDED HISTORY: ETT placement TECHNOLOGIST PROVIDED HISTORY: ETT placement Ordering Physician Provided Reason for Exam: on vent Acuity: Acute Type of Exam: Initial 70-year-old female on ventilator; check endotracheal tube placement FINDINGS: Portable AP upright view of the chest. Endotracheal tube distal tip overlying the mid trachea approximately 4.1 cm above the level of the ron. Enteric tube traverses the GE junction with distal tip excluded from the field of view. Left subclavian approach cardiac pacemaker device distal lead tips relatively stable in position. Right internal jugular approach central venous catheter distal tip overlying the high right atrium, stable. Cardiac monitor leads overlie the chest. Atherosclerotic calcification of the thoracic aorta. Slight stable volume loss of the left hemithorax. No pneumothorax. No free air. Dense retrocardiac/left basilar airspace consolidation and small left-sided pleural effusion. Stable mild focal opacity at the right mid lung zone. Underlying COPD. Stable mild pulmonary vascular congestion and left-sided predominant parahilar opacity. Visualized osseous structures remain unchanged. 1. Stable multifocal airspace disease as detailed above with dense retrocardiac/left basilar airspace consolidation and small left-sided pleural effusion. Mild pulmonary vascular congestion. Findings may represent edema or multifocal pneumonia. 2. Underlying COPD. 3. Tubes and line as detailed above.      Xr Chest Portable    Result Date: 4/15/2019  EXAMINATION: SINGLE XRAY VIEW OF THE CHEST 4/15/2019 6:47 am COMPARISON: 14 April 2019 HISTORY: ORDERING SYSTEM PROVIDED HISTORY: ETT placement TECHNOLOGIST PROVIDED HISTORY: ETT placement Ordering Physician Provided Reason for Exam: on vent Acuity: Acute Type of Exam: Initial FINDINGS: AP portable view of the chest time stamped at 612 hours line placement. FINDINGS: Stable left pectoral trans venous cardiac pacer device. New right IJ central venous catheter with tip near the superior atrial caval junction. Normal lung volume. No new consolidation. Curvilinear radiopacity projecting over the right upper lobe likely represents artifact from a skin fold. No pleural effusion or pneumothorax. Stable cardiomediastinal silhouette and great vessels with redemonstration of atherosclerotic thoracic aorta. New right IJ central venous catheter with tip near the superior atrial caval junction. No pneumothorax. No new consolidation. Thank you for allowing me to participate in the care of your patient. Please feel free to contact me with any questions or concerns.      Norm Allan MD

## 2019-05-17 NOTE — PROGRESS NOTES
General Surgery Progress Note            PATIENT NAME: Maryam Patterson     TODAY'S DATE: 5/17/2019, 11:22 AM    SUBJECTIVE:    Pt  Seen and examined. Remains intubated. OBJECTIVE:   VITALS:  BP (!) 62/19   Pulse 115   Temp 97.7 °F (36.5 °C) (Oral)   Resp 17   Ht 5' (1.524 m)   Wt 105 lb 2.6 oz (47.7 kg)   SpO2 100%   BMI 20.54 kg/m²      INTAKE/OUTPUT:      Intake/Output Summary (Last 24 hours) at 5/17/2019 1122  Last data filed at 5/17/2019 0725  Gross per 24 hour   Intake 4575.51 ml   Output 2475 ml   Net 2100.51 ml                 CONSTITUTIONAL:  awake and alert. No acute distress  HEART:   Regular   LUNGS:   Clear   ABDOMEN:   Abdomen soft, moderately tender, non-distended. Hypoactive BS. Incision intact.  Drain SS  EXTREMITIES:   1+ edema    Data:  CBC with Differential:    Lab Results   Component Value Date    WBC 18.6 05/17/2019    RBC 2.76 05/17/2019    RBC 3.66 05/23/2012    HGB 7.4 05/17/2019    HCT 23.5 05/17/2019     05/17/2019     05/23/2012    MCV 85.2 05/17/2019    MCH 26.9 05/17/2019    MCHC 31.6 05/17/2019    RDW 17.1 05/17/2019    METASPCT 2 05/15/2019    LYMPHOPCT 5 05/17/2019    MONOPCT 1 05/17/2019    MYELOPCT 1 05/07/2019    BASOPCT 0 05/17/2019    MONOSABS 0.19 05/17/2019    LYMPHSABS 0.93 05/17/2019    EOSABS 0.00 05/17/2019    BASOSABS 0.00 05/17/2019    DIFFTYPE NOT REPORTED 05/17/2019     CMP:    Lab Results   Component Value Date     05/17/2019    K 3.2 05/17/2019     05/17/2019    CO2 22 05/17/2019    BUN 6 05/17/2019    CREATININE <0.40 05/17/2019    GFRAA CANNOT BE CALCULATED 05/17/2019    LABGLOM CANNOT BE CALCULATED 05/17/2019    GLUCOSE 126 05/17/2019    GLUCOSE 87 05/21/2012    PROT 5.9 05/11/2019    LABALBU 2.6 05/17/2019    LABALBU 4.6 05/17/2012    CALCIUM 7.8 05/17/2019    BILITOT 0.38 05/11/2019    ALKPHOS 196 05/11/2019    AST 19 05/11/2019    ALT 18 05/11/2019         ASSESSMENT     Principal Problem:    GI bleed  Active Problems: Sepsis due to urinary tract infection (HCC)    Cystitis    Emphysematous cystitis    Septic shock (HCC)    Leukemoid reaction    Aspiration pneumonia of right lower lobe due to vomit (HCC)    MRSA carrier    Urinary retention    Abdominal distention    Elevated CEA    Elevated CA 19-9 level    Cholecystitis    CRP elevated    Elevated procalcitonin    Bandemia    Centrilobular emphysema (HCC)    Hemorrhage of rectum and anus    Anemia    Small bowel obstruction (HCC)    Anxiety disorder    Noncompliance  Resolved Problems:    * No resolved hospital problems. *  S/P right colectomy, open cholecystectomy    Plan  1. 1L bolus NS  2. Wean off Levophed as tolerated  3. Wean off vent  4. Resume TPN  5.  Continue antibiotics        Koffi Aponte, [address]

## 2019-05-17 NOTE — PROGRESS NOTES
Pharmacy Note  Vancomycin Consult        Vancomycin Day: 2  Dose = 750 mg every 12 hours. Plan:   trough at 9 pm tonight. Patient's labs, cultures, vitals, and vancomycin regimen reviewed. No changes today. Belle Blake. Ph.  5/17/2019  8:00 AM

## 2019-05-17 NOTE — CARE COORDINATION
DISCHARGE PLANNING NOTE:    Patient remains on [facility]. Appeal for [facility] was denied through insurance. [facility] is still continuing to follow in case we would need to re-submit for patient to go there. [facility] is following as well. On IV merrem and IV vanco, steroids 100Q6 and levophed. Will continue to follow along with LSW for any discharge needs.      Electronically signed by Jovita Alcantar RN on 5/17/2019 at 11:37 AM

## 2019-05-17 NOTE — PROGRESS NOTES
ICU Progress Note ([facility])   Pulmonary and Critical Care Specialists    Patient - Fercho Esquivel,  Age - 77 y.o.    - 1950      Room Number -    MRN -  [id number]   [facility]  Date of Admission -  2019  2:48 PM     Follow-up: Acute respiratory failure    Events of Past 24 Hours   On AC 16, 500, + 5 & 28 % FiO2   on Diprivan and Levophed drips, on lactated Ringer at 100 mL per hour  No Fever or chills , not much tracheal secretions  Not Much NG drainage , no feeding  Blood Pressure low 80s  Adequate urine output    Vitals    height is 5' (1.524 m) and weight is 105 lb 2.6 oz (47.7 kg). Her oral temperature is 97.7 °F (36.5 °C). Her blood pressure is 62/19 (abnormal) and her pulse is 115. Her respiration is 17 and oxygen saturation is 100%. Temperature Range: Temp: 97.7 °F (36.5 °C) Temp  Av.3 °F (36.8 °C)  Min: 97.7 °F (36.5 °C)  Max: 98.6 °F (37 °C)  BP Range:  Systolic (26XWY), GNF:680 , Min:62 , KVZ:714     Diastolic (77FST), BLQ:62, Min:19, Max:114    Pulse Range: Pulse  Av.8  Min: 92  Max: 127  Respiration Range: Resp  Av.1  Min: 13  Max: 31  Current Pulse Ox[de-identified]  SpO2: 100 %  24HR Pulse Ox Range:  SpO2  Av %  Min: 96 %  Max: 100 %  Oxygen Amount and Delivery: O2 Flow Rate (L/min): 2 L/min      Wt Readings from Last 3 Encounters:   19 105 lb 2.6 oz (47.7 kg)   19 89 lb (40.4 kg)   19 94 lb 1.6 oz (42.7 kg)     I/O       Intake/Output Summary (Last 24 hours) at 2019 1041  Last data filed at 2019 0725  Gross per 24 hour   Intake 4575.51 ml   Output 2475 ml   Net 2100.51 ml     I/O last 3 completed shifts:   In: 4575.5 [I.V.:4225.5; IV Piggyback:350]  Out: 9438 [Urine:1500; Emesis/NG output:300; Drains:440]     DRAIN/TUBE OUTPUT:     Invasive Lines   ETT Day -     Lines -      ICP PRESSURE RANGE:  No data recorded  CVP PRESSURE RANGE:  No data recorded  Mechanical Ventilation Data   SETTINGS (Comprehensive)  Vent is 115. Her respiration is 17 and oxygen saturation is 100%. Ventilator Settings (Basic)  Vent Mode: PRVC Rate Set: 16 bmp/Vt Ordered: 500 mL/ /FiO2 : 28 %    Constitutional - awake alert, on vent  General Appearance  well developed, no acute distress  HEENT - Life support devices in place (ET, NG),normocephalic, atraumatic. PERRLA  Lungs - Chest expands equally, no wheezes, rales , coarse rhonchi. Cardiovascular - Heart sounds are normal.  normal rate and rhythm regular, no murmur, gallop or rub.   Abdomen - soft, little tender, nondistended, bowel sound present  Neurologic - awake and alert despite Diprivan drip, following commands  Skin - no bruising or bleeding  Extremities - no cyanosis, clubbing or positive edema especially upper extremities    Lab Results   CBC     Lab Results   Component Value Date    WBC 18.6 05/17/2019    RBC 2.76 05/17/2019    RBC 3.66 05/23/2012    HGB 7.4 05/17/2019    HCT 23.5 05/17/2019     05/17/2019     05/23/2012    MCV 85.2 05/17/2019    MCH 26.9 05/17/2019    MCHC 31.6 05/17/2019    RDW 17.1 05/17/2019    METASPCT 2 05/15/2019    LYMPHOPCT 5 05/17/2019    MONOPCT 1 05/17/2019    MYELOPCT 1 05/07/2019    BASOPCT 0 05/17/2019    MONOSABS 0.19 05/17/2019    LYMPHSABS 0.93 05/17/2019    EOSABS 0.00 05/17/2019    BASOSABS 0.00 05/17/2019    DIFFTYPE NOT REPORTED 05/17/2019       BMP   Lab Results   Component Value Date     05/17/2019    K 3.2 05/17/2019     05/17/2019    CO2 22 05/17/2019    BUN 6 05/17/2019    CREATININE <0.40 05/17/2019    GLUCOSE 126 05/17/2019    GLUCOSE 87 05/21/2012    CALCIUM 7.8 05/17/2019       LFTS  Lab Results   Component Value Date    ALKPHOS 196 05/11/2019    ALT 18 05/11/2019    AST 19 05/11/2019    PROT 5.9 05/11/2019    BILITOT 0.38 05/11/2019    BILIDIR 0.21 05/11/2019    IBILI 0.17 05/11/2019    LABALBU 2.6 05/17/2019    LABALBU 4.6 05/17/2012       INR  Recent Labs     05/15/19  0801 05/16/19  0412 05/17/19  0417 PROTIME 15.6* 16.3* 16.1*   INR 1.2 1.3 1.3       APTT  Recent Labs     05/15/19  0801 05/16/19  0412 05/17/19  0417   APTT 32.3 34.7 36.5*       Lactic Acid  Lab Results   Component Value Date    LACTA 1.9 05/16/2019    LACTA 1.8 05/12/2019    LACTA 0.9 05/11/2019        BNP   No results for input(s): BNP in the last 72 hours. Cultures       Radiology     Plain Films: ET tube 5 cm above the ron, bilateral pleural effusions         CT Scans    See actual reports for details    SYSTEM ASSESSMENT      Neuro       Respiratory   Wean oxygen as tolerated. Keep O2 sat > 88%       Hemodynamics       Gastrointestinal/Nutrition       Renal       Infectious Disease       Hematology/Oncology       Endocrine       Social/Spiritual/DNR/Disposition/Other   Acute respiratory failure with hypoxia day #3 on vent, was on vent prior on this admission and was extubated 7/66  Metabolic acidosis/improved  Hypotension on Levophed drip ? ? Medication related  Leukocytosis? ?  If reactive or a new infection, coming down  Severe COPD  Acute cholecystitis/cholecystostomy tube 4/22 then had ex lap 5/16 with  extensive lysis of adhesions, right colectomy with anastomosis and open cholecystectomy, found to have cholecystostomy tube traversing through the transverse colon  E. coli UTI/aspiration pneumonia   History of CVA with left hemiparesis  Anxiety/depression  Recent GI bleed  Hypervolemia fluid balance + 2335 ML last 24-hour And over 18 L since admission    Plan:    Continue vent support  DC Diprivan and see if that will help the blood pressure, use Versed when necessary or a drip  Protonix and Lovenox  Decrease IV Fluids and pressors as needed, dose of Lasix if systolic blood pressure >646  Antibiotic per ID, on vancomycin and meropenem, follow-up cultures  Bronchodilators  Discussed with staff    Critical Care Time   > 30 min    Electronically signed by Arian Pham MD on 5/17/2019 at 10:41 AM

## 2019-05-18 ENCOUNTER — APPOINTMENT (OUTPATIENT)
Dept: GENERAL RADIOLOGY | Age: 69
DRG: 853 | End: 2019-05-18
Payer: COMMERCIAL

## 2019-05-18 PROBLEM — J96.00 ACUTE RESPIRATORY FAILURE (HCC): Status: ACTIVE | Noted: 2019-05-18

## 2019-05-18 LAB
ABSOLUTE BANDS #: 0.2 K/UL (ref 0–1)
ABSOLUTE EOS #: 0 K/UL (ref 0–0.4)
ABSOLUTE IMMATURE GRANULOCYTE: ABNORMAL K/UL (ref 0–0.3)
ABSOLUTE LYMPH #: 0.3 K/UL (ref 1–4.8)
ABSOLUTE MONO #: 0.3 K/UL (ref 0.1–1.3)
ALBUMIN SERPL-MCNC: 3.5 G/DL (ref 3.5–5.2)
ALBUMIN/GLOBULIN RATIO: ABNORMAL (ref 1–2.5)
ALLEN TEST: ABNORMAL
ALP BLD-CCNC: 62 U/L (ref 35–104)
ALT SERPL-CCNC: 11 U/L (ref 5–33)
ANION GAP SERPL CALCULATED.3IONS-SCNC: 12 MMOL/L (ref 9–17)
AST SERPL-CCNC: 13 U/L
BANDS: 2 % (ref 0–10)
BASOPHILS # BLD: 0 % (ref 0–2)
BASOPHILS ABSOLUTE: 0 K/UL (ref 0–0.2)
BILIRUB SERPL-MCNC: 0.37 MG/DL (ref 0.3–1.2)
BUN BLDV-MCNC: 8 MG/DL (ref 8–23)
BUN/CREAT BLD: ABNORMAL (ref 9–20)
CALCIUM SERPL-MCNC: 7.8 MG/DL (ref 8.6–10.4)
CARBOXYHEMOGLOBIN: 1.1 % (ref 0–5)
CHLORIDE BLD-SCNC: 104 MMOL/L (ref 98–107)
CO2: 24 MMOL/L (ref 20–31)
CREAT SERPL-MCNC: <0.4 MG/DL (ref 0.5–0.9)
DIFFERENTIAL TYPE: ABNORMAL
EOSINOPHILS RELATIVE PERCENT: 0 % (ref 0–4)
FIO2: 28
GFR AFRICAN AMERICAN: ABNORMAL ML/MIN
GFR NON-AFRICAN AMERICAN: ABNORMAL ML/MIN
GFR SERPL CREATININE-BSD FRML MDRD: ABNORMAL ML/MIN/{1.73_M2}
GFR SERPL CREATININE-BSD FRML MDRD: ABNORMAL ML/MIN/{1.73_M2}
GLUCOSE BLD-MCNC: 144 MG/DL (ref 65–105)
GLUCOSE BLD-MCNC: 145 MG/DL (ref 65–105)
GLUCOSE BLD-MCNC: 154 MG/DL (ref 70–99)
GLUCOSE BLD-MCNC: 169 MG/DL (ref 65–105)
HCO3 ARTERIAL: 25.6 MMOL/L (ref 22–26)
HCT VFR BLD CALC: 26.5 % (ref 36–46)
HCT VFR BLD CALC: 27 % (ref 36–46)
HEMOGLOBIN: 8.9 G/DL (ref 12–16)
HEMOGLOBIN: 9 G/DL (ref 12–16)
IMMATURE GRANULOCYTES: ABNORMAL %
INR BLD: 1.3
LYMPHOCYTES # BLD: 3 % (ref 24–44)
MAGNESIUM: 1.7 MG/DL (ref 1.6–2.6)
MCH RBC QN AUTO: 28.8 PG (ref 26–34)
MCHC RBC AUTO-ENTMCNC: 34 G/DL (ref 31–37)
MCV RBC AUTO: 84.8 FL (ref 80–100)
METHEMOGLOBIN: 0 % (ref 0–1.9)
MODE: ABNORMAL
MONOCYTES # BLD: 3 % (ref 1–7)
MORPHOLOGY: ABNORMAL
NEGATIVE BASE EXCESS, ART: ABNORMAL MMOL/L (ref 0–2)
NOTIFICATION TIME: ABNORMAL
NOTIFICATION: ABNORMAL
NRBC AUTOMATED: ABNORMAL PER 100 WBC
O2 DEVICE/FLOW/%: ABNORMAL
O2 SAT, ARTERIAL: 96.5 % (ref 95–98)
OXYHEMOGLOBIN: ABNORMAL % (ref 95–98)
PARTIAL THROMBOPLASTIN TIME: 31.3 SEC (ref 24–36)
PATIENT TEMP: 37
PCO2 ARTERIAL: 36.1 MMHG (ref 35–45)
PCO2, ART, TEMP ADJ: ABNORMAL (ref 35–45)
PDW BLD-RTO: 15.5 % (ref 11.5–14.9)
PEEP/CPAP: 5
PH ARTERIAL: 7.46 (ref 7.35–7.45)
PH, ART, TEMP ADJ: ABNORMAL (ref 7.35–7.45)
PHOSPHORUS: 2.8 MG/DL (ref 2.6–4.5)
PLATELET # BLD: 203 K/UL (ref 150–450)
PLATELET ESTIMATE: ABNORMAL
PMV BLD AUTO: 8.6 FL (ref 6–12)
PO2 ARTERIAL: 85.1 MMHG (ref 80–100)
PO2, ART, TEMP ADJ: ABNORMAL MMHG (ref 80–100)
POSITIVE BASE EXCESS, ART: 1.7 MMOL/L (ref 0–2)
POTASSIUM SERPL-SCNC: 3 MMOL/L (ref 3.7–5.3)
POTASSIUM SERPL-SCNC: 3.1 MMOL/L (ref 3.7–5.3)
POTASSIUM SERPL-SCNC: 3.2 MMOL/L (ref 3.7–5.3)
PREALBUMIN: 10.6 MG/DL (ref 20–40)
PROTHROMBIN TIME: 16.2 SEC (ref 11.8–14.6)
PSV: ABNORMAL
PT. POSITION: ABNORMAL
RBC # BLD: 3.13 M/UL (ref 4–5.2)
RBC # BLD: ABNORMAL 10*6/UL
RESPIRATORY RATE: 16
SAMPLE SITE: ABNORMAL
SEG NEUTROPHILS: 92 % (ref 36–66)
SEGMENTED NEUTROPHILS ABSOLUTE COUNT: 9.1 K/UL (ref 1.3–9.1)
SET RATE: 16
SODIUM BLD-SCNC: 140 MMOL/L (ref 135–144)
TEXT FOR RESPIRATORY: ABNORMAL
TOTAL HB: ABNORMAL G/DL (ref 12–16)
TOTAL PROTEIN: 5.2 G/DL (ref 6.4–8.3)
TOTAL RATE: 16
VT: 500
WBC # BLD: 9.9 K/UL (ref 3.5–11)
WBC # BLD: ABNORMAL 10*3/UL

## 2019-05-18 PROCEDURE — 82805 BLOOD GASES W/O2 SATURATION: CPT

## 2019-05-18 PROCEDURE — 71045 X-RAY EXAM CHEST 1 VIEW: CPT

## 2019-05-18 PROCEDURE — C9113 INJ PANTOPRAZOLE SODIUM, VIA: HCPCS | Performed by: SURGERY

## 2019-05-18 PROCEDURE — 94770 HC ETCO2 MONITOR DAILY: CPT

## 2019-05-18 PROCEDURE — 6360000002 HC RX W HCPCS: Performed by: STUDENT IN AN ORGANIZED HEALTH CARE EDUCATION/TRAINING PROGRAM

## 2019-05-18 PROCEDURE — 80053 COMPREHEN METABOLIC PANEL: CPT

## 2019-05-18 PROCEDURE — 94640 AIRWAY INHALATION TREATMENT: CPT

## 2019-05-18 PROCEDURE — 2580000003 HC RX 258: Performed by: INTERNAL MEDICINE

## 2019-05-18 PROCEDURE — P9047 ALBUMIN (HUMAN), 25%, 50ML: HCPCS | Performed by: STUDENT IN AN ORGANIZED HEALTH CARE EDUCATION/TRAINING PROGRAM

## 2019-05-18 PROCEDURE — 84100 ASSAY OF PHOSPHORUS: CPT

## 2019-05-18 PROCEDURE — 6360000002 HC RX W HCPCS: Performed by: SURGERY

## 2019-05-18 PROCEDURE — 84134 ASSAY OF PREALBUMIN: CPT

## 2019-05-18 PROCEDURE — 94003 VENT MGMT INPAT SUBQ DAY: CPT

## 2019-05-18 PROCEDURE — 6370000000 HC RX 637 (ALT 250 FOR IP): Performed by: INTERNAL MEDICINE

## 2019-05-18 PROCEDURE — 85025 COMPLETE CBC W/AUTO DIFF WBC: CPT

## 2019-05-18 PROCEDURE — 36600 WITHDRAWAL OF ARTERIAL BLOOD: CPT

## 2019-05-18 PROCEDURE — 2580000003 HC RX 258: Performed by: SURGERY

## 2019-05-18 PROCEDURE — 99233 SBSQ HOSP IP/OBS HIGH 50: CPT | Performed by: INTERNAL MEDICINE

## 2019-05-18 PROCEDURE — 85730 THROMBOPLASTIN TIME PARTIAL: CPT

## 2019-05-18 PROCEDURE — 83735 ASSAY OF MAGNESIUM: CPT

## 2019-05-18 PROCEDURE — 6370000000 HC RX 637 (ALT 250 FOR IP): Performed by: SURGERY

## 2019-05-18 PROCEDURE — 36415 COLL VENOUS BLD VENIPUNCTURE: CPT

## 2019-05-18 PROCEDURE — 82947 ASSAY GLUCOSE BLOOD QUANT: CPT

## 2019-05-18 PROCEDURE — 99999 PR OFFICE/OUTPT VISIT,PROCEDURE ONLY: CPT | Performed by: INTERNAL MEDICINE

## 2019-05-18 PROCEDURE — 94762 N-INVAS EAR/PLS OXIMTRY CONT: CPT

## 2019-05-18 PROCEDURE — 2500000003 HC RX 250 WO HCPCS: Performed by: SURGERY

## 2019-05-18 PROCEDURE — 85610 PROTHROMBIN TIME: CPT

## 2019-05-18 PROCEDURE — 84132 ASSAY OF SERUM POTASSIUM: CPT

## 2019-05-18 PROCEDURE — 6360000002 HC RX W HCPCS: Performed by: INTERNAL MEDICINE

## 2019-05-18 PROCEDURE — 2000000000 HC ICU R&B

## 2019-05-18 PROCEDURE — 2700000000 HC OXYGEN THERAPY PER DAY

## 2019-05-18 RX ORDER — FUROSEMIDE 10 MG/ML
20 INJECTION INTRAMUSCULAR; INTRAVENOUS 2 TIMES DAILY
Status: DISCONTINUED | OUTPATIENT
Start: 2019-05-18 | End: 2019-05-23

## 2019-05-18 RX ADMIN — POTASSIUM CHLORIDE 10 MEQ: 7.46 INJECTION, SOLUTION INTRAVENOUS at 11:04

## 2019-05-18 RX ADMIN — MIDAZOLAM 3 MG/HR: 5 INJECTION INTRAMUSCULAR; INTRAVENOUS at 08:57

## 2019-05-18 RX ADMIN — POTASSIUM CHLORIDE 10 MEQ: 7.46 INJECTION, SOLUTION INTRAVENOUS at 18:32

## 2019-05-18 RX ADMIN — ALBUMIN (HUMAN) 50 G: 0.25 INJECTION, SOLUTION INTRAVENOUS at 11:08

## 2019-05-18 RX ADMIN — POTASSIUM CHLORIDE 10 MEQ: 7.46 INJECTION, SOLUTION INTRAVENOUS at 08:54

## 2019-05-18 RX ADMIN — HYDROMORPHONE HYDROCHLORIDE 0.5 MG: 1 INJECTION, SOLUTION INTRAMUSCULAR; INTRAVENOUS; SUBCUTANEOUS at 00:06

## 2019-05-18 RX ADMIN — POTASSIUM CHLORIDE 10 MEQ: 7.46 INJECTION, SOLUTION INTRAVENOUS at 17:27

## 2019-05-18 RX ADMIN — INSULIN LISPRO 1 UNITS: 100 INJECTION, SOLUTION INTRAVENOUS; SUBCUTANEOUS at 06:34

## 2019-05-18 RX ADMIN — VANCOMYCIN HYDROCHLORIDE 750 MG: 5 INJECTION, POWDER, LYOPHILIZED, FOR SOLUTION INTRAVENOUS at 11:09

## 2019-05-18 RX ADMIN — HYDROCORTISONE SODIUM SUCCINATE 100 MG: 100 INJECTION, POWDER, FOR SOLUTION INTRAMUSCULAR; INTRAVENOUS at 14:42

## 2019-05-18 RX ADMIN — POTASSIUM CHLORIDE 10 MEQ: 7.46 INJECTION, SOLUTION INTRAVENOUS at 23:26

## 2019-05-18 RX ADMIN — ALBUMIN (HUMAN) 50 G: 0.25 INJECTION, SOLUTION INTRAVENOUS at 04:38

## 2019-05-18 RX ADMIN — HYDROCORTISONE SODIUM SUCCINATE 100 MG: 100 INJECTION, POWDER, FOR SOLUTION INTRAMUSCULAR; INTRAVENOUS at 09:13

## 2019-05-18 RX ADMIN — IPRATROPIUM BROMIDE AND ALBUTEROL SULFATE 1 AMPULE: .5; 3 SOLUTION RESPIRATORY (INHALATION) at 19:23

## 2019-05-18 RX ADMIN — MIDAZOLAM 3 MG/HR: 5 INJECTION INTRAMUSCULAR; INTRAVENOUS at 17:18

## 2019-05-18 RX ADMIN — INSULIN LISPRO 1 UNITS: 100 INJECTION, SOLUTION INTRAVENOUS; SUBCUTANEOUS at 00:10

## 2019-05-18 RX ADMIN — PANTOPRAZOLE SODIUM 40 MG: 40 INJECTION, POWDER, FOR SOLUTION INTRAVENOUS at 07:43

## 2019-05-18 RX ADMIN — MEROPENEM 1 G: 1 INJECTION, POWDER, FOR SOLUTION INTRAVENOUS at 17:40

## 2019-05-18 RX ADMIN — FUROSEMIDE 20 MG: 10 INJECTION, SOLUTION INTRAMUSCULAR; INTRAVENOUS at 18:23

## 2019-05-18 RX ADMIN — HYDROCORTISONE SODIUM SUCCINATE 100 MG: 100 INJECTION, POWDER, FOR SOLUTION INTRAMUSCULAR; INTRAVENOUS at 01:48

## 2019-05-18 RX ADMIN — POTASSIUM CHLORIDE 10 MEQ: 7.46 INJECTION, SOLUTION INTRAVENOUS at 22:17

## 2019-05-18 RX ADMIN — POTASSIUM CHLORIDE 10 MEQ: 7.46 INJECTION, SOLUTION INTRAVENOUS at 16:20

## 2019-05-18 RX ADMIN — HYDROMORPHONE HYDROCHLORIDE 0.5 MG: 1 INJECTION, SOLUTION INTRAMUSCULAR; INTRAVENOUS; SUBCUTANEOUS at 04:38

## 2019-05-18 RX ADMIN — MEROPENEM 1 G: 1 INJECTION, POWDER, FOR SOLUTION INTRAVENOUS at 10:26

## 2019-05-18 RX ADMIN — INSULIN LISPRO 1 UNITS: 100 INJECTION, SOLUTION INTRAVENOUS; SUBCUTANEOUS at 17:33

## 2019-05-18 RX ADMIN — Medication 10 ML: at 07:43

## 2019-05-18 RX ADMIN — POTASSIUM CHLORIDE 10 MEQ: 7.46 INJECTION, SOLUTION INTRAVENOUS at 09:54

## 2019-05-18 RX ADMIN — IPRATROPIUM BROMIDE AND ALBUTEROL SULFATE 1 AMPULE: .5; 3 SOLUTION RESPIRATORY (INHALATION) at 15:26

## 2019-05-18 RX ADMIN — POTASSIUM CHLORIDE 10 MEQ: 7.46 INJECTION, SOLUTION INTRAVENOUS at 15:21

## 2019-05-18 RX ADMIN — I.V. FAT EMULSION 100 ML: 20 EMULSION INTRAVENOUS at 17:33

## 2019-05-18 RX ADMIN — HYDROMORPHONE HYDROCHLORIDE 0.5 MG: 1 INJECTION, SOLUTION INTRAMUSCULAR; INTRAVENOUS; SUBCUTANEOUS at 11:02

## 2019-05-18 RX ADMIN — HYDROCORTISONE SODIUM SUCCINATE 100 MG: 100 INJECTION, POWDER, FOR SOLUTION INTRAMUSCULAR; INTRAVENOUS at 20:24

## 2019-05-18 RX ADMIN — ALBUMIN (HUMAN) 50 G: 0.25 INJECTION, SOLUTION INTRAVENOUS at 18:22

## 2019-05-18 RX ADMIN — HYDROMORPHONE HYDROCHLORIDE 0.5 MG: 1 INJECTION, SOLUTION INTRAMUSCULAR; INTRAVENOUS; SUBCUTANEOUS at 14:44

## 2019-05-18 RX ADMIN — FUROSEMIDE 20 MG: 10 INJECTION, SOLUTION INTRAMUSCULAR; INTRAVENOUS at 11:03

## 2019-05-18 RX ADMIN — CALCIUM GLUCONATE: 94 INJECTION, SOLUTION INTRAVENOUS at 17:33

## 2019-05-18 RX ADMIN — MEROPENEM 1 G: 1 INJECTION, POWDER, FOR SOLUTION INTRAVENOUS at 01:48

## 2019-05-18 RX ADMIN — IPRATROPIUM BROMIDE AND ALBUTEROL SULFATE 1 AMPULE: .5; 3 SOLUTION RESPIRATORY (INHALATION) at 07:48

## 2019-05-18 RX ADMIN — POTASSIUM CHLORIDE 10 MEQ: 7.46 INJECTION, SOLUTION INTRAVENOUS at 07:43

## 2019-05-18 RX ADMIN — HYDROMORPHONE HYDROCHLORIDE 0.5 MG: 1 INJECTION, SOLUTION INTRAMUSCULAR; INTRAVENOUS; SUBCUTANEOUS at 20:22

## 2019-05-18 RX ADMIN — IPRATROPIUM BROMIDE AND ALBUTEROL SULFATE 1 AMPULE: .5; 3 SOLUTION RESPIRATORY (INHALATION) at 11:23

## 2019-05-18 RX ADMIN — ENOXAPARIN SODIUM 30 MG: 30 INJECTION SUBCUTANEOUS at 07:42

## 2019-05-18 ASSESSMENT — PAIN SCALES - GENERAL
PAINLEVEL_OUTOF10: 7
PAINLEVEL_OUTOF10: 8
PAINLEVEL_OUTOF10: 7
PAINLEVEL_OUTOF10: 8
PAINLEVEL_OUTOF10: 0
PAINLEVEL_OUTOF10: 0
PAINLEVEL_OUTOF10: 8
PAINLEVEL_OUTOF10: 0

## 2019-05-18 ASSESSMENT — PULMONARY FUNCTION TESTS
PIF_VALUE: 22
PIF_VALUE: 23
PIF_VALUE: 20
PIF_VALUE: 23
PIF_VALUE: 24
PIF_VALUE: 35
PIF_VALUE: 22
PIF_VALUE: 19
PIF_VALUE: 6
PIF_VALUE: 24
PIF_VALUE: 21
PIF_VALUE: 30
PIF_VALUE: 20
PIF_VALUE: 22
PIF_VALUE: 22
PIF_VALUE: 30
PIF_VALUE: 20
PIF_VALUE: 26
PIF_VALUE: 21
PIF_VALUE: 19

## 2019-05-18 NOTE — PROGRESS NOTES
[address] [facility]    PROGRESS NOTE             5/18/2019    8:37 AM    Name:   Yoselin Ayala  MRN:     [id number]     Acct:      [de-identified]   Room:   2002/2002-01  IP Day:  40  Admit Date:  4/11/2019  2:48 PM    PCP:  Les Flores DO  Code Status:  Full Code    Subjective:     C/C:   Chief Complaint   Patient presents with    Abdominal Pain     Interval History Status: not changed    Patient seen and examined, discussed with RN. Patient continues to have severe B/L upper extremity pitting edema. Lasix not used overnight due to lower pressures. Patient did require Levophed support to 2 this AM (was off since 1 AM). Patient is on low sedation due to pressures, patient tolerating well. Brief History:     POD#3 S/p open ashley, ex lap, R colectomy w/ ileocolic anastomosis, excision small bowel diverticulum, extensive enterolysis. Intubated on vent, low pressor requirement, worsening edema, on hydrocortisone and albumin support. Review of Systems:     Review of Systems   Unable to perform ROS: Intubated     Medications: Allergies:     Allergies   Allergen Reactions    Penicillins Shortness Of Breath and Rash    Amoxicillin        Current Meds:   Scheduled Meds:    furosemide  20 mg Intravenous BID    albumin human  50 g Intravenous Q8H    fat emulsion  100 mL Intravenous Daily    insulin lispro  0-6 Units Subcutaneous Q6H    vancomycin (VANCOCIN) intermittent dosing (placeholder)   Other RX Placeholder    vancomycin  750 mg Intravenous Q12H    hydrocortisone sodium succinate PF  100 mg Intravenous Q6H    sodium chloride flush  10 mL Intravenous 2 times per day    enoxaparin  30 mg Subcutaneous Daily    pantoprazole  40 mg Intravenous Daily    And    sodium chloride (PF)  10 mL Intravenous Daily    ipratropium-albuterol  1 ampule Inhalation Q4H WA    meperidine  25 mg Intravenous Once    metoprolol  5 mg Intravenous 8.6 05/18/2019     CBC with Differential:    Lab Results   Component Value Date    WBC 9.9 05/18/2019    RBC 3.13 05/18/2019    RBC 3.66 05/23/2012    HGB 9.0 05/18/2019    HCT 26.5 05/18/2019     05/18/2019     05/23/2012    MCV 84.8 05/18/2019    MCH 28.8 05/18/2019    MCHC 34.0 05/18/2019    RDW 15.5 05/18/2019    METASPCT 2 05/15/2019    LYMPHOPCT 3 05/18/2019    MONOPCT 3 05/18/2019    MYELOPCT 1 05/07/2019    BASOPCT 0 05/18/2019    MONOSABS 0.30 05/18/2019    LYMPHSABS 0.30 05/18/2019    EOSABS 0.00 05/18/2019    BASOSABS 0.00 05/18/2019    DIFFTYPE NOT REPORTED 05/18/2019     CMP:    Lab Results   Component Value Date     05/18/2019    K 3.1 05/18/2019     05/18/2019    CO2 24 05/18/2019    BUN 8 05/18/2019    CREATININE <0.40 05/18/2019    GFRAA CANNOT BE CALCULATED 05/18/2019    LABGLOM CANNOT BE CALCULATED 05/18/2019    GLUCOSE 154 05/18/2019    GLUCOSE 87 05/21/2012    PROT 5.2 05/18/2019    LABALBU 3.5 05/18/2019    LABALBU 4.6 05/17/2012    CALCIUM 7.8 05/18/2019    BILITOT 0.37 05/18/2019    ALKPHOS 62 05/18/2019    AST 13 05/18/2019    ALT 11 05/18/2019     BMP:    Lab Results   Component Value Date     05/18/2019    K 3.1 05/18/2019     05/18/2019    CO2 24 05/18/2019    BUN 8 05/18/2019    LABALBU 3.5 05/18/2019    LABALBU 4.6 05/17/2012    CREATININE <0.40 05/18/2019    CALCIUM 7.8 05/18/2019    GFRAA CANNOT BE CALCULATED 05/18/2019    LABGLOM CANNOT BE CALCULATED 05/18/2019    GLUCOSE 154 05/18/2019    GLUCOSE 87 05/21/2012     BUN/Creatinine:    Lab Results   Component Value Date    BUN 8 05/18/2019    CREATININE <0.40 05/18/2019     Hepatic Function Panel:    Lab Results   Component Value Date    ALKPHOS 62 05/18/2019    ALT 11 05/18/2019    AST 13 05/18/2019    PROT 5.2 05/18/2019    BILITOT 0.37 05/18/2019    BILIDIR 0.21 05/11/2019    IBILI 0.17 05/11/2019    LABALBU 3.5 05/18/2019    LABALBU 4.6 05/17/2012     Albumin:    Lab Results   Component Value Additional signs and symptoms: dyspnea FINDINGS: A right IJ catheter is seen with its tip terminating at the superior cavoatrial junction. The left chest wall pacemaker and leads are stable. The cardiomediastinal silhouette is stable. There is interval increased opacity at the right lung base, may be related to atelectasis versus pneumonia. There is small atelectasis and mild pleural effusion at the left lung base. There is no pneumothorax. There is no acute osseous abnormality. Interval increased opacity at the right lung base, may be related to atelectasis versus pneumonia. Small atelectasis and mild pleural effusion at the left lung, new since the prior study. Xr Chest Standard (2 Vw)    Result Date: 4/29/2019  EXAMINATION: TWO VIEWS OF THE CHEST 4/29/2019 8:04 pm COMPARISON: 04/27/2019 HISTORY: ORDERING SYSTEM PROVIDED HISTORY: Follow-up lung infiltrate TECHNOLOGIST PROVIDED HISTORY: Follow-up lung infiltrate Ordering Physician Provided Reason for Exam: Follow-up infiltrate. Pt unable to lean forward - unable to get proper sponge behind pt for lateral. Pt unable to raise left arm at all for lateral. Best possible lateral obtained. Acuity: Unknown Type of Exam: Unknown Additional signs and symptoms: Follow-up infiltrate. Pt unable to lean forward - unable to get proper sponge behind pt for lateral. Pt unable to raise left arm at all for lateral. Best possible lateral obtained. FINDINGS: Left chest cardiac pacemaker device is in place. Right IJ central venous catheter in place with distal tip at the cavoatrial junction. Heart size is within normal limits. No vascular congestion. There are small to moderate pleural effusions. There are interstitial opacities in the upper lungs, which could be related to scarring and/or developing infiltrate, slightly improved in appearance when compared to 04/27/2019. No evidence of pneumothorax.      1.  Small to moderate bilateral pleural effusions, better associated atelectasis. 1. Interval presumed suctioning of contrast from the stomach which now appears relatively empty. Continued progression of contrast through the small and large bowel as described. 2. NG tube side hole at the level of the GE junction, consider advancement of 2-3 cm. Xr Abdomen (kub) (single Ap View)    Result Date: 5/8/2019  EXAMINATION: SINGLE SUPINE XRAY VIEW(S) OF THE ABDOMEN 5/8/2019 8:59 am COMPARISON: CT abdomen from 05/05/2019, and KUB from 04/19/2019 HISTORY: ORDERING SYSTEM PROVIDED HISTORY: recheck obstruction TECHNOLOGIST PROVIDED HISTORY: recheck obstruction Ordering Physician Provided Reason for Exam: recheck obstruction Acuity: Unknown Type of Exam: Unknown FINDINGS: Again noted cholecystostomy tube, electrode leads from a pacemaker overlying the heart, and overlying electrode leads/tubing. NG tube no longer demonstrated. Gas-filled bowel loops without marked dilatation; cecum measures 8 cm, slightly increased as compared to the previous study. No obvious free air. No mass or organomegaly. Bones unchanged. Retrocardiac opacity, hyperinflated lungs and probable pleural effusions. NG tube removed. Gas-filled bowel more suggestive ileus; no clear cut obstruction. Cecum measures 8 cm. Cholecystostomy tube in unchanged position. Additional unchanged findings, as above. RECOMMENDATION: Continued clinical correlation and follow-up     Xr Abdomen (kub) (single Ap View)    Result Date: 4/19/2019  EXAMINATION: SINGLE SUPINE XRAY VIEW(S) OF THE ABDOMEN 4/19/2019 9:48 am COMPARISON: April 14, 2019 HISTORY: ORDERING SYSTEM PROVIDED HISTORY: pain TECHNOLOGIST PROVIDED HISTORY: pain Ordering Physician Provided Reason for Exam: Abdominal pain and distentsion Acuity: Acute Type of Exam: Initial Additional signs and symptoms: Abdominal pain and distentsion FINDINGS: Enteric tube with the tip within the stomach. Small left pleural effusion. Minimal right pleural effusion.  Mildly dilated loops of bowel. Nonspecific bowel gas pattern with mildly dilated loops of bowel. Xr Abdomen (kub) (single Ap View)    Result Date: 4/14/2019  EXAMINATION: SINGLE SUPINE XRAY VIEW(S) OF THE ABDOMEN 4/14/2019 7:39 am COMPARISON: CT abdomen and pelvis  film from 11 April 2019 HISTORY: [address] [facility] [address]: Abd Distention TECHNOLOGIST PROVIDED HISTORY: Abd Distention Ordering Physician Provided Reason for Exam: Abdominal distention. Pt was moving around in the bed. Best films at present time. Acuity: Acute Type of Exam: Initial Additional signs and symptoms: Abdominal distention. Pt was moving around in the bed. Best films at present time. FINDINGS: Portable view time stamped at 748 hours demonstrates an intestinal tube terminating in the midportion of a gaseous Spike dilated stomach. Densities are present over the stomach likely medication. Bipolar pacemaker is in situ with intact leads. Heart size is top-normal, stable. Gaseous distension of the stomach and loop of bowel in the upper mid abdomen is noted but there is gas and fecal material in the rectum. Gastric outlet obstruction or proximal small bowel partial obstruction is suspected. No free air is noted. Midline [place] is present over the pelvis likely a monitor or CT small bore catheter. Vascular calcification is present in the pelvis. Persistent preferential gaseous distension of the stomach although there is some gas in the upper abdomen and gas and fecal material present in the rectosigmoid. Findings suggest gastric outlet obstruction. Ct Abdomen W Contrast Additional Contrast? Radiologist Recommendation    Result Date: 5/5/2019  EXAMINATION: CT ABDOMEN WITH CONTRAST, 5/5/2019 3:38 pm TECHNIQUE: CT of the abdomen was performed with the administration of intravenous contrast. Multiplanar reformatted images are provided for review.  Dose modulation, iterative reconstruction, and/or weight based adjustment of the mA/kV was utilized to reduce the radiation dose to as low as reasonably achievable. COMPARISON: April 14, 2019 HISTORY: ORDERING SYSTEM PROVIDED HISTORY: Check for abscess/fluid collection around ashley tube site. TECHNOLOGIST PROVIDED HISTORY: Ordering Physician Provided Reason for Exam: Patient c/o abd pain around her ashley site and drainage tube. FINDINGS: Evaluation is limited by motion. Lower Chest: Moderate bilateral pleural effusions. Organs: Multiple liver hypodensities measuring up to 4 mm are incompletely characterized but in the absence of risk factors likely represent cysts requiring no specific follow-up. Cholecystostomy tube is in place. No adjacent focal drainable fluid collection. No pancreatic lesion. No splenomegaly. No adrenal lesion. No hydronephrosis. No renal lesion. GI/Bowel: Stomach is markedly distended. Swirled appearance of the mesentery is seen within the mid to lower abdomen with adjacent thickened loops of small bowel. Peritoneum/Retroperitoneum: Atherosclerotic calcification of the abdominal aorta without aneurysmal dilatation. No adenopathy. Bones/Soft Tissues: No acute soft tissue abnormality. Diffuse osteopenia. Lumbar spine degenerative changes. Bowel obstruction which is suspected to be caused by an internal hernia. Cholecystostomy tube is in place. No surrounding fluid collection. Nm Gi Blood Loss    Result Date: 5/3/2019  EXAMINATION: NUCLEAR MEDICINE GASTRIC BLEEDING STUDY 5/3/2019 TECHNIQUE: Following the intravenous injection of 23.8 mCi of 99 mTc-labeled RBC's, a flow study and standard images of the abdomen was obtained over a total period of 60 minutes COMPARISON: None. HISTORY: ORDERING SYSTEM PROVIDED HISTORY: GI BLEEDING TECHNOLOGIST PROVIDED HISTORY: Ordering Physician Provided Reason for Exam: GI bleeding Acuity: Unknown Type of Exam: Unknown FINDINGS: Activity is seen within the blood pool, including the great vessels, heart, liver, and spleen. There was no abnormal accumulation of radioactivity in the abdomen to suggest active bleeding during study acquisition. No evidence of active GI bleeding during acquisition. RECOMMENDATIONS: If the patient shows hemodynamic signs of an active bleed in the next 20 hours, additional images can be acquired. Kate Negrete Device Plmt/replace/removal    Result Date: 4/30/2019  PROCEDURE: ULTRASOUND GUIDED VASCULAR ACCESS. FLUOROSCOPY GUIDED PICC PLACEMENT 4/30/2019. HISTORY: ORDERING SYSTEM PROVIDED HISTORY: TPN TECHNOLOGIST PROVIDED HISTORY: TPN Lumen?->Double Lumen SEDATION: None FLUOROSCOPY DOSE AND TYPE OR TIME AND EXPOSURES: 7 seconds; D  TECHNIQUE: This procedure was performed by Dr. Henry Cazares. Informed consent was obtained after a detailed explanation of the procedure including risks, benefits, and alternatives. Universal protocol was observed. The right arm was prepped and draped in sterile fashion using maximum sterile barrier technique. Local anesthesia was achieved with lidocaine. A micropuncture needle was used to access the right basilic vein using ultrasound guidance. An ultrasound image demonstrating patency of the vein with needle tip located within it. An image was obtained and stored in PACs. A 0.018 guidewire was used to place a peel-a-way sheath and a 5 [place] dual-lumen PICC was advanced with fluoroscopic guidance with the tip at the cavo-atrial junction. The catheter flushed easily and there was a good blood return. The catheter was secured to the skin. The patient tolerated the procedure well and there were no immediate complications. FINDINGS: Fluoroscopic image demonstrates the tip of the catheter at the cavo-atrial junction.      Successful ultrasound and fluoroscopy guided PICC placement     Us Gallbladder Ruq    Result Date: 4/19/2019  EXAMINATION: RIGHT UPPER QUADRANT ULTRASOUND 4/19/2019 10:48 am COMPARISON: CT abdomen and pelvis 4/14/2018 HISTORY: [address] structures are stable. Pleural effusions are present with bibasilar atelectasis. Bilateral lung infiltrates are present in the upper lung fields. Persistent pleural effusions with bibasilar atelectasis and bilateral lung infiltrates with areas of consolidation developing in the upper lobes. Xr Chest Portable    Result Date: 4/26/2019  EXAMINATION: SINGLE XRAY VIEW OF THE CHEST 4/26/2019 6:51 am COMPARISON: April 24, 2019 HISTORY: ORDERING SYSTEM PROVIDED HISTORY: Pleural effusions TECHNOLOGIST PROVIDED HISTORY: Pleural effusions Ordering Physician Provided Reason for Exam: pleural effusion Acuity: Acute Type of Exam: Initial FINDINGS: Right IJ line in good position. Pacer device is stable. Cardiac leads overlie the chest.  Stable cardiomediastinal contours with calcified aortic arch. Left effusion and associated airspace disease, slightly decreased. Chronic blunting of right costophrenic sulcus may represent scarring or chronic effusion. Increased reticular markings right upper chest and left upper chest possibly interstitial edema or interstitial pneumonia. No new airspace consolidation. Increasing reticular markings upper chest bilaterally right greater than left possibly due to edema or interstitial pneumonitis. Improving left effusion with associated retrocardiac airspace disease. Pleural-parenchymal scarring or effusion in the right lung base, stable. Xr Chest Portable    Result Date: 4/24/2019  EXAMINATION: SINGLE XRAY VIEW OF THE CHEST 4/24/2019 6:05 am COMPARISON: Chest radiograph dated 04/22/2019 HISTORY: ORDERING SYSTEM PROVIDED HISTORY: Acute respiratory failure, pleural effusion TECHNOLOGIST PROVIDED HISTORY: Acute respiratory failure, pleural effusion Ordering Physician Provided Reason for Exam: pleural effusion, respiratory failure. Acuity: Acute Type of Exam: Initial FINDINGS: Heart size is normal.  Dual-chamber pacemaker placed via left subclavian approach.   Right internal jugular central line has tip in distal superior vena cava. Interval removal of nasogastric tube. No interval change of infiltrate and atelectasis at the left lung base. Stable mild infiltrate in the left upper lobe. Mild interval improvement of infiltrate at the right lung base. Stable small bilateral pleural effusions, left larger than right. Mild CHF, stable. 1.  No interval change of infiltrate and atelectasis at the left lung base. Stable mild infiltrate in the left upper lobe. 2.  Mild interval improvement of infiltrate at the right lung base. 3.  Stable small bilateral pleural effusions, left larger than right. 4.  Mild CHF, stable. Xr Chest Portable    Result Date: 4/22/2019  EXAMINATION: SINGLE XRAY VIEW OF THE CHEST 4/22/2019 6:44 am COMPARISON: April 21, 2019. HISTORY: ORDERING SYSTEM PROVIDED HISTORY: Hypoxia and CHF TECHNOLOGIST PROVIDED HISTORY: Hypoxia and CHF Ordering Physician Provided Reason for Exam: hx of hypoxia/CHF Acuity: Acute Type of Exam: Initial FINDINGS: Right IJ central venous catheter appears in unchanged position. Nasogastric tube courses below the diaphragm, with tip not imaged. Left chest wall pacemaker device projects in unchanged position. Cardiac and mediastinal contours are enlarged but unchanged. Unchanged bilateral pleural effusions and bibasilar pulmonary opacities. Unchanged findings suggestive of congestive heart failure. No evidence of pneumothorax. No new osseous abnormalities. 1. No significant change in findings suggestive of congestive heart failure. 2. Unchanged bilateral pleural effusions and bibasilar pulmonary consolidations. RECOMMENDATION: Radiographic follow-up to complete resolution.      Xr Chest Portable    Result Date: 4/21/2019  EXAMINATION: SINGLE XRAY VIEW OF THE CHEST 4/21/2019 3:08 pm COMPARISON: April 19, 2019 HISTORY: ORDERING SYSTEM PROVIDED HISTORY: hypoxia TECHNOLOGIST PROVIDED HISTORY: hypoxia Ordering Physician Provided Reason for Exam: hypoxia Acuity: Unknown Type of Exam: Unknown FINDINGS: Enteric tube in the stomach. ET tube is been removed. Right IJ catheter terminates in the atrial caval junction. Bipolar pacer on the left unchanged. Heart and mediastinum unremarkable. Moderate edema unchanged. Small effusions, left greater than right. Bony thorax intact. Status post extubation. Life support apparatus otherwise stable. Moderate edema and effusions unchanged. Xr Chest Portable    Result Date: 4/19/2019  EXAMINATION: SINGLE XRAY VIEW OF THE CHEST 4/19/2019 5:51 am COMPARISON: April 18, 2019 HISTORY: ORDERING SYSTEM PROVIDED HISTORY: ETT placement TECHNOLOGIST PROVIDED HISTORY: ETT placement Ordering Physician Provided Reason for Exam: Vent Pt check ETT placement Acuity: Acute Type of Exam: Subsequent/Follow-up Additional signs and symptoms: Vent Pt check ETT placement FINDINGS: Tubes and lines are unchanged. Persistent bibasilar lung opacities and pleural effusions. Mild pulmonary edema. No significant interval change. Xr Chest Portable    Result Date: 4/18/2019  EXAMINATION: SINGLE XRAY VIEW OF THE CHEST 4/18/2019 6:21 am COMPARISON: April 17, 2019 HISTORY: ORDERING SYSTEM PROVIDED HISTORY: ETT placement TECHNOLOGIST PROVIDED HISTORY: ETT placement Ordering Physician Provided Reason for Exam: on vent Acuity: Acute Type of Exam: Initial FINDINGS: Right IJ line and NG tube are stable. Interval advancement of ET tube terminating 3.1 cm from ron. Implanted cardiac device is present. Left-sided effusion and associated airspace disease is stable. Hazy right basilar opacity possibly edema or atelectasis. No pneumothorax. Increased opacity left upper chest possibly focal area of atelectasis, new from prior. Advanced ETT from prior exam, now 3.1 cm from ron. Increased opacity left upper chest suspicious for atelectasis. Additional supporting devices are stable.      Xr Chest Portable    Result Date: 4/17/2019  EXAMINATION: SINGLE XRAY VIEW OF THE CHEST 4/17/2019 7:15 am COMPARISON: 16 April 2019 HISTORY: ORDERING SYSTEM PROVIDED HISTORY: ETT placement TECHNOLOGIST PROVIDED HISTORY: ETT placement Ordering Physician Provided Reason for Exam: on vent Acuity: Acute Type of Exam: Initial FINDINGS: AP portable view of the chest time stamped at 613 hours demonstrates overlying cardiac monitoring electrodes. A right internal jugular catheter terminates in the superior vena cava. Endotracheal tube is somewhat high riding terminating 6.4 cm above the ron. Intestinal tube extends beyond the body of the stomach, tip not included on the exam.  Bipolar pacemaker enters from the left with intact leads in appropriate positions. Heart size is normal.  Left pleural effusion is noted there is continued volume loss in the left hemithorax with stable mild vascular congestion, perihilar opacities, and faint opacity in the right mid and lower lung field. Underlying COPD is noted. Osseous structures are stable. There is little change from prior study. Findings of COPD, mild central vascular congestion, left effusion, and scattered opacities favoring interstitial edema with multifocal pneumonitis not excluded. Tubes and lines as above. However, endotracheal tube is high riding. The findings were sent to the Radiology Results [address] at 7:58 am on 4/17/2019to be communicated to a licensed caregiver. RECOMMENDATION: Suggest advancement of endotracheal tube.      Xr Chest Portable    Result Date: 4/16/2019  EXAMINATION: SINGLE XRAY VIEW OF THE CHEST 4/16/2019 6:54 am COMPARISON: 04/15/2019, 610 hours HISTORY: ORDERING SYSTEM PROVIDED HISTORY: ETT placement TECHNOLOGIST PROVIDED HISTORY: ETT placement Ordering Physician Provided Reason for Exam: on vent Acuity: Acute Type of Exam: Initial 78-year-old female on ventilator; check endotracheal tube placement FINDINGS: Portable AP upright view of the chest. Endotracheal tube distal tip overlying the mid trachea approximately 4.1 cm above the level of the ron. Enteric tube traverses the GE junction with distal tip excluded from the field of view. Left subclavian approach cardiac pacemaker device distal lead tips relatively stable in position. Right internal jugular approach central venous catheter distal tip overlying the high right atrium, stable. Cardiac monitor leads overlie the chest. Atherosclerotic calcification of the thoracic aorta. Slight stable volume loss of the left hemithorax. No pneumothorax. No free air. Dense retrocardiac/left basilar airspace consolidation and small left-sided pleural effusion. Stable mild focal opacity at the right mid lung zone. Underlying COPD. Stable mild pulmonary vascular congestion and left-sided predominant parahilar opacity. Visualized osseous structures remain unchanged. 1. Stable multifocal airspace disease as detailed above with dense retrocardiac/left basilar airspace consolidation and small left-sided pleural effusion. Mild pulmonary vascular congestion. Findings may represent edema or multifocal pneumonia. 2. Underlying COPD. 3. Tubes and line as detailed above. Xr Chest Portable    Result Date: 4/15/2019  EXAMINATION: SINGLE XRAY VIEW OF THE CHEST 4/15/2019 6:47 am COMPARISON: 14 April 2019 HISTORY: ORDERING SYSTEM PROVIDED HISTORY: ETT placement TECHNOLOGIST PROVIDED HISTORY: ETT placement Ordering Physician Provided Reason for Exam: on vent Acuity: Acute Type of Exam: Initial FINDINGS: AP portable view of the chest time stamped at 612 hours demonstrates overlying cardiac monitoring electrodes. Endotracheal tube terminates 4 cm above the ron. Bipolar pacemaker enters from the left with intact leads in appropriate positions. Intestinal tube extends beyond the fundus of the stomach, tip not included. Right internal jugular catheter terminates at the cavoatrial junction.   Heart size is normal. !None      ! +------------------------------------+----------+---------------+----------+ ! Dist SCV                            ! Yes       ! Yes            ! None      ! +------------------------------------+----------+---------------+----------+ ! Prox Axillary                       ! Yes       ! Yes            ! None      ! +------------------------------------+----------+---------------+----------+ ! Dist Axillary                       ! Yes       ! Yes            ! None      ! +------------------------------------+----------+---------------+----------+ ! Prox Brachial                       !Yes       ! Yes            ! None      ! +------------------------------------+----------+---------------+----------+ ! Dist Brachial                       !Yes       ! Yes            ! None      ! +------------------------------------+----------+---------------+----------+ ! Prox Radial                         !Yes       ! Yes            ! None      ! +------------------------------------+----------+---------------+----------+ ! Dist Radial                         !Yes       ! Yes            ! None      ! +------------------------------------+----------+---------------+----------+ ! Prox Ulnar                          ! Yes       ! Yes            ! None      ! +------------------------------------+----------+---------------+----------+ ! Dist Ulnar                          ! Yes       ! Yes            ! None      ! +------------------------------------+----------+---------------+----------+ ! Basilic at [facility]A                       ! Yes       ! Yes            ! None      ! +------------------------------------+----------+---------------+----------+ ! Basilic at AF                       ! Yes       ! Yes            ! None      ! +------------------------------------+----------+---------------+----------+ ! Yessenia at [address]                       ! Yes       ! Yes            ! None      ! +------------------------------------+----------+---------------+----------+ ! Gideon at [facility]                      ! Yes       ! Yes            ! None      ! +------------------------------------+----------+---------------+----------+ ! Cephalic at AF                      ! Yes       ! Yes            ! None      ! +------------------------------------+----------+---------------+----------+ ! Cephalic at [address]                      ! Yes       ! Yes            ! None      ! +------------------------------------+----------+---------------+----------+ Doppler Measurements +-------------------------+-----------------------+------------------------+ ! Location                 ! Signal                 !Reflux                  ! +-------------------------+-----------------------+------------------------+ ! SCV                      ! Phasic                 ! No                      ! +-------------------------+-----------------------+------------------------+ ! Axillary                 ! Phasic                 ! No                      ! +-------------------------+-----------------------+------------------------+ ! Brachial                 !Phasic                 ! No                      ! +-------------------------+-----------------------+------------------------+ Left UE Vein Measurements 2D Measurements +------------------------------------+----------+---------------+----------+ ! Location                            ! Visualized! Compressibility! Thrombosis! +------------------------------------+----------+---------------+----------+ ! Prox IJV                            ! Yes       ! Yes            ! None      ! +------------------------------------+----------+---------------+----------+ ! Dist IJV                            ! Yes       ! Yes            ! None      ! +------------------------------------+----------+---------------+----------+ ! Prox SCV                            ! Yes       ! Yes            ! None      ! +------------------------------------+----------+---------------+----------+ ! Dist SCV                            ! Yes !Reflux                  ! +-------------------------+-----------------------+------------------------+ ! IJV                      ! Phasic                 !                        ! +-------------------------+-----------------------+------------------------+ ! SCV                      ! Phasic                 !                        ! +-------------------------+-----------------------+------------------------+ ! Axillary                 ! Phasic                 !                        ! +-------------------------+-----------------------+------------------------+ ! Brachial                 !Phasic                 !                        ! +-------------------------+-----------------------+------------------------+    Vl Dup Lower Extremity Venous Bilateral    Result Date: 5/7/2019    [facility]  Vascular Lower Extremities DVT Study Procedure   Patient Name   [facility]     Date of Study           05/07/2019                 Theodis Bloch   Date of Birth  1950  Gender                  Female   Age            77 year(s)  Race                       Room Number    9093        Height:                 59.84 inch, 152 cm   Corporate ID # [id number]    Weight:                 102 pounds, 46.3 kg   Patient Acct # [de-identified]   BSA:        1.4 m^2     BMI:      20.03 kg/m^2   MR #           [id number]      Sonographer             Jamel Islas, T   Accession #    [id number]   Interpreting Physician  Haider Smallwood   Referring                  Referring Physician     Jerrica Mendez MD  Nurse  Practitioner  Procedure Type of Study:   Veins: Lower Extremities DVT Study, Venous Scan Lower Bilateral.  Indications for Study:Pain and swelling. Patient Status: In Patient. Technical Quality:Limited visualization. Limitation reason:patient movement. Conclusions   Summary   No evidence of superficial or deep venous thrombosis in both lower  extremities. Technically limited visualization.    Signature +------------------------------------+----------+---------------+----------+ ! Dist Femoral                        !Yes       ! Yes            ! None      ! +------------------------------------+----------+---------------+----------+ ! Deep Femoral                        !Yes       ! Yes            ! None      ! +------------------------------------+----------+---------------+----------+ ! Popliteal                           !Yes       ! Yes            ! None      ! +------------------------------------+----------+---------------+----------+ ! Sapheno Femoral Junction            ! Yes       ! Yes            ! None      ! +------------------------------------+----------+---------------+----------+ ! PTV                                 ! Partial   !Yes            ! None      ! +------------------------------------+----------+---------------+----------+ ! Peroneal                            !Partial   !Yes            ! None      ! +------------------------------------+----------+---------------+----------+ ! Gastroc                             ! Yes       ! Yes            ! None      ! +------------------------------------+----------+---------------+----------+ ! GSV Thigh                           ! Yes       ! Yes            ! None      ! +------------------------------------+----------+---------------+----------+ ! GSV Knee                            ! Yes       ! Yes            ! None      ! +------------------------------------+----------+---------------+----------+ ! GSV Ankle                           ! Yes       ! Yes            ! None      ! +------------------------------------+----------+---------------+----------+ ! SSV                                 ! Partial   !Yes            ! None      ! +------------------------------------+----------+---------------+----------+ Left Lower Extremities DVT Study Measurements Left 2D Measurements +------------------------------------+----------+---------------+----------+ ! Location !Visualized! Compressibility! Thrombosis! +------------------------------------+----------+---------------+----------+ ! Common Femoral                      !Yes       ! Yes            ! None      ! +------------------------------------+----------+---------------+----------+ ! Prox Femoral                        !Yes       ! Yes            ! None      ! +------------------------------------+----------+---------------+----------+ ! Mid Femoral                         !Yes       ! Yes            ! None      ! +------------------------------------+----------+---------------+----------+ ! Dist Femoral                        !Yes       ! Yes            ! None      ! +------------------------------------+----------+---------------+----------+ ! Deep Femoral                        !Yes       ! Yes            ! None      ! +------------------------------------+----------+---------------+----------+ ! Popliteal                           !Yes       ! Yes            ! None      ! +------------------------------------+----------+---------------+----------+ ! Sapheno Femoral Junction            ! Yes       ! Yes            ! None      ! +------------------------------------+----------+---------------+----------+ ! PTV                                 ! Partial   !Yes            ! None      ! +------------------------------------+----------+---------------+----------+ ! Peroneal                            !Partial   !Yes            ! None      ! +------------------------------------+----------+---------------+----------+ ! Gastroc                             ! Yes       ! Yes            ! None      ! +------------------------------------+----------+---------------+----------+ ! GSV Thigh                           ! Yes       ! Yes            ! None      ! +------------------------------------+----------+---------------+----------+ ! GSV Knee                            ! Yes       ! Yes            ! None      ! +------------------------------------+----------+---------------+----------+ ! GSV Ankle                           ! Yes       ! Yes            ! None      ! +------------------------------------+----------+---------------+----------+ ! SSV                                 ! Partial   !Yes            ! None      ! +------------------------------------+----------+---------------+----------+    Ir Cholecystostomy Percutaneous Complete    Result Date: 4/22/2019  PROCEDURE: ULTRASOUND and fluoroscopic GUIDED CHOLECYSTOSTOMY TUBE PLACEMENT April 22, 2019 HISTORY: ORDERING SYSTEM PROVIDED HISTORY: acute cholecystitis TECHNOLOGIST PROVIDED HISTORY: acute cholecystitis SEDATION: 75 mcg IV fentanyl for pain TECHNIQUE: Informed consent was obtained after a detailed explanation of the procedure including risks, benefits, and alternatives. Universal protocol was followed. A suitable skin site was prepped and draped in sterile fashion following ultrasound localization. An 18 gauge needle was advanced under ultrasound guidance into the gallbladder via transhepatic route and a 0.035 guidewire was used to place a 8 [place] cholecystostomy tube under fluoroscopic guidance. The catheter was sutured to the skin and the patient tolerated the procedure well. Thick bilious material was sent for laboratory analysis. The catheter was attached to gravity drainage. FINDINGS: Ultrasound image demonstrates distended gallbladder. Bilious fluid was sent for culture. Successful placement of transhepatic 8 Belgian percutaneous cholecystostomy tube. Nm Hepatobiliary Scan W Ejection Fraction    Result Date: 4/20/2019  EXAMINATION: NUCLEAR MEDICINE HEPATOBILIARY SCINTIGRAPHY (HIDA SCAN). 4/20/2019 2:15 pm TECHNIQUE: Approximately 5.6 [id number] Mebrofenin (Choletec) was administered IV. Then, dynamic images of the abdomen were obtained in the anterior projection for 60 mins. A right lateral view was also obtained at 60 mins.  Delayed images up to 4 hours were obtained. COMPARISON: Ultrasound 04/19/2019 HISTORY: ORDERING SYSTEM PROVIDED HISTORY: CHOLECYSTITIS TECHNOLOGIST PROVIDED HISTORY: Ordering Physician Provided Reason for Exam: Cholecystitis Acuity: Unknown Type of Exam: Unknown FINDINGS: Prompt, homogenous uptake by the liver is noted with normal appearance of radiotracer excretion into the biliary system. Clearance of bloodpool activity appears appropriate. Normal small bowel activity is seen. Prominent activity within the common bile duct. The gallbladder is not visualized by the end of the exam.     Absent filling of the gallbladder consistent with acute cholecystitis. Fl Small Bowel Follow Through Only    Result Date: 5/10/2019  EXAMINATION: SMALL BOWEL FOLLOW THROUGH SERIES 5/9/2019 TECHNIQUE: Small bowel follow through series was performed with overhead images. FLUOROSCOPY DOSE AND TYPE OR TIME AND EXPOSURES: No fluoroscopic images obtained. COMPARISON: CT abdomen pelvis 05/05/2019 HISTORY: ORDERING SYSTEM PROVIDED HISTORY: internal hernia TECHNOLOGIST PROVIDED HISTORY: Water soluble contrast only. Study to be done ONLY if NGT is placed by IR internal hernia FINDINGS:  image demonstrates NG tube overlying the left side of the abdomen within the stomach. Surgical drain overlies the right side of the abdomen. There is a nonspecific bowel gas pattern with mild dilated loops of bowel in lower abdomen. Initial images demonstrate filling of the stomach. At 1 hour and 45 minutes no significant contrast is noted in the small bowel. The 4-1/2 hour image demonstrates contrast within loops of bowel within the mid and lower abdomen and pelvis. There appears to be contrast extending to the colon in the right side of the abdomen. Contrast is noted within the pelvis which may be within the bladder or rectum. Significant delay in emptying of the stomach with persistent contrast noted at the 6 hour concha.  There is contrast extending into the bowel which appears to be in the colon. Subtle findings are limited. Consider follow-up radiograph of the abdomen in 6-8 hours. Ir Place Ng Tube By Dr Lisy Leung    Result Date: 5/9/2019  PROCEDURE: XR PLACE NASOGASTRIC TUBE PHYS 5/9/2019 HISTORY: ORDERING SYSTEM PROVIDED HISTORY: ileus TECHNOLOGIST PROVIDED HISTORY: ileus Ordering Physician Provided Reason for Exam: ILEUS Acuity: Unknown Type of Exam: Unknown CONTRAST: None SEDATION: None FLUOROSCOPY DOSE AND TYPE OR TIME AND EXPOSURES: 21 seconds; D AP 46 DESCRIPTION OF PROCEDURE AND FINDINGS: This procedure was performed by Dr. Talat Meade. Under intermittent fluoroscopic guidance a 12 Greek nasogastric tube was placed through the right nostril and directed under fluoroscopy into the stomach. A small amount of air was injected confirming the tip location in the stomach. Partially visualized cholecystostomy tube and pacer wires. Tube was secured in place to the patient's nose with an adhesive dressing. 12 Greek nasogastric tube placed successfully with its tip in the stomach. Physical Examination:        Physical Exam   Constitutional: No distress. Low Sedated, Levophed 2, does not appear in acute distress, no agitation   HENT:   Head: Normocephalic and atraumatic. Eyes: Pupils are equal, round, and reactive to light. Conjunctivae are normal. Right eye exhibits no discharge. Neck: Neck supple. Cardiovascular: Regular rhythm, normal heart sounds and intact distal pulses. Pulmonary/Chest: Effort normal. No respiratory distress. She has no wheezes. She has rales (basilar). She exhibits no tenderness. Low PEEP support, not in resp distress   Abdominal: Soft. Bowel sounds are normal. There is no tenderness. Dressing C/D/I   Musculoskeletal: She exhibits edema (B/L upper extremities, up to elbows, worsened, +4). She exhibits no tenderness. Skin: Skin is warm and dry. Capillary refill takes less than 2 seconds. Vitals reviewed.     Vitals: 05/18/19 0615 05/18/19 0630 05/18/19 0645 05/18/19 0750   BP: (!) 123/39 (!) 118/46 (!) 103/42    Pulse: 103 98 93 92   Resp: 20 23 16 19   Temp:       TempSrc:       SpO2: 97% 97% 96% 100%   Weight:       Height:           Assessment:        Primary Problem  Acute respiratory failure [facility])    Active Hospital Problems    Diagnosis Date Noted    Acute respiratory failure ([facility] [address] [J96.00] 05/18/2019    Small bowel obstruction ([facility] [address] [K56.609]     Anxiety disorder [F41.9]     Noncompliance [Z91.19]     Anemia [D64.9]     GI bleed [K92.2] 05/03/2019    Hemorrhage of rectum and anus [K62.5]     Centrilobular emphysema ([facility] [address] [J43.2] 04/30/2019    CRP elevated [R79.82]     Elevated procalcitonin [R79.89]     Bandemia [D72.825]     Cholecystitis [K81.9]     Abdominal distention [R14.0]     Elevated CEA [R97.0]     Elevated CA 19-9 level [R97.8]     Urinary retention [R33.9]     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]     Septic shock (HCC) [A41.9, R65.21]     Leukemoid reaction [D72.823]     Aspiration pneumonia of right lower lobe due to vomit ([facility] [address] [J69.0]     MRSA carrier [Z22.322]     Sepsis due to urinary tract infection ([facility] [address] [A41.9, N39.0] 04/11/2019    Cystitis [N30.90] 04/11/2019       Plan:        Acute respiratory failure s/p ex-lap, open cholecystectomy, R colectomy w/ ileocolic anastomosis, extensive enterolysis, POD#3  - Pulm following - intubated on vent  - RT following - no weaning trial this AM, pressor back on  - Levophed 2  - Versed for sedation (2/2 BP)  - Albumin 50g q8h  - Lasix 20mg IV after Albumin if SBP > 100 (MAP > 65) - severe B/L symmetric UE edema (4+), worsened  - CXR - mild B/L congestion, bibasilar effusions L>R  - Total fluids at 60ml  - Solu-Cortef 100mg q6h  - Surgery following  - ID Following - Merrem day 9, Vanco day 6     Severe Anemia 2/2 ABLA vs. AOCD  - Hgb 9.0 (from 6.9 on 5/17), s/p 2 units on 5/17, appears stable, continue to follow    DVT PPx  - Lovenox 30mg qd Dispo  - PT/OT  [address] for rehab, Kari Engle MD  5/18/2019  8:37 AM       IM Attending    Pt seen and examined today   I have discussed the care of pt , including pertinent history and exam findings,  with the resident. I have reviewed the key elements of all parts of the encounter with the resident. I agree with the assessment, plan and orders as documented by the resident.       Pt remain vented   Cont iv antb   Cont TPN   Electronically signed by Alberto Paris MD on 5/18/2019 at 12:56 PM

## 2019-05-18 NOTE — PLAN OF CARE
Problem: Falls - Risk of:  Goal: Absence of physical injury  Description  Absence of physical injury  5/17/2019 1914 by Osmar Beltran RN  Outcome: Met This Shift     Problem: Tissue Perfusion, Altered:  Goal: Circulatory function within specified parameters  Description  Circulatory function within specified parameters  5/17/2019 1914 by Osmar Beltran RN  Outcome: Met This Shift     Problem: Risk for Impaired Skin Integrity  Goal: Tissue integrity - skin and mucous membranes  Description  Structural intactness and normal physiological function of skin and  mucous membranes. 5/18/2019 0657 by Jayant More RN  Outcome: Ongoing  Note:   Patient turned and repositioned every 2 hours and as needed for comfort. Skin assessment as charted. No new skin breakdown noted. 5/17/2019 1914 by Osmar Beltran RN  Outcome: Met This Shift     Problem: Falls - Risk of:  Goal: Will remain free from falls  Description  Will remain free from falls  5/18/2019 0657 by Jayant More RN  Outcome: Ongoing  5/17/2019 1914 by Osmar Beltran RN  Outcome: Met This Shift     Problem: Infection, Septic Shock:  Goal: Will show no infection signs and symptoms  Description  Will show no infection signs and symptoms  5/18/2019 0657 by Jayant More RN  Outcome: Ongoing  Note:   No temps noted throughout shift  5/17/2019 1914 by Osmar Beltran RN  Outcome: Met This Shift     Problem: Pain:  Goal: Pain level will decrease  Description  Pain level will decrease  5/18/2019 0657 by Jayant More RN  Outcome: Ongoing  Note:   Pain assessed at regular intervals. Medications administered as requested for comfort. Pain remains at a tolerable level.    5/17/2019 1914 by Osmar Beltran RN  Outcome: Ongoing     Problem: Nutrition  Goal: Optimal nutrition therapy  Description       5/17/2019 1914 by Osmar Beltran RN  Outcome: Ongoing     Problem: Restraint Use - Nonviolent/Non-Self-Destructive Behavior:  Goal: Absence of restraint-related injury  Description  Absence of restraint-related injury    5/18/2019 0657 by Raulito Patterson RN  Outcome: Ongoing  Note:   Restraints released and ROM performed every 2 hours and PRN. No injury noted.    5/17/2019 1914 by Hellen Cash RN  Outcome: Met This Shift

## 2019-05-18 NOTE — PROGRESS NOTES
Infectious disease Consult Note      Patient: Lane Lawler  : 1950  Acct#:  [id number]     Date:  2019    Assessment:   Leukocytosis improving . Aspiration pneumonia . Shock  . Cholecystitis status post cholecystectomy tube  grew Candida non-albicans and one colony of ESBL Klebsiella on culture . finished ABXS course   SBO S/p right colectomy with ileocolic anastomosis,open cholecystectomy and excision of small bowel diverticulum 5/15 . Single positive blood culture on 5/10 STAPHYLOCOCCUS SPECIES, COAGULASE NEGATIVE likely contamination     Ecoli Emphysematous cystitis initially treatedtreated     Aspiration pneumonia of right lower lobe initially     Yeast growth on sputum culture suspect contamination     MRSA carrier    Urinary retention     Anemia GI bleed followed by GI     PCN allergy      History of CVA with left hemiparesis              Recommendations:     Continue IV meropenem d/c IV Vancomycin . Follow blood cultures   Sputum culture not done  Follow CBC and renal function  . Continue supportive care . Subjective:       History of Present Illness  Patient is a 77 y.o.  female admitted with Sepsis due to urinary tract infection   who is seen in consult for the same . She presented w dysuria and lower abd pain for around a week PRIOR TO ADMISSION associated with vomiting ,was found hypotensive with leukocytosis . CT suggested emphysematous cystitis,possible gastric outlet obstruction. CXR showed a right lower infiltrate. High CA-19-9,CEA  Allergy to [facility] w SOB   Urine culture  grew ESCHERICHIA COLI resistant to Ampicillin ,sensitive to all other tested ABXS . Blood cultures negative . Mycoplasma IGM 0.97   YEAST MODERATE GROWTH on sputum culture   S/P EGD   with reported esophagitis. GB US Findings suggesting acute cholecystitis. HIIDA showed absent gallbladder filling.    She was extubated on   Status post cholecystectomy tube  by interventional radiology,  cultures on 4/22 grew Candida non-albicans and one colony of ESBL Klebsiella. PICC line was placed   Tagged RBC scan  was negative . CT abdomen 5/5 showed no fluid collection ,Bowel obstruction which is suspected to be caused by an internal hernia. NG tube was placed under fluoroscopy 5/9. She had a small bowel follow-through 5/9, developed respiratory failure with hypoxia, tachycardia and tachypnea was transferred to ICU placed on BiPAP, blood pressure on the low side required Levophed,WBC up to 28.5    code status was changed to [facility] on 5/13. S/p right colectomy with ileocolic anastomosis,open cholecystectomy and excision of small bowel diverticulum 5/15 . Interval history :  She is still intubated ,WBC down to nl  Off   Levophed now. On TPN   CXR reviewed and change. Past Medical History:   Diagnosis Date    Abnormal computed tomography of cervical spine     sclerotic bone appearance     CVA (cerebral vascular accident) ([address]     left  side weakness    GERD (gastroesophageal reflux disease)     Hypertension     Paraproteinemia     Weight loss       Past Surgical History:   Procedure Laterality Date    CHOLECYSTECTOMY N/A 5/15/2019    CHOLECYSTECTOMY performed by Qiana Beasley DO at [facility]  4/14/2019         LAPAROSCOPY N/A 5/15/2019    LAPAROSCOPY EXPLORATORY CONVERTED TO EXPLORATORY LAPAROTOMY/ RIGHT COLON RESECTION AND ANASTAMOSIS/ OPEN CHOLECYSTECTOMY/ EXTENSIVE LYSIS OF ADHESIONS performed by Qiana Beasley DO at [facility] 10  07/2011    Pacemaker is Medtronic Revo (compatible). Leads placed in 1995 are NOT MRI compatible. Placed at [facility]. V's per Dr. Saenz Co can not have an MRI.  UPPER GASTROINTESTINAL ENDOSCOPY N/A 4/16/2019    EGD ESOPHAGOGASTRODUODENOSCOPY @ BEDSIDE  ICU 2002 performed by Floridalma Taylor MD at [id number]          Admission Meds  No current facility-administered medications on file prior to encounter. Current Outpatient Medications on File Prior to Encounter   Medication Sig Dispense Refill    oxyCODONE-acetaminophen (PERCOCET) 5-325 MG per tablet Take 1 tablet by mouth every 6 hours as needed for Pain.  senna-docusate (PERICOLACE) 8.6-50 MG per tablet Take 1 tablet by mouth 2 times daily      budesonide-formoterol (SYMBICORT) 160-4.5 MCG/ACT AERO Inhale 2 puffs into the lungs 2 times daily      sucralfate (CARAFATE) 1 GM/10ML suspension Take 1 g by mouth 4 times daily       traZODone (DESYREL) 50 MG tablet Take 50 mg by mouth nightly      tiZANidine (ZANAFLEX) 2 MG tablet Take 2 mg by mouth every 8 hours as needed (left knee)       aspirin 81 MG tablet Take 81 mg by mouth daily      clopidogrel (PLAVIX) 75 MG tablet TAKE 1 TABLET DAILY 30 tablet 2    DOCQLACE 100 MG capsule TAKE 1 CAPSULE BY MOUTH IN THE MORNING & IN THE EVENING -DRINK PLENTYOF WATER WHILE TAKING THIS MEDICINE 30 capsule 1    amLODIPine (NORVASC) 10 MG tablet Take 10 mg by mouth daily.  LORazepam (ATIVAN) 0.5 MG tablet Take 0.5 mg by mouth every 6 hours as needed for Anxiety.  therapeutic multivitamin-minerals (THERAGRAN-M) tablet Take 1 tablet by mouth daily.  omeprazole (PRILOSEC) 20 MG capsule Take 20 mg by mouth daily.  venlafaxine (EFFEXOR) 37.5 MG tablet Take 37.5 mg by mouth 3 times daily.  Misc. Devices [facility]) [address] [facility] wheelchair with left fupper extremity support  Dx: stroke with left hemiparesis. 1 each 0           Allergies  Allergies   Allergen Reactions    Penicillins Shortness Of Breath and Rash    Amoxicillin         Social   Social History     Tobacco Use    Smoking status: Current Some Day Smoker     Packs/day: 1.00     Years: 30.00     Pack years: 30.00    Smokeless tobacco: Never Used   Substance Use Topics    Alcohol use: Not Currently     Alcohol/week: 16.8 oz     Types: 28 Glasses of wine per week                History reviewed. No pertinent family history. and/or weight based adjustment of the mA/kV was utilized to reduce the radiation dose to as low as reasonably achievable. COMPARISON: 04/11/2019 HISTORY: ORDERING SYSTEM PROVIDED HISTORY: ABDOMINAL PAIN TECHNOLOGIST PROVIDED HISTORY: Water soluble contrast only please Ordering Physician Provided Reason for Exam: Abdominal pain - Vented patient. Contrast given via nurse through NG tube. Acuity: Unknown Type of Exam: Unknown Relevant Medical/Surgical History: Hx - Sepsis due to urinary tract infection. FINDINGS: Lower Chest: New moderate layering bilateral pleural effusions with bilateral lower lobe atelectasis. Organs: Limited evaluation due lack of intravenous contrast.  Cholelithiasis redemonstrated. No gallbladder wall thickening or biliary ductal dilatation. Scattered tiny hypodense lesions in the liver are too small to characterize but statistically represent benign cysts or hemangiomas and appear unchanged. The pancreas, spleen, adrenal glands, and kidneys are unremarkable. There is no hydronephrosis or urinary tract calculus. GI/Bowel: The stomach is distended. Enteric tube is in place. No contrast is seen distal to the pylorus and there is contrast reflux into the distal esophagus. There is no evidence of bowel obstruction. The appendix is not definitely visualized. No focal pericecal inflammatory changes are evident. Pelvis: The urinary bladder is decompressed by Tran catheter. No pelvic mass is seen. Peritoneum/Retroperitoneum: Small amount of free fluid in the pelvic cavity. No free air or focal fluid collection. No abnormal lymph node. Normal abdominal aortic caliber. Moderate calcific atherosclerosis. Bones/Soft Tissues: No acute osseous abnormality. Diffuse anasarca. Moderate degenerative changes in the lumbar spine. 1. Distended, contrast filled stomach with reflux of contrast into the esophagus.   No enteric contrast is seen distal to the pylorus suggesting delayed gastric emptying in the setting of ileus. 2. New moderate layering bilateral pleural effusions with bilateral lower lobe atelectasis. 3. Small amount of nonspecific free fluid in the pelvic cavity. 4. Anasarca. 5. Cholelithiasis. Xr Chest (single View Frontal)    Result Date: 4/13/2019  EXAMINATION: SINGLE XRAY VIEW OF THE CHEST 4/13/2019 7:18 am COMPARISON: April 12, 2019 HISTORY: ORDERING SYSTEM PROVIDED HISTORY: dyspnea TECHNOLOGIST PROVIDED HISTORY: dyspnea Ordering Physician Provided Reason for Exam: dyspnea Acuity: Acute Type of Exam: Subsequent/Follow-up Additional signs and symptoms: dyspnea FINDINGS: A right IJ catheter is seen with its tip terminating at the superior cavoatrial junction. The left chest wall pacemaker and leads are stable. The cardiomediastinal silhouette is stable. There is interval increased opacity at the right lung base, may be related to atelectasis versus pneumonia. There is small atelectasis and mild pleural effusion at the left lung base. There is no pneumothorax. There is no acute osseous abnormality. Interval increased opacity at the right lung base, may be related to atelectasis versus pneumonia. Small atelectasis and mild pleural effusion at the left lung, new since the prior study. Xr Femur Left (min 2 Views)    Result Date: 3/27/2019  EXAMINATION: 4 XRAY VIEWS OF THE LEFT FEMUR; 2 XRAY VIEWS OF THE LEFT KNEE; 3 XRAY VIEWS OF THE LEFT TIBIA AND FIBULA 3/27/2019 7:00 pm COMPARISON: Left hip 11/14/2015. HISTORY: ORDERING SYSTEM PROVIDED HISTORY: pain TECHNOLOGIST PROVIDED HISTORY: pain Ordering Physician Provided Reason for Exam: pt twisted wrong, lt knee pain, unable to straighten knee. Acuity: Acute Type of Exam: Initial FINDINGS: Left femur, four views: The bones are diffusely osteopenic. No acute fracture deformity. The hip joint is maintained. The femoral head projects within the acetabulum. No focal soft tissue abnormality is seen. Left knee, two views: The knee is flexed. No acute osseous abnormality. No joint effusion. Joint spaces are not optimally profiled but no significant arthritic changes are apparent. Left tib-fib, three views: There is evidence of a remote healed distal tibia fracture. No acute fracture or dislocation is seen. No focal soft tissue abnormality. No acute osseous abnormality of the left femur. No acute osseous abnormality of the left knee. No acute osseous abnormality of the left tib-fib. Healed remote distal tibia fracture. Marked osteopenia, likely due to disuse. Xr Knee Left (1-2 Views)    Result Date: 3/27/2019  EXAMINATION: 4 XRAY VIEWS OF THE LEFT FEMUR; 2 XRAY VIEWS OF THE LEFT KNEE; 3 XRAY VIEWS OF THE LEFT TIBIA AND FIBULA 3/27/2019 7:00 pm COMPARISON: Left hip 11/14/2015. HISTORY: ORDERING SYSTEM PROVIDED HISTORY: pain TECHNOLOGIST PROVIDED HISTORY: pain Ordering Physician Provided Reason for Exam: pt twisted wrong, lt knee pain, unable to straighten knee. Acuity: Acute Type of Exam: Initial FINDINGS: Left femur, four views: The bones are diffusely osteopenic. No acute fracture deformity. The hip joint is maintained. The femoral head projects within the acetabulum. No focal soft tissue abnormality is seen. Left knee, two views: The knee is flexed. No acute osseous abnormality. No joint effusion. Joint spaces are not optimally profiled but no significant arthritic changes are apparent. Left tib-fib, three views: There is evidence of a remote healed distal tibia fracture. No acute fracture or dislocation is seen. No focal soft tissue abnormality. No acute osseous abnormality of the left femur. No acute osseous abnormality of the left knee. No acute osseous abnormality of the left tib-fib. Healed remote distal tibia fracture. Marked osteopenia, likely due to disuse.      Xr Knee Left (3 Views)    Result Date: 3/30/2019  EXAMINATION: 3 XRAY VIEWS OF THE LEFT KNEE 3/30/2019 10:58 am COMPARISON: March 27, 2018 HISTORY: ORDERING SYSTEM PROVIDED HISTORY: F/u L knee pain after cast TECHNOLOGIST PROVIDED HISTORY: AP, oblique, lateral F/u L knee pain after cast FINDINGS: Marked osteopenia. Interval casting, which degrades fine osseous detail question cortical offset in the lateral femoral metaphysis. No malalignment identified. No significant joint effusion. Interval casting. Question nondisplaced fracture in the lateral femoral metaphysis. Osteopenia. Xr Tibia Fibula Left (2 Views)    Result Date: 3/27/2019  EXAMINATION: 4 XRAY VIEWS OF THE LEFT FEMUR; 2 XRAY VIEWS OF THE LEFT KNEE; 3 XRAY VIEWS OF THE LEFT TIBIA AND FIBULA 3/27/2019 7:00 pm COMPARISON: Left hip 11/14/2015. HISTORY: ORDERING SYSTEM PROVIDED HISTORY: pain TECHNOLOGIST PROVIDED HISTORY: pain Ordering Physician Provided Reason for Exam: pt twisted wrong, lt knee pain, unable to straighten knee. Acuity: Acute Type of Exam: Initial FINDINGS: Left femur, four views: The bones are diffusely osteopenic. No acute fracture deformity. The hip joint is maintained. The femoral head projects within the acetabulum. No focal soft tissue abnormality is seen. Left knee, two views: The knee is flexed. No acute osseous abnormality. No joint effusion. Joint spaces are not optimally profiled but no significant arthritic changes are apparent. Left tib-fib, three views: There is evidence of a remote healed distal tibia fracture. No acute fracture or dislocation is seen. No focal soft tissue abnormality. No acute osseous abnormality of the left femur. No acute osseous abnormality of the left knee. No acute osseous abnormality of the left tib-fib. Healed remote distal tibia fracture. Marked osteopenia, likely due to disuse.      Xr Abdomen (kub) (single Ap View)    Result Date: 4/14/2019  EXAMINATION: SINGLE SUPINE XRAY VIEW(S) OF THE ABDOMEN 4/14/2019 7:39 am COMPARISON: CT abdomen and pelvis  film from 11 April 2019 HISTORY: [address] [place] [address] Avenue: Abd Distention TECHNOLOGIST PROVIDED HISTORY: Abd Distention Ordering Physician Provided Reason for Exam: Abdominal distention. Pt was moving around in the bed. Best films at present time. Acuity: Acute Type of Exam: Initial Additional signs and symptoms: Abdominal distention. Pt was moving around in the bed. Best films at present time. FINDINGS: Portable view time stamped at 748 hours demonstrates an intestinal tube terminating in the midportion of a gaseous Spike dilated stomach. Densities are present over the stomach likely medication. Bipolar pacemaker is in situ with intact leads. Heart size is top-normal, stable. Gaseous distension of the stomach and loop of bowel in the upper mid abdomen is noted but there is gas and fecal material in the rectum. Gastric outlet obstruction or proximal small bowel partial obstruction is suspected. No free air is noted. Midline city is present over the pelvis likely a monitor or CT small bore catheter. Vascular calcification is present in the pelvis. Persistent preferential gaseous distension of the stomach although there is some gas in the upper abdomen and gas and fecal material present in the rectosigmoid. Findings suggest gastric outlet obstruction. Ct Abdomen Pelvis W Iv Contrast    Result Date: 4/11/2019  EXAMINATION: CT OF THE ABDOMEN AND PELVIS WITH CONTRAST 4/11/2019 5:14 pm TECHNIQUE: CT of the abdomen and pelvis was performed with the administration of intravenous contrast. Multiplanar reformatted images are provided for review. Dose modulation, iterative reconstruction, and/or weight based adjustment of the mA/kV was utilized to reduce the radiation dose to as low as reasonably achievable. COMPARISON: None. HISTORY: ORDERING SYSTEM PROVIDED HISTORY: Abdominal pain TECHNOLOGIST PROVIDED HISTORY: IV Only Contrast Ordering Physician Provided Reason for Exam: patient c/o abd pain for an hour FINDINGS: Lower Chest: Trace pleural fluid bilaterally is noted. emphysematous cystitis. Dilatation of the ureters may be related to reflux or infection. 5. Atherosclerotic disease. 6. Other findings as above. Critical results were called by Dr. Abigail Sanchez MD to [address] on 4/11/2019 at 17:37. Xr Chest Portable    Result Date: 4/16/2019  EXAMINATION: SINGLE XRAY VIEW OF THE CHEST 4/16/2019 6:54 am COMPARISON: 04/15/2019, 610 hours HISTORY: ORDERING SYSTEM PROVIDED HISTORY: ETT placement TECHNOLOGIST PROVIDED HISTORY: ETT placement Ordering Physician Provided Reason for Exam: on vent Acuity: Acute Type of Exam: Initial 38-year-old female on ventilator; check endotracheal tube placement FINDINGS: Portable AP upright view of the chest. Endotracheal tube distal tip overlying the mid trachea approximately 4.1 cm above the level of the ron. Enteric tube traverses the GE junction with distal tip excluded from the field of view. Left subclavian approach cardiac pacemaker device distal lead tips relatively stable in position. Right internal jugular approach central venous catheter distal tip overlying the high right atrium, stable. Cardiac monitor leads overlie the chest. Atherosclerotic calcification of the thoracic aorta. Slight stable volume loss of the left hemithorax. No pneumothorax. No free air. Dense retrocardiac/left basilar airspace consolidation and small left-sided pleural effusion. Stable mild focal opacity at the right mid lung zone. Underlying COPD. Stable mild pulmonary vascular congestion and left-sided predominant parahilar opacity. Visualized osseous structures remain unchanged. 1. Stable multifocal airspace disease as detailed above with dense retrocardiac/left basilar airspace consolidation and small left-sided pleural effusion. Mild pulmonary vascular congestion. Findings may represent edema or multifocal pneumonia. 2. Underlying COPD. 3. Tubes and line as detailed above.      Xr Chest Portable    Result Date: 4/15/2019  EXAMINATION: SINGLE

## 2019-05-18 NOTE — FLOWSHEET NOTE
05/18/19 1500   Encounter Summary   Services provided to: Patient   Referral/Consult From: Palliative Care   Complexity of Encounter Low   Length of Encounter 15 minutes   Spiritual/Hinduism   Type Spiritual support   Assessment Unable to respond   Intervention Prayer   Outcome Did not respond

## 2019-05-18 NOTE — PLAN OF CARE
Problem: Risk for Impaired Skin Integrity  Goal: Tissue integrity - skin and mucous membranes  Description  Structural intactness and normal physiological function of skin and  mucous membranes. 5/18/2019 1626 by Vikas Potter RN  Outcome: Ongoing  Note:   No new skin breakdown noted this shift, patient turned every 2 hours   5/18/2019 0657 by Shayy Martines RN  Outcome: Ongoing  Note:   Patient turned and repositioned every 2 hours and as needed for comfort. Skin assessment as charted. No new skin breakdown noted. Problem: Falls - Risk of:  Goal: Will remain free from falls  Description  Will remain free from falls  5/18/2019 1626 by Vikas Potter RN  Outcome: Ongoing  Note:   Patient remains free from falls this shift, appropriate safety measures in place   5/18/2019 0657 by Shayy Martines RN  Outcome: Ongoing  Goal: Absence of physical injury  Description  Absence of physical injury  Outcome: Ongoing     Problem: Infection, Septic Shock:  Goal: Will show no infection signs and symptoms  Description  Will show no infection signs and symptoms  5/18/2019 1626 by Vikas Potter RN  Outcome: Ongoing  Note:   Levo restarted then stopped again this shift   5/18/2019 0657 by Shayy Martines RN  Outcome: Ongoing  Note:   No temps noted throughout shift     Problem: Tissue Perfusion, Altered:  Goal: Circulatory function within specified parameters  Description  Circulatory function within specified parameters  Outcome: Ongoing     Problem: Pain:  Goal: Pain level will decrease  Description  Pain level will decrease  5/18/2019 1626 by Vikas Potter RN  Outcome: Ongoing  Note:   Patient medicated for pain   5/18/2019 0657 by Shayy Martines RN  Outcome: Ongoing  Note:   Pain assessed at regular intervals. Medications administered as requested for comfort. Pain remains at a tolerable level.    Goal: Control of acute pain  Description  Control of acute pain  Outcome: Ongoing  Goal: Control of chronic pain  Description  Control of chronic pain  Outcome: Ongoing     Problem: Nutrition  Goal: Optimal nutrition therapy  Description       5/18/2019 1626 by Brigette Alfonso RN  Outcome: Ongoing  5/18/2019 1554 by Cherry Hoover RD, LD  Outcome: Ongoing     Problem: Restraint Use - Nonviolent/Non-Self-Destructive Behavior:  Goal: Absence of restraint indications  Description  Absence of restraint indications    5/18/2019 1626 by Brigette Alfonso RN  Outcome: Ongoing  Note:   Patient attempts to pull at lines and tubes when unrestrained. Restraints continue   5/18/2019 0657 by Natasha Soliz RN  Outcome: Not Met This Shift  Note:   Patient attempts to pull at lines/tubes when restraints are released. Restraints remain in place for patient safety. Goal: Absence of restraint-related injury  Description  Absence of restraint-related injury    5/18/2019 1626 by Brigette Alfonso RN  Outcome: Ongoing  5/18/2019 0657 by Natasha Soliz RN  Outcome: Ongoing  Note:   Restraints released and ROM performed every 2 hours and PRN. No injury noted.

## 2019-05-18 NOTE — PLAN OF CARE
Nutrition Problem: Inadequate oral intake  Intervention: Food and/or Nutrient Delivery: Continue NPO, Modify current Parenteral Nutrition  Nutritional Goals: Adequate nutritional intake or provision

## 2019-05-18 NOTE — PROGRESS NOTES
General Surgery Progress Note            PATIENT NAME: Ayse Slaughter     TODAY'S DATE: 5/18/2019, 1:32 PM    SUBJECTIVE:    Pt  Seen and examined. Off pressors. Remains intubated. OBJECTIVE:   VITALS:  BP (!) 134/49   Pulse 84   Temp 98 °F (36.7 °C) (Oral)   Resp 16   Ht 5' (1.524 m)   Wt 105 lb 2.6 oz (47.7 kg)   SpO2 99%   BMI 20.54 kg/m²      INTAKE/OUTPUT:      Intake/Output Summary (Last 24 hours) at 5/18/2019 1332  Last data filed at 5/18/2019 1331  Gross per 24 hour   Intake 4334.37 ml   Output 4100 ml   Net 234.37 ml                 CONSTITUTIONAL:  awake and alert. no apparent distress  HEART:   Regular   LUNGS:   Diminished   ABDOMEN:   Abdomen soft, minimally tender, non-distended. Incision intact.  Drain SS  EXTREMITIES:   1+ edema    Data:  CBC with Differential:    Lab Results   Component Value Date    WBC 9.9 05/18/2019    RBC 3.13 05/18/2019    RBC 3.66 05/23/2012    HGB 9.0 05/18/2019    HCT 26.5 05/18/2019     05/18/2019     05/23/2012    MCV 84.8 05/18/2019    MCH 28.8 05/18/2019    MCHC 34.0 05/18/2019    RDW 15.5 05/18/2019    METASPCT 2 05/15/2019    LYMPHOPCT 3 05/18/2019    MONOPCT 3 05/18/2019    MYELOPCT 1 05/07/2019    BASOPCT 0 05/18/2019    MONOSABS 0.30 05/18/2019    LYMPHSABS 0.30 05/18/2019    EOSABS 0.00 05/18/2019    BASOSABS 0.00 05/18/2019    DIFFTYPE NOT REPORTED 05/18/2019     CMP:    Lab Results   Component Value Date     05/18/2019    K 3.1 05/18/2019     05/18/2019    CO2 24 05/18/2019    BUN 8 05/18/2019    CREATININE <0.40 05/18/2019    GFRAA CANNOT BE CALCULATED 05/18/2019    LABGLOM CANNOT BE CALCULATED 05/18/2019    GLUCOSE 154 05/18/2019    GLUCOSE 87 05/21/2012    PROT 5.2 05/18/2019    LABALBU 3.5 05/18/2019    LABALBU 4.6 05/17/2012    CALCIUM 7.8 05/18/2019    BILITOT 0.37 05/18/2019    ALKPHOS 62 05/18/2019    AST 13 05/18/2019    ALT 11 05/18/2019         ASSESSMENT     Principal Problem:    Acute respiratory failure [facility])  Active Problems:    Sepsis due to urinary tract infection (HCC)    Cystitis    Emphysematous cystitis    Septic shock (HCC)    Leukemoid reaction    Aspiration pneumonia of right lower lobe due to vomit (HCC)    MRSA carrier    Urinary retention    Abdominal distention    Elevated CEA    Elevated CA 19-9 level    Cholecystitis    CRP elevated    Elevated procalcitonin    Bandemia    Centrilobular emphysema (HCC)    Hemorrhage of rectum and anus    GI bleed    Anemia    Small bowel obstruction (HCC)    Anxiety disorder    Noncompliance  Resolved Problems:    * No resolved hospital problems. *    S/P right colectomy, cholecystectomy    Plan  1. Wean off vent as tolerated  2. Continue antibiotics  3. Continue TPN  4.  Up as tolerated        Reida Dire, DO [address]

## 2019-05-18 NOTE — PROGRESS NOTES
ICU Progress Note (Vent)   [facility] Specialists    Patient - Juliet Avalos,  Age - 77 y.o.    - 1950      Room Number -    N -  [id number]   [facility]  Date of Admission -  2019  2:48 PM     Follow-up: Acute respiratory failure    Events of Past 24 Hours   On AC 16, 500, + 5 & 28 % FiO2   on Versed drip at 3 mg/h and back on Levophed drips, total intake around 60 m/h including TPN  No Fever or chills , not much tracheal secretions  Not Much NG drainage , started TPN  Good urine output    Vitals    height is 5' (1.524 m) and weight is 105 lb 2.6 oz (47.7 kg). Her oral temperature is 98.2 °F (36.8 °C). Her blood pressure is 99/33 (abnormal) and her pulse is 80. Her respiration is 16 and oxygen saturation is 100%. Temperature Range: Temp: 98.2 °F (36.8 °C) Temp  Av.4 °F (36.9 °C)  Min: 98.1 °F (36.7 °C)  Max: 98.7 °F (37.1 °C)  BP Range:  Systolic (51RZG), REBECCA:584 , Min:59 , UIK:379     Diastolic (06TMA), JWQ:06, Min:21, Max:97    Pulse Range: Pulse  Av.3  Min: 80  Max: 122  Respiration Range: Resp  Av.9  Min: 15  Max: 27  Current Pulse Ox[de-identified]  SpO2: 100 %  24HR Pulse Ox Range:  SpO2  Av %  Min: 73 %  Max: 100 %  Oxygen Amount and Delivery: O2 Flow Rate (L/min): 2 L/min      Wt Readings from Last 3 Encounters:   19 105 lb 2.6 oz (47.7 kg)   19 89 lb (40.4 kg)   19 94 lb 1.6 oz (42.7 kg)     I/O       Intake/Output Summary (Last 24 hours) at 2019 1137  Last data filed at 2019 0515  Gross per 24 hour   Intake 4334.37 ml   Output 3990 ml   Net 344.37 ml     I/O last 3 completed shifts:   In: 4334.4 [I.V.:1195.3; Blood:477.5; IV Piggyback:]  Out: 0473 [Urine:3975; Emesis/NG output:10; Drains:330]     DRAIN/TUBE OUTPUT:     Invasive Lines   ETT Day -     Lines -      ICP PRESSURE RANGE:  No data recorded  CVP PRESSURE RANGE:  No data recorded  Mechanical Ventilation Data   SETTINGS (Comprehensive)  Vent Information  $Ventilation: $Subsequent Day  Ventilator Started: Yes  Ventilation Day(s): 4  Skin Assessment: Clean, dry, & intact  Equipment ID: [id number]  Equipment Changed: HME  Vent Type: Servo i  Vent Mode: PRVC  Vt Ordered: 500 mL  Rate Set: 16 bmp  Pressure Support: 7 cmH20  FiO2 : 28 %  Sensitivity: 5  PEEP/CPAP: 5  I Time/ I Time %: 0.9 s  Cuff Pressure (cm H2O): 20 cm H2O  Humidification Source: HME  Additional Respiratory  Assessments  Pulse: 80  Resp: 16  SpO2: 100 %  End Tidal CO2: 32 (%)  Position: Semi-Diaz's  Humidification Source: E  Oral Care Completed?: Yes  Oral Care: Mouth swabbed, Mouth moisturizer, Mouth suctioned, Lip moisturizer applied  Subglottic Suction Done?: Yes  Cuff Pressure (cm H2O): 20 cm H2O       ABGs:   Lab Results   Component Value Date    PHART 7.459 05/18/2019    PO2ART 85.1 05/18/2019    NBE9ZHY 36.1 05/18/2019       Lab Results   Component Value Date    MODE PRVC 05/18/2019         Medications   IV   PN-Adult 2-in-1 Central Line (Standard) 41.7 mL/hr at 05/17/19 1904    dextrose      midazolam 3 mg/hr (05/18/19 0857)    lactated ringers 10 mL/hr at 05/17/19 1917    norepinephrine Stopped (05/18/19 1117)      furosemide  20 mg Intravenous BID    albumin human  50 g Intravenous Q8H    fat emulsion  100 mL Intravenous Daily    insulin lispro  0-6 Units Subcutaneous Q6H    vancomycin (VANCOCIN) intermittent dosing (placeholder)   Other RX Placeholder    vancomycin  750 mg Intravenous Q12H    hydrocortisone sodium succinate PF  100 mg Intravenous Q6H    sodium chloride flush  10 mL Intravenous 2 times per day    enoxaparin  30 mg Subcutaneous Daily    pantoprazole  40 mg Intravenous Daily    And    sodium chloride (PF)  10 mL Intravenous Daily    ipratropium-albuterol  1 ampule Inhalation Q4H WA    meperidine  25 mg Intravenous Once    alteplase  1 mg Intracatheter Once    meropenem  1 g Intravenous Q8H    mometasone-formoterol  2 puff Inhalation BID       Diet/Nutrition   Diet NPO Effective Now Exceptions are: Sips with Meds  PN-Adult 2-in-1 Central Line (Standard)    Exam   VITALS    height is 5' (1.524 m) and weight is 105 lb 2.6 oz (47.7 kg). Her oral temperature is 98.2 °F (36.8 °C). Her blood pressure is 99/33 (abnormal) and her pulse is 80. Her respiration is 16 and oxygen saturation is 100%. Ventilator Settings (Basic)  Vent Mode: PRVC Rate Set: 16 bmp/Vt Ordered: 500 mL/ /FiO2 : 28 %    Constitutional - sedated, on vent  General Appearance  well developed, no acute distress  HEENT - Life support devices in place (ET, NG),normocephalic, atraumatic. PERRLA  Lungs - Chest expands equally, no wheezes, rales , coarse rhonchi. Cardiovascular - Heart sounds are normal.  normal rate and rhythm regular, no murmur, gallop or rub.   Abdomen - soft, little tender, nondistended,   Neurologic - sedated on Versed drip , no restlessness or agitation noted  Skin - no bruising or bleeding  Extremities - no cyanosis, clubbing or edema     Lab Results   CBC     Lab Results   Component Value Date    WBC 9.9 05/18/2019    RBC 3.13 05/18/2019    RBC 3.66 05/23/2012    HGB 9.0 05/18/2019    HCT 26.5 05/18/2019     05/18/2019     05/23/2012    MCV 84.8 05/18/2019    MCH 28.8 05/18/2019    MCHC 34.0 05/18/2019    RDW 15.5 05/18/2019    METASPCT 2 05/15/2019    LYMPHOPCT 3 05/18/2019    MONOPCT 3 05/18/2019    MYELOPCT 1 05/07/2019    BASOPCT 0 05/18/2019    MONOSABS 0.30 05/18/2019    LYMPHSABS 0.30 05/18/2019    EOSABS 0.00 05/18/2019    BASOSABS 0.00 05/18/2019    DIFFTYPE NOT REPORTED 05/18/2019       BMP   Lab Results   Component Value Date     05/18/2019    K 3.1 05/18/2019     05/18/2019    CO2 24 05/18/2019    BUN 8 05/18/2019    CREATININE <0.40 05/18/2019    GLUCOSE 154 05/18/2019    GLUCOSE 87 05/21/2012    CALCIUM 7.8 05/18/2019       LFTS  Lab Results   Component Value Date    ALKPHOS 62 05/18/2019    ALT 11 05/18/2019 AST 13 05/18/2019    PROT 5.2 05/18/2019    BILITOT 0.37 05/18/2019    BILIDIR 0.21 05/11/2019    IBILI 0.17 05/11/2019    LABALBU 3.5 05/18/2019    LABALBU 4.6 05/17/2012       INR  Recent Labs     05/16/19  0412 05/17/19  0417 05/18/19  0430   PROTIME 16.3* 16.1* 16.2*   INR 1.3 1.3 1.3       APTT  Recent Labs     05/16/19  0412 05/17/19  0417 05/18/19  0430   APTT 34.7 36.5* 31.3       Lactic Acid  Lab Results   Component Value Date    LACTA 1.9 05/16/2019    LACTA 1.8 05/12/2019    LACTA 0.9 05/11/2019        BNP   No results for input(s): BNP in the last 72 hours. Cultures       Radiology     Plain Films: Increased left pleural effusion         CT Scans    See actual reports for details    SYSTEM ASSESSMENT      Neuro       Respiratory   Wean oxygen as tolerated. Keep O2 sat > 88%       Hemodynamics       Gastrointestinal/Nutrition       Renal       Infectious Disease       Hematology/Oncology       Endocrine       Social/Spiritual/DNR/Disposition/Other   Acute respiratory failure with hypoxia day # 4 on vent, was on vent prior on this admission and was extubated 4/66  Metabolic acidosis/improved  Hypotension on Levophed drip ? ? Medication related  Leukocytosis? ?  If reactive or a new infection, back to normal  Severe COPD  Acute cholecystitis/cholecystostomy tube 4/22 then had ex lap 5/16 with  extensive lysis of adhesions, right colectomy with anastomosis and open cholecystectomy, found to have cholecystostomy tube traversing through the transverse colon  E. coli UTI/aspiration pneumonia   History of CVA with left hemiparesis  Anxiety/depression  Recent GI bleed  Hypervolemia fluid balance + 19 ML last 24-hour    Plan:    Continue vent support  Sedatives,  Protonix and Lovenox   pressors as needed,   Diuresis if systolic blood pressure >818  Antibiotic per ID, on vancomycin and meropenem, follow-up cultures:  So far blood cultures are negative ×2 days  Bronchodilators  Discussed with staff    Critical Care Time   > 30 min    Electronically signed by Judy Hudson MD on 5/18/2019 at 11:37 AM

## 2019-05-18 NOTE — CARE COORDINATION
ONGOING DISCHARGE PLAN:    Unable to speak to Pt. Remains on Vent, no family at the bedside. Plan appears for LSW to follow for SNF, [facility], for per notes, Pt. Was denied, [facility], but may be able to re - submit. Pt. Remains On Iv merrem, Lasix & Steroids. Pt is S/P Colon Resection w/ open ashley. Will continue to follow for additional discharge needs.     Electronically signed by Sonia Escamilla RN on 5/18/2019 at 4:51 PM

## 2019-05-18 NOTE — PROGRESS NOTES
Nutrition Assessment (Parenteral Nutrition)    Type and Reason for Visit: Reassess    Nutrition Recommendations: TPN rate increased to 65 ml/hr with following changes made in additives: Na acetate- 20 to 35 mEq, NaCl- 20 to 35 mEq, K acetate- 20 to 30 mEq, K Phos- 30 to 40 mmol, KCl- 30 mEq, Ca+- 4 to 6 mEq, Mg- 8 to 11 mEq, insulin- 8 units, no MVI or trace elements. Nutrition Assessment: Pt stable from a nutritional standpoint as TPN increased to 65 ml/hr, monitor nutrition progression. Malnutrition Assessment:  · Malnutrition Status: At risk for malnutrition  · Context: Acute illness or injury  · Findings of the 6 clinical characteristics of malnutrition (Minimum of 2 out of 6 clinical characteristics is required to make the diagnosis of moderate or severe Protein Calorie Malnutrition based on AND/ASPEN Guidelines):  1. Energy Intake-Less than or equal to 50% of estimated energy requirement, (since 5-13)    2. Weight Loss-Unable to assess(edema present),    3. Fat Loss-Unable to assess(Pt is thin; edema present; no known recent loss of muscle or fat),    4. Muscle Loss-Unable to assess,    5. Fluid Accumulation-Moderate to severe fluid accumulation, Extremities  6.  Strength-Not measured    Nutrition Risk Level: High    Nutrient Needs:  · Estimated Daily Total Kcal: 9836-8932 based on 35-39 kcal per kg of usual body wt  · Estimated Daily Protein (g): 63-68 based on 1.4-1.5 gm/kg IBW(revised)    Nutrition Diagnosis:   · Problem: Inadequate oral intake  · Etiology: related to Alteration in GI function, Insufficient energy/nutrient consumption     Signs and symptoms:  as evidenced by NPO status due to medical condition, GI abnormality    Objective Information:  · Nutrition-Focused Physical Findings: Hypoactive bowel sounds. Edema: +2 pitting RUE, RLE; +4 pitting LUE; +1 pitting LLE.    · Wound Type: Deep Tissue Injury, Multiple, Stage II, Pressure Ulcer(Wound under cast)  · Current Nutrition

## 2019-05-19 ENCOUNTER — APPOINTMENT (OUTPATIENT)
Dept: GENERAL RADIOLOGY | Age: 69
DRG: 853 | End: 2019-05-19
Payer: COMMERCIAL

## 2019-05-19 LAB
ABSOLUTE EOS #: 0 K/UL (ref 0–0.4)
ABSOLUTE IMMATURE GRANULOCYTE: ABNORMAL K/UL (ref 0–0.3)
ABSOLUTE LYMPH #: 0.6 K/UL (ref 1–4.8)
ABSOLUTE MONO #: 0.4 K/UL (ref 0.1–1.3)
ALLEN TEST: ABNORMAL
ANION GAP SERPL CALCULATED.3IONS-SCNC: 14 MMOL/L (ref 9–17)
ANTI-XA UNFRAC HEPARIN: 0.15 IU/L (ref 0.3–0.7)
ANTI-XA UNFRAC HEPARIN: 0.28 IU/L (ref 0.3–0.7)
BASOPHILS # BLD: 0 % (ref 0–2)
BASOPHILS ABSOLUTE: 0 K/UL (ref 0–0.2)
BUN BLDV-MCNC: 14 MG/DL (ref 8–23)
BUN/CREAT BLD: ABNORMAL (ref 9–20)
CALCIUM SERPL-MCNC: 8.8 MG/DL (ref 8.6–10.4)
CARBOXYHEMOGLOBIN: 0.7 % (ref 0–5)
CHLORIDE BLD-SCNC: 100 MMOL/L (ref 98–107)
CO2: 31 MMOL/L (ref 20–31)
CREAT SERPL-MCNC: <0.4 MG/DL (ref 0.5–0.9)
DATE, STOOL #1: ABNORMAL
DATE, STOOL #2: ABNORMAL
DATE, STOOL #3: ABNORMAL
DIFFERENTIAL TYPE: ABNORMAL
EOSINOPHILS RELATIVE PERCENT: 0 % (ref 0–4)
FIO2: ABNORMAL
GFR AFRICAN AMERICAN: ABNORMAL ML/MIN
GFR NON-AFRICAN AMERICAN: ABNORMAL ML/MIN
GFR SERPL CREATININE-BSD FRML MDRD: ABNORMAL ML/MIN/{1.73_M2}
GFR SERPL CREATININE-BSD FRML MDRD: ABNORMAL ML/MIN/{1.73_M2}
GLUCOSE BLD-MCNC: 129 MG/DL (ref 65–105)
GLUCOSE BLD-MCNC: 145 MG/DL (ref 70–99)
GLUCOSE BLD-MCNC: 148 MG/DL (ref 65–105)
GLUCOSE BLD-MCNC: 155 MG/DL (ref 65–105)
HCO3 ARTERIAL: 35.3 MMOL/L (ref 22–26)
HCT VFR BLD CALC: 24.9 % (ref 36–46)
HEMOCCULT SP1 STL QL: POSITIVE
HEMOCCULT SP2 STL QL: ABNORMAL
HEMOCCULT SP3 STL QL: ABNORMAL
HEMOGLOBIN: 8.5 G/DL (ref 12–16)
IMMATURE GRANULOCYTES: ABNORMAL %
INR BLD: 1.5
LYMPHOCYTES # BLD: 7 % (ref 24–44)
MAGNESIUM: 1.5 MG/DL (ref 1.6–2.6)
MAGNESIUM: 2.1 MG/DL (ref 1.6–2.6)
MCH RBC QN AUTO: 28.7 PG (ref 26–34)
MCHC RBC AUTO-ENTMCNC: 34.1 G/DL (ref 31–37)
MCV RBC AUTO: 84.2 FL (ref 80–100)
METHEMOGLOBIN: 0.1 % (ref 0–1.9)
MODE: ABNORMAL
MONOCYTES # BLD: 4 % (ref 1–7)
NEGATIVE BASE EXCESS, ART: ABNORMAL MMOL/L (ref 0–2)
NOTIFICATION TIME: ABNORMAL
NOTIFICATION: ABNORMAL
NRBC AUTOMATED: ABNORMAL PER 100 WBC
O2 DEVICE/FLOW/%: ABNORMAL
O2 SAT, ARTERIAL: 97.3 % (ref 95–98)
OXYHEMOGLOBIN: ABNORMAL % (ref 95–98)
PARTIAL THROMBOPLASTIN TIME: 31.1 SEC (ref 24–36)
PATIENT TEMP: 37
PCO2 ARTERIAL: 42.6 MMHG (ref 35–45)
PCO2, ART, TEMP ADJ: ABNORMAL (ref 35–45)
PDW BLD-RTO: 15.7 % (ref 11.5–14.9)
PEEP/CPAP: 5
PH ARTERIAL: 7.53 (ref 7.35–7.45)
PH, ART, TEMP ADJ: ABNORMAL (ref 7.35–7.45)
PHOSPHORUS: 1.9 MG/DL (ref 2.6–4.5)
PHOSPHORUS: 2.4 MG/DL (ref 2.6–4.5)
PLATELET # BLD: 214 K/UL (ref 150–450)
PLATELET ESTIMATE: ABNORMAL
PMV BLD AUTO: 8.4 FL (ref 6–12)
PO2 ARTERIAL: 82.7 MMHG (ref 80–100)
PO2, ART, TEMP ADJ: ABNORMAL MMHG (ref 80–100)
POSITIVE BASE EXCESS, ART: 12.6 MMOL/L (ref 0–2)
POTASSIUM SERPL-SCNC: 3.1 MMOL/L (ref 3.7–5.3)
POTASSIUM SERPL-SCNC: 3.2 MMOL/L (ref 3.7–5.3)
POTASSIUM SERPL-SCNC: 4.7 MMOL/L (ref 3.7–5.3)
PROTHROMBIN TIME: 17.7 SEC (ref 11.8–14.6)
PSV: ABNORMAL
PT. POSITION: ABNORMAL
RBC # BLD: 2.95 M/UL (ref 4–5.2)
RBC # BLD: ABNORMAL 10*6/UL
RESPIRATORY RATE: ABNORMAL
SAMPLE SITE: ABNORMAL
SEG NEUTROPHILS: 89 % (ref 36–66)
SEGMENTED NEUTROPHILS ABSOLUTE COUNT: 8.1 K/UL (ref 1.3–9.1)
SET RATE: 16
SODIUM BLD-SCNC: 145 MMOL/L (ref 135–144)
TEXT FOR RESPIRATORY: ABNORMAL
THYROXINE, FREE: 0.98 NG/DL (ref 0.93–1.7)
TIME, STOOL #1: ABNORMAL
TIME, STOOL #2: ABNORMAL
TIME, STOOL #3: ABNORMAL
TOTAL HB: ABNORMAL G/DL (ref 12–16)
TOTAL RATE: 16
TSH SERPL DL<=0.05 MIU/L-ACNC: 18.35 MIU/L (ref 0.3–5)
VT: 500
WBC # BLD: 9.1 K/UL (ref 3.5–11)
WBC # BLD: ABNORMAL 10*3/UL

## 2019-05-19 PROCEDURE — 6360000002 HC RX W HCPCS: Performed by: STUDENT IN AN ORGANIZED HEALTH CARE EDUCATION/TRAINING PROGRAM

## 2019-05-19 PROCEDURE — 2500000003 HC RX 250 WO HCPCS: Performed by: STUDENT IN AN ORGANIZED HEALTH CARE EDUCATION/TRAINING PROGRAM

## 2019-05-19 PROCEDURE — 2000000000 HC ICU R&B

## 2019-05-19 PROCEDURE — 6370000000 HC RX 637 (ALT 250 FOR IP): Performed by: SURGERY

## 2019-05-19 PROCEDURE — 84132 ASSAY OF SERUM POTASSIUM: CPT

## 2019-05-19 PROCEDURE — 85730 THROMBOPLASTIN TIME PARTIAL: CPT

## 2019-05-19 PROCEDURE — 85520 HEPARIN ASSAY: CPT

## 2019-05-19 PROCEDURE — C9113 INJ PANTOPRAZOLE SODIUM, VIA: HCPCS | Performed by: SURGERY

## 2019-05-19 PROCEDURE — 2580000003 HC RX 258: Performed by: SURGERY

## 2019-05-19 PROCEDURE — 99233 SBSQ HOSP IP/OBS HIGH 50: CPT | Performed by: INTERNAL MEDICINE

## 2019-05-19 PROCEDURE — 84443 ASSAY THYROID STIM HORMONE: CPT

## 2019-05-19 PROCEDURE — 80048 BASIC METABOLIC PNL TOTAL CA: CPT

## 2019-05-19 PROCEDURE — 94003 VENT MGMT INPAT SUBQ DAY: CPT

## 2019-05-19 PROCEDURE — 83735 ASSAY OF MAGNESIUM: CPT

## 2019-05-19 PROCEDURE — 94761 N-INVAS EAR/PLS OXIMETRY MLT: CPT

## 2019-05-19 PROCEDURE — G0328 FECAL BLOOD SCRN IMMUNOASSAY: HCPCS

## 2019-05-19 PROCEDURE — 2700000000 HC OXYGEN THERAPY PER DAY

## 2019-05-19 PROCEDURE — 99999 PR OFFICE/OUTPT VISIT,PROCEDURE ONLY: CPT | Performed by: INTERNAL MEDICINE

## 2019-05-19 PROCEDURE — 2500000003 HC RX 250 WO HCPCS: Performed by: SURGERY

## 2019-05-19 PROCEDURE — 84439 ASSAY OF FREE THYROXINE: CPT

## 2019-05-19 PROCEDURE — 6360000002 HC RX W HCPCS: Performed by: SURGERY

## 2019-05-19 PROCEDURE — 93005 ELECTROCARDIOGRAM TRACING: CPT

## 2019-05-19 PROCEDURE — 85025 COMPLETE CBC W/AUTO DIFF WBC: CPT

## 2019-05-19 PROCEDURE — 84100 ASSAY OF PHOSPHORUS: CPT

## 2019-05-19 PROCEDURE — 2500000003 HC RX 250 WO HCPCS: Performed by: INTERNAL MEDICINE

## 2019-05-19 PROCEDURE — 2580000003 HC RX 258: Performed by: INTERNAL MEDICINE

## 2019-05-19 PROCEDURE — 36415 COLL VENOUS BLD VENIPUNCTURE: CPT

## 2019-05-19 PROCEDURE — 94640 AIRWAY INHALATION TREATMENT: CPT

## 2019-05-19 PROCEDURE — 85610 PROTHROMBIN TIME: CPT

## 2019-05-19 PROCEDURE — 82805 BLOOD GASES W/O2 SATURATION: CPT

## 2019-05-19 PROCEDURE — 36600 WITHDRAWAL OF ARTERIAL BLOOD: CPT

## 2019-05-19 PROCEDURE — 71045 X-RAY EXAM CHEST 1 VIEW: CPT

## 2019-05-19 PROCEDURE — P9047 ALBUMIN (HUMAN), 25%, 50ML: HCPCS | Performed by: STUDENT IN AN ORGANIZED HEALTH CARE EDUCATION/TRAINING PROGRAM

## 2019-05-19 PROCEDURE — 6370000000 HC RX 637 (ALT 250 FOR IP): Performed by: INTERNAL MEDICINE

## 2019-05-19 PROCEDURE — 6360000002 HC RX W HCPCS: Performed by: INTERNAL MEDICINE

## 2019-05-19 PROCEDURE — 82947 ASSAY GLUCOSE BLOOD QUANT: CPT

## 2019-05-19 PROCEDURE — 2580000003 HC RX 258: Performed by: STUDENT IN AN ORGANIZED HEALTH CARE EDUCATION/TRAINING PROGRAM

## 2019-05-19 RX ORDER — METHYLPREDNISOLONE SODIUM SUCCINATE 40 MG/ML
40 INJECTION, POWDER, LYOPHILIZED, FOR SOLUTION INTRAMUSCULAR; INTRAVENOUS DAILY
Status: DISCONTINUED | OUTPATIENT
Start: 2019-05-19 | End: 2019-05-22

## 2019-05-19 RX ORDER — METOPROLOL TARTRATE 5 MG/5ML
5 INJECTION INTRAVENOUS EVERY 4 HOURS PRN
Status: DISCONTINUED | OUTPATIENT
Start: 2019-05-19 | End: 2019-05-19

## 2019-05-19 RX ORDER — HEPARIN SODIUM 5000 [USP'U]/ML
30 INJECTION, SOLUTION INTRAVENOUS; SUBCUTANEOUS PRN
Status: DISCONTINUED | OUTPATIENT
Start: 2019-05-19 | End: 2019-05-20

## 2019-05-19 RX ORDER — HEPARIN SODIUM 10000 [USP'U]/100ML
12 INJECTION, SOLUTION INTRAVENOUS CONTINUOUS
Status: DISCONTINUED | OUTPATIENT
Start: 2019-05-19 | End: 2019-05-20

## 2019-05-19 RX ORDER — HEPARIN SODIUM 5000 [USP'U]/ML
60 INJECTION, SOLUTION INTRAVENOUS; SUBCUTANEOUS ONCE
Status: COMPLETED | OUTPATIENT
Start: 2019-05-19 | End: 2019-05-19

## 2019-05-19 RX ORDER — HEPARIN SODIUM 5000 [USP'U]/ML
60 INJECTION, SOLUTION INTRAVENOUS; SUBCUTANEOUS PRN
Status: DISCONTINUED | OUTPATIENT
Start: 2019-05-19 | End: 2019-05-20

## 2019-05-19 RX ORDER — METOPROLOL TARTRATE 5 MG/5ML
5 INJECTION INTRAVENOUS EVERY 4 HOURS
Status: DISCONTINUED | OUTPATIENT
Start: 2019-05-19 | End: 2019-05-20

## 2019-05-19 RX ADMIN — CALCIUM GLUCONATE: 94 INJECTION, SOLUTION INTRAVENOUS at 17:16

## 2019-05-19 RX ADMIN — AMIODARONE HYDROCHLORIDE 1 MG/MIN: 1.8 INJECTION, SOLUTION INTRAVENOUS at 12:35

## 2019-05-19 RX ADMIN — HYDROMORPHONE HYDROCHLORIDE 0.5 MG: 1 INJECTION, SOLUTION INTRAMUSCULAR; INTRAVENOUS; SUBCUTANEOUS at 13:36

## 2019-05-19 RX ADMIN — IPRATROPIUM BROMIDE AND ALBUTEROL SULFATE 1 AMPULE: .5; 3 SOLUTION RESPIRATORY (INHALATION) at 15:26

## 2019-05-19 RX ADMIN — AMIODARONE HYDROCHLORIDE 150 MG: 1.5 INJECTION, SOLUTION INTRAVENOUS at 12:18

## 2019-05-19 RX ADMIN — HEPARIN SODIUM AND DEXTROSE 12 UNITS/KG/HR: 10000; 5 INJECTION INTRAVENOUS at 09:20

## 2019-05-19 RX ADMIN — HYDROMORPHONE HYDROCHLORIDE 0.5 MG: 1 INJECTION, SOLUTION INTRAMUSCULAR; INTRAVENOUS; SUBCUTANEOUS at 09:56

## 2019-05-19 RX ADMIN — MAGNESIUM SULFATE HEPTAHYDRATE 1 G: 1 INJECTION, SOLUTION INTRAVENOUS at 08:21

## 2019-05-19 RX ADMIN — MIDAZOLAM 4 MG/HR: 5 INJECTION INTRAMUSCULAR; INTRAVENOUS at 12:19

## 2019-05-19 RX ADMIN — POTASSIUM CHLORIDE 10 MEQ: 7.46 INJECTION, SOLUTION INTRAVENOUS at 18:19

## 2019-05-19 RX ADMIN — POTASSIUM CHLORIDE 10 MEQ: 7.46 INJECTION, SOLUTION INTRAVENOUS at 01:56

## 2019-05-19 RX ADMIN — HYDROMORPHONE HYDROCHLORIDE 0.5 MG: 1 INJECTION, SOLUTION INTRAMUSCULAR; INTRAVENOUS; SUBCUTANEOUS at 03:31

## 2019-05-19 RX ADMIN — INSULIN LISPRO 1 UNITS: 100 INJECTION, SOLUTION INTRAVENOUS; SUBCUTANEOUS at 11:58

## 2019-05-19 RX ADMIN — HEPARIN SODIUM 1450 UNITS: 5000 INJECTION INTRAVENOUS; SUBCUTANEOUS at 18:25

## 2019-05-19 RX ADMIN — METHYLPREDNISOLONE SODIUM SUCCINATE 40 MG: 40 INJECTION, POWDER, FOR SOLUTION INTRAMUSCULAR; INTRAVENOUS at 09:19

## 2019-05-19 RX ADMIN — POTASSIUM CHLORIDE 10 MEQ: 7.46 INJECTION, SOLUTION INTRAVENOUS at 00:43

## 2019-05-19 RX ADMIN — IPRATROPIUM BROMIDE AND ALBUTEROL SULFATE 1 AMPULE: .5; 3 SOLUTION RESPIRATORY (INHALATION) at 06:48

## 2019-05-19 RX ADMIN — METOPROLOL TARTRATE 5 MG: 1 INJECTION, SOLUTION INTRAVENOUS at 22:34

## 2019-05-19 RX ADMIN — POTASSIUM CHLORIDE 10 MEQ: 7.46 INJECTION, SOLUTION INTRAVENOUS at 10:38

## 2019-05-19 RX ADMIN — POTASSIUM CHLORIDE 10 MEQ: 7.46 INJECTION, SOLUTION INTRAVENOUS at 07:12

## 2019-05-19 RX ADMIN — MEROPENEM 1 G: 1 INJECTION, POWDER, FOR SOLUTION INTRAVENOUS at 17:15

## 2019-05-19 RX ADMIN — I.V. FAT EMULSION 100 ML: 20 EMULSION INTRAVENOUS at 17:25

## 2019-05-19 RX ADMIN — ALBUMIN (HUMAN) 50 G: 0.25 INJECTION, SOLUTION INTRAVENOUS at 03:18

## 2019-05-19 RX ADMIN — SODIUM PHOSPHATE, MONOBASIC, MONOHYDRATE 7.62 MMOL: 276; 142 INJECTION, SOLUTION INTRAVENOUS at 09:33

## 2019-05-19 RX ADMIN — AMIODARONE HYDROCHLORIDE 0.5 MG/MIN: 1.8 INJECTION, SOLUTION INTRAVENOUS at 18:19

## 2019-05-19 RX ADMIN — HYDROMORPHONE HYDROCHLORIDE 0.5 MG: 1 INJECTION, SOLUTION INTRAMUSCULAR; INTRAVENOUS; SUBCUTANEOUS at 20:12

## 2019-05-19 RX ADMIN — HYDROCORTISONE SODIUM SUCCINATE 100 MG: 100 INJECTION, POWDER, FOR SOLUTION INTRAMUSCULAR; INTRAVENOUS at 03:32

## 2019-05-19 RX ADMIN — POTASSIUM CHLORIDE 10 MEQ: 7.46 INJECTION, SOLUTION INTRAVENOUS at 04:17

## 2019-05-19 RX ADMIN — POTASSIUM CHLORIDE 10 MEQ: 7.46 INJECTION, SOLUTION INTRAVENOUS at 08:21

## 2019-05-19 RX ADMIN — POTASSIUM CHLORIDE 10 MEQ: 7.46 INJECTION, SOLUTION INTRAVENOUS at 14:58

## 2019-05-19 RX ADMIN — IPRATROPIUM BROMIDE AND ALBUTEROL SULFATE 1 AMPULE: .5; 3 SOLUTION RESPIRATORY (INHALATION) at 19:42

## 2019-05-19 RX ADMIN — MAGNESIUM SULFATE HEPTAHYDRATE 1 G: 1 INJECTION, SOLUTION INTRAVENOUS at 07:19

## 2019-05-19 RX ADMIN — METOPROLOL TARTRATE 5 MG: 1 INJECTION, SOLUTION INTRAVENOUS at 14:56

## 2019-05-19 RX ADMIN — Medication 10 ML: at 08:00

## 2019-05-19 RX ADMIN — POTASSIUM CHLORIDE 10 MEQ: 7.46 INJECTION, SOLUTION INTRAVENOUS at 16:11

## 2019-05-19 RX ADMIN — METOPROLOL TARTRATE 5 MG: 5 INJECTION INTRAVENOUS at 07:17

## 2019-05-19 RX ADMIN — MEROPENEM 1 G: 1 INJECTION, POWDER, FOR SOLUTION INTRAVENOUS at 09:33

## 2019-05-19 RX ADMIN — MEROPENEM 1 G: 1 INJECTION, POWDER, FOR SOLUTION INTRAVENOUS at 01:56

## 2019-05-19 RX ADMIN — IPRATROPIUM BROMIDE AND ALBUTEROL SULFATE 1 AMPULE: .5; 3 SOLUTION RESPIRATORY (INHALATION) at 11:05

## 2019-05-19 RX ADMIN — POTASSIUM CHLORIDE 10 MEQ: 7.46 INJECTION, SOLUTION INTRAVENOUS at 17:15

## 2019-05-19 RX ADMIN — METOPROLOL TARTRATE 5 MG: 1 INJECTION, SOLUTION INTRAVENOUS at 09:52

## 2019-05-19 RX ADMIN — HEPARIN SODIUM 2850 UNITS: 5000 INJECTION INTRAVENOUS; SUBCUTANEOUS at 09:20

## 2019-05-19 RX ADMIN — INSULIN LISPRO 1 UNITS: 100 INJECTION, SOLUTION INTRAVENOUS; SUBCUTANEOUS at 17:16

## 2019-05-19 RX ADMIN — PANTOPRAZOLE SODIUM 40 MG: 40 INJECTION, POWDER, FOR SOLUTION INTRAVENOUS at 08:00

## 2019-05-19 RX ADMIN — ENOXAPARIN SODIUM 30 MG: 30 INJECTION SUBCUTANEOUS at 07:59

## 2019-05-19 RX ADMIN — POTASSIUM CHLORIDE 10 MEQ: 7.46 INJECTION, SOLUTION INTRAVENOUS at 02:57

## 2019-05-19 ASSESSMENT — PAIN SCALES - GENERAL
PAINLEVEL_OUTOF10: 7
PAINLEVEL_OUTOF10: 4
PAINLEVEL_OUTOF10: 7
PAINLEVEL_OUTOF10: 0
PAINLEVEL_OUTOF10: 0
PAINLEVEL_OUTOF10: 8
PAINLEVEL_OUTOF10: 0
PAINLEVEL_OUTOF10: 8
PAINLEVEL_OUTOF10: 0

## 2019-05-19 ASSESSMENT — PULMONARY FUNCTION TESTS
PIF_VALUE: 12
PIF_VALUE: 11
PIF_VALUE: 16
PIF_VALUE: 21
PIF_VALUE: 22
PIF_VALUE: 13
PIF_VALUE: 19
PIF_VALUE: 14
PIF_VALUE: 12
PIF_VALUE: 12
PIF_VALUE: 34
PIF_VALUE: 32
PIF_VALUE: 21
PIF_VALUE: 16
PIF_VALUE: 10
PIF_VALUE: 13
PIF_VALUE: 12

## 2019-05-19 NOTE — PROGRESS NOTES
[address] [facility]    PROGRESS NOTE             5/19/2019    8:14 AM    Name:   Mariela Worley  MRN:     [id number]     Acct:      [de-identified]   Room:   2002/2002-01  IP Day:  45  Admit Date:  4/11/2019  2:48 PM    PCP:  Obie Ferreira DO  Code Status:  Full Code    Subjective:     C/C:   Chief Complaint   Patient presents with    Abdominal Pain     Interval History Status: not changed    Patient seen and examined, discussed with RN. Patient had 5L output over last 24 hours, failed intubation weaning trial this AM after 5 minutes, had tachycardia to >150. Afib seen on monitor, EKG ordered. Brief History:     POD#3 S/p open ashley, ex lap, R colectomy w/ ileocolic anastomosis, excision small bowel diverticulum, extensive enterolysis. Intubated on vent, low pressor requirement, worsening edema, on hydrocortisone and albumin support. Review of Systems:     Review of Systems   Unable to perform ROS: Intubated     Medications: Allergies:     Allergies   Allergen Reactions    Penicillins Shortness Of Breath and Rash    Amoxicillin        Current Meds:   Scheduled Meds:    methylPREDNISolone  40 mg Intravenous Daily    furosemide  20 mg Intravenous BID    fat emulsion  100 mL Intravenous Daily    insulin lispro  0-6 Units Subcutaneous Q6H    sodium chloride flush  10 mL Intravenous 2 times per day    enoxaparin  30 mg Subcutaneous Daily    pantoprazole  40 mg Intravenous Daily    And    sodium chloride (PF)  10 mL Intravenous Daily    ipratropium-albuterol  1 ampule Inhalation Q4H WA    meperidine  25 mg Intravenous Once    alteplase  1 mg Intracatheter Once    meropenem  1 g Intravenous Q8H    mometasone-formoterol  2 puff Inhalation BID     Continuous Infusions:    PN-Adult 2-in-1 Central Line (Standard) 65 mL/hr at 05/18/19 1733    dextrose      midazolam 3 mg/hr (05/18/19 1718)    lactated ringers 10 mL/hr at 05/17/19 191    norepinephrine Stopped (19 1117)     PRN Meds: metoprolol, glucose, dextrose, glucagon (rDNA), dextrose, sodium phosphate IVPB **OR** sodium phosphate IVPB, sodium chloride flush, ondansetron, HYDROmorphone **OR** HYDROmorphone, potassium chloride, magnesium sulfate    Data:     Past Medical History:   has a past medical history of Abnormal computed tomography of cervical spine, CVA (cerebral vascular accident) ([address] GERD (gastroesophageal reflux disease), Hypertension, Paraproteinemia, and Weight loss. Social History:   reports that she has been smoking. She has a 30.00 pack-year smoking history. She has never used smokeless tobacco. She reports that she drank about 16.8 oz of alcohol per week. She reports that she does not use drugs. Family History: History reviewed. No pertinent family history. Vitals:  BP (!) 147/50   Pulse 147   Temp 98.8 °F (37.1 °C) (Oral)   Resp 29   Ht 5' (1.524 m)   Wt 105 lb 2.6 oz (47.7 kg)   SpO2 96%   BMI 20.54 kg/m²   Temp (24hrs), Av.8 °F (37.1 °C), Min:98 °F (36.7 °C), Max:99.9 °F (37.7 °C)    Recent Labs     19  0008 19  1143 19  1648 19  0155   POCGLU 169* 145* 144* 129*       I/O(24Hr):     Intake/Output Summary (Last 24 hours) at 2019 0814  Last data filed at 2019 0533  Gross per 24 hour   Intake 3749.84 ml   Output 8560 ml   Net -4810.16 ml       Labs:    CBC:   Lab Results   Component Value Date    WBC 9.1 2019    RBC 2.95 2019    RBC 3.66 2012    HGB 8.5 2019    HCT 24.9 2019    MCV 84.2 2019    MCH 28.7 2019    MCHC 34.1 2019    RDW 15.7 2019     2019     2012    MPV 8.4 2019     CBC with Differential:    Lab Results   Component Value Date    WBC 9.1 2019    RBC 2.95 2019    RBC 3.66 2012    HGB 8.5 2019    HCT 24.9 2019     2019     2012    MCV 84.2 2019 Radiology:    Ct Abdomen Pelvis Wo Contrast Additional Contrast? Oral    Result Date: 4/14/2019  EXAMINATION: CT OF THE ABDOMEN AND PELVIS WITHOUT CONTRAST 4/14/2019 7:34 pm TECHNIQUE: CT of the abdomen and pelvis was performed without the administration of intravenous contrast. Multiplanar reformatted images are provided for review. Dose modulation, iterative reconstruction, and/or weight based adjustment of the mA/kV was utilized to reduce the radiation dose to as low as reasonably achievable. COMPARISON: 04/11/2019 HISTORY: ORDERING SYSTEM PROVIDED HISTORY: ABDOMINAL PAIN TECHNOLOGIST PROVIDED HISTORY: Water soluble contrast only please Ordering Physician Provided Reason for Exam: Abdominal pain - Vented patient. Contrast given via nurse through NG tube. Acuity: Unknown Type of Exam: Unknown Relevant Medical/Surgical History: Hx - Sepsis due to urinary tract infection. FINDINGS: Lower Chest: New moderate layering bilateral pleural effusions with bilateral lower lobe atelectasis. Organs: Limited evaluation due lack of intravenous contrast.  Cholelithiasis redemonstrated. No gallbladder wall thickening or biliary ductal dilatation. Scattered tiny hypodense lesions in the liver are too small to characterize but statistically represent benign cysts or hemangiomas and appear unchanged. The pancreas, spleen, adrenal glands, and kidneys are unremarkable. There is no hydronephrosis or urinary tract calculus. GI/Bowel: The stomach is distended. Enteric tube is in place. No contrast is seen distal to the pylorus and there is contrast reflux into the distal esophagus. There is no evidence of bowel obstruction. The appendix is not definitely visualized. No focal pericecal inflammatory changes are evident. Pelvis: The urinary bladder is decompressed by Tran catheter. No pelvic mass is seen. Peritoneum/Retroperitoneum: Small amount of free fluid in the pelvic cavity. No free air or focal fluid collection.   No basilar opacity persists with blunting of the left costophrenic angle with small effusion on the left suspected. Findings of COPD and suspected chronic fibrotic change are noted. No extrapleural air. Tubes and lines as above. Xr Chest (single View Frontal)    Result Date: 5/2/2019  EXAMINATION: SINGLE XRAY VIEW OF THE CHEST 5/2/2019 6:34 am COMPARISON: 29 April 2019 HISTORY: ORDERING SYSTEM PROVIDED HISTORY: COPD TECHNOLOGIST PROVIDED HISTORY: COPD Ordering Physician Provided Reason for Exam: COPD Acuity: Acute Type of Exam: Initial FINDINGS: AP portable view of the chest time stamped at 630 hours demonstrates stable cardiac size. Overlying cardiac monitoring electrodes are present. Right-sided PICC line terminates in the right atrium. Bipolar pacemaker enters from the left with intact leads in appropriate positions. There is been no significant interval change in bilateral interstitial opacities, representing interval change since 2015. This may be related to worsening interstitial disease or superimposed venous congestion. Bilateral effusions are noted with bibasilar opacities, either atelectasis or edema. Continued bilateral effusions, bibasilar opacities and bilateral interstitial opacities in the upper lobes. Findings may be related to worsening interstitial disease and edema. Airspace disease is not excluded. Xr Chest (single View Frontal)    Result Date: 4/13/2019  EXAMINATION: SINGLE XRAY VIEW OF THE CHEST 4/13/2019 7:18 am COMPARISON: April 12, 2019 HISTORY: ORDERING SYSTEM PROVIDED HISTORY: dyspnea TECHNOLOGIST PROVIDED HISTORY: dyspnea Ordering Physician Provided Reason for Exam: dyspnea Acuity: Acute Type of Exam: Subsequent/Follow-up Additional signs and symptoms: dyspnea FINDINGS: A right IJ catheter is seen with its tip terminating at the superior cavoatrial junction. The left chest wall pacemaker and leads are stable. The cardiomediastinal silhouette is stable.   There is interval increased opacity at the right lung base, may be related to atelectasis versus pneumonia. There is small atelectasis and mild pleural effusion at the left lung base. There is no pneumothorax. There is no acute osseous abnormality. Interval increased opacity at the right lung base, may be related to atelectasis versus pneumonia. Small atelectasis and mild pleural effusion at the left lung, new since the prior study. Xr Chest Standard (2 Vw)    Result Date: 4/29/2019  EXAMINATION: TWO VIEWS OF THE CHEST 4/29/2019 8:04 pm COMPARISON: 04/27/2019 HISTORY: ORDERING SYSTEM PROVIDED HISTORY: Follow-up lung infiltrate TECHNOLOGIST PROVIDED HISTORY: Follow-up lung infiltrate Ordering Physician Provided Reason for Exam: Follow-up infiltrate. Pt unable to lean forward - unable to get proper sponge behind pt for lateral. Pt unable to raise left arm at all for lateral. Best possible lateral obtained. Acuity: Unknown Type of Exam: Unknown Additional signs and symptoms: Follow-up infiltrate. Pt unable to lean forward - unable to get proper sponge behind pt for lateral. Pt unable to raise left arm at all for lateral. Best possible lateral obtained. FINDINGS: Left chest cardiac pacemaker device is in place. Right IJ central venous catheter in place with distal tip at the cavoatrial junction. Heart size is within normal limits. No vascular congestion. There are small to moderate pleural effusions. There are interstitial opacities in the upper lungs, which could be related to scarring and/or developing infiltrate, slightly improved in appearance when compared to 04/27/2019. No evidence of pneumothorax. 1.  Small to moderate bilateral pleural effusions, better visualized on lateral view. 2.  Upper lobe interstitial opacities, slightly improved when compared to the previous study, possibly related to underlying fibrotic change or residual infiltrate.      Xr Knee Left (1-2 Views)    Result Date: 5/8/2019  EXAMINATION: 2 XRAY VIEWS OF THE LEFT KNEE 5/7/2019 11:19 am COMPARISON: Left knee radiographs 03/30/2019. HISTORY: ORDERING SYSTEM PROVIDED HISTORY: fracture TECHNOLOGIST PROVIDED HISTORY: fracture Ordering Physician Provided Reason for Exam: left knee/distal femur pain Acuity: Acute Type of Exam: Initial FINDINGS: Bones are osteopenic. No suprapatellar joint effusion. There is a impacted, transverse fracture of the distal femoral metadiaphysis. Increased sclerosis along the fracture line suggests interval healing. Fracture fragments are in unchanged, near anatomic alignment. No acute dislocation. No new fracture is seen. Impacted, transverse fracture of the distal femoral metadiaphysis is in unchanged alignment and demonstrates interval healing. Xr Abdomen (kub) (single Ap View)    Result Date: 5/10/2019  EXAMINATION: ONE SUPINE XRAY VIEW(S) OF THE ABDOMEN 5/10/2019 9:14 am COMPARISON: Small-bowel series 05/09/2019 HISTORY: ORDERING SYSTEM PROVIDED HISTORY: Small bowel obstruction ([address] TECHNOLOGIST PROVIDED HISTORY: TO ACCOMPANY SMALL BOWEL EXAM SMALL BOWEL OBSTRUCTION Ordering Physician Provided Reason for Exam: SMALL BOWEL FOLLOW THRU - 20 HOUR DELAYED FILM FINDINGS: Significant interval decreased amount of retained contrast in the stomach compared to last image from earlier small bowel series which likely relates to suctioning of the contrast.  Majority of previously seen contrast within the pelvis likely in the rectum is no longer visualized and has likely passed through. Residual contrast remains within the colon and small bowel. NG tube is in place with side hole in the region of the GE junction. Partially visualized mild left pleural effusion associated atelectasis. 1. Interval presumed suctioning of contrast from the stomach which now appears relatively empty. Continued progression of contrast through the small and large bowel as described.  2. NG tube side hole at the level of the GE junction, consider advancement of 2-3 cm. Xr Abdomen (kub) (single Ap View)    Result Date: 5/8/2019  EXAMINATION: SINGLE SUPINE XRAY VIEW(S) OF THE ABDOMEN 5/8/2019 8:59 am COMPARISON: CT abdomen from 05/05/2019, and KUB from 04/19/2019 HISTORY: ORDERING SYSTEM PROVIDED HISTORY: recheck obstruction TECHNOLOGIST PROVIDED HISTORY: recheck obstruction Ordering Physician Provided Reason for Exam: recheck obstruction Acuity: Unknown Type of Exam: Unknown FINDINGS: Again noted cholecystostomy tube, electrode leads from a pacemaker overlying the heart, and overlying electrode leads/tubing. NG tube no longer demonstrated. Gas-filled bowel loops without marked dilatation; cecum measures 8 cm, slightly increased as compared to the previous study. No obvious free air. No mass or organomegaly. Bones unchanged. Retrocardiac opacity, hyperinflated lungs and probable pleural effusions. NG tube removed. Gas-filled bowel more suggestive ileus; no clear cut obstruction. Cecum measures 8 cm. Cholecystostomy tube in unchanged position. Additional unchanged findings, as above. RECOMMENDATION: Continued clinical correlation and follow-up     Xr Abdomen (kub) (single Ap View)    Result Date: 4/19/2019  EXAMINATION: SINGLE SUPINE XRAY VIEW(S) OF THE ABDOMEN 4/19/2019 9:48 am COMPARISON: April 14, 2019 HISTORY: ORDERING SYSTEM PROVIDED HISTORY: pain TECHNOLOGIST PROVIDED HISTORY: pain Ordering Physician Provided Reason for Exam: Abdominal pain and distentsion Acuity: Acute Type of Exam: Initial Additional signs and symptoms: Abdominal pain and distentsion FINDINGS: Enteric tube with the tip within the stomach. Small left pleural effusion. Minimal right pleural effusion. Mildly dilated loops of bowel. Nonspecific bowel gas pattern with mildly dilated loops of bowel.      Xr Abdomen (kub) (single Ap View)    Result Date: 4/14/2019  EXAMINATION: SINGLE SUPINE XRAY VIEW(S) OF THE ABDOMEN 4/14/2019 7:39 am COMPARISON: CT abdomen and pelvis  film from 11 April 2019 HISTORY: [address] [facility] [address]: Abd Distention TECHNOLOGIST PROVIDED HISTORY: Abd Distention Ordering Physician Provided Reason for Exam: Abdominal distention. Pt was moving around in the bed. Best films at present time. Acuity: Acute Type of Exam: Initial Additional signs and symptoms: Abdominal distention. Pt was moving around in the bed. Best films at present time. FINDINGS: Portable view time stamped at 748 hours demonstrates an intestinal tube terminating in the midportion of a gaseous Spike dilated stomach. Densities are present over the stomach likely medication. Bipolar pacemaker is in situ with intact leads. Heart size is top-normal, stable. Gaseous distension of the stomach and loop of bowel in the upper mid abdomen is noted but there is gas and fecal material in the rectum. Gastric outlet obstruction or proximal small bowel partial obstruction is suspected. No free air is noted. Midline city is present over the pelvis likely a monitor or CT small bore catheter. Vascular calcification is present in the pelvis. Persistent preferential gaseous distension of the stomach although there is some gas in the upper abdomen and gas and fecal material present in the rectosigmoid. Findings suggest gastric outlet obstruction. Ct Abdomen W Contrast Additional Contrast? Radiologist Recommendation    Result Date: 5/5/2019  EXAMINATION: CT ABDOMEN WITH CONTRAST, 5/5/2019 3:38 pm TECHNIQUE: CT of the abdomen was performed with the administration of intravenous contrast. Multiplanar reformatted images are provided for review. Dose modulation, iterative reconstruction, and/or weight based adjustment of the mA/kV was utilized to reduce the radiation dose to as low as reasonably achievable. COMPARISON: April 14, 2019 HISTORY: ORDERING SYSTEM PROVIDED HISTORY: Check for abscess/fluid collection around ashley tube site.  TECHNOLOGIST PROVIDED active bleed in the next 20 hours, additional images can be acquired. Kate Fry Panarmando Device Plmt/replace/removal    Result Date: 4/30/2019  PROCEDURE: ULTRASOUND GUIDED VASCULAR ACCESS. FLUOROSCOPY GUIDED PICC PLACEMENT 4/30/2019. HISTORY: ORDERING SYSTEM PROVIDED HISTORY: TPN TECHNOLOGIST PROVIDED HISTORY: TPN Lumen?->Double Lumen SEDATION: None FLUOROSCOPY DOSE AND TYPE OR TIME AND EXPOSURES: 7 seconds; D  TECHNIQUE: This procedure was performed by Dr. Tiana Rosen. Informed consent was obtained after a detailed explanation of the procedure including risks, benefits, and alternatives. Universal protocol was observed. The right arm was prepped and draped in sterile fashion using maximum sterile barrier technique. Local anesthesia was achieved with lidocaine. A micropuncture needle was used to access the right basilic vein using ultrasound guidance. An ultrasound image demonstrating patency of the vein with needle tip located within it. An image was obtained and stored in PACs. A 0.018 guidewire was used to place a peel-a-way sheath and a 5 [place] dual-lumen PICC was advanced with fluoroscopic guidance with the tip at the cavo-atrial junction. The catheter flushed easily and there was a good blood return. The catheter was secured to the skin. The patient tolerated the procedure well and there were no immediate complications. FINDINGS: Fluoroscopic image demonstrates the tip of the catheter at the cavo-atrial junction. Successful ultrasound and fluoroscopy guided PICC placement     Us Gallbladder Ruq    Result Date: 4/19/2019  EXAMINATION: RIGHT UPPER QUADRANT ULTRASOUND 4/19/2019 10:48 am COMPARISON: CT abdomen and pelvis 4/14/2018 HISTORY: ORDERING SYSTEM PROVIDED HISTORY: ABDOMINAL PAIN FINDINGS: Pancreas is not well visualized. No liver lesion. Gallbladder wall thickening. Gallbladder sludge. Cholelithiasis. Pericholecystic fluid.  Common bile duct measures at the upper limits of normal at 7 mm.     Findings suggesting acute cholecystitis. Xr Chest Portable    Result Date: 5/10/2019  EXAMINATION: ONE XRAY VIEW OF THE CHEST 5/10/2019 1:28 am COMPARISON: May 2, 2019. HISTORY: ORDERING SYSTEM PROVIDED HISTORY: low o2 sat TECHNOLOGIST PROVIDED HISTORY: low o2 sat Ordering Physician Provided Reason for Exam: Low o2 sat Acuity: Acute Type of Exam: Initial FINDINGS: Hyperexpanded right lung volume. Left greater than right basilar heterogeneous opacities with left basilar consolidation. Small bilateral pleural effusions. Stable cardiomediastinal silhouette and great vessels. Enteric contrast in the stomach and distal esophagus. Enteric tube courses into the stomach but tip is not definitely seen due to contrast in the stomach. Stable left pectoral cardiac pacer device. Stable right PICC. Left greater than right basilar heterogeneous opacities with left basilar consolidation. Differential considerations include atelectasis and an infectious/inflammatory process. Small bilateral pleural effusions. Enteric contrast in the stomach and distal esophagus. Enteric tube courses into the stomach but tip is not definitely seen due to contrast in the stomach. Xr Chest Portable    Result Date: 4/27/2019  EXAMINATION: SINGLE XRAY VIEW OF THE CHEST 4/27/2019 8:47 am COMPARISON: 04/26/2019 HISTORY: ORDERING SYSTEM PROVIDED HISTORY: Assess for pulm edema vs PNA TECHNOLOGIST PROVIDED HISTORY: Assess for pulm edema vs PNA Ordering Physician Provided Reason for Exam: cough, congestion Acuity: Acute Type of Exam: Initial FINDINGS: Right IJ central venous catheter is in place tip in the SVC right atrial junction. Pacer wires are in place. The heart and mediastinal structures are stable. Pleural effusions are present with bibasilar atelectasis. Bilateral lung infiltrates are present in the upper lung fields.      Persistent pleural effusions with bibasilar atelectasis and bilateral lung infiltrates with areas of consolidation developing in the upper lobes. Xr Chest Portable    Result Date: 4/26/2019  EXAMINATION: SINGLE XRAY VIEW OF THE CHEST 4/26/2019 6:51 am COMPARISON: April 24, 2019 HISTORY: ORDERING SYSTEM PROVIDED HISTORY: Pleural effusions TECHNOLOGIST PROVIDED HISTORY: Pleural effusions Ordering Physician Provided Reason for Exam: pleural effusion Acuity: Acute Type of Exam: Initial FINDINGS: Right IJ line in good position. Pacer device is stable. Cardiac leads overlie the chest.  Stable cardiomediastinal contours with calcified aortic arch. Left effusion and associated airspace disease, slightly decreased. Chronic blunting of right costophrenic sulcus may represent scarring or chronic effusion. Increased reticular markings right upper chest and left upper chest possibly interstitial edema or interstitial pneumonia. No new airspace consolidation. Increasing reticular markings upper chest bilaterally right greater than left possibly due to edema or interstitial pneumonitis. Improving left effusion with associated retrocardiac airspace disease. Pleural-parenchymal scarring or effusion in the right lung base, stable. Xr Chest Portable    Result Date: 4/24/2019  EXAMINATION: SINGLE XRAY VIEW OF THE CHEST 4/24/2019 6:05 am COMPARISON: Chest radiograph dated 04/22/2019 HISTORY: ORDERING SYSTEM PROVIDED HISTORY: Acute respiratory failure, pleural effusion TECHNOLOGIST PROVIDED HISTORY: Acute respiratory failure, pleural effusion Ordering Physician Provided Reason for Exam: pleural effusion, respiratory failure. Acuity: Acute Type of Exam: Initial FINDINGS: Heart size is normal.  Dual-chamber pacemaker placed via left subclavian approach. Right internal jugular central line has tip in distal superior vena cava. Interval removal of nasogastric tube. No interval change of infiltrate and atelectasis at the left lung base. Stable mild infiltrate in the left upper lobe.   Mild interval improvement of infiltrate at the right lung base. Stable small bilateral pleural effusions, left larger than right. Mild CHF, stable. 1.  No interval change of infiltrate and atelectasis at the left lung base. Stable mild infiltrate in the left upper lobe. 2.  Mild interval improvement of infiltrate at the right lung base. 3.  Stable small bilateral pleural effusions, left larger than right. 4.  Mild CHF, stable. Xr Chest Portable    Result Date: 4/22/2019  EXAMINATION: SINGLE XRAY VIEW OF THE CHEST 4/22/2019 6:44 am COMPARISON: April 21, 2019. HISTORY: ORDERING SYSTEM PROVIDED HISTORY: Hypoxia and CHF TECHNOLOGIST PROVIDED HISTORY: Hypoxia and CHF Ordering Physician Provided Reason for Exam: hx of hypoxia/CHF Acuity: Acute Type of Exam: Initial FINDINGS: Right IJ central venous catheter appears in unchanged position. Nasogastric tube courses below the diaphragm, with tip not imaged. Left chest wall pacemaker device projects in unchanged position. Cardiac and mediastinal contours are enlarged but unchanged. Unchanged bilateral pleural effusions and bibasilar pulmonary opacities. Unchanged findings suggestive of congestive heart failure. No evidence of pneumothorax. No new osseous abnormalities. 1. No significant change in findings suggestive of congestive heart failure. 2. Unchanged bilateral pleural effusions and bibasilar pulmonary consolidations. RECOMMENDATION: Radiographic follow-up to complete resolution. Xr Chest Portable    Result Date: 4/21/2019  EXAMINATION: SINGLE XRAY VIEW OF THE CHEST 4/21/2019 3:08 pm COMPARISON: April 19, 2019 HISTORY: ORDERING SYSTEM PROVIDED HISTORY: hypoxia TECHNOLOGIST PROVIDED HISTORY: hypoxia Ordering Physician Provided Reason for Exam: hypoxia Acuity: Unknown Type of Exam: Unknown FINDINGS: Enteric tube in the stomach. ET tube is been removed. Right IJ catheter terminates in the atrial caval junction. Bipolar pacer on the left unchanged. Heart and mediastinum unremarkable. Moderate edema unchanged. Small effusions, left greater than right. Bony thorax intact. Status post extubation. Life support apparatus otherwise stable. Moderate edema and effusions unchanged. Xr Chest Portable    Result Date: 4/19/2019  EXAMINATION: SINGLE XRAY VIEW OF THE CHEST 4/19/2019 5:51 am COMPARISON: April 18, 2019 HISTORY: ORDERING SYSTEM PROVIDED HISTORY: ETT placement TECHNOLOGIST PROVIDED HISTORY: ETT placement Ordering Physician Provided Reason for Exam: Vent Pt check ETT placement Acuity: Acute Type of Exam: Subsequent/Follow-up Additional signs and symptoms: Vent Pt check ETT placement FINDINGS: Tubes and lines are unchanged. Persistent bibasilar lung opacities and pleural effusions. Mild pulmonary edema. No significant interval change. Xr Chest Portable    Result Date: 4/18/2019  EXAMINATION: SINGLE XRAY VIEW OF THE CHEST 4/18/2019 6:21 am COMPARISON: April 17, 2019 HISTORY: ORDERING SYSTEM PROVIDED HISTORY: ETT placement TECHNOLOGIST PROVIDED HISTORY: ETT placement Ordering Physician Provided Reason for Exam: on vent Acuity: Acute Type of Exam: Initial FINDINGS: Right IJ line and NG tube are stable. Interval advancement of ET tube terminating 3.1 cm from ron. Implanted cardiac device is present. Left-sided effusion and associated airspace disease is stable. Hazy right basilar opacity possibly edema or atelectasis. No pneumothorax. Increased opacity left upper chest possibly focal area of atelectasis, new from prior. Advanced ETT from prior exam, now 3.1 cm from ron. Increased opacity left upper chest suspicious for atelectasis. Additional supporting devices are stable.      Xr Chest Portable    Result Date: 4/17/2019  EXAMINATION: SINGLE XRAY VIEW OF THE CHEST 4/17/2019 7:15 am COMPARISON: 16 April 2019 HISTORY: ORDERING SYSTEM PROVIDED HISTORY: ETT placement TECHNOLOGIST PROVIDED HISTORY: ETT placement Ordering Physician Provided Reason for Exam: on vent Acuity: Acute Type of Exam: Initial FINDINGS: AP portable view of the chest time stamped at 613 hours demonstrates overlying cardiac monitoring electrodes. A right internal jugular catheter terminates in the superior vena cava. Endotracheal tube is somewhat high riding terminating 6.4 cm above the ron. Intestinal tube extends beyond the body of the stomach, tip not included on the exam.  Bipolar pacemaker enters from the left with intact leads in appropriate positions. Heart size is normal.  Left pleural effusion is noted there is continued volume loss in the left hemithorax with stable mild vascular congestion, perihilar opacities, and faint opacity in the right mid and lower lung field. Underlying COPD is noted. Osseous structures are stable. There is little change from prior study. Findings of COPD, mild central vascular congestion, left effusion, and scattered opacities favoring interstitial edema with multifocal pneumonitis not excluded. Tubes and lines as above. However, endotracheal tube is high riding. The findings were sent to the Radiology Results [address] at 7:58 am on 4/17/2019to be communicated to a licensed caregiver. RECOMMENDATION: Suggest advancement of endotracheal tube. Xr Chest Portable    Result Date: 4/16/2019  EXAMINATION: SINGLE XRAY VIEW OF THE CHEST 4/16/2019 6:54 am COMPARISON: 04/15/2019, 610 hours HISTORY: ORDERING SYSTEM PROVIDED HISTORY: ETT placement TECHNOLOGIST PROVIDED HISTORY: ETT placement Ordering Physician Provided Reason for Exam: on vent Acuity: Acute Type of Exam: Initial 17-year-old female on ventilator; check endotracheal tube placement FINDINGS: Portable AP upright view of the chest. Endotracheal tube distal tip overlying the mid trachea approximately 4.1 cm above the level of the ron. Enteric tube traverses the GE junction with distal tip excluded from the field of view.  Left subclavian approach cardiac pacemaker device distal lead tips relatively stable in position. Right internal jugular approach central venous catheter distal tip overlying the high right atrium, stable. Cardiac monitor leads overlie the chest. Atherosclerotic calcification of the thoracic aorta. Slight stable volume loss of the left hemithorax. No pneumothorax. No free air. Dense retrocardiac/left basilar airspace consolidation and small left-sided pleural effusion. Stable mild focal opacity at the right mid lung zone. Underlying COPD. Stable mild pulmonary vascular congestion and left-sided predominant parahilar opacity. Visualized osseous structures remain unchanged. 1. Stable multifocal airspace disease as detailed above with dense retrocardiac/left basilar airspace consolidation and small left-sided pleural effusion. Mild pulmonary vascular congestion. Findings may represent edema or multifocal pneumonia. 2. Underlying COPD. 3. Tubes and line as detailed above. Xr Chest Portable    Result Date: 4/15/2019  EXAMINATION: SINGLE XRAY VIEW OF THE CHEST 4/15/2019 6:47 am COMPARISON: 14 April 2019 HISTORY: ORDERING SYSTEM PROVIDED HISTORY: ETT placement TECHNOLOGIST PROVIDED HISTORY: ETT placement Ordering Physician Provided Reason for Exam: on vent Acuity: Acute Type of Exam: Initial FINDINGS: AP portable view of the chest time stamped at 612 hours demonstrates overlying cardiac monitoring electrodes. Endotracheal tube terminates 4 cm above the ron. Bipolar pacemaker enters from the left with intact leads in appropriate positions. Intestinal tube extends beyond the fundus of the stomach, tip not included. Right internal jugular catheter terminates at the cavoatrial junction. Heart size is normal.  Aortic arch is calcified. There is interval improvement in vascular congestion with resolution of perihilar opacities. Some bibasilar opacities remain. No extrapleural air is noted. Osseous structures are stable.      Interval improvement in vascular congestion and bilateral opacities consistent with resolving pulmonary edema. Tubes and lines as above. Xr Chest Portable    Result Date: 4/14/2019  EXAMINATION: SINGLE XRAY VIEW OF THE CHEST 4/14/2019 7:57 am COMPARISON: Portable chest 04/13/2019. HISTORY: ORDERING SYSTEM PROVIDED HISTORY: Intubation TECHNOLOGIST PROVIDED HISTORY: Intubation Ordering Physician Provided Reason for Exam: intubation Acuity: Acute Type of Exam: Initial Additional signs and symptoms: intubation FINDINGS: Endotracheal tube terminates over the midthoracic trachea. Dual-chamber pacemaker leads appear unchanged in position. Right IJ approach central venous catheter unchanged in position. Heart size not substantially changed. Perihilar and basilar opacities further increased. Left pleural effusion increased in size. Findings may reflect pulmonary edema, progressed from yesterday's exam.  Left pleural effusion increased in size. Vl Upper Extremity Bilateral Venous Duplex    Result Date: 4/22/2019    [facility]  Vascular Upper Extremities Veins Procedure   Patient Name   Juana Ortega     Date of Study           04/22/2019                 Wilhelmenia Oppenheim   Date of Birth  1950  Gender                  Female   Age            77 year(s)  Race                       Room Number    2002        Height:                 59.84 inch, 152 cm   Corporate ID # [id number]    Weight:                 102 pounds, 46.3 kg   Patient Acct # [de-identified]   BSA:        1.4 m^2     BMI:       20.03 kg/m^2   MR #           [id number]      Sonographer             Roderick Mario   Accession #    [id number]   Interpreting Physician  Shayy Stevenson   Referring                  Referring Physician     Shannon Hartmann *  Nurse  Practitioner  Procedure Type of Study:   Veins: Upper Extremities Veins, Venous Scan Upper Bilateral.  Indications for Study:Swelling. Patient Status: In Patient. Technical Quality:Limited visualization. Limitation reason:Line placements. +------------------------------------+----------+---------------+----------+ ! Cephalic at [address]                      ! Yes       ! Yes            ! None      ! +------------------------------------+----------+---------------+----------+ Doppler Measurements +-------------------------+-----------------------+------------------------+ ! Location                 ! Signal                 !Reflux                  ! +-------------------------+-----------------------+------------------------+ ! SCV                      ! Phasic                 ! No                      ! +-------------------------+-----------------------+------------------------+ ! Axillary                 ! Phasic                 ! No                      ! +-------------------------+-----------------------+------------------------+ ! Brachial                 !Phasic                 ! No                      ! +-------------------------+-----------------------+------------------------+ Left UE Vein Measurements 2D Measurements +------------------------------------+----------+---------------+----------+ ! Location                            ! Visualized! Compressibility! Thrombosis! +------------------------------------+----------+---------------+----------+ ! Prox IJV                            ! Yes       ! Yes            ! None      ! +------------------------------------+----------+---------------+----------+ ! Dist IJV                            ! Yes       ! Yes            ! None      ! +------------------------------------+----------+---------------+----------+ ! Prox SCV                            ! Yes       ! Yes            ! None      ! +------------------------------------+----------+---------------+----------+ ! Dist SCV                            ! Yes       ! Yes            ! None      ! +------------------------------------+----------+---------------+----------+ ! Prox Axillary                       ! Yes       ! Yes            ! None      ! +------------------------------------+----------+---------------+----------+ ! Dist Axillary                       ! Yes       ! Yes            ! None      ! +------------------------------------+----------+---------------+----------+ ! Prox Brachial                       !Yes       ! Yes            ! None      ! +------------------------------------+----------+---------------+----------+ ! Dist Brachial                       !Yes       ! Yes            ! None      ! +------------------------------------+----------+---------------+----------+ ! Prox Radial                         !Yes       ! Yes            ! None      ! +------------------------------------+----------+---------------+----------+ ! Dist Radial                         !Yes       ! Yes            ! None      ! +------------------------------------+----------+---------------+----------+ ! Prox Ulnar                          ! Yes       ! Yes            ! None      ! +------------------------------------+----------+---------------+----------+ ! Dist Ulnar                          ! Yes       ! Yes            ! None      ! +------------------------------------+----------+---------------+----------+ ! Basilic at UA                       ! Yes       ! Yes            ! None      ! +------------------------------------+----------+---------------+----------+ ! Cephalic at UA                      ! Yes       ! Yes            ! None      ! +------------------------------------+----------+---------------+----------+ ! Cephalic at AF                      ! Yes       ! Yes            ! None      ! +------------------------------------+----------+---------------+----------+ Doppler Measurements +-------------------------+-----------------------+------------------------+ ! Location                 ! Signal                 !Reflux                  ! +-------------------------+-----------------------+------------------------+ ! IJV                      ! Phasic                 !                        ! +-------------------------+-----------------------+------------------------+ ! SCV                      ! Phasic                 !                        ! +-------------------------+-----------------------+------------------------+ ! Axillary                 ! Phasic                 !                        ! +-------------------------+-----------------------+------------------------+ ! Brachial                 !Phasic                 !                        ! +-------------------------+-----------------------+------------------------+    Vl Dup Lower Extremity Venous Bilateral    Result Date: 5/7/2019    [facility]  Vascular Lower Extremities DVT Study Procedure   Patient Name   [facility]     Date of Study           05/07/2019                 Lucy Carbajal   Date of Birth  1950  Gender                  Female   Age            77 year(s)  Race                       Room Number    2123        Height:                 59.84 inch, 152 cm   Corporate ID # [id number]    Weight:                 102 pounds, 46.3 kg   Patient Acct # [de-identified]   BSA:        1.4 m^2     BMI:      20.03 kg/m^2   MR #           [id number]      Sonographer             Bennye Severance, T   Accession #    [id number]   Interpreting Physician  Kaye Becerra   Referring                  Referring Physician     River Singh MD  Nurse  Practitioner  Procedure Type of Study:   Veins: Lower Extremities DVT Study, Venous Scan Lower Bilateral.  Indications for Study:Pain and swelling. Patient Status: In Patient. Technical Quality:Limited visualization. Limitation reason:patient movement. Conclusions   Summary   No evidence of superficial or deep venous thrombosis in both lower  extremities. Technically limited visualization.    Signature   ----------------------------------------------------------------  Electronically signed by Bennye Severance, RVT(Sonographer) on  05/07/2019 01:22 PM ----------------------------------------------------------------   ----------------------------------------------------------------  Electronically signed by Linn Oliveira(Interpreting  physician) on 05/07/2019 06:45 PM  ----------------------------------------------------------------  Findings:   Right Impression:                    Left Impression:  The common femoral, femoral,         The common femoral, femoral,  popliteal and tibial veins           popliteal and tibial veins  demonstrate normal compressibility   demonstrate normal compressibility  and augmentation. and augmentation. Limited visualization of the calf    Limited visualization of the calf  veins. veins. Normal compressibility of the great  Normal compressibility of the great  saphenous vein. saphenous vein. Normal compressibility of the small  Normal compressibility of the small  saphenous vein. saphenous vein. Velocities are measured in cm/s ; Diameters are measured in cm Right Lower Extremities DVT Study Measurements Right 2D Measurements +------------------------------------+----------+---------------+----------+ ! Location                            ! Visualized! Compressibility! Thrombosis! +------------------------------------+----------+---------------+----------+ ! Common Femoral                      !Yes       ! Yes            ! None      ! +------------------------------------+----------+---------------+----------+ ! Prox Femoral                        !Yes       ! Yes            ! None      ! +------------------------------------+----------+---------------+----------+ ! Mid Femoral                         !Yes       ! Yes            ! None      ! +------------------------------------+----------+---------------+----------+ ! Dist Femoral                        !Yes       ! Yes            ! None      ! +------------------------------------+----------+---------------+----------+ ! SSV                                 ! Partial   !Yes            ! None      ! +------------------------------------+----------+---------------+----------+    Ir Cholecystostomy Percutaneous Complete    Result Date: 4/22/2019  PROCEDURE: ULTRASOUND and fluoroscopic GUIDED CHOLECYSTOSTOMY TUBE PLACEMENT April 22, 2019 HISTORY: ORDERING SYSTEM PROVIDED HISTORY: acute cholecystitis TECHNOLOGIST PROVIDED HISTORY: acute cholecystitis SEDATION: 75 mcg IV fentanyl for pain TECHNIQUE: Informed consent was obtained after a detailed explanation of the procedure including risks, benefits, and alternatives. Universal protocol was followed. A suitable skin site was prepped and draped in sterile fashion following ultrasound localization. An 18 gauge needle was advanced under ultrasound guidance into the gallbladder via transhepatic route and a 0.035 guidewire was used to place a 8 [place] cholecystostomy tube under fluoroscopic guidance. The catheter was sutured to the skin and the patient tolerated the procedure well. Thick bilious material was sent for laboratory analysis. The catheter was attached to gravity drainage. FINDINGS: Ultrasound image demonstrates distended gallbladder. Bilious fluid was sent for culture. Successful placement of transhepatic 8 Serbian percutaneous cholecystostomy tube. Nm Hepatobiliary Scan W Ejection Fraction    Result Date: 4/20/2019  EXAMINATION: NUCLEAR MEDICINE HEPATOBILIARY SCINTIGRAPHY (HIDA SCAN). 4/20/2019 2:15 pm TECHNIQUE: Approximately 5.6 [id number] Mebrofenin (Choletec) was administered IV. Then, dynamic images of the abdomen were obtained in the anterior projection for 60 mins. A right lateral view was also obtained at 60 mins. Delayed images up to 4 hours were obtained.  COMPARISON: Ultrasound 04/19/2019 HISTORY: ORDERING SYSTEM PROVIDED HISTORY: CHOLECYSTITIS TECHNOLOGIST PROVIDED HISTORY: Ordering Physician Provided Reason for Exam: Cholecystitis Acuity: Unknown Type of Exam: Unknown FINDINGS: Prompt, homogenous uptake by the liver is noted with normal appearance of radiotracer excretion into the biliary system. Clearance of bloodpool activity appears appropriate. Normal small bowel activity is seen. Prominent activity within the common bile duct. The gallbladder is not visualized by the end of the exam.     Absent filling of the gallbladder consistent with acute cholecystitis. Fl Small Bowel Follow Through Only    Result Date: 5/10/2019  EXAMINATION: SMALL BOWEL FOLLOW THROUGH SERIES 5/9/2019 TECHNIQUE: Small bowel follow through series was performed with overhead images. FLUOROSCOPY DOSE AND TYPE OR TIME AND EXPOSURES: No fluoroscopic images obtained. COMPARISON: CT abdomen pelvis 05/05/2019 HISTORY: ORDERING SYSTEM PROVIDED HISTORY: internal hernia TECHNOLOGIST PROVIDED HISTORY: Water soluble contrast only. Study to be done ONLY if NGT is placed by IR internal hernia FINDINGS:  image demonstrates NG tube overlying the left side of the abdomen within the stomach. Surgical drain overlies the right side of the abdomen. There is a nonspecific bowel gas pattern with mild dilated loops of bowel in lower abdomen. Initial images demonstrate filling of the stomach. At 1 hour and 45 minutes no significant contrast is noted in the small bowel. The 4-1/2 hour image demonstrates contrast within loops of bowel within the mid and lower abdomen and pelvis. There appears to be contrast extending to the colon in the right side of the abdomen. Contrast is noted within the pelvis which may be within the bladder or rectum. Significant delay in emptying of the stomach with persistent contrast noted at the 6 hour concha. There is contrast extending into the bowel which appears to be in the colon. Subtle findings are limited. Consider follow-up radiograph of the abdomen in 6-8 hours.      [facility] By Dr Lili Gimenez    Result Date: 5/9/2019  PROCEDURE: XR PLACE NASOGASTRIC TUBE PHYS 5/9/2019 HISTORY: ORDERING SYSTEM PROVIDED HISTORY: ileus TECHNOLOGIST PROVIDED HISTORY: ileus Ordering Physician Provided Reason for Exam: ILEUS Acuity: Unknown Type of Exam: Unknown CONTRAST: None SEDATION: None FLUOROSCOPY DOSE AND TYPE OR TIME AND EXPOSURES: 21 seconds; D AP 46 DESCRIPTION OF PROCEDURE AND FINDINGS: This procedure was performed by Dr. Dixie Sosa. Under intermittent fluoroscopic guidance a 12 Arabic nasogastric tube was placed through the right nostril and directed under fluoroscopy into the stomach. A small amount of air was injected confirming the tip location in the stomach. Partially visualized cholecystostomy tube and pacer wires. Tube was secured in place to the patient's nose with an adhesive dressing. 12 Arabic nasogastric tube placed successfully with its tip in the stomach. Physical Examination:        Physical Exam   Constitutional: No distress. Low Sedated, Levophed 2, does not appear in acute distress, no agitation   HENT:   Head: Normocephalic and atraumatic. Dry mucous membranes   Eyes: Pupils are equal, round, and reactive to light. Conjunctivae are normal. Right eye exhibits no discharge. Neck: Neck supple. Cardiovascular: Intact distal pulses. A.fib on tele, EKG ordered   Pulmonary/Chest: Effort normal. No respiratory distress. She has no wheezes. She has rales (basilar). She exhibits no tenderness. Low PEEP support, not in resp distress. Failed weaning trial this AM after 5 minutes   Abdominal: Soft. Bowel sounds are normal. There is no tenderness. Musculoskeletal: She exhibits edema (B/L upper extremities, up to elbows, improved, +2 - +3). She exhibits no tenderness. Neurological:   Low sedation, patient responds with nodding/shaking head   Skin: Skin is warm and dry. Capillary refill takes less than 2 seconds. Vitals reviewed.      Vitals:    05/19/19 5180 05/19/19 9800 05/19/19 0659 05/19/19 0714   BP:       Pulse: 91 95 101 147   Resp: 23 21 27 29   Temp:       TempSrc:       SpO2: 97% 99% 99% 96%   Weight:       Height:           Assessment:        Primary Problem  Acute respiratory failure [facility])    Active Hospital Problems    Diagnosis Date Noted    [facility] [J96.00] 05/18/2019    Small bowel obstruction ([facility] [K56.609]     Anxiety disorder [F41.9]     Noncompliance [Z91.19]     Anemia [D64.9]     GI bleed [K92.2] 05/03/2019    Hemorrhage of rectum and anus [K62.5]     Centrilobular emphysema ([facility] [J43.2] 04/30/2019    CRP elevated [R79.82]     Elevated procalcitonin [R79.89]     Bandemia [D72.825]     Cholecystitis [K81.9]     Abdominal distention [R14.0]     Elevated CEA [R97.0]     Elevated CA 19-9 level [R97.8]     Urinary retention [R33.9]     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]     Septic shock (HCC) [A41.9, R65.21]     Leukemoid reaction [D72.823]     Aspiration pneumonia of right lower lobe due to vomit ([facility] [J69.0]     MRSA carrier [Z22.322]     Sepsis due to urinary tract infection ([facility] [A41.9, N39.0] 04/11/2019    Cystitis [N30.90] 04/11/2019       Plan:        Acute respiratory failure s/p ex-lap, open cholecystectomy, R colectomy w/ ileocolic anastomosis, extensive enterolysis, POD#4  - Pulm following - intubated on vent, failed weaning trial this AM (tachy, distress)  - RT following  - Versed for sedation (2/2 BP)  - D/c albumin  - Diuresis ~5L over last 24 hours, hold morning dose Lasix (dry membranes)  - CXR - stable congestion, small B/L pleural effusions L>R  - No solu-cortef in hospital, changing to solumedrol and decreasing dose (due to BP increased) - 40mg qd   - Surgery following  - ID Following - Merrem, Vanco    Severe Anemia 2/2 ABLA vs. AOCD  - Hgb 8.5   - s/p 2 units on 5/17, continue to follow  - Monitor for dark/black stools    New-onset A.fib  - EKG+,   - Lopressor   - Cardiology consult   - Cleared by surgery for heparin - panel ordered    DVT PPx  - Lovenox 30mg qd   Dispo  - PT/OT  - [address] [place] [address] for rehab, Claire Rader MD  5/19/2019  8:14 AM         IM Attending    Pt seen and examined today   I have discussed the care of pt , including pertinent history and exam findings,  with the resident. I have reviewed the key elements of all parts of the encounter with the resident. I agree with the assessment, plan and orders as documented by the resident.     Electronically signed by Caroline Muñoz MD on 5/19/2019 at 1:30 PM

## 2019-05-19 NOTE — PROGRESS NOTES
ICU Progress Note (Vent)   [facility]    Patient - Leno Sullivan,  Age - 77 y.o.    - 1950      Room Number -    MRN -  [id number]   [facility]  Date of Admission -  2019  2:48 PM     Follow-up: Acute respiratory failure    Events of Past 24 Hours   On AC 18, 500, + 5 & 28 % FiO2   on Versed drip at 3 mg/h and off Levophed drips  total intake around 80 ml/h including TPN  No Fever or chills , not much tracheal secretions/ clear  Not Much NG drainage ,   Good urine output  Went into rapid Afib, given IV Metoprolol & started on Heparin drip    Vitals    height is 5' (1.524 m) and weight is 105 lb 2.6 oz (47.7 kg). Her oral temperature is 98.5 °F (36.9 °C). Her blood pressure is 126/65 and her pulse is 122. Her respiration is 29 and oxygen saturation is 99%. Temperature Range: Temp: 98.5 °F (36.9 °C) Temp  Av.8 °F (37.1 °C)  Min: 98 °F (36.7 °C)  Max: 99.9 °F (37.7 °C)  BP Range:  Systolic (98UCN), RUU:917 , Min:99 , UIM:574     Diastolic (84MFW), PRADIP:05, Min:31, Max:98    Pulse Range: Pulse  Av.2  Min: 76  Max: 147  Respiration Range: Resp  Av.2  Min: 15  Max: 30  Current Pulse Ox[de-identified]  SpO2: 99 %  24HR Pulse Ox Range:  SpO2  Av.2 %  Min: 96 %  Max: 100 %  Oxygen Amount and Delivery: O2 Flow Rate (L/min): 2 L/min      Wt Readings from Last 3 Encounters:   19 105 lb 2.6 oz (47.7 kg)   19 89 lb (40.4 kg)   19 94 lb 1.6 oz (42.7 kg)     I/O       Intake/Output Summary (Last 24 hours) at 2019 1008  Last data filed at 2019 0700  Gross per 24 hour   Intake 3749.84 ml   Output 8660 ml   Net -4910.16 ml     I/O last 3 completed shifts:   In: 3749.8 [I.V.:1246.3; IV GBTCURRQN:6603]  Out: 2370 [Urine:7450; ZXKKDX:0678]     DRAIN/TUBE OUTPUT:     Invasive Lines   ETT Day -     Lines -      ICP PRESSURE RANGE:  No data recorded  CVP PRESSURE RANGE:  No data recorded  Mechanical Ventilation Data SETTINGS (Comprehensive)  Vent Information  $Ventilation: $Subsequent Day  Ventilator Started: Yes  Ventilation Day(s): 4  Skin Assessment: Clean, dry, & intact  Equipment ID: [id number]  Equipment Changed: HME  Vent Type: Servo i  Vent Mode: PRVC  Vt Ordered: (S) 450 mL  Rate Set: (S) 12 bmp  Pressure Support: 7 cmH20  FiO2 : 28 %  Sensitivity: 5  PEEP/CPAP: 5  I Time/ I Time %: 0.9 s  Cuff Pressure (cm H2O): 22 cm H2O  Humidification Source: HME  Additional Respiratory  Assessments  Pulse: 122  Resp: 29  SpO2: 99 %  End Tidal CO2: 37 (%)  Position: Semi-Diaz's  Humidification Source: HME  Oral Care Completed?: Yes  Oral Care: Mouth swabbed, Mouth suctioned, Mouth moisturizer, Lip moisturizer applied  Subglottic Suction Done?: Yes  Cuff Pressure (cm H2O): 22 cm H2O       ABGs:   Lab Results   Component Value Date    PHART 7.527 05/19/2019    PO2ART 82.7 05/19/2019    UVJ3LYK 42.6 05/19/2019       Lab Results   Component Value Date    MODE PRVC 05/19/2019         Medications   IV   heparin (porcine) 12 Units/kg/hr (05/19/19 0920)    PN-Adult 2-in-1 Central Line (Standard) 65 mL/hr at 05/18/19 1733    dextrose      midazolam 3 mg/hr (05/18/19 1718)    lactated ringers 10 mL/hr at 05/17/19 1917    norepinephrine Stopped (05/18/19 1117)      methylPREDNISolone  40 mg Intravenous Daily    metoprolol  5 mg Intravenous Q4H    furosemide  20 mg Intravenous BID    fat emulsion  100 mL Intravenous Daily    insulin lispro  0-6 Units Subcutaneous Q6H    sodium chloride flush  10 mL Intravenous 2 times per day    pantoprazole  40 mg Intravenous Daily    And    sodium chloride (PF)  10 mL Intravenous Daily    ipratropium-albuterol  1 ampule Inhalation Q4H WA    meperidine  25 mg Intravenous Once    alteplase  1 mg Intracatheter Once    meropenem  1 g Intravenous Q8H    mometasone-formoterol  2 puff Inhalation BID       Diet/Nutrition   Diet NPO Effective Now Exceptions are: Sips with Meds  PN-Adult 2-in-1 Central Line (Standard)    Exam   VITALS    height is 5' (1.524 m) and weight is 105 lb 2.6 oz (47.7 kg). Her oral temperature is 98.5 °F (36.9 °C). Her blood pressure is 126/65 and her pulse is 122. Her respiration is 29 and oxygen saturation is 99%. Ventilator Settings (Basic)  Vent Mode: PRVC Rate Set: (S) 12 bmp/Vt Ordered: (S) 450 mL/ /FiO2 : 28 %    Constitutional - awake alert, on vent  General Appearance  well developed, no acute distress  HEENT - Life support devices in place (ET, NG),normocephalic, atraumatic. PERRLA  Lungs - Chest expands equally, no wheezes, rales , coarse   Cardiovascular - Heart sounds are normal.  Tachycardia and rhythm irregular, no murmur, gallop or rub.   Abdomen - soft, little tender, nondistended,   Neurologic - awake on Versed drip , no restlessness or agitation noted  Skin - no bruising or bleeding  Extremities - no cyanosis, clubbing or edema , positive upper extremities edema    Lab Results   CBC     Lab Results   Component Value Date    WBC 9.1 05/19/2019    RBC 2.95 05/19/2019    RBC 3.66 05/23/2012    HGB 8.5 05/19/2019    HCT 24.9 05/19/2019     05/19/2019     05/23/2012    MCV 84.2 05/19/2019    MCH 28.7 05/19/2019    MCHC 34.1 05/19/2019    RDW 15.7 05/19/2019    METASPCT 2 05/15/2019    LYMPHOPCT 7 05/19/2019    MONOPCT 4 05/19/2019    MYELOPCT 1 05/07/2019    BASOPCT 0 05/19/2019    MONOSABS 0.40 05/19/2019    LYMPHSABS 0.60 05/19/2019    EOSABS 0.00 05/19/2019    BASOSABS 0.00 05/19/2019    DIFFTYPE NOT REPORTED 05/19/2019       BMP   Lab Results   Component Value Date     05/19/2019    K 3.2 05/19/2019     05/19/2019    CO2 31 05/19/2019    BUN 14 05/19/2019    CREATININE <0.40 05/19/2019    GLUCOSE 145 05/19/2019    GLUCOSE 87 05/21/2012    CALCIUM 8.8 05/19/2019       LFTS  Lab Results   Component Value Date    ALKPHOS 62 05/18/2019    ALT 11 05/18/2019    AST 13 05/18/2019    PROT 5.2 05/18/2019    BILITOT 0.37 05/18/2019    BILIDIR 0.21 05/11/2019    IBILI 0.17 05/11/2019    LABALBU 3.5 05/18/2019    LABALBU 4.6 05/17/2012       INR  Recent Labs     05/17/19  0417 05/18/19  0430 05/19/19  0619   PROTIME 16.1* 16.2* 17.7*   INR 1.3 1.3 1.5       APTT  Recent Labs     05/17/19  0417 05/18/19  0430 05/19/19  0619   APTT 36.5* 31.3 31.1       Lactic Acid  Lab Results   Component Value Date    LACTA 1.9 05/16/2019    LACTA 1.8 05/12/2019    LACTA 0.9 05/11/2019        BNP   No results for input(s): BNP in the last 72 hours. Cultures   5/16 blood cultures ×2 are negative ×3 days    Radiology     Plain Films: Decreased left pleural effusion         CT Scans    See actual reports for details    SYSTEM ASSESSMENT      Neuro       Respiratory   Wean oxygen as tolerated. Keep O2 sat > 88%       Hemodynamics       Gastrointestinal/Nutrition       Renal       Infectious Disease       Hematology/Oncology       Endocrine       Social/Spiritual/DNR/Disposition/Other   Acute respiratory failure with hypoxia day # 5 on vent, was on vent prior on this admission and was extubated 8/25  Metabolic alkalosis  Hypotension /improved  New onset rapid A. fib  Leukocytosis? ?  If reactive or a new infection, back to normal, negative blood cultures  Severe COPD  Acute cholecystitis/cholecystostomy tube 4/22 then had ex lap 5/16 with  extensive lysis of adhesions, right colectomy with anastomosis and open cholecystectomy, found to have cholecystostomy tube traversing through the transverse colon  E. coli UTI/aspiration pneumonia / prior this admission  History of CVA with left hemiparesis  Anxiety/depression  Recent GI bleed  Hypervolemia fluid balance -5 L last 24-hour    Plan:    Continue vent support, hopefully more stable tomorrow for weaning trial  Decrease respiratory to 12 and tidal vital to 450  Sedatives,  Protonix and Lovenox   Off pressors   Diuresis/held  Antibiotic per ID, on  meropenem, follow-up cultures:   Bronchodilators, can decrease steroids patient is not wheezing  Discussed with staff    Critical Care Time   > 30 min    Electronically signed by Joel Upton MD on 5/19/2019 at 10:08 AM

## 2019-05-19 NOTE — CONSULTS
[facility] Cardiology Cardiology    Consult              Today's Date: 5/19/2019  Patient Name: Lisandra Barboza  Date of admission: 4/11/2019  2:48 PM  Patient's age: 77 y.o., 1950  Admission Dx: Sepsis due to urinary tract infection ([facility] [address] [A41.9, N39.0]  Cystitis [N30.90]  Cystitis [N30.90]    Reason for  Consult:  Afib RVR    Requesting Physician: Alberto Paris MD    CHIEF COMPLAINT:  Sepsis, organ failure    History Obtained From:  patient    HISTORY OF PRESENT ILLNESS:      The patient is a 77 y.o.  female who is admitted to the hospital for sepsis and complicated medical course. She has been here for more than 1 month, on vent and pressor as recent as yesterday. Cardiology consulted for Afib with RVR , HR has been ranging from 130-140 bpm. Occult stool testing  is positive for blood in the past. Echo with mild LV dysfunction with EF 45%. Past Medical History:   has a past medical history of Abnormal computed tomography of cervical spine, CVA (cerebral vascular accident) ([facility] [address] GERD (gastroesophageal reflux disease), Hypertension, Paraproteinemia, and Weight loss. Past Surgical History:   has a past surgical history that includes pacemaker placement (07/2011); intubation (4/14/2019); Upper gastrointestinal endoscopy (N/A, 4/16/2019); laparoscopy (N/A, 5/15/2019); and Cholecystectomy (N/A, 5/15/2019). Home Medications:    Prior to Admission medications    Medication Sig Start Date End Date Taking? Authorizing Provider   oxyCODONE-acetaminophen (PERCOCET) 5-325 MG per tablet Take 1 tablet by mouth every 6 hours as needed for Pain.    Yes Historical Provider, MD   senna-docusate (PERICOLACE) 8.6-50 MG per tablet Take 1 tablet by mouth 2 times daily   Yes Historical Provider, MD   budesonide-formoterol (SYMBICORT) 160-4.5 MCG/ACT AERO Inhale 2 puffs into the lungs 2 times daily   Yes Historical Provider, MD   sucralfate (CARAFATE) 1 GM/10ML suspension Take 1 g by mouth 4 times daily    Yes smokeless tobacco. She reports that she drank about 16.8 oz of alcohol per week. She reports that she does not use drugs. Family History: family history is not on file. No h/o sudden cardiac death. No for premature CAD    REVIEW OF SYSTEMS:    Negative apart from stated in HPI    PHYSICAL EXAM:      /77   Pulse 116   Temp 98.5 °F (36.9 °C) (Oral)   Resp 29   Ht 5' (1.524 m)   Wt 105 lb 2.6 oz (47.7 kg)   SpO2 99%   BMI 20.54 kg/m²      Intake/Output Summary (Last 24 hours) at 5/19/2019 1143  Last data filed at 5/19/2019 0700  Gross per 24 hour   Intake 3749.84 ml   Output 8660 ml   Net -4910.16 ml     PHYSICAL EXAM:      /77   Pulse 116   Temp 98.5 °F (36.9 °C) (Oral)   Resp 29   Ht 5' (1.524 m)   Wt 105 lb 2.6 oz (47.7 kg)   SpO2 99%   BMI 20.54 kg/m²      HEENT: AXOX3, PERRL  Neck: Supple no JVD  Heart: Normal S1, S2, no murmur regular rhythm  Chest: CTA bilateral  Abdo: Soft not tender normal BS  LE: No edema , palpable pulses bilateral  Neuro: Grossly no focal deficit    DATA:    Diagnostics:    EKG: normal sinus rhythm, RA enlargement. ECHO:  reviewed. Ejection fraction: 45%  Stress Test: not obtained. Cardiac Angiography: not obtained. Labs:     CBC:   Recent Labs     05/18/19  0430 05/19/19  0619   WBC 9.9 9.1   HGB 9.0* 8.5*   HCT 26.5* 24.9*    214     BMP:   Recent Labs     05/18/19  0430  05/18/19  2106 05/19/19  0619     --   --  145*   K 3.1*   < > 3.0* 3.2*   CO2 24  --   --  31   BUN 8  --   --  14   CREATININE <0.40*  --   --  <0.40*   LABGLOM CANNOT BE CALCULATED  --   --  CANNOT BE CALCULATED   GLUCOSE 154*  --   --  145*    < > = values in this interval not displayed. Pro-BNP:  No results for input(s): PROBNP in the last 72 hours. BNP: No results for input(s): BNP in the last 72 hours.   PT/INR:   Recent Labs     05/18/19  0430 05/19/19 0619   PROTIME 16.2* 17.7*   INR 1.3 1.5     APTT:  Recent Labs     05/18/19 0430 05/19/19 0619   APTT 31.3 31.1     CARDIAC ENZYMES:No results for input(s): CKTOTAL, CKMB, CKMBINDEX, TROPONINI in the last 72 hours. Invalid input(s):  TROPONINT  No results for input(s): TROPONINT in the last 72 hours. FASTING LIPID PANEL:  Lab Results   Component Value Date    HDL 32 03/29/2012    TRIG 145 05/17/2019     LIVER PROFILE:  Recent Labs     05/17/19  0417 05/18/19  0430   AST  --  13   ALT  --  11   LABALBU 2.6* 3.5         Patient's Active Problem List  Principal Problem:    Acute respiratory failure (HCC)  Active Problems:    Sepsis due to urinary tract infection (HCC)    Cystitis    Emphysematous cystitis    Septic shock (HCC)    Leukemoid reaction    Aspiration pneumonia of right lower lobe due to vomit (HCC)    MRSA carrier    Urinary retention    Abdominal distention    Elevated CEA    Elevated CA 19-9 level    Cholecystitis    CRP elevated    Elevated procalcitonin    Bandemia    Centrilobular emphysema (HCC)    Hemorrhage of rectum and anus    GI bleed    Anemia    Small bowel obstruction (HCC)    Anxiety disorder    Noncompliance  Resolved Problems:    * No resolved hospital problems. *        IMPRESSION:    1. Afib with RVR  2. Mild LV dysfuction  3. Sepsis and resp failure  4. Anemia and heme positive stool    RECOMMENDATIONS:  1. Start IV amiodarone  2. Check baseline TSH and Free T4  3. IV lopressor as needed for HR control  4. Keep mg>2 and K>4  5. IF still in Afib by tomorrow, anticoagulation with IV heparin should be discussed with GI given h/o positive stool for blood and anemia        Discussed with Nurse.         Luz Maria English MD MSc. [facility]  Cardiology/Electrophysiology  [facility]  [phone]

## 2019-05-19 NOTE — PROGRESS NOTES
Residents notified of new onset a-fib. EKG confirmed. Order for cardiology consult and to check with general surgery regarding heparin gtt.

## 2019-05-19 NOTE — PROGRESS NOTES
Infectious disease Consult Note      Patient: Elo Worley  : 1950  Acct#:  [id number]     Date:  2019    Assessment:   Leukocytosis improving . Aspiration pneumonia . Shock  . Cholecystitis status post cholecystectomy tube  grew Candida non-albicans and one colony of ESBL Klebsiella on culture . finished ABXS course   SBO S/p right colectomy with ileocolic anastomosis,open cholecystectomy and excision of small bowel diverticulum 5/15 . Single positive blood culture on 5/10 STAPHYLOCOCCUS SPECIES, COAGULASE NEGATIVE likely contamination     Ecoli Emphysematous cystitis initially treatedtreated     Aspiration pneumonia of right lower lobe initially     Yeast growth on sputum culture suspect contamination     MRSA carrier    Urinary retention     Anemia GI bleed followed by GI     PCN allergy      History of CVA with left hemiparesis              Recommendations:     Continue IV meropenem  . Follow blood cultures   Follow CBC and renal function  . Continue supportive care . Subjective:       History of Present Illness  Patient is a 77 y.o.  female admitted with Sepsis due to urinary tract infection   who is seen in consult for the same . She presented w dysuria and lower abd pain for around a week PRIOR TO ADMISSION associated with vomiting ,was found hypotensive with leukocytosis . CT suggested emphysematous cystitis,possible gastric outlet obstruction. CXR showed a right lower infiltrate. High CA-19-9,CEA  Allergy to [facility] w SOB   Urine culture  grew ESCHERICHIA COLI resistant to Ampicillin ,sensitive to all other tested ABXS . Blood cultures negative . Mycoplasma IGM 0.97   YEAST MODERATE GROWTH on sputum culture   S/P EGD   with reported esophagitis. GB US Findings suggesting acute cholecystitis. HIIDA showed absent gallbladder filling.    She was extubated on   Status post cholecystectomy tube  by interventional radiology,  cultures on  grew Candida non-albicans and one colony of ESBL Klebsiella. PICC line was placed   Tagged RBC scan  was negative . CT abdomen 5/5 showed no fluid collection ,Bowel obstruction which is suspected to be caused by an internal hernia. NG tube was placed under fluoroscopy 5/9. She had a small bowel follow-through 5/9, developed respiratory failure with hypoxia, tachycardia and tachypnea was transferred to ICU placed on BiPAP, blood pressure on the low side required Levophed,WBC up to 28.5    code status was changed to [facility] on 5/13. S/p right colectomy with ileocolic anastomosis,open cholecystectomy and excision of small bowel diverticulum 5/15 . Interval history :  She is still intubated   She developed rapid atrial fibrillation was started on metoprolol and heparin drips  She is off Levophed, on Versed drip. No reported fever, clear respiratory secretions  On TPN   CXR reviewed and change. Past Medical History:   Diagnosis Date    Abnormal computed tomography of cervical spine     sclerotic bone appearance     CVA (cerebral vascular accident) ([address]     left  side weakness    GERD (gastroesophageal reflux disease)     Hypertension     Paraproteinemia     Weight loss       Past Surgical History:   Procedure Laterality Date    CHOLECYSTECTOMY N/A 5/15/2019    CHOLECYSTECTOMY performed by Faheem Corbett DO at [facility]  4/14/2019         LAPAROSCOPY N/A 5/15/2019    LAPAROSCOPY EXPLORATORY CONVERTED TO EXPLORATORY LAPAROTOMY/ RIGHT COLON RESECTION AND ANASTAMOSIS/ OPEN CHOLECYSTECTOMY/ EXTENSIVE LYSIS OF ADHESIONS [facility]  07/2011    Pacemaker is Medtronic Revo (compatible). Leads placed in 1995 are NOT MRI compatible. Placed at [facility]. V's per Dr. Lucy Gtz can not have an MRI.     UPPER GASTROINTESTINAL ENDOSCOPY N/A 4/16/2019    EGD ESOPHAGOGASTRODUODENOSCOPY @ BEDSIDE  ICU 2002 performed by Billie Craig MD at [address] Admission Meds  No current facility-administered medications on file prior to encounter. Current Outpatient Medications on File Prior to Encounter   Medication Sig Dispense Refill    oxyCODONE-acetaminophen (PERCOCET) 5-325 MG per tablet Take 1 tablet by mouth every 6 hours as needed for Pain.  senna-docusate (PERICOLACE) 8.6-50 MG per tablet Take 1 tablet by mouth 2 times daily      budesonide-formoterol (SYMBICORT) 160-4.5 MCG/ACT AERO Inhale 2 puffs into the lungs 2 times daily      sucralfate (CARAFATE) 1 GM/10ML suspension Take 1 g by mouth 4 times daily       traZODone (DESYREL) 50 MG tablet Take 50 mg by mouth nightly      tiZANidine (ZANAFLEX) 2 MG tablet Take 2 mg by mouth every 8 hours as needed (left knee)       aspirin 81 MG tablet Take 81 mg by mouth daily      clopidogrel (PLAVIX) 75 MG tablet TAKE 1 TABLET DAILY 30 tablet 2    DOCQLACE 100 MG capsule TAKE 1 CAPSULE BY MOUTH IN THE MORNING & IN THE EVENING -DRINK PLENTYOF WATER WHILE TAKING THIS MEDICINE 30 capsule 1    amLODIPine (NORVASC) 10 MG tablet Take 10 mg by mouth daily.  LORazepam (ATIVAN) 0.5 MG tablet Take 0.5 mg by mouth every 6 hours as needed for Anxiety.  therapeutic multivitamin-minerals (THERAGRAN-M) tablet Take 1 tablet by mouth daily.  omeprazole (PRILOSEC) 20 MG capsule Take 20 mg by mouth daily.  venlafaxine (EFFEXOR) 37.5 MG tablet Take 37.5 mg by mouth 3 times daily.  Misc. Devices [facility] [address] wheelchair with left fupper extremity support  Dx: stroke with left hemiparesis.  1 each 0           Allergies  Allergies   Allergen Reactions    Penicillins Shortness Of Breath and Rash    Amoxicillin         Social   Social History     Tobacco Use    Smoking status: Current Some Day Smoker     Packs/day: 1.00     Years: 30.00     Pack years: 30.00    Smokeless tobacco: Never Used   Substance Use Topics    Alcohol use: Not Currently     Alcohol/week: 16.8 oz     Types: 28 Glasses of wine per week                History reviewed. No pertinent family history. Review of Systems  Unable to provide     Tolerating antibiotics. Physical Exam  BP (!) 119/43   Pulse 137   Temp 98.2 °F (36.8 °C) (Oral)   Resp 29   Ht 5' (1.524 m)   Wt 105 lb 2.6 oz (47.7 kg)   SpO2 99%   BMI 20.54 kg/m²           General Appearance:intubated  . Skin: warm and dry, no rash or erythema  Head: normocephalic and atraumatic  Eyes: pupils equal, round  Neck: neck supple and non tender   Pulmonary/Chest: coarse to auscultation bilaterally- with  rhonchi  Cardiovascular: normal rate, regular rhythm, normal S1 and S2, no murmurs. Abdomen: soft,surgical INCISION INTACT ,NO ERYTHEMA  ,MORGAN drain serous   Extremities: no cyanosis, clubbing   Edema   PICC line in place       Data Review:    Recent Labs     05/17/19 0417 05/18/19  0033 05/18/19  0430 05/19/19  0619   WBC 18.6*  --   --  9.9 9.1   HGB 7.4*   < > 8.9* 9.0* 8.5*   HCT 23.5*   < > 27.0* 26.5* 24.9*   MCV 85.2  --   --  84.8 84.2     --   --  203 214    < > = values in this interval not displayed. Recent Labs     05/17/19 0417 05/17/19  1200 05/18/19  0430 05/18/19  1407 05/18/19  2106 05/19/19  0619     --  140  --   --  145*   K 3.2*  --  3.1* 3.2* 3.0* 3.2*     --  104  --   --  100   CO2 22  --  24  --   --  31   PHOS  --  1.5* 2.8  --   --  1.9*   BUN 6*  --  8  --   --  14   CREATININE <0.40*  --  <0.40*  --   --  <0.40*     Recent Labs     05/18/19  0430   AST 13   ALT 11   BILITOT 0.37   ALKPHOS 62     No results for input(s): LIPASE, AMYLASE in the last 72 hours.   Recent Labs     05/17/19 0417 05/18/19  0430 05/19/19  0619   PROTIME 16.1* 16.2* 17.7*   INR 1.3 1.3 1.5       Imaging Studies:                           All appropriate imaging studies and reports reviewed: Yes       Ct Abdomen Pelvis Wo Contrast Additional Contrast? Oral    Result Date: 4/14/2019  EXAMINATION: CT OF THE ABDOMEN AND PELVIS WITHOUT CONTRAST 4/14/2019 7:34 pm TECHNIQUE: CT of the abdomen and pelvis was performed without the administration of intravenous contrast. Multiplanar reformatted images are provided for review. Dose modulation, iterative reconstruction, and/or weight based adjustment of the mA/kV was utilized to reduce the radiation dose to as low as reasonably achievable. COMPARISON: 04/11/2019 HISTORY: ORDERING SYSTEM PROVIDED HISTORY: ABDOMINAL PAIN TECHNOLOGIST PROVIDED HISTORY: Water soluble contrast only please Ordering Physician Provided Reason for Exam: Abdominal pain - Vented patient. Contrast given via nurse through NG tube. Acuity: Unknown Type of Exam: Unknown Relevant Medical/Surgical History: Hx - Sepsis due to urinary tract infection. FINDINGS: Lower Chest: New moderate layering bilateral pleural effusions with bilateral lower lobe atelectasis. Organs: Limited evaluation due lack of intravenous contrast.  Cholelithiasis redemonstrated. No gallbladder wall thickening or biliary ductal dilatation. Scattered tiny hypodense lesions in the liver are too small to characterize but statistically represent benign cysts or hemangiomas and appear unchanged. The pancreas, spleen, adrenal glands, and kidneys are unremarkable. There is no hydronephrosis or urinary tract calculus. GI/Bowel: The stomach is distended. Enteric tube is in place. No contrast is seen distal to the pylorus and there is contrast reflux into the distal esophagus. There is no evidence of bowel obstruction. The appendix is not definitely visualized. No focal pericecal inflammatory changes are evident. Pelvis: The urinary bladder is decompressed by Tran catheter. No pelvic mass is seen. Peritoneum/Retroperitoneum: Small amount of free fluid in the pelvic cavity. No free air or focal fluid collection. No abnormal lymph node. Normal abdominal aortic caliber. Moderate calcific atherosclerosis. Bones/Soft Tissues: No acute osseous abnormality. Diffuse anasarca. Moderate degenerative changes in the lumbar spine. 1. Distended, contrast filled stomach with reflux of contrast into the esophagus. No enteric contrast is seen distal to the pylorus suggesting delayed gastric emptying in the setting of ileus. 2. New moderate layering bilateral pleural effusions with bilateral lower lobe atelectasis. 3. Small amount of nonspecific free fluid in the pelvic cavity. 4. Anasarca. 5. Cholelithiasis. Xr Chest (single View Frontal)    Result Date: 4/13/2019  EXAMINATION: SINGLE XRAY VIEW OF THE CHEST 4/13/2019 7:18 am COMPARISON: April 12, 2019 HISTORY: ORDERING SYSTEM PROVIDED HISTORY: dyspnea TECHNOLOGIST PROVIDED HISTORY: dyspnea Ordering Physician Provided Reason for Exam: dyspnea Acuity: Acute Type of Exam: Subsequent/Follow-up Additional signs and symptoms: dyspnea FINDINGS: A right IJ catheter is seen with its tip terminating at the superior cavoatrial junction. The left chest wall pacemaker and leads are stable. The cardiomediastinal silhouette is stable. There is interval increased opacity at the right lung base, may be related to atelectasis versus pneumonia. There is small atelectasis and mild pleural effusion at the left lung base. There is no pneumothorax. There is no acute osseous abnormality. Interval increased opacity at the right lung base, may be related to atelectasis versus pneumonia. Small atelectasis and mild pleural effusion at the left lung, new since the prior study. Xr Femur Left (min 2 Views)    Result Date: 3/27/2019  EXAMINATION: 4 XRAY VIEWS OF THE LEFT FEMUR; 2 XRAY VIEWS OF THE LEFT KNEE; 3 XRAY VIEWS OF THE LEFT TIBIA AND FIBULA 3/27/2019 7:00 pm COMPARISON: Left hip 11/14/2015. HISTORY: ORDERING SYSTEM PROVIDED HISTORY: pain TECHNOLOGIST PROVIDED HISTORY: pain Ordering Physician Provided Reason for Exam: pt twisted wrong, lt knee pain, unable to straighten knee.  Acuity: Acute Type of Exam: Initial FINDINGS: Left tib-fib. Healed remote distal tibia fracture. Marked osteopenia, likely due to disuse. Xr Knee Left (3 Views)    Result Date: 3/30/2019  EXAMINATION: 3 XRAY VIEWS OF THE LEFT KNEE 3/30/2019 10:58 am COMPARISON: March 27, 2018 HISTORY: ORDERING SYSTEM PROVIDED HISTORY: F/u L knee pain after cast TECHNOLOGIST PROVIDED HISTORY: AP, oblique, lateral F/u L knee pain after cast FINDINGS: Marked osteopenia. Interval casting, which degrades fine osseous detail question cortical offset in the lateral femoral metaphysis. No malalignment identified. No significant joint effusion. Interval casting. Question nondisplaced fracture in the lateral femoral metaphysis. Osteopenia. Xr Tibia Fibula Left (2 Views)    Result Date: 3/27/2019  EXAMINATION: 4 XRAY VIEWS OF THE LEFT FEMUR; 2 XRAY VIEWS OF THE LEFT KNEE; 3 XRAY VIEWS OF THE LEFT TIBIA AND FIBULA 3/27/2019 7:00 pm COMPARISON: Left hip 11/14/2015. HISTORY: ORDERING SYSTEM PROVIDED HISTORY: pain TECHNOLOGIST PROVIDED HISTORY: pain Ordering Physician Provided Reason for Exam: pt twisted wrong, lt knee pain, unable to straighten knee. Acuity: Acute Type of Exam: Initial FINDINGS: Left femur, four views: The bones are diffusely osteopenic. No acute fracture deformity. The hip joint is maintained. The femoral head projects within the acetabulum. No focal soft tissue abnormality is seen. Left knee, two views: The knee is flexed. No acute osseous abnormality. No joint effusion. Joint spaces are not optimally profiled but no significant arthritic changes are apparent. Left tib-fib, three views: There is evidence of a remote healed distal tibia fracture. No acute fracture or dislocation is seen. No focal soft tissue abnormality. No acute osseous abnormality of the left femur. No acute osseous abnormality of the left knee. No acute osseous abnormality of the left tib-fib. Healed remote distal tibia fracture. Marked osteopenia, likely due to disuse.      Brennan Loss Abdomen (kub) (single Ap View)    Result Date: 4/14/2019  EXAMINATION: SINGLE SUPINE XRAY VIEW(S) OF THE ABDOMEN 4/14/2019 7:39 am COMPARISON: CT abdomen and pelvis  film from 11 April 2019 HISTORY: [address]: Abd Distention TECHNOLOGIST PROVIDED HISTORY: Abd Distention Ordering Physician Provided Reason for Exam: Abdominal distention. Pt was moving around in the bed. Best films at present time. Acuity: Acute Type of Exam: Initial Additional signs and symptoms: Abdominal distention. Pt was moving around in the bed. Best films at present time. FINDINGS: Portable view time stamped at 748 hours demonstrates an intestinal tube terminating in the midportion of a gaseous Spike dilated stomach. Densities are present over the stomach likely medication. Bipolar pacemaker is in situ with intact leads. Heart size is top-normal, stable. Gaseous distension of the stomach and loop of bowel in the upper mid abdomen is noted but there is gas and fecal material in the rectum. Gastric outlet obstruction or proximal small bowel partial obstruction is suspected. No free air is noted. Midline city is present over the pelvis likely a monitor or CT small bore catheter. Vascular calcification is present in the pelvis. Persistent preferential gaseous distension of the stomach although there is some gas in the upper abdomen and gas and fecal material present in the rectosigmoid. Findings suggest gastric outlet obstruction. Ct Abdomen Pelvis W Iv Contrast    Result Date: 4/11/2019  EXAMINATION: CT OF THE ABDOMEN AND PELVIS WITH CONTRAST 4/11/2019 5:14 pm TECHNIQUE: CT of the abdomen and pelvis was performed with the administration of intravenous contrast. Multiplanar reformatted images are provided for review. Dose modulation, iterative reconstruction, and/or weight based adjustment of the mA/kV was utilized to reduce the radiation dose to as low as reasonably achievable. COMPARISON: None.  HISTORY: ORDERING SYSTEM PROVIDED HISTORY: Abdominal pain TECHNOLOGIST PROVIDED HISTORY: IV Only Contrast Ordering Physician Provided Reason for Exam: patient c/o abd pain for an hour FINDINGS: Lower Chest: Trace pleural fluid bilaterally is noted. Indwelling cardiac pacemaker is present. Heart size is normal.  Coronary artery calcifications are evident. The esophagus is significantly dilated and fluid-filled. No paraesophageal adenopathy is evident. Organs: The spleen, pancreas, and adrenals are unremarkable. The liver contains hypodensities, likely cysts. The gallbladder is distended and contains a solitary gallstone. The kidneys excrete contrast bilaterally. Extrarenal collecting systems are noted. The ureters are mildly dilated down to the urinary bladder without evidence of intraluminal or ureteral obstructing calculi. GI/Bowel: Marked distention of the stomach is noted; this continues to the pylorus with appearance suggesting partial gastric outlet obstruction. Fluid is present in some small bowel loops and colon distal to the stomach. No small bowel obstruction. Pelvis: Marked distention of the urinary bladder is noted. 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 Distended ureters may be related to reflux or infection. No free pelvic fluid, pelvic or inguinal adenopathy is noted. Peritoneum/Retroperitoneum: No aortic aneurysm. Mild to moderate plaque is noted in the infrarenal abdominal aorta and both proximal iliac arteries. Shotty lymph nodes are present around the aorta in the upper abdomen. No mesenteric adenopathy is noted. Bones/Soft Tissues: Degenerative changes are present in the hips and lower lumbar facets. Estimated biologic radiation dose for this procedure:258.77 mGy/cm2.     1. Dilatation of the thoracic esophagus filled with fluid. No obstructive process is noted. No adjacent enlarged lymph nodes. 2. Trace pleural fluid.  3. Marked distention of the stomach. This appears to extend to the pylorus. Partial gastric outlet obstruction is not excluded. 4. Bilateral dilated ureters without obstructing calculi. Urinary bladder is markedly distended with bladder wall air suggesting emphysematous cystitis. Dilatation of the ureters may be related to reflux or infection. 5. Atherosclerotic disease. 6. Other findings as above. Critical results were called by Dr. Rajwinder Brandon MD to [address] on 4/11/2019 at 17:37. Xr Chest Portable    Result Date: 4/16/2019  EXAMINATION: SINGLE XRAY VIEW OF THE CHEST 4/16/2019 6:54 am COMPARISON: 04/15/2019, 610 hours HISTORY: ORDERING SYSTEM PROVIDED HISTORY: ETT placement TECHNOLOGIST PROVIDED HISTORY: ETT placement Ordering Physician Provided Reason for Exam: on vent Acuity: Acute Type of Exam: Initial 70-year-old female on ventilator; check endotracheal tube placement FINDINGS: Portable AP upright view of the chest. Endotracheal tube distal tip overlying the mid trachea approximately 4.1 cm above the level of the ron. Enteric tube traverses the GE junction with distal tip excluded from the field of view. Left subclavian approach cardiac pacemaker device distal lead tips relatively stable in position. Right internal jugular approach central venous catheter distal tip overlying the high right atrium, stable. Cardiac monitor leads overlie the chest. Atherosclerotic calcification of the thoracic aorta. Slight stable volume loss of the left hemithorax. No pneumothorax. No free air. Dense retrocardiac/left basilar airspace consolidation and small left-sided pleural effusion. Stable mild focal opacity at the right mid lung zone. Underlying COPD. Stable mild pulmonary vascular congestion and left-sided predominant parahilar opacity. Visualized osseous structures remain unchanged.      1. Stable multifocal airspace disease as detailed above with dense retrocardiac/left basilar airspace consolidation and small left-sided Left pleural effusion increased in size. Findings may reflect pulmonary edema, progressed from yesterday's exam.  Left pleural effusion increased in size. Xr Chest Portable    Result Date: 4/12/2019  EXAMINATION: SINGLE XRAY VIEW OF THE CHEST 4/12/2019 5:26 am COMPARISON: November 14, 2015. HISTORY: ORDERING SYSTEM PROVIDED HISTORY: line placement TECHNOLOGIST PROVIDED HISTORY: line placement Ordering Physician Provided Reason for Exam: New right side line placement. Acuity: Acute Type of Exam: Initial Additional signs and symptoms: New right side line placement. FINDINGS: Stable left pectoral trans venous cardiac pacer device. New right IJ central venous catheter with tip near the superior atrial caval junction. Normal lung volume. No new consolidation. Curvilinear radiopacity projecting over the right upper lobe likely represents artifact from a skin fold. No pleural effusion or pneumothorax. Stable cardiomediastinal silhouette and great vessels with redemonstration of atherosclerotic thoracic aorta. New right IJ central venous catheter with tip near the superior atrial caval junction. No pneumothorax. No new consolidation. Thank you for allowing me to participate in the care of your patient. Please feel free to contact me with any questions or concerns.      Mike Durant MD

## 2019-05-19 NOTE — FLOWSHEET NOTE
Patient vented and nodded during prayer;     05/19/19 1326   Encounter Summary   Services provided to: Patient   Referral/Consult From: Palliative Care   Continue Visiting   (5/19/19)   Complexity of Encounter Moderate   Length of Encounter 15 minutes   Spiritual Assessment Completed Yes   Routine   Type Follow up   Spiritual/Gnosticist   Type Spiritual support   Assessment Approachable   Intervention Prayer;Sustaining presence/ Ministry of presence   Outcome Expressed gratitude;Receptive

## 2019-05-19 NOTE — PLAN OF CARE
Nutrition Problem: Inadequate oral intake  Intervention: Food and/or Nutrient Delivery: Continue NPO, Start Tube Feeding, Modify current Parenteral Nutrition  Nutritional Goals: Adequate nutritional intake or provision

## 2019-05-19 NOTE — PROGRESS NOTES
General Surgery Progress Note            PATIENT NAME: Will Steiner     TODAY'S DATE: 5/19/2019, 11:50 AM    SUBJECTIVE:    Pt  Seen and examined. Remains intubated. A-fib noted this morning. OBJECTIVE:   VITALS:  /77   Pulse 116   Temp 98.5 °F (36.9 °C) (Oral)   Resp 29   Ht 5' (1.524 m)   Wt 105 lb 2.6 oz (47.7 kg)   SpO2 99%   BMI 20.54 kg/m²      INTAKE/OUTPUT:      Intake/Output Summary (Last 24 hours) at 5/19/2019 1150  Last data filed at 5/19/2019 0700  Gross per 24 hour   Intake 3749.84 ml   Output 8660 ml   Net -4910.16 ml                 CONSTITUTIONAL:  awake and alert. No acute distress  HEART:   RRR  LUNGS:   Diminished  ABDOMEN:   Abdomen soft, mildly tender, non-distended. Hypoactive BS. Incision intact.  Drain SS  EXTREMITIES:   2+ edema    Data:  CBC with Differential:    Lab Results   Component Value Date    WBC 9.1 05/19/2019    RBC 2.95 05/19/2019    RBC 3.66 05/23/2012    HGB 8.5 05/19/2019    HCT 24.9 05/19/2019     05/19/2019     05/23/2012    MCV 84.2 05/19/2019    MCH 28.7 05/19/2019    MCHC 34.1 05/19/2019    RDW 15.7 05/19/2019    METASPCT 2 05/15/2019    LYMPHOPCT 7 05/19/2019    MONOPCT 4 05/19/2019    MYELOPCT 1 05/07/2019    BASOPCT 0 05/19/2019    MONOSABS 0.40 05/19/2019    LYMPHSABS 0.60 05/19/2019    EOSABS 0.00 05/19/2019    BASOSABS 0.00 05/19/2019    DIFFTYPE NOT REPORTED 05/19/2019     CMP:    Lab Results   Component Value Date     05/19/2019    K 3.2 05/19/2019     05/19/2019    CO2 31 05/19/2019    BUN 14 05/19/2019    CREATININE <0.40 05/19/2019    GFRAA CANNOT BE CALCULATED 05/19/2019    LABGLOM CANNOT BE CALCULATED 05/19/2019    GLUCOSE 145 05/19/2019    GLUCOSE 87 05/21/2012    PROT 5.2 05/18/2019    LABALBU 3.5 05/18/2019    LABALBU 4.6 05/17/2012    CALCIUM 8.8 05/19/2019    BILITOT 0.37 05/18/2019    ALKPHOS 62 05/18/2019    AST 13 05/18/2019    ALT 11 05/18/2019         ASSESSMENT     Principal Problem:    Acute

## 2019-05-19 NOTE — PLAN OF CARE
Problem: Risk for Impaired Skin Integrity  Goal: Tissue integrity - skin and mucous membranes  Description  Structural intactness and normal physiological function of skin and  mucous membranes. 5/19/2019 1650 by Aston Beebe RN  Outcome: Ongoing  5/19/2019 0555 by Jayant More RN  Outcome: Ongoing  Note:   Patient turned and repositioned every 2 hours and as needed for comfort. Skin assessment as charted. No new skin breakdown noted. Intervention: SKIN ASSESSMENT  Note:   No new skin breakdown noted this shift, patient turned every 2 hours      Problem: Falls - Risk of:  Goal: Will remain free from falls  Description  Will remain free from falls  5/19/2019 1650 by Aston Beebe RN  Outcome: Ongoing  Note:   Patient remains free from falls this shift, appropriate safety measures in place   5/19/2019 0555 by Jayant More RN  Outcome: Ongoing  Goal: Absence of physical injury  Description  Absence of physical injury  Outcome: Ongoing     Problem: Infection, Septic Shock:  Goal: Will show no infection signs and symptoms  Description  Will show no infection signs and symptoms  5/19/2019 1650 by Aston Beebe RN  Outcome: Ongoing  5/19/2019 0555 by Jayant More RN  Outcome: Ongoing  Note:   No temps noted throughout shift. Problem: Tissue Perfusion, Altered:  Goal: Circulatory function within specified parameters  Description  Circulatory function within specified parameters  5/19/2019 1650 by Aston Beebe RN  Outcome: Ongoing  5/19/2019 0555 by Jayant More RN  Outcome: Ongoing  Note:   Vital signs remain stable. Problem: Pain:  Goal: Pain level will decrease  Description  Pain level will decrease  5/19/2019 1650 by Aston Beebe RN  Outcome: Ongoing  Note:   Patient medicated for pain this shift   5/19/2019 0555 by Jayant More RN  Outcome: Ongoing  Note:   Pain assessed at regular intervals. Medications administered as requested for comfort.   Pain remains at a tolerable level. Goal: Control of acute pain  Description  Control of acute pain  Outcome: Ongoing  Goal: Control of chronic pain  Description  Control of chronic pain  Outcome: Ongoing     Problem: Nutrition  Goal: Optimal nutrition therapy  Description       5/19/2019 1650 by Vikas Potter RN  Outcome: Ongoing  5/19/2019 1446 by Fanny Mascorro RD, LD  Outcome: Ongoing     Problem: Restraint Use - Nonviolent/Non-Self-Destructive Behavior:  Goal: Absence of restraint indications  Description  Absence of restraint indications    5/19/2019 1650 by Vikas Potter RN  Outcome: Ongoing  Note:   Patient attempts to pull at lines and tubes when unrestrained. Restraints continued   5/19/2019 0555 by Shayy Martines RN  Outcome: Not Met This Shift  Note:   Patient attempts to pull at lines/tubes when restraints are released. Restraints remain in place for patient safety. Goal: Absence of restraint-related injury  Description  Absence of restraint-related injury    5/19/2019 1650 by Vikas Potter RN  Outcome: Ongoing  5/19/2019 0555 by Shayy Martines RN  Outcome: Ongoing  Note:   Restraints released and ROM performed every 2 hours and PRN. No injury noted. Problem: Risk for Impaired Skin Integrity  Goal: Tissue integrity - skin and mucous membranes  Description  Structural intactness and normal physiological function of skin and  mucous membranes. 5/19/2019 1650 by Vikas Potter RN  Outcome: Ongoing  5/19/2019 0555 by Shayy Martines RN  Outcome: Ongoing  Note:   Patient turned and repositioned every 2 hours and as needed for comfort. Skin assessment as charted. No new skin breakdown noted.    Intervention: SKIN ASSESSMENT  Note:   No new skin breakdown noted this shift, patient turned every 2 hours      Problem: Falls - Risk of:  Goal: Will remain free from falls  Description  Will remain free from falls  5/19/2019 1650 by Vikas Potter RN  Outcome: Ongoing  Note:   Patient remains free from Patient attempts to pull at lines/tubes when restraints are released. Restraints remain in place for patient safety. Goal: Absence of restraint-related injury  Description  Absence of restraint-related injury    5/19/2019 1650 by Ghassan Howell RN  Outcome: Ongoing  5/19/2019 0555 by Elizabeth Acharya RN  Outcome: Ongoing  Note:   Restraints released and ROM performed every 2 hours and PRN. No injury noted.

## 2019-05-19 NOTE — CARE COORDINATION
ONGOING DISCHARGE PLAN:    Patient remains on VENT. No family at bedside    LSW following for [facility]. Per Pulmonary they will try wean trial tomorrow if patient is more stable. Remains on IV solu medrol, IV Lasix, Heparin gtt, Versed gtt, levophed gtt, TPN, lipids, IV Merrem,  IV fluids. Will continue to follow for additional discharge needs.     Electronically signed by Rodrigo Servin RN on 5/19/2019 at 10:22 AM

## 2019-05-19 NOTE — CARE COORDINATION
5/19 Call received from Randall at [facility] offering assistance if needed. Phone is [phone] ask for admissions.  Electronically signed by Augusta Grady RN on 5/19/2019 at 11:14 AM

## 2019-05-19 NOTE — PROGRESS NOTES
Patient on weaning trial, -160s. RT notified, switched back to [facility], [facility].

## 2019-05-19 NOTE — PROGRESS NOTES
Residents notified of positive occult blood, no further orders. Continue heparin gtt. Will continue to monitor for signs of bleeding.

## 2019-05-20 ENCOUNTER — APPOINTMENT (OUTPATIENT)
Dept: GENERAL RADIOLOGY | Age: 69
DRG: 853 | End: 2019-05-20
Payer: COMMERCIAL

## 2019-05-20 LAB
ABO/RH: NORMAL
ABSOLUTE EOS #: 0.21 K/UL (ref 0–0.4)
ABSOLUTE IMMATURE GRANULOCYTE: ABNORMAL K/UL (ref 0–0.3)
ABSOLUTE LYMPH #: 1.64 K/UL (ref 1–4.8)
ABSOLUTE MONO #: 0.82 K/UL (ref 0.1–1.3)
ALLEN TEST: ABNORMAL
ANION GAP SERPL CALCULATED.3IONS-SCNC: 7 MMOL/L (ref 9–17)
ANTI-XA UNFRAC HEPARIN: 0.19 IU/L (ref 0.3–0.7)
ANTI-XA UNFRAC HEPARIN: 0.22 IU/L (ref 0.3–0.7)
ANTIBODY SCREEN: NEGATIVE
ARM BAND NUMBER: NORMAL
BASOPHILS # BLD: 0 % (ref 0–2)
BASOPHILS ABSOLUTE: 0 K/UL (ref 0–0.2)
BLD PROD TYP BPU: NORMAL
BLD PROD TYP BPU: NORMAL
BUN BLDV-MCNC: 21 MG/DL (ref 8–23)
BUN/CREAT BLD: ABNORMAL (ref 9–20)
CALCIUM SERPL-MCNC: 8.1 MG/DL (ref 8.6–10.4)
CARBOXYHEMOGLOBIN: 0.7 % (ref 0–5)
CHLORIDE BLD-SCNC: 100 MMOL/L (ref 98–107)
CO2: 33 MMOL/L (ref 20–31)
CREAT SERPL-MCNC: <0.4 MG/DL (ref 0.5–0.9)
CROSSMATCH RESULT: NORMAL
CROSSMATCH RESULT: NORMAL
DIFFERENTIAL TYPE: ABNORMAL
DISPENSE STATUS BLOOD BANK: NORMAL
DISPENSE STATUS BLOOD BANK: NORMAL
EOSINOPHILS RELATIVE PERCENT: 1 % (ref 0–4)
EXPIRATION DATE: NORMAL
FIO2: ABNORMAL
GFR AFRICAN AMERICAN: ABNORMAL ML/MIN
GFR NON-AFRICAN AMERICAN: ABNORMAL ML/MIN
GFR SERPL CREATININE-BSD FRML MDRD: ABNORMAL ML/MIN/{1.73_M2}
GFR SERPL CREATININE-BSD FRML MDRD: ABNORMAL ML/MIN/{1.73_M2}
GLUCOSE BLD-MCNC: 113 MG/DL (ref 65–105)
GLUCOSE BLD-MCNC: 114 MG/DL (ref 65–105)
GLUCOSE BLD-MCNC: 130 MG/DL (ref 65–105)
GLUCOSE BLD-MCNC: 90 MG/DL (ref 70–99)
HCO3 ARTERIAL: 37 MMOL/L (ref 22–26)
HCT VFR BLD CALC: 31 % (ref 36–46)
HCT VFR BLD CALC: 34 % (ref 36–46)
HEMOGLOBIN: 11 G/DL (ref 12–16)
HEMOGLOBIN: 9.9 G/DL (ref 12–16)
IMMATURE GRANULOCYTES: ABNORMAL %
INR BLD: 1.3
LYMPHOCYTES # BLD: 8 % (ref 24–44)
MAGNESIUM: 2.1 MG/DL (ref 1.6–2.6)
MCH RBC QN AUTO: 27.9 PG (ref 26–34)
MCHC RBC AUTO-ENTMCNC: 32 G/DL (ref 31–37)
MCV RBC AUTO: 87.4 FL (ref 80–100)
METHEMOGLOBIN: 0.2 % (ref 0–1.9)
MODE: ABNORMAL
MONOCYTES # BLD: 4 % (ref 1–7)
MORPHOLOGY: ABNORMAL
NEGATIVE BASE EXCESS, ART: ABNORMAL MMOL/L (ref 0–2)
NOTIFICATION TIME: ABNORMAL
NOTIFICATION: ABNORMAL
NRBC AUTOMATED: ABNORMAL PER 100 WBC
O2 DEVICE/FLOW/%: ABNORMAL
O2 SAT, ARTERIAL: 92.3 % (ref 95–98)
OXYHEMOGLOBIN: ABNORMAL % (ref 95–98)
PARTIAL THROMBOPLASTIN TIME: 75.3 SEC (ref 24–36)
PATIENT TEMP: 37
PCO2 ARTERIAL: 48.9 MMHG (ref 35–45)
PCO2, ART, TEMP ADJ: ABNORMAL (ref 35–45)
PDW BLD-RTO: 15.8 % (ref 11.5–14.9)
PEEP/CPAP: 5
PH ARTERIAL: 7.49 (ref 7.35–7.45)
PH, ART, TEMP ADJ: ABNORMAL (ref 7.35–7.45)
PHOSPHORUS: 2.4 MG/DL (ref 2.6–4.5)
PLATELET # BLD: 394 K/UL (ref 150–450)
PLATELET ESTIMATE: ABNORMAL
PMV BLD AUTO: 9 FL (ref 6–12)
PO2 ARTERIAL: 62.2 MMHG (ref 80–100)
PO2, ART, TEMP ADJ: ABNORMAL MMHG (ref 80–100)
POSITIVE BASE EXCESS, ART: 13.6 MMOL/L (ref 0–2)
POTASSIUM SERPL-SCNC: 5 MMOL/L (ref 3.7–5.3)
PROTHROMBIN TIME: 16.6 SEC (ref 11.8–14.6)
PSV: ABNORMAL
PT. POSITION: ABNORMAL
RBC # BLD: 3.54 M/UL (ref 4–5.2)
RBC # BLD: ABNORMAL 10*6/UL
RESPIRATORY RATE: ABNORMAL
SAMPLE SITE: ABNORMAL
SEG NEUTROPHILS: 87 % (ref 36–66)
SEGMENTED NEUTROPHILS ABSOLUTE COUNT: 17.83 K/UL (ref 1.3–9.1)
SET RATE: 12
SODIUM BLD-SCNC: 140 MMOL/L (ref 135–144)
TEXT FOR RESPIRATORY: ABNORMAL
TOTAL HB: ABNORMAL G/DL (ref 12–16)
TOTAL RATE: 18
TRANSFUSION STATUS: NORMAL
TRANSFUSION STATUS: NORMAL
UNIT DIVISION: 0
UNIT DIVISION: 0
UNIT NUMBER: NORMAL
UNIT NUMBER: NORMAL
VT: 450
WBC # BLD: 20.5 K/UL (ref 3.5–11)
WBC # BLD: ABNORMAL 10*3/UL

## 2019-05-20 PROCEDURE — 6360000002 HC RX W HCPCS: Performed by: INTERNAL MEDICINE

## 2019-05-20 PROCEDURE — 2500000003 HC RX 250 WO HCPCS: Performed by: INTERNAL MEDICINE

## 2019-05-20 PROCEDURE — 2580000003 HC RX 258: Performed by: SURGERY

## 2019-05-20 PROCEDURE — 83735 ASSAY OF MAGNESIUM: CPT

## 2019-05-20 PROCEDURE — 82805 BLOOD GASES W/O2 SATURATION: CPT

## 2019-05-20 PROCEDURE — 85520 HEPARIN ASSAY: CPT

## 2019-05-20 PROCEDURE — 82947 ASSAY GLUCOSE BLOOD QUANT: CPT

## 2019-05-20 PROCEDURE — 84100 ASSAY OF PHOSPHORUS: CPT

## 2019-05-20 PROCEDURE — 6370000000 HC RX 637 (ALT 250 FOR IP): Performed by: INTERNAL MEDICINE

## 2019-05-20 PROCEDURE — 85014 HEMATOCRIT: CPT

## 2019-05-20 PROCEDURE — 85018 HEMOGLOBIN: CPT

## 2019-05-20 PROCEDURE — 94770 HC ETCO2 MONITOR DAILY: CPT

## 2019-05-20 PROCEDURE — 80048 BASIC METABOLIC PNL TOTAL CA: CPT

## 2019-05-20 PROCEDURE — 36415 COLL VENOUS BLD VENIPUNCTURE: CPT

## 2019-05-20 PROCEDURE — 94003 VENT MGMT INPAT SUBQ DAY: CPT

## 2019-05-20 PROCEDURE — 85610 PROTHROMBIN TIME: CPT

## 2019-05-20 PROCEDURE — 2580000003 HC RX 258: Performed by: INTERNAL MEDICINE

## 2019-05-20 PROCEDURE — 2000000000 HC ICU R&B

## 2019-05-20 PROCEDURE — 71045 X-RAY EXAM CHEST 1 VIEW: CPT

## 2019-05-20 PROCEDURE — 2500000003 HC RX 250 WO HCPCS: Performed by: STUDENT IN AN ORGANIZED HEALTH CARE EDUCATION/TRAINING PROGRAM

## 2019-05-20 PROCEDURE — 2500000003 HC RX 250 WO HCPCS: Performed by: SURGERY

## 2019-05-20 PROCEDURE — 6360000002 HC RX W HCPCS: Performed by: SURGERY

## 2019-05-20 PROCEDURE — 6370000000 HC RX 637 (ALT 250 FOR IP): Performed by: SURGERY

## 2019-05-20 PROCEDURE — 2700000000 HC OXYGEN THERAPY PER DAY

## 2019-05-20 PROCEDURE — 85025 COMPLETE CBC W/AUTO DIFF WBC: CPT

## 2019-05-20 PROCEDURE — 94640 AIRWAY INHALATION TREATMENT: CPT

## 2019-05-20 PROCEDURE — 36600 WITHDRAWAL OF ARTERIAL BLOOD: CPT

## 2019-05-20 PROCEDURE — 97110 THERAPEUTIC EXERCISES: CPT

## 2019-05-20 PROCEDURE — 6360000002 HC RX W HCPCS: Performed by: STUDENT IN AN ORGANIZED HEALTH CARE EDUCATION/TRAINING PROGRAM

## 2019-05-20 PROCEDURE — 99233 SBSQ HOSP IP/OBS HIGH 50: CPT | Performed by: INTERNAL MEDICINE

## 2019-05-20 PROCEDURE — C9113 INJ PANTOPRAZOLE SODIUM, VIA: HCPCS | Performed by: SURGERY

## 2019-05-20 PROCEDURE — 94761 N-INVAS EAR/PLS OXIMETRY MLT: CPT

## 2019-05-20 PROCEDURE — 85730 THROMBOPLASTIN TIME PARTIAL: CPT

## 2019-05-20 RX ORDER — FENTANYL CITRATE 50 UG/ML
50 INJECTION, SOLUTION INTRAMUSCULAR; INTRAVENOUS EVERY 30 MIN PRN
Status: DISCONTINUED | OUTPATIENT
Start: 2019-05-20 | End: 2019-05-24

## 2019-05-20 RX ORDER — METOPROLOL TARTRATE 5 MG/5ML
5 INJECTION INTRAVENOUS EVERY 4 HOURS PRN
Status: DISCONTINUED | OUTPATIENT
Start: 2019-05-20 | End: 2019-05-21

## 2019-05-20 RX ORDER — IPRATROPIUM BROMIDE AND ALBUTEROL SULFATE 2.5; .5 MG/3ML; MG/3ML
1 SOLUTION RESPIRATORY (INHALATION) EVERY 4 HOURS
Status: DISCONTINUED | OUTPATIENT
Start: 2019-05-20 | End: 2019-05-23

## 2019-05-20 RX ORDER — LORAZEPAM 2 MG/ML
2 INJECTION INTRAMUSCULAR EVERY 30 MIN PRN
Status: DISCONTINUED | OUTPATIENT
Start: 2019-05-20 | End: 2019-05-31 | Stop reason: HOSPADM

## 2019-05-20 RX ADMIN — HYDROMORPHONE HYDROCHLORIDE 0.5 MG: 1 INJECTION, SOLUTION INTRAMUSCULAR; INTRAVENOUS; SUBCUTANEOUS at 03:33

## 2019-05-20 RX ADMIN — HEPARIN SODIUM 1450 UNITS: 5000 INJECTION INTRAVENOUS; SUBCUTANEOUS at 01:22

## 2019-05-20 RX ADMIN — IPRATROPIUM BROMIDE AND ALBUTEROL SULFATE 1 AMPULE: .5; 3 SOLUTION RESPIRATORY (INHALATION) at 14:26

## 2019-05-20 RX ADMIN — PANTOPRAZOLE SODIUM 40 MG: 40 INJECTION, POWDER, FOR SOLUTION INTRAVENOUS at 08:01

## 2019-05-20 RX ADMIN — LORAZEPAM 2 MG: 2 INJECTION INTRAMUSCULAR; INTRAVENOUS at 16:34

## 2019-05-20 RX ADMIN — MIDAZOLAM 5 MG/HR: 5 INJECTION INTRAMUSCULAR; INTRAVENOUS at 03:35

## 2019-05-20 RX ADMIN — METOPROLOL TARTRATE 5 MG: 1 INJECTION, SOLUTION INTRAVENOUS at 03:36

## 2019-05-20 RX ADMIN — FUROSEMIDE 20 MG: 10 INJECTION, SOLUTION INTRAMUSCULAR; INTRAVENOUS at 10:26

## 2019-05-20 RX ADMIN — LORAZEPAM 2 MG: 2 INJECTION INTRAMUSCULAR; INTRAVENOUS at 10:26

## 2019-05-20 RX ADMIN — FENTANYL CITRATE 50 MCG: 50 INJECTION INTRAMUSCULAR; INTRAVENOUS at 14:04

## 2019-05-20 RX ADMIN — Medication 10 ML: at 08:01

## 2019-05-20 RX ADMIN — HEPARIN SODIUM 1450 UNITS: 5000 INJECTION INTRAVENOUS; SUBCUTANEOUS at 08:03

## 2019-05-20 RX ADMIN — FUROSEMIDE 20 MG: 10 INJECTION, SOLUTION INTRAMUSCULAR; INTRAVENOUS at 18:02

## 2019-05-20 RX ADMIN — FENTANYL CITRATE 50 MCG: 50 INJECTION INTRAMUSCULAR; INTRAVENOUS at 17:24

## 2019-05-20 RX ADMIN — AMIODARONE HYDROCHLORIDE 0.5 MG/MIN: 1.8 INJECTION, SOLUTION INTRAVENOUS at 03:33

## 2019-05-20 RX ADMIN — IPRATROPIUM BROMIDE AND ALBUTEROL SULFATE 1 AMPULE: .5; 3 SOLUTION RESPIRATORY (INHALATION) at 23:28

## 2019-05-20 RX ADMIN — IPRATROPIUM BROMIDE AND ALBUTEROL SULFATE 1 AMPULE: .5; 3 SOLUTION RESPIRATORY (INHALATION) at 07:00

## 2019-05-20 RX ADMIN — MEROPENEM 1 G: 1 INJECTION, POWDER, FOR SOLUTION INTRAVENOUS at 18:01

## 2019-05-20 RX ADMIN — IPRATROPIUM BROMIDE AND ALBUTEROL SULFATE 1 AMPULE: .5; 3 SOLUTION RESPIRATORY (INHALATION) at 19:25

## 2019-05-20 RX ADMIN — MEROPENEM 1 G: 1 INJECTION, POWDER, FOR SOLUTION INTRAVENOUS at 01:22

## 2019-05-20 RX ADMIN — LORAZEPAM 2 MG: 2 INJECTION INTRAMUSCULAR; INTRAVENOUS at 20:28

## 2019-05-20 RX ADMIN — Medication 2 PUFF: at 20:20

## 2019-05-20 RX ADMIN — FENTANYL CITRATE 50 MCG: 50 INJECTION INTRAMUSCULAR; INTRAVENOUS at 15:12

## 2019-05-20 RX ADMIN — MEROPENEM 1 G: 1 INJECTION, POWDER, FOR SOLUTION INTRAVENOUS at 10:26

## 2019-05-20 RX ADMIN — CALCIUM GLUCONATE: 94 INJECTION, SOLUTION INTRAVENOUS at 18:02

## 2019-05-20 RX ADMIN — FENTANYL CITRATE 50 MCG: 50 INJECTION INTRAMUSCULAR; INTRAVENOUS at 11:59

## 2019-05-20 RX ADMIN — FENTANYL CITRATE 50 MCG: 50 INJECTION INTRAMUSCULAR; INTRAVENOUS at 20:18

## 2019-05-20 RX ADMIN — METHYLPREDNISOLONE SODIUM SUCCINATE 40 MG: 40 INJECTION, POWDER, FOR SOLUTION INTRAMUSCULAR; INTRAVENOUS at 08:02

## 2019-05-20 RX ADMIN — I.V. FAT EMULSION 100 ML: 20 EMULSION INTRAVENOUS at 18:02

## 2019-05-20 RX ADMIN — IPRATROPIUM BROMIDE AND ALBUTEROL SULFATE 1 AMPULE: .5; 3 SOLUTION RESPIRATORY (INHALATION) at 10:39

## 2019-05-20 RX ADMIN — HEPARIN SODIUM AND DEXTROSE 16 UNITS/KG/HR: 10000; 5 INJECTION INTRAVENOUS at 03:34

## 2019-05-20 RX ADMIN — AMIODARONE HYDROCHLORIDE 0.5 MG/MIN: 1.8 INJECTION, SOLUTION INTRAVENOUS at 14:45

## 2019-05-20 ASSESSMENT — PULMONARY FUNCTION TESTS
PIF_VALUE: 11
PIF_VALUE: 18
PIF_VALUE: 20
PIF_VALUE: 18
PIF_VALUE: 17
PIF_VALUE: 18
PIF_VALUE: 8
PIF_VALUE: 17
PIF_VALUE: 9
PIF_VALUE: 17

## 2019-05-20 ASSESSMENT — PAIN SCALES - GENERAL
PAINLEVEL_OUTOF10: 8
PAINLEVEL_OUTOF10: 8
PAINLEVEL_OUTOF10: 5
PAINLEVEL_OUTOF10: 8
PAINLEVEL_OUTOF10: 6
PAINLEVEL_OUTOF10: 0
PAINLEVEL_OUTOF10: 8
PAINLEVEL_OUTOF10: 6
PAINLEVEL_OUTOF10: 8
PAINLEVEL_OUTOF10: 6

## 2019-05-20 ASSESSMENT — PAIN DESCRIPTION - LOCATION
LOCATION: LEG;ARM
LOCATION: ABDOMEN

## 2019-05-20 ASSESSMENT — PAIN DESCRIPTION - PAIN TYPE
TYPE: ACUTE PAIN
TYPE: ACUTE PAIN

## 2019-05-20 ASSESSMENT — PAIN SCALES - WONG BAKER: WONGBAKER_NUMERICALRESPONSE: 8

## 2019-05-20 ASSESSMENT — PAIN DESCRIPTION - ORIENTATION: ORIENTATION: LEFT

## 2019-05-20 NOTE — PROGRESS NOTES
[address] [facility].    PROGRESS NOTE             5/20/2019    8:00 AM    Name:   Lane Lawler  MRN:     [id number]     Acct:      [de-identified]   Room:   2002/2002-01  IP Day:  44  Admit Date:  4/11/2019  2:48 PM    PCP:  Gurinder Livingston DO  Code Status:  Full Code    Subjective:     C/C:   Chief Complaint   Patient presents with    Abdominal Pain     Interval History Status: not changed    Patient seen and examined, discussed with RN and Pulm. Patient is sedated at low level - will focus more on pain control and take off the Versed per Pulm. Patient converted to NSR overnight, continues to be on Heparin gtt, Amio gtt. Did have FOBT+ yesterday with dark stools. Brief History:     S/p open ashley, ex lap, R colectomy w/ ileocolic anastomosis, excision small bowel diverticulum, extensive enterolysis. Intubated on vent, low pressor requirement, worsening edema, on hydrocortisone and albumin support. Review of Systems:     Review of Systems   Unable to perform ROS: Intubated     Medications: Allergies:     Allergies   Allergen Reactions    Penicillins Shortness Of Breath and Rash    Amoxicillin        Current Meds:   Scheduled Meds:    methylPREDNISolone  40 mg Intravenous Daily    metoprolol  5 mg Intravenous Q4H    furosemide  20 mg Intravenous BID    fat emulsion  100 mL Intravenous Daily    insulin lispro  0-6 Units Subcutaneous Q6H    sodium chloride flush  10 mL Intravenous 2 times per day    pantoprazole  40 mg Intravenous Daily    And    sodium chloride (PF)  10 mL Intravenous Daily    ipratropium-albuterol  1 ampule Inhalation Q4H WA    meperidine  25 mg Intravenous Once    alteplase  1 mg Intracatheter Once    meropenem  1 g Intravenous Q8H    mometasone-formoterol  2 puff Inhalation BID     Continuous Infusions:    heparin (porcine) 16 Units/kg/hr (05/20/19 0334)    amiodarone 0.5 mg/min (05/20/19 0333)   Basilio Dwyer PN-Adult 2-in-1 Central Line (Standard) 65 mL/hr at 19 1716    dextrose      midazolam 5 mg/hr (19 0335)    lactated ringers 10 mL/hr at 19 1917    norepinephrine Stopped (19 1117)     PRN Meds: heparin (porcine), heparin (porcine), glucose, dextrose, glucagon (rDNA), dextrose, sodium phosphate IVPB **OR** sodium phosphate IVPB, sodium chloride flush, ondansetron, HYDROmorphone **OR** HYDROmorphone, potassium chloride, magnesium sulfate    Data:     Past Medical History:   has a past medical history of Abnormal computed tomography of cervical spine, CVA (cerebral vascular accident) ([address] GERD (gastroesophageal reflux disease), Hypertension, Paraproteinemia, and Weight loss. Social History:   reports that she has been smoking. She has a 30.00 pack-year smoking history. She has never used smokeless tobacco. She reports that she drank about 16.8 oz of alcohol per week. She reports that she does not use drugs. Family History: History reviewed. No pertinent family history. Vitals:  BP (!) 136/56   Pulse 78   Temp 97.7 °F (36.5 °C) (Axillary)   Resp 21   Ht 5' (1.524 m)   Wt 105 lb 2.6 oz (47.7 kg)   SpO2 100%   BMI 20.54 kg/m²   Temp (24hrs), Av °F (36.7 °C), Min:97.7 °F (36.5 °C), Max:98.5 °F (36.9 °C)    Recent Labs     19  0155 19  1123 19  1650 19  0127   POCGLU 129* 148* 155* 114*       I/O(24Hr):     Intake/Output Summary (Last 24 hours) at 2019 0800  Last data filed at 2019 7507  Gross per 24 hour   Intake 4081.65 ml   Output 3300 ml   Net 781.65 ml       Labs:    CBC:   Lab Results   Component Value Date    WBC 20.5 2019    RBC 3.54 2019    RBC 3.66 2012    HGB 9.9 2019    HCT 31.0 2019    MCV 87.4 2019    MCH 27.9 2019    MCHC 32.0 2019    RDW 15.8 2019     2019     2012    MPV 9.0 2019     CBC with Differential:    Lab Results   Component Value Date    WBC 20.5 05/20/2019    RBC 3.54 05/20/2019    RBC 3.66 05/23/2012    HGB 9.9 05/20/2019    HCT 31.0 05/20/2019     05/20/2019     05/23/2012    MCV 87.4 05/20/2019    MCH 27.9 05/20/2019    MCHC 32.0 05/20/2019    RDW 15.8 05/20/2019    METASPCT 2 05/15/2019    LYMPHOPCT 8 05/20/2019    MONOPCT 4 05/20/2019    MYELOPCT 1 05/07/2019    BASOPCT 0 05/20/2019    MONOSABS 0.82 05/20/2019    LYMPHSABS 1.64 05/20/2019    EOSABS 0.21 05/20/2019    BASOSABS 0.00 05/20/2019    DIFFTYPE NOT REPORTED 05/20/2019     CMP:    Lab Results   Component Value Date     05/20/2019    K 5.0 05/20/2019     05/20/2019    CO2 33 05/20/2019    BUN 21 05/20/2019    CREATININE <0.40 05/20/2019    GFRAA CANNOT BE CALCULATED 05/20/2019    LABGLOM CANNOT BE CALCULATED 05/20/2019    GLUCOSE 90 05/20/2019    GLUCOSE 87 05/21/2012    PROT 5.2 05/18/2019    LABALBU 3.5 05/18/2019    LABALBU 4.6 05/17/2012    CALCIUM 8.1 05/20/2019    BILITOT 0.37 05/18/2019    ALKPHOS 62 05/18/2019    AST 13 05/18/2019    ALT 11 05/18/2019     BMP:    Lab Results   Component Value Date     05/20/2019    K 5.0 05/20/2019     05/20/2019    CO2 33 05/20/2019    BUN 21 05/20/2019    LABALBU 3.5 05/18/2019    LABALBU 4.6 05/17/2012    CREATININE <0.40 05/20/2019    CALCIUM 8.1 05/20/2019    GFRAA CANNOT BE CALCULATED 05/20/2019    LABGLOM CANNOT BE CALCULATED 05/20/2019    GLUCOSE 90 05/20/2019    GLUCOSE 87 05/21/2012     BUN/Creatinine:    Lab Results   Component Value Date    BUN 21 05/20/2019    CREATININE <0.40 05/20/2019     Hepatic Function Panel:    Lab Results   Component Value Date    ALKPHOS 62 05/18/2019    ALT 11 05/18/2019    AST 13 05/18/2019    PROT 5.2 05/18/2019    BILITOT 0.37 05/18/2019    BILIDIR 0.21 05/11/2019    IBILI 0.17 05/11/2019    LABALBU 3.5 05/18/2019    LABALBU 4.6 05/17/2012     Albumin:    Lab Results   Component Value Date    LABALBU 3.5 05/18/2019    LABALBU 4.6 05/17/2012       Lab Results with its tip terminating at the superior cavoatrial junction. The left chest wall pacemaker and leads are stable. The cardiomediastinal silhouette is stable. There is interval increased opacity at the right lung base, may be related to atelectasis versus pneumonia. There is small atelectasis and mild pleural effusion at the left lung base. There is no pneumothorax. There is no acute osseous abnormality. Interval increased opacity at the right lung base, may be related to atelectasis versus pneumonia. Small atelectasis and mild pleural effusion at the left lung, new since the prior study. Xr Chest Standard (2 Vw)    Result Date: 4/29/2019  EXAMINATION: TWO VIEWS OF THE CHEST 4/29/2019 8:04 pm COMPARISON: 04/27/2019 HISTORY: ORDERING SYSTEM PROVIDED HISTORY: Follow-up lung infiltrate TECHNOLOGIST PROVIDED HISTORY: Follow-up lung infiltrate Ordering Physician Provided Reason for Exam: Follow-up infiltrate. Pt unable to lean forward - unable to get proper sponge behind pt for lateral. Pt unable to raise left arm at all for lateral. Best possible lateral obtained. Acuity: Unknown Type of Exam: Unknown Additional signs and symptoms: Follow-up infiltrate. Pt unable to lean forward - unable to get proper sponge behind pt for lateral. Pt unable to raise left arm at all for lateral. Best possible lateral obtained. FINDINGS: Left chest cardiac pacemaker device is in place. Right IJ central venous catheter in place with distal tip at the cavoatrial junction. Heart size is within normal limits. No vascular congestion. There are small to moderate pleural effusions. There are interstitial opacities in the upper lungs, which could be related to scarring and/or developing infiltrate, slightly improved in appearance when compared to 04/27/2019. No evidence of pneumothorax. 1.  Small to moderate bilateral pleural effusions, better visualized on lateral view.  2.  Upper lobe interstitial opacities, slightly improved when compared to the previous study, possibly related to underlying fibrotic change or residual infiltrate. Xr Knee Left (1-2 Views)    Result Date: 5/8/2019  EXAMINATION: 2 XRAY VIEWS OF THE LEFT KNEE 5/7/2019 11:19 am COMPARISON: Left knee radiographs 03/30/2019. HISTORY: ORDERING SYSTEM PROVIDED HISTORY: fracture TECHNOLOGIST PROVIDED HISTORY: fracture Ordering Physician Provided Reason for Exam: left knee/distal femur pain Acuity: Acute Type of Exam: Initial FINDINGS: Bones are osteopenic. No suprapatellar joint effusion. There is a impacted, transverse fracture of the distal femoral metadiaphysis. Increased sclerosis along the fracture line suggests interval healing. Fracture fragments are in unchanged, near anatomic alignment. No acute dislocation. No new fracture is seen. Impacted, transverse fracture of the distal femoral metadiaphysis is in unchanged alignment and demonstrates interval healing. Xr Abdomen (kub) (single Ap View)    Result Date: 5/10/2019  EXAMINATION: ONE SUPINE XRAY VIEW(S) OF THE ABDOMEN 5/10/2019 9:14 am COMPARISON: Small-bowel series 05/09/2019 HISTORY: ORDERING SYSTEM PROVIDED HISTORY: Small bowel obstruction ([address] TECHNOLOGIST PROVIDED HISTORY: TO ACCOMPANY SMALL BOWEL EXAM SMALL BOWEL OBSTRUCTION Ordering Physician Provided Reason for Exam: SMALL BOWEL FOLLOW THRU - 20 HOUR DELAYED FILM FINDINGS: Significant interval decreased amount of retained contrast in the stomach compared to last image from earlier small bowel series which likely relates to suctioning of the contrast.  Majority of previously seen contrast within the pelvis likely in the rectum is no longer visualized and has likely passed through. Residual contrast remains within the colon and small bowel. NG tube is in place with side hole in the region of the GE junction. Partially visualized mild left pleural effusion associated atelectasis.      1. Interval presumed suctioning of contrast from the stomach which now appears relatively empty. Continued progression of contrast through the small and large bowel as described. 2. NG tube side hole at the level of the GE junction, consider advancement of 2-3 cm. Xr Abdomen (kub) (single Ap View)    Result Date: 5/8/2019  EXAMINATION: SINGLE SUPINE XRAY VIEW(S) OF THE ABDOMEN 5/8/2019 8:59 am COMPARISON: CT abdomen from 05/05/2019, and KUB from 04/19/2019 HISTORY: ORDERING SYSTEM PROVIDED HISTORY: recheck obstruction TECHNOLOGIST PROVIDED HISTORY: recheck obstruction Ordering Physician Provided Reason for Exam: recheck obstruction Acuity: Unknown Type of Exam: Unknown FINDINGS: Again noted cholecystostomy tube, electrode leads from a pacemaker overlying the heart, and overlying electrode leads/tubing. NG tube no longer demonstrated. Gas-filled bowel loops without marked dilatation; cecum measures 8 cm, slightly increased as compared to the previous study. No obvious free air. No mass or organomegaly. Bones unchanged. Retrocardiac opacity, hyperinflated lungs and probable pleural effusions. NG tube removed. Gas-filled bowel more suggestive ileus; no clear cut obstruction. Cecum measures 8 cm. Cholecystostomy tube in unchanged position. Additional unchanged findings, as above. RECOMMENDATION: Continued clinical correlation and follow-up     Xr Abdomen (kub) (single Ap View)    Result Date: 4/19/2019  EXAMINATION: SINGLE SUPINE XRAY VIEW(S) OF THE ABDOMEN 4/19/2019 9:48 am COMPARISON: April 14, 2019 HISTORY: ORDERING SYSTEM PROVIDED HISTORY: pain TECHNOLOGIST PROVIDED HISTORY: pain Ordering Physician Provided Reason for Exam: Abdominal pain and distentsion Acuity: Acute Type of Exam: Initial Additional signs and symptoms: Abdominal pain and distentsion FINDINGS: Enteric tube with the tip within the stomach. Small left pleural effusion. Minimal right pleural effusion. Mildly dilated loops of bowel.      Nonspecific bowel gas pattern with mildly dilated loops of bowel. Xr Abdomen (kub) (single Ap View)    Result Date: 4/14/2019  EXAMINATION: SINGLE SUPINE XRAY VIEW(S) OF THE ABDOMEN 4/14/2019 7:39 am COMPARISON: CT abdomen and pelvis  film from 11 April 2019 HISTORY: [address] [facility] [address]: Abd Distention TECHNOLOGIST PROVIDED HISTORY: Abd Distention Ordering Physician Provided Reason for Exam: Abdominal distention. Pt was moving around in the bed. Best films at present time. Acuity: Acute Type of Exam: Initial Additional signs and symptoms: Abdominal distention. Pt was moving around in the bed. Best films at present time. FINDINGS: Portable view time stamped at 748 hours demonstrates an intestinal tube terminating in the midportion of a gaseous Spike dilated stomach. Densities are present over the stomach likely medication. Bipolar pacemaker is in situ with intact leads. Heart size is top-normal, stable. Gaseous distension of the stomach and loop of bowel in the upper mid abdomen is noted but there is gas and fecal material in the rectum. Gastric outlet obstruction or proximal small bowel partial obstruction is suspected. No free air is noted. Midline city is present over the pelvis likely a monitor or CT small bore catheter. Vascular calcification is present in the pelvis. Persistent preferential gaseous distension of the stomach although there is some gas in the upper abdomen and gas and fecal material present in the rectosigmoid. Findings suggest gastric outlet obstruction. Ct Abdomen W Contrast Additional Contrast? Radiologist Recommendation    Result Date: 5/5/2019  EXAMINATION: CT ABDOMEN WITH CONTRAST, 5/5/2019 3:38 pm TECHNIQUE: CT of the abdomen was performed with the administration of intravenous contrast. Multiplanar reformatted images are provided for review.  Dose modulation, iterative reconstruction, and/or weight based adjustment of the mA/kV was utilized to reduce the radiation dose to as low as reasonably suggest active bleeding during study acquisition. No evidence of active GI bleeding during acquisition. RECOMMENDATIONS: If the patient shows hemodynamic signs of an active bleed in the next 20 hours, additional images can be acquired. Kate Calhoun Device Plmt/replace/removal    Result Date: 4/30/2019  PROCEDURE: ULTRASOUND GUIDED VASCULAR ACCESS. FLUOROSCOPY GUIDED PICC PLACEMENT 4/30/2019. HISTORY: ORDERING SYSTEM PROVIDED HISTORY: TPN TECHNOLOGIST PROVIDED HISTORY: TPN Lumen?->Double Lumen SEDATION: None FLUOROSCOPY DOSE AND TYPE OR TIME AND EXPOSURES: 7 seconds; D  TECHNIQUE: This procedure was performed by Dr. Italia Vyas. Informed consent was obtained after a detailed explanation of the procedure including risks, benefits, and alternatives. Universal protocol was observed. The right arm was prepped and draped in sterile fashion using maximum sterile barrier technique. Local anesthesia was achieved with lidocaine. A micropuncture needle was used to access the right basilic vein using ultrasound guidance. An ultrasound image demonstrating patency of the vein with needle tip located within it. An image was obtained and stored in PACs. A 0.018 guidewire was used to place a peel-a-way sheath and a 5 Chinese dual-lumen PICC was advanced with fluoroscopic guidance with the tip at the cavo-atrial junction. The catheter flushed easily and there was a good blood return. The catheter was secured to the skin. The patient tolerated the procedure well and there were no immediate complications. FINDINGS: Fluoroscopic image demonstrates the tip of the catheter at the cavo-atrial junction. Successful ultrasound and fluoroscopy guided PICC placement     Us Gallbladder Ruq    Result Date: 4/19/2019  EXAMINATION: RIGHT UPPER QUADRANT ULTRASOUND 4/19/2019 10:48 am COMPARISON: CT abdomen and pelvis 4/14/2018 HISTORY: ORDERING SYSTEM PROVIDED HISTORY: ABDOMINAL PAIN FINDINGS: Pancreas is not well visualized. No liver lesion. Gallbladder wall thickening. Gallbladder sludge. Cholelithiasis. Pericholecystic fluid. Common bile duct measures at the upper limits of normal at 7 mm. Findings suggesting acute cholecystitis. Xr Chest Portable    Result Date: 5/10/2019  EXAMINATION: ONE XRAY VIEW OF THE CHEST 5/10/2019 1:28 am COMPARISON: May 2, 2019. HISTORY: ORDERING SYSTEM PROVIDED HISTORY: low o2 sat TECHNOLOGIST PROVIDED HISTORY: low o2 sat Ordering Physician Provided Reason for Exam: Low o2 sat Acuity: Acute Type of Exam: Initial FINDINGS: Hyperexpanded right lung volume. Left greater than right basilar heterogeneous opacities with left basilar consolidation. Small bilateral pleural effusions. Stable cardiomediastinal silhouette and great vessels. Enteric contrast in the stomach and distal esophagus. Enteric tube courses into the stomach but tip is not definitely seen due to contrast in the stomach. Stable left pectoral cardiac pacer device. Stable right PICC. Left greater than right basilar heterogeneous opacities with left basilar consolidation. Differential considerations include atelectasis and an infectious/inflammatory process. Small bilateral pleural effusions. Enteric contrast in the stomach and distal esophagus. Enteric tube courses into the stomach but tip is not definitely seen due to contrast in the stomach. Xr Chest Portable    Result Date: 4/27/2019  EXAMINATION: SINGLE XRAY VIEW OF THE CHEST 4/27/2019 8:47 am COMPARISON: 04/26/2019 HISTORY: ORDERING SYSTEM PROVIDED HISTORY: Assess for pulm edema vs PNA TECHNOLOGIST PROVIDED HISTORY: Assess for pulm edema vs PNA Ordering Physician Provided Reason for Exam: cough, congestion Acuity: Acute Type of Exam: Initial FINDINGS: Right IJ central venous catheter is in place tip in the SVC right atrial junction. Pacer wires are in place. The heart and mediastinal structures are stable. Pleural effusions are present with bibasilar atelectasis. Bilateral lung infiltrates are present in the upper lung fields. Persistent pleural effusions with bibasilar atelectasis and bilateral lung infiltrates with areas of consolidation developing in the upper lobes. Xr Chest Portable    Result Date: 4/26/2019  EXAMINATION: SINGLE XRAY VIEW OF THE CHEST 4/26/2019 6:51 am COMPARISON: April 24, 2019 HISTORY: ORDERING SYSTEM PROVIDED HISTORY: Pleural effusions TECHNOLOGIST PROVIDED HISTORY: Pleural effusions Ordering Physician Provided Reason for Exam: pleural effusion Acuity: Acute Type of Exam: Initial FINDINGS: Right IJ line in good position. Pacer device is stable. Cardiac leads overlie the chest.  Stable cardiomediastinal contours with calcified aortic arch. Left effusion and associated airspace disease, slightly decreased. Chronic blunting of right costophrenic sulcus may represent scarring or chronic effusion. Increased reticular markings right upper chest and left upper chest possibly interstitial edema or interstitial pneumonia. No new airspace consolidation. Increasing reticular markings upper chest bilaterally right greater than left possibly due to edema or interstitial pneumonitis. Improving left effusion with associated retrocardiac airspace disease. Pleural-parenchymal scarring or effusion in the right lung base, stable. Xr Chest Portable    Result Date: 4/24/2019  EXAMINATION: SINGLE XRAY VIEW OF THE CHEST 4/24/2019 6:05 am COMPARISON: Chest radiograph dated 04/22/2019 HISTORY: ORDERING SYSTEM PROVIDED HISTORY: Acute respiratory failure, pleural effusion TECHNOLOGIST PROVIDED HISTORY: Acute respiratory failure, pleural effusion Ordering Physician Provided Reason for Exam: pleural effusion, respiratory failure. Acuity: Acute Type of Exam: Initial FINDINGS: Heart size is normal.  Dual-chamber pacemaker placed via left subclavian approach. Right internal jugular central line has tip in distal superior vena cava.   Interval removal of nasogastric tube. No interval change of infiltrate and atelectasis at the left lung base. Stable mild infiltrate in the left upper lobe. Mild interval improvement of infiltrate at the right lung base. Stable small bilateral pleural effusions, left larger than right. Mild CHF, stable. 1.  No interval change of infiltrate and atelectasis at the left lung base. Stable mild infiltrate in the left upper lobe. 2.  Mild interval improvement of infiltrate at the right lung base. 3.  Stable small bilateral pleural effusions, left larger than right. 4.  Mild CHF, stable. Xr Chest Portable    Result Date: 4/22/2019  EXAMINATION: SINGLE XRAY VIEW OF THE CHEST 4/22/2019 6:44 am COMPARISON: April 21, 2019. HISTORY: ORDERING SYSTEM PROVIDED HISTORY: Hypoxia and CHF TECHNOLOGIST PROVIDED HISTORY: Hypoxia and CHF Ordering Physician Provided Reason for Exam: hx of hypoxia/CHF Acuity: Acute Type of Exam: Initial FINDINGS: Right IJ central venous catheter appears in unchanged position. Nasogastric tube courses below the diaphragm, with tip not imaged. Left chest wall pacemaker device projects in unchanged position. Cardiac and mediastinal contours are enlarged but unchanged. Unchanged bilateral pleural effusions and bibasilar pulmonary opacities. Unchanged findings suggestive of congestive heart failure. No evidence of pneumothorax. No new osseous abnormalities. 1. No significant change in findings suggestive of congestive heart failure. 2. Unchanged bilateral pleural effusions and bibasilar pulmonary consolidations. RECOMMENDATION: Radiographic follow-up to complete resolution.      Xr Chest Portable    Result Date: 4/21/2019  EXAMINATION: SINGLE XRAY VIEW OF THE CHEST 4/21/2019 3:08 pm COMPARISON: April 19, 2019 HISTORY: ORDERING SYSTEM PROVIDED HISTORY: hypoxia TECHNOLOGIST PROVIDED HISTORY: hypoxia Ordering Physician Provided Reason for Exam: hypoxia Acuity: Unknown Type of Exam: Unknown FINDINGS: Enteric tube in the stomach. ET tube is been removed. Right IJ catheter terminates in the atrial caval junction. Bipolar pacer on the left unchanged. Heart and mediastinum unremarkable. Moderate edema unchanged. Small effusions, left greater than right. Bony thorax intact. Status post extubation. Life support apparatus otherwise stable. Moderate edema and effusions unchanged. Xr Chest Portable    Result Date: 4/19/2019  EXAMINATION: SINGLE XRAY VIEW OF THE CHEST 4/19/2019 5:51 am COMPARISON: April 18, 2019 HISTORY: ORDERING SYSTEM PROVIDED HISTORY: ETT placement TECHNOLOGIST PROVIDED HISTORY: ETT placement Ordering Physician Provided Reason for Exam: Vent Pt check ETT placement Acuity: Acute Type of Exam: Subsequent/Follow-up Additional signs and symptoms: Vent Pt check ETT placement FINDINGS: Tubes and lines are unchanged. Persistent bibasilar lung opacities and pleural effusions. Mild pulmonary edema. No significant interval change. Xr Chest Portable    Result Date: 4/18/2019  EXAMINATION: SINGLE XRAY VIEW OF THE CHEST 4/18/2019 6:21 am COMPARISON: April 17, 2019 HISTORY: ORDERING SYSTEM PROVIDED HISTORY: ETT placement TECHNOLOGIST PROVIDED HISTORY: ETT placement Ordering Physician Provided Reason for Exam: on vent Acuity: Acute Type of Exam: Initial FINDINGS: Right IJ line and NG tube are stable. Interval advancement of ET tube terminating 3.1 cm from ron. Implanted cardiac device is present. Left-sided effusion and associated airspace disease is stable. Hazy right basilar opacity possibly edema or atelectasis. No pneumothorax. Increased opacity left upper chest possibly focal area of atelectasis, new from prior. Advanced ETT from prior exam, now 3.1 cm from ron. Increased opacity left upper chest suspicious for atelectasis. Additional supporting devices are stable.      Xr Chest Portable    Result Date: 4/17/2019  EXAMINATION: SINGLE XRAY VIEW OF THE CHEST 4/17/2019 7:15 am COMPARISON: 16 April 2019 HISTORY: ORDERING SYSTEM PROVIDED HISTORY: ETT placement TECHNOLOGIST PROVIDED HISTORY: ETT placement Ordering Physician Provided Reason for Exam: on vent Acuity: Acute Type of Exam: Initial FINDINGS: AP portable view of the chest time stamped at 613 hours demonstrates overlying cardiac monitoring electrodes. A right internal jugular catheter terminates in the superior vena cava. Endotracheal tube is somewhat high riding terminating 6.4 cm above the ron. Intestinal tube extends beyond the body of the stomach, tip not included on the exam.  Bipolar pacemaker enters from the left with intact leads in appropriate positions. Heart size is normal.  Left pleural effusion is noted there is continued volume loss in the left hemithorax with stable mild vascular congestion, perihilar opacities, and faint opacity in the right mid and lower lung field. Underlying COPD is noted. Osseous structures are stable. There is little change from prior study. Findings of COPD, mild central vascular congestion, left effusion, and scattered opacities favoring interstitial edema with multifocal pneumonitis not excluded. Tubes and lines as above. However, endotracheal tube is high riding. The findings were sent to the Radiology Results [address] at 7:58 am on 4/17/2019to be communicated to a licensed caregiver. RECOMMENDATION: Suggest advancement of endotracheal tube.      Xr Chest Portable    Result Date: 4/16/2019  EXAMINATION: SINGLE XRAY VIEW OF THE CHEST 4/16/2019 6:54 am COMPARISON: 04/15/2019, 610 hours HISTORY: ORDERING SYSTEM PROVIDED HISTORY: ETT placement TECHNOLOGIST PROVIDED HISTORY: ETT placement Ordering Physician Provided Reason for Exam: on vent Acuity: Acute Type of Exam: Initial 25-year-old female on ventilator; check endotracheal tube placement FINDINGS: Portable AP upright view of the chest. Endotracheal tube distal tip overlying the mid trachea approximately 4.1 cm above the level of the ron. Enteric tube traverses the GE junction with distal tip excluded from the field of view. Left subclavian approach cardiac pacemaker device distal lead tips relatively stable in position. Right internal jugular approach central venous catheter distal tip overlying the high right atrium, stable. Cardiac monitor leads overlie the chest. Atherosclerotic calcification of the thoracic aorta. Slight stable volume loss of the left hemithorax. No pneumothorax. No free air. Dense retrocardiac/left basilar airspace consolidation and small left-sided pleural effusion. Stable mild focal opacity at the right mid lung zone. Underlying COPD. Stable mild pulmonary vascular congestion and left-sided predominant parahilar opacity. Visualized osseous structures remain unchanged. 1. Stable multifocal airspace disease as detailed above with dense retrocardiac/left basilar airspace consolidation and small left-sided pleural effusion. Mild pulmonary vascular congestion. Findings may represent edema or multifocal pneumonia. 2. Underlying COPD. 3. Tubes and line as detailed above. Xr Chest Portable    Result Date: 4/15/2019  EXAMINATION: SINGLE XRAY VIEW OF THE CHEST 4/15/2019 6:47 am COMPARISON: 14 April 2019 HISTORY: ORDERING SYSTEM PROVIDED HISTORY: ETT placement TECHNOLOGIST PROVIDED HISTORY: ETT placement Ordering Physician Provided Reason for Exam: on vent Acuity: Acute Type of Exam: Initial FINDINGS: AP portable view of the chest time stamped at 612 hours demonstrates overlying cardiac monitoring electrodes. Endotracheal tube terminates 4 cm above the ron. Bipolar pacemaker enters from the left with intact leads in appropriate positions. Intestinal tube extends beyond the fundus of the stomach, tip not included. Right internal jugular catheter terminates at the cavoatrial junction. Heart size is normal.  Aortic arch is calcified.   There is interval improvement in vascular congestion with resolution of perihilar opacities. Some bibasilar opacities remain. No extrapleural air is noted. Osseous structures are stable. Interval improvement in vascular congestion and bilateral opacities consistent with resolving pulmonary edema. Tubes and lines as above. Xr Chest Portable    Result Date: 4/14/2019  EXAMINATION: SINGLE XRAY VIEW OF THE CHEST 4/14/2019 7:57 am COMPARISON: Portable chest 04/13/2019. HISTORY: ORDERING SYSTEM PROVIDED HISTORY: Intubation TECHNOLOGIST PROVIDED HISTORY: Intubation Ordering Physician Provided Reason for Exam: intubation Acuity: Acute Type of Exam: Initial Additional signs and symptoms: intubation FINDINGS: Endotracheal tube terminates over the midthoracic trachea. Dual-chamber pacemaker leads appear unchanged in position. Right IJ approach central venous catheter unchanged in position. Heart size not substantially changed. Perihilar and basilar opacities further increased. Left pleural effusion increased in size. Findings may reflect pulmonary edema, progressed from yesterday's exam.  Left pleural effusion increased in size.      Vl Upper Extremity Bilateral Venous Duplex    Result Date: 4/22/2019    [facility]  Vascular Upper Extremities Veins Procedure   Patient Name   [place]      Date of Study           04/22/2019                 Jayshree Mejia   Date of Birth  1950  Gender                  Female   Age            77 year(s)  Race                       Room Number    2002        Height:                 59.84 inch, 152 cm   Corporate ID # [id number]    Weight:                 102 pounds, 46.3 kg   Patient Acct # [de-identified]   BSA:        1.4 m^2     BMI:       20.03 kg/m^2   MR #           [id number]      Sonographer             Roderick Mario   Accession #    [id number]   Interpreting Physician  Tang Rolando   Referring                  Referring Physician     Adama De Souza *  Nurse  Practitioner  Procedure Type of Study:   Veins: Upper Extremities Veins, Venous Scan Upper Bilateral.  Indications for Study:Swelling. Patient Status: In Patient. Technical Quality:Limited visualization. Limitation reason:Line placements. Conclusions   Summary   No evidence of superficial or deep venous thrombosis in both upper  extremities. Signature   ----------------------------------------------------------------  Electronically signed by Roderick Mario(Sonographer) on  04/22/2019 11:46 AM  ----------------------------------------------------------------   ----------------------------------------------------------------  Electronically signed by Genie Oliveira(Interpreting  physician) on 04/22/2019 09:31 PM  ----------------------------------------------------------------  Findings:   Right Impression:                  Left Impression:  Internal jugular vein not          The internal jugular, subclavian,  visualized due to line placement. axillary, brachial, radial, and ulnar                                     veins demonstrate normal  Subclavian, axillary, brachial,    compressibility with phasic Doppler  radial, and ulnar veins            signals. demonstrate normal compressibility  with phasic Doppler signals. Normal compressibility of the cephalic                                     vein. Normal compressibility of the  cephalic vein. Normal compressibility of the basilic                                     vein. Normal compressibility of the  basilic vein. Velocities are measured in cm/s ; Diameters are measured in cm Right UE Vein Measurements 2D Measurements +------------------------------------+----------+---------------+----------+ ! Location                            ! Visualized! Compressibility! Thrombosis! +------------------------------------+----------+---------------+----------+ ! Prox SCV                            ! Yes       ! Yes            ! None      ! +------------------------------------+----------+---------------+----------+ !Dist SCV                            ! Yes       ! Yes            ! None      ! +------------------------------------+----------+---------------+----------+ ! Prox Axillary                       ! Yes       ! Yes            ! None      ! +------------------------------------+----------+---------------+----------+ ! Dist Axillary                       ! Yes       ! Yes            ! None      ! +------------------------------------+----------+---------------+----------+ ! Prox Brachial                       !Yes       ! Yes            ! None      ! +------------------------------------+----------+---------------+----------+ ! Dist Brachial                       !Yes       ! Yes            ! None      ! +------------------------------------+----------+---------------+----------+ ! Prox Radial                         !Yes       ! Yes            ! None      ! +------------------------------------+----------+---------------+----------+ ! Dist Radial                         !Yes       ! Yes            ! None      ! +------------------------------------+----------+---------------+----------+ ! Prox Ulnar                          ! Yes       ! Yes            ! None      ! +------------------------------------+----------+---------------+----------+ ! Dist Ulnar                          ! Yes       ! Yes            ! None      ! +------------------------------------+----------+---------------+----------+ ! Basilic at UA                       ! Yes       ! Yes            ! None      ! +------------------------------------+----------+---------------+----------+ ! Basilic at AF                       ! Yes       ! Yes            ! None      ! +------------------------------------+----------+---------------+----------+ ! Basilic at [address]                       ! Yes       ! Yes            ! None      ! +------------------------------------+----------+---------------+----------+ ! Cephalic at UA                      ! Yes       ! Yes            ! None      ! +------------------------------------+----------+---------------+----------+ ! Prox Axillary                       ! Yes       ! Yes            ! None      ! +------------------------------------+----------+---------------+----------+ ! Dist Axillary                       ! Yes       ! Yes            ! None      ! +------------------------------------+----------+---------------+----------+ ! Prox Brachial                       !Yes       ! Yes            ! None      ! +------------------------------------+----------+---------------+----------+ ! Dist Brachial                       !Yes       ! Yes            ! None      ! +------------------------------------+----------+---------------+----------+ ! Prox Radial                         !Yes       ! Yes            ! None      ! +------------------------------------+----------+---------------+----------+ ! Dist Radial                         !Yes       ! Yes            ! None      ! +------------------------------------+----------+---------------+----------+ ! Prox Ulnar                          ! Yes       ! Yes            ! None      ! +------------------------------------+----------+---------------+----------+ ! Dist Ulnar                          ! Yes       ! Yes            ! None      ! +------------------------------------+----------+---------------+----------+ ! Basilic at UA                       ! Yes       ! Yes            ! None      ! +------------------------------------+----------+---------------+----------+ ! Cephalic at UA                      ! Yes       ! Yes            ! None      ! +------------------------------------+----------+---------------+----------+ ! Cephalic at AF                      ! Yes       ! Yes            ! None      ! +------------------------------------+----------+---------------+----------+ Doppler Measurements +-------------------------+-----------------------+------------------------+ ! Location                 ! Signal                 !Reflux                  ! ----------------------------------------------------------------  Electronically signed by Latricia Ardon RVT(Sonographer) on  05/07/2019 01:22 PM  ----------------------------------------------------------------   ----------------------------------------------------------------  Electronically signed by Zuleima Oliveira(Interpreting  physician) on 05/07/2019 06:45 PM  ----------------------------------------------------------------  Findings:   Right Impression:                    Left Impression:  The common femoral, femoral,         The common femoral, femoral,  popliteal and tibial veins           popliteal and tibial veins  demonstrate normal compressibility   demonstrate normal compressibility  and augmentation. and augmentation. Limited visualization of the calf    Limited visualization of the calf  veins. veins. Normal compressibility of the great  Normal compressibility of the great  saphenous vein. saphenous vein. Normal compressibility of the small  Normal compressibility of the small  saphenous vein. saphenous vein. Velocities are measured in cm/s ; Diameters are measured in cm Right Lower Extremities DVT Study Measurements Right 2D Measurements +------------------------------------+----------+---------------+----------+ ! Location                            ! Visualized! Compressibility! Thrombosis! +------------------------------------+----------+---------------+----------+ ! Common Femoral                      !Yes       ! Yes            ! None      ! +------------------------------------+----------+---------------+----------+ ! Prox Femoral                        !Yes       ! Yes            ! None      ! +------------------------------------+----------+---------------+----------+ ! Mid Femoral                         !Yes       ! Yes            ! None      ! +------------------------------------+----------+---------------+----------+ ! GSV Ankle                           ! Yes       ! Yes            ! None      ! +------------------------------------+----------+---------------+----------+ ! SSV                                 ! Partial   !Yes            ! None      ! +------------------------------------+----------+---------------+----------+    Ir Cholecystostomy Percutaneous Complete    Result Date: 4/22/2019  PROCEDURE: ULTRASOUND and fluoroscopic GUIDED CHOLECYSTOSTOMY TUBE PLACEMENT April 22, 2019 HISTORY: ORDERING SYSTEM PROVIDED HISTORY: acute cholecystitis TECHNOLOGIST PROVIDED HISTORY: acute cholecystitis SEDATION: 75 mcg IV fentanyl for pain TECHNIQUE: Informed consent was obtained after a detailed explanation of the procedure including risks, benefits, and alternatives. Universal protocol was followed. A suitable skin site was prepped and draped in sterile fashion following ultrasound localization. An 18 gauge needle was advanced under ultrasound guidance into the gallbladder via transhepatic route and a 0.035 guidewire was used to place a 8 [place] cholecystostomy tube under fluoroscopic guidance. The catheter was sutured to the skin and the patient tolerated the procedure well. Thick bilious material was sent for laboratory analysis. The catheter was attached to gravity drainage. FINDINGS: Ultrasound image demonstrates distended gallbladder. Bilious fluid was sent for culture. Successful placement of transhepatic 8 [place] percutaneous cholecystostomy tube. Nm Hepatobiliary Scan W Ejection Fraction    Result Date: 4/20/2019  EXAMINATION: NUCLEAR MEDICINE HEPATOBILIARY SCINTIGRAPHY (HIDA SCAN). 4/20/2019 2:15 pm TECHNIQUE: Approximately 5.6 [id number] Mebrofenin (Choletec) was administered IV. Then, dynamic images of the abdomen were obtained in the anterior projection for 60 mins. A right lateral view was also obtained at 60 mins.  Delayed images up to 4 hours were obtained. COMPARISON: Ultrasound 04/19/2019 HISTORY: ORDERING SYSTEM PROVIDED HISTORY: CHOLECYSTITIS TECHNOLOGIST PROVIDED HISTORY: Ordering Physician Provided Reason for Exam: Cholecystitis Acuity: Unknown Type of Exam: Unknown FINDINGS: Prompt, homogenous uptake by the liver is noted with normal appearance of radiotracer excretion into the biliary system. Clearance of bloodpool activity appears appropriate. Normal small bowel activity is seen. Prominent activity within the common bile duct. The gallbladder is not visualized by the end of the exam.     Absent filling of the gallbladder consistent with acute cholecystitis. Fl Small Bowel Follow Through Only    Result Date: 5/10/2019  EXAMINATION: SMALL BOWEL FOLLOW THROUGH SERIES 5/9/2019 TECHNIQUE: Small bowel follow through series was performed with overhead images. FLUOROSCOPY DOSE AND TYPE OR TIME AND EXPOSURES: No fluoroscopic images obtained. COMPARISON: CT abdomen pelvis 05/05/2019 HISTORY: ORDERING SYSTEM PROVIDED HISTORY: internal hernia TECHNOLOGIST PROVIDED HISTORY: Water soluble contrast only. Study to be done ONLY if NGT is placed by IR internal hernia FINDINGS:  image demonstrates NG tube overlying the left side of the abdomen within the stomach. Surgical drain overlies the right side of the abdomen. There is a nonspecific bowel gas pattern with mild dilated loops of bowel in lower abdomen. Initial images demonstrate filling of the stomach. At 1 hour and 45 minutes no significant contrast is noted in the small bowel. The 4-1/2 hour image demonstrates contrast within loops of bowel within the mid and lower abdomen and pelvis. There appears to be contrast extending to the colon in the right side of the abdomen. Contrast is noted within the pelvis which may be within the bladder or rectum. Significant delay in emptying of the stomach with persistent contrast noted at the 6 hour concha.  There is contrast extending into to questions   Skin: Skin is warm and dry. Capillary refill takes less than 2 seconds. Vitals reviewed.      Vitals:    05/20/19 0600 05/20/19 0630 05/20/19 0700 05/20/19 0709   BP: (!) 102/55 113/76 (!) 136/56    Pulse: 71 80 82 78   Resp:    21   Temp:       TempSrc:       SpO2: 99% 98% 97% 100%   Weight:       Height:           Assessment:        Primary Problem  Acute respiratory failure [facility])    Active Hospital Problems    Diagnosis Date Noted    Acute respiratory failure ([facility] [address] [J96.00] 05/18/2019    Small bowel obstruction ([facility] [address] [K56.609]     Anxiety disorder [F41.9]     Noncompliance [Z91.19]     Anemia [D64.9]     GI bleed [K92.2] 05/03/2019    Hemorrhage of rectum and anus [K62.5]     Centrilobular emphysema ([facility] [address] [J43.2] 04/30/2019    CRP elevated [R79.82]     Elevated procalcitonin [R79.89]     Bandemia [D72.825]     Cholecystitis [K81.9]     Abdominal distention [R14.0]     Elevated CEA [R97.0]     Elevated CA 19-9 level [R97.8]     Urinary retention [R33.9]     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]     Septic shock (HCC) [A41.9, R65.21]     Leukemoid reaction [D72.823]     Aspiration pneumonia of right lower lobe due to vomit ([facility] [address] [J69.0]     MRSA carrier [Z22.322]     Sepsis due to urinary tract infection ([facility] [address] [A41.9, N39.0] 04/11/2019    Cystitis [N30.90] 04/11/2019     Plan:        Acute respiratory failure s/p ex-lap, open cholecystectomy, R colectomy w/ ileocolic anastomosis, extensive enterolysis, POD#5  - Pulm following - intubated on vent, more pain control, keep sedation PRN instead of gtt, continue attempts to wean  - CXR - stable congestion, small B/L pleural effusions L>R, unchanged  - Solumedrol 40mg qd, continuing to wean (from hydrocortisone over weekend)  - Lasix 20mg IV BID  - Surgery following  - ID Following - Merrem (day 10)    Severe Anemia 2/2 ABLA vs. AOCD  - Hgb 9.9 - concentrated (all lines up)  - Monitor for dark/black stools, FOBT+ on 5/19    New-onset

## 2019-05-20 NOTE — PROGRESS NOTES
General Surgery Progress Note            PATIENT NAME: Yoselin Ayala     TODAY'S DATE: 5/20/2019, 6:53 PM    SUBJECTIVE:    Pt  seen and examined. Extubated earlier today. OBJECTIVE:   VITALS:  /83   Pulse 95   Temp 98 °F (36.7 °C) (Oral)   Resp 18   Ht 5' (1.524 m)   Wt 105 lb 2.6 oz (47.7 kg)   SpO2 98%   BMI 20.54 kg/m²      INTAKE/OUTPUT:      Intake/Output Summary (Last 24 hours) at 5/20/2019 1853  Last data filed at 5/20/2019 1820  Gross per 24 hour   Intake 2576.21 ml   Output 4400 ml   Net -1823.79 ml                 CONSTITUTIONAL:  awake and alert. No acute distress  HEART:   Regular  LUNGS:   Diminished  ABDOMEN:   Abdomen soft, mildly tender, mildly distended. Hypoactive bowel sounds. Incision is intact.   Drain is serosanguineous  EXTREMITIES:   1+ peripheral edema    Data:  CBC with Differential:    Lab Results   Component Value Date    WBC 20.5 05/20/2019    RBC 3.54 05/20/2019    RBC 3.66 05/23/2012    HGB 11.0 05/20/2019    HCT 34.0 05/20/2019     05/20/2019     05/23/2012    MCV 87.4 05/20/2019    MCH 27.9 05/20/2019    MCHC 32.0 05/20/2019    RDW 15.8 05/20/2019    METASPCT 2 05/15/2019    LYMPHOPCT 8 05/20/2019    MONOPCT 4 05/20/2019    MYELOPCT 1 05/07/2019    BASOPCT 0 05/20/2019    MONOSABS 0.82 05/20/2019    LYMPHSABS 1.64 05/20/2019    EOSABS 0.21 05/20/2019    BASOSABS 0.00 05/20/2019    DIFFTYPE NOT REPORTED 05/20/2019     CMP:    Lab Results   Component Value Date     05/20/2019    K 5.0 05/20/2019     05/20/2019    CO2 33 05/20/2019    BUN 21 05/20/2019    CREATININE <0.40 05/20/2019    GFRAA CANNOT BE CALCULATED 05/20/2019    LABGLOM CANNOT BE CALCULATED 05/20/2019    GLUCOSE 90 05/20/2019    GLUCOSE 87 05/21/2012    PROT 5.2 05/18/2019    LABALBU 3.5 05/18/2019    LABALBU 4.6 05/17/2012    CALCIUM 8.1 05/20/2019    BILITOT 0.37 05/18/2019    ALKPHOS 62 05/18/2019    AST 13 05/18/2019    ALT 11 05/18/2019         ASSESSMENT Principal Problem:    Acute respiratory failure (HCC)  Active Problems:    Sepsis due to urinary tract infection (HCC)    Cystitis    Emphysematous cystitis    Septic shock (HCC)    Leukemoid reaction    Aspiration pneumonia of right lower lobe due to vomit (HCC)    MRSA carrier    Urinary retention    Abdominal distention    Elevated CEA    Elevated CA 19-9 level    Cholecystitis    CRP elevated    Elevated procalcitonin    Bandemia    Centrilobular emphysema (HCC)    Hemorrhage of rectum and anus    GI bleed    Anemia    Small bowel obstruction (HCC)    Anxiety disorder    Noncompliance  Resolved Problems:    * No resolved hospital problems. *  Status post right colectomy, cholecystectomy  Leukocytosis,? Etiology    Plan  1. Continue TPN  2. Continue antibiotics per infectious disease  3. We will check KUB in the morning. Hoping to start tube feeds tomorrow  4.  Up as tolerated        Gladys Kwong, DO [address]

## 2019-05-20 NOTE — PROGRESS NOTES
Infectious disease Consult Note      Patient: Mariela Worley  : 1950  Acct#:  [id number]     Date:  2019    Assessment:   Leukocytosis  . Aspiration pneumonia . Shock  . Cholecystitis status post cholecystectomy tube  grew Candida non-albicans and one colony of ESBL Klebsiella on culture . finished ABXS course   SBO S/p right colectomy with ileocolic anastomosis,open cholecystectomy and excision of small bowel diverticulum 5/15 . Single positive blood culture on 5/10 STAPHYLOCOCCUS SPECIES, COAGULASE NEGATIVE likely contamination     Ecoli Emphysematous cystitis initially treatedtreated     Aspiration pneumonia of right lower lobe initially     Yeast growth on sputum culture suspect contamination     MRSA carrier    Urinary retention     Anemia GI bleed followed by GI     PCN allergy      History of CVA with left hemiparesis              Recommendations:     Continue IV meropenem  . Follow blood cultures from  negative to date . Follow CBC and renal function  . Continue supportive care . Subjective:       History of Present Illness  Patient is a 77 y.o.  female admitted with Sepsis due to urinary tract infection   who is seen in consult for the same . She presented w dysuria and lower abd pain for around a week PRIOR TO ADMISSION associated with vomiting ,was found hypotensive with leukocytosis . CT suggested emphysematous cystitis,possible gastric outlet obstruction. CXR showed a right lower infiltrate. High CA-19-9,CEA  Allergy to [facility] w SOB   Urine culture  grew ESCHERICHIA COLI resistant to Ampicillin ,sensitive to all other tested ABXS . Blood cultures negative . Mycoplasma IGM 0.97   YEAST MODERATE GROWTH on sputum culture   S/P EGD   with reported esophagitis. GB US Findings suggesting acute cholecystitis. HIIDA showed absent gallbladder filling.    She was extubated on   Status post cholecystectomy tube  by interventional radiology, cultures on 4/22 grew Candida non-albicans and one colony of ESBL Klebsiella. PICC line was placed   Tagged RBC scan  was negative . CT abdomen 5/5 showed no fluid collection ,Bowel obstruction which is suspected to be caused by an internal hernia. NG tube was placed under fluoroscopy 5/9. She had a small bowel follow-through 5/9, developed respiratory failure with hypoxia, tachycardia and tachypnea was transferred to ICU placed on BiPAP, blood pressure on the low side required Levophed,WBC up to 28.5    code status was changed to [facility] on 5/13. S/p right colectomy with ileocolic anastomosis,open cholecystectomy and excision of small bowel diverticulum 5/15 . Interval history :  She is still intubated ,was converted to NSR   Reported dark stool FOBT+  She remains off Levophed, on Versed drip. No reported fever  On TPN   Past Medical History:   Diagnosis Date    Abnormal computed tomography of cervical spine     sclerotic bone appearance     CVA (cerebral vascular accident) ([address]     left  side weakness    GERD (gastroesophageal reflux disease)     Hypertension     Paraproteinemia     Weight loss       Past Surgical History:   Procedure Laterality Date    CHOLECYSTECTOMY N/A 5/15/2019    CHOLECYSTECTOMY performed by Raj Napoles DO at [facility]  4/14/2019         LAPAROSCOPY N/A 5/15/2019    LAPAROSCOPY EXPLORATORY CONVERTED TO EXPLORATORY LAPAROTOMY/ RIGHT COLON RESECTION AND ANASTAMOSIS/ OPEN CHOLECYSTECTOMY/ EXTENSIVE LYSIS OF ADHESIONS performed by Raj Napoles DO at [facility] 10  07/2011    Pacemaker is Medtronic Revo (compatible). Leads placed in 1995 are NOT MRI compatible. Placed at [facility]. V's per Dr. Saba Mcelroy can not have an MRI.     UPPER GASTROINTESTINAL ENDOSCOPY N/A 4/16/2019    EGD ESOPHAGOGASTRODUODENOSCOPY @ BEDSIDE  ICU 2002 performed by Quentin Muller MD at [address]          Admission Meds  No current facility-administered medications on file prior to encounter. Current Outpatient Medications on File Prior to Encounter   Medication Sig Dispense Refill    oxyCODONE-acetaminophen (PERCOCET) 5-325 MG per tablet Take 1 tablet by mouth every 6 hours as needed for Pain.  senna-docusate (PERICOLACE) 8.6-50 MG per tablet Take 1 tablet by mouth 2 times daily      budesonide-formoterol (SYMBICORT) 160-4.5 MCG/ACT AERO Inhale 2 puffs into the lungs 2 times daily      sucralfate (CARAFATE) 1 GM/10ML suspension Take 1 g by mouth 4 times daily       traZODone (DESYREL) 50 MG tablet Take 50 mg by mouth nightly      tiZANidine (ZANAFLEX) 2 MG tablet Take 2 mg by mouth every 8 hours as needed (left knee)       aspirin 81 MG tablet Take 81 mg by mouth daily      clopidogrel (PLAVIX) 75 MG tablet TAKE 1 TABLET DAILY 30 tablet 2    DOCQLACE 100 MG capsule TAKE 1 CAPSULE BY MOUTH IN THE MORNING & IN THE EVENING -DRINK PLENTYOF WATER WHILE TAKING THIS MEDICINE 30 capsule 1    amLODIPine (NORVASC) 10 MG tablet Take 10 mg by mouth daily.  LORazepam (ATIVAN) 0.5 MG tablet Take 0.5 mg by mouth every 6 hours as needed for Anxiety.  therapeutic multivitamin-minerals (THERAGRAN-M) tablet Take 1 tablet by mouth daily.  omeprazole (PRILOSEC) 20 MG capsule Take 20 mg by mouth daily.  venlafaxine (EFFEXOR) 37.5 MG tablet Take 37.5 mg by mouth 3 times daily.  Misc. Devices [facility]) [address] [facility] wheelchair with left fupper extremity support  Dx: stroke with left hemiparesis. 1 each 0           Allergies  Allergies   Allergen Reactions    Penicillins Shortness Of Breath and Rash    Amoxicillin         Social   Social History     Tobacco Use    Smoking status: Current Some Day Smoker     Packs/day: 1.00     Years: 30.00     Pack years: 30.00    Smokeless tobacco: Never Used   Substance Use Topics    Alcohol use: Not Currently     Alcohol/week: 16.8 oz     Types: 28 Glasses of wine per week                History reviewed.  No pertinent family history. Review of Systems  Unable to provide     Tolerating antibiotics. Physical Exam  BP (!) 136/56   Pulse 78   Temp 97.7 °F (36.5 °C) (Axillary)   Resp 21   Ht 5' (1.524 m)   Wt 105 lb 2.6 oz (47.7 kg)   SpO2 100%   BMI 20.54 kg/m²           General Appearance:intubated  . Skin: warm and dry, no rash or erythema  Head: normocephalic and atraumatic  Eyes: pupils equal, round  Neck: neck supple and non tender   Pulmonary/Chest: coarse to auscultation bilaterally- with  rhonchi  Cardiovascular: normal rate, regular rhythm, normal S1 and S2, no murmurs. Abdomen: soft,surgical INCISION INTACT ,NO ERYTHEMA  ,MORGAN drain serous   Extremities: no cyanosis, clubbing   Edema   PICC line in place       Data Review:    Recent Labs     05/18/19 0430 05/19/19 0619 05/20/19  0608   WBC 9.9 9.1 20.5*   HGB 9.0* 8.5* 9.9*   HCT 26.5* 24.9* 31.0*   MCV 84.8 84.2 87.4    214 394     Recent Labs     05/18/19 0430 05/19/19 0619 05/19/19  1407 05/19/19 2021 05/20/19  0608     --  145*  --   --  140   K 3.1*   < > 3.2* 3.1* 4.7 5.0     --  100  --   --  100   CO2 24  --  31  --   --  33*   PHOS 2.8  --  1.9* 2.4*  --  2.4*   BUN 8  --  14  --   --  21   CREATININE <0.40*  --  <0.40*  --   --  <0.40*    < > = values in this interval not displayed. Recent Labs     05/18/19 0430   AST 13   ALT 11   BILITOT 0.37   ALKPHOS 62     No results for input(s): LIPASE, AMYLASE in the last 72 hours.   Recent Labs     05/18/19 0430 05/19/19 0619 05/20/19  0608   PROTIME 16.2* 17.7* 16.6*   INR 1.3 1.5 1.3       Imaging Studies:                           All appropriate imaging studies and reports reviewed: Yes       Ct Abdomen Pelvis Wo Contrast Additional Contrast? Oral    Result Date: 4/14/2019  EXAMINATION: CT OF THE ABDOMEN AND PELVIS WITHOUT CONTRAST 4/14/2019 7:34 pm TECHNIQUE: CT of the abdomen and pelvis was performed without the administration of intravenous contrast. Multiplanar reformatted images are provided for review. Dose modulation, iterative reconstruction, and/or weight based adjustment of the mA/kV was utilized to reduce the radiation dose to as low as reasonably achievable. COMPARISON: 04/11/2019 HISTORY: ORDERING SYSTEM PROVIDED HISTORY: ABDOMINAL PAIN TECHNOLOGIST PROVIDED HISTORY: Water soluble contrast only please Ordering Physician Provided Reason for Exam: Abdominal pain - Vented patient. Contrast given via nurse through NG tube. Acuity: Unknown Type of Exam: Unknown Relevant Medical/Surgical History: Hx - Sepsis due to urinary tract infection. FINDINGS: Lower Chest: New moderate layering bilateral pleural effusions with bilateral lower lobe atelectasis. Organs: Limited evaluation due lack of intravenous contrast.  Cholelithiasis redemonstrated. No gallbladder wall thickening or biliary ductal dilatation. Scattered tiny hypodense lesions in the liver are too small to characterize but statistically represent benign cysts or hemangiomas and appear unchanged. The pancreas, spleen, adrenal glands, and kidneys are unremarkable. There is no hydronephrosis or urinary tract calculus. GI/Bowel: The stomach is distended. Enteric tube is in place. No contrast is seen distal to the pylorus and there is contrast reflux into the distal esophagus. There is no evidence of bowel obstruction. The appendix is not definitely visualized. No focal pericecal inflammatory changes are evident. Pelvis: The urinary bladder is decompressed by Tran catheter. No pelvic mass is seen. Peritoneum/Retroperitoneum: Small amount of free fluid in the pelvic cavity. No free air or focal fluid collection. No abnormal lymph node. Normal abdominal aortic caliber. Moderate calcific atherosclerosis. Bones/Soft Tissues: No acute osseous abnormality. Diffuse anasarca. Moderate degenerative changes in the lumbar spine. 1. Distended, contrast filled stomach with reflux of contrast into the esophagus.   No enteric contrast is seen distal to the pylorus suggesting delayed gastric emptying in the setting of ileus. 2. New moderate layering bilateral pleural effusions with bilateral lower lobe atelectasis. 3. Small amount of nonspecific free fluid in the pelvic cavity. 4. Anasarca. 5. Cholelithiasis. Xr Chest (single View Frontal)    Result Date: 4/13/2019  EXAMINATION: SINGLE XRAY VIEW OF THE CHEST 4/13/2019 7:18 am COMPARISON: April 12, 2019 HISTORY: ORDERING SYSTEM PROVIDED HISTORY: dyspnea TECHNOLOGIST PROVIDED HISTORY: dyspnea Ordering Physician Provided Reason for Exam: dyspnea Acuity: Acute Type of Exam: Subsequent/Follow-up Additional signs and symptoms: dyspnea FINDINGS: A right IJ catheter is seen with its tip terminating at the superior cavoatrial junction. The left chest wall pacemaker and leads are stable. The cardiomediastinal silhouette is stable. There is interval increased opacity at the right lung base, may be related to atelectasis versus pneumonia. There is small atelectasis and mild pleural effusion at the left lung base. There is no pneumothorax. There is no acute osseous abnormality. Interval increased opacity at the right lung base, may be related to atelectasis versus pneumonia. Small atelectasis and mild pleural effusion at the left lung, new since the prior study. Xr Femur Left (min 2 Views)    Result Date: 3/27/2019  EXAMINATION: 4 XRAY VIEWS OF THE LEFT FEMUR; 2 XRAY VIEWS OF THE LEFT KNEE; 3 XRAY VIEWS OF THE LEFT TIBIA AND FIBULA 3/27/2019 7:00 pm COMPARISON: Left hip 11/14/2015. HISTORY: ORDERING SYSTEM PROVIDED HISTORY: pain TECHNOLOGIST PROVIDED HISTORY: pain Ordering Physician Provided Reason for Exam: pt twisted wrong, lt knee pain, unable to straighten knee. Acuity: Acute Type of Exam: Initial FINDINGS: Left femur, four views: The bones are diffusely osteopenic. No acute fracture deformity. The hip joint is maintained.   The femoral head projects within the acetabulum. No focal soft tissue abnormality is seen. Left knee, two views: The knee is flexed. No acute osseous abnormality. No joint effusion. Joint spaces are not optimally profiled but no significant arthritic changes are apparent. Left tib-fib, three views: There is evidence of a remote healed distal tibia fracture. No acute fracture or dislocation is seen. No focal soft tissue abnormality. No acute osseous abnormality of the left femur. No acute osseous abnormality of the left knee. No acute osseous abnormality of the left tib-fib. Healed remote distal tibia fracture. Marked osteopenia, likely due to disuse. Xr Knee Left (1-2 Views)    Result Date: 3/27/2019  EXAMINATION: 4 XRAY VIEWS OF THE LEFT FEMUR; 2 XRAY VIEWS OF THE LEFT KNEE; 3 XRAY VIEWS OF THE LEFT TIBIA AND FIBULA 3/27/2019 7:00 pm COMPARISON: Left hip 11/14/2015. HISTORY: ORDERING SYSTEM PROVIDED HISTORY: pain TECHNOLOGIST PROVIDED HISTORY: pain Ordering Physician Provided Reason for Exam: pt twisted wrong, lt knee pain, unable to straighten knee. Acuity: Acute Type of Exam: Initial FINDINGS: Left femur, four views: The bones are diffusely osteopenic. No acute fracture deformity. The hip joint is maintained. The femoral head projects within the acetabulum. No focal soft tissue abnormality is seen. Left knee, two views: The knee is flexed. No acute osseous abnormality. No joint effusion. Joint spaces are not optimally profiled but no significant arthritic changes are apparent. Left tib-fib, three views: There is evidence of a remote healed distal tibia fracture. No acute fracture or dislocation is seen. No focal soft tissue abnormality. No acute osseous abnormality of the left femur. No acute osseous abnormality of the left knee. No acute osseous abnormality of the left tib-fib. Healed remote distal tibia fracture. Marked osteopenia, likely due to disuse.      Xr Knee Left (3 Views)    Result Date: 3/30/2019  EXAMINATION: 3 XRAY VIEWS OF THE LEFT KNEE 3/30/2019 10:58 am COMPARISON: March 27, 2018 HISTORY: ORDERING SYSTEM PROVIDED HISTORY: F/u L knee pain after cast TECHNOLOGIST PROVIDED HISTORY: AP, oblique, lateral F/u L knee pain after cast FINDINGS: Marked osteopenia. Interval casting, which degrades fine osseous detail question cortical offset in the lateral femoral metaphysis. No malalignment identified. No significant joint effusion. Interval casting. Question nondisplaced fracture in the lateral femoral metaphysis. Osteopenia. Xr Tibia Fibula Left (2 Views)    Result Date: 3/27/2019  EXAMINATION: 4 XRAY VIEWS OF THE LEFT FEMUR; 2 XRAY VIEWS OF THE LEFT KNEE; 3 XRAY VIEWS OF THE LEFT TIBIA AND FIBULA 3/27/2019 7:00 pm COMPARISON: Left hip 11/14/2015. HISTORY: ORDERING SYSTEM PROVIDED HISTORY: pain TECHNOLOGIST PROVIDED HISTORY: pain Ordering Physician Provided Reason for Exam: pt twisted wrong, lt knee pain, unable to straighten knee. Acuity: Acute Type of Exam: Initial FINDINGS: Left femur, four views: The bones are diffusely osteopenic. No acute fracture deformity. The hip joint is maintained. The femoral head projects within the acetabulum. No focal soft tissue abnormality is seen. Left knee, two views: The knee is flexed. No acute osseous abnormality. No joint effusion. Joint spaces are not optimally profiled but no significant arthritic changes are apparent. Left tib-fib, three views: There is evidence of a remote healed distal tibia fracture. No acute fracture or dislocation is seen. No focal soft tissue abnormality. No acute osseous abnormality of the left femur. No acute osseous abnormality of the left knee. No acute osseous abnormality of the left tib-fib. Healed remote distal tibia fracture. Marked osteopenia, likely due to disuse.      Xr Abdomen (kub) (single Ap View)    Result Date: 4/14/2019  EXAMINATION: SINGLE SUPINE XRAY VIEW(S) OF THE ABDOMEN 4/14/2019 7:39 am COMPARISON: CT abdomen and pelvis  film from 11 April 2019 HISTORY: [address] HISTORY: Abd Distention TECHNOLOGIST PROVIDED HISTORY: Abd Distention Ordering Physician Provided Reason for Exam: Abdominal distention. Pt was moving around in the bed. Best films at present time. Acuity: Acute Type of Exam: Initial Additional signs and symptoms: Abdominal distention. Pt was moving around in the bed. Best films at present time. FINDINGS: Portable view time stamped at 748 hours demonstrates an intestinal tube terminating in the midportion of a gaseous Spike dilated stomach. Densities are present over the stomach likely medication. Bipolar pacemaker is in situ with intact leads. Heart size is top-normal, stable. Gaseous distension of the stomach and loop of bowel in the upper mid abdomen is noted but there is gas and fecal material in the rectum. Gastric outlet obstruction or proximal small bowel partial obstruction is suspected. No free air is noted. Midline city is present over the pelvis likely a monitor or CT small bore catheter. Vascular calcification is present in the pelvis. Persistent preferential gaseous distension of the stomach although there is some gas in the upper abdomen and gas and fecal material present in the rectosigmoid. Findings suggest gastric outlet obstruction. Ct Abdomen Pelvis W Iv Contrast    Result Date: 4/11/2019  EXAMINATION: CT OF THE ABDOMEN AND PELVIS WITH CONTRAST 4/11/2019 5:14 pm TECHNIQUE: CT of the abdomen and pelvis was performed with the administration of intravenous contrast. Multiplanar reformatted images are provided for review. Dose modulation, iterative reconstruction, and/or weight based adjustment of the mA/kV was utilized to reduce the radiation dose to as low as reasonably achievable. COMPARISON: None.  HISTORY: ORDERING SYSTEM PROVIDED HISTORY: Abdominal pain TECHNOLOGIST PROVIDED HISTORY: IV Only Contrast Ordering Physician Provided Reason for Exam: patient c/o abd pain for an hour FINDINGS: Lower Chest: Trace pleural fluid bilaterally is noted. Indwelling cardiac pacemaker is present. Heart size is normal.  Coronary artery calcifications are evident. The esophagus is significantly dilated and fluid-filled. No paraesophageal adenopathy is evident. Organs: The spleen, pancreas, and adrenals are unremarkable. The liver contains hypodensities, likely cysts. The gallbladder is distended and contains a solitary gallstone. The kidneys excrete contrast bilaterally. Extrarenal collecting systems are noted. The ureters are mildly dilated down to the urinary bladder without evidence of intraluminal or ureteral obstructing calculi. GI/Bowel: Marked distention of the stomach is noted; this continues to the pylorus with appearance suggesting partial gastric outlet obstruction. Fluid is present in some small bowel loops and colon distal to the stomach. No small bowel obstruction. Pelvis: Marked distention of the urinary bladder is noted. 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 Distended ureters may be related to reflux or infection. No free pelvic fluid, pelvic or inguinal adenopathy is noted. Peritoneum/Retroperitoneum: No aortic aneurysm. Mild to moderate plaque is noted in the infrarenal abdominal aorta and both proximal iliac arteries. Shotty lymph nodes are present around the aorta in the upper abdomen. No mesenteric adenopathy is noted. Bones/Soft Tissues: Degenerative changes are present in the hips and lower lumbar facets. Estimated biologic radiation dose for this procedure:258.77 mGy/cm2.     1. Dilatation of the thoracic esophagus filled with fluid. No obstructive process is noted. No adjacent enlarged lymph nodes. 2. Trace pleural fluid. 3. Marked distention of the stomach. This appears to extend to the pylorus. Partial gastric outlet obstruction is not excluded.  4. Bilateral dilated ureters without obstructing calculi. Urinary bladder is markedly distended with bladder wall air suggesting emphysematous cystitis. Dilatation of the ureters may be related to reflux or infection. 5. Atherosclerotic disease. 6. Other findings as above. Critical results were called by Dr. Sally Salvador MD to [address] on 4/11/2019 at 17:37. Xr Chest Portable    Result Date: 4/16/2019  EXAMINATION: SINGLE XRAY VIEW OF THE CHEST 4/16/2019 6:54 am COMPARISON: 04/15/2019, 610 hours HISTORY: ORDERING SYSTEM PROVIDED HISTORY: ETT placement TECHNOLOGIST PROVIDED HISTORY: ETT placement Ordering Physician Provided Reason for Exam: on vent Acuity: Acute Type of Exam: Initial 49-year-old female on ventilator; check endotracheal tube placement FINDINGS: Portable AP upright view of the chest. Endotracheal tube distal tip overlying the mid trachea approximately 4.1 cm above the level of the ron. Enteric tube traverses the GE junction with distal tip excluded from the field of view. Left subclavian approach cardiac pacemaker device distal lead tips relatively stable in position. Right internal jugular approach central venous catheter distal tip overlying the high right atrium, stable. Cardiac monitor leads overlie the chest. Atherosclerotic calcification of the thoracic aorta. Slight stable volume loss of the left hemithorax. No pneumothorax. No free air. Dense retrocardiac/left basilar airspace consolidation and small left-sided pleural effusion. Stable mild focal opacity at the right mid lung zone. Underlying COPD. Stable mild pulmonary vascular congestion and left-sided predominant parahilar opacity. Visualized osseous structures remain unchanged. 1. Stable multifocal airspace disease as detailed above with dense retrocardiac/left basilar airspace consolidation and small left-sided pleural effusion. Mild pulmonary vascular congestion. Findings may represent edema or multifocal pneumonia. 2. Underlying COPD.  3. Tubes and line as detailed above. Xr Chest Portable    Result Date: 4/15/2019  EXAMINATION: SINGLE XRAY VIEW OF THE CHEST 4/15/2019 6:47 am COMPARISON: 14 April 2019 HISTORY: ORDERING SYSTEM PROVIDED HISTORY: ETT placement TECHNOLOGIST PROVIDED HISTORY: ETT placement Ordering Physician Provided Reason for Exam: on vent Acuity: Acute Type of Exam: Initial FINDINGS: AP portable view of the chest time stamped at 612 hours demonstrates overlying cardiac monitoring electrodes. Endotracheal tube terminates 4 cm above the ron. Bipolar pacemaker enters from the left with intact leads in appropriate positions. Intestinal tube extends beyond the fundus of the stomach, tip not included. Right internal jugular catheter terminates at the cavoatrial junction. Heart size is normal.  Aortic arch is calcified. There is interval improvement in vascular congestion with resolution of perihilar opacities. Some bibasilar opacities remain. No extrapleural air is noted. Osseous structures are stable. Interval improvement in vascular congestion and bilateral opacities consistent with resolving pulmonary edema. Tubes and lines as above. Xr Chest Portable    Result Date: 4/14/2019  EXAMINATION: SINGLE XRAY VIEW OF THE CHEST 4/14/2019 7:57 am COMPARISON: Portable chest 04/13/2019. HISTORY: ORDERING SYSTEM PROVIDED HISTORY: Intubation TECHNOLOGIST PROVIDED HISTORY: Intubation Ordering Physician Provided Reason for Exam: intubation Acuity: Acute Type of Exam: Initial Additional signs and symptoms: intubation FINDINGS: Endotracheal tube terminates over the midthoracic trachea. Dual-chamber pacemaker leads appear unchanged in position. Right IJ approach central venous catheter unchanged in position. Heart size not substantially changed. Perihilar and basilar opacities further increased. Left pleural effusion increased in size.      Findings may reflect pulmonary edema, progressed from yesterday's exam.  Left pleural effusion increased in size.     Xr Chest Portable    Result Date: 4/12/2019  EXAMINATION: SINGLE XRAY VIEW OF THE CHEST 4/12/2019 5:26 am COMPARISON: November 14, 2015. HISTORY: ORDERING SYSTEM PROVIDED HISTORY: line placement TECHNOLOGIST PROVIDED HISTORY: line placement Ordering Physician Provided Reason for Exam: New right side line placement. Acuity: Acute Type of Exam: Initial Additional signs and symptoms: New right side line placement. FINDINGS: Stable left pectoral trans venous cardiac pacer device. New right IJ central venous catheter with tip near the superior atrial caval junction. Normal lung volume. No new consolidation. Curvilinear radiopacity projecting over the right upper lobe likely represents artifact from a skin fold. No pleural effusion or pneumothorax. Stable cardiomediastinal silhouette and great vessels with redemonstration of atherosclerotic thoracic aorta. New right IJ central venous catheter with tip near the superior atrial caval junction. No pneumothorax. No new consolidation. Thank you for allowing me to participate in the care of your patient. Please feel free to contact me with any questions or concerns.      Stu Barton MD

## 2019-05-20 NOTE — CARE COORDINATION
DISCHARGE PLANNING NOTE:    Patient extubated today. Remains on IV merrem, Amio gtt, lasix BID, TPN and Lipids. LSW is following and [facility] was denied by insurance last week. Unsure if patient will meet criteria for [facility] now that she has been extubated. Will continue to follow along with LSW. [facility] is following along as well.      Electronically signed by Drew Tucker RN on 5/20/2019 at 4:11 PM

## 2019-05-20 NOTE — PROGRESS NOTES
Signs  Patient Currently in Pain: Yes  Pain Assessment: Faces  Hammer-Baker Pain Rating: Hurts whole lot  Pain Type: Acute pain  Pain Location: Leg;Arm  Pain Orientation: Left  Non-Pharmaceutical Pain Intervention(s): Ambulation/Increased Activity; Distraction;Repositioned  Response to Pain Intervention: None                Bed Mobility:   Bed Mobility  Rolling: Rolling Left;Dependent/Total  Scooting: Dependent/Total;2 Person assistance(to HOB)  Bed mobility  Scooting: Dependent/Total;2 Person assistance(to HOB)    Transfers:  Sit to Stand: Unable to assess                             Stairs/Curb  Stairs?: No                                                           Other exercises 1: Supine bilat UE PROM Right x15, Left x5-8 (pt not tolerating left UE well)  Other exercises 2: Supine bilat LE therex, Right LE x15 PROM, Left LE x5 PROM pt resisting and in pain (RN Brina Fried contacted who reports waiting for new pain med to give pt)  Other exercises 3: Repositioning with pillow under right Side and bilat UE to help with the edema           Activity Tolerance: Patient limited by pain; Patient limited by endurance; Patient limited by fatigue;Treatment limited secondary to medical complications (free text)(pt weaning trial will begin shortly)  Activity Tolerance: Pt in high pain with movement of left knee, pt resistance to ROM. PT Equipment Recommendations  Equipment Needed: No       Assessment  Activity Tolerance: Patient limited by pain; Patient limited by endurance; Patient limited by fatigue;Treatment limited secondary to medical complications (free text)(pt weaning trial will begin shortly)   Body structures, Functions, Activity limitations: Decreased functional mobility ; Decreased ADL status; Decreased strength; Increased Pain  Prognosis: Good  Discharge Recommendations: ECF with PT     Type of devices: Left in bed;Nurse notified  Restraints  Initially in place: Yes  Restraints: B wrist restraint     Plan  Times per week: 5-7x/wk  Times per day: Daily  Current Treatment Recommendations: ROM, Strengthening, Functional Mobility Training, Transfer Training    Patient Education  New Education Provided: POC and importance of mobility  Learner:patient  Method: demonstration and explanation       Outcome: needs reinforcement     Goals  Short term goals  Time Frame for Short term goals: 10 visits  Short term goal 1: improve strength RUE/RLE to 4/5 to assist in bed mobility and transfers  Short term goal 2: improve bed mobility to minx1  Short term goal 3: improve transfers to minx1  Short term goal 4: progress to Gait and/or use of wheelchair for mobility    PT Individual Minutes  Time In: 0810  Time Out: 0835  Minutes: 25    Electronically signed by Kaylah Swanson PTA on 5/20/19 at 9:38 AM

## 2019-05-20 NOTE — PLAN OF CARE
Problem: Risk for Impaired Skin Integrity  Goal: Tissue integrity - skin and mucous membranes  Description  Structural intactness and normal physiological function of skin and  mucous membranes. 5/20/2019 1540 by Gunjan Castro RN  Outcome: Ongoing  Note:   Patient turned and repositioned every two hours. Oral care provided every two hours. 5/20/2019 0539 by Sharona Smith RN  Outcome: Ongoing  Note:   Patient turned and repositioned every 2 hours and as needed for comfort. Skin remains dry and intact. No new skin breakdown noted. Problem: Falls - Risk of:  Goal: Will remain free from falls  Description  Will remain free from falls  5/20/2019 1540 by Gunjan Castro RN  Outcome: Ongoing  5/20/2019 0539 by Sharona Smith RN  Outcome: Ongoing  Goal: Absence of physical injury  Description  Absence of physical injury  Outcome: Ongoing     Problem: Infection, Septic Shock:  Goal: Will show no infection signs and symptoms  Description  Will show no infection signs and symptoms  5/20/2019 1540 by Gunjan Castro RN  Outcome: Ongoing  5/20/2019 0539 by Sharona Smith RN  Outcome: Ongoing     Problem: Tissue Perfusion, Altered:  Goal: Circulatory function within specified parameters  Description  Circulatory function within specified parameters  5/20/2019 1540 by Gunjan Castro RN  Outcome: Ongoing  5/20/2019 0539 by Sharona Smith RN  Outcome: Ongoing     Problem: Pain:  Goal: Pain level will decrease  Description  Pain level will decrease  5/20/2019 1540 by Gunjan Castro RN  Outcome: Ongoing  5/20/2019 0539 by Sharona Smith RN  Outcome: Ongoing  Note:   Pain assessed at regular intervals. Medications administered as requested for comfort. Pain remains at a tolerable level.    Goal: Control of acute pain  Description  Control of acute pain  Outcome: Ongoing  Goal: Control of chronic pain  Description  Control of chronic pain  Outcome: Ongoing     Problem: Nutrition  Goal: Optimal nutrition therapy  Description       5/20/2019 1540 by Marcia Orozco RN  Outcome: Ongoing  5/20/2019 0539 by Brina Marquis RN  Outcome: Ongoing     Problem: Restraint Use - Nonviolent/Non-Self-Destructive Behavior:  Goal: Absence of restraint-related injury  Description  Absence of restraint-related injury    5/20/2019 1540 by Marcia Orozco RN  Outcome: Ongoing  Note:   Restraints released every two hours. Skin integrity assessed and maintained. 5/20/2019 0539 by Brina Marquis RN  Outcome: Ongoing  Note:   Restraints released and ROM performed every 2 hours and PRN. No injury noted. Problem: Restraint Use - Nonviolent/Non-Self-Destructive Behavior:  Goal: Absence of restraint indications  Description  Absence of restraint indications    5/20/2019 1540 by Marcia Orozco RN  Outcome: Not Met This Shift  Note:   Patient continues to pull at lines and tubes while restraints are released. 5/20/2019 0539 by Brina Marquis RN  Outcome: Not Met This Shift  Note:   Patient attempts to pull at lines/tubes when restraints are released. Restraints remain in place for patient safety.

## 2019-05-20 NOTE — PROGRESS NOTES
ICU Progress Note (Vent)   [facility]    Patient - Mariela Worley,  Age - 77 y.o.    - 1950      Room Number -    MRN -  [id number]   Acct # - [de-identified]  Date of Admission -  2019  2:48 PM    Events of Past 24 Hours   Alert even on Versed drip; events of yesterday noted    Vitals    height is 5' (1.524 m) and weight is 105 lb 2.6 oz (47.7 kg). Her axillary temperature is 97.7 °F (36.5 °C). Her blood pressure is 136/56 (abnormal) and her pulse is 78. Her respiration is 21 and oxygen saturation is 100%. Temperature Range: Temp: 97.7 °F (36.5 °C) Temp  Av.1 °F (36.7 °C)  Min: 97.7 °F (36.5 °C)  Max: 98.5 °F (36.9 °C)  BP Range:  Systolic (52ZRA), UAA:398 , Min:85 , UHS:699     Diastolic (24UZK), OSBALDO:53, Min:36, Max:84    Pulse Range: Pulse  Av.3  Min: 64  Max: 137  Respiration Range: Resp  Av.8  Min: 14  Max: 21  Current Pulse Ox[de-identified]  SpO2: 100 %  24HR Pulse Ox Range:  SpO2  Av.4 %  Min: 93 %  Max: 100 %  Oxygen Amount and Delivery: O2 Flow Rate (L/min): 2 L/min      Wt Readings from Last 3 Encounters:   19 105 lb 2.6 oz (47.7 kg)   19 89 lb (40.4 kg)   19 94 lb 1.6 oz (42.7 kg)     I/O       Intake/Output Summary (Last 24 hours) at 2019 0739  Last data filed at 2019 0611  Gross per 24 hour   Intake 4081.65 ml   Output 3300 ml   Net 781.65 ml     I/O last 3 completed shifts:   In: 4081.7 [I.V.:2266.8; NG/GT:55; IV Piggyback:100]  Out: 3300 [Urine:2250; Drains:1050]     DRAIN/TUBE OUTPUT:     Invasive Lines   ETT Day -   6  Lines -  PICC line was placed on     ICP PRESSURE RANGE:  No data recorded  CVP PRESSURE RANGE:  No data recorded  Mechanical Ventilation Data   SETTINGS (Comprehensive)  Vent Information  $Ventilation: $Subsequent Day  Ventilator Started: Yes  Ventilation Day(s): 6  Skin Assessment: Clean, dry, & intact  Equipment ID: [id number]  Equipment Changed: HME  Vent Type: Servo i  Vent Mode: PRVC  Vt Ordered: 450 mL  Rate Set: 12 bmp  Pressure Support: 7 cmH20  FiO2 : 28 %  Sensitivity: 5  PEEP/CPAP: 5  I Time/ I Time %: 0.9 s  Cuff Pressure (cm H2O): 23 cm H2O  Humidification Source: HME  Additional Respiratory  Assessments  Pulse: 78  Resp: 21  SpO2: 100 %  End Tidal CO2: 23 (%)  Position: Semi-Diaz's  Humidification Source: HME  Oral Care Completed?: Yes  Oral Care: Mouth suctioned  Subglottic Suction Done?: Yes  Cuff Pressure (cm H2O): 23 cm H2O       ABGs:   Lab Results   Component Value Date    PHART 7.487 05/20/2019    PO2ART 62.2 05/20/2019    BFJ8BKP 48.9 05/20/2019       Lab Results   Component Value Date    MODE [facility] 05/20/2019         Medications   IV   heparin (porcine) 16 Units/kg/hr (05/20/19 0334)    amiodarone 0.5 mg/min (05/20/19 0333)    PN-Adult 2-in-1 Central Line (Standard) 65 mL/hr at 05/19/19 1716    dextrose      midazolam 5 mg/hr (05/20/19 0335)    lactated ringers 10 mL/hr at 05/17/19 1917    norepinephrine Stopped (05/18/19 1117)      methylPREDNISolone  40 mg Intravenous Daily    metoprolol  5 mg Intravenous Q4H    furosemide  20 mg Intravenous BID    fat emulsion  100 mL Intravenous Daily    insulin lispro  0-6 Units Subcutaneous Q6H    sodium chloride flush  10 mL Intravenous 2 times per day    pantoprazole  40 mg Intravenous Daily    And    sodium chloride (PF)  10 mL Intravenous Daily    ipratropium-albuterol  1 ampule Inhalation Q4H WA    meperidine  25 mg Intravenous Once    alteplase  1 mg Intracatheter Once    meropenem  1 g Intravenous Q8H    mometasone-formoterol  2 puff Inhalation BID       Diet/Nutrition   DIET TUBE FEED CONTINUOUS/CYCLIC NPO; Semi-elemental; Nasogastric; Continuous; 10; 10; Exceptions are: Sips with Meds  PN-Adult 2-in-1 Central Line (Standard)    Exam   VITALS    height is 5' (1.524 m) and weight is 105 lb 2.6 oz (47.7 kg). Her axillary temperature is 97.7 °F (36.5 °C).  Her blood pressure is 136/56 (abnormal) 1.3 1.5 1.3       APTT  Recent Labs     05/18/19  0430 05/19/19  0619 05/20/19  0608   APTT 31.3 31.1 75.3*       Lactic Acid  Lab Results   Component Value Date    LACTA 1.9 05/16/2019    LACTA 1.8 05/12/2019    LACTA 0.9 05/11/2019        BNP   No results for input(s): BNP in the last 72 hours. Cultures       Radiology     Plain Films         Chest x-ray shows endotracheal tube in good position, left lower lobe infiltrate/consolidation which has been present    SYSTEM ASSESSMENT    Acute hypoxic respiratory failure  Status post laparotomy with right colectomy and open cystectomy  A. fib with RVR new onset  History of treated aspiration pneumonia  Severe COPD      Neuro   Minimize sedation, use fentanyl to control pain which will also help with sedation  Use fentanyl and Ativan as needed rather than continuous drips. Respiratory   Wean oxygen as tolerated.  Keep O2 sat > 88%  Wean from ventilator as tolerated, will do daily weaning trials  Decrease tidal volumes and she is overbreathing the vent and still has metabolic alkalosis  Also increase PEEP to prevent atelectasis       Hemodynamics   On amiodarone and heparin drip for atrial fibrillation    Gastrointestinal/Nutrition   On TPN    Renal   Decrease potassium in TPN as well as acetate      Infectious Disease   Remains on Merrem    Hematology/Oncology   Monitor hemoglobin and hematocrit now that on heparin drip    Endocrine   Blood sugars well controlled    Social/Spiritual/DNR/Disposition/Other     Discussed with housestaff no family here    Critical Care Time   >30  min    Electronically signed by Ammy Huggins MD on 5/20/2019 at 7:39 AM

## 2019-05-20 NOTE — PROGRESS NOTES
results for input(s): CHOL, HDL in the last 72 hours. Invalid input(s): LDLCALCU  INR:   Recent Labs     05/18/19  0430 05/19/19  0619 05/20/19  0608   INR 1.3 1.5 1.3       Objective:   Vitals: /63   Pulse 81   Temp 97.8 °F (36.6 °C) (Oral)   Resp 20   Ht 5' (1.524 m)   Wt 105 lb 2.6 oz (47.7 kg)   SpO2 98%   BMI 20.54 kg/m²     General appearance: awake, on mechanical ventilation   HEENT: Head: Normocephalic, no lesions, without obvious abnormality  Neck: no JVD  Lungs: clear to auscultation   Heart: regular rate and rhythm, S1, S2 normal  Abdomen: soft, non-tender; bowel sounds normal  Extremities: No LE edema    Assessment:   1. Atrial Fibrillation   2. Mild LV dysfunction  3. Anemia, FOBT positive  4. Cholecystis and SBO s/p ex-lap   5. Subclinical hypothyroidism     Patient Active Problem List:     Paraproteinemia     CVA (cerebral vascular accident) ([address]     Abnormal computed tomography of cervical spine     Weight loss     Functional gait abnormality     Left knee pain     Knee pain, left     Acute pain of left knee     Sepsis due to urinary tract infection (HCC)     Cystitis     Emphysematous cystitis     Septic shock (HCC)     Leukemoid reaction     Aspiration pneumonia of right lower lobe due to vomit (HCC)     MRSA carrier     Urinary retention     Abdominal distention     Elevated CEA     Elevated CA 19-9 level     Cholecystitis     CRP elevated     Elevated procalcitonin     Bandemia     Centrilobular emphysema (HCC)     Hemorrhage of rectum and anus     GI bleed     Anemia     Small bowel obstruction (HCC)     Anxiety disorder     Noncompliance     Acute respiratory failure ([address]        Treatment Plan:   1. Continue IV amiodarone   2. Treatment of subclinical hypothyroidism per primary  3. On IV heparin   4. PRN Lopressor       Discussed with patient and nursing.        Jhony Painter MD  [id number] [facility]   [facility] Attending Physician Statement  I have discussed the care of the patient, including pertinent history and exam findings, with the resident. I have seen and examined the patient and the key elements of all parts of the encounter have been performed by me. I agree with the assessment, plan and orders as documented by the resident.   Pt in NSR , no AC due to bleed  Jamey Das MD

## 2019-05-20 NOTE — PLAN OF CARE
Problem: Risk for Impaired Skin Integrity  Goal: Tissue integrity - skin and mucous membranes  Description  Structural intactness and normal physiological function of skin and  mucous membranes. 5/20/2019 0539 by Radha Peterson RN  Outcome: Ongoing  Note:   Patient turned and repositioned every 2 hours and as needed for comfort. Skin remains dry and intact. No new skin breakdown noted. 5/19/2019 1650 by Sanju Leon RN  Outcome: Ongoing  Intervention: SKIN ASSESSMENT  5/19/2019 1650 by Sanju Leon RN  Note:   No new skin breakdown noted this shift, patient turned every 2 hours      Problem: Falls - Risk of:  Goal: Will remain free from falls  Description  Will remain free from falls  5/20/2019 0539 by Radha Peterson RN  Outcome: Ongoing  5/19/2019 1650 by Sanju Leon RN  Outcome: Ongoing  Note:   Patient remains free from falls this shift, appropriate safety measures in place   Goal: Absence of physical injury  Description  Absence of physical injury  5/19/2019 1650 by Sanju Leon RN  Outcome: Ongoing     Problem: Infection, Septic Shock:  Goal: Will show no infection signs and symptoms  Description  Will show no infection signs and symptoms  5/20/2019 0539 by Radha Peterson RN  Outcome: Ongoing  5/19/2019 1650 by Sanju Leon RN  Outcome: Ongoing     Problem: Tissue Perfusion, Altered:  Goal: Circulatory function within specified parameters  Description  Circulatory function within specified parameters  5/20/2019 0539 by Radha Peterson RN  Outcome: Ongoing  5/19/2019 1650 by Sanju Leon RN  Outcome: Ongoing     Problem: Pain:  Goal: Pain level will decrease  Description  Pain level will decrease  5/20/2019 0539 by Radha Peterson RN  Outcome: Ongoing  Note:   Pain assessed at regular intervals. Medications administered as requested for comfort. Pain remains at a tolerable level.    5/19/2019 1650 by Sanju Leon RN  Outcome: Ongoing  Note:   Patient medicated for pain this shift   Goal: Control of acute pain  Description  Control of acute pain  5/19/2019 1650 by Aston Beebe RN  Outcome: Ongoing  Goal: Control of chronic pain  Description  Control of chronic pain  5/19/2019 1650 by Aston Beebe RN  Outcome: Ongoing     Problem: Nutrition  Goal: Optimal nutrition therapy  Description       5/20/2019 0539 by Jayant More RN  Outcome: Ongoing  5/19/2019 1650 by Aston Beebe RN  Outcome: Ongoing     Problem: Restraint Use - Nonviolent/Non-Self-Destructive Behavior:  Goal: Absence of restraint-related injury  Description  Absence of restraint-related injury    5/20/2019 0539 by Jayant More RN  Outcome: Ongoing  Note:   Restraints released and ROM performed every 2 hours and PRN. No injury noted. 5/19/2019 1650 by Aston Beebe RN  Outcome: Ongoing     Problem: Restraint Use - Nonviolent/Non-Self-Destructive Behavior:  Goal: Absence of restraint indications  Description  Absence of restraint indications    5/20/2019 0539 by Jayant More RN  Outcome: Not Met This Shift  Note:   Patient attempts to pull at lines/tubes when restraints are released. Restraints remain in place for patient safety. 5/19/2019 1650 by Aston Beebe RN  Outcome: Ongoing  Note:   Patient attempts to pull at lines and tubes when unrestrained.  Restraints continued

## 2019-05-20 NOTE — PROGRESS NOTES
Nutrition Assessment (Parenteral Nutrition)    Type and Reason for Visit: Reassess    Nutrition Recommendations: Following changes made in additives: K acetate- 40 to 0 mEq, K acetate- 40 to 0 mEq, Mg- 17 to 15 mEq, insulin- 8 to 6 units, add MVI & trace elements. Nutrition Assessment: Pt stable from a nutritional standpoint as tube feeding held for extubation with TPN & lipids to continue. Malnutrition Assessment:  · Malnutrition Status: At risk for malnutrition  · Context: Acute illness or injury  · Findings of the 6 clinical characteristics of malnutrition (Minimum of 2 out of 6 clinical characteristics is required to make the diagnosis of moderate or severe Protein Calorie Malnutrition based on AND/ASPEN Guidelines):  1. Energy Intake-Less than or equal to 50% of estimated energy requirement, (since 5-13)    2. Weight Loss-Unable to assess(edema present),    3. Fat Loss-Unable to assess(Pt is thin; edema present; no known recent loss of muscle or fat),    4. Muscle Loss-Unable to assess,    5. Fluid Accumulation-Moderate to severe fluid accumulation, Extremities  6.  Strength-Not measured    Nutrition Risk Level: High    Nutrient Needs:  · Estimated Daily Total Kcal: 4253-6193 based on 35-39 kcal per kg of usual body wt  · Estimated Daily Protein (g): 63-68 based on 1.4-1.5 gm/kg IBW(revised)    Nutrition Diagnosis:   · Problem: Inadequate oral intake  · Etiology: related to Alteration in GI function, Insufficient energy/nutrient consumption     Signs and symptoms:  as evidenced by NPO status due to medical condition, Nutrition support - PN    Objective Information:  · Nutrition-Focused Physical Findings: Hypotactive bowel sounds.  Edema: +1 pitting Generalized, +3 pitting RUE, +4 pitting LUE  · Wound Type: Deep Tissue Injury, Multiple, Stage II, Pressure Ulcer(Wound under cast)  · Current Nutrition Therapies:  · Oral Diet Orders: NPO   · Oral Diet intake: NPO  · Oral Nutrition Supplement (ONS) Orders: None  · ONS intake: NPO  · Parenteral Nutrition Orders:  · Type and Formula: Premix Central, 2-in-1 Custom   · Lipids: 100ml  · Rate/Volume: 65/1560 ml daily  · Duration: Continuous  · Current PN Order Provides: 1573 kcal, 78 gm pro  · Goal PN Orders Provides: Total nutrition support goal: 0112-9058 kcal, 63-68 gm protein  · Additional Calories: None  · Anthropometric Measures:  · Ht: 5' (152.4 cm)   · Current Body Wt: 105 lb 2.6 oz (47.7 kg)(Edema, Positive Fluid Balance)  · Admission Body Wt: 102 lb (46.3 kg)  · Usual Body Wt: 89 lb 1.1 oz (40.4 kg)(3-30-19)  · Ideal Body Wt: 100 lb (45.4 kg), % Ideal Body 89% (based on wt 5-12)  · BMI Classification: BMI 18.5 - 24.9 Normal Weight    Nutrition Interventions:   Continue NPO, Modify current Parenteral Nutrition  Continued Inpatient Monitoring    Nutrition Evaluation:   · Evaluation: Progressing toward goals   · Goals: Adequate nutritional intake or provision    · Monitoring: Nutrition Progression, PN Intake, PN Tolerance, Wound Healing, I&O, Monitor Hemodynamic Status, Monitor Bowel Function, Weight, Pertinent ALL [employer] R.D.   Clinical Dietitian  Office: [phone]

## 2019-05-21 ENCOUNTER — APPOINTMENT (OUTPATIENT)
Dept: GENERAL RADIOLOGY | Age: 69
DRG: 853 | End: 2019-05-21
Payer: COMMERCIAL

## 2019-05-21 LAB
ABSOLUTE EOS #: 0 K/UL (ref 0–0.4)
ABSOLUTE IMMATURE GRANULOCYTE: ABNORMAL K/UL (ref 0–0.3)
ABSOLUTE LYMPH #: 1.04 K/UL (ref 1–4.8)
ABSOLUTE MONO #: 1.24 K/UL (ref 0.1–1.3)
ANION GAP SERPL CALCULATED.3IONS-SCNC: 9 MMOL/L (ref 9–17)
BASOPHILS # BLD: 0 % (ref 0–2)
BASOPHILS ABSOLUTE: 0 K/UL (ref 0–0.2)
BUN BLDV-MCNC: 27 MG/DL (ref 8–23)
BUN/CREAT BLD: ABNORMAL (ref 9–20)
CALCIUM SERPL-MCNC: 8.2 MG/DL (ref 8.6–10.4)
CHLORIDE BLD-SCNC: 97 MMOL/L (ref 98–107)
CO2: 32 MMOL/L (ref 20–31)
CREAT SERPL-MCNC: <0.4 MG/DL (ref 0.5–0.9)
DIFFERENTIAL TYPE: ABNORMAL
EOSINOPHILS RELATIVE PERCENT: 0 % (ref 0–4)
GFR AFRICAN AMERICAN: ABNORMAL ML/MIN
GFR NON-AFRICAN AMERICAN: ABNORMAL ML/MIN
GFR SERPL CREATININE-BSD FRML MDRD: ABNORMAL ML/MIN/{1.73_M2}
GFR SERPL CREATININE-BSD FRML MDRD: ABNORMAL ML/MIN/{1.73_M2}
GLUCOSE BLD-MCNC: 116 MG/DL (ref 65–105)
GLUCOSE BLD-MCNC: 128 MG/DL (ref 65–105)
GLUCOSE BLD-MCNC: 142 MG/DL (ref 65–105)
GLUCOSE BLD-MCNC: 91 MG/DL (ref 70–99)
HCT VFR BLD CALC: 33 % (ref 36–46)
HCT VFR BLD CALC: 35.5 % (ref 36–46)
HCT VFR BLD CALC: 35.9 % (ref 36–46)
HEMOGLOBIN: 10.9 G/DL (ref 12–16)
HEMOGLOBIN: 11.8 G/DL (ref 12–16)
HEMOGLOBIN: 11.8 G/DL (ref 12–16)
IMMATURE GRANULOCYTES: ABNORMAL %
INR BLD: 1.3
LYMPHOCYTES # BLD: 5 % (ref 24–44)
MAGNESIUM: 2.3 MG/DL (ref 1.6–2.6)
MCH RBC QN AUTO: 28.6 PG (ref 26–34)
MCHC RBC AUTO-ENTMCNC: 33 G/DL (ref 31–37)
MCV RBC AUTO: 86.9 FL (ref 80–100)
MONOCYTES # BLD: 6 % (ref 1–7)
MORPHOLOGY: ABNORMAL
MORPHOLOGY: ABNORMAL
NRBC AUTOMATED: ABNORMAL PER 100 WBC
PARTIAL THROMBOPLASTIN TIME: 29.7 SEC (ref 24–36)
PDW BLD-RTO: 16.1 % (ref 11.5–14.9)
PHOSPHORUS: 3 MG/DL (ref 2.6–4.5)
PLATELET # BLD: 265 K/UL (ref 150–450)
PLATELET ESTIMATE: ABNORMAL
PMV BLD AUTO: 8.8 FL (ref 6–12)
POTASSIUM SERPL-SCNC: 4.4 MMOL/L (ref 3.7–5.3)
PROTHROMBIN TIME: 16.1 SEC (ref 11.8–14.6)
RBC # BLD: 3.8 M/UL (ref 4–5.2)
RBC # BLD: ABNORMAL 10*6/UL
SEG NEUTROPHILS: 89 % (ref 36–66)
SEGMENTED NEUTROPHILS ABSOLUTE COUNT: 18.42 K/UL (ref 1.3–9.1)
SODIUM BLD-SCNC: 138 MMOL/L (ref 135–144)
SURGICAL PATHOLOGY REPORT: NORMAL
T3 FREE: 1.64 PG/ML (ref 2.02–4.43)
THYROXINE, FREE: 0.95 NG/DL (ref 0.93–1.7)
WBC # BLD: 20.7 K/UL (ref 3.5–11)
WBC # BLD: ABNORMAL 10*3/UL

## 2019-05-21 PROCEDURE — 84439 ASSAY OF FREE THYROXINE: CPT

## 2019-05-21 PROCEDURE — 6360000002 HC RX W HCPCS: Performed by: SURGERY

## 2019-05-21 PROCEDURE — 84100 ASSAY OF PHOSPHORUS: CPT

## 2019-05-21 PROCEDURE — 6370000000 HC RX 637 (ALT 250 FOR IP): Performed by: SURGERY

## 2019-05-21 PROCEDURE — 94640 AIRWAY INHALATION TREATMENT: CPT

## 2019-05-21 PROCEDURE — 85025 COMPLETE CBC W/AUTO DIFF WBC: CPT

## 2019-05-21 PROCEDURE — 85018 HEMOGLOBIN: CPT

## 2019-05-21 PROCEDURE — 2580000003 HC RX 258: Performed by: SURGERY

## 2019-05-21 PROCEDURE — 2700000000 HC OXYGEN THERAPY PER DAY

## 2019-05-21 PROCEDURE — 83735 ASSAY OF MAGNESIUM: CPT

## 2019-05-21 PROCEDURE — 6360000002 HC RX W HCPCS: Performed by: INTERNAL MEDICINE

## 2019-05-21 PROCEDURE — 80048 BASIC METABOLIC PNL TOTAL CA: CPT

## 2019-05-21 PROCEDURE — 97530 THERAPEUTIC ACTIVITIES: CPT

## 2019-05-21 PROCEDURE — 36415 COLL VENOUS BLD VENIPUNCTURE: CPT

## 2019-05-21 PROCEDURE — 74018 RADEX ABDOMEN 1 VIEW: CPT

## 2019-05-21 PROCEDURE — 2060000000 HC ICU INTERMEDIATE R&B

## 2019-05-21 PROCEDURE — 85730 THROMBOPLASTIN TIME PARTIAL: CPT

## 2019-05-21 PROCEDURE — 97168 OT RE-EVAL EST PLAN CARE: CPT

## 2019-05-21 PROCEDURE — 2500000003 HC RX 250 WO HCPCS: Performed by: INTERNAL MEDICINE

## 2019-05-21 PROCEDURE — C9113 INJ PANTOPRAZOLE SODIUM, VIA: HCPCS | Performed by: SURGERY

## 2019-05-21 PROCEDURE — 2500000003 HC RX 250 WO HCPCS: Performed by: SURGERY

## 2019-05-21 PROCEDURE — 85610 PROTHROMBIN TIME: CPT

## 2019-05-21 PROCEDURE — 99233 SBSQ HOSP IP/OBS HIGH 50: CPT | Performed by: INTERNAL MEDICINE

## 2019-05-21 PROCEDURE — 2500000003 HC RX 250 WO HCPCS: Performed by: STUDENT IN AN ORGANIZED HEALTH CARE EDUCATION/TRAINING PROGRAM

## 2019-05-21 PROCEDURE — 6370000000 HC RX 637 (ALT 250 FOR IP): Performed by: INTERNAL MEDICINE

## 2019-05-21 PROCEDURE — 84481 FREE ASSAY (FT-3): CPT

## 2019-05-21 PROCEDURE — 82947 ASSAY GLUCOSE BLOOD QUANT: CPT

## 2019-05-21 PROCEDURE — 94761 N-INVAS EAR/PLS OXIMETRY MLT: CPT

## 2019-05-21 PROCEDURE — 85014 HEMATOCRIT: CPT

## 2019-05-21 PROCEDURE — 6360000002 HC RX W HCPCS: Performed by: STUDENT IN AN ORGANIZED HEALTH CARE EDUCATION/TRAINING PROGRAM

## 2019-05-21 RX ORDER — METOPROLOL TARTRATE 5 MG/5ML
2.5 INJECTION INTRAVENOUS EVERY 8 HOURS
Status: DISCONTINUED | OUTPATIENT
Start: 2019-05-21 | End: 2019-05-26

## 2019-05-21 RX ADMIN — Medication 10 ML: at 08:25

## 2019-05-21 RX ADMIN — CALCIUM GLUCONATE: 94 INJECTION, SOLUTION INTRAVENOUS at 17:56

## 2019-05-21 RX ADMIN — FENTANYL CITRATE 50 MCG: 50 INJECTION INTRAMUSCULAR; INTRAVENOUS at 20:26

## 2019-05-21 RX ADMIN — IPRATROPIUM BROMIDE AND ALBUTEROL SULFATE 1 AMPULE: .5; 3 SOLUTION RESPIRATORY (INHALATION) at 10:35

## 2019-05-21 RX ADMIN — LORAZEPAM 2 MG: 2 INJECTION INTRAMUSCULAR; INTRAVENOUS at 15:16

## 2019-05-21 RX ADMIN — Medication 2 PUFF: at 20:26

## 2019-05-21 RX ADMIN — FENTANYL CITRATE 50 MCG: 50 INJECTION INTRAMUSCULAR; INTRAVENOUS at 02:31

## 2019-05-21 RX ADMIN — IPRATROPIUM BROMIDE AND ALBUTEROL SULFATE 1 AMPULE: .5; 3 SOLUTION RESPIRATORY (INHALATION) at 06:56

## 2019-05-21 RX ADMIN — MEROPENEM 1 G: 1 INJECTION, POWDER, FOR SOLUTION INTRAVENOUS at 02:31

## 2019-05-21 RX ADMIN — FENTANYL CITRATE 50 MCG: 50 INJECTION INTRAMUSCULAR; INTRAVENOUS at 11:38

## 2019-05-21 RX ADMIN — METOPROLOL TARTRATE 2.5 MG: 1 INJECTION, SOLUTION INTRAVENOUS at 15:15

## 2019-05-21 RX ADMIN — Medication 2 PUFF: at 08:35

## 2019-05-21 RX ADMIN — MEROPENEM 1 G: 1 INJECTION, POWDER, FOR SOLUTION INTRAVENOUS at 20:29

## 2019-05-21 RX ADMIN — LORAZEPAM 2 MG: 2 INJECTION INTRAMUSCULAR; INTRAVENOUS at 11:39

## 2019-05-21 RX ADMIN — IPRATROPIUM BROMIDE AND ALBUTEROL SULFATE 1 AMPULE: .5; 3 SOLUTION RESPIRATORY (INHALATION) at 14:48

## 2019-05-21 RX ADMIN — I.V. FAT EMULSION 100 ML: 20 EMULSION INTRAVENOUS at 17:56

## 2019-05-21 RX ADMIN — LORAZEPAM 2 MG: 2 INJECTION INTRAMUSCULAR; INTRAVENOUS at 08:18

## 2019-05-21 RX ADMIN — ENOXAPARIN SODIUM 30 MG: 30 INJECTION SUBCUTANEOUS at 08:18

## 2019-05-21 RX ADMIN — IPRATROPIUM BROMIDE AND ALBUTEROL SULFATE 1 AMPULE: .5; 3 SOLUTION RESPIRATORY (INHALATION) at 03:23

## 2019-05-21 RX ADMIN — METHYLPREDNISOLONE SODIUM SUCCINATE 40 MG: 40 INJECTION, POWDER, FOR SOLUTION INTRAMUSCULAR; INTRAVENOUS at 08:18

## 2019-05-21 RX ADMIN — AMIODARONE HYDROCHLORIDE 0.5 MG/MIN: 1.8 INJECTION, SOLUTION INTRAVENOUS at 02:28

## 2019-05-21 RX ADMIN — FUROSEMIDE 20 MG: 10 INJECTION, SOLUTION INTRAMUSCULAR; INTRAVENOUS at 10:31

## 2019-05-21 RX ADMIN — FUROSEMIDE 20 MG: 10 INJECTION, SOLUTION INTRAMUSCULAR; INTRAVENOUS at 17:35

## 2019-05-21 RX ADMIN — PANTOPRAZOLE SODIUM 40 MG: 40 INJECTION, POWDER, FOR SOLUTION INTRAVENOUS at 08:18

## 2019-05-21 RX ADMIN — MEROPENEM 1 G: 1 INJECTION, POWDER, FOR SOLUTION INTRAVENOUS at 10:28

## 2019-05-21 RX ADMIN — METOPROLOL TARTRATE 2.5 MG: 1 INJECTION, SOLUTION INTRAVENOUS at 20:25

## 2019-05-21 RX ADMIN — FENTANYL CITRATE 50 MCG: 50 INJECTION INTRAMUSCULAR; INTRAVENOUS at 05:18

## 2019-05-21 RX ADMIN — FENTANYL CITRATE 50 MCG: 50 INJECTION INTRAMUSCULAR; INTRAVENOUS at 17:30

## 2019-05-21 RX ADMIN — FENTANYL CITRATE 50 MCG: 50 INJECTION INTRAMUSCULAR; INTRAVENOUS at 10:26

## 2019-05-21 RX ADMIN — IPRATROPIUM BROMIDE AND ALBUTEROL SULFATE 1 AMPULE: .5; 3 SOLUTION RESPIRATORY (INHALATION) at 19:25

## 2019-05-21 ASSESSMENT — PAIN DESCRIPTION - FREQUENCY
FREQUENCY: CONTINUOUS
FREQUENCY: CONTINUOUS

## 2019-05-21 ASSESSMENT — PAIN SCALES - GENERAL
PAINLEVEL_OUTOF10: 0
PAINLEVEL_OUTOF10: 6
PAINLEVEL_OUTOF10: 10
PAINLEVEL_OUTOF10: 0
PAINLEVEL_OUTOF10: 10
PAINLEVEL_OUTOF10: 0
PAINLEVEL_OUTOF10: 0
PAINLEVEL_OUTOF10: 10
PAINLEVEL_OUTOF10: 6
PAINLEVEL_OUTOF10: 10

## 2019-05-21 ASSESSMENT — ENCOUNTER SYMPTOMS
SHORTNESS OF BREATH: 0
WHEEZING: 0
ABDOMINAL PAIN: 1

## 2019-05-21 ASSESSMENT — PAIN DESCRIPTION - PAIN TYPE
TYPE: ACUTE PAIN

## 2019-05-21 ASSESSMENT — PAIN DESCRIPTION - LOCATION
LOCATION: ABDOMEN
LOCATION: ABDOMEN;ARM;GENERALIZED
LOCATION: ABDOMEN;GENERALIZED
LOCATION: ABDOMEN
LOCATION: ABDOMEN

## 2019-05-21 ASSESSMENT — PAIN DESCRIPTION - DESCRIPTORS
DESCRIPTORS: SORE
DESCRIPTORS: CRAMPING

## 2019-05-21 ASSESSMENT — PAIN DESCRIPTION - PROGRESSION: CLINICAL_PROGRESSION: NOT CHANGED

## 2019-05-21 ASSESSMENT — PAIN - FUNCTIONAL ASSESSMENT: PAIN_FUNCTIONAL_ASSESSMENT: PREVENTS OR INTERFERES WITH ALL ACTIVE AND SOME PASSIVE ACTIVITIES

## 2019-05-21 ASSESSMENT — PAIN DESCRIPTION - ORIENTATION: ORIENTATION: LEFT

## 2019-05-21 ASSESSMENT — PAIN DESCRIPTION - ONSET: ONSET: ON-GOING

## 2019-05-21 NOTE — PROGRESS NOTES
Gave patient water , patient drank without issues.  Tried to give applesauce and jello patient refused to try

## 2019-05-21 NOTE — PROGRESS NOTES
meropenem  1 g Intravenous Q8H    mometasone-formoterol  2 puff Inhalation BID     Continuous Infusions:    PN-Adult 2-in-1 Central Line (Standard) 65 mL/hr at 19 1802    amiodarone 0.5 mg/min (19 0228)    dextrose      lactated ringers 10 mL/hr at 19 1917     PRN Meds: fentanNYL, LORazepam, metoprolol, glucose, dextrose, glucagon (rDNA), dextrose, sodium phosphate IVPB **OR** sodium phosphate IVPB, sodium chloride flush, ondansetron, potassium chloride, magnesium sulfate    Data:     Past Medical History:   has a past medical history of Abnormal computed tomography of cervical spine, CVA (cerebral vascular accident) ([address] GERD (gastroesophageal reflux disease), Hypertension, Paraproteinemia, and Weight loss. Social History:   reports that she has been smoking. She has a 30.00 pack-year smoking history. She has never used smokeless tobacco. She reports that she drank about 16.8 oz of alcohol per week. She reports that she does not use drugs. Family History: History reviewed. No pertinent family history. Vitals:  BP (!) 123/56   Pulse 103   Temp 97.7 °F (36.5 °C) (Oral)   Resp 22   Ht 5' (1.524 m)   Wt 97 lb 7.1 oz (44.2 kg)   SpO2 99%   BMI 19.03 kg/m²   Temp (24hrs), Av.8 °F (36.6 °C), Min:97.6 °F (36.4 °C), Max:98 °F (36.7 °C)    Recent Labs     19  0127 19  1227 19  1811 19  0032   POCGLU 114* 113* 130* 116*       I/O(24Hr):     Intake/Output Summary (Last 24 hours) at 2019 0859  Last data filed at 2019 0600  Gross per 24 hour   Intake 2242.75 ml   Output 5325 ml   Net -3082.25 ml       Labs:    CBC:   Lab Results   Component Value Date    WBC 20.7 2019    RBC 3.80 2019    RBC 3.66 2012    HGB 10.9 2019    HCT 33.0 2019    MCV 86.9 2019    MCH 28.6 2019    MCHC 33.0 2019    RDW 16.1 2019     2019     2012    MPV 8.8 2019     CBC with Differential: Lab Results   Component Value Date    WBC 20.7 05/21/2019    RBC 3.80 05/21/2019    RBC 3.66 05/23/2012    HGB 10.9 05/21/2019    HCT 33.0 05/21/2019     05/21/2019     05/23/2012    MCV 86.9 05/21/2019    MCH 28.6 05/21/2019    MCHC 33.0 05/21/2019    RDW 16.1 05/21/2019    METASPCT 2 05/15/2019    LYMPHOPCT 5 05/21/2019    MONOPCT 6 05/21/2019    MYELOPCT 1 05/07/2019    BASOPCT 0 05/21/2019    MONOSABS 1.24 05/21/2019    LYMPHSABS 1.04 05/21/2019    EOSABS 0.00 05/21/2019    BASOSABS 0.00 05/21/2019    DIFFTYPE NOT REPORTED 05/21/2019     CMP:    Lab Results   Component Value Date     05/21/2019    K 4.4 05/21/2019    CL 97 05/21/2019    CO2 32 05/21/2019    BUN 27 05/21/2019    CREATININE <0.40 05/21/2019    GFRAA CANNOT BE CALCULATED 05/21/2019    LABGLOM CANNOT BE CALCULATED 05/21/2019    GLUCOSE 91 05/21/2019    GLUCOSE 87 05/21/2012    PROT 5.2 05/18/2019    LABALBU 3.5 05/18/2019    LABALBU 4.6 05/17/2012    CALCIUM 8.2 05/21/2019    BILITOT 0.37 05/18/2019    ALKPHOS 62 05/18/2019    AST 13 05/18/2019    ALT 11 05/18/2019     BMP:    Lab Results   Component Value Date     05/21/2019    K 4.4 05/21/2019    CL 97 05/21/2019    CO2 32 05/21/2019    BUN 27 05/21/2019    LABALBU 3.5 05/18/2019    LABALBU 4.6 05/17/2012    CREATININE <0.40 05/21/2019    CALCIUM 8.2 05/21/2019    GFRAA CANNOT BE CALCULATED 05/21/2019    LABGLOM CANNOT BE CALCULATED 05/21/2019    GLUCOSE 91 05/21/2019    GLUCOSE 87 05/21/2012     BUN/Creatinine:    Lab Results   Component Value Date    BUN 27 05/21/2019    CREATININE <0.40 05/21/2019     Hepatic Function Panel:    Lab Results   Component Value Date    ALKPHOS 62 05/18/2019    ALT 11 05/18/2019    AST 13 05/18/2019    PROT 5.2 05/18/2019    BILITOT 0.37 05/18/2019    BILIDIR 0.21 05/11/2019    IBILI 0.17 05/11/2019    LABALBU 3.5 05/18/2019    LABALBU 4.6 05/17/2012     Albumin:    Lab Results   Component Value Date    LABALBU 3.5 05/18/2019    LABALBU 4.6 05/17/2012       Lab Results   Component Value Date/Time    SPECIAL R AC 5CC PURPLE 3CC RED 05/16/2019 12:20 PM     Lab Results   Component Value Date/Time    CULTURE NO GROWTH 4 DAYS 05/16/2019 12:20 PM       Radiology:    Ct Abdomen Pelvis Wo Contrast Additional Contrast? Oral    Result Date: 4/14/2019  EXAMINATION: CT OF THE ABDOMEN AND PELVIS WITHOUT CONTRAST 4/14/2019 7:34 pm TECHNIQUE: CT of the abdomen and pelvis was performed without the administration of intravenous contrast. Multiplanar reformatted images are provided for review. Dose modulation, iterative reconstruction, and/or weight based adjustment of the mA/kV was utilized to reduce the radiation dose to as low as reasonably achievable. COMPARISON: 04/11/2019 HISTORY: ORDERING SYSTEM PROVIDED HISTORY: ABDOMINAL PAIN TECHNOLOGIST PROVIDED HISTORY: Water soluble contrast only please Ordering Physician Provided Reason for Exam: Abdominal pain - Vented patient. Contrast given via nurse through NG tube. Acuity: Unknown Type of Exam: Unknown Relevant Medical/Surgical History: Hx - Sepsis due to urinary tract infection. FINDINGS: Lower Chest: New moderate layering bilateral pleural effusions with bilateral lower lobe atelectasis. Organs: Limited evaluation due lack of intravenous contrast.  Cholelithiasis redemonstrated. No gallbladder wall thickening or biliary ductal dilatation. Scattered tiny hypodense lesions in the liver are too small to characterize but statistically represent benign cysts or hemangiomas and appear unchanged. The pancreas, spleen, adrenal glands, and kidneys are unremarkable. There is no hydronephrosis or urinary tract calculus. GI/Bowel: The stomach is distended. Enteric tube is in place. No contrast is seen distal to the pylorus and there is contrast reflux into the distal esophagus. There is no evidence of bowel obstruction. The appendix is not definitely visualized. No focal pericecal inflammatory changes are evident. Pelvis: The urinary bladder is decompressed by Tran catheter. No pelvic mass is seen. Peritoneum/Retroperitoneum: Small amount of free fluid in the pelvic cavity. No free air or focal fluid collection. No abnormal lymph node. Normal abdominal aortic caliber. Moderate calcific atherosclerosis. Bones/Soft Tissues: No acute osseous abnormality. Diffuse anasarca. Moderate degenerative changes in the lumbar spine. 1. Distended, contrast filled stomach with reflux of contrast into the esophagus. No enteric contrast is seen distal to the pylorus suggesting delayed gastric emptying in the setting of ileus. 2. New moderate layering bilateral pleural effusions with bilateral lower lobe atelectasis. 3. Small amount of nonspecific free fluid in the pelvic cavity. 4. Anasarca. 5. Cholelithiasis. Xr Chest (single View Frontal)    Result Date: 5/11/2019  EXAMINATION: ONE XRAY VIEW OF THE CHEST 5/11/2019 6:28 am COMPARISON: 10 May 2019 HISTORY: ORDERING SYSTEM PROVIDED HISTORY: Respiratory failure TECHNOLOGIST PROVIDED HISTORY: Respiratory failure Ordering Physician Provided Reason for Exam: Respiratory failure Acuity: Unknown Type of Exam: Unknown FINDINGS: AP portable view of the chest time stamped at 603 hours demonstrates findings of generalized COPD. Overlying cardiac monitoring electrodes are present. An intestinal tube extends below the fundus of the stomach, tip not included. Right central line terminates in the distal superior vena cava. Bipolar pacemaker enters from the left with intact leads in appropriate positions. Heart size is stable, top-normal.  Lung fields are hyperinflated suggestive of COPD. Interstitial markings are coarsened, similar to that noted in 2015 likely chronic interstitial change. There is improved aeration at the left lung base with slight blunting of the left costophrenic angle suggesting effusion.   There is been interval clearing of right basilar opacities likely resolved atelectasis. Osseous and mediastinal structures are age-appropriate. No vascular congestion or extrapleural air is noted. Improved aeration of both lung bases with resolved right basilar opacity. Mild left basilar opacity persists with blunting of the left costophrenic angle with small effusion on the left suspected. Findings of COPD and suspected chronic fibrotic change are noted. No extrapleural air. Tubes and lines as above. Xr Chest (single View Frontal)    Result Date: 5/2/2019  EXAMINATION: SINGLE XRAY VIEW OF THE CHEST 5/2/2019 6:34 am COMPARISON: 29 April 2019 HISTORY: ORDERING SYSTEM PROVIDED HISTORY: COPD TECHNOLOGIST PROVIDED HISTORY: COPD Ordering Physician Provided Reason for Exam: COPD Acuity: Acute Type of Exam: Initial FINDINGS: AP portable view of the chest time stamped at 630 hours demonstrates stable cardiac size. Overlying cardiac monitoring electrodes are present. Right-sided PICC line terminates in the right atrium. Bipolar pacemaker enters from the left with intact leads in appropriate positions. There is been no significant interval change in bilateral interstitial opacities, representing interval change since 2015. This may be related to worsening interstitial disease or superimposed venous congestion. Bilateral effusions are noted with bibasilar opacities, either atelectasis or edema. Continued bilateral effusions, bibasilar opacities and bilateral interstitial opacities in the upper lobes. Findings may be related to worsening interstitial disease and edema. Airspace disease is not excluded.      Xr Chest (single View Frontal)    Result Date: 4/13/2019  EXAMINATION: SINGLE XRAY VIEW OF THE CHEST 4/13/2019 7:18 am COMPARISON: April 12, 2019 HISTORY: ORDERING SYSTEM PROVIDED HISTORY: dyspnea TECHNOLOGIST PROVIDED HISTORY: dyspnea Ordering Physician Provided Reason for Exam: dyspnea Acuity: Acute Type of Exam: Subsequent/Follow-up Additional signs and symptoms: dyspnea interstitial opacities, slightly improved when compared to the previous study, possibly related to underlying fibrotic change or residual infiltrate. Xr Knee Left (1-2 Views)    Result Date: 5/8/2019  EXAMINATION: 2 XRAY VIEWS OF THE LEFT KNEE 5/7/2019 11:19 am COMPARISON: Left knee radiographs 03/30/2019. HISTORY: ORDERING SYSTEM PROVIDED HISTORY: fracture TECHNOLOGIST PROVIDED HISTORY: fracture Ordering Physician Provided Reason for Exam: left knee/distal femur pain Acuity: Acute Type of Exam: Initial FINDINGS: Bones are osteopenic. No suprapatellar joint effusion. There is a impacted, transverse fracture of the distal femoral metadiaphysis. Increased sclerosis along the fracture line suggests interval healing. Fracture fragments are in unchanged, near anatomic alignment. No acute dislocation. No new fracture is seen. Impacted, transverse fracture of the distal femoral metadiaphysis is in unchanged alignment and demonstrates interval healing. Xr Abdomen (kub) (single Ap View)    Result Date: 5/10/2019  EXAMINATION: ONE SUPINE XRAY VIEW(S) OF THE ABDOMEN 5/10/2019 9:14 am COMPARISON: Small-bowel series 05/09/2019 HISTORY: ORDERING SYSTEM PROVIDED HISTORY: Small bowel obstruction ([address] TECHNOLOGIST PROVIDED HISTORY: TO ACCOMPANY SMALL BOWEL EXAM SMALL BOWEL OBSTRUCTION Ordering Physician Provided Reason for Exam: SMALL BOWEL FOLLOW THRU - 20 HOUR DELAYED FILM FINDINGS: Significant interval decreased amount of retained contrast in the stomach compared to last image from earlier small bowel series which likely relates to suctioning of the contrast.  Majority of previously seen contrast within the pelvis likely in the rectum is no longer visualized and has likely passed through. Residual contrast remains within the colon and small bowel. NG tube is in place with side hole in the region of the GE junction. Partially visualized mild left pleural effusion associated atelectasis.      1. Interval dose to as low as reasonably achievable. COMPARISON: April 14, 2019 HISTORY: ORDERING SYSTEM PROVIDED HISTORY: Check for abscess/fluid collection around ashley tube site. TECHNOLOGIST PROVIDED HISTORY: Ordering Physician Provided Reason for Exam: Patient c/o abd pain around her ashley site and drainage tube. FINDINGS: Evaluation is limited by motion. Lower Chest: Moderate bilateral pleural effusions. Organs: Multiple liver hypodensities measuring up to 4 mm are incompletely characterized but in the absence of risk factors likely represent cysts requiring no specific follow-up. Cholecystostomy tube is in place. No adjacent focal drainable fluid collection. No pancreatic lesion. No splenomegaly. No adrenal lesion. No hydronephrosis. No renal lesion. GI/Bowel: Stomach is markedly distended. Swirled appearance of the mesentery is seen within the mid to lower abdomen with adjacent thickened loops of small bowel. Peritoneum/Retroperitoneum: Atherosclerotic calcification of the abdominal aorta without aneurysmal dilatation. No adenopathy. Bones/Soft Tissues: No acute soft tissue abnormality. Diffuse osteopenia. Lumbar spine degenerative changes. Bowel obstruction which is suspected to be caused by an internal hernia. Cholecystostomy tube is in place. No surrounding fluid collection. Nm Gi Blood Loss    Result Date: 5/3/2019  EXAMINATION: NUCLEAR MEDICINE GASTRIC BLEEDING STUDY 5/3/2019 TECHNIQUE: Following the intravenous injection of 23.8 mCi of 99 mTc-labeled RBC's, a flow study and standard images of the abdomen was obtained over a total period of 60 minutes COMPARISON: None. HISTORY: ORDERING SYSTEM PROVIDED HISTORY: GI BLEEDING TECHNOLOGIST PROVIDED HISTORY: Ordering Physician Provided Reason for Exam: GI bleeding Acuity: Unknown Type of Exam: Unknown FINDINGS: Activity is seen within the blood pool, including the great vessels, heart, liver, and spleen.   There was no abnormal accumulation of radioactivity in the abdomen to suggest active bleeding during study acquisition. No evidence of active GI bleeding during acquisition. RECOMMENDATIONS: If the patient shows hemodynamic signs of an active bleed in the next 20 hours, additional images can be acquired. Kate Marcano No Device Plmt/replace/removal    Result Date: 4/30/2019  PROCEDURE: ULTRASOUND GUIDED VASCULAR ACCESS. FLUOROSCOPY GUIDED PICC PLACEMENT 4/30/2019. HISTORY: ORDERING SYSTEM PROVIDED HISTORY: TPN TECHNOLOGIST PROVIDED HISTORY: TPN Lumen?->Double Lumen SEDATION: None FLUOROSCOPY DOSE AND TYPE OR TIME AND EXPOSURES: 7 seconds; D  TECHNIQUE: This procedure was performed by Dr. Basilio Paris. Informed consent was obtained after a detailed explanation of the procedure including risks, benefits, and alternatives. Universal protocol was observed. The right arm was prepped and draped in sterile fashion using maximum sterile barrier technique. Local anesthesia was achieved with lidocaine. A micropuncture needle was used to access the right basilic vein using ultrasound guidance. An ultrasound image demonstrating patency of the vein with needle tip located within it. An image was obtained and stored in PACs. A 0.018 guidewire was used to place a peel-a-way sheath and a 5 Persian dual-lumen PICC was advanced with fluoroscopic guidance with the tip at the cavo-atrial junction. The catheter flushed easily and there was a good blood return. The catheter was secured to the skin. The patient tolerated the procedure well and there were no immediate complications. FINDINGS: Fluoroscopic image demonstrates the tip of the catheter at the cavo-atrial junction.      Successful ultrasound and fluoroscopy guided PICC placement     Us Gallbladder Ruq    Result Date: 4/19/2019  EXAMINATION: RIGHT UPPER QUADRANT ULTRASOUND 4/19/2019 10:48 am COMPARISON: CT abdomen and pelvis 4/14/2018 HISTORY: ORDERING SYSTEM PROVIDED HISTORY: ABDOMINAL PAIN FINDINGS: Pancreas is not well visualized. No liver lesion. Gallbladder wall thickening. Gallbladder sludge. Cholelithiasis. Pericholecystic fluid. Common bile duct measures at the upper limits of normal at 7 mm. Findings suggesting acute cholecystitis. Xr Chest Portable    Result Date: 5/10/2019  EXAMINATION: ONE XRAY VIEW OF THE CHEST 5/10/2019 1:28 am COMPARISON: May 2, 2019. HISTORY: ORDERING SYSTEM PROVIDED HISTORY: low o2 sat TECHNOLOGIST PROVIDED HISTORY: low o2 sat Ordering Physician Provided Reason for Exam: Low o2 sat Acuity: Acute Type of Exam: Initial FINDINGS: Hyperexpanded right lung volume. Left greater than right basilar heterogeneous opacities with left basilar consolidation. Small bilateral pleural effusions. Stable cardiomediastinal silhouette and great vessels. Enteric contrast in the stomach and distal esophagus. Enteric tube courses into the stomach but tip is not definitely seen due to contrast in the stomach. Stable left pectoral cardiac pacer device. Stable right PICC. Left greater than right basilar heterogeneous opacities with left basilar consolidation. Differential considerations include atelectasis and an infectious/inflammatory process. Small bilateral pleural effusions. Enteric contrast in the stomach and distal esophagus. Enteric tube courses into the stomach but tip is not definitely seen due to contrast in the stomach. Xr Chest Portable    Result Date: 4/27/2019  EXAMINATION: SINGLE XRAY VIEW OF THE CHEST 4/27/2019 8:47 am COMPARISON: 04/26/2019 HISTORY: ORDERING SYSTEM PROVIDED HISTORY: Assess for pulm edema vs PNA TECHNOLOGIST PROVIDED HISTORY: Assess for pulm edema vs PNA Ordering Physician Provided Reason for Exam: cough, congestion Acuity: Acute Type of Exam: Initial FINDINGS: Right IJ central venous catheter is in place tip in the SVC right atrial junction. Pacer wires are in place. The heart and mediastinal structures are stable.   Pleural effusions are Unknown FINDINGS: Enteric tube in the stomach. ET tube is been removed. Right IJ catheter terminates in the atrial caval junction. Bipolar pacer on the left unchanged. Heart and mediastinum unremarkable. Moderate edema unchanged. Small effusions, left greater than right. Bony thorax intact. Status post extubation. Life support apparatus otherwise stable. Moderate edema and effusions unchanged. Xr Chest Portable    Result Date: 4/19/2019  EXAMINATION: SINGLE XRAY VIEW OF THE CHEST 4/19/2019 5:51 am COMPARISON: April 18, 2019 HISTORY: ORDERING SYSTEM PROVIDED HISTORY: ETT placement TECHNOLOGIST PROVIDED HISTORY: ETT placement Ordering Physician Provided Reason for Exam: Vent Pt check ETT placement Acuity: Acute Type of Exam: Subsequent/Follow-up Additional signs and symptoms: Vent Pt check ETT placement FINDINGS: Tubes and lines are unchanged. Persistent bibasilar lung opacities and pleural effusions. Mild pulmonary edema. No significant interval change. Xr Chest Portable    Result Date: 4/18/2019  EXAMINATION: SINGLE XRAY VIEW OF THE CHEST 4/18/2019 6:21 am COMPARISON: April 17, 2019 HISTORY: ORDERING SYSTEM PROVIDED HISTORY: ETT placement TECHNOLOGIST PROVIDED HISTORY: ETT placement Ordering Physician Provided Reason for Exam: on vent Acuity: Acute Type of Exam: Initial FINDINGS: Right IJ line and NG tube are stable. Interval advancement of ET tube terminating 3.1 cm from ron. Implanted cardiac device is present. Left-sided effusion and associated airspace disease is stable. Hazy right basilar opacity possibly edema or atelectasis. No pneumothorax. Increased opacity left upper chest possibly focal area of atelectasis, new from prior. Advanced ETT from prior exam, now 3.1 cm from ron. Increased opacity left upper chest suspicious for atelectasis. Additional supporting devices are stable.      Xr Chest Portable    Result Date: 4/17/2019  EXAMINATION: SINGLE XRAY VIEW OF THE CHEST in vascular congestion with resolution of perihilar opacities. Some bibasilar opacities remain. No extrapleural air is noted. Osseous structures are stable. Interval improvement in vascular congestion and bilateral opacities consistent with resolving pulmonary edema. Tubes and lines as above. Xr Chest Portable    Result Date: 4/14/2019  EXAMINATION: SINGLE XRAY VIEW OF THE CHEST 4/14/2019 7:57 am COMPARISON: Portable chest 04/13/2019. HISTORY: ORDERING SYSTEM PROVIDED HISTORY: Intubation TECHNOLOGIST PROVIDED HISTORY: Intubation Ordering Physician Provided Reason for Exam: intubation Acuity: Acute Type of Exam: Initial Additional signs and symptoms: intubation FINDINGS: Endotracheal tube terminates over the midthoracic trachea. Dual-chamber pacemaker leads appear unchanged in position. Right IJ approach central venous catheter unchanged in position. Heart size not substantially changed. Perihilar and basilar opacities further increased. Left pleural effusion increased in size. Findings may reflect pulmonary edema, progressed from yesterday's exam.  Left pleural effusion increased in size.      Vl Upper Extremity Bilateral Venous Duplex    Result Date: 4/22/2019    [facility]  Vascular Upper Extremities Veins Procedure   Patient Name   Yunior Rowland     Date of Study           04/22/2019                 Aubrie Carmen   Date of Birth  1950  Gender                  Female   Age            77 year(s)  Race                       Room Number    2002        Height:                 59.84 inch, 152 cm   Corporate ID # [id number]    Weight:                 102 pounds, 46.3 kg   Patient Acct # [de-identified]   BSA:        1.4 m^2     BMI:       20.03 kg/m^2   MR #           [id number]      Sonographer             Roderick Mario   Accession #    [id number]   Interpreting Physician  Keren Pinon   Referring                  Referring Physician     Federico Martinez *  Nurse  Practitioner  Procedure Type of Study:   Veins: Upper Extremities Veins, Venous Scan Upper Bilateral.  Indications for Study:Swelling. Patient Status: In Patient. Technical Quality:Limited visualization. Limitation reason:Line placements. Conclusions   Summary   No evidence of superficial or deep venous thrombosis in both upper  extremities. Signature   ----------------------------------------------------------------  Electronically signed by Roderick Mario(Sonographer) on  04/22/2019 11:46 AM  ----------------------------------------------------------------   ----------------------------------------------------------------  Electronically signed by Negrito Oliveira(Interpreting  physician) on 04/22/2019 09:31 PM  ----------------------------------------------------------------  Findings:   Right Impression:                  Left Impression:  Internal jugular vein not          The internal jugular, subclavian,  visualized due to line placement. axillary, brachial, radial, and ulnar                                     veins demonstrate normal  Subclavian, axillary, brachial,    compressibility with phasic Doppler  radial, and ulnar veins            signals. demonstrate normal compressibility  with phasic Doppler signals. Normal compressibility of the cephalic                                     vein. Normal compressibility of the  cephalic vein. Normal compressibility of the basilic                                     vein. Normal compressibility of the  basilic vein. Velocities are measured in cm/s ; Diameters are measured in cm Right UE Vein Measurements 2D Measurements +------------------------------------+----------+---------------+----------+ ! Location                            ! Visualized! Compressibility! Thrombosis! +------------------------------------+----------+---------------+----------+ ! Prox SCV                            ! Yes       ! Yes            ! None      ! +------------------------------------+----------+---------------+----------+ ! Dist SCV                            ! Yes       ! Yes            ! None      ! +------------------------------------+----------+---------------+----------+ ! Prox Axillary                       ! Yes       ! Yes            ! None      ! +------------------------------------+----------+---------------+----------+ ! Dist Axillary                       ! Yes       ! Yes            ! None      ! +------------------------------------+----------+---------------+----------+ ! Prox Brachial                       !Yes       ! Yes            ! None      ! +------------------------------------+----------+---------------+----------+ ! Dist Brachial                       !Yes       ! Yes            ! None      ! +------------------------------------+----------+---------------+----------+ ! Prox Radial                         !Yes       ! Yes            ! None      ! +------------------------------------+----------+---------------+----------+ ! Dist Radial                         !Yes       ! Yes            ! None      ! +------------------------------------+----------+---------------+----------+ ! Prox Ulnar                          ! Yes       ! Yes            ! None      ! +------------------------------------+----------+---------------+----------+ ! Dist Ulnar                          ! Yes       ! Yes            ! None      ! +------------------------------------+----------+---------------+----------+ ! Basilic at UA                       ! Yes       ! Yes            ! None      ! +------------------------------------+----------+---------------+----------+ ! Basilic at AF                       ! Yes       ! Yes            ! None      ! +------------------------------------+----------+---------------+----------+ ! Yessenia at [address]                       ! Yes       ! Yes            ! None      ! +------------------------------------+----------+---------------+----------+ ! Gideon at UA !Yes       !Yes            ! None      ! +------------------------------------+----------+---------------+----------+ ! Cephalic at AF                      ! Yes       ! Yes            ! None      ! +------------------------------------+----------+---------------+----------+ ! Cephalic at [address]                      ! Yes       ! Yes            ! None      ! +------------------------------------+----------+---------------+----------+ Doppler Measurements +-------------------------+-----------------------+------------------------+ ! Location                 ! Signal                 !Reflux                  ! +-------------------------+-----------------------+------------------------+ ! SCV                      ! Phasic                 ! No                      ! +-------------------------+-----------------------+------------------------+ ! Axillary                 ! Phasic                 ! No                      ! +-------------------------+-----------------------+------------------------+ ! Brachial                 !Phasic                 ! No                      ! +-------------------------+-----------------------+------------------------+ Left UE Vein Measurements 2D Measurements +------------------------------------+----------+---------------+----------+ ! Location                            ! Visualized! Compressibility! Thrombosis! +------------------------------------+----------+---------------+----------+ ! Prox IJV                            ! Yes       ! Yes            ! None      ! +------------------------------------+----------+---------------+----------+ ! Dist IJV                            ! Yes       ! Yes            ! None      ! +------------------------------------+----------+---------------+----------+ ! Prox SCV                            ! Yes       ! Yes            ! None      ! +------------------------------------+----------+---------------+----------+ ! Dist SCV                            ! Yes       ! Yes !None      ! +------------------------------------+----------+---------------+----------+ ! Prox Axillary                       ! Yes       ! Yes            ! None      ! +------------------------------------+----------+---------------+----------+ ! Dist Axillary                       ! Yes       ! Yes            ! None      ! +------------------------------------+----------+---------------+----------+ ! Prox Brachial                       !Yes       ! Yes            ! None      ! +------------------------------------+----------+---------------+----------+ ! Dist Brachial                       !Yes       ! Yes            ! None      ! +------------------------------------+----------+---------------+----------+ ! Prox Radial                         !Yes       ! Yes            ! None      ! +------------------------------------+----------+---------------+----------+ ! Dist Radial                         !Yes       ! Yes            ! None      ! +------------------------------------+----------+---------------+----------+ ! Prox Ulnar                          ! Yes       ! Yes            ! None      ! +------------------------------------+----------+---------------+----------+ ! Dist Ulnar                          ! Yes       ! Yes            ! None      ! +------------------------------------+----------+---------------+----------+ ! Basilic at UA                       ! Yes       ! Yes            ! None      ! +------------------------------------+----------+---------------+----------+ ! Cephalic at UA                      ! Yes       ! Yes            ! None      ! +------------------------------------+----------+---------------+----------+ ! Cephalic at AF                      ! Yes       ! Yes            ! None      ! +------------------------------------+----------+---------------+----------+ Doppler Measurements +-------------------------+-----------------------+------------------------+ ! Location                 ! Signal                 !Reflux ! +-------------------------+-----------------------+------------------------+ ! IJV                      ! Phasic                 !                        ! +-------------------------+-----------------------+------------------------+ ! SCV                      ! Phasic                 !                        ! +-------------------------+-----------------------+------------------------+ ! Axillary                 ! Phasic                 !                        ! +-------------------------+-----------------------+------------------------+ ! Brachial                 !Phasic                 !                        ! +-------------------------+-----------------------+------------------------+    Vl Dup Lower Extremity Venous Bilateral    Result Date: 5/7/2019    [facility]  Vascular Lower Extremities DVT Study Procedure   Patient Name   [facility]UC[facility]     Date of Study           05/07/2019                 Samantha Her   Date of Birth  1950  Gender                  Female   Age            77 year(s)  Race                       Room Number    2123        Height:                 59.84 inch, 152 cm   Corporate ID # [id number]    Weight:                 102 pounds, 46.3 kg   Patient Acct # [de-identified]   BSA:        1.4 m^2     BMI:      20.03 kg/m^2   MR #           [id number]      Sonographer             Noemí Humphries, [facility]   Accession #    [id number]   Interpreting Physician  Yvette Benz   Referring                  Referring Physician     Cindy Martin MD  Nurse  Practitioner  Procedure Type of Study:   Veins: Lower Extremities DVT Study, Venous Scan Lower Bilateral.  Indications for Study:Pain and swelling. Patient Status: In Patient. Technical Quality:Limited visualization. Limitation reason:patient movement. Conclusions   Summary   No evidence of superficial or deep venous thrombosis in both lower  extremities. Technically limited visualization.    Signature ----------------------------------------------------------------  Electronically signed by Jojo Marquez RVT(Sonographer) on  05/07/2019 01:22 PM  ----------------------------------------------------------------   ----------------------------------------------------------------  Electronically signed by Saba Oliveira(Interpreting  physician) on 05/07/2019 06:45 PM  ----------------------------------------------------------------  Findings:   Right Impression:                    Left Impression:  The common femoral, femoral,         The common femoral, femoral,  popliteal and tibial veins           popliteal and tibial veins  demonstrate normal compressibility   demonstrate normal compressibility  and augmentation. and augmentation. Limited visualization of the calf    Limited visualization of the calf  veins. veins. Normal compressibility of the great  Normal compressibility of the great  saphenous vein. saphenous vein. Normal compressibility of the small  Normal compressibility of the small  saphenous vein. saphenous vein. Velocities are measured in cm/s ; Diameters are measured in cm Right Lower Extremities DVT Study Measurements Right 2D Measurements +------------------------------------+----------+---------------+----------+ ! Location                            ! Visualized! Compressibility! Thrombosis! +------------------------------------+----------+---------------+----------+ ! Common Femoral                      !Yes       ! Yes            ! None      ! +------------------------------------+----------+---------------+----------+ ! Prox Femoral                        !Yes       ! Yes            ! None      ! +------------------------------------+----------+---------------+----------+ ! Mid Femoral                         !Yes       ! Yes            ! None      ! +------------------------------------+----------+---------------+----------+ ! Dist Femoral                        !Yes       ! Yes            ! None      ! +------------------------------------+----------+---------------+----------+ ! Deep Femoral                        !Yes       ! Yes            ! None      ! +------------------------------------+----------+---------------+----------+ ! Popliteal                           !Yes       ! Yes            ! None      ! +------------------------------------+----------+---------------+----------+ ! Sapheno Femoral Junction            ! Yes       ! Yes            ! None      ! +------------------------------------+----------+---------------+----------+ ! PTV                                 ! Partial   !Yes            ! None      ! +------------------------------------+----------+---------------+----------+ ! Peroneal                            !Partial   !Yes            ! None      ! +------------------------------------+----------+---------------+----------+ ! Gastroc                             ! Yes       ! Yes            ! None      ! +------------------------------------+----------+---------------+----------+ ! GSV Thigh                           ! Yes       ! Yes            ! None      ! +------------------------------------+----------+---------------+----------+ ! GSV Knee                            ! Yes       ! Yes            ! None      ! +------------------------------------+----------+---------------+----------+ ! GSV Ankle                           ! Yes       ! Yes            ! None      ! +------------------------------------+----------+---------------+----------+ ! SSV                                 ! Partial   !Yes            ! None      ! +------------------------------------+----------+---------------+----------+ Left Lower Extremities DVT Study Measurements Left 2D Measurements +------------------------------------+----------+---------------+----------+ ! Location +------------------------------------+----------+---------------+----------+ ! GSV Ankle                           ! Yes       ! Yes            ! None      ! +------------------------------------+----------+---------------+----------+ ! SSV                                 ! Partial   !Yes            ! None      ! +------------------------------------+----------+---------------+----------+    Ir Cholecystostomy Percutaneous Complete    Result Date: 4/22/2019  PROCEDURE: ULTRASOUND and fluoroscopic GUIDED CHOLECYSTOSTOMY TUBE PLACEMENT April 22, 2019 HISTORY: ORDERING SYSTEM PROVIDED HISTORY: acute cholecystitis TECHNOLOGIST PROVIDED HISTORY: acute cholecystitis SEDATION: 75 mcg IV fentanyl for pain TECHNIQUE: Informed consent was obtained after a detailed explanation of the procedure including risks, benefits, and alternatives. Universal protocol was followed. A suitable skin site was prepped and draped in sterile fashion following ultrasound localization. An 18 gauge needle was advanced under ultrasound guidance into the gallbladder via transhepatic route and a 0.035 guidewire was used to place a 8 [place] cholecystostomy tube under fluoroscopic guidance. The catheter was sutured to the skin and the patient tolerated the procedure well. Thick bilious material was sent for laboratory analysis. The catheter was attached to gravity drainage. FINDINGS: Ultrasound image demonstrates distended gallbladder. Bilious fluid was sent for culture. Successful placement of transhepatic 8 [place] percutaneous cholecystostomy tube. Nm Hepatobiliary Scan W Ejection Fraction    Result Date: 4/20/2019  EXAMINATION: NUCLEAR MEDICINE HEPATOBILIARY SCINTIGRAPHY (HIDA SCAN). 4/20/2019 2:15 pm TECHNIQUE: Approximately 5.6 [id number] Mebrofenin (Choletec) was administered IV. Then, dynamic images of the abdomen were obtained in the anterior projection for 60 mins. A right lateral view was also obtained at 60 mins.  Delayed images up to 4 hours were obtained. COMPARISON: Ultrasound 04/19/2019 HISTORY: ORDERING SYSTEM PROVIDED HISTORY: CHOLECYSTITIS TECHNOLOGIST PROVIDED HISTORY: Ordering Physician Provided Reason for Exam: Cholecystitis Acuity: Unknown Type of Exam: Unknown FINDINGS: Prompt, homogenous uptake by the liver is noted with normal appearance of radiotracer excretion into the biliary system. Clearance of bloodpool activity appears appropriate. Normal small bowel activity is seen. Prominent activity within the common bile duct. The gallbladder is not visualized by the end of the exam.     Absent filling of the gallbladder consistent with acute cholecystitis. Fl Small Bowel Follow Through Only    Result Date: 5/10/2019  EXAMINATION: SMALL BOWEL FOLLOW THROUGH SERIES 5/9/2019 TECHNIQUE: Small bowel follow through series was performed with overhead images. FLUOROSCOPY DOSE AND TYPE OR TIME AND EXPOSURES: No fluoroscopic images obtained. COMPARISON: CT abdomen pelvis 05/05/2019 HISTORY: ORDERING SYSTEM PROVIDED HISTORY: internal hernia TECHNOLOGIST PROVIDED HISTORY: Water soluble contrast only. Study to be done ONLY if NGT is placed by IR internal hernia FINDINGS:  image demonstrates NG tube overlying the left side of the abdomen within the stomach. Surgical drain overlies the right side of the abdomen. There is a nonspecific bowel gas pattern with mild dilated loops of bowel in lower abdomen. Initial images demonstrate filling of the stomach. At 1 hour and 45 minutes no significant contrast is noted in the small bowel. The 4-1/2 hour image demonstrates contrast within loops of bowel within the mid and lower abdomen and pelvis. There appears to be contrast extending to the colon in the right side of the abdomen. Contrast is noted within the pelvis which may be within the bladder or rectum. Significant delay in emptying of the stomach with persistent contrast noted at the 6 hour concha.  There is contrast extending into the bowel which appears to be in the colon. Subtle findings are limited. Consider follow-up radiograph of the abdomen in 6-8 hours. Ir Place Ng Tube By Dr Finesse Ellis    Result Date: 5/9/2019  PROCEDURE: XR PLACE NASOGASTRIC TUBE PHYS 5/9/2019 HISTORY: ORDERING SYSTEM PROVIDED HISTORY: ileus TECHNOLOGIST PROVIDED HISTORY: ileus Ordering Physician Provided Reason for Exam: ILEUS Acuity: Unknown Type of Exam: Unknown CONTRAST: None SEDATION: None FLUOROSCOPY DOSE AND TYPE OR TIME AND EXPOSURES: 21 seconds; D AP 46 DESCRIPTION OF PROCEDURE AND FINDINGS: This procedure was performed by Dr. Italia Vyas. Under intermittent fluoroscopic guidance a 12 [place] nasogastric tube was placed through the right nostril and directed under fluoroscopy into the stomach. A small amount of air was injected confirming the tip location in the stomach. Partially visualized cholecystostomy tube and pacer wires. Tube was secured in place to the patient's nose with an adhesive dressing. 12 [place] nasogastric tube placed successfully with its tip in the stomach. Physical Examination:        Physical Exam   Constitutional: She appears distressed. HENT:   Head: Normocephalic and atraumatic. Eyes: Pupils are equal, round, and reactive to light. Pulmonary/Chest: No respiratory distress. She has no wheezes. She has no rales. Abdominal: She exhibits no distension. There is tenderness. There is guarding. Musculoskeletal: She exhibits edema (Significantly improved upper extremity edema). Neurological: No cranial nerve deficit. Skin: Skin is warm and dry. Capillary refill takes 2 to 3 seconds. She is not diaphoretic. Vitals reviewed.      Vitals:    05/21/19 0548 05/21/19 0600 05/21/19 0656 05/21/19 0700   BP:  (!) 109/53  (!) 123/56   Pulse: 104 100  103   Resp: 25 26 21 22   Temp:       TempSrc:       SpO2: 97% 98% 96% 99%   Weight:  97 lb 7.1 oz (44.2 kg)     Height:         Assessment:        Primary Problem  Acute respiratory failure [facility])    Active Hospital Problems    Diagnosis Date Noted    Acute respiratory failure ([facility] [J96.00] 05/18/2019    Small bowel obstruction ([address] [K56.609]     Anxiety disorder [F41.9]     Noncompliance [Z91.19]     Anemia [D64.9]     GI bleed [K92.2] 05/03/2019    Hemorrhage of rectum and anus [K62.5]     Centrilobular emphysema ([address] [J43.2] 04/30/2019    CRP elevated [R79.82]     Elevated procalcitonin [R79.89]     Bandemia [D72.825]     Cholecystitis [K81.9]     Abdominal distention [R14.0]     Elevated CEA [R97.0]     Elevated CA 19-9 level [R97.8]     Urinary retention [R33.9]     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]     Septic shock ([address] [A41.9, R65.21]     Leukemoid reaction [D72.823]     Aspiration pneumonia of right lower lobe due to vomit ([facility] [address] [J69.0]     MRSA carrier [Z22.322]     Sepsis due to urinary tract infection ([address] [A41.9, N39.0] 04/11/2019    Cystitis [N30.90] 04/11/2019     Plan:        Acute respiratory failure s/p ex-lap, open cholecystectomy, R colectomy w/ ileocolic anastomosis, extensive enterolysis, POD#6  - Pulm following - extubated on 5/20  - CXR - stable congestion, small B/L pleural effusions L>R, unchanged  - Solumedrol 40mg qd, continuing to wean  - Lasix 20mg IV BID  - Surgery following - pending KUB (nonobstructive gas pattern) for starting enteral feeds  - ID Following - Merrem (day 11)    Severe Anemia 2/2 ABLA vs. AOCD, improved  - Hgb 10.9  - Monitor for dark/black stools, FOBT+ on 5/19    New-onset A.fib, improved  - NSR overnight  - Cardiology following - amio gtt    DVT PPx  - Lovenox  Dispo  - PT/OT  - SW following    Gladys Escoto MD  5/21/2019  8:59 AM         Attestation and add on       I have discussed the care of Angelika Jay , including pertinent history and exam findings,   5/21/19  with the resident.   I have seen and examined the patient and the key elements of all parts of the encounter have been performed by me .   I agree with the assessment, plan and orders as documented by the resident. Principal Problem:    Acute respiratory failure (HCC)  Active Problems:    Sepsis due to urinary tract infection (HCC)    Cystitis    Emphysematous cystitis    Septic shock (HCC)    Leukemoid reaction    Aspiration pneumonia of right lower lobe due to vomit (HCC)    MRSA carrier    Urinary retention    Abdominal distention    Elevated CEA    Elevated CA 19-9 level    Cholecystitis    CRP elevated    Elevated procalcitonin    Bandemia    Centrilobular emphysema (HCC)    Hemorrhage of rectum and anus    GI bleed    Anemia    Small bowel obstruction (HCC)    Anxiety disorder    Noncompliance  Resolved Problems:    * No resolved hospital problems. *         Condition       [x] high risk      [x] ill ,   [] critical        --improving ---     Hemodynamic status  . ..... [x] stable    [] borderline -improving   [] unstable                              -----   Ventilatory  status  . [place] Brewster On   [x] oxygen Rx  ,    [] on bipap ,   [] on ventilator    extubated today     NUTRITION:     [] NPO [x] Tube Feeding  [] TPN  [] PO                    Diet: DIET TUBE FEED CONTINUOUS/CYCLIC NPO; Semi-elemental; Nasogastric; Continuous; 10; 10; Exceptions are: Sips with Meds  PN-Adult 2-in-1 Central Line (Standard)    . ........... Fluid , electrolyte therapy  ;  ....... Monroe Brewster Continuous Infusions:      PN-Adult 2-in-1 Central Line (Standard) 65 mL/hr at 05/20/19 1802    amiodarone 0.5 mg/min (05/21/19 0228)    dextrose      lactated ringers 10 mL/hr at 05/17/19 1917   . Monroe Brewster   Drug therapy ;  Scheduled Meds:      enoxaparin  30 mg Subcutaneous Daily    metoprolol  2.5 mg Intravenous Q8H    ipratropium-albuterol  1 ampule Inhalation Q4H    methylPREDNISolone  40 mg Intravenous Daily    furosemide  20 mg Intravenous BID    fat emulsion  100 mL Intravenous Daily    insulin lispro  0-6 Units Subcutaneous Q6H    sodium chloride flush  10 mL Intravenous 2 times per day    pantoprazole  40 mg Intravenous Daily    And    sodium chloride (PF)  10 mL Intravenous Daily    alteplase  1 mg Intracatheter Once    meropenem  1 g Intravenous Q8H    mometasone-formoterol  2 puff Inhalation BID        Consultation  Reviewed ,  .    [] Cardiology           [] Nephrology  []. Hemo onco    [] GI    [] ID      [x] Pulmonary      [] Neuro                                  [] ---         Following input noted ; Acute hypoxic respiratory failure  Status post laparotomy with right colectomy and open cystectomy  A. fib with RVR new onset  History of treated aspiration pneumonia  Severe COPD  Bilateral pleural effusions left greater than right           Neuro   She is agitated at times but appropriate     Respiratory   Wean oxygen as tolerated. Keep O2 sat > 88%  Continue aerosols every 4 hours  She is unable to do incentive spirometry  Cardiovascular   Increased pleural effusion on left, may be due to volume overload versus poor nutritional status and low protein     Gastrointestinal   On TPN                                -----               Nathaniel Torres    5/21/19    YEE STRONG [address], [address] [place] [address]. Phone [phone]   Fax: [phone]  Answering Service: [phone]                     .

## 2019-05-21 NOTE — CARE COORDINATION
DISCHARGE PLANNING NOTE:    Plan is for this patient to either stay on TPN and go to [facility] or tolerate feeds and stop TPN and go to [facility]. Remains on IV merrem, lasix 20 BID, steroids 40 daily. Will continue to follow along with LSW for any discharge needs.      Electronically signed by Jovita Alcantar RN on 5/21/2019 at 3:54 PM

## 2019-05-21 NOTE — PROGRESS NOTES
Nutrition Assessment (Parenteral Nutrition)    Type and Reason for Visit: Reassess    Nutrition Recommendations:  Continue TPN with the following adjustments to additives: Na Cl: 35 to 45 mEq, Mg Sulfate: 15 to 10 mEq, Insulin: 6 to 5 units, remove MVI and Trace Elements. Continue 100 mL Lipids daily. Nutrition Assessment: Pt remains stable from a nutritional viewpoint with TPN and lipids continuing for nutrition support. Await decision from surgeon regarding possible restart of tube feeding. Pt was extubated yesterday. Will continue to monitor nutrition progression. Malnutrition Assessment:  · Malnutrition Status: At risk for malnutrition  · Context: Acute illness or injury  · Findings of the 6 clinical characteristics of malnutrition (Minimum of 2 out of 6 clinical characteristics is required to make the diagnosis of moderate or severe Protein Calorie Malnutrition based on AND/ASPEN Guidelines):  1. Energy Intake-Greater than 75% of estimated energy requirement, (Variable at times; over 75% of needs met for majority of admission)    2. Weight Loss-Unable to assess(edema present),    3. Fat Loss-Unable to assess(Pt is thin; edema present; no known recent loss of muscle or fat),    4. Muscle Loss-Unable to assess,    5. Fluid Accumulation-Moderate to severe fluid accumulation, Extremities, Generalized  6.   Strength-Not measured    Nutrition Risk Level: High    Nutrient Needs:  · Estimated Daily Total Kcal: 7097-0227 based on 35-39 kcal per kg of usual body wt  · Estimated Daily Protein (g): 63-68 based on 1.4-1.5 gm/kg IBW(revised)    Nutrition Diagnosis:   · Problem: Inadequate oral intake  · Etiology: related to Alteration in GI function, Insufficient energy/nutrient consumption     Signs and symptoms:  as evidenced by NPO status due to medical condition, Nutrition support - PN    Objective Information:  · Nutrition-Focused Physical Findings: Edema: +1 pitting generalized; +3 pitting RUE; +4 pitting LUE; +1 pitting RLE, LLE. · Wound Type: Deep Tissue Injury, Stage II, Pressure Ulcer, Surgical Wound  · Current Nutrition Therapies:  · Oral Diet Orders: NPO   · Parenteral Nutrition Orders:  · Type and Formula: Premix Central, 2-in-1 Custom   · Lipids: 100ml  · Rate/Volume: 65/1560 ml daily  · Duration: Continuous  · Current PN Order Provides: 1573 kcal, 78 gm pro  · Goal PN Orders Provides: Total nutrition support goal: 2162-6354 kcal, 63-68 gm protein  · Anthropometric Measures:  · Ht: 5' (152.4 cm)   · Current Body Wt: 97 lb 7.1 oz (44.2 kg)(wt fluctuates with edema, fluid balance)  · Admission Body Wt: 102 lb (46.3 kg)  · Usual Body Wt: 89 lb 1.1 oz (40.4 kg)(3-30-19)  · Ideal Body Wt: 100 lb (45.4 kg), % Ideal Body 89% (based on wt 5-12)  · BMI Classification: BMI 18.5 - 24.9 Normal Weight    Nutrition Interventions:   Continue NPO, Modify current Parenteral Nutrition  Continued Inpatient Monitoring    Nutrition Evaluation:   · Evaluation: Progressing toward goals   · Goals: Adequate nutritional intake or provision    · Monitoring: Nutrition Progression, PN Intake, PN Tolerance, Wound Healing, I&O, Monitor Hemodynamic Status, Monitor Bowel Function, Weight, Pertinent Labs    Cherry Melgoza R.D., L.D.   Phone: [phone]

## 2019-05-21 NOTE — PLAN OF CARE
Problem: Falls - Risk of:  Goal: Will remain free from falls  Description  Will remain free from falls  Outcome: Ongoing  Note:   No attempt to get oob. Safe environment provided. Bed down and locked. Problem: Pain:  Goal: Control of acute pain  Description  Control of acute pain  Note:   Pt c/oabd pain and was medicated times 3. Repositioned for comfort as needed.  Restful environment provided

## 2019-05-21 NOTE — PROGRESS NOTES
Date     05/21/2019    K 4.4 05/21/2019    CL 97 05/21/2019    CO2 32 05/21/2019    BUN 27 05/21/2019    CREATININE <0.40 05/21/2019    GLUCOSE 91 05/21/2019    GLUCOSE 87 05/21/2012       LFTS  Lab Results   Component Value Date    ALKPHOS 62 05/18/2019    ALT 11 05/18/2019    AST 13 05/18/2019    PROT 5.2 05/18/2019    BILITOT 0.37 05/18/2019    BILIDIR 0.21 05/11/2019    IBILI 0.17 05/11/2019    LABALBU 3.5 05/18/2019    LABALBU 4.6 05/17/2012       ABG ABGs:   Lab Results   Component Value Date    PHART 7.487 05/20/2019    PO2ART 62.2 05/20/2019    HTS3DGT 48.9 05/20/2019       Lab Results   Component Value Date    MODE PRVC 05/20/2019         INR  Recent Labs     05/19/19  0619 05/20/19  0608 05/21/19  0417   PROTIME 17.7* 16.6* 16.1*   INR 1.5 1.3 1.3       APTT  Recent Labs     05/19/19  0619 05/20/19  0608 05/21/19  0417   APTT 31.1 75.3* 29.7       Lactic Acid  Lab Results   Component Value Date    LACTA 1.9 05/16/2019    LACTA 1.8 05/12/2019    LACTA 0.9 05/11/2019        BNP   No results for input(s): BNP in the last 72 hours. Cultures       Radiology     CXR      CT Scans    (See actual reports for details)      SYSTEMS ASSESSMENT    Acute hypoxic respiratory failure  Status post laparotomy with right colectomy and open cystectomy  A. fib with RVR new onset  History of treated aspiration pneumonia  Severe COPD  Bilateral pleural effusions left greater than right        Neuro   She is agitated at times but appropriate    Respiratory   Wean oxygen as tolerated.  Keep O2 sat > 88%  Continue aerosols every 4 hours  She is unable to do incentive spirometry  Cardiovascular   Increased pleural effusion on left, may be due to volume overload versus poor nutritional status and low protein     Gastrointestinal   On TPN    Renal   Potassium has improved    Infectious Disease   Continue Merrem    Hematology/Oncology   Hemoglobin and hematocrit are stable    Endocrine   Blood sugars okay    Social/Spiritual/DNR/Disposition/Other     No family is here  Critical Care Time   0 min    Electronically signed by Jessica Mak MD on 5/21/2019 at 9:43 AM

## 2019-05-21 NOTE — PROGRESS NOTES
[address] [facility]   Occupational Therapy Re-Evaluation  Date: 19  Patient Name: Joss Wild       Room:   MRN: [id number]  Account: [de-identified]   : 1950  (77 y.o.) Gender: female   Discharge Recommendations:  [address]  Referring Practitioner: Evangelina Slaughter D.O  Diagnosis: Sepsis due to UTI   Treatment Diagnosis: impaired ADL status, weakness , impaired mobility,impaired coordination  Past Medical History:  has a past medical history of Abnormal computed tomography of cervical spine, CVA (cerebral vascular accident) ([facility] [address] GERD (gastroesophageal reflux disease), Hypertension, Paraproteinemia, and Weight loss. Past Surgical History:   has a past surgical history that includes pacemaker placement (2011); intubation (2019); Upper gastrointestinal endoscopy (N/A, 2019); laparoscopy (N/A, 5/15/2019); and Cholecystectomy (N/A, 5/15/2019).   Restrictions  Restrictions/Precautions: Fall Risk, Contact Precautions(ESBL , droplet)  Implants present? : Pacemaker  Other position/activity restrictions:  NG tube,Indwelling catheter, MORGAN drain,  R foot IV, RUE IV, L sided weakness from previous CVA   Vitals  Temp: 97.9 °F (36.6 °C)  Pulse: 105  Resp: 24  BP: (!) 94/44  Height: 5' (152.4 cm)  Weight: 97 lb 7.1 oz (44.2 kg)  BMI (Calculated): 19.1  Oxygen Therapy  SpO2: 93 %  Pulse Oximeter Device Mode: Continuous  Pulse Oximeter Device Location: Finger  O2 Device: None (Room air)  Skin Protection for O2 Device: No  FiO2 : 28 %  O2 Flow Rate (L/min): 2 L/min  End Tidal CO2: 31 (%)  Blood Gas  Performed?: Yes  Chele's Test #1: Collateral flow confirmed  Site #1: Right Radial  Site Prepped #1: Yes  Number of Attempts #1: 1  Pressure Held #1: Yes  Complications #1: None  Post-procedure #1: Standard  Specimen Status #1: Point of care  How Tolerated?: Tolerated well  Level of Consciousness: Alert  Subjective  Subjective: Pt reports pain all over.   Comments: Pt seen for OT re-eval. Pt extubated 5-20-19  Overall Orientation Status: Within Functional Limits  Vision  Vision: Within Functional Limits  Hearing  Hearing: Within functional limits  Social/Functional History  Lives With: Alone  Type of Home: Apartment  Home Layout: One level  Home Access: Level entry  Bathroom Shower/Tub: Walk-in shower  Bathroom Toilet: Standard  Bathroom Equipment: Shower chair, Grab bars around toilet, Grab bars in [address]: Rolling walker, Marcela Jennyfer, Wheelchair-electric  Receives Help From: Home health  ADL Assistance: Needs assistance  Homemaking Assistance: Needs assistance  Ambulation Assistance: Needs assistance  Transfer Assistance: Needs assistance  Active : No  Patient's  Info: Does not drive  Occupation: Retired  Type of occupation: Pt states she was a . Leisure & Hobbies: sitting outside. Additional Comments: Per chart pt was at [facility]. Pain Assessment  Pain Assessment: 0-10  Pain Level: 10  Patient's Stated Pain Goal: No pain  Pain Type: Acute pain  Pain Location: Abdomen, Arm, Generalized  Pain Orientation: Left  Pain Descriptors: Sore  Pain Frequency: Continuous  Objective  ADL  Feeding: NPO  Grooming: Maximum assistance  UE Bathing: Maximum assistance, Dependent/Total  LE Bathing: Dependent/Total  UE Dressing: Dependent/Total, Maximum assistance  LE Dressing: Dependent/Total  Toileting: Dependent/Total(pt has dyer catheter)  Additional Comments: Pt dependent on nursing for ADLs (grooming,dressing, bathing, toileting) Pt has dyer catheter. UE Function  Hand Dominance  Hand Dominance: Right  LUE Strength  LUE Strength Comment: BNLs  LUE Tone: Normotonic  LUE PROM (degrees)  LUE General PROM: Tightness lt shoulder.   L Shoulder Flex  0-180: 45-90 w tightness  L Shoulder ABduction 0-180: 45-90 w tightness  L Elbow Flexion 0-145: 90 w pt reporting pain  L Elbow Extension 145-0: wfl  LUE AROM (degrees)  LUE General AROM: BNLs. Pt didn't move lt hand during OT. Left Hand PROM (degrees)  Left Hand PROM: WFL  Left Hand AROM (degrees)  Left Hand General AROM: BNL  RUE Strength  R Shoulder Flex: 3/5  R Shoulder ABduction: 3/5  R Elbow Flex: 3+/5  R Elbow Ext: 3+/5  R Wrist Flex: 3+/5  R Wrist Ext: 3+/5  RUE Strength Comment: BNLs   RUE Tone: Normotonic  RUE PROM (degrees)  RUE General PROM: wfls for elbow,wrist , hand. PROM verly limited this afternoon to less than 45 w pain reports. Tightness rt shoulder. RUE AROM (degrees)  RUE General AROM: Elbow,wrist,forearm - wfls. Pt didn't move rt shoulder statiing it hurts. Right Hand AROM (degrees)  Right Hand AROM: WFL  Fine Motor Skills  Coordination  Movements Are Fluid And Coordinated: No  Coordination and Movement description: Gross motor impairments, Fine motor impairments, Left UE, Decreased speed, Right UE   LUE Edema - Circumference (cm)  LUE Edema Present?: Yes(Swelling lt hand)   Mobility  Balance  Sitting Balance: (TBA)  Standing Balance: (TBA -deferred pt tired)  Bed mobility  Comment: TBA. Pt didn't want to be moved for bed mobility. Transfers  Sit to stand: (TBA when pt appropriate)  Stand to sit: (TBA when pt appropriate)  Assessment  Performance deficits / Impairments: Decreased functional mobility , Decreased ADL status, Decreased ROM, Decreased strength, Decreased endurance, Decreased coordination  Assessment: Pt has pain during parts of PROM for bilateral shoulders and lt hand. Swelling lt hand. Pt will benefit from OT to improve pt's participation in selfcare, increase UB strength,rom, & coordination  for selfcare and bed mobility. Treatment Diagnosis: impaired ADL status, weakness , impaired mobility,impaired coordination  Prognosis: Fair  Decision Making: Medium Complexity  History: Pt admitted w abdominal pain for 3 days. 4-16-19 EGD  Exam: 6 performance deficits  Assistance / Modification: Pt needs extensive assist w selfcare activities. Patient Education: OT explained that OT will resume seeing her now that she has been extubated. Barriers to Learning: none  REQUIRES OT FOLLOW UP: Yes  Treatment Initiated : PROM  bilateral UE.AAROM & AROM rt UE  Discharge Recommendations: Subacute/Skilled Nursing Facility  Activity Tolerance: Patient limited by fatigue  Activity Tolerance: Pain w PROM lt UE. Exercises  Other: PROM bilateral UEs. AAROM and AROM rt UE. Functional Outcome Measures  AM-PAC Daily Activity Inpatient   How much help for putting on and taking off regular lower body clothing?: Total  How much help for Bathing?: Total  How much help for Toileting?: Total  How much help for putting on and taking off regular upper body clothing?: Total  How much help for taking care of personal grooming?: Total  How much help for eating meals?: (NG tube - NPO)  AM-PAC Inpatient Daily Activity Raw Score: 9  AM-PAC Inpatient ADL T-Scale Score : 25.33  ADL Inpatient CMS 0-100% Score: 79.59  ADL Inpatient CMS G-Code Modifier : CL  Goals  Patient Goals   Patient goals :  To do as much for herself as she can  Short term goals  Time Frame for Short term goals: by discharge  Short term goal 1: pt will participate in 15-20 minutes bilateral UE therapeutic exercises to improve rt UE strength,increase lt UE (rom,coordination,decrease swelling)  Short term goal 2: pt will demonstrates increase indpendence w ADLS (needing min assist w grooming, UB bathing and dressing) using pacing/ECWS technique  Short term goal 3: Improve bed mobility for selfcare activities  Short term goal 4: Educate pt in ECWS techniques for selfcare  Short term goal 5: Assess safety w transfers  & progress to lower body ADLS when pt is appropriate  Plan  Safety Devices  Safety Devices in place: Yes  Type of devices: Call light within reach, Left in bed, Patient at risk for falls  Plan  Times per week: 5  Times per day: Daily  Current Treatment Recommendations: ROM, Strengthening, Patient/Caregiver Education & Training, Self-Care / ADL, Safety Education & Training, Functional Mobility Training, Equipment Evaluation, Education, & procurement(coordination)  Plan Comment: continue OT  OT Individual Minutes  Time In: 7668  Time Out: 0877  Minutes: 16    Electronically signed by Suzy Daly OT on 5/21/19 at 3:26 PM

## 2019-05-21 NOTE — FLOWSHEET NOTE
05/21/19 1053   Encounter Summary   Services provided to: Patient and family together   Referral/Consult From: Rounding   Continue Visiting   (5/21/19V)   Volunteer Visit Yes   Complexity of Encounter Low   Length of Encounter 15 minutes   Sacraments   Communion Family received communion

## 2019-05-21 NOTE — PROGRESS NOTES
Physical Therapy  Facility/Department: [facility] ICU  Daily Treatment Note  NAME: Ashvin Ferreira  : 1950  MRN: [id number]    Date of Service: 2019    Discharge Recommendations:  ECF with PT       Patient Diagnosis(es): The primary encounter diagnosis was Urinary retention. Diagnoses of Emphysematous cystitis, Septicemia ([address] and Small bowel obstruction ([address] were also pertinent to this visit. has a past medical history of Abnormal computed tomography of cervical spine, CVA (cerebral vascular accident) ([address] GERD (gastroesophageal reflux disease), Hypertension, Paraproteinemia, and Weight loss. has a past surgical history that includes pacemaker placement (2011); intubation (2019); Upper gastrointestinal endoscopy (N/A, 2019); laparoscopy (N/A, 5/15/2019); and Cholecystectomy (N/A, 5/15/2019). Restrictions  Restrictions/Precautions  Restrictions/Precautions: Fall Risk, Contact Precautions(Droplet pneumonia, ESBL)  Implants present? : Pacemaker  Position Activity Restriction  Other position/activity restrictions: Indwelling catheter, MORGAN drain, O2/nc, R foot IV, RUE IV, L sided weakness from previous CVA     Subjective   General  Chart Reviewed: Yes  Additional Pertinent Hx: admitted 19 due to abdominal pain  Response To Previous Treatment: Patient with no complaints from previous session. Family / Caregiver Present: No  Subjective  Subjective: Pt yelling out several times during session saying \"Help me! \" due to abdominal pain. Explained to pt that she is not yet due for pain medication. General Comment  Comments: Per RNHerman, pt OK to participate in PT session, but is not due for pain medication at this time.   Pain Screening  Patient Currently in Pain: Yes  Pain Assessment  Pain Assessment: 0-10  Pain Level: 10  Pain Location: Abdomen  Pain Descriptors: Cramping  Pain Frequency: Continuous  Pain Onset: On-going  Clinical Progression: Not changed  Functional Pain Assessment: Prevents or interferes with all active and some passive activities  Non-Pharmaceutical Pain Intervention(s): Distraction; Emotional support;Repositioned  Response to Pain Intervention: Other (Comment)(Pain unchanged.)  Vital Signs  Patient Currently in Pain: Yes  Pre Treatment Pain Screening  Intervention List: Nurse/physician notified; Pt educated regarding timing of pain meds; Patient unable to be redirected to participate in therapy  Comments / Details: Pt requires max encouragement to participate, but once sitting edge of bed, pt agitated and yelling. Returned to supine, RN aware of request for pain medication. Orientation  Orientation  Overall Orientation Status: Within Functional Limits  Orientation Level: Oriented to person(Pt does not seem to understand origin of abdominal pain (abd sx))    Objective   Bed mobility  Rolling to Left: Maximum assistance  Rolling to Right: Unable to assess  Supine to Sit: Maximum assistance  Sit to Supine: Maximum assistance  Scooting: Dependent/Total;2 Person assistance(To head of bed, and positioned on back.)  Comment: Pt sat edge of bed approximately 3 minutes, requiring max encouragement and education on importance of OOB mobility to prevent further effects of bedrest.     Transfers  Comment: Pt unable to perform any transfers at this time due to pain/weakness. Balance  Posture: Fair  Sitting - Static: Poor(Pt requires max assist to maintain upright posture while sitting EOB. Pt leaning heavily to L.  )       Exercises  Comments: Pt educated on importance of circulatory exercises, and LE strengthening/ROM. Pt continuously refusing to move LE's due to abdominal pain. Assessment   Body structures, Functions, Activity limitations: Decreased endurance;Decreased functional mobility ; Decreased strength;Decreased balance;Decreased ROM; Decreased safe awareness  Assessment: Treatment limited due to patients high anxiety due to abdominal pain.   Prognosis: Fair  Patient Education: Circulation/LE ROM exercises, pursed lip breathing technique for relaxation/pain control, supine<>sit transfer, and importance of OOB activity to prevent further pneumonia and effects of bed rest.  Barriers to Learning: high pain  REQUIRES PT FOLLOW UP: Yes  Activity Tolerance  Activity Tolerance: Patient limited by pain; Patient limited by fatigue;Patient limited by endurance       AM-PAC Score     AM-PAC Inpatient Mobility without Stair Climbing Raw Score : 7  AM-PAC Inpatient without Stair Climbing T-Scale Score : 28.66  Mobility Inpatient CMS 0-100% Score: 86.29  Mobility Inpatient without Stair CMS G-Code Modifier : CM      Goals  Short term goals  Time Frame for Short term goals: 10 visits  Short term goal 1: improve strength RUE/RLE to 4/5 to assist in bed mobility and transfers  Short term goal 2: improve bed mobility to minx1  Short term goal 3: improve transfers to minx1  Short term goal 4: progress to Gait and/or use of wheelchair for mobility  Patient Goals   Patient goals : return home    Plan    Plan  Times per week: 5-7x/wk  Times per day: Daily  Specific instructions for Next Treatment: progress to mobility as able  Current Treatment Recommendations: ROM, Strengthening, Functional Mobility Training, Transfer Training, Balance Training, Endurance Training, Wheelchair Mobility Training  Plan Comment: to continue PT per POC  Safety Devices  Type of devices: Left in bed, Bed alarm in place, All fall risk precautions in place  Restraints  Initially in place: No  Restraints: B wrist restraint     Therapy Time   Individual Concurrent Group Co-treatment   Time In 0930         Time Out 0956         Minutes 26           Moihnder Morgan PTA

## 2019-05-21 NOTE — PROGRESS NOTES
by interventional radiology,  cultures on 4/22 grew Candida non-albicans and one colony of ESBL Klebsiella. PICC line was placed   Tagged RBC scan  was negative . CT abdomen 5/5 showed no fluid collection ,Bowel obstruction which is suspected to be caused by an internal hernia. NG tube was placed under fluoroscopy 5/9. She had a small bowel follow-through 5/9, developed respiratory failure with hypoxia, tachycardia and tachypnea was transferred to ICU placed on BiPAP, blood pressure on the low side required Levophed,WBC up to 28.5    code status was changed to [facility] on 5/13. S/p right colectomy with ileocolic anastomosis,open cholecystectomy and excision of small bowel diverticulum 5/15 . Interval history :  She was extubated yesterday. She is complaining of diffuse abdominal pain, remains on TPN, no reported fever . Past Medical History:   Diagnosis Date    Abnormal computed tomography of cervical spine     sclerotic bone appearance     CVA (cerebral vascular accident) ([address]     left  side weakness    GERD (gastroesophageal reflux disease)     Hypertension     Paraproteinemia     Weight loss       Past Surgical History:   Procedure Laterality Date    CHOLECYSTECTOMY N/A 5/15/2019    CHOLECYSTECTOMY performed by Frank Plasencia DO at [facility]  4/14/2019         LAPAROSCOPY N/A 5/15/2019    LAPAROSCOPY EXPLORATORY CONVERTED TO EXPLORATORY LAPAROTOMY/ RIGHT COLON RESECTION AND ANASTAMOSIS/ OPEN CHOLECYSTECTOMY/ EXTENSIVE LYSIS OF ADHESIONS performed by rFank Plasencia DO at [facility] 10  07/2011    Pacemaker is Medtronic Revo (compatible). Leads placed in 1995 are NOT MRI compatible. Placed at [facility]. V's per Dr. Eveline Antoine can not have an MRI.     UPPER GASTROINTESTINAL ENDOSCOPY N/A 4/16/2019    EGD ESOPHAGOGASTRODUODENOSCOPY @ BEDSIDE  ICU 2002 performed by Christie Harmon MD at [id number]          Admission Meds  No current facility-administered medications on file prior to encounter. Current Outpatient Medications on File Prior to Encounter   Medication Sig Dispense Refill    oxyCODONE-acetaminophen (PERCOCET) 5-325 MG per tablet Take 1 tablet by mouth every 6 hours as needed for Pain.  senna-docusate (PERICOLACE) 8.6-50 MG per tablet Take 1 tablet by mouth 2 times daily      budesonide-formoterol (SYMBICORT) 160-4.5 MCG/ACT AERO Inhale 2 puffs into the lungs 2 times daily      sucralfate (CARAFATE) 1 GM/10ML suspension Take 1 g by mouth 4 times daily       traZODone (DESYREL) 50 MG tablet Take 50 mg by mouth nightly      tiZANidine (ZANAFLEX) 2 MG tablet Take 2 mg by mouth every 8 hours as needed (left knee)       aspirin 81 MG tablet Take 81 mg by mouth daily      clopidogrel (PLAVIX) 75 MG tablet TAKE 1 TABLET DAILY 30 tablet 2    DOCQLACE 100 MG capsule TAKE 1 CAPSULE BY MOUTH IN THE MORNING & IN THE EVENING -DRINK PLENTYOF WATER WHILE TAKING THIS MEDICINE 30 capsule 1    amLODIPine (NORVASC) 10 MG tablet Take 10 mg by mouth daily.  LORazepam (ATIVAN) 0.5 MG tablet Take 0.5 mg by mouth every 6 hours as needed for Anxiety.  therapeutic multivitamin-minerals (THERAGRAN-M) tablet Take 1 tablet by mouth daily.  omeprazole (PRILOSEC) 20 MG capsule Take 20 mg by mouth daily.  venlafaxine (EFFEXOR) 37.5 MG tablet Take 37.5 mg by mouth 3 times daily.  Misc. Devices [facility]) [address] [facility] wheelchair with left fupper extremity support  Dx: stroke with left hemiparesis. 1 each 0           Allergies  Allergies   Allergen Reactions    Penicillins Shortness Of Breath and Rash    Amoxicillin         Social   Social History     Tobacco Use    Smoking status: Current Some Day Smoker     Packs/day: 1.00     Years: 30.00     Pack years: 30.00    Smokeless tobacco: Never Used   Substance Use Topics    Alcohol use: Not Currently     Alcohol/week: 16.8 oz     Types: 28 Glasses of wine per week                History reviewed.  No pertinent family history. Review of Systems  Other than above 10 systems reviewed negative    Tolerating antibiotics. Physical Exam  BP (!) 107/52   Pulse 94   Temp 97.9 °F (36.6 °C) (Oral)   Resp 21   Ht 5' (1.524 m)   Wt 97 lb 7.1 oz (44.2 kg)   SpO2 97%   BMI 19.03 kg/m²           General Appearance: Awake, oriented ,not under distress  . Skin: warm and dry, no rash or erythema  Head: normocephalic and atraumatic  Eyes: pupils equal, round  Neck: neck supple and non tender   Pulmonary/Chest: coarse to auscultation bilaterally- with  rhonchi  Cardiovascular: normal rate, regular rhythm, normal S1 and S2, no murmurs. Abdomen: soft,surgical INCISION INTACT ,NO ERYTHEMA  ,MORGAN drain serous   Extremities: no cyanosis, clubbing   Edema   PICC line in place       Data Review:    Recent Labs     05/19/19 0619 05/20/19 0608 05/20/19 1808 05/21/19 0417   WBC 9.1 20.5*  --  20.7*   HGB 8.5* 9.9* 11.0* 10.9*   HCT 24.9* 31.0* 34.0* 33.0*   MCV 84.2 87.4  --  86.9    394  --  265     Recent Labs     05/19/19 0619 05/19/19  1407 05/19/19 2021 05/20/19  0608 05/21/19  0417   *  --   --  140 138   K 3.2* 3.1* 4.7 5.0 4.4     --   --  100 97*   CO2 31  --   --  33* 32*   PHOS 1.9* 2.4*  --  2.4* 3.0   BUN 14  --   --  21 27*   CREATININE <0.40*  --   --  <0.40* <0.40*     No results for input(s): AST, ALT, ALB, BILIDIR, BILITOT, ALKPHOS in the last 72 hours. No results for input(s): LIPASE, AMYLASE in the last 72 hours.   Recent Labs     05/19/19 0619 05/20/19 0608 05/21/19 0417   PROTIME 17.7* 16.6* 16.1*   INR 1.5 1.3 1.3       Imaging Studies:                           All appropriate imaging studies and reports reviewed: Yes       Ct Abdomen Pelvis Wo Contrast Additional Contrast? Oral    Result Date: 4/14/2019  EXAMINATION: CT OF THE ABDOMEN AND PELVIS WITHOUT CONTRAST 4/14/2019 7:34 pm TECHNIQUE: CT of the abdomen and pelvis was performed without the administration of intravenous into the esophagus. No enteric contrast is seen distal to the pylorus suggesting delayed gastric emptying in the setting of ileus. 2. New moderate layering bilateral pleural effusions with bilateral lower lobe atelectasis. 3. Small amount of nonspecific free fluid in the pelvic cavity. 4. Anasarca. 5. Cholelithiasis. Xr Chest (single View Frontal)    Result Date: 4/13/2019  EXAMINATION: SINGLE XRAY VIEW OF THE CHEST 4/13/2019 7:18 am COMPARISON: April 12, 2019 HISTORY: ORDERING SYSTEM PROVIDED HISTORY: dyspnea TECHNOLOGIST PROVIDED HISTORY: dyspnea Ordering Physician Provided Reason for Exam: dyspnea Acuity: Acute Type of Exam: Subsequent/Follow-up Additional signs and symptoms: dyspnea FINDINGS: A right IJ catheter is seen with its tip terminating at the superior cavoatrial junction. The left chest wall pacemaker and leads are stable. The cardiomediastinal silhouette is stable. There is interval increased opacity at the right lung base, may be related to atelectasis versus pneumonia. There is small atelectasis and mild pleural effusion at the left lung base. There is no pneumothorax. There is no acute osseous abnormality. Interval increased opacity at the right lung base, may be related to atelectasis versus pneumonia. Small atelectasis and mild pleural effusion at the left lung, new since the prior study. Xr Femur Left (min 2 Views)    Result Date: 3/27/2019  EXAMINATION: 4 XRAY VIEWS OF THE LEFT FEMUR; 2 XRAY VIEWS OF THE LEFT KNEE; 3 XRAY VIEWS OF THE LEFT TIBIA AND FIBULA 3/27/2019 7:00 pm COMPARISON: Left hip 11/14/2015. HISTORY: ORDERING SYSTEM PROVIDED HISTORY: pain TECHNOLOGIST PROVIDED HISTORY: pain Ordering Physician Provided Reason for Exam: pt twisted wrong, lt knee pain, unable to straighten knee. Acuity: Acute Type of Exam: Initial FINDINGS: Left femur, four views: The bones are diffusely osteopenic. No acute fracture deformity. The hip joint is maintained.   The femoral head projects within the acetabulum. No focal soft tissue abnormality is seen. Left knee, two views: The knee is flexed. No acute osseous abnormality. No joint effusion. Joint spaces are not optimally profiled but no significant arthritic changes are apparent. Left tib-fib, three views: There is evidence of a remote healed distal tibia fracture. No acute fracture or dislocation is seen. No focal soft tissue abnormality. No acute osseous abnormality of the left femur. No acute osseous abnormality of the left knee. No acute osseous abnormality of the left tib-fib. Healed remote distal tibia fracture. Marked osteopenia, likely due to disuse. Xr Knee Left (1-2 Views)    Result Date: 3/27/2019  EXAMINATION: 4 XRAY VIEWS OF THE LEFT FEMUR; 2 XRAY VIEWS OF THE LEFT KNEE; 3 XRAY VIEWS OF THE LEFT TIBIA AND FIBULA 3/27/2019 7:00 pm COMPARISON: Left hip 11/14/2015. HISTORY: ORDERING SYSTEM PROVIDED HISTORY: pain TECHNOLOGIST PROVIDED HISTORY: pain Ordering Physician Provided Reason for Exam: pt twisted wrong, lt knee pain, unable to straighten knee. Acuity: Acute Type of Exam: Initial FINDINGS: Left femur, four views: The bones are diffusely osteopenic. No acute fracture deformity. The hip joint is maintained. The femoral head projects within the acetabulum. No focal soft tissue abnormality is seen. Left knee, two views: The knee is flexed. No acute osseous abnormality. No joint effusion. Joint spaces are not optimally profiled but no significant arthritic changes are apparent. Left tib-fib, three views: There is evidence of a remote healed distal tibia fracture. No acute fracture or dislocation is seen. No focal soft tissue abnormality. No acute osseous abnormality of the left femur. No acute osseous abnormality of the left knee. No acute osseous abnormality of the left tib-fib. Healed remote distal tibia fracture. Marked osteopenia, likely due to disuse.      Xr Knee Left (3 Views)    Result Date: 3/30/2019  EXAMINATION: 3 XRAY VIEWS OF THE LEFT KNEE 3/30/2019 10:58 am COMPARISON: March 27, 2018 HISTORY: ORDERING SYSTEM PROVIDED HISTORY: F/u L knee pain after cast TECHNOLOGIST PROVIDED HISTORY: AP, oblique, lateral F/u L knee pain after cast FINDINGS: Marked osteopenia. Interval casting, which degrades fine osseous detail question cortical offset in the lateral femoral metaphysis. No malalignment identified. No significant joint effusion. Interval casting. Question nondisplaced fracture in the lateral femoral metaphysis. Osteopenia. Xr Tibia Fibula Left (2 Views)    Result Date: 3/27/2019  EXAMINATION: 4 XRAY VIEWS OF THE LEFT FEMUR; 2 XRAY VIEWS OF THE LEFT KNEE; 3 XRAY VIEWS OF THE LEFT TIBIA AND FIBULA 3/27/2019 7:00 pm COMPARISON: Left hip 11/14/2015. HISTORY: ORDERING SYSTEM PROVIDED HISTORY: pain TECHNOLOGIST PROVIDED HISTORY: pain Ordering Physician Provided Reason for Exam: pt twisted wrong, lt knee pain, unable to straighten knee. Acuity: Acute Type of Exam: Initial FINDINGS: Left femur, four views: The bones are diffusely osteopenic. No acute fracture deformity. The hip joint is maintained. The femoral head projects within the acetabulum. No focal soft tissue abnormality is seen. Left knee, two views: The knee is flexed. No acute osseous abnormality. No joint effusion. Joint spaces are not optimally profiled but no significant arthritic changes are apparent. Left tib-fib, three views: There is evidence of a remote healed distal tibia fracture. No acute fracture or dislocation is seen. No focal soft tissue abnormality. No acute osseous abnormality of the left femur. No acute osseous abnormality of the left knee. No acute osseous abnormality of the left tib-fib. Healed remote distal tibia fracture. Marked osteopenia, likely due to disuse.      Xr Abdomen (kub) (single Ap View)    Result Date: 4/14/2019  EXAMINATION: SINGLE SUPINE XRAY VIEW(S) OF THE ABDOMEN 4/14/2019 7:39 am COMPARISON: CT abdomen and pelvis  film from 11 April 2019 HISTORY: [address] [facility] [address]: Abd Distention TECHNOLOGIST PROVIDED HISTORY: Abd Distention Ordering Physician Provided Reason for Exam: Abdominal distention. Pt was moving around in the bed. Best films at present time. Acuity: Acute Type of Exam: Initial Additional signs and symptoms: Abdominal distention. Pt was moving around in the bed. Best films at present time. FINDINGS: Portable view time stamped at 748 hours demonstrates an intestinal tube terminating in the midportion of a gaseous Spike dilated stomach. Densities are present over the stomach likely medication. Bipolar pacemaker is in situ with intact leads. Heart size is top-normal, stable. Gaseous distension of the stomach and loop of bowel in the upper mid abdomen is noted but there is gas and fecal material in the rectum. Gastric outlet obstruction or proximal small bowel partial obstruction is suspected. No free air is noted. Midline city is present over the pelvis likely a monitor or CT small bore catheter. Vascular calcification is present in the pelvis. Persistent preferential gaseous distension of the stomach although there is some gas in the upper abdomen and gas and fecal material present in the rectosigmoid. Findings suggest gastric outlet obstruction. Ct Abdomen Pelvis W Iv Contrast    Result Date: 4/11/2019  EXAMINATION: CT OF THE ABDOMEN AND PELVIS WITH CONTRAST 4/11/2019 5:14 pm TECHNIQUE: CT of the abdomen and pelvis was performed with the administration of intravenous contrast. Multiplanar reformatted images are provided for review. Dose modulation, iterative reconstruction, and/or weight based adjustment of the mA/kV was utilized to reduce the radiation dose to as low as reasonably achievable. COMPARISON: None.  HISTORY: ORDERING SYSTEM PROVIDED HISTORY: Abdominal pain TECHNOLOGIST PROVIDED HISTORY: IV Only Contrast Ordering Physician Provided Reason for Exam: patient c/o abd pain for an hour FINDINGS: Lower Chest: Trace pleural fluid bilaterally is noted. Indwelling cardiac pacemaker is present. Heart size is normal.  Coronary artery calcifications are evident. The esophagus is significantly dilated and fluid-filled. No paraesophageal adenopathy is evident. Organs: The spleen, pancreas, and adrenals are unremarkable. The liver contains hypodensities, likely cysts. The gallbladder is distended and contains a solitary gallstone. The kidneys excrete contrast bilaterally. Extrarenal collecting systems are noted. The ureters are mildly dilated down to the urinary bladder without evidence of intraluminal or ureteral obstructing calculi. GI/Bowel: Marked distention of the stomach is noted; this continues to the pylorus with appearance suggesting partial gastric outlet obstruction. Fluid is present in some small bowel loops and colon distal to the stomach. No small bowel obstruction. Pelvis: Marked distention of the urinary bladder is noted. 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 Distended ureters may be related to reflux or infection. No free pelvic fluid, pelvic or inguinal adenopathy is noted. Peritoneum/Retroperitoneum: No aortic aneurysm. Mild to moderate plaque is noted in the infrarenal abdominal aorta and both proximal iliac arteries. Shotty lymph nodes are present around the aorta in the upper abdomen. No mesenteric adenopathy is noted. Bones/Soft Tissues: Degenerative changes are present in the hips and lower lumbar facets. Estimated biologic radiation dose for this procedure:258.77 mGy/cm2.     1. Dilatation of the thoracic esophagus filled with fluid. No obstructive process is noted. No adjacent enlarged lymph nodes. 2. Trace pleural fluid. 3. Marked distention of the stomach. This appears to extend to the pylorus. Partial gastric outlet obstruction is not excluded.  4. Bilateral dilated ureters without obstructing calculi. Urinary bladder is markedly distended with bladder wall air suggesting emphysematous cystitis. Dilatation of the ureters may be related to reflux or infection. 5. Atherosclerotic disease. 6. Other findings as above. Critical results were called by Dr. Jose M MD to [address] on 4/11/2019 at 17:37. Xr Chest Portable    Result Date: 4/16/2019  EXAMINATION: SINGLE XRAY VIEW OF THE CHEST 4/16/2019 6:54 am COMPARISON: 04/15/2019, 610 hours HISTORY: ORDERING SYSTEM PROVIDED HISTORY: ETT placement TECHNOLOGIST PROVIDED HISTORY: ETT placement Ordering Physician Provided Reason for Exam: on vent Acuity: Acute Type of Exam: Initial 68-year-old female on ventilator; check endotracheal tube placement FINDINGS: Portable AP upright view of the chest. Endotracheal tube distal tip overlying the mid trachea approximately 4.1 cm above the level of the ron. Enteric tube traverses the GE junction with distal tip excluded from the field of view. Left subclavian approach cardiac pacemaker device distal lead tips relatively stable in position. Right internal jugular approach central venous catheter distal tip overlying the high right atrium, stable. Cardiac monitor leads overlie the chest. Atherosclerotic calcification of the thoracic aorta. Slight stable volume loss of the left hemithorax. No pneumothorax. No free air. Dense retrocardiac/left basilar airspace consolidation and small left-sided pleural effusion. Stable mild focal opacity at the right mid lung zone. Underlying COPD. Stable mild pulmonary vascular congestion and left-sided predominant parahilar opacity. Visualized osseous structures remain unchanged. 1. Stable multifocal airspace disease as detailed above with dense retrocardiac/left basilar airspace consolidation and small left-sided pleural effusion. Mild pulmonary vascular congestion. Findings may represent edema or multifocal pneumonia. 2. Underlying COPD.  3. Tubes and line as detailed above. Xr Chest Portable    Result Date: 4/15/2019  EXAMINATION: SINGLE XRAY VIEW OF THE CHEST 4/15/2019 6:47 am COMPARISON: 14 April 2019 HISTORY: ORDERING SYSTEM PROVIDED HISTORY: ETT placement TECHNOLOGIST PROVIDED HISTORY: ETT placement Ordering Physician Provided Reason for Exam: on vent Acuity: Acute Type of Exam: Initial FINDINGS: AP portable view of the chest time stamped at 612 hours demonstrates overlying cardiac monitoring electrodes. Endotracheal tube terminates 4 cm above the ron. Bipolar pacemaker enters from the left with intact leads in appropriate positions. Intestinal tube extends beyond the fundus of the stomach, tip not included. Right internal jugular catheter terminates at the cavoatrial junction. Heart size is normal.  Aortic arch is calcified. There is interval improvement in vascular congestion with resolution of perihilar opacities. Some bibasilar opacities remain. No extrapleural air is noted. Osseous structures are stable. Interval improvement in vascular congestion and bilateral opacities consistent with resolving pulmonary edema. Tubes and lines as above. Xr Chest Portable    Result Date: 4/14/2019  EXAMINATION: SINGLE XRAY VIEW OF THE CHEST 4/14/2019 7:57 am COMPARISON: Portable chest 04/13/2019. HISTORY: ORDERING SYSTEM PROVIDED HISTORY: Intubation TECHNOLOGIST PROVIDED HISTORY: Intubation Ordering Physician Provided Reason for Exam: intubation Acuity: Acute Type of Exam: Initial Additional signs and symptoms: intubation FINDINGS: Endotracheal tube terminates over the midthoracic trachea. Dual-chamber pacemaker leads appear unchanged in position. Right IJ approach central venous catheter unchanged in position. Heart size not substantially changed. Perihilar and basilar opacities further increased. Left pleural effusion increased in size.      Findings may reflect pulmonary edema, progressed from yesterday's exam.  Left pleural

## 2019-05-21 NOTE — PROGRESS NOTES
[facility]   Progress Note                   Date:   5/21/2019  Patient name: Maryam Patterson  Date of admission:  4/11/2019  2:48 PM  MRN:   [id number]  YOB: 1950  PCP: Stephany Klein DO    Reason for Admission:  Abdominal pain     Subjective:       Patient seen and examined at bedside. Patient extubated 5/20. She is complaining of pain all over. Currently in NSR. Medications:   Scheduled Meds:   enoxaparin  30 mg Subcutaneous Daily    ipratropium-albuterol  1 ampule Inhalation Q4H    methylPREDNISolone  40 mg Intravenous Daily    furosemide  20 mg Intravenous BID    fat emulsion  100 mL Intravenous Daily    insulin lispro  0-6 Units Subcutaneous Q6H    sodium chloride flush  10 mL Intravenous 2 times per day    pantoprazole  40 mg Intravenous Daily    And    sodium chloride (PF)  10 mL Intravenous Daily    alteplase  1 mg Intracatheter Once    meropenem  1 g Intravenous Q8H    mometasone-formoterol  2 puff Inhalation BID       Continuous Infusions:   PN-Adult 2-in-1 Central Line (Standard) 65 mL/hr at 05/20/19 1802    amiodarone 0.5 mg/min (05/21/19 0228)    dextrose      lactated ringers 10 mL/hr at 05/17/19 1917       CBC:   Recent Labs     05/19/19  0619 05/20/19  0608 05/20/19  1808 05/21/19  0417   WBC 9.1 20.5*  --  20.7*   HGB 8.5* 9.9* 11.0* 10.9*    394  --  265     BMP:    Recent Labs     05/19/19  0619 05/19/19 2021 05/20/19  0608 05/21/19  0417   *  --   --  140 138   K 3.2*   < > 4.7 5.0 4.4     --   --  100 97*   CO2 31  --   --  33* 32*   BUN 14  --   --  21 27*   CREATININE <0.40*  --   --  <0.40* <0.40*   GLUCOSE 145*  --   --  90 91    < > = values in this interval not displayed. Hepatic:   No results for input(s): AST, ALT, ALB, BILITOT, ALKPHOS in the last 72 hours. Troponin: No results for input(s): TROPONINI in the last 72 hours. BNP: No results for input(s): BNP in the last 72 hours.   Lipids: No results for input(s): CHOL, HDL in the last 72 hours. Invalid input(s): LDLCALCU  INR:   Recent Labs     05/19/19  0619 05/20/19  0608 05/21/19  0417   INR 1.5 1.3 1.3       Objective:   Vitals: BP (!) 123/56   Pulse 103   Temp 97.7 °F (36.5 °C) (Oral)   Resp 22   Ht 5' (1.524 m)   Wt 97 lb 7.1 oz (44.2 kg)   SpO2 99%   BMI 19.03 kg/m²     General appearance: awake and alert   HEENT: Head: Normocephalic, no lesions, without obvious abnormality  Neck: no JVD  Lungs: clear to auscultation   Heart: regular rate and rhythm, S1, S2 normal  Abdomen: soft, non-tender; bowel sounds normal  Extremities: No LE edema    Echo 4/11/19:  Summary  Small LV cavity. Normal LV wall thickness. Mildly decreased Left ventricular systolic function. Estimated LV EF 45 %. Hypokinetic basal and mid segments. Hyperkinetic apex. Evidence of diastolic dysfunction. Mild tricuspid regurgitation. Estimated right ventricular systolic pressure  is 69UPCB. Assessment:   1. Parox Atrial Fibrillation   2. Mild LV dysfunction- Echo 4/19- EF 45%, hypokinetic base/mid, hyperkinetic apex. 3. Anemia, FOBT positive  4. Cholecystis and SBO s/p ex-lap   5. Subclinical hypothyroidism       Treatment Plan:   1. Continue IV amiodarone- will change to 0.25 mg/hr tomorrow  2. Treatment of subclinical hypothyroidism per primary  3. Off IV heparin due to bleeding, on DVT prophylaxis   4. Schedule IV Lopressor 2.5 mg q8    Justin Syed MD  [address] [facility]   [facility]     Attending Cardiologist Addendum: I have reviewed and performed the history, physical, subjective, objective, assessment, and plan with the resident/fellow and agree with the note. I performed the history and physical personally. I have made changes to the note above as needed. Change to PO when able.      Thank you for allowing me to participate in the care of this patient, please do not hesitate to call if you have any questions. Karyn Pollard DO, [facility], [address] [facility]  [email or web address]  [phone]

## 2019-05-21 NOTE — PROGRESS NOTES
[address]   OCCUPATIONAL THERAPY MISSED TREATMENT NOTE   INPATIENT   Date: 19  Patient Name: Lanie Paget       Room:   MRN: [id number]   Account #: [de-identified]    : 1950  (77 y.o.)  Gender: female   Referring Practitioner: Jhonathan Hayden MD  Diagnosis: Sepsis due to UTI             REASON FOR MISSED TREATMENT:  Patient with another ancillary department   -   19 (09:51 Needs re-eval, pt w PT, attempt another time as time permits         Leonel An OT

## 2019-05-21 NOTE — PROGRESS NOTES
General Surgery Progress Note            PATIENT NAME: Petrona Kline     TODAY'S DATE: 5/21/2019, 3:18 PM    SUBJECTIVE:    Pt  Seen and examined. OBJECTIVE:   VITALS:  BP (!) 107/52   Pulse 94   Temp 97.9 °F (36.6 °C) (Oral)   Resp 24   Ht 5' (1.524 m)   Wt 97 lb 7.1 oz (44.2 kg)   SpO2 93%   BMI 19.03 kg/m²      INTAKE/OUTPUT:      Intake/Output Summary (Last 24 hours) at 5/21/2019 1518  Last data filed at 5/21/2019 1048  Gross per 24 hour   Intake 2242.75 ml   Output 4315 ml   Net -2072.25 ml                 CONSTITUTIONAL:  awake and alert. No acute distress  HEART:   Regular   LUNGS:   Diminished   ABDOMEN:   Abdomen soft, mildly tender, non-distended. Incision intact.  MORGAN SS. +BS. +flatus  EXTREMITIES:   1+ edema    Data:  CBC with Differential:    Lab Results   Component Value Date    WBC 20.7 05/21/2019    RBC 3.80 05/21/2019    RBC 3.66 05/23/2012    HGB 10.9 05/21/2019    HCT 33.0 05/21/2019     05/21/2019     05/23/2012    MCV 86.9 05/21/2019    MCH 28.6 05/21/2019    MCHC 33.0 05/21/2019    RDW 16.1 05/21/2019    METASPCT 2 05/15/2019    LYMPHOPCT 5 05/21/2019    MONOPCT 6 05/21/2019    MYELOPCT 1 05/07/2019    BASOPCT 0 05/21/2019    MONOSABS 1.24 05/21/2019    LYMPHSABS 1.04 05/21/2019    EOSABS 0.00 05/21/2019    BASOSABS 0.00 05/21/2019    DIFFTYPE NOT REPORTED 05/21/2019     BMP:    Lab Results   Component Value Date     05/21/2019    K 4.4 05/21/2019    CL 97 05/21/2019    CO2 32 05/21/2019    BUN 27 05/21/2019    LABALBU 3.5 05/18/2019    LABALBU 4.6 05/17/2012    CREATININE <0.40 05/21/2019    CALCIUM 8.2 05/21/2019    GFRAA CANNOT BE CALCULATED 05/21/2019    LABGLOM CANNOT BE CALCULATED 05/21/2019    GLUCOSE 91 05/21/2019    GLUCOSE 87 05/21/2012         ASSESSMENT     Principal Problem:    Acute respiratory failure (HCC)  Active Problems:    Sepsis due to urinary tract infection (HCC)    Cystitis    Emphysematous cystitis    Septic shock (HCC)    Leukemoid reaction    Aspiration pneumonia of right lower lobe due to vomit (HCC)    MRSA carrier    Urinary retention    Abdominal distention    Elevated CEA    Elevated CA 19-9 level    Cholecystitis    CRP elevated    Elevated procalcitonin    Bandemia    Centrilobular emphysema (HCC)    Hemorrhage of rectum and anus    GI bleed    Anemia    Small bowel obstruction (HCC)    Anxiety disorder    Noncompliance  Resolved Problems:    * No resolved hospital problems. *  S/P right colectomy, cholecystectomy    Plan  1. Swallow study  2. If passes, will start PO intake  3.  Up as tolerated        Nuris Grijalvaching, DO [address]

## 2019-05-21 NOTE — FLOWSHEET NOTE
Patient expressed no needs at this time. Chaplains will remain available to offer spiritual and emotional support as needed.      05/21/19 1058   Encounter Summary   Services provided to: Patient   Referral/Consult From: Rounding   Continue Visiting   (5/21/19)   Complexity of Encounter Low   Length of Encounter 15 minutes   Routine   Type Follow up   Assessment Approachable   Intervention Active listening;Explored feelings, thoughts, concerns;Sustaining presence/ Ministry of presence   Outcome Expressed gratitude

## 2019-05-22 LAB
ABSOLUTE EOS #: 0.19 K/UL (ref 0–0.4)
ABSOLUTE IMMATURE GRANULOCYTE: ABNORMAL K/UL (ref 0–0.3)
ABSOLUTE LYMPH #: 1.68 K/UL (ref 1–4.8)
ABSOLUTE MONO #: 1.5 K/UL (ref 0.1–1.3)
ANION GAP SERPL CALCULATED.3IONS-SCNC: 9 MMOL/L (ref 9–17)
BASOPHILS # BLD: 0 % (ref 0–2)
BASOPHILS ABSOLUTE: 0 K/UL (ref 0–0.2)
BUN BLDV-MCNC: 33 MG/DL (ref 8–23)
BUN/CREAT BLD: ABNORMAL (ref 9–20)
CALCIUM SERPL-MCNC: 8.6 MG/DL (ref 8.6–10.4)
CHLORIDE BLD-SCNC: 95 MMOL/L (ref 98–107)
CO2: 31 MMOL/L (ref 20–31)
CREAT SERPL-MCNC: <0.4 MG/DL (ref 0.5–0.9)
CULTURE: NORMAL
CULTURE: NORMAL
DIFFERENTIAL TYPE: ABNORMAL
EOSINOPHILS RELATIVE PERCENT: 1 % (ref 0–4)
GFR AFRICAN AMERICAN: ABNORMAL ML/MIN
GFR NON-AFRICAN AMERICAN: ABNORMAL ML/MIN
GFR SERPL CREATININE-BSD FRML MDRD: ABNORMAL ML/MIN/{1.73_M2}
GFR SERPL CREATININE-BSD FRML MDRD: ABNORMAL ML/MIN/{1.73_M2}
GLUCOSE BLD-MCNC: 109 MG/DL (ref 65–105)
GLUCOSE BLD-MCNC: 111 MG/DL (ref 65–105)
GLUCOSE BLD-MCNC: 119 MG/DL (ref 65–105)
GLUCOSE BLD-MCNC: 97 MG/DL (ref 65–105)
GLUCOSE BLD-MCNC: 97 MG/DL (ref 70–99)
HCT VFR BLD CALC: 33.4 % (ref 36–46)
HCT VFR BLD CALC: 37.5 % (ref 36–46)
HEMOGLOBIN: 11 G/DL (ref 12–16)
HEMOGLOBIN: 12.2 G/DL (ref 12–16)
IMMATURE GRANULOCYTES: ABNORMAL %
INR BLD: 1.2
LYMPHOCYTES # BLD: 9 % (ref 24–44)
Lab: NORMAL
Lab: NORMAL
MAGNESIUM: 2 MG/DL (ref 1.6–2.6)
MCH RBC QN AUTO: 28.8 PG (ref 26–34)
MCHC RBC AUTO-ENTMCNC: 32.9 G/DL (ref 31–37)
MCV RBC AUTO: 87.4 FL (ref 80–100)
MONOCYTES # BLD: 8 % (ref 1–7)
MORPHOLOGY: ABNORMAL
NRBC AUTOMATED: ABNORMAL PER 100 WBC
PARTIAL THROMBOPLASTIN TIME: 31.9 SEC (ref 24–36)
PDW BLD-RTO: 16.1 % (ref 11.5–14.9)
PHOSPHORUS: 3.7 MG/DL (ref 2.6–4.5)
PLATELET # BLD: 246 K/UL (ref 150–450)
PLATELET ESTIMATE: ABNORMAL
PMV BLD AUTO: 8.6 FL (ref 6–12)
POTASSIUM SERPL-SCNC: 4.6 MMOL/L (ref 3.7–5.3)
PROTHROMBIN TIME: 15 SEC (ref 11.8–14.6)
RBC # BLD: 3.82 M/UL (ref 4–5.2)
RBC # BLD: ABNORMAL 10*6/UL
SEG NEUTROPHILS: 82 % (ref 36–66)
SEGMENTED NEUTROPHILS ABSOLUTE COUNT: 15.33 K/UL (ref 1.3–9.1)
SODIUM BLD-SCNC: 135 MMOL/L (ref 135–144)
SPECIMEN DESCRIPTION: NORMAL
SPECIMEN DESCRIPTION: NORMAL
WBC # BLD: 18.7 K/UL (ref 3.5–11)
WBC # BLD: ABNORMAL 10*3/UL

## 2019-05-22 PROCEDURE — 6370000000 HC RX 637 (ALT 250 FOR IP): Performed by: INTERNAL MEDICINE

## 2019-05-22 PROCEDURE — 2500000003 HC RX 250 WO HCPCS: Performed by: SURGERY

## 2019-05-22 PROCEDURE — 36592 COLLECT BLOOD FROM PICC: CPT

## 2019-05-22 PROCEDURE — 82947 ASSAY GLUCOSE BLOOD QUANT: CPT

## 2019-05-22 PROCEDURE — 6360000002 HC RX W HCPCS: Performed by: STUDENT IN AN ORGANIZED HEALTH CARE EDUCATION/TRAINING PROGRAM

## 2019-05-22 PROCEDURE — 94761 N-INVAS EAR/PLS OXIMETRY MLT: CPT

## 2019-05-22 PROCEDURE — 85730 THROMBOPLASTIN TIME PARTIAL: CPT

## 2019-05-22 PROCEDURE — 83735 ASSAY OF MAGNESIUM: CPT

## 2019-05-22 PROCEDURE — 2580000003 HC RX 258: Performed by: SURGERY

## 2019-05-22 PROCEDURE — 6370000000 HC RX 637 (ALT 250 FOR IP): Performed by: STUDENT IN AN ORGANIZED HEALTH CARE EDUCATION/TRAINING PROGRAM

## 2019-05-22 PROCEDURE — C9113 INJ PANTOPRAZOLE SODIUM, VIA: HCPCS | Performed by: SURGERY

## 2019-05-22 PROCEDURE — 84100 ASSAY OF PHOSPHORUS: CPT

## 2019-05-22 PROCEDURE — 2500000003 HC RX 250 WO HCPCS: Performed by: INTERNAL MEDICINE

## 2019-05-22 PROCEDURE — 85610 PROTHROMBIN TIME: CPT

## 2019-05-22 PROCEDURE — 85018 HEMOGLOBIN: CPT

## 2019-05-22 PROCEDURE — 99233 SBSQ HOSP IP/OBS HIGH 50: CPT | Performed by: INTERNAL MEDICINE

## 2019-05-22 PROCEDURE — 6360000002 HC RX W HCPCS: Performed by: SURGERY

## 2019-05-22 PROCEDURE — 6360000002 HC RX W HCPCS: Performed by: INTERNAL MEDICINE

## 2019-05-22 PROCEDURE — 94640 AIRWAY INHALATION TREATMENT: CPT

## 2019-05-22 PROCEDURE — 85014 HEMATOCRIT: CPT

## 2019-05-22 PROCEDURE — 2060000000 HC ICU INTERMEDIATE R&B

## 2019-05-22 PROCEDURE — 80048 BASIC METABOLIC PNL TOTAL CA: CPT

## 2019-05-22 PROCEDURE — 85025 COMPLETE CBC W/AUTO DIFF WBC: CPT

## 2019-05-22 PROCEDURE — 2500000003 HC RX 250 WO HCPCS: Performed by: STUDENT IN AN ORGANIZED HEALTH CARE EDUCATION/TRAINING PROGRAM

## 2019-05-22 RX ORDER — LEVOTHYROXINE SODIUM 0.05 MG/1
50 TABLET ORAL DAILY
Status: DISCONTINUED | OUTPATIENT
Start: 2019-05-22 | End: 2019-05-23

## 2019-05-22 RX ORDER — AMIODARONE HYDROCHLORIDE 200 MG/1
200 TABLET ORAL 2 TIMES DAILY
Status: DISCONTINUED | OUTPATIENT
Start: 2019-05-22 | End: 2019-05-24

## 2019-05-22 RX ORDER — METHYLPREDNISOLONE SODIUM SUCCINATE 40 MG/ML
20 INJECTION, POWDER, LYOPHILIZED, FOR SOLUTION INTRAMUSCULAR; INTRAVENOUS DAILY
Status: DISCONTINUED | OUTPATIENT
Start: 2019-05-22 | End: 2019-05-22

## 2019-05-22 RX ADMIN — LORAZEPAM 2 MG: 2 INJECTION INTRAMUSCULAR; INTRAVENOUS at 19:32

## 2019-05-22 RX ADMIN — IPRATROPIUM BROMIDE AND ALBUTEROL SULFATE 1 AMPULE: .5; 3 SOLUTION RESPIRATORY (INHALATION) at 00:00

## 2019-05-22 RX ADMIN — LORAZEPAM 2 MG: 2 INJECTION INTRAMUSCULAR; INTRAVENOUS at 04:08

## 2019-05-22 RX ADMIN — IPRATROPIUM BROMIDE AND ALBUTEROL SULFATE 1 AMPULE: .5; 3 SOLUTION RESPIRATORY (INHALATION) at 11:12

## 2019-05-22 RX ADMIN — I.V. FAT EMULSION 100 ML: 20 EMULSION INTRAVENOUS at 17:48

## 2019-05-22 RX ADMIN — CALCIUM GLUCONATE: 94 INJECTION, SOLUTION INTRAVENOUS at 17:48

## 2019-05-22 RX ADMIN — FENTANYL CITRATE 50 MCG: 50 INJECTION INTRAMUSCULAR; INTRAVENOUS at 13:49

## 2019-05-22 RX ADMIN — METOPROLOL TARTRATE 2.5 MG: 1 INJECTION, SOLUTION INTRAVENOUS at 19:32

## 2019-05-22 RX ADMIN — PANTOPRAZOLE SODIUM 40 MG: 40 INJECTION, POWDER, FOR SOLUTION INTRAVENOUS at 10:00

## 2019-05-22 RX ADMIN — Medication 10 ML: at 10:00

## 2019-05-22 RX ADMIN — IPRATROPIUM BROMIDE AND ALBUTEROL SULFATE 1 AMPULE: .5; 3 SOLUTION RESPIRATORY (INHALATION) at 07:51

## 2019-05-22 RX ADMIN — FENTANYL CITRATE 50 MCG: 50 INJECTION INTRAMUSCULAR; INTRAVENOUS at 00:09

## 2019-05-22 RX ADMIN — LORAZEPAM 2 MG: 2 INJECTION INTRAMUSCULAR; INTRAVENOUS at 13:49

## 2019-05-22 RX ADMIN — IPRATROPIUM BROMIDE AND ALBUTEROL SULFATE 1 AMPULE: .5; 3 SOLUTION RESPIRATORY (INHALATION) at 15:23

## 2019-05-22 RX ADMIN — IPRATROPIUM BROMIDE AND ALBUTEROL SULFATE 1 AMPULE: .5; 3 SOLUTION RESPIRATORY (INHALATION) at 19:47

## 2019-05-22 RX ADMIN — AMIODARONE HYDROCHLORIDE 200 MG: 200 TABLET ORAL at 19:32

## 2019-05-22 RX ADMIN — LORAZEPAM 2 MG: 2 INJECTION INTRAMUSCULAR; INTRAVENOUS at 00:10

## 2019-05-22 RX ADMIN — MEROPENEM 1 G: 1 INJECTION, POWDER, FOR SOLUTION INTRAVENOUS at 09:59

## 2019-05-22 RX ADMIN — AMIODARONE HYDROCHLORIDE 0.5 MG/MIN: 1.8 INJECTION, SOLUTION INTRAVENOUS at 03:54

## 2019-05-22 RX ADMIN — FENTANYL CITRATE 50 MCG: 50 INJECTION INTRAMUSCULAR; INTRAVENOUS at 06:09

## 2019-05-22 RX ADMIN — METOPROLOL TARTRATE 2.5 MG: 1 INJECTION, SOLUTION INTRAVENOUS at 04:09

## 2019-05-22 RX ADMIN — MEROPENEM 1 G: 1 INJECTION, POWDER, FOR SOLUTION INTRAVENOUS at 01:55

## 2019-05-22 RX ADMIN — IPRATROPIUM BROMIDE AND ALBUTEROL SULFATE 1 AMPULE: .5; 3 SOLUTION RESPIRATORY (INHALATION) at 03:58

## 2019-05-22 RX ADMIN — FENTANYL CITRATE 50 MCG: 50 INJECTION INTRAMUSCULAR; INTRAVENOUS at 09:59

## 2019-05-22 RX ADMIN — MEROPENEM 1 G: 1 INJECTION, POWDER, FOR SOLUTION INTRAVENOUS at 17:57

## 2019-05-22 RX ADMIN — FENTANYL CITRATE 50 MCG: 50 INJECTION INTRAMUSCULAR; INTRAVENOUS at 20:55

## 2019-05-22 RX ADMIN — ENOXAPARIN SODIUM 30 MG: 30 INJECTION SUBCUTANEOUS at 09:59

## 2019-05-22 RX ADMIN — Medication 10 ML: at 04:09

## 2019-05-22 RX ADMIN — FENTANYL CITRATE 50 MCG: 50 INJECTION INTRAMUSCULAR; INTRAVENOUS at 03:32

## 2019-05-22 RX ADMIN — FUROSEMIDE 20 MG: 10 INJECTION, SOLUTION INTRAMUSCULAR; INTRAVENOUS at 10:00

## 2019-05-22 RX ADMIN — LEVOTHYROXINE SODIUM 50 MCG: 50 TABLET ORAL at 09:59

## 2019-05-22 RX ADMIN — FENTANYL CITRATE 50 MCG: 50 INJECTION INTRAMUSCULAR; INTRAVENOUS at 16:23

## 2019-05-22 RX ADMIN — AMIODARONE HYDROCHLORIDE 200 MG: 200 TABLET ORAL at 13:49

## 2019-05-22 ASSESSMENT — PAIN DESCRIPTION - FREQUENCY
FREQUENCY: CONTINUOUS

## 2019-05-22 ASSESSMENT — PAIN DESCRIPTION - ONSET
ONSET: ON-GOING

## 2019-05-22 ASSESSMENT — PAIN SCALES - GENERAL
PAINLEVEL_OUTOF10: 10
PAINLEVEL_OUTOF10: 0
PAINLEVEL_OUTOF10: 10
PAINLEVEL_OUTOF10: 0
PAINLEVEL_OUTOF10: 10

## 2019-05-22 ASSESSMENT — PAIN DESCRIPTION - PAIN TYPE
TYPE: ACUTE PAIN
TYPE: ACUTE PAIN;SURGICAL PAIN

## 2019-05-22 ASSESSMENT — ENCOUNTER SYMPTOMS
ABDOMINAL PAIN: 1
SHORTNESS OF BREATH: 0
WHEEZING: 0

## 2019-05-22 ASSESSMENT — PAIN DESCRIPTION - LOCATION
LOCATION: ABDOMEN

## 2019-05-22 ASSESSMENT — PAIN DESCRIPTION - DESCRIPTORS
DESCRIPTORS: ACHING

## 2019-05-22 NOTE — PROGRESS NOTES
Infectious disease Consult Note      Patient: Sue Kehr  : 1950  Acct#:  [id number]     Date:  2019    Assessment:   Leukocytosis likely steroids related. Aspiration pneumonia . Shock resolved . Cholecystitis status post cholecystectomy tube  grew Candida non-albicans and one colony of ESBL Klebsiella on culture . finished ABXS course   SBO S/p right colectomy with ileocolic anastomosis,open cholecystectomy and excision of small bowel diverticulum 5/15 . Single positive blood culture on 5/10 STAPHYLOCOCCUS SPECIES, COAGULASE NEGATIVE likely contamination     Ecoli Emphysematous cystitis initially treatedtreated     Aspiration pneumonia of right lower lobe initially     Yeast growth on sputum culture suspect contamination     MRSA carrier    Urinary retention     Anemia GI bleed followed by GI     PCN allergy      History of CVA with left hemiparesis              Recommendations:     Continue IV meropenem  . Follow blood cultures from  negative to date . Follow CBC and renal function  . Continue supportive care . Subjective:       History of Present Illness  Patient is a 77 y.o.  female admitted with Sepsis due to urinary tract infection   who is seen in consult for the same . She presented w dysuria and lower abd pain for around a week PRIOR TO ADMISSION associated with vomiting ,was found hypotensive with leukocytosis . CT suggested emphysematous cystitis,possible gastric outlet obstruction. CXR showed a right lower infiltrate. High CA-19-9,CEA  Allergy to [facility] w SOB   Urine culture  grew ESCHERICHIA COLI resistant to Ampicillin ,sensitive to all other tested ABXS . Blood cultures negative . Mycoplasma IGM 0.97   YEAST MODERATE GROWTH on sputum culture   S/P EGD   with reported esophagitis. GB US Findings suggesting acute cholecystitis. HIIDA showed absent gallbladder filling.    She was extubated on   Status post cholecystectomy tube  by interventional radiology,  cultures on 4/22 grew Candida non-albicans and one colony of ESBL Klebsiella. PICC line was placed   Tagged RBC scan  was negative . CT abdomen 5/5 showed no fluid collection ,Bowel obstruction which is suspected to be caused by an internal hernia. NG tube was placed under fluoroscopy 5/9. She had a small bowel follow-through 5/9, developed respiratory failure with hypoxia, tachycardia and tachypnea was transferred to ICU placed on BiPAP, blood pressure on the low side required Levophed,WBC up to 28.5    code status was changed to [facility] on 5/13. S/p right colectomy with ileocolic anastomosis,open cholecystectomy and excision of small bowel diverticulum 5/15 . Interval history :  She was extubated 5/20. She remains on TPN, abdominal pain persists better, no reported fever . Past Medical History:   Diagnosis Date    Abnormal computed tomography of cervical spine     sclerotic bone appearance     CVA (cerebral vascular accident) ([address]     left  side weakness    GERD (gastroesophageal reflux disease)     Hypertension     Paraproteinemia     Weight loss       Past Surgical History:   Procedure Laterality Date    CHOLECYSTECTOMY N/A 5/15/2019    CHOLECYSTECTOMY performed by Vanessa An DO at [facility]  4/14/2019         LAPAROSCOPY N/A 5/15/2019    LAPAROSCOPY EXPLORATORY CONVERTED TO EXPLORATORY LAPAROTOMY/ RIGHT COLON RESECTION AND ANASTAMOSIS/ OPEN CHOLECYSTECTOMY/ EXTENSIVE LYSIS OF ADHESIONS performed by Vanessa An DO at [facility] 10  07/2011    Pacemaker is Medtronic Revo (compatible). Leads placed in 1995 are NOT MRI compatible. Placed at [address] V's per Dr. Jeison Galarza can not have an MRI.     UPPER GASTROINTESTINAL ENDOSCOPY N/A 4/16/2019    EGD ESOPHAGOGASTRODUODENOSCOPY @ BEDSIDE  ICU 2002 performed by Nikki Dill MD at [address]          Admission Meds  No current facility-administered medications on file prior to encounter. Current Outpatient Medications on File Prior to Encounter   Medication Sig Dispense Refill    oxyCODONE-acetaminophen (PERCOCET) 5-325 MG per tablet Take 1 tablet by mouth every 6 hours as needed for Pain.  senna-docusate (PERICOLACE) 8.6-50 MG per tablet Take 1 tablet by mouth 2 times daily      budesonide-formoterol (SYMBICORT) 160-4.5 MCG/ACT AERO Inhale 2 puffs into the lungs 2 times daily      sucralfate (CARAFATE) 1 GM/10ML suspension Take 1 g by mouth 4 times daily       traZODone (DESYREL) 50 MG tablet Take 50 mg by mouth nightly      tiZANidine (ZANAFLEX) 2 MG tablet Take 2 mg by mouth every 8 hours as needed (left knee)       aspirin 81 MG tablet Take 81 mg by mouth daily      clopidogrel (PLAVIX) 75 MG tablet TAKE 1 TABLET DAILY 30 tablet 2    DOCQLACE 100 MG capsule TAKE 1 CAPSULE BY MOUTH IN THE MORNING & IN THE EVENING -DRINK PLENTYOF WATER WHILE TAKING THIS MEDICINE 30 capsule 1    amLODIPine (NORVASC) 10 MG tablet Take 10 mg by mouth daily.  LORazepam (ATIVAN) 0.5 MG tablet Take 0.5 mg by mouth every 6 hours as needed for Anxiety.  therapeutic multivitamin-minerals (THERAGRAN-M) tablet Take 1 tablet by mouth daily.  omeprazole (PRILOSEC) 20 MG capsule Take 20 mg by mouth daily.  venlafaxine (EFFEXOR) 37.5 MG tablet Take 37.5 mg by mouth 3 times daily.  Misc. Devices [facility] [address] [place] [employer] wheelchair with left fupper extremity support  Dx: stroke with left hemiparesis. 1 each 0           Allergies  Allergies   Allergen Reactions    Penicillins Shortness Of Breath and Rash    Amoxicillin         Social   Social History     Tobacco Use    Smoking status: Current Some Day Smoker     Packs/day: 1.00     Years: 30.00     Pack years: 30.00    Smokeless tobacco: Never Used   Substance Use Topics    Alcohol use: Not Currently     Alcohol/week: 16.8 oz     Types: 28 Glasses of wine per week                History reviewed.  No pertinent family of contrast into the esophagus. No enteric contrast is seen distal to the pylorus suggesting delayed gastric emptying in the setting of ileus. 2. New moderate layering bilateral pleural effusions with bilateral lower lobe atelectasis. 3. Small amount of nonspecific free fluid in the pelvic cavity. 4. Anasarca. 5. Cholelithiasis. Xr Chest (single View Frontal)    Result Date: 4/13/2019  EXAMINATION: SINGLE XRAY VIEW OF THE CHEST 4/13/2019 7:18 am COMPARISON: April 12, 2019 HISTORY: ORDERING SYSTEM PROVIDED HISTORY: dyspnea TECHNOLOGIST PROVIDED HISTORY: dyspnea Ordering Physician Provided Reason for Exam: dyspnea Acuity: Acute Type of Exam: Subsequent/Follow-up Additional signs and symptoms: dyspnea FINDINGS: A right IJ catheter is seen with its tip terminating at the superior cavoatrial junction. The left chest wall pacemaker and leads are stable. The cardiomediastinal silhouette is stable. There is interval increased opacity at the right lung base, may be related to atelectasis versus pneumonia. There is small atelectasis and mild pleural effusion at the left lung base. There is no pneumothorax. There is no acute osseous abnormality. Interval increased opacity at the right lung base, may be related to atelectasis versus pneumonia. Small atelectasis and mild pleural effusion at the left lung, new since the prior study. Xr Femur Left (min 2 Views)    Result Date: 3/27/2019  EXAMINATION: 4 XRAY VIEWS OF THE LEFT FEMUR; 2 XRAY VIEWS OF THE LEFT KNEE; 3 XRAY VIEWS OF THE LEFT TIBIA AND FIBULA 3/27/2019 7:00 pm COMPARISON: Left hip 11/14/2015. HISTORY: ORDERING SYSTEM PROVIDED HISTORY: pain TECHNOLOGIST PROVIDED HISTORY: pain Ordering Physician Provided Reason for Exam: pt twisted wrong, lt knee pain, unable to straighten knee. Acuity: Acute Type of Exam: Initial FINDINGS: Left femur, four views: The bones are diffusely osteopenic. No acute fracture deformity. The hip joint is maintained.   The femoral head projects within the acetabulum. No focal soft tissue abnormality is seen. Left knee, two views: The knee is flexed. No acute osseous abnormality. No joint effusion. Joint spaces are not optimally profiled but no significant arthritic changes are apparent. Left tib-fib, three views: There is evidence of a remote healed distal tibia fracture. No acute fracture or dislocation is seen. No focal soft tissue abnormality. No acute osseous abnormality of the left femur. No acute osseous abnormality of the left knee. No acute osseous abnormality of the left tib-fib. Healed remote distal tibia fracture. Marked osteopenia, likely due to disuse. Xr Knee Left (1-2 Views)    Result Date: 3/27/2019  EXAMINATION: 4 XRAY VIEWS OF THE LEFT FEMUR; 2 XRAY VIEWS OF THE LEFT KNEE; 3 XRAY VIEWS OF THE LEFT TIBIA AND FIBULA 3/27/2019 7:00 pm COMPARISON: Left hip 11/14/2015. HISTORY: ORDERING SYSTEM PROVIDED HISTORY: pain TECHNOLOGIST PROVIDED HISTORY: pain Ordering Physician Provided Reason for Exam: pt twisted wrong, lt knee pain, unable to straighten knee. Acuity: Acute Type of Exam: Initial FINDINGS: Left femur, four views: The bones are diffusely osteopenic. No acute fracture deformity. The hip joint is maintained. The femoral head projects within the acetabulum. No focal soft tissue abnormality is seen. Left knee, two views: The knee is flexed. No acute osseous abnormality. No joint effusion. Joint spaces are not optimally profiled but no significant arthritic changes are apparent. Left tib-fib, three views: There is evidence of a remote healed distal tibia fracture. No acute fracture or dislocation is seen. No focal soft tissue abnormality. No acute osseous abnormality of the left femur. No acute osseous abnormality of the left knee. No acute osseous abnormality of the left tib-fib. Healed remote distal tibia fracture. Marked osteopenia, likely due to disuse.      Xr Knee Left (3 Views)    Result Date: 3/30/2019  EXAMINATION: 3 XRAY VIEWS OF THE LEFT KNEE 3/30/2019 10:58 am COMPARISON: March 27, 2018 HISTORY: ORDERING SYSTEM PROVIDED HISTORY: F/u L knee pain after cast TECHNOLOGIST PROVIDED HISTORY: AP, oblique, lateral F/u L knee pain after cast FINDINGS: Marked osteopenia. Interval casting, which degrades fine osseous detail question cortical offset in the lateral femoral metaphysis. No malalignment identified. No significant joint effusion. Interval casting. Question nondisplaced fracture in the lateral femoral metaphysis. Osteopenia. Xr Tibia Fibula Left (2 Views)    Result Date: 3/27/2019  EXAMINATION: 4 XRAY VIEWS OF THE LEFT FEMUR; 2 XRAY VIEWS OF THE LEFT KNEE; 3 XRAY VIEWS OF THE LEFT TIBIA AND FIBULA 3/27/2019 7:00 pm COMPARISON: Left hip 11/14/2015. HISTORY: ORDERING SYSTEM PROVIDED HISTORY: pain TECHNOLOGIST PROVIDED HISTORY: pain Ordering Physician Provided Reason for Exam: pt twisted wrong, lt knee pain, unable to straighten knee. Acuity: Acute Type of Exam: Initial FINDINGS: Left femur, four views: The bones are diffusely osteopenic. No acute fracture deformity. The hip joint is maintained. The femoral head projects within the acetabulum. No focal soft tissue abnormality is seen. Left knee, two views: The knee is flexed. No acute osseous abnormality. No joint effusion. Joint spaces are not optimally profiled but no significant arthritic changes are apparent. Left tib-fib, three views: There is evidence of a remote healed distal tibia fracture. No acute fracture or dislocation is seen. No focal soft tissue abnormality. No acute osseous abnormality of the left femur. No acute osseous abnormality of the left knee. No acute osseous abnormality of the left tib-fib. Healed remote distal tibia fracture. Marked osteopenia, likely due to disuse.      Xr Abdomen (kub) (single Ap View)    Result Date: 4/14/2019  EXAMINATION: SINGLE SUPINE XRAY VIEW(S) OF THE ABDOMEN 4/14/2019 7:39 am COMPARISON: CT abdomen and pelvis  film from 11 April 2019 HISTORY: [address]: Abd Distention TECHNOLOGIST PROVIDED HISTORY: Abd Distention Ordering Physician Provided Reason for Exam: Abdominal distention. Pt was moving around in the bed. Best films at present time. Acuity: Acute Type of Exam: Initial Additional signs and symptoms: Abdominal distention. Pt was moving around in the bed. Best films at present time. FINDINGS: Portable view time stamped at 748 hours demonstrates an intestinal tube terminating in the midportion of a gaseous Spike dilated stomach. Densities are present over the stomach likely medication. Bipolar pacemaker is in situ with intact leads. Heart size is top-normal, stable. Gaseous distension of the stomach and loop of bowel in the upper mid abdomen is noted but there is gas and fecal material in the rectum. Gastric outlet obstruction or proximal small bowel partial obstruction is suspected. No free air is noted. Midline city is present over the pelvis likely a monitor or CT small bore catheter. Vascular calcification is present in the pelvis. Persistent preferential gaseous distension of the stomach although there is some gas in the upper abdomen and gas and fecal material present in the rectosigmoid. Findings suggest gastric outlet obstruction. Ct Abdomen Pelvis W Iv Contrast    Result Date: 4/11/2019  EXAMINATION: CT OF THE ABDOMEN AND PELVIS WITH CONTRAST 4/11/2019 5:14 pm TECHNIQUE: CT of the abdomen and pelvis was performed with the administration of intravenous contrast. Multiplanar reformatted images are provided for review. Dose modulation, iterative reconstruction, and/or weight based adjustment of the mA/kV was utilized to reduce the radiation dose to as low as reasonably achievable. COMPARISON: None.  HISTORY: ORDERING SYSTEM PROVIDED HISTORY: Abdominal pain TECHNOLOGIST PROVIDED HISTORY: IV Only Contrast Ordering Physician Provided Reason for Exam: patient c/o abd pain for an hour FINDINGS: Lower Chest: Trace pleural fluid bilaterally is noted. Indwelling cardiac pacemaker is present. Heart size is normal.  Coronary artery calcifications are evident. The esophagus is significantly dilated and fluid-filled. No paraesophageal adenopathy is evident. Organs: The spleen, pancreas, and adrenals are unremarkable. The liver contains hypodensities, likely cysts. The gallbladder is distended and contains a solitary gallstone. The kidneys excrete contrast bilaterally. Extrarenal collecting systems are noted. The ureters are mildly dilated down to the urinary bladder without evidence of intraluminal or ureteral obstructing calculi. GI/Bowel: Marked distention of the stomach is noted; this continues to the pylorus with appearance suggesting partial gastric outlet obstruction. Fluid is present in some small bowel loops and colon distal to the stomach. No small bowel obstruction. Pelvis: Marked distention of the urinary bladder is noted. 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 Distended ureters may be related to reflux or infection. No free pelvic fluid, pelvic or inguinal adenopathy is noted. Peritoneum/Retroperitoneum: No aortic aneurysm. Mild to moderate plaque is noted in the infrarenal abdominal aorta and both proximal iliac arteries. Shotty lymph nodes are present around the aorta in the upper abdomen. No mesenteric adenopathy is noted. Bones/Soft Tissues: Degenerative changes are present in the hips and lower lumbar facets. Estimated biologic radiation dose for this procedure:258.77 mGy/cm2.     1. Dilatation of the thoracic esophagus filled with fluid. No obstructive process is noted. No adjacent enlarged lymph nodes. 2. Trace pleural fluid. 3. Marked distention of the stomach. This appears to extend to the pylorus. Partial gastric outlet obstruction is not excluded.  4. Bilateral dilated COPD. 3. Tubes and line as detailed above. Xr Chest Portable    Result Date: 4/15/2019  EXAMINATION: SINGLE XRAY VIEW OF THE CHEST 4/15/2019 6:47 am COMPARISON: 14 April 2019 HISTORY: ORDERING SYSTEM PROVIDED HISTORY: ETT placement TECHNOLOGIST PROVIDED HISTORY: ETT placement Ordering Physician Provided Reason for Exam: on vent Acuity: Acute Type of Exam: Initial FINDINGS: AP portable view of the chest time stamped at 612 hours demonstrates overlying cardiac monitoring electrodes. Endotracheal tube terminates 4 cm above the ron. Bipolar pacemaker enters from the left with intact leads in appropriate positions. Intestinal tube extends beyond the fundus of the stomach, tip not included. Right internal jugular catheter terminates at the cavoatrial junction. Heart size is normal.  Aortic arch is calcified. There is interval improvement in vascular congestion with resolution of perihilar opacities. Some bibasilar opacities remain. No extrapleural air is noted. Osseous structures are stable. Interval improvement in vascular congestion and bilateral opacities consistent with resolving pulmonary edema. Tubes and lines as above. Xr Chest Portable    Result Date: 4/14/2019  EXAMINATION: SINGLE XRAY VIEW OF THE CHEST 4/14/2019 7:57 am COMPARISON: Portable chest 04/13/2019. HISTORY: ORDERING SYSTEM PROVIDED HISTORY: Intubation TECHNOLOGIST PROVIDED HISTORY: Intubation Ordering Physician Provided Reason for Exam: intubation Acuity: Acute Type of Exam: Initial Additional signs and symptoms: intubation FINDINGS: Endotracheal tube terminates over the midthoracic trachea. Dual-chamber pacemaker leads appear unchanged in position. Right IJ approach central venous catheter unchanged in position. Heart size not substantially changed. Perihilar and basilar opacities further increased. Left pleural effusion increased in size.      Findings may reflect pulmonary edema, progressed from yesterday's exam.  Left pleural effusion increased in size. Xr Chest Portable    Result Date: 4/12/2019  EXAMINATION: SINGLE XRAY VIEW OF THE CHEST 4/12/2019 5:26 am COMPARISON: November 14, 2015. HISTORY: ORDERING SYSTEM PROVIDED HISTORY: line placement TECHNOLOGIST PROVIDED HISTORY: line placement Ordering Physician Provided Reason for Exam: New right side line placement. Acuity: Acute Type of Exam: Initial Additional signs and symptoms: New right side line placement. FINDINGS: Stable left pectoral trans venous cardiac pacer device. New right IJ central venous catheter with tip near the superior atrial caval junction. Normal lung volume. No new consolidation. Curvilinear radiopacity projecting over the right upper lobe likely represents artifact from a skin fold. No pleural effusion or pneumothorax. Stable cardiomediastinal silhouette and great vessels with redemonstration of atherosclerotic thoracic aorta. New right IJ central venous catheter with tip near the superior atrial caval junction. No pneumothorax. No new consolidation. Thank you for allowing me to participate in the care of your patient. Please feel free to contact me with any questions or concerns.      Pretty Hernandez MD

## 2019-05-22 NOTE — PLAN OF CARE
Problem: Risk for Impaired Skin Integrity  Goal: Tissue integrity - skin and mucous membranes  Description  Structural intactness and normal physiological function of skin and  mucous membranes. 5/22/2019 0308 by Zena Ambriz RN  Outcome: Ongoing  Note:   Skin assessment complete. Alternating air mattress in place. Coccyx reddened. Sensicare applied PRN. Turned and repositioned every two hours. Area kept free from moisture. Proper nourishment and fluids encouraged, as appropriate. Will continue to monitor for additional needs and changes in skin breakdown. Problem: Falls - Risk of:  Goal: Will remain free from falls  Description  Will remain free from falls  5/22/2019 0308 by Zena Ambriz RN  Outcome: Ongoing  Note:   Pt assessed as a fall risk this shift. Remains free from falls and accidental injury at this time. Fall precautions in place, including falling star sign and fall risk band on pt. Floor free from obstacles, and bed is locked and in lowest position. Adequate lighting provided. Pt encouraged to call before getting OOB for any need. Bed alarm activated. Will continue to monitor needs during hourly rounding, and reinforce education on use of call light. Problem: Infection, Septic Shock:  Goal: Will show no infection signs and symptoms  Description  Will show no infection signs and symptoms  5/22/2019 0308 by Zena Ambriz RN  Outcome: Ongoing  Note:   Patient remains afebrile; WBC count is 20.7; no signs of erythema, edema, or warmth. Will continue to monitor for signs/symptoms of infection.        Problem: Tissue Perfusion, Altered:  Goal: Circulatory function within specified parameters  Description  Circulatory function within specified parameters  5/22/2019 0308 by Zena Ambriz RN  Outcome: Ongoing  Note:   Vitals:    05/22/19 0130 05/22/19 0200 05/22/19 0230 05/22/19 0300   BP: (!) 108/58 (!) 123/57 122/70 94/73   Pulse: 93 93 91 90   Resp: 15 18 21 21   Temp:       TempSrc:

## 2019-05-22 NOTE — PROGRESS NOTES
ICU Progress Note (Non-Vent)  [facility]    Patient - Raheel Amado 54.,  Age - 77 y.o.    - 1950      Room Number -    MRN -  [id number]   Acct # - [de-identified]  Date of Admission -  2019  2:48 PM    Events of Past 24 Hours   She is sleeping comfortably but when aroused complains of abdominal pain, denies any worsening dyspnea    Vitals    height is 5' (1.524 m) and weight is 102 lb 4.7 oz (46.4 kg). Her axillary temperature is 98.5 °F (36.9 °C). Her blood pressure is 145/74 (abnormal) and her pulse is 85. Her respiration is 22 and oxygen saturation is 93%.        Temperature Range: Temp: 98.5 °F (36.9 °C) Temp  Av.2 °F (36.8 °C)  Min: 97.7 °F (36.5 °C)  Max: 98.6 °F (37 °C)  BP Range:  Systolic (50TSO), DQ , Min:93 , DHQ:660     Diastolic (62MYN), QGU:09, Min:39, Max:80    Pulse Range: Pulse  Av.8  Min: 84  Max: 108  Respiration Range: Resp  Av.1  Min: 15  Max: 29  Current Pulse Ox[de-identified]  SpO2: 93 %  24HR Pulse Ox Range:  SpO2  Av.3 %  Min: 75 %  Max: 100 %  Oxygen Amount and Delivery: O2 Flow Rate (L/min): 2 L/min    Wt Readings from Last 3 Encounters:   19 102 lb 4.7 oz (46.4 kg)   19 89 lb (40.4 kg)   19 94 lb 1.6 oz (42.7 kg)     I/O       Intake/Output Summary (Last 24 hours) at 2019 0914  Last data filed at 2019 0520  Gross per 24 hour   Intake 2434 ml   Output 3610 ml   Net -1176 ml     DRAIN/TUBE OUTPUT       Invasive Lines   ICP PRESSURE RANGE  No data recorded  CVP PRESSURE RANGE  No data recorded      Medications      methylPREDNISolone  20 mg Intravenous Daily    levothyroxine  50 mcg Per NG tube Daily    enoxaparin  30 mg Subcutaneous Daily    metoprolol  2.5 mg Intravenous Q8H    ipratropium-albuterol  1 ampule Inhalation Q4H    furosemide  20 mg Intravenous BID    fat emulsion  100 mL Intravenous Daily    insulin lispro  0-6 Units Subcutaneous Q6H    sodium chloride flush  10 mL Intravenous 2 times per day    pantoprazole  40 mg Intravenous Daily    And    sodium chloride (PF)  10 mL Intravenous Daily    alteplase  1 mg Intracatheter Once    meropenem  1 g Intravenous Q8H    mometasone-formoterol  2 puff Inhalation BID     fentanNYL, LORazepam, glucose, dextrose, glucagon (rDNA), dextrose, sodium phosphate IVPB **OR** sodium phosphate IVPB, sodium chloride flush, ondansetron, potassium chloride, magnesium sulfate  IV Drips/Infusions   PN-Adult 2-in-1 Central Line (Standard) 65 mL/hr at 05/21/19 1756    amiodarone 0.5 mg/min (05/22/19 0354)    dextrose      lactated ringers 10 mL/hr at 05/17/19 1917       Diet/Nutrition   DIET TUBE FEED CONTINUOUS/CYCLIC NPO; Semi-elemental; Nasogastric; Continuous; 10; 10; Exceptions are: Sips with Meds  PN-Adult 2-in-1 Central Line (Standard)    Exam      Constitutional - Alert, arousable  General Appearance thin and frail  HEENT -normocephalic, atraumatic. PERRLA  Lungs - Chest expands equally, no wheezes, rales or rhonchi; poor air movement  Cardiovascular - Heart sounds are normal.  normal rate and rhythm regular, no murmur, gallop or rub. Abdomen - tender, nondistended, no masses or organomegaly  Neurologic - CN II-XII are grossly intact.  There are no focal motor deficits  Skin - no bruising or bleeding  Extremities - no cyanosis, clubbing or edema    Lab Results   CBC     Lab Results   Component Value Date    WBC 18.7 05/22/2019    RBC 3.82 05/22/2019    RBC 3.66 05/23/2012    HGB 11.0 05/22/2019    HCT 33.4 05/22/2019     05/22/2019     05/23/2012    MCV 87.4 05/22/2019    MCH 28.8 05/22/2019    MCHC 32.9 05/22/2019    RDW 16.1 05/22/2019    METASPCT 2 05/15/2019    LYMPHOPCT 9 05/22/2019    MONOPCT 8 05/22/2019    MYELOPCT 1 05/07/2019    BASOPCT 0 05/22/2019    MONOSABS 1.50 05/22/2019    LYMPHSABS 1.68 05/22/2019    EOSABS 0.19 05/22/2019    BASOSABS 0.00 05/22/2019 DIFFTYPE NOT REPORTED 05/22/2019       BMP   Lab Results   Component Value Date     05/22/2019    K 4.6 05/22/2019    CL 95 05/22/2019    CO2 31 05/22/2019    BUN 33 05/22/2019    CREATININE <0.40 05/22/2019    GLUCOSE 97 05/22/2019    GLUCOSE 87 05/21/2012       LFTS  Lab Results   Component Value Date    ALKPHOS 62 05/18/2019    ALT 11 05/18/2019    AST 13 05/18/2019    PROT 5.2 05/18/2019    BILITOT 0.37 05/18/2019    BILIDIR 0.21 05/11/2019    IBILI 0.17 05/11/2019    LABALBU 3.5 05/18/2019    LABALBU 4.6 05/17/2012       ABG ABGs:   Lab Results   Component Value Date    PHART 7.487 05/20/2019    PO2ART 62.2 05/20/2019    VCF7LOF 48.9 05/20/2019       Lab Results   Component Value Date    MODE PRVC 05/20/2019         INR  Recent Labs     05/20/19  0608 05/21/19  0417 05/22/19  0451   PROTIME 16.6* 16.1* 15.0*   INR 1.3 1.3 1.2       APTT  Recent Labs     05/20/19  0608 05/21/19  0417 05/22/19  0451   APTT 75.3* 29.7 31.9       Lactic Acid  Lab Results   Component Value Date    LACTA 1.9 05/16/2019    LACTA 1.8 05/12/2019    LACTA 0.9 05/11/2019        BNP   No results for input(s): BNP in the last 72 hours. Cultures       Radiology            SYSTEMS ASSESSMENT    Acute hypoxic respiratory failure  Status post laparotomy with right colectomy and open cystectomy  A. fib with RVR new onset-in NSR now  History of treated aspiration pneumonia  Severe COPD  Bilateral pleural effusions left greater than right        Neuro   Appears to be at her baseline mental status    Respiratory   Wean oxygen as tolerated.  Keep O2 sat > 88%  follow-up chest x-ray tomorrow  Will discontinue IV Solu-Medrol is no longer bronchospastic  Aerosols to be continued  Noncooperative with incentive spirometry  Continue Dulera  Cardiovascular    remains on amiodarone drip, could this be switched to oral ?  Continue diuresis with IV Lasix      Gastrointestinal    passed her swallow study at bedside    okay to start clear liquids, advance per general surgery   Hopefully we'll be able to wean off TPN   Renal    increased BUN to creatinine ratio reflects diuresis     Infectious Disease    patient on Merrem per infectious disease     Hematology/Oncology    hemoglobin and hematocrit are stable    on DVT prophylaxis    Endocrine      blood sugars well controlled   Social/Spiritual/DNR/Disposition/Other      continue intermediate care   Critical Care Time   0 min    Electronically signed by Juan Decker MD on 5/22/2019 at 9:14 AM

## 2019-05-22 NOTE — PROGRESS NOTES
[address] [facility]    PROGRESS NOTE             5/22/2019    7:22 AM    Name:   Sukhjinder Reilly  MRN:     [id number]     Acct:      [de-identified]   Room:   2002/2002-01  IP Day:  39  Admit Date:  4/11/2019  2:48 PM    PCP:  Geneva Fu DO  Code Status:  Full Code    Subjective:     C/C:   Chief Complaint   Patient presents with    Abdominal Pain     Interval History Status: improved    Patient seen and examined. No acute events overnight. Patient does report continued abdominal pain, no exacerbations, improving since yesterday. Denies chest pain, SOB. Brief History:     S/p open ashley, ex lap, R colectomy w/ ileocolic anastomosis, excision small bowel diverticulum, extensive enterolysis. Intubated on vent, low pressor requirement, worsening edema, on solumedrol and albumin support. Extubated on 5/20. Edema improving, feeding transitioning to NGT. Review of Systems:     Review of Systems   Constitutional: Positive for appetite change (very hungry) and fatigue. Negative for fever. Respiratory: Negative for shortness of breath and wheezing. Cardiovascular: Negative for chest pain, palpitations and leg swelling. Gastrointestinal: Positive for abdominal pain. Psychiatric/Behavioral: The patient is nervous/anxious. Medications: Allergies:     Allergies   Allergen Reactions    Penicillins Shortness Of Breath and Rash    Amoxicillin        Current Meds:   Scheduled Meds:    methylPREDNISolone  20 mg Intravenous Daily    levothyroxine  50 mcg Per NG tube Daily    enoxaparin  30 mg Subcutaneous Daily    metoprolol  2.5 mg Intravenous Q8H    ipratropium-albuterol  1 ampule Inhalation Q4H    furosemide  20 mg Intravenous BID    fat emulsion  100 mL Intravenous Daily    insulin lispro  0-6 Units Subcutaneous Q6H    sodium chloride flush  10 mL Intravenous 2 times per day    pantoprazole  40 mg Intravenous Daily    And  sodium chloride (PF)  10 mL Intravenous Daily    alteplase  1 mg Intracatheter Once    meropenem  1 g Intravenous Q8H    mometasone-formoterol  2 puff Inhalation BID     Continuous Infusions:    PN-Adult 2-in-1 Central Line (Standard) 65 mL/hr at 19 1756    amiodarone 0.5 mg/min (19 0354)    dextrose      lactated ringers 10 mL/hr at 19 1917     PRN Meds: fentanNYL, LORazepam, glucose, dextrose, glucagon (rDNA), dextrose, sodium phosphate IVPB **OR** sodium phosphate IVPB, sodium chloride flush, ondansetron, potassium chloride, magnesium sulfate    Data:     Past Medical History:   has a past medical history of Abnormal computed tomography of cervical spine, CVA (cerebral vascular accident) ([address] GERD (gastroesophageal reflux disease), Hypertension, Paraproteinemia, and Weight loss. Social History:   reports that she has been smoking. She has a 30.00 pack-year smoking history. She has never used smokeless tobacco. She reports that she drank about 16.8 oz of alcohol per week. She reports that she does not use drugs. Family History: History reviewed. No pertinent family history. Vitals:  /75   Pulse 88   Temp 97.7 °F (36.5 °C) (Oral)   Resp 22   Ht 5' (1.524 m)   Wt 102 lb 4.7 oz (46.4 kg)   SpO2 91%   BMI 19.98 kg/m²   Temp (24hrs), Av.1 °F (36.7 °C), Min:97.7 °F (36.5 °C), Max:98.6 °F (37 °C)    Recent Labs     19  1132 19  1810 19  0005 19  0608   POCGLU 128* 142* 111* 97       I/O(24Hr):     Intake/Output Summary (Last 24 hours) at 2019 0722  Last data filed at 2019 0520  Gross per 24 hour   Intake 2434 ml   Output 3610 ml   Net -1176 ml       Labs:    CBC:   Lab Results   Component Value Date    WBC 18.7 2019    RBC 3.82 2019    RBC 3.66 2012    HGB 11.0 2019    HCT 33.4 2019    MCV 87.4 2019    MCH 28.8 2019    MCHC 32.9 2019    RDW 16.1 2019     2019  05/23/2012    MPV 8.6 05/22/2019     CBC with Differential:    Lab Results   Component Value Date    WBC 18.7 05/22/2019    RBC 3.82 05/22/2019    RBC 3.66 05/23/2012    HGB 11.0 05/22/2019    HCT 33.4 05/22/2019     05/22/2019     05/23/2012    MCV 87.4 05/22/2019    MCH 28.8 05/22/2019    MCHC 32.9 05/22/2019    RDW 16.1 05/22/2019    METASPCT 2 05/15/2019    LYMPHOPCT PENDING 05/22/2019    MONOPCT PENDING 05/22/2019    MYELOPCT 1 05/07/2019    BASOPCT PENDING 05/22/2019    MONOSABS PENDING 05/22/2019    LYMPHSABS PENDING 05/22/2019    EOSABS PENDING 05/22/2019    BASOSABS PENDING 05/22/2019    DIFFTYPE NOT REPORTED 05/22/2019     CMP:    Lab Results   Component Value Date     05/22/2019    K 4.6 05/22/2019    CL 95 05/22/2019    CO2 31 05/22/2019    BUN 33 05/22/2019    CREATININE <0.40 05/22/2019    GFRAA CANNOT BE CALCULATED 05/22/2019    LABGLOM CANNOT BE CALCULATED 05/22/2019    GLUCOSE 97 05/22/2019    GLUCOSE 87 05/21/2012    PROT 5.2 05/18/2019    LABALBU 3.5 05/18/2019    LABALBU 4.6 05/17/2012    CALCIUM 8.6 05/22/2019    BILITOT 0.37 05/18/2019    ALKPHOS 62 05/18/2019    AST 13 05/18/2019    ALT 11 05/18/2019     BMP:    Lab Results   Component Value Date     05/22/2019    K 4.6 05/22/2019    CL 95 05/22/2019    CO2 31 05/22/2019    BUN 33 05/22/2019    LABALBU 3.5 05/18/2019    LABALBU 4.6 05/17/2012    CREATININE <0.40 05/22/2019    CALCIUM 8.6 05/22/2019    GFRAA CANNOT BE CALCULATED 05/22/2019    LABGLOM CANNOT BE CALCULATED 05/22/2019    GLUCOSE 97 05/22/2019    GLUCOSE 87 05/21/2012     BUN/Creatinine:    Lab Results   Component Value Date    BUN 33 05/22/2019    CREATININE <0.40 05/22/2019     Hepatic Function Panel:    Lab Results   Component Value Date    ALKPHOS 62 05/18/2019    ALT 11 05/18/2019    AST 13 05/18/2019    PROT 5.2 05/18/2019    BILITOT 0.37 05/18/2019    BILIDIR 0.21 05/11/2019    IBILI 0.17 05/11/2019    LABALBU 3.5 05/18/2019    LABALBU 4.6 05/17/2012     Albumin:    Lab Results   Component Value Date    LABALBU 3.5 05/18/2019    LABALBU 4.6 05/17/2012       Lab Results   Component Value Date/Time    SPECIAL R AC 5CC PURPLE 3CC RED 05/16/2019 12:20 PM     Lab Results   Component Value Date/Time    CULTURE NO GROWTH 6 DAYS 05/16/2019 12:20 PM       Radiology:    Ct Abdomen Pelvis Wo Contrast Additional Contrast? Oral    Result Date: 4/14/2019  EXAMINATION: CT OF THE ABDOMEN AND PELVIS WITHOUT CONTRAST 4/14/2019 7:34 pm TECHNIQUE: CT of the abdomen and pelvis was performed without the administration of intravenous contrast. Multiplanar reformatted images are provided for review. Dose modulation, iterative reconstruction, and/or weight based adjustment of the mA/kV was utilized to reduce the radiation dose to as low as reasonably achievable. COMPARISON: 04/11/2019 HISTORY: ORDERING SYSTEM PROVIDED HISTORY: ABDOMINAL PAIN TECHNOLOGIST PROVIDED HISTORY: Water soluble contrast only please Ordering Physician Provided Reason for Exam: Abdominal pain - Vented patient. Contrast given via nurse through NG tube. Acuity: Unknown Type of Exam: Unknown Relevant Medical/Surgical History: Hx - Sepsis due to urinary tract infection. FINDINGS: Lower Chest: New moderate layering bilateral pleural effusions with bilateral lower lobe atelectasis. Organs: Limited evaluation due lack of intravenous contrast.  Cholelithiasis redemonstrated. No gallbladder wall thickening or biliary ductal dilatation. Scattered tiny hypodense lesions in the liver are too small to characterize but statistically represent benign cysts or hemangiomas and appear unchanged. The pancreas, spleen, adrenal glands, and kidneys are unremarkable. There is no hydronephrosis or urinary tract calculus. GI/Bowel: The stomach is distended. Enteric tube is in place. No contrast is seen distal to the pylorus and there is contrast reflux into the distal esophagus. There is no evidence of bowel obstruction. The appendix is not definitely visualized. No focal pericecal inflammatory changes are evident. Pelvis: The urinary bladder is decompressed by Tran catheter. No pelvic mass is seen. Peritoneum/Retroperitoneum: Small amount of free fluid in the pelvic cavity. No free air or focal fluid collection. No abnormal lymph node. Normal abdominal aortic caliber. Moderate calcific atherosclerosis. Bones/Soft Tissues: No acute osseous abnormality. Diffuse anasarca. Moderate degenerative changes in the lumbar spine. 1. Distended, contrast filled stomach with reflux of contrast into the esophagus. No enteric contrast is seen distal to the pylorus suggesting delayed gastric emptying in the setting of ileus. 2. New moderate layering bilateral pleural effusions with bilateral lower lobe atelectasis. 3. Small amount of nonspecific free fluid in the pelvic cavity. 4. Anasarca. 5. Cholelithiasis. Xr Chest (single View Frontal)    Result Date: 5/11/2019  EXAMINATION: ONE XRAY VIEW OF THE CHEST 5/11/2019 6:28 am COMPARISON: 10 May 2019 HISTORY: ORDERING SYSTEM PROVIDED HISTORY: Respiratory failure TECHNOLOGIST PROVIDED HISTORY: Respiratory failure Ordering Physician Provided Reason for Exam: Respiratory failure Acuity: Unknown Type of Exam: Unknown FINDINGS: AP portable view of the chest time stamped at 603 hours demonstrates findings of generalized COPD. Overlying cardiac monitoring electrodes are present. An intestinal tube extends below the fundus of the stomach, tip not included. Right central line terminates in the distal superior vena cava. Bipolar pacemaker enters from the left with intact leads in appropriate positions. Heart size is stable, top-normal.  Lung fields are hyperinflated suggestive of COPD. Interstitial markings are coarsened, similar to that noted in 2015 likely chronic interstitial change.   There is improved aeration at the left lung base with slight blunting of the left costophrenic angle suggesting effusion. There is been interval clearing of right basilar opacities likely resolved atelectasis. Osseous and mediastinal structures are age-appropriate. No vascular congestion or extrapleural air is noted. Improved aeration of both lung bases with resolved right basilar opacity. Mild left basilar opacity persists with blunting of the left costophrenic angle with small effusion on the left suspected. Findings of COPD and suspected chronic fibrotic change are noted. No extrapleural air. Tubes and lines as above. Xr Chest (single View Frontal)    Result Date: 5/2/2019  EXAMINATION: SINGLE XRAY VIEW OF THE CHEST 5/2/2019 6:34 am COMPARISON: 29 April 2019 HISTORY: ORDERING SYSTEM PROVIDED HISTORY: COPD TECHNOLOGIST PROVIDED HISTORY: COPD Ordering Physician Provided Reason for Exam: COPD Acuity: Acute Type of Exam: Initial FINDINGS: AP portable view of the chest time stamped at 630 hours demonstrates stable cardiac size. Overlying cardiac monitoring electrodes are present. Right-sided PICC line terminates in the right atrium. Bipolar pacemaker enters from the left with intact leads in appropriate positions. There is been no significant interval change in bilateral interstitial opacities, representing interval change since 2015. This may be related to worsening interstitial disease or superimposed venous congestion. Bilateral effusions are noted with bibasilar opacities, either atelectasis or edema. Continued bilateral effusions, bibasilar opacities and bilateral interstitial opacities in the upper lobes. Findings may be related to worsening interstitial disease and edema. Airspace disease is not excluded.      Xr Chest (single View Frontal)    Result Date: 4/13/2019  EXAMINATION: SINGLE XRAY VIEW OF THE CHEST 4/13/2019 7:18 am COMPARISON: April 12, 2019 HISTORY: ORDERING SYSTEM PROVIDED HISTORY: dyspnea TECHNOLOGIST PROVIDED HISTORY: dyspnea Ordering Physician Provided Reason for Exam: dyspnea Acuity: Acute Type of Exam: Subsequent/Follow-up Additional signs and symptoms: dyspnea FINDINGS: A right IJ catheter is seen with its tip terminating at the superior cavoatrial junction. The left chest wall pacemaker and leads are stable. The cardiomediastinal silhouette is stable. There is interval increased opacity at the right lung base, may be related to atelectasis versus pneumonia. There is small atelectasis and mild pleural effusion at the left lung base. There is no pneumothorax. There is no acute osseous abnormality. Interval increased opacity at the right lung base, may be related to atelectasis versus pneumonia. Small atelectasis and mild pleural effusion at the left lung, new since the prior study. Xr Chest Standard (2 Vw)    Result Date: 4/29/2019  EXAMINATION: TWO VIEWS OF THE CHEST 4/29/2019 8:04 pm COMPARISON: 04/27/2019 HISTORY: ORDERING SYSTEM PROVIDED HISTORY: Follow-up lung infiltrate TECHNOLOGIST PROVIDED HISTORY: Follow-up lung infiltrate Ordering Physician Provided Reason for Exam: Follow-up infiltrate. Pt unable to lean forward - unable to get proper sponge behind pt for lateral. Pt unable to raise left arm at all for lateral. Best possible lateral obtained. Acuity: Unknown Type of Exam: Unknown Additional signs and symptoms: Follow-up infiltrate. Pt unable to lean forward - unable to get proper sponge behind pt for lateral. Pt unable to raise left arm at all for lateral. Best possible lateral obtained. FINDINGS: Left chest cardiac pacemaker device is in place. Right IJ central venous catheter in place with distal tip at the cavoatrial junction. Heart size is within normal limits. No vascular congestion. There are small to moderate pleural effusions. There are interstitial opacities in the upper lungs, which could be related to scarring and/or developing infiltrate, slightly improved in appearance when compared to 04/27/2019. No evidence of pneumothorax.      1. Small to moderate bilateral pleural effusions, better visualized on lateral view. 2.  Upper lobe interstitial opacities, slightly improved when compared to the previous study, possibly related to underlying fibrotic change or residual infiltrate. Xr Knee Left (1-2 Views)    Result Date: 5/8/2019  EXAMINATION: 2 XRAY VIEWS OF THE LEFT KNEE 5/7/2019 11:19 am COMPARISON: Left knee radiographs 03/30/2019. HISTORY: ORDERING SYSTEM PROVIDED HISTORY: fracture TECHNOLOGIST PROVIDED HISTORY: fracture Ordering Physician Provided Reason for Exam: left knee/distal femur pain Acuity: Acute Type of Exam: Initial FINDINGS: Bones are osteopenic. No suprapatellar joint effusion. There is a impacted, transverse fracture of the distal femoral metadiaphysis. Increased sclerosis along the fracture line suggests interval healing. Fracture fragments are in unchanged, near anatomic alignment. No acute dislocation. No new fracture is seen. Impacted, transverse fracture of the distal femoral metadiaphysis is in unchanged alignment and demonstrates interval healing. Xr Abdomen (kub) (single Ap View)    Result Date: 5/10/2019  EXAMINATION: ONE SUPINE XRAY VIEW(S) OF THE ABDOMEN 5/10/2019 9:14 am COMPARISON: Small-bowel series 05/09/2019 HISTORY: ORDERING SYSTEM PROVIDED HISTORY: Small bowel obstruction ([address] TECHNOLOGIST PROVIDED HISTORY: TO ACCOMPANY SMALL BOWEL EXAM SMALL BOWEL OBSTRUCTION Ordering Physician Provided Reason for Exam: SMALL BOWEL FOLLOW THRU - 20 HOUR DELAYED FILM FINDINGS: Significant interval decreased amount of retained contrast in the stomach compared to last image from earlier small bowel series which likely relates to suctioning of the contrast.  Majority of previously seen contrast within the pelvis likely in the rectum is no longer visualized and has likely passed through. Residual contrast remains within the colon and small bowel.   NG tube is in place with side hole in the region of the GE junction. Partially visualized mild left pleural effusion associated atelectasis. 1. Interval presumed suctioning of contrast from the stomach which now appears relatively empty. Continued progression of contrast through the small and large bowel as described. 2. NG tube side hole at the level of the GE junction, consider advancement of 2-3 cm. Xr Abdomen (kub) (single Ap View)    Result Date: 5/8/2019  EXAMINATION: SINGLE SUPINE XRAY VIEW(S) OF THE ABDOMEN 5/8/2019 8:59 am COMPARISON: CT abdomen from 05/05/2019, and KUB from 04/19/2019 HISTORY: ORDERING SYSTEM PROVIDED HISTORY: recheck obstruction TECHNOLOGIST PROVIDED HISTORY: recheck obstruction Ordering Physician Provided Reason for Exam: recheck obstruction Acuity: Unknown Type of Exam: Unknown FINDINGS: Again noted cholecystostomy tube, electrode leads from a pacemaker overlying the heart, and overlying electrode leads/tubing. NG tube no longer demonstrated. Gas-filled bowel loops without marked dilatation; cecum measures 8 cm, slightly increased as compared to the previous study. No obvious free air. No mass or organomegaly. Bones unchanged. Retrocardiac opacity, hyperinflated lungs and probable pleural effusions. NG tube removed. Gas-filled bowel more suggestive ileus; no clear cut obstruction. Cecum measures 8 cm. Cholecystostomy tube in unchanged position. Additional unchanged findings, as above. RECOMMENDATION: Continued clinical correlation and follow-up     Xr Abdomen (kub) (single Ap View)    Result Date: 4/19/2019  EXAMINATION: SINGLE SUPINE XRAY VIEW(S) OF THE ABDOMEN 4/19/2019 9:48 am COMPARISON: April 14, 2019 HISTORY: ORDERING SYSTEM PROVIDED HISTORY: pain TECHNOLOGIST PROVIDED HISTORY: pain Ordering Physician Provided Reason for Exam: Abdominal pain and distentsion Acuity: Acute Type of Exam: Initial Additional signs and symptoms: Abdominal pain and distentsion FINDINGS: Enteric tube with the tip within the stomach.  Small left pleural effusion. Minimal right pleural effusion. Mildly dilated loops of bowel. Nonspecific bowel gas pattern with mildly dilated loops of bowel. Xr Abdomen (kub) (single Ap View)    Result Date: 4/14/2019  EXAMINATION: SINGLE SUPINE XRAY VIEW(S) OF THE ABDOMEN 4/14/2019 7:39 am COMPARISON: CT abdomen and pelvis  film from 11 April 2019 HISTORY: [address]: Abd Distention TECHNOLOGIST PROVIDED HISTORY: Abd Distention Ordering Physician Provided Reason for Exam: Abdominal distention. Pt was moving around in the bed. Best films at present time. Acuity: Acute Type of Exam: Initial Additional signs and symptoms: Abdominal distention. Pt was moving around in the bed. Best films at present time. FINDINGS: Portable view time stamped at 748 hours demonstrates an intestinal tube terminating in the midportion of a gaseous Spike dilated stomach. Densities are present over the stomach likely medication. Bipolar pacemaker is in situ with intact leads. Heart size is top-normal, stable. Gaseous distension of the stomach and loop of bowel in the upper mid abdomen is noted but there is gas and fecal material in the rectum. Gastric outlet obstruction or proximal small bowel partial obstruction is suspected. No free air is noted. Midline city is present over the pelvis likely a monitor or CT small bore catheter. Vascular calcification is present in the pelvis. Persistent preferential gaseous distension of the stomach although there is some gas in the upper abdomen and gas and fecal material present in the rectosigmoid. Findings suggest gastric outlet obstruction. Ct Abdomen W Contrast Additional Contrast? Radiologist Recommendation    Result Date: 5/5/2019  EXAMINATION: CT ABDOMEN WITH CONTRAST, 5/5/2019 3:38 pm TECHNIQUE: CT of the abdomen was performed with the administration of intravenous contrast. Multiplanar reformatted images are provided for review.  Dose modulation, iterative reconstruction, and/or weight based adjustment of the mA/kV was utilized to reduce the radiation dose to as low as reasonably achievable. COMPARISON: April 14, 2019 HISTORY: ORDERING SYSTEM PROVIDED HISTORY: Check for abscess/fluid collection around ashley tube site. TECHNOLOGIST PROVIDED HISTORY: Ordering Physician Provided Reason for Exam: Patient c/o abd pain around her ashley site and drainage tube. FINDINGS: Evaluation is limited by motion. Lower Chest: Moderate bilateral pleural effusions. Organs: Multiple liver hypodensities measuring up to 4 mm are incompletely characterized but in the absence of risk factors likely represent cysts requiring no specific follow-up. Cholecystostomy tube is in place. No adjacent focal drainable fluid collection. No pancreatic lesion. No splenomegaly. No adrenal lesion. No hydronephrosis. No renal lesion. GI/Bowel: Stomach is markedly distended. Swirled appearance of the mesentery is seen within the mid to lower abdomen with adjacent thickened loops of small bowel. Peritoneum/Retroperitoneum: Atherosclerotic calcification of the abdominal aorta without aneurysmal dilatation. No adenopathy. Bones/Soft Tissues: No acute soft tissue abnormality. Diffuse osteopenia. Lumbar spine degenerative changes. Bowel obstruction which is suspected to be caused by an internal hernia. Cholecystostomy tube is in place. No surrounding fluid collection. Nm Gi Blood Loss    Result Date: 5/3/2019  EXAMINATION: NUCLEAR MEDICINE GASTRIC BLEEDING STUDY 5/3/2019 TECHNIQUE: Following the intravenous injection of 23.8 mCi of 99 mTc-labeled RBC's, a flow study and standard images of the abdomen was obtained over a total period of 60 minutes COMPARISON: None.  HISTORY: ORDERING SYSTEM PROVIDED HISTORY: GI BLEEDING TECHNOLOGIST PROVIDED HISTORY: Ordering Physician Provided Reason for Exam: GI bleeding Acuity: Unknown Type of Exam: Unknown FINDINGS: Activity is seen within the blood pool, including the great vessels, heart, liver, and spleen. There was no abnormal accumulation of radioactivity in the abdomen to suggest active bleeding during study acquisition. No evidence of active GI bleeding during acquisition. RECOMMENDATIONS: If the patient shows hemodynamic signs of an active bleed in the next 20 hours, additional images can be acquired. Kate Infanteh Device Plmt/replace/removal    Result Date: 4/30/2019  PROCEDURE: ULTRASOUND GUIDED VASCULAR ACCESS. FLUOROSCOPY GUIDED PICC PLACEMENT 4/30/2019. HISTORY: ORDERING SYSTEM PROVIDED HISTORY: TPN TECHNOLOGIST PROVIDED HISTORY: TPN Lumen?->Double Lumen SEDATION: None FLUOROSCOPY DOSE AND TYPE OR TIME AND EXPOSURES: 7 seconds; D  TECHNIQUE: This procedure was performed by Dr. Milagros Tony. Informed consent was obtained after a detailed explanation of the procedure including risks, benefits, and alternatives. Universal protocol was observed. The right arm was prepped and draped in sterile fashion using maximum sterile barrier technique. Local anesthesia was achieved with lidocaine. A micropuncture needle was used to access the right basilic vein using ultrasound guidance. An ultrasound image demonstrating patency of the vein with needle tip located within it. An image was obtained and stored in PACs. A 0.018 guidewire was used to place a peel-a-way sheath and a 5 [place]ian dual-lumen PICC was advanced with fluoroscopic guidance with the tip at the cavo-atrial junction. The catheter flushed easily and there was a good blood return. The catheter was secured to the skin. The patient tolerated the procedure well and there were no immediate complications. FINDINGS: Fluoroscopic image demonstrates the tip of the catheter at the cavo-atrial junction.      Successful ultrasound and fluoroscopy guided PICC placement     Us Gallbladder Ruq    Result Date: 4/19/2019  EXAMINATION: RIGHT UPPER QUADRANT ULTRASOUND 4/19/2019 10:48 am COMPARISON: CT abdomen and pelvis 4/14/2018 HISTORY: ORDERING SYSTEM PROVIDED HISTORY: ABDOMINAL PAIN FINDINGS: Pancreas is not well visualized. No liver lesion. Gallbladder wall thickening. Gallbladder sludge. Cholelithiasis. Pericholecystic fluid. Common bile duct measures at the upper limits of normal at 7 mm. Findings suggesting acute cholecystitis. Xr Chest Portable    Result Date: 5/10/2019  EXAMINATION: ONE XRAY VIEW OF THE CHEST 5/10/2019 1:28 am COMPARISON: May 2, 2019. HISTORY: ORDERING SYSTEM PROVIDED HISTORY: low o2 sat TECHNOLOGIST PROVIDED HISTORY: low o2 sat Ordering Physician Provided Reason for Exam: Low o2 sat Acuity: Acute Type of Exam: Initial FINDINGS: Hyperexpanded right lung volume. Left greater than right basilar heterogeneous opacities with left basilar consolidation. Small bilateral pleural effusions. Stable cardiomediastinal silhouette and great vessels. Enteric contrast in the stomach and distal esophagus. Enteric tube courses into the stomach but tip is not definitely seen due to contrast in the stomach. Stable left pectoral cardiac pacer device. Stable right PICC. Left greater than right basilar heterogeneous opacities with left basilar consolidation. Differential considerations include atelectasis and an infectious/inflammatory process. Small bilateral pleural effusions. Enteric contrast in the stomach and distal esophagus. Enteric tube courses into the stomach but tip is not definitely seen due to contrast in the stomach. Xr Chest Portable    Result Date: 4/27/2019  EXAMINATION: SINGLE XRAY VIEW OF THE CHEST 4/27/2019 8:47 am COMPARISON: 04/26/2019 HISTORY: ORDERING SYSTEM PROVIDED HISTORY: Assess for pulm edema vs PNA TECHNOLOGIST PROVIDED HISTORY: Assess for pulm edema vs PNA Ordering Physician Provided Reason for Exam: cough, congestion Acuity: Acute Type of Exam: Initial FINDINGS: Right IJ central venous catheter is in place tip in the SVC right atrial junction. Pacer wires are in place. The heart and mediastinal structures are stable. Pleural effusions are present with bibasilar atelectasis. Bilateral lung infiltrates are present in the upper lung fields. Persistent pleural effusions with bibasilar atelectasis and bilateral lung infiltrates with areas of consolidation developing in the upper lobes. Xr Chest Portable    Result Date: 4/26/2019  EXAMINATION: SINGLE XRAY VIEW OF THE CHEST 4/26/2019 6:51 am COMPARISON: April 24, 2019 HISTORY: ORDERING SYSTEM PROVIDED HISTORY: Pleural effusions TECHNOLOGIST PROVIDED HISTORY: Pleural effusions Ordering Physician Provided Reason for Exam: pleural effusion Acuity: Acute Type of Exam: Initial FINDINGS: Right IJ line in good position. Pacer device is stable. Cardiac leads overlie the chest.  Stable cardiomediastinal contours with calcified aortic arch. Left effusion and associated airspace disease, slightly decreased. Chronic blunting of right costophrenic sulcus may represent scarring or chronic effusion. Increased reticular markings right upper chest and left upper chest possibly interstitial edema or interstitial pneumonia. No new airspace consolidation. Increasing reticular markings upper chest bilaterally right greater than left possibly due to edema or interstitial pneumonitis. Improving left effusion with associated retrocardiac airspace disease. Pleural-parenchymal scarring or effusion in the right lung base, stable. Xr Chest Portable    Result Date: 4/24/2019  EXAMINATION: SINGLE XRAY VIEW OF THE CHEST 4/24/2019 6:05 am COMPARISON: Chest radiograph dated 04/22/2019 HISTORY: ORDERING SYSTEM PROVIDED HISTORY: Acute respiratory failure, pleural effusion TECHNOLOGIST PROVIDED HISTORY: Acute respiratory failure, pleural effusion Ordering Physician Provided Reason for Exam: pleural effusion, respiratory failure.  Acuity: Acute Type of Exam: Initial FINDINGS: Heart size is normal.  Dual-chamber pacemaker placed via left subclavian approach. Right internal jugular central line has tip in distal superior vena cava. Interval removal of nasogastric tube. No interval change of infiltrate and atelectasis at the left lung base. Stable mild infiltrate in the left upper lobe. Mild interval improvement of infiltrate at the right lung base. Stable small bilateral pleural effusions, left larger than right. Mild CHF, stable. 1.  No interval change of infiltrate and atelectasis at the left lung base. Stable mild infiltrate in the left upper lobe. 2.  Mild interval improvement of infiltrate at the right lung base. 3.  Stable small bilateral pleural effusions, left larger than right. 4.  Mild CHF, stable. Xr Chest Portable    Result Date: 4/22/2019  EXAMINATION: SINGLE XRAY VIEW OF THE CHEST 4/22/2019 6:44 am COMPARISON: April 21, 2019. HISTORY: ORDERING SYSTEM PROVIDED HISTORY: Hypoxia and CHF TECHNOLOGIST PROVIDED HISTORY: Hypoxia and CHF Ordering Physician Provided Reason for Exam: hx of hypoxia/CHF Acuity: Acute Type of Exam: Initial FINDINGS: Right IJ central venous catheter appears in unchanged position. Nasogastric tube courses below the diaphragm, with tip not imaged. Left chest wall pacemaker device projects in unchanged position. Cardiac and mediastinal contours are enlarged but unchanged. Unchanged bilateral pleural effusions and bibasilar pulmonary opacities. Unchanged findings suggestive of congestive heart failure. No evidence of pneumothorax. No new osseous abnormalities. 1. No significant change in findings suggestive of congestive heart failure. 2. Unchanged bilateral pleural effusions and bibasilar pulmonary consolidations. RECOMMENDATION: Radiographic follow-up to complete resolution.      Xr Chest Portable    Result Date: 4/21/2019  EXAMINATION: SINGLE XRAY VIEW OF THE CHEST 4/21/2019 3:08 pm COMPARISON: April 19, 2019 HISTORY: ORDERING SYSTEM PROVIDED HISTORY: hypoxia TECHNOLOGIST PROVIDED HISTORY: hypoxia Ordering Physician Provided Reason for Exam: hypoxia Acuity: Unknown Type of Exam: Unknown FINDINGS: Enteric tube in the stomach. ET tube is been removed. Right IJ catheter terminates in the atrial caval junction. Bipolar pacer on the left unchanged. Heart and mediastinum unremarkable. Moderate edema unchanged. Small effusions, left greater than right. Bony thorax intact. Status post extubation. Life support apparatus otherwise stable. Moderate edema and effusions unchanged. Xr Chest Portable    Result Date: 4/19/2019  EXAMINATION: SINGLE XRAY VIEW OF THE CHEST 4/19/2019 5:51 am COMPARISON: April 18, 2019 HISTORY: ORDERING SYSTEM PROVIDED HISTORY: ETT placement TECHNOLOGIST PROVIDED HISTORY: ETT placement Ordering Physician Provided Reason for Exam: Vent Pt check ETT placement Acuity: Acute Type of Exam: Subsequent/Follow-up Additional signs and symptoms: Vent Pt check ETT placement FINDINGS: Tubes and lines are unchanged. Persistent bibasilar lung opacities and pleural effusions. Mild pulmonary edema. No significant interval change. Xr Chest Portable    Result Date: 4/18/2019  EXAMINATION: SINGLE XRAY VIEW OF THE CHEST 4/18/2019 6:21 am COMPARISON: April 17, 2019 HISTORY: ORDERING SYSTEM PROVIDED HISTORY: ETT placement TECHNOLOGIST PROVIDED HISTORY: ETT placement Ordering Physician Provided Reason for Exam: on vent Acuity: Acute Type of Exam: Initial FINDINGS: Right IJ line and NG tube are stable. Interval advancement of ET tube terminating 3.1 cm from ron. Implanted cardiac device is present. Left-sided effusion and associated airspace disease is stable. Hazy right basilar opacity possibly edema or atelectasis. No pneumothorax. Increased opacity left upper chest possibly focal area of atelectasis, new from prior. Advanced ETT from prior exam, now 3.1 cm from ron. Increased opacity left upper chest suspicious for atelectasis.  Additional supporting devices are cavoatrial junction. Heart size is normal.  Aortic arch is calcified. There is interval improvement in vascular congestion with resolution of perihilar opacities. Some bibasilar opacities remain. No extrapleural air is noted. Osseous structures are stable. Interval improvement in vascular congestion and bilateral opacities consistent with resolving pulmonary edema. Tubes and lines as above. Xr Chest Portable    Result Date: 4/14/2019  EXAMINATION: SINGLE XRAY VIEW OF THE CHEST 4/14/2019 7:57 am COMPARISON: Portable chest 04/13/2019. HISTORY: ORDERING SYSTEM PROVIDED HISTORY: Intubation TECHNOLOGIST PROVIDED HISTORY: Intubation Ordering Physician Provided Reason for Exam: intubation Acuity: Acute Type of Exam: Initial Additional signs and symptoms: intubation FINDINGS: Endotracheal tube terminates over the midthoracic trachea. Dual-chamber pacemaker leads appear unchanged in position. Right IJ approach central venous catheter unchanged in position. Heart size not substantially changed. Perihilar and basilar opacities further increased. Left pleural effusion increased in size. Findings may reflect pulmonary edema, progressed from yesterday's exam.  Left pleural effusion increased in size.      Vl Upper Extremity Bilateral Venous Duplex    Result Date: 4/22/2019    [facility]  Vascular Upper Extremities Veins Procedure   Patient Name   Vale Ruff     Date of Study           04/22/2019                 Lucy Carbajal   Date of Birth  1950  Gender                  Female   Age            77 year(s)  Race                       Room Number    2002        Height:                 59.84 inch, 152 cm   Corporate ID # [id number]    Weight:                 102 pounds, 46.3 kg   Patient Acct # [de-identified]   BSA:        1.4 m^2     BMI:       20.03 kg/m^2   MR #           [id number]      Sonographer             Roderick Mario   Accession #    [id number]   Interpreting Physician  Kaye Becerra Referring                  Referring Physician     Felice Singh *  Nurse  Practitioner  Procedure Type of Study:   Veins: Upper Extremities Veins, Venous Scan Upper Bilateral.  Indications for Study:Swelling. Patient Status: In Patient. Technical Quality:Limited visualization. Limitation reason:Line placements. Conclusions   Summary   No evidence of superficial or deep venous thrombosis in both upper  extremities. Signature   ----------------------------------------------------------------  Electronically signed by Roderick Mario(Sonographer) on  04/22/2019 11:46 AM  ----------------------------------------------------------------   ----------------------------------------------------------------  Electronically signed by Devang Oliveira(Interpreting  physician) on 04/22/2019 09:31 PM  ----------------------------------------------------------------  Findings:   Right Impression:                  Left Impression:  Internal jugular vein not          The internal jugular, subclavian,  visualized due to line placement. axillary, brachial, radial, and ulnar                                     veins demonstrate normal  Subclavian, axillary, brachial,    compressibility with phasic Doppler  radial, and ulnar veins            signals. demonstrate normal compressibility  with phasic Doppler signals. Normal compressibility of the cephalic                                     vein. Normal compressibility of the  cephalic vein. Normal compressibility of the basilic                                     vein. Normal compressibility of the  basilic vein. Velocities are measured in cm/s ; Diameters are measured in cm Right UE Vein Measurements 2D Measurements +------------------------------------+----------+---------------+----------+ ! Location                            ! Visualized! Compressibility! Thrombosis! +------------------------------------+----------+---------------+----------+ ! Prox SCV !Yes       !Yes            ! None      ! +------------------------------------+----------+---------------+----------+ ! Dist SCV                            ! Yes       ! Yes            ! None      ! +------------------------------------+----------+---------------+----------+ ! Prox Axillary                       ! Yes       ! Yes            ! None      ! +------------------------------------+----------+---------------+----------+ ! Dist Axillary                       ! Yes       ! Yes            ! None      ! +------------------------------------+----------+---------------+----------+ ! Prox Brachial                       !Yes       ! Yes            ! None      ! +------------------------------------+----------+---------------+----------+ ! Dist Brachial                       !Yes       ! Yes            ! None      ! +------------------------------------+----------+---------------+----------+ ! Prox Radial                         !Yes       ! Yes            ! None      ! +------------------------------------+----------+---------------+----------+ ! Dist Radial                         !Yes       ! Yes            ! None      ! +------------------------------------+----------+---------------+----------+ ! Prox Ulnar                          ! Yes       ! Yes            ! None      ! +------------------------------------+----------+---------------+----------+ ! Dist Ulnar                          ! Yes       ! Yes            ! None      ! +------------------------------------+----------+---------------+----------+ ! Basilic at UA                       ! Yes       ! Yes            ! None      ! +------------------------------------+----------+---------------+----------+ ! Basilic at AF                       ! Yes       ! Yes            ! None      ! +------------------------------------+----------+---------------+----------+ ! Yessenia at [address]                       ! Yes       ! Yes            ! None      ! +------------------------------------+----------+---------------+----------+ ! Dist SCV                            ! Yes       ! Yes            ! None      ! +------------------------------------+----------+---------------+----------+ ! Prox Axillary                       ! Yes       ! Yes            ! None      ! +------------------------------------+----------+---------------+----------+ ! Dist Axillary                       ! Yes       ! Yes            ! None      ! +------------------------------------+----------+---------------+----------+ ! Prox Brachial                       !Yes       ! Yes            ! None      ! +------------------------------------+----------+---------------+----------+ ! Dist Brachial                       !Yes       ! Yes            ! None      ! +------------------------------------+----------+---------------+----------+ ! Prox Radial                         !Yes       ! Yes            ! None      ! +------------------------------------+----------+---------------+----------+ ! Dist Radial                         !Yes       ! Yes            ! None      ! +------------------------------------+----------+---------------+----------+ ! Prox Ulnar                          ! Yes       ! Yes            ! None      ! +------------------------------------+----------+---------------+----------+ ! Dist Ulnar                          ! Yes       ! Yes            ! None      ! +------------------------------------+----------+---------------+----------+ ! Basilic at UA                       ! Yes       ! Yes            ! None      ! +------------------------------------+----------+---------------+----------+ ! Cephalic at UA                      ! Yes       ! Yes            ! None      ! +------------------------------------+----------+---------------+----------+ ! Cephalic at AF                      ! Yes       ! Yes            ! None      ! +------------------------------------+----------+---------------+----------+ Doppler Measurements +-------------------------+-----------------------+------------------------+ ! Location                 ! Signal                 !Reflux                  ! +-------------------------+-----------------------+------------------------+ ! IJV                      ! Phasic                 !                        ! +-------------------------+-----------------------+------------------------+ ! SCV                      ! Phasic                 !                        ! +-------------------------+-----------------------+------------------------+ ! Axillary                 ! Phasic                 !                        ! +-------------------------+-----------------------+------------------------+ ! Brachial                 !Phasic                 !                        ! +-------------------------+-----------------------+------------------------+    Vl Dup Lower Extremity Venous Bilateral    Result Date: 5/7/2019    [facility]  Vascular Lower Extremities DVT Study Procedure   Patient Name   LUCIA [facility]     Date of Study           05/07/2019                 Hipolito Jeffries   Date of Birth  1950  Gender                  Female   Age            77 year(s)  Race                       Room Number    2123        Height:                 59.84 inch, 152 cm   Corporate ID # [id number]    Weight:                 102 pounds, 46.3 kg   Patient Acct # [de-identified]   BSA:        1.4 m^2     BMI:      20.03 kg/m^2   MR #           [id number]      Sonographer             Apoorva Kaiser, [facility]   Accession #    [id number]   Interpreting Physician  Lilibeth Benítez   Referring                  Referring Physician     Tammi Corbett MD  Nurse  Practitioner  Procedure Type of Study:   Veins: Lower Extremities DVT Study, Venous Scan Lower Bilateral.  Indications for Study:Pain and swelling. Patient Status: In Patient. Technical Quality:Limited visualization. Limitation reason:patient movement.   Conclusions   Summary   No evidence of superficial or deep venous thrombosis in both lower  extremities. Technically limited visualization. Signature   ----------------------------------------------------------------  Electronically signed by Chrissie Bobo RVT(Sonographer) on  05/07/2019 01:22 PM  ----------------------------------------------------------------   ----------------------------------------------------------------  Electronically signed by Ariel Oliveira(Interpreting  physician) on 05/07/2019 06:45 PM  ----------------------------------------------------------------  Findings:   Right Impression:                    Left Impression:  The common femoral, femoral,         The common femoral, femoral,  popliteal and tibial veins           popliteal and tibial veins  demonstrate normal compressibility   demonstrate normal compressibility  and augmentation. and augmentation. Limited visualization of the calf    Limited visualization of the calf  veins. veins. Normal compressibility of the great  Normal compressibility of the great  saphenous vein. saphenous vein. Normal compressibility of the small  Normal compressibility of the small  saphenous vein. saphenous vein. Velocities are measured in cm/s ; Diameters are measured in cm Right Lower Extremities DVT Study Measurements Right 2D Measurements +------------------------------------+----------+---------------+----------+ ! Location                            ! Visualized! Compressibility! Thrombosis! +------------------------------------+----------+---------------+----------+ ! Common Femoral                      !Yes       ! Yes            ! None      ! +------------------------------------+----------+---------------+----------+ ! Prox Femoral                        !Yes       ! Yes            ! None      ! +------------------------------------+----------+---------------+----------+ ! Mid Femoral !Yes       !Yes            ! None      ! +------------------------------------+----------+---------------+----------+ ! Dist Femoral                        !Yes       ! Yes            ! None      ! +------------------------------------+----------+---------------+----------+ ! Deep Femoral                        !Yes       ! Yes            ! None      ! +------------------------------------+----------+---------------+----------+ ! Popliteal                           !Yes       ! Yes            ! None      ! +------------------------------------+----------+---------------+----------+ ! Sapheno Femoral Junction            ! Yes       ! Yes            ! None      ! +------------------------------------+----------+---------------+----------+ ! PTV                                 ! Partial   !Yes            ! None      ! +------------------------------------+----------+---------------+----------+ ! Peroneal                            !Partial   !Yes            ! None      ! +------------------------------------+----------+---------------+----------+ ! Gastroc                             ! Yes       ! Yes            ! None      ! +------------------------------------+----------+---------------+----------+ ! GSV Thigh                           ! Yes       ! Yes            ! None      ! +------------------------------------+----------+---------------+----------+ ! GSV Knee                            ! Yes       ! Yes            ! None      ! +------------------------------------+----------+---------------+----------+ ! GSV Ankle                           ! Yes       ! Yes            ! None      ! +------------------------------------+----------+---------------+----------+ ! SSV                                 ! Partial   !Yes            ! None      ! +------------------------------------+----------+---------------+----------+ Left Lower Extremities DVT Study Measurements Left 2D Measurements +------------------------------------+----------+---------------+----------+ ! Location                            ! Visualized! Compressibility! Thrombosis! +------------------------------------+----------+---------------+----------+ ! Common Femoral                      !Yes       ! Yes            ! None      ! +------------------------------------+----------+---------------+----------+ ! Prox Femoral                        !Yes       ! Yes            ! None      ! +------------------------------------+----------+---------------+----------+ ! Mid Femoral                         !Yes       ! Yes            ! None      ! +------------------------------------+----------+---------------+----------+ ! Dist Femoral                        !Yes       ! Yes            ! None      ! +------------------------------------+----------+---------------+----------+ ! Deep Femoral                        !Yes       ! Yes            ! None      ! +------------------------------------+----------+---------------+----------+ ! Popliteal                           !Yes       ! Yes            ! None      ! +------------------------------------+----------+---------------+----------+ ! Sapheno Femoral Junction            ! Yes       ! Yes            ! None      ! +------------------------------------+----------+---------------+----------+ ! PTV                                 ! Partial   !Yes            ! None      ! +------------------------------------+----------+---------------+----------+ ! Peroneal                            !Partial   !Yes            ! None      ! +------------------------------------+----------+---------------+----------+ ! Gastroc                             ! Yes       ! Yes            ! None      ! +------------------------------------+----------+---------------+----------+ ! GSV Thigh                           ! Yes       ! Yes            ! None      ! +------------------------------------+----------+---------------+----------+ ! GSV Knee lateral view was also obtained at 60 mins. Delayed images up to 4 hours were obtained. COMPARISON: Ultrasound 04/19/2019 HISTORY: ORDERING SYSTEM PROVIDED HISTORY: CHOLECYSTITIS TECHNOLOGIST PROVIDED HISTORY: Ordering Physician Provided Reason for Exam: Cholecystitis Acuity: Unknown Type of Exam: Unknown FINDINGS: Prompt, homogenous uptake by the liver is noted with normal appearance of radiotracer excretion into the biliary system. Clearance of bloodpool activity appears appropriate. Normal small bowel activity is seen. Prominent activity within the common bile duct. The gallbladder is not visualized by the end of the exam.     Absent filling of the gallbladder consistent with acute cholecystitis. Fl Small Bowel Follow Through Only    Result Date: 5/10/2019  EXAMINATION: SMALL BOWEL FOLLOW THROUGH SERIES 5/9/2019 TECHNIQUE: Small bowel follow through series was performed with overhead images. FLUOROSCOPY DOSE AND TYPE OR TIME AND EXPOSURES: No fluoroscopic images obtained. COMPARISON: CT abdomen pelvis 05/05/2019 HISTORY: ORDERING SYSTEM PROVIDED HISTORY: internal hernia TECHNOLOGIST PROVIDED HISTORY: Water soluble contrast only. Study to be done ONLY if NGT is placed by IR internal hernia FINDINGS:  image demonstrates NG tube overlying the left side of the abdomen within the stomach. Surgical drain overlies the right side of the abdomen. There is a nonspecific bowel gas pattern with mild dilated loops of bowel in lower abdomen. Initial images demonstrate filling of the stomach. At 1 hour and 45 minutes no significant contrast is noted in the small bowel. The 4-1/2 hour image demonstrates contrast within loops of bowel within the mid and lower abdomen and pelvis. There appears to be contrast extending to the colon in the right side of the abdomen. Contrast is noted within the pelvis which may be within the bladder or rectum.      Significant delay in emptying of the stomach with persistent contrast noted at the 6 hour concha. There is contrast extending into the bowel which appears to be in the colon. Subtle findings are limited. Consider follow-up radiograph of the abdomen in 6-8 hours. Ir Place Ng Tube By Dr Ashtyn Noonan    Result Date: 5/9/2019  PROCEDURE: XR PLACE NASOGASTRIC TUBE PHYS 5/9/2019 HISTORY: ORDERING SYSTEM PROVIDED HISTORY: ileus TECHNOLOGIST PROVIDED HISTORY: ileus Ordering Physician Provided Reason for Exam: ILEUS Acuity: Unknown Type of Exam: Unknown CONTRAST: None SEDATION: None FLUOROSCOPY DOSE AND TYPE OR TIME AND EXPOSURES: 21 seconds; D AP 46 DESCRIPTION OF PROCEDURE AND FINDINGS: This procedure was performed by Dr. Romulo Guallpa. Under intermittent fluoroscopic guidance a 12 [place] nasogastric tube was placed through the right nostril and directed under fluoroscopy into the stomach. A small amount of air was injected confirming the tip location in the stomach. Partially visualized cholecystostomy tube and pacer wires. Tube was secured in place to the patient's nose with an adhesive dressing. 12 [place] nasogastric tube placed successfully with its tip in the stomach. Physical Examination:        Physical Exam   Constitutional: No distress. Lying in bed, appears in pain   HENT:   Head: Normocephalic and atraumatic. Eyes: Pupils are equal, round, and reactive to light. Pulmonary/Chest: No respiratory distress. She has no wheezes. She has no rales. Abdominal: She exhibits no distension. There is tenderness. Stool in FMS - low volume, liquid, dark brown/green, no corrina blood    Musculoskeletal: She exhibits edema (Significantly improved upper extremity edema). Neurological: She is alert. No cranial nerve deficit. Skin: Skin is warm and dry. Capillary refill takes 2 to 3 seconds. She is not diaphoretic. Vitals reviewed.      Vitals:    05/22/19 0500 05/22/19 0504 05/22/19 0530 05/22/19 0600   BP: (!) 128/58  (!) 135/59 135/75   Pulse: 85 85 88 88   Resp: 18 18 25 22 add on       I have discussed the care of Ned Noriega , including pertinent history and exam findings,   5/22/19  with the resident. I have seen and examined the patient and the key elements of all parts of the encounter have been performed by me . I agree with the assessment, plan and orders as documented by the resident. Principal Problem:    Acute respiratory failure (HCC)  Active Problems:    Sepsis due to urinary tract infection (HCC)    Cystitis    Emphysematous cystitis    Septic shock (HCC)    Leukemoid reaction    Aspiration pneumonia of right lower lobe due to vomit (HCC)    MRSA carrier    Urinary retention    Abdominal distention    Elevated CEA    Elevated CA 19-9 level    Cholecystitis    CRP elevated    Elevated procalcitonin    Bandemia    Centrilobular emphysema (HCC)    Hemorrhage of rectum and anus    GI bleed    Anemia    Small bowel obstruction (HCC)    Anxiety disorder    Noncompliance  Resolved Problems:    * No resolved hospital problems. *         Condition       [x] high risk      [x] ill ,   [] critical        -----     Hemodynamic status  . ..... [x] stable    [] borderline -improving   [] unstable                             -----   Ventilatory  status  . Radha Lisa On   [x] oxygen Rx  ,    [] on bipap ,   [] on ventilator    extubated yesterday      NUTRITION:     [] NPO [x] Tube Feeding  [] TPN  [] PO                    Diet: DIET TUBE FEED CONTINUOUS/CYCLIC NPO; Semi-elemental; Nasogastric; Continuous; 10; 10; Exceptions are: Sips with Meds  PN-Adult 2-in-1 Central Line (Standard)    . ........... Fluid , electrolyte therapy  ;  ....... Radha Lisa Continuous Infusions:      PN-Adult 2-in-1 Central Line (Standard) 65 mL/hr at 05/21/19 1756    dextrose      lactated ringers 10 mL/hr at 05/17/19 1917   . Radha Lisa   Drug therapy ;  Scheduled Meds:      levothyroxine  50 mcg Per NG tube Daily    amiodarone  200 mg Oral BID    enoxaparin  30 mg Subcutaneous Daily    metoprolol  2.5 mg Intravenous Q8H    ipratropium-albuterol  1 ampule Inhalation Q4H    furosemide  20 mg Intravenous BID    fat emulsion  100 mL Intravenous Daily    insulin lispro  0-6 Units Subcutaneous Q6H    sodium chloride flush  10 mL Intravenous 2 times per day    pantoprazole  40 mg Intravenous Daily    And    sodium chloride (PF)  10 mL Intravenous Daily    alteplase  1 mg Intracatheter Once    meropenem  1 g Intravenous Q8H    mometasone-formoterol  2 puff Inhalation BID           ----She passed   swallowing study  - She is going to need to go for trial of clear liquids and advance as tolerated     Patient is still debilitated and and confused      Consultation  Reviewed ,  .    [] Cardiology           [] Nephrology  []. Hemo onco    [] GI    [] ID      [x] Pulmonary      [] Neuro                                  [] ---         Following input noted ; Acute hypoxic respiratory failure  Status post laparotomy with right colectomy and open cystectomy  A. fib with RVR new onset-in NSR now  History of treated aspiration pneumonia  Severe COPD  Bilateral pleural effusions left greater than right           Neuro   Appears to be at her baseline mental status     Respiratory   Wean oxygen as tolerated. Keep O2 sat > 88%  follow-up chest x-ray tomorrow  Will discontinue IV Solu-Medrol is no longer bronchospastic  Aerosols to be continued  Noncooperative with incentive spirometry  Continue Dulera  Cardiovascular    remains on amiodarone drip, could this be switched to oral ?  Continue diuresis with IV Lasix      Gastrointestinal    passed her swallow study at bedside    okay to start clear liquids, advance per general surgery   Hopefully we'll be able to wean off TPN   Renal    increased BUN to creatinine ratio reflects diuresis      Infectious Disease    patient on Merrem per elizabeth Pan    5/22/19    [facility], [address] [place] [address].    Phone [phone]

## 2019-05-22 NOTE — PROGRESS NOTES
General Surgery Progress Note            PATIENT NAME: Ned Noriega     TODAY'S DATE: 5/22/2019, 1:02 PM    SUBJECTIVE:    Pt  Seen and examined. OBJECTIVE:   VITALS:  BP (!) 116/56   Pulse 93   Temp 98.8 °F (37.1 °C) (Axillary)   Resp 21   Ht 5' (1.524 m)   Wt 102 lb 4.7 oz (46.4 kg)   SpO2 94%   BMI 19.98 kg/m²      INTAKE/OUTPUT:      Intake/Output Summary (Last 24 hours) at 5/22/2019 1302  Last data filed at 5/22/2019 1130  Gross per 24 hour   Intake 2434 ml   Output 3580 ml   Net -1146 ml                 CONSTITUTIONAL:  awake and alert. No acute distress. Appears a bit confused  HEART:   Regular   LUNGS:   Clear   ABDOMEN:   Abdomen soft, non-tender, non-distended. Incision intact.  Drain serous  EXTREMITIES:   No edema    Data:  CBC with Differential:    Lab Results   Component Value Date    WBC 18.7 05/22/2019    RBC 3.82 05/22/2019    RBC 3.66 05/23/2012    HGB 11.0 05/22/2019    HCT 33.4 05/22/2019     05/22/2019     05/23/2012    MCV 87.4 05/22/2019    MCH 28.8 05/22/2019    MCHC 32.9 05/22/2019    RDW 16.1 05/22/2019    METASPCT 2 05/15/2019    LYMPHOPCT 9 05/22/2019    MONOPCT 8 05/22/2019    MYELOPCT 1 05/07/2019    BASOPCT 0 05/22/2019    MONOSABS 1.50 05/22/2019    LYMPHSABS 1.68 05/22/2019    EOSABS 0.19 05/22/2019    BASOSABS 0.00 05/22/2019    DIFFTYPE NOT REPORTED 05/22/2019     CMP:    Lab Results   Component Value Date     05/22/2019    K 4.6 05/22/2019    CL 95 05/22/2019    CO2 31 05/22/2019    BUN 33 05/22/2019    CREATININE <0.40 05/22/2019    GFRAA CANNOT BE CALCULATED 05/22/2019    LABGLOM CANNOT BE CALCULATED 05/22/2019    GLUCOSE 97 05/22/2019    GLUCOSE 87 05/21/2012    PROT 5.2 05/18/2019    LABALBU 3.5 05/18/2019    LABALBU 4.6 05/17/2012    CALCIUM 8.6 05/22/2019    BILITOT 0.37 05/18/2019    ALKPHOS 62 05/18/2019    AST 13 05/18/2019    ALT 11 05/18/2019         ASSESSMENT     Principal Problem:    Acute respiratory failure (HCC)  Active Problems:    Sepsis due to urinary tract infection (HCC)    Cystitis    Emphysematous cystitis    Septic shock (HCC)    Leukemoid reaction    Aspiration pneumonia of right lower lobe due to vomit (HCC)    MRSA carrier    Urinary retention    Abdominal distention    Elevated CEA    Elevated CA 19-9 level    Cholecystitis    CRP elevated    Elevated procalcitonin    Bandemia    Centrilobular emphysema (HCC)    Hemorrhage of rectum and anus    GI bleed    Anemia    Small bowel obstruction (HCC)    Anxiety disorder    Noncompliance  Resolved Problems:    * No resolved hospital problems. *  S/P right colectomy, cholecystectomy    Plan  1. DC NGT  2. Clears  3. Send MORGAN fluid for bilirubin  4.  Up as tolerated        Ender Corbett, DO [address]

## 2019-05-22 NOTE — FLOWSHEET NOTE
19 220   Provider Notification   Reason for Communication Review case   Provider Name Adam Ceballos   Provider Notification Nurse Practitioner   Method of Communication Other (Comment)  (PerfectServe)   Response See orders   Notification Time [id number]     RN notified Adam Ceballos that pt's ok to draw off picc line had . RN ok'd to re-order draw off picc line for three days. See MAR/orders.

## 2019-05-22 NOTE — PROGRESS NOTES
[facility]   Progress Note                   Date:   5/22/2019  Patient name: Sukhjinder Reilly  Date of admission:  4/11/2019  2:48 PM  MRN:   [id number]  YOB: 1950  PCP: Geneva Fu DO    Reason for Admission:  Abdominal pain     Subjective:       Patient seen and examined at bedside. No new cardiac issues. Currently in NSR. Medications:   Scheduled Meds:   levothyroxine  50 mcg Per NG tube Daily    enoxaparin  30 mg Subcutaneous Daily    metoprolol  2.5 mg Intravenous Q8H    ipratropium-albuterol  1 ampule Inhalation Q4H    furosemide  20 mg Intravenous BID    fat emulsion  100 mL Intravenous Daily    insulin lispro  0-6 Units Subcutaneous Q6H    sodium chloride flush  10 mL Intravenous 2 times per day    pantoprazole  40 mg Intravenous Daily    And    sodium chloride (PF)  10 mL Intravenous Daily    alteplase  1 mg Intracatheter Once    meropenem  1 g Intravenous Q8H    mometasone-formoterol  2 puff Inhalation BID       Continuous Infusions:   PN-Adult 2-in-1 Central Line (Standard) 65 mL/hr at 05/21/19 1756    amiodarone 0.5 mg/min (05/22/19 0354)    dextrose      lactated ringers 10 mL/hr at 05/17/19 1917       CBC:   Recent Labs     05/20/19  0608  05/21/19  0417 05/21/19  1756 05/21/19 1956 05/22/19  0451   WBC 20.5*  --  20.7*  --   --  18.7*   HGB 9.9*   < > 10.9* 11.8* 11.8* 11.0*     --  265  --   --  246    < > = values in this interval not displayed. BMP:    Recent Labs     05/20/19  0608 05/21/19  0417 05/22/19  0451    138 135   K 5.0 4.4 4.6    97* 95*   CO2 33* 32* 31   BUN 21 27* 33*   CREATININE <0.40* <0.40* <0.40*   GLUCOSE 90 91 97     Hepatic:   No results for input(s): AST, ALT, ALB, BILITOT, ALKPHOS in the last 72 hours. Troponin: No results for input(s): TROPONINI in the last 72 hours. BNP: No results for input(s): BNP in the last 72 hours.   Lipids: No results for input(s): CHOL, HDL in the last 72 hours.    Invalid input(s): LDLCALCU  INR:   Recent Labs     05/20/19  0608 05/21/19  0417 05/22/19  0451   INR 1.3 1.3 1.2       Objective:   Vitals: BP (!) 145/74   Pulse 85   Temp 98.5 °F (36.9 °C) (Axillary)   Resp 22   Ht 5' (1.524 m)   Wt 102 lb 4.7 oz (46.4 kg)   SpO2 93%   BMI 19.98 kg/m²     General appearance: awake and alert   HEENT: Head: Normocephalic, no lesions, without obvious abnormality  Neck: no JVD  Lungs: clear to auscultation   Heart: regular rate and rhythm, S1, S2 normal  Abdomen: soft, non-tender; bowel sounds normal  Extremities: No LE edema    Echo 4/11/19:  Summary  Small LV cavity. Normal LV wall thickness. Mildly decreased Left ventricular systolic function. Estimated LV EF 45 %. Hypokinetic basal and mid segments. Hyperkinetic apex. Evidence of diastolic dysfunction. Mild tricuspid regurgitation. Estimated right ventricular systolic pressure  is [id number]. Assessment & Plan   1. Parox Atrial Fibrillation - currently in NSR. Change to PO amio today. 2. Mild LV dysfunction- Echo 4/19- EF 45%, hypokinetic base/mid, hyperkinetic apex. 3. Anemia, FOBT positive  4. Cholecystis and SBO s/p ex-lap   5. Subclinical hypothyroidism     Thank you for allowing me to participate in the care of this patient, please do not hesitate to call if you have any questions. Delmer Fregoso, [id number] [facility]  [facility]  [phone]

## 2019-05-22 NOTE — PROGRESS NOTES
Nutrition Assessment (Parenteral Nutrition)    Type and Reason for Visit: Reassess    Nutrition Recommendations: Clear liquid diet to start, add Ensure Clear each meal to boost intake. Following changes made in additives: NaCl- 45 to 65 mEq, KCl- 60 to 50 mEq, Mg- 10 to 9 mEq, insulin- 5 to 0 units, add MVI & trace elements. Nutrition Assessment: Pt improving from a nutritional standpoint as NG being removed and clear liquid diet ordered, add Ensure Clear each meal to provide high quality protein and essential nutrients. TPN & lipids to continue. Malnutrition Assessment:  · Malnutrition Status: At risk for malnutrition  · Context: Acute illness or injury  · Findings of the 6 clinical characteristics of malnutrition (Minimum of 2 out of 6 clinical characteristics is required to make the diagnosis of moderate or severe Protein Calorie Malnutrition based on AND/ASPEN Guidelines):  1. Energy Intake-Greater than 75% of estimated energy requirement, (Variable at times; over 75% of needs met for majority of admission)    2. Weight Loss-Unable to assess(edema present),    3. Fat Loss-Unable to assess(Pt is thin; edema present; no known recent loss of muscle or fat),    4. Muscle Loss-Unable to assess,    5. Fluid Accumulation-Moderate to severe fluid accumulation, Extremities, Generalized  6.  Strength-Not measured    Nutrition Risk Level: High    Nutrient Needs:  · Estimated Daily Total Kcal: 6730-4919 based on 35-39 kcal per kg of usual body wt  · Estimated Daily Protein (g): 63-68 based on 1.4-1.5 gm/kg IBW(revised)    Nutrition Diagnosis:   · Problem: Inadequate oral intake  · Etiology: related to Alteration in GI function, Insufficient energy/nutrient consumption     Signs and symptoms:  as evidenced by Nutrition support - PN    Objective Information:  · Nutrition-Focused Physical Findings: Edema: +1 pitting generalized; +3 pitting RUE; +4 pitting LUE; +1 pitting RLE, LLE.    · Wound Type: Deep Tissue

## 2019-05-22 NOTE — PROGRESS NOTES
Recent Labs     05/22/19  0451      K 4.6   CL 95*   CO2 31   BUN 33*   CREATININE <0.40*   GLUCOSE 97   CALCIUM 8.6   MG 2.0   PHOS 3.7       CrCl cannot be calculated (This lab value cannot be used to calculate CrCl because it is not a number: <0.40). Wt Readings from Last 1 Encounters:   05/22/19 102 lb 4.7 oz (46.4 kg)       Central line access Yes     Insulin sliding scale Yes     Lipid volume 100 ml     Annika Mame Kumar. Ph.  5/22/2019  1:10 PM

## 2019-05-23 ENCOUNTER — APPOINTMENT (OUTPATIENT)
Dept: GENERAL RADIOLOGY | Age: 69
DRG: 853 | End: 2019-05-23
Payer: COMMERCIAL

## 2019-05-23 LAB
ABSOLUTE BANDS #: 0.19 K/UL (ref 0–1)
ABSOLUTE EOS #: 0.19 K/UL (ref 0–0.4)
ABSOLUTE IMMATURE GRANULOCYTE: ABNORMAL K/UL (ref 0–0.3)
ABSOLUTE LYMPH #: 2.27 K/UL (ref 1–4.8)
ABSOLUTE MONO #: 1.51 K/UL (ref 0.1–1.3)
ANION GAP SERPL CALCULATED.3IONS-SCNC: 8 MMOL/L (ref 9–17)
BANDS: 1 % (ref 0–10)
BASOPHILS # BLD: 0 % (ref 0–2)
BASOPHILS ABSOLUTE: 0 K/UL (ref 0–0.2)
BUN BLDV-MCNC: 38 MG/DL (ref 8–23)
BUN/CREAT BLD: ABNORMAL (ref 9–20)
CALCIUM SERPL-MCNC: 8 MG/DL (ref 8.6–10.4)
CHLORIDE BLD-SCNC: 98 MMOL/L (ref 98–107)
CO2: 27 MMOL/L (ref 20–31)
CREAT SERPL-MCNC: <0.4 MG/DL (ref 0.5–0.9)
DIFFERENTIAL TYPE: ABNORMAL
EOSINOPHILS RELATIVE PERCENT: 1 % (ref 0–4)
GFR AFRICAN AMERICAN: ABNORMAL ML/MIN
GFR NON-AFRICAN AMERICAN: ABNORMAL ML/MIN
GFR SERPL CREATININE-BSD FRML MDRD: ABNORMAL ML/MIN/{1.73_M2}
GFR SERPL CREATININE-BSD FRML MDRD: ABNORMAL ML/MIN/{1.73_M2}
GLUCOSE BLD-MCNC: 100 MG/DL (ref 70–99)
GLUCOSE BLD-MCNC: 103 MG/DL (ref 65–105)
GLUCOSE BLD-MCNC: 111 MG/DL (ref 65–105)
GLUCOSE BLD-MCNC: 88 MG/DL (ref 65–105)
GLUCOSE BLD-MCNC: 98 MG/DL (ref 65–105)
HCT VFR BLD CALC: 34.5 % (ref 36–46)
HEMOGLOBIN: 11.3 G/DL (ref 12–16)
IMMATURE GRANULOCYTES: ABNORMAL %
LYMPHOCYTES # BLD: 12 % (ref 24–44)
MCH RBC QN AUTO: 28.8 PG (ref 26–34)
MCHC RBC AUTO-ENTMCNC: 32.8 G/DL (ref 31–37)
MCV RBC AUTO: 88 FL (ref 80–100)
MONOCYTES # BLD: 8 % (ref 1–7)
MORPHOLOGY: ABNORMAL
NRBC AUTOMATED: ABNORMAL PER 100 WBC
PDW BLD-RTO: 16.7 % (ref 11.5–14.9)
PLATELET # BLD: 235 K/UL (ref 150–450)
PLATELET ESTIMATE: ABNORMAL
PMV BLD AUTO: 8.7 FL (ref 6–12)
POTASSIUM SERPL-SCNC: 4.7 MMOL/L (ref 3.7–5.3)
RBC # BLD: 3.92 M/UL (ref 4–5.2)
RBC # BLD: ABNORMAL 10*6/UL
SEG NEUTROPHILS: 78 % (ref 36–66)
SEGMENTED NEUTROPHILS ABSOLUTE COUNT: 14.74 K/UL (ref 1.3–9.1)
SODIUM BLD-SCNC: 133 MMOL/L (ref 135–144)
WBC # BLD: 18.9 K/UL (ref 3.5–11)
WBC # BLD: ABNORMAL 10*3/UL

## 2019-05-23 PROCEDURE — 6360000002 HC RX W HCPCS: Performed by: INTERNAL MEDICINE

## 2019-05-23 PROCEDURE — 2060000000 HC ICU INTERMEDIATE R&B

## 2019-05-23 PROCEDURE — 2500000003 HC RX 250 WO HCPCS: Performed by: STUDENT IN AN ORGANIZED HEALTH CARE EDUCATION/TRAINING PROGRAM

## 2019-05-23 PROCEDURE — 6370000000 HC RX 637 (ALT 250 FOR IP): Performed by: INTERNAL MEDICINE

## 2019-05-23 PROCEDURE — 99233 SBSQ HOSP IP/OBS HIGH 50: CPT | Performed by: INTERNAL MEDICINE

## 2019-05-23 PROCEDURE — 94761 N-INVAS EAR/PLS OXIMETRY MLT: CPT

## 2019-05-23 PROCEDURE — 6360000002 HC RX W HCPCS: Performed by: SURGERY

## 2019-05-23 PROCEDURE — 71045 X-RAY EXAM CHEST 1 VIEW: CPT

## 2019-05-23 PROCEDURE — 85025 COMPLETE CBC W/AUTO DIFF WBC: CPT

## 2019-05-23 PROCEDURE — 6370000000 HC RX 637 (ALT 250 FOR IP): Performed by: SURGERY

## 2019-05-23 PROCEDURE — 82247 BILIRUBIN TOTAL: CPT

## 2019-05-23 PROCEDURE — 94640 AIRWAY INHALATION TREATMENT: CPT

## 2019-05-23 PROCEDURE — 2500000003 HC RX 250 WO HCPCS: Performed by: SURGERY

## 2019-05-23 PROCEDURE — 2580000003 HC RX 258: Performed by: SURGERY

## 2019-05-23 PROCEDURE — 6360000002 HC RX W HCPCS: Performed by: STUDENT IN AN ORGANIZED HEALTH CARE EDUCATION/TRAINING PROGRAM

## 2019-05-23 PROCEDURE — 80048 BASIC METABOLIC PNL TOTAL CA: CPT

## 2019-05-23 PROCEDURE — C9113 INJ PANTOPRAZOLE SODIUM, VIA: HCPCS | Performed by: SURGERY

## 2019-05-23 PROCEDURE — 82947 ASSAY GLUCOSE BLOOD QUANT: CPT

## 2019-05-23 PROCEDURE — 6370000000 HC RX 637 (ALT 250 FOR IP): Performed by: STUDENT IN AN ORGANIZED HEALTH CARE EDUCATION/TRAINING PROGRAM

## 2019-05-23 RX ORDER — IPRATROPIUM BROMIDE AND ALBUTEROL SULFATE 2.5; .5 MG/3ML; MG/3ML
1 SOLUTION RESPIRATORY (INHALATION) 4 TIMES DAILY
Status: DISCONTINUED | OUTPATIENT
Start: 2019-05-23 | End: 2019-05-31 | Stop reason: HOSPADM

## 2019-05-23 RX ORDER — LEVOTHYROXINE SODIUM 0.05 MG/1
50 TABLET ORAL DAILY
Status: DISCONTINUED | OUTPATIENT
Start: 2019-05-24 | End: 2019-05-27

## 2019-05-23 RX ORDER — MIRTAZAPINE 15 MG/1
7.5 TABLET, ORALLY DISINTEGRATING ORAL NIGHTLY
Status: DISCONTINUED | OUTPATIENT
Start: 2019-05-23 | End: 2019-05-31 | Stop reason: HOSPADM

## 2019-05-23 RX ADMIN — FENTANYL CITRATE 50 MCG: 50 INJECTION INTRAMUSCULAR; INTRAVENOUS at 16:24

## 2019-05-23 RX ADMIN — IPRATROPIUM BROMIDE AND ALBUTEROL SULFATE 1 AMPULE: .5; 3 SOLUTION RESPIRATORY (INHALATION) at 20:01

## 2019-05-23 RX ADMIN — FENTANYL CITRATE 50 MCG: 50 INJECTION INTRAMUSCULAR; INTRAVENOUS at 08:13

## 2019-05-23 RX ADMIN — IPRATROPIUM BROMIDE AND ALBUTEROL SULFATE 1 AMPULE: .5; 3 SOLUTION RESPIRATORY (INHALATION) at 04:44

## 2019-05-23 RX ADMIN — PANTOPRAZOLE SODIUM 40 MG: 40 INJECTION, POWDER, FOR SOLUTION INTRAVENOUS at 08:12

## 2019-05-23 RX ADMIN — IPRATROPIUM BROMIDE AND ALBUTEROL SULFATE 1 AMPULE: .5; 3 SOLUTION RESPIRATORY (INHALATION) at 07:39

## 2019-05-23 RX ADMIN — POTASSIUM CHLORIDE: 2 INJECTION, SOLUTION, CONCENTRATE INTRAVENOUS at 17:50

## 2019-05-23 RX ADMIN — IPRATROPIUM BROMIDE AND ALBUTEROL SULFATE 1 AMPULE: .5; 3 SOLUTION RESPIRATORY (INHALATION) at 11:15

## 2019-05-23 RX ADMIN — METOPROLOL TARTRATE 2.5 MG: 1 INJECTION, SOLUTION INTRAVENOUS at 04:38

## 2019-05-23 RX ADMIN — FENTANYL CITRATE 50 MCG: 50 INJECTION INTRAMUSCULAR; INTRAVENOUS at 21:55

## 2019-05-23 RX ADMIN — Medication 10 ML: at 08:12

## 2019-05-23 RX ADMIN — FENTANYL CITRATE 50 MCG: 50 INJECTION INTRAMUSCULAR; INTRAVENOUS at 00:08

## 2019-05-23 RX ADMIN — IPRATROPIUM BROMIDE AND ALBUTEROL SULFATE 1 AMPULE: .5; 3 SOLUTION RESPIRATORY (INHALATION) at 00:35

## 2019-05-23 RX ADMIN — LORAZEPAM 2 MG: 2 INJECTION INTRAMUSCULAR; INTRAVENOUS at 03:00

## 2019-05-23 RX ADMIN — FENTANYL CITRATE 50 MCG: 50 INJECTION INTRAMUSCULAR; INTRAVENOUS at 05:43

## 2019-05-23 RX ADMIN — MIRTAZAPINE 7.5 MG: 15 TABLET, ORALLY DISINTEGRATING ORAL at 21:52

## 2019-05-23 RX ADMIN — AMIODARONE HYDROCHLORIDE 200 MG: 200 TABLET ORAL at 08:12

## 2019-05-23 RX ADMIN — I.V. FAT EMULSION 100 ML: 20 EMULSION INTRAVENOUS at 17:50

## 2019-05-23 RX ADMIN — MEROPENEM 1 G: 1 INJECTION, POWDER, FOR SOLUTION INTRAVENOUS at 11:21

## 2019-05-23 RX ADMIN — AMIODARONE HYDROCHLORIDE 200 MG: 200 TABLET ORAL at 21:45

## 2019-05-23 RX ADMIN — Medication 10 ML: at 16:25

## 2019-05-23 RX ADMIN — Medication 2 PUFF: at 21:44

## 2019-05-23 RX ADMIN — Medication 10 ML: at 08:13

## 2019-05-23 RX ADMIN — MEROPENEM 1 G: 1 INJECTION, POWDER, FOR SOLUTION INTRAVENOUS at 03:00

## 2019-05-23 RX ADMIN — ENOXAPARIN SODIUM 30 MG: 30 INJECTION SUBCUTANEOUS at 08:13

## 2019-05-23 RX ADMIN — LORAZEPAM 2 MG: 2 INJECTION INTRAMUSCULAR; INTRAVENOUS at 18:23

## 2019-05-23 ASSESSMENT — PAIN SCALES - GENERAL
PAINLEVEL_OUTOF10: 8
PAINLEVEL_OUTOF10: 10
PAINLEVEL_OUTOF10: 5
PAINLEVEL_OUTOF10: 3
PAINLEVEL_OUTOF10: 10

## 2019-05-23 ASSESSMENT — PAIN DESCRIPTION - PROGRESSION
CLINICAL_PROGRESSION: NOT CHANGED
CLINICAL_PROGRESSION: NOT CHANGED

## 2019-05-23 ASSESSMENT — PAIN DESCRIPTION - FREQUENCY
FREQUENCY: CONTINUOUS
FREQUENCY: CONTINUOUS

## 2019-05-23 ASSESSMENT — PAIN DESCRIPTION - LOCATION
LOCATION: ABDOMEN
LOCATION: ABDOMEN

## 2019-05-23 ASSESSMENT — ENCOUNTER SYMPTOMS
ABDOMINAL PAIN: 1
SHORTNESS OF BREATH: 0
WHEEZING: 0

## 2019-05-23 ASSESSMENT — PAIN DESCRIPTION - PAIN TYPE
TYPE: ACUTE PAIN;SURGICAL PAIN
TYPE: ACUTE PAIN;SURGICAL PAIN

## 2019-05-23 ASSESSMENT — PAIN DESCRIPTION - ONSET
ONSET: ON-GOING
ONSET: ON-GOING

## 2019-05-23 ASSESSMENT — PAIN DESCRIPTION - DESCRIPTORS
DESCRIPTORS: ACHING
DESCRIPTORS: ACHING

## 2019-05-23 NOTE — PROGRESS NOTES
[address] [facility]    PROGRESS NOTE             5/23/2019    7:40 AM    Name:   Libby Issa  MRN:     [id number]     Acct:      [de-identified]   Room:   2002/2002-01  IP Day:  43  Admit Date:  4/11/2019  2:48 PM    PCP:  Barbara Holgiun DO  Code Status:  Full Code    Subjective:     C/C:   Chief Complaint   Patient presents with    Abdominal Pain     Interval History Status: improved    Patient seen and examined at bedside. Denies acute events overnight. Reports diffuse abdominal pain, similar to yesterday, slightly improved. Reports tolerating PO liquids, denies emesis. Brief History:     S/p open ashley, ex lap, R colectomy w/ ileocolic anastomosis, excision small bowel diverticulum, extensive enterolysis. Intubated on vent, low pressor requirement, worsening edema, on solumedrol and albumin support. Extubated on 5/20. Edema improving, feeding transitioning to NGT. Review of Systems:     Review of Systems   Constitutional: Positive for appetite change (very hungry) and fatigue. Negative for fever. HENT: Positive for mouth sores. Respiratory: Negative for shortness of breath and wheezing. Cardiovascular: Negative for chest pain, palpitations and leg swelling. Gastrointestinal: Positive for abdominal pain. Psychiatric/Behavioral: The patient is nervous/anxious. Medications: Allergies:     Allergies   Allergen Reactions    Penicillins Shortness Of Breath and Rash    Amoxicillin        Current Meds:   Scheduled Meds:    [START ON 5/24/2019] levothyroxine  50 mcg Oral Daily    amiodarone  200 mg Oral BID    enoxaparin  30 mg Subcutaneous Daily    metoprolol  2.5 mg Intravenous Q8H    ipratropium-albuterol  1 ampule Inhalation Q4H    fat emulsion  100 mL Intravenous Daily    insulin lispro  0-6 Units Subcutaneous Q6H    sodium chloride flush  10 mL Intravenous 2 times per day    pantoprazole  40 mg Intravenous Daily    And    sodium chloride (PF)  10 mL Intravenous Daily    alteplase  1 mg Intracatheter Once    meropenem  1 g Intravenous Q8H    mometasone-formoterol  2 puff Inhalation BID     Continuous Infusions:    PN-Adult 2-in-1 Central Line (Standard) 65 mL/hr at 19 1748    dextrose      lactated ringers 10 mL/hr at 19 1917     PRN Meds: fentanNYL, LORazepam, glucose, dextrose, glucagon (rDNA), dextrose, sodium phosphate IVPB **OR** sodium phosphate IVPB, sodium chloride flush, ondansetron, potassium chloride, magnesium sulfate    Data:     Past Medical History:   has a past medical history of Abnormal computed tomography of cervical spine, CVA (cerebral vascular accident) ([address] GERD (gastroesophageal reflux disease), Hypertension, Paraproteinemia, and Weight loss. Social History:   reports that she has been smoking. She has a 30.00 pack-year smoking history. She has never used smokeless tobacco. She reports that she drank about 16.8 oz of alcohol per week. She reports that she does not use drugs. Family History: History reviewed. No pertinent family history. Vitals:  BP (!) 88/39   Pulse 95   Temp 98.1 °F (36.7 °C) (Axillary)   Resp 21   Ht 5' (1.524 m)   Wt 102 lb 4.7 oz (46.4 kg)   SpO2 96%   BMI 19.98 kg/m²   Temp (24hrs), Av.5 °F (36.9 °C), Min:98.1 °F (36.7 °C), Max:98.8 °F (37.1 °C)    Recent Labs     19  0608 19  1137 19  1748 19  2349   POCGLU 97 119* 109* 98       I/O(24Hr):     Intake/Output Summary (Last 24 hours) at 2019 0740  Last data filed at 2019 0548  Gross per 24 hour   Intake 1810.58 ml   Output 2150 ml   Net -339.42 ml       Labs:    CBC:   Lab Results   Component Value Date    WBC 18.9 2019    RBC 3.92 2019    RBC 3.66 2012    HGB 11.3 2019    HCT 34.5 2019    MCV 88.0 2019    MCH 28.8 2019    MCHC 32.8 2019    RDW 16.7 2019     2019     2012 MPV 8.7 05/23/2019     CBC with Differential:    Lab Results   Component Value Date    WBC 18.9 05/23/2019    RBC 3.92 05/23/2019    RBC 3.66 05/23/2012    HGB 11.3 05/23/2019    HCT 34.5 05/23/2019     05/23/2019     05/23/2012    MCV 88.0 05/23/2019    MCH 28.8 05/23/2019    MCHC 32.8 05/23/2019    RDW 16.7 05/23/2019    METASPCT 2 05/15/2019    LYMPHOPCT 12 05/23/2019    MONOPCT 8 05/23/2019    MYELOPCT 1 05/07/2019    BASOPCT 0 05/23/2019    MONOSABS 1.51 05/23/2019    LYMPHSABS 2.27 05/23/2019    EOSABS 0.19 05/23/2019    BASOSABS 0.00 05/23/2019    DIFFTYPE NOT REPORTED 05/23/2019     CMP:    Lab Results   Component Value Date     05/23/2019    K 4.7 05/23/2019    CL 98 05/23/2019    CO2 27 05/23/2019    BUN 38 05/23/2019    CREATININE <0.40 05/23/2019    GFRAA CANNOT BE CALCULATED 05/23/2019    LABGLOM CANNOT BE CALCULATED 05/23/2019    GLUCOSE 100 05/23/2019    GLUCOSE 87 05/21/2012    PROT 5.2 05/18/2019    LABALBU 3.5 05/18/2019    LABALBU 4.6 05/17/2012    CALCIUM 8.0 05/23/2019    BILITOT 0.37 05/18/2019    ALKPHOS 62 05/18/2019    AST 13 05/18/2019    ALT 11 05/18/2019     BMP:    Lab Results   Component Value Date     05/23/2019    K 4.7 05/23/2019    CL 98 05/23/2019    CO2 27 05/23/2019    BUN 38 05/23/2019    LABALBU 3.5 05/18/2019    LABALBU 4.6 05/17/2012    CREATININE <0.40 05/23/2019    CALCIUM 8.0 05/23/2019    GFRAA CANNOT BE CALCULATED 05/23/2019    LABGLOM CANNOT BE CALCULATED 05/23/2019    GLUCOSE 100 05/23/2019    GLUCOSE 87 05/21/2012     BUN/Creatinine:    Lab Results   Component Value Date    BUN 38 05/23/2019    CREATININE <0.40 05/23/2019     Hepatic Function Panel:    Lab Results   Component Value Date    ALKPHOS 62 05/18/2019    ALT 11 05/18/2019    AST 13 05/18/2019    PROT 5.2 05/18/2019    BILITOT 0.37 05/18/2019    BILIDIR 0.21 05/11/2019    IBILI 0.17 05/11/2019    LABALBU 3.5 05/18/2019    LABALBU 4.6 05/17/2012     Albumin:    Lab Results Component Value Date    LABALBU 3.5 05/18/2019    LABALBU 4.6 05/17/2012       Lab Results   Component Value Date/Time    SPECIAL R AC 5CC PURPLE 3CC RED 05/16/2019 12:20 PM     Lab Results   Component Value Date/Time    CULTURE NO GROWTH 6 DAYS 05/16/2019 12:20 PM       Radiology:    Ct Abdomen Pelvis Wo Contrast Additional Contrast? Oral    Result Date: 4/14/2019  EXAMINATION: CT OF THE ABDOMEN AND PELVIS WITHOUT CONTRAST 4/14/2019 7:34 pm TECHNIQUE: CT of the abdomen and pelvis was performed without the administration of intravenous contrast. Multiplanar reformatted images are provided for review. Dose modulation, iterative reconstruction, and/or weight based adjustment of the mA/kV was utilized to reduce the radiation dose to as low as reasonably achievable. COMPARISON: 04/11/2019 HISTORY: ORDERING SYSTEM PROVIDED HISTORY: ABDOMINAL PAIN TECHNOLOGIST PROVIDED HISTORY: Water soluble contrast only please Ordering Physician Provided Reason for Exam: Abdominal pain - Vented patient. Contrast given via nurse through NG tube. Acuity: Unknown Type of Exam: Unknown Relevant Medical/Surgical History: Hx - Sepsis due to urinary tract infection. FINDINGS: Lower Chest: New moderate layering bilateral pleural effusions with bilateral lower lobe atelectasis. Organs: Limited evaluation due lack of intravenous contrast.  Cholelithiasis redemonstrated. No gallbladder wall thickening or biliary ductal dilatation. Scattered tiny hypodense lesions in the liver are too small to characterize but statistically represent benign cysts or hemangiomas and appear unchanged. The pancreas, spleen, adrenal glands, and kidneys are unremarkable. There is no hydronephrosis or urinary tract calculus. GI/Bowel: The stomach is distended. Enteric tube is in place. No contrast is seen distal to the pylorus and there is contrast reflux into the distal esophagus. There is no evidence of bowel obstruction.   The appendix is not definitely visualized. No focal pericecal inflammatory changes are evident. Pelvis: The urinary bladder is decompressed by Tran catheter. No pelvic mass is seen. Peritoneum/Retroperitoneum: Small amount of free fluid in the pelvic cavity. No free air or focal fluid collection. No abnormal lymph node. Normal abdominal aortic caliber. Moderate calcific atherosclerosis. Bones/Soft Tissues: No acute osseous abnormality. Diffuse anasarca. Moderate degenerative changes in the lumbar spine. 1. Distended, contrast filled stomach with reflux of contrast into the esophagus. No enteric contrast is seen distal to the pylorus suggesting delayed gastric emptying in the setting of ileus. 2. New moderate layering bilateral pleural effusions with bilateral lower lobe atelectasis. 3. Small amount of nonspecific free fluid in the pelvic cavity. 4. Anasarca. 5. Cholelithiasis. Xr Chest (single View Frontal)    Result Date: 5/11/2019  EXAMINATION: ONE XRAY VIEW OF THE CHEST 5/11/2019 6:28 am COMPARISON: 10 May 2019 HISTORY: ORDERING SYSTEM PROVIDED HISTORY: Respiratory failure TECHNOLOGIST PROVIDED HISTORY: Respiratory failure Ordering Physician Provided Reason for Exam: Respiratory failure Acuity: Unknown Type of Exam: Unknown FINDINGS: AP portable view of the chest time stamped at 603 hours demonstrates findings of generalized COPD. Overlying cardiac monitoring electrodes are present. An intestinal tube extends below the fundus of the stomach, tip not included. Right central line terminates in the distal superior vena cava. Bipolar pacemaker enters from the left with intact leads in appropriate positions. Heart size is stable, top-normal.  Lung fields are hyperinflated suggestive of COPD. Interstitial markings are coarsened, similar to that noted in 2015 likely chronic interstitial change. There is improved aeration at the left lung base with slight blunting of the left costophrenic angle suggesting effusion.   There is been effusions, better visualized on lateral view. 2.  Upper lobe interstitial opacities, slightly improved when compared to the previous study, possibly related to underlying fibrotic change or residual infiltrate. Xr Knee Left (1-2 Views)    Result Date: 5/8/2019  EXAMINATION: 2 XRAY VIEWS OF THE LEFT KNEE 5/7/2019 11:19 am COMPARISON: Left knee radiographs 03/30/2019. HISTORY: ORDERING SYSTEM PROVIDED HISTORY: fracture TECHNOLOGIST PROVIDED HISTORY: fracture Ordering Physician Provided Reason for Exam: left knee/distal femur pain Acuity: Acute Type of Exam: Initial FINDINGS: Bones are osteopenic. No suprapatellar joint effusion. There is a impacted, transverse fracture of the distal femoral metadiaphysis. Increased sclerosis along the fracture line suggests interval healing. Fracture fragments are in unchanged, near anatomic alignment. No acute dislocation. No new fracture is seen. Impacted, transverse fracture of the distal femoral metadiaphysis is in unchanged alignment and demonstrates interval healing. Xr Abdomen (kub) (single Ap View)    Result Date: 5/10/2019  EXAMINATION: ONE SUPINE XRAY VIEW(S) OF THE ABDOMEN 5/10/2019 9:14 am COMPARISON: Small-bowel series 05/09/2019 HISTORY: ORDERING SYSTEM PROVIDED HISTORY: Small bowel obstruction ([address] TECHNOLOGIST PROVIDED HISTORY: TO ACCOMPANY SMALL BOWEL EXAM SMALL BOWEL OBSTRUCTION Ordering Physician Provided Reason for Exam: SMALL BOWEL FOLLOW THRU - 20 HOUR DELAYED FILM FINDINGS: Significant interval decreased amount of retained contrast in the stomach compared to last image from earlier small bowel series which likely relates to suctioning of the contrast.  Majority of previously seen contrast within the pelvis likely in the rectum is no longer visualized and has likely passed through. Residual contrast remains within the colon and small bowel. NG tube is in place with side hole in the region of the GE junction.   Partially visualized mild left pleural effusion associated atelectasis. 1. Interval presumed suctioning of contrast from the stomach which now appears relatively empty. Continued progression of contrast through the small and large bowel as described. 2. NG tube side hole at the level of the GE junction, consider advancement of 2-3 cm. Xr Abdomen (kub) (single Ap View)    Result Date: 5/8/2019  EXAMINATION: SINGLE SUPINE XRAY VIEW(S) OF THE ABDOMEN 5/8/2019 8:59 am COMPARISON: CT abdomen from 05/05/2019, and KUB from 04/19/2019 HISTORY: ORDERING SYSTEM PROVIDED HISTORY: recheck obstruction TECHNOLOGIST PROVIDED HISTORY: recheck obstruction Ordering Physician Provided Reason for Exam: recheck obstruction Acuity: Unknown Type of Exam: Unknown FINDINGS: Again noted cholecystostomy tube, electrode leads from a pacemaker overlying the heart, and overlying electrode leads/tubing. NG tube no longer demonstrated. Gas-filled bowel loops without marked dilatation; cecum measures 8 cm, slightly increased as compared to the previous study. No obvious free air. No mass or organomegaly. Bones unchanged. Retrocardiac opacity, hyperinflated lungs and probable pleural effusions. NG tube removed. Gas-filled bowel more suggestive ileus; no clear cut obstruction. Cecum measures 8 cm. Cholecystostomy tube in unchanged position. Additional unchanged findings, as above. RECOMMENDATION: Continued clinical correlation and follow-up     Xr Abdomen (kub) (single Ap View)    Result Date: 4/19/2019  EXAMINATION: SINGLE SUPINE XRAY VIEW(S) OF THE ABDOMEN 4/19/2019 9:48 am COMPARISON: April 14, 2019 HISTORY: ORDERING SYSTEM PROVIDED HISTORY: pain TECHNOLOGIST PROVIDED HISTORY: pain Ordering Physician Provided Reason for Exam: Abdominal pain and distentsion Acuity: Acute Type of Exam: Initial Additional signs and symptoms: Abdominal pain and distentsion FINDINGS: Enteric tube with the tip within the stomach. Small left pleural effusion.   Minimal right pleural effusion. Mildly dilated loops of bowel. Nonspecific bowel gas pattern with mildly dilated loops of bowel. Xr Abdomen (kub) (single Ap View)    Result Date: 4/14/2019  EXAMINATION: SINGLE SUPINE XRAY VIEW(S) OF THE ABDOMEN 4/14/2019 7:39 am COMPARISON: CT abdomen and pelvis  film from 11 April 2019 HISTORY: [address] [facility] [address] [facility] [address]: Abd Distention TECHNOLOGIST PROVIDED HISTORY: Abd Distention Ordering Physician Provided Reason for Exam: Abdominal distention. Pt was moving around in the bed. Best films at present time. Acuity: Acute Type of Exam: Initial Additional signs and symptoms: Abdominal distention. Pt was moving around in the bed. Best films at present time. FINDINGS: Portable view time stamped at 748 hours demonstrates an intestinal tube terminating in the midportion of a gaseous Spike dilated stomach. Densities are present over the stomach likely medication. Bipolar pacemaker is in situ with intact leads. Heart size is top-normal, stable. Gaseous distension of the stomach and loop of bowel in the upper mid abdomen is noted but there is gas and fecal material in the rectum. Gastric outlet obstruction or proximal small bowel partial obstruction is suspected. No free air is noted. Midline city is present over the pelvis likely a monitor or CT small bore catheter. Vascular calcification is present in the pelvis. Persistent preferential gaseous distension of the stomach although there is some gas in the upper abdomen and gas and fecal material present in the rectosigmoid. Findings suggest gastric outlet obstruction. Ct Abdomen W Contrast Additional Contrast? Radiologist Recommendation    Result Date: 5/5/2019  EXAMINATION: CT ABDOMEN WITH CONTRAST, 5/5/2019 3:38 pm TECHNIQUE: CT of the abdomen was performed with the administration of intravenous contrast. Multiplanar reformatted images are provided for review.  Dose modulation, iterative reconstruction, and/or weight based adjustment of the mA/kV was utilized to reduce the radiation dose to as low as reasonably achievable. COMPARISON: April 14, 2019 HISTORY: ORDERING SYSTEM PROVIDED HISTORY: Check for abscess/fluid collection around ashley tube site. TECHNOLOGIST PROVIDED HISTORY: Ordering Physician Provided Reason for Exam: Patient c/o abd pain around her ashley site and drainage tube. FINDINGS: Evaluation is limited by motion. Lower Chest: Moderate bilateral pleural effusions. Organs: Multiple liver hypodensities measuring up to 4 mm are incompletely characterized but in the absence of risk factors likely represent cysts requiring no specific follow-up. Cholecystostomy tube is in place. No adjacent focal drainable fluid collection. No pancreatic lesion. No splenomegaly. No adrenal lesion. No hydronephrosis. No renal lesion. GI/Bowel: Stomach is markedly distended. Swirled appearance of the mesentery is seen within the mid to lower abdomen with adjacent thickened loops of small bowel. Peritoneum/Retroperitoneum: Atherosclerotic calcification of the abdominal aorta without aneurysmal dilatation. No adenopathy. Bones/Soft Tissues: No acute soft tissue abnormality. Diffuse osteopenia. Lumbar spine degenerative changes. Bowel obstruction which is suspected to be caused by an internal hernia. Cholecystostomy tube is in place. No surrounding fluid collection. Nm Gi Blood Loss    Result Date: 5/3/2019  EXAMINATION: NUCLEAR MEDICINE GASTRIC BLEEDING STUDY 5/3/2019 TECHNIQUE: Following the intravenous injection of 23.8 mCi of 99 mTc-labeled RBC's, a flow study and standard images of the abdomen was obtained over a total period of 60 minutes COMPARISON: None.  HISTORY: ORDERING SYSTEM PROVIDED HISTORY: GI BLEEDING TECHNOLOGIST PROVIDED HISTORY: Ordering Physician Provided Reason for Exam: GI bleeding Acuity: Unknown Type of Exam: Unknown FINDINGS: Activity is seen within the blood pool, including the great vessels, heart, liver, SYSTEM PROVIDED HISTORY: ABDOMINAL PAIN FINDINGS: Pancreas is not well visualized. No liver lesion. Gallbladder wall thickening. Gallbladder sludge. Cholelithiasis. Pericholecystic fluid. Common bile duct measures at the upper limits of normal at 7 mm. Findings suggesting acute cholecystitis. Xr Chest Portable    Result Date: 5/10/2019  EXAMINATION: ONE XRAY VIEW OF THE CHEST 5/10/2019 1:28 am COMPARISON: May 2, 2019. HISTORY: ORDERING SYSTEM PROVIDED HISTORY: low o2 sat TECHNOLOGIST PROVIDED HISTORY: low o2 sat Ordering Physician Provided Reason for Exam: Low o2 sat Acuity: Acute Type of Exam: Initial FINDINGS: Hyperexpanded right lung volume. Left greater than right basilar heterogeneous opacities with left basilar consolidation. Small bilateral pleural effusions. Stable cardiomediastinal silhouette and great vessels. Enteric contrast in the stomach and distal esophagus. Enteric tube courses into the stomach but tip is not definitely seen due to contrast in the stomach. Stable left pectoral cardiac pacer device. Stable right PICC. Left greater than right basilar heterogeneous opacities with left basilar consolidation. Differential considerations include atelectasis and an infectious/inflammatory process. Small bilateral pleural effusions. Enteric contrast in the stomach and distal esophagus. Enteric tube courses into the stomach but tip is not definitely seen due to contrast in the stomach. Xr Chest Portable    Result Date: 4/27/2019  EXAMINATION: SINGLE XRAY VIEW OF THE CHEST 4/27/2019 8:47 am COMPARISON: 04/26/2019 HISTORY: ORDERING SYSTEM PROVIDED HISTORY: Assess for pulm edema vs PNA TECHNOLOGIST PROVIDED HISTORY: Assess for pulm edema vs PNA Ordering Physician Provided Reason for Exam: cough, congestion Acuity: Acute Type of Exam: Initial FINDINGS: Right IJ central venous catheter is in place tip in the SVC right atrial junction. Pacer wires are in place.   The heart and mediastinal structures are stable. Pleural effusions are present with bibasilar atelectasis. Bilateral lung infiltrates are present in the upper lung fields. Persistent pleural effusions with bibasilar atelectasis and bilateral lung infiltrates with areas of consolidation developing in the upper lobes. Xr Chest Portable    Result Date: 4/26/2019  EXAMINATION: SINGLE XRAY VIEW OF THE CHEST 4/26/2019 6:51 am COMPARISON: April 24, 2019 HISTORY: ORDERING SYSTEM PROVIDED HISTORY: Pleural effusions TECHNOLOGIST PROVIDED HISTORY: Pleural effusions Ordering Physician Provided Reason for Exam: pleural effusion Acuity: Acute Type of Exam: Initial FINDINGS: Right IJ line in good position. Pacer device is stable. Cardiac leads overlie the chest.  Stable cardiomediastinal contours with calcified aortic arch. Left effusion and associated airspace disease, slightly decreased. Chronic blunting of right costophrenic sulcus may represent scarring or chronic effusion. Increased reticular markings right upper chest and left upper chest possibly interstitial edema or interstitial pneumonia. No new airspace consolidation. Increasing reticular markings upper chest bilaterally right greater than left possibly due to edema or interstitial pneumonitis. Improving left effusion with associated retrocardiac airspace disease. Pleural-parenchymal scarring or effusion in the right lung base, stable. Xr Chest Portable    Result Date: 4/24/2019  EXAMINATION: SINGLE XRAY VIEW OF THE CHEST 4/24/2019 6:05 am COMPARISON: Chest radiograph dated 04/22/2019 HISTORY: ORDERING SYSTEM PROVIDED HISTORY: Acute respiratory failure, pleural effusion TECHNOLOGIST PROVIDED HISTORY: Acute respiratory failure, pleural effusion Ordering Physician Provided Reason for Exam: pleural effusion, respiratory failure. Acuity: Acute Type of Exam: Initial FINDINGS: Heart size is normal.  Dual-chamber pacemaker placed via left subclavian approach.   Right internal jugular central line has tip in distal superior vena cava. Interval removal of nasogastric tube. No interval change of infiltrate and atelectasis at the left lung base. Stable mild infiltrate in the left upper lobe. Mild interval improvement of infiltrate at the right lung base. Stable small bilateral pleural effusions, left larger than right. Mild CHF, stable. 1.  No interval change of infiltrate and atelectasis at the left lung base. Stable mild infiltrate in the left upper lobe. 2.  Mild interval improvement of infiltrate at the right lung base. 3.  Stable small bilateral pleural effusions, left larger than right. 4.  Mild CHF, stable. Xr Chest Portable    Result Date: 4/22/2019  EXAMINATION: SINGLE XRAY VIEW OF THE CHEST 4/22/2019 6:44 am COMPARISON: April 21, 2019. HISTORY: ORDERING SYSTEM PROVIDED HISTORY: Hypoxia and CHF TECHNOLOGIST PROVIDED HISTORY: Hypoxia and CHF Ordering Physician Provided Reason for Exam: hx of hypoxia/CHF Acuity: Acute Type of Exam: Initial FINDINGS: Right IJ central venous catheter appears in unchanged position. Nasogastric tube courses below the diaphragm, with tip not imaged. Left chest wall pacemaker device projects in unchanged position. Cardiac and mediastinal contours are enlarged but unchanged. Unchanged bilateral pleural effusions and bibasilar pulmonary opacities. Unchanged findings suggestive of congestive heart failure. No evidence of pneumothorax. No new osseous abnormalities. 1. No significant change in findings suggestive of congestive heart failure. 2. Unchanged bilateral pleural effusions and bibasilar pulmonary consolidations. RECOMMENDATION: Radiographic follow-up to complete resolution.      Xr Chest Portable    Result Date: 4/21/2019  EXAMINATION: SINGLE XRAY VIEW OF THE CHEST 4/21/2019 3:08 pm COMPARISON: April 19, 2019 HISTORY: ORDERING SYSTEM PROVIDED HISTORY: hypoxia TECHNOLOGIST PROVIDED HISTORY: hypoxia Ordering Physician Provided Reason for Exam: hypoxia Acuity: Unknown Type of Exam: Unknown FINDINGS: Enteric tube in the stomach. ET tube is been removed. Right IJ catheter terminates in the atrial caval junction. Bipolar pacer on the left unchanged. Heart and mediastinum unremarkable. Moderate edema unchanged. Small effusions, left greater than right. Bony thorax intact. Status post extubation. Life support apparatus otherwise stable. Moderate edema and effusions unchanged. Xr Chest Portable    Result Date: 4/19/2019  EXAMINATION: SINGLE XRAY VIEW OF THE CHEST 4/19/2019 5:51 am COMPARISON: April 18, 2019 HISTORY: ORDERING SYSTEM PROVIDED HISTORY: ETT placement TECHNOLOGIST PROVIDED HISTORY: ETT placement Ordering Physician Provided Reason for Exam: Vent Pt check ETT placement Acuity: Acute Type of Exam: Subsequent/Follow-up Additional signs and symptoms: Vent Pt check ETT placement FINDINGS: Tubes and lines are unchanged. Persistent bibasilar lung opacities and pleural effusions. Mild pulmonary edema. No significant interval change. Xr Chest Portable    Result Date: 4/18/2019  EXAMINATION: SINGLE XRAY VIEW OF THE CHEST 4/18/2019 6:21 am COMPARISON: April 17, 2019 HISTORY: ORDERING SYSTEM PROVIDED HISTORY: ETT placement TECHNOLOGIST PROVIDED HISTORY: ETT placement Ordering Physician Provided Reason for Exam: on vent Acuity: Acute Type of Exam: Initial FINDINGS: Right IJ line and NG tube are stable. Interval advancement of ET tube terminating 3.1 cm from ron. Implanted cardiac device is present. Left-sided effusion and associated airspace disease is stable. Hazy right basilar opacity possibly edema or atelectasis. No pneumothorax. Increased opacity left upper chest possibly focal area of atelectasis, new from prior. Advanced ETT from prior exam, now 3.1 cm from ron. Increased opacity left upper chest suspicious for atelectasis. Additional supporting devices are stable.      Xr Chest Portable    Result Date: 4/17/2019  EXAMINATION: SINGLE XRAY VIEW OF THE CHEST 4/17/2019 7:15 am COMPARISON: 16 April 2019 HISTORY: ORDERING SYSTEM PROVIDED HISTORY: ETT placement TECHNOLOGIST PROVIDED HISTORY: ETT placement Ordering Physician Provided Reason for Exam: on vent Acuity: Acute Type of Exam: Initial FINDINGS: AP portable view of the chest time stamped at 613 hours demonstrates overlying cardiac monitoring electrodes. A right internal jugular catheter terminates in the superior vena cava. Endotracheal tube is somewhat high riding terminating 6.4 cm above the ron. Intestinal tube extends beyond the body of the stomach, tip not included on the exam.  Bipolar pacemaker enters from the left with intact leads in appropriate positions. Heart size is normal.  Left pleural effusion is noted there is continued volume loss in the left hemithorax with stable mild vascular congestion, perihilar opacities, and faint opacity in the right mid and lower lung field. Underlying COPD is noted. Osseous structures are stable. There is little change from prior study. Findings of COPD, mild central vascular congestion, left effusion, and scattered opacities favoring interstitial edema with multifocal pneumonitis not excluded. Tubes and lines as above. However, endotracheal tube is high riding. The findings were sent to the Radiology Results [address] at 7:58 am on 4/17/2019to be communicated to a licensed caregiver. RECOMMENDATION: Suggest advancement of endotracheal tube.      Xr Chest Portable    Result Date: 4/16/2019  EXAMINATION: SINGLE XRAY VIEW OF THE CHEST 4/16/2019 6:54 am COMPARISON: 04/15/2019, 610 hours HISTORY: ORDERING SYSTEM PROVIDED HISTORY: ETT placement TECHNOLOGIST PROVIDED HISTORY: ETT placement Ordering Physician Provided Reason for Exam: on vent Acuity: Acute Type of Exam: Initial 49-year-old female on ventilator; check endotracheal tube placement FINDINGS: Portable AP upright view of the chest. Endotracheal tube distal tip overlying the mid trachea approximately 4.1 cm above the level of the ron. Enteric tube traverses the GE junction with distal tip excluded from the field of view. Left subclavian approach cardiac pacemaker device distal lead tips relatively stable in position. Right internal jugular approach central venous catheter distal tip overlying the high right atrium, stable. Cardiac monitor leads overlie the chest. Atherosclerotic calcification of the thoracic aorta. Slight stable volume loss of the left hemithorax. No pneumothorax. No free air. Dense retrocardiac/left basilar airspace consolidation and small left-sided pleural effusion. Stable mild focal opacity at the right mid lung zone. Underlying COPD. Stable mild pulmonary vascular congestion and left-sided predominant parahilar opacity. Visualized osseous structures remain unchanged. 1. Stable multifocal airspace disease as detailed above with dense retrocardiac/left basilar airspace consolidation and small left-sided pleural effusion. Mild pulmonary vascular congestion. Findings may represent edema or multifocal pneumonia. 2. Underlying COPD. 3. Tubes and line as detailed above. Xr Chest Portable    Result Date: 4/15/2019  EXAMINATION: SINGLE XRAY VIEW OF THE CHEST 4/15/2019 6:47 am COMPARISON: 14 April 2019 HISTORY: ORDERING SYSTEM PROVIDED HISTORY: ETT placement TECHNOLOGIST PROVIDED HISTORY: ETT placement Ordering Physician Provided Reason for Exam: on vent Acuity: Acute Type of Exam: Initial FINDINGS: AP portable view of the chest time stamped at 612 hours demonstrates overlying cardiac monitoring electrodes. Endotracheal tube terminates 4 cm above the ron. Bipolar pacemaker enters from the left with intact leads in appropriate positions. Intestinal tube extends beyond the fundus of the stomach, tip not included. Right internal jugular catheter terminates at the cavoatrial junction.   Heart size is normal. Aortic arch is calcified. There is interval improvement in vascular congestion with resolution of perihilar opacities. Some bibasilar opacities remain. No extrapleural air is noted. Osseous structures are stable. Interval improvement in vascular congestion and bilateral opacities consistent with resolving pulmonary edema. Tubes and lines as above. Xr Chest Portable    Result Date: 4/14/2019  EXAMINATION: SINGLE XRAY VIEW OF THE CHEST 4/14/2019 7:57 am COMPARISON: Portable chest 04/13/2019. HISTORY: ORDERING SYSTEM PROVIDED HISTORY: Intubation TECHNOLOGIST PROVIDED HISTORY: Intubation Ordering Physician Provided Reason for Exam: intubation Acuity: Acute Type of Exam: Initial Additional signs and symptoms: intubation FINDINGS: Endotracheal tube terminates over the midthoracic trachea. Dual-chamber pacemaker leads appear unchanged in position. Right IJ approach central venous catheter unchanged in position. Heart size not substantially changed. Perihilar and basilar opacities further increased. Left pleural effusion increased in size. Findings may reflect pulmonary edema, progressed from yesterday's exam.  Left pleural effusion increased in size.      Vl Upper Extremity Bilateral Venous Duplex    Result Date: 4/22/2019    [facility]  Vascular Upper Extremities Veins Procedure   Patient Name   Ronda Mejias     Date of Study           04/22/2019                 Eddye Loop   Date of Birth  1950  Gender                  Female   Age            77 year(s)  Race                       Room Number    2002        Height:                 59.84 inch, 152 cm   Corporate ID # [id number]    Weight:                 102 pounds, 46.3 kg   Patient Acct # [de-identified]   BSA:        1.4 m^2     BMI:       20.03 kg/m^2   MR #           [id number]      Sonographer             Roderick Mario   Accession #    [id number]   Interpreting Physician  Juliana Fajardo   Referring                  Referring Physician Marco A Marin *  Nurse  Practitioner  Procedure Type of Study:   Veins: Upper Extremities Veins, Venous Scan Upper Bilateral.  Indications for Study:Swelling. Patient Status: In Patient. Technical Quality:Limited visualization. Limitation reason:Line placements. Conclusions   Summary   No evidence of superficial or deep venous thrombosis in both upper  extremities. Signature   ----------------------------------------------------------------  Electronically signed by Roderick Mario(Sonographer) on  04/22/2019 11:46 AM  ----------------------------------------------------------------   ----------------------------------------------------------------  Electronically signed by Airam Oliveira(Interpreting  physician) on 04/22/2019 09:31 PM  ----------------------------------------------------------------  Findings:   Right Impression:                  Left Impression:  Internal jugular vein not          The internal jugular, subclavian,  visualized due to line placement. axillary, brachial, radial, and ulnar                                     veins demonstrate normal  Subclavian, axillary, brachial,    compressibility with phasic Doppler  radial, and ulnar veins            signals. demonstrate normal compressibility  with phasic Doppler signals. Normal compressibility of the cephalic                                     vein. Normal compressibility of the  cephalic vein. Normal compressibility of the basilic                                     vein. Normal compressibility of the  basilic vein. Velocities are measured in cm/s ; Diameters are measured in cm Right UE Vein Measurements 2D Measurements +------------------------------------+----------+---------------+----------+ ! Location                            ! Visualized! Compressibility! Thrombosis! +------------------------------------+----------+---------------+----------+ ! Prox SCV                            ! Yes       ! Yes !None      ! +------------------------------------+----------+---------------+----------+ ! Dist SCV                            ! Yes       ! Yes            ! None      ! +------------------------------------+----------+---------------+----------+ ! Prox Axillary                       ! Yes       ! Yes            ! None      ! +------------------------------------+----------+---------------+----------+ ! Dist Axillary                       ! Yes       ! Yes            ! None      ! +------------------------------------+----------+---------------+----------+ ! Prox Brachial                       !Yes       ! Yes            ! None      ! +------------------------------------+----------+---------------+----------+ ! Dist Brachial                       !Yes       ! Yes            ! None      ! +------------------------------------+----------+---------------+----------+ ! Prox Radial                         !Yes       ! Yes            ! None      ! +------------------------------------+----------+---------------+----------+ ! Dist Radial                         !Yes       ! Yes            ! None      ! +------------------------------------+----------+---------------+----------+ ! Prox Ulnar                          ! Yes       ! Yes            ! None      ! +------------------------------------+----------+---------------+----------+ ! Dist Ulnar                          ! Yes       ! Yes            ! None      ! +------------------------------------+----------+---------------+----------+ ! Basilic at UA                       ! Yes       ! Yes            ! None      ! +------------------------------------+----------+---------------+----------+ ! Basilic at AF                       ! Yes       ! Yes            ! None      ! +------------------------------------+----------+---------------+----------+ ! Yessenia at [facility]                       ! Yes       ! Yes            ! None      ! +------------------------------------+----------+---------------+----------+ ! Gideon at UA                      ! Yes       ! Yes            ! None      ! +------------------------------------+----------+---------------+----------+ ! Cephalic at AF                      ! Yes       ! Yes            ! None      ! +------------------------------------+----------+---------------+----------+ ! Cephalic at [address]                      ! Yes       ! Yes            ! None      ! +------------------------------------+----------+---------------+----------+ Doppler Measurements +-------------------------+-----------------------+------------------------+ ! Location                 ! Signal                 !Reflux                  ! +-------------------------+-----------------------+------------------------+ ! SCV                      ! Phasic                 ! No                      ! +-------------------------+-----------------------+------------------------+ ! Axillary                 ! Phasic                 ! No                      ! +-------------------------+-----------------------+------------------------+ ! Brachial                 !Phasic                 ! No                      ! +-------------------------+-----------------------+------------------------+ Left UE Vein Measurements 2D Measurements +------------------------------------+----------+---------------+----------+ ! Location                            ! Visualized! Compressibility! Thrombosis! +------------------------------------+----------+---------------+----------+ ! Prox IJV                            ! Yes       ! Yes            ! None      ! +------------------------------------+----------+---------------+----------+ ! Dist IJV                            ! Yes       ! Yes            ! None      ! +------------------------------------+----------+---------------+----------+ ! Prox SCV                            ! Yes       ! Yes            ! None      ! +------------------------------------+----------+---------------+----------+ ! Dist SCV                            ! Yes !Reflux                  ! +-------------------------+-----------------------+------------------------+ ! IJV                      ! Phasic                 !                        ! +-------------------------+-----------------------+------------------------+ ! SCV                      ! Phasic                 !                        ! +-------------------------+-----------------------+------------------------+ ! Axillary                 ! Phasic                 !                        ! +-------------------------+-----------------------+------------------------+ ! Brachial                 !Phasic                 !                        ! +-------------------------+-----------------------+------------------------+    Vl Dup Lower Extremity Venous Bilateral    Result Date: 5/7/2019    [facility]  Vascular Lower Extremities DVT Study Procedure   Patient Name   LUCIA [facility]     Date of Study           05/07/2019                 Dale Parker   Date of Birth  1950  Gender                  Female   Age            77 year(s)  Race                       Room Number    2123        Height:                 59.84 inch, 152 cm   Corporate ID # [id number]    Weight:                 102 pounds, 46.3 kg   Patient Acct # [de-identified]   BSA:        1.4 m^2     BMI:      20.03 kg/m^2   MR #           [id number]      Sonographer             Dona Mccoy [facility]   Accession #    [id number]   Interpreting Physician  Maureen Griffin   Referring                  Referring Physician     Alexsander Reyes MD  Nurse  Practitioner  Procedure Type of Study:   Veins: Lower Extremities DVT Study, Venous Scan Lower Bilateral.  Indications for Study:Pain and swelling. Patient Status: In Patient. Technical Quality:Limited visualization. Limitation reason:patient movement. Conclusions   Summary   No evidence of superficial or deep venous thrombosis in both lower  extremities. Technically limited visualization.    Signature ----------------------------------------------------------------  Electronically signed by Gardenia Ray RVT(Sonographer) on  2019 01:22 PM  ----------------------------------------------------------------   ----------------------------------------------------------------  Electronically signed by Berenda Apgar Farooq(Interpreting  physician) on 2019 06:45 PM  ----------------------------------------------------------------  Findings:   Right Impression:                    Left Impression:  The common femoral, femoral,         The common femoral, femoral,  popliteal and tibial veins           popliteal and tibial veins  demonstrate normal compressibility   demonstrate normal compressibility  and augmentation. and augmentation. Limited visualization of the calf    Limited visualization of the calf  veins. veins. Normal compressibility of the great  Normal compressibility of the great  saphenous vein. saphenous vein. Normal compressibility of the small  Normal compressibility of the small  saphenous vein. saphenous vein. Velocities are measured in cm/s ; Diameters are measured in cm Right Lower Extremities DVT Study Measurements Right 2D Measurements +------------------------------------+----------+---------------+----------+ ! Location                            ! Visualized! Compressibility! Thrombosis! +------------------------------------+----------+---------------+----------+ ! Common Femoral                      !Yes       ! Yes            ! None      ! +------------------------------------+----------+---------------+----------+ ! Prox Femoral                        !Yes       ! Yes            ! None      ! +------------------------------------+----------+---------------+----------+ ! Mid Femoral                         !Yes       ! Yes            ! None      ! +------------------------------------+----------+---------------+----------+ ! Dist Femoral                        !Yes       ! Yes            ! None      ! +------------------------------------+----------+---------------+----------+ ! Deep Femoral                        !Yes       ! Yes            ! None      ! +------------------------------------+----------+---------------+----------+ ! Popliteal                           !Yes       ! Yes            ! None      ! +------------------------------------+----------+---------------+----------+ ! Sapheno Femoral Junction            ! Yes       ! Yes            ! None      ! +------------------------------------+----------+---------------+----------+ ! PTV                                 ! Partial   !Yes            ! None      ! +------------------------------------+----------+---------------+----------+ ! Peroneal                            !Partial   !Yes            ! None      ! +------------------------------------+----------+---------------+----------+ ! Gastroc                             ! Yes       ! Yes            ! None      ! +------------------------------------+----------+---------------+----------+ ! GSV Thigh                           ! Yes       ! Yes            ! None      ! +------------------------------------+----------+---------------+----------+ ! GSV Knee                            ! Yes       ! Yes            ! None      ! +------------------------------------+----------+---------------+----------+ ! GSV Ankle                           ! Yes       ! Yes            ! None      ! +------------------------------------+----------+---------------+----------+ ! SSV                                 ! Partial   !Yes            ! None      ! +------------------------------------+----------+---------------+----------+ Left Lower Extremities DVT Study Measurements Left 2D Measurements +------------------------------------+----------+---------------+----------+ ! Location !Visualized! Compressibility! Thrombosis! +------------------------------------+----------+---------------+----------+ ! Common Femoral                      !Yes       ! Yes            ! None      ! +------------------------------------+----------+---------------+----------+ ! Prox Femoral                        !Yes       ! Yes            ! None      ! +------------------------------------+----------+---------------+----------+ ! Mid Femoral                         !Yes       ! Yes            ! None      ! +------------------------------------+----------+---------------+----------+ ! Dist Femoral                        !Yes       ! Yes            ! None      ! +------------------------------------+----------+---------------+----------+ ! Deep Femoral                        !Yes       ! Yes            ! None      ! +------------------------------------+----------+---------------+----------+ ! Popliteal                           !Yes       ! Yes            ! None      ! +------------------------------------+----------+---------------+----------+ ! Sapheno Femoral Junction            ! Yes       ! Yes            ! None      ! +------------------------------------+----------+---------------+----------+ ! PTV                                 ! Partial   !Yes            ! None      ! +------------------------------------+----------+---------------+----------+ ! Peroneal                            !Partial   !Yes            ! None      ! +------------------------------------+----------+---------------+----------+ ! Gastroc                             ! Yes       ! Yes            ! None      ! +------------------------------------+----------+---------------+----------+ ! GSV Thigh                           ! Yes       ! Yes            ! None      ! +------------------------------------+----------+---------------+----------+ ! GSV Knee                            ! Yes       ! Yes            ! None      ! up to 4 hours were obtained. COMPARISON: Ultrasound 04/19/2019 HISTORY: ORDERING SYSTEM PROVIDED HISTORY: CHOLECYSTITIS TECHNOLOGIST PROVIDED HISTORY: Ordering Physician Provided Reason for Exam: Cholecystitis Acuity: Unknown Type of Exam: Unknown FINDINGS: Prompt, homogenous uptake by the liver is noted with normal appearance of radiotracer excretion into the biliary system. Clearance of bloodpool activity appears appropriate. Normal small bowel activity is seen. Prominent activity within the common bile duct. The gallbladder is not visualized by the end of the exam.     Absent filling of the gallbladder consistent with acute cholecystitis. Fl Small Bowel Follow Through Only    Result Date: 5/10/2019  EXAMINATION: SMALL BOWEL FOLLOW THROUGH SERIES 5/9/2019 TECHNIQUE: Small bowel follow through series was performed with overhead images. FLUOROSCOPY DOSE AND TYPE OR TIME AND EXPOSURES: No fluoroscopic images obtained. COMPARISON: CT abdomen pelvis 05/05/2019 HISTORY: ORDERING SYSTEM PROVIDED HISTORY: internal hernia TECHNOLOGIST PROVIDED HISTORY: Water soluble contrast only. Study to be done ONLY if NGT is placed by IR internal hernia FINDINGS:  image demonstrates NG tube overlying the left side of the abdomen within the stomach. Surgical drain overlies the right side of the abdomen. There is a nonspecific bowel gas pattern with mild dilated loops of bowel in lower abdomen. Initial images demonstrate filling of the stomach. At 1 hour and 45 minutes no significant contrast is noted in the small bowel. The 4-1/2 hour image demonstrates contrast within loops of bowel within the mid and lower abdomen and pelvis. There appears to be contrast extending to the colon in the right side of the abdomen. Contrast is noted within the pelvis which may be within the bladder or rectum. Significant delay in emptying of the stomach with persistent contrast noted at the 6 hour concha.  There is contrast extending into Hospital Problems    Diagnosis Date Noted    Acute respiratory failure ([facility] [J96.00] 05/18/2019    Small bowel obstruction ([facility] [K56.609]     Anxiety disorder [F41.9]     Noncompliance [Z91.19]     Anemia [D64.9]     GI bleed [K92.2] 05/03/2019    Hemorrhage of rectum and anus [K62.5]     Centrilobular emphysema ([facility] [J43.2] 04/30/2019    CRP elevated [R79.82]     Elevated procalcitonin [R79.89]     Bandemia [D72.825]     Cholecystitis [K81.9]     Abdominal distention [R14.0]     Elevated CEA [R97.0]     Elevated CA 19-9 level [R97.8]     Urinary retention [R33.9]     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]     Septic shock (HCC) [A41.9, R65.21]     Leukemoid reaction [D72.823]     Aspiration pneumonia of right lower lobe due to vomit ([facility] [J69.0]     MRSA carrier [Z22.322]     Sepsis due to urinary tract infection ([facility] [A41.9, N39.0] 04/11/2019    Cystitis [N30.90] 04/11/2019     Plan:        Acute respiratory failure s/p ex-lap, open cholecystectomy, R colectomy w/ ileocolic anastomosis, extensive enterolysis, POD#8  - Pulm following  - Solumedrol dc/d  - Lasix held this AM, low BP overnight  - Surgery following - CLD, advance diet as tolerated  - ID Following - Merrem  - C/w pain control PRN    Anemia 2/2 ABLA vs. AOCD, stable  - Hgb 11.3, stable    Paroxysmal A.fib  - Has stayed in NSR  - Cardiology following - amio PO    Hypothyroid  - TSH 18.35, free T4   - Levothyroxine 50mcg    DVT PPx  - Lovenox  Dispo  - PT/OT  - SW following - LTAC if remains on TPN, SNF if PO    Lakshmi Schuster MD  5/23/2019  7:40 AM         Attestation and add on       I have discussed the care of Sue Kehr , including pertinent history and exam findings,   5/23/19  with the resident. I have seen and examined the patient and the key elements of all parts of the encounter have been performed by me . I agree with the assessment, plan and orders as documented by the resident.        Principal Problem: Acute respiratory failure (HCC)  Active Problems:    Sepsis due to urinary tract infection (HCC)    Cystitis    Emphysematous cystitis    Septic shock (HCC)    Leukemoid reaction    Aspiration pneumonia of right lower lobe due to vomit (HCC)    MRSA carrier    Urinary retention    Abdominal distention    Elevated CEA    Elevated CA 19-9 level    Cholecystitis    CRP elevated    Elevated procalcitonin    Bandemia    Centrilobular emphysema (HCC)    Hemorrhage of rectum and anus    GI bleed    Anemia    Small bowel obstruction (HCC)    Anxiety disorder    Noncompliance  Resolved Problems:    * No resolved hospital problems. *         Condition       [x] high risk ,   [] septic ,   [x] confused ,  [] delirium      [x] ill ,   [x] critical        -----     Hemodynamic status  . ..... [x] stable    [] borderline -improving   [] unstable                             -----   Ventilatory  status  . Radha Never On   [x] oxygen Rx  ,    [] on bipap ,   [] on ventilator        NUTRITION:     [] NPO [] Tube Feeding  [] TPN  [x] PO                    Diet: DIET CLEAR LIQUID;  PN-Adult 2-in-1 Central Line (Standard)  Dietary Nutrition Supplements: Clear Liquid Oral Supplement  PN-Adult 2-in-1 Central Line (Standard)    . ........... Fluid , electrolyte therapy  ;  ....... Radha Never Continuous Infusions:      PN-Adult 2-in-1 Central Line (Standard)      PN-Adult 2-in-1 Central Line (Standard) 65 mL/hr at 05/22/19 1748    dextrose      lactated ringers 10 mL/hr at 05/17/19 1917   . Radha Yadav   Drug therapy ;  Scheduled Meds:      [START ON 5/24/2019] levothyroxine  50 mcg Oral Daily    mirtazapine  7.5 mg Oral Nightly    ipratropium-albuterol  1 ampule Inhalation 4x daily    amiodarone  200 mg Oral BID    enoxaparin  30 mg Subcutaneous Daily    metoprolol  2.5 mg Intravenous Q8H    fat emulsion  100 mL Intravenous Daily    insulin lispro  0-6 Units Subcutaneous Q6H    sodium chloride flush  10 mL Intravenous 2 times per day    pantoprazole  40 mg Intravenous Daily    And    sodium chloride (PF)  10 mL Intravenous Daily    alteplase  1 mg Intracatheter Once    mometasone-formoterol  2 puff Inhalation BID           --- Patient has passed the swallowing study  We are going to start her on oral diet and advance as tolerated  She has poor appetite and will add Remeron 7.5 mg daily at at bedtime--     Consultation  Reviewed ,  .    [] Cardiology           [] Nephrology  []. Hemo onco    [] GI    [x] ID      [] Pulmonary      [] Neuro                                  [] ---         Following input noted ;   Ecoli Emphysematous cystitis initially treatedtreated     Aspiration pneumonia of right lower lobe initially     Yeast growth on sputum culture suspect contamination     MRSA carrier    Urinary retention     Anemia GI bleed followed by GI     PCN allergy      History of CVA with left hemiparesis                  Recommendations:      D/C IV meropenem  . Follow blood cultures from 5/16 negative to date . Consultation  Reviewed ,  .    [] Cardiology           [] Nephrology  []. Hemo onco    [] GI    [] ID      [x] Pulmonary      [] Neuro                                  [] ---         Following input noted ; Acute hypoxic respiratory failure-resolved  Status post laparotomy with right colectomy and open cystectomy  A. fib with RVR new onset-in NSR now  History of treated aspiration pneumonia  Severe COPD  Bilateral pleural effusions left greater than right           Neuro   Still moans but mental status is gradually improving     Respiratory   Patient now on room air  Can make aerosols 4 times a day     Cardiovascular   On oral amiodarone now     Gastrointestinal   Extremely poor oral intake  Remains on TPN     Renal    May need intermittent diuresis, although BUN/creatinine ratio is elevated currently                                                            Ale[facility]    5/23/19    YEE STRONG [address], [address] [place] [address].

## 2019-05-23 NOTE — PLAN OF CARE
Problem: Risk for Impaired Skin Integrity  Goal: Tissue integrity - skin and mucous membranes  Description  Structural intactness and normal physiological function of skin and  mucous membranes. 5/23/2019 1514 by Irena Orozco RN  Outcome: Met This Shift  1. Skin integrity assessed and maintained PRN   2. Repositioned every 2 hours and PRN  3. Barrier cream applied as needed      Problem: Falls - Risk of:  Goal: Will remain free from falls  Description  Will remain free from falls  5/23/2019 1514 by Irena Orozco RN  Outcome: Met This Shift  1. Pt remains free from falls. 2. Bed in lowest position. 3. Call light within reach. 4. Hourly rounds continue. Problem: Falls - Risk of:  Goal: Absence of physical injury  Description  Absence of physical injury  Outcome: Met This Shift  1. Pt remains free from accidental injuries. 2. Fall precautions in place. 3. Bed in low position, wheels locked, and call light within reach. 4. ID band in place and checked frequently. Problem: Pain:  Goal: Pain level will decrease  Description  Pain level will decrease  5/23/2019 1514 by Irena Orozco RN  Outcome: Met This Shift  1. Denies all c/o pain this shift. 2. Educated on pain rating scale  3. Non-pharmacologic pain interventions discussed  4.  Rest and relaxation techniques explored

## 2019-05-23 NOTE — PROGRESS NOTES
Nutrition Assessment (Parenteral Nutrition)    Type and Reason for Visit: Reassess    Nutrition Recommendations: Continue TPN with the following adjustments to additives: NaCl: 65 to 86 mEq, KCl: 50 to 10 mEq, Calcium Gluconate: 4 to 6 mEq, remove MVI. Continue 100 mL lipids daily. Continue current diet and oral nutrition supplements,       Nutrition Assessment: Pt stable from a nutritional viewpoint with nutrition support. No significant oral intake. Pt complains of abdominal pain and nausea at times. TPN and lipids to continue with adjustments to additives, Continue to monitor nutritioin progression. Malnutrition Assessment:  · Malnutrition Status: At risk for malnutrition  · Context: Acute illness or injury  · Findings of the 6 clinical characteristics of malnutrition (Minimum of 2 out of 6 clinical characteristics is required to make the diagnosis of moderate or severe Protein Calorie Malnutrition based on AND/ASPEN Guidelines):  1. Energy Intake-Greater than 75% of estimated energy requirement, (Variable at times; over 75% of needs met for majority of admission)    2. Weight Loss-Unable to assess(edema present),    3. Fat Loss-Unable to assess(Pt is thin; edema present; no known recent loss of muscle or fat),    4. Muscle Loss-Unable to assess,    5. Fluid Accumulation-Moderate to severe fluid accumulation, Extremities, Generalized  6.  Strength-Not measured    Nutrition Risk Level: High    Nutrient Needs:  · Estimated Daily Total Kcal: 6741-0014 based on 35-39 kcal per kg of usual body wt  · Estimated Daily Protein (g): 63-68 based on 1.4-1.5 gm/kg IBW(revised)    Nutrition Diagnosis:   · Problem: Inadequate oral intake  · Etiology: related to Alteration in GI function, Insufficient energy/nutrient consumption     Signs and symptoms:  as evidenced by Intake 0-25%    Objective Information:  · Nutrition-Focused Physical Findings: Cramping, abdominal pain, nausea at times.  Edema: +1 pitting RUE, +3 pitting LUE. · Wound Type: Deep Tissue Injury, Multiple, Pressure Ulcer, Stage I, Stage II, Surgical Wound  · Current Nutrition Therapies:  · Oral Diet Orders: Clear Liquid   · Oral Diet intake: 1-25%  · Oral Nutrition Supplement (ONS) Orders: Clear Liquid Oral Supplement  · ONS intake: 1-25%  · Parenteral Nutrition Orders:  · Type and Formula: Premix Central, 2-in-1 Custom   · Lipids: 100ml  · Rate/Volume: 65/1560 ml daily  · Duration: Continuous  · Current PN Order Provides: 1573 kcal, 78 gm pro  · Goal PN Orders Provides: Total nutrition support goal: 4475-7824 kcal, 63-68 gm protein  · Anthropometric Measures:  · Ht: 5' (152.4 cm)   · Current Body Wt: 102 lb (46.3 kg)  · Admission Body Wt: 102 lb (46.3 kg)  · Usual Body Wt: 89 lb 1.1 oz (40.4 kg)(3-30-19)  · Ideal Body Wt: 100 lb (45.4 kg), % Ideal Body 89% (based on wt 5-12)  · BMI Classification: BMI 18.5 - 24.9 Normal Weight    Nutrition Interventions:   Continue current diet, Continue current ONS, Modify current Parenteral Nutrition  Continued Inpatient Monitoring    Nutrition Evaluation:   · Evaluation: Progressing toward goals   · Goals: Adequate nutritional intake or provision    · Monitoring: Nutrition Progression, Meal Intake, Supplement Intake, PN Intake, Diet Tolerance, Weight, Pertinent Labs, Wound Healing, I&O, Monitor Hemodynamic Status, Chewing/Swallowing    Cherry Melgoza R.D., L.D.   Phone: [phone]

## 2019-05-23 NOTE — PROGRESS NOTES
ICU Progress Note (Non-Vent)  [facility]    Patient - Erin Damon,  Age - 77 y.o.    - 1950      Room Number -    MRN -  [id number]   Acct # - [de-identified]  Date of Admission -  2019  2:48 PM    Events of Past 24 Hours   She continues to improve in terms of alertness    Vitals    height is 5' (1.524 m) and weight is 102 lb 4.7 oz (46.4 kg). Her axillary temperature is 97.8 °F (36.6 °C). Her blood pressure is 95/39 (abnormal) and her pulse is 101. Her respiration is 25 and oxygen saturation is 98%.        Temperature Range: Temp: 97.8 °F (36.6 °C) Temp  Av.3 °F (36.8 °C)  Min: 97.8 °F (36.6 °C)  Max: 98.8 °F (37.1 °C)  BP Range:  Systolic (54PTO), FMS:951 , Min:81 , AZZ:881     Diastolic (91XKU), DIT:68, Min:39, Max:70    Pulse Range: Pulse  Av.9  Min: 91  Max: 110  Respiration Range: Resp  Av.9  Min: 14  Max: 26  Current Pulse Ox[de-identified]  SpO2: 98 %  24HR Pulse Ox Range:  SpO2  Av.4 %  Min: 93 %  Max: 99 %  Oxygen Amount and Delivery: O2 Flow Rate (L/min): 2 L/min    Wt Readings from Last 3 Encounters:   19 102 lb 4.7 oz (46.4 kg)   19 89 lb (40.4 kg)   19 94 lb 1.6 oz (42.7 kg)     I/O       Intake/Output Summary (Last 24 hours) at 2019 1247  Last data filed at 2019 0800  Gross per 24 hour   Intake 1810.58 ml   Output 2090 ml   Net -279.42 ml     DRAIN/TUBE OUTPUT       Invasive Lines   ICP PRESSURE RANGE  No data recorded  CVP PRESSURE RANGE  No data recorded      Medications      [START ON 2019] levothyroxine  50 mcg Oral Daily    mirtazapine  7.5 mg Oral Nightly    amiodarone  200 mg Oral BID    enoxaparin  30 mg Subcutaneous Daily    metoprolol  2.5 mg Intravenous Q8H    ipratropium-albuterol  1 ampule Inhalation Q4H    fat emulsion  100 mL Intravenous Daily    insulin lispro  0-6 Units Subcutaneous Q6H    sodium chloride flush  10 mL Intravenous 2 times per day    pantoprazole  40 mg Intravenous Daily    And    sodium chloride (PF)  10 mL Intravenous Daily    alteplase  1 mg Intracatheter Once    mometasone-formoterol  2 puff Inhalation BID     fentanNYL, LORazepam, glucose, dextrose, glucagon (rDNA), dextrose, sodium phosphate IVPB **OR** sodium phosphate IVPB, sodium chloride flush, ondansetron, potassium chloride, magnesium sulfate  IV Drips/Infusions   PN-Adult 2-in-1 Central Line (Standard) 65 mL/hr at 05/22/19 1748    dextrose      lactated ringers 10 mL/hr at 05/17/19 7421       Diet/Nutrition   DIET CLEAR LIQUID;  PN-Adult 2-in-1 Central Line (Standard)  Dietary Nutrition Supplements: Clear Liquid Oral Supplement    Exam      Constitutional - Alert, arousable  General Appearance  thin and frail   HEENT -normocephalic, atraumatic. PERRLA  Lungs - Chest expands equally, no wheezes, rales or rhonchi. Cardiovascular - Heart sounds are normal.  normal rate and rhythm regular, no murmur, gallop or rub. Abdomen - soft, nontender, nondistended, no masses or organomegaly  Neurologic - CN II-XII are grossly intact.  There are no focal motor deficits  Skin - no bruising or bleeding  Extremities - no cyanosis, clubbing or edema    Lab Results   CBC     Lab Results   Component Value Date    WBC 18.9 05/23/2019    RBC 3.92 05/23/2019    RBC 3.66 05/23/2012    HGB 11.3 05/23/2019    HCT 34.5 05/23/2019     05/23/2019     05/23/2012    MCV 88.0 05/23/2019    MCH 28.8 05/23/2019    MCHC 32.8 05/23/2019    RDW 16.7 05/23/2019    METASPCT 2 05/15/2019    LYMPHOPCT 12 05/23/2019    MONOPCT 8 05/23/2019    MYELOPCT 1 05/07/2019    BASOPCT 0 05/23/2019    MONOSABS 1.51 05/23/2019    LYMPHSABS 2.27 05/23/2019    EOSABS 0.19 05/23/2019    BASOSABS 0.00 05/23/2019    DIFFTYPE NOT REPORTED 05/23/2019       BMP   Lab Results   Component Value Date     05/23/2019    K 4.7 05/23/2019    CL 98 05/23/2019    CO2 27 05/23/2019    BUN 38 05/23/2019    CREATININE <0.40 05/23/2019    GLUCOSE 100 05/23/2019    GLUCOSE 87 05/21/2012       LFTS  Lab Results   Component Value Date    ALKPHOS 62 05/18/2019    ALT 11 05/18/2019    AST 13 05/18/2019    PROT 5.2 05/18/2019    BILITOT 0.37 05/18/2019    BILIDIR 0.21 05/11/2019    IBILI 0.17 05/11/2019    LABALBU 3.5 05/18/2019    LABALBU 4.6 05/17/2012       ABG ABGs:   Lab Results   Component Value Date    PHART 7.487 05/20/2019    PO2ART 62.2 05/20/2019    XPM8TOC 48.9 05/20/2019       Lab Results   Component Value Date    MODE PRVC 05/20/2019         INR  Recent Labs     05/21/19 0417 05/22/19  0451   PROTIME 16.1* 15.0*   INR 1.3 1.2       APTT  Recent Labs     05/21/19 0417 05/22/19 0451   APTT 29.7 31.9       Lactic Acid  Lab Results   Component Value Date    LACTA 1.9 05/16/2019    LACTA 1.8 05/12/2019    LACTA 0.9 05/11/2019        BNP   No results for input(s): BNP in the last 72 hours.      Cultures       Radiology     CXR      Chest x-ray shows bibasilar atelectasis and effusions      SYSTEMS ASSESSMENT    Acute hypoxic respiratory failure-resolved  Status post laparotomy with right colectomy and open cystectomy  A. fib with RVR new onset-in NSR now  History of treated aspiration pneumonia  Severe COPD  Bilateral pleural effusions left greater than right        Neuro   Still moans but mental status is gradually improving    Respiratory   Patient now on room air  Can make aerosols 4 times a day    Cardiovascular   On oral amiodarone now     Gastrointestinal   Extremely poor oral intake  Remains on TPN    Renal    May need intermittent diuresis, although BUN/creatinine ratio is elevated currently    Infectious Disease   Off antibiotics    Hematology/Oncology   On DVT prophylaxis and hemoglobin is stable    Endocrine   Blood sugars are okay even on TPN    Transfer out of ICU    Critical Care Time   0 min    Electronically signed by Yulia Dixon MD on 5/23/2019 at 12:47 PM

## 2019-05-23 NOTE — FLOWSHEET NOTE
05/23/19 150 [facility]   Oncoming Nurse/Offgoing Nurse Dayan/Veronica   Handoff Communication Telephone   Time Handoff Given 5659   End of Shift Check Performed Yes     Pt transferred to PCU room 2099 at this time. Connected to monitor. Oncoming nurse notified.  Electronically signed by Alvaro Currie RN on 5/23/2019 at 3:12 PM

## 2019-05-23 NOTE — PROGRESS NOTES
[facility]   Progress Note                   Date:   5/23/2019  Patient name: Lanie Paget  Date of admission:  4/11/2019  2:48 PM  MRN:   [id number]  YOB: 1950  PCP: Griffin Hester DO    Reason for Admission:     Subjective:       Clinical Changes / Abnormalities: PT DENIES ANY SPECIFIC SYMPTOMS , REMAINED IN NSR       Medications:   Scheduled Meds:   [START ON 5/24/2019] levothyroxine  50 mcg Oral Daily    amiodarone  200 mg Oral BID    enoxaparin  30 mg Subcutaneous Daily    metoprolol  2.5 mg Intravenous Q8H    ipratropium-albuterol  1 ampule Inhalation Q4H    fat emulsion  100 mL Intravenous Daily    insulin lispro  0-6 Units Subcutaneous Q6H    sodium chloride flush  10 mL Intravenous 2 times per day    pantoprazole  40 mg Intravenous Daily    And    sodium chloride (PF)  10 mL Intravenous Daily    alteplase  1 mg Intracatheter Once    meropenem  1 g Intravenous Q8H    mometasone-formoterol  2 puff Inhalation BID     Continuous Infusions:   PN-Adult 2-in-1 Central Line (Standard) 65 mL/hr at 05/22/19 1748    dextrose      lactated ringers 10 mL/hr at 05/17/19 1917     CBC:   Recent Labs     05/21/19 0417 05/22/19 0451 05/22/19 1958 05/23/19  0430   WBC 20.7*  --  18.7*  --  18.9*   HGB 10.9*   < > 11.0* 12.2 11.3*     --  246  --  235    < > = values in this interval not displayed. BMP:    Recent Labs     05/21/19 0417 05/22/19 0451 05/23/19  0430    135 133*   K 4.4 4.6 4.7   CL 97* 95* 98   CO2 32* 31 27   BUN 27* 33* 38*   CREATININE <0.40* <0.40* <0.40*   GLUCOSE 91 97 100*     Hepatic: No results for input(s): AST, ALT, ALB, BILITOT, ALKPHOS in the last 72 hours. Troponin: No results for input(s): TROPONINI in the last 72 hours. BNP: No results for input(s): BNP in the last 72 hours. Lipids: No results for input(s): CHOL, HDL in the last 72 hours.     Invalid input(s): LDLCALCU  INR:   Recent Labs     05/21/19  0419 05/22/19  0451   INR 1.3 1.2       Objective:   Vitals: BP (!) 95/39   Pulse 101   Temp 97.8 °F (36.6 °C) (Axillary)   Resp 21   Ht 5' (1.524 m)   Wt 102 lb 4.7 oz (46.4 kg)   SpO2 96%   BMI 19.98 kg/m²   I/O last 3 completed shifts: In: 1810.6 [IV Piggyback:100]  Out: 2150 [Urine:2000; Drains:150]  No intake/output data recorded. Scheduled Meds:   [START ON 5/24/2019] levothyroxine  50 mcg Oral Daily    amiodarone  200 mg Oral BID    enoxaparin  30 mg Subcutaneous Daily    metoprolol  2.5 mg Intravenous Q8H    ipratropium-albuterol  1 ampule Inhalation Q4H    fat emulsion  100 mL Intravenous Daily    insulin lispro  0-6 Units Subcutaneous Q6H    sodium chloride flush  10 mL Intravenous 2 times per day    pantoprazole  40 mg Intravenous Daily    And    sodium chloride (PF)  10 mL Intravenous Daily    alteplase  1 mg Intracatheter Once    meropenem  1 g Intravenous Q8H    mometasone-formoterol  2 puff Inhalation BID     Continuous Infusions:   PN-Adult 2-in-1 Central Line (Standard) 65 mL/hr at 05/22/19 1748    dextrose      lactated ringers 10 mL/hr at 05/17/19 1917     PRN Meds:.fentanNYL, LORazepam, glucose, dextrose, glucagon (rDNA), dextrose, sodium phosphate IVPB **OR** sodium phosphate IVPB, sodium chloride flush, ondansetron, potassium chloride, magnesium sulfate    CONSTITUTIONAL: AOx4, no apparent distress, appears stated age   HEAD: normocephalic, atraumatic   EYES: PERRLA, EOMI   ENT: moist mucous membranes, uvula midline   NECK: symmetric, no midline tenderness to palpation   LUNGS: clear to auscultation bilaterally   CARDIOVASCULAR: regular rate and rhythm, no murmurs, rubs or gallops   ABDOMEN: Soft, non-tender, surgical dressing    SKIN: no rash       EKG: afib     ECHO: 4/12/19  Small LV cavity. Normal LV wall thickness. Mildly decreased Left ventricular systolic function. Estimated LV EF 45 %. Hypokinetic basal and mid segments. Hyperkinetic apex.   Evidence of diastolic dysfunction. Mild tricuspid regurgitation. Estimated right ventricular systolic pressure  is 44LRCU. Stress Test: not obtained. Cardiac Angiography: not obtained.             Assessment / Acute Cardiac Problems:       Patient Active Problem List:     Paraproteinemia     CVA (cerebral vascular accident) ([address]     Abnormal computed tomography of cervical spine     Weight loss     Functional gait abnormality     Left knee pain     Knee pain, left     Acute pain of left knee     Sepsis due to urinary tract infection ([address]     Cystitis     Emphysematous cystitis     Septic shock (HCC)     Leukemoid reaction     Aspiration pneumonia of right lower lobe due to vomit (HCC)     MRSA carrier     Urinary retention     Abdominal distention     Elevated CEA     Elevated CA 19-9 level     Cholecystitis     CRP elevated     Elevated procalcitonin     Bandemia     Centrilobular emphysema (HCC)     Hemorrhage of rectum and anus     GI bleed     Anemia     Small bowel obstruction (HCC)     Anxiety disorder     Noncompliance     Acute respiratory failure ([facility]      Plan of Treatment:     PAROXYSMAL AFIB CONVERTED TO NSR   CONTINUE AMIODARONE NOW   HTN ON LOWER SIDE   LOW LVEF, WITH SEGMENTAL ABNORMALITIES  NEEDS ISCHEMIA WORK UP AS OUT PT   CONTINUE REST   NEEDS EKG TO DOCUMENT NSR   Marcos Clark [facility] Cardiology  [phone]

## 2019-05-23 NOTE — FLOWSHEET NOTE
Pt started on clear liquid diet earlier in shift. Attempted feeding Pt. Pt drank a few sips of juice and coffee, ate a couple bites of jello, and refused chicken broth stating \"it'll just come right back up\". Will continue assisting Pt with meals and encouraging her to eat and drink.  Electronically signed by Perlita Forde RN on 5/23/2019 at 9:37 AM

## 2019-05-23 NOTE — FLOWSHEET NOTE
05/23/19 1420   Encounter Summary   Services provided to: Patient   Referral/Consult From: Palliative Care   Continue Visiting   (5/23/19)   Complexity of Encounter Low   Length of Encounter 15 minutes   Routine   Type Follow up   Assessment Approachable; Anxious   Intervention Active listening;Explored feelings, thoughts, concerns;[place];Sustaining presence/ Ministry of presence   Outcome Expressed feelings/needs/concerns  (just wants coffee)

## 2019-05-23 NOTE — PROGRESS NOTES
Attempted to feed pt d/t changing diet to full liquid, pt swallowed 1 bite with no difficulty, pt refused anymore food or liquids at this time

## 2019-05-23 NOTE — PROGRESS NOTES
Problems:    Sepsis due to urinary tract infection (HCC)    Cystitis    Emphysematous cystitis    Septic shock (HCC)    Leukemoid reaction    Aspiration pneumonia of right lower lobe due to vomit (HCC)    MRSA carrier    Urinary retention    Abdominal distention    Elevated CEA    Elevated CA 19-9 level    Cholecystitis    CRP elevated    Elevated procalcitonin    Bandemia    Centrilobular emphysema (HCC)    Hemorrhage of rectum and anus    GI bleed    Anemia    Small bowel obstruction (HCC)    Anxiety disorder    Noncompliance  Resolved Problems:    * No resolved hospital problems. *    S/P right colectomy, cholecystectomy  Up as tolerated  Will advance to full liquids  PT/OT  Off antibiotics.  Will monitor labs        Audrey Stephens, [address]

## 2019-05-23 NOTE — CARE COORDINATION
ONGOING DISCHARGE PLAN:    LSW following for discharge planning to [facility]. LSW has asked LTACH to start the pre-cert today. Active orders for duoneb, dulera, and TPN/LIpids. POD #8. Will continue to follow for additional discharge needs.     Electronically signed by Macario Post RN on 5/23/2019 at 12:29 PM

## 2019-05-23 NOTE — PLAN OF CARE
Problem: Falls - Risk of:  Goal: Absence of physical injury  Description  Absence of physical injury  5/22/2019 1825 by Lyndon Adam RN  Outcome: Met This Shift     Problem: Risk for Impaired Skin Integrity  Goal: Tissue integrity - skin and mucous membranes  Description  Structural intactness and normal physiological function of skin and  mucous membranes. 5/23/2019 0553 by Isael Cedeño RN  Outcome: Ongoing  Note:   Patient turned and repositioned every 2 hours and as needed for comfort. Skin remains dry and intact. No new skin breakdown noted. 5/22/2019 1825 by Lyndon Adam RN  Outcome: Met This Shift     Problem: Falls - Risk of:  Goal: Will remain free from falls  Description  Will remain free from falls  5/23/2019 0553 by Isael Cedeño RN  Outcome: Ongoing  5/22/2019 1825 by Lyndon Adam RN  Outcome: Met This Shift     Problem: Pain:  Goal: Pain level will decrease  Description  Pain level will decrease  5/23/2019 0553 by Isael Cedeño RN  Outcome: Ongoing  Note:   Pain assessed at regular intervals. Medications administered as requested for comfort. Pain remains at a tolerable level.    5/22/2019 1825 by Lyndon Adam RN  Outcome: Met This Shift     Problem: Nutrition  Goal: Optimal nutrition therapy  Description       Outcome: Ongoing

## 2019-05-23 NOTE — PLAN OF CARE
Nutrition Problem: Inadequate oral intake  Intervention: Food and/or Nutrient Delivery: Continue current diet, Continue current ONS, Modify current Parenteral Nutrition  Nutritional Goals: Adequate nutritional intake or provision

## 2019-05-24 PROBLEM — J44.9 CHRONIC OBSTRUCTIVE PULMONARY DISEASE, UNSPECIFIED (HCC): Chronic | Status: ACTIVE | Noted: 2019-05-24

## 2019-05-24 LAB
ABSOLUTE BANDS #: 0.29 K/UL (ref 0–1)
ABSOLUTE EOS #: 0.15 K/UL (ref 0–0.4)
ABSOLUTE IMMATURE GRANULOCYTE: ABNORMAL K/UL (ref 0–0.3)
ABSOLUTE LYMPH #: 1.62 K/UL (ref 1–4.8)
ABSOLUTE MONO #: 0.44 K/UL (ref 0.1–1.3)
ANION GAP SERPL CALCULATED.3IONS-SCNC: 8 MMOL/L (ref 9–17)
BANDS: 2 % (ref 0–10)
BASOPHILS # BLD: 0 % (ref 0–2)
BASOPHILS ABSOLUTE: 0 K/UL (ref 0–0.2)
BUN BLDV-MCNC: 38 MG/DL (ref 8–23)
BUN/CREAT BLD: ABNORMAL (ref 9–20)
CALCIUM SERPL-MCNC: 8 MG/DL (ref 8.6–10.4)
CHLORIDE BLD-SCNC: 102 MMOL/L (ref 98–107)
CO2: 22 MMOL/L (ref 20–31)
CREAT SERPL-MCNC: <0.4 MG/DL (ref 0.5–0.9)
DIFFERENTIAL TYPE: ABNORMAL
EOSINOPHILS RELATIVE PERCENT: 1 % (ref 0–4)
GFR AFRICAN AMERICAN: ABNORMAL ML/MIN
GFR NON-AFRICAN AMERICAN: ABNORMAL ML/MIN
GFR SERPL CREATININE-BSD FRML MDRD: ABNORMAL ML/MIN/{1.73_M2}
GFR SERPL CREATININE-BSD FRML MDRD: ABNORMAL ML/MIN/{1.73_M2}
GLUCOSE BLD-MCNC: 101 MG/DL (ref 65–105)
GLUCOSE BLD-MCNC: 101 MG/DL (ref 65–105)
GLUCOSE BLD-MCNC: 106 MG/DL (ref 65–105)
GLUCOSE BLD-MCNC: 112 MG/DL (ref 65–105)
GLUCOSE BLD-MCNC: 115 MG/DL (ref 70–99)
GLUCOSE BLD-MCNC: 99 MG/DL (ref 65–105)
GLUCOSE BLD-MCNC: 99 MG/DL (ref 65–105)
HCT VFR BLD CALC: 32.2 % (ref 36–46)
HEMOGLOBIN: 10.6 G/DL (ref 12–16)
IMMATURE GRANULOCYTES: ABNORMAL %
LYMPHOCYTES # BLD: 11 % (ref 24–44)
MAGNESIUM: 2 MG/DL (ref 1.6–2.6)
MCH RBC QN AUTO: 28.5 PG (ref 26–34)
MCHC RBC AUTO-ENTMCNC: 32.9 G/DL (ref 31–37)
MCV RBC AUTO: 86.8 FL (ref 80–100)
MONOCYTES # BLD: 3 % (ref 1–7)
MORPHOLOGY: ABNORMAL
MORPHOLOGY: ABNORMAL
MYELOCYTES ABSOLUTE COUNT: 0.15 K/UL
MYELOCYTES: 1 %
NRBC AUTOMATED: ABNORMAL PER 100 WBC
PDW BLD-RTO: 16.4 % (ref 11.5–14.9)
PHOSPHORUS: 3.3 MG/DL (ref 2.6–4.5)
PLATELET # BLD: 234 K/UL (ref 150–450)
PLATELET ESTIMATE: ABNORMAL
PMV BLD AUTO: 9.1 FL (ref 6–12)
POTASSIUM SERPL-SCNC: 4.8 MMOL/L (ref 3.7–5.3)
RBC # BLD: 3.71 M/UL (ref 4–5.2)
RBC # BLD: ABNORMAL 10*6/UL
SEG NEUTROPHILS: 82 % (ref 36–66)
SEGMENTED NEUTROPHILS ABSOLUTE COUNT: 12.05 K/UL (ref 1.3–9.1)
SODIUM BLD-SCNC: 132 MMOL/L (ref 135–144)
TRIGL SERPL-MCNC: 128 MG/DL
WBC # BLD: 14.7 K/UL (ref 3.5–11)
WBC # BLD: ABNORMAL 10*3/UL

## 2019-05-24 PROCEDURE — 6360000002 HC RX W HCPCS: Performed by: SURGERY

## 2019-05-24 PROCEDURE — 99232 SBSQ HOSP IP/OBS MODERATE 35: CPT | Performed by: INTERNAL MEDICINE

## 2019-05-24 PROCEDURE — 82947 ASSAY GLUCOSE BLOOD QUANT: CPT

## 2019-05-24 PROCEDURE — 36415 COLL VENOUS BLD VENIPUNCTURE: CPT

## 2019-05-24 PROCEDURE — 83735 ASSAY OF MAGNESIUM: CPT

## 2019-05-24 PROCEDURE — 6370000000 HC RX 637 (ALT 250 FOR IP): Performed by: SURGERY

## 2019-05-24 PROCEDURE — 84478 ASSAY OF TRIGLYCERIDES: CPT

## 2019-05-24 PROCEDURE — 94761 N-INVAS EAR/PLS OXIMETRY MLT: CPT

## 2019-05-24 PROCEDURE — 6360000002 HC RX W HCPCS: Performed by: INTERNAL MEDICINE

## 2019-05-24 PROCEDURE — 6370000000 HC RX 637 (ALT 250 FOR IP): Performed by: STUDENT IN AN ORGANIZED HEALTH CARE EDUCATION/TRAINING PROGRAM

## 2019-05-24 PROCEDURE — 2500000003 HC RX 250 WO HCPCS: Performed by: STUDENT IN AN ORGANIZED HEALTH CARE EDUCATION/TRAINING PROGRAM

## 2019-05-24 PROCEDURE — 94640 AIRWAY INHALATION TREATMENT: CPT

## 2019-05-24 PROCEDURE — 6370000000 HC RX 637 (ALT 250 FOR IP): Performed by: INTERNAL MEDICINE

## 2019-05-24 PROCEDURE — 99223 1ST HOSP IP/OBS HIGH 75: CPT | Performed by: INTERNAL MEDICINE

## 2019-05-24 PROCEDURE — 80048 BASIC METABOLIC PNL TOTAL CA: CPT

## 2019-05-24 PROCEDURE — 2060000000 HC ICU INTERMEDIATE R&B

## 2019-05-24 PROCEDURE — 85025 COMPLETE CBC W/AUTO DIFF WBC: CPT

## 2019-05-24 PROCEDURE — 2500000003 HC RX 250 WO HCPCS: Performed by: SURGERY

## 2019-05-24 PROCEDURE — 2580000003 HC RX 258: Performed by: SURGERY

## 2019-05-24 PROCEDURE — C9113 INJ PANTOPRAZOLE SODIUM, VIA: HCPCS | Performed by: SURGERY

## 2019-05-24 PROCEDURE — 84100 ASSAY OF PHOSPHORUS: CPT

## 2019-05-24 PROCEDURE — 6360000002 HC RX W HCPCS: Performed by: STUDENT IN AN ORGANIZED HEALTH CARE EDUCATION/TRAINING PROGRAM

## 2019-05-24 RX ORDER — MORPHINE SULFATE 4 MG/ML
4 INJECTION, SOLUTION INTRAMUSCULAR; INTRAVENOUS
Status: DISCONTINUED | OUTPATIENT
Start: 2019-05-24 | End: 2019-05-25

## 2019-05-24 RX ORDER — MORPHINE SULFATE 2 MG/ML
2 INJECTION, SOLUTION INTRAMUSCULAR; INTRAVENOUS
Status: DISCONTINUED | OUTPATIENT
Start: 2019-05-24 | End: 2019-05-25

## 2019-05-24 RX ORDER — OXYCODONE HYDROCHLORIDE AND ACETAMINOPHEN 5; 325 MG/1; MG/1
1 TABLET ORAL EVERY 4 HOURS PRN
Status: DISCONTINUED | OUTPATIENT
Start: 2019-05-24 | End: 2019-05-25

## 2019-05-24 RX ORDER — OXYCODONE HYDROCHLORIDE AND ACETAMINOPHEN 5; 325 MG/1; MG/1
2 TABLET ORAL EVERY 4 HOURS PRN
Status: DISCONTINUED | OUTPATIENT
Start: 2019-05-24 | End: 2019-05-25

## 2019-05-24 RX ORDER — AMIODARONE HYDROCHLORIDE 100 MG/1
100 TABLET ORAL 2 TIMES DAILY
Status: DISCONTINUED | OUTPATIENT
Start: 2019-05-24 | End: 2019-05-31 | Stop reason: HOSPADM

## 2019-05-24 RX ADMIN — METOPROLOL TARTRATE 2.5 MG: 1 INJECTION, SOLUTION INTRAVENOUS at 21:30

## 2019-05-24 RX ADMIN — LORAZEPAM 2 MG: 2 INJECTION INTRAMUSCULAR; INTRAVENOUS at 02:05

## 2019-05-24 RX ADMIN — Medication 2 PUFF: at 21:30

## 2019-05-24 RX ADMIN — IPRATROPIUM BROMIDE AND ALBUTEROL SULFATE 1 AMPULE: .5; 3 SOLUTION RESPIRATORY (INHALATION) at 07:45

## 2019-05-24 RX ADMIN — IPRATROPIUM BROMIDE AND ALBUTEROL SULFATE 1 AMPULE: .5; 3 SOLUTION RESPIRATORY (INHALATION) at 12:05

## 2019-05-24 RX ADMIN — OXYCODONE HYDROCHLORIDE AND ACETAMINOPHEN 2 TABLET: 5; 325 TABLET ORAL at 18:49

## 2019-05-24 RX ADMIN — MORPHINE SULFATE 4 MG: 4 INJECTION INTRAVENOUS at 21:30

## 2019-05-24 RX ADMIN — OXYCODONE HYDROCHLORIDE AND ACETAMINOPHEN 2 TABLET: 5; 325 TABLET ORAL at 08:53

## 2019-05-24 RX ADMIN — FENTANYL CITRATE 50 MCG: 50 INJECTION INTRAMUSCULAR; INTRAVENOUS at 05:35

## 2019-05-24 RX ADMIN — I.V. FAT EMULSION 100 ML: 20 EMULSION INTRAVENOUS at 18:49

## 2019-05-24 RX ADMIN — LEVOTHYROXINE SODIUM 50 MCG: 50 TABLET ORAL at 05:42

## 2019-05-24 RX ADMIN — PANTOPRAZOLE SODIUM 40 MG: 40 INJECTION, POWDER, FOR SOLUTION INTRAVENOUS at 16:09

## 2019-05-24 RX ADMIN — FENTANYL CITRATE 50 MCG: 50 INJECTION INTRAMUSCULAR; INTRAVENOUS at 03:26

## 2019-05-24 RX ADMIN — POTASSIUM CHLORIDE: 2 INJECTION, SOLUTION, CONCENTRATE INTRAVENOUS at 18:53

## 2019-05-24 RX ADMIN — ENOXAPARIN SODIUM 30 MG: 30 INJECTION SUBCUTANEOUS at 08:52

## 2019-05-24 RX ADMIN — AMIODARONE HYDROCHLORIDE 100 MG: 100 TABLET ORAL at 21:30

## 2019-05-24 RX ADMIN — Medication 10 ML: at 21:30

## 2019-05-24 RX ADMIN — LORAZEPAM 2 MG: 2 INJECTION INTRAMUSCULAR; INTRAVENOUS at 03:26

## 2019-05-24 RX ADMIN — METOPROLOL TARTRATE 2.5 MG: 1 INJECTION, SOLUTION INTRAVENOUS at 16:14

## 2019-05-24 RX ADMIN — Medication 10 ML: at 16:09

## 2019-05-24 RX ADMIN — AMIODARONE HYDROCHLORIDE 200 MG: 200 TABLET ORAL at 08:52

## 2019-05-24 ASSESSMENT — PAIN SCALES - GENERAL
PAINLEVEL_OUTOF10: 9
PAINLEVEL_OUTOF10: 9
PAINLEVEL_OUTOF10: 7
PAINLEVEL_OUTOF10: 7
PAINLEVEL_OUTOF10: 5
PAINLEVEL_OUTOF10: 7
PAINLEVEL_OUTOF10: 10

## 2019-05-24 ASSESSMENT — ENCOUNTER SYMPTOMS
ABDOMINAL PAIN: 1
WHEEZING: 0
SHORTNESS OF BREATH: 0

## 2019-05-24 NOTE — PROGRESS NOTES
Septic shock (HCC)    Leukemoid reaction    Aspiration pneumonia of right lower lobe due to vomit (HCC)    MRSA carrier    Urinary retention    Abdominal distention    Elevated CEA    Elevated CA 19-9 level    Cholecystitis    CRP elevated    Elevated procalcitonin    Bandemia    Centrilobular emphysema (HCC)    Hemorrhage of rectum and anus    GI bleed    Anemia    Small bowel obstruction (HCC)    Anxiety disorder    Noncompliance  Resolved Problems:    * No resolved hospital problems. *    S/P right colectomy, cholecystectomy    Plan  1. Will need to be taken back to OR for washout and placement of retention sutures vs wound vac  2. Continue antibiotics  3. Soft diet  4.  Up as tolerated        Mac Reyes DO [address]

## 2019-05-24 NOTE — PROGRESS NOTES
4/22 grew Candida non-albicans and one colony of ESBL Klebsiella. PICC line was placed   Tagged RBC scan  was negative . CT abdomen 5/5 showed no fluid collection ,Bowel obstruction which is suspected to be caused by an internal hernia. NG tube was placed under fluoroscopy 5/9. She had a small bowel follow-through 5/9, developed respiratory failure with hypoxia, tachycardia and tachypnea was transferred to ICU placed on BiPAP, blood pressure on the low side required Levophed,WBC up to 28.5    code status was changed to [facility] on 5/13. S/p right colectomy with ileocolic anastomosis,open cholecystectomy and excision of small bowel diverticulum 5/15 . Interval history :  She was extubated 5/20. No new complaints today  Temperature max 100  She denied  nausea or vomiting   She remains on TPN, she was started on clear liquid diet ,not eating much ,abdominal pain persists better . Past Medical History:   Diagnosis Date    Abnormal computed tomography of cervical spine     sclerotic bone appearance     CVA (cerebral vascular accident) ([address]     left  side weakness    GERD (gastroesophageal reflux disease)     Hypertension     Paraproteinemia     Weight loss       Past Surgical History:   Procedure Laterality Date    CHOLECYSTECTOMY N/A 5/15/2019    CHOLECYSTECTOMY performed by Pearletha Nissen, DO at [facility]  4/14/2019         LAPAROSCOPY N/A 5/15/2019    LAPAROSCOPY EXPLORATORY CONVERTED TO EXPLORATORY LAPAROTOMY/ RIGHT COLON RESECTION AND ANASTAMOSIS/ OPEN CHOLECYSTECTOMY/ EXTENSIVE LYSIS OF ADHESIONS performed by Pearletha Nissen, DO at [facility] 10  07/2011    Pacemaker is Medtronic Revo (compatible). Leads placed in 1995 are NOT MRI compatible. Placed at [facility]. V's per Dr. Clyde Reina can not have an MRI.     UPPER GASTROINTESTINAL ENDOSCOPY N/A 4/16/2019    EGD ESOPHAGOGASTRODUODENOSCOPY @ BEDSIDE  ICU 2002 performed by Anna Dietrich MD at [address] Admission Meds  No current facility-administered medications on file prior to encounter. Current Outpatient Medications on File Prior to Encounter   Medication Sig Dispense Refill    oxyCODONE-acetaminophen (PERCOCET) 5-325 MG per tablet Take 1 tablet by mouth every 6 hours as needed for Pain.  senna-docusate (PERICOLACE) 8.6-50 MG per tablet Take 1 tablet by mouth 2 times daily      budesonide-formoterol (SYMBICORT) 160-4.5 MCG/ACT AERO Inhale 2 puffs into the lungs 2 times daily      sucralfate (CARAFATE) 1 GM/10ML suspension Take 1 g by mouth 4 times daily       traZODone (DESYREL) 50 MG tablet Take 50 mg by mouth nightly      tiZANidine (ZANAFLEX) 2 MG tablet Take 2 mg by mouth every 8 hours as needed (left knee)       aspirin 81 MG tablet Take 81 mg by mouth daily      clopidogrel (PLAVIX) 75 MG tablet TAKE 1 TABLET DAILY 30 tablet 2    DOCQLACE 100 MG capsule TAKE 1 CAPSULE BY MOUTH IN THE MORNING & IN THE EVENING -DRINK PLENTYOF WATER WHILE TAKING THIS MEDICINE 30 capsule 1    amLODIPine (NORVASC) 10 MG tablet Take 10 mg by mouth daily.  LORazepam (ATIVAN) 0.5 MG tablet Take 0.5 mg by mouth every 6 hours as needed for Anxiety.  therapeutic multivitamin-minerals (THERAGRAN-M) tablet Take 1 tablet by mouth daily.  omeprazole (PRILOSEC) 20 MG capsule Take 20 mg by mouth daily.  venlafaxine (EFFEXOR) 37.5 MG tablet Take 37.5 mg by mouth 3 times daily.  Misc. Devices [facility] [address] [place] wheelchair with left fupper extremity support  Dx: stroke with left hemiparesis.  1 each 0           Allergies  Allergies   Allergen Reactions    Penicillins Shortness Of Breath and Rash    Amoxicillin         Social   Social History     Tobacco Use    Smoking status: Current Some Day Smoker     Packs/day: 1.00     Years: 30.00     Pack years: 30.00    Smokeless tobacco: Never Used   Substance Use Topics    Alcohol use: Not Currently     Alcohol/week: 16.8 oz     Types: 28 Glasses of wine per week                History reviewed. No pertinent family history. Review of Systems  Other than above 10 systems reviewed negative    Tolerating antibiotics. Physical Exam  BP (!) 100/54   Pulse 106   Temp 98.4 °F (36.9 °C) (Oral)   Resp 18   Ht 5' (1.524 m)   Wt 102 lb 4.7 oz (46.4 kg)   SpO2 96%   BMI 19.98 kg/m²           General Appearance: Awake, oriented ,not under distress  . Skin: warm and dry, no rash or erythema  Head: normocephalic and atraumatic  Eyes: pupils equal, round  Neck: neck supple and non tender   Pulmonary/Chest: coarse to auscultation bilaterally- with  rhonchi  Cardiovascular: normal rate, regular rhythm, normal S1 and S2, no murmurs. Abdomen: soft,surgical INCISION INTACT ,NO ERYTHEMA  ,MORGAN drain serous   Extremities: no cyanosis, clubbing   Edema   PICC line in place       Data Review:    Recent Labs     05/22/19 0451 05/22/19 1958 05/23/19 0430 05/24/19  0511   WBC 18.7*  --  18.9* 14.7*   HGB 11.0* 12.2 11.3* 10.6*   HCT 33.4* 37.5 34.5* 32.2*   MCV 87.4  --  88.0 86.8     --  235 234     Recent Labs     05/22/19 0451 05/23/19 0430 05/24/19  0511    133* 132*   K 4.6 4.7 4.8   CL 95* 98 102   CO2 31 27 22   PHOS 3.7  --  3.3   BUN 33* 38* 38*   CREATININE <0.40* <0.40* <0.40*     No results for input(s): AST, ALT, ALB, BILIDIR, BILITOT, ALKPHOS in the last 72 hours. No results for input(s): LIPASE, AMYLASE in the last 72 hours. Recent Labs     05/22/19 0451   PROTIME 15.0*   INR 1.2       Imaging Studies:                           All appropriate imaging studies and reports reviewed: Yes       Ct Abdomen Pelvis Wo Contrast Additional Contrast? Oral    Result Date: 4/14/2019  EXAMINATION: CT OF THE ABDOMEN AND PELVIS WITHOUT CONTRAST 4/14/2019 7:34 pm TECHNIQUE: CT of the abdomen and pelvis was performed without the administration of intravenous contrast. Multiplanar reformatted images are provided for review.  Dose modulation, iterative reconstruction, and/or weight based adjustment of the mA/kV was utilized to reduce the radiation dose to as low as reasonably achievable. COMPARISON: 04/11/2019 HISTORY: ORDERING SYSTEM PROVIDED HISTORY: ABDOMINAL PAIN TECHNOLOGIST PROVIDED HISTORY: Water soluble contrast only please Ordering Physician Provided Reason for Exam: Abdominal pain - Vented patient. Contrast given via nurse through NG tube. Acuity: Unknown Type of Exam: Unknown Relevant Medical/Surgical History: Hx - Sepsis due to urinary tract infection. FINDINGS: Lower Chest: New moderate layering bilateral pleural effusions with bilateral lower lobe atelectasis. Organs: Limited evaluation due lack of intravenous contrast.  Cholelithiasis redemonstrated. No gallbladder wall thickening or biliary ductal dilatation. Scattered tiny hypodense lesions in the liver are too small to characterize but statistically represent benign cysts or hemangiomas and appear unchanged. The pancreas, spleen, adrenal glands, and kidneys are unremarkable. There is no hydronephrosis or urinary tract calculus. GI/Bowel: The stomach is distended. Enteric tube is in place. No contrast is seen distal to the pylorus and there is contrast reflux into the distal esophagus. There is no evidence of bowel obstruction. The appendix is not definitely visualized. No focal pericecal inflammatory changes are evident. Pelvis: The urinary bladder is decompressed by Tran catheter. No pelvic mass is seen. Peritoneum/Retroperitoneum: Small amount of free fluid in the pelvic cavity. No free air or focal fluid collection. No abnormal lymph node. Normal abdominal aortic caliber. Moderate calcific atherosclerosis. Bones/Soft Tissues: No acute osseous abnormality. Diffuse anasarca. Moderate degenerative changes in the lumbar spine. 1. Distended, contrast filled stomach with reflux of contrast into the esophagus.   No enteric contrast is seen distal to the pylorus suggesting delayed gastric emptying in the setting of ileus. 2. New moderate layering bilateral pleural effusions with bilateral lower lobe atelectasis. 3. Small amount of nonspecific free fluid in the pelvic cavity. 4. Anasarca. 5. Cholelithiasis. Xr Chest (single View Frontal)    Result Date: 4/13/2019  EXAMINATION: SINGLE XRAY VIEW OF THE CHEST 4/13/2019 7:18 am COMPARISON: April 12, 2019 HISTORY: ORDERING SYSTEM PROVIDED HISTORY: dyspnea TECHNOLOGIST PROVIDED HISTORY: dyspnea Ordering Physician Provided Reason for Exam: dyspnea Acuity: Acute Type of Exam: Subsequent/Follow-up Additional signs and symptoms: dyspnea FINDINGS: A right IJ catheter is seen with its tip terminating at the superior cavoatrial junction. The left chest wall pacemaker and leads are stable. The cardiomediastinal silhouette is stable. There is interval increased opacity at the right lung base, may be related to atelectasis versus pneumonia. There is small atelectasis and mild pleural effusion at the left lung base. There is no pneumothorax. There is no acute osseous abnormality. Interval increased opacity at the right lung base, may be related to atelectasis versus pneumonia. Small atelectasis and mild pleural effusion at the left lung, new since the prior study. Xr Femur Left (min 2 Views)    Result Date: 3/27/2019  EXAMINATION: 4 XRAY VIEWS OF THE LEFT FEMUR; 2 XRAY VIEWS OF THE LEFT KNEE; 3 XRAY VIEWS OF THE LEFT TIBIA AND FIBULA 3/27/2019 7:00 pm COMPARISON: Left hip 11/14/2015. HISTORY: ORDERING SYSTEM PROVIDED HISTORY: pain TECHNOLOGIST PROVIDED HISTORY: pain Ordering Physician Provided Reason for Exam: pt twisted wrong, lt knee pain, unable to straighten knee. Acuity: Acute Type of Exam: Initial FINDINGS: Left femur, four views: The bones are diffusely osteopenic. No acute fracture deformity. The hip joint is maintained. The femoral head projects within the acetabulum. No focal soft tissue abnormality is seen. Left knee, two views: The knee is flexed. No acute osseous abnormality. No joint effusion. Joint spaces are not optimally profiled but no significant arthritic changes are apparent. Left tib-fib, three views: There is evidence of a remote healed distal tibia fracture. No acute fracture or dislocation is seen. No focal soft tissue abnormality. No acute osseous abnormality of the left femur. No acute osseous abnormality of the left knee. No acute osseous abnormality of the left tib-fib. Healed remote distal tibia fracture. Marked osteopenia, likely due to disuse. Xr Knee Left (1-2 Views)    Result Date: 3/27/2019  EXAMINATION: 4 XRAY VIEWS OF THE LEFT FEMUR; 2 XRAY VIEWS OF THE LEFT KNEE; 3 XRAY VIEWS OF THE LEFT TIBIA AND FIBULA 3/27/2019 7:00 pm COMPARISON: Left hip 11/14/2015. HISTORY: ORDERING SYSTEM PROVIDED HISTORY: pain TECHNOLOGIST PROVIDED HISTORY: pain Ordering Physician Provided Reason for Exam: pt twisted wrong, lt knee pain, unable to straighten knee. Acuity: Acute Type of Exam: Initial FINDINGS: Left femur, four views: The bones are diffusely osteopenic. No acute fracture deformity. The hip joint is maintained. The femoral head projects within the acetabulum. No focal soft tissue abnormality is seen. Left knee, two views: The knee is flexed. No acute osseous abnormality. No joint effusion. Joint spaces are not optimally profiled but no significant arthritic changes are apparent. Left tib-fib, three views: There is evidence of a remote healed distal tibia fracture. No acute fracture or dislocation is seen. No focal soft tissue abnormality. No acute osseous abnormality of the left femur. No acute osseous abnormality of the left knee. No acute osseous abnormality of the left tib-fib. Healed remote distal tibia fracture. Marked osteopenia, likely due to disuse.      Xr Knee Left (3 Views)    Result Date: 3/30/2019  EXAMINATION: 3 XRAY VIEWS OF THE LEFT KNEE 3/30/2019 10:58 am COMPARISON: March 27, 2018 HISTORY: ORDERING SYSTEM PROVIDED HISTORY: F/u L knee pain after cast TECHNOLOGIST PROVIDED HISTORY: AP, oblique, lateral F/u L knee pain after cast FINDINGS: Marked osteopenia. Interval casting, which degrades fine osseous detail question cortical offset in the lateral femoral metaphysis. No malalignment identified. No significant joint effusion. Interval casting. Question nondisplaced fracture in the lateral femoral metaphysis. Osteopenia. Xr Tibia Fibula Left (2 Views)    Result Date: 3/27/2019  EXAMINATION: 4 XRAY VIEWS OF THE LEFT FEMUR; 2 XRAY VIEWS OF THE LEFT KNEE; 3 XRAY VIEWS OF THE LEFT TIBIA AND FIBULA 3/27/2019 7:00 pm COMPARISON: Left hip 11/14/2015. HISTORY: ORDERING SYSTEM PROVIDED HISTORY: pain TECHNOLOGIST PROVIDED HISTORY: pain Ordering Physician Provided Reason for Exam: pt twisted wrong, lt knee pain, unable to straighten knee. Acuity: Acute Type of Exam: Initial FINDINGS: Left femur, four views: The bones are diffusely osteopenic. No acute fracture deformity. The hip joint is maintained. The femoral head projects within the acetabulum. No focal soft tissue abnormality is seen. Left knee, two views: The knee is flexed. No acute osseous abnormality. No joint effusion. Joint spaces are not optimally profiled but no significant arthritic changes are apparent. Left tib-fib, three views: There is evidence of a remote healed distal tibia fracture. No acute fracture or dislocation is seen. No focal soft tissue abnormality. No acute osseous abnormality of the left femur. No acute osseous abnormality of the left knee. No acute osseous abnormality of the left tib-fib. Healed remote distal tibia fracture. Marked osteopenia, likely due to disuse.      Xr Abdomen (kub) (single Ap View)    Result Date: 4/14/2019  EXAMINATION: SINGLE SUPINE XRAY VIEW(S) OF THE ABDOMEN 4/14/2019 7:39 am COMPARISON: CT abdomen and pelvis  film from 11 April 2019 HISTORY: pleural fluid bilaterally is noted. Indwelling cardiac pacemaker is present. Heart size is normal.  Coronary artery calcifications are evident. The esophagus is significantly dilated and fluid-filled. No paraesophageal adenopathy is evident. Organs: The spleen, pancreas, and adrenals are unremarkable. The liver contains hypodensities, likely cysts. The gallbladder is distended and contains a solitary gallstone. The kidneys excrete contrast bilaterally. Extrarenal collecting systems are noted. The ureters are mildly dilated down to the urinary bladder without evidence of intraluminal or ureteral obstructing calculi. GI/Bowel: Marked distention of the stomach is noted; this continues to the pylorus with appearance suggesting partial gastric outlet obstruction. Fluid is present in some small bowel loops and colon distal to the stomach. No small bowel obstruction. Pelvis: Marked distention of the urinary bladder is noted. 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 Distended ureters may be related to reflux or infection. No free pelvic fluid, pelvic or inguinal adenopathy is noted. Peritoneum/Retroperitoneum: No aortic aneurysm. Mild to moderate plaque is noted in the infrarenal abdominal aorta and both proximal iliac arteries. Shotty lymph nodes are present around the aorta in the upper abdomen. No mesenteric adenopathy is noted. Bones/Soft Tissues: Degenerative changes are present in the hips and lower lumbar facets. Estimated biologic radiation dose for this procedure:258.77 mGy/cm2.     1. Dilatation of the thoracic esophagus filled with fluid. No obstructive process is noted. No adjacent enlarged lymph nodes. 2. Trace pleural fluid. 3. Marked distention of the stomach. This appears to extend to the pylorus. Partial gastric outlet obstruction is not excluded. 4. Bilateral dilated ureters without obstructing calculi.   Urinary bladder is markedly distended with bladder wall air suggesting emphysematous cystitis. Dilatation of the ureters may be related to reflux or infection. 5. Atherosclerotic disease. 6. Other findings as above. Critical results were called by Dr. Anson Cisse MD to [address] on 4/11/2019 at 17:37. Xr Chest Portable    Result Date: 4/16/2019  EXAMINATION: SINGLE XRAY VIEW OF THE CHEST 4/16/2019 6:54 am COMPARISON: 04/15/2019, 610 hours HISTORY: ORDERING SYSTEM PROVIDED HISTORY: ETT placement TECHNOLOGIST PROVIDED HISTORY: ETT placement Ordering Physician Provided Reason for Exam: on vent Acuity: Acute Type of Exam: Initial 58-year-old female on ventilator; check endotracheal tube placement FINDINGS: Portable AP upright view of the chest. Endotracheal tube distal tip overlying the mid trachea approximately 4.1 cm above the level of the ron. Enteric tube traverses the GE junction with distal tip excluded from the field of view. Left subclavian approach cardiac pacemaker device distal lead tips relatively stable in position. Right internal jugular approach central venous catheter distal tip overlying the high right atrium, stable. Cardiac monitor leads overlie the chest. Atherosclerotic calcification of the thoracic aorta. Slight stable volume loss of the left hemithorax. No pneumothorax. No free air. Dense retrocardiac/left basilar airspace consolidation and small left-sided pleural effusion. Stable mild focal opacity at the right mid lung zone. Underlying COPD. Stable mild pulmonary vascular congestion and left-sided predominant parahilar opacity. Visualized osseous structures remain unchanged. 1. Stable multifocal airspace disease as detailed above with dense retrocardiac/left basilar airspace consolidation and small left-sided pleural effusion. Mild pulmonary vascular congestion. Findings may represent edema or multifocal pneumonia. 2. Underlying COPD. 3. Tubes and line as detailed above.      Xr Chest Portable    Result Date: 4/15/2019  EXAMINATION: SINGLE XRAY VIEW OF THE CHEST 4/15/2019 6:47 am COMPARISON: 14 April 2019 HISTORY: ORDERING SYSTEM PROVIDED HISTORY: ETT placement TECHNOLOGIST PROVIDED HISTORY: ETT placement Ordering Physician Provided Reason for Exam: on vent Acuity: Acute Type of Exam: Initial FINDINGS: AP portable view of the chest time stamped at 612 hours demonstrates overlying cardiac monitoring electrodes. Endotracheal tube terminates 4 cm above the ron. Bipolar pacemaker enters from the left with intact leads in appropriate positions. Intestinal tube extends beyond the fundus of the stomach, tip not included. Right internal jugular catheter terminates at the cavoatrial junction. Heart size is normal.  Aortic arch is calcified. There is interval improvement in vascular congestion with resolution of perihilar opacities. Some bibasilar opacities remain. No extrapleural air is noted. Osseous structures are stable. Interval improvement in vascular congestion and bilateral opacities consistent with resolving pulmonary edema. Tubes and lines as above. Xr Chest Portable    Result Date: 4/14/2019  EXAMINATION: SINGLE XRAY VIEW OF THE CHEST 4/14/2019 7:57 am COMPARISON: Portable chest 04/13/2019. HISTORY: ORDERING SYSTEM PROVIDED HISTORY: Intubation TECHNOLOGIST PROVIDED HISTORY: Intubation Ordering Physician Provided Reason for Exam: intubation Acuity: Acute Type of Exam: Initial Additional signs and symptoms: intubation FINDINGS: Endotracheal tube terminates over the midthoracic trachea. Dual-chamber pacemaker leads appear unchanged in position. Right IJ approach central venous catheter unchanged in position. Heart size not substantially changed. Perihilar and basilar opacities further increased. Left pleural effusion increased in size. Findings may reflect pulmonary edema, progressed from yesterday's exam.  Left pleural effusion increased in size.      Xr Chest Portable    Result Date: 4/12/2019  EXAMINATION: SINGLE XRAY VIEW OF THE CHEST 4/12/2019 5:26 am COMPARISON: November 14, 2015. HISTORY: ORDERING SYSTEM PROVIDED HISTORY: line placement TECHNOLOGIST PROVIDED HISTORY: line placement Ordering Physician Provided Reason for Exam: New right side line placement. Acuity: Acute Type of Exam: Initial Additional signs and symptoms: New right side line placement. FINDINGS: Stable left pectoral trans venous cardiac pacer device. New right IJ central venous catheter with tip near the superior atrial caval junction. Normal lung volume. No new consolidation. Curvilinear radiopacity projecting over the right upper lobe likely represents artifact from a skin fold. No pleural effusion or pneumothorax. Stable cardiomediastinal silhouette and great vessels with redemonstration of atherosclerotic thoracic aorta. New right IJ central venous catheter with tip near the superior atrial caval junction. No pneumothorax. No new consolidation. Thank you for allowing me to participate in the care of your patient. Please feel free to contact me with any questions or concerns.      Danna Guzman MD

## 2019-05-24 NOTE — CARE COORDINATION
RODGER received info from Dr. Maren Mcrae that he would be willing to provide the peer to peer if Dr. Shanta Luu was on board with that. RODGER informed Jayda Ramírez of this and she did ask Dr. Shanta Luu. He was ok with Dr. Maren Mcrae stepping in.  SW attempted call the number but SW would need an extension number. RODGER contacted Josh Jaffe with [employer] and she reported that she would see what she could figure out. Josh Jaffe did call this SW back and informed this SW that the insurance is going to allow [employer] to resubmit on Tuesday. They further reported that if there is a reason noted in the chart for why this patient cannot get an PEG, that in and  Of itself may overturn the denial.  RODGER informed Jayda Ramírez RN, clinical lead of this information. RODGER will continue to follow.

## 2019-05-24 NOTE — PROGRESS NOTES
Pulmonary Progress Note  [facility]      Patient - Ned Noriega,  Age - 77 y.o.    - 1950      Room Number - 2096/2096-01   N -  [id number]   [facility] - [de-identified]  Date of Admission -  2019  2:48 PM        Consulting Justin Vyas MD  Primary Care Physician - Lucas Valente, DO     SUBJECTIVE   She is calling out to [facility], [facility]" but can easily be reoriented and then is appropriate    OBJECTIVE   VITALS    height is 5' (1.524 m) and weight is 102 lb 4.7 oz (46.4 kg). Her axillary temperature is 97.6 °F (36.4 °C). Her blood pressure is 114/56 (abnormal) and her pulse is 103. Her respiration is 16 and oxygen saturation is 96%. Body mass index is 19.98 kg/m². Temperature Range: Temp: 97.6 °F (36.4 °C) Temp  Av.3 °F (36.8 °C)  Min: 97.6 °F (36.4 °C)  Max: 100 °F (37.8 °C)  BP Range:  Systolic (16PMS), UBD:471 , Min:93 , IAE:864     Diastolic (21FGJ), LKR:81, Min:43, Max:61    Pulse Range: Pulse  Av.4  Min: 101  Max: 106  Respiration Range: Resp  Av.1  Min: 15  Max: 24  Current Pulse Ox[de-identified]  SpO2: 96 %  24HR Pulse Ox Range:  SpO2  Av.2 %  Min: 95 %  Max: 99 %  Oxygen Amount and Delivery: O2 Flow Rate (L/min): 2 L/min    Wt Readings from Last 3 Encounters:   19 102 lb 4.7 oz (46.4 kg)   19 89 lb (40.4 kg)   19 94 lb 1.6 oz (42.7 kg)       I/O (24 Hours)    Intake/Output Summary (Last 24 hours) at 2019 1705  Last data filed at 2019 0513  Gross per 24 hour   Intake --   Output 760 ml   Net -760 ml       EXAM     General Appearance  Awake, alert, thin and frail   HEENT - normocephalic, atraumatic.  []  Mallampati  [] Crowded airway   [] Macroglossia  []  Retrognathia  [] Micrognathia  []  Normal tongue size []  Normal Bite  [] Wetzel sign positive    Neck - Supple,  trachea midline   Lungs - diminished with poor air movement  Cardiovascular - Heart sounds are normal. Regular rate and rhythm   Abdomen - Soft, nontender, nondistended, no masses or organomegaly  Neurologic - There are no focal motor or sensory deficits  Skin - No bruising or bleeding  Extremities - No clubbing, cyanosis, edema    MEDS      amiodarone  100 mg Oral BID    levothyroxine  50 mcg Oral Daily    mirtazapine  7.5 mg Oral Nightly    ipratropium-albuterol  1 ampule Inhalation 4x daily    enoxaparin  30 mg Subcutaneous Daily    metoprolol  2.5 mg Intravenous Q8H    fat emulsion  100 mL Intravenous Daily    insulin lispro  0-6 Units Subcutaneous Q6H    sodium chloride flush  10 mL Intravenous 2 times per day    pantoprazole  40 mg Intravenous Daily    And    sodium chloride (PF)  10 mL Intravenous Daily    alteplase  1 mg Intracatheter Once    mometasone-formoterol  2 puff Inhalation BID      PN-Adult 2-in-1 Central Line (Standard)      PN-Adult 2-in-1 [employer] (Standard) 65 mL/hr at 05/23/19 1750    dextrose      lactated ringers 10 mL/hr at 05/17/19 1917     oxyCODONE-acetaminophen **OR** oxyCODONE-acetaminophen, morphine **OR** morphine, LORazepam, glucose, dextrose, glucagon (rDNA), dextrose, sodium phosphate IVPB **OR** sodium phosphate IVPB, sodium chloride flush, ondansetron, potassium chloride, magnesium sulfate    LABS   CBC   Recent Labs     05/24/19  0511   WBC 14.7*   HGB 10.6*   HCT 32.2*   MCV 86.8        BMP:   Lab Results   Component Value Date     05/24/2019    K 4.8 05/24/2019     05/24/2019    CO2 22 05/24/2019    BUN 38 05/24/2019    LABALBU 3.5 05/18/2019    LABALBU 4.6 05/17/2012    CREATININE <0.40 05/24/2019    CALCIUM 8.0 05/24/2019    GFRAA CANNOT BE CALCULATED 05/24/2019    LABGLOM CANNOT BE CALCULATED 05/24/2019     ABGs:  Lab Results   Component Value Date    PHART 7.487 05/20/2019    PO2ART 62.2 05/20/2019    BBV4UWQ 48.9 05/20/2019      Lab Results   Component Value Date    MODE PRVC 05/20/2019     Ionized Calcium:  No results found for: IONCA  Magnesium:    Lab Results   Component Value Date    MG 2.0 05/24/2019     Phosphorus:    Lab Results   Component Value Date    PHOS 3.3 05/24/2019        LIVER PROFILE No results for input(s): AST, ALT, LIPASE, BILIDIR, BILITOT, ALKPHOS in the last 72 hours. Invalid input(s): AMYLASE,  ALB  INR   Recent Labs     05/22/19  0451   INR 1.2     PTT   Lab Results   Component Value Date    APTT 31.9 05/22/2019         RADIOLOGY     (See actual reports for details)    ASSESSMENT/PLAN   Principal Problem:    Acute respiratory failure ([facility] [address]  Active Problems:    Sepsis due to urinary tract infection (HCC)    Cystitis    Emphysematous cystitis    Septic shock (HCC)    Leukemoid reaction    Aspiration pneumonia of right lower lobe due to vomit (HCC)    MRSA carrier    Urinary retention    Abdominal distention    Elevated CEA    Elevated CA 19-9 level    Cholecystitis    CRP elevated    Elevated procalcitonin    Bandemia    Centrilobular emphysema (HCC)    Hemorrhage of rectum and anus    GI bleed    Anemia    Small bowel obstruction (HCC)    Anxiety disorder    Noncompliance    Chronic obstructive pulmonary disease, unspecified ([facility] [address]  Resolved Problems:    * No resolved hospital problems.  *    She appears to be stable from a pulmonary standpoint  Her acute respiratory failure is resolved  Continue nebulizer for COPD  Will need placement    Electronically signed by Stacie Encarnacion MD on 5/24/2019 at 5:08 PM

## 2019-05-24 NOTE — PROGRESS NOTES
Spoke with Dr. Paris Vela. Physician would like Dr. Christie Blanc to do peer to peer with insurance because patient is a surgical case. Will perfect serve Dr. Christie Blanc to see willing to do.

## 2019-05-24 NOTE — PROGRESS NOTES
[facility]   Progress Note                   Date:   5/24/2019  Patient name: Loraine Carson  Date of admission:  4/11/2019  2:48 PM  MRN:   [id number]  YOB: 1950  PCP: Roxana Roper DO    Reason for Admission:      Subjective:       Patient seen and examined at bedside. Complains of abdominal pain. No chest pain or SOB. Medications:   Scheduled Meds:   amiodarone  100 mg Oral BID    levothyroxine  50 mcg Oral Daily    mirtazapine  7.5 mg Oral Nightly    ipratropium-albuterol  1 ampule Inhalation 4x daily    enoxaparin  30 mg Subcutaneous Daily    metoprolol  2.5 mg Intravenous Q8H    fat emulsion  100 mL Intravenous Daily    insulin lispro  0-6 Units Subcutaneous Q6H    sodium chloride flush  10 mL Intravenous 2 times per day    pantoprazole  40 mg Intravenous Daily    And    sodium chloride (PF)  10 mL Intravenous Daily    alteplase  1 mg Intracatheter Once    mometasone-formoterol  2 puff Inhalation BID       Continuous Infusions:   PN-Adult 2-in-1 Central Line (Standard) 65 mL/hr at 05/23/19 1750    dextrose      lactated ringers 10 mL/hr at 05/17/19 1917       CBC:   Recent Labs     05/22/19  0451 05/22/19  1958 05/23/19  0430 05/24/19  0511   WBC 18.7*  --  18.9* 14.7*   HGB 11.0* 12.2 11.3* 10.6*     --  235 234     BMP:    Recent Labs     05/22/19  0451 05/23/19  0430 05/24/19  0511    133* 132*   K 4.6 4.7 4.8   CL 95* 98 102   CO2 31 27 22   BUN 33* 38* 38*   CREATININE <0.40* <0.40* <0.40*   GLUCOSE 97 100* 115*     Hepatic: No results for input(s): AST, ALT, ALB, BILITOT, ALKPHOS in the last 72 hours. Troponin: No results for input(s): TROPONINI in the last 72 hours. BNP: No results for input(s): BNP in the last 72 hours. Lipids: No results for input(s): CHOL, HDL in the last 72 hours.     Invalid input(s): LDLCALCU  INR:   Recent Labs     05/22/19  0451   INR 1.2       Objective:   Vitals: BP (!) 100/54   Pulse 106   Temp 98.4 °F (36.9 °C) (Oral)   Resp 18   Ht 5' (1.524 m)   Wt 102 lb 4.7 oz (46.4 kg)   SpO2 96%   BMI 19.98 kg/m²     General appearance: awake, alert, in no apparent respiratory distress   HEENT: Head: Normocephalic, no lesions, without obvious abnormality  Neck: no JVD  Lungs: clear to auscultation bilaterally, no basilar rales, no wheezing   Heart: regular rate and rhythm, S1, S2 normal, no murmur, click, rub or gallop  Abdomen: soft, non-tender; bowel sounds normal  Extremities: No LE edema      EKG: Normal sinus      ECHO: 4/12/19  Small LV cavity. Normal LV wall thickness. Mildly decreased Left ventricular systolic function. Estimated LV EF 45 %. Hypokinetic basal and mid segments. Hyperkinetic apex. Evidence of diastolic dysfunction. Mild tricuspid regurgitation. Estimated right ventricular systolic pressure  is 22IKDG.     Stress Test: not obtained. Cardiac Angiography: not obtained. Assessment:     Patient Active Problem List:     Paraproteinemia     CVA (cerebral vascular accident) ([address]     Abnormal computed tomography of cervical spine     Weight loss     Functional gait abnormality     Left knee pain     Knee pain, left     Acute pain of left knee     Sepsis due to urinary tract infection ([address]     Cystitis     Emphysematous cystitis     Septic shock (HCC)     Leukemoid reaction     Aspiration pneumonia of right lower lobe due to vomit (HCC)     MRSA carrier     Urinary retention     Abdominal distention     Elevated CEA     Elevated CA 19-9 level     Cholecystitis     CRP elevated     Elevated procalcitonin     Bandemia     Centrilobular emphysema (HCC)     Hemorrhage of rectum and anus     GI bleed     Anemia     Small bowel obstruction (HCC)     Anxiety disorder     Noncompliance     Acute respiratory failure ([address]        Treatment Plan:   1. Will decrease Amiodarone to 100 due to hypotension  2.  Will sign off, patient to follow up in office in 2 weeks, will need ischemia work up as OP Discussed with patient and nursing. Charline Bill MD  [id number] [facility]   [address]     Attending Physician Statement  I have discussed the care of the patient, including pertinent history and exam findings, with the resident. I have seen and examined the patient and the key elements of all parts of the encounter have been performed by me. I agree with the assessment, plan and orders as documented by the resident.   Please call if needed   Needs ischemia work up as out pt when fully recovered   Hever Robertson MD

## 2019-05-24 NOTE — PROGRESS NOTES
Nutrition Assessment (Parenteral Nutrition)    Type and Reason for Visit: Reassess    Nutrition Recommendations: Following changes made in additives: Na acetate- 15 mEq, NaCl- 85 to 95 mEq, Na Phos- 40 mmol, K Phos- 45 to 0 mmol, add MVI & trace elements. Nutrition Assessment: Pt stable from a nutritional viewpoint with nutrition support. No significant oral intake with continued complaints of abdominal pain and nausea. Dr. Matthew Burnette plans to take pt back to surgery tomorrow for washout and placement of retention sutures vs wound vac. Malnutrition Assessment:  · Malnutrition Status: At risk for malnutrition  · Context: Acute illness or injury  · Findings of the 6 clinical characteristics of malnutrition (Minimum of 2 out of 6 clinical characteristics is required to make the diagnosis of moderate or severe Protein Calorie Malnutrition based on AND/ASPEN Guidelines):  1. Energy Intake-Greater than 75% of estimated energy requirement, (Variable at times; over 75% of needs met for majority of admission)    2. Weight Loss-Unable to assess(edema present),    3. Fat Loss-Unable to assess(Pt is thin; edema present; no known recent loss of muscle or fat),    4. Muscle Loss-Unable to assess,    5. Fluid Accumulation-Moderate to severe fluid accumulation, Extremities, Generalized  6.  Strength-Not measured    Nutrition Risk Level: High    Nutrient Needs:  · Estimated Daily Total Kcal: 2526-2719 based on 35-39 kcal per kg of usual body wt  · Estimated Daily Protein (g): 63-68 based on 1.4-1.5 gm/kg IBW(revised)    Nutrition Diagnosis:   · Problem: Inadequate oral intake  · Etiology: related to Alteration in GI function, Insufficient energy/nutrient consumption     Signs and symptoms:  as evidenced by Intake 0-25%    Objective Information:  · Nutrition-Focused Physical Findings: Cramping, abdominal pain, nausea at times. Edema: +1 pitting RUE, +3 pitting LUE.   · Wound Type: Deep Tissue Injury, Multiple, Pressure Ulcer, Stage I, Stage II, Surgical Wound  · Current Nutrition Therapies:  · Oral Diet Orders: NPO   · Oral Diet intake: NPO  · Oral Nutrition Supplement (ONS) Orders: Clear Liquid Oral Supplement  · ONS intake: 1-25%  · Parenteral Nutrition Orders:  · Type and Formula: Premix Central, 2-in-1 Custom   · Lipids: 100ml  · Rate/Volume: 65/1560 ml daily  · Duration: Continuous  · Current PN Order Provides: 1573 kcal, 78 gm pro  · Goal PN Orders Provides: Total nutrition support goal: 2311-0085 kcal, 63-68 gm protein  · Additional Calories: None  · Anthropometric Measures:  · Ht: 5' (152.4 cm)   · Current Body Wt: 102 lb (46.3 kg)  · Admission Body Wt: 102 lb (46.3 kg)  · Usual Body Wt: 89 lb 1.1 oz (40.4 kg)(3-30-19)  · Ideal Body Wt: 100 lb (45.4 kg), % Ideal Body 89% (based on wt 5-12)  · BMI Classification: BMI 18.5 - 24.9 Normal Weight    Nutrition Interventions:   Continue NPO, Modify current Parenteral Nutrition  Continued Inpatient Monitoring    Nutrition Evaluation:   · Evaluation: Progressing toward goals   · Goals: Adequate nutritional intake or provision    · Monitoring: Nutrition Progression, Meal Intake, Supplement Intake, PN Intake, Diet Tolerance, Weight, Pertinent Labs, Wound Healing, I&O, Monitor Hemodynamic Status, Chewing/Swallowing      ALL Sarah R.D.   Clinical Dietitian  Office: [phone]

## 2019-05-24 NOTE — PLAN OF CARE
Problem: Falls - Risk of:  Goal: Will remain free from falls  Description  Will remain free from falls  5/24/2019 0423 by Brandy Mcgill RN  Outcome: Met This Shift     Problem: Falls - Risk of:  Goal: Absence of physical injury  Description  Absence of physical injury  5/24/2019 0423 by Brandy Mcgill RN  Outcome: Met This Shift     Problem: Risk for Impaired Skin Integrity  Goal: Tissue integrity - skin and mucous membranes  Description  Structural intactness and normal physiological function of skin and  mucous membranes.   5/24/2019 0423 by Brandy Mcgill RN  Outcome: Ongoing     Problem: Infection, Septic Shock:  Goal: Will show no infection signs and symptoms  Description  Will show no infection signs and symptoms  Outcome: Ongoing     Problem: Tissue Perfusion, Altered:  Goal: Circulatory function within specified parameters  Description  Circulatory function within specified parameters  Outcome: Ongoing     Problem: Pain:  Goal: Pain level will decrease  Description  Pain level will decrease  5/24/2019 0423 by Brandy Mcgill RN  Outcome: Ongoing     Problem: Pain:  Goal: Control of acute pain  Description  Control of acute pain  Outcome: Ongoing     Problem: Pain:  Goal: Control of chronic pain  Description  Control of chronic pain  Outcome: Ongoing     Problem: Nutrition  Goal: Optimal nutrition therapy  Description       Outcome: Ongoing

## 2019-05-24 NOTE — CARE COORDINATION
Writer was informed by may the pt was denied. If the dr wants to do dr to dr by calling chad at [phone] by [address] [place] [address].  Ref # [id number]

## 2019-05-24 NOTE — PROGRESS NOTES
[address] [facility]    PROGRESS NOTE             5/24/2019    10:33 AM    Name:   Yoselin Ayala  MRN:     [id number]     Acct:      [de-identified]   Room:   2096/2096-01   Day:  37  Admit Date:  4/11/2019  2:48 PM    PCP:  Les Flores DO  Code Status:  Full Code    Subjective:     C/C:   Chief Complaint   Patient presents with    Abdominal Pain     Interval History Status: unchanged    Patient seen and examined at bedside. Denies acute events overnight. Reports continued pain. SW note acknowledged, RN perfect serve sent to Dr. Freeman Course for [id number] for [facility]. Brief History:     S/p open ashley, ex lap, R colectomy w/ ileocolic anastomosis, excision small bowel diverticulum, extensive enterolysis. Intubated on vent, low pressor requirement, worsening edema, on solumedrol and albumin support. Extubated on 5/20. Edema improving, feeding transitioning to NGT. Review of Systems:     Review of Systems   Constitutional: Positive for appetite change (very hungry) and fatigue. Negative for fever. Respiratory: Negative for shortness of breath and wheezing. Cardiovascular: Negative for chest pain, palpitations and leg swelling. Gastrointestinal: Positive for abdominal pain. Psychiatric/Behavioral: The patient is nervous/anxious. Medications: Allergies:     Allergies   Allergen Reactions    Penicillins Shortness Of Breath and Rash    Amoxicillin        Current Meds:   Scheduled Meds:    amiodarone  100 mg Oral BID    levothyroxine  50 mcg Oral Daily    mirtazapine  7.5 mg Oral Nightly    ipratropium-albuterol  1 ampule Inhalation 4x daily    enoxaparin  30 mg Subcutaneous Daily    metoprolol  2.5 mg Intravenous Q8H    fat emulsion  100 mL Intravenous Daily    insulin lispro  0-6 Units Subcutaneous Q6H    sodium chloride flush  10 mL Intravenous 2 times per day    pantoprazole  40 mg Intravenous Daily    And    sodium chloride (PF)  10 mL Intravenous Daily    alteplase  1 mg Intracatheter Once    mometasone-formoterol  2 puff Inhalation BID     Continuous Infusions:    PN-Adult 2-in-1 Central Line (Standard) 65 mL/hr at 19 1750    dextrose      lactated ringers 10 mL/hr at 19 1917     PRN Meds: oxyCODONE-acetaminophen **OR** oxyCODONE-acetaminophen, morphine **OR** morphine, LORazepam, glucose, dextrose, glucagon (rDNA), dextrose, sodium phosphate IVPB **OR** sodium phosphate IVPB, sodium chloride flush, ondansetron, potassium chloride, magnesium sulfate    Data:     Past Medical History:   has a past medical history of Abnormal computed tomography of cervical spine, CVA (cerebral vascular accident) ([address] GERD (gastroesophageal reflux disease), Hypertension, Paraproteinemia, and Weight loss. Social History:   reports that she has been smoking. She has a 30.00 pack-year smoking history. She has never used smokeless tobacco. She reports that she drank about 16.8 oz of alcohol per week. She reports that she does not use drugs. Family History: History reviewed. No pertinent family history. Vitals:  BP (!) 100/54   Pulse 106   Temp 98.4 °F (36.9 °C) (Oral)   Resp 18   Ht 5' (1.524 m)   Wt 102 lb 4.7 oz (46.4 kg)   SpO2 96%   BMI 19.98 kg/m²   Temp (24hrs), Av.3 °F (36.8 °C), Min:97.6 °F (36.4 °C), Max:100 °F (37.8 °C)    Recent Labs     19  2100 19  0018 19  0604 19  0743   POCGLU 111* 101 99 106*       I/O(24Hr):     Intake/Output Summary (Last 24 hours) at 2019 1033  Last data filed at 2019 0513  Gross per 24 hour   Intake 30 ml   Output 1360 ml   Net -1330 ml       Labs:    CBC:   Lab Results   Component Value Date    WBC 14.7 2019    RBC 3.71 2019    RBC 3.66 2012    HGB 10.6 2019    HCT 32.2 2019    MCV 86.8 2019    MCH 28.5 2019    MCHC 32.9 2019    RDW 16.4 2019     2019     Results   Component Value Date    LABALBU 3.5 05/18/2019    LABALBU 4.6 05/17/2012       Lab Results   Component Value Date/Time    SPECIAL R AC 5CC PURPLE 3CC RED 05/16/2019 12:20 PM     Lab Results   Component Value Date/Time    CULTURE NO GROWTH 6 DAYS 05/16/2019 12:20 PM       Radiology:    Ct Abdomen Pelvis Wo Contrast Additional Contrast? Oral    Result Date: 4/14/2019  EXAMINATION: CT OF THE ABDOMEN AND PELVIS WITHOUT CONTRAST 4/14/2019 7:34 pm TECHNIQUE: CT of the abdomen and pelvis was performed without the administration of intravenous contrast. Multiplanar reformatted images are provided for review. Dose modulation, iterative reconstruction, and/or weight based adjustment of the mA/kV was utilized to reduce the radiation dose to as low as reasonably achievable. COMPARISON: 04/11/2019 HISTORY: ORDERING SYSTEM PROVIDED HISTORY: ABDOMINAL PAIN TECHNOLOGIST PROVIDED HISTORY: Water soluble contrast only please Ordering Physician Provided Reason for Exam: Abdominal pain - Vented patient. Contrast given via nurse through NG tube. Acuity: Unknown Type of Exam: Unknown Relevant Medical/Surgical History: Hx - Sepsis due to urinary tract infection. FINDINGS: Lower Chest: New moderate layering bilateral pleural effusions with bilateral lower lobe atelectasis. Organs: Limited evaluation due lack of intravenous contrast.  Cholelithiasis redemonstrated. No gallbladder wall thickening or biliary ductal dilatation. Scattered tiny hypodense lesions in the liver are too small to characterize but statistically represent benign cysts or hemangiomas and appear unchanged. The pancreas, spleen, adrenal glands, and kidneys are unremarkable. There is no hydronephrosis or urinary tract calculus. GI/Bowel: The stomach is distended. Enteric tube is in place. No contrast is seen distal to the pylorus and there is contrast reflux into the distal esophagus. There is no evidence of bowel obstruction.   The appendix is not definitely visualized. No focal pericecal inflammatory changes are evident. Pelvis: The urinary bladder is decompressed by Tran catheter. No pelvic mass is seen. Peritoneum/Retroperitoneum: Small amount of free fluid in the pelvic cavity. No free air or focal fluid collection. No abnormal lymph node. Normal abdominal aortic caliber. Moderate calcific atherosclerosis. Bones/Soft Tissues: No acute osseous abnormality. Diffuse anasarca. Moderate degenerative changes in the lumbar spine. 1. Distended, contrast filled stomach with reflux of contrast into the esophagus. No enteric contrast is seen distal to the pylorus suggesting delayed gastric emptying in the setting of ileus. 2. New moderate layering bilateral pleural effusions with bilateral lower lobe atelectasis. 3. Small amount of nonspecific free fluid in the pelvic cavity. 4. Anasarca. 5. Cholelithiasis. Xr Chest (single View Frontal)    Result Date: 5/11/2019  EXAMINATION: ONE XRAY VIEW OF THE CHEST 5/11/2019 6:28 am COMPARISON: 10 May 2019 HISTORY: ORDERING SYSTEM PROVIDED HISTORY: Respiratory failure TECHNOLOGIST PROVIDED HISTORY: Respiratory failure Ordering Physician Provided Reason for Exam: Respiratory failure Acuity: Unknown Type of Exam: Unknown FINDINGS: AP portable view of the chest time stamped at 603 hours demonstrates findings of generalized COPD. Overlying cardiac monitoring electrodes are present. An intestinal tube extends below the fundus of the stomach, tip not included. Right central line terminates in the distal superior vena cava. Bipolar pacemaker enters from the left with intact leads in appropriate positions. Heart size is stable, top-normal.  Lung fields are hyperinflated suggestive of COPD. Interstitial markings are coarsened, similar to that noted in 2015 likely chronic interstitial change. There is improved aeration at the left lung base with slight blunting of the left costophrenic angle suggesting effusion.   There is been interval clearing of right basilar opacities likely resolved atelectasis. Osseous and mediastinal structures are age-appropriate. No vascular congestion or extrapleural air is noted. Improved aeration of both lung bases with resolved right basilar opacity. Mild left basilar opacity persists with blunting of the left costophrenic angle with small effusion on the left suspected. Findings of COPD and suspected chronic fibrotic change are noted. No extrapleural air. Tubes and lines as above. Xr Chest (single View Frontal)    Result Date: 5/2/2019  EXAMINATION: SINGLE XRAY VIEW OF THE CHEST 5/2/2019 6:34 am COMPARISON: 29 April 2019 HISTORY: ORDERING SYSTEM PROVIDED HISTORY: COPD TECHNOLOGIST PROVIDED HISTORY: COPD Ordering Physician Provided Reason for Exam: COPD Acuity: Acute Type of Exam: Initial FINDINGS: AP portable view of the chest time stamped at 630 hours demonstrates stable cardiac size. Overlying cardiac monitoring electrodes are present. Right-sided PICC line terminates in the right atrium. Bipolar pacemaker enters from the left with intact leads in appropriate positions. There is been no significant interval change in bilateral interstitial opacities, representing interval change since 2015. This may be related to worsening interstitial disease or superimposed venous congestion. Bilateral effusions are noted with bibasilar opacities, either atelectasis or edema. Continued bilateral effusions, bibasilar opacities and bilateral interstitial opacities in the upper lobes. Findings may be related to worsening interstitial disease and edema. Airspace disease is not excluded.      Xr Chest (single View Frontal)    Result Date: 4/13/2019  EXAMINATION: SINGLE XRAY VIEW OF THE CHEST 4/13/2019 7:18 am COMPARISON: April 12, 2019 HISTORY: ORDERING SYSTEM PROVIDED HISTORY: dyspnea TECHNOLOGIST PROVIDED HISTORY: dyspnea Ordering Physician Provided Reason for Exam: dyspnea Acuity: Acute Type of Exam: Subsequent/Follow-up Additional signs and symptoms: dyspnea FINDINGS: A right IJ catheter is seen with its tip terminating at the superior cavoatrial junction. The left chest wall pacemaker and leads are stable. The cardiomediastinal silhouette is stable. There is interval increased opacity at the right lung base, may be related to atelectasis versus pneumonia. There is small atelectasis and mild pleural effusion at the left lung base. There is no pneumothorax. There is no acute osseous abnormality. Interval increased opacity at the right lung base, may be related to atelectasis versus pneumonia. Small atelectasis and mild pleural effusion at the left lung, new since the prior study. Xr Chest Standard (2 Vw)    Result Date: 4/29/2019  EXAMINATION: TWO VIEWS OF THE CHEST 4/29/2019 8:04 pm COMPARISON: 04/27/2019 HISTORY: ORDERING SYSTEM PROVIDED HISTORY: Follow-up lung infiltrate TECHNOLOGIST PROVIDED HISTORY: Follow-up lung infiltrate Ordering Physician Provided Reason for Exam: Follow-up infiltrate. Pt unable to lean forward - unable to get proper sponge behind pt for lateral. Pt unable to raise left arm at all for lateral. Best possible lateral obtained. Acuity: Unknown Type of Exam: Unknown Additional signs and symptoms: Follow-up infiltrate. Pt unable to lean forward - unable to get proper sponge behind pt for lateral. Pt unable to raise left arm at all for lateral. Best possible lateral obtained. FINDINGS: Left chest cardiac pacemaker device is in place. Right IJ central venous catheter in place with distal tip at the cavoatrial junction. Heart size is within normal limits. No vascular congestion. There are small to moderate pleural effusions. There are interstitial opacities in the upper lungs, which could be related to scarring and/or developing infiltrate, slightly improved in appearance when compared to 04/27/2019. No evidence of pneumothorax.      1.  Small to moderate bilateral pleural effusions, better visualized on lateral view. 2.  Upper lobe interstitial opacities, slightly improved when compared to the previous study, possibly related to underlying fibrotic change or residual infiltrate. Xr Knee Left (1-2 Views)    Result Date: 5/8/2019  EXAMINATION: 2 XRAY VIEWS OF THE LEFT KNEE 5/7/2019 11:19 am COMPARISON: Left knee radiographs 03/30/2019. HISTORY: ORDERING SYSTEM PROVIDED HISTORY: fracture TECHNOLOGIST PROVIDED HISTORY: fracture Ordering Physician Provided Reason for Exam: left knee/distal femur pain Acuity: Acute Type of Exam: Initial FINDINGS: Bones are osteopenic. No suprapatellar joint effusion. There is a impacted, transverse fracture of the distal femoral metadiaphysis. Increased sclerosis along the fracture line suggests interval healing. Fracture fragments are in unchanged, near anatomic alignment. No acute dislocation. No new fracture is seen. Impacted, transverse fracture of the distal femoral metadiaphysis is in unchanged alignment and demonstrates interval healing. Xr Abdomen (kub) (single Ap View)    Result Date: 5/10/2019  EXAMINATION: ONE SUPINE XRAY VIEW(S) OF THE ABDOMEN 5/10/2019 9:14 am COMPARISON: Small-bowel series 05/09/2019 HISTORY: ORDERING SYSTEM PROVIDED HISTORY: Small bowel obstruction ([address] TECHNOLOGIST PROVIDED HISTORY: TO ACCOMPANY SMALL BOWEL EXAM SMALL BOWEL OBSTRUCTION Ordering Physician Provided Reason for Exam: SMALL BOWEL FOLLOW THRU - 20 HOUR DELAYED FILM FINDINGS: Significant interval decreased amount of retained contrast in the stomach compared to last image from earlier small bowel series which likely relates to suctioning of the contrast.  Majority of previously seen contrast within the pelvis likely in the rectum is no longer visualized and has likely passed through. Residual contrast remains within the colon and small bowel. NG tube is in place with side hole in the region of the GE junction.   Partially visualized mild left pleural effusion associated atelectasis. 1. Interval presumed suctioning of contrast from the stomach which now appears relatively empty. Continued progression of contrast through the small and large bowel as described. 2. NG tube side hole at the level of the GE junction, consider advancement of 2-3 cm. Xr Abdomen (kub) (single Ap View)    Result Date: 5/8/2019  EXAMINATION: SINGLE SUPINE XRAY VIEW(S) OF THE ABDOMEN 5/8/2019 8:59 am COMPARISON: CT abdomen from 05/05/2019, and KUB from 04/19/2019 HISTORY: ORDERING SYSTEM PROVIDED HISTORY: recheck obstruction TECHNOLOGIST PROVIDED HISTORY: recheck obstruction Ordering Physician Provided Reason for Exam: recheck obstruction Acuity: Unknown Type of Exam: Unknown FINDINGS: Again noted cholecystostomy tube, electrode leads from a pacemaker overlying the heart, and overlying electrode leads/tubing. NG tube no longer demonstrated. Gas-filled bowel loops without marked dilatation; cecum measures 8 cm, slightly increased as compared to the previous study. No obvious free air. No mass or organomegaly. Bones unchanged. Retrocardiac opacity, hyperinflated lungs and probable pleural effusions. NG tube removed. Gas-filled bowel more suggestive ileus; no clear cut obstruction. Cecum measures 8 cm. Cholecystostomy tube in unchanged position. Additional unchanged findings, as above. RECOMMENDATION: Continued clinical correlation and follow-up     Xr Abdomen (kub) (single Ap View)    Result Date: 4/19/2019  EXAMINATION: SINGLE SUPINE XRAY VIEW(S) OF THE ABDOMEN 4/19/2019 9:48 am COMPARISON: April 14, 2019 HISTORY: ORDERING SYSTEM PROVIDED HISTORY: pain TECHNOLOGIST PROVIDED HISTORY: pain Ordering Physician Provided Reason for Exam: Abdominal pain and distentsion Acuity: Acute Type of Exam: Initial Additional signs and symptoms: Abdominal pain and distentsion FINDINGS: Enteric tube with the tip within the stomach. Small left pleural effusion.   Minimal right pleural adjustment of the mA/kV was utilized to reduce the radiation dose to as low as reasonably achievable. COMPARISON: April 14, 2019 HISTORY: ORDERING SYSTEM PROVIDED HISTORY: Check for abscess/fluid collection around ashley tube site. TECHNOLOGIST PROVIDED HISTORY: Ordering Physician Provided Reason for Exam: Patient c/o abd pain around her ashley site and drainage tube. FINDINGS: Evaluation is limited by motion. Lower Chest: Moderate bilateral pleural effusions. Organs: Multiple liver hypodensities measuring up to 4 mm are incompletely characterized but in the absence of risk factors likely represent cysts requiring no specific follow-up. Cholecystostomy tube is in place. No adjacent focal drainable fluid collection. No pancreatic lesion. No splenomegaly. No adrenal lesion. No hydronephrosis. No renal lesion. GI/Bowel: Stomach is markedly distended. Swirled appearance of the mesentery is seen within the mid to lower abdomen with adjacent thickened loops of small bowel. Peritoneum/Retroperitoneum: Atherosclerotic calcification of the abdominal aorta without aneurysmal dilatation. No adenopathy. Bones/Soft Tissues: No acute soft tissue abnormality. Diffuse osteopenia. Lumbar spine degenerative changes. Bowel obstruction which is suspected to be caused by an internal hernia. Cholecystostomy tube is in place. No surrounding fluid collection. Nm Gi Blood Loss    Result Date: 5/3/2019  EXAMINATION: NUCLEAR MEDICINE GASTRIC BLEEDING STUDY 5/3/2019 TECHNIQUE: Following the intravenous injection of 23.8 mCi of 99 mTc-labeled RBC's, a flow study and standard images of the abdomen was obtained over a total period of 60 minutes COMPARISON: None.  HISTORY: ORDERING SYSTEM PROVIDED HISTORY: GI BLEEDING TECHNOLOGIST PROVIDED HISTORY: Ordering Physician Provided Reason for Exam: GI bleeding Acuity: Unknown Type of Exam: Unknown FINDINGS: Activity is seen within the blood pool, including the great vessels, heart, liver, and spleen. There was no abnormal accumulation of radioactivity in the abdomen to suggest active bleeding during study acquisition. No evidence of active GI bleeding during acquisition. RECOMMENDATIONS: If the patient shows hemodynamic signs of an active bleed in the next 20 hours, additional images can be acquired. Kate Hurleyex Device Plmt/replace/removal    Result Date: 4/30/2019  PROCEDURE: ULTRASOUND GUIDED VASCULAR ACCESS. FLUOROSCOPY GUIDED PICC PLACEMENT 4/30/2019. HISTORY: ORDERING SYSTEM PROVIDED HISTORY: TPN TECHNOLOGIST PROVIDED HISTORY: TPN Lumen?->Double Lumen SEDATION: None FLUOROSCOPY DOSE AND TYPE OR TIME AND EXPOSURES: 7 seconds; D  TECHNIQUE: This procedure was performed by Dr. Janet Dumont. Informed consent was obtained after a detailed explanation of the procedure including risks, benefits, and alternatives. Universal protocol was observed. The right arm was prepped and draped in sterile fashion using maximum sterile barrier technique. Local anesthesia was achieved with lidocaine. A micropuncture needle was used to access the right basilic vein using ultrasound guidance. An ultrasound image demonstrating patency of the vein with needle tip located within it. An image was obtained and stored in PACs. A 0.018 guidewire was used to place a peel-a-way sheath and a 5 [place] dual-lumen PICC was advanced with fluoroscopic guidance with the tip at the cavo-atrial junction. The catheter flushed easily and there was a good blood return. The catheter was secured to the skin. The patient tolerated the procedure well and there were no immediate complications. FINDINGS: Fluoroscopic image demonstrates the tip of the catheter at the cavo-atrial junction.      Successful ultrasound and fluoroscopy guided PICC placement     Us Gallbladder Ruq    Result Date: 4/19/2019  EXAMINATION: RIGHT UPPER QUADRANT ULTRASOUND 4/19/2019 10:48 am COMPARISON: CT abdomen and pelvis 4/14/2018 HISTORY: ORDERING SYSTEM PROVIDED HISTORY: ABDOMINAL PAIN FINDINGS: Pancreas is not well visualized. No liver lesion. Gallbladder wall thickening. Gallbladder sludge. Cholelithiasis. Pericholecystic fluid. Common bile duct measures at the upper limits of normal at 7 mm. Findings suggesting acute cholecystitis. Xr Chest Portable    Result Date: 5/10/2019  EXAMINATION: ONE XRAY VIEW OF THE CHEST 5/10/2019 1:28 am COMPARISON: May 2, 2019. HISTORY: ORDERING SYSTEM PROVIDED HISTORY: low o2 sat TECHNOLOGIST PROVIDED HISTORY: low o2 sat Ordering Physician Provided Reason for Exam: Low o2 sat Acuity: Acute Type of Exam: Initial FINDINGS: Hyperexpanded right lung volume. Left greater than right basilar heterogeneous opacities with left basilar consolidation. Small bilateral pleural effusions. Stable cardiomediastinal silhouette and great vessels. Enteric contrast in the stomach and distal esophagus. Enteric tube courses into the stomach but tip is not definitely seen due to contrast in the stomach. Stable left pectoral cardiac pacer device. Stable right PICC. Left greater than right basilar heterogeneous opacities with left basilar consolidation. Differential considerations include atelectasis and an infectious/inflammatory process. Small bilateral pleural effusions. Enteric contrast in the stomach and distal esophagus. Enteric tube courses into the stomach but tip is not definitely seen due to contrast in the stomach. Xr Chest Portable    Result Date: 4/27/2019  EXAMINATION: SINGLE XRAY VIEW OF THE CHEST 4/27/2019 8:47 am COMPARISON: 04/26/2019 HISTORY: ORDERING SYSTEM PROVIDED HISTORY: Assess for pulm edema vs PNA TECHNOLOGIST PROVIDED HISTORY: Assess for pulm edema vs PNA Ordering Physician Provided Reason for Exam: cough, congestion Acuity: Acute Type of Exam: Initial FINDINGS: Right IJ central venous catheter is in place tip in the SVC right atrial junction. Pacer wires are in place.   The heart and mediastinal jugular central line has tip in distal superior vena cava. Interval removal of nasogastric tube. No interval change of infiltrate and atelectasis at the left lung base. Stable mild infiltrate in the left upper lobe. Mild interval improvement of infiltrate at the right lung base. Stable small bilateral pleural effusions, left larger than right. Mild CHF, stable. 1.  No interval change of infiltrate and atelectasis at the left lung base. Stable mild infiltrate in the left upper lobe. 2.  Mild interval improvement of infiltrate at the right lung base. 3.  Stable small bilateral pleural effusions, left larger than right. 4.  Mild CHF, stable. Xr Chest Portable    Result Date: 4/22/2019  EXAMINATION: SINGLE XRAY VIEW OF THE CHEST 4/22/2019 6:44 am COMPARISON: April 21, 2019. HISTORY: ORDERING SYSTEM PROVIDED HISTORY: Hypoxia and CHF TECHNOLOGIST PROVIDED HISTORY: Hypoxia and CHF Ordering Physician Provided Reason for Exam: hx of hypoxia/CHF Acuity: Acute Type of Exam: Initial FINDINGS: Right IJ central venous catheter appears in unchanged position. Nasogastric tube courses below the diaphragm, with tip not imaged. Left chest wall pacemaker device projects in unchanged position. Cardiac and mediastinal contours are enlarged but unchanged. Unchanged bilateral pleural effusions and bibasilar pulmonary opacities. Unchanged findings suggestive of congestive heart failure. No evidence of pneumothorax. No new osseous abnormalities. 1. No significant change in findings suggestive of congestive heart failure. 2. Unchanged bilateral pleural effusions and bibasilar pulmonary consolidations. RECOMMENDATION: Radiographic follow-up to complete resolution.      Xr Chest Portable    Result Date: 4/21/2019  EXAMINATION: SINGLE XRAY VIEW OF THE CHEST 4/21/2019 3:08 pm COMPARISON: April 19, 2019 HISTORY: ORDERING SYSTEM PROVIDED HISTORY: hypoxia TECHNOLOGIST PROVIDED HISTORY: hypoxia Ordering Physician Provided Reason for Exam: hypoxia Acuity: Unknown Type of Exam: Unknown FINDINGS: Enteric tube in the stomach. ET tube is been removed. Right IJ catheter terminates in the atrial caval junction. Bipolar pacer on the left unchanged. Heart and mediastinum unremarkable. Moderate edema unchanged. Small effusions, left greater than right. Bony thorax intact. Status post extubation. Life support apparatus otherwise stable. Moderate edema and effusions unchanged. Xr Chest Portable    Result Date: 4/19/2019  EXAMINATION: SINGLE XRAY VIEW OF THE CHEST 4/19/2019 5:51 am COMPARISON: April 18, 2019 HISTORY: ORDERING SYSTEM PROVIDED HISTORY: ETT placement TECHNOLOGIST PROVIDED HISTORY: ETT placement Ordering Physician Provided Reason for Exam: Vent Pt check ETT placement Acuity: Acute Type of Exam: Subsequent/Follow-up Additional signs and symptoms: Vent Pt check ETT placement FINDINGS: Tubes and lines are unchanged. Persistent bibasilar lung opacities and pleural effusions. Mild pulmonary edema. No significant interval change. Xr Chest Portable    Result Date: 4/18/2019  EXAMINATION: SINGLE XRAY VIEW OF THE CHEST 4/18/2019 6:21 am COMPARISON: April 17, 2019 HISTORY: ORDERING SYSTEM PROVIDED HISTORY: ETT placement TECHNOLOGIST PROVIDED HISTORY: ETT placement Ordering Physician Provided Reason for Exam: on vent Acuity: Acute Type of Exam: Initial FINDINGS: Right IJ line and NG tube are stable. Interval advancement of ET tube terminating 3.1 cm from ron. Implanted cardiac device is present. Left-sided effusion and associated airspace disease is stable. Hazy right basilar opacity possibly edema or atelectasis. No pneumothorax. Increased opacity left upper chest possibly focal area of atelectasis, new from prior. Advanced ETT from prior exam, now 3.1 cm from ron. Increased opacity left upper chest suspicious for atelectasis. Additional supporting devices are stable.      Xr Chest Portable    Result Date: Endotracheal tube distal tip overlying the mid trachea approximately 4.1 cm above the level of the ron. Enteric tube traverses the GE junction with distal tip excluded from the field of view. Left subclavian approach cardiac pacemaker device distal lead tips relatively stable in position. Right internal jugular approach central venous catheter distal tip overlying the high right atrium, stable. Cardiac monitor leads overlie the chest. Atherosclerotic calcification of the thoracic aorta. Slight stable volume loss of the left hemithorax. No pneumothorax. No free air. Dense retrocardiac/left basilar airspace consolidation and small left-sided pleural effusion. Stable mild focal opacity at the right mid lung zone. Underlying COPD. Stable mild pulmonary vascular congestion and left-sided predominant parahilar opacity. Visualized osseous structures remain unchanged. 1. Stable multifocal airspace disease as detailed above with dense retrocardiac/left basilar airspace consolidation and small left-sided pleural effusion. Mild pulmonary vascular congestion. Findings may represent edema or multifocal pneumonia. 2. Underlying COPD. 3. Tubes and line as detailed above. Xr Chest Portable    Result Date: 4/15/2019  EXAMINATION: SINGLE XRAY VIEW OF THE CHEST 4/15/2019 6:47 am COMPARISON: 14 April 2019 HISTORY: ORDERING SYSTEM PROVIDED HISTORY: ETT placement TECHNOLOGIST PROVIDED HISTORY: ETT placement Ordering Physician Provided Reason for Exam: on vent Acuity: Acute Type of Exam: Initial FINDINGS: AP portable view of the chest time stamped at 612 hours demonstrates overlying cardiac monitoring electrodes. Endotracheal tube terminates 4 cm above the ron. Bipolar pacemaker enters from the left with intact leads in appropriate positions. Intestinal tube extends beyond the fundus of the stomach, tip not included. Right internal jugular catheter terminates at the cavoatrial junction.   Heart size is normal. Aortic arch is calcified. There is interval improvement in vascular congestion with resolution of perihilar opacities. Some bibasilar opacities remain. No extrapleural air is noted. Osseous structures are stable. Interval improvement in vascular congestion and bilateral opacities consistent with resolving pulmonary edema. Tubes and lines as above. Xr Chest Portable    Result Date: 4/14/2019  EXAMINATION: SINGLE XRAY VIEW OF THE CHEST 4/14/2019 7:57 am COMPARISON: Portable chest 04/13/2019. HISTORY: ORDERING SYSTEM PROVIDED HISTORY: Intubation TECHNOLOGIST PROVIDED HISTORY: Intubation Ordering Physician Provided Reason for Exam: intubation Acuity: Acute Type of Exam: Initial Additional signs and symptoms: intubation FINDINGS: Endotracheal tube terminates over the midthoracic trachea. Dual-chamber pacemaker leads appear unchanged in position. Right IJ approach central venous catheter unchanged in position. Heart size not substantially changed. Perihilar and basilar opacities further increased. Left pleural effusion increased in size. Findings may reflect pulmonary edema, progressed from yesterday's exam.  Left pleural effusion increased in size.      Vl Upper Extremity Bilateral Venous Duplex    Result Date: 4/22/2019    [facility]  Vascular Upper Extremities Veins Procedure   Patient Name   Cathy Kay     Date of Study           04/22/2019                 Dale Parker   Date of Birth  1950  Gender                  Female   Age            77 year(s)  Race                       Room Number    2002        Height:                 59.84 inch, 152 cm   Corporate ID # [id number]    Weight:                 102 pounds, 46.3 kg   Patient Acct # [de-identified]   BSA:        1.4 m^2     BMI:       20.03 kg/m^2   MR #           [id number]      Sonographer             Roderick Mario   Accession #    [id number]   Interpreting Physician  Maureen Griffin   Referring                  Referring Physician !None      ! +------------------------------------+----------+---------------+----------+ ! Dist SCV                            ! Yes       ! Yes            ! None      ! +------------------------------------+----------+---------------+----------+ ! Prox Axillary                       ! Yes       ! Yes            ! None      ! +------------------------------------+----------+---------------+----------+ ! Dist Axillary                       ! Yes       ! Yes            ! None      ! +------------------------------------+----------+---------------+----------+ ! Prox Brachial                       !Yes       ! Yes            ! None      ! +------------------------------------+----------+---------------+----------+ ! Dist Brachial                       !Yes       ! Yes            ! None      ! +------------------------------------+----------+---------------+----------+ ! Prox Radial                         !Yes       ! Yes            ! None      ! +------------------------------------+----------+---------------+----------+ ! Dist Radial                         !Yes       ! Yes            ! None      ! +------------------------------------+----------+---------------+----------+ ! Prox Ulnar                          ! Yes       ! Yes            ! None      ! +------------------------------------+----------+---------------+----------+ ! Dist Ulnar                          ! Yes       ! Yes            ! None      ! +------------------------------------+----------+---------------+----------+ ! Basilic at UA                       ! Yes       ! Yes            ! None      ! +------------------------------------+----------+---------------+----------+ ! Basilic at AF                       ! Yes       ! Yes            ! None      ! +------------------------------------+----------+---------------+----------+ ! Yessenia at [address]                       ! Yes       ! Yes            ! None      ! +------------------------------------+----------+---------------+----------+ ! Gideon at UA                      ! Yes       ! Yes            ! None      ! +------------------------------------+----------+---------------+----------+ ! Cephalic at AF                      ! Yes       ! Yes            ! None      ! +------------------------------------+----------+---------------+----------+ ! Cephalic at [address]                      ! Yes       ! Yes            ! None      ! +------------------------------------+----------+---------------+----------+ Doppler Measurements +-------------------------+-----------------------+------------------------+ ! Location                 ! Signal                 !Reflux                  ! +-------------------------+-----------------------+------------------------+ ! SCV                      ! Phasic                 ! No                      ! +-------------------------+-----------------------+------------------------+ ! Axillary                 ! Phasic                 ! No                      ! +-------------------------+-----------------------+------------------------+ ! Brachial                 !Phasic                 ! No                      ! +-------------------------+-----------------------+------------------------+ Left UE Vein Measurements 2D Measurements +------------------------------------+----------+---------------+----------+ ! Location                            ! Visualized! Compressibility! Thrombosis! +------------------------------------+----------+---------------+----------+ ! Prox IJV                            ! Yes       ! Yes            ! None      ! +------------------------------------+----------+---------------+----------+ ! Dist IJV                            ! Yes       ! Yes            ! None      ! +------------------------------------+----------+---------------+----------+ ! Prox SCV                            ! Yes       ! Yes            ! None      ! +------------------------------------+----------+---------------+----------+ ! Dist SCV                            ! Yes !Yes            !None      ! +------------------------------------+----------+---------------+----------+ ! Prox Axillary                       ! Yes       ! Yes            ! None      ! +------------------------------------+----------+---------------+----------+ ! Dist Axillary                       ! Yes       ! Yes            ! None      ! +------------------------------------+----------+---------------+----------+ ! Prox Brachial                       !Yes       ! Yes            ! None      ! +------------------------------------+----------+---------------+----------+ ! Dist Brachial                       !Yes       ! Yes            ! None      ! +------------------------------------+----------+---------------+----------+ ! Prox Radial                         !Yes       ! Yes            ! None      ! +------------------------------------+----------+---------------+----------+ ! Dist Radial                         !Yes       ! Yes            ! None      ! +------------------------------------+----------+---------------+----------+ ! Prox Ulnar                          ! Yes       ! Yes            ! None      ! +------------------------------------+----------+---------------+----------+ ! Dist Ulnar                          ! Yes       ! Yes            ! None      ! +------------------------------------+----------+---------------+----------+ ! Basilic at UA                       ! Yes       ! Yes            ! None      ! +------------------------------------+----------+---------------+----------+ ! Cephalic at UA                      ! Yes       ! Yes            ! None      ! +------------------------------------+----------+---------------+----------+ ! Cephalic at AF                      ! Yes       ! Yes            ! None      ! +------------------------------------+----------+---------------+----------+ Doppler Measurements +-------------------------+-----------------------+------------------------+ ! Location                 ! Signal !Reflux                  ! +-------------------------+-----------------------+------------------------+ ! IJV                      ! Phasic                 !                        ! +-------------------------+-----------------------+------------------------+ ! SCV                      ! Phasic                 !                        ! +-------------------------+-----------------------+------------------------+ ! Axillary                 ! Phasic                 !                        ! +-------------------------+-----------------------+------------------------+ ! Brachial                 !Phasic                 !                        ! +-------------------------+-----------------------+------------------------+    Vl Dup Lower Extremity Venous Bilateral    Result Date: 5/7/2019    [facility]  Vascular Lower Extremities DVT Study Procedure   Patient Name   LUCIA [facility]     Date of Study           05/07/2019                 Eddye Loop   Date of Birth  1950  Gender                  Female   Age            77 year(s)  Race                       Room Number    2123        Height:                 59.84 inch, 152 cm   Corporate ID # [id number]    Weight:                 102 pounds, 46.3 kg   Patient Acct # [de-identified]   BSA:        1.4 m^2     BMI:      20.03 kg/m^2   MR #           [id number]      Sonographer             Jojo Marquez, [facility]   Accession #    [id number]   Interpreting Physician  Juliana Fajardo   Referring                  Referring Physician     Alan Wen MD  Nurse  Practitioner  Procedure Type of Study:   Veins: Lower Extremities DVT Study, Venous Scan Lower Bilateral.  Indications for Study:Pain and swelling. Patient Status: In Patient. Technical Quality:Limited visualization. Limitation reason:patient movement. Conclusions   Summary   No evidence of superficial or deep venous thrombosis in both lower  extremities. Technically limited visualization.    Signature !Visualized! Compressibility! Thrombosis! +------------------------------------+----------+---------------+----------+ ! Common Femoral                      !Yes       ! Yes            ! None      ! +------------------------------------+----------+---------------+----------+ ! Prox Femoral                        !Yes       ! Yes            ! None      ! +------------------------------------+----------+---------------+----------+ ! Mid Femoral                         !Yes       ! Yes            ! None      ! +------------------------------------+----------+---------------+----------+ ! Dist Femoral                        !Yes       ! Yes            ! None      ! +------------------------------------+----------+---------------+----------+ ! Deep Femoral                        !Yes       ! Yes            ! None      ! +------------------------------------+----------+---------------+----------+ ! Popliteal                           !Yes       ! Yes            ! None      ! +------------------------------------+----------+---------------+----------+ ! Sapheno Femoral Junction            ! Yes       ! Yes            ! None      ! +------------------------------------+----------+---------------+----------+ ! PTV                                 ! Partial   !Yes            ! None      ! +------------------------------------+----------+---------------+----------+ ! Peroneal                            !Partial   !Yes            ! None      ! +------------------------------------+----------+---------------+----------+ ! Gastroc                             ! Yes       ! Yes            ! None      ! +------------------------------------+----------+---------------+----------+ ! GSV Thigh                           ! Yes       ! Yes            ! None      ! +------------------------------------+----------+---------------+----------+ ! GSV Knee                            ! Yes       ! Yes            ! None      ! up to 4 hours were obtained. COMPARISON: Ultrasound 04/19/2019 HISTORY: ORDERING SYSTEM PROVIDED HISTORY: CHOLECYSTITIS TECHNOLOGIST PROVIDED HISTORY: Ordering Physician Provided Reason for Exam: Cholecystitis Acuity: Unknown Type of Exam: Unknown FINDINGS: Prompt, homogenous uptake by the liver is noted with normal appearance of radiotracer excretion into the biliary system. Clearance of bloodpool activity appears appropriate. Normal small bowel activity is seen. Prominent activity within the common bile duct. The gallbladder is not visualized by the end of the exam.     Absent filling of the gallbladder consistent with acute cholecystitis. Fl Small Bowel Follow Through Only    Result Date: 5/10/2019  EXAMINATION: SMALL BOWEL FOLLOW THROUGH SERIES 5/9/2019 TECHNIQUE: Small bowel follow through series was performed with overhead images. FLUOROSCOPY DOSE AND TYPE OR TIME AND EXPOSURES: No fluoroscopic images obtained. COMPARISON: CT abdomen pelvis 05/05/2019 HISTORY: ORDERING SYSTEM PROVIDED HISTORY: internal hernia TECHNOLOGIST PROVIDED HISTORY: Water soluble contrast only. Study to be done ONLY if NGT is placed by IR internal hernia FINDINGS:  image demonstrates NG tube overlying the left side of the abdomen within the stomach. Surgical drain overlies the right side of the abdomen. There is a nonspecific bowel gas pattern with mild dilated loops of bowel in lower abdomen. Initial images demonstrate filling of the stomach. At 1 hour and 45 minutes no significant contrast is noted in the small bowel. The 4-1/2 hour image demonstrates contrast within loops of bowel within the mid and lower abdomen and pelvis. There appears to be contrast extending to the colon in the right side of the abdomen. Contrast is noted within the pelvis which may be within the bladder or rectum. Significant delay in emptying of the stomach with persistent contrast noted at the 6 hour concha.  There is contrast extending into the bowel which appears to be in the colon. Subtle findings are limited. Consider follow-up radiograph of the abdomen in 6-8 hours. Ir Place Ng Tube By Dr Dania Sylvester    Result Date: 5/9/2019  PROCEDURE: XR PLACE NASOGASTRIC TUBE PHYS 5/9/2019 HISTORY: ORDERING SYSTEM PROVIDED HISTORY: ileus TECHNOLOGIST PROVIDED HISTORY: ileus Ordering Physician Provided Reason for Exam: ILEUS Acuity: Unknown Type of Exam: Unknown CONTRAST: None SEDATION: None FLUOROSCOPY DOSE AND TYPE OR TIME AND EXPOSURES: 21 seconds; D AP 46 DESCRIPTION OF PROCEDURE AND FINDINGS: This procedure was performed by Dr. Nichol Villafuerte. Under intermittent fluoroscopic guidance a 12 [place] nasogastric tube was placed through the right nostril and directed under fluoroscopy into the stomach. A small amount of air was injected confirming the tip location in the stomach. Partially visualized cholecystostomy tube and pacer wires. Tube was secured in place to the patient's nose with an adhesive dressing. 12 [place] nasogastric tube placed successfully with its tip in the stomach. Physical Examination:        Physical Exam   Constitutional: She appears distressed (appears in pain). HENT:   Head: Normocephalic and atraumatic. Eyes: Pupils are equal, round, and reactive to light. Pulmonary/Chest: No respiratory distress. She has no wheezes. She has no rales. Abdominal: Soft. She exhibits no distension. There is tenderness (diffuse). Soft abdo   Musculoskeletal: She exhibits no edema (Edema resolved). Neurological: She is alert. Oriented to person and place   Skin: Skin is warm and dry. Capillary refill takes 2 to 3 seconds. She is not diaphoretic. Psychiatric:   Before entering room and leaving room, patient groans and verbalizes. When in room, patient is more calm and communicates. Vitals reviewed.      Vitals:    05/24/19 0344 05/24/19 0513 05/24/19 0745 05/24/19 0745   BP: (!) 99/52 (!) 98/59  (!) 100/54   Pulse: 105 106  106   Resp: 16 16 16 18   Temp: 100 °F (37.8 °C) 98.2 °F (36.8 °C)  98.4 °F (36.9 °C)   TempSrc: Oral Oral  Oral   SpO2: 99% 96% 99% 96%   Weight:       Height:         Assessment:        Primary Problem  Acute respiratory failure [facility])    Active Hospital Problems    Diagnosis Date Noted    [facility] [J96.00] 05/18/2019    Small bowel obstruction ([facility] [K56.609]     Anxiety disorder [F41.9]     Noncompliance [Z91.19]     Anemia [D64.9]     GI bleed [K92.2] 05/03/2019    Hemorrhage of rectum and anus [K62.5]     Centrilobular emphysema ([facility] [J43.2] 04/30/2019    CRP elevated [R79.82]     Elevated procalcitonin [R79.89]     Bandemia [D72.825]     Cholecystitis [K81.9]     Abdominal distention [R14.0]     Elevated CEA [R97.0]     Elevated CA 19-9 level [R97.8]     Urinary retention [R33.9]     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]     Septic shock (HCC) [A41.9, R65.21]     Leukemoid reaction [D72.823]     Aspiration pneumonia of right lower lobe due to vomit ([facility] [J69.0]     MRSA carrier [Z22.322]     Sepsis due to urinary tract infection ([facility] [A41.9, N39.0] 04/11/2019    Cystitis [N30.90] 04/11/2019     Plan:        Acute respiratory failure s/p ex-lap, open cholecystectomy, R colectomy w/ ileocolic anastomosis, extensive enterolysis, POD#9, improved  - Pulm following - duoneb QID  - Surgery following - FLD  - ID Following - ABx d/c'd  - C/w pain control PRN (PO orders placed, IV as needed)    Anemia 2/2 ABLA vs. AOCD, stable  - Hgb 10.6    Paroxysmal A.fib  - Cardiology following -Amio decreased to 100, rec ischemia workup as outpt, signing off  - EKG ordered to confirm NSR    Hypothyroid  - Levothyroxine 50mcg    DVT PPx  - Lovenox  Dispo  - PT/OT  - SW following - denied at [place], RN sent perfectserve to Dr. Darren Rosenthal for possible [id number]    Marcio Potter MD  5/24/2019  10:33 AM     Attestation and add on       I have discussed the care of Clark Ledesma , including pertinent history and exam findings,    today with the resident. I have seen and examined the patient and the key elements of all parts of the encounter have been performed by me . I agree with the assessment, plan and orders as documented by the resident. Principal Problem:    Acute respiratory failure (HCC)  Active Problems:    Sepsis due to urinary tract infection (HCC)    Cystitis    Emphysematous cystitis    Septic shock (HCC)    Leukemoid reaction    Aspiration pneumonia of right lower lobe due to vomit (HCC)    MRSA carrier    Urinary retention    Abdominal distention    Elevated CEA    Elevated CA 19-9 level    Cholecystitis    CRP elevated    Elevated procalcitonin    Bandemia    Centrilobular emphysema (HCC)    Hemorrhage of rectum and anus    GI bleed    Anemia    Small bowel obstruction (HCC)    Anxiety disorder    Noncompliance  Resolved Problems:    * No resolved hospital problems. *            ---- ;     [address]  [facility] [address]    Phone [phone]   Fax: [phone]  Answering Service: [phone]

## 2019-05-25 ENCOUNTER — ANESTHESIA (OUTPATIENT)
Dept: OPERATING ROOM | Age: 69
DRG: 853 | End: 2019-05-25
Payer: COMMERCIAL

## 2019-05-25 ENCOUNTER — ANESTHESIA EVENT (OUTPATIENT)
Dept: OPERATING ROOM | Age: 69
DRG: 853 | End: 2019-05-25
Payer: COMMERCIAL

## 2019-05-25 VITALS — DIASTOLIC BLOOD PRESSURE: 92 MMHG | TEMPERATURE: 98.1 F | OXYGEN SATURATION: 91 % | SYSTOLIC BLOOD PRESSURE: 199 MMHG

## 2019-05-25 LAB
ABSOLUTE BANDS #: 0.65 K/UL (ref 0–1)
ABSOLUTE EOS #: 0 K/UL (ref 0–0.4)
ABSOLUTE IMMATURE GRANULOCYTE: ABNORMAL K/UL (ref 0–0.3)
ABSOLUTE LYMPH #: 0.65 K/UL (ref 1–4.8)
ABSOLUTE MONO #: 0.78 K/UL (ref 0.1–1.3)
ALBUMIN SERPL-MCNC: 2.4 G/DL (ref 3.5–5.2)
ALBUMIN/GLOBULIN RATIO: ABNORMAL (ref 1–2.5)
ALP BLD-CCNC: 86 U/L (ref 35–104)
ALT SERPL-CCNC: 18 U/L (ref 5–33)
ANION GAP SERPL CALCULATED.3IONS-SCNC: 6 MMOL/L (ref 9–17)
AST SERPL-CCNC: 21 U/L
BANDS: 5 % (ref 0–10)
BASOPHILS # BLD: 0 % (ref 0–2)
BASOPHILS ABSOLUTE: 0 K/UL (ref 0–0.2)
BILIRUB SERPL-MCNC: 0.42 MG/DL (ref 0.3–1.2)
BUN BLDV-MCNC: 37 MG/DL (ref 8–23)
BUN/CREAT BLD: ABNORMAL (ref 9–20)
CALCIUM SERPL-MCNC: 7.8 MG/DL (ref 8.6–10.4)
CHLORIDE BLD-SCNC: 107 MMOL/L (ref 98–107)
CO2: 21 MMOL/L (ref 20–31)
CREAT SERPL-MCNC: <0.4 MG/DL (ref 0.5–0.9)
DIFFERENTIAL TYPE: ABNORMAL
EOSINOPHILS RELATIVE PERCENT: 0 % (ref 0–4)
GFR AFRICAN AMERICAN: ABNORMAL ML/MIN
GFR NON-AFRICAN AMERICAN: ABNORMAL ML/MIN
GFR SERPL CREATININE-BSD FRML MDRD: ABNORMAL ML/MIN/{1.73_M2}
GFR SERPL CREATININE-BSD FRML MDRD: ABNORMAL ML/MIN/{1.73_M2}
GLUCOSE BLD-MCNC: 104 MG/DL (ref 65–105)
GLUCOSE BLD-MCNC: 111 MG/DL (ref 65–105)
GLUCOSE BLD-MCNC: 111 MG/DL (ref 65–105)
GLUCOSE BLD-MCNC: 130 MG/DL (ref 65–105)
GLUCOSE BLD-MCNC: 95 MG/DL (ref 65–105)
GLUCOSE BLD-MCNC: 96 MG/DL (ref 65–105)
GLUCOSE BLD-MCNC: 96 MG/DL (ref 70–99)
HCT VFR BLD CALC: 28.4 % (ref 36–46)
HEMOGLOBIN: 9.2 G/DL (ref 12–16)
IMMATURE GRANULOCYTES: ABNORMAL %
LYMPHOCYTES # BLD: 5 % (ref 24–44)
MCH RBC QN AUTO: 28.3 PG (ref 26–34)
MCHC RBC AUTO-ENTMCNC: 32.3 G/DL (ref 31–37)
MCV RBC AUTO: 87.7 FL (ref 80–100)
METAMYELOCYTES ABSOLUTE COUNT: 0.13 K/UL
METAMYELOCYTES: 1 %
MONOCYTES # BLD: 6 % (ref 1–7)
MORPHOLOGY: ABNORMAL
MYELOCYTES ABSOLUTE COUNT: 0.26 K/UL
MYELOCYTES: 2 %
NRBC AUTOMATED: ABNORMAL PER 100 WBC
PDW BLD-RTO: 16.2 % (ref 11.5–14.9)
PLATELET # BLD: 217 K/UL (ref 150–450)
PLATELET ESTIMATE: ABNORMAL
PMV BLD AUTO: 9.3 FL (ref 6–12)
POTASSIUM SERPL-SCNC: 4.2 MMOL/L (ref 3.7–5.3)
RBC # BLD: 3.24 M/UL (ref 4–5.2)
RBC # BLD: ABNORMAL 10*6/UL
SEG NEUTROPHILS: 81 % (ref 36–66)
SEGMENTED NEUTROPHILS ABSOLUTE COUNT: 10.53 K/UL (ref 1.3–9.1)
SODIUM BLD-SCNC: 134 MMOL/L (ref 135–144)
TOTAL PROTEIN: 4.8 G/DL (ref 6.4–8.3)
WBC # BLD: 13 K/UL (ref 3.5–11)
WBC # BLD: ABNORMAL 10*3/UL

## 2019-05-25 PROCEDURE — 2709999900 HC NON-CHARGEABLE SUPPLY: Performed by: SURGERY

## 2019-05-25 PROCEDURE — 3E1M38Z IRRIGATION OF PERITONEAL CAVITY USING IRRIGATING SUBSTANCE, PERCUTANEOUS APPROACH: ICD-10-PCS | Performed by: SURGERY

## 2019-05-25 PROCEDURE — 2500000003 HC RX 250 WO HCPCS: Performed by: STUDENT IN AN ORGANIZED HEALTH CARE EDUCATION/TRAINING PROGRAM

## 2019-05-25 PROCEDURE — APPSS45 APP SPLIT SHARED TIME 31-45 MINUTES: Performed by: NURSE PRACTITIONER

## 2019-05-25 PROCEDURE — P9047 ALBUMIN (HUMAN), 25%, 50ML: HCPCS | Performed by: ANESTHESIOLOGY

## 2019-05-25 PROCEDURE — 7100000001 HC PACU RECOVERY - ADDTL 15 MIN: Performed by: SURGERY

## 2019-05-25 PROCEDURE — 2720000010 HC SURG SUPPLY STERILE: Performed by: SURGERY

## 2019-05-25 PROCEDURE — 0JQ80ZZ REPAIR ABDOMEN SUBCUTANEOUS TISSUE AND FASCIA, OPEN APPROACH: ICD-10-PCS | Performed by: SURGERY

## 2019-05-25 PROCEDURE — 2060000000 HC ICU INTERMEDIATE R&B

## 2019-05-25 PROCEDURE — 6360000002 HC RX W HCPCS: Performed by: SURGERY

## 2019-05-25 PROCEDURE — 2580000003 HC RX 258: Performed by: ANESTHESIOLOGY

## 2019-05-25 PROCEDURE — 6360000002 HC RX W HCPCS: Performed by: STUDENT IN AN ORGANIZED HEALTH CARE EDUCATION/TRAINING PROGRAM

## 2019-05-25 PROCEDURE — 3600000013 HC SURGERY LEVEL 3 ADDTL 15MIN: Performed by: SURGERY

## 2019-05-25 PROCEDURE — 80053 COMPREHEN METABOLIC PANEL: CPT

## 2019-05-25 PROCEDURE — 6370000000 HC RX 637 (ALT 250 FOR IP): Performed by: STUDENT IN AN ORGANIZED HEALTH CARE EDUCATION/TRAINING PROGRAM

## 2019-05-25 PROCEDURE — 94761 N-INVAS EAR/PLS OXIMETRY MLT: CPT

## 2019-05-25 PROCEDURE — 82947 ASSAY GLUCOSE BLOOD QUANT: CPT

## 2019-05-25 PROCEDURE — 99233 SBSQ HOSP IP/OBS HIGH 50: CPT | Performed by: INTERNAL MEDICINE

## 2019-05-25 PROCEDURE — 6360000002 HC RX W HCPCS: Performed by: ANESTHESIOLOGY

## 2019-05-25 PROCEDURE — 2500000003 HC RX 250 WO HCPCS: Performed by: ANESTHESIOLOGY

## 2019-05-25 PROCEDURE — 85025 COMPLETE CBC W/AUTO DIFF WBC: CPT

## 2019-05-25 PROCEDURE — C9113 INJ PANTOPRAZOLE SODIUM, VIA: HCPCS | Performed by: SURGERY

## 2019-05-25 PROCEDURE — 2500000003 HC RX 250 WO HCPCS: Performed by: SURGERY

## 2019-05-25 PROCEDURE — 3600000003 HC SURGERY LEVEL 3 BASE: Performed by: SURGERY

## 2019-05-25 PROCEDURE — 2580000003 HC RX 258: Performed by: SURGERY

## 2019-05-25 PROCEDURE — 0DH60UZ INSERTION OF FEEDING DEVICE INTO STOMACH, OPEN APPROACH: ICD-10-PCS | Performed by: SURGERY

## 2019-05-25 PROCEDURE — 99232 SBSQ HOSP IP/OBS MODERATE 35: CPT | Performed by: INTERNAL MEDICINE

## 2019-05-25 PROCEDURE — 3700000000 HC ANESTHESIA ATTENDED CARE: Performed by: SURGERY

## 2019-05-25 PROCEDURE — 3700000001 HC ADD 15 MINUTES (ANESTHESIA): Performed by: SURGERY

## 2019-05-25 PROCEDURE — 7100000000 HC PACU RECOVERY - FIRST 15 MIN: Performed by: SURGERY

## 2019-05-25 PROCEDURE — 6370000000 HC RX 637 (ALT 250 FOR IP): Performed by: SURGERY

## 2019-05-25 DEVICE — IMPLANTABLE DEVICE: Type: IMPLANTABLE DEVICE | Site: ABDOMEN | Status: FUNCTIONAL

## 2019-05-25 RX ORDER — MIDAZOLAM HYDROCHLORIDE 1 MG/ML
INJECTION INTRAMUSCULAR; INTRAVENOUS PRN
Status: DISCONTINUED | OUTPATIENT
Start: 2019-05-25 | End: 2019-05-25 | Stop reason: SDUPTHER

## 2019-05-25 RX ORDER — ROCURONIUM BROMIDE 10 MG/ML
INJECTION, SOLUTION INTRAVENOUS PRN
Status: DISCONTINUED | OUTPATIENT
Start: 2019-05-25 | End: 2019-05-25 | Stop reason: SDUPTHER

## 2019-05-25 RX ORDER — PROPOFOL 10 MG/ML
INJECTION, EMULSION INTRAVENOUS PRN
Status: DISCONTINUED | OUTPATIENT
Start: 2019-05-25 | End: 2019-05-25 | Stop reason: SDUPTHER

## 2019-05-25 RX ORDER — OXYCODONE HYDROCHLORIDE AND ACETAMINOPHEN 5; 325 MG/1; MG/1
2 TABLET ORAL PRN
Status: DISCONTINUED | OUTPATIENT
Start: 2019-05-25 | End: 2019-05-25 | Stop reason: HOSPADM

## 2019-05-25 RX ORDER — PROMETHAZINE HYDROCHLORIDE 25 MG/ML
6.25 INJECTION, SOLUTION INTRAMUSCULAR; INTRAVENOUS
Status: DISCONTINUED | OUTPATIENT
Start: 2019-05-25 | End: 2019-05-25 | Stop reason: SDUPTHER

## 2019-05-25 RX ORDER — ONDANSETRON 2 MG/ML
INJECTION INTRAMUSCULAR; INTRAVENOUS PRN
Status: DISCONTINUED | OUTPATIENT
Start: 2019-05-25 | End: 2019-05-25 | Stop reason: SDUPTHER

## 2019-05-25 RX ORDER — EPHEDRINE SULFATE 50 MG/ML
INJECTION, SOLUTION INTRAVENOUS PRN
Status: DISCONTINUED | OUTPATIENT
Start: 2019-05-25 | End: 2019-05-25 | Stop reason: SDUPTHER

## 2019-05-25 RX ORDER — SODIUM CHLORIDE 9 MG/ML
INJECTION, SOLUTION INTRAVENOUS CONTINUOUS PRN
Status: DISCONTINUED | OUTPATIENT
Start: 2019-05-25 | End: 2019-05-25 | Stop reason: SDUPTHER

## 2019-05-25 RX ORDER — FENTANYL CITRATE 50 UG/ML
50 INJECTION, SOLUTION INTRAMUSCULAR; INTRAVENOUS
Status: DISCONTINUED | OUTPATIENT
Start: 2019-05-25 | End: 2019-05-31 | Stop reason: HOSPADM

## 2019-05-25 RX ORDER — LABETALOL 20 MG/4 ML (5 MG/ML) INTRAVENOUS SYRINGE
5 EVERY 10 MIN PRN
Status: DISCONTINUED | OUTPATIENT
Start: 2019-05-25 | End: 2019-05-25 | Stop reason: HOSPADM

## 2019-05-25 RX ORDER — FENTANYL CITRATE 50 UG/ML
INJECTION, SOLUTION INTRAMUSCULAR; INTRAVENOUS PRN
Status: DISCONTINUED | OUTPATIENT
Start: 2019-05-25 | End: 2019-05-25 | Stop reason: SDUPTHER

## 2019-05-25 RX ORDER — DIPHENHYDRAMINE HYDROCHLORIDE 50 MG/ML
12.5 INJECTION INTRAMUSCULAR; INTRAVENOUS
Status: DISCONTINUED | OUTPATIENT
Start: 2019-05-25 | End: 2019-05-25 | Stop reason: HOSPADM

## 2019-05-25 RX ORDER — CLINDAMYCIN PHOSPHATE 150 MG/ML
INJECTION, SOLUTION INTRAVENOUS PRN
Status: DISCONTINUED | OUTPATIENT
Start: 2019-05-25 | End: 2019-05-25 | Stop reason: SDUPTHER

## 2019-05-25 RX ORDER — DEXAMETHASONE SODIUM PHOSPHATE 4 MG/ML
INJECTION, SOLUTION INTRA-ARTICULAR; INTRALESIONAL; INTRAMUSCULAR; INTRAVENOUS; SOFT TISSUE PRN
Status: DISCONTINUED | OUTPATIENT
Start: 2019-05-25 | End: 2019-05-25 | Stop reason: SDUPTHER

## 2019-05-25 RX ORDER — LIDOCAINE HYDROCHLORIDE 5 MG/ML
INJECTION, SOLUTION INFILTRATION; PERINEURAL PRN
Status: DISCONTINUED | OUTPATIENT
Start: 2019-05-25 | End: 2019-05-25 | Stop reason: SDUPTHER

## 2019-05-25 RX ORDER — OXYCODONE HYDROCHLORIDE AND ACETAMINOPHEN 5; 325 MG/1; MG/1
1 TABLET ORAL PRN
Status: DISCONTINUED | OUTPATIENT
Start: 2019-05-25 | End: 2019-05-25 | Stop reason: HOSPADM

## 2019-05-25 RX ORDER — CIPROFLOXACIN 2 MG/ML
400 INJECTION, SOLUTION INTRAVENOUS EVERY 12 HOURS
Status: DISCONTINUED | OUTPATIENT
Start: 2019-05-25 | End: 2019-05-27

## 2019-05-25 RX ORDER — FENTANYL CITRATE 50 UG/ML
25 INJECTION, SOLUTION INTRAMUSCULAR; INTRAVENOUS
Status: DISCONTINUED | OUTPATIENT
Start: 2019-05-25 | End: 2019-05-31 | Stop reason: HOSPADM

## 2019-05-25 RX ORDER — ALBUMIN (HUMAN) 12.5 G/50ML
SOLUTION INTRAVENOUS PRN
Status: DISCONTINUED | OUTPATIENT
Start: 2019-05-25 | End: 2019-05-25 | Stop reason: SDUPTHER

## 2019-05-25 RX ADMIN — LORAZEPAM 2 MG: 2 INJECTION INTRAMUSCULAR; INTRAVENOUS at 23:46

## 2019-05-25 RX ADMIN — ROCURONIUM BROMIDE 10 MG: 10 INJECTION INTRAVENOUS at 19:08

## 2019-05-25 RX ADMIN — AMIODARONE HYDROCHLORIDE 100 MG: 100 TABLET ORAL at 10:24

## 2019-05-25 RX ADMIN — MORPHINE SULFATE 4 MG: 4 INJECTION INTRAVENOUS at 03:27

## 2019-05-25 RX ADMIN — Medication 10 ML: at 13:26

## 2019-05-25 RX ADMIN — PHENYLEPHRINE HYDROCHLORIDE 200 MCG: 10 INJECTION INTRAVENOUS at 18:19

## 2019-05-25 RX ADMIN — FENTANYL CITRATE 25 MCG: 50 INJECTION, SOLUTION INTRAMUSCULAR; INTRAVENOUS at 20:13

## 2019-05-25 RX ADMIN — PHENYLEPHRINE HYDROCHLORIDE 100 MCG: 10 INJECTION INTRAVENOUS at 18:25

## 2019-05-25 RX ADMIN — Medication 2 PUFF: at 10:24

## 2019-05-25 RX ADMIN — ALBUMIN (HUMAN) 50 ML: 0.25 INJECTION, SOLUTION INTRAVENOUS at 18:56

## 2019-05-25 RX ADMIN — CIPROFLOXACIN 400 MG: 2 INJECTION, SOLUTION INTRAVENOUS at 23:46

## 2019-05-25 RX ADMIN — SODIUM CHLORIDE: 9 INJECTION, SOLUTION INTRAVENOUS at 17:56

## 2019-05-25 RX ADMIN — FENTANYL CITRATE 25 MCG: 50 INJECTION, SOLUTION INTRAMUSCULAR; INTRAVENOUS at 19:22

## 2019-05-25 RX ADMIN — PROPOFOL 100 MG: 10 INJECTION, EMULSION INTRAVENOUS at 18:02

## 2019-05-25 RX ADMIN — CLINDAMYCIN PHOSPHATE 900 MG: 150 INJECTION, SOLUTION INTRAMUSCULAR; INTRAVENOUS at 18:12

## 2019-05-25 RX ADMIN — FENTANYL CITRATE 50 MCG: 50 INJECTION, SOLUTION INTRAMUSCULAR; INTRAVENOUS at 18:52

## 2019-05-25 RX ADMIN — METOPROLOL TARTRATE 2.5 MG: 1 INJECTION, SOLUTION INTRAVENOUS at 13:27

## 2019-05-25 RX ADMIN — EPHEDRINE SULFATE 10 MG: 50 INJECTION INTRAMUSCULAR; INTRAVENOUS; SUBCUTANEOUS at 18:44

## 2019-05-25 RX ADMIN — POTASSIUM CHLORIDE: 2 INJECTION, SOLUTION, CONCENTRATE INTRAVENOUS at 22:00

## 2019-05-25 RX ADMIN — DEXAMETHASONE SODIUM PHOSPHATE 4 MG: 4 INJECTION, SOLUTION INTRA-ARTICULAR; INTRALESIONAL; INTRAMUSCULAR; INTRAVENOUS; SOFT TISSUE at 19:35

## 2019-05-25 RX ADMIN — MORPHINE SULFATE 4 MG: 4 INJECTION INTRAVENOUS at 09:13

## 2019-05-25 RX ADMIN — SUGAMMADEX 200 MG: 100 INJECTION, SOLUTION INTRAVENOUS at 19:54

## 2019-05-25 RX ADMIN — OXYCODONE HYDROCHLORIDE AND ACETAMINOPHEN 2 TABLET: 5; 325 TABLET ORAL at 13:27

## 2019-05-25 RX ADMIN — ONDANSETRON 4 MG: 2 INJECTION INTRAMUSCULAR; INTRAVENOUS at 19:59

## 2019-05-25 RX ADMIN — PHENYLEPHRINE HYDROCHLORIDE 100 MCG: 10 INJECTION INTRAVENOUS at 17:55

## 2019-05-25 RX ADMIN — SODIUM CHLORIDE: 9 INJECTION, SOLUTION INTRAVENOUS at 18:45

## 2019-05-25 RX ADMIN — I.V. FAT EMULSION 100 ML: 20 EMULSION INTRAVENOUS at 22:00

## 2019-05-25 RX ADMIN — FENTANYL CITRATE 50 MCG: 50 INJECTION, SOLUTION INTRAMUSCULAR; INTRAVENOUS at 19:38

## 2019-05-25 RX ADMIN — PHENYLEPHRINE HYDROCHLORIDE 200 MCG: 10 INJECTION INTRAVENOUS at 18:03

## 2019-05-25 RX ADMIN — LEVOTHYROXINE SODIUM 50 MCG: 50 TABLET ORAL at 05:26

## 2019-05-25 RX ADMIN — FENTANYL CITRATE 25 MCG: 50 INJECTION, SOLUTION INTRAMUSCULAR; INTRAVENOUS at 20:00

## 2019-05-25 RX ADMIN — LIDOCAINE HYDROCHLORIDE 50 MG: 5 INJECTION, SOLUTION INFILTRATION; PERINEURAL at 18:02

## 2019-05-25 RX ADMIN — METRONIDAZOLE 500 MG: 500 INJECTION, SOLUTION INTRAVENOUS at 22:41

## 2019-05-25 RX ADMIN — FENTANYL CITRATE 50 MCG: 50 INJECTION, SOLUTION INTRAMUSCULAR; INTRAVENOUS at 18:26

## 2019-05-25 RX ADMIN — EPHEDRINE SULFATE 5 MG: 50 INJECTION INTRAMUSCULAR; INTRAVENOUS; SUBCUTANEOUS at 19:06

## 2019-05-25 RX ADMIN — MIDAZOLAM 1 MG: 1 INJECTION INTRAMUSCULAR; INTRAVENOUS at 17:55

## 2019-05-25 RX ADMIN — ALBUMIN (HUMAN) 50 ML: 0.25 INJECTION, SOLUTION INTRAVENOUS at 19:13

## 2019-05-25 RX ADMIN — ROCURONIUM BROMIDE 40 MG: 10 INJECTION INTRAVENOUS at 18:02

## 2019-05-25 RX ADMIN — PHENYLEPHRINE HYDROCHLORIDE 200 MCG: 10 INJECTION INTRAVENOUS at 18:02

## 2019-05-25 RX ADMIN — METOPROLOL TARTRATE 2.5 MG: 1 INJECTION, SOLUTION INTRAVENOUS at 04:06

## 2019-05-25 RX ADMIN — PHENYLEPHRINE HYDROCHLORIDE 100 MCG: 10 INJECTION INTRAVENOUS at 18:22

## 2019-05-25 RX ADMIN — MORPHINE SULFATE 2 MG: 2 INJECTION, SOLUTION INTRAMUSCULAR; INTRAVENOUS at 16:52

## 2019-05-25 RX ADMIN — PANTOPRAZOLE SODIUM 40 MG: 40 INJECTION, POWDER, FOR SOLUTION INTRAVENOUS at 13:26

## 2019-05-25 RX ADMIN — FENTANYL CITRATE 50 MCG: 50 INJECTION INTRAMUSCULAR; INTRAVENOUS at 22:49

## 2019-05-25 RX ADMIN — PHENYLEPHRINE HYDROCHLORIDE 100 MCG: 10 INJECTION INTRAVENOUS at 18:35

## 2019-05-25 RX ADMIN — FENTANYL CITRATE 25 MCG: 50 INJECTION, SOLUTION INTRAMUSCULAR; INTRAVENOUS at 19:24

## 2019-05-25 ASSESSMENT — PULMONARY FUNCTION TESTS
PIF_VALUE: 12
PIF_VALUE: 23
PIF_VALUE: 14
PIF_VALUE: 16
PIF_VALUE: 13
PIF_VALUE: 14
PIF_VALUE: 14
PIF_VALUE: 13
PIF_VALUE: 13
PIF_VALUE: 1
PIF_VALUE: 14
PIF_VALUE: 13
PIF_VALUE: 13
PIF_VALUE: 14
PIF_VALUE: 15
PIF_VALUE: 12
PIF_VALUE: 16
PIF_VALUE: 14
PIF_VALUE: 15
PIF_VALUE: 14
PIF_VALUE: 15
PIF_VALUE: 14
PIF_VALUE: 14
PIF_VALUE: 1
PIF_VALUE: 13
PIF_VALUE: 17
PIF_VALUE: 13
PIF_VALUE: 15
PIF_VALUE: 13
PIF_VALUE: 15
PIF_VALUE: 14
PIF_VALUE: 3
PIF_VALUE: 14
PIF_VALUE: 1
PIF_VALUE: 14
PIF_VALUE: 1
PIF_VALUE: 14
PIF_VALUE: 32
PIF_VALUE: 14
PIF_VALUE: 1
PIF_VALUE: 14
PIF_VALUE: 1
PIF_VALUE: 14
PIF_VALUE: 1
PIF_VALUE: 14
PIF_VALUE: 17
PIF_VALUE: 1
PIF_VALUE: 4
PIF_VALUE: 14
PIF_VALUE: 23
PIF_VALUE: 15
PIF_VALUE: 13
PIF_VALUE: 3
PIF_VALUE: 14
PIF_VALUE: 14
PIF_VALUE: 15
PIF_VALUE: 14
PIF_VALUE: 13
PIF_VALUE: 14
PIF_VALUE: 15
PIF_VALUE: 15
PIF_VALUE: 14
PIF_VALUE: 14
PIF_VALUE: 16
PIF_VALUE: 15
PIF_VALUE: 14
PIF_VALUE: 14
PIF_VALUE: 13
PIF_VALUE: 14
PIF_VALUE: 15
PIF_VALUE: 13
PIF_VALUE: 14
PIF_VALUE: 13
PIF_VALUE: 3
PIF_VALUE: 13
PIF_VALUE: 13
PIF_VALUE: 16
PIF_VALUE: 1
PIF_VALUE: 13
PIF_VALUE: 14
PIF_VALUE: 13
PIF_VALUE: 14
PIF_VALUE: 14
PIF_VALUE: 13
PIF_VALUE: 15
PIF_VALUE: 13
PIF_VALUE: 13
PIF_VALUE: 12
PIF_VALUE: 14
PIF_VALUE: 4
PIF_VALUE: 15
PIF_VALUE: 14
PIF_VALUE: 13
PIF_VALUE: 14
PIF_VALUE: 14
PIF_VALUE: 16
PIF_VALUE: 14

## 2019-05-25 ASSESSMENT — PAIN DESCRIPTION - LOCATION: LOCATION: ABDOMEN

## 2019-05-25 ASSESSMENT — PAIN SCALES - GENERAL
PAINLEVEL_OUTOF10: 10
PAINLEVEL_OUTOF10: 8
PAINLEVEL_OUTOF10: 6
PAINLEVEL_OUTOF10: 9
PAINLEVEL_OUTOF10: 10

## 2019-05-25 ASSESSMENT — ENCOUNTER SYMPTOMS
ALLERGIC/IMMUNOLOGIC NEGATIVE: 1
ABDOMINAL PAIN: 1
CONSTIPATION: 0
VOMITING: 0
SORE THROAT: 0
NAUSEA: 0
SHORTNESS OF BREATH: 0
DIARRHEA: 0
EYE PAIN: 0
COUGH: 0
RHINORRHEA: 0
EYE DISCHARGE: 0

## 2019-05-25 ASSESSMENT — PAIN DESCRIPTION - DESCRIPTORS: DESCRIPTORS: ACHING

## 2019-05-25 NOTE — PLAN OF CARE
Problem: Falls - Risk of:  Goal: Will remain free from falls  Description  Will remain free from falls  5/25/2019 0422 by Caroline Mendoza RN  Outcome: Met This Shift  Note:   The patient remained free from falls this shift, call light within reach, bed in locked and lowest position. Side rails up x2. Continue to monitor closely. Problem: Falls - Risk of:  Goal: Absence of physical injury  Description  Absence of physical injury  5/25/2019 0422 by Caroline Mendoza RN  Outcome: Met This Shift     Problem: Risk for Impaired Skin Integrity  Goal: Tissue integrity - skin and mucous membranes  Description  Structural intactness and normal physiological function of skin and  mucous membranes. 5/25/2019 0422 by Caroline Mendoza RN  Outcome: Ongoing  Note:   The patient's skin remains dry and intact. Assist patient with turning Q2 hours and PRN. Problem: Pain:  Goal: Control of acute pain  Description  Control of acute pain  5/25/2019 0422 by Caroline Mendoza RN  Outcome: Ongoing  Note:   Pt medicated with pain medication prn. Assessed all pain characteristics including level, type, location, frequency, and onset. Non-pharmacologic interventions offered to pt as well. Pt states pain is tolerable at this time.  Will continue to monitor      Problem: Pain:  Goal: Pain level will decrease  Description  Pain level will decrease  5/25/2019 0422 by Caroline Mendoza RN  Outcome: Ongoing

## 2019-05-25 NOTE — FLOWSHEET NOTE
05/25/19 1145   Encounter Summary   Services provided to: Patient not available  (Asleep)   Referral/Consult From: Palliative Care

## 2019-05-25 NOTE — ANESTHESIA PRE PROCEDURE
Department of Anesthesiology  Preprocedure Note       Name:  Will Steiner   Age:  77 y.o.  :  1950                                          MRN:  [id number]         Date:  2019      Surgeon: Ardeen Crigler):  Frank Plasencia DO    Procedure: ABDOMEN DEBRIDEMENT WOUND CLOSURE (N/A Abdomen)  GASTROSTOMY TUBE PLACEMENT (N/A )    Medications prior to admission:   Prior to Admission medications    Medication Sig Start Date End Date Taking? Authorizing Provider   oxyCODONE-acetaminophen (PERCOCET) 5-325 MG per tablet Take 1 tablet by mouth every 6 hours as needed for Pain. Yes Historical Provider, MD   senna-docusate (PERICOLACE) 8.6-50 MG per tablet Take 1 tablet by mouth 2 times daily   Yes Historical Provider, MD   budesonide-formoterol (SYMBICORT) 160-4.5 MCG/ACT AERO Inhale 2 puffs into the lungs 2 times daily   Yes Historical Provider, MD   sucralfate (CARAFATE) 1 GM/10ML suspension Take 1 g by mouth 4 times daily    Yes Historical Provider, MD   traZODone (DESYREL) 50 MG tablet Take 50 mg by mouth nightly   Yes Historical Provider, MD   tiZANidine (ZANAFLEX) 2 MG tablet Take 2 mg by mouth every 8 hours as needed (left knee)    Yes Historical Provider, MD   aspirin 81 MG tablet Take 81 mg by mouth daily   Yes Historical Provider, MD   clopidogrel (PLAVIX) 75 MG tablet TAKE 1 TABLET DAILY 3/31/15  Yes Arianne Ponce DO   DOCQLACE 100 MG capsule TAKE 1 CAPSULE BY MOUTH IN THE MORNING & IN THE EVENING -15-A [facility] [address] TAKING THIS MEDICINE 3/31/15  Yes Arianne Ponce DO   amLODIPine (NORVASC) 10 MG tablet Take 10 mg by mouth daily. Yes Historical Provider, MD   LORazepam (ATIVAN) 0.5 MG tablet Take 0.5 mg by mouth every 6 hours as needed for Anxiety. Yes Historical Provider, MD   therapeutic multivitamin-minerals (THERAGRAN-M) tablet Take 1 tablet by mouth daily. Yes Historical Provider, MD   omeprazole (PRILOSEC) 20 MG capsule Take 20 mg by mouth daily.    Yes Historical Provider, MD venlafaxine (EFFEXOR) 37.5 MG tablet Take 37.5 mg by mouth 3 times daily. Yes Historical Provider, MD   Misc. Devices [facility] 2016 Alexi Santos wheelchair with left fupper extremity support  Dx: stroke with left hemiparesis.  7/24/17   Mariann Pallas, MD       Current medications:    Current Facility-Administered Medications   Medication Dose Route Frequency Provider Last Rate Last Dose    [MAR Hold] PN-Adult 2-in-1 Central Line (Standard)   Intravenous Continuous TPN Howie Estrada, DO        [MAR Hold] oxyCODONE-acetaminophen (PERCOCET) 5-325 MG per tablet 1 tablet  1 tablet Oral Q4H PRN Fazal Pratt MD        Or   Genelle Habermann Hold] oxyCODONE-acetaminophen (PERCOCET) 5-325 MG per tablet 2 tablet  2 tablet Oral Q4H PRN Fazal Pratt MD   2 tablet at 05/25/19 1327    [MAR Hold] morphine (PF) injection 2 mg  2 mg Intravenous Q2H PRN Deepak Jacobs MD   2 mg at 05/25/19 1652    Or    [MAR Hold] morphine sulfate (PF) injection 4 mg  4 mg Intravenous Q2H PRN Deepak Jacobs MD   4 mg at 05/25/19 0913    [MAR Hold] amiodarone (PACERONE) tablet 100 mg  100 mg Oral BID Liu Gallo MD   100 mg at 05/25/19 1024    [MAR Hold] PN-Adult 2-in-1 Central Line (Standard)   Intravenous Continuous TPN Faheem Corbett, DO 65 mL/hr at 05/24/19 1853      [MAR Hold] levothyroxine (SYNTHROID) tablet 50 mcg  50 mcg Oral Daily Deepak Jacobs MD   50 mcg at 05/25/19 0526    [MAR Hold] mirtazapine (REMERON SOL-TAB) disintegrating tablet 7.5 mg  7.5 mg Oral Nightly Fazal Pratt MD   7.5 mg at 05/23/19 2152    [MAR Hold] ipratropium-albuterol (DUONEB) nebulizer solution 1 ampule  1 ampule Inhalation 4x daily Soco Ly MD   1 ampule at 05/24/19 1205    [MAR Hold] enoxaparin (LOVENOX) injection 30 mg  30 mg Subcutaneous Daily Deepak Semenchenko, MD   30 mg at 05/24/19 0852    [MAR Hold] metoprolol (LOPRESSOR) injection 2.5 mg  2.5 mg Intravenous Q8H Liu Gallo MD   2.5 mg at 05/25/19 1327   Genelle Habermann Hold] LORazepam (ATIVAN) injection 2 mg  2 mg Intravenous Q30 Min PRN Ana Evangelista MD   2 mg at 05/24/19 0326    [MAR Hold] fat emulsion 20 % infusion 100 mL  100 mL Intravenous Daily Sabra Estrada DO   Stopped at 05/25/19 1010    [MAR Hold] glucose (GLUTOSE) 40 % oral gel 15 g  15 g Oral PRN Howie Estrada, DO        [facility] Hold] dextrose 50 % solution 12.5 g  12.5 g Intravenous PRN Howie Estrada, DO        [MAR Hold] glucagon (rDNA) injection 1 mg  1 mg Intramuscular PRN Wazbigniew  Natalie, DO        [facility] Hold] dextrose 5 % solution  100 mL/hr Intravenous PRN Howie Estrada DO        [facility] Hold] insulin lispro (HUMALOG) injection vial 0-6 Units  0-6 Units Subcutaneous Q6H oHwie Estrada DO   1 Units at 05/19/19 1716    [MAR Hold] sodium phosphate 7.62 mmol in dextrose 5 % 250 mL IVPB  0.16 mmol/kg Intravenous PRN Ty Levine MD   Stopped at 05/19/19 1333    Or    [MAR Hold] sodium phosphate 15.27 mmol in dextrose 5 % 250 mL IVPB  0.32 mmol/kg Intravenous PRN Ty Levine MD   Stopped at 05/18/19 0259    [MAR Hold] sodium chloride flush 0.9 % injection 10 mL  10 mL Intravenous 2 times per day Jonas Valverde, DO   10 mL at 05/24/19 2130    [MAR Hold] sodium chloride flush 0.9 % injection 10 mL  10 mL Intravenous PRN Sabra Estrada, DO   10 mL at 05/23/19 1625    [MAR Hold] ondansetron (ZOFRAN) injection 4 mg  4 mg Intravenous Q4H PRN Howie Estrada DO        [MAR Hold] lactated ringers infusion   Intravenous Continuous Gladys Rosas MD 10 mL/hr at 05/17/19 1917      [MAR Hold] potassium chloride 10 mEq/100 mL IVPB (Peripheral Line)  10 mEq Intravenous PRN Sabra Estrada  mL/hr at 05/19/19 1819 10 mEq at 05/19/19 1819    [MAR Hold] magnesium sulfate 1 g in dextrose 5% 100 mL IVPB  1 g Intravenous PRN Jonas Valverde DO   Stopped at 05/19/19 0921    [MAR Hold] pantoprazole (PROTONIX) injection 40 mg  40 mg Intravenous Daily Sabra Estrada DO   40 mg at 05/25/19 1326    And    [MAR Hold] History:        Procedure Laterality Date    CHOLECYSTECTOMY N/A 5/15/2019    CHOLECYSTECTOMY performed by Jonas Valverde DO at [address]  4/14/2019         LAPAROSCOPY N/A 5/15/2019    LAPAROSCOPY EXPLORATORY CONVERTED TO EXPLORATORY LAPAROTOMY/ RIGHT COLON RESECTION AND ANASTAMOSIS/ OPEN CHOLECYSTECTOMY/ EXTENSIVE LYSIS OF ADHESIONS performed by Jonas Valverde DO at [facility]  07/2011    Pacemaker is Medtronic Revo (compatible). Leads placed in 1995 are NOT MRI compatible. Placed at [address] V's per Dr. Bryson Sam can not have an MRI.     UPPER GASTROINTESTINAL ENDOSCOPY N/A 4/16/2019    EGD ESOPHAGOGASTRODUODENOSCOPY @ BEDSIDE  ICU 2002 [facility] [address] [facility] History:    Social History     Tobacco Use    Smoking status: Current Some Day Smoker     Packs/day: 1.00     Years: 30.00     Pack years: 30.00    Smokeless tobacco: Never Used   Substance Use Topics    Alcohol use: Not Currently     Alcohol/week: 16.8 oz     Types: 28 Glasses of wine per week                                Ready to quit: Not Answered  Counseling given: Not Answered      Vital Signs (Current):   Vitals:    05/25/19 0345 05/25/19 0654 05/25/19 0834 05/25/19 1323   BP: 102/67  (!) 146/70 (!) 120/59   Pulse: 87  91 92   Resp:  17 16 16   Temp:   99 °F (37.2 °C) 98.8 °F (37.1 °C)   TempSrc:   Oral Oral   SpO2:  96% 97% 98%   Weight:       Height:                                                  BP Readings from Last 3 Encounters:   05/25/19 (!) 120/59   05/15/19 (!) 144/70   04/02/19 129/81       NPO Status: Time of last liquid consumption: 2359                        Time of last solid consumption: 2359                        Date of last liquid consumption: 05/14/19                        Date of last solid food consumption: 05/14/19    BMI:   Wt Readings from Last 3 Encounters:   05/22/19 102 lb 4.7 oz (46.4 kg)   03/30/19 89 lb (40.4 kg)   03/28/19 94 lb 1.6 oz (42.7 kg)     Body mass index is 19.98 kg/m².     CBC:   Lab Results   Component Value Date    WBC 13.0 05/25/2019    RBC 3.24 05/25/2019    RBC 3.66 05/23/2012    HGB 9.2 05/25/2019    HCT 28.4 05/25/2019    MCV 87.7 05/25/2019    RDW 16.2 05/25/2019     05/25/2019     05/23/2012       CMP:   Lab Results   Component Value Date     05/25/2019    K 4.2 05/25/2019     05/25/2019    CO2 21 05/25/2019    BUN 37 05/25/2019    CREATININE <0.40 05/25/2019    GFRAA CANNOT BE CALCULATED 05/25/2019    LABGLOM CANNOT BE CALCULATED 05/25/2019    GLUCOSE 96 05/25/2019    GLUCOSE 87 05/21/2012    PROT 4.8 05/25/2019    CALCIUM 7.8 05/25/2019    BILITOT 0.42 05/25/2019    ALKPHOS 86 05/25/2019    AST 21 05/25/2019    ALT 18 05/25/2019       POC Tests:   Recent Labs     05/25/19  1714   POCGLU 111*       Coags:   Lab Results   Component Value Date    PROTIME 15.0 05/22/2019    PROTIME 10.9 03/28/2012    INR 1.2 05/22/2019    APTT 31.9 05/22/2019       HCG (If Applicable): No results found for: PREGTESTUR, PREGSERUM, HCG, HCGQUANT     ABGs:   Lab Results   Component Value Date    PHART 7.487 05/20/2019    PO2ART 62.2 05/20/2019    VVI0JXU 48.9 05/20/2019    TIP2OIG 37.0 05/20/2019    E0QWNFLO 92.3 05/20/2019        Type & Screen (If Applicable):  No results found for: LABABO, [address]    Anesthesia Evaluation  Patient summary reviewed and Nursing notes reviewed no history of anesthetic complications:   Airway: Mallampati: III  TM distance: >3 FB   Neck ROM: full  Mouth opening: > = 3 FB Dental:      Comment: Very poor dentition, denies loose teeth    Pulmonary:normal exam  breath sounds clear to auscultation  (+) pneumonia:  COPD:                             Cardiovascular:    (+) hypertension:, pacemaker: pacemaker, dysrhythmias: atrial fibrillation,       ECG reviewed  Rhythm: regular  Rate: normal  Echocardiogram reviewed                  Neuro/Psych:   (+) CVA:, psychiatric history:depression/anxiety ROS comment: Pt non ambulatory  Contracted mostly  Left arm swollen GI/Hepatic/Renal:   (+) GERD:,           Endo/Other:                      ROS comment: Wound dehiscence from Exp Laparatomy done on 5/15/19  Anemia - Hgb 9.2 Abdominal:           Vascular: negative vascular ROS. Anesthesia Plan      general     ASA 4 - emergent       Induction: intravenous. MIPS: Prophylactic antiemetics administered. Anesthetic plan and risks discussed with patient.                       Ajay Ulloa MD   5/25/2019

## 2019-05-25 NOTE — PROGRESS NOTES
[address] [facility]    PROGRESS NOTE             5/25/2019    8:48 AM    Name:   Chet Chang  MRN:     [id number]     Acct:      [de-identified]   Room:   2096/2096-01   Day:  40  Admit Date:  4/11/2019  2:48 PM    PCP:  Tamara Cox DO  Code Status:  Full Code    Subjective:     C/C:   Chief Complaint   Patient presents with    Abdominal Pain     Interval History Status: not changed. Patient seen bedside on progressive. No acute events overnight. VSS. Per surgery, possible wound vac. Pt is NPO. Waiting [id number] by Dr. Carlton Santoro. Possible LTACH at [facility]. Continue to monitor. Brief History:     S/p open ashley, ex lap, R colectomy w/ ileocolic anastomosis, excision small bowel diverticulum, extensive enterolysis. Intubated on vent, low pressor requirement, worsening edema, on solumedrol and albumin support. Extubated on 5/20. Edema improving, feeding transitioning to NGT.        Review of Systems:     Review of Systems   Constitutional: Negative for chills, fatigue and fever. Respiratory: Negative for cough and shortness of breath. Cardiovascular: Negative for chest pain. Gastrointestinal: Positive for abdominal pain. Negative for constipation, diarrhea, nausea and vomiting. Musculoskeletal: Negative for myalgias. Neurological: Negative for headaches. Medications: Allergies:     Allergies   Allergen Reactions    Penicillins Shortness Of Breath and Rash    Amoxicillin        Current Meds:   Scheduled Meds:    amiodarone  100 mg Oral BID    levothyroxine  50 mcg Oral Daily    mirtazapine  7.5 mg Oral Nightly    ipratropium-albuterol  1 ampule Inhalation 4x daily    enoxaparin  30 mg Subcutaneous Daily    metoprolol  2.5 mg Intravenous Q8H    fat emulsion  100 mL Intravenous Daily    insulin lispro  0-6 Units Subcutaneous Q6H    sodium chloride flush  10 mL Intravenous 2 times per day    pantoprazole  40 mg Intravenous Daily    And    sodium chloride (PF)  10 mL Intravenous Daily    alteplase  1 mg Intracatheter Once    mometasone-formoterol  2 puff Inhalation BID     Continuous Infusions:    PN-Adult 2-in-1 Central Line (Standard) 65 mL/hr at 19 1853    dextrose      lactated ringers 10 mL/hr at 19 1917     PRN Meds: oxyCODONE-acetaminophen **OR** oxyCODONE-acetaminophen, morphine **OR** morphine, LORazepam, glucose, dextrose, glucagon (rDNA), dextrose, sodium phosphate IVPB **OR** sodium phosphate IVPB, sodium chloride flush, ondansetron, potassium chloride, magnesium sulfate    Data:     Past Medical History:   has a past medical history of Abnormal computed tomography of cervical spine, CVA (cerebral vascular accident) ([address] GERD (gastroesophageal reflux disease), Hypertension, Paraproteinemia, and Weight loss. Social History:   reports that she has been smoking. She has a 30.00 pack-year smoking history. She has never used smokeless tobacco. She reports that she drank about 16.8 oz of alcohol per week. She reports that she does not use drugs. Family History: History reviewed. No pertinent family history. Vitals:  BP (!) 146/70   Pulse 91   Temp 99 °F (37.2 °C) (Oral)   Resp 16   Ht 5' (1.524 m)   Wt 102 lb 4.7 oz (46.4 kg)   SpO2 97%   BMI 19.98 kg/m²   Temp (24hrs), Av.3 °F (36.8 °C), Min:97.6 °F (36.4 °C), Max:99 °F (37.2 °C)    Recent Labs     19  0106 19  0531 19  0800   POCGLU 112* 95 104 96       I/O(24Hr):     Intake/Output Summary (Last 24 hours) at 2019 0848  Last data filed at 2019 0405  Gross per 24 hour   Intake 2878 ml   Output 1265 ml   Net 1613 ml       Labs:    Lab Results   Component Value Date    WBC 13.0 (H) 2019    HGB 9.2 (L) 2019    HCT 28.4 (L) 2019    MCV 87.7 2019     2019     Lab Results   Component Value Date     (L) 2019    K 4.2 05/25/2019     05/25/2019    CO2 21 05/25/2019    BUN 37 (H) 05/25/2019    CREATININE <0.40 (L) 05/25/2019    GLUCOSE 96 05/25/2019    CALCIUM 7.8 (L) 05/25/2019    PROT 4.8 (L) 05/25/2019    LABALBU 2.4 (L) 05/25/2019    BILITOT 0.42 05/25/2019    ALKPHOS 86 05/25/2019    AST 21 05/25/2019    ALT 18 05/25/2019    LABGLOM CANNOT BE CALCULATED 05/25/2019    GFRAA CANNOT BE CALCULATED 05/25/2019    GLOB NOT REPORTED 05/11/2019           Lab Results   Component Value Date/Time    SPECIAL R AC 5CC PURPLE 3CC RED 05/16/2019 12:20 PM     Lab Results   Component Value Date/Time    CULTURE NO GROWTH 6 DAYS 05/16/2019 12:20 PM       [unfilled]    Radiology:    Xr Chest (single View Frontal)    Result Date: 5/23/2019  EXAMINATION: ONE XRAY VIEW OF THE CHEST 5/23/2019 6:35 am COMPARISON: 05/20/2019 HISTORY: ORDERING SYSTEM PROVIDED HISTORY: Follow up pleural effusion TECHNOLOGIST PROVIDED HISTORY: Follow up pleural effusion Ordering Physician Provided Reason for Exam: follow up pleural effusion Acuity: Acute Type of Exam: Initial Follow-up FINDINGS: The cardiac silhouette and mediastinal contours are stable. The endotracheal tube and orogastric tube have been removed. Left-sided cardiac device and right PICC are stable. There are bilateral pleural effusions, left side greater than right, with associated bibasilar atelectasis, not appreciably changed. No new lung infiltrate. The visualized osseous structures are unremarkable. 1. Removal of the endotracheal tube and orogastric tube. 2. Stable small bilateral pleural effusions, left side greater than right, with associated bibasilar atelectasis.      Xr Chest (single View Frontal)    Result Date: 5/11/2019  EXAMINATION: ONE XRAY VIEW OF THE CHEST 5/11/2019 6:28 am COMPARISON: 10 May 2019 HISTORY: ORDERING SYSTEM PROVIDED HISTORY: Respiratory failure TECHNOLOGIST PROVIDED HISTORY: Respiratory failure Ordering Physician Provided Reason for Exam: Respiratory failure Acuity: Unknown Type of Exam: Unknown FINDINGS: AP portable view of the chest time stamped at 603 hours demonstrates findings of generalized COPD. Overlying cardiac monitoring electrodes are present. An intestinal tube extends below the fundus of the stomach, tip not included. Right central line terminates in the distal superior vena cava. Bipolar pacemaker enters from the left with intact leads in appropriate positions. Heart size is stable, top-normal.  Lung fields are hyperinflated suggestive of COPD. Interstitial markings are coarsened, similar to that noted in 2015 likely chronic interstitial change. There is improved aeration at the left lung base with slight blunting of the left costophrenic angle suggesting effusion. There is been interval clearing of right basilar opacities likely resolved atelectasis. Osseous and mediastinal structures are age-appropriate. No vascular congestion or extrapleural air is noted. Improved aeration of both lung bases with resolved right basilar opacity. Mild left basilar opacity persists with blunting of the left costophrenic angle with small effusion on the left suspected. Findings of COPD and suspected chronic fibrotic change are noted. No extrapleural air. Tubes and lines as above. Xr Chest (single View Frontal)    Result Date: 5/2/2019  EXAMINATION: SINGLE XRAY VIEW OF THE CHEST 5/2/2019 6:34 am COMPARISON: 29 April 2019 HISTORY: ORDERING SYSTEM PROVIDED HISTORY: COPD TECHNOLOGIST PROVIDED HISTORY: COPD Ordering Physician Provided Reason for Exam: COPD Acuity: Acute Type of Exam: Initial FINDINGS: AP portable view of the chest time stamped at 630 hours demonstrates stable cardiac size. Overlying cardiac monitoring electrodes are present. Right-sided PICC line terminates in the right atrium. Bipolar pacemaker enters from the left with intact leads in appropriate positions.   There is been no significant interval change in bilateral interstitial opacities, representing interval change since 2015. This may be related to worsening interstitial disease or superimposed venous congestion. Bilateral effusions are noted with bibasilar opacities, either atelectasis or edema. Continued bilateral effusions, bibasilar opacities and bilateral interstitial opacities in the upper lobes. Findings may be related to worsening interstitial disease and edema. Airspace disease is not excluded. Xr Chest Standard (2 Vw)    Result Date: 4/29/2019  EXAMINATION: TWO VIEWS OF THE CHEST 4/29/2019 8:04 pm COMPARISON: 04/27/2019 HISTORY: ORDERING SYSTEM PROVIDED HISTORY: Follow-up lung infiltrate TECHNOLOGIST PROVIDED HISTORY: Follow-up lung infiltrate Ordering Physician Provided Reason for Exam: Follow-up infiltrate. Pt unable to lean forward - unable to get proper sponge behind pt for lateral. Pt unable to raise left arm at all for lateral. Best possible lateral obtained. Acuity: Unknown Type of Exam: Unknown Additional signs and symptoms: Follow-up infiltrate. Pt unable to lean forward - unable to get proper sponge behind pt for lateral. Pt unable to raise left arm at all for lateral. Best possible lateral obtained. FINDINGS: Left chest cardiac pacemaker device is in place. Right IJ central venous catheter in place with distal tip at the cavoatrial junction. Heart size is within normal limits. No vascular congestion. There are small to moderate pleural effusions. There are interstitial opacities in the upper lungs, which could be related to scarring and/or developing infiltrate, slightly improved in appearance when compared to 04/27/2019. No evidence of pneumothorax. 1.  Small to moderate bilateral pleural effusions, better visualized on lateral view. 2.  Upper lobe interstitial opacities, slightly improved when compared to the previous study, possibly related to underlying fibrotic change or residual infiltrate.      Xr Knee Left (1-2 Views)    Result Date: 5/8/2019  EXAMINATION: 2 XRAY VIEWS OF THE LEFT KNEE 5/7/2019 11:19 am COMPARISON: Left knee radiographs 03/30/2019. HISTORY: ORDERING SYSTEM PROVIDED HISTORY: fracture TECHNOLOGIST PROVIDED HISTORY: fracture Ordering Physician Provided Reason for Exam: left knee/distal femur pain Acuity: Acute Type of Exam: Initial FINDINGS: Bones are osteopenic. No suprapatellar joint effusion. There is a impacted, transverse fracture of the distal femoral metadiaphysis. Increased sclerosis along the fracture line suggests interval healing. Fracture fragments are in unchanged, near anatomic alignment. No acute dislocation. No new fracture is seen. Impacted, transverse fracture of the distal femoral metadiaphysis is in unchanged alignment and demonstrates interval healing. Xr Abdomen (kub) (single Ap View)    Result Date: 5/21/2019  EXAMINATION: ONE SUPINE XRAY VIEW(S) OF THE ABDOMEN 5/21/2019 6:52 am COMPARISON: May 10, 2018, May 9, 2018 HISTORY: ORDERING SYSTEM PROVIDED HISTORY: ileus TECHNOLOGIST PROVIDED HISTORY: ileus Ordering Physician Provided Reason for Exam: abd pain Acuity: Unknown Type of Exam: Unknown FINDINGS: Left lower lobe airspace disease and/or effusion has increased in the interval.  Dual lead pacer in place. Enteric tube terminates in the region of the stomach. Interval midline incision with skin staples. Surgical clips in the right upper quadrant are noted. A drain projects in the right pelvis. Nonobstructive bowel gas pattern. Interval increase in left lower lobe atelectasis or consolidation with effusion. Enteric tube terminates in the region of the body of the stomach. Nonobstructive bowel gas pattern.      Xr Abdomen (kub) (single Ap View)    Result Date: 5/10/2019  EXAMINATION: ONE SUPINE XRAY VIEW(S) OF THE ABDOMEN 5/10/2019 9:14 am COMPARISON: Small-bowel series 05/09/2019 HISTORY: ORDERING SYSTEM PROVIDED HISTORY: Small bowel obstruction ([address] TECHNOLOGIST PROVIDED HISTORY: TO ACCOMPANY SMALL BOWEL EXAM SMALL BOWEL OBSTRUCTION Ordering Physician Provided Reason for Exam: SMALL BOWEL FOLLOW THRU - 20 HOUR DELAYED FILM FINDINGS: Significant interval decreased amount of retained contrast in the stomach compared to last image from earlier small bowel series which likely relates to suctioning of the contrast.  Majority of previously seen contrast within the pelvis likely in the rectum is no longer visualized and has likely passed through. Residual contrast remains within the colon and small bowel. NG tube is in place with side hole in the region of the GE junction. Partially visualized mild left pleural effusion associated atelectasis. 1. Interval presumed suctioning of contrast from the stomach which now appears relatively empty. Continued progression of contrast through the small and large bowel as described. 2. NG tube side hole at the level of the GE junction, consider advancement of 2-3 cm. Xr Abdomen (kub) (single Ap View)    Result Date: 5/8/2019  EXAMINATION: SINGLE SUPINE XRAY VIEW(S) OF THE ABDOMEN 5/8/2019 8:59 am COMPARISON: CT abdomen from 05/05/2019, and KUB from 04/19/2019 HISTORY: ORDERING SYSTEM PROVIDED HISTORY: recheck obstruction TECHNOLOGIST PROVIDED HISTORY: recheck obstruction Ordering Physician Provided Reason for Exam: recheck obstruction Acuity: Unknown Type of Exam: Unknown FINDINGS: Again noted cholecystostomy tube, electrode leads from a pacemaker overlying the heart, and overlying electrode leads/tubing. NG tube no longer demonstrated. Gas-filled bowel loops without marked dilatation; cecum measures 8 cm, slightly increased as compared to the previous study. No obvious free air. No mass or organomegaly. Bones unchanged. Retrocardiac opacity, hyperinflated lungs and probable pleural effusions. NG tube removed. Gas-filled bowel more suggestive ileus; no clear cut obstruction. Cecum measures 8 cm. Cholecystostomy tube in unchanged position. TECHNIQUE: Following the intravenous injection of 23.8 mCi of 99 mTc-labeled RBC's, a flow study and standard images of the abdomen was obtained over a total period of 60 minutes COMPARISON: None. HISTORY: ORDERING SYSTEM PROVIDED HISTORY: GI BLEEDING TECHNOLOGIST PROVIDED HISTORY: Ordering Physician Provided Reason for Exam: GI bleeding Acuity: Unknown Type of Exam: Unknown FINDINGS: Activity is seen within the blood pool, including the great vessels, heart, liver, and spleen. There was no abnormal accumulation of radioactivity in the abdomen to suggest active bleeding during study acquisition. No evidence of active GI bleeding during acquisition. RECOMMENDATIONS: If the patient shows hemodynamic signs of an active bleed in the next 20 hours, additional images can be acquired. Kate Herron Device Plmt/replace/removal    Result Date: 4/30/2019  PROCEDURE: ULTRASOUND GUIDED VASCULAR ACCESS. FLUOROSCOPY GUIDED PICC PLACEMENT 4/30/2019. HISTORY: ORDERING SYSTEM PROVIDED HISTORY: TPN TECHNOLOGIST PROVIDED HISTORY: TPN Lumen?->Double Lumen SEDATION: None FLUOROSCOPY DOSE AND TYPE OR TIME AND EXPOSURES: 7 seconds; D  TECHNIQUE: This procedure was performed by Dr. Philip Douglas. Informed consent was obtained after a detailed explanation of the procedure including risks, benefits, and alternatives. Universal protocol was observed. The right arm was prepped and draped in sterile fashion using maximum sterile barrier technique. Local anesthesia was achieved with lidocaine. A micropuncture needle was used to access the right basilic vein using ultrasound guidance. An ultrasound image demonstrating patency of the vein with needle tip located within it. An image was obtained and stored in PACs. A 0.018 guidewire was used to place a peel-a-way sheath and a 5 Wolof dual-lumen PICC was advanced with fluoroscopic guidance with the tip at the cavo-atrial junction.   The catheter flushed easily and there was a good blood return. The catheter was secured to the skin. The patient tolerated the procedure well and there were no immediate complications. FINDINGS: Fluoroscopic image demonstrates the tip of the catheter at the cavo-atrial junction. Successful ultrasound and fluoroscopy guided PICC placement     Xr Chest Portable    Result Date: 5/20/2019  EXAMINATION: ONE XRAY VIEW OF THE CHEST 5/20/2019 6:03 am COMPARISON: 05/19/2019 HISTORY: ORDERING SYSTEM PROVIDED HISTORY: Vent TECHNOLOGIST PROVIDED HISTORY: Vent Ordering Physician Provided Reason for Exam: on vent Acuity: Acute Type of Exam: Initial FINDINGS: Endotracheal tube terminates 2.5 cm above the ron. Enteric tube courses below left hemidiaphragm. Right upper extremity PICC terminates overlying the expected location of the cavoatrial junction. Implantable cardiac device leads overlying the right atrium and ventricle. Small to moderate left and small right pleural effusions with adjacent airspace disease, likely atelectasis. Generalized osteopenia. Dense atherosclerotic calcifications of the aortic arch. Small to moderate left and small right pleural effusions with adjacent airspace disease, likely atelectasis. Support tubes and lines in grossly unchanged positioning. Xr Chest Portable    Result Date: 5/19/2019  EXAMINATION: ONE XRAY VIEW OF THE CHEST 5/19/2019 6:01 am COMPARISON: 05/18/2019 HISTORY: ORDERING SYSTEM PROVIDED HISTORY: Vent TECHNOLOGIST PROVIDED HISTORY: Vent Ordering Physician Provided Reason for Exam: Check vent placement Acuity: Unknown Type of Exam: Unknown Follow-up. FINDINGS: The cardiac silhouette and mediastinal contours are stable. Vascular calcifications are noted along the aortic arch. Support tubes and lines are unchanged. Left-sided cardiac device is stable. There is mild pulmonary vascular congestion. There is an infiltrate in the left lung base, likely related to pleural effusion with associated atelectasis, stable. There is a tiny right pleural effusion with minimal right basilar atelectasis. No new lung infiltrate. 1. Stable pulmonary vascular congestion. 2. Small bilateral pleural effusions with associated bibasilar atelectasis, left side greater than right, unchanged. Xr Chest Portable    Result Date: 5/18/2019  EXAMINATION: ONE XRAY VIEW OF THE CHEST 5/18/2019 6:37 am COMPARISON: Chest radiograph performed 05/17/2019. HISTORY: ORDERING SYSTEM PROVIDED HISTORY: Vent TECHNOLOGIST PROVIDED HISTORY: Vent Ordering Physician Provided Reason for Exam: Post Extubation Acuity: Chronic Type of Exam: Ongoing FINDINGS: There is mild bilateral congestion. There are bibasilar effusions with the left being larger than the right. There is no pneumothorax. Heart is prominent with stable cardiac leads. The upper abdomen is unremarkable. There is a right-sided PICC line. There is endotracheal tube in stable position. There is a gastric tube in stable position. Cardiomegaly and bilateral pulmonary congestion and bibasilar effusions. Similar-appearing support tubes. Xr Chest Portable    Result Date: 5/17/2019  EXAMINATION: ONE XRAY VIEW OF THE CHEST 5/17/2019 6:59 am COMPARISON: May 16, 2019 HISTORY: ORDERING SYSTEM PROVIDED HISTORY: Vent TECHNOLOGIST PROVIDED HISTORY: Vent Ordering Physician Provided Reason for Exam: on vent Acuity: Acute Type of Exam: Initial FINDINGS: ET tube is in slightly improved position with the tip projecting 5 cm from the ron. .  NG tube beneath the diaphragm. Cardiac monitoring leads overlie the chest.  Right upper extremity PICC in good position. Implanted cardiac device is present. Calcified plaquing of the aorta. Borderline cardiomegaly. Central vascular congestion. Left-sided effusion and associated airspace disease, stable from prior. No pneumothorax. Stable chronic pulmonary changes. Stable left basilar pleural-parenchymal process. Slightly improved ET tube position.  Chronic pulmonary changes. Xr Chest Portable    Addendum Date: 5/16/2019    ADDENDUM: The ET tube is high and should be advanced by approximately 4 cm. The findings were sent to the Radiology Results [address] at 8:28 am on 5/16/2019to be communicated to a licensed caregiver. Result Date: 5/16/2019  EXAMINATION: ONE XRAY VIEW OF THE CHEST 5/16/2019 6:33 am COMPARISON: 15 May 2019 HISTORY: ORDERING SYSTEM PROVIDED HISTORY: Vent TECHNOLOGIST PROVIDED HISTORY: Vent Ordering Physician Provided Reason for Exam: Vent pt check ETT Acuity: Acute Type of Exam: Subsequent/Follow-up Additional signs and symptoms: Vent pt check ETT FINDINGS: AP portable view of the chest time stamped at 1614 hours demonstrates overlying cardiac monitoring electrodes. An endotracheal tube is noted in situ, terminating approximately 8 cm above the ron. Intestinal tube extends below the diaphragm, tip not included on the study. Skin clips are present upper abdomen. Right-sided PICC line terminates in the distal superior vena cava. Bipolar pacemaker enters from the left with intact leads in appropriate positions. Heart size is normal. Findings of COPD are redemonstrated with coarsened interstitial markings likely chronic fibrosis. Interval decrease in free air in the abdomen. Left effusion is redemonstrated with opacity at the left base likely atelectasis. Osseous and mediastinal structures are unchanged. Continued left effusion and left basilar opacity. Interval decrease in intraperitoneal air. Surgical skin clips are present in the upper abdominal midline. Tubes and lines as above. There is been little change in the appearance of the lung field since prior study.      Xr Chest Portable    Result Date: 5/15/2019  EXAMINATION: ONE XRAY VIEW OF THE CHEST 5/15/2019 4:40 pm COMPARISON: 05/13/2019 HISTORY: ORDERING SYSTEM PROVIDED HISTORY: ETT PLACEMENT TECHNOLOGIST PROVIDED HISTORY: PT IN PACU ETT PLACEMENT Recent abdominal surgery FINDINGS: Endotracheal tube tip is approximately 6 cm above the ron. Nasogastric tube tip is below the diaphragm but not included on this study. There are skin staples in the upper abdomen. Dual-chamber left subclavian pacemaker and right arm PICC line remain in place. Again shown are COPD changes with prominent interstitial markings. No significant change in left basilar airspace disease with left pleural effusion. Haziness at the right base suggests a small effusion and atelectasis. The bones are osteopenic. There appears to be a small amount of free intraperitoneal air in the midline upper abdomen related to the recent surgical procedure. Endotracheal tube tip is approximately 6 cm above the ron. Xr Chest Portable    Result Date: 5/13/2019  EXAMINATION: ONE XRAY VIEW OF THE CHEST 5/13/2019 6:25 am COMPARISON: May 11, 2019 HISTORY: ORDERING SYSTEM PROVIDED HISTORY: Follow up aspiration pneumonia TECHNOLOGIST PROVIDED HISTORY: Follow up aspiration pneumonia Ordering Physician Provided Reason for Exam: follow up aspiration pneumonia Acuity: Acute Type of Exam: Initial FINDINGS: Implanted cardiac device is present. Stable cardiomediastinal contours. Extensively calcified aorta. Right upper extremity PICC in good position. Left basilar atelectasis with airspace disease and effusion, increased from prior. No pneumothorax. COPD with prominent interstitial markings. Removal of NG tube since prior exam.     New left effusion and associated airspace disease. Differential includes atelectasis versus pneumonia. Xr Chest Portable    Result Date: 5/10/2019  EXAMINATION: ONE XRAY VIEW OF THE CHEST 5/10/2019 1:28 am COMPARISON: May 2, 2019. HISTORY: ORDERING SYSTEM PROVIDED HISTORY: low o2 sat TECHNOLOGIST PROVIDED HISTORY: low o2 sat Ordering Physician Provided Reason for Exam: Low o2 sat Acuity: Acute Type of Exam: Initial FINDINGS: Hyperexpanded right lung volume.   Left greater than right basilar heterogeneous opacities with left basilar consolidation. Small bilateral pleural effusions. Stable cardiomediastinal silhouette and great vessels. Enteric contrast in the stomach and distal esophagus. Enteric tube courses into the stomach but tip is not definitely seen due to contrast in the stomach. Stable left pectoral cardiac pacer device. Stable right PICC. Left greater than right basilar heterogeneous opacities with left basilar consolidation. Differential considerations include atelectasis and an infectious/inflammatory process. Small bilateral pleural effusions. Enteric contrast in the stomach and distal esophagus. Enteric tube courses into the stomach but tip is not definitely seen due to contrast in the stomach. Xr Chest Portable    Result Date: 4/27/2019  EXAMINATION: SINGLE XRAY VIEW OF THE CHEST 4/27/2019 8:47 am COMPARISON: 04/26/2019 HISTORY: ORDERING SYSTEM PROVIDED HISTORY: Assess for pulm edema vs PNA TECHNOLOGIST PROVIDED HISTORY: Assess for pulm edema vs PNA Ordering Physician Provided Reason for Exam: cough, congestion Acuity: Acute Type of Exam: Initial FINDINGS: Right IJ central venous catheter is in place tip in the SVC right atrial junction. Pacer wires are in place. The heart and mediastinal structures are stable. Pleural effusions are present with bibasilar atelectasis. Bilateral lung infiltrates are present in the upper lung fields. Persistent pleural effusions with bibasilar atelectasis and bilateral lung infiltrates with areas of consolidation developing in the upper lobes.      Xr Chest Portable    Result Date: 4/26/2019  EXAMINATION: SINGLE XRAY VIEW OF THE CHEST 4/26/2019 6:51 am COMPARISON: April 24, 2019 HISTORY: ORDERING SYSTEM PROVIDED HISTORY: Pleural effusions TECHNOLOGIST PROVIDED HISTORY: Pleural effusions Ordering Physician Provided Reason for Exam: pleural effusion Acuity: Acute Type of Exam: Initial FINDINGS: Right IJ line in good position. Pacer device is stable. Cardiac leads overlie the chest.  Stable cardiomediastinal contours with calcified aortic arch. Left effusion and associated airspace disease, slightly decreased. Chronic blunting of right costophrenic sulcus may represent scarring or chronic effusion. Increased reticular markings right upper chest and left upper chest possibly interstitial edema or interstitial pneumonia. No new airspace consolidation. Increasing reticular markings upper chest bilaterally right greater than left possibly due to edema or interstitial pneumonitis. Improving left effusion with associated retrocardiac airspace disease. Pleural-parenchymal scarring or effusion in the right lung base, stable. Vl Dup Lower Extremity Venous Bilateral    Result Date: 5/7/2019    [facility]  Vascular Lower Extremities DVT Study Procedure   Patient Name   [facility] [place]     Date of Study           05/07/2019 Maye Listen   Date of Birth  1950  Gender                  Female   Age            77 year(s)  Race                       Room Number    2123        Height:                 59.84 inch, 152 cm   Corporate ID # [id number]    Weight:                 102 pounds, 46.3 kg   Patient Acct # [de-identified]   BSA:        1.4 m^2     BMI:      20.03 kg/m^2   MR #           [id number]      Sonographer             Vale Castanon, T   Accession #    [id number]   Interpreting Physician  Maribel Payton   Referring                  Referring Physician     Alee Mcclelland MD  Nurse  Practitioner  Procedure Type of Study:   Veins: Lower Extremities DVT Study, Venous Scan Lower Bilateral.  Indications for Study:Pain and swelling. Patient Status: In Patient. Technical Quality:Limited visualization. Limitation reason:patient movement. Conclusions   Summary   No evidence of superficial or deep venous thrombosis in both lower  extremities. Technically limited visualization.    Signature ----------------------------------------------------------------  Electronically signed by Jabari Schwartz RVT(Sonographer) on  05/07/2019 01:22 PM  ----------------------------------------------------------------   ----------------------------------------------------------------  Electronically signed by Jovita Oliveira(Interpreting  physician) on 05/07/2019 06:45 PM  ----------------------------------------------------------------  Findings:   Right Impression:                    Left Impression:  The common femoral, femoral,         The common femoral, femoral,  popliteal and tibial veins           popliteal and tibial veins  demonstrate normal compressibility   demonstrate normal compressibility  and augmentation. and augmentation. Limited visualization of the calf    Limited visualization of the calf  veins. veins. Normal compressibility of the great  Normal compressibility of the great  saphenous vein. saphenous vein. Normal compressibility of the small  Normal compressibility of the small  saphenous vein. saphenous vein. Velocities are measured in cm/s ; Diameters are measured in cm Right Lower Extremities DVT Study Measurements Right 2D Measurements +------------------------------------+----------+---------------+----------+ ! Location                            ! Visualized! Compressibility! Thrombosis! +------------------------------------+----------+---------------+----------+ ! Common Femoral                      !Yes       ! Yes            ! None      ! +------------------------------------+----------+---------------+----------+ ! Prox Femoral                        !Yes       ! Yes            ! None      ! +------------------------------------+----------+---------------+----------+ ! Mid Femoral                         !Yes       ! Yes            ! None      ! +------------------------------------+----------+---------------+----------+ ! Dist Femoral                        !Yes       ! Yes            ! None      ! +------------------------------------+----------+---------------+----------+ ! Deep Femoral                        !Yes       ! Yes            ! None      ! +------------------------------------+----------+---------------+----------+ ! Popliteal                           !Yes       ! Yes            ! None      ! +------------------------------------+----------+---------------+----------+ ! Sapheno Femoral Junction            ! Yes       ! Yes            ! None      ! +------------------------------------+----------+---------------+----------+ ! PTV                                 ! Partial   !Yes            ! None      ! +------------------------------------+----------+---------------+----------+ ! Peroneal                            !Partial   !Yes            ! None      ! +------------------------------------+----------+---------------+----------+ ! Gastroc                             ! Yes       ! Yes            ! None      ! +------------------------------------+----------+---------------+----------+ ! GSV Thigh                           ! Yes       ! Yes            ! None      ! +------------------------------------+----------+---------------+----------+ ! GSV Knee                            ! Yes       ! Yes            ! None      ! +------------------------------------+----------+---------------+----------+ ! GSV Ankle                           ! Yes       ! Yes            ! None      ! +------------------------------------+----------+---------------+----------+ ! SSV                                 ! Partial   !Yes            ! None      ! +------------------------------------+----------+---------------+----------+ Left Lower Extremities DVT Study Measurements Left 2D Measurements +------------------------------------+----------+---------------+----------+ ! Location +------------------------------------+----------+---------------+----------+ ! GSV Ankle                           ! Yes       ! Yes            ! None      ! +------------------------------------+----------+---------------+----------+ ! SSV                                 ! Partial   !Yes            ! None      ! +------------------------------------+----------+---------------+----------+    Fl Small Bowel Follow Through Only    Result Date: 5/10/2019  EXAMINATION: SMALL BOWEL FOLLOW THROUGH SERIES 5/9/2019 TECHNIQUE: Small bowel follow through series was performed with overhead images. FLUOROSCOPY DOSE AND TYPE OR TIME AND EXPOSURES: No fluoroscopic images obtained. COMPARISON: CT abdomen pelvis 05/05/2019 HISTORY: ORDERING SYSTEM PROVIDED HISTORY: internal hernia TECHNOLOGIST PROVIDED HISTORY: Water soluble contrast only. Study to be done ONLY if NGT is placed by IR internal hernia FINDINGS:  image demonstrates NG tube overlying the left side of the abdomen within the stomach. Surgical drain overlies the right side of the abdomen. There is a nonspecific bowel gas pattern with mild dilated loops of bowel in lower abdomen. Initial images demonstrate filling of the stomach. At 1 hour and 45 minutes no significant contrast is noted in the small bowel. The 4-1/2 hour image demonstrates contrast within loops of bowel within the mid and lower abdomen and pelvis. There appears to be contrast extending to the colon in the right side of the abdomen. Contrast is noted within the pelvis which may be within the bladder or rectum. Significant delay in emptying of the stomach with persistent contrast noted at the 6 hour concha. There is contrast extending into the bowel which appears to be in the colon. Subtle findings are limited. Consider follow-up radiograph of the abdomen in 6-8 hours.      Ir Place Ng Tube By Dr Tavo Brandon    Result Date: 5/9/2019  PROCEDURE: XR PLACE NASOGASTRIC TUBE PHYS 5/9/2019 HISTORY: ORDERING SYSTEM PROVIDED HISTORY: ileus TECHNOLOGIST PROVIDED HISTORY: ileus Ordering Physician Provided Reason for Exam: ILEUS Acuity: Unknown Type of Exam: Unknown CONTRAST: None SEDATION: None FLUOROSCOPY DOSE AND TYPE OR TIME AND EXPOSURES: 21 seconds; D AP 46 DESCRIPTION OF PROCEDURE AND FINDINGS: This procedure was performed by Dr. Herminio Nixon. Under intermittent fluoroscopic guidance a 12 Tajik nasogastric tube was placed through the right nostril and directed under fluoroscopy into the stomach. A small amount of air was injected confirming the tip location in the stomach. Partially visualized cholecystostomy tube and pacer wires. Tube was secured in place to the patient's nose with an adhesive dressing. 12 Tajik nasogastric tube placed successfully with its tip in the stomach. Physical Examination:        Physical Exam   Constitutional: She is oriented to person, place, and time. HENT:   Mouth/Throat: Oropharynx is clear and moist.   Eyes: Conjunctivae are normal.   Neck: Neck supple. Cardiovascular: Normal rate, regular rhythm and normal heart sounds. Pulmonary/Chest: Effort normal and breath sounds normal.   Abdominal: Soft. She exhibits no distension. There is no tenderness. Musculoskeletal: She exhibits no edema or tenderness. Neurological: She is alert and oriented to person, place, and time. Skin: Skin is warm and dry. Nursing note and vitals reviewed.         Assessment:        Primary Problem  Acute respiratory failure [facility])    Active Hospital Problems    Diagnosis Date Noted    Chronic obstructive pulmonary disease, unspecified ([facility] [J44.9] 05/24/2019    Acute respiratory failure (HCC) [J96.00] 05/18/2019    Small bowel obstruction ([facility] [K56.609]     Anxiety disorder [F41.9]     Noncompliance [Z91.19]     Anemia [D64.9]     GI bleed [K92.2] 05/03/2019    Hemorrhage of rectum and anus [K62.5]     Centrilobular emphysema ([facility] [J43.2] 04/30/2019    CRP elevated [R79.82]     Elevated procalcitonin [R79.89]     Bandemia [D72.825]     Cholecystitis [K81.9]     Abdominal distention [R14.0]     Elevated CEA [R97.0]     Elevated CA 19-9 level [R97.8]     Urinary retention [R33.9]     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]     Septic shock (HCC) [A41.9, R65.21]     Leukemoid reaction [D72.823]     Aspiration pneumonia of right lower lobe due to vomit ([address] [J69.0]     MRSA carrier [Z22.322]     Sepsis due to urinary tract infection ([address] [A41.9, N39.0] 04/11/2019    Cystitis [N30.90] 04/11/2019       Plan:        Acute respiratory failure s/p POD #10 ex-lap, open cholecystectomy, right colectomy w/ ileocolic anastomosis, extensive enterolysis, improved  Pulm following - appreciate recs    duoneb q4h   ID consulted - appreciate recs    Ab discontinued 5/24, cont. To monitor    Blood cx NGTD   Surgery consulted - appreciate recs    Take back to OR for washout, retention sutures needed vs wound vac      Anemia 2/2 ABLA vs. AOCD, stable  Hgb 9.2      Paroxysmal A.fib - resolved   Cardiology consulted - appreciate recs   Reduced amio to 100mg    Signed off       Hypothyroid  Levothyroxine 50mcg     DVT PPx  Lovenox    Dispo  PT/OT  SW following    Osex-pr-kgly by Dr. Rhett block Tupasha Ulrich MD  5/25/2019  8:48 AM     Attestation and add on       I have discussed the care of Sukhjinder Reilly , including pertinent history and exam findings,    today with the resident. I have seen and examined the patient and the key elements of all parts of the encounter have been performed by me . I agree with the assessment, plan and orders as documented by the resident.      Principal Problem:    Acute respiratory failure (HCC)  Active Problems:    Sepsis due to urinary tract infection (HCC)    Cystitis    Emphysematous cystitis    Septic shock ([address]    Leukemoid reaction    Aspiration pneumonia of right lower lobe due to vomit [facility])    MRSA carrier    Urinary retention    Abdominal distention    Elevated CEA    Elevated CA 19-9 level    Cholecystitis    CRP elevated    Elevated procalcitonin    Bandemia    Centrilobular emphysema (HCC)    Hemorrhage of rectum and anus    GI bleed    Anemia    Small bowel obstruction (HCC)    Anxiety disorder    Noncompliance    Chronic obstructive pulmonary disease, unspecified ([facility]    Bacteremia due to coagulase-negative Staphylococcus  Resolved Problems:    * No resolved hospital problems. *            --Patient delirium is improving she is following simple commands and connecting somewhat  Yesterday she had surgery to address wound dehiscence  Postop stable  Noted to have bradycardia she is on amiodarone and beta blocker we will discontinue beta blocker continue amiodarone check EKG in a.m. Known to have hypothyroidism we will recheck TSH and free T4 tomorrow-- ;     [address] [place] [address].    Phone [phone]   Fax: [phone]  Answering Service: [phone]

## 2019-05-25 NOTE — PROGRESS NOTES
[facility] -   Infectious diseases evaluation  admission date 4/11/2019    reason for consultation:   Sepsis    Impression :   Current:  · Acute cholecystitis status post cholecystostomy tube 4/22, follow with candida non-albicans and 1:8 capsule ESBL  · E. coli.  Emphysematous cystitis - resolved  · Sputum colonized with yeast  · 5/10/19.  Bilateral lower lobe aspiration pneumonia - treated  · 5/10/19.  Acute leukocytosis 28 - better  · 5/10/19, respiratory distress on BiPAP  · Elevated CRP  · Leukocytosis with bandemia  · CSN bacteremia - contamination    Other:  ·   Discussion / summary of stay / plan of care   · Cholecystitis status post cholecystostomy tube placement on 4/22 were growing candida non-albicans with one colony of ESBL Klebsiella.  Finished a course of antibiotics  · E. coli cystitis considered to be emphysematous.  According to the imaging.  Treated  · Aspiration pneumonia right lower lobe.  Treated  · Urinary retention  · 5/10/19.  Acute episode of sepsis, septic shock on pressors, leukocytosis up to 28th, picture of small bowel obstruction with abdominal pain and distention.  Transferred to ICU on BiPAP, questionable aspiration  · Was on ceftriaxone and Flagyl, switched on 5/10 to meropenem and vancomycin. Meropenem stopped 5/23/19. Vancomycin stopped 5/18/19. · Watch off antibiotics for now. Pending LTACH placement. Recommendations   · Watch off antibiotics for now. · Supportive care. · Discussed with patient, RN.     Infection Control Recommendations   · [place] Precautions  · Contact Isolation     Antimicrobial Stewardship Recommendations   · Simplification of therapy  ·   Coordination ofOutpatient Care:   · Estimated Length of IV antimicrobials:  · Patient will need Midline / picc Catheter Insertion:   · Patient will need SNF:  · Patient will need outpatient wound care:     History of Present Illness:   Initial history:  Angelika Jay performed by Doroteo Gomez DO at [facility]  4/14/2019         LAPAROSCOPY N/A 5/15/2019    LAPAROSCOPY EXPLORATORY CONVERTED TO EXPLORATORY LAPAROTOMY/ RIGHT COLON RESECTION AND ANASTAMOSIS/ OPEN CHOLECYSTECTOMY/ EXTENSIVE LYSIS OF ADHESIONS performed by Doroteo Gomez DO at [facility] 10  07/2011    Pacemaker is Medtronic Revo (compatible). Leads placed in 1995 are NOT MRI compatible. Placed at [facility]. V's per Dr. Kory Owusu can not have an MRI.  UPPER GASTROINTESTINAL ENDOSCOPY N/A 4/16/2019    EGD ESOPHAGOGASTRODUODENOSCOPY @ BEDSIDE  ICU 2002 performed by Kiki Gordon MD at [facility] OR       Medications:      amiodarone  100 mg Oral BID    levothyroxine  50 mcg Oral Daily    mirtazapine  7.5 mg Oral Nightly    ipratropium-albuterol  1 ampule Inhalation 4x daily    enoxaparin  30 mg Subcutaneous Daily    metoprolol  2.5 mg Intravenous Q8H    fat emulsion  100 mL Intravenous Daily    insulin lispro  0-6 Units Subcutaneous Q6H    sodium chloride flush  10 mL Intravenous 2 times per day    pantoprazole  40 mg Intravenous Daily    And    sodium chloride (PF)  10 mL Intravenous Daily    alteplase  1 mg Intracatheter Once    mometasone-formoterol  2 puff Inhalation BID       Social History:     Social History     Socioeconomic History    Marital status:       Spouse name: Not on file    Number of children: Not on file    Years of education: Not on file    Highest education level: Not on file   Occupational History    Not on file   Social Needs    Financial resource strain: Not on file    Food insecurity:     Worry: Not on file     Inability: Not on file    Transportation needs:     Medical: Not on file     Non-medical: Not on file   Tobacco Use    Smoking status: Current Some Day Smoker     Packs/day: 1.00     Years: 30.00     Pack years: 30.00    Smokeless tobacco: Never Used   Substance and Sexual Activity    Alcohol use: Not Currently Alcohol/week: 16.8 oz     Types: 28 Glasses of wine per week    Drug use: No    Sexual activity: Not on file   Lifestyle    Physical activity:     Days per week: Not on file     Minutes per session: Not on file    Stress: Not on file   Relationships    Social connections:     Talks on phone: Not on file     Gets together: Not on file     Attends Orthodoxy service: Not on file     Active member of club or organization: Not on file     Attends meetings of clubs or organizations: Not on file     Relationship status: Not on file    Intimate partner violence:     Fear of current or ex partner: Not on file     Emotionally abused: Not on file     Physically abused: Not on file     Forced sexual activity: Not on file   Other Topics Concern    Not on file   Social History Narrative    Not on file       Family History:   History reviewed. No pertinent family history. Allergies:   Penicillins and Amoxicillin     Review of Systems:     Review of Systems   Constitutional: Negative for chills and fever. HENT: Negative for rhinorrhea and sore throat. Eyes: Negative for pain and discharge. Respiratory: Negative for cough and shortness of breath. Cardiovascular: Negative for chest pain and palpitations. Gastrointestinal: Positive for abdominal pain. Negative for nausea and vomiting. Endocrine: Negative. Genitourinary: Tran   Musculoskeletal:        Generalized pain. Skin: Positive for wound. Negative for rash. Midline abd incision. MORGAN x 1. Allergic/Immunologic: Negative. Neurological: Negative for dizziness and headaches. Hematological: Negative. Psychiatric/Behavioral: Positive for agitation. Negative for confusion. The patient is nervous/anxious.         Physical Examination :     Patient Vitals for the past 8 hrs:   BP Temp Temp src Pulse Resp SpO2   05/25/19 1323 (!) 120/59 98.8 °F (37.1 °C) Oral 92 16 98 %   05/25/19 0834 (!) 146/70 99 °F (37.2 °C) Oral 91 16 97 % 05/25/19 0654 -- -- -- -- 17 96 %       Physical Exam   Constitutional: She is oriented to person, place, and time. She appears well-developed and well-nourished. No distress. HENT:   Head: Normocephalic and atraumatic. Mouth/Throat: Oropharynx is clear and moist.   Eyes: Pupils are equal, round, and reactive to light. Conjunctivae and EOM are normal.   Neck: Normal range of motion. Neck supple. No JVD present. No tracheal deviation present. Cardiovascular: Normal rate, regular rhythm and normal heart sounds. No murmur heard. Pulmonary/Chest: Effort normal. No respiratory distress. Abdominal: Soft. Bowel sounds are normal. There is tenderness. Midline abdominal incision. 2 drains - peg in place   Genitourinary:   Genitourinary Comments: Tran-urine clear. Musculoskeletal: Normal range of motion. She exhibits no edema or deformity. Neurological: She is alert and oriented to person, place, and time. No cranial nerve deficit. Skin: Skin is warm and dry. Capillary refill takes less than 2 seconds. No rash noted. Psychiatric: She has a normal mood and affect. Her behavior is normal. Judgment and thought content normal.   Nursing note and vitals reviewed. Medical Decision Making:   I have independently reviewed/ordered the following labs:    CBC with Differential:   Recent Labs     05/24/19  0511 05/25/19  0509   WBC 14.7* 13.0*   HGB 10.6* 9.2*   HCT 32.2* 28.4*    217   LYMPHOPCT 11* 5*   MONOPCT 3 6     BMP:  Recent Labs     05/24/19  0511 05/25/19  0509   * 134*   K 4.8 4.2    107   CO2 22 21   BUN 38* 37*   CREATININE <0.40* <0.40*   MG 2.0  --      Hepatic Function Panel:   Recent Labs     05/25/19  0509   PROT 4.8*   LABALBU 2.4*   BILITOT 0.42   ALKPHOS 86   ALT 18   AST 21     No results for input(s): RPR in the last 72 hours. No results for input(s): HIV in the last 72 hours. No results for input(s): BC in the last 72 hours.   Lab Results   Component Value Date CREATININE <0.40 05/25/2019    GLUCOSE 96 05/25/2019    GLUCOSE 87 05/21/2012       Detailed results: Thank you for allowing us to participate in the care of this patient. Please call with questions. This note is created with the assistance of a speech recognition program.  While intending to generate adocument that actually reflects the content of the visit, the document can still have some errors including those of syntax and sound a like substitutions which may escape proof reading. It such instances, actual meaningcan be extrapolated by contextual diversion.     Lasha Goodrich, AGAPITO - CNP  Office: [phone]  Perfect serve / office [phone]

## 2019-05-26 LAB
ABSOLUTE BANDS #: 0.54 K/UL (ref 0–1)
ABSOLUTE EOS #: 0 K/UL (ref 0–0.4)
ABSOLUTE IMMATURE GRANULOCYTE: ABNORMAL K/UL (ref 0–0.3)
ABSOLUTE LYMPH #: 0.72 K/UL (ref 1–4.8)
ABSOLUTE MONO #: 0.72 K/UL (ref 0.1–1.3)
ANION GAP SERPL CALCULATED.3IONS-SCNC: 10 MMOL/L (ref 9–17)
BANDS: 3 % (ref 0–10)
BASOPHILS # BLD: 0 % (ref 0–2)
BASOPHILS ABSOLUTE: 0 K/UL (ref 0–0.2)
BILIRUBIN FLUID: 1.2 MG/DL
BUN BLDV-MCNC: 36 MG/DL (ref 8–23)
BUN/CREAT BLD: ABNORMAL (ref 9–20)
CALCIUM SERPL-MCNC: 7.3 MG/DL (ref 8.6–10.4)
CHLORIDE BLD-SCNC: 108 MMOL/L (ref 98–107)
CO2: 18 MMOL/L (ref 20–31)
CREAT SERPL-MCNC: <0.4 MG/DL (ref 0.5–0.9)
DIFFERENTIAL TYPE: ABNORMAL
EOSINOPHILS RELATIVE PERCENT: 0 % (ref 0–4)
FERRITIN: 869 UG/L (ref 13–150)
GFR AFRICAN AMERICAN: ABNORMAL ML/MIN
GFR NON-AFRICAN AMERICAN: ABNORMAL ML/MIN
GFR SERPL CREATININE-BSD FRML MDRD: ABNORMAL ML/MIN/{1.73_M2}
GFR SERPL CREATININE-BSD FRML MDRD: ABNORMAL ML/MIN/{1.73_M2}
GLUCOSE BLD-MCNC: 103 MG/DL (ref 65–105)
GLUCOSE BLD-MCNC: 107 MG/DL (ref 65–105)
GLUCOSE BLD-MCNC: 129 MG/DL (ref 65–105)
GLUCOSE BLD-MCNC: 129 MG/DL (ref 65–105)
GLUCOSE BLD-MCNC: 142 MG/DL (ref 70–99)
GLUCOSE BLD-MCNC: 143 MG/DL (ref 65–105)
GLUCOSE BLD-MCNC: 170 MG/DL (ref 65–105)
HCT VFR BLD CALC: 22.9 % (ref 36–46)
HEMOGLOBIN: 7.3 G/DL (ref 12–16)
IMMATURE GRANULOCYTES: ABNORMAL %
IRON SATURATION: 13 % (ref 20–55)
IRON: 12 UG/DL (ref 37–145)
LYMPHOCYTES # BLD: 4 % (ref 24–44)
MCH RBC QN AUTO: 27.8 PG (ref 26–34)
MCHC RBC AUTO-ENTMCNC: 31.7 G/DL (ref 31–37)
MCV RBC AUTO: 87.7 FL (ref 80–100)
MONOCYTES # BLD: 4 % (ref 1–7)
MORPHOLOGY: ABNORMAL
MORPHOLOGY: ABNORMAL
NRBC AUTOMATED: ABNORMAL PER 100 WBC
PDW BLD-RTO: 16.1 % (ref 11.5–14.9)
PLATELET # BLD: 226 K/UL (ref 150–450)
PLATELET ESTIMATE: ABNORMAL
PMV BLD AUTO: 8.8 FL (ref 6–12)
POTASSIUM SERPL-SCNC: 3.9 MMOL/L (ref 3.7–5.3)
RBC # BLD: 2.61 M/UL (ref 4–5.2)
RBC # BLD: ABNORMAL 10*6/UL
SEG NEUTROPHILS: 89 % (ref 36–66)
SEGMENTED NEUTROPHILS ABSOLUTE COUNT: 16.02 K/UL (ref 1.3–9.1)
SODIUM BLD-SCNC: 136 MMOL/L (ref 135–144)
SOURCE: NORMAL
TOTAL IRON BINDING CAPACITY: 95 UG/DL (ref 250–450)
TRANSFERRIN: 86 MG/DL (ref 200–360)
UNSATURATED IRON BINDING CAPACITY: 83 UG/DL (ref 112–347)
WBC # BLD: 18 K/UL (ref 3.5–11)
WBC # BLD: ABNORMAL 10*3/UL

## 2019-05-26 PROCEDURE — 84466 ASSAY OF TRANSFERRIN: CPT

## 2019-05-26 PROCEDURE — 85025 COMPLETE CBC W/AUTO DIFF WBC: CPT

## 2019-05-26 PROCEDURE — 36415 COLL VENOUS BLD VENIPUNCTURE: CPT

## 2019-05-26 PROCEDURE — 6370000000 HC RX 637 (ALT 250 FOR IP): Performed by: SURGERY

## 2019-05-26 PROCEDURE — 6360000002 HC RX W HCPCS: Performed by: SURGERY

## 2019-05-26 PROCEDURE — 2060000000 HC ICU INTERMEDIATE R&B

## 2019-05-26 PROCEDURE — 2500000003 HC RX 250 WO HCPCS: Performed by: SURGERY

## 2019-05-26 PROCEDURE — 94640 AIRWAY INHALATION TREATMENT: CPT

## 2019-05-26 PROCEDURE — 83550 IRON BINDING TEST: CPT

## 2019-05-26 PROCEDURE — 82947 ASSAY GLUCOSE BLOOD QUANT: CPT

## 2019-05-26 PROCEDURE — 80048 BASIC METABOLIC PNL TOTAL CA: CPT

## 2019-05-26 PROCEDURE — C9113 INJ PANTOPRAZOLE SODIUM, VIA: HCPCS | Performed by: SURGERY

## 2019-05-26 PROCEDURE — 99233 SBSQ HOSP IP/OBS HIGH 50: CPT | Performed by: INTERNAL MEDICINE

## 2019-05-26 PROCEDURE — 2580000003 HC RX 258: Performed by: SURGERY

## 2019-05-26 PROCEDURE — 82728 ASSAY OF FERRITIN: CPT

## 2019-05-26 PROCEDURE — 83540 ASSAY OF IRON: CPT

## 2019-05-26 RX ORDER — 0.9 % SODIUM CHLORIDE 0.9 %
500 INTRAVENOUS SOLUTION INTRAVENOUS ONCE
Status: COMPLETED | OUTPATIENT
Start: 2019-05-26 | End: 2019-05-26

## 2019-05-26 RX ADMIN — AMIODARONE HYDROCHLORIDE 100 MG: 100 TABLET ORAL at 07:44

## 2019-05-26 RX ADMIN — LORAZEPAM 2 MG: 2 INJECTION INTRAMUSCULAR; INTRAVENOUS at 05:59

## 2019-05-26 RX ADMIN — FENTANYL CITRATE 50 MCG: 50 INJECTION INTRAMUSCULAR; INTRAVENOUS at 07:40

## 2019-05-26 RX ADMIN — FENTANYL CITRATE 50 MCG: 50 INJECTION INTRAMUSCULAR; INTRAVENOUS at 17:13

## 2019-05-26 RX ADMIN — AMIODARONE HYDROCHLORIDE 100 MG: 100 TABLET ORAL at 20:45

## 2019-05-26 RX ADMIN — INSULIN LISPRO 1 UNITS: 100 INJECTION, SOLUTION INTRAVENOUS; SUBCUTANEOUS at 00:10

## 2019-05-26 RX ADMIN — METRONIDAZOLE 500 MG: 500 INJECTION, SOLUTION INTRAVENOUS at 05:59

## 2019-05-26 RX ADMIN — FENTANYL CITRATE 50 MCG: 50 INJECTION INTRAMUSCULAR; INTRAVENOUS at 20:45

## 2019-05-26 RX ADMIN — SODIUM CHLORIDE 500 ML: 9 INJECTION, SOLUTION INTRAVENOUS at 11:39

## 2019-05-26 RX ADMIN — Medication 10 ML: at 07:44

## 2019-05-26 RX ADMIN — Medication 2 PUFF: at 07:44

## 2019-05-26 RX ADMIN — I.V. FAT EMULSION 100 ML: 20 EMULSION INTRAVENOUS at 18:12

## 2019-05-26 RX ADMIN — SODIUM CHLORIDE, POTASSIUM CHLORIDE, SODIUM LACTATE AND CALCIUM CHLORIDE: 600; 310; 30; 20 INJECTION, SOLUTION INTRAVENOUS at 00:38

## 2019-05-26 RX ADMIN — INSULIN LISPRO 1 UNITS: 100 INJECTION, SOLUTION INTRAVENOUS; SUBCUTANEOUS at 13:06

## 2019-05-26 RX ADMIN — FENTANYL CITRATE 50 MCG: 50 INJECTION INTRAMUSCULAR; INTRAVENOUS at 15:54

## 2019-05-26 RX ADMIN — LORAZEPAM 2 MG: 2 INJECTION INTRAMUSCULAR; INTRAVENOUS at 20:45

## 2019-05-26 RX ADMIN — FENTANYL CITRATE 50 MCG: 50 INJECTION INTRAMUSCULAR; INTRAVENOUS at 04:10

## 2019-05-26 RX ADMIN — IPRATROPIUM BROMIDE AND ALBUTEROL SULFATE 1 AMPULE: .5; 3 SOLUTION RESPIRATORY (INHALATION) at 08:16

## 2019-05-26 RX ADMIN — METRONIDAZOLE 500 MG: 500 INJECTION, SOLUTION INTRAVENOUS at 13:05

## 2019-05-26 RX ADMIN — METOPROLOL TARTRATE 2.5 MG: 1 INJECTION, SOLUTION INTRAVENOUS at 04:10

## 2019-05-26 RX ADMIN — FENTANYL CITRATE 50 MCG: 50 INJECTION INTRAMUSCULAR; INTRAVENOUS at 11:05

## 2019-05-26 RX ADMIN — FENTANYL CITRATE 50 MCG: 50 INJECTION INTRAMUSCULAR; INTRAVENOUS at 01:33

## 2019-05-26 RX ADMIN — PANTOPRAZOLE SODIUM 40 MG: 40 INJECTION, POWDER, FOR SOLUTION INTRAVENOUS at 07:44

## 2019-05-26 RX ADMIN — MIRTAZAPINE 7.5 MG: 15 TABLET, ORALLY DISINTEGRATING ORAL at 20:45

## 2019-05-26 RX ADMIN — METRONIDAZOLE 500 MG: 500 INJECTION, SOLUTION INTRAVENOUS at 23:00

## 2019-05-26 RX ADMIN — CIPROFLOXACIN 400 MG: 2 INJECTION, SOLUTION INTRAVENOUS at 11:05

## 2019-05-26 RX ADMIN — LEVOTHYROXINE SODIUM 50 MCG: 50 TABLET ORAL at 05:59

## 2019-05-26 RX ADMIN — CALCIUM GLUCONATE: 94 INJECTION, SOLUTION INTRAVENOUS at 18:08

## 2019-05-26 RX ADMIN — Medication 2 PUFF: at 20:46

## 2019-05-26 RX ADMIN — FENTANYL CITRATE 50 MCG: 50 INJECTION INTRAMUSCULAR; INTRAVENOUS at 19:03

## 2019-05-26 ASSESSMENT — PAIN SCALES - GENERAL
PAINLEVEL_OUTOF10: 10
PAINLEVEL_OUTOF10: 9
PAINLEVEL_OUTOF10: 10
PAINLEVEL_OUTOF10: 10
PAINLEVEL_OUTOF10: 5
PAINLEVEL_OUTOF10: 10
PAINLEVEL_OUTOF10: 5

## 2019-05-26 ASSESSMENT — ENCOUNTER SYMPTOMS
VOMITING: 0
NAUSEA: 0
COUGH: 0
SHORTNESS OF BREATH: 0
CONSTIPATION: 0
ABDOMINAL PAIN: 1
DIARRHEA: 0

## 2019-05-26 NOTE — PLAN OF CARE
Problem: Risk for Impaired Skin Integrity  Goal: Tissue integrity - skin and mucous membranes  Description  Structural intactness and normal physiological function of skin and  mucous membranes.   5/26/2019 1727 by Giovanni Ospina RN  Outcome: Ongoing     Problem: Falls - Risk of:  Goal: Will remain free from falls  Description  Will remain free from falls  5/26/2019 1727 by Giovanni Ospina RN  Outcome: Ongoing     Problem: Tissue Perfusion, Altered:  Goal: Circulatory function within specified parameters  Description  Circulatory function within specified parameters  Outcome: Ongoing     Problem: Pain:  Goal: Pain level will decrease  Description  Pain level will decrease  5/26/2019 1727 by Giovanni Ospina RN  Outcome: Ongoing     Problem: Nutrition  Goal: Optimal nutrition therapy  Description       5/26/2019 1727 by Giovanni Ospina RN  Outcome: Ongoing

## 2019-05-26 NOTE — PROGRESS NOTES
RN rounded with Dr. Concepción Connolly, patient tube feed can be stated at 10 ML/hr do not advance, and patient can be restarted on dental soft diet.

## 2019-05-26 NOTE — OP NOTE
Operative Note      PATIENT NAME:  [facility]               MEDICAL RECORD NO. [id number]                DATE OF PROCEDURE:  5/25/2019  PRIMARY CARE PHYSICIAN:  Dulce Maria Bowden DO  PREOPERATIVE DIAGNOSIS:  Midline wound dehiscence. Moderate protein calorie malnutrition   POSTOPERATIVE DIAGNOSIS:  Same  PROCEDURE PERFORMED:  Reopening of recent laparotomy. Abdominal washout. Gastrostomy tube placement. Repair of fascial dehiscence using Vicryl mesh  SURGEON:  Howie Estrada DO, TAISHA  ANESTHESIA:  Gen. BLOOD LOSS:  Less than 50 ml. SPECIMENS:  None. COMPLICATIONS:  None immediately appreciated. INDICATION FOR PROCEDURE:  Gisell Meyer is a 77y.o. year old female who had a right colectomy and cholecystectomy about 10 days ago. She developed fascial dehiscence with evisceration of abdominal contents. Surgical repair was recommended. Operative management was discussed. Risks, complications, benefits were reviewed. She was given the opportunity to ask questions. Once answered an informed consent was obtained. The patient was brought to the operating room on 5/25/2019. PROCEDURE:   The patient received preoperative antibiotic, DVT and GI prophylaxis. She was taken to the operating room, placed on the operative table in the supine position. General anesthetic was administered and the patient was intubated. The operative site was prepped and draped in the usual sterile fashion. Time out was called. The remaining staples were removed and the fascial opening was completed. The abdomen was explored. There was no contamination. The abdominal cavity was irrigated with 2 L of warm saline. The previous ileocolic anastomosis was examined and it was noted to be intact. Decision was made first to place a gastrostomy tube. The stomach was grasped and the anterior wall was grasped with [place]. A small gastrotomy was made with electrocautery.   A 20 Fr feeding tube was introduced into the abdominal cavity through

## 2019-05-26 NOTE — PROGRESS NOTES
General Surgery Progress Note            PATIENT NAME: Winston Martinez     TODAY'S DATE: 5/26/2019, 4:02 PM    SUBJECTIVE:    Pt  seen and examined. Complaining of pain. OBJECTIVE:   VITALS:  BP (!) 115/54   Pulse 90   Temp 98.8 °F (37.1 °C) (Axillary)   Resp 16   Ht 5' (1.524 m)   Wt 102 lb 4.7 oz (46.4 kg)   SpO2 100%   BMI 19.98 kg/m²      INTAKE/OUTPUT:      Intake/Output Summary (Last 24 hours) at 5/26/2019 1602  Last data filed at 5/26/2019 1301  Gross per 24 hour   Intake 200 ml   Output 2015 ml   Net -1815 ml                 CONSTITUTIONAL:  awake and alert. No acute distress  HEART:   Regular  LUNGS:   Clear but diminished at the bases  ABDOMEN:   Abdomen soft, moderately tender, non-distended. Positive bowel sounds. Incision is intact with some serosanguineous drainage.   JPs are serosanguineous  EXTREMITIES:   1+ edema    Data:  CBC with Differential:    Lab Results   Component Value Date    WBC 18.0 05/26/2019    RBC 2.61 05/26/2019    RBC 3.66 05/23/2012    HGB 7.3 05/26/2019    HCT 22.9 05/26/2019     05/26/2019     05/23/2012    MCV 87.7 05/26/2019    MCH 27.8 05/26/2019    MCHC 31.7 05/26/2019    RDW 16.1 05/26/2019    METASPCT 1 05/25/2019    LYMPHOPCT 4 05/26/2019    MONOPCT 4 05/26/2019    MYELOPCT 2 05/25/2019    BASOPCT 0 05/26/2019    MONOSABS 0.72 05/26/2019    LYMPHSABS 0.72 05/26/2019    EOSABS 0.00 05/26/2019    BASOSABS 0.00 05/26/2019    DIFFTYPE NOT REPORTED 05/26/2019     CMP:    Lab Results   Component Value Date     05/26/2019    K 3.9 05/26/2019     05/26/2019    CO2 18 05/26/2019    BUN 36 05/26/2019    CREATININE <0.40 05/26/2019    GFRAA CANNOT BE CALCULATED 05/26/2019    LABGLOM CANNOT BE CALCULATED 05/26/2019    GLUCOSE 142 05/26/2019    GLUCOSE 87 05/21/2012    PROT 4.8 05/25/2019    LABALBU 2.4 05/25/2019    LABALBU 4.6 05/17/2012    CALCIUM 7.3 05/26/2019    BILITOT 0.42 05/25/2019    ALKPHOS 86 05/25/2019    AST 21 05/25/2019 ALT 18 05/25/2019         ASSESSMENT     Principal Problem:    Acute respiratory failure (HCC)  Active Problems:    Sepsis due to urinary tract infection (HCC)    Cystitis    Emphysematous cystitis    Septic shock (HCC)    Leukemoid reaction    Aspiration pneumonia of right lower lobe due to vomit (HCC)    MRSA carrier    Urinary retention    Abdominal distention    Elevated CEA    Elevated CA 19-9 level    Cholecystitis    CRP elevated    Elevated procalcitonin    Bandemia    Centrilobular emphysema (HCC)    Hemorrhage of rectum and anus    GI bleed    Anemia    Small bowel obstruction (HCC)    Anxiety disorder    Noncompliance    Chronic obstructive pulmonary disease, unspecified ([facility]    Bacteremia due to coagulase-negative Staphylococcus    Bradycardia  Resolved Problems:    * No resolved hospital problems. *  Status post right colectomy, cholecystectomy  Status post repair of dehiscence with gastrostomy tube placement    Plan  1. Start tube feeding at lower rate  2. Up as tolerated  3.  Continue antibiotics        Buddy Ruvalcaba, [id number]

## 2019-05-26 NOTE — PROGRESS NOTES
Date    MODE PRVC 05/20/2019     Ionized Calcium:  No results found for: IONCA  Magnesium:    Lab Results   Component Value Date    MG 2.0 05/24/2019     Phosphorus:    Lab Results   Component Value Date    PHOS 3.3 05/24/2019        LIVER PROFILE   Recent Labs     05/25/19  0509   AST 21   ALT 18   BILITOT 0.42   ALKPHOS 86     INR No results for input(s): INR in the last 72 hours. PTT   Lab Results   Component Value Date    APTT 31.9 05/22/2019         RADIOLOGY     (See actual reports for details)    ASSESSMENT/PLAN   Principal Problem:    Acute respiratory failure ([address]  Active Problems:    Sepsis due to urinary tract infection (HCC)    Cystitis    Emphysematous cystitis    Septic shock (HCC)    Leukemoid reaction    Aspiration pneumonia of right lower lobe due to vomit (HCC)    MRSA carrier    Urinary retention    Abdominal distention    Elevated CEA    Elevated CA 19-9 level    Cholecystitis    CRP elevated    Elevated procalcitonin    Bandemia    Centrilobular emphysema (HCC)    Hemorrhage of rectum and anus    GI bleed    Anemia    Small bowel obstruction (HCC)    Anxiety disorder    Noncompliance    Chronic obstructive pulmonary disease, unspecified ([facility]    Bacteremia due to coagulase-negative Staphylococcus  Resolved Problems:    * No resolved hospital problems.  *  WBCs 18,000, hemoglobin 7.3  Status post reexploration-5/25      Plan  Continue TPN  Nothing by mouth per surgery  Continue aerosol treatments  O2 as needed  ECF when stable      Electronically signed by Kristine Franz MD on 5/26/2019 at 12:21 PM

## 2019-05-26 NOTE — BRIEF OP NOTE
Brief Postoperative Note  ______________________________________________________________    Patient: Chloe Alexander  YOB: 1950  MRN: [id number]  Date of Procedure: 5/25/2019    Pre-Op Diagnosis: Wound dehiscence    Post-Op Diagnosis: Same       Procedure(s):  REOPENING OF RECENT LAPOROATOMY, ABDOMINAL WASHOUT, REPAIR OF FASCIAL DEHISENCE WITH MESH AND RETENTION SUTURES,  GASTROSTOMY TUBE PLACEMENT    Anesthesia: General    Surgeon(s):  Fouzia Luo DO    Estimated Blood Loss (mL): less than 50     Complications: None    Drains:   Closed/Suction Drain Right;Medial Abdomen Other (Comment) 8 [facility] (Active)   Site Description Healing 4/22/2019  4:00 PM   Dressing Status Clean;Dry; Intact 4/22/2019  4:00 PM   Drainage Appearance Brown 4/22/2019  4:00 PM   Status Open to gravity drainage 4/22/2019  4:00 PM       Closed/Suction Drain Lateral RLQ Bulb 19 [place] (Active)   Site Description Unable to view 5/24/2019  8:30 AM   Dressing Status Clean;Dry; Intact 5/25/2019  4:34 PM   Drainage Appearance Serosanguinous 5/25/2019  4:34 PM   Status To bulb suction 5/24/2019  7:51 PM   Output (ml) 55 ml 5/25/2019  4:34 PM       Closed/Suction Drain Right RLQ Bulb 15 [place] (Active)       Gastrostomy/Enterostomy/Jejunostomy Gastrostomy LLQ 1 20 fr (Active)       Urethral Catheter Non-latex 16 fr (Active)   Catheter Indications Need for fluid management in critically ill patients in a critical care setting not able to be managed by other means such as BSC with hat, bedpan, urinal, condom catheter, or short term intermittent urethral catherization 5/25/2019  4:34 PM   Site Assessment No urethral drainage 5/25/2019  4:34 PM   Urine Color Yellow 5/25/2019  4:34 PM   Urine Appearance Clear 5/25/2019  4:34 PM   Output (mL) 675 mL 5/25/2019  5:21 PM       [REMOVED] Biliary Tube Other (Comment) 8 fr RLQ (Removed)   Site Description Other (Comment) 5/13/2019 10:32 AM   Dressing Status Clean;Dry; Intact 5/14/2019  8:36 AM   Dressing 4:00 AM   Tube Feeding Status Stopped 5/20/2019  4:00 AM   Rate/Schedule 10 mL/hr 5/19/2019  4:00 PM   Tube Feeding Intake (mL) 55 ml 5/19/2019  6:03 PM   Residual Volume (ml) 150 ml 5/20/2019  4:00 PM   Output (mL) 10 ml 5/17/2019  6:05 PM       [REMOVED] Rectal Tube (Removed)   Stool Appearance Loose 5/13/2019  5:23 PM   Stool Color Other (Comment) 5/13/2019  5:23 PM   Stool Amount Small 5/13/2019  5:23 PM   Rectal Tube Output 25 ml 5/13/2019  5:23 PM       [REMOVED] Rectal Tube Without balloon (Removed)   Stool Appearance Loose 5/22/2019  8:30 AM   Stool Color Black;Green 5/22/2019  8:30 AM   Stool Amount Small 5/21/2019  4:00 AM   Rectal Tube Output 100 ml 5/22/2019  5:20 AM       [REMOVED] Urethral Catheter Coude 16 fr (Removed)   Catheter Indications Urology/Urologist seeing this patient or inserted indwelling catheter;Acute urinary retention/obstruction; Need for fluid management in critically ill patients in a critical care setting not able to be managed by other means such as BSC with hat, bedpan, urinal, condom catheter, or short term intermittent urethral catherization 4/19/2019  4:15 AM   Securement Device Date Changed 04/17/19 4/17/2019 12:00 AM   Site Assessment No urethral drainage;Pink 4/19/2019  4:15 AM   Urine Color Izabella;Yellow 4/19/2019  4:15 AM   Urine Appearance Clear 4/19/2019  4:15 AM   Urine Odor Malodorous 4/17/2019  6:20 AM   Output (mL) 2803 mL 4/19/2019  6:33 AM       [REMOVED] Urethral Catheter Non-latex 16 fr (Removed)   $ Urethral catheter insertion $ Not inserted for procedure 4/19/2019  4:15 AM   Catheter Indications Urology/Urologist seeing this patient or inserted indwelling catheter 4/29/2019  2:50 PM   Securement Device Date Changed 04/21/19 4/21/2019  7:10 AM   Site Assessment No urethral drainage 4/29/2019  2:50 PM   Urine Color Yellow 4/29/2019  2:50 PM   Urine Appearance Clear 4/29/2019  2:50 PM   Output (mL) 1100 mL 4/29/2019  5:34 PM       [REMOVED] External Urinary Catheter (Removed)   Urine Color Izabella 5/3/2019  4:00 AM   Urine Appearance Clear 5/3/2019  4:00 AM   Output (mL) 650 mL 5/3/2019  5:54 AM   Suction- Female Only 40 mmgHg continuous 5/3/2019  4:00 AM   Placement Replaced 5/2/2019  8:00 PM   Skin Assessment No Injury 5/3/2019  4:00 AM       [REMOVED] External Urinary Catheter (Removed)   Urine Color Izabella 5/8/2019  1:41 PM   Urine Appearance Cloudy 5/8/2019  1:41 PM   Output (mL) 450 mL 5/8/2019  1:41 PM   Suction- Female Only 40 mmgHg continuous 5/8/2019 11:08 AM   Placement Replaced 5/8/2019  1:41 PM   Skin Assessment No Injury 5/8/2019  8:24 AM       [REMOVED] External Urinary Catheter (Removed)   Urine Color Yellow 5/11/2019  4:00 AM   Urine Appearance Cloudy 5/11/2019  4:00 AM   Output (mL) 350 mL 5/11/2019  4:14 AM   Suction- Female Only 40 mmgHg continuous 5/11/2019  4:00 AM   Placement Initiated 5/10/2019  9:00 AM   Skin Assessment No Injury 5/11/2019  4:00 AM       Findings: fascial dehiscence      Jonas Valverde DO  Date: 5/25/2019  Time: 8:13 PM

## 2019-05-26 NOTE — PROGRESS NOTES
[address] [facility].    PROGRESS NOTE             5/26/2019    1:53 PM    Name:   Lisandra Barboza  MRN:     [id number]     Acct:      [de-identified]   Room:   2096/2096-01   Day:  39  Admit Date:  4/11/2019  2:48 PM    PCP:  Bladimir Ward DO  Code Status:  Full Code    Subjective:     C/C:   Chief Complaint   Patient presents with    Abdominal Pain     Interval History Status: not changed. Patient seen bedside on progressive. No acute events overnight. VSS. Per surgery, possible wound vac. Pt is NPO. Waiting [id number] by Dr. Daquan Springer. Possible LTACH at [facility]. Continue to monitor. Brief History:     S/p open ashley, ex lap, R colectomy w/ ileocolic anastomosis, excision small bowel diverticulum, extensive enterolysis. Intubated on vent, low pressor requirement, worsening edema, on solumedrol and albumin support. Extubated on 5/20. Edema improving, feeding transitioning to NGT.        Review of Systems:     Review of Systems   Constitutional: Negative for chills, fatigue and fever. Respiratory: Negative for cough and shortness of breath. Cardiovascular: Negative for chest pain. Gastrointestinal: Positive for abdominal pain. Negative for constipation, diarrhea, nausea and vomiting. Musculoskeletal: Negative for myalgias. Neurological: Negative for headaches. Medications: Allergies:     Allergies   Allergen Reactions    Penicillins Shortness Of Breath and Rash    Amoxicillin        Current Meds:   Scheduled Meds:    metroNIDAZOLE  500 mg Intravenous Q8H    ciprofloxacin  400 mg Intravenous Q12H    amiodarone  100 mg Oral BID    levothyroxine  50 mcg Oral Daily    mirtazapine  7.5 mg Oral Nightly    ipratropium-albuterol  1 ampule Inhalation 4x daily    fat emulsion  100 mL Intravenous Daily    insulin lispro  0-6 Units Subcutaneous Q6H    sodium chloride flush  10 mL Intravenous 2 times per day  pantoprazole  40 mg Intravenous Daily    And    sodium chloride (PF)  10 mL Intravenous Daily    alteplase  1 mg Intracatheter Once    mometasone-formoterol  2 puff Inhalation BID     Continuous Infusions:    PN-Adult 2-in-1 Central Line (Standard) 65 mL/hr at 19 2200    dextrose      lactated ringers 40 mL/hr at 19 0038     PRN Meds: fentanNYL **OR** fentanNYL, LORazepam, glucose, dextrose, glucagon (rDNA), dextrose, sodium phosphate IVPB **OR** sodium phosphate IVPB, sodium chloride flush, ondansetron, potassium chloride, magnesium sulfate    Data:     Past Medical History:   has a past medical history of Abnormal computed tomography of cervical spine, CVA (cerebral vascular accident) ([address] GERD (gastroesophageal reflux disease), Hypertension, Paraproteinemia, and Weight loss. Social History:   reports that she has been smoking. She has a 30.00 pack-year smoking history. She has never used smokeless tobacco. She reports that she drank about 16.8 oz of alcohol per week. She reports that she does not use drugs. Family History: History reviewed. No pertinent family history. Vitals:  BP 88/63   Pulse (!) 40   Temp 98.1 °F (36.7 °C) (Axillary)   Resp 18   Ht 5' (1.524 m)   Wt 102 lb 4.7 oz (46.4 kg)   SpO2 94%   BMI 19.98 kg/m²   Temp (24hrs), Av.9 °F (36.6 °C), Min:97.3 °F (36.3 °C), Max:98.7 °F (37.1 °C)    Recent Labs     19  0006 19  0553 19  0657 19  1144   POCGLU 170* 129* 129* 143*       I/O(24Hr):     Intake/Output Summary (Last 24 hours) at 2019 1353  Last data filed at 2019 1301  Gross per 24 hour   Intake 200 ml   Output 2015 ml   Net -1815 ml       Labs:    Lab Results   Component Value Date    WBC 18.0 (H) 2019    HGB 7.3 (L) 2019    HCT 22.9 (L) 2019    MCV 87.7 2019     2019     Lab Results   Component Value Date     2019    K 3.9 2019     (H) 2019    CO2 18 (L) FINDINGS: AP portable view of the chest time stamped at 603 hours demonstrates findings of generalized COPD. Overlying cardiac monitoring electrodes are present. An intestinal tube extends below the fundus of the stomach, tip not included. Right central line terminates in the distal superior vena cava. Bipolar pacemaker enters from the left with intact leads in appropriate positions. Heart size is stable, top-normal.  Lung fields are hyperinflated suggestive of COPD. Interstitial markings are coarsened, similar to that noted in 2015 likely chronic interstitial change. There is improved aeration at the left lung base with slight blunting of the left costophrenic angle suggesting effusion. There is been interval clearing of right basilar opacities likely resolved atelectasis. Osseous and mediastinal structures are age-appropriate. No vascular congestion or extrapleural air is noted. Improved aeration of both lung bases with resolved right basilar opacity. Mild left basilar opacity persists with blunting of the left costophrenic angle with small effusion on the left suspected. Findings of COPD and suspected chronic fibrotic change are noted. No extrapleural air. Tubes and lines as above. Xr Chest (single View Frontal)    Result Date: 5/2/2019  EXAMINATION: SINGLE XRAY VIEW OF THE CHEST 5/2/2019 6:34 am COMPARISON: 29 April 2019 HISTORY: ORDERING SYSTEM PROVIDED HISTORY: COPD TECHNOLOGIST PROVIDED HISTORY: COPD Ordering Physician Provided Reason for Exam: COPD Acuity: Acute Type of Exam: Initial FINDINGS: AP portable view of the chest time stamped at 630 hours demonstrates stable cardiac size. Overlying cardiac monitoring electrodes are present. Right-sided PICC line terminates in the right atrium. Bipolar pacemaker enters from the left with intact leads in appropriate positions.   There is been no significant interval change in bilateral interstitial opacities, representing interval change since 2015.  This may be related to worsening interstitial disease or superimposed venous congestion. Bilateral effusions are noted with bibasilar opacities, either atelectasis or edema. Continued bilateral effusions, bibasilar opacities and bilateral interstitial opacities in the upper lobes. Findings may be related to worsening interstitial disease and edema. Airspace disease is not excluded. Xr Chest Standard (2 Vw)    Result Date: 4/29/2019  EXAMINATION: TWO VIEWS OF THE CHEST 4/29/2019 8:04 pm COMPARISON: 04/27/2019 HISTORY: ORDERING SYSTEM PROVIDED HISTORY: Follow-up lung infiltrate TECHNOLOGIST PROVIDED HISTORY: Follow-up lung infiltrate Ordering Physician Provided Reason for Exam: Follow-up infiltrate. Pt unable to lean forward - unable to get proper sponge behind pt for lateral. Pt unable to raise left arm at all for lateral. Best possible lateral obtained. Acuity: Unknown Type of Exam: Unknown Additional signs and symptoms: Follow-up infiltrate. Pt unable to lean forward - unable to get proper sponge behind pt for lateral. Pt unable to raise left arm at all for lateral. Best possible lateral obtained. FINDINGS: Left chest cardiac pacemaker device is in place. Right IJ central venous catheter in place with distal tip at the cavoatrial junction. Heart size is within normal limits. No vascular congestion. There are small to moderate pleural effusions. There are interstitial opacities in the upper lungs, which could be related to scarring and/or developing infiltrate, slightly improved in appearance when compared to 04/27/2019. No evidence of pneumothorax. 1.  Small to moderate bilateral pleural effusions, better visualized on lateral view. 2.  Upper lobe interstitial opacities, slightly improved when compared to the previous study, possibly related to underlying fibrotic change or residual infiltrate.      Xr Knee Left (1-2 Views)    Result Date: 5/8/2019  EXAMINATION: 2 XRAY VIEWS OF THE LEFT Ordering Physician Provided Reason for Exam: SMALL BOWEL FOLLOW THRU - 20 HOUR DELAYED FILM FINDINGS: Significant interval decreased amount of retained contrast in the stomach compared to last image from earlier small bowel series which likely relates to suctioning of the contrast.  Majority of previously seen contrast within the pelvis likely in the rectum is no longer visualized and has likely passed through. Residual contrast remains within the colon and small bowel. NG tube is in place with side hole in the region of the GE junction. Partially visualized mild left pleural effusion associated atelectasis. 1. Interval presumed suctioning of contrast from the stomach which now appears relatively empty. Continued progression of contrast through the small and large bowel as described. 2. NG tube side hole at the level of the GE junction, consider advancement of 2-3 cm. Xr Abdomen (kub) (single Ap View)    Result Date: 5/8/2019  EXAMINATION: SINGLE SUPINE XRAY VIEW(S) OF THE ABDOMEN 5/8/2019 8:59 am COMPARISON: CT abdomen from 05/05/2019, and KUB from 04/19/2019 HISTORY: ORDERING SYSTEM PROVIDED HISTORY: recheck obstruction TECHNOLOGIST PROVIDED HISTORY: recheck obstruction Ordering Physician Provided Reason for Exam: recheck obstruction Acuity: Unknown Type of Exam: Unknown FINDINGS: Again noted cholecystostomy tube, electrode leads from a pacemaker overlying the heart, and overlying electrode leads/tubing. NG tube no longer demonstrated. Gas-filled bowel loops without marked dilatation; cecum measures 8 cm, slightly increased as compared to the previous study. No obvious free air. No mass or organomegaly. Bones unchanged. Retrocardiac opacity, hyperinflated lungs and probable pleural effusions. NG tube removed. Gas-filled bowel more suggestive ileus; no clear cut obstruction. Cecum measures 8 cm. Cholecystostomy tube in unchanged position. Additional unchanged findings, as above. RECOMMENDATION: Continued clinical correlation and follow-up     Ct Abdomen W Contrast Additional Contrast? Radiologist Recommendation    Result Date: 5/5/2019  EXAMINATION: CT ABDOMEN WITH CONTRAST, 5/5/2019 3:38 pm TECHNIQUE: CT of the abdomen was performed with the administration of intravenous contrast. Multiplanar reformatted images are provided for review. Dose modulation, iterative reconstruction, and/or weight based adjustment of the mA/kV was utilized to reduce the radiation dose to as low as reasonably achievable. COMPARISON: April 14, 2019 HISTORY: ORDERING SYSTEM PROVIDED HISTORY: Check for abscess/fluid collection around ashley tube site. TECHNOLOGIST PROVIDED HISTORY: Ordering Physician Provided Reason for Exam: Patient c/o abd pain around her ashley site and drainage tube. FINDINGS: Evaluation is limited by motion. Lower Chest: Moderate bilateral pleural effusions. Organs: Multiple liver hypodensities measuring up to 4 mm are incompletely characterized but in the absence of risk factors likely represent cysts requiring no specific follow-up. Cholecystostomy tube is in place. No adjacent focal drainable fluid collection. No pancreatic lesion. No splenomegaly. No adrenal lesion. No hydronephrosis. No renal lesion. GI/Bowel: Stomach is markedly distended. Swirled appearance of the mesentery is seen within the mid to lower abdomen with adjacent thickened loops of small bowel. Peritoneum/Retroperitoneum: Atherosclerotic calcification of the abdominal aorta without aneurysmal dilatation. No adenopathy. Bones/Soft Tissues: No acute soft tissue abnormality. Diffuse osteopenia. Lumbar spine degenerative changes. Bowel obstruction which is suspected to be caused by an internal hernia. Cholecystostomy tube is in place. No surrounding fluid collection.      Nm Gi Blood Loss    Result Date: 5/3/2019  EXAMINATION: NUCLEAR MEDICINE GASTRIC BLEEDING STUDY 5/3/2019 TECHNIQUE: Following the intravenous injection of 23.8 mCi of 99 mTc-labeled RBC's, a flow study and standard images of the abdomen was obtained over a total period of 60 minutes COMPARISON: None. HISTORY: ORDERING SYSTEM PROVIDED HISTORY: GI BLEEDING TECHNOLOGIST PROVIDED HISTORY: Ordering Physician Provided Reason for Exam: GI bleeding Acuity: Unknown Type of Exam: Unknown FINDINGS: Activity is seen within the blood pool, including the great vessels, heart, liver, and spleen. There was no abnormal accumulation of radioactivity in the abdomen to suggest active bleeding during study acquisition. No evidence of active GI bleeding during acquisition. RECOMMENDATIONS: If the patient shows hemodynamic signs of an active bleed in the next 20 hours, additional images can be acquired. Kate Jacobs Reddish Device Plmt/replace/removal    Result Date: 4/30/2019  PROCEDURE: ULTRASOUND GUIDED VASCULAR ACCESS. FLUOROSCOPY GUIDED PICC PLACEMENT 4/30/2019. HISTORY: ORDERING SYSTEM PROVIDED HISTORY: TPN TECHNOLOGIST PROVIDED HISTORY: TPN Lumen?->Double Lumen SEDATION: None FLUOROSCOPY DOSE AND TYPE OR TIME AND EXPOSURES: 7 seconds; D  TECHNIQUE: This procedure was performed by Dr. Milagros Tony. Informed consent was obtained after a detailed explanation of the procedure including risks, benefits, and alternatives. Universal protocol was observed. The right arm was prepped and draped in sterile fashion using maximum sterile barrier technique. Local anesthesia was achieved with lidocaine. A micropuncture needle was used to access the right basilic vein using ultrasound guidance. An ultrasound image demonstrating patency of the vein with needle tip located within it. An image was obtained and stored in PACs. A 0.018 guidewire was used to place a peel-a-way sheath and a 5 Serbian dual-lumen PICC was advanced with fluoroscopic guidance with the tip at the cavo-atrial junction. The catheter flushed easily and there was a good blood return.  The catheter was secured to the skin. The patient tolerated the procedure well and there were no immediate complications. FINDINGS: Fluoroscopic image demonstrates the tip of the catheter at the cavo-atrial junction. Successful ultrasound and fluoroscopy guided PICC placement     Xr Chest Portable    Result Date: 5/20/2019  EXAMINATION: ONE XRAY VIEW OF THE CHEST 5/20/2019 6:03 am COMPARISON: 05/19/2019 HISTORY: ORDERING SYSTEM PROVIDED HISTORY: Vent TECHNOLOGIST PROVIDED HISTORY: Vent Ordering Physician Provided Reason for Exam: on vent Acuity: Acute Type of Exam: Initial FINDINGS: Endotracheal tube terminates 2.5 cm above the ron. Enteric tube courses below left hemidiaphragm. Right upper extremity PICC terminates overlying the expected location of the cavoatrial junction. Implantable cardiac device leads overlying the right atrium and ventricle. Small to moderate left and small right pleural effusions with adjacent airspace disease, likely atelectasis. Generalized osteopenia. Dense atherosclerotic calcifications of the aortic arch. Small to moderate left and small right pleural effusions with adjacent airspace disease, likely atelectasis. Support tubes and lines in grossly unchanged positioning. Xr Chest Portable    Result Date: 5/19/2019  EXAMINATION: ONE XRAY VIEW OF THE CHEST 5/19/2019 6:01 am COMPARISON: 05/18/2019 HISTORY: ORDERING SYSTEM PROVIDED HISTORY: Vent TECHNOLOGIST PROVIDED HISTORY: Vent Ordering Physician Provided Reason for Exam: Check vent placement Acuity: Unknown Type of Exam: Unknown Follow-up. FINDINGS: The cardiac silhouette and mediastinal contours are stable. Vascular calcifications are noted along the aortic arch. Support tubes and lines are unchanged. Left-sided cardiac device is stable. There is mild pulmonary vascular congestion. There is an infiltrate in the left lung base, likely related to pleural effusion with associated atelectasis, stable.   There is a tiny right pleural effusion with minimal right basilar atelectasis. No new lung infiltrate. 1. Stable pulmonary vascular congestion. 2. Small bilateral pleural effusions with associated bibasilar atelectasis, left side greater than right, unchanged. Xr Chest Portable    Result Date: 5/18/2019  EXAMINATION: ONE XRAY VIEW OF THE CHEST 5/18/2019 6:37 am COMPARISON: Chest radiograph performed 05/17/2019. HISTORY: ORDERING SYSTEM PROVIDED HISTORY: Vent TECHNOLOGIST PROVIDED HISTORY: Vent Ordering Physician Provided Reason for Exam: Post Extubation Acuity: Chronic Type of Exam: Ongoing FINDINGS: There is mild bilateral congestion. There are bibasilar effusions with the left being larger than the right. There is no pneumothorax. Heart is prominent with stable cardiac leads. The upper abdomen is unremarkable. There is a right-sided PICC line. There is endotracheal tube in stable position. There is a gastric tube in stable position. Cardiomegaly and bilateral pulmonary congestion and bibasilar effusions. Similar-appearing support tubes. Xr Chest Portable    Result Date: 5/17/2019  EXAMINATION: ONE XRAY VIEW OF THE CHEST 5/17/2019 6:59 am COMPARISON: May 16, 2019 HISTORY: ORDERING SYSTEM PROVIDED HISTORY: Vent TECHNOLOGIST PROVIDED HISTORY: Vent Ordering Physician Provided Reason for Exam: on vent Acuity: Acute Type of Exam: Initial FINDINGS: ET tube is in slightly improved position with the tip projecting 5 cm from the ron. .  NG tube beneath the diaphragm. Cardiac monitoring leads overlie the chest.  Right upper extremity PICC in good position. Implanted cardiac device is present. Calcified plaquing of the aorta. Borderline cardiomegaly. Central vascular congestion. Left-sided effusion and associated airspace disease, stable from prior. No pneumothorax. Stable chronic pulmonary changes. Stable left basilar pleural-parenchymal process. Slightly improved ET tube position. Chronic pulmonary changes.      Joelene Hilts Endotracheal tube tip is approximately 6 cm above the ron. Nasogastric tube tip is below the diaphragm but not included on this study. There are skin staples in the upper abdomen. Dual-chamber left subclavian pacemaker and right arm PICC line remain in place. Again shown are COPD changes with prominent interstitial markings. No significant change in left basilar airspace disease with left pleural effusion. Haziness at the right base suggests a small effusion and atelectasis. The bones are osteopenic. There appears to be a small amount of free intraperitoneal air in the midline upper abdomen related to the recent surgical procedure. Endotracheal tube tip is approximately 6 cm above the ron. Xr Chest Portable    Result Date: 5/13/2019  EXAMINATION: ONE XRAY VIEW OF THE CHEST 5/13/2019 6:25 am COMPARISON: May 11, 2019 HISTORY: ORDERING SYSTEM PROVIDED HISTORY: Follow up aspiration pneumonia TECHNOLOGIST PROVIDED HISTORY: Follow up aspiration pneumonia Ordering Physician Provided Reason for Exam: follow up aspiration pneumonia Acuity: Acute Type of Exam: Initial FINDINGS: Implanted cardiac device is present. Stable cardiomediastinal contours. Extensively calcified aorta. Right upper extremity PICC in good position. Left basilar atelectasis with airspace disease and effusion, increased from prior. No pneumothorax. COPD with prominent interstitial markings. Removal of NG tube since prior exam.     New left effusion and associated airspace disease. Differential includes atelectasis versus pneumonia. Xr Chest Portable    Result Date: 5/10/2019  EXAMINATION: ONE XRAY VIEW OF THE CHEST 5/10/2019 1:28 am COMPARISON: May 2, 2019. HISTORY: ORDERING SYSTEM PROVIDED HISTORY: low o2 sat TECHNOLOGIST PROVIDED HISTORY: low o2 sat Ordering Physician Provided Reason for Exam: Low o2 sat Acuity: Acute Type of Exam: Initial FINDINGS: Hyperexpanded right lung volume.   Left greater than right basilar device is stable. Cardiac leads overlie the chest.  Stable cardiomediastinal contours with calcified aortic arch. Left effusion and associated airspace disease, slightly decreased. Chronic blunting of right costophrenic sulcus may represent scarring or chronic effusion. Increased reticular markings right upper chest and left upper chest possibly interstitial edema or interstitial pneumonia. No new airspace consolidation. Increasing reticular markings upper chest bilaterally right greater than left possibly due to edema or interstitial pneumonitis. Improving left effusion with associated retrocardiac airspace disease. Pleural-parenchymal scarring or effusion in the right lung base, stable. Vl Dup Lower Extremity Venous Bilateral    Result Date: 5/7/2019    [facility]  Vascular Lower Extremities DVT Study Procedure   Patient Name   [facility]     Date of Study           05/07/2019                 Darline Age   Date of Birth  1950  Gender                  Female   Age            77 year(s)  Race                       Room Number    2123        Height:                 59.84 inch, 152 cm   Corporate ID # [id number]    Weight:                 102 pounds, 46.3 kg   Patient Acct # [de-identified]   BSA:        1.4 m^2     BMI:      20.03 kg/m^2   MR #           [id number]      Sonographer             Lavinia Kussmaul, T   Accession #    [id number]   Interpreting Physician  Kiara Raza   Referring                  Referring Physician     Charu Pretty MD  Nurse  Practitioner  Procedure Type of Study:   Veins: Lower Extremities DVT Study, Venous Scan Lower Bilateral.  Indications for Study:Pain and swelling. Patient Status: In Patient. Technical Quality:Limited visualization. Limitation reason:patient movement. Conclusions   Summary   No evidence of superficial or deep venous thrombosis in both lower  extremities. Technically limited visualization.    Signature ----------------------------------------------------------------  Electronically signed by Lauro Wahl RVT(Sonographer) on  05/07/2019 01:22 PM  ----------------------------------------------------------------   ----------------------------------------------------------------  Electronically signed by Devang Oliveira(Interpreting  physician) on 05/07/2019 06:45 PM  ----------------------------------------------------------------  Findings:   Right Impression:                    Left Impression:  The common femoral, femoral,         The common femoral, femoral,  popliteal and tibial veins           popliteal and tibial veins  demonstrate normal compressibility   demonstrate normal compressibility  and augmentation. and augmentation. Limited visualization of the calf    Limited visualization of the calf  veins. veins. Normal compressibility of the great  Normal compressibility of the great  saphenous vein. saphenous vein. Normal compressibility of the small  Normal compressibility of the small  saphenous vein. saphenous vein. Velocities are measured in cm/s ; Diameters are measured in cm Right Lower Extremities DVT Study Measurements Right 2D Measurements +------------------------------------+----------+---------------+----------+ ! Location                            ! Visualized! Compressibility! Thrombosis! +------------------------------------+----------+---------------+----------+ ! Common Femoral                      !Yes       ! Yes            ! None      ! +------------------------------------+----------+---------------+----------+ ! Prox Femoral                        !Yes       ! Yes            ! None      ! +------------------------------------+----------+---------------+----------+ ! Mid Femoral                         !Yes       ! Yes            ! None      ! +------------------------------------+----------+---------------+----------+ ! Dist Femoral                        !Yes       ! Yes            ! None      ! +------------------------------------+----------+---------------+----------+ ! Deep Femoral                        !Yes       ! Yes            ! None      ! +------------------------------------+----------+---------------+----------+ ! Popliteal                           !Yes       ! Yes            ! None      ! +------------------------------------+----------+---------------+----------+ ! Sapheno Femoral Junction            ! Yes       ! Yes            ! None      ! +------------------------------------+----------+---------------+----------+ ! PTV                                 ! Partial   !Yes            ! None      ! +------------------------------------+----------+---------------+----------+ ! Peroneal                            !Partial   !Yes            ! None      ! +------------------------------------+----------+---------------+----------+ ! Gastroc                             ! Yes       ! Yes            ! None      ! +------------------------------------+----------+---------------+----------+ ! GSV Thigh                           ! Yes       ! Yes            ! None      ! +------------------------------------+----------+---------------+----------+ ! GSV Knee                            ! Yes       ! Yes            ! None      ! +------------------------------------+----------+---------------+----------+ ! GSV Ankle                           ! Yes       ! Yes            ! None      ! +------------------------------------+----------+---------------+----------+ ! SSV                                 ! Partial   !Yes            ! None      ! +------------------------------------+----------+---------------+----------+ Left Lower Extremities DVT Study Measurements Left 2D Measurements +------------------------------------+----------+---------------+----------+ ! Location !Visualized! Compressibility! Thrombosis! +------------------------------------+----------+---------------+----------+ ! Common Femoral                      !Yes       ! Yes            ! None      ! +------------------------------------+----------+---------------+----------+ ! Prox Femoral                        !Yes       ! Yes            ! None      ! +------------------------------------+----------+---------------+----------+ ! Mid Femoral                         !Yes       ! Yes            ! None      ! +------------------------------------+----------+---------------+----------+ ! Dist Femoral                        !Yes       ! Yes            ! None      ! +------------------------------------+----------+---------------+----------+ ! Deep Femoral                        !Yes       ! Yes            ! None      ! +------------------------------------+----------+---------------+----------+ ! Popliteal                           !Yes       ! Yes            ! None      ! +------------------------------------+----------+---------------+----------+ ! Sapheno Femoral Junction            ! Yes       ! Yes            ! None      ! +------------------------------------+----------+---------------+----------+ ! PTV                                 ! Partial   !Yes            ! None      ! +------------------------------------+----------+---------------+----------+ ! Peroneal                            !Partial   !Yes            ! None      ! +------------------------------------+----------+---------------+----------+ ! Gastroc                             ! Yes       ! Yes            ! None      ! +------------------------------------+----------+---------------+----------+ ! GSV Thigh                           ! Yes       ! Yes            ! None      ! +------------------------------------+----------+---------------+----------+ ! GSV Knee                            ! Yes       ! Yes            ! None      ! +------------------------------------+----------+---------------+----------+ ! GSV Ankle                           ! Yes       ! Yes            ! None      ! +------------------------------------+----------+---------------+----------+ ! SSV                                 ! Partial   !Yes            ! None      ! +------------------------------------+----------+---------------+----------+    Fl Small Bowel Follow Through Only    Result Date: 5/10/2019  EXAMINATION: SMALL BOWEL FOLLOW THROUGH SERIES 5/9/2019 TECHNIQUE: Small bowel follow through series was performed with overhead images. FLUOROSCOPY DOSE AND TYPE OR TIME AND EXPOSURES: No fluoroscopic images obtained. COMPARISON: CT abdomen pelvis 05/05/2019 HISTORY: ORDERING SYSTEM PROVIDED HISTORY: internal hernia TECHNOLOGIST PROVIDED HISTORY: Water soluble contrast only. Study to be done ONLY if NGT is placed by IR internal hernia FINDINGS:  image demonstrates NG tube overlying the left side of the abdomen within the stomach. Surgical drain overlies the right side of the abdomen. There is a nonspecific bowel gas pattern with mild dilated loops of bowel in lower abdomen. Initial images demonstrate filling of the stomach. At 1 hour and 45 minutes no significant contrast is noted in the small bowel. The 4-1/2 hour image demonstrates contrast within loops of bowel within the mid and lower abdomen and pelvis. There appears to be contrast extending to the colon in the right side of the abdomen. Contrast is noted within the pelvis which may be within the bladder or rectum. Significant delay in emptying of the stomach with persistent contrast noted at the 6 hour concha. There is contrast extending into the bowel which appears to be in the colon. Subtle findings are limited. Consider follow-up radiograph of the abdomen in 6-8 hours.      [facility] Ng Tube By Dr Sunny Wayne    Result Date: 5/9/2019  PROCEDURE: XR PLACE NASOGASTRIC TUBE PHYS 5/9/2019 HISTORY: ORDERING SYSTEM PROVIDED HISTORY: ileus TECHNOLOGIST PROVIDED HISTORY: ileus Ordering Physician Provided Reason for Exam: ILEUS Acuity: Unknown Type of Exam: Unknown CONTRAST: None SEDATION: None FLUOROSCOPY DOSE AND TYPE OR TIME AND EXPOSURES: 21 seconds; D AP 46 DESCRIPTION OF PROCEDURE AND FINDINGS: This procedure was performed by Dr. Herminio Nixon. Under intermittent fluoroscopic guidance a 12 [place] nasogastric tube was placed through the right nostril and directed under fluoroscopy into the stomach. A small amount of air was injected confirming the tip location in the stomach. Partially visualized cholecystostomy tube and pacer wires. Tube was secured in place to the patient's nose with an adhesive dressing. 12 [place] nasogastric tube placed successfully with its tip in the stomach. Physical Examination:        Physical Exam   Constitutional: She is oriented to person, place, and time. HENT:   Mouth/Throat: Oropharynx is clear and moist.   Eyes: Conjunctivae are normal.   Neck: Neck supple. Cardiovascular: Normal rate, regular rhythm and normal heart sounds. Pulmonary/Chest: Effort normal and breath sounds normal.   Abdominal: Soft. She exhibits no distension. There is no tenderness. Musculoskeletal: She exhibits no edema or tenderness. Neurological: She is alert and oriented to person, place, and time. Skin: Skin is warm and dry. Nursing note and vitals reviewed.         Assessment:        Primary Problem  Acute respiratory failure [facility])    Active Hospital Problems    Diagnosis Date Noted    Bradycardia [R00.1]     Bacteremia due to coagulase-negative Staphylococcus [R78.81]     Chronic obstructive pulmonary disease, unspecified ([facility] [address] [J44.9] 05/24/2019    Acute respiratory failure ([facility] [address] [facility] [J96.00] 05/18/2019    Small bowel obstruction ([facility] [facility] [K56.609]     Anxiety disorder [F41.9]     Noncompliance [Z91.19]     Anemia [D64.9]     GI bleed [K92.2] 05/03/2019    Hemorrhage of rectum and anus [K62.5]     Centrilobular emphysema ([address] [J43.2] 04/30/2019    CRP elevated [R79.82]     Elevated procalcitonin [R79.89]     Bandemia [D72.825]     Cholecystitis [K81.9]     Abdominal distention [R14.0]     Elevated CEA [R97.0]     Elevated CA 19-9 level [R97.8]     Urinary retention [R33.9]     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]     Septic shock ([address] [A41.9, R65.21]     Leukemoid reaction [D72.823]     Aspiration pneumonia of right lower lobe due to vomit ([address] [J69.0]     MRSA carrier [Z22.322]     Sepsis due to urinary tract infection ([address] [A41.9, N39.0] 04/11/2019    Cystitis [N30.90] 04/11/2019       Plan:        Acute respiratory failure s/p POD #10 ex-lap, open cholecystectomy, right colectomy w/ ileocolic anastomosis, extensive enterolysis, improved  Pulm following - appreciate recs    duoneb q4h   ID consulted - appreciate recs    Ab discontinued 5/24, cont. To monitor    Blood cx NGTD   Surgery consulted - appreciate recs    Take back to OR for washout, retention sutures needed vs wound vac      Anemia 2/2 ABLA vs. AOCD, stable  Hgb 9.2      Paroxysmal A.fib - resolved   Cardiology consulted - appreciate recs   Reduced amio to 100mg    Signed off       Hypothyroid  Levothyroxine 50mcg     DVT PPx  Lovenox    Dispo  PT/OT  SW following    [id number] by Dr. Rubén Her [facility] Tuesday       Toña Ramirez MD  5/26/2019  1:53 PM     Attestation and add on       I have discussed the care of Di Bah , including pertinent history and exam findings,    today with the resident. I have seen and examined the patient and the key elements of all parts of the encounter have been performed by me . I agree with the assessment, plan and orders as documented by the resident.      Principal Problem:    Acute respiratory failure (HCC)  Active Problems:    Sepsis due to urinary tract infection ([address]    Cystitis    Emphysematous cystitis    Septic shock (HCC)    Leukemoid reaction    Aspiration pneumonia of right lower lobe due to vomit (HCC)    MRSA carrier    Urinary retention    Abdominal distention    Elevated CEA    Elevated CA 19-9 level    Cholecystitis    CRP elevated    Elevated procalcitonin    Bandemia    Centrilobular emphysema (HCC)    Hemorrhage of rectum and anus    GI bleed    Anemia    Small bowel obstruction (HCC)    Anxiety disorder    Noncompliance    Chronic obstructive pulmonary disease, unspecified ([facility] [address]    Bacteremia due to coagulase-negative Staphylococcus    Bradycardia  Resolved Problems:    * No resolved hospital problems. *            --Patient delirium is improving she is following simple commands and connecting somewhat  Yesterday she had surgery to address wound dehiscence  Postop stable  Noted to have bradycardia she is on amiodarone and beta blocker we will discontinue beta blocker continue amiodarone check EKG in a.m. Known to have hypothyroidism we will recheck TSH and free T4 tomorrow-- ;     [address]  [address] [facility], [address] [place] [address].    Phone [phone]   Fax: [phone]  Answering Service: [phone]

## 2019-05-26 NOTE — PLAN OF CARE
Problem: Falls - Risk of:  Goal: Will remain free from falls  Description  Will remain free from falls  Outcome: Met This Shift  Note:   The patient remained free from falls this shift, call light within reach, bed in locked and lowest position. Side rails up x2. Continue to monitor closely. Problem: Risk for Impaired Skin Integrity  Goal: Tissue integrity - skin and mucous membranes  Description  Structural intactness and normal physiological function of skin and  mucous membranes. Outcome: Ongoing  Note:   Skin assessment complete. Waffle mattress in place. Pt turned and repositioned every two hours with assistance from staff. Area kept free from moisture. Proper nourishment and fluids encouraged, as appropriate. Skin remains clean, dry, and intact. Will continue to monitor for additional needs and changes in skin breakdown.       Problem: Pain:  Goal: Pain level will decrease  Description  Pain level will decrease  Outcome: Not Met This Shift     Problem: Pain:  Goal: Control of acute pain  Description  Control of acute pain  Outcome: Not Met This Shift

## 2019-05-26 NOTE — FLOWSHEET NOTE
05/26/19 1414   Encounter Summary   Services provided to: Patient   Referral/Consult From: Palliative Care   Complexity of Encounter Low   Length of Encounter 15 minutes   Spiritual/Hinduism   Type Spiritual support   Assessment Sleeping   Intervention Prayer   Outcome Did not respond

## 2019-05-26 NOTE — PROGRESS NOTES
Nutrition Assessment (Parenteral Nutrition)    Type and Reason for Visit: Reassess    Nutrition Recommendations:   1. Continue NPO status, TPN at 65 mL/hr and 100 mL of lipids. Following changes made to additives: Na acetate: 40 to 60 mEq, NaCl: 80 to 60 mEq, K+ acetate: 0 to 15 mEq, and KCl: 10 to 0 mEq. 2. Start Tube feeding: Vital 1.2 Reji at 10 mL/hr per physician's direction. Recommend goal rate of 55 mL/hr (advance rate per physician approval)    3. Start Calorie count 5/27/19    Nutrition Assessment: Patient remains stable from a nutritional standpoint with TPN and lipids meeting needs. Patient remains at risk for compromise due to NPO status and being status post surgical repair of abdominal incision dehiscence and G-tube placement (5/25). Will continue TPN at 65 mL/hr and 100 mL of lipids. Recommend Vital 1.2 Reji at goal rate 55 mL/hr continuous. Will start tube feeding at 10 mL/hr and continue TPN at 65 mL/hr and 100 mL of lipids. Diet advanced to Dental Soft diet, calorie count will start 5/27/19. Will monitor nutrition progression. Malnutrition Assessment:  · Malnutrition Status: At risk for malnutrition  · Context: Acute illness or injury  · Findings of the 6 clinical characteristics of malnutrition (Minimum of 2 out of 6 clinical characteristics is required to make the diagnosis of moderate or severe Protein Calorie Malnutrition based on AND/ASPEN Guidelines):  1. Energy Intake-Greater than 75% of estimated energy requirement, (Variable at times; over 75% of needs met for majority of admission)    2. Weight Loss-Unable to assess(edema present),    3. Fat Loss-Unable to assess(Pt is thin; edema present; no known recent loss of muscle or fat),    4. Muscle Loss-Unable to assess,    5. Fluid Accumulation-Moderate to severe fluid accumulation, Extremities, Generalized  6.   Strength-Not measured    Nutrition Risk Level: High    Nutrient Needs:  · Estimated Daily Total Kcal: 1672-7617 based on Intake, PN Tolerance, Skin Integrity, Wound Healing, I&O      Bryan Carl DAVIDSON R.D, L.D,  Clinical Dietitian  Cell # [phone]  Office # [phone]

## 2019-05-27 LAB
ABSOLUTE EOS #: 0.1 K/UL (ref 0–0.4)
ABSOLUTE IMMATURE GRANULOCYTE: ABNORMAL K/UL (ref 0–0.3)
ABSOLUTE LYMPH #: 1 K/UL (ref 1–4.8)
ABSOLUTE MONO #: 1.2 K/UL (ref 0.1–1.3)
ANION GAP SERPL CALCULATED.3IONS-SCNC: 10 MMOL/L (ref 9–17)
BASOPHILS # BLD: 1 % (ref 0–2)
BASOPHILS ABSOLUTE: 0.1 K/UL (ref 0–0.2)
BUN BLDV-MCNC: 29 MG/DL (ref 8–23)
BUN/CREAT BLD: ABNORMAL (ref 9–20)
CALCIUM SERPL-MCNC: 7.4 MG/DL (ref 8.6–10.4)
CHLORIDE BLD-SCNC: 106 MMOL/L (ref 98–107)
CO2: 21 MMOL/L (ref 20–31)
CREAT SERPL-MCNC: <0.4 MG/DL (ref 0.5–0.9)
DIFFERENTIAL TYPE: ABNORMAL
EOSINOPHILS RELATIVE PERCENT: 1 % (ref 0–4)
FOLATE: 9 NG/ML
GFR AFRICAN AMERICAN: ABNORMAL ML/MIN
GFR NON-AFRICAN AMERICAN: ABNORMAL ML/MIN
GFR SERPL CREATININE-BSD FRML MDRD: ABNORMAL ML/MIN/{1.73_M2}
GFR SERPL CREATININE-BSD FRML MDRD: ABNORMAL ML/MIN/{1.73_M2}
GLUCOSE BLD-MCNC: 82 MG/DL (ref 65–105)
GLUCOSE BLD-MCNC: 82 MG/DL (ref 65–105)
GLUCOSE BLD-MCNC: 89 MG/DL (ref 65–105)
GLUCOSE BLD-MCNC: 97 MG/DL (ref 70–99)
HCT VFR BLD CALC: 22.1 % (ref 36–46)
HCT VFR BLD CALC: 23.7 % (ref 36–46)
HCT VFR BLD CALC: 24.1 % (ref 36–46)
HEMOGLOBIN: 6.9 G/DL (ref 12–16)
HEMOGLOBIN: 7.8 G/DL (ref 12–16)
HEMOGLOBIN: 8 G/DL (ref 12–16)
IMMATURE GRANULOCYTES: ABNORMAL %
LYMPHOCYTES # BLD: 8 % (ref 24–44)
MAGNESIUM: 1.7 MG/DL (ref 1.6–2.6)
MCH RBC QN AUTO: 27.8 PG (ref 26–34)
MCHC RBC AUTO-ENTMCNC: 31.3 G/DL (ref 31–37)
MCV RBC AUTO: 88.8 FL (ref 80–100)
MONOCYTES # BLD: 10 % (ref 1–7)
NRBC AUTOMATED: ABNORMAL PER 100 WBC
PDW BLD-RTO: 16.4 % (ref 11.5–14.9)
PHOSPHORUS: 2.6 MG/DL (ref 2.6–4.5)
PLATELET # BLD: 176 K/UL (ref 150–450)
PLATELET ESTIMATE: ABNORMAL
PMV BLD AUTO: 9.6 FL (ref 6–12)
POTASSIUM SERPL-SCNC: 2.9 MMOL/L (ref 3.7–5.3)
RBC # BLD: 2.49 M/UL (ref 4–5.2)
RBC # BLD: ABNORMAL 10*6/UL
SEG NEUTROPHILS: 80 % (ref 36–66)
SEGMENTED NEUTROPHILS ABSOLUTE COUNT: 9.6 K/UL (ref 1.3–9.1)
SODIUM BLD-SCNC: 137 MMOL/L (ref 135–144)
T4 TOTAL: 3.6 UG/DL (ref 4.5–12)
THYROXINE, FREE: 0.66 NG/DL (ref 0.93–1.7)
TSH SERPL DL<=0.05 MIU/L-ACNC: 79.9 MIU/L (ref 0.3–5)
VITAMIN B-12: 607 PG/ML (ref 232–1245)
WBC # BLD: 11.9 K/UL (ref 3.5–11)
WBC # BLD: ABNORMAL 10*3/UL

## 2019-05-27 PROCEDURE — 6360000002 HC RX W HCPCS: Performed by: SURGERY

## 2019-05-27 PROCEDURE — 99232 SBSQ HOSP IP/OBS MODERATE 35: CPT | Performed by: INTERNAL MEDICINE

## 2019-05-27 PROCEDURE — 86900 BLOOD TYPING SEROLOGIC ABO: CPT

## 2019-05-27 PROCEDURE — 82746 ASSAY OF FOLIC ACID SERUM: CPT

## 2019-05-27 PROCEDURE — 6370000000 HC RX 637 (ALT 250 FOR IP): Performed by: SURGERY

## 2019-05-27 PROCEDURE — C9113 INJ PANTOPRAZOLE SODIUM, VIA: HCPCS | Performed by: SURGERY

## 2019-05-27 PROCEDURE — 36430 TRANSFUSION BLD/BLD COMPNT: CPT

## 2019-05-27 PROCEDURE — 82607 VITAMIN B-12: CPT

## 2019-05-27 PROCEDURE — 6360000002 HC RX W HCPCS: Performed by: STUDENT IN AN ORGANIZED HEALTH CARE EDUCATION/TRAINING PROGRAM

## 2019-05-27 PROCEDURE — 85025 COMPLETE CBC W/AUTO DIFF WBC: CPT

## 2019-05-27 PROCEDURE — 1200000000 HC SEMI PRIVATE

## 2019-05-27 PROCEDURE — 83735 ASSAY OF MAGNESIUM: CPT

## 2019-05-27 PROCEDURE — 94640 AIRWAY INHALATION TREATMENT: CPT

## 2019-05-27 PROCEDURE — 85018 HEMOGLOBIN: CPT

## 2019-05-27 PROCEDURE — 2580000003 HC RX 258: Performed by: SURGERY

## 2019-05-27 PROCEDURE — 84100 ASSAY OF PHOSPHORUS: CPT

## 2019-05-27 PROCEDURE — 86920 COMPATIBILITY TEST SPIN: CPT

## 2019-05-27 PROCEDURE — 85014 HEMATOCRIT: CPT

## 2019-05-27 PROCEDURE — P9016 RBC LEUKOCYTES REDUCED: HCPCS

## 2019-05-27 PROCEDURE — 86901 BLOOD TYPING SEROLOGIC RH(D): CPT

## 2019-05-27 PROCEDURE — 94761 N-INVAS EAR/PLS OXIMETRY MLT: CPT

## 2019-05-27 PROCEDURE — 2580000003 HC RX 258: Performed by: STUDENT IN AN ORGANIZED HEALTH CARE EDUCATION/TRAINING PROGRAM

## 2019-05-27 PROCEDURE — 86850 RBC ANTIBODY SCREEN: CPT

## 2019-05-27 PROCEDURE — 84439 ASSAY OF FREE THYROXINE: CPT

## 2019-05-27 PROCEDURE — 84443 ASSAY THYROID STIM HORMONE: CPT

## 2019-05-27 PROCEDURE — 82947 ASSAY GLUCOSE BLOOD QUANT: CPT

## 2019-05-27 PROCEDURE — 2500000003 HC RX 250 WO HCPCS: Performed by: SURGERY

## 2019-05-27 PROCEDURE — 36415 COLL VENOUS BLD VENIPUNCTURE: CPT

## 2019-05-27 PROCEDURE — 2700000000 HC OXYGEN THERAPY PER DAY

## 2019-05-27 PROCEDURE — 80048 BASIC METABOLIC PNL TOTAL CA: CPT

## 2019-05-27 PROCEDURE — 84436 ASSAY OF TOTAL THYROXINE: CPT

## 2019-05-27 RX ORDER — LEVOTHYROXINE SODIUM 0.07 MG/1
75 TABLET ORAL DAILY
Status: DISCONTINUED | OUTPATIENT
Start: 2019-05-28 | End: 2019-05-31 | Stop reason: HOSPADM

## 2019-05-27 RX ORDER — METOCLOPRAMIDE HYDROCHLORIDE 5 MG/ML
10 INJECTION INTRAMUSCULAR; INTRAVENOUS EVERY 6 HOURS
Status: DISCONTINUED | OUTPATIENT
Start: 2019-05-27 | End: 2019-05-30

## 2019-05-27 RX ORDER — DEXTROSE AND SODIUM CHLORIDE 5; .45 G/100ML; G/100ML
INJECTION, SOLUTION INTRAVENOUS CONTINUOUS
Status: DISCONTINUED | OUTPATIENT
Start: 2019-05-27 | End: 2019-05-31

## 2019-05-27 RX ORDER — 0.9 % SODIUM CHLORIDE 0.9 %
250 INTRAVENOUS SOLUTION INTRAVENOUS ONCE
Status: COMPLETED | OUTPATIENT
Start: 2019-05-27 | End: 2019-05-27

## 2019-05-27 RX ADMIN — IRON SUCROSE 200 MG: 20 INJECTION, SOLUTION INTRAVENOUS at 11:31

## 2019-05-27 RX ADMIN — I.V. FAT EMULSION 100 ML: 20 EMULSION INTRAVENOUS at 17:53

## 2019-05-27 RX ADMIN — Medication 2 PUFF: at 20:14

## 2019-05-27 RX ADMIN — Medication 10 ML: at 20:33

## 2019-05-27 RX ADMIN — CIPROFLOXACIN 400 MG: 2 INJECTION, SOLUTION INTRAVENOUS at 12:34

## 2019-05-27 RX ADMIN — IPRATROPIUM BROMIDE AND ALBUTEROL SULFATE 1 AMPULE: .5; 3 SOLUTION RESPIRATORY (INHALATION) at 06:47

## 2019-05-27 RX ADMIN — PANTOPRAZOLE SODIUM 40 MG: 40 INJECTION, POWDER, FOR SOLUTION INTRAVENOUS at 08:50

## 2019-05-27 RX ADMIN — DEXTROSE AND SODIUM CHLORIDE: 5; 450 INJECTION, SOLUTION INTRAVENOUS at 20:14

## 2019-05-27 RX ADMIN — CALCIUM GLUCONATE: 94 INJECTION, SOLUTION INTRAVENOUS at 17:56

## 2019-05-27 RX ADMIN — SODIUM CHLORIDE 250 ML: 9 INJECTION, SOLUTION INTRAVENOUS at 09:16

## 2019-05-27 RX ADMIN — LORAZEPAM 2 MG: 2 INJECTION INTRAMUSCULAR; INTRAVENOUS at 01:03

## 2019-05-27 RX ADMIN — Medication 10 ML: at 08:50

## 2019-05-27 RX ADMIN — FENTANYL CITRATE 50 MCG: 50 INJECTION INTRAMUSCULAR; INTRAVENOUS at 01:03

## 2019-05-27 RX ADMIN — POTASSIUM CHLORIDE: 2 INJECTION, SOLUTION, CONCENTRATE INTRAVENOUS at 15:20

## 2019-05-27 RX ADMIN — Medication 10 ML: at 08:52

## 2019-05-27 RX ADMIN — AMIODARONE HYDROCHLORIDE 100 MG: 100 TABLET ORAL at 20:13

## 2019-05-27 RX ADMIN — METRONIDAZOLE 500 MG: 500 INJECTION, SOLUTION INTRAVENOUS at 05:13

## 2019-05-27 RX ADMIN — FENTANYL CITRATE 50 MCG: 50 INJECTION INTRAMUSCULAR; INTRAVENOUS at 15:24

## 2019-05-27 RX ADMIN — IPRATROPIUM BROMIDE AND ALBUTEROL SULFATE 1 AMPULE: .5; 3 SOLUTION RESPIRATORY (INHALATION) at 18:59

## 2019-05-27 RX ADMIN — Medication 2 PUFF: at 08:51

## 2019-05-27 RX ADMIN — LEVOTHYROXINE SODIUM 50 MCG: 50 TABLET ORAL at 05:13

## 2019-05-27 RX ADMIN — METOCLOPRAMIDE 10 MG: 5 INJECTION, SOLUTION INTRAMUSCULAR; INTRAVENOUS at 13:55

## 2019-05-27 RX ADMIN — METOCLOPRAMIDE 10 MG: 5 INJECTION, SOLUTION INTRAMUSCULAR; INTRAVENOUS at 08:51

## 2019-05-27 RX ADMIN — LORAZEPAM 2 MG: 2 INJECTION INTRAMUSCULAR; INTRAVENOUS at 05:13

## 2019-05-27 RX ADMIN — MIRTAZAPINE 7.5 MG: 15 TABLET, ORALLY DISINTEGRATING ORAL at 20:13

## 2019-05-27 RX ADMIN — FENTANYL CITRATE 50 MCG: 50 INJECTION INTRAMUSCULAR; INTRAVENOUS at 05:13

## 2019-05-27 RX ADMIN — AMIODARONE HYDROCHLORIDE 100 MG: 100 TABLET ORAL at 08:51

## 2019-05-27 RX ADMIN — METOCLOPRAMIDE 10 MG: 5 INJECTION, SOLUTION INTRAMUSCULAR; INTRAVENOUS at 20:14

## 2019-05-27 RX ADMIN — CIPROFLOXACIN 400 MG: 2 INJECTION, SOLUTION INTRAVENOUS at 00:30

## 2019-05-27 RX ADMIN — DEXTROSE AND SODIUM CHLORIDE: 5; 450 INJECTION, SOLUTION INTRAVENOUS at 08:51

## 2019-05-27 RX ADMIN — METRONIDAZOLE 500 MG: 500 INJECTION, SOLUTION INTRAVENOUS at 13:55

## 2019-05-27 ASSESSMENT — ENCOUNTER SYMPTOMS
DIARRHEA: 0
NAUSEA: 0
CONSTIPATION: 0
ABDOMINAL PAIN: 1
VOMITING: 0
COUGH: 0
SHORTNESS OF BREATH: 0

## 2019-05-27 ASSESSMENT — PAIN SCALES - GENERAL
PAINLEVEL_OUTOF10: 2
PAINLEVEL_OUTOF10: 4
PAINLEVEL_OUTOF10: 5
PAINLEVEL_OUTOF10: 10

## 2019-05-27 ASSESSMENT — PAIN DESCRIPTION - LOCATION: LOCATION: ABDOMEN

## 2019-05-27 ASSESSMENT — PAIN DESCRIPTION - PAIN TYPE: TYPE: ACUTE PAIN

## 2019-05-27 NOTE — FLOWSHEET NOTE
Patient was busy with staff members.       05/27/19 1256   Encounter Summary   Services provided to: Patient not available   Referral/Consult From: Palliative Care   Continue Visiting   (5/27/19)   Complexity of Encounter Low   Length of Encounter 15 minutes   Routine   Type Follow up

## 2019-05-27 NOTE — FLOWSHEET NOTE
05/27/19 7801   Provider Notification   Reason for Communication Review case   Provider Name Dr. Landeros Led   Provider Notification Physician   Method of Communication Secure Message   Response Waiting for response  (notified of tube feeding residual)   Notification Time [id number]     Dr. Leti Wilder notified of patient's tube feeding residual of 60mls. Dr. Leti Wilder ordered 10 IV reglan every 6 hours and for her to start receiving D5 1/2 normal saline at 75 mls an hour. See new orders.

## 2019-05-27 NOTE — PROGRESS NOTES
[facility]    PROGRESS NOTE             5/27/2019    10:09 AM    Name:   Joss Wild  MRN:     [id number]     Acct:      [de-identified]   Room:   2096/2096-01   Day:  55  Admit Date:  4/11/2019  2:48 PM    PCP:  Ashlyn Maxwell DO  Code Status:  Full Code    Subjective:     C/C:   Chief Complaint   Patient presents with    Abdominal Pain     Interval History Status: not changed. Patient seen and examined at bedside. Patient denies acute events overnight. Patient reports continued abdominal pain, diffuse. Patient reports significant fatigue, discussed that we are increasing her thyroid replacement. Brief History:     S/p open ashley, ex lap, R colectomy w/ ileocolic anastomosis, excision small bowel diverticulum, extensive enterolysis. Intubated on vent, low pressor requirement, worsening edema, on solumedrol and albumin support. Extubated on 5/20. Edema improving, feeding transitioning to NGT.      Review of Systems:     Review of Systems   Constitutional: Negative for chills, fatigue and fever. Respiratory: Negative for cough and shortness of breath. Cardiovascular: Negative for chest pain. Gastrointestinal: Positive for abdominal pain. Negative for constipation, diarrhea, nausea and vomiting. Musculoskeletal: Negative for myalgias. Neurological: Negative for headaches. Medications: Allergies:     Allergies   Allergen Reactions    Penicillins Shortness Of Breath and Rash    Amoxicillin        Current Meds:   Scheduled Meds:    metoclopramide  10 mg Intravenous Q6H    sodium chloride  250 mL Intravenous Once    [START ON 5/28/2019] levothyroxine  75 mcg Oral Daily    metroNIDAZOLE  500 mg Intravenous Q8H    ciprofloxacin  400 mg Intravenous Q12H    amiodarone  100 mg Oral BID    mirtazapine  7.5 mg Oral Nightly    ipratropium-albuterol  1 ampule Inhalation 4x daily    fat emulsion  100 mL Intravenous Daily    insulin lispro  0-6 Units Subcutaneous Q6H    sodium chloride flush  10 mL Intravenous 2 times per day    pantoprazole  40 mg Intravenous Daily    And    sodium chloride (PF)  10 mL Intravenous Daily    alteplase  1 mg Intracatheter Once    mometasone-formoterol  2 puff Inhalation BID     Continuous Infusions:    dextrose 5 % and 0.45 % NaCl 75 mL/hr at 19 3441    PN-Adult 2-in-1 Central Line (Standard) Stopped (19 0550)    dextrose      lactated ringers 40 mL/hr at 19 0038     PRN Meds: fentanNYL **OR** fentanNYL, LORazepam, glucose, dextrose, glucagon (rDNA), dextrose, sodium phosphate IVPB **OR** sodium phosphate IVPB, sodium chloride flush, ondansetron, potassium chloride, magnesium sulfate    Data:     Past Medical History:   has a past medical history of Abnormal computed tomography of cervical spine, CVA (cerebral vascular accident) ([address] GERD (gastroesophageal reflux disease), Hypertension, Paraproteinemia, and Weight loss. Social History:   reports that she has been smoking. She has a 30.00 pack-year smoking history. She has never used smokeless tobacco. She reports that she drank about 16.8 oz of alcohol per week. She reports that she does not use drugs. Family History: History reviewed. No pertinent family history. Vitals:  /64   Pulse 96   Temp 98.3 °F (36.8 °C) (Oral)   Resp 16   Ht 5' (1.524 m)   Wt 102 lb 4.7 oz (46.4 kg)   SpO2 100%   BMI 19.98 kg/m²   Temp (24hrs), Av.5 °F (36.9 °C), Min:97.9 °F (36.6 °C), Max:99.3 °F (37.4 °C)    Recent Labs     19  1713 19  2252 19  0601 19  0658   POCGLU 107* 103 82 89       I/O(24Hr):     Intake/Output Summary (Last 24 hours) at 2019 1009  Last data filed at 2019 3912  Gross per 24 hour   Intake --   Output 1400 ml   Net -1400 ml       Labs:    Lab Results   Component Value Date    WBC 11.9 (H) 2019    HGB 6.9 (LL) 2019    HCT 22.1 (L) 05/27/2019    MCV 88.8 05/27/2019     05/27/2019     Lab Results   Component Value Date     05/27/2019    K 2.9 (LL) 05/27/2019     05/27/2019    CO2 21 05/27/2019    BUN 29 (H) 05/27/2019    CREATININE <0.40 (L) 05/27/2019    GLUCOSE 97 05/27/2019    CALCIUM 7.4 (L) 05/27/2019    PROT 4.8 (L) 05/25/2019    LABALBU 2.4 (L) 05/25/2019    BILITOT 0.42 05/25/2019    ALKPHOS 86 05/25/2019    AST 21 05/25/2019    ALT 18 05/25/2019    LABGLOM CANNOT BE CALCULATED 05/27/2019    GFRAA CANNOT BE CALCULATED 05/27/2019    GLOB NOT REPORTED 05/11/2019           Lab Results   Component Value Date/Time    SPECIAL R AC 5CC PURPLE 3CC RED 05/16/2019 12:20 PM     Lab Results   Component Value Date/Time    CULTURE NO GROWTH 6 DAYS 05/16/2019 12:20 PM       [unfilled]    Radiology:    Xr Chest (single View Frontal)    Result Date: 5/23/2019  EXAMINATION: ONE XRAY VIEW OF THE CHEST 5/23/2019 6:35 am COMPARISON: 05/20/2019 HISTORY: ORDERING SYSTEM PROVIDED HISTORY: Follow up pleural effusion TECHNOLOGIST PROVIDED HISTORY: Follow up pleural effusion Ordering Physician Provided Reason for Exam: follow up pleural effusion Acuity: Acute Type of Exam: Initial Follow-up FINDINGS: The cardiac silhouette and mediastinal contours are stable. The endotracheal tube and orogastric tube have been removed. Left-sided cardiac device and right PICC are stable. There are bilateral pleural effusions, left side greater than right, with associated bibasilar atelectasis, not appreciably changed. No new lung infiltrate. The visualized osseous structures are unremarkable. 1. Removal of the endotracheal tube and orogastric tube. 2. Stable small bilateral pleural effusions, left side greater than right, with associated bibasilar atelectasis.      Xr Chest (single View Frontal)    Result Date: 5/11/2019  EXAMINATION: ONE XRAY VIEW OF THE CHEST 5/11/2019 6:28 am COMPARISON: 10 May 2019 HISTORY: ORDERING SYSTEM PROVIDED HISTORY: study, possibly related to underlying fibrotic change or residual infiltrate. Xr Knee Left (1-2 Views)    Result Date: 5/8/2019  EXAMINATION: 2 XRAY VIEWS OF THE LEFT KNEE 5/7/2019 11:19 am COMPARISON: Left knee radiographs 03/30/2019. HISTORY: ORDERING SYSTEM PROVIDED HISTORY: fracture TECHNOLOGIST PROVIDED HISTORY: fracture Ordering Physician Provided Reason for Exam: left knee/distal femur pain Acuity: Acute Type of Exam: Initial FINDINGS: Bones are osteopenic. No suprapatellar joint effusion. There is a impacted, transverse fracture of the distal femoral metadiaphysis. Increased sclerosis along the fracture line suggests interval healing. Fracture fragments are in unchanged, near anatomic alignment. No acute dislocation. No new fracture is seen. Impacted, transverse fracture of the distal femoral metadiaphysis is in unchanged alignment and demonstrates interval healing. Xr Abdomen (kub) (single Ap View)    Result Date: 5/21/2019  EXAMINATION: ONE SUPINE XRAY VIEW(S) OF THE ABDOMEN 5/21/2019 6:52 am COMPARISON: May 10, 2018, May 9, 2018 HISTORY: ORDERING SYSTEM PROVIDED HISTORY: ileus TECHNOLOGIST PROVIDED HISTORY: ileus Ordering Physician Provided Reason for Exam: abd pain Acuity: Unknown Type of Exam: Unknown FINDINGS: Left lower lobe airspace disease and/or effusion has increased in the interval.  Dual lead pacer in place. Enteric tube terminates in the region of the stomach. Interval midline incision with skin staples. Surgical clips in the right upper quadrant are noted. A drain projects in the right pelvis. Nonobstructive bowel gas pattern. Interval increase in left lower lobe atelectasis or consolidation with effusion. Enteric tube terminates in the region of the body of the stomach. Nonobstructive bowel gas pattern.      Xr Abdomen (kub) (single Ap View)    Result Date: 5/10/2019  EXAMINATION: ONE SUPINE XRAY VIEW(S) OF THE ABDOMEN 5/10/2019 9:14 am COMPARISON: Small-bowel series 05/09/2019 HISTORY: ORDERING SYSTEM PROVIDED HISTORY: Small bowel obstruction ([address] TECHNOLOGIST PROVIDED HISTORY: TO ACCOMPANY SMALL BOWEL EXAM SMALL BOWEL OBSTRUCTION Ordering Physician Provided Reason for Exam: SMALL BOWEL FOLLOW THRU - 20 HOUR DELAYED FILM FINDINGS: Significant interval decreased amount of retained contrast in the stomach compared to last image from earlier small bowel series which likely relates to suctioning of the contrast.  Majority of previously seen contrast within the pelvis likely in the rectum is no longer visualized and has likely passed through. Residual contrast remains within the colon and small bowel. NG tube is in place with side hole in the region of the GE junction. Partially visualized mild left pleural effusion associated atelectasis. 1. Interval presumed suctioning of contrast from the stomach which now appears relatively empty. Continued progression of contrast through the small and large bowel as described. 2. NG tube side hole at the level of the GE junction, consider advancement of 2-3 cm. Xr Abdomen (kub) (single Ap View)    Result Date: 5/8/2019  EXAMINATION: SINGLE SUPINE XRAY VIEW(S) OF THE ABDOMEN 5/8/2019 8:59 am COMPARISON: CT abdomen from 05/05/2019, and KUB from 04/19/2019 HISTORY: ORDERING SYSTEM PROVIDED HISTORY: recheck obstruction TECHNOLOGIST PROVIDED HISTORY: recheck obstruction Ordering Physician Provided Reason for Exam: recheck obstruction Acuity: Unknown Type of Exam: Unknown FINDINGS: Again noted cholecystostomy tube, electrode leads from a pacemaker overlying the heart, and overlying electrode leads/tubing. NG tube no longer demonstrated. Gas-filled bowel loops without marked dilatation; cecum measures 8 cm, slightly increased as compared to the previous study. No obvious free air. No mass or organomegaly. Bones unchanged. Retrocardiac opacity, hyperinflated lungs and probable pleural effusions. NG tube removed.   Gas-filled bowel more suggestive ileus; no clear cut obstruction. Cecum measures 8 cm. Cholecystostomy tube in unchanged position. Additional unchanged findings, as above. RECOMMENDATION: Continued clinical correlation and follow-up     Ct Abdomen W Contrast Additional Contrast? Radiologist Recommendation    Result Date: 5/5/2019  EXAMINATION: CT ABDOMEN WITH CONTRAST, 5/5/2019 3:38 pm TECHNIQUE: CT of the abdomen was performed with the administration of intravenous contrast. Multiplanar reformatted images are provided for review. Dose modulation, iterative reconstruction, and/or weight based adjustment of the mA/kV was utilized to reduce the radiation dose to as low as reasonably achievable. COMPARISON: April 14, 2019 HISTORY: ORDERING SYSTEM PROVIDED HISTORY: Check for abscess/fluid collection around ashley tube site. TECHNOLOGIST PROVIDED HISTORY: Ordering Physician Provided Reason for Exam: Patient c/o abd pain around her ashley site and drainage tube. FINDINGS: Evaluation is limited by motion. Lower Chest: Moderate bilateral pleural effusions. Organs: Multiple liver hypodensities measuring up to 4 mm are incompletely characterized but in the absence of risk factors likely represent cysts requiring no specific follow-up. Cholecystostomy tube is in place. No adjacent focal drainable fluid collection. No pancreatic lesion. No splenomegaly. No adrenal lesion. No hydronephrosis. No renal lesion. GI/Bowel: Stomach is markedly distended. Swirled appearance of the mesentery is seen within the mid to lower abdomen with adjacent thickened loops of small bowel. Peritoneum/Retroperitoneum: Atherosclerotic calcification of the abdominal aorta without aneurysmal dilatation. No adenopathy. Bones/Soft Tissues: No acute soft tissue abnormality. Diffuse osteopenia. Lumbar spine degenerative changes. Bowel obstruction which is suspected to be caused by an internal hernia. Cholecystostomy tube is in place.   No surrounding fluid advanced with fluoroscopic guidance with the tip at the cavo-atrial junction. The catheter flushed easily and there was a good blood return. The catheter was secured to the skin. The patient tolerated the procedure well and there were no immediate complications. FINDINGS: Fluoroscopic image demonstrates the tip of the catheter at the cavo-atrial junction. Successful ultrasound and fluoroscopy guided PICC placement     Xr Chest Portable    Result Date: 5/20/2019  EXAMINATION: ONE XRAY VIEW OF THE CHEST 5/20/2019 6:03 am COMPARISON: 05/19/2019 HISTORY: ORDERING SYSTEM PROVIDED HISTORY: Vent TECHNOLOGIST PROVIDED HISTORY: Vent Ordering Physician Provided Reason for Exam: on vent Acuity: Acute Type of Exam: Initial FINDINGS: Endotracheal tube terminates 2.5 cm above the ron. Enteric tube courses below left hemidiaphragm. Right upper extremity PICC terminates overlying the expected location of the cavoatrial junction. Implantable cardiac device leads overlying the right atrium and ventricle. Small to moderate left and small right pleural effusions with adjacent airspace disease, likely atelectasis. Generalized osteopenia. Dense atherosclerotic calcifications of the aortic arch. Small to moderate left and small right pleural effusions with adjacent airspace disease, likely atelectasis. Support tubes and lines in grossly unchanged positioning. Xr Chest Portable    Result Date: 5/19/2019  EXAMINATION: ONE XRAY VIEW OF THE CHEST 5/19/2019 6:01 am COMPARISON: 05/18/2019 HISTORY: ORDERING SYSTEM PROVIDED HISTORY: Vent TECHNOLOGIST PROVIDED HISTORY: Vent Ordering Physician Provided Reason for Exam: Check vent placement Acuity: Unknown Type of Exam: Unknown Follow-up. FINDINGS: The cardiac silhouette and mediastinal contours are stable. Vascular calcifications are noted along the aortic arch. Support tubes and lines are unchanged. Left-sided cardiac device is stable.  There is mild pulmonary vascular congestion. There is an infiltrate in the left lung base, likely related to pleural effusion with associated atelectasis, stable. There is a tiny right pleural effusion with minimal right basilar atelectasis. No new lung infiltrate. 1. Stable pulmonary vascular congestion. 2. Small bilateral pleural effusions with associated bibasilar atelectasis, left side greater than right, unchanged. Xr Chest Portable    Result Date: 5/18/2019  EXAMINATION: ONE XRAY VIEW OF THE CHEST 5/18/2019 6:37 am COMPARISON: Chest radiograph performed 05/17/2019. HISTORY: ORDERING SYSTEM PROVIDED HISTORY: Vent TECHNOLOGIST PROVIDED HISTORY: Vent Ordering Physician Provided Reason for Exam: Post Extubation Acuity: Chronic Type of Exam: Ongoing FINDINGS: There is mild bilateral congestion. There are bibasilar effusions with the left being larger than the right. There is no pneumothorax. Heart is prominent with stable cardiac leads. The upper abdomen is unremarkable. There is a right-sided PICC line. There is endotracheal tube in stable position. There is a gastric tube in stable position. Cardiomegaly and bilateral pulmonary congestion and bibasilar effusions. Similar-appearing support tubes. Xr Chest Portable    Result Date: 5/17/2019  EXAMINATION: ONE XRAY VIEW OF THE CHEST 5/17/2019 6:59 am COMPARISON: May 16, 2019 HISTORY: ORDERING SYSTEM PROVIDED HISTORY: Vent TECHNOLOGIST PROVIDED HISTORY: Vent Ordering Physician Provided Reason for Exam: on vent Acuity: Acute Type of Exam: Initial FINDINGS: ET tube is in slightly improved position with the tip projecting 5 cm from the ron. .  NG tube beneath the diaphragm. Cardiac monitoring leads overlie the chest.  Right upper extremity PICC in good position. Implanted cardiac device is present. Calcified plaquing of the aorta. Borderline cardiomegaly. Central vascular congestion. Left-sided effusion and associated airspace disease, stable from prior. No pneumothorax. 05/13/2019 HISTORY: ORDERING SYSTEM PROVIDED HISTORY: ETT PLACEMENT TECHNOLOGIST PROVIDED HISTORY: PT IN PACU ETT PLACEMENT Recent abdominal surgery FINDINGS: Endotracheal tube tip is approximately 6 cm above the ron. Nasogastric tube tip is below the diaphragm but not included on this study. There are skin staples in the upper abdomen. Dual-chamber left subclavian pacemaker and right arm PICC line remain in place. Again shown are COPD changes with prominent interstitial markings. No significant change in left basilar airspace disease with left pleural effusion. Haziness at the right base suggests a small effusion and atelectasis. The bones are osteopenic. There appears to be a small amount of free intraperitoneal air in the midline upper abdomen related to the recent surgical procedure. Endotracheal tube tip is approximately 6 cm above the ron. Xr Chest Portable    Result Date: 5/13/2019  EXAMINATION: ONE XRAY VIEW OF THE CHEST 5/13/2019 6:25 am COMPARISON: May 11, 2019 HISTORY: ORDERING SYSTEM PROVIDED HISTORY: Follow up aspiration pneumonia TECHNOLOGIST PROVIDED HISTORY: Follow up aspiration pneumonia Ordering Physician Provided Reason for Exam: follow up aspiration pneumonia Acuity: Acute Type of Exam: Initial FINDINGS: Implanted cardiac device is present. Stable cardiomediastinal contours. Extensively calcified aorta. Right upper extremity PICC in good position. Left basilar atelectasis with airspace disease and effusion, increased from prior. No pneumothorax. COPD with prominent interstitial markings. Removal of NG tube since prior exam.     New left effusion and associated airspace disease. Differential includes atelectasis versus pneumonia. Xr Chest Portable    Result Date: 5/10/2019  EXAMINATION: ONE XRAY VIEW OF THE CHEST 5/10/2019 1:28 am COMPARISON: May 2, 2019.  HISTORY: ORDERING SYSTEM PROVIDED HISTORY: low o2 sat TECHNOLOGIST PROVIDED HISTORY: low o2 sat Ordering Physician Provided Reason for Exam: Low o2 sat Acuity: Acute Type of Exam: Initial FINDINGS: Hyperexpanded right lung volume. Left greater than right basilar heterogeneous opacities with left basilar consolidation. Small bilateral pleural effusions. Stable cardiomediastinal silhouette and great vessels. Enteric contrast in the stomach and distal esophagus. Enteric tube courses into the stomach but tip is not definitely seen due to contrast in the stomach. Stable left pectoral cardiac pacer device. Stable right PICC. Left greater than right basilar heterogeneous opacities with left basilar consolidation. Differential considerations include atelectasis and an infectious/inflammatory process. Small bilateral pleural effusions. Enteric contrast in the stomach and distal esophagus. Enteric tube courses into the stomach but tip is not definitely seen due to contrast in the stomach. Xr Chest Portable    Result Date: 4/27/2019  EXAMINATION: SINGLE XRAY VIEW OF THE CHEST 4/27/2019 8:47 am COMPARISON: 04/26/2019 HISTORY: ORDERING SYSTEM PROVIDED HISTORY: Assess for pulm edema vs PNA TECHNOLOGIST PROVIDED HISTORY: Assess for pulm edema vs PNA Ordering Physician Provided Reason for Exam: cough, congestion Acuity: Acute Type of Exam: Initial FINDINGS: Right IJ central venous catheter is in place tip in the SVC right atrial junction. Pacer wires are in place. The heart and mediastinal structures are stable. Pleural effusions are present with bibasilar atelectasis. Bilateral lung infiltrates are present in the upper lung fields. Persistent pleural effusions with bibasilar atelectasis and bilateral lung infiltrates with areas of consolidation developing in the upper lobes.      Xr Chest Portable    Result Date: 4/26/2019  EXAMINATION: SINGLE XRAY VIEW OF THE CHEST 4/26/2019 6:51 am COMPARISON: April 24, 2019 HISTORY: ORDERING SYSTEM PROVIDED HISTORY: Pleural effusions TECHNOLOGIST PROVIDED HISTORY: Pleural effusions Ordering Physician Provided Reason for Exam: pleural effusion Acuity: Acute Type of Exam: Initial FINDINGS: Right IJ line in good position. Pacer device is stable. Cardiac leads overlie the chest.  Stable cardiomediastinal contours with calcified aortic arch. Left effusion and associated airspace disease, slightly decreased. Chronic blunting of right costophrenic sulcus may represent scarring or chronic effusion. Increased reticular markings right upper chest and left upper chest possibly interstitial edema or interstitial pneumonia. No new airspace consolidation. Increasing reticular markings upper chest bilaterally right greater than left possibly due to edema or interstitial pneumonitis. Improving left effusion with associated retrocardiac airspace disease. Pleural-parenchymal scarring or effusion in the right lung base, stable. Vl Dup Lower Extremity Venous Bilateral    Result Date: 5/7/2019    [facility]  Vascular Lower Extremities DVT Study Procedure   Patient Name   [facility]     Date of Study           05/07/2019                 Hipolito Jeffries   Date of Birth  1950  Gender                  Female   Age            77 year(s)  Race                       Room Number    2123        Height:                 59.84 inch, 152 cm   Corporate ID # [id number]    Weight:                 102 pounds, 46.3 kg   Patient Acct # [de-identified]   BSA:        1.4 m^2     BMI:      20.03 kg/m^2   MR #           [id number]      Sonographer             Apoorva Kaiser ANNA   Accession #    [id number]   Interpreting Physician  Lilibeth Benítez   Referring                  Referring Physician     Tammi Corbett MD  Nurse  Practitioner  Procedure Type of Study:   Veins: Lower Extremities DVT Study, Venous Scan Lower Bilateral.  Indications for Study:Pain and swelling. Patient Status: In Patient. Technical Quality:Limited visualization. Limitation reason:patient movement.   Conclusions   Summary   No evidence of superficial or deep venous thrombosis in both lower  extremities. Technically limited visualization. Signature   ----------------------------------------------------------------  Electronically signed by Alexandria Vigil RVT(Sonographer) on  05/07/2019 01:22 PM  ----------------------------------------------------------------   ----------------------------------------------------------------  Electronically signed by Jenny Oliveira(Interpreting  physician) on 05/07/2019 06:45 PM  ----------------------------------------------------------------  Findings:   Right Impression:                    Left Impression:  The common femoral, femoral,         The common femoral, femoral,  popliteal and tibial veins           popliteal and tibial veins  demonstrate normal compressibility   demonstrate normal compressibility  and augmentation. and augmentation. Limited visualization of the calf    Limited visualization of the calf  veins. veins. Normal compressibility of the great  Normal compressibility of the great  saphenous vein. saphenous vein. Normal compressibility of the small  Normal compressibility of the small  saphenous vein. saphenous vein. Velocities are measured in cm/s ; Diameters are measured in cm Right Lower Extremities DVT Study Measurements Right 2D Measurements +------------------------------------+----------+---------------+----------+ ! Location                            ! Visualized! Compressibility! Thrombosis! +------------------------------------+----------+---------------+----------+ ! Common Femoral                      !Yes       ! Yes            ! None      ! +------------------------------------+----------+---------------+----------+ ! Prox Femoral                        !Yes       ! Yes            ! None      ! +------------------------------------+----------+---------------+----------+ ! Mid Femoral                         !Yes       ! Yes            ! None      ! +------------------------------------+----------+---------------+----------+ ! Dist Femoral                        !Yes       ! Yes            ! None      ! +------------------------------------+----------+---------------+----------+ ! Deep Femoral                        !Yes       ! Yes            ! None      ! +------------------------------------+----------+---------------+----------+ ! Popliteal                           !Yes       ! Yes            ! None      ! +------------------------------------+----------+---------------+----------+ ! Sapheno Femoral Junction            ! Yes       ! Yes            ! None      ! +------------------------------------+----------+---------------+----------+ ! PTV                                 ! Partial   !Yes            ! None      ! +------------------------------------+----------+---------------+----------+ ! Peroneal                            !Partial   !Yes            ! None      ! +------------------------------------+----------+---------------+----------+ ! Gastroc                             ! Yes       ! Yes            ! None      ! +------------------------------------+----------+---------------+----------+ ! GSV Thigh                           ! Yes       ! Yes            ! None      ! +------------------------------------+----------+---------------+----------+ ! GSV Knee                            ! Yes       ! Yes            ! None      ! +------------------------------------+----------+---------------+----------+ ! GSV Ankle                           ! Yes       ! Yes            ! None      ! +------------------------------------+----------+---------------+----------+ ! SSV                                 ! Partial   !Yes            ! None      ! +------------------------------------+----------+---------------+----------+ Left Lower Extremities DVT Study Measurements Left 2D Measurements have examined the patient myselft and taken ros and hpi , including pertinent history and exam findings,  with the resident. I have reviewed the key elements of all parts of the encounter with the resident. I agree with the assessment, plan and orders as documented by the resident. Serial protein calorie malnutrition lack of appetite poor compliance resistance and the treatment offered has delayed and increase the length of stay here patient had a laparotomy done lysis of admission cholecystectomy  anemai ?  Slow gi bleed  Patient has refused colonoscopy and EGD in the recent past  Vision got 1000 mg of Venofer IV on this admission  One unit of blood transfused yesterday  Patient is stable and transferred to [place] [id number]  Pending discharge  Start tube feed if okay with surgery    Electronically signed by Carmen Hernandez MD

## 2019-05-27 NOTE — PROGRESS NOTES
General Surgery Progress Note            PATIENT NAME: Ayse Slaughter     TODAY'S DATE: 5/27/2019, 1:30 PM    SUBJECTIVE:    Pt  Seen and examined. Had high residuals overnight. TPN stopped due to leak in bag. OBJECTIVE:   VITALS:  BP (!) 143/53   Pulse 89   Temp 98.4 °F (36.9 °C) (Oral)   Resp 16   Ht 5' (1.524 m)   Wt 102 lb 4.7 oz (46.4 kg)   SpO2 100%   BMI 19.98 kg/m²      INTAKE/OUTPUT:      Intake/Output Summary (Last 24 hours) at 5/27/2019 1330  Last data filed at 5/27/2019 1135  Gross per 24 hour   Intake 423.75 ml   Output 1270 ml   Net -846.25 ml                 CONSTITUTIONAL:  awake and alert. No acute distress  HEART:   Regular   LUNGS:   diminished  ABDOMEN:   Abdomen soft, moderately tender, non-distended. Incision intact. Drains SS.  +BS  EXTREMITIES:   1+ edema    Data:  CBC with Differential:    Lab Results   Component Value Date    WBC 11.9 05/27/2019    RBC 2.49 05/27/2019    RBC 3.66 05/23/2012    HGB 6.9 05/27/2019    HCT 22.1 05/27/2019     05/27/2019     05/23/2012    MCV 88.8 05/27/2019    MCH 27.8 05/27/2019    MCHC 31.3 05/27/2019    RDW 16.4 05/27/2019    METASPCT 1 05/25/2019    LYMPHOPCT 8 05/27/2019    MONOPCT 10 05/27/2019    MYELOPCT 2 05/25/2019    BASOPCT 1 05/27/2019    MONOSABS 1.20 05/27/2019    LYMPHSABS 1.00 05/27/2019    EOSABS 0.10 05/27/2019    BASOSABS 0.10 05/27/2019    DIFFTYPE NOT REPORTED 05/27/2019     CMP:    Lab Results   Component Value Date     05/27/2019    K 2.9 05/27/2019     05/27/2019    CO2 21 05/27/2019    BUN 29 05/27/2019    CREATININE <0.40 05/27/2019    GFRAA CANNOT BE CALCULATED 05/27/2019    LABGLOM CANNOT BE CALCULATED 05/27/2019    GLUCOSE 97 05/27/2019    GLUCOSE 87 05/21/2012    PROT 4.8 05/25/2019    LABALBU 2.4 05/25/2019    LABALBU 4.6 05/17/2012    CALCIUM 7.4 05/27/2019    BILITOT 0.42 05/25/2019    ALKPHOS 86 05/25/2019    AST 21 05/25/2019    ALT 18 05/25/2019         ASSESSMENT     Principal Problem:    Acute respiratory failure (HCC)  Active Problems:    Sepsis due to urinary tract infection (HCC)    Cystitis    Emphysematous cystitis    Septic shock (HCC)    Leukemoid reaction    Aspiration pneumonia of right lower lobe due to vomit (HCC)    MRSA carrier    Urinary retention    Abdominal distention    Elevated CEA    Elevated CA 19-9 level    Cholecystitis    CRP elevated    Elevated procalcitonin    Bandemia    Centrilobular emphysema (HCC)    Hemorrhage of rectum and anus    GI bleed    Anemia    Small bowel obstruction (HCC)    Anxiety disorder    Noncompliance    Chronic obstructive pulmonary disease, unspecified ([facility]    Bacteremia due to coagulase-negative Staphylococcus    Bradycardia  Resolved Problems:    * No resolved hospital problems. *  S/P repair for fascial dehiscence    Plan  1. Reglan started  2. 1 unit PRBC transfusion  3. Will give IV iron  4. KUB in AM  5. Restart TPN this evening  6.  Will likely restart tube feeding in AM        Kanika Gonzalez, [id number]

## 2019-05-27 NOTE — CARE COORDINATION
DISCHARGE PLANNING NOTE:    Plan is [facility]. Patient had G-tube placement and was started on tube feeds at 10. Diet started as well. Remains on TPN and Lipids. Unsure if [facility] will be able to accept this patient now that she is started on tube feeds. Will continue to follow along with LSW for discharge needs.      Electronically signed by Hugo Bustamante RN on 5/27/2019 at 11:26 AM

## 2019-05-27 NOTE — PROGRESS NOTES
no rhonchi  Cardiovascular - Heart sounds are normal.  Regular rate and rhythm   Abdomen - Soft, bowel sounds present, mild tenderness, drains in place  Neurologic - There are no focal motor or sensory deficits  Skin - No bruising or bleeding  Extremities - No clubbing, cyanosis, edema    MEDS      metoclopramide  10 mg Intravenous Q6H    sodium chloride  250 mL Intravenous Once    [START ON 5/28/2019] levothyroxine  75 mcg Oral Daily    iron sucrose  200 mg Intravenous Q24H    metroNIDAZOLE  500 mg Intravenous Q8H    ciprofloxacin  400 mg Intravenous Q12H    amiodarone  100 mg Oral BID    mirtazapine  7.5 mg Oral Nightly    ipratropium-albuterol  1 ampule Inhalation 4x daily    fat emulsion  100 mL Intravenous Daily    insulin lispro  0-6 Units Subcutaneous Q6H    sodium chloride flush  10 mL Intravenous 2 times per day    pantoprazole  40 mg Intravenous Daily    And    sodium chloride (PF)  10 mL Intravenous Daily    alteplase  1 mg Intracatheter Once    mometasone-formoterol  2 puff Inhalation BID      dextrose 5 % and 0.45 % NaCl 75 mL/hr at 05/27/19 0851    PN-Adult 2-in-1 Central Line (Standard) Stopped (05/27/19 0550)    dextrose      lactated ringers 40 mL/hr at 05/26/19 0038     fentanNYL **OR** fentanNYL, LORazepam, glucose, dextrose, glucagon (rDNA), dextrose, sodium phosphate IVPB **OR** sodium phosphate IVPB, sodium chloride flush, ondansetron, potassium chloride, magnesium sulfate    LABS   CBC   Recent Labs     05/27/19  0507   WBC 11.9*   HGB 6.9*   HCT 22.1*   MCV 88.8        BMP:   Lab Results   Component Value Date     05/27/2019    K 2.9 05/27/2019     05/27/2019    CO2 21 05/27/2019    BUN 29 05/27/2019    LABALBU 2.4 05/25/2019    LABALBU 4.6 05/17/2012    CREATININE <0.40 05/27/2019    CALCIUM 7.4 05/27/2019    GFRAA CANNOT BE CALCULATED 05/27/2019    LABGLOM CANNOT BE CALCULATED 05/27/2019     ABGs:  Lab Results   Component Value Date    PHART 7.487 05/20/2019    PO2ART 62.2 05/20/2019    HSU4NGM 48.9 05/20/2019      Lab Results   Component Value Date    MODE PRVC 05/20/2019     Ionized Calcium:  No results found for: IONCA  Magnesium:    Lab Results   Component Value Date    MG 1.7 05/27/2019     Phosphorus:    Lab Results   Component Value Date    PHOS 2.6 05/27/2019        LIVER PROFILE   Recent Labs     05/25/19  0509   AST 21   ALT 18   BILITOT 0.42   ALKPHOS 86     INR No results for input(s): INR in the last 72 hours. PTT   Lab Results   Component Value Date    APTT 31.9 05/22/2019         RADIOLOGY     (See actual reports for details)    ASSESSMENT/PLAN   Principal Problem:    Acute respiratory failure ([address]  Active Problems:    Sepsis due to urinary tract infection (HCC)    Cystitis    Emphysematous cystitis    Septic shock (HCC)    Leukemoid reaction    Aspiration pneumonia of right lower lobe due to vomit (HCC)    MRSA carrier    Urinary retention    Abdominal distention    Elevated CEA    Elevated CA 19-9 level    Cholecystitis    CRP elevated    Elevated procalcitonin    Bandemia    Centrilobular emphysema (HCC)    Hemorrhage of rectum and anus    GI bleed    Anemia    Small bowel obstruction (HCC)    Anxiety disorder    Noncompliance    Chronic obstructive pulmonary disease, unspecified ([address]    Bacteremia due to coagulase-negative Staphylococcus    Bradycardia  Resolved Problems:    * No resolved hospital problems.  *    Plan:  RBC transfusion  Continue present treatment  Aerosol treatments  O2    Electronically signed by Judy Bauer MD on 5/27/2019 at 10:56 AM

## 2019-05-27 NOTE — PROGRESS NOTES
Nutrition Assessment (Parenteral Nutrition)    Type and Reason for Visit: Reassess    Nutrition Recommendations:   1. Continue Dental Soft diet as tolerated. 2. Continue Calorie count. 3. Tube feeding on hold until approved by physician to restart. 4. Resume TPN at 65 mL/hr and 100 mL of lipids. Following changes made to additives: K+ acetate: 15 to 30 mEq, K+ phosphate 0 to 20 mmol, Mg sulfate: 9 to 12 mEq, add MVI and trace elements. Nutrition Assessment: Patient not improving from a nutritional standpoint due to not receiving new bag of TPN and lipids related to leaking TPN bag (it was discarded). Tube feeding overnight was not tolerated with higher than expected residules for 10 mL/hr. Tube feeding turned off this morning. Patient did not eat any breakfast. Will resume TPN and lipids tonight. Note that Reglan started. Will monitor p.o intakes and calorie count. Will monitor nutrition progression. Malnutrition Assessment:  · Malnutrition Status: At risk for malnutrition  · Context: Acute illness or injury  · Findings of the 6 clinical characteristics of malnutrition (Minimum of 2 out of 6 clinical characteristics is required to make the diagnosis of moderate or severe Protein Calorie Malnutrition based on AND/ASPEN Guidelines):  1. Energy Intake-Greater than 75% of estimated energy requirement, (Variable at times; over 75% of needs met for majority of admission)    2. Weight Loss-Unable to assess(edema present),    3. Fat Loss-Unable to assess(Pt is thin; edema present; no known recent loss of muscle or fat),    4. Muscle Loss-Unable to assess,    5. Fluid Accumulation-Moderate to severe fluid accumulation, Extremities, Generalized  6.   Strength-Not measured    Nutrition Risk Level: High    Nutrient Needs:  · Estimated Daily Total Kcal: 3593-5011 based on 35-39 kcal per kg of usual body wt  · Estimated Daily Protein (g): 63-68 based on 1.4-1.5 gm/kg IBW(revised)    Nutrition Diagnosis: · Problem: Inadequate oral intake  · Etiology: related to Insufficient energy/nutrient consumption, Alteration in GI function     Signs and symptoms:  as evidenced by NPO status due to medical condition, Presence of wounds, Nutrition support - PN    Objective Information:  · Nutrition-Focused Physical Findings: +1 pitting general edema, +1 RUE, +3 pitting LUE, +1 pitting BLE edema  · Wound Type: Deep Tissue Injury, Multiple, Pressure Ulcer, Stage I, Stage II, Surgical Wound  · Current Nutrition Therapies:  · Oral Diet Orders: Dental Soft   · Oral Diet intake: 0%  · Oral Nutrition Supplement (ONS) Orders: None  · ONS intake: NPO  · Parenteral Nutrition Orders:  · Type and Formula: Premix Central, 2-in-1 Custom   · Lipids: Daily, 100ml  · Rate/Volume: 65/1560 ml daily  · Duration: Continuous  · Current PN Order Provides: 1573 kcal, 78 gm pro  · Goal PN Orders Provides:  Total nutrition support goal: 5620-0224 kcal, 63-68 gm protein  · Additional Calories: None   Tube Feeding (TF) Orders:   Feeding Route: Gastrostomy  Formula: Semi-elemental (on hold)  Rate (ml/hr):10 mL/hr    Volume (ml/day): 240 mL  Duration: Continuous  Current TF & Flush Orders Provides: 288 kcal and 14 gm of protein   · Anthropometric Measures:  · Ht: 5' (152.4 cm)   · Current Body Wt: 102 lb (46.3 kg)  · Admission Body Wt: 102 lb (46.3 kg)  · Usual Body Wt: 89 lb 1.1 oz (40.4 kg)(3-30-19)  · Ideal Body Wt: 100 lb (45.4 kg), % Ideal Body 89% (based on wt 5-12)  · BMI Classification: BMI 18.5 - 24.9 Normal Weight    Nutrition Interventions:   Continue current diet, Modify current Parenteral Nutrition, Continue Calorie Count  Continued Inpatient Monitoring    Nutrition Evaluation:   · Evaluation: Progressing toward goals   · Goals: Adequate nutritional intake or provision    · Monitoring: Nutrition Progression, Meal Intake, Weight, Pertinent Labs, PN Intake, Diet Tolerance, PN Tolerance, Skin Integrity, Wound Healing, I&O, Monitor Bowel Function      Eugene DAVIDSON RJACQUELINE, L.D,  Clinical Dietitian  Cell # [phone]  Office # [phone]

## 2019-05-27 NOTE — FLOWSHEET NOTE
05/27/19 0600   Provider Notification   Reason for Communication Review case   Provider Name Dr. Vonnie Kaur   Provider Notification Physician   Method of Communication Secure Message   Response Waiting for response   Notification Time 0600     Dr. Vonnie Kaur updated on patient's TPN bag leaking and needing to be discarded. Pharmacy was called for a new bag and they stated they are unable to make TPN at this time due to there being only one pharmacist at night and the pharmacist wasn't sure if they would be able to make a new bag this morning when the next pharmacist came in. Dr. Vonnie Kaur was also relayed this information. No further orders at this time.

## 2019-05-27 NOTE — PROGRESS NOTES
Infectious disease Consult Note      Patient: Yoselin Ayala  : 1950  Acct#:  [id number]     Date:  2019    Assessment:   Leukocytosis improving . Aspiration pneumonia treated  . Shock resolved . Cholecystitis status post cholecystectomy tube  grew Candida non-albicans and one colony of ESBL Klebsiella on culture . SBO S/p right colectomy with ileocolic anastomosis,open cholecystectomy and excision of small bowel diverticulum 5/15 . Single positive blood culture on 5/10 STAPHYLOCOCCUS SPECIES, COAGULASE NEGATIVE likely contamination     Ecoli Emphysematous cystitis initially treatedtreated     Aspiration pneumonia of right lower lobe initially     Yeast growth on sputum culture suspect contamination     MRSA carrier    Urinary retention     Anemia GI bleed followed by GI     PCN allergy      History of CVA with left hemiparesis              Recommendations:   Discontinue Cipro and Flagyl  Follow CBC and renal function  . Continue supportive care . Subjective:       History of Present Illness  Patient is a 77 y.o.  female admitted with Sepsis due to urinary tract infection   who is seen in consult for the same . She presented w dysuria and lower abd pain for around a week PRIOR TO ADMISSION associated with vomiting ,was found hypotensive with leukocytosis . CT suggested emphysematous cystitis,possible gastric outlet obstruction. CXR showed a right lower infiltrate. High CA-19-9,CEA  Allergy to [facility] w SOB   Urine culture  grew ESCHERICHIA COLI resistant to Ampicillin ,sensitive to all other tested ABXS . Blood cultures negative . Mycoplasma IGM 0.97   YEAST MODERATE GROWTH on sputum culture   S/P EGD   with reported esophagitis. GB US Findings suggesting acute cholecystitis. HIIDA showed absent gallbladder filling.    She was extubated on   Status post cholecystectomy tube  by interventional radiology,  cultures on  grew Candida non-albicans and facility-administered medications on file prior to encounter. Current Outpatient Medications on File Prior to Encounter   Medication Sig Dispense Refill    oxyCODONE-acetaminophen (PERCOCET) 5-325 MG per tablet Take 1 tablet by mouth every 6 hours as needed for Pain.  senna-docusate (PERICOLACE) 8.6-50 MG per tablet Take 1 tablet by mouth 2 times daily      budesonide-formoterol (SYMBICORT) 160-4.5 MCG/ACT AERO Inhale 2 puffs into the lungs 2 times daily      sucralfate (CARAFATE) 1 GM/10ML suspension Take 1 g by mouth 4 times daily       traZODone (DESYREL) 50 MG tablet Take 50 mg by mouth nightly      tiZANidine (ZANAFLEX) 2 MG tablet Take 2 mg by mouth every 8 hours as needed (left knee)       aspirin 81 MG tablet Take 81 mg by mouth daily      clopidogrel (PLAVIX) 75 MG tablet TAKE 1 TABLET DAILY 30 tablet 2    DOCQLACE 100 MG capsule TAKE 1 CAPSULE BY MOUTH IN THE MORNING & IN THE EVENING -DRINK PLENTYOF WATER WHILE TAKING THIS MEDICINE 30 capsule 1    amLODIPine (NORVASC) 10 MG tablet Take 10 mg by mouth daily.  LORazepam (ATIVAN) 0.5 MG tablet Take 0.5 mg by mouth every 6 hours as needed for Anxiety.  therapeutic multivitamin-minerals (THERAGRAN-M) tablet Take 1 tablet by mouth daily.  omeprazole (PRILOSEC) 20 MG capsule Take 20 mg by mouth daily.  venlafaxine (EFFEXOR) 37.5 MG tablet Take 37.5 mg by mouth 3 times daily.  Misc. Devices [facility] [id number] [facility] wheelchair with left fupper extremity support  Dx: stroke with left hemiparesis.  1 each 0           Allergies  Allergies   Allergen Reactions    Penicillins Shortness Of Breath and Rash    Amoxicillin         Social   Social History     Tobacco Use    Smoking status: Current Some Day Smoker     Packs/day: 1.00     Years: 30.00     Pack years: 30.00    Smokeless tobacco: Never Used   Substance Use Topics    Alcohol use: Not Currently     Alcohol/week: 16.8 oz     Types: 28 Glasses of wine per week History reviewed. No pertinent family history. Review of Systems  Other than above 10 systems reviewed negative    Tolerating antibiotics. Physical Exam  BP (!) 143/53   Pulse 89   Temp 98.4 °F (36.9 °C) (Oral)   Resp 16   Ht 5' (1.524 m)   Wt 102 lb 4.7 oz (46.4 kg)   SpO2 100%   BMI 19.98 kg/m²           General Appearance: Awake, oriented ,not under distress  . Skin: warm and dry, no rash or erythema  Head: normocephalic and atraumatic  Eyes: pupils equal, round  Neck: neck supple and non tender   Pulmonary/Chest: coarse to auscultation bilaterally- with  rhonchi  Cardiovascular: normal rate, regular rhythm, normal S1 and S2, no murmurs. Abdomen: soft,surgical INCISION INTACT ,NO ERYTHEMA  ,MORGAN drain serous   Extremities: no cyanosis, clubbing   Edema   PICC line in place       Data Review:    Recent Labs     05/25/19  0509 05/26/19  0526 05/27/19  0507   WBC 13.0* 18.0* 11.9*   HGB 9.2* 7.3* 6.9*   HCT 28.4* 22.9* 22.1*   MCV 87.7 87.7 88.8    226 176     Recent Labs     05/25/19  0509 05/26/19  0526 05/27/19  0507   * 136 137   K 4.2 3.9 2.9*    108* 106   CO2 21 18* 21   PHOS  --   --  2.6   BUN 37* 36* 29*   CREATININE <0.40* <0.40* <0.40*     Recent Labs     05/25/19  0509   AST 21   ALT 18   BILITOT 0.42   ALKPHOS 86     No results for input(s): LIPASE, AMYLASE in the last 72 hours. No results for input(s): PROTIME, INR in the last 72 hours. Imaging Studies:                           All appropriate imaging studies and reports reviewed: Yes       Ct Abdomen Pelvis Wo Contrast Additional Contrast? Oral    Result Date: 4/14/2019  EXAMINATION: CT OF THE ABDOMEN AND PELVIS WITHOUT CONTRAST 4/14/2019 7:34 pm TECHNIQUE: CT of the abdomen and pelvis was performed without the administration of intravenous contrast. Multiplanar reformatted images are provided for review.  Dose modulation, iterative reconstruction, and/or weight based adjustment of the mA/kV was utilized to reduce the radiation dose to as low as reasonably achievable. COMPARISON: 04/11/2019 HISTORY: ORDERING SYSTEM PROVIDED HISTORY: ABDOMINAL PAIN TECHNOLOGIST PROVIDED HISTORY: Water soluble contrast only please Ordering Physician Provided Reason for Exam: Abdominal pain - Vented patient. Contrast given via nurse through NG tube. Acuity: Unknown Type of Exam: Unknown Relevant Medical/Surgical History: Hx - Sepsis due to urinary tract infection. FINDINGS: Lower Chest: New moderate layering bilateral pleural effusions with bilateral lower lobe atelectasis. Organs: Limited evaluation due lack of intravenous contrast.  Cholelithiasis redemonstrated. No gallbladder wall thickening or biliary ductal dilatation. Scattered tiny hypodense lesions in the liver are too small to characterize but statistically represent benign cysts or hemangiomas and appear unchanged. The pancreas, spleen, adrenal glands, and kidneys are unremarkable. There is no hydronephrosis or urinary tract calculus. GI/Bowel: The stomach is distended. Enteric tube is in place. No contrast is seen distal to the pylorus and there is contrast reflux into the distal esophagus. There is no evidence of bowel obstruction. The appendix is not definitely visualized. No focal pericecal inflammatory changes are evident. Pelvis: The urinary bladder is decompressed by Tran catheter. No pelvic mass is seen. Peritoneum/Retroperitoneum: Small amount of free fluid in the pelvic cavity. No free air or focal fluid collection. No abnormal lymph node. Normal abdominal aortic caliber. Moderate calcific atherosclerosis. Bones/Soft Tissues: No acute osseous abnormality. Diffuse anasarca. Moderate degenerative changes in the lumbar spine. 1. Distended, contrast filled stomach with reflux of contrast into the esophagus. No enteric contrast is seen distal to the pylorus suggesting delayed gastric emptying in the setting of ileus.  2. New moderate layering effusion. Joint spaces are not optimally profiled but no significant arthritic changes are apparent. Left tib-fib, three views: There is evidence of a remote healed distal tibia fracture. No acute fracture or dislocation is seen. No focal soft tissue abnormality. No acute osseous abnormality of the left femur. No acute osseous abnormality of the left knee. No acute osseous abnormality of the left tib-fib. Healed remote distal tibia fracture. Marked osteopenia, likely due to disuse. Xr Knee Left (1-2 Views)    Result Date: 3/27/2019  EXAMINATION: 4 XRAY VIEWS OF THE LEFT FEMUR; 2 XRAY VIEWS OF THE LEFT KNEE; 3 XRAY VIEWS OF THE LEFT TIBIA AND FIBULA 3/27/2019 7:00 pm COMPARISON: Left hip 11/14/2015. HISTORY: ORDERING SYSTEM PROVIDED HISTORY: pain TECHNOLOGIST PROVIDED HISTORY: pain Ordering Physician Provided Reason for Exam: pt twisted wrong, lt knee pain, unable to straighten knee. Acuity: Acute Type of Exam: Initial FINDINGS: Left femur, four views: The bones are diffusely osteopenic. No acute fracture deformity. The hip joint is maintained. The femoral head projects within the acetabulum. No focal soft tissue abnormality is seen. Left knee, two views: The knee is flexed. No acute osseous abnormality. No joint effusion. Joint spaces are not optimally profiled but no significant arthritic changes are apparent. Left tib-fib, three views: There is evidence of a remote healed distal tibia fracture. No acute fracture or dislocation is seen. No focal soft tissue abnormality. No acute osseous abnormality of the left femur. No acute osseous abnormality of the left knee. No acute osseous abnormality of the left tib-fib. Healed remote distal tibia fracture. Marked osteopenia, likely due to disuse.      Xr Knee Left (3 Views)    Result Date: 3/30/2019  EXAMINATION: 3 XRAY VIEWS OF THE LEFT KNEE 3/30/2019 10:58 am COMPARISON: March 27, 2018 HISTORY: ORDERING SYSTEM PROVIDED HISTORY: F/u L knee pain after cast TECHNOLOGIST PROVIDED HISTORY: AP, oblique, lateral F/u L knee pain after cast FINDINGS: Marked osteopenia. Interval casting, which degrades fine osseous detail question cortical offset in the lateral femoral metaphysis. No malalignment identified. No significant joint effusion. Interval casting. Question nondisplaced fracture in the lateral femoral metaphysis. Osteopenia. Xr Tibia Fibula Left (2 Views)    Result Date: 3/27/2019  EXAMINATION: 4 XRAY VIEWS OF THE LEFT FEMUR; 2 XRAY VIEWS OF THE LEFT KNEE; 3 XRAY VIEWS OF THE LEFT TIBIA AND FIBULA 3/27/2019 7:00 pm COMPARISON: Left hip 11/14/2015. HISTORY: ORDERING SYSTEM PROVIDED HISTORY: pain TECHNOLOGIST PROVIDED HISTORY: pain Ordering Physician Provided Reason for Exam: pt twisted wrong, lt knee pain, unable to straighten knee. Acuity: Acute Type of Exam: Initial FINDINGS: Left femur, four views: The bones are diffusely osteopenic. No acute fracture deformity. The hip joint is maintained. The femoral head projects within the acetabulum. No focal soft tissue abnormality is seen. Left knee, two views: The knee is flexed. No acute osseous abnormality. No joint effusion. Joint spaces are not optimally profiled but no significant arthritic changes are apparent. Left tib-fib, three views: There is evidence of a remote healed distal tibia fracture. No acute fracture or dislocation is seen. No focal soft tissue abnormality. No acute osseous abnormality of the left femur. No acute osseous abnormality of the left knee. No acute osseous abnormality of the left tib-fib. Healed remote distal tibia fracture. Marked osteopenia, likely due to disuse.      Xr Abdomen (kub) (single Ap View)    Result Date: 4/14/2019  EXAMINATION: SINGLE SUPINE XRAY VIEW(S) OF THE ABDOMEN 4/14/2019 7:39 am COMPARISON: CT abdomen and pelvis  film from 11 April 2019 HISTORY: [address] [facility] [address]: Abd Distention TECHNOLOGIST PROVIDED HISTORY: Abd Distention Ordering Physician Provided Reason for Exam: Abdominal distention. Pt was moving around in the bed. Best films at present time. Acuity: Acute Type of Exam: Initial Additional signs and symptoms: Abdominal distention. Pt was moving around in the bed. Best films at present time. FINDINGS: Portable view time stamped at 748 hours demonstrates an intestinal tube terminating in the midportion of a gaseous Spike dilated stomach. Densities are present over the stomach likely medication. Bipolar pacemaker is in situ with intact leads. Heart size is top-normal, stable. Gaseous distension of the stomach and loop of bowel in the upper mid abdomen is noted but there is gas and fecal material in the rectum. Gastric outlet obstruction or proximal small bowel partial obstruction is suspected. No free air is noted. Midline city is present over the pelvis likely a monitor or CT small bore catheter. Vascular calcification is present in the pelvis. Persistent preferential gaseous distension of the stomach although there is some gas in the upper abdomen and gas and fecal material present in the rectosigmoid. Findings suggest gastric outlet obstruction. Ct Abdomen Pelvis W Iv Contrast    Result Date: 4/11/2019  EXAMINATION: CT OF THE ABDOMEN AND PELVIS WITH CONTRAST 4/11/2019 5:14 pm TECHNIQUE: CT of the abdomen and pelvis was performed with the administration of intravenous contrast. Multiplanar reformatted images are provided for review. Dose modulation, iterative reconstruction, and/or weight based adjustment of the mA/kV was utilized to reduce the radiation dose to as low as reasonably achievable. COMPARISON: None. HISTORY: ORDERING SYSTEM PROVIDED HISTORY: Abdominal pain TECHNOLOGIST PROVIDED HISTORY: IV Only Contrast Ordering Physician Provided Reason for Exam: patient c/o abd pain for an hour FINDINGS: Lower Chest: Trace pleural fluid bilaterally is noted. Indwelling cardiac pacemaker is present.   Heart size is normal.  Coronary artery calcifications are evident. The esophagus is significantly dilated and fluid-filled. No paraesophageal adenopathy is evident. Organs: The spleen, pancreas, and adrenals are unremarkable. The liver contains hypodensities, likely cysts. The gallbladder is distended and contains a solitary gallstone. The kidneys excrete contrast bilaterally. Extrarenal collecting systems are noted. The ureters are mildly dilated down to the urinary bladder without evidence of intraluminal or ureteral obstructing calculi. GI/Bowel: Marked distention of the stomach is noted; this continues to the pylorus with appearance suggesting partial gastric outlet obstruction. Fluid is present in some small bowel loops and colon distal to the stomach. No small bowel obstruction. Pelvis: Marked distention of the urinary bladder is noted. 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 Distended ureters may be related to reflux or infection. No free pelvic fluid, pelvic or inguinal adenopathy is noted. Peritoneum/Retroperitoneum: No aortic aneurysm. Mild to moderate plaque is noted in the infrarenal abdominal aorta and both proximal iliac arteries. Shotty lymph nodes are present around the aorta in the upper abdomen. No mesenteric adenopathy is noted. Bones/Soft Tissues: Degenerative changes are present in the hips and lower lumbar facets. Estimated biologic radiation dose for this procedure:258.77 mGy/cm2.     1. Dilatation of the thoracic esophagus filled with fluid. No obstructive process is noted. No adjacent enlarged lymph nodes. 2. Trace pleural fluid. 3. Marked distention of the stomach. This appears to extend to the pylorus. Partial gastric outlet obstruction is not excluded. 4. Bilateral dilated ureters without obstructing calculi. Urinary bladder is markedly distended with bladder wall air suggesting emphysematous cystitis.  Dilatation of the ureters may be related to reflux or infection. 5. Atherosclerotic disease. 6. Other findings as above. Critical results were called by Dr. Leonela Wisdom MD to [address] on 4/11/2019 at 17:37. Xr Chest Portable    Result Date: 4/16/2019  EXAMINATION: SINGLE XRAY VIEW OF THE CHEST 4/16/2019 6:54 am COMPARISON: 04/15/2019, 610 hours HISTORY: ORDERING SYSTEM PROVIDED HISTORY: ETT placement TECHNOLOGIST PROVIDED HISTORY: ETT placement Ordering Physician Provided Reason for Exam: on vent Acuity: Acute Type of Exam: Initial 64-year-old female on ventilator; check endotracheal tube placement FINDINGS: Portable AP upright view of the chest. Endotracheal tube distal tip overlying the mid trachea approximately 4.1 cm above the level of the ron. Enteric tube traverses the GE junction with distal tip excluded from the field of view. Left subclavian approach cardiac pacemaker device distal lead tips relatively stable in position. Right internal jugular approach central venous catheter distal tip overlying the high right atrium, stable. Cardiac monitor leads overlie the chest. Atherosclerotic calcification of the thoracic aorta. Slight stable volume loss of the left hemithorax. No pneumothorax. No free air. Dense retrocardiac/left basilar airspace consolidation and small left-sided pleural effusion. Stable mild focal opacity at the right mid lung zone. Underlying COPD. Stable mild pulmonary vascular congestion and left-sided predominant parahilar opacity. Visualized osseous structures remain unchanged. 1. Stable multifocal airspace disease as detailed above with dense retrocardiac/left basilar airspace consolidation and small left-sided pleural effusion. Mild pulmonary vascular congestion. Findings may represent edema or multifocal pneumonia. 2. Underlying COPD. 3. Tubes and line as detailed above.      Xr Chest Portable    Result Date: 4/15/2019  EXAMINATION: SINGLE XRAY VIEW OF THE CHEST 4/15/2019 6:47 am COMPARISON: 14 April 2019 HISTORY: ORDERING SYSTEM PROVIDED HISTORY: ETT placement TECHNOLOGIST PROVIDED HISTORY: ETT placement Ordering Physician Provided Reason for Exam: on vent Acuity: Acute Type of Exam: Initial FINDINGS: AP portable view of the chest time stamped at 612 hours demonstrates overlying cardiac monitoring electrodes. Endotracheal tube terminates 4 cm above the ron. Bipolar pacemaker enters from the left with intact leads in appropriate positions. Intestinal tube extends beyond the fundus of the stomach, tip not included. Right internal jugular catheter terminates at the cavoatrial junction. Heart size is normal.  Aortic arch is calcified. There is interval improvement in vascular congestion with resolution of perihilar opacities. Some bibasilar opacities remain. No extrapleural air is noted. Osseous structures are stable. Interval improvement in vascular congestion and bilateral opacities consistent with resolving pulmonary edema. Tubes and lines as above. Xr Chest Portable    Result Date: 4/14/2019  EXAMINATION: SINGLE XRAY VIEW OF THE CHEST 4/14/2019 7:57 am COMPARISON: Portable chest 04/13/2019. HISTORY: ORDERING SYSTEM PROVIDED HISTORY: Intubation TECHNOLOGIST PROVIDED HISTORY: Intubation Ordering Physician Provided Reason for Exam: intubation Acuity: Acute Type of Exam: Initial Additional signs and symptoms: intubation FINDINGS: Endotracheal tube terminates over the midthoracic trachea. Dual-chamber pacemaker leads appear unchanged in position. Right IJ approach central venous catheter unchanged in position. Heart size not substantially changed. Perihilar and basilar opacities further increased. Left pleural effusion increased in size. Findings may reflect pulmonary edema, progressed from yesterday's exam.  Left pleural effusion increased in size.      Xr Chest Portable    Result Date: 4/12/2019  EXAMINATION: SINGLE XRAY VIEW OF THE CHEST 4/12/2019 5:26 am COMPARISON: November 14, 2015. HISTORY: ORDERING SYSTEM PROVIDED HISTORY: line placement TECHNOLOGIST PROVIDED HISTORY: line placement Ordering Physician Provided Reason for Exam: New right side line placement. Acuity: Acute Type of Exam: Initial Additional signs and symptoms: New right side line placement. FINDINGS: Stable left pectoral trans venous cardiac pacer device. New right IJ central venous catheter with tip near the superior atrial caval junction. Normal lung volume. No new consolidation. Curvilinear radiopacity projecting over the right upper lobe likely represents artifact from a skin fold. No pleural effusion or pneumothorax. Stable cardiomediastinal silhouette and great vessels with redemonstration of atherosclerotic thoracic aorta. New right IJ central venous catheter with tip near the superior atrial caval junction. No pneumothorax. No new consolidation. Thank you for allowing me to participate in the care of your patient. Please feel free to contact me with any questions or concerns.      Cherelle Wynne MD

## 2019-05-27 NOTE — FLOWSHEET NOTE
Pt resting quietly. Barrier cream applied to buttock and pt repositioned. Left arm edematous and elevated on pillow. Pt relates at present that she is comfortable.

## 2019-05-28 ENCOUNTER — APPOINTMENT (OUTPATIENT)
Dept: GENERAL RADIOLOGY | Age: 69
DRG: 853 | End: 2019-05-28
Payer: COMMERCIAL

## 2019-05-28 LAB
-: ABNORMAL
ABO/RH: NORMAL
ABSOLUTE EOS #: 0.1 K/UL (ref 0–0.4)
ABSOLUTE IMMATURE GRANULOCYTE: ABNORMAL K/UL (ref 0–0.3)
ABSOLUTE LYMPH #: 1.2 K/UL (ref 1–4.8)
ABSOLUTE MONO #: 1 K/UL (ref 0.1–1.3)
AMORPHOUS: ABNORMAL
ANION GAP SERPL CALCULATED.3IONS-SCNC: 7 MMOL/L (ref 9–17)
ANTIBODY SCREEN: NEGATIVE
ARM BAND NUMBER: NORMAL
BACTERIA: ABNORMAL
BASOPHILS # BLD: 0 % (ref 0–2)
BASOPHILS ABSOLUTE: 0 K/UL (ref 0–0.2)
BILIRUBIN URINE: NEGATIVE
BLD PROD TYP BPU: NORMAL
BUN BLDV-MCNC: 14 MG/DL (ref 8–23)
BUN/CREAT BLD: ABNORMAL (ref 9–20)
CALCIUM SERPL-MCNC: 7.4 MG/DL (ref 8.6–10.4)
CASTS UA: ABNORMAL /LPF
CHLORIDE BLD-SCNC: 104 MMOL/L (ref 98–107)
CO2: 24 MMOL/L (ref 20–31)
COLOR: YELLOW
COMMENT UA: ABNORMAL
CREAT SERPL-MCNC: <0.4 MG/DL (ref 0.5–0.9)
CROSSMATCH RESULT: NORMAL
CRYSTALS, UA: ABNORMAL /HPF
DIFFERENTIAL TYPE: ABNORMAL
DISPENSE STATUS BLOOD BANK: NORMAL
EOSINOPHILS RELATIVE PERCENT: 1 % (ref 0–4)
EPITHELIAL CELLS UA: ABNORMAL /HPF
EXPIRATION DATE: NORMAL
GFR AFRICAN AMERICAN: ABNORMAL ML/MIN
GFR NON-AFRICAN AMERICAN: ABNORMAL ML/MIN
GFR SERPL CREATININE-BSD FRML MDRD: ABNORMAL ML/MIN/{1.73_M2}
GFR SERPL CREATININE-BSD FRML MDRD: ABNORMAL ML/MIN/{1.73_M2}
GLUCOSE BLD-MCNC: 106 MG/DL (ref 65–105)
GLUCOSE BLD-MCNC: 106 MG/DL (ref 65–105)
GLUCOSE BLD-MCNC: 121 MG/DL (ref 65–105)
GLUCOSE BLD-MCNC: 123 MG/DL (ref 65–105)
GLUCOSE BLD-MCNC: 129 MG/DL (ref 65–105)
GLUCOSE BLD-MCNC: 131 MG/DL (ref 70–99)
GLUCOSE URINE: ABNORMAL
HCT VFR BLD CALC: 25.5 % (ref 36–46)
HCT VFR BLD CALC: 26.5 % (ref 36–46)
HEMOGLOBIN: 8.5 G/DL (ref 12–16)
HEMOGLOBIN: 8.7 G/DL (ref 12–16)
IMMATURE GRANULOCYTES: ABNORMAL %
KETONES, URINE: NEGATIVE
LEUKOCYTE ESTERASE, URINE: NEGATIVE
LYMPHOCYTES # BLD: 10 % (ref 24–44)
MAGNESIUM: 1.6 MG/DL (ref 1.6–2.6)
MCH RBC QN AUTO: 28.7 PG (ref 26–34)
MCHC RBC AUTO-ENTMCNC: 33 G/DL (ref 31–37)
MCV RBC AUTO: 87.2 FL (ref 80–100)
MONOCYTES # BLD: 9 % (ref 1–7)
MUCUS: ABNORMAL
NITRITE, URINE: NEGATIVE
NRBC AUTOMATED: ABNORMAL PER 100 WBC
OTHER OBSERVATIONS UA: ABNORMAL
PDW BLD-RTO: 15.7 % (ref 11.5–14.9)
PH UA: 5.5 (ref 5–8)
PHOSPHORUS: 2.3 MG/DL (ref 2.6–4.5)
PLATELET # BLD: 318 K/UL (ref 150–450)
PLATELET ESTIMATE: ABNORMAL
PMV BLD AUTO: 8.4 FL (ref 6–12)
POTASSIUM SERPL-SCNC: 3.7 MMOL/L (ref 3.7–5.3)
PREALBUMIN: 11.8 MG/DL (ref 20–40)
PROTEIN UA: ABNORMAL
RBC # BLD: 3.03 M/UL (ref 4–5.2)
RBC # BLD: ABNORMAL 10*6/UL
RBC UA: ABNORMAL /HPF
RENAL EPITHELIAL, UA: ABNORMAL /HPF
SEG NEUTROPHILS: 80 % (ref 36–66)
SEGMENTED NEUTROPHILS ABSOLUTE COUNT: 9.2 K/UL (ref 1.3–9.1)
SODIUM BLD-SCNC: 135 MMOL/L (ref 135–144)
SPECIFIC GRAVITY UA: 1.01 (ref 1–1.03)
TRANSFUSION STATUS: NORMAL
TRICHOMONAS: ABNORMAL
TURBIDITY: CLEAR
UNIT DIVISION: 0
UNIT NUMBER: NORMAL
URINE HGB: NEGATIVE
UROBILINOGEN, URINE: NORMAL
WBC # BLD: 11.5 K/UL (ref 3.5–11)
WBC # BLD: ABNORMAL 10*3/UL
WBC UA: ABNORMAL /HPF
YEAST: ABNORMAL

## 2019-05-28 PROCEDURE — 80048 BASIC METABOLIC PNL TOTAL CA: CPT

## 2019-05-28 PROCEDURE — 6370000000 HC RX 637 (ALT 250 FOR IP): Performed by: SURGERY

## 2019-05-28 PROCEDURE — 84100 ASSAY OF PHOSPHORUS: CPT

## 2019-05-28 PROCEDURE — 85025 COMPLETE CBC W/AUTO DIFF WBC: CPT

## 2019-05-28 PROCEDURE — 94761 N-INVAS EAR/PLS OXIMETRY MLT: CPT

## 2019-05-28 PROCEDURE — C9113 INJ PANTOPRAZOLE SODIUM, VIA: HCPCS | Performed by: SURGERY

## 2019-05-28 PROCEDURE — 82947 ASSAY GLUCOSE BLOOD QUANT: CPT

## 2019-05-28 PROCEDURE — 36592 COLLECT BLOOD FROM PICC: CPT

## 2019-05-28 PROCEDURE — 6360000002 HC RX W HCPCS: Performed by: INTERNAL MEDICINE

## 2019-05-28 PROCEDURE — 85014 HEMATOCRIT: CPT

## 2019-05-28 PROCEDURE — 2580000003 HC RX 258: Performed by: SURGERY

## 2019-05-28 PROCEDURE — 2700000000 HC OXYGEN THERAPY PER DAY

## 2019-05-28 PROCEDURE — 2500000003 HC RX 250 WO HCPCS: Performed by: SURGERY

## 2019-05-28 PROCEDURE — 1200000000 HC SEMI PRIVATE

## 2019-05-28 PROCEDURE — 83735 ASSAY OF MAGNESIUM: CPT

## 2019-05-28 PROCEDURE — 6360000002 HC RX W HCPCS: Performed by: STUDENT IN AN ORGANIZED HEALTH CARE EDUCATION/TRAINING PROGRAM

## 2019-05-28 PROCEDURE — 84134 ASSAY OF PREALBUMIN: CPT

## 2019-05-28 PROCEDURE — 74018 RADEX ABDOMEN 1 VIEW: CPT

## 2019-05-28 PROCEDURE — 97530 THERAPEUTIC ACTIVITIES: CPT

## 2019-05-28 PROCEDURE — 2580000003 HC RX 258: Performed by: STUDENT IN AN ORGANIZED HEALTH CARE EDUCATION/TRAINING PROGRAM

## 2019-05-28 PROCEDURE — 94640 AIRWAY INHALATION TREATMENT: CPT

## 2019-05-28 PROCEDURE — 6370000000 HC RX 637 (ALT 250 FOR IP): Performed by: INTERNAL MEDICINE

## 2019-05-28 PROCEDURE — 85018 HEMOGLOBIN: CPT

## 2019-05-28 PROCEDURE — 36415 COLL VENOUS BLD VENIPUNCTURE: CPT

## 2019-05-28 PROCEDURE — 6360000002 HC RX W HCPCS: Performed by: SURGERY

## 2019-05-28 PROCEDURE — 81001 URINALYSIS AUTO W/SCOPE: CPT

## 2019-05-28 PROCEDURE — 99233 SBSQ HOSP IP/OBS HIGH 50: CPT | Performed by: INTERNAL MEDICINE

## 2019-05-28 PROCEDURE — 6370000000 HC RX 637 (ALT 250 FOR IP): Performed by: STUDENT IN AN ORGANIZED HEALTH CARE EDUCATION/TRAINING PROGRAM

## 2019-05-28 PROCEDURE — 99232 SBSQ HOSP IP/OBS MODERATE 35: CPT | Performed by: INTERNAL MEDICINE

## 2019-05-28 RX ORDER — MAGNESIUM SULFATE 1 G/100ML
1 INJECTION INTRAVENOUS ONCE
Status: COMPLETED | OUTPATIENT
Start: 2019-05-28 | End: 2019-05-28

## 2019-05-28 RX ORDER — ACETAMINOPHEN 325 MG/1
650 TABLET ORAL EVERY 4 HOURS PRN
Status: DISCONTINUED | OUTPATIENT
Start: 2019-05-28 | End: 2019-05-31 | Stop reason: HOSPADM

## 2019-05-28 RX ADMIN — Medication 2 PUFF: at 08:48

## 2019-05-28 RX ADMIN — IRON SUCROSE 200 MG: 20 INJECTION, SOLUTION INTRAVENOUS at 11:23

## 2019-05-28 RX ADMIN — IPRATROPIUM BROMIDE AND ALBUTEROL SULFATE 1 AMPULE: .5; 3 SOLUTION RESPIRATORY (INHALATION) at 18:46

## 2019-05-28 RX ADMIN — METOCLOPRAMIDE 10 MG: 5 INJECTION, SOLUTION INTRAMUSCULAR; INTRAVENOUS at 14:40

## 2019-05-28 RX ADMIN — DEXTROSE AND SODIUM CHLORIDE: 5; 450 INJECTION, SOLUTION INTRAVENOUS at 10:42

## 2019-05-28 RX ADMIN — ACETAMINOPHEN 650 MG: 325 TABLET, FILM COATED ORAL at 10:43

## 2019-05-28 RX ADMIN — AMIODARONE HYDROCHLORIDE 100 MG: 100 TABLET ORAL at 20:59

## 2019-05-28 RX ADMIN — MAGNESIUM SULFATE HEPTAHYDRATE 1 G: 1 INJECTION, SOLUTION INTRAVENOUS at 12:23

## 2019-05-28 RX ADMIN — Medication 10 ML: at 08:47

## 2019-05-28 RX ADMIN — MIRTAZAPINE 7.5 MG: 15 TABLET, ORALLY DISINTEGRATING ORAL at 20:59

## 2019-05-28 RX ADMIN — METOCLOPRAMIDE 10 MG: 5 INJECTION, SOLUTION INTRAMUSCULAR; INTRAVENOUS at 01:57

## 2019-05-28 RX ADMIN — IPRATROPIUM BROMIDE AND ALBUTEROL SULFATE 1 AMPULE: .5; 3 SOLUTION RESPIRATORY (INHALATION) at 14:46

## 2019-05-28 RX ADMIN — IPRATROPIUM BROMIDE AND ALBUTEROL SULFATE 1 AMPULE: .5; 3 SOLUTION RESPIRATORY (INHALATION) at 06:41

## 2019-05-28 RX ADMIN — IPRATROPIUM BROMIDE AND ALBUTEROL SULFATE 1 AMPULE: .5; 3 SOLUTION RESPIRATORY (INHALATION) at 10:30

## 2019-05-28 RX ADMIN — PANTOPRAZOLE SODIUM 40 MG: 40 INJECTION, POWDER, FOR SOLUTION INTRAVENOUS at 08:47

## 2019-05-28 RX ADMIN — Medication 2 PUFF: at 20:58

## 2019-05-28 RX ADMIN — METOPROLOL TARTRATE 25 MG: 25 TABLET ORAL at 20:58

## 2019-05-28 RX ADMIN — FENTANYL CITRATE 25 MCG: 50 INJECTION, SOLUTION INTRAMUSCULAR; INTRAVENOUS at 17:43

## 2019-05-28 RX ADMIN — AMIODARONE HYDROCHLORIDE 100 MG: 100 TABLET ORAL at 08:49

## 2019-05-28 RX ADMIN — METOPROLOL TARTRATE 25 MG: 25 TABLET ORAL at 12:23

## 2019-05-28 RX ADMIN — LEVOTHYROXINE SODIUM 75 MCG: 75 TABLET ORAL at 05:34

## 2019-05-28 RX ADMIN — ACETAMINOPHEN 650 MG: 325 TABLET, FILM COATED ORAL at 14:37

## 2019-05-28 RX ADMIN — I.V. FAT EMULSION 100 ML: 20 EMULSION INTRAVENOUS at 17:26

## 2019-05-28 RX ADMIN — METOCLOPRAMIDE 10 MG: 5 INJECTION, SOLUTION INTRAMUSCULAR; INTRAVENOUS at 08:47

## 2019-05-28 RX ADMIN — CALCIUM GLUCONATE: 94 INJECTION, SOLUTION INTRAVENOUS at 17:18

## 2019-05-28 RX ADMIN — METOCLOPRAMIDE 10 MG: 5 INJECTION, SOLUTION INTRAMUSCULAR; INTRAVENOUS at 20:58

## 2019-05-28 ASSESSMENT — PAIN DESCRIPTION - PAIN TYPE
TYPE: ACUTE PAIN
TYPE: ACUTE PAIN

## 2019-05-28 ASSESSMENT — ENCOUNTER SYMPTOMS
STRIDOR: 0
NAUSEA: 0
EYE REDNESS: 0
COUGH: 0
ABDOMINAL PAIN: 1
CONSTIPATION: 0
SORE THROAT: 0
WHEEZING: 0
APNEA: 0
SHORTNESS OF BREATH: 0
ABDOMINAL DISTENTION: 0
DIARRHEA: 0
EYE DISCHARGE: 0
VOMITING: 0
CHEST TIGHTNESS: 0

## 2019-05-28 ASSESSMENT — PAIN SCALES - GENERAL
PAINLEVEL_OUTOF10: 5
PAINLEVEL_OUTOF10: 8
PAINLEVEL_OUTOF10: 0
PAINLEVEL_OUTOF10: 0
PAINLEVEL_OUTOF10: 5
PAINLEVEL_OUTOF10: 0

## 2019-05-28 ASSESSMENT — PAIN DESCRIPTION - LOCATION
LOCATION: ABDOMEN
LOCATION: ABDOMEN

## 2019-05-28 ASSESSMENT — PAIN DESCRIPTION - PROGRESSION: CLINICAL_PROGRESSION: NOT CHANGED

## 2019-05-28 ASSESSMENT — PAIN DESCRIPTION - ORIENTATION: ORIENTATION: LEFT

## 2019-05-28 ASSESSMENT — PAIN DESCRIPTION - DESCRIPTORS: DESCRIPTORS: ACHING

## 2019-05-28 NOTE — PROGRESS NOTES
Pulmonary Progress Note  [facility]      Patient - Loraine Carson,  Age - 77 y.o.    - 1950      Room Number - 1781/0646-85   MRN -  [id number]   [facility]  Date of Admission -  2019  2:48 PM        Consulting Issa Giles MD  Primary Care Physician - Roxana Roper, DO     SUBJECTIVE   Feels fine, 2 L  Denies any fever or chills  No chest pain, short of breath or much cough    OBJECTIVE   VITALS    height is 5' (1.524 m) and weight is 124 lb 5.4 oz (56.4 kg). Her oral temperature is 100.2 °F (37.9 °C). Her blood pressure is 146/73 (abnormal) and her pulse is 101. Her respiration is 20 and oxygen saturation is 99%. Body mass index is 24.28 kg/m². Temperature Range: Temp: 100.2 °F (37.9 °C) Temp  Av.8 °F (37.1 °C)  Min: 98.1 °F (36.7 °C)  Max: 100.2 °F (37.9 °C)  BP Range:  Systolic (06WWQ), INI:060 , Min:122 , QTN:388     Diastolic (78XUM), PVD:93, Min:56, Max:73    Pulse Range: Pulse  Av.3  Min: 88  Max: 101  Respiration Range: Resp  Av.6  Min: 18  Max: 22  Current Pulse Ox[de-identified]  SpO2: 99 %  24HR Pulse Ox Range:  SpO2  Av.5 %  Min: 96 %  Max: 100 %  Oxygen Amount and Delivery: O2 Flow Rate (L/min): 2 L/min    Wt Readings from Last 3 Encounters:   19 124 lb 5.4 oz (56.4 kg)   19 89 lb (40.4 kg)   19 94 lb 1.6 oz (42.7 kg)       I/O (24 Hours)    Intake/Output Summary (Last 24 hours) at 2019 1151  Last data filed at 2019 1042  Gross per 24 hour   Intake --   Output 2267 ml   Net -2267 ml       EXAM     General Appearance  Awake, alert,  in no acute distress  HEENT - normocephalic, atraumatic.    Neck - no JVD,  trachea midline   Lungs - course, no wheezing or distress  Cardiovascular - Heart sounds are normal.  regular rate and rhythm   Abdomen - Soft, tender, nondistended,   Neurologic - looks comfortable, following commands  Skin - No bruising or bleeding  Extremities - No clubbing, cyanosis, edema    MEDS      metoprolol tartrate  25 mg Oral BID    magnesium sulfate  1 g Intravenous Once    metoclopramide  10 mg Intravenous Q6H    levothyroxine  75 mcg Oral Daily    iron sucrose  200 mg Intravenous Q24H    amiodarone  100 mg Oral BID    mirtazapine  7.5 mg Oral Nightly    ipratropium-albuterol  1 ampule Inhalation 4x daily    fat emulsion  100 mL Intravenous Daily    insulin lispro  0-6 Units Subcutaneous Q6H    sodium chloride flush  10 mL Intravenous 2 times per day    pantoprazole  40 mg Intravenous Daily    And    sodium chloride (PF)  10 mL Intravenous Daily    alteplase  1 mg Intracatheter Once    mometasone-formoterol  2 puff Inhalation BID      dextrose 5 % and 0.45 % NaCl 25 mL/hr at 05/28/19 1123    PN-Adult 2-in-1 Central Line (Standard) 65 mL/hr at 05/27/19 1756    dextrose       acetaminophen, fentanNYL **OR** fentanNYL, LORazepam, glucose, dextrose, glucagon (rDNA), dextrose, sodium phosphate IVPB **OR** sodium phosphate IVPB, sodium chloride flush, ondansetron, potassium chloride, magnesium sulfate    LABS   CBC   Recent Labs     05/28/19  0551   WBC 11.5*   HGB 8.7*   HCT 26.5*   MCV 87.2        BMP:   Lab Results   Component Value Date     05/28/2019    K 3.7 05/28/2019     05/28/2019    CO2 24 05/28/2019    BUN 14 05/28/2019    LABALBU 2.4 05/25/2019    LABALBU 4.6 05/17/2012    CREATININE <0.40 05/28/2019    CALCIUM 7.4 05/28/2019    GFRAA CANNOT BE CALCULATED 05/28/2019    LABGLOM CANNOT BE CALCULATED 05/28/2019     ABGs:  Lab Results   Component Value Date    PHART 7.487 05/20/2019    PO2ART 62.2 05/20/2019    UYP3WWA 48.9 05/20/2019      Lab Results   Component Value Date    MODE PRVC 05/20/2019     Ionized Calcium:  No results found for: IONCA  Magnesium:    Lab Results   Component Value Date    MG 1.6 05/28/2019      Phosphorus:    Lab Results   Component Value Date    PHOS 2.3 05/28/2019 LIVER PROFILE No results for input(s): AST, ALT, LIPASE, BILIDIR, BILITOT, ALKPHOS in the last 72 hours. Invalid input(s): AMYLASE,  ALB  INR No results for input(s): INR in the last 72 hours. PTT No results for input(s): APTT in the last 72 hours. BNP No results for input(s): BNP in the last 72 hours.     RADIOLOGY     (See actual reports for details)    ASSESSMENT/PLAN   Principal Problem:    Acute respiratory failure ([address]  Active Problems:    Emphysematous cystitis    Hemorrhage of rectum and anus    Small bowel obstruction (HCC)    Severe COPD  Acute cholecystitis/cholecystostomy tube 4/22 then had ex lap 5/16 with  extensive lysis of adhesions, right colectomy with anastomosis and open cholecystectomy, found to have cholecystostomy tube traversing through the transverse colon  E. coli UTI/aspiration pneumonia /   History of CVA with left hemiparesis  Anxiety/depression  Recent GI bleed  Paroxysmal A. Fib/hypertension  Acute anemia     Plan:     O2 as needed  Bronchodilators  Protonix    Lovenox held because of anemia  Off Antibiotic as per ID,       Electronically signed by Elvin Brown MD on 5/28/2019 at 11:51 AM

## 2019-05-28 NOTE — PROGRESS NOTES
[facility]: TALIA ISIDRO   INPATIENT OCCUPATIONAL THERAPY  PROGRESS NOTE  Date: 2019  Patient Name: Rosangela Malone      Room: 7375/5361-68  MRN: [id number]    : 1950  (77 y.o.) Gender: female     Discharge Recommendations:  [address]       Referring Practitioner: Yolis Ayala D.O  Diagnosis: Sepsis due to UTI  General  Chart Reviewed: Yes  Patient assessed for rehabilitation services?: Yes  Referring Practitioner: Yolis Ayala D.O  Diagnosis: Sepsis due to UTI    Restrictions  Restrictions/Precautions: Fall Risk, Contact Precautions(ESBL , droplet)  Implants present? : Pacemaker  Other position/activity restrictions:  NG tube,Indwelling catheter, MORGAN drain,  R foot IV, RUE IV, L sided weakness from previous CVA      Subjective  Subjective: Pt reports her Left hand hurts with repositioning. (LUE remarkably swollen)  Comments: Pt motivated for OOB this date; pt brief filled with fecal matter upon repositioning, OOB deferred. Nursing made aware. Patient Currently in Pain: Yes  Pain Level: 5  Pain Location: Abdomen  Pain Orientation: Left  Overall Orientation Status: Within Functional Limits  Patient Observation  Observations: LUE remarkably swollen  Pain Assessment  Pain Assessment: 0-10  Pain Level: 5  Hammer-Baker Pain Rating: Hurts little more  Pain Type: Acute pain  Pain Location: Abdomen  Pain Orientation: Left  Pain Descriptors: Aching  Clinical Progression: Not changed  Response to Pain Intervention: Patient Satisfied    Objective     Bed mobility  Supine to Sit: Maximum assistance  Scooting: Dependent/Total;2 Person assistance(To head of bed, and positioned on back.)  Comment: initiated from supine>EOB, pt requiring MAxA for LLE movements, pt moving RLE per self to EOB. After repositioning initiated pt observed to have bowel in brief and required returning to supine for toileting.   Balance  Sitting Balance: Dependent/Total  Standing Balance: Unable to assess(comment) ADL  Grooming: Maximum assistance  UE Bathing: Dependent/Total;Maximum assistance  LE Bathing: Dependent/Total  UE Dressing: Dependent/Total;Maximum assistance  LE Dressing: Dependent/Total  Toileting: Dependent/Total(pt has dyer catheter)  Additional Comments: Pt able to A c RUE; pt has residual symptomys of old stroke and LUE in flaccid. Pt V/D increased cooperartion/participation this date. Transfers  Sit to stand: (TBA when pt appropriate)  Stand to sit: (TBA when pt appropriate)  Transfer Comments: not safe at this time  Type of ROM/Therapeutic Exercise  Type of ROM/Therapeutic Exercise: PROM  Comment: LUE digits/wrist for edema reduction x5                  Positioning  Adaptations: LUE repositioned and elevated, resting on soft pillows for skin protection/circulation       Assessment  Performance deficits / Impairments: Decreased functional mobility ; Decreased ADL status; Decreased ROM; Decreased strength;Decreased endurance;Decreased coordination  Assessment: LUE edema increase; trial compression glove next tx  Prognosis: Fair  Discharge Recommendations: Subacute/Skilled Nursing Facility  Activity Tolerance: Patient limited by fatigue;Patient limited by pain  Activity Tolerance: Pain w PROM lt UE. Safety Devices in place: Yes  Type of devices: Call light within reach; Left in bed;Patient at risk for falls             Patient Education:  Patient Education: edema reduction, safety c mobility, important of pericare  Learner:patient  Method: demonstration and explanation       Outcome: needs reinforcement     Plan  Safety Devices  Safety Devices in place: Yes  Type of devices: Call light within reach, Left in bed, Patient at risk for falls  Plan  Times per week: 5  Times per day: Daily  Current Treatment Recommendations: ROM, Strengthening, Patient/Caregiver Education & Training, Self-Care / ADL, Safety Education & Training, Functional Mobility Training, Equipment Evaluation, Education, & procurement(coordination)  Plan Comment: continue OT      Goals  Short term goals  Time Frame for Short term goals: by discharge  Short term goal 1: pt will participate in 15-20 minutes bilateral UE therapeutic exercises to improve rt UE strength,increase lt UE (rom,coordination,decrease swelling)  Short term goal 2: pt will demonstrates increase indpendence w ADLS (needing min assist w grooming, UB bathing and dressing) using pacing/ECWS technique  Short term goal 3: Improve bed mobility for selfcare activities  Short term goal 4: Educate pt in ECWS techniques for selfcare  Short term goal 5: Assess safety w transfers  & progress to lower body ADLS when pt is appropriate    OT Individual Minutes  Time In: 1557  Time Out: 1611  Minutes: 14      Electronically signed by KORINA Jay on 5/28/19 at 4:32 PM

## 2019-05-28 NOTE — PROGRESS NOTES
[facility])  Active Problems:    Sepsis due to urinary tract infection (HCC)    Cystitis    Emphysematous cystitis    Septic shock (HCC)    Leukemoid reaction    Aspiration pneumonia of right lower lobe due to vomit (HCC)    MRSA carrier    Urinary retention    Abdominal distention    Elevated CEA    Elevated CA 19-9 level    Cholecystitis    CRP elevated    Elevated procalcitonin    Bandemia    Centrilobular emphysema (HCC)    Hemorrhage of rectum and anus    GI bleed    Anemia    Small bowel obstruction (HCC)    Anxiety disorder    Noncompliance    Chronic obstructive pulmonary disease, unspecified ([facility]    Bacteremia due to coagulase-negative Staphylococcus    Bradycardia  Resolved Problems:    * No resolved hospital problems. *  S/P repair of dehiscence    Plan  1. DC dyer  2. Check KUB  3. If KUB looks ok will restart tube feeding  4. PO intake as tolerated  5.  Hep lock IV        Barry Lindsey, [address]

## 2019-05-28 NOTE — PROGRESS NOTES
.. PALLIATIVE CARE NURSING ASSESSMENT    Patient: Jonas Grady  Room: 0939/8673-62    Reason For Consult   Goals of care evaluation  Distress management  Guidance and support  Facilitate communications  Assistance in coordinating care      Impression: Jonas Grady is a 77y.o. year old female  has a past medical history of Abnormal computed tomography of cervical spine, CVA (cerebral vascular accident) ([address] GERD (gastroesophageal reflux disease), Hypertension, Paraproteinemia, and Weight loss. .  Currently hospitalized for the management of Sepsis. The Palliative Care Team is following to assist with goals of care and support. Vital Signs  Blood pressure (!) 117/53, pulse 95, temperature 99.6 °F (37.6 °C), temperature source Oral, resp. rate 18, height 5' (1.524 m), weight 124 lb 5.4 oz (56.4 kg), SpO2 98 %. Patient Active Problem List   Diagnosis    Paraproteinemia    CVA (cerebral vascular accident) ([address]    Abnormal computed tomography of cervical spine    Weight loss    Functional gait abnormality    Left knee pain    Knee pain, left    Acute pain of left knee    Sepsis due to urinary tract infection ([address]    Cystitis    Emphysematous cystitis    Septic shock ([address]    Leukemoid reaction    Aspiration pneumonia of right lower lobe due to vomit ([address]    MRSA carrier    Urinary retention    Abdominal distention    Elevated CEA    Elevated CA 19-9 level    Cholecystitis    CRP elevated    Elevated procalcitonin    Bandemia    Centrilobular emphysema (HCC)    Hemorrhage of rectum and anus    GI bleed    Anemia    Small bowel obstruction (HCC)    Anxiety disorder    Noncompliance    Acute respiratory failure (HCC)    Chronic obstructive pulmonary disease, unspecified (HCC)    Bacteremia due to coagulase-negative Staphylococcus    Bradycardia       Palliative Interaction:The patient is in bed , and is very alert and oriented.  Her left arm is very edematous and she has it elevated on pillows. She has oxygen via nasal cannula. We discussed her medical journey, and I told her that she has been here at the hospital a very long time, and she replied \" yes that told me 40 some days. \" We reviewed her surgery and that she had been on the ventilator after surgery , and that everything was done to preserve her life. I then asked her \" are those your wishes for future medical care, and would you want everything done for you like we did this hospital stay? \" The patient stated \"yes I would like everything done. \"     The patient's plans are to go for rehab, and then return home again . She had aids that helped her everyday 2 times daily, and she stated that it worked out well. I offered her emotional support, and she was appreciative of the visit and the support. Will follow for goals of care and support. Goals/Plan of care  Education/support to staff  Education/support to patient  Discharge planning/helping to coordinate care  Providing support for coping/adaptation/distress of patient  Discussing meaning/purpose   Continue with current plan of care  Code status clarified: Full Code  Validating patient/family distress  Continued communication updates  Patient is alert and oriented . She is hoping to get stonger and return home with home health aids. She wants all medical treatment moving forward, and wants to be a full code. Will continue to follow along for support. Morales Gomez [address] [place] [address]  Sherwin Trevino RN  [facility]: [phone]  [address]: [phone]  Work Cell: [phone]

## 2019-05-28 NOTE — PLAN OF CARE
Nutrition Problem: Inadequate oral intake  Intervention: Food and/or Nutrient Delivery: Continue current diet, Modify current Parenteral Nutrition, Continue Calorie Count  Nutritional Goals: Adequate nutritional intake or provision

## 2019-05-28 NOTE — PROGRESS NOTES
Stage I, Stage II, Surgical Wound  · Current Nutrition Therapies:  · Oral Diet Orders: Dental Soft   · Oral Diet intake: 0%, 1-25%  · Oral Nutrition Supplement (ONS) Orders: None  · ONS intake: NPO  · Parenteral Nutrition Orders:  · Type and Formula: Premix Central, 2-in-1 Custom   · Lipids: Daily, 100ml  · Rate/Volume: 65/1560 ml daily  · Duration: Continuous  · Current PN Order Provides: 1573 kcal, 78 gm pro  · Goal PN Orders Provides: Total nutrition support goal: 4346-2132 kcal, 63-68 gm protein  · Additional Calories: None  · Anthropometric Measures:  · Ht: 5' (152.4 cm)   · Current Body Wt: 124 lb (56.2 kg)(question accuracy-no increase in edema)  · Admission Body Wt: 102 lb (46.3 kg)  · Usual Body Wt: 89 lb 1.1 oz (40.4 kg)(3-30-19)  · Ideal Body Wt: 100 lb (45.4 kg), % Ideal Body 89% (based on wt 5-12)  · BMI Classification: BMI 18.5 - 24.9 Normal Weight    Nutrition Interventions:   Continue current diet, Modify current Parenteral Nutrition, Continue Calorie Count  Continued Inpatient Monitoring    Nutrition Evaluation:   · Evaluation: Progressing toward goals   · Goals: Adequate nutritional intake or provision    · Monitoring: Nutrition Progression, Meal Intake, Weight, Pertinent Labs, PN Intake, Diet Tolerance, PN Tolerance, Skin Integrity, Wound Healing, I&O, Monitor Bowel Function      ALL Rosales R.D.   Clinical Dietitian  Office: [phone]

## 2019-05-28 NOTE — PROGRESS NOTES
Infectious disease Consult Note      Patient: Alverto [place]  : 1950  Acct#:  [id number]     Date:  2019    Assessment:   Leukocytosis improving . Aspiration pneumonia treated  . Shock resolved . Cholecystitis status post cholecystectomy tube  grew Candida non-albicans and one colony of ESBL Klebsiella on culture . SBO S/p right colectomy with ileocolic anastomosis,open cholecystectomy and excision of small bowel diverticulum 5/15 . Single positive blood culture on 5/10 STAPHYLOCOCCUS SPECIES, COAGULASE NEGATIVE likely contamination     Ecoli Emphysematous cystitis initially treatedtreated     Aspiration pneumonia of right lower lobe initially     Yeast growth on sputum culture suspect contamination     MRSA carrier    Urinary retention     Anemia GI bleed followed by GI     PCN allergy      History of CVA with left hemiparesis              Recommendations:   Monitor off ABXS   Follow CBC and renal function  . Continue supportive care . Subjective:       History of Present Illness  Patient is a 77 y.o.  female admitted with Sepsis due to urinary tract infection   who is seen in consult for the same . She presented w dysuria and lower abd pain for around a week PRIOR TO ADMISSION associated with vomiting ,was found hypotensive with leukocytosis . CT suggested emphysematous cystitis,possible gastric outlet obstruction. CXR showed a right lower infiltrate. High CA-19-9,CEA  Allergy to [facility] w SOB   Urine culture  grew ESCHERICHIA COLI resistant to Ampicillin ,sensitive to all other tested ABXS . Blood cultures negative . Mycoplasma IGM 0.97   YEAST MODERATE GROWTH on sputum culture   S/P EGD   with reported esophagitis. GB US Findings suggesting acute cholecystitis. HIIDA showed absent gallbladder filling.    She was extubated on   Status post cholecystectomy tube  by interventional radiology,  cultures on  grew Candida non-albicans and one colony of ESBL Klebsiella. PICC line was placed   Tagged RBC scan  was negative . CT abdomen 5/5 showed no fluid collection ,Bowel obstruction which is suspected to be caused by an internal hernia. NG tube was placed under fluoroscopy 5/9. She had a small bowel follow-through 5/9, developed respiratory failure with hypoxia, tachycardia and tachypnea was transferred to ICU placed on BiPAP, blood pressure on the low side required Levophed,WBC up to 28.5    code status was changed to [facility] on 5/13. S/p right colectomy with ileocolic anastomosis,open cholecystectomy and excision of small bowel diverticulum 5/15 . Interval history :  She was extubated 5/20. She is feeling better today ,less abdominal pain, no nausea or vomiting. No fever. PEG tube clamped due to  high residual          Past Medical History:   Diagnosis Date    Abnormal computed tomography of cervical spine     sclerotic bone appearance     CVA (cerebral vascular accident) ([address]     left  side weakness    GERD (gastroesophageal reflux disease)     Hypertension     Paraproteinemia     Weight loss       Past Surgical History:   Procedure Laterality Date    CHOLECYSTECTOMY N/A 5/15/2019    CHOLECYSTECTOMY performed by Maddison Greenberg DO at [facility]  4/14/2019         LAPAROSCOPY N/A 5/15/2019    LAPAROSCOPY EXPLORATORY CONVERTED TO EXPLORATORY LAPAROTOMY/ RIGHT COLON RESECTION AND ANASTAMOSIS/ OPEN CHOLECYSTECTOMY/ EXTENSIVE LYSIS OF ADHESIONS performed by Maddison Greenberg DO at [facility] 10  07/2011    Pacemaker is Medtronic Revo (compatible). Leads placed in 1995 are NOT MRI compatible. Placed at [facility]. V's per Dr. Alessandro Mejia can not have an MRI.  UPPER GASTROINTESTINAL ENDOSCOPY N/A 4/16/2019    EGD ESOPHAGOGASTRODUODENOSCOPY @ BEDSIDE  ICU 2002 performed by Evert Ramírez MD at [id number]          Admission Meds  No current facility-administered medications on file prior to encounter.       Current Outpatient Medications on File Prior to Encounter   Medication Sig Dispense Refill    oxyCODONE-acetaminophen (PERCOCET) 5-325 MG per tablet Take 1 tablet by mouth every 6 hours as needed for Pain.  senna-docusate (PERICOLACE) 8.6-50 MG per tablet Take 1 tablet by mouth 2 times daily      budesonide-formoterol (SYMBICORT) 160-4.5 MCG/ACT AERO Inhale 2 puffs into the lungs 2 times daily      sucralfate (CARAFATE) 1 GM/10ML suspension Take 1 g by mouth 4 times daily       traZODone (DESYREL) 50 MG tablet Take 50 mg by mouth nightly      tiZANidine (ZANAFLEX) 2 MG tablet Take 2 mg by mouth every 8 hours as needed (left knee)       aspirin 81 MG tablet Take 81 mg by mouth daily      clopidogrel (PLAVIX) 75 MG tablet TAKE 1 TABLET DAILY 30 tablet 2    DOCQLACE 100 MG capsule TAKE 1 CAPSULE BY MOUTH IN THE MORNING & IN THE EVENING -DRINK PLENTYOF WATER WHILE TAKING THIS MEDICINE 30 capsule 1    amLODIPine (NORVASC) 10 MG tablet Take 10 mg by mouth daily.  LORazepam (ATIVAN) 0.5 MG tablet Take 0.5 mg by mouth every 6 hours as needed for Anxiety.  therapeutic multivitamin-minerals (THERAGRAN-M) tablet Take 1 tablet by mouth daily.  omeprazole (PRILOSEC) 20 MG capsule Take 20 mg by mouth daily.  venlafaxine (EFFEXOR) 37.5 MG tablet Take 37.5 mg by mouth 3 times daily.  Misc. Devices [facility]) [address] [facility] wheelchair with left fupper extremity support  Dx: stroke with left hemiparesis. 1 each 0           Allergies  Allergies   Allergen Reactions    Penicillins Shortness Of Breath and Rash    Amoxicillin         Social   Social History     Tobacco Use    Smoking status: Current Some Day Smoker     Packs/day: 1.00     Years: 30.00     Pack years: 30.00    Smokeless tobacco: Never Used   Substance Use Topics    Alcohol use: Not Currently     Alcohol/week: 16.8 oz     Types: 28 Glasses of wine per week                History reviewed. No pertinent family history.        Review of Systems  Other than above 10 systems reviewed negative    Tolerating antibiotics. Physical Exam  BP (!) 146/73   Pulse 101   Temp 100.2 °F (37.9 °C) (Oral)   Resp 20   Ht 5' (1.524 m)   Wt 124 lb 5.4 oz (56.4 kg)   SpO2 99%   BMI 24.28 kg/m²           General Appearance: Awake, oriented ,not under distress  . Skin: warm and dry, no rash or erythema  Head: normocephalic and atraumatic  Eyes: pupils equal, round  Neck: neck supple and non tender   Pulmonary/Chest: coarse to auscultation bilaterally- with  rhonchi  Cardiovascular: normal rate, regular rhythm, normal S1 and S2, no murmurs. Abdomen: soft,surgical INCISION INTACT ,NO ERYTHEMA  ,MORGAN drain serous   Extremities: no cyanosis, clubbing   Edema   PICC line in place       Data Review:    Recent Labs     05/26/19  0526 05/27/19  0507 05/27/19  1654 05/27/19  2338 05/28/19  0551   WBC 18.0* 11.9*  --   --  11.5*   HGB 7.3* 6.9* 7.8* 8.0* 8.7*   HCT 22.9* 22.1* 23.7* 24.1* 26.5*   MCV 87.7 88.8  --   --  87.2    176  --   --  318     Recent Labs     05/26/19  0526 05/27/19  0507 05/28/19  0551    137 135   K 3.9 2.9* 3.7   * 106 104   CO2 18* 21 24   PHOS  --  2.6  --    BUN 36* 29* 14   CREATININE <0.40* <0.40* <0.40*     No results for input(s): AST, ALT, ALB, BILIDIR, BILITOT, ALKPHOS in the last 72 hours. No results for input(s): LIPASE, AMYLASE in the last 72 hours. No results for input(s): PROTIME, INR in the last 72 hours. Imaging Studies:                           All appropriate imaging studies and reports reviewed: Yes       Ct Abdomen Pelvis Wo Contrast Additional Contrast? Oral    Result Date: 4/14/2019  EXAMINATION: CT OF THE ABDOMEN AND PELVIS WITHOUT CONTRAST 4/14/2019 7:34 pm TECHNIQUE: CT of the abdomen and pelvis was performed without the administration of intravenous contrast. Multiplanar reformatted images are provided for review.  Dose modulation, iterative reconstruction, and/or weight based adjustment of the mA/kV was utilized to reduce the radiation dose to as low as reasonably achievable. COMPARISON: 04/11/2019 HISTORY: ORDERING SYSTEM PROVIDED HISTORY: ABDOMINAL PAIN TECHNOLOGIST PROVIDED HISTORY: Water soluble contrast only please Ordering Physician Provided Reason for Exam: Abdominal pain - Vented patient. Contrast given via nurse through NG tube. Acuity: Unknown Type of Exam: Unknown Relevant Medical/Surgical History: Hx - Sepsis due to urinary tract infection. FINDINGS: Lower Chest: New moderate layering bilateral pleural effusions with bilateral lower lobe atelectasis. Organs: Limited evaluation due lack of intravenous contrast.  Cholelithiasis redemonstrated. No gallbladder wall thickening or biliary ductal dilatation. Scattered tiny hypodense lesions in the liver are too small to characterize but statistically represent benign cysts or hemangiomas and appear unchanged. The pancreas, spleen, adrenal glands, and kidneys are unremarkable. There is no hydronephrosis or urinary tract calculus. GI/Bowel: The stomach is distended. Enteric tube is in place. No contrast is seen distal to the pylorus and there is contrast reflux into the distal esophagus. There is no evidence of bowel obstruction. The appendix is not definitely visualized. No focal pericecal inflammatory changes are evident. Pelvis: The urinary bladder is decompressed by Tran catheter. No pelvic mass is seen. Peritoneum/Retroperitoneum: Small amount of free fluid in the pelvic cavity. No free air or focal fluid collection. No abnormal lymph node. Normal abdominal aortic caliber. Moderate calcific atherosclerosis. Bones/Soft Tissues: No acute osseous abnormality. Diffuse anasarca. Moderate degenerative changes in the lumbar spine. 1. Distended, contrast filled stomach with reflux of contrast into the esophagus. No enteric contrast is seen distal to the pylorus suggesting delayed gastric emptying in the setting of ileus.  2. New moderate layering bilateral pleural effusions with bilateral lower lobe atelectasis. 3. Small amount of nonspecific free fluid in the pelvic cavity. 4. Anasarca. 5. Cholelithiasis. Xr Chest (single View Frontal)    Result Date: 4/13/2019  EXAMINATION: SINGLE XRAY VIEW OF THE CHEST 4/13/2019 7:18 am COMPARISON: April 12, 2019 HISTORY: ORDERING SYSTEM PROVIDED HISTORY: dyspnea TECHNOLOGIST PROVIDED HISTORY: dyspnea Ordering Physician Provided Reason for Exam: dyspnea Acuity: Acute Type of Exam: Subsequent/Follow-up Additional signs and symptoms: dyspnea FINDINGS: A right IJ catheter is seen with its tip terminating at the superior cavoatrial junction. The left chest wall pacemaker and leads are stable. The cardiomediastinal silhouette is stable. There is interval increased opacity at the right lung base, may be related to atelectasis versus pneumonia. There is small atelectasis and mild pleural effusion at the left lung base. There is no pneumothorax. There is no acute osseous abnormality. Interval increased opacity at the right lung base, may be related to atelectasis versus pneumonia. Small atelectasis and mild pleural effusion at the left lung, new since the prior study. Xr Femur Left (min 2 Views)    Result Date: 3/27/2019  EXAMINATION: 4 XRAY VIEWS OF THE LEFT FEMUR; 2 XRAY VIEWS OF THE LEFT KNEE; 3 XRAY VIEWS OF THE LEFT TIBIA AND FIBULA 3/27/2019 7:00 pm COMPARISON: Left hip 11/14/2015. HISTORY: ORDERING SYSTEM PROVIDED HISTORY: pain TECHNOLOGIST PROVIDED HISTORY: pain Ordering Physician Provided Reason for Exam: pt twisted wrong, lt knee pain, unable to straighten knee. Acuity: Acute Type of Exam: Initial FINDINGS: Left femur, four views: The bones are diffusely osteopenic. No acute fracture deformity. The hip joint is maintained. The femoral head projects within the acetabulum. No focal soft tissue abnormality is seen. Left knee, two views: The knee is flexed.   No acute osseous abnormality. No joint effusion. Joint spaces are not optimally profiled but no significant arthritic changes are apparent. Left tib-fib, three views: There is evidence of a remote healed distal tibia fracture. No acute fracture or dislocation is seen. No focal soft tissue abnormality. No acute osseous abnormality of the left femur. No acute osseous abnormality of the left knee. No acute osseous abnormality of the left tib-fib. Healed remote distal tibia fracture. Marked osteopenia, likely due to disuse. Xr Knee Left (1-2 Views)    Result Date: 3/27/2019  EXAMINATION: 4 XRAY VIEWS OF THE LEFT FEMUR; 2 XRAY VIEWS OF THE LEFT KNEE; 3 XRAY VIEWS OF THE LEFT TIBIA AND FIBULA 3/27/2019 7:00 pm COMPARISON: Left hip 11/14/2015. HISTORY: ORDERING SYSTEM PROVIDED HISTORY: pain TECHNOLOGIST PROVIDED HISTORY: pain Ordering Physician Provided Reason for Exam: pt twisted wrong, lt knee pain, unable to straighten knee. Acuity: Acute Type of Exam: Initial FINDINGS: Left femur, four views: The bones are diffusely osteopenic. No acute fracture deformity. The hip joint is maintained. The femoral head projects within the acetabulum. No focal soft tissue abnormality is seen. Left knee, two views: The knee is flexed. No acute osseous abnormality. No joint effusion. Joint spaces are not optimally profiled but no significant arthritic changes are apparent. Left tib-fib, three views: There is evidence of a remote healed distal tibia fracture. No acute fracture or dislocation is seen. No focal soft tissue abnormality. No acute osseous abnormality of the left femur. No acute osseous abnormality of the left knee. No acute osseous abnormality of the left tib-fib. Healed remote distal tibia fracture. Marked osteopenia, likely due to disuse.      Xr Knee Left (3 Views)    Result Date: 3/30/2019  EXAMINATION: 3 XRAY VIEWS OF THE LEFT KNEE 3/30/2019 10:58 am COMPARISON: March 27, 2018 HISTORY: ORDERING SYSTEM PROVIDED HISTORY: F/u L knee pain after cast TECHNOLOGIST PROVIDED HISTORY: AP, oblique, lateral F/u L knee pain after cast FINDINGS: Marked osteopenia. Interval casting, which degrades fine osseous detail question cortical offset in the lateral femoral metaphysis. No malalignment identified. No significant joint effusion. Interval casting. Question nondisplaced fracture in the lateral femoral metaphysis. Osteopenia. Xr Tibia Fibula Left (2 Views)    Result Date: 3/27/2019  EXAMINATION: 4 XRAY VIEWS OF THE LEFT FEMUR; 2 XRAY VIEWS OF THE LEFT KNEE; 3 XRAY VIEWS OF THE LEFT TIBIA AND FIBULA 3/27/2019 7:00 pm COMPARISON: Left hip 11/14/2015. HISTORY: ORDERING SYSTEM PROVIDED HISTORY: pain TECHNOLOGIST PROVIDED HISTORY: pain Ordering Physician Provided Reason for Exam: pt twisted wrong, lt knee pain, unable to straighten knee. Acuity: Acute Type of Exam: Initial FINDINGS: Left femur, four views: The bones are diffusely osteopenic. No acute fracture deformity. The hip joint is maintained. The femoral head projects within the acetabulum. No focal soft tissue abnormality is seen. Left knee, two views: The knee is flexed. No acute osseous abnormality. No joint effusion. Joint spaces are not optimally profiled but no significant arthritic changes are apparent. Left tib-fib, three views: There is evidence of a remote healed distal tibia fracture. No acute fracture or dislocation is seen. No focal soft tissue abnormality. No acute osseous abnormality of the left femur. No acute osseous abnormality of the left knee. No acute osseous abnormality of the left tib-fib. Healed remote distal tibia fracture. Marked osteopenia, likely due to disuse.      Xr Abdomen (kub) (single Ap View)    Result Date: 4/14/2019  EXAMINATION: SINGLE SUPINE XRAY VIEW(S) OF THE ABDOMEN 4/14/2019 7:39 am COMPARISON: CT abdomen and pelvis  film from 11 April 2019 HISTORY: [address] [facility] [address]: Abd Distention TECHNOLOGIST PROVIDED HISTORY: Abd Distention Ordering Physician Provided Reason for Exam: Abdominal distention. Pt was moving around in the bed. Best films at present time. Acuity: Acute Type of Exam: Initial Additional signs and symptoms: Abdominal distention. Pt was moving around in the bed. Best films at present time. FINDINGS: Portable view time stamped at 748 hours demonstrates an intestinal tube terminating in the midportion of a gaseous Spike dilated stomach. Densities are present over the stomach likely medication. Bipolar pacemaker is in situ with intact leads. Heart size is top-normal, stable. Gaseous distension of the stomach and loop of bowel in the upper mid abdomen is noted but there is gas and fecal material in the rectum. Gastric outlet obstruction or proximal small bowel partial obstruction is suspected. No free air is noted. Midline city is present over the pelvis likely a monitor or CT small bore catheter. Vascular calcification is present in the pelvis. Persistent preferential gaseous distension of the stomach although there is some gas in the upper abdomen and gas and fecal material present in the rectosigmoid. Findings suggest gastric outlet obstruction. Ct Abdomen Pelvis W Iv Contrast    Result Date: 4/11/2019  EXAMINATION: CT OF THE ABDOMEN AND PELVIS WITH CONTRAST 4/11/2019 5:14 pm TECHNIQUE: CT of the abdomen and pelvis was performed with the administration of intravenous contrast. Multiplanar reformatted images are provided for review. Dose modulation, iterative reconstruction, and/or weight based adjustment of the mA/kV was utilized to reduce the radiation dose to as low as reasonably achievable. COMPARISON: None. HISTORY: ORDERING SYSTEM PROVIDED HISTORY: Abdominal pain TECHNOLOGIST PROVIDED HISTORY: IV Only Contrast Ordering Physician Provided Reason for Exam: patient c/o abd pain for an hour FINDINGS: Lower Chest: Trace pleural fluid bilaterally is noted.   Indwelling cardiac pacemaker is present. Heart size is normal.  Coronary artery calcifications are evident. The esophagus is significantly dilated and fluid-filled. No paraesophageal adenopathy is evident. Organs: The spleen, pancreas, and adrenals are unremarkable. The liver contains hypodensities, likely cysts. The gallbladder is distended and contains a solitary gallstone. The kidneys excrete contrast bilaterally. Extrarenal collecting systems are noted. The ureters are mildly dilated down to the urinary bladder without evidence of intraluminal or ureteral obstructing calculi. GI/Bowel: Marked distention of the stomach is noted; this continues to the pylorus with appearance suggesting partial gastric outlet obstruction. Fluid is present in some small bowel loops and colon distal to the stomach. No small bowel obstruction. Pelvis: Marked distention of the urinary bladder is noted. 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 Distended ureters may be related to reflux or infection. No free pelvic fluid, pelvic or inguinal adenopathy is noted. Peritoneum/Retroperitoneum: No aortic aneurysm. Mild to moderate plaque is noted in the infrarenal abdominal aorta and both proximal iliac arteries. Shotty lymph nodes are present around the aorta in the upper abdomen. No mesenteric adenopathy is noted. Bones/Soft Tissues: Degenerative changes are present in the hips and lower lumbar facets. Estimated biologic radiation dose for this procedure:258.77 mGy/cm2.     1. Dilatation of the thoracic esophagus filled with fluid. No obstructive process is noted. No adjacent enlarged lymph nodes. 2. Trace pleural fluid. 3. Marked distention of the stomach. This appears to extend to the pylorus. Partial gastric outlet obstruction is not excluded. 4. Bilateral dilated ureters without obstructing calculi. Urinary bladder is markedly distended with bladder wall air suggesting emphysematous cystitis. Dilatation of the ureters may be related to reflux or infection. 5. Atherosclerotic disease. 6. Other findings as above. Critical results were called by Dr. Toña Grover MD to [address] on 4/11/2019 at 17:37. Xr Chest Portable    Result Date: 4/16/2019  EXAMINATION: SINGLE XRAY VIEW OF THE CHEST 4/16/2019 6:54 am COMPARISON: 04/15/2019, 610 hours HISTORY: ORDERING SYSTEM PROVIDED HISTORY: ETT placement TECHNOLOGIST PROVIDED HISTORY: ETT placement Ordering Physician Provided Reason for Exam: on vent Acuity: Acute Type of Exam: Initial 55-year-old female on ventilator; check endotracheal tube placement FINDINGS: Portable AP upright view of the chest. Endotracheal tube distal tip overlying the mid trachea approximately 4.1 cm above the level of the ron. Enteric tube traverses the GE junction with distal tip excluded from the field of view. Left subclavian approach cardiac pacemaker device distal lead tips relatively stable in position. Right internal jugular approach central venous catheter distal tip overlying the high right atrium, stable. Cardiac monitor leads overlie the chest. Atherosclerotic calcification of the thoracic aorta. Slight stable volume loss of the left hemithorax. No pneumothorax. No free air. Dense retrocardiac/left basilar airspace consolidation and small left-sided pleural effusion. Stable mild focal opacity at the right mid lung zone. Underlying COPD. Stable mild pulmonary vascular congestion and left-sided predominant parahilar opacity. Visualized osseous structures remain unchanged. 1. Stable multifocal airspace disease as detailed above with dense retrocardiac/left basilar airspace consolidation and small left-sided pleural effusion. Mild pulmonary vascular congestion. Findings may represent edema or multifocal pneumonia. 2. Underlying COPD. 3. Tubes and line as detailed above.      Xr Chest Portable    Result Date: 4/15/2019  EXAMINATION: SINGLE XRAY VIEW OF THE CHEST 4/15/2019 6:47 am COMPARISON: 14 April 2019 HISTORY: ORDERING SYSTEM PROVIDED HISTORY: ETT placement TECHNOLOGIST PROVIDED HISTORY: ETT placement Ordering Physician Provided Reason for Exam: on vent Acuity: Acute Type of Exam: Initial FINDINGS: AP portable view of the chest time stamped at 612 hours demonstrates overlying cardiac monitoring electrodes. Endotracheal tube terminates 4 cm above the ron. Bipolar pacemaker enters from the left with intact leads in appropriate positions. Intestinal tube extends beyond the fundus of the stomach, tip not included. Right internal jugular catheter terminates at the cavoatrial junction. Heart size is normal.  Aortic arch is calcified. There is interval improvement in vascular congestion with resolution of perihilar opacities. Some bibasilar opacities remain. No extrapleural air is noted. Osseous structures are stable. Interval improvement in vascular congestion and bilateral opacities consistent with resolving pulmonary edema. Tubes and lines as above. Xr Chest Portable    Result Date: 4/14/2019  EXAMINATION: SINGLE XRAY VIEW OF THE CHEST 4/14/2019 7:57 am COMPARISON: Portable chest 04/13/2019. HISTORY: ORDERING SYSTEM PROVIDED HISTORY: Intubation TECHNOLOGIST PROVIDED HISTORY: Intubation Ordering Physician Provided Reason for Exam: intubation Acuity: Acute Type of Exam: Initial Additional signs and symptoms: intubation FINDINGS: Endotracheal tube terminates over the midthoracic trachea. Dual-chamber pacemaker leads appear unchanged in position. Right IJ approach central venous catheter unchanged in position. Heart size not substantially changed. Perihilar and basilar opacities further increased. Left pleural effusion increased in size. Findings may reflect pulmonary edema, progressed from yesterday's exam.  Left pleural effusion increased in size.      Xr Chest Portable    Result Date: 4/12/2019  EXAMINATION: SINGLE XRAY VIEW OF THE CHEST 4/12/2019 5:26 am COMPARISON: November 14, 2015. HISTORY: ORDERING SYSTEM PROVIDED HISTORY: line placement TECHNOLOGIST PROVIDED HISTORY: line placement Ordering Physician Provided Reason for Exam: New right side line placement. Acuity: Acute Type of Exam: Initial Additional signs and symptoms: New right side line placement. FINDINGS: Stable left pectoral trans venous cardiac pacer device. New right IJ central venous catheter with tip near the superior atrial caval junction. Normal lung volume. No new consolidation. Curvilinear radiopacity projecting over the right upper lobe likely represents artifact from a skin fold. No pleural effusion or pneumothorax. Stable cardiomediastinal silhouette and great vessels with redemonstration of atherosclerotic thoracic aorta. New right IJ central venous catheter with tip near the superior atrial caval junction. No pneumothorax. No new consolidation. Thank you for allowing me to participate in the care of your patient. Please feel free to contact me with any questions or concerns.      Binu Bullard MD

## 2019-05-28 NOTE — FLOWSHEET NOTE
Temp has temp 100.2. RN spoke to [facility] for Dr Avery Her, new orders for tylenol and urine specimen . Urine sent to lab.   Corrina Bustos RN

## 2019-05-28 NOTE — CARE COORDINATION
DISCHARGE PLANNING NOTE:     Plan is [facility] vs [facility].      Patient had G-tube placement and was started on tube feeds at 10 yesterday, however is now clamped d/t high residual.  KUB ordered for this morning. Soft Diet started as well. Per nursing, pt ate only a few bites. Remains on TPN and Lipids.      Unsure if [facility] will be able to accept this patient now that she is started on tube feeds.      Will continue to follow along with LSW for discharge needs.      Electronically signed by Chay Jaimes RN on 5/28/2019 at 9:19 AM

## 2019-05-28 NOTE — PROGRESS NOTES
[address] [facility].    PROGRESS NOTE             5/28/2019    11:33 AM    Name:   Yoselin Ayala  MRN:     [id number]     Acct:      [de-identified]   Room:   2048/2048-01  IP Day:  52  Admit Date:  4/11/2019  2:48 PM    PCP:  Les Flores DO  Code Status:  Full Code    Subjective: Interval History Status: improved. Patient seen and examined at bedside in the ICU. Afebrile, VSS. Patient appears better today visually. Alert and awake. Overnight apparently the patient was found pulling at lines. Mitts were placed accordingly and was redirected. RUQ pain has significantly decreased - HIDA scan yesterday was not able to visualize the GB. WBC trending down. Afebrile. Tachycardic into the 150's overnight, required Amiodarone drip and an increase in PRN lopressor. This AM HR <102, SBP controlled. I do not believe she is medically stable enough still at this time for the OR. Repeat Blood cx -ve @ 3 days    CXR stable in interval.     Prealbumin 11.8       5/28    pst op long icu course     S/p open ashley, ex lap, R colectomy w/ ileocolic anastomosis, excision small bowel diverticulum, extensive enterolysis. Intubated on vent, low pressor requirement, worsening edema, on solumedrol and albumin support. Extubated on 5/20. Edema improving, feeding transitioning to NGT. Brief History:     Per EMR:     The patient is a 77 y.o.  Female who presents withAbdominal Pain   and she is admitted to the hospital for the management of abdominal distension, nausea, vomiting, and acute cystitis.      Patient presents with chills, nausea, vomiting, abdominal pain (diffuse, but worse in the suprapubic region), abdominal distension, and dysuria of 2-3 days duration. Denies hematemesis. Acknowledges difficulty keeping food down but tolerating fluids.  States abdominal pain is a 6/10 on average, and denies trying any medication to alleviate her 2-in-1 Central Line (Standard) 65 mL/hr at 19 1756    dextrose       PRN Meds: acetaminophen, fentanNYL **OR** fentanNYL, LORazepam, glucose, dextrose, glucagon (rDNA), dextrose, sodium phosphate IVPB **OR** sodium phosphate IVPB, sodium chloride flush, ondansetron, potassium chloride, magnesium sulfate    Data:     Past Medical History:   has a past medical history of Abnormal computed tomography of cervical spine, CVA (cerebral vascular accident) ([address] GERD (gastroesophageal reflux disease), Hypertension, Paraproteinemia, and Weight loss. Social History:   reports that she has been smoking. She has a 30.00 pack-year smoking history. She has never used smokeless tobacco. She reports that she drank about 16.8 oz of alcohol per week. She reports that she does not use drugs. Family History: History reviewed. No pertinent family history. Vitals:  BP (!) 146/73   Pulse 101   Temp 100.2 °F (37.9 °C) (Oral)   Resp 20   Ht 5' (1.524 m)   Wt 124 lb 5.4 oz (56.4 kg)   SpO2 99%   BMI 24.28 kg/m²   Temp (24hrs), Av.7 °F (37.1 °C), Min:98.1 °F (36.7 °C), Max:100.2 °F (37.9 °C)    Recent Labs     19  1122 19  0003 19  0534 19  1121   POCGLU 82 106* 129* 123*       I/O(24Hr): Intake/Output Summary (Last 24 hours) at 2019 1133  Last data filed at 2019 1042  Gross per 24 hour   Intake 423.75 ml   Output 2267 ml   Net -1843.25 ml       Labs:    [unfilled]    Lab Results   Component Value Date/Time    SPECIAL R AC 5CC PURPLE 3CC RED 2019 12:20 PM     Lab Results   Component Value Date/Time    CULTURE NO GROWTH 6 DAYS 2019 12:20 PM       [unfilled]    Radiology:    [place] A[place] Femur Left (min 2 Views)    Result Date: 3/27/2019  EXAMINATION: 4 XRAY VIEWS OF THE LEFT FEMUR; 2 XRAY VIEWS OF THE LEFT KNEE; 3 XRAY VIEWS OF THE LEFT TIBIA AND FIBULA 3/27/2019 7:00 pm COMPARISON: Left hip 2015.  HISTORY: ORDERING SYSTEM PROVIDED HISTORY: pain TECHNOLOGIST PROVIDED HISTORY: pain Ordering Physician Provided Reason for Exam: pt twisted wrong, lt knee pain, unable to straighten knee. Acuity: Acute Type of Exam: Initial FINDINGS: Left femur, four views: The bones are diffusely osteopenic. No acute fracture deformity. The hip joint is maintained. The femoral head projects within the acetabulum. No focal soft tissue abnormality is seen. Left knee, two views: The knee is flexed. No acute osseous abnormality. No joint effusion. Joint spaces are not optimally profiled but no significant arthritic changes are apparent. Left tib-fib, three views: There is evidence of a remote healed distal tibia fracture. No acute fracture or dislocation is seen. No focal soft tissue abnormality. No acute osseous abnormality of the left femur. No acute osseous abnormality of the left knee. No acute osseous abnormality of the left tib-fib. Healed remote distal tibia fracture. Marked osteopenia, likely due to disuse. Xr Knee Left (1-2 Views)    Result Date: 3/27/2019  EXAMINATION: 4 XRAY VIEWS OF THE LEFT FEMUR; 2 XRAY VIEWS OF THE LEFT KNEE; 3 XRAY VIEWS OF THE LEFT TIBIA AND FIBULA 3/27/2019 7:00 pm COMPARISON: Left hip 11/14/2015. HISTORY: ORDERING SYSTEM PROVIDED HISTORY: pain TECHNOLOGIST PROVIDED HISTORY: pain Ordering Physician Provided Reason for Exam: pt twisted wrong, lt knee pain, unable to straighten knee. Acuity: Acute Type of Exam: Initial FINDINGS: Left femur, four views: The bones are diffusely osteopenic. No acute fracture deformity. The hip joint is maintained. The femoral head projects within the acetabulum. No focal soft tissue abnormality is seen. Left knee, two views: The knee is flexed. No acute osseous abnormality. No joint effusion. Joint spaces are not optimally profiled but no significant arthritic changes are apparent. Left tib-fib, three views: There is evidence of a remote healed distal tibia fracture. No acute fracture or dislocation is seen.   No focal soft tissue abnormality. No acute osseous abnormality of the left femur. No acute osseous abnormality of the left knee. No acute osseous abnormality of the left tib-fib. Healed remote distal tibia fracture. Marked osteopenia, likely due to disuse. Xr Knee Left (3 Views)    Result Date: 3/30/2019  EXAMINATION: 3 XRAY VIEWS OF THE LEFT KNEE 3/30/2019 10:58 am COMPARISON: March 27, 2018 HISTORY: ORDERING SYSTEM PROVIDED HISTORY: F/u L knee pain after cast TECHNOLOGIST PROVIDED HISTORY: AP, oblique, lateral F/u L knee pain after cast FINDINGS: Marked osteopenia. Interval casting, which degrades fine osseous detail question cortical offset in the lateral femoral metaphysis. No malalignment identified. No significant joint effusion. Interval casting. Question nondisplaced fracture in the lateral femoral metaphysis. Osteopenia. Xr Tibia Fibula Left (2 Views)    Result Date: 3/27/2019  EXAMINATION: 4 XRAY VIEWS OF THE LEFT FEMUR; 2 XRAY VIEWS OF THE LEFT KNEE; 3 XRAY VIEWS OF THE LEFT TIBIA AND FIBULA 3/27/2019 7:00 pm COMPARISON: Left hip 11/14/2015. HISTORY: ORDERING SYSTEM PROVIDED HISTORY: pain TECHNOLOGIST PROVIDED HISTORY: pain Ordering Physician Provided Reason for Exam: pt twisted wrong, lt knee pain, unable to straighten knee. Acuity: Acute Type of Exam: Initial FINDINGS: Left femur, four views: The bones are diffusely osteopenic. No acute fracture deformity. The hip joint is maintained. The femoral head projects within the acetabulum. No focal soft tissue abnormality is seen. Left knee, two views: The knee is flexed. No acute osseous abnormality. No joint effusion. Joint spaces are not optimally profiled but no significant arthritic changes are apparent. Left tib-fib, three views: There is evidence of a remote healed distal tibia fracture. No acute fracture or dislocation is seen. No focal soft tissue abnormality. No acute osseous abnormality of the left femur.  No acute osseous abnormality of the left knee. No acute osseous abnormality of the left tib-fib. Healed remote distal tibia fracture. Marked osteopenia, likely due to disuse. Ct Abdomen Pelvis W Iv Contrast    Result Date: 4/11/2019  EXAMINATION: CT OF THE ABDOMEN AND PELVIS WITH CONTRAST 4/11/2019 5:14 pm TECHNIQUE: CT of the abdomen and pelvis was performed with the administration of intravenous contrast. Multiplanar reformatted images are provided for review. Dose modulation, iterative reconstruction, and/or weight based adjustment of the mA/kV was utilized to reduce the radiation dose to as low as reasonably achievable. COMPARISON: None. HISTORY: ORDERING SYSTEM PROVIDED HISTORY: Abdominal pain TECHNOLOGIST PROVIDED HISTORY: IV Only Contrast Ordering Physician Provided Reason for Exam: patient c/o abd pain for an hour FINDINGS: Lower Chest: Trace pleural fluid bilaterally is noted. Indwelling cardiac pacemaker is present. Heart size is normal.  Coronary artery calcifications are evident. The esophagus is significantly dilated and fluid-filled. No paraesophageal adenopathy is evident. Organs: The spleen, pancreas, and adrenals are unremarkable. The liver contains hypodensities, likely cysts. The gallbladder is distended and contains a solitary gallstone. The kidneys excrete contrast bilaterally. Extrarenal collecting systems are noted. The ureters are mildly dilated down to the urinary bladder without evidence of intraluminal or ureteral obstructing calculi. GI/Bowel: Marked distention of the stomach is noted; this continues to the pylorus with appearance suggesting partial gastric outlet obstruction. Fluid is present in some small bowel loops and colon distal to the stomach. No small bowel obstruction. Pelvis: Marked distention of the urinary bladder is noted. 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 Distended ureters may be related to reflux or infection.   No free pelvic fluid, pelvic or inguinal adenopathy is noted. Peritoneum/Retroperitoneum: No aortic aneurysm. Mild to moderate plaque is noted in the infrarenal abdominal aorta and both proximal iliac arteries. Shotty lymph nodes are present around the aorta in the upper abdomen. No mesenteric adenopathy is noted. Bones/Soft Tissues: Degenerative changes are present in the hips and lower lumbar facets. Estimated biologic radiation dose for this procedure:258.77 mGy/cm2.     1. Dilatation of the thoracic esophagus filled with fluid. No obstructive process is noted. No adjacent enlarged lymph nodes. 2. Trace pleural fluid. 3. Marked distention of the stomach. This appears to extend to the pylorus. Partial gastric outlet obstruction is not excluded. 4. Bilateral dilated ureters without obstructing calculi. Urinary bladder is markedly distended with bladder wall air suggesting emphysematous cystitis. Dilatation of the ureters may be related to reflux or infection. 5. Atherosclerotic disease. 6. Other findings as above. Critical results were called by Dr. Justice Snowden MD to [address] [place] [address] on 4/11/2019 at 17:37. Xr Chest Portable    Result Date: 4/12/2019  EXAMINATION: SINGLE XRAY VIEW OF THE CHEST 4/12/2019 5:26 am COMPARISON: November 14, 2015. HISTORY: ORDERING SYSTEM PROVIDED HISTORY: line placement TECHNOLOGIST PROVIDED HISTORY: line placement Ordering Physician Provided Reason for Exam: New right side line placement. Acuity: Acute Type of Exam: Initial Additional signs and symptoms: New right side line placement. FINDINGS: Stable left pectoral trans venous cardiac pacer device. New right IJ central venous catheter with tip near the superior atrial caval junction. Normal lung volume. No new consolidation. Curvilinear radiopacity projecting over the right upper lobe likely represents artifact from a skin fold. No pleural effusion or pneumothorax.   Stable cardiomediastinal silhouette and great vessels with redemonstration of atherosclerotic thoracic aorta. New right IJ central venous catheter with tip near the superior atrial caval junction. No pneumothorax. No new consolidation. Physical Examination:        Physical Exam   Constitutional: She appears well-developed. She appears cachectic. She appears ill. No distress. Intubated   HENT:   Head: Normocephalic and atraumatic. Eyes: Pupils are equal, round, and reactive to light. Conjunctivae and EOM are normal.   Neck: Normal range of motion. Right IJ central line in place. Site clean and dry. Cardiovascular: Normal rate, regular rhythm, normal heart sounds and intact distal pulses. Exam reveals no gallop and no friction rub. No murmur heard. Pulmonary/Chest: Effort normal. No stridor. No respiratory distress. She has no wheezes. She has no rales. Intubated and mechanically ventilated. Abdominal: Soft. Bowel sounds are normal. She exhibits no mass. There is tenderness (Decreased tenderness to light palpation. Improvement from prior. ). There is guarding. There is no rebound. RUQ tenderness    Musculoskeletal: Normal range of motion. She exhibits no edema (Anasarca; 3+ b/l pitting edema in upper and lower ext. ). Left knee wrapped in dressing. Posterior bruising noted. Neurological:   Intubated, sedated   Skin: Skin is warm and dry. Capillary refill takes less than 2 seconds. She is not diaphoretic. No pallor. Nursing note and vitals reviewed.     Assessment:        Primary Problem  Acute respiratory failure [facility])    Active Hospital Problems    Diagnosis Date Noted    Bradycardia [R00.1]     Bacteremia due to coagulase-negative Staphylococcus [R78.81]     Chronic obstructive pulmonary disease, unspecified ([facility] [J44.9] 05/24/2019    Acute respiratory failure ([facility] [J96.00] 05/18/2019    Small bowel obstruction ([facility] [K56.609]     Anxiety disorder [F41.9]     Noncompliance [Z91.19]     Anemia [D64.9]     GI bleed [K92.2] 05/03/2019    Hemorrhage of rectum and anus [K62.5]     Centrilobular emphysema ([facility] [J43.2] 04/30/2019    CRP elevated [R79.82]     Elevated procalcitonin [R79.89]     Bandemia [D72.825]     Cholecystitis [K81.9]     Abdominal distention [R14.0]     Elevated CEA [R97.0]     Elevated CA 19-9 level [R97.8]     Urinary retention [R33.9]     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]     Septic shock ([facility] [A41.9, R65.21]     Leukemoid reaction [D72.823]     Aspiration pneumonia of right lower lobe due to vomit ([facility] [J69.0]     MRSA carrier [Z22.322]     Sepsis due to urinary tract infection ([facility] [A41.9, N39.0] 04/11/2019    Cystitis [N30.90] 04/11/2019       Plan:             cholecystitis s/pex-lap, open cholecystectomy, right colectomy w/ ileocolic anastomosis, extensive enterolysis, POD#11  - ID following - Flagyl, Cipro   - Surgery following - soft diet, s/p G-tube placement on 5/25 w/ dehiscence repair, tube feed 10ml (do not advance), Reglan 10mg q6hrs  - Pulm following - Duoneb, Dulera, O2 PRN         Anemia 2/2 ABLA vs. AOCD, multifactorial  - Hgb 6.9 (from 7.3), transfuse 1U pRBC  - H+H q8hrs, transfuse < 7  - Venofer 200mg qd x 5 doses      Paroxysmal A.fib - resolved   - Amio 100mg BID     Hypothyroid  - TSH - 79  - Levothyroxine increased to 75, will continue to titrate up     Severe malnutrition  - Encourage PO intake  - C/w tube feeds per G tube  - Mirtazapine qhs      Hypokalemia  - K 2.9  - replace, 60meq total  - f/u repeat BMP     DVT PPx  - Lovenox     Dispo  - PT/OT  - SW following - pending [facility] ([id number], Dr. Sorin Ashby) vs. [facility]       5/28   transfused 5/27   hb 6.9   today 8.7      Not tolerating tf   clamped    On tpn   tolerating po            Clamp  g tube   decrease tpn    Increase po nutritional support   follow hb      afib    vr      100    bp 146    Add lopressor   on amioderone po      k 3.7   cr nl   Doretha Rausch MD  5/28/2019  11:33 AM   Dixie Thompson Emelia  Electronically signed by Kay Camarena MD on 5/28/2019 at 11:33 AM

## 2019-05-29 ENCOUNTER — APPOINTMENT (OUTPATIENT)
Dept: GENERAL RADIOLOGY | Age: 69
DRG: 853 | End: 2019-05-29
Payer: COMMERCIAL

## 2019-05-29 LAB
ABSOLUTE EOS #: 0.2 K/UL (ref 0–0.4)
ABSOLUTE IMMATURE GRANULOCYTE: ABNORMAL K/UL (ref 0–0.3)
ABSOLUTE LYMPH #: 0.9 K/UL (ref 1–4.8)
ABSOLUTE MONO #: 0.8 K/UL (ref 0.1–1.3)
ANION GAP SERPL CALCULATED.3IONS-SCNC: 12 MMOL/L (ref 9–17)
BASOPHILS # BLD: 0 % (ref 0–2)
BASOPHILS ABSOLUTE: 0 K/UL (ref 0–0.2)
BILIRUBIN URINE: NEGATIVE
BUN BLDV-MCNC: 12 MG/DL (ref 8–23)
BUN/CREAT BLD: ABNORMAL (ref 9–20)
CALCIUM SERPL-MCNC: 7.4 MG/DL (ref 8.6–10.4)
CHLORIDE BLD-SCNC: 102 MMOL/L (ref 98–107)
CO2: 23 MMOL/L (ref 20–31)
COLOR: YELLOW
COMMENT UA: NORMAL
CREAT SERPL-MCNC: <0.4 MG/DL (ref 0.5–0.9)
DIFFERENTIAL TYPE: ABNORMAL
EOSINOPHILS RELATIVE PERCENT: 2 % (ref 0–4)
GFR AFRICAN AMERICAN: ABNORMAL ML/MIN
GFR NON-AFRICAN AMERICAN: ABNORMAL ML/MIN
GFR SERPL CREATININE-BSD FRML MDRD: ABNORMAL ML/MIN/{1.73_M2}
GFR SERPL CREATININE-BSD FRML MDRD: ABNORMAL ML/MIN/{1.73_M2}
GLUCOSE BLD-MCNC: 101 MG/DL (ref 65–105)
GLUCOSE BLD-MCNC: 110 MG/DL (ref 65–105)
GLUCOSE BLD-MCNC: 123 MG/DL (ref 65–105)
GLUCOSE BLD-MCNC: 124 MG/DL (ref 70–99)
GLUCOSE BLD-MCNC: 85 MG/DL (ref 65–105)
GLUCOSE URINE: NEGATIVE
HCT VFR BLD CALC: 27.1 % (ref 36–46)
HCT VFR BLD CALC: 27.3 % (ref 36–46)
HCT VFR BLD CALC: 27.4 % (ref 36–46)
HCT VFR BLD CALC: 27.4 % (ref 36–46)
HEMOGLOBIN: 8.8 G/DL (ref 12–16)
HEMOGLOBIN: 9 G/DL (ref 12–16)
IMMATURE GRANULOCYTES: ABNORMAL %
KETONES, URINE: NEGATIVE
LACTIC ACID: 0.9 MMOL/L (ref 0.5–2.2)
LEUKOCYTE ESTERASE, URINE: NEGATIVE
LYMPHOCYTES # BLD: 8 % (ref 24–44)
MAGNESIUM: 1.9 MG/DL (ref 1.6–2.6)
MCH RBC QN AUTO: 28.7 PG (ref 26–34)
MCHC RBC AUTO-ENTMCNC: 33.1 G/DL (ref 31–37)
MCV RBC AUTO: 86.8 FL (ref 80–100)
MONOCYTES # BLD: 6 % (ref 1–7)
NITRITE, URINE: NEGATIVE
NRBC AUTOMATED: ABNORMAL PER 100 WBC
PDW BLD-RTO: 16.2 % (ref 11.5–14.9)
PH UA: 7.5 (ref 5–8)
PHOSPHORUS: 2 MG/DL (ref 2.6–4.5)
PLATELET # BLD: 329 K/UL (ref 150–450)
PLATELET ESTIMATE: ABNORMAL
PMV BLD AUTO: 8.1 FL (ref 6–12)
POTASSIUM SERPL-SCNC: 2.8 MMOL/L (ref 3.7–5.3)
PROCALCITONIN: 0.12 NG/ML
PROTEIN UA: NEGATIVE
RBC # BLD: 3.15 M/UL (ref 4–5.2)
RBC # BLD: ABNORMAL 10*6/UL
SEG NEUTROPHILS: 84 % (ref 36–66)
SEGMENTED NEUTROPHILS ABSOLUTE COUNT: 10.4 K/UL (ref 1.3–9.1)
SODIUM BLD-SCNC: 137 MMOL/L (ref 135–144)
SPECIFIC GRAVITY UA: 1.01 (ref 1–1.03)
TURBIDITY: CLEAR
URINE HGB: NEGATIVE
UROBILINOGEN, URINE: NORMAL
WBC # BLD: 12.3 K/UL (ref 3.5–11)
WBC # BLD: ABNORMAL 10*3/UL

## 2019-05-29 PROCEDURE — 94640 AIRWAY INHALATION TREATMENT: CPT

## 2019-05-29 PROCEDURE — 6370000000 HC RX 637 (ALT 250 FOR IP): Performed by: SURGERY

## 2019-05-29 PROCEDURE — 6370000000 HC RX 637 (ALT 250 FOR IP): Performed by: INTERNAL MEDICINE

## 2019-05-29 PROCEDURE — 97530 THERAPEUTIC ACTIVITIES: CPT

## 2019-05-29 PROCEDURE — 2580000003 HC RX 258: Performed by: INTERNAL MEDICINE

## 2019-05-29 PROCEDURE — 80048 BASIC METABOLIC PNL TOTAL CA: CPT

## 2019-05-29 PROCEDURE — 2500000003 HC RX 250 WO HCPCS: Performed by: SURGERY

## 2019-05-29 PROCEDURE — 85018 HEMOGLOBIN: CPT

## 2019-05-29 PROCEDURE — 81003 URINALYSIS AUTO W/O SCOPE: CPT

## 2019-05-29 PROCEDURE — 6360000002 HC RX W HCPCS: Performed by: INTERNAL MEDICINE

## 2019-05-29 PROCEDURE — 83605 ASSAY OF LACTIC ACID: CPT

## 2019-05-29 PROCEDURE — 2580000003 HC RX 258: Performed by: SURGERY

## 2019-05-29 PROCEDURE — C9113 INJ PANTOPRAZOLE SODIUM, VIA: HCPCS | Performed by: SURGERY

## 2019-05-29 PROCEDURE — 97110 THERAPEUTIC EXERCISES: CPT

## 2019-05-29 PROCEDURE — 6370000000 HC RX 637 (ALT 250 FOR IP): Performed by: STUDENT IN AN ORGANIZED HEALTH CARE EDUCATION/TRAINING PROGRAM

## 2019-05-29 PROCEDURE — 97760 ORTHOTIC MGMT&TRAING 1ST ENC: CPT

## 2019-05-29 PROCEDURE — 99233 SBSQ HOSP IP/OBS HIGH 50: CPT | Performed by: INTERNAL MEDICINE

## 2019-05-29 PROCEDURE — 85014 HEMATOCRIT: CPT

## 2019-05-29 PROCEDURE — 36415 COLL VENOUS BLD VENIPUNCTURE: CPT

## 2019-05-29 PROCEDURE — 84145 PROCALCITONIN (PCT): CPT

## 2019-05-29 PROCEDURE — 84100 ASSAY OF PHOSPHORUS: CPT

## 2019-05-29 PROCEDURE — 6360000002 HC RX W HCPCS: Performed by: SURGERY

## 2019-05-29 PROCEDURE — 1200000000 HC SEMI PRIVATE

## 2019-05-29 PROCEDURE — 83735 ASSAY OF MAGNESIUM: CPT

## 2019-05-29 PROCEDURE — 82947 ASSAY GLUCOSE BLOOD QUANT: CPT

## 2019-05-29 PROCEDURE — 71045 X-RAY EXAM CHEST 1 VIEW: CPT

## 2019-05-29 PROCEDURE — 94761 N-INVAS EAR/PLS OXIMETRY MLT: CPT

## 2019-05-29 PROCEDURE — 2580000003 HC RX 258: Performed by: STUDENT IN AN ORGANIZED HEALTH CARE EDUCATION/TRAINING PROGRAM

## 2019-05-29 PROCEDURE — 99232 SBSQ HOSP IP/OBS MODERATE 35: CPT | Performed by: INTERNAL MEDICINE

## 2019-05-29 PROCEDURE — 85025 COMPLETE CBC W/AUTO DIFF WBC: CPT

## 2019-05-29 PROCEDURE — 6360000002 HC RX W HCPCS: Performed by: STUDENT IN AN ORGANIZED HEALTH CARE EDUCATION/TRAINING PROGRAM

## 2019-05-29 RX ORDER — METOPROLOL TARTRATE 50 MG/1
50 TABLET, FILM COATED ORAL 2 TIMES DAILY
Status: DISCONTINUED | OUTPATIENT
Start: 2019-05-29 | End: 2019-05-31 | Stop reason: HOSPADM

## 2019-05-29 RX ADMIN — POTASSIUM CHLORIDE: 2 INJECTION, SOLUTION, CONCENTRATE INTRAVENOUS at 11:32

## 2019-05-29 RX ADMIN — METOPROLOL TARTRATE 50 MG: 50 TABLET ORAL at 22:24

## 2019-05-29 RX ADMIN — AMIODARONE HYDROCHLORIDE 100 MG: 100 TABLET ORAL at 22:23

## 2019-05-29 RX ADMIN — FENTANYL CITRATE 50 MCG: 50 INJECTION INTRAMUSCULAR; INTRAVENOUS at 08:43

## 2019-05-29 RX ADMIN — MIRTAZAPINE 7.5 MG: 15 TABLET, ORALLY DISINTEGRATING ORAL at 22:25

## 2019-05-29 RX ADMIN — CALCIUM GLUCONATE: 94 INJECTION, SOLUTION INTRAVENOUS at 18:19

## 2019-05-29 RX ADMIN — METOPROLOL TARTRATE 25 MG: 25 TABLET ORAL at 08:43

## 2019-05-29 RX ADMIN — IPRATROPIUM BROMIDE AND ALBUTEROL SULFATE 1 AMPULE: .5; 3 SOLUTION RESPIRATORY (INHALATION) at 19:06

## 2019-05-29 RX ADMIN — PANTOPRAZOLE SODIUM 40 MG: 40 INJECTION, POWDER, FOR SOLUTION INTRAVENOUS at 08:42

## 2019-05-29 RX ADMIN — DEXTROSE AND SODIUM CHLORIDE: 5; 450 INJECTION, SOLUTION INTRAVENOUS at 23:12

## 2019-05-29 RX ADMIN — Medication 2 PUFF: at 22:22

## 2019-05-29 RX ADMIN — IPRATROPIUM BROMIDE AND ALBUTEROL SULFATE 1 AMPULE: .5; 3 SOLUTION RESPIRATORY (INHALATION) at 07:05

## 2019-05-29 RX ADMIN — I.V. FAT EMULSION 100 ML: 20 EMULSION INTRAVENOUS at 18:19

## 2019-05-29 RX ADMIN — FENTANYL CITRATE 50 MCG: 50 INJECTION INTRAMUSCULAR; INTRAVENOUS at 10:15

## 2019-05-29 RX ADMIN — SODIUM PHOSPHATE, MONOBASIC, MONOHYDRATE 7.62 MMOL: 276; 142 INJECTION, SOLUTION INTRAVENOUS at 18:21

## 2019-05-29 RX ADMIN — IRON SUCROSE 200 MG: 20 INJECTION, SOLUTION INTRAVENOUS at 10:15

## 2019-05-29 RX ADMIN — IPRATROPIUM BROMIDE AND ALBUTEROL SULFATE 1 AMPULE: .5; 3 SOLUTION RESPIRATORY (INHALATION) at 14:54

## 2019-05-29 RX ADMIN — METOCLOPRAMIDE 10 MG: 5 INJECTION, SOLUTION INTRAMUSCULAR; INTRAVENOUS at 22:27

## 2019-05-29 RX ADMIN — METOCLOPRAMIDE 10 MG: 5 INJECTION, SOLUTION INTRAMUSCULAR; INTRAVENOUS at 08:42

## 2019-05-29 RX ADMIN — Medication 10 ML: at 08:42

## 2019-05-29 RX ADMIN — LEVOTHYROXINE SODIUM 75 MCG: 75 TABLET ORAL at 05:13

## 2019-05-29 RX ADMIN — AMIODARONE HYDROCHLORIDE 100 MG: 100 TABLET ORAL at 08:44

## 2019-05-29 RX ADMIN — FENTANYL CITRATE 50 MCG: 50 INJECTION INTRAMUSCULAR; INTRAVENOUS at 00:18

## 2019-05-29 RX ADMIN — Medication 2 PUFF: at 08:43

## 2019-05-29 RX ADMIN — METOCLOPRAMIDE 10 MG: 5 INJECTION, SOLUTION INTRAMUSCULAR; INTRAVENOUS at 00:19

## 2019-05-29 ASSESSMENT — ENCOUNTER SYMPTOMS
SORE THROAT: 0
DIARRHEA: 0
EYE REDNESS: 0
VOMITING: 0
STRIDOR: 0
WHEEZING: 0
EYE DISCHARGE: 0
ABDOMINAL PAIN: 1
APNEA: 0
NAUSEA: 0
ABDOMINAL DISTENTION: 0
COUGH: 0
CONSTIPATION: 0
CHEST TIGHTNESS: 0
SHORTNESS OF BREATH: 0

## 2019-05-29 ASSESSMENT — PAIN SCALES - WONG BAKER
WONGBAKER_NUMERICALRESPONSE: 8

## 2019-05-29 ASSESSMENT — PAIN DESCRIPTION - LOCATION
LOCATION: ABDOMEN
LOCATION: ABDOMEN;HAND

## 2019-05-29 ASSESSMENT — PAIN DESCRIPTION - PAIN TYPE
TYPE: SURGICAL PAIN

## 2019-05-29 ASSESSMENT — PAIN DESCRIPTION - ORIENTATION
ORIENTATION: LEFT
ORIENTATION: LEFT

## 2019-05-29 ASSESSMENT — PAIN SCALES - GENERAL
PAINLEVEL_OUTOF10: 9
PAINLEVEL_OUTOF10: 10
PAINLEVEL_OUTOF10: 10
PAINLEVEL_OUTOF10: 9
PAINLEVEL_OUTOF10: 7
PAINLEVEL_OUTOF10: 10
PAINLEVEL_OUTOF10: 6
PAINLEVEL_OUTOF10: 9

## 2019-05-29 ASSESSMENT — PAIN DESCRIPTION - PROGRESSION
CLINICAL_PROGRESSION: NOT CHANGED

## 2019-05-29 ASSESSMENT — PAIN DESCRIPTION - FREQUENCY: FREQUENCY: INTERMITTENT

## 2019-05-29 ASSESSMENT — PAIN DESCRIPTION - DESCRIPTORS: DESCRIPTORS: ACHING

## 2019-05-29 ASSESSMENT — PAIN DESCRIPTION - ONSET: ONSET: GRADUAL

## 2019-05-29 NOTE — PROGRESS NOTES
Nutrition Assessment (Parenteral Nutrition)    Type and Reason for Visit: Reassess    Nutrition Recommendations: Continue Dental Soft diet, 100 mL of lipids and TPN at 65 mL/hr. Following changes made to TPN additives: NaCl: 70-85 mEq, K+ acetate: 40 to 65 mEq, add MVI and trace elements. Continue calorie count. Nutrition Assessment: Patient continues to receive adequate nutrition from lipids and TPN. PO intakes are minimal but starting to improved. Patient consumed about 150-200 kcals on 5/28 with only bites of food. Patient remains at risk for compromise due to inadequate oral intake. Recommend re-starting Vital AF 1.2 gerald (goal rate 55 mL/hr). Will monitor nutrition progression . Malnutrition Assessment:  · Malnutrition Status: At risk for malnutrition  · Context: Acute illness or injury  · Findings of the 6 clinical characteristics of malnutrition (Minimum of 2 out of 6 clinical characteristics is required to make the diagnosis of moderate or severe Protein Calorie Malnutrition based on AND/ASPEN Guidelines):  1. Energy Intake-Greater than 75% of estimated energy requirement, (Variable at times; over 75% of needs met for majority of admission)    2. Weight Loss-Unable to assess(edema present),    3. Fat Loss-Unable to assess(Pt is thin; edema present; no known recent loss of muscle or fat),    4. Muscle Loss-Unable to assess,    5. Fluid Accumulation-Moderate to severe fluid accumulation, Extremities, Generalized  6.   Strength-Not measured    Nutrition Risk Level: High    Nutrient Needs:  · Estimated Daily Total Kcal: 2663-0799 based on 35-39 kcal per kg of usual body wt  · Estimated Daily Protein (g): 63-68 based on 1.4-1.5 gm/kg IBW(revised)    Nutrition Diagnosis:   · Problem: Inadequate oral intake  · Etiology: related to Insufficient energy/nutrient consumption, Alteration in GI function     Signs and symptoms:  as evidenced by Intake 0-25%, Nutrition support - PN    Objective Information:  · Nutrition-Focused Physical Findings: +1 pitting general edema, +3 pitting LUE  · Wound Type: Deep Tissue Injury, Multiple, Pressure Ulcer, Stage I, Stage II, Surgical Wound  · Current Nutrition Therapies:  · Oral Diet Orders: Dental Soft   · Oral Diet intake: 1-25%  · Oral Nutrition Supplement (ONS) Orders: None  · ONS intake: NPO  · Parenteral Nutrition Orders:  · Type and Formula: Premix Central, 2-in-1 Custom   · Lipids: Daily, 100ml  · Rate/Volume: 65/1560 ml daily  · Duration: Continuous  · Current PN Order Provides: 1573 kcal, 78 gm pro  · Goal PN Orders Provides:  Total nutrition support goal: 6799-8665 kcal, 63-68 gm protein  · Additional Calories: None  · Anthropometric Measures:  · Ht: 5' (152.4 cm)   · Current Body Wt: 122 lb (55.3 kg)  · Admission Body Wt: 102 lb (46.3 kg)  · Usual Body Wt: 89 lb 1.1 oz (40.4 kg)(3-30-19)  · Ideal Body Wt: 100 lb (45.4 kg), % Ideal Body 89% (based on wt 5-12)  · BMI Classification: BMI 18.5 - 24.9 Normal Weight    Nutrition Interventions:   Continue current diet, Modify current Parenteral Nutrition, Continue Calorie Count  Continued Inpatient Monitoring    Nutrition Evaluation:   · Evaluation: Progressing toward goals   · Goals: Adequate nutritional intake or provision    · Monitoring: Nutrition Progression, Weight, Pertinent Labs, Monitor Bowel Function, PN Tolerance, PN Intake, Skin Integrity, I&O      Lakeshia DAVIDSON, R.D, L.D,  Clinical Dietitian  Cell # [phone]  Office # [phone]

## 2019-05-29 NOTE — PROGRESS NOTES
General Surgery Progress Note            PATIENT NAME: Winston Martinez     TODAY'S DATE: 5/29/2019, 2:10 PM    SUBJECTIVE:    Pt  Seen and examined. C/O pain. OBJECTIVE:   VITALS:  BP (!) 141/61   Pulse 87   Temp 99 °F (37.2 °C) (Oral)   Resp 22   Ht 5' (1.524 m)   Wt 122 lb 5.7 oz (55.5 kg)   SpO2 95%   BMI 23.90 kg/m²      INTAKE/OUTPUT:      Intake/Output Summary (Last 24 hours) at 5/29/2019 1410  Last data filed at 5/29/2019 0929  Gross per 24 hour   Intake 1075 ml   Output 615 ml   Net 460 ml                 CONSTITUTIONAL:  awake and alert. No acute distress  HEART:   Regular   LUNGS:   Clear but diminished  ABDOMEN:   Abdomen soft, mildly tender, non-distended. +BS. +BM (incontinent of stool and urine). PEG site clean and dry. Incision intact.  SQ drain nonfunctional as it was accidentally pulled out  EXTREMITIES:   1+ edema    Data:  CBC with Differential:    Lab Results   Component Value Date    WBC 12.3 05/29/2019    RBC 3.15 05/29/2019    RBC 3.66 05/23/2012    HGB 9.0 05/29/2019    HCT 27.3 05/29/2019     05/29/2019     05/23/2012    MCV 86.8 05/29/2019    MCH 28.7 05/29/2019    MCHC 33.1 05/29/2019    RDW 16.2 05/29/2019    METASPCT 1 05/25/2019    LYMPHOPCT 8 05/29/2019    MONOPCT 6 05/29/2019    MYELOPCT 2 05/25/2019    BASOPCT 0 05/29/2019    MONOSABS 0.80 05/29/2019    LYMPHSABS 0.90 05/29/2019    EOSABS 0.20 05/29/2019    BASOSABS 0.00 05/29/2019    DIFFTYPE NOT REPORTED 05/29/2019     BMP:    Lab Results   Component Value Date     05/29/2019    K 2.8 05/29/2019     05/29/2019    CO2 23 05/29/2019    BUN 12 05/29/2019    LABALBU 2.4 05/25/2019    LABALBU 4.6 05/17/2012    CREATININE <0.40 05/29/2019    CALCIUM 7.4 05/29/2019    GFRAA CANNOT BE CALCULATED 05/29/2019    LABGLOM CANNOT BE CALCULATED 05/29/2019    GLUCOSE 124 05/29/2019    GLUCOSE 87 05/21/2012         ASSESSMENT     Principal Problem:    Acute respiratory failure (HCC)  Active Problems: Sepsis due to urinary tract infection (HCC)    Cystitis    Emphysematous cystitis    Septic shock (HCC)    Leukemoid reaction    Aspiration pneumonia of right lower lobe due to vomit (HCC)    MRSA carrier    Urinary retention    Abdominal distention    Elevated CEA    Elevated CA 19-9 level    Cholecystitis    CRP elevated    Elevated procalcitonin    Bandemia    Centrilobular emphysema (HCC)    Hemorrhage of rectum and anus    GI bleed    Anemia    Small bowel obstruction (HCC)    Anxiety disorder    Noncompliance    Chronic obstructive pulmonary disease, unspecified ([facility]    Bacteremia due to coagulase-negative Staphylococcus    Bradycardia    Fever  Resolved Problems:    * No resolved hospital problems. *  S/P repair of fascial dehiscence    Plan  1. Restart tube feeding  2.  Up as tolerated        Nate Juarez, DO [address]

## 2019-05-29 NOTE — PROGRESS NOTES
[address]   INPATIENT OCCUPATIONAL THERAPY  PROGRESS NOTE  Date: 2019  Patient Name: Mono Membreno      Room: 1520/7442-45  MRN: [id number]    : 1950  (77 y.o.) Gender: female     Discharge Recommendations:  [address]       Referring Practitioner: Eveline Lynch D.O  Diagnosis: Sepsis due to UTI  General  Chart Reviewed: Yes  Patient assessed for rehabilitation services?: Yes  Referring Practitioner: Eveline Lynch D.O  Diagnosis: Sepsis due to UTI    Restrictions  Restrictions/Precautions: Fall Risk, Contact Precautions  Implants present? : Pacemaker  Other position/activity restrictions:  NG tube,Indwelling catheter, MORGAN drain,  R foot IV, RUE IV, L sided weakness from previous CVA      Subjective  Subjective: Pt reports her Left hand hurts with repositioning. (LUE remarkably swollen)  Comments: Pt pleasant. Daily she continues to demo progressive motivation and insight to therapy benefits. Continue to have loos bowels. Edema remarkable LUE. Patient Currently in Pain: Yes  Pain Level: 9  Pain Location: Abdomen;Hand  Pain Orientation: Left  Overall Orientation Status: Within Functional Limits  Patient Observation  Observations: Left U/E very swollen. (Reposition w/ pillows for elevation.)  Pain Assessment  Pain Assessment: 0-10  Pain Level: 9  Hammer-Baker Pain Rating: Hurts whole lot  Pain Type: Surgical pain  Pain Location: Abdomen, Hand  Pain Orientation: Left  Pain Descriptors: Aching  Clinical Progression: Not changed  Response to Pain Intervention: Patient Satisfied    Objective     Bed mobility  Supine to Sit: Maximum assistance  Sit to Supine: Maximum assistance  Scooting: Dependent/Total;2 Person assistance(To head of bed, and positioned on back.)  Comment: assessed tx prior; did not occur this date  Balance  Sitting Balance: Dependent/Total(unsupported)  Standing Balance: Unable to assess(comment)         ADL  Feeding: Setup(if placed on R side pt able to complete c setup)  Grooming: Stand by assistance;Minimal assistance(able to wash face/complete oral care c RUE; A c hair )  UE Bathing: Moderate assistance(requires A c RUE/axillary)  LE Bathing: Moderate assistance;Maximum assistance(A below the knees/buttocks/kimmy)  UE Dressing: Maximum assistance; Moderate assistance(hospital gown and compression sleeve (sleeve: LUE))  LE Dressing: Maximum assistance(can A c AROM RLE)  Toileting: Dependent/Total(continues to be limited by incontinent episodes)  Additional Comments: ADLs reported with upright positioning in bed; pt would require seated balance A and increased A c all tasks if completed seated unsupported. Pt has not been assessed for standing post sx. Limited by LUE flaccidity (prior stroke), decreased strength and mobility and overall deconditioning. Pt continues to demo increased motivation and tolerance daily although limited by medical complications/diet modifications. Per Clinical reasoning/pt report. Type of ROM/Therapeutic Exercise  Type of ROM/Therapeutic Exercise: PROM  Comment: LUE digits/wrist for edema reduction x5, moderate pain noted requiring restbreaks        Positioning  Adaptations: LUE Tetra  Level E applied from DIP joints to elbow crease to A c edema reduction; pt not able to tolerate Level C this date but remained with patient and educated on reassessing edema next tx and applying once appropriate. Pt familiar c skin/positioning checks and V she will complete throughout the day. Will continue to modify and adjust appropriately. Pt declined edema wraps to digits; LLE elevated c wash cloth placed in grasp for skin protection (edema increasing digit flexion). RN and following CALIX informed of adapatations. Assessment  Performance deficits / Impairments: Decreased functional mobility ; Decreased ADL status; Decreased ROM; Decreased strength;Decreased endurance;Decreased coordination  Assessment: LUE edema increase; trial compression glove next tx  Prognosis: Fair  Discharge Recommendations: Subacute/Skilled Nursing Facility  Activity Tolerance: Patient limited by fatigue;Patient limited by pain  Activity Tolerance: Pain w PROM lt UE. Safety Devices in place: Yes  Type of devices: Call light within reach; Left in bed;Patient at risk for falls             Patient Education:  Patient Education: edema reduction, positioning  Learner:patient  Method: demonstration and explanation       Outcome: needs reinforcement     Plan  Safety Devices  Safety Devices in place: Yes  Type of devices: Call light within reach, Left in bed, Patient at risk for falls  Plan  Times per week: 5  Times per day: Daily  Current Treatment Recommendations: ROM, Strengthening, Patient/Caregiver Education & Training, Self-Care / ADL, Safety Education & Training, Functional Mobility Training, Equipment Evaluation, Education, & procurement(coordination)  Plan Comment: continue OT      Goals  Short term goals  Time Frame for Short term goals: by discharge  Short term goal 1: pt will participate in 15-20 minutes bilateral UE therapeutic exercises to improve rt UE strength,increase lt UE (rom,coordination,decrease swelling)  Short term goal 2: pt will demonstrates increase indpendence w ADLS (needing min assist w grooming, UB bathing and dressing) using pacing/ECWS technique  Short term goal 3: Improve bed mobility for selfcare activities  Short term goal 4: Educate pt in ECWS techniques for selfcare  Short term goal 5: Assess safety w transfers  & progress to lower body ADLS when pt is appropriate    OT Individual Minutes  Time In: 1401  Time Out: 1430  Minutes: 29      Electronically signed by KORINA Mejia on 5/29/19 at 4:06 PM

## 2019-05-29 NOTE — PROGRESS NOTES
Dr. Galvez Crews notified via perfect serve of removal of ash drain. Orders to just cover site. No further interventions needed. Will continue to monitor site.

## 2019-05-29 NOTE — PROGRESS NOTES
Spoke on the phone with Dr. Adelaida Noriega, notified of CXR results. No new orders at this time, will see pt tomorrow.

## 2019-05-29 NOTE — PLAN OF CARE
Problem: Risk for Impaired Skin Integrity  Goal: Tissue integrity - skin and mucous membranes  Description  Structural intactness and normal physiological function of skin and  mucous membranes. 5/29/2019 1746 by Cherylene Roses, RN  Outcome: Ongoing  Note:   Pt skin integrity remained intact, no new alterations noted. Head to toe completed, see chart assessment. Waffle mattress in place. Turned Q2 hours  5/29/2019 0457 by Kaitlin Moran RN  Outcome: Ongoing     Problem: Falls - Risk of:  Goal: Will remain free from falls  Description  Will remain free from falls  5/29/2019 1746 by Cherylene Roses, RN  Outcome: Ongoing  Note:   Pt remained absent from falls. Call light within reach. Bed locked and in lowest position. 5/29/2019 0457 by Kaitlin Moran RN  Outcome: Ongoing  Goal: Absence of physical injury  Description  Absence of physical injury  5/29/2019 0457 by Kaitlin Moran RN  Outcome: Ongoing     Problem: Infection, Septic Shock:  Goal: Will show no infection signs and symptoms  Description  Will show no infection signs and symptoms  5/29/2019 1746 by Cherylene Roses, RN  Outcome: Ongoing  5/29/2019 0457 by Kaitlin Moran RN  Outcome: Ongoing     Problem: Pain:  Goal: Pain level will decrease  Description  Pain level will decrease  5/29/2019 1746 by Cherylene Roses, RN  Outcome: Ongoing  Note:   Pain assessed with each interaction. Meds given, see MAR. Will continue to monitor.    5/29/2019 0457 by Kaitlin Moran RN  Outcome: Ongoing  Goal: Control of acute pain  Description  Control of acute pain  5/29/2019 0457 by Kaitlin Moran RN  Outcome: Ongoing  Goal: Control of chronic pain  Description  Control of chronic pain  5/29/2019 0457 by Kaitlin Moran RN  Outcome: Ongoing

## 2019-05-29 NOTE — FLOWSHEET NOTE
Patient appeared more alert and even smiled at times  She expressed no spiritual care needs but smiled when writer said she would come back     05/29/19 3973   Encounter Summary   Services provided to: Patient   Referral/Consult From: Palliative Care   Continue Visiting   (5/29/19)   Complexity of Encounter Moderate   Length of Encounter 15 minutes   Routine   Type Follow up   Assessment Approachable  (Sitting up in bed)   Intervention Active listening;Explored feelings, thoughts, concerns;Sustaining presence/ Ministry of presence   Outcome Acceptance;Receptive

## 2019-05-29 NOTE — PROGRESS NOTES
[address] [facility].    PROGRESS NOTE             5/29/2019    11:25 AM    Name:   Loraine Carson  MRN:     [id number]     Acct:      [de-identified]   Room:   2048/2048-01  IP Day:  50  Admit Date:  4/11/2019  2:48 PM    PCP:  Roxana Roper DO  Code Status:  Full Code    Subjective: Interval History Status: improved. Patient seen and examined at bedside in the ICU. Afebrile, VSS. Patient appears better today visually. Alert and awake. Overnight apparently the patient was found pulling at lines. Mitts were placed accordingly and was redirected. RUQ pain has significantly decreased - HIDA scan yesterday was not able to visualize the GB. WBC trending down. Afebrile. Tachycardic into the 150's overnight, required Amiodarone drip and an increase in PRN lopressor. This AM HR <102, SBP controlled. I do not believe she is medically stable enough still at this time for the OR. Repeat Blood cx -ve @ 3 days    CXR stable in interval.     Prealbumin 11.8       5/28    pst op long icu course     S/p open ashley, ex lap, R colectomy w/ ileocolic anastomosis, excision small bowel diverticulum, extensive enterolysis. Intubated on vent, low pressor requirement, worsening edema, on solumedrol and albumin support. Extubated on 5/20. Edema improving, feeding transitioning to NGT.          5/29 still has abd pain      Low grade fever    u/a neg    no nuasea/ vomiting    eating a lttle   Brief History:     Per EMR:     The patient is a 77 y.o.  Female who presents withAbdominal Pain   and she is admitted to the hospital for the management of abdominal distension, nausea, vomiting, and acute cystitis.      Patient presents with chills, nausea, vomiting, abdominal pain (diffuse, but worse in the suprapubic region), abdominal distension, and dysuria of 2-3 days duration. Denies hematemesis.  Acknowledges difficulty keeping food down but tolerating mometasone-formoterol  2 puff Inhalation BID     Continuous Infusions:    PN-Adult 2-in-1 Central Line (Standard) 65 mL/hr at 19 1718    dextrose 5 % and 0.45 % NaCl 25 mL/hr at 19 1123    dextrose       PRN Meds: acetaminophen, fentanNYL **OR** fentanNYL, LORazepam, glucose, dextrose, glucagon (rDNA), dextrose, sodium phosphate IVPB **OR** sodium phosphate IVPB, sodium chloride flush, ondansetron, potassium chloride, magnesium sulfate    Data:     Past Medical History:   has a past medical history of Abnormal computed tomography of cervical spine, CVA (cerebral vascular accident) ([address] GERD (gastroesophageal reflux disease), Hypertension, Paraproteinemia, and Weight loss. Social History:   reports that she has been smoking. She has a 30.00 pack-year smoking history. She has never used smokeless tobacco. She reports that she drank about 16.8 oz of alcohol per week. She reports that she does not use drugs. Family History: History reviewed. No pertinent family history. Vitals:  BP (!) 141/61   Pulse 87   Temp 99 °F (37.2 °C) (Oral)   Resp 22   Ht 5' (1.524 m)   Wt 122 lb 5.7 oz (55.5 kg)   SpO2 95%   BMI 23.90 kg/m²   Temp (24hrs), Av.7 °F (37.1 °C), Min:97.7 °F (36.5 °C), Max:99.6 °F (37.6 °C)    Recent Labs     19  1631 19  2033 19  0024 19  0645   POCGLU 121* 106* 101 123*       I/O(24Hr):     Intake/Output Summary (Last 24 hours) at 2019 1125  Last data filed at 2019 8370  Gross per 24 hour   Intake 1075 ml   Output 1140 ml   Net -65 ml       Labs:    [unfilled]    Lab Results   Component Value Date/Time    SPECIAL R AC 5CC PURPLE 3CC RED 2019 12:20 PM     Lab Results   Component Value Date/Time    CULTURE NO GROWTH 6 DAYS 2019 12:20 PM       [unfilled]    Radiology:    Orvan Fail Femur Left (min 2 Views)    Result Date: 3/27/2019  EXAMINATION: 4 XRAY VIEWS OF THE LEFT FEMUR; 2 XRAY VIEWS OF THE LEFT KNEE; 3 XRAY VIEWS OF THE LEFT TIBIA AND FIBULA 3/27/2019 7:00 pm COMPARISON: Left hip 11/14/2015. HISTORY: ORDERING SYSTEM PROVIDED HISTORY: pain TECHNOLOGIST PROVIDED HISTORY: pain Ordering Physician Provided Reason for Exam: pt twisted wrong, lt knee pain, unable to straighten knee. Acuity: Acute Type of Exam: Initial FINDINGS: Left femur, four views: The bones are diffusely osteopenic. No acute fracture deformity. The hip joint is maintained. The femoral head projects within the acetabulum. No focal soft tissue abnormality is seen. Left knee, two views: The knee is flexed. No acute osseous abnormality. No joint effusion. Joint spaces are not optimally profiled but no significant arthritic changes are apparent. Left tib-fib, three views: There is evidence of a remote healed distal tibia fracture. No acute fracture or dislocation is seen. No focal soft tissue abnormality. No acute osseous abnormality of the left femur. No acute osseous abnormality of the left knee. No acute osseous abnormality of the left tib-fib. Healed remote distal tibia fracture. Marked osteopenia, likely due to disuse. Xr Knee Left (1-2 Views)    Result Date: 3/27/2019  EXAMINATION: 4 XRAY VIEWS OF THE LEFT FEMUR; 2 XRAY VIEWS OF THE LEFT KNEE; 3 XRAY VIEWS OF THE LEFT TIBIA AND FIBULA 3/27/2019 7:00 pm COMPARISON: Left hip 11/14/2015. HISTORY: ORDERING SYSTEM PROVIDED HISTORY: pain TECHNOLOGIST PROVIDED HISTORY: pain Ordering Physician Provided Reason for Exam: pt twisted wrong, lt knee pain, unable to straighten knee. Acuity: Acute Type of Exam: Initial FINDINGS: Left femur, four views: The bones are diffusely osteopenic. No acute fracture deformity. The hip joint is maintained. The femoral head projects within the acetabulum. No focal soft tissue abnormality is seen. Left knee, two views: The knee is flexed. No acute osseous abnormality. No joint effusion. Joint spaces are not optimally profiled but no significant arthritic changes are apparent.  Left tib-fib, three views: There is evidence of a remote healed distal tibia fracture. No acute fracture or dislocation is seen. No focal soft tissue abnormality. No acute osseous abnormality of the left femur. No acute osseous abnormality of the left knee. No acute osseous abnormality of the left tib-fib. Healed remote distal tibia fracture. Marked osteopenia, likely due to disuse. Xr Knee Left (3 Views)    Result Date: 3/30/2019  EXAMINATION: 3 XRAY VIEWS OF THE LEFT KNEE 3/30/2019 10:58 am COMPARISON: March 27, 2018 HISTORY: ORDERING SYSTEM PROVIDED HISTORY: F/u L knee pain after cast TECHNOLOGIST PROVIDED HISTORY: AP, oblique, lateral F/u L knee pain after cast FINDINGS: Marked osteopenia. Interval casting, which degrades fine osseous detail question cortical offset in the lateral femoral metaphysis. No malalignment identified. No significant joint effusion. Interval casting. Question nondisplaced fracture in the lateral femoral metaphysis. Osteopenia. Xr Tibia Fibula Left (2 Views)    Result Date: 3/27/2019  EXAMINATION: 4 XRAY VIEWS OF THE LEFT FEMUR; 2 XRAY VIEWS OF THE LEFT KNEE; 3 XRAY VIEWS OF THE LEFT TIBIA AND FIBULA 3/27/2019 7:00 pm COMPARISON: Left hip 11/14/2015. HISTORY: ORDERING SYSTEM PROVIDED HISTORY: pain TECHNOLOGIST PROVIDED HISTORY: pain Ordering Physician Provided Reason for Exam: pt twisted wrong, lt knee pain, unable to straighten knee. Acuity: Acute Type of Exam: Initial FINDINGS: Left femur, four views: The bones are diffusely osteopenic. No acute fracture deformity. The hip joint is maintained. The femoral head projects within the acetabulum. No focal soft tissue abnormality is seen. Left knee, two views: The knee is flexed. No acute osseous abnormality. No joint effusion. Joint spaces are not optimally profiled but no significant arthritic changes are apparent. Left tib-fib, three views: There is evidence of a remote healed distal tibia fracture.   No acute fracture or dislocation is seen. No focal soft tissue abnormality. No acute osseous abnormality of the left femur. No acute osseous abnormality of the left knee. No acute osseous abnormality of the left tib-fib. Healed remote distal tibia fracture. Marked osteopenia, likely due to disuse. Ct Abdomen Pelvis W Iv Contrast    Result Date: 4/11/2019  EXAMINATION: CT OF THE ABDOMEN AND PELVIS WITH CONTRAST 4/11/2019 5:14 pm TECHNIQUE: CT of the abdomen and pelvis was performed with the administration of intravenous contrast. Multiplanar reformatted images are provided for review. Dose modulation, iterative reconstruction, and/or weight based adjustment of the mA/kV was utilized to reduce the radiation dose to as low as reasonably achievable. COMPARISON: None. HISTORY: ORDERING SYSTEM PROVIDED HISTORY: Abdominal pain TECHNOLOGIST PROVIDED HISTORY: IV Only Contrast Ordering Physician Provided Reason for Exam: patient c/o abd pain for an hour FINDINGS: Lower Chest: Trace pleural fluid bilaterally is noted. Indwelling cardiac pacemaker is present. Heart size is normal.  Coronary artery calcifications are evident. The esophagus is significantly dilated and fluid-filled. No paraesophageal adenopathy is evident. Organs: The spleen, pancreas, and adrenals are unremarkable. The liver contains hypodensities, likely cysts. The gallbladder is distended and contains a solitary gallstone. The kidneys excrete contrast bilaterally. Extrarenal collecting systems are noted. The ureters are mildly dilated down to the urinary bladder without evidence of intraluminal or ureteral obstructing calculi. GI/Bowel: Marked distention of the stomach is noted; this continues to the pylorus with appearance suggesting partial gastric outlet obstruction. Fluid is present in some small bowel loops and colon distal to the stomach. No small bowel obstruction. Pelvis: Marked distention of the urinary bladder is noted.   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 Distended ureters may be related to reflux or infection. No free pelvic fluid, pelvic or inguinal adenopathy is noted. Peritoneum/Retroperitoneum: No aortic aneurysm. Mild to moderate plaque is noted in the infrarenal abdominal aorta and both proximal iliac arteries. Shotty lymph nodes are present around the aorta in the upper abdomen. No mesenteric adenopathy is noted. Bones/Soft Tissues: Degenerative changes are present in the hips and lower lumbar facets. Estimated biologic radiation dose for this procedure:258.77 mGy/cm2.     1. Dilatation of the thoracic esophagus filled with fluid. No obstructive process is noted. No adjacent enlarged lymph nodes. 2. Trace pleural fluid. 3. Marked distention of the stomach. This appears to extend to the pylorus. Partial gastric outlet obstruction is not excluded. 4. Bilateral dilated ureters without obstructing calculi. Urinary bladder is markedly distended with bladder wall air suggesting emphysematous cystitis. Dilatation of the ureters may be related to reflux or infection. 5. Atherosclerotic disease. 6. Other findings as above. Critical results were called by Dr. Cathie Carey MD to [address] on 4/11/2019 at 17:37. Xr Chest Portable    Result Date: 4/12/2019  EXAMINATION: SINGLE XRAY VIEW OF THE CHEST 4/12/2019 5:26 am COMPARISON: November 14, 2015. HISTORY: ORDERING SYSTEM PROVIDED HISTORY: line placement TECHNOLOGIST PROVIDED HISTORY: line placement Ordering Physician Provided Reason for Exam: New right side line placement. Acuity: Acute Type of Exam: Initial Additional signs and symptoms: New right side line placement. FINDINGS: Stable left pectoral trans venous cardiac pacer device. New right IJ central venous catheter with tip near the superior atrial caval junction. Normal lung volume. No new consolidation.   Curvilinear radiopacity projecting over the right upper lobe likely represents artifact from a skin fold. No pleural effusion or pneumothorax. Stable cardiomediastinal silhouette and great vessels with redemonstration of atherosclerotic thoracic aorta. New right IJ central venous catheter with tip near the superior atrial caval junction. No pneumothorax. No new consolidation. Physical Examination:        Physical Exam   Constitutional: She appears well-developed. She appears cachectic. She appears ill. No distress. Intubated   HENT:   Head: Normocephalic and atraumatic. Eyes: Pupils are equal, round, and reactive to light. Conjunctivae and EOM are normal.   Neck: Normal range of motion. Right IJ central line in place. Site clean and dry. Cardiovascular: Normal rate, regular rhythm, normal heart sounds and intact distal pulses. Exam reveals no gallop and no friction rub. No murmur heard. Pulmonary/Chest: Effort normal. No stridor. No respiratory distress. She has no wheezes. She has no rales. Intubated and mechanically ventilated. Abdominal: Soft. Bowel sounds are normal. She exhibits no mass. There is tenderness (Decreased tenderness to light palpation. Improvement from prior. ). There is guarding. There is no rebound. RUQ tenderness    Musculoskeletal: Normal range of motion. She exhibits no edema (Anasarca; 3+ b/l pitting edema in upper and lower ext. ). Left knee wrapped in dressing. Posterior bruising noted. Neurological:   Intubated, sedated   Skin: Skin is warm and dry. Capillary refill takes less than 2 seconds. She is not diaphoretic. No pallor. Nursing note and vitals reviewed.     Assessment:        Primary Problem  Acute respiratory failure [facility])    Active Hospital Problems    Diagnosis Date Noted    Bradycardia [R00.1]     Bacteremia due to coagulase-negative Staphylococcus [R78.81]     Chronic obstructive pulmonary disease, unspecified ([facility] [J44.9] 05/24/2019    Acute respiratory failure ([facility] [J96.00] 05/18/2019    Small bowel obstruction ([facility] [H09.111]     Anxiety disorder [F41.9]     Noncompliance [Z91.19]     Anemia [D64.9]     GI bleed [K92.2] 05/03/2019    Hemorrhage of rectum and anus [K62.5]     Centrilobular emphysema ([facility] [J43.2] 04/30/2019    CRP elevated [R79.82]     Elevated procalcitonin [R79.89]     Bandemia [D72.825]     Cholecystitis [K81.9]     Abdominal distention [R14.0]     Elevated CEA [R97.0]     Elevated CA 19-9 level [R97.8]     Urinary retention [R33.9]     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]     Septic shock ([facility] [A41.9, R65.21]     Leukemoid reaction [D72.823]     Aspiration pneumonia of right lower lobe due to vomit ([facility] [J69.0]     MRSA carrier [Z22.322]     Sepsis due to urinary tract infection ([facility] [A41.9, N39.0] 04/11/2019    Cystitis [N30.90] 04/11/2019       Plan:             cholecystitis s/pex-lap, open cholecystectomy, right colectomy w/ ileocolic anastomosis, extensive enterolysis, POD#11  - ID following - Flagyl, Cipro   - Surgery following - soft diet, s/p G-tube placement on 5/25 w/ dehiscence repair, tube feed 10ml (do not advance), Reglan 10mg q6hrs  - Pulm following - Duoneb, Dulera, O2 PRN         Anemia 2/2 ABLA vs. AOCD, multifactorial  - Hgb 6.9 (from 7.3), transfuse 1U pRBC  - H+H q8hrs, transfuse < 7  - Venofer 200mg qd x 5 doses      Paroxysmal A.fib - resolved   - Amio 100mg BID     Hypothyroid  - TSH - 79  - Levothyroxine increased to 75, will continue to titrate up     Severe malnutrition  - Encourage PO intake  - C/w tube feeds per G tube  - Mirtazapine qhs      Hypokalemia  - K 2.9  - replace, 60meq total  - f/u repeat BMP     DVT PPx  - Lovenox     Dispo  - PT/OT  - SW following - pending [facility] ([id number], Dr. Anuradha Tony) vs. [facility] Frames       5/28   transfused 5/27   hb 6.9   today 8.7      Not tolerating tf   clamped    On tpn   tolerating po            Clamp  g tube   decrease tpn    Increase po nutritional support   follow hb      afib    vr 100    bp 146    Add lopressor   on amioderone po      k 3.7   cr nl      5/29      Post op ashley   low grade fever   still has a lot aof abd pain     [poor oral intake   on tpn   g tube clamped    Tolerating some po intake      kub   neg   ua neg       check cxr      afib vr 89     140/80     increase lopressor 50 mg bid   on Hershell Scarlett Kapoor MD  5/29/2019  11:25 AM   Sara Kapoor  Electronically signed by Tang Nowak MD on 5/29/2019 at 11:25 AM

## 2019-05-29 NOTE — PROGRESS NOTES
Pulmonary Progress Note  [facility]      Patient - Elo Worley,  Age - 77 y.o.    - 1950      Room Number - 9201/5216-26   N -  [id number]   [facility] # - [de-identified]  Date of Admission -  2019  2:48 PM        Consulting Lewis Granados MD  Primary Care Physician - Brandy Winchester, DO     SUBJECTIVE   on, 2 L  Denies any fever or chills  No chest pain, short of breath or cough  Some abdominal pain, nausea, + BM    OBJECTIVE   VITALS    height is 5' (1.524 m) and weight is 122 lb 5.7 oz (55.5 kg). Her oral temperature is 99 °F (37.2 °C). Her blood pressure is 141/61 (abnormal) and her pulse is 87. Her respiration is 22 and oxygen saturation is 95%. Body mass index is 23.9 kg/m². Temperature Range: Temp: 99 °F (37.2 °C) Temp  Av.7 °F (37.1 °C)  Min: 97.7 °F (36.5 °C)  Max: 99.6 °F (37.6 °C)  BP Range:  Systolic (22KUL), MPL:750 , Min:107 , SJU:462     Diastolic (29GXJ), CZW:60, Min:45, Max:70    Pulse Range: Pulse  Av.4  Min: 83  Max: 99  Respiration Range: Resp  Av.3  Min: 18  Max: 22  Current Pulse Ox[de-identified]  SpO2: 95 %  24HR Pulse Ox Range:  SpO2  Av.1 %  Min: 93 %  Max: 99 %  Oxygen Amount and Delivery: O2 Flow Rate (L/min): 2 L/min    Wt Readings from Last 3 Encounters:   19 122 lb 5.7 oz (55.5 kg)   19 89 lb (40.4 kg)   19 94 lb 1.6 oz (42.7 kg)       I/O (24 Hours)    Intake/Output Summary (Last 24 hours) at 2019 1210  Last data filed at 2019 0929  Gross per 24 hour   Intake 1075 ml   Output 1140 ml   Net -65 ml       EXAM     General Appearance  Awake, alert,  in no acute distress  HEENT - normocephalic, atraumatic.    Neck - no JVD,  trachea midline   Lungs - decreased sounds, coarse, no wheezing or distress  Cardiovascular - Heart sounds are normal.  regular rate and rhythm   Abdomen - Soft, little tender, nondistended,   Neurologic - looks comfortable, Date    PHOS 2.0 05/29/2019        LIVER PROFILE No results for input(s): AST, ALT, LIPASE, BILIDIR, BILITOT, ALKPHOS in the last 72 hours. Invalid input(s): AMYLASE,  ALB  INR No results for input(s): INR in the last 72 hours. PTT No results for input(s): APTT in the last 72 hours. BNP No results for input(s): BNP in the last 72 hours.     RADIOLOGY     (See actual reports for details)    ASSESSMENT/PLAN   Principal Problem:    Acute respiratory failure ([address]  Active Problems:    Emphysematous cystitis    Hemorrhage of rectum and anus    Small bowel obstruction (HCC)    Severe COPD  Acute cholecystitis/cholecystostomy tube 4/22 then had ex lap 5/16 with  extensive lysis of adhesions, right colectomy with anastomosis and open cholecystectomy, found to have cholecystostomy tube traversing through the transverse colon  E. coli UTI/aspiration pneumonia /   History of CVA with left hemiparesis  Anxiety/depression  Recent GI bleed  Paroxysmal A. Fib/hypertension  Acute anemia     Plan:     O2 as needed  Bronchodilators  Protonix    Lovenox held because of anemia  Off Antibiotic as per ID,       Electronically signed by Lissa Weller MD on 5/29/2019 at 12:10 PM

## 2019-05-29 NOTE — PROGRESS NOTES
RODGER informed that [facility] got denied again.  said if the MD will do a peer to peer by 1500 tomorrow there is a chance pt will be approved. Meng Cabello is the . [phone]. MD said he will not complete p2p. RODGER has [address] [facility] assessing pt. Addendum: Tai Alvarez started pre-cert this day.

## 2019-05-29 NOTE — PROGRESS NOTES
Physical Therapy  Facility/Department: [facility] MED SURG  Daily Treatment Note  NAME: Libby Issa  : 1950  MRN: [id number]    Date of Service: 2019    Discharge Recommendations:  (P) ECF with PT        Patient Diagnosis(es): The primary encounter diagnosis was Urinary retention. Diagnoses of Emphysematous cystitis, Septicemia ([address] and Small bowel obstruction ([address] were also pertinent to this visit. has a past medical history of Abnormal computed tomography of cervical spine, CVA (cerebral vascular accident) ([address] GERD (gastroesophageal reflux disease), Hypertension, Paraproteinemia, and Weight loss. has a past surgical history that includes pacemaker placement (2011); intubation (2019); Upper gastrointestinal endoscopy (N/A, 2019); laparoscopy (N/A, 5/15/2019); Cholecystectomy (N/A, 5/15/2019); Abdomen surgery (N/A, 2019); and Gastrostomy tube placement (N/A, 2019). Restrictions  Restrictions/Precautions  Restrictions/Precautions: (P) Fall Risk, Contact Precautions  Implants present? : (P) Pacemaker  Position Activity Restriction  Other position/activity restrictions:  NG tube,Indwelling catheter, MORGAN drain,  R foot IV, RUE IV, L sided weakness from previous CVA  Subjective   Subjective  Subjective: (P) Pt required much encouragement to allow therapist to ex all exts. Pt refused to attempt to sit @ the EOB. Pt stated that she was fatigued. P.T was interrupted x 2 for xray and physician. General Comment  Comments: (P) Pt wanted someone to put her in a w/c so she could go out to smoke. . Therapist convince pt that would not be a good idea, especially since she's healing from major surgery.    Pain Assessment  Pain Assessment: (P) 0-10  Pain Level: (P) 9  Pain Type: (P) Surgical pain  Pain Location: (P) Abdomen  Pain Orientation: (P) Left  Non-Pharmaceutical Pain Intervention(s): (P) Repositioned(for comfort)  Patient Observation  Observations: (P) Left U/E very swollen. (Reposition w/ pillows for elevation.)       Orientation     Cognition      Objective                  Other exercises  Other exercises?: (P) Yes  Other exercises 1: (P) PROM all exts supine, pt c/o pain in Left U/E, increased swelling. Other exercises 2: (P) Bed mobility  Other exercises 3: (P) Pt reposition for comfort                        Assessment   Body structures, Functions, Activity limitations: (P) Decreased endurance;Decreased functional mobility ; Decreased strength;Decreased balance;Decreased ROM; Decreased safe awareness  Assessment: (P) Treatment limited due to patients high anxiety due to abdominal pain. Treatment Diagnosis: (P) weakness all 4's difficulty walking  Specific instructions for Next Treatment: (P) progress to mobility as able  Prognosis: (P) Fair  Patient Education: (P) Circulation/LE ROM exercises, pursed lip breathing technique for relaxation/pain control, supine<>sit transfer, and importance of OOB activity to prevent further pneumonia and effects of bed rest.  REQUIRES PT FOLLOW UP: (P) Yes  Activity Tolerance  Activity Tolerance: (P) Patient limited by pain; Patient limited by fatigue;Patient limited by endurance  Activity Tolerance: (P) Pt in high pain with movement of left knee, pt resistance to ROM.      G-Code     OutComes Score                                                  AM-PAC Score             Goals  Short term goals  Time Frame for Short term goals: (P) 10 visits  Short term goal 1: (P) improve strength RUE/RLE to 4/5 to assist in bed mobility and transfers  Short term goal 2: (P) improve bed mobility to minx1  Short term goal 3: (P) improve transfers to minx1  Short term goal 4: (P) progress to Gait and/or use of wheelchair for mobility  Patient Goals   Patient goals : (P) return home    Plan    Plan  Times per week: (P) 5-7x/wk  Times per day: (P) Daily  Specific instructions for Next Treatment: (P) progress to mobility as able  Current Treatment Recommendations: (P) ROM, Strengthening, Functional Mobility Training, Transfer Training, Balance Training, Endurance Training, Wheelchair Mobility Training  Plan Comment: (P) to continue PT per POC  Safety Devices  Type of devices: (P) Left in bed, Bed alarm in place, All fall risk precautions in place  Restraints  Initially in place: No  Restraints: B wrist restraint     Therapy Time   Individual Concurrent Group Co-treatment   Time In 1315/1335         Time Out 1329/1350         Minutes [address] [place] [address]   Electronically signed by Wendy Sandra PTA on 5/29/2019 at 2:48 PM

## 2019-05-29 NOTE — PLAN OF CARE
Safety maintained. Turned and repostioned q2hr  periarea reddened with bruised area noted to rt upper buttock. Medicated as ordered for c/o abdominal pain. Pt remains disoriented.

## 2019-05-29 NOTE — PROGRESS NOTES
Infectious disease Consult Note      Patient: Angelika Jay  : 1950  Acct#:  [id number]     Date:  2019    Assessment:   Leukocytosis and low-grade fever  . Aspiration pneumonia treated  . Shock resolved . Cholecystitis status post cholecystectomy tube  grew Candida non-albicans and one colony of ESBL Klebsiella on culture . SBO S/p right colectomy with ileocolic anastomosis,open cholecystectomy and excision of small bowel diverticulum 5/15 . Single positive blood culture on 5/10 STAPHYLOCOCCUS SPECIES, COAGULASE NEGATIVE likely contamination     Ecoli Emphysematous cystitis initially treatedtreated     Aspiration pneumonia of right lower lobe initially     Yeast growth on sputum culture suspect contamination     MRSA carrier    Urinary retention     Anemia GI bleed followed by GI     PCN allergy      History of CVA with left hemiparesis              Recommendations:   Monitor off ABXS   Chest x-ray and urinalysis. Pro calcitonin level and lactic acid. Further workup and possible starting antibiotics if persistent fever  Follow CBC and renal function  . Continue supportive care . Subjective:       History of Present Illness  Patient is a 77 y.o.  female admitted with Sepsis due to urinary tract infection   who is seen in consult for the same . She presented w dysuria and lower abd pain for around a week PRIOR TO ADMISSION associated with vomiting ,was found hypotensive with leukocytosis . CT suggested emphysematous cystitis,possible gastric outlet obstruction. CXR showed a right lower infiltrate. High CA-19-9,CEA  Allergy to [facility] w SOB   Urine culture  grew ESCHERICHIA COLI resistant to Ampicillin ,sensitive to all other tested ABXS . Blood cultures negative . Mycoplasma IGM 0.97   YEAST MODERATE GROWTH on sputum culture   S/P EGD   with reported esophagitis. GB US Findings suggesting acute cholecystitis. HIIDA showed absent gallbladder filling.    She was extubated on 4/19  Status post cholecystectomy tube 4/22 by interventional radiology,  cultures on 4/22 grew Candida non-albicans and one colony of ESBL Klebsiella. PICC line was placed   Tagged RBC scan  was negative . CT abdomen 5/5 showed no fluid collection ,Bowel obstruction which is suspected to be caused by an internal hernia. NG tube was placed under fluoroscopy 5/9. She had a small bowel follow-through 5/9, developed respiratory failure with hypoxia, tachycardia and tachypnea was transferred to ICU placed on BiPAP, blood pressure on the low side required Levophed,WBC up to 28.5    code status was changed to [facility] on 5/13. S/p right colectomy with ileocolic anastomosis,open cholecystectomy and excision of small bowel diverticulum 5/15 . Interval history :  She was extubated 5/20. She is not feeling well today complaining of abdominal pain, no nausea or vomiting. She had low-grade fever of 100.2 yesterday, no fever today. No other complaints. She had 1 bowel movement earlier today.   The patient had a PICC line in the right arm ,receiving TPN  External urinary catheter in place          Past Medical History:   Diagnosis Date    Abnormal computed tomography of cervical spine     sclerotic bone appearance     CVA (cerebral vascular accident) ([address]     left  side weakness    GERD (gastroesophageal reflux disease)     Hypertension     Paraproteinemia     Weight loss       Past Surgical History:   Procedure Laterality Date    ABDOMEN SURGERY N/A 5/25/2019    ABDOMEN DEBRIDEMENT WOUND CLOSURE REOPENING OF RECENT LAPOROATOMY, ABDOMINAL WASHOUT, REPAIR FASCIAL DEHISENCE WITH MESH performed by Maddison Greenberg DO at [facility] 5/15/2019    CHOLECYSTECTOMY performed by Maddison Greenberg DO at [address] 5/25/2019    GASTROSTOMY TUBE PLACEMENT performed by Maddison Greenberg DO at [facility]  4/14/2019         LAPAROSCOPY N/A 5/15/2019 LAPAROSCOPY EXPLORATORY CONVERTED TO EXPLORATORY LAPAROTOMY/ RIGHT COLON RESECTION AND ANASTAMOSIS/ OPEN CHOLECYSTECTOMY/ EXTENSIVE LYSIS OF ADHESIONS performed by Zoraida Nash DO at [facility] 10  07/2011    Pacemaker is Medtronic Revo (compatible). Leads placed in 1995 are NOT MRI compatible. Placed at [facility]. V's per Dr. Kent Earing can not have an MRI.  UPPER GASTROINTESTINAL ENDOSCOPY N/A 4/16/2019    EGD ESOPHAGOGASTRODUODENOSCOPY @ BEDSIDE  ICU 2002 performed by Martín Gonzalez MD at [address]          Admission Meds  No current facility-administered medications on file prior to encounter. Current Outpatient Medications on File Prior to Encounter   Medication Sig Dispense Refill    oxyCODONE-acetaminophen (PERCOCET) 5-325 MG per tablet Take 1 tablet by mouth every 6 hours as needed for Pain.  senna-docusate (PERICOLACE) 8.6-50 MG per tablet Take 1 tablet by mouth 2 times daily      budesonide-formoterol (SYMBICORT) 160-4.5 MCG/ACT AERO Inhale 2 puffs into the lungs 2 times daily      sucralfate (CARAFATE) 1 GM/10ML suspension Take 1 g by mouth 4 times daily       traZODone (DESYREL) 50 MG tablet Take 50 mg by mouth nightly      tiZANidine (ZANAFLEX) 2 MG tablet Take 2 mg by mouth every 8 hours as needed (left knee)       aspirin 81 MG tablet Take 81 mg by mouth daily      clopidogrel (PLAVIX) 75 MG tablet TAKE 1 TABLET DAILY 30 tablet 2    DOCQLACE 100 MG capsule TAKE 1 CAPSULE BY MOUTH IN THE MORNING & IN THE EVENING -DRINK PLENTYOF WATER WHILE TAKING THIS MEDICINE 30 capsule 1    amLODIPine (NORVASC) 10 MG tablet Take 10 mg by mouth daily.  LORazepam (ATIVAN) 0.5 MG tablet Take 0.5 mg by mouth every 6 hours as needed for Anxiety.  therapeutic multivitamin-minerals (THERAGRAN-M) tablet Take 1 tablet by mouth daily.  omeprazole (PRILOSEC) 20 MG capsule Take 20 mg by mouth daily.       venlafaxine (EFFEXOR) 37.5 MG tablet Take 37.5 mg by mouth 3 times daily.      Misc. Devices [facility] [address] wheelchair with left fupper extremity support  Dx: stroke with left hemiparesis. 1 each 0           Allergies  Allergies   Allergen Reactions    Penicillins Shortness Of Breath and Rash    Amoxicillin         Social   Social History     Tobacco Use    Smoking status: Current Some Day Smoker     Packs/day: 1.00     Years: 30.00     Pack years: 30.00    Smokeless tobacco: Never Used   Substance Use Topics    Alcohol use: Not Currently     Alcohol/week: 16.8 oz     Types: 28 Glasses of wine per week                History reviewed. No pertinent family history. Review of Systems  Other than above 10 systems reviewed negative    Tolerating antibiotics. Physical Exam  BP (!) 151/62   Pulse 83   Temp 98 °F (36.7 °C) (Oral)   Resp 20   Ht 5' (1.524 m)   Wt 122 lb 5.7 oz (55.5 kg)   SpO2 98%   BMI 23.90 kg/m²           General Appearance: Awake, oriented ,not under distress  . Skin: warm and dry, no rash or erythema  Head: normocephalic and atraumatic  Eyes: pupils equal, round  Neck: neck supple and non tender   Pulmonary/Chest: Clear to auscultation bilaterally-   Cardiovascular: normal rate, regular rhythm, normal S1 and S2, no murmurs. Abdomen: Patient refused abdominal exam  Extremities: no cyanosis, clubbing   Edema   PICC line in place       Data Review:    Recent Labs     05/27/19  0507  05/28/19  0551 05/28/19  1535 05/29/19  0030 05/29/19  0734   WBC 11.9*  --  11.5*  --   --  12.3*   HGB 6.9*   < > 8.7* 8.5* 9.0* 9.0*   HCT 22.1*   < > 26.5* 25.5* 27.4* 27.3*   MCV 88.8  --  87.2  --   --  86.8     --  318  --   --  329    < > = values in this interval not displayed.      Recent Labs     05/27/19  0507 05/28/19  0551 05/29/19  0734    135 137   K 2.9* 3.7 2.8*    104 102   CO2 21 24 23   PHOS 2.6 2.3* 2.0*   BUN 29* 14 12   CREATININE <0.40* <0.40* <0.40*       Imaging Studies:                           All appropriate imaging studies and reports reviewed: Yes       Ct Abdomen Pelvis Wo Contrast Additional Contrast? Oral    Result Date: 4/14/2019  EXAMINATION: CT OF THE ABDOMEN AND PELVIS WITHOUT CONTRAST 4/14/2019 7:34 pm TECHNIQUE: CT of the abdomen and pelvis was performed without the administration of intravenous contrast. Multiplanar reformatted images are provided for review. Dose modulation, iterative reconstruction, and/or weight based adjustment of the mA/kV was utilized to reduce the radiation dose to as low as reasonably achievable. COMPARISON: 04/11/2019 HISTORY: ORDERING SYSTEM PROVIDED HISTORY: ABDOMINAL PAIN TECHNOLOGIST PROVIDED HISTORY: Water soluble contrast only please Ordering Physician Provided Reason for Exam: Abdominal pain - Vented patient. Contrast given via nurse through NG tube. Acuity: Unknown Type of Exam: Unknown Relevant Medical/Surgical History: Hx - Sepsis due to urinary tract infection. FINDINGS: Lower Chest: New moderate layering bilateral pleural effusions with bilateral lower lobe atelectasis. Organs: Limited evaluation due lack of intravenous contrast.  Cholelithiasis redemonstrated. No gallbladder wall thickening or biliary ductal dilatation. Scattered tiny hypodense lesions in the liver are too small to characterize but statistically represent benign cysts or hemangiomas and appear unchanged. The pancreas, spleen, adrenal glands, and kidneys are unremarkable. There is no hydronephrosis or urinary tract calculus. GI/Bowel: The stomach is distended. Enteric tube is in place. No contrast is seen distal to the pylorus and there is contrast reflux into the distal esophagus. There is no evidence of bowel obstruction. The appendix is not definitely visualized. No focal pericecal inflammatory changes are evident. Pelvis: The urinary bladder is decompressed by Tran catheter. No pelvic mass is seen. Peritoneum/Retroperitoneum: Small amount of free fluid in the pelvic cavity.  No free air or focal fluid collection. No abnormal lymph node. Normal abdominal aortic caliber. Moderate calcific atherosclerosis. Bones/Soft Tissues: No acute osseous abnormality. Diffuse anasarca. Moderate degenerative changes in the lumbar spine. 1. Distended, contrast filled stomach with reflux of contrast into the esophagus. No enteric contrast is seen distal to the pylorus suggesting delayed gastric emptying in the setting of ileus. 2. New moderate layering bilateral pleural effusions with bilateral lower lobe atelectasis. 3. Small amount of nonspecific free fluid in the pelvic cavity. 4. Anasarca. 5. Cholelithiasis. Xr Chest (single View Frontal)    Result Date: 4/13/2019  EXAMINATION: SINGLE XRAY VIEW OF THE CHEST 4/13/2019 7:18 am COMPARISON: April 12, 2019 HISTORY: ORDERING SYSTEM PROVIDED HISTORY: dyspnea TECHNOLOGIST PROVIDED HISTORY: dyspnea Ordering Physician Provided Reason for Exam: dyspnea Acuity: Acute Type of Exam: Subsequent/Follow-up Additional signs and symptoms: dyspnea FINDINGS: A right IJ catheter is seen with its tip terminating at the superior cavoatrial junction. The left chest wall pacemaker and leads are stable. The cardiomediastinal silhouette is stable. There is interval increased opacity at the right lung base, may be related to atelectasis versus pneumonia. There is small atelectasis and mild pleural effusion at the left lung base. There is no pneumothorax. There is no acute osseous abnormality. Interval increased opacity at the right lung base, may be related to atelectasis versus pneumonia. Small atelectasis and mild pleural effusion at the left lung, new since the prior study. Xr Femur Left (min 2 Views)    Result Date: 3/27/2019  EXAMINATION: 4 XRAY VIEWS OF THE LEFT FEMUR; 2 XRAY VIEWS OF THE LEFT KNEE; 3 XRAY VIEWS OF THE LEFT TIBIA AND FIBULA 3/27/2019 7:00 pm COMPARISON: Left hip 11/14/2015.  HISTORY: ORDERING SYSTEM PROVIDED HISTORY: pain TECHNOLOGIST PROVIDED HISTORY: pain Ordering Physician Provided Reason for Exam: pt twisted wrong, lt knee pain, unable to straighten knee. Acuity: Acute Type of Exam: Initial FINDINGS: Left femur, four views: The bones are diffusely osteopenic. No acute fracture deformity. The hip joint is maintained. The femoral head projects within the acetabulum. No focal soft tissue abnormality is seen. Left knee, two views: The knee is flexed. No acute osseous abnormality. No joint effusion. Joint spaces are not optimally profiled but no significant arthritic changes are apparent. Left tib-fib, three views: There is evidence of a remote healed distal tibia fracture. No acute fracture or dislocation is seen. No focal soft tissue abnormality. No acute osseous abnormality of the left femur. No acute osseous abnormality of the left knee. No acute osseous abnormality of the left tib-fib. Healed remote distal tibia fracture. Marked osteopenia, likely due to disuse. Xr Knee Left (1-2 Views)    Result Date: 3/27/2019  EXAMINATION: 4 XRAY VIEWS OF THE LEFT FEMUR; 2 XRAY VIEWS OF THE LEFT KNEE; 3 XRAY VIEWS OF THE LEFT TIBIA AND FIBULA 3/27/2019 7:00 pm COMPARISON: Left hip 11/14/2015. HISTORY: ORDERING SYSTEM PROVIDED HISTORY: pain TECHNOLOGIST PROVIDED HISTORY: pain Ordering Physician Provided Reason for Exam: pt twisted wrong, lt knee pain, unable to straighten knee. Acuity: Acute Type of Exam: Initial FINDINGS: Left femur, four views: The bones are diffusely osteopenic. No acute fracture deformity. The hip joint is maintained. The femoral head projects within the acetabulum. No focal soft tissue abnormality is seen. Left knee, two views: The knee is flexed. No acute osseous abnormality. No joint effusion. Joint spaces are not optimally profiled but no significant arthritic changes are apparent. Left tib-fib, three views: There is evidence of a remote healed distal tibia fracture. No acute fracture or dislocation is seen.   No focal soft tissue abnormality. No acute osseous abnormality of the left femur. No acute osseous abnormality of the left knee. No acute osseous abnormality of the left tib-fib. Healed remote distal tibia fracture. Marked osteopenia, likely due to disuse. Xr Knee Left (3 Views)    Result Date: 3/30/2019  EXAMINATION: 3 XRAY VIEWS OF THE LEFT KNEE 3/30/2019 10:58 am COMPARISON: March 27, 2018 HISTORY: ORDERING SYSTEM PROVIDED HISTORY: F/u L knee pain after cast TECHNOLOGIST PROVIDED HISTORY: AP, oblique, lateral F/u L knee pain after cast FINDINGS: Marked osteopenia. Interval casting, which degrades fine osseous detail question cortical offset in the lateral femoral metaphysis. No malalignment identified. No significant joint effusion. Interval casting. Question nondisplaced fracture in the lateral femoral metaphysis. Osteopenia. Xr Tibia Fibula Left (2 Views)    Result Date: 3/27/2019  EXAMINATION: 4 XRAY VIEWS OF THE LEFT FEMUR; 2 XRAY VIEWS OF THE LEFT KNEE; 3 XRAY VIEWS OF THE LEFT TIBIA AND FIBULA 3/27/2019 7:00 pm COMPARISON: Left hip 11/14/2015. HISTORY: ORDERING SYSTEM PROVIDED HISTORY: pain TECHNOLOGIST PROVIDED HISTORY: pain Ordering Physician Provided Reason for Exam: pt twisted wrong, lt knee pain, unable to straighten knee. Acuity: Acute Type of Exam: Initial FINDINGS: Left femur, four views: The bones are diffusely osteopenic. No acute fracture deformity. The hip joint is maintained. The femoral head projects within the acetabulum. No focal soft tissue abnormality is seen. Left knee, two views: The knee is flexed. No acute osseous abnormality. No joint effusion. Joint spaces are not optimally profiled but no significant arthritic changes are apparent. Left tib-fib, three views: There is evidence of a remote healed distal tibia fracture. No acute fracture or dislocation is seen. No focal soft tissue abnormality. No acute osseous abnormality of the left femur.  No acute osseous abnormality of the left knee. No acute osseous abnormality of the left tib-fib. Healed remote distal tibia fracture. Marked osteopenia, likely due to disuse. Xr Abdomen (kub) (single Ap View)    Result Date: 4/14/2019  EXAMINATION: SINGLE SUPINE XRAY VIEW(S) OF THE ABDOMEN 4/14/2019 7:39 am COMPARISON: CT abdomen and pelvis  film from 11 April 2019 HISTORY: [address] [facility] [address]: Abd Distention TECHNOLOGIST PROVIDED HISTORY: Abd Distention Ordering Physician Provided Reason for Exam: Abdominal distention. Pt was moving around in the bed. Best films at present time. Acuity: Acute Type of Exam: Initial Additional signs and symptoms: Abdominal distention. Pt was moving around in the bed. Best films at present time. FINDINGS: Portable view time stamped at 748 hours demonstrates an intestinal tube terminating in the midportion of a gaseous Spike dilated stomach. Densities are present over the stomach likely medication. Bipolar pacemaker is in situ with intact leads. Heart size is top-normal, stable. Gaseous distension of the stomach and loop of bowel in the upper mid abdomen is noted but there is gas and fecal material in the rectum. Gastric outlet obstruction or proximal small bowel partial obstruction is suspected. No free air is noted. Midline city is present over the pelvis likely a monitor or CT small bore catheter. Vascular calcification is present in the pelvis. Persistent preferential gaseous distension of the stomach although there is some gas in the upper abdomen and gas and fecal material present in the rectosigmoid. Findings suggest gastric outlet obstruction. Ct Abdomen Pelvis W Iv Contrast    Result Date: 4/11/2019  EXAMINATION: CT OF THE ABDOMEN AND PELVIS WITH CONTRAST 4/11/2019 5:14 pm TECHNIQUE: CT of the abdomen and pelvis was performed with the administration of intravenous contrast. Multiplanar reformatted images are provided for review.  Dose modulation, iterative reconstruction, and/or weight based adjustment of the mA/kV was utilized to reduce the radiation dose to as low as reasonably achievable. COMPARISON: None. HISTORY: ORDERING SYSTEM PROVIDED HISTORY: Abdominal pain TECHNOLOGIST PROVIDED HISTORY: IV Only Contrast Ordering Physician Provided Reason for Exam: patient c/o abd pain for an hour FINDINGS: Lower Chest: Trace pleural fluid bilaterally is noted. Indwelling cardiac pacemaker is present. Heart size is normal.  Coronary artery calcifications are evident. The esophagus is significantly dilated and fluid-filled. No paraesophageal adenopathy is evident. Organs: The spleen, pancreas, and adrenals are unremarkable. The liver contains hypodensities, likely cysts. The gallbladder is distended and contains a solitary gallstone. The kidneys excrete contrast bilaterally. Extrarenal collecting systems are noted. The ureters are mildly dilated down to the urinary bladder without evidence of intraluminal or ureteral obstructing calculi. GI/Bowel: Marked distention of the stomach is noted; this continues to the pylorus with appearance suggesting partial gastric outlet obstruction. Fluid is present in some small bowel loops and colon distal to the stomach. No small bowel obstruction. Pelvis: Marked distention of the urinary bladder is noted. 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 Distended ureters may be related to reflux or infection. No free pelvic fluid, pelvic or inguinal adenopathy is noted. Peritoneum/Retroperitoneum: No aortic aneurysm. Mild to moderate plaque is noted in the infrarenal abdominal aorta and both proximal iliac arteries. Shotty lymph nodes are present around the aorta in the upper abdomen. No mesenteric adenopathy is noted. Bones/Soft Tissues: Degenerative changes are present in the hips and lower lumbar facets. Estimated biologic radiation dose for this procedure:258.77 mGy/cm2.      1. Dilatation of the thoracic esophagus filled with fluid. No obstructive process is noted. No adjacent enlarged lymph nodes. 2. Trace pleural fluid. 3. Marked distention of the stomach. This appears to extend to the pylorus. Partial gastric outlet obstruction is not excluded. 4. Bilateral dilated ureters without obstructing calculi. Urinary bladder is markedly distended with bladder wall air suggesting emphysematous cystitis. Dilatation of the ureters may be related to reflux or infection. 5. Atherosclerotic disease. 6. Other findings as above. Critical results were called by Dr. Girish Sánchez MD to [address] on 4/11/2019 at 17:37. Xr Chest Portable    Result Date: 4/16/2019  EXAMINATION: SINGLE XRAY VIEW OF THE CHEST 4/16/2019 6:54 am COMPARISON: 04/15/2019, 610 hours HISTORY: ORDERING SYSTEM PROVIDED HISTORY: ETT placement TECHNOLOGIST PROVIDED HISTORY: ETT placement Ordering Physician Provided Reason for Exam: on vent Acuity: Acute Type of Exam: Initial 42-year-old female on ventilator; check endotracheal tube placement FINDINGS: Portable AP upright view of the chest. Endotracheal tube distal tip overlying the mid trachea approximately 4.1 cm above the level of the ron. Enteric tube traverses the GE junction with distal tip excluded from the field of view. Left subclavian approach cardiac pacemaker device distal lead tips relatively stable in position. Right internal jugular approach central venous catheter distal tip overlying the high right atrium, stable. Cardiac monitor leads overlie the chest. Atherosclerotic calcification of the thoracic aorta. Slight stable volume loss of the left hemithorax. No pneumothorax. No free air. Dense retrocardiac/left basilar airspace consolidation and small left-sided pleural effusion. Stable mild focal opacity at the right mid lung zone. Underlying COPD. Stable mild pulmonary vascular congestion and left-sided predominant parahilar opacity.   Visualized osseous structures remain unchanged. 1. Stable multifocal airspace disease as detailed above with dense retrocardiac/left basilar airspace consolidation and small left-sided pleural effusion. Mild pulmonary vascular congestion. Findings may represent edema or multifocal pneumonia. 2. Underlying COPD. 3. Tubes and line as detailed above. Xr Chest Portable    Result Date: 4/15/2019  EXAMINATION: SINGLE XRAY VIEW OF THE CHEST 4/15/2019 6:47 am COMPARISON: 14 April 2019 HISTORY: ORDERING SYSTEM PROVIDED HISTORY: ETT placement TECHNOLOGIST PROVIDED HISTORY: ETT placement Ordering Physician Provided Reason for Exam: on vent Acuity: Acute Type of Exam: Initial FINDINGS: AP portable view of the chest time stamped at 612 hours demonstrates overlying cardiac monitoring electrodes. Endotracheal tube terminates 4 cm above the ron. Bipolar pacemaker enters from the left with intact leads in appropriate positions. Intestinal tube extends beyond the fundus of the stomach, tip not included. Right internal jugular catheter terminates at the cavoatrial junction. Heart size is normal.  Aortic arch is calcified. There is interval improvement in vascular congestion with resolution of perihilar opacities. Some bibasilar opacities remain. No extrapleural air is noted. Osseous structures are stable. Interval improvement in vascular congestion and bilateral opacities consistent with resolving pulmonary edema. Tubes and lines as above. Xr Chest Portable    Result Date: 4/14/2019  EXAMINATION: SINGLE XRAY VIEW OF THE CHEST 4/14/2019 7:57 am COMPARISON: Portable chest 04/13/2019. HISTORY: ORDERING SYSTEM PROVIDED HISTORY: Intubation TECHNOLOGIST PROVIDED HISTORY: Intubation Ordering Physician Provided Reason for Exam: intubation Acuity: Acute Type of Exam: Initial Additional signs and symptoms: intubation FINDINGS: Endotracheal tube terminates over the midthoracic trachea.   Dual-chamber pacemaker leads appear unchanged in position. Right IJ approach central venous catheter unchanged in position. Heart size not substantially changed. Perihilar and basilar opacities further increased. Left pleural effusion increased in size. Findings may reflect pulmonary edema, progressed from yesterday's exam.  Left pleural effusion increased in size. Xr Chest Portable    Result Date: 4/12/2019  EXAMINATION: SINGLE XRAY VIEW OF THE CHEST 4/12/2019 5:26 am COMPARISON: November 14, 2015. HISTORY: ORDERING SYSTEM PROVIDED HISTORY: line placement TECHNOLOGIST PROVIDED HISTORY: line placement Ordering Physician Provided Reason for Exam: New right side line placement. Acuity: Acute Type of Exam: Initial Additional signs and symptoms: New right side line placement. FINDINGS: Stable left pectoral trans venous cardiac pacer device. New right IJ central venous catheter with tip near the superior atrial caval junction. Normal lung volume. No new consolidation. Curvilinear radiopacity projecting over the right upper lobe likely represents artifact from a skin fold. No pleural effusion or pneumothorax. Stable cardiomediastinal silhouette and great vessels with redemonstration of atherosclerotic thoracic aorta. New right IJ central venous catheter with tip near the superior atrial caval junction. No pneumothorax. No new consolidation. Thank you for allowing me to participate in the care of your patient. Please feel free to contact me with any questions or concerns.      Mariana Rao MD

## 2019-05-30 LAB
ABSOLUTE EOS #: 0.15 K/UL (ref 0–0.4)
ABSOLUTE IMMATURE GRANULOCYTE: ABNORMAL K/UL (ref 0–0.3)
ABSOLUTE LYMPH #: 1.35 K/UL (ref 1–4.8)
ABSOLUTE MONO #: 1.05 K/UL (ref 0.1–1.3)
ANION GAP SERPL CALCULATED.3IONS-SCNC: 9 MMOL/L (ref 9–17)
BASOPHILS # BLD: 0 % (ref 0–2)
BASOPHILS ABSOLUTE: 0 K/UL (ref 0–0.2)
BNP INTERPRETATION: ABNORMAL
BUN BLDV-MCNC: 12 MG/DL (ref 8–23)
BUN/CREAT BLD: ABNORMAL (ref 9–20)
CALCIUM SERPL-MCNC: 7.4 MG/DL (ref 8.6–10.4)
CHLORIDE BLD-SCNC: 102 MMOL/L (ref 98–107)
CO2: 22 MMOL/L (ref 20–31)
CREAT SERPL-MCNC: <0.4 MG/DL (ref 0.5–0.9)
DIFFERENTIAL TYPE: ABNORMAL
EKG ATRIAL RATE: 105 BPM
EKG ATRIAL RATE: 288 BPM
EKG ATRIAL RATE: 97 BPM
EKG P AXIS: 73 DEGREES
EKG P AXIS: 80 DEGREES
EKG P-R INTERVAL: 126 MS
EKG P-R INTERVAL: 136 MS
EKG Q-T INTERVAL: 326 MS
EKG Q-T INTERVAL: 326 MS
EKG Q-T INTERVAL: 338 MS
EKG QRS DURATION: 68 MS
EKG QRS DURATION: 68 MS
EKG QRS DURATION: 72 MS
EKG QTC CALCULATION (BAZETT): 414 MS
EKG QTC CALCULATION (BAZETT): 446 MS
EKG QTC CALCULATION (BAZETT): 456 MS
EKG R AXIS: -3 DEGREES
EKG R AXIS: 26 DEGREES
EKG R AXIS: 5 DEGREES
EKG T AXIS: 73 DEGREES
EKG T AXIS: 76 DEGREES
EKG T AXIS: 84 DEGREES
EKG VENTRICULAR RATE: 105 BPM
EKG VENTRICULAR RATE: 118 BPM
EKG VENTRICULAR RATE: 97 BPM
EOSINOPHILS RELATIVE PERCENT: 1 % (ref 0–4)
GFR AFRICAN AMERICAN: ABNORMAL ML/MIN
GFR NON-AFRICAN AMERICAN: ABNORMAL ML/MIN
GFR SERPL CREATININE-BSD FRML MDRD: ABNORMAL ML/MIN/{1.73_M2}
GFR SERPL CREATININE-BSD FRML MDRD: ABNORMAL ML/MIN/{1.73_M2}
GLUCOSE BLD-MCNC: 108 MG/DL (ref 65–105)
GLUCOSE BLD-MCNC: 116 MG/DL (ref 65–105)
GLUCOSE BLD-MCNC: 120 MG/DL (ref 65–105)
GLUCOSE BLD-MCNC: 122 MG/DL (ref 65–105)
GLUCOSE BLD-MCNC: 124 MG/DL (ref 65–105)
GLUCOSE BLD-MCNC: 128 MG/DL (ref 70–99)
HCT VFR BLD CALC: 29.5 % (ref 36–46)
HEMOGLOBIN: 9.6 G/DL (ref 12–16)
IMMATURE GRANULOCYTES: ABNORMAL %
LYMPHOCYTES # BLD: 9 % (ref 24–44)
MCH RBC QN AUTO: 28.9 PG (ref 26–34)
MCHC RBC AUTO-ENTMCNC: 32.4 G/DL (ref 31–37)
MCV RBC AUTO: 89.2 FL (ref 80–100)
MONOCYTES # BLD: 7 % (ref 1–7)
MORPHOLOGY: ABNORMAL
NRBC AUTOMATED: ABNORMAL PER 100 WBC
PDW BLD-RTO: 16.7 % (ref 11.5–14.9)
PHOSPHORUS: 2.6 MG/DL (ref 2.6–4.5)
PLATELET # BLD: 378 K/UL (ref 150–450)
PLATELET ESTIMATE: ABNORMAL
PMV BLD AUTO: 8.1 FL (ref 6–12)
POTASSIUM SERPL-SCNC: 4.5 MMOL/L (ref 3.7–5.3)
PRO-BNP: 1695 PG/ML
RBC # BLD: 3.31 M/UL (ref 4–5.2)
RBC # BLD: ABNORMAL 10*6/UL
SEG NEUTROPHILS: 83 % (ref 36–66)
SEGMENTED NEUTROPHILS ABSOLUTE COUNT: 12.45 K/UL (ref 1.3–9.1)
SODIUM BLD-SCNC: 133 MMOL/L (ref 135–144)
WBC # BLD: 15 K/UL (ref 3.5–11)
WBC # BLD: ABNORMAL 10*3/UL

## 2019-05-30 PROCEDURE — 99233 SBSQ HOSP IP/OBS HIGH 50: CPT | Performed by: INTERNAL MEDICINE

## 2019-05-30 PROCEDURE — 97530 THERAPEUTIC ACTIVITIES: CPT

## 2019-05-30 PROCEDURE — 2580000003 HC RX 258: Performed by: STUDENT IN AN ORGANIZED HEALTH CARE EDUCATION/TRAINING PROGRAM

## 2019-05-30 PROCEDURE — 6360000002 HC RX W HCPCS: Performed by: NURSE PRACTITIONER

## 2019-05-30 PROCEDURE — 6370000000 HC RX 637 (ALT 250 FOR IP): Performed by: SURGERY

## 2019-05-30 PROCEDURE — 97110 THERAPEUTIC EXERCISES: CPT

## 2019-05-30 PROCEDURE — 6360000002 HC RX W HCPCS: Performed by: INTERNAL MEDICINE

## 2019-05-30 PROCEDURE — 2500000003 HC RX 250 WO HCPCS: Performed by: SURGERY

## 2019-05-30 PROCEDURE — 99231 SBSQ HOSP IP/OBS SF/LOW 25: CPT | Performed by: INTERNAL MEDICINE

## 2019-05-30 PROCEDURE — 83880 ASSAY OF NATRIURETIC PEPTIDE: CPT

## 2019-05-30 PROCEDURE — 6360000002 HC RX W HCPCS: Performed by: STUDENT IN AN ORGANIZED HEALTH CARE EDUCATION/TRAINING PROGRAM

## 2019-05-30 PROCEDURE — 94640 AIRWAY INHALATION TREATMENT: CPT

## 2019-05-30 PROCEDURE — 1200000000 HC SEMI PRIVATE

## 2019-05-30 PROCEDURE — 80048 BASIC METABOLIC PNL TOTAL CA: CPT

## 2019-05-30 PROCEDURE — C9113 INJ PANTOPRAZOLE SODIUM, VIA: HCPCS | Performed by: SURGERY

## 2019-05-30 PROCEDURE — 82947 ASSAY GLUCOSE BLOOD QUANT: CPT

## 2019-05-30 PROCEDURE — 6360000002 HC RX W HCPCS: Performed by: SURGERY

## 2019-05-30 PROCEDURE — 36415 COLL VENOUS BLD VENIPUNCTURE: CPT

## 2019-05-30 PROCEDURE — 6370000000 HC RX 637 (ALT 250 FOR IP): Performed by: INTERNAL MEDICINE

## 2019-05-30 PROCEDURE — 85025 COMPLETE CBC W/AUTO DIFF WBC: CPT

## 2019-05-30 PROCEDURE — 6370000000 HC RX 637 (ALT 250 FOR IP): Performed by: STUDENT IN AN ORGANIZED HEALTH CARE EDUCATION/TRAINING PROGRAM

## 2019-05-30 PROCEDURE — 2580000003 HC RX 258: Performed by: SURGERY

## 2019-05-30 RX ORDER — PANTOPRAZOLE SODIUM 40 MG/1
40 TABLET, DELAYED RELEASE ORAL
Status: DISCONTINUED | OUTPATIENT
Start: 2019-05-31 | End: 2019-05-31 | Stop reason: HOSPADM

## 2019-05-30 RX ORDER — FUROSEMIDE 10 MG/ML
20 INJECTION INTRAMUSCULAR; INTRAVENOUS ONCE
Status: COMPLETED | OUTPATIENT
Start: 2019-05-30 | End: 2019-05-30

## 2019-05-30 RX ORDER — POTASSIUM CHLORIDE 7.45 MG/ML
10 INJECTION INTRAVENOUS PRN
Status: DISCONTINUED | OUTPATIENT
Start: 2019-05-30 | End: 2019-05-31 | Stop reason: HOSPADM

## 2019-05-30 RX ORDER — POTASSIUM CHLORIDE 20 MEQ/1
40 TABLET, EXTENDED RELEASE ORAL PRN
Status: DISCONTINUED | OUTPATIENT
Start: 2019-05-30 | End: 2019-05-31 | Stop reason: HOSPADM

## 2019-05-30 RX ADMIN — METOCLOPRAMIDE 10 MG: 5 INJECTION, SOLUTION INTRAMUSCULAR; INTRAVENOUS at 05:30

## 2019-05-30 RX ADMIN — CALCIUM GLUCONATE: 94 INJECTION, SOLUTION INTRAVENOUS at 18:52

## 2019-05-30 RX ADMIN — POTASSIUM CHLORIDE 10 MEQ: 7.46 INJECTION, SOLUTION INTRAVENOUS at 03:45

## 2019-05-30 RX ADMIN — POTASSIUM CHLORIDE 10 MEQ: 7.46 INJECTION, SOLUTION INTRAVENOUS at 02:29

## 2019-05-30 RX ADMIN — IPRATROPIUM BROMIDE AND ALBUTEROL SULFATE 1 AMPULE: .5; 3 SOLUTION RESPIRATORY (INHALATION) at 19:29

## 2019-05-30 RX ADMIN — METOPROLOL TARTRATE 50 MG: 50 TABLET ORAL at 09:09

## 2019-05-30 RX ADMIN — AMIODARONE HYDROCHLORIDE 100 MG: 100 TABLET ORAL at 09:09

## 2019-05-30 RX ADMIN — IPRATROPIUM BROMIDE AND ALBUTEROL SULFATE 1 AMPULE: .5; 3 SOLUTION RESPIRATORY (INHALATION) at 11:05

## 2019-05-30 RX ADMIN — FUROSEMIDE 20 MG: 10 INJECTION, SOLUTION INTRAMUSCULAR; INTRAVENOUS at 13:10

## 2019-05-30 RX ADMIN — LEVOTHYROXINE SODIUM 75 MCG: 75 TABLET ORAL at 05:30

## 2019-05-30 RX ADMIN — Medication 10 ML: at 09:10

## 2019-05-30 RX ADMIN — POTASSIUM CHLORIDE 10 MEQ: 7.46 INJECTION, SOLUTION INTRAVENOUS at 01:24

## 2019-05-30 RX ADMIN — MIRTAZAPINE 7.5 MG: 15 TABLET, ORALLY DISINTEGRATING ORAL at 22:03

## 2019-05-30 RX ADMIN — METOPROLOL TARTRATE 50 MG: 50 TABLET ORAL at 22:03

## 2019-05-30 RX ADMIN — Medication 2 PUFF: at 09:08

## 2019-05-30 RX ADMIN — PANTOPRAZOLE SODIUM 40 MG: 40 INJECTION, POWDER, FOR SOLUTION INTRAVENOUS at 09:10

## 2019-05-30 RX ADMIN — I.V. FAT EMULSION 100 ML: 20 EMULSION INTRAVENOUS at 19:09

## 2019-05-30 RX ADMIN — AMIODARONE HYDROCHLORIDE 100 MG: 100 TABLET ORAL at 22:02

## 2019-05-30 RX ADMIN — Medication 2 PUFF: at 22:02

## 2019-05-30 RX ADMIN — POTASSIUM CHLORIDE 10 MEQ: 7.46 INJECTION, SOLUTION INTRAVENOUS at 05:05

## 2019-05-30 RX ADMIN — IRON SUCROSE 200 MG: 20 INJECTION, SOLUTION INTRAVENOUS at 11:25

## 2019-05-30 ASSESSMENT — ENCOUNTER SYMPTOMS
COUGH: 0
VOMITING: 0
SHORTNESS OF BREATH: 0
ABDOMINAL DISTENTION: 0
ABDOMINAL PAIN: 1
STRIDOR: 0
DIARRHEA: 0
EYE DISCHARGE: 0
CHEST TIGHTNESS: 0
WHEEZING: 0
SORE THROAT: 0
NAUSEA: 0
EYE REDNESS: 0
CONSTIPATION: 0
APNEA: 0

## 2019-05-30 ASSESSMENT — PAIN DESCRIPTION - LOCATION: LOCATION: ABDOMEN

## 2019-05-30 ASSESSMENT — PAIN SCALES - WONG BAKER: WONGBAKER_NUMERICALRESPONSE: 8

## 2019-05-30 ASSESSMENT — PAIN SCALES - GENERAL: PAINLEVEL_OUTOF10: 8

## 2019-05-30 ASSESSMENT — PAIN DESCRIPTION - PAIN TYPE: TYPE: SURGICAL PAIN

## 2019-05-30 ASSESSMENT — PAIN DESCRIPTION - ORIENTATION: ORIENTATION: LEFT

## 2019-05-30 NOTE — PLAN OF CARE
Problem: Risk for Impaired Skin Integrity  Goal: Tissue integrity - skin and mucous membranes  Description  Structural intactness and normal physiological function of skin and  mucous membranes. 5/30/2019 0445 by Jake Alves RN  Outcome: Ongoing  Note:   Skin assessment as charted. No new areas of breakdown. Problem: Falls - Risk of:  Goal: Will remain free from falls  Description  Will remain free from falls  5/30/2019 0445 by Jake Alves RN  Outcome: Ongoing  Note:   Skin assessment as charted. No new areas of breakdown. Problem: Falls - Risk of:  Goal: Absence of physical injury  Description  Absence of physical injury  Outcome: Ongoing  Note:   Skin assessment as charted. No new areas of breakdown. Problem: Infection, Septic Shock:  Goal: Will show no infection signs and symptoms  Description  Will show no infection signs and symptoms  5/30/2019 0445 by Jake Alves RN  Outcome: Ongoing  Note:   Patient remains free from infection and no signs and symptoms of infection.       Problem: Tissue Perfusion, Altered:  Goal: Circulatory function within specified parameters  Description  Circulatory function within specified parameters  Outcome: Ongoing     Problem: Pain:  Goal: Pain level will decrease  Description  Pain level will decrease  5/30/2019 0445 by Jake Alves RN  Outcome: Ongoing     Problem: Nutrition  Goal: Optimal nutrition therapy  Description       Outcome: Ongoing

## 2019-05-30 NOTE — PROGRESS NOTES
Physical Therapy  Facility/Department: [facility] MED SURG  Daily Treatment Note  NAME: Joss Wild  : 1950  MRN: [id number]    Date of Service: 2019    Discharge Recommendations:  (P) ECF with PT        Patient Diagnosis(es): The primary encounter diagnosis was Urinary retention. Diagnoses of Emphysematous cystitis, Septicemia ([address] and Small bowel obstruction ([address] were also pertinent to this visit. has a past medical history of Abnormal computed tomography of cervical spine, CVA (cerebral vascular accident) ([address] GERD (gastroesophageal reflux disease), Hypertension, Paraproteinemia, and Weight loss. has a past surgical history that includes pacemaker placement (2011); intubation (2019); Upper gastrointestinal endoscopy (N/A, 2019); laparoscopy (N/A, 5/15/2019); Cholecystectomy (N/A, 5/15/2019); Abdomen surgery (N/A, 2019); and Gastrostomy tube placement (N/A, 2019). Restrictions  Restrictions/Precautions  Restrictions/Precautions: (P) Fall Risk, Contact Precautions  Implants present? : (P) Pacemaker  Position Activity Restriction  Other position/activity restrictions:  NG tube,Indwelling catheter, MORGAN drain,  R foot IV, RUE IV, L sided weakness from previous CVA  Subjective   Subjective  Subjective: (P) Pt  sleeply, agreeable to therapy. Continued to c/o pain. General Comment  Comments: (P) Pt very cooperative, worked well w/ therapist.  Pain Assessment  Pain Assessment: (P) 0-10  Pain Level: (P) 8  Hammer-Baker Pain Rating: (P) Hurts whole lot  Pain Type: (P) Surgical pain  Pain Location: (P) Abdomen  Pain Orientation: (P) Left  Patient Observation  Observations: (P) Left U/E very swollen. (Reposition w/ pillows for elevation.)       Orientation  Orientation  Overall Orientation Status: (P) Within Functional Limits  Cognition      Objective   Bed mobility  Scooting: (P) Dependent/Total  Transfers  Sit to Stand: (P) Unable to assess  Stand to sit: (P) Unable to assess  Comment: (P) Pt unable to perform any transfers at this time due to pain/weakness. Ambulation  Ambulation?: (P) No  Stairs/Curb  Stairs?: (P) No     Balance  Posture: (P) Fair  Sitting - Static: (P) Poor(Pt lean heavily to the Left)  Comments: (P) Pt required MAX A x 1 to maintain upright position while sitting @ the EOB. Other exercises  Other exercises?: (P) Yes  Other exercises 1: (P) PROM all exts supine, pt c/o pain in Left U/E, increased swelling. Other exercises 2: (P) Bed mobility  Other exercises 3: (P) Pt reposition for comfort         Other Activities: (P) Dangling protocol(MAX support x 7 mins)  Comment: (P) Pt in a mess, assist PCA with rolling to clean pt up and change depend. Assessment   Body structures, Functions, Activity limitations: (P) Decreased endurance;Decreased functional mobility ; Decreased strength;Decreased balance;Decreased ROM; Decreased safe awareness  Assessment: (P) Treatment limited due to patients high anxiety due to abdominal pain. Treatment Diagnosis: (P) weakness all 4's difficulty walking  Specific instructions for Next Treatment: (P) progress to mobility as able  Prognosis: (P) Fair  Patient Education: (P) Circulation/LE ROM exercises, pursed lip breathing technique for relaxation/pain control, supine<>sit transfer, and importance of OOB activity to prevent further pneumonia and effects of bed rest.  REQUIRES PT FOLLOW UP: (P) Yes  Activity Tolerance  Activity Tolerance: (P) Patient limited by pain; Patient limited by fatigue;Patient limited by endurance  Activity Tolerance: (P) Pt in high pain with movement of left knee, pt resistance to ROM.      G-Code     OutComes Score                                                  AM-PAC Score             Goals  Short term goals  Time Frame for Short term goals: (P) 10 visits  Short term goal 1: (P) improve strength RUE/RLE to 4/5 to assist in bed mobility and transfers  Short term goal 2: (P) improve bed mobility to minx1  Short term goal 3: (P) improve transfers to minx1  Short term goal 4: (P) progress to Gait and/or use of wheelchair for mobility  Patient Goals   Patient goals : (P) return home    Plan    Plan  Times per week: (P) 5-7x/wk  Times per day: (P) Daily  Specific instructions for Next Treatment: (P) progress to mobility as able  Current Treatment Recommendations: (P) ROM, Strengthening, Functional Mobility Training, Transfer Training, Balance Training, Endurance Training, Wheelchair Mobility Training  Plan Comment: (P) to continue PT per POC  Safety Devices  Type of devices: (P) Left in bed, Bed alarm in place, All fall risk precautions in place  Restraints  Initially in place: No  Restraints: B wrist restraint     Therapy Time   Individual Concurrent Group Co-treatment   Time In 1327         Time Out 1354         Minutes [address] [place] [address] \[facility] [place] [facility]\""   Electronically signed by Houston Siemens, PTA on 5/30/2019 at 4:07 PM

## 2019-05-30 NOTE — PROGRESS NOTES
Pulmonary Progress Note  [facility]      Patient - Ashvin Ferreira,  Age - 77 y.o.    - 1950      Room Number - 9862/0648-41   N -  [id number]   [facility] # - [de-identified]  Date of Admission -  2019  2:48 PM    Follow-up: Pleural effusion    Consulting Service/Physician   Consulting - Viry Zacarias MD  Primary Care Physician - Yonathan Fernandes, DO     SUBJECTIVE   on, room air now  Denies any fever or chills  No chest pain, short of breath or much cough  No abdominal pain, nausea, loose stools, negative C. diff, no blood  On TPN, still not tolerating tube feed at 10 mL per hour    OBJECTIVE   VITALS    height is 5' (1.524 m) and weight is 122 lb 5.7 oz (55.5 kg). Her axillary temperature is 97.5 °F (36.4 °C). Her blood pressure is 143/70 (abnormal) and her pulse is 77. Her respiration is 16 and oxygen saturation is 96%. Body mass index is 23.9 kg/m². Temperature Range: Temp: 97.5 °F (36.4 °C) Temp  Av.1 °F (36.7 °C)  Min: 97.5 °F (36.4 °C)  Max: 98.6 °F (37 °C)  BP Range:  Systolic (32IIU), CLJ:720 , Min:143 , EIT:160     Diastolic (15GJH), YXT:02, Min:64, Max:70    Pulse Range: Pulse  Av  Min: 77  Max: 89  Respiration Range: Resp  Av  Min: 16  Max: 20  Current Pulse Ox[de-identified]  SpO2: 96 %  24HR Pulse Ox Range:  SpO2  Av %  Min: 95 %  Max: 97 %  Oxygen Amount and Delivery: O2 Flow Rate (L/min): 2 L/min    Wt Readings from Last 3 Encounters:   19 122 lb 5.7 oz (55.5 kg)   19 89 lb (40.4 kg)   19 94 lb 1.6 oz (42.7 kg)       I/O (24 Hours)    Intake/Output Summary (Last 24 hours) at 2019 1132  Last data filed at 2019 0539  Gross per 24 hour   Intake 4033.41 ml   Output 1310 ml   Net 2723.41 ml       EXAM     General Appearance  Awake, alert,  in no acute distress  HEENT - normocephalic, atraumatic.    Neck - no JVD,  trachea midline   Lungs - decreased sounds, coarse, no wheezing or distress  Cardiovascular - Heart sounds are normal.  regular rate and rhythm   Abdomen - Soft, non tender, nondistended,   Neurologic - looks comfortable, following commands  Skin - No bruising or bleeding  Extremities - No clubbing, cyanosis, edema    MEDS      metoprolol tartrate  50 mg Oral BID    metoclopramide  10 mg Intravenous Q6H    levothyroxine  75 mcg Oral Daily    iron sucrose  200 mg Intravenous Q24H    amiodarone  100 mg Oral BID    mirtazapine  7.5 mg Oral Nightly    ipratropium-albuterol  1 ampule Inhalation 4x daily    fat emulsion  100 mL Intravenous Daily    insulin lispro  0-6 Units Subcutaneous Q6H    sodium chloride flush  10 mL Intravenous 2 times per day    pantoprazole  40 mg Intravenous Daily    And    sodium chloride (PF)  10 mL Intravenous Daily    alteplase  1 mg Intracatheter Once    mometasone-formoterol  2 puff Inhalation BID      PN-Adult 2-in-1 Central Line (Standard) 65 mL/hr at 05/29/19 1819    dextrose 5 % and 0.45 % NaCl 25 mL/hr at 05/29/19 2312    dextrose       potassium chloride **OR** potassium alternative oral replacement **OR** potassium chloride, acetaminophen, fentanNYL **OR** fentanNYL, LORazepam, glucose, dextrose, glucagon (rDNA), dextrose, sodium phosphate IVPB **OR** sodium phosphate IVPB, sodium chloride flush, ondansetron, potassium chloride, magnesium sulfate    LABS   CBC   Recent Labs     05/30/19  0715   WBC 15.0*   HGB 9.6*   HCT 29.5*   MCV 89.2        BMP:   Lab Results   Component Value Date     05/30/2019    K 4.5 05/30/2019     05/30/2019    CO2 22 05/30/2019    BUN 12 05/30/2019    LABALBU 2.4 05/25/2019    LABALBU 4.6 05/17/2012    CREATININE <0.40 05/30/2019    CALCIUM 7.4 05/30/2019    GFRAA CANNOT BE CALCULATED 05/30/2019    LABGLOM CANNOT BE CALCULATED 05/30/2019     ABGs:  Lab Results   Component Value Date    PHART 7.487 05/20/2019    PO2ART 62.2 05/20/2019    PZG7TYG 48.9 05/20/2019      Lab Results Component Value Date    MODE PRVC 05/20/2019     Ionized Calcium:  No results found for: IONCA  Magnesium:    Lab Results   Component Value Date    MG 1.9 05/29/2019      Phosphorus:    Lab Results   Component Value Date    PHOS 2.6 05/29/2019        LIVER PROFILE No results for input(s): AST, ALT, LIPASE, BILIDIR, BILITOT, ALKPHOS in the last 72 hours. Invalid input(s): AMYLASE,  ALB  INR No results for input(s): INR in the last 72 hours. PTT No results for input(s): APTT in the last 72 hours. BNP No results for input(s): BNP in the last 72 hours.     RADIOLOGY   Chest x-ray with mild congestion and moderate size left pleural effusion  (See actual reports for details)    ASSESSMENT/PLAN   Principal Problem:    Acute respiratory failure (HCC)  Active Problems:    Emphysematous cystitis    Hemorrhage of rectum and anus    Small bowel obstruction (HCC)    Severe COPD  Acute cholecystitis/cholecystostomy tube 4/22 then had ex lap 5/16 with  extensive lysis of adhesions, right colectomy with anastomosis and open cholecystectomy, found to have cholecystostomy tube traversing through the transverse colon  E. coli UTI/aspiration pneumonia /   History of CVA with left hemiparesis  Anxiety/depression  Recent GI bleed  Paroxysmal A. Fib/hypertension  Acute anemia  Hypervolemia, fluid balance +2703 ML, proBNP 1695, echocardiogram April 2019 with ejection fraction 63% and diastolic dysfunction  Left pleural effusion     Plan:     O2 as needed  Bronchodilators  Protonix    Lovenox held because of anemia  Off Antibiotic as per ID  Patient denies any short of breath, we'll give a dose of Lasix and follow-up chest x-ray in a.m. and see if we need to do thoracentesis,   Discussed with patient and staff      Electronically signed by Barbera Brunner, MD on 5/30/2019 at 11:32 AM

## 2019-05-30 NOTE — PROGRESS NOTES
Nutrition Assessment (Parenteral Nutrition)    Type and Reason for Visit: Reassess    Nutrition Recommendations: Continue Dental Soft diet, 100 mL of lipids and TPN at 65 mL/hr. Following changes made to TPN additives: remove MVI and trace elements. Continue Vital AF.1.2 at 10 ml/hr (goal rate 55 mL/hr). Nutrition Assessment: Patient continues to receive adequate nutrition from TPN and lipids. PO intakes are slightly improved but continues to be poor. Patient consumed about 250 kcals on 5/29 with mostly bites of food taken at meals. Patient remains at risk for compromise due to inadquate oral intake. Tube feeding is at 10 mL/hr. Patient now has diarrhea, Reglan is being held. Will monitor nutrition progression. When appropriate advance tube feeding rate to goal rate 55 mL/hr. Malnutrition Assessment:  · Malnutrition Status: At risk for malnutrition  · Context: Acute illness or injury  · Findings of the 6 clinical characteristics of malnutrition (Minimum of 2 out of 6 clinical characteristics is required to make the diagnosis of moderate or severe Protein Calorie Malnutrition based on AND/ASPEN Guidelines):  1. Energy Intake-Greater than 75% of estimated energy requirement, (Variable at times; over 75% of needs met for majority of admission)    2. Weight Loss-Unable to assess(edema present),    3. Fat Loss-Unable to assess(Pt is thin; edema present; no known recent loss of muscle or fat),    4. Muscle Loss-Unable to assess,    5. Fluid Accumulation-Moderate to severe fluid accumulation, Extremities, Generalized  6.   Strength-Not measured    Nutrition Risk Level: High    Nutrient Needs:  · Estimated Daily Total Kcal: 8022-8238 based on 35-39 kcal per kg of usual body wt  · Estimated Daily Protein (g): 63-68 based on 1.4-1.5 gm/kg IBW(revised)    Nutrition Diagnosis:   · Problem: Inadequate oral intake  · Etiology: related to Insufficient energy/nutrient consumption, Alteration in GI function

## 2019-05-30 NOTE — FLOWSHEET NOTE
05/30/19 1515   Encounter Summary   Services provided to: Patient   Referral/Consult From: Palliative Care;Rounding   Continue Visiting   (5/30/19)   Complexity of Encounter Low   Length of Encounter 15 minutes   Routine   Type Follow up   Assessment Approachable  (\"tired\" today; kept her eyes closed)   Intervention Sustaining presence/ Ministry of presence   Outcome Expressed gratitude;Expressed feelings/needs/concerns; Acceptance

## 2019-05-30 NOTE — PLAN OF CARE
Nutrition Problem: Inadequate oral intake  Intervention: Food and/or Nutrient Delivery: Continue current diet, Continue current Tube Feeding, Modify current Parenteral Nutrition  Nutritional Goals: Adequate nutritional intake or provision

## 2019-05-30 NOTE — PROGRESS NOTES
Infectious disease Consult Note      Patient: Lanie Paget  : 1950  Acct#:  [id number]     Date:  2019    Assessment:   Leukocytosis and low-grade fever  . Aspiration pneumonia treated  . Shock resolved . Cholecystitis status post cholecystectomy tube  grew Candida non-albicans and one colony of ESBL Klebsiella on culture . SBO S/p right colectomy with ileocolic anastomosis,open cholecystectomy and excision of small bowel diverticulum 5/15 . Single positive blood culture on 5/10 STAPHYLOCOCCUS SPECIES, COAGULASE NEGATIVE likely contamination     Ecoli Emphysematous cystitis initially treatedtreated     Aspiration pneumonia of right lower lobe initially     Yeast growth on sputum culture suspect contamination     MRSA carrier    Urinary retention     Anemia GI bleed followed by GI     PCN allergy      History of CVA with left hemiparesis              Recommendations:   Continue to monitor off ABXS   Follow CBC and renal function  . Continue supportive care . Subjective:       History of Present Illness  Patient is a 77 y.o.  female admitted with Sepsis due to urinary tract infection   who is seen in consult for the same . She presented w dysuria and lower abd pain for around a week PRIOR TO ADMISSION associated with vomiting ,was found hypotensive with leukocytosis . CT suggested emphysematous cystitis,possible gastric outlet obstruction. CXR showed a right lower infiltrate. High CA-19-9,CEA  Allergy to [facility] w SOB   Urine culture  grew ESCHERICHIA COLI resistant to Ampicillin ,sensitive to all other tested ABXS . Blood cultures negative . Mycoplasma IGM 0.97   YEAST MODERATE GROWTH on sputum culture   S/P EGD   with reported esophagitis. GB US Findings suggesting acute cholecystitis. HIIDA showed absent gallbladder filling.    She was extubated on   Status post cholecystectomy tube  by interventional radiology,  cultures on  grew Candida non-albicans and one colony of ESBL Klebsiella. PICC line was placed   Tagged RBC scan  was negative . CT abdomen 5/5 showed no fluid collection ,Bowel obstruction which is suspected to be caused by an internal hernia. NG tube was placed under fluoroscopy 5/9. She had a small bowel follow-through 5/9, developed respiratory failure with hypoxia, tachycardia and tachypnea was transferred to ICU placed on BiPAP, blood pressure on the low side required Levophed,WBC up to 28.5    code status was changed to [facility] on 5/13. S/p right colectomy with ileocolic anastomosis,open cholecystectomy and excision of small bowel diverticulum 5/15 . Interval history :  She was extubated 5/20. She is not tolerating tube feed , no nausea or vomiting. She had low-grade fever of 100.2 on 5/28, no fever today. The patient had a PICC line in the right arm ,receiving TPN  External urinary catheter in place  5/29 Chest x-ray showed Enlarging left pleural effusion. and urinalysis unremarkable. Pro calcitonin 0.12 level and lactic acid nl.           Past Medical History:   Diagnosis Date    Abnormal computed tomography of cervical spine     sclerotic bone appearance     CVA (cerebral vascular accident) ([address]     left  side weakness    GERD (gastroesophageal reflux disease)     Hypertension     Paraproteinemia     Weight loss       Past Surgical History:   Procedure Laterality Date    ABDOMEN SURGERY N/A 5/25/2019    ABDOMEN DEBRIDEMENT WOUND CLOSURE REOPENING OF RECENT LAPOROATOMY, ABDOMINAL WASHOUT, REPAIR FASCIAL DEHISENCE WITH MESH performed by Karo Beckham DO at [facility] 5/15/2019    CHOLECYSTECTOMY performed by Karo Beckham DO at [facility] 5/25/2019    GASTROSTOMY TUBE PLACEMENT performed by Karo Beckham DO at [place] [id number]  4/14/2019         LAPAROSCOPY N/A 5/15/2019    LAPAROSCOPY EXPLORATORY CONVERTED TO EXPLORATORY LAPAROTOMY/ RIGHT COLON fupper extremity support  Dx: stroke with left hemiparesis. 1 each 0           Allergies  Allergies   Allergen Reactions    Penicillins Shortness Of Breath and Rash    Amoxicillin         Social   Social History     Tobacco Use    Smoking status: Current Some Day Smoker     Packs/day: 1.00     Years: 30.00     Pack years: 30.00    Smokeless tobacco: Never Used   Substance Use Topics    Alcohol use: Not Currently     Alcohol/week: 16.8 oz     Types: 28 Glasses of wine per week                History reviewed. No pertinent family history. Review of Systems  Other than above 10 systems reviewed negative    Tolerating antibiotics. Physical Exam  /66   Pulse 76   Temp 97.7 °F (36.5 °C) (Axillary)   Resp 16   Ht 5' (1.524 m)   Wt 122 lb 5.7 oz (55.5 kg)   SpO2 94%   BMI 23.90 kg/m²           General Appearance: not under distress  . Skin: warm and dry, no rash or erythema  Head: normocephalic and atraumatic    Pulmonary/Chest: Clear to auscultation bilaterally-   Cardiovascular: normal rate, regular rhythm, normal S1 and S2, no murmurs. Abdomen: soft  Extremities: no cyanosis, clubbing   Edema   PICC line in place       Data Review:    Recent Labs     05/28/19  0551  05/29/19  0734 05/29/19  1549 05/29/19  2333 05/30/19  0715   WBC 11.5*  --  12.3*  --   --  15.0*   HGB 8.7*   < > 9.0* 9.0* 8.8* 9.6*   HCT 26.5*   < > 27.3* 27.4* 27.1* 29.5*   MCV 87.2  --  86.8  --   --  89.2     --  329  --   --  378    < > = values in this interval not displayed.      Recent Labs     05/28/19  0551 05/29/19  0734 05/29/19  2333 05/30/19  0715    137  --  133*   K 3.7 2.8*  --  4.5    102  --  102   CO2 24 23  --  22   PHOS 2.3* 2.0* 2.6  --    BUN 14 12  --  12   CREATININE <0.40* <0.40*  --  <0.40*       Imaging Studies:                           All appropriate imaging studies and reports reviewed: Yes       Ct Abdomen Pelvis Wo Contrast Additional Contrast? Oral    Result Date: Bones/Soft Tissues: No acute osseous abnormality. Diffuse anasarca. Moderate degenerative changes in the lumbar spine. 1. Distended, contrast filled stomach with reflux of contrast into the esophagus. No enteric contrast is seen distal to the pylorus suggesting delayed gastric emptying in the setting of ileus. 2. New moderate layering bilateral pleural effusions with bilateral lower lobe atelectasis. 3. Small amount of nonspecific free fluid in the pelvic cavity. 4. Anasarca. 5. Cholelithiasis. Xr Chest (single View Frontal)    Result Date: 4/13/2019  EXAMINATION: SINGLE XRAY VIEW OF THE CHEST 4/13/2019 7:18 am COMPARISON: April 12, 2019 HISTORY: ORDERING SYSTEM PROVIDED HISTORY: dyspnea TECHNOLOGIST PROVIDED HISTORY: dyspnea Ordering Physician Provided Reason for Exam: dyspnea Acuity: Acute Type of Exam: Subsequent/Follow-up Additional signs and symptoms: dyspnea FINDINGS: A right IJ catheter is seen with its tip terminating at the superior cavoatrial junction. The left chest wall pacemaker and leads are stable. The cardiomediastinal silhouette is stable. There is interval increased opacity at the right lung base, may be related to atelectasis versus pneumonia. There is small atelectasis and mild pleural effusion at the left lung base. There is no pneumothorax. There is no acute osseous abnormality. Interval increased opacity at the right lung base, may be related to atelectasis versus pneumonia. Small atelectasis and mild pleural effusion at the left lung, new since the prior study. Xr Femur Left (min 2 Views)    Result Date: 3/27/2019  EXAMINATION: 4 XRAY VIEWS OF THE LEFT FEMUR; 2 XRAY VIEWS OF THE LEFT KNEE; 3 XRAY VIEWS OF THE LEFT TIBIA AND FIBULA 3/27/2019 7:00 pm COMPARISON: Left hip 11/14/2015. HISTORY: ORDERING SYSTEM PROVIDED HISTORY: pain TECHNOLOGIST PROVIDED HISTORY: pain Ordering Physician Provided Reason for Exam: pt twisted wrong, lt knee pain, unable to straighten knee. left knee. No acute osseous abnormality of the left tib-fib. Healed remote distal tibia fracture. Marked osteopenia, likely due to disuse. Xr Knee Left (3 Views)    Result Date: 3/30/2019  EXAMINATION: 3 XRAY VIEWS OF THE LEFT KNEE 3/30/2019 10:58 am COMPARISON: March 27, 2018 HISTORY: ORDERING SYSTEM PROVIDED HISTORY: F/u L knee pain after cast TECHNOLOGIST PROVIDED HISTORY: AP, oblique, lateral F/u L knee pain after cast FINDINGS: Marked osteopenia. Interval casting, which degrades fine osseous detail question cortical offset in the lateral femoral metaphysis. No malalignment identified. No significant joint effusion. Interval casting. Question nondisplaced fracture in the lateral femoral metaphysis. Osteopenia. Xr Tibia Fibula Left (2 Views)    Result Date: 3/27/2019  EXAMINATION: 4 XRAY VIEWS OF THE LEFT FEMUR; 2 XRAY VIEWS OF THE LEFT KNEE; 3 XRAY VIEWS OF THE LEFT TIBIA AND FIBULA 3/27/2019 7:00 pm COMPARISON: Left hip 11/14/2015. HISTORY: ORDERING SYSTEM PROVIDED HISTORY: pain TECHNOLOGIST PROVIDED HISTORY: pain Ordering Physician Provided Reason for Exam: pt twisted wrong, lt knee pain, unable to straighten knee. Acuity: Acute Type of Exam: Initial FINDINGS: Left femur, four views: The bones are diffusely osteopenic. No acute fracture deformity. The hip joint is maintained. The femoral head projects within the acetabulum. No focal soft tissue abnormality is seen. Left knee, two views: The knee is flexed. No acute osseous abnormality. No joint effusion. Joint spaces are not optimally profiled but no significant arthritic changes are apparent. Left tib-fib, three views: There is evidence of a remote healed distal tibia fracture. No acute fracture or dislocation is seen. No focal soft tissue abnormality. No acute osseous abnormality of the left femur. No acute osseous abnormality of the left knee. No acute osseous abnormality of the left tib-fib.   Healed remote distal tibia fracture. Marked osteopenia, likely due to disuse. Xr Abdomen (kub) (single Ap View)    Result Date: 4/14/2019  EXAMINATION: SINGLE SUPINE XRAY VIEW(S) OF THE ABDOMEN 4/14/2019 7:39 am COMPARISON: CT abdomen and pelvis  film from 11 April 2019 HISTORY: [address]: Abd Distention TECHNOLOGIST PROVIDED HISTORY: Abd Distention Ordering Physician Provided Reason for Exam: Abdominal distention. Pt was moving around in the bed. Best films at present time. Acuity: Acute Type of Exam: Initial Additional signs and symptoms: Abdominal distention. Pt was moving around in the bed. Best films at present time. FINDINGS: Portable view time stamped at 748 hours demonstrates an intestinal tube terminating in the midportion of a gaseous Spike dilated stomach. Densities are present over the stomach likely medication. Bipolar pacemaker is in situ with intact leads. Heart size is top-normal, stable. Gaseous distension of the stomach and loop of bowel in the upper mid abdomen is noted but there is gas and fecal material in the rectum. Gastric outlet obstruction or proximal small bowel partial obstruction is suspected. No free air is noted. Midline city is present over the pelvis likely a monitor or CT small bore catheter. Vascular calcification is present in the pelvis. Persistent preferential gaseous distension of the stomach although there is some gas in the upper abdomen and gas and fecal material present in the rectosigmoid. Findings suggest gastric outlet obstruction. Ct Abdomen Pelvis W Iv Contrast    Result Date: 4/11/2019  EXAMINATION: CT OF THE ABDOMEN AND PELVIS WITH CONTRAST 4/11/2019 5:14 pm TECHNIQUE: CT of the abdomen and pelvis was performed with the administration of intravenous contrast. Multiplanar reformatted images are provided for review.  Dose modulation, iterative reconstruction, and/or weight based adjustment of the mA/kV was utilized to reduce the radiation dose to as low as reasonably achievable. COMPARISON: None. HISTORY: ORDERING SYSTEM PROVIDED HISTORY: Abdominal pain TECHNOLOGIST PROVIDED HISTORY: IV Only Contrast Ordering Physician Provided Reason for Exam: patient c/o abd pain for an hour FINDINGS: Lower Chest: Trace pleural fluid bilaterally is noted. Indwelling cardiac pacemaker is present. Heart size is normal.  Coronary artery calcifications are evident. The esophagus is significantly dilated and fluid-filled. No paraesophageal adenopathy is evident. Organs: The spleen, pancreas, and adrenals are unremarkable. The liver contains hypodensities, likely cysts. The gallbladder is distended and contains a solitary gallstone. The kidneys excrete contrast bilaterally. Extrarenal collecting systems are noted. The ureters are mildly dilated down to the urinary bladder without evidence of intraluminal or ureteral obstructing calculi. GI/Bowel: Marked distention of the stomach is noted; this continues to the pylorus with appearance suggesting partial gastric outlet obstruction. Fluid is present in some small bowel loops and colon distal to the stomach. No small bowel obstruction. Pelvis: Marked distention of the urinary bladder is noted. 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 Distended ureters may be related to reflux or infection. No free pelvic fluid, pelvic or inguinal adenopathy is noted. Peritoneum/Retroperitoneum: No aortic aneurysm. Mild to moderate plaque is noted in the infrarenal abdominal aorta and both proximal iliac arteries. Shotty lymph nodes are present around the aorta in the upper abdomen. No mesenteric adenopathy is noted. Bones/Soft Tissues: Degenerative changes are present in the hips and lower lumbar facets. Estimated biologic radiation dose for this procedure:258.77 mGy/cm2.     1. Dilatation of the thoracic esophagus filled with fluid. No obstructive process is noted.   No adjacent enlarged lymph nodes. 2. Trace pleural fluid. 3. Marked distention of the stomach. This appears to extend to the pylorus. Partial gastric outlet obstruction is not excluded. 4. Bilateral dilated ureters without obstructing calculi. Urinary bladder is markedly distended with bladder wall air suggesting emphysematous cystitis. Dilatation of the ureters may be related to reflux or infection. 5. Atherosclerotic disease. 6. Other findings as above. Critical results were called by Dr. Tracie Hwang MD to [address] on 4/11/2019 at 17:37. Xr Chest Portable    Result Date: 4/16/2019  EXAMINATION: SINGLE XRAY VIEW OF THE CHEST 4/16/2019 6:54 am COMPARISON: 04/15/2019, 610 hours HISTORY: ORDERING SYSTEM PROVIDED HISTORY: ETT placement TECHNOLOGIST PROVIDED HISTORY: ETT placement Ordering Physician Provided Reason for Exam: on vent Acuity: Acute Type of Exam: Initial 42-year-old female on ventilator; check endotracheal tube placement FINDINGS: Portable AP upright view of the chest. Endotracheal tube distal tip overlying the mid trachea approximately 4.1 cm above the level of the ron. Enteric tube traverses the GE junction with distal tip excluded from the field of view. Left subclavian approach cardiac pacemaker device distal lead tips relatively stable in position. Right internal jugular approach central venous catheter distal tip overlying the high right atrium, stable. Cardiac monitor leads overlie the chest. Atherosclerotic calcification of the thoracic aorta. Slight stable volume loss of the left hemithorax. No pneumothorax. No free air. Dense retrocardiac/left basilar airspace consolidation and small left-sided pleural effusion. Stable mild focal opacity at the right mid lung zone. Underlying COPD. Stable mild pulmonary vascular congestion and left-sided predominant parahilar opacity. Visualized osseous structures remain unchanged.      1. Stable multifocal airspace disease as detailed above with dense retrocardiac/left basilar airspace consolidation and small left-sided pleural effusion. Mild pulmonary vascular congestion. Findings may represent edema or multifocal pneumonia. 2. Underlying COPD. 3. Tubes and line as detailed above. Xr Chest Portable    Result Date: 4/15/2019  EXAMINATION: SINGLE XRAY VIEW OF THE CHEST 4/15/2019 6:47 am COMPARISON: 14 April 2019 HISTORY: ORDERING SYSTEM PROVIDED HISTORY: ETT placement TECHNOLOGIST PROVIDED HISTORY: ETT placement Ordering Physician Provided Reason for Exam: on vent Acuity: Acute Type of Exam: Initial FINDINGS: AP portable view of the chest time stamped at 612 hours demonstrates overlying cardiac monitoring electrodes. Endotracheal tube terminates 4 cm above the ron. Bipolar pacemaker enters from the left with intact leads in appropriate positions. Intestinal tube extends beyond the fundus of the stomach, tip not included. Right internal jugular catheter terminates at the cavoatrial junction. Heart size is normal.  Aortic arch is calcified. There is interval improvement in vascular congestion with resolution of perihilar opacities. Some bibasilar opacities remain. No extrapleural air is noted. Osseous structures are stable. Interval improvement in vascular congestion and bilateral opacities consistent with resolving pulmonary edema. Tubes and lines as above. Xr Chest Portable    Result Date: 4/14/2019  EXAMINATION: SINGLE XRAY VIEW OF THE CHEST 4/14/2019 7:57 am COMPARISON: Portable chest 04/13/2019. HISTORY: ORDERING SYSTEM PROVIDED HISTORY: Intubation TECHNOLOGIST PROVIDED HISTORY: Intubation Ordering Physician Provided Reason for Exam: intubation Acuity: Acute Type of Exam: Initial Additional signs and symptoms: intubation FINDINGS: Endotracheal tube terminates over the midthoracic trachea. Dual-chamber pacemaker leads appear unchanged in position. Right IJ approach central venous catheter unchanged in position. Heart size not substantially changed. Perihilar and basilar opacities further increased. Left pleural effusion increased in size. Findings may reflect pulmonary edema, progressed from yesterday's exam.  Left pleural effusion increased in size. Xr Chest Portable    Result Date: 4/12/2019  EXAMINATION: SINGLE XRAY VIEW OF THE CHEST 4/12/2019 5:26 am COMPARISON: November 14, 2015. HISTORY: ORDERING SYSTEM PROVIDED HISTORY: line placement TECHNOLOGIST PROVIDED HISTORY: line placement Ordering Physician Provided Reason for Exam: New right side line placement. Acuity: Acute Type of Exam: Initial Additional signs and symptoms: New right side line placement. FINDINGS: Stable left pectoral trans venous cardiac pacer device. New right IJ central venous catheter with tip near the superior atrial caval junction. Normal lung volume. No new consolidation. Curvilinear radiopacity projecting over the right upper lobe likely represents artifact from a skin fold. No pleural effusion or pneumothorax. Stable cardiomediastinal silhouette and great vessels with redemonstration of atherosclerotic thoracic aorta. New right IJ central venous catheter with tip near the superior atrial caval junction. No pneumothorax. No new consolidation. Thank you for allowing me to participate in the care of your patient. Please feel free to contact me with any questions or concerns.      Robert Mario MD

## 2019-05-30 NOTE — PROGRESS NOTES
[address] [facility].    PROGRESS NOTE             5/30/2019    11:42 AM    Name:   Erin Damon  MRN:     [id number]     Acct:      [de-identified]   Room:   2048/2048-01  IP Day:  52  Admit Date:  4/11/2019  2:48 PM    PCP:  Lucrecia Gomez DO  Code Status:  Full Code    Subjective: Interval History Status: improved. Patient seen and examined at bedside in the ICU. Afebrile, VSS. Patient appears better today visually. Alert and awake. Overnight apparently the patient was found pulling at lines. Mitts were placed accordingly and was redirected. RUQ pain has significantly decreased - HIDA scan yesterday was not able to visualize the GB. WBC trending down. Afebrile. Tachycardic into the 150's overnight, required Amiodarone drip and an increase in PRN lopressor. This AM HR <102, SBP controlled. I do not believe she is medically stable enough still at this time for the OR. Repeat Blood cx -ve @ 3 days    CXR stable in interval.     Prealbumin 11.8       5/28    pst op long icu course     S/p open ashley, ex lap, R colectomy w/ ileocolic anastomosis, excision small bowel diverticulum, extensive enterolysis. Intubated on vent, low pressor requirement, worsening edema, on solumedrol and albumin support. Extubated on 5/20. Edema improving, feeding transitioning to NGT.          5/29 still has abd pain      Low grade fever    u/a neg    no nuasea/ vomiting    eating a lttle   Brief History:     Per EMR:     The patient is a 77 y.o.  Female who presents withAbdominal Pain   and she is admitted to the hospital for the management of abdominal distension, nausea, vomiting, and acute cystitis.      Patient presents with chills, nausea, vomiting, abdominal pain (diffuse, but worse in the suprapubic region), abdominal distension, and dysuria of 2-3 days duration. Denies hematemesis.  Acknowledges difficulty keeping food down but tolerating mometasone-formoterol  2 puff Inhalation BID     Continuous Infusions:    PN-Adult 2-in-1 Central Line (Standard) 65 mL/hr at 19 1819    dextrose 5 % and 0.45 % NaCl 25 mL/hr at 19 2312    dextrose       PRN Meds: potassium chloride **OR** potassium alternative oral replacement **OR** potassium chloride, acetaminophen, fentanNYL **OR** fentanNYL, LORazepam, glucose, dextrose, glucagon (rDNA), dextrose, sodium phosphate IVPB **OR** sodium phosphate IVPB, sodium chloride flush, ondansetron, potassium chloride, magnesium sulfate    Data:     Past Medical History:   has a past medical history of Abnormal computed tomography of cervical spine, CVA (cerebral vascular accident) ([address] GERD (gastroesophageal reflux disease), Hypertension, Paraproteinemia, and Weight loss. Social History:   reports that she has been smoking. She has a 30.00 pack-year smoking history. She has never used smokeless tobacco. She reports that she drank about 16.8 oz of alcohol per week. She reports that she does not use drugs. Family History: History reviewed. No pertinent family history. Vitals:  /66   Pulse 76   Temp 97.7 °F (36.5 °C) (Axillary)   Resp 16   Ht 5' (1.524 m)   Wt 122 lb 5.7 oz (55.5 kg)   SpO2 94%   BMI 23.90 kg/m²   Temp (24hrs), Av.9 °F (36.6 °C), Min:97.5 °F (36.4 °C), Max:98.6 °F (37 °C)    Recent Labs     19  1755 19  0011 19  0537 19  1120   POCGLU 85 116* 120* 124*       I/O(24Hr):     Intake/Output Summary (Last 24 hours) at 2019 1142  Last data filed at 2019 0539  Gross per 24 hour   Intake 4033.41 ml   Output 1310 ml   Net 2723.41 ml       Labs:    [unfilled]    Lab Results   Component Value Date/Time    SPECIAL R AC 5CC PURPLE 3CC RED 2019 12:20 PM     Lab Results   Component Value Date/Time    CULTURE NO GROWTH 6 DAYS 2019 12:20 PM       [unfilled]    Radiology:    Neumann Stage Femur Left (min 2 Views)    Result Date: 3/27/2019  EXAMINATION: 4 XRAY VIEWS OF THE LEFT FEMUR; 2 XRAY VIEWS OF THE LEFT KNEE; 3 XRAY VIEWS OF THE LEFT TIBIA AND FIBULA 3/27/2019 7:00 pm COMPARISON: Left hip 11/14/2015. HISTORY: ORDERING SYSTEM PROVIDED HISTORY: pain TECHNOLOGIST PROVIDED HISTORY: pain Ordering Physician Provided Reason for Exam: pt twisted wrong, lt knee pain, unable to straighten knee. Acuity: Acute Type of Exam: Initial FINDINGS: Left femur, four views: The bones are diffusely osteopenic. No acute fracture deformity. The hip joint is maintained. The femoral head projects within the acetabulum. No focal soft tissue abnormality is seen. Left knee, two views: The knee is flexed. No acute osseous abnormality. No joint effusion. Joint spaces are not optimally profiled but no significant arthritic changes are apparent. Left tib-fib, three views: There is evidence of a remote healed distal tibia fracture. No acute fracture or dislocation is seen. No focal soft tissue abnormality. No acute osseous abnormality of the left femur. No acute osseous abnormality of the left knee. No acute osseous abnormality of the left tib-fib. Healed remote distal tibia fracture. Marked osteopenia, likely due to disuse. Xr Knee Left (1-2 Views)    Result Date: 3/27/2019  EXAMINATION: 4 XRAY VIEWS OF THE LEFT FEMUR; 2 XRAY VIEWS OF THE LEFT KNEE; 3 XRAY VIEWS OF THE LEFT TIBIA AND FIBULA 3/27/2019 7:00 pm COMPARISON: Left hip 11/14/2015. HISTORY: ORDERING SYSTEM PROVIDED HISTORY: pain TECHNOLOGIST PROVIDED HISTORY: pain Ordering Physician Provided Reason for Exam: pt twisted wrong, lt knee pain, unable to straighten knee. Acuity: Acute Type of Exam: Initial FINDINGS: Left femur, four views: The bones are diffusely osteopenic. No acute fracture deformity. The hip joint is maintained. The femoral head projects within the acetabulum. No focal soft tissue abnormality is seen. Left knee, two views: The knee is flexed. No acute osseous abnormality. changes are apparent. Left tib-fib, three views: There is evidence of a remote healed distal tibia fracture. No acute fracture or dislocation is seen. No focal soft tissue abnormality. No acute osseous abnormality of the left femur. No acute osseous abnormality of the left knee. No acute osseous abnormality of the left tib-fib. Healed remote distal tibia fracture. Marked osteopenia, likely due to disuse. Ct Abdomen Pelvis W Iv Contrast    Result Date: 4/11/2019  EXAMINATION: CT OF THE ABDOMEN AND PELVIS WITH CONTRAST 4/11/2019 5:14 pm TECHNIQUE: CT of the abdomen and pelvis was performed with the administration of intravenous contrast. Multiplanar reformatted images are provided for review. Dose modulation, iterative reconstruction, and/or weight based adjustment of the mA/kV was utilized to reduce the radiation dose to as low as reasonably achievable. COMPARISON: None. HISTORY: ORDERING SYSTEM PROVIDED HISTORY: Abdominal pain TECHNOLOGIST PROVIDED HISTORY: IV Only Contrast Ordering Physician Provided Reason for Exam: patient c/o abd pain for an hour FINDINGS: Lower Chest: Trace pleural fluid bilaterally is noted. Indwelling cardiac pacemaker is present. Heart size is normal.  Coronary artery calcifications are evident. The esophagus is significantly dilated and fluid-filled. No paraesophageal adenopathy is evident. Organs: The spleen, pancreas, and adrenals are unremarkable. The liver contains hypodensities, likely cysts. The gallbladder is distended and contains a solitary gallstone. The kidneys excrete contrast bilaterally. Extrarenal collecting systems are noted. The ureters are mildly dilated down to the urinary bladder without evidence of intraluminal or ureteral obstructing calculi. GI/Bowel: Marked distention of the stomach is noted; this continues to the pylorus with appearance suggesting partial gastric outlet obstruction.   Fluid is present in some small bowel loops and colon distal to the stomach. No small bowel obstruction. Pelvis: Marked distention of the urinary bladder is noted. 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 Distended ureters may be related to reflux or infection. No free pelvic fluid, pelvic or inguinal adenopathy is noted. Peritoneum/Retroperitoneum: No aortic aneurysm. Mild to moderate plaque is noted in the infrarenal abdominal aorta and both proximal iliac arteries. Shotty lymph nodes are present around the aorta in the upper abdomen. No mesenteric adenopathy is noted. Bones/Soft Tissues: Degenerative changes are present in the hips and lower lumbar facets. Estimated biologic radiation dose for this procedure:258.77 mGy/cm2.     1. Dilatation of the thoracic esophagus filled with fluid. No obstructive process is noted. No adjacent enlarged lymph nodes. 2. Trace pleural fluid. 3. Marked distention of the stomach. This appears to extend to the pylorus. Partial gastric outlet obstruction is not excluded. 4. Bilateral dilated ureters without obstructing calculi. Urinary bladder is markedly distended with bladder wall air suggesting emphysematous cystitis. Dilatation of the ureters may be related to reflux or infection. 5. Atherosclerotic disease. 6. Other findings as above. Critical results were called by Dr. Helen Bingham MD to [address] on 4/11/2019 at 17:37. Xr Chest Portable    Result Date: 4/12/2019  EXAMINATION: SINGLE XRAY VIEW OF THE CHEST 4/12/2019 5:26 am COMPARISON: November 14, 2015. HISTORY: ORDERING SYSTEM PROVIDED HISTORY: line placement TECHNOLOGIST PROVIDED HISTORY: line placement Ordering Physician Provided Reason for Exam: New right side line placement. Acuity: Acute Type of Exam: Initial Additional signs and symptoms: New right side line placement. FINDINGS: Stable left pectoral trans venous cardiac pacer device. New right IJ central venous catheter with tip near the superior atrial caval junction. Normal lung volume. No new consolidation. Curvilinear radiopacity projecting over the right upper lobe likely represents artifact from a skin fold. No pleural effusion or pneumothorax. Stable cardiomediastinal silhouette and great vessels with redemonstration of atherosclerotic thoracic aorta. New right IJ central venous catheter with tip near the superior atrial caval junction. No pneumothorax. No new consolidation. Physical Examination:        Physical Exam   Constitutional: She appears well-developed. She appears cachectic. She appears ill. No distress. Intubated   HENT:   Head: Normocephalic and atraumatic. Eyes: Pupils are equal, round, and reactive to light. Conjunctivae and EOM are normal.   Neck: Normal range of motion. Right IJ central line in place. Site clean and dry. Cardiovascular: Normal rate, regular rhythm, normal heart sounds and intact distal pulses. Exam reveals no gallop and no friction rub. No murmur heard. Pulmonary/Chest: Effort normal. No stridor. No respiratory distress. She has no wheezes. She has no rales. Intubated and mechanically ventilated. Abdominal: Soft. Bowel sounds are normal. She exhibits no mass. There is tenderness (Decreased tenderness to light palpation. Improvement from prior. ). There is guarding. There is no rebound. RUQ tenderness    Musculoskeletal: Normal range of motion. She exhibits no edema (Anasarca; 3+ b/l pitting edema in upper and lower ext. ). Left knee wrapped in dressing. Posterior bruising noted. Neurological:   Intubated, sedated   Skin: Skin is warm and dry. Capillary refill takes less than 2 seconds. She is not diaphoretic. No pallor. Nursing note and vitals reviewed.     Assessment:        Primary Problem  Acute respiratory failure [facility])    Active Hospital Problems    Diagnosis Date Noted    Fever [R50.9]     Bradycardia [R00.1]     Bacteremia due to coagulase-negative Staphylococcus [R78.81]     Chronic obstructive pulmonary disease, unspecified ([address] [facility] [J44.9] 05/24/2019    Acute respiratory failure ([address] [J96.00] 05/18/2019    Small bowel obstruction ([address] [K56.609]     Anxiety disorder [F41.9]     Noncompliance [Z91.19]     Anemia [D64.9]     GI bleed [K92.2] 05/03/2019    Hemorrhage of rectum and anus [K62.5]     [facility] [J43.2] 04/30/2019    CRP elevated [R79.82]     Elevated procalcitonin [R79.89]     Bandemia [D72.825]     Cholecystitis [K81.9]     Abdominal distention [R14.0]     Elevated CEA [R97.0]     Elevated CA 19-9 level [R97.8]     Urinary retention [R33.9]     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]     Septic shock ([address] [A41.9, R65.21]     Leukemoid reaction [D72.823]     Aspiration pneumonia of right lower lobe due to vomit ([facility] [J69.0]     MRSA carrier [Z22.322]     Sepsis due to urinary tract infection ([address] [A41.9, N39.0] 04/11/2019    Cystitis [N30.90] 04/11/2019       Plan:             cholecystitis s/pex-lap, open cholecystectomy, right colectomy w/ ileocolic anastomosis, extensive enterolysis, POD#11  - ID following - Flagyl, Cipro   - Surgery following - soft diet, s/p G-tube placement on 5/25 w/ dehiscence repair, tube feed 10ml (do not advance), Reglan 10mg q6hrs  - Pulm following - Duoneb, Dulera, O2 PRN         Anemia 2/2 ABLA vs. AOCD, multifactorial  - Hgb 6.9 (from 7.3), transfuse 1U pRBC  - H+H q8hrs, transfuse < 7  - Venofer 200mg qd x 5 doses      Paroxysmal A.fib - resolved   - Amio 100mg BID     Hypothyroid  - TSH - 79  - Levothyroxine increased to 75, will continue to titrate up     Severe malnutrition  - Encourage PO intake  - C/w tube feeds per G tube  - Mirtazapine qhs      Hypokalemia  - K 2.9  - replace, 60meq total  - f/u repeat BMP     DVT PPx  - Lovenox     Dispo  - PT/OT  - SW following - pending [facility] ([id number], Dr. Chu Salinas) vs. Yoselin Steiner       5/28   transfused 5/27   hb 6.9   today 8.7      Not tolerating tf clamped    On tpn   tolerating po            Clamp  g tube   decrease tpn    Increase po nutritional support   follow hb      afib    vr      100    bp 146    Add lopressor   on amioderone po      k 3.7   cr nl      5/29      Post op ashley   low grade fever   still has a lot aof abd pain     [poor oral intake   on tpn   g tube clamped    Tolerating some po intake      kub   neg   ua neg       check cxr      afib vr 89     140/80     increase lopressor 50 mg bid   on amioderone                5/30       Mild overload     pleural effusion       lasix 20 mg    off ivf   on tpn   poor oral intake   tf at 10 ml    ADVANCE    AFIV VR CONTROLLED   BP CONTROLLED   CONT LOPRESSOR/ David Silva MD  5/30/2019  11:42 AM   Jolene Kapoor  Electronically signed by Leanna Cancino MD on 5/30/2019 at 11:42 AM

## 2019-05-30 NOTE — PROGRESS NOTES
RODGER spoke to Jaz Russell at [place]. She is still working on [employer]. 7000 is started in [employer].

## 2019-05-30 NOTE — CARE COORDINATION
DISCHARGE PLANNING NOTE:    Plan is for this patient to go to [facility]. LSW is following and we are awaiting precert that was started on 5/29. Remains on TPN. Will continue to follow along.      Electronically signed by Adilene Arellano RN on 5/30/2019 at 12:54 PM

## 2019-05-30 NOTE — PROGRESS NOTES
General Surgery Progress Note            PATIENT NAME: Clark Ledesma     TODAY'S DATE: 5/30/2019, 4:27 PM    SUBJECTIVE:    Pt  Seen and examined. OBJECTIVE:   VITALS:  /66   Pulse 76   Temp 97.7 °F (36.5 °C) (Axillary)   Resp 16   Ht 5' (1.524 m)   Wt 122 lb 5.7 oz (55.5 kg)   SpO2 94%   BMI 23.90 kg/m²      INTAKE/OUTPUT:      Intake/Output Summary (Last 24 hours) at 5/30/2019 1627  Last data filed at 5/30/2019 1409  Gross per 24 hour   Intake 4033.41 ml   Output 2330 ml   Net 1703.41 ml                 CONSTITUTIONAL:  awake and alert. No acute distress  HEART:   Regular   LUNGS:   diminished  ABDOMEN:   Abdomen soft, minimally tender, non-distended. +BS. +BM. Incision intact.  Drain SS  EXTREMITIES:   1+ edema    Data:  CBC with Differential:    Lab Results   Component Value Date    WBC 15.0 05/30/2019    RBC 3.31 05/30/2019    RBC 3.66 05/23/2012    HGB 9.6 05/30/2019    HCT 29.5 05/30/2019     05/30/2019     05/23/2012    MCV 89.2 05/30/2019    MCH 28.9 05/30/2019    MCHC 32.4 05/30/2019    RDW 16.7 05/30/2019    METASPCT 1 05/25/2019    LYMPHOPCT 9 05/30/2019    MONOPCT 7 05/30/2019    MYELOPCT 2 05/25/2019    BASOPCT 0 05/30/2019    MONOSABS 1.05 05/30/2019    LYMPHSABS 1.35 05/30/2019    EOSABS 0.15 05/30/2019    BASOSABS 0.00 05/30/2019    DIFFTYPE NOT REPORTED 05/30/2019     BMP:    Lab Results   Component Value Date     05/30/2019    K 4.5 05/30/2019     05/30/2019    CO2 22 05/30/2019    BUN 12 05/30/2019    LABALBU 2.4 05/25/2019    LABALBU 4.6 05/17/2012    CREATININE <0.40 05/30/2019    CALCIUM 7.4 05/30/2019    GFRAA CANNOT BE CALCULATED 05/30/2019    LABGLOM CANNOT BE CALCULATED 05/30/2019    GLUCOSE 128 05/30/2019    GLUCOSE 87 05/21/2012         ASSESSMENT     Principal Problem:    Acute respiratory failure (HCC)  Active Problems:    Sepsis due to urinary tract infection (HCC)    Cystitis    Emphysematous cystitis    Septic shock (HCC)    Leukemoid reaction    Aspiration pneumonia of right lower lobe due to vomit (HCC)    MRSA carrier    Urinary retention    Abdominal distention    Elevated CEA    Elevated CA 19-9 level    Cholecystitis    CRP elevated    Elevated procalcitonin    Bandemia    Centrilobular emphysema (HCC)    Hemorrhage of rectum and anus    GI bleed    Anemia    Small bowel obstruction (HCC)    Anxiety disorder    Noncompliance    Chronic obstructive pulmonary disease, unspecified ([facility]    Bacteremia due to coagulase-negative Staphylococcus    Bradycardia    Fever  Resolved Problems:    * No resolved hospital problems. *  S/P repair of fascial dehiscence    Plan  1. DC Reglan  2. Increase tube feeding  3.  Up as tolerated        Reida Dire, DO [address]

## 2019-05-30 NOTE — PROGRESS NOTES
[facility] [address]   OCCUPATIONAL THERAPY MISSED TREATMENT NOTE   INPATIENT   Date: 19  Patient Name: Chloe Alexander       Room: 9758/1202-53  MRN: [id number]   Account #: [de-identified]    : 1950  (77 y.o.)  Gender: female   Referring Practitioner: Naz Ashby D.O  Diagnosis: Sepsis due to UTI             REASON FOR MISSED TREATMENT:  Patient unable to participate   -   Pt sleeping. Writer unable to rouse. Floresita Carmona RN aware.        KORINA Moralez

## 2019-05-31 VITALS
HEART RATE: 78 BPM | WEIGHT: 118.17 LBS | RESPIRATION RATE: 16 BRPM | HEIGHT: 60 IN | TEMPERATURE: 98.3 F | DIASTOLIC BLOOD PRESSURE: 67 MMHG | SYSTOLIC BLOOD PRESSURE: 128 MMHG | OXYGEN SATURATION: 95 % | BODY MASS INDEX: 23.2 KG/M2

## 2019-05-31 LAB
ABSOLUTE EOS #: 0.58 K/UL (ref 0–0.4)
ABSOLUTE IMMATURE GRANULOCYTE: ABNORMAL K/UL (ref 0–0.3)
ABSOLUTE LYMPH #: 0.72 K/UL (ref 1–4.8)
ABSOLUTE MONO #: 0.72 K/UL (ref 0.1–1.3)
ANION GAP SERPL CALCULATED.3IONS-SCNC: 9 MMOL/L (ref 9–17)
BASOPHILS # BLD: 0 % (ref 0–2)
BASOPHILS ABSOLUTE: 0 K/UL (ref 0–0.2)
BUN BLDV-MCNC: 16 MG/DL (ref 8–23)
BUN/CREAT BLD: ABNORMAL (ref 9–20)
CALCIUM SERPL-MCNC: 7.6 MG/DL (ref 8.6–10.4)
CHLORIDE BLD-SCNC: 100 MMOL/L (ref 98–107)
CO2: 25 MMOL/L (ref 20–31)
CREAT SERPL-MCNC: <0.4 MG/DL (ref 0.5–0.9)
DIFFERENTIAL TYPE: ABNORMAL
EOSINOPHILS RELATIVE PERCENT: 4 % (ref 0–4)
GFR AFRICAN AMERICAN: ABNORMAL ML/MIN
GFR NON-AFRICAN AMERICAN: ABNORMAL ML/MIN
GFR SERPL CREATININE-BSD FRML MDRD: ABNORMAL ML/MIN/{1.73_M2}
GFR SERPL CREATININE-BSD FRML MDRD: ABNORMAL ML/MIN/{1.73_M2}
GLUCOSE BLD-MCNC: 106 MG/DL (ref 65–105)
GLUCOSE BLD-MCNC: 112 MG/DL (ref 70–99)
GLUCOSE BLD-MCNC: 121 MG/DL (ref 65–105)
GLUCOSE BLD-MCNC: 83 MG/DL (ref 65–105)
GLUCOSE BLD-MCNC: 94 MG/DL (ref 65–105)
HCT VFR BLD CALC: 27.4 % (ref 36–46)
HEMOGLOBIN: 9 G/DL (ref 12–16)
IMMATURE GRANULOCYTES: ABNORMAL %
LYMPHOCYTES # BLD: 5 % (ref 24–44)
MAGNESIUM: 1.8 MG/DL (ref 1.6–2.6)
MCH RBC QN AUTO: 28.7 PG (ref 26–34)
MCHC RBC AUTO-ENTMCNC: 32.7 G/DL (ref 31–37)
MCV RBC AUTO: 88 FL (ref 80–100)
MONOCYTES # BLD: 5 % (ref 1–7)
MORPHOLOGY: ABNORMAL
MORPHOLOGY: ABNORMAL
NRBC AUTOMATED: ABNORMAL PER 100 WBC
PDW BLD-RTO: 16.9 % (ref 11.5–14.9)
PHOSPHORUS: 2.8 MG/DL (ref 2.6–4.5)
PLATELET # BLD: 380 K/UL (ref 150–450)
PLATELET ESTIMATE: ABNORMAL
PMV BLD AUTO: 8 FL (ref 6–12)
POTASSIUM SERPL-SCNC: 3.4 MMOL/L (ref 3.7–5.3)
RBC # BLD: 3.12 M/UL (ref 4–5.2)
RBC # BLD: ABNORMAL 10*6/UL
SEG NEUTROPHILS: 86 % (ref 36–66)
SEGMENTED NEUTROPHILS ABSOLUTE COUNT: 12.38 K/UL (ref 1.3–9.1)
SODIUM BLD-SCNC: 134 MMOL/L (ref 135–144)
WBC # BLD: 14.4 K/UL (ref 3.5–11)
WBC # BLD: ABNORMAL 10*3/UL

## 2019-05-31 PROCEDURE — 6360000002 HC RX W HCPCS: Performed by: INTERNAL MEDICINE

## 2019-05-31 PROCEDURE — 6370000000 HC RX 637 (ALT 250 FOR IP): Performed by: SURGERY

## 2019-05-31 PROCEDURE — 83735 ASSAY OF MAGNESIUM: CPT

## 2019-05-31 PROCEDURE — 84100 ASSAY OF PHOSPHORUS: CPT

## 2019-05-31 PROCEDURE — 2580000003 HC RX 258: Performed by: SURGERY

## 2019-05-31 PROCEDURE — 99232 SBSQ HOSP IP/OBS MODERATE 35: CPT | Performed by: INTERNAL MEDICINE

## 2019-05-31 PROCEDURE — 85025 COMPLETE CBC W/AUTO DIFF WBC: CPT

## 2019-05-31 PROCEDURE — 6360000002 HC RX W HCPCS: Performed by: STUDENT IN AN ORGANIZED HEALTH CARE EDUCATION/TRAINING PROGRAM

## 2019-05-31 PROCEDURE — 80048 BASIC METABOLIC PNL TOTAL CA: CPT

## 2019-05-31 PROCEDURE — 6370000000 HC RX 637 (ALT 250 FOR IP): Performed by: STUDENT IN AN ORGANIZED HEALTH CARE EDUCATION/TRAINING PROGRAM

## 2019-05-31 PROCEDURE — 99239 HOSP IP/OBS DSCHRG MGMT >30: CPT | Performed by: INTERNAL MEDICINE

## 2019-05-31 PROCEDURE — 2580000003 HC RX 258: Performed by: STUDENT IN AN ORGANIZED HEALTH CARE EDUCATION/TRAINING PROGRAM

## 2019-05-31 PROCEDURE — 82947 ASSAY GLUCOSE BLOOD QUANT: CPT

## 2019-05-31 PROCEDURE — 36415 COLL VENOUS BLD VENIPUNCTURE: CPT

## 2019-05-31 PROCEDURE — 6370000000 HC RX 637 (ALT 250 FOR IP): Performed by: INTERNAL MEDICINE

## 2019-05-31 RX ORDER — METOPROLOL TARTRATE 50 MG/1
50 TABLET, FILM COATED ORAL 2 TIMES DAILY
Qty: 60 TABLET | Refills: 3 | Status: SHIPPED | OUTPATIENT
Start: 2019-05-31

## 2019-05-31 RX ORDER — FUROSEMIDE 10 MG/ML
20 INJECTION INTRAMUSCULAR; INTRAVENOUS DAILY
Status: DISCONTINUED | OUTPATIENT
Start: 2019-05-31 | End: 2019-05-31 | Stop reason: HOSPADM

## 2019-05-31 RX ORDER — MIRTAZAPINE 15 MG/1
7.5 TABLET, ORALLY DISINTEGRATING ORAL NIGHTLY
Qty: 30 TABLET | Refills: 3 | DISCHARGE
Start: 2019-05-31 | End: 2019-07-25 | Stop reason: DRUGHIGH

## 2019-05-31 RX ORDER — AMIODARONE HYDROCHLORIDE 100 MG/1
100 TABLET ORAL 2 TIMES DAILY
Qty: 30 TABLET | Refills: 3 | Status: SHIPPED | OUTPATIENT
Start: 2019-05-31

## 2019-05-31 RX ORDER — LEVOTHYROXINE SODIUM 0.07 MG/1
75 TABLET ORAL DAILY
Qty: 30 TABLET | Refills: 3 | Status: SHIPPED | OUTPATIENT
Start: 2019-06-01

## 2019-05-31 RX ORDER — IPRATROPIUM BROMIDE AND ALBUTEROL SULFATE 2.5; .5 MG/3ML; MG/3ML
3 SOLUTION RESPIRATORY (INHALATION) 4 TIMES DAILY
Qty: 360 ML | Refills: 1 | DISCHARGE
Start: 2019-05-31 | End: 2019-07-25 | Stop reason: DRUGHIGH

## 2019-05-31 RX ADMIN — Medication 2 PUFF: at 09:53

## 2019-05-31 RX ADMIN — AMIODARONE HYDROCHLORIDE 100 MG: 100 TABLET ORAL at 09:53

## 2019-05-31 RX ADMIN — IRON SUCROSE 200 MG: 20 INJECTION, SOLUTION INTRAVENOUS at 10:08

## 2019-05-31 RX ADMIN — DEXTROSE AND SODIUM CHLORIDE: 5; 450 INJECTION, SOLUTION INTRAVENOUS at 09:55

## 2019-05-31 RX ADMIN — IPRATROPIUM BROMIDE AND ALBUTEROL SULFATE 1 AMPULE: .5; 3 SOLUTION RESPIRATORY (INHALATION) at 07:27

## 2019-05-31 RX ADMIN — FUROSEMIDE 20 MG: 10 INJECTION, SOLUTION INTRAMUSCULAR; INTRAVENOUS at 12:31

## 2019-05-31 RX ADMIN — LEVOTHYROXINE SODIUM 75 MCG: 75 TABLET ORAL at 05:27

## 2019-05-31 RX ADMIN — PANTOPRAZOLE SODIUM 40 MG: 40 TABLET, DELAYED RELEASE ORAL at 05:27

## 2019-05-31 RX ADMIN — METOPROLOL TARTRATE 50 MG: 50 TABLET ORAL at 09:58

## 2019-05-31 ASSESSMENT — ENCOUNTER SYMPTOMS
EYE REDNESS: 0
ABDOMINAL DISTENTION: 0
NAUSEA: 0
VOMITING: 0
ABDOMINAL PAIN: 1
EYE DISCHARGE: 0
STRIDOR: 0
APNEA: 0
COUGH: 0
DIARRHEA: 0
CONSTIPATION: 0
CHEST TIGHTNESS: 0
WHEEZING: 0
SORE THROAT: 0
SHORTNESS OF BREATH: 0

## 2019-05-31 NOTE — PROGRESS NOTES
Writer spoke with dr. Soraya Peña, he is ok with d/c for today, increase tube feed to 40 and wean TPN

## 2019-05-31 NOTE — PROGRESS NOTES
DATE: 2019    NAME: Alverto Stark  MRN: [id number]   : 1950    Patient not seen this date for Physical Therapy due to:  [] Blood transfusion in progress  [] Hemodialysis  []  Patient Declined  [] Spine Precautions   [] Strict Bedrest  [] Surgery/ Procedure  [] Testing      [x] Other Pt. Refused, stating that she is leaving. PTA notified nurse Ara Grijalva said patient isn't leaving for a while and should get up. PTA tried to convince patient again, stating that patient is not leaving for a while and therapy and nursing staff think it would be beneficial for her to get up. Patient stated that she has been up since 4am and she is not doing therapy today. PTA suggested that patient notify her nurse before 4pm, if she changes her mind and would like to participate today. [] PT being discontinued at this time. Patient independent. No further needs. [] PT being discontinued at this time as the patient has been transferred to palliative care. No further needs.     Mell Romero PTA

## 2019-05-31 NOTE — PROGRESS NOTES
Nutrition Assessment (Enteral Nutrition)    Type and Reason for Visit: Reassess    Nutrition Recommendations: TPN being discontinued after this bag with plan to discharge pt today, Vital AF increased to 45 ml/hr. Nutrition Assessment: PO intakes continue to be poor but tolerating Vital AF at 30 ml to be increased to 45 ml/hr. Nurse reports pt is to be discharged and TPN discontinued, Shruthi Woods had her decrease TPN to 35 ml then in 3-4 hours stop it. Malnutrition Assessment:  · Malnutrition Status: At risk for malnutrition  · Context: Acute illness or injury  · Findings of the 6 clinical characteristics of malnutrition (Minimum of 2 out of 6 clinical characteristics is required to make the diagnosis of moderate or severe Protein Calorie Malnutrition based on AND/ASPEN Guidelines):  1. Energy Intake-Greater than 75% of estimated energy requirement, (Variable at times; over 75% of needs met for majority of admission)    2. Weight Loss-Unable to assess(edema present),    3. Fat Loss-Unable to assess(Pt is thin; edema present; no known recent loss of muscle or fat),    4. Muscle Loss-Unable to assess,    5. Fluid Accumulation-Moderate to severe fluid accumulation, Extremities, Generalized  6.  Strength-Not measured    Nutrition Risk Level: Moderate    Nutrition Needs:  · Estimated Daily Total Kcal: 6353-0781 based on 35-39 kcal per kg of usual body wt  · Estimated Daily Protein (g): 63-68 based on 1.4-1.5 gm/kg IBW(revised)    Nutrition Diagnosis:   · Problem: Inadequate oral intake  · Etiology: related to Insufficient energy/nutrient consumption, Alteration in GI function     Signs and symptoms:  as evidenced by Intake 0-25%, Nutrition support - PN    Objective Information:  · Nutrition-Focused Physical Findings: +3 pitting LUE edema.  Diarrhea  · Wound Type: Deep Tissue Injury, Multiple, Pressure Ulcer, Stage I, Stage II, Surgical Wound  · Current Nutrition Therapies:  · Oral Diet Orders: Dental Soft

## 2019-05-31 NOTE — PROGRESS NOTES
Sutures removed from [facility] site ( #1). Small opening still present at [address] [place] [address] Ave site.

## 2019-05-31 NOTE — FLOWSHEET NOTE
Patient welcomed visit and prayer and was introspective as she waits on medical update and doctor visit; patient willing to work hard to get her strength back so she can eventually return home; listening presence and support;      05/31/19 1228   Encounter Summary   Services provided to: Patient   Referral/Consult From: Palliative Care   Continue Visiting   (5/31/19)   Complexity of Encounter Moderate   Length of Encounter 15 minutes   Spiritual Assessment Completed Yes   Grief and Life Adjustment   Type Adjustment to illness   Assessment Approachable;Passive; Hopeful   Intervention Active listening;Prayer;Sustaining presence/ Ministry of presence; Discussed illness/injury and it's impact   Outcome Expressed gratitude;Engaged in conversation;Expressed feelings/needs/concerns; Hopeful;Receptive

## 2019-05-31 NOTE — PROGRESS NOTES
non-albicans and one colony of ESBL Klebsiella. PICC line was placed   Tagged RBC scan  was negative . CT abdomen 5/5 showed no fluid collection ,Bowel obstruction which is suspected to be caused by an internal hernia. NG tube was placed under fluoroscopy 5/9. She had a small bowel follow-through 5/9, developed respiratory failure with hypoxia, tachycardia and tachypnea was transferred to ICU placed on BiPAP, blood pressure on the low side required Levophed,WBC up to 28.5    code status was changed to [facility] on 5/13. S/p right colectomy with ileocolic anastomosis,open cholecystectomy and excision of small bowel diverticulum 5/15 . Interval history :  She was extubated 5/20. No new events   Better   She had low-grade fever of 100.2 on 5/28, no fever today. The patient had a PICC line in the right arm ,receiving TPN  External urinary catheter in place  5/29 Chest x-ray showed Enlarging left pleural effusion. and urinalysis unremarkable. Wbc 14 today   Pro calcitonin 0.12 level and lactic acid nl.           Past Medical History:   Diagnosis Date    Abnormal computed tomography of cervical spine     sclerotic bone appearance     CVA (cerebral vascular accident) ([address]     left  side weakness    GERD (gastroesophageal reflux disease)     Hypertension     Paraproteinemia     Weight loss       Past Surgical History:   Procedure Laterality Date    ABDOMEN SURGERY N/A 5/25/2019    ABDOMEN DEBRIDEMENT WOUND CLOSURE REOPENING OF RECENT LAPOROATOMY, ABDOMINAL WASHOUT, REPAIR FASCIAL DEHISENCE WITH MESH performed by Sparkle Escoto DO at [facility] 5/15/2019    CHOLECYSTECTOMY performed by Sparkle Escoto DO at [address] 5/25/2019    GASTROSTOMY TUBE PLACEMENT performed by Sparkle Escoto DO at [facility]  4/14/2019         LAPAROSCOPY N/A 5/15/2019    LAPAROSCOPY EXPLORATORY CONVERTED TO EXPLORATORY LAPAROTOMY/ RIGHT COLON RESECTION AND ANASTAMOSIS/ OPEN CHOLECYSTECTOMY/ EXTENSIVE LYSIS OF ADHESIONS performed by Mikie Almanzar DO at [facility] 10  07/2011    Pacemaker is Medtronic Revo (compatible). Leads placed in 1995 are NOT MRI compatible. Placed at [address]. V's per Dr. Corrie Samaniego can not have an MRI.  UPPER GASTROINTESTINAL ENDOSCOPY N/A 4/16/2019    EGD ESOPHAGOGASTRODUODENOSCOPY @ BEDSIDE  ICU 2002 performed by Sierra Padgett MD at [address]           Admission Meds  No current facility-administered medications on file prior to encounter. Current Outpatient Medications on File Prior to Encounter   Medication Sig Dispense Refill    oxyCODONE-acetaminophen (PERCOCET) 5-325 MG per tablet Take 1 tablet by mouth every 6 hours as needed for Pain.  budesonide-formoterol (SYMBICORT) 160-4.5 MCG/ACT AERO Inhale 2 puffs into the lungs 2 times daily      omeprazole (PRILOSEC) 20 MG capsule Take 20 mg by mouth daily.  Misc. Devices [facility]) [address] [place] wheelchair with left fupper extremity support  Dx: stroke with left hemiparesis. 1 each 0           Allergies  Allergies   Allergen Reactions    Penicillins Shortness Of Breath and Rash    Amoxicillin         Social   Social History     Tobacco Use    Smoking status: Current Some Day Smoker     Packs/day: 1.00     Years: 30.00     Pack years: 30.00    Smokeless tobacco: Never Used   Substance Use Topics    Alcohol use: Not Currently     Alcohol/week: 16.8 oz     Types: 28 Glasses of wine per week                History reviewed. No pertinent family history. Review of Systems  Other than above 10 systems reviewed negative    Tolerating antibiotics. Physical Exam  /63   Pulse 83   Temp 98.2 °F (36.8 °C) (Oral)   Resp 18   Ht 5' (1.524 m)   Wt 118 lb 2.7 oz (53.6 kg)   SpO2 96%   BMI 23.08 kg/m²           General Appearance: not under distress  .   Skin: warm and dry, no rash or erythema  Head: normocephalic and Cholelithiasis redemonstrated. No gallbladder wall thickening or biliary ductal dilatation. Scattered tiny hypodense lesions in the liver are too small to characterize but statistically represent benign cysts or hemangiomas and appear unchanged. The pancreas, spleen, adrenal glands, and kidneys are unremarkable. There is no hydronephrosis or urinary tract calculus. GI/Bowel: The stomach is distended. Enteric tube is in place. No contrast is seen distal to the pylorus and there is contrast reflux into the distal esophagus. There is no evidence of bowel obstruction. The appendix is not definitely visualized. No focal pericecal inflammatory changes are evident. Pelvis: The urinary bladder is decompressed by Tran catheter. No pelvic mass is seen. Peritoneum/Retroperitoneum: Small amount of free fluid in the pelvic cavity. No free air or focal fluid collection. No abnormal lymph node. Normal abdominal aortic caliber. Moderate calcific atherosclerosis. Bones/Soft Tissues: No acute osseous abnormality. Diffuse anasarca. Moderate degenerative changes in the lumbar spine. 1. Distended, contrast filled stomach with reflux of contrast into the esophagus. No enteric contrast is seen distal to the pylorus suggesting delayed gastric emptying in the setting of ileus. 2. New moderate layering bilateral pleural effusions with bilateral lower lobe atelectasis. 3. Small amount of nonspecific free fluid in the pelvic cavity. 4. Anasarca. 5. Cholelithiasis.      Xr Chest (single View Frontal)    Result Date: 4/13/2019  EXAMINATION: SINGLE XRAY VIEW OF THE CHEST 4/13/2019 7:18 am COMPARISON: April 12, 2019 HISTORY: ORDERING SYSTEM PROVIDED HISTORY: dyspnea TECHNOLOGIST PROVIDED HISTORY: dyspnea Ordering Physician Provided Reason for Exam: dyspnea Acuity: Acute Type of Exam: Subsequent/Follow-up Additional signs and symptoms: dyspnea FINDINGS: A right IJ catheter is seen with its tip terminating at the superior cavoatrial junction. The left chest wall pacemaker and leads are stable. The cardiomediastinal silhouette is stable. There is interval increased opacity at the right lung base, may be related to atelectasis versus pneumonia. There is small atelectasis and mild pleural effusion at the left lung base. There is no pneumothorax. There is no acute osseous abnormality. Interval increased opacity at the right lung base, may be related to atelectasis versus pneumonia. Small atelectasis and mild pleural effusion at the left lung, new since the prior study. Xr Femur Left (min 2 Views)    Result Date: 3/27/2019  EXAMINATION: 4 XRAY VIEWS OF THE LEFT FEMUR; 2 XRAY VIEWS OF THE LEFT KNEE; 3 XRAY VIEWS OF THE LEFT TIBIA AND FIBULA 3/27/2019 7:00 pm COMPARISON: Left hip 11/14/2015. HISTORY: ORDERING SYSTEM PROVIDED HISTORY: pain TECHNOLOGIST PROVIDED HISTORY: pain Ordering Physician Provided Reason for Exam: pt twisted wrong, lt knee pain, unable to straighten knee. Acuity: Acute Type of Exam: Initial FINDINGS: Left femur, four views: The bones are diffusely osteopenic. No acute fracture deformity. The hip joint is maintained. The femoral head projects within the acetabulum. No focal soft tissue abnormality is seen. Left knee, two views: The knee is flexed. No acute osseous abnormality. No joint effusion. Joint spaces are not optimally profiled but no significant arthritic changes are apparent. Left tib-fib, three views: There is evidence of a remote healed distal tibia fracture. No acute fracture or dislocation is seen. No focal soft tissue abnormality. No acute osseous abnormality of the left femur. No acute osseous abnormality of the left knee. No acute osseous abnormality of the left tib-fib. Healed remote distal tibia fracture. Marked osteopenia, likely due to disuse.      Xr Knee Left (1-2 Views)    Result Date: 3/27/2019  EXAMINATION: 4 XRAY VIEWS OF THE LEFT FEMUR; 2 XRAY VIEWS OF THE LEFT KNEE; 3 XRAY VIEWS OF THE LEFT TIBIA AND FIBULA 3/27/2019 7:00 pm COMPARISON: Left hip 11/14/2015. HISTORY: ORDERING SYSTEM PROVIDED HISTORY: pain TECHNOLOGIST PROVIDED HISTORY: pain Ordering Physician Provided Reason for Exam: pt twisted wrong, lt knee pain, unable to straighten knee. Acuity: Acute Type of Exam: Initial FINDINGS: Left femur, four views: The bones are diffusely osteopenic. No acute fracture deformity. The hip joint is maintained. The femoral head projects within the acetabulum. No focal soft tissue abnormality is seen. Left knee, two views: The knee is flexed. No acute osseous abnormality. No joint effusion. Joint spaces are not optimally profiled but no significant arthritic changes are apparent. Left tib-fib, three views: There is evidence of a remote healed distal tibia fracture. No acute fracture or dislocation is seen. No focal soft tissue abnormality. No acute osseous abnormality of the left femur. No acute osseous abnormality of the left knee. No acute osseous abnormality of the left tib-fib. Healed remote distal tibia fracture. Marked osteopenia, likely due to disuse. Xr Knee Left (3 Views)    Result Date: 3/30/2019  EXAMINATION: 3 XRAY VIEWS OF THE LEFT KNEE 3/30/2019 10:58 am COMPARISON: March 27, 2018 HISTORY: ORDERING SYSTEM PROVIDED HISTORY: F/u L knee pain after cast TECHNOLOGIST PROVIDED HISTORY: AP, oblique, lateral F/u L knee pain after cast FINDINGS: Marked osteopenia. Interval casting, which degrades fine osseous detail question cortical offset in the lateral femoral metaphysis. No malalignment identified. No significant joint effusion. Interval casting. Question nondisplaced fracture in the lateral femoral metaphysis. Osteopenia. Xr Tibia Fibula Left (2 Views)    Result Date: 3/27/2019  EXAMINATION: 4 XRAY VIEWS OF THE LEFT FEMUR; 2 XRAY VIEWS OF THE LEFT KNEE; 3 XRAY VIEWS OF THE LEFT TIBIA AND FIBULA 3/27/2019 7:00 pm COMPARISON: Left hip 11/14/2015.  HISTORY: upper mid abdomen is noted but there is gas and fecal material in the rectum. Gastric outlet obstruction or proximal small bowel partial obstruction is suspected. No free air is noted. Midline city is present over the pelvis likely a monitor or CT small bore catheter. Vascular calcification is present in the pelvis. Persistent preferential gaseous distension of the stomach although there is some gas in the upper abdomen and gas and fecal material present in the rectosigmoid. Findings suggest gastric outlet obstruction. Ct Abdomen Pelvis W Iv Contrast    Result Date: 4/11/2019  EXAMINATION: CT OF THE ABDOMEN AND PELVIS WITH CONTRAST 4/11/2019 5:14 pm TECHNIQUE: CT of the abdomen and pelvis was performed with the administration of intravenous contrast. Multiplanar reformatted images are provided for review. Dose modulation, iterative reconstruction, and/or weight based adjustment of the mA/kV was utilized to reduce the radiation dose to as low as reasonably achievable. COMPARISON: None. HISTORY: ORDERING SYSTEM PROVIDED HISTORY: Abdominal pain TECHNOLOGIST PROVIDED HISTORY: IV Only Contrast Ordering Physician Provided Reason for Exam: patient c/o abd pain for an hour FINDINGS: Lower Chest: Trace pleural fluid bilaterally is noted. Indwelling cardiac pacemaker is present. Heart size is normal.  Coronary artery calcifications are evident. The esophagus is significantly dilated and fluid-filled. No paraesophageal adenopathy is evident. Organs: The spleen, pancreas, and adrenals are unremarkable. The liver contains hypodensities, likely cysts. The gallbladder is distended and contains a solitary gallstone. The kidneys excrete contrast bilaterally. Extrarenal collecting systems are noted. The ureters are mildly dilated down to the urinary bladder without evidence of intraluminal or ureteral obstructing calculi.  GI/Bowel: Marked distention of the stomach is noted; this continues to the pylorus with appearance suggesting partial gastric outlet obstruction. Fluid is present in some small bowel loops and colon distal to the stomach. No small bowel obstruction. Pelvis: Marked distention of the urinary bladder is noted. 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 Distended ureters may be related to reflux or infection. No free pelvic fluid, pelvic or inguinal adenopathy is noted. Peritoneum/Retroperitoneum: No aortic aneurysm. Mild to moderate plaque is noted in the infrarenal abdominal aorta and both proximal iliac arteries. Shotty lymph nodes are present around the aorta in the upper abdomen. No mesenteric adenopathy is noted. Bones/Soft Tissues: Degenerative changes are present in the hips and lower lumbar facets. Estimated biologic radiation dose for this procedure:258.77 mGy/cm2.     1. Dilatation of the thoracic esophagus filled with fluid. No obstructive process is noted. No adjacent enlarged lymph nodes. 2. Trace pleural fluid. 3. Marked distention of the stomach. This appears to extend to the pylorus. Partial gastric outlet obstruction is not excluded. 4. Bilateral dilated ureters without obstructing calculi. Urinary bladder is markedly distended with bladder wall air suggesting emphysematous cystitis. Dilatation of the ureters may be related to reflux or infection. 5. Atherosclerotic disease. 6. Other findings as above. Critical results were called by Dr. Toña Grover MD to [address] on 4/11/2019 at 17:37.      Xr Chest Portable    Result Date: 4/16/2019  EXAMINATION: SINGLE XRAY VIEW OF THE CHEST 4/16/2019 6:54 am COMPARISON: 04/15/2019, 610 hours HISTORY: ORDERING SYSTEM PROVIDED HISTORY: ETT placement TECHNOLOGIST PROVIDED HISTORY: ETT placement Ordering Physician Provided Reason for Exam: on vent Acuity: Acute Type of Exam: Initial 70-year-old female on ventilator; check endotracheal tube placement FINDINGS: Portable AP upright view of the chest. Endotracheal tube distal tip overlying the mid trachea approximately 4.1 cm above the level of the orn. Enteric tube traverses the GE junction with distal tip excluded from the field of view. Left subclavian approach cardiac pacemaker device distal lead tips relatively stable in position. Right internal jugular approach central venous catheter distal tip overlying the high right atrium, stable. Cardiac monitor leads overlie the chest. Atherosclerotic calcification of the thoracic aorta. Slight stable volume loss of the left hemithorax. No pneumothorax. No free air. Dense retrocardiac/left basilar airspace consolidation and small left-sided pleural effusion. Stable mild focal opacity at the right mid lung zone. Underlying COPD. Stable mild pulmonary vascular congestion and left-sided predominant parahilar opacity. Visualized osseous structures remain unchanged. 1. Stable multifocal airspace disease as detailed above with dense retrocardiac/left basilar airspace consolidation and small left-sided pleural effusion. Mild pulmonary vascular congestion. Findings may represent edema or multifocal pneumonia. 2. Underlying COPD. 3. Tubes and line as detailed above. Xr Chest Portable    Result Date: 4/15/2019  EXAMINATION: SINGLE XRAY VIEW OF THE CHEST 4/15/2019 6:47 am COMPARISON: 14 April 2019 HISTORY: ORDERING SYSTEM PROVIDED HISTORY: ETT placement TECHNOLOGIST PROVIDED HISTORY: ETT placement Ordering Physician Provided Reason for Exam: on vent Acuity: Acute Type of Exam: Initial FINDINGS: AP portable view of the chest time stamped at 612 hours demonstrates overlying cardiac monitoring electrodes. Endotracheal tube terminates 4 cm above the ron. Bipolar pacemaker enters from the left with intact leads in appropriate positions. Intestinal tube extends beyond the fundus of the stomach, tip not included. Right internal jugular catheter terminates at the cavoatrial junction.   Heart size is normal. Aortic arch is calcified. There is interval improvement in vascular congestion with resolution of perihilar opacities. Some bibasilar opacities remain. No extrapleural air is noted. Osseous structures are stable. Interval improvement in vascular congestion and bilateral opacities consistent with resolving pulmonary edema. Tubes and lines as above. Xr Chest Portable    Result Date: 4/14/2019  EXAMINATION: SINGLE XRAY VIEW OF THE CHEST 4/14/2019 7:57 am COMPARISON: Portable chest 04/13/2019. HISTORY: ORDERING SYSTEM PROVIDED HISTORY: Intubation TECHNOLOGIST PROVIDED HISTORY: Intubation Ordering Physician Provided Reason for Exam: intubation Acuity: Acute Type of Exam: Initial Additional signs and symptoms: intubation FINDINGS: Endotracheal tube terminates over the midthoracic trachea. Dual-chamber pacemaker leads appear unchanged in position. Right IJ approach central venous catheter unchanged in position. Heart size not substantially changed. Perihilar and basilar opacities further increased. Left pleural effusion increased in size. Findings may reflect pulmonary edema, progressed from yesterday's exam.  Left pleural effusion increased in size. Xr Chest Portable    Result Date: 4/12/2019  EXAMINATION: SINGLE XRAY VIEW OF THE CHEST 4/12/2019 5:26 am COMPARISON: November 14, 2015. HISTORY: ORDERING SYSTEM PROVIDED HISTORY: line placement TECHNOLOGIST PROVIDED HISTORY: line placement Ordering Physician Provided Reason for Exam: New right side line placement. Acuity: Acute Type of Exam: Initial Additional signs and symptoms: New right side line placement. FINDINGS: Stable left pectoral trans venous cardiac pacer device. New right IJ central venous catheter with tip near the superior atrial caval junction. Normal lung volume. No new consolidation. Curvilinear radiopacity projecting over the right upper lobe likely represents artifact from a skin fold. No pleural effusion or pneumothorax. Stable cardiomediastinal silhouette and great vessels with redemonstration of atherosclerotic thoracic aorta. New right IJ central venous catheter with tip near the superior atrial caval junction. No pneumothorax. No new consolidation. Thank you for allowing me to participate in the care of your patient. Please feel free to contact me with any questions or concerns.      Nacho Perez MD

## 2019-05-31 NOTE — FLOWSHEET NOTE
· Facts: Patient stated she is being discharged to rehab today and said she is ready after her long stay here. · Feelings: Patient did smile slightly today. She also said she made up her mind she was going to eat.     · Adrianne: Patient receptive to spiritual care visit and prayer        05/31/19 1420   Encounter Summary   Services provided to: Patient   Referral/Consult From: [facility]   (5/31/19)   Complexity of Encounter Low   Length of Encounter 15 minutes   Routine   Type Follow up   Assessment Approachable   Intervention Active listening;Explored feelings, thoughts, concerns;Prayer;Sustaining presence/ Ministry of presence   Outcome Expressed feelings/needs/concerns;Receptive

## 2019-05-31 NOTE — PROGRESS NOTES
[address] [facility].    PROGRESS NOTE             5/31/2019    10:43 AM    Name:   Mo Gonsalez  MRN:     [id number]     Acct:      [de-identified]   Room:   2048/2048-01  IP Day:  48  Admit Date:  4/11/2019  2:48 PM    PCP:  Maggy Gates DO  Code Status:  Full Code    Subjective: Interval History Status: improved. Patient seen and examined at bedside in the ICU. Afebrile, VSS. Patient appears better today visually. Alert and awake. Overnight apparently the patient was found pulling at lines. Mitts were placed accordingly and was redirected. RUQ pain has significantly decreased - HIDA scan yesterday was not able to visualize the GB. WBC trending down. Afebrile. Tachycardic into the 150's overnight, required Amiodarone drip and an increase in PRN lopressor. This AM HR <102, SBP controlled. I do not believe she is medically stable enough still at this time for the OR. Repeat Blood cx -ve @ 3 days    CXR stable in interval.     Prealbumin 11.8       5/28    pst op long icu course     S/p open ashley, ex lap, R colectomy w/ ileocolic anastomosis, excision small bowel diverticulum, extensive enterolysis. Intubated on vent, low pressor requirement, worsening edema, on solumedrol and albumin support. Extubated on 5/20. Edema improving, feeding transitioning to NGT.          5/29 still has abd pain      Low grade fever    u/a neg    no nuasea/ vomiting    eating a lttle      5/31   feels better   no fever      Brief History:     Per EMR:     The patient is a 77 y.o.  Female who presents withAbdominal Pain   and she is admitted to the hospital for the management of abdominal distension, nausea, vomiting, and acute cystitis.      Patient presents with chills, nausea, vomiting, abdominal pain (diffuse, but worse in the suprapubic region), abdominal distension, and dysuria of 2-3 days duration. Denies hematemesis.  Acknowledges (Standard) 65 mL/hr at 19 1852    dextrose 5 % and 0.45 % NaCl 25 mL/hr at 19 0955    dextrose       PRN Meds: potassium chloride **OR** potassium alternative oral replacement **OR** potassium chloride, acetaminophen, fentanNYL **OR** fentanNYL, LORazepam, glucose, dextrose, glucagon (rDNA), dextrose, sodium phosphate IVPB **OR** sodium phosphate IVPB, sodium chloride flush, ondansetron, potassium chloride, magnesium sulfate    Data:     Past Medical History:   has a past medical history of Abnormal computed tomography of cervical spine, CVA (cerebral vascular accident) ([address] GERD (gastroesophageal reflux disease), Hypertension, Paraproteinemia, and Weight loss. Social History:   reports that she has been smoking. She has a 30.00 pack-year smoking history. She has never used smokeless tobacco. She reports that she drank about 16.8 oz of alcohol per week. She reports that she does not use drugs. Family History: History reviewed. No pertinent family history. Vitals:  /63   Pulse 83   Temp 98.2 °F (36.8 °C) (Oral)   Resp 18   Ht 5' (1.524 m)   Wt 118 lb 2.7 oz (53.6 kg)   SpO2 96%   BMI 23.08 kg/m²   Temp (24hrs), Av.3 °F (36.8 °C), Min:97.7 °F (36.5 °C), Max:99.1 °F (37.3 °C)    Recent Labs     19  1120 19  1727 19  2346 19  0534   POCGLU 124* 108* 122* 121*       I/O(24Hr):     Intake/Output Summary (Last 24 hours) at 2019 1043  Last data filed at 2019 0710  Gross per 24 hour   Intake 2357 ml   Output 2340 ml   Net 17 ml       Labs:    [unfilled]    Lab Results   Component Value Date/Time    SPECIAL R AC 5CC PURPLE 3CC RED 2019 12:20 PM     Lab Results   Component Value Date/Time    CULTURE NO GROWTH 6 DAYS 2019 12:20 PM       [unfilled]    Radiology:    Kristal Stains Femur Left (min 2 Views)    Result Date: 3/27/2019  EXAMINATION: 4 XRAY VIEWS OF THE LEFT FEMUR; 2 XRAY VIEWS OF THE LEFT KNEE; 3 XRAY VIEWS OF THE LEFT TIBIA AND FIBULA 3/27/2019 7:00 pm COMPARISON: Left hip 11/14/2015. HISTORY: ORDERING SYSTEM PROVIDED HISTORY: pain TECHNOLOGIST PROVIDED HISTORY: pain Ordering Physician Provided Reason for Exam: pt twisted wrong, lt knee pain, unable to straighten knee. Acuity: Acute Type of Exam: Initial FINDINGS: Left femur, four views: The bones are diffusely osteopenic. No acute fracture deformity. The hip joint is maintained. The femoral head projects within the acetabulum. No focal soft tissue abnormality is seen. Left knee, two views: The knee is flexed. No acute osseous abnormality. No joint effusion. Joint spaces are not optimally profiled but no significant arthritic changes are apparent. Left tib-fib, three views: There is evidence of a remote healed distal tibia fracture. No acute fracture or dislocation is seen. No focal soft tissue abnormality. No acute osseous abnormality of the left femur. No acute osseous abnormality of the left knee. No acute osseous abnormality of the left tib-fib. Healed remote distal tibia fracture. Marked osteopenia, likely due to disuse. Xr Knee Left (1-2 Views)    Result Date: 3/27/2019  EXAMINATION: 4 XRAY VIEWS OF THE LEFT FEMUR; 2 XRAY VIEWS OF THE LEFT KNEE; 3 XRAY VIEWS OF THE LEFT TIBIA AND FIBULA 3/27/2019 7:00 pm COMPARISON: Left hip 11/14/2015. HISTORY: ORDERING SYSTEM PROVIDED HISTORY: pain TECHNOLOGIST PROVIDED HISTORY: pain Ordering Physician Provided Reason for Exam: pt twisted wrong, lt knee pain, unable to straighten knee. Acuity: Acute Type of Exam: Initial FINDINGS: Left femur, four views: The bones are diffusely osteopenic. No acute fracture deformity. The hip joint is maintained. The femoral head projects within the acetabulum. No focal soft tissue abnormality is seen. Left knee, two views: The knee is flexed. No acute osseous abnormality. No joint effusion. Joint spaces are not optimally profiled but no significant arthritic changes are apparent.  Left tib-fib, dislocation is seen. No focal soft tissue abnormality. No acute osseous abnormality of the left femur. No acute osseous abnormality of the left knee. No acute osseous abnormality of the left tib-fib. Healed remote distal tibia fracture. Marked osteopenia, likely due to disuse. Ct Abdomen Pelvis W Iv Contrast    Result Date: 4/11/2019  EXAMINATION: CT OF THE ABDOMEN AND PELVIS WITH CONTRAST 4/11/2019 5:14 pm TECHNIQUE: CT of the abdomen and pelvis was performed with the administration of intravenous contrast. Multiplanar reformatted images are provided for review. Dose modulation, iterative reconstruction, and/or weight based adjustment of the mA/kV was utilized to reduce the radiation dose to as low as reasonably achievable. COMPARISON: None. HISTORY: ORDERING SYSTEM PROVIDED HISTORY: Abdominal pain TECHNOLOGIST PROVIDED HISTORY: IV Only Contrast Ordering Physician Provided Reason for Exam: patient c/o abd pain for an hour FINDINGS: Lower Chest: Trace pleural fluid bilaterally is noted. Indwelling cardiac pacemaker is present. Heart size is normal.  Coronary artery calcifications are evident. The esophagus is significantly dilated and fluid-filled. No paraesophageal adenopathy is evident. Organs: The spleen, pancreas, and adrenals are unremarkable. The liver contains hypodensities, likely cysts. The gallbladder is distended and contains a solitary gallstone. The kidneys excrete contrast bilaterally. Extrarenal collecting systems are noted. The ureters are mildly dilated down to the urinary bladder without evidence of intraluminal or ureteral obstructing calculi. GI/Bowel: Marked distention of the stomach is noted; this continues to the pylorus with appearance suggesting partial gastric outlet obstruction. Fluid is present in some small bowel loops and colon distal to the stomach. No small bowel obstruction. Pelvis: Marked distention of the urinary bladder is noted.   There is air in the urinary from a skin fold. No pleural effusion or pneumothorax. Stable cardiomediastinal silhouette and great vessels with redemonstration of atherosclerotic thoracic aorta. New right IJ central venous catheter with tip near the superior atrial caval junction. No pneumothorax. No new consolidation. Physical Examination:        Physical Exam   Constitutional: She appears well-developed. She appears cachectic. She appears ill. No distress. Intubated   HENT:   Head: Normocephalic and atraumatic. Eyes: Pupils are equal, round, and reactive to light. Conjunctivae and EOM are normal.   Neck: Normal range of motion. Right IJ central line in place. Site clean and dry. Cardiovascular: Normal rate, regular rhythm, normal heart sounds and intact distal pulses. Exam reveals no gallop and no friction rub. No murmur heard. Pulmonary/Chest: Effort normal. No stridor. No respiratory distress. She has no wheezes. She has no rales. Intubated and mechanically ventilated. Abdominal: Soft. Bowel sounds are normal. She exhibits no mass. There is tenderness (Decreased tenderness to light palpation. Improvement from prior. ). There is guarding. There is no rebound. RUQ tenderness    Musculoskeletal: Normal range of motion. She exhibits no edema (Anasarca; 3+ b/l pitting edema in upper and lower ext. ). Left knee wrapped in dressing. Posterior bruising noted. Neurological:   Intubated, sedated   Skin: Skin is warm and dry. Capillary refill takes less than 2 seconds. She is not diaphoretic. No pallor. Nursing note and vitals reviewed.     Assessment:        Primary Problem  Acute respiratory failure [facility])    Active Hospital Problems    Diagnosis Date Noted    Fever [R50.9]     Bradycardia [R00.1]     Bacteremia due to coagulase-negative Staphylococcus [R78.81]     Chronic obstructive pulmonary disease, unspecified ([facility] [address] [J44.9] 05/24/2019    Acute respiratory failure ([facility] [address] [J96.00] 05/18/2019    Small bowel 100    bp 146    Add lopressor   on amioderone po      k 3.7   cr nl      5/29      Post op ashley   low grade fever   still has a lot aof abd pain     [poor oral intake   on tpn   g tube clamped    Tolerating some po intake      kub   neg   ua neg       check cxr      afib vr 89     140/80     increase lopressor 50 mg bid   on amioderone                5/30       Mild overload     pleural effusion       lasix 20 mg    off ivf   on tpn   poor oral intake   tf at 10 ml    ADVANCE    AFIV VR CONTROLLED   BP CONTROLLED   CONT LOPRESSOR/ AMIODERONE         5/31     still on tpn       sob better   feels better   af vr controlled      no chf     No uri   no fever   wbc 14    Resolving sepsis          Andres Macario MD  5/31/2019  10:43 AM   Isabelle Kapoor  Electronically signed by Andres Macario MD on 5/31/2019 at 10:43 AM

## 2019-05-31 NOTE — CARE COORDINATION
DISCHARGE PLANNING NOTE:    Plan is for this patient to go to [facility]. Precert obtained and LSW following. Awaiting recs from surgery in regards to TPN and tube feedings. Will continue to follow.      Electronically signed by Gail Monahan RN on 5/31/2019 at 11:25 AM

## 2019-05-31 NOTE — PLAN OF CARE
Problem: Risk for Impaired Skin Integrity  Goal: Tissue integrity - skin and mucous membranes  Description  Structural intactness and normal physiological function of skin and  mucous membranes. 5/31/2019 0540 by Macey Escobedo RN  Outcome: Ongoing  Note:   Skin assessment as charted. No new areas of breakdown. Problem: Falls - Risk of:  Goal: Will remain free from falls  Description  Will remain free from falls  5/31/2019 0540 by Macey Escobedo RN  Outcome: Ongoing  Note:   No falls this shift. Call light within reach and siderails x2. Bed in lowest position. Patient safety maintained. Problem: Falls - Risk of:  Goal: Absence of physical injury  Description  Absence of physical injury  5/31/2019 0540 by Macey Escobedo RN  Outcome: Ongoing  Note:   No falls this shift. Call light within reach and siderails x2. Bed in lowest position. Patient safety maintained. Problem: Infection, Septic Shock:  Goal: Will show no infection signs and symptoms  Description  Will show no infection signs and symptoms  5/31/2019 0540 by Macey Escobedo RN  Outcome: Ongoing  Note:   Patient remains free from infection and no signs and symptoms of infection. Problem: Tissue Perfusion, Altered:  Goal: Circulatory function within specified parameters  Description  Circulatory function within specified parameters  5/31/2019 0540 by Macey Escobedo RN  Outcome: Ongoing     Problem: Pain:  Goal: Pain level will decrease  Description  Pain level will decrease  5/31/2019 0540 by Macey Escobedo RN  Outcome: Ongoing  Note:   Patient denies any pain. Will continue to monitor. Problem: Nutrition  Goal: Optimal nutrition therapy  Description       5/31/2019 0540 by Macey Escobedo RN  Outcome: Ongoing  Note:   Continuous tube feeding. Goal of 30ml/hr met. TPN given to patient also.

## 2019-05-31 NOTE — DISCHARGE SUMMARY
[facility] SERVICE       Discharge Summary     Patient ID: Lanie Paget  :  1950   MRN: [id number]     ACCOUNT:  [de-identified]   Patient's PCP: Griffin Hester DO  Admit Date: 2019   Discharge Date: 2019     Length of Stay: 50  Code Status:  Full Code  Admitting Physician: Tobi Parish MD  Discharge Physician: Ness Gaytan MD     Active Discharge Diagnoses:     Hospital Problem Lists:  Principal Problem:    Acute respiratory failure ([address]  Active Problems:    Sepsis due to urinary tract infection ([address]    Cystitis    Emphysematous cystitis    Septic shock ([address]    Leukemoid reaction    Aspiration pneumonia of right lower lobe due to vomit ([address]    MRSA carrier    Urinary retention    Abdominal distention    Elevated CEA    Elevated CA 19-9 level    Cholecystitis    CRP elevated    Elevated procalcitonin    Bandemia    Centrilobular emphysema (HCC)    Hemorrhage of rectum and anus    GI bleed    Anemia    Small bowel obstruction (HCC)    Anxiety disorder    Noncompliance    Chronic obstructive pulmonary disease, unspecified ([address]    Bacteremia due to coagulase-negative Staphylococcus    Bradycardia    Fever  Resolved Problems:    * No resolved hospital problems. *      Admission Condition:  fair     Discharged Condition: fair    Hospital Stay:     Hospital Course:  Lanie Paget is a 77 y.o. female who was admitted for the management of   Acute respiratory failure ([address] , presented to ER with Abdominal Pain    at bedside in the ICU. Afebrile, VSS. Patient appears better today visually. Alert and awake. Overnight apparently the patient was found pulling at lines. Mitts were placed accordingly and was redirected. RUQ pain has significantly decreased - HIDA scan yesterday was not able to visualize the GB. WBC trending down. Afebrile. Tachycardic into the 150's overnight, required Amiodarone drip and an increase in PRN lopressor. This AM HR <102, SBP controlled.  I do not believe she is medically stable enough still at this time for the OR    cholecystitis s/pex-lap, open cholecystectomy, right colectomy w/ ileocolic anastomosis, extensive enterolysis, POD#11  - ID following - Flagyl, Cipro   - Surgery following - soft diet, s/p G-tube placement on 5/25 w/ dehiscence repair, tube feed 10ml (do not advance), Reglan 10mg q6hrs  - Pulm following - Duoneb, Dulera, O2 PRN            Anemia 2/2 ABLA vs. AOCD, multifactorial  - Hgb 6.9 (from 7.3), transfuse 1U pRBC  - H+H q8hrs, transfuse < 7  - Venofer 200mg qd x 5 doses      Paroxysmal A.fib - resolved   - Amio 100mg BID     Hypothyroid  - TSH - 79  - Levothyroxine increased to 75, will continue to titrate up     Severe malnutrition  - Encourage PO intake  - C/w tube feeds per G tube  - Mirtazapine qhs      Hypokalemia  - K 2.9  - replace, 60meq total  - f/u repeat BMP     DVT PPx  - Lovenox     Dispo  - PT/OT  - SW following - pending [facility] ([id number], Dr. Nathaniel Butts) vs. Bonifacio Rust        5/28   transfused 5/27   hb 6.9   today 8.7       Not tolerating tf   clamped    On tpn   tolerating po              Clamp  g tube   decrease tpn    Increase po nutritional support   follow hb       afib    vr       100    bp 146    Add lopressor   on amioderone po       k 3.7   cr nl       5/29       Post op ashley   low grade fever   still has a lot aof abd pain     [poor oral intake   on tpn   g tube clamped    Tolerating some po intake       kub   neg   ua neg       check cxr       afib vr 89      140/80      increase lopressor 50 mg bid   on amioderone                    5/30        Mild overload      pleural effusion         lasix 20 mg    off ivf   on tpn   poor oral intake   tf at 10 ml    ADVANCE    AFIV VR CONTROLLED   BP CONTROLLED   CONT LOPRESSOR/ AMIODERONE         5/31      still on tpn       sob better   feels better   af vr controlled      no chf     No uri   no fever   wbc 14    Resolving sepsis                     Significant therapeutic interventions:     Significant Diagnostic Studies:   Labs / Micro:  CBC:   Lab Results   Component Value Date    WBC 14.4 05/31/2019    RBC 3.12 05/31/2019    RBC 3.66 05/23/2012    HGB 9.0 05/31/2019    HCT 27.4 05/31/2019    MCV 88.0 05/31/2019    MCH 28.7 05/31/2019    MCHC 32.7 05/31/2019    RDW 16.9 05/31/2019     05/31/2019     05/23/2012     BMP:    Lab Results   Component Value Date    GLUCOSE 112 05/31/2019    GLUCOSE 87 05/21/2012     05/31/2019    K 3.4 05/31/2019     05/31/2019    CO2 25 05/31/2019    ANIONGAP 9 05/31/2019    BUN 16 05/31/2019    CREATININE <0.40 05/31/2019    BUNCRER NOT REPORTED 05/31/2019    CALCIUM 7.6 05/31/2019    LABGLOM CANNOT BE CALCULATED 05/31/2019    GFRAA CANNOT BE CALCULATED 05/31/2019    GFR      05/31/2019    GFR NOT REPORTED 05/31/2019             Radiology:    Xr Chest (single View Frontal)    Result Date: 5/23/2019  EXAMINATION: ONE XRAY VIEW OF THE CHEST 5/23/2019 6:35 am COMPARISON: 05/20/2019 HISTORY: ORDERING SYSTEM PROVIDED HISTORY: Follow up pleural effusion TECHNOLOGIST PROVIDED HISTORY: Follow up pleural effusion Ordering Physician Provided Reason for Exam: follow up pleural effusion Acuity: Acute Type of Exam: Initial Follow-up FINDINGS: The cardiac silhouette and mediastinal contours are stable. The endotracheal tube and orogastric tube have been removed. Left-sided cardiac device and right PICC are stable. There are bilateral pleural effusions, left side greater than right, with associated bibasilar atelectasis, not appreciably changed. No new lung infiltrate. The visualized osseous structures are unremarkable. 1. Removal of the endotracheal tube and orogastric tube. 2. Stable small bilateral pleural effusions, left side greater than right, with associated bibasilar atelectasis.      Xr Chest (single View Frontal)    Result Date: 5/11/2019  EXAMINATION: ONE XRAY VIEW OF THE CHEST 5/11/2019 6:28 am COMPARISON: 10 May 2019 HISTORY: ORDERING SYSTEM PROVIDED HISTORY: Respiratory failure TECHNOLOGIST PROVIDED HISTORY: Respiratory failure Ordering Physician Provided Reason for Exam: Respiratory failure Acuity: Unknown Type of Exam: Unknown FINDINGS: AP portable view of the chest time stamped at 603 hours demonstrates findings of generalized COPD. Overlying cardiac monitoring electrodes are present. An intestinal tube extends below the fundus of the stomach, tip not included. Right central line terminates in the distal superior vena cava. Bipolar pacemaker enters from the left with intact leads in appropriate positions. Heart size is stable, top-normal.  Lung fields are hyperinflated suggestive of COPD. Interstitial markings are coarsened, similar to that noted in 2015 likely chronic interstitial change. There is improved aeration at the left lung base with slight blunting of the left costophrenic angle suggesting effusion. There is been interval clearing of right basilar opacities likely resolved atelectasis. Osseous and mediastinal structures are age-appropriate. No vascular congestion or extrapleural air is noted. Improved aeration of both lung bases with resolved right basilar opacity. Mild left basilar opacity persists with blunting of the left costophrenic angle with small effusion on the left suspected. Findings of COPD and suspected chronic fibrotic change are noted. No extrapleural air. Tubes and lines as above. Xr Chest (single View Frontal)    Result Date: 5/2/2019  EXAMINATION: SINGLE XRAY VIEW OF THE CHEST 5/2/2019 6:34 am COMPARISON: 29 April 2019 HISTORY: ORDERING SYSTEM PROVIDED HISTORY: COPD TECHNOLOGIST PROVIDED HISTORY: COPD Ordering Physician Provided Reason for Exam: COPD Acuity: Acute Type of Exam: Initial FINDINGS: AP portable view of the chest time stamped at 630 hours demonstrates stable cardiac size. Overlying cardiac monitoring electrodes are present.  Right-sided PICC line terminates in the right atrium. Bipolar pacemaker enters from the left with intact leads in appropriate positions. There is been no significant interval change in bilateral interstitial opacities, representing interval change since 2015. This may be related to worsening interstitial disease or superimposed venous congestion. Bilateral effusions are noted with bibasilar opacities, either atelectasis or edema. Continued bilateral effusions, bibasilar opacities and bilateral interstitial opacities in the upper lobes. Findings may be related to worsening interstitial disease and edema. Airspace disease is not excluded. Xr Knee Left (1-2 Views)    Result Date: 5/8/2019  EXAMINATION: 2 XRAY VIEWS OF THE LEFT KNEE 5/7/2019 11:19 am COMPARISON: Left knee radiographs 03/30/2019. HISTORY: ORDERING SYSTEM PROVIDED HISTORY: fracture TECHNOLOGIST PROVIDED HISTORY: fracture Ordering Physician Provided Reason for Exam: left knee/distal femur pain Acuity: Acute Type of Exam: Initial FINDINGS: Bones are osteopenic. No suprapatellar joint effusion. There is a impacted, transverse fracture of the distal femoral metadiaphysis. Increased sclerosis along the fracture line suggests interval healing. Fracture fragments are in unchanged, near anatomic alignment. No acute dislocation. No new fracture is seen. Impacted, transverse fracture of the distal femoral metadiaphysis is in unchanged alignment and demonstrates interval healing. Xr Abdomen (kub) (single Ap View)    Result Date: 5/28/2019  EXAMINATION: ONE SUPINE XRAY VIEW(S) OF THE ABDOMEN 5/28/2019 11:38 am COMPARISON: Abdominal radiograph performed 05/21/2019. HISTORY: ORDERING SYSTEM PROVIDED HISTORY: ileus TECHNOLOGIST PROVIDED HISTORY: ileus FINDINGS: There is a nonspecific bowel gas pattern without evidence for obstruction. There is a gastric tube. An abdominal drain is appreciated. There is no intraperitoneal free air. There are no suspicious calcifications. 14, 2019 HISTORY: ORDERING SYSTEM PROVIDED HISTORY: Check for abscess/fluid collection around ashley tube site. TECHNOLOGIST PROVIDED HISTORY: Ordering Physician Provided Reason for Exam: Patient c/o abd pain around her ashley site and drainage tube. FINDINGS: Evaluation is limited by motion. Lower Chest: Moderate bilateral pleural effusions. Organs: Multiple liver hypodensities measuring up to 4 mm are incompletely characterized but in the absence of risk factors likely represent cysts requiring no specific follow-up. Cholecystostomy tube is in place. No adjacent focal drainable fluid collection. No pancreatic lesion. No splenomegaly. No adrenal lesion. No hydronephrosis. No renal lesion. GI/Bowel: Stomach is markedly distended. Swirled appearance of the mesentery is seen within the mid to lower abdomen with adjacent thickened loops of small bowel. Peritoneum/Retroperitoneum: Atherosclerotic calcification of the abdominal aorta without aneurysmal dilatation. No adenopathy. Bones/Soft Tissues: No acute soft tissue abnormality. Diffuse osteopenia. Lumbar spine degenerative changes. Bowel obstruction which is suspected to be caused by an internal hernia. Cholecystostomy tube is in place. No surrounding fluid collection. Nm Gi Blood Loss    Result Date: 5/3/2019  EXAMINATION: NUCLEAR MEDICINE GASTRIC BLEEDING STUDY 5/3/2019 TECHNIQUE: Following the intravenous injection of 23.8 mCi of 99 mTc-labeled RBC's, a flow study and standard images of the abdomen was obtained over a total period of 60 minutes COMPARISON: None. HISTORY: ORDERING SYSTEM PROVIDED HISTORY: GI BLEEDING TECHNOLOGIST PROVIDED HISTORY: Ordering Physician Provided Reason for Exam: GI bleeding Acuity: Unknown Type of Exam: Unknown FINDINGS: Activity is seen within the blood pool, including the great vessels, heart, liver, and spleen.   There was no abnormal accumulation of radioactivity in the abdomen to suggest active bleeding during study There is a gastric tube in stable position. Cardiomegaly and bilateral pulmonary congestion and bibasilar effusions. Similar-appearing support tubes. Xr Chest Portable    Result Date: 5/17/2019  EXAMINATION: ONE XRAY VIEW OF THE CHEST 5/17/2019 6:59 am COMPARISON: May 16, 2019 HISTORY: ORDERING SYSTEM PROVIDED HISTORY: Vent TECHNOLOGIST PROVIDED HISTORY: Vent Ordering Physician Provided Reason for Exam: on vent Acuity: Acute Type of Exam: Initial FINDINGS: ET tube is in slightly improved position with the tip projecting 5 cm from the ron. .  NG tube beneath the diaphragm. Cardiac monitoring leads overlie the chest.  Right upper extremity PICC in good position. Implanted cardiac device is present. Calcified plaquing of the aorta. Borderline cardiomegaly. Central vascular congestion. Left-sided effusion and associated airspace disease, stable from prior. No pneumothorax. Stable chronic pulmonary changes. Stable left basilar pleural-parenchymal process. Slightly improved ET tube position. Chronic pulmonary changes. Xr Chest Portable    Addendum Date: 5/16/2019    ADDENDUM: The ET tube is high and should be advanced by approximately 4 cm. The findings were sent to the Radiology Results [address] at 8:28 am on 5/16/2019to be communicated to a licensed caregiver. Result Date: 5/16/2019  EXAMINATION: ONE XRAY VIEW OF THE CHEST 5/16/2019 6:33 am COMPARISON: 15 May 2019 HISTORY: ORDERING SYSTEM PROVIDED HISTORY: Vent TECHNOLOGIST PROVIDED HISTORY: Vent Ordering Physician Provided Reason for Exam: Vent pt check ETT Acuity: Acute Type of Exam: Subsequent/Follow-up Additional signs and symptoms: Vent pt check ETT FINDINGS: AP portable view of the chest time stamped at 1614 hours demonstrates overlying cardiac monitoring electrodes. An endotracheal tube is noted in situ, terminating approximately 8 cm above the ron.   Intestinal tube extends below the diaphragm, tip not included on the aspiration pneumonia TECHNOLOGIST PROVIDED HISTORY: Follow up aspiration pneumonia Ordering Physician Provided Reason for Exam: follow up aspiration pneumonia Acuity: Acute Type of Exam: Initial FINDINGS: Implanted cardiac device is present. Stable cardiomediastinal contours. Extensively calcified aorta. Right upper extremity PICC in good position. Left basilar atelectasis with airspace disease and effusion, increased from prior. No pneumothorax. COPD with prominent interstitial markings. Removal of NG tube since prior exam.     New left effusion and associated airspace disease. Differential includes atelectasis versus pneumonia. Xr Chest Portable    Result Date: 5/10/2019  EXAMINATION: ONE XRAY VIEW OF THE CHEST 5/10/2019 1:28 am COMPARISON: May 2, 2019. HISTORY: ORDERING SYSTEM PROVIDED HISTORY: low o2 sat TECHNOLOGIST PROVIDED HISTORY: low o2 sat Ordering Physician Provided Reason for Exam: Low o2 sat Acuity: Acute Type of Exam: Initial FINDINGS: Hyperexpanded right lung volume. Left greater than right basilar heterogeneous opacities with left basilar consolidation. Small bilateral pleural effusions. Stable cardiomediastinal silhouette and great vessels. Enteric contrast in the stomach and distal esophagus. Enteric tube courses into the stomach but tip is not definitely seen due to contrast in the stomach. Stable left pectoral cardiac pacer device. Stable right PICC. Left greater than right basilar heterogeneous opacities with left basilar consolidation. Differential considerations include atelectasis and an infectious/inflammatory process. Small bilateral pleural effusions. Enteric contrast in the stomach and distal esophagus. Enteric tube courses into the stomach but tip is not definitely seen due to contrast in the stomach.      Vl Dup Lower Extremity Venous Bilateral    Result Date: 5/7/2019    [facility]  Vascular Lower Extremities DVT Study Procedure   [facility] [address] [facility]     Date of Study           05/07/2019 March Emanuel   Date of Birth  1950  Gender                  Female   Age            77 year(s)  Race                       Room Number    2123        Height:                 59.84 inch, 152 cm   Corporate ID # [id number]    Weight:                 102 pounds, 46.3 kg   Patient Acct # [de-identified]   BSA:        1.4 m^2     BMI:      20.03 kg/m^2   MR #           [id number]      Sonographer             Elizabet Hernandes RVT   Accession #    [id number]   Interpreting Physician  Elenita Mckeon   Referring                  Referring Physician     Steph Alvarez MD  Nurse  Practitioner  Procedure Type of Study:   Veins: Lower Extremities DVT Study, Venous Scan Lower Bilateral.  Indications for Study:Pain and swelling. Patient Status: In Patient. Technical Quality:Limited visualization. Limitation reason:patient movement. Conclusions   Summary   No evidence of superficial or deep venous thrombosis in both lower  extremities. Technically limited visualization. Signature   ----------------------------------------------------------------  Electronically signed by Elizabet Hernandes RVT(Sonographer) on  05/07/2019 01:22 PM  ----------------------------------------------------------------   ----------------------------------------------------------------  Electronically signed by Octavia SilvaInterpreting  physician) on 05/07/2019 06:45 PM  ----------------------------------------------------------------  Findings:   Right Impression:                    Left Impression:  The common femoral, femoral,         The common femoral, femoral,  popliteal and tibial veins           popliteal and tibial veins  demonstrate normal compressibility   demonstrate normal compressibility  and augmentation. and augmentation. Limited visualization of the calf    Limited visualization of the calf  veins. veins.    Normal compressibility of the great  Normal compressibility of the great  saphenous vein. saphenous vein. Normal compressibility of the small  Normal compressibility of the small  saphenous vein. saphenous vein. Velocities are measured in cm/s ; Diameters are measured in cm Right Lower Extremities DVT Study Measurements Right 2D Measurements +------------------------------------+----------+---------------+----------+ ! Location                            ! Visualized! Compressibility! Thrombosis! +------------------------------------+----------+---------------+----------+ ! Common Femoral                      !Yes       ! Yes            ! None      ! +------------------------------------+----------+---------------+----------+ ! Prox Femoral                        !Yes       ! Yes            ! None      ! +------------------------------------+----------+---------------+----------+ ! Mid Femoral                         !Yes       ! Yes            ! None      ! +------------------------------------+----------+---------------+----------+ ! Dist Femoral                        !Yes       ! Yes            ! None      ! +------------------------------------+----------+---------------+----------+ ! Deep Femoral                        !Yes       ! Yes            ! None      ! +------------------------------------+----------+---------------+----------+ ! Popliteal                           !Yes       ! Yes            ! None      ! +------------------------------------+----------+---------------+----------+ ! Sapheno Femoral Junction            ! Yes       ! Yes            ! None      ! +------------------------------------+----------+---------------+----------+ ! PTV                                 ! Partial   !Yes            ! None      ! +------------------------------------+----------+---------------+----------+ ! Peroneal                            !Partial   !Yes            ! None      ! +------------------------------------+----------+---------------+----------+ ! Gastroc                             ! Yes       ! Yes            ! None      ! +------------------------------------+----------+---------------+----------+ ! GSV Thigh                           ! Yes       ! Yes            ! None      ! +------------------------------------+----------+---------------+----------+ ! GSV Knee                            ! Yes       ! Yes            ! None      ! +------------------------------------+----------+---------------+----------+ ! GSV Ankle                           ! Yes       ! Yes            ! None      ! +------------------------------------+----------+---------------+----------+ ! SSV                                 ! Partial   !Yes            ! None      ! +------------------------------------+----------+---------------+----------+ Left Lower Extremities DVT Study Measurements Left 2D Measurements +------------------------------------+----------+---------------+----------+ ! Location                            ! Visualized! Compressibility! Thrombosis! +------------------------------------+----------+---------------+----------+ ! Common Femoral                      !Yes       ! Yes            ! None      ! +------------------------------------+----------+---------------+----------+ ! Prox Femoral                        !Yes       ! Yes            ! None      ! +------------------------------------+----------+---------------+----------+ ! Mid Femoral                         !Yes       ! Yes            ! None      ! +------------------------------------+----------+---------------+----------+ ! Dist Femoral                        !Yes       ! Yes            ! None      ! +------------------------------------+----------+---------------+----------+ ! Deep Femoral                        !Yes       ! Yes            ! None      ! +------------------------------------+----------+---------------+----------+ ! Popliteal !Yes       !Yes            ! None      ! +------------------------------------+----------+---------------+----------+ ! Sapheno Femoral Junction            ! Yes       ! Yes            ! None      ! +------------------------------------+----------+---------------+----------+ ! PTV                                 ! Partial   !Yes            ! None      ! +------------------------------------+----------+---------------+----------+ ! Peroneal                            !Partial   !Yes            ! None      ! +------------------------------------+----------+---------------+----------+ ! Gastroc                             ! Yes       ! Yes            ! None      ! +------------------------------------+----------+---------------+----------+ ! GSV Thigh                           ! Yes       ! Yes            ! None      ! +------------------------------------+----------+---------------+----------+ ! GSV Knee                            ! Yes       ! Yes            ! None      ! +------------------------------------+----------+---------------+----------+ ! GSV Ankle                           ! Yes       ! Yes            ! None      ! +------------------------------------+----------+---------------+----------+ ! SSV                                 ! Partial   !Yes            ! None      ! +------------------------------------+----------+---------------+----------+    Fl Small Bowel Follow Through Only    Result Date: 5/10/2019  EXAMINATION: SMALL BOWEL FOLLOW THROUGH SERIES 5/9/2019 TECHNIQUE: Small bowel follow through series was performed with overhead images. FLUOROSCOPY DOSE AND TYPE OR TIME AND EXPOSURES: No fluoroscopic images obtained. COMPARISON: CT abdomen pelvis 05/05/2019 HISTORY: ORDERING SYSTEM PROVIDED HISTORY: internal hernia TECHNOLOGIST PROVIDED HISTORY: Water soluble contrast only.  Study to be done ONLY if NGT is placed by IR internal hernia FINDINGS:  image demonstrates NG tube overlying the left side of the abdomen within the stomach. Surgical drain overlies the right side of the abdomen. There is a nonspecific bowel gas pattern with mild dilated loops of bowel in lower abdomen. Initial images demonstrate filling of the stomach. At 1 hour and 45 minutes no significant contrast is noted in the small bowel. The 4-1/2 hour image demonstrates contrast within loops of bowel within the mid and lower abdomen and pelvis. There appears to be contrast extending to the colon in the right side of the abdomen. Contrast is noted within the pelvis which may be within the bladder or rectum. Significant delay in emptying of the stomach with persistent contrast noted at the 6 hour concha. There is contrast extending into the bowel which appears to be in the colon. Subtle findings are limited. Consider follow-up radiograph of the abdomen in 6-8 hours. [facility] Ng Tube By Dr Monique Ravi    Result Date: 5/9/2019  PROCEDURE: XR PLACE NASOGASTRIC TUBE PHYS 5/9/2019 HISTORY: ORDERING SYSTEM PROVIDED HISTORY: ileus TECHNOLOGIST PROVIDED HISTORY: ileus Ordering Physician Provided Reason for Exam: ILEUS Acuity: Unknown Type of Exam: Unknown CONTRAST: None SEDATION: None FLUOROSCOPY DOSE AND TYPE OR TIME AND EXPOSURES: 21 seconds; D AP 46 DESCRIPTION OF PROCEDURE AND FINDINGS: This procedure was performed by Dr. Anika Hurley. Under intermittent fluoroscopic guidance a 12 Slovak nasogastric tube was placed through the right nostril and directed under fluoroscopy into the stomach. A small amount of air was injected confirming the tip location in the stomach. Partially visualized cholecystostomy tube and pacer wires. Tube was secured in place to the patient's nose with an adhesive dressing. 12 Slovak nasogastric tube placed successfully with its tip in the stomach.          Consultations:    Consults:     Final Specialist Recommendations/Findings:   IP CONSULT TO UROLOGY  IP CONSULT TO PRIMARY CARE PROVIDER  IP CONSULT TO SOCIAL WORK  IP CONSULT TO GENERAL SURGERY  IP CONSULT TO CRITICAL CARE  IP CONSULT TO INFECTIOUS DISEASES  IP CONSULT TO GI  IP CONSULT TO DIETITIAN  IP CONSULT TO DIETITIAN  IP CONSULT TO ORTHOPEDIC SURGERY  IP CONSULT TO PHARMACY  IP CONSULT TO GI  IP CONSULT TO PALLIATIVE CARE  IP CONSULT TO ORTHOPEDIC SURGERY  IP CONSULT TO PSYCHIATRY  IP CONSULT TO ETHICS  IP CONSULT TO HOSPICE  IP CONSULT TO DIETITIAN  IP CONSULT TO DIETITIAN  IP CONSULT TO PHARMACY  IP CONSULT TO CARDIOLOGY      The patient was seen and examined on day of discharge and this discharge summary is in conjunction with any daily progress note from day of discharge. Discharge plan:     Disposition: Skilled Facility    Physician Follow Up:     Willis Cockayne, DO [address]   [facility] [address] [facility] [address]          [address]  [place] [address] [facility]  [phone]    Schedule an appointment as soon as possible for a visit in 10 days  For post operative check       Requiring Further Evaluation/Follow Up POST HOSPITALIZATION/Incidental Findings:     Diet: cardiac diet    Activity: As tolerated    Instructions to Patient:     Discharge Medications:      Medication List      START taking these medications    amiodarone 100 MG tablet  Commonly known as:  PACERONE  Take 1 tablet by mouth 2 times daily     ipratropium-albuterol 0.5-2.5 (3) MG/3ML Soln nebulizer solution  Commonly known as:  DUONEB  Inhale 3 mLs into the lungs 4 times daily     levothyroxine 75 MCG tablet  Commonly known as:  SYNTHROID  Take 1 tablet by mouth Daily  Start taking on:  6/1/2019     metoprolol tartrate 50 MG tablet  Commonly known as:  LOPRESSOR  Take 1 tablet by mouth 2 times daily     mirtazapine 15 MG disintegrating tablet  Commonly known as:  REMERON SOL-TAB  Take 0.5 tablets by mouth nightly        CONTINUE taking these medications    omeprazole 20 MG delayed release capsule  Commonly known as:  PRILOSEC     oxyCODONE-acetaminophen 5-325 MG per tablet  Commonly

## 2019-05-31 NOTE — PROGRESS NOTES
RODGER informed of discharge. After much discussion about TPN and tube feeds, both Stanislav and Kun Damon were following. Both were both able to get pt approved. MD did discontinue TPN. Alfonso Joselito will accept pt. Transport set for [phone] via "[employer]. This worker completed  in [address]. Orders sent to facility. Addendum: Gerry Bentley is coming at 1800. Stanislav informed.  Report Number is [id number]

## 2019-05-31 NOTE — PROGRESS NOTES
Pulmonary Progress Note  Pulmonary and Critical Care Specialists      Patient - Keshia Joseph,  Age - 77 y.o.    - 1950      Room Number - 2234/8063-46   N -  [id number]   [facility] # - [de-identified]  Date of Admission -  2019  2:48 PM    Follow-up: Pleural effusion    Consulting Tiana Irizarry MD  Primary Care Physician - Malinda Baca,      SUBJECTIVE   On room air  Denies any fever or chills  No chest pain, short of breath or  cough      OBJECTIVE   VITALS    height is 5' (1.524 m) and weight is 118 lb 2.7 oz (53.6 kg). Her oral temperature is 98.2 °F (36.8 °C). Her blood pressure is 113/63 and her pulse is 83. Her respiration is 18 and oxygen saturation is 96%. Body mass index is 23.08 kg/m². Temperature Range: Temp: 98.2 °F (36.8 °C) Temp  Av.3 °F (36.8 °C)  Min: 97.7 °F (36.5 °C)  Max: 99.1 °F (37.3 °C)  BP Range:  Systolic (13EHW), UKH:141 , Min:106 , UFM:256     Diastolic (08FGJ), EHB:31, Min:51, Max:66    Pulse Range: Pulse  Av.3  Min: 76  Max: 86  Respiration Range: Resp  Av.3  Min: 15  Max: 18  Current Pulse Ox[de-identified]  SpO2: 96 %  24HR Pulse Ox Range:  SpO2  Av.5 %  Min: 94 %  Max: 96 %  Oxygen Amount and Delivery: O2 Flow Rate (L/min): 2 L/min    Wt Readings from Last 3 Encounters:   19 118 lb 2.7 oz (53.6 kg)   19 89 lb (40.4 kg)   19 94 lb 1.6 oz (42.7 kg)       I/O (24 Hours)    Intake/Output Summary (Last 24 hours) at 2019 1110  Last data filed at 2019 0710  Gross per 24 hour   Intake 2357 ml   Output 2340 ml   Net 17 ml       EXAM     General Appearance  Awake, alert,  in no acute distress  HEENT - normocephalic, atraumatic.    Neck - no JVD,  trachea midline   Lungs - decreased sounds, coarse, no wheezing or distress  Cardiovascular - Heart sounds are normal.  regular rate and rhythm   Abdomen - Soft, non tender, nondistended,   Neurologic - looks comfortable, following commands  Skin - No bruising or bleeding  Extremities - No clubbing, cyanosis, edema    MEDS      pantoprazole  40 mg Oral QAM AC    metoprolol tartrate  50 mg Oral BID    levothyroxine  75 mcg Oral Daily    amiodarone  100 mg Oral BID    mirtazapine  7.5 mg Oral Nightly    ipratropium-albuterol  1 ampule Inhalation 4x daily    fat emulsion  100 mL Intravenous Daily    insulin lispro  0-6 Units Subcutaneous Q6H    sodium chloride flush  10 mL Intravenous 2 times per day    alteplase  1 mg Intracatheter Once    mometasone-formoterol  2 puff Inhalation BID      PN-Adult 2-in-1 Central Line (Standard) 65 mL/hr at 05/30/19 1852    dextrose 5 % and 0.45 % NaCl 25 mL/hr at 05/31/19 0955    dextrose       potassium chloride **OR** potassium alternative oral replacement **OR** potassium chloride, acetaminophen, fentanNYL **OR** fentanNYL, LORazepam, glucose, dextrose, glucagon (rDNA), dextrose, sodium phosphate IVPB **OR** sodium phosphate IVPB, sodium chloride flush, ondansetron, potassium chloride, magnesium sulfate    LABS   CBC   Recent Labs     05/31/19  0644   WBC 14.4*   HGB 9.0*   HCT 27.4*   MCV 88.0        BMP:   Lab Results   Component Value Date     05/31/2019    K 3.4 05/31/2019     05/31/2019    CO2 25 05/31/2019    BUN 16 05/31/2019    LABALBU 2.4 05/25/2019    LABALBU 4.6 05/17/2012    CREATININE <0.40 05/31/2019    CALCIUM 7.6 05/31/2019    GFRAA CANNOT BE CALCULATED 05/31/2019    LABGLOM CANNOT BE CALCULATED 05/31/2019     ABGs:  Lab Results   Component Value Date    PHART 7.487 05/20/2019    PO2ART 62.2 05/20/2019    ELF3XIG 48.9 05/20/2019      Lab Results   Component Value Date    MODE PRVC 05/20/2019     Ionized Calcium:  No results found for: IONCA  Magnesium:    Lab Results   Component Value Date    MG 1.8 05/31/2019      Phosphorus:    Lab Results   Component Value Date    PHOS 2.8 05/31/2019        LIVER PROFILE No results for input(s): AST, ALT, LIPASE, BILIDIR,

## 2019-06-04 ENCOUNTER — HOSPITAL ENCOUNTER (OUTPATIENT)
Age: 69
Setting detail: SPECIMEN
Discharge: HOME | End: 2019-06-04
Payer: COMMERCIAL

## 2019-06-04 LAB
ANION GAP SERPL CALCULATED.3IONS-SCNC: 12 MMOL/L (ref 9–17)
BUN BLDV-MCNC: 13 MG/DL (ref 8–23)
BUN/CREAT BLD: ABNORMAL (ref 9–20)
CALCIUM SERPL-MCNC: 8 MG/DL (ref 8.6–10.4)
CHLORIDE BLD-SCNC: 102 MMOL/L (ref 98–107)
CO2: 21 MMOL/L (ref 20–31)
CREAT SERPL-MCNC: 0.26 MG/DL (ref 0.5–0.9)
GFR AFRICAN AMERICAN: >60 ML/MIN
GFR NON-AFRICAN AMERICAN: >60 ML/MIN
GFR SERPL CREATININE-BSD FRML MDRD: ABNORMAL ML/MIN/{1.73_M2}
GFR SERPL CREATININE-BSD FRML MDRD: ABNORMAL ML/MIN/{1.73_M2}
GLUCOSE BLD-MCNC: 99 MG/DL (ref 70–99)
HCT VFR BLD CALC: 29.3 % (ref 36.3–47.1)
HEMOGLOBIN: 9.1 G/DL (ref 11.9–15.1)
MCH RBC QN AUTO: 29.4 PG (ref 25.2–33.5)
MCHC RBC AUTO-ENTMCNC: 31.1 G/DL (ref 28.4–34.8)
MCV RBC AUTO: 94.8 FL (ref 82.6–102.9)
NRBC AUTOMATED: 0 PER 100 WBC
PDW BLD-RTO: 17.9 % (ref 11.8–14.4)
PLATELET # BLD: 313 K/UL (ref 138–453)
PMV BLD AUTO: 10.9 FL (ref 8.1–13.5)
POTASSIUM SERPL-SCNC: 3.7 MMOL/L (ref 3.7–5.3)
RBC # BLD: 3.09 M/UL (ref 3.95–5.11)
SODIUM BLD-SCNC: 135 MMOL/L (ref 135–144)
WBC # BLD: 10.8 K/UL (ref 3.5–11.3)

## 2019-06-04 PROCEDURE — 36415 COLL VENOUS BLD VENIPUNCTURE: CPT

## 2019-06-04 PROCEDURE — P9603 ONE-WAY ALLOW PRORATED MILES: HCPCS

## 2019-06-04 PROCEDURE — 85027 COMPLETE CBC AUTOMATED: CPT

## 2019-06-04 PROCEDURE — 80048 BASIC METABOLIC PNL TOTAL CA: CPT

## 2019-06-11 ENCOUNTER — HOSPITAL ENCOUNTER (OUTPATIENT)
Age: 69
Setting detail: SPECIMEN
Discharge: HOME | End: 2019-06-11
Payer: COMMERCIAL

## 2019-06-11 LAB
ANION GAP SERPL CALCULATED.3IONS-SCNC: 13 MMOL/L (ref 9–17)
BUN BLDV-MCNC: 23 MG/DL (ref 8–23)
BUN/CREAT BLD: ABNORMAL (ref 9–20)
CALCIUM SERPL-MCNC: 8.8 MG/DL (ref 8.6–10.4)
CHLORIDE BLD-SCNC: 96 MMOL/L (ref 98–107)
CO2: 29 MMOL/L (ref 20–31)
CREAT SERPL-MCNC: 0.48 MG/DL (ref 0.5–0.9)
GFR AFRICAN AMERICAN: >60 ML/MIN
GFR NON-AFRICAN AMERICAN: >60 ML/MIN
GFR SERPL CREATININE-BSD FRML MDRD: ABNORMAL ML/MIN/{1.73_M2}
GFR SERPL CREATININE-BSD FRML MDRD: ABNORMAL ML/MIN/{1.73_M2}
GLUCOSE BLD-MCNC: 104 MG/DL (ref 70–99)
HCT VFR BLD CALC: 34.4 % (ref 36.3–47.1)
HEMOGLOBIN: 10.5 G/DL (ref 11.9–15.1)
MCH RBC QN AUTO: 28.8 PG (ref 25.2–33.5)
MCHC RBC AUTO-ENTMCNC: 30.5 G/DL (ref 28.4–34.8)
MCV RBC AUTO: 94.5 FL (ref 82.6–102.9)
NRBC AUTOMATED: 0 PER 100 WBC
PDW BLD-RTO: 16.7 % (ref 11.8–14.4)
PLATELET # BLD: 363 K/UL (ref 138–453)
PMV BLD AUTO: 10 FL (ref 8.1–13.5)
POTASSIUM SERPL-SCNC: 3.7 MMOL/L (ref 3.7–5.3)
RBC # BLD: 3.64 M/UL (ref 3.95–5.11)
SODIUM BLD-SCNC: 138 MMOL/L (ref 135–144)
WBC # BLD: 10.4 K/UL (ref 3.5–11.3)

## 2019-06-11 PROCEDURE — P9603 ONE-WAY ALLOW PRORATED MILES: HCPCS

## 2019-06-11 PROCEDURE — 80048 BASIC METABOLIC PNL TOTAL CA: CPT

## 2019-06-11 PROCEDURE — 36415 COLL VENOUS BLD VENIPUNCTURE: CPT

## 2019-06-11 PROCEDURE — 85027 COMPLETE CBC AUTOMATED: CPT

## 2019-06-19 ENCOUNTER — TELEPHONE (OUTPATIENT)
Dept: ORTHOPEDIC SURGERY | Age: 69
End: 2019-06-19

## 2019-06-19 NOTE — TELEPHONE ENCOUNTER
Per Dr. Rosy De La Paz instruction America Conde was called to inquire if cast has been removed and if patient is ambulating. Per Aleksandra Pratt, PT cast is off but patient is barely sitting at bed side. Not ambulating at all or even able to do transfers.  States patient has no thrive or motivation

## 2019-06-21 ENCOUNTER — HOSPITAL ENCOUNTER (OUTPATIENT)
Age: 69
Setting detail: SPECIMEN
Discharge: HOME | End: 2019-06-21
Payer: COMMERCIAL

## 2019-06-21 LAB
C DIFFICILE TOXINS, PCR: ABNORMAL
SPECIMEN DESCRIPTION: ABNORMAL

## 2019-06-21 PROCEDURE — 87493 C DIFF AMPLIFIED PROBE: CPT

## 2019-06-22 LAB
C DIFFICILE TOXINS, PCR: ABNORMAL
SPECIMEN DESCRIPTION: ABNORMAL

## 2019-07-11 ENCOUNTER — HOSPITAL ENCOUNTER (OUTPATIENT)
Age: 69
Setting detail: SPECIMEN
Discharge: HOME | End: 2019-07-11
Payer: COMMERCIAL

## 2019-07-11 LAB
ANION GAP SERPL CALCULATED.3IONS-SCNC: 14 MMOL/L (ref 9–17)
BUN BLDV-MCNC: 14 MG/DL (ref 8–23)
BUN/CREAT BLD: ABNORMAL (ref 9–20)
CALCIUM SERPL-MCNC: 9.1 MG/DL (ref 8.6–10.4)
CHLORIDE BLD-SCNC: 97 MMOL/L (ref 98–107)
CO2: 26 MMOL/L (ref 20–31)
CREAT SERPL-MCNC: 0.45 MG/DL (ref 0.5–0.9)
GFR AFRICAN AMERICAN: >60 ML/MIN
GFR NON-AFRICAN AMERICAN: >60 ML/MIN
GFR SERPL CREATININE-BSD FRML MDRD: ABNORMAL ML/MIN/{1.73_M2}
GFR SERPL CREATININE-BSD FRML MDRD: ABNORMAL ML/MIN/{1.73_M2}
GLUCOSE BLD-MCNC: 82 MG/DL (ref 70–99)
HCT VFR BLD CALC: 32.8 % (ref 36.3–47.1)
HEMOGLOBIN: 10.1 G/DL (ref 11.9–15.1)
MCH RBC QN AUTO: 27.7 PG (ref 25.2–33.5)
MCHC RBC AUTO-ENTMCNC: 30.8 G/DL (ref 28.4–34.8)
MCV RBC AUTO: 90.1 FL (ref 82.6–102.9)
NRBC AUTOMATED: 0 PER 100 WBC
PDW BLD-RTO: 17 % (ref 11.8–14.4)
PLATELET # BLD: 262 K/UL (ref 138–453)
PMV BLD AUTO: 11.4 FL (ref 8.1–13.5)
POTASSIUM SERPL-SCNC: 3.8 MMOL/L (ref 3.7–5.3)
RBC # BLD: 3.64 M/UL (ref 3.95–5.11)
SODIUM BLD-SCNC: 137 MMOL/L (ref 135–144)
WBC # BLD: 10.2 K/UL (ref 3.5–11.3)

## 2019-07-11 PROCEDURE — P9603 ONE-WAY ALLOW PRORATED MILES: HCPCS

## 2019-07-11 PROCEDURE — 85027 COMPLETE CBC AUTOMATED: CPT

## 2019-07-11 PROCEDURE — 36415 COLL VENOUS BLD VENIPUNCTURE: CPT

## 2019-07-11 PROCEDURE — 80048 BASIC METABOLIC PNL TOTAL CA: CPT

## 2019-07-24 ENCOUNTER — HOSPITAL ENCOUNTER (OUTPATIENT)
Age: 69
Setting detail: SPECIMEN
Discharge: HOME | End: 2019-07-24
Payer: COMMERCIAL

## 2019-07-24 PROCEDURE — 87070 CULTURE OTHR SPECIMN AEROBIC: CPT

## 2019-07-24 PROCEDURE — 87205 SMEAR GRAM STAIN: CPT

## 2019-07-25 ENCOUNTER — HOSPITAL ENCOUNTER (INPATIENT)
Age: 69
LOS: 4 days | Discharge: NURSING, REHAB OR LONG-TERM CARE | DRG: 862 | End: 2019-07-29
Attending: EMERGENCY MEDICINE | Admitting: INTERNAL MEDICINE
Payer: COMMERCIAL

## 2019-07-25 ENCOUNTER — APPOINTMENT (OUTPATIENT)
Dept: CT IMAGING | Age: 69
DRG: 862 | End: 2019-07-25
Payer: COMMERCIAL

## 2019-07-25 DIAGNOSIS — E87.6 HYPOKALEMIA: ICD-10-CM

## 2019-07-25 DIAGNOSIS — T81.49XA ABDOMINAL WALL ABSCESS AT SITE OF SURGICAL WOUND: Primary | ICD-10-CM

## 2019-07-25 LAB
ABSOLUTE EOS #: 1 K/UL (ref 0–0.4)
ABSOLUTE IMMATURE GRANULOCYTE: ABNORMAL K/UL (ref 0–0.3)
ABSOLUTE LYMPH #: 1.3 K/UL (ref 1–4.8)
ABSOLUTE MONO #: 0.8 K/UL (ref 0.1–1.3)
ALBUMIN SERPL-MCNC: 2.8 G/DL (ref 3.5–5.2)
ALBUMIN/GLOBULIN RATIO: ABNORMAL (ref 1–2.5)
ALP BLD-CCNC: 64 U/L (ref 35–104)
ALT SERPL-CCNC: 7 U/L (ref 5–33)
ANION GAP SERPL CALCULATED.3IONS-SCNC: 11 MMOL/L (ref 9–17)
AST SERPL-CCNC: 12 U/L
BASOPHILS # BLD: 1 % (ref 0–2)
BASOPHILS ABSOLUTE: 0 K/UL (ref 0–0.2)
BILIRUB SERPL-MCNC: <0.15 MG/DL (ref 0.3–1.2)
BUN BLDV-MCNC: 12 MG/DL (ref 8–23)
BUN/CREAT BLD: ABNORMAL (ref 9–20)
CALCIUM SERPL-MCNC: 8.8 MG/DL (ref 8.6–10.4)
CHLORIDE BLD-SCNC: 95 MMOL/L (ref 98–107)
CO2: 27 MMOL/L (ref 20–31)
CREAT SERPL-MCNC: 0.42 MG/DL (ref 0.5–0.9)
DIFFERENTIAL TYPE: ABNORMAL
EOSINOPHILS RELATIVE PERCENT: 12 % (ref 0–4)
GFR AFRICAN AMERICAN: >60 ML/MIN
GFR NON-AFRICAN AMERICAN: >60 ML/MIN
GFR SERPL CREATININE-BSD FRML MDRD: ABNORMAL ML/MIN/{1.73_M2}
GFR SERPL CREATININE-BSD FRML MDRD: ABNORMAL ML/MIN/{1.73_M2}
GLUCOSE BLD-MCNC: 126 MG/DL (ref 70–99)
HCT VFR BLD CALC: 34.5 % (ref 36–46)
HEMOGLOBIN: 11.4 G/DL (ref 12–16)
IMMATURE GRANULOCYTES: ABNORMAL %
LACTIC ACID: 1.3 MMOL/L (ref 0.5–2.2)
LYMPHOCYTES # BLD: 16 % (ref 24–44)
MCH RBC QN AUTO: 27.8 PG (ref 26–34)
MCHC RBC AUTO-ENTMCNC: 32.9 G/DL (ref 31–37)
MCV RBC AUTO: 84.6 FL (ref 80–100)
MONOCYTES # BLD: 10 % (ref 1–7)
NRBC AUTOMATED: ABNORMAL PER 100 WBC
PDW BLD-RTO: 17.2 % (ref 11.5–14.9)
PLATELET # BLD: 320 K/UL (ref 150–450)
PLATELET ESTIMATE: ABNORMAL
PMV BLD AUTO: 8 FL (ref 6–12)
POTASSIUM SERPL-SCNC: 2.9 MMOL/L (ref 3.7–5.3)
RBC # BLD: 4.08 M/UL (ref 4–5.2)
RBC # BLD: ABNORMAL 10*6/UL
SEG NEUTROPHILS: 61 % (ref 36–66)
SEGMENTED NEUTROPHILS ABSOLUTE COUNT: 5 K/UL (ref 1.3–9.1)
SODIUM BLD-SCNC: 133 MMOL/L (ref 135–144)
TOTAL PROTEIN: 6 G/DL (ref 6.4–8.3)
WBC # BLD: 8.2 K/UL (ref 3.5–11)
WBC # BLD: ABNORMAL 10*3/UL

## 2019-07-25 PROCEDURE — 96365 THER/PROPH/DIAG IV INF INIT: CPT

## 2019-07-25 PROCEDURE — 99284 EMERGENCY DEPT VISIT MOD MDM: CPT

## 2019-07-25 PROCEDURE — G0378 HOSPITAL OBSERVATION PER HR: HCPCS

## 2019-07-25 PROCEDURE — 85025 COMPLETE CBC W/AUTO DIFF WBC: CPT

## 2019-07-25 PROCEDURE — 80053 COMPREHEN METABOLIC PANEL: CPT

## 2019-07-25 PROCEDURE — 2580000003 HC RX 258: Performed by: EMERGENCY MEDICINE

## 2019-07-25 PROCEDURE — 83605 ASSAY OF LACTIC ACID: CPT

## 2019-07-25 PROCEDURE — 36415 COLL VENOUS BLD VENIPUNCTURE: CPT

## 2019-07-25 PROCEDURE — 6360000002 HC RX W HCPCS: Performed by: EMERGENCY MEDICINE

## 2019-07-25 PROCEDURE — 1200000000 HC SEMI PRIVATE

## 2019-07-25 PROCEDURE — 74177 CT ABD & PELVIS W/CONTRAST: CPT

## 2019-07-25 PROCEDURE — 6360000004 HC RX CONTRAST MEDICATION: Performed by: EMERGENCY MEDICINE

## 2019-07-25 RX ORDER — AMIODARONE HYDROCHLORIDE 200 MG/1
100 TABLET ORAL 2 TIMES DAILY
Status: DISCONTINUED | OUTPATIENT
Start: 2019-07-25 | End: 2019-07-29 | Stop reason: HOSPADM

## 2019-07-25 RX ORDER — ONDANSETRON 4 MG/1
4 TABLET, FILM COATED ORAL EVERY 6 HOURS PRN
Status: DISCONTINUED | OUTPATIENT
Start: 2019-07-25 | End: 2019-07-29 | Stop reason: HOSPADM

## 2019-07-25 RX ORDER — SERTRALINE HYDROCHLORIDE 25 MG/1
50 TABLET, FILM COATED ORAL DAILY
COMMUNITY

## 2019-07-25 RX ORDER — POTASSIUM CHLORIDE AND SODIUM CHLORIDE 900; 300 MG/100ML; MG/100ML
INJECTION, SOLUTION INTRAVENOUS CONTINUOUS
Status: DISPENSED | OUTPATIENT
Start: 2019-07-25 | End: 2019-07-25

## 2019-07-25 RX ORDER — DOXYCYCLINE HYCLATE 100 MG
100 TABLET ORAL 2 TIMES DAILY
Status: ON HOLD | COMMUNITY
Start: 2019-07-22 | End: 2019-07-29 | Stop reason: HOSPADM

## 2019-07-25 RX ORDER — MIRTAZAPINE 15 MG/1
7.5 TABLET, FILM COATED ORAL NIGHTLY
Status: DISCONTINUED | OUTPATIENT
Start: 2019-07-25 | End: 2019-07-29 | Stop reason: HOSPADM

## 2019-07-25 RX ORDER — IPRATROPIUM BROMIDE AND ALBUTEROL SULFATE 2.5; .5 MG/3ML; MG/3ML
1 SOLUTION RESPIRATORY (INHALATION) EVERY 8 HOURS PRN
COMMUNITY

## 2019-07-25 RX ORDER — 0.9 % SODIUM CHLORIDE 0.9 %
80 INTRAVENOUS SOLUTION INTRAVENOUS ONCE
Status: COMPLETED | OUTPATIENT
Start: 2019-07-25 | End: 2019-07-25

## 2019-07-25 RX ORDER — ACETAMINOPHEN 325 MG/1
650 TABLET ORAL EVERY 4 HOURS PRN
Status: DISCONTINUED | OUTPATIENT
Start: 2019-07-25 | End: 2019-07-29 | Stop reason: HOSPADM

## 2019-07-25 RX ORDER — LEVOTHYROXINE SODIUM 0.07 MG/1
75 TABLET ORAL DAILY
Status: DISCONTINUED | OUTPATIENT
Start: 2019-07-26 | End: 2019-07-29 | Stop reason: HOSPADM

## 2019-07-25 RX ORDER — DIVALPROEX SODIUM 125 MG/1
125 TABLET, DELAYED RELEASE ORAL 2 TIMES DAILY
COMMUNITY

## 2019-07-25 RX ORDER — OXYCODONE HYDROCHLORIDE AND ACETAMINOPHEN 5; 325 MG/1; MG/1
1 TABLET ORAL EVERY 6 HOURS PRN
Status: DISCONTINUED | OUTPATIENT
Start: 2019-07-25 | End: 2019-07-29 | Stop reason: HOSPADM

## 2019-07-25 RX ORDER — POTASSIUM CHLORIDE 7.45 MG/ML
40 INJECTION INTRAVENOUS ONCE
Status: DISCONTINUED | OUTPATIENT
Start: 2019-07-25 | End: 2019-07-25

## 2019-07-25 RX ORDER — PANTOPRAZOLE SODIUM 40 MG/1
40 TABLET, DELAYED RELEASE ORAL
Status: DISCONTINUED | OUTPATIENT
Start: 2019-07-26 | End: 2019-07-29 | Stop reason: HOSPADM

## 2019-07-25 RX ORDER — SODIUM CHLORIDE 0.9 % (FLUSH) 0.9 %
10 SYRINGE (ML) INJECTION EVERY 12 HOURS SCHEDULED
Status: DISCONTINUED | OUTPATIENT
Start: 2019-07-25 | End: 2019-07-29 | Stop reason: HOSPADM

## 2019-07-25 RX ORDER — DIVALPROEX SODIUM 125 MG/1
125 CAPSULE, COATED PELLETS ORAL 2 TIMES DAILY
Status: DISCONTINUED | OUTPATIENT
Start: 2019-07-25 | End: 2019-07-29 | Stop reason: HOSPADM

## 2019-07-25 RX ORDER — ZOLPIDEM TARTRATE 5 MG/1
5 TABLET ORAL NIGHTLY PRN
Status: DISCONTINUED | OUTPATIENT
Start: 2019-07-25 | End: 2019-07-29 | Stop reason: HOSPADM

## 2019-07-25 RX ORDER — FUROSEMIDE 40 MG/1
40 TABLET ORAL DAILY
Status: DISCONTINUED | OUTPATIENT
Start: 2019-07-26 | End: 2019-07-29 | Stop reason: HOSPADM

## 2019-07-25 RX ORDER — FUROSEMIDE 20 MG/1
20 TABLET ORAL DAILY
COMMUNITY

## 2019-07-25 RX ORDER — IPRATROPIUM BROMIDE AND ALBUTEROL SULFATE 2.5; .5 MG/3ML; MG/3ML
1 SOLUTION RESPIRATORY (INHALATION) EVERY 8 HOURS PRN
Status: DISCONTINUED | OUTPATIENT
Start: 2019-07-25 | End: 2019-07-29 | Stop reason: HOSPADM

## 2019-07-25 RX ORDER — VANCOMYCIN HYDROCHLORIDE 125 MG/1
125 CAPSULE ORAL 2 TIMES DAILY
COMMUNITY
Start: 2019-07-24 | End: 2019-07-30

## 2019-07-25 RX ORDER — VANCOMYCIN HYDROCHLORIDE 125 MG/1
125 CAPSULE ORAL DAILY
COMMUNITY
Start: 2019-07-31 | End: 2019-08-07

## 2019-07-25 RX ORDER — MIRTAZAPINE 7.5 MG/1
7.5 TABLET, FILM COATED ORAL NIGHTLY
COMMUNITY

## 2019-07-25 RX ORDER — ZOLPIDEM TARTRATE 5 MG/1
5 TABLET ORAL NIGHTLY PRN
Status: DISCONTINUED | OUTPATIENT
Start: 2019-07-26 | End: 2019-07-25

## 2019-07-25 RX ORDER — ONDANSETRON 4 MG/1
4 TABLET, FILM COATED ORAL EVERY 6 HOURS PRN
COMMUNITY

## 2019-07-25 RX ORDER — SODIUM CHLORIDE 0.9 % (FLUSH) 0.9 %
10 SYRINGE (ML) INJECTION PRN
Status: DISCONTINUED | OUTPATIENT
Start: 2019-07-25 | End: 2019-07-29 | Stop reason: HOSPADM

## 2019-07-25 RX ADMIN — SODIUM CHLORIDE 80 ML: 9 INJECTION, SOLUTION INTRAVENOUS at 17:30

## 2019-07-25 RX ADMIN — Medication 10 ML: at 17:30

## 2019-07-25 RX ADMIN — IOVERSOL 75 ML: 741 INJECTION INTRA-ARTERIAL; INTRAVENOUS at 17:30

## 2019-07-25 RX ADMIN — POTASSIUM CHLORIDE AND SODIUM CHLORIDE: 900; 300 INJECTION, SOLUTION INTRAVENOUS at 17:53

## 2019-07-25 ASSESSMENT — PAIN SCALES - GENERAL: PAINLEVEL_OUTOF10: 8

## 2019-07-25 ASSESSMENT — ENCOUNTER SYMPTOMS
SHORTNESS OF BREATH: 0
SINUS PRESSURE: 0
NAUSEA: 0
DIARRHEA: 0
SINUS PAIN: 0
COUGH: 0
ABDOMINAL PAIN: 1
CONSTIPATION: 0
WHEEZING: 0
VOMITING: 0
COLOR CHANGE: 0

## 2019-07-25 ASSESSMENT — PAIN DESCRIPTION - PAIN TYPE: TYPE: ACUTE PAIN;SURGICAL PAIN

## 2019-07-25 NOTE — ED PROVIDER NOTES
Calcification of the abdominal aorta is present. There is an elliptical fluid collection involving the anterior abdominal wall along the peritoneum. This may communicate with the subcutaneous and periumbilical soft tissues. This collection measures 6.5 cm in transverse dimension, 1.0 cm in AP dimension and 9.2 cm in craniocaudal dimension. The collection demonstrates peripheral enhancement. The superior margin of the collection appears to communicate with the anterior abdominal wall. There is adjacent subcutaneous stranding. There is no free intraperitoneal air. Bones/Soft Tissues: The bones are demineralized. There is mild degenerative change of the spine. Peripherally enhancing fluid collection along the peritoneal surface of the anterior abdominal wall, most consistent with abscess. This measures approximately 7 x 1 x 9 cm. There is communication with the skin surface superiorly. There is possible communication with the skin surface at the level of the umbilicus. Thick-walled urinary bladder. Although nondistended, consider cystitis. Small left pleural effusion with adjacent consolidation, likely atelectasis. BEDSIDE ULTRASOUND:  Not clinically indicated at this time.       ED COURSE      ED Medication Orders (From admission, onward)    Start Ordered     Status Ordering Provider    07/25/19 1800 07/25/19 1747  0.9% NaCl with KCl 40 mEq infusion  CONTINUOUS      Last MAR action:  Stopped - by Daja Nunez on 07/25/19 at [facility], LIANA KWON    07/25/19 1715 07/25/19 1713  0.9 % sodium chloride bolus  ONCE      Last MAR action:  Stopped - by Briana Abad on 07/25/19 at [id number]    07/25/19 1713 07/25/19 1713  sodium chloride flush 0.9 % injection 10 mL  PRN      Last MAR action:  Given - by Briana Abad on 07/25/19 at 1730 LIANA GREER    07/25/19 1713 07/25/19 1713  ioversol (OPTIRAY) 74 % injection 75 mL  IMG ONCE PRN      Last MAR action:  Given - by Briana Abad on

## 2019-07-26 ENCOUNTER — APPOINTMENT (OUTPATIENT)
Dept: INTERVENTIONAL RADIOLOGY/VASCULAR | Age: 69
DRG: 862 | End: 2019-07-26
Payer: COMMERCIAL

## 2019-07-26 LAB
ABSOLUTE EOS #: 0.6 K/UL (ref 0–0.4)
ABSOLUTE IMMATURE GRANULOCYTE: ABNORMAL K/UL (ref 0–0.3)
ABSOLUTE LYMPH #: 1 K/UL (ref 1–4.8)
ABSOLUTE MONO #: 0.6 K/UL (ref 0.1–1.3)
ANION GAP SERPL CALCULATED.3IONS-SCNC: 9 MMOL/L (ref 9–17)
BASOPHILS # BLD: 1 % (ref 0–2)
BASOPHILS ABSOLUTE: 0 K/UL (ref 0–0.2)
BUN BLDV-MCNC: 9 MG/DL (ref 8–23)
BUN/CREAT BLD: ABNORMAL (ref 9–20)
CALCIUM SERPL-MCNC: 9 MG/DL (ref 8.6–10.4)
CHLORIDE BLD-SCNC: 103 MMOL/L (ref 98–107)
CO2: 27 MMOL/L (ref 20–31)
CREAT SERPL-MCNC: <0.4 MG/DL (ref 0.5–0.9)
CULTURE: ABNORMAL
CULTURE: ABNORMAL
DIFFERENTIAL TYPE: ABNORMAL
DIRECT EXAM: ABNORMAL
DIRECT EXAM: ABNORMAL
EOSINOPHILS RELATIVE PERCENT: 10 % (ref 0–4)
GFR AFRICAN AMERICAN: ABNORMAL ML/MIN
GFR NON-AFRICAN AMERICAN: ABNORMAL ML/MIN
GFR SERPL CREATININE-BSD FRML MDRD: ABNORMAL ML/MIN/{1.73_M2}
GFR SERPL CREATININE-BSD FRML MDRD: ABNORMAL ML/MIN/{1.73_M2}
GLUCOSE BLD-MCNC: 88 MG/DL (ref 70–99)
HCT VFR BLD CALC: 33.1 % (ref 36–46)
HEMOGLOBIN: 10.8 G/DL (ref 12–16)
IMMATURE GRANULOCYTES: ABNORMAL %
INR BLD: 1.2
LYMPHOCYTES # BLD: 15 % (ref 24–44)
Lab: 226.1
MCH RBC QN AUTO: 27.9 PG (ref 26–34)
MCHC RBC AUTO-ENTMCNC: 32.7 G/DL (ref 31–37)
MCV RBC AUTO: 85.5 FL (ref 80–100)
MONOCYTES # BLD: 9 % (ref 1–7)
NRBC AUTOMATED: ABNORMAL PER 100 WBC
PARTIAL THROMBOPLASTIN TIME: 36.2 SEC (ref 24–36)
PDW BLD-RTO: 17.3 % (ref 11.5–14.9)
PLATELET # BLD: 278 K/UL (ref 150–450)
PLATELET ESTIMATE: ABNORMAL
PMV BLD AUTO: 7.8 FL (ref 6–12)
POTASSIUM SERPL-SCNC: 3.9 MMOL/L (ref 3.7–5.3)
PROTHROMBIN TIME: 15.1 SEC (ref 11.8–14.6)
RBC # BLD: 3.87 M/UL (ref 4–5.2)
RBC # BLD: ABNORMAL 10*6/UL
SEG NEUTROPHILS: 65 % (ref 36–66)
SEGMENTED NEUTROPHILS ABSOLUTE COUNT: 4.4 K/UL (ref 1.3–9.1)
SODIUM BLD-SCNC: 139 MMOL/L (ref 135–144)
SPECIMEN DESCRIPTION: ABNORMAL
WBC # BLD: 6.6 K/UL (ref 3.5–11)
WBC # BLD: ABNORMAL 10*3/UL

## 2019-07-26 PROCEDURE — 87077 CULTURE AEROBIC IDENTIFY: CPT

## 2019-07-26 PROCEDURE — 87075 CULTR BACTERIA EXCEPT BLOOD: CPT

## 2019-07-26 PROCEDURE — 99223 1ST HOSP IP/OBS HIGH 75: CPT | Performed by: INTERNAL MEDICINE

## 2019-07-26 PROCEDURE — 6370000000 HC RX 637 (ALT 250 FOR IP): Performed by: INTERNAL MEDICINE

## 2019-07-26 PROCEDURE — 85025 COMPLETE CBC W/AUTO DIFF WBC: CPT

## 2019-07-26 PROCEDURE — 2500000003 HC RX 250 WO HCPCS: Performed by: RADIOLOGY

## 2019-07-26 PROCEDURE — 1200000000 HC SEMI PRIVATE

## 2019-07-26 PROCEDURE — 49406 IMAGE CATH FLUID PERI/RETRO: CPT | Performed by: RADIOLOGY

## 2019-07-26 PROCEDURE — 85610 PROTHROMBIN TIME: CPT

## 2019-07-26 PROCEDURE — 0W9F30Z DRAINAGE OF ABDOMINAL WALL WITH DRAINAGE DEVICE, PERCUTANEOUS APPROACH: ICD-10-PCS | Performed by: RADIOLOGY

## 2019-07-26 PROCEDURE — 80048 BASIC METABOLIC PNL TOTAL CA: CPT

## 2019-07-26 PROCEDURE — 85730 THROMBOPLASTIN TIME PARTIAL: CPT

## 2019-07-26 PROCEDURE — 2709999900 IR ABSCESS DRAINAGE PERC

## 2019-07-26 PROCEDURE — 87205 SMEAR GRAM STAIN: CPT

## 2019-07-26 PROCEDURE — 87186 SC STD MICRODIL/AGAR DIL: CPT

## 2019-07-26 PROCEDURE — 87070 CULTURE OTHR SPECIMN AEROBIC: CPT

## 2019-07-26 PROCEDURE — 87102 FUNGUS ISOLATION CULTURE: CPT

## 2019-07-26 PROCEDURE — 2580000003 HC RX 258: Performed by: INTERNAL MEDICINE

## 2019-07-26 PROCEDURE — 36415 COLL VENOUS BLD VENIPUNCTURE: CPT

## 2019-07-26 PROCEDURE — 6360000002 HC RX W HCPCS: Performed by: RADIOLOGY

## 2019-07-26 RX ORDER — FENTANYL CITRATE 50 UG/ML
INJECTION, SOLUTION INTRAMUSCULAR; INTRAVENOUS
Status: COMPLETED | OUTPATIENT
Start: 2019-07-26 | End: 2019-07-26

## 2019-07-26 RX ORDER — LIDOCAINE HYDROCHLORIDE 10 MG/ML
INJECTION, SOLUTION INFILTRATION; PERINEURAL
Status: COMPLETED | OUTPATIENT
Start: 2019-07-26 | End: 2019-07-26

## 2019-07-26 RX ADMIN — Medication 10 ML: at 00:16

## 2019-07-26 RX ADMIN — DIVALPROEX SODIUM 125 MG: 125 CAPSULE, COATED PELLETS ORAL at 20:19

## 2019-07-26 RX ADMIN — AMIODARONE HYDROCHLORIDE 100 MG: 200 TABLET ORAL at 00:12

## 2019-07-26 RX ADMIN — LIDOCAINE HYDROCHLORIDE 5 ML: 10 INJECTION, SOLUTION INFILTRATION; PERINEURAL at 14:38

## 2019-07-26 RX ADMIN — METOPROLOL TARTRATE 25 MG: 25 TABLET ORAL at 20:18

## 2019-07-26 RX ADMIN — ZOLPIDEM TARTRATE 5 MG: 5 TABLET ORAL at 00:13

## 2019-07-26 RX ADMIN — MOMETASONE FUROATE AND FORMOTEROL FUMARATE DIHYDRATE 2 PUFF: 200; 5 AEROSOL RESPIRATORY (INHALATION) at 20:18

## 2019-07-26 RX ADMIN — FENTANYL CITRATE 50 MCG: 50 INJECTION, SOLUTION INTRAMUSCULAR; INTRAVENOUS at 14:39

## 2019-07-26 RX ADMIN — VANCOMYCIN HYDROCHLORIDE 125 MG: KIT at 21:07

## 2019-07-26 RX ADMIN — DIVALPROEX SODIUM 125 MG: 125 CAPSULE, COATED PELLETS ORAL at 00:14

## 2019-07-26 RX ADMIN — AMIODARONE HYDROCHLORIDE 100 MG: 200 TABLET ORAL at 08:33

## 2019-07-26 RX ADMIN — Medication 10 ML: at 21:07

## 2019-07-26 RX ADMIN — SERTRALINE HYDROCHLORIDE 50 MG: 50 TABLET ORAL at 17:15

## 2019-07-26 RX ADMIN — MIRTAZAPINE 7.5 MG: 15 TABLET, FILM COATED ORAL at 00:13

## 2019-07-26 RX ADMIN — MIRTAZAPINE 7.5 MG: 15 TABLET, FILM COATED ORAL at 20:19

## 2019-07-26 RX ADMIN — Medication 125 MG: at 00:16

## 2019-07-26 RX ADMIN — Medication 10 ML: at 08:37

## 2019-07-26 RX ADMIN — MOMETASONE FUROATE AND FORMOTEROL FUMARATE DIHYDRATE 2 PUFF: 200; 5 AEROSOL RESPIRATORY (INHALATION) at 08:32

## 2019-07-26 RX ADMIN — FENTANYL CITRATE 50 MCG: 50 INJECTION, SOLUTION INTRAMUSCULAR; INTRAVENOUS at 14:47

## 2019-07-26 RX ADMIN — AMIODARONE HYDROCHLORIDE 100 MG: 200 TABLET ORAL at 20:18

## 2019-07-26 RX ADMIN — MOMETASONE FUROATE AND FORMOTEROL FUMARATE DIHYDRATE 2 PUFF: 200; 5 AEROSOL RESPIRATORY (INHALATION) at 00:12

## 2019-07-26 ASSESSMENT — PAIN SCALES - GENERAL: PAINLEVEL_OUTOF10: 5

## 2019-07-26 NOTE — H&P
5-325 MG per tablet Take 1 tablet by mouth every 6 hours as needed for Pain. Yes Historical Provider, MD   budesonide-formoterol (SYMBICORT) 160-4.5 MCG/ACT AERO Inhale 2 puffs into the lungs 2 times daily   Yes Historical Provider, MD   omeprazole (PRILOSEC) 20 MG capsule Take 20 mg by mouth daily. Yes Historical Provider, MD   vancomycin (VANCOCIN) 125 MG capsule Take 125 mg by mouth daily For C. Dif for 7 days starting 7/31/19 7/31/19 8/7/19  Historical Provider, MD   Misc. Devices [facility]) [address] [place] wheelchair with left fupper extremity support  Dx: stroke with left hemiparesis. 7/24/17   Susanna Pavon MD        Allergies:     Penicillins and Amoxicillin    Social History:     Tobacco:    reports that she has been smoking. She has a 30.00 pack-year smoking history. She has never used smokeless tobacco.  Alcohol:      reports that she drank about 28.0 standard drinks of alcohol per week. Drug Use:  reports that she does not use drugs. Family History:     History reviewed. No pertinent family history. Review of Systems:     Positive and Negative as described in HPI. CONSTITUTIONAL:  negative for fevers, chills, sweats, fatigue, weight loss  HEENT:  negative for vision, hearing changes, runny nose, throat pain  RESPIRATORY:  negative for shortness of breath, cough, congestion, wheezing. CARDIOVASCULAR:  negative for chest pain, palpitations.   GASTROINTESTINAL:  negative for nausea, vomiting, diarrhea, constipation, change in bowel habits, abdominal pain   Positive for abdominal wall pain and pus drainage  GENITOURINARY:  negative for difficulty of urination, burning with urination, frequency   INTEGUMENT:  negative for rash, skin lesions, easy bruising   HEMATOLOGIC/LYMPHATIC:  negative for swelling/edema   ALLERGIC/IMMUNOLOGIC:  negative for urticaria , itching  ENDOCRINE:  negative increase in drinking, increase in urination, hot or cold intolerance  MUSCULOSKELETAL:  negative joint pains, muscle aches, swelling of joints  NEUROLOGICAL:  negative for headaches, dizziness, lightheadedness, numbness, pain, tingling extremities  BEHAVIOR/PSYCH:  negative for depression, anxiety    Physical Exam:   BP (!) 124/47   Pulse 69   Temp 98.4 °F (36.9 °C) (Oral)   Resp 20   Ht 5' (1.524 m)   Wt 93 lb 11.1 oz (42.5 kg)   SpO2 93%   BMI 18.30 kg/m²   No results for input(s): POCGLU in the last 72 hours. Missing teeth  General Appearance:  alert, well appearing, and in no acute distress  Mental status: oriented to person, place, and time with normal affect  Head:  normocephalic, atraumatic. Eye: no icterus, redness, pupils equal and reactive, extraocular eye movements intact, conjunctiva clear  Ear: normal external ear, no discharge, hearing intact  Nose:  no drainage noted  Mouth: mucous membranes moist  Neck: supple, no carotid bruits, thyroid not palpable  Lungs: Bilateral equal air entry, clear to ausculation, no wheezing, rales or rhonchi, normal effort  Cardiovascular: normal rate, regular rhythm, no murmur, gallop, rub.   Abdomen: Nontender no guarding laparotomy wound infection small area 1 inch purulent drainage noted  Neurologic: There are no new focal motor or sensory deficits, normal muscle tone and bulk, no abnormal sensation, normal speech, cranial nerves II through XII grossly intact  Skin: No gross lesions, rashes, bruising or bleeding on exposed skin area  Extremities:  peripheral pulses palpable, no pedal edema or calf pain with palpation  Psych: normal affect     Investigations:      Laboratory Testing:  Recent Results (from the past 24 hour(s))   CBC Auto Differential    Collection Time: 07/25/19  4:37 PM   Result Value Ref Range    WBC 8.2 3.5 - 11.0 k/uL    RBC 4.08 4.0 - 5.2 m/uL    Hemoglobin 11.4 (L) 12.0 - 16.0 g/dL    Hematocrit 34.5 (L) 36 - 46 %    MCV 84.6 80 - 100 fL    MCH 27.8 26 - 34 pg    MCHC 32.9 31 - 37 g/dL    RDW 17.2 (H) 11.5 - 14.9 %    Platelets 755 272 - 593 k/uL

## 2019-07-26 NOTE — PLAN OF CARE
All goals  met this shift. Patient is reluctant to turn and lays on left side most of the day. Tolerated pain well after procedure. No falls today.

## 2019-07-27 ENCOUNTER — ANESTHESIA EVENT (OUTPATIENT)
Dept: ENDOSCOPY | Age: 69
DRG: 862 | End: 2019-07-27
Payer: COMMERCIAL

## 2019-07-27 ENCOUNTER — ANESTHESIA (OUTPATIENT)
Dept: ENDOSCOPY | Age: 69
DRG: 862 | End: 2019-07-27
Payer: COMMERCIAL

## 2019-07-27 VITALS — DIASTOLIC BLOOD PRESSURE: 52 MMHG | SYSTOLIC BLOOD PRESSURE: 127 MMHG | OXYGEN SATURATION: 100 %

## 2019-07-27 LAB
ABSOLUTE EOS #: 0.7 K/UL (ref 0–0.4)
ABSOLUTE IMMATURE GRANULOCYTE: ABNORMAL K/UL (ref 0–0.3)
ABSOLUTE LYMPH #: 1.2 K/UL (ref 1–4.8)
ABSOLUTE MONO #: 0.7 K/UL (ref 0.1–1.3)
ANION GAP SERPL CALCULATED.3IONS-SCNC: 9 MMOL/L (ref 9–17)
BASOPHILS # BLD: 0 % (ref 0–2)
BASOPHILS ABSOLUTE: 0 K/UL (ref 0–0.2)
BUN BLDV-MCNC: 10 MG/DL (ref 8–23)
BUN/CREAT BLD: ABNORMAL (ref 9–20)
CALCIUM SERPL-MCNC: 9.3 MG/DL (ref 8.6–10.4)
CHLORIDE BLD-SCNC: 103 MMOL/L (ref 98–107)
CO2: 28 MMOL/L (ref 20–31)
CREAT SERPL-MCNC: <0.4 MG/DL (ref 0.5–0.9)
DIFFERENTIAL TYPE: ABNORMAL
EOSINOPHILS RELATIVE PERCENT: 9 % (ref 0–4)
GFR AFRICAN AMERICAN: ABNORMAL ML/MIN
GFR NON-AFRICAN AMERICAN: ABNORMAL ML/MIN
GFR SERPL CREATININE-BSD FRML MDRD: ABNORMAL ML/MIN/{1.73_M2}
GFR SERPL CREATININE-BSD FRML MDRD: ABNORMAL ML/MIN/{1.73_M2}
GLUCOSE BLD-MCNC: 97 MG/DL (ref 70–99)
HCT VFR BLD CALC: 33.3 % (ref 36–46)
HEMOGLOBIN: 10.9 G/DL (ref 12–16)
IMMATURE GRANULOCYTES: ABNORMAL %
LYMPHOCYTES # BLD: 15 % (ref 24–44)
MCH RBC QN AUTO: 27.9 PG (ref 26–34)
MCHC RBC AUTO-ENTMCNC: 32.7 G/DL (ref 31–37)
MCV RBC AUTO: 85.2 FL (ref 80–100)
MONOCYTES # BLD: 8 % (ref 1–7)
NRBC AUTOMATED: ABNORMAL PER 100 WBC
PDW BLD-RTO: 17.1 % (ref 11.5–14.9)
PLATELET # BLD: 304 K/UL (ref 150–450)
PLATELET ESTIMATE: ABNORMAL
PMV BLD AUTO: 8.1 FL (ref 6–12)
POTASSIUM SERPL-SCNC: 3.9 MMOL/L (ref 3.7–5.3)
RBC # BLD: 3.91 M/UL (ref 4–5.2)
RBC # BLD: ABNORMAL 10*6/UL
SEG NEUTROPHILS: 68 % (ref 36–66)
SEGMENTED NEUTROPHILS ABSOLUTE COUNT: 5.6 K/UL (ref 1.3–9.1)
SODIUM BLD-SCNC: 140 MMOL/L (ref 135–144)
WBC # BLD: 8.3 K/UL (ref 3.5–11)
WBC # BLD: ABNORMAL 10*3/UL

## 2019-07-27 PROCEDURE — 7100000000 HC PACU RECOVERY - FIRST 15 MIN: Performed by: SURGERY

## 2019-07-27 PROCEDURE — 36415 COLL VENOUS BLD VENIPUNCTURE: CPT

## 2019-07-27 PROCEDURE — 1200000000 HC SEMI PRIVATE

## 2019-07-27 PROCEDURE — 2580000003 HC RX 258: Performed by: INTERNAL MEDICINE

## 2019-07-27 PROCEDURE — 2709999900 HC NON-CHARGEABLE SUPPLY: Performed by: SURGERY

## 2019-07-27 PROCEDURE — 6360000002 HC RX W HCPCS: Performed by: ANESTHESIOLOGY

## 2019-07-27 PROCEDURE — 85025 COMPLETE CBC W/AUTO DIFF WBC: CPT

## 2019-07-27 PROCEDURE — 80048 BASIC METABOLIC PNL TOTAL CA: CPT

## 2019-07-27 PROCEDURE — 6370000000 HC RX 637 (ALT 250 FOR IP): Performed by: INTERNAL MEDICINE

## 2019-07-27 PROCEDURE — 99232 SBSQ HOSP IP/OBS MODERATE 35: CPT | Performed by: INTERNAL MEDICINE

## 2019-07-27 PROCEDURE — 6370000000 HC RX 637 (ALT 250 FOR IP): Performed by: SURGERY

## 2019-07-27 PROCEDURE — 6360000002 HC RX W HCPCS: Performed by: INTERNAL MEDICINE

## 2019-07-27 PROCEDURE — 7100000001 HC PACU RECOVERY - ADDTL 15 MIN: Performed by: SURGERY

## 2019-07-27 PROCEDURE — 0DP68UZ REMOVAL OF FEEDING DEVICE FROM STOMACH, VIA NATURAL OR ARTIFICIAL OPENING ENDOSCOPIC: ICD-10-PCS | Performed by: SURGERY

## 2019-07-27 PROCEDURE — 2580000003 HC RX 258: Performed by: SURGERY

## 2019-07-27 PROCEDURE — 3609038000 HC PEG TUBE REMOVAL: Performed by: SURGERY

## 2019-07-27 PROCEDURE — 3700000001 HC ADD 15 MINUTES (ANESTHESIA): Performed by: SURGERY

## 2019-07-27 PROCEDURE — 2500000003 HC RX 250 WO HCPCS: Performed by: ANESTHESIOLOGY

## 2019-07-27 PROCEDURE — 3700000000 HC ANESTHESIA ATTENDED CARE: Performed by: SURGERY

## 2019-07-27 PROCEDURE — 2580000003 HC RX 258: Performed by: ANESTHESIOLOGY

## 2019-07-27 RX ORDER — ONDANSETRON 2 MG/ML
4 INJECTION INTRAMUSCULAR; INTRAVENOUS
Status: DISCONTINUED | OUTPATIENT
Start: 2019-07-27 | End: 2019-07-27 | Stop reason: HOSPADM

## 2019-07-27 RX ORDER — SODIUM CHLORIDE, SODIUM LACTATE, POTASSIUM CHLORIDE, CALCIUM CHLORIDE 600; 310; 30; 20 MG/100ML; MG/100ML; MG/100ML; MG/100ML
INJECTION, SOLUTION INTRAVENOUS CONTINUOUS PRN
Status: DISCONTINUED | OUTPATIENT
Start: 2019-07-27 | End: 2019-07-27 | Stop reason: SDUPTHER

## 2019-07-27 RX ORDER — LABETALOL 20 MG/4 ML (5 MG/ML) INTRAVENOUS SYRINGE
5 EVERY 10 MIN PRN
Status: DISCONTINUED | OUTPATIENT
Start: 2019-07-27 | End: 2019-07-27 | Stop reason: HOSPADM

## 2019-07-27 RX ORDER — MORPHINE SULFATE 2 MG/ML
2 INJECTION, SOLUTION INTRAMUSCULAR; INTRAVENOUS EVERY 5 MIN PRN
Status: DISCONTINUED | OUTPATIENT
Start: 2019-07-27 | End: 2019-07-27 | Stop reason: HOSPADM

## 2019-07-27 RX ORDER — HYDROCODONE BITARTRATE AND ACETAMINOPHEN 5; 325 MG/1; MG/1
1 TABLET ORAL PRN
Status: DISCONTINUED | OUTPATIENT
Start: 2019-07-27 | End: 2019-07-27 | Stop reason: HOSPADM

## 2019-07-27 RX ORDER — DIPHENHYDRAMINE HYDROCHLORIDE 50 MG/ML
12.5 INJECTION INTRAMUSCULAR; INTRAVENOUS
Status: DISCONTINUED | OUTPATIENT
Start: 2019-07-27 | End: 2019-07-27 | Stop reason: HOSPADM

## 2019-07-27 RX ORDER — FENTANYL CITRATE 50 UG/ML
50 INJECTION, SOLUTION INTRAMUSCULAR; INTRAVENOUS EVERY 5 MIN PRN
Status: DISCONTINUED | OUTPATIENT
Start: 2019-07-27 | End: 2019-07-27 | Stop reason: HOSPADM

## 2019-07-27 RX ORDER — METOCLOPRAMIDE HYDROCHLORIDE 5 MG/ML
10 INJECTION INTRAMUSCULAR; INTRAVENOUS
Status: DISCONTINUED | OUTPATIENT
Start: 2019-07-27 | End: 2019-07-27 | Stop reason: HOSPADM

## 2019-07-27 RX ORDER — HYDRALAZINE HYDROCHLORIDE 20 MG/ML
5 INJECTION INTRAMUSCULAR; INTRAVENOUS EVERY 10 MIN PRN
Status: DISCONTINUED | OUTPATIENT
Start: 2019-07-27 | End: 2019-07-27 | Stop reason: HOSPADM

## 2019-07-27 RX ORDER — PROPOFOL 10 MG/ML
INJECTION, EMULSION INTRAVENOUS PRN
Status: DISCONTINUED | OUTPATIENT
Start: 2019-07-27 | End: 2019-07-27 | Stop reason: SDUPTHER

## 2019-07-27 RX ORDER — LIDOCAINE HYDROCHLORIDE 10 MG/ML
INJECTION, SOLUTION EPIDURAL; INFILTRATION; INTRACAUDAL; PERINEURAL PRN
Status: DISCONTINUED | OUTPATIENT
Start: 2019-07-27 | End: 2019-07-27 | Stop reason: SDUPTHER

## 2019-07-27 RX ORDER — MEPERIDINE HYDROCHLORIDE 25 MG/ML
12.5 INJECTION INTRAMUSCULAR; INTRAVENOUS; SUBCUTANEOUS EVERY 5 MIN PRN
Status: DISCONTINUED | OUTPATIENT
Start: 2019-07-27 | End: 2019-07-27 | Stop reason: HOSPADM

## 2019-07-27 RX ORDER — FENTANYL CITRATE 50 UG/ML
25 INJECTION, SOLUTION INTRAMUSCULAR; INTRAVENOUS EVERY 5 MIN PRN
Status: DISCONTINUED | OUTPATIENT
Start: 2019-07-27 | End: 2019-07-27 | Stop reason: HOSPADM

## 2019-07-27 RX ORDER — HYDROCODONE BITARTRATE AND ACETAMINOPHEN 5; 325 MG/1; MG/1
2 TABLET ORAL PRN
Status: DISCONTINUED | OUTPATIENT
Start: 2019-07-27 | End: 2019-07-27 | Stop reason: HOSPADM

## 2019-07-27 RX ADMIN — VANCOMYCIN HYDROCHLORIDE 750 MG: 5 INJECTION, POWDER, LYOPHILIZED, FOR SOLUTION INTRAVENOUS at 16:16

## 2019-07-27 RX ADMIN — SODIUM CHLORIDE, POTASSIUM CHLORIDE, SODIUM LACTATE AND CALCIUM CHLORIDE: 600; 310; 30; 20 INJECTION, SOLUTION INTRAVENOUS at 09:56

## 2019-07-27 RX ADMIN — PROPOFOL 25 MG: 10 INJECTION, EMULSION INTRAVENOUS at 10:05

## 2019-07-27 RX ADMIN — VANCOMYCIN HYDROCHLORIDE 125 MG: KIT at 23:21

## 2019-07-27 RX ADMIN — MOMETASONE FUROATE AND FORMOTEROL FUMARATE DIHYDRATE 2 PUFF: 200; 5 AEROSOL RESPIRATORY (INHALATION) at 08:52

## 2019-07-27 RX ADMIN — PROPOFOL 25 MG: 10 INJECTION, EMULSION INTRAVENOUS at 10:00

## 2019-07-27 RX ADMIN — Medication 10 ML: at 08:53

## 2019-07-27 RX ADMIN — ONDANSETRON HYDROCHLORIDE 4 MG: 4 TABLET, FILM COATED ORAL at 00:42

## 2019-07-27 RX ADMIN — Medication 10 ML: at 23:24

## 2019-07-27 RX ADMIN — ONDANSETRON HYDROCHLORIDE 4 MG: 4 TABLET, FILM COATED ORAL at 23:24

## 2019-07-27 RX ADMIN — PROPOFOL 25 MG: 10 INJECTION, EMULSION INTRAVENOUS at 10:09

## 2019-07-27 RX ADMIN — AMIODARONE HYDROCHLORIDE 100 MG: 200 TABLET ORAL at 23:20

## 2019-07-27 RX ADMIN — LIDOCAINE HYDROCHLORIDE 20 MG: 10 INJECTION, SOLUTION EPIDURAL; INFILTRATION; INTRACAUDAL; PERINEURAL at 10:00

## 2019-07-27 RX ADMIN — MIRTAZAPINE 7.5 MG: 15 TABLET, FILM COATED ORAL at 23:20

## 2019-07-27 RX ADMIN — DIVALPROEX SODIUM 125 MG: 125 CAPSULE, COATED PELLETS ORAL at 23:20

## 2019-07-27 RX ADMIN — MOMETASONE FUROATE AND FORMOTEROL FUMARATE DIHYDRATE 2 PUFF: 200; 5 AEROSOL RESPIRATORY (INHALATION) at 23:21

## 2019-07-27 ASSESSMENT — PAIN DESCRIPTION - LOCATION
LOCATION: ABDOMEN
LOCATION: BACK

## 2019-07-27 ASSESSMENT — PULMONARY FUNCTION TESTS
PIF_VALUE: 0
PIF_VALUE: 1
PIF_VALUE: 1
PIF_VALUE: 0
PIF_VALUE: 1
PIF_VALUE: 0
PIF_VALUE: 1

## 2019-07-27 ASSESSMENT — COPD QUESTIONNAIRES: CAT_SEVERITY: NO INTERVAL CHANGE

## 2019-07-27 ASSESSMENT — PAIN DESCRIPTION - PAIN TYPE: TYPE: CHRONIC PAIN

## 2019-07-27 ASSESSMENT — PAIN DESCRIPTION - DESCRIPTORS: DESCRIPTORS: ACHING

## 2019-07-27 ASSESSMENT — PAIN DESCRIPTION - FREQUENCY: FREQUENCY: INTERMITTENT

## 2019-07-27 ASSESSMENT — PAIN DESCRIPTION - ORIENTATION: ORIENTATION: LOWER

## 2019-07-27 ASSESSMENT — PAIN SCALES - GENERAL
PAINLEVEL_OUTOF10: 0
PAINLEVEL_OUTOF10: 3
PAINLEVEL_OUTOF10: 0

## 2019-07-27 NOTE — ANESTHESIA POSTPROCEDURE EVALUATION
POST- ANESTHESIA EVALUATION       Pt Name: Daphnie Ansari  MRN: [id number]  YOB: 1950  Date of evaluation: 7/27/2019  Time:  10:28 AM      BP (!) 107/47   Pulse 60   Temp 98.2 °F (36.8 °C) (Infrared)   Resp 17   Ht 5' (1.524 m)   Wt 93 lb 11.1 oz (42.5 kg)   SpO2 100%   BMI 18.30 kg/m²      Consciousness Level  Awake  Cardiopulmonary Status  Stable  Pain Adequately Treated YES  Nausea / Vomiting  NO  Adequate Hydration  YES  Anesthesia Related Complications NONE      Electronically signed by Roro Tyler MD on 7/27/2019 at 10:28 AM       Department of Anesthesiology  Postprocedure Note    Patient: Daphnie Ansari  MRN: [id number]  YOB: 1950  Date of evaluation: 7/27/2019  Time:  10:28 AM     Procedure Summary     Date:  07/27/19 Room / Location:  [address] [place] [address] 01 / [address] [place] [address]    Anesthesia Start:  8170 Anesthesia Stop:  4925    Procedure:  EGD ESOPHAGOGASTRODUODENOSCOPY WITH REMOVAL OF PEG TUBE (N/A Esophagus) Diagnosis:       PEG (percutaneous endoscopic gastrostomy) adjustment/replacement/removal (HCC)      (pt no longer needs, remove PEG)    Surgeon:  Lasha Goyal DO Responsible Provider:  Roro Tyler MD    Anesthesia Type:  MAC ASA Status:  3          Anesthesia Type: MAC    Kalyan Phase I:      Kalyan Phase II:      Last vitals: Reviewed and per EMR flowsheets.        Anesthesia Post Evaluation

## 2019-07-27 NOTE — OP NOTE
Operative Note      PATIENT NAME: [facility]  MEDICAL RECORD NO. [id number]  DATE: 7/27/2019  SURGEON: Gautam Kaufman DO, FACS   PRIMARY CARE PHSYICIAN: Desean Steele DO     PROCEDURE PERFORMED:  7/27/2019  PREOPERATIVE DIAGNOSES: PEG status  POSTOPERATIVE DIAGNOSIS: same  PROCEDURE PERFORMED:  EGD with snare foreign body removal  SURGEON:  Howie Estrada DO, FACS   ANESTHESIA:   MAC  ESTIMATED BLOOD LOSS:  None   SPECIMENS:  None   COMPLICATIONS:  None immediately appreciated. DISCUSSION:  Austin Barber is a 68y.o.-year-old female who had a PEG placed in the past. She no longer uses it. She wanted to have it removed. After a history and physical examination was performed, potential diagnostic and therapeutic modalities were discussed with the patient. Radiographic and endoscopic studies were discussed. The risks, complications and benefits were reviewed. She was given an opportunity to ask questions and once answered informed consent was obtained. She was brought to the Endoscopy Suite on 7/27/2019 for the procedure. PROCEDURE:  The patient was brought to the Endoscopy Suite, placed in left lateral [place]menta position, placed under cardiac, telemetry, blood pressure and pulse oximetry monitoring, placed MAC anesthesia. The posterior pharynx was sprayed with Cetacaine spray and a bite block placed in the patient's mouth. Under direct visualization, the videogastroscope was inserted through the oropharynx, cannulating the cricopharyngeus. The esophagus was then gently insufflated and the scope advanced under direct visualization to the GE junction. Esophageal contours were within normal limits. The distal esophagus showed no ulceration or esophagitis. The scope was advanced into the stomach, stomach gently insufflated. Gastric contours were within normal limits. Mucosa was unremarkable. There was no evidence of ulceration or bleeding evident at this time.    A J maneuver performed in the stomach failed to reveal any fundic pathology or hiatal hernia. The pylorus was easily cannulated. First and second portions of duodenum were well visualized without pathology. The internal button of the PEG tube was encircled with a snare and the tube was cut at the skin. The button was pulled out with the scope through the mouth after the stomach was decompressed. The patient was brought out of anesthesia, tolerated the procedure well without any immediate complication evident.         Arturo Hudson, [address]

## 2019-07-27 NOTE — ANESTHESIA PRE PROCEDURE
Department of Anesthesiology  Preprocedure Note       Name:  Janine Correa   Age:  68 y.o.  :  1950                                          MRN:  [id number]         Date:  2019      Surgeon: Ed Lucas):  Diego Parsons DO    Procedure: EGD ESOPHAGOGASTRODUODENOSCOPY (N/A Esophagus)    Medications prior to admission:   Prior to Admission medications    Medication Sig Start Date End Date Taking? Authorizing Provider   calcium carbonate-vitamin D (CALTRATE) 600-400 MG-UNIT TABS per tab Take 1 tablet by mouth daily   Yes Historical Provider, MD   divalproex (DEPAKOTE) 125 MG DR tablet Take 125 mg by mouth 2 times daily   Yes Historical Provider, MD   doxycycline hyclate (VIBRA-TABS) 100 MG tablet Take 100 mg by mouth 2 times daily For cellulitis for 7 days starting 19 Yes Historical Provider, MD   ipratropium-albuterol (DUONEB) 0.5-2.5 (3) MG/3ML SOLN nebulizer solution Inhale 1 vial into the lungs every 8 hours as needed for Shortness of Breath   Yes Historical Provider, MD   furosemide (LASIX) 40 MG tablet Take 40 mg by mouth daily   Yes Historical Provider, MD   mirtazapine (REMERON) 7.5 MG tablet Take 7.5 mg by mouth nightly   Yes Historical Provider, MD   ondansetron (ZOFRAN) 4 MG tablet Take 4 mg by mouth every 6 hours as needed for Nausea or Vomiting   Yes Historical Provider, MD   sertraline (ZOLOFT) 25 MG tablet Take 50 mg by mouth daily   Yes Historical Provider, MD   vancomycin (VANCOCIN) 125 MG capsule Take 125 mg by mouth 2 times daily For C.  Dif for 7 days starting 19 Yes Historical Provider, MD   amiodarone (PACERONE) 100 MG tablet Take 1 tablet by mouth 2 times daily 19  Yes Colin Prasad MD   metoprolol tartrate (LOPRESSOR) 50 MG tablet Take 1 tablet by mouth 2 times daily 19  Yes Colin Prasad MD   levothyroxine (SYNTHROID) 75 MCG tablet Take 1 tablet by mouth Daily 19  Yes Colin Prasad MD   oxyCODONE-acetaminophen (PERCOCET) Oral BID Destinee Goldstein MD   125 mg at 07/26/19 2019    furosemide (LASIX) tablet 40 mg  40 mg Oral Daily Destinee Goldstein MD        ipratropium-albuterol (DUONEB) nebulizer solution 3 mL  1 vial Inhalation Q8H PRN Destinee Goldstein MD        levothyroxine (SYNTHROID) tablet 75 mcg  75 mcg Oral Daily Destinee Goldstein MD        metoprolol tartrate (LOPRESSOR) tablet 25 mg  25 mg Oral BID Destinee Goldstein MD   25 mg at 07/26/19 2018    mirtazapine (REMERON) tablet 7.5 mg  7.5 mg Oral Nightly Angelina Kapoor MD   7.5 mg at 07/26/19 2019    pantoprazole (PROTONIX) tablet 40 mg  40 mg Oral QAM AC Angelina Kapoor MD        ondansetron (ZOFRAN) tablet 4 mg  4 mg Oral Q6H PRN Destinee Goldstein MD   4 mg at 07/27/19 0042    oxyCODONE-acetaminophen (PERCOCET) 5-325 MG per tablet 1 tablet  1 tablet Oral Q6H PRN Destinee Goldstein MD        sertraline (ZOLOFT) tablet 50 mg  50 mg Oral Daily Destinee Goldstein MD   50 mg at 07/26/19 1715    zolpidem (AMBIEN) tablet 5 mg  5 mg Oral Nightly PRN Destinee Goldstein MD   5 mg at 07/26/19 0013       Allergies:     Allergies   Allergen Reactions    Penicillins Shortness Of Breath and Rash    Amoxicillin        Problem List:    Patient Active Problem List   Diagnosis Code    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    CVA (cerebral vascular accident) ([address] I63.9    Abnormal computed tomography of cervical spine R93.7    Weight loss R63.4    Functional gait abnormality R26.89    Left knee pain M25.562    Knee pain, left M25.562    Acute pain of left knee M25.562    Sepsis due to urinary tract infection (HCC) A41.9, N39.0    Cystitis N30.90    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    Septic shock (HCC) A41.9, R65.21    Leukemoid reaction D72.823    Aspiration pneumonia of right lower lobe due to vomit (HCC) J69.0    MRSA carrier Z22.322    Urinary retention R33.9    Abdominal distention R14.0    Elevated CEA R97.0    Elevated CA 19-9 level R97.8    Cholecystitis K81.9    CRP elevated R79.82    Elevated procalcitonin R79.89    Bandemia D72.825    Centrilobular emphysema (HCC) J43.2    Hemorrhage of rectum and anus K62.5    GI bleed K92.2    Anemia D64.9    Small bowel obstruction (HCC) K56.609    Anxiety disorder F41.9    Noncompliance Z91.19    Acute respiratory failure (HCC) J96.00    Chronic obstructive pulmonary disease, unspecified (HCC) J44.9    Bacteremia due to coagulase-negative Staphylococcus R78.81    Bradycardia R00.1    Fever R50.9    Abdominal wall abscess at site of surgical wound T81.49XA       Past Medical History:        Diagnosis Date    Abnormal computed tomography of cervical spine     sclerotic bone appearance     CVA (cerebral vascular accident) ([address]     left  side weakness    GERD (gastroesophageal reflux disease)     Hypertension     Paraproteinemia     Weight loss        Past Surgical History:        Procedure Laterality Date    ABDOMEN SURGERY N/A 5/25/2019    ABDOMEN DEBRIDEMENT WOUND CLOSURE REOPENING OF RECENT LAPOROATOMY, ABDOMINAL WASHOUT, REPAIR FASCIAL DEHISENCE WITH MESH performed by Janie Giron DO at [facility] 5/15/2019    CHOLECYSTECTOMY performed by Janie Giron DO at [facility] 5/25/2019    GASTROSTOMY TUBE PLACEMENT performed by Janie Giron DO at [place] [address]  4/14/2019         LAPAROSCOPY N/A 5/15/2019    LAPAROSCOPY EXPLORATORY CONVERTED TO EXPLORATORY LAPAROTOMY/ RIGHT COLON RESECTION AND ANASTAMOSIS/ OPEN CHOLECYSTECTOMY/ EXTENSIVE LYSIS OF ADHESIONS performed by Janie Giron DO at [facility]  07/2011    Pacemaker is Medtronic Revo (compatible). Leads placed in 1995 are NOT MRI compatible. Placed at [facility]. V's per Dr. Marie Monk can not have an MRI.     UPPER GASTROINTESTINAL ENDOSCOPY N/A 4/16/2019    EGD ESOPHAGOGASTRODUODENOSCOPY @ BEDSIDE  ICU 2002 performed by Wilman Kelly MD at [facility] History:    Social History Tobacco Use    Smoking status: Current Some Day Smoker     Packs/day: 1.00     Years: 30.00     Pack years: 30.00    Smokeless tobacco: Never Used   Substance Use Topics    Alcohol use: Not Currently     Alcohol/week: 28.0 standard drinks     Types: 28 Glasses of wine per week                                Ready to quit: Not Answered  Counseling given: Not Answered      Vital Signs (Current):   Vitals:    07/26/19 1455 07/26/19 1523 07/26/19 1835 07/27/19 0658   BP: 115/66 (!) 80/31 (!) 117/52 (!) 115/41   Pulse: 75 65 67 58   Resp: 15 20 20 18   Temp:  97.5 °F (36.4 °C) 97.9 °F (36.6 °C) 98.5 °F (36.9 °C)   TempSrc:  Oral Oral Oral   SpO2: 96% 95% 97% 98%   Weight:       Height:                                                  BP Readings from Last 3 Encounters:   07/27/19 (!) 115/41   05/31/19 128/67   05/25/19 (!) 199/92       NPO Status: Time of last liquid consumption: 2300                        Time of last solid consumption: 1900                        Date of last liquid consumption: 07/26/19                        Date of last solid food consumption: 07/26/19    BMI:   Wt Readings from Last 3 Encounters:   07/25/19 93 lb 11.1 oz (42.5 kg)   05/31/19 118 lb 2.7 oz (53.6 kg)   03/30/19 89 lb (40.4 kg)     Body mass index is 18.3 kg/m².     CBC:   Lab Results   Component Value Date    WBC 8.3 07/27/2019    RBC 3.91 07/27/2019    RBC 3.66 05/23/2012    HGB 10.9 07/27/2019    HCT 33.3 07/27/2019    MCV 85.2 07/27/2019    RDW 17.1 07/27/2019     07/27/2019     05/23/2012       CMP:   Lab Results   Component Value Date     07/27/2019    K 3.9 07/27/2019     07/27/2019    CO2 28 07/27/2019    BUN 10 07/27/2019    CREATININE <0.40 07/27/2019    GFRAA CANNOT BE CALCULATED 07/27/2019    LABGLOM CANNOT BE CALCULATED 07/27/2019    GLUCOSE 97 07/27/2019    GLUCOSE 87 05/21/2012    PROT 6.0 07/25/2019    CALCIUM 9.3 07/27/2019    BILITOT <0.15 07/25/2019    ALKPHOS 64 07/25/2019    AST 12 07/25/2019    ALT 7 07/25/2019       POC Tests: No results for input(s): POCGLU, POCNA, POCK, POCCL, POCBUN, POCHEMO, POCHCT in the last 72 hours. Coags:   Lab Results   Component Value Date    PROTIME 15.1 07/26/2019    PROTIME 10.9 03/28/2012    INR 1.2 07/26/2019    APTT 36.2 07/26/2019       HCG (If Applicable): No results found for: PREGTESTUR, PREGSERUM, HCG, HCGQUANT     ABGs:   Lab Results   Component Value Date    PHART 7.487 05/20/2019    PO2ART 62.2 05/20/2019    VEL9REY 48.9 05/20/2019    FNA2YBH 37.0 05/20/2019    W0DWGPEL 92.3 05/20/2019        Type & Screen (If Applicable):  No results found for: LABABO, [address]    Anesthesia Evaluation  Patient summary reviewed and Nursing notes reviewed no history of anesthetic complications:   Airway: Mallampati: II  TM distance: >3 FB   Neck ROM: full  Mouth opening: > = 3 FB Dental:          Pulmonary: breath sounds clear to auscultation  (+) COPD: no interval change,      (-) rhonchi, wheezes and rales                           Cardiovascular:    (+) hypertension: no interval change, pacemaker: pacemaker,     (-) murmur, weak pulses,  friction rub, systolic click, carotid bruit,  JVD and peripheral edema    ECG reviewed  Rhythm: regular  Rate: normal  Echocardiogram reviewed               ROS comment: LV EF 45%     Neuro/Psych:   (+) CVA: residual symptoms, psychiatric history:            GI/Hepatic/Renal:   (+) GERD: no interval change,           Endo/Other:                     Abdominal:           Vascular:                                      Anesthesia Plan      MAC     ASA 3       Induction: intravenous. Anesthetic plan and risks discussed with patient.                       Adenike Brand MD   7/27/2019

## 2019-07-28 LAB
ABSOLUTE EOS #: 0.6 K/UL (ref 0–0.4)
ABSOLUTE IMMATURE GRANULOCYTE: ABNORMAL K/UL (ref 0–0.3)
ABSOLUTE LYMPH #: 1.3 K/UL (ref 1–4.8)
ABSOLUTE MONO #: 0.6 K/UL (ref 0.1–1.3)
ANION GAP SERPL CALCULATED.3IONS-SCNC: 9 MMOL/L (ref 9–17)
BASOPHILS # BLD: 0 % (ref 0–2)
BASOPHILS ABSOLUTE: 0 K/UL (ref 0–0.2)
BUN BLDV-MCNC: 8 MG/DL (ref 8–23)
BUN/CREAT BLD: ABNORMAL (ref 9–20)
CALCIUM SERPL-MCNC: 8.6 MG/DL (ref 8.6–10.4)
CHLORIDE BLD-SCNC: 99 MMOL/L (ref 98–107)
CO2: 27 MMOL/L (ref 20–31)
CREAT SERPL-MCNC: <0.4 MG/DL (ref 0.5–0.9)
DIFFERENTIAL TYPE: ABNORMAL
EOSINOPHILS RELATIVE PERCENT: 8 % (ref 0–4)
GFR AFRICAN AMERICAN: ABNORMAL ML/MIN
GFR NON-AFRICAN AMERICAN: ABNORMAL ML/MIN
GFR SERPL CREATININE-BSD FRML MDRD: ABNORMAL ML/MIN/{1.73_M2}
GFR SERPL CREATININE-BSD FRML MDRD: ABNORMAL ML/MIN/{1.73_M2}
GLUCOSE BLD-MCNC: 105 MG/DL (ref 70–99)
HCT VFR BLD CALC: 30.8 % (ref 36–46)
HEMOGLOBIN: 10.1 G/DL (ref 12–16)
IMMATURE GRANULOCYTES: ABNORMAL %
LYMPHOCYTES # BLD: 18 % (ref 24–44)
MCH RBC QN AUTO: 28.1 PG (ref 26–34)
MCHC RBC AUTO-ENTMCNC: 32.8 G/DL (ref 31–37)
MCV RBC AUTO: 85.7 FL (ref 80–100)
MONOCYTES # BLD: 9 % (ref 1–7)
NRBC AUTOMATED: ABNORMAL PER 100 WBC
PDW BLD-RTO: 17.2 % (ref 11.5–14.9)
PLATELET # BLD: 288 K/UL (ref 150–450)
PLATELET ESTIMATE: ABNORMAL
PMV BLD AUTO: 7.6 FL (ref 6–12)
POTASSIUM SERPL-SCNC: 4.3 MMOL/L (ref 3.7–5.3)
RBC # BLD: 3.6 M/UL (ref 4–5.2)
RBC # BLD: ABNORMAL 10*6/UL
SEG NEUTROPHILS: 65 % (ref 36–66)
SEGMENTED NEUTROPHILS ABSOLUTE COUNT: 4.6 K/UL (ref 1.3–9.1)
SODIUM BLD-SCNC: 135 MMOL/L (ref 135–144)
WBC # BLD: 7.1 K/UL (ref 3.5–11)
WBC # BLD: ABNORMAL 10*3/UL

## 2019-07-28 PROCEDURE — 2580000003 HC RX 258: Performed by: SURGERY

## 2019-07-28 PROCEDURE — 6360000002 HC RX W HCPCS: Performed by: INTERNAL MEDICINE

## 2019-07-28 PROCEDURE — 2580000003 HC RX 258: Performed by: INTERNAL MEDICINE

## 2019-07-28 PROCEDURE — 36415 COLL VENOUS BLD VENIPUNCTURE: CPT

## 2019-07-28 PROCEDURE — 85025 COMPLETE CBC W/AUTO DIFF WBC: CPT

## 2019-07-28 PROCEDURE — 80048 BASIC METABOLIC PNL TOTAL CA: CPT

## 2019-07-28 PROCEDURE — 6360000002 HC RX W HCPCS: Performed by: SURGERY

## 2019-07-28 PROCEDURE — 99232 SBSQ HOSP IP/OBS MODERATE 35: CPT | Performed by: INTERNAL MEDICINE

## 2019-07-28 PROCEDURE — 6370000000 HC RX 637 (ALT 250 FOR IP): Performed by: SURGERY

## 2019-07-28 PROCEDURE — 1200000000 HC SEMI PRIVATE

## 2019-07-28 RX ADMIN — SERTRALINE HYDROCHLORIDE 50 MG: 50 TABLET ORAL at 11:45

## 2019-07-28 RX ADMIN — MIRTAZAPINE 7.5 MG: 15 TABLET, FILM COATED ORAL at 21:36

## 2019-07-28 RX ADMIN — PANTOPRAZOLE SODIUM 40 MG: 40 TABLET, DELAYED RELEASE ORAL at 06:39

## 2019-07-28 RX ADMIN — Medication 10 ML: at 21:38

## 2019-07-28 RX ADMIN — ENOXAPARIN SODIUM 30 MG: 30 INJECTION SUBCUTANEOUS at 11:45

## 2019-07-28 RX ADMIN — METOPROLOL TARTRATE 25 MG: 25 TABLET ORAL at 11:46

## 2019-07-28 RX ADMIN — Medication 10 ML: at 12:55

## 2019-07-28 RX ADMIN — LEVOTHYROXINE SODIUM 75 MCG: 75 TABLET ORAL at 06:39

## 2019-07-28 RX ADMIN — CEFEPIME 2 G: 2 INJECTION, POWDER, FOR SOLUTION INTRAVENOUS at 12:54

## 2019-07-28 RX ADMIN — DIVALPROEX SODIUM 125 MG: 125 CAPSULE, COATED PELLETS ORAL at 11:47

## 2019-07-28 RX ADMIN — VANCOMYCIN HYDROCHLORIDE 750 MG: 5 INJECTION, POWDER, LYOPHILIZED, FOR SOLUTION INTRAVENOUS at 16:22

## 2019-07-28 RX ADMIN — DIVALPROEX SODIUM 125 MG: 125 CAPSULE, COATED PELLETS ORAL at 21:36

## 2019-07-28 RX ADMIN — VANCOMYCIN HYDROCHLORIDE 125 MG: KIT at 12:54

## 2019-07-28 RX ADMIN — MOMETASONE FUROATE AND FORMOTEROL FUMARATE DIHYDRATE 2 PUFF: 200; 5 AEROSOL RESPIRATORY (INHALATION) at 11:48

## 2019-07-28 RX ADMIN — VANCOMYCIN HYDROCHLORIDE 125 MG: KIT at 21:36

## 2019-07-28 RX ADMIN — AMIODARONE HYDROCHLORIDE 100 MG: 200 TABLET ORAL at 21:35

## 2019-07-28 RX ADMIN — FUROSEMIDE 40 MG: 40 TABLET ORAL at 11:46

## 2019-07-28 RX ADMIN — Medication 1 TABLET: at 11:46

## 2019-07-28 RX ADMIN — MOMETASONE FUROATE AND FORMOTEROL FUMARATE DIHYDRATE 2 PUFF: 200; 5 AEROSOL RESPIRATORY (INHALATION) at 21:35

## 2019-07-28 RX ADMIN — METOPROLOL TARTRATE 25 MG: 25 TABLET ORAL at 21:36

## 2019-07-28 RX ADMIN — AMIODARONE HYDROCHLORIDE 100 MG: 200 TABLET ORAL at 11:46

## 2019-07-28 ASSESSMENT — PAIN SCALES - GENERAL: PAINLEVEL_OUTOF10: 0

## 2019-07-28 NOTE — FLOWSHEET NOTE
Patient known from previous admission; patient provided medical update and explained she \"has still not been home\"; patient stated she will be discharged back to the [facility]; patient anxious to get her strength back so she can go home; patient hopeful and grateful to have everything behind her; ministry of presence;     07/28/19 4250   Encounter Summary   Services provided to: Patient   Referral/Consult From: [facility]   (7/28/19)   Complexity of Encounter Moderate   Length of Encounter 15 minutes   Spiritual Assessment Completed Yes   Spiritual/Quaker   Type Spiritual support   Assessment Approachable; Hopeful;Coping   Intervention Active listening;Explored feelings, thoughts, concerns;Prayer;Sustaining presence/ Ministry of presence; Discussed illness/injury and it's impact   Outcome Comfort;Expressed gratitude;Engaged in conversation;Expressed feelings/needs/concerns;Coping; Hopeful;Receptive

## 2019-07-28 NOTE — PROGRESS NOTES
History and Physical Service  [facility]    Progress note              Date:   7/28/2019  Patient name:  Christopher Lizama  MRN:   [id number]  Account:  [de-identified]  YOB: 1950  PCP:    Fide Arenas DO  Code Status:    Full Code    Chief Complaint:     Abdominal wall pain    History Obtained From:     patient    History of Present Illness: The patient is a 68 y.o.   Unavailable/unknown female who presents   89year-old  lady with moderate to severe malnutrition protein calorie who had a laparotomy for small bowel obstruction in the month of May was discharged back to [facility] coming back with abdominal wall pain small area of wound dehiscence and pus drainage she has no fever chills nausea lack of appetite denies any diarrhea no rash        Past Medical History:     Past Medical History:   Diagnosis Date    Abnormal computed tomography of cervical spine     sclerotic bone appearance     CVA (cerebral vascular accident) ([address]     left  side weakness    GERD (gastroesophageal reflux disease)     Hypertension     Paraproteinemia     Weight loss         Past Surgical History:     Past Surgical History:   Procedure Laterality Date    ABDOMEN SURGERY N/A 5/25/2019    ABDOMEN DEBRIDEMENT WOUND CLOSURE REOPENING OF RECENT LAPOROATOMY, ABDOMINAL WASHOUT, REPAIR FASCIAL DEHISENCE WITH MESH performed by Chino Lepe DO at [facility] 5/15/2019    CHOLECYSTECTOMY performed by Chino Lepe DO at [address] [place] [address] 5/25/2019    GASTROSTOMY TUBE PLACEMENT performed by Chino Lepe DO at [facility] [address] [place] [address] N/A 7/27/2019    EGD ESOPHAGOGASTRODUODENOSCOPY WITH REMOVAL OF PEG TUBE performed by Chino Lepe DO at [address] [place] [address]  4/14/2019         LAPAROSCOPY N/A 5/15/2019    LAPAROSCOPY EXPLORATORY CONVERTED TO EXPLORATORY LAPAROTOMY/ RIGHT COLON RESECTION AND ANASTAMOSIS/ OPEN
Returned to room. Denies chest pain or dyspnea. Com[lains of rated pain at 7 at insertion site. No bleeding noted. Requesting something to drink.
(cerebral vascular accident) ([facility] GERD (gastroesophageal reflux disease), Hypertension, Paraproteinemia, and Weight loss.      Plans:     Severe protein calorie malnutrition BMI 18.3  Post laparotomy wound infection IV antibiotics may need washout surg consulted  DVT prophylaxis  Dietitian consult for severe malnutrition  c diff positive in June  If diarrhea recurs deja retest  July 27  Peg tube removal plan  Iv abx  Wound infection      Current Facility-Administered Medications   Medication Dose Route Frequency Provider Last Rate Last Dose    vancomycin (FIRVANQ) 50 MG/ML oral solution 125 mg  125 mg Oral BID Roxy Kapoor MD   125 mg at 07/26/19 2107    [START ON 7/31/2019] vancomycin LESLIE WALKER [facility]) 50 MG/ML oral solution 125 mg  125 mg Oral Daily Daysi Lara MD        enoxaparin (LOVENOX) injection 30 mg  30 mg Subcutaneous Daily Consuelo Daly MD   Stopped at 07/27/19 0900    sodium chloride flush 0.9 % injection 10 mL  10 mL Intravenous PRN Daysi Lara MD   10 mL at 07/25/19 1730    sodium chloride flush 0.9 % injection 10 mL  10 mL Intravenous 2 times per day Daysi Lara MD   10 mL at 07/27/19 0853    sodium chloride flush 0.9 % injection 10 mL  10 mL Intravenous PRN Daysi Lara MD        acetaminophen (TYLENOL) tablet 650 mg  650 mg Oral Q4H PRN Daysi Lara MD        amiodarone (CORDARONE) tablet 100 mg  100 mg Oral BID Roxy Kapoor MD   100 mg at 07/26/19 2018    mometasone-formoterol (DULERA) 200-5 MCG/ACT inhaler 2 puff  2 puff Inhalation BID Daysi Lara MD   2 puff at 07/27/19 0852    calcium carbonate-vitamin D (CALTRATE) 600-400 MG-UNIT per tab 1 tablet  1 tablet Oral Daily Daysi Lara MD        divalproex (DEPAKOTE SPRINKLE) capsule 125 mg  125 mg Oral BID Roxy Kapoor MD   125 mg at 07/26/19 2019    furosemide (LASIX) tablet 40 mg  40 mg Oral Daily Daysi Lara MD        ipratropium-albuterol (DUONEB) nebulizer solution 3 mL  1 vial Inhalation Q8H PRN

## 2019-07-29 VITALS
DIASTOLIC BLOOD PRESSURE: 47 MMHG | HEART RATE: 60 BPM | SYSTOLIC BLOOD PRESSURE: 101 MMHG | WEIGHT: 93.7 LBS | HEIGHT: 60 IN | OXYGEN SATURATION: 94 % | RESPIRATION RATE: 16 BRPM | BODY MASS INDEX: 18.4 KG/M2 | TEMPERATURE: 98.6 F

## 2019-07-29 LAB
ABSOLUTE EOS #: 0.5 K/UL (ref 0–0.4)
ABSOLUTE IMMATURE GRANULOCYTE: ABNORMAL K/UL (ref 0–0.3)
ABSOLUTE LYMPH #: 1.1 K/UL (ref 1–4.8)
ABSOLUTE MONO #: 0.7 K/UL (ref 0.1–1.3)
ANION GAP SERPL CALCULATED.3IONS-SCNC: 11 MMOL/L (ref 9–17)
BASOPHILS # BLD: 0 % (ref 0–2)
BASOPHILS ABSOLUTE: 0 K/UL (ref 0–0.2)
BUN BLDV-MCNC: 6 MG/DL (ref 8–23)
BUN/CREAT BLD: ABNORMAL (ref 9–20)
CALCIUM SERPL-MCNC: 8.4 MG/DL (ref 8.6–10.4)
CHLORIDE BLD-SCNC: 99 MMOL/L (ref 98–107)
CO2: 24 MMOL/L (ref 20–31)
CREAT SERPL-MCNC: <0.4 MG/DL (ref 0.5–0.9)
CULTURE: ABNORMAL
DIFFERENTIAL TYPE: ABNORMAL
DIRECT EXAM: ABNORMAL
DIRECT EXAM: ABNORMAL
EOSINOPHILS RELATIVE PERCENT: 6 % (ref 0–4)
GFR AFRICAN AMERICAN: ABNORMAL ML/MIN
GFR NON-AFRICAN AMERICAN: ABNORMAL ML/MIN
GFR SERPL CREATININE-BSD FRML MDRD: ABNORMAL ML/MIN/{1.73_M2}
GFR SERPL CREATININE-BSD FRML MDRD: ABNORMAL ML/MIN/{1.73_M2}
GLUCOSE BLD-MCNC: 110 MG/DL (ref 70–99)
HCT VFR BLD CALC: 33.4 % (ref 36–46)
HEMOGLOBIN: 10.9 G/DL (ref 12–16)
IMMATURE GRANULOCYTES: ABNORMAL %
LYMPHOCYTES # BLD: 13 % (ref 24–44)
Lab: ABNORMAL
MCH RBC QN AUTO: 28.4 PG (ref 26–34)
MCHC RBC AUTO-ENTMCNC: 32.8 G/DL (ref 31–37)
MCV RBC AUTO: 86.8 FL (ref 80–100)
MONOCYTES # BLD: 8 % (ref 1–7)
NRBC AUTOMATED: ABNORMAL PER 100 WBC
PDW BLD-RTO: 17 % (ref 11.5–14.9)
PLATELET # BLD: 277 K/UL (ref 150–450)
PLATELET ESTIMATE: ABNORMAL
PMV BLD AUTO: 7.8 FL (ref 6–12)
POTASSIUM SERPL-SCNC: 3.4 MMOL/L (ref 3.7–5.3)
RBC # BLD: 3.85 M/UL (ref 4–5.2)
RBC # BLD: ABNORMAL 10*6/UL
SEG NEUTROPHILS: 73 % (ref 36–66)
SEGMENTED NEUTROPHILS ABSOLUTE COUNT: 5.9 K/UL (ref 1.3–9.1)
SODIUM BLD-SCNC: 134 MMOL/L (ref 135–144)
SPECIMEN DESCRIPTION: ABNORMAL
WBC # BLD: 8.3 K/UL (ref 3.5–11)
WBC # BLD: ABNORMAL 10*3/UL

## 2019-07-29 PROCEDURE — 80048 BASIC METABOLIC PNL TOTAL CA: CPT

## 2019-07-29 PROCEDURE — 6370000000 HC RX 637 (ALT 250 FOR IP): Performed by: SURGERY

## 2019-07-29 PROCEDURE — 6360000002 HC RX W HCPCS: Performed by: INTERNAL MEDICINE

## 2019-07-29 PROCEDURE — 85025 COMPLETE CBC W/AUTO DIFF WBC: CPT

## 2019-07-29 PROCEDURE — 36415 COLL VENOUS BLD VENIPUNCTURE: CPT

## 2019-07-29 PROCEDURE — 2580000003 HC RX 258: Performed by: INTERNAL MEDICINE

## 2019-07-29 PROCEDURE — 2580000003 HC RX 258: Performed by: SURGERY

## 2019-07-29 PROCEDURE — 6360000002 HC RX W HCPCS: Performed by: SURGERY

## 2019-07-29 PROCEDURE — 99239 HOSP IP/OBS DSCHRG MGMT >30: CPT | Performed by: INTERNAL MEDICINE

## 2019-07-29 RX ORDER — CEFUROXIME AXETIL 500 MG/1
500 TABLET ORAL 2 TIMES DAILY
Qty: 20 TABLET | Refills: 0 | Status: SHIPPED | OUTPATIENT
Start: 2019-07-29 | End: 2019-08-08

## 2019-07-29 RX ORDER — LACTOBACILLUS RHAMNOSUS GG 10B CELL
1 CAPSULE ORAL
Status: DISCONTINUED | OUTPATIENT
Start: 2019-07-29 | End: 2019-07-29 | Stop reason: HOSPADM

## 2019-07-29 RX ORDER — OXYCODONE HYDROCHLORIDE AND ACETAMINOPHEN 5; 325 MG/1; MG/1
1 TABLET ORAL EVERY 6 HOURS PRN
Qty: 10 TABLET | Refills: 0 | Status: SHIPPED | OUTPATIENT
Start: 2019-07-29 | End: 2019-08-01

## 2019-07-29 RX ADMIN — LEVOTHYROXINE SODIUM 75 MCG: 75 TABLET ORAL at 06:11

## 2019-07-29 RX ADMIN — DIVALPROEX SODIUM 125 MG: 125 CAPSULE, COATED PELLETS ORAL at 08:08

## 2019-07-29 RX ADMIN — SERTRALINE HYDROCHLORIDE 50 MG: 50 TABLET ORAL at 08:08

## 2019-07-29 RX ADMIN — FUROSEMIDE 40 MG: 40 TABLET ORAL at 08:08

## 2019-07-29 RX ADMIN — MOMETASONE FUROATE AND FORMOTEROL FUMARATE DIHYDRATE 2 PUFF: 200; 5 AEROSOL RESPIRATORY (INHALATION) at 08:08

## 2019-07-29 RX ADMIN — VANCOMYCIN HYDROCHLORIDE 125 MG: KIT at 09:43

## 2019-07-29 RX ADMIN — PANTOPRAZOLE SODIUM 40 MG: 40 TABLET, DELAYED RELEASE ORAL at 06:11

## 2019-07-29 RX ADMIN — ENOXAPARIN SODIUM 30 MG: 30 INJECTION SUBCUTANEOUS at 08:08

## 2019-07-29 RX ADMIN — Medication 10 ML: at 08:11

## 2019-07-29 RX ADMIN — METOPROLOL TARTRATE 25 MG: 25 TABLET ORAL at 08:08

## 2019-07-29 RX ADMIN — ZOLPIDEM TARTRATE 5 MG: 5 TABLET ORAL at 00:22

## 2019-07-29 RX ADMIN — Medication 1 TABLET: at 08:08

## 2019-07-29 RX ADMIN — CEFEPIME 2 G: 2 INJECTION, POWDER, FOR SOLUTION INTRAVENOUS at 00:15

## 2019-07-29 RX ADMIN — AMIODARONE HYDROCHLORIDE 100 MG: 200 TABLET ORAL at 08:08

## 2019-07-29 NOTE — DISCHARGE INSTR - COC
Continuity of Care Form    Patient Name: Wilber Stallings   :  1950  MRN:  [id number]    Admit date:  2019  Discharge date:  ***    Code Status Order: Full Code   Advance Directives:   Advance Care Flowsheet Documentation     Date/Time Healthcare Directive Type of Healthcare Directive Copy in [address] Agent's Name Healthcare Agent's Phone Number    [phone]  Yes, patient has an advance directive for healthcare treatment  Durable power of  for health care  Yes, copy in chart  --  --  --          Admitting Physician:  Marko Orozco MD  PCP: Zane Schultz DO    Discharging Nurse: [facility] Unit/Room#: 2039/2039-01  Discharging Unit Phone Number: ***    Emergency Contact:   Extended Emergency Contact Information  Primary Emergency Contact: Doretha Cornelius  [place] Phone: [phone]  Relation: Brother/Sister  Preferred language: English  Secondary Emergency Contact: [facility] Phone: [phone]  Mobile Phone: [phone]  Relation: Other    Past Surgical History:  Past Surgical History:   Procedure Laterality Date    ABDOMEN SURGERY N/A 2019    ABDOMEN DEBRIDEMENT WOUND CLOSURE REOPENING OF RECENT LAPOROATOMY, ABDOMINAL WASHOUT, REPAIR FASCIAL DEHISENCE WITH MESH performed by Markus Ramirez DO at [facility] 5/15/2019    CHOLECYSTECTOMY performed by Markus Ramirez DO at [address] [place] [address] 2019    GASTROSTOMY TUBE PLACEMENT performed by Markus Ramirez DO at [address] [place] [address] N/A 2019    EGD ESOPHAGOGASTRODUODENOSCOPY WITH REMOVAL OF PEG TUBE performed by Markus Ramirez DO at [address] [place] [address]  2019         LAPAROSCOPY N/A 5/15/2019    LAPAROSCOPY EXPLORATORY CONVERTED TO EXPLORATORY LAPAROTOMY/ RIGHT COLON RESECTION AND ANASTAMOSIS/ OPEN CHOLECYSTECTOMY/ EXTENSIVE LYSIS OF ADHESIONS performed by Markus Ramirez DO at [place] [address]  2011

## 2019-07-29 NOTE — CARE COORDINATION
DISCHARGE PLANNING NOTE:    LSW following for return to [facility] on d/c. Pt is a bed hold. [address] hospice following. Had EGD on 7/27 with PEG removed. Abdominal percutaneous drain placed 7/26. Active order for IV Cefepime and Vanco as well as PO Vanco for C-Diff. Will continue to follow for additional d/c needs.     Electronically signed by Omar Peterson RN on 7/29/2019 at 9:05 AM

## 2019-08-13 ENCOUNTER — HOSPITAL ENCOUNTER (OUTPATIENT)
Dept: CT IMAGING | Age: 69
Discharge: HOME | End: 2019-08-15
Payer: COMMERCIAL

## 2019-08-13 DIAGNOSIS — S30.1XXA ABDOMINAL WALL SEROMA, INITIAL ENCOUNTER: ICD-10-CM

## 2019-08-13 PROCEDURE — 6360000004 HC RX CONTRAST MEDICATION: Performed by: SURGERY

## 2019-08-13 PROCEDURE — 74177 CT ABD & PELVIS W/CONTRAST: CPT

## 2019-08-13 PROCEDURE — 2580000003 HC RX 258: Performed by: SURGERY

## 2019-08-13 RX ORDER — SODIUM CHLORIDE 0.9 % (FLUSH) 0.9 %
10 SYRINGE (ML) INJECTION PRN
Status: DISCONTINUED | OUTPATIENT
Start: 2019-08-13 | End: 2019-08-16 | Stop reason: HOSPADM

## 2019-08-13 RX ORDER — 0.9 % SODIUM CHLORIDE 0.9 %
80 INTRAVENOUS SOLUTION INTRAVENOUS ONCE
Status: COMPLETED | OUTPATIENT
Start: 2019-08-13 | End: 2019-08-13

## 2019-08-13 RX ADMIN — IOHEXOL 50 ML: 240 INJECTION, SOLUTION INTRATHECAL; INTRAVASCULAR; INTRAVENOUS; ORAL at 12:17

## 2019-08-13 RX ADMIN — IOVERSOL 75 ML: 741 INJECTION INTRA-ARTERIAL; INTRAVENOUS at 12:16

## 2019-08-13 RX ADMIN — SODIUM CHLORIDE 80 ML: 9 INJECTION, SOLUTION INTRAVENOUS at 12:16

## 2019-08-13 RX ADMIN — Medication 10 ML: at 12:16

## 2019-08-26 LAB
CULTURE: NORMAL
Lab: NORMAL
SPECIMEN DESCRIPTION: NORMAL

## 2019-11-07 ENCOUNTER — HOSPITAL ENCOUNTER (OUTPATIENT)
Age: 69
Setting detail: SPECIMEN
Discharge: HOME | End: 2019-11-07
Payer: COMMERCIAL

## 2019-11-07 LAB
ANION GAP SERPL CALCULATED.3IONS-SCNC: 10 MMOL/L (ref 9–17)
BUN BLDV-MCNC: 23 MG/DL (ref 8–23)
BUN/CREAT BLD: 31 (ref 9–20)
CALCIUM SERPL-MCNC: 9.2 MG/DL (ref 8.6–10.4)
CHLORIDE BLD-SCNC: 100 MMOL/L (ref 98–107)
CO2: 29 MMOL/L (ref 20–31)
CREAT SERPL-MCNC: 0.74 MG/DL (ref 0.5–0.9)
GFR AFRICAN AMERICAN: >60 ML/MIN
GFR NON-AFRICAN AMERICAN: >60 ML/MIN
GFR SERPL CREATININE-BSD FRML MDRD: ABNORMAL ML/MIN/{1.73_M2}
GFR SERPL CREATININE-BSD FRML MDRD: ABNORMAL ML/MIN/{1.73_M2}
GLUCOSE BLD-MCNC: 85 MG/DL (ref 70–99)
HCT VFR BLD CALC: 35.3 % (ref 36.3–47.1)
HEMOGLOBIN: 11 G/DL (ref 11.9–15.1)
MCH RBC QN AUTO: 29.9 PG (ref 25.2–33.5)
MCHC RBC AUTO-ENTMCNC: 31.2 G/DL (ref 28.4–34.8)
MCV RBC AUTO: 95.9 FL (ref 82.6–102.9)
NRBC AUTOMATED: 0 PER 100 WBC
PDW BLD-RTO: 14.3 % (ref 11.8–14.4)
PLATELET # BLD: 177 K/UL (ref 138–453)
PMV BLD AUTO: 11.9 FL (ref 8.1–13.5)
POTASSIUM SERPL-SCNC: 4.9 MMOL/L (ref 3.7–5.3)
RBC # BLD: 3.68 M/UL (ref 3.95–5.11)
SODIUM BLD-SCNC: 139 MMOL/L (ref 135–144)
TSH SERPL DL<=0.05 MIU/L-ACNC: 12.4 MIU/L (ref 0.3–5)
WBC # BLD: 7.3 K/UL (ref 3.5–11.3)

## 2019-11-07 PROCEDURE — P9603 ONE-WAY ALLOW PRORATED MILES: HCPCS

## 2019-11-07 PROCEDURE — 80048 BASIC METABOLIC PNL TOTAL CA: CPT

## 2019-11-07 PROCEDURE — 84443 ASSAY THYROID STIM HORMONE: CPT

## 2019-11-07 PROCEDURE — 85027 COMPLETE CBC AUTOMATED: CPT

## 2019-11-07 PROCEDURE — 36415 COLL VENOUS BLD VENIPUNCTURE: CPT

## 2020-01-01 ENCOUNTER — HOSPITAL ENCOUNTER (OUTPATIENT)
Age: 70
Setting detail: SPECIMEN
Discharge: HOME | End: 2020-01-19
Payer: COMMERCIAL

## 2020-01-01 ENCOUNTER — APPOINTMENT (OUTPATIENT)
Dept: GENERAL RADIOLOGY | Age: 70
DRG: 314 | End: 2020-01-01
Payer: MEDICARE

## 2020-01-01 ENCOUNTER — HOSPITAL ENCOUNTER (OUTPATIENT)
Age: 70
Setting detail: SPECIMEN
Discharge: HOME | End: 2020-12-02
Payer: MEDICARE

## 2020-01-01 ENCOUNTER — APPOINTMENT (OUTPATIENT)
Dept: CT IMAGING | Age: 70
DRG: 314 | End: 2020-01-01
Payer: MEDICARE

## 2020-01-01 ENCOUNTER — HOSPITAL ENCOUNTER (OUTPATIENT)
Age: 70
Setting detail: SPECIMEN
Discharge: HOME | End: 2020-09-16
Payer: MEDICARE

## 2020-01-01 ENCOUNTER — HOSPITAL ENCOUNTER (OUTPATIENT)
Age: 70
Setting detail: SPECIMEN
Discharge: HOME | End: 2020-11-13
Payer: MEDICARE

## 2020-01-01 ENCOUNTER — HOSPITAL ENCOUNTER (OUTPATIENT)
Age: 70
Setting detail: SPECIMEN
Discharge: HOME | End: 2020-01-16
Payer: COMMERCIAL

## 2020-01-01 ENCOUNTER — APPOINTMENT (OUTPATIENT)
Dept: INTERVENTIONAL RADIOLOGY/VASCULAR | Age: 70
DRG: 314 | End: 2020-01-01
Payer: MEDICARE

## 2020-01-01 ENCOUNTER — HOSPITAL ENCOUNTER (INPATIENT)
Age: 70
LOS: 12 days | DRG: 314 | End: 2020-12-15
Attending: EMERGENCY MEDICINE | Admitting: INTERNAL MEDICINE
Payer: MEDICARE

## 2020-01-01 ENCOUNTER — HOSPITAL ENCOUNTER (OUTPATIENT)
Age: 70
Setting detail: SPECIMEN
Discharge: HOME | End: 2020-11-14
Payer: MEDICARE

## 2020-01-01 ENCOUNTER — ANESTHESIA (OUTPATIENT)
Dept: ICU | Age: 70
DRG: 314 | End: 2020-01-01
Payer: MEDICARE

## 2020-01-01 ENCOUNTER — HOSPITAL ENCOUNTER (OUTPATIENT)
Age: 70
Setting detail: SPECIMEN
Discharge: HOME | End: 2020-02-06
Payer: MEDICARE

## 2020-01-01 ENCOUNTER — HOSPITAL ENCOUNTER (OUTPATIENT)
Age: 70
Setting detail: SPECIMEN
Discharge: HOME | End: 2020-11-24
Payer: MEDICARE

## 2020-01-01 ENCOUNTER — HOSPITAL ENCOUNTER (OUTPATIENT)
Age: 70
Setting detail: SPECIMEN
Discharge: HOME | End: 2020-03-16
Payer: MEDICARE

## 2020-01-01 ENCOUNTER — ANESTHESIA EVENT (OUTPATIENT)
Dept: ICU | Age: 70
DRG: 314 | End: 2020-01-01
Payer: MEDICARE

## 2020-01-01 ENCOUNTER — HOSPITAL ENCOUNTER (OUTPATIENT)
Age: 70
Setting detail: SPECIMEN
Discharge: HOME | End: 2020-11-12
Payer: MEDICARE

## 2020-01-01 ENCOUNTER — HOSPITAL ENCOUNTER (OUTPATIENT)
Age: 70
Setting detail: SPECIMEN
Discharge: HOME | End: 2020-05-05
Payer: MEDICARE

## 2020-01-01 ENCOUNTER — HOSPITAL ENCOUNTER (OUTPATIENT)
Age: 70
Setting detail: SPECIMEN
Discharge: HOME | End: 2020-11-18
Payer: MEDICARE

## 2020-01-01 ENCOUNTER — HOSPITAL ENCOUNTER (OUTPATIENT)
Age: 70
Setting detail: SPECIMEN
Discharge: HOME | End: 2020-06-16
Payer: MEDICARE

## 2020-01-01 ENCOUNTER — HOSPITAL ENCOUNTER (OUTPATIENT)
Age: 70
Setting detail: SPECIMEN
Discharge: HOME | End: 2020-02-05
Payer: MEDICARE

## 2020-01-01 ENCOUNTER — TELEPHONE (OUTPATIENT)
Dept: UROLOGY | Age: 70
End: 2020-01-01

## 2020-01-01 VITALS
DIASTOLIC BLOOD PRESSURE: 28 MMHG | OXYGEN SATURATION: 94 % | HEIGHT: 60 IN | RESPIRATION RATE: 21 BRPM | SYSTOLIC BLOOD PRESSURE: 50 MMHG | HEART RATE: 95 BPM | TEMPERATURE: 95.6 F | WEIGHT: 148.6 LBS | BODY MASS INDEX: 29.17 KG/M2

## 2020-01-01 LAB
-: ABNORMAL
-: NORMAL
ABSOLUTE BANDS #: 0.27 K/UL (ref 0–1)
ABSOLUTE BANDS #: 0.36 K/UL (ref 0–1)
ABSOLUTE BANDS #: 0.4 K/UL (ref 0–1)
ABSOLUTE BANDS #: 2.7 K/UL (ref 0–1)
ABSOLUTE EOS #: 0 K/UL (ref 0–0.4)
ABSOLUTE EOS #: 0.14 K/UL (ref 0–0.4)
ABSOLUTE EOS #: 0.2 K/UL (ref 0–0.4)
ABSOLUTE EOS #: 0.2 K/UL (ref 0–0.4)
ABSOLUTE EOS #: 0.22 K/UL (ref 0–0.4)
ABSOLUTE EOS #: 0.23 K/UL (ref 0–0.44)
ABSOLUTE EOS #: 0.26 K/UL (ref 0–0.44)
ABSOLUTE EOS #: <0.03 K/UL (ref 0–0.44)
ABSOLUTE IMMATURE GRANULOCYTE: 0.03 K/UL (ref 0–0.3)
ABSOLUTE IMMATURE GRANULOCYTE: 0.09 K/UL (ref 0–0.3)
ABSOLUTE IMMATURE GRANULOCYTE: 0.19 K/UL (ref 0–0.3)
ABSOLUTE IMMATURE GRANULOCYTE: ABNORMAL K/UL (ref 0–0.3)
ABSOLUTE LYMPH #: 0.41 K/UL (ref 1–4.8)
ABSOLUTE LYMPH #: 0.72 K/UL (ref 1.1–3.7)
ABSOLUTE LYMPH #: 0.9 K/UL (ref 1–4.8)
ABSOLUTE LYMPH #: 1.19 K/UL (ref 1–4.8)
ABSOLUTE LYMPH #: 1.3 K/UL (ref 1–4.8)
ABSOLUTE LYMPH #: 1.5 K/UL (ref 1–4.8)
ABSOLUTE LYMPH #: 1.61 K/UL (ref 1.1–3.7)
ABSOLUTE LYMPH #: 1.69 K/UL (ref 1–4.8)
ABSOLUTE LYMPH #: 2.48 K/UL (ref 1.1–3.7)
ABSOLUTE LYMPH #: 2.72 K/UL (ref 1–4.8)
ABSOLUTE MONO #: 0.34 K/UL (ref 0.1–1.2)
ABSOLUTE MONO #: 0.34 K/UL (ref 0.1–1.3)
ABSOLUTE MONO #: 0.8 K/UL (ref 0.1–1.2)
ABSOLUTE MONO #: 0.9 K/UL (ref 0.1–1.3)
ABSOLUTE MONO #: 0.9 K/UL (ref 0.1–1.3)
ABSOLUTE MONO #: 1.1 K/UL (ref 0.1–1.3)
ABSOLUTE MONO #: 1.13 K/UL (ref 0.1–1.2)
ABSOLUTE MONO #: 1.99 K/UL (ref 0.1–1.3)
ABSOLUTE MONO #: 2.17 K/UL (ref 0.1–1.3)
ABSOLUTE MONO #: 2.36 K/UL (ref 0.1–1.3)
ABSOLUTE RETIC #: 0.04 M/UL (ref 0.02–0.1)
ADENOVIRUS PCR: NOT DETECTED
ALBUMIN SERPL-MCNC: 2.1 G/DL (ref 3.5–5.2)
ALBUMIN SERPL-MCNC: 2.3 G/DL (ref 3.5–5.2)
ALBUMIN SERPL-MCNC: 3.2 G/DL (ref 3.5–5.2)
ALBUMIN/GLOBULIN RATIO: ABNORMAL (ref 1–2.5)
ALBUMIN/GLOBULIN RATIO: ABNORMAL (ref 1–2.5)
ALLEN TEST: ABNORMAL
ALP BLD-CCNC: 68 U/L (ref 35–104)
ALP BLD-CCNC: 83 U/L (ref 35–104)
ALT SERPL-CCNC: 8 U/L (ref 5–33)
ALT SERPL-CCNC: <5 U/L (ref 5–33)
AMORPHOUS: ABNORMAL
AMORPHOUS: NORMAL
ANION GAP SERPL CALCULATED.3IONS-SCNC: 10 MMOL/L (ref 9–17)
ANION GAP SERPL CALCULATED.3IONS-SCNC: 12 MMOL/L (ref 9–17)
ANION GAP SERPL CALCULATED.3IONS-SCNC: 15 MMOL/L (ref 9–17)
ANION GAP SERPL CALCULATED.3IONS-SCNC: 16 MMOL/L (ref 9–17)
ANION GAP SERPL CALCULATED.3IONS-SCNC: 18 MMOL/L (ref 9–17)
ANION GAP SERPL CALCULATED.3IONS-SCNC: 27 MMOL/L (ref 9–17)
ANION GAP SERPL CALCULATED.3IONS-SCNC: 5 MMOL/L (ref 9–17)
ANION GAP SERPL CALCULATED.3IONS-SCNC: 8 MMOL/L (ref 9–17)
ANION GAP SERPL CALCULATED.3IONS-SCNC: 9 MMOL/L (ref 9–17)
AST SERPL-CCNC: 11 U/L
AST SERPL-CCNC: 23 U/L
BACTERIA: ABNORMAL
BACTERIA: NORMAL
BANDS: 2 % (ref 0–10)
BANDS: 8 % (ref 0–10)
BASOPHILS # BLD: 0 % (ref 0–2)
BASOPHILS # BLD: 1 % (ref 0–2)
BASOPHILS ABSOLUTE: 0 K/UL (ref 0–0.2)
BASOPHILS ABSOLUTE: 0.03 K/UL (ref 0–0.2)
BASOPHILS ABSOLUTE: 0.05 K/UL (ref 0–0.2)
BASOPHILS ABSOLUTE: 0.06 K/UL (ref 0–0.2)
BASOPHILS ABSOLUTE: 0.1 K/UL (ref 0–0.2)
BILIRUB SERPL-MCNC: 0.18 MG/DL (ref 0.3–1.2)
BILIRUB SERPL-MCNC: 0.31 MG/DL (ref 0.3–1.2)
BILIRUBIN DIRECT: 0.08 MG/DL
BILIRUBIN DIRECT: 0.13 MG/DL
BILIRUBIN URINE: ABNORMAL
BILIRUBIN URINE: ABNORMAL
BILIRUBIN URINE: NEGATIVE
BILIRUBIN URINE: NEGATIVE
BILIRUBIN, INDIRECT: 0.1 MG/DL (ref 0–1)
BILIRUBIN, INDIRECT: 0.18 MG/DL (ref 0–1)
BNP INTERPRETATION: ABNORMAL
BORDETELLA PARAPERTUSSIS: NOT DETECTED
BORDETELLA PERTUSSIS PCR: NOT DETECTED
BUN BLDV-MCNC: 13 MG/DL (ref 8–23)
BUN BLDV-MCNC: 15 MG/DL (ref 8–23)
BUN BLDV-MCNC: 16 MG/DL (ref 8–23)
BUN BLDV-MCNC: 17 MG/DL (ref 8–23)
BUN BLDV-MCNC: 17 MG/DL (ref 8–23)
BUN BLDV-MCNC: 20 MG/DL (ref 8–23)
BUN BLDV-MCNC: 20 MG/DL (ref 8–23)
BUN BLDV-MCNC: 21 MG/DL (ref 8–23)
BUN BLDV-MCNC: 24 MG/DL (ref 8–23)
BUN BLDV-MCNC: 24 MG/DL (ref 8–23)
BUN BLDV-MCNC: 27 MG/DL (ref 8–23)
BUN BLDV-MCNC: 4 MG/DL (ref 8–23)
BUN BLDV-MCNC: 4 MG/DL (ref 8–23)
BUN BLDV-MCNC: 5 MG/DL (ref 8–23)
BUN BLDV-MCNC: 6 MG/DL (ref 8–23)
BUN BLDV-MCNC: 8 MG/DL (ref 8–23)
BUN BLDV-MCNC: 9 MG/DL (ref 8–23)
BUN/CREAT BLD: 36 (ref 9–20)
BUN/CREAT BLD: 41 (ref 9–20)
BUN/CREAT BLD: ABNORMAL (ref 9–20)
BUN/CREAT BLD: NORMAL (ref 9–20)
C DIFFICILE TOXINS, PCR: ABNORMAL
C-REACTIVE PROTEIN: 156.6 MG/L (ref 0–5)
CALCIUM SERPL-MCNC: 6.2 MG/DL (ref 8.6–10.4)
CALCIUM SERPL-MCNC: 7.3 MG/DL (ref 8.6–10.4)
CALCIUM SERPL-MCNC: 7.7 MG/DL (ref 8.6–10.4)
CALCIUM SERPL-MCNC: 7.8 MG/DL (ref 8.6–10.4)
CALCIUM SERPL-MCNC: 7.8 MG/DL (ref 8.6–10.4)
CALCIUM SERPL-MCNC: 7.9 MG/DL (ref 8.6–10.4)
CALCIUM SERPL-MCNC: 8 MG/DL (ref 8.6–10.4)
CALCIUM SERPL-MCNC: 8 MG/DL (ref 8.6–10.4)
CALCIUM SERPL-MCNC: 8.1 MG/DL (ref 8.6–10.4)
CALCIUM SERPL-MCNC: 8.1 MG/DL (ref 8.6–10.4)
CALCIUM SERPL-MCNC: 8.2 MG/DL (ref 8.6–10.4)
CALCIUM SERPL-MCNC: 8.4 MG/DL (ref 8.6–10.4)
CALCIUM SERPL-MCNC: 8.5 MG/DL (ref 8.6–10.4)
CALCIUM SERPL-MCNC: 8.5 MG/DL (ref 8.6–10.4)
CALCIUM SERPL-MCNC: 8.6 MG/DL (ref 8.6–10.4)
CALCIUM SERPL-MCNC: 8.8 MG/DL (ref 8.6–10.4)
CALCIUM SERPL-MCNC: 8.9 MG/DL (ref 8.6–10.4)
CALCIUM SERPL-MCNC: 9 MG/DL (ref 8.6–10.4)
CALCIUM SERPL-MCNC: 9.2 MG/DL (ref 8.6–10.4)
CALCIUM SERPL-MCNC: 9.3 MG/DL (ref 8.6–10.4)
CARBOXYHEMOGLOBIN: 0.6 % (ref 0–5)
CARBOXYHEMOGLOBIN: 0.6 % (ref 0–5)
CARBOXYHEMOGLOBIN: 0.8 % (ref 0–5)
CARBOXYHEMOGLOBIN: 1 % (ref 0–5)
CARBOXYHEMOGLOBIN: 1.3 % (ref 0–5)
CARBOXYHEMOGLOBIN: 1.8 % (ref 0–5)
CASTS UA: ABNORMAL /LPF
CASTS UA: NORMAL /LPF
CHLAMYDIA PNEUMONIAE BY PCR: NOT DETECTED
CHLORIDE BLD-SCNC: 100 MMOL/L (ref 98–107)
CHLORIDE BLD-SCNC: 103 MMOL/L (ref 98–107)
CHLORIDE BLD-SCNC: 105 MMOL/L (ref 98–107)
CHLORIDE BLD-SCNC: 106 MMOL/L (ref 98–107)
CHLORIDE BLD-SCNC: 107 MMOL/L (ref 98–107)
CHLORIDE BLD-SCNC: 107 MMOL/L (ref 98–107)
CHLORIDE BLD-SCNC: 108 MMOL/L (ref 98–107)
CHLORIDE BLD-SCNC: 110 MMOL/L (ref 98–107)
CHLORIDE BLD-SCNC: 82 MMOL/L (ref 98–107)
CHLORIDE BLD-SCNC: 92 MMOL/L (ref 98–107)
CHLORIDE BLD-SCNC: 92 MMOL/L (ref 98–107)
CHLORIDE BLD-SCNC: 95 MMOL/L (ref 98–107)
CHLORIDE BLD-SCNC: 95 MMOL/L (ref 98–107)
CHLORIDE BLD-SCNC: 98 MMOL/L (ref 98–107)
CHLORIDE BLD-SCNC: 98 MMOL/L (ref 98–107)
CHLORIDE BLD-SCNC: 99 MMOL/L (ref 98–107)
CHLORIDE BLD-SCNC: 99 MMOL/L (ref 98–107)
CHOLESTEROL/HDL RATIO: 4.3
CHOLESTEROL: 128 MG/DL
CO2: 11 MMOL/L (ref 20–31)
CO2: 12 MMOL/L (ref 20–31)
CO2: 19 MMOL/L (ref 20–31)
CO2: 19 MMOL/L (ref 20–31)
CO2: 20 MMOL/L (ref 20–31)
CO2: 21 MMOL/L (ref 20–31)
CO2: 22 MMOL/L (ref 20–31)
CO2: 23 MMOL/L (ref 20–31)
CO2: 24 MMOL/L (ref 20–31)
CO2: 24 MMOL/L (ref 20–31)
CO2: 27 MMOL/L (ref 20–31)
CO2: 28 MMOL/L (ref 20–31)
CO2: 29 MMOL/L (ref 20–31)
CO2: 29 MMOL/L (ref 20–31)
CO2: 30 MMOL/L (ref 20–31)
COLOR: YELLOW
COMMENT UA: ABNORMAL
CORONAVIRUS 229E PCR: NOT DETECTED
CORONAVIRUS HKU1 PCR: NOT DETECTED
CORONAVIRUS NL63 PCR: NOT DETECTED
CORONAVIRUS OC43 PCR: NOT DETECTED
CORTISOL COLLECTION INFO: ABNORMAL
CORTISOL: 22.2 UG/DL (ref 2.7–18.4)
CREAT SERPL-MCNC: 0.48 MG/DL (ref 0.5–0.9)
CREAT SERPL-MCNC: 0.58 MG/DL (ref 0.5–0.9)
CREAT SERPL-MCNC: 0.63 MG/DL (ref 0.5–0.9)
CREAT SERPL-MCNC: 0.66 MG/DL (ref 0.5–0.9)
CREAT SERPL-MCNC: 0.68 MG/DL (ref 0.5–0.9)
CREAT SERPL-MCNC: 0.73 MG/DL (ref 0.5–0.9)
CREAT SERPL-MCNC: 0.82 MG/DL (ref 0.5–0.9)
CREAT SERPL-MCNC: 0.86 MG/DL (ref 0.5–0.9)
CREAT SERPL-MCNC: 0.89 MG/DL (ref 0.5–0.9)
CREAT SERPL-MCNC: 0.93 MG/DL (ref 0.5–0.9)
CREAT SERPL-MCNC: 0.94 MG/DL (ref 0.5–0.9)
CREAT SERPL-MCNC: 0.98 MG/DL (ref 0.5–0.9)
CREAT SERPL-MCNC: 1.02 MG/DL (ref 0.5–0.9)
CREAT SERPL-MCNC: 1.03 MG/DL (ref 0.5–0.9)
CREAT SERPL-MCNC: 1.03 MG/DL (ref 0.5–0.9)
CREAT SERPL-MCNC: 1.06 MG/DL (ref 0.5–0.9)
CREAT SERPL-MCNC: 1.17 MG/DL (ref 0.5–0.9)
CREAT SERPL-MCNC: <0.4 MG/DL (ref 0.5–0.9)
CREATININE URINE: 12.5 MG/DL (ref 28–217)
CRYSTALS, UA: ABNORMAL /HPF
CRYSTALS, UA: NORMAL /HPF
CULTURE: ABNORMAL
CULTURE: NORMAL
CULTURE: NORMAL
DATE, STOOL #1: NORMAL
DATE, STOOL #2: NORMAL
DATE, STOOL #3: NORMAL
DIFFERENTIAL TYPE: ABNORMAL
DIRECT EXAM: ABNORMAL
EKG ATRIAL RATE: 113 BPM
EKG ATRIAL RATE: 125 BPM
EKG ATRIAL RATE: 135 BPM
EKG ATRIAL RATE: 60 BPM
EKG P AXIS: 55 DEGREES
EKG P AXIS: 58 DEGREES
EKG P AXIS: 65 DEGREES
EKG P AXIS: 98 DEGREES
EKG P-R INTERVAL: 152 MS
EKG P-R INTERVAL: 162 MS
EKG P-R INTERVAL: 166 MS
EKG P-R INTERVAL: 238 MS
EKG Q-T INTERVAL: 328 MS
EKG Q-T INTERVAL: 342 MS
EKG Q-T INTERVAL: 354 MS
EKG Q-T INTERVAL: 448 MS
EKG QRS DURATION: 116 MS
EKG QRS DURATION: 116 MS
EKG QRS DURATION: 118 MS
EKG QRS DURATION: 120 MS
EKG QTC CALCULATION (BAZETT): 448 MS
EKG QTC CALCULATION (BAZETT): 473 MS
EKG QTC CALCULATION (BAZETT): 485 MS
EKG QTC CALCULATION (BAZETT): 513 MS
EKG R AXIS: -29 DEGREES
EKG R AXIS: -43 DEGREES
EKG R AXIS: -57 DEGREES
EKG R AXIS: -61 DEGREES
EKG T AXIS: 107 DEGREES
EKG T AXIS: 55 DEGREES
EKG T AXIS: 77 DEGREES
EKG T AXIS: 89 DEGREES
EKG VENTRICULAR RATE: 113 BPM
EKG VENTRICULAR RATE: 125 BPM
EKG VENTRICULAR RATE: 135 BPM
EKG VENTRICULAR RATE: 60 BPM
EOSINOPHILS RELATIVE PERCENT: 0 % (ref 0–4)
EOSINOPHILS RELATIVE PERCENT: 0 % (ref 1–4)
EOSINOPHILS RELATIVE PERCENT: 1 % (ref 0–4)
EOSINOPHILS RELATIVE PERCENT: 2 % (ref 0–4)
EOSINOPHILS RELATIVE PERCENT: 2 % (ref 1–4)
EOSINOPHILS RELATIVE PERCENT: 3 % (ref 1–4)
EPITHELIAL CELLS UA: ABNORMAL /HPF
EPITHELIAL CELLS UA: ABNORMAL /HPF
EPITHELIAL CELLS UA: ABNORMAL /HPF (ref 0–5)
EPITHELIAL CELLS UA: NORMAL /HPF (ref 0–5)
FERRITIN: 1574 UG/L (ref 13–150)
FIO2: 100
FIO2: ABNORMAL
GFR AFRICAN AMERICAN: 55 ML/MIN
GFR AFRICAN AMERICAN: >60 ML/MIN
GFR AFRICAN AMERICAN: ABNORMAL ML/MIN
GFR NON-AFRICAN AMERICAN: 45 ML/MIN
GFR NON-AFRICAN AMERICAN: 51 ML/MIN
GFR NON-AFRICAN AMERICAN: 53 ML/MIN
GFR NON-AFRICAN AMERICAN: 56 ML/MIN
GFR NON-AFRICAN AMERICAN: 59 ML/MIN
GFR NON-AFRICAN AMERICAN: 59 ML/MIN
GFR NON-AFRICAN AMERICAN: >60 ML/MIN
GFR NON-AFRICAN AMERICAN: ABNORMAL ML/MIN
GFR SERPL CREATININE-BSD FRML MDRD: ABNORMAL ML/MIN/{1.73_M2}
GFR SERPL CREATININE-BSD FRML MDRD: NORMAL ML/MIN/{1.73_M2}
GFR SERPL CREATININE-BSD FRML MDRD: NORMAL ML/MIN/{1.73_M2}
GLOBULIN: ABNORMAL G/DL (ref 1.5–3.8)
GLOBULIN: ABNORMAL G/DL (ref 1.5–3.8)
GLUCOSE BLD-MCNC: 104 MG/DL (ref 70–99)
GLUCOSE BLD-MCNC: 105 MG/DL (ref 70–99)
GLUCOSE BLD-MCNC: 110 MG/DL (ref 70–99)
GLUCOSE BLD-MCNC: 139 MG/DL (ref 70–99)
GLUCOSE BLD-MCNC: 181 MG/DL (ref 70–99)
GLUCOSE BLD-MCNC: 195 MG/DL (ref 70–99)
GLUCOSE BLD-MCNC: 65 MG/DL (ref 70–99)
GLUCOSE BLD-MCNC: 70 MG/DL (ref 70–99)
GLUCOSE BLD-MCNC: 71 MG/DL (ref 70–99)
GLUCOSE BLD-MCNC: 77 MG/DL (ref 70–99)
GLUCOSE BLD-MCNC: 78 MG/DL (ref 70–99)
GLUCOSE BLD-MCNC: 81 MG/DL (ref 70–99)
GLUCOSE BLD-MCNC: 81 MG/DL (ref 70–99)
GLUCOSE BLD-MCNC: 83 MG/DL (ref 70–99)
GLUCOSE BLD-MCNC: 84 MG/DL (ref 70–99)
GLUCOSE BLD-MCNC: 85 MG/DL (ref 70–99)
GLUCOSE BLD-MCNC: 87 MG/DL (ref 70–99)
GLUCOSE BLD-MCNC: 89 MG/DL (ref 70–99)
GLUCOSE BLD-MCNC: 91 MG/DL (ref 70–99)
GLUCOSE BLD-MCNC: 95 MG/DL (ref 70–99)
GLUCOSE URINE: NEGATIVE
HCO3 ARTERIAL: 10.4 MMOL/L (ref 22–26)
HCO3 ARTERIAL: 13.5 MMOL/L (ref 22–26)
HCO3 ARTERIAL: 15.8 MMOL/L (ref 22–26)
HCO3 ARTERIAL: 16.3 MMOL/L (ref 22–26)
HCO3 ARTERIAL: 18.3 MMOL/L (ref 22–26)
HCO3 ARTERIAL: 20.7 MMOL/L (ref 22–26)
HCT VFR BLD CALC: 20.7 % (ref 36–46)
HCT VFR BLD CALC: 22.2 % (ref 36–46)
HCT VFR BLD CALC: 23.5 % (ref 36–46)
HCT VFR BLD CALC: 23.9 % (ref 36–46)
HCT VFR BLD CALC: 24.2 % (ref 36–46)
HCT VFR BLD CALC: 24.6 % (ref 36–46)
HCT VFR BLD CALC: 25 % (ref 36–46)
HCT VFR BLD CALC: 25.3 % (ref 36–46)
HCT VFR BLD CALC: 25.3 % (ref 36–46)
HCT VFR BLD CALC: 25.4 % (ref 36–46)
HCT VFR BLD CALC: 25.8 % (ref 36–46)
HCT VFR BLD CALC: 27.8 % (ref 36–46)
HCT VFR BLD CALC: 28.4 % (ref 36–46)
HCT VFR BLD CALC: 28.4 % (ref 36–46)
HCT VFR BLD CALC: 28.9 % (ref 36–46)
HCT VFR BLD CALC: 29.5 % (ref 36–46)
HCT VFR BLD CALC: 29.5 % (ref 36–46)
HCT VFR BLD CALC: 29.8 % (ref 36.3–47.1)
HCT VFR BLD CALC: 30.1 % (ref 36–46)
HCT VFR BLD CALC: 31.7 % (ref 36–46)
HCT VFR BLD CALC: 35 % (ref 36.3–47.1)
HCT VFR BLD CALC: 35 % (ref 36.3–47.1)
HCT VFR BLD CALC: 41 % (ref 36.3–47.1)
HCT VFR BLD CALC: 44.7 % (ref 36.3–47.1)
HDLC SERPL-MCNC: 30 MG/DL
HEMOCCULT SP1 STL QL: NEGATIVE
HEMOCCULT SP2 STL QL: NORMAL
HEMOCCULT SP3 STL QL: NORMAL
HEMOGLOBIN: 10.2 G/DL (ref 12–16)
HEMOGLOBIN: 10.9 G/DL (ref 11.9–15.1)
HEMOGLOBIN: 11 G/DL (ref 11.9–15.1)
HEMOGLOBIN: 13 G/DL (ref 11.9–15.1)
HEMOGLOBIN: 14.3 G/DL (ref 11.9–15.1)
HEMOGLOBIN: 6.5 G/DL (ref 12–16)
HEMOGLOBIN: 6.8 G/DL (ref 12–16)
HEMOGLOBIN: 7.5 G/DL (ref 12–16)
HEMOGLOBIN: 7.6 G/DL (ref 12–16)
HEMOGLOBIN: 7.9 G/DL (ref 12–16)
HEMOGLOBIN: 8 G/DL (ref 12–16)
HEMOGLOBIN: 8.1 G/DL (ref 12–16)
HEMOGLOBIN: 8.1 G/DL (ref 12–16)
HEMOGLOBIN: 8.2 G/DL (ref 12–16)
HEMOGLOBIN: 8.3 G/DL (ref 12–16)
HEMOGLOBIN: 8.4 G/DL (ref 12–16)
HEMOGLOBIN: 8.8 G/DL (ref 12–16)
HEMOGLOBIN: 9.1 G/DL (ref 12–16)
HEMOGLOBIN: 9.5 G/DL (ref 11.9–15.1)
HEMOGLOBIN: 9.5 G/DL (ref 12–16)
HEMOGLOBIN: 9.6 G/DL (ref 12–16)
HEMOGLOBIN: 9.7 G/DL (ref 12–16)
HUMAN METAPNEUMOVIRUS PCR: NOT DETECTED
IMMATURE GRANULOCYTES: 0 %
IMMATURE GRANULOCYTES: 1 %
IMMATURE GRANULOCYTES: 2 %
IMMATURE GRANULOCYTES: ABNORMAL %
IMMATURE RETIC FRACT: NORMAL %
INFLUENZA A BY PCR: NOT DETECTED
INFLUENZA A H1 (2009) PCR: NORMAL
INFLUENZA A H1 PCR: NORMAL
INFLUENZA A H3 PCR: NORMAL
INFLUENZA B BY PCR: NOT DETECTED
INR BLD: 1.3
IRON SATURATION: 33 % (ref 20–55)
IRON: 32 UG/DL (ref 37–145)
KETONES, URINE: ABNORMAL
KETONES, URINE: NEGATIVE
LACTIC ACID, SEPSIS WHOLE BLOOD: ABNORMAL MMOL/L (ref 0.5–1.9)
LACTIC ACID, SEPSIS WHOLE BLOOD: ABNORMAL MMOL/L (ref 0.5–1.9)
LACTIC ACID, SEPSIS: 4.3 MMOL/L (ref 0.5–1.9)
LACTIC ACID, SEPSIS: 5.8 MMOL/L (ref 0.5–1.9)
LACTIC ACID: 1.3 MMOL/L (ref 0.5–2.2)
LACTIC ACID: 8.7 MMOL/L (ref 0.5–2.2)
LACTIC ACID: 9.4 MMOL/L (ref 0.5–2.2)
LDL CHOLESTEROL: 78 MG/DL (ref 0–130)
LEGIONELLA PNEUMOPHILIA AG, URINE: NEGATIVE
LEUKOCYTE ESTERASE, URINE: ABNORMAL
LEUKOCYTE ESTERASE, URINE: ABNORMAL
LEUKOCYTE ESTERASE, URINE: NEGATIVE
LEUKOCYTE ESTERASE, URINE: NEGATIVE
LIPASE: 11 U/L (ref 13–60)
LIPASE: 11 U/L (ref 13–60)
LV EF: 20 %
LV EF: 55 %
LVEF MODALITY: NORMAL
LVEF MODALITY: NORMAL
LYMPHOCYTES # BLD: 10 % (ref 24–44)
LYMPHOCYTES # BLD: 13 % (ref 24–43)
LYMPHOCYTES # BLD: 13 % (ref 24–44)
LYMPHOCYTES # BLD: 15 % (ref 24–44)
LYMPHOCYTES # BLD: 3 % (ref 24–44)
LYMPHOCYTES # BLD: 30 % (ref 24–43)
LYMPHOCYTES # BLD: 5 % (ref 24–44)
LYMPHOCYTES # BLD: 6 % (ref 24–43)
LYMPHOCYTES # BLD: 6 % (ref 24–44)
LYMPHOCYTES # BLD: 8 % (ref 24–44)
Lab: ABNORMAL
Lab: NORMAL
Lab: NORMAL
MAGNESIUM: 1.1 MG/DL (ref 1.6–2.6)
MAGNESIUM: 1.3 MG/DL (ref 1.6–2.6)
MAGNESIUM: 1.5 MG/DL (ref 1.6–2.6)
MAGNESIUM: 1.7 MG/DL (ref 1.6–2.6)
MAGNESIUM: 1.8 MG/DL (ref 1.6–2.6)
MAGNESIUM: 1.8 MG/DL (ref 1.6–2.6)
MAGNESIUM: 2 MG/DL (ref 1.6–2.6)
MAGNESIUM: 2.3 MG/DL (ref 1.6–2.6)
MCH RBC QN AUTO: 27.5 PG (ref 26–34)
MCH RBC QN AUTO: 28.2 PG (ref 26–34)
MCH RBC QN AUTO: 28.4 PG (ref 26–34)
MCH RBC QN AUTO: 28.7 PG (ref 26–34)
MCH RBC QN AUTO: 28.8 PG (ref 26–34)
MCH RBC QN AUTO: 28.9 PG (ref 26–34)
MCH RBC QN AUTO: 29.2 PG (ref 25.2–33.5)
MCH RBC QN AUTO: 29.3 PG (ref 25.2–33.5)
MCH RBC QN AUTO: 29.5 PG (ref 25.2–33.5)
MCH RBC QN AUTO: 29.5 PG (ref 26–34)
MCH RBC QN AUTO: 29.6 PG (ref 26–34)
MCH RBC QN AUTO: 29.8 PG (ref 26–34)
MCH RBC QN AUTO: 29.8 PG (ref 26–34)
MCH RBC QN AUTO: 29.9 PG (ref 25.2–33.5)
MCH RBC QN AUTO: 30 PG (ref 25.2–33.5)
MCH RBC QN AUTO: 30 PG (ref 26–34)
MCH RBC QN AUTO: 30 PG (ref 26–34)
MCH RBC QN AUTO: 30.4 PG (ref 26–34)
MCH RBC QN AUTO: 30.5 PG (ref 26–34)
MCHC RBC AUTO-ENTMCNC: 29.3 G/DL (ref 31–37)
MCHC RBC AUTO-ENTMCNC: 30.3 G/DL (ref 31–37)
MCHC RBC AUTO-ENTMCNC: 30.9 G/DL (ref 31–37)
MCHC RBC AUTO-ENTMCNC: 31.1 G/DL (ref 28.4–34.8)
MCHC RBC AUTO-ENTMCNC: 31.4 G/DL (ref 28.4–34.8)
MCHC RBC AUTO-ENTMCNC: 31.5 G/DL (ref 31–37)
MCHC RBC AUTO-ENTMCNC: 31.7 G/DL (ref 28.4–34.8)
MCHC RBC AUTO-ENTMCNC: 31.9 G/DL (ref 28.4–34.8)
MCHC RBC AUTO-ENTMCNC: 32 G/DL (ref 28.4–34.8)
MCHC RBC AUTO-ENTMCNC: 32.1 G/DL (ref 31–37)
MCHC RBC AUTO-ENTMCNC: 32.1 G/DL (ref 31–37)
MCHC RBC AUTO-ENTMCNC: 32.4 G/DL (ref 31–37)
MCHC RBC AUTO-ENTMCNC: 32.7 G/DL (ref 31–37)
MCHC RBC AUTO-ENTMCNC: 32.7 G/DL (ref 31–37)
MCHC RBC AUTO-ENTMCNC: 32.8 G/DL (ref 31–37)
MCHC RBC AUTO-ENTMCNC: 33 G/DL (ref 31–37)
MCHC RBC AUTO-ENTMCNC: 33.3 G/DL (ref 31–37)
MCHC RBC AUTO-ENTMCNC: 33.7 G/DL (ref 31–37)
MCHC RBC AUTO-ENTMCNC: 33.7 G/DL (ref 31–37)
MCV RBC AUTO: 88 FL (ref 80–100)
MCV RBC AUTO: 89 FL (ref 80–100)
MCV RBC AUTO: 89.4 FL (ref 80–100)
MCV RBC AUTO: 90 FL (ref 80–100)
MCV RBC AUTO: 90.3 FL (ref 80–100)
MCV RBC AUTO: 90.3 FL (ref 80–100)
MCV RBC AUTO: 90.5 FL (ref 80–100)
MCV RBC AUTO: 91 FL (ref 80–100)
MCV RBC AUTO: 91.5 FL (ref 80–100)
MCV RBC AUTO: 92 FL (ref 82.6–102.9)
MCV RBC AUTO: 92.1 FL (ref 82.6–102.9)
MCV RBC AUTO: 92.3 FL (ref 80–100)
MCV RBC AUTO: 93.2 FL (ref 80–100)
MCV RBC AUTO: 93.5 FL (ref 82.6–102.9)
MCV RBC AUTO: 93.8 FL (ref 82.6–102.9)
MCV RBC AUTO: 96.4 FL (ref 82.6–102.9)
MCV RBC AUTO: 98 FL (ref 80–100)
METAMYELOCYTES ABSOLUTE COUNT: 0.14 K/UL
METAMYELOCYTES ABSOLUTE COUNT: 0.36 K/UL
METAMYELOCYTES ABSOLUTE COUNT: 0.67 K/UL
METAMYELOCYTES: 1 %
METAMYELOCYTES: 2 %
METAMYELOCYTES: 2 %
METHEMOGLOBIN: 0.3 % (ref 0–1.9)
METHEMOGLOBIN: 0.8 % (ref 0–1.9)
METHEMOGLOBIN: 1.1 % (ref 0–1.9)
METHEMOGLOBIN: 1.3 % (ref 0–1.9)
METHEMOGLOBIN: 2.7 % (ref 0–1.9)
METHEMOGLOBIN: ABNORMAL % (ref 0–1.9)
MODE: ABNORMAL
MONOCYTES # BLD: 10 % (ref 1–7)
MONOCYTES # BLD: 10 % (ref 3–12)
MONOCYTES # BLD: 12 % (ref 1–7)
MONOCYTES # BLD: 3 % (ref 1–7)
MONOCYTES # BLD: 3 % (ref 3–12)
MONOCYTES # BLD: 7 % (ref 1–7)
MONOCYTES # BLD: 7 % (ref 1–7)
MONOCYTES # BLD: 8 % (ref 1–7)
MONOCYTES # BLD: 8 % (ref 1–7)
MONOCYTES # BLD: 9 % (ref 3–12)
MORPHOLOGY: ABNORMAL
MORPHOLOGY: NORMAL
MORPHOLOGY: NORMAL
MRSA, DNA, NASAL: ABNORMAL
MUCUS: ABNORMAL
MUCUS: NORMAL
MYCOPLASMA PNEUMONIAE IGM: 1.22
MYCOPLASMA PNEUMONIAE PCR: NOT DETECTED
MYELOCYTES ABSOLUTE COUNT: 0.18 K/UL
MYELOCYTES ABSOLUTE COUNT: 0.22 K/UL
MYELOCYTES ABSOLUTE COUNT: 0.67 K/UL
MYELOCYTES ABSOLUTE COUNT: 0.82 K/UL
MYELOCYTES: 1 %
MYELOCYTES: 2 %
MYELOCYTES: 2 %
MYELOCYTES: 6 %
NEGATIVE BASE EXCESS, ART: 13 MMOL/L (ref 0–2)
NEGATIVE BASE EXCESS, ART: 13.1 MMOL/L (ref 0–2)
NEGATIVE BASE EXCESS, ART: 21.4 MMOL/L (ref 0–2)
NEGATIVE BASE EXCESS, ART: 4.9 MMOL/L (ref 0–2)
NEGATIVE BASE EXCESS, ART: 6.7 MMOL/L (ref 0–2)
NEGATIVE BASE EXCESS, ART: 9 MMOL/L (ref 0–2)
NITRITE, URINE: NEGATIVE
NOTIFICATION TIME: ABNORMAL
NOTIFICATION: ABNORMAL
NRBC AUTOMATED: 0 PER 100 WBC
NRBC AUTOMATED: ABNORMAL PER 100 WBC
O2 DEVICE/FLOW/%: ABNORMAL
O2 SAT, ARTERIAL: 53.8 % (ref 95–98)
O2 SAT, ARTERIAL: 77.4 % (ref 95–98)
O2 SAT, ARTERIAL: 81.2 % (ref 95–98)
O2 SAT, ARTERIAL: 92.1 % (ref 95–98)
O2 SAT, ARTERIAL: 97.5 % (ref 95–98)
O2 SAT, ARTERIAL: 98.1 % (ref 95–98)
OSMOLALITY URINE: 323 MOSM/KG (ref 80–1300)
OTHER OBSERVATIONS UA: ABNORMAL
OTHER OBSERVATIONS UA: NORMAL
OXYHEMOGLOBIN: ABNORMAL % (ref 95–98)
PARAINFLUENZA 1 PCR: NOT DETECTED
PARAINFLUENZA 2 PCR: NOT DETECTED
PARAINFLUENZA 3 PCR: NOT DETECTED
PARAINFLUENZA 4 PCR: NOT DETECTED
PARTIAL THROMBOPLASTIN TIME: 34 SEC (ref 24–36)
PATIENT TEMP: 37
PATIENT TEMP: ABNORMAL
PCO2 ARTERIAL: 27.8 MMHG (ref 35–45)
PCO2 ARTERIAL: 28.3 MMHG (ref 35–45)
PCO2 ARTERIAL: 30.2 MMHG (ref 35–45)
PCO2 ARTERIAL: 37.4 MMHG (ref 35–45)
PCO2 ARTERIAL: 44.9 MMHG (ref 35–45)
PCO2 ARTERIAL: 45.8 MMHG (ref 35–45)
PCO2, ART, TEMP ADJ: ABNORMAL (ref 35–45)
PDW BLD-RTO: 12.7 % (ref 11.8–14.4)
PDW BLD-RTO: 13 % (ref 11.8–14.4)
PDW BLD-RTO: 13.4 % (ref 11.8–14.4)
PDW BLD-RTO: 13.7 % (ref 11.8–14.4)
PDW BLD-RTO: 14.4 % (ref 11.8–14.4)
PDW BLD-RTO: 14.7 % (ref 11.5–14.9)
PDW BLD-RTO: 14.8 % (ref 11.5–14.9)
PDW BLD-RTO: 14.9 % (ref 11.5–14.9)
PDW BLD-RTO: 15.1 % (ref 11.5–14.9)
PDW BLD-RTO: 15.3 % (ref 11.5–14.9)
PDW BLD-RTO: 15.4 % (ref 11.5–14.9)
PDW BLD-RTO: 15.4 % (ref 11.5–14.9)
PDW BLD-RTO: 15.5 % (ref 11.5–14.9)
PDW BLD-RTO: 15.7 % (ref 11.5–14.9)
PDW BLD-RTO: 15.8 % (ref 11.5–14.9)
PDW BLD-RTO: 16.6 % (ref 11.5–14.9)
PDW BLD-RTO: 16.9 % (ref 11.5–14.9)
PDW BLD-RTO: 17.1 % (ref 11.5–14.9)
PDW BLD-RTO: 19 % (ref 11.5–14.9)
PEEP/CPAP: 10
PEEP/CPAP: 14
PEEP/CPAP: 8
PEEP/CPAP: ABNORMAL
PH ARTERIAL: 6.97 (ref 7.35–7.45)
PH ARTERIAL: 7.15 (ref 7.35–7.45)
PH ARTERIAL: 7.29 (ref 7.35–7.45)
PH ARTERIAL: 7.35 (ref 7.35–7.45)
PH ARTERIAL: 7.37 (ref 7.35–7.45)
PH ARTERIAL: 7.39 (ref 7.35–7.45)
PH UA: 5 (ref 5–8)
PH UA: 5 (ref 5–8)
PH UA: 6 (ref 5–8)
PH UA: 7 (ref 5–8)
PH, ART, TEMP ADJ: ABNORMAL (ref 7.35–7.45)
PHOSPHORUS: 1.3 MG/DL (ref 2.6–4.5)
PHOSPHORUS: 3.8 MG/DL (ref 2.6–4.5)
PHOSPHORUS: 5.5 MG/DL (ref 2.6–4.5)
PLATELET # BLD: 176 K/UL (ref 138–453)
PLATELET # BLD: 197 K/UL (ref 138–453)
PLATELET # BLD: 200 K/UL (ref 138–453)
PLATELET # BLD: 223 K/UL (ref 138–453)
PLATELET # BLD: 235 K/UL (ref 150–450)
PLATELET # BLD: 243 K/UL (ref 150–450)
PLATELET # BLD: 249 K/UL (ref 150–450)
PLATELET # BLD: 251 K/UL (ref 150–450)
PLATELET # BLD: 258 K/UL (ref 150–450)
PLATELET # BLD: 275 K/UL (ref 150–450)
PLATELET # BLD: 288 K/UL (ref 150–450)
PLATELET # BLD: 294 K/UL (ref 138–453)
PLATELET # BLD: 299 K/UL (ref 150–450)
PLATELET # BLD: 357 K/UL (ref 150–450)
PLATELET # BLD: 358 K/UL (ref 150–450)
PLATELET # BLD: 363 K/UL (ref 150–450)
PLATELET # BLD: 390 K/UL (ref 150–450)
PLATELET # BLD: 394 K/UL (ref 150–450)
PLATELET # BLD: 442 K/UL (ref 150–450)
PLATELET ESTIMATE: ABNORMAL
PMV BLD AUTO: 11.5 FL (ref 8.1–13.5)
PMV BLD AUTO: 11.9 FL (ref 8.1–13.5)
PMV BLD AUTO: 12 FL (ref 8.1–13.5)
PMV BLD AUTO: 12.1 FL (ref 8.1–13.5)
PMV BLD AUTO: 12.8 FL (ref 8.1–13.5)
PMV BLD AUTO: 7.2 FL (ref 6–12)
PMV BLD AUTO: 7.3 FL (ref 6–12)
PMV BLD AUTO: 7.4 FL (ref 6–12)
PMV BLD AUTO: 7.4 FL (ref 6–12)
PMV BLD AUTO: 7.5 FL (ref 6–12)
PMV BLD AUTO: 7.5 FL (ref 6–12)
PMV BLD AUTO: 7.6 FL (ref 6–12)
PMV BLD AUTO: 7.7 FL (ref 6–12)
PMV BLD AUTO: 7.7 FL (ref 6–12)
PMV BLD AUTO: 7.8 FL (ref 6–12)
PMV BLD AUTO: 7.9 FL (ref 6–12)
PMV BLD AUTO: 7.9 FL (ref 6–12)
PMV BLD AUTO: 8.2 FL (ref 6–12)
PMV BLD AUTO: 8.4 FL (ref 6–12)
PO2 ARTERIAL: 122 MMHG (ref 80–100)
PO2 ARTERIAL: 236 MMHG (ref 80–100)
PO2 ARTERIAL: 26.9 MMHG (ref 80–100)
PO2 ARTERIAL: 50.3 MMHG (ref 80–100)
PO2 ARTERIAL: 52.5 MMHG (ref 80–100)
PO2 ARTERIAL: 93.2 MMHG (ref 80–100)
PO2, ART, TEMP ADJ: ABNORMAL MMHG (ref 80–100)
POSITIVE BASE EXCESS, ART: ABNORMAL MMOL/L (ref 0–2)
POTASSIUM SERPL-SCNC: 2.9 MMOL/L (ref 3.7–5.3)
POTASSIUM SERPL-SCNC: 3.1 MMOL/L (ref 3.7–5.3)
POTASSIUM SERPL-SCNC: 3.2 MMOL/L (ref 3.7–5.3)
POTASSIUM SERPL-SCNC: 3.5 MMOL/L (ref 3.7–5.3)
POTASSIUM SERPL-SCNC: 3.6 MMOL/L (ref 3.7–5.3)
POTASSIUM SERPL-SCNC: 3.6 MMOL/L (ref 3.7–5.3)
POTASSIUM SERPL-SCNC: 3.7 MMOL/L (ref 3.7–5.3)
POTASSIUM SERPL-SCNC: 3.7 MMOL/L (ref 3.7–5.3)
POTASSIUM SERPL-SCNC: 3.8 MMOL/L (ref 3.7–5.3)
POTASSIUM SERPL-SCNC: 3.8 MMOL/L (ref 3.7–5.3)
POTASSIUM SERPL-SCNC: 3.9 MMOL/L (ref 3.7–5.3)
POTASSIUM SERPL-SCNC: 3.9 MMOL/L (ref 3.7–5.3)
POTASSIUM SERPL-SCNC: 4 MMOL/L (ref 3.7–5.3)
POTASSIUM SERPL-SCNC: 4 MMOL/L (ref 3.7–5.3)
POTASSIUM SERPL-SCNC: 4.2 MMOL/L (ref 3.7–5.3)
POTASSIUM SERPL-SCNC: 4.2 MMOL/L (ref 3.7–5.3)
POTASSIUM SERPL-SCNC: 4.3 MMOL/L (ref 3.7–5.3)
POTASSIUM SERPL-SCNC: 4.5 MMOL/L (ref 3.7–5.3)
POTASSIUM SERPL-SCNC: 4.7 MMOL/L (ref 3.7–5.3)
POTASSIUM SERPL-SCNC: 5.2 MMOL/L (ref 3.7–5.3)
PRO-BNP: 980 PG/ML
PRO-BNP: ABNORMAL PG/ML
PRO-BNP: ABNORMAL PG/ML
PROCALCITONIN: 0.08 NG/ML
PROCALCITONIN: 0.51 NG/ML
PROCALCITONIN: 0.95 NG/ML
PROTEIN UA: ABNORMAL
PROTEIN UA: NEGATIVE
PROTHROMBIN TIME: 15.8 SEC (ref 11.8–14.6)
PSV: ABNORMAL
PT. POSITION: ABNORMAL
RBC # BLD: 2.27 M/UL (ref 4–5.2)
RBC # BLD: 2.35 M/UL (ref 4–5.2)
RBC # BLD: 2.66 M/UL (ref 4–5.2)
RBC # BLD: 2.67 M/UL (ref 4–5.2)
RBC # BLD: 2.77 M/UL (ref 4–5.2)
RBC # BLD: 2.77 M/UL (ref 4–5.2)
RBC # BLD: 2.78 M/UL (ref 4–5.2)
RBC # BLD: 2.78 M/UL (ref 4–5.2)
RBC # BLD: 3.08 M/UL (ref 4–5.2)
RBC # BLD: 3.13 M/UL (ref 4–5.2)
RBC # BLD: 3.16 M/UL (ref 4–5.2)
RBC # BLD: 3.23 M/UL (ref 4–5.2)
RBC # BLD: 3.24 M/UL (ref 3.95–5.11)
RBC # BLD: 3.24 M/UL (ref 4–5.2)
RBC # BLD: 3.43 M/UL (ref 4–5.2)
RBC # BLD: 3.63 M/UL (ref 3.95–5.11)
RBC # BLD: 3.73 M/UL (ref 3.95–5.11)
RBC # BLD: 4.45 M/UL (ref 3.95–5.11)
RBC # BLD: 4.78 M/UL (ref 3.95–5.11)
RBC # BLD: ABNORMAL 10*6/UL
RBC UA: ABNORMAL /HPF
RBC UA: ABNORMAL /HPF
RBC UA: ABNORMAL /HPF (ref 0–2)
RBC UA: NORMAL /HPF (ref 0–2)
REASON FOR REJECTION: NORMAL
REASON FOR REJECTION: NORMAL
RENAL EPITHELIAL, UA: ABNORMAL /HPF
RENAL EPITHELIAL, UA: NORMAL /HPF
RESP SYNCYTIAL VIRUS PCR: NOT DETECTED
RESPIRATORY RATE: 18
RESPIRATORY RATE: 20
RESPIRATORY RATE: 25
RESPIRATORY RATE: 26
RESPIRATORY RATE: 26
RESPIRATORY RATE: 34
RETIC %: 1.5 % (ref 0.5–2)
RETIC HEMOGLOBIN: NORMAL PG (ref 28.2–35.7)
RHINO/ENTEROVIRUS PCR: NOT DETECTED
SAMPLE SITE: ABNORMAL
SARS-COV-2, PCR: NOT DETECTED
SARS-COV-2, RAPID: NOT DETECTED
SARS-COV-2: NORMAL
SARS-COV-2: NORMAL
SEG NEUTROPHILS: 56 % (ref 36–65)
SEG NEUTROPHILS: 68 % (ref 36–66)
SEG NEUTROPHILS: 73 % (ref 36–65)
SEG NEUTROPHILS: 76 % (ref 36–66)
SEG NEUTROPHILS: 77 % (ref 36–66)
SEG NEUTROPHILS: 79 % (ref 36–66)
SEG NEUTROPHILS: 80 % (ref 36–66)
SEG NEUTROPHILS: 82 % (ref 36–66)
SEG NEUTROPHILS: 85 % (ref 36–66)
SEG NEUTROPHILS: 90 % (ref 36–65)
SEGMENTED NEUTROPHILS ABSOLUTE COUNT: 10 K/UL (ref 1.3–9.1)
SEGMENTED NEUTROPHILS ABSOLUTE COUNT: 10.82 K/UL (ref 1.3–9.1)
SEGMENTED NEUTROPHILS ABSOLUTE COUNT: 11.07 K/UL (ref 1.5–8.1)
SEGMENTED NEUTROPHILS ABSOLUTE COUNT: 12.31 K/UL (ref 1.3–9.1)
SEGMENTED NEUTROPHILS ABSOLUTE COUNT: 16.32 K/UL (ref 1.3–9.1)
SEGMENTED NEUTROPHILS ABSOLUTE COUNT: 25.61 K/UL (ref 1.3–9.1)
SEGMENTED NEUTROPHILS ABSOLUTE COUNT: 4.74 K/UL (ref 1.5–8.1)
SEGMENTED NEUTROPHILS ABSOLUTE COUNT: 8.9 K/UL (ref 1.3–9.1)
SEGMENTED NEUTROPHILS ABSOLUTE COUNT: 9.38 K/UL (ref 1.5–8.1)
SEGMENTED NEUTROPHILS ABSOLUTE COUNT: 9.52 K/UL (ref 1.3–9.1)
SET RATE: 18
SET RATE: 20
SET RATE: 24
SET RATE: ABNORMAL
SODIUM BLD-SCNC: 121 MMOL/L (ref 135–144)
SODIUM BLD-SCNC: 130 MMOL/L (ref 135–144)
SODIUM BLD-SCNC: 131 MMOL/L (ref 135–144)
SODIUM BLD-SCNC: 132 MMOL/L (ref 135–144)
SODIUM BLD-SCNC: 133 MMOL/L (ref 135–144)
SODIUM BLD-SCNC: 135 MMOL/L (ref 135–144)
SODIUM BLD-SCNC: 136 MMOL/L (ref 135–144)
SODIUM BLD-SCNC: 137 MMOL/L (ref 135–144)
SODIUM BLD-SCNC: 138 MMOL/L (ref 135–144)
SODIUM BLD-SCNC: 138 MMOL/L (ref 135–144)
SODIUM BLD-SCNC: 139 MMOL/L (ref 135–144)
SODIUM BLD-SCNC: 139 MMOL/L (ref 135–144)
SODIUM BLD-SCNC: 140 MMOL/L (ref 135–144)
SODIUM BLD-SCNC: 142 MMOL/L (ref 135–144)
SODIUM,UR: 115 MMOL/L
SOURCE: NORMAL
SPECIFIC GRAVITY UA: 1.01 (ref 1–1.03)
SPECIFIC GRAVITY UA: 1.02 (ref 1–1.03)
SPECIFIC GRAVITY UA: 1.02 (ref 1–1.03)
SPECIFIC GRAVITY UA: 1.03 (ref 1–1.03)
SPECIMEN DESCRIPTION: ABNORMAL
SPECIMEN DESCRIPTION: NORMAL
TEXT FOR RESPIRATORY: ABNORMAL
TIME, STOOL #1: 1157
TIME, STOOL #2: NORMAL
TIME, STOOL #3: NORMAL
TOTAL HB: ABNORMAL G/DL (ref 12–16)
TOTAL IRON BINDING CAPACITY: 97 UG/DL (ref 250–450)
TOTAL PROTEIN: 5.1 G/DL (ref 6.4–8.3)
TOTAL PROTEIN: 5.8 G/DL (ref 6.4–8.3)
TOTAL RATE: 25
TOTAL RATE: 27
TOTAL RATE: 34
TOTAL RATE: ABNORMAL
TRICHOMONAS: ABNORMAL
TRICHOMONAS: NORMAL
TRIGL SERPL-MCNC: 99 MG/DL
TROPONIN INTERP: ABNORMAL
TROPONIN T: ABNORMAL NG/ML
TROPONIN, HIGH SENSITIVITY: 26 NG/L (ref 0–14)
TROPONIN, HIGH SENSITIVITY: 27 NG/L (ref 0–14)
TROPONIN, HIGH SENSITIVITY: 44 NG/L (ref 0–14)
TROPONIN, HIGH SENSITIVITY: 47 NG/L (ref 0–14)
TROPONIN, HIGH SENSITIVITY: 48 NG/L (ref 0–14)
TROPONIN, HIGH SENSITIVITY: 48 NG/L (ref 0–14)
TROPONIN, HIGH SENSITIVITY: 52 NG/L (ref 0–14)
TROPONIN, HIGH SENSITIVITY: 61 NG/L (ref 0–14)
TSH SERPL DL<=0.05 MIU/L-ACNC: 0.93 MIU/L (ref 0.3–5)
TSH SERPL DL<=0.05 MIU/L-ACNC: 1.36 MIU/L (ref 0.3–5)
TSH SERPL DL<=0.05 MIU/L-ACNC: 1.53 MIU/L (ref 0.3–5)
TSH SERPL DL<=0.05 MIU/L-ACNC: 2.06 MIU/L (ref 0.3–5)
TSH SERPL DL<=0.05 MIU/L-ACNC: 8.08 MIU/L (ref 0.3–5)
TURBIDITY: ABNORMAL
TURBIDITY: ABNORMAL
TURBIDITY: CLEAR
TURBIDITY: CLEAR
UNSATURATED IRON BINDING CAPACITY: 65 UG/DL (ref 112–347)
UREA NITROGEN, UR: 86 MG/DL
URIC ACID: 4.5 MG/DL (ref 2.4–5.7)
URINE HGB: ABNORMAL
URINE HGB: ABNORMAL
URINE HGB: NEGATIVE
URINE HGB: NEGATIVE
UROBILINOGEN, URINE: NORMAL
VANCOMYCIN TROUGH DATE LAST DOSE: ABNORMAL
VANCOMYCIN TROUGH DATE LAST DOSE: NORMAL
VANCOMYCIN TROUGH DOSE AMOUNT: 1000
VANCOMYCIN TROUGH DOSE AMOUNT: 1000
VANCOMYCIN TROUGH TIME LAST DOSE: 21
VANCOMYCIN TROUGH TIME LAST DOSE: 530
VANCOMYCIN TROUGH: 11.6 UG/ML (ref 10–20)
VANCOMYCIN TROUGH: 7.9 UG/ML (ref 10–20)
VLDLC SERPL CALC-MCNC: ABNORMAL MG/DL (ref 1–30)
VT: 400
VT: ABNORMAL
WBC # BLD: 10 K/UL (ref 3.5–11.3)
WBC # BLD: 11.2 K/UL (ref 3.5–11)
WBC # BLD: 11.5 K/UL (ref 3.5–11)
WBC # BLD: 11.7 K/UL (ref 3.5–11)
WBC # BLD: 12.2 K/UL (ref 3.5–11.3)
WBC # BLD: 12.5 K/UL (ref 3.5–11)
WBC # BLD: 12.6 K/UL (ref 3.5–11.3)
WBC # BLD: 13 K/UL (ref 3.5–11)
WBC # BLD: 13.6 K/UL (ref 3.5–11)
WBC # BLD: 13.7 K/UL (ref 3.5–11)
WBC # BLD: 14.3 K/UL (ref 3.5–11.3)
WBC # BLD: 18.1 K/UL (ref 3.5–11)
WBC # BLD: 19.9 K/UL (ref 3.5–11)
WBC # BLD: 22.1 K/UL (ref 3.5–11)
WBC # BLD: 27.1 K/UL (ref 3.5–11)
WBC # BLD: 33.7 K/UL (ref 3.5–11)
WBC # BLD: 42.9 K/UL (ref 3.5–11)
WBC # BLD: 44.2 K/UL (ref 3.5–11)
WBC # BLD: 8.4 K/UL (ref 3.5–11.3)
WBC # BLD: ABNORMAL 10*3/UL
WBC UA: ABNORMAL /HPF
WBC UA: ABNORMAL /HPF
WBC UA: ABNORMAL /HPF (ref 0–5)
WBC UA: NORMAL /HPF (ref 0–5)
YEAST: ABNORMAL
YEAST: NORMAL
ZZ NTE CLEAN UP: ORDERED TEST: NORMAL
ZZ NTE CLEAN UP: ORDERED TEST: NORMAL
ZZ NTE WITH NAME CLEAN UP: SPECIMEN SOURCE: NORMAL
ZZ NTE WITH NAME CLEAN UP: SPECIMEN SOURCE: NORMAL

## 2020-01-01 PROCEDURE — 2060000000 HC ICU INTERMEDIATE R&B

## 2020-01-01 PROCEDURE — 82805 BLOOD GASES W/O2 SATURATION: CPT

## 2020-01-01 PROCEDURE — 2580000003 HC RX 258: Performed by: NURSE PRACTITIONER

## 2020-01-01 PROCEDURE — 6370000000 HC RX 637 (ALT 250 FOR IP): Performed by: NURSE PRACTITIONER

## 2020-01-01 PROCEDURE — 81001 URINALYSIS AUTO W/SCOPE: CPT

## 2020-01-01 PROCEDURE — 2580000003 HC RX 258: Performed by: INTERNAL MEDICINE

## 2020-01-01 PROCEDURE — 2000000000 HC ICU R&B

## 2020-01-01 PROCEDURE — 85027 COMPLETE CBC AUTOMATED: CPT

## 2020-01-01 PROCEDURE — 99233 SBSQ HOSP IP/OBS HIGH 50: CPT | Performed by: INTERNAL MEDICINE

## 2020-01-01 PROCEDURE — 99232 SBSQ HOSP IP/OBS MODERATE 35: CPT | Performed by: INTERNAL MEDICINE

## 2020-01-01 PROCEDURE — 6370000000 HC RX 637 (ALT 250 FOR IP): Performed by: STUDENT IN AN ORGANIZED HEALTH CARE EDUCATION/TRAINING PROGRAM

## 2020-01-01 PROCEDURE — 83880 ASSAY OF NATRIURETIC PEPTIDE: CPT

## 2020-01-01 PROCEDURE — 80048 BASIC METABOLIC PNL TOTAL CA: CPT

## 2020-01-01 PROCEDURE — 86403 PARTICLE AGGLUT ANTBDY SCRN: CPT

## 2020-01-01 PROCEDURE — 71045 X-RAY EXAM CHEST 1 VIEW: CPT

## 2020-01-01 PROCEDURE — 85014 HEMATOCRIT: CPT

## 2020-01-01 PROCEDURE — 94761 N-INVAS EAR/PLS OXIMETRY MLT: CPT

## 2020-01-01 PROCEDURE — 86900 BLOOD TYPING SEROLOGIC ABO: CPT

## 2020-01-01 PROCEDURE — 6360000002 HC RX W HCPCS: Performed by: STUDENT IN AN ORGANIZED HEALTH CARE EDUCATION/TRAINING PROGRAM

## 2020-01-01 PROCEDURE — 92611 MOTION FLUOROSCOPY/SWALLOW: CPT

## 2020-01-01 PROCEDURE — 82728 ASSAY OF FERRITIN: CPT

## 2020-01-01 PROCEDURE — 93010 ELECTROCARDIOGRAM REPORT: CPT | Performed by: INTERNAL MEDICINE

## 2020-01-01 PROCEDURE — 6360000002 HC RX W HCPCS: Performed by: INTERNAL MEDICINE

## 2020-01-01 PROCEDURE — 92526 ORAL FUNCTION THERAPY: CPT

## 2020-01-01 PROCEDURE — 93306 TTE W/DOPPLER COMPLETE: CPT

## 2020-01-01 PROCEDURE — 2500000003 HC RX 250 WO HCPCS: Performed by: INTERNAL MEDICINE

## 2020-01-01 PROCEDURE — 6360000002 HC RX W HCPCS: Performed by: NURSE PRACTITIONER

## 2020-01-01 PROCEDURE — 71250 CT THORAX DX C-: CPT

## 2020-01-01 PROCEDURE — 84443 ASSAY THYROID STIM HORMONE: CPT

## 2020-01-01 PROCEDURE — 51798 US URINE CAPACITY MEASURE: CPT

## 2020-01-01 PROCEDURE — 36415 COLL VENOUS BLD VENIPUNCTURE: CPT

## 2020-01-01 PROCEDURE — 84540 ASSAY OF URINE/UREA-N: CPT

## 2020-01-01 PROCEDURE — 2500000003 HC RX 250 WO HCPCS: Performed by: EMERGENCY MEDICINE

## 2020-01-01 PROCEDURE — 99222 1ST HOSP IP/OBS MODERATE 55: CPT | Performed by: INTERNAL MEDICINE

## 2020-01-01 PROCEDURE — 85025 COMPLETE CBC W/AUTO DIFF WBC: CPT

## 2020-01-01 PROCEDURE — 83935 ASSAY OF URINE OSMOLALITY: CPT

## 2020-01-01 PROCEDURE — 87449 NOS EACH ORGANISM AG IA: CPT

## 2020-01-01 PROCEDURE — 87086 URINE CULTURE/COLONY COUNT: CPT

## 2020-01-01 PROCEDURE — 2580000003 HC RX 258: Performed by: STUDENT IN AN ORGANIZED HEALTH CARE EDUCATION/TRAINING PROGRAM

## 2020-01-01 PROCEDURE — 6370000000 HC RX 637 (ALT 250 FOR IP): Performed by: INTERNAL MEDICINE

## 2020-01-01 PROCEDURE — 84550 ASSAY OF BLOOD/URIC ACID: CPT

## 2020-01-01 PROCEDURE — 85018 HEMOGLOBIN: CPT

## 2020-01-01 PROCEDURE — 99232 SBSQ HOSP IP/OBS MODERATE 35: CPT | Performed by: NURSE PRACTITIONER

## 2020-01-01 PROCEDURE — 2500000003 HC RX 250 WO HCPCS: Performed by: STUDENT IN AN ORGANIZED HEALTH CARE EDUCATION/TRAINING PROGRAM

## 2020-01-01 PROCEDURE — 51702 INSERT TEMP BLADDER CATH: CPT

## 2020-01-01 PROCEDURE — 74230 X-RAY XM SWLNG FUNCJ C+: CPT

## 2020-01-01 PROCEDURE — 84145 PROCALCITONIN (PCT): CPT

## 2020-01-01 PROCEDURE — P9603 ONE-WAY ALLOW PRORATED MILES: HCPCS

## 2020-01-01 PROCEDURE — 97162 PT EVAL MOD COMPLEX 30 MIN: CPT

## 2020-01-01 PROCEDURE — 87186 SC STD MICRODIL/AGAR DIL: CPT

## 2020-01-01 PROCEDURE — 36620 INSERTION CATHETER ARTERY: CPT | Performed by: ANESTHESIOLOGY

## 2020-01-01 PROCEDURE — 36556 INSERT NON-TUNNEL CV CATH: CPT

## 2020-01-01 PROCEDURE — 83735 ASSAY OF MAGNESIUM: CPT

## 2020-01-01 PROCEDURE — 96365 THER/PROPH/DIAG IV INF INIT: CPT

## 2020-01-01 PROCEDURE — 99291 CRITICAL CARE FIRST HOUR: CPT | Performed by: INTERNAL MEDICINE

## 2020-01-01 PROCEDURE — 36600 WITHDRAWAL OF ARTERIAL BLOOD: CPT

## 2020-01-01 PROCEDURE — 83605 ASSAY OF LACTIC ACID: CPT

## 2020-01-01 PROCEDURE — 87205 SMEAR GRAM STAIN: CPT

## 2020-01-01 PROCEDURE — 2700000000 HC OXYGEN THERAPY PER DAY

## 2020-01-01 PROCEDURE — 2580000003 HC RX 258: Performed by: EMERGENCY MEDICINE

## 2020-01-01 PROCEDURE — 84484 ASSAY OF TROPONIN QUANT: CPT

## 2020-01-01 PROCEDURE — P9047 ALBUMIN (HUMAN), 25%, 50ML: HCPCS | Performed by: NURSE PRACTITIONER

## 2020-01-01 PROCEDURE — 36430 TRANSFUSION BLD/BLD COMPNT: CPT

## 2020-01-01 PROCEDURE — 86850 RBC ANTIBODY SCREEN: CPT

## 2020-01-01 PROCEDURE — 6360000002 HC RX W HCPCS: Performed by: EMERGENCY MEDICINE

## 2020-01-01 PROCEDURE — 97165 OT EVAL LOW COMPLEX 30 MIN: CPT

## 2020-01-01 PROCEDURE — C9113 INJ PANTOPRAZOLE SODIUM, VIA: HCPCS | Performed by: INTERNAL MEDICINE

## 2020-01-01 PROCEDURE — 74177 CT ABD & PELVIS W/CONTRAST: CPT

## 2020-01-01 PROCEDURE — 83550 IRON BINDING TEST: CPT

## 2020-01-01 PROCEDURE — 94664 DEMO&/EVAL PT USE INHALER: CPT

## 2020-01-01 PROCEDURE — 87077 CULTURE AEROBIC IDENTIFY: CPT

## 2020-01-01 PROCEDURE — 82533 TOTAL CORTISOL: CPT

## 2020-01-01 PROCEDURE — 93005 ELECTROCARDIOGRAM TRACING: CPT | Performed by: NURSE PRACTITIONER

## 2020-01-01 PROCEDURE — 87493 C DIFF AMPLIFIED PROBE: CPT

## 2020-01-01 PROCEDURE — 87150 DNA/RNA AMPLIFIED PROBE: CPT

## 2020-01-01 PROCEDURE — 93005 ELECTROCARDIOGRAM TRACING: CPT | Performed by: EMERGENCY MEDICINE

## 2020-01-01 PROCEDURE — 86140 C-REACTIVE PROTEIN: CPT

## 2020-01-01 PROCEDURE — 94640 AIRWAY INHALATION TREATMENT: CPT

## 2020-01-01 PROCEDURE — 80061 LIPID PANEL: CPT

## 2020-01-01 PROCEDURE — 94660 CPAP INITIATION&MGMT: CPT

## 2020-01-01 PROCEDURE — 99223 1ST HOSP IP/OBS HIGH 75: CPT | Performed by: INTERNAL MEDICINE

## 2020-01-01 PROCEDURE — 86901 BLOOD TYPING SEROLOGIC RH(D): CPT

## 2020-01-01 PROCEDURE — 86920 COMPATIBILITY TEST SPIN: CPT

## 2020-01-01 PROCEDURE — B5181ZA FLUOROSCOPY OF SUPERIOR VENA CAVA USING LOW OSMOLAR CONTRAST, GUIDANCE: ICD-10-PCS | Performed by: INTERNAL MEDICINE

## 2020-01-01 PROCEDURE — 85610 PROTHROMBIN TIME: CPT

## 2020-01-01 PROCEDURE — 31720 CLEARANCE OF AIRWAYS: CPT

## 2020-01-01 PROCEDURE — 96375 TX/PRO/DX INJ NEW DRUG ADDON: CPT

## 2020-01-01 PROCEDURE — 97110 THERAPEUTIC EXERCISES: CPT

## 2020-01-01 PROCEDURE — 83690 ASSAY OF LIPASE: CPT

## 2020-01-01 PROCEDURE — 84132 ASSAY OF SERUM POTASSIUM: CPT

## 2020-01-01 PROCEDURE — 94770 HC ETCO2 MONITOR DAILY: CPT

## 2020-01-01 PROCEDURE — 93005 ELECTROCARDIOGRAM TRACING: CPT | Performed by: STUDENT IN AN ORGANIZED HEALTH CARE EDUCATION/TRAINING PROGRAM

## 2020-01-01 PROCEDURE — 87040 BLOOD CULTURE FOR BACTERIA: CPT

## 2020-01-01 PROCEDURE — 02HV33Z INSERTION OF INFUSION DEVICE INTO SUPERIOR VENA CAVA, PERCUTANEOUS APPROACH: ICD-10-PCS | Performed by: EMERGENCY MEDICINE

## 2020-01-01 PROCEDURE — G0328 FECAL BLOOD SCRN IMMUNOASSAY: HCPCS

## 2020-01-01 PROCEDURE — 80202 ASSAY OF VANCOMYCIN: CPT

## 2020-01-01 PROCEDURE — 85730 THROMBOPLASTIN TIME PARTIAL: CPT

## 2020-01-01 PROCEDURE — U0002 COVID-19 LAB TEST NON-CDC: HCPCS

## 2020-01-01 PROCEDURE — 87070 CULTURE OTHR SPECIMN AEROBIC: CPT

## 2020-01-01 PROCEDURE — 99285 EMERGENCY DEPT VISIT HI MDM: CPT

## 2020-01-01 PROCEDURE — 84300 ASSAY OF URINE SODIUM: CPT

## 2020-01-01 PROCEDURE — 36573 INSJ PICC RS&I 5 YR+: CPT | Performed by: RADIOLOGY

## 2020-01-01 PROCEDURE — 80076 HEPATIC FUNCTION PANEL: CPT

## 2020-01-01 PROCEDURE — 73620 X-RAY EXAM OF FOOT: CPT

## 2020-01-01 PROCEDURE — 0202U NFCT DS 22 TRGT SARS-COV-2: CPT

## 2020-01-01 PROCEDURE — 94002 VENT MGMT INPAT INIT DAY: CPT

## 2020-01-01 PROCEDURE — 84100 ASSAY OF PHOSPHORUS: CPT

## 2020-01-01 PROCEDURE — 87641 MR-STAPH DNA AMP PROBE: CPT

## 2020-01-01 PROCEDURE — 83540 ASSAY OF IRON: CPT

## 2020-01-01 PROCEDURE — 2709999900 IR FLUORO GUIDED CVA DEVICE PLMT/REPLACE/REMOVAL

## 2020-01-01 PROCEDURE — 82570 ASSAY OF URINE CREATININE: CPT

## 2020-01-01 PROCEDURE — 73600 X-RAY EXAM OF ANKLE: CPT

## 2020-01-01 PROCEDURE — 74022 RADEX COMPL AQT ABD SERIES: CPT

## 2020-01-01 PROCEDURE — 94003 VENT MGMT INPAT SUBQ DAY: CPT

## 2020-01-01 PROCEDURE — 31500 INSERT EMERGENCY AIRWAY: CPT

## 2020-01-01 PROCEDURE — 87088 URINE BACTERIA CULTURE: CPT

## 2020-01-01 PROCEDURE — 6360000004 HC RX CONTRAST MEDICATION: Performed by: EMERGENCY MEDICINE

## 2020-01-01 PROCEDURE — 02HV33Z INSERTION OF INFUSION DEVICE INTO SUPERIOR VENA CAVA, PERCUTANEOUS APPROACH: ICD-10-PCS | Performed by: INTERNAL MEDICINE

## 2020-01-01 PROCEDURE — 86738 MYCOPLASMA ANTIBODY: CPT

## 2020-01-01 PROCEDURE — P9016 RBC LEUKOCYTES REDUCED: HCPCS

## 2020-01-01 PROCEDURE — 85045 AUTOMATED RETICULOCYTE COUNT: CPT

## 2020-01-01 PROCEDURE — 2500000003 HC RX 250 WO HCPCS: Performed by: ANESTHESIOLOGY

## 2020-01-01 PROCEDURE — 80069 RENAL FUNCTION PANEL: CPT

## 2020-01-01 PROCEDURE — 5A1945Z RESPIRATORY VENTILATION, 24-96 CONSECUTIVE HOURS: ICD-10-PCS | Performed by: INTERNAL MEDICINE

## 2020-01-01 RX ORDER — MORPHINE SULFATE 2 MG/ML
2 INJECTION, SOLUTION INTRAMUSCULAR; INTRAVENOUS
Status: DISCONTINUED | OUTPATIENT
Start: 2020-01-01 | End: 2020-01-01 | Stop reason: HOSPADM

## 2020-01-01 RX ORDER — MIRTAZAPINE 15 MG/1
7.5 TABLET, FILM COATED ORAL NIGHTLY
Status: DISCONTINUED | OUTPATIENT
Start: 2020-01-01 | End: 2020-01-01

## 2020-01-01 RX ORDER — MAGNESIUM SULFATE 1 G/100ML
1 INJECTION INTRAVENOUS PRN
Status: DISCONTINUED | OUTPATIENT
Start: 2020-01-01 | End: 2020-01-01 | Stop reason: HOSPADM

## 2020-01-01 RX ORDER — TRAZODONE HYDROCHLORIDE 100 MG/1
100 TABLET ORAL NIGHTLY
Status: DISCONTINUED | OUTPATIENT
Start: 2020-01-01 | End: 2020-01-01

## 2020-01-01 RX ORDER — SODIUM CHLORIDE, SODIUM LACTATE, POTASSIUM CHLORIDE, CALCIUM CHLORIDE 600; 310; 30; 20 MG/100ML; MG/100ML; MG/100ML; MG/100ML
INJECTION, SOLUTION INTRAVENOUS CONTINUOUS
Status: DISCONTINUED | OUTPATIENT
Start: 2020-01-01 | End: 2020-01-01 | Stop reason: SDUPTHER

## 2020-01-01 RX ORDER — SUCCINYLCHOLINE/SOD CL,ISO/PF 200MG/10ML
SYRINGE (ML) INTRAVENOUS
Status: COMPLETED | OUTPATIENT
Start: 2020-01-01 | End: 2020-01-01

## 2020-01-01 RX ORDER — HYDROCODONE BITARTRATE AND ACETAMINOPHEN 5; 325 MG/1; MG/1
1 TABLET ORAL EVERY 6 HOURS PRN
COMMUNITY

## 2020-01-01 RX ORDER — PREDNISONE 20 MG/1
20 TABLET ORAL DAILY
Status: DISCONTINUED | OUTPATIENT
Start: 2020-01-01 | End: 2020-01-01

## 2020-01-01 RX ORDER — 0.9 % SODIUM CHLORIDE 0.9 %
500 INTRAVENOUS SOLUTION INTRAVENOUS ONCE
Status: COMPLETED | OUTPATIENT
Start: 2020-01-01 | End: 2020-01-01

## 2020-01-01 RX ORDER — SODIUM CHLORIDE 0.9 % (FLUSH) 0.9 %
10 SYRINGE (ML) INJECTION PRN
Status: DISCONTINUED | OUTPATIENT
Start: 2020-01-01 | End: 2020-01-01 | Stop reason: HOSPADM

## 2020-01-01 RX ORDER — FENTANYL CITRATE 50 UG/ML
25 INJECTION, SOLUTION INTRAMUSCULAR; INTRAVENOUS ONCE
Status: COMPLETED | OUTPATIENT
Start: 2020-01-01 | End: 2020-01-01

## 2020-01-01 RX ORDER — 0.9 % SODIUM CHLORIDE 0.9 %
20 INTRAVENOUS SOLUTION INTRAVENOUS ONCE
Status: COMPLETED | OUTPATIENT
Start: 2020-01-01 | End: 2020-01-01

## 2020-01-01 RX ORDER — BACLOFEN 10 MG/1
10 TABLET ORAL 2 TIMES DAILY
COMMUNITY

## 2020-01-01 RX ORDER — MORPHINE SULFATE 2 MG/ML
2 INJECTION, SOLUTION INTRAMUSCULAR; INTRAVENOUS EVERY 4 HOURS PRN
Status: DISCONTINUED | OUTPATIENT
Start: 2020-01-01 | End: 2020-01-01 | Stop reason: HOSPADM

## 2020-01-01 RX ORDER — 0.9 % SODIUM CHLORIDE 0.9 %
80 INTRAVENOUS SOLUTION INTRAVENOUS ONCE
Status: COMPLETED | OUTPATIENT
Start: 2020-01-01 | End: 2020-01-01

## 2020-01-01 RX ORDER — LORAZEPAM 2 MG/ML
2 INJECTION INTRAMUSCULAR
Status: DISCONTINUED | OUTPATIENT
Start: 2020-01-01 | End: 2020-01-01 | Stop reason: HOSPADM

## 2020-01-01 RX ORDER — 0.9 % SODIUM CHLORIDE 0.9 %
20 INTRAVENOUS SOLUTION INTRAVENOUS ONCE
Status: DISCONTINUED | OUTPATIENT
Start: 2020-01-01 | End: 2020-01-01 | Stop reason: HOSPADM

## 2020-01-01 RX ORDER — POTASSIUM CHLORIDE 20 MEQ/1
40 TABLET, EXTENDED RELEASE ORAL 2 TIMES DAILY WITH MEALS
Status: DISCONTINUED | OUTPATIENT
Start: 2020-01-01 | End: 2020-01-01

## 2020-01-01 RX ORDER — POTASSIUM CHLORIDE 7.45 MG/ML
10 INJECTION INTRAVENOUS PRN
Status: DISCONTINUED | OUTPATIENT
Start: 2020-01-01 | End: 2020-01-01 | Stop reason: HOSPADM

## 2020-01-01 RX ORDER — BUDESONIDE AND FORMOTEROL FUMARATE DIHYDRATE 160; 4.5 UG/1; UG/1
2 AEROSOL RESPIRATORY (INHALATION) 2 TIMES DAILY
Status: DISCONTINUED | OUTPATIENT
Start: 2020-01-01 | End: 2020-01-01

## 2020-01-01 RX ORDER — NOREPINEPHRINE BIT/0.9 % NACL 16MG/250ML
2 INFUSION BOTTLE (ML) INTRAVENOUS CONTINUOUS
Status: DISCONTINUED | OUTPATIENT
Start: 2020-01-01 | End: 2020-01-01 | Stop reason: CLARIF

## 2020-01-01 RX ORDER — FUROSEMIDE 10 MG/ML
20 INJECTION INTRAMUSCULAR; INTRAVENOUS ONCE
Status: COMPLETED | OUTPATIENT
Start: 2020-01-01 | End: 2020-01-01

## 2020-01-01 RX ORDER — METOPROLOL TARTRATE 50 MG/1
50 TABLET, FILM COATED ORAL 2 TIMES DAILY
Status: DISCONTINUED | OUTPATIENT
Start: 2020-01-01 | End: 2020-01-01

## 2020-01-01 RX ORDER — SODIUM CHLORIDE 0.9 % (FLUSH) 0.9 %
10 SYRINGE (ML) INJECTION EVERY 12 HOURS SCHEDULED
Status: DISCONTINUED | OUTPATIENT
Start: 2020-01-01 | End: 2020-01-01 | Stop reason: HOSPADM

## 2020-01-01 RX ORDER — DOXYCYCLINE 100 MG/1
100 CAPSULE ORAL EVERY 12 HOURS SCHEDULED
Status: DISCONTINUED | OUTPATIENT
Start: 2020-01-01 | End: 2020-01-01

## 2020-01-01 RX ORDER — SODIUM CHLORIDE 9 MG/ML
INJECTION, SOLUTION INTRAVENOUS CONTINUOUS
Status: DISCONTINUED | OUTPATIENT
Start: 2020-01-01 | End: 2020-01-01

## 2020-01-01 RX ORDER — POTASSIUM CHLORIDE 20 MEQ/1
40 TABLET, EXTENDED RELEASE ORAL PRN
Status: DISCONTINUED | OUTPATIENT
Start: 2020-01-01 | End: 2020-01-01 | Stop reason: HOSPADM

## 2020-01-01 RX ORDER — TRAZODONE HYDROCHLORIDE 50 MG/1
50 TABLET ORAL NIGHTLY
Status: DISCONTINUED | OUTPATIENT
Start: 2020-01-01 | End: 2020-01-01

## 2020-01-01 RX ORDER — ETOMIDATE 2 MG/ML
INJECTION INTRAVENOUS
Status: COMPLETED | OUTPATIENT
Start: 2020-01-01 | End: 2020-01-01

## 2020-01-01 RX ORDER — IPRATROPIUM BROMIDE AND ALBUTEROL SULFATE 2.5; .5 MG/3ML; MG/3ML
1 SOLUTION RESPIRATORY (INHALATION) EVERY 8 HOURS PRN
Status: DISCONTINUED | OUTPATIENT
Start: 2020-01-01 | End: 2020-01-01 | Stop reason: HOSPADM

## 2020-01-01 RX ORDER — DEXTROMETHORPHAN HYDROBROMIDE AND QUINIDINE SULFATE 20; 10 MG/1; MG/1
1 CAPSULE, GELATIN COATED ORAL EVERY 12 HOURS
COMMUNITY

## 2020-01-01 RX ORDER — ACETAMINOPHEN 325 MG/1
650 TABLET ORAL EVERY 6 HOURS PRN
Status: DISCONTINUED | OUTPATIENT
Start: 2020-01-01 | End: 2020-01-01 | Stop reason: HOSPADM

## 2020-01-01 RX ORDER — BACLOFEN 10 MG/1
10 TABLET ORAL 2 TIMES DAILY
Status: DISCONTINUED | OUTPATIENT
Start: 2020-01-01 | End: 2020-01-01

## 2020-01-01 RX ORDER — MEGESTROL ACETATE 40 MG/1
40 TABLET ORAL DAILY
Status: DISCONTINUED | OUTPATIENT
Start: 2020-01-01 | End: 2020-01-01

## 2020-01-01 RX ORDER — DOXYCYCLINE HYCLATE 100 MG
100 TABLET ORAL 2 TIMES DAILY
COMMUNITY
Start: 2020-01-01 | End: 2020-01-01

## 2020-01-01 RX ORDER — ACETAMINOPHEN 650 MG/1
650 SUPPOSITORY RECTAL EVERY 6 HOURS PRN
Status: DISCONTINUED | OUTPATIENT
Start: 2020-01-01 | End: 2020-01-01 | Stop reason: HOSPADM

## 2020-01-01 RX ORDER — POLYETHYLENE GLYCOL 3350 17 G/17G
17 POWDER, FOR SOLUTION ORAL DAILY PRN
Status: DISCONTINUED | OUTPATIENT
Start: 2020-01-01 | End: 2020-01-01 | Stop reason: HOSPADM

## 2020-01-01 RX ORDER — 0.9 % SODIUM CHLORIDE 0.9 %
250 INTRAVENOUS SOLUTION INTRAVENOUS ONCE
Status: COMPLETED | OUTPATIENT
Start: 2020-01-01 | End: 2020-01-01

## 2020-01-01 RX ORDER — DOXYCYCLINE HYCLATE 100 MG
100 TABLET ORAL 2 TIMES DAILY
Qty: 42 TABLET | Refills: 0 | OUTPATIENT
Start: 2020-01-01 | End: 2020-12-29

## 2020-01-01 RX ORDER — DEXMEDETOMIDINE HYDROCHLORIDE 4 UG/ML
0.8 INJECTION, SOLUTION INTRAVENOUS CONTINUOUS
Status: DISCONTINUED | OUTPATIENT
Start: 2020-01-01 | End: 2020-01-01

## 2020-01-01 RX ORDER — 0.9 % SODIUM CHLORIDE 0.9 %
1000 INTRAVENOUS SOLUTION INTRAVENOUS ONCE
Status: COMPLETED | OUTPATIENT
Start: 2020-01-01 | End: 2020-01-01

## 2020-01-01 RX ORDER — HYDROCODONE BITARTRATE AND ACETAMINOPHEN 5; 325 MG/1; MG/1
1 TABLET ORAL EVERY 6 HOURS PRN
Status: DISCONTINUED | OUTPATIENT
Start: 2020-01-01 | End: 2020-01-01

## 2020-01-01 RX ORDER — LEVOFLOXACIN 5 MG/ML
750 INJECTION, SOLUTION INTRAVENOUS EVERY 24 HOURS
Status: DISCONTINUED | OUTPATIENT
Start: 2020-01-01 | End: 2020-01-01

## 2020-01-01 RX ORDER — ALBUMIN (HUMAN) 12.5 G/50ML
25 SOLUTION INTRAVENOUS EVERY 12 HOURS
Status: DISCONTINUED | OUTPATIENT
Start: 2020-01-01 | End: 2020-01-01

## 2020-01-01 RX ORDER — CHLORHEXIDINE GLUCONATE 0.12 MG/ML
15 RINSE ORAL 2 TIMES DAILY
Status: DISCONTINUED | OUTPATIENT
Start: 2020-01-01 | End: 2020-01-01 | Stop reason: HOSPADM

## 2020-01-01 RX ORDER — 0.9 % SODIUM CHLORIDE 0.9 %
500 INTRAVENOUS SOLUTION INTRAVENOUS ONCE
Status: DISCONTINUED | OUTPATIENT
Start: 2020-01-01 | End: 2020-01-01

## 2020-01-01 RX ORDER — MEGESTROL ACETATE 40 MG/ML
200 SUSPENSION ORAL DAILY
Status: DISCONTINUED | OUTPATIENT
Start: 2020-01-01 | End: 2020-01-01

## 2020-01-01 RX ORDER — PROMETHAZINE HYDROCHLORIDE 25 MG/1
12.5 TABLET ORAL EVERY 6 HOURS PRN
Status: DISCONTINUED | OUTPATIENT
Start: 2020-01-01 | End: 2020-01-01 | Stop reason: HOSPADM

## 2020-01-01 RX ORDER — FUROSEMIDE 10 MG/ML
20 INJECTION INTRAMUSCULAR; INTRAVENOUS DAILY
Status: DISCONTINUED | OUTPATIENT
Start: 2020-01-01 | End: 2020-01-01

## 2020-01-01 RX ORDER — CEFAZOLIN SODIUM 1 G/3ML
2 INJECTION, POWDER, FOR SOLUTION INTRAMUSCULAR; INTRAVENOUS EVERY 8 HOURS
Qty: 228000 MG | Refills: 0
Start: 2020-01-01 | End: 2021-01-14

## 2020-01-01 RX ORDER — LEVOTHYROXINE SODIUM 0.12 MG/1
125 TABLET ORAL DAILY
Qty: 30 TABLET | Refills: 3 | OUTPATIENT
Start: 2020-01-01

## 2020-01-01 RX ORDER — ONDANSETRON 2 MG/ML
4 INJECTION INTRAMUSCULAR; INTRAVENOUS EVERY 6 HOURS PRN
Status: DISCONTINUED | OUTPATIENT
Start: 2020-01-01 | End: 2020-01-01 | Stop reason: HOSPADM

## 2020-01-01 RX ORDER — TRAZODONE HYDROCHLORIDE 100 MG/1
100 TABLET ORAL NIGHTLY
COMMUNITY

## 2020-01-01 RX ORDER — LEVOTHYROXINE SODIUM 0.12 MG/1
125 TABLET ORAL DAILY
Status: DISCONTINUED | OUTPATIENT
Start: 2020-01-01 | End: 2020-01-01

## 2020-01-01 RX ORDER — PANTOPRAZOLE SODIUM 40 MG/10ML
40 INJECTION, POWDER, LYOPHILIZED, FOR SOLUTION INTRAVENOUS DAILY
Status: DISCONTINUED | OUTPATIENT
Start: 2020-01-01 | End: 2020-01-01

## 2020-01-01 RX ORDER — FERROUS SULFATE 325(65) MG
325 TABLET ORAL 2 TIMES DAILY WITH MEALS
Status: DISCONTINUED | OUTPATIENT
Start: 2020-01-01 | End: 2020-01-01

## 2020-01-01 RX ORDER — 0.9 % SODIUM CHLORIDE 0.9 %
500 INTRAVENOUS SOLUTION INTRAVENOUS ONCE
Status: DISCONTINUED | OUTPATIENT
Start: 2020-01-01 | End: 2020-01-01 | Stop reason: HOSPADM

## 2020-01-01 RX ORDER — NOREPINEPHRINE BIT/0.9 % NACL 16MG/250ML
2 INFUSION BOTTLE (ML) INTRAVENOUS CONTINUOUS
Status: DISCONTINUED | OUTPATIENT
Start: 2020-01-01 | End: 2020-01-01 | Stop reason: HOSPADM

## 2020-01-01 RX ORDER — HYDROCODONE BITARTRATE AND ACETAMINOPHEN 5; 325 MG/1; MG/1
1 TABLET ORAL EVERY 6 HOURS PRN
Status: DISCONTINUED | OUTPATIENT
Start: 2020-01-01 | End: 2020-01-01 | Stop reason: HOSPADM

## 2020-01-01 RX ORDER — SODIUM CHLORIDE 9 MG/ML
10 INJECTION INTRAVENOUS DAILY
Status: DISCONTINUED | OUTPATIENT
Start: 2020-01-01 | End: 2020-01-01

## 2020-01-01 RX ORDER — DIVALPROEX SODIUM 125 MG/1
125 CAPSULE, COATED PELLETS ORAL 2 TIMES DAILY
Status: DISCONTINUED | OUTPATIENT
Start: 2020-01-01 | End: 2020-01-01

## 2020-01-01 RX ADMIN — SODIUM CHLORIDE: 9 INJECTION, SOLUTION INTRAVENOUS at 04:16

## 2020-01-01 RX ADMIN — Medication 10 ML: at 09:06

## 2020-01-01 RX ADMIN — TRAZODONE HYDROCHLORIDE 100 MG: 100 TABLET ORAL at 20:42

## 2020-01-01 RX ADMIN — BUDESONIDE AND FORMOTEROL FUMARATE DIHYDRATE 2 PUFF: 160; 4.5 AEROSOL RESPIRATORY (INHALATION) at 08:29

## 2020-01-01 RX ADMIN — PHENYLEPHRINE HYDROCHLORIDE 200 MCG/MIN: 10 INJECTION INTRAVENOUS at 01:00

## 2020-01-01 RX ADMIN — HYDROCORTISONE SODIUM SUCCINATE 100 MG: 100 INJECTION, POWDER, FOR SOLUTION INTRAMUSCULAR; INTRAVENOUS at 09:59

## 2020-01-01 RX ADMIN — SERTRALINE HYDROCHLORIDE 50 MG: 50 TABLET ORAL at 08:46

## 2020-01-01 RX ADMIN — HYDROCORTISONE SODIUM SUCCINATE 100 MG: 100 INJECTION, POWDER, FOR SOLUTION INTRAMUSCULAR; INTRAVENOUS at 20:13

## 2020-01-01 RX ADMIN — BACLOFEN 10 MG: 10 TABLET ORAL at 08:59

## 2020-01-01 RX ADMIN — LEVOTHYROXINE SODIUM 125 MCG: 125 TABLET ORAL at 10:17

## 2020-01-01 RX ADMIN — SODIUM CHLORIDE: 9 INJECTION, SOLUTION INTRAVENOUS at 15:28

## 2020-01-01 RX ADMIN — LEVOTHYROXINE SODIUM 125 MCG: 125 TABLET ORAL at 06:04

## 2020-01-01 RX ADMIN — TRAZODONE HYDROCHLORIDE 50 MG: 50 TABLET ORAL at 20:37

## 2020-01-01 RX ADMIN — SODIUM BICARBONATE: 84 INJECTION, SOLUTION INTRAVENOUS at 04:00

## 2020-01-01 RX ADMIN — VASOPRESSIN 0.02 UNITS/MIN: 20 INJECTION INTRAVENOUS at 17:45

## 2020-01-01 RX ADMIN — SODIUM CHLORIDE 1000 ML: 9 INJECTION, SOLUTION INTRAVENOUS at 01:51

## 2020-01-01 RX ADMIN — BUDESONIDE AND FORMOTEROL FUMARATE DIHYDRATE 2 PUFF: 160; 4.5 AEROSOL RESPIRATORY (INHALATION) at 20:47

## 2020-01-01 RX ADMIN — Medication 10 ML: at 08:29

## 2020-01-01 RX ADMIN — POTASSIUM CHLORIDE 40 MEQ: 1500 TABLET, EXTENDED RELEASE ORAL at 11:07

## 2020-01-01 RX ADMIN — CEFAZOLIN 2 G: 10 INJECTION, POWDER, FOR SOLUTION INTRAVENOUS at 00:54

## 2020-01-01 RX ADMIN — PHENYLEPHRINE HYDROCHLORIDE 50 MCG/MIN: 10 INJECTION INTRAVENOUS at 19:39

## 2020-01-01 RX ADMIN — DIVALPROEX SODIUM 125 MG: 125 CAPSULE ORAL at 23:16

## 2020-01-01 RX ADMIN — BACLOFEN 10 MG: 10 TABLET ORAL at 10:16

## 2020-01-01 RX ADMIN — POTASSIUM CHLORIDE 10 MEQ: 7.46 INJECTION, SOLUTION INTRAVENOUS at 10:08

## 2020-01-01 RX ADMIN — PREDNISONE 20 MG: 20 TABLET ORAL at 11:21

## 2020-01-01 RX ADMIN — DIVALPROEX SODIUM 125 MG: 125 CAPSULE ORAL at 09:00

## 2020-01-01 RX ADMIN — METOPROLOL TARTRATE 50 MG: 50 TABLET, FILM COATED ORAL at 21:08

## 2020-01-01 RX ADMIN — PHENYLEPHRINE HYDROCHLORIDE 200 MCG/MIN: 10 INJECTION INTRAVENOUS at 09:21

## 2020-01-01 RX ADMIN — CEFAZOLIN 2 G: 10 INJECTION, POWDER, FOR SOLUTION INTRAVENOUS at 08:59

## 2020-01-01 RX ADMIN — BACLOFEN 10 MG: 10 TABLET ORAL at 09:28

## 2020-01-01 RX ADMIN — LEVOTHYROXINE SODIUM 125 MCG: 125 TABLET ORAL at 11:41

## 2020-01-01 RX ADMIN — BACLOFEN 10 MG: 10 TABLET ORAL at 20:21

## 2020-01-01 RX ADMIN — METRONIDAZOLE 500 MG: 500 INJECTION, SOLUTION INTRAVENOUS at 03:50

## 2020-01-01 RX ADMIN — PREDNISONE 20 MG: 20 TABLET ORAL at 09:25

## 2020-01-01 RX ADMIN — CEFAZOLIN 2 G: 10 INJECTION, POWDER, FOR SOLUTION INTRAVENOUS at 18:02

## 2020-01-01 RX ADMIN — LEVOFLOXACIN 750 MG: 5 INJECTION, SOLUTION INTRAVENOUS at 16:24

## 2020-01-01 RX ADMIN — SODIUM CHLORIDE 10 ML: 9 INJECTION INTRAMUSCULAR; INTRAVENOUS; SUBCUTANEOUS at 09:59

## 2020-01-01 RX ADMIN — CHLORHEXIDINE GLUCONATE 15 ML: 1.2 RINSE ORAL at 06:37

## 2020-01-01 RX ADMIN — SODIUM BICARBONATE: 84 INJECTION, SOLUTION INTRAVENOUS at 09:59

## 2020-01-01 RX ADMIN — DIVALPROEX SODIUM 125 MG: 125 CAPSULE ORAL at 20:21

## 2020-01-01 RX ADMIN — CEFAZOLIN 2 G: 10 INJECTION, POWDER, FOR SOLUTION INTRAVENOUS at 17:39

## 2020-01-01 RX ADMIN — DIVALPROEX SODIUM 125 MG: 125 CAPSULE ORAL at 19:41

## 2020-01-01 RX ADMIN — POTASSIUM CHLORIDE 10 MEQ: 7.46 INJECTION, SOLUTION INTRAVENOUS at 12:12

## 2020-01-01 RX ADMIN — TRAZODONE HYDROCHLORIDE 50 MG: 50 TABLET ORAL at 20:20

## 2020-01-01 RX ADMIN — HYDROCODONE BITARTRATE AND ACETAMINOPHEN 1 TABLET: 5; 325 TABLET ORAL at 21:34

## 2020-01-01 RX ADMIN — POTASSIUM CHLORIDE 40 MEQ: 1500 TABLET, EXTENDED RELEASE ORAL at 11:49

## 2020-01-01 RX ADMIN — MIRTAZAPINE 7.5 MG: 15 TABLET, FILM COATED ORAL at 21:09

## 2020-01-01 RX ADMIN — BUDESONIDE AND FORMOTEROL FUMARATE DIHYDRATE 2 PUFF: 160; 4.5 AEROSOL RESPIRATORY (INHALATION) at 10:55

## 2020-01-01 RX ADMIN — CEFEPIME HYDROCHLORIDE 2 G: 2 INJECTION, POWDER, FOR SOLUTION INTRAVENOUS at 14:31

## 2020-01-01 RX ADMIN — DIVALPROEX SODIUM 125 MG: 125 CAPSULE ORAL at 08:28

## 2020-01-01 RX ADMIN — SODIUM CHLORIDE 500 ML: 9 INJECTION, SOLUTION INTRAVENOUS at 11:00

## 2020-01-01 RX ADMIN — BACLOFEN 10 MG: 10 TABLET ORAL at 09:25

## 2020-01-01 RX ADMIN — SODIUM CHLORIDE 500 ML: 9 INJECTION, SOLUTION INTRAVENOUS at 05:22

## 2020-01-01 RX ADMIN — MAGNESIUM SULFATE HEPTAHYDRATE 1 G: 1 INJECTION, SOLUTION INTRAVENOUS at 14:44

## 2020-01-01 RX ADMIN — ENOXAPARIN SODIUM 40 MG: 40 INJECTION SUBCUTANEOUS at 15:27

## 2020-01-01 RX ADMIN — SODIUM CHLORIDE 250 ML: 9 INJECTION, SOLUTION INTRAVENOUS at 22:58

## 2020-01-01 RX ADMIN — ACETAMINOPHEN 650 MG: 325 TABLET, FILM COATED ORAL at 23:45

## 2020-01-01 RX ADMIN — LEVOTHYROXINE SODIUM 125 MCG: 125 TABLET ORAL at 05:37

## 2020-01-01 RX ADMIN — BACLOFEN 10 MG: 10 TABLET ORAL at 09:29

## 2020-01-01 RX ADMIN — PHENYLEPHRINE HYDROCHLORIDE 200 MCG/MIN: 10 INJECTION INTRAVENOUS at 20:31

## 2020-01-01 RX ADMIN — PREDNISONE 20 MG: 20 TABLET ORAL at 07:33

## 2020-01-01 RX ADMIN — METOPROLOL TARTRATE 50 MG: 50 TABLET, FILM COATED ORAL at 11:16

## 2020-01-01 RX ADMIN — VASOPRESSIN 0.02 UNITS/MIN: 20 INJECTION INTRAVENOUS at 03:32

## 2020-01-01 RX ADMIN — PHENYLEPHRINE HYDROCHLORIDE 100 MCG/MIN: 10 INJECTION INTRAVENOUS at 07:18

## 2020-01-01 RX ADMIN — Medication 10 ML: at 15:02

## 2020-01-01 RX ADMIN — SODIUM CHLORIDE 250 ML: 9 INJECTION, SOLUTION INTRAVENOUS at 13:00

## 2020-01-01 RX ADMIN — SODIUM CHLORIDE 250 ML: 9 INJECTION, SOLUTION INTRAVENOUS at 09:08

## 2020-01-01 RX ADMIN — ENOXAPARIN SODIUM 40 MG: 40 INJECTION SUBCUTANEOUS at 09:25

## 2020-01-01 RX ADMIN — DIVALPROEX SODIUM 125 MG: 125 CAPSULE ORAL at 11:42

## 2020-01-01 RX ADMIN — VANCOMYCIN HYDROCHLORIDE 1000 MG: 1 INJECTION, POWDER, LYOPHILIZED, FOR SOLUTION INTRAVENOUS at 05:36

## 2020-01-01 RX ADMIN — BUDESONIDE AND FORMOTEROL FUMARATE DIHYDRATE 2 PUFF: 160; 4.5 AEROSOL RESPIRATORY (INHALATION) at 20:20

## 2020-01-01 RX ADMIN — BUDESONIDE AND FORMOTEROL FUMARATE DIHYDRATE 2 PUFF: 160; 4.5 AEROSOL RESPIRATORY (INHALATION) at 21:10

## 2020-01-01 RX ADMIN — HYDROCODONE BITARTRATE AND ACETAMINOPHEN 1 TABLET: 5; 325 TABLET ORAL at 10:28

## 2020-01-01 RX ADMIN — BUDESONIDE AND FORMOTEROL FUMARATE DIHYDRATE 2 PUFF: 160; 4.5 AEROSOL RESPIRATORY (INHALATION) at 20:59

## 2020-01-01 RX ADMIN — Medication 100 MG: at 18:28

## 2020-01-01 RX ADMIN — SODIUM CHLORIDE 10 ML: 9 INJECTION INTRAMUSCULAR; INTRAVENOUS; SUBCUTANEOUS at 23:06

## 2020-01-01 RX ADMIN — PROMETHAZINE HYDROCHLORIDE 12.5 MG: 25 TABLET ORAL at 19:36

## 2020-01-01 RX ADMIN — CEFAZOLIN 2 G: 10 INJECTION, POWDER, FOR SOLUTION INTRAVENOUS at 01:01

## 2020-01-01 RX ADMIN — VANCOMYCIN HYDROCHLORIDE 1250 MG: 1 INJECTION, POWDER, LYOPHILIZED, FOR SOLUTION INTRAVENOUS at 04:45

## 2020-01-01 RX ADMIN — PANTOPRAZOLE SODIUM 40 MG: 40 INJECTION, POWDER, FOR SOLUTION INTRAVENOUS at 09:00

## 2020-01-01 RX ADMIN — ACETAMINOPHEN 650 MG: 325 TABLET, FILM COATED ORAL at 11:43

## 2020-01-01 RX ADMIN — Medication 2 MCG/MIN: at 05:57

## 2020-01-01 RX ADMIN — BACLOFEN 10 MG: 10 TABLET ORAL at 09:07

## 2020-01-01 RX ADMIN — SODIUM BICARBONATE: 84 INJECTION, SOLUTION INTRAVENOUS at 20:20

## 2020-01-01 RX ADMIN — LEVOTHYROXINE SODIUM 125 MCG: 125 TABLET ORAL at 06:08

## 2020-01-01 RX ADMIN — HYDROCORTISONE SODIUM SUCCINATE 100 MG: 100 INJECTION, POWDER, FOR SOLUTION INTRAMUSCULAR; INTRAVENOUS at 03:00

## 2020-01-01 RX ADMIN — SODIUM CHLORIDE 10 ML: 9 INJECTION INTRAMUSCULAR; INTRAVENOUS; SUBCUTANEOUS at 09:00

## 2020-01-01 RX ADMIN — SODIUM PHOSPHATE, MONOBASIC, MONOHYDRATE AND SODIUM PHOSPHATE, DIBASIC, ANHYDROUS 15 MMOL: 276; 142 INJECTION, SOLUTION INTRAVENOUS at 15:28

## 2020-01-01 RX ADMIN — POTASSIUM CHLORIDE 10 MEQ: 7.46 INJECTION, SOLUTION INTRAVENOUS at 23:17

## 2020-01-01 RX ADMIN — Medication 10 ML: at 20:34

## 2020-01-01 RX ADMIN — ENOXAPARIN SODIUM 40 MG: 40 INJECTION SUBCUTANEOUS at 08:29

## 2020-01-01 RX ADMIN — SERTRALINE HYDROCHLORIDE 50 MG: 50 TABLET ORAL at 09:07

## 2020-01-01 RX ADMIN — NOREPINEPHRINE BITARTRATE 10 MCG/MIN: 1 INJECTION INTRAVENOUS at 13:02

## 2020-01-01 RX ADMIN — DOXYCYCLINE 100 MG: 100 CAPSULE ORAL at 22:17

## 2020-01-01 RX ADMIN — PHENYLEPHRINE HYDROCHLORIDE 200 MCG/MIN: 10 INJECTION INTRAVENOUS at 12:18

## 2020-01-01 RX ADMIN — NOREPINEPHRINE BITARTRATE 30 MCG/MIN: 1 INJECTION INTRAVENOUS at 23:57

## 2020-01-01 RX ADMIN — CEFEPIME HYDROCHLORIDE 2 G: 2 INJECTION, POWDER, FOR SOLUTION INTRAVENOUS at 01:30

## 2020-01-01 RX ADMIN — BUDESONIDE AND FORMOTEROL FUMARATE DIHYDRATE 2 PUFF: 160; 4.5 AEROSOL RESPIRATORY (INHALATION) at 10:09

## 2020-01-01 RX ADMIN — Medication 10 ML: at 20:48

## 2020-01-01 RX ADMIN — BUDESONIDE AND FORMOTEROL FUMARATE DIHYDRATE 2 PUFF: 160; 4.5 AEROSOL RESPIRATORY (INHALATION) at 08:46

## 2020-01-01 RX ADMIN — ALBUMIN (HUMAN) 25 G: 0.25 INJECTION, SOLUTION INTRAVENOUS at 23:35

## 2020-01-01 RX ADMIN — SODIUM CHLORIDE 80 ML: 9 INJECTION, SOLUTION INTRAVENOUS at 03:04

## 2020-01-01 RX ADMIN — BUDESONIDE AND FORMOTEROL FUMARATE DIHYDRATE 2 PUFF: 160; 4.5 AEROSOL RESPIRATORY (INHALATION) at 19:39

## 2020-01-01 RX ADMIN — SODIUM BICARBONATE: 84 INJECTION, SOLUTION INTRAVENOUS at 09:18

## 2020-01-01 RX ADMIN — LEVOFLOXACIN 750 MG: 5 INJECTION, SOLUTION INTRAVENOUS at 14:18

## 2020-01-01 RX ADMIN — ACETAMINOPHEN 650 MG: 325 TABLET, FILM COATED ORAL at 10:40

## 2020-01-01 RX ADMIN — SODIUM CHLORIDE: 9 INJECTION, SOLUTION INTRAVENOUS at 03:28

## 2020-01-01 RX ADMIN — BUDESONIDE AND FORMOTEROL FUMARATE DIHYDRATE 2 PUFF: 160; 4.5 AEROSOL RESPIRATORY (INHALATION) at 20:46

## 2020-01-01 RX ADMIN — VASOPRESSIN 0.04 UNITS/MIN: 20 INJECTION INTRAVENOUS at 03:02

## 2020-01-01 RX ADMIN — FUROSEMIDE 20 MG: 10 INJECTION, SOLUTION INTRAMUSCULAR; INTRAVENOUS at 11:26

## 2020-01-01 RX ADMIN — SODIUM BICARBONATE: 84 INJECTION, SOLUTION INTRAVENOUS at 20:34

## 2020-01-01 RX ADMIN — LEVOFLOXACIN 750 MG: 5 INJECTION, SOLUTION INTRAVENOUS at 17:06

## 2020-01-01 RX ADMIN — MIRTAZAPINE 7.5 MG: 15 TABLET, FILM COATED ORAL at 23:00

## 2020-01-01 RX ADMIN — VASOPRESSIN 0.04 UNITS/MIN: 20 INJECTION INTRAVENOUS at 18:40

## 2020-01-01 RX ADMIN — LEVOTHYROXINE SODIUM 125 MCG: 125 TABLET ORAL at 05:36

## 2020-01-01 RX ADMIN — DIVALPROEX SODIUM 125 MG: 125 CAPSULE ORAL at 20:42

## 2020-01-01 RX ADMIN — MIRTAZAPINE 7.5 MG: 15 TABLET, FILM COATED ORAL at 20:21

## 2020-01-01 RX ADMIN — CEFEPIME 2 G: 2 INJECTION, POWDER, FOR SOLUTION INTRAVENOUS at 15:28

## 2020-01-01 RX ADMIN — MAGNESIUM SULFATE HEPTAHYDRATE 1 G: 1 INJECTION, SOLUTION INTRAVENOUS at 06:29

## 2020-01-01 RX ADMIN — Medication 10 ML: at 20:23

## 2020-01-01 RX ADMIN — FERROUS SULFATE TAB 325 MG (65 MG ELEMENTAL FE) 325 MG: 325 (65 FE) TAB at 09:15

## 2020-01-01 RX ADMIN — BACLOFEN 10 MG: 10 TABLET ORAL at 23:00

## 2020-01-01 RX ADMIN — SERTRALINE HYDROCHLORIDE 50 MG: 50 TABLET ORAL at 09:15

## 2020-01-01 RX ADMIN — SODIUM CHLORIDE, POTASSIUM CHLORIDE, SODIUM LACTATE AND CALCIUM CHLORIDE: 600; 310; 30; 20 INJECTION, SOLUTION INTRAVENOUS at 05:59

## 2020-01-01 RX ADMIN — CEFAZOLIN 2 G: 10 INJECTION, POWDER, FOR SOLUTION INTRAVENOUS at 17:28

## 2020-01-01 RX ADMIN — HYDROCORTISONE SODIUM SUCCINATE 100 MG: 100 INJECTION, POWDER, FOR SOLUTION INTRAMUSCULAR; INTRAVENOUS at 12:48

## 2020-01-01 RX ADMIN — Medication 10 ML: at 23:20

## 2020-01-01 RX ADMIN — NOREPINEPHRINE BITARTRATE 30 MCG/MIN: 1 INJECTION INTRAVENOUS at 08:30

## 2020-01-01 RX ADMIN — Medication 10 ML: at 08:47

## 2020-01-01 RX ADMIN — METRONIDAZOLE 500 MG: 500 INJECTION, SOLUTION INTRAVENOUS at 20:02

## 2020-01-01 RX ADMIN — BACLOFEN 10 MG: 10 TABLET ORAL at 22:17

## 2020-01-01 RX ADMIN — LEVOTHYROXINE SODIUM 125 MCG: 125 TABLET ORAL at 07:33

## 2020-01-01 RX ADMIN — BACLOFEN 10 MG: 10 TABLET ORAL at 20:32

## 2020-01-01 RX ADMIN — CEFAZOLIN 2 G: 10 INJECTION, POWDER, FOR SOLUTION INTRAVENOUS at 10:01

## 2020-01-01 RX ADMIN — HYDROCODONE BITARTRATE AND ACETAMINOPHEN 1 TABLET: 5; 325 TABLET ORAL at 20:37

## 2020-01-01 RX ADMIN — LEVOFLOXACIN 750 MG: 5 INJECTION, SOLUTION INTRAVENOUS at 14:39

## 2020-01-01 RX ADMIN — POTASSIUM CHLORIDE: 2 INJECTION, SOLUTION, CONCENTRATE INTRAVENOUS at 11:44

## 2020-01-01 RX ADMIN — LEVOFLOXACIN 750 MG: 5 INJECTION, SOLUTION INTRAVENOUS at 15:03

## 2020-01-01 RX ADMIN — CHLORHEXIDINE GLUCONATE 15 ML: 1.2 RINSE ORAL at 10:32

## 2020-01-01 RX ADMIN — DIVALPROEX SODIUM 125 MG: 125 CAPSULE ORAL at 20:59

## 2020-01-01 RX ADMIN — CEFAZOLIN 2 G: 10 INJECTION, POWDER, FOR SOLUTION INTRAVENOUS at 09:18

## 2020-01-01 RX ADMIN — TRAZODONE HYDROCHLORIDE 100 MG: 100 TABLET ORAL at 20:59

## 2020-01-01 RX ADMIN — ONDANSETRON 4 MG: 2 INJECTION INTRAMUSCULAR; INTRAVENOUS at 16:30

## 2020-01-01 RX ADMIN — PREDNISONE 20 MG: 20 TABLET ORAL at 09:15

## 2020-01-01 RX ADMIN — Medication 10 ML: at 20:59

## 2020-01-01 RX ADMIN — MAGNESIUM SULFATE HEPTAHYDRATE 1 G: 1 INJECTION, SOLUTION INTRAVENOUS at 11:08

## 2020-01-01 RX ADMIN — ENOXAPARIN SODIUM 40 MG: 40 INJECTION SUBCUTANEOUS at 08:46

## 2020-01-01 RX ADMIN — IOPAMIDOL 75 ML: 755 INJECTION, SOLUTION INTRAVENOUS at 03:04

## 2020-01-01 RX ADMIN — CEFAZOLIN 2 G: 10 INJECTION, POWDER, FOR SOLUTION INTRAVENOUS at 00:01

## 2020-01-01 RX ADMIN — LEVOTHYROXINE SODIUM 125 MCG: 125 TABLET ORAL at 06:31

## 2020-01-01 RX ADMIN — LIDOCAINE HYDROCHLORIDE 20 ML: 10 INJECTION, SOLUTION INFILTRATION; PERINEURAL at 00:45

## 2020-01-01 RX ADMIN — Medication 10 ML: at 23:01

## 2020-01-01 RX ADMIN — ENOXAPARIN SODIUM 40 MG: 40 INJECTION SUBCUTANEOUS at 09:06

## 2020-01-01 RX ADMIN — MIRTAZAPINE 7.5 MG: 15 TABLET, FILM COATED ORAL at 20:59

## 2020-01-01 RX ADMIN — MAGNESIUM SULFATE HEPTAHYDRATE 1 G: 1 INJECTION, SOLUTION INTRAVENOUS at 05:08

## 2020-01-01 RX ADMIN — BUDESONIDE AND FORMOTEROL FUMARATE DIHYDRATE 2 PUFF: 160; 4.5 AEROSOL RESPIRATORY (INHALATION) at 23:00

## 2020-01-01 RX ADMIN — VANCOMYCIN HYDROCHLORIDE 1000 MG: 1 INJECTION, POWDER, LYOPHILIZED, FOR SOLUTION INTRAVENOUS at 05:37

## 2020-01-01 RX ADMIN — PREDNISONE 20 MG: 20 TABLET ORAL at 08:59

## 2020-01-01 RX ADMIN — SODIUM CHLORIDE: 9 INJECTION, SOLUTION INTRAVENOUS at 23:30

## 2020-01-01 RX ADMIN — TRAZODONE HYDROCHLORIDE 50 MG: 50 TABLET ORAL at 22:17

## 2020-01-01 RX ADMIN — TRAZODONE HYDROCHLORIDE 100 MG: 100 TABLET ORAL at 23:00

## 2020-01-01 RX ADMIN — CEFAZOLIN 2 G: 10 INJECTION, POWDER, FOR SOLUTION INTRAVENOUS at 00:09

## 2020-01-01 RX ADMIN — Medication 10 ML: at 09:16

## 2020-01-01 RX ADMIN — ENOXAPARIN SODIUM 40 MG: 40 INJECTION SUBCUTANEOUS at 09:28

## 2020-01-01 RX ADMIN — CEFAZOLIN 2 G: 10 INJECTION, POWDER, FOR SOLUTION INTRAVENOUS at 21:00

## 2020-01-01 RX ADMIN — MORPHINE SULFATE 2 MG: 2 INJECTION, SOLUTION INTRAMUSCULAR; INTRAVENOUS at 19:40

## 2020-01-01 RX ADMIN — CEFAZOLIN 2 G: 10 INJECTION, POWDER, FOR SOLUTION INTRAVENOUS at 00:15

## 2020-01-01 RX ADMIN — BACLOFEN 10 MG: 10 TABLET ORAL at 09:15

## 2020-01-01 RX ADMIN — HYDROCODONE BITARTRATE AND ACETAMINOPHEN 1 TABLET: 5; 325 TABLET ORAL at 20:32

## 2020-01-01 RX ADMIN — HYDROCORTISONE SODIUM SUCCINATE 100 MG: 100 INJECTION, POWDER, FOR SOLUTION INTRAMUSCULAR; INTRAVENOUS at 19:40

## 2020-01-01 RX ADMIN — SODIUM BICARBONATE 100 MEQ: 84 INJECTION, SOLUTION INTRAVENOUS at 07:29

## 2020-01-01 RX ADMIN — FUROSEMIDE 20 MG: 10 INJECTION, SOLUTION INTRAMUSCULAR; INTRAVENOUS at 09:06

## 2020-01-01 RX ADMIN — METOPROLOL TARTRATE 50 MG: 50 TABLET, FILM COATED ORAL at 19:42

## 2020-01-01 RX ADMIN — FENTANYL CITRATE 25 MCG/HR: 0.05 INJECTION, SOLUTION INTRAMUSCULAR; INTRAVENOUS at 21:03

## 2020-01-01 RX ADMIN — Medication 10 ML: at 10:17

## 2020-01-01 RX ADMIN — SERTRALINE HYDROCHLORIDE 50 MG: 50 TABLET ORAL at 10:16

## 2020-01-01 RX ADMIN — SODIUM CHLORIDE 500 ML: 0.9 INJECTION, SOLUTION INTRAVENOUS at 18:10

## 2020-01-01 RX ADMIN — METOPROLOL TARTRATE 50 MG: 50 TABLET, FILM COATED ORAL at 00:47

## 2020-01-01 RX ADMIN — DOXYCYCLINE 100 MG: 100 CAPSULE ORAL at 18:04

## 2020-01-01 RX ADMIN — PHENYLEPHRINE HYDROCHLORIDE 200 MCG/MIN: 10 INJECTION INTRAVENOUS at 08:00

## 2020-01-01 RX ADMIN — DIVALPROEX SODIUM 125 MG: 125 CAPSULE ORAL at 23:00

## 2020-01-01 RX ADMIN — ETOMIDATE 20 MG: 2 INJECTION, SOLUTION INTRAVENOUS at 18:24

## 2020-01-01 RX ADMIN — DIVALPROEX SODIUM 125 MG: 125 CAPSULE ORAL at 10:28

## 2020-01-01 RX ADMIN — DIVALPROEX SODIUM 125 MG: 125 CAPSULE ORAL at 20:37

## 2020-01-01 RX ADMIN — VASOPRESSIN 0.04 UNITS/MIN: 20 INJECTION INTRAVENOUS at 09:21

## 2020-01-01 RX ADMIN — DIVALPROEX SODIUM 125 MG: 125 CAPSULE ORAL at 11:58

## 2020-01-01 RX ADMIN — BUDESONIDE AND FORMOTEROL FUMARATE DIHYDRATE 2 PUFF: 160; 4.5 AEROSOL RESPIRATORY (INHALATION) at 11:19

## 2020-01-01 RX ADMIN — METOPROLOL TARTRATE 50 MG: 50 TABLET, FILM COATED ORAL at 09:29

## 2020-01-01 RX ADMIN — ENOXAPARIN SODIUM 40 MG: 40 INJECTION SUBCUTANEOUS at 11:16

## 2020-01-01 RX ADMIN — DIVALPROEX SODIUM 125 MG: 125 CAPSULE ORAL at 09:09

## 2020-01-01 RX ADMIN — CEFAZOLIN 2 G: 10 INJECTION, POWDER, FOR SOLUTION INTRAVENOUS at 01:00

## 2020-01-01 RX ADMIN — DIVALPROEX SODIUM 125 MG: 125 CAPSULE ORAL at 21:00

## 2020-01-01 RX ADMIN — SODIUM CHLORIDE 20 ML: 9 INJECTION, SOLUTION INTRAVENOUS at 09:14

## 2020-01-01 RX ADMIN — PANTOPRAZOLE SODIUM 40 MG: 40 INJECTION, POWDER, FOR SOLUTION INTRAVENOUS at 09:59

## 2020-01-01 RX ADMIN — TRAZODONE HYDROCHLORIDE 100 MG: 100 TABLET ORAL at 21:08

## 2020-01-01 RX ADMIN — Medication 10 ML: at 23:47

## 2020-01-01 RX ADMIN — PHENYLEPHRINE HYDROCHLORIDE 200 MCG/MIN: 10 INJECTION INTRAVENOUS at 16:22

## 2020-01-01 RX ADMIN — TRAZODONE HYDROCHLORIDE 50 MG: 50 TABLET ORAL at 19:36

## 2020-01-01 RX ADMIN — VANCOMYCIN HYDROCHLORIDE 1000 MG: 1 INJECTION, POWDER, LYOPHILIZED, FOR SOLUTION INTRAVENOUS at 00:21

## 2020-01-01 RX ADMIN — CEFEPIME HYDROCHLORIDE 2 G: 2 INJECTION, POWDER, FOR SOLUTION INTRAVENOUS at 12:48

## 2020-01-01 RX ADMIN — BUDESONIDE AND FORMOTEROL FUMARATE DIHYDRATE 2 PUFF: 160; 4.5 AEROSOL RESPIRATORY (INHALATION) at 08:59

## 2020-01-01 RX ADMIN — CHLORHEXIDINE GLUCONATE 15 ML: 1.2 RINSE ORAL at 19:40

## 2020-01-01 RX ADMIN — ALBUMIN (HUMAN) 25 G: 0.25 INJECTION, SOLUTION INTRAVENOUS at 13:32

## 2020-01-01 RX ADMIN — POTASSIUM CHLORIDE 10 MEQ: 7.46 INJECTION, SOLUTION INTRAVENOUS at 17:29

## 2020-01-01 RX ADMIN — FUROSEMIDE 20 MG: 10 INJECTION, SOLUTION INTRAMUSCULAR; INTRAVENOUS at 09:25

## 2020-01-01 RX ADMIN — PHENYLEPHRINE HYDROCHLORIDE 200 MCG/MIN: 10 INJECTION INTRAVENOUS at 05:30

## 2020-01-01 RX ADMIN — FENTANYL CITRATE 25 MCG: 50 INJECTION INTRAMUSCULAR; INTRAVENOUS at 00:13

## 2020-01-01 RX ADMIN — SERTRALINE HYDROCHLORIDE 50 MG: 50 TABLET ORAL at 09:28

## 2020-01-01 RX ADMIN — DOXYCYCLINE 100 MG: 100 CAPSULE ORAL at 08:59

## 2020-01-01 RX ADMIN — FERROUS SULFATE TAB 325 MG (65 MG ELEMENTAL FE) 325 MG: 325 (65 FE) TAB at 15:20

## 2020-01-01 RX ADMIN — CEFEPIME HYDROCHLORIDE 2 G: 2 INJECTION, POWDER, FOR SOLUTION INTRAVENOUS at 03:39

## 2020-01-01 RX ADMIN — FUROSEMIDE 20 MG: 10 INJECTION, SOLUTION INTRAMUSCULAR; INTRAVENOUS at 10:08

## 2020-01-01 RX ADMIN — VANCOMYCIN HYDROCHLORIDE 1000 MG: 1 INJECTION, POWDER, LYOPHILIZED, FOR SOLUTION INTRAVENOUS at 06:20

## 2020-01-01 RX ADMIN — LEVOTHYROXINE SODIUM 125 MCG: 125 TABLET ORAL at 06:20

## 2020-01-01 RX ADMIN — BUDESONIDE AND FORMOTEROL FUMARATE DIHYDRATE 2 PUFF: 160; 4.5 AEROSOL RESPIRATORY (INHALATION) at 20:32

## 2020-01-01 RX ADMIN — HYDROCODONE BITARTRATE AND ACETAMINOPHEN 1 TABLET: 5; 325 TABLET ORAL at 21:08

## 2020-01-01 RX ADMIN — BACLOFEN 10 MG: 10 TABLET ORAL at 21:08

## 2020-01-01 RX ADMIN — SODIUM CHLORIDE: 9 INJECTION, SOLUTION INTRAVENOUS at 17:06

## 2020-01-01 RX ADMIN — CEFAZOLIN 2 G: 10 INJECTION, POWDER, FOR SOLUTION INTRAVENOUS at 17:50

## 2020-01-01 RX ADMIN — ENOXAPARIN SODIUM 40 MG: 40 INJECTION SUBCUTANEOUS at 09:29

## 2020-01-01 RX ADMIN — VASOPRESSIN 0.02 UNITS/MIN: 20 INJECTION INTRAVENOUS at 10:20

## 2020-01-01 RX ADMIN — PROMETHAZINE HYDROCHLORIDE 25 MG: 25 INJECTION INTRAMUSCULAR; INTRAVENOUS at 21:11

## 2020-01-01 RX ADMIN — DIVALPROEX SODIUM 125 MG: 125 CAPSULE ORAL at 11:19

## 2020-01-01 RX ADMIN — SERTRALINE HYDROCHLORIDE 50 MG: 50 TABLET ORAL at 09:29

## 2020-01-01 RX ADMIN — BUDESONIDE AND FORMOTEROL FUMARATE DIHYDRATE 2 PUFF: 160; 4.5 AEROSOL RESPIRATORY (INHALATION) at 22:24

## 2020-01-01 RX ADMIN — HYDROCODONE BITARTRATE AND ACETAMINOPHEN 1 TABLET: 5; 325 TABLET ORAL at 08:59

## 2020-01-01 RX ADMIN — FUROSEMIDE 20 MG: 10 INJECTION, SOLUTION INTRAMUSCULAR; INTRAVENOUS at 11:54

## 2020-01-01 RX ADMIN — MIRTAZAPINE 7.5 MG: 15 TABLET, FILM COATED ORAL at 22:17

## 2020-01-01 RX ADMIN — HYDROCODONE BITARTRATE AND ACETAMINOPHEN 1 TABLET: 5; 325 TABLET ORAL at 09:54

## 2020-01-01 RX ADMIN — MORPHINE SULFATE 2 MG: 2 INJECTION, SOLUTION INTRAMUSCULAR; INTRAVENOUS at 04:30

## 2020-01-01 RX ADMIN — HYDROCORTISONE SODIUM SUCCINATE 100 MG: 100 INJECTION, POWDER, FOR SOLUTION INTRAMUSCULAR; INTRAVENOUS at 06:33

## 2020-01-01 RX ADMIN — SERTRALINE HYDROCHLORIDE 50 MG: 50 TABLET ORAL at 09:24

## 2020-01-01 RX ADMIN — SODIUM BICARBONATE: 84 INJECTION, SOLUTION INTRAVENOUS at 07:18

## 2020-01-01 RX ADMIN — BACLOFEN 10 MG: 10 TABLET ORAL at 20:37

## 2020-01-01 RX ADMIN — Medication 10 ML: at 19:40

## 2020-01-01 RX ADMIN — POTASSIUM CHLORIDE 40 MEQ: 1500 TABLET, EXTENDED RELEASE ORAL at 09:14

## 2020-01-01 RX ADMIN — DIVALPROEX SODIUM 125 MG: 125 CAPSULE ORAL at 20:34

## 2020-01-01 RX ADMIN — SODIUM CHLORIDE 500 ML: 9 INJECTION, SOLUTION INTRAVENOUS at 04:26

## 2020-01-01 RX ADMIN — CEFAZOLIN 2 G: 10 INJECTION, POWDER, FOR SOLUTION INTRAVENOUS at 10:09

## 2020-01-01 RX ADMIN — MEGESTROL ACETATE 200 MG: 40 SUSPENSION ORAL at 13:40

## 2020-01-01 RX ADMIN — MORPHINE SULFATE 2 MG: 2 INJECTION, SOLUTION INTRAMUSCULAR; INTRAVENOUS at 14:32

## 2020-01-01 RX ADMIN — BACLOFEN 10 MG: 10 TABLET ORAL at 08:28

## 2020-01-01 RX ADMIN — Medication 10 ML: at 11:54

## 2020-01-01 RX ADMIN — TRAZODONE HYDROCHLORIDE 50 MG: 50 TABLET ORAL at 20:32

## 2020-01-01 RX ADMIN — CEFAZOLIN 2 G: 10 INJECTION, POWDER, FOR SOLUTION INTRAVENOUS at 09:08

## 2020-01-01 RX ADMIN — SODIUM CHLORIDE: 9 INJECTION, SOLUTION INTRAVENOUS at 20:24

## 2020-01-01 RX ADMIN — METRONIDAZOLE 500 MG: 500 INJECTION, SOLUTION INTRAVENOUS at 11:28

## 2020-01-01 RX ADMIN — POTASSIUM CHLORIDE 10 MEQ: 7.46 INJECTION, SOLUTION INTRAVENOUS at 13:08

## 2020-01-01 RX ADMIN — DEXMEDETOMIDINE HYDROCHLORIDE 0.8 MCG/KG/HR: 400 INJECTION INTRAVENOUS at 02:46

## 2020-01-01 RX ADMIN — VANCOMYCIN HYDROCHLORIDE 1000 MG: 1 INJECTION, POWDER, LYOPHILIZED, FOR SOLUTION INTRAVENOUS at 09:58

## 2020-01-01 RX ADMIN — DIVALPROEX SODIUM 125 MG: 125 CAPSULE ORAL at 09:28

## 2020-01-01 RX ADMIN — FERROUS SULFATE TAB 325 MG (65 MG ELEMENTAL FE) 325 MG: 325 (65 FE) TAB at 10:16

## 2020-01-01 RX ADMIN — PANTOPRAZOLE SODIUM 40 MG: 40 INJECTION, POWDER, FOR SOLUTION INTRAVENOUS at 23:06

## 2020-01-01 RX ADMIN — CEFEPIME HYDROCHLORIDE 2 G: 2 INJECTION, POWDER, FOR SOLUTION INTRAVENOUS at 01:24

## 2020-01-01 RX ADMIN — MORPHINE SULFATE 2 MG: 2 INJECTION, SOLUTION INTRAMUSCULAR; INTRAVENOUS at 10:46

## 2020-01-01 RX ADMIN — Medication 10 ML: at 03:04

## 2020-01-01 RX ADMIN — ONDANSETRON 4 MG: 2 INJECTION INTRAMUSCULAR; INTRAVENOUS at 09:15

## 2020-01-01 RX ADMIN — SODIUM CHLORIDE 500 ML: 9 INJECTION, SOLUTION INTRAVENOUS at 14:01

## 2020-01-01 RX ADMIN — CEFEPIME 2 G: 2 INJECTION, POWDER, FOR SOLUTION INTRAVENOUS at 03:27

## 2020-01-01 RX ADMIN — SODIUM CHLORIDE 500 ML: 9 INJECTION, SOLUTION INTRAVENOUS at 17:47

## 2020-01-01 RX ADMIN — DIVALPROEX SODIUM 125 MG: 125 CAPSULE ORAL at 09:29

## 2020-01-01 RX ADMIN — DOXYCYCLINE 100 MG: 100 CAPSULE ORAL at 09:15

## 2020-01-01 RX ADMIN — POTASSIUM CHLORIDE 10 MEQ: 7.46 INJECTION, SOLUTION INTRAVENOUS at 18:35

## 2020-01-01 RX ADMIN — BUDESONIDE AND FORMOTEROL FUMARATE DIHYDRATE 2 PUFF: 160; 4.5 AEROSOL RESPIRATORY (INHALATION) at 09:15

## 2020-01-01 RX ADMIN — MIRTAZAPINE 7.5 MG: 15 TABLET, FILM COATED ORAL at 20:42

## 2020-01-01 RX ADMIN — SERTRALINE HYDROCHLORIDE 50 MG: 50 TABLET ORAL at 08:28

## 2020-01-01 RX ADMIN — CEFAZOLIN 2 G: 10 INJECTION, POWDER, FOR SOLUTION INTRAVENOUS at 11:43

## 2020-01-01 RX ADMIN — FUROSEMIDE 20 MG: 10 INJECTION, SOLUTION INTRAMUSCULAR; INTRAVENOUS at 10:26

## 2020-01-01 RX ADMIN — SERTRALINE HYDROCHLORIDE 50 MG: 50 TABLET ORAL at 15:28

## 2020-01-01 RX ADMIN — HYDROCODONE BITARTRATE AND ACETAMINOPHEN 1 TABLET: 5; 325 TABLET ORAL at 22:21

## 2020-01-01 RX ADMIN — BUDESONIDE AND FORMOTEROL FUMARATE DIHYDRATE 2 PUFF: 160; 4.5 AEROSOL RESPIRATORY (INHALATION) at 09:28

## 2020-01-01 RX ADMIN — FERROUS SULFATE TAB 325 MG (65 MG ELEMENTAL FE) 325 MG: 325 (65 FE) TAB at 18:04

## 2020-01-01 RX ADMIN — POTASSIUM CHLORIDE 10 MEQ: 7.46 INJECTION, SOLUTION INTRAVENOUS at 11:16

## 2020-01-01 RX ADMIN — CEFAZOLIN 2 G: 10 INJECTION, POWDER, FOR SOLUTION INTRAVENOUS at 17:40

## 2020-01-01 RX ADMIN — BACLOFEN 10 MG: 10 TABLET ORAL at 20:42

## 2020-01-01 RX ADMIN — MAGNESIUM SULFATE HEPTAHYDRATE 1 G: 1 INJECTION, SOLUTION INTRAVENOUS at 13:04

## 2020-01-01 RX ADMIN — HYDROCODONE BITARTRATE AND ACETAMINOPHEN 1 TABLET: 5; 325 TABLET ORAL at 09:40

## 2020-01-01 RX ADMIN — POTASSIUM CHLORIDE 40 MEQ: 1500 TABLET, EXTENDED RELEASE ORAL at 18:05

## 2020-01-01 RX ADMIN — SERTRALINE HYDROCHLORIDE 50 MG: 50 TABLET ORAL at 11:26

## 2020-01-01 RX ADMIN — ENOXAPARIN SODIUM 40 MG: 40 INJECTION SUBCUTANEOUS at 10:08

## 2020-01-01 RX ADMIN — POTASSIUM CHLORIDE 10 MEQ: 7.46 INJECTION, SOLUTION INTRAVENOUS at 22:16

## 2020-01-01 RX ADMIN — LEVOTHYROXINE SODIUM 125 MCG: 125 TABLET ORAL at 05:29

## 2020-01-01 RX ADMIN — METOPROLOL TARTRATE 50 MG: 50 TABLET, FILM COATED ORAL at 08:46

## 2020-01-01 RX ADMIN — SODIUM CHLORIDE: 9 INJECTION, SOLUTION INTRAVENOUS at 16:28

## 2020-01-01 RX ADMIN — DOXYCYCLINE 100 MG: 100 CAPSULE ORAL at 07:33

## 2020-01-01 RX ADMIN — BACLOFEN 10 MG: 10 TABLET ORAL at 19:38

## 2020-01-01 RX ADMIN — HYDROCODONE BITARTRATE AND ACETAMINOPHEN 1 TABLET: 5; 325 TABLET ORAL at 04:54

## 2020-01-01 RX ADMIN — BUDESONIDE AND FORMOTEROL FUMARATE DIHYDRATE 2 PUFF: 160; 4.5 AEROSOL RESPIRATORY (INHALATION) at 09:09

## 2020-01-01 RX ADMIN — BUDESONIDE AND FORMOTEROL FUMARATE DIHYDRATE 2 PUFF: 160; 4.5 AEROSOL RESPIRATORY (INHALATION) at 09:25

## 2020-01-01 RX ADMIN — IPRATROPIUM BROMIDE AND ALBUTEROL SULFATE 3 ML: 2.5; .5 SOLUTION RESPIRATORY (INHALATION) at 17:23

## 2020-01-01 RX ADMIN — HYDROCODONE BITARTRATE AND ACETAMINOPHEN 1 TABLET: 5; 325 TABLET ORAL at 16:24

## 2020-01-01 RX ADMIN — POLYETHYLENE GLYCOL 3350 17 G: 17 POWDER, FOR SOLUTION ORAL at 11:39

## 2020-01-01 RX ADMIN — HYDROCODONE BITARTRATE AND ACETAMINOPHEN 1 TABLET: 5; 325 TABLET ORAL at 04:11

## 2020-01-01 RX ADMIN — VANCOMYCIN HYDROCHLORIDE 1500 MG: 1.5 INJECTION, POWDER, LYOPHILIZED, FOR SOLUTION INTRAVENOUS at 11:12

## 2020-01-01 RX ADMIN — HYDROCODONE BITARTRATE AND ACETAMINOPHEN 1 TABLET: 5; 325 TABLET ORAL at 11:58

## 2020-01-01 RX ADMIN — BACLOFEN 10 MG: 10 TABLET ORAL at 11:17

## 2020-01-01 RX ADMIN — LORAZEPAM 2 MG: 2 INJECTION INTRAMUSCULAR; INTRAVENOUS at 10:45

## 2020-01-01 RX ADMIN — BACLOFEN 10 MG: 10 TABLET ORAL at 20:59

## 2020-01-01 RX ADMIN — MEGESTROL ACETATE 200 MG: 40 SUSPENSION ORAL at 11:58

## 2020-01-01 RX ADMIN — LEVOTHYROXINE SODIUM 125 MCG: 125 TABLET ORAL at 15:28

## 2020-01-01 RX ADMIN — HYDROCODONE BITARTRATE AND ACETAMINOPHEN 1 TABLET: 5; 325 TABLET ORAL at 16:20

## 2020-01-01 RX ADMIN — PHENYLEPHRINE HYDROCHLORIDE 200 MCG/MIN: 10 INJECTION INTRAVENOUS at 21:02

## 2020-01-01 RX ADMIN — LEVOFLOXACIN 750 MG: 5 INJECTION, SOLUTION INTRAVENOUS at 14:59

## 2020-01-01 ASSESSMENT — PULMONARY FUNCTION TESTS
PIF_VALUE: 19
PIF_VALUE: 14
PIF_VALUE: 16
PIF_VALUE: 23
PIF_VALUE: 14
PIF_VALUE: 9
PIF_VALUE: 20
PIF_VALUE: 16
PIF_VALUE: 28
PIF_VALUE: 17
PIF_VALUE: 14
PIF_VALUE: 16
PIF_VALUE: 26
PIF_VALUE: 27
PIF_VALUE: 22
PIF_VALUE: 26
PIF_VALUE: 21
PIF_VALUE: 22
PIF_VALUE: 28
PIF_VALUE: 14
PIF_VALUE: 19
PIF_VALUE: 23
PIF_VALUE: 10
PIF_VALUE: 14
PIF_VALUE: 16
PIF_VALUE: 22
PIF_VALUE: 18
PIF_VALUE: 20
PIF_VALUE: 19
PIF_VALUE: 26
PIF_VALUE: 14
PIF_VALUE: 27
PIF_VALUE: 14
PIF_VALUE: 25
PIF_VALUE: 15
PIF_VALUE: 14
PIF_VALUE: 24
PIF_VALUE: 28
PIF_VALUE: 17
PIF_VALUE: 22
PIF_VALUE: 14
PIF_VALUE: 11
PIF_VALUE: 10
PIF_VALUE: 28
PIF_VALUE: 28
PIF_VALUE: 18
PIF_VALUE: 13
PIF_VALUE: 29
PIF_VALUE: 27
PIF_VALUE: 22
PIF_VALUE: 17
PIF_VALUE: 20
PIF_VALUE: 20
PIF_VALUE: 14
PIF_VALUE: 14
PIF_VALUE: 28
PIF_VALUE: 21
PIF_VALUE: 27
PIF_VALUE: 18
PIF_VALUE: 23
PIF_VALUE: 27
PIF_VALUE: 27
PIF_VALUE: 23
PIF_VALUE: 15
PIF_VALUE: 14
PIF_VALUE: 17
PIF_VALUE: 29
PIF_VALUE: 14
PIF_VALUE: 21
PIF_VALUE: 27
PIF_VALUE: 17
PIF_VALUE: 28
PIF_VALUE: 14
PIF_VALUE: 20
PIF_VALUE: 19
PIF_VALUE: 14
PIF_VALUE: 19
PIF_VALUE: 28
PIF_VALUE: 20
PIF_VALUE: 34
PIF_VALUE: 14
PIF_VALUE: 27
PIF_VALUE: 14
PIF_VALUE: 16
PIF_VALUE: 26
PIF_VALUE: 22
PIF_VALUE: 28
PIF_VALUE: 18
PIF_VALUE: 22
PIF_VALUE: 28
PIF_VALUE: 26
PIF_VALUE: 27
PIF_VALUE: 23
PIF_VALUE: 28
PIF_VALUE: 18
PIF_VALUE: 27
PIF_VALUE: 26
PIF_VALUE: 22
PIF_VALUE: 14
PIF_VALUE: 15
PIF_VALUE: 13
PIF_VALUE: 14
PIF_VALUE: 19
PIF_VALUE: 28
PIF_VALUE: 18
PIF_VALUE: 15
PIF_VALUE: 28
PIF_VALUE: 20
PIF_VALUE: 14
PIF_VALUE: 30
PIF_VALUE: 11
PIF_VALUE: 28
PIF_VALUE: 23
PIF_VALUE: 22
PIF_VALUE: 18
PIF_VALUE: 16
PIF_VALUE: 20

## 2020-01-01 ASSESSMENT — ENCOUNTER SYMPTOMS
ABDOMINAL PAIN: 0
NAUSEA: 0
BACK PAIN: 0
DIARRHEA: 0
ABDOMINAL PAIN: 0
NAUSEA: 0
CONSTIPATION: 0
NAUSEA: 0
DIARRHEA: 0
BACK PAIN: 0
EYES NEGATIVE: 1
NAUSEA: 0
WHEEZING: 0
CHEST TIGHTNESS: 0
CHEST TIGHTNESS: 0
SHORTNESS OF BREATH: 1
SHORTNESS OF BREATH: 1
WHEEZING: 0
CONSTIPATION: 0
SHORTNESS OF BREATH: 1
BACK PAIN: 0
DIARRHEA: 0
CONSTIPATION: 0
ABDOMINAL PAIN: 0
NAUSEA: 0
ABDOMINAL PAIN: 0
CHEST TIGHTNESS: 0
DIARRHEA: 0
RECTAL PAIN: 0
DIARRHEA: 0
ABDOMINAL PAIN: 0
ABDOMINAL PAIN: 0
CHEST TIGHTNESS: 0
NAUSEA: 1
WHEEZING: 0
DIARRHEA: 0
COUGH: 1
NAUSEA: 1
CONSTIPATION: 0
DIARRHEA: 0
BACK PAIN: 0
BACK PAIN: 0
CHEST TIGHTNESS: 0
SORE THROAT: 0
SHORTNESS OF BREATH: 0
CONSTIPATION: 0
CONSTIPATION: 0
ALLERGIC/IMMUNOLOGIC NEGATIVE: 1
WHEEZING: 0
SHORTNESS OF BREATH: 0
CHEST TIGHTNESS: 0
RESPIRATORY NEGATIVE: 1
NAUSEA: 0
NAUSEA: 0
COUGH: 1
BACK PAIN: 0
DIARRHEA: 0
WHEEZING: 0
ABDOMINAL PAIN: 0
WHEEZING: 0
BACK PAIN: 0
CHOKING: 0
CONSTIPATION: 0
CONSTIPATION: 0
BACK PAIN: 0
CHEST TIGHTNESS: 0
DIARRHEA: 1
SHORTNESS OF BREATH: 0
ABDOMINAL PAIN: 0
COUGH: 1
SHORTNESS OF BREATH: 1
BACK PAIN: 0
VOMITING: 0
TROUBLE SWALLOWING: 0
CONSTIPATION: 0
WHEEZING: 0
WHEEZING: 0
ABDOMINAL PAIN: 0
ABDOMINAL PAIN: 0
NAUSEA: 0
SHORTNESS OF BREATH: 1
DIARRHEA: 0
WHEEZING: 0
CHEST TIGHTNESS: 0
DIARRHEA: 0
CHEST TIGHTNESS: 0

## 2020-01-01 ASSESSMENT — PAIN SCALES - GENERAL
PAINLEVEL_OUTOF10: 8
PAINLEVEL_OUTOF10: 8
PAINLEVEL_OUTOF10: 6
PAINLEVEL_OUTOF10: 8
PAINLEVEL_OUTOF10: 4
PAINLEVEL_OUTOF10: 9
PAINLEVEL_OUTOF10: 10
PAINLEVEL_OUTOF10: 3
PAINLEVEL_OUTOF10: 0
PAINLEVEL_OUTOF10: 0
PAINLEVEL_OUTOF10: 8
PAINLEVEL_OUTOF10: 8
PAINLEVEL_OUTOF10: 10
PAINLEVEL_OUTOF10: 10
PAINLEVEL_OUTOF10: 9
PAINLEVEL_OUTOF10: 7
PAINLEVEL_OUTOF10: 9
PAINLEVEL_OUTOF10: 7
PAINLEVEL_OUTOF10: 9
PAINLEVEL_OUTOF10: 8
PAINLEVEL_OUTOF10: 0
PAINLEVEL_OUTOF10: 6
PAINLEVEL_OUTOF10: 6
PAINLEVEL_OUTOF10: 9
PAINLEVEL_OUTOF10: 8
PAINLEVEL_OUTOF10: 9
PAINLEVEL_OUTOF10: 8
PAINLEVEL_OUTOF10: 4
PAINLEVEL_OUTOF10: 10
PAINLEVEL_OUTOF10: 0
PAINLEVEL_OUTOF10: 10
PAINLEVEL_OUTOF10: 10
PAINLEVEL_OUTOF10: 8
PAINLEVEL_OUTOF10: 5
PAINLEVEL_OUTOF10: 8
PAINLEVEL_OUTOF10: 0
PAINLEVEL_OUTOF10: 9
PAINLEVEL_OUTOF10: 8
PAINLEVEL_OUTOF10: 3
PAINLEVEL_OUTOF10: 8
PAINLEVEL_OUTOF10: 7
PAINLEVEL_OUTOF10: 5
PAINLEVEL_OUTOF10: 4
PAINLEVEL_OUTOF10: 0
PAINLEVEL_OUTOF10: 5
PAINLEVEL_OUTOF10: 0
PAINLEVEL_OUTOF10: 9
PAINLEVEL_OUTOF10: 1
PAINLEVEL_OUTOF10: 10
PAINLEVEL_OUTOF10: 7

## 2020-01-01 ASSESSMENT — PAIN DESCRIPTION - ORIENTATION
ORIENTATION: LEFT
ORIENTATION: LEFT;RIGHT
ORIENTATION: RIGHT
ORIENTATION: LEFT
ORIENTATION: RIGHT;LEFT
ORIENTATION: LEFT;RIGHT

## 2020-01-01 ASSESSMENT — PAIN - FUNCTIONAL ASSESSMENT
PAIN_FUNCTIONAL_ASSESSMENT: 0-10
PAIN_FUNCTIONAL_ASSESSMENT: PREVENTS OR INTERFERES SOME ACTIVE ACTIVITIES AND ADLS
PAIN_FUNCTIONAL_ASSESSMENT: PREVENTS OR INTERFERES WITH ALL ACTIVE AND SOME PASSIVE ACTIVITIES

## 2020-01-01 ASSESSMENT — PAIN DESCRIPTION - LOCATION
LOCATION: GENERALIZED
LOCATION: GENERALIZED
LOCATION: BACK
LOCATION: LEG
LOCATION: ARM;LEG
LOCATION: GENERALIZED
LOCATION: LEG
LOCATION: LEG
LOCATION: GENERALIZED
LOCATION: GENERALIZED
LOCATION: LEG
LOCATION: BACK

## 2020-01-01 ASSESSMENT — PAIN DESCRIPTION - PAIN TYPE
TYPE: CHRONIC PAIN

## 2020-01-01 ASSESSMENT — PAIN DESCRIPTION - ONSET
ONSET: ON-GOING
ONSET: ON-GOING

## 2020-01-01 ASSESSMENT — PAIN SCALES - WONG BAKER: WONGBAKER_NUMERICALRESPONSE: 0

## 2020-01-01 ASSESSMENT — PAIN DESCRIPTION - PROGRESSION
CLINICAL_PROGRESSION: GRADUALLY WORSENING
CLINICAL_PROGRESSION: GRADUALLY WORSENING

## 2020-01-01 ASSESSMENT — PAIN DESCRIPTION - FREQUENCY
FREQUENCY: CONTINUOUS

## 2020-01-01 ASSESSMENT — PAIN DESCRIPTION - DESCRIPTORS
DESCRIPTORS: DISCOMFORT
DESCRIPTORS: ACHING
DESCRIPTORS: DISCOMFORT
DESCRIPTORS: ACHING
DESCRIPTORS: ACHING

## 2020-04-28 NOTE — PROGRESS NOTES
Called report to Beatris Latif, rn at [address] [place] [address] Rd. Notified her to watch residuals and her blood sugar. Informed her that last residual was 70 ml and last POCT was 83. [facility] Medicine Progress Note     Date: 2020 / Time: 7:22 AM     Patient:  London Velasco - 1 y.o. male  PMD: Smitha Rizo M.D.  CONSULTANTS: GI, General Surgery   Hospital Day # Hospital Day: 2    SUBJECTIVE:   Required fleet enema last night for constipation seen on CT, with 2x watery brown BM.  Pt initially developed brown drainage from his G-button, but this resolved and feeding was resumed.  Pt has tolerated feed since.  Has not stooled on his own since enema, has voided.  Otherwise slept well through the night, irritability has not improved.    OBJECTIVE:   Vitals:    Temp (24hrs), Av.1 °C (98.7 °F), Min:36.3 °C (97.4 °F), Max:37.6 °C (99.6 °F)     Oxygen: Pulse Oximetry: 97 %, O2 (LPM): 0, O2 Delivery Device: None - Room Air  Patient Vitals for the past 24 hrs:   BP Temp Temp src Pulse Resp SpO2 Weight   20 0355 83/48 37.3 °C (99.2 °F) Temporal 125 36 97 % --   20 0010 -- 37.2 °C (99 °F) Temporal 132 28 95 % --   20 2015 -- 36.8 °C (98.3 °F) Temporal 96 32 96 % --   20 1630 89/54 37.1 °C (98.7 °F) Temporal 123 30 96 % --   20 1450 -- 36.7 °C (98 °F) Temporal 129 30 -- --   20 1433 88/53 37.4 °C (99.4 °F) Temporal (!) 141 30 96 % --   20 1410 87/54 37.2 °C (98.9 °F) Temporal 138 30 97 % --   20 1306 103/56 37.6 °C (99.6 °F) Temporal (!) 143 30 97 % --   20 1120 -- 36.8 °C (98.3 °F) Temporal 138 28 95 % --   20 1000 (!) 123/97 36.3 °C (97.4 °F) Temporal (!) 146 32 96 % 11.4 kg (25 lb 2.1 oz)       In/Out:    I/O last 3 completed shifts:  In: 346.8 [I.V.:332.5]  Out: 151     IV Fluids: D5NS + 20KCl   Feeds: G-tube feeds as below  Lines/Tubes: G-tube, PIV    Physical Exam  Gen:  NAD, global developmental delay  HEENT: MMM, EOMI  Cardio: RRR, clear s1/s2, 3/6 systolic murmur  Resp:  Equal bilat, clear to auscultation  GI/: G-tube in place, Small area of erythema surrounding, no drainage currently. Soft, mildly distended, TTP  throughout, normal bowel sounds, no guarding/rebound  Neuro: Non-verbal, global developmental delay, moves all extremities, no contractures, intact grasp and plantar grasp reflexes.  Skin/Extremities: Cap refill <3sec, warm/well perfused, no rash, normal extremities    Labs/X-ray:  Recent/pertinent lab results & imaging reviewed.     Medications:  Current Facility-Administered Medications   Medication Dose   • glycopyrrolate (CUVPOSA) oral solution 500 mcg  0.5 mg   • ibuprofen (MOTRIN) oral suspension 100 mg  100 mg   • metoclopramide (REGLAN) 5 MG/5ML solution 1 mg  1 mg   • normal saline PF 0.9 % 2 mL  2 mL   • dextrose 5 % and 0.9 % NaCl with KCl 20 mEq infusion     • lidocaine-prilocaine (EMLA) 2.5-2.5 % cream     • cefTRIAXone (ROCEPHIN) 570 mg in D5W 14.25 mL IV syringe  50 mg/kg/day   • polyethylene glycol/lytes (MIRALAX) PACKET 1 Packet  1 Packet       ASSESSMENT/PLAN:   1 y.o. male with history of cerebral palsy, G-tube dependent, presenting with discomfort, abdominal pain, fevers, found to have lab and imaging evidence of acute UTI.     #Abdominal Pain  #Cystitis, possible Pyelonephritis  - UTI, possibly related to retention secondary to constipation over the last few days  - CT evidence of ascending infection into L kidney. Appendicoliths present without evidence of acute appendicitis  - Continue Rocephin pending urine culture results  - Tylenol/ibuprofen for fever/pain  - IVF with D5 NS +20 KCl at 42 mL/h  - Urine culture pending  - Dr. Killian following  - Will likely need [facility] as outpatient once acute infection resolves     #Constipation  - Chronic, acute worsening over the past 2 days  - Continue home daily MiraLAX  - Glycerin suppository as needed  - s/p 1x Fleet Enema with subsequent stool; though, also developed brown discharge from G-tube.    - Follows with Dr. Pizarro outpatient  - Repeat fleet this am, stop tube feeds, run Golytely at 5 mg/kg/h increasing to 10 as tolerated.     #G-tube  dependent  - NPO, tube feeds only.  Developed brown discharge from G-tube following fleet enema  - GI consult today - increased Reglan to 4 times per day.  - Hold home feeds of 20-21 hours continuous feeds of EleCare Davey: 32 ounce water +24 scoops for total of ~42 ounces run at 50 to 70 cc/h.  - Dietitian recently added fiber as well, due to constipation  - Cuvposa for aspiration  - Monitor I/O's  - Daily weights     #Cerebral palsy  - Supportive care  - Follows with pediatric neurology    Dispo: Inpatient for management of possible Pyelonephritis and management of feeding regimen with GI.    As attending physician, I personally performed a history and physical examination on this patient and reviewed pertinent labs/diagnostics/test results. I provided face to face coordination of the health care team, inclusive of the nurse practitioner/resident/medical student, performed a bedside assesment and directed the patient's assessment, management and plan of care as reflected in the documentation above.
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
## 2020-12-03 PROBLEM — J18.9 HOSPITAL-ACQUIRED PNEUMONIA: Status: ACTIVE | Noted: 2020-01-01

## 2020-12-03 PROBLEM — I95.9 HYPOTENSION: Status: ACTIVE | Noted: 2020-01-01

## 2020-12-03 NOTE — ED NOTES
Report given to Erasmo Bob from \[facility]\"". Report method in person   The following was reviewed with receiving RN:   Current vital signs:  BP (!) 118/39   Pulse 68   Temp 98 °F (36.7 °C) (Oral)   Resp 18   Ht 5' (1.524 m)   Wt 115 lb (52.2 kg)   SpO2 95%   BMI 22.46 kg/m²                MEWS Score: 2     Any medication or safety alerts were reviewed. Any pending diagnostics and notifications were also reviewed, as well as any safety concerns or issues, abnormal labs, abnormal imaging, and abnormal assessment findings. Questions were answered.             Evelyn Marinelli RN  12/03/20 8444

## 2020-12-03 NOTE — ED NOTES
RN bladder scanned pt, pt has 334mL of urine noted in her bladder. RN attempted to insert a dyer catheter, attempt unsuccessful.       Smitty Mohs, RN  12/03/20 1575

## 2020-12-03 NOTE — ED NOTES
Patient had large bowel movement. Unable to tell if patient voided. Patient cleaned and placed in clean brief.       Nuvia Jarvis RN  12/03/20 8969

## 2020-12-03 NOTE — ED NOTES
Admission Dx: Hypotension    Pts Chief Complaints on Arrival: Low blood pressure    ADL's - Total assistance    Pending Diagnostics:  None    Residence PTA: [facility] Considerations/Circumstances:  Fall risk    Vitals: Current vital signs:  BP (!) 131/48   Pulse 68   Temp 98 °F (36.7 °C) (Oral)   Resp 18   Ht 5' (1.524 m)   Wt 115 lb (52.2 kg)   SpO2 96%   BMI 22.46 kg/m²                MEWS Score: 2            Maritza Hassan RN  12/03/20 0884

## 2020-12-03 NOTE — PLAN OF CARE
Problem: Falls - Risk of:  Goal: Will remain free from falls  Description: Will remain free from falls  Outcome: Ongoing   No falls this shift. Precautions in place. Problem: Pain:  Goal: Pain level will decrease  Description: Pain level will decrease  Outcome: Ongoing   Pain assessed on rounds. Non pharm utilized to control. Problem: Skin Integrity:  Goal: Will show no infection signs and symptoms  Description: Will show no infection signs and symptoms  Outcome: Ongoing   Head to toe charted. Multiple pressure injuries noted. Meplex's applied to coccyx and shoulder. Pillow support on others. Problem: Cardiac:  Goal: Ability to maintain vital signs within normal range will improve  Description: Ability to maintain vital signs within normal range will improve  Outcome: Ongoing   Hypotensive on admission. Pt bp borderline low. Other vitals ok. Continuous fluids. Problem: Respiratory:  Goal: Ability to maintain normal respiratory secretions will improve  Description: Ability to maintain normal respiratory secretions will improve  Outcome: Ongoing   Encourage deep breathing and coughing. Pt bed bound. Problem: ACTIVITY INTOLERANCE/IMPAIRED MOBILITY  Goal: Mobility/activity is maintained at optimum level for patient  Outcome: Ongoing   Pt has hx of stroke from 15 yrs ago. Flaccid to left side from that stroke.

## 2020-12-03 NOTE — PROGRESS NOTES
The patient is a 67-year-old  female with a past medical history of COPD, CVA, GI bleed, and urinary retention, who presents from [facility] for fatigue and hypotension. According to the ED nursing staff, patient was noted to be hypotensive at the [facility]. Patient received 250 mL normal saline at the ECF; however, patient's IV infiltrated and the nursing staff was unable to reinsert a new IV. Patient reports that she has not been drinking enough. Additionally, patient reports that the last time she voided was this morning after waking. While still in ED, patient remained hypotensive despite IV fluid, with map less than 65 at times. Triple-lumen catheter inserted in the ED. IV antibiotics initiated in ED due to concern for HCA pneumonia. Home medications reconciled and additional admission orders entered.

## 2020-12-03 NOTE — ED NOTES
Report given to VICKY Duque from [facility]. Report method by phone   The following was reviewed with receiving RN:   Current vital signs:  BP (!) 131/52   Pulse 69   Temp 98 °F (36.7 °C) (Oral)   Resp 16   Ht 5' (1.524 m)   Wt 115 lb (52.2 kg)   SpO2 96%   BMI 22.46 kg/m²                MEWS Score: 2     Any medication or safety alerts were reviewed. Any pending diagnostics and notifications were also reviewed, as well as any safety concerns or issues, abnormal labs, abnormal imaging, and abnormal assessment findings. Questions were answered.           Dorita Ruelas RN  12/03/20 1169

## 2020-12-03 NOTE — PROGRESS NOTES
Dr rossana florentino notified of consult by phone at [facility] office at 2:45pm

## 2020-12-03 NOTE — ED NOTES
Residents at bedside. Notified that multiple nurses attempted dyer/straight cath without success and have not been able to obtain a urine sample. Notified that patient did have a bowel movement but was unable to tell if patient had voided.       Renee Manrique RN  12/03/20 8276

## 2020-12-03 NOTE — ED NOTES
Patients' brief saturated with urine. Patient cleaned and placed in new brief.      Adore Grover RN  12/03/20 3925

## 2020-12-03 NOTE — ED NOTES
attempted to insert dyer catheter without success. Dr. Juliana Martinez notified. Per Dr. Juliana Martinez patient okay to not have dyer at this time.       Aung Holt RN  12/03/20 8567

## 2020-12-03 NOTE — ED NOTES
Patient yelling out randomly in pain. When writer asks patient states that every time she moves her body she has pain. CVC and peripheral IV site assessed and no s/s of infiltration noted.       Sandra Chu RN  12/03/20 9644

## 2020-12-03 NOTE — ED NOTES
Writer spoke with Pool Chakraborty, nurse from [employer] who states that patient yelling out in pain is normal for her. Nurse states that patient takes pills whole without difficulty and eat a regular diet.       Aung Holt RN  12/03/20 3059

## 2020-12-03 NOTE — ED NOTES
Patients brief checked, dry at this time. Patient repositioned in bed, laying on left side.       Mandy Adhikari RN  12/03/20 8724

## 2020-12-03 NOTE — ED PROVIDER NOTES
EMERGENCY DEPARTMENT ENCOUNTER    Pt Name: Redd Woodward  MRN: [id number]  Birthdate 1950  Date of evaluation: 12/3/20  CHIEF COMPLAINT       Chief Complaint   Patient presents with    Fatigue     HISTORY OF PRESENT ILLNESS   HPI     This is a 56-year-old female with a history of CVA she is a nursing home patient who comes in today with low blood pressure history is unable to be completely obtained secondary to the patient's baseline mental status. Per nursing home report the patient had a low blood pressure they did order her some fluids she received 250 cc bolus however they were unable to further obtain access as the IV infiltrated and they are requesting IV fluids.   She has not been complaining of anything she has not been vomiting there have been positive coronavirus test and nursing home facility    REVIEW OF SYSTEMS     Review of Systems unable to be obtained secondary to patient's baseline mental status  PASTMEDICAL HISTORY     Past Medical History:   Diagnosis Date    Abnormal computed tomography of cervical spine     sclerotic bone appearance     CVA (cerebral vascular accident) ([address]     left  side weakness    GERD (gastroesophageal reflux disease)     Hypertension     Paraproteinemia     Weight loss      SURGICAL HISTORY       Past Surgical History:   Procedure Laterality Date    ABDOMEN SURGERY N/A 5/25/2019    ABDOMEN DEBRIDEMENT WOUND CLOSURE REOPENING OF RECENT LAPOROATOMY, ABDOMINAL WASHOUT, REPAIR FASCIAL DEHISENCE WITH MESH performed by Duke Qiu DO at [facility] 5/15/2019    CHOLECYSTECTOMY performed by Duke Qiu DO at [facility] 5/25/2019    GASTROSTOMY TUBE PLACEMENT performed by Duke Qiu DO at [facility] [address] [place] [address] N/A 7/27/2019    EGD ESOPHAGOGASTRODUODENOSCOPY WITH REMOVAL OF PEG TUBE performed by Duke Qiu DO at [address] [place] [address]  4/14/2019         LAPAROSCOPY N/A 5/15/2019 4 MG TABLET    Take 4 mg by mouth every 6 hours as needed for Nausea or Vomiting    SERTRALINE (ZOLOFT) 25 MG TABLET    Take 50 mg by mouth daily    TRAZODONE (DESYREL) 100 MG TABLET    Take 100 mg by mouth nightly For insomnia     ALLERGIES     is allergic to penicillins and amoxicillin. FAMILY HISTORY     has no family status information on file. SOCIAL HISTORY       Social History     Tobacco Use    Smoking status: Former Smoker     Packs/day: 1.00     Years: 30.00     Pack years: 30.00    Smokeless tobacco: Never Used   Substance Use Topics    Alcohol use: Not Currently     Alcohol/week: 28.0 standard drinks     Types: 28 Glasses of wine per week    Drug use: No     PHYSICAL EXAM     INITIAL VITALS: BP (!) 95/34   Pulse 60   Temp 98 °F (36.7 °C) (Oral)   Resp 19   Ht 5' (1.524 m)   Wt 115 lb (52.2 kg)   SpO2 100%   BMI 22.46 kg/m²    Physical Exam  Vitals signs and nursing note reviewed. Constitutional:       General: She is not in acute distress. Appearance: She is well-developed. HENT:      Head: Normocephalic and atraumatic. Eyes:      Conjunctiva/sclera: Conjunctivae normal.   Neck:      Musculoskeletal: Neck supple. Cardiovascular:      Rate and Rhythm: Normal rate and regular rhythm. Heart sounds: No murmur. No friction rub. Pulmonary:      Effort: Pulmonary effort is normal. No respiratory distress. Breath sounds: Normal breath sounds. No stridor. No wheezing or rhonchi. Abdominal:      Comments: Global abdominal tenderness to palpation mild guarding no rebound mild abdominal distention   Skin:     General: Skin is warm and dry. Capillary Refill: Capillary refill takes less than 2 seconds. Neurological:      Mental Status: She is alert. DIAGNOSTIC RESULTS   RADIOLOGY:All plain film, CT, MRI, and formal ultrasound images (except ED bedside ultrasound) are read by the radiologist, see reports below, unless otherwisenoted in MDM or here.   CT ABDOMEN PELVIS W IV CONTRAST Additional Contrast? None   Final Result   Fluid throughout the colon suggests enteritis. No bowel obstruction. Postsurgical changes of the bowel. Partially visualized left lower lobe heterogeneous consolidation with trace   left basilar pleural fluid and pleural thickening. There is also a cavitary   4.3 cm mass in the right lower lobe which demonstrates a small air-fluid   level. Findings may relate to infection. Underlying neoplastic process is   not excluded. Short-term follow-up chest CT may be helpful for further   evaluation. XR CHEST PORTABLE   Final Result   Left greater than right basilar heterogeneous opacities. Differential   considerations include atelectasis/scarring, asymmetric pulmonary edema and   an infectious/inflammatory process. Follow-up PA and lateral chest   radiographs or chest CT may be helpful for further evaluation as warranted. Small bilateral pleural effusions versus pleural thickening. Low left lung volume with leftward shift of the mediastinal structures. Hyperexpanded right lung volume. XR CHEST PORTABLE    (Results Pending)     LABS: All lab results were reviewed by myself, and all abnormals are listed below.   Labs Reviewed   CBC WITH AUTO DIFFERENTIAL - Abnormal; Notable for the following components:       Result Value    WBC 11.5 (*)     RBC 3.43 (*)     Hemoglobin 10.2 (*)     Hematocrit 31.7 (*)     RDW 15.3 (*)     Seg Neutrophils 77 (*)     Lymphocytes 13 (*)     All other components within normal limits   BASIC METABOLIC PANEL - Abnormal; Notable for the following components:    Glucose 105 (*)     BUN 24 (*)     Sodium 131 (*)     Chloride 92 (*)     All other components within normal limits   LIPASE - Abnormal; Notable for the following components:    Lipase 11 (*)     All other components within normal limits   HEPATIC FUNCTION PANEL - Abnormal; Notable for the following components:    Alb 2.3 (*)     Total Protein 5.8 (*)     All other components within normal limits   TROPONIN - Abnormal; Notable for the following components:    Troponin, High Sensitivity 27 (*)     All other components within normal limits   BRAIN NATRIURETIC PEPTIDE - Abnormal; Notable for the following components:    Pro- (*)     All other components within normal limits   PROTIME-INR - Abnormal; Notable for the following components:    Protime 15.8 (*)     All other components within normal limits   CULTURE, BLOOD 1   CULTURE, BLOOD 1   C DIFF TOXIN/ANTIGEN   MAGNESIUM   LACTIC ACID   APTT   COVID-19   LACTIC ACID   TROPONIN   URINALYSIS       EMERGENCY DEPARTMENTCOURSE:       Differential diagnosis includes appendicitis cholecystitis diverticulitis mesenteric ischemia, urinary tract infection coronavirus volume depletion electrolyte abnormality anemia    4:04 AM EST  There is no lactic elevation. Patient has hyponatremia with a sodium 131 and hypochloremia with a chloride of 92 no anion gap elevation no elevation in creatinine. BNP is mildly elevated troponin is also elevated  we will continue to monitor she has no elevation in LFTs or lipase she does have a leukocytosis of 11.5 and a chronic anemia with a hemoglobin 10.2. Covid test was negative. CT scan reveals left greater than right heterogenous opacities we will treat her for hospital-acquired pneumonia with vancomycin and cefepime. CT abdomen does reveal enteritis without any bowel obstruction or mesenteric ischemia. Does reveal pleural effusion fluid and pleural thickening with a cavitary mass which could be infectious or malignant. 5:05 AM EST  Patient still hypotensive with a MAP less than 65 despite fluid resuscitation. I had a goals of care discussion with the patient and she is a full code. A right IJ central line was placed and levophed has been ordered. Patient will be admitted for further management. I spoke to Hanna Ca NP who will admit the patient. EKG: All EKG's are interpreted by the Emergency Department Physician who either signs or Co-signs this chart in the absence of a cardiologist.  Atrial paced rhythm at the heart rate of 60 prolonged AV conduction NV interval of 238 ms left axis deviation no obvious acute ST or T wave changes different from previous EKG    CRITICAL CARE:   CRITICAL CARE: There was a high probability of clinically significant/life threatening deterioration in this patient's condition which required my urgent intervention. Total critical care time was 30 minutes. This excludes any time for separately reportable procedures. PROCEDURES:    Central Line    Date/Time: 12/3/2020 5:06 AM  Performed by: Sherif Pizano MD  Authorized by: Sherif Pizano MD     Consent:     Consent obtained:  Verbal    Consent given by:  Patient    Risks discussed:  Arterial puncture, incorrect placement, bleeding, infection and pneumothorax  Pre-procedure details:     Hand hygiene: Hand hygiene performed prior to insertion      Sterile barrier technique: All elements of maximal sterile technique followed      Skin preparation:  2% chlorhexidine    Skin preparation agent: Skin preparation agent completely dried prior to procedure    Anesthesia (see MAR for exact dosages): Anesthesia method:  Local infiltration    Local anesthetic:  Lidocaine 1% WITH epi  Procedure details:     Location:  R internal jugular    Patient position:  Trendelenburg    Procedural supplies:  Triple lumen    Ultrasound guidance: yes      Sterile ultrasound techniques: Sterile gel and sterile probe covers were used      Number of attempts:  2    Successful placement: yes    Post-procedure details:     Post-procedure:  Dressing applied and line sutured    Assessment:  Blood return through all ports, no pneumothorax on x-ray, placement verified by x-ray and free fluid flow    Patient tolerance of procedure:   Tolerated well, no immediate complications        CONSULTS:  PHARMACY TO DOSE VANCOMYCIN    Vitals:    Vitals:    12/03/20 0122 12/03/20 0200   BP: 92/69 (!) 95/34   Pulse: 61 60   Resp: 17 19   Temp: 98 °F (36.7 °C)    TempSrc: Oral    SpO2: 100%    Weight: 115 lb (52.2 kg)    Height: 5' (1.524 m)        The patient was given the following medications while in the emergency department:  Orders Placed This Encounter   Medications    0.9 % sodium chloride bolus    0.9 % sodium chloride bolus    sodium chloride flush 0.9 % injection 10 mL    iopamidol (ISOVUE-370) 76 % injection 75 mL    cefepime (MAXIPIME) 2 g IVPB minibag     Order Specific Question:   Antimicrobial Indications     Answer:   Pneumonia (HAP)    vancomycin (VANCOCIN) intermittent dosing (placeholder)     Order Specific Question:   Antimicrobial Indications     Answer:   Pneumonia (HAP)    FOLLOWED BY Linked Order Group     vancomycin (VANCOCIN) 1,250 mg in dextrose 5 % 250 mL IVPB (ADDAVIAL)      Order Specific Question:   Antimicrobial Indications      Answer:   Pneumonia (HAP)     vancomycin 1000 mg IVPB in 250 mL D5W addavial      Order Specific Question:   Antimicrobial Indications      Answer:   Pneumonia (HAP)    0.9 % sodium chloride bolus    lactated ringers infusion    norepinephrine (LEVOPHED) 16 mg in sodium chloride 0.9 % 250 mL infusion    0.9 % sodium chloride bolus         FINAL IMPRESSION      1. Hypotension, unspecified hypotension type    2.  Pneumonia due to organism         DISPOSITION/PLAN   DISPOSITION Decision To Admit 12/03/2020 04:32:26 AM    Kimo Arrieta MD  Attending Emergency Physician    This charting supersedes any ED resident or staff charting and was written using speech recognition Lanae Gowers, MD  12/03/20 [id number]

## 2020-12-03 NOTE — PROGRESS NOTES
Pt noted to have multiple pressure points and abrasions. Blanchable pressure point on the right heel, non blanchable pressure point on the left heel. Black wound on top of 3nd toe, left foot. Abrasion and bruising to left shoulder and down left side. Pt denies falling. Stage 2 wound on coccyx. Pillow elevated heels and pillow between knees. meplex on coccyx and left shoulder. Pt has very dry, coated mouth. Provided pt with water and lemon glycerin swabs. Pt has no movement to left side d/t previous stroke. Pt says about 15 years ago.

## 2020-12-03 NOTE — H&P
[address]      [address] [facility]     HISTORY AND PHYSICAL EXAMINATION            Date:   12/3/2020  Patient name:  Elise Newman  Date of admission:  12/3/2020  1:20 AM  MRN:   [id number]  Account:  [de-identified]  YOB: 1950  PCP:    Usman Quarles MD  Room:   F/F  Code Status:    Prior    Chief Complaint:     Chief Complaint   Patient presents with    Fatigue       History Obtained From:     Patient, EMR review    History of Present Illness: The patient is a 65 y.o. Non-/non  female who presents withFatigue   and she is admitted to the hospital for the management of hypotension and HCAP. According to ED nursing staff, patient noted to be hypotensive at her ECF. She was given 250mL normal saline, but it did not significantly raise her BP. Her IV was infiltrated, so they were unable to give her more IV fluids. While in the ED, her MAP was less than 65 at times. She received levophed 4mcg/kg and on 100 NS fluid. It raised her BP and she is being titrated down. Currently, she is at 2mcg/kg levophed and BP holding stable. In the ER, patient reports she has decreased her consumption of food and drinks. She reports \"not feeling well\" but unable to specify what is causing her discomfort. She is coughing, but denies SOB, chest pain, headache, abdominal pain, LE swelling, fullness of abdomen. Nursing staff have difficulty in placing in a dyer/cath. Bladder scan shows 334ml urinary retention. Her WBC is elevated at 12.6, 11.5. CXR shows bilateral basilar airspace disease with left basilar consolidation and right lateral basilar masslike opacity. CT abdomen pelvis shows left lower lobe heterogenous consolidation with trace left basilar pleural fluid and pleural thickening.  4.3 cm mass in right lower lobe demonstrating small air-fluid level, doxycycline hyclate (VIBRA-TABS) 100 MG tablet Take 100 mg by mouth 2 times daily 12/2/20 12/9/20 Yes Historical Provider, MD   HYDROcodone-acetaminophen (NORCO) 5-325 MG per tablet Take 1 tablet by mouth every 6 hours as needed for Pain. Yes Historical Provider, MD   dextromethorphan-quiNIDine (NUEDEXTA) 20-10 MG CAPS per capsule Take 1 capsule by mouth every 12 hours Give 1 capule by mouth every 12 hours for pseudobulbar affect   Yes Historical Provider, MD   traZODone (DESYREL) 100 MG tablet Take 100 mg by mouth nightly For insomnia   Yes Historical Provider, MD   divalproex (DEPAKOTE) 125 MG DR tablet Take 125 mg by mouth 2 times daily   Yes Historical Provider, MD   ipratropium-albuterol (DUONEB) 0.5-2.5 (3) MG/3ML SOLN nebulizer solution Inhale 1 vial into the lungs every 8 hours as needed for Shortness of Breath   Yes Historical Provider, MD   furosemide (LASIX) 20 MG tablet Take 20 mg by mouth daily    Yes Historical Provider, MD   mirtazapine (REMERON) 7.5 MG tablet Take 7.5 mg by mouth nightly   Yes Historical Provider, MD   ondansetron (ZOFRAN) 4 MG tablet Take 4 mg by mouth every 6 hours as needed for Nausea or Vomiting   Yes Historical Provider, MD   sertraline (ZOLOFT) 25 MG tablet Take 50 mg by mouth daily   Yes Historical Provider, MD   amiodarone (PACERONE) 100 MG tablet Take 1 tablet by mouth 2 times daily 5/31/19  Yes Bob Hernandez MD   metoprolol tartrate (LOPRESSOR) 50 MG tablet Take 1 tablet by mouth 2 times daily 5/31/19  Yes Bob Hernandez MD   levothyroxine (SYNTHROID) 75 MCG tablet Take 1 tablet by mouth Daily  Patient taking differently: Take 125 mcg by mouth Daily  6/1/19  Yes Bob Hernandez MD   budesonide-formoterol (SYMBICORT) 160-4.5 MCG/ACT AERO Inhale 2 puffs into the lungs 2 times daily   Yes Historical Provider, MD   Misc. Devices [facility] [address] [place] wheelchair with left fupper extremity support  Dx: stroke with left hemiparesis.  7/24/17  Yes Concepción Chu MD Allergies:     Penicillins and Amoxicillin    Social History:     Tobacco:    reports that she has quit smoking. She has a 30.00 pack-year smoking history. She has never used smokeless tobacco.  Alcohol:      reports previous alcohol use of about 28.0 standard drinks of alcohol per week. Drug Use:  reports no history of drug use. Family History:     History reviewed. No pertinent family history. Review of Systems:     Positive and Negative as described in HPI. Review of Systems   Constitutional: Positive for appetite change. Negative for activity change, chills and fever. Decrease food and drink consumption   HENT: Negative for congestion. Respiratory: Positive for cough and shortness of breath. Negative for chest tightness and wheezing. Cardiovascular: Negative for chest pain, palpitations and leg swelling. Gastrointestinal: Negative for abdominal pain, constipation, diarrhea and nausea. Genitourinary: Negative for difficulty urinating. Urinary retention   Musculoskeletal: Negative for back pain and joint swelling. Skin: Negative for rash. Neurological: Negative for dizziness, weakness and headaches. Psychiatric/Behavioral: Negative for agitation and behavioral problems. Physical Exam:   BP (!) 140/68   Pulse 70   Temp 98 °F (36.7 °C) (Oral)   Resp 15   Ht 5' (1.524 m)   Wt 115 lb (52.2 kg)   SpO2 94%   BMI 22.46 kg/m²   Temp (24hrs), Av °F (36.7 °C), Min:98 °F (36.7 °C), Max:98 °F (36.7 °C)    No results for input(s): POCGLU in the last 72 hours. No intake or output data in the 24 hours ending 20 1026    Physical Exam  Vitals signs reviewed. Constitutional:       General: She is not in acute distress. Appearance: She is normal weight. HENT:      Head: Normocephalic. Mouth/Throat:      Mouth: Mucous membranes are moist.   Cardiovascular:      Rate and Rhythm: Normal rate and regular rhythm. Heart sounds: Normal heart sounds.  No CREATININE 0.63 0.50 - 0.90 mg/dL    Bun/Cre Ratio NOT REPORTED 9 - 20    Calcium 8.8 8.6 - 10.4 mg/dL    Sodium 131 (L) 135 - 144 mmol/L    Potassium 3.9 3.7 - 5.3 mmol/L    Chloride 92 (L) 98 - 107 mmol/L    CO2 27 20 - 31 mmol/L    Anion Gap 12 9 - 17 mmol/L    GFR Non-African American >60 >60 mL/min    GFR African American >60 >60 mL/min    GFR Comment          GFR Staging NOT REPORTED    Magnesium    Collection Time: 12/03/20  1:47 AM   Result Value Ref Range    Magnesium 1.8 1.6 - 2.6 mg/dL   Lipase    Collection Time: 12/03/20  1:47 AM   Result Value Ref Range    Lipase 11 (L) 13 - 60 U/L   Liver Profile    Collection Time: 12/03/20  1:47 AM   Result Value Ref Range    Alb 2.3 (L) 3.5 - 5.2 g/dL    Alkaline Phosphatase 68 35 - 104 U/L    ALT 8 5 - 33 U/L    AST 11 <32 U/L    Total Bilirubin 0.31 0.3 - 1.2 mg/dL    Bilirubin, Direct 0.13 <0.31 mg/dL    Bilirubin, Indirect 0.18 0.00 - 1.00 mg/dL    Total Protein 5.8 (L) 6.4 - 8.3 g/dL    Globulin NOT REPORTED 1.5 - 3.8 g/dL    Albumin/Globulin Ratio NOT REPORTED 1.0 - 2.5   Troponin    Collection Time: 12/03/20  1:47 AM   Result Value Ref Range    Troponin, High Sensitivity 27 (H) 0 - 14 ng/L    Troponin T NOT REPORTED <0.03 ng/mL    Troponin Interp NOT REPORTED    Brain Natriuretic Peptide    Collection Time: 12/03/20  1:47 AM   Result Value Ref Range    Pro- (H) <300 pg/mL    BNP Interpretation Pro-BNP Reference Range:    Protime-INR    Collection Time: 12/03/20  1:47 AM   Result Value Ref Range    Protime 15.8 (H) 11.8 - 14.6 sec    INR 1.3    APTT    Collection Time: 12/03/20  1:47 AM   Result Value Ref Range    PTT 34.0 24.0 - 36.0 sec   Lactic Acid    Collection Time: 12/03/20  1:48 AM   Result Value Ref Range    Lactic Acid 1.3 0.5 - 2.2 mmol/L   EKG 12 Lead    Collection Time: 12/03/20  1:51 AM   Result Value Ref Range    Ventricular Rate 60 BPM    Atrial Rate 60 BPM    P-R Interval 238 ms    QRS Duration 120 ms    Q-T Interval 448 ms    QTc Calculation ([place]) 448 ms    P Axis 98 degrees    R Axis -29 degrees    T Axis 89 degrees   COVID-19    Collection Time: 12/03/20  2:30 AM    Specimen: Other   Result Value Ref Range    SARS-CoV-2          SARS-CoV-2, Rapid Not Detected Not Detected    Source . NASOPHARYNGEAL SWAB     SARS-CoV-2         Troponin    Collection Time: 12/03/20  5:40 AM   Result Value Ref Range    Troponin, High Sensitivity 26 (H) 0 - 14 ng/L    Troponin T NOT REPORTED <0.03 ng/mL    Troponin Interp NOT REPORTED    SPECIMEN REJECTION    Collection Time: 12/03/20  5:40 AM   Result Value Ref Range    Specimen Source . FECES     Ordered Test CDIFQ     Reason for Rejection       Unable to perform testing: Specimen quantity not sufficient.    - NOT REPORTED        Imaging/Diagnostics:  Ct Abdomen Pelvis W Iv Contrast Additional Contrast? None    Result Date: 12/3/2020  EXAMINATION: CT OF THE ABDOMEN AND PELVIS WITH CONTRAST 12/3/2020 2:49 am TECHNIQUE: CT of the abdomen and pelvis was performed with the administration of intravenous contrast. Multiplanar reformatted images are provided for review. Dose modulation, iterative reconstruction, and/or weight based adjustment of the mA/kV was utilized to reduce the radiation dose to as low as reasonably achievable. COMPARISON: August 13, 2019. HISTORY: ORDERING SYSTEM PROVIDED HISTORY: global abdominal pain TECHNOLOGIST PROVIDED HISTORY: global abdominal pain Reason for Exam: Fatigue, abd pain. Due to patient condition, unable to have patient lay flat  or bring arms above head Acuity: Acute Type of Exam: Initial Relevant Medical/Surgical History: History, Gtube/peg tube placement, cholecystectomy FINDINGS: Lower Chest: Partially visualized left lower lobe heterogeneous consolidation. Trace left pleural fluid with pleural thickening. Right lower lobe 4.2 x 3.3 cm masslike opacity with central necrosis, central air-fluid level and mild surrounding heterogeneous/nodular opacities. Emphysema. Partially visualized cardiac pacer leads. Atherosclerosis of the visualized thoracic aorta. Liver: Few scattered subcentimeter hypodensities in the liver are similar to the prior study and likely represent benign cysts. No new suspicious liver mass. Smooth liver contour. Gallbladder and Bile Ducts: Prior cholecystectomy. Spleen: Normal. Adrenal Glands: Normal. Pancreas: Normal. Genitourinary: Normal. Bowel: The stomach is incompletely distended and not well evaluated. Postsurgical changes of the bowel. Normal caliber bowel. Fluid throughout the colon. Vasculature: Atherosclerosis. No abdominal aortic aneurysm. Patent main portal vein. Bones and Soft Tissues: Osteopenia. Degenerative changes in the visualized spine and pelvis. L3 superior endplate 86-93% height loss was not present on the prior study but appears to be chronic. Retroperitoneum/Mesentery: No intraperitoneal free air, ascites or fluid collection. No lymphadenopathy in the abdomen or pelvis. Fluid throughout the colon suggests enteritis. No bowel obstruction. Postsurgical changes of the bowel. Partially visualized left lower lobe heterogeneous consolidation with trace left basilar pleural fluid and pleural thickening. There is also a cavitary 4.3 cm mass in the right lower lobe which demonstrates a small air-fluid level. Findings may relate to infection. Underlying neoplastic process is not excluded. Short-term follow-up chest CT may be helpful for further evaluation. Xr Chest Portable    Result Date: 12/3/2020  EXAMINATION: ONE XRAY VIEW OF THE CHEST 12/3/2020 5:17 am COMPARISON: Earlier same day. HISTORY: ORDERING SYSTEM PROVIDED HISTORY: post RIJ central line TECHNOLOGIST PROVIDED HISTORY: post RIJ central line Reason for Exam: Right central line placement. Acuity: Acute Type of Exam: Initial Additional signs and symptoms: Right central line placement.  FINDINGS: Interval placement of a right IJ central venous catheter with tip in the mid SVC. No pneumothorax. Redemonstration of low left lung volume and hyperexpanded right lung volume. Similar appearing left greater than right basilar airspace disease with left basilar consolidation and right lateral basilar masslike opacity. Small left pleural effusion. Stable left pectoral trans venous dual-chamber cardiac pacer device. Stable cardiomediastinal silhouette and great vessels. Interval placement of a right IJ central venous catheter with tip in the mid SVC. No pneumothorax. Otherwise, no significant interval change with redemonstration of bilateral basilar airspace disease with left basilar consolidation and right lateral basilar masslike opacity. Continued attention on follow-up recommended. Small left pleural effusion. Xr Chest Portable    Result Date: 12/3/2020  EXAMINATION: ONE XRAY VIEW OF THE CHEST 12/3/2020 2:20 am COMPARISON: May 29, 2019. HISTORY: ORDERING SYSTEM PROVIDED HISTORY: weakness TECHNOLOGIST PROVIDED HISTORY: weakness Reason for Exam: Pt c/o weakness, low blood pressure. Acuity: Acute Type of Exam: Initial Additional signs and symptoms: Pt c/o weakness, low blood pressure. FINDINGS: Frontal portable view of the chest.  Low left lung volume. Hyperexpanded right lung volume. Leftward shift of the mediastinal structures. Left greater than right basilar heterogeneous opacities. Small bilateral pleural effusions versus pleural thickening. No pneumothorax. Atherosclerotic thoracic aorta. No significant cardiomegaly. Left pectoral trans venous dual-chamber cardiac pacer device. Osteopenia. Multilevel degenerative disc disease. Left greater than right basilar heterogeneous opacities. Differential considerations include atelectasis/scarring, asymmetric pulmonary edema and an infectious/inflammatory process. Follow-up PA and lateral chest radiographs or chest CT may be helpful for further evaluation as warranted.  Small bilateral pleural effusions versus pleural thickening. Low left lung volume with leftward shift of the mediastinal structures. Hyperexpanded right lung volume. Assessment :      Primary Problem  Hypotension    Active Hospital Problems    Diagnosis Date Noted    Hypotension [I95.9] 12/03/2020    Chronic obstructive pulmonary disease, unspecified ([facility] [address] [J44.9] 05/24/2019    Urinary retention [R33.9]     CVA (cerebral vascular accident) ([facility] [address] [I63.9]        Plan:     Patient status Admit as inpatient in the  Progressive Unit/Step down    Hospital-acquired pneumonia   -In ER, WBC 12.6, 11.5  -Resides in an ECF  -CXR shows bilateral basilar airspace disease with left basilar consolidation and right lateral basilar masslike opacity  -CT abdomen pelvis shows left lower lobe heterogeneous consolidation with trace left basilar pleural fluid and pleural thickening. 4.3 cm mass in right lower lobe demonstrating small air-fluid level, possibly infection or neoplastic process.  -Continue IV vancomycin and cefepime  -Order viral panel, Legionella, mycoplasma, MRSA swab  -Order blood culture, sputum culture  -Order pro-calcitonin, CRP    Hypotension  -Present to ER with MAP<65 despite 250cc NS bolus at [facility] and IV fluid in ER  -100mL/hr NS fluid  -Placed Levophed 4mcg/min. Titrated down to 2mcg/min and BP stable    Urinary retention  -Bladder scan shows 334 mL of urine  -Multiple unsuccessful attempts in Tran/straight cath  -Consult urology    Chronic diastolic CHF with reduced ejection fraction  -Echo (4/2019) shows LVEF 45%.   Evidence of diastolic dysfunction  -Lasix 20mg daily, HELD    COPD  -Continue home medication Symbicort BID daily and DuoNeb PRN      Consultations:   PHARMACY TO DOSE VANCOMYCIN    Patient is admitted as inpatient status because of co-morbiditieslisted above, severity of signs and symptoms as outlined, requirement for current medical therapies and most importantly because of direct risk to patient if care not provided in a hospital setting. Jim Allen MD  12/3/2020  10:26 AM    Copy sent to Dr. Stu Pandey MD  Attending Physician Statement  I have discussed the care of [address] and I have examined the patient myselft and taken ros and hpi , including pertinent history and exam findings,  with the resident. I have reviewed the key elements of all parts of the encounter with the resident. I agree with the assessment, plan and orders as documented by the resident.     hcap  Iv abx  Cortisol levels  Off levophed  Seen in er  With resident  consut dr Roselia Colindres mass noted necrotizing infection vs malignancy      Electronically signed by Larry Spaulding MD

## 2020-12-03 NOTE — ED NOTES
Dr. Martinez Copas at bedside preparing for central line placement.       Erin Carroll RN  12/03/20 [id number]

## 2020-12-03 NOTE — PROGRESS NOTES
Pharmacy Note  Vancomycin Consult    Elizabeth aMyes is a 65 y.o. female started on Vancomycin for pneumonia; consult received from Dr. Codey Duran to manage therapy. Also receiving the following antibiotics: cefepime. Patient Active Problem List   Diagnosis    Paraproteinemia    CVA (cerebral vascular accident) ([facility] [address]    Abnormal computed tomography of cervical spine    Weight loss    Functional gait abnormality    Left knee pain    Knee pain, left    Acute pain of left knee    Sepsis due to urinary tract infection (HCC)    Cystitis    Emphysematous cystitis    Septic shock (HCC)    Leukemoid reaction    Aspiration pneumonia of right lower lobe due to vomit (HCC)    MRSA carrier    Urinary retention    Abdominal distention    Elevated CEA    Elevated CA 19-9 level    Cholecystitis    CRP elevated    Elevated procalcitonin    Bandemia    Centrilobular emphysema (HCC)    Hemorrhage of rectum and anus    GI bleed    Anemia    Small bowel obstruction (HCC)    Anxiety disorder    Noncompliance    Acute respiratory failure (HCC)    Chronic obstructive pulmonary disease, unspecified (HCC)    Bacteremia due to coagulase-negative Staphylococcus    Bradycardia    Fever    Abdominal wall abscess at site of surgical wound       Allergies:  Penicillins and Amoxicillin     Temp max: 98    Recent Labs     12/02/20  0945 12/03/20  0147   BUN 21 24*       Recent Labs     12/02/20  0945 12/03/20  0147   CREATININE 0.58 0.63       Recent Labs     12/02/20  0945 12/03/20  0147   WBC 12.6* 11.5*       No intake or output data in the 24 hours ending 12/03/20 0516    Culture Date      Source                       Results  See micro    Ht Readings from Last 1 Encounters:   12/03/20 5' (1.524 m)        Wt Readings from Last 1 Encounters:   12/03/20 115 lb (52.2 kg)         Body mass index is 22.46 kg/m². Estimated Creatinine Clearance: 58 mL/min (based on SCr of 0.63 mg/dL).     Goal Trough Level: 15-20 mcg/mL    Assessment/Plan:  Will initiate Vancomycin with a one time loading dose of 1250 mg x1, followed by 1000 mg IV every 24 hours. Timing of trough level will be determined based on culture results, renal function, and clinical response. Thank you for the consult. Will continue to follow.     Yee Paris RPh     -12/3/2020 at 5:19 AM

## 2020-12-04 PROBLEM — T85.79XA LINE SEPSIS (HCC): Status: ACTIVE | Noted: 2020-01-01

## 2020-12-04 PROBLEM — A41.9 LINE SEPSIS (HCC): Status: ACTIVE | Noted: 2020-01-01

## 2020-12-04 NOTE — PROGRESS NOTES
[facility]    PROGRESS NOTE             12/4/2020    7:56 AM    Name:   Alpesh Zazueta  MRN:     [id number]     Acct:      [de-identified]   Room:   2098/2098-01   Day:  1  Admit Date:  12/3/2020  1:20 AM    PCP:  Kiley Chen MD  Code Status:  Full Code    Subjective:     C/C:   Chief Complaint   Patient presents with    Fatigue     Interval History Status:    Patient was seen and evaluated by bedside. No acute events overnight. Patient was alert and oriented x3. Denies chest pain, shortness of breath, abdominal pain. Patient's vitals stable, patient is afebrile overnight. Low grade fever 99.7 F this morning. Blood pressure at 144/58, patient is off Levophed. Patient is currently on IV cefepime and vancomycin, day 2 for hospital-acquired pneumonia. CRP elevated at 156.6. Consulted pulmonology and urology, appreciate further recommendations. Per RN, patient drinks minimum fluids and decreased appetite. Labs were not collected due to patient's refusal, labs reordered for this morning. Central line still in, placed in the ED yesterday. Will reassess and possible removal today. Brief History:     Please review H&P    Review of Systems:     Review of Systems   Constitutional: Positive for appetite change. Negative for activity change, chills and fever. Decrease food and drink consumption   HENT: Negative for congestion. Respiratory: Positive for cough and shortness of breath. Negative for chest tightness and wheezing. Cardiovascular: Negative for chest pain, palpitations and leg swelling. Gastrointestinal: Negative for abdominal pain, constipation, diarrhea and nausea. Genitourinary: Negative for difficulty urinating. Urinary retention   Musculoskeletal: Negative for back pain and joint swelling. Skin: Negative for rash. Neurological: Negative for dizziness, weakness and headaches. SYSTEM PROVIDED HISTORY: global abdominal pain TECHNOLOGIST PROVIDED HISTORY: global abdominal pain Reason for Exam: Fatigue, abd pain. Due to patient condition, unable to have patient lay flat  or bring arms above head Acuity: Acute Type of Exam: Initial Relevant Medical/Surgical History: History, Gtube/peg tube placement, cholecystectomy FINDINGS: Lower Chest: Partially visualized left lower lobe heterogeneous consolidation. Trace left pleural fluid with pleural thickening. Right lower lobe 4.2 x 3.3 cm masslike opacity with central necrosis, central air-fluid level and mild surrounding heterogeneous/nodular opacities. Emphysema. Partially visualized cardiac pacer leads. Atherosclerosis of the visualized thoracic aorta. Liver: Few scattered subcentimeter hypodensities in the liver are similar to the prior study and likely represent benign cysts. No new suspicious liver mass. Smooth liver contour. Gallbladder and Bile Ducts: Prior cholecystectomy. Spleen: Normal. Adrenal Glands: Normal. Pancreas: Normal. Genitourinary: Normal. Bowel: The stomach is incompletely distended and not well evaluated. Postsurgical changes of the bowel. Normal caliber bowel. Fluid throughout the colon. Vasculature: Atherosclerosis. No abdominal aortic aneurysm. Patent main portal vein. Bones and Soft Tissues: Osteopenia. Degenerative changes in the visualized spine and pelvis. L3 superior endplate 06-53% height loss was not present on the prior study but appears to be chronic. Retroperitoneum/Mesentery: No intraperitoneal free air, ascites or fluid collection. No lymphadenopathy in the abdomen or pelvis. Fluid throughout the colon suggests enteritis. No bowel obstruction. Postsurgical changes of the bowel. Partially visualized left lower lobe heterogeneous consolidation with trace left basilar pleural fluid and pleural thickening.   There is also a cavitary 4.3 cm mass in the right lower lobe which demonstrates a small air-fluid level. Findings may relate to infection. Underlying neoplastic process is not excluded. Short-term follow-up chest CT may be helpful for further evaluation. Xr Chest Portable    Result Date: 12/3/2020  EXAMINATION: ONE XRAY VIEW OF THE CHEST 12/3/2020 5:17 am COMPARISON: Earlier same day. HISTORY: ORDERING SYSTEM PROVIDED HISTORY: post RIJ central line TECHNOLOGIST PROVIDED HISTORY: post RIJ central line Reason for Exam: Right central line placement. Acuity: Acute Type of Exam: Initial Additional signs and symptoms: Right central line placement. FINDINGS: Interval placement of a right IJ central venous catheter with tip in the mid SVC. No pneumothorax. Redemonstration of low left lung volume and hyperexpanded right lung volume. Similar appearing left greater than right basilar airspace disease with left basilar consolidation and right lateral basilar masslike opacity. Small left pleural effusion. Stable left pectoral trans venous dual-chamber cardiac pacer device. Stable cardiomediastinal silhouette and great vessels. Interval placement of a right IJ central venous catheter with tip in the mid SVC. No pneumothorax. Otherwise, no significant interval change with redemonstration of bilateral basilar airspace disease with left basilar consolidation and right lateral basilar masslike opacity. Continued attention on follow-up recommended. Small left pleural effusion. Xr Chest Portable    Result Date: 12/3/2020  EXAMINATION: ONE XRAY VIEW OF THE CHEST 12/3/2020 2:20 am COMPARISON: May 29, 2019. HISTORY: ORDERING SYSTEM PROVIDED HISTORY: weakness TECHNOLOGIST PROVIDED HISTORY: weakness Reason for Exam: Pt c/o weakness, low blood pressure. Acuity: Acute Type of Exam: Initial Additional signs and symptoms: Pt c/o weakness, low blood pressure. FINDINGS: Frontal portable view of the chest.  Low left lung volume. Hyperexpanded right lung volume. Leftward shift of the mediastinal structures. Left greater than right basilar heterogeneous opacities. Small bilateral pleural effusions versus pleural thickening. No pneumothorax. Atherosclerotic thoracic aorta. No significant cardiomegaly. Left pectoral trans venous dual-chamber cardiac pacer device. Osteopenia. Multilevel degenerative disc disease. Left greater than right basilar heterogeneous opacities. Differential considerations include atelectasis/scarring, asymmetric pulmonary edema and an infectious/inflammatory process. Follow-up PA and lateral chest radiographs or chest CT may be helpful for further evaluation as warranted. Small bilateral pleural effusions versus pleural thickening. Low left lung volume with leftward shift of the mediastinal structures. Hyperexpanded right lung volume. Physical Examination:        Physical Exam  Vitals signs reviewed. Constitutional:       General: She is not in acute distress. Appearance: She is normal weight. Comments: Central line present   HENT:      Head: Normocephalic. Mouth/Throat:      Mouth: Mucous membranes are moist.   Cardiovascular:      Rate and Rhythm: Normal rate and regular rhythm. Heart sounds: Normal heart sounds. No murmur. No gallop. Pulmonary:      Effort: Pulmonary effort is normal. No respiratory distress. Breath sounds: Normal breath sounds. Chest:      Chest wall: No tenderness. Abdominal:      General: Bowel sounds are normal.      Palpations: Abdomen is soft. Tenderness: There is no abdominal tenderness. There is no guarding. Comments: SubQ indwelling. Surgical incision along mid-abdomen   Genitourinary:     Comments: Urinary retention, but excreting urine in external wick  Musculoskeletal:      Right lower leg: No edema. Left lower leg: No edema. Skin:     General: Skin is warm and dry. Neurological:      Mental Status: She is alert and oriented to person, place, and time.          Assessment:        Primary Problem  Hospital-acquired pneumonia    Active Hospital Problems    Diagnosis Date Noted    Hypotension [I95.9] 12/03/2020    Hospital-acquired pneumonia [J18.9, Y95] 12/03/2020    Chronic obstructive pulmonary disease, unspecified ([facility] [address] [J44.9] 05/24/2019    Urinary retention [R33.9]     CVA (cerebral vascular accident) ([facility] [address] [I63.9]        Plan:        Hospital-acquired pneumonia   -In ER, WBC 12.6, 11.5  -Resides in an ECF  -CXR shows bilateral basilar airspace disease with left basilar consolidation and right lateral basilar masslike opacity  -CT abdomen pelvis shows left lower lobe heterogeneous consolidation with trace left basilar pleural fluid and pleural thickening. 4.3 cm mass in right lower lobe demonstrating small air-fluid level, possibly infection or neoplastic process.  -Continue IV vancomycin and cefepime  -Viral panel negative  -Legionella, mycoplasma, MRSA swab, pending results  -Blood culture positive with gram positive cocci in clusters, coagulase negative staphylococcus  -Awaiting Sputum culture  -Pro-calcitonin wnl 0.08, CRP elevated 156.6  -Consulted Pulmonary, appreciate recs     Hypotension  -Present to ER with MAP<65 despite 250cc NS bolus at ECF and IV fluid in ER  -100mL/hr NS fluid  -Levophed discontinued, BP stable  -Triple lumen catheter placed in ER     Urinary retention  -Bladder scan shows 334 mL of urine  -Multiple unsuccessful attempts in Tran/straight cath  -Patient currently urinating in external wick  -Consulted urology, appreciate recs     Chronic diastolic CHF with reduced ejection fraction  -Pro BNP shows 980, lower than past readings  -Echo (4/2019) shows LVEF 45%.   Evidence of diastolic dysfunction  -Lasix 20mg daily, HELD     COPD  -Continue home medication Symbicort BID daily and DuoNeb PRN    Diet: General diet  DVT prophylaxis: Lovenox 40mg injection daily  GI prophylaxis: Not indicated  PT/OT/SW     Disposition: Came from [facility] ([place], [place])    Mandy Santos MD  12/4/2020  7:56

## 2020-12-04 NOTE — PLAN OF CARE
Problem: Falls - Risk of:  Goal: Will remain free from falls  Description: Will remain free from falls  Outcome: Met This Shift  Goal: Absence of physical injury  Description: Absence of physical injury  Outcome: Met This Shift     Problem: Pain:  Goal: Pain level will decrease  Description: Pain level will decrease  Outcome: Met This Shift  Goal: Control of acute pain  Description: Control of acute pain  Outcome: Met This Shift  Goal: Control of chronic pain  Description: Control of chronic pain  Outcome: Met This Shift     Problem: Skin Integrity:  Goal: Will show no infection signs and symptoms  Description: Will show no infection signs and symptoms  Outcome: Met This Shift  Goal: Absence of new skin breakdown  Description: Absence of new skin breakdown  Outcome: Met This Shift  Goal: Demonstration of wound healing without infection will improve  Description: Demonstration of wound healing without infection will improve  Outcome: Met This Shift  Goal: Complications related to intravenous access or infusion will be avoided or minimized  Description: Complications related to intravenous access or infusion will be avoided or minimized  Outcome: Met This Shift     Problem: Cardiac:  Goal: Ability to maintain vital signs within normal range will improve  Description: Ability to maintain vital signs within normal range will improve  Outcome: Met This Shift  Goal: Cardiovascular alteration will improve  Description: Cardiovascular alteration will improve  Outcome: Met This Shift     Problem: Health Behavior:  Goal: Identification of resources available to assist in meeting health care needs will improve  Description: Identification of resources available to assist in meeting health care needs will improve  Outcome: Met This Shift     Problem: Physical Regulation:  Goal: Ability to maintain vital signs within normal range will improve  Description: Ability to maintain vital signs within normal range will improve  Outcome: Met This Shift     Problem: Respiratory:  Goal: Ability to maintain normal respiratory secretions will improve  Description: Ability to maintain normal respiratory secretions will improve  Outcome: Met This Shift     Problem: HEMODYNAMIC STATUS  Goal: Patient has stable vital signs and fluid balance  Outcome: Met This Shift     Problem: ACTIVITY INTOLERANCE/IMPAIRED MOBILITY  Goal: Mobility/activity is maintained at optimum level for patient  Outcome: Met This Shift     Problem: COMMUNICATION IMPAIRMENT  Goal: Ability to express needs and understand communication  Outcome: Met This Shift     Problem: Health Behavior:  Goal: Will modify at least one risk factor affecting health status  Description: Will modify at least one risk factor affecting health status  Outcome: Ongoing     Problem: Physical Regulation:  Goal: Complications related to the disease process, condition or treatment will be avoided or minimized  Description: Complications related to the disease process, condition or treatment will be avoided or minimized  Outcome: Ongoing  Goal: Diagnostic test results will improve  Description: Diagnostic test results will improve  Outcome: Ongoing  Goal: Will remain free from infection  Description: Will remain free from infection  Outcome: Ongoing     Problem: Nutritional:  Goal: Nutritional status will improve  Description: Nutritional status will improve  Outcome: Ongoing

## 2020-12-04 NOTE — PROGRESS NOTES
Comprehensive Nutrition Assessment    Type and Reason for Visit:  Initial, Positive Nutrition Screen(wounds)    Nutrition Recommendations/Plan: Will add Ensure Enlive supplements to all trays to increase protein and calorie intake  If nutrition support becomes necessary, recommend Osmolite at 55 ml per hour to provide 1584 kcal, 73 g protein    Nutrition Assessment:  Pt admited from ECF due to Pneumonia. Discussed wounds with nursing who states pt has some redness at coccyx, heels and an abrasion on her shoulder. Pt refused breakfast and is presently only consuming a very minimal amount of liquids. H/O PEG which has been removed noted. Malnutrition Assessment:  Malnutrition Status: At risk for malnutrition (Comment)    Context:  Chronic Illness     Findings of the 6 clinical characteristics of malnutrition:  Energy Intake:  (pt has not been eating or drinking for the past several days.)  Weight Loss:  Unable to assess     Body Fat Loss:  Unable to assess     Muscle Mass Loss:  Unable to assess    Fluid Accumulation:  No significant fluid accumulation     Strength:  Not Performed    Estimated Daily Nutrient Needs:  Energy (kcal):  25 kcal/xa=4569 kcal; Weight Used for Energy Requirements:  Current     Protein (g):  1.4g/kg=60-65g; Weight Used for Protein Requirements:  Ideal          Nutrition Related Findings:  Comorbids: CVA with L sided weakness, COPD, CHF, PEG has been removed      Wounds:  None(none documented, discussed abrasions/reddened areas with RN)       Current Nutrition Therapies:    DIET GENERAL; Anthropometric Measures:  · Height: 5' (152.4 cm)  · Current Body Weight: 128 lb (58.1 kg)   · Admission Body Weight: 115 lb (52.2 kg)(stated)    · Ideal Body Weight: 100 lbs; BMI: 25  · BMI Categories: Overweight (BMI 25.0-29. 9)       Nutrition Diagnosis:   · Inadequate oral intake related to (poor appetite) as evidenced by intake 0-25%    Nutrition Interventions:   Food and/or Nutrient Delivery: Continue Current Diet, Start Oral Nutrition Supplement  Nutrition Education/Counseling:  Education not indicated   Coordination of Nutrition Care:  Continue to monitor while inpatient    Goals:  po intake greater than 50%       Nutrition Monitoring and Evaluation:   Food/Nutrient Intake Outcomes:  Food and Nutrient Intake, Supplement Intake  Physical Signs/Symptoms Outcomes:  Biochemical Data, Fluid Status or Edema, GI Status, Skin, Weight     Discharge Planning:     Too soon to determine     Electronically signed by Redd Max RD, LD on 12/4/20 at 2:47 PM EST    Contact: [phone]

## 2020-12-04 NOTE — CONSULTS
Current Facility-Administered Medications   Medication Dose Route Frequency Provider Last Rate Last Dose    perflutren lipid microspheres (DEFINITY) injection 2.2 mg  2 mL Intravenous ONCE PRN Christiana Atkins MD        potassium chloride 60 mEq in sodium chloride 0.9 % 500 mL IVPB   Intravenous Once AGAPITO Suero CNP        sodium chloride flush 0.9 % injection 10 mL  10 mL Intravenous PRN Courtney Chakraborty MD   10 mL at 12/03/20 0304    vancomycin (VANCOCIN) intermittent dosing (placeholder)   Other RX Placeholder Courtney Chakraborty MD        vancomycin 1000 mg IVPB in 250 mL D5W addavial  1,000 mg Intravenous Q24H Courtney Chakraborty MD   Stopped at 12/04/20 4788    lactated ringers infusion   Intravenous Continuous Courtney Chakraborty MD   Stopped at 12/03/20 1522    0.9 % sodium chloride bolus  500 mL Intravenous Once Courtney Chakraborty MD        budesonide-formoterol (SYMBICORT) 160-4.5 MCG/ACT inhaler 2 puff  2 puff Inhalation BID AGAPITO Suero CNP   2 puff at 12/04/20 0829    divalproex (DEPAKOTE SPRINKLE) capsule 125 mg  125 mg Oral BID Rubén Garcia APRN - CNP   125 mg at 12/04/20 8955    traZODone (DESYREL) tablet 100 mg  100 mg Oral Nightly Rubén Garcia APRN - CNP   100 mg at 12/03/20 2300    sertraline (ZOLOFT) tablet 50 mg  50 mg Oral Daily AGAPITO Suero - CNP   50 mg at 12/04/20 1260    mirtazapine (REMERON) tablet 7.5 mg  7.5 mg Oral Nightly Rubén Garcia APRN - CNP   7.5 mg at 12/03/20 2300    levothyroxine (SYNTHROID) tablet 125 mcg  125 mcg Oral Daily AGAPITO Suero - CNP   125 mcg at 12/04/20 0537    ipratropium-albuterol (DUONEB) nebulizer solution 3 mL  1 vial Inhalation Q8H PRN AGAPITO Suero CNP        HYDROcodone-acetaminophen (NORCO) 5-325 MG per tablet 1 tablet  1 tablet Oral Q6H PRN AGAPITO Suero CNP   1 tablet at 12/03/20 9439    baclofen (LIORESAL) tablet 10 mg  10 mg Oral BID AGAPITO Suero - CNP   10 mg at 12/04/20 3542    sodium chloride flush 0.9 % injection 10 mL  10 mL Intravenous 2 times per day Glendia Primmer, APRN - CNP   10 mL at 12/04/20 0829    sodium chloride flush 0.9 % injection 10 mL  10 mL Intravenous PRN Glendia Primmer, APRN - CNP        potassium chloride (KLOR-CON M) extended release tablet 40 mEq  40 mEq Oral PRN Glendia Primmer, APRN - CNP        Or    potassium bicarb-citric acid (EFFER-K) effervescent tablet 40 mEq  40 mEq Oral PRN Glendia Primmer, APRN - CNP        Or    potassium chloride 10 mEq/100 mL IVPB (Peripheral Line)  10 mEq Intravenous PRN Glendia Primmer, APRN - CNP        magnesium sulfate 1 g in dextrose 5% 100 mL IVPB  1 g Intravenous PRN Glendia Primmer, APRN - CNP        acetaminophen (TYLENOL) tablet 650 mg  650 mg Oral Q6H PRN Glendia Primmer, APRN - CNP        Or    acetaminophen (TYLENOL) suppository 650 mg  650 mg Rectal Q6H PRN Glendia Primmer, APRN - CNP        polyethylene glycol (GLYCOLAX) packet 17 g  17 g Oral Daily PRN Glendia Primmer, APRN - CNP        promethazine (PHENERGAN) tablet 12.5 mg  12.5 mg Oral Q6H PRN Glendia Primmer, APRN - CNP        Or    ondansetron [facility]) injection 4 mg  4 mg Intravenous Q6H PRN Glendia Primmer, APRN - CNP        enoxaparin (LOVENOX) injection 40 mg  40 mg Subcutaneous Daily Glendia Primmer, APRN - CNP   40 mg at 12/04/20 3858    0.9 % sodium chloride infusion   Intravenous Continuous Glendia Primmer, APRN -  mL/hr at 12/04/20 6436      cefepime (MAXIPIME) 2 g IVPB minibag  2 g Intravenous Q12H Glendia Primmer, APRN - CNP   Stopped at 12/04/20 5864      PULMONARY  CONSULT NOTE      Date of Admission: 12/3/2020  1:20 AM    Reason for Consult: pneumonia HCAP    Referring Physician: Dr Jean Ponce  PCP: Nancy Mendoza MD     History of Present Illness:     52-year-old female with a history of CVA she is a nursing home patient who comes in today with low blood pressure history is unable to be completely obtained secondary to the patient's baseline mental status.   Per nursing home report the patient had a low blood pressure they did order her some fluids she received 250 cc bolus however they were unable to further obtain access as the IV infiltrated and they are requesting IV fluids. She has not been complaining of anything she has not been vomiting there have been positive coronavirus test and nursing home facility    She was on vasopressor for short period, currently off them; CXR suggested pneumonia    Problem:  Principal Problem:     PMH:   Past Medical History:   Diagnosis Date    Abnormal computed tomography of cervical spine     sclerotic bone appearance     CVA (cerebral vascular accident) ([address]     left  side weakness    GERD (gastroesophageal reflux disease)     Hypertension     Paraproteinemia     Weight loss        PSH:   Past Surgical History:   Procedure Laterality Date    ABDOMEN SURGERY N/A 5/25/2019    ABDOMEN DEBRIDEMENT WOUND CLOSURE REOPENING OF RECENT LAPOROATOMY, ABDOMINAL WASHOUT, REPAIR FASCIAL DEHISENCE WITH MESH performed by Marquita Bansal DO at [facility] 5/15/2019    CHOLECYSTECTOMY performed by Marquita Bansal DO at [facility] 5/25/2019    GASTROSTOMY TUBE PLACEMENT performed by Marquita Bansal DO at [facility] [address] [place] [address] N/A 7/27/2019    EGD ESOPHAGOGASTRODUODENOSCOPY WITH REMOVAL OF PEG TUBE performed by Marquita Bansal DO at [address] [place] [address]  4/14/2019         LAPAROSCOPY N/A 5/15/2019    LAPAROSCOPY EXPLORATORY CONVERTED TO EXPLORATORY LAPAROTOMY/ RIGHT COLON RESECTION AND ANASTAMOSIS/ OPEN CHOLECYSTECTOMY/ EXTENSIVE LYSIS OF ADHESIONS performed by Marquita Bansal DO at [place] [address]  07/2011    Pacemaker is Medtronic Revo (compatible). Leads placed in 1995 are NOT MRI compatible. Placed at [facility]. V's per Dr. Nahun Griffith can not have an MRI.     UPPER GASTROINTESTINAL ENDOSCOPY N/A 4/16/2019    EGD ESOPHAGOGASTRODUODENOSCOPY @ [address] 2002 performed by Yanique Serna MD at [address] [place] [address]: Allergies   Allergen Reactions    Penicillins Shortness Of Breath and Rash    Amoxicillin        Home Meds:  Medications Prior to Admission: baclofen (LIORESAL) 10 MG tablet, Take 10 mg by mouth 2 times daily Give 0.5 tablet by mouth two times a day for spasms  doxycycline hyclate (VIBRA-TABS) 100 MG tablet, Take 100 mg by mouth 2 times daily  HYDROcodone-acetaminophen (NORCO) 5-325 MG per tablet, Take 1 tablet by mouth every 6 hours as needed for Pain. dextromethorphan-quiNIDine (NUEDEXTA) 20-10 MG CAPS per capsule, Take 1 capsule by mouth every 12 hours Give 1 capule by mouth every 12 hours for pseudobulbar affect  traZODone (DESYREL) 100 MG tablet, Take 100 mg by mouth nightly For insomnia  divalproex (DEPAKOTE) 125 MG DR tablet, Take 125 mg by mouth 2 times daily  ipratropium-albuterol (DUONEB) 0.5-2.5 (3) MG/3ML SOLN nebulizer solution, Inhale 1 vial into the lungs every 8 hours as needed for Shortness of Breath  furosemide (LASIX) 20 MG tablet, Take 20 mg by mouth daily   mirtazapine (REMERON) 7.5 MG tablet, Take 7.5 mg by mouth nightly  ondansetron (ZOFRAN) 4 MG tablet, Take 4 mg by mouth every 6 hours as needed for Nausea or Vomiting  sertraline (ZOLOFT) 25 MG tablet, Take 50 mg by mouth daily  amiodarone (PACERONE) 100 MG tablet, Take 1 tablet by mouth 2 times daily  metoprolol tartrate (LOPRESSOR) 50 MG tablet, Take 1 tablet by mouth 2 times daily  levothyroxine (SYNTHROID) 75 MCG tablet, Take 1 tablet by mouth Daily (Patient taking differently: Take 125 mcg by mouth Daily )  budesonide-formoterol (SYMBICORT) 160-4.5 MCG/ACT AERO, Inhale 2 puffs into the lungs 2 times daily  Misc. Devices [facility] MISC, Power wheelchair with left fupper extremity support Dx: stroke with left hemiparesis. Social History:   Social History     Socioeconomic History    Marital status:       Spouse name: Not on file    Number of children: Not on file    Years of education: Not on file    Highest education level: Not on file   Occupational History    Not on file   Social Needs    Financial resource strain: Not on file    Food insecurity     Worry: Not on file     Inability: Not on file    Transportation needs     Medical: Not on file     Non-medical: Not on file   Tobacco Use    Smoking status: Former Smoker     Packs/day: 1.00     Years: 30.00     Pack years: 30.00    Smokeless tobacco: Never Used   Substance and Sexual Activity    Alcohol use: Not Currently     Alcohol/week: 28.0 standard drinks     Types: 28 Glasses of wine per week    Drug use: No    Sexual activity: Not on file   Lifestyle    Physical activity     Days per week: Not on file     Minutes per session: Not on file    Stress: Not on file   Relationships    Social connections     Talks on phone: Not on file     Gets together: Not on file     Attends Ca[employer] service: Not on file     Active member of club or organization: Not on file     Attends meetings of clubs or organizations: Not on file     Relationship status: Not on file    Intimate partner violence     Fear of current or ex partner: Not on file     Emotionally abused: Not on file     Physically abused: Not on file     Forced sexual activity: Not on file   Other Topics Concern    Not on file   Social History Narrative    Not on file       Family History: History reviewed. No pertinent family history.     Review of Systems  Fever/ chills - no  Chest pain - no  Cough - +  Expectoration / hemoptysis - no  shortness of breath - minimal  Headache - no  Sinus drainage/ sore throat - no  abdominal pain - no  Swelling feet - no  Nausea/ vomiting/ diarrhea/ constipation - no    Physical Exam  Vital Signs: BP (!) 143/58   Pulse 83   Temp 99.7 °F (37.6 °C) (Oral)   Resp 18   Ht 5' (1.524 m)   Wt 128 lb 15.5 oz (58.5 kg)   SpO2 95%   BMI 25.19 kg/m²       Admission Weight: Weight: 115 lb (52.2 kg)    General Appearance: Healthy, alert, active, cooperative, and in no distress  Head: Normocephalic, without obvious abnormality, atraumatic  Neck: no adenopathy, no JVD, supple, symmetrical, trachea midline\"thyroid not enlarged, symmetric, no tenderness/mass/nodule  Lungs: fair air entry bilaterally; breath sounds- vesicular; rhonchi- absent; rales/ crackles- absent  Heart: : regular rate and rhythm, S1, S2 normal, no murmur, click, rub or gallop  Abdomen: soft, non-tender; bowel sounds normal; no masses,  no organomegaly  Extremities: extremities normal, atraumatic, no cyanosis or edema  Skin: skin color, texture, turgor normal. No rashes or lesions  Neurologic: Grossly normal    [unfilled]    Recent labs, Imaging studies reviewed      Data ReviewCBC:   Recent Labs     12/02/20  0945 12/03/20  0147 12/04/20  0905   WBC 12.6* 11.5* 12.5*   RBC 3.24* 3.43* 3.24*   HGB 9.5* 10.2* 9.7*   HCT 29.8* 31.7* 29.5*    235 251     BMP:   Recent Labs     12/02/20  0945 12/03/20  0147 12/04/20  0905   GLUCOSE 70 105* 84   * 131* 131*   K 3.6* 3.9 2.9*   BUN 21 24* 9   CREATININE 0.58 0.63 <0.40*   CALCIUM 8.6 8.8 8.2*     ABGs: No results for input(s): PHART, PO2ART, OVT1DWC, EIO6RZS, BEART, X4AWJEWJ, JDJ1LHX in the last 72 hours. PT/INR:  No results found for: PTINR      ASSESSMENT / PLAN:    Bibasilar infiltrate - on CT abdomen -? Pneumonia, HCAP  continue ABx  Hypotension - monitor BP  Check CT chest  ??  Mass karen RLL - some suggestion of vague density in same area 1 year ago      Electronically signed by Luann Johnston on 12/04/20 at 10:59 AM.

## 2020-12-04 NOTE — PLAN OF CARE
Nutrition Problem #1: Inadequate oral intake  Intervention: Food and/or Nutrient Delivery: Continue Current Diet, Start Oral Nutrition Supplement  Nutritional Goals: po intake greater than 50%

## 2020-12-04 NOTE — DISCHARGE INSTR - COC
Continuity of Care Form    Patient Name: Jason Chery   :  1950  MRN:  [id number]    Admit date:  12/3/2020  Discharge date:  ***    Code Status Order: Full Code   Advance Directives:   Advance Care Flowsheet Documentation     Date/Time Healthcare Directive Type of Healthcare Directive Copy in [address] Agent's Name Healthcare Agent's Phone Number    20 1535  No, patient does not have an advance directive for healthcare treatment -- -- -- -- --          Admitting Physician:  Gerhardt Lovings, MD  PCP: Esther Segal MD    Discharging Nurse: [facility] Unit/Room#: 2098/2098-01  Discharging Unit Phone Number: ***    Emergency Contact:   Extended Emergency Contact Information  Primary Emergency Contact: Janny [address] Phone: [phone]  Mobile Phone: [phone]  Relation: Brother/Sister  Preferred language: English  Secondary Emergency Contact: Briseida POWELL Phone: [phone]  Mobile Phone: [phone]  Relation: Other    Past Surgical History:  Past Surgical History:   Procedure Laterality Date    ABDOMEN SURGERY N/A 2019    ABDOMEN DEBRIDEMENT WOUND CLOSURE REOPENING OF RECENT LAPOROATOMY, ABDOMINAL WASHOUT, REPAIR FASCIAL DEHISENCE WITH MESH performed by Nirav Moran DO at [facility] 5/15/2019    CHOLECYSTECTOMY performed by Nirav Moran DO at [address] [place] [address] 2019    GASTROSTOMY TUBE PLACEMENT performed by Nirav Moran DO at [address] [place] [address] N/A 2019    EGD ESOPHAGOGASTRODUODENOSCOPY WITH REMOVAL OF PEG TUBE performed by Nirav Moran DO at [address] [place] [address]  2019         LAPAROSCOPY N/A 5/15/2019    LAPAROSCOPY EXPLORATORY CONVERTED TO EXPLORATORY LAPAROTOMY/ RIGHT COLON RESECTION AND ANASTAMOSIS/ OPEN CHOLECYSTECTOMY/ EXTENSIVE LYSIS OF ADHESIONS performed by Nirav Moran DO at [place] [address]  2011    Pacemaker is Medtronic Revo (compatible). Leads placed in 1995 are NOT MRI compatible. Placed at [place]. V's per Dr. Narinder Mendez can not have an MRI.     UPPER GASTROINTESTINAL ENDOSCOPY N/A 4/16/2019    EGD ESOPHAGOGASTRODUODENOSCOPY @ BEDSIDE  ICU 2002 performed by Ruy Gonzalez MD at [address] [place] [address] OR       Immunization History:   Immunization History   Administered Date(s) Administered    Influenza, Triv, 3 Years and older, IM (Afluria (5 yrs and older) 10/07/2013    Pneumococcal Polysaccharide ([id number]) 01/05/2012       Active Problems:  Patient Active Problem List   Diagnosis Code    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    CVA (cerebral vascular accident) [facility] I63.9    Abnormal computed tomography of cervical spine R93.7    Weight loss R63.4    Functional gait abnormality R26.89    Left knee pain M25.562    Knee pain, left M25.562    Acute pain of left knee M25.562    Sepsis due to urinary tract infection ([address] A41.9, N39.0    Cystitis N30.90    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    Septic shock ([address] A41.9, R65.21    Leukemoid reaction D72.823    Aspiration pneumonia of right lower lobe due to vomit ([address] J69.0    MRSA carrier Z22.322    Urinary retention R33.9    Abdominal distention R14.0    Elevated CEA R97.0    Elevated CA 19-9 level R97.8    Cholecystitis K81.9    CRP elevated R79.82    Elevated procalcitonin R79.89    Bandemia D72.825    Centrilobular emphysema (HCC) J43.2    Hemorrhage of rectum and anus K62.5    GI bleed K92.2    Anemia D64.9    Small bowel obstruction (HCC) K56.609    Anxiety disorder F41.9    Noncompliance Z91.19    Acute respiratory failure (HCC) J96.00    Chronic obstructive pulmonary disease, unspecified (HCC) J44.9    Bacteremia due to coagulase-negative Staphylococcus R78.81, B95.7    Bradycardia R00.1    Fever R50.9    Abdominal wall abscess at site of surgical wound T81.49XA    Hypotension I95.9    Hospital-acquired pneumonia J18.9, Y95       Isolation/Infection: Isolation          Contact        Patient Infection Status     Infection Onset Added Last Indicated Last Indicated By Review Planned Expiration Resolved Resolved By    Mycoplasma pneumonia 12/03/20 12/04/20 12/03/20 Mycoplasma Ab,IgM        C-diff Rule Out 12/03/20 12/03/20 12/03/20 C DIFF TOXIN/ANTIGEN (Ordered)        MDRO (multi-drug resistant organism) 07/26/19 07/29/19 07/26/19 Anaerobic and Aerobic Culture        Proteus - Abdominal abscess  7/2019    ESBL (Extended Spectrum Beta Lactamase)  04/29/19 07/26/19 Anaerobic and Aerobic Culture        4/22/2019 gallbladder-klebsiella    MRSA 04/12/19 04/12/19 12/03/20 MRSA DNA Probe, Nasal        Resolved    COVID-19 Rule Out 12/03/20 12/03/20 12/03/20 Respiratory Panel, Molecular, with COVID-19 (Restricted: peds pts or suitable admitted adults) (Ordered)   12/04/20 Rule-Out Test Resulted    COVID-19 Rule Out 12/03/20 12/03/20 12/03/20 COVID-19 (Ordered)   12/03/20 Rule-Out Test Resulted    MDRO (multi-drug resistant organism) 04/22/19 04/27/19 04/22/19 Anaerobic and Aerobic Culture   05/28/19 Cyril Menchaca RN    Klebsiella ESBL- gallbladder          Nurse Assessment:  Last Vital Signs: /62   Pulse 89   Temp 98.6 °F (37 °C) (Oral)   Resp 16   Ht 5' (1.524 m)   Wt 128 lb 15.5 oz (58.5 kg)   SpO2 95%   BMI 25.19 kg/m²     Last documented pain score (0-10 scale): Pain Level: 0  Last Weight:   Wt Readings from Last 1 Encounters:   12/04/20 128 lb 15.5 oz (58.5 kg)     Mental Status:  {IP PT MENTAL STATUS:20030}    IV Access:  {[facility] IV ACCESS:257244213}    Nursing Mobility/ADLs:  Walking   {P DME VGWE:766740600}  Transfer  {P DME FRKT:184294009}  Bathing  {P DME QFQD:637112582}  Dressing  {P DME ALOW:125795246}  Toileting  {P DME OGGV:120823617}  Feeding  {CHP DME MSOE:826346895}  Med Admin  {[facility] SZBN:860323968}  Med Delivery   {[facility] MED Delivery:361139665}    Wound Care Documentation and Therapy:  Wound 04/11/19 Coccyx Mid (Active) Number of days: 603       Wound 04/15/19 Thigh Left;Posterior (Active)   Number of days: 599       Wound 19 Leg Left; Lower under distal lateral cast (Active)   Number of days: 582       Wound 19 Buttocks Right;Upper top of layer of skin sloughed off, reddness  (Active)   Number of days: 577       Wound 19 Tibial Distal;Left;Posterior reddened area, previous cast rubbing area (Active)   Number of days: 577       Wound 19 Buttocks Left;Upper;Mid top layer of skin slough off, red under (Active)   Number of days: 577        Elimination:  Continence:   · Bowel: {YES / ZT:09466}  · Bladder: {YES / NO:25510}  Urinary Catheter: {Urinary Catheter:921931463}   Colostomy/Ileostomy/Ileal Conduit: {YES / NE:39247}       Date of Last BM: ***    Intake/Output Summary (Last 24 hours) at 2020 1449  Last data filed at 2020 0830  Gross per 24 hour   Intake 3037.89 ml   Output 1000 ml   Net 2037.89 ml     I/O last 3 completed shifts: In: 2571.7 [P.O.:120;  I.V.:2451.7]  Out: 1000 [Urine:1000]    Safety Concerns:     [address] Safety Concerns:769308683}    Impairments/Disabilities:      [address] Impairments/Disabilities:715603549}    Nutrition Therapy:  Current Nutrition Therapy:   [address] Diet List:256576299}    Routes of Feeding: {CHP DME Other Feedings:834633295}  Liquids: {Slp liquid thickness:17419}  Daily Fluid Restriction: {CHP DME Yes amt example:932533909}  Last Modified Barium Swallow with Video (Video Swallowing Test): {Done Not Done ILFO:275621335}    Treatments at the Time of Hospital Discharge:   Respiratory Treatments: ***  Oxygen Therapy:  {Therapy; copd oxygen:18976}  Ventilator:    { CC Vent FWRY:209758069}    Rehab Therapies: Physical Therapy and Occupational Therapy  Weight Bearing Status/Restrictions: [address]  Weight Bearin}  Other Medical Equipment (for information only, NOT a DME order):  {EQUIPMENT:756281550}  Other Treatments: skilled Nursing assessment and monitoring,medicaiton Education    Patient's personal belongings (please select all that are sent with patient):  {CHP DME Belongings:794786794}    RN SIGNATURE:  {Esignature:014857461}    CASE MANAGEMENT/SOCIAL WORK SECTION    Inpatient Status Date: 12/3/2020    Readmission Risk Assessment Score:  Readmission Risk              Risk of Unplanned Readmission:        16           Discharging to Facility/ [address] [address]  Phone [phone]  Fax [phone]  Evening fax [phone]     [address]  Phone: [phone]  Fax [phone]  / signature: Electronically signed by Oracio Morejon RN on 12/4/20 at 2:49 PM EST    PHYSICIAN SECTION    Prognosis: {Prognosis:3800653495}    Condition at Discharge: Sisi Lindo Patient Condition:533114210}    Rehab Potential (if transferring to Rehab): {Prognosis:0803934221}    Recommended Labs or Other Treatments After Discharge: ***    Physician Certification: I certify the above information and transfer of Janessa Chua  is necessary for the continuing treatment of the diagnosis listed and that she requires {Admit to Appropriate Level of Care:47492} for {GREATER/LESS:593029956} 30 days.      Update Admission H&P: {CHP DME Changes in LBAOI:834395421}    PHYSICIAN SIGNATURE:  {Esignature:448752190}

## 2020-12-04 NOTE — CONSULTS
[facility] -   Infectious diseases evaluation  admission date 12/3/2020    reason for consultation:   Healthcare acquired pneumonia  Impression :   Current:  · Healthcare acquired pneumonia with cavitary right lower lobe lesion could be infection/malignancy versus septic emboli seen on CT abdomen/positive mycoplasma IgM. · Coagulase-negative staph bacteremia versus contamination could be related to peripheral lines that was removed. · Positive MRSA swab  · Hypertension  · Urinary retention  · Chronic CHF  · History of ESBL and MRSA infection    Other:  · Penicillin allergy    Recommendations   · Echocardiogram pending  · CT chest pending  · Continue IV vancomycin  · Discontinue cefepime  · IV Levaquin  · Postvoid residual  · Follow CBC renal function  · Continue supportive care        History of Present Illness:   Initial history:  Zaire Diamond is a 65y.o.-year-old female with history of CVA was brought to the ER from her nursing home for low blood pressure  for 1 day associated with generalized weakness. The patient had poor IV access and was not able to get IV fluids at the nursing home. The patient was placed on Levophed initially  12/2/2020 WBC 12.6, creatinine normal,  980, lactic acid 1.3, COVID-19 rapid test was negative. Blood cultures 12/3/2012 2 grew gram-positive cocci in cluster, procalcitonin 0.08, mycoplasma IgM 1.22, C-reactive protein 156, nasal swab for MRSA was positive, respiratory molecular panel with Covid PCR was negative  Chest x-ray showed the left greater than right basilar opacities  CT abdomen pelvis suggestive of enteritis, left lower lobe consolidation, cavitary 4.3 cm mass in the right lower lobe with a small air-fluid level. Interval changes  12/4/2020   She was weaned off Levophed, decreased appetite, central line in place, had cough and shortness of breath,  no reported fever.   According to patient notes she had 2 peripheral line to her abdominal wall at the nursing home was removed this admission. She had right IJ central line placed initially that was removed. urinalysis showed trace leukocyte esterase, WBC 5-10, urine for Legionella antigen negative, potassium 2.9, renal function normal, WBC 12.5, cortisol level 22.2    Patient Vitals for the past 8 hrs:   BP Temp Temp src Pulse Resp SpO2   12/04/20 1342 137/62 98.6 °F (37 °C) Oral 89 16 95 %   12/04/20 0824 (!) 143/58 99.7 °F (37.6 °C) Oral 83 18 95 %           I have personally reviewed the past medical history, past surgical history, medications, social history, and family history, and I haveupdated the database accordingly. Allergies:   Penicillins and Amoxicillin     Review of Systems:     Review of Systems  Decreased appetite, mild cough and shortness of breath, other than above 12 systems reviewed were negative  Physical Examination :       Physical Exam  HENT:      Head: Normocephalic and atraumatic. Right Ear: External ear normal.      Left Ear: External ear normal.      Mouth/Throat:      Pharynx: Oropharynx is clear. No posterior oropharyngeal erythema. Eyes:      General: No scleral icterus. Conjunctiva/sclera: Conjunctivae normal.   Neck:      Musculoskeletal: Neck supple. No neck rigidity. Cardiovascular:      Rate and Rhythm: Normal rate and regular rhythm. Heart sounds: No murmur. Pulmonary:      Effort: Pulmonary effort is normal.      Breath sounds: Normal breath sounds. No wheezing. Abdominal:      Palpations: Abdomen is soft. Tenderness: There is no abdominal tenderness. Genitourinary:     Comments: External urinary catheter in place  Musculoskeletal:      Right lower leg: No edema. Left lower leg: No edema. Skin:     General: Skin is warm. Coloration: Skin is not jaundiced. Neurological:      Mental Status: She is alert.       Comments: Oriented to person and place  Left-sided weakness         Past Medical History:     Past Medical History:   Diagnosis Date    Abnormal computed tomography of cervical spine     sclerotic bone appearance     CVA (cerebral vascular accident) ([address]     left  side weakness    GERD (gastroesophageal reflux disease)     Hypertension     Paraproteinemia     Weight loss        Past Surgical  History:     Past Surgical History:   Procedure Laterality Date    ABDOMEN SURGERY N/A 5/25/2019    ABDOMEN DEBRIDEMENT WOUND CLOSURE REOPENING OF RECENT LAPOROATOMY, ABDOMINAL WASHOUT, REPAIR FASCIAL DEHISENCE WITH MESH performed by Brijesh Browne DO at [facility] 5/15/2019    CHOLECYSTECTOMY performed by Brijesh Browne DO at [address] [facility] 5/25/2019    GASTROSTOMY TUBE PLACEMENT performed by Brijesh Browne DO at [facility] [address] [place] [address] N/A 7/27/2019    EGD ESOPHAGOGASTRODUODENOSCOPY WITH REMOVAL OF PEG TUBE performed by Brijesh Browne DO at [address]  4/14/2019         LAPAROSCOPY N/A 5/15/2019    LAPAROSCOPY EXPLORATORY CONVERTED TO EXPLORATORY LAPAROTOMY/ RIGHT COLON RESECTION AND ANASTAMOSIS/ OPEN CHOLECYSTECTOMY/ EXTENSIVE LYSIS OF ADHESIONS performed by Brijesh Browne DO at [place] [address]  07/2011    Pacemaker is Medtronic Revo (compatible). Leads placed in 1995 are NOT MRI compatible. Placed at [facility]. V's per Dr. Sanjuanita Nelson can not have an MRI.     UPPER GASTROINTESTINAL ENDOSCOPY N/A 4/16/2019    EGD ESOPHAGOGASTRODUODENOSCOPY @ BEDSIDE  ICU 2002 performed by Filomena Allison MD at [facility] OR       Medications:      vancomycin (VANCOCIN) intermittent dosing (placeholder)   Other RX Placeholder    vancomycin  1,000 mg Intravenous Q24H    sodium chloride  500 mL Intravenous Once    budesonide-formoterol  2 puff Inhalation BID    divalproex  125 mg Oral BID    traZODone  100 mg Oral Nightly    sertraline  50 mg Oral Daily    mirtazapine  7.5 mg Oral Nightly    levothyroxine  125 mcg Oral Daily    baclofen  10 mg Oral BID    sodium chloride flush  10 mL Intravenous 2 times per day    enoxaparin  40 mg Subcutaneous Daily    cefepime  2 g Intravenous Q12H       Social History:     Social History     Socioeconomic History    Marital status:      Spouse name: Not on file    Number of children: Not on file    Years of education: Not on file    Highest education level: Not on file   Occupational History    Not on file   Social Needs    Financial resource strain: Not on file    Food insecurity     Worry: Not on file     Inability: Not on file    Transportation needs     Medical: Not on file     Non-medical: Not on file   Tobacco Use    Smoking status: Former Smoker     Packs/day: 1.00     Years: 30.00     Pack years: 30.00    Smokeless tobacco: Never Used   Substance and Sexual Activity    Alcohol use: Not Currently     Alcohol/week: 28.0 standard drinks     Types: 28 Glasses of wine per week    Drug use: No    Sexual activity: Not on file   Lifestyle    Physical activity     Days per week: Not on file     Minutes per session: Not on file    Stress: Not on file   Relationships    Social connections     Talks on phone: Not on file     Gets together: Not on file     Attends Yazidi service: Not on file     Active member of club or organization: Not on file     Attends meetings of clubs or organizations: Not on file     Relationship status: Not on file    Intimate partner violence     Fear of current or ex partner: Not on file     Emotionally abused: Not on file     Physically abused: Not on file     Forced sexual activity: Not on file   Other Topics Concern    Not on file   Social History Narrative    Not on file       Family History:   History reviewed. No pertinent family history.    Medical Decision Making:   I have independently reviewed/ordered the following labs:    CBC with Differential:   Recent Labs     12/03/20  0147 12/04/20  0905   WBC 11.5* 12.5*   HGB 10.2* 9.7*

## 2020-12-04 NOTE — CARE COORDINATION
This patient is from [facility] and is a bed hold under her [employer]. She does not require a new pre-cert.

## 2020-12-04 NOTE — PLAN OF CARE
Problem: Falls - Risk of:  Goal: Will remain free from falls  Description: Will remain free from falls  12/4/2020 1757 by Quince Batch  Outcome: Ongoing  Note: Pt had no falls this shift. Call light within reach. Side rails up x2. Bed in lowest position. Patient safety maintained. 12/4/2020 0840 by Papito Terrell RN  Outcome: Met This Shift  Goal: Absence of physical injury  Description: Absence of physical injury  12/4/2020 1757 by Quinavani Snootlab  Outcome: Ongoing  Note: No injuries this shift. Pt safety maintained. 12/4/2020 0840 by Papito Terrell RN  Outcome: Met This Shift     Problem: Pain:  Goal: Pain level will decrease  Description: Pain level will decrease  12/4/2020 1757 by Quince Batch  Outcome: Ongoing  Note: Patient's pain is controlled with PRN pain meds. 12/4/2020 0840 by Papito Terrell RN  Outcome: Met This Shift  Goal: Control of acute pain  Description: Control of acute pain  12/4/2020 1757 by Quince Batch  Outcome: Ongoing  Note: Patient's pain is controlled with PRN pain meds. 12/4/2020 0840 by Papito Terrell RN  Outcome: Met This Shift  Goal: Control of chronic pain  Description: Control of chronic pain  12/4/2020 1757 by Quince Batch  Outcome: Ongoing  Note: Patient's pain is controlled with PRN pain meds. 12/4/2020 0840 by Papito Terrell RN  Outcome: Met This Shift     Problem: Skin Integrity:  Goal: Will show no infection signs and symptoms  Description: Will show no infection signs and symptoms  12/4/2020 1757 by Quince Batch  Outcome: Ongoing  Note: Skin assessment as charted. No new skin breakdown noted. 12/4/2020 0840 by Papito Terrell RN  Outcome: Met This Shift  Goal: Absence of new skin breakdown  Description: Absence of new skin breakdown  12/4/2020 1757 by Quinavani Blake  Outcome: Ongoing  Note: Skin assessment as charted. No new skin breakdown noted.    12/4/2020 0840 by Papito Terrell RN  Outcome: Met This Shift  Goal: Demonstration of wound healing without infection will improve  Description: Demonstration of wound healing without infection will improve  12/4/2020 1757 by El Chaidez  Outcome: Ongoing  12/4/2020 0840 by Lindsey Mancini RN  Outcome: Met This Shift  Goal: Complications related to intravenous access or infusion will be avoided or minimized  Description: Complications related to intravenous access or infusion will be avoided or minimized  12/4/2020 1757 by El Chaidez  Outcome: Ongoing  12/4/2020 0840 by Lindsey Mancini RN  Outcome: Met This Shift     Problem: Cardiac:  Goal: Ability to maintain vital signs within normal range will improve  Description: Ability to maintain vital signs within normal range will improve  12/4/2020 1757 by El Chaidez  Outcome: Ongoing  Note: Patient remains free from hypotension.   12/4/2020 0840 by Lindsey Mancini RN  Outcome: Met This Shift  Goal: Cardiovascular alteration will improve  Description: Cardiovascular alteration will improve  12/4/2020 1757 by El Chaidez  Outcome: Ongoing  12/4/2020 0840 by Lindsey Mancini RN  Outcome: Met This Shift     Problem: Health Behavior:  Goal: Will modify at least one risk factor affecting health status  Description: Will modify at least one risk factor affecting health status  12/4/2020 1757 by El Chaidez  Outcome: Ongoing  12/4/2020 0840 by Lindsey Mancini RN  Outcome: Ongoing  Goal: Identification of resources available to assist in meeting health care needs will improve  Description: Identification of resources available to assist in meeting health care needs will improve  12/4/2020 1757 by El Chaidez  Outcome: Ongoing  12/4/2020 0840 by Lindsey Mancini RN  Outcome: Met This Shift     Problem: Physical Regulation:  Goal: Ability to maintain vital signs within normal range will improve  Description: Ability to maintain vital signs within normal range will improve  12/4/2020 1757 by El Chaidez  Outcome: Ongoing  Note: Patient remains free from hypotension. 12/4/2020 0840 by Arthur Mendez RN  Outcome: Met This Shift  Goal: Complications related to the disease process, condition or treatment will be avoided or minimized  Description: Complications related to the disease process, condition or treatment will be avoided or minimized  12/4/2020 1757 by Anthony Ferrari  Outcome: Ongoing  12/4/2020 0840 by Arthur Mendez RN  Outcome: Ongoing  Goal: Diagnostic test results will improve  Description: Diagnostic test results will improve  12/4/2020 1757 by Anthony Ferrari  Outcome: Ongoing  12/4/2020 0840 by Arthur Mendez RN  Outcome: Ongoing  Goal: Will remain free from infection  Description: Will remain free from infection  12/4/2020 1757 by Anthony Ferrari  Outcome: Ongoing  12/4/2020 0840 by Arthur Mendez RN  Outcome: Ongoing     Problem: Nutritional:  Goal: Nutritional status will improve  Description: Nutritional status will improve  12/4/2020 1757 by Anthony Ferrari  Outcome: Ongoing  Note: Nutrition status is poor. Pt refusing to eat. 12/4/2020 0840 by Arthur Mendez RN  Outcome: Ongoing     Problem: Respiratory:  Goal: Ability to maintain normal respiratory secretions will improve  Description: Ability to maintain normal respiratory secretions will improve  12/4/2020 1757 by Anthony Ferrari  Outcome: Ongoing  Note: Respiratory assessment charted. No signs of distress noted.     12/4/2020 0840 by Arthur Mendez RN  Outcome: Met This Shift     Problem: HEMODYNAMIC STATUS  Goal: Patient has stable vital signs and fluid balance  12/4/2020 1757 by Anthony Ferrari  Outcome: Ongoing  12/4/2020 0840 by Arthur Mendez RN  Outcome: Met This Shift     Problem: ACTIVITY INTOLERANCE/IMPAIRED MOBILITY  Goal: Mobility/activity is maintained at optimum level for patient  12/4/2020 1757 by Anthony Ferrari  Outcome: Ongoing  12/4/2020 0840 by Arthur Mendez RN  Outcome: Met This Shift     Problem: COMMUNICATION IMPAIRMENT  Goal: Ability to express needs and understand communication  12/4/2020 1757 by Wanda Rogers  Outcome: Ongoing  12/4/2020 0840 by Red Garza RN  Outcome: Met This Shift     Problem: Nutrition  Goal: Optimal nutrition therapy  Description: Nutrition Problem #1: Inadequate oral intake  Intervention: Food and/or Nutrient Delivery: Continue Current Diet, Start Oral Nutrition Supplement  Nutritional Goals: po intake greater than 50%     Outcome: Ongoing

## 2020-12-05 PROBLEM — Z95.0 PACEMAKER: Status: ACTIVE | Noted: 2020-01-01

## 2020-12-05 NOTE — CARE COORDINATION
ONGOING DISCHARGE PLAN:    Spoke with patient regarding discharge plan and patient does not want to return to [facility]. Her friend lisa Barajas at bedside and  Advised she wants Tariq Cornelius to come live with her. However she is unable to get her a bedroom ready for her until next week. Writer advised Tariq Cornelius and her friend lisa linh=t patient would need to go back to [facility] and work with a  on transition her to [facility]. Several DME equipment would need to be ordered and delivered to her home. Annabella Arriaga advised she has to move her bedroom upstairs so Yolis Kahn can have the 1st floor bedroom. Patient was agreeable to go back to [facility] until lisa's home is ready for her to move in. LSW Ashley To notified of this    Per previous LSW notes - Patient is a bed hold and no pre cert is needed to return. Will continue to follow for additional discharge needs.     Electronically signed by Moy Marie RN on 12/5/2020 at 12:08 PM

## 2020-12-05 NOTE — PROGRESS NOTES
[facility] -   Infectious diseases evaluation  admission date 12/3/2020    reason for consultation:   Healthcare acquired pneumonia  Impression :   Current:  · Healthcare acquired pneumonia with cavitary right lower lobe lesion could be infection/malignancy versus septic emboli seen on CT abdomen/positive mycoplasma IgM. · Coagulase-negative staph bacteremia versus contamination could be related to peripheral lines that was removed. · Staph Capitis bacteremia  · Elevated CRP  · Leukocytosis  · Positive MRSA swab  · Hypertension  · Urinary retention  · Chronic CHF  · History of ESBL and MRSA infection    Other:  · Penicillin allergy-Shortness of breath, Rash    Recommendations   · Continue Vancomycin IV, pharmacy to dose. Day 3.  · Continue Levaquin 750 mg IV daily. Day 2.   · Follow CBC and renal function closely. · Repeat BC x 2. Follow final sensitivities. · Echo was negative for vegetations. · Continue supportive care  · Discussed with patient, RN. History of Present Illness:   Initial history:  Madeleine Mixon is a 65y.o.-year-old female with history of CVA was brought to the ER from her nursing home for low blood pressure  for 1 day associated with generalized weakness. The patient had poor IV access and was not able to get IV fluids at the nursing home. The patient was placed on Levophed initially  12/2/2020 WBC 12.6, creatinine normal,  980, lactic acid 1.3, COVID-19 rapid test was negative. Blood cultures 12/3/2012 2 grew gram-positive cocci in cluster, procalcitonin 0.08, mycoplasma IgM 1.22, C-reactive protein 156, nasal swab for MRSA was positive, respiratory molecular panel with Covid PCR was negative  Chest x-ray showed the left greater than right basilar opacities  CT abdomen pelvis suggestive of enteritis, left lower lobe consolidation, cavitary 4.3 cm mass in the right lower lobe with a small air-fluid level.     Interval changes  2020   Afebrile. C/O having a lot of generalized pain. No fever, chills, nausea, vomiting. Having some diarrhea. Getting IV magnesium. No dyer. Sacrum red, no breakdown, open areas. Generalized edema. Patient Vitals for the past 8 hrs:   Weight   20 0442 138 lb 1.6 oz (62.6 kg)     Labs:  Cre: 0.63-<0.40  Procalcitonin: 0.08  CRP: 156.6  WBC: 12.5-11.2  Mycoplasma IgM-1. 22. Micro:  12/3 BC x 2-1 Staph Hominis,1  Staph Capitis  12/3 MRSA nasal swab-+  12/3 Respiratory Virus Panel-Negative.  Urine for Legionella-Negative. Imagin/4 Echocardiogram-No vegetations.  CT chest     Approximately 4.6 x 4.4 x 3.5 cm cavitary right lower lobe mass, similar to    the recent abdomen and pelvis CT from 2020.  This is new from a CT    dated 2019.  This could represent a cavitary pneumonia however a    primary lung neoplasm is not excluded.  Recommend short-term follow-up CT    after treatment to demonstrate interval change.         Rounded opacity in the left lower lobe broadly abutting the pleura could    represent rounded atelectasis or infection.  Additional airspace opacities in    the left lower lobe are suspicious for infection.  This area can also be    followed up with CT.         Small bilateral pleural effusions.         3 mm right upper lobe pulmonary nodule.  Follow-up recommendations are below.         Suspected enlarged lymph node in the left upper mediastinum posterior to the    medial left clavicle, nonspecific.             I have personally reviewed the past medical history, past surgical history, medications, social history, and family history, and I haveupdated the database accordingly. Allergies:   Penicillins and Amoxicillin     Review of Systems:     Review of Systems   Constitutional: Positive for appetite change and fatigue. Negative for chills and fever. HENT: Negative. Eyes: Negative. Respiratory: Negative.     Cardiovascular: baseline. Motor: Weakness present. Comments: Generalized weakness. Psychiatric:         Mood and Affect: Mood normal.         Behavior: Behavior normal.         Thought Content: Thought content normal.         Judgment: Judgment normal.         Past Medical History:     Past Medical History:   Diagnosis Date    Abnormal computed tomography of cervical spine     sclerotic bone appearance     CVA (cerebral vascular accident) ([address]     left  side weakness    GERD (gastroesophageal reflux disease)     Hypertension     Paraproteinemia     Weight loss        Past Surgical  History:     Past Surgical History:   Procedure Laterality Date    ABDOMEN SURGERY N/A 5/25/2019    ABDOMEN DEBRIDEMENT WOUND CLOSURE REOPENING OF RECENT LAPOROATOMY, ABDOMINAL WASHOUT, REPAIR FASCIAL DEHISENCE WITH MESH performed by Alden Gibbs DO at [facility] 5/15/2019    CHOLECYSTECTOMY performed by Alden Gibbs DO at [facility] 5/25/2019    GASTROSTOMY TUBE PLACEMENT performed by Alden Gibbs DO at [facility] N/A 7/27/2019    EGD ESOPHAGOGASTRODUODENOSCOPY WITH REMOVAL OF PEG TUBE performed by Alden Gibbs DO at [address] [place] [address]  4/14/2019         LAPAROSCOPY N/A 5/15/2019    LAPAROSCOPY EXPLORATORY CONVERTED TO EXPLORATORY LAPAROTOMY/ RIGHT COLON RESECTION AND ANASTAMOSIS/ OPEN CHOLECYSTECTOMY/ EXTENSIVE LYSIS OF ADHESIONS performed by Alden Gibbs DO at [place] [address]  07/2011    Pacemaker is Medtronic Revo (compatible). Leads placed in 1995 are NOT MRI compatible. Placed at [facility]. V's per Dr. Sunny Mcknight can not have an MRI.     UPPER GASTROINTESTINAL ENDOSCOPY N/A 4/16/2019    EGD ESOPHAGOGASTRODUODENOSCOPY @ [facility]  ICU 2002 performed by Nancy Guevara MD at [facility] OR       Medications:      metoprolol tartrate  50 mg Oral BID    levofloxacin  750 mg Intravenous Q24H    vancomycin (VANCOCIN) intermittent dosing (placeholder)   Other RX Placeholder    vancomycin  1,000 mg Intravenous Q24H    sodium chloride  500 mL Intravenous Once    budesonide-formoterol  2 puff Inhalation BID    divalproex  125 mg Oral BID    traZODone  100 mg Oral Nightly    sertraline  50 mg Oral Daily    mirtazapine  7.5 mg Oral Nightly    levothyroxine  125 mcg Oral Daily    baclofen  10 mg Oral BID    sodium chloride flush  10 mL Intravenous 2 times per day    enoxaparin  40 mg Subcutaneous Daily       Social History:     Social History     Socioeconomic History    Marital status:       Spouse name: Not on file    Number of children: Not on file    Years of education: Not on file    Highest education level: Not on file   Occupational History    Not on file   Social Needs    Financial resource strain: Not on file    Food insecurity     Worry: Not on file     Inability: Not on file    Transportation needs     Medical: Not on file     Non-medical: Not on file   Tobacco Use    Smoking status: Former Smoker     Packs/day: 1.00     Years: 30.00     Pack years: 30.00    Smokeless tobacco: Never Used   Substance and Sexual Activity    Alcohol use: Not Currently     Alcohol/week: 28.0 standard drinks     Types: 28 Glasses of wine per week    Drug use: No    Sexual activity: Not on file   Lifestyle    Physical activity     Days per week: Not on file     Minutes per session: Not on file    Stress: Not on file   Relationships    Social connections     Talks on phone: Not on file     Gets together: Not on file     Attends [employer] service: Not on file     Active member of club or organization: Not on file     Attends meetings of clubs or organizations: Not on file     Relationship status: Not on file    Intimate partner violence     Fear of current or ex partner: Not on file     Emotionally abused: Not on file     Physically abused: Not on file     Forced sexual activity: Not on file   Other Topics Concern    Not on file Social History Narrative    Not on file       Family History:   History reviewed. No pertinent family history. Medical Decision Making:   I have independently reviewed/ordered the following labs:    CBC with Differential:   Recent Labs     12/04/20  0905 12/05/20  0854   WBC 12.5* 11.2*   HGB 9.7* 9.5*   HCT 29.5* 28.4*    258   LYMPHOPCT 10* 8*   MONOPCT 8* 3     BMP:  Recent Labs     12/04/20  0905 12/04/20  2121 12/05/20  0854   *  --  132*   K 2.9* 3.9 3.1*   CL 99  --  103   CO2 24  --  19*   BUN 9  --  4*   CREATININE <0.40*  --  <0.40*   MG 1.3*  --  1.1*     Hepatic Function Panel:   Recent Labs     12/03/20  0147   PROT 5.8*   LABALBU 2.3*   BILIDIR 0.13   IBILI 0.18   BILITOT 0.31   ALKPHOS 68   ALT 8   AST 11     No results for input(s): RPR in the last 72 hours. No results for input(s): HIV in the last 72 hours. No results for input(s): BC in the last 72 hours. Lab Results   Component Value Date    CREATININE <0.40 12/05/2020    GLUCOSE 95 12/05/2020    GLUCOSE 87 05/21/2012       Detailed results: Thank you for allowing us to participate in the care of this patient. Please call with questions. This note is created with the assistance of a speech recognition program.  While intending to generate adocument that actually reflects the content of the visit, the document can still have some errors including those of syntax and sound a like substitutions which may escape proof reading. It such instances, actual meaningcan be extrapolated by contextual diversion.     AGAPITO Diaz - CNP  Office: [phone]  Perfect serve / office [phone]

## 2020-12-05 NOTE — PROGRESS NOTES
and leg swelling. Gastrointestinal: Negative for abdominal pain, constipation, diarrhea and nausea. Genitourinary: Negative for difficulty urinating and dysuria. Urinary retention   Musculoskeletal: Negative for back pain and joint swelling. Skin: Negative for rash. Neurological: Negative for dizziness, weakness and headaches. Psychiatric/Behavioral: Negative for agitation and behavioral problems. Medications: Allergies: Allergies   Allergen Reactions    Penicillins Shortness Of Breath and Rash    Amoxicillin        Current Meds:   Scheduled Meds:    levofloxacin  750 mg Intravenous Q24H    vancomycin (VANCOCIN) intermittent dosing (placeholder)   Other RX Placeholder    vancomycin  1,000 mg Intravenous Q24H    sodium chloride  500 mL Intravenous Once    budesonide-formoterol  2 puff Inhalation BID    divalproex  125 mg Oral BID    traZODone  100 mg Oral Nightly    sertraline  50 mg Oral Daily    mirtazapine  7.5 mg Oral Nightly    levothyroxine  125 mcg Oral Daily    baclofen  10 mg Oral BID    sodium chloride flush  10 mL Intravenous 2 times per day    enoxaparin  40 mg Subcutaneous Daily     Continuous Infusions:    lactated ringers Stopped (12/03/20 1522)    sodium chloride 100 mL/hr at 12/05/20 0416     PRN Meds: perflutren lipid microspheres, sodium chloride flush, ipratropium-albuterol, HYDROcodone-acetaminophen, sodium chloride flush, potassium chloride **OR** potassium alternative oral replacement **OR** potassium chloride, magnesium sulfate, acetaminophen **OR** acetaminophen, polyethylene glycol, promethazine **OR** ondansetron    Data:     Past Medical History:   has a past medical history of Abnormal computed tomography of cervical spine, CVA (cerebral vascular accident) ([facility] [place] [address] GERD (gastroesophageal reflux disease), Hypertension, Paraproteinemia, and Weight loss. Social History:   reports that she has quit smoking.  She has a 30.00 pack-year smoking history. She has never used smokeless tobacco. She reports previous alcohol use of about 28.0 standard drinks of alcohol per week. She reports that she does not use drugs. Family History: History reviewed. No pertinent family history. Vitals:  /70   Pulse 100   Temp 98.1 °F (36.7 °C) (Oral)   Resp 16   Ht 5' (1.524 m)   Wt 138 lb 1.6 oz (62.6 kg)   SpO2 95%   BMI 26.97 kg/m²   Temp (24hrs), Av.3 °F (36.8 °C), Min:98.1 °F (36.7 °C), Max:98.6 °F (37 °C)    No results for input(s): POCGLU in the last 72 hours. I/O(24Hr):     Intake/Output Summary (Last 24 hours) at 2020 0837  Last data filed at 2020 0600  Gross per 24 hour   Intake 2097 ml   Output 1150 ml   Net 947 ml       Labs:    CBC with Differential:    Lab Results   Component Value Date    WBC 12.5 2020    RBC 3.24 2020    RBC 3.66 2012    HGB 9.7 2020    HCT 29.5 2020     2020     2012    MCV 91.0 2020    MCH 30.0 2020    MCHC 33.0 2020    RDW 14.8 2020    METASPCT 1 2019    LYMPHOPCT 10 2020    MONOPCT 8 2020    MYELOPCT 2 2019    BASOPCT 0 2020    MONOSABS 0.90 2020    LYMPHSABS 1.30 2020    EOSABS 0.20 2020    BASOSABS 0.00 2020    DIFFTYPE NOT REPORTED 2020     BMP:    Lab Results   Component Value Date     2020    K 3.9 2020    CL 99 2020    CO2 24 2020    BUN 9 2020    LABALBU 2.3 2020    LABALBU 4.6 2012    CREATININE <0.40 2020    CALCIUM 8.2 2020    GFRAA CANNOT BE CALCULATED 2020    LABGLOM CANNOT BE CALCULATED 2020    GLUCOSE 84 2020    GLUCOSE 87 2012       Lab Results   Component Value Date/Time    SPECIAL NOT REPORTED 2020 04:07 PM     Lab Results   Component Value Date/Time    CULTURE PENDING 2020 04:07 PM         Radiology:    Ct Chest Wo Contrast    Result Date: 12/4/2020  EXAMINATION: CT OF THE CHEST WITHOUT CONTRAST 12/4/2020 3:08 pm TECHNIQUE: CT of the chest was performed without the administration of intravenous contrast. Multiplanar reformatted images are provided for review. Dose modulation, iterative reconstruction, and/or weight based adjustment of the mA/kV was utilized to reduce the radiation dose to as low as reasonably achievable. COMPARISON: Chest radiograph 12/03/2020, CT abdomen and pelvis 12/03/2020. HISTORY: ORDERING SYSTEM PROVIDED HISTORY: pneumonia TECHNOLOGIST PROVIDED HISTORY: pneumonia Reason for Exam: f/u pneumonia Acuity: Unknown Type of Exam: Unknown FINDINGS: Mediastinum: Suspected 17 mm lymph node posterior to the left medial clavicle appears to have increased in size from a 2012 chest CT. No other definite lymphadenopathy. No pericardial effusion. Coronary artery calcifications are seen. The thoracic aorta is nonaneurysmal.  The main pulmonary artery is not significantly dilated. Right IJ CVC catheter tip is in the SVC. There is a left chest wall cardiac device with multiple leads. Lungs/pleura: The central airways are patent. Small bilateral pleural effusions. No pneumothorax is seen. Emphysematous changes are seen in the lungs. Similar appearance to the CT abdomen and pelvis from 12/03/2020, there is a cavitary mass in the right lower lobe abutting the pleura, measuring 4.6 x 4.4 x 3.5 cm. There is dependent atelectasis in the right lung. There is a rounded opacity in the left lower lobe broadly abutting the pleura. Additional airspace opacities seen in the left lower lobe. There are also areas of atelectasis in the left lingula and left lower lobe. 3 mm right upper lobe pulmonary nodule (series 601, image 60). Upper Abdomen: Limited images of the upper abdomen are unremarkable. 4 mm hypodensity in the liver is too small to characterize but also seen on the comparison abdomen CT (series 2, image 111).  Soft Tissues/Bones: No acute bony abnormality. There are old left-sided rib fractures. Approximately 4.6 x 4.4 x 3.5 cm cavitary right lower lobe mass, similar to the recent abdomen and pelvis CT from 12/03/2020. This is new from a CT dated 07/25/2019. This could represent a cavitary pneumonia however a primary lung neoplasm is not excluded. Recommend short-term follow-up CT after treatment to demonstrate interval change. Rounded opacity in the left lower lobe broadly abutting the pleura could represent rounded atelectasis or infection. Additional airspace opacities in the left lower lobe are suspicious for infection. This area can also be followed up with CT. Small bilateral pleural effusions. 3 mm right upper lobe pulmonary nodule. Follow-up recommendations are below. Suspected enlarged lymph node in the left upper mediastinum posterior to the medial left clavicle, nonspecific. RECOMMENDATIONS: Fleischner Society guidelines for follow-up and management of incidentally detected pulmonary nodules: Nodule size less than 6 mm In a low-risk patient, no routine follow-up. In a high-risk patient, optional CT at 12 months. - Low risk patients include individuals with minimal or absent history of smoking and other known risk factors. - High risk patients include individuals with a history or smoking or known risk factors. Radiology 2017 http://pubs. rsna.org/doi/full/10.1148/radiol. 3084501184     Ct Abdomen Pelvis W Iv Contrast Additional Contrast? None    Result Date: 12/3/2020  EXAMINATION: CT OF THE ABDOMEN AND PELVIS WITH CONTRAST 12/3/2020 2:49 am TECHNIQUE: CT of the abdomen and pelvis was performed with the administration of intravenous contrast. Multiplanar reformatted images are provided for review. Dose modulation, iterative reconstruction, and/or weight based adjustment of the mA/kV was utilized to reduce the radiation dose to as low as reasonably achievable. COMPARISON: August 13, 2019.  HISTORY: ORDERING SYSTEM PROVIDED HISTORY: global abdominal pain TECHNOLOGIST PROVIDED HISTORY: global abdominal pain Reason for Exam: Fatigue, abd pain. Due to patient condition, unable to have patient lay flat  or bring arms above head Acuity: Acute Type of Exam: Initial Relevant Medical/Surgical History: History, Gtube/peg tube placement, cholecystectomy FINDINGS: Lower Chest: Partially visualized left lower lobe heterogeneous consolidation. Trace left pleural fluid with pleural thickening. Right lower lobe 4.2 x 3.3 cm masslike opacity with central necrosis, central air-fluid level and mild surrounding heterogeneous/nodular opacities. Emphysema. Partially visualized cardiac pacer leads. Atherosclerosis of the visualized thoracic aorta. Liver: Few scattered subcentimeter hypodensities in the liver are similar to the prior study and likely represent benign cysts. No new suspicious liver mass. Smooth liver contour. Gallbladder and Bile Ducts: Prior cholecystectomy. Spleen: Normal. Adrenal Glands: Normal. Pancreas: Normal. Genitourinary: Normal. Bowel: The stomach is incompletely distended and not well evaluated. Postsurgical changes of the bowel. Normal caliber bowel. Fluid throughout the colon. Vasculature: Atherosclerosis. No abdominal aortic aneurysm. Patent main portal vein. Bones and Soft Tissues: Osteopenia. Degenerative changes in the visualized spine and pelvis. L3 superior endplate 76-67% height loss was not present on the prior study but appears to be chronic. Retroperitoneum/Mesentery: No intraperitoneal free air, ascites or fluid collection. No lymphadenopathy in the abdomen or pelvis. Fluid throughout the colon suggests enteritis. No bowel obstruction. Postsurgical changes of the bowel. Partially visualized left lower lobe heterogeneous consolidation with trace left basilar pleural fluid and pleural thickening. There is also a cavitary 4.3 cm mass in the right lower lobe which demonstrates a small air-fluid level. Findings may relate to infection. Underlying neoplastic process is not excluded. Short-term follow-up chest CT may be helpful for further evaluation. Xr Chest Portable    Result Date: 12/3/2020  EXAMINATION: ONE XRAY VIEW OF THE CHEST 12/3/2020 5:17 am COMPARISON: Earlier same day. HISTORY: ORDERING SYSTEM PROVIDED HISTORY: post RIJ central line TECHNOLOGIST PROVIDED HISTORY: post RIJ central line Reason for Exam: Right central line placement. Acuity: Acute Type of Exam: Initial Additional signs and symptoms: Right central line placement. FINDINGS: Interval placement of a right IJ central venous catheter with tip in the mid SVC. No pneumothorax. Redemonstration of low left lung volume and hyperexpanded right lung volume. Similar appearing left greater than right basilar airspace disease with left basilar consolidation and right lateral basilar masslike opacity. Small left pleural effusion. Stable left pectoral trans venous dual-chamber cardiac pacer device. Stable cardiomediastinal silhouette and great vessels. Interval placement of a right IJ central venous catheter with tip in the mid SVC. No pneumothorax. Otherwise, no significant interval change with redemonstration of bilateral basilar airspace disease with left basilar consolidation and right lateral basilar masslike opacity. Continued attention on follow-up recommended. Small left pleural effusion. Xr Chest Portable    Result Date: 12/3/2020  EXAMINATION: ONE XRAY VIEW OF THE CHEST 12/3/2020 2:20 am COMPARISON: May 29, 2019. HISTORY: ORDERING SYSTEM PROVIDED HISTORY: weakness TECHNOLOGIST PROVIDED HISTORY: weakness Reason for Exam: Pt c/o weakness, low blood pressure. Acuity: Acute Type of Exam: Initial Additional signs and symptoms: Pt c/o weakness, low blood pressure. FINDINGS: Frontal portable view of the chest.  Low left lung volume. Hyperexpanded right lung volume. Leftward shift of the mediastinal structures.   Left greater than right basilar heterogeneous opacities. Small bilateral pleural effusions versus pleural thickening. No pneumothorax. Atherosclerotic thoracic aorta. No significant cardiomegaly. Left pectoral trans venous dual-chamber cardiac pacer device. Osteopenia. Multilevel degenerative disc disease. Left greater than right basilar heterogeneous opacities. Differential considerations include atelectasis/scarring, asymmetric pulmonary edema and an infectious/inflammatory process. Follow-up PA and lateral chest radiographs or chest CT may be helpful for further evaluation as warranted. Small bilateral pleural effusions versus pleural thickening. Low left lung volume with leftward shift of the mediastinal structures. Hyperexpanded right lung volume. Physical Examination:        Physical Exam  Vitals signs reviewed. Constitutional:       General: She is not in acute distress. Appearance: She is normal weight. HENT:      Head: Normocephalic. Mouth/Throat:      Mouth: Mucous membranes are moist.   Cardiovascular:      Rate and Rhythm: Normal rate and regular rhythm. Heart sounds: Normal heart sounds. No murmur. No gallop. Pulmonary:      Effort: Pulmonary effort is normal. No respiratory distress. Breath sounds: Normal breath sounds. Chest:      Chest wall: No tenderness. Abdominal:      General: Bowel sounds are normal.      Palpations: Abdomen is soft. Tenderness: There is no abdominal tenderness. There is no guarding. Comments: Surgical incision along mid-abdomen. Abdomen catheter removed yesterday. Genitourinary:     Comments: Urinary retention, but excreting urine in external wick  Musculoskeletal:      Right lower leg: No edema. Left lower leg: No edema. Skin:     General: Skin is warm and dry. Comments: Sensitive to light touch, causes her pain. Neurological:      Mental Status: She is alert and oriented to person, place, and time. mm RUL nodule. Enlarged lymph in left upper mediastinum posterior to medial left clavicle. -Pro calcitonin wnl  -History smoking, 30 pack year. History of decrease appetite, malnutrition  -Working in [employer] that makes [employer] for [employer] on [employer]     Urinary retention  -Bladder scan shows 334 mL of urine  -Multiple unsuccessful attempts in Tran/straight cath  -Patient currently urinating in external wick  -Consulted urology, appreciate recs     Chronic diastolic CHF with reduced ejection fraction  -Pro BNP shows 980, lower than past readings  -Echo (4/2019) shows LVEF 45%.  Evidence of diastolic dysfunction  -Echo (12/2020) shows LVEF>55%. Pacemaker present in the right heart chambers. Small LV cavity.  -Lasix 20mg daily, HELD     COPD  -Continue home medication Symbicort BID daily and DuoNeb PRN    Hypokalemia (Resolved)  -Potassium 2.9 (12/4)  -Potassium replaced, rechecked later in evening at 3.9     Diet: General diet  DVT prophylaxis: Lovenox 40mg injection daily  GI prophylaxis: Not indicated  PT/OT/SW     Disposition: Came from [facility]    Mariia Rodarte MD  12/5/2020  8:37 AM     Attending Physician Statement  I have discussed the care of Elizabeth Mayes with the resident team. I have examined the patient myself and taken ros and hpi , including pertinent history and exam findings,  with the resident. I have reviewed the key elements of all parts of the encounter with the resident. I agree with the assessment, plan and orders as documented by the resident. Principal Problem:    Hospital-acquired pneumonia  Active Problems:    CVA (cerebral vascular accident) ([facility] [address]    MRSA colonization    Urinary retention    Elevated C-reactive protein (CRP)    Chronic obstructive pulmonary disease, unspecified (HCC)    Hypotension    Line sepsis (HCC)    Pacemaker    Bacteremia due to Staphylococcus  Resolved Problems:    * No resolved hospital problems.  *        Septic shock from HCAP, staphylococcal capitis line Sepsis, blood cultures positive, on vancomycin, Levaquin per ID, tip cx pending  Shock resolved off Levophed  New right lower lobe mass- pulm consulted  Chronic urinary retention    Awaiting recs from pulm, urology, and ID; and final culture sensitivities      Electronically signed by Neva Higgins MD

## 2020-12-05 NOTE — PROGRESS NOTES
PULMONARY PROGRESS NOTE:    REASON FOR VISIT: pneumonia  Interval History:    Shortness of Breath: no  Cough: no  Sputum: no          Hemoptysis: no  Chest Pain: no  Fever: no                   Swelling Feet: no  Headache: no                                           Nausea, Emesis, Abdominal Pain: no  Diarrhea: no         Constipation: no    Events since last visit: none    PAST MEDICAL HISTORY:      Scheduled Meds:   metoprolol tartrate  50 mg Oral BID    levofloxacin  750 mg Intravenous Q24H    vancomycin (VANCOCIN) intermittent dosing (placeholder)   Other RX Placeholder    vancomycin  1,000 mg Intravenous Q24H    sodium chloride  500 mL Intravenous Once    budesonide-formoterol  2 puff Inhalation BID    divalproex  125 mg Oral BID    traZODone  100 mg Oral Nightly    sertraline  50 mg Oral Daily    mirtazapine  7.5 mg Oral Nightly    levothyroxine  125 mcg Oral Daily    baclofen  10 mg Oral BID    sodium chloride flush  10 mL Intravenous 2 times per day    enoxaparin  40 mg Subcutaneous Daily     Continuous Infusions:   lactated ringers Stopped (12/03/20 1522)    sodium chloride 100 mL/hr at 12/05/20 0416     PRN Meds:perflutren lipid microspheres, sodium chloride flush, ipratropium-albuterol, HYDROcodone-acetaminophen, sodium chloride flush, potassium chloride **OR** potassium alternative oral replacement **OR** potassium chloride, magnesium sulfate, acetaminophen **OR** acetaminophen, polyethylene glycol, promethazine **OR** ondansetron        PHYSICAL EXAMINATION:  BP (!) 100/59   Pulse 112   Temp 98.3 °F (36.8 °C) (Oral)   Resp 16   Ht 5' (1.524 m)   Wt 138 lb 1.6 oz (62.6 kg)   SpO2 98%   BMI 26.97 kg/m²     General : Awake, alert,   Neck - supple, no lymphadenopathy, JVD not raised  Heart - regular rhythm, S1 and S2 normal; no additional sounds heard  Lungs - Air Entry- fair bilaterally; breath sounds : vesicular;   rales/crackles - absent  Abdomen - soft, no tenderness  Upper Extremities  - no cyanosis, mottling; edema : absent  Lower Extremities: no cyanosis, mottling; edema : absent    Current Laboratory, Radiologic, Microbiologic, and Diagnostic studies reviewed  Data ReviewCBC:   Recent Labs     12/03/20 0147 12/04/20 0905 12/05/20  0854   WBC 11.5* 12.5* 11.2*   RBC 3.43* 3.24* 3.13*   HGB 10.2* 9.7* 9.5*   HCT 31.7* 29.5* 28.4*    251 258     BMP:   Recent Labs     12/03/20 0147 12/04/20 0905 12/04/20 2121 12/05/20  0854   GLUCOSE 105* 84  --  95   * 131*  --  132*   K 3.9 2.9* 3.9 3.1*   BUN 24* 9  --  4*   CREATININE 0.63 <0.40*  --  <0.40*   CALCIUM 8.8 8.2*  --  7.8*     ABGs: No results for input(s): PHART, PO2ART, AZW0ESN, AIF4JIT, BEART, G5SPRLSP, UYG3UCV in the last 72 hours.    PT/INR:  No results found for: PTINR    ASSESSMENT / PLAN:    Pneumonia - ABx  F/u CT chest - if does not improve - consider lung biopsy - ? malignant    Electronically signed by Marilia Chavez on 12/05/20 at 4:51 PM.

## 2020-12-05 NOTE — PLAN OF CARE
Problem: Falls - Risk of:  Goal: Will remain free from falls  Description: Will remain free from falls  12/5/2020 1736 by Natasha Maldonado RN  Outcome: Met This Shift  12/5/2020 1624 by Goyo Hill RN  Outcome: Ongoing  12/5/2020 0750 by Sharon Conrad RN  Outcome: Met This Shift  Goal: Absence of physical injury  Description: Absence of physical injury  12/5/2020 1736 by Natasha Maldonado RN  Outcome: Met This Shift  Note: Complete bedrest maintained.  Repositioned every 2 hours  12/5/2020 1624 by Goyo Hill RN  Outcome: Ongoing  12/5/2020 0750 by Sharon Conrad RN  Outcome: Met This Shift     Problem: Skin Integrity:  Goal: Complications related to intravenous access or infusion will be avoided or minimized  Description: Complications related to intravenous access or infusion will be avoided or minimized  12/5/2020 1736 by Natasha Maldonado RN  Outcome: Met This Shift  12/5/2020 1624 by Goyo Hill RN  Outcome: Ongoing  12/5/2020 0750 by Sharon Conrad RN  Outcome: Met This Shift

## 2020-12-05 NOTE — PLAN OF CARE
Problem: Falls - Risk of:  Goal: Will remain free from falls  Description: Will remain free from falls  12/5/2020 0750 by Lindsey Mancini RN  Outcome: Met This Shift  12/4/2020 1757 by El Chaidez  Outcome: Ongoing  Note: Pt had no falls this shift. Call light within reach. Side rails up x2. Bed in lowest position. Patient safety maintained. Goal: Absence of physical injury  Description: Absence of physical injury  12/5/2020 0750 by Lindsey Mancini RN  Outcome: Met This Shift  12/4/2020 1757 by El Chaidez  Outcome: Ongoing  Note: No injuries this shift. Pt safety maintained. Problem: Pain:  Goal: Pain level will decrease  Description: Pain level will decrease  12/5/2020 0750 by Lindsey Mancini RN  Outcome: Met This Shift  12/4/2020 1757 by El Chaidez  Outcome: Ongoing  Note: Patient's pain is controlled with PRN pain meds. Goal: Control of acute pain  Description: Control of acute pain  12/5/2020 0750 by Lindsey Mancini RN  Outcome: Met This Shift  12/4/2020 1757 by El Chaidez  Outcome: Ongoing  Note: Patient's pain is controlled with PRN pain meds. Goal: Control of chronic pain  Description: Control of chronic pain  12/5/2020 0750 by Lindsey Mancini RN  Outcome: Met This Shift  12/4/2020 1757 by El Chaidez  Outcome: Ongoing  Note: Patient's pain is controlled with PRN pain meds. Problem: Skin Integrity:  Goal: Will show no infection signs and symptoms  Description: Will show no infection signs and symptoms  12/5/2020 0750 by Lindsey Mancini RN  Outcome: Met This Shift  12/4/2020 1757 by El Chaidez  Outcome: Ongoing  Note: Skin assessment as charted. No new skin breakdown noted. Goal: Absence of new skin breakdown  Description: Absence of new skin breakdown  12/5/2020 0750 by Lindsey Mancini RN  Outcome: Met This Shift  12/4/2020 1757 by El Chaidez  Outcome: Ongoing  Note: Skin assessment as charted. No new skin breakdown noted.    Goal: Demonstration of wound healing without infection will improve  Description: Demonstration of wound healing without infection will improve  12/5/2020 0750 by Alphonso Cason RN  Outcome: Met This Shift  12/4/2020 1757 by Bettie Morales  Outcome: Ongoing  Goal: Complications related to intravenous access or infusion will be avoided or minimized  Description: Complications related to intravenous access or infusion will be avoided or minimized  12/5/2020 0750 by Alphonso Cason RN  Outcome: Met This Shift  12/4/2020 1757 by Bettie Morales  Outcome: Ongoing     Problem: Cardiac:  Goal: Ability to maintain vital signs within normal range will improve  Description: Ability to maintain vital signs within normal range will improve  12/5/2020 0750 by Alphonso Cason RN  Outcome: Met This Shift  12/4/2020 1757 by Bettie Morales  Outcome: Ongoing  Note: Patient remains free from hypotension. Goal: Cardiovascular alteration will improve  Description: Cardiovascular alteration will improve  12/5/2020 0750 by Alphonso Cason RN  Outcome: Met This Shift  12/4/2020 1757 by Bettie Morales  Outcome: Ongoing     Problem: Physical Regulation:  Goal: Ability to maintain vital signs within normal range will improve  Description: Ability to maintain vital signs within normal range will improve  12/5/2020 0750 by Alphonso Cason RN  Outcome: Met This Shift  12/4/2020 1757 by Bettie Morales  Outcome: Ongoing  Note: Patient remains free from hypotension.   Goal: Complications related to the disease process, condition or treatment will be avoided or minimized  Description: Complications related to the disease process, condition or treatment will be avoided or minimized  12/5/2020 0750 by Alphonso Cason RN  Outcome: Met This Shift  12/4/2020 1757 by Bettie Morales  Outcome: Ongoing  Goal: Diagnostic test results will improve  Description: Diagnostic test results will improve  12/5/2020 0750 by Alphonso Cason RN  Outcome: Met This Shift  12/4/2020 1757 by Roberta Hammond Pa  Outcome: Ongoing     Problem: Nutritional:  Goal: Nutritional status will improve  Description: Nutritional status will improve  12/5/2020 0750 by Haseeb Balderrama RN  Outcome: Met This Shift  12/4/2020 1757 by Alphonso Glass  Outcome: Ongoing  Note: Nutrition status is poor. Pt refusing to eat. Problem: Respiratory:  Goal: Ability to maintain normal respiratory secretions will improve  Description: Ability to maintain normal respiratory secretions will improve  12/5/2020 0750 by Haseeb Balderrama RN  Outcome: Met This Shift  12/4/2020 1757 by Alphonso Glass  Outcome: Ongoing  Note: Respiratory assessment charted. No signs of distress noted.        Problem: HEMODYNAMIC STATUS  Goal: Patient has stable vital signs and fluid balance  12/5/2020 0750 by Haseeb Balderrama RN  Outcome: Met This Shift  12/4/2020 1757 by Alphonso Glass  Outcome: Ongoing     Problem: ACTIVITY INTOLERANCE/IMPAIRED MOBILITY  Goal: Mobility/activity is maintained at optimum level for patient  12/5/2020 0750 by Haseeb Balderrama RN  Outcome: Met This Shift  12/4/2020 1757 by Alphonso Glass  Outcome: Ongoing     Problem: COMMUNICATION IMPAIRMENT  Goal: Ability to express needs and understand communication  12/5/2020 0750 by Haseeb Balderrama RN  Outcome: Met This Shift  12/4/2020 1757 by Alphonso Glass  Outcome: Ongoing     Problem: Nutrition  Goal: Optimal nutrition therapy  Description: Nutrition Problem #1: Inadequate oral intake  Intervention: Food and/or Nutrient Delivery: Continue Current Diet, Start Oral Nutrition Supplement  Nutritional Goals: po intake greater than 50%     12/5/2020 0750 by Haseeb Balderrama RN  Outcome: Met This Shift  12/4/2020 1757 by Alphonso Glass  Outcome: Ongoing     Problem: Health Behavior:  Goal: Will modify at least one risk factor affecting health status  Description: Will modify at least one risk factor affecting health status  12/5/2020 0750 by Haseeb Balderrama RN  Outcome: Ongoing  12/4/2020 1757 by Kraig Thurston  Outcome: Ongoing  Goal: Identification of resources available to assist in meeting health care needs will improve  Description: Identification of resources available to assist in meeting health care needs will improve  12/5/2020 0750 by Lacho Waller RN  Outcome: Ongoing  12/4/2020 1757 by Kraig Thurston  Outcome: Ongoing     Problem: Physical Regulation:  Goal: Will remain free from infection  Description: Will remain free from infection  12/5/2020 0750 by Lacho Waller RN  Outcome: Ongoing  12/4/2020 1757 by Kraig Thurston  Outcome: Ongoing

## 2020-12-05 NOTE — CARE COORDINATION
Social work; spoke to pt and friend Fernandez Brown [phone]. Pt plans to return to [facility] then begin to work with staff to plan for home to move in with friend long term. She will require dme and home care before she can start to think of moving. Ok to talk with friend about planning.   Darin edward

## 2020-12-05 NOTE — PLAN OF CARE
Alert et oriented. No attempts to get out of bed. No injury. Has had pain all day with light touch and bathing , as well as, with gentle movement and turning. Doesn't want to move, but is cooperative and becomes more comfortable rapidly following position changes and during care. Medicated x1 with Norco for pain with fair relief. Coccyx appears red and translucent and looks like there may be a mild abrasion. Mepilex continues. Right heel had dark spot on it earlier, but it resolved with position changes. Heels do still get red and pillows are needed to elevate and prevent decub. Left shoulder with old healing abrasion/scabs loosening. No evidence of new skin breakdown. Appetite is poor. Did get her to drink Ensure x1 at breakfast.  Afebrile. Mild tachycardia today. Lopressor ordered today, but was held due to systolic BP below 873UC/PZ. Urine output is adequate. Friend visited today and they discussed Ivory Kelly coming to live with her today after discharge. Friend is former caregiver and has room for her at home rather than returning to the nursing home. Ivory Kelly is agreeable to this prospect. [id number] planner notified. Poor po intake. Needs much encouragement to eat and drink. Needs assist to get set up and sometimes hold the drink. Patient communicates needs effectively and uses call light appropriately. No respiratory distress observed today. VS stable and assessment stable.

## 2020-12-06 NOTE — PLAN OF CARE
Problem: Falls - Risk of:  Goal: Will remain free from falls  Description: Will remain free from falls  12/6/2020 0122 by Shabana Jones RN  Outcome: Ongoing  12/5/2020 1736 by Moira Romero RN  Outcome: Met This Shift  12/5/2020 1624 by Rosario Garrett RN  Outcome: Ongoing     Problem: Falls - Risk of:  Goal: Absence of physical injury  Description: Absence of physical injury  12/6/2020 0122 by Shabana Jones RN  Outcome: Ongoing  12/5/2020 1736 by Moira Romero RN  Outcome: Met This Shift  Note: Complete bedrest maintained.  Repositioned every 2 hours  12/5/2020 1624 by Rosario Garrett RN  Outcome: Ongoing

## 2020-12-06 NOTE — PROGRESS NOTES
tenderness  Upper Extremities  - no cyanosis, mottling; edema : absent  Lower Extremities: no cyanosis, mottling; edema : absent    Current Laboratory, Radiologic, Microbiologic, and Diagnostic studies reviewed  Data ReviewCBC:   Recent Labs     12/04/20  0905 12/05/20  0854 12/06/20  0551   WBC 12.5* 11.2* 11.7*   RBC 3.24* 3.13* 2.67*   HGB 9.7* 9.5* 8.0*   HCT 29.5* 28.4* 23.9*    258 275     BMP:   Recent Labs     12/04/20  0905 12/04/20 2121 12/05/20  0854 12/06/20  0551   GLUCOSE 84  --  95 91   *  --  132* 137   K 2.9* 3.9 3.1* 3.7   BUN 9  --  4* 4*   CREATININE <0.40*  --  <0.40* <0.40*   CALCIUM 8.2*  --  7.8* 7.7*     ABGs: No results for input(s): PHART, PO2ART, FOZ7LLO, MFL2CSO, BEART, M4EUQSSP, DCR3KXZ in the last 72 hours. PT/INR:  No results found for: PTINR    ASSESSMENT / PLAN:    Pneumonia - ABx  F/u CT chest I 4-6 weeks - if does not improve - consider lung biopsy - ?  Malignant - d/w patient/ family    Electronically signed by Denis Melgar on 12/06/20 at 12:02 PM.

## 2020-12-06 NOTE — CARE COORDINATION
ONGOING DISCHARGE PLAN:    LSW following for return to [facility]. Patient wants to move in with her friend lisa  Who is agreeable to this. Writer advised Nicolettesaray Hutton and her friend Scarlett Hairston that patient would need to go back to [employer] and work with a  on transition her to [facility]. Several DME equipment would need to be ordered and delivered to her home. Hoa Munoz advised she has to move her bedroom upstairs so Red Hurst can have the 1st floor bedroom. lisa  Advised she approx 1 -2 weeks to move furniture to the 2nd floor.      Patient was agreeable to go back to [facility] until lisa's home is ready for her to move in. Per previous LSW notes - Patient is a bed hold and no pre cert is needed to return     remains on Iv Levaquin, Iv fluids, Iv vanco    Will continue to follow for additional discharge needs.     Electronically signed by Fracisco Egan RN on 12/6/2020 at 9:56 AM

## 2020-12-06 NOTE — PROGRESS NOTES
Pharmacy Vancomycin Consult     Vancomycin Day: 4  Current Dosin mg every 24 hours    Temp max:  99 F    Recent Labs     20  0854 20  0551   BUN 4* 4*       Recent Labs     20  0854 20  0551   CREATININE <0.40* <0.40*       Recent Labs     20  0854 20  0551   WBC 11.2* 11.7*         Intake/Output Summary (Last 24 hours) at 2020 0682  Last data filed at 2020 1735  Gross per 24 hour   Intake 1510 ml   Output --   Net 1510 ml       Culture Date      Source                       Results  See micro    Ht Readings from Last 1 Encounters:   20 5' (1.524 m)        Wt Readings from Last 1 Encounters:   20 142 lb 4.8 oz (64.5 kg)         Body mass index is 27.79 kg/m². CrCl cannot be calculated (This lab value cannot be used to calculate CrCl because it is not a number: <0.40). Est CrCl  91 ml/min     Trough: 11.6     Assessment/Plan:  Vancomycin trough subtherapeutic, desired goal 15-20  This morning's dose infusing now, will change to Vancomycin 1000 mg every 18  hours to better target trough goal  Will need to recheck level soon    Kyara Wooten.  [address]Deisi Rondon 2020 7:35 AM

## 2020-12-06 NOTE — PROGRESS NOTES
[facility] [facility] [employer]    PROGRESS NOTE             12/6/2020    7:30 AM    Name:   Nova Singh  MRN:     [id number]     Acct:      [de-identified]   Room:   2098/2098-01   Day:  3  Admit Date:  12/3/2020  1:20 AM    PCP:  Puma Anderson MD  Code Status:  Full Code    Subjective:     C/C:   Chief Complaint   Patient presents with    Fatigue     Interval History Status:     Patient was seen and evaluated by bedside. No acute events overnight. Patient was alert and oriented x3. Denies shortness of breath, chest pain, abdominal pain, dysuria, fever. Patient is mildly tachycardic, heart rate and the low 100s. Metoprolol home dose on board, restarted yesterday. Patient's recorded urine output 300ml over 24hours, dark yellow in external wick. RN will complete bladder scan to assess urinary retention status. continues to report decreased appetite. Per RN note and physical examination, patient has multiple pressure points and abrasions. Stage II wound on coccyx. Patient currently on IV vancomycin day 4, dosed by pharmacy. Levaquin day 3. Followed by ID, appreciate further recommendations. Recent blood cultures showed no growth 13 hours. Catheter tip culture shows staph coagulase-negative. Previous 2 blood culture shows growth of staph capitis and staph hominis. Brief History:     Please review H&P    Review of Systems:     Review of Systems   Constitutional: Positive for appetite change. Negative for activity change, chills and fever. Decrease food and drink consumption. Pain over whole body. HENT: Negative for congestion. Respiratory: Negative for chest tightness and wheezing. Cardiovascular: Negative for chest pain, palpitations and leg swelling. Gastrointestinal: Negative for abdominal pain, constipation, diarrhea and nausea. Genitourinary: Negative for difficulty urinating and dysuria.         Urinary retention History reviewed. No pertinent family history. Vitals:  /62   Pulse 104   Temp 97.7 °F (36.5 °C) (Oral)   Resp 16   Ht 5' (1.524 m)   Wt 142 lb 4.8 oz (64.5 kg)   SpO2 96%   BMI 27.79 kg/m²   Temp (24hrs), Av.2 °F (36.8 °C), Min:97.7 °F (36.5 °C), Max:99 °F (37.2 °C)    No results for input(s): POCGLU in the last 72 hours. I/O(24Hr):     Intake/Output Summary (Last 24 hours) at 2020 0730  Last data filed at 2020 0725  Gross per 24 hour   Intake 2860 ml   Output 300 ml   Net 2560 ml       Labs:    CBC with Differential:    Lab Results   Component Value Date    WBC 11.7 2020    RBC 2.67 2020    RBC 3.66 2012    HGB 8.0 2020    HCT 23.9 2020     2020     2012    MCV 89.4 2020    MCH 29.8 2020    MCHC 33.3 2020    RDW 15.1 2020    METASPCT 1 2019    LYMPHOPCT 8 2020    MONOPCT 3 2020    MYELOPCT 2 2020    BASOPCT 0 2020    MONOSABS 0.34 2020    LYMPHSABS 0.90 2020    EOSABS 0.22 2020    BASOSABS 0.00 2020    DIFFTYPE NOT REPORTED 2020     BMP:    Lab Results   Component Value Date     2020    K 3.7 2020     2020    CO2 22 2020    BUN 4 2020    LABALBU 2.3 2020    LABALBU 4.6 2012    CREATININE <0.40 2020    CALCIUM 7.7 2020    GFRAA CANNOT BE CALCULATED 2020    LABGLOM CANNOT BE CALCULATED 2020    GLUCOSE 91 2020    GLUCOSE 87 2012       Lab Results   Component Value Date/Time    SPECIAL RIGHT AC10 ML 2020 12:54 PM    SPECIAL RIGHT WRIST 3ML 2020 12:54 PM     Lab Results   Component Value Date/Time    CULTURE NO GROWTH 13 HOURS 2020 12:54 PM    CULTURE NO GROWTH 13 HOURS 2020 12:54 PM         Radiology:    Ct Chest Wo Contrast    Result Date: 2020  EXAMINATION: CT OF THE CHEST WITHOUT CONTRAST 2020 3:08 pm TECHNIQUE: CT of the chest was performed without the administration of intravenous contrast. Multiplanar reformatted images are provided for review. Dose modulation, iterative reconstruction, and/or weight based adjustment of the mA/kV was utilized to reduce the radiation dose to as low as reasonably achievable. COMPARISON: Chest radiograph 12/03/2020, CT abdomen and pelvis 12/03/2020. HISTORY: ORDERING SYSTEM PROVIDED HISTORY: pneumonia TECHNOLOGIST PROVIDED HISTORY: pneumonia Reason for Exam: f/u pneumonia Acuity: Unknown Type of Exam: Unknown FINDINGS: Mediastinum: Suspected 17 mm lymph node posterior to the left medial clavicle appears to have increased in size from a 2012 chest CT. No other definite lymphadenopathy. No pericardial effusion. Coronary artery calcifications are seen. The thoracic aorta is nonaneurysmal.  The main pulmonary artery is not significantly dilated. Right IJ CVC catheter tip is in the SVC. There is a left chest wall cardiac device with multiple leads. Lungs/pleura: The central airways are patent. Small bilateral pleural effusions. No pneumothorax is seen. Emphysematous changes are seen in the lungs. Similar appearance to the CT abdomen and pelvis from 12/03/2020, there is a cavitary mass in the right lower lobe abutting the pleura, measuring 4.6 x 4.4 x 3.5 cm. There is dependent atelectasis in the right lung. There is a rounded opacity in the left lower lobe broadly abutting the pleura. Additional airspace opacities seen in the left lower lobe. There are also areas of atelectasis in the left lingula and left lower lobe. 3 mm right upper lobe pulmonary nodule (series 601, image 60). Upper Abdomen: Limited images of the upper abdomen are unremarkable. 4 mm hypodensity in the liver is too small to characterize but also seen on the comparison abdomen CT (series 2, image 111). Soft Tissues/Bones: No acute bony abnormality. There are old left-sided rib fractures.      Approximately 4.6 x 4.4 x 3.5 Fatigue, abd pain. Due to patient condition, unable to have patient lay flat  or bring arms above head Acuity: Acute Type of Exam: Initial Relevant Medical/Surgical History: History, Gtube/peg tube placement, cholecystectomy FINDINGS: Lower Chest: Partially visualized left lower lobe heterogeneous consolidation. Trace left pleural fluid with pleural thickening. Right lower lobe 4.2 x 3.3 cm masslike opacity with central necrosis, central air-fluid level and mild surrounding heterogeneous/nodular opacities. Emphysema. Partially visualized cardiac pacer leads. Atherosclerosis of the visualized thoracic aorta. Liver: Few scattered subcentimeter hypodensities in the liver are similar to the prior study and likely represent benign cysts. No new suspicious liver mass. Smooth liver contour. Gallbladder and Bile Ducts: Prior cholecystectomy. Spleen: Normal. Adrenal Glands: Normal. Pancreas: Normal. Genitourinary: Normal. Bowel: The stomach is incompletely distended and not well evaluated. Postsurgical changes of the bowel. Normal caliber bowel. Fluid throughout the colon. Vasculature: Atherosclerosis. No abdominal aortic aneurysm. Patent main portal vein. Bones and Soft Tissues: Osteopenia. Degenerative changes in the visualized spine and pelvis. L3 superior endplate 78-94% height loss was not present on the prior study but appears to be chronic. Retroperitoneum/Mesentery: No intraperitoneal free air, ascites or fluid collection. No lymphadenopathy in the abdomen or pelvis. Fluid throughout the colon suggests enteritis. No bowel obstruction. Postsurgical changes of the bowel. Partially visualized left lower lobe heterogeneous consolidation with trace left basilar pleural fluid and pleural thickening. There is also a cavitary 4.3 cm mass in the right lower lobe which demonstrates a small air-fluid level. Findings may relate to infection. Underlying neoplastic process is not excluded.   Short-term follow-up chest CT may be helpful for further evaluation. Xr Chest Portable    Result Date: 12/3/2020  EXAMINATION: ONE XRAY VIEW OF THE CHEST 12/3/2020 5:17 am COMPARISON: Earlier same day. HISTORY: ORDERING SYSTEM PROVIDED HISTORY: post RIJ central line TECHNOLOGIST PROVIDED HISTORY: post RIJ central line Reason for Exam: Right central line placement. Acuity: Acute Type of Exam: Initial Additional signs and symptoms: Right central line placement. FINDINGS: Interval placement of a right IJ central venous catheter with tip in the mid SVC. No pneumothorax. Redemonstration of low left lung volume and hyperexpanded right lung volume. Similar appearing left greater than right basilar airspace disease with left basilar consolidation and right lateral basilar masslike opacity. Small left pleural effusion. Stable left pectoral trans venous dual-chamber cardiac pacer device. Stable cardiomediastinal silhouette and great vessels. Interval placement of a right IJ central venous catheter with tip in the mid SVC. No pneumothorax. Otherwise, no significant interval change with redemonstration of bilateral basilar airspace disease with left basilar consolidation and right lateral basilar masslike opacity. Continued attention on follow-up recommended. Small left pleural effusion. Xr Chest Portable    Result Date: 12/3/2020  EXAMINATION: ONE XRAY VIEW OF THE CHEST 12/3/2020 2:20 am COMPARISON: May 29, 2019. HISTORY: ORDERING SYSTEM PROVIDED HISTORY: weakness TECHNOLOGIST PROVIDED HISTORY: weakness Reason for Exam: Pt c/o weakness, low blood pressure. Acuity: Acute Type of Exam: Initial Additional signs and symptoms: Pt c/o weakness, low blood pressure. FINDINGS: Frontal portable view of the chest.  Low left lung volume. Hyperexpanded right lung volume. Leftward shift of the mediastinal structures. Left greater than right basilar heterogeneous opacities. Small bilateral pleural effusions versus pleural thickening.   No pneumothorax. Atherosclerotic thoracic aorta. No significant cardiomegaly. Left pectoral trans venous dual-chamber cardiac pacer device. Osteopenia. Multilevel degenerative disc disease. Left greater than right basilar heterogeneous opacities. Differential considerations include atelectasis/scarring, asymmetric pulmonary edema and an infectious/inflammatory process. Follow-up PA and lateral chest radiographs or chest CT may be helpful for further evaluation as warranted. Small bilateral pleural effusions versus pleural thickening. Low left lung volume with leftward shift of the mediastinal structures. Hyperexpanded right lung volume. Physical Examination:        Physical Exam  Vitals signs reviewed. Constitutional:       General: She is not in acute distress. Appearance: She is normal weight. HENT:      Head: Normocephalic. Mouth/Throat:      Mouth: Mucous membranes are moist.   Cardiovascular:      Rate and Rhythm: Normal rate and regular rhythm. Heart sounds: Normal heart sounds. No murmur. No gallop. Pulmonary:      Effort: Pulmonary effort is normal. No respiratory distress. Breath sounds: Normal breath sounds. Chest:      Chest wall: No tenderness. Abdominal:      General: Bowel sounds are normal.      Palpations: Abdomen is soft. Tenderness: There is no abdominal tenderness. There is no guarding. Comments: Surgical incision along mid-abdomen. Genitourinary:     Comments: Urinary retention, but excreting urine in external wick  Musculoskeletal:      Right lower leg: No edema. Left lower leg: No edema. Skin:     General: Skin is warm and dry. Comments: Sensitive to light touch, causes her pain. Neurological:      Mental Status: She is alert and oriented to person, place, and time.            Assessment:        Primary Problem  Hospital-acquired pneumonia    Active Hospital Problems    Diagnosis Date Noted    Pacemaker [Z95.0] 12/05/2020  Bacteremia due to Staphylococcus [R78.81, B95.8]     Line sepsis ([facility] [address] [T85.79XA, A41.9] 12/04/2020    Hypotension [I95.9] 12/03/2020    Hospital-acquired pneumonia [J18.9, Y95] 12/03/2020    Chronic obstructive pulmonary disease, unspecified ([facility] [address] [J44.9] 05/24/2019    Elevated C-reactive protein (CRP) [R79.82]     Urinary retention [R33.9]     MRSA colonization [Z22.322]     CVA (cerebral vascular accident) ([facility] [address] [I63.9]        Plan:        Septic shock 2/2 to line sepsis vs HAP (improving)  -Present to ER with MAP<65 despite 250cc NS bolus at [facility] and IV fluid in ER  -100mL/hr NS fluid  -Levophed discontinued, BP stable  -Triple lumen catheter placed in ER, removed 12/4  -2 blood cultures positive for Staphylococcus capitis, staph hominis  -Old abdominal indwelling short IV line removed and had tip cultured. CT abdomen shows ending of line into subcutaneous tissue.  -Cath tip culture show staph species, coagulase neg  -2 more blood cultures ordered, no growth 13hrs  -Per ID recs, discontinue cefepime. Start Levaquin. Continue vancomycin .     Hospital-acquired pneumonia  -In ER, mild leukocytosis WBC 12.6, 11.5  -Resides in an ECF  -CXR shows bilateral basilar airspace disease with left basilar consolidation and right lateral basilar masslike opacity  -CT abdomen pelvis shows left lower lobe heterogeneous consolidation with trace left basilar pleural fluid and pleural thickening.  4.3 cm mass in right lower lobe demonstrating small air-fluid level, possibly infection or neoplastic process. -Pro-calcitonin wnl 0.08, CRP elevated 156.6  -Viral panel negative  -Legionella neg  -Blood culture positive with gram positive cocci in clusters, coagulase negative staphylococcus. Staph capitis, Staph hominis  -IV vancomycin day 4. Levaquin day 3.  Was on Cefepime day 2, dc 12/4.  -Elevated mycoplasma 1.22, MRSA swab positive     Possible lung malignancy  -CT chest shows RLL mass 4.6 x 4.4 x 3.3, possibly cavitating pneumonia versus neoplasm. Round opacity in LLL, possibly atelectasis or infection. Small bilateral pleural effusion. 3 mm RUL nodule. Enlarged lymph in left upper mediastinum posterior to medial left clavicle. -Pro calcitonin wnl  -History smoking, 30 pack year. 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 Follow up with CT chest. If no improvement, consider lung biopsy.     Urinary retention  -Bladder scan shows 334 mL of urine  -Multiple unsuccessful attempts in Tran/straight cath  -Patient currently urinating in external wick, 300ml over 24hr  -Order bladder scan  -Consulted urology, appreciate recs      Chronic diastolic CHF with reduced ejection fraction  -Pro BNP shows 980, lower than past readings  -Echo (4/2019) shows LVEF 45%.  Evidence of diastolic dysfunction  -Echo (12/2020) shows LVEF>55%. Pacemaker present in the right heart chambers. Small LV cavity.  -Lasix 20mg daily, HELD     COPD  -Continue home medication Symbicort BID daily and DuoNeb PRN     Hypokalemia (Resolved)  -Potassium 2.9 (12/4)  -Potassium replaced, rechecked later in evening at 3.9     Diet: General diet  DVT prophylaxis: Lovenox 40mg injection daily  GI prophylaxis: Not indicated  PT/OT/SW     Disposition: Came from [facility], patient is a bed hold. No pre-cert needed. Melva Nelson MD  12/6/2020  7:30 AM     Attending Physician Statement  I have discussed the care of Zaire Diamond with the resident team. I have examined the patient myself and taken ros and hpi , including pertinent history and exam findings,  with the resident. I have reviewed the key elements of all parts of the encounter with the resident. I agree with the assessment, plan and orders as documented by the resident.     Principal Problem:    Hospital-acquired pneumonia  Active Problems:    CVA (cerebral vascular accident) ([facility] [address]    MRSA colonization    Urinary retention    Elevated C-reactive protein (CRP)    Chronic obstructive pulmonary disease, unspecified (HCC)    Hypotension    Line sepsis (HCC)    Pacemaker    Bacteremia due to Staphylococcus  Resolved Problems:    * No resolved hospital problems. *    Septic shock from HCAP, staphylococcal capitis line Sepsis, blood cultures positive, on vancomycin, Levaquin per ID, tip cx pending  Shock resolved off Levophed  New right lower lobe mass- pulm consulted  Chronic urinary retention    Hypotension resolved, no resp symptoms  No need for repeat CT chest- will get Biopsy out patient. Repeat bld cultures pending- c/w vanc and levaquin per ID  UO good with external catheter, no significant residuals on bladder scan    Discharge planning per bld cx results,ID recs.        Electronically signed by Sean Larose MD

## 2020-12-06 NOTE — PLAN OF CARE
Problem: Falls - Risk of:  Goal: Will remain free from falls  Description: Will remain free from falls  12/6/2020 0123 by Derek Holguin RN  Outcome: Ongoing  12/6/2020 0122 by Derek Holguin RN  Outcome: Ongoing  12/5/2020 1736 by Mikey Liang RN  Outcome: Met This Shift  12/5/2020 1624 by Ganesh Amador RN  Outcome: Ongoing     Problem: Falls - Risk of:  Goal: Will remain free from falls  Description: Will remain free from falls  12/5/2020 1736 by Mikey Liang RN  Outcome: Met This Shift     Problem: Falls - Risk of:  Goal: Absence of physical injury  Description: Absence of physical injury  12/6/2020 0123 by Derek Holguin RN  Outcome: Ongoing  12/6/2020 0122 by Derek Holguin RN  Outcome: Ongoing  12/5/2020 1736 by Mikey Liang RN  Outcome: Met This Shift  Note: Complete bedrest maintained. Repositioned every 2 hours  12/5/2020 1624 by Ganesh Amador RN  Outcome: Ongoing   Pt has had zero falls or injuries so far this shift.

## 2020-12-06 NOTE — PLAN OF CARE
Problem: Falls - Risk of:  Goal: Will remain free from falls  Description: Will remain free from falls  12/6/2020 1505 by Edwina Campos RN  Outcome: Met     Problem: Pain:  Goal: Pain level will decrease  Description: Pain level will decrease  12/6/2020 1505 by Edwina Campos RN  Outcome: Ongoing   Pain assessed on rounds. No pharm and pharm measures utilized. Pt given norco one time this shift so far. Pt satisfied. Problem: Skin Integrity:  Goal: Will show no infection signs and symptoms  Description: Will show no infection signs and symptoms  12/6/2020 1505 by Edwina Campos RN  Outcome: Ongoing   Head to toe charted. Pt has multiple pressure injury spots that were present on admission. Meplex's and pillow support with repositioning utilized. Problem: Cardiac:  Goal: Ability to maintain vital signs within normal range will improve  Description: Ability to maintain vital signs within normal range will improve  12/6/2020 1505 by Edwina Campos RN  Outcome: Ongoing   Vitals managed and monitored. Bp meds as ordered/needed. Problem: Physical Regulation:  Goal: Ability to maintain vital signs within normal range will improve  Description: Ability to maintain vital signs within normal range will improve  12/6/2020 1505 by Edwina Campos RN  Outcome: Ongoing   Pt flaccid on left side d/t stroke hx. Pt dependant for repositioning. Problem: Nutritional:  Goal: Nutritional status will improve  Description: Nutritional status will improve  12/6/2020 1505 by Edwina Campos RN  Outcome: Ongoing   Encourage intake of meals. Pt eating less than 50% of meals. Continuous fluids running.

## 2020-12-07 NOTE — PLAN OF CARE
Problem: Falls - Risk of:  Goal: Will remain free from falls  Description: Will remain free from falls  12/6/2020 2256 by Carl Angelo RN  Outcome: Met This Shift  12/6/2020 1505 by Christain Klinefelter, RN  Outcome: Ongoing  Goal: Absence of physical injury  Description: Absence of physical injury  12/6/2020 2256 by Carl Angelo RN  Outcome: Met This Shift  12/6/2020 1505 by Christain Klinefelter, RN  Outcome: Ongoing     Problem: Pain:  Goal: Pain level will decrease  Description: Pain level will decrease  12/6/2020 2256 by Carl Angelo RN  Outcome: Met This Shift  12/6/2020 1505 by Christain Klinefelter, RN  Outcome: Ongoing  Goal: Control of acute pain  Description: Control of acute pain  12/6/2020 2256 by Carl Angelo RN  Outcome: Met This Shift  12/6/2020 1505 by Christain Klinefelter, RN  Outcome: Ongoing  Goal: Control of chronic pain  Description: Control of chronic pain  12/6/2020 2256 by Carl Angelo RN  Outcome: Met This Shift  12/6/2020 1505 by Christain Klinefelter, RN  Outcome: Ongoing     Problem: Skin Integrity:  Goal: Will show no infection signs and symptoms  Description: Will show no infection signs and symptoms  12/6/2020 2256 by Carl Angelo RN  Outcome: Met This Shift  12/6/2020 1505 by Christain Klinefelter, RN  Outcome: Ongoing  Goal: Absence of new skin breakdown  Description: Absence of new skin breakdown  12/6/2020 2256 by Carl Angelo RN  Outcome: Met This Shift  12/6/2020 1505 by Christain Klinefelter, RN  Outcome: Ongoing  Goal: Demonstration of wound healing without infection will improve  Description: Demonstration of wound healing without infection will improve  12/6/2020 2256 by Carl Angelo RN  Outcome: Met This Shift  12/6/2020 1505 by Christain Klinefelter, RN  Outcome: Ongoing  Goal: Complications related to intravenous access or infusion will be avoided or minimized  Description: Complications related to intravenous access or infusion will be avoided or minimized  12/6/2020 2256 by Maria Esther Cronin RN  Outcome: Met This Shift  12/6/2020 1505 by Jaosn Iraheta RN  Outcome: Ongoing     Problem: Cardiac:  Goal: Ability to maintain vital signs within normal range will improve  Description: Ability to maintain vital signs within normal range will improve  12/6/2020 2256 by Maria Esther Cronin RN  Outcome: Met This Shift  12/6/2020 1505 by Jason Iraheta RN  Outcome: Ongoing  Goal: Cardiovascular alteration will improve  Description: Cardiovascular alteration will improve  12/6/2020 2256 by Maria Esther Cronin RN  Outcome: Met This Shift  12/6/2020 1505 by Jason Iraheta RN  Outcome: Ongoing     Problem: Health Behavior:  Goal: Will modify at least one risk factor affecting health status  Description: Will modify at least one risk factor affecting health status  12/6/2020 2256 by Maria Esther Cronin RN  Outcome: Met This Shift  12/6/2020 1505 by Jason Iraheta RN  Outcome: Ongoing  Goal: Identification of resources available to assist in meeting health care needs will improve  Description: Identification of resources available to assist in meeting health care needs will improve  12/6/2020 2256 by Maria Esther Cronin RN  Outcome: Met This Shift  12/6/2020 1505 by Jason Iraheta RN  Outcome: Ongoing     Problem: Physical Regulation:  Goal: Ability to maintain vital signs within normal range will improve  Description: Ability to maintain vital signs within normal range will improve  12/6/2020 2256 by Maria Esther Cronin RN  Outcome: Met This Shift  12/6/2020 1505 by Jason Iraheta RN  Outcome: Ongoing  Goal: Complications related to the disease process, condition or treatment will be avoided or minimized  Description: Complications related to the disease process, condition or treatment will be avoided or minimized  12/6/2020 2256 by Maria Esther Cronin RN  Outcome: Met This Shift  12/6/2020 1505 by Jason Iraheta RN  Outcome: Ongoing  Goal: Diagnostic test results will improve  Description: Diagnostic test results will improve  12/6/2020 2256 by Mason Sutton RN  Outcome: Met This Shift  12/6/2020 1505 by Jose Manuel Robles RN  Outcome: Ongoing  Goal: Will remain free from infection  Description: Will remain free from infection  12/6/2020 2256 by Mason Sutton RN  Outcome: Met This Shift  12/6/2020 1505 by Jose Manuel Robles RN  Outcome: Ongoing     Problem: Nutritional:  Goal: Nutritional status will improve  Description: Nutritional status will improve  12/6/2020 2256 by Mason Sutton RN  Outcome: Met This Shift  12/6/2020 1505 by Jose Manuel Robles RN  Outcome: Ongoing     Problem: Respiratory:  Goal: Ability to maintain normal respiratory secretions will improve  Description: Ability to maintain normal respiratory secretions will improve  12/6/2020 2256 by Mason Sutton RN  Outcome: Met This Shift  12/6/2020 1505 by Jose Manuel Robles RN  Outcome: Ongoing     Problem: HEMODYNAMIC STATUS  Goal: Patient has stable vital signs and fluid balance  12/6/2020 2256 by Mason Sutton RN  Outcome: Met This Shift  12/6/2020 1505 by Jose Manuel Robles RN  Outcome: Ongoing     Problem: COMMUNICATION IMPAIRMENT  Goal: Ability to express needs and understand communication  12/6/2020 2256 by Mason Sutton RN  Outcome: Met This Shift  12/6/2020 1505 by Jose Manuel Robles RN  Outcome: Ongoing     Problem: Nutrition  Goal: Optimal nutrition therapy  Description: Nutrition Problem #1: Inadequate oral intake  Intervention: Food and/or Nutrient Delivery: Continue Current Diet, Start Oral Nutrition Supplement  Nutritional Goals: po intake greater than 50%     12/6/2020 2256 by Mason Sutton RN  Outcome: Met This Shift  12/6/2020 1505 by Jose Manuel Robles RN  Outcome: Ongoing     Problem: ACTIVITY INTOLERANCE/IMPAIRED MOBILITY  Goal: Mobility/activity is maintained at optimum level for patient  12/6/2020 2256 by Mason Sutton RN  Outcome: Met This Shift  12/6/2020 1505 by Jose Manuel Robles RN  Outcome: Ongoing

## 2020-12-07 NOTE — CARE COORDINATION
Plan is still to return to [facility], she is a bed hold under the Medicaid portion of her insurance and can return when she is ready/DC'd, no pre-cert required.

## 2020-12-07 NOTE — PROGRESS NOTES
[address]    PROGRESS NOTE             12/7/2020    8:54 AM    Name:   Donna Escobar  MRN:     [id number]     Acct:      [de-identified]   Room:   2098/2098-01   Day:  4  Admit Date:  12/3/2020  1:20 AM    PCP:  Andrea Pan MD  Code Status:  Full Code    Subjective:     C/C:   Chief Complaint   Patient presents with    Fatigue     Interval History Status: not changed. Patient seen and examined at bedside this morning. No acute events noted overnight. Patient is resting comfortably with stable vital signs; however, patient is difficult to arouse. Noted that blood culture sensitivities are back showing intermediate resistance to Levaquin, will follow ID recommendations. Given penicillin allergy, likely alternative will be Bactrim versus gentamicin. Brief History:     66-year-old female presenting from [facility] care facility with hypotension. Currently treated for staph epidermidis sepsis secondary to indwelling subcutaneous catheters in abdomen. Right-sided pulmonary cavitary lesion found on CT. Review of Systems:     Review of Systems   Unable to perform ROS: Patient unresponsive     Patient resting comfortably in bed with stable vital signs. Patient likely sedated secondary to polypharmacy. Medications: Allergies:     Allergies   Allergen Reactions    Penicillins Shortness Of Breath and Rash    Amoxicillin        Current Meds:   Scheduled Meds:    traZODone  50 mg Oral Nightly    vancomycin  1,000 mg Intravenous Q18H    metoprolol tartrate  50 mg Oral BID    levofloxacin  750 mg Intravenous Q24H    vancomycin (VANCOCIN) intermittent dosing (placeholder)   Other RX Placeholder    sodium chloride  500 mL Intravenous Once    budesonide-formoterol  2 puff Inhalation BID    divalproex  125 mg Oral BID    sertraline  50 mg Oral Daily    mirtazapine  7.5 mg Oral Nightly    levothyroxine  125 mcg Oral Daily    baclofen  10 mg Oral BID    sodium chloride flush  10 mL Intravenous 2 times per day    enoxaparin  40 mg Subcutaneous Daily     Continuous Infusions:    sodium chloride 100 mL/hr at 20 2330     PRN Meds: perflutren lipid microspheres, sodium chloride flush, ipratropium-albuterol, HYDROcodone-acetaminophen, sodium chloride flush, potassium chloride **OR** potassium alternative oral replacement **OR** potassium chloride, magnesium sulfate, acetaminophen **OR** acetaminophen, polyethylene glycol, promethazine **OR** ondansetron    Data:     Past Medical History:   has a past medical history of Abnormal computed tomography of cervical spine, CVA (cerebral vascular accident) ([address] GERD (gastroesophageal reflux disease), Hypertension, Paraproteinemia, and Weight loss. Social History:   reports that she has quit smoking. She has a 30.00 pack-year smoking history. She has never used smokeless tobacco. She reports previous alcohol use of about 28.0 standard drinks of alcohol per week. She reports that she does not use drugs. Family History: History reviewed. No pertinent family history. Vitals:  BP (!) 101/46   Pulse 90   Temp 98.2 °F (36.8 °C) (Oral)   Resp 18   Ht 5' (1.524 m)   Wt 145 lb 8 oz (66 kg)   SpO2 95%   BMI 28.42 kg/m²   Temp (24hrs), Av.4 °F (36.9 °C), Min:98.1 °F (36.7 °C), Max:98.6 °F (37 °C)    No results for input(s): POCGLU in the last 72 hours. I/O(24Hr):     Intake/Output Summary (Last 24 hours) at 2020 0855  Last data filed at 2020 0414  Gross per 24 hour   Intake 2180 ml   Output 695 ml   Net 1485 ml       Labs:    [unfilled]    Lab Results   Component Value Date/Time    SPECIAL RIGHT AC10 ML 2020 12:54 PM    SPECIAL RIGHT WRIST 3ML 2020 12:54 PM     Lab Results   Component Value Date/Time    CULTURE NO GROWTH 1 DAY 2020 12:54 PM    CULTURE NO GROWTH 1 DAY 2020 12:54 PM       [unfilled]    Radiology:    Ct Chest Wo Contrast    Result Date: 12/6/2020  EXAMINATION: CT OF THE CHEST WITHOUT CONTRAST 12/4/2020 3:08 pm TECHNIQUE: CT of the chest was performed without the administration of intravenous contrast. Multiplanar reformatted images are provided for review. Dose modulation, iterative reconstruction, and/or weight based adjustment of the mA/kV was utilized to reduce the radiation dose to as low as reasonably achievable. COMPARISON: Chest radiograph 12/03/2020, CT abdomen and pelvis 12/03/2020. HISTORY: ORDERING SYSTEM PROVIDED HISTORY: pneumonia TECHNOLOGIST PROVIDED HISTORY: pneumonia Reason for Exam: f/u pneumonia Acuity: Unknown Type of Exam: Unknown FINDINGS: Mediastinum: Suspected 17 mm lymph node posterior to the left medial clavicle appears to have increased in size from a 2012 chest CT. No other definite lymphadenopathy. No pericardial effusion. Coronary artery calcifications are seen. The thoracic aorta is nonaneurysmal.  The main pulmonary artery is not significantly dilated. Right IJ CVC catheter tip is in the SVC. There is a left chest wall cardiac device with multiple leads. Lungs/pleura: The central airways are patent. Small bilateral pleural effusions. No pneumothorax is seen. Emphysematous changes are seen in the lungs. Similar appearance to the CT abdomen and pelvis from 12/03/2020, there is a cavitary mass in the right lower lobe abutting the pleura, measuring 4.6 x 4.4 x 3.5 cm. There is dependent atelectasis in the right lung. There is a rounded opacity in the left lower lobe broadly abutting the pleura. Additional airspace opacities seen in the left lower lobe. There are also areas of atelectasis in the left lingula and left lower lobe. 3 mm right upper lobe pulmonary nodule (series 601, image 60). Upper Abdomen: Limited images of the upper abdomen are unremarkable. 4 mm hypodensity in the liver is too small to characterize but also seen on the comparison abdomen CT (series 2, image 111). Soft Tissues/Bones: No acute bony abnormality. There are old left-sided rib fractures. Approximately 4.6 x 4.4 x 3.5 cm cavitary right lower lobe mass, similar to the recent abdomen and pelvis CT from 12/03/2020. This is new from a CT dated 07/25/2019. This could represent a cavitary pneumonia however a primary lung neoplasm is not excluded. Recommend short-term follow-up CT after treatment to demonstrate interval change. Rounded opacity in the left lower lobe broadly abutting the pleura could represent rounded atelectasis or infection. Additional airspace opacities in the left lower lobe are suspicious for infection. This area can also be followed up with CT. Small bilateral pleural effusions. 3 mm right upper lobe pulmonary nodule. Follow-up recommendations are below. Suspected enlarged lymph node in the left upper mediastinum posterior to the medial left clavicle, nonspecific. RECOMMENDATIONS: Fleischner Society guidelines for follow-up and management of incidentally detected pulmonary nodules: Nodule size less than 6 mm In a low-risk patient, no routine follow-up. In a high-risk patient, optional CT at 12 months. - Low risk patients include individuals with minimal or absent history of smoking and other known risk factors. - High risk patients include individuals with a history or smoking or known risk factors. Radiology 2017 http://pubs. rsna.org/doi/full/10.1148/radiol. 4743659786     Ct Abdomen Pelvis W Iv Contrast Additional Contrast? None    Result Date: 12/3/2020  EXAMINATION: CT OF THE ABDOMEN AND PELVIS WITH CONTRAST 12/3/2020 2:49 am TECHNIQUE: CT of the abdomen and pelvis was performed with the administration of intravenous contrast. Multiplanar reformatted images are provided for review. Dose modulation, iterative reconstruction, and/or weight based adjustment of the mA/kV was utilized to reduce the radiation dose to as low as reasonably achievable. COMPARISON: August 13, 2019.  HISTORY: ORDERING SYSTEM PROVIDED HISTORY: global abdominal pain TECHNOLOGIST PROVIDED HISTORY: global abdominal pain Reason for Exam: Fatigue, abd pain. Due to patient condition, unable to have patient lay flat  or bring arms above head Acuity: Acute Type of Exam: Initial Relevant Medical/Surgical History: History, Gtube/peg tube placement, cholecystectomy FINDINGS: Lower Chest: Partially visualized left lower lobe heterogeneous consolidation. Trace left pleural fluid with pleural thickening. Right lower lobe 4.2 x 3.3 cm masslike opacity with central necrosis, central air-fluid level and mild surrounding heterogeneous/nodular opacities. Emphysema. Partially visualized cardiac pacer leads. Atherosclerosis of the visualized thoracic aorta. Liver: Few scattered subcentimeter hypodensities in the liver are similar to the prior study and likely represent benign cysts. No new suspicious liver mass. Smooth liver contour. Gallbladder and Bile Ducts: Prior cholecystectomy. Spleen: Normal. Adrenal Glands: Normal. Pancreas: Normal. Genitourinary: Normal. Bowel: The stomach is incompletely distended and not well evaluated. Postsurgical changes of the bowel. Normal caliber bowel. Fluid throughout the colon. Vasculature: Atherosclerosis. No abdominal aortic aneurysm. Patent main portal vein. Bones and Soft Tissues: Osteopenia. Degenerative changes in the visualized spine and pelvis. L3 superior endplate 40-75% height loss was not present on the prior study but appears to be chronic. Retroperitoneum/Mesentery: No intraperitoneal free air, ascites or fluid collection. No lymphadenopathy in the abdomen or pelvis. Fluid throughout the colon suggests enteritis. No bowel obstruction. Postsurgical changes of the bowel. Partially visualized left lower lobe heterogeneous consolidation with trace left basilar pleural fluid and pleural thickening.   There is also a cavitary 4.3 cm mass in the right lower lobe which demonstrates a small air-fluid level. Findings may relate to infection. Underlying neoplastic process is not excluded. Short-term follow-up chest CT may be helpful for further evaluation. Xr Chest Portable    Result Date: 12/3/2020  EXAMINATION: ONE XRAY VIEW OF THE CHEST 12/3/2020 5:17 am COMPARISON: Earlier same day. HISTORY: ORDERING SYSTEM PROVIDED HISTORY: post RIJ central line TECHNOLOGIST PROVIDED HISTORY: post RIJ central line Reason for Exam: Right central line placement. Acuity: Acute Type of Exam: Initial Additional signs and symptoms: Right central line placement. FINDINGS: Interval placement of a right IJ central venous catheter with tip in the mid SVC. No pneumothorax. Redemonstration of low left lung volume and hyperexpanded right lung volume. Similar appearing left greater than right basilar airspace disease with left basilar consolidation and right lateral basilar masslike opacity. Small left pleural effusion. Stable left pectoral trans venous dual-chamber cardiac pacer device. Stable cardiomediastinal silhouette and great vessels. Interval placement of a right IJ central venous catheter with tip in the mid SVC. No pneumothorax. Otherwise, no significant interval change with redemonstration of bilateral basilar airspace disease with left basilar consolidation and right lateral basilar masslike opacity. Continued attention on follow-up recommended. Small left pleural effusion. Xr Chest Portable    Result Date: 12/3/2020  EXAMINATION: ONE XRAY VIEW OF THE CHEST 12/3/2020 2:20 am COMPARISON: May 29, 2019. HISTORY: ORDERING SYSTEM PROVIDED HISTORY: weakness TECHNOLOGIST PROVIDED HISTORY: weakness Reason for Exam: Pt c/o weakness, low blood pressure. Acuity: Acute Type of Exam: Initial Additional signs and symptoms: Pt c/o weakness, low blood pressure. FINDINGS: Frontal portable view of the chest.  Low left lung volume. Hyperexpanded right lung volume.   Leftward shift of the mediastinal A41.9] 12/04/2020    Hypotension [I95.9] 12/03/2020    Hospital-acquired pneumonia [J18.9, Y95] 12/03/2020    Chronic obstructive pulmonary disease, unspecified ([facility] [J44.9] 05/24/2019    Elevated C-reactive protein (CRP) [R79.82]     Urinary retention [R33.9]     MRSA colonization [Z22.322]     CVA (cerebral vascular accident) ([facility] [I63.9]        Plan:        Septic shock secondary to colonized indwelling line  -T 98.2, HR 90, RR 18, /46  -WBC 13.0 from 11.7 yesterday  -Levophed discontinued  -Blood cultures x2+ for staph capitis and staph hominis sensitive to gentamicin, oxacillin, Bactrim  -Likely switch patient from levofloxacin to Bactrim, will follow infectious disease recommendations  -Catheter to culture growing coagulase-negative staph  -Repeat blood cultures x2 negative for 24 hours  -Infectious disease consulted, following recommendations    Cavitary right lower lobe lobe mass; infectious versus malignant etiology  -CT chest without contrast 10/6/2020 displaying 4.6 x 4.4 x 3.5 cm cavitary right lower lobe mass  -Follow-up CT chest in 4 to 6 weeks with possible lung biopsy at that time  -Pulmonology consulted following recommendations  -Appreciate ID recommendations concerning antibiotics    Sedation secondary to polypharmacy   -Patient difficult to arouse this a.m.  -Noted that nightly medications include trazodone, mirtazapine, metoprolol, baclofen, Norco, and Depakote  -Trazodone decreased from 100 mg per night to 50 mg per night  -We will reassess patient for improvement in somnolence    Chronic diastolic CHF with reduced ejection fraction  -Pro BNP shows 980, lower than past readings  -Echo (4/2019) shows LVEF 45%.  Evidence of diastolic dysfunction  -Echo (12/2020) shows LVEF>55%.  Pacemaker present in the right heart chambers.  Small LV cavity.  -Lasix 20mg daily, HELD     COPD  -Continue home medication Symbicort BID daily and DuoNeb PRN     Hypokalemia (Resolved)  -Potassium 2.9 (12/4)  -Potassium replaced, rechecked later in evening at 3.9    Priscila Sanchez MD  12/7/2020  8:55 AM     Attending Physician Statement  I have discussed the care of Jason Chery with the resident team. I have examined the patient myself and taken ros and hpi , including pertinent history and exam findings,  with the resident. I have reviewed the key elements of all parts of the encounter with the resident. I agree with the assessment, plan and orders as documented by the resident. Principal Problem:    Hospital-acquired pneumonia  Active Problems:    CVA (cerebral vascular accident) ([address]    MRSA colonization    Urinary retention    Elevated C-reactive protein (CRP)    Chronic obstructive pulmonary disease, unspecified (HCC)    Hypotension    Line sepsis (HCC)    Pacemaker    Bacteremia due to Staphylococcus  Resolved Problems:    * No resolved hospital problems.  *    Septic shock from HCAP, staphylococcal capitis line Sepsis, blood cultures positive, on vancomycin, Levaquin per ID, tip cx pending  Shock resolved off Levophed  New right lower lobe mass- pulm [id number] 4 to 6 weeks with repeat CT  Chronic urinary retention    Blood cultures -staph capitis sensitive to oxacillin gentamicin Bactrim, patient is allergic to penicillins  Also right mycoplasma IgM positive-will complete course of Levaquin  Appreciate ID recommendations  Repeat blood cultures no growth 2 days    Patient reports feeling worse today-complaining of difficulty breathing during rounds  Have episode of hypoxia earlier today  Significant crackles on exam needing for later nasal cannula oxygen, will get chest x-ray, ABG, repeat blood pressure, IV Lasix 20 mg once  Patient had episode of increased drowsiness this morning-dose of sedated medication given at nighttime reduced  Uptrend in white cell count-WBC 13, no fever spikes, will monitor    Electronically signed by Lisseth Mathur MD

## 2020-12-07 NOTE — PLAN OF CARE
Problem: Falls - Risk of:  Goal: Will remain free from falls  Description: Will remain free from falls  Outcome: Ongoing  Goal: Absence of physical injury  Description: Absence of physical injury  Outcome: Ongoing     Problem: Pain:  Goal: Pain level will decrease  Description: Pain level will decrease  Outcome: Ongoing  Goal: Control of acute pain  Description: Control of acute pain  Outcome: Ongoing  Goal: Control of chronic pain  Description: Control of chronic pain  Outcome: Ongoing     Problem: Skin Integrity:  Goal: Will show no infection signs and symptoms  Description: Will show no infection signs and symptoms  Outcome: Ongoing  Goal: Absence of new skin breakdown  Description: Absence of new skin breakdown  Outcome: Ongoing  Goal: Demonstration of wound healing without infection will improve  Description: Demonstration of wound healing without infection will improve  Outcome: Ongoing  Goal: Complications related to intravenous access or infusion will be avoided or minimized  Description: Complications related to intravenous access or infusion will be avoided or minimized  Outcome: Ongoing     Problem: Cardiac:  Goal: Ability to maintain vital signs within normal range will improve  Description: Ability to maintain vital signs within normal range will improve  Outcome: Ongoing  Goal: Cardiovascular alteration will improve  Description: Cardiovascular alteration will improve  Outcome: Ongoing     Problem: Health Behavior:  Goal: Will modify at least one risk factor affecting health status  Description: Will modify at least one risk factor affecting health status  Outcome: Ongoing  Goal: Identification of resources available to assist in meeting health care needs will improve  Description: Identification of resources available to assist in meeting health care needs will improve  Outcome: Ongoing     Problem: Physical Regulation:  Goal: Ability to maintain vital signs within normal range will improve  Description: Ability to maintain vital signs within normal range will improve  Outcome: Ongoing  Goal: Complications related to the disease process, condition or treatment will be avoided or minimized  Description: Complications related to the disease process, condition or treatment will be avoided or minimized  Outcome: Ongoing  Goal: Diagnostic test results will improve  Description: Diagnostic test results will improve  Outcome: Ongoing  Goal: Will remain free from infection  Description: Will remain free from infection  Outcome: Ongoing     Problem: Nutritional:  Goal: Nutritional status will improve  Description: Nutritional status will improve  Outcome: Ongoing     Problem: Respiratory:  Goal: Ability to maintain normal respiratory secretions will improve  Description: Ability to maintain normal respiratory secretions will improve  Outcome: Ongoing     Problem: HEMODYNAMIC STATUS  Goal: Patient has stable vital signs and fluid balance  Outcome: Ongoing     Problem: ACTIVITY INTOLERANCE/IMPAIRED MOBILITY  Goal: Mobility/activity is maintained at optimum level for patient  Outcome: Ongoing     Problem: COMMUNICATION IMPAIRMENT  Goal: Ability to express needs and understand communication  Outcome: Ongoing     Problem: Nutrition  Goal: Optimal nutrition therapy  Description: Nutrition Problem #1: Inadequate oral intake  Intervention: Food and/or Nutrient Delivery: Continue Current Diet, Start Oral Nutrition Supplement  Nutritional Goals: po intake greater than 50%     Outcome: Ongoing

## 2020-12-07 NOTE — PROCEDURES
INSTRUMENTAL SWALLOW REPORT  MODIFIED BARIUM SWALLOW    NAME: Pa Lang   : 1950  MRN: [id number]       Date of Eval: 2020              Referring Diagnosis(es):  dysphagia    Past Medical History:  has a past medical history of Abnormal computed tomography of cervical spine, CVA (cerebral vascular accident) ([facility] [address] GERD (gastroesophageal reflux disease), Hypertension, Paraproteinemia, and Weight loss. Past Surgical History:  has a past surgical history that includes pacemaker placement (2011); intubation (2019); Upper gastrointestinal endoscopy (N/A, 2019); laparoscopy (N/A, 5/15/2019); Cholecystectomy (N/A, 5/15/2019); Abdomen surgery (N/A, 2019); Gastrostomy tube placement (N/A, 2019); and Peg Tube Removal (N/A, 2019). Current Diet Solid Consistency: Regular  Current Diet Liquid Consistency: Thin       Type of Study: Initial MBS       Recent CXR/CT of Chest: Date - CXR-   Increased bibasilar opacities could represent atelectasis or pneumonia         Increased left pleural effusion          Patient Complaints/Reason for Referral:  Pa Lang was referred for a MBS to assess the efficiency of his/her swallow function, assess for aspiration, and to make recommendations regarding safe dietary consistencies, effective compensatory strategies, and safe eating environment. Onset of problem:    Per pt., she is not having any difficulties with swallowing at this time. Admit for hypotension and HCAP          Behavior/Cognition/Vision/Hearing:  Behavior/Cognition: Alert; Cooperative  Vision: Within Functional Limits  Hearing: Within functional limits    Impressions:     Patient Position: Lateral     Consistencies Administered: Dysphagia Soft and Bite-Sized (Dysphagia III); Reg solid; Thin straw; Thin cup;Nectar cup;Pudding teaspoon              Oral Phase: Premature vallecular spillage of all consistencies, decreased mastication of dry solids d/t sparse dentition. Pharyngeal: Thin liquids by cup and straw:  deep penetration. Nectar thick, honey thick, pudding, soft and dry solids:  functional with no penetration/aspiration. Dysphagia Outcome Severity Scale: Level 4: Mild moderate dysphagia- Intermittent supervision/cueing. One - two diet consistencies restricted  Penetration-Aspiration Scale (PAS): 3 - Material enters the airway, remains above the vocal folds, and is not ejected from airway    Recommended Diet:  Solid consistency: Dental Soft  Liquid consistency: Mildly Thick ([place])            Safe Swallow Protocol:     Compensatory Swallowing Strategies: Alternate solids and liquids;Small bites/sips;Upright as possible for all oral intake;Remain upright for 30-45 minutes after meals              Recommendations/Treatment  Requires SLP Intervention: Yes                  Recommended Exercises:    Therapeutic Interventions: Tongue base strengthening         Education:  recommendations were reviewed with pt.  following this exam.      Patient Education Response: Verbalizes understanding;Needs reinforcement           Goals:    Long Term:    Diet at least restrictive consistency. Short Term:     Goal 1: Increase tongue base strength to improve swallow function. Oral Preparation / Oral Phase  Oral Phase: Impaired        Pharyngeal Phase  Pharyngeal Phase: Impaired      Esophageal Phase  Esophageal Screen: WNL        Pain   Patient Currently in Pain: Yes  Pain Level: 9  Pain Type: Chronic pain  Pain Location: Leg      Therapy Time:   Individual Concurrent Group Co-treatment   Time In 1500         Time Out 1515         Minutes [facility]. A.CCC/SLP     12/7/2020, 3:27 PM

## 2020-12-07 NOTE — PROGRESS NOTES
x-ray showed the left greater than right basilar opacities  CT abdomen pelvis suggestive of enteritis,  lower lobe consolidation, cavitary 4.3 cm mass in the right lower lobe with a small air-fluid level. Interval changes  12/7/2020   She is comfortable, denied increased shortness of breath, mild cough, denied vomiting, no new events    Patient Vitals for the past 8 hrs:   BP Temp Temp src Pulse Resp SpO2   12/07/20 1118 -- -- -- -- -- 92 %   12/07/20 1100 (!) 125/59 -- -- 102 -- 96 %   12/07/20 0830 (!) 101/46 98.2 °F (36.8 °C) Oral 90 18 95 %   12/07/20 0829 -- -- -- -- -- (!) 79 %           I have personally reviewed the past medical history, past surgical history, medications, social history, and family history, and I haveupdated the database accordingly. Allergies:   Penicillins and Amoxicillin     Review of Systems:     Review of Systems  Decreased appetite, mild cough and shortness of breath, other than above 12 systems reviewed were negative  Physical Examination :       Physical Exam  HENT:      Head: Normocephalic and atraumatic. Right Ear: External ear normal.      Left Ear: External ear normal.      Mouth/Throat:      Pharynx: Oropharynx is clear. No posterior oropharyngeal erythema. Eyes:      General: No scleral icterus. Conjunctiva/sclera: Conjunctivae normal.   Neck:      Musculoskeletal: Neck supple. No neck rigidity. Cardiovascular:      Rate and Rhythm: Normal rate and regular rhythm. Heart sounds: No murmur. Pulmonary:      Effort: Pulmonary effort is normal.      Breath sounds: Normal breath sounds. No wheezing. Abdominal:      Palpations: Abdomen is soft. Tenderness: There is no abdominal tenderness. Genitourinary:     Comments: External urinary catheter in place  Musculoskeletal:      Right lower leg: No edema. Left lower leg: No edema. Skin:     General: Skin is warm. Coloration: Skin is not jaundiced.    Neurological:      Mental Status: She is alert. Comments: Oriented to person and place         Past Medical History:     Past Medical History:   Diagnosis Date    Abnormal computed tomography of cervical spine     sclerotic bone appearance     CVA (cerebral vascular accident) ([address]     left  side weakness    GERD (gastroesophageal reflux disease)     Hypertension     Paraproteinemia     Weight loss        Past Surgical  History:     Past Surgical History:   Procedure Laterality Date    ABDOMEN SURGERY N/A 5/25/2019    ABDOMEN DEBRIDEMENT WOUND CLOSURE REOPENING OF RECENT LAPOROATOMY, ABDOMINAL WASHOUT, REPAIR FASCIAL DEHISENCE WITH MESH performed by Abida Rosado DO at [facility] 5/15/2019    CHOLECYSTECTOMY performed by Abida Rosado DO at [facility] 5/25/2019    GASTROSTOMY TUBE PLACEMENT performed by Abida Rosado DO at [facility] N/A 7/27/2019    EGD ESOPHAGOGASTRODUODENOSCOPY WITH REMOVAL OF PEG TUBE performed by Abida Rosado DO at [address]  4/14/2019         LAPAROSCOPY N/A 5/15/2019    LAPAROSCOPY EXPLORATORY CONVERTED TO EXPLORATORY LAPAROTOMY/ RIGHT COLON RESECTION AND ANASTAMOSIS/ OPEN CHOLECYSTECTOMY/ EXTENSIVE LYSIS OF ADHESIONS performed by Abida Rosado DO at [place] [address]  07/2011    Pacemaker is Medtronic Revo (compatible). Leads placed in 1995 are NOT MRI compatible. Placed at [facility]. V's per Dr. Narinder Mendez can not have an MRI.     UPPER GASTROINTESTINAL ENDOSCOPY N/A 4/16/2019    EGD ESOPHAGOGASTRODUODENOSCOPY @ BEDSIDE  ICU 2002 performed by Ruy Gonzalez MD at [facility] OR       Medications:      traZODone  50 mg Oral Nightly    vancomycin  1,000 mg Intravenous Q18H    metoprolol tartrate  50 mg Oral BID    levofloxacin  750 mg Intravenous Q24H    vancomycin (VANCOCIN) intermittent dosing (placeholder)   Other RX Placeholder    sodium chloride  500 mL Intravenous Once    budesonide-formoterol  2 puff Inhalation BID    divalproex  125 mg Oral BID    sertraline  50 mg Oral Daily    mirtazapine  7.5 mg Oral Nightly    levothyroxine  125 mcg Oral Daily    baclofen  10 mg Oral BID    sodium chloride flush  10 mL Intravenous 2 times per day    enoxaparin  40 mg Subcutaneous Daily       Social History:     Social History     Socioeconomic History    Marital status:      Spouse name: Not on file    Number of children: Not on file    Years of education: Not on file    Highest education level: Not on file   Occupational History    Not on file   Social Needs    Financial resource strain: Not on file    Food insecurity     Worry: Not on file     Inability: Not on file    Transportation needs     Medical: Not on file     Non-medical: Not on file   Tobacco Use    Smoking status: Former Smoker     Packs/day: 1.00     Years: 30.00     Pack years: 30.00    Smokeless tobacco: Never Used   Substance and Sexual Activity    Alcohol use: Not Currently     Alcohol/week: 28.0 standard drinks     Types: 28 Glasses of wine per week    Drug use: No    Sexual activity: Not on file   Lifestyle    Physical activity     Days per week: Not on file     Minutes per session: Not on file    Stress: Not on file   Relationships    Social connections     Talks on phone: Not on file     Gets together: Not on file     Attends Alevism service: Not on file     Active member of club or organization: Not on file     Attends meetings of clubs or organizations: Not on file     Relationship status: Not on file    Intimate partner violence     Fear of current or ex partner: Not on file     Emotionally abused: Not on file     Physically abused: Not on file     Forced sexual activity: Not on file   Other Topics Concern    Not on file   Social History Narrative    Not on file       Family History:   History reviewed. No pertinent family history.    Medical Decision Making:   I have independently reviewed/ordered the following labs:    CBC with Differential:   Recent Labs     12/05/20  0854 12/06/20  0551 12/07/20  0454   WBC 11.2* 11.7* 13.0*   HGB 9.5* 8.0* 8.3*   HCT 28.4* 23.9* 25.4*    275 288   LYMPHOPCT 8*  --   --    MONOPCT 3  --   --      BMP:  Recent Labs     12/05/20  0854 12/06/20  0551 12/07/20  0454   * 137 136   K 3.1* 3.7 4.2    107 108*   CO2 19* 22 23   BUN 4* 4* 5*   CREATININE <0.40* <0.40* 0.48*   MG 1.1* 2.0  --      Hepatic Function Panel:   No results for input(s): PROT, LABALBU, BILIDIR, IBILI, BILITOT, ALKPHOS, ALT, AST in the last 72 hours. No results for input(s): RPR in the last 72 hours. No results for input(s): HIV in the last 72 hours. No results for input(s): BC in the last 72 hours. Lab Results   Component Value Date    CREATININE 0.48 12/07/2020    GLUCOSE 78 12/07/2020    GLUCOSE 87 05/21/2012       Detailed results:    Ct Chest Wo Contrast     Result Date: 12/6/2020  EXAMINATION: CT OF THE CHEST WITHOUT CONTRAST 12/4/2020 3:08 pm TECHNIQUE: CT of the chest was performed without the administration of intravenous contrast. Multiplanar reformatted images are provided for review. Dose modulation, iterative reconstruction, and/or weight based adjustment of the mA/kV was utilized to reduce the radiation dose to as low as reasonably achievable. COMPARISON: Chest radiograph 12/03/2020, CT abdomen and pelvis 12/03/2020. HISTORY: ORDERING SYSTEM PROVIDED HISTORY: pneumonia TECHNOLOGIST PROVIDED HISTORY: pneumonia Reason for Exam: f/u pneumonia Acuity: Unknown Type of Exam: Unknown FINDINGS: Mediastinum: Suspected 17 mm lymph node posterior to the left medial clavicle appears to have increased in size from a 2012 chest CT. No other definite lymphadenopathy. No pericardial effusion. Coronary artery calcifications are seen. The thoracic aorta is nonaneurysmal.  The main pulmonary artery is not significantly dilated. Right IJ CVC catheter tip is in the SVC.   There is a left chest wall cardiac device with multiple leads. Lungs/pleura: The central airways are patent. Small bilateral pleural effusions. No pneumothorax is seen. Emphysematous changes are seen in the lungs. Similar appearance to the CT abdomen and pelvis from 12/03/2020, there is a cavitary mass in the right lower lobe abutting the pleura, measuring 4.6 x 4.4 x 3.5 cm. There is dependent atelectasis in the right lung. There is a rounded opacity in the left lower lobe broadly abutting the pleura. Additional airspace opacities seen in the left lower lobe. There are also areas of atelectasis in the left lingula and left lower lobe. 3 mm right upper lobe pulmonary nodule (series 601, image 60). Upper Abdomen: Limited images of the upper abdomen are unremarkable. 4 mm hypodensity in the liver is too small to characterize but also seen on the comparison abdomen CT (series 2, image 111). Soft Tissues/Bones: No acute bony abnormality. There are old left-sided rib fractures.      Approximately 4.6 x 4.4 x 3.5 cm cavitary right lower lobe mass, similar to the recent abdomen and pelvis CT from 12/03/2020. This is new from a CT dated 07/25/2019. This could represent a cavitary pneumonia however a primary lung neoplasm is not excluded. Recommend short-term follow-up CT after treatment to demonstrate interval change. Rounded opacity in the left lower lobe broadly abutting the pleura could represent rounded atelectasis or infection. Additional airspace opacities in the left lower lobe are suspicious for infection. This area can also be followed up with CT. Small bilateral pleural effusions. 3 mm right upper lobe pulmonary nodule. Follow-up recommendations are below. Suspected enlarged lymph node in the left upper mediastinum posterior to the medial left clavicle, nonspecific.  RECOMMENDATIONS: Fleischner Society guidelines for follow-up and management of incidentally detected pulmonary nodules: Nodule size less than 6 mm In a low-risk patient, no routine follow-up. In a high-risk patient, optional CT at 12 months. - Low risk patients include individuals with minimal or absent history of smoking and other known risk factors. - High risk patients include individuals with a history or smoking or known risk factors. Radiology 2017 http://pubs. rsna.org/doi/full/10.1148/radiol. 0046299543      Ct Abdomen Pelvis W Iv Contrast Additional Contrast? None       Thank you for allowing us to participate in the care of this patient. Please call with questions. This note is created with the assistance of a speech recognition program.  While intending to generate adocument that actually reflects the content of the visit, the document can still have some errors including those of syntax and sound a like substitutions which may escape proof reading. It such instances, actual meaningcan be extrapolated by contextual diversion.     [place] MD  Office: [phone]  Perfect serve / office [phone]

## 2020-12-07 NOTE — PROGRESS NOTES
RN rounded with Dr. Zandra Real. Patient status reviewed. Okay to d/c per pulm when okay with other services.

## 2020-12-07 NOTE — PROGRESS NOTES
PULMONARY PROGRESS NOTE:    REASON FOR VISIT: pneumonia  Interval History:    Shortness of Breath: no  Cough: no  Sputum: no          Hemoptysis: no  Chest Pain: no  Fever: no                   Swelling Feet: no  Headache: no                                           Nausea, Emesis, Abdominal Pain: no  Diarrhea: no         Constipation: no    Events since last visit: none    PAST MEDICAL HISTORY:      Scheduled Meds:   traZODone  50 mg Oral Nightly    vancomycin  1,000 mg Intravenous Q18H    metoprolol tartrate  50 mg Oral BID    levofloxacin  750 mg Intravenous Q24H    vancomycin (VANCOCIN) intermittent dosing (placeholder)   Other RX Placeholder    sodium chloride  500 mL Intravenous Once    budesonide-formoterol  2 puff Inhalation BID    divalproex  125 mg Oral BID    sertraline  50 mg Oral Daily    mirtazapine  7.5 mg Oral Nightly    levothyroxine  125 mcg Oral Daily    baclofen  10 mg Oral BID    sodium chloride flush  10 mL Intravenous 2 times per day    enoxaparin  40 mg Subcutaneous Daily     Continuous Infusions:    PRN Meds:perflutren lipid microspheres, sodium chloride flush, ipratropium-albuterol, HYDROcodone-acetaminophen, sodium chloride flush, potassium chloride **OR** potassium alternative oral replacement **OR** potassium chloride, magnesium sulfate, acetaminophen **OR** acetaminophen, polyethylene glycol, promethazine **OR** ondansetron        PHYSICAL EXAMINATION:  BP (!) 125/59   Pulse 102   Temp 98.2 °F (36.8 °C) (Oral)   Resp 18   Ht 5' (1.524 m)   Wt 145 lb 8 oz (66 kg)   SpO2 92%   BMI 28.42 kg/m²     General : Awake, alert,   Neck - supple, no lymphadenopathy, JVD not raised  Heart - regular rhythm, S1 and S2 normal; no additional sounds heard  Lungs - Air Entry- fair bilaterally; breath sounds : vesicular;   rales/crackles - absent  Abdomen - soft, no tenderness  Upper Extremities  - no cyanosis, mottling; edema : absent  Lower Extremities: no cyanosis, mottling; edema : absent    Current Laboratory, Radiologic, Microbiologic, and Diagnostic studies reviewed  Data ReviewCBC:   Recent Labs     12/05/20  0854 12/06/20  0551 12/07/20  0454   WBC 11.2* 11.7* 13.0*   RBC 3.13* 2.67* 2.78*   HGB 9.5* 8.0* 8.3*   HCT 28.4* 23.9* 25.4*    275 288     BMP:   Recent Labs     12/05/20  0854 12/06/20  0551 12/07/20  0454   GLUCOSE 95 91 78   * 137 136   K 3.1* 3.7 4.2   BUN 4* 4* 5*   CREATININE <0.40* <0.40* 0.48*   CALCIUM 7.8* 7.7* 8.0*     ABGs:   Recent Labs     12/07/20  1118   PHART 7.352   PO2ART 26.9*   AEN4DAY 37.4   MDB7TMG 20.7*   [id number] 53.8*      PT/INR:  No results found for: PTINR    ASSESSMENT / PLAN:    Pneumonia - ABx  OK for DC with ABx  F/u CT chest 4-6 weeks; may need needle biopsy    Electronically signed by Venkata Barraza on 12/07/20 at 11:41 AM.

## 2020-12-08 NOTE — PROGRESS NOTES
Pt had PICC placed to RUE. Tolerated well. Resident team notified of urination and the fact that it was not via the external cath but with brief. Also notified that the sbp was 106 after recheck by RN. No new orders at this time.

## 2020-12-08 NOTE — PROGRESS NOTES
Dr. Soumya Brown notified by radiologist of results of chest xray this am. Just notified Dr. Ciara Hein of this via perfect serve. Awaiting response from Dr. Ciara Hein.

## 2020-12-08 NOTE — PLAN OF CARE
Nutrition Problem #1: Inadequate oral intake  Intervention: Food and/or Nutrient Delivery: Continue Current Diet, Continue Oral Nutrition Supplement  Nutritional Goals: po intake greater than 50%

## 2020-12-08 NOTE — PROGRESS NOTES
[facility] -   Infectious diseases evaluation  admission date 12/3/2020    reason for consultation:   Healthcare acquired pneumonia  Impression :   Current:  · Healthcare acquired pneumonia with cavitary right lower lobe mass/positive mycoplasma IgM. ·  STAPHYLOCOCCUS HOMINIS oxacillin sensitive bacteremia versus contamination could be related to peripheral lines that was removed. · Pacemaker in place  · Positive MRSA swab  · Possible pneumothorax  · Hypertension  · Urinary retention  · Chronic CHF  · History of ESBL and MRSA infection    Other:  · Penicillin allergy    Recommendations   · Echocardiogram showed no obvious vegetation  · IV vancomycin discontinued 12/7/2020  · IV Ancef and IV Levaquin for now  · PICC line   ·  IV Ancef for MSSE bacteremia in the presence of pacemaker for 5 weeks and 3 weeks of oral doxycycline   · CT chest planned in 4 to 6 weeks by pulmonary with consideration for lung biopsy if no improvement  · Follow blood cultures from 12/5/2020 no growth to date  · Follow CBC renal function  · Continue supportive care  · Discussed with nursing staff          History of Present Illness:   Initial history:  Elaine Hamilton is a 65y.o.-year-old female with history of CVA was brought to the ER from her nursing home for low blood pressure  for 1 day associated with generalized weakness. The patient had poor IV access and was not able to get IV fluids at the nursing home. The patient was placed on Levophed initially that was weaned off  12/2/2020 WBC 12.6, creatinine normal,  980, lactic acid 1.3, COVID-19 rapid test was negative.   Blood cultures 12/3/2012 2 grew gram-positive cocci in cluster, procalcitonin 0.08, mycoplasma IgM 1.22, C-reactive protein 156, nasal swab for MRSA was positive, respiratory molecular panel with Covid PCR was negative  Chest x-ray showed the left greater than right basilar opacities  CT abdomen pelvis suggestive of enteritis,  lower lobe consolidation, cavitary 4.3 cm mass in the right lower lobe with a small air-fluid level. Interval changes  12/8/2020   She is on 2 L of oxygen per nasal cannula, had episode of shortness of breath earlier, chest x-ray showed potential right-sided pneumothorax, bilateral pleural effusions and bibasilar consolidation, denied significant cough, denied vomiting or diarrhea, no other new complaints. proBNP 19 ,342 today  WBC 13.6  Failed swallow study, diet was changed    Patient Vitals for the past 8 hrs:   BP Temp Temp src Pulse Resp SpO2   12/08/20 1419 (!) 92/55 98.1 °F (36.7 °C) Oral 60 18 95 %   12/08/20 0849 (!) 105/92 98.6 °F (37 °C) Oral 67 16 94 %           I have personally reviewed the past medical history, past surgical history, medications, social history, and family history, and I haveupdated the database accordingly. Allergies:   Penicillins and Amoxicillin     Review of Systems:     Review of Systems  Decreased appetite, mild cough and shortness of breath, other than above 12 systems reviewed were negative  Physical Examination :       Physical Exam  HENT:      Head: Normocephalic and atraumatic. Right Ear: External ear normal.      Left Ear: External ear normal.      Mouth/Throat:      Pharynx: Oropharynx is clear. No posterior oropharyngeal erythema. Eyes:      General: No scleral icterus. Conjunctiva/sclera: Conjunctivae normal.   Neck:      Musculoskeletal: Neck supple. No neck rigidity. Cardiovascular:      Rate and Rhythm: Normal rate and regular rhythm. Heart sounds: No murmur. Pulmonary:      Effort: Pulmonary effort is normal.      Breath sounds: Normal breath sounds. No wheezing. Abdominal:      Palpations: Abdomen is soft. Tenderness: There is no abdominal tenderness. Genitourinary:     Comments: External urinary catheter in place  Musculoskeletal:      Right lower leg: No edema. Left lower leg: No edema.    Skin:     General: Skin is warm. Coloration: Skin is not jaundiced. Neurological:      Mental Status: She is alert. Comments: Oriented to person and place         Past Medical History:     Past Medical History:   Diagnosis Date    Abnormal computed tomography of cervical spine     sclerotic bone appearance     CVA (cerebral vascular accident) ([address]     left  side weakness    GERD (gastroesophageal reflux disease)     Hypertension     Paraproteinemia     Weight loss        Past Surgical  History:     Past Surgical History:   Procedure Laterality Date    ABDOMEN SURGERY N/A 5/25/2019    ABDOMEN DEBRIDEMENT WOUND CLOSURE REOPENING OF RECENT LAPOROATOMY, ABDOMINAL WASHOUT, REPAIR FASCIAL DEHISENCE WITH MESH performed by Andrade Hong DO at [facility] 5/15/2019    CHOLECYSTECTOMY performed by Andrade Hong DO at [facility] 5/25/2019    GASTROSTOMY TUBE PLACEMENT performed by Andrade Hong DO at [address] [facility] N/A 7/27/2019    EGD ESOPHAGOGASTRODUODENOSCOPY WITH REMOVAL OF PEG TUBE performed by Andrade Hong DO at [address] [place] [address]  4/14/2019         LAPAROSCOPY N/A 5/15/2019    LAPAROSCOPY EXPLORATORY CONVERTED TO EXPLORATORY LAPAROTOMY/ RIGHT COLON RESECTION AND ANASTAMOSIS/ OPEN CHOLECYSTECTOMY/ EXTENSIVE LYSIS OF ADHESIONS performed by Andrade Hong DO at [place] [address]  07/2011    Pacemaker is Medtronic Revo (compatible). Leads placed in 1995 are NOT MRI compatible. Placed at [facility]. V's per Dr. Jamarcus Torres can not have an MRI.     UPPER GASTROINTESTINAL ENDOSCOPY N/A 4/16/2019    EGD ESOPHAGOGASTRODUODENOSCOPY @ [address]  ICU 2002 performed by Estrada Gallo MD at [address]       Medications:      traZODone  50 mg Oral Nightly    ceFAZolin  2 g Intravenous Q8H    [Held by provider] metoprolol tartrate  50 mg Oral BID    levofloxacin  750 mg Intravenous Q24H    sodium chloride  500 mL Intravenous Once    budesonide-formoterol  2 puff Inhalation BID    divalproex  125 mg Oral BID    sertraline  50 mg Oral Daily    mirtazapine  7.5 mg Oral Nightly    levothyroxine  125 mcg Oral Daily    baclofen  10 mg Oral BID    sodium chloride flush  10 mL Intravenous 2 times per day    enoxaparin  40 mg Subcutaneous Daily       Social History:     Social History     Socioeconomic History    Marital status:      Spouse name: Not on file    Number of children: Not on file    Years of education: Not on file    Highest education level: Not on file   Occupational History    Not on file   Social Needs    Financial resource strain: Not on file    Food insecurity     Worry: Not on file     Inability: Not on file    Transportation needs     Medical: Not on file     Non-medical: Not on file   Tobacco Use    Smoking status: Former Smoker     Packs/day: 1.00     Years: 30.00     Pack years: 30.00    Smokeless tobacco: Never Used   Substance and Sexual Activity    Alcohol use: Not Currently     Alcohol/week: 28.0 standard drinks     Types: 28 Glasses of wine per week    Drug use: No    Sexual activity: Not on file   Lifestyle    Physical activity     Days per week: Not on file     Minutes per session: Not on file    Stress: Not on file   Relationships    Social connections     Talks on phone: Not on file     Gets together: Not on file     Attends Mandaen service: Not on file     Active member of club or organization: Not on file     Attends meetings of clubs or organizations: Not on file     Relationship status: Not on file    Intimate partner violence     Fear of current or ex partner: Not on file     Emotionally abused: Not on file     Physically abused: Not on file     Forced sexual activity: Not on file   Other Topics Concern    Not on file   Social History Narrative    Not on file       Family History:   History reviewed. No pertinent family history.    Medical Decision Making:   I have independently reviewed/ordered the following labs:    CBC with Differential:   Recent Labs     12/07/20  0454 12/08/20  0632   WBC 13.0* 13.6*   HGB 8.3* 7.9*   HCT 25.4* 24.2*    299     BMP:  Recent Labs     12/06/20  0551 12/07/20  0454 12/08/20  0632    136 136   K 3.7 4.2 4.0    108* 106   CO2 22 23 22   BUN 4* 5* 6*   CREATININE <0.40* 0.48* 0.73   MG 2.0  --   --      Hepatic Function Panel:   No results for input(s): PROT, LABALBU, BILIDIR, IBILI, BILITOT, ALKPHOS, ALT, AST in the last 72 hours. No results for input(s): RPR in the last 72 hours. No results for input(s): HIV in the last 72 hours. No results for input(s): BC in the last 72 hours. Lab Results   Component Value Date    CREATININE 0.73 12/08/2020    GLUCOSE 85 12/08/2020    GLUCOSE 87 05/21/2012       Detailed results:    Ct Chest Wo Contrast     Result Date: 12/6/2020  EXAMINATION: CT OF THE CHEST WITHOUT CONTRAST 12/4/2020 3:08 pm TECHNIQUE: CT of the chest was performed without the administration of intravenous contrast. Multiplanar reformatted images are provided for review. Dose modulation, iterative reconstruction, and/or weight based adjustment of the mA/kV was utilized to reduce the radiation dose to as low as reasonably achievable. COMPARISON: Chest radiograph 12/03/2020, CT abdomen and pelvis 12/03/2020. HISTORY: ORDERING SYSTEM PROVIDED HISTORY: pneumonia TECHNOLOGIST PROVIDED HISTORY: pneumonia Reason for Exam: f/u pneumonia Acuity: Unknown Type of Exam: Unknown FINDINGS: Mediastinum: Suspected 17 mm lymph node posterior to the left medial clavicle appears to have increased in size from a 2012 chest CT. No other definite lymphadenopathy. No pericardial effusion. Coronary artery calcifications are seen. The thoracic aorta is nonaneurysmal.  The main pulmonary artery is not significantly dilated. Right IJ CVC catheter tip is in the SVC. There is a left chest wall cardiac device with multiple leads. Lungs/pleura:  The central airways are patent. Small bilateral pleural effusions. No pneumothorax is seen. Emphysematous changes are seen in the lungs. Similar appearance to the CT abdomen and pelvis from 12/03/2020, there is a cavitary mass in the right lower lobe abutting the pleura, measuring 4.6 x 4.4 x 3.5 cm. There is dependent atelectasis in the right lung. There is a rounded opacity in the left lower lobe broadly abutting the pleura. Additional airspace opacities seen in the left lower lobe. There are also areas of atelectasis in the left lingula and left lower lobe. 3 mm right upper lobe pulmonary nodule (series 601, image 60). Upper Abdomen: Limited images of the upper abdomen are unremarkable. 4 mm hypodensity in the liver is too small to characterize but also seen on the comparison abdomen CT (series 2, image 111). Soft Tissues/Bones: No acute bony abnormality. There are old left-sided rib fractures.      Approximately 4.6 x 4.4 x 3.5 cm cavitary right lower lobe mass, similar to the recent abdomen and pelvis CT from 12/03/2020. This is new from a CT dated 07/25/2019. This could represent a cavitary pneumonia however a primary lung neoplasm is not excluded. Recommend short-term follow-up CT after treatment to demonstrate interval change. Rounded opacity in the left lower lobe broadly abutting the pleura could represent rounded atelectasis or infection. Additional airspace opacities in the left lower lobe are suspicious for infection. This area can also be followed up with CT. Small bilateral pleural effusions. 3 mm right upper lobe pulmonary nodule. Follow-up recommendations are below. Suspected enlarged lymph node in the left upper mediastinum posterior to the medial left clavicle, nonspecific. RECOMMENDATIONS: Fleischner Society guidelines for follow-up and management of incidentally detected pulmonary nodules: Nodule size less than 6 mm In a low-risk patient, no routine follow-up.  In a high-risk patient, optional CT at 12 months. - Low risk patients include individuals with minimal or absent history of smoking and other known risk factors. - High risk patients include individuals with a history or smoking or known risk factors. Radiology 2017 http://pubs. rsna.org/doi/full/10.1148/radiol. 0222974029      Ct Abdomen Pelvis W Iv Contrast Additional Contrast? None       Thank you for allowing us to participate in the care of this patient. Please call with questions. This note is created with the assistance of a speech recognition program.  While intending to generate adocument that actually reflects the content of the visit, the document can still have some errors including those of syntax and sound a like substitutions which may escape proof reading. It such instances, actual meaningcan be extrapolated by contextual diversion.     Rishabh Odom MD  Office: [phone]  Perfect serve / office [phone]

## 2020-12-08 NOTE — PLAN OF CARE
Problem: Falls - Risk of:  Goal: Will remain free from falls  Description: Will remain free from falls  12/8/2020 0453 by Eugenie Camarena RN  Outcome: Met This Shift  Note: Patient assessed as a high fall risk this shift. Bed remains locked and in lowest position with call light and bedside table in reach. Bed alarm is on. Hourly rounding performed. Problem: Falls - Risk of:  Goal: Absence of physical injury  Description: Absence of physical injury  12/8/2020 0453 by Eugenie Camarena RN  Outcome: Met This Shift     Problem: Pain:  Goal: Pain level will decrease  Description: Pain level will decrease  12/8/2020 0453 by Eugenie Camarena RN  Outcome: Ongoing  Note: Patient repositioned q2 hours as tolerated. Pain medication administered as prescribed. Problem: Pain:  Goal: Control of acute pain  Description: Control of acute pain  12/8/2020 0453 by Eugenie Camarena RN  Outcome: Ongoing     Problem: Pain:  Goal: Control of chronic pain  Description: Control of chronic pain  12/8/2020 0453 by Eugenie Camarena RN  Outcome: Ongoing     Problem: Skin Integrity:  Goal: Will show no infection signs and symptoms  Description: Will show no infection signs and symptoms  12/8/2020 0453 by Eugenie Camarena RN  Outcome: Ongoing     Problem: Skin Integrity:  Goal: Absence of new skin breakdown  Description: Absence of new skin breakdown  12/8/2020 0453 by Eugenie Camarena RN  Outcome: Ongoing  Note: Skin assessment completed. Patient turned q2 as tolerated.       Problem: Skin Integrity:  Goal: Demonstration of wound healing without infection will improve  Description: Demonstration of wound healing without infection will improve  12/8/2020 0453 by Eugenie Camarena RN  Outcome: Ongoing     Problem: Skin Integrity:  Goal: Complications related to intravenous access or infusion will be avoided or minimized  Description: Complications related to intravenous access or infusion will be avoided or minimized  12/8/2020 0453 by Alis Mehta Isidro Stephens RN  Outcome: Ongoing     Problem: Cardiac:  Goal: Ability to maintain vital signs within normal range will improve  Description: Ability to maintain vital signs within normal range will improve  12/8/2020 0453 by Sachi Harrison RN  Outcome: Ongoing     Problem: Cardiac:  Goal: Cardiovascular alteration will improve  Description: Cardiovascular alteration will improve  12/8/2020 0453 by Sachi Harrison RN  Outcome: Ongoing     Problem: Health Behavior:  Goal: Will modify at least one risk factor affecting health status  Description: Will modify at least one risk factor affecting health status  12/8/2020 0453 by Sachi Harrison RN  Outcome: Ongoing     Problem: Health Behavior:  Goal: Identification of resources available to assist in meeting health care needs will improve  Description: Identification of resources available to assist in meeting health care needs will improve  12/8/2020 0453 by Sachi Harrison RN  Outcome: Ongoing     Problem: Physical Regulation:  Goal: Ability to maintain vital signs within normal range will improve  Description: Ability to maintain vital signs within normal range will improve  12/8/2020 0453 by Sachi Harrison RN  Outcome: Ongoing     Problem: Physical Regulation:  Goal: Complications related to the disease process, condition or treatment will be avoided or minimized  Description: Complications related to the disease process, condition or treatment will be avoided or minimized  12/8/2020 0453 by Sachi Harrison RN  Outcome: Ongoing     Problem: Physical Regulation:  Goal: Will remain free from infection  Description: Will remain free from infection  12/8/2020 0453 by Sachi Harrison RN  Outcome: Ongoing     Problem: Nutritional:  Goal: Nutritional status will improve  Description: Nutritional status will improve  12/8/2020 0453 by Sachi Harrison RN  Outcome: Ongoing     Problem: Respiratory:  Goal: Ability to maintain normal respiratory secretions will improve  Description: Ability to maintain normal respiratory secretions will improve  12/8/2020 0453 by Sindi Robertson RN  Outcome: Ongoing     Problem: HEMODYNAMIC STATUS  Goal: Patient has stable vital signs and fluid balance  12/8/2020 0453 by Sindi Robertson RN  Outcome: Ongoing     Problem: ACTIVITY INTOLERANCE/IMPAIRED MOBILITY  Goal: Mobility/activity is maintained at optimum level for patient  12/8/2020 0453 by Sindi Robertson RN  Outcome: Ongoing     Problem: COMMUNICATION IMPAIRMENT  Goal: Ability to express needs and understand communication  12/8/2020 0453 by Sindi Robertson RN  Outcome: Ongoing     Problem: Nutrition  Goal: Optimal nutrition therapy  Description: Nutrition Problem #1: Inadequate oral intake  Intervention: Food and/or Nutrient Delivery: Continue Current Diet, Start Oral Nutrition Supplement  Nutritional Goals: po intake greater than 50%     12/8/2020 0453 by Sindi Robertson RN  Outcome: Ongoing

## 2020-12-08 NOTE — PROGRESS NOTES
Speech Language Pathology  Speech Language Pathology  [address] [place] [address]    Dysphagia Treatment Note    Date: 12/8/2020  Patients Name: Madeleine Mixon  MRN: [id number]  Diagnosis: dysphagia  Patient Active Problem List   Diagnosis Code    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    CVA (cerebral vascular accident) ([facility] I63.9    Abnormal computed tomography of cervical spine R93.7    Weight loss R63.4    Functional gait abnormality R26.89    Left knee pain M25.562    Knee pain, left M25.562    Acute pain of left knee M25.562    Sepsis due to urinary tract infection (HCC) A41.9, N39.0    Cystitis N30.90    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    Septic shock (HCC) A41.9, R65.21    Leukemoid reaction D72.823    Aspiration pneumonia of right lower lobe due to vomit (HCC) J69.0    MRSA colonization Z22.322    Urinary retention R33.9    Abdominal distention R14.0    Elevated CEA R97.0    Elevated CA 19-9 level R97.8    Cholecystitis K81.9    Elevated C-reactive protein (CRP) R79.82    Elevated procalcitonin R79.89    Bandemia D72.825    Centrilobular emphysema (HCC) J43.2    Hemorrhage of rectum and anus K62.5    GI bleed K92.2    Anemia D64.9    Small bowel obstruction (HCC) K56.609    Anxiety disorder F41.9    Noncompliance Z91.19    Acute respiratory failure (HCC) J96.00    Chronic obstructive pulmonary disease, unspecified (HCC) J44.9    Bacteremia due to coagulase-negative Staphylococcus R78.81, B95.7    Bradycardia R00.1    Fever R50.9    Abdominal wall abscess at site of surgical wound T81.49XA    Hypotension I95.9    Hospital-acquired pneumonia J18.9, Y95    Line sepsis (HCC) T85.79XA, A41.9    Pacemaker Z95.0    Bacteremia due to Staphylococcus R78.81, B95.8       Pain: 5/10    Dysphagia Treatment  Treatment time:   [id number]    Subjective: [] Alert [] Cooperative     [] Confused     [] Agitated      [x] Lethargic    Objective/Assessment:    Pt.  Unable to wake for treatment despite max cues from [address] to demonstrate Rosalia maneuver or complete simple tongue base strengthening exercises. Pt. Did not respond when asked how she was tolerating nectar thick liquids. Unable to attempt trials of nectar thick liquids as pt. Not alert at this time. Plan:  [x] Continue ST services    [] Discharge from ST:        Discharge recommendations: [] Inpatient Rehab   [x] [facility]   [] Outpatient Therapy  [] Follow up at trauma clinic   [] Other:         Treatment completed by: Cely Neumann A.CCC/SLP

## 2020-12-08 NOTE — CARE COORDINATION
Plan is still to return to [facility] when she is ready to DC. RODGER speaks with and updates facility daily.

## 2020-12-08 NOTE — PROGRESS NOTES
[address]   Occupational Therapy Evaluation  Date: 20  Patient Name: William Tinsley       Room: -  MRN: [id number]  Account: [de-identified]   : 1950  (71 y.o.) Gender: female     Discharge Recommendations:  No acute OT goals identified for during hospitalization, may benefit from therapy at discharge to address OT needed within facility       Referring Practitioner: Jefe Welch MD  Diagnosis: Hypotension  Additional Pertinent Hx: 33-year-old female with a history of CVA she is a nursing home patient who comes in today with low blood pressure history is unable to be completely obtained secondary to the patient's baseline mental status. Per nursing home report the patient had a low blood pressure they did order her some fluids she received 250 cc bolus however they were unable to further obtain access as the IV infiltrated and they are requesting IV fluids. Past Medical History:  has a past medical history of Abnormal computed tomography of cervical spine, CVA (cerebral vascular accident) ([address] GERD (gastroesophageal reflux disease), Hypertension, Paraproteinemia, and Weight loss. Past Surgical History:   has a past surgical history that includes pacemaker placement (2011); intubation (2019); Upper gastrointestinal endoscopy (N/A, 2019); laparoscopy (N/A, 5/15/2019); Cholecystectomy (N/A, 5/15/2019); Abdomen surgery (N/A, 2019); Gastrostomy tube placement (N/A, 2019); and Peg Tube Removal (N/A, 2019). Restrictions  Restrictions/Precautions: General Precautions, Fall Risk, Isolation, Contact Precautions, Modified Diet(DROPLET/CONTACT precautions; nectar thick liquids)  Implants present? : Pacemaker  Other position/activity restrictions: hx CVA 8 years ago L side       Subjective  Subjective: \"stop it, stop it! it hurts, someone help me! \" patient yelling out in pain at attempts to mobilize patient to edge of bed  Comments: VICKY Copeland ok'd OT/PT Evaluation this date. Patient painful, yelling out, resisting therapy. Agreeable to attempt edge of bed with encouragement.   Overall Orientation Status: Impaired  Orientation Level: Disoriented to time, Oriented to situation, Oriented to person, Oriented to place(states November; knows name of hospital but cant state location at first)  Vision  Vision: Within Functional Limits  Hearing  Hearing: Exceptions to [facility]  Hearing Exceptions: Hard of hearing/hearing concerns  Social/Functional History  Type of Home: Facility  Home Layout: One level  Home Equipment: [address] Help From: Personal care attendant  ADL Assistance: Needs assistance(assist with bathing (bed bath), toileting (briefs), dressing)  Homemaking Assistance: (facility provides meals/laundry)  Homemaking Responsibilities: No(facilitly)  Ambulation Assistance: (uses wheelchair- nonambulatory for ~7 years)  Transfer Assistance: (reports using a mariaelena)  Active : No  Occupation: Retired  Additional Comments: Reports using mariaelena lift at nursing home, not able to propel own wheelchair  Pain Assessment  Pain Assessment: 0-10  Pain Level: 8  Pain Type: Chronic pain  Pain Location: Generalized  Pain Descriptors: Discomfort  Pain Frequency: Continuous    Objective      Cognition  Overall Cognitive Status: Exceptions  Arousal/Alertness: Inconsistent responses to stimuli(reports fatigue/sleepiness, frequently waking back up when asking questions)  Cognition Comment: agitated, resistive to therapy   Sensation  Overall Sensation Status: (questionable sensation on L side)   ADL  Feeding: Setup  Grooming: Minimal assistance  UE Bathing: Dependent/Total, Maximum assistance  LE Bathing: Dependent/Total  UE Dressing: Dependent/Total  LE Dressing: Dependent/Total  Toileting: Dependent/Total  Additional Comments: Patient reports baseline functioning with ADL tasks, reports assist with all dressing/bathing at baseline (performs with assist from nursing staff), using brief to toilet (external female catheter in hospital), mariealena transfer to wheelchair. Reports she is R handed and is able to feed self and brush own teeth.     UE Function           LUE Strength  LUE Strength Comment: patient refused therapist assessment - reports non-functional from previous CVA, edema present- positioned on pillow prior to exit     LUE Tone: Hypotonic, Hypertonic(patient very guarded, does not allow therapist to assess UE)  Tone Description: flaccid- no active movements observed; contracture like features in wrist/hand however unable to assess     LUE AROM (degrees)  LUE General AROM: patient refused therapist assessment - reports non-functional from previous CVA, edema present- positioned on pillow prior to exit     Left Hand AROM (degrees)  Left Hand General AROM: patient refused therapist assessment - reports non-functional from previous CVA, edema present- positioned on pillow prior to exit  RUE Strength  Gross RUE Strength: WFL      RUE Tone: Normotonic     RUE AROM (degrees)  RUE AROM : WFL     Right Hand AROM (degrees)  Right Hand AROM: WFL    Fine Motor Skills  Coordination  Movements Are Fluid And Coordinated: No  Coordination and Movement description: Right UE   Comment: LUE flaccid- no active movements noted, from previous CVA, edema throughout, contracture like features; patient very guarded, does not allow therapist to assess UE- reports no braces used for positioning in the past               Quality of Movement Other  Comment: LUE flaccid- no active movements noted, from previous CVA, edema throughout, contracture like features; patient very guarded, does not allow therapist to assess UE- reports no braces used for positioning in the past       Mobility  Supine to Sit: 2 Person assistance, Maximum assistance  Sit to Supine: Dependent/Total, 2 Person assistance       Balance  Sitting Balance: Maximum assistance(seated 1-2 minutes- patient yelling out reports pain, wanting to lay back down, kyphotic posture, heavy L/posterior lean)  Standing Balance: Unable to assess(comment)     Functional Mobility  Functional Mobility Comments: patient non-ambulatory for ~7 years  Bed mobility  Rolling to Left: Dependent/Total;2 Person assistance  Rolling to Right: Dependent/Total;2 Person assistance  Supine to Sit: 2 Person assistance;Maximum assistance  Sit to Supine: Dependent/Total;2 Person assistance  Scooting: Maximal assistance  Comment: reports dizziness when seated edge of bed, reports mariaelena transfer to chair at baseline     Transfers  Sit to stand: Unable to assess  Stand to sit: Unable to assess  Transfer Comments: reports mariaelena transer at baseline      Assessment  Assessment: Patient reports/appears at baseline for ADL tasks at this time. Reports mariaelena transfer to wheelchair and assist for all care. PT to address positioning/bed mobility/ROM.  No acute OT goals identified for during hospitalization, may benefit from therapy at discharge to address OT needed within facility  Decision Making: Low Complexity  REQUIRES OT FOLLOW UP: No  No Skilled OT: At baseline function, No OT goals identified  Activity Tolerance: Patient limited by pain, Treatment limited secondary to agitation         Functional Outcome Measures  AM-PAC Daily Activity Inpatient   How much help for putting on and taking off regular lower body clothing?: Total  How much help for Bathing?: Total  How much help for Toileting?: Total  How much help for putting on and taking off regular upper body clothing?: Total  How much help for taking care of personal grooming?: A Lot  How much help for eating meals?: A Little  [facility] Inpatient Daily Activity Raw Score: 9  AM-PAC Inpatient ADL T-Scale Score : 25.33  ADL Inpatient CMS 0-100% Score: 79.59  ADL Inpatient CMS G-Code Modifier : CL       Goals  Short term goals  Time Frame for Short term goals: N/A - eval only    Plan  Safety Devices  Safety Devices in place: Yes  Type of devices: Bed alarm in place, Call light within reach, Left in bed     Plan  Times per week: Evaluated and Discharged - No OT goals identified  OT Education  OT Education: OT Role  Barriers to Learning: agitation, resistive to therapy, pain       OT Individual Minutes  Time In: 1312  Time Out: [address]  Minutes: 25    Electronically signed by Greta Crowell OT on 12/8/20 at 4:25 PM EST

## 2020-12-08 NOTE — PROGRESS NOTES
[id number]    PROGRESS NOTE             12/8/2020    8:28 AM    Name:   Pa Lang  MRN:     [id number]     Acct:      [de-identified]   Room:   2098/2098-01  IP Day:  5  Admit Date:  12/3/2020  1:20 AM    PCP:  Kraig Kay MD  Code Status:  Full Code    Subjective:     C/C:   Chief Complaint   Patient presents with    Fatigue     Interval History Status:   She was seen and evaluated by bedside. No acute events overnight. Patient patient was seen sleeping comfortably in bed. Patient is arousable. Denies chest pain, but complains of difficulty breathing. Currently on 2 L of nasal cannula oxygen. Patient then reported that she needs to urinate and feel the urge for bowel movement. Per RN, bladder scan shows 85 mL and urine output last night 200 mL. Patient placed on IV normal saline 50 mL/h and 250 mL bolus. Follow-up with BMP and CXR. Plan to have PICC line placed today. Per ID recommendation, will continue IV Ancef day 2 and IV Levaquin day 5. Upon discharge, patient will be placed on IV Ancef for 5 weeks and oral antibiotics. Per pulmonary recommendation, follow-up with CT chest in 4 to 6 weeks with possible lung biopsy. Brief History:     Please review H&P    Review of Systems:     Review of Systems   Constitutional: Positive for appetite change. Negative for activity change, chills and fever. On dental soft food and nectar thick diet. Complains of pain with light touch. HENT: Negative for congestion. Respiratory: Positive for shortness of breath. Negative for chest tightness and wheezing. Cardiovascular: Negative for chest pain, palpitations and leg swelling. Gastrointestinal: Negative for abdominal pain, constipation, diarrhea and nausea. Genitourinary: Negative for difficulty urinating and dysuria. Not produce much urine   Musculoskeletal: Negative for back pain and joint swelling. Skin: Negative for rash. Neurological: Negative for dizziness, weakness and headaches. Psychiatric/Behavioral: Negative for agitation and behavioral problems. Medications: Allergies: Allergies   Allergen Reactions    Penicillins Shortness Of Breath and Rash    Amoxicillin        Current Meds:   Scheduled Meds:    sodium chloride  250 mL Intravenous Once    traZODone  50 mg Oral Nightly    ceFAZolin  2 g Intravenous Q8H    metoprolol tartrate  50 mg Oral BID    levofloxacin  750 mg Intravenous Q24H    sodium chloride  500 mL Intravenous Once    budesonide-formoterol  2 puff Inhalation BID    divalproex  125 mg Oral BID    sertraline  50 mg Oral Daily    mirtazapine  7.5 mg Oral Nightly    levothyroxine  125 mcg Oral Daily    baclofen  10 mg Oral BID    sodium chloride flush  10 mL Intravenous 2 times per day    enoxaparin  40 mg Subcutaneous Daily     Continuous Infusions:    sodium chloride       PRN Meds: perflutren lipid microspheres, sodium chloride flush, ipratropium-albuterol, HYDROcodone-acetaminophen, sodium chloride flush, potassium chloride **OR** potassium alternative oral replacement **OR** potassium chloride, magnesium sulfate, acetaminophen **OR** acetaminophen, polyethylene glycol, promethazine **OR** ondansetron    Data:     Past Medical History:   has a past medical history of Abnormal computed tomography of cervical spine, CVA (cerebral vascular accident) ([address] GERD (gastroesophageal reflux disease), Hypertension, Paraproteinemia, and Weight loss. Social History:   reports that she has quit smoking. She has a 30.00 pack-year smoking history. She has never used smokeless tobacco. She reports previous alcohol use of about 28.0 standard drinks of alcohol per week. She reports that she does not use drugs. Family History: History reviewed. No pertinent family history.     Vitals:  BP (!) 148/56   Pulse 73   Temp 99 °F (37.2 °C) (Oral)   Resp 16   Ht 5' weight based adjustment of the mA/kV was utilized to reduce the radiation dose to as low as reasonably achievable. COMPARISON: Chest radiograph 12/03/2020, CT abdomen and pelvis 12/03/2020. HISTORY: ORDERING SYSTEM PROVIDED HISTORY: pneumonia TECHNOLOGIST PROVIDED HISTORY: pneumonia Reason for Exam: f/u pneumonia Acuity: Unknown Type of Exam: Unknown FINDINGS: Mediastinum: Suspected 17 mm lymph node posterior to the left medial clavicle appears to have increased in size from a 2012 chest CT. No other definite lymphadenopathy. No pericardial effusion. Coronary artery calcifications are seen. The thoracic aorta is nonaneurysmal.  The main pulmonary artery is not significantly dilated. Right IJ CVC catheter tip is in the SVC. There is a left chest wall cardiac device with multiple leads. Lungs/pleura: The central airways are patent. Small bilateral pleural effusions. No pneumothorax is seen. Emphysematous changes are seen in the lungs. Similar appearance to the CT abdomen and pelvis from 12/03/2020, there is a cavitary mass in the right lower lobe abutting the pleura, measuring 4.6 x 4.4 x 3.5 cm. There is dependent atelectasis in the right lung. There is a rounded opacity in the left lower lobe broadly abutting the pleura. Additional airspace opacities seen in the left lower lobe. There are also areas of atelectasis in the left lingula and left lower lobe. 3 mm right upper lobe pulmonary nodule (series 601, image 60). Upper Abdomen: Limited images of the upper abdomen are unremarkable. 4 mm hypodensity in the liver is too small to characterize but also seen on the comparison abdomen CT (series 2, image 111). Soft Tissues/Bones: No acute bony abnormality. There are old left-sided rib fractures. Approximately 4.6 x 4.4 x 3.5 cm cavitary right lower lobe mass, similar to the recent abdomen and pelvis CT from 12/03/2020. This is new from a CT dated 07/25/2019.   This could represent a cavitary pneumonia however a primary lung neoplasm is not excluded. Recommend short-term follow-up CT after treatment to demonstrate interval change. Rounded opacity in the left lower lobe broadly abutting the pleura could represent rounded atelectasis or infection. Additional airspace opacities in the left lower lobe are suspicious for infection. This area can also be followed up with CT. Small bilateral pleural effusions. 3 mm right upper lobe pulmonary nodule. Follow-up recommendations are below. Suspected enlarged lymph node in the left upper mediastinum posterior to the medial left clavicle, nonspecific. RECOMMENDATIONS: Fleischner Society guidelines for follow-up and management of incidentally detected pulmonary nodules: Nodule size less than 6 mm In a low-risk patient, no routine follow-up. In a high-risk patient, optional CT at 12 months. - Low risk patients include individuals with minimal or absent history of smoking and other known risk factors. - High risk patients include individuals with a history or smoking or known risk factors. Radiology 2017 http://pubs. rsna.org/doi/full/10.1148/radiol. 9075314782     Ct Abdomen Pelvis W Iv Contrast Additional Contrast? None    Result Date: 12/3/2020  EXAMINATION: CT OF THE ABDOMEN AND PELVIS WITH CONTRAST 12/3/2020 2:49 am TECHNIQUE: CT of the abdomen and pelvis was performed with the administration of intravenous contrast. Multiplanar reformatted images are provided for review. Dose modulation, iterative reconstruction, and/or weight based adjustment of the mA/kV was utilized to reduce the radiation dose to as low as reasonably achievable. COMPARISON: August 13, 2019. HISTORY: ORDERING SYSTEM PROVIDED HISTORY: global abdominal pain TECHNOLOGIST PROVIDED HISTORY: global abdominal pain Reason for Exam: Fatigue, abd pain.  Due to patient condition, unable to have patient lay flat  or bring arms above head Acuity: Acute Type of Exam: Initial Relevant Medical/Surgical History: History, Gtube/peg tube placement, cholecystectomy FINDINGS: Lower Chest: Partially visualized left lower lobe heterogeneous consolidation. Trace left pleural fluid with pleural thickening. Right lower lobe 4.2 x 3.3 cm masslike opacity with central necrosis, central air-fluid level and mild surrounding heterogeneous/nodular opacities. Emphysema. Partially visualized cardiac pacer leads. Atherosclerosis of the visualized thoracic aorta. Liver: Few scattered subcentimeter hypodensities in the liver are similar to the prior study and likely represent benign cysts. No new suspicious liver mass. Smooth liver contour. Gallbladder and Bile Ducts: Prior cholecystectomy. Spleen: Normal. Adrenal Glands: Normal. Pancreas: Normal. Genitourinary: Normal. Bowel: The stomach is incompletely distended and not well evaluated. Postsurgical changes of the bowel. Normal caliber bowel. Fluid throughout the colon. Vasculature: Atherosclerosis. No abdominal aortic aneurysm. Patent main portal vein. Bones and Soft Tissues: Osteopenia. Degenerative changes in the visualized spine and pelvis. L3 superior endplate 58-71% height loss was not present on the prior study but appears to be chronic. Retroperitoneum/Mesentery: No intraperitoneal free air, ascites or fluid collection. No lymphadenopathy in the abdomen or pelvis. Fluid throughout the colon suggests enteritis. No bowel obstruction. Postsurgical changes of the bowel. Partially visualized left lower lobe heterogeneous consolidation with trace left basilar pleural fluid and pleural thickening. There is also a cavitary 4.3 cm mass in the right lower lobe which demonstrates a small air-fluid level. Findings may relate to infection. Underlying neoplastic process is not excluded. Short-term follow-up chest CT may be helpful for further evaluation.      Xr Chest Portable    Result Date: 12/7/2020  EXAMINATION: ONE XRAY VIEW OF THE CHEST 12/7/2020 12:04 pm COMPARISON: 12/03/2020 HISTORY: ORDERING SYSTEM PROVIDED HISTORY: worsening sob TECHNOLOGIST PROVIDED HISTORY: worsening sob Reason for Exam: PT CO cough with SOB X several days. Best images per pt condition and cooperation. Acuity: Chronic Type of Exam: Ongoing FINDINGS: Transvenous pacer remains in place. Increased pulmonary opacity right base. Increased left pleural effusion. Left basilar opacity. No pneumothorax. Increased bibasilar opacities could represent atelectasis or pneumonia Increased left pleural effusion     Xr Chest Portable    Result Date: 12/3/2020  EXAMINATION: ONE XRAY VIEW OF THE CHEST 12/3/2020 5:17 am COMPARISON: Earlier same day. HISTORY: ORDERING SYSTEM PROVIDED HISTORY: post RIJ central line TECHNOLOGIST PROVIDED HISTORY: post RIJ central line Reason for Exam: Right central line placement. Acuity: Acute Type of Exam: Initial Additional signs and symptoms: Right central line placement. FINDINGS: Interval placement of a right IJ central venous catheter with tip in the mid SVC. No pneumothorax. Redemonstration of low left lung volume and hyperexpanded right lung volume. Similar appearing left greater than right basilar airspace disease with left basilar consolidation and right lateral basilar masslike opacity. Small left pleural effusion. Stable left pectoral trans venous dual-chamber cardiac pacer device. Stable cardiomediastinal silhouette and great vessels. Interval placement of a right IJ central venous catheter with tip in the mid SVC. No pneumothorax. Otherwise, no significant interval change with redemonstration of bilateral basilar airspace disease with left basilar consolidation and right lateral basilar masslike opacity. Continued attention on follow-up recommended. Small left pleural effusion. Xr Chest Portable    Result Date: 12/3/2020  EXAMINATION: ONE XRAY VIEW OF THE CHEST 12/3/2020 2:20 am COMPARISON: May 29, 2019.  HISTORY: ORDERING SYSTEM PROVIDED HISTORY: weakness TECHNOLOGIST PROVIDED HISTORY: weakness Reason for Exam: Pt c/o weakness, low blood pressure. Acuity: Acute Type of Exam: Initial Additional signs and symptoms: Pt c/o weakness, low blood pressure. FINDINGS: Frontal portable view of the chest.  Low left lung volume. Hyperexpanded right lung volume. Leftward shift of the mediastinal structures. Left greater than right basilar heterogeneous opacities. Small bilateral pleural effusions versus pleural thickening. No pneumothorax. Atherosclerotic thoracic aorta. No significant cardiomegaly. Left pectoral trans venous dual-chamber cardiac pacer device. Osteopenia. Multilevel degenerative disc disease. Left greater than right basilar heterogeneous opacities. Differential considerations include atelectasis/scarring, asymmetric pulmonary edema and an infectious/inflammatory process. Follow-up PA and lateral chest radiographs or chest CT may be helpful for further evaluation as warranted. Small bilateral pleural effusions versus pleural thickening. Low left lung volume with leftward shift of the mediastinal structures. Hyperexpanded right lung volume. Fl Modified Barium Swallow W Video    Result Date: 12/7/2020  EXAMINATION: MODIFIED BARIUM SWALLOW WAS PERFORMED IN CONJUNCTION WITH SPEECH PATHOLOGY [facility] TECHNIQUE: Fluoroscopic evaluation of the swallowing mechanism was performed with multiple consistency of barium product. FLUOROSCOPY DOSE AND TYPE OR TIME AND EXPOSURES: Fluoro time 2:08 min DAP 86.2 dGy cm2 AIR KERMA 16.6 mGy COMPARISON: None HISTORY: ORDERING SYSTEM PROVIDED HISTORY: r/o aspiration TECHNOLOGIST PROVIDED HISTORY: r/o aspiration Reason for Exam: r/o aspiration Acuity: Acute Type of Exam: Initial FINDINGS: Oral phase of swallowing was grossly within normal limits. Deep penetration noted with thin liquid consistency. Premature vallecular spillage with all consistencies.  Prolonged mastication of regular solid consistency due to sparse dentition. No evidence of aspiration. Deep penetration with thin liquid consistency. Please see separate speech pathology report for full discussion of findings and recommendations. Physical Examination:        Physical Exam  Constitutional:       General: She is not in acute distress. Appearance: Normal appearance. She is normal weight. She is not ill-appearing or toxic-appearing. HENT:      Head: Normocephalic and atraumatic. Cardiovascular:      Rate and Rhythm: Normal rate and regular rhythm. Pulses: Normal pulses. Heart sounds: Normal heart sounds. No murmur. Pulmonary:      Effort: Pulmonary effort is normal. No respiratory distress. Breath sounds: Normal breath sounds. No stridor. No wheezing or rhonchi. Abdominal:      General: Abdomen is flat. Bowel sounds are normal.      Palpations: Abdomen is soft. Tenderness: There is no abdominal tenderness. There is no guarding or rebound. Comments: Surgical incision along mid-abdomen   Genitourinary:     Comments: Urinate in external wick. Bladder scan shows 85ml. Only produced 200ml overnight. Musculoskeletal:      Right lower leg: No edema. Left lower leg: No edema. Skin:     General: Skin is warm and dry. Comments: Sensitive to light touch, causes her pain. Neurological:      Mental Status: She is alert.            Assessment:        Primary Problem  Hospital-acquired pneumonia    Active Hospital Problems    Diagnosis Date Noted    Pacemaker [Z95.0] 12/05/2020    Bacteremia due to Staphylococcus [R78.81, B95.8]     Line sepsis ([facility] [T85.79XA, A41.9] 12/04/2020    Hypotension [I95.9] 12/03/2020    Hospital-acquired pneumonia [J18.9, Y95] 12/03/2020    Chronic obstructive pulmonary disease, unspecified (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 [address] [I63.9]        Plan:        Septic shock secondary to colonized indwelling line  -T 99.0, HR 73, RR 16, /56  -WBC 13.6 from 11.7 yesterday, trending upward  -Levophed discontinued  -Blood cultures x2+ for staph capitis and staph hominis sensitive to gentamicin, oxacillin, Bactrim  -Per infectious disease  -continue IV Ancef, day 2 and IV Levaquin, day 5.    -PICC line for IV antibiotics upon discharge.    -5 weeks of IV Ancef for MSSA bacteremia and 3 weeks of oral antibiotics for possible lung abscess.   -Likely switch patient from levofloxacin to Bactrim, will follow infectious disease recommendations  -Catheter to culture growing coagulase-negative staph  -Repeat blood cultures x2 negative for 3 days  -Infectious disease consulted, following recommendations     Cavitary right lower lobe lobe mass; infectious versus malignant etiology  -CT chest without contrast 10/6/2020 displaying 4.6 x 4.4 x 3.5 cm cavitary right lower lobe mass  -Per pulmonology  -Follow-up CT chest in 4 to 6 weeks with possible lung biopsy at that time  -Pulmonology consulted following recommendations  -Appreciate ID recommendations concerning antibiotics     Sedation secondary to polypharmacy   -Patient arousable but complains of fatigue  -Noted that nightly medications include trazodone, mirtazapine, metoprolol, baclofen, Norco, and Depakote  -Trazodone decreased from 100 mg per night to 50 mg per night yesterday 12/7  -We will reassess patient for improvement in somnolence     Chronic diastolic CHF with reduced ejection fraction  -Pro BNP shows 980, lower than past readings  -Echo (4/2019) shows LVEF 45%.  Evidence of diastolic dysfunction  -Echo (12/2020) shows LVEF>55%.  Pacemaker present in the right heart chambers.  Small LV cavity. -CXR (12/7) shows increased bibasilar opacities, could represent atelectasis or pneumonia.   Increased left pleural effusion.  -Order with BNP, CXR  -Lasix 20mg given 1 dose

## 2020-12-08 NOTE — PROGRESS NOTES
Notified residents of urine output overnight of 200 with PVR of 85 per night shift. No new orders at this time.

## 2020-12-08 NOTE — PROGRESS NOTES
Physical Therapy    Facility/Department: [facility] PROGRESSIVE CARE  Initial Assessment    NAME: Alpesh Zazueta  : 1950  MRN: [id number]    Date of Service: 2020    Discharge Recommendations:  Patient would benefit from continued therapy after discharge   PT Equipment Recommendations  Equipment Needed: No    Assessment   Body structures, Functions, Activity limitations: Decreased functional mobility ; Decreased ADL status; Decreased ROM; Decreased strength;Decreased safe awareness;Decreased cognition;Decreased balance;Decreased endurance;Decreased posture; Increased pain  Assessment: Pt requiring 2 assist for bed mobility - pt limited by pain and needs encouragement. Pt would benefit from continued PT. Treatment Diagnosis: Impaired functional mobility 2* increased pain and weakness  Specific instructions for Next Treatment: increase endurance for activity, ROM, bed mobility, positioning, strengthening/stretching  Prognosis: Guarded  Decision Making: Medium Complexity  History: 64-year-old female with a history of CVA she is a nursing home patient who comes in today with low blood pressure history is unable to be completely obtained secondary to the patient's baseline mental status. Per nursing home report the patient had a low blood pressure they did order her some fluids she received 250 cc bolus however they were unable to further obtain access as the IV infiltrated and they are requesting IV fluids. Exam: ROM, MMT, bed mobility, sitting balance and posture, activity tolerance  Clinical Presentation: Pt cooperative with encouragement, yelling out in pain. Barriers to Learning: cognition, pain, safety awareness  REQUIRES PT FOLLOW UP: Yes  Activity Tolerance  Activity Tolerance: Patient limited by pain; Patient limited by cognitive status       Patient Diagnosis(es): The primary encounter diagnosis was Hypotension, unspecified hypotension type.  A diagnosis of Pneumonia due to organism was also pertinent to this visit.     has a past medical history of Abnormal computed tomography of cervical spine, CVA (cerebral vascular accident) ([address] GERD (gastroesophageal reflux disease), Hypertension, Paraproteinemia, and Weight loss. has a past surgical history that includes pacemaker placement (07/2011); intubation (4/14/2019); Upper gastrointestinal endoscopy (N/A, 4/16/2019); laparoscopy (N/A, 5/15/2019); Cholecystectomy (N/A, 5/15/2019); Abdomen surgery (N/A, 5/25/2019); Gastrostomy tube placement (N/A, 5/25/2019); and Peg Tube Removal (N/A, 7/27/2019). Restrictions  Restrictions/Precautions  Restrictions/Precautions: General Precautions, Fall Risk, Isolation, Contact Precautions, Modified Diet(DROPLET/CONTACT precautions; nectar thick liquids)  Implants present? : Pacemaker  Position Activity Restriction  Other position/activity restrictions: hx CVA 8 years ago L side  Vision/Hearing  Vision: Within Functional Limits  Hearing: Exceptions to [facility] Exceptions: Hard of hearing/hearing concerns     Subjective  General  Chart Reviewed: Yes  Patient assessed for rehabilitation services?: Yes  Additional Pertinent Hx: CVA  Family / Caregiver Present: No  Referring Practitioner: Dr Rivers Guard  Referral Date : 12/08/20  Diagnosis: hypotension  Follows Commands: Impaired(with repetition, intermittent following of commands)  Subjective  Subjective: VICKY Mehta. Pt is agreeable to minimal PT OT co eval however requires alot of encouragement. Pt yelling out in pain without any tactile stim. Writer educated pt on evaluation and encouraged mobility for pt benefit.   Pain Screening  Patient Currently in Pain: Yes  Pain Assessment  Pain Assessment: 0-10  Pain Level: 8  Pain Type: Chronic pain  Pain Location: Generalized  Pain Descriptors: Discomfort  Pain Frequency: Continuous  Pain Onset: On-going  Clinical Progression: Gradually worsening  Functional Pain Assessment: Prevents or interferes with all active and some passive activities  Non-Pharmaceutical Pain Intervention(s): Ambulation/Increased Activity; Distraction;Elevation; Emotional support;Repositioned; Rest  Response to Pain Intervention: None  Vital Signs  Patient Currently in Pain: Yes  Oxygen Therapy  O2 Device: Nasal cannula  O2 Flow Rate (L/min): 2 L/min  Patient Observation  Observations: L UE edema, flaccid L UE/LE       Orientation  Orientation  Overall Orientation Status: Within Functional Limits  Social/Functional History  Social/Functional History  Type of Home: Facility  Home Layout: One level  Home Equipment: [employer] Help From: Personal care attendant  ADL Assistance: Needs assistance(assist with bathing (bed bath), toileting (briefs), dressing)  Homemaking Assistance: (facility provides meals/laundry)  Homemaking Responsibilities: No(facilitly)  Ambulation Assistance: (uses wheelchair- nonambulatory for ~7 years)  Transfer Assistance: (reports using a mariaelena)  Active : No  Occupation: Retired  Additional Comments: Reports using mariaelena lift at nursing home, not able to propel own wheelchair  Cognition        Objective          AROM RLE (degrees)  RLE AROM: WFL  RLE General AROM: guarded due to pain  PROM LLE (degrees)  LLE General PROM: pt guarded - pt refuses PROM assessment by writer  AROM LLE (degrees)  LLE General AROM: Flaccid  AROM RUE (degrees)  RUE General AROM: See OT  AROM LUE (degrees)  LUE General AROM: See OT  Strength RLE  Strength RLE: WFL  Comment: Grossly 3-/5  Strength LLE  Comment: flaccid - 0/5 no active muscle contraction observed  Strength RUE  Comment: See OT  Strength LUE  Comment: See OT  Tone RLE  RLE Tone: Normotonic  Tone LLE  LLE Tone: Hypotonic  Tone Description: flaccid  Sensation  Overall Sensation Status: (questionable sensation on L side)  Bed mobility  Rolling to Left: Dependent/Total;2 Person assistance  Rolling to Right: Dependent/Total;2 Person assistance  Supine to Sit: 2 Person assistance;Maximum assistance  Sit to Supine: Dependent/Total;2 Person assistance  Scooting: Maximal assistance  Comment: Head of bed elevated, cues for technique and sequencing. Pt yelling out in pain throughout session - rest breaks given throughout and VICKY cummings notified. Pt agreeable to attempt to dangle despite pain. Pt had mod to max x1 sitting balance with L posterolateral lean. Pt dangles for ~1-2 minutes before requesting to lay back down. Pt reports mild dizziness. Transfers  Sit to Stand: Unable to assess  Stand to sit: Unable to assess  Comment: Pt is mariaelena lift transfer at baseline  Ambulation  Ambulation?: No(non ambulatory for 7 years)  Stairs/Curb  Stairs?: No     Balance  Posture: Fair(forward head, rounded shoulders, kyphotic posture)  Sitting - Static: Fair;-  Sitting - Dynamic: Poor;+  Standing - Static: (NT)  Standing - Dynamic: (NT)  Comments: HIGH FALL Risk. Seated 1-2 minutes EOB, mod to max x1 with heavy L posterolateral lean sitting edge of bed. Other exercises  Other exercises?: Yes  Other exercises 1: Encourage positioning toward R side, elevated L UE on pillow, pillow between knees and to elevate R LE (Ankle edema)     Plan   Plan  Times per week: 4x/week  Specific instructions for Next Treatment: increase endurance for activity, ROM, bed mobility, positioning, strengthening/stretching  Current Treatment Recommendations: Strengthening, ROM, Balance Training, Functional Mobility Training, Endurance Training, Safety Education & Training, Positioning, Patient/Caregiver Education & Training, Pain Management  Safety Devices  Type of devices:  All fall risk precautions in place, Bed alarm in place, Call light within reach, Gait belt, Patient at risk for falls, Left in bed, Nurse notified(VICKY [place])  Restraints  Initially in place: No             AM-PAC Score     AM-PAC Inpatient Mobility without Stair Climbing Raw Score : 5 (12/08/20 1312)  AM-PAC Inpatient without Stair Climbing T-Scale Score : 23.59 (12/08/20

## 2020-12-08 NOTE — PROGRESS NOTES
PULMONARY PROGRESS NOTE:    REASON FOR VISIT: pneumonia  Interval History:    Shortness of Breath: no  Cough: no  Sputum: no          Hemoptysis: no  Chest Pain: no  Fever: no                   Swelling Feet: no  Headache: no                                           Nausea, Emesis, Abdominal Pain: no  Diarrhea: no         Constipation: no    Events since last visit: none    PAST MEDICAL HISTORY:      Scheduled Meds:   traZODone  50 mg Oral Nightly    ceFAZolin  2 g Intravenous Q8H    metoprolol tartrate  50 mg Oral BID    levofloxacin  750 mg Intravenous Q24H    sodium chloride  500 mL Intravenous Once    budesonide-formoterol  2 puff Inhalation BID    divalproex  125 mg Oral BID    sertraline  50 mg Oral Daily    mirtazapine  7.5 mg Oral Nightly    levothyroxine  125 mcg Oral Daily    baclofen  10 mg Oral BID    sodium chloride flush  10 mL Intravenous 2 times per day    enoxaparin  40 mg Subcutaneous Daily     Continuous Infusions:    PRN Meds:HYDROcodone 5 mg - acetaminophen, perflutren lipid microspheres, sodium chloride flush, ipratropium-albuterol, sodium chloride flush, potassium chloride **OR** potassium alternative oral replacement **OR** potassium chloride, magnesium sulfate, acetaminophen **OR** acetaminophen, polyethylene glycol, promethazine **OR** ondansetron        PHYSICAL EXAMINATION:  BP (!) 105/92   Pulse 67   Temp 98.6 °F (37 °C) (Oral)   Resp 16   Ht 5' (1.524 m)   Wt 131 lb 6.4 oz (59.6 kg)   SpO2 94%   BMI 25.66 kg/m²     General : Awake, alert,   Neck - supple, no lymphadenopathy, JVD not raised  Heart - regular rhythm, S1 and S2 normal; no additional sounds heard  Lungs - Air Entry- fair bilaterally; breath sounds : vesicular;   rales/crackles - absent  Abdomen - soft, no tenderness  Upper Extremities  - no cyanosis, mottling; edema : absent  Lower Extremities: no cyanosis, mottling; edema : absent    Current Laboratory, Radiologic, Microbiologic, and Diagnostic studies reviewed  Data ReviewCBC:   Recent Labs     12/06/20  0551 12/07/20  0454 12/08/20  0632   WBC 11.7* 13.0* 13.6*   RBC 2.67* 2.78* 2.66*   HGB 8.0* 8.3* 7.9*   HCT 23.9* 25.4* 24.2*    288 299     BMP:   Recent Labs     12/06/20  0551 12/07/20  0454 12/08/20  0632   GLUCOSE 91 78 85    136 136   K 3.7 4.2 4.0   BUN 4* 5* 6*   CREATININE <0.40* 0.48* 0.73   CALCIUM 7.7* 8.0* 8.0*     ABGs:   Recent Labs     12/07/20  1118   PHART 7.352   PO2ART 26.9*   QEV2OQC 37.4   VKC5ZLZ 20.7*   Y5QDHNOV 53.8*      PT/INR:  No results found for: PTINR    ASSESSMENT / PLAN:    Pneumonia - ABx  OK for DC with ABx  F/u CT chest 4-6 weeks; may need needle biopsy  CXR ?  PTX; repeat CXR- no PTX  Stable Pulm wise    Electronically signed by Jihan Craig on 12/08/20 at 10:33 AM.

## 2020-12-09 NOTE — PROGRESS NOTES
[facility] -   Infectious diseases evaluation  admission date 12/3/2020    reason for consultation:   Healthcare acquired pneumonia  Impression :   Current:  · Healthcare acquired pneumonia with cavitary right lower lobe mass/positive mycoplasma IgM. ·  STAPHYLOCOCCUS HOMINIS oxacillin sensitive bacteremia versus contamination could be related to peripheral lines that was removed. · Pacemaker in place  · Possible gout  · Positive MRSA swab  · Possible pneumothorax  · Hypertension  · Urinary retention  · Chronic CHF  · History of ESBL and MRSA infection    Other:  · Penicillin allergy    Recommendations   · Echocardiogram showed no obvious vegetation  · IV vancomycin discontinued 12/7/2020  · IV Ancef and IV Levaquin for now  · PICC line   ·  IV Ancef 2 g IV every 8 hours for MSSE bacteremia in the presence of pacemaker until 1/16/2020 and oral doxycycline 100 mg twice a day until 12/30/2020  · CT chest planned in 4 to 6 weeks by pulmonary with consideration for lung biopsy if no improvement  · Follow blood cultures from 12/5/2020 no growth to date  · Follow CBC renal function  · Continue supportive care  · Discussed with nursing staff          History of Present Illness:   Initial history:  Sarah Kraus is a 65y.o.-year-old female with history of CVA was brought to the ER from her nursing home for low blood pressure  for 1 day associated with generalized weakness. The patient had poor IV access and was not able to get IV fluids at the nursing home. The patient was placed on Levophed initially that was weaned off  12/2/2020 WBC 12.6, creatinine normal,  980, lactic acid 1.3, COVID-19 rapid test was negative.   Blood cultures 12/3/2012 2 grew gram-positive cocci in cluster, procalcitonin 0.08, mycoplasma IgM 1.22, C-reactive protein 156, nasal swab for MRSA was positive, respiratory molecular panel with Covid PCR was negative  Chest x-ray showed the left Initial Anesthesia Post-op Note    Patient: Patria Mendoza  Procedure(s) Performed: 4TH DEGREE LACERATION  Anesthesia type: Spinal    Vital Last Value   Temperature 36.7 °C (98.1 °F) (03/18/19 0104)   Pulse 86 (03/18/19 0120)   Respiratory Rate 16 (03/17/19 2146)   Non-Invasive   Blood Pressure 118/70 (03/18/19 0120)   Arterial  Blood Pressure     Pulse Oximetry 100 % (03/18/19 0120)     Last 24 I/O:     Intake/Output Summary (Last 24 hours) at 3/18/2019 0132  Last data filed at 3/18/2019 0047  Gross per 24 hour   Intake 800 ml   Output --   Net 800 ml       PATIENT LOCATION: PACU Phase 1  POST-OP VITAL SIGNS: stable  LEVEL OF CONSCIOUSNESS: participates in exam, awake, oriented, alert and answers questions appropriately  RESPIRATORY STATUS: spontaneous ventilation  CARDIOVASCULAR: blood pressure returned to baseline  HYDRATION: euvolemic    PAIN MANAGEMENT: well controlled  NAUSEA: None  AIRWAY PATENCY: patent  POST-OP ASSESSMENT: no complications, patient tolerated procedure well with no complications and sufficiently recovered from acute administration of anesthesia effects and able to participate in evaluation  COMPLICATIONS: none  HANDOFF:  Handoff to receiving nurse was performed and questions were answered       greater than right basilar opacities  CT abdomen pelvis suggestive of enteritis,  lower lobe consolidation, cavitary 4.3 cm mass in the right lower lobe with a small air-fluid level. Failed swallow study, diet was changed  Interval changes  12/9/2020   She is complaining of right ankle pain, denied any fever or chills, denied nausea or vomiting, no other complaints, was started on steroids for possible gout      Patient Vitals for the past 8 hrs:   BP Temp Temp src Pulse Resp SpO2   12/09/20 1800 88/69 -- -- -- -- --   12/09/20 1500 84/68 97.6 °F (36.4 °C) Axillary 91 18 96 %   12/09/20 1105 107/60 97.6 °F (36.4 °C) Axillary 91 18 97 %           I have personally reviewed the past medical history, past surgical history, medications, social history, and family history, and I haveupdated the database accordingly. Allergies:   Penicillins and Amoxicillin     Review of Systems:     Review of Systems  Decreased appetite, mild cough and shortness of breath, other than above 12 systems reviewed were negative  Physical Examination :       Physical Exam  HENT:      Head: Normocephalic and atraumatic. Right Ear: External ear normal.      Left Ear: External ear normal.      Mouth/Throat:      Pharynx: Oropharynx is clear. No posterior oropharyngeal erythema. Eyes:      General: No scleral icterus. Conjunctiva/sclera: Conjunctivae normal.   Neck:      Musculoskeletal: Neck supple. No neck rigidity. Cardiovascular:      Rate and Rhythm: Normal rate and regular rhythm. Heart sounds: No murmur. Pulmonary:      Effort: Pulmonary effort is normal.      Breath sounds: Normal breath sounds. No wheezing. Abdominal:      Palpations: Abdomen is soft. Tenderness: There is no abdominal tenderness. Genitourinary:     Comments: External urinary catheter in place  Musculoskeletal:      Right lower leg: No edema. Left lower leg: No edema.       Comments: Right ankle erythema, mild redness tenderness Skin:     General: Skin is warm. Coloration: Skin is not jaundiced. Neurological:      Mental Status: She is alert. Comments: Oriented to person          Past Medical History:     Past Medical History:   Diagnosis Date    Abnormal computed tomography of cervical spine     sclerotic bone appearance     CVA (cerebral vascular accident) ([address]     left  side weakness    GERD (gastroesophageal reflux disease)     Hypertension     Paraproteinemia     Weight loss        Past Surgical  History:     Past Surgical History:   Procedure Laterality Date    ABDOMEN SURGERY N/A 5/25/2019    ABDOMEN DEBRIDEMENT WOUND CLOSURE REOPENING OF RECENT LAPOROATOMY, ABDOMINAL WASHOUT, REPAIR FASCIAL DEHISENCE WITH MESH performed by Jj Potter DO at [facility] 5/15/2019    CHOLECYSTECTOMY performed by Jj Potter DO at [facility] 5/25/2019    GASTROSTOMY TUBE PLACEMENT performed by Jj Potter DO at [facility] N/A 7/27/2019    EGD ESOPHAGOGASTRODUODENOSCOPY WITH REMOVAL OF PEG TUBE performed by Jj Potter DO at [address] [place] [address]  4/14/2019         LAPAROSCOPY N/A 5/15/2019    LAPAROSCOPY EXPLORATORY CONVERTED TO EXPLORATORY LAPAROTOMY/ RIGHT COLON RESECTION AND ANASTAMOSIS/ OPEN CHOLECYSTECTOMY/ EXTENSIVE LYSIS OF ADHESIONS performed by Jj Potter DO at [place] [address]  07/2011    Pacemaker is Medtronic Revo (compatible). Leads placed in 1995 are NOT MRI compatible. Placed at [facility]. V's per Dr. You Arm can not have an MRI.     UPPER GASTROINTESTINAL ENDOSCOPY N/A 4/16/2019    EGD ESOPHAGOGASTRODUODENOSCOPY @ [facility]  ICU 2002 performed by Blake Santa MD at [address]       Medications:      predniSONE  20 mg Oral Daily    furosemide  20 mg Intravenous Daily    traZODone  50 mg Oral Nightly    ceFAZolin  2 g Intravenous Q8H    [Held by provider] metoprolol tartrate  50 mg Oral BID    levofloxacin  750 mg Intravenous Q24H    sodium chloride  500 mL Intravenous Once    budesonide-formoterol  2 puff Inhalation BID    divalproex  125 mg Oral BID    sertraline  50 mg Oral Daily    mirtazapine  7.5 mg Oral Nightly    levothyroxine  125 mcg Oral Daily    baclofen  10 mg Oral BID    sodium chloride flush  10 mL Intravenous 2 times per day    enoxaparin  40 mg Subcutaneous Daily       Social History:     Social History     Socioeconomic History    Marital status:       Spouse name: Not on file    Number of children: Not on file    Years of education: Not on file    Highest education level: Not on file   Occupational History    Not on file   Social Needs    Financial resource strain: Not on file    Food insecurity     Worry: Not on file     Inability: Not on file    Transportation needs     Medical: Not on file     Non-medical: Not on file   Tobacco Use    Smoking status: Former Smoker     Packs/day: 1.00     Years: 30.00     Pack years: 30.00    Smokeless tobacco: Never Used   Substance and Sexual Activity    Alcohol use: Not Currently     Alcohol/week: 28.0 standard drinks     Types: 28 Glasses of wine per week    Drug use: No    Sexual activity: Not on file   Lifestyle    Physical activity     Days per week: Not on file     Minutes per session: Not on file    Stress: Not on file   Relationships    Social connections     Talks on phone: Not on file     Gets together: Not on file     Attends Evangelical service: Not on file     Active member of club or organization: Not on file     Attends meetings of clubs or organizations: Not on file     Relationship status: Not on file    Intimate partner violence     Fear of current or ex partner: Not on file     Emotionally abused: Not on file     Physically abused: Not on file     Forced sexual activity: Not on file   Other Topics Concern    Not on file   Social History Narrative    Not on file       Family History:   History reviewed. No pertinent family history. Medical Decision Making:   I have independently reviewed/ordered the following labs:    CBC with Differential:   Recent Labs     12/08/20  0632 12/09/20  1513   WBC 13.6* 13.7*   HGB 7.9* 7.6*   HCT 24.2* 24.6*    249   LYMPHOPCT  --  3*   MONOPCT  --  8*     BMP:  Recent Labs     12/08/20  0632 12/09/20  1045    137   K 4.0 3.6*    106   CO2 22 21   BUN 6* 8   CREATININE 0.73 0.89     Hepatic Function Panel:   No results for input(s): PROT, LABALBU, BILIDIR, IBILI, BILITOT, ALKPHOS, ALT, AST in the last 72 hours. No results for input(s): RPR in the last 72 hours. No results for input(s): HIV in the last 72 hours. No results for input(s): BC in the last 72 hours. Lab Results   Component Value Date    CREATININE 0.89 12/09/2020    GLUCOSE 89 12/09/2020    GLUCOSE 87 05/21/2012       Detailed results:    Ct Chest Wo Contrast     Result Date: 12/6/2020  EXAMINATION: CT OF THE CHEST WITHOUT CONTRAST 12/4/2020 3:08 pm TECHNIQUE: CT of the chest was performed without the administration of intravenous contrast. Multiplanar reformatted images are provided for review. Dose modulation, iterative reconstruction, and/or weight based adjustment of the mA/kV was utilized to reduce the radiation dose to as low as reasonably achievable. COMPARISON: Chest radiograph 12/03/2020, CT abdomen and pelvis 12/03/2020. HISTORY: ORDERING SYSTEM PROVIDED HISTORY: pneumonia TECHNOLOGIST PROVIDED HISTORY: pneumonia Reason for Exam: f/u pneumonia Acuity: Unknown Type of Exam: Unknown FINDINGS: Mediastinum: Suspected 17 mm lymph node posterior to the left medial clavicle appears to have increased in size from a 2012 chest CT. No other definite lymphadenopathy. No pericardial effusion. Coronary artery calcifications are seen. The thoracic aorta is nonaneurysmal.  The main pulmonary artery is not significantly dilated. Right IJ CVC catheter tip is in the SVC.   There is a left chest wall cardiac device with multiple leads. Lungs/pleura: The central airways are patent. Small bilateral pleural effusions. No pneumothorax is seen. Emphysematous changes are seen in the lungs. Similar appearance to the CT abdomen and pelvis from 12/03/2020, there is a cavitary mass in the right lower lobe abutting the pleura, measuring 4.6 x 4.4 x 3.5 cm. There is dependent atelectasis in the right lung. There is a rounded opacity in the left lower lobe broadly abutting the pleura. Additional airspace opacities seen in the left lower lobe. There are also areas of atelectasis in the left lingula and left lower lobe. 3 mm right upper lobe pulmonary nodule (series 601, image 60). Upper Abdomen: Limited images of the upper abdomen are unremarkable. 4 mm hypodensity in the liver is too small to characterize but also seen on the comparison abdomen CT (series 2, image 111). Soft Tissues/Bones: No acute bony abnormality. There are old left-sided rib fractures.      Approximately 4.6 x 4.4 x 3.5 cm cavitary right lower lobe mass, similar to the recent abdomen and pelvis CT from 12/03/2020. This is new from a CT dated 07/25/2019. This could represent a cavitary pneumonia however a primary lung neoplasm is not excluded. Recommend short-term follow-up CT after treatment to demonstrate interval change. Rounded opacity in the left lower lobe broadly abutting the pleura could represent rounded atelectasis or infection. Additional airspace opacities in the left lower lobe are suspicious for infection. This area can also be followed up with CT. Small bilateral pleural effusions. 3 mm right upper lobe pulmonary nodule. Follow-up recommendations are below. Suspected enlarged lymph node in the left upper mediastinum posterior to the medial left clavicle, nonspecific.  RECOMMENDATIONS: Fleischner Society guidelines for follow-up and management of incidentally detected pulmonary nodules: Nodule size less than 6 mm In a low-risk patient, no routine follow-up. In a high-risk patient, optional CT at 12 months. - Low risk patients include individuals with minimal or absent history of smoking and other known risk factors. - High risk patients include individuals with a history or smoking or known risk factors. Radiology 2017 http://pubs. rsna.org/doi/full/10.1148/radiol. 6590398385      Ct Abdomen Pelvis W Iv Contrast Additional Contrast? None       Thank you for allowing us to participate in the care of this patient. Please call with questions. This note is created with the assistance of a speech recognition program.  While intending to generate adocument that actually reflects the content of the visit, the document can still have some errors including those of syntax and sound a like substitutions which may escape proof reading. It such instances, actual meaningcan be extrapolated by contextual diversion.     Maia Canales MD  Office: [phone]  Perfect serve / office [phone]

## 2020-12-09 NOTE — PLAN OF CARE
Problem: Falls - Risk of:  Goal: Will remain free from falls  Description: Will remain free from falls  Outcome: Ongoing   No falls this shift. Problem: Falls - Risk of:  Goal: Absence of physical injury  Description: Absence of physical injury  Outcome: Ongoing   No injury noted this shift. Problem: Pain:  Goal: Pain level will decrease  Description: Pain level will decrease  Outcome: Ongoing   Pt currently resting. Will continue to monitor. Problem: Skin Integrity:  Goal: Will show no infection signs and symptoms  Description: Will show no infection signs and symptoms  Outcome: Ongoing   No new altered skin noted this shift. Problem: Skin Integrity:  Goal: Absence of new skin breakdown  Description: Absence of new skin breakdown  Outcome: Ongoing   Monitoring. Problem: Health Behavior:  Goal: Identification of resources available to assist in meeting health care needs will improve  Description: Identification of resources available to assist in meeting health care needs will improve  Outcome: Ongoing   Monitoring. Problem: Physical Regulation:  Goal: Ability to maintain vital signs within normal range will improve  Description: Ability to maintain vital signs within normal range will improve  Outcome: Ongoing   Vitals currently stable.

## 2020-12-09 NOTE — PROGRESS NOTES
Speech Language Pathology  Speech Language Pathology  [facility]    Dysphagia Treatment Note    Date: 12/9/2020  Patients Name: Pa Lang  MRN: [id number]  Diagnosis: dysphagia  Patient Active Problem List   Diagnosis Code    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    CVA (cerebral vascular accident) ([facility] [address] I63.9    Abnormal computed tomography of cervical spine R93.7    Weight loss R63.4    Functional gait abnormality R26.89    Left knee pain M25.562    Knee pain, left M25.562    Acute pain of left knee M25.562    Sepsis due to urinary tract infection (HCC) A41.9, N39.0    Cystitis N30.90    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    Septic shock (HCC) A41.9, R65.21    Leukemoid reaction D72.823    Aspiration pneumonia of right lower lobe due to vomit ([facility] J69.0    MRSA colonization Z22.322    Urinary retention R33.9    Abdominal distention R14.0    Elevated CEA R97.0    Elevated CA 19-9 level R97.8    Cholecystitis K81.9    Elevated C-reactive protein (CRP) R79.82    Elevated procalcitonin R79.89    Bandemia D72.825    Centrilobular emphysema (HCC) J43.2    Hemorrhage of rectum and anus K62.5    GI bleed K92.2    Anemia D64.9    Small bowel obstruction (HCC) K56.609    Anxiety disorder F41.9    Noncompliance Z91.19    Acute respiratory failure (HCC) J96.00    Chronic obstructive pulmonary disease, unspecified (HCC) J44.9    Bacteremia due to coagulase-negative Staphylococcus R78.81, B95.7    Bradycardia R00.1    Fever R50.9    Abdominal wall abscess at site of surgical wound T81.49XA    Hypotension I95.9    Hospital-acquired pneumonia J18.9, Y95    Line sepsis (HCC) T85.79XA, A41.9    Pacemaker Z95.0    Bacteremia due to Staphylococcus R78.81, B95.8       Pain: 5/10    Dysphagia Treatment  Treatment time:   [id number]  Subjective: [] Alert [] Cooperative     [] Confused     [] Agitated      [x] Lethargic    Objective/Assessment:    Pt.  Awake and alert upon arrival with RN present administering oral meds. Pt had full breakfast tray, uneaten, upon arrival. Pt declining PO trials stating \"I'll throw that up\". Although diet order correct on ticket, Honey thickened (not nectar) juice was on tray table. RN notified of correct liquid consistency. Pt agreeable to trials of nectar thickened liquids (water) via straw. Pt repositioned in bed but unable to straighten head for swallowing due to pain. Pt tolerated nectar thick x 4 via straw and honey thickened liquids (with medications) via spoon with no overt s/s of aspiration 100% of the time. No food trials provided, pt refusal.     Attempted swallowing exercise program this AM. Pt initially agreeable to swallowing exercises but after 4 effortful swallows, pt refusing to continue therapy stating \"that hurts\". Pt ed provided re importance of swallowing exercise program to improve swallow function but pt continuing to refuse Toribio Skinner will continue to monitor. Plan:  [x] Continue ST services    [] Discharge from ST:        Discharge recommendations: [] Inpatient Rehab   [x] [facility]   [] Outpatient Therapy  [] Follow up at trauma clinic   [] Other:         Treatment completed by:  Nahed Prakash M.A., [id number] [facility]

## 2020-12-09 NOTE — CARE COORDINATION
Plan remains for this patient to return to [facility] when she is ready/DC'd no pre-cert required.

## 2020-12-09 NOTE — PROGRESS NOTES
[employer]    PROGRESS NOTE             12/9/2020    7:28 AM    Name:   Jason Chery  MRN:     [id number]     Acct:      [de-identified]   Room:   2098/2098-01   Day:  6  Admit Date:  12/3/2020  1:20 AM    PCP:  Esther Segal MD  Code Status:  Full Code    Subjective:     C/C:   Chief Complaint   Patient presents with    Fatigue     Interval History Status:     Patient was seen and evaluated by bedside. No acute events overnight. Patient's vitals are stable and is afrebile. Patient is on IV Lasix 20 mg daily. She produced 200 mL of urine overnight. Currently on nasal cannula 2 L oxygen, no change from yesterday. Patient still reports of difficulty breathing and and a lot of pain. Denies chest pain, abdominal pain, dysuria. Patient is on IV Ancef day 3 and Levaquin day 6. Labs were not drawn this morning yet, will continue to monitor patient's creatinine level. Infectious disease and pulmonary following, appreciate further recommendations. Brief History:     Please review H&P    Review of Systems:     Review of Systems   Constitutional: Negative for activity change, appetite change, chills and fever. On dental soft food and nectar thick diet. Complains of pain all over and with light touch. HENT: Negative for congestion. Respiratory: Positive for shortness of breath. Negative for chest tightness and wheezing. Cardiovascular: Negative for chest pain, palpitations and leg swelling. Gastrointestinal: Negative for abdominal pain, constipation, diarrhea and nausea. Genitourinary: Negative for difficulty urinating and dysuria. Self reports she does not produce much urine   Musculoskeletal: Negative for back pain and joint swelling. Skin: Negative for rash. Neurological: Negative for dizziness, weakness and headaches. Psychiatric/Behavioral: Negative for agitation and behavioral problems. Medications: Allergies: Allergies   Allergen Reactions    Penicillins Shortness Of Breath and Rash    Amoxicillin        Current Meds:   Scheduled Meds:    furosemide  20 mg Intravenous Daily    traZODone  50 mg Oral Nightly    ceFAZolin  2 g Intravenous Q8H    [Held by provider] metoprolol tartrate  50 mg Oral BID    levofloxacin  750 mg Intravenous Q24H    sodium chloride  500 mL Intravenous Once    budesonide-formoterol  2 puff Inhalation BID    divalproex  125 mg Oral BID    sertraline  50 mg Oral Daily    mirtazapine  7.5 mg Oral Nightly    levothyroxine  125 mcg Oral Daily    baclofen  10 mg Oral BID    sodium chloride flush  10 mL Intravenous 2 times per day    enoxaparin  40 mg Subcutaneous Daily     Continuous Infusions:   PRN Meds: HYDROcodone 5 mg - acetaminophen, perflutren lipid microspheres, sodium chloride flush, ipratropium-albuterol, sodium chloride flush, potassium chloride **OR** potassium alternative oral replacement **OR** potassium chloride, magnesium sulfate, acetaminophen **OR** acetaminophen, polyethylene glycol, promethazine **OR** ondansetron    Data:     Past Medical History:   has a past medical history of Abnormal computed tomography of cervical spine, CVA (cerebral vascular accident) ([address] GERD (gastroesophageal reflux disease), Hypertension, Paraproteinemia, and Weight loss. Social History:   reports that she has quit smoking. She has a 30.00 pack-year smoking history. She has never used smokeless tobacco. She reports previous alcohol use of about 28.0 standard drinks of alcohol per week. She reports that she does not use drugs. Family History: History reviewed. No pertinent family history.     Vitals:  BP (!) 126/52   Pulse 72   Temp 97.2 °F (36.2 °C) (Axillary)   Resp 18   Ht 5' (1.524 m)   Wt 140 lb 14.4 oz (63.9 kg)   SpO2 94%   BMI 27.52 kg/m²   Temp (24hrs), Av.8 °F (36.6 °C), Min:97.2 °F (36.2 °C), Max:98.6 °F (37 °C)    No results for input(s): POCGLU in the last 72 hours. I/O(24Hr): Intake/Output Summary (Last 24 hours) at 12/9/2020 0728  Last data filed at 12/9/2020 0505  Gross per 24 hour   Intake 800 ml   Output 900 ml   Net -100 ml       Labs:    CBC with Differential:    Lab Results   Component Value Date    WBC 13.6 12/08/2020    RBC 2.66 12/08/2020    RBC 3.66 05/23/2012    HGB 7.9 12/08/2020    HCT 24.2 12/08/2020     12/08/2020     05/23/2012    MCV 91.0 12/08/2020    MCH 29.8 12/08/2020    MCHC 32.7 12/08/2020    RDW 15.4 12/08/2020    METASPCT 1 05/25/2019    LYMPHOPCT 8 12/05/2020    MONOPCT 3 12/05/2020    MYELOPCT 2 12/05/2020    BASOPCT 0 12/05/2020    MONOSABS 0.34 12/05/2020    LYMPHSABS 0.90 12/05/2020    EOSABS 0.22 12/05/2020    BASOSABS 0.00 12/05/2020    DIFFTYPE NOT REPORTED 12/05/2020     BMP:    Lab Results   Component Value Date     12/08/2020    K 4.0 12/08/2020     12/08/2020    CO2 22 12/08/2020    BUN 6 12/08/2020    LABALBU 2.3 12/03/2020    LABALBU 4.6 05/17/2012    CREATININE 0.73 12/08/2020    CALCIUM 8.0 12/08/2020    GFRAA >60 12/08/2020    LABGLOM >60 12/08/2020    GLUCOSE 85 12/08/2020    GLUCOSE 87 05/21/2012       Lab Results   Component Value Date/Time    SPECIAL RIGHT AC10 ML 12/05/2020 12:54 PM    SPECIAL RIGHT WRIST 3ML 12/05/2020 12:54 PM     Lab Results   Component Value Date/Time    CULTURE NO GROWTH 3 DAYS 12/05/2020 12:54 PM    CULTURE NO GROWTH 3 DAYS 12/05/2020 12:54 PM         Radiology:    Ct Chest Wo Contrast    Result Date: 12/6/2020  EXAMINATION: CT OF THE CHEST WITHOUT CONTRAST 12/4/2020 3:08 pm TECHNIQUE: CT of the chest was performed without the administration of intravenous contrast. Multiplanar reformatted images are provided for review. Dose modulation, iterative reconstruction, and/or weight based adjustment of the mA/kV was utilized to reduce the radiation dose to as low as reasonably achievable.  COMPARISON: Chest radiograph 12/03/2020, CT abdomen and pelvis 12/03/2020. HISTORY: ORDERING SYSTEM PROVIDED HISTORY: pneumonia TECHNOLOGIST PROVIDED HISTORY: pneumonia Reason for Exam: f/u pneumonia Acuity: Unknown Type of Exam: Unknown FINDINGS: Mediastinum: Suspected 17 mm lymph node posterior to the left medial clavicle appears to have increased in size from a 2012 chest CT. No other definite lymphadenopathy. No pericardial effusion. Coronary artery calcifications are seen. The thoracic aorta is nonaneurysmal.  The main pulmonary artery is not significantly dilated. Right IJ CVC catheter tip is in the SVC. There is a left chest wall cardiac device with multiple leads. Lungs/pleura: The central airways are patent. Small bilateral pleural effusions. No pneumothorax is seen. Emphysematous changes are seen in the lungs. Similar appearance to the CT abdomen and pelvis from 12/03/2020, there is a cavitary mass in the right lower lobe abutting the pleura, measuring 4.6 x 4.4 x 3.5 cm. There is dependent atelectasis in the right lung. There is a rounded opacity in the left lower lobe broadly abutting the pleura. Additional airspace opacities seen in the left lower lobe. There are also areas of atelectasis in the left lingula and left lower lobe. 3 mm right upper lobe pulmonary nodule (series 601, image 60). Upper Abdomen: Limited images of the upper abdomen are unremarkable. 4 mm hypodensity in the liver is too small to characterize but also seen on the comparison abdomen CT (series 2, image 111). Soft Tissues/Bones: No acute bony abnormality. There are old left-sided rib fractures. Approximately 4.6 x 4.4 x 3.5 cm cavitary right lower lobe mass, similar to the recent abdomen and pelvis CT from 12/03/2020. This is new from a CT dated 07/25/2019. This could represent a cavitary pneumonia however a primary lung neoplasm is not excluded. Recommend short-term follow-up CT after treatment to demonstrate interval change.  Rounded opacity in the left lower lobe broadly abutting the pleura could represent rounded atelectasis or infection. Additional airspace opacities in the left lower lobe are suspicious for infection. This area can also be followed up with CT. Small bilateral pleural effusions. 3 mm right upper lobe pulmonary nodule. Follow-up recommendations are below. Suspected enlarged lymph node in the left upper mediastinum posterior to the medial left clavicle, nonspecific. RECOMMENDATIONS: Fleischner Society guidelines for follow-up and management of incidentally detected pulmonary nodules: Nodule size less than 6 mm In a low-risk patient, no routine follow-up. In a high-risk patient, optional CT at 12 months. - Low risk patients include individuals with minimal or absent history of smoking and other known risk factors. - High risk patients include individuals with a history or smoking or known risk factors. Radiology 2017 http://pubs. rsna.org/doi/full/10.1148/radiol. 7437206922     Ct Abdomen Pelvis W Iv Contrast Additional Contrast? None    Result Date: 12/3/2020  EXAMINATION: CT OF THE ABDOMEN AND PELVIS WITH CONTRAST 12/3/2020 2:49 am TECHNIQUE: CT of the abdomen and pelvis was performed with the administration of intravenous contrast. Multiplanar reformatted images are provided for review. Dose modulation, iterative reconstruction, and/or weight based adjustment of the mA/kV was utilized to reduce the radiation dose to as low as reasonably achievable. COMPARISON: August 13, 2019. HISTORY: ORDERING SYSTEM PROVIDED HISTORY: global abdominal pain TECHNOLOGIST PROVIDED HISTORY: global abdominal pain Reason for Exam: Fatigue, abd pain. Due to patient condition, unable to have patient lay flat  or bring arms above head Acuity: Acute Type of Exam: Initial Relevant Medical/Surgical History: History, Gtube/peg tube placement, cholecystectomy FINDINGS: Lower Chest: Partially visualized left lower lobe heterogeneous consolidation.   Trace left pleural fluid with pleural thickening. Right lower lobe 4.2 x 3.3 cm masslike opacity with central necrosis, central air-fluid level and mild surrounding heterogeneous/nodular opacities. Emphysema. Partially visualized cardiac pacer leads. Atherosclerosis of the visualized thoracic aorta. Liver: Few scattered subcentimeter hypodensities in the liver are similar to the prior study and likely represent benign cysts. No new suspicious liver mass. Smooth liver contour. Gallbladder and Bile Ducts: Prior cholecystectomy. Spleen: Normal. Adrenal Glands: Normal. Pancreas: Normal. Genitourinary: Normal. Bowel: The stomach is incompletely distended and not well evaluated. Postsurgical changes of the bowel. Normal caliber bowel. Fluid throughout the colon. Vasculature: Atherosclerosis. No abdominal aortic aneurysm. Patent main portal vein. Bones and Soft Tissues: Osteopenia. Degenerative changes in the visualized spine and pelvis. L3 superior endplate 66-13% height loss was not present on the prior study but appears to be chronic. Retroperitoneum/Mesentery: No intraperitoneal free air, ascites or fluid collection. No lymphadenopathy in the abdomen or pelvis. Fluid throughout the colon suggests enteritis. No bowel obstruction. Postsurgical changes of the bowel. Partially visualized left lower lobe heterogeneous consolidation with trace left basilar pleural fluid and pleural thickening. There is also a cavitary 4.3 cm mass in the right lower lobe which demonstrates a small air-fluid level. Findings may relate to infection. Underlying neoplastic process is not excluded. Short-term follow-up chest CT may be helpful for further evaluation. Kate Marcum Device Plmt/replace/removal    Result Date: 12/8/2020  PROCEDURE: ULTRASOUND GUIDED VASCULAR ACCESS. FLUOROSCOPY GUIDED PICC PLACEMENT 12/8/2020.  HISTORY: ORDERING SYSTEM PROVIDED HISTORY: Long term antibiotic therapy TECHNOLOGIST PROVIDED HISTORY: Long term antibiotic therapy Pneumonia SEDATION: None FLUOROSCOPY DOSE AND TYPE OR TIME AND EXPOSURES: 1 minutes; D  cGy cm2 TECHNIQUE: This procedure was performed by Dr. Francis Peguero. Informed consent was obtained after a detailed explanation of the procedure including risks, benefits, and alternatives. Universal protocol was observed. The right arm was prepped and draped in sterile fashion using maximum sterile barrier technique. Local anesthesia was achieved with lidocaine. A micropuncture needle was used to access the right brachial vein using ultrasound guidance. An ultrasound image demonstrating patency of the vein with needle tip located within it. An image was obtained and stored in PACs. A 0.018 guidewire was used to place a peel-a-way sheath and a 5 Romanian dual-lumen PICC was advanced with fluoroscopic guidance with the tip at the cavo-atrial junction. The catheter flushed easily and there was a good blood return. The catheter was secured to the skin. The patient tolerated the procedure well and there were no immediate complications. EBL: Less than 3 mL FINDINGS: Fluoroscopic image demonstrates the tip of the catheter at the cavo-atrial junction. Successful ultrasound and fluoroscopy guided PICC placement     Xr Chest Portable    Result Date: 12/8/2020  EXAMINATION: ONE XRAY VIEW OF THE CHEST 12/8/2020 9:30 am COMPARISON: 8 December 2020 at 826 hours HISTORY: ORDERING SYSTEM PROVIDED HISTORY: r/o pneumothorax R-side. inconclusive on recent prior exam TECHNOLOGIST PROVIDED HISTORY: r/o pneumothorax R-side. inconclusive on recent prior exam Reason for Exam: This is the second xray taken of this patient in 1 hour. The patient would not cooperate on the previous xray and she did not cooperate on this xray. The patient talked and moved during the whole exam. Acuity: Unknown Type of Exam: Unknown FINDINGS: AP portable view of the chest time stamped at 951 hours demonstrates overlying cardiac monitoring electrodes. Heart size is stable. Bipolar pacemaker enters from the left with intact leads in appropriate positions. There is no change in bibasilar opacities and small bilateral effusions. No sizable extrapleural air. Small linear area of hypodensity is present peripherally in the right upper hemithorax which may be related to trace extrapleural air. No sizable pneumothorax. Linear lucency laterally in the right upper hemithorax suspicious for trace extrapleural air. No change in bibasilar opacities. Xr Chest Portable    Result Date: 12/8/2020  EXAMINATION: ONE XRAY VIEW OF THE CHEST 12/8/2020 8:27 am COMPARISON: Chest radiograph performed 12/07/2020. HISTORY: ORDERING SYSTEM PROVIDED HISTORY: Pneumonia TECHNOLOGIST PROVIDED HISTORY: Pneumonia Reason for Exam: Pt would not cooperate with tech and she would not hold still. Acuity: Chronic Type of Exam: Ongoing FINDINGS: There are bilateral effusions with adjacent bibasilar consolidation. There is a vertical line running parallel to the lateral pleural surface of the right lung. Lung markings appear to extend beyond this towards the pleural surface and it is difficult to discern whether this represents a pneumothorax versus skin fold. The mediastinal structures are stable with stable cardiac leads presumably in the right atrium and right ventricle. The upper abdomen is unremarkable. The extrathoracic soft tissues are unremarkable. Bilateral effusions with adjacent bibasilar consolidation representing atelectasis versus pneumonia. Potential right-sided pneumothorax and short-term follow-up chest radiograph is recommended. Findings were discussed with Dr. Annabella Sarkar at 9:05 am on 12/8/2020.      Xr Chest Portable    Result Date: 12/7/2020  EXAMINATION: ONE XRAY VIEW OF THE CHEST 12/7/2020 12:04 pm COMPARISON: 12/03/2020 HISTORY: ORDERING SYSTEM PROVIDED HISTORY: worsening sob TECHNOLOGIST PROVIDED HISTORY: worsening sob Reason for Exam: PT CO cough with SOB X several days. Best images per pt condition and cooperation. Acuity: Chronic Type of Exam: Ongoing FINDINGS: Transvenous pacer remains in place. Increased pulmonary opacity right base. Increased left pleural effusion. Left basilar opacity. No pneumothorax. Increased bibasilar opacities could represent atelectasis or pneumonia Increased left pleural effusion     Xr Chest Portable    Result Date: 12/3/2020  EXAMINATION: ONE XRAY VIEW OF THE CHEST 12/3/2020 5:17 am COMPARISON: Earlier same day. HISTORY: ORDERING SYSTEM PROVIDED HISTORY: post RIJ central line TECHNOLOGIST PROVIDED HISTORY: post RIJ central line Reason for Exam: Right central line placement. Acuity: Acute Type of Exam: Initial Additional signs and symptoms: Right central line placement. FINDINGS: Interval placement of a right IJ central venous catheter with tip in the mid SVC. No pneumothorax. Redemonstration of low left lung volume and hyperexpanded right lung volume. Similar appearing left greater than right basilar airspace disease with left basilar consolidation and right lateral basilar masslike opacity. Small left pleural effusion. Stable left pectoral trans venous dual-chamber cardiac pacer device. Stable cardiomediastinal silhouette and great vessels. Interval placement of a right IJ central venous catheter with tip in the mid SVC. No pneumothorax. Otherwise, no significant interval change with redemonstration of bilateral basilar airspace disease with left basilar consolidation and right lateral basilar masslike opacity. Continued attention on follow-up recommended. Small left pleural effusion. Xr Chest Portable    Result Date: 12/3/2020  EXAMINATION: ONE XRAY VIEW OF THE CHEST 12/3/2020 2:20 am COMPARISON: May 29, 2019. HISTORY: ORDERING SYSTEM PROVIDED HISTORY: weakness TECHNOLOGIST PROVIDED HISTORY: weakness Reason for Exam: Pt c/o weakness, low blood pressure.  Acuity: Acute Type of Exam: Initial Additional signs and of findings and recommendations. Physical Examination:        Physical Exam  Constitutional:       General: She is not in acute distress. Appearance: Normal appearance. She is normal weight. She is not ill-appearing or toxic-appearing. HENT:      Head: Normocephalic and atraumatic. Cardiovascular:      Rate and Rhythm: Normal rate and regular rhythm. Pulses: Normal pulses. Heart sounds: Normal heart sounds. No murmur. Pulmonary:      Effort: Pulmonary effort is normal. No respiratory distress. Breath sounds: Normal breath sounds. No wheezing. Comments: 2L NC  Abdominal:      General: Abdomen is flat. Bowel sounds are normal.      Palpations: Abdomen is soft. Tenderness: There is no abdominal tenderness. There is no guarding or rebound. Comments: Surgical incision along mid-abdomen   Genitourinary:     Comments: Urinate in external wick. Produced 900ml overnight. Musculoskeletal:      Right lower leg: No edema. Left lower leg: No edema. Skin:     General: Skin is warm and dry. Comments: Sensitive to light touch, causes her pain. Neurological:      Mental Status: She is alert.            Assessment:        Primary Problem  Hospital-acquired pneumonia    Active Hospital Problems    Diagnosis Date Noted    Pacemaker [Z95.0] 12/05/2020    Bacteremia due to Staphylococcus [R78.81, B95.8]     Line sepsis ([facility] [address] [T85.79XA, A41.9] 12/04/2020    Hypotension [I95.9] 12/03/2020    Hospital-acquired pneumonia [J18.9, Y95] 12/03/2020    Chronic obstructive pulmonary disease, unspecified ([facility] [address] [J44.9] 05/24/2019    Elevated C-reactive protein (CRP) [R79.82]     Urinary retention [R33.9]     MRSA colonization [Z22.322]     CVA (cerebral vascular accident) ([facility] [address] [I63.9]        Plan:        Septic shock secondary to colonized indwelling line  -T 99.0, HR 88, RR 18, /52  -WBC 13.6 from 11.7, trending up  -Levophed discontinued  -Blood cultures x2+ for staph capitis and staph hominis sensitive to gentamicin, oxacillin, Bactrim  -Per infectious disease  -continue IV Ancef, day 3 and IV Levaquin, day 6.    -PICC line for IV antibiotics upon discharge.    -5 weeks of IV Ancef for MSSA bacteremia and 3 weeks of oral antibiotics for possible lung abscess.   -Likely switch patient from levofloxacin to Bactrim, will follow infectious disease recommendations  -Catheter to culture growing coagulase-negative staph  -Repeat blood cultures x2 negative for 3 days  -Infectious disease consulted, following recommendations     Cavitary right lower lobe lobe mass; infectious versus malignant etiology  -CT chest without contrast 10/6/2020 displaying 4.6 x 4.4 x 3.5 cm cavitary right lower lobe mass  -Per pulmonology  -Follow-up CT chest in 4 to 6 weeks with possible lung biopsy at that time  -Pulmonology consulted following recommendations  -Appreciate ID recommendations concerning antibiotics     Acute on Chronic diastolic CHF with reduced ejection fraction  -Pro BNP shows 980, lower than past readings  -Echo (4/2019) shows LVEF 45%.  Evidence of diastolic dysfunction  -Echo (12/2020) shows LVEF>55%.  Pacemaker present in the right heart chambers.  Small LV cavity. -CXR (12/7) shows increased bibasilar opacities, could represent atelectasis or pneumonia. Increased left pleural effusion.  -Order with BNP, CXR  -Lasix 20mg given 1 dose 12/7, on daily 20mg IV    SURINDER  -Creatinine 0.73, BUN 6.  Creatinine baseline <0.40  -Not on IV fluids due to fluid overload  -Continue to monitor Creatinine    Sedation secondary to polypharmacy   -Patient arousable but complains of fatigue  -Noted that nightly medications include trazodone, mirtazapine, metoprolol, baclofen, Norco, and Depakote  -Trazodone decreased from 100 mg per night to 50 mg per night yesterday 12/7     COPD  -Continue home medication Symbicort BID daily and DuoNeb PRN     Hypokalemia (Resolved)  -Potassium 2.9 (12/4)  -Potassium replaced, rechecked later in evening at 3.9    Alesia Jaramillo MD  12/9/2020  7:28 AM     Attending Physician Statement  I have discussed the care of Alejo Carvajal with the resident team. I have examined the patient myself and taken ros and hpi , including pertinent history and exam findings,  with the resident. I have reviewed the key elements of all parts of the encounter with the resident. I agree with the assessment, plan and orders as documented by the resident. Principal Problem:    Hospital-acquired pneumonia  Active Problems:    CVA (cerebral vascular accident) ([facility] [address]    MRSA colonization    Urinary retention    Elevated C-reactive protein (CRP)    Chronic obstructive pulmonary disease, unspecified (HCC)    Hypotension    Line sepsis (HCC)    Pacemaker    Bacteremia due to Staphylococcus  Resolved Problems:    * No resolved hospital problems.  *    Still fluid overloaded, another dose of Lasix  BMP stat  Complaining of severe right foot pain-ankle joint appears red, very tender, likely gout, will start steroids and get x-ray      Electronically signed by General Ana Paula MD

## 2020-12-09 NOTE — PROGRESS NOTES
Bedside reporting done, per Gibran Rn's  PICC line intact to right upper arm, O 2  Cont. On per Nasal cannula  Pt. Rests peacefully, with eyes closed, resp. Non labored  Bed  Alarm on  Purewick external catheter intact, cont. To drain clear yellow urine  Waffle care mattress cont. On pt's bed.

## 2020-12-09 NOTE — PROGRESS NOTES
PULMONARY PROGRESS NOTE:    REASON FOR VISIT: pneumonia  Interval History:    Shortness of Breath: no  Cough: no  Sputum: no          Hemoptysis: no  Chest Pain: no  Fever: no                   Swelling Feet: no  Headache: no                                           Nausea, Emesis, Abdominal Pain: no  Diarrhea: no         Constipation: no  C/o rt foot pain - non ambulatory at baseline    Events since last visit: none    PAST MEDICAL HISTORY:      Scheduled Meds:   predniSONE  20 mg Oral Daily    furosemide  20 mg Intravenous Daily    traZODone  50 mg Oral Nightly    ceFAZolin  2 g Intravenous Q8H    [Held by provider] metoprolol tartrate  50 mg Oral BID    levofloxacin  750 mg Intravenous Q24H    sodium chloride  500 mL Intravenous Once    budesonide-formoterol  2 puff Inhalation BID    divalproex  125 mg Oral BID    sertraline  50 mg Oral Daily    mirtazapine  7.5 mg Oral Nightly    levothyroxine  125 mcg Oral Daily    baclofen  10 mg Oral BID    sodium chloride flush  10 mL Intravenous 2 times per day    enoxaparin  40 mg Subcutaneous Daily     Continuous Infusions:    PRN Meds:HYDROcodone 5 mg - acetaminophen, perflutren lipid microspheres, sodium chloride flush, ipratropium-albuterol, sodium chloride flush, potassium chloride **OR** potassium alternative oral replacement **OR** potassium chloride, magnesium sulfate, acetaminophen **OR** acetaminophen, polyethylene glycol, promethazine **OR** ondansetron        PHYSICAL EXAMINATION:  /60   Pulse 75   Temp 97 °F (36.1 °C) (Axillary)   Resp 18   Ht 5' (1.524 m)   Wt 140 lb 14.4 oz (63.9 kg)   SpO2 97%   BMI 27.52 kg/m²   afebrile  General : Awake, alert, non labored respirations  Neck - supple, no lymphadenopathy, JVD not raised  Heart - regular rhythm, S1 and S2 normal; no additional sounds heard  Lungs - Air Entry- fair bilaterally; breath sounds : vesicular, 97% on 2 l nc  Abdomen - soft, no tenderness  Upper Extremities  - no cyanosis, mottling, LUE lymphedema and contracted  Lower Extremities: no cyanosis, mottling; + edema / tight shiny skin    Current Laboratory, Radiologic, Microbiologic, and Diagnostic studies reviewed  Data ReviewCBC:   Recent Labs     12/07/20  0454 12/08/20  0632   WBC 13.0* 13.6*   RBC 2.78* 2.66*   HGB 8.3* 7.9*   HCT 25.4* 24.2*    299     BMP:   Recent Labs     12/07/20  0454 12/08/20  0632 12/09/20  1045   GLUCOSE 78 85 89    136 137   K 4.2 4.0 3.6*   BUN 5* 6* 8   CREATININE 0.48* 0.73 0.89   CALCIUM 8.0* 8.0* 8.1*     ABGs:   Recent Labs     12/07/20  1118   PHART 7.352   PO2ART 26.9*   DOJ1BGW 37.4   WJS1ION 20.7*   G5GGNFZG 53.8*      PT/INR:  No results found for: PTINR    ASSESSMENT / PLAN:    Pneumonia - ABx  OK for DC with ABx  F/u CT chest 4-6 weeks; may need needle biopsy  CXR ?  PTX; repeat CXR- no PTX  Stable Pulm wise    Plan of care discussed with Dr Vanita Gutierrez  Electronically signed by Denise Wallace on 12/09/20 at 12:12 PM.

## 2020-12-10 NOTE — PROGRESS NOTES
[address]    PROGRESS NOTE             12/10/2020    8:58 AM    Name:   Odette Bhandari  MRN:     [id number]     Acct:      [de-identified]   Room:   2098/2098-01   Day:  7  Admit Date:  12/3/2020  1:20 AM    PCP:  Flora Sibley MD  Code Status:  Full Code    Subjective:     C/C:   Chief Complaint   Patient presents with    Fatigue     Interval History Status:     Patient was seen and evaluated by bedside. Patient blood pressure dipped into the 80s/60s yesterday evening. Currently her BP is 106/89 with heart rate 103. Per RN report, patient has been producing copious amount of phlegm this morning. Patient refused breakfast this morning. Patient continues to complains of pain all over her body, unable to specify particular location. She is currently on Lasix 20 mg IV. IV and Ancef day 4 and IV Levaquin day 7. Prednisone 20 mg tablet day 2. X-ray of right ankle and foot shows no fractures and multipara osteopenia. Hemoglobin 7.6 today, trending downward from 9.5 upon admission. Iron study and stool occult ordered. Brief History:     Please review H&P    Review of Systems:     Review of Systems   Constitutional: Negative for activity change, appetite change, chills and fever. On dental soft food and nectar thick diet. Complains of pain all over and with light touch. HENT: Negative for congestion. Respiratory: Negative for chest tightness, shortness of breath and wheezing. Cardiovascular: Negative for chest pain, palpitations and leg swelling. Gastrointestinal: Negative for abdominal pain, constipation, diarrhea and nausea. Genitourinary: Negative for difficulty urinating and dysuria. Self reports she does not produce much urine   Musculoskeletal: Negative for back pain and joint swelling. Skin: Negative for rash. Neurological: Negative for dizziness, weakness and headaches.    Psychiatric/Behavioral: Negative for agitation and behavioral problems. Medications: Allergies: Allergies   Allergen Reactions    Penicillins Shortness Of Breath and Rash    Amoxicillin        Current Meds:   Scheduled Meds:    potassium chloride  40 mEq Oral BID WC    predniSONE  20 mg Oral Daily    furosemide  20 mg Intravenous Daily    traZODone  50 mg Oral Nightly    ceFAZolin  2 g Intravenous Q8H    [Held by provider] metoprolol tartrate  50 mg Oral BID    levofloxacin  750 mg Intravenous Q24H    sodium chloride  500 mL Intravenous Once    budesonide-formoterol  2 puff Inhalation BID    divalproex  125 mg Oral BID    sertraline  50 mg Oral Daily    mirtazapine  7.5 mg Oral Nightly    levothyroxine  125 mcg Oral Daily    baclofen  10 mg Oral BID    sodium chloride flush  10 mL Intravenous 2 times per day    enoxaparin  40 mg Subcutaneous Daily     Continuous Infusions:   PRN Meds: HYDROcodone 5 mg - acetaminophen, perflutren lipid microspheres, sodium chloride flush, ipratropium-albuterol, sodium chloride flush, potassium chloride **OR** potassium alternative oral replacement **OR** potassium chloride, magnesium sulfate, acetaminophen **OR** acetaminophen, polyethylene glycol, promethazine **OR** ondansetron    Data:     Past Medical History:   has a past medical history of Abnormal computed tomography of cervical spine, CVA (cerebral vascular accident) ([address] GERD (gastroesophageal reflux disease), Hypertension, Paraproteinemia, and Weight loss. Social History:   reports that she has quit smoking. She has a 30.00 pack-year smoking history. She has never used smokeless tobacco. She reports previous alcohol use of about 28.0 standard drinks of alcohol per week. She reports that she does not use drugs. Family History: History reviewed. No pertinent family history.     Vitals:  /89   Pulse 103   Temp 98.8 °F (37.1 °C) (Oral)   Resp 16   Ht 5' (1.524 m)   Wt 139 lb 14.4 oz (63.5 kg)   SpO2 93%   BMI 27.32 kg/m²   Temp (24hrs), Av.9 °F (36.6 °C), Min:97.2 °F (36.2 °C), Max:98.8 °F (37.1 °C)    No results for input(s): POCGLU in the last 72 hours. I/O(24Hr): Intake/Output Summary (Last 24 hours) at 12/10/2020 0858  Last data filed at 12/10/2020 0553  Gross per 24 hour   Intake 275 ml   Output 2245 ml   Net -1970 ml       Labs:    CBC with Differential:    Lab Results   Component Value Date    WBC 13.7 2020    RBC 2.77 2020    RBC 3.66 2012    HGB 7.6 2020    HCT 24.6 2020     2020     2012    MCV 89.0 2020    MCH 27.5 2020    MCHC 30.9 2020    RDW 14.9 2020    METASPCT 1 2020    LYMPHOPCT 3 2020    MONOPCT 8 2020    MYELOPCT 6 2020    BASOPCT 0 2020    MONOSABS 1.10 2020    LYMPHSABS 0.41 2020    EOSABS 0.14 2020    BASOSABS 0.00 2020    DIFFTYPE NOT REPORTED 2020     BMP:    Lab Results   Component Value Date     2020    K 3.6 2020     2020    CO2 21 2020    BUN 8 2020    LABALBU 2.3 2020    LABALBU 4.6 2012    CREATININE 0.89 2020    CALCIUM 8.1 2020    GFRAA >60 2020    LABGLOM >60 2020    GLUCOSE 89 2020    GLUCOSE 87 2012       Lab Results   Component Value Date/Time    SPECIAL RIGHT AC10 ML 2020 12:54 PM    SPECIAL RIGHT WRIST 3ML 2020 12:54 PM     Lab Results   Component Value Date/Time    CULTURE NO GROWTH 5 DAYS 2020 12:54 PM    CULTURE NO GROWTH 5 DAYS 2020 12:54 PM         Radiology:    Xr Ankle Right (2 Views)    Result Date: 2020  EXAMINATION: 3 XRAY VIEWS OF THE RIGHT FOOT; 3 XRAY VIEWS OF THE RIGHT ANKLE 2020 1:30 pm COMPARISON: None. HISTORY: ORDERING SYSTEM PROVIDED HISTORY: foot pain TECHNOLOGIST PROVIDED HISTORY: foot pain Reason for Exam: foot pain.  best images per pt condition Acuity: Acute Type of Exam: Initial; ORDERING SYSTEM PROVIDED HISTORY: foot pain TECHNOLOGIST PROVIDED HISTORY: foot pain Reason for Exam: foot pain. best images per pt condition FINDINGS: Right foot: Mottled osteopenia. Anatomic alignment. No acute fracture. The joint spaces appear normal. Left ankle: Anatomic alignment. No fracture. Mottled osteopenia. 1. No acute bony or joint abnormality 2. Mottled osteopenia     Xr Foot Right (2 Views)    Result Date: 12/9/2020  EXAMINATION: 3 XRAY VIEWS OF THE RIGHT FOOT; 3 XRAY VIEWS OF THE RIGHT ANKLE 12/9/2020 1:30 pm COMPARISON: None. HISTORY: ORDERING SYSTEM PROVIDED HISTORY: foot pain TECHNOLOGIST PROVIDED HISTORY: foot pain Reason for Exam: foot pain. best images per pt condition Acuity: Acute Type of Exam: Initial; ORDERING SYSTEM PROVIDED HISTORY: foot pain TECHNOLOGIST PROVIDED HISTORY: foot pain Reason for Exam: foot pain. best images per pt condition FINDINGS: Right foot: Mottled osteopenia. Anatomic alignment. No acute fracture. The joint spaces appear normal. Left ankle: Anatomic alignment. No fracture. Mottled osteopenia. 1. No acute bony or joint abnormality 2. Mottled osteopenia     Ct Chest Wo Contrast    Result Date: 12/6/2020  EXAMINATION: CT OF THE CHEST WITHOUT CONTRAST 12/4/2020 3:08 pm TECHNIQUE: CT of the chest was performed without the administration of intravenous contrast. Multiplanar reformatted images are provided for review. Dose modulation, iterative reconstruction, and/or weight based adjustment of the mA/kV was utilized to reduce the radiation dose to as low as reasonably achievable. COMPARISON: Chest radiograph 12/03/2020, CT abdomen and pelvis 12/03/2020. HISTORY: ORDERING SYSTEM PROVIDED HISTORY: pneumonia TECHNOLOGIST PROVIDED HISTORY: pneumonia Reason for Exam: f/u pneumonia Acuity: Unknown Type of Exam: Unknown FINDINGS: Mediastinum: Suspected 17 mm lymph node posterior to the left medial clavicle appears to have increased in size from a 2012 chest CT.   No other definite lymphadenopathy. No pericardial effusion. Coronary artery calcifications are seen. The thoracic aorta is nonaneurysmal.  The main pulmonary artery is not significantly dilated. Right IJ CVC catheter tip is in the SVC. There is a left chest wall cardiac device with multiple leads. Lungs/pleura: The central airways are patent. Small bilateral pleural effusions. No pneumothorax is seen. Emphysematous changes are seen in the lungs. Similar appearance to the CT abdomen and pelvis from 12/03/2020, there is a cavitary mass in the right lower lobe abutting the pleura, measuring 4.6 x 4.4 x 3.5 cm. There is dependent atelectasis in the right lung. There is a rounded opacity in the left lower lobe broadly abutting the pleura. Additional airspace opacities seen in the left lower lobe. There are also areas of atelectasis in the left lingula and left lower lobe. 3 mm right upper lobe pulmonary nodule (series 601, image 60). Upper Abdomen: Limited images of the upper abdomen are unremarkable. 4 mm hypodensity in the liver is too small to characterize but also seen on the comparison abdomen CT (series 2, image 111). Soft Tissues/Bones: No acute bony abnormality. There are old left-sided rib fractures. Approximately 4.6 x 4.4 x 3.5 cm cavitary right lower lobe mass, similar to the recent abdomen and pelvis CT from 12/03/2020. This is new from a CT dated 07/25/2019. This could represent a cavitary pneumonia however a primary lung neoplasm is not excluded. Recommend short-term follow-up CT after treatment to demonstrate interval change. Rounded opacity in the left lower lobe broadly abutting the pleura could represent rounded atelectasis or infection. Additional airspace opacities in the left lower lobe are suspicious for infection. This area can also be followed up with CT. Small bilateral pleural effusions. 3 mm right upper lobe pulmonary nodule. Follow-up recommendations are below. the liver are similar to the prior study and likely represent benign cysts. No new suspicious liver mass. Smooth liver contour. Gallbladder and Bile Ducts: Prior cholecystectomy. Spleen: Normal. Adrenal Glands: Normal. Pancreas: Normal. Genitourinary: Normal. Bowel: The stomach is incompletely distended and not well evaluated. Postsurgical changes of the bowel. Normal caliber bowel. Fluid throughout the colon. Vasculature: Atherosclerosis. No abdominal aortic aneurysm. Patent main portal vein. Bones and Soft Tissues: Osteopenia. Degenerative changes in the visualized spine and pelvis. L3 superior endplate 26-54% height loss was not present on the prior study but appears to be chronic. Retroperitoneum/Mesentery: No intraperitoneal free air, ascites or fluid collection. No lymphadenopathy in the abdomen or pelvis. Fluid throughout the colon suggests enteritis. No bowel obstruction. Postsurgical changes of the bowel. Partially visualized left lower lobe heterogeneous consolidation with trace left basilar pleural fluid and pleural thickening. There is also a cavitary 4.3 cm mass in the right lower lobe which demonstrates a small air-fluid level. Findings may relate to infection. Underlying neoplastic process is not excluded. Short-term follow-up chest CT may be helpful for further evaluation. Ir Sal Hesterg Device Plmt/replace/removal    Result Date: 12/8/2020  PROCEDURE: ULTRASOUND GUIDED VASCULAR ACCESS. FLUOROSCOPY GUIDED PICC PLACEMENT 12/8/2020. HISTORY: ORDERING SYSTEM PROVIDED HISTORY: Long term antibiotic therapy TECHNOLOGIST PROVIDED HISTORY: Long term antibiotic therapy Pneumonia SEDATION: None FLUOROSCOPY DOSE AND TYPE OR TIME AND EXPOSURES: 1 minutes; D  cGy cm2 TECHNIQUE: This procedure was performed by Dr. Khai Mcduffie. Informed consent was obtained after a detailed explanation of the procedure including risks, benefits, and alternatives. Universal protocol was observed.  The right arm was prepped and draped in sterile fashion using maximum sterile barrier technique. Local anesthesia was achieved with lidocaine. A micropuncture needle was used to access the right brachial vein using ultrasound guidance. An ultrasound image demonstrating patency of the vein with needle tip located within it. An image was obtained and stored in PACs. A 0.018 guidewire was used to place a peel-a-way sheath and a 5 [place] citizen dual-lumen PICC was advanced with fluoroscopic guidance with the tip at the cavo-atrial junction. The catheter flushed easily and there was a good blood return. The catheter was secured to the skin. The patient tolerated the procedure well and there were no immediate complications. EBL: Less than 3 mL FINDINGS: Fluoroscopic image demonstrates the tip of the catheter at the cavo-atrial junction. Successful ultrasound and fluoroscopy guided PICC placement     Xr Chest Portable    Result Date: 12/8/2020  EXAMINATION: ONE XRAY VIEW OF THE CHEST 12/8/2020 9:30 am COMPARISON: 8 December 2020 at 826 hours HISTORY: ORDERING SYSTEM PROVIDED HISTORY: r/o pneumothorax R-side. inconclusive on recent prior exam TECHNOLOGIST PROVIDED HISTORY: r/o pneumothorax R-side. inconclusive on recent prior exam Reason for Exam: This is the second xray taken of this patient in 1 hour. The patient would not cooperate on the previous xray and she did not cooperate on this xray. The patient talked and moved during the whole exam. Acuity: Unknown Type of Exam: Unknown FINDINGS: AP portable view of the chest time stamped at 951 hours demonstrates overlying cardiac monitoring electrodes. Heart size is stable. Bipolar pacemaker enters from the left with intact leads in appropriate positions. There is no change in bibasilar opacities and small bilateral effusions. No sizable extrapleural air.   Small linear area of hypodensity is present peripherally in the right upper hemithorax which may be related to trace pneumonia Increased left pleural effusion     Xr Chest Portable    Result Date: 12/3/2020  EXAMINATION: ONE XRAY VIEW OF THE CHEST 12/3/2020 5:17 am COMPARISON: Earlier same day. HISTORY: ORDERING SYSTEM PROVIDED HISTORY: post RIJ central line TECHNOLOGIST PROVIDED HISTORY: post RIJ central line Reason for Exam: Right central line placement. Acuity: Acute Type of Exam: Initial Additional signs and symptoms: Right central line placement. FINDINGS: Interval placement of a right IJ central venous catheter with tip in the mid SVC. No pneumothorax. Redemonstration of low left lung volume and hyperexpanded right lung volume. Similar appearing left greater than right basilar airspace disease with left basilar consolidation and right lateral basilar masslike opacity. Small left pleural effusion. Stable left pectoral trans venous dual-chamber cardiac pacer device. Stable cardiomediastinal silhouette and great vessels. Interval placement of a right IJ central venous catheter with tip in the mid SVC. No pneumothorax. Otherwise, no significant interval change with redemonstration of bilateral basilar airspace disease with left basilar consolidation and right lateral basilar masslike opacity. Continued attention on follow-up recommended. Small left pleural effusion. Xr Chest Portable    Result Date: 12/3/2020  EXAMINATION: ONE XRAY VIEW OF THE CHEST 12/3/2020 2:20 am COMPARISON: May 29, 2019. HISTORY: ORDERING SYSTEM PROVIDED HISTORY: weakness TECHNOLOGIST PROVIDED HISTORY: weakness Reason for Exam: Pt c/o weakness, low blood pressure. Acuity: Acute Type of Exam: Initial Additional signs and symptoms: Pt c/o weakness, low blood pressure. FINDINGS: Frontal portable view of the chest.  Low left lung volume. Hyperexpanded right lung volume. Leftward shift of the mediastinal structures. Left greater than right basilar heterogeneous opacities. Small bilateral pleural effusions versus pleural thickening.   No pneumothorax. Atherosclerotic thoracic aorta. No significant cardiomegaly. Left pectoral trans venous dual-chamber cardiac pacer device. Osteopenia. Multilevel degenerative disc disease. Left greater than right basilar heterogeneous opacities. Differential considerations include atelectasis/scarring, asymmetric pulmonary edema and an infectious/inflammatory process. Follow-up PA and lateral chest radiographs or chest CT may be helpful for further evaluation as warranted. Small bilateral pleural effusions versus pleural thickening. Low left lung volume with leftward shift of the mediastinal structures. Hyperexpanded right lung volume. Fl Modified Barium Swallow W Video    Result Date: 12/7/2020  EXAMINATION: MODIFIED BARIUM SWALLOW WAS PERFORMED IN CONJUNCTION WITH SPEECH PATHOLOGY [facility] TECHNIQUE: Fluoroscopic evaluation of the swallowing mechanism was performed with multiple consistency of barium product. FLUOROSCOPY DOSE AND TYPE OR TIME AND EXPOSURES: Fluoro time 2:08 min DAP 86.2 dGy cm2 AIR KERMA 16.6 mGy COMPARISON: None HISTORY: ORDERING SYSTEM PROVIDED HISTORY: r/o aspiration TECHNOLOGIST PROVIDED HISTORY: r/o aspiration Reason for Exam: r/o aspiration Acuity: Acute Type of Exam: Initial FINDINGS: Oral phase of swallowing was grossly within normal limits. Deep penetration noted with thin liquid consistency. Premature vallecular spillage with all consistencies. Prolonged mastication of regular solid consistency due to sparse dentition. No evidence of aspiration. Deep penetration with thin liquid consistency. Please see separate speech pathology report for full discussion of findings and recommendations. Physical Examination:        Physical Exam  Constitutional:       General: She is not in acute distress. Appearance: Normal appearance. She is normal weight. She is not ill-appearing or toxic-appearing. HENT:      Head: Normocephalic and atraumatic.    Cardiovascular: staph  -Repeat blood cultures x2 negative for 3 days  -Infectious disease consulted, following recommendations     Cavitary right lower lobe lobe mass; infectious versus malignant etiology  -CT chest without contrast 10/6/2020 displaying 4.6 x 4.4 x 3.5 cm cavitary right lower lobe mass  -Per pulmonology  -Follow-up CT chest in 4 to 6 weeks with possible lung biopsy at that time  -Pulmonology consulted following recommendations  -Appreciate ID recommendations concerning antibiotics     Acute on Chronic diastolic CHF with reduced ejection fraction  -Pro BNP shows 980, lower than past readings  -Echo (4/2019) shows LVEF 45%.  Evidence of diastolic dysfunction  -Echo (12/2020) shows LVEF>55%.  Pacemaker present in the right heart chambers.  Small LV cavity. -CXR (12/7) shows increased bibasilar opacities, could represent atelectasis or pneumonia.  Increased left pleural effusion.  -Order with BNP, CXR  -Lasix 20mg given 1 dose 12/7, on daily 20mg IV     SURINDER  -Creatinine 0.89, BUN 6. Creatinine baseline <0.40  -Not on IV fluids due to fluid overload  -Continue to monitor Creatinine    Possible GI bleed vs Anemia  Hemoglobin 7.6, downward trend from 9.5  Order iron studies  Order stool occult    Gout  XR of right ankle and foot shows no acute bony or joint abnormalities, mild to osteopenia present.   Prednisone 20 mg daily for 7 doses, on second dose today.     Sedation secondary to polypharmacy (improving)  -Patient arousable but complains of fatigue  -Noted that nightly medications include trazodone, mirtazapine, metoprolol, baclofen, Norco, and Depakote  -Trazodone decreased from 100 mg per night to 50 mg per night yesterday 12/7     COPD  -Continue home medication Symbicort BID daily and DuoNeb PRN     Hypokalemia (Resolved)  -Potassium 2.9 (12/4)  -Potassium replaced, rechecked later in evening at 3.9    Linda Krishnan MD  12/10/2020  8:58 AM     Attending Physician Statement  I have discussed the care of Greg Rossi Munir with the resident team. I have examined the patient myself and taken ros and hpi , including pertinent history and exam findings,  with the resident. I have reviewed the key elements of all parts of the encounter with the resident. I agree with the assessment, plan and orders as documented by the resident. Principal Problem:    Hospital-acquired pneumonia  Active Problems:    CVA (cerebral vascular accident) ([facility] [address]    MRSA colonization    Urinary retention    Elevated C-reactive protein (CRP)    Chronic obstructive pulmonary disease, unspecified (HCC)    Hypotension    Line sepsis ([facility])    Pacemaker    Bacteremia due to Staphylococcus  Resolved Problems:    * No resolved hospital problems.  *    Oxygen requirement slightly better, patient subjectively feeling better continuing with 20 mg daily IV Lasix, creatinine improving, right foot x-ray unremarkable,  Has been coughing more, will get chest x-ray  Will give 1 extra dose of Lasix today-difficulty diuresing due to blood pressure being tenuous    Electronically signed by Nick King MD

## 2020-12-10 NOTE — PROGRESS NOTES
Speech Language Pathology  Speech Language Pathology  [facility]    Dysphagia Treatment Note    Date: 12/10/2020  Patients Name: Sarah Kraus  MRN: [id number]  Diagnosis: dysphagia  Patient Active Problem List   Diagnosis Code    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    CVA (cerebral vascular accident) ([facility] [address] I63.9    Abnormal computed tomography of cervical spine R93.7    Weight loss R63.4    Functional gait abnormality R26.89    Left knee pain M25.562    Knee pain, left M25.562    Acute pain of left knee M25.562    Sepsis due to urinary tract infection (HCC) A41.9, N39.0    Cystitis N30.90    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    Septic shock (HCC) A41.9, R65.21    Leukemoid reaction D72.823    Aspiration pneumonia of right lower lobe due to vomit (HCC) J69.0    MRSA colonization Z22.322    Urinary retention R33.9    Abdominal distention R14.0    Elevated CEA R97.0    Elevated CA 19-9 level R97.8    Cholecystitis K81.9    Elevated C-reactive protein (CRP) R79.82    Elevated procalcitonin R79.89    Bandemia D72.825    Centrilobular emphysema (HCC) J43.2    Hemorrhage of rectum and anus K62.5    GI bleed K92.2    Anemia D64.9    Small bowel obstruction (HCC) K56.609    Anxiety disorder F41.9    Noncompliance Z91.19    Acute respiratory failure (HCC) J96.00    Chronic obstructive pulmonary disease, unspecified (HCC) J44.9    Bacteremia due to coagulase-negative Staphylococcus R78.81, B95.7    Bradycardia R00.1    Fever R50.9    Abdominal wall abscess at site of surgical wound T81.49XA    Hypotension I95.9    Hospital-acquired pneumonia J18.9, Y95    Line sepsis (HCC) T85.79XA, A41.9    Pacemaker Z95.0    Bacteremia due to Staphylococcus R78.81, B95.8       Pain: 10/10 (\"everywhere\")    Dysphagia Treatment  Treatment time:   4177-8084  Subjective: [] Alert [] Cooperative     [] Confused     [] Agitated      [] Lethargic    Objective/Assessment:    Pt seen for diet tolerance monitoring, agreeable to trials of liquid at bedside. Therapeutic feeding trials of mildly thick liquid x 6 via straw presented with no overt s/s of aspiration 100% of the time. Attempted swallowing exercise program this PM.  Pt. completed BOT exercises x2 sets in reps of 3-7 then stating, \"That's enough, that hurts\" and \"This is making my tongue numb\". Pt. Able to complete 5 volitional effortful swallows then stating, \"That's enough, that hurts\". Pt ed provided re importance of swallowing exercise program to improve swallow function. Pt. Verbalized understanding stating, \"will try again when I'm not hurting this much\". ST will continue to monitor. Plan:  [x] Continue ST services    [] Discharge from ST:        Discharge recommendations: [] Inpatient Rehab   [x] [facility]   [] Outpatient Therapy  [] Follow up at trauma clinic   [] Other:         Treatment completed by:  Khoa Mast M.A., CCC-SLP

## 2020-12-10 NOTE — PROGRESS NOTES
[facility] -   Infectious diseases evaluation  admission date 12/3/2020    reason for consultation:   Healthcare acquired pneumonia  Impression :   Current:  · Healthcare acquired pneumonia with cavitary right lower lobe mass/positive mycoplasma IgM. ·  STAPHYLOCOCCUS HOMINIS oxacillin sensitive bacteremia versus contamination could be related to peripheral lines that was removed. · Pacemaker in place  · Possible gout  · Positive MRSA swab  · Possible pneumothorax  · Hypertension  · Urinary retention  · Chronic CHF  · History of ESBL and MRSA infection    Other:  · Penicillin allergy    Recommendations   · Echocardiogram showed no obvious vegetation  · IV vancomycin discontinued 12/7/2020  · Continue IV Ancef   · Discontinue IV Levaquin day 7 and start doxycycline  · Follow chest x-ray from today  · PICC line placed 12/8/2020  ·  IV Ancef 2 g IV every 8 hours for MSSE bacteremia in the presence of pacemaker until 1/16/2020 and oral doxycycline 100 mg twice a day until 12/30/2020  · CT chest planned in 4 to 6 weeks by pulmonary with consideration for lung biopsy if no improvement  · Follow blood cultures from 12/5/2020 no growth to date  · Follow CBC renal function  · Continue supportive care  · Discussed with nursing staff          History of Present Illness:   Initial history:  Madeleine Mixon is a 65y.o.-year-old female with history of CVA was brought to the ER from her nursing home for low blood pressure  for 1 day associated with generalized weakness. The patient had poor IV access and was not able to get IV fluids at the nursing home. The patient was placed on Levophed initially that was weaned off  12/2/2020 WBC 12.6, creatinine normal,  980, lactic acid 1.3, COVID-19 rapid test was negative.   Blood cultures 12/3/2012 2 grew gram-positive cocci in cluster, procalcitonin 0.08, mycoplasma IgM 1.22, C-reactive protein 156, nasal swab for MRSA was positive, respiratory molecular panel with Covid PCR was negative  Chest x-ray showed the left greater than right basilar opacities  CT abdomen pelvis suggestive of enteritis,  lower lobe consolidation, cavitary 4.3 cm mass in the right lower lobe with a small air-fluid level. Failed swallow study, diet was changed  Interval changes  12/10/2020   She had episode of hypotension last evening with reported systolic blood pressure 13X to 80s, blood pressure stable this morning. She is complaining of pain all over  The patient was started on prednisone yesterday for suspected gout, right ankle x-ray showed no fractures. Reported large amount of phlegm this morning      Patient Vitals for the past 8 hrs:   BP Temp Temp src Pulse Resp SpO2   12/10/20 1430 95/62 98.4 °F (36.9 °C) Axillary 91 20 93 %   12/10/20 0807 -- -- -- -- -- 93 %   12/10/20 0801 106/89 98.8 °F (37.1 °C) Oral 103 16 99 %           I have personally reviewed the past medical history, past surgical history, medications, social history, and family history, and I haveupdated the database accordingly. Allergies:   Penicillins and Amoxicillin     Review of Systems:     Review of Systems  Decreased appetite, mild cough and shortness of breath, other than above 12 systems reviewed were negative  Physical Examination :       Physical Exam  HENT:      Head: Normocephalic and atraumatic. Right Ear: External ear normal.      Left Ear: External ear normal.      Mouth/Throat:      Pharynx: Oropharynx is clear. No posterior oropharyngeal erythema. Eyes:      General: No scleral icterus. Conjunctiva/sclera: Conjunctivae normal.   Neck:      Musculoskeletal: Neck supple. No neck rigidity. Cardiovascular:      Rate and Rhythm: Normal rate and regular rhythm. Heart sounds: No murmur. Pulmonary:      Effort: Pulmonary effort is normal.      Breath sounds: Normal breath sounds. No wheezing. Abdominal:      Palpations: Abdomen is soft.       Tenderness: There is no abdominal tenderness. Genitourinary:     Comments: External urinary catheter in place  Musculoskeletal:      Right lower leg: No edema. Left lower leg: No edema. Comments: Right ankle mild warmth. Skin:     General: Skin is warm. Coloration: Skin is not jaundiced. Neurological:      Mental Status: She is alert. Comments: Oriented to person          Past Medical History:     Past Medical History:   Diagnosis Date    Abnormal computed tomography of cervical spine     sclerotic bone appearance     CVA (cerebral vascular accident) ([address]     left  side weakness    GERD (gastroesophageal reflux disease)     Hypertension     Paraproteinemia     Weight loss        Past Surgical  History:     Past Surgical History:   Procedure Laterality Date    ABDOMEN SURGERY N/A 5/25/2019    ABDOMEN DEBRIDEMENT WOUND CLOSURE REOPENING OF RECENT LAPOROATOMY, ABDOMINAL WASHOUT, REPAIR FASCIAL DEHISENCE WITH MESH performed by Florina Brown DO at [facility] 5/15/2019    CHOLECYSTECTOMY performed by Florina Brown DO at [address] [facility] 5/25/2019    GASTROSTOMY TUBE PLACEMENT performed by Florina Brown DO at [facility] [address] [place] [address] N/A 7/27/2019    EGD ESOPHAGOGASTRODUODENOSCOPY WITH REMOVAL OF PEG TUBE performed by Florina Brown DO at [address] [place] [address]  4/14/2019         LAPAROSCOPY N/A 5/15/2019    LAPAROSCOPY EXPLORATORY CONVERTED TO EXPLORATORY LAPAROTOMY/ RIGHT COLON RESECTION AND ANASTAMOSIS/ OPEN CHOLECYSTECTOMY/ EXTENSIVE LYSIS OF ADHESIONS performed by Florina Brown DO at [facility] 10  07/2011    Pacemaker is Medtronic Revo (compatible). Leads placed in 1995 are NOT MRI compatible. Placed at [facility]. V's per Dr. Helio Webber can not have an MRI.     UPPER GASTROINTESTINAL ENDOSCOPY N/A 4/16/2019    EGD ESOPHAGOGASTRODUODENOSCOPY @ BEDSIDE  ICU 2002 performed by Alison Wallace MD at [address] Medications:      potassium chloride  40 mEq Oral BID WC    ferrous sulfate  325 mg Oral BID WC    predniSONE  20 mg Oral Daily    furosemide  20 mg Intravenous Daily    traZODone  50 mg Oral Nightly    ceFAZolin  2 g Intravenous Q8H    [Held by provider] metoprolol tartrate  50 mg Oral BID    levofloxacin  750 mg Intravenous Q24H    sodium chloride  500 mL Intravenous Once    budesonide-formoterol  2 puff Inhalation BID    divalproex  125 mg Oral BID    sertraline  50 mg Oral Daily    mirtazapine  7.5 mg Oral Nightly    levothyroxine  125 mcg Oral Daily    baclofen  10 mg Oral BID    sodium chloride flush  10 mL Intravenous 2 times per day    enoxaparin  40 mg Subcutaneous Daily       Social History:     Social History     Socioeconomic History    Marital status:       Spouse name: Not on file    Number of children: Not on file    Years of education: Not on file    Highest education level: Not on file   Occupational History    Not on file   Social Needs    Financial resource strain: Not on file    Food insecurity     Worry: Not on file     Inability: Not on file    Transportation needs     Medical: Not on file     Non-medical: Not on file   Tobacco Use    Smoking status: Former Smoker     Packs/day: 1.00     Years: 30.00     Pack years: 30.00    Smokeless tobacco: Never Used   Substance and Sexual Activity    Alcohol use: Not Currently     Alcohol/week: 28.0 standard drinks     Types: 28 Glasses of wine per week    Drug use: No    Sexual activity: Not on file   Lifestyle    Physical activity     Days per week: Not on file     Minutes per session: Not on file    Stress: Not on file   Relationships    Social connections     Talks on phone: Not on file     Gets together: Not on file     Attends Quaker service: Not on file     Active member of club or organization: Not on file     Attends meetings of clubs or organizations: Not on file     Relationship status: Not on file  Intimate partner violence     Fear of current or ex partner: Not on file     Emotionally abused: Not on file     Physically abused: Not on file     Forced sexual activity: Not on file   Other Topics Concern    Not on file   Social History Narrative    Not on file       Family History:   History reviewed. No pertinent family history. Medical Decision Making:   I have independently reviewed/ordered the following labs:    CBC with Differential:   Recent Labs     12/09/20  1513 12/10/20  0811   WBC 13.7* 18.1*   HGB 7.6* 8.4*   HCT 24.6* 25.0*    357   LYMPHOPCT 3* 15*   MONOPCT 8* 12*     BMP:  Recent Labs     12/09/20  1045 12/10/20  0811    135   K 3.6* 3.2*    105   CO2 21 22   BUN 8 13   CREATININE 0.89 0.82   MG  --  1.8     Hepatic Function Panel:   No results for input(s): PROT, LABALBU, BILIDIR, IBILI, BILITOT, ALKPHOS, ALT, AST in the last 72 hours. No results for input(s): RPR in the last 72 hours. No results for input(s): HIV in the last 72 hours. No results for input(s): BC in the last 72 hours. Lab Results   Component Value Date    CREATININE 0.82 12/10/2020    GLUCOSE 81 12/10/2020    GLUCOSE 87 05/21/2012       Detailed results:    Ct Chest Wo Contrast     Result Date: 12/6/2020  EXAMINATION: CT OF THE CHEST WITHOUT CONTRAST 12/4/2020 3:08 pm TECHNIQUE: CT of the chest was performed without the administration of intravenous contrast. Multiplanar reformatted images are provided for review. Dose modulation, iterative reconstruction, and/or weight based adjustment of the mA/kV was utilized to reduce the radiation dose to as low as reasonably achievable. COMPARISON: Chest radiograph 12/03/2020, CT abdomen and pelvis 12/03/2020.  HISTORY: ORDERING SYSTEM PROVIDED HISTORY: pneumonia TECHNOLOGIST PROVIDED HISTORY: pneumonia Reason for Exam: f/u pneumonia Acuity: Unknown Type of Exam: Unknown FINDINGS: Mediastinum: Suspected 17 mm lymph node posterior to the left medial clavicle pulmonary nodule. Follow-up recommendations are below. Suspected enlarged lymph node in the left upper mediastinum posterior to the medial left clavicle, nonspecific. RECOMMENDATIONS: Fleischner Society guidelines for follow-up and management of incidentally detected pulmonary nodules: Nodule size less than 6 mm In a low-risk patient, no routine follow-up. In a high-risk patient, optional CT at 12 months. - Low risk patients include individuals with minimal or absent history of smoking and other known risk factors. - High risk patients include individuals with a history or smoking or known risk factors. Radiology 2017 http://pubs. rsna.org/doi/full/10.1148/radiol. 6435567100      Ct Abdomen Pelvis W Iv Contrast Additional Contrast? None       Thank you for allowing us to participate in the care of this patient. Please call with questions. This note is created with the assistance of a speech recognition program.  While intending to generate adocument that actually reflects the content of the visit, the document can still have some errors including those of syntax and sound a like substitutions which may escape proof reading. It such instances, actual meaningcan be extrapolated by contextual diversion.     Kenyon Rodriguez MD  Office: [phone]  Perfect serve / office [phone]

## 2020-12-10 NOTE — PROGRESS NOTES
no tenderness  Upper Extremities  - no cyanosis, mottling, LUE lymphedema and contracted  Lower Extremities: no cyanosis, mottling; + edema / tight shiny skin    Current Laboratory, Radiologic, Microbiologic, and Diagnostic studies reviewed  Data ReviewCBC:   Recent Labs     12/08/20  0632 12/09/20  1513 12/10/20  0811   WBC 13.6* 13.7* 18.1*   RBC 2.66* 2.77* 2.77*   HGB 7.9* 7.6* 8.4*   HCT 24.2* 24.6* 25.0*    249 357     BMP:   Recent Labs     12/08/20  0632 12/09/20  1045 12/10/20  0811   GLUCOSE 85 89 81    137 135   K 4.0 3.6* 3.2*   BUN 6* 8 13   CREATININE 0.73 0.89 0.82   CALCIUM 8.0* 8.1* 8.5*     ABGs:   No results for input(s): PHART, PO2ART, LPE9MON, ZDT3QWG, BEART, B5ZLMKHI, NQF7LXD in the last 72 hours. PT/INR:  No results found for: PTINR    ASSESSMENT / PLAN:    Pneumonia - ABx  OK for DC with ABx  F/u CT chest 4-6 weeks; may need needle biopsy  CXR ?  PTX; repeat CXR- no PTX  Stable Pulm wise    Electronically signed by Noah Roth on 12/10/20 at 12:15 PM.

## 2020-12-10 NOTE — CARE COORDINATION
Plan remains for the patient to DC to back to [facility]. [facility] is ready for her return whenever she is medically ready to return.

## 2020-12-11 NOTE — PROGRESS NOTES
PULMONARY PROGRESS NOTE:    REASON FOR VISIT: pneumonia  Interval History:    Shortness of Breath: no  Cough: no  Sputum: no          Hemoptysis: no  Chest Pain: no  Fever: no                   Swelling Feet: no  Headache: no                                           Nausea, Emesis, Abdominal Pain: no  Diarrhea: no         Constipation: no  C/o rt foot pain - non ambulatory at baseline    Events since last visit: none    PAST MEDICAL HISTORY:      Scheduled Meds:   potassium chloride  40 mEq Oral BID WC    ferrous sulfate  325 mg Oral BID WC    doxycycline monohydrate  100 mg Oral 2 times per day    predniSONE  20 mg Oral Daily    furosemide  20 mg Intravenous Daily    traZODone  50 mg Oral Nightly    ceFAZolin  2 g Intravenous Q8H    [Held by provider] metoprolol tartrate  50 mg Oral BID    sodium chloride  500 mL Intravenous Once    budesonide-formoterol  2 puff Inhalation BID    divalproex  125 mg Oral BID    sertraline  50 mg Oral Daily    mirtazapine  7.5 mg Oral Nightly    levothyroxine  125 mcg Oral Daily    baclofen  10 mg Oral BID    sodium chloride flush  10 mL Intravenous 2 times per day    enoxaparin  40 mg Subcutaneous Daily     Continuous Infusions:    PRN Meds:promethazine (PHENERGAN) in sodium chloride 0.9% IVPB, HYDROcodone 5 mg - acetaminophen, perflutren lipid microspheres, sodium chloride flush, ipratropium-albuterol, sodium chloride flush, potassium chloride **OR** potassium alternative oral replacement **OR** potassium chloride, magnesium sulfate, acetaminophen **OR** acetaminophen, polyethylene glycol, promethazine **OR** ondansetron        PHYSICAL EXAMINATION:  BP (!) 103/54   Pulse 87   Temp 98.8 °F (37.1 °C) (Oral)   Resp 20   Ht 5' (1.524 m)   Wt 138 lb 11.2 oz (62.9 kg)   SpO2 93%   BMI 27.09 kg/m²   afebrile  General : Awake, alert, non labored respirations  Neck - supple, no lymphadenopathy, JVD not raised  Heart - regular rhythm, S1 and S2 normal; no additional sounds heard  Lungs - Air Entry- fair bilaterally; breath sounds : vesicular, 97% on 2 l nc  Abdomen - soft, no tenderness  Upper Extremities  - no cyanosis, mottling, LUE lymphedema and contracted  Lower Extremities: no cyanosis, mottling; + edema / tight shiny skin    Current Laboratory, Radiologic, Microbiologic, and Diagnostic studies reviewed  Data ReviewCBC:   Recent Labs     12/09/20  1513 12/10/20  0811 12/11/20  0742   WBC 13.7* 18.1* 19.9*   RBC 2.77* 2.77* 2.78*   HGB 7.6* 8.4* 8.2*   HCT 24.6* 25.0* 25.3*    357 394     BMP:   Recent Labs     12/09/20  1045 12/10/20  0811 12/11/20  0742   GLUCOSE 89 81 81    135 140   K 3.6* 3.2* 3.5*   BUN 8 13 16   CREATININE 0.89 0.82 0.93*   CALCIUM 8.1* 8.5* 8.4*     ABGs:   No results for input(s): PHART, PO2ART, JIH2HIP, RTX5PGS, BEART, S5NZHVDZ, NMK4RBD in the last 72 hours. PT/INR:  No results found for: PTINR    ASSESSMENT / PLAN:    Pneumonia - ABx  OK for DC with ABx  F/u CT chest 4-6 weeks; may need needle biopsy  CXR ?  PTX; repeat CXR- no PTX  Stable Pulm wise  OK for DC from Pulmonary    Electronically signed by Martha Dean on 12/11/20 at 11:13 AM.

## 2020-12-11 NOTE — PLAN OF CARE
Problem: Falls - Risk of:  Goal: Will remain free from falls  Description: Will remain free from falls  Outcome: Ongoing  Goal: Absence of physical injury  Description: Absence of physical injury  Outcome: Ongoing     Problem: Pain:  Goal: Pain level will decrease  Description: Pain level will decrease  Outcome: Ongoing  Goal: Control of acute pain  Description: Control of acute pain  Outcome: Ongoing  Goal: Control of chronic pain  Description: Control of chronic pain  Outcome: Ongoing     Problem: Skin Integrity:  Goal: Will show no infection signs and symptoms  Description: Will show no infection signs and symptoms  Outcome: Ongoing  Goal: Absence of new skin breakdown  Description: Absence of new skin breakdown  Outcome: Ongoing  Goal: Demonstration of wound healing without infection will improve  Description: Demonstration of wound healing without infection will improve  Outcome: Ongoing  Goal: Complications related to intravenous access or infusion will be avoided or minimized  Description: Complications related to intravenous access or infusion will be avoided or minimized  Outcome: Ongoing     Problem: Cardiac:  Goal: Ability to maintain vital signs within normal range will improve  Description: Ability to maintain vital signs within normal range will improve  Outcome: Ongoing  Goal: Cardiovascular alteration will improve  Description: Cardiovascular alteration will improve  Outcome: Ongoing     Problem: Health Behavior:  Goal: Will modify at least one risk factor affecting health status  Description: Will modify at least one risk factor affecting health status  Outcome: Ongoing  Goal: Identification of resources available to assist in meeting health care needs will improve  Description: Identification of resources available to assist in meeting health care needs will improve  Outcome: Ongoing     Problem: Physical Regulation:  Goal: Ability to maintain vital signs within normal range will

## 2020-12-11 NOTE — PROGRESS NOTES
[facility] -   Infectious diseases evaluation  admission date 12/3/2020    reason for consultation:   Healthcare acquired pneumonia  Impression :   Current:  · Healthcare acquired pneumonia with cavitary right lower lobe mass/positive mycoplasma IgM. ·  STAPHYLOCOCCUS HOMINIS oxacillin sensitive bacteremia versus contamination could be related to peripheral lines that was removed. · Pacemaker in place  · Possible gout was started on prednisone  · Positive MRSA swab  · Possible pneumothorax  · Hypertension  · Urinary retention  · Chronic CHF  · History of ESBL and MRSA infection    Other:  · Penicillin allergy    Recommendations     · IV vancomycin discontinued 12/7/2020  · 7 days of Levaquin completed on 12/10/2020 for mycoplasma treatment  ·  IV Ancef 2 g IV every 8 hours for MSSE bacteremia in the presence of pacemaker until 1/16/2020 and oral doxycycline 100 mg twice a day until 12/30/2020  · CT chest planned in 4 to 6 weeks by pulmonary with consideration for lung biopsy if no improvement  · Follow blood cultures from 12/5/2020 no growth to date  · Follow CBC renal function  · Continue supportive care  · Discussed with nursing staff          History of Present Illness:   Initial history:  Redd Cancer is a 65y.o.-year-old female with history of CVA was brought to the ER from her nursing home for low blood pressure  for 1 day associated with generalized weakness. The patient had poor IV access and was not able to get IV fluids at the nursing home. The patient was placed on Levophed initially that was weaned off  12/2/2020 WBC 12.6, creatinine normal,  980, lactic acid 1.3, COVID-19 rapid test was negative.   Blood cultures 12/3/2012 2 grew gram-positive cocci in cluster, procalcitonin 0.08, mycoplasma IgM 1.22, C-reactive protein 156, nasal swab for MRSA was positive, respiratory molecular panel with Covid PCR was negative  Chest x-ray showed the left greater than right basilar opacities  CT abdomen pelvis suggestive of enteritis,  lower lobe consolidation, cavitary 4.3 cm mass in the right lower lobe with a small air-fluid level. Failed swallow study, diet was changed  Echocardiogram showed no obvious vegetation  PICC line placed 12/8/2020  Interval changes  12/11/2020   She is complaining of body aches, comfortable on 2 L of oxygen per nasal cannula, occasional cough, not eating well and vomiting after meals, had loose stool, bladder scan showed 274 mL residual, Tran catheter was placed, no reported fever, no other complaints. Patient Vitals for the past 8 hrs:   BP Temp Temp src Pulse Resp SpO2   12/11/20 1502 (!) 112/58 100 °F (37.8 °C) Oral 68 20 90 %   12/11/20 1330 113/74 98.4 °F (36.9 °C) Axillary 93 20 92 %           I have personally reviewed the past medical history, past surgical history, medications, social history, and family history, and I haveupdated the database accordingly. Allergies:   Penicillins and Amoxicillin     Review of Systems:     Review of Systems  Decreased appetite, mild cough and shortness of breath, other than above 12 systems reviewed were negative  Physical Examination :       Physical Exam  HENT:      Head: Normocephalic and atraumatic. Right Ear: External ear normal.      Left Ear: External ear normal.      Mouth/Throat:      Pharynx: Oropharynx is clear. No posterior oropharyngeal erythema. Eyes:      General: No scleral icterus. Conjunctiva/sclera: Conjunctivae normal.   Neck:      Musculoskeletal: Neck supple. No neck rigidity. Cardiovascular:      Rate and Rhythm: Normal rate and regular rhythm. Heart sounds: No murmur. Pulmonary:      Effort: Pulmonary effort is normal.      Breath sounds: Normal breath sounds. No wheezing. Abdominal:      Palpations: Abdomen is soft. Tenderness: There is no abdominal tenderness.    Genitourinary:     Comments: External urinary catheter in place  Musculoskeletal:      Right lower leg: No edema. Left lower leg: No edema. Skin:     General: Skin is warm. Coloration: Skin is not jaundiced. Neurological:      Mental Status: She is alert. Comments: Oriented to person          Past Medical History:     Past Medical History:   Diagnosis Date    Abnormal computed tomography of cervical spine     sclerotic bone appearance     CVA (cerebral vascular accident) ([address]     left  side weakness    GERD (gastroesophageal reflux disease)     Hypertension     Paraproteinemia     Weight loss        Past Surgical  History:     Past Surgical History:   Procedure Laterality Date    ABDOMEN SURGERY N/A 5/25/2019    ABDOMEN DEBRIDEMENT WOUND CLOSURE REOPENING OF RECENT LAPOROATOMY, ABDOMINAL WASHOUT, REPAIR FASCIAL DEHISENCE WITH MESH performed by Marian Melo DO at [facility] 5/15/2019    CHOLECYSTECTOMY performed by Marian Melo DO at [facility] 5/25/2019    GASTROSTOMY TUBE PLACEMENT performed by Marian Melo DO at [facility] N/A 7/27/2019    EGD ESOPHAGOGASTRODUODENOSCOPY WITH REMOVAL OF PEG TUBE performed by Marian Melo DO at [address] [place] [address]  4/14/2019         LAPAROSCOPY N/A 5/15/2019    LAPAROSCOPY EXPLORATORY CONVERTED TO EXPLORATORY LAPAROTOMY/ RIGHT COLON RESECTION AND ANASTAMOSIS/ OPEN CHOLECYSTECTOMY/ EXTENSIVE LYSIS OF ADHESIONS performed by Marian Melo DO at [place] [address]  07/2011    Pacemaker is Medtronic Revo (compatible). Leads placed in 1995 are NOT MRI compatible. Placed at [address]. V's per Dr. Dania Rosenthal can not have an MRI.     UPPER GASTROINTESTINAL ENDOSCOPY N/A 4/16/2019    EGD [place] [address]  [place] 2002 performed by Courtney Franco MD at [facility] OR       Medications:      megestrol  200 mg Oral Daily    potassium chloride  40 mEq Oral BID WC    ferrous sulfate  325 mg Oral BID WC    doxycycline monohydrate  100 mg Oral 2 times per day    predniSONE  20 mg Oral Daily    furosemide  20 mg Intravenous Daily    traZODone  50 mg Oral Nightly    ceFAZolin  2 g Intravenous Q8H    [Held by provider] metoprolol tartrate  50 mg Oral BID    sodium chloride  500 mL Intravenous Once    budesonide-formoterol  2 puff Inhalation BID    divalproex  125 mg Oral BID    sertraline  50 mg Oral Daily    mirtazapine  7.5 mg Oral Nightly    levothyroxine  125 mcg Oral Daily    baclofen  10 mg Oral BID    sodium chloride flush  10 mL Intravenous 2 times per day    enoxaparin  40 mg Subcutaneous Daily       Social History:     Social History     Socioeconomic History    Marital status:       Spouse name: Not on file    Number of children: Not on file    Years of education: Not on file    Highest education level: Not on file   Occupational History    Not on file   Social Needs    Financial resource strain: Not on file    Food insecurity     Worry: Not on file     Inability: Not on file    Transportation needs     Medical: Not on file     Non-medical: Not on file   Tobacco Use    Smoking status: Former Smoker     Packs/day: 1.00     Years: 30.00     Pack years: 30.00    Smokeless tobacco: Never Used   Substance and Sexual Activity    Alcohol use: Not Currently     Alcohol/week: 28.0 standard drinks     Types: 28 Glasses of wine per week    Drug use: No    Sexual activity: Not on file   Lifestyle    Physical activity     Days per week: Not on file     Minutes per session: Not on file    Stress: Not on file   Relationships    Social connections     Talks on phone: Not on file     Gets together: Not on file     Attends [employer] service: Not on file     Active member of club or organization: Not on file     Attends meetings of clubs or organizations: Not on file     Relationship status: Not on file    Intimate partner violence     Fear of current or ex partner: Not on file     Emotionally abused: Not on file     Physically abused: Not on file     Forced sexual activity: Not on file   Other Topics Concern    Not on file   Social History Narrative    Not on file       Family History:   History reviewed. No pertinent family history. Medical Decision Making:   I have independently reviewed/ordered the following labs:    CBC with Differential:   Recent Labs     12/10/20  0811 12/11/20  0742   WBC 18.1* 19.9*   HGB 8.4* 8.2*   HCT 25.0* 25.3*    394   LYMPHOPCT 15* 6*   MONOPCT 12* 10*     BMP:  Recent Labs     12/10/20  0811 12/11/20  0742    140   K 3.2* 3.5*    107   CO2 22 24   BUN 13 16   CREATININE 0.82 0.93*   MG 1.8 1.7     Hepatic Function Panel:   No results for input(s): PROT, LABALBU, BILIDIR, IBILI, BILITOT, ALKPHOS, ALT, AST in the last 72 hours. No results for input(s): RPR in the last 72 hours. No results for input(s): HIV in the last 72 hours. No results for input(s): BC in the last 72 hours. Lab Results   Component Value Date    CREATININE 0.93 12/11/2020    GLUCOSE 81 12/11/2020    GLUCOSE 87 05/21/2012       Detailed results:    Ct Chest Wo Contrast     Result Date: 12/6/2020  EXAMINATION: CT OF THE CHEST WITHOUT CONTRAST 12/4/2020 3:08 pm TECHNIQUE: CT of the chest was performed without the administration of intravenous contrast. Multiplanar reformatted images are provided for review. Dose modulation, iterative reconstruction, and/or weight based adjustment of the mA/kV was utilized to reduce the radiation dose to as low as reasonably achievable. COMPARISON: Chest radiograph 12/03/2020, CT abdomen and pelvis 12/03/2020. HISTORY: ORDERING SYSTEM PROVIDED HISTORY: pneumonia TECHNOLOGIST PROVIDED HISTORY: pneumonia Reason for Exam: f/u pneumonia Acuity: Unknown Type of Exam: Unknown FINDINGS: Mediastinum: Suspected 17 mm lymph node posterior to the left medial clavicle appears to have increased in size from a 2012 chest CT. No other definite lymphadenopathy. No pericardial effusion. Coronary artery calcifications are seen. The thoracic aorta is nonaneurysmal.  The main pulmonary artery is not significantly dilated. Right IJ CVC catheter tip is in the SVC. There is a left chest wall cardiac device with multiple leads. Lungs/pleura: The central airways are patent. Small bilateral pleural effusions. No pneumothorax is seen. Emphysematous changes are seen in the lungs. Similar appearance to the CT abdomen and pelvis from 12/03/2020, there is a cavitary mass in the right lower lobe abutting the pleura, measuring 4.6 x 4.4 x 3.5 cm. There is dependent atelectasis in the right lung. There is a rounded opacity in the left lower lobe broadly abutting the pleura. Additional airspace opacities seen in the left lower lobe. There are also areas of atelectasis in the left lingula and left lower lobe. 3 mm right upper lobe pulmonary nodule (series 601, image 60). Upper Abdomen: Limited images of the upper abdomen are unremarkable. 4 mm hypodensity in the liver is too small to characterize but also seen on the comparison abdomen CT (series 2, image 111). Soft Tissues/Bones: No acute bony abnormality. There are old left-sided rib fractures.      Approximately 4.6 x 4.4 x 3.5 cm cavitary right lower lobe mass, similar to the recent abdomen and pelvis CT from 12/03/2020. This is new from a CT dated 07/25/2019. This could represent a cavitary pneumonia however a primary lung neoplasm is not excluded. Recommend short-term follow-up CT after treatment to demonstrate interval change. Rounded opacity in the left lower lobe broadly abutting the pleura could represent rounded atelectasis or infection. Additional airspace opacities in the left lower lobe are suspicious for infection. This area can also be followed up with CT. Small bilateral pleural effusions. 3 mm right upper lobe pulmonary nodule. Follow-up recommendations are below.  Suspected enlarged lymph node in the left upper mediastinum posterior to the medial left clavicle, nonspecific. RECOMMENDATIONS: Fleischner Society guidelines for follow-up and management of incidentally detected pulmonary nodules: Nodule size less than 6 mm In a low-risk patient, no routine follow-up. In a high-risk patient, optional CT at 12 months. - Low risk patients include individuals with minimal or absent history of smoking and other known risk factors. - High risk patients include individuals with a history or smoking or known risk factors. Radiology 2017 http://pubs. rsna.org/doi/full/10.1148/radiol. 3024356173      Ct Abdomen Pelvis W Iv Contrast Additional Contrast? None       Thank you for allowing us to participate in the care of this patient. Please call with questions. This note is created with the assistance of a speech recognition program.  While intending to generate adocument that actually reflects the content of the visit, the document can still have some errors including those of syntax and sound a like substitutions which may escape proof reading. It such instances, actual meaningcan be extrapolated by contextual diversion.     Stephy Mendoza MD  Office: [phone]  Perfect serve / office [phone]

## 2020-12-11 NOTE — CONSULTS
5/15/2019    LAPAROSCOPY EXPLORATORY CONVERTED TO EXPLORATORY LAPAROTOMY/ RIGHT COLON RESECTION AND ANASTAMOSIS/ OPEN CHOLECYSTECTOMY/ EXTENSIVE LYSIS OF ADHESIONS performed by Andrade Hong DO at [place] [address]  2011    Pacemaker is Medtronic Revo (compatible). Leads placed in  are NOT MRI compatible. Placed at [facility]. V's per Dr. Jamarcus Torres can not have an MRI.  UPPER GASTROINTESTINAL ENDOSCOPY N/A 2019    EGD ESOPHAGOGASTRODUODENOSCOPY @ [place] [address]  ICU  performed by Estrada Gallo MD at [facility]:  Medications Prior to Admission: baclofen (LIORESAL) 10 MG tablet, Take 10 mg by mouth 2 times daily Give 0.5 tablet by mouth two times a day for spasms  [] doxycycline hyclate (VIBRA-TABS) 100 MG tablet, Take 100 mg by mouth 2 times daily  HYDROcodone-acetaminophen (NORCO) 5-325 MG per tablet, Take 1 tablet by mouth every 6 hours as needed for Pain.   dextromethorphan-quiNIDine (NUEDEXTA) 20-10 MG CAPS per capsule, Take 1 capsule by mouth every 12 hours Give 1 capule by mouth every 12 hours for pseudobulbar affect  traZODone (DESYREL) 100 MG tablet, Take 100 mg by mouth nightly For insomnia  divalproex (DEPAKOTE) 125 MG DR tablet, Take 125 mg by mouth 2 times daily  ipratropium-albuterol (DUONEB) 0.5-2.5 (3) MG/3ML SOLN nebulizer solution, Inhale 1 vial into the lungs every 8 hours as needed for Shortness of Breath  furosemide (LASIX) 20 MG tablet, Take 20 mg by mouth daily   mirtazapine (REMERON) 7.5 MG tablet, Take 7.5 mg by mouth nightly  ondansetron (ZOFRAN) 4 MG tablet, Take 4 mg by mouth every 6 hours as needed for Nausea or Vomiting  sertraline (ZOLOFT) 25 MG tablet, Take 50 mg by mouth daily  amiodarone (PACERONE) 100 MG tablet, Take 1 tablet by mouth 2 times daily  metoprolol tartrate (LOPRESSOR) 50 MG tablet, Take 1 tablet by mouth 2 times daily  levothyroxine (SYNTHROID) 75 MCG tablet, Take 1 tablet by mouth Daily (Patient taking differently: Take 125 mcg by mouth Daily )  budesonide-formoterol (SYMBICORT) 160-4.5 MCG/ACT AERO, Inhale 2 puffs into the lungs 2 times daily  Misc. Devices [facility]) MISC, Power wheelchair with left fupper extremity support Dx: stroke with left hemiparesis. Allergies:  Penicillins and Amoxicillin    LABS:  CBC:  Recent Labs     12/09/20  1513 12/10/20  0811 12/11/20  0742   WBC 13.7* 18.1* 19.9*   RBC 2.77* 2.77* 2.78*   HGB 7.6* 8.4* 8.2*   HCT 24.6* 25.0* 25.3*   MCV 89.0 90.3 91.0   MCH 27.5 30.4 29.5   MCHC 30.9* 33.7 32.4   RDW 14.9 15.7* 15.5*    357 394   MPV 7.9 8.2 7.2      Comprehensive Metabolic Profile:   Recent Labs     12/09/20  1045 12/10/20  0811 12/11/20  0742    135 140   K 3.6* 3.2* 3.5*    105 107   CO2 21 22 24   BUN 8 13 16   CREATININE 0.89 0.82 0.93*   GLUCOSE 89 81 81   CALCIUM 8.1* 8.5* 8.4*      ANEMIA STUDIES  Recent Labs     12/10/20  0811   LABIRON 33   TIBC 97*   FERRITIN 1,574*     PT/INR: No results for input(s): PROTIME, INR in the last 72 hours. APTT: No results for input(s): APTT in the last 72 hours. Thyroid functions:   Lab Results   Component Value Date    TSH 0.93 09/16/2020      HgBA1c:    Lab Results   Component Value Date    LABA1C 5.3 04/12/2019     S. Calcium:  Recent Labs     12/11/20  0742   CALCIUM 8.4*     S. Ionized Calcium:No results for input(s): IONCA in the last 72 hours. S. Magnesium:  Recent Labs     12/11/20  0742   MG 1.7     S. Phosphorus:No results for input(s): PHOS in the last 72 hours. S. Glucose:No results for input(s): POCGLU in the last 72 hours. BNP: No results for input(s): BNP in the last 72 hours. FASTING LIPID PANEL:  Lab Results   Component Value Date    CHOL 128 03/16/2020    HDL 30 (L) 03/16/2020    TRIG 99 03/16/2020     CARDIAC ENZYMES: No results for input(s): CKMB, CKMBINDEX, TROPONINI in the last 72 hours. Invalid input(s): CKTOTAL;3  AMYLASE/LIPASE/AMMONIA  No results for input(s):  AMYLASE, LIPASE, AMMONIA in the last 72 hours. Urinalysis:   Lab Results   Component Value Date    NITRU NEGATIVE 12/04/2020    COLORU YELLOW 12/04/2020    PHUR 7.0 12/04/2020    WBCUA 5 TO 10 12/04/2020    RBCUA 0 TO 2 12/04/2020    MUCUS NOT REPORTED 12/04/2020    TRICHOMONAS NOT REPORTED 12/04/2020    YEAST NOT REPORTED 12/04/2020    BACTERIA FEW 12/04/2020    SPECGRAV 1.019 12/04/2020    LEUKOCYTESUR TRACE 12/04/2020    UROBILINOGEN Normal 12/04/2020    BILIRUBINUR NEGATIVE 12/04/2020    BILIRUBINUR NEGATIVE 05/17/2012    GLUCOSEU NEGATIVE 12/04/2020    GLUCOSEU NEGATIVE 05/17/2012    KETUA TRACE 12/04/2020    AMORPHOUS NOT REPORTED 12/04/2020         REVIEW OF SYSTEMS:  · Constitutional: Negative for Fever, chills  · Eyes: Negative for visual changes, diplopia  · ENT: Negative for mouth sores, sore throat. · Cardiovascular: Negative for lightheadedness ,chest pain, palpitations   · Respiratory:Negative for Shortness of breath,cough or wheezing. · Gastrointestinal: Negative for nausea/vomiting, change in bowel habits, abdominal pain  · Genitourinary:Negative for change in bladder habits, dysuria, hematuria. · Musculoskeletal: Negative for joint pain   · Neurological: Negative for headache, change in muscle strength numbness/tingling  · Psychiatric: negative for change in mood, affect      PHYSICAL EXAM:  BP (!) 103/54   Pulse 87   Temp 98.8 °F (37.1 °C) (Oral)   Resp 20   Ht 5' (1.524 m)   Wt 138 lb 11.2 oz (62.9 kg)   SpO2 93%   BMI 27.09 kg/m²      · General appearance: awake, alert, cooperative  · HEENT: Head: Normocephalic, no lesions, without obvious abnormality. · Lungs: clear to auscultation bilaterally  · Heart: regular rate and rhythm, S1, S2 normal, no murmur  · Abdomen: soft, non-tender; bowel sounds normal; no masses,  no organomegaly  · Extremities: extremities normal, atraumatic, no cyanosis or edema  · Neurological:  Awake, alert, oriented to name, place and time.  Cranial nerves II-XII are grossly intact. Reflexes normal and symmetric. Sensation grossly normal  · Eye no icterus no redness  · Psych-normal affect     IMPRESSION  1. Acute kidney injury nonoliguric likely causes includes prerenal azotemia leading to ATN episode of hypotension noted, other causes could be cardiorenal syndrome vs. Vancomycin nephrotoxicity, patient does have urinary retention also. Serum creatinine increased from 0.4 mg/dL to 0.9 mg/dL    2. Acute urinary retention     3. History of CHF with preserved EF, mild to moderate left ventricular hypertrophy diastolic dysfunction, proBNP increased to 19,000 from 980  Chest x-ray right lower lobe airspace disease small bilateral pleural effusion    4. CVA- left sided stroke     5. Hospital acquired pneumonia- currently on IV antibiotics       ASSESSMENT/PLAN:  1. Continue antibiotics as per ID    2. Vancomycin trough levels pending    3. Will calculate FeNa, FeUrea, urine sodium, urine creatinine, urine osmolality , urine urea pending     4. Continue Lasix 20 mg IV daily     Jeny Owusu MD   PGY-2, Family medicine resident  [place], [place]  12/11/2020, 1:40 PM  Addendum to Resident Javier note:   Pt seen,examined and evaluated. I have reviewed the current history, physical findings, labs and assessment and plan and agree with note as documented by resident physician.     Patient initially got admitted for pneumonia, sepsis  Patient did require Levophed transiently blood pressure has been on the lower side  Serum creatinine has been 0.4 mg/dL since admission, but started to increase since 12/8/2020 trending up to 0.9 mg/dL today  Patient was receiving vancomycin which was discontinued on 12/7/2020  Patient also has been receiving IV Lasix    Post void bladder scan did show urinary retention    Assessment plan    Acute kidney injury nonoliguric , multifactorial including urinary retention also rule out prerenal azotemia leading to ATN due to hypotension, other causes could be vancomycin nephrotoxicity.     Plan  Tran catheter placement  Calculate urine excretion of urea  Continue Lasix  If kidney function continues to get worse we will stop Lasix  Avoid nephrotoxic agents  Avoid hypotension  Can add midodrine    Paddy Marin MD

## 2020-12-11 NOTE — PROGRESS NOTES
[email or web address]    PROGRESS NOTE             12/11/2020    8:51 AM    Name:   Pa Lang  MRN:     [id number]     Acct:      [de-identified]   Room:   2098/2098-01   Day:  8  Admit Date:  12/3/2020  1:20 AM    PCP:  Kraig Kay MD  Code Status:  Full Code    Subjective:     C/C:   Chief Complaint   Patient presents with    Fatigue     Interval History Status:     Patient was seen and evaluated by bedside. No acute events overnight. Per RN report, patient has decreased consumption of food and fluids. Patient reports she has no appetite and does not want to eat. She complains of copious sputum production. After eating a few spoonful of food, RN reports patient vomits the food back up. Her potassium is mildly low, 3.5, potassium replacement on board. Creatinine increased to 0.93 from 0.82. Patient produced 2250 mL over 24 hours. CXR shows no change to the right lower lobe airspace disease and no change to bilateral pleural effusion. Brief History:     Please review H&P    Review of Systems:     Review of Systems   Constitutional: Positive for appetite change. Negative for activity change, chills and fever. On dental soft food and nectar thick diet. Decrease food and drink consumption. Complains of pain all over and with light touch. HENT: Negative for congestion. Respiratory: Negative for chest tightness, shortness of breath and wheezing. Cardiovascular: Negative for chest pain, palpitations and leg swelling. Gastrointestinal: Positive for nausea. Negative for abdominal pain, constipation and diarrhea. Genitourinary: Negative for difficulty urinating and dysuria. Self reports she does not produce much urine   Musculoskeletal: Negative for back pain and joint swelling. Skin: Negative for rash. Neurological: Negative for dizziness, weakness and headaches.    Psychiatric/Behavioral: Negative for agitation and behavioral problems. Medications: Allergies: Allergies   Allergen Reactions    Penicillins Shortness Of Breath and Rash    Amoxicillin        Current Meds:   Scheduled Meds:    potassium chloride  40 mEq Oral BID WC    ferrous sulfate  325 mg Oral BID WC    doxycycline monohydrate  100 mg Oral 2 times per day    predniSONE  20 mg Oral Daily    furosemide  20 mg Intravenous Daily    traZODone  50 mg Oral Nightly    ceFAZolin  2 g Intravenous Q8H    [Held by provider] metoprolol tartrate  50 mg Oral BID    sodium chloride  500 mL Intravenous Once    budesonide-formoterol  2 puff Inhalation BID    divalproex  125 mg Oral BID    sertraline  50 mg Oral Daily    mirtazapine  7.5 mg Oral Nightly    levothyroxine  125 mcg Oral Daily    baclofen  10 mg Oral BID    sodium chloride flush  10 mL Intravenous 2 times per day    enoxaparin  40 mg Subcutaneous Daily     Continuous Infusions:   PRN Meds: promethazine (PHENERGAN) in sodium chloride 0.9% IVPB, HYDROcodone 5 mg - acetaminophen, perflutren lipid microspheres, sodium chloride flush, ipratropium-albuterol, sodium chloride flush, potassium chloride **OR** potassium alternative oral replacement **OR** potassium chloride, magnesium sulfate, acetaminophen **OR** acetaminophen, polyethylene glycol, promethazine **OR** ondansetron    Data:     Past Medical History:   has a past medical history of Abnormal computed tomography of cervical spine, CVA (cerebral vascular accident) ([address] GERD (gastroesophageal reflux disease), Hypertension, Paraproteinemia, and Weight loss. Social History:   reports that she has quit smoking. She has a 30.00 pack-year smoking history. She has never used smokeless tobacco. She reports previous alcohol use of about 28.0 standard drinks of alcohol per week. She reports that she does not use drugs. Family History: History reviewed. No pertinent family history.     Vitals:  BP (!) 103/54   Pulse 87   Temp 98.8 °F (37.1 °C) (Oral)   Resp 20   Ht 5' (1.524 m)   Wt 138 lb 11.2 oz (62.9 kg)   SpO2 93%   BMI 27.09 kg/m²   Temp (24hrs), Av.3 °F (36.8 °C), Min:97.6 °F (36.4 °C), Max:98.8 °F (37.1 °C)    No results for input(s): POCGLU in the last 72 hours. I/O(24Hr): Intake/Output Summary (Last 24 hours) at 2020 0851  Last data filed at 2020 0547  Gross per 24 hour   Intake 533 ml   Output 2300 ml   Net -1767 ml       Labs:    CBC with Differential:    Lab Results   Component Value Date    WBC 19.9 2020    RBC 2.78 2020    RBC 3.66 2012    HGB 8.2 2020    HCT 25.3 2020     2020     2012    MCV 91.0 2020    MCH 29.5 2020    MCHC 32.4 2020    RDW 15.5 2020    METASPCT 2 12/10/2020    LYMPHOPCT 6 2020    MONOPCT 10 2020    MYELOPCT 1 12/10/2020    BASOPCT 0 2020    MONOSABS 1.99 2020    LYMPHSABS 1.19 2020    EOSABS 0.00 2020    BASOSABS 0.00 2020    DIFFTYPE NOT REPORTED 2020     BMP:    Lab Results   Component Value Date     2020    K 3.5 2020     2020    CO2 24 2020    BUN 16 2020    LABALBU 2.3 2020    LABALBU 4.6 2012    CREATININE 0.93 2020    CALCIUM 8.4 2020    GFRAA >60 2020    LABGLOM 59 2020    GLUCOSE 81 2020    GLUCOSE 87 2012       Lab Results   Component Value Date/Time    SPECIAL RIGHT AC10 ML 2020 12:54 PM    SPECIAL RIGHT WRIST 3ML 2020 12:54 PM     Lab Results   Component Value Date/Time    CULTURE NO GROWTH 6 DAYS 2020 12:54 PM    CULTURE NO GROWTH 6 DAYS 2020 12:54 PM         Radiology:    Xr Ankle Right (2 Views)    Result Date: 2020  EXAMINATION: 3 XRAY VIEWS OF THE RIGHT FOOT; 3 XRAY VIEWS OF THE RIGHT ANKLE 2020 1:30 pm COMPARISON: None.  HISTORY: ORDERING SYSTEM PROVIDED HISTORY: foot pain TECHNOLOGIST PROVIDED HISTORY: foot pain Reason for Exam: foot pain. best images per pt condition Acuity: Acute Type of Exam: Initial; ORDERING SYSTEM PROVIDED HISTORY: foot pain TECHNOLOGIST PROVIDED HISTORY: foot pain Reason for Exam: foot pain. best images per pt condition FINDINGS: Right foot: Mottled osteopenia. Anatomic alignment. No acute fracture. The joint spaces appear normal. Left ankle: Anatomic alignment. No fracture. Mottled osteopenia. 1. No acute bony or joint abnormality 2. Mottled osteopenia     Xr Foot Right (2 Views)    Result Date: 12/9/2020  EXAMINATION: 3 XRAY VIEWS OF THE RIGHT FOOT; 3 XRAY VIEWS OF THE RIGHT ANKLE 12/9/2020 1:30 pm COMPARISON: None. HISTORY: ORDERING SYSTEM PROVIDED HISTORY: foot pain TECHNOLOGIST PROVIDED HISTORY: foot pain Reason for Exam: foot pain. best images per pt condition Acuity: Acute Type of Exam: Initial; ORDERING SYSTEM PROVIDED HISTORY: foot pain TECHNOLOGIST PROVIDED HISTORY: foot pain Reason for Exam: foot pain. best images per pt condition FINDINGS: Right foot: Mottled osteopenia. Anatomic alignment. No acute fracture. The joint spaces appear normal. Left ankle: Anatomic alignment. No fracture. Mottled osteopenia. 1. No acute bony or joint abnormality 2. Mottled osteopenia     Ct Chest Wo Contrast    Result Date: 12/6/2020  EXAMINATION: CT OF THE CHEST WITHOUT CONTRAST 12/4/2020 3:08 pm TECHNIQUE: CT of the chest was performed without the administration of intravenous contrast. Multiplanar reformatted images are provided for review. Dose modulation, iterative reconstruction, and/or weight based adjustment of the mA/kV was utilized to reduce the radiation dose to as low as reasonably achievable. COMPARISON: Chest radiograph 12/03/2020, CT abdomen and pelvis 12/03/2020.  HISTORY: ORDERING SYSTEM PROVIDED HISTORY: pneumonia TECHNOLOGIST PROVIDED HISTORY: pneumonia Reason for Exam: f/u pneumonia Acuity: Unknown Type of Exam: Unknown FINDINGS: Mediastinum: Suspected 17 mm lymph node posterior to the left medial clavicle appears to have increased in size from a 2012 chest CT. No other definite lymphadenopathy. No pericardial effusion. Coronary artery calcifications are seen. The thoracic aorta is nonaneurysmal.  The main pulmonary artery is not significantly dilated. Right IJ CVC catheter tip is in the SVC. There is a left chest wall cardiac device with multiple leads. Lungs/pleura: The central airways are patent. Small bilateral pleural effusions. No pneumothorax is seen. Emphysematous changes are seen in the lungs. Similar appearance to the CT abdomen and pelvis from 12/03/2020, there is a cavitary mass in the right lower lobe abutting the pleura, measuring 4.6 x 4.4 x 3.5 cm. There is dependent atelectasis in the right lung. There is a rounded opacity in the left lower lobe broadly abutting the pleura. Additional airspace opacities seen in the left lower lobe. There are also areas of atelectasis in the left lingula and left lower lobe. 3 mm right upper lobe pulmonary nodule (series 601, image 60). Upper Abdomen: Limited images of the upper abdomen are unremarkable. 4 mm hypodensity in the liver is too small to characterize but also seen on the comparison abdomen CT (series 2, image 111). Soft Tissues/Bones: No acute bony abnormality. There are old left-sided rib fractures. Approximately 4.6 x 4.4 x 3.5 cm cavitary right lower lobe mass, similar to the recent abdomen and pelvis CT from 12/03/2020. This is new from a CT dated 07/25/2019. This could represent a cavitary pneumonia however a primary lung neoplasm is not excluded. Recommend short-term follow-up CT after treatment to demonstrate interval change. Rounded opacity in the left lower lobe broadly abutting the pleura could represent rounded atelectasis or infection. Additional airspace opacities in the left lower lobe are suspicious for infection. This area can also be followed up with CT.  Small bilateral pleural effusions. 3 mm right upper lobe pulmonary nodule. Follow-up recommendations are below. Suspected enlarged lymph node in the left upper mediastinum posterior to the medial left clavicle, nonspecific. RECOMMENDATIONS: Fleischner Society guidelines for follow-up and management of incidentally detected pulmonary nodules: Nodule size less than 6 mm In a low-risk patient, no routine follow-up. In a high-risk patient, optional CT at 12 months. - Low risk patients include individuals with minimal or absent history of smoking and other known risk factors. - High risk patients include individuals with a history or smoking or known risk factors. Radiology 2017 http://pubs. rsna.org/doi/full/10.1148/radiol. 3889401504     Ct Abdomen Pelvis W Iv Contrast Additional Contrast? None    Result Date: 12/3/2020  EXAMINATION: CT OF THE ABDOMEN AND PELVIS WITH CONTRAST 12/3/2020 2:49 am TECHNIQUE: CT of the abdomen and pelvis was performed with the administration of intravenous contrast. Multiplanar reformatted images are provided for review. Dose modulation, iterative reconstruction, and/or weight based adjustment of the mA/kV was utilized to reduce the radiation dose to as low as reasonably achievable. COMPARISON: August 13, 2019. HISTORY: ORDERING SYSTEM PROVIDED HISTORY: global abdominal pain TECHNOLOGIST PROVIDED HISTORY: global abdominal pain Reason for Exam: Fatigue, abd pain. Due to patient condition, unable to have patient lay flat  or bring arms above head Acuity: Acute Type of Exam: Initial Relevant Medical/Surgical History: History, Gtube/peg tube placement, cholecystectomy FINDINGS: Lower Chest: Partially visualized left lower lobe heterogeneous consolidation. Trace left pleural fluid with pleural thickening. Right lower lobe 4.2 x 3.3 cm masslike opacity with central necrosis, central air-fluid level and mild surrounding heterogeneous/nodular opacities. Emphysema. Partially visualized cardiac pacer leads. Atherosclerosis of the visualized thoracic aorta. Liver: Few scattered subcentimeter hypodensities in the liver are similar to the prior study and likely represent benign cysts. No new suspicious liver mass. Smooth liver contour. Gallbladder and Bile Ducts: Prior cholecystectomy. Spleen: Normal. Adrenal Glands: Normal. Pancreas: Normal. Genitourinary: Normal. Bowel: The stomach is incompletely distended and not well evaluated. Postsurgical changes of the bowel. Normal caliber bowel. Fluid throughout the colon. Vasculature: Atherosclerosis. No abdominal aortic aneurysm. Patent main portal vein. Bones and Soft Tissues: Osteopenia. Degenerative changes in the visualized spine and pelvis. L3 superior endplate 16-59% height loss was not present on the prior study but appears to be chronic. Retroperitoneum/Mesentery: No intraperitoneal free air, ascites or fluid collection. No lymphadenopathy in the abdomen or pelvis. Fluid throughout the colon suggests enteritis. No bowel obstruction. Postsurgical changes of the bowel. Partially visualized left lower lobe heterogeneous consolidation with trace left basilar pleural fluid and pleural thickening. There is also a cavitary 4.3 cm mass in the right lower lobe which demonstrates a small air-fluid level. Findings may relate to infection. Underlying neoplastic process is not excluded. Short-term follow-up chest CT may be helpful for further evaluation. Kate Silva [facility]/replace/removal    Result Date: 12/8/2020  PROCEDURE: ULTRASOUND GUIDED VASCULAR ACCESS. FLUOROSCOPY GUIDED PICC PLACEMENT 12/8/2020. HISTORY: ORDERING SYSTEM PROVIDED HISTORY: Long term antibiotic therapy TECHNOLOGIST PROVIDED HISTORY: Long term antibiotic therapy Pneumonia SEDATION: None FLUOROSCOPY DOSE AND TYPE OR TIME AND EXPOSURES: 1 minutes; D  cGy cm2 TECHNIQUE: This procedure was performed by Dr. Chandler Leiva.  Informed consent was obtained after a detailed explanation of the procedure including risks, benefits, and alternatives. Universal protocol was observed. The right arm was prepped and draped in sterile fashion using maximum sterile barrier technique. Local anesthesia was achieved with lidocaine. A micropuncture needle was used to access the right brachial vein using ultrasound guidance. An ultrasound image demonstrating patency of the vein with needle tip located within it. An image was obtained and stored in PACs. A 0.018 guidewire was used to place a peel-a-way sheath and a 5 Arabic dual-lumen PICC was advanced with fluoroscopic guidance with the tip at the cavo-atrial junction. The catheter flushed easily and there was a good blood return. The catheter was secured to the skin. The patient tolerated the procedure well and there were no immediate complications. EBL: Less than 3 mL FINDINGS: Fluoroscopic image demonstrates the tip of the catheter at the cavo-atrial junction. Successful ultrasound and fluoroscopy guided PICC placement     Xr Chest Portable    Result Date: 12/10/2020  EXAMINATION: ONE XRAY VIEW OF THE CHEST 12/10/2020 6:10 pm COMPARISON: December 8, 2020 HISTORY: ORDERING SYSTEM PROVIDED HISTORY: PNA; copious sputum TECHNOLOGIST PROVIDED HISTORY: PNA; copious sputum Reason for Exam: PNA; copious sputum Acuity: Acute Type of Exam: Subsequent/Follow-up Additional signs and symptoms: PNA; copious sputum FINDINGS: Bipolar pacer on the left unchanged. New PICC line on the right terminates 3 cm below the atrial caval junction. Right lower lobe airspace disease. Small bilateral pleural effusions. Heart and mediastinum normal.  Bony thorax intact. New PICC line on the right as above. No change right lower lobe airspace disease. No change bilateral pleural effusions.      Xr Chest Portable    Result Date: 12/8/2020  EXAMINATION: ONE XRAY VIEW OF THE CHEST 12/8/2020 9:30 am COMPARISON: 8 December 2020 at 826 hours HISTORY: [address] [place] [address] HISTORY: r/o pneumothorax R-side. inconclusive on recent prior exam TECHNOLOGIST PROVIDED HISTORY: r/o pneumothorax R-side. inconclusive on recent prior exam Reason for Exam: This is the second xray taken of this patient in 1 hour. The patient would not cooperate on the previous xray and she did not cooperate on this xray. The patient talked and moved during the whole exam. Acuity: Unknown Type of Exam: Unknown FINDINGS: AP portable view of the chest time stamped at 951 hours demonstrates overlying cardiac monitoring electrodes. Heart size is stable. Bipolar pacemaker enters from the left with intact leads in appropriate positions. There is no change in bibasilar opacities and small bilateral effusions. No sizable extrapleural air. Small linear area of hypodensity is present peripherally in the right upper hemithorax which may be related to trace extrapleural air. No sizable pneumothorax. Linear lucency laterally in the right upper hemithorax suspicious for trace extrapleural air. No change in bibasilar opacities. Xr Chest Portable    Result Date: 12/8/2020  EXAMINATION: ONE XRAY VIEW OF THE CHEST 12/8/2020 8:27 am COMPARISON: Chest radiograph performed 12/07/2020. HISTORY: ORDERING SYSTEM PROVIDED HISTORY: Pneumonia TECHNOLOGIST PROVIDED HISTORY: Pneumonia Reason for Exam: Pt would not cooperate with tech and she would not hold still. Acuity: Chronic Type of Exam: Ongoing FINDINGS: There are bilateral effusions with adjacent bibasilar consolidation. There is a vertical line running parallel to the lateral pleural surface of the right lung. Lung markings appear to extend beyond this towards the pleural surface and it is difficult to discern whether this represents a pneumothorax versus skin fold. The mediastinal structures are stable with stable cardiac leads presumably in the right atrium and right ventricle. The upper abdomen is unremarkable. The extrathoracic soft tissues are unremarkable. redemonstration of bilateral basilar airspace disease with left basilar consolidation and right lateral basilar masslike opacity. Continued attention on follow-up recommended. Small left pleural effusion. Xr Chest Portable    Result Date: 12/3/2020  EXAMINATION: ONE XRAY VIEW OF THE CHEST 12/3/2020 2:20 am COMPARISON: May 29, 2019. HISTORY: ORDERING SYSTEM PROVIDED HISTORY: weakness TECHNOLOGIST PROVIDED HISTORY: weakness Reason for Exam: Pt c/o weakness, low blood pressure. Acuity: Acute Type of Exam: Initial Additional signs and symptoms: Pt c/o weakness, low blood pressure. FINDINGS: Frontal portable view of the chest.  Low left lung volume. Hyperexpanded right lung volume. Leftward shift of the mediastinal structures. Left greater than right basilar heterogeneous opacities. Small bilateral pleural effusions versus pleural thickening. No pneumothorax. Atherosclerotic thoracic aorta. No significant cardiomegaly. Left pectoral trans venous dual-chamber cardiac pacer device. Osteopenia. Multilevel degenerative disc disease. Left greater than right basilar heterogeneous opacities. Differential considerations include atelectasis/scarring, asymmetric pulmonary edema and an infectious/inflammatory process. Follow-up PA and lateral chest radiographs or chest CT may be helpful for further evaluation as warranted. Small bilateral pleural effusions versus pleural thickening. Low left lung volume with leftward shift of the mediastinal structures. Hyperexpanded right lung volume. Fl Modified Barium Swallow W Video    Result Date: 12/7/2020  EXAMINATION: MODIFIED BARIUM SWALLOW WAS PERFORMED IN CONJUNCTION WITH [facility] TECHNIQUE: Fluoroscopic evaluation of the swallowing mechanism was performed with multiple consistency of barium product.  FLUOROSCOPY DOSE AND TYPE OR TIME AND EXPOSURES: Fluoro time 2:08 min DAP 86.2 dGy cm2 AIR KERMA 16.6 mGy COMPARISON: None HISTORY: ORDERING SYSTEM PROVIDED HISTORY: r/o aspiration TECHNOLOGIST PROVIDED HISTORY: r/o aspiration Reason for Exam: r/o aspiration Acuity: Acute Type of Exam: Initial FINDINGS: Oral phase of swallowing was grossly within normal limits. Deep penetration noted with thin liquid consistency. Premature vallecular spillage with all consistencies. Prolonged mastication of regular solid consistency due to sparse dentition. No evidence of aspiration. Deep penetration with thin liquid consistency. Please see separate speech pathology report for full discussion of findings and recommendations. Physical Examination:        Physical Exam  Constitutional:       General: She is not in acute distress. Appearance: Normal appearance. She is normal weight. She is not ill-appearing or toxic-appearing. HENT:      Head: Normocephalic and atraumatic. Cardiovascular:      Rate and Rhythm: Normal rate and regular rhythm. Pulses: Normal pulses. Heart sounds: Normal heart sounds. No murmur. Pulmonary:      Effort: Pulmonary effort is normal. No respiratory distress. Breath sounds: Normal breath sounds. No wheezing. Comments: 2L NC  Abdominal:      General: Abdomen is flat. Bowel sounds are normal.      Palpations: Abdomen is soft. Tenderness: There is no abdominal tenderness. There is no guarding or rebound. Comments: Surgical incision along mid-abdomen   Genitourinary:     Comments: Urinate in external wick. Produced 2250ml 24hrs. Musculoskeletal:      Right lower leg: No edema. Left lower leg: No edema. Skin:     General: Skin is warm and dry. Comments: Sensitive to light touch, causes her pain. Neurological:      Mental Status: She is alert.            Assessment:        Primary Problem  Hospital-acquired pneumonia    Active Hospital Problems    Diagnosis Date Noted    Pacemaker [Z95.0] 12/05/2020    Bacteremia due to Staphylococcus [R78.81, B95.8]     Line sepsis ([address] [T85.79XA, A41.9] <0.40  -Not on IV fluids due to fluid overload  -Continue to monitor Creatinine     Possible GI bleed vs Anemia  Hemoglobin 7.6, downward trend from 9.5  Iron studies shows anemia of chronic disease  Oral iron on board     Gout  XR of right ankle and foot shows no acute bony or joint abnormalities, mild to osteopenia present. Prednisone 20 mg daily for 7 doses, on third dose today.     Sedation secondary to polypharmacy (improving)  -Patient arousable but complains of fatigue  -Noted that nightly medications include trazodone, mirtazapine, metoprolol, baclofen, Norco, and Depakote  -Trazodone decreased from 100 mg per night to 50 mg per night yesterday 12/7     COPD  -Continue home medication Symbicort BID daily and DuoNeb PRN     Hypokalemia (Improving)  -Potassium 3.5 (12/11)  -Potassium replacement on board, rechecked later in evening    Saskia Altamirano MD  12/11/2020  8:51 AM     Attending Physician Statement  I have discussed the care of Sarah Kraus with the resident team. I have examined the patient myself and taken ros and hpi , including pertinent history and exam findings,  with the resident. I have reviewed the key elements of all parts of the encounter with the resident. I agree with the assessment, plan and orders as documented by the resident. Principal Problem:    Hospital-acquired pneumonia  Active Problems:    CVA (cerebral vascular accident) ([facility] [address]    MRSA colonization    Urinary retention    Elevated C-reactive protein (CRP)    Chronic obstructive pulmonary disease, unspecified (HCC)    Hypotension    Line sepsis (HCC)    Pacemaker    Bacteremia due to Staphylococcus  Resolved Problems:    * No resolved hospital problems.  *    Continues to need 2 L oxygen  Has been passing urine well, on 20 mg IV Lasix daily however creatinine worsening  PICC line inserted for Ancef plan for 5 weeks, for MSSA bacteremia  Patient looking brighter today, right foot appears better    Full note to follow.       Electronically signed by Tiki Iglesias MD

## 2020-12-11 NOTE — PROGRESS NOTES
Speech Language Pathology  Speech Language Pathology  [facility]    Dysphagia Treatment Note    Date: 12/11/2020  Patients Name: Iva Rinne  MRN: [id number]  Diagnosis: dysphagia  Patient Active Problem List   Diagnosis Code    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    CVA (cerebral vascular accident) ([facility] [address] I63.9    Abnormal computed tomography of cervical spine R93.7    Weight loss R63.4    Functional gait abnormality R26.89    Left knee pain M25.562    Knee pain, left M25.562    Acute pain of left knee M25.562    Sepsis due to urinary tract infection (HCC) A41.9, N39.0    Cystitis N30.90    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    Septic shock (HCC) A41.9, R65.21    Leukemoid reaction D72.823    Aspiration pneumonia of right lower lobe due to vomit (HCC) J69.0    MRSA colonization Z22.322    Urinary retention R33.9    Abdominal distention R14.0    Elevated CEA R97.0    Elevated CA 19-9 level R97.8    Cholecystitis K81.9    Elevated C-reactive protein (CRP) R79.82    Elevated procalcitonin R79.89    Bandemia D72.825    Centrilobular emphysema (HCC) J43.2    Hemorrhage of rectum and anus K62.5    GI bleed K92.2    Anemia D64.9    Small bowel obstruction (HCC) K56.609    Anxiety disorder F41.9    Noncompliance Z91.19    Acute respiratory failure (HCC) J96.00    Chronic obstructive pulmonary disease, unspecified (HCC) J44.9    Bacteremia due to coagulase-negative Staphylococcus R78.81, B95.7    Bradycardia R00.1    Fever R50.9    Abdominal wall abscess at site of surgical wound T81.49XA    Hypotension I95.9    Hospital-acquired pneumonia J18.9, Y95    Line sepsis (HCC) T85.79XA, A41.9    Pacemaker Z95.0    Bacteremia due to Staphylococcus R78.81, B95.8       Pain: 10/10 (\"everywhere\")    Dysphagia Treatment  Treatment time:   9902-4447  Subjective: [x] Alert [x] Cooperative     [x] Confused     [] Agitated      [] Lethargic    Objective/Assessment:    Pt  Very audibly congested, diet

## 2020-12-11 NOTE — PROGRESS NOTES
Paraproteinemia, and Weight loss. has a past surgical history that includes pacemaker placement (07/2011); intubation (4/14/2019); Upper gastrointestinal endoscopy (N/A, 4/16/2019); laparoscopy (N/A, 5/15/2019); Cholecystectomy (N/A, 5/15/2019); Abdomen surgery (N/A, 5/25/2019); Gastrostomy tube placement (N/A, 5/25/2019); and Peg Tube Removal (N/A, 7/27/2019). Restrictions  Restrictions/Precautions  Restrictions/Precautions: General Precautions, Fall Risk, Isolation, Contact Precautions, Modified Diet(DROPLET/CONTACT precautions; nectar thick liquids)  Implants present? : Pacemaker  Position Activity Restriction  Other position/activity restrictions: hx CVA 8 years ago L side  Subjective   General  Family / Caregiver Present: No  Subjective  Subjective: pt fefused any type of bed mobility due to just \"rolled all over\"  General Comment  Comments: spoke with RN- pt just had catheter and completely cleaned-up  Pain Screening  Patient Currently in Pain: Yes  Pain Assessment  Pain Assessment: 0-10  Pain Level: 10  Pain Location: Leg  Pain Orientation: Right  Vital Signs  Patient Currently in Pain: Yes             Objective          Exercises  Comments: B UE propped on pillows due to edema in B hands and L forearm  Other exercises  Other exercises 1: PROM to L LE and B UE(pt would not allow ROM to R LE due to pain)        Goals  Short term goals  Time Frame for Short term goals: 5 days  Short term goal 1: Pt to demo L/R rolling mod x1. Short term goal 2: Pt to improve supine<-->sit mod x2. Short term goal 3: Pt to tolerate sitting EOB 10 minutes, min x1 FAIR sitting balance. Short term goal 4: pt to reduce pain to 3-4/10 to improve mobility and tolerate 30-45 minute PT session. Short term goal 5: Pt to tolerate strengthening/ROM x15 reps B UE/LE to reduce risk of contractures and maintain strength. Short term goal 6: Pt to improve posture to GOOD/FAIR+. Patient Goals   Patient goals :  To decreased pain    Plan Plan  Times per week: 4x/week  Specific instructions for Next Treatment: increase endurance for activity, ROM, bed mobility, positioning, strengthening/stretching  Current Treatment Recommendations: Strengthening, ROM, Balance Training, Functional Mobility Training, Endurance Training, Safety Education & Training, Positioning, Patient/Caregiver Education & Training, Pain Management  Safety Devices  Type of devices: Call light within reach, Left in bed, Bed alarm in place  Restraints  Initially in place: No     Therapy Time   Individual Concurrent Group Co-treatment   Time In 1445         Time Out 1500         Minutes [address] [facility],

## 2020-12-12 PROBLEM — R65.21 SEPTIC SHOCK (HCC): Status: ACTIVE | Noted: 2020-01-01

## 2020-12-12 PROBLEM — N17.9 AKI (ACUTE KIDNEY INJURY) (HCC): Status: ACTIVE | Noted: 2020-01-01

## 2020-12-12 PROBLEM — A41.9 SEPTIC SHOCK (HCC): Status: ACTIVE | Noted: 2020-01-01

## 2020-12-12 NOTE — PROGRESS NOTES
Patient continues to be monitored at bedside by IM resdient Refugia Vasquez WILCOX and nursing staff.

## 2020-12-12 NOTE — PLAN OF CARE
Problem: Falls - Risk of:  Goal: Will remain free from falls  Description: Will remain free from falls  12/12/2020 1813 by Mis Stone RN  Outcome: Ongoing  12/12/2020 0841 by Alphonso Cason RN  Outcome: Met This Shift  Goal: Absence of physical injury  Description: Absence of physical injury  12/12/2020 1813 by Mis Stone RN  Outcome: Ongoing  12/12/2020 0841 by Alphonso Cason RN  Outcome: Met This Shift     Problem: Pain:  Goal: Pain level will decrease  Description: Pain level will decrease  12/12/2020 1813 by Mis Stone RN  Outcome: Ongoing  12/12/2020 0841 by Alphonso Cason RN  Outcome: Met This Shift  Goal: Control of acute pain  Description: Control of acute pain  12/12/2020 1813 by Mis Stone RN  Outcome: Ongoing  12/12/2020 0841 by Alphonso Cason RN  Outcome: Met This Shift  Goal: Control of chronic pain  Description: Control of chronic pain  12/12/2020 1813 by Mis Stone RN  Outcome: Ongoing  12/12/2020 0841 by Alphonso Cason RN  Outcome: Met This Shift     Problem: Skin Integrity:  Goal: Will show no infection signs and symptoms  Description: Will show no infection signs and symptoms  12/12/2020 1813 by Mis Stone RN  Outcome: Ongoing  12/12/2020 0841 by Alphonso Cason RN  Outcome: Ongoing  Goal: Absence of new skin breakdown  Description: Absence of new skin breakdown  12/12/2020 1813 by Mis Stone RN  Outcome: Ongoing  12/12/2020 0841 by Alphonso Cason RN  Outcome: Met This Shift  Goal: Demonstration of wound healing without infection will improve  Description: Demonstration of wound healing without infection will improve  12/12/2020 1813 by Mis Stone RN  Outcome: Ongoing  12/12/2020 0841 by Alphonso Cason RN  Outcome: Met This Shift  Goal: Complications related to intravenous access or infusion will be avoided or minimized Description: Complications related to intravenous access or infusion will be avoided or minimized  12/12/2020 1813 by Mita Tinajero RN  Outcome: Ongoing  12/12/2020 0841 by Angely Bingham RN  Outcome: Met This Shift     Problem: Cardiac:  Goal: Ability to maintain vital signs within normal range will improve  Description: Ability to maintain vital signs within normal range will improve  12/12/2020 1813 by Mita Tinajero RN  Outcome: Ongoing  12/12/2020 0841 by Angely Bingham RN  Outcome: Not Met This Shift  Goal: Cardiovascular alteration will improve  Description: Cardiovascular alteration will improve  12/12/2020 1813 by Mita Tinajero RN  Outcome: Ongoing  12/12/2020 0841 by Angely Bingham RN  Outcome: Not Met This Shift     Problem: Health Behavior:  Goal: Will modify at least one risk factor affecting health status  Description: Will modify at least one risk factor affecting health status  12/12/2020 1813 by Mita Tinajero RN  Outcome: Ongoing  12/12/2020 0841 by Angely Bingham RN  Outcome: Ongoing  Goal: Identification of resources available to assist in meeting health care needs will improve  Description: Identification of resources available to assist in meeting health care needs will improve  12/12/2020 1813 by Mita Tinajero RN  Outcome: Ongoing  12/12/2020 0841 by Angely Bingham RN  Outcome: Ongoing     Problem: Physical Regulation:  Goal: Ability to maintain vital signs within normal range will improve  Description: Ability to maintain vital signs within normal range will improve  12/12/2020 1813 by Mita Tinajero RN  Outcome: Ongoing  12/12/2020 0841 by Angely Bingham RN  Outcome: Not Met This Shift  Goal: Complications related to the disease process, condition or treatment will be avoided or minimized  Description: Complications related to the disease process, condition or treatment will be avoided or minimized  12/12/2020 1813 by Mita Tinajero RN Outcome: Ongoing  12/12/2020 0841 by Arthur Mendez RN  Outcome: Not Met This Shift  Goal: Diagnostic test results will improve  Description: Diagnostic test results will improve  12/12/2020 1813 by Jose Rafael Vasquez RN  Outcome: Ongoing  12/12/2020 0841 by Arthur Mendez RN  Outcome: Not Met This Shift  Goal: Will remain free from infection  Description: Will remain free from infection  12/12/2020 1813 by Jose Rafael Vasquez RN  Outcome: Ongoing  12/12/2020 0841 by Arthur Mendez RN  Outcome: Ongoing     Problem: Nutritional:  Goal: Nutritional status will improve  Description: Nutritional status will improve  12/12/2020 1813 by Jose Rafael Vasquez RN  Outcome: Ongoing  12/12/2020 0841 by Arthur Mendez RN  Outcome: Not Met This Shift     Problem: Respiratory:  Goal: Ability to maintain normal respiratory secretions will improve  Description: Ability to maintain normal respiratory secretions will improve  12/12/2020 1813 by Jose Rafael Vasquez RN  Outcome: Ongoing  12/12/2020 0841 by Arthur Mendez RN  Outcome: Not Met This Shift     Problem: HEMODYNAMIC STATUS  Goal: Patient has stable vital signs and fluid balance  12/12/2020 1813 by Jose Rafael Vasquez RN  Outcome: Ongoing  12/12/2020 0841 by Arthur Mendez RN  Outcome: Not Met This Shift     Problem: ACTIVITY INTOLERANCE/IMPAIRED MOBILITY  Goal: Mobility/activity is maintained at optimum level for patient  12/12/2020 1813 by Jose Rafael Vasquez RN  Outcome: Ongoing  12/12/2020 0841 by Arthur Mendez RN  Outcome: Met This Shift     Problem: COMMUNICATION IMPAIRMENT  Goal: Ability to express needs and understand communication  12/12/2020 1813 by Jose Rafael Vasquez RN  Outcome: Ongoing  12/12/2020 0841 by Arthur Mendez RN  Outcome: Met This Shift     Problem: Nutrition  Goal: Optimal nutrition therapy  Description: Nutrition Problem #1: Inadequate oral intake Intervention: Food and/or Nutrient Delivery: Continue Current Diet, Start Oral Nutrition Supplement  Nutritional Goals: po intake greater than 50%     12/12/2020 1813 by Jose Rafael Vasquez RN  Outcome: Ongoing  12/12/2020 0841 by Arthur Mendez RN  Outcome: Not Met This Shift     Problem: Musculor/Skeletal Functional Status  Goal: Highest potential functional level  12/12/2020 1813 by Jose Rafael Vasquez RN  Outcome: Ongoing  12/12/2020 0841 by Arthur Mendez RN  Outcome: Ongoing  Goal: Absence of falls  12/12/2020 1813 by Jose Rafael Vasquez RN  Outcome: Ongoing  12/12/2020 0841 by Arthur Mendez RN  Outcome: Met This Shift

## 2020-12-12 NOTE — CONSULTS
Gastroenterology Consult Note      Patient: Madeleine Mixon  : 1950  Acct#:  [id number]     Date:  2020    Subjective:       History of Present Illness  Patient is a 65 y.o.  female admitted with Hypotension [I95.9] who is seen in consult for anemia  This is a sick patient has been here for almost couple weeks presented with sepsis due to UTI, she had hospital-acquired pneumonia she had acute cystitis, admitted since 15 3, she resides in [facility].   Did better in fact they were thinking she she might be discharged soon but today she was found to be hypotensive  And hemoglobin was found to be 6.8 for which we were consulted  After 1 unit of blood went up to 8.1 looking at her baseline  She is very  Between 9 and 10 for at least few months, and in this admission should be running between 7 and 8  Patient and the nursing staff denies seeing any blood anywhere she had no hematemesis although she vomited yesterday but no blood, she did not have any black stool she denied any bright blood per rectum, she denied any abdominal pain at present although she says she did have some this morning diffusely  Her liver enzymes are been normal in admission on 12-3-2020 lipase is also been normal  Patient did have a CAT scan when she came on 12/3/2020 indicating some enteritis  Patient says she had a colonoscopy around a year ago  But I found an EGD on 2019  The patient had grade 2 esophagitis, hiatal hernia, gastroparesis  The patient has small bowel obstruction in the past also        Past Medical History:   Diagnosis Date    Abnormal computed tomography of cervical spine     sclerotic bone appearance     CVA (cerebral vascular accident) ([address]     left  side weakness    GERD (gastroesophageal reflux disease)     Hypertension     Paraproteinemia     Weight loss       Past Surgical History:   Procedure Laterality Date    ABDOMEN SURGERY N/A 2019 ABDOMEN DEBRIDEMENT WOUND CLOSURE REOPENING OF RECENT LAPOROATOMY, ABDOMINAL WASHOUT, REPAIR FASCIAL DEHISENCE WITH MESH performed by Duke Qiu DO at [facility] 5/15/2019    CHOLECYSTECTOMY performed by Duke Qiu DO at [address] 5/25/2019    GASTROSTOMY TUBE PLACEMENT performed by Duke Qiu DO at [facility] N/A 7/27/2019    EGD ESOPHAGOGASTRODUODENOSCOPY WITH REMOVAL OF PEG TUBE performed by Duke Qiu DO at [address]  4/14/2019         LAPAROSCOPY N/A 5/15/2019    LAPAROSCOPY EXPLORATORY CONVERTED TO EXPLORATORY LAPAROTOMY/ RIGHT COLON RESECTION AND ANASTAMOSIS/ OPEN CHOLECYSTECTOMY/ EXTENSIVE LYSIS OF ADHESIONS performed by Duke Qiu DO at [place] [address]  07/2011    Pacemaker is Medtronic Revo (compatible). Leads placed in 1995 are NOT MRI compatible. Placed at [facility]. V's per Dr. Medina Coma can not have an MRI.  UPPER GASTROINTESTINAL ENDOSCOPY N/A 4/16/2019    EGD ESOPHAGOGASTRODUODENOSCOPY @ BEDSIDE  ICU 2002 performed by Margaux Rendon MD at [address]      Past Endoscopic History as above I could not find the colonoscopy report she is talking about from the EGD as mentioned    Admission Meds  No current facility-administered medications on file prior to encounter. Current Outpatient Medications on File Prior to Encounter   Medication Sig Dispense Refill    baclofen (LIORESAL) 10 MG tablet Take 10 mg by mouth 2 times daily Give 0.5 tablet by mouth two times a day for spasms      HYDROcodone-acetaminophen (NORCO) 5-325 MG per tablet Take 1 tablet by mouth every 6 hours as needed for Pain.       dextromethorphan-quiNIDine (NUEDEXTA) 20-10 MG CAPS per capsule Take 1 capsule by mouth every 12 hours Give 1 capule by mouth every 12 hours for pseudobulbar affect      traZODone (DESYREL) 100 MG tablet Take 100 mg by mouth nightly For insomnia  divalproex (DEPAKOTE) 125 MG DR tablet Take 125 mg by mouth 2 times daily      ipratropium-albuterol (DUONEB) 0.5-2.5 (3) MG/3ML SOLN nebulizer solution Inhale 1 vial into the lungs every 8 hours as needed for Shortness of Breath      furosemide (LASIX) 20 MG tablet Take 20 mg by mouth daily       mirtazapine (REMERON) 7.5 MG tablet Take 7.5 mg by mouth nightly      ondansetron (ZOFRAN) 4 MG tablet Take 4 mg by mouth every 6 hours as needed for Nausea or Vomiting      sertraline (ZOLOFT) 25 MG tablet Take 50 mg by mouth daily      amiodarone (PACERONE) 100 MG tablet Take 1 tablet by mouth 2 times daily 30 tablet 3    metoprolol tartrate (LOPRESSOR) 50 MG tablet Take 1 tablet by mouth 2 times daily 60 tablet 3    levothyroxine (SYNTHROID) 75 MCG tablet Take 1 tablet by mouth Daily (Patient taking differently: Take 125 mcg by mouth Daily ) 30 tablet 3    budesonide-formoterol (SYMBICORT) 160-4.5 MCG/ACT AERO Inhale 2 puffs into the lungs 2 times daily      Misc. Devices [facility]) [address] [facility] wheelchair with left fupper extremity support  Dx: stroke with left hemiparesis. 1 each 0       Patient   Does Use ASA, NSAID No  Allergies  Allergies   Allergen Reactions    Penicillins Shortness Of Breath and Rash    Amoxicillin         Social   Social History     Tobacco Use    Smoking status: Former Smoker     Packs/day: 1.00     Years: 30.00     Pack years: 30.00    Smokeless tobacco: Never Used   Substance Use Topics    Alcohol use: Not Currently     Alcohol/week: 28.0 standard drinks     Types: 28 Glasses of wine per week        PSYCH HISTORY:  Depression No  Anxiety Yes  Suicide No       History reviewed. No pertinent family history. No family history of colon cancer, Crohn's disease, or ulcerative colitis.      Review of Systems  Constitutional: negative  Eyes: negative  Ears, nose, mouth, throat, and face: negative  Respiratory: negative  Cardiovascular: negative  Gastrointestinal: negative Genitourinary:negative  Integument/breast: negative  Hematologic/lymphatic: negative  Musculoskeletal:negative  Endocrine: negative           Physical Exam  Blood pressure (!) 83/47, pulse 88, temperature 99.7 °F (37.6 °C), temperature source Axillary, resp. rate 20, height 5' (1.524 m), weight 137 lb 3.2 oz (62.2 kg), SpO2 98 %. General Appearance: alert and oriented to person, place and time, well-developed and well-nourished, in no acute distress  Skin: warm and dry, no rash or erythema  Head: normocephalic and atraumatic  Eyes: pupils equal, round, and reactive to light, extraocular eye movements intact, conjunctivae normal  ENT: hearing grossly normal bilaterally  Neck: neck supple and non tender without mass, no thyromegaly or thyroid nodules, no cervical lymphadenopathy   Pulmonary/Chest: clear to auscultation bilaterally- no wheezes, rales or rhonchi, normal air movement, no respiratory distress  Cardiovascular: normal rate, regular rhythm, normal S1 and S2, no murmurs, rubs, clicks or gallops, distal pulses intact, no carotid bruits  Abdomen: soft, non-tender, non-distended, normal bowel sounds, no masses or organomegaly  Extremities: no cyanosis, clubbing or edema  Musculoskeletal: normal range of motion, no joint swelling, deformity or tenderness  Neurologic: no cranial nerve deficit and muscle strength normal    Data Review:    Recent Labs     12/10/20  0811 12/11/20  0742 12/12/20 0412 12/12/20  1256   WBC 18.1* 19.9* 22.1*  --    HGB 8.4* 8.2* 6.8* 8.1*   HCT 25.0* 25.3* 20.7* 25.3*   MCV 90.3 91.0 88.0  --     394 363  --      Recent Labs     12/10/20  0811 12/11/20  0742 12/12/20  0412    140 137   K 3.2* 3.5* 4.7  4.7    107 106   CO2 22 24 22   BUN 13 16 16   CREATININE 0.82 0.93* 1.03*     No results for input(s): AST, ALT, ALB, BILIDIR, BILITOT, ALKPHOS in the last 72 hours. No results for input(s): LIPASE, AMYLASE in the last 72 hours. No results for input(s): PROTIME, INR in the last 72 hours. No results for input(s): PTT in the last 72 hours. No results for input(s): OCCULTBLD in the last 72 hours. CEA:    Lab Results   Component Value Date    CEA 5.6 04/15/2019     Ca 125:    Lab Results   Component Value Date     62 04/15/2019     Ca 19-9:    Lab Results   Component Value Date     49 04/15/2019     Ca 15-3:  No results found for:   AFP:  No components found for: AFAFP  Beta HCG:  No components found for: BHCG  Neuron Specific Enolase:  No results found for: NSE  Imaging Studies:                           All appropriate imaging studies and reports reviewed: Yes                 Assessment:     Principal Problem:    Hospital-acquired pneumonia  Active Problems:    CVA (cerebral vascular accident) ([facility] [address]    Septic shock ([facility] [address]    MRSA colonization    Urinary retention    Elevated C-reactive protein (CRP)    Centrilobular emphysema (HCC)    Anxiety disorder    Acute respiratory failure (HCC)    Chronic obstructive pulmonary disease, unspecified (HCC)    Line sepsis (HCC)    Pacemaker    Bacteremia due to Staphylococcus    SURINDER (acute kidney injury) ([facility] [address]  Resolved Problems:    * No resolved hospital problems. *    Vomiting  Drop in hemoglobin  Hypotension related to sepsis from UTI/pneumonia  History of small bowel obstruction  History of severe esophagitis    Recommendations:   H&H monitoring transfuse as needed  Anemia work-up  Acute abdominal series to rule out small bowel obstruction  PPI  Treat the other serious issues with dehydration and sepsis by other specialist                        Thank you for allowing me to participate in the care of your patient. Please feel free to contact me with any questions or concerns.      Bridget Membreno MD

## 2020-12-12 NOTE — PLAN OF CARE
Patient was seen and examined with Dr. Adrian Kamara. Patient was admitted here and was being treated for septic shock secondary to MRSA line sepsis patient has been improving . However yesterday her condition deteriorated further  Patient became hypotensive  Hemoglobin drop was noted from 8.2-6.7  And patient was given a unit of blood and fluid bolus  Her tachycardia improved  Blood pressure is still low  And we believe that are orthostatic and hypovolemia could also be contributed by poor oral intake that she has had for the last 2 days  Also she is on diuretics for diastolic heart failure    We are going to hold the diuretics  We are already doing a unit of blood  We will give her some saline  I will keep her on IV because of the poor oral intake is not clear what the cause of blood loss is  To be noted that patient does have difficulty swallowing eating for several days at a time  Not clear why so she could have esophageal or gastric pathology resulting in this issue and she could have some blood upper GI bleed. We will check stool for occult blood and will consult GI.

## 2020-12-12 NOTE — PROGRESS NOTES
Patient's hemoglobin is 6.8 this morning. Hemoglobin was 8.2 yesterday. Patient's blood pressure is 69/40.  500 normal saline bolus ordered. 1 unit of packed red blood cells ordered.

## 2020-12-12 NOTE — PLAN OF CARE
Description: Identification of resources available to assist in meeting health care needs will improve  Outcome: Ongoing     Problem: Physical Regulation:  Goal: Will remain free from infection  Description: Will remain free from infection  Outcome: Ongoing     Problem: Musculor/Skeletal Functional Status  Goal: Highest potential functional level  Outcome: Ongoing     Problem: Cardiac:  Goal: Ability to maintain vital signs within normal range will improve  Description: Ability to maintain vital signs within normal range will improve  Outcome: Not Met This Shift  Goal: Cardiovascular alteration will improve  Description: Cardiovascular alteration will improve  Outcome: Not Met This Shift     Problem: Physical Regulation:  Goal: Ability to maintain vital signs within normal range will improve  Description: Ability to maintain vital signs within normal range will improve  Outcome: Not Met This Shift  Goal: Complications related to the disease process, condition or treatment will be avoided or minimized  Description: Complications related to the disease process, condition or treatment will be avoided or minimized  Outcome: Not Met This Shift  Goal: Diagnostic test results will improve  Description: Diagnostic test results will improve  Outcome: Not Met This Shift     Problem: Nutritional:  Goal: Nutritional status will improve  Description: Nutritional status will improve  Outcome: Not Met This Shift     Problem: Respiratory:  Goal: Ability to maintain normal respiratory secretions will improve  Description: Ability to maintain normal respiratory secretions will improve  Outcome: Not Met This Shift     Problem: HEMODYNAMIC STATUS  Goal: Patient has stable vital signs and fluid balance  Outcome: Not Met This Shift     Problem: Nutrition  Goal: Optimal nutrition therapy  Description: Nutrition Problem #1: Inadequate oral intake Intervention: Food and/or Nutrient Delivery: Continue Current Diet, Start Oral Nutrition Supplement  Nutritional Goals: po intake greater than 50%     Outcome: Not Met This Shift

## 2020-12-12 NOTE — PROGRESS NOTES
PULMONARY PROGRESS NOTE:    REASON FOR VISIT: pneumonia  Interval History:    Shortness of Breath: no  Cough: no  Sputum: no          Hemoptysis: no  Chest Pain: no  Fever: no                   Swelling Feet: no  Headache: no                                           Nausea, Emesis, Abdominal Pain: no  Diarrhea: no         Constipation: no  C/o rt foot pain - non ambulatory at baseline  Overall not feeling well  Events since last visit: none; developed sinus tachycardia; drop in Hgb noted- along with hypotension- transfusion given    PAST MEDICAL HISTORY:      Scheduled Meds:   sodium chloride  500 mL Intravenous Once    sodium chloride  500 mL Intravenous Once    megestrol  200 mg Oral Daily    potassium chloride  40 mEq Oral BID WC    ferrous sulfate  325 mg Oral BID WC    doxycycline monohydrate  100 mg Oral 2 times per day    predniSONE  20 mg Oral Daily    furosemide  20 mg Intravenous Daily    traZODone  50 mg Oral Nightly    ceFAZolin  2 g Intravenous Q8H    [Held by provider] metoprolol tartrate  50 mg Oral BID    budesonide-formoterol  2 puff Inhalation BID    divalproex  125 mg Oral BID    sertraline  50 mg Oral Daily    mirtazapine  7.5 mg Oral Nightly    levothyroxine  125 mcg Oral Daily    baclofen  10 mg Oral BID    sodium chloride flush  10 mL Intravenous 2 times per day    [Held by provider] enoxaparin  40 mg Subcutaneous Daily     Continuous Infusions:    PRN Meds:promethazine (PHENERGAN) in sodium chloride 0.9% IVPB, HYDROcodone 5 mg - acetaminophen, perflutren lipid microspheres, sodium chloride flush, ipratropium-albuterol, sodium chloride flush, potassium chloride **OR** potassium alternative oral replacement **OR** potassium chloride, magnesium sulfate, acetaminophen **OR** acetaminophen, polyethylene glycol, promethazine **OR** ondansetron        PHYSICAL EXAMINATION: BP (!) 83/47   Pulse 88   Temp 99.7 °F (37.6 °C) (Axillary)   Resp 20   Ht 5' (1.524 m)   Wt 137 lb 3.2 oz (62.2 kg)   SpO2 98%   BMI 26.80 kg/m²   afebrile  General : Awake, alert, non labored respirations  Neck  supple, no lymphadenopathy, JVD not raised  Heart  regular rhythm, S1 and S2 normal; no additional sounds heard  Lungs  Air Entry- fair bilaterally; breath sounds : vesicular, 97% on 4 l nc  Abdomen  soft, no tenderness  Upper Extremities  - no cyanosis, mottling, LUE lymphedema and contracted  Lower Extremities: no cyanosis, mottling; + edema / tight shiny skin    Current Laboratory, Radiologic, Microbiologic, and Diagnostic studies reviewed  Data ReviewCBC:   Recent Labs     12/10/20  0811 12/11/20  0742 12/12/20  0412 12/12/20  1256   WBC 18.1* 19.9* 22.1*  --    RBC 2.77* 2.78* 2.35*  --    HGB 8.4* 8.2* 6.8* 8.1*   HCT 25.0* 25.3* 20.7* 25.3*    394 363  --      BMP:   Recent Labs     12/10/20  0811 12/11/20  0742 12/12/20  0412   GLUCOSE 81 81 83    140 137   K 3.2* 3.5* 4.7  4.7   BUN 13 16 16   CREATININE 0.82 0.93* 1.03*   CALCIUM 8.5* 8.4* 7.9*     ABGs:   No results for input(s): PHART, PO2ART, HXB9NXP, TID1YPZ, BEART, Q0MNEBHA, RMO3MTM in the last 72 hours.    PT/INR:  No results found for: PTINR    ASSESSMENT / PLAN:    Pneumonia - ABx  F/u CT chest 4-6 weeks; may need needle biopsy   sinus tachycardia/ anemia/ hypotension- GI consult/ monitor Hgb  Leucocytosis etio unclear- Dr Tobias Johnson to address antibiotics    Electronically signed by Shannan Calzada on 12/12/20 at 2:03 PM.

## 2020-12-12 NOTE — PROGRESS NOTES
Nurse reports patient's heart rate is in the 140s. Patient is complaining of body pain. Patient given 1 tablet Norco.  EKG shows sinus tachycardia with a ventricular rate of 135. Patient denies chest pain or shortness of breath. No acute distress. Patient's heart rate came down to the 120s on its own. She is currently receiving IV potassium replacement. Will check a potassium and magnesium level after her IV potassium is done. Will give patient 250 mL bolus normal saline.

## 2020-12-12 NOTE — PROGRESS NOTES
[address] [employer]    PROGRESS NOTE             12/12/2020    8:20 AM    Name:   Marcelo Eubanks  MRN:     [id number]     Acct:      [de-identified]   Room:   2098/2098-01   Day:  9  Admit Date:  12/3/2020  1:20 AM    PCP:  Joao Cardenas MD  Code Status:  Full Code    Subjective:     C/C:   Chief Complaint   Patient presents with    Fatigue     Interval History Status:     Patient was seen and evaluated by bedside. Per RN report and CNP note, patient had episode of heart rate in the 140s. EKG showed sinus tachycardia with ventricular rate of 135. Patient was not in acute distress. Patient heart rate decreased to the 120s with no intervention. Potassium and magnesium were replaced, and she was given 250 mL bolus normal saline. In the early morning, patient's hemoglobin dropped to 6.8 from 8.2 yesterday. Her blood pressure was 69/40.  500 normal saline bolus was given. 1u of pRBC is being given to her this morning. We will check hemoglobin again later today. This morning during my assessment of the patient, patient is not in acute distress. There is no visible blood or bruising on her body. Per nurse report, there is no blood seen in her emesis or sputum. Patient also denies hematemesis, chest pain, shortness of breath, abdominal pain. Stool occult on 12/11 was negative for blood. Another stool occult was ordered for today. Patient is still visibly edematous in the arms, may be her baseline. No congestion heard on lung examination. Patient still complains of pain all over and she is currently on 4 L of nasal cannula. Oxygen requirement was increased when she was hypotensive. There is erythema on the dorsum of her right foot below the ankle. It is , but improving. Patient is currently on prednisone, IV Ancef, oral doxycycline.     Brief History:     Please review H&P    Review of Systems:     Review of Systems Constitutional: Positive for appetite change. Negative for activity change, chills and fever. On dental soft food and nectar thick diet. Decrease food and drink consumption. Complains of pain all over and with light touch. HENT: Negative for congestion. Respiratory: Negative for chest tightness, shortness of breath and wheezing. Cardiovascular: Negative for chest pain, palpitations and leg swelling. Gastrointestinal: Positive for nausea. Negative for abdominal pain, constipation and diarrhea. Genitourinary: Negative for difficulty urinating and dysuria. Self reports she does not produce much urine   Musculoskeletal: Negative for back pain and joint swelling. Skin: Negative for rash. Neurological: Negative for dizziness, weakness and headaches. Psychiatric/Behavioral: Negative for agitation and behavioral problems. Medications: Allergies:     Allergies   Allergen Reactions    Penicillins Shortness Of Breath and Rash    Amoxicillin        Current Meds:   Scheduled Meds:    sodium chloride  20 mL Intravenous Once    sodium chloride  500 mL Intravenous Once    megestrol  200 mg Oral Daily    potassium chloride  40 mEq Oral BID WC    ferrous sulfate  325 mg Oral BID WC    doxycycline monohydrate  100 mg Oral 2 times per day    predniSONE  20 mg Oral Daily    furosemide  20 mg Intravenous Daily    traZODone  50 mg Oral Nightly    ceFAZolin  2 g Intravenous Q8H    [Held by provider] metoprolol tartrate  50 mg Oral BID    budesonide-formoterol  2 puff Inhalation BID    divalproex  125 mg Oral BID    sertraline  50 mg Oral Daily    mirtazapine  7.5 mg Oral Nightly    levothyroxine  125 mcg Oral Daily    baclofen  10 mg Oral BID    sodium chloride flush  10 mL Intravenous 2 times per day    [Held by provider] enoxaparin  40 mg Subcutaneous Daily     Continuous Infusions: PRN Meds: promethazine (PHENERGAN) in sodium chloride 0.9% IVPB, HYDROcodone 5 mg - acetaminophen, perflutren lipid microspheres, sodium chloride flush, ipratropium-albuterol, sodium chloride flush, potassium chloride **OR** potassium alternative oral replacement **OR** potassium chloride, magnesium sulfate, acetaminophen **OR** acetaminophen, polyethylene glycol, promethazine **OR** ondansetron    Data:     Past Medical History:   has a past medical history of Abnormal computed tomography of cervical spine, CVA (cerebral vascular accident) ([address] GERD (gastroesophageal reflux disease), Hypertension, Paraproteinemia, and Weight loss. Social History:   reports that she has quit smoking. She has a 30.00 pack-year smoking history. She has never used smokeless tobacco. She reports previous alcohol use of about 28.0 standard drinks of alcohol per week. She reports that she does not use drugs. Family History: History reviewed. No pertinent family history. Vitals:  BP (!) 86/49   Pulse 98   Temp 99.1 °F (37.3 °C) (Oral)   Resp 20   Ht 5' (1.524 m)   Wt 137 lb 3.2 oz (62.2 kg)   SpO2 96%   BMI 26.80 kg/m²   Temp (24hrs), Av °F (37.2 °C), Min:98 °F (36.7 °C), Max:100 °F (37.8 °C)    No results for input(s): POCGLU in the last 72 hours. I/O(24Hr):     Intake/Output Summary (Last 24 hours) at 2020 0820  Last data filed at 2020 1727  Gross per 24 hour   Intake 650 ml   Output 350 ml   Net 300 ml       Labs:    CBC with Differential:    Lab Results   Component Value Date    WBC 22.1 2020    RBC 2.35 2020    RBC 3.66 2012    HGB 6.8 2020    HCT 20.7 2020     2020     2012    MCV 88.0 2020    MCH 28.8 2020    MCHC 32.7 2020    RDW 15.8 2020    METASPCT 2 12/10/2020    LYMPHOPCT 6 2020    MONOPCT 10 2020    MYELOPCT 1 12/10/2020    BASOPCT 0 2020    MONOSABS 1.99 2020 LYMPHSABS 1.19 12/11/2020    EOSABS 0.00 12/11/2020    BASOSABS 0.00 12/11/2020    DIFFTYPE NOT REPORTED 12/11/2020     BMP:    Lab Results   Component Value Date     12/12/2020    K 4.7 12/12/2020    K 4.7 12/12/2020     12/12/2020    CO2 22 12/12/2020    BUN 16 12/12/2020    LABALBU 2.3 12/03/2020    LABALBU 4.6 05/17/2012    CREATININE 1.03 12/12/2020    CALCIUM 7.9 12/12/2020    GFRAA >60 12/12/2020    LABGLOM 53 12/12/2020    GLUCOSE 83 12/12/2020    GLUCOSE 87 05/21/2012       Lab Results   Component Value Date/Time    SPECIAL RIGHT AC10 ML 12/05/2020 12:54 PM    SPECIAL RIGHT WRIST 3ML 12/05/2020 12:54 PM     Lab Results   Component Value Date/Time    CULTURE NO GROWTH 6 DAYS 12/05/2020 12:54 PM    CULTURE NO GROWTH 6 DAYS 12/05/2020 12:54 PM         Radiology:    Echo Complete 2d W Doppler W Color    Result Date: 12/4/2020 pulmonic insufficiency. No obvious valvular vegetation visualized on this study. Trivial pericardial effusion. Signature ----------------------------------------------------------------------------  Electronically signed by Zoila Torres(Sonographer) on 2020 01:01  PM ---------------------------------------------------------------------------- ----------------------------------------------------------------------------  Electronically signed by Moustapha Marrero(Interpreting physician) on  2020 02:55 PM ---------------------------------------------------------------------------- FINDINGS Left Atrium Left atrium is normal in size. Left Ventricle Small LV cavity. Normal left ventricular ejection fraction (>55%). Normal wall motions. Mild-moderate left ventricular hypertrophy. Right Atrium Pacing lead seen in the right atrium. Right atrium is normal size . Right Ventricle Pacemaker lead seen in right ventricle. Normal right ventricle size and function. Mitral Valve Normal mitral valve structure and function. Mild mitral regurgitation. Aortic Valve Aortic valve is trileaflet. No aortic insufficiency. No aortic stenosis. Tricuspid Valve Normal tricuspid valve structure and function. Mild tricuspid regurgitation. Estimated right ventricular systolic pressure is 42 mmHg. Mildly elevated right ventricular systolic pressure. Pulmonic Valve Pulmonic valve not well visualized. No pulmonic insufficiency. Pericardial Effusion Trivial pericardial effusion. Miscellaneous Normal aortic root dimension. E/e' average 12.5 IVC normal diameter & inspiratory collapse indicating normal RA filling pressure .  M-mode / 2D Measurements & Calculations:   LVIDd:3.33 cm(3.7 - 5.6 cm)      Diastolic VJMJBV:73.9 ml  NOYDU:0.87 cm(2.2 - 4.0 cm)      Systolic MZBPSE:91.9 ml  NFBC:9.67 cm(0.6 - 1.1 cm)       Aortic Root:3.6 cm(2.0 - 3.7 cm)  LVPWd:1.32 cm(0.6 - 1.1 cm)      LA Dimension: 2.01 cm(1.9 - 4.0 cm)  Fractional Shortenin.34 %    LA volume/Index: 17.2 ml /11m^2  Calculated LVEF (%): 73.35 %     LVOT:2.4 cm   Mitral:                                Aortic   Peak E-Wave: 0.77 m/s                  Peak Velocity: 1.75 m/s  Peak A-Wave: 1.09 m/s                  Mean Velocity: 1.21 m/s  E/A Ratio: 0.7                         Peak Gradient: 12.25 mmHg  Peak Gradient: 2.35 mmHg               Mean Gradient: 7 mmHg  Mean Gradient: 3 mmHg  Deceleration Time: 123 msec                                         Area (continuity): 3.9 cm^2                                         AV VTI: 29.6 cm   Area (continuity): 4.4 cm^2  Mean Velocity: 0.73 m/s   Tricuspid:                             Pulmonic:   Estimated RVSP: 42 mmHg  Peak TR Velocity: 3.12 m/s  Peak TR Gradient: 38.9376 mmHg  Estimated RA Pressure: 3 mmHg                                         Estimated PASP: 41.94 mmHg  Septal Wall E' velocity:0.06 m/s Lateral Wall E' velocity:0.07 m/s    Xr Ankle Right (2 Views)    Result Date: 12/9/2020  EXAMINATION: 3 XRAY VIEWS OF THE RIGHT FOOT; 3 XRAY VIEWS OF THE RIGHT ANKLE 12/9/2020 1:30 pm COMPARISON: None. HISTORY: ORDERING SYSTEM PROVIDED HISTORY: foot pain TECHNOLOGIST PROVIDED HISTORY: foot pain Reason for Exam: foot pain. best images per pt condition Acuity: Acute Type of Exam: Initial; ORDERING SYSTEM PROVIDED HISTORY: foot pain TECHNOLOGIST PROVIDED HISTORY: foot pain Reason for Exam: foot pain. best images per pt condition FINDINGS: Right foot: Mottled osteopenia. Anatomic alignment. No acute fracture. The joint spaces appear normal. Left ankle: Anatomic alignment. No fracture. Mottled osteopenia. 1. No acute bony or joint abnormality 2.  Mottled osteopenia     Xr Foot Right (2 Views)    Result Date: 12/9/2020 EXAMINATION: 3 XRAY VIEWS OF THE RIGHT FOOT; 3 XRAY VIEWS OF THE RIGHT ANKLE 12/9/2020 1:30 pm COMPARISON: None. HISTORY: ORDERING SYSTEM PROVIDED HISTORY: foot pain TECHNOLOGIST PROVIDED HISTORY: foot pain Reason for Exam: foot pain. best images per pt condition Acuity: Acute Type of Exam: Initial; ORDERING SYSTEM PROVIDED HISTORY: foot pain TECHNOLOGIST PROVIDED HISTORY: foot pain Reason for Exam: foot pain. best images per pt condition FINDINGS: Right foot: Mottled osteopenia. Anatomic alignment. No acute fracture. The joint spaces appear normal. Left ankle: Anatomic alignment. No fracture. Mottled osteopenia. 1. No acute bony or joint abnormality 2.  Mottled osteopenia     Ct Chest Wo Contrast    Result Date: 12/6/2020 abnormality. There are old left-sided rib fractures. Approximately 4.6 x 4.4 x 3.5 cm cavitary right lower lobe mass, similar to the recent abdomen and pelvis CT from 12/03/2020. This is new from a CT dated 07/25/2019. This could represent a cavitary pneumonia however a primary lung neoplasm is not excluded. Recommend short-term follow-up CT after treatment to demonstrate interval change. Rounded opacity in the left lower lobe broadly abutting the pleura could represent rounded atelectasis or infection. Additional airspace opacities in the left lower lobe are suspicious for infection. This area can also be followed up with CT. Small bilateral pleural effusions. 3 mm right upper lobe pulmonary nodule. Follow-up recommendations are below. Suspected enlarged lymph node in the left upper mediastinum posterior to the medial left clavicle, nonspecific. RECOMMENDATIONS: Fleischner Society guidelines for follow-up and management of incidentally detected pulmonary nodules: Nodule size less than 6 mm In a low-risk patient, no routine follow-up. In a high-risk patient, optional CT at 12 months. - Low risk patients include individuals with minimal or absent history of smoking and other known risk factors. - High risk patients include individuals with a history or smoking or known risk factors. Radiology 2017 http://pubs. rsna.org/doi/full/10.1148/radiol. 8612898398     Ct Abdomen Pelvis W Iv Contrast Additional Contrast? None    Result Date: 12/3/2020 EXAMINATION: CT OF THE ABDOMEN AND PELVIS WITH CONTRAST 12/3/2020 2:49 am TECHNIQUE: CT of the abdomen and pelvis was performed with the administration of intravenous contrast. Multiplanar reformatted images are provided for review. Dose modulation, iterative reconstruction, and/or weight based adjustment of the mA/kV was utilized to reduce the radiation dose to as low as reasonably achievable. COMPARISON: August 13, 2019. HISTORY: ORDERING SYSTEM PROVIDED HISTORY: global abdominal pain TECHNOLOGIST PROVIDED HISTORY: global abdominal pain Reason for Exam: Fatigue, abd pain. Due to patient condition, unable to have patient lay flat  or bring arms above head Acuity: Acute Type of Exam: Initial Relevant Medical/Surgical History: History, Gtube/peg tube placement, cholecystectomy FINDINGS: Lower Chest: Partially visualized left lower lobe heterogeneous consolidation. Trace left pleural fluid with pleural thickening. Right lower lobe 4.2 x 3.3 cm masslike opacity with central necrosis, central air-fluid level and mild surrounding heterogeneous/nodular opacities. Emphysema. Partially visualized cardiac pacer leads. Atherosclerosis of the visualized thoracic aorta. Liver: Few scattered subcentimeter hypodensities in the liver are similar to the prior study and likely represent benign cysts. No new suspicious liver mass. Smooth liver contour. Gallbladder and Bile Ducts: Prior cholecystectomy. Spleen: Normal. Adrenal Glands: Normal. Pancreas: Normal. Genitourinary: Normal. Bowel: The stomach is incompletely distended and not well evaluated. Postsurgical changes of the bowel. Normal caliber bowel. Fluid throughout the colon. Vasculature: Atherosclerosis. No abdominal aortic aneurysm. Patent main portal vein. Bones and Soft Tissues: Osteopenia. Degenerative changes in the visualized spine and pelvis. L3 superior endplate 02-58% height loss was not present on the prior study but appears to be chronic. Retroperitoneum/Mesentery: No intraperitoneal free air, ascites or fluid collection. No lymphadenopathy in the abdomen or pelvis. Fluid throughout the colon suggests enteritis. No bowel obstruction. Postsurgical changes of the bowel. Partially visualized left lower lobe heterogeneous consolidation with trace left basilar pleural fluid and pleural thickening. There is also a cavitary 4.3 cm mass in the right lower lobe which demonstrates a small air-fluid level. Findings may relate to infection. Underlying neoplastic process is not excluded. Short-term follow-up chest CT may be helpful for further evaluation.      Kate Her Device Plmt/replace/removal    Result Date: 12/8/2020 PROCEDURE: ULTRASOUND GUIDED VASCULAR ACCESS. FLUOROSCOPY GUIDED PICC PLACEMENT 12/8/2020. HISTORY: ORDERING SYSTEM PROVIDED HISTORY: Long term antibiotic therapy TECHNOLOGIST PROVIDED HISTORY: Long term antibiotic therapy Pneumonia SEDATION: None FLUOROSCOPY DOSE AND TYPE OR TIME AND EXPOSURES: 1 minutes; D  cGy cm2 TECHNIQUE: This procedure was performed by Dr. Yaniv Bueno. Informed consent was obtained after a detailed explanation of the procedure including risks, benefits, and alternatives. Universal protocol was observed. The right arm was prepped and draped in sterile fashion using maximum sterile barrier technique. Local anesthesia was achieved with lidocaine. A micropuncture needle was used to access the right brachial vein using ultrasound guidance. An ultrasound image demonstrating patency of the vein with needle tip located within it. An image was obtained and stored in PACs. A 0.018 guidewire was used to place a peel-a-way sheath and a 5 [place] dual-lumen PICC was advanced with fluoroscopic guidance with the tip at the cavo-atrial junction. The catheter flushed easily and there was a good blood return. The catheter was secured to the skin. The patient tolerated the procedure well and there were no immediate complications. EBL: Less than 3 mL FINDINGS: Fluoroscopic image demonstrates the tip of the catheter at the cavo-atrial junction.      Successful ultrasound and fluoroscopy guided PICC placement     Xr Chest Portable    Result Date: 12/10/2020 Xr Chest Portable    Result Date: 12/8/2020  EXAMINATION: ONE XRAY VIEW OF THE CHEST 12/8/2020 8:27 am COMPARISON: Chest radiograph performed 12/07/2020. HISTORY: ORDERING SYSTEM PROVIDED HISTORY: Pneumonia TECHNOLOGIST PROVIDED HISTORY: Pneumonia Reason for Exam: Pt would not cooperate with tech and she would not hold still. Acuity: Chronic Type of Exam: Ongoing FINDINGS: There are bilateral effusions with adjacent bibasilar consolidation. There is a vertical line running parallel to the lateral pleural surface of the right lung. Lung markings appear to extend beyond this towards the pleural surface and it is difficult to discern whether this represents a pneumothorax versus skin fold. The mediastinal structures are stable with stable cardiac leads presumably in the right atrium and right ventricle. The upper abdomen is unremarkable. The extrathoracic soft tissues are unremarkable. Bilateral effusions with adjacent bibasilar consolidation representing atelectasis versus pneumonia. Potential right-sided pneumothorax and short-term follow-up chest radiograph is recommended. Findings were discussed with Dr. Tina Molina at 9:05 am on 12/8/2020. Xr Chest Portable    Result Date: 12/7/2020  EXAMINATION: ONE XRAY VIEW OF THE CHEST 12/7/2020 12:04 pm COMPARISON: 12/03/2020 HISTORY: ORDERING SYSTEM PROVIDED HISTORY: worsening sob TECHNOLOGIST PROVIDED HISTORY: worsening sob Reason for Exam: PT CO cough with SOB X several days. Best images per pt condition and cooperation. Acuity: Chronic Type of Exam: Ongoing FINDINGS: Transvenous pacer remains in place. Increased pulmonary opacity right base. Increased left pleural effusion. Left basilar opacity. No pneumothorax.      Increased bibasilar opacities could represent atelectasis or pneumonia Increased left pleural effusion     Xr Chest Portable    Result Date: 12/3/2020 EXAMINATION: ONE XRAY VIEW OF THE CHEST 12/3/2020 5:17 am COMPARISON: Earlier same day. HISTORY: ORDERING SYSTEM PROVIDED HISTORY: post RIJ central line TECHNOLOGIST PROVIDED HISTORY: post RIJ central line Reason for Exam: Right central line placement. Acuity: Acute Type of Exam: Initial Additional signs and symptoms: Right central line placement. FINDINGS: Interval placement of a right IJ central venous catheter with tip in the mid SVC. No pneumothorax. Redemonstration of low left lung volume and hyperexpanded right lung volume. Similar appearing left greater than right basilar airspace disease with left basilar consolidation and right lateral basilar masslike opacity. Small left pleural effusion. Stable left pectoral trans venous dual-chamber cardiac pacer device. Stable cardiomediastinal silhouette and great vessels. Interval placement of a right IJ central venous catheter with tip in the mid SVC. No pneumothorax. Otherwise, no significant interval change with redemonstration of bilateral basilar airspace disease with left basilar consolidation and right lateral basilar masslike opacity. Continued attention on follow-up recommended. Small left pleural effusion.      Xr Chest Portable    Result Date: 12/3/2020 General: Skin is warm and dry. Findings: Erythema (Dorsal of foot, near the ankle.) present. No lesion. Comments: Sensitive to light touch, causes her pain. Neurological:      Mental Status: She is alert. Assessment:        Primary Problem  Hospital-acquired pneumonia    Active Hospital Problems    Diagnosis Date Noted    SURINDER (acute kidney injury) ([facility] [N17.9] 12/12/2020    Pacemaker [Z95.0] 12/05/2020    Bacteremia due to Staphylococcus [R78.81, B95.8]     [facility] ([facility] [T85.79XA, A41.9] 12/04/2020    Hypotension [I95.9] 12/03/2020    Hospital-acquired pneumonia [J18.9, Y95] 12/03/2020    Chronic obstructive pulmonary disease, unspecified ([facility] [J44.9] 05/24/2019    Acute respiratory failure (HCC) [J96.00] 05/18/2019    Anxiety disorder [F41.9]     Elevated C-reactive protein (CRP) [R79.82]     Urinary retention [R33.9]     MRSA colonization [Z22.322]     CVA (cerebral vascular accident) ([facility] [I63.9]        Plan:        Septic shock secondary to colonized indwelling line  -T 99.5, HR 106, RR 20, /77  -WBC 22.1, upward trend likely due to prednisone  -Levophed discontinued  -Blood cultures x2+ for staph capitis and staph hominis  -Per infectious disease  -continue IV Ancef, day 6 and IV Levaquin day 7, dc on 12/10  -On po doxycycline day 3    -PICC line for IV antibiotics upon discharge  -Vancomycin dc 12/7, 5 day treatment  -IV Ancef for MSSA bacteremia until 1/16/2020 and oral doxycycline for possible lung abscess until 12/30/2020  -Catheter to culture growing coagulase-negative staph  -Repeat blood cultures x2 negative for 6 days  -Infectious disease consulted, following recommendations  -Patient exhibited episodes of hypotension and tachycardia with ventricular rate of 135, per CNP note.  Was given to 250 mL bolus normal saline last night.     Cavitary right lower lobe lobe mass; infectious versus malignant etiology -CT chest without contrast 10/6/2020 displaying 4.6 x 4.4 x 3.5 cm cavitary right lower lobe mass  -Per pulmonology  -Follow-up CT chest in 4 to 6 weeks with possible lung biopsy at that time  -Pulmonology consulted following recommendations  -Appreciate ID recommendations concerning antibiotics     Acute on Chronic diastolic CHF with reduced ejection fraction  -Pro BNP shows 980, lower than past readings  -Echo (4/2019) shows LVEF 45%.  Evidence of diastolic dysfunction  -Echo (12/2020) shows LVEF>55%.  Pacemaker present in the right heart chambers.  Small LV cavity. -CXR (12/7) shows increased bibasilar opacities, could represent atelectasis or pneumonia.  Increased left pleural effusion. -CXR (12/10): No change to right lower lobe airspace disease, no change bilateral pleural effusion.  -Pro BNP 65,270  -Lasix 20mg given 1 dose 12/7, continue daily 20mg IV  -Given 1 extra dose of lasix 20mg yesterday, 12/10  -Lasix held on 12/12 due to hypotension     SURINDER  -Creatinine 0.93, BUN 6. Creatinine baseline <0.40  -Creatinine today 1.03. Trend upward  -Not on IV fluids due to fluid overload  -Continue to monitor Creatinine     Possible GI bleed vs anemia of chronic disease  Hemoglobin 7.6, downward trend from 9.5  Iron studies shows anemia of chronic disease  Oral iron on board  Drop to 6.8 from 8.2 on 12/12  1u pRBC given and 500 NS bolus  Will recheck hemoglobin     Gout  XR of right ankle and foot shows no acute bony or joint abnormalities, mild to osteopenia present.   Prednisone 20 mg daily for 7 doses, on 4th dose today.     Sedation secondary to polypharmacy (improving)  -Patient arousable but complains of fatigue  -Noted that nightly medications include trazodone, mirtazapine, metoprolol, baclofen, Norco, and Depakote  -Trazodone decreased from 100 mg per night to 50 mg per night yesterday 12/7     COPD  -Continue home medication Symbicort BID daily and DuoNeb PRN     Hypokalemia (improving) -Potassium 4.7 (12/12)  -Potassium replacement on board, will continue to monitor    Mandy Santos MD  12/12/2020  8:20 AM     Attestation and add on       I have discussed the care of Janessa Chua , including pertinent history and exam findings,      12/12/20    with the resident. I have seen and examined the patient and the key elements of all parts of the encounter have been performed by me . I agree with the assessment, plan and orders as documented by the resident. Principal Problem:    Hospital-acquired pneumonia  Active Problems:    CVA (cerebral vascular accident) ([facility]    Septic shock ([facility]    MRSA colonization    Urinary retention    Elevated C-reactive protein (CRP)    Centrilobular emphysema (HCC)    Anxiety disorder    Acute respiratory failure (HCC)    Chronic obstructive pulmonary disease, unspecified (HCC)    Line sepsis (HCC)    Pacemaker    Bacteremia due to Staphylococcus    SURINDER (acute kidney injury) ([facility] [address]  Resolved Problems:    * No resolved hospital problems. *       .  Patient is ill and high risk  Admitted with septic shock secondary to related to MRSA line sepsis  Has SURINDER which is slowly improving  Patient has acute anemia hemoglobin dropped from 8 to six-point something  Has other multiple issues as described above     -SURINDER  -Creatinine 0.93, BUN 6. Creatinine baseline <0.40  -Creatinine today 1.03. Trend upward  -Not on IV fluids due to fluid overload  -Continue to monitor Creatinine     Possible GI bleed vs anemia of chronic disease  Hemoglobin 7.6, downward trend from 9.5  Iron studies shows anemia of chronic disease  Oral iron on board  Drop to 6.8 from 8.2 on 12/12  1u pRBC given and 500 NS bolus  Will recheck hemoglobin  --- ;     Patient was seen and examined with Dr. Lavelle Roper. Patient was admitted here and was being treated for septic shock secondary to MRSA line sepsis patient has been improving .   However yesterday her condition deteriorated further PRN Meds: promethazine (PHENERGAN) in sodium chloride 0.9% IVPB, HYDROcodone 5 mg - acetaminophen, perflutren lipid microspheres, sodium chloride flush, ipratropium-albuterol, sodium chloride flush, potassium chloride **OR** potassium alternative oral replacement **OR** potassium chloride, magnesium sulfate, acetaminophen **OR** acetaminophen, polyethylene glycol, promethazine **OR** ondansetron        [address], [address] [place] [address].    Phone [phone]   Fax: [phone]  Answering Service: [phone]

## 2020-12-13 NOTE — SEDATION DOCUMENTATION
Pt found to be incoherent with high respiratory rate, tachycardia. In spite of multiple attempts with different equipment, pulse ox was not obtainable. Last reading captured was 80% with poor waveform on bipap with 100% oxygen. Blood pressures in the 70s. Residents at bedside to evaluate and Dr. Ford Hope spoke with Dr. Cherry Vázquez over the phone with a determination to intubate.

## 2020-12-13 NOTE — PROGRESS NOTES
[address] [facility].    PROGRESS NOTE             12/13/2020    8:31 AM    Name:   Rohan Leavitt  MRN:     [id number]     Acct:      [de-identified]   Room:   2008/2008-01  IP Day:  8  Admit Date:  12/3/2020  1:20 AM    PCP:  Ren Chatterjee MD  Code Status:  Full Code    Subjective:     C/C:   Chief Complaint   Patient presents with    Fatigue     Interval History Status: worsened. Patient seen and examined at bedside this morning. There are no acute changes overnight. Patient continues to require positive airway pressure by 45% FiO2 and has been requiring IV phenylephrine to maintain blood pressure. Patient continues to be treated for sepsis secondary to hospital-acquired pneumonia. Brief History:     18-year-old female presenting from extended care facility with hypotension. Initially treated for staph epidermidis sepsis secondary to indwelling subcutaneous catheters in abdomen. Right-sided pulmonary cavitary lesion found on CT. patient developed signs and symptoms of sepsis during her stay and required transportation to the ICU for treatment of hospital-acquired pneumonia. Review of Systems:     Review of Systems   Constitutional: Positive for fatigue. Negative for chills, diaphoresis and fever. HENT: Negative for sore throat and trouble swallowing. Respiratory: Positive for cough and shortness of breath. Negative for choking. Cardiovascular: Positive for leg swelling (patient continues to complain of severe right  leg pain). Negative for chest pain. Gastrointestinal: Negative for abdominal pain, diarrhea, nausea and vomiting. Genitourinary: Negative for dysuria. Musculoskeletal: Positive for arthralgias and myalgias. Skin: Negative for rash and wound. Neurological: Negative for dizziness, numbness and headaches. Medications: Allergies:     Allergies   Allergen Reactions  Penicillins Shortness Of Breath and Rash    Amoxicillin        Current Meds:   Scheduled Meds:    sodium chloride  500 mL Intravenous Once    pantoprazole  40 mg Intravenous Daily    And    sodium chloride (PF)  10 mL Intravenous Daily    sodium chloride flush  10 mL Intravenous 2 times per day    megestrol  200 mg Oral Daily    potassium chloride  40 mEq Oral BID WC    ferrous sulfate  325 mg Oral BID WC    doxycycline monohydrate  100 mg Oral 2 times per day    predniSONE  20 mg Oral Daily    [Held by provider] furosemide  20 mg Intravenous Daily    traZODone  50 mg Oral Nightly    ceFAZolin  2 g Intravenous Q8H    [Held by provider] metoprolol tartrate  50 mg Oral BID    budesonide-formoterol  2 puff Inhalation BID    divalproex  125 mg Oral BID    sertraline  50 mg Oral Daily    mirtazapine  7.5 mg Oral Nightly    levothyroxine  125 mcg Oral Daily    baclofen  10 mg Oral BID    sodium chloride flush  10 mL Intravenous 2 times per day    [Held by provider] enoxaparin  40 mg Subcutaneous Daily     Continuous Infusions:    phenylephrine (JAIR-SYNEPHRINE) 50mg/250mL infusion 100 mcg/min (12/13/20 0813)    sodium bicarbonate infusion 100 mL/hr at 12/13/20 0718     PRN Meds: morphine, sodium chloride flush, promethazine (PHENERGAN) in sodium chloride 0.9% IVPB, HYDROcodone 5 mg - acetaminophen, perflutren lipid microspheres, sodium chloride flush, ipratropium-albuterol, sodium chloride flush, potassium chloride **OR** potassium alternative oral replacement **OR** potassium chloride, magnesium sulfate, acetaminophen **OR** acetaminophen, polyethylene glycol, promethazine **OR** ondansetron    Data:     Past Medical History:   has a past medical history of Abnormal computed tomography of cervical spine, CVA (cerebral vascular accident) ([facility] [address] GERD (gastroesophageal reflux disease), Hypertension, Paraproteinemia, and Weight loss. Social History:   reports that she has quit smoking. She has a 30.00 pack-year smoking history. She has never used smokeless tobacco. She reports previous alcohol use of about 28.0 standard drinks of alcohol per week. She reports that she does not use drugs. Family History: History reviewed. No pertinent family history. Vitals:  BP (!) 87/54   Pulse 100   Temp 99.6 °F (37.6 °C) (Axillary)   Resp 23   Ht 5' (1.524 m)   Wt 140 lb 6.4 oz (63.7 kg)   SpO2 98%   BMI 27.42 kg/m²   Temp (24hrs), Av.5 °F (37.5 °C), Min:98.7 °F (37.1 °C), Max:100.2 °F (37.9 °C)    No results for input(s): POCGLU in the last 72 hours. I/O(24Hr):     Intake/Output Summary (Last 24 hours) at 2020 0831  Last data filed at 2020 0400  Gross per 24 hour   Intake 1172.67 ml   Output 350 ml   Net 822.67 ml       Labs:    [unfilled]    Lab Results   Component Value Date/Time    SPECIAL 7ml right hand 2020 08:10 PM     Lab Results   Component Value Date/Time    CULTURE NO GROWTH 7 HOURS 2020 08:10 PM       @ART@    Radiology:    Echo Complete 2d W Doppler W Color    Result Date: 2020 160[address] [facility] Transthoracic Echocardiography Report (TTE)  Patient Name [address]     Date of Study                 12/04/2020               Allan Haro   Date of      1950  Gender                        Female  Birth   Age          65 year(s)  Race                             Room Number  2098        Height:                       59.84 inch, 152 cm   Corporate ID [id number]    Weight:                       128 pounds, 58 kg  #   Patient Acct [de-identified]   BSA:           1.54 m^2       BMI:      25.11  #                                                                kg/m^2   MR #         [id number]      Sonographer                   [email or web address]   Accession #  [id number]  Interpreting Physician        Valeria Meeks   Fellow                   Referring Nurse Practitioner   Interpreting             Referring Physician           Garth Keys *  Fellow  Type of Study   TTE procedure:2D Echocardiogram, M-Mode, Doppler, Color Doppler. Procedure Date Date: 12/04/2020 Start: 11:01 AM Study Location: [address] [place] [address] Technical Quality: Fair visualization Indications:Positive blood cultures. History / Tech. Comments: COPD CVA PACER Patient Status: Inpatient Height: 59.84 inches Weight: 127.88 pounds BSA: 1.54 m^2 BMI: 25.11 kg/m^2 Rhythm: Within normal limits HR: 85 bpm BP: 143/58 mmHg CONCLUSIONS Summary Small LV cavity. Normal left ventricular ejection fraction (>55%). Normal wall motions. Mild-moderate left ventricular hypertrophy. Normal right ventricle size and function. Pacemaker lead seen in right heart chambers. No obvious vegetation seen. Aortic valve is trileaflet. No aortic insufficiency. Normal mitral valve structure and function. Mild mitral regurgitation. Normal tricuspid valve structure and function. Mild tricuspid regurgitation. Estimated right ventricular systolic pressure is 42 mmHg. Mildly elevated right ventricular systolic pressure. Pulmonic valve not well visualized.  No pulmonic insufficiency. No obvious valvular vegetation visualized on this study. Trivial pericardial effusion. Signature ----------------------------------------------------------------------------  Electronically signed by Zoila Torres(Sonographer) on 2020 01:01  PM ---------------------------------------------------------------------------- ----------------------------------------------------------------------------  Electronically signed by J Luis Marrero(Interpreting physician) on  2020 02:55 PM ---------------------------------------------------------------------------- FINDINGS Left Atrium Left atrium is normal in size. Left Ventricle Small LV cavity. Normal left ventricular ejection fraction (>55%). Normal wall motions. Mild-moderate left ventricular hypertrophy. Right Atrium Pacing lead seen in the right atrium. Right atrium is normal size . Right Ventricle Pacemaker lead seen in right ventricle. Normal right ventricle size and function. Mitral Valve Normal mitral valve structure and function. Mild mitral regurgitation. Aortic Valve Aortic valve is trileaflet. No aortic insufficiency. No aortic stenosis. Tricuspid Valve Normal tricuspid valve structure and function. Mild tricuspid regurgitation. Estimated right ventricular systolic pressure is 42 mmHg. Mildly elevated right ventricular systolic pressure. Pulmonic Valve Pulmonic valve not well visualized. No pulmonic insufficiency. Pericardial Effusion Trivial pericardial effusion. Miscellaneous Normal aortic root dimension. E/e' average 12.5 IVC normal diameter & inspiratory collapse indicating normal RA filling pressure .  M-mode / 2D Measurements & Calculations:   LVIDd:3.33 cm(3.7 - 5.6 cm)      Diastolic PPDWAU:49.0 ml  ZYQFT:5.66 cm(2.2 - 4.0 cm)      Systolic HAQBVO:60.1 ml  MAZZ:6.29 cm(0.6 - 1.1 cm)       Aortic Root:3.6 cm(2.0 - 3.7 cm)  LVPWd:1.32 cm(0.6 - 1.1 cm)      LA Dimension: 2.01 cm(1.9 - 4.0 cm)  Fractional Shortenin.34 %    LA volume/Index: 17.2 ml /11m^2  Calculated LVEF (%): 73.35 %     LVOT:2.4 cm   Mitral:                                Aortic   Peak E-Wave: 0.77 m/s                  Peak Velocity: 1.75 m/s  Peak A-Wave: 1.09 m/s                  Mean Velocity: 1.21 m/s  E/A Ratio: 0.7                         Peak Gradient: 12.25 mmHg  Peak Gradient: 2.35 mmHg               Mean Gradient: 7 mmHg  Mean Gradient: 3 mmHg  Deceleration Time: 123 msec                                         Area (continuity): 3.9 cm^2                                         AV VTI: 29.6 cm   Area (continuity): 4.4 cm^2  Mean Velocity: 0.73 m/s   Tricuspid:                             Pulmonic:   Estimated RVSP: 42 mmHg  Peak TR Velocity: 3.12 m/s  Peak TR Gradient: 38.9376 mmHg  Estimated RA Pressure: 3 mmHg                                         Estimated PASP: 41.94 mmHg  Septal Wall E' velocity:0.06 m/s Lateral Wall E' velocity:0.07 m/s    Xr Ankle Right (2 Views)    Result Date: 12/9/2020  EXAMINATION: 3 XRAY VIEWS OF THE RIGHT FOOT; 3 XRAY VIEWS OF THE RIGHT ANKLE 12/9/2020 1:30 pm COMPARISON: None. HISTORY: ORDERING SYSTEM PROVIDED HISTORY: foot pain TECHNOLOGIST PROVIDED HISTORY: foot pain Reason for Exam: foot pain. best images per pt condition Acuity: Acute Type of Exam: Initial; ORDERING SYSTEM PROVIDED HISTORY: foot pain TECHNOLOGIST PROVIDED HISTORY: foot pain Reason for Exam: foot pain. best images per pt condition FINDINGS: Right foot: Mottled osteopenia. Anatomic alignment. No acute fracture. The joint spaces appear normal. Left ankle: Anatomic alignment. No fracture. Mottled osteopenia. 1. No acute bony or joint abnormality 2.  Mottled osteopenia     Xr Foot Right (2 Views)    Result Date: 12/9/2020 EXAMINATION: 3 XRAY VIEWS OF THE RIGHT FOOT; 3 XRAY VIEWS OF THE RIGHT ANKLE 12/9/2020 1:30 pm COMPARISON: None. HISTORY: ORDERING SYSTEM PROVIDED HISTORY: foot pain TECHNOLOGIST PROVIDED HISTORY: foot pain Reason for Exam: foot pain. best images per pt condition Acuity: Acute Type of Exam: Initial; ORDERING SYSTEM PROVIDED HISTORY: foot pain TECHNOLOGIST PROVIDED HISTORY: foot pain Reason for Exam: foot pain. best images per pt condition FINDINGS: Right foot: Mottled osteopenia. Anatomic alignment. No acute fracture. The joint spaces appear normal. Left ankle: Anatomic alignment. No fracture. Mottled osteopenia. 1. No acute bony or joint abnormality 2. Mottled osteopenia     Xr Acute Abd Series Chest 1 Vw    Result Date: 12/12/2020  EXAMINATION: TWO XRAY VIEWS OF THE ABDOMEN AND SINGLE  XRAY VIEW OF THE CHEST 12/12/2020 5:02 pm COMPARISON: May 28, 2019 HISTORY: ORDERING SYSTEM PROVIDED HISTORY: r/o SBO TECHNOLOGIST PROVIDED HISTORY: r/o SBO Reason for Exam: Irregular heart rate poss SBO Acuity: Acute Type of Exam: Initial Additional signs and symptoms: Irregular heart rate poss SBO FINDINGS: Bipolar pacer on the left. Right PICC line unchanged. Bibasilar airspace disease. Mild edema. No subdiaphragmatic air. Small effusions bilaterally. Gas-filled loops of small bowel. Retained oral contrast versus calcific lesion in the pelvis. Tran catheter in the bladder. Diffuse demineralization. Ileus. Bibasilar airspace disease.      Ct Chest Wo Contrast    Result Date: 12/6/2020 EXAMINATION: CT OF THE CHEST WITHOUT CONTRAST 12/4/2020 3:08 pm TECHNIQUE: CT of the chest was performed without the administration of intravenous contrast. Multiplanar reformatted images are provided for review. Dose modulation, iterative reconstruction, and/or weight based adjustment of the mA/kV was utilized to reduce the radiation dose to as low as reasonably achievable. COMPARISON: Chest radiograph 12/03/2020, CT abdomen and pelvis 12/03/2020. HISTORY: ORDERING SYSTEM PROVIDED HISTORY: pneumonia TECHNOLOGIST PROVIDED HISTORY: pneumonia Reason for Exam: f/u pneumonia Acuity: Unknown Type of Exam: Unknown FINDINGS: Mediastinum: Suspected 17 mm lymph node posterior to the left medial clavicle appears to have increased in size from a 2012 chest CT. No other definite lymphadenopathy. No pericardial effusion. Coronary artery calcifications are seen. The thoracic aorta is nonaneurysmal.  The main pulmonary artery is not significantly dilated. Right IJ CVC catheter tip is in the SVC. There is a left chest wall cardiac device with multiple leads. Lungs/pleura: The central airways are patent. Small bilateral pleural effusions. No pneumothorax is seen. Emphysematous changes are seen in the lungs. Similar appearance to the CT abdomen and pelvis from 12/03/2020, there is a cavitary mass in the right lower lobe abutting the pleura, measuring 4.6 x 4.4 x 3.5 cm. There is dependent atelectasis in the right lung. There is a rounded opacity in the left lower lobe broadly abutting the pleura. Additional airspace opacities seen in the left lower lobe. There are also areas of atelectasis in the left lingula and left lower lobe. 3 mm right upper lobe pulmonary nodule (series 601, image 60). Upper Abdomen: Limited images of the upper abdomen are unremarkable. 4 mm hypodensity in the liver is too small to characterize but also seen on the comparison abdomen CT (series 2, image 111).  Soft Tissues/Bones: No acute bony abnormality. There are old left-sided rib fractures. Approximately 4.6 x 4.4 x 3.5 cm cavitary right lower lobe mass, similar to the recent abdomen and pelvis CT from 12/03/2020. This is new from a CT dated 07/25/2019. This could represent a cavitary pneumonia however a primary lung neoplasm is not excluded. Recommend short-term follow-up CT after treatment to demonstrate interval change. Rounded opacity in the left lower lobe broadly abutting the pleura could represent rounded atelectasis or infection. Additional airspace opacities in the left lower lobe are suspicious for infection. This area can also be followed up with CT. Small bilateral pleural effusions. 3 mm right upper lobe pulmonary nodule. Follow-up recommendations are below. Suspected enlarged lymph node in the left upper mediastinum posterior to the medial left clavicle, nonspecific. RECOMMENDATIONS: Fleischner Society guidelines for follow-up and management of incidentally detected pulmonary nodules: Nodule size less than 6 mm In a low-risk patient, no routine follow-up. In a high-risk patient, optional CT at 12 months. - Low risk patients include individuals with minimal or absent history of smoking and other known risk factors. - High risk patients include individuals with a history or smoking or known risk factors. Radiology 2017 http://pubs. rsna.org/doi/full/10.1148/radiol. 2678459752     Ct Abdomen Pelvis W Iv Contrast Additional Contrast? None    Result Date: 12/3/2020 EXAMINATION: CT OF THE ABDOMEN AND PELVIS WITH CONTRAST 12/3/2020 2:49 am TECHNIQUE: CT of the abdomen and pelvis was performed with the administration of intravenous contrast. Multiplanar reformatted images are provided for review. Dose modulation, iterative reconstruction, and/or weight based adjustment of the mA/kV was utilized to reduce the radiation dose to as low as reasonably achievable. COMPARISON: August 13, 2019. HISTORY: ORDERING SYSTEM PROVIDED HISTORY: global abdominal pain TECHNOLOGIST PROVIDED HISTORY: global abdominal pain Reason for Exam: Fatigue, abd pain. Due to patient condition, unable to have patient lay flat  or bring arms above head Acuity: Acute Type of Exam: Initial Relevant Medical/Surgical History: History, Gtube/peg tube placement, cholecystectomy FINDINGS: Lower Chest: Partially visualized left lower lobe heterogeneous consolidation. Trace left pleural fluid with pleural thickening. Right lower lobe 4.2 x 3.3 cm masslike opacity with central necrosis, central air-fluid level and mild surrounding heterogeneous/nodular opacities. Emphysema. Partially visualized cardiac pacer leads. Atherosclerosis of the visualized thoracic aorta. Liver: Few scattered subcentimeter hypodensities in the liver are similar to the prior study and likely represent benign cysts. No new suspicious liver mass. Smooth liver contour. Gallbladder and Bile Ducts: Prior cholecystectomy. Spleen: Normal. Adrenal Glands: Normal. Pancreas: Normal. Genitourinary: Normal. Bowel: The stomach is incompletely distended and not well evaluated. Postsurgical changes of the bowel. Normal caliber bowel. Fluid throughout the colon. Vasculature: Atherosclerosis. No abdominal aortic aneurysm. Patent main portal vein. Bones and Soft Tissues: Osteopenia. Degenerative changes in the visualized spine and pelvis. L3 superior endplate 73-91% height loss was not present on the prior study but appears to be chronic. Retroperitoneum/Mesentery: No intraperitoneal free air, ascites or fluid collection. No lymphadenopathy in the abdomen or pelvis. Fluid throughout the colon suggests enteritis. No bowel obstruction. Postsurgical changes of the bowel. Partially visualized left lower lobe heterogeneous consolidation with trace left basilar pleural fluid and pleural thickening. There is also a cavitary 4.3 cm mass in the right lower lobe which demonstrates a small air-fluid level. Findings may relate to infection. Underlying neoplastic process is not excluded. Short-term follow-up chest CT may be helpful for further evaluation.      Kate Chahal Cables Device Plmt/replace/removal    Result Date: 12/8/2020 PROCEDURE: ULTRASOUND GUIDED VASCULAR ACCESS. FLUOROSCOPY GUIDED PICC PLACEMENT 12/8/2020. HISTORY: ORDERING SYSTEM PROVIDED HISTORY: Long term antibiotic therapy TECHNOLOGIST PROVIDED HISTORY: Long term antibiotic therapy Pneumonia SEDATION: None FLUOROSCOPY DOSE AND TYPE OR TIME AND EXPOSURES: 1 minutes; D  cGy cm2 TECHNIQUE: This procedure was performed by Dr. Debra Monique. Informed consent was obtained after a detailed explanation of the procedure including risks, benefits, and alternatives. Universal protocol was observed. The right arm was prepped and draped in sterile fashion using maximum sterile barrier technique. Local anesthesia was achieved with lidocaine. A micropuncture needle was used to access the right brachial vein using ultrasound guidance. An ultrasound image demonstrating patency of the vein with needle tip located within it. An image was obtained and stored in PACs. A 0.018 guidewire was used to place a peel-a-way sheath and a 5 [place]onese dual-lumen PICC was advanced with fluoroscopic guidance with the tip at the cavo-atrial junction. The catheter flushed easily and there was a good blood return. The catheter was secured to the skin. The patient tolerated the procedure well and there were no immediate complications. EBL: Less than 3 mL FINDINGS: Fluoroscopic image demonstrates the tip of the catheter at the cavo-atrial junction.      Successful ultrasound and fluoroscopy guided PICC placement     Xr Chest Portable    Result Date: 12/10/2020 EXAMINATION: ONE XRAY VIEW OF THE CHEST 12/10/2020 6:10 pm COMPARISON: December 8, 2020 HISTORY: ORDERING SYSTEM PROVIDED HISTORY: PNA; copious sputum TECHNOLOGIST PROVIDED HISTORY: PNA; copious sputum Reason for Exam: PNA; copious sputum Acuity: Acute Type of Exam: Subsequent/Follow-up Additional signs and symptoms: PNA; copious sputum FINDINGS: Bipolar pacer on the left unchanged. New PICC line on the right terminates 3 cm below the atrial caval junction. Right lower lobe airspace disease. Small bilateral pleural effusions. Heart and mediastinum normal.  Bony thorax intact. New PICC line on the right as above. No change right lower lobe airspace disease. No change bilateral pleural effusions. Xr Chest Portable    Result Date: 12/8/2020  EXAMINATION: ONE XRAY VIEW OF THE CHEST 12/8/2020 9:30 am COMPARISON: 8 December 2020 at 826 hours HISTORY: ORDERING SYSTEM PROVIDED HISTORY: r/o pneumothorax R-side. inconclusive on recent prior exam TECHNOLOGIST PROVIDED HISTORY: r/o pneumothorax R-side. inconclusive on recent prior exam Reason for Exam: This is the second xray taken of this patient in 1 hour. The patient would not cooperate on the previous xray and she did not cooperate on this xray. The patient talked and moved during the whole exam. Acuity: Unknown Type of Exam: Unknown FINDINGS: AP portable view of the chest time stamped at 951 hours demonstrates overlying cardiac monitoring electrodes. Heart size is stable. Bipolar pacemaker enters from the left with intact leads in appropriate positions. There is no change in bibasilar opacities and small bilateral effusions. No sizable extrapleural air. Small linear area of hypodensity is present peripherally in the right upper hemithorax which may be related to trace extrapleural air. No sizable pneumothorax. Linear lucency laterally in the right upper hemithorax suspicious for trace extrapleural air. No change in bibasilar opacities. Xr Chest Portable    Result Date: 12/8/2020  EXAMINATION: ONE XRAY VIEW OF THE CHEST 12/8/2020 8:27 am COMPARISON: Chest radiograph performed 12/07/2020. HISTORY: ORDERING SYSTEM PROVIDED HISTORY: Pneumonia TECHNOLOGIST PROVIDED HISTORY: Pneumonia Reason for Exam: Pt would not cooperate with tech and she would not hold still. Acuity: Chronic Type of Exam: Ongoing FINDINGS: There are bilateral effusions with adjacent bibasilar consolidation. There is a vertical line running parallel to the lateral pleural surface of the right lung. Lung markings appear to extend beyond this towards the pleural surface and it is difficult to discern whether this represents a pneumothorax versus skin fold. The mediastinal structures are stable with stable cardiac leads presumably in the right atrium and right ventricle. The upper abdomen is unremarkable. The extrathoracic soft tissues are unremarkable. Bilateral effusions with adjacent bibasilar consolidation representing atelectasis versus pneumonia. Potential right-sided pneumothorax and short-term follow-up chest radiograph is recommended. Findings were discussed with Dr. Suad Lyon at 9:05 am on 12/8/2020. Xr Chest Portable    Result Date: 12/7/2020  EXAMINATION: ONE XRAY VIEW OF THE CHEST 12/7/2020 12:04 pm COMPARISON: 12/03/2020 HISTORY: ORDERING SYSTEM PROVIDED HISTORY: worsening sob TECHNOLOGIST PROVIDED HISTORY: worsening sob Reason for Exam: PT CO cough with SOB X several days. Best images per pt condition and cooperation. Acuity: Chronic Type of Exam: Ongoing FINDINGS: Transvenous pacer remains in place. Increased pulmonary opacity right base. Increased left pleural effusion. Left basilar opacity. No pneumothorax.      Increased bibasilar opacities could represent atelectasis or pneumonia Increased left pleural effusion     Xr Chest Portable    Result Date: 12/3/2020 EXAMINATION: ONE XRAY VIEW OF THE CHEST 12/3/2020 5:17 am COMPARISON: Earlier same day. HISTORY: ORDERING SYSTEM PROVIDED HISTORY: post RIJ central line TECHNOLOGIST PROVIDED HISTORY: post RIJ central line Reason for Exam: Right central line placement. Acuity: Acute Type of Exam: Initial Additional signs and symptoms: Right central line placement. FINDINGS: Interval placement of a right IJ central venous catheter with tip in the mid SVC. No pneumothorax. Redemonstration of low left lung volume and hyperexpanded right lung volume. Similar appearing left greater than right basilar airspace disease with left basilar consolidation and right lateral basilar masslike opacity. Small left pleural effusion. Stable left pectoral trans venous dual-chamber cardiac pacer device. Stable cardiomediastinal silhouette and great vessels. Interval placement of a right IJ central venous catheter with tip in the mid SVC. No pneumothorax. Otherwise, no significant interval change with redemonstration of bilateral basilar airspace disease with left basilar consolidation and right lateral basilar masslike opacity. Continued attention on follow-up recommended. Small left pleural effusion.      Xr Chest Portable    Result Date: 12/3/2020 EXAMINATION: ONE XRAY VIEW OF THE CHEST 12/3/2020 2:20 am COMPARISON: May 29, 2019. HISTORY: ORDERING SYSTEM PROVIDED HISTORY: weakness TECHNOLOGIST PROVIDED HISTORY: weakness Reason for Exam: Pt c/o weakness, low blood pressure. Acuity: Acute Type of Exam: Initial Additional signs and symptoms: Pt c/o weakness, low blood pressure. FINDINGS: Frontal portable view of the chest.  Low left lung volume. Hyperexpanded right lung volume. Leftward shift of the mediastinal structures. Left greater than right basilar heterogeneous opacities. Small bilateral pleural effusions versus pleural thickening. No pneumothorax. Atherosclerotic thoracic aorta. No significant cardiomegaly. Left pectoral trans venous dual-chamber cardiac pacer device. Osteopenia. Multilevel degenerative disc disease. Left greater than right basilar heterogeneous opacities. Differential considerations include atelectasis/scarring, asymmetric pulmonary edema and an infectious/inflammatory process. Follow-up PA and lateral chest radiographs or chest CT may be helpful for further evaluation as warranted. Small bilateral pleural effusions versus pleural thickening. Low left lung volume with leftward shift of the mediastinal structures. Hyperexpanded right lung volume.      Fl Modified Barium Swallow W Video    Result Date: 12/7/2020 EXAMINATION: MODIFIED BARIUM SWALLOW WAS PERFORMED IN CONJUNCTION WITH SPEECH PATHOLOGY SERVICES TECHNIQUE: Fluoroscopic evaluation of the swallowing mechanism was performed with multiple consistency of barium product. FLUOROSCOPY DOSE AND TYPE OR TIME AND EXPOSURES: Fluoro time 2:08 min DAP 86.2 dGy cm2 AIR KERMA 16.6 mGy COMPARISON: None HISTORY: ORDERING SYSTEM PROVIDED HISTORY: r/o aspiration TECHNOLOGIST PROVIDED HISTORY: r/o aspiration Reason for Exam: r/o aspiration Acuity: Acute Type of Exam: Initial FINDINGS: Oral phase of swallowing was grossly within normal limits. Deep penetration noted with thin liquid consistency. Premature vallecular spillage with all consistencies. Prolonged mastication of regular solid consistency due to sparse dentition. No evidence of aspiration. Deep penetration with thin liquid consistency. Please see separate speech pathology report for full discussion of findings and recommendations. Physical Examination:        Physical Exam  Constitutional:       General: She is not in acute distress. Appearance: She is ill-appearing. She is not toxic-appearing. HENT:      Head: Normocephalic and atraumatic. Mouth/Throat:      Mouth: Mucous membranes are moist.      Comments: Patient has BiPAP attached  Eyes:      Extraocular Movements: Extraocular movements intact. Pupils: Pupils are equal, round, and reactive to light. Cardiovascular:      Rate and Rhythm: Tachycardia present. Pulses: Normal pulses. Heart sounds: Normal heart sounds. Pulmonary:      Effort: Pulmonary effort is normal. No respiratory distress. Breath sounds: Normal breath sounds. Abdominal:      General: Abdomen is flat. There is no distension. Palpations: Abdomen is soft. Tenderness: There is no abdominal tenderness. There is no guarding or rebound.    Genitourinary:     Comments: Tran catheter in place draining clear yellow urine  Musculoskeletal: Right lower leg: No edema. Left lower leg: No edema. Comments: Patient is significant edema in her left hand without evidence of edema in other extremities. Pneumatic compression stockings attached to the legs bilaterally   Skin:     Findings: No lesion or rash. Neurological:      General: No focal deficit present. Mental Status: She is alert.            Assessment:        Primary Problem  Hospital-acquired pneumonia    Active Hospital Problems    Diagnosis Date Noted    [facility]) [facility] [N17.9] 12/12/2020    Pacemaker [Z95.0] 12/05/2020    Bacteremia due to Staphylococcus [R78.81, B95.8]     Line sepsis ([facility] Ever Raymundo, A41.9] 12/04/2020    Hospital-acquired pneumonia [J18.9, Y95] 12/03/2020    Septic shock ([facility] [A41.9, R65.21] 12/03/2020    Chronic obstructive pulmonary disease, unspecified ([facility] [J44.9] 05/24/2019    Acute respiratory failure (HCC) [J96.00] 05/18/2019    Anxiety disorder [F41.9]     Centrilobular emphysema (HCC) [J43.2] 04/30/2019    Elevated C-reactive protein (CRP) [R79.82]     Urinary retention [R33.9]     MRSA colonization [Z22.322]     CVA (cerebral vascular accident) ([facility] [I63.9]        Plan:        Sepsis with acute hypoxic respiratory failure secondary to hospital-acquired pneumonia  -WBC 42.9, up from 22.1 yesterday  -Procalcitonin 0.51 yesterday  -Lactic acid 4.3, down from 5.8 yesterday  -ABG yesterday: pH 7.29, PCO2 27.8, PO2 50.3, HCO3 13.5 consistent with acute hypoxic respiratory failure  -Chest x-ray ordered for today  -CT chest without contrast 10/6/2020: Large cavitary right lower lobe mass  -Blood cultures x2 drawn no growth 7 hours  -Phenylephrine drip to maintain blood pressure  -Sodium bicarb 100 mL/h continuous  -Cefazolin 2 g IV every 8 hours discontinued  -Doxycycline 100 mg p.o. every 12 hours discontinued  -Cefepime 2 g IV every 12 hours  -Vancomycin pharmacy to dose Rapid response was called at ~5:30 pm. Per RN, patient was getting x-ray after which patient stated she did not feel well. Patient's vital reported as T98.8F, , RR 28, BP 70/31. Patient was evaluated at bedside. Patient was responsive. Patient received breathing treatment, then subsequently placed on BiPAP. Dr Sav Patterson, pulmonologist, was notified. Patient also received 500 cc NS bolus. Labs obtained, including ABG, CBC, BMP, troponin. At this time, patient's /97, 's, and sat'ing 89% on BiPAP. Patient is being transferred to ICU and plan to start phenylephrine gtt per Dr Sav Patterson with additional workup pending.     Electronically signed by Benita Veloz MD on 12/12/2020 at 7:23 PM     ---Critical care time spent 40 minutes- ;     Vitals:    12/13/20 1445 12/13/20 1500 12/13/20 1515 12/13/20 1520   BP: (!) 69/49 (!) 60/38  (!) 61/45   Pulse: 109 113 118 121   Resp: 23 24 28 23   Temp:       TempSrc:       SpO2: 97% 96% 97% 94%   Weight:       Height:            Patient is ill and symptomatic . --Patient was transferred to ICU yesterday after he had a rapid and found to be in septic shock  Dr. Sav Patterson is following the patient  Patient is on phenylephrine  Prognosis is guarded--    Condition    [x] ill ,     [x] high risk , [x] critical ,           [x] Iseptic---  [] delirium     [] -end organ failure ----,        [] debility---           [x] I-multiorgan insufficiency---             Medications: Allergies:     Allergies   Allergen Reactions    Penicillins Shortness Of Breath and Rash    Amoxicillin        Current Meds:   Scheduled Meds:    cefepime  2 g Intravenous Q12H    [START ON 12/14/2020] vancomycin  1,000 mg Intravenous Q24H    vancomycin (VANCOCIN) intermittent dosing (placeholder)   Other RX Placeholder    hydrocortisone sodium succinate PF  100 mg Intravenous Q8H    sodium chloride  500 mL Intravenous Once    pantoprazole  40 mg Intravenous Daily    And  sodium chloride (PF)  10 mL Intravenous Daily    sodium chloride flush  10 mL Intravenous 2 times per day    megestrol  200 mg Oral Daily    potassium chloride  40 mEq Oral BID WC    ferrous sulfate  325 mg Oral BID WC    [Held by provider] furosemide  20 mg Intravenous Daily    traZODone  50 mg Oral Nightly    [Held by provider] metoprolol tartrate  50 mg Oral BID    budesonide-formoterol  2 puff Inhalation BID    divalproex  125 mg Oral BID    sertraline  50 mg Oral Daily    mirtazapine  7.5 mg Oral Nightly    levothyroxine  125 mcg Oral Daily    baclofen  10 mg Oral BID    sodium chloride flush  10 mL Intravenous 2 times per day    [Held by provider] enoxaparin  40 mg Subcutaneous Daily     Continuous Infusions:    phenylephrine (JAIR-SYNEPHRINE) 50mg/250mL infusion 100 mcg/min (12/13/20 1515)    sodium bicarbonate infusion 100 mL/hr at 12/13/20 0718     PRN Meds: morphine, sodium chloride flush, promethazine (PHENERGAN) in sodium chloride 0.9% IVPB, HYDROcodone 5 mg - acetaminophen, perflutren lipid microspheres, sodium chloride flush, ipratropium-albuterol, sodium chloride flush, potassium chloride **OR** potassium alternative oral replacement **OR** potassium chloride, magnesium sulfate, acetaminophen **OR** acetaminophen, polyethylene glycol, promethazine **OR** ondansetron        [facility] [address], [address] [place] [address].    Phone [phone]   Fax: [phone]  Answering Service: [phone]

## 2020-12-13 NOTE — PROGRESS NOTES
Gastroenterology Consult Note      Patient: Madeleine Mixon  : 1950  Acct#:  [id number]     Date:  2020    Subjective:       History of Present Illness  Event with a low blood pressure noted  Patient moved to the ICU  Currently blood pressure is stable  Patient had severe elevation of her white count most likely she is in sepsis  No sign of bleeding whatsoever  Had a normal colored bowel movement  No nausea no vomiting  Hemoglobin stable at 9.1 without any need for transfusion      Past Medical History:   Diagnosis Date    Abnormal computed tomography of cervical spine     sclerotic bone appearance     CVA (cerebral vascular accident) ([address]     left  side weakness    GERD (gastroesophageal reflux disease)     Hypertension     Paraproteinemia     Weight loss       Past Surgical History:   Procedure Laterality Date    ABDOMEN SURGERY N/A 2019    ABDOMEN DEBRIDEMENT WOUND CLOSURE REOPENING OF RECENT LAPOROATOMY, ABDOMINAL WASHOUT, REPAIR FASCIAL DEHISENCE WITH MESH performed by Beulah Sigala DO at [facility] 5/15/2019    CHOLECYSTECTOMY performed by Beulah Sigala DO at [facility] 2019    GASTROSTOMY TUBE PLACEMENT performed by Beulah Sigala DO at [facility] [address] [place] [address] N/A 2019    EGD ESOPHAGOGASTRODUODENOSCOPY WITH REMOVAL OF PEG TUBE performed by Beulah Sigala DO at [address] [place] [address]  2019         LAPAROSCOPY N/A 5/15/2019    LAPAROSCOPY EXPLORATORY CONVERTED TO EXPLORATORY LAPAROTOMY/ RIGHT COLON RESECTION AND ANASTAMOSIS/ OPEN CHOLECYSTECTOMY/ EXTENSIVE LYSIS OF ADHESIONS performed by Beulah Sigala DO at [facility]  2011    Pacemaker is Medtronic Revo (compatible). Leads placed in  are NOT MRI compatible. Placed at [facility]. V's per Dr. Burton Service can not have an MRI.     UPPER GASTROINTESTINAL ENDOSCOPY N/A 2019 EGD ESOPHAGOGASTRODUODENOSCOPY @ BEDSIDE  ICU 2002 performed by Jason Hargrove MD at [address]      Past Endoscopic History as above I could not find the colonoscopy report she is talking about from the EGD as mentioned    Admission Meds  No current facility-administered medications on file prior to encounter. Current Outpatient Medications on File Prior to Encounter   Medication Sig Dispense Refill    baclofen (LIORESAL) 10 MG tablet Take 10 mg by mouth 2 times daily Give 0.5 tablet by mouth two times a day for spasms      HYDROcodone-acetaminophen (NORCO) 5-325 MG per tablet Take 1 tablet by mouth every 6 hours as needed for Pain.       dextromethorphan-quiNIDine (NUEDEXTA) 20-10 MG CAPS per capsule Take 1 capsule by mouth every 12 hours Give 1 capule by mouth every 12 hours for pseudobulbar affect      traZODone (DESYREL) 100 MG tablet Take 100 mg by mouth nightly For insomnia      divalproex (DEPAKOTE) 125 MG DR tablet Take 125 mg by mouth 2 times daily      ipratropium-albuterol (DUONEB) 0.5-2.5 (3) MG/3ML SOLN nebulizer solution Inhale 1 vial into the lungs every 8 hours as needed for Shortness of Breath      furosemide (LASIX) 20 MG tablet Take 20 mg by mouth daily       mirtazapine (REMERON) 7.5 MG tablet Take 7.5 mg by mouth nightly      ondansetron (ZOFRAN) 4 MG tablet Take 4 mg by mouth every 6 hours as needed for Nausea or Vomiting      sertraline (ZOLOFT) 25 MG tablet Take 50 mg by mouth daily      amiodarone (PACERONE) 100 MG tablet Take 1 tablet by mouth 2 times daily 30 tablet 3    metoprolol tartrate (LOPRESSOR) 50 MG tablet Take 1 tablet by mouth 2 times daily 60 tablet 3    levothyroxine (SYNTHROID) 75 MCG tablet Take 1 tablet by mouth Daily (Patient taking differently: Take 125 mcg by mouth Daily ) 30 tablet 3    budesonide-formoterol (SYMBICORT) 160-4.5 MCG/ACT AERO Inhale 2 puffs into the lungs 2 times daily Anxiety disorder    Acute respiratory failure (HCC)    Chronic obstructive pulmonary disease, unspecified (HCC)    Line sepsis (HCC)    Pacemaker    Bacteremia due to Staphylococcus    SURINDER (acute kidney injury) ([facility]  Resolved Problems:    * No resolved hospital problems. *    Vomiting  Drop in hemoglobin  Hypotension related to sepsis from UTI/pneumonia  History of small bowel obstruction  History of severe esophagitis    Recommendations:   H&H monitoring transfuse as needed  Anemia work-up showing normal iron saturation at 33%  Acute abdominal series showing ileus   But patient is tolerating liquid diet  PPI  Treat the other serious issues with dehydration and sepsis by other specialist                        Thank you for allowing me to participate in the care of your patient. Please feel free to contact me with any questions or concerns.      Ulices Carvajal MD

## 2020-12-13 NOTE — PROGRESS NOTES
Rapid response was called at ~5:30 pm. Per RN, patient was getting x-ray after which patient stated she did not feel well. Patient's vital reported as T98.8F, , RR 28, BP 70/31. Patient was evaluated at bedside. Patient was responsive. Patient received breathing treatment, then subsequently placed on BiPAP. Dr El Caldera, pulmonologist, was notified. Patient also received 500 cc NS bolus. Labs obtained, including ABG, CBC, BMP, troponin. At this time, patient's /97, 's, and sat'ing 89% on BiPAP. Patient is being transferred to ICU and plan to start phenylephrine gtt per Dr El Caldera with additional workup pending.     Electronically signed by Nancy Sterling MD on 12/12/2020 at 7:23 PM

## 2020-12-13 NOTE — PROGRESS NOTES
Bipap removed for med administration and sips of thickened water. Pt desats to 83-85% w/NC in place. Following med administration and oral care, bipap reapplied. O2 breaths given.

## 2020-12-13 NOTE — CONSULTS
[facility]  Emergency Medicine Note  Called to the floor for  Emergency Intervention     Patient Identification:  Carmen Burkitt is a 65 y.o. female. :  1950  MRN: [id number]     Acct: [de-identified]   Admit Date:  12/3/2020  Attending Provider: Jelani Omalley MD     Code Status:  Full Code                           Admission Diagnoses:   Hypotension [I95.9]    Current Problem List:   Patient Active Problem List   Diagnosis    Paraproteinemia    CVA (cerebral vascular accident) ([address]    Abnormal computed tomography of cervical spine    Weight loss    Functional gait abnormality    Left knee pain    Knee pain, left    Acute pain of left knee    Sepsis due to urinary tract infection ([address]    Cystitis    Emphysematous cystitis    Septic shock ([address]    Leukemoid reaction    Aspiration pneumonia of right lower lobe due to vomit ([address]    MRSA colonization    Urinary retention    Abdominal distention    Elevated CEA    Elevated CA 19-9 level    Cholecystitis    Elevated C-reactive protein (CRP)    Elevated procalcitonin    Bandemia    Centrilobular emphysema (HCC)    Hemorrhage of rectum and anus    GI bleed    Anemia    Small bowel obstruction (HCC)    Anxiety disorder    Noncompliance    Acute respiratory failure (HCC)    Chronic obstructive pulmonary disease, unspecified (HCC)    Bacteremia due to coagulase-negative Staphylococcus    Bradycardia    Fever    Abdominal wall abscess at site of surgical wound    Hypotension    Hospital-acquired pneumonia    Line sepsis ([address]    Pacemaker    Bacteremia due to Staphylococcus    SURINDER (acute kidney injury) ([address]       Reason for Being Called to the Bedside: The patient is a 65 y.o. female presents with complaints of respiratory distress.     Encounter Course: Was called to floor by internal medicine, patient currently hospitalized with pneumonia, reportedly was started on BiPAP yesterday, worsening respiratory distress today. Was informed that cannot get SPO2 greater than mid 80s while on BiPAP, need for intubation    See procedure note for intubation, remaining medical management was left to internal medicine    Procedures:        Intubation Procedure Note    Indication: Respiratory failure    Consent: Unable to be obtained due to patient's condition. Medications Used: succinycholine intravenously and etomidate 20 mg intravenously    Procedure: The patient was placed in the appropriate position. Cricoid pressure was not required. Intubation was performed glidescope a 7.5 cuffed endotracheal tube. The cuff was then inflated and the tube was secured appropriately at a distance of 22 cm at the lip. Initial confirmation of placement included bilateral breath sounds and an end tidal CO2 detector. A chest x-ray to verify correct placement of the tube has been ordered but is still pending. The patient tolerated the procedure well.      Complications: None            Attending Emergency Physician  [address] [place] [address] ICU

## 2020-12-13 NOTE — PROGRESS NOTES
Patient's friend, Richard Chilel updated on patient condition. She is listed on [facility] patient contacts. Patient friend was concerned that pacemaker has not been checked. Richard Chilel would like to be updated about patient condition at [phone].

## 2020-12-13 NOTE — PROGRESS NOTES
Dr. Shira Singh informed of Troponin lab result of 61. No new orders given. Dr. Shria Singh informed that patient refusing to take oral medications with nectar thickened liquids. New order for morphine for pain. Will continue to monitor and will address in the morning.

## 2020-12-13 NOTE — PROGRESS NOTES
Department of Nephrology         Progress note NOTE      REASON FOR CONSULTATION:  SURINDER     HISTORY OBTAIN FROM:  Patient     HISTORY OF PRESENT ILLNESS:      Patient is a 66 yo F with significant PMH of COPD, left-sided stroke, hospital acquired pneumonia, pacemaker, sepsis due to UTI, acute cystitis who has been admitted to [address] [place] [address] since 12/3 for management of pneumonia and hypotension. Patient resides at [facility], was hypotensive on admission and was given lovephed for one day. She had a 9 day ICU stay, is currently on Ancef and Monodox for pneumonia. Also had mild bilateral pleural effusions. Patient will be discharged home on Ancef for 5 weeks via PICC line insertion. Patient received vancomycin during initial admission, Cr has been consistently rising for the last few days. Cr is 0.93 today. Patient feels well overall, is fatigued however and falling asleep mid sentence. Complaining of right sided pain. Has a decreased appetite, on Megestrol.      Subjective/interval history    Patient was transferred to ICU yesterday with decompensation  Currently patient is not doing well patient is hypotensive systolic blood pressures in 50s heart rate 122 patient, in rapid response max labs on Kapil-Synephrine drip  Patient is responsive but very lethargic on BiPAP since morning  White cell count increased to 42,000  Serum creatinine 1.0 mg/dL  Discussed with the rapid response team in the room right now    PAST MEDICAL HISTORY:        Diagnosis Date    Abnormal computed tomography of cervical spine     sclerotic bone appearance     CVA (cerebral vascular accident) ([facility] [address]     left  side weakness    GERD (gastroesophageal reflux disease)     Hypertension     Paraproteinemia     Weight loss        PAST SURGICAL HISTORY:        Procedure Laterality Date    ABDOMEN SURGERY N/A 5/25/2019 ABDOMEN DEBRIDEMENT WOUND CLOSURE REOPENING OF RECENT LAPOROATOMY, ABDOMINAL WASHOUT, REPAIR FASCIAL DEHISENCE WITH MESH performed by Albaro Muñoz DO at [facility] 5/15/2019    CHOLECYSTECTOMY performed by Albaro Muñoz DO at [address] [facility] 2019    GASTROSTOMY TUBE PLACEMENT performed by Albaro Muñoz DO at [address] [facility] [address] [place] [address] N/A 2019    EGD ESOPHAGOGASTRODUODENOSCOPY WITH REMOVAL OF PEG TUBE performed by Albaro Muñoz DO at [address]  2019         LAPAROSCOPY N/A 5/15/2019    LAPAROSCOPY EXPLORATORY CONVERTED TO EXPLORATORY LAPAROTOMY/ RIGHT COLON RESECTION AND ANASTAMOSIS/ OPEN CHOLECYSTECTOMY/ EXTENSIVE LYSIS OF ADHESIONS performed by Albaro Muñoz DO at [place] [address]  2011    Pacemaker is Medtronic Revo (compatible). Leads placed in  are NOT MRI compatible. Placed at [facility]. V's per Dr. Siobhan Guardado can not have an MRI.  UPPER GASTROINTESTINAL ENDOSCOPY N/A 2019    EGD ESOPHAGOGASTRODUODENOSCOPY @ [facility]  ICU  performed by Margot Garcia MD at [facility]:  Medications Prior to Admission: baclofen (LIORESAL) 10 MG tablet, Take 10 mg by mouth 2 times daily Give 0.5 tablet by mouth two times a day for spasms  [] doxycycline hyclate (VIBRA-TABS) 100 MG tablet, Take 100 mg by mouth 2 times daily  HYDROcodone-acetaminophen (NORCO) 5-325 MG per tablet, Take 1 tablet by mouth every 6 hours as needed for Pain.   dextromethorphan-quiNIDine (NUEDEXTA) 20-10 MG CAPS per capsule, Take 1 capsule by mouth every 12 hours Give 1 capule by mouth every 12 hours for pseudobulbar affect  traZODone (DESYREL) 100 MG tablet, Take 100 mg by mouth nightly For insomnia  divalproex (DEPAKOTE) 125 MG DR tablet, Take 125 mg by mouth 2 times daily APTT: No results for input(s): APTT in the last 72 hours. Thyroid functions:   Lab Results   Component Value Date    TSH 0.93 09/16/2020      HgBA1c:    Lab Results   Component Value Date    LABA1C 5.3 04/12/2019     S. Calcium:  Recent Labs     12/13/20  0423   CALCIUM 7.8*     S. Ionized Calcium:No results for input(s): IONCA in the last 72 hours. S. Magnesium:  Recent Labs     12/12/20  1816   MG 2.3     S. Phosphorus:  Recent Labs     12/13/20  1238   PHOS 1.3*     S. Glucose:No results for input(s): POCGLU in the last 72 hours. BNP: No results for input(s): BNP in the last 72 hours. FASTING LIPID PANEL:  Lab Results   Component Value Date    CHOL 128 03/16/2020    HDL 30 (L) 03/16/2020    TRIG 99 03/16/2020     CARDIAC ENZYMES: No results for input(s): CKMB, CKMBINDEX, TROPONINI in the last 72 hours. Invalid input(s): CKTOTAL;3  AMYLASE/LIPASE/AMMONIA  No results for input(s): AMYLASE, LIPASE, AMMONIA in the last 72 hours. Urinalysis:   Lab Results   Component Value Date    NITRU NEGATIVE 12/11/2020    COLORU YELLOW 12/11/2020    PHUR 6.0 12/11/2020    WBCUA 0 TO 2 12/11/2020    RBCUA 0 TO 2 12/11/2020    MUCUS NOT REPORTED 12/11/2020    TRICHOMONAS NOT REPORTED 12/11/2020    YEAST NOT REPORTED 12/11/2020    BACTERIA FEW 12/11/2020    SPECGRAV 1.007 12/11/2020    LEUKOCYTESUR NEGATIVE 12/11/2020    UROBILINOGEN Normal 12/11/2020    BILIRUBINUR NEGATIVE 12/11/2020    BILIRUBINUR NEGATIVE 05/17/2012    GLUCOSEU NEGATIVE 12/11/2020    GLUCOSEU NEGATIVE 05/17/2012    KETUA NEGATIVE 12/11/2020    AMORPHOUS NOT REPORTED 12/11/2020             PHYSICAL EXAM:  BP 93/66   Pulse 114   Temp 97.9 °F (36.6 °C) (Axillary)   Resp 17   Ht 5' (1.524 m)   Wt 140 lb 6.4 oz (63.7 kg)   SpO2 90%   BMI 27.42 kg/m²      · General appearance: Lethargic but responsive on BiPAP  · HEENT: Head: Normocephalic, no lesions, without obvious abnormality.   · Lungs: Diminished breath sounds on BiPAP · Heart: Tachycardic regular rhythm  · Abdomen: soft, non-tender; bowel sounds normal; no masses,  no organomegaly  · Extremities: extremities normal, atraumatic, no cyanosis + edema  · Neurological: Lethargic responding and moving extremity  · Eye no icterus no redness      IMPRESSION  1. Acute kidney injury nonoliguric likely causes includes prerenal azotemia leading to ATN episode of hypotension noted, other causes could be cardiorenal syndrome vs. Vancomycin nephrotoxicity, patient does have urinary retention also. Serum creatinine increased from 0.4 mg/dL to 0.9 mg/dL--->  Patient is in shock with severe hypotension systolic blood pressure in 50s    Acute respiratory failure on BiPAP    2. Acute urinary retention     3. History of CHF with preserved EF, mild to moderate left ventricular hypertrophy diastolic dysfunction, proBNP increased to 19,000 from 980  Chest x-ray right lower lobe airspace disease small bilateral pleural effusion    4. CVA- left sided stroke     5. Hospital acquired pneumonia- currently on IV antibiotics       ASSESSMENT/PLAN:    Patient is on maxed out on 1 pressor, add another pressor to support blood pressure no objection on giving IV fluids right now  1.  Continue antibiotics as per ID  Continue IV fluids  Patient may need intubation      Niru Luo MD

## 2020-12-13 NOTE — PROGRESS NOTES
Patient arrived on unit at 1915 by bed on Bipap accompanied by 2 RNs and RT. Patient's vital signs were obtained and assessment completed. Patient introduced to room with call button within reach. Bed locked in lowest height setting with bed alarm on.

## 2020-12-13 NOTE — PROGRESS NOTES
Updated Pat Kristi Akosua wife, on patient condition. Andresangel Silvestre is listed as a contact on the paperwork from [facility]; also Ilsa Humphries is hospitalized currently.

## 2020-12-13 NOTE — PROGRESS NOTES
PULMONARY PROGRESS NOTE:    REASON FOR VISIT: pneumonia  Interval History:      On BIPAP; neosynephrine    Events since last visit: none; transferred to ICU due to resp distress, tachycardia - on BIPAP    PAST MEDICAL HISTORY:      Scheduled Meds:   cefepime  2 g Intravenous Q12H    vancomycin  1,500 mg Intravenous Once    [START ON 12/14/2020] vancomycin  1,000 mg Intravenous Q24H    vancomycin (VANCOCIN) intermittent dosing (placeholder)   Other RX Placeholder    sodium chloride  500 mL Intravenous Once    pantoprazole  40 mg Intravenous Daily    And    sodium chloride (PF)  10 mL Intravenous Daily    sodium chloride flush  10 mL Intravenous 2 times per day    megestrol  200 mg Oral Daily    potassium chloride  40 mEq Oral BID WC    ferrous sulfate  325 mg Oral BID WC    predniSONE  20 mg Oral Daily    [Held by provider] furosemide  20 mg Intravenous Daily    traZODone  50 mg Oral Nightly    [Held by provider] metoprolol tartrate  50 mg Oral BID    budesonide-formoterol  2 puff Inhalation BID    divalproex  125 mg Oral BID    sertraline  50 mg Oral Daily    mirtazapine  7.5 mg Oral Nightly    levothyroxine  125 mcg Oral Daily    baclofen  10 mg Oral BID    sodium chloride flush  10 mL Intravenous 2 times per day    [Held by provider] enoxaparin  40 mg Subcutaneous Daily     Continuous Infusions:   phenylephrine (JAIR-SYNEPHRINE) 50mg/250mL infusion 75 mcg/min (12/13/20 0935)    sodium bicarbonate infusion 100 mL/hr at 12/13/20 0718     PRN Meds:morphine, sodium chloride flush, promethazine (PHENERGAN) in sodium chloride 0.9% IVPB, HYDROcodone 5 mg - acetaminophen, perflutren lipid microspheres, sodium chloride flush, ipratropium-albuterol, sodium chloride flush, potassium chloride **OR** potassium alternative oral replacement **OR** potassium chloride, magnesium sulfate, acetaminophen **OR** acetaminophen, polyethylene glycol, promethazine **OR** ondansetron PHYSICAL EXAMINATION:  BP 96/75   Pulse 107   Temp 99.2 °F (37.3 °C) (Axillary)   Resp 18   Ht 5' (1.524 m)   Wt 140 lb 6.4 oz (63.7 kg)   SpO2 96%   BMI 27.42 kg/m²   afebrile  General : on BIPAP; neosynephrine  Neck  supple, no lymphadenopathy, JVD not raised  Heart  regular rhythm, S1 and S2 normal; no additional sounds heard  Lungs  Air Entry- fair bilaterally; breath sounds : vesicular,   Abdomen  soft, no tenderness  Upper Extremities  - no cyanosis, mottling, LUE lymphedema and contracted  Lower Extremities: no cyanosis, mottling; + edema / tight shiny skin    Current Laboratory, Radiologic, Microbiologic, and Diagnostic studies reviewed  Data ReviewCBC:   Recent Labs     12/12/20  0412 12/12/20  1256 12/12/20  1816 12/13/20  0423   WBC 22.1*  --  33.7* 42.9*   RBC 2.35*  --  3.23* 3.16*   HGB 6.8* 8.1* 9.1* 9.1*   HCT 20.7* 25.3* 30.1* 28.4*     --  358 442     BMP:   Recent Labs     12/12/20  0412 12/12/20  1816 12/13/20  0423   GLUCOSE 83 65* 110*    139 138   K 4.7  4.7 5.2 4.7   BUN 16 16 15   CREATININE 1.03* 1.17* 1.02*   CALCIUM 7.9* 8.1* 7.8*     ABGs:   Recent Labs     12/12/20  1754 12/12/20  2052   PHART 7.294* 7.368   PO2ART 50.3* 122.0*   DPQ8MUA 27.8* 28.3*   [id number] 13.5* 16.3*   [id number] 81.2* 97.5      PT/INR:  No results found for: PTINR    ASSESSMENT / PLAN:    Pneumonia - ABx  F/u CT chest 4-6 weeks; may need needle biopsy   sinus tachycardia/ anemia/ hypotension- GI consult/ monitor Hgb  Leucocytosis etio unclear- Dr Patricia Gonzales to address antibiotics - ?  Sepsis  Shock - fluids, pressors, hydrocortisone  ABx per ID    Electronically signed by Alison Padgett on 12/13/20 at 10:27 AM.

## 2020-12-13 NOTE — PROGRESS NOTES
Pharmacy Note  Vancomycin Consult    Judith Liner is a 65 y.o. female started on Vancomycin for Sepsis/Pneumonia; consult received from Dr. Judd Goff to manage therapy. Also receiving the following antibiotics: Cefepime.     Patient Active Problem List   Diagnosis    Paraproteinemia    CVA (cerebral vascular accident) ([facility] [address]    Abnormal computed tomography of cervical spine    Weight loss    Functional gait abnormality    Left knee pain    Knee pain, left    Acute pain of left knee    Sepsis due to urinary tract infection (HCC)    Cystitis    Emphysematous cystitis    Septic shock (HCC)    Leukemoid reaction    Aspiration pneumonia of right lower lobe due to vomit ([facility] [address]    MRSA colonization    Urinary retention    Abdominal distention    Elevated CEA    Elevated CA 19-9 level    Cholecystitis    Elevated C-reactive protein (CRP)    Elevated procalcitonin    Bandemia    Centrilobular emphysema (HCC)    Hemorrhage of rectum and anus    GI bleed    Anemia    Small bowel obstruction (HCC)    Anxiety disorder    Noncompliance    Acute respiratory failure (HCC)    Chronic obstructive pulmonary disease, unspecified (HCC)    Bacteremia due to coagulase-negative Staphylococcus    Bradycardia    Fever    Abdominal wall abscess at site of surgical wound    Hypotension    Hospital-acquired pneumonia    Line sepsis (HCC)    Pacemaker    Bacteremia due to Staphylococcus    SURINDER (acute kidney injury) ([facility] [address]       Allergies:  Penicillins and Amoxicillin     Temp max: 99.6    Recent Labs     12/12/20  1816 12/13/20  0423   BUN 16 15       Recent Labs     12/12/20  1816 12/13/20  0423   CREATININE 1.17* 1.02*       Recent Labs     12/12/20  1816 12/13/20  0423   WBC 33.7* 42.9*         Intake/Output Summary (Last 24 hours) at 12/13/2020 0931  Last data filed at 12/13/2020 0400  Gross per 24 hour   Intake 1172.67 ml   Output 350 ml   Net 822.67 ml       Culture Date      Source                       Results 12/12                   Blood x 2                    Pending    Ht Readings from Last 1 Encounters:   12/12/20 5' (1.524 m)        Wt Readings from Last 1 Encounters:   12/12/20 140 lb 6.4 oz (63.7 kg)         Body mass index is 27.42 kg/m². Estimated Creatinine Clearance: 42 mL/min (A) (based on SCr of 1.02 mg/dL (H)). Goal Trough Level: 15-20 mcg/mL    Assessment/Plan:  Will initiate Vancomycin with a one time loading dose of 1500 mg x1, followed by 1000 mg IV every 24 hours. Timing of trough level will be determined based on culture results, renal function, and clinical response. Thank you for the consult. Will continue to follow.    Valeria Garcia MS   12/13/2020  9:32 AM

## 2020-12-13 NOTE — PROGRESS NOTES
[facility] -   Infectious diseases evaluation  admission date 12/3/2020    reason for consultation:   Healthcare acquired pneumonia  Impression :   Current:  · New sepsis   · Healthcare acquired pneumonia with cavitary right lower lobe mass/positive mycoplasma IgM. ·  STAPHYLOCOCCUS HOMINIS oxacillin sensitive bacteremia  · Pacemaker in place  · Possible gout was started on prednisone  · Positive MRSA swab  · Possible pneumothorax  · Hypertension  · Urinary retention  · Chronic CHF  · History of ESBL and MRSA infection    Other:  · Penicillin allergy    Recommendations     · IV vancomycin and cefepime  · Follow culturs  · Follow CBC renal function  · Follow lactic acid  · CT chest without contrast  · Continue supportive care  · Discussed with nursing staff and Dr. Ciara Hein          History of Present Illness:   Initial history:  Madeleine Mixon is a 65y.o.-year-old female with history of CVA was brought to the ER from her nursing home for low blood pressure  for 1 day associated with generalized weakness. The patient had poor IV access and was not able to get IV fluids at the nursing home. The patient was placed on Levophed initially that was weaned off  12/2/2020 WBC 12.6, creatinine normal,  980, lactic acid 1.3, COVID-19 rapid test was negative. Blood cultures 12/3/2012 2 grew gram-positive cocci in cluster, procalcitonin 0.08, mycoplasma IgM 1.22, C-reactive protein 156, nasal swab for MRSA was positive, respiratory molecular panel with Covid PCR was negative  Chest x-ray showed the left greater than right basilar opacities  CT abdomen pelvis suggestive of enteritis,  lower lobe consolidation, cavitary 4.3 cm mass in the right lower lobe with a small air-fluid level.   Failed swallow study, diet was changed  Echocardiogram showed no obvious vegetation  PICC line placed 12/8/2020  Interval history 12/13/2020 Respiratory status got worse, placed on BiPAP, became hypotensive started on IV Kapil-Synephrine  She is short of breath, occasional cough, bilateral lower extremity swelling and generalized body ache  Tran catheter changed 12/11/2020 urinalysis unremarkable  WBC increased to 42  Procalcitonin 0.  5 and lactic acid 5.8 yesterday  Patient Vitals for the past 8 hrs:   BP Temp Temp src Pulse Resp SpO2   12/13/20 1625 93/66        12/13/20 1615    114 17 90 %   12/13/20 1600    110 21 92 %   12/13/20 1545 (!) 65/46   123 26 93 %   12/13/20 1530 (!) 61/42   120 25 95 %   12/13/20 1520 (!) 61/45   121 23 94 %   12/13/20 1515    118 28 97 %   12/13/20 1500 (!) 60/38   113 24 96 %   12/13/20 1445 (!) 69/49   109 23 97 %   12/13/20 1435     23    12/13/20 1430 107/60   114 17 97 %   12/13/20 1415 104/69   116 19 95 %   12/13/20 1400 95/69   113 17 96 %   12/13/20 1345    115 19 96 %   12/13/20 1330 94/69   115 19 97 %   12/13/20 1315 (!) 79/60   116 22 97 %   12/13/20 1300    112 20 100 %   12/13/20 1245 119/77   109 21 96 %   12/13/20 1230    110 20 96 %   12/13/20 1215    110 22 96 %   12/13/20 1200 101/82 97.9 °F (36.6 °C) Axillary 112 21 95 %   12/13/20 1152     20    12/13/20 1100    112 25 91 %   12/13/20 1045    108 21 95 %   12/13/20 1030 99/67   111 20 96 %   12/13/20 1015 117/71   107 20 96 %   12/13/20 1000    107 18 96 %   12/13/20 0945 96/75   108 20 96 %   12/13/20 0930 113/77   110 20 97 %   12/13/20 0915    107 16 96 %           I have personally reviewed the past medical history, past surgical history, medications, social history, and family history, and I haveupdated the database accordingly. Allergies:   Penicillins and Amoxicillin     Review of Systems:     Review of Systems  other than above 12 systems reviewed were negative  Physical Examination :       Physical Exam  Constitutional:       Comments:  On BiPAP   HENT: Head: Normocephalic and atraumatic. Right Ear: External ear normal.      Left Ear: External ear normal.      Mouth/Throat:      Pharynx: Oropharynx is clear. No posterior oropharyngeal erythema. Eyes:      General: No scleral icterus. Conjunctiva/sclera: Conjunctivae normal.   Neck:      Musculoskeletal: Neck supple. No neck rigidity. Cardiovascular:      Rate and Rhythm: Normal rate and regular rhythm. Heart sounds: No murmur. Pulmonary:      Effort: Pulmonary effort is normal.      Breath sounds: Normal breath sounds. No wheezing. Abdominal:      Palpations: Abdomen is soft. Tenderness: There is no abdominal tenderness. Musculoskeletal:      Right lower leg: No edema. Left lower leg: No edema. Skin:     General: Skin is warm. Coloration: Skin is not jaundiced. Neurological:      Mental Status: She is alert.       Comments: Oriented to person          Past Medical History:     Past Medical History:   Diagnosis Date    Abnormal computed tomography of cervical spine     sclerotic bone appearance     CVA (cerebral vascular accident) ([address]     left  side weakness    GERD (gastroesophageal reflux disease)     Hypertension     Paraproteinemia     Weight loss        Past Surgical  History:     Past Surgical History:   Procedure Laterality Date    ABDOMEN SURGERY N/A 5/25/2019    ABDOMEN DEBRIDEMENT WOUND CLOSURE REOPENING OF RECENT LAPOROATOMY, ABDOMINAL WASHOUT, REPAIR FASCIAL DEHISENCE WITH MESH performed by Ned Page DO at [facility] 5/15/2019    CHOLECYSTECTOMY performed by Ned Page DO at [facility] 5/25/2019    GASTROSTOMY TUBE PLACEMENT performed by Ned Page DO at [facility] [address] [place] [address] N/A 7/27/2019    EGD ESOPHAGOGASTRODUODENOSCOPY WITH REMOVAL OF PEG TUBE performed by Ned Page DO at [address] [place] [address]  4/14/2019         LAPAROSCOPY N/A 5/15/2019 LAPAROSCOPY EXPLORATORY CONVERTED TO EXPLORATORY LAPAROTOMY/ RIGHT COLON RESECTION AND ANASTAMOSIS/ OPEN CHOLECYSTECTOMY/ EXTENSIVE LYSIS OF ADHESIONS performed by Jonna Perez DO at [facility] 10  07/2011    Pacemaker is Medtronic Revo (compatible). Leads placed in 1995 are NOT MRI compatible. Placed at [facility]. V's per Dr. Tyrone Juarez can not have an MRI.  UPPER GASTROINTESTINAL ENDOSCOPY N/A 4/16/2019    EGD ESOPHAGOGASTRODUODENOSCOPY @ [facility]  ICU 2002 performed by Vahe Flores MD at [address] [place] [address] OR       Medications:      cefepime  2 g Intravenous Q12H    [START ON 12/14/2020] vancomycin  1,000 mg Intravenous Q24H    vancomycin (VANCOCIN) intermittent dosing (placeholder)   Other RX Placeholder    hydrocortisone sodium succinate PF  100 mg Intravenous Q8H    sodium chloride  500 mL Intravenous Once    pantoprazole  40 mg Intravenous Daily    And    sodium chloride (PF)  10 mL Intravenous Daily    sodium chloride flush  10 mL Intravenous 2 times per day    megestrol  200 mg Oral Daily    potassium chloride  40 mEq Oral BID WC    ferrous sulfate  325 mg Oral BID WC    [Held by provider] furosemide  20 mg Intravenous Daily    traZODone  50 mg Oral Nightly    [Held by provider] metoprolol tartrate  50 mg Oral BID    budesonide-formoterol  2 puff Inhalation BID    divalproex  125 mg Oral BID    sertraline  50 mg Oral Daily    mirtazapine  7.5 mg Oral Nightly    levothyroxine  125 mcg Oral Daily    baclofen  10 mg Oral BID    sodium chloride flush  10 mL Intravenous 2 times per day    [Held by provider] enoxaparin  40 mg Subcutaneous Daily       Social History:     Social History     Socioeconomic History    Marital status:       Spouse name: Not on file    Number of children: Not on file    Years of education: Not on file    Highest education level: Not on file   Occupational History    Not on file   Social Needs    Financial resource strain: Not on file  Food insecurity     Worry: Not on file     Inability: Not on file    Transportation needs     Medical: Not on file     Non-medical: Not on file   Tobacco Use    Smoking status: Former Smoker     Packs/day: 1.00     Years: 30.00     Pack years: 30.00    Smokeless tobacco: Never Used   Substance and Sexual Activity    Alcohol use: Not Currently     Alcohol/week: 28.0 standard drinks     Types: 28 Glasses of wine per week    Drug use: No    Sexual activity: Not on file   Lifestyle    Physical activity     Days per week: Not on file     Minutes per session: Not on file    Stress: Not on file   Relationships    Social connections     Talks on phone: Not on file     Gets together: Not on file     Attends Latter-day service: Not on file     Active member of club or organization: Not on file     Attends meetings of clubs or organizations: Not on file     Relationship status: Not on file    Intimate partner violence     Fear of current or ex partner: Not on file     Emotionally abused: Not on file     Physically abused: Not on file     Forced sexual activity: Not on file   Other Topics Concern    Not on file   Social History Narrative    Not on file       Family History:   History reviewed. No pertinent family history. Medical Decision Making:   I have independently reviewed/ordered the following labs:    CBC with Differential:   Recent Labs     12/11/20  0742 12/11/20  0742 12/12/20  1816 12/13/20  0423 12/13/20  1238   WBC 19.9*   < > 33.7* 42.9*  --    HGB 8.2*   < > 9.1* 9.1* 9.1*   HCT 25.3*   < > 30.1* 28.4* 28.9*      < > 358 442  --    LYMPHOPCT 6*  --  5*  --   --    MONOPCT 10*  --  7  --   --     < > = values in this interval not displayed.      BMP:  Recent Labs     12/12/20  0412 12/12/20  1816 12/13/20  0423    139 138   K 4.7  4.7 5.2 4.7    110* 106   CO2 22 11* 20   BUN 16 16 15   CREATININE 1.03* 1.17* 1.02*   MG 1.5* 2.3  --      Hepatic Function Panel: No results for input(s): PROT, LABALBU, BILIDIR, IBILI, BILITOT, ALKPHOS, ALT, AST in the last 72 hours. No results for input(s): RPR in the last 72 hours. No results for input(s): HIV in the last 72 hours. No results for input(s): BC in the last 72 hours.   Lab Results   Component Value Date    CREATININE 1.02 12/13/2020    GLUCOSE 110 12/13/2020    GLUCOSE 87 05/21/2012       Detailed results:    Ct Chest Wo Contrast     Result Date: 12/6/2020 EXAMINATION: CT OF THE CHEST WITHOUT CONTRAST 12/4/2020 3:08 pm TECHNIQUE: CT of the chest was performed without the administration of intravenous contrast. Multiplanar reformatted images are provided for review. Dose modulation, iterative reconstruction, and/or weight based adjustment of the mA/kV was utilized to reduce the radiation dose to as low as reasonably achievable. COMPARISON: Chest radiograph 12/03/2020, CT abdomen and pelvis 12/03/2020. HISTORY: ORDERING SYSTEM PROVIDED HISTORY: pneumonia TECHNOLOGIST PROVIDED HISTORY: pneumonia Reason for Exam: f/u pneumonia Acuity: Unknown Type of Exam: Unknown FINDINGS: Mediastinum: Suspected 17 mm lymph node posterior to the left medial clavicle appears to have increased in size from a 2012 chest CT. No other definite lymphadenopathy. No pericardial effusion. Coronary artery calcifications are seen. The thoracic aorta is nonaneurysmal.  The main pulmonary artery is not significantly dilated. Right IJ CVC catheter tip is in the SVC. There is a left chest wall cardiac device with multiple leads. Lungs/pleura: The central airways are patent. Small bilateral pleural effusions. No pneumothorax is seen. Emphysematous changes are seen in the lungs. Similar appearance to the CT abdomen and pelvis from 12/03/2020, there is a cavitary mass in the right lower lobe abutting the pleura, measuring 4.6 x 4.4 x 3.5 cm. There is dependent atelectasis in the right lung. There is a rounded opacity in the left lower lobe broadly abutting the pleura. Additional airspace opacities seen in the left lower lobe. There are also areas of atelectasis in the left lingula and left lower lobe. 3 mm right upper lobe pulmonary nodule (series 601, image 60). Upper Abdomen: Limited images of the upper abdomen are unremarkable. 4 mm hypodensity in the liver is too small to characterize but also seen on the comparison abdomen CT (series 2, image 111).  Soft Tissues/Bones: No acute bony Office: [phone]  Perfect serve / office [phone]

## 2020-12-13 NOTE — PLAN OF CARE
Problem: Falls - Risk of:  Goal: Will remain free from falls  Description: Will remain free from falls  12/13/2020 0756 by Casie Ann RN  Outcome: Ongoing  12/12/2020 1813 by Charlotte Morales RN  Outcome: Ongoing  Goal: Absence of physical injury  Description: Absence of physical injury  12/13/2020 0756 by Casie Ann RN  Outcome: Ongoing  12/12/2020 1813 by Charlotte Morales RN  Outcome: Ongoing     Problem: Pain:  Goal: Pain level will decrease  Description: Pain level will decrease  12/13/2020 0756 by Casie Ann RN  Outcome: Ongoing  12/12/2020 1813 by Charlotte Morales RN  Outcome: Ongoing  Goal: Control of acute pain  Description: Control of acute pain  12/13/2020 0756 by Casie Ann RN  Outcome: Ongoing  12/12/2020 1813 by Charlotte Morales RN  Outcome: Ongoing  Goal: Control of chronic pain  Description: Control of chronic pain  12/13/2020 0756 by Casie Ann RN  Outcome: Ongoing  12/12/2020 1813 by Charlotte Morales RN  Outcome: Ongoing     Problem: Skin Integrity:  Goal: Will show no infection signs and symptoms  Description: Will show no infection signs and symptoms  12/13/2020 0756 by Casie Ann RN  Outcome: Ongoing  12/12/2020 1813 by Charlotte Morales RN  Outcome: Ongoing  Goal: Absence of new skin breakdown  Description: Absence of new skin breakdown  12/13/2020 0756 by Casie Ann RN  Outcome: Ongoing  12/12/2020 1813 by Charlotte Morales RN  Outcome: Ongoing  Goal: Demonstration of wound healing without infection will improve  Description: Demonstration of wound healing without infection will improve  12/13/2020 0756 by Casie Ann RN  Outcome: Ongoing  12/12/2020 1813 by Charlotte Morales RN  Outcome: Ongoing  Goal: Complications related to intravenous access or infusion will be avoided or minimized Description: Complications related to intravenous access or infusion will be avoided or minimized  12/13/2020 0756 by Lino Lunsford RN  Outcome: Ongoing  12/12/2020 1813 by Monica Subramanian RN  Outcome: Ongoing     Problem: Cardiac:  Goal: Ability to maintain vital signs within normal range will improve  Description: Ability to maintain vital signs within normal range will improve  12/13/2020 0756 by Lino Lunsford RN  Outcome: Ongoing  12/12/2020 1813 by Monica Subramanian RN  Outcome: Ongoing  Goal: Cardiovascular alteration will improve  Description: Cardiovascular alteration will improve  12/13/2020 0756 by Lino Lunsford RN  Outcome: Ongoing  12/12/2020 1813 by Monica Subramanian RN  Outcome: Ongoing     Problem: Health Behavior:  Goal: Will modify at least one risk factor affecting health status  Description: Will modify at least one risk factor affecting health status  12/13/2020 0756 by Lino Lunsford RN  Outcome: Ongoing  12/12/2020 1813 by Monica Subramanian RN  Outcome: Ongoing  Goal: Identification of resources available to assist in meeting health care needs will improve  Description: Identification of resources available to assist in meeting health care needs will improve  12/13/2020 0756 by Lino Lunsford RN  Outcome: Ongoing  12/12/2020 1813 by Monica Subramanian RN  Outcome: Ongoing     Problem: Physical Regulation:  Goal: Ability to maintain vital signs within normal range will improve  Description: Ability to maintain vital signs within normal range will improve  12/13/2020 0756 by Lino Lunsford RN  Outcome: Ongoing  12/12/2020 1813 by Monica Subramanian RN  Outcome: Ongoing  Goal: Complications related to the disease process, condition or treatment will be avoided or minimized  Description: Complications related to the disease process, condition or treatment will be avoided or minimized 12/13/2020 0756 by Felice Holt RN  Outcome: Ongoing  12/12/2020 1813 by Brielle Mahajan RN  Outcome: Ongoing  Goal: Diagnostic test results will improve  Description: Diagnostic test results will improve  12/13/2020 0756 by Felice Holt RN  Outcome: Ongoing  12/12/2020 1813 by Brielle Mahajan RN  Outcome: Ongoing  Goal: Will remain free from infection  Description: Will remain free from infection  12/13/2020 0756 by Felice Holt RN  Outcome: Ongoing  12/12/2020 1813 by Brielle Mahajan RN  Outcome: Ongoing     Problem: Nutritional:  Goal: Nutritional status will improve  Description: Nutritional status will improve  12/13/2020 0756 by Felice Holt RN  Outcome: Ongoing  12/12/2020 1813 by Brielle Mahajan RN  Outcome: Ongoing     Problem: Respiratory:  Goal: Ability to maintain normal respiratory secretions will improve  Description: Ability to maintain normal respiratory secretions will improve  12/13/2020 0756 by Felice Holt RN  Outcome: Ongoing  12/12/2020 1813 by Brielle Mahajan RN  Outcome: Ongoing     Problem: HEMODYNAMIC STATUS  Goal: Patient has stable vital signs and fluid balance  12/13/2020 0756 by Felice Holt RN  Outcome: Ongoing  12/12/2020 1813 by Brielle Mahajan RN  Outcome: Ongoing     Problem: ACTIVITY INTOLERANCE/IMPAIRED MOBILITY  Goal: Mobility/activity is maintained at optimum level for patient  12/13/2020 0756 by Felice Holt RN  Outcome: Ongoing  12/12/2020 1813 by Brielle Mahajan RN  Outcome: Ongoing     Problem: COMMUNICATION IMPAIRMENT  Goal: Ability to express needs and understand communication  12/13/2020 0756 by Felice Holt RN  Outcome: Ongoing  12/12/2020 1813 by Brielle Mahajan RN  Outcome: Ongoing     Problem: Nutrition  Goal: Optimal nutrition therapy  Description: Nutrition Problem #1: Inadequate oral intake Intervention: Food and/or Nutrient Delivery: Continue Current Diet, Start Oral Nutrition Supplement  Nutritional Goals: po intake greater than 50%     12/13/2020 0756 by Ramila Camargo RN  Outcome: Ongoing  12/12/2020 1813 by Cyril Logan RN  Outcome: Ongoing     Problem: Musculor/Skeletal Functional Status  Goal: Highest potential functional level  12/13/2020 0756 by Ramila Camargo RN  Outcome: Ongoing  12/12/2020 1813 by Cyril Logan RN  Outcome: Ongoing  Goal: Absence of falls  12/13/2020 0756 by Ramila Camargo RN  Outcome: Ongoing  12/12/2020 1813 by Cyril Logan RN  Outcome: Ongoing

## 2020-12-14 PROBLEM — J96.01 ACUTE RESPIRATORY FAILURE WITH HYPOXIA AND HYPERCAPNIA (HCC): Status: ACTIVE | Noted: 2020-01-01

## 2020-12-14 PROBLEM — E27.40 ACUTE ADRENAL INSUFFICIENCY (HCC): Status: ACTIVE | Noted: 2020-01-01

## 2020-12-14 PROBLEM — E43 SEVERE MALNUTRITION (HCC): Status: ACTIVE | Noted: 2020-01-01

## 2020-12-14 PROBLEM — E87.20 METABOLIC ACIDOSIS: Status: ACTIVE | Noted: 2020-01-01

## 2020-12-14 PROBLEM — J96.02 ACUTE RESPIRATORY FAILURE WITH HYPOXIA AND HYPERCAPNIA (HCC): Status: ACTIVE | Noted: 2020-01-01

## 2020-12-14 NOTE — PROGRESS NOTES
Dr. Gallegos Gains notified of patients low blood pressure along with changes in vent settings. Orders received for Levophed.

## 2020-12-14 NOTE — PROGRESS NOTES
Speech Language Pathology  [address] [place] [address]  Speech Language Pathology    Date: 12/14/2020  Patient Name: Augustin Hood  YOB: 1950   AGE: 65 y.o. MRN: [id number]        Patient Not Available for Speech Therapy     Due to:  [] Testing  [] Hemodialysis  [] Cancelled by RN  [] Surgery   [x] Intubation/Sedation/Pain Medication  [] Medical instability  [] Other: Pt. intubated at this time. ST to continue to follow. Next scheduled treatment:   Completed by:  Liz Maldonado M.A., CCC-SLP

## 2020-12-14 NOTE — PROGRESS NOTES
Dr. Randle Settler notified of decrease in blood pressure along with increase in heart rate. Order to attempt to increase blood pressure with vasopressors and restart sedation.

## 2020-12-14 NOTE — PROGRESS NOTES
Per Dr Stephy Orozco, vent settings changed from PEEP of 8cmH2O to 22ikC6C and changed RR from 18bpm to 24bpm.

## 2020-12-14 NOTE — PROGRESS NOTES
Patient's sister-in-law, [employer], and brother Joesph Ferrara on phone for update. Writer did update on patient condition including declining blood pressure and respiratory status. Writer again went over and explained different code status options. Decision made to keep patient FULL code. Family made aware of declining condition of patient and undstanding.   Would like to be notified if patient arrest.

## 2020-12-14 NOTE — PROGRESS NOTES
PULMONARY PROGRESS NOTE:    REASON FOR VISIT: pneumonia  Interval History:      Sedated, on vent    Events since last visit: intubated overnight, requiring more pressors    PAST MEDICAL HISTORY:      Scheduled Meds:   metroNIDAZOLE  500 mg Intravenous Q8H    sodium phosphate IVPB  15 mmol Intravenous Once    cefepime  2 g Intravenous Q12H    vancomycin  1,000 mg Intravenous Q24H    vancomycin (VANCOCIN) intermittent dosing (placeholder)   Other RX Placeholder    hydrocortisone sodium succinate PF  100 mg Intravenous Q8H    chlorhexidine  15 mL Mouth/Throat BID    sodium chloride  500 mL Intravenous Once    pantoprazole  40 mg Intravenous Daily    And    sodium chloride (PF)  10 mL Intravenous Daily    sodium chloride flush  10 mL Intravenous 2 times per day    potassium chloride  40 mEq Oral BID WC    ferrous sulfate  325 mg Oral BID WC    [Held by provider] furosemide  20 mg Intravenous Daily    traZODone  50 mg Oral Nightly    [Held by provider] metoprolol tartrate  50 mg Oral BID    budesonide-formoterol  2 puff Inhalation BID    divalproex  125 mg Oral BID    sertraline  50 mg Oral Daily    mirtazapine  7.5 mg Oral Nightly    levothyroxine  125 mcg Oral Daily    baclofen  10 mg Oral BID    sodium chloride flush  10 mL Intravenous 2 times per day    [Held by provider] enoxaparin  40 mg Subcutaneous Daily     Continuous Infusions:   norepinephrine      sodium bicarbonate infusion 100 mL/hr at 12/14/20 0959    vasopressin (Septic Shock) infusion 0.02 Units/min (12/14/20 1020)    fentaNYL Stopped (12/14/20 0748)    dexmedetomidine Stopped (12/14/20 0742)    phenylephrine (JAIR-SYNEPHRINE) 50mg/250mL infusion 200 mcg/min (12/14/20 1047) PRN Meds:morphine, sodium chloride flush, promethazine (PHENERGAN) in sodium chloride 0.9% IVPB, HYDROcodone 5 mg - acetaminophen, perflutren lipid microspheres, sodium chloride flush, ipratropium-albuterol, sodium chloride flush, potassium chloride **OR** potassium alternative oral replacement **OR** potassium chloride, magnesium sulfate, acetaminophen **OR** acetaminophen, polyethylene glycol, promethazine **OR** ondansetron        PHYSICAL EXAMINATION:  BP (!) 104/58   Pulse 84   Temp 98 °F (36.7 °C) (Oral)   Resp 17   Ht 5' (1.524 m)   Wt 148 lb 9.6 oz (67.4 kg)   SpO2 96%   BMI 29.02 kg/m²   afebrile  General : sedated, orally intubated neosynephrine, vasopresine, +/- levophed  Neck  supple, no lymphadenopathy, JVD not raised  Heart  regular rhythm, S1 and S2 normal; no additional sounds heard  Lungs  Air Entry- fair bilaterally; breath sounds : vesicular,   Abdomen  soft, no tenderness  Upper Extremities  - no cyanosis, mottling, LUE lymphedema and contracted  Lower Extremities: no cyanosis, mottling; + edema / tight shiny skin    Current Laboratory, Radiologic, Microbiologic, and Diagnostic studies reviewed  Data ReviewCBC:   Recent Labs     12/12/20 1816 12/13/20 0423 12/13/20 1238 12/13/20 1938 12/14/20  0401   WBC 33.7* 42.9*  --   --  44.2*   RBC 3.23* 3.16*  --   --  3.08*   HGB 9.1* 9.1* 9.1* 9.6* 8.8*   HCT 30.1* 28.4* 28.9* 29.5* 27.8*    442  --   --  390     BMP:   Recent Labs     12/12/20 1816 12/13/20 0423 12/14/20  0401   GLUCOSE 65* 110* 181*    138 130*   K 5.2 4.7 4.0   BUN 16 15 16   CREATININE 1.17* 1.02* 0.94*   CALCIUM 8.1* 7.8* 7.3*     ABGs:   Recent Labs     12/12/20 2052 12/13/20 1925 12/14/20  0438   PHART 7.368 7.146* 7.391   PO2ART 122.0* 52.5* 236.0*   BAN9JOS 28.3* 45.8* 30.2*   OSF2GNG 16.3* 15.8* 18.3*   [id number] 97.5 77.4* 98.1*      PT/INR:  No results found for: PTINR    ASSESSMENT / PLAN:    Pneumonia - ABx F/u CT chest 4-6 weeks; may need needle biopsy   sinus tachycardia/ anemia/ hypotension- GI consult/ monitor Hgb  Leucocytosis etio unclear- Dr Jean Falk to address antibiotics - ?  Sepsis  Shock - fluids, pressors, hydrocortisone  ABx per ID  500ml fluid bolus  albumin    Electronically signed by Silvia Still on 12/14/20 at 11:06 AM.

## 2020-12-14 NOTE — CONSULTS
[facility]  In PatientCardiology Consult             Date:   12/13/20  Patient name: Nova Singh  Date of admission:  12/3/2020  1:20 AM  MRN:   [id number]  YOB: 1950      Reason for Admission:  Pneumonia; sepsis    CHIEF COMPLAINT:  Weakness    History Obtained From:  Medical record    HISTORY OF PRESENT ILLNESS:      The patient is a 65 y.o woman with h/o HTN, SSS and PPM, admitted for pneumonia and sepsis. She had a hypotensive episode and was transferred to ICU, now stabilizing on IV pressors and fluids. EKG shows sinus tachycardia, 128 bpm, with no acute changes. Serial troponins have been elevated but stable at 48 and 44 ng/L. Past Medical History:   has a past medical history of Abnormal computed tomography of cervical spine, CVA (cerebral vascular accident) ([facility] [address] GERD (gastroesophageal reflux disease), Hypertension, Paraproteinemia, and Weight loss. Past Surgical History:   has a past surgical history that includes pacemaker placement (07/2011); intubation (4/14/2019); Upper gastrointestinal endoscopy (N/A, 4/16/2019); laparoscopy (N/A, 5/15/2019); Cholecystectomy (N/A, 5/15/2019); Abdomen surgery (N/A, 5/25/2019); Gastrostomy tube placement (N/A, 5/25/2019); and Peg Tube Removal (N/A, 7/27/2019). Home Medications:    Prior to Admission medications    Medication Sig Start Date End Date Taking? Authorizing Provider   baclofen (LIORESAL) 10 MG tablet Take 10 mg by mouth 2 times daily Give 0.5 tablet by mouth two times a day for spasms   Yes Historical Provider, MD   HYDROcodone-acetaminophen (NORCO) 5-325 MG per tablet Take 1 tablet by mouth every 6 hours as needed for Pain.    Yes Historical Provider, MD   dextromethorphan-quiNIDine (NUEDEXTA) 20-10 MG CAPS per capsule Take 1 capsule by mouth every 12 hours Give 1 capule by mouth every 12 hours for pseudobulbar affect   Yes Historical Provider, MD traZODone (DESYREL) 100 MG tablet Take 100 mg by mouth nightly For insomnia   Yes Historical Provider, MD   divalproex (DEPAKOTE) 125 MG DR tablet Take 125 mg by mouth 2 times daily   Yes Historical Provider, MD   ipratropium-albuterol (DUONEB) 0.5-2.5 (3) MG/3ML SOLN nebulizer solution Inhale 1 vial into the lungs every 8 hours as needed for Shortness of Breath   Yes Historical Provider, MD   furosemide (LASIX) 20 MG tablet Take 20 mg by mouth daily    Yes Historical Provider, MD   mirtazapine (REMERON) 7.5 MG tablet Take 7.5 mg by mouth nightly   Yes Historical Provider, MD   ondansetron (ZOFRAN) 4 MG tablet Take 4 mg by mouth every 6 hours as needed for Nausea or Vomiting   Yes Historical Provider, MD   sertraline (ZOLOFT) 25 MG tablet Take 50 mg by mouth daily   Yes Historical Provider, MD   amiodarone (PACERONE) 100 MG tablet Take 1 tablet by mouth 2 times daily 5/31/19  Yes Celia Altamirano MD   metoprolol tartrate (LOPRESSOR) 50 MG tablet Take 1 tablet by mouth 2 times daily 5/31/19  Yes Celia Altamirano MD   levothyroxine (SYNTHROID) 75 MCG tablet Take 1 tablet by mouth Daily  Patient taking differently: Take 125 mcg by mouth Daily  6/1/19  Yes Celia Altamirano MD   budesonide-formoterol (SYMBICORT) 160-4.5 MCG/ACT AERO Inhale 2 puffs into the lungs 2 times daily   Yes Historical Provider, MD   Misc. Devices [facility] [address] [facility] wheelchair with left fupper extremity support  Dx: stroke with left hemiparesis. 7/24/17  Yes Agustin Ornelas MD       Allergies:  Penicillins and Amoxicillin    Social History:   reports that she has quit smoking. She has a 30.00 pack-year smoking history. She has never used smokeless tobacco. She reports previous alcohol use of about 28.0 standard drinks of alcohol per week. She reports that she does not use drugs.      Family History:   Positive for early CAD    REVIEW OF SYSTEMS: · Constitutional: there has been no unanticipated weight loss. There's been No change in energy level, No change in activity level. · Eyes: No visual changes or diplopia. No scleral icterus. · ENT: No Headaches, hearing loss or vertigo. No mouth sores or sore throat. · Cardiovascular: No problem  · Respiratory: No previous reported problems  · Gastrointestinal: No abdominal pain, appetite loss, blood in stools. No change in bowel or bladder habits. · Genitourinary: No dysuria, trouble voiding, or hematuria. · Musculoskeletal:  No gait disturbance, No weakness or joint complaints. · Integumentary: No rash or pruritis. · Neurological: No headache, diplopia, change in muscle strength, numbness or tingling. No change in gait, balance, coordination, mood, affect, memory, mentation, behavior. · Psychiatric: No anxiety, or depression. · Endocrine: No temperature intolerance. No excessive thirst, fluid intake, or urination. No tremor. · Hematologic/Lymphatic: No abnormal bruising or bleeding, blood clots or swollen lymph nodes. · Allergic/Immunologic: No nasal congestion or hives. PHYSICAL EXAM:    Physical Examination:    /78   Pulse 75   Temp 96.6 °F (35.9 °C) (Axillary)   Resp 20   Ht 5' (1.524 m)   Wt 148 lb 9.6 oz (67.4 kg)   SpO2 95%   BMI 29.02 kg/m²    Constitutional and General Appearance: Intubated; responds to commands  HEENT: PERRL, no cervical lymphadenopathy. No masses palpable. Normal oral mucosa  Respiratory:  · Normal excursion and expansion without use of accessory muscles  · Resp Auscultation: Good respiratory effort. No for increased work of breathing.  On auscultation: clear to auscultation bilaterally  Cardiovascular:  · The apical impulse is not displaced  · Heart tones are crisp and normal. regular S1 and S2. Murmurs:  None  · Jugular venous pulsation Normal  · The carotid upstroke is normal in amplitude and contour without delay or bruit · Peripheral pulses are symmetrical and full   Abdomen:  · No masses or tenderness  · Bowel sounds present  Extremities:  ·  No Cyanosis or Clubbing  ·  Lower extremity edema: None  ·  Skin: Warm and dry  Neurological:  · Alert and oriented. · Moves all extremities well  · No abnormalities of mood, affect, memory, mentation, or behavior are noted    DATA:    Diagnostics:      EKG:  Sinus tachycardia; non-specific ST and T wave abnormality    2D ECHO ( 12/3/20)  . Summary  Small LV cavity. Normal left ventricular ejection fraction (>55%). Normal wall motions. Mild-moderate left ventricular hypertrophy. Normal right ventricle size and function. Pacemaker lead seen in right heart chambers. No obvious vegetation seen. Aortic valve is trileaflet. No aortic insufficiency. Normal mitral valve structure and function. Mild mitral regurgitation. Normal tricuspid valve structure and function. Mild tricuspid regurgitation. Estimated right ventricular systolic pressure is 42 mmHg. Mildly elevated  right ventricular systolic pressure. Pulmonic valve not well visualized. No pulmonic insufficiency. No obvious valvular vegetation visualized on this study. Trivial pericardial effusion. Labs:     CBC:   Recent Labs     12/13/20 0423 12/13/20 0423 12/13/20  1938 12/14/20  0401   WBC 42.9*  --   --  44.2*   HGB 9.1*   < > 9.6* 8.8*   HCT 28.4*   < > 29.5* 27.8*     --   --  390    < > = values in this interval not displayed. BMP:   Recent Labs     12/13/20 0423 12/14/20  0401    130*   K 4.7 4.0   CO2 20 19*   BUN 15 16   CREATININE 1.02* 0.94*   LABGLOM 53* 59*   GLUCOSE 110* 181*     BNP: No results for input(s): BNP in the last 72 hours. PT/INR: No results for input(s): PROTIME, INR in the last 72 hours. APTT:No results for input(s): APTT in the last 72 hours. CARDIAC ENZYMES:No results for input(s): CKTOTAL, CKMB, CKMBINDEX, TROPONINI in the last 72 hours.   FASTING LIPID PANEL:  Lab Results Component Value Date    HDL 30 03/16/2020    TRIG 99 03/16/2020     LIVER PROFILE:  Recent Labs     12/13/20  1839   AST 23   ALT <5*   LABALBU 2.1*         IMPRESSION:    1. Pneumonia  2. Septic shock  3. Sinus tachycardia  4. Stable troponin elevation c/w Type II MI  5. Sick sinus syndrome and PPM  6. COPD    Patient Active Problem List   Diagnosis    Paraproteinemia    [facility]A [facility]ce[facility] vascular accident) ([facility] [address]    Abnormal computed tomography of cervical spine    Weight loss    Functional gait abnormality    Left knee pain    Knee pain, left    Acute pain of left knee    Sepsis due to urinary tract infection (HCC)    Cystitis    Emphysematous cystitis    Septic shock (HCC)    Leukemoid reaction    Aspiration pneumonia of right lower lobe due to vomit ([facility] [address]    MRSA colonization    Urinary retention    Abdominal distention    Elevated CEA    Elevated CA 19-9 level    Cholecystitis    Elevated C-reactive protein (CRP)    Elevated procalcitonin    Bandemia    Centrilobular emphysema (HCC)    Hemorrhage of rectum and anus    GI bleed    Anemia    Small bowel obstruction (HCC)    Anxiety disorder    Noncompliance    Acute respiratory failure (HCC)    Chronic obstructive pulmonary disease, unspecified (HCC)    Bacteremia due to coagulase-negative Staphylococcus    Bradycardia    Fever    Abdominal wall abscess at site of surgical wound    Hypotension    Hospital-acquired pneumonia    Line sepsis (HCC)    Pacemaker    Bacteremia due to Staphylococcus    SURINDER (acute kidney injury) ([address]       RECOMMENDATIONS:  1. Continue supportive care, IV pressors and antibiotics  2. Will ask [employer] to evaluate pacemaker    Discussed with patient and nursing.     Electronically signed by Ирина Brito MD on 12/14/2020 at 7:53 AM.  [facility] cardiology Consultant

## 2020-12-14 NOTE — PROGRESS NOTES
Writer reviewed patient's ABG's with Dr. Clay Marmolejo. Orders are to increase RR to 24 from 18 and to increase PEEP to 14 from 8. See orders.

## 2020-12-14 NOTE — PROGRESS NOTES
[facility]   Progress Note                   Date:   12/14/2020  Patient name: Jason Chery  Date of admission:  12/3/2020  1:20 AM  MRN:   [id number]  YOB: 1950  PCP: Esther Segal MD    Reason for Admission: Hypotension [I95.9]    Subjective:       Clinical Changes / Abnormalities: remains intubated and sedated.  On pressors      Medications:   Scheduled Meds:   metroNIDAZOLE  500 mg Intravenous Q8H    sodium phosphate IVPB  15 mmol Intravenous Once    sodium chloride  500 mL Intravenous Once    albumin human  25 g Intravenous Q12H    cefepime  2 g Intravenous Q12H    vancomycin  1,000 mg Intravenous Q24H    vancomycin (VANCOCIN) intermittent dosing (placeholder)   Other RX Placeholder    hydrocortisone sodium succinate PF  100 mg Intravenous Q8H    chlorhexidine  15 mL Mouth/Throat BID    sodium chloride  500 mL Intravenous Once    pantoprazole  40 mg Intravenous Daily    And    sodium chloride (PF)  10 mL Intravenous Daily    sodium chloride flush  10 mL Intravenous 2 times per day    potassium chloride  40 mEq Oral BID WC    ferrous sulfate  325 mg Oral BID WC    [Held by provider] furosemide  20 mg Intravenous Daily    traZODone  50 mg Oral Nightly    [Held by provider] metoprolol tartrate  50 mg Oral BID    budesonide-formoterol  2 puff Inhalation BID    divalproex  125 mg Oral BID    sertraline  50 mg Oral Daily    mirtazapine  7.5 mg Oral Nightly    levothyroxine  125 mcg Oral Daily    baclofen  10 mg Oral BID    sodium chloride flush  10 mL Intravenous 2 times per day    [Held by provider] enoxaparin  40 mg Subcutaneous Daily     Continuous Infusions:   norepinephrine      sodium bicarbonate infusion 100 mL/hr at 12/14/20 0959    vasopressin (Septic Shock) infusion 0.02 Units/min (12/14/20 1020)    fentaNYL Stopped (12/14/20 0748)    dexmedetomidine Stopped (12/14/20 0742)  phenylephrine (JAIR-SYNEPHRINE) 50mg/250mL infusion 200 mcg/min (12/14/20 1047)     CBC:   Recent Labs     12/12/20  1816 12/13/20  0423 12/13/20  1238 12/13/20  1938 12/14/20  0401   WBC 33.7* 42.9*  --   --  44.2*   HGB 9.1* 9.1* 9.1* 9.6* 8.8*    442  --   --  390     BMP:    Recent Labs     12/12/20  1816 12/13/20  0423 12/14/20  0401    138 130*   K 5.2 4.7 4.0   * 106 95*   CO2 11* 20 19*   BUN 16 15 16   CREATININE 1.17* 1.02* 0.94*   GLUCOSE 65* 110* 181*     Hepatic:   Recent Labs     12/13/20  1839   AST 23   ALT <5*   BILITOT 0.18*   ALKPHOS 83     Troponin: No results for input(s): TROPONINI in the last 72 hours. BNP: No results for input(s): BNP in the last 72 hours. Lipids: No results for input(s): CHOL, HDL in the last 72 hours. Invalid input(s): LDLCALCU  INR: No results for input(s): INR in the last 72 hours. Objective:   Vitals: BP (!) 104/58   Pulse 84   Temp 98 °F (36.7 °C) (Oral)   Resp 17   Ht 5' (1.524 m)   Wt 148 lb 9.6 oz (67.4 kg)   SpO2 96%   BMI 29.02 kg/m²   General appearance: intubated and sedated  HEENT: Head: Normocephalic, no lesions, without obvious abnormality. Neck: no JVD  Lungs: CTAB  Heart: RRR s1+s2, no murmurs  Abdomen: soft, non-tender  Extremities: no edema  Neurologic: not done    2D ECHO ( 12/3/20)  . Summary  Small LV cavity. Normal left ventricular ejection fraction (>55%). Normal wall motions. Mild-moderate left ventricular hypertrophy. Normal right ventricle size and function. Pacemaker lead seen in right heart chambers. No obvious vegetation seen. Aortic valve is trileaflet. No aortic insufficiency. Normal mitral valve structure and function. Mild mitral regurgitation. Normal tricuspid valve structure and function. Mild tricuspid regurgitation. Estimated right ventricular systolic pressure is 42 mmHg. Mildly elevated  right ventricular systolic pressure. Pulmonic valve not well visualized. No pulmonic insufficiency. No obvious valvular vegetation visualized on this study. Trivial pericardial effusion. Assessment / Acute Cardiac Problems:   1. Pneumonia  2. Septic shock  3. Sinus tachycardia  4. Stable troponin elevation c/w Type II MI  5. Preserved LV systolic function  6. Sick sinus syndrome and PPM  7. COPD    Patient Active Problem List:     Paraproteinemia     CVA (cerebral vascular accident) ([facility] [address]     Abnormal computed tomography of cervical spine     Weight loss     Functional gait abnormality     Left knee pain     Knee pain, left     Acute pain of left knee     Sepsis due to urinary tract infection (HCC)     Cystitis     Emphysematous cystitis     Septic shock (HCC)     Leukemoid reaction     Aspiration pneumonia of right lower lobe due to vomit ([address]     MRSA colonization     Urinary retention     Abdominal distention     Elevated CEA     Elevated CA 19-9 level     Cholecystitis     Elevated C-reactive protein (CRP)     Elevated procalcitonin     Bandemia     Centrilobular emphysema (HCC)     Hemorrhage of rectum and anus     GI bleed     Anemia     Small bowel obstruction (HCC)     Anxiety disorder     Noncompliance     Acute respiratory failure (HCC)     Chronic obstructive pulmonary disease, unspecified (HCC)     Bacteremia due to coagulase-negative Staphylococcus     Bradycardia     Fever     Abdominal wall abscess at site of surgical wound     Hypotension     Hospital-acquired pneumonia     Line sepsis ([address]     Pacemaker     Bacteremia due to Staphylococcus     SURINDER (acute kidney injury) ([address]      Plan of Treatment:   1. Continue supportive care  2. Pressor as needed  3. On IV antibiotics. ID is on board  4.  PPM interrogation    Marcos Perkins [address] [facility]  [phone]

## 2020-12-14 NOTE — ANESTHESIA PROCEDURE NOTES
Arterial Line:    An arterial line was placed using ultrasound guidance, in the ICU for the following indication(s): . A (size), (length), (type) catheter was placed, into the right femoral artery, secured by . Anesthesia type: Local  Local infiltration: Topical    Events:  greater than 3 attempts. Additional notes:  Multiple attempts made to do arterial line , right and left femoral A.  And also ar right radial A unsuccessful   12/13/2020 11:00 PM12/14/2020 12:30 AM  Anesthesiologist: Darin Fernandes MD  Preanesthetic Checklist  Completed: patient identified, IV checked, site marked, risks and benefits discussed, surgical consent, monitors and equipment checked, pre-op evaluation, timeout performed, anesthesia consent given, oxygen available and patient being monitored

## 2020-12-14 NOTE — PROGRESS NOTES
Pt SpO2 remains in the 70s on vent settings og 100% and PEEP of [id number]. Pt RR remains tachypneic in the high 30s and low 40s.

## 2020-12-14 NOTE — PROGRESS NOTES
PRN Meds:morphine, sodium chloride flush, promethazine (PHENERGAN) in sodium chloride 0.9% IVPB, HYDROcodone 5 mg - acetaminophen, perflutren lipid microspheres, sodium chloride flush, ipratropium-albuterol, sodium chloride flush, potassium chloride **OR** potassium alternative oral replacement **OR** potassium chloride, magnesium sulfate, acetaminophen **OR** acetaminophen, polyethylene glycol, promethazine **OR** ondansetron        PHYSICAL EXAMINATION:  BP (!) 104/58   Pulse 86   Temp 97.6 °F (36.4 °C) (Oral)   Resp 13   Ht 5' (1.524 m)   Wt 148 lb 9.6 oz (67.4 kg)   SpO2 96%   BMI 29.02 kg/m²   afebrile  General : sedated and on mechanical ventilation  Neck  supple, no lymphadenopathy, JVD not raised  Heart  SR  Lungs  Air Entry- fair bilaterally; breath sounds : vesicular  Abdomen  soft, no tenderness  Upper Extremities  - no cyanosis lymphedema and contracted  Lower Extremities: no cyanosis, mottling; + edema     Current Laboratory, Radiologic, Microbiologic, and Diagnostic studies reviewed  Data ReviewCBC:   Recent Labs     12/12/20 1816 12/13/20 0423 12/13/20 0423 12/13/20  1938 12/14/20  0401 12/14/20  1330   WBC 33.7* 42.9*  --   --  44.2*  --    RBC 3.23* 3.16*  --   --  3.08*  --    HGB 9.1* 9.1*   < > 9.6* 8.8* 8.1*   HCT 30.1* 28.4*   < > 29.5* 27.8* 25.8*    442  --   --  390  --     < > = values in this interval not displayed.      BMP:   Recent Labs     12/12/20 1816 12/13/20 0423 12/14/20  0401   GLUCOSE 65* 110* 181*    138 130*   K 5.2 4.7 4.0   BUN 16 15 16   CREATININE 1.17* 1.02* 0.94*   CALCIUM 8.1* 7.8* 7.3*     ABGs:   Recent Labs     12/12/20 2052 12/13/20 1925 12/14/20  0438   PHART 7.368 7.146* 7.391   PO2ART 122.0* 52.5* 236.0*   [id number] 28.3* 45.8* 30.2*   MCQ4XCS 16.3* 15.8* 18.3*   [id number] 97.5 77.4* 98.1*      PT/INR:  No results found for: PTINR    ASSESSMENT / PLAN:    Pneumonia - ABx  Mechanical ventilation, intubated 12-13-20

## 2020-12-14 NOTE — PROGRESS NOTES
Rounded with Dr. Sav Patterson. Updated on patient condition. Orders received for 500 ml fluid bolus, albumin 25g q12h, and ok to start tube feeds.

## 2020-12-14 NOTE — PROGRESS NOTES
Fio2 decreased to 50%, Peep decreased to 10, rate decreased to 20 per vent protocol. Sao2 95%, BP 69/39.

## 2020-12-14 NOTE — PROGRESS NOTES
Department of internal medicine  Section of nephrology  Daily progress note      REASON FOR CONSULTATION: Management of acute kidney injury    Interval history: Patient was seen and examined in the ICU. She underwent respiratory and hemodynamic decompensation yesterday prompting endotracheal intubation at about 6:30 PM on 2020. She is currently on FiO2 50% with PEEP of 10. She remains hemodynamically unstable and is on maximum dose of Kapil-Synephrine and vasopressin. She is oligoanuric with total urine output 75 mL overnight. HISTORY OF PRESENT ILLNESS:  Patient is a 66 yo F with significant PMH of COPD, left-sided stroke, hospital acquired pneumonia, pacemaker, sepsis due to UTI, acute cystitis who has been admitted to [facility] since 12/3 for management of pneumonia and hypotension. Patient resides at [facility], was hypotensive on admission and was given lovephed for one day. She had a 9 day ICU stay, is currently on Ancef and Monodox for pneumonia. Also had mild bilateral pleural effusions. Patient will be discharged home on Ancef for 5 weeks via PICC line insertion. Patient received vancomycin during initial admission, Cr has been consistently rising for the last few days. Cr is 0.93 today. Patient feels well overall, is fatigued however and falling asleep mid sentence. Complaining of right sided pain. Has a decreased appetite, on Megestrol. MEDICATION PRIOR TO ADMISSION:  Medications Prior to Admission: baclofen (LIORESAL) 10 MG tablet, Take 10 mg by mouth 2 times daily Give 0.5 tablet by mouth two times a day for spasms  [] doxycycline hyclate (VIBRA-TABS) 100 MG tablet, Take 100 mg by mouth 2 times daily  HYDROcodone-acetaminophen (NORCO) 5-325 MG per tablet, Take 1 tablet by mouth every 6 hours as needed for Pain. dextromethorphan-quiNIDine (NUEDEXTA) 20-10 MG CAPS per capsule, Take 1 capsule by mouth every 12 hours Give 1 capule by mouth every 12 hours for pseudobulbar affect  traZODone (DESYREL) 100 MG tablet, Take 100 mg by mouth nightly For insomnia  divalproex (DEPAKOTE) 125 MG DR tablet, Take 125 mg by mouth 2 times daily  ipratropium-albuterol (DUONEB) 0.5-2.5 (3) MG/3ML SOLN nebulizer solution, Inhale 1 vial into the lungs every 8 hours as needed for Shortness of Breath  furosemide (LASIX) 20 MG tablet, Take 20 mg by mouth daily   mirtazapine (REMERON) 7.5 MG tablet, Take 7.5 mg by mouth nightly  ondansetron (ZOFRAN) 4 MG tablet, Take 4 mg by mouth every 6 hours as needed for Nausea or Vomiting  sertraline (ZOLOFT) 25 MG tablet, Take 50 mg by mouth daily  amiodarone (PACERONE) 100 MG tablet, Take 1 tablet by mouth 2 times daily  metoprolol tartrate (LOPRESSOR) 50 MG tablet, Take 1 tablet by mouth 2 times daily  levothyroxine (SYNTHROID) 75 MCG tablet, Take 1 tablet by mouth Daily (Patient taking differently: Take 125 mcg by mouth Daily )  budesonide-formoterol (SYMBICORT) 160-4.5 MCG/ACT AERO, Inhale 2 puffs into the lungs 2 times daily  Misc. Devices [facility]) MISC, Power wheelchair with left fupper extremity support Dx: stroke with left hemiparesis.     Allergies:  Penicillins and Amoxicillin    LABS:  CBC:  Recent Labs     12/12/20  1816 12/13/20  0423 12/13/20  1238 12/13/20  1938 12/14/20  0401   WBC 33.7* 42.9*  --   --  44.2*   RBC 3.23* 3.16*  --   --  3.08*   HGB 9.1* 9.1* 9.1* 9.6* 8.8*   HCT 30.1* 28.4* 28.9* 29.5* 27.8*   MCV 93.2 90.0  --   --  90.3   MCH 28.2 28.9  --   --  28.4   MCHC 30.3* 32.1  --   --  31.5   RDW 17.1* 16.6*  --   --  16.9*    442  --   --  390   MPV 7.9 7.3  --   --  7.7      Comprehensive Metabolic Profile:   Recent Labs     12/12/20  1816 12/13/20  0423 12/13/20  1839 12/14/20  0401    138  --  130*   K 5.2 4.7  --  4.0   * 106  --  95*   CO2 11* 20  --  19* BUN 16 15  --  16   CREATININE 1.17* 1.02*  --  0.94*   GLUCOSE 65* 110*  --  181*   CALCIUM 8.1* 7.8*  --  7.3*   PROT  --   --  5.1*  --    LABALBU  --   --  2.1*  --    BILITOT  --   --  0.18*  --    ALKPHOS  --   --  83  --    AST  --   --  23  --    ALT  --   --  <5*  --       Thyroid functions:   Lab Results   Component Value Date    TSH 0.93 09/16/2020      HgBA1c:    Lab Results   Component Value Date    LABA1C 5.3 04/12/2019     S. Calcium:  Recent Labs     12/14/20  0401   CALCIUM 7.3*     S. Magnesium:  Recent Labs     12/12/20  1816   MG 2.3     S. Phosphorus:  Recent Labs     12/13/20  1238   PHOS 1.3*     FASTING LIPID PANEL:  Lab Results   Component Value Date    CHOL 128 03/16/2020    HDL 30 (L) 03/16/2020    TRIG 99 03/16/2020       AMYLASE/LIPASE/AMMONIA  Recent Labs     12/13/20  1839   LIPASE 11*     Urinalysis:   Lab Results   Component Value Date    NITRU NEGATIVE 12/11/2020    COLORU YELLOW 12/11/2020    PHUR 6.0 12/11/2020    WBCUA 0 TO 2 12/11/2020    RBCUA 0 TO 2 12/11/2020    MUCUS NOT REPORTED 12/11/2020    TRICHOMONAS NOT REPORTED 12/11/2020    YEAST NOT REPORTED 12/11/2020    BACTERIA FEW 12/11/2020    SPECGRAV 1.007 12/11/2020    LEUKOCYTESUR NEGATIVE 12/11/2020    UROBILINOGEN Normal 12/11/2020    BILIRUBINUR NEGATIVE 12/11/2020    BILIRUBINUR NEGATIVE 05/17/2012    GLUCOSEU NEGATIVE 12/11/2020    GLUCOSEU NEGATIVE 05/17/2012    KETUA NEGATIVE 12/11/2020    AMORPHOUS NOT REPORTED 12/11/2020     PHYSICAL EXAM:  /78   Pulse 75   Temp 96.6 °F (35.9 °C) (Axillary)   Resp 20   Ht 5' (1.524 m)   Wt 148 lb 9.6 oz (67.4 kg)   SpO2 95%   BMI 29.02 kg/m²      · General appearance: Patient intubated and on ventilator support.   · Lungs: Diminished breath sounds on BiPAP  · Heart: Tachycardic regular rhythm  · Abdomen: soft, non-tender; bowel sounds normal; no masses,  no organomegaly  · Extremities: extremities normal, atraumatic, no cyanosis + edema

## 2020-12-14 NOTE — PROGRESS NOTES
Newly intubated pt w/ increased work of breathing despite full support. Pt is tachypneic and tachycardia. Pulse probe only intermittently picking up last reading was 76%. Post intubation ABG drawn and given to RN. Awaiting orders. ABG agrees with SpO2 reading.

## 2020-12-14 NOTE — PLAN OF CARE
Problem: Falls - Risk of:  Goal: Will remain free from falls  Description: Will remain free from falls  12/14/2020 0427 by Ammon Frey RN  Outcome: Ongoing  12/13/2020 1944 by Mariann Keenan RN  Outcome: Ongoing  Goal: Absence of physical injury  Description: Absence of physical injury  12/14/2020 0427 by Ammon Frey RN  Outcome: Ongoing  12/13/2020 1944 by Mariann Keenan RN  Outcome: Ongoing     Problem: Pain:  Goal: Pain level will decrease  Description: Pain level will decrease  12/14/2020 0427 by Ammon Frey RN  Outcome: Ongoing  12/13/2020 1944 by Mariann Keenan RN  Outcome: Ongoing  Goal: Control of acute pain  Description: Control of acute pain  12/14/2020 0427 by Ammon Frey RN  Outcome: Ongoing  12/13/2020 1944 by Mariann Keenan RN  Outcome: Ongoing  Goal: Control of chronic pain  Description: Control of chronic pain  12/14/2020 0427 by Ammon Frey RN  Outcome: Ongoing  12/13/2020 1944 by Mariann Keenan RN  Outcome: Ongoing     Problem: Skin Integrity:  Goal: Will show no infection signs and symptoms  Description: Will show no infection signs and symptoms  12/14/2020 0427 by Ammon Frey RN  Outcome: Ongoing  12/13/2020 1944 by Mariann Keenan RN  Outcome: Ongoing  Goal: Absence of new skin breakdown  Description: Absence of new skin breakdown  12/14/2020 0427 by Ammon Frey RN  Outcome: Ongoing  12/13/2020 1944 by Mariann Keenan RN  Outcome: Ongoing  Goal: Demonstration of wound healing without infection will improve  Description: Demonstration of wound healing without infection will improve  12/14/2020 0427 by Ammon Frey RN  Outcome: Ongoing  12/13/2020 1944 by Mariann Keenan RN  Outcome: Ongoing  Goal: Complications related to intravenous access or infusion will be avoided or minimized  Description: Complications related to intravenous access or infusion will be avoided or minimized Goal: Diagnostic test results will improve  Description: Diagnostic test results will improve  12/14/2020 0427 by Jeanmarie Juarez RN  Outcome: Ongoing  12/13/2020 1944 by Ciro Prakash RN  Outcome: Ongoing  Goal: Will remain free from infection  Description: Will remain free from infection  12/14/2020 0427 by Jeanmarie Juarez RN  Outcome: Ongoing  12/13/2020 1944 by Ciro Prakash RN  Outcome: Ongoing     Problem: Nutritional:  Goal: Nutritional status will improve  Description: Nutritional status will improve  12/14/2020 0427 by Jeanmarie Juarez RN  Outcome: Ongoing  Note: An NGT has been established as an access to provide nutrition to patient. Will continue to monitor.   12/13/2020 1944 by Ciro Prakash RN  Outcome: Ongoing     Problem: Respiratory:  Goal: Ability to maintain normal respiratory secretions will improve  Description: Ability to maintain normal respiratory secretions will improve  12/14/2020 0427 by Jeanmarie Juarez RN  Outcome: Ongoing  12/13/2020 1944 by Ciro Prakash RN  Outcome: Ongoing     Problem: HEMODYNAMIC STATUS  Goal: Patient has stable vital signs and fluid balance  12/14/2020 0427 by Jeanmarie Juarez RN  Outcome: Ongoing  12/13/2020 1944 by Ciro Prakash RN  Outcome: Ongoing     Problem: ACTIVITY INTOLERANCE/IMPAIRED MOBILITY  Goal: Mobility/activity is maintained at optimum level for patient  12/14/2020 0427 by Jeanmarie Juarez RN  Outcome: Ongoing  12/13/2020 1944 by Ciro Prakash RN  Outcome: Ongoing     Problem: COMMUNICATION IMPAIRMENT  Goal: Ability to express needs and understand communication  12/14/2020 0427 by Jeanmarie Juarez RN  Outcome: Ongoing  12/13/2020 1944 by Ciro Prakash RN  Outcome: Ongoing     Problem: Nutrition  Goal: Optimal nutrition therapy  Description: Nutrition Problem #1: Inadequate oral intake Intervention: Food and/or Nutrient Delivery: Continue Current Diet, Start Oral Nutrition Supplement  Nutritional Goals: po intake greater than 50%     12/14/2020 0427 by Juany Armstrong RN  Outcome: Ongoing  12/13/2020 1944 by Carri Yun RN  Outcome: Ongoing     Problem: Musculor/Skeletal Functional Status  Goal: Highest potential functional level  12/14/2020 0427 by Juany Armstrong RN  Outcome: Ongoing  12/13/2020 1944 by Carri Yun RN  Outcome: Ongoing  Goal: Absence of falls  12/14/2020 0427 by Juany Armstrong RN  Outcome: Ongoing  12/13/2020 1944 by Carri Yun RN  Outcome: Ongoing     Problem: Restraint Use - Nonviolent/Non-Self-Destructive Behavior:  Goal: Absence of restraint indications  Description: Absence of restraint indications  12/14/2020 0427 by Juany Armstrong RN  Outcome: Ongoing  12/13/2020 1944 by Carri Yun RN  Outcome: Not Met This Shift  Goal: Absence of restraint-related injury  Description: Absence of restraint-related injury  12/14/2020 0427 by Juany Armstrong RN  Outcome: Ongoing  Note: Restraints were removed and ROM performed. Will continue to monitor.   12/13/2020 1944 by Carri Yun RN  Outcome: Ongoing

## 2020-12-14 NOTE — PROGRESS NOTES
Writer contacted Dr. Oscar Pierre regarding placement of an Art Line due to inability to obtain consistent, accurate blood pressures.

## 2020-12-14 NOTE — PLAN OF CARE
Nutrition Problem #1: Severe malnutrition  Intervention: Food and/or Nutrient Delivery: Continue NPO, Start Tube Feeding  Nutritional Goals: Nutritional provision to meet % of estimated needs

## 2020-12-14 NOTE — PLAN OF CARE
Problem: Respiratory:  Goal: Ability to maintain normal respiratory secretions will improve  Description: Ability to maintain normal respiratory secretions will improve  12/14/2020 0553 by Scout Castro RCP  Outcome: Ongoing     Problem: MECHANICAL VENTILATION  Goal: Patient will maintain patent airway  Outcome: Ongoing     Problem: MECHANICAL VENTILATION  Goal: Oral health is maintained or improved  Outcome: Ongoing     Problem: MECHANICAL VENTILATION  Goal: ET tube will be managed safely  Outcome: Ongoing

## 2020-12-14 NOTE — PLAN OF CARE
Problem: Falls - Risk of:  Goal: Will remain free from falls  Description: Will remain free from falls  12/14/2020 1712 by Kevan Tanner RN  Outcome: Ongoing  12/14/2020 0427 by Grace Schofield RN  Outcome: Ongoing  Goal: Absence of physical injury  Description: Absence of physical injury  12/14/2020 1712 by Kevan Tanner RN  Outcome: Ongoing  12/14/2020 0427 by Grace Schofield RN  Outcome: Ongoing     Problem: Pain:  Goal: Pain level will decrease  Description: Pain level will decrease  12/14/2020 1712 by Kevan Tanner RN  Outcome: Ongoing  12/14/2020 0427 by Grace Schofield RN  Outcome: Ongoing  Goal: Control of acute pain  Description: Control of acute pain  12/14/2020 1712 by Kevan Tanner RN  Outcome: Ongoing  12/14/2020 0427 by Grace Schofield RN  Outcome: Ongoing  Goal: Control of chronic pain  Description: Control of chronic pain  12/14/2020 1712 by Kevan Tanner RN  Outcome: Ongoing  12/14/2020 0427 by Grace Schofield RN  Outcome: Ongoing     Problem: Skin Integrity:  Goal: Will show no infection signs and symptoms  Description: Will show no infection signs and symptoms  12/14/2020 1712 by Kevan Tanner RN  Outcome: Ongoing  12/14/2020 0427 by Grace Schofield RN  Outcome: Ongoing  Goal: Absence of new skin breakdown  Description: Absence of new skin breakdown  12/14/2020 1712 by Kevan Tanner RN  Outcome: Ongoing  12/14/2020 0427 by Grace Schofield RN  Outcome: Ongoing  Goal: Demonstration of wound healing without infection will improve  Description: Demonstration of wound healing without infection will improve  12/14/2020 1712 by Kevan Tanner RN  Outcome: Ongoing  12/14/2020 0427 by Grace Schofield RN  Outcome: Ongoing  Goal: Complications related to intravenous access or infusion will be avoided or minimized  Description: Complications related to intravenous access or infusion will be avoided or minimized 12/14/2020 1712 by Tasha Fraga RN  Outcome: Ongoing  12/14/2020 0427 by Marijane Klinefelter, RN  Outcome: Ongoing     Problem: Cardiac:  Goal: Ability to maintain vital signs within normal range will improve  Description: Ability to maintain vital signs within normal range will improve  12/14/2020 1712 by Tasha Fraga RN  Outcome: Ongoing  12/14/2020 0427 by Marijane Klinefelter, RN  Outcome: Ongoing  Goal: Cardiovascular alteration will improve  Description: Cardiovascular alteration will improve  12/14/2020 1712 by Tasha Fraga RN  Outcome: Ongoing  12/14/2020 0427 by Marijane Klinefelter, RN  Outcome: Ongoing     Problem: Health Behavior:  Goal: Will modify at least one risk factor affecting health status  Description: Will modify at least one risk factor affecting health status  12/14/2020 1712 by Tasha Fraga RN  Outcome: Ongoing  12/14/2020 0427 by Marijane Klinefelter, RN  Outcome: Ongoing  Goal: Identification of resources available to assist in meeting health care needs will improve  Description: Identification of resources available to assist in meeting health care needs will improve  12/14/2020 1712 by Tasha Fraga RN  Outcome: Ongoing  12/14/2020 0427 by Marijane Klinefelter, RN  Outcome: Ongoing     Problem: Physical Regulation:  Goal: Ability to maintain vital signs within normal range will improve  Description: Ability to maintain vital signs within normal range will improve  12/14/2020 1712 by Tasha Fraga RN  Outcome: Ongoing  12/14/2020 0427 by Marijane Klinefelter, RN  Outcome: Ongoing  Goal: Complications related to the disease process, condition or treatment will be avoided or minimized  Description: Complications related to the disease process, condition or treatment will be avoided or minimized  12/14/2020 1712 by Tasha Fraga RN  Outcome: Ongoing  12/14/2020 0427 by Marijane Klinefelter, RN  Outcome: Ongoing Goal: Diagnostic test results will improve  Description: Diagnostic test results will improve  12/14/2020 1712 by Tony Pedraza RN  Outcome: Ongoing  12/14/2020 0427 by Conchita Staton RN  Outcome: Ongoing  Goal: Will remain free from infection  Description: Will remain free from infection  12/14/2020 1712 by Tony Pedraza RN  Outcome: Ongoing  12/14/2020 0427 by Conchita Staton RN  Outcome: Ongoing     Problem: Nutritional:  Goal: Nutritional status will improve  Description: Nutritional status will improve  12/14/2020 1712 by Tony Pedraza RN  Outcome: Ongoing  12/14/2020 0427 by Conchita Staton RN  Outcome: Ongoing  Note: An NGT has been established as an access to provide nutrition to patient. Will continue to monitor.      Problem: Respiratory:  Goal: Ability to maintain normal respiratory secretions will improve  Description: Ability to maintain normal respiratory secretions will improve  12/14/2020 1712 by Tony Pedraza RN  Outcome: Ongoing  12/14/2020 0553 by Reggie Villareal RCP  Outcome: Ongoing  12/14/2020 0427 by Conchita Staton RN  Outcome: Ongoing     Problem: HEMODYNAMIC STATUS  Goal: Patient has stable vital signs and fluid balance  12/14/2020 1712 by Tony Pedraza RN  Outcome: Ongoing  12/14/2020 0427 by Conchita Staton RN  Outcome: Ongoing     Problem: ACTIVITY INTOLERANCE/IMPAIRED MOBILITY  Goal: Mobility/activity is maintained at optimum level for patient  12/14/2020 1712 by Tony Pedraza RN  Outcome: Ongoing  12/14/2020 0427 by Conchita Staton RN  Outcome: Ongoing     Problem: COMMUNICATION IMPAIRMENT  Goal: Ability to express needs and understand communication  12/14/2020 1712 by Tony Pedraza RN  Outcome: Ongoing  12/14/2020 0427 by Conchita Staton RN  Outcome: Ongoing     Problem: Nutrition  Goal: Optimal nutrition therapy  Description: Nutrition Problem #1: Inadequate oral intake Intervention: Food and/or Nutrient Delivery: Continue Current Diet, Start Oral Nutrition Supplement  Nutritional Goals: po intake greater than 50%     12/14/2020 1712 by Jessica Phillips RN  Outcome: Ongoing  12/14/2020 0427 by Dana Becerra RN  Outcome: Ongoing     Problem: Musculor/Skeletal Functional Status  Goal: Highest potential functional level  12/14/2020 1712 by Jessica Phillips RN  Outcome: Ongoing  12/14/2020 0427 by Dana Becerra RN  Outcome: Ongoing  Goal: Absence of falls  12/14/2020 1712 by Jessica Phillips RN  Outcome: Ongoing  12/14/2020 0427 by Dana Becerra RN  Outcome: Ongoing     Problem: Restraint Use - Nonviolent/Non-Self-Destructive Behavior:  Goal: Absence of restraint indications  Description: Absence of restraint indications  12/14/2020 1712 by Jessica Phillips RN  Outcome: Met This Shift  Note: Patient no longer pulling at lines or tubes  12/14/2020 0427 by Dana Becerra RN  Outcome: Ongoing  Goal: Absence of restraint-related injury  Description: Absence of restraint-related injury  12/14/2020 1712 by Jessica Phillips RN  Outcome: Met This Shift  Note: Skin assessed and maintained. Restraints off.  12/14/2020 0427 by Dana Becerra RN  Outcome: Ongoing  Note: Restraints were removed and ROM performed. Will continue to monitor.      Problem: OXYGENATION/RESPIRATORY FUNCTION  Goal: Patient will maintain patent airway  12/14/2020 1712 by Jessica Phillips RN  Outcome: Ongoing  12/14/2020 0553 by Iraj Huynh RCP  Outcome: Ongoing  Goal: Patient will achieve/maintain normal respiratory rate/effort  Description: Respiratory rate and effort will be within normal limits for the patient  12/14/2020 1712 by Jessica Phillips RN  Outcome: Ongoing  12/14/2020 0553 by Iraj Huynh RCP  Outcome: Ongoing     Problem: MECHANICAL VENTILATION  Goal: Patient will maintain patent airway  12/14/2020 1712 by Jessica Phillips RN  Outcome: Ongoing 12/14/2020 0553 by Scout Castro RCP  Outcome: Ongoing  Goal: Oral health is maintained or improved  12/14/2020 1712 by Poli Staton RN  Outcome: Ongoing  12/14/2020 0553 by Scout Castro RCP  Outcome: Ongoing  Goal: ET tube will be managed safely  12/14/2020 1712 by Poli Staton RN  Outcome: Ongoing  12/14/2020 0553 by Scout Castro RCP  Outcome: Ongoing

## 2020-12-14 NOTE — CONSULTS
Comprehensive Nutrition Assessment    Type and Reason for Visit:  Reassess, Consult(Tube Feeding Ordering and Management)    Nutrition Recommendations/Plan: NPO. Initiate Osmolite 1.2 at 20 mL per hr with initial goal of 30 mL per hr which will provide: 864 kcal and 40 gm protein. Monitor for possible signs of refeeding syndrome. Nutrition Assessment:  Pt is on vent. Physician has approved start of tube feeding. Malnutrition Assessment:  Malnutrition Status:  Severe malnutrition    Context:  Acute Illness     Findings of the 6 clinical characteristics of malnutrition:  Energy Intake:  7 - 50% or less of estimated energy requirements for 5 or more days  Weight Loss:  Unable to assess     Body Fat Loss:  Unable to assess     Muscle Mass Loss:  Unable to assess    Fluid Accumulation:  7 - Moderate to Severe Extremities   Strength:  Not PerformedNot measured    Estimated Daily Nutrient Needs:  Energy (kcal):  1175 based on [facility] 2003b (calculated 12/14); Weight Used for Energy Requirements:  Admission     Protein (g):  64-73 based on 1.4-1.6 gm per kg; Weight Used for Protein Requirements:  Ideal          Nutrition Related Findings:  Edema: +2 pitting RUE, +4 pitting LUE; trace RLE, LLE. Na: 130, Glu: 181 (12/14); Phosphorus: 1.3, Glu: 110 (12/13). Precedex. Other labs and meds reviewed.       Wounds:  Multiple, Stage I, Stage II       Current Nutrition Therapies:    DIET TUBE FEED CONTINUOUS/CYCLIC NPO; STANDARD WITHOUT FIBER; Nasogastric; Continuous; 20; 30  Current Tube Feeding (TF) Orders:  · Feeding Route: Nasogastric  · Formula: Standard without Fiber  · Schedule: Continuous(start at 20 mL with initial goal of 30 mL per hr)  · Current TF & Flush Orders Provides: Initial goal of 30 mL per hr would provide: 864 kcal and 40 gm protein    Anthropometric Measures:  · Height: 5' (152.4 cm)  · Current Body Weight: 148 lb 9.4 oz (67.4 kg)

## 2020-12-14 NOTE — PLAN OF CARE
Problem: Restraint Use - Nonviolent/Non-Self-Destructive Behavior:  Goal: Absence of restraint indications  Description: Absence of restraint indications  12/14/2020 1821 by Remy Durán RN  Note: Patient intubated and restraints on to prevent tube pulling  12/14/2020 1712 by Deandra Shin RN  Outcome: Met This Shift  Note: Patient no longer pulling at lines or tubes  12/14/2020 0427 by Leon Bills RN  Outcome: Ongoing  Goal: Absence of restraint-related injury  Description: Absence of restraint-related injury  12/14/2020 1712 by Deandra Shin RN  Outcome: Met This Shift  Note: Skin assessed and maintained. Restraints off.  12/14/2020 0427 by Leon Bills RN  Outcome: Ongoing  Note: Restraints were removed and ROM performed. Will continue to monitor.

## 2020-12-14 NOTE — PROGRESS NOTES
[facility] -   Infectious diseases evaluation  admission date 12/3/2020    reason for consultation:   Healthcare acquired pneumonia  Impression :   Current:  · Acute respiratory failure required intubation  · Sepsis  · Shock septic versus cardiogenic  · Lactic acidosis  · Healthcare acquired pneumonia with cavitary right lower lobe mass/positive mycoplasma IgM. ·  STAPHYLOCOCCUS HOMINIS oxacillin sensitive bacteremia  · Pacemaker in place  · Positive MRSA swab  · Urinary retention  · Chronic CHF  · History of ESBL and MRSA infection    Other:  · Penicillin allergy    Recommendations     · Continue IV vancomycin and cefepime  · IV Flagyl was added  · Echocardiogram today showed ejection fraction of 20%  · Chest x-ray from earlier today reviewed showed bilateral pulmonary opacities with bilateral effusions  · proBNP  · Respiratory culture  · Follow blood culturs  · Follow CBC renal function  · Follow lactic acid  · CT chest without contrast was not done  · Continue supportive care  · Discussed with nursing staff          History of Present Illness:   Initial history:  Maren Gatica is a 65y.o.-year-old female with history of CVA was brought to the ER from her nursing home for low blood pressure  for 1 day associated with generalized weakness. The patient had poor IV access and was not able to get IV fluids at the nursing home. The patient was placed on Levophed initially that was weaned off  12/2/2020 WBC 12.6, creatinine normal,  980, lactic acid 1.3, COVID-19 rapid test was negative.   Blood cultures 12/3/2012 grew staph hominis methicillin susceptible, procalcitonin 0.08, mycoplasma IgM 1.22, C-reactive protein 156, nasal swab for MRSA was positive, respiratory molecular panel with Covid PCR was negative  Chest x-ray showed the left greater than right basilar opacities CT abdomen pelvis suggestive of enteritis,  lower lobe consolidation, cavitary 4.3 cm mass in the right lower lobe with a small air-fluid level. Failed swallow study, diet was changed  Echocardiogram 12/3/2020 showed no obvious vegetation  PICC line placed 12/8/2020  Interval history 12/14/2020  Respiratory status got worse, was intubated, remains is intubated sedated, on pressors  She is short of breath, occasional cough, bilateral lower extremity swelling and generalized body ache  Tran catheter changed 12/11/2020 urinalysis unremarkable    Procalcitonin 0.95 today, last lactic acid was 9.4, WBC 44.2, renal function was normal  Liver enzymes yesterday unremarkable, lipase 11  12/12/2020 blood cultures no growth to date  Patient Vitals for the past 8 hrs:   BP Temp Temp src Pulse Resp SpO2 Height   12/14/20 1200  97.6 °F (36.4 °C) Oral       12/14/20 1126       5' (1.524 m)   12/14/20 1125    86 13     12/14/20 1000      96 %    12/14/20 0800 (!) 104/58 98 °F (36.7 °C) Oral 84 17     12/14/20 0741    75 20 95 %    12/14/20 0739    78 16     12/14/20 0733    82 16 96 %    12/14/20 0700 114/78   88 16 100 %            I have personally reviewed the past medical history, past surgical history, medications, social history, and family history, and I haveupdated the database accordingly. Allergies:   Penicillins and Amoxicillin     Review of Systems:     Review of Systems  Intubated, unable to provide  Physical Examination :       Physical Exam  Constitutional:       Comments: Intubated   HENT:      Head: Normocephalic and atraumatic. Right Ear: External ear normal.      Left Ear: External ear normal.   Eyes:      General: No scleral icterus. Conjunctiva/sclera: Conjunctivae normal.   Neck:      Musculoskeletal: Neck supple. No neck rigidity. Cardiovascular:      Rate and Rhythm: Normal rate and regular rhythm. Heart sounds: No murmur.    Pulmonary: Breath sounds: Rhonchi present. No wheezing. Comments: Coarse breath sounds bilaterally  Abdominal:      Palpations: Abdomen is soft. Tenderness: There is no abdominal tenderness. Musculoskeletal:      Right lower leg: No edema. Left lower leg: No edema. Skin:     General: Skin is warm. Coloration: Skin is not jaundiced. Neurological:      Mental Status: She is alert. Comments: Intubated         Past Medical History:     Past Medical History:   Diagnosis Date    Abnormal computed tomography of cervical spine     sclerotic bone appearance     CVA (cerebral vascular accident) ([address]     left  side weakness    GERD (gastroesophageal reflux disease)     Hypertension     Paraproteinemia     Weight loss        Past Surgical  History:     Past Surgical History:   Procedure Laterality Date    ABDOMEN SURGERY N/A 5/25/2019    ABDOMEN DEBRIDEMENT WOUND CLOSURE REOPENING OF RECENT LAPOROATOMY, ABDOMINAL WASHOUT, REPAIR FASCIAL DEHISENCE WITH MESH performed by Nirav Moran DO at [facility] 5/15/2019    CHOLECYSTECTOMY performed by Nirav Moran DO at [address] [place] [address] 5/25/2019    GASTROSTOMY TUBE PLACEMENT performed by Nirav Moran DO at [address] [place] [address] N/A 7/27/2019    EGD ESOPHAGOGASTRODUODENOSCOPY WITH REMOVAL OF PEG TUBE performed by Nirav Moran DO at [address] [place] [address]  4/14/2019         LAPAROSCOPY N/A 5/15/2019    LAPAROSCOPY EXPLORATORY CONVERTED TO EXPLORATORY LAPAROTOMY/ RIGHT COLON RESECTION AND ANASTAMOSIS/ OPEN CHOLECYSTECTOMY/ EXTENSIVE LYSIS OF ADHESIONS performed by Nirav Moran DO at [place] [address]  07/2011    Pacemaker is Medtronic Revo (compatible). Leads placed in 1995 are NOT MRI compatible. Placed at [facility]. V's per Dr. Tasha Saenz can not have an MRI.     UPPER GASTROINTESTINAL ENDOSCOPY N/A 4/16/2019 EGD ESOPHAGOGASTRODUODENOSCOPY @ [facility]ED[facility]  ICU 2002 performed by Iglesia Hernandez MD at [address]       Medications:      metroNIDAZOLE  500 mg Intravenous Q8H    sodium phosphate IVPB  15 mmol Intravenous Once    albumin human  25 g Intravenous Q12H    sodium chloride  250 mL Intravenous Once    cefepime  2 g Intravenous Q12H    vancomycin  1,000 mg Intravenous Q24H    vancomycin (VANCOCIN) intermittent dosing (placeholder)   Other RX Placeholder    hydrocortisone sodium succinate PF  100 mg Intravenous Q8H    chlorhexidine  15 mL Mouth/Throat BID    sodium chloride  500 mL Intravenous Once    pantoprazole  40 mg Intravenous Daily    And    sodium chloride (PF)  10 mL Intravenous Daily    sodium chloride flush  10 mL Intravenous 2 times per day    potassium chloride  40 mEq Oral BID WC    ferrous sulfate  325 mg Oral BID WC    [Held by provider] furosemide  20 mg Intravenous Daily    traZODone  50 mg Oral Nightly    [Held by provider] metoprolol tartrate  50 mg Oral BID    budesonide-formoterol  2 puff Inhalation BID    divalproex  125 mg Oral BID    sertraline  50 mg Oral Daily    mirtazapine  7.5 mg Oral Nightly    levothyroxine  125 mcg Oral Daily    baclofen  10 mg Oral BID    sodium chloride flush  10 mL Intravenous 2 times per day    [Held by provider] enoxaparin  40 mg Subcutaneous Daily       Social History:     Social History     Socioeconomic History    Marital status:       Spouse name: Not on file    Number of children: Not on file    Years of education: Not on file    Highest education level: Not on file   Occupational History    Not on file   Social Needs    Financial resource strain: Not on file    Food insecurity     Worry: Not on file     Inability: Not on file    Transportation needs     Medical: Not on file     Non-medical: Not on file   Tobacco Use    Smoking status: Former Smoker     Packs/day: 1.00     Years: 30.00     Pack years: 30.00 No results for input(s): HIV in the last 72 hours. No results for input(s): BC in the last 72 hours.   Lab Results   Component Value Date    CREATININE 0.94 12/14/2020    GLUCOSE 181 12/14/2020    GLUCOSE 87 05/21/2012       Detailed results:    Ct Chest Wo Contrast     Result Date: 12/6/2020 EXAMINATION: CT OF THE CHEST WITHOUT CONTRAST 12/4/2020 3:08 pm TECHNIQUE: CT of the chest was performed without the administration of intravenous contrast. Multiplanar reformatted images are provided for review. Dose modulation, iterative reconstruction, and/or weight based adjustment of the mA/kV was utilized to reduce the radiation dose to as low as reasonably achievable. COMPARISON: Chest radiograph 12/03/2020, CT abdomen and pelvis 12/03/2020. HISTORY: ORDERING SYSTEM PROVIDED HISTORY: pneumonia TECHNOLOGIST PROVIDED HISTORY: pneumonia Reason for Exam: f/u pneumonia Acuity: Unknown Type of Exam: Unknown FINDINGS: Mediastinum: Suspected 17 mm lymph node posterior to the left medial clavicle appears to have increased in size from a 2012 chest CT. No other definite lymphadenopathy. No pericardial effusion. Coronary artery calcifications are seen. The thoracic aorta is nonaneurysmal.  The main pulmonary artery is not significantly dilated. Right IJ CVC catheter tip is in the SVC. There is a left chest wall cardiac device with multiple leads. Lungs/pleura: The central airways are patent. Small bilateral pleural effusions. No pneumothorax is seen. Emphysematous changes are seen in the lungs. Similar appearance to the CT abdomen and pelvis from 12/03/2020, there is a cavitary mass in the right lower lobe abutting the pleura, measuring 4.6 x 4.4 x 3.5 cm. There is dependent atelectasis in the right lung. There is a rounded opacity in the left lower lobe broadly abutting the pleura. Additional airspace opacities seen in the left lower lobe. There are also areas of atelectasis in the left lingula and left lower lobe. 3 mm right upper lobe pulmonary nodule (series 601, image 60). Upper Abdomen: Limited images of the upper abdomen are unremarkable. 4 mm hypodensity in the liver is too small to characterize but also seen on the comparison abdomen CT (series 2, image 111).  Soft Tissues/Bones: No acute bony abnormality. There are old left-sided rib fractures.      Approximately 4.6 x 4.4 x 3.5 cm cavitary right lower lobe mass, similar to the recent abdomen and pelvis CT from 12/03/2020. This is new from a CT dated 07/25/2019. This could represent a cavitary pneumonia however a primary lung neoplasm is not excluded. Recommend short-term follow-up CT after treatment to demonstrate interval change. Rounded opacity in the left lower lobe broadly abutting the pleura could represent rounded atelectasis or infection. Additional airspace opacities in the left lower lobe are suspicious for infection. This area can also be followed up with CT. Small bilateral pleural effusions. 3 mm right upper lobe pulmonary nodule. Follow-up recommendations are below. Suspected enlarged lymph node in the left upper mediastinum posterior to the medial left clavicle, nonspecific. RECOMMENDATIONS: Fleischner Society guidelines for follow-up and management of incidentally detected pulmonary nodules: Nodule size less than 6 mm In a low-risk patient, no routine follow-up. In a high-risk patient, optional CT at 12 months. - Low risk patients include individuals with minimal or absent history of smoking and other known risk factors. - High risk patients include individuals with a history or smoking or known risk factors. Radiology 2017 http://pubs. rsna.org/doi/full/10.1148/radiol. 1111993242      Ct Abdomen Pelvis W Iv Contrast Additional Contrast? None       Thank you for allowing us to participate in the care of this patient. Please call with questions. This note is created with the assistance of a speech recognition program.  While intending to generate adocument that actually reflects the content of the visit, the document can still have some errors including those of syntax and sound a like substitutions which may escape proof reading. It such instances, actual meaningcan be extrapolated by contextual diversion.     Lucas Dean MD

## 2020-12-14 NOTE — PROGRESS NOTES
[address] [facility].    PROGRESS NOTE             12/14/2020    12:26 PM    Name:   Harriett Brady  MRN:     [id number]     Acct:      [de-identified]   Room:   2008/2008-01  IP Day:  6  Admit Date:  12/3/2020  1:20 AM    PCP:  Desire Prado MD  Code Status:  Full Code    Subjective:     C/C:   Chief Complaint   Patient presents with    Fatigue     Interval History Status: significantly worsened. Patient seen and examined at bedside. Patient requiring increased ventilator settings on vasopressors overnight. Ventilator settings adjusted per pulmonology recommendations, interval arterial blood gas values improved. Unable to place arterial line overnight due to multiple failed attempts while using ultrasound guidance. Continue to provide hemodynamic support and treatment for sepsis, prognosis guarded. Brief History:     20-year-old female presenting from extended care facility with hypotension.    Initially treated for staph epidermidis sepsis secondary to indwelling subcutaneous catheters in abdomen.  Right-sided pulmonary cavitary lesion found on CT. patient developed signs and symptoms of sepsis during her stay and required transportation to the ICU for treatment of hospital-acquired pneumonia. Patient continues to decompensate despite full support efforts. Review of Systems:     Review of Systems   Unable to perform ROS: Intubated         Medications: Allergies:     Allergies   Allergen Reactions    Penicillins Shortness Of Breath and Rash    Amoxicillin        Current Meds:   Scheduled Meds:    metroNIDAZOLE  500 mg Intravenous Q8H    sodium phosphate IVPB  15 mmol Intravenous Once    sodium chloride  500 mL Intravenous Once    albumin human  25 g Intravenous Q12H    cefepime  2 g Intravenous Q12H    vancomycin  1,000 mg Intravenous Q24H  vancomycin (VANCOCIN) intermittent dosing (placeholder)   Other RX Placeholder    hydrocortisone sodium succinate PF  100 mg Intravenous Q8H    chlorhexidine  15 mL Mouth/Throat BID    sodium chloride  500 mL Intravenous Once    pantoprazole  40 mg Intravenous Daily    And    sodium chloride (PF)  10 mL Intravenous Daily    sodium chloride flush  10 mL Intravenous 2 times per day    potassium chloride  40 mEq Oral BID WC    ferrous sulfate  325 mg Oral BID WC    [Held by provider] furosemide  20 mg Intravenous Daily    traZODone  50 mg Oral Nightly    [Held by provider] metoprolol tartrate  50 mg Oral BID    budesonide-formoterol  2 puff Inhalation BID    divalproex  125 mg Oral BID    sertraline  50 mg Oral Daily    mirtazapine  7.5 mg Oral Nightly    levothyroxine  125 mcg Oral Daily    baclofen  10 mg Oral BID    sodium chloride flush  10 mL Intravenous 2 times per day    [Held by provider] enoxaparin  40 mg Subcutaneous Daily     Continuous Infusions:    norepinephrine      sodium bicarbonate infusion 100 mL/hr at 12/14/20 0959    vasopressin (Septic Shock) infusion 0.02 Units/min (12/14/20 1020)    fentaNYL Stopped (12/14/20 0748)    dexmedetomidine Stopped (12/14/20 0742)    phenylephrine (JAIR-SYNEPHRINE) 50mg/250mL infusion 200 mcg/min (12/14/20 1218)     PRN Meds: morphine, sodium chloride flush, promethazine (PHENERGAN) in sodium chloride 0.9% IVPB, HYDROcodone 5 mg - acetaminophen, perflutren lipid microspheres, sodium chloride flush, ipratropium-albuterol, sodium chloride flush, potassium chloride **OR** potassium alternative oral replacement **OR** potassium chloride, magnesium sulfate, acetaminophen **OR** acetaminophen, polyethylene glycol, promethazine **OR** ondansetron    Data: [facility] Transthoracic Echocardiography Report (TTE)  Patient Name 167Janet Ramirez     Date of Study                 12/04/2020               Kory Lyons   Date of      1950  Gender                        Female  Birth   Age          65 year(s)  Race                             Room Number  2098        Height:                       59.84 inch, 152 cm   Corporate ID [id number]    Weight:                       128 pounds, 58 kg  #   Patient Acct [de-identified]   BSA:           1.54 m^2       BMI:      25.11  #                                                                kg/m^2   MR #         [id number]      Sonographer                   Zoila Torres   Accession #  [id number]  Interpreting Physician        Ashly Haynes   Fellow                   Referring Nurse Practitioner   Interpreting             Referring Physician           Billie Rodriguez *  Fellow  Type of Study   TTE procedure:2D Echocardiogram, M-Mode, Doppler, Color Doppler. Procedure Date Date: 12/04/2020 Start: 11:01 AM Study Location: [facility] Technical Quality: Fair visualization Indications:Positive blood cultures. History / Tech. Comments: COPD CVA PACER Patient Status: Inpatient Height: 59.84 inches Weight: 127.88 pounds BSA: 1.54 m^2 BMI: 25.11 kg/m^2 Rhythm: Within normal limits HR: 85 bpm BP: 143/58 mmHg CONCLUSIONS Summary Small LV cavity. Normal left ventricular ejection fraction (>55%). Normal wall motions. Mild-moderate left ventricular hypertrophy. Normal right ventricle size and function. Pacemaker lead seen in right heart chambers. No obvious vegetation seen. Aortic valve is trileaflet. No aortic insufficiency. Normal mitral valve structure and function. Mild mitral regurgitation. Normal tricuspid valve structure and function. Mild tricuspid regurgitation. Estimated right ventricular systolic pressure is 42 mmHg. Mildly elevated right ventricular systolic pressure. Pulmonic valve not well visualized.  No pulmonic insufficiency. No obvious valvular vegetation visualized on this study. Trivial pericardial effusion. Signature ----------------------------------------------------------------------------  Electronically signed by Zoila Torres(Sonographer) on 2020 01:01  PM ---------------------------------------------------------------------------- ----------------------------------------------------------------------------  Electronically signed by Zackery Marrero(Interpreting physician) on  2020 02:55 PM ---------------------------------------------------------------------------- FINDINGS Left Atrium Left atrium is normal in size. Left Ventricle Small LV cavity. Normal left ventricular ejection fraction (>55%). Normal wall motions. Mild-moderate left ventricular hypertrophy. Right Atrium Pacing lead seen in the right atrium. Right atrium is normal size . Right Ventricle Pacemaker lead seen in right ventricle. Normal right ventricle size and function. Mitral Valve Normal mitral valve structure and function. Mild mitral regurgitation. Aortic Valve Aortic valve is trileaflet. No aortic insufficiency. No aortic stenosis. Tricuspid Valve Normal tricuspid valve structure and function. Mild tricuspid regurgitation. Estimated right ventricular systolic pressure is 42 mmHg. Mildly elevated right ventricular systolic pressure. Pulmonic Valve Pulmonic valve not well visualized. No pulmonic insufficiency. Pericardial Effusion Trivial pericardial effusion. Miscellaneous Normal aortic root dimension. E/e' average 12.5 IVC normal diameter & inspiratory collapse indicating normal RA filling pressure .  M-mode / 2D Measurements & Calculations:   LVIDd:3.33 cm(3.7 - 5.6 cm)      Diastolic ZKTAHA:63.4 ml  EIDYR:9.66 cm(2.2 - 4.0 cm)      Systolic ZUYCQN:61.2 ml  OYBQ:4.84 cm(0.6 - 1.1 cm)       Aortic Root:3.6 cm(2.0 - 3.7 cm)  LVPWd:1.32 cm(0.6 - 1.1 cm)      LA Dimension: 2.01 cm(1.9 - 4.0 cm)  Fractional Shortenin.34 %    LA volume/Index: 17.2 ml /11m^2  Calculated LVEF (%): 73.35 %     LVOT:2.4 cm   Mitral:                                Aortic   Peak E-Wave: 0.77 m/s                  Peak Velocity: 1.75 m/s  Peak A-Wave: 1.09 m/s                  Mean Velocity: 1.21 m/s  E/A Ratio: 0.7                         Peak Gradient: 12.25 mmHg  Peak Gradient: 2.35 mmHg               Mean Gradient: 7 mmHg  Mean Gradient: 3 mmHg  Deceleration Time: 123 msec                                         Area (continuity): 3.9 cm^2                                         AV VTI: 29.6 cm   Area (continuity): 4.4 cm^2  Mean Velocity: 0.73 m/s   Tricuspid:                             Pulmonic:   Estimated RVSP: 42 mmHg  Peak TR Velocity: 3.12 m/s  Peak TR Gradient: 38.9376 mmHg  Estimated RA Pressure: 3 mmHg                                         Estimated PASP: 41.94 mmHg  Septal Wall E' velocity:0.06 m/s Lateral Wall E' velocity:0.07 m/s    Xr Ankle Right (2 Views)    Result Date: 12/9/2020  EXAMINATION: 3 XRAY VIEWS OF THE RIGHT FOOT; 3 XRAY VIEWS OF THE RIGHT ANKLE 12/9/2020 1:30 pm COMPARISON: None. HISTORY: ORDERING SYSTEM PROVIDED HISTORY: foot pain TECHNOLOGIST PROVIDED HISTORY: foot pain Reason for Exam: foot pain. best images per pt condition Acuity: Acute Type of Exam: Initial; ORDERING SYSTEM PROVIDED HISTORY: foot pain TECHNOLOGIST PROVIDED HISTORY: foot pain Reason for Exam: foot pain. best images per pt condition FINDINGS: Right foot: Mottled osteopenia. Anatomic alignment. No acute fracture. The joint spaces appear normal. Left ankle: Anatomic alignment. No fracture. Mottled osteopenia. 1. No acute bony or joint abnormality 2.  Mottled osteopenia     Xr Foot Right (2 Views)    Result Date: 12/9/2020 EXAMINATION: 3 XRAY VIEWS OF THE RIGHT FOOT; 3 XRAY VIEWS OF THE RIGHT ANKLE 12/9/2020 1:30 pm COMPARISON: None. HISTORY: ORDERING SYSTEM PROVIDED HISTORY: foot pain TECHNOLOGIST PROVIDED HISTORY: foot pain Reason for Exam: foot pain. best images per pt condition Acuity: Acute Type of Exam: Initial; ORDERING SYSTEM PROVIDED HISTORY: foot pain TECHNOLOGIST PROVIDED HISTORY: foot pain Reason for Exam: foot pain. best images per pt condition FINDINGS: Right foot: Mottled osteopenia. Anatomic alignment. No acute fracture. The joint spaces appear normal. Left ankle: Anatomic alignment. No fracture. Mottled osteopenia. 1. No acute bony or joint abnormality 2. Mottled osteopenia     Xr Acute Abd Series Chest 1 Vw    Result Date: 12/12/2020  EXAMINATION: TWO XRAY VIEWS OF THE ABDOMEN AND SINGLE  XRAY VIEW OF THE CHEST 12/12/2020 5:02 pm COMPARISON: May 28, 2019 HISTORY: ORDERING SYSTEM PROVIDED HISTORY: r/o SBO TECHNOLOGIST PROVIDED HISTORY: r/o SBO Reason for Exam: Irregular heart rate poss SBO Acuity: Acute Type of Exam: Initial Additional signs and symptoms: Irregular heart rate poss SBO FINDINGS: Bipolar pacer on the left. Right PICC line unchanged. Bibasilar airspace disease. Mild edema. No subdiaphragmatic air. Small effusions bilaterally. Gas-filled loops of small bowel. Retained oral contrast versus calcific lesion in the pelvis. Tran catheter in the bladder. Diffuse demineralization. Ileus. Bibasilar airspace disease.      Ct Chest Wo Contrast    Result Date: 12/6/2020 EXAMINATION: CT OF THE ABDOMEN AND PELVIS WITH CONTRAST 12/3/2020 2:49 am TECHNIQUE: CT of the abdomen and pelvis was performed with the administration of intravenous contrast. Multiplanar reformatted images are provided for review. Dose modulation, iterative reconstruction, and/or weight based adjustment of the mA/kV was utilized to reduce the radiation dose to as low as reasonably achievable. COMPARISON: August 13, 2019. HISTORY: ORDERING SYSTEM PROVIDED HISTORY: global abdominal pain TECHNOLOGIST PROVIDED HISTORY: global abdominal pain Reason for Exam: Fatigue, abd pain. Due to patient condition, unable to have patient lay flat  or bring arms above head Acuity: Acute Type of Exam: Initial Relevant Medical/Surgical History: History, Gtube/peg tube placement, cholecystectomy FINDINGS: Lower Chest: Partially visualized left lower lobe heterogeneous consolidation. Trace left pleural fluid with pleural thickening. Right lower lobe 4.2 x 3.3 cm masslike opacity with central necrosis, central air-fluid level and mild surrounding heterogeneous/nodular opacities. Emphysema. Partially visualized cardiac pacer leads. Atherosclerosis of the visualized thoracic aorta. Liver: Few scattered subcentimeter hypodensities in the liver are similar to the prior study and likely represent benign cysts. No new suspicious liver mass. Smooth liver contour. Gallbladder and Bile Ducts: Prior cholecystectomy. Spleen: Normal. Adrenal Glands: Normal. Pancreas: Normal. Genitourinary: Normal. Bowel: The stomach is incompletely distended and not well evaluated. Postsurgical changes of the bowel. Normal caliber bowel. Fluid throughout the colon. Vasculature: Atherosclerosis. No abdominal aortic aneurysm. Patent main portal vein. Bones and Soft Tissues: Osteopenia. Degenerative changes in the visualized spine and pelvis. L3 superior endplate 76-18% height loss was not present on the prior study but appears to be chronic. Retroperitoneum/Mesentery: No intraperitoneal free air, ascites or fluid collection. No lymphadenopathy in the abdomen or pelvis. Fluid throughout the colon suggests enteritis. No bowel obstruction. Postsurgical changes of the bowel. Partially visualized left lower lobe heterogeneous consolidation with trace left basilar pleural fluid and pleural thickening. There is also a cavitary 4.3 cm mass in the right lower lobe which demonstrates a small air-fluid level. Findings may relate to infection. Underlying neoplastic process is not excluded. Short-term follow-up chest CT may be helpful for further evaluation.      Ir Lamestrella Hammers Device Plmt/replace/removal    Result Date: 12/8/2020 PROCEDURE: ULTRASOUND GUIDED VASCULAR ACCESS. FLUOROSCOPY GUIDED PICC PLACEMENT 12/8/2020. HISTORY: ORDERING SYSTEM PROVIDED HISTORY: Long term antibiotic therapy TECHNOLOGIST PROVIDED HISTORY: Long term antibiotic therapy Pneumonia SEDATION: None FLUOROSCOPY DOSE AND TYPE OR TIME AND EXPOSURES: 1 minutes; D  cGy cm2 TECHNIQUE: This procedure was performed by Dr. Sara Correa. Informed consent was obtained after a detailed explanation of the procedure including risks, benefits, and alternatives. Universal protocol was observed. The right arm was prepped and draped in sterile fashion using maximum sterile barrier technique. Local anesthesia was achieved with lidocaine. A micropuncture needle was used to access the right brachial vein using ultrasound guidance. An ultrasound image demonstrating patency of the vein with needle tip located within it. An image was obtained and stored in PACs. A 0.018 guidewire was used to place a peel-a-way sheath and a 5 Danish dual-lumen PICC was advanced with fluoroscopic guidance with the tip at the cavo-atrial junction. The catheter flushed easily and there was a good blood return. The catheter was secured to the skin. The patient tolerated the procedure well and there were no immediate complications. EBL: Less than 3 mL FINDINGS: Fluoroscopic image demonstrates the tip of the catheter at the cavo-atrial junction.      Successful ultrasound and fluoroscopy guided PICC placement     Xr Chest Portable    Result Date: 12/14/2020 EXAMINATION: ONE XRAY VIEW OF THE CHEST 12/14/2020 6:09 am COMPARISON: Chest radiograph performed 12/13/2020. HISTORY: ORDERING SYSTEM PROVIDED HISTORY: ETT placement while on vent TECHNOLOGIST PROVIDED HISTORY: ETT placement while on vent Reason for Exam: SOB Acuity: Chronic Type of Exam: Ongoing FINDINGS: There are scattered bilateral pulmonary opacities. There are bilateral effusions. There is no pneumothorax. The mediastinal structures are stable with stable cardiac leads. The upper abdomen is unremarkable. The extrathoracic soft tissues are unremarkable. There is an endotracheal tube with tip in the midtrachea in stable position. There is a gastric tube and the tip is not visualized. There is a right-sided PICC line with the tip in the proximal SVC. Scattered bilateral pulmonary opacities with bilateral effusions. Support tubes as described above. Xr Chest Portable    Result Date: 12/13/2020  EXAMINATION: ONE XRAY VIEW OF THE CHEST 12/13/2020 7:41 pm COMPARISON: December 13, 2020 HISTORY: ORDERING SYSTEM PROVIDED HISTORY: Intubation TECHNOLOGIST PROVIDED HISTORY: Intubation Reason for Exam: intubation Acuity: Acute Type of Exam: Initial FINDINGS: New ET tube terminates 4 cm above the ron. Enteric tube in the stomach. Bipolar pacer on the left. PICC line on the right terminates in the superior vena cava. Heart and mediastinum normal.  Moderate diffuse interstitial infiltrates unchanged. Bony thorax intact. New ET and enteric tubes as above. Interstitial infiltrates unchanged.      Xr Chest Portable    Result Date: 12/13/2020 EXAMINATION: ONE XRAY VIEW OF THE CHEST 12/13/2020 5:41 pm COMPARISON: December 13, 2020 HISTORY: ORDERING SYSTEM PROVIDED HISTORY: worsening respiratory status TECHNOLOGIST PROVIDED HISTORY: worsening respiratory status Reason for Exam: worsening respiratory status Acuity: Acute Type of Exam: Initial Additional signs and symptoms: worsening respiratory status FINDINGS: Right greater than left airspace disease. Bipolar pacer on the left unchanged. Heart and mediastinum normal.  Small left effusion unchanged. Bony thorax intact. Moderately severe airspace disease increased     Xr Chest Portable    Result Date: 12/13/2020  EXAMINATION: ONE XRAY VIEW OF THE CHEST 12/13/2020 9:17 am COMPARISON: 12/10/2020, 1802 hours HISTORY: ORDERING SYSTEM PROVIDED HISTORY: acute hypoxic respiratory failure, suspicion for hospital aquired pneumonia. TECHNOLOGIST PROVIDED HISTORY: acute hypoxic respiratory failure, suspicion for hospital aquired pneumonia. Reason for Exam: acute hypoxic respiratory failure, suspicion for hospital aquired pneumonia Acuity: Acute Additional signs and symptoms: acute hypoxic respiratory failure, suspicion for hospital aquired pneumonia 38-year-old female with acute hypoxic respiratory failure FINDINGS: Portable AP upright view of the chest. Left-sided subclavian approach cardiac pacemaker device in place. Cardiac monitor leads overlie the chest. Right-sided PICC distal tip overlying the cavoatrial junction. Atherosclerotic calcification of the aortic knob. Stable cardiomegaly. Cardiac and mediastinal contours remain unchanged. Trachea midline. No pneumothorax. No free air. Stable small bilateral pleural effusions and bilateral mid and lower zone predominant airspace disease. Underlying COPD. Mild pulmonary vascular congestion. Visualized osseous structures remain unchanged. 1. Stable small bilateral pleural effusions and bilateral mid/lower zone predominant airspace disease. 2. Underlying COPD. 3. Mild pulmonary vascular congestion. Stable cardiomegaly. 4. Right-sided PICC distal tip overlying the cavoatrial junction. Xr Chest Portable    Result Date: 12/10/2020  EXAMINATION: ONE XRAY VIEW OF THE CHEST 12/10/2020 6:10 pm COMPARISON: December 8, 2020 HISTORY: ORDERING SYSTEM PROVIDED HISTORY: PNA; copious sputum TECHNOLOGIST PROVIDED HISTORY: PNA; copious sputum Reason for Exam: PNA; copious sputum Acuity: Acute Type of Exam: Subsequent/Follow-up Additional signs and symptoms: PNA; copious sputum FINDINGS: Bipolar pacer on the left unchanged. New PICC line on the right terminates 3 cm below the atrial caval junction. Right lower lobe airspace disease. Small bilateral pleural effusions. Heart and mediastinum normal.  Bony thorax intact. New PICC line on the right as above. No change right lower lobe airspace disease. No change bilateral pleural effusions.      Xr Chest Portable    Result Date: 12/8/2020 EXAMINATION: ONE XRAY VIEW OF THE CHEST 12/8/2020 9:30 am COMPARISON: 8 December 2020 at 826 hours HISTORY: ORDERING SYSTEM PROVIDED HISTORY: r/o pneumothorax R-side. inconclusive on recent prior exam TECHNOLOGIST PROVIDED HISTORY: r/o pneumothorax R-side. inconclusive on recent prior exam Reason for Exam: This is the second xray taken of this patient in 1 hour. The patient would not cooperate on the previous xray and she did not cooperate on this xray. The patient talked and moved during the whole exam. Acuity: Unknown Type of Exam: Unknown FINDINGS: AP portable view of the chest time stamped at 951 hours demonstrates overlying cardiac monitoring electrodes. Heart size is stable. Bipolar pacemaker enters from the left with intact leads in appropriate positions. There is no change in bibasilar opacities and small bilateral effusions. No sizable extrapleural air. Small linear area of hypodensity is present peripherally in the right upper hemithorax which may be related to trace extrapleural air. No sizable pneumothorax. Linear lucency laterally in the right upper hemithorax suspicious for trace extrapleural air. No change in bibasilar opacities.      Xr Chest Portable    Result Date: 12/8/2020 EXAMINATION: ONE XRAY VIEW OF THE CHEST 12/8/2020 8:27 am COMPARISON: Chest radiograph performed 12/07/2020. HISTORY: ORDERING SYSTEM PROVIDED HISTORY: Pneumonia TECHNOLOGIST PROVIDED HISTORY: Pneumonia Reason for Exam: Pt would not cooperate with tech and she would not hold still. Acuity: Chronic Type of Exam: Ongoing FINDINGS: There are bilateral effusions with adjacent bibasilar consolidation. There is a vertical line running parallel to the lateral pleural surface of the right lung. Lung markings appear to extend beyond this towards the pleural surface and it is difficult to discern whether this represents a pneumothorax versus skin fold. The mediastinal structures are stable with stable cardiac leads presumably in the right atrium and right ventricle. The upper abdomen is unremarkable. The extrathoracic soft tissues are unremarkable. Bilateral effusions with adjacent bibasilar consolidation representing atelectasis versus pneumonia. Potential right-sided pneumothorax and short-term follow-up chest radiograph is recommended. Findings were discussed with Dr. Jessica Aguirre at 9:05 am on 12/8/2020. Xr Chest Portable    Result Date: 12/7/2020  EXAMINATION: ONE XRAY VIEW OF THE CHEST 12/7/2020 12:04 pm COMPARISON: 12/03/2020 HISTORY: ORDERING SYSTEM PROVIDED HISTORY: worsening sob TECHNOLOGIST PROVIDED HISTORY: worsening sob Reason for Exam: PT CO cough with SOB X several days. Best images per pt condition and cooperation. Acuity: Chronic Type of Exam: Ongoing FINDINGS: Transvenous pacer remains in place. Increased pulmonary opacity right base. Increased left pleural effusion. Left basilar opacity. No pneumothorax.      Increased bibasilar opacities could represent atelectasis or pneumonia Increased left pleural effusion     Xr Chest Portable    Result Date: 12/3/2020 EXAMINATION: ONE XRAY VIEW OF THE CHEST 12/3/2020 2:20 am COMPARISON: May 29, 2019. HISTORY: ORDERING SYSTEM PROVIDED HISTORY: weakness TECHNOLOGIST PROVIDED HISTORY: weakness Reason for Exam: Pt c/o weakness, low blood pressure. Acuity: Acute Type of Exam: Initial Additional signs and symptoms: Pt c/o weakness, low blood pressure. FINDINGS: Frontal portable view of the chest.  Low left lung volume. Hyperexpanded right lung volume. Leftward shift of the mediastinal structures. Left greater than right basilar heterogeneous opacities. Small bilateral pleural effusions versus pleural thickening. No pneumothorax. Atherosclerotic thoracic aorta. No significant cardiomegaly. Left pectoral trans venous dual-chamber cardiac pacer device. Osteopenia. Multilevel degenerative disc disease. Left greater than right basilar heterogeneous opacities. Differential considerations include atelectasis/scarring, asymmetric pulmonary edema and an infectious/inflammatory process. Follow-up PA and lateral chest radiographs or chest CT may be helpful for further evaluation as warranted. Small bilateral pleural effusions versus pleural thickening. Low left lung volume with leftward shift of the mediastinal structures. Hyperexpanded right lung volume.      Fl Modified Barium Swallow W Video    Result Date: 12/7/2020 EXAMINATION: MODIFIED BARIUM SWALLOW WAS PERFORMED IN CONJUNCTION WITH [facility] TECHNIQUE: Fluoroscopic evaluation of the swallowing mechanism was performed with multiple consistency of barium product. FLUOROSCOPY DOSE AND TYPE OR TIME AND EXPOSURES: Fluoro time 2:08 min DAP 86.2 dGy cm2 AIR KERMA 16.6 mGy COMPARISON: None HISTORY: ORDERING SYSTEM PROVIDED HISTORY: r/o aspiration TECHNOLOGIST PROVIDED HISTORY: r/o aspiration Reason for Exam: r/o aspiration Acuity: Acute Type of Exam: Initial FINDINGS: Oral phase of swallowing was grossly within normal limits. Deep penetration noted with thin liquid consistency. Premature vallecular spillage with all consistencies. Prolonged mastication of regular solid consistency due to sparse dentition. No evidence of aspiration. Deep penetration with thin liquid consistency. Please see separate speech pathology report for full discussion of findings and recommendations. Physical Examination:        Physical Exam  Constitutional:       General: She is in acute distress. Appearance: She is obese. She is ill-appearing. HENT:      Head: Normocephalic and atraumatic. Eyes:      Pupils: Pupils are equal, round, and reactive to light. Cardiovascular:      Rate and Rhythm: Tachycardia present. Comments: Heart sounds diminished, pulses difficult to palpate bilaterally. Pulmonary:      Comments: Patient is intubated with equal chest rise bilaterally; breath sounds are faint but present. Abdominal:      General: There is no distension. Palpations: Abdomen is soft. Musculoskeletal:         General: Swelling present. Right lower leg: Edema present. Left lower leg: Edema present.       Comments: Significant left-sided upper extremity edema present           Assessment:        Primary Problem  Hospital-acquired pneumonia    Active Hospital Problems    Diagnosis Date Noted    Metabolic acidosis hemodynamic  [E87.2] 12/14/2020  Acute respiratory failure with hypoxia and hypercapnia (HCC) [J96.01, J96.02] 12/14/2020    Acute adrenal insufficiency (HCC) on iv steroid  [E27.40] 12/14/2020    SURINDER (acute kidney injury) ([facility] [N17.9] 12/12/2020    Pacemaker [Z95.0] 12/05/2020    Bacteremia due to Staphylococcus [R78.81, B95.8]     Line sepsis (HCC) [T85.79XA, A41.9] 12/04/2020    Hospital-acquired pneumonia [J18.9, Y95] 12/03/2020    Septic shock (HCC) [A41.9, R65.21] 12/03/2020    Chronic obstructive pulmonary disease, unspecified ([facility] [J44.9] 05/24/2019    Acute respiratory failure (HCC) [J96.00] 05/18/2019    Anxiety disorder [F41.9]     Centrilobular emphysema ([facility] [J43.2] 04/30/2019    Urinary retention [R33.9]     MRSA colonization [Z22.322]     CVA (cerebral vascular accident) ([facility] [I63.9]        Plan:        Sepsis with acute hypoxic respiratory failure secondary to hospital-acquired pneumonia  -WBC 44.2, increased from 42.9 yesterday  -Lactic acid 8.7 up from 4.3 yesterday  -ABG status post intubation: pH 7.15, PCO2 46, PO2 52.5, HCO3 15.8  -Chest x-ray today: Scattered bilateral pulmonary opacities with bilateral effusions  -CT chest without contrast 10/6/2020: Large cavitary right lower lobe mass  -Blood cultures x2 drawn no growth 1 day  -Phenylephrine and vasopressin to maintain blood pressure, possible addition of norepinephrine  -Sodium bicarb 100 mL/h continuous  -Cefazolin 2 g IV every 8 hours discontinued  -Doxycycline 100 mg p.o. every 12 hours discontinued  -Cefepime 2 g IV every 12 hours  -Vancomycin pharmacy to dose  -Metronidazole 500 mg IV every 8 hours  -Infectious disease consulted, following recommendations     Acute on chronic diastolic congestive heart failure with EF of greater than 55% status post pacemaker  -Most recent echo 12/04/2020 displaying EF of greater than 55%  -proBNP 980 on admission, 19,342 on 12/8/2020  -Troponin 48 and 44 on repeat, was 27 and 26 on admission -Magnesium 2.3 on 12/12/2020  -Phosphorus 1.3, 15 mmol repletion IV with recheck in the a.m.  -Stat EKG 12/13/2020 + for sinus tachycardia  -[employer] pacemaker to be evaluated today  -Cardiology consulted, following recommendations  -Lasix and Lopressor held due to hypotension     Acute kidney injury  -Creatinine 0.94 today, down from 1.02 yesterday elevated from less than 0.4 on admission  -Lasix held  -Continue sodium bicarb 100 mL/h  -Vancomycin started yesterday, monitor for worsening SURINDER    Esther Segal MD  12/14/2020  12:26 PM     Attestation and add on       I have discussed the care of Jason Chery , including pertinent history and exam findings,    12/14/20   with the resident. I have seen and examined the patient and the key elements of all parts of the encounter have been performed by me . I agree with the assessment, plan and orders as documented by the resident. Principal Problem:    Hospital-acquired pneumonia  Active Problems:    CVA (cerebral vascular accident) ([address]    Septic shock ([address]    MRSA colonization    Urinary retention    Centrilobular emphysema (HCC)    Anxiety disorder    Acute respiratory failure (HCC)    Chronic obstructive pulmonary disease, unspecified (HCC)    Line sepsis (HCC)    Pacemaker    Bacteremia due to Staphylococcus    SURINDER (acute kidney injury) ([address]    Metabolic acidosis hemodynamic     Acute respiratory failure with hypoxia and hypercapnia (HCC)    Acute adrenal insufficiency (HCC) on iv steroid   Resolved Problems:    * No resolved hospital problems.  *        --   CLINICAL COURSE ---          clinical course has worsened as indicated by transferred to icu   Respiratory distress   Hemodynamic instability   ,    -         Condition    [x] ill ,     [x] high risk , [x] critical ,        [] improved but still labile                                        [] delirium ,      [x] --septic ---,                 [x] I--shock--  [Held by provider] metoprolol tartrate  50 mg Oral BID    budesonide-formoterol  2 puff Inhalation BID    divalproex  125 mg Oral BID    sertraline  50 mg Oral Daily    mirtazapine  7.5 mg Oral Nightly    levothyroxine  125 mcg Oral Daily    baclofen  10 mg Oral BID    sodium chloride flush  10 mL Intravenous 2 times per day    [Held by provider] enoxaparin  40 mg Subcutaneous Daily     Continuous Infusions:    norepinephrine      sodium bicarbonate infusion 100 mL/hr at 12/14/20 0959    vasopressin (Septic Shock) infusion 0.02 Units/min (12/14/20 1020)    fentaNYL Stopped (12/14/20 0748)    dexmedetomidine Stopped (12/14/20 0742)    phenylephrine (JAIR-SYNEPHRINE) 50mg/250mL infusion 200 mcg/min (12/14/20 1218)     PRN Meds: morphine, sodium chloride flush, promethazine (PHENERGAN) in sodium chloride 0.9% IVPB, HYDROcodone 5 mg - acetaminophen, perflutren lipid microspheres, sodium chloride flush, ipratropium-albuterol, sodium chloride flush, potassium chloride **OR** potassium alternative oral replacement **OR** potassium chloride, magnesium sulfate, acetaminophen **OR** acetaminophen, polyethylene glycol, promethazine **OR** ondansetron      --  Clinical time spent on evaluation and caring for patient in critical care unit   32 minutes . -   Patient is ill and symptomatic .   ----    Condition    [x] ill ,     [x] high risk , [x] critical ,           [x] Iseptic---  [] delirium     [x] -end organ failure ----,        [] debility---           [x] I-multiorgan insufficiency---        - ; prognosis guarded     [facility]  [facility], [address] [place] [address].    Phone [phone]   Fax: [phone]  Answering Service: [phone]

## 2020-12-14 NOTE — PROGRESS NOTES
Physical Therapy  DATE: 2020    NAME: Odette Bhandari  MRN: [id number]   : 1950    Patient not seen this date for Physical Therapy due to:  [] Blood transfusion in progress  [] Hemodialysis  [] Patient Declined  [] Spine Precautions   [] Strict Bedrest  [] Surgery/ Procedure  [] Testing      [x] Other Cx, pt is vented/sedated and not medically stable at this time per RN Pepper Garcia, will continue to follow for when patient is medically stable. [] PT is being discontinued at this time. Patient independent. No further needs. [] PT is being discontinued at this time due to declining physical/ medical status. Therapy is not appropriate at this time.     Adriel Avila, PTA

## 2020-12-15 PROBLEM — I50.9 MULTIPLE ORGAN FAILURE WITH HEART FAILURE (HCC): Status: ACTIVE | Noted: 2020-01-01

## 2020-12-15 NOTE — PROGRESS NOTES
Physical Therapy  DATE: 12/15/2020    NAME: Yelena West  MRN: [id number]   : 1950    Patient not seen this date for Physical Therapy due to:  [] Blood transfusion in progress  [] Hemodialysis  [] Patient Declined  [] Spine Precautions   [] Strict Bedrest  [] Surgery/ Procedure  [] Testing      [x] Other 12/15/20: Low hemoglobin 6.5. Discussed with VICKY Kasper - nusrat tx per RN today 850       [] PT is being discontinued at this time. Patient independent. No further needs. [] PT is being discontinued at this time due to declining physical/ medical status. Therapy is not appropriate at this time.     Arsalan Kuhn, PT

## 2020-12-15 NOTE — FLOWSHEET NOTE
12/07/20 1213   Encounter Summary   Services provided to: Patient   Referral/Consult From: [facility] Children;Friends/neighbors; Family members   Continue Visiting   (12/7/20)   Complexity of Encounter Moderate   Length of Encounter 15 minutes   Spiritual Assessment Completed Yes   Routine   Intervention [place]   Spiritual/Christianity   Type Spiritual support   Assessment Coping; Hopeful; Anxious   Intervention Sustaining presence/ Ministry of presence; Active listening;Explored feelings, thoughts, concerns;Nurtured hope;Prayer   Outcome Comfort;Expressed gratitude;Engaged in conversation;Coping
12/11/20 2200   Vital Signs   Temp 99.1 °F (37.3 °C)  ([place] received)   Temp Source Oral   Pulse 145   Heart Rate Source Monitor   Resp 20   /82   Patient Position Supine   Level of Consciousness Alert (0)   MEWS Score 4   Patient Currently in Pain Yes   Oxygen Therapy   SpO2 91 %   Pulse Oximeter Device Mode Intermittent   Pulse Oximeter Device Location Finger   O2 Device Nasal cannula   O2 Flow Rate (L/min) 4 L/min    Brain NP notified of patient's Tachy, O2 being at [facility]. EKG ordered.  Electronically signed by Joseph Patel RN on 12/11/2020 at 10:24 PM
12/12/20 1225   Provider Notification   Reason for Communication Evaluate   Provider Name Dr Anival Caputo   Provider Notification Physician   Method of Communication Page   Response Waiting for response   Notification Time 1226   Patient urine output 50 mL since 7 am.
12/12/20 1600   Provider Notification   Reason for Communication Evaluate   Provider Name Internal Medicine   Provider Notification Physician   Method of Communication Call   Response See orders   Updated on patient condition.
12/12/20 1724   Vital Signs   Temp 98.8 °F (37.1 °C)   Temp Source Oral   Pulse 122   Heart Rate Source Monitor   Resp 28   BP (!) 70/31   BP Location Right lower arm   Level of Consciousness Alert (0)   MEWS Score 7     Interrnal medicine team en route, Delonte RT present
12/12/20 1800   Provider Notification   Reason for Communication Evaluate   Provider Name Dr Lexine Peabody   Provider Notification Physician   Method of Communication Call   Response See orders   Updated on patient condition.
12/12/20 1804   Provider Notification   Reason for Communication Evaluate   Provider Name Internal Medicine   Provider Notification Physician   Method of Communication Call   Response See orders   Updated on patient condition.
12/12/20 1806   Provider Notification   Reason for Communication Evaluate   Provider Name Dr Zandra Real   Provider Notification Physician   Method of Communication Call   Response Waiting for response   RN calling with update on ABG's
12/13/20 1055   Encounter Summary   Services provided to: Patient   Referral/Consult From: [place]   Complexity of Encounter Low   Length of Encounter 15 minutes   Spiritual/Quaker   Type Spiritual support   Assessment Sleeping   Intervention Prayer
12/15/20 0641   Provider Notification   Reason for Communication Review case;New orders   Provider Name Dr. Hernan Cohen   Provider Notification Physician   Method of Communication Call   Response See orders   Notification Time [phone]     MD notified of morning abgs and hgb 6.5. Orders received to give two amps of bicarb and transfuse 2 units PRBC.
Writer received [facility] referral to speak with patient's family regarding disposition of patient's body; writer spoke with patient's sister-in-law, Ene Barriga, on the telephone, SONIA stated she \"was in charge of decisions for patient and wanted to change mortuary to [employer] because they were cheaper than [address]; writer explained to SONIA that she is not listed as an emergency contact for patient; writer called Alena Vilchis who advised writer to talk with patient's brother, Joesph Ferrara regarding SONIA's request; writer called brother Ed's jamarcus and his wife, Maria Guadalupe/SONIA answered, Pat/SONIA stated patient's brother Liv Rene in a [facility] hospital and not available\"; Pat/SONIA stated to writer that Edna Nick is listed as emergency contact on papers in ICU\"; writer spoke with Kristine Gill RN., Clin Lead in ICU who stated \"they have no papers with that information\" ; writer then spoke with patient's nurse, Dontrell Camacho, who stated that patient's decisions were made by his brother Joesph Ferrara, with the assistance of his wife, Jamie oTscano confirmed that Pat/SONIA was not the decision maker for patient; writer then spoke with Genevieve Santos who was present when patient ; Edgardo Kinsey verified that he attempted to speak with patient's brother at the time of patient's death, but patient's brother was not able to have a conversation due to AMS; Edgardo Kinsey verified that brother's wife/SONIA, Anh Baeza,  was involved with the decisions made earlier in the day regarding disposition of patient's body; writer then updated Alena Vilchis who agreed that Pat/SONIA could change the mortuary picking up patient's body; writer facilitated communication in this regard to Margarito Moreno, Public Safety and completed paperwork to reflect this change; Nancy Villatoro present as witness to conversations with Pat/SONIA; RELEASE OF BODY FORM UPDATED: [facility] [place]  This note was written at [address] [facility].         12/15/20 5983
· Situation: Writer was called to Dai's room; familia Gautam Mann is at bedside & Maryam Hutton is failing    · Background: Writer has been know to patient from multiple admissions although no sign of recognition today. Prayer, pastoral & listening presence with Gautam Vineslesaon who states here sister will also come up today    · Assessment: Writer also assisted to clarifying discussion around current code status and families role in helping to make a decision that the patient would want right now + clarify that the doctors do not make this decision for them      · Recommendation: Spiritual Care team will continue to be available for support. 12/15/20 0919   Encounter Summary   Services provided to: Patient and family together   Referral/Consult From: Nurse;Multi-disciplinary team   Support System Family members   Continue Visiting   (12/15/20)   Complexity of Encounter Moderate   Length of Encounter 30 minutes   Routine   Intervention [place]   Spiritual/Scientology   Type Spiritual support   Assessment Unable to respond; Anticipatory grief; Anxious; Tearful;Concerns with suffering   Intervention Sustaining presence/ Ministry of presence; Active listening;Explored feelings, thoughts, concerns;Prayer;Ritual;Grief care; Discussed afterlife   Outcome Shared reminiscences;Engaged in conversation; Shared life review;Grieving;Receptive; Expressed gratitude;Comfort
Intervention Grief care; End of life care; Discussed death;Discussed afterlife; Discussed relationship with God;Discussed meaning/purpose; Active listening;Explored feelings, thoughts, concerns;Explored coping resources;Nurtured hope;Prayer;Ritual   Outcome Comfort;Grieving;Tearful;Encouraged;Receptive; Expressed gratitude;Expressed feelings of michael, peace, and/or awe;Engaged in conversation; Shared life review

## 2020-12-15 NOTE — PROGRESS NOTES
Patient extubated per physician order. Patient extubated in usual fashion. Patient placed on  10 liters/min via non-rebreather face mask.      REY Cunningham   11:07 AM

## 2020-12-15 NOTE — PROGRESS NOTES
Patient's sister-in-law Samaria Vigil and brother Paddy Richardson phoned for updated. Writer updated family on pt's declining blood pressure and condition. Family decides to continue pt as a full code.

## 2020-12-15 NOTE — PROGRESS NOTES
Pt's sister-in-law [facility] and brother [address] [place] [address] called and updated on pt's condition. All questions answered.

## 2020-12-15 NOTE — PLAN OF CARE
Problem: Falls - Risk of:  Goal: Will remain free from falls  Description: Will remain free from falls  12/15/2020 0315 by Becka Cano RN  Outcome: Ongoing     Problem: Falls - Risk of:  Goal: Absence of physical injury  Description: Absence of physical injury  12/15/2020 0315 by Becka Cano RN  Outcome: Ongoing     Problem: Pain:  Goal: Pain level will decrease  Description: Pain level will decrease  12/15/2020 0315 by Becka Cano RN  Outcome: Ongoing     Problem: Pain:  Goal: Control of acute pain  Description: Control of acute pain  12/15/2020 0315 by Becka Cano RN  Outcome: Ongoing     Problem: Pain:  Goal: Control of chronic pain  Description: Control of chronic pain  12/15/2020 0315 by Becka Cano RN  Outcome: Ongoing     Problem: Skin Integrity:  Goal: Will show no infection signs and symptoms  Description: Will show no infection signs and symptoms  12/15/2020 0315 by Becka Cano RN  Outcome: Ongoing     Problem: Skin Integrity:  Goal: Absence of new skin breakdown  Description: Absence of new skin breakdown  12/15/2020 0315 by Becka Cano RN  Outcome: Ongoing     Problem: Skin Integrity:  Goal: Demonstration of wound healing without infection will improve  Description: Demonstration of wound healing without infection will improve  12/15/2020 0315 by Becka Cano RN  Outcome: Ongoing     Problem: Skin Integrity:  Goal: Complications related to intravenous access or infusion will be avoided or minimized  Description: Complications related to intravenous access or infusion will be avoided or minimized  12/15/2020 0315 by Becka Cano RN  Outcome: Ongoing     Problem: Cardiac:  Goal: Ability to maintain vital signs within normal range will improve  Description: Ability to maintain vital signs within normal range will improve  12/15/2020 0315 by Becka Cano RN  Outcome: Ongoing Problem: Cardiac:  Goal: Cardiovascular alteration will improve  Description: Cardiovascular alteration will improve  12/15/2020 0315 by Franklyn Mercer RN  Outcome: Ongoing     Problem: Physical Regulation:  Goal: Ability to maintain vital signs within normal range will improve  Description: Ability to maintain vital signs within normal range will improve  12/15/2020 0315 by Franklyn Mercer RN  Outcome: Ongoing     Problem: Physical Regulation:  Goal: Complications related to the disease process, condition or treatment will be avoided or minimized  Description: Complications related to the disease process, condition or treatment will be avoided or minimized  12/15/2020 0315 by Franklyn Mercer RN  Outcome: Ongoing     Problem: Physical Regulation:  Goal: Diagnostic test results will improve  Description: Diagnostic test results will improve  12/15/2020 0315 by Franklyn Mercer RN  Outcome: Ongoing     Problem: Physical Regulation:  Goal: Will remain free from infection  Description: Will remain free from infection  12/15/2020 0315 by Franklyn Mercer RN  Outcome: Ongoing     Problem: Nutritional:  Goal: Nutritional status will improve  Description: Nutritional status will improve  12/15/2020 0315 by Franklyn Mercer RN  Outcome: Ongoing     Problem: Respiratory:  Goal: Ability to maintain normal respiratory secretions will improve  Description: Ability to maintain normal respiratory secretions will improve  12/15/2020 0315 by Franklyn Mercer RN  Outcome: Ongoing     Problem: HEMODYNAMIC STATUS  Goal: Patient has stable vital signs and fluid balance  12/15/2020 0315 by Franklyn Mercer RN  Outcome: Ongoing  Note: Pt maxed on vasopressin, levophed, and kavon-synephrine. Pt on bicarb gtt at 100 ml/hr. Problem: Nutrition  Goal: Optimal nutrition therapy  12/15/2020 0315 by Franklyn Mercer RN  Outcome: Ongoing  Note: Pt NPO this shift. Problem: OXYGENATION/RESPIRATORY FUNCTION  Goal: Patient will maintain patent airway  12/15/2020 0315 by Alda Morrison RN  Outcome: Ongoing     Problem: OXYGENATION/RESPIRATORY FUNCTION  Goal: Patient will achieve/maintain normal respiratory rate/effort  Description: Respiratory rate and effort will be within normal limits for the patient  12/15/2020 0315 by Alda Morrison RN  Outcome: Ongoing     Problem: MECHANICAL VENTILATION  Goal: Patient will maintain patent airway  12/15/2020 0315 by Alda Morrison RN  Outcome: Ongoing     Problem: MECHANICAL VENTILATION  Goal: ET tube will be managed safely  12/15/2020 0315 by Alda Morrison RN  Outcome: Ongoing     Problem: MECHANICAL VENTILATION  Goal: Oral health is maintained or improved  12/15/2020 0315 by Alda Morrison RN  Outcome: Ongoing

## 2020-12-15 NOTE — PLAN OF CARE
Problem: Respiratory:  Goal: Ability to maintain normal respiratory secretions will improve  Description: Ability to maintain normal respiratory secretions will improve  Outcome: Ongoing     Problem: MECHANICAL VENTILATION  Goal: Patient will maintain patent airway  Outcome: Ongoing     Problem: MECHANICAL VENTILATION  Goal: Oral health is maintained or improved  Outcome: Ongoing     Problem: MECHANICAL VENTILATION  Goal: ET tube will be managed safely  Outcome: Ongoing

## 2020-12-15 NOTE — PROGRESS NOTES
Dr. Melida Cerna on unit and notified of family request to withdraw care and make patient DNR-CC. Dr. Melida Cerna did confirm with family in room of code status change and will enter orders.

## 2020-12-15 NOTE — PROGRESS NOTES
Patient's family including brother (POA) Xiomara Gagnon, sister-in-law Des Osorio, and [place] Adam Salina and Dong Davis requested code status be changed to [facility]. Writer reviewed [facility] code status with family and all are agreeable. Code status change verified by writer and Arely Murphy RN. Resident team notified of change and page out to Dr. Fannie Sidhu.

## 2020-12-15 NOTE — PROGRESS NOTES
Department of internal medicine  Section of nephrology  Daily progress note      REASON FOR CONSULTATION: Management of acute kidney injury    Interval history: Patient remains critically ill and remains intubated and on ventilator support. She is on maximum dose of 3 different pressors with systolic blood pressure in the 40's. She underwent respiratory and hemodynamic decompensation yesterday prompting endotracheal intubation at about 6:30 PM on 2020. She is however nonoliguric. HISTORY OF PRESENT ILLNESS:  Patient is a 66 yo F with significant PMH of COPD, left-sided stroke, hospital acquired pneumonia, pacemaker, sepsis due to UTI, acute cystitis who has been admitted to [address] [place] [address] since 12/3 for management of pneumonia and hypotension. Patient resides at [facility], was hypotensive on admission and was given lovephed for one day. She had a 9 day ICU stay, is currently on Ancef and Monodox for pneumonia. Also had mild bilateral pleural effusions. Patient will be discharged home on Ancef for 5 weeks via PICC line insertion. Patient received vancomycin during initial admission, Cr has been consistently rising for the last few days. Cr is 0.93 today. Patient feels well overall, is fatigued however and falling asleep mid sentence. Complaining of right sided pain. Has a decreased appetite, on Megestrol. MEDICATION PRIOR TO ADMISSION:  Medications Prior to Admission: baclofen (LIORESAL) 10 MG tablet, Take 10 mg by mouth 2 times daily Give 0.5 tablet by mouth two times a day for spasms  [] doxycycline hyclate (VIBRA-TABS) 100 MG tablet, Take 100 mg by mouth 2 times daily  HYDROcodone-acetaminophen (NORCO) 5-325 MG per tablet, Take 1 tablet by mouth every 6 hours as needed for Pain.   dextromethorphan-quiNIDine (NUEDEXTA) 20-10 MG CAPS per capsule, Take 1 capsule by mouth every 12 hours Give 1 capule by mouth every 12 hours for pseudobulbar affect CALCIUM 7.8*  --  7.3* 6.2*   PROT  --  5.1*  --   --    LABALBU  --  2.1*  --   --    BILITOT  --  0.18*  --   --    ALKPHOS  --  83  --   --    AST  --  23  --   --    ALT  --  <5*  --   --       Thyroid functions:   Lab Results   Component Value Date    TSH 0.93 09/16/2020      HgBA1c:    Lab Results   Component Value Date    LABA1C 5.3 04/12/2019     S. Calcium:  Recent Labs     12/15/20  0533   CALCIUM 6.2*     S. Magnesium:  Recent Labs     12/12/20  1816   MG 2.3     S. Phosphorus:  Recent Labs     12/15/20  0533   PHOS 5.5*     FASTING LIPID PANEL:  Lab Results   Component Value Date    CHOL 128 03/16/2020    HDL 30 (L) 03/16/2020    TRIG 99 03/16/2020       AMYLASE/LIPASE/AMMONIA  Recent Labs     12/13/20  1839   LIPASE 11*     Urinalysis:   Lab Results   Component Value Date    NITRU NEGATIVE 12/11/2020    COLORU YELLOW 12/11/2020    PHUR 6.0 12/11/2020    WBCUA 0 TO 2 12/11/2020    RBCUA 0 TO 2 12/11/2020    MUCUS NOT REPORTED 12/11/2020    TRICHOMONAS NOT REPORTED 12/11/2020    YEAST NOT REPORTED 12/11/2020    BACTERIA FEW 12/11/2020    SPECGRAV 1.007 12/11/2020    LEUKOCYTESUR NEGATIVE 12/11/2020    UROBILINOGEN Normal 12/11/2020    BILIRUBINUR NEGATIVE 12/11/2020    BILIRUBINUR NEGATIVE 05/17/2012    GLUCOSEU NEGATIVE 12/11/2020    GLUCOSEU NEGATIVE 05/17/2012    KETUA NEGATIVE 12/11/2020    AMORPHOUS NOT REPORTED 12/11/2020     PHYSICAL EXAM:  BP (!) 48/22   Pulse 100   Temp 95.3 °F (35.2 °C) (Temporal)   Resp 25   Ht 5' (1.524 m)   Wt 148 lb 9.6 oz (67.4 kg)   SpO2 94%   BMI 29.02 kg/m²      · General appearance: Patient intubated and on ventilator support.   · Lungs: Diminished breath sounds on BiPAP  · Heart: Tachycardic regular rhythm  · Abdomen: soft, non-tender; bowel sounds normal; no masses,  no organomegaly  · Extremities: extremities normal, atraumatic, no cyanosis + edema  · Neurological: Lethargic responding and moving extremity  · Eye no icterus no redness    Assessment/plan: 1.  Acute kidney injury - Oliguric and consistent with ischemic acute tubular necrosis secondary to hypotension with component of vancomycin nephrotoxicity. Recommend: Maintain mean arterial pressure greater than 65 mmHg. Avoid nephrotoxic agents. Monitor urine output closely. Titrate pressors to maintain MAP. Basic metabolic profile daily. 2.  Hyponatremia - acute decline of serum sodium from 130 to 121 mmol/L from night. He is on isotonic bicarbonate drip at 100 mL. Check serum and urine osmolality. Repeat serum sodium. Will consider hypertonic saline. 3.  Hypotension - likely secondary to sepsis. Continue IV cefepime and metronidazole and monitor vancomycin level closely. 4.  S/p left-sided cerebrovascular accident. 5.  Hospital acquired pneumonia - continue antibiotics. 6.  Lactic acidosis secondary to sepsis. IV sodium bicarbonate 100 mEq  Continue bicarbonate drip at 100 mL/h. Optimize tissue perfusion. Prognosis is poor.     Stephy Garrido MD FACP  Attending nephrologist

## 2020-12-15 NOTE — PROGRESS NOTES
PULMONARY PROGRESS NOTE:    REASON FOR VISIT: pneumonia  Interval History:      Sedated, on vent    Events since last visit: intubated overnight, requiring more pressors, severe metabolic acidosis    PAST MEDICAL HISTORY:      Scheduled Meds:   sodium chloride  20 mL Intravenous Once    metroNIDAZOLE  500 mg Intravenous Q8H    albumin human  25 g Intravenous Q12H    cefepime  2 g Intravenous Q12H    vancomycin  1,000 mg Intravenous Q24H    vancomycin (VANCOCIN) intermittent dosing (placeholder)   Other RX Placeholder    hydrocortisone sodium succinate PF  100 mg Intravenous Q8H    chlorhexidine  15 mL Mouth/Throat BID    sodium chloride  500 mL Intravenous Once    pantoprazole  40 mg Intravenous Daily    And    sodium chloride (PF)  10 mL Intravenous Daily    sodium chloride flush  10 mL Intravenous 2 times per day    potassium chloride  40 mEq Oral BID WC    ferrous sulfate  325 mg Oral BID WC    [Held by provider] furosemide  20 mg Intravenous Daily    traZODone  50 mg Oral Nightly    [Held by provider] metoprolol tartrate  50 mg Oral BID    budesonide-formoterol  2 puff Inhalation BID    divalproex  125 mg Oral BID    sertraline  50 mg Oral Daily    mirtazapine  7.5 mg Oral Nightly    levothyroxine  125 mcg Oral Daily    baclofen  10 mg Oral BID    sodium chloride flush  10 mL Intravenous 2 times per day    [Held by provider] enoxaparin  40 mg Subcutaneous Daily     Continuous Infusions:   sodium bicarbonate infusion 100 mL/hr at 12/15/20 0918    norepinephrine (LEVOPHED) infusion 30 mcg/min (12/15/20 0830)    vasopressin (Septic Shock) infusion 0.04 Units/min (12/15/20 0921)    fentaNYL Stopped (12/14/20 0748)    dexmedetomidine Stopped (12/14/20 2002)    phenylephrine (JAIR-SYNEPHRINE) 50mg/250mL infusion 200 mcg/min (12/15/20 0921) PRN Meds:morphine, sodium chloride flush, promethazine (PHENERGAN) in sodium chloride 0.9% IVPB, HYDROcodone 5 mg - acetaminophen, perflutren lipid microspheres, sodium chloride flush, ipratropium-albuterol, sodium chloride flush, potassium chloride **OR** potassium alternative oral replacement **OR** potassium chloride, magnesium sulfate, acetaminophen **OR** acetaminophen, polyethylene glycol, promethazine **OR** ondansetron        PHYSICAL EXAMINATION:  BP (!) 50/28   Pulse 96   Temp 95.6 °F (35.3 °C) (Temporal)   Resp 25   Ht 5' (1.524 m)   Wt 148 lb 9.6 oz (67.4 kg)   SpO2 94%   BMI 29.02 kg/m²   afebrile  General : sedated, orally intubated neosynephrine, vasopressin, levophed  Neck  supple, no lymphadenopathy, JVD not raised  Heart  regular rhythm, S1 and S2 normal; no additional sounds heard  Lungs  Air Entry- fair bilaterally; breath sounds : vesicular,   Abdomen  soft, no tenderness  Upper Extremities  - no cyanosis, mottling, LUE lymphedema and contracted  Lower Extremities: no cyanosis, mottling; + edema / tight shiny skin    Current Laboratory, Radiologic, Microbiologic, and Diagnostic studies reviewed  Data ReviewCBC:   Recent Labs     12/13/20 0423 12/13/20 0423 12/14/20 0401 12/14/20  1330 12/14/20  1956 12/15/20  0533   WBC 42.9*  --  44.2*  --   --  27.1*   RBC 3.16*  --  3.08*  --   --  2.27*   HGB 9.1*   < > 8.8* 8.1* 7.5* 6.5*   HCT 28.4*   < > 27.8* 25.8* 23.5* 22.2*     --  390  --   --  243    < > = values in this interval not displayed.      BMP:   Recent Labs     12/13/20 0423 12/14/20  0401 12/15/20  0533   GLUCOSE 110* 181* 195*    130* 121*   K 4.7 4.0 3.8   BUN 15 16 17   CREATININE 1.02* 0.94* 0.98*   CALCIUM 7.8* 7.3* 6.2*     ABGs:   Recent Labs     12/13/20  1925 12/14/20  0438 12/15/20  0520   PHART 7.146* 7.391 6.971*   PO2ART 52.5* 236.0* 93.2   YFM3DTV 45.8* 30.2* 44.9   MUK9IKW 15.8* 18.3* 10.4*   V8TEMKOF 77.4* 98.1* 92.1* PT/INR:  No results found for: PTINR    ASSESSMENT / PLAN:    Pneumonia - ABx  F/u CT chest 4-6 weeks; may need needle biopsy   sinus tachycardia/ anemia/ hypotension- GI consult/ monitor Hgb  Leucocytosis etio unclear- Dr Alaina Dye to address antibiotics - ?  Sepsis  Shock - fluids, pressors, hydrocortisone  ABx per ID    Family decided [facility] and withdrawal of care  Extubate  Stop all meds  Morphine/ ativan every 15 minutes prn    Electronically signed by Ottoniel Meyer on 12/15/20 at 10:19 AM.

## 2020-12-15 NOTE — PROGRESS NOTES
[address] [employer]    PROGRESS NOTE             12/15/2020    8:19 AM    Name:   Elaine Hamilton  MRN:     [id number]     Acct:      [de-identified]   Room:   2008/2008-01  IP Day:  12  Admit Date:  12/3/2020  1:20 AM    PCP:  Virgie Swanson MD  Code Status:  Full Code    Subjective:     C/C:   Chief Complaint   Patient presents with    Fatigue     Interval History Status: worsened. Patient was seen and evaluated by bedside. Despite being on 3 pressors, norepinephrine, phenylephrine, vasopressin, patient is still hypotensive. PEEP decreased to 10 from 14 yesterday, FiO2 set at 100 overnight. Per RN report, blood was seen around oral cavity and was suctioned out. Hemoglobin dropped to 6.5 from 7.5 yesterday 2 units of packed red blood cell order and will transfuse today. Given 2 amps of Bicarb. Will follow with hemoglobin recheck later today. Did not start tube feeding. Pacemaker interrogated yesterday, no problems found. Most recent echo shows LVEF <20%, significantly decreased from echo on 12/4/2020. On IV sodium bicarb and dextrose 5% and IV vancomycin, metronidazole, cefepime. Continue to provide hemodynamic support and treatment for sepsis, prognosis guarded. Family members, sister-in-law and niece, were contacted and CODE STATUS was discussed. Current CODE STATUS is full code. They will discuss with other the family members and will make a decision on what is best for Morelia Beck.     Brief History: 75-year-old female presenting from extended care facility with hypotension.  Initially treated for staph epidermidis sepsis secondary to indwelling subcutaneous catheters in abdomen. Right-sided pulmonary cavitary lesion found on CT. Patient developed signs and symptoms of sepsis during her stay and required transportation to the ICU for treatment of hospital-acquired pneumonia. Patient continues to decompensate despite full support efforts. Review of Systems:     Review of Systems   Unable to perform ROS: Intubated       Medications: Allergies:     Allergies   Allergen Reactions    Penicillins Shortness Of Breath and Rash    Amoxicillin        Current Meds:   Scheduled Meds:    sodium chloride  20 mL Intravenous Once    metroNIDAZOLE  500 mg Intravenous Q8H    albumin human  25 g Intravenous Q12H    cefepime  2 g Intravenous Q12H    vancomycin  1,000 mg Intravenous Q24H    vancomycin (VANCOCIN) intermittent dosing (placeholder)   Other RX Placeholder    hydrocortisone sodium succinate PF  100 mg Intravenous Q8H    chlorhexidine  15 mL Mouth/Throat BID    sodium chloride  500 mL Intravenous Once    pantoprazole  40 mg Intravenous Daily    And    sodium chloride (PF)  10 mL Intravenous Daily    sodium chloride flush  10 mL Intravenous 2 times per day    potassium chloride  40 mEq Oral BID WC    ferrous sulfate  325 mg Oral BID WC    [Held by provider] furosemide  20 mg Intravenous Daily    traZODone  50 mg Oral Nightly    [Held by provider] metoprolol tartrate  50 mg Oral BID    budesonide-formoterol  2 puff Inhalation BID    divalproex  125 mg Oral BID    sertraline  50 mg Oral Daily    mirtazapine  7.5 mg Oral Nightly    levothyroxine  125 mcg Oral Daily    baclofen  10 mg Oral BID    sodium chloride flush  10 mL Intravenous 2 times per day    [Held by provider] enoxaparin  40 mg Subcutaneous Daily     Continuous Infusions:  sodium bicarbonate infusion 100 mL/hr at 20    norepinephrine (LEVOPHED) infusion 30 mcg/min (20)    vasopressin (Septic Shock) infusion 0.04 Units/min (12/15/20 0302)    fentaNYL Stopped (20)    dexmedetomidine Stopped (20)    phenylephrine (JAIR-SYNEPHRINE) 50mg/250mL infusion 200 mcg/min (12/15/20 05)     PRN Meds: morphine, sodium chloride flush, promethazine (PHENERGAN) in sodium chloride 0.9% IVPB, HYDROcodone 5 mg - acetaminophen, perflutren lipid microspheres, sodium chloride flush, ipratropium-albuterol, sodium chloride flush, potassium chloride **OR** potassium alternative oral replacement **OR** potassium chloride, magnesium sulfate, acetaminophen **OR** acetaminophen, polyethylene glycol, promethazine **OR** ondansetron    Data:     Past Medical History:   has a past medical history of Abnormal computed tomography of cervical spine, CVA (cerebral vascular accident) ([facility] [address] GERD (gastroesophageal reflux disease), Hypertension, Paraproteinemia, and Weight loss. Social History:   reports that she has quit smoking. She has a 30.00 pack-year smoking history. She has never used smokeless tobacco. She reports previous alcohol use of about 28.0 standard drinks of alcohol per week. She reports that she does not use drugs. Family History: History reviewed. No pertinent family history. Vitals:  BP (!) 63/39   Pulse 77   Temp 96 °F (35.6 °C) (Temporal)   Resp 26   Ht 5' (1.524 m)   Wt 148 lb 9.6 oz (67.4 kg)   SpO2 94%   BMI 29.02 kg/m²   Temp (24hrs), Av.4 °F (36.3 °C), Min:96 °F (35.6 °C), Max:98.3 °F (36.8 °C)    No results for input(s): POCGLU in the last 72 hours. I/O(24Hr):     Intake/Output Summary (Last 24 hours) at 12/15/2020 0819  Last data filed at 12/15/2020 0532  Gross per 24 hour   Intake 5104.79 ml   Output 300 ml   Net 4804.79 ml       Labs:    CBC with Differential:    Lab Results   Component Value Date WBC 27.1 12/15/2020    RBC 2.27 12/15/2020    RBC 3.66 05/23/2012    HGB 6.5 12/15/2020    HCT 22.2 12/15/2020     12/15/2020     05/23/2012    MCV 98.0 12/15/2020    MCH 28.7 12/15/2020    MCHC 29.3 12/15/2020    RDW 19.0 12/15/2020    METASPCT 2 12/12/2020    LYMPHOPCT 5 12/12/2020    MONOPCT 7 12/12/2020    MYELOPCT 2 12/12/2020    BASOPCT 0 12/12/2020    MONOSABS 2.36 12/12/2020    LYMPHSABS 1.69 12/12/2020    EOSABS 0.00 12/12/2020    BASOSABS 0.00 12/12/2020    DIFFTYPE NOT REPORTED 12/12/2020     BMP:    Lab Results   Component Value Date     12/15/2020    K 3.8 12/15/2020    CL 82 12/15/2020    CO2 12 12/15/2020    BUN 17 12/15/2020    LABALBU 2.1 12/13/2020    LABALBU 4.6 05/17/2012    CREATININE 0.98 12/15/2020    CALCIUM 6.2 12/15/2020    GFRAA >60 12/15/2020    LABGLOM 56 12/15/2020    GLUCOSE 195 12/15/2020    GLUCOSE 87 05/21/2012     Phosphorus:    Lab Results   Component Value Date    PHOS 5.5 12/15/2020     ABG:  No results found for: PH, PCO2, PO2, HCO3, BE, THGB, TCO2, O2SAT    Lab Results   Component Value Date/Time    SPECIAL 7ml right hand 12/12/2020 08:10 PM     Lab Results   Component Value Date/Time    CULTURE NO GROWTH 2 DAYS 12/12/2020 08:10 PM         Radiology:    Echo Complete 2d W Doppler W Color    Result Date: 12/14/2020 Small pericardial effusion. No signs of cardiac tamponade. Signature ----------------------------------------------------------------------------  Electronically signed by Dayan Serna(Sonographer) on 12/14/2020 02:08  PM ---------------------------------------------------------------------------- ----------------------------------------------------------------------------  Electronically signed by Rell Marrero(Interpreting physician) on  12/14/2020 02:27 PM ---------------------------------------------------------------------------- FINDINGS Left Atrium Left atrium is normal in size. Left Ventricle Small LV cavity. Mild left ventricular hypertrophy. Left ventricular systolic function is severely reduced. Calculated EF via Alcazar's method is 20%. Mid and apical segments appear akinetic. Evidence of mild (grade I) diastolic dysfunction. Right Atrium Right atrium is normal in size. Right Ventricle Right ventricle appears mildly enlarged. TAPSE and MPI values suggest normal RV systolic function. Mitral Valve No obvious valvular abnormality seen. No evidence of mitral regurgitation. Aortic Valve No obvious valvular abnormality seen. No evidence of aortic insufficiency or stenosis. Tricuspid Valve No obvious valvular abnormality. Mild tricuspid regurgitation. No pulmonary hypertension. Estimated right ventricular systolic pressure is 55BGGH. Pulmonic Valve Pulmonic valve was not well visualized. No evidence of pulmonic insufficiency or stenosis. Pericardial Effusion Small pericardial effusion. No signs of cardiac tamponade. Pleural Effusion No pleural effusion seen. Miscellaneous Aortic root dimension is at upper limit of normal. E/E' average = 14.9.  M-mode / 2D Measurements & Calculations:   LVIDd:3.28 cm(3.7 - 5.6 cm)      Diastolic WNJHRD:13.0 ml  QWKRN:8.41 cm(2.2 - 4.0 cm)      Systolic UJZPPZ:10.3 ml  CWJV:1.78 cm(0.6 - 1.1 cm)       Aortic Root:3.9 cm(2.0 - 3.7 cm)  LVPWd:1.14 cm(0.6 - 1.1 cm)      LA Dimension: 1.9 cm(1.9 - 4.0 cm)  Fractional Shortenin.51 %    LA volume/Index: 29.2 ml /19m^2  Calculated LVEF (%): 35.86 %     LVOT:2 cm   Mitral:                                Aortic   Peak E-Wave: 1.12 m/s                  Peak Velocity: 1.45 m/s  Peak A-Wave: 0.53 m/s                  Mean Velocity: 0.89 m/s  E/A Ratio: 2.12                        Peak Gradient: 8.41 mmHg  Peak Gradient: 5.02 mmHg               Mean Gradient: 4 mmHg  Deceleration Time: 63 msec                                          Area (continuity): 2.63 cm^2                                         AV VTI: 16.5 cm   Tricuspid:                             Pulmonic:   Estimated RVSP: 33 mmHg  Peak TR Velocity: 2.51 m/s  Peak TR Gradient: 25.2004 mmHg  Estimated RA Pressure: 8 mmHg                                         Estimated PASP: 33.2 mmHg  Septal Wall E' velocity:0.05 m/s Lateral Wall E' velocity:0.14 m/s    Echo Complete 2d W Doppler W Color    Result Date: 2020 pulmonic insufficiency. No obvious valvular vegetation visualized on this study. Trivial pericardial effusion. Signature ----------------------------------------------------------------------------  Electronically signed by Zoila Torres(Sonographer) on 2020 01:01  PM ---------------------------------------------------------------------------- ----------------------------------------------------------------------------  Electronically signed by Hailey Marrero(Interpreting physician) on  2020 02:55 PM ---------------------------------------------------------------------------- FINDINGS Left Atrium Left atrium is normal in size. Left Ventricle Small LV cavity. Normal left ventricular ejection fraction (>55%). Normal wall motions. Mild-moderate left ventricular hypertrophy. Right Atrium Pacing lead seen in the right atrium. Right atrium is normal size . Right Ventricle Pacemaker lead seen in right ventricle. Normal right ventricle size and function. Mitral Valve Normal mitral valve structure and function. Mild mitral regurgitation. Aortic Valve Aortic valve is trileaflet. No aortic insufficiency. No aortic stenosis. Tricuspid Valve Normal tricuspid valve structure and function. Mild tricuspid regurgitation. Estimated right ventricular systolic pressure is 42 mmHg. Mildly elevated right ventricular systolic pressure. Pulmonic Valve Pulmonic valve not well visualized. No pulmonic insufficiency. Pericardial Effusion Trivial pericardial effusion. Miscellaneous Normal aortic root dimension. E/e' average 12.5 IVC normal diameter & inspiratory collapse indicating normal RA filling pressure .  M-mode / 2D Measurements & Calculations:   LVIDd:3.33 cm(3.7 - 5.6 cm)      Diastolic DBXNP.2 ml  YMAZV:6.79 cm(2.2 - 4.0 cm)      Systolic KJFGLA:26.9 ml  WIOO:4.31 cm(0.6 - 1.1 cm)       Aortic Root:3.6 cm(2.0 - 3.7 cm)  LVPWd:1.32 cm(0.6 - 1.1 cm)      LA Dimension: 2.01 cm(1.9 - 4.0 cm)  Fractional Shortenin.34 %    LA volume/Index: 17.2 ml /11m^2  Calculated LVEF (%): 73.35 %     LVOT:2.4 cm   Mitral:                                Aortic   Peak E-Wave: 0.77 m/s                  Peak Velocity: 1.75 m/s  Peak A-Wave: 1.09 m/s                  Mean Velocity: 1.21 m/s  E/A Ratio: 0.7                         Peak Gradient: 12.25 mmHg  Peak Gradient: 2.35 mmHg               Mean Gradient: 7 mmHg  Mean Gradient: 3 mmHg  Deceleration Time: 123 msec                                         Area (continuity): 3.9 cm^2                                         AV VTI: 29.6 cm   Area (continuity): 4.4 cm^2  Mean Velocity: 0.73 m/s   Tricuspid:                             Pulmonic:   Estimated RVSP: 42 mmHg  Peak TR Velocity: 3.12 m/s  Peak TR Gradient: 38.9376 mmHg  Estimated RA Pressure: 3 mmHg                                         Estimated PASP: 41.94 mmHg  Septal Wall E' velocity:0.06 m/s Lateral Wall E' velocity:0.07 m/s    Xr Ankle Right (2 Views)    Result Date: 12/9/2020  EXAMINATION: 3 XRAY VIEWS OF THE RIGHT FOOT; 3 XRAY VIEWS OF THE RIGHT ANKLE 12/9/2020 1:30 pm COMPARISON: None. HISTORY: ORDERING SYSTEM PROVIDED HISTORY: foot pain TECHNOLOGIST PROVIDED HISTORY: foot pain Reason for Exam: foot pain. best images per pt condition Acuity: Acute Type of Exam: Initial; ORDERING SYSTEM PROVIDED HISTORY: foot pain TECHNOLOGIST PROVIDED HISTORY: foot pain Reason for Exam: foot pain. best images per pt condition FINDINGS: Right foot: Mottled osteopenia. Anatomic alignment. No acute fracture. The joint spaces appear normal. Left ankle: Anatomic alignment. No fracture. Mottled osteopenia. 1. No acute bony or joint abnormality 2.  Mottled osteopenia     Xr Foot Right (2 Views)    Result Date: 12/9/2020 abnormality. There are old left-sided rib fractures. Approximately 4.6 x 4.4 x 3.5 cm cavitary right lower lobe mass, similar to the recent abdomen and pelvis CT from 12/03/2020. This is new from a CT dated 07/25/2019. This could represent a cavitary pneumonia however a primary lung neoplasm is not excluded. Recommend short-term follow-up CT after treatment to demonstrate interval change. Rounded opacity in the left lower lobe broadly abutting the pleura could represent rounded atelectasis or infection. Additional airspace opacities in the left lower lobe are suspicious for infection. This area can also be followed up with CT. Small bilateral pleural effusions. 3 mm right upper lobe pulmonary nodule. Follow-up recommendations are below. Suspected enlarged lymph node in the left upper mediastinum posterior to the medial left clavicle, nonspecific. RECOMMENDATIONS: Fleischner Society guidelines for follow-up and management of incidentally detected pulmonary nodules: Nodule size less than 6 mm In a low-risk patient, no routine follow-up. In a high-risk patient, optional CT at 12 months. - Low risk patients include individuals with minimal or absent history of smoking and other known risk factors. - High risk patients include individuals with a history or smoking or known risk factors. Radiology 2017 http://pubs. rsna.org/doi/full/10.1148/radiol. 1467092351     Ct Abdomen Pelvis W Iv Contrast Additional Contrast? None    Result Date: 12/3/2020 EXAMINATION: CT OF THE ABDOMEN AND PELVIS WITH CONTRAST 12/3/2020 2:49 am TECHNIQUE: CT of the abdomen and pelvis was performed with the administration of intravenous contrast. Multiplanar reformatted images are provided for review. Dose modulation, iterative reconstruction, and/or weight based adjustment of the mA/kV was utilized to reduce the radiation dose to as low as reasonably achievable. COMPARISON: August 13, 2019. HISTORY: ORDERING SYSTEM PROVIDED HISTORY: global abdominal pain TECHNOLOGIST PROVIDED HISTORY: global abdominal pain Reason for Exam: Fatigue, abd pain. Due to patient condition, unable to have patient lay flat  or bring arms above head Acuity: Acute Type of Exam: Initial Relevant Medical/Surgical History: History, Gtube/peg tube placement, cholecystectomy FINDINGS: Lower Chest: Partially visualized left lower lobe heterogeneous consolidation. Trace left pleural fluid with pleural thickening. Right lower lobe 4.2 x 3.3 cm masslike opacity with central necrosis, central air-fluid level and mild surrounding heterogeneous/nodular opacities. Emphysema. Partially visualized cardiac pacer leads. Atherosclerosis of the visualized thoracic aorta. Liver: Few scattered subcentimeter hypodensities in the liver are similar to the prior study and likely represent benign cysts. No new suspicious liver mass. Smooth liver contour. Gallbladder and Bile Ducts: Prior cholecystectomy. Spleen: Normal. Adrenal Glands: Normal. Pancreas: Normal. Genitourinary: Normal. Bowel: The stomach is incompletely distended and not well evaluated. Postsurgical changes of the bowel. Normal caliber bowel. Fluid throughout the colon. Vasculature: Atherosclerosis. No abdominal aortic aneurysm. Patent main portal vein. Bones and Soft Tissues: Osteopenia. Degenerative changes in the visualized spine and pelvis. L3 superior endplate 04-60% height loss was not present on the prior study but appears to be chronic. Retroperitoneum/Mesentery: No intraperitoneal free air, ascites or fluid collection. No lymphadenopathy in the abdomen or pelvis. Fluid throughout the colon suggests enteritis. No bowel obstruction. Postsurgical changes of the bowel. Partially visualized left lower lobe heterogeneous consolidation with trace left basilar pleural fluid and pleural thickening. There is also a cavitary 4.3 cm mass in the right lower lobe which demonstrates a small air-fluid level. Findings may relate to infection. Underlying neoplastic process is not excluded. Short-term follow-up chest CT may be helpful for further evaluation.      Kate Guillaume Device Plmt/replace/removal    Result Date: 12/8/2020 PROCEDURE: ULTRASOUND GUIDED VASCULAR ACCESS. FLUOROSCOPY GUIDED PICC PLACEMENT 12/8/2020. HISTORY: ORDERING SYSTEM PROVIDED HISTORY: Long term antibiotic therapy TECHNOLOGIST PROVIDED HISTORY: Long term antibiotic therapy Pneumonia SEDATION: None FLUOROSCOPY DOSE AND TYPE OR TIME AND EXPOSURES: 1 minutes; D  cGy cm2 TECHNIQUE: This procedure was performed by Dr. Earmon Gilford. Informed consent was obtained after a detailed explanation of the procedure including risks, benefits, and alternatives. Universal protocol was observed. The right arm was prepped and draped in sterile fashion using maximum sterile barrier technique. Local anesthesia was achieved with lidocaine. A micropuncture needle was used to access the right brachial vein using ultrasound guidance. An ultrasound image demonstrating patency of the vein with needle tip located within it. An image was obtained and stored in PACs. A 0.018 guidewire was used to place a peel-a-way sheath and a 5 Spanish dual-lumen PICC was advanced with fluoroscopic guidance with the tip at the cavo-atrial junction. The catheter flushed easily and there was a good blood return. The catheter was secured to the skin. The patient tolerated the procedure well and there were no immediate complications. EBL: Less than 3 mL FINDINGS: Fluoroscopic image demonstrates the tip of the catheter at the cavo-atrial junction.      Successful ultrasound and fluoroscopy guided PICC placement     Xr Chest Portable    Result Date: 12/14/2020 EXAMINATION: ONE XRAY VIEW OF THE CHEST 12/14/2020 6:09 am COMPARISON: Chest radiograph performed 12/13/2020. HISTORY: ORDERING SYSTEM PROVIDED HISTORY: ETT placement while on vent TECHNOLOGIST PROVIDED HISTORY: ETT placement while on vent Reason for Exam: SOB Acuity: Chronic Type of Exam: Ongoing FINDINGS: There are scattered bilateral pulmonary opacities. There are bilateral effusions. There is no pneumothorax. The mediastinal structures are stable with stable cardiac leads. The upper abdomen is unremarkable. The extrathoracic soft tissues are unremarkable. There is an endotracheal tube with tip in the midtrachea in stable position. There is a gastric tube and the tip is not visualized. There is a right-sided PICC line with the tip in the proximal SVC. Scattered bilateral pulmonary opacities with bilateral effusions. Support tubes as described above. Xr Chest Portable    Result Date: 12/13/2020  EXAMINATION: ONE XRAY VIEW OF THE CHEST 12/13/2020 7:41 pm COMPARISON: December 13, 2020 HISTORY: ORDERING SYSTEM PROVIDED HISTORY: Intubation TECHNOLOGIST PROVIDED HISTORY: Intubation Reason for Exam: intubation Acuity: Acute Type of Exam: Initial FINDINGS: New ET tube terminates 4 cm above the ron. Enteric tube in the stomach. Bipolar pacer on the left. PICC line on the right terminates in the superior vena cava. Heart and mediastinum normal.  Moderate diffuse interstitial infiltrates unchanged. Bony thorax intact. New ET and enteric tubes as above. Interstitial infiltrates unchanged.      Xr Chest Portable    Result Date: 12/13/2020 EXAMINATION: ONE XRAY VIEW OF THE CHEST 12/13/2020 5:41 pm COMPARISON: December 13, 2020 HISTORY: ORDERING SYSTEM PROVIDED HISTORY: worsening respiratory status TECHNOLOGIST PROVIDED HISTORY: worsening respiratory status Reason for Exam: worsening respiratory status Acuity: Acute Type of Exam: Initial Additional signs and symptoms: worsening respiratory status FINDINGS: Right greater than left airspace disease. Bipolar pacer on the left unchanged. Heart and mediastinum normal.  Small left effusion unchanged. Bony thorax intact. Moderately severe airspace disease increased     Xr Chest Portable    Result Date: 12/13/2020  EXAMINATION: ONE XRAY VIEW OF THE CHEST 12/13/2020 9:17 am COMPARISON: 12/10/2020, 1802 hours HISTORY: ORDERING SYSTEM PROVIDED HISTORY: acute hypoxic respiratory failure, suspicion for hospital aquired pneumonia. TECHNOLOGIST PROVIDED HISTORY: acute hypoxic respiratory failure, suspicion for hospital aquired pneumonia. Reason for Exam: acute hypoxic respiratory failure, suspicion for hospital aquired pneumonia Acuity: Acute Additional signs and symptoms: acute hypoxic respiratory failure, suspicion for hospital aquired pneumonia 78-year-old female with acute hypoxic respiratory failure FINDINGS: Portable AP upright view of the chest. Left-sided subclavian approach cardiac pacemaker device in place. Cardiac monitor leads overlie the chest. Right-sided PICC distal tip overlying the cavoatrial junction. Atherosclerotic calcification of the aortic knob. Stable cardiomegaly. Cardiac and mediastinal contours remain unchanged. Trachea midline. No pneumothorax. No free air. Stable small bilateral pleural effusions and bilateral mid and lower zone predominant airspace disease. Underlying COPD. Mild pulmonary vascular congestion. Visualized osseous structures remain unchanged. 1. Stable small bilateral pleural effusions and bilateral mid/lower zone predominant airspace disease. 2. Underlying COPD. 3. Mild pulmonary vascular congestion. Stable cardiomegaly. 4. Right-sided PICC distal tip overlying the cavoatrial junction. Xr Chest Portable    Result Date: 12/10/2020  EXAMINATION: ONE XRAY VIEW OF THE CHEST 12/10/2020 6:10 pm COMPARISON: December 8, 2020 HISTORY: ORDERING SYSTEM PROVIDED HISTORY: PNA; copious sputum TECHNOLOGIST PROVIDED HISTORY: PNA; copious sputum Reason for Exam: PNA; copious sputum Acuity: Acute Type of Exam: Subsequent/Follow-up Additional signs and symptoms: PNA; copious sputum FINDINGS: Bipolar pacer on the left unchanged. New PICC line on the right terminates 3 cm below the atrial caval junction. Right lower lobe airspace disease. Small bilateral pleural effusions. Heart and mediastinum normal.  Bony thorax intact. New PICC line on the right as above. No change right lower lobe airspace disease. No change bilateral pleural effusions.      Xr Chest Portable    Result Date: 12/8/2020 EXAMINATION: ONE XRAY VIEW OF THE CHEST 12/8/2020 9:30 am COMPARISON: 8 December 2020 at 826 hours HISTORY: ORDERING SYSTEM PROVIDED HISTORY: r/o pneumothorax R-side. inconclusive on recent prior exam TECHNOLOGIST PROVIDED HISTORY: r/o pneumothorax R-side. inconclusive on recent prior exam Reason for Exam: This is the second xray taken of this patient in 1 hour. The patient would not cooperate on the previous xray and she did not cooperate on this xray. The patient talked and moved during the whole exam. Acuity: Unknown Type of Exam: Unknown FINDINGS: AP portable view of the chest time stamped at 951 hours demonstrates overlying cardiac monitoring electrodes. Heart size is stable. Bipolar pacemaker enters from the left with intact leads in appropriate positions. There is no change in bibasilar opacities and small bilateral effusions. No sizable extrapleural air. Small linear area of hypodensity is present peripherally in the right upper hemithorax which may be related to trace extrapleural air. No sizable pneumothorax. Linear lucency laterally in the right upper hemithorax suspicious for trace extrapleural air. No change in bibasilar opacities.      Xr Chest Portable    Result Date: 12/8/2020 EXAMINATION: ONE XRAY VIEW OF THE CHEST 12/8/2020 8:27 am COMPARISON: Chest radiograph performed 12/07/2020. HISTORY: ORDERING SYSTEM PROVIDED HISTORY: Pneumonia TECHNOLOGIST PROVIDED HISTORY: Pneumonia Reason for Exam: Pt would not cooperate with tech and she would not hold still. Acuity: Chronic Type of Exam: Ongoing FINDINGS: There are bilateral effusions with adjacent bibasilar consolidation. There is a vertical line running parallel to the lateral pleural surface of the right lung. Lung markings appear to extend beyond this towards the pleural surface and it is difficult to discern whether this represents a pneumothorax versus skin fold. The mediastinal structures are stable with stable cardiac leads presumably in the right atrium and right ventricle. The upper abdomen is unremarkable. The extrathoracic soft tissues are unremarkable. Bilateral effusions with adjacent bibasilar consolidation representing atelectasis versus pneumonia. Potential right-sided pneumothorax and short-term follow-up chest radiograph is recommended. Findings were discussed with Dr. Tina Molina at 9:05 am on 12/8/2020. Xr Chest Portable    Result Date: 12/7/2020  EXAMINATION: ONE XRAY VIEW OF THE CHEST 12/7/2020 12:04 pm COMPARISON: 12/03/2020 HISTORY: ORDERING SYSTEM PROVIDED HISTORY: worsening sob TECHNOLOGIST PROVIDED HISTORY: worsening sob Reason for Exam: PT CO cough with SOB X several days. Best images per pt condition and cooperation. Acuity: Chronic Type of Exam: Ongoing FINDINGS: Transvenous pacer remains in place. Increased pulmonary opacity right base. Increased left pleural effusion. Left basilar opacity. No pneumothorax.      Increased bibasilar opacities could represent atelectasis or pneumonia Increased left pleural effusion     Xr Chest Portable    Result Date: 12/3/2020 EXAMINATION: ONE XRAY VIEW OF THE CHEST 12/3/2020 5:17 am COMPARISON: Earlier same day. HISTORY: ORDERING SYSTEM PROVIDED HISTORY: post RIJ central line TECHNOLOGIST PROVIDED HISTORY: post RIJ central line Reason for Exam: Right central line placement. Acuity: Acute Type of Exam: Initial Additional signs and symptoms: Right central line placement. FINDINGS: Interval placement of a right IJ central venous catheter with tip in the mid SVC. No pneumothorax. Redemonstration of low left lung volume and hyperexpanded right lung volume. Similar appearing left greater than right basilar airspace disease with left basilar consolidation and right lateral basilar masslike opacity. Small left pleural effusion. Stable left pectoral trans venous dual-chamber cardiac pacer device. Stable cardiomediastinal silhouette and great vessels. Interval placement of a right IJ central venous catheter with tip in the mid SVC. No pneumothorax. Otherwise, no significant interval change with redemonstration of bilateral basilar airspace disease with left basilar consolidation and right lateral basilar masslike opacity. Continued attention on follow-up recommended. Small left pleural effusion.      Xr Chest Portable    Result Date: 12/3/2020 EXAMINATION: ONE XRAY VIEW OF THE CHEST 12/3/2020 2:20 am COMPARISON: May 29, 2019. HISTORY: ORDERING SYSTEM PROVIDED HISTORY: weakness TECHNOLOGIST PROVIDED HISTORY: weakness Reason for Exam: Pt c/o weakness, low blood pressure. Acuity: Acute Type of Exam: Initial Additional signs and symptoms: Pt c/o weakness, low blood pressure. FINDINGS: Frontal portable view of the chest.  Low left lung volume. Hyperexpanded right lung volume. Leftward shift of the mediastinal structures. Left greater than right basilar heterogeneous opacities. Small bilateral pleural effusions versus pleural thickening. No pneumothorax. Atherosclerotic thoracic aorta. No significant cardiomegaly. Left pectoral trans venous dual-chamber cardiac pacer device. Osteopenia. Multilevel degenerative disc disease. Left greater than right basilar heterogeneous opacities. Differential considerations include atelectasis/scarring, asymmetric pulmonary edema and an infectious/inflammatory process. Follow-up PA and lateral chest radiographs or chest CT may be helpful for further evaluation as warranted. Small bilateral pleural effusions versus pleural thickening. Low left lung volume with leftward shift of the mediastinal structures. Hyperexpanded right lung volume.      Fl Modified Barium Swallow W Video    Result Date: 12/7/2020 EXAMINATION: MODIFIED BARIUM SWALLOW WAS PERFORMED IN CONJUNCTION WITH SPEECH PATHOLOGY SERVICES TECHNIQUE: Fluoroscopic evaluation of the swallowing mechanism was performed with multiple consistency of barium product. FLUOROSCOPY DOSE AND TYPE OR TIME AND EXPOSURES: Fluoro time 2:08 min DAP 86.2 dGy cm2 AIR KERMA 16.6 mGy COMPARISON: None HISTORY: ORDERING SYSTEM PROVIDED HISTORY: r/o aspiration TECHNOLOGIST PROVIDED HISTORY: r/o aspiration Reason for Exam: r/o aspiration Acuity: Acute Type of Exam: Initial FINDINGS: Oral phase of swallowing was grossly within normal limits. Deep penetration noted with thin liquid consistency. Premature vallecular spillage with all consistencies. Prolonged mastication of regular solid consistency due to sparse dentition. No evidence of aspiration. Deep penetration with thin liquid consistency. Please see separate speech pathology report for full discussion of findings and recommendations. Physical Examination:        Physical Exam  Constitutional:       General: She is in acute distress. Appearance: She is obese. She is ill-appearing. HENT:      Head: Normocephalic and atraumatic. Mouth/Throat:      Comments: Residue of blood around mouth. Blood was suctioned out of oral cavity. Eyes:      Pupils: Pupils are equal, round, and reactive to light. Cardiovascular:      Rate and Rhythm: Tachycardia present. Comments: Heart sounds diminished but present, pulses difficult to palpate bilaterally. Pulmonary:      Comments: Patient is intubated and on ventilator with equal chest rise bilaterally; breath sounds present. Abdominal:      General: There is no distension. Palpations: Abdomen is soft. Musculoskeletal:         General: Swelling present. Right lower leg: Edema present. Left lower leg: Edema present.       Comments: Bilateral upper extremities edema present         Assessment:        Primary Problem  Hospital-acquired pneumonia Active Hospital Problems    Diagnosis Date Noted    Metabolic acidosis hemodynamic  [E87.2] 12/14/2020    Acute respiratory failure with hypoxia and hypercapnia (HCC) [J96.01, J96.02] 12/14/2020    Acute adrenal insufficiency (HCC) on iv steroid  [E27.40] 12/14/2020    Severe malnutrition ([address] [E43] 12/14/2020    SURINDER (acute kidney injury) ([address] [N17.9] 12/12/2020    Pacemaker [Z95.0] 12/05/2020    Bacteremia due to Staphylococcus [R78.81, B95.8]     Line sepsis ([address] [T85.79XA, A41.9] 12/04/2020    Hospital-acquired pneumonia [J18.9, Y95] 12/03/2020    Septic shock ([address] [A41.9, R65.21] 12/03/2020    Chronic obstructive pulmonary disease, unspecified ([address] [J44.9] 05/24/2019    Acute respiratory failure (HCC) [J96.00] 05/18/2019    Anxiety disorder [F41.9]     Centrilobular emphysema ([address] [J43.2] 04/30/2019    Urinary retention [R33.9]     MRSA colonization [Z22.322]     CVA (cerebral vascular accident) ([address] [I63.9]        Plan:        Sepsis with multiple organ failure secondary to hospital-acquired pneumonia  -Hemoglobin dropped to 6.5 from 7.5 yesterday. 2units of pRBC ordered and will transfuse today. Recheck later today  -GI on board and following. -WBC 27.1, decreased from 44.2 yesterday  -Lactic acid 9.4 on 12/14 up from 8.7  -ABG status post intubation: pH 6.971, PCO2 44.9, PO2 93.2, HCO3 10.4  -Chest x-ray today: Cardiomegaly with bilateral airspace opacities most pronounced in the perihilar regions. Bilateral effusion with left-sided effusion being larger compared to prior.   -CT chest without contrast 10/6/2020: Large cavitary right lower lobe mass  -Urine output 300 mL over 24 hours, significant decrease from 12/12  -Blood cultures x2 drawn no growth 2 day  -Phenylephrine, vasopressin, norepinephrine to maintain blood pressure  -Sodium bicarb 100 mL/h continuous  -Cefazolin 2 g IV every 8 hours discontinued  -Doxycycline 100 mg p.o. every 12 hours discontinued -Cefepime 2 g IV every 12 hours, day 3  -Vancomycin pharmacy to dose, day 2  -Metronidazole 500 mg IV every 8 hours, day 2  -Infectious disease consulted, following recommendations     Acute on chronic diastolic congestive heart failure with EF of greater than 55% status post pacemaker  -Echo on 12/04/2020 displaying EF of greater than 55%  -Echo on 12/14/2020 showed LVEF of 20%, severely reduced from previous echo.  -proBNP 980 on admission, 19,342 on 12/8/2020  -Troponin 48 and 44 on repeat 12/13, was 27 and 26 on admission  -Magnesium 2.3 on 12/12/2020  -Phosphorus 1.3, 15 mmol repletion IV, recheck at 5.5 12/15  -Stat EKG 12/13/2020 + for sinus tachycardia  -Medtronic pacemaker to be evaluated 12/14, shows no problems  -Cardiology consulted, following recommendations  -Lasix and Lopressor held due to hypotension     Acute kidney injury  -Creatinine 0.98 today, up from 0.94 yesterday elevated from less than 0.4 on admission  -Lasix held  -Continue sodium bicarb 100 mL/h  -Vancomycin started yesterday, monitor for worsening SURINDER    Willa Lafleur MD  12/15/2020  8:19 AM     Attestation and add on       I have discussed the care of Maren Gatica , including pertinent history and exam findings,      12/15/20    with the resident. I have seen and examined the patient and the key elements of all parts of the encounter have been performed by me . I agree with the assessment, plan and orders as documented by the resident.      Principal Problem:    Hospital-acquired pneumonia  Active Problems:    CVA (cerebral vascular accident) ([address]    Septic shock ([address]    MRSA colonization    Urinary retention    Centrilobular emphysema (HCC)    Anxiety disorder    Acute respiratory failure (HCC)    Chronic obstructive pulmonary disease, unspecified (HCC)    Line sepsis (HCC)    Pacemaker    Bacteremia due to Staphylococcus    SURINDER (acute kidney injury) ([address]    Metabolic acidosis hemodynamic Acute respiratory failure with hypoxia and hypercapnia (HCC)    Acute adrenal insufficiency (HCC) on iv steroid     Severe malnutrition (HCC)    Multiple organ failure with heart failure (HCC)  Resolved Problems:    * No resolved hospital problems. *            ---- ;        Medications: Allergies: Allergies   Allergen Reactions    Penicillins Shortness Of Breath and Rash    Amoxicillin        Current Meds:   Scheduled Meds:   Continuous Infusions:   PRN Meds:         [address], [address] [place] [address].    Phone [phone]   Fax: [phone]  Answering Service: [phone]

## 2020-12-15 NOTE — PROGRESS NOTES
Wendy Quijano    1950    Called and updated family on Condition     Called both Alexus Shrestha (sister in-law) at [phone] and Shannon Hong (Niece) [phone] directly and spoke with them about Dai's condition and what her wishes would have been. Explained she has remained on max pressure support with 3 medications and is still not maintaining adequate pressure as well as remaining remaining dependent on ventilator. Further with no significant clinical improvement over all. Reiterated the goal for all of us is to do what Lesly Staples would have wanted. Both unsure exactly what she would have wanted and will discuss with other family members. [facility] states Elo Crawley is planning to visit today to get a better understanding of Dai's condition. Will keep full support on at this time.        Electronically signed by Corrine Castellanos MD on 12/15/2020 at 7:59 AM

## 2020-12-15 NOTE — PROGRESS NOTES
Gastroenterology Consult Note      Patient: Janessa Chua  : 1950  Acct#:  [id number]     Date:  2020    Subjective:       History of Present Illness  Events noted  Patient is intubated sedated on the vent  No sign of bleeding according to the nurse  NG suction is clear  No bowel movements  Hemoglobin is stable at 8.8        Past Medical History:   Diagnosis Date    Abnormal computed tomography of cervical spine     sclerotic bone appearance     CVA (cerebral vascular accident) ([address]     left  side weakness    GERD (gastroesophageal reflux disease)     Hypertension     Paraproteinemia     Weight loss       Past Surgical History:   Procedure Laterality Date    ABDOMEN SURGERY N/A 2019    ABDOMEN DEBRIDEMENT WOUND CLOSURE REOPENING OF RECENT LAPOROATOMY, ABDOMINAL WASHOUT, REPAIR FASCIAL DEHISENCE WITH MESH performed by Ramila Faith DO at [facility] 5/15/2019    CHOLECYSTECTOMY performed by Ramila Faith DO at [address] [place] [address] 2019    GASTROSTOMY TUBE PLACEMENT performed by Ramila Faith DO at [address] [place] [address] Drive [address] [place] [address] N/A 2019    EGD ESOPHAGOGASTRODUODENOSCOPY WITH REMOVAL OF PEG TUBE performed by Ramila Faith DO at [address] [place] [address]  2019         LAPAROSCOPY N/A 5/15/2019    LAPAROSCOPY EXPLORATORY CONVERTED TO EXPLORATORY LAPAROTOMY/ RIGHT COLON RESECTION AND ANASTAMOSIS/ OPEN CHOLECYSTECTOMY/ EXTENSIVE LYSIS OF ADHESIONS performed by Rmaila Faith DO at [place] [address]  2011    Pacemaker is Medtronic Revo (compatible). Leads placed in  are NOT MRI compatible. Placed at [place]. V's per Dr. Shannon Thompson can not have an MRI.     UPPER GASTROINTESTINAL ENDOSCOPY N/A 2019    EGD ESOPHAGOGASTRODUODENOSCOPY @ BEDSIDE  ICU  performed by Felipe Wild MD at [address]  Past Endoscopic History as above I could not find the colonoscopy report she is talking about from the EGD as mentioned    Admission Meds  No current facility-administered medications on file prior to encounter. Current Outpatient Medications on File Prior to Encounter   Medication Sig Dispense Refill    baclofen (LIORESAL) 10 MG tablet Take 10 mg by mouth 2 times daily Give 0.5 tablet by mouth two times a day for spasms      HYDROcodone-acetaminophen (NORCO) 5-325 MG per tablet Take 1 tablet by mouth every 6 hours as needed for Pain.  dextromethorphan-quiNIDine (NUEDEXTA) 20-10 MG CAPS per capsule Take 1 capsule by mouth every 12 hours Give 1 capule by mouth every 12 hours for pseudobulbar affect      traZODone (DESYREL) 100 MG tablet Take 100 mg by mouth nightly For insomnia      divalproex (DEPAKOTE) 125 MG DR tablet Take 125 mg by mouth 2 times daily      ipratropium-albuterol (DUONEB) 0.5-2.5 (3) MG/3ML SOLN nebulizer solution Inhale 1 vial into the lungs every 8 hours as needed for Shortness of Breath      furosemide (LASIX) 20 MG tablet Take 20 mg by mouth daily       mirtazapine (REMERON) 7.5 MG tablet Take 7.5 mg by mouth nightly      ondansetron (ZOFRAN) 4 MG tablet Take 4 mg by mouth every 6 hours as needed for Nausea or Vomiting      sertraline (ZOLOFT) 25 MG tablet Take 50 mg by mouth daily      amiodarone (PACERONE) 100 MG tablet Take 1 tablet by mouth 2 times daily 30 tablet 3    metoprolol tartrate (LOPRESSOR) 50 MG tablet Take 1 tablet by mouth 2 times daily 60 tablet 3    levothyroxine (SYNTHROID) 75 MCG tablet Take 1 tablet by mouth Daily (Patient taking differently: Take 125 mcg by mouth Daily ) 30 tablet 3    budesonide-formoterol (SYMBICORT) 160-4.5 MCG/ACT AERO Inhale 2 puffs into the lungs 2 times daily      Misc. Devices [facility]) [address] [employer] [employer] wheelchair with left fupper extremity support  Dx: stroke with left hemiparesis.  1 each 0 Anxiety disorder    Acute respiratory failure (HCC)    Chronic obstructive pulmonary disease, unspecified (HCC)    Line sepsis (HCC)    Pacemaker    Bacteremia due to Staphylococcus    SURINDER (acute kidney injury) ([facility]    Metabolic acidosis hemodynamic     Acute respiratory failure with hypoxia and hypercapnia (HCC)    Acute adrenal insufficiency (HCC) on iv steroid     Severe malnutrition ([facility]  Resolved Problems:    * No resolved hospital problems. *      Severe septic shock  No sign of bleeding with a  Drop in hemoglobin  Hypotension related to sepsis from UTI/pneumonia  History of small bowel obstruction, but negative CAT scan  History of severe esophagitis    Recommendations:   H&H monitoring transfuse as needed  Continue PPI  No issues from the GI standpoint we will sign off  Okay for tube feeding if needed    GI will sign off please contact if needed                        Thank you for allowing me to participate in the care of your patient. Please feel free to contact me with any questions or concerns.      Nancy Hester MD

## 2020-12-15 NOTE — DEATH NOTES
Death Pronouncement Note  Patient's Name: Janessa Chua   Patient's YOB: 1950  MRN Number: [id number]    Admitting Provider: Vikas Sánchez MD  Attending Provider: No att. providers found    Patient was examined and the following were absent: Pulses, Blood Pressure and Respiratory effort    I declared the patient dead on 12/15/2020 at 11:17 AM    Preliminary Cause of Death: Cardiovascular failure due to septic shock, believed pneumonia.       Electronically signed by Rosie Bey MD on 12/15/20 at 12:40 PM EST

## 2020-12-17 NOTE — DISCHARGE SUMMARY
[address]    Discharge Summary     Patient ID: Maren Gatica  :  1950   MRN: [id number]     ACCOUNT:  [de-identified]   Patient's PCP: Yomaira Muñoz MD  Admit Date: 12/3/2020   Discharge Date: 12/15/2020  Length of Stay: 10  Code Status:  Prior  Admitting Physician: Florencio De Leon MD  Discharge Physician: Willa Lafleur MD     Active Discharge Diagnoses:       Primary Problem  Hospital-acquired pneumonia      Hospital Problems  Active Hospital Problems    Diagnosis Date Noted    Multiple organ failure with heart failure ([address] [I50.9]     Metabolic acidosis hemodynamic  [E87.2] 2020    Acute respiratory failure with hypoxia and hypercapnia (HCC) [J96.01, J96.02] 2020    Acute adrenal insufficiency ([address] on iv steroid  [E27.40] 2020    Severe malnutrition ([address] [E43] 2020    SURINDER (acute kidney injury) ([address] [N17.9] 2020    Pacemaker [Z95.0] 2020    Bacteremia due to Staphylococcus [R78.81, B95.8]     Line sepsis ([address] Bethany Baldwin, A41.9] 2020    Hospital-acquired pneumonia [J18.9, Y95] 2020    Septic shock ([address] [A41.9, R65.21] 2020    Chronic obstructive pulmonary disease, unspecified ([address] [J44.9] 2019    Acute respiratory failure ([address] [J96.00] 2019    Anxiety disorder [F41.9]     Centrilobular emphysema ([address] [J43.2] 2019    Urinary retention [R33.9]     MRSA colonization [Z22.322]     CVA (cerebral vascular accident) [facility]) [I63.9]        Admission Condition:  poor     Discharged Condition:     Hospital Stay:       Hospital Course:  Maren Gatica is a 65 y.o. female who was admitted for the management of  Hospital-acquired pneumonia , presented to ER with fatigue and hypotension. 77-year-old female patient came from extended care facility and was admitted for the management of hospital-acquired pneumonia and hypotension. On CT chest, it showed rounded opacity in the left lower lobe and a right-sided pulmonary cavity lesion. Patient was initially placed on IV vancomycin and cefepime, as well as IV normal saline fluid. Blood culture came back positive for staph hominis and staph capitis. MRSA swab also came back positive. Infectious disease and pulmonology were consulted. She was then treated for staph epidermidis sepsis secondary to indwelling subcutaneous catheter. Patient initially showed improvement. Patient developed tenderness in her right malleolus joint, prednisone was given. Her WBC was elevated at 13.7 and continue to trend upward. However, patient soon developed SURINDER with her elevated creatinine of 0.93. Nephrology was consulted. Patient also showed sign of vascular congestion and her Pro BNP was elevated to 19,342. Lasix was placed on board. Fluids were held. Patient soon developed signs and symptoms of sepsis during her stay. Fluid bolus were given. Hemoglobin dropped to 6.8 on 12/12 from 8.2.  1 unit of packed red blood cells given. GI was consulted. A rapid response was called on the patient on 12/12 due to persistent hypotension and low oxygen saturation. She was on 6 L nasal cannula and saturated at 91%. She was then transported to ICU for treatment. Patient developed acute hypoxic respiratory failure and acute on chronic diastolic congestive heart failure. Cardiology was consulted. Patient was placed on IV norepinephrine, phenylephrine, vasopressin but blood pressure was still not well controlled. She continued to experienced hypotensive episodes. Patient was placed on fluids, IV hydrocortisone and given albumin. Patient continued to have leukocytosis, WBC 44.2. Patient was then intubated. Family members were contacted. CODE STATUS discussed.   She was [facility] Transthoracic Echocardiography Report (TTE)  Patient Name 167Janet Ramirez     Date of Study                 12/14/2020               Ai Jo Ann   Date of      1950  Gender                        Female  Birth   Age          65 year(s)  Race                             Room Number  2008        Height:                       59.84 inch, 152 cm   Corporate ID [id number]    Weight:                       128 pounds, 58 kg  #   Patient Acct [de-identified]   BSA:           1.54 m^2       BMI:      25.11  #                                                                kg/m^2   MR #         [id number]      Sonographer                   Dayan Serna   Accession #  [id number]  Interpreting Physician        Cindy Schulte   Fellow                   Referring Nurse Practitioner   Interpreting             Referring Physician           Kaitlin Quiles  Additional Comments Technically difficult study. PT supine, on vent. Type of Study   TTE procedure:2D Echocardiogram, M-Mode, Doppler, Color Doppler. Procedure Date Date: 12/14/2020 Start: 01:20 PM Study Location: [address] [place] [address] Technical Quality: Fair visualization Indications:Tachycardia. Comments:Rule out cardiac tamponade History / Tech. Comments: CVA, HTN, Pacemaker Patient Status: STAT Height: 59.84 inches Weight: 127.88 pounds BSA: 1.54 m^2 BMI: 25.11 kg/m^2 Rhythm: Sinus tachycardia HR: 128 bpm BP: 102/33 mmHg CONCLUSIONS Summary Technically difficult study. Small LV cavity. Mild left ventricular hypertrophy. Left ventricular systolic function is severely reduced. Calculated EF via Alcazar's method is 20%. Mid and apical segments appear akinetic. Evidence of mild (grade I) diastolic dysfunction. Both atria are normal in size. Right ventricle appears mildly enlarged. TAPSE and MPI values suggest normal RV systolic function. Mild tricuspid regurgitation. No pulmonary hypertension. Estimated right ventricular systolic pressure is 96QGVH. Dimension: 1.9 cm(1.9 - 4.0 cm)  Fractional Shortenin.51 %    LA volume/Index: 29.2 ml /19m^2  Calculated LVEF (%): 35.86 %     LVOT:2 cm   Mitral:                                Aortic   Peak E-Wave: 1.12 m/s                  Peak Velocity: 1.45 m/s  Peak A-Wave: 0.53 m/s                  Mean Velocity: 0.89 m/s  E/A Ratio: 2.12                        Peak Gradient: 8.41 mmHg  Peak Gradient: 5.02 mmHg               Mean Gradient: 4 mmHg  Deceleration Time: 63 msec                                          Area (continuity): 2.63 cm^2                                         AV VTI: 16.5 cm   Tricuspid:                             Pulmonic:   Estimated RVSP: 33 mmHg  Peak TR Velocity: 2.51 m/s  Peak TR Gradient: 25.2004 mmHg  Estimated RA Pressure: 8 mmHg                                         Estimated PASP: 33.2 mmHg  Septal Wall E' velocity:0.05 m/s Lateral Wall E' velocity:0.14 m/s    Echo Complete 2d W Doppler W Color    Result Date: 2020 [facility] Transthoracic Echocardiography Report (TTE)  Patient Name 167Janet Ramirez     Date of Study                 12/04/2020               Elana    Date of      1950  Gender                        Female  Birth   Age          65 year(s)  Race                             Room Number  2098        Height:                       59.84 inch, 152 cm   Corporate ID [id number]    Weight:                       128 pounds, 58 kg  #   Patient Acct [de-identified]   BSA:           1.54 m^2       BMI:      25.11  #                                                                kg/m^2   MR #         [id number]      Sonographer                   Zoila Torres   Accession #  [id number]  Interpreting Physician        German Meneses   Fellow                   Referring Nurse Practitioner   Interpreting             Referring Physician           Festus Haq *  Fellow  Type of Study   TTE procedure:2D Echocardiogram, M-Mode, Doppler, Color Doppler. Procedure Date Date: 12/04/2020 Start: 11:01 AM Study Location: [address] [place] [address] Technical Quality: Fair visualization Indications:Positive blood cultures. History / Tech. Comments: COPD CVA PACER Patient Status: Inpatient Height: 59.84 inches Weight: 127.88 pounds BSA: 1.54 m^2 BMI: 25.11 kg/m^2 Rhythm: Within normal limits HR: 85 bpm BP: 143/58 mmHg CONCLUSIONS Summary Small LV cavity. Normal left ventricular ejection fraction (>55%). Normal wall motions. Mild-moderate left ventricular hypertrophy. Normal right ventricle size and function. Pacemaker lead seen in right heart chambers. No obvious vegetation seen. Aortic valve is trileaflet. No aortic insufficiency. Normal mitral valve structure and function. Mild mitral regurgitation. Normal tricuspid valve structure and function. Mild tricuspid regurgitation. Estimated right ventricular systolic pressure is 42 mmHg. Mildly elevated right ventricular systolic pressure. Pulmonic valve not well visualized.  No pulmonic insufficiency. No obvious valvular vegetation visualized on this study. Trivial pericardial effusion. Signature ----------------------------------------------------------------------------  Electronically signed by Zoila Torres(Sonographer) on 2020 01:01  PM ---------------------------------------------------------------------------- ----------------------------------------------------------------------------  Electronically signed by Krysta Marrero(Interpreting physician) on  2020 02:55 PM ---------------------------------------------------------------------------- FINDINGS Left Atrium Left atrium is normal in size. Left Ventricle Small LV cavity. Normal left ventricular ejection fraction (>55%). Normal wall motions. Mild-moderate left ventricular hypertrophy. Right Atrium Pacing lead seen in the right atrium. Right atrium is normal size . Right Ventricle Pacemaker lead seen in right ventricle. Normal right ventricle size and function. Mitral Valve Normal mitral valve structure and function. Mild mitral regurgitation. Aortic Valve Aortic valve is trileaflet. No aortic insufficiency. No aortic stenosis. Tricuspid Valve Normal tricuspid valve structure and function. Mild tricuspid regurgitation. Estimated right ventricular systolic pressure is 42 mmHg. Mildly elevated right ventricular systolic pressure. Pulmonic Valve Pulmonic valve not well visualized. No pulmonic insufficiency. Pericardial Effusion Trivial pericardial effusion. Miscellaneous Normal aortic root dimension. E/e' average 12.5 IVC normal diameter & inspiratory collapse indicating normal RA filling pressure .  M-mode / 2D Measurements & Calculations:   LVIDd:3.33 cm(3.7 - 5.6 cm)      Diastolic LVNOKV:77.9 ml  QLXMM:8.19 cm(2.2 - 4.0 cm)      Systolic OSSZDF:67.5 ml  WGEC:9.99 cm(0.6 - 1.1 cm)       Aortic Root:3.6 cm(2.0 - 3.7 cm)  LVPWd:1.32 cm(0.6 - 1.1 cm)      LA Dimension: 2.01 cm(1.9 - 4.0 cm)  Fractional Shortenin.34 %    LA volume/Index: 17.2 ml /11m^2  Calculated LVEF (%): 73.35 %     LVOT:2.4 cm   Mitral:                                Aortic   Peak E-Wave: 0.77 m/s                  Peak Velocity: 1.75 m/s  Peak A-Wave: 1.09 m/s                  Mean Velocity: 1.21 m/s  E/A Ratio: 0.7                         Peak Gradient: 12.25 mmHg  Peak Gradient: 2.35 mmHg               Mean Gradient: 7 mmHg  Mean Gradient: 3 mmHg  Deceleration Time: 123 msec                                         Area (continuity): 3.9 cm^2                                         AV VTI: 29.6 cm   Area (continuity): 4.4 cm^2  Mean Velocity: 0.73 m/s   Tricuspid:                             Pulmonic:   Estimated RVSP: 42 mmHg  Peak TR Velocity: 3.12 m/s  Peak TR Gradient: 38.9376 mmHg  Estimated RA Pressure: 3 mmHg                                         Estimated PASP: 41.94 mmHg  Septal Wall E' velocity:0.06 m/s Lateral Wall E' velocity:0.07 m/s    Xr Ankle Right (2 Views)    Result Date: 12/9/2020  EXAMINATION: 3 XRAY VIEWS OF THE RIGHT FOOT; 3 XRAY VIEWS OF THE RIGHT ANKLE 12/9/2020 1:30 pm COMPARISON: None. HISTORY: ORDERING SYSTEM PROVIDED HISTORY: foot pain TECHNOLOGIST PROVIDED HISTORY: foot pain Reason for Exam: foot pain. best images per pt condition Acuity: Acute Type of Exam: Initial; ORDERING SYSTEM PROVIDED HISTORY: foot pain TECHNOLOGIST PROVIDED HISTORY: foot pain Reason for Exam: foot pain. best images per pt condition FINDINGS: Right foot: Mottled osteopenia. Anatomic alignment. No acute fracture. The joint spaces appear normal. Left ankle: Anatomic alignment. No fracture. Mottled osteopenia. 1. No acute bony or joint abnormality 2.  Mottled osteopenia     Xr Foot Right (2 Views)    Result Date: 12/9/2020 EXAMINATION: 3 XRAY VIEWS OF THE RIGHT FOOT; 3 XRAY VIEWS OF THE RIGHT ANKLE 12/9/2020 1:30 pm COMPARISON: None. HISTORY: ORDERING SYSTEM PROVIDED HISTORY: foot pain TECHNOLOGIST PROVIDED HISTORY: foot pain Reason for Exam: foot pain. best images per pt condition Acuity: Acute Type of Exam: Initial; ORDERING SYSTEM PROVIDED HISTORY: foot pain TECHNOLOGIST PROVIDED HISTORY: foot pain Reason for Exam: foot pain. best images per pt condition FINDINGS: Right foot: Mottled osteopenia. Anatomic alignment. No acute fracture. The joint spaces appear normal. Left ankle: Anatomic alignment. No fracture. Mottled osteopenia. 1. No acute bony or joint abnormality 2. Mottled osteopenia     Xr Acute Abd Series Chest 1 Vw    Result Date: 12/12/2020  EXAMINATION: TWO XRAY VIEWS OF THE ABDOMEN AND SINGLE  XRAY VIEW OF THE CHEST 12/12/2020 5:02 pm COMPARISON: May 28, 2019 HISTORY: ORDERING SYSTEM PROVIDED HISTORY: r/o SBO TECHNOLOGIST PROVIDED HISTORY: r/o SBO Reason for Exam: Irregular heart rate poss SBO Acuity: Acute Type of Exam: Initial Additional signs and symptoms: Irregular heart rate poss SBO FINDINGS: Bipolar pacer on the left. Right PICC line unchanged. Bibasilar airspace disease. Mild edema. No subdiaphragmatic air. Small effusions bilaterally. Gas-filled loops of small bowel. Retained oral contrast versus calcific lesion in the pelvis. Tran catheter in the bladder. Diffuse demineralization. Ileus. Bibasilar airspace disease.      Ct Chest Wo Contrast    Result Date: 12/6/2020 abnormality. There are old left-sided rib fractures. Approximately 4.6 x 4.4 x 3.5 cm cavitary right lower lobe mass, similar to the recent abdomen and pelvis CT from 12/03/2020. This is new from a CT dated 07/25/2019. This could represent a cavitary pneumonia however a primary lung neoplasm is not excluded. Recommend short-term follow-up CT after treatment to demonstrate interval change. Rounded opacity in the left lower lobe broadly abutting the pleura could represent rounded atelectasis or infection. Additional airspace opacities in the left lower lobe are suspicious for infection. This area can also be followed up with CT. Small bilateral pleural effusions. 3 mm right upper lobe pulmonary nodule. Follow-up recommendations are below. Suspected enlarged lymph node in the left upper mediastinum posterior to the medial left clavicle, nonspecific. RECOMMENDATIONS: Fleischner Society guidelines for follow-up and management of incidentally detected pulmonary nodules: Nodule size less than 6 mm In a low-risk patient, no routine follow-up. In a high-risk patient, optional CT at 12 months. - Low risk patients include individuals with minimal or absent history of smoking and other known risk factors. - High risk patients include individuals with a history or smoking or known risk factors. Radiology 2017 http://pubs. rsna.org/doi/full/10.1148/radiol. 3491987832     Ct Abdomen Pelvis W Iv Contrast Additional Contrast? None    Result Date: 12/3/2020 EXAMINATION: CT OF THE ABDOMEN AND PELVIS WITH CONTRAST 12/3/2020 2:49 am TECHNIQUE: CT of the abdomen and pelvis was performed with the administration of intravenous contrast. Multiplanar reformatted images are provided for review. Dose modulation, iterative reconstruction, and/or weight based adjustment of the mA/kV was utilized to reduce the radiation dose to as low as reasonably achievable. COMPARISON: August 13, 2019. HISTORY: ORDERING SYSTEM PROVIDED HISTORY: global abdominal pain TECHNOLOGIST PROVIDED HISTORY: global abdominal pain Reason for Exam: Fatigue, abd pain. Due to patient condition, unable to have patient lay flat  or bring arms above head Acuity: Acute Type of Exam: Initial Relevant Medical/Surgical History: History, Gtube/peg tube placement, cholecystectomy FINDINGS: Lower Chest: Partially visualized left lower lobe heterogeneous consolidation. Trace left pleural fluid with pleural thickening. Right lower lobe 4.2 x 3.3 cm masslike opacity with central necrosis, central air-fluid level and mild surrounding heterogeneous/nodular opacities. Emphysema. Partially visualized cardiac pacer leads. Atherosclerosis of the visualized thoracic aorta. Liver: Few scattered subcentimeter hypodensities in the liver are similar to the prior study and likely represent benign cysts. No new suspicious liver mass. Smooth liver contour. Gallbladder and Bile Ducts: Prior cholecystectomy. Spleen: Normal. Adrenal Glands: Normal. Pancreas: Normal. Genitourinary: Normal. Bowel: The stomach is incompletely distended and not well evaluated. Postsurgical changes of the bowel. Normal caliber bowel. Fluid throughout the colon. Vasculature: Atherosclerosis. No abdominal aortic aneurysm. Patent main portal vein. Bones and Soft Tissues: Osteopenia. Degenerative changes in the visualized spine and pelvis. L3 superior endplate 87-84% height loss was not present on the prior study but appears to be chronic. EXAMINATION: ONE XRAY VIEW OF THE CHEST 12/15/2020 7:03 am COMPARISON: Chest radiograph performed 12/14/2020. HISTORY: ORDERING SYSTEM PROVIDED HISTORY: ETT placement while on vent TECHNOLOGIST PROVIDED HISTORY: ETT placement while on vent Reason for Exam: on vent Acuity: Acute Type of Exam: Initial FINDINGS: There are bilateral airspace opacities most pronounced in the perihilar regions. There are bilateral effusions. There is no pneumothorax. The heart is enlarged. The upper abdomen unremarkable. The extrathoracic soft tissues are unremarkable. There is endotracheal tube with the tip in the proximal midtrachea. There is a gastric tube and the tip is not visualized. There is a right-sided PICC line the tip in the mid SVC. Cardiomegaly with bilateral airspace opacities most pronounced in the perihilar regions. Bilateral effusions with the left-sided effusion being larger compared to prior. Xr Chest Portable    Result Date: 12/14/2020  EXAMINATION: ONE XRAY VIEW OF THE CHEST 12/14/2020 6:09 am COMPARISON: Chest radiograph performed 12/13/2020. HISTORY: ORDERING SYSTEM PROVIDED HISTORY: ETT placement while on vent TECHNOLOGIST PROVIDED HISTORY: ETT placement while on vent Reason for Exam: SOB Acuity: Chronic Type of Exam: Ongoing FINDINGS: There are scattered bilateral pulmonary opacities. There are bilateral effusions. There is no pneumothorax. The mediastinal structures are stable with stable cardiac leads. The upper abdomen is unremarkable. The extrathoracic soft tissues are unremarkable. There is an endotracheal tube with tip in the midtrachea in stable position. There is a gastric tube and the tip is not visualized. There is a right-sided PICC line with the tip in the proximal SVC. Scattered bilateral pulmonary opacities with bilateral effusions. Support tubes as described above.      Xr Chest Portable    Result Date: 12/13/2020 EXAMINATION: ONE XRAY VIEW OF THE CHEST 12/13/2020 7:41 pm COMPARISON: December 13, 2020 HISTORY: ORDERING SYSTEM PROVIDED HISTORY: Intubation TECHNOLOGIST PROVIDED HISTORY: Intubation Reason for Exam: intubation Acuity: Acute Type of Exam: Initial FINDINGS: New ET tube terminates 4 cm above the ron. Enteric tube in the stomach. Bipolar pacer on the left. PICC line on the right terminates in the superior vena cava. Heart and mediastinum normal.  Moderate diffuse interstitial infiltrates unchanged. Bony thorax intact. New ET and enteric tubes as above. Interstitial infiltrates unchanged. Xr Chest Portable    Result Date: 12/13/2020  EXAMINATION: ONE XRAY VIEW OF THE CHEST 12/13/2020 5:41 pm COMPARISON: December 13, 2020 HISTORY: ORDERING SYSTEM PROVIDED HISTORY: worsening respiratory status TECHNOLOGIST PROVIDED HISTORY: worsening respiratory status Reason for Exam: worsening respiratory status Acuity: Acute Type of Exam: Initial Additional signs and symptoms: worsening respiratory status FINDINGS: Right greater than left airspace disease. Bipolar pacer on the left unchanged. Heart and mediastinum normal.  Small left effusion unchanged. Bony thorax intact.      Moderately severe airspace disease increased     Xr Chest Portable    Result Date: 12/13/2020 EXAMINATION: ONE XRAY VIEW OF THE CHEST 12/13/2020 9:17 am COMPARISON: 12/10/2020, 1802 hours HISTORY: ORDERING SYSTEM PROVIDED HISTORY: acute hypoxic respiratory failure, suspicion for hospital aquired pneumonia. TECHNOLOGIST PROVIDED HISTORY: acute hypoxic respiratory failure, suspicion for hospital aquired pneumonia. Reason for Exam: acute hypoxic respiratory failure, suspicion for hospital aquired pneumonia Acuity: Acute Additional signs and symptoms: acute hypoxic respiratory failure, suspicion for hospital aquired pneumonia 78-year-old female with acute hypoxic respiratory failure FINDINGS: Portable AP upright view of the chest. Left-sided subclavian approach cardiac pacemaker device in place. Cardiac monitor leads overlie the chest. Right-sided PICC distal tip overlying the cavoatrial junction. Atherosclerotic calcification of the aortic knob. Stable cardiomegaly. Cardiac and mediastinal contours remain unchanged. Trachea midline. No pneumothorax. No free air. Stable small bilateral pleural effusions and bilateral mid and lower zone predominant airspace disease. Underlying COPD. Mild pulmonary vascular congestion. Visualized osseous structures remain unchanged. 1. Stable small bilateral pleural effusions and bilateral mid/lower zone predominant airspace disease. 2. Underlying COPD. 3. Mild pulmonary vascular congestion. Stable cardiomegaly. 4. Right-sided PICC distal tip overlying the cavoatrial junction.      Xr Chest Portable    Result Date: 12/10/2020 EXAMINATION: ONE XRAY VIEW OF THE CHEST 12/10/2020 6:10 pm COMPARISON: December 8, 2020 HISTORY: ORDERING SYSTEM PROVIDED HISTORY: PNA; copious sputum TECHNOLOGIST PROVIDED HISTORY: PNA; copious sputum Reason for Exam: PNA; copious sputum Acuity: Acute Type of Exam: Subsequent/Follow-up Additional signs and symptoms: PNA; copious sputum FINDINGS: Bipolar pacer on the left unchanged. New PICC line on the right terminates 3 cm below the atrial caval junction. Right lower lobe airspace disease. Small bilateral pleural effusions. Heart and mediastinum normal.  Bony thorax intact. New PICC line on the right as above. No change right lower lobe airspace disease. No change bilateral pleural effusions. Xr Chest Portable    Result Date: 12/8/2020  EXAMINATION: ONE XRAY VIEW OF THE CHEST 12/8/2020 9:30 am COMPARISON: 8 December 2020 at 826 hours HISTORY: ORDERING SYSTEM PROVIDED HISTORY: r/o pneumothorax R-side. inconclusive on recent prior exam TECHNOLOGIST PROVIDED HISTORY: r/o pneumothorax R-side. inconclusive on recent prior exam Reason for Exam: This is the second xray taken of this patient in 1 hour. The patient would not cooperate on the previous xray and she did not cooperate on this xray. The patient talked and moved during the whole exam. Acuity: Unknown Type of Exam: Unknown FINDINGS: AP portable view of the chest time stamped at 951 hours demonstrates overlying cardiac monitoring electrodes. Heart size is stable. Bipolar pacemaker enters from the left with intact leads in appropriate positions. There is no change in bibasilar opacities and small bilateral effusions. No sizable extrapleural air. Small linear area of hypodensity is present peripherally in the right upper hemithorax which may be related to trace extrapleural air. No sizable pneumothorax. Linear lucency laterally in the right upper hemithorax suspicious for trace extrapleural air. No change in bibasilar opacities. EXAMINATION: ONE XRAY VIEW OF THE CHEST 12/3/2020 5:17 am COMPARISON: Earlier same day. HISTORY: ORDERING SYSTEM PROVIDED HISTORY: post RIJ central line TECHNOLOGIST PROVIDED HISTORY: post RIJ central line Reason for Exam: Right central line placement. Acuity: Acute Type of Exam: Initial Additional signs and symptoms: Right central line placement. FINDINGS: Interval placement of a right IJ central venous catheter with tip in the mid SVC. No pneumothorax. Redemonstration of low left lung volume and hyperexpanded right lung volume. Similar appearing left greater than right basilar airspace disease with left basilar consolidation and right lateral basilar masslike opacity. Small left pleural effusion. Stable left pectoral trans venous dual-chamber cardiac pacer device. Stable cardiomediastinal silhouette and great vessels. Interval placement of a right IJ central venous catheter with tip in the mid SVC. No pneumothorax. Otherwise, no significant interval change with redemonstration of bilateral basilar airspace disease with left basilar consolidation and right lateral basilar masslike opacity. Continued attention on follow-up recommended. Small left pleural effusion.      Xr Chest Portable    Result Date: 12/3/2020 EXAMINATION: ONE XRAY VIEW OF THE CHEST 12/3/2020 2:20 am COMPARISON: May 29, 2019. HISTORY: ORDERING SYSTEM PROVIDED HISTORY: weakness TECHNOLOGIST PROVIDED HISTORY: weakness Reason for Exam: Pt c/o weakness, low blood pressure. Acuity: Acute Type of Exam: Initial Additional signs and symptoms: Pt c/o weakness, low blood pressure. FINDINGS: Frontal portable view of the chest.  Low left lung volume. Hyperexpanded right lung volume. Leftward shift of the mediastinal structures. Left greater than right basilar heterogeneous opacities. Small bilateral pleural effusions versus pleural thickening. No pneumothorax. Atherosclerotic thoracic aorta. No significant cardiomegaly. Left pectoral trans venous dual-chamber cardiac pacer device. Osteopenia. Multilevel degenerative disc disease. Left greater than right basilar heterogeneous opacities. Differential considerations include atelectasis/scarring, asymmetric pulmonary edema and an infectious/inflammatory process. Follow-up PA and lateral chest radiographs or chest CT may be helpful for further evaluation as warranted. Small bilateral pleural effusions versus pleural thickening. Low left lung volume with leftward shift of the mediastinal structures. Hyperexpanded right lung volume.      Fl Modified Barium Swallow W Video    Result Date: 12/7/2020

## 2020-12-18 ENCOUNTER — TELEPHONE (OUTPATIENT)
Dept: INTERNAL MEDICINE CLINIC | Age: 70
End: 2020-12-18

## 2020-12-18 NOTE — TELEPHONE ENCOUNTER
Received call from Jill K[address]. 26.  on 12/15 at [facility]  Would you sign death certificate?

## 2020-12-19 LAB
CULTURE: NORMAL
Lab: NORMAL
SPECIMEN DESCRIPTION: NORMAL

## 2020-12-21 LAB
ABO/RH: NORMAL
ANTIBODY SCREEN: NEGATIVE
ARM BAND NUMBER: NORMAL
BLD PROD TYP BPU: NORMAL
CROSSMATCH RESULT: NORMAL
DISPENSE STATUS BLOOD BANK: NORMAL
EXPIRATION DATE: NORMAL
TRANSFUSION STATUS: NORMAL
UNIT DIVISION: 0
UNIT NUMBER: NORMAL

## 2020-12-22 NOTE — TELEPHONE ENCOUNTER
[employer] services dropped off Death Certificate     Certificate given to bruce to take to hospital for  to sign

## 2022-05-14 NOTE — PLAN OF CARE
- Pulmonology following      Problem: Risk for Impaired Skin Integrity  Goal: Tissue integrity - skin and mucous membranes  Description  Structural intactness and normal physiological function of skin and  mucous membranes. 4/16/2019 0546 by Merissa Griffin RN  Outcome: Ongoing  4/15/2019 1728 by Kelly Paul RN  Outcome: Ongoing  Note:   Patient turned and repositioned every two hours. Oral care provided every two hours. Patient turned q 2 hours when in bed, position changed hourly. Pressure redistribution device(s) in use. Barrier cream applied when providing kimmy care. Problem: Falls - Risk of:  Goal: Will remain free from falls  Description  Will remain free from falls  4/16/2019 0546 by Merissa Griffin RN  Outcome: Ongoing  4/15/2019 1728 by Kelly Paul RN  Outcome: Ongoing  Note:   Patient remained free from falls. Call light within reach. Pt remains free from falls. Bed alarm on. Call light within reach.    Goal: Absence of physical injury  Description  Absence of physical injury  4/15/2019 1728 by Kelly Paul RN  Outcome: Ongoing     Problem: Infection, Septic Shock:  Goal: Will show no infection signs and symptoms  Description  Will show no infection signs and symptoms  4/15/2019 1728 by Kelly Paul RN  Outcome: Ongoing     Problem: Tissue Perfusion, Altered:  Goal: Circulatory function within specified parameters  Description  Circulatory function within specified parameters  4/15/2019 1728 by Kelly Paul RN  Outcome: Ongoing     Problem: Pain:  Goal: Pain level will decrease  Description  Pain level will decrease  4/16/2019 0546 by Merissa Griffin RN  Outcome: Ongoing  4/15/2019 1728 by Kelly Paul RN  Outcome: Ongoing  Goal: Control of acute pain  Description  Control of acute pain  4/15/2019 1728 by Kelly Paul RN  Outcome: Ongoing  Goal: Control of chronic pain  Description  Control of chronic pain  4/15/2019 1728 by Kelly Paul RN  Outcome: Ongoing     Problem: Nutrition  Goal: Optimal

## 2022-06-20 NOTE — PLAN OF CARE
Problem: Risk for Impaired Skin Integrity  Goal: Tissue integrity - skin and mucous membranes  Description  Structural intactness and normal physiological function of skin and  mucous membranes. Outcome: Met This Shift  1. Skin integrity assessed and maintained PRN   2. Repositioned every 2 hours and PRN  3. Barrier cream applied as needed      Problem: Falls - Risk of:  Goal: Will remain free from falls  Description  Will remain free from falls  Outcome: Met This Shift  1. Pt remains free from falls. 2. Bed in lowest position. 3. Call light within reach. 4. Hourly rounds continue. Problem: Falls - Risk of:  Goal: Absence of physical injury  Description  Absence of physical injury  Outcome: Met This Shift  1. Pt remains free from accidental injuries. 2. Fall precautions in place. 3. Bed in low position, wheels locked, and call light within reach. 4. ID band in place and checked frequently. Problem: Pain:  Goal: Pain level will decrease  Description  Pain level will decrease  Outcome: Met This Shift  Pt continues to c/o pain this shift. 2. Educated on numeric pain rating scale  3. Non-pharmacologic pain interventions discussed  4. Rest and relaxation techniques explored   5.  PRN fentanyl administered x2 this shift [employer] Counseling:  I discussed with the patient the risks of mycophenolate mofetil including but not limited to infection/immunosuppression, GI upset, hypokalemia, hypercholesterolemia, bone marrow suppression, lymphoproliferative disorders, malignancy, GI ulceration/bleed/perforation, colitis, interstitial lung disease, kidney failure, progressive multifocal leukoencephalopathy, and birth defects.  The patient understands that monitoring is required including a baseline creatinine and regular CBC testing. In addition, patient must alert us immediately if symptoms of infection or other concerning signs are noted.

## 2022-08-15 NOTE — PROGRESS NOTES
[address] [facility].    PROGRESS NOTE             5/2/2019    1:52 PM    Name:   Elo Worley  MRN:     [id number]     Acct:      [de-identified]   Room:   [facility]  IP Day:  21  Admit Date:  4/11/2019  2:48 PM    PCP:  Brandy Winchester DO  Code Status:  Full Code    Subjective:     C/C:   Chief Complaint   Patient presents with    Abdominal Pain     Interval History Status: not changed. Patient seen bedside on progressive. No acute events overnight. VSS. ESR and CRP trending down. CXR showed worsening interstitial edema. Cont. Lasix   Urine output adequate. Per nurse, noticed reddish stool. Will f/u fecal occult   Hb 7.2     Brief History:     Per EMR:      The patient is a 68 y.o.   Catherine Dionne presents withAbdominal Pain   and she is admitted to the hospital for the management of abdominal distension, nausea, vomiting, and acute cystitis.      Patient presents with chills, nausea, vomiting, abdominal pain (diffuse, but worse in the suprapubic region), abdominal distension, and dysuria of 2-3 days duration. Denies hematemesis. Acknowledges difficulty keeping food down but tolerating fluids. States abdominal pain is a 6/10 on average, and denies trying any medication to alleviate her sx. Review of Systems:     Review of Systems   Constitutional: Negative for chills, fatigue and fever. Eyes: Negative for visual disturbance. Respiratory: Negative for shortness of breath. Cardiovascular: Negative for chest pain, palpitations and leg swelling. Gastrointestinal: Positive for diarrhea and nausea. Negative for abdominal pain, constipation and vomiting. Genitourinary: Negative for flank pain and pelvic pain. Musculoskeletal: Negative for myalgias. Neurological: Negative for weakness, light-headedness and headaches. Psychiatric/Behavioral: Negative for confusion and decreased concentration. (!) 105/57   Pulse 100   Temp 97.8 °F (36.6 °C)   Resp 18   Ht 5' (1.524 m)   Wt 108 lb (49 kg)   SpO2 97%   BMI 21.09 kg/m²   Temp (24hrs), Av.9 °F (36.6 °C), Min:97.3 °F (36.3 °C), Max:99 °F (37.2 °C)    Recent Labs     19  0259 19  0608 19  0724 19  1126   POCGLU 120* 128* 133* 137*       I/O(24Hr): Intake/Output Summary (Last 24 hours) at 2019 1352  Last data filed at 2019 0100  Gross per 24 hour   Intake 922 ml   Output 650 ml   Net 272 ml       Labs: This SmartLink has not been configured with any valid records. Lab Results   Component Value Date    WBC 7.2 2019    HGB 7.2 (L) 2019    HCT 22.0 (L) 2019    MCV 89.5 2019     (L) 2019     Lab Results   Component Value Date     (L) 2019    K 4.2 2019     2019    CO2 26 2019    BUN 18 2019    CREATININE <0.40 (L) 2019    GLUCOSE 120 (H) 2019    CALCIUM 7.5 (L) 2019    PROT 4.5 (L) 2019    LABALBU 1.5 (L) 2019    BILITOT 0.34 2019    ALKPHOS 97 2019    AST 20 2019    ALT 13 2019    LABGLOM CANNOT BE CALCULATED 2019    GFRAA CANNOT BE CALCULATED 2019    GLOB NOT REPORTED 2019           Lab Results   Component Value Date/Time    SPECIAL NOT REPORTED 2019 10:10 PM     Lab Results   Component Value Date/Time    CULTURE (A) 2019 04:59 PM     YEAST, NOT CANDIDA ALBICANS OR CANDIDA DUBLINIENSIS SCANT GROWTH    CULTURE (A) 2019 04:59 PM     KLEBSIELLA PNEUMONIAE ONE COLONY FORMING UNIT THIS ORGANISM IS AN EXTENDED-SPECTRUM BETA-LACTAMASE  AND RESISTANCE TO THERAPY WITH PENICILLINS, CEPHALOSPORINS AND AZTREONAM IS EXPECTED. THESE ORGANISMS GENERALLY REMAIN SUSCEPTIBLE TO CARBAPENEMS. CONSIDER ID CONSULTATION.     CULTURE NO ANAEROBIC ORGANISMS ISOLATED AT 5 DAYS (A) 2019 04:59 PM       [facility]    Radiology:    Ct Abdomen Pelvis Wo aortic caliber. Moderate calcific atherosclerosis. Bones/Soft Tissues: No acute osseous abnormality. Diffuse anasarca. Moderate degenerative changes in the lumbar spine. 1. Distended, contrast filled stomach with reflux of contrast into the esophagus. No enteric contrast is seen distal to the pylorus suggesting delayed gastric emptying in the setting of ileus. 2. New moderate layering bilateral pleural effusions with bilateral lower lobe atelectasis. 3. Small amount of nonspecific free fluid in the pelvic cavity. 4. Anasarca. 5. Cholelithiasis. Xr Chest (single View Frontal)    Result Date: 5/2/2019  EXAMINATION: SINGLE XRAY VIEW OF THE CHEST 5/2/2019 6:34 am COMPARISON: 29 April 2019 HISTORY: ORDERING SYSTEM PROVIDED HISTORY: COPD TECHNOLOGIST PROVIDED HISTORY: COPD Ordering Physician Provided Reason for Exam: COPD Acuity: Acute Type of Exam: Initial FINDINGS: AP portable view of the chest time stamped at 630 hours demonstrates stable cardiac size. Overlying cardiac monitoring electrodes are present. Right-sided PICC line terminates in the right atrium. Bipolar pacemaker enters from the left with intact leads in appropriate positions. There is been no significant interval change in bilateral interstitial opacities, representing interval change since 2015. This may be related to worsening interstitial disease or superimposed venous congestion. Bilateral effusions are noted with bibasilar opacities, either atelectasis or edema. Continued bilateral effusions, bibasilar opacities and bilateral interstitial opacities in the upper lobes. Findings may be related to worsening interstitial disease and edema. Airspace disease is not excluded.      Xr Chest (single View Frontal)    Result Date: 4/13/2019  EXAMINATION: SINGLE XRAY VIEW OF THE CHEST 4/13/2019 7:18 am COMPARISON: April 12, 2019 HISTORY: ORDERING SYSTEM PROVIDED HISTORY: dyspnea TECHNOLOGIST PROVIDED HISTORY: dyspnea Ordering Physician Provided Reason for Exam: dyspnea Acuity: Acute Type of Exam: Subsequent/Follow-up Additional signs and symptoms: dyspnea FINDINGS: A right IJ catheter is seen with its tip terminating at the superior cavoatrial junction. The left chest wall pacemaker and leads are stable. The cardiomediastinal silhouette is stable. There is interval increased opacity at the right lung base, may be related to atelectasis versus pneumonia. There is small atelectasis and mild pleural effusion at the left lung base. There is no pneumothorax. There is no acute osseous abnormality. Interval increased opacity at the right lung base, may be related to atelectasis versus pneumonia. Small atelectasis and mild pleural effusion at the left lung, new since the prior study. Xr Chest Standard (2 Vw)    Result Date: 4/29/2019  EXAMINATION: TWO VIEWS OF THE CHEST 4/29/2019 8:04 pm COMPARISON: 04/27/2019 HISTORY: ORDERING SYSTEM PROVIDED HISTORY: Follow-up lung infiltrate TECHNOLOGIST PROVIDED HISTORY: Follow-up lung infiltrate Ordering Physician Provided Reason for Exam: Follow-up infiltrate. Pt unable to lean forward - unable to get proper sponge behind pt for lateral. Pt unable to raise left arm at all for lateral. Best possible lateral obtained. Acuity: Unknown Type of Exam: Unknown Additional signs and symptoms: Follow-up infiltrate. Pt unable to lean forward - unable to get proper sponge behind pt for lateral. Pt unable to raise left arm at all for lateral. Best possible lateral obtained. FINDINGS: Left chest cardiac pacemaker device is in place. Right IJ central venous catheter in place with distal tip at the cavoatrial junction. Heart size is within normal limits. No vascular congestion. There are small to moderate pleural effusions. There are interstitial opacities in the upper lungs, which could be related to scarring and/or developing infiltrate, slightly improved in appearance when compared to 04/27/2019.   No evidence of pneumothorax. 1.  Small to moderate bilateral pleural effusions, better visualized on lateral view. 2.  Upper lobe interstitial opacities, slightly improved when compared to the previous study, possibly related to underlying fibrotic change or residual infiltrate. Xr Abdomen (kub) (single Ap View)    Result Date: 4/19/2019  EXAMINATION: SINGLE SUPINE XRAY VIEW(S) OF THE ABDOMEN 4/19/2019 9:48 am COMPARISON: April 14, 2019 HISTORY: ORDERING SYSTEM PROVIDED HISTORY: pain TECHNOLOGIST PROVIDED HISTORY: pain Ordering Physician Provided Reason for Exam: Abdominal pain and distentsion Acuity: Acute Type of Exam: Initial Additional signs and symptoms: Abdominal pain and distentsion FINDINGS: Enteric tube with the tip within the stomach. Small left pleural effusion. Minimal right pleural effusion. Mildly dilated loops of bowel. Nonspecific bowel gas pattern with mildly dilated loops of bowel. Xr Abdomen (kub) (single Ap View)    Result Date: 4/14/2019  EXAMINATION: SINGLE SUPINE XRAY VIEW(S) OF THE ABDOMEN 4/14/2019 7:39 am COMPARISON: CT abdomen and pelvis  film from 11 April 2019 HISTORY: [address] [facility] [address]: Abd Distention TECHNOLOGIST PROVIDED HISTORY: Abd Distention Ordering Physician Provided Reason for Exam: Abdominal distention. Pt was moving around in the bed. Best films at present time. Acuity: Acute Type of Exam: Initial Additional signs and symptoms: Abdominal distention. Pt was moving around in the bed. Best films at present time. FINDINGS: Portable view time stamped at 748 hours demonstrates an intestinal tube terminating in the midportion of a gaseous Spike dilated stomach. Densities are present over the stomach likely medication. Bipolar pacemaker is in situ with intact leads. Heart size is top-normal, stable. Gaseous distension of the stomach and loop of bowel in the upper mid abdomen is noted but there is gas and fecal material in the rectum.   Gastric outlet obstruction or proximal small bowel partial obstruction is suspected. No free air is noted. Midline city is present over the pelvis likely a monitor or CT small bore catheter. Vascular calcification is present in the pelvis. Persistent preferential gaseous distension of the stomach although there is some gas in the upper abdomen and gas and fecal material present in the rectosigmoid. Findings suggest gastric outlet obstruction. Ct Abdomen Pelvis W Iv Contrast    Result Date: 4/11/2019  EXAMINATION: CT OF THE ABDOMEN AND PELVIS WITH CONTRAST 4/11/2019 5:14 pm TECHNIQUE: CT of the abdomen and pelvis was performed with the administration of intravenous contrast. Multiplanar reformatted images are provided for review. Dose modulation, iterative reconstruction, and/or weight based adjustment of the mA/kV was utilized to reduce the radiation dose to as low as reasonably achievable. COMPARISON: None. HISTORY: ORDERING SYSTEM PROVIDED HISTORY: Abdominal pain TECHNOLOGIST PROVIDED HISTORY: IV Only Contrast Ordering Physician Provided Reason for Exam: patient c/o abd pain for an hour FINDINGS: Lower Chest: Trace pleural fluid bilaterally is noted. Indwelling cardiac pacemaker is present. Heart size is normal.  Coronary artery calcifications are evident. The esophagus is significantly dilated and fluid-filled. No paraesophageal adenopathy is evident. Organs: The spleen, pancreas, and adrenals are unremarkable. The liver contains hypodensities, likely cysts. The gallbladder is distended and contains a solitary gallstone. The kidneys excrete contrast bilaterally. Extrarenal collecting systems are noted. The ureters are mildly dilated down to the urinary bladder without evidence of intraluminal or ureteral obstructing calculi. GI/Bowel: Marked distention of the stomach is noted; this continues to the pylorus with appearance suggesting partial gastric outlet obstruction.   Fluid is present in some small bowel protocol was observed. The right arm was prepped and draped in sterile fashion using maximum sterile barrier technique. Local anesthesia was achieved with lidocaine. A micropuncture needle was used to access the right basilic vein using ultrasound guidance. An ultrasound image demonstrating patency of the vein with needle tip located within it. An image was obtained and stored in PACs. A 0.018 guidewire was used to place a peel-a-way sheath and a 5 Syriac dual-lumen PICC was advanced with fluoroscopic guidance with the tip at the cavo-atrial junction. The catheter flushed easily and there was a good blood return. The catheter was secured to the skin. The patient tolerated the procedure well and there were no immediate complications. FINDINGS: Fluoroscopic image demonstrates the tip of the catheter at the cavo-atrial junction. Successful ultrasound and fluoroscopy guided PICC placement     Us Gallbladder Ruq    Result Date: 4/19/2019  EXAMINATION: RIGHT UPPER QUADRANT ULTRASOUND 4/19/2019 10:48 am COMPARISON: CT abdomen and pelvis 4/14/2018 HISTORY: ORDERING SYSTEM PROVIDED HISTORY: ABDOMINAL PAIN FINDINGS: Pancreas is not well visualized. No liver lesion. Gallbladder wall thickening. Gallbladder sludge. Cholelithiasis. Pericholecystic fluid. Common bile duct measures at the upper limits of normal at 7 mm. Findings suggesting acute cholecystitis. Xr Chest Portable    Result Date: 4/27/2019  EXAMINATION: SINGLE XRAY VIEW OF THE CHEST 4/27/2019 8:47 am COMPARISON: 04/26/2019 HISTORY: ORDERING SYSTEM PROVIDED HISTORY: Assess for pulm edema vs PNA TECHNOLOGIST PROVIDED HISTORY: Assess for pulm edema vs PNA Ordering Physician Provided Reason for Exam: cough, congestion Acuity: Acute Type of Exam: Initial FINDINGS: Right IJ central venous catheter is in place tip in the SVC right atrial junction. Pacer wires are in place. The heart and mediastinal structures are stable.   Pleural effusions are present with bibasilar atelectasis. Bilateral lung infiltrates are present in the upper lung fields. Persistent pleural effusions with bibasilar atelectasis and bilateral lung infiltrates with areas of consolidation developing in the upper lobes. Xr Chest Portable    Result Date: 4/26/2019  EXAMINATION: SINGLE XRAY VIEW OF THE CHEST 4/26/2019 6:51 am COMPARISON: April 24, 2019 HISTORY: ORDERING SYSTEM PROVIDED HISTORY: Pleural effusions TECHNOLOGIST PROVIDED HISTORY: Pleural effusions Ordering Physician Provided Reason for Exam: pleural effusion Acuity: Acute Type of Exam: Initial FINDINGS: Right IJ line in good position. Pacer device is stable. Cardiac leads overlie the chest.  Stable cardiomediastinal contours with calcified aortic arch. Left effusion and associated airspace disease, slightly decreased. Chronic blunting of right costophrenic sulcus may represent scarring or chronic effusion. Increased reticular markings right upper chest and left upper chest possibly interstitial edema or interstitial pneumonia. No new airspace consolidation. Increasing reticular markings upper chest bilaterally right greater than left possibly due to edema or interstitial pneumonitis. Improving left effusion with associated retrocardiac airspace disease. Pleural-parenchymal scarring or effusion in the right lung base, stable. Xr Chest Portable    Result Date: 4/24/2019  EXAMINATION: SINGLE XRAY VIEW OF THE CHEST 4/24/2019 6:05 am COMPARISON: Chest radiograph dated 04/22/2019 HISTORY: ORDERING SYSTEM PROVIDED HISTORY: Acute respiratory failure, pleural effusion TECHNOLOGIST PROVIDED HISTORY: Acute respiratory failure, pleural effusion Ordering Physician Provided Reason for Exam: pleural effusion, respiratory failure. Acuity: Acute Type of Exam: Initial FINDINGS: Heart size is normal.  Dual-chamber pacemaker placed via left subclavian approach. Right internal jugular central line has tip in distal superior vena cava. Interval removal of nasogastric tube. No interval change of infiltrate and atelectasis at the left lung base. Stable mild infiltrate in the left upper lobe. Mild interval improvement of infiltrate at the right lung base. Stable small bilateral pleural effusions, left larger than right. Mild CHF, stable. 1.  No interval change of infiltrate and atelectasis at the left lung base. Stable mild infiltrate in the left upper lobe. 2.  Mild interval improvement of infiltrate at the right lung base. 3.  Stable small bilateral pleural effusions, left larger than right. 4.  Mild CHF, stable. Xr Chest Portable    Result Date: 4/22/2019  EXAMINATION: SINGLE XRAY VIEW OF THE CHEST 4/22/2019 6:44 am COMPARISON: April 21, 2019. HISTORY: ORDERING SYSTEM PROVIDED HISTORY: Hypoxia and CHF TECHNOLOGIST PROVIDED HISTORY: Hypoxia and CHF Ordering Physician Provided Reason for Exam: hx of hypoxia/CHF Acuity: Acute Type of Exam: Initial FINDINGS: Right IJ central venous catheter appears in unchanged position. Nasogastric tube courses below the diaphragm, with tip not imaged. Left chest wall pacemaker device projects in unchanged position. Cardiac and mediastinal contours are enlarged but unchanged. Unchanged bilateral pleural effusions and bibasilar pulmonary opacities. Unchanged findings suggestive of congestive heart failure. No evidence of pneumothorax. No new osseous abnormalities. 1. No significant change in findings suggestive of congestive heart failure. 2. Unchanged bilateral pleural effusions and bibasilar pulmonary consolidations. RECOMMENDATION: Radiographic follow-up to complete resolution.      Xr Chest Portable    Result Date: 4/21/2019  EXAMINATION: SINGLE XRAY VIEW OF THE CHEST 4/21/2019 3:08 pm COMPARISON: April 19, 2019 HISTORY: ORDERING SYSTEM PROVIDED HISTORY: hypoxia TECHNOLOGIST PROVIDED HISTORY: hypoxia Ordering Physician Provided Reason for Exam: hypoxia Acuity: Unknown Type of Exam: Unknown FINDINGS: Enteric tube in the stomach. ET tube is been removed. Right IJ catheter terminates in the atrial caval junction. Bipolar pacer on the left unchanged. Heart and mediastinum unremarkable. Moderate edema unchanged. Small effusions, left greater than right. Bony thorax intact. Status post extubation. Life support apparatus otherwise stable. Moderate edema and effusions unchanged. Xr Chest Portable    Result Date: 4/19/2019  EXAMINATION: SINGLE XRAY VIEW OF THE CHEST 4/19/2019 5:51 am COMPARISON: April 18, 2019 HISTORY: ORDERING SYSTEM PROVIDED HISTORY: ETT placement TECHNOLOGIST PROVIDED HISTORY: ETT placement Ordering Physician Provided Reason for Exam: Vent Pt check ETT placement Acuity: Acute Type of Exam: Subsequent/Follow-up Additional signs and symptoms: Vent Pt check ETT placement FINDINGS: Tubes and lines are unchanged. Persistent bibasilar lung opacities and pleural effusions. Mild pulmonary edema. No significant interval change. Xr Chest Portable    Result Date: 4/18/2019  EXAMINATION: SINGLE XRAY VIEW OF THE CHEST 4/18/2019 6:21 am COMPARISON: April 17, 2019 HISTORY: ORDERING SYSTEM PROVIDED HISTORY: ETT placement TECHNOLOGIST PROVIDED HISTORY: ETT placement Ordering Physician Provided Reason for Exam: on vent Acuity: Acute Type of Exam: Initial FINDINGS: Right IJ line and NG tube are stable. Interval advancement of ET tube terminating 3.1 cm from ron. Implanted cardiac device is present. Left-sided effusion and associated airspace disease is stable. Hazy right basilar opacity possibly edema or atelectasis. No pneumothorax. Increased opacity left upper chest possibly focal area of atelectasis, new from prior. Advanced ETT from prior exam, now 3.1 cm from ron. Increased opacity left upper chest suspicious for atelectasis. Additional supporting devices are stable.      Xr Chest Portable    Result Date: 4/17/2019  EXAMINATION: SINGLE XRAY VIEW OF THE CHEST 4/17/2019 7:15 am COMPARISON: 16 April 2019 HISTORY: ORDERING SYSTEM PROVIDED HISTORY: ETT placement TECHNOLOGIST PROVIDED HISTORY: ETT placement Ordering Physician Provided Reason for Exam: on vent Acuity: Acute Type of Exam: Initial FINDINGS: AP portable view of the chest time stamped at 613 hours demonstrates overlying cardiac monitoring electrodes. A right internal jugular catheter terminates in the superior vena cava. Endotracheal tube is somewhat high riding terminating 6.4 cm above the ron. Intestinal tube extends beyond the body of the stomach, tip not included on the exam.  Bipolar pacemaker enters from the left with intact leads in appropriate positions. Heart size is normal.  Left pleural effusion is noted there is continued volume loss in the left hemithorax with stable mild vascular congestion, perihilar opacities, and faint opacity in the right mid and lower lung field. Underlying COPD is noted. Osseous structures are stable. There is little change from prior study. Findings of COPD, mild central vascular congestion, left effusion, and scattered opacities favoring interstitial edema with multifocal pneumonitis not excluded. Tubes and lines as above. However, endotracheal tube is high riding. The findings were sent to the Radiology Results [address] at 7:58 am on 4/17/2019to be communicated to a licensed caregiver. RECOMMENDATION: Suggest advancement of endotracheal tube.      Xr Chest Portable    Result Date: 4/16/2019  EXAMINATION: SINGLE XRAY VIEW OF THE CHEST 4/16/2019 6:54 am COMPARISON: 04/15/2019, 610 hours HISTORY: ORDERING SYSTEM PROVIDED HISTORY: ETT placement TECHNOLOGIST PROVIDED HISTORY: ETT placement Ordering Physician Provided Reason for Exam: on vent Acuity: Acute Type of Exam: Initial 78-year-old female on ventilator; check endotracheal tube placement FINDINGS: Portable AP upright view of the chest. Endotracheal tube distal tip overlying the mid trachea approximately 4.1 cm above the level of the ron. Enteric tube traverses the GE junction with distal tip excluded from the field of view. Left subclavian approach cardiac pacemaker device distal lead tips relatively stable in position. Right internal jugular approach central venous catheter distal tip overlying the high right atrium, stable. Cardiac monitor leads overlie the chest. Atherosclerotic calcification of the thoracic aorta. Slight stable volume loss of the left hemithorax. No pneumothorax. No free air. Dense retrocardiac/left basilar airspace consolidation and small left-sided pleural effusion. Stable mild focal opacity at the right mid lung zone. Underlying COPD. Stable mild pulmonary vascular congestion and left-sided predominant parahilar opacity. Visualized osseous structures remain unchanged. 1. Stable multifocal airspace disease as detailed above with dense retrocardiac/left basilar airspace consolidation and small left-sided pleural effusion. Mild pulmonary vascular congestion. Findings may represent edema or multifocal pneumonia. 2. Underlying COPD. 3. Tubes and line as detailed above. Xr Chest Portable    Result Date: 4/15/2019  EXAMINATION: SINGLE XRAY VIEW OF THE CHEST 4/15/2019 6:47 am COMPARISON: 14 April 2019 HISTORY: ORDERING SYSTEM PROVIDED HISTORY: ETT placement TECHNOLOGIST PROVIDED HISTORY: ETT placement Ordering Physician Provided Reason for Exam: on vent Acuity: Acute Type of Exam: Initial FINDINGS: AP portable view of the chest time stamped at 612 hours demonstrates overlying cardiac monitoring electrodes. Endotracheal tube terminates 4 cm above the ron. Bipolar pacemaker enters from the left with intact leads in appropriate positions. Intestinal tube extends beyond the fundus of the stomach, tip not included. Right internal jugular catheter terminates at the cavoatrial junction. Heart size is normal.  Aortic arch is calcified.   There is interval improvement redemonstration of atherosclerotic thoracic aorta. New right IJ central venous catheter with tip near the superior atrial caval junction. No pneumothorax. No new consolidation. Vl Upper Extremity Bilateral Venous Duplex    Result Date: 4/22/2019    [facility]  Vascular Upper Extremities Veins Procedure   Patient Name   Sharee Rojo     Date of Study           04/22/2019                 Gabriella Hobosn   Date of Birth  1950  Gender                  Female   Age            77 year(s)  Race                       Room Number    2002        Height:                 59.84 inch, 152 cm   Corporate ID # [id number]    Weight:                 102 pounds, 46.3 kg   Patient Acct # [de-identified]   BSA:        1.4 m^2     BMI:       20.03 kg/m^2   MR #           [id number]      Sonographer             Roderick Mario   Accession #    [id number]   Interpreting Physician  Katherine Hester   Referring                  Referring Physician     Tasha Kuhn *  Nurse  Practitioner  Procedure Type of Study:   Veins: Upper Extremities Veins, Venous Scan Upper Bilateral.  Indications for Study:Swelling. Patient Status: In Patient. Technical Quality:Limited visualization. Limitation reason:Line placements. Conclusions   Summary   No evidence of superficial or deep venous thrombosis in both upper  extremities. Signature   ----------------------------------------------------------------  Electronically signed by Roderick Mario(Sonographer) on  04/22/2019 11:46 AM  ----------------------------------------------------------------   ----------------------------------------------------------------  Electronically signed by Genie SilvaInterpreting  physician) on 04/22/2019 09:31 PM  ----------------------------------------------------------------  Findings:   Right Impression:                  Left Impression:  Internal jugular vein not          The internal jugular, subclavian,  visualized due to line placement.   axillary, brachial, radial, and ulnar                                     veins demonstrate normal  Subclavian, axillary, brachial,    compressibility with phasic Doppler  radial, and ulnar veins            signals. demonstrate normal compressibility  with phasic Doppler signals. Normal compressibility of the cephalic                                     vein. Normal compressibility of the  cephalic vein. Normal compressibility of the basilic                                     vein. Normal compressibility of the  basilic vein. Velocities are measured in cm/s ; Diameters are measured in cm Right UE Vein Measurements 2D Measurements +------------------------------------+----------+---------------+----------+ ! Location                            ! Visualized! Compressibility! Thrombosis! +------------------------------------+----------+---------------+----------+ ! Prox SCV                            ! Yes       ! Yes            ! None      ! +------------------------------------+----------+---------------+----------+ ! Dist SCV                            ! Yes       ! Yes            ! None      ! +------------------------------------+----------+---------------+----------+ ! Prox Axillary                       ! Yes       ! Yes            ! None      ! +------------------------------------+----------+---------------+----------+ ! Dist Axillary                       ! Yes       ! Yes            ! None      ! +------------------------------------+----------+---------------+----------+ ! Prox Brachial                       !Yes       ! Yes            ! None      ! +------------------------------------+----------+---------------+----------+ ! Dist Brachial                       !Yes       ! Yes            ! None      ! +------------------------------------+----------+---------------+----------+ ! Prox Radial                         !Yes       ! Yes            ! None      ! +------------------------------------+----------+---------------+----------+ ! Dist Radial                         !Yes       ! Yes            ! None      ! +------------------------------------+----------+---------------+----------+ ! Prox Ulnar                          ! Yes       ! Yes            ! None      ! +------------------------------------+----------+---------------+----------+ ! Dist Ulnar                          ! Yes       ! Yes            ! None      ! +------------------------------------+----------+---------------+----------+ ! Basilic at UA                       ! Yes       ! Yes            ! None      ! +------------------------------------+----------+---------------+----------+ ! Basilic at AF                       ! Yes       ! Yes            ! None      ! +------------------------------------+----------+---------------+----------+ ! Basilic at [address]                       ! Yes       ! Yes            ! None      ! +------------------------------------+----------+---------------+----------+ ! Cephalic at UA                      ! Yes       ! Yes            ! None      ! +------------------------------------+----------+---------------+----------+ ! Cephalic at AF                      ! Yes       ! Yes            ! None      ! +------------------------------------+----------+---------------+----------+ ! Cephalic at [address]                      ! Yes       ! Yes            ! None      ! +------------------------------------+----------+---------------+----------+ Doppler Measurements +-------------------------+-----------------------+------------------------+ ! Location                 ! Signal                 !Reflux                  ! +-------------------------+-----------------------+------------------------+ ! SCV                      ! Phasic                 ! No                      ! +-------------------------+-----------------------+------------------------+ ! Axillary                 ! Phasic                 ! No                      ! cholecystitis TECHNOLOGIST PROVIDED HISTORY: acute cholecystitis SEDATION: 75 mcg IV fentanyl for pain TECHNIQUE: Informed consent was obtained after a detailed explanation of the procedure including risks, benefits, and alternatives. Universal protocol was followed. A suitable skin site was prepped and draped in sterile fashion following ultrasound localization. An 18 gauge needle was advanced under ultrasound guidance into the gallbladder via transhepatic route and a 0.035 guidewire was used to place a 8 [place] cholecystostomy tube under fluoroscopic guidance. The catheter was sutured to the skin and the patient tolerated the procedure well. Thick bilious material was sent for laboratory analysis. The catheter was attached to gravity drainage. FINDINGS: Ultrasound image demonstrates distended gallbladder. Bilious fluid was sent for culture. Successful placement of transhepatic 8 [place] percutaneous cholecystostomy tube. Nm Hepatobiliary Scan W Ejection Fraction    Result Date: 4/20/2019  EXAMINATION: NUCLEAR MEDICINE HEPATOBILIARY SCINTIGRAPHY (HIDA SCAN). 4/20/2019 2:15 pm TECHNIQUE: Approximately 5.6 [id number] Mebrofenin (Choletec) was administered IV. Then, dynamic images of the abdomen were obtained in the anterior projection for 60 mins. A right lateral view was also obtained at 60 mins. Delayed images up to 4 hours were obtained. COMPARISON: Ultrasound 04/19/2019 HISTORY: ORDERING SYSTEM PROVIDED HISTORY: CHOLECYSTITIS TECHNOLOGIST PROVIDED HISTORY: Ordering Physician Provided Reason for Exam: Cholecystitis Acuity: Unknown Type of Exam: Unknown FINDINGS: Prompt, homogenous uptake by the liver is noted with normal appearance of radiotracer excretion into the biliary system. Clearance of bloodpool activity appears appropriate. Normal small bowel activity is seen. Prominent activity within the common bile duct.   The gallbladder is not visualized by the end of the exam.     Absent filling of the gallbladder consistent with acute cholecystitis. Physical Examination:        Physical Exam   Constitutional: She is oriented to person, place, and time. No distress. Flat effect    HENT:   Mouth/Throat: Oropharynx is clear and moist.   Eyes: Conjunctivae are normal.   Neck: Neck supple. Cardiovascular: Normal rate, regular rhythm and normal heart sounds. Pulmonary/Chest: Effort normal.   Diminished breath sounds in all lung fields    Abdominal: Soft. Bowel sounds are normal.   Musculoskeletal: She exhibits edema (chronic non pitting left leg ). She exhibits no tenderness. Neurological: She is alert and oriented to person, place, and time. Left sided hemiparalaysis   Skin: Skin is warm and dry. Vitals reviewed.         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]     Septic shock (HCC) [A41.9, R65.21]     Leukemoid reaction [D72.823]     Aspiration pneumonia of right lower lobe due to vomit ([address] [J69.0]     MRSA carrier [Z22.322]     Sepsis due to urinary tract infection ([address] [A41.9, N39.0] 04/11/2019    Cystitis [N30.90] 04/11/2019       Plan:          Sepsis secondary to e.coli, emphysematous cystitis  Wbc wnl  ESR and CRP: trending down   Diflucan   Urology consulted - appreciate recs               -manisha dced (4/30)  Gen surg consulted - appreciate recs  ID consulted - appreciate recs                Diflucan until 5/3              Ok to dc from ID standpoint   PICC line placed  5/1  Cholecystotomy tube 4/22   Lasix continued - monitor urine output   F/u fecal occult blood - per nurse, noticed stool looked 'reddish'  Hb: 7.2 - monitor       Pneumonia +MRSA   CXR: continued bilateral effusions, bibasilar opactiies/bilateral interstitial opacites in upper lobes   Cont. abx per ID      Sinus Tachycardia, improved   Stable  Hx of CBA, bradycardia with pacemaker   Lopressor 12.5 BID   Cont. To monitor      Acute cholecystitis   General surgery consulted - appreciate recs   Surgical intervention: cholecystomy tube 4/22   Cholecystectomy in patient?      Gastroparesis   Per GI: protonix  Reglan started on 4/17, daily EKG  Considering systemic autonomic dysfunction as overlying diagnosis (gastroparesis, neurogenic bladder, hypotension/fluctuating Bps)   TPN   GS to advance diet     Oral Candidiasis      Anemia, most likely 2/2 bleeding   Transfused               1 unit 4/14              2 unit 4/16  Restarted lovenox, INR wnl      Thrombocytopenia - improving   Platelets 92 (520)  Cont. lovenox  Cont. To monitor      Hypokalemia   Potassium sliding scale   4.2     Hypophosphatemia   Sodium phosphate prn     Left knee remote distal tibia fx w/ osteopenia   Ortho to remove brace      Maintenance              Lovenox              Dulera, Xopenex, Symbicort, Atrovent              Continue home meds trazodone, ASA, Plavix, Norvasc, Effexor, Spiriva     Dispo  PT/OT eval and treat   Social work Feliciano Barthel, MD  5/2/2019  1:52 PM     Attending Physician Statement    I have discussed the case of Gatito Gutierrez, including pertinent history and exam findings with the resident. I have seen and examined the patient and the key elements of the encounter have been performed by me. I agree with the assessment, plan, and orders as documented by the resident. She had a drop in her hemoglobin which could be secondary to GI bleeding. Was taken off Lovenox and Plavix. She was also noted to have increased interstitial edema and was given a loop diuretic.   Electronically signed by Adam Archibald MD on 5/2/2019 at 1:52 PM Nasalis-Muscle-Based Myocutaneous Island Pedicle Flap Text: Using a #15 blade, an incision was made around the donor flap to the level of the nasalis muscle. Wide lateral undermining was then performed in both the subcutaneous plane above the nasalis muscle, and in a submuscular plane just above periosteum. This allowed the formation of a free nasalis muscle axial pedicle (based on the angular artery) which was still attached to the actual cutaneous flap, increasing its mobility and vascular viability. Hemostasis was obtained with pinpoint electrocoagulation. The flap was mobilized into position and the pivotal anchor points positioned and stabilized with buried interrupted sutures. Subcutaneous and dermal tissues were closed in a multilayered fashion with sutures. Tissue redundancies were excised, and the epidermal edges were apposed without significant tension and sutured with sutures.

## 2022-09-29 NOTE — CARE COORDINATION
DISCHARGE PLANNING NOTE:      LSW is following for patient to return to [facility]. Patient can discharge when ready. No precert required. Rapid response called yesterday. On Bipap. MINESH NEEDS SIGNED AND COMPLETED. Will continue to follow for discharge needs. Advancement Flap (Single) Text: The defect edges were squared with a #15 scalpel blade.  Given the location of the defect and the proximity to free margins a single advancement flap was deemed most appropriate.  Using a sterile surgical marker, an appropriate advancement flap was drawn incorporating the defect and placing the expected incisions within the relaxed skin tension lines where possible.    The area thus outlined was incised deep to adipose tissue with a #15 scalpel blade.  The skin margins were undermined to an appropriate distance in all directions utilizing iris scissors.

## 2023-02-01 NOTE — ED NOTES
Straight cath by Dr. Mccall Eddie unsuccessful. Urology paged.      Jerlyn Soulier, RN  04/11/19 1316 Adbry Pregnancy And Lactation Text: It is unknown if this medication will adversely affect pregnancy or breast feeding.

## 2024-03-14 NOTE — PROGRESS NOTES
0.9 s  Cuff Pressure (cm H2O): 20 cm H2O  Humidification Source: HME  Additional Respiratory  Assessments  Pulse: 91  Resp: 21  SpO2: 99 %  End Tidal CO2: 28 (%)  Position: Semi-Diaz's  Humidification Source: HME  Oral Care: Mouth swabbed, Mouth moisturizer, Mouth suctioned, Suction toothette  Cuff Pressure (cm H2O): 20 cm H2O       ABGs:   Lab Results   Component Value Date    PHART 7.483 04/15/2019    PO2ART 88.6 04/15/2019    VAN6FPG 32.6 04/15/2019       Lab Results   Component Value Date    MODE PRVC 04/15/2019         Medications   IV   propofol 30 mcg/kg/min (04/15/19 0422)    vasopressin (Septic Shock) infusion Stopped (04/14/19 1930)    norepinephrine 11 mcg/min (04/15/19 0419)    lactated ringers 125 mL/hr at 04/15/19 0252    dextrose        polyvinyl alcohol  1 drop Both Eyes Q4H    And    lubrifresh P.M. Both Eyes Q4H    chlorhexidine  15 mL Mouth/Throat BID    ipratropium  0.5 mg Nebulization Q4H    methylPREDNISolone  60 mg Intravenous Q6H    insulin lispro  0-12 Units Subcutaneous Q6H    levalbuterol  1.25 mg Nebulization Q4H    docusate sodium  100 mg Oral TID    vancomycin  750 mg Intravenous Q24H    vancomycin (VANCOCIN) intermittent dosing (placeholder)   Other RX Placeholder    levofloxacin  500 mg Intravenous Q24H    famotidine (PEPCID) injection  20 mg Intravenous BID    venlafaxine  37.5 mg Oral TID    clopidogrel  75 mg Oral Daily    aspirin  81 mg Oral Daily    sodium chloride flush  10 mL Intravenous 2 times per day    enoxaparin  40 mg Subcutaneous Daily       Diet/Nutrition   Dietary Nutrition Supplements: Standard High Calorie Oral Supplement    Exam   VITALS    height is 5' (1.524 m) and weight is 126 lb 5.2 oz (57.3 kg). Her oral temperature is 98.9 °F (37.2 °C). Her blood pressure is 93/50 (abnormal) and her pulse is 91. Her respiration is 21 and oxygen saturation is 99%.    Ventilator Settings (Basic)  Vent Mode: PRVC Rate Set: 16 bmp/Vt Ordered: 450 mL/ /FiO2 : 50 %    Constitutional - Sedated  General Appearance  well developed, well nourished  HEENT - Life support devices in place (ET,),normocephalic, atraumatic. Lungs - Chest expands equally, no wheezes, rales or rhonchi. Cardiovascular - Heart sounds are normal.  normal rate and rhythm regular, no murmur, gallop or rub. Abdomen - soft, nontender, nondistended, no masses   Extremities - no cyanosis, clubbing     Lab Results   CBC     Lab Results   Component Value Date    WBC 27.5 04/15/2019    RBC 3.01 04/15/2019    RBC 3.66 05/23/2012    HGB 8.8 04/15/2019    HCT 25.8 04/15/2019     04/15/2019     05/23/2012    MCV 85.7 04/15/2019    MCH 29.3 04/15/2019    MCHC 34.2 04/15/2019    RDW 13.7 04/15/2019    METASPCT 2 04/11/2019    LYMPHOPCT 4 04/11/2019    MONOPCT 2 04/11/2019    BASOPCT 0 04/11/2019    MONOSABS 0.96 04/11/2019    LYMPHSABS 1.92 04/11/2019    EOSABS 0.00 04/11/2019    BASOSABS 0.00 04/11/2019    DIFFTYPE NOT REPORTED 04/11/2019       BMP   Lab Results   Component Value Date     04/15/2019    K 3.1 04/15/2019     04/15/2019    CO2 23 04/15/2019    BUN 6 04/15/2019    CREATININE <0.40 04/15/2019    GLUCOSE 134 04/15/2019    GLUCOSE 87 05/21/2012    CALCIUM 7.1 04/15/2019       LFTS  Lab Results   Component Value Date    ALKPHOS 126 04/15/2019    ALT 20 04/15/2019    AST 48 04/15/2019    PROT 4.2 04/15/2019    BILITOT 0.74 04/15/2019    BILIDIR 0.21 05/17/2012    IBILI 0.36 05/17/2012    LABALBU 1.7 04/15/2019    LABALBU 4.6 05/17/2012       INR  Recent Labs     04/13/19  0314 04/14/19  0439 04/15/19  0422   PROTIME 23.7* 22.7* 14.8*   INR 2.1 2.0 1.2       APTT  No results for input(s): APTT in the last 72 hours. Lactic Acid  Lab Results   Component Value Date    LACTA 2.1 04/14/2019    LACTA 1.5 04/12/2019    LACTA 1.9 04/12/2019        BNP   No results for input(s): BNP in the last 72 hours. Cultures       Radiology     Plain Films       Chest x-ray reviewed. The congestive changes improved    See actual reports for details    SYSTEM ASSESSMENT  Acute hypoxic respiratory failure  FiO2 now down to 50% with PEEP of 10  UTI E coli  not ESBL  Septic shock     Neuro       Respiratory   Wean oxygen as tolerated. Keep O2 sat 90-92%    Hemodynamics       Gastrointestinal/Nutrition       Renal       Infectious Disease       Hematology/Oncology       Endocrine       Social/Spiritual/DNR/Disposition/Other   Wean FiO2 hopefully can wean PEEP soon  On Lovenox for DVT prophylaxis  On vancomycin and Levaquin.   Decrease Solu-Medrol from 60-40 mg every 6 hours  Wean Levophed as tolerated    Critical Care Time   35 min    Electronically signed by Hailey Her MD on 4/15/2019 at 10:28 AM Skin normal color for race, warm, dry and intact. No evidence of rash.

## 2024-07-31 NOTE — PROGRESS NOTES
Occupational Therapy  Facility/Department: [address] BURN UNIT  Daily Treatment Note  NAME: Ofelia Marcus  : 1950  MRN: [id number]    Date of Service: 3/29/2019    Discharge Recommendations:    Further therapy recommended at discharge. Assessment   Performance deficits / Impairments: Decreased functional mobility ; Decreased strength;Decreased endurance;Decreased ADL status; Decreased high-level IADLs  Treatment Diagnosis: L knee pain   Prognosis: Good  Patient Education: pt ed on POC, purpose of eval, importance of continued movement to avoid bed sores and stiffness, benefits of therapy, safe eating tech. poor return   REQUIRES OT FOLLOW UP: Yes  Activity Tolerance  Activity Tolerance: Patient limited by pain  Safety Devices  Safety Devices in place: Yes  Type of devices: Call light within reach; Left in bed;Bed alarm in place         Patient Diagnosis(es): The encounter diagnosis was Acute pain of left knee. has a past medical history of Abnormal computed tomography of cervical spine, CVA (cerebral vascular accident) ([facility] [address] Paraproteinemia, and Weight loss. has a past surgical history that includes pacemaker placement (2011). Restrictions  Restrictions/Precautions  Restrictions/Precautions: Fall Risk  Required Braces or Orthoses?: No  Position Activity Restriction  Other position/activity restrictions: up with assist, L hemiplegia from prior CVA, L knee x-ray negative  Subjective   General  Chart Reviewed: No  Patient assessed for rehabilitation services?: Yes  Family / Caregiver Present: No  Diagnosis: L knee pain   Subjective  Subjective:   General Comment  Comments: RN ok'd for therapy this afternoon.  Pt agreeable to participate in session, but minimally engaged throughout   Pain Assessment  Pain Assessment: 0-10  Pain Level: 7  Pain Type: Acute pain  Pain Location: Knee  Pain Orientation: Left  Response to Pain Intervention: Patient Satisfied  Vital Signs  Patient Currently in Pain: Yes Your child has an infection of Left ear. We will treat with antibiotics as prescribed and comfort measures such as ibuprofen and acetaminophen.  Please call if there is no improvement or worsening of symptoms in 3-5 days or new concerns arise.   Ear recheck in 2-3 weeks if ear leakage is noted.   Orientation     Objective    ADL  Feeding: Minimal assistance(Writer assist with opening can of soda, pt able to bring to mouth. Writer educated pt on proper body mechanics while drinking to avoid choking.)  Grooming: Stand by assistance;Setup(Facial hygiene)  UE Bathing: Moderate assistance;Setup(Writer assist with RUE d/t LUE weakness secondary to previous CVA. Writer also assisted pt with holding LUE in air in order for pt to wash underarm.)  UE Dressing: Setup;Minimal assistance(to manage gown)  Toileting: Maximum assistance(Pt wearing brief at this time)  Additional Comments: Pt completed UE bathing supine in bed, HOB elevated. See above for LOF. Bed mobility  Comment: Pt inititally agreeable to sit EOB, however when writer placed hand on pillow to remove from under LLUE pt yelled out in pain and refused any movement d/t increase in LLE pain. Pt very resistive to any movement of LLE. Plan   Plan  Times per week: 4x/wk    Goals  Short term goals  Time Frame for Short term goals: pt will, by discharge  Short term goal 1: dem mod A during bed mobility in order to increase independence   Short term goal 2: dem understanding of 1-2 non-medication pain   Short term goal 3: complete UB ADLs with min A, set up and modified tech  Short term goal 4: complete LB ADLs with mod A, set up and AE, as needed  Short term goal 5: complete supine>sit transfer with mod A in order to increase ability to complete functional transfers        Therapy Time   Individual Concurrent Group Co-treatment   Time In 1329         Time Out 1354         Minutes 25             RN OK'ed tx and pt agreeable. Supine in bed upon writer arrival. See above for LOF for all tasks. Retired supine in bed, HOB elevated, call light within reach and RN notified.      VIRA Mcarthur

## 2024-09-03 NOTE — PROGRESS NOTES
Addendum  created 09/03/24 1317 by Dylan Zuniga CRNA    Flowsheet accepted       ICU Progress Note (Non-Vent)  [facility]    Patient - Juliet Avalos,  Age - 77 y.o.    - 1950      Room Number -    N -  [id number]   [facility] # - [[phone]]  Date of Admission -  2019  2:48 PM    Follow-up: urosepsis, septic shock, acute respiratory failure     Events of Past 24 Hours   No acute overnight events. Extubated . O2 saturation 92 on 2L NC. Levophed drip at 4mcg/min overnight; down to 2mcg/min this morning. On amiodarone drip 0.5mg/min. Mild abdominal pain at site of cholecystostomy tube; 100ml bilious output overnight. Complains of mild wheezing; breathing treatments help. Some nausea with clear liquid diet; continuing TPN at this time. Denies chills, sweats, cough, chest pain, or diarrhea. Feels improved from yesterday. Vitals    height is 5' (1.524 m) and weight is 131 lb 13.4 oz (59.8 kg). Her axillary temperature is 98.5 °F (36.9 °C). Her blood pressure is 87/48 (abnormal) and her pulse is 88. Her respiration is 24 and oxygen saturation is 92%.        Temperature Range: Temp: 98.5 °F (36.9 °C) Temp  Av.3 °F (36.8 °C)  Min: 97.6 °F (36.4 °C)  Max: 98.6 °F (37 °C)  BP Range:  Systolic (54NED), WBM:833 , Min:87 , MFB:738     Diastolic (60WSB), JOT:69, Min:41, Max:80    Pulse Range: Pulse  Av.8  Min: 84  Max: 125  Respiration Range: Resp  Av.5  Min: 16  Max: 30  Current Pulse Ox[de-identified]  SpO2: 92 %  24HR Pulse Ox Range:  SpO2  Av.4 %  Min: 91 %  Max: 98 %  Oxygen Amount and Delivery: O2 Flow Rate (L/min): 2 L/min    Wt Readings from Last 3 Encounters:   19 131 lb 13.4 oz (59.8 kg)   19 89 lb (40.4 kg)   19 94 lb 1.6 oz (42.7 kg)     I/O       Intake/Output Summary (Last 24 hours) at 2019 1030  Last data filed at 2019 0530  Gross per 24 hour   Intake 1376 ml   Output 2935 ml   Net -1559 ml     DRAIN/TUBE OUTPUT place. Heart sounds are normal.  normal rate and rhythm regular, no murmur, gallop or rub. Abdomen - cholecystectomy tube in place. soft, tender at the incision, nondistended  Neurologic - hx of CVA with chronic left sided motor deficits. Skin - no bruising or bleeding  Extremities - no cyanosis, clubbing , bilateral edema    Lab Results   CBC     Lab Results   Component Value Date    WBC 17.9 04/23/2019    RBC 3.68 04/23/2019    RBC 3.66 05/23/2012    HGB 10.9 04/23/2019    HCT 33.6 04/23/2019     04/23/2019     05/23/2012    MCV 91.3 04/23/2019    MCH 29.6 04/23/2019    MCHC 32.4 04/23/2019    RDW 15.2 04/23/2019    METASPCT 2 04/11/2019    LYMPHOPCT 4 04/11/2019    MONOPCT 2 04/11/2019    BASOPCT 0 04/11/2019    MONOSABS 0.96 04/11/2019    LYMPHSABS 1.92 04/11/2019    EOSABS 0.00 04/11/2019    BASOSABS 0.00 04/11/2019    DIFFTYPE NOT REPORTED 04/11/2019       BMP   Lab Results   Component Value Date     04/23/2019    K 4.1 04/23/2019    CL 98 04/23/2019    CO2 27 04/23/2019    BUN 27 04/23/2019    CREATININE <0.40 04/23/2019    GLUCOSE 131 04/23/2019    GLUCOSE 87 05/21/2012       LFTS  Lab Results   Component Value Date    ALKPHOS 87 04/23/2019    ALT 24 04/23/2019    AST 27 04/23/2019    PROT 4.2 04/23/2019    BILITOT 0.30 04/23/2019    BILIDIR 0.20 04/18/2019    IBILI 0.26 04/18/2019    LABALBU 1.8 04/23/2019    LABALBU 4.6 05/17/2012       ABG ABGs:   Lab Results   Component Value Date    PHART 7.519 04/19/2019    PO2ART 73.3 04/19/2019    UFD2UBC 42.5 04/19/2019       Lab Results   Component Value Date    MODE PRVC 04/19/2019         INR  Recent Labs     04/21/19  0418 04/22/19  0502 04/23/19  0400   PROTIME 14.5 15.1* 16.5*   INR 1.1 1.2 1.3       APTT  No results for input(s): APTT in the last 72 hours.     Lactic Acid  Lab Results   Component Value Date    LACTA 2.1 04/14/2019    LACTA 1.5 04/12/2019    LACTA 1.9 04/12/2019        BNP   No results for input(s): BNP in the last 72 hours.     Cultures       Radiology         SYSTEMS ASSESSMENT    Neuro   Alert and oriented. Off sedation. Hx of CVA with chronic left hemiparesis     Respiratory   Acute hypoxic respiratory failure  - extubated 4/19  - saturation 92% on 2L NC. Wean oxygen as tolerated. Keep O2 sat > 88%    Bilateral pleural effusions (L > R)  - consider diuresis once blood pressure stabilizes  Fluid balance -1559 mL    Hx COPD with wheezing  - continue xopenex and Atrovent   Solu-Medrol at 20 mg every     Cardiovascular   Septic shock/hypotenision  - on levophed. Titrate to MAP > 65. Wean as tolerated. On amiodarone drip 0.5mg/min. 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  - IR placed cholecystostomy on 4/22/19. Procedure uncomplicated. - 100ml bilious output overnight  - fluid analysis negative at this time. Cultures pending.   - on Levaquin and Flagyl     Started on clear liquid diet. Will continue TPN at this time. Diarrhea yesterday; resolved. Renal   BUN 27. Creatinine < 0.40    Infectious Disease   Urosepsis  - + for E. Coli  - WBC 17.9 from 21.2 yesterday  - procalcitonin 0.14 from 0.12    Since admission  + MRSA nasal swab culture  + budding yeast from sputum    Hematology/Oncology   Anemia  - Hgb 10.9 down from 11.7. Will monitor. Thrombocytopenia  - platelets trending downward  - ASA and Plavix restarted today    DVT prophylaxis  - on lovenox     Endocrine       Social/Spiritual/DNR/Disposition/Other       Electronically signed by Kristin Perez on 4/23/2019 at 10:30 AM    Patient seen and examined independently by me. Above discussed and I agree with medical student note except where indicated in the EMR revision history. Also see my additional comments and changes indicated by discrete font, text color, italics, and/or initials. Labs, cultures, and radiographs where available were reviewed. Check chest x-ray in a.m.     Electronically signed by Harsha Meade MD on 4/23/2019 at 11:45 AM

## 2025-06-27 NOTE — PLAN OF CARE
Description: Ability to maintain normal respiratory secretions will improve  12/13/2020 1944 by Tricia Ramírez RN  Outcome: Ongoing  Pt maintained on bipap throughout most of shift. Pt decompensated and intubated at end of shift. Pulmonary following. Equipment checks per RT. Problem: HEMODYNAMIC STATUS  Goal: Patient has stable vital signs and fluid balance  12/13/2020 1944 by Tricia Ramírez RN  Outcome: Ongoing  Monitoring intake, output, labs, and vital signs. Nephrology following. Problem: ACTIVITY INTOLERANCE/IMPAIRED MOBILITY  Goal: Mobility/activity is maintained at optimum level for patient  12/13/2020 1944 by Tricia Ramírez RN  Outcome: Ongoing     Problem: COMMUNICATION IMPAIRMENT  Goal: Ability to express needs and understand communication  12/13/2020 1944 by Tricia Ramírez RN  Outcome: Ongoing     Problem: Nutrition  Goal: Optimal nutrition therapy  Description: Nutrition Problem #1: Inadequate oral intake  Intervention: Food and/or Nutrient Delivery: Continue Current Diet, Start Oral Nutrition Supplement  Nutritional Goals: po intake greater than 50%  12/13/2020 1944 by Tricia Ramírez RN  Outcome: Ongoing     Problem: Musculor/Skeletal Functional Status  Goal: Highest potential functional level  12/13/2020 1944 by Tricia Ramírez RN  Outcome: Ongoing  Goal: Absence of falls  12/13/2020 1944 by Tricia Ramírez RN  Outcome: Ongoing     Problem: Restraint Use - Nonviolent/Non-Self-Destructive Behavior:  Goal: Absence of restraint-related injury  Description: Absence of restraint-related injury  Outcome: Ongoing  No restraint related injuries noted this shift. Restraints removed q2hrs for ROM, skin assessment, and skin maintenance.       Problem: Restraint Use - Nonviolent/Non-Self-Destructive Behavior:  Goal: Absence of restraint indications  Description: Absence of restraint indications  Outcome: Not Met This Shift Pt continues to reach for lines/tubes when restraints are released. Restraints reapplied. Removal criteria explained. Reassurance given. Enoxaparin/Lovenox is used to treat and prevent blood clots. Use a different body area each time you give yourself a shot. Keep track of where you give each shot to make sure you rotate body areas. Use a new needle and syringe each time you inject your medicine. Never skip a dose of Enoxaparin/Lovenox. You must use this medicine on a fixed schedule.  NEVER TAKE A DOUBLE DOSE. Call your doctor or pharmacist if you miss a dose. Take Enoxaparin/Lovenox at the same time each morning and/or evening.

## (undated) DEVICE — GAUZE,PACKING STRIP,IODOFORM,1/2"X5YD,ST: Brand: CURAD

## (undated) DEVICE — Z DISCONTINUED BY MEDLINE USE 2711682 TRAY SKIN PREP DRY W/ PREM GLV

## (undated) DEVICE — SUTURE PERMAHAND SZ 3-0 L18IN NONABSORBABLE BLK L26MM SH C013D

## (undated) DEVICE — STRAP,POSITIONING,KNEE/BODY,FOAM,4X60": Brand: MEDLINE

## (undated) DEVICE — SUTURE VCRL + SZ 4-0 L18IN ABSRB UD L19MM PS-2 3/8 CIR PRIM VCP496H

## (undated) DEVICE — 3M™ WARMING BLANKET, UPPER BODY, 10 PER CASE, 42268: Brand: BAIR HUGGER™

## (undated) DEVICE — CANNULA NSL AD TBNG L7FT PVC STR NONFLARED PRNG O2 DEL W STD

## (undated) DEVICE — TUBE FEED GASTROSTOMY MIC 20FR

## (undated) DEVICE — POOLE SUCTION HANDLE: Brand: CARDINAL HEALTH

## (undated) DEVICE — YANKAUER,OPEN TIP,W/O VENT,STERILE: Brand: MEDLINE INDUSTRIES, INC.

## (undated) DEVICE — Device

## (undated) DEVICE — STAPLER INT L55MM CUT LN L53MM STPL LN L57MM BLU B FRM 8

## (undated) DEVICE — YANKAUER,BULB TIP,W/O VENT,RIGID,STERILE: Brand: MEDLINE

## (undated) DEVICE — SOLUTION ANTIFOG VIS SYS CLEARIFY LAPSCP

## (undated) DEVICE — TOTAL TRAY, DB, 100% SILI FOLEY, 16FR 10: Brand: MEDLINE

## (undated) DEVICE — STAPLER SKIN STPL LN H1.5-3.5XL30MM REG TISS 2 ROW 8 FIRING

## (undated) DEVICE — JELLY,LUBE,STERILE,FLIP TOP,TUBE,2-OZ: Brand: MEDLINE

## (undated) DEVICE — SOLUTION IV IRRIG POUR BRL 0.9% SODIUM CHL 2F7124

## (undated) DEVICE — TUBE ET DIA7.5MM ORAL NSL CUF MURPHY EYE HI LO RADPQ LN

## (undated) DEVICE — GLOVE ORANGE PI 7 1/2   MSG9075

## (undated) DEVICE — TROCAR ENDOSCP L100MM DIA5MM BLDELSS STBL SL OBT RADLUC

## (undated) DEVICE — SHEET, T, LAPAROTOMY, STERILE: Brand: MEDLINE

## (undated) DEVICE — DEFENDO AIR WATER SUCTION AND BIOPSY VALVE KIT FOR  OLYMPUS: Brand: DEFENDO AIR/WATER/SUCTION AND BIOPSY VALVE

## (undated) DEVICE — SUTURE ETHBND EXCEL SZ 0 L18IN NONABSORBABLE GRN L36MM CT-1 CX21D

## (undated) DEVICE — SYRINGE IRRIG 60ML SFT PLIABLE BLB EZ TO GRP 1 HND USE W/

## (undated) DEVICE — SUTURE VCRL + SZ 0 L27IN ABSRB VLT L26MM UR-6 5/8 CIR VCP603H

## (undated) DEVICE — GAUZE,SPONGE,FLUFF,6"X6.75",STRL,5/TRAY: Brand: MEDLINE

## (undated) DEVICE — DRAINBAG,ANTI-REFLUX TOWER,L/F,2000ML,LL: Brand: MEDLINE

## (undated) DEVICE — SINGLE PORT MANIFOLD: Brand: NEPTUNE 2

## (undated) DEVICE — BARRIER, ABSORBABLE, ADHESION: Brand: SEPRAFILM®

## (undated) DEVICE — SPONGE LAP W18XL18IN WHT COT 4 PLY FLD STRUNG RADPQ DISP ST

## (undated) DEVICE — STANDARD HYPODERMIC NEEDLE,POLYPROPYLENE HUB: Brand: MONOJECT

## (undated) DEVICE — GOWN,POLY REINFORCED,LG: Brand: MEDLINE

## (undated) DEVICE — MIC* SAFETY PERCUTANEOUS ENDOSCOPIC GASTROSTOMY PEG KIT - 20 FR - PULL: Brand: MIC PEG TUBE

## (undated) DEVICE — COVER,MAYO STAND,STERILE: Brand: MEDLINE

## (undated) DEVICE — Z INACTIVE USE 2525529 CONNECTOR TBNG FOR O2

## (undated) DEVICE — BITEBLOCK 54FR W/ DENT RIM BLOX

## (undated) DEVICE — SYRINGE MED 50ML LUERSLIP TIP

## (undated) DEVICE — STAPLER INT L60MM REG TISS BLU B FRM 8 FIRING 2 ROW AUTO

## (undated) DEVICE — TROCARS: Brand: KII® BALLOON BLUNT TIP SYSTEM

## (undated) DEVICE — DRAIN,WOUND,15FR,3/16,FULL-FLUTED: Brand: MEDLINE

## (undated) DEVICE — DRAIN SURG 19FR 100% SIL RADPQ RND CHN FULL FLUT

## (undated) DEVICE — PAD,NON-ADHERENT,3X8,STERILE,LF,1/PK: Brand: MEDLINE

## (undated) DEVICE — ST CHARLES MINOR ABDOMINAL PK: Brand: MEDLINE INDUSTRIES, INC.

## (undated) DEVICE — STAPLER INT L75MM CUT LN L73MM STPL LN L77MM BLU B FRM 8

## (undated) DEVICE — SUTURE PDS II SZ 0 L60IN ABSRB VLT L48MM CTX 1/2 CIR Z990G

## (undated) DEVICE — ST CHARLES MAJOR ABDOMINAL PK: Brand: MEDLINE INDUSTRIES, INC.

## (undated) DEVICE — Device: Brand: LEVEL 1

## (undated) DEVICE — GLOVE ORANGE PI 7   MSG9070

## (undated) DEVICE — SNARE ENDOSCP L240CM LOOP W13MM DIA2.4MM SHT THROW SM OVL

## (undated) DEVICE — SUTURE VCRL SZ 0 L36IN ABSRB UD CT-1 L36MM 1/2 CIR TAPR PNT VCP946H

## (undated) DEVICE — ST CHARLES GEN LAPAROSCOPY PK: Brand: MEDLINE INDUSTRIES, INC.

## (undated) DEVICE — LIGASURE REPROCESS IMPACT

## (undated) DEVICE — COVER,TABLE,60X90,STERILE: Brand: MEDLINE

## (undated) DEVICE — TROCAR ENDOSCP L100MM DIA5MM BLDELSS STBL SL THRD OPT VW

## (undated) DEVICE — SOLUTION IV IRRIG WATER 1000ML POUR BRL 2F7114

## (undated) DEVICE — DRAPE IRRIG FLD WRM W44XL44IN W/ AORN STD PRTBL INTRATEMP

## (undated) DEVICE — GOWN,AURORA,NONREINFORCED,LARGE: Brand: MEDLINE

## (undated) DEVICE — BINDER ABD SM M W9IN FOR 30 45IN 3 PNL E CNTCT CLSR POSTOP

## (undated) DEVICE — SUTURE ETHBND EXCEL SZ 2 L20IN NONABSORBABLE GRN L75MM LR X406T

## (undated) DEVICE — SPONGE GZ W4XL4IN RAYON POLY FILL CVR W/ NONWOVEN FAB

## (undated) DEVICE — PAD,ABDOMINAL,8"X7.5",ST,LF,20/BX: Brand: MEDLINE INDUSTRIES, INC.

## (undated) DEVICE — SUTURE VCRL + SZ 4-0 L27IN ABSRB WHT FS-2 3/8 CIR REV CUT VCP422H

## (undated) DEVICE — RESERVOIR,SUCTION,100CC,SILICONE: Brand: MEDLINE

## (undated) DEVICE — BLADE ES L6IN ELASTOMERIC COAT EXT DURABLE BEND UPTO 90DEG

## (undated) DEVICE — SYRINGE, LUER LOCK, 10ML: Brand: MEDLINE

## (undated) DEVICE — SUTURE PERMAHAND SZ 0 L30IN NONABSORBABLE BLK FSL L30MM 3/8 680H

## (undated) DEVICE — DRESSING TRNSPAR W4XL10IN FLM MIC POR SURESITE 123

## (undated) DEVICE — RELOAD STPL L75MM OPN H3.8MM CLS 1.5MM WIRE DIA0.2MM REG

## (undated) DEVICE — SUTURE VCRL + SZ 0 L27IN ABSRB UD L36MM CT-1 1/2 CIR VCPP41D

## (undated) DEVICE — SPONGE DRN W4XL4IN RAYON/POLYESTER 6 PLY NONWOVEN PRECUT